# Patient Record
Sex: FEMALE | Race: WHITE | NOT HISPANIC OR LATINO | Employment: OTHER | URBAN - METROPOLITAN AREA
[De-identification: names, ages, dates, MRNs, and addresses within clinical notes are randomized per-mention and may not be internally consistent; named-entity substitution may affect disease eponyms.]

---

## 2017-07-06 ENCOUNTER — HOSPITAL ENCOUNTER (OUTPATIENT)
Dept: LAB | Facility: HOSPITAL | Age: 72
Discharge: HOME | End: 2017-07-06
Attending: TRANSPLANT SURGERY | Admitting: TRANSPLANT SURGERY

## 2017-07-06 LAB
ALBUMIN SERPL-MCNC: 3.6 G/DL (ref 3.5–4.8)
ALBUMIN/GLOB SERPL: 1.6 {RATIO} (ref 1–1.7)
ALP SERPL-CCNC: 62 IU/L (ref 32–91)
ALT SERPL-CCNC: 36 IU/L (ref 14–54)
ANION GAP SERPL CALC-SCNC: 13.9 MMOL/L (ref 10–20)
AST SERPL-CCNC: 29 IU/L (ref 15–41)
BILIRUB SERPL-MCNC: 1 MG/DL (ref 0.3–1.2)
BUN SERPL-MCNC: 19 MG/DL (ref 8–20)
BUN/CREAT SERPL: 15.8 (ref 5.4–26.2)
CALCIUM SERPL-MCNC: 9.8 MG/DL (ref 8.9–10.3)
CHLORIDE SERPL-SCNC: 102 MMOL/L (ref 101–111)
CHOLEST SERPL-MCNC: 221 MG/DL
CONV ABS BANDS: 0.1 10*3/UL
CONV CO2: 27 MMOL/L (ref 22–32)
CONV POLYCHROMASIA IN BLOOD BY LIGHT MICROSCOPY: SLIGHT
CONV TOTAL PROTEIN: 5.9 G/DL (ref 6.1–7.9)
CREAT UR-MCNC: 1.2 MG/DL (ref 0.4–1)
DIFFERENTIAL METHOD BLD: (no result)
ERYTHROCYTE [DISTWIDTH] IN BLOOD BY AUTOMATED COUNT: 14.5 % (ref 11.5–14.5)
GLOBULIN UR ELPH-MCNC: 2.3 G/DL (ref 2.5–3.8)
GLUCOSE SERPL-MCNC: 97 MG/DL (ref 65–99)
HCT VFR BLD AUTO: 38.9 % (ref 35–49)
HGB BLD-MCNC: 12.9 G/DL (ref 12–15)
MAGNESIUM SERPL-MCNC: 1.8 MG/DL (ref 1.8–2.5)
MCH RBC QN AUTO: 32.6 PG (ref 26–32)
MCHC RBC AUTO-ENTMCNC: 33.1 G/DL (ref 32–36)
MCV RBC AUTO: 98.4 FL (ref 80–94)
MONOCYTES # BLD AUTO: 0.1 10*3/UL (ref 0.1–1.3)
MONOCYTES NFR BLD AUTO: 2 % (ref 2–11)
NEUTROPHILS # BLD AUTO: 6.1 10*3/UL (ref 2.3–8.6)
NEUTROPHILS NFR BLD AUTO: 96 % (ref 50–75)
NEUTS BAND NFR BLD MANUAL: 2 % (ref 0–5)
PHOSPHATE SERPL-MCNC: 1.5 MG/DL (ref 2.4–4.7)
PLATELET # BLD AUTO: 130 10*3/UL (ref 150–450)
PMV BLD AUTO: 8.6 FL (ref 7.4–10.4)
POTASSIUM SERPL-SCNC: 3.9 MMOL/L (ref 3.6–5.1)
RBC # BLD AUTO: 3.95 10*6/UL (ref 4–5.4)
SODIUM SERPL-SCNC: 139 MMOL/L (ref 136–144)
TRIGL SERPL-MCNC: 453 MG/DL
WBC # BLD AUTO: 6.3 10*3/UL (ref 4.5–11.5)

## 2017-07-10 ENCOUNTER — HOSPITAL ENCOUNTER (OUTPATIENT)
Dept: LAB | Facility: HOSPITAL | Age: 72
Setting detail: SPECIMEN
Discharge: HOME | End: 2017-07-10
Attending: TRANSPLANT SURGERY | Admitting: TRANSPLANT SURGERY

## 2017-07-10 LAB
ALBUMIN SERPL-MCNC: 3.8 G/DL (ref 3.5–4.8)
ALBUMIN/GLOB SERPL: 1.7 {RATIO} (ref 1–1.7)
ALP SERPL-CCNC: 70 IU/L (ref 32–91)
ALT SERPL-CCNC: 26 IU/L (ref 14–54)
ANION GAP SERPL CALC-SCNC: 11.9 MMOL/L (ref 10–20)
AST SERPL-CCNC: 21 IU/L (ref 15–41)
BILIRUB SERPL-MCNC: 1.2 MG/DL (ref 0.3–1.2)
BUN SERPL-MCNC: 13 MG/DL (ref 8–20)
BUN/CREAT SERPL: 11.8 (ref 5.4–26.2)
CALCIUM SERPL-MCNC: 9.8 MG/DL (ref 8.9–10.3)
CHLORIDE SERPL-SCNC: 107 MMOL/L (ref 101–111)
CHOLEST SERPL-MCNC: 224 MG/DL
CONV ABS BANDS: 0.2 10*3/UL
CONV ABS IMM GRAN: 0.07 10*3/UL
CONV CO2: 26 MMOL/L (ref 22–32)
CONV POLYCHROMASIA IN BLOOD BY LIGHT MICROSCOPY: SLIGHT
CONV TOTAL PROTEIN: 6.1 G/DL (ref 6.1–7.9)
CONV TOXIC GRANULES IN BLOOD BY LIGHT MICROSCOPY: SLIGHT
CREAT UR-MCNC: 1.1 MG/DL (ref 0.4–1)
DIFFERENTIAL METHOD BLD: (no result)
ERYTHROCYTE [DISTWIDTH] IN BLOOD BY AUTOMATED COUNT: 15.1 % (ref 11.5–14.5)
GLOBULIN UR ELPH-MCNC: 2.3 G/DL (ref 2.5–3.8)
GLUCOSE SERPL-MCNC: 110 MG/DL (ref 65–99)
HCT VFR BLD AUTO: 40.4 % (ref 35–49)
HGB BLD-MCNC: 13.2 G/DL (ref 12–15)
LYMPHOCYTES # BLD AUTO: 0.1 10*3/UL (ref 0.8–4.8)
LYMPHOCYTES NFR BLD AUTO: 1 % (ref 18–42)
MAGNESIUM SERPL-MCNC: 1.7 MG/DL (ref 1.8–2.5)
MCH RBC QN AUTO: 32.3 PG (ref 26–32)
MCHC RBC AUTO-ENTMCNC: 32.7 G/DL (ref 32–36)
MCV RBC AUTO: 98.8 FL (ref 80–94)
METAMYELOCYTES NFR BLD: 1 %
MONOCYTES # BLD AUTO: 0.1 10*3/UL (ref 0.1–1.3)
MONOCYTES NFR BLD AUTO: 2 % (ref 2–11)
NEUTROPHILS # BLD AUTO: 6.9 10*3/UL (ref 2.3–8.6)
NEUTROPHILS NFR BLD AUTO: 93 % (ref 50–75)
NEUTS BAND NFR BLD MANUAL: 3 % (ref 0–5)
PHOSPHATE SERPL-MCNC: 1.3 MG/DL (ref 2.4–4.7)
PLATELET # BLD AUTO: (no result) 10*3/UL (ref 150–450)
PMV BLD AUTO: 8 FL (ref 7.4–10.4)
POTASSIUM SERPL-SCNC: 3.9 MMOL/L (ref 3.6–5.1)
RBC # BLD AUTO: 4.09 10*6/UL (ref 4–5.4)
SODIUM SERPL-SCNC: 141 MMOL/L (ref 136–144)
TRIGL SERPL-MCNC: 317 MG/DL
WBC # BLD AUTO: 7.4 10*3/UL (ref 4.5–11.5)

## 2017-07-13 ENCOUNTER — HOSPITAL ENCOUNTER (OUTPATIENT)
Dept: LAB | Facility: HOSPITAL | Age: 72
Setting detail: SPECIMEN
Discharge: HOME | End: 2017-07-13
Attending: TRANSPLANT SURGERY | Admitting: TRANSPLANT SURGERY

## 2017-07-13 LAB
ALBUMIN SERPL-MCNC: 3.6 G/DL (ref 3.5–4.8)
ALBUMIN/GLOB SERPL: 1.9 {RATIO} (ref 1–1.7)
ALP SERPL-CCNC: 79 IU/L (ref 32–91)
ALT SERPL-CCNC: 19 IU/L (ref 14–54)
ANION GAP SERPL CALC-SCNC: 12.7 MMOL/L (ref 10–20)
AST SERPL-CCNC: 17 IU/L (ref 15–41)
BASOPHILS # BLD AUTO: 0.1 10*3/UL (ref 0–0.2)
BASOPHILS NFR BLD AUTO: 1 % (ref 0–2)
BILIRUB SERPL-MCNC: 1 MG/DL (ref 0.3–1.2)
BUN SERPL-MCNC: 12 MG/DL (ref 8–20)
BUN/CREAT SERPL: 12 (ref 5.4–26.2)
CALCIUM SERPL-MCNC: 9.2 MG/DL (ref 8.9–10.3)
CHLORIDE SERPL-SCNC: 111 MMOL/L (ref 101–111)
CONV CO2: 23 MMOL/L (ref 22–32)
CONV TOTAL PROTEIN: 5.5 G/DL (ref 6.1–7.9)
CREAT UR-MCNC: 1 MG/DL (ref 0.4–1)
DIFFERENTIAL METHOD BLD: (no result)
EOSINOPHIL # BLD AUTO: 0 % (ref 0–3)
EOSINOPHIL # BLD AUTO: 0 10*3/UL (ref 0–0.3)
ERYTHROCYTE [DISTWIDTH] IN BLOOD BY AUTOMATED COUNT: 15 % (ref 11.5–14.5)
GLOBULIN UR ELPH-MCNC: 1.9 G/DL (ref 2.5–3.8)
GLUCOSE SERPL-MCNC: 104 MG/DL (ref 65–99)
HCT VFR BLD AUTO: 38.3 % (ref 35–49)
HGB BLD-MCNC: 12.7 G/DL (ref 12–15)
LYMPHOCYTES # BLD AUTO: 0 10*3/UL (ref 0.8–4.8)
LYMPHOCYTES NFR BLD AUTO: 1 % (ref 18–42)
MAGNESIUM SERPL-MCNC: 1.2 MG/DL (ref 1.8–2.5)
MCH RBC QN AUTO: 32.6 PG (ref 26–32)
MCHC RBC AUTO-ENTMCNC: 33.2 G/DL (ref 32–36)
MCV RBC AUTO: 98.3 FL (ref 80–94)
MONOCYTES # BLD AUTO: 0.2 10*3/UL (ref 0.1–1.3)
MONOCYTES NFR BLD AUTO: 4 % (ref 2–11)
NEUTROPHILS # BLD AUTO: 5.3 10*3/UL (ref 2.3–8.6)
NEUTROPHILS NFR BLD AUTO: 94 % (ref 50–75)
NRBC BLD AUTO-RTO: 0 /100{WBCS}
NRBC/RBC NFR BLD MANUAL: 0 10*3/UL
PHOSPHATE SERPL-MCNC: 2.5 MG/DL (ref 2.4–4.7)
PLATELET # BLD AUTO: 167 10*3/UL (ref 150–450)
PMV BLD AUTO: 7.5 FL (ref 7.4–10.4)
POTASSIUM SERPL-SCNC: 3.7 MMOL/L (ref 3.6–5.1)
RBC # BLD AUTO: 3.89 10*6/UL (ref 4–5.4)
SODIUM SERPL-SCNC: 143 MMOL/L (ref 136–144)
WBC # BLD AUTO: 5.5 10*3/UL (ref 4.5–11.5)

## 2017-07-17 ENCOUNTER — HOSPITAL ENCOUNTER (OUTPATIENT)
Dept: LAB | Facility: HOSPITAL | Age: 72
Setting detail: SPECIMEN
Discharge: HOME | End: 2017-07-17
Attending: TRANSPLANT SURGERY | Admitting: TRANSPLANT SURGERY

## 2017-07-17 LAB
ALBUMIN SERPL-MCNC: 3.5 G/DL (ref 3.5–4.8)
ALBUMIN/GLOB SERPL: 1.8 {RATIO} (ref 1–1.7)
ALP SERPL-CCNC: 84 IU/L (ref 32–91)
ALT SERPL-CCNC: 13 IU/L (ref 14–54)
ANION GAP SERPL CALC-SCNC: 12.2 MMOL/L (ref 10–20)
AST SERPL-CCNC: 13 IU/L (ref 15–41)
BASOPHILS # BLD AUTO: 0 10*3/UL (ref 0–0.2)
BASOPHILS NFR BLD AUTO: 1 % (ref 0–2)
BILIRUB SERPL-MCNC: 1 MG/DL (ref 0.3–1.2)
BUN SERPL-MCNC: 11 MG/DL (ref 8–20)
BUN/CREAT SERPL: 11 (ref 5.4–26.2)
CALCIUM SERPL-MCNC: 8.2 MG/DL (ref 8.9–10.3)
CHLORIDE SERPL-SCNC: 110 MMOL/L (ref 101–111)
CHOLEST SERPL-MCNC: 182 MG/DL
CONV CO2: 24 MMOL/L (ref 22–32)
CONV TOTAL PROTEIN: 5.4 G/DL (ref 6.1–7.9)
CREAT UR-MCNC: 1 MG/DL (ref 0.4–1)
DIFFERENTIAL METHOD BLD: (no result)
EOSINOPHIL # BLD AUTO: 0 % (ref 0–3)
EOSINOPHIL # BLD AUTO: 0 10*3/UL (ref 0–0.3)
ERYTHROCYTE [DISTWIDTH] IN BLOOD BY AUTOMATED COUNT: 14.9 % (ref 11.5–14.5)
GLOBULIN UR ELPH-MCNC: 1.9 G/DL (ref 2.5–3.8)
GLUCOSE SERPL-MCNC: 90 MG/DL (ref 65–99)
HCT VFR BLD AUTO: 36.5 % (ref 35–49)
HGB BLD-MCNC: 12.1 G/DL (ref 12–15)
LYMPHOCYTES # BLD AUTO: 0 10*3/UL (ref 0.8–4.8)
LYMPHOCYTES NFR BLD AUTO: 1 % (ref 18–42)
MAGNESIUM SERPL-MCNC: 1 MG/DL (ref 1.8–2.5)
MCH RBC QN AUTO: 32.5 PG (ref 26–32)
MCHC RBC AUTO-ENTMCNC: 33 G/DL (ref 32–36)
MCV RBC AUTO: 98.6 FL (ref 80–94)
MONOCYTES # BLD AUTO: 0.2 10*3/UL (ref 0.1–1.3)
MONOCYTES NFR BLD AUTO: 6 % (ref 2–11)
NEUTROPHILS # BLD AUTO: 3.2 10*3/UL (ref 2.3–8.6)
NEUTROPHILS NFR BLD AUTO: 92 % (ref 50–75)
NRBC BLD AUTO-RTO: 0 /100{WBCS}
NRBC/RBC NFR BLD MANUAL: 0 10*3/UL
PHOSPHATE SERPL-MCNC: 2.2 MG/DL (ref 2.4–4.7)
PLATELET # BLD AUTO: 125 10*3/UL (ref 150–450)
PMV BLD AUTO: 8.1 FL (ref 7.4–10.4)
POTASSIUM SERPL-SCNC: 3.2 MMOL/L (ref 3.6–5.1)
RBC # BLD AUTO: 3.7 10*6/UL (ref 4–5.4)
SODIUM SERPL-SCNC: 143 MMOL/L (ref 136–144)
TRIGL SERPL-MCNC: 224 MG/DL
WBC # BLD AUTO: 3.4 10*3/UL (ref 4.5–11.5)

## 2017-07-20 ENCOUNTER — HOSPITAL ENCOUNTER (OUTPATIENT)
Dept: LAB | Facility: HOSPITAL | Age: 72
Setting detail: SPECIMEN
Discharge: HOME | End: 2017-07-20
Attending: TRANSPLANT SURGERY | Admitting: TRANSPLANT SURGERY

## 2017-07-20 LAB
ALBUMIN SERPL-MCNC: 3.5 G/DL (ref 3.5–4.8)
ALBUMIN/GLOB SERPL: 1.9 {RATIO} (ref 1–1.7)
ALP SERPL-CCNC: 98 IU/L (ref 32–91)
ALT SERPL-CCNC: 12 IU/L (ref 14–54)
ANION GAP SERPL CALC-SCNC: 9.5 MMOL/L (ref 10–20)
AST SERPL-CCNC: 14 IU/L (ref 15–41)
BASOPHILS # BLD AUTO: 0 10*3/UL (ref 0–0.2)
BASOPHILS NFR BLD AUTO: 1 % (ref 0–2)
BILIRUB SERPL-MCNC: 0.9 MG/DL (ref 0.3–1.2)
BUN SERPL-MCNC: 10 MG/DL (ref 8–20)
BUN/CREAT SERPL: 11.1 (ref 5.4–26.2)
CALCIUM SERPL-MCNC: 8.1 MG/DL (ref 8.9–10.3)
CHLORIDE SERPL-SCNC: 112 MMOL/L (ref 101–111)
CONV CO2: 24 MMOL/L (ref 22–32)
CONV TOTAL PROTEIN: 5.3 G/DL (ref 6.1–7.9)
CREAT UR-MCNC: 0.9 MG/DL (ref 0.4–1)
DIFFERENTIAL METHOD BLD: (no result)
EOSINOPHIL # BLD AUTO: 0 % (ref 0–3)
EOSINOPHIL # BLD AUTO: 0 10*3/UL (ref 0–0.3)
ERYTHROCYTE [DISTWIDTH] IN BLOOD BY AUTOMATED COUNT: 14.4 % (ref 11.5–14.5)
GLOBULIN UR ELPH-MCNC: 1.8 G/DL (ref 2.5–3.8)
GLUCOSE SERPL-MCNC: 83 MG/DL (ref 65–99)
HCT VFR BLD AUTO: 35.6 % (ref 35–49)
HGB BLD-MCNC: 11.9 G/DL (ref 12–15)
LYMPHOCYTES # BLD AUTO: 0 10*3/UL (ref 0.8–4.8)
LYMPHOCYTES NFR BLD AUTO: 2 % (ref 18–42)
MAGNESIUM SERPL-MCNC: 1.2 MG/DL (ref 1.8–2.5)
MCH RBC QN AUTO: 32.8 PG (ref 26–32)
MCHC RBC AUTO-ENTMCNC: 33.6 G/DL (ref 32–36)
MCV RBC AUTO: 97.5 FL (ref 80–94)
MONOCYTES # BLD AUTO: 0.1 10*3/UL (ref 0.1–1.3)
MONOCYTES NFR BLD AUTO: 5 % (ref 2–11)
NEUTROPHILS # BLD AUTO: 2.1 10*3/UL (ref 2.3–8.6)
NEUTROPHILS NFR BLD AUTO: 92 % (ref 50–75)
NRBC BLD AUTO-RTO: 0 /100{WBCS}
NRBC/RBC NFR BLD MANUAL: 0 10*3/UL
PHOSPHATE SERPL-MCNC: 2.6 MG/DL (ref 2.4–4.7)
PLATELET # BLD AUTO: 117 10*3/UL (ref 150–450)
PMV BLD AUTO: 8 FL (ref 7.4–10.4)
POTASSIUM SERPL-SCNC: 3.5 MMOL/L (ref 3.6–5.1)
RBC # BLD AUTO: 3.65 10*6/UL (ref 4–5.4)
SODIUM SERPL-SCNC: 142 MMOL/L (ref 136–144)
WBC # BLD AUTO: 2.3 10*3/UL (ref 4.5–11.5)

## 2017-07-24 ENCOUNTER — HOSPITAL ENCOUNTER (OUTPATIENT)
Dept: LAB | Facility: HOSPITAL | Age: 72
Setting detail: SPECIMEN
Discharge: HOME | End: 2017-07-24
Attending: TRANSPLANT SURGERY | Admitting: TRANSPLANT SURGERY

## 2017-07-24 LAB
ALBUMIN SERPL-MCNC: 3.6 G/DL (ref 3.5–4.8)
ALBUMIN/GLOB SERPL: 1.9 {RATIO} (ref 1–1.7)
ALP SERPL-CCNC: 98 IU/L (ref 32–91)
ALT SERPL-CCNC: 11 IU/L (ref 14–54)
ANION GAP SERPL CALC-SCNC: 11.2 MMOL/L (ref 10–20)
AST SERPL-CCNC: 12 IU/L (ref 15–41)
BASOPHILS # BLD AUTO: 0 10*3/UL (ref 0–0.2)
BASOPHILS NFR BLD AUTO: 1 % (ref 0–2)
BILIRUB SERPL-MCNC: 1 MG/DL (ref 0.3–1.2)
BUN SERPL-MCNC: 9 MG/DL (ref 8–20)
BUN/CREAT SERPL: 10 (ref 5.4–26.2)
CALCIUM SERPL-MCNC: 8.4 MG/DL (ref 8.9–10.3)
CHLORIDE SERPL-SCNC: 110 MMOL/L (ref 101–111)
CHOLEST SERPL-MCNC: 180 MG/DL
CONV CO2: 25 MMOL/L (ref 22–32)
CONV TOTAL PROTEIN: 5.5 G/DL (ref 6.1–7.9)
CREAT UR-MCNC: 0.9 MG/DL (ref 0.4–1)
DIFFERENTIAL METHOD BLD: (no result)
EOSINOPHIL # BLD AUTO: 0 % (ref 0–3)
EOSINOPHIL # BLD AUTO: 0 10*3/UL (ref 0–0.3)
ERYTHROCYTE [DISTWIDTH] IN BLOOD BY AUTOMATED COUNT: 14.5 % (ref 11.5–14.5)
GLOBULIN UR ELPH-MCNC: 1.9 G/DL (ref 2.5–3.8)
GLUCOSE SERPL-MCNC: 78 MG/DL (ref 65–99)
HCT VFR BLD AUTO: 36.2 % (ref 35–49)
HGB BLD-MCNC: 11.8 G/DL (ref 12–15)
LYMPHOCYTES # BLD AUTO: 0 10*3/UL (ref 0.8–4.8)
LYMPHOCYTES NFR BLD AUTO: 2 % (ref 18–42)
MAGNESIUM SERPL-MCNC: 1.3 MG/DL (ref 1.8–2.5)
MCH RBC QN AUTO: 31.9 PG (ref 26–32)
MCHC RBC AUTO-ENTMCNC: 32.4 G/DL (ref 32–36)
MCV RBC AUTO: 98.5 FL (ref 80–94)
MONOCYTES # BLD AUTO: 0.1 10*3/UL (ref 0.1–1.3)
MONOCYTES NFR BLD AUTO: 3 % (ref 2–11)
NEUTROPHILS # BLD AUTO: 2.8 10*3/UL (ref 2.3–8.6)
NEUTROPHILS NFR BLD AUTO: 94 % (ref 50–75)
NRBC BLD AUTO-RTO: 0 /100{WBCS}
NRBC/RBC NFR BLD MANUAL: 0 10*3/UL
PHOSPHATE SERPL-MCNC: 2.3 MG/DL (ref 2.4–4.7)
PLATELET # BLD AUTO: 111 10*3/UL (ref 150–450)
PMV BLD AUTO: 8.3 FL (ref 7.4–10.4)
POTASSIUM SERPL-SCNC: 4.2 MMOL/L (ref 3.6–5.1)
RBC # BLD AUTO: 3.68 10*6/UL (ref 4–5.4)
SODIUM SERPL-SCNC: 142 MMOL/L (ref 136–144)
TRIGL SERPL-MCNC: 168 MG/DL
WBC # BLD AUTO: 3 10*3/UL (ref 4.5–11.5)

## 2017-07-27 ENCOUNTER — HOSPITAL ENCOUNTER (OUTPATIENT)
Dept: LAB | Facility: HOSPITAL | Age: 72
Setting detail: SPECIMEN
Discharge: HOME | End: 2017-07-27
Attending: TRANSPLANT SURGERY | Admitting: TRANSPLANT SURGERY

## 2017-07-27 LAB
ALBUMIN SERPL-MCNC: 3.6 G/DL (ref 3.5–4.8)
ALBUMIN/GLOB SERPL: 1.8 {RATIO} (ref 1–1.7)
ALP SERPL-CCNC: 99 IU/L (ref 32–91)
ALT SERPL-CCNC: 9 IU/L (ref 14–54)
ANION GAP SERPL CALC-SCNC: 14.1 MMOL/L (ref 10–20)
AST SERPL-CCNC: 12 IU/L (ref 15–41)
BASOPHILS # BLD AUTO: 0 10*3/UL (ref 0–0.2)
BASOPHILS NFR BLD AUTO: 1 % (ref 0–2)
BILIRUB SERPL-MCNC: 0.8 MG/DL (ref 0.3–1.2)
BUN SERPL-MCNC: 8 MG/DL (ref 8–20)
BUN/CREAT SERPL: 8.9 (ref 5.4–26.2)
CALCIUM SERPL-MCNC: 8.6 MG/DL (ref 8.9–10.3)
CHLORIDE SERPL-SCNC: 109 MMOL/L (ref 101–111)
CONV CO2: 23 MMOL/L (ref 22–32)
CONV TOTAL PROTEIN: 5.6 G/DL (ref 6.1–7.9)
CREAT UR-MCNC: 0.9 MG/DL (ref 0.4–1)
DIFFERENTIAL METHOD BLD: (no result)
EOSINOPHIL # BLD AUTO: 0 % (ref 0–3)
EOSINOPHIL # BLD AUTO: 0 10*3/UL (ref 0–0.3)
ERYTHROCYTE [DISTWIDTH] IN BLOOD BY AUTOMATED COUNT: 14.6 % (ref 11.5–14.5)
GLOBULIN UR ELPH-MCNC: 2 G/DL (ref 2.5–3.8)
GLUCOSE SERPL-MCNC: 86 MG/DL (ref 65–99)
HCT VFR BLD AUTO: 35.9 % (ref 35–49)
HGB BLD-MCNC: 11.6 G/DL (ref 12–15)
LYMPHOCYTES # BLD AUTO: 0 10*3/UL (ref 0.8–4.8)
LYMPHOCYTES NFR BLD AUTO: 1 % (ref 18–42)
MAGNESIUM SERPL-MCNC: 1.3 MG/DL (ref 1.8–2.5)
MCH RBC QN AUTO: 31.6 PG (ref 26–32)
MCHC RBC AUTO-ENTMCNC: 32.2 G/DL (ref 32–36)
MCV RBC AUTO: 98.3 FL (ref 80–94)
MONOCYTES # BLD AUTO: 0.1 10*3/UL (ref 0.1–1.3)
MONOCYTES NFR BLD AUTO: 5 % (ref 2–11)
NEUTROPHILS # BLD AUTO: 2 10*3/UL (ref 2.3–8.6)
NEUTROPHILS NFR BLD AUTO: 93 % (ref 50–75)
NRBC BLD AUTO-RTO: 0 /100{WBCS}
NRBC/RBC NFR BLD MANUAL: 0 10*3/UL
PHOSPHATE SERPL-MCNC: 3 MG/DL (ref 2.4–4.7)
PLATELET # BLD AUTO: 117 10*3/UL (ref 150–450)
PMV BLD AUTO: 8.3 FL (ref 7.4–10.4)
POTASSIUM SERPL-SCNC: 4.1 MMOL/L (ref 3.6–5.1)
RBC # BLD AUTO: 3.65 10*6/UL (ref 4–5.4)
SODIUM SERPL-SCNC: 142 MMOL/L (ref 136–144)
WBC # BLD AUTO: 2.2 10*3/UL (ref 4.5–11.5)

## 2017-07-31 ENCOUNTER — HOSPITAL ENCOUNTER (OUTPATIENT)
Dept: LAB | Facility: HOSPITAL | Age: 72
Discharge: HOME | End: 2017-07-31
Attending: INTERNAL MEDICINE | Admitting: INTERNAL MEDICINE

## 2017-07-31 LAB
ALBUMIN SERPL-MCNC: 3.8 G/DL (ref 3.5–4.8)
ALBUMIN/GLOB SERPL: 2.1 {RATIO} (ref 1–1.7)
ALP SERPL-CCNC: 92 IU/L (ref 32–91)
ALT SERPL-CCNC: 9 IU/L (ref 14–54)
ANION GAP SERPL CALC-SCNC: 12.9 MMOL/L (ref 10–20)
AST SERPL-CCNC: 11 IU/L (ref 15–41)
BASOPHILS # BLD AUTO: 0 10*3/UL (ref 0–0.2)
BASOPHILS NFR BLD AUTO: 1 % (ref 0–2)
BILIRUB SERPL-MCNC: 0.8 MG/DL (ref 0.3–1.2)
BUN SERPL-MCNC: 9 MG/DL (ref 8–20)
BUN/CREAT SERPL: 11.3 (ref 5.4–26.2)
CALCIUM SERPL-MCNC: 8.8 MG/DL (ref 8.9–10.3)
CHLORIDE SERPL-SCNC: 108 MMOL/L (ref 101–111)
CHOLEST SERPL-MCNC: 187 MG/DL
CONV CO2: 24 MMOL/L (ref 22–32)
CONV TOTAL PROTEIN: 5.6 G/DL (ref 6.1–7.9)
CREAT UR-MCNC: 0.8 MG/DL (ref 0.4–1)
DIFFERENTIAL METHOD BLD: (no result)
EOSINOPHIL # BLD AUTO: 0 % (ref 0–3)
EOSINOPHIL # BLD AUTO: 0 10*3/UL (ref 0–0.3)
ERYTHROCYTE [DISTWIDTH] IN BLOOD BY AUTOMATED COUNT: 14.5 % (ref 11.5–14.5)
GLOBULIN UR ELPH-MCNC: 1.8 G/DL (ref 2.5–3.8)
GLUCOSE SERPL-MCNC: 83 MG/DL (ref 65–99)
HCT VFR BLD AUTO: 36.8 % (ref 35–49)
HGB BLD-MCNC: 12 G/DL (ref 12–15)
LYMPHOCYTES # BLD AUTO: 0 10*3/UL (ref 0.8–4.8)
LYMPHOCYTES NFR BLD AUTO: 1 % (ref 18–42)
MAGNESIUM SERPL-MCNC: 1.2 MG/DL (ref 1.8–2.5)
MCH RBC QN AUTO: 31.8 PG (ref 26–32)
MCHC RBC AUTO-ENTMCNC: 32.7 G/DL (ref 32–36)
MCV RBC AUTO: 97.5 FL (ref 80–94)
MONOCYTES # BLD AUTO: 0.1 10*3/UL (ref 0.1–1.3)
MONOCYTES NFR BLD AUTO: 4 % (ref 2–11)
NEUTROPHILS # BLD AUTO: 3.2 10*3/UL (ref 2.3–8.6)
NEUTROPHILS NFR BLD AUTO: 94 % (ref 50–75)
NRBC BLD AUTO-RTO: 0 /100{WBCS}
NRBC/RBC NFR BLD MANUAL: 0 10*3/UL
PHOSPHATE SERPL-MCNC: 3.7 MG/DL (ref 2.4–4.7)
PLATELET # BLD AUTO: 122 10*3/UL (ref 150–450)
PMV BLD AUTO: 7.9 FL (ref 7.4–10.4)
POTASSIUM SERPL-SCNC: 3.9 MMOL/L (ref 3.6–5.1)
PTH-INTACT SERPL-MCNC: 422 PG/ML (ref 11–72)
RBC # BLD AUTO: 3.77 10*6/UL (ref 4–5.4)
SODIUM SERPL-SCNC: 141 MMOL/L (ref 136–144)
TRIGL SERPL-MCNC: 186 MG/DL
TSH SERPL-ACNC: 0.76 UIU/ML (ref 0.34–5.6)
WBC # BLD AUTO: 3.4 10*3/UL (ref 4.5–11.5)

## 2017-08-03 ENCOUNTER — HOSPITAL ENCOUNTER (OUTPATIENT)
Dept: LAB | Facility: HOSPITAL | Age: 72
Setting detail: SPECIMEN
Discharge: HOME | End: 2017-08-03
Attending: TRANSPLANT SURGERY | Admitting: TRANSPLANT SURGERY

## 2017-08-03 LAB
ALBUMIN SERPL-MCNC: 3.5 G/DL (ref 3.5–4.8)
ALBUMIN/GLOB SERPL: 1.7 {RATIO} (ref 1–1.7)
ALP SERPL-CCNC: 85 IU/L (ref 32–91)
ALT SERPL-CCNC: 9 IU/L (ref 14–54)
ANION GAP SERPL CALC-SCNC: 12.2 MMOL/L (ref 10–20)
AST SERPL-CCNC: 11 IU/L (ref 15–41)
BASOPHILS # BLD AUTO: 0 10*3/UL (ref 0–0.2)
BASOPHILS NFR BLD AUTO: 1 % (ref 0–2)
BILIRUB SERPL-MCNC: 0.7 MG/DL (ref 0.3–1.2)
BUN SERPL-MCNC: 8 MG/DL (ref 8–20)
BUN/CREAT SERPL: 10 (ref 5.4–26.2)
CALCIUM SERPL-MCNC: 9.7 MG/DL (ref 8.9–10.3)
CHLORIDE SERPL-SCNC: 112 MMOL/L (ref 101–111)
CONV CO2: 24 MMOL/L (ref 22–32)
CONV TOTAL PROTEIN: 5.6 G/DL (ref 6.1–7.9)
CREAT UR-MCNC: 0.8 MG/DL (ref 0.4–1)
DIFFERENTIAL METHOD BLD: (no result)
EOSINOPHIL # BLD AUTO: 0 % (ref 0–3)
EOSINOPHIL # BLD AUTO: 0 10*3/UL (ref 0–0.3)
ERYTHROCYTE [DISTWIDTH] IN BLOOD BY AUTOMATED COUNT: 14.4 % (ref 11.5–14.5)
GLOBULIN UR ELPH-MCNC: 2.1 G/DL (ref 2.5–3.8)
GLUCOSE SERPL-MCNC: 90 MG/DL (ref 65–99)
HCT VFR BLD AUTO: 35.5 % (ref 35–49)
HGB BLD-MCNC: 11.7 G/DL (ref 12–15)
LYMPHOCYTES # BLD AUTO: 0.1 10*3/UL (ref 0.8–4.8)
LYMPHOCYTES NFR BLD AUTO: 2 % (ref 18–42)
MAGNESIUM SERPL-MCNC: 1.5 MG/DL (ref 1.8–2.5)
MCH RBC QN AUTO: 32.3 PG (ref 26–32)
MCHC RBC AUTO-ENTMCNC: 32.9 G/DL (ref 32–36)
MCV RBC AUTO: 98.3 FL (ref 80–94)
MONOCYTES # BLD AUTO: 0.1 10*3/UL (ref 0.1–1.3)
MONOCYTES NFR BLD AUTO: 5 % (ref 2–11)
NEUTROPHILS # BLD AUTO: 2.2 10*3/UL (ref 2.3–8.6)
NEUTROPHILS NFR BLD AUTO: 92 % (ref 50–75)
NRBC BLD AUTO-RTO: 0 /100{WBCS}
NRBC/RBC NFR BLD MANUAL: 0 10*3/UL
PHOSPHATE SERPL-MCNC: 2.7 MG/DL (ref 2.4–4.7)
PLATELET # BLD AUTO: 126 10*3/UL (ref 150–450)
PMV BLD AUTO: 8.2 FL (ref 7.4–10.4)
POTASSIUM SERPL-SCNC: 4.2 MMOL/L (ref 3.6–5.1)
RBC # BLD AUTO: 3.61 10*6/UL (ref 4–5.4)
SODIUM SERPL-SCNC: 144 MMOL/L (ref 136–144)
WBC # BLD AUTO: 2.4 10*3/UL (ref 4.5–11.5)

## 2017-08-07 ENCOUNTER — HOSPITAL ENCOUNTER (OUTPATIENT)
Dept: LAB | Facility: HOSPITAL | Age: 72
Setting detail: SPECIMEN
Discharge: HOME | End: 2017-08-07
Attending: TRANSPLANT SURGERY | Admitting: TRANSPLANT SURGERY

## 2017-08-07 LAB
ALBUMIN SERPL-MCNC: 3.7 G/DL (ref 3.5–4.8)
ALBUMIN/GLOB SERPL: 1.9 {RATIO} (ref 1–1.7)
ALP SERPL-CCNC: 84 IU/L (ref 32–91)
ALT SERPL-CCNC: 9 IU/L (ref 14–54)
ANION GAP SERPL CALC-SCNC: 12.1 MMOL/L (ref 10–20)
AST SERPL-CCNC: 12 IU/L (ref 15–41)
BACTERIA ISLT: ABNORMAL
BASOPHILS # BLD AUTO: 0 10*3/UL (ref 0–0.2)
BASOPHILS NFR BLD AUTO: 1 % (ref 0–2)
BILIRUB SERPL-MCNC: 0.5 MG/DL (ref 0.3–1.2)
BILIRUB UR QL STRIP: NEGATIVE MG/DL
BUN SERPL-MCNC: 9 MG/DL (ref 8–20)
BUN/CREAT SERPL: 10 (ref 5.4–26.2)
CALCIUM SERPL-MCNC: 9.7 MG/DL (ref 8.9–10.3)
CASTS URNS QL MICRO: ABNORMAL /[LPF]
CHLORIDE SERPL-SCNC: 109 MMOL/L (ref 101–111)
CHOLEST SERPL-MCNC: 198 MG/DL
COLONY COUNT: ABNORMAL
COLOR UR: YELLOW
CONV BACTERIA IN URINE MICRO: ABNORMAL
CONV CLARITY OF URINE: ABNORMAL
CONV CO2: 24 MMOL/L (ref 22–32)
CONV HYALINE CASTS IN URINE MICRO: 2 /[LPF] (ref 0–5)
CONV PROTEIN IN URINE BY AUTOMATED TEST STRIP: NEGATIVE MG/DL
CONV SMALL ROUND CELLS: ABNORMAL /[HPF]
CONV TOTAL PROTEIN: 5.6 G/DL (ref 6.1–7.9)
CONV UROBILINOGEN IN URINE BY AUTOMATED TEST STRIP: 1 MG/DL
CREAT 24H UR-MCNC: 108.4 MG/DL
CREAT UR-MCNC: 0.9 MG/DL (ref 0.4–1)
CULTURE INDICATED?: ABNORMAL
DIFFERENTIAL METHOD BLD: (no result)
EOSINOPHIL # BLD AUTO: 0 % (ref 0–3)
EOSINOPHIL # BLD AUTO: 0 10*3/UL (ref 0–0.3)
ERYTHROCYTE [DISTWIDTH] IN BLOOD BY AUTOMATED COUNT: 14.5 % (ref 11.5–14.5)
GLOBULIN UR ELPH-MCNC: 1.9 G/DL (ref 2.5–3.8)
GLUCOSE SERPL-MCNC: 85 MG/DL (ref 65–99)
GLUCOSE UR QL: NEGATIVE MG/DL
HCT VFR BLD AUTO: 36.6 % (ref 35–49)
HGB BLD-MCNC: 11.8 G/DL (ref 12–15)
HGB UR QL STRIP: NEGATIVE
KETONES UR QL STRIP: NEGATIVE MG/DL
LEUKOCYTE ESTERASE UR QL STRIP: ABNORMAL
LEVOFLOXACIN SUSC ISLT: ABNORMAL
LYMPHOCYTES # BLD AUTO: 0.1 10*3/UL (ref 0.8–4.8)
LYMPHOCYTES NFR BLD AUTO: 3 % (ref 18–42)
Lab: ABNORMAL
MAGNESIUM SERPL-MCNC: 1.5 MG/DL (ref 1.8–2.5)
MCH RBC QN AUTO: 31.5 PG (ref 26–32)
MCHC RBC AUTO-ENTMCNC: 32.2 G/DL (ref 32–36)
MCV RBC AUTO: 97.9 FL (ref 80–94)
MEROPENEM SUSC ISLT: ABNORMAL
MICRO REPORT STATUS: ABNORMAL
MONOCYTES # BLD AUTO: 0.1 10*3/UL (ref 0.1–1.3)
MONOCYTES NFR BLD AUTO: 5 % (ref 2–11)
NEUTROPHILS # BLD AUTO: 2.6 10*3/UL (ref 2.3–8.6)
NEUTROPHILS NFR BLD AUTO: 91 % (ref 50–75)
NITRITE UR QL STRIP: POSITIVE
NITROFURANTOIN SUSC ISLT: ABNORMAL
NRBC BLD AUTO-RTO: 0 /100{WBCS}
NRBC/RBC NFR BLD MANUAL: 0 10*3/UL
PH UR STRIP.AUTO: 6.5 [PH] (ref 4.5–8)
PHOSPHATE SERPL-MCNC: 2.9 MG/DL (ref 2.4–4.7)
PLATELET # BLD AUTO: 164 10*3/UL (ref 150–450)
PMV BLD AUTO: 8.6 FL (ref 7.4–10.4)
POTASSIUM SERPL-SCNC: 4.1 MMOL/L (ref 3.6–5.1)
PROT UR-MCNC: 14 MG/DL
RBC # BLD AUTO: 3.74 10*6/UL (ref 4–5.4)
RBC #/AREA URNS HPF: 1 /[HPF] (ref 0–3)
SODIUM SERPL-SCNC: 141 MMOL/L (ref 136–144)
SP GR UR: 1.02 (ref 1–1.03)
SPECIMEN SOURCE: ABNORMAL
SPERM URNS QL MICRO: ABNORMAL /[HPF]
SQUAMOUS SPT QL MICRO: 1 /[HPF] (ref 0–5)
SUSC METH SPEC: ABNORMAL
TIGECYCLINE ISLT MIC: ABNORMAL
TOBRAMYCIN SUSC ISLT: ABNORMAL
TRIGL SERPL-MCNC: 148 MG/DL
UNIDENT CRYS URNS QL MICRO: ABNORMAL /[HPF]
WBC # BLD AUTO: 2.8 10*3/UL (ref 4.5–11.5)
WBC #/AREA URNS HPF: 83 /[HPF] (ref 0–5)
YEAST SPEC QL WET PREP: ABNORMAL /[HPF]

## 2017-08-10 ENCOUNTER — HOSPITAL ENCOUNTER (OUTPATIENT)
Dept: LAB | Facility: HOSPITAL | Age: 72
Setting detail: SPECIMEN
Discharge: HOME | End: 2017-08-10
Attending: TRANSPLANT SURGERY | Admitting: TRANSPLANT SURGERY

## 2017-08-10 LAB
ALBUMIN SERPL-MCNC: 3.6 G/DL (ref 3.5–4.8)
ALBUMIN/GLOB SERPL: 1.8 {RATIO} (ref 1–1.7)
ALP SERPL-CCNC: 87 IU/L (ref 32–91)
ALT SERPL-CCNC: 7 IU/L (ref 14–54)
ANION GAP SERPL CALC-SCNC: 10.8 MMOL/L (ref 10–20)
AST SERPL-CCNC: 12 IU/L (ref 15–41)
BASOPHILS # BLD AUTO: 0 10*3/UL (ref 0–0.2)
BASOPHILS NFR BLD AUTO: 1 % (ref 0–2)
BILIRUB SERPL-MCNC: 1 MG/DL (ref 0.3–1.2)
BUN SERPL-MCNC: 9 MG/DL (ref 8–20)
BUN/CREAT SERPL: 11.3 (ref 5.4–26.2)
CALCIUM SERPL-MCNC: 10 MG/DL (ref 8.9–10.3)
CHLORIDE SERPL-SCNC: 111 MMOL/L (ref 101–111)
CHOLEST SERPL-MCNC: 198 MG/DL
CONV CO2: 25 MMOL/L (ref 22–32)
CONV TOTAL PROTEIN: 5.6 G/DL (ref 6.1–7.9)
CREAT UR-MCNC: 0.8 MG/DL (ref 0.4–1)
DIFFERENTIAL METHOD BLD: (no result)
EOSINOPHIL # BLD AUTO: 0 % (ref 0–3)
EOSINOPHIL # BLD AUTO: 0 10*3/UL (ref 0–0.3)
ERYTHROCYTE [DISTWIDTH] IN BLOOD BY AUTOMATED COUNT: 14.8 % (ref 11.5–14.5)
GLOBULIN UR ELPH-MCNC: 2 G/DL (ref 2.5–3.8)
GLUCOSE SERPL-MCNC: 91 MG/DL (ref 65–99)
HCT VFR BLD AUTO: 37.1 % (ref 35–49)
HGB BLD-MCNC: 12.2 G/DL (ref 12–15)
LYMPHOCYTES # BLD AUTO: 0 10*3/UL (ref 0.8–4.8)
LYMPHOCYTES NFR BLD AUTO: 1 % (ref 18–42)
MAGNESIUM SERPL-MCNC: 1.4 MG/DL (ref 1.8–2.5)
MCH RBC QN AUTO: 32.3 PG (ref 26–32)
MCHC RBC AUTO-ENTMCNC: 32.9 G/DL (ref 32–36)
MCV RBC AUTO: 98.2 FL (ref 80–94)
MONOCYTES # BLD AUTO: 0.2 10*3/UL (ref 0.1–1.3)
MONOCYTES NFR BLD AUTO: 5 % (ref 2–11)
NEUTROPHILS # BLD AUTO: 3.3 10*3/UL (ref 2.3–8.6)
NEUTROPHILS NFR BLD AUTO: 93 % (ref 50–75)
NRBC BLD AUTO-RTO: 0 /100{WBCS}
NRBC/RBC NFR BLD MANUAL: 0 10*3/UL
PHOSPHATE SERPL-MCNC: 2.9 MG/DL (ref 2.4–4.7)
PLATELET # BLD AUTO: 153 10*3/UL (ref 150–450)
PMV BLD AUTO: 8.1 FL (ref 7.4–10.4)
POTASSIUM SERPL-SCNC: 3.8 MMOL/L (ref 3.6–5.1)
RBC # BLD AUTO: 3.78 10*6/UL (ref 4–5.4)
SODIUM SERPL-SCNC: 143 MMOL/L (ref 136–144)
TRIGL SERPL-MCNC: 167 MG/DL
WBC # BLD AUTO: 3.6 10*3/UL (ref 4.5–11.5)

## 2017-08-14 ENCOUNTER — HOSPITAL ENCOUNTER (OUTPATIENT)
Dept: LAB | Facility: HOSPITAL | Age: 72
Setting detail: SPECIMEN
Discharge: HOME | End: 2017-08-14
Attending: TRANSPLANT SURGERY | Admitting: TRANSPLANT SURGERY

## 2017-08-14 LAB
ALBUMIN SERPL-MCNC: 3.8 G/DL (ref 3.5–4.8)
ALBUMIN/GLOB SERPL: 1.9 {RATIO} (ref 1–1.7)
ALP SERPL-CCNC: 82 IU/L (ref 32–91)
ALT SERPL-CCNC: 9 IU/L (ref 14–54)
ANION GAP SERPL CALC-SCNC: 16.3 MMOL/L (ref 10–20)
AST SERPL-CCNC: 12 IU/L (ref 15–41)
BASOPHILS # BLD AUTO: 0 10*3/UL (ref 0–0.2)
BASOPHILS NFR BLD AUTO: 1 % (ref 0–2)
BILIRUB SERPL-MCNC: 0.8 MG/DL (ref 0.3–1.2)
BUN SERPL-MCNC: 9 MG/DL (ref 8–20)
BUN/CREAT SERPL: 10 (ref 5.4–26.2)
CALCIUM SERPL-MCNC: 9.8 MG/DL (ref 8.9–10.3)
CHLORIDE SERPL-SCNC: 111 MMOL/L (ref 101–111)
CHOLEST SERPL-MCNC: 182 MG/DL
CONV CO2: 22 MMOL/L (ref 22–32)
CONV TOTAL PROTEIN: 5.8 G/DL (ref 6.1–7.9)
CREAT UR-MCNC: 0.9 MG/DL (ref 0.4–1)
DIFFERENTIAL METHOD BLD: (no result)
EOSINOPHIL # BLD AUTO: 0 % (ref 0–3)
EOSINOPHIL # BLD AUTO: 0 10*3/UL (ref 0–0.3)
ERYTHROCYTE [DISTWIDTH] IN BLOOD BY AUTOMATED COUNT: 14.7 % (ref 11.5–14.5)
GLOBULIN UR ELPH-MCNC: 2 G/DL (ref 2.5–3.8)
GLUCOSE SERPL-MCNC: 79 MG/DL (ref 65–99)
HCT VFR BLD AUTO: 36.9 % (ref 35–49)
HGB BLD-MCNC: 11.9 G/DL (ref 12–15)
LYMPHOCYTES # BLD AUTO: 0.1 10*3/UL (ref 0.8–4.8)
LYMPHOCYTES NFR BLD AUTO: 2 % (ref 18–42)
MAGNESIUM SERPL-MCNC: 1.4 MG/DL (ref 1.8–2.5)
MCH RBC QN AUTO: 31.6 PG (ref 26–32)
MCHC RBC AUTO-ENTMCNC: 32.4 G/DL (ref 32–36)
MCV RBC AUTO: 97.5 FL (ref 80–94)
MONOCYTES # BLD AUTO: 0.6 10*3/UL (ref 0.1–1.3)
MONOCYTES NFR BLD AUTO: 16 % (ref 2–11)
NEUTROPHILS # BLD AUTO: 3.1 10*3/UL (ref 2.3–8.6)
NEUTROPHILS NFR BLD AUTO: 81 % (ref 50–75)
NRBC BLD AUTO-RTO: 0 /100{WBCS}
NRBC/RBC NFR BLD MANUAL: 0 10*3/UL
PHOSPHATE SERPL-MCNC: 3.2 MG/DL (ref 2.4–4.7)
PLATELET # BLD AUTO: 149 10*3/UL (ref 150–450)
PMV BLD AUTO: 8.4 FL (ref 7.4–10.4)
POTASSIUM SERPL-SCNC: 4.3 MMOL/L (ref 3.6–5.1)
RBC # BLD AUTO: 3.78 10*6/UL (ref 4–5.4)
SODIUM SERPL-SCNC: 145 MMOL/L (ref 136–144)
TRIGL SERPL-MCNC: 134 MG/DL
WBC # BLD AUTO: 3.9 10*3/UL (ref 4.5–11.5)

## 2017-08-17 ENCOUNTER — HOSPITAL ENCOUNTER (OUTPATIENT)
Dept: LAB | Facility: HOSPITAL | Age: 72
Setting detail: SPECIMEN
Discharge: HOME | End: 2017-08-17
Attending: TRANSPLANT SURGERY | Admitting: TRANSPLANT SURGERY

## 2017-08-17 LAB
ALBUMIN SERPL-MCNC: 3.7 G/DL (ref 3.5–4.8)
ALBUMIN/GLOB SERPL: 1.7 {RATIO} (ref 1–1.7)
ALP SERPL-CCNC: 83 IU/L (ref 32–91)
ALT SERPL-CCNC: 10 IU/L (ref 14–54)
ANION GAP SERPL CALC-SCNC: 10.9 MMOL/L (ref 10–20)
AST SERPL-CCNC: 12 IU/L (ref 15–41)
BASOPHILS # BLD AUTO: 0.1 10*3/UL (ref 0–0.2)
BASOPHILS NFR BLD AUTO: 1 % (ref 0–2)
BILIRUB SERPL-MCNC: 0.7 MG/DL (ref 0.3–1.2)
BUN SERPL-MCNC: 15 MG/DL (ref 8–20)
BUN/CREAT SERPL: 18.8 (ref 5.4–26.2)
CALCIUM SERPL-MCNC: 9.9 MG/DL (ref 8.9–10.3)
CHLORIDE SERPL-SCNC: 112 MMOL/L (ref 101–111)
CHOLEST SERPL-MCNC: 188 MG/DL
CONV CO2: 24 MMOL/L (ref 22–32)
CONV TOTAL PROTEIN: 5.9 G/DL (ref 6.1–7.9)
CREAT UR-MCNC: 0.8 MG/DL (ref 0.4–1)
DIFFERENTIAL METHOD BLD: (no result)
EOSINOPHIL # BLD AUTO: 0 % (ref 0–3)
EOSINOPHIL # BLD AUTO: 0 10*3/UL (ref 0–0.3)
ERYTHROCYTE [DISTWIDTH] IN BLOOD BY AUTOMATED COUNT: 14.7 % (ref 11.5–14.5)
GLOBULIN UR ELPH-MCNC: 2.2 G/DL (ref 2.5–3.8)
GLUCOSE SERPL-MCNC: 89 MG/DL (ref 65–99)
HCT VFR BLD AUTO: 37.7 % (ref 35–49)
HGB BLD-MCNC: 12.3 G/DL (ref 12–15)
LYMPHOCYTES # BLD AUTO: 0.1 10*3/UL (ref 0.8–4.8)
LYMPHOCYTES NFR BLD AUTO: 2 % (ref 18–42)
MAGNESIUM SERPL-MCNC: 1.5 MG/DL (ref 1.8–2.5)
MCH RBC QN AUTO: 31.9 PG (ref 26–32)
MCHC RBC AUTO-ENTMCNC: 32.5 G/DL (ref 32–36)
MCV RBC AUTO: 98.1 FL (ref 80–94)
MONOCYTES # BLD AUTO: 0.8 10*3/UL (ref 0.1–1.3)
MONOCYTES NFR BLD AUTO: 19 % (ref 2–11)
NEUTROPHILS # BLD AUTO: 3.2 10*3/UL (ref 2.3–8.6)
NEUTROPHILS NFR BLD AUTO: 78 % (ref 50–75)
NRBC BLD AUTO-RTO: 0 /100{WBCS}
NRBC/RBC NFR BLD MANUAL: 0 10*3/UL
PHOSPHATE SERPL-MCNC: 2.8 MG/DL (ref 2.4–4.7)
PLATELET # BLD AUTO: 156 10*3/UL (ref 150–450)
PMV BLD AUTO: 8.5 FL (ref 7.4–10.4)
POTASSIUM SERPL-SCNC: 3.9 MMOL/L (ref 3.6–5.1)
RBC # BLD AUTO: 3.84 10*6/UL (ref 4–5.4)
SODIUM SERPL-SCNC: 143 MMOL/L (ref 136–144)
TRIGL SERPL-MCNC: 150 MG/DL
WBC # BLD AUTO: 4.1 10*3/UL (ref 4.5–11.5)

## 2017-08-21 ENCOUNTER — HOSPITAL ENCOUNTER (OUTPATIENT)
Dept: LAB | Facility: HOSPITAL | Age: 72
Setting detail: SPECIMEN
Discharge: HOME | End: 2017-08-21
Attending: TRANSPLANT SURGERY | Admitting: TRANSPLANT SURGERY

## 2017-08-21 LAB
ALBUMIN SERPL-MCNC: 3.8 G/DL (ref 3.5–4.8)
ALBUMIN/GLOB SERPL: 2.1 {RATIO} (ref 1–1.7)
ALP SERPL-CCNC: 83 IU/L (ref 32–91)
ALT SERPL-CCNC: 10 IU/L (ref 14–54)
ANION GAP SERPL CALC-SCNC: 12.2 MMOL/L (ref 10–20)
AST SERPL-CCNC: 14 IU/L (ref 15–41)
BASOPHILS # BLD AUTO: 0.1 10*3/UL (ref 0–0.2)
BASOPHILS NFR BLD AUTO: 1 % (ref 0–2)
BILIRUB SERPL-MCNC: 0.9 MG/DL (ref 0.3–1.2)
BUN SERPL-MCNC: 12 MG/DL (ref 8–20)
BUN/CREAT SERPL: 13.3 (ref 5.4–26.2)
CALCIUM SERPL-MCNC: 9.7 MG/DL (ref 8.9–10.3)
CHLORIDE SERPL-SCNC: 111 MMOL/L (ref 101–111)
CHOLEST SERPL-MCNC: 193 MG/DL
CONV ABS BANDS: 0.8 10*3/UL
CONV ABS IMM GRAN: 0.45 10*3/UL
CONV CO2: 24 MMOL/L (ref 22–32)
CONV OVALOCYTES IN BLOOD BY LIGHT MICROSCOPY: (no result)
CONV POLYCHROMASIA IN BLOOD BY LIGHT MICROSCOPY: SLIGHT
CONV TOTAL PROTEIN: 5.6 G/DL (ref 6.1–7.9)
CONV TOXIC GRANULES IN BLOOD BY LIGHT MICROSCOPY: SLIGHT
CREAT UR-MCNC: 0.9 MG/DL (ref 0.4–1)
DIFFERENTIAL METHOD BLD: (no result)
ERYTHROCYTE [DISTWIDTH] IN BLOOD BY AUTOMATED COUNT: 14.5 % (ref 11.5–14.5)
GLOBULIN UR ELPH-MCNC: 1.8 G/DL (ref 2.5–3.8)
GLUCOSE SERPL-MCNC: 82 MG/DL (ref 65–99)
HCT VFR BLD AUTO: 37.8 % (ref 35–49)
HGB BLD-MCNC: 12.4 G/DL (ref 12–15)
LYMPHOCYTES # BLD AUTO: 0.2 10*3/UL (ref 0.8–4.8)
LYMPHOCYTES NFR BLD AUTO: 3 % (ref 18–42)
MAGNESIUM SERPL-MCNC: 1.6 MG/DL (ref 1.8–2.5)
MCH RBC QN AUTO: 32.2 PG (ref 26–32)
MCHC RBC AUTO-ENTMCNC: 32.7 G/DL (ref 32–36)
MCV RBC AUTO: 98.3 FL (ref 80–94)
METAMYELOCYTES NFR BLD: 8 %
MONOCYTES # BLD AUTO: 0.8 10*3/UL (ref 0.1–1.3)
MONOCYTES NFR BLD AUTO: 16 % (ref 2–11)
MYELOCYTES NFR BLD MANUAL: 1 %
NEUTROPHILS # BLD AUTO: 2.7 10*3/UL (ref 2.3–8.6)
NEUTROPHILS NFR BLD AUTO: 56 % (ref 50–75)
NEUTS BAND NFR BLD MANUAL: 15 % (ref 0–5)
PATHOLOGIST REVIEW: (no result)
PHOSPHATE SERPL-MCNC: 3.4 MG/DL (ref 2.4–4.7)
PLATELET # BLD AUTO: 179 10*3/UL (ref 150–450)
PMV BLD AUTO: 8.1 FL (ref 7.4–10.4)
POTASSIUM SERPL-SCNC: 4.2 MMOL/L (ref 3.6–5.1)
RBC # BLD AUTO: 3.84 10*6/UL (ref 4–5.4)
SODIUM SERPL-SCNC: 143 MMOL/L (ref 136–144)
TRIGL SERPL-MCNC: 174 MG/DL
WBC # BLD AUTO: 5 10*3/UL (ref 4.5–11.5)

## 2017-08-24 ENCOUNTER — HOSPITAL ENCOUNTER (OUTPATIENT)
Dept: LAB | Facility: HOSPITAL | Age: 72
Setting detail: SPECIMEN
Discharge: HOME | End: 2017-08-24
Attending: TRANSPLANT SURGERY | Admitting: TRANSPLANT SURGERY

## 2017-08-24 LAB
ALBUMIN SERPL-MCNC: 3.9 G/DL (ref 3.5–4.8)
ALBUMIN/GLOB SERPL: 1.9 {RATIO} (ref 1–1.7)
ALP SERPL-CCNC: 87 IU/L (ref 32–91)
ALT SERPL-CCNC: 12 IU/L (ref 14–54)
ANION GAP SERPL CALC-SCNC: 11.1 MMOL/L (ref 10–20)
AST SERPL-CCNC: 16 IU/L (ref 15–41)
BASOPHILS # BLD AUTO: 0.2 10*3/UL (ref 0–0.2)
BASOPHILS NFR BLD AUTO: 3 % (ref 0–2)
BILIRUB SERPL-MCNC: 0.8 MG/DL (ref 0.3–1.2)
BUN SERPL-MCNC: 11 MG/DL (ref 8–20)
BUN/CREAT SERPL: 12.2 (ref 5.4–26.2)
CALCIUM SERPL-MCNC: 10 MG/DL (ref 8.9–10.3)
CHLORIDE SERPL-SCNC: 111 MMOL/L (ref 101–111)
CHOLEST SERPL-MCNC: 194 MG/DL
CONV ABS BANDS: 1.4 10*3/UL
CONV ABS IMM GRAN: 0.35 10*3/UL
CONV CO2: 25 MMOL/L (ref 22–32)
CONV TOTAL PROTEIN: 6 G/DL (ref 6.1–7.9)
CREAT UR-MCNC: 0.9 MG/DL (ref 0.4–1)
DIFFERENTIAL METHOD BLD: (no result)
ERYTHROCYTE [DISTWIDTH] IN BLOOD BY AUTOMATED COUNT: 15 % (ref 11.5–14.5)
GLOBULIN UR ELPH-MCNC: 2.1 G/DL (ref 2.5–3.8)
GLUCOSE SERPL-MCNC: 106 MG/DL (ref 65–99)
HCT VFR BLD AUTO: 39.5 % (ref 35–49)
HGB BLD-MCNC: 12.7 G/DL (ref 12–15)
LYMPHOCYTES # BLD AUTO: 0.1 10*3/UL (ref 0.8–4.8)
LYMPHOCYTES NFR BLD AUTO: 1 % (ref 18–42)
MAGNESIUM SERPL-MCNC: 1.6 MG/DL (ref 1.8–2.5)
MCH RBC QN AUTO: 31.8 PG (ref 26–32)
MCHC RBC AUTO-ENTMCNC: 32.3 G/DL (ref 32–36)
MCV RBC AUTO: 98.4 FL (ref 80–94)
METAMYELOCYTES NFR BLD: 3 %
MONOCYTES # BLD AUTO: 0.8 10*3/UL (ref 0.1–1.3)
MONOCYTES NFR BLD AUTO: 12 % (ref 2–11)
MYELOCYTES NFR BLD MANUAL: 2 %
NEUTROPHILS # BLD AUTO: 4.2 10*3/UL (ref 2.3–8.6)
NEUTROPHILS NFR BLD AUTO: 59 % (ref 50–75)
NEUTS BAND NFR BLD MANUAL: 20 % (ref 0–5)
PATHOLOGIST REVIEW: (no result)
PHOSPHATE SERPL-MCNC: 3.3 MG/DL (ref 2.4–4.7)
PLATELET # BLD AUTO: 175 10*3/UL (ref 150–450)
PMV BLD AUTO: 8.1 FL (ref 7.4–10.4)
POTASSIUM SERPL-SCNC: 4.1 MMOL/L (ref 3.6–5.1)
RBC # BLD AUTO: 4.01 10*6/UL (ref 4–5.4)
SODIUM SERPL-SCNC: 143 MMOL/L (ref 136–144)
TRIGL SERPL-MCNC: 164 MG/DL
WBC # BLD AUTO: 7 10*3/UL (ref 4.5–11.5)

## 2017-08-28 ENCOUNTER — HOSPITAL ENCOUNTER (OUTPATIENT)
Dept: LAB | Facility: HOSPITAL | Age: 72
Setting detail: SPECIMEN
Discharge: HOME | End: 2017-08-28
Attending: TRANSPLANT SURGERY | Admitting: TRANSPLANT SURGERY

## 2017-08-28 LAB
ALBUMIN SERPL-MCNC: 3.9 G/DL (ref 3.5–4.8)
ALBUMIN/GLOB SERPL: 2.1 {RATIO} (ref 1–1.7)
ALP SERPL-CCNC: 80 IU/L (ref 32–91)
ALT SERPL-CCNC: 11 IU/L (ref 14–54)
ANION GAP SERPL CALC-SCNC: 12.1 MMOL/L (ref 10–20)
AST SERPL-CCNC: 16 IU/L (ref 15–41)
BASOPHILS # BLD AUTO: 0.1 10*3/UL (ref 0–0.2)
BASOPHILS NFR BLD AUTO: 1 % (ref 0–2)
BILIRUB SERPL-MCNC: 1 MG/DL (ref 0.3–1.2)
BUN SERPL-MCNC: 10 MG/DL (ref 8–20)
BUN/CREAT SERPL: 11.1 (ref 5.4–26.2)
CALCIUM SERPL-MCNC: 9.9 MG/DL (ref 8.9–10.3)
CHLORIDE SERPL-SCNC: 110 MMOL/L (ref 101–111)
CHOLEST SERPL-MCNC: 188 MG/DL
CONV ABS BANDS: 1.5 10*3/UL
CONV ABS IMM GRAN: 0.46 10*3/UL
CONV CO2: 24 MMOL/L (ref 22–32)
CONV TOTAL PROTEIN: 5.8 G/DL (ref 6.1–7.9)
CREAT UR-MCNC: 0.9 MG/DL (ref 0.4–1)
DIFFERENTIAL METHOD BLD: (no result)
ERYTHROCYTE [DISTWIDTH] IN BLOOD BY AUTOMATED COUNT: 14.8 % (ref 11.5–14.5)
GLOBULIN UR ELPH-MCNC: 1.9 G/DL (ref 2.5–3.8)
GLUCOSE SERPL-MCNC: 113 MG/DL (ref 65–99)
HCT VFR BLD AUTO: 38.5 % (ref 35–49)
HGB BLD-MCNC: 12.7 G/DL (ref 12–15)
LYMPHOCYTES # BLD AUTO: 0.3 10*3/UL (ref 0.8–4.8)
LYMPHOCYTES NFR BLD AUTO: 5 % (ref 18–42)
MAGNESIUM SERPL-MCNC: 1.6 MG/DL (ref 1.8–2.5)
MCH RBC QN AUTO: 32.5 PG (ref 26–32)
MCHC RBC AUTO-ENTMCNC: 32.9 G/DL (ref 32–36)
MCV RBC AUTO: 98.7 FL (ref 80–94)
METAMYELOCYTES NFR BLD: 5 %
MONOCYTES # BLD AUTO: 0.5 10*3/UL (ref 0.1–1.3)
MONOCYTES NFR BLD AUTO: 8 % (ref 2–11)
MYELOCYTES NFR BLD MANUAL: 2 %
NEUTROPHILS # BLD AUTO: 3.7 10*3/UL (ref 2.3–8.6)
NEUTROPHILS NFR BLD AUTO: 57 % (ref 50–75)
NEUTS BAND NFR BLD MANUAL: 22 % (ref 0–5)
PATHOLOGIST REVIEW: (no result)
PHOSPHATE SERPL-MCNC: 3.2 MG/DL (ref 2.4–4.7)
PLATELET # BLD AUTO: 142 10*3/UL (ref 150–450)
PMV BLD AUTO: 8.1 FL (ref 7.4–10.4)
POTASSIUM SERPL-SCNC: 4.1 MMOL/L (ref 3.6–5.1)
RBC # BLD AUTO: 3.91 10*6/UL (ref 4–5.4)
SODIUM SERPL-SCNC: 142 MMOL/L (ref 136–144)
TRIGL SERPL-MCNC: 141 MG/DL
WBC # BLD AUTO: 6.6 10*3/UL (ref 4.5–11.5)

## 2017-08-31 ENCOUNTER — HOSPITAL ENCOUNTER (OUTPATIENT)
Dept: LAB | Facility: HOSPITAL | Age: 72
Setting detail: SPECIMEN
Discharge: HOME | End: 2017-08-31
Attending: TRANSPLANT SURGERY | Admitting: TRANSPLANT SURGERY

## 2017-08-31 LAB
ALBUMIN SERPL-MCNC: 3.9 G/DL (ref 3.5–4.8)
ALBUMIN/GLOB SERPL: 2.3 {RATIO} (ref 1–1.7)
ALP SERPL-CCNC: 85 IU/L (ref 32–91)
ALT SERPL-CCNC: 12 IU/L (ref 14–54)
ANION GAP SERPL CALC-SCNC: 11.6 MMOL/L (ref 10–20)
AST SERPL-CCNC: 18 IU/L (ref 15–41)
BILIRUB SERPL-MCNC: 0.9 MG/DL (ref 0.3–1.2)
BUN SERPL-MCNC: 10 MG/DL (ref 8–20)
BUN/CREAT SERPL: 11.1 (ref 5.4–26.2)
CALCIUM SERPL-MCNC: 10.2 MG/DL (ref 8.9–10.3)
CHLORIDE SERPL-SCNC: 110 MMOL/L (ref 101–111)
CONV ABS BANDS: 1.6 10*3/UL
CONV ABS IMM GRAN: 0.26 10*3/UL
CONV CO2: 25 MMOL/L (ref 22–32)
CONV TOTAL PROTEIN: 5.6 G/DL (ref 6.1–7.9)
CREAT UR-MCNC: 0.9 MG/DL (ref 0.4–1)
DIFFERENTIAL METHOD BLD: (no result)
ERYTHROCYTE [DISTWIDTH] IN BLOOD BY AUTOMATED COUNT: 14.6 % (ref 11.5–14.5)
GLOBULIN UR ELPH-MCNC: 1.7 G/DL (ref 2.5–3.8)
GLUCOSE SERPL-MCNC: 82 MG/DL (ref 65–99)
HCT VFR BLD AUTO: 39.3 % (ref 35–49)
HGB BLD-MCNC: 12.9 G/DL (ref 12–15)
LYMPHOCYTES # BLD AUTO: 0.3 10*3/UL (ref 0.8–4.8)
LYMPHOCYTES NFR BLD AUTO: 4 % (ref 18–42)
MAGNESIUM SERPL-MCNC: 1.7 MG/DL (ref 1.8–2.5)
MCH RBC QN AUTO: 32.2 PG (ref 26–32)
MCHC RBC AUTO-ENTMCNC: 32.8 G/DL (ref 32–36)
MCV RBC AUTO: 98.3 FL (ref 80–94)
METAMYELOCYTES NFR BLD: 4 %
MONOCYTES # BLD AUTO: 0.8 10*3/UL (ref 0.1–1.3)
MONOCYTES NFR BLD AUTO: 12 % (ref 2–11)
NEUTROPHILS # BLD AUTO: 3.6 10*3/UL (ref 2.3–8.6)
NEUTROPHILS NFR BLD AUTO: 56 % (ref 50–75)
NEUTS BAND NFR BLD MANUAL: 24 % (ref 0–5)
PATHOLOGIST REVIEW: (no result)
PHOSPHATE SERPL-MCNC: 3 MG/DL (ref 2.4–4.7)
PLATELET # BLD AUTO: 136 10*3/UL (ref 150–450)
PMV BLD AUTO: 8.2 FL (ref 7.4–10.4)
POTASSIUM SERPL-SCNC: 4.6 MMOL/L (ref 3.6–5.1)
RBC # BLD AUTO: 3.99 10*6/UL (ref 4–5.4)
SODIUM SERPL-SCNC: 142 MMOL/L (ref 136–144)
WBC # BLD AUTO: 6.6 10*3/UL (ref 4.5–11.5)

## 2017-09-05 ENCOUNTER — HOSPITAL ENCOUNTER (OUTPATIENT)
Dept: LAB | Facility: HOSPITAL | Age: 72
Setting detail: SPECIMEN
Discharge: HOME | End: 2017-09-05
Attending: TRANSPLANT SURGERY | Admitting: TRANSPLANT SURGERY

## 2017-09-05 LAB
ALBUMIN SERPL-MCNC: 4.1 G/DL (ref 3.5–4.8)
ALBUMIN/GLOB SERPL: 2.2 {RATIO} (ref 1–1.7)
ALP SERPL-CCNC: 91 IU/L (ref 32–91)
ALT SERPL-CCNC: 11 IU/L (ref 14–54)
ANION GAP SERPL CALC-SCNC: 10.3 MMOL/L (ref 10–20)
AST SERPL-CCNC: 15 IU/L (ref 15–41)
BILIRUB SERPL-MCNC: 0.8 MG/DL (ref 0.3–1.2)
BUN SERPL-MCNC: 15 MG/DL (ref 8–20)
BUN/CREAT SERPL: 18.8 (ref 5.4–26.2)
CALCIUM SERPL-MCNC: 10.4 MG/DL (ref 8.9–10.3)
CHLORIDE SERPL-SCNC: 111 MMOL/L (ref 101–111)
CHOLEST SERPL-MCNC: 190 MG/DL
CONV ABS BANDS: 1.9 10*3/UL
CONV ABS IMM GRAN: 0.46 10*3/UL
CONV CO2: 25 MMOL/L (ref 22–32)
CONV DOHLE BODY IN BLOOD BY LIGHT MICROSCOPY: (no result)
CONV TOTAL PROTEIN: 6 G/DL (ref 6.1–7.9)
CONV TOXIC GRANULES IN BLOOD BY LIGHT MICROSCOPY: SLIGHT
CREAT UR-MCNC: 0.8 MG/DL (ref 0.4–1)
DIFFERENTIAL METHOD BLD: (no result)
EOSINOPHIL # BLD AUTO: 0.1 10*3/UL (ref 0–0.3)
EOSINOPHIL # BLD AUTO: 2 % (ref 0–3)
ERYTHROCYTE [DISTWIDTH] IN BLOOD BY AUTOMATED COUNT: 14.9 % (ref 11.5–14.5)
GLOBULIN UR ELPH-MCNC: 1.9 G/DL (ref 2.5–3.8)
GLUCOSE SERPL-MCNC: 81 MG/DL (ref 65–99)
HCT VFR BLD AUTO: 39.6 % (ref 35–49)
HGB BLD-MCNC: 13 G/DL (ref 12–15)
LYMPHOCYTES # BLD AUTO: 0.2 10*3/UL (ref 0.8–4.8)
LYMPHOCYTES NFR BLD AUTO: 3 % (ref 18–42)
MAGNESIUM SERPL-MCNC: 1.8 MG/DL (ref 1.8–2.5)
MCH RBC QN AUTO: 32.2 PG (ref 26–32)
MCHC RBC AUTO-ENTMCNC: 32.9 G/DL (ref 32–36)
MCV RBC AUTO: 97.8 FL (ref 80–94)
METAMYELOCYTES NFR BLD: 6 %
MONOCYTES # BLD AUTO: 0.3 10*3/UL (ref 0.1–1.3)
MONOCYTES NFR BLD AUTO: 5 % (ref 2–11)
MYELOCYTES NFR BLD MANUAL: 3 %
NEUTROPHILS # BLD AUTO: 2.1 10*3/UL (ref 2.3–8.6)
NEUTROPHILS NFR BLD AUTO: 43 % (ref 50–75)
NEUTS BAND NFR BLD MANUAL: 38 % (ref 0–5)
PATHOLOGIST REVIEW: (no result)
PHOSPHATE SERPL-MCNC: 2.8 MG/DL (ref 2.4–4.7)
PLATELET # BLD AUTO: 110 10*3/UL (ref 150–450)
PMV BLD AUTO: 8.1 FL (ref 7.4–10.4)
POTASSIUM SERPL-SCNC: 4.3 MMOL/L (ref 3.6–5.1)
RBC # BLD AUTO: 4.05 10*6/UL (ref 4–5.4)
SODIUM SERPL-SCNC: 142 MMOL/L (ref 136–144)
TRIGL SERPL-MCNC: 150 MG/DL
WBC # BLD AUTO: 5.1 10*3/UL (ref 4.5–11.5)

## 2017-09-07 ENCOUNTER — HOSPITAL ENCOUNTER (OUTPATIENT)
Dept: LAB | Facility: HOSPITAL | Age: 72
Setting detail: SPECIMEN
Discharge: HOME | End: 2017-09-07
Attending: TRANSPLANT SURGERY | Admitting: TRANSPLANT SURGERY

## 2017-09-07 LAB
ALBUMIN SERPL-MCNC: 4.1 G/DL (ref 3.5–4.8)
ALBUMIN/GLOB SERPL: 2.3 {RATIO} (ref 1–1.7)
ALP SERPL-CCNC: 100 IU/L (ref 32–91)
ALT SERPL-CCNC: 11 IU/L (ref 14–54)
ANION GAP SERPL CALC-SCNC: 11.4 MMOL/L (ref 10–20)
AST SERPL-CCNC: 17 IU/L (ref 15–41)
BILIRUB SERPL-MCNC: 0.6 MG/DL (ref 0.3–1.2)
BUN SERPL-MCNC: 16 MG/DL (ref 8–20)
BUN/CREAT SERPL: 17.8 (ref 5.4–26.2)
CALCIUM SERPL-MCNC: 10.3 MG/DL (ref 8.9–10.3)
CHLORIDE SERPL-SCNC: 110 MMOL/L (ref 101–111)
CHOLEST SERPL-MCNC: 192 MG/DL
CONV ABS BANDS: 2.6 10*3/UL
CONV ABS IMM GRAN: 0.74 10*3/UL
CONV CO2: 24 MMOL/L (ref 22–32)
CONV MACROCYTES IN BLOOD BY LIGHT MICROSCOPY: SLIGHT
CONV TOTAL PROTEIN: 5.9 G/DL (ref 6.1–7.9)
CREAT UR-MCNC: 0.9 MG/DL (ref 0.4–1)
DIFFERENTIAL METHOD BLD: (no result)
EOSINOPHIL # BLD AUTO: 0.2 10*3/UL (ref 0–0.3)
EOSINOPHIL # BLD AUTO: 3 % (ref 0–3)
ERYTHROCYTE [DISTWIDTH] IN BLOOD BY AUTOMATED COUNT: 15.4 % (ref 11.5–14.5)
GLOBULIN UR ELPH-MCNC: 1.8 G/DL (ref 2.5–3.8)
GLUCOSE SERPL-MCNC: 81 MG/DL (ref 65–99)
HCT VFR BLD AUTO: 43 % (ref 35–49)
HGB BLD-MCNC: 13.9 G/DL (ref 12–15)
LYMPHOCYTES # BLD AUTO: 0.2 10*3/UL (ref 0.8–4.8)
LYMPHOCYTES NFR BLD AUTO: 3 % (ref 18–42)
MAGNESIUM SERPL-MCNC: 1.6 MG/DL (ref 1.8–2.5)
MCH RBC QN AUTO: 32.3 PG (ref 26–32)
MCHC RBC AUTO-ENTMCNC: 32.5 G/DL (ref 32–36)
MCV RBC AUTO: 99.5 FL (ref 80–94)
METAMYELOCYTES NFR BLD: 8 %
MONOCYTES # BLD AUTO: 0.5 10*3/UL (ref 0.1–1.3)
MONOCYTES NFR BLD AUTO: 7 % (ref 2–11)
MYELOCYTES NFR BLD MANUAL: 3 %
NEUTROPHILS # BLD AUTO: 2.5 10*3/UL (ref 2.3–8.6)
NEUTROPHILS NFR BLD AUTO: 37 % (ref 50–75)
NEUTS BAND NFR BLD MANUAL: 39 % (ref 0–5)
PATHOLOGIST REVIEW: (no result)
PHOSPHATE SERPL-MCNC: 2.9 MG/DL (ref 2.4–4.7)
PLATELET # BLD AUTO: 100 10*3/UL (ref 150–450)
PLT COMMENT: (no result)
PMV BLD AUTO: 8.5 FL (ref 7.4–10.4)
POTASSIUM SERPL-SCNC: 4.4 MMOL/L (ref 3.6–5.1)
RBC # BLD AUTO: 4.32 10*6/UL (ref 4–5.4)
SODIUM SERPL-SCNC: 141 MMOL/L (ref 136–144)
TRIGL SERPL-MCNC: 166 MG/DL
WBC # BLD AUTO: 6.7 10*3/UL (ref 4.5–11.5)

## 2017-09-11 ENCOUNTER — HOSPITAL ENCOUNTER (OUTPATIENT)
Dept: LAB | Facility: HOSPITAL | Age: 72
Setting detail: SPECIMEN
Discharge: HOME | End: 2017-09-11
Attending: TRANSPLANT SURGERY | Admitting: TRANSPLANT SURGERY

## 2017-09-11 LAB
ALBUMIN SERPL-MCNC: 4 G/DL (ref 3.5–4.8)
ALBUMIN/GLOB SERPL: 2.4 {RATIO} (ref 1–1.7)
ALP SERPL-CCNC: 97 IU/L (ref 32–91)
ALT SERPL-CCNC: 13 IU/L (ref 14–54)
ANION GAP SERPL CALC-SCNC: 9.3 MMOL/L (ref 10–20)
AST SERPL-CCNC: 18 IU/L (ref 15–41)
BILIRUB SERPL-MCNC: 0.8 MG/DL (ref 0.3–1.2)
BUN SERPL-MCNC: 15 MG/DL (ref 8–20)
BUN/CREAT SERPL: 16.7 (ref 5.4–26.2)
CALCIUM SERPL-MCNC: 10.2 MG/DL (ref 8.9–10.3)
CHLORIDE SERPL-SCNC: 110 MMOL/L (ref 101–111)
CHOLEST SERPL-MCNC: 196 MG/DL
CONV ABS BANDS: 1.4 10*3/UL
CONV ABS IMM GRAN: 0.21 10*3/UL
CONV CO2: 25 MMOL/L (ref 22–32)
CONV TOTAL PROTEIN: 5.7 G/DL (ref 6.1–7.9)
CREAT UR-MCNC: 0.9 MG/DL (ref 0.4–1)
DIFFERENTIAL METHOD BLD: (no result)
EOSINOPHIL # BLD AUTO: 0.2 10*3/UL (ref 0–0.3)
EOSINOPHIL # BLD AUTO: 4 % (ref 0–3)
ERYTHROCYTE [DISTWIDTH] IN BLOOD BY AUTOMATED COUNT: 14.6 % (ref 11.5–14.5)
GLOBULIN UR ELPH-MCNC: 1.7 G/DL (ref 2.5–3.8)
GLUCOSE SERPL-MCNC: 75 MG/DL (ref 65–99)
HCT VFR BLD AUTO: 41.2 % (ref 35–49)
HGB BLD-MCNC: 13.4 G/DL (ref 12–15)
LYMPHOCYTES # BLD AUTO: 0.3 10*3/UL (ref 0.8–4.8)
LYMPHOCYTES NFR BLD AUTO: 7 % (ref 18–42)
MAGNESIUM SERPL-MCNC: 1.7 MG/DL (ref 1.8–2.5)
MCH RBC QN AUTO: 32 PG (ref 26–32)
MCHC RBC AUTO-ENTMCNC: 32.5 G/DL (ref 32–36)
MCV RBC AUTO: 98.6 FL (ref 80–94)
METAMYELOCYTES NFR BLD: 4 %
MONOCYTES # BLD AUTO: 0.1 10*3/UL (ref 0.1–1.3)
MONOCYTES NFR BLD AUTO: 2 % (ref 2–11)
MYELOCYTES NFR BLD MANUAL: 1 %
NEUTROPHILS # BLD AUTO: 2 10*3/UL (ref 2.3–8.6)
NEUTROPHILS NFR BLD AUTO: 49 % (ref 50–75)
NEUTS BAND NFR BLD MANUAL: 33 % (ref 0–5)
PATHOLOGIST REVIEW: (no result)
PHOSPHATE SERPL-MCNC: 2.9 MG/DL (ref 2.4–4.7)
PLATELET # BLD AUTO: 112 10*3/UL (ref 150–450)
PLT COMMENT: (no result)
PMV BLD AUTO: 7.8 FL (ref 7.4–10.4)
POTASSIUM SERPL-SCNC: 4.3 MMOL/L (ref 3.6–5.1)
RBC # BLD AUTO: 4.18 10*6/UL (ref 4–5.4)
SODIUM SERPL-SCNC: 140 MMOL/L (ref 136–144)
TRIGL SERPL-MCNC: 135 MG/DL
WBC # BLD AUTO: 4.2 10*3/UL (ref 4.5–11.5)

## 2017-09-14 ENCOUNTER — HOSPITAL ENCOUNTER (OUTPATIENT)
Dept: LAB | Facility: HOSPITAL | Age: 72
Setting detail: SPECIMEN
Discharge: HOME | End: 2017-09-14
Attending: TRANSPLANT SURGERY | Admitting: TRANSPLANT SURGERY

## 2017-09-14 LAB
ALBUMIN SERPL-MCNC: 4 G/DL (ref 3.5–4.8)
ALBUMIN/GLOB SERPL: 2.2 {RATIO} (ref 1–1.7)
ALP SERPL-CCNC: 98 IU/L (ref 32–91)
ALT SERPL-CCNC: 13 IU/L (ref 14–54)
ANION GAP SERPL CALC-SCNC: 10.2 MMOL/L (ref 10–20)
AST SERPL-CCNC: 16 IU/L (ref 15–41)
BASOPHILS # BLD AUTO: 0.1 10*3/UL (ref 0–0.2)
BASOPHILS NFR BLD AUTO: 2 % (ref 0–2)
BILIRUB SERPL-MCNC: 0.9 MG/DL (ref 0.3–1.2)
BUN SERPL-MCNC: 15 MG/DL (ref 8–20)
BUN/CREAT SERPL: 18.8 (ref 5.4–26.2)
CALCIUM SERPL-MCNC: 10.2 MG/DL (ref 8.9–10.3)
CHLORIDE SERPL-SCNC: 110 MMOL/L (ref 101–111)
CHOLEST SERPL-MCNC: 188 MG/DL
CONV ABS BANDS: 0.7 10*3/UL
CONV ABS IMM GRAN: 0.88 10*3/UL
CONV ANISOCYTES: SLIGHT
CONV CO2: 24 MMOL/L (ref 22–32)
CONV MACROCYTES IN BLOOD BY LIGHT MICROSCOPY: SLIGHT
CONV POIKILOCYTOSIS IN BLOOD BY LIGHT MICROSCOPY: SLIGHT
CONV POLYCHROMASIA IN BLOOD BY LIGHT MICROSCOPY: SLIGHT
CONV TOTAL PROTEIN: 5.8 G/DL (ref 6.1–7.9)
CREAT UR-MCNC: 0.8 MG/DL (ref 0.4–1)
DIFFERENTIAL METHOD BLD: (no result)
EOSINOPHIL # BLD AUTO: 0.1 10*3/UL (ref 0–0.3)
EOSINOPHIL # BLD AUTO: 3 % (ref 0–3)
ERYTHROCYTE [DISTWIDTH] IN BLOOD BY AUTOMATED COUNT: 15 % (ref 11.5–14.5)
GLOBULIN UR ELPH-MCNC: 1.8 G/DL (ref 2.5–3.8)
GLUCOSE SERPL-MCNC: 77 MG/DL (ref 65–99)
HCT VFR BLD AUTO: 41.3 % (ref 35–49)
HGB BLD-MCNC: 13.3 G/DL (ref 12–15)
LYMPHOCYTES # BLD AUTO: 0.3 10*3/UL (ref 0.8–4.8)
LYMPHOCYTES NFR BLD AUTO: 9 % (ref 18–42)
MAGNESIUM SERPL-MCNC: 1.7 MG/DL (ref 1.8–2.5)
MCH RBC QN AUTO: 32 PG (ref 26–32)
MCHC RBC AUTO-ENTMCNC: 32.3 G/DL (ref 32–36)
MCV RBC AUTO: 99.1 FL (ref 80–94)
METAMYELOCYTES NFR BLD: 21 %
MONOCYTES # BLD AUTO: 0.1 10*3/UL (ref 0.1–1.3)
MONOCYTES NFR BLD AUTO: 4 % (ref 2–11)
MYELOCYTES NFR BLD MANUAL: 5 %
NEUTROPHILS # BLD AUTO: 1.2 10*3/UL (ref 2.3–8.6)
NEUTROPHILS NFR BLD AUTO: 34 % (ref 50–75)
NEUTS BAND NFR BLD MANUAL: 22 % (ref 0–5)
PATHOLOGIST REVIEW: (no result)
PHOSPHATE SERPL-MCNC: 2.7 MG/DL (ref 2.4–4.7)
PLATELET # BLD AUTO: 125 10*3/UL (ref 150–450)
PMV BLD AUTO: 8 FL (ref 7.4–10.4)
POTASSIUM SERPL-SCNC: 4.2 MMOL/L (ref 3.6–5.1)
RBC # BLD AUTO: 4.17 10*6/UL (ref 4–5.4)
SODIUM SERPL-SCNC: 140 MMOL/L (ref 136–144)
TRIGL SERPL-MCNC: 123 MG/DL
WBC # BLD AUTO: 3.4 10*3/UL (ref 4.5–11.5)

## 2017-09-18 ENCOUNTER — HOSPITAL ENCOUNTER (OUTPATIENT)
Dept: LAB | Facility: HOSPITAL | Age: 72
Setting detail: SPECIMEN
Discharge: HOME | End: 2017-09-18
Attending: TRANSPLANT SURGERY | Admitting: TRANSPLANT SURGERY

## 2017-09-18 LAB
ALBUMIN SERPL-MCNC: 4 G/DL (ref 3.5–4.8)
ALBUMIN/GLOB SERPL: 2.5 {RATIO} (ref 1–1.7)
ALP SERPL-CCNC: 95 IU/L (ref 32–91)
ALT SERPL-CCNC: 12 IU/L (ref 14–54)
ANION GAP SERPL CALC-SCNC: 8 MMOL/L (ref 10–20)
AST SERPL-CCNC: 18 IU/L (ref 15–41)
BILIRUB SERPL-MCNC: 0.8 MG/DL (ref 0.3–1.2)
BUN SERPL-MCNC: 10 MG/DL (ref 8–20)
BUN/CREAT SERPL: 11.1 (ref 5.4–26.2)
CALCIUM SERPL-MCNC: 10.2 MG/DL (ref 8.9–10.3)
CHLORIDE SERPL-SCNC: 111 MMOL/L (ref 101–111)
CHOLEST SERPL-MCNC: 180 MG/DL
CONV ABS BANDS: 1.7 10*3/UL
CONV ABS IMM GRAN: 0.43 10*3/UL
CONV CO2: 25 MMOL/L (ref 22–32)
CONV MACROCYTES IN BLOOD BY LIGHT MICROSCOPY: SLIGHT
CONV TOTAL PROTEIN: 5.6 G/DL (ref 6.1–7.9)
CREAT UR-MCNC: 0.9 MG/DL (ref 0.4–1)
DIFFERENTIAL METHOD BLD: (no result)
EOSINOPHIL # BLD AUTO: 0 10*3/UL (ref 0–0.3)
EOSINOPHIL # BLD AUTO: 1 % (ref 0–3)
ERYTHROCYTE [DISTWIDTH] IN BLOOD BY AUTOMATED COUNT: 14.8 % (ref 11.5–14.5)
GLOBULIN UR ELPH-MCNC: 1.6 G/DL (ref 2.5–3.8)
GLUCOSE SERPL-MCNC: 76 MG/DL (ref 65–99)
HCT VFR BLD AUTO: 41.4 % (ref 35–49)
HGB BLD-MCNC: 13.6 G/DL (ref 12–15)
LYMPHOCYTES # BLD AUTO: 0.1 10*3/UL (ref 0.8–4.8)
LYMPHOCYTES NFR BLD AUTO: 3 % (ref 18–42)
MAGNESIUM SERPL-MCNC: 1.7 MG/DL (ref 1.8–2.5)
MCH RBC QN AUTO: 32.4 PG (ref 26–32)
MCHC RBC AUTO-ENTMCNC: 32.7 G/DL (ref 32–36)
MCV RBC AUTO: 99.2 FL (ref 80–94)
METAMYELOCYTES NFR BLD: 11 %
MONOCYTES # BLD AUTO: 0.2 10*3/UL (ref 0.1–1.3)
MONOCYTES NFR BLD AUTO: 6 % (ref 2–11)
MYELOCYTES NFR BLD MANUAL: 2 %
NEUTROPHILS # BLD AUTO: 0.9 10*3/UL (ref 2.3–8.6)
NEUTROPHILS NFR BLD AUTO: 27 % (ref 50–75)
NEUTS BAND NFR BLD MANUAL: 50 % (ref 0–5)
PATHOLOGIST REVIEW: (no result)
PHOSPHATE SERPL-MCNC: 2.6 MG/DL (ref 2.4–4.7)
PLATELET # BLD AUTO: 131 10*3/UL (ref 150–450)
PMV BLD AUTO: 7.7 FL (ref 7.4–10.4)
POTASSIUM SERPL-SCNC: 4 MMOL/L (ref 3.6–5.1)
RBC # BLD AUTO: 4.18 10*6/UL (ref 4–5.4)
SODIUM SERPL-SCNC: 140 MMOL/L (ref 136–144)
TRIGL SERPL-MCNC: 122 MG/DL
WBC # BLD AUTO: 3.3 10*3/UL (ref 4.5–11.5)

## 2017-09-21 ENCOUNTER — HOSPITAL ENCOUNTER (OUTPATIENT)
Dept: LAB | Facility: HOSPITAL | Age: 72
Setting detail: SPECIMEN
Discharge: HOME | End: 2017-09-21
Attending: TRANSPLANT SURGERY | Admitting: TRANSPLANT SURGERY

## 2017-09-21 LAB
ALBUMIN SERPL-MCNC: 4 G/DL (ref 3.5–4.8)
ALBUMIN/GLOB SERPL: 2.5 {RATIO} (ref 1–1.7)
ALP SERPL-CCNC: 95 IU/L (ref 32–91)
ALT SERPL-CCNC: 12 IU/L (ref 14–54)
ANION GAP SERPL CALC-SCNC: 9 MMOL/L (ref 10–20)
AST SERPL-CCNC: 16 IU/L (ref 15–41)
BILIRUB SERPL-MCNC: 1.2 MG/DL (ref 0.3–1.2)
BUN SERPL-MCNC: 9 MG/DL (ref 8–20)
BUN/CREAT SERPL: 10 (ref 5.4–26.2)
CALCIUM SERPL-MCNC: 10.3 MG/DL (ref 8.9–10.3)
CHLORIDE SERPL-SCNC: 111 MMOL/L (ref 101–111)
CHOLEST SERPL-MCNC: 181 MG/DL
CONV ABS BANDS: 0.8 10*3/UL
CONV ABS IMM GRAN: 0.38 10*3/UL
CONV CO2: 24 MMOL/L (ref 22–32)
CONV HYPOCHROMIA IN BLOOD BY LIGHT MICROSCOPY: SLIGHT
CONV MACROCYTES IN BLOOD BY LIGHT MICROSCOPY: SLIGHT
CONV POIKILOCYTOSIS IN BLOOD BY LIGHT MICROSCOPY: SLIGHT
CONV POLYCHROMASIA IN BLOOD BY LIGHT MICROSCOPY: SLIGHT
CONV TOTAL PROTEIN: 5.6 G/DL (ref 6.1–7.9)
CREAT UR-MCNC: 0.9 MG/DL (ref 0.4–1)
DIFFERENTIAL METHOD BLD: (no result)
ERYTHROCYTE [DISTWIDTH] IN BLOOD BY AUTOMATED COUNT: 14.5 % (ref 11.5–14.5)
GLOBULIN UR ELPH-MCNC: 1.6 G/DL (ref 2.5–3.8)
GLUCOSE SERPL-MCNC: 80 MG/DL (ref 65–99)
HCT VFR BLD AUTO: 41.8 % (ref 35–49)
HGB BLD-MCNC: 13.6 G/DL (ref 12–15)
LYMPHOCYTES # BLD AUTO: 0.2 10*3/UL (ref 0.8–4.8)
LYMPHOCYTES NFR BLD AUTO: 7 % (ref 18–42)
MAGNESIUM SERPL-MCNC: 1.7 MG/DL (ref 1.8–2.5)
MCH RBC QN AUTO: 32.3 PG (ref 26–32)
MCHC RBC AUTO-ENTMCNC: 32.6 G/DL (ref 32–36)
MCV RBC AUTO: 98.9 FL (ref 80–94)
METAMYELOCYTES NFR BLD: 7 %
MONOCYTES # BLD AUTO: 0.2 10*3/UL (ref 0.1–1.3)
MONOCYTES NFR BLD AUTO: 6 % (ref 2–11)
MYELOCYTES NFR BLD MANUAL: 6 %
NEUTROPHILS # BLD AUTO: 1.3 10*3/UL (ref 2.3–8.6)
NEUTROPHILS NFR BLD AUTO: 46 % (ref 50–75)
NEUTS BAND NFR BLD MANUAL: 28 % (ref 0–5)
PATHOLOGIST REVIEW: (no result)
PATHOLOGIST REVIEW: (no result)
PHOSPHATE SERPL-MCNC: 2.8 MG/DL (ref 2.4–4.7)
PLATELET # BLD AUTO: 127 10*3/UL (ref 150–450)
PMV BLD AUTO: 7.6 FL (ref 7.4–10.4)
POTASSIUM SERPL-SCNC: 4 MMOL/L (ref 3.6–5.1)
RBC # BLD AUTO: 4.23 10*6/UL (ref 4–5.4)
SODIUM SERPL-SCNC: 140 MMOL/L (ref 136–144)
TRIGL SERPL-MCNC: 151 MG/DL
WBC # BLD AUTO: 2.9 10*3/UL (ref 4.5–11.5)

## 2017-09-25 ENCOUNTER — HOSPITAL ENCOUNTER (OUTPATIENT)
Dept: LAB | Facility: HOSPITAL | Age: 72
Setting detail: SPECIMEN
Discharge: HOME | End: 2017-09-25
Attending: TRANSPLANT SURGERY | Admitting: TRANSPLANT SURGERY

## 2017-09-25 LAB
ALBUMIN SERPL-MCNC: 4 G/DL (ref 3.5–4.8)
ALBUMIN/GLOB SERPL: 2.2 {RATIO} (ref 1–1.7)
ALP SERPL-CCNC: 97 IU/L (ref 32–91)
ALT SERPL-CCNC: 12 IU/L (ref 14–54)
ANION GAP SERPL CALC-SCNC: 7.4 MMOL/L (ref 10–20)
AST SERPL-CCNC: 14 IU/L (ref 15–41)
BASOPHILS # BLD AUTO: 0.1 10*3/UL (ref 0–0.2)
BASOPHILS NFR BLD AUTO: 2 % (ref 0–2)
BILIRUB SERPL-MCNC: 0.7 MG/DL (ref 0.3–1.2)
BUN SERPL-MCNC: 14 MG/DL (ref 8–20)
BUN/CREAT SERPL: 15.6 (ref 5.4–26.2)
CALCIUM SERPL-MCNC: 10.3 MG/DL (ref 8.9–10.3)
CHLORIDE SERPL-SCNC: 112 MMOL/L (ref 101–111)
CHOLEST SERPL-MCNC: 168 MG/DL
CONV ABS BANDS: 1.8 10*3/UL
CONV ABS IMM GRAN: 0.46 10*3/UL
CONV CO2: 26 MMOL/L (ref 22–32)
CONV TOTAL PROTEIN: 5.8 G/DL (ref 6.1–7.9)
CREAT UR-MCNC: 0.9 MG/DL (ref 0.4–1)
DIFFERENTIAL METHOD BLD: (no result)
EOSINOPHIL # BLD AUTO: 0.1 10*3/UL (ref 0–0.3)
EOSINOPHIL # BLD AUTO: 2 % (ref 0–3)
ERYTHROCYTE [DISTWIDTH] IN BLOOD BY AUTOMATED COUNT: 14.9 % (ref 11.5–14.5)
GLOBULIN UR ELPH-MCNC: 1.8 G/DL (ref 2.5–3.8)
GLUCOSE SERPL-MCNC: 78 MG/DL (ref 65–99)
HCT VFR BLD AUTO: 42.5 % (ref 35–49)
HGB BLD-MCNC: 14 G/DL (ref 12–15)
LYMPHOCYTES # BLD AUTO: 0.2 10*3/UL (ref 0.8–4.8)
LYMPHOCYTES NFR BLD AUTO: 7 % (ref 18–42)
MAGNESIUM SERPL-MCNC: 1.6 MG/DL (ref 1.8–2.5)
MCH RBC QN AUTO: 32.5 PG (ref 26–32)
MCHC RBC AUTO-ENTMCNC: 32.9 G/DL (ref 32–36)
MCV RBC AUTO: 98.5 FL (ref 80–94)
METAMYELOCYTES NFR BLD: 13 %
MONOCYTES # BLD AUTO: 0.2 10*3/UL (ref 0.1–1.3)
MONOCYTES NFR BLD AUTO: 6 % (ref 2–11)
MYELOCYTES NFR BLD MANUAL: 1 %
NEUTROPHILS # BLD AUTO: 0.4 10*3/UL (ref 2.3–8.6)
NEUTROPHILS NFR BLD AUTO: 13 % (ref 50–75)
NEUTS BAND NFR BLD MANUAL: 56 % (ref 0–5)
PATHOLOGIST REVIEW: (no result)
PHOSPHATE SERPL-MCNC: 3.1 MG/DL (ref 2.4–4.7)
PLATELET # BLD AUTO: 138 10*3/UL (ref 150–450)
PMV BLD AUTO: 7.2 FL (ref 7.4–10.4)
POTASSIUM SERPL-SCNC: 4.4 MMOL/L (ref 3.6–5.1)
RBC # BLD AUTO: 4.32 10*6/UL (ref 4–5.4)
SODIUM SERPL-SCNC: 141 MMOL/L (ref 136–144)
TRIGL SERPL-MCNC: 135 MG/DL
WBC # BLD AUTO: 3.3 10*3/UL (ref 4.5–11.5)

## 2017-10-02 ENCOUNTER — HOSPITAL ENCOUNTER (OUTPATIENT)
Dept: LAB | Facility: HOSPITAL | Age: 72
Setting detail: SPECIMEN
Discharge: HOME | End: 2017-10-02
Attending: TRANSPLANT SURGERY | Admitting: TRANSPLANT SURGERY

## 2017-10-02 LAB
ALBUMIN SERPL-MCNC: 3.8 G/DL (ref 3.5–4.8)
ALBUMIN/GLOB SERPL: 2.2 {RATIO} (ref 1–1.7)
ALP SERPL-CCNC: 83 IU/L (ref 32–91)
ALT SERPL-CCNC: 12 IU/L (ref 14–54)
ANION GAP SERPL CALC-SCNC: 9.3 MMOL/L (ref 10–20)
AST SERPL-CCNC: 16 IU/L (ref 15–41)
BASOPHILS # BLD AUTO: 0 10*3/UL (ref 0–0.2)
BASOPHILS NFR BLD AUTO: 1 % (ref 0–2)
BILIRUB SERPL-MCNC: 0.7 MG/DL (ref 0.3–1.2)
BUN SERPL-MCNC: 12 MG/DL (ref 8–20)
BUN/CREAT SERPL: 12 (ref 5.4–26.2)
CALCIUM SERPL-MCNC: 10.1 MG/DL (ref 8.9–10.3)
CHLORIDE SERPL-SCNC: 109 MMOL/L (ref 101–111)
CHOLEST SERPL-MCNC: 174 MG/DL
CONV ABS BANDS: 1.3 10*3/UL
CONV ABS IMM GRAN: 0.95 10*3/UL
CONV ANISOCYTES: SLIGHT
CONV CO2: 26 MMOL/L (ref 22–32)
CONV MACROCYTES IN BLOOD BY LIGHT MICROSCOPY: SLIGHT
CONV POIKILOCYTOSIS IN BLOOD BY LIGHT MICROSCOPY: SLIGHT
CONV POLYCHROMASIA IN BLOOD BY LIGHT MICROSCOPY: SLIGHT
CONV TOTAL PROTEIN: 5.5 G/DL (ref 6.1–7.9)
CREAT UR-MCNC: 1 MG/DL (ref 0.4–1)
DIFFERENTIAL METHOD BLD: (no result)
ERYTHROCYTE [DISTWIDTH] IN BLOOD BY AUTOMATED COUNT: 14.7 % (ref 11.5–14.5)
GLOBULIN UR ELPH-MCNC: 1.7 G/DL (ref 2.5–3.8)
GLUCOSE SERPL-MCNC: 76 MG/DL (ref 65–99)
HCT VFR BLD AUTO: 43.2 % (ref 35–49)
HGB BLD-MCNC: 14.1 G/DL (ref 12–15)
LYMPHOCYTES # BLD AUTO: 0.5 10*3/UL (ref 0.8–4.8)
LYMPHOCYTES NFR BLD AUTO: 11 % (ref 18–42)
MAGNESIUM SERPL-MCNC: 1.7 MG/DL (ref 1.8–2.5)
MCH RBC QN AUTO: 32.2 PG (ref 26–32)
MCHC RBC AUTO-ENTMCNC: 32.5 G/DL (ref 32–36)
MCV RBC AUTO: 99 FL (ref 80–94)
METAMYELOCYTES NFR BLD: 18 %
MONOCYTES # BLD AUTO: 0.5 10*3/UL (ref 0.1–1.3)
MONOCYTES NFR BLD AUTO: 12 % (ref 2–11)
MYELOCYTES NFR BLD MANUAL: 4 %
NEUTROPHILS # BLD AUTO: 1.1 10*3/UL (ref 2.3–8.6)
NEUTROPHILS NFR BLD AUTO: 26 % (ref 50–75)
NEUTS BAND NFR BLD MANUAL: 28 % (ref 0–5)
PATHOLOGIST REVIEW: (no result)
PHOSPHATE SERPL-MCNC: 3 MG/DL (ref 2.4–4.7)
PLATELET # BLD AUTO: 136 10*3/UL (ref 150–450)
PMV BLD AUTO: 7.9 FL (ref 7.4–10.4)
POTASSIUM SERPL-SCNC: 4.3 MMOL/L (ref 3.6–5.1)
RBC # BLD AUTO: 4.36 10*6/UL (ref 4–5.4)
SODIUM SERPL-SCNC: 140 MMOL/L (ref 136–144)
TRIGL SERPL-MCNC: 130 MG/DL
WBC # BLD AUTO: 4.3 10*3/UL (ref 4.5–11.5)

## 2017-10-09 ENCOUNTER — HOSPITAL ENCOUNTER (OUTPATIENT)
Dept: LAB | Facility: HOSPITAL | Age: 72
Discharge: HOME | End: 2017-10-09
Attending: TRANSPLANT SURGERY | Admitting: TRANSPLANT SURGERY

## 2017-10-09 LAB
ALBUMIN SERPL-MCNC: 3.8 G/DL (ref 3.5–4.8)
ALBUMIN/GLOB SERPL: 2.2 {RATIO} (ref 1–1.7)
ALP SERPL-CCNC: 85 IU/L (ref 32–91)
ALT SERPL-CCNC: 13 IU/L (ref 14–54)
ANION GAP SERPL CALC-SCNC: 7.3 MMOL/L (ref 10–20)
AST SERPL-CCNC: 16 IU/L (ref 15–41)
BILIRUB SERPL-MCNC: 0.6 MG/DL (ref 0.3–1.2)
BUN SERPL-MCNC: 8 MG/DL (ref 8–20)
BUN/CREAT SERPL: 8.9 (ref 5.4–26.2)
CALCIUM SERPL-MCNC: 10.1 MG/DL (ref 8.9–10.3)
CHLORIDE SERPL-SCNC: 112 MMOL/L (ref 101–111)
CHOLEST SERPL-MCNC: 178 MG/DL
CONV ABS BANDS: 2.9 10*3/UL
CONV ABS IMM GRAN: 1.45 10*3/UL
CONV ANISOCYTES: SLIGHT
CONV CO2: 27 MMOL/L (ref 22–32)
CONV MACROCYTES IN BLOOD BY LIGHT MICROSCOPY: SLIGHT
CONV POIKILOCYTOSIS IN BLOOD BY LIGHT MICROSCOPY: SLIGHT
CONV POLYCHROMASIA IN BLOOD BY LIGHT MICROSCOPY: SLIGHT
CONV TOTAL PROTEIN: 5.5 G/DL (ref 6.1–7.9)
CREAT UR-MCNC: 0.9 MG/DL (ref 0.4–1)
DIFFERENTIAL METHOD BLD: (no result)
EOSINOPHIL # BLD AUTO: 0.1 10*3/UL (ref 0–0.3)
EOSINOPHIL # BLD AUTO: 1 % (ref 0–3)
ERYTHROCYTE [DISTWIDTH] IN BLOOD BY AUTOMATED COUNT: 14.8 % (ref 11.5–14.5)
GLOBULIN UR ELPH-MCNC: 1.7 G/DL (ref 2.5–3.8)
GLUCOSE SERPL-MCNC: 84 MG/DL (ref 65–99)
HCT VFR BLD AUTO: 43.7 % (ref 35–49)
HGB BLD-MCNC: 14.1 G/DL (ref 12–15)
LYMPHOCYTES # BLD AUTO: 0.1 10*3/UL (ref 0.8–4.8)
LYMPHOCYTES NFR BLD AUTO: 2 % (ref 18–42)
MAGNESIUM SERPL-MCNC: 1.7 MG/DL (ref 1.8–2.5)
MCH RBC QN AUTO: 32.2 PG (ref 26–32)
MCHC RBC AUTO-ENTMCNC: 32.3 G/DL (ref 32–36)
MCV RBC AUTO: 99.7 FL (ref 80–94)
METAMYELOCYTES NFR BLD: 18 %
MONOCYTES # BLD AUTO: 0.8 10*3/UL (ref 0.1–1.3)
MONOCYTES NFR BLD AUTO: 12 % (ref 2–11)
MYELOCYTES NFR BLD MANUAL: 5 %
NEUTROPHILS # BLD AUTO: 0.9 10*3/UL (ref 2.3–8.6)
NEUTROPHILS NFR BLD AUTO: 15 % (ref 50–75)
NEUTS BAND NFR BLD MANUAL: 47 % (ref 0–5)
PATHOLOGIST REVIEW: (no result)
PHOSPHATE SERPL-MCNC: 3 MG/DL (ref 2.4–4.7)
PLATELET # BLD AUTO: 130 10*3/UL (ref 150–450)
PMV BLD AUTO: 7.6 FL (ref 7.4–10.4)
POTASSIUM SERPL-SCNC: 4.3 MMOL/L (ref 3.6–5.1)
RBC # BLD AUTO: 4.38 10*6/UL (ref 4–5.4)
SODIUM SERPL-SCNC: 142 MMOL/L (ref 136–144)
TRIGL SERPL-MCNC: 139 MG/DL
WBC # BLD AUTO: 6.3 10*3/UL (ref 4.5–11.5)

## 2017-10-16 ENCOUNTER — HOSPITAL ENCOUNTER (OUTPATIENT)
Dept: LAB | Facility: HOSPITAL | Age: 72
Setting detail: SPECIMEN
Discharge: HOME | End: 2017-10-16
Attending: TRANSPLANT SURGERY | Admitting: TRANSPLANT SURGERY

## 2017-10-16 LAB
ABS VARIANT LYMPHS: 0.06 10*3/UL
ALBUMIN SERPL-MCNC: 4 G/DL (ref 3.5–4.8)
ALBUMIN/GLOB SERPL: 1.9 {RATIO} (ref 1–1.7)
ALP SERPL-CCNC: 85 IU/L (ref 32–91)
ALT SERPL-CCNC: 16 IU/L (ref 14–54)
ANION GAP SERPL CALC-SCNC: 9.9 MMOL/L (ref 10–20)
AST SERPL-CCNC: 21 IU/L (ref 15–41)
BILIRUB SERPL-MCNC: 0.6 MG/DL (ref 0.3–1.2)
BUN SERPL-MCNC: 13 MG/DL (ref 8–20)
BUN/CREAT SERPL: 13 (ref 5.4–26.2)
CALCIUM SERPL-MCNC: 10.3 MG/DL (ref 8.9–10.3)
CHLORIDE SERPL-SCNC: 110 MMOL/L (ref 101–111)
CHOLEST SERPL-MCNC: 189 MG/DL
CONV ABS BANDS: 1.3 10*3/UL
CONV ABS IMM GRAN: 0.89 10*3/UL
CONV CO2: 25 MMOL/L (ref 22–32)
CONV POIKILOCYTOSIS IN BLOOD BY LIGHT MICROSCOPY: SLIGHT
CONV TOTAL PROTEIN: 6.1 G/DL (ref 6.1–7.9)
CONV VARIANT LYMPHOCYTES RELATIVE PERCENT BY MANUAL COUNT: 1 % (ref 0–1)
CREAT UR-MCNC: 1 MG/DL (ref 0.4–1)
DIFFERENTIAL METHOD BLD: (no result)
EOSINOPHIL # BLD AUTO: 0.1 10*3/UL (ref 0–0.3)
EOSINOPHIL # BLD AUTO: 1 % (ref 0–3)
ERYTHROCYTE [DISTWIDTH] IN BLOOD BY AUTOMATED COUNT: 14.4 % (ref 11.5–14.5)
GLOBULIN UR ELPH-MCNC: 2.1 G/DL (ref 2.5–3.8)
GLUCOSE SERPL-MCNC: 82 MG/DL (ref 65–99)
HCT VFR BLD AUTO: 43.8 % (ref 35–49)
HGB BLD-MCNC: 14.5 G/DL (ref 12–15)
LYMPHOCYTES # BLD AUTO: 0.5 10*3/UL (ref 0.8–4.8)
LYMPHOCYTES NFR BLD AUTO: 9 % (ref 18–42)
MAGNESIUM SERPL-MCNC: 1.7 MG/DL (ref 1.8–2.5)
MCH RBC QN AUTO: 32.4 PG (ref 26–32)
MCHC RBC AUTO-ENTMCNC: 33 G/DL (ref 32–36)
MCV RBC AUTO: 98.4 FL (ref 80–94)
METAMYELOCYTES NFR BLD: 15 %
MONOCYTES # BLD AUTO: 0.9 10*3/UL (ref 0.1–1.3)
MONOCYTES NFR BLD AUTO: 16 % (ref 2–11)
NEUTROPHILS # BLD AUTO: 2.2 10*3/UL (ref 2.3–8.6)
NEUTROPHILS NFR BLD AUTO: 36 % (ref 50–75)
NEUTS BAND NFR BLD MANUAL: 22 % (ref 0–5)
PATHOLOGIST REVIEW: (no result)
PHOSPHATE SERPL-MCNC: 3.1 MG/DL (ref 2.4–4.7)
PLATELET # BLD AUTO: 138 10*3/UL (ref 150–450)
PMV BLD AUTO: 8 FL (ref 7.4–10.4)
POTASSIUM SERPL-SCNC: 3.9 MMOL/L (ref 3.6–5.1)
RBC # BLD AUTO: 4.46 10*6/UL (ref 4–5.4)
SODIUM SERPL-SCNC: 141 MMOL/L (ref 136–144)
TRIGL SERPL-MCNC: 138 MG/DL
WBC # BLD AUTO: 5.9 10*3/UL (ref 4.5–11.5)

## 2017-10-23 ENCOUNTER — HOSPITAL ENCOUNTER (OUTPATIENT)
Dept: LAB | Facility: HOSPITAL | Age: 72
Setting detail: SPECIMEN
Discharge: HOME | End: 2017-10-23
Attending: TRANSPLANT SURGERY | Admitting: TRANSPLANT SURGERY

## 2017-10-23 LAB
ALBUMIN SERPL-MCNC: 4.1 G/DL (ref 3.5–4.8)
ALBUMIN/GLOB SERPL: 2.4 {RATIO} (ref 1–1.7)
ALP SERPL-CCNC: 87 IU/L (ref 32–91)
ALT SERPL-CCNC: 16 IU/L (ref 14–54)
ANION GAP SERPL CALC-SCNC: 11.8 MMOL/L (ref 10–20)
AST SERPL-CCNC: 19 IU/L (ref 15–41)
BASOPHILS # BLD AUTO: 0.1 10*3/UL (ref 0–0.2)
BASOPHILS NFR BLD AUTO: 2 % (ref 0–2)
BILIRUB SERPL-MCNC: 0.7 MG/DL (ref 0.3–1.2)
BUN SERPL-MCNC: 13 MG/DL (ref 8–20)
BUN/CREAT SERPL: 14.4 (ref 5.4–26.2)
CALCIUM SERPL-MCNC: 10.4 MG/DL (ref 8.9–10.3)
CHLORIDE SERPL-SCNC: 109 MMOL/L (ref 101–111)
CHOLEST SERPL-MCNC: 183 MG/DL
CONV ABS BANDS: 3.2 10*3/UL
CONV ABS IMM GRAN: 0.67 10*3/UL
CONV CO2: 24 MMOL/L (ref 22–32)
CONV TOTAL PROTEIN: 5.8 G/DL (ref 6.1–7.9)
CREAT UR-MCNC: 0.9 MG/DL (ref 0.4–1)
DIFFERENTIAL METHOD BLD: (no result)
ERYTHROCYTE [DISTWIDTH] IN BLOOD BY AUTOMATED COUNT: 14.3 % (ref 11.5–14.5)
GLOBULIN UR ELPH-MCNC: 1.7 G/DL (ref 2.5–3.8)
GLUCOSE SERPL-MCNC: 85 MG/DL (ref 65–99)
HCT VFR BLD AUTO: 44.1 % (ref 35–49)
HGB BLD-MCNC: 14.5 G/DL (ref 12–15)
LYMPHOCYTES # BLD AUTO: 0.5 10*3/UL (ref 0.8–4.8)
LYMPHOCYTES NFR BLD AUTO: 9 % (ref 18–42)
MAGNESIUM SERPL-MCNC: 1.7 MG/DL (ref 1.8–2.5)
MCH RBC QN AUTO: 32 PG (ref 26–32)
MCHC RBC AUTO-ENTMCNC: 32.8 G/DL (ref 32–36)
MCV RBC AUTO: 97.5 FL (ref 80–94)
METAMYELOCYTES NFR BLD: 10 %
MONOCYTES # BLD AUTO: 0.3 10*3/UL (ref 0.1–1.3)
MONOCYTES NFR BLD AUTO: 5 % (ref 2–11)
MYELOCYTES NFR BLD MANUAL: 1 %
NEUTROPHILS # BLD AUTO: 1.3 10*3/UL (ref 2.3–8.6)
NEUTROPHILS NFR BLD AUTO: 22 % (ref 50–75)
NEUTS BAND NFR BLD MANUAL: 51 % (ref 0–5)
PATHOLOGIST REVIEW: (no result)
PHOSPHATE SERPL-MCNC: 2.7 MG/DL (ref 2.4–4.7)
PLATELET # BLD AUTO: 108 10*3/UL (ref 150–450)
PLT COMMENT: (no result)
PMV BLD AUTO: 7.9 FL (ref 7.4–10.4)
POTASSIUM SERPL-SCNC: 3.8 MMOL/L (ref 3.6–5.1)
RBC # BLD AUTO: 4.53 10*6/UL (ref 4–5.4)
SODIUM SERPL-SCNC: 141 MMOL/L (ref 136–144)
TRIGL SERPL-MCNC: 145 MG/DL
WBC # BLD AUTO: 6.1 10*3/UL (ref 4.5–11.5)

## 2017-10-30 ENCOUNTER — HOSPITAL ENCOUNTER (OUTPATIENT)
Dept: LAB | Facility: HOSPITAL | Age: 72
Discharge: HOME | End: 2017-10-30
Attending: TRANSPLANT SURGERY | Admitting: TRANSPLANT SURGERY

## 2017-10-30 LAB
ALBUMIN SERPL-MCNC: 3.8 G/DL (ref 3.5–4.8)
ALBUMIN/GLOB SERPL: 2.2 {RATIO} (ref 1–1.7)
ALP SERPL-CCNC: 92 IU/L (ref 32–91)
ALT SERPL-CCNC: 17 IU/L (ref 14–54)
ANION GAP SERPL CALC-SCNC: 10 MMOL/L (ref 10–20)
AST SERPL-CCNC: 21 IU/L (ref 15–41)
BASOPHILS # BLD AUTO: 0 10*3/UL (ref 0–0.2)
BASOPHILS NFR BLD AUTO: 1 % (ref 0–2)
BILIRUB SERPL-MCNC: 0.6 MG/DL (ref 0.3–1.2)
BUN SERPL-MCNC: 15 MG/DL (ref 8–20)
BUN/CREAT SERPL: 16.7 (ref 5.4–26.2)
CALCIUM SERPL-MCNC: 10.1 MG/DL (ref 8.9–10.3)
CHLORIDE SERPL-SCNC: 110 MMOL/L (ref 101–111)
CHOLEST SERPL-MCNC: 172 MG/DL
CONV CO2: 26 MMOL/L (ref 22–32)
CONV HIV-1/ HIV-2: NORMAL
CONV HIV-1/ HIV-2: NORMAL
CONV TOTAL PROTEIN: 5.5 G/DL (ref 6.1–7.9)
CREAT UR-MCNC: 0.9 MG/DL (ref 0.4–1)
DIFFERENTIAL METHOD BLD: (no result)
EOSINOPHIL # BLD AUTO: 0.1 10*3/UL (ref 0–0.3)
EOSINOPHIL # BLD AUTO: 1 % (ref 0–3)
ERYTHROCYTE [DISTWIDTH] IN BLOOD BY AUTOMATED COUNT: 14.6 % (ref 11.5–14.5)
GLOBULIN UR ELPH-MCNC: 1.7 G/DL (ref 2.5–3.8)
GLUCOSE SERPL-MCNC: 81 MG/DL (ref 65–99)
HCT VFR BLD AUTO: 44.3 % (ref 35–49)
HGB BLD-MCNC: 14.3 G/DL (ref 12–15)
LYMPHOCYTES # BLD AUTO: 0.6 10*3/UL (ref 0.8–4.8)
LYMPHOCYTES NFR BLD AUTO: 11 % (ref 18–42)
MAGNESIUM SERPL-MCNC: 1.7 MG/DL (ref 1.8–2.5)
MCH RBC QN AUTO: 31.7 PG (ref 26–32)
MCHC RBC AUTO-ENTMCNC: 32.2 G/DL (ref 32–36)
MCV RBC AUTO: 98.7 FL (ref 80–94)
MONOCYTES # BLD AUTO: 0.4 10*3/UL (ref 0.1–1.3)
MONOCYTES NFR BLD AUTO: 7 % (ref 2–11)
NEUTROPHILS # BLD AUTO: 4 10*3/UL (ref 2.3–8.6)
NEUTROPHILS NFR BLD AUTO: 80 % (ref 50–75)
NRBC BLD AUTO-RTO: 0 /100{WBCS}
NRBC/RBC NFR BLD MANUAL: 0 10*3/UL
PHOSPHATE SERPL-MCNC: 3 MG/DL (ref 2.4–4.7)
PLATELET # BLD AUTO: 93 10*3/UL (ref 150–450)
PMV BLD AUTO: 8.4 FL (ref 7.4–10.4)
POTASSIUM SERPL-SCNC: 4 MMOL/L (ref 3.6–5.1)
RBC # BLD AUTO: 4.49 10*6/UL (ref 4–5.4)
SODIUM SERPL-SCNC: 142 MMOL/L (ref 136–144)
TRIGL SERPL-MCNC: 185 MG/DL
WBC # BLD AUTO: 5.1 10*3/UL (ref 4.5–11.5)

## 2017-11-06 ENCOUNTER — HOSPITAL ENCOUNTER (OUTPATIENT)
Dept: LAB | Facility: HOSPITAL | Age: 72
Setting detail: SPECIMEN
Discharge: HOME | End: 2017-11-06
Attending: TRANSPLANT SURGERY | Admitting: TRANSPLANT SURGERY

## 2017-11-06 LAB
ABS VARIANT LYMPHS: 0.09 10*3/UL
ALBUMIN SERPL-MCNC: 3.7 G/DL (ref 3.5–4.8)
ALBUMIN/GLOB SERPL: 2.1 {RATIO} (ref 1–1.7)
ALP SERPL-CCNC: 92 IU/L (ref 32–91)
ALT SERPL-CCNC: 19 IU/L (ref 14–54)
ANION GAP SERPL CALC-SCNC: 11.3 MMOL/L (ref 10–20)
AST SERPL-CCNC: 25 IU/L (ref 15–41)
BILIRUB SERPL-MCNC: 0.8 MG/DL (ref 0.3–1.2)
BUN SERPL-MCNC: 10 MG/DL (ref 8–20)
BUN/CREAT SERPL: 11.1 (ref 5.4–26.2)
CALCIUM SERPL-MCNC: 10 MG/DL (ref 8.9–10.3)
CHLORIDE SERPL-SCNC: 110 MMOL/L (ref 101–111)
CHOLEST SERPL-MCNC: 173 MG/DL
CONV ABS BANDS: 1.3 10*3/UL
CONV ABS IMM GRAN: 0.13 10*3/UL
CONV CO2: 24 MMOL/L (ref 22–32)
CONV TOTAL PROTEIN: 5.5 G/DL (ref 6.1–7.9)
CONV VARIANT LYMPHOCYTES RELATIVE PERCENT BY MANUAL COUNT: 2 % (ref 0–1)
CREAT UR-MCNC: 0.9 MG/DL (ref 0.4–1)
DIFFERENTIAL METHOD BLD: (no result)
EOSINOPHIL # BLD AUTO: 0.1 10*3/UL (ref 0–0.3)
EOSINOPHIL # BLD AUTO: 2 % (ref 0–3)
ERYTHROCYTE [DISTWIDTH] IN BLOOD BY AUTOMATED COUNT: 14.6 % (ref 11.5–14.5)
GLOBULIN UR ELPH-MCNC: 1.8 G/DL (ref 2.5–3.8)
GLUCOSE SERPL-MCNC: 74 MG/DL (ref 65–99)
HCT VFR BLD AUTO: 44.4 % (ref 35–49)
HGB BLD-MCNC: 14.1 G/DL (ref 12–15)
LYMPHOCYTES # BLD AUTO: 1.2 10*3/UL (ref 0.8–4.8)
LYMPHOCYTES NFR BLD AUTO: 27 % (ref 18–42)
MAGNESIUM SERPL-MCNC: 1.8 MG/DL (ref 1.8–2.5)
MCH RBC QN AUTO: 31.2 PG (ref 26–32)
MCHC RBC AUTO-ENTMCNC: 31.9 G/DL (ref 32–36)
MCV RBC AUTO: 97.8 FL (ref 80–94)
METAMYELOCYTES NFR BLD: 3 %
MONOCYTES # BLD AUTO: 0.4 10*3/UL (ref 0.1–1.3)
MONOCYTES NFR BLD AUTO: 8 % (ref 2–11)
NEUTROPHILS # BLD AUTO: 1.2 10*3/UL (ref 2.3–8.6)
NEUTROPHILS NFR BLD AUTO: 27 % (ref 50–75)
NEUTS BAND NFR BLD MANUAL: 31 % (ref 0–5)
PATHOLOGIST REVIEW: (no result)
PHOSPHATE SERPL-MCNC: 2.7 MG/DL (ref 2.4–4.7)
PLATELET # BLD AUTO: 105 10*3/UL (ref 150–450)
PLT COMMENT: (no result)
PMV BLD AUTO: 7.9 FL (ref 7.4–10.4)
POTASSIUM SERPL-SCNC: 4.3 MMOL/L (ref 3.6–5.1)
RBC # BLD AUTO: 4.54 10*6/UL (ref 4–5.4)
SODIUM SERPL-SCNC: 141 MMOL/L (ref 136–144)
TRIGL SERPL-MCNC: 125 MG/DL
WBC # BLD AUTO: 4.4 10*3/UL (ref 4.5–11.5)

## 2017-11-07 ENCOUNTER — HOSPITAL ENCOUNTER (OUTPATIENT)
Dept: ULTRASOUND IMAGING | Facility: HOSPITAL | Age: 72
Discharge: HOME | End: 2017-11-07
Attending: INTERNAL MEDICINE | Admitting: INTERNAL MEDICINE

## 2017-11-13 ENCOUNTER — HOSPITAL ENCOUNTER (OUTPATIENT)
Dept: LAB | Facility: HOSPITAL | Age: 72
Setting detail: SPECIMEN
Discharge: HOME | End: 2017-11-13
Attending: TRANSPLANT SURGERY | Admitting: TRANSPLANT SURGERY

## 2017-11-13 LAB
ABS VARIANT LYMPHS: 0.38 10*3/UL
ALBUMIN SERPL-MCNC: 3.7 G/DL (ref 3.5–4.8)
ALBUMIN/GLOB SERPL: 2.1 {RATIO} (ref 1–1.7)
ALP SERPL-CCNC: 91 IU/L (ref 32–91)
ALT SERPL-CCNC: 16 IU/L (ref 14–54)
ANION GAP SERPL CALC-SCNC: 9.5 MMOL/L (ref 10–20)
AST SERPL-CCNC: 23 IU/L (ref 15–41)
BASOPHILS # BLD AUTO: 0.1 10*3/UL (ref 0–0.2)
BASOPHILS NFR BLD AUTO: 2 % (ref 0–2)
BILIRUB SERPL-MCNC: 0.6 MG/DL (ref 0.3–1.2)
BUN SERPL-MCNC: 12 MG/DL (ref 8–20)
BUN/CREAT SERPL: 13.3 (ref 5.4–26.2)
CALCIUM SERPL-MCNC: 9.9 MG/DL (ref 8.9–10.3)
CHLORIDE SERPL-SCNC: 110 MMOL/L (ref 101–111)
CHOLEST SERPL-MCNC: 172 MG/DL
CONV ABS BANDS: 1.4 10*3/UL
CONV ABS IMM GRAN: 0.05 10*3/UL
CONV CO2: 25 MMOL/L (ref 22–32)
CONV TOTAL PROTEIN: 5.5 G/DL (ref 6.1–7.9)
CONV VARIANT LYMPHOCYTES RELATIVE PERCENT BY MANUAL COUNT: 7 % (ref 0–1)
CREAT UR-MCNC: 0.9 MG/DL (ref 0.4–1)
DIFFERENTIAL METHOD BLD: (no result)
EOSINOPHIL # BLD AUTO: 0.1 10*3/UL (ref 0–0.3)
EOSINOPHIL # BLD AUTO: 1 % (ref 0–3)
ERYTHROCYTE [DISTWIDTH] IN BLOOD BY AUTOMATED COUNT: 14.2 % (ref 11.5–14.5)
GLOBULIN UR ELPH-MCNC: 1.8 G/DL (ref 2.5–3.8)
GLUCOSE SERPL-MCNC: 76 MG/DL (ref 65–99)
HCT VFR BLD AUTO: 44 % (ref 35–49)
HGB BLD-MCNC: 14.1 G/DL (ref 12–15)
LYMPHOCYTES # BLD AUTO: 1 10*3/UL (ref 0.8–4.8)
LYMPHOCYTES NFR BLD AUTO: 19 % (ref 18–42)
MAGNESIUM SERPL-MCNC: 1.8 MG/DL (ref 1.8–2.5)
MCH RBC QN AUTO: 31 PG (ref 26–32)
MCHC RBC AUTO-ENTMCNC: 31.9 G/DL (ref 32–36)
MCV RBC AUTO: 97 FL (ref 80–94)
METAMYELOCYTES NFR BLD: 1 %
MONOCYTES # BLD AUTO: 0.9 10*3/UL (ref 0.1–1.3)
MONOCYTES NFR BLD AUTO: 16 % (ref 2–11)
NEUTROPHILS # BLD AUTO: 1.5 10*3/UL (ref 2.3–8.6)
NEUTROPHILS NFR BLD AUTO: 28 % (ref 50–75)
NEUTS BAND NFR BLD MANUAL: 26 % (ref 0–5)
PATHOLOGIST REVIEW: (no result)
PHOSPHATE SERPL-MCNC: 2.9 MG/DL (ref 2.4–4.7)
PLATELET # BLD AUTO: 117 10*3/UL (ref 150–450)
PMV BLD AUTO: 7.4 FL (ref 7.4–10.4)
POTASSIUM SERPL-SCNC: 4.5 MMOL/L (ref 3.6–5.1)
RBC # BLD AUTO: 4.54 10*6/UL (ref 4–5.4)
SODIUM SERPL-SCNC: 140 MMOL/L (ref 136–144)
TRIGL SERPL-MCNC: 105 MG/DL
WBC # BLD AUTO: 5.4 10*3/UL (ref 4.5–11.5)

## 2017-11-20 ENCOUNTER — HOSPITAL ENCOUNTER (OUTPATIENT)
Dept: LAB | Facility: HOSPITAL | Age: 72
Setting detail: SPECIMEN
Discharge: HOME | End: 2017-11-20
Attending: TRANSPLANT SURGERY | Admitting: TRANSPLANT SURGERY

## 2017-11-20 LAB
ABS VARIANT LYMPHS: 0.43 10*3/UL
ALBUMIN SERPL-MCNC: 4.1 G/DL (ref 3.5–4.8)
ALBUMIN/GLOB SERPL: 2.3 {RATIO} (ref 1–1.7)
ALP SERPL-CCNC: 95 IU/L (ref 32–91)
ALT SERPL-CCNC: 17 IU/L (ref 14–54)
ANION GAP SERPL CALC-SCNC: 10 MMOL/L (ref 10–20)
AST SERPL-CCNC: 20 IU/L (ref 15–41)
BILIRUB SERPL-MCNC: 1.1 MG/DL (ref 0.3–1.2)
BUN SERPL-MCNC: 12 MG/DL (ref 8–20)
BUN/CREAT SERPL: 15 (ref 5.4–26.2)
CALCIUM SERPL-MCNC: 10.1 MG/DL (ref 8.9–10.3)
CHLORIDE SERPL-SCNC: 109 MMOL/L (ref 101–111)
CHOLEST SERPL-MCNC: 180 MG/DL
CONV ABS BANDS: 0.7 10*3/UL
CONV CO2: 25 MMOL/L (ref 22–32)
CONV TOTAL PROTEIN: 5.9 G/DL (ref 6.1–7.9)
CONV VARIANT LYMPHOCYTES RELATIVE PERCENT BY MANUAL COUNT: 10 % (ref 0–1)
CREAT UR-MCNC: 0.8 MG/DL (ref 0.4–1)
DIFFERENTIAL METHOD BLD: (no result)
ERYTHROCYTE [DISTWIDTH] IN BLOOD BY AUTOMATED COUNT: 14.4 % (ref 11.5–14.5)
GLOBULIN UR ELPH-MCNC: 1.8 G/DL (ref 2.5–3.8)
GLUCOSE SERPL-MCNC: 80 MG/DL (ref 65–99)
HCT VFR BLD AUTO: 45.4 % (ref 35–49)
HGB BLD-MCNC: 14.4 G/DL (ref 12–15)
LYMPHOCYTES # BLD AUTO: 1.3 10*3/UL (ref 0.8–4.8)
LYMPHOCYTES NFR BLD AUTO: 31 % (ref 18–42)
MAGNESIUM SERPL-MCNC: 1.8 MG/DL (ref 1.8–2.5)
MCH RBC QN AUTO: 30.9 PG (ref 26–32)
MCHC RBC AUTO-ENTMCNC: 31.8 G/DL (ref 32–36)
MCV RBC AUTO: 97.3 FL (ref 80–94)
MONOCYTES # BLD AUTO: 0.4 10*3/UL (ref 0.1–1.3)
MONOCYTES NFR BLD AUTO: 9 % (ref 2–11)
NEUTROPHILS # BLD AUTO: 1.5 10*3/UL (ref 2.3–8.6)
NEUTROPHILS NFR BLD AUTO: 33 % (ref 50–75)
NEUTS BAND NFR BLD MANUAL: 17 % (ref 0–5)
PATHOLOGIST REVIEW: (no result)
PHOSPHATE SERPL-MCNC: 3 MG/DL (ref 2.4–4.7)
PLATELET # BLD AUTO: 125 10*3/UL (ref 150–450)
PLT COMMENT: (no result)
PMV BLD AUTO: 7.2 FL (ref 7.4–10.4)
POTASSIUM SERPL-SCNC: 4 MMOL/L (ref 3.6–5.1)
RBC # BLD AUTO: 4.67 10*6/UL (ref 4–5.4)
SODIUM SERPL-SCNC: 140 MMOL/L (ref 136–144)
TRIGL SERPL-MCNC: 101 MG/DL
WBC # BLD AUTO: 4.3 10*3/UL (ref 4.5–11.5)

## 2017-11-27 ENCOUNTER — HOSPITAL ENCOUNTER (OUTPATIENT)
Dept: LAB | Facility: HOSPITAL | Age: 72
Setting detail: SPECIMEN
Discharge: HOME | End: 2017-11-27
Attending: TRANSPLANT SURGERY | Admitting: TRANSPLANT SURGERY

## 2017-11-27 LAB
ALBUMIN SERPL-MCNC: 4 G/DL (ref 3.5–4.8)
ALBUMIN/GLOB SERPL: 2.2 {RATIO} (ref 1–1.7)
ALP SERPL-CCNC: 90 IU/L (ref 32–91)
ALT SERPL-CCNC: 17 IU/L (ref 14–54)
ANION GAP SERPL CALC-SCNC: 11.5 MMOL/L (ref 10–20)
AST SERPL-CCNC: 20 IU/L (ref 15–41)
BASOPHILS # BLD AUTO: 0.1 10*3/UL (ref 0–0.2)
BASOPHILS NFR BLD AUTO: 2 % (ref 0–2)
BILIRUB SERPL-MCNC: 0.8 MG/DL (ref 0.3–1.2)
BUN SERPL-MCNC: 14 MG/DL (ref 8–20)
BUN/CREAT SERPL: 15.6 (ref 5.4–26.2)
CALCIUM SERPL-MCNC: 10.2 MG/DL (ref 8.9–10.3)
CHLORIDE SERPL-SCNC: 107 MMOL/L (ref 101–111)
CHOLEST SERPL-MCNC: 170 MG/DL
CONV CO2: 25 MMOL/L (ref 22–32)
CONV TOTAL PROTEIN: 5.8 G/DL (ref 6.1–7.9)
CREAT UR-MCNC: 0.9 MG/DL (ref 0.4–1)
DIFFERENTIAL METHOD BLD: (no result)
EOSINOPHIL # BLD AUTO: 0.1 10*3/UL (ref 0–0.3)
EOSINOPHIL # BLD AUTO: 2 % (ref 0–3)
ERYTHROCYTE [DISTWIDTH] IN BLOOD BY AUTOMATED COUNT: 14.5 % (ref 11.5–14.5)
GLOBULIN UR ELPH-MCNC: 1.8 G/DL (ref 2.5–3.8)
GLUCOSE SERPL-MCNC: 76 MG/DL (ref 65–99)
HCT VFR BLD AUTO: 46.2 % (ref 35–49)
HGB BLD-MCNC: 14.9 G/DL (ref 12–15)
LYMPHOCYTES # BLD AUTO: 1.3 10*3/UL (ref 0.8–4.8)
LYMPHOCYTES NFR BLD AUTO: 35 % (ref 18–42)
MAGNESIUM SERPL-MCNC: 1.8 MG/DL (ref 1.8–2.5)
MCH RBC QN AUTO: 31.5 PG (ref 26–32)
MCHC RBC AUTO-ENTMCNC: 32.2 G/DL (ref 32–36)
MCV RBC AUTO: 98 FL (ref 80–94)
MONOCYTES # BLD AUTO: 0.6 10*3/UL (ref 0.1–1.3)
MONOCYTES NFR BLD AUTO: 15 % (ref 2–11)
NEUTROPHILS # BLD AUTO: 1.7 10*3/UL (ref 2.3–8.6)
NEUTROPHILS NFR BLD AUTO: 46 % (ref 50–75)
NRBC BLD AUTO-RTO: 0 /100{WBCS}
NRBC/RBC NFR BLD MANUAL: 0 10*3/UL
PHOSPHATE SERPL-MCNC: 3.3 MG/DL (ref 2.4–4.7)
PLATELET # BLD AUTO: 123 10*3/UL (ref 150–450)
PMV BLD AUTO: 7.7 FL (ref 7.4–10.4)
POTASSIUM SERPL-SCNC: 4.5 MMOL/L (ref 3.6–5.1)
RBC # BLD AUTO: 4.72 10*6/UL (ref 4–5.4)
SODIUM SERPL-SCNC: 139 MMOL/L (ref 136–144)
TRIGL SERPL-MCNC: 130 MG/DL
WBC # BLD AUTO: 3.8 10*3/UL (ref 4.5–11.5)

## 2017-12-06 ENCOUNTER — HOSPITAL ENCOUNTER (OUTPATIENT)
Dept: LAB | Facility: HOSPITAL | Age: 72
Setting detail: SPECIMEN
Discharge: HOME | End: 2017-12-06
Attending: TRANSPLANT SURGERY | Admitting: TRANSPLANT SURGERY

## 2017-12-06 LAB
ALBUMIN SERPL-MCNC: 3.8 G/DL (ref 3.5–4.8)
ALBUMIN/GLOB SERPL: 1.9 {RATIO} (ref 1–1.7)
ALP SERPL-CCNC: 84 IU/L (ref 32–91)
ALT SERPL-CCNC: 17 IU/L (ref 14–54)
ANION GAP SERPL CALC-SCNC: 9.3 MMOL/L (ref 10–20)
AST SERPL-CCNC: 16 IU/L (ref 15–41)
BASOPHILS # BLD AUTO: 0.1 10*3/UL (ref 0–0.2)
BASOPHILS NFR BLD AUTO: 1 % (ref 0–2)
BILIRUB SERPL-MCNC: 0.9 MG/DL (ref 0.3–1.2)
BUN SERPL-MCNC: 13 MG/DL (ref 8–20)
BUN/CREAT SERPL: 16.3 (ref 5.4–26.2)
CALCIUM SERPL-MCNC: 10 MG/DL (ref 8.9–10.3)
CHLORIDE SERPL-SCNC: 111 MMOL/L (ref 101–111)
CHOLEST SERPL-MCNC: 151 MG/DL
CONV CO2: 25 MMOL/L (ref 22–32)
CONV TOTAL PROTEIN: 5.8 G/DL (ref 6.1–7.9)
CREAT UR-MCNC: 0.8 MG/DL (ref 0.4–1)
DIFFERENTIAL METHOD BLD: (no result)
EOSINOPHIL # BLD AUTO: 0.1 10*3/UL (ref 0–0.3)
EOSINOPHIL # BLD AUTO: 2 % (ref 0–3)
ERYTHROCYTE [DISTWIDTH] IN BLOOD BY AUTOMATED COUNT: 14 % (ref 11.5–14.5)
GLOBULIN UR ELPH-MCNC: 2 G/DL (ref 2.5–3.8)
GLUCOSE SERPL-MCNC: 69 MG/DL (ref 65–99)
HCT VFR BLD AUTO: 43.8 % (ref 35–49)
HGB BLD-MCNC: 14 G/DL (ref 12–15)
LYMPHOCYTES # BLD AUTO: 1.3 10*3/UL (ref 0.8–4.8)
LYMPHOCYTES NFR BLD AUTO: 27 % (ref 18–42)
MAGNESIUM SERPL-MCNC: 1.7 MG/DL (ref 1.8–2.5)
MCH RBC QN AUTO: 30.9 PG (ref 26–32)
MCHC RBC AUTO-ENTMCNC: 32 G/DL (ref 32–36)
MCV RBC AUTO: 96.7 FL (ref 80–94)
MONOCYTES # BLD AUTO: 0.6 10*3/UL (ref 0.1–1.3)
MONOCYTES NFR BLD AUTO: 13 % (ref 2–11)
NEUTROPHILS # BLD AUTO: 2.7 10*3/UL (ref 2.3–8.6)
NEUTROPHILS NFR BLD AUTO: 57 % (ref 50–75)
NRBC BLD AUTO-RTO: 0 /100{WBCS}
NRBC/RBC NFR BLD MANUAL: 0 10*3/UL
PHOSPHATE SERPL-MCNC: 3.2 MG/DL (ref 2.4–4.7)
PLATELET # BLD AUTO: 113 10*3/UL (ref 150–450)
PMV BLD AUTO: 7.4 FL (ref 7.4–10.4)
POTASSIUM SERPL-SCNC: 4.3 MMOL/L (ref 3.6–5.1)
RBC # BLD AUTO: 4.53 10*6/UL (ref 4–5.4)
SODIUM SERPL-SCNC: 141 MMOL/L (ref 136–144)
TRIGL SERPL-MCNC: 91 MG/DL
WBC # BLD AUTO: 4.7 10*3/UL (ref 4.5–11.5)

## 2017-12-11 ENCOUNTER — HOSPITAL ENCOUNTER (OUTPATIENT)
Dept: LAB | Facility: HOSPITAL | Age: 72
Setting detail: SPECIMEN
Discharge: HOME | End: 2017-12-11
Attending: TRANSPLANT SURGERY | Admitting: TRANSPLANT SURGERY

## 2017-12-11 LAB
ALBUMIN SERPL-MCNC: 3.9 G/DL (ref 3.5–4.8)
ALBUMIN/GLOB SERPL: 2.6 {RATIO} (ref 1–1.7)
ALP SERPL-CCNC: 75 IU/L (ref 32–91)
ALT SERPL-CCNC: 16 IU/L (ref 14–54)
ANION GAP SERPL CALC-SCNC: 10.6 MMOL/L (ref 10–20)
AST SERPL-CCNC: 19 IU/L (ref 15–41)
BASOPHILS # BLD AUTO: 0.1 10*3/UL (ref 0–0.2)
BASOPHILS NFR BLD AUTO: 2 % (ref 0–2)
BILIRUB SERPL-MCNC: 0.6 MG/DL (ref 0.3–1.2)
BUN SERPL-MCNC: 14 MG/DL (ref 8–20)
BUN/CREAT SERPL: 15.6 (ref 5.4–26.2)
CALCIUM SERPL-MCNC: 10.1 MG/DL (ref 8.9–10.3)
CHLORIDE SERPL-SCNC: 108 MMOL/L (ref 101–111)
CHOLEST SERPL-MCNC: 156 MG/DL
CONV CO2: 27 MMOL/L (ref 22–32)
CONV TOTAL PROTEIN: 5.4 G/DL (ref 6.1–7.9)
CREAT UR-MCNC: 0.9 MG/DL (ref 0.4–1)
DIFFERENTIAL METHOD BLD: (no result)
EOSINOPHIL # BLD AUTO: 0.1 10*3/UL (ref 0–0.3)
EOSINOPHIL # BLD AUTO: 2 % (ref 0–3)
ERYTHROCYTE [DISTWIDTH] IN BLOOD BY AUTOMATED COUNT: 14.4 % (ref 11.5–14.5)
GLOBULIN UR ELPH-MCNC: 1.5 G/DL (ref 2.5–3.8)
GLUCOSE SERPL-MCNC: 81 MG/DL (ref 65–99)
HCT VFR BLD AUTO: 43.7 % (ref 35–49)
HGB BLD-MCNC: 14 G/DL (ref 12–15)
LYMPHOCYTES # BLD AUTO: 1.1 10*3/UL (ref 0.8–4.8)
LYMPHOCYTES NFR BLD AUTO: 30 % (ref 18–42)
MAGNESIUM SERPL-MCNC: 1.8 MG/DL (ref 1.8–2.5)
MCH RBC QN AUTO: 31.2 PG (ref 26–32)
MCHC RBC AUTO-ENTMCNC: 32 G/DL (ref 32–36)
MCV RBC AUTO: 97.4 FL (ref 80–94)
MONOCYTES # BLD AUTO: 0.5 10*3/UL (ref 0.1–1.3)
MONOCYTES NFR BLD AUTO: 14 % (ref 2–11)
NEUTROPHILS # BLD AUTO: 1.9 10*3/UL (ref 2.3–8.6)
NEUTROPHILS NFR BLD AUTO: 52 % (ref 50–75)
NRBC BLD AUTO-RTO: 0 /100{WBCS}
NRBC/RBC NFR BLD MANUAL: 0 10*3/UL
PHOSPHATE SERPL-MCNC: 3.3 MG/DL (ref 2.4–4.7)
PLATELET # BLD AUTO: 121 10*3/UL (ref 150–450)
PMV BLD AUTO: 8 FL (ref 7.4–10.4)
POTASSIUM SERPL-SCNC: 4.6 MMOL/L (ref 3.6–5.1)
RBC # BLD AUTO: 4.49 10*6/UL (ref 4–5.4)
SODIUM SERPL-SCNC: 141 MMOL/L (ref 136–144)
TRIGL SERPL-MCNC: 81 MG/DL
WBC # BLD AUTO: 3.6 10*3/UL (ref 4.5–11.5)

## 2017-12-18 ENCOUNTER — HOSPITAL ENCOUNTER (OUTPATIENT)
Dept: LAB | Facility: HOSPITAL | Age: 72
Setting detail: SPECIMEN
Discharge: HOME | End: 2017-12-18
Attending: TRANSPLANT SURGERY | Admitting: TRANSPLANT SURGERY

## 2017-12-18 LAB
ALBUMIN SERPL-MCNC: 3.7 G/DL (ref 3.5–4.8)
ALBUMIN/GLOB SERPL: 2.1 {RATIO} (ref 1–1.7)
ALP SERPL-CCNC: 78 IU/L (ref 32–91)
ALT SERPL-CCNC: 17 IU/L (ref 14–54)
ANION GAP SERPL CALC-SCNC: 9.6 MMOL/L (ref 10–20)
AST SERPL-CCNC: 17 IU/L (ref 15–41)
BASOPHILS # BLD AUTO: 0.1 10*3/UL (ref 0–0.2)
BASOPHILS NFR BLD AUTO: 3 % (ref 0–2)
BILIRUB SERPL-MCNC: 0.5 MG/DL (ref 0.3–1.2)
BUN SERPL-MCNC: 15 MG/DL (ref 8–20)
BUN/CREAT SERPL: 18.8 (ref 5.4–26.2)
CALCIUM SERPL-MCNC: 10 MG/DL (ref 8.9–10.3)
CHLORIDE SERPL-SCNC: 112 MMOL/L (ref 101–111)
CHOLEST SERPL-MCNC: 145 MG/DL
CONV CO2: 25 MMOL/L (ref 22–32)
CONV TOTAL PROTEIN: 5.5 G/DL (ref 6.1–7.9)
CREAT UR-MCNC: 0.8 MG/DL (ref 0.4–1)
DIFFERENTIAL METHOD BLD: (no result)
EOSINOPHIL # BLD AUTO: 0.1 10*3/UL (ref 0–0.3)
EOSINOPHIL # BLD AUTO: 2 % (ref 0–3)
ERYTHROCYTE [DISTWIDTH] IN BLOOD BY AUTOMATED COUNT: 13.7 % (ref 11.5–14.5)
GLOBULIN UR ELPH-MCNC: 1.8 G/DL (ref 2.5–3.8)
GLUCOSE SERPL-MCNC: 72 MG/DL (ref 65–99)
HCT VFR BLD AUTO: 37.4 % (ref 35–49)
HGB BLD-MCNC: 12.3 G/DL (ref 12–15)
LYMPHOCYTES # BLD AUTO: 1 10*3/UL (ref 0.8–4.8)
LYMPHOCYTES NFR BLD AUTO: 26 % (ref 18–42)
MAGNESIUM SERPL-MCNC: 1.6 MG/DL (ref 1.8–2.5)
MCH RBC QN AUTO: 31.7 PG (ref 26–32)
MCHC RBC AUTO-ENTMCNC: 32.8 G/DL (ref 32–36)
MCV RBC AUTO: 96.8 FL (ref 80–94)
MONOCYTES # BLD AUTO: 0.5 10*3/UL (ref 0.1–1.3)
MONOCYTES NFR BLD AUTO: 13 % (ref 2–11)
NEUTROPHILS # BLD AUTO: 2.1 10*3/UL (ref 2.3–8.6)
NEUTROPHILS NFR BLD AUTO: 56 % (ref 50–75)
NRBC BLD AUTO-RTO: 0 /100{WBCS}
NRBC/RBC NFR BLD MANUAL: 0 10*3/UL
PHOSPHATE SERPL-MCNC: 3.8 MG/DL (ref 2.4–4.7)
PLATELET # BLD AUTO: 102 10*3/UL (ref 150–450)
PMV BLD AUTO: 7.5 FL (ref 7.4–10.4)
POTASSIUM SERPL-SCNC: 4.6 MMOL/L (ref 3.6–5.1)
RBC # BLD AUTO: 3.87 10*6/UL (ref 4–5.4)
SODIUM SERPL-SCNC: 142 MMOL/L (ref 136–144)
TRIGL SERPL-MCNC: 151 MG/DL
WBC # BLD AUTO: 3.8 10*3/UL (ref 4.5–11.5)

## 2018-01-02 ENCOUNTER — HOSPITAL ENCOUNTER (OUTPATIENT)
Dept: LAB | Facility: HOSPITAL | Age: 73
Setting detail: SPECIMEN
Discharge: HOME | End: 2018-01-02
Attending: TRANSPLANT SURGERY | Admitting: TRANSPLANT SURGERY

## 2018-01-02 LAB
ALBUMIN SERPL-MCNC: 3.7 G/DL (ref 3.5–4.8)
ALBUMIN/GLOB SERPL: 1.9 {RATIO} (ref 1–1.7)
ALP SERPL-CCNC: 70 IU/L (ref 32–91)
ALT SERPL-CCNC: 18 IU/L (ref 14–54)
ANION GAP SERPL CALC-SCNC: 8.6 MMOL/L (ref 10–20)
AST SERPL-CCNC: 16 IU/L (ref 15–41)
BASOPHILS # BLD AUTO: 0.1 10*3/UL (ref 0–0.2)
BASOPHILS NFR BLD AUTO: 2 % (ref 0–2)
BILIRUB SERPL-MCNC: 0.6 MG/DL (ref 0.3–1.2)
BUN SERPL-MCNC: 17 MG/DL (ref 8–20)
BUN/CREAT SERPL: 18.9 (ref 5.4–26.2)
CALCIUM SERPL-MCNC: 10.1 MG/DL (ref 8.9–10.3)
CHLORIDE SERPL-SCNC: 112 MMOL/L (ref 101–111)
CHOLEST SERPL-MCNC: 163 MG/DL
CONV CO2: 24 MMOL/L (ref 22–32)
CONV TOTAL PROTEIN: 5.6 G/DL (ref 6.1–7.9)
CREAT UR-MCNC: 0.9 MG/DL (ref 0.4–1)
DIFFERENTIAL METHOD BLD: (no result)
EOSINOPHIL # BLD AUTO: 0.1 10*3/UL (ref 0–0.3)
EOSINOPHIL # BLD AUTO: 2 % (ref 0–3)
ERYTHROCYTE [DISTWIDTH] IN BLOOD BY AUTOMATED COUNT: 14.1 % (ref 11.5–14.5)
GLOBULIN UR ELPH-MCNC: 1.9 G/DL (ref 2.5–3.8)
GLUCOSE SERPL-MCNC: 73 MG/DL (ref 65–99)
HCT VFR BLD AUTO: 41.2 % (ref 35–49)
HGB BLD-MCNC: 13.2 G/DL (ref 12–15)
LYMPHOCYTES # BLD AUTO: 1.1 10*3/UL (ref 0.8–4.8)
LYMPHOCYTES NFR BLD AUTO: 28 % (ref 18–42)
MAGNESIUM SERPL-MCNC: 1.8 MG/DL (ref 1.8–2.5)
MCH RBC QN AUTO: 31.4 PG (ref 26–32)
MCHC RBC AUTO-ENTMCNC: 32 G/DL (ref 32–36)
MCV RBC AUTO: 98.3 FL (ref 80–94)
MONOCYTES # BLD AUTO: 0.6 10*3/UL (ref 0.1–1.3)
MONOCYTES NFR BLD AUTO: 14 % (ref 2–11)
NEUTROPHILS # BLD AUTO: 2.1 10*3/UL (ref 2.3–8.6)
NEUTROPHILS NFR BLD AUTO: 54 % (ref 50–75)
NRBC BLD AUTO-RTO: 0 /100{WBCS}
NRBC/RBC NFR BLD MANUAL: 0 10*3/UL
PHOSPHATE SERPL-MCNC: 3.4 MG/DL (ref 2.4–4.7)
PLATELET # BLD AUTO: 111 10*3/UL (ref 150–450)
PMV BLD AUTO: 8.2 FL (ref 7.4–10.4)
POTASSIUM SERPL-SCNC: 4.6 MMOL/L (ref 3.6–5.1)
RBC # BLD AUTO: 4.19 10*6/UL (ref 4–5.4)
SODIUM SERPL-SCNC: 140 MMOL/L (ref 136–144)
TRIGL SERPL-MCNC: 113 MG/DL
WBC # BLD AUTO: 3.9 10*3/UL (ref 4.5–11.5)

## 2018-01-18 ENCOUNTER — HOSPITAL ENCOUNTER (OUTPATIENT)
Dept: LAB | Facility: HOSPITAL | Age: 73
Setting detail: SPECIMEN
Discharge: HOME | End: 2018-01-18
Attending: TRANSPLANT SURGERY | Admitting: TRANSPLANT SURGERY

## 2018-01-18 LAB
ALBUMIN SERPL-MCNC: 4 G/DL (ref 3.5–4.8)
ALBUMIN/GLOB SERPL: 2.1 {RATIO} (ref 1–1.7)
ALP SERPL-CCNC: 72 IU/L (ref 32–91)
ALT SERPL-CCNC: 22 IU/L (ref 14–54)
ANION GAP SERPL CALC-SCNC: 11.1 MMOL/L (ref 10–20)
AST SERPL-CCNC: 21 IU/L (ref 15–41)
BASOPHILS # BLD AUTO: 0 10*3/UL (ref 0–0.2)
BASOPHILS NFR BLD AUTO: 1 % (ref 0–2)
BILIRUB SERPL-MCNC: 0.9 MG/DL (ref 0.3–1.2)
BUN SERPL-MCNC: 11 MG/DL (ref 8–20)
BUN/CREAT SERPL: 11 (ref 5.4–26.2)
CALCIUM SERPL-MCNC: 10.3 MG/DL (ref 8.9–10.3)
CHLORIDE SERPL-SCNC: 109 MMOL/L (ref 101–111)
CONV CO2: 25 MMOL/L (ref 22–32)
CONV TOTAL PROTEIN: 5.9 G/DL (ref 6.1–7.9)
CREAT UR-MCNC: 1 MG/DL (ref 0.4–1)
DIFFERENTIAL METHOD BLD: (no result)
EOSINOPHIL # BLD AUTO: 0 10*3/UL (ref 0–0.3)
EOSINOPHIL # BLD AUTO: 1 % (ref 0–3)
ERYTHROCYTE [DISTWIDTH] IN BLOOD BY AUTOMATED COUNT: 13.7 % (ref 11.5–14.5)
GLOBULIN UR ELPH-MCNC: 1.9 G/DL (ref 2.5–3.8)
GLUCOSE SERPL-MCNC: 70 MG/DL (ref 65–99)
HCT VFR BLD AUTO: 41.2 % (ref 35–49)
HGB BLD-MCNC: 13.6 G/DL (ref 12–15)
LYMPHOCYTES # BLD AUTO: 1.1 10*3/UL (ref 0.8–4.8)
LYMPHOCYTES NFR BLD AUTO: 29 % (ref 18–42)
MAGNESIUM SERPL-MCNC: 1.6 MG/DL (ref 1.8–2.5)
MCH RBC QN AUTO: 31.3 PG (ref 26–32)
MCHC RBC AUTO-ENTMCNC: 33 G/DL (ref 32–36)
MCV RBC AUTO: 94.8 FL (ref 80–94)
MONOCYTES # BLD AUTO: 0.5 10*3/UL (ref 0.1–1.3)
MONOCYTES NFR BLD AUTO: 14 % (ref 2–11)
NEUTROPHILS # BLD AUTO: 2.1 10*3/UL (ref 2.3–8.6)
NEUTROPHILS NFR BLD AUTO: 55 % (ref 50–75)
NRBC BLD AUTO-RTO: 0 /100{WBCS}
NRBC/RBC NFR BLD MANUAL: 0 10*3/UL
PHOSPHATE SERPL-MCNC: 3.3 MG/DL (ref 2.4–4.7)
PLATELET # BLD AUTO: 120 10*3/UL (ref 150–450)
PMV BLD AUTO: 7.5 FL (ref 7.4–10.4)
POTASSIUM SERPL-SCNC: 4.1 MMOL/L (ref 3.6–5.1)
RBC # BLD AUTO: 4.35 10*6/UL (ref 4–5.4)
SODIUM SERPL-SCNC: 141 MMOL/L (ref 136–144)
WBC # BLD AUTO: 3.9 10*3/UL (ref 4.5–11.5)

## 2018-01-29 ENCOUNTER — HOSPITAL ENCOUNTER (OUTPATIENT)
Dept: LAB | Facility: HOSPITAL | Age: 73
Setting detail: SPECIMEN
Discharge: HOME | End: 2018-01-29
Attending: TRANSPLANT SURGERY | Admitting: TRANSPLANT SURGERY

## 2018-01-29 LAB
ALBUMIN SERPL-MCNC: 4 G/DL (ref 3.5–4.8)
ALBUMIN/GLOB SERPL: 2.4 {RATIO} (ref 1–1.7)
ALP SERPL-CCNC: 64 IU/L (ref 32–91)
ALT SERPL-CCNC: 21 IU/L (ref 14–54)
ANION GAP SERPL CALC-SCNC: 9.5 MMOL/L (ref 10–20)
AST SERPL-CCNC: 21 IU/L (ref 15–41)
BASOPHILS # BLD AUTO: 0 10*3/UL (ref 0–0.2)
BASOPHILS NFR BLD AUTO: 1 % (ref 0–2)
BILIRUB SERPL-MCNC: 0.7 MG/DL (ref 0.3–1.2)
BUN SERPL-MCNC: 14 MG/DL (ref 8–20)
BUN/CREAT SERPL: 12.7 (ref 5.4–26.2)
CALCIUM SERPL-MCNC: 10.5 MG/DL (ref 8.9–10.3)
CHLORIDE SERPL-SCNC: 113 MMOL/L (ref 101–111)
CHOLEST SERPL-MCNC: 149 MG/DL
CONV CO2: 25 MMOL/L (ref 22–32)
CONV TOTAL PROTEIN: 5.7 G/DL (ref 6.1–7.9)
CREAT UR-MCNC: 1.1 MG/DL (ref 0.4–1)
DIFFERENTIAL METHOD BLD: (no result)
EOSINOPHIL # BLD AUTO: 0 10*3/UL (ref 0–0.3)
EOSINOPHIL # BLD AUTO: 1 % (ref 0–3)
ERYTHROCYTE [DISTWIDTH] IN BLOOD BY AUTOMATED COUNT: 13.6 % (ref 11.5–14.5)
GLOBULIN UR ELPH-MCNC: 1.7 G/DL (ref 2.5–3.8)
GLUCOSE SERPL-MCNC: 81 MG/DL (ref 65–99)
HCT VFR BLD AUTO: 38.8 % (ref 35–49)
HGB BLD-MCNC: 12.6 G/DL (ref 12–15)
LYMPHOCYTES # BLD AUTO: 1 10*3/UL (ref 0.8–4.8)
LYMPHOCYTES NFR BLD AUTO: 28 % (ref 18–42)
MAGNESIUM SERPL-MCNC: 1.6 MG/DL (ref 1.8–2.5)
MCH RBC QN AUTO: 30.3 PG (ref 26–32)
MCHC RBC AUTO-ENTMCNC: 32.5 G/DL (ref 32–36)
MCV RBC AUTO: 93.4 FL (ref 80–94)
MONOCYTES # BLD AUTO: 0.5 10*3/UL (ref 0.1–1.3)
MONOCYTES NFR BLD AUTO: 14 % (ref 2–11)
NEUTROPHILS # BLD AUTO: 2.1 10*3/UL (ref 2.3–8.6)
NEUTROPHILS NFR BLD AUTO: 56 % (ref 50–75)
NRBC BLD AUTO-RTO: 0 /100{WBCS}
NRBC/RBC NFR BLD MANUAL: 0 10*3/UL
PHOSPHATE SERPL-MCNC: 3.1 MG/DL (ref 2.4–4.7)
PLATELET # BLD AUTO: 110 10*3/UL (ref 150–450)
PMV BLD AUTO: 7.7 FL (ref 7.4–10.4)
POTASSIUM SERPL-SCNC: 4.5 MMOL/L (ref 3.6–5.1)
RBC # BLD AUTO: 4.15 10*6/UL (ref 4–5.4)
SODIUM SERPL-SCNC: 143 MMOL/L (ref 136–144)
TRIGL SERPL-MCNC: 89 MG/DL
WBC # BLD AUTO: 3.6 10*3/UL (ref 4.5–11.5)

## 2018-02-12 ENCOUNTER — HOSPITAL ENCOUNTER (OUTPATIENT)
Dept: LAB | Facility: HOSPITAL | Age: 73
Setting detail: SPECIMEN
Discharge: HOME | End: 2018-02-12
Attending: TRANSPLANT SURGERY | Admitting: TRANSPLANT SURGERY

## 2018-02-12 LAB
ALBUMIN SERPL-MCNC: 4.1 G/DL (ref 3.5–4.8)
ALBUMIN/GLOB SERPL: 2.4 {RATIO} (ref 1–1.7)
ALP SERPL-CCNC: 58 IU/L (ref 32–91)
ALT SERPL-CCNC: 13 IU/L (ref 14–54)
ANION GAP SERPL CALC-SCNC: 10.2 MMOL/L (ref 10–20)
AST SERPL-CCNC: 18 IU/L (ref 15–41)
BASOPHILS # BLD AUTO: 0 10*3/UL (ref 0–0.2)
BASOPHILS NFR BLD AUTO: 1 % (ref 0–2)
BILIRUB SERPL-MCNC: 0.9 MG/DL (ref 0.3–1.2)
BUN SERPL-MCNC: 15 MG/DL (ref 8–20)
BUN/CREAT SERPL: 16.7 (ref 5.4–26.2)
CALCIUM SERPL-MCNC: 10.3 MG/DL (ref 8.9–10.3)
CHLORIDE SERPL-SCNC: 109 MMOL/L (ref 101–111)
CHOLEST SERPL-MCNC: 162 MG/DL
CONV CO2: 25 MMOL/L (ref 22–32)
CONV TOTAL PROTEIN: 5.8 G/DL (ref 6.1–7.9)
CREAT UR-MCNC: 0.9 MG/DL (ref 0.4–1)
DIFFERENTIAL METHOD BLD: (no result)
EOSINOPHIL # BLD AUTO: 0.1 10*3/UL (ref 0–0.3)
EOSINOPHIL # BLD AUTO: 1 % (ref 0–3)
ERYTHROCYTE [DISTWIDTH] IN BLOOD BY AUTOMATED COUNT: 13.3 % (ref 11.5–14.5)
GLOBULIN UR ELPH-MCNC: 1.7 G/DL (ref 2.5–3.8)
GLUCOSE SERPL-MCNC: 83 MG/DL (ref 65–99)
HCT VFR BLD AUTO: 40.4 % (ref 35–49)
HGB BLD-MCNC: 13.2 G/DL (ref 12–15)
LYMPHOCYTES # BLD AUTO: 1.1 10*3/UL (ref 0.8–4.8)
LYMPHOCYTES NFR BLD AUTO: 25 % (ref 18–42)
MAGNESIUM SERPL-MCNC: 1.3 MG/DL (ref 1.8–2.5)
MCH RBC QN AUTO: 30.3 PG (ref 26–32)
MCHC RBC AUTO-ENTMCNC: 32.7 G/DL (ref 32–36)
MCV RBC AUTO: 92.7 FL (ref 80–94)
MONOCYTES # BLD AUTO: 0.6 10*3/UL (ref 0.1–1.3)
MONOCYTES NFR BLD AUTO: 13 % (ref 2–11)
NEUTROPHILS # BLD AUTO: 2.7 10*3/UL (ref 2.3–8.6)
NEUTROPHILS NFR BLD AUTO: 60 % (ref 50–75)
NRBC BLD AUTO-RTO: 0 /100{WBCS}
NRBC/RBC NFR BLD MANUAL: 0 10*3/UL
PHOSPHATE SERPL-MCNC: 3.1 MG/DL (ref 2.4–4.7)
PLATELET # BLD AUTO: 123 10*3/UL (ref 150–450)
PMV BLD AUTO: 7.3 FL (ref 7.4–10.4)
POTASSIUM SERPL-SCNC: 4.2 MMOL/L (ref 3.6–5.1)
RBC # BLD AUTO: 4.37 10*6/UL (ref 4–5.4)
SODIUM SERPL-SCNC: 140 MMOL/L (ref 136–144)
TRIGL SERPL-MCNC: 142 MG/DL
WBC # BLD AUTO: 4.5 10*3/UL (ref 4.5–11.5)

## 2018-02-26 ENCOUNTER — HOSPITAL ENCOUNTER (OUTPATIENT)
Dept: LAB | Facility: HOSPITAL | Age: 73
Setting detail: SPECIMEN
Discharge: HOME | End: 2018-02-26
Attending: TRANSPLANT SURGERY | Admitting: TRANSPLANT SURGERY

## 2018-02-26 LAB
ALBUMIN SERPL-MCNC: 4.1 G/DL (ref 3.5–4.8)
ALBUMIN/GLOB SERPL: 2.9 {RATIO} (ref 1–1.7)
ALP SERPL-CCNC: 57 IU/L (ref 32–91)
ALT SERPL-CCNC: 13 IU/L (ref 14–54)
ANION GAP SERPL CALC-SCNC: 8.3 MMOL/L (ref 10–20)
AST SERPL-CCNC: 16 IU/L (ref 15–41)
BASOPHILS # BLD AUTO: 0 10*3/UL (ref 0–0.2)
BASOPHILS NFR BLD AUTO: 1 % (ref 0–2)
BILIRUB SERPL-MCNC: 0.6 MG/DL (ref 0.3–1.2)
BUN SERPL-MCNC: 13 MG/DL (ref 8–20)
BUN/CREAT SERPL: 13 (ref 5.4–26.2)
CALCIUM SERPL-MCNC: 10.1 MG/DL (ref 8.9–10.3)
CHLORIDE SERPL-SCNC: 111 MMOL/L (ref 101–111)
CHOLEST SERPL-MCNC: 148 MG/DL
CONV CO2: 26 MMOL/L (ref 22–32)
CONV TOTAL PROTEIN: 5.5 G/DL (ref 6.1–7.9)
CREAT UR-MCNC: 1 MG/DL (ref 0.4–1)
DIFFERENTIAL METHOD BLD: (no result)
EOSINOPHIL # BLD AUTO: 0.1 10*3/UL (ref 0–0.3)
EOSINOPHIL # BLD AUTO: 2 % (ref 0–3)
ERYTHROCYTE [DISTWIDTH] IN BLOOD BY AUTOMATED COUNT: 13.5 % (ref 11.5–14.5)
GLOBULIN UR ELPH-MCNC: 1.4 G/DL (ref 2.5–3.8)
GLUCOSE SERPL-MCNC: 84 MG/DL (ref 65–99)
HCT VFR BLD AUTO: 38.2 % (ref 35–49)
HGB BLD-MCNC: 12.6 G/DL (ref 12–15)
LYMPHOCYTES # BLD AUTO: 0.9 10*3/UL (ref 0.8–4.8)
LYMPHOCYTES NFR BLD AUTO: 24 % (ref 18–42)
MAGNESIUM SERPL-MCNC: 1.5 MG/DL (ref 1.8–2.5)
MCH RBC QN AUTO: 30.4 PG (ref 26–32)
MCHC RBC AUTO-ENTMCNC: 33 G/DL (ref 32–36)
MCV RBC AUTO: 92.2 FL (ref 80–94)
MONOCYTES # BLD AUTO: 0.6 10*3/UL (ref 0.1–1.3)
MONOCYTES NFR BLD AUTO: 15 % (ref 2–11)
NEUTROPHILS # BLD AUTO: 2.3 10*3/UL (ref 2.3–8.6)
NEUTROPHILS NFR BLD AUTO: 58 % (ref 50–75)
NRBC BLD AUTO-RTO: 0 /100{WBCS}
NRBC/RBC NFR BLD MANUAL: 0 10*3/UL
PHOSPHATE SERPL-MCNC: 3.7 MG/DL (ref 2.4–4.7)
PLATELET # BLD AUTO: 133 10*3/UL (ref 150–450)
PMV BLD AUTO: 7.2 FL (ref 7.4–10.4)
POTASSIUM SERPL-SCNC: 4.3 MMOL/L (ref 3.6–5.1)
RBC # BLD AUTO: 4.15 10*6/UL (ref 4–5.4)
SODIUM SERPL-SCNC: 141 MMOL/L (ref 136–144)
TRIGL SERPL-MCNC: 120 MG/DL
WBC # BLD AUTO: 3.8 10*3/UL (ref 4.5–11.5)

## 2018-03-13 ENCOUNTER — HOSPITAL ENCOUNTER (OUTPATIENT)
Dept: LAB | Facility: HOSPITAL | Age: 73
Setting detail: SPECIMEN
Discharge: HOME | End: 2018-03-13
Attending: TRANSPLANT SURGERY | Admitting: TRANSPLANT SURGERY

## 2018-03-13 LAB
ALBUMIN SERPL-MCNC: 4.1 G/DL (ref 3.5–4.8)
ALBUMIN/GLOB SERPL: 2.2 {RATIO} (ref 1–1.7)
ALP SERPL-CCNC: 53 IU/L (ref 32–91)
ALT SERPL-CCNC: 13 IU/L (ref 14–54)
ANION GAP SERPL CALC-SCNC: 10.9 MMOL/L (ref 10–20)
AST SERPL-CCNC: 17 IU/L (ref 15–41)
BASOPHILS # BLD AUTO: 0 10*3/UL (ref 0–0.2)
BASOPHILS NFR BLD AUTO: 1 % (ref 0–2)
BILIRUB SERPL-MCNC: 1.1 MG/DL (ref 0.3–1.2)
BUN SERPL-MCNC: 11 MG/DL (ref 8–20)
BUN/CREAT SERPL: 12.2 (ref 5.4–26.2)
CALCIUM SERPL-MCNC: 10.4 MG/DL (ref 8.9–10.3)
CHLORIDE SERPL-SCNC: 109 MMOL/L (ref 101–111)
CHOLEST SERPL-MCNC: 160 MG/DL
CONV CO2: 25 MMOL/L (ref 22–32)
CONV TOTAL PROTEIN: 6 G/DL (ref 6.1–7.9)
CREAT UR-MCNC: 0.9 MG/DL (ref 0.4–1)
DIFFERENTIAL METHOD BLD: (no result)
EOSINOPHIL # BLD AUTO: 0.1 10*3/UL (ref 0–0.3)
EOSINOPHIL # BLD AUTO: 2 % (ref 0–3)
ERYTHROCYTE [DISTWIDTH] IN BLOOD BY AUTOMATED COUNT: 13.7 % (ref 11.5–14.5)
GLOBULIN UR ELPH-MCNC: 1.9 G/DL (ref 2.5–3.8)
GLUCOSE SERPL-MCNC: 83 MG/DL (ref 65–99)
HCT VFR BLD AUTO: 38.9 % (ref 35–49)
HGB BLD-MCNC: 13 G/DL (ref 12–15)
LYMPHOCYTES # BLD AUTO: 0.8 10*3/UL (ref 0.8–4.8)
LYMPHOCYTES NFR BLD AUTO: 25 % (ref 18–42)
MAGNESIUM SERPL-MCNC: 1.5 MG/DL (ref 1.8–2.5)
MCH RBC QN AUTO: 30.8 PG (ref 26–32)
MCHC RBC AUTO-ENTMCNC: 33.3 G/DL (ref 32–36)
MCV RBC AUTO: 92.5 FL (ref 80–94)
MONOCYTES # BLD AUTO: 0.5 10*3/UL (ref 0.1–1.3)
MONOCYTES NFR BLD AUTO: 15 % (ref 2–11)
NEUTROPHILS # BLD AUTO: 2 10*3/UL (ref 2.3–8.6)
NEUTROPHILS NFR BLD AUTO: 57 % (ref 50–75)
NRBC BLD AUTO-RTO: 0 /100{WBCS}
NRBC/RBC NFR BLD MANUAL: 0 10*3/UL
PHOSPHATE SERPL-MCNC: 3.6 MG/DL (ref 2.4–4.7)
PLATELET # BLD AUTO: 145 10*3/UL (ref 150–450)
PMV BLD AUTO: 7.5 FL (ref 7.4–10.4)
POTASSIUM SERPL-SCNC: 3.9 MMOL/L (ref 3.6–5.1)
RBC # BLD AUTO: 4.21 10*6/UL (ref 4–5.4)
SODIUM SERPL-SCNC: 141 MMOL/L (ref 136–144)
TRIGL SERPL-MCNC: 116 MG/DL
WBC # BLD AUTO: 3.4 10*3/UL (ref 4.5–11.5)

## 2018-03-27 ENCOUNTER — HOSPITAL ENCOUNTER (OUTPATIENT)
Dept: LAB | Facility: HOSPITAL | Age: 73
Setting detail: SPECIMEN
Discharge: HOME | End: 2018-03-27
Attending: TRANSPLANT SURGERY | Admitting: TRANSPLANT SURGERY

## 2018-03-27 LAB
ALBUMIN SERPL-MCNC: 4.3 G/DL (ref 3.5–4.8)
ALBUMIN/GLOB SERPL: 2.9 {RATIO} (ref 1–1.7)
ALP SERPL-CCNC: 54 IU/L (ref 32–91)
ALT SERPL-CCNC: 11 IU/L (ref 14–54)
ANION GAP SERPL CALC-SCNC: 13.1 MMOL/L (ref 10–20)
AST SERPL-CCNC: 14 IU/L (ref 15–41)
BASOPHILS # BLD AUTO: 0 10*3/UL (ref 0–0.2)
BASOPHILS NFR BLD AUTO: 1 % (ref 0–2)
BILIRUB SERPL-MCNC: 0.8 MG/DL (ref 0.3–1.2)
BUN SERPL-MCNC: 14 MG/DL (ref 8–20)
BUN/CREAT SERPL: 15.6 (ref 5.4–26.2)
CALCIUM SERPL-MCNC: 10.4 MG/DL (ref 8.9–10.3)
CHLORIDE SERPL-SCNC: 108 MMOL/L (ref 101–111)
CHOLEST SERPL-MCNC: 154 MG/DL
CONV CO2: 25 MMOL/L (ref 22–32)
CONV TOTAL PROTEIN: 5.8 G/DL (ref 6.1–7.9)
CREAT UR-MCNC: 0.9 MG/DL (ref 0.4–1)
DIFFERENTIAL METHOD BLD: (no result)
EOSINOPHIL # BLD AUTO: 0 10*3/UL (ref 0–0.3)
EOSINOPHIL # BLD AUTO: 1 % (ref 0–3)
ERYTHROCYTE [DISTWIDTH] IN BLOOD BY AUTOMATED COUNT: 13.6 % (ref 11.5–14.5)
GLOBULIN UR ELPH-MCNC: 1.5 G/DL (ref 2.5–3.8)
GLUCOSE SERPL-MCNC: 85 MG/DL (ref 65–99)
HCT VFR BLD AUTO: 40.9 % (ref 35–49)
HGB BLD-MCNC: 13.6 G/DL (ref 12–15)
LYMPHOCYTES # BLD AUTO: 1 10*3/UL (ref 0.8–4.8)
LYMPHOCYTES NFR BLD AUTO: 27 % (ref 18–42)
MAGNESIUM SERPL-MCNC: 1.6 MG/DL (ref 1.8–2.5)
MCH RBC QN AUTO: 30.8 PG (ref 26–32)
MCHC RBC AUTO-ENTMCNC: 33.2 G/DL (ref 32–36)
MCV RBC AUTO: 92.7 FL (ref 80–94)
MONOCYTES # BLD AUTO: 0.6 10*3/UL (ref 0.1–1.3)
MONOCYTES NFR BLD AUTO: 15 % (ref 2–11)
NEUTROPHILS # BLD AUTO: 2.1 10*3/UL (ref 2.3–8.6)
NEUTROPHILS NFR BLD AUTO: 56 % (ref 50–75)
NRBC BLD AUTO-RTO: 0 /100{WBCS}
NRBC/RBC NFR BLD MANUAL: 0 10*3/UL
PHOSPHATE SERPL-MCNC: 3.5 MG/DL (ref 2.4–4.7)
PLATELET # BLD AUTO: 145 10*3/UL (ref 150–450)
PMV BLD AUTO: 7.5 FL (ref 7.4–10.4)
POTASSIUM SERPL-SCNC: 4.1 MMOL/L (ref 3.6–5.1)
RBC # BLD AUTO: 4.42 10*6/UL (ref 4–5.4)
SODIUM SERPL-SCNC: 142 MMOL/L (ref 136–144)
TRIGL SERPL-MCNC: 115 MG/DL
WBC # BLD AUTO: 3.8 10*3/UL (ref 4.5–11.5)

## 2018-04-10 ENCOUNTER — HOSPITAL ENCOUNTER (OUTPATIENT)
Dept: LAB | Facility: HOSPITAL | Age: 73
Setting detail: SPECIMEN
Discharge: HOME | End: 2018-04-10
Attending: TRANSPLANT SURGERY | Admitting: TRANSPLANT SURGERY

## 2018-04-10 LAB
ALBUMIN SERPL-MCNC: 4.2 G/DL (ref 3.5–4.8)
ALBUMIN/GLOB SERPL: 2.6 {RATIO} (ref 1–1.7)
ALP SERPL-CCNC: 55 IU/L (ref 32–91)
ALT SERPL-CCNC: 14 IU/L (ref 14–54)
ANION GAP SERPL CALC-SCNC: 10.8 MMOL/L (ref 10–20)
AST SERPL-CCNC: 16 IU/L (ref 15–41)
BASOPHILS # BLD AUTO: 0 10*3/UL (ref 0–0.2)
BASOPHILS NFR BLD AUTO: 1 % (ref 0–2)
BILIRUB SERPL-MCNC: 1.2 MG/DL (ref 0.3–1.2)
BUN SERPL-MCNC: 12 MG/DL (ref 8–20)
BUN/CREAT SERPL: 13.3 (ref 5.4–26.2)
CALCIUM SERPL-MCNC: 10.6 MG/DL (ref 8.9–10.3)
CHLORIDE SERPL-SCNC: 110 MMOL/L (ref 101–111)
CHOLEST SERPL-MCNC: 152 MG/DL
CONV CO2: 26 MMOL/L (ref 22–32)
CONV TOTAL PROTEIN: 5.8 G/DL (ref 6.1–7.9)
CREAT UR-MCNC: 0.9 MG/DL (ref 0.4–1)
DIFFERENTIAL METHOD BLD: (no result)
EOSINOPHIL # BLD AUTO: 0 10*3/UL (ref 0–0.3)
EOSINOPHIL # BLD AUTO: 1 % (ref 0–3)
ERYTHROCYTE [DISTWIDTH] IN BLOOD BY AUTOMATED COUNT: 13.6 % (ref 11.5–14.5)
GLOBULIN UR ELPH-MCNC: 1.6 G/DL (ref 2.5–3.8)
GLUCOSE SERPL-MCNC: 88 MG/DL (ref 65–99)
HCT VFR BLD AUTO: 42.6 % (ref 35–49)
HGB BLD-MCNC: 14 G/DL (ref 12–15)
LYMPHOCYTES # BLD AUTO: 1.1 10*3/UL (ref 0.8–4.8)
LYMPHOCYTES NFR BLD AUTO: 27 % (ref 18–42)
MAGNESIUM SERPL-MCNC: 1.6 MG/DL (ref 1.8–2.5)
MCH RBC QN AUTO: 30.5 PG (ref 26–32)
MCHC RBC AUTO-ENTMCNC: 32.8 G/DL (ref 32–36)
MCV RBC AUTO: 92.9 FL (ref 80–94)
MONOCYTES # BLD AUTO: 0.6 10*3/UL (ref 0.1–1.3)
MONOCYTES NFR BLD AUTO: 14 % (ref 2–11)
NEUTROPHILS # BLD AUTO: 2.3 10*3/UL (ref 2.3–8.6)
NEUTROPHILS NFR BLD AUTO: 57 % (ref 50–75)
NRBC BLD AUTO-RTO: 0 /100{WBCS}
NRBC/RBC NFR BLD MANUAL: 0 10*3/UL
PHOSPHATE SERPL-MCNC: 3.2 MG/DL (ref 2.4–4.7)
PLATELET # BLD AUTO: 142 10*3/UL (ref 150–450)
PMV BLD AUTO: 7.5 FL (ref 7.4–10.4)
POTASSIUM SERPL-SCNC: 3.8 MMOL/L (ref 3.6–5.1)
RBC # BLD AUTO: 4.58 10*6/UL (ref 4–5.4)
SODIUM SERPL-SCNC: 143 MMOL/L (ref 136–144)
WBC # BLD AUTO: 4 10*3/UL (ref 4.5–11.5)

## 2018-04-23 ENCOUNTER — HOSPITAL ENCOUNTER (OUTPATIENT)
Dept: LAB | Facility: HOSPITAL | Age: 73
Setting detail: SPECIMEN
Discharge: HOME | End: 2018-04-23
Attending: TRANSPLANT SURGERY | Admitting: TRANSPLANT SURGERY

## 2018-04-23 LAB
ALBUMIN SERPL-MCNC: 3.8 G/DL (ref 3.5–4.8)
ALBUMIN/GLOB SERPL: 2 {RATIO} (ref 1–1.7)
ALP SERPL-CCNC: 53 IU/L (ref 32–91)
ALT SERPL-CCNC: 14 IU/L (ref 14–54)
ANION GAP SERPL CALC-SCNC: 10.9 MMOL/L (ref 10–20)
AST SERPL-CCNC: 17 IU/L (ref 15–41)
BASOPHILS # BLD AUTO: 0 10*3/UL (ref 0–0.2)
BASOPHILS NFR BLD AUTO: 1 % (ref 0–2)
BILIRUB SERPL-MCNC: 0.8 MG/DL (ref 0.3–1.2)
BUN SERPL-MCNC: 13 MG/DL (ref 8–20)
BUN/CREAT SERPL: 14.4 (ref 5.4–26.2)
CALCIUM SERPL-MCNC: 10.3 MG/DL (ref 8.9–10.3)
CHLORIDE SERPL-SCNC: 108 MMOL/L (ref 101–111)
CHOLEST SERPL-MCNC: 154 MG/DL
CONV CO2: 24 MMOL/L (ref 22–32)
CONV TOTAL PROTEIN: 5.7 G/DL (ref 6.1–7.9)
CREAT UR-MCNC: 0.9 MG/DL (ref 0.4–1)
DIFFERENTIAL METHOD BLD: (no result)
EOSINOPHIL # BLD AUTO: 0.1 10*3/UL (ref 0–0.3)
EOSINOPHIL # BLD AUTO: 2 % (ref 0–3)
ERYTHROCYTE [DISTWIDTH] IN BLOOD BY AUTOMATED COUNT: 13.7 % (ref 11.5–14.5)
GLOBULIN UR ELPH-MCNC: 1.9 G/DL (ref 2.5–3.8)
GLUCOSE SERPL-MCNC: 95 MG/DL (ref 65–99)
HCT VFR BLD AUTO: 40.6 % (ref 35–49)
HGB BLD-MCNC: 13.4 G/DL (ref 12–15)
LYMPHOCYTES # BLD AUTO: 1 10*3/UL (ref 0.8–4.8)
LYMPHOCYTES NFR BLD AUTO: 26 % (ref 18–42)
MAGNESIUM SERPL-MCNC: 1.7 MG/DL (ref 1.8–2.5)
MCH RBC QN AUTO: 30.6 PG (ref 26–32)
MCHC RBC AUTO-ENTMCNC: 33 G/DL (ref 32–36)
MCV RBC AUTO: 92.8 FL (ref 80–94)
MONOCYTES # BLD AUTO: 0.5 10*3/UL (ref 0.1–1.3)
MONOCYTES NFR BLD AUTO: 14 % (ref 2–11)
NEUTROPHILS # BLD AUTO: 2.3 10*3/UL (ref 2.3–8.6)
NEUTROPHILS NFR BLD AUTO: 57 % (ref 50–75)
NRBC BLD AUTO-RTO: 0 /100{WBCS}
NRBC/RBC NFR BLD MANUAL: 0 10*3/UL
PHOSPHATE SERPL-MCNC: 3.3 MG/DL (ref 2.4–4.7)
PLATELET # BLD AUTO: 123 10*3/UL (ref 150–450)
PMV BLD AUTO: 7.4 FL (ref 7.4–10.4)
POTASSIUM SERPL-SCNC: 3.9 MMOL/L (ref 3.6–5.1)
RBC # BLD AUTO: 4.38 10*6/UL (ref 4–5.4)
SODIUM SERPL-SCNC: 139 MMOL/L (ref 136–144)
TRIGL SERPL-MCNC: 124 MG/DL
WBC # BLD AUTO: 4 10*3/UL (ref 4.5–11.5)

## 2018-05-15 ENCOUNTER — HOSPITAL ENCOUNTER (OUTPATIENT)
Dept: LAB | Facility: HOSPITAL | Age: 73
Discharge: HOME | End: 2018-05-15
Attending: TRANSPLANT SURGERY | Admitting: TRANSPLANT SURGERY

## 2018-05-15 LAB
ALBUMIN SERPL-MCNC: 3.8 G/DL (ref 3.5–4.8)
ALBUMIN/GLOB SERPL: 1.7 {RATIO} (ref 1–1.7)
ALP SERPL-CCNC: 57 IU/L (ref 32–91)
ALT SERPL-CCNC: 14 IU/L (ref 14–54)
ANION GAP SERPL CALC-SCNC: 9.8 MMOL/L (ref 10–20)
AST SERPL-CCNC: 15 IU/L (ref 15–41)
BASOPHILS # BLD AUTO: 0 10*3/UL (ref 0–0.2)
BASOPHILS NFR BLD AUTO: 0 % (ref 0–2)
BILIRUB SERPL-MCNC: 0.7 MG/DL (ref 0.3–1.2)
BUN SERPL-MCNC: 22 MG/DL (ref 8–20)
BUN/CREAT SERPL: 27.5 (ref 5.4–26.2)
CALCIUM SERPL-MCNC: 10.3 MG/DL (ref 8.9–10.3)
CHLORIDE SERPL-SCNC: 110 MMOL/L (ref 101–111)
CHOLEST SERPL-MCNC: 125 MG/DL
CONV CO2: 25 MMOL/L (ref 22–32)
CONV TOTAL PROTEIN: 6.1 G/DL (ref 6.1–7.9)
CREAT UR-MCNC: 0.8 MG/DL (ref 0.4–1)
DIFFERENTIAL METHOD BLD: (no result)
EOSINOPHIL # BLD AUTO: 0 10*3/UL (ref 0–0.3)
EOSINOPHIL # BLD AUTO: 1 % (ref 0–3)
ERYTHROCYTE [DISTWIDTH] IN BLOOD BY AUTOMATED COUNT: 13.8 % (ref 11.5–14.5)
GLOBULIN UR ELPH-MCNC: 2.3 G/DL (ref 2.5–3.8)
GLUCOSE SERPL-MCNC: 93 MG/DL (ref 65–99)
HCT VFR BLD AUTO: 42.2 % (ref 35–49)
HGB BLD-MCNC: 13.8 G/DL (ref 12–15)
LYMPHOCYTES # BLD AUTO: 1 10*3/UL (ref 0.8–4.8)
LYMPHOCYTES NFR BLD AUTO: 24 % (ref 18–42)
Lab: 24
MAGNESIUM SERPL-MCNC: 1.5 MG/DL (ref 1.8–2.5)
MCH RBC QN AUTO: 30 PG (ref 26–32)
MCHC RBC AUTO-ENTMCNC: 32.6 G/DL (ref 32–36)
MCV RBC AUTO: 91.8 FL (ref 80–94)
MONOCYTES # BLD AUTO: 0.6 10*3/UL (ref 0.1–1.3)
MONOCYTES NFR BLD AUTO: 14 % (ref 2–11)
NEUTROPHILS # BLD AUTO: 2.4 10*3/UL (ref 2.3–8.6)
NEUTROPHILS NFR BLD AUTO: 61 % (ref 50–75)
NRBC BLD AUTO-RTO: 0 /100{WBCS}
NRBC/RBC NFR BLD MANUAL: 0 10*3/UL
PHOSPHATE SERPL-MCNC: 3.3 MG/DL (ref 2.4–4.7)
PLATELET # BLD AUTO: 157 10*3/UL (ref 150–450)
PMV BLD AUTO: 7.7 FL (ref 7.4–10.4)
POTASSIUM SERPL-SCNC: 3.8 MMOL/L (ref 3.6–5.1)
RBC # BLD AUTO: 4.6 10*6/UL (ref 4–5.4)
SODIUM SERPL-SCNC: 141 MMOL/L (ref 136–144)
TRIGL SERPL-MCNC: 117 MG/DL
URINE VOLUME: 200 ML
WBC # BLD AUTO: 4 10*3/UL (ref 4.5–11.5)

## 2018-05-29 ENCOUNTER — HOSPITAL ENCOUNTER (OUTPATIENT)
Dept: LAB | Facility: HOSPITAL | Age: 73
Setting detail: SPECIMEN
Discharge: HOME | End: 2018-05-29
Attending: TRANSPLANT SURGERY | Admitting: TRANSPLANT SURGERY

## 2018-05-29 LAB
ALBUMIN SERPL-MCNC: 3.6 G/DL (ref 3.5–4.8)
ALBUMIN/GLOB SERPL: 1.8 {RATIO} (ref 1–1.7)
ALP SERPL-CCNC: 51 IU/L (ref 32–91)
ALT SERPL-CCNC: 13 IU/L (ref 14–54)
ANION GAP SERPL CALC-SCNC: 9.4 MMOL/L (ref 10–20)
AST SERPL-CCNC: 17 IU/L (ref 15–41)
BASOPHILS # BLD AUTO: 0 10*3/UL (ref 0–0.2)
BASOPHILS NFR BLD AUTO: 1 % (ref 0–2)
BILIRUB SERPL-MCNC: 0.6 MG/DL (ref 0.3–1.2)
BUN SERPL-MCNC: 11 MG/DL (ref 8–20)
BUN/CREAT SERPL: 12.2 (ref 5.4–26.2)
CALCIUM SERPL-MCNC: 10 MG/DL (ref 8.9–10.3)
CHLORIDE SERPL-SCNC: 109 MMOL/L (ref 101–111)
CHOLEST SERPL-MCNC: 153 MG/DL
CONV CO2: 26 MMOL/L (ref 22–32)
CONV TOTAL PROTEIN: 5.6 G/DL (ref 6.1–7.9)
CREAT UR-MCNC: 0.9 MG/DL (ref 0.4–1)
DIFFERENTIAL METHOD BLD: (no result)
EOSINOPHIL # BLD AUTO: 0 % (ref 0–3)
EOSINOPHIL # BLD AUTO: 0 10*3/UL (ref 0–0.3)
ERYTHROCYTE [DISTWIDTH] IN BLOOD BY AUTOMATED COUNT: 13.8 % (ref 11.5–14.5)
GLOBULIN UR ELPH-MCNC: 2 G/DL (ref 2.5–3.8)
GLUCOSE SERPL-MCNC: 81 MG/DL (ref 65–99)
HCT VFR BLD AUTO: 41 % (ref 35–49)
HGB BLD-MCNC: 13.4 G/DL (ref 12–15)
LYMPHOCYTES # BLD AUTO: 1 10*3/UL (ref 0.8–4.8)
LYMPHOCYTES NFR BLD AUTO: 22 % (ref 18–42)
MCH RBC QN AUTO: 30.2 PG (ref 26–32)
MCHC RBC AUTO-ENTMCNC: 32.6 G/DL (ref 32–36)
MCV RBC AUTO: 92.6 FL (ref 80–94)
MONOCYTES # BLD AUTO: 0.5 10*3/UL (ref 0.1–1.3)
MONOCYTES NFR BLD AUTO: 11 % (ref 2–11)
NEUTROPHILS # BLD AUTO: 2.9 10*3/UL (ref 2.3–8.6)
NEUTROPHILS NFR BLD AUTO: 66 % (ref 50–75)
NRBC BLD AUTO-RTO: 0 /100{WBCS}
NRBC/RBC NFR BLD MANUAL: 0 10*3/UL
PLATELET # BLD AUTO: 153 10*3/UL (ref 150–450)
PMV BLD AUTO: 7.6 FL (ref 7.4–10.4)
POTASSIUM SERPL-SCNC: 4.4 MMOL/L (ref 3.6–5.1)
RBC # BLD AUTO: 4.43 10*6/UL (ref 4–5.4)
SODIUM SERPL-SCNC: 140 MMOL/L (ref 136–144)
TRIGL SERPL-MCNC: 121 MG/DL
WBC # BLD AUTO: 4.4 10*3/UL (ref 4.5–11.5)

## 2018-06-12 ENCOUNTER — HOSPITAL ENCOUNTER (OUTPATIENT)
Dept: LAB | Facility: HOSPITAL | Age: 73
Setting detail: SPECIMEN
Discharge: HOME | End: 2018-06-12
Attending: TRANSPLANT SURGERY | Admitting: TRANSPLANT SURGERY

## 2018-06-12 LAB
ALBUMIN SERPL-MCNC: 3.7 G/DL (ref 3.5–4.8)
ALBUMIN/GLOB SERPL: 1.8 {RATIO} (ref 1–1.7)
ALP SERPL-CCNC: 51 IU/L (ref 32–91)
ALT SERPL-CCNC: 12 IU/L (ref 14–54)
ANION GAP SERPL CALC-SCNC: 11.3 MMOL/L (ref 10–20)
AST SERPL-CCNC: 17 IU/L (ref 15–41)
BASOPHILS # BLD AUTO: 0.1 10*3/UL (ref 0–0.2)
BASOPHILS NFR BLD AUTO: 2 % (ref 0–2)
BILIRUB SERPL-MCNC: 0.7 MG/DL (ref 0.3–1.2)
BUN SERPL-MCNC: 13 MG/DL (ref 8–20)
BUN/CREAT SERPL: 14.4 (ref 5.4–26.2)
CALCIUM SERPL-MCNC: 9.9 MG/DL (ref 8.9–10.3)
CHLORIDE SERPL-SCNC: 109 MMOL/L (ref 101–111)
CONV CO2: 27 MMOL/L (ref 22–32)
CONV TOTAL PROTEIN: 5.8 G/DL (ref 6.1–7.9)
CREAT UR-MCNC: 0.9 MG/DL (ref 0.4–1)
DIFFERENTIAL METHOD BLD: (no result)
EOSINOPHIL # BLD AUTO: 0 10*3/UL (ref 0–0.3)
EOSINOPHIL # BLD AUTO: 1 % (ref 0–3)
ERYTHROCYTE [DISTWIDTH] IN BLOOD BY AUTOMATED COUNT: 15.5 % (ref 11.5–14.5)
GLOBULIN UR ELPH-MCNC: 2.1 G/DL (ref 2.5–3.8)
GLUCOSE SERPL-MCNC: 78 MG/DL (ref 65–99)
HCT VFR BLD AUTO: 41 % (ref 35–49)
HGB BLD-MCNC: 13.5 G/DL (ref 12–15)
LYMPHOCYTES # BLD AUTO: 0.9 10*3/UL (ref 0.8–4.8)
LYMPHOCYTES NFR BLD AUTO: 25 % (ref 18–42)
MCH RBC QN AUTO: 31 PG (ref 26–32)
MCHC RBC AUTO-ENTMCNC: 32.9 G/DL (ref 32–36)
MCV RBC AUTO: 94.3 FL (ref 80–94)
MONOCYTES # BLD AUTO: 0.5 10*3/UL (ref 0.1–1.3)
MONOCYTES NFR BLD AUTO: 16 % (ref 2–11)
NEUTROPHILS # BLD AUTO: 2 10*3/UL (ref 2.3–8.6)
NEUTROPHILS NFR BLD AUTO: 56 % (ref 50–75)
NRBC BLD AUTO-RTO: 0 /100{WBCS}
NRBC/RBC NFR BLD MANUAL: 0 10*3/UL
PLATELET # BLD AUTO: 124 10*3/UL (ref 150–450)
PMV BLD AUTO: 7.7 FL (ref 7.4–10.4)
POTASSIUM SERPL-SCNC: 4.3 MMOL/L (ref 3.6–5.1)
RBC # BLD AUTO: 4.35 10*6/UL (ref 4–5.4)
SODIUM SERPL-SCNC: 143 MMOL/L (ref 136–144)
WBC # BLD AUTO: 3.5 10*3/UL (ref 4.5–11.5)

## 2018-06-27 ENCOUNTER — HOSPITAL ENCOUNTER (OUTPATIENT)
Dept: LAB | Facility: HOSPITAL | Age: 73
Setting detail: SPECIMEN
Discharge: HOME | End: 2018-06-27
Attending: TRANSPLANT SURGERY | Admitting: TRANSPLANT SURGERY

## 2018-06-27 LAB
ALBUMIN SERPL-MCNC: 3.7 G/DL (ref 3.5–4.8)
ALBUMIN/GLOB SERPL: 1.4 {RATIO} (ref 1–1.7)
ALP SERPL-CCNC: 50 IU/L (ref 32–91)
ALT SERPL-CCNC: 10 IU/L (ref 14–54)
ANION GAP SERPL CALC-SCNC: 8.2 MMOL/L (ref 10–20)
AST SERPL-CCNC: 17 IU/L (ref 15–41)
BACTERIA ISLT: ABNORMAL
BASOPHILS # BLD AUTO: 0 10*3/UL (ref 0–0.2)
BASOPHILS NFR BLD AUTO: 1 % (ref 0–2)
BILIRUB SERPL-MCNC: 0.8 MG/DL (ref 0.3–1.2)
BILIRUB UR QL STRIP: NEGATIVE MG/DL
BUN SERPL-MCNC: 10 MG/DL (ref 8–20)
BUN/CREAT SERPL: 11.1 (ref 5.4–26.2)
CALCIUM SERPL-MCNC: 10.5 MG/DL (ref 8.9–10.3)
CASTS URNS QL MICRO: ABNORMAL /[LPF]
CHLORIDE SERPL-SCNC: 109 MMOL/L (ref 101–111)
COLONY COUNT: ABNORMAL
COLOR UR: ABNORMAL
CONV BACTERIA IN URINE MICRO: ABNORMAL
CONV CLARITY OF URINE: ABNORMAL
CONV CO2: 28 MMOL/L (ref 22–32)
CONV HYALINE CASTS IN URINE MICRO: ABNORMAL /[LPF] (ref 0–5)
CONV PROTEIN IN URINE BY AUTOMATED TEST STRIP: NEGATIVE MG/DL
CONV SMALL ROUND CELLS: ABNORMAL /[HPF]
CONV TOTAL PROTEIN: 6.3 G/DL (ref 6.1–7.9)
CONV UROBILINOGEN IN URINE BY AUTOMATED TEST STRIP: 0.2 MG/DL
CREAT 24H UR-MCNC: 204.7 MG/DL
CREAT UR-MCNC: 0.9 MG/DL (ref 0.4–1)
CULTURE INDICATED?: ABNORMAL
DIFFERENTIAL METHOD BLD: (no result)
EOSINOPHIL # BLD AUTO: 0 10*3/UL (ref 0–0.3)
EOSINOPHIL # BLD AUTO: 1 % (ref 0–3)
ERYTHROCYTE [DISTWIDTH] IN BLOOD BY AUTOMATED COUNT: 14.9 % (ref 11.5–14.5)
GLOBULIN UR ELPH-MCNC: 2.6 G/DL (ref 2.5–3.8)
GLUCOSE SERPL-MCNC: 89 MG/DL (ref 65–99)
GLUCOSE UR QL: NEGATIVE MG/DL
HCT VFR BLD AUTO: 41.8 % (ref 35–49)
HGB BLD-MCNC: 13.9 G/DL (ref 12–15)
HGB UR QL STRIP: NEGATIVE
KETONES UR QL STRIP: NEGATIVE MG/DL
LEUKOCYTE ESTERASE UR QL STRIP: ABNORMAL
LEVOFLOXACIN SUSC ISLT: ABNORMAL
LYMPHOCYTES # BLD AUTO: 0.9 10*3/UL (ref 0.8–4.8)
LYMPHOCYTES NFR BLD AUTO: 18 % (ref 18–42)
Lab: ABNORMAL
MCH RBC QN AUTO: 31 PG (ref 26–32)
MCHC RBC AUTO-ENTMCNC: 33.2 G/DL (ref 32–36)
MCV RBC AUTO: 93.3 FL (ref 80–94)
MEROPENEM SUSC ISLT: ABNORMAL
MICRO REPORT STATUS: ABNORMAL
MONOCYTES # BLD AUTO: 0.8 10*3/UL (ref 0.1–1.3)
MONOCYTES NFR BLD AUTO: 16 % (ref 2–11)
NEUTROPHILS # BLD AUTO: 3.2 10*3/UL (ref 2.3–8.6)
NEUTROPHILS NFR BLD AUTO: 64 % (ref 50–75)
NITRITE UR QL STRIP: POSITIVE
NITROFURANTOIN SUSC ISLT: ABNORMAL
NRBC BLD AUTO-RTO: 0 /100{WBCS}
NRBC/RBC NFR BLD MANUAL: 0 10*3/UL
PH UR STRIP.AUTO: 5.5 [PH] (ref 4.5–8)
PLATELET # BLD AUTO: 148 10*3/UL (ref 150–450)
PMV BLD AUTO: 7.6 FL (ref 7.4–10.4)
POTASSIUM SERPL-SCNC: 4.2 MMOL/L (ref 3.6–5.1)
PROT UR-MCNC: 17 MG/DL
PROT/CREAT UR: 0.1 MG/MG (ref 0–22)
RBC # BLD AUTO: 4.48 10*6/UL (ref 4–5.4)
RBC #/AREA URNS HPF: 2 /[HPF] (ref 0–3)
SODIUM SERPL-SCNC: 141 MMOL/L (ref 136–144)
SP GR UR: 1.03 (ref 1–1.03)
SPECIMEN SOURCE: ABNORMAL
SPERM URNS QL MICRO: ABNORMAL /[HPF]
SQUAMOUS SPT QL MICRO: 2 /[HPF] (ref 0–5)
SUSC METH SPEC: ABNORMAL
TOBRAMYCIN SUSC ISLT: ABNORMAL
TRIMETHOPRIM/SULFA: ABNORMAL
UNIDENT CRYS URNS QL MICRO: ABNORMAL /[HPF]
URATE SERPL-MCNC: 6.1 MG/DL (ref 2.6–8)
WBC # BLD AUTO: 5 10*3/UL (ref 4.5–11.5)
WBC #/AREA URNS HPF: 68 /[HPF] (ref 0–5)
YEAST SPEC QL WET PREP: ABNORMAL /[HPF]

## 2018-07-11 ENCOUNTER — HOSPITAL ENCOUNTER (OUTPATIENT)
Dept: LAB | Facility: HOSPITAL | Age: 73
Setting detail: SPECIMEN
Discharge: HOME | End: 2018-07-11
Attending: TRANSPLANT SURGERY | Admitting: TRANSPLANT SURGERY

## 2018-07-11 LAB
ALBUMIN SERPL-MCNC: 3.6 G/DL (ref 3.5–4.8)
ALBUMIN/GLOB SERPL: 1.3 {RATIO} (ref 1–1.7)
ALP SERPL-CCNC: 58 IU/L (ref 32–91)
ALT SERPL-CCNC: 9 IU/L (ref 14–54)
ANION GAP SERPL CALC-SCNC: 8.2 MMOL/L (ref 10–20)
AST SERPL-CCNC: 15 IU/L (ref 15–41)
BACTERIA ISLT: ABNORMAL
BACTERIA SPEC AEROBE CULT: ABNORMAL
BASOPHILS # BLD AUTO: 0.1 10*3/UL (ref 0–0.2)
BASOPHILS NFR BLD AUTO: 1 % (ref 0–2)
BILIRUB SERPL-MCNC: 0.6 MG/DL (ref 0.3–1.2)
BILIRUB UR QL STRIP: NEGATIVE MG/DL
BUN SERPL-MCNC: 13 MG/DL (ref 8–20)
BUN/CREAT SERPL: 13 (ref 5.4–26.2)
CALCIUM SERPL-MCNC: 10.5 MG/DL (ref 8.9–10.3)
CASTS URNS QL MICRO: ABNORMAL /[LPF]
CHLORIDE SERPL-SCNC: 109 MMOL/L (ref 101–111)
COLONY COUNT: ABNORMAL
COLOR UR: ABNORMAL
CONV BACTERIA IN URINE MICRO: ABNORMAL
CONV CLARITY OF URINE: ABNORMAL
CONV CO2: 29 MMOL/L (ref 22–32)
CONV HYALINE CASTS IN URINE MICRO: 0 /[LPF] (ref 0–5)
CONV PROTEIN IN URINE BY AUTOMATED TEST STRIP: ABNORMAL MG/DL
CONV SMALL ROUND CELLS: ABNORMAL /[HPF]
CONV TOTAL PROTEIN: 6.4 G/DL (ref 6.1–7.9)
CONV UROBILINOGEN IN URINE BY AUTOMATED TEST STRIP: 0.2 MG/DL
CREAT 24H UR-MCNC: 324 MG/DL
CREAT UR-MCNC: 1 MG/DL (ref 0.4–1)
CULTURE INDICATED?: ABNORMAL
DIFFERENTIAL METHOD BLD: (no result)
EOSINOPHIL # BLD AUTO: 0.1 10*3/UL (ref 0–0.3)
EOSINOPHIL # BLD AUTO: 1 % (ref 0–3)
ERYTHROCYTE [DISTWIDTH] IN BLOOD BY AUTOMATED COUNT: 14.6 % (ref 11.5–14.5)
GLOBULIN UR ELPH-MCNC: 2.8 G/DL (ref 2.5–3.8)
GLUCOSE SERPL-MCNC: 82 MG/DL (ref 65–99)
GLUCOSE UR QL: NEGATIVE MG/DL
HCT VFR BLD AUTO: 40.9 % (ref 35–49)
HGB BLD-MCNC: 13.5 G/DL (ref 12–15)
HGB UR QL STRIP: NEGATIVE
KETONES UR QL STRIP: NEGATIVE MG/DL
LEUKOCYTE ESTERASE UR QL STRIP: ABNORMAL
LEVOFLOXACIN SUSC ISLT: ABNORMAL
LYMPHOCYTES # BLD AUTO: 1 10*3/UL (ref 0.8–4.8)
LYMPHOCYTES NFR BLD AUTO: 18 % (ref 18–42)
Lab: ABNORMAL
MCH RBC QN AUTO: 30.8 PG (ref 26–32)
MCHC RBC AUTO-ENTMCNC: 33 G/DL (ref 32–36)
MCV RBC AUTO: 93.3 FL (ref 80–94)
MEROPENEM SUSC ISLT: ABNORMAL
MICRO REPORT STATUS: ABNORMAL
MONOCYTES # BLD AUTO: 0.7 10*3/UL (ref 0.1–1.3)
MONOCYTES NFR BLD AUTO: 13 % (ref 2–11)
NEUTROPHILS # BLD AUTO: 3.6 10*3/UL (ref 2.3–8.6)
NEUTROPHILS NFR BLD AUTO: 67 % (ref 50–75)
NITRITE UR QL STRIP: POSITIVE
NITROFURANTOIN SUSC ISLT: ABNORMAL
NRBC BLD AUTO-RTO: 0 /100{WBCS}
NRBC/RBC NFR BLD MANUAL: 0 10*3/UL
PH UR STRIP.AUTO: 5.5 [PH] (ref 4.5–8)
PLATELET # BLD AUTO: 169 10*3/UL (ref 150–450)
PMV BLD AUTO: 7.5 FL (ref 7.4–10.4)
POTASSIUM SERPL-SCNC: 4.2 MMOL/L (ref 3.6–5.1)
PROT UR-MCNC: 25 MG/DL
PROT/CREAT UR: 0.1 MG/MG (ref 0–22)
RBC # BLD AUTO: 4.39 10*6/UL (ref 4–5.4)
RBC #/AREA URNS HPF: 9 /[HPF] (ref 0–3)
SODIUM SERPL-SCNC: 142 MMOL/L (ref 136–144)
SP GR UR: 1.03 (ref 1–1.03)
SPECIMEN SOURCE: ABNORMAL
SPERM URNS QL MICRO: ABNORMAL /[HPF]
SQUAMOUS SPT QL MICRO: 5 /[HPF] (ref 0–5)
SUSC METH SPEC: ABNORMAL
TIGECYCLINE ISLT MIC: ABNORMAL
TOBRAMYCIN SUSC ISLT: ABNORMAL
TRIMETHOPRIM/SULFA: ABNORMAL
UNIDENT CRYS URNS QL MICRO: ABNORMAL /[HPF]
WBC # BLD AUTO: 5.4 10*3/UL (ref 4.5–11.5)
WBC #/AREA URNS HPF: ABNORMAL /[HPF] (ref 0–5)
YEAST SPEC QL WET PREP: ABNORMAL /[HPF]

## 2018-08-08 ENCOUNTER — HOSPITAL ENCOUNTER (OUTPATIENT)
Dept: LAB | Facility: HOSPITAL | Age: 73
Setting detail: SPECIMEN
Discharge: HOME | End: 2018-08-08
Attending: TRANSPLANT SURGERY | Admitting: TRANSPLANT SURGERY

## 2018-08-08 LAB
ALBUMIN SERPL-MCNC: 3.9 G/DL (ref 3.5–4.8)
ALBUMIN/GLOB SERPL: 1.7 {RATIO} (ref 1–1.7)
ALP SERPL-CCNC: 45 IU/L (ref 32–91)
ALT SERPL-CCNC: 12 IU/L (ref 14–54)
ANION GAP SERPL CALC-SCNC: 8 MMOL/L (ref 10–20)
AST SERPL-CCNC: 18 IU/L (ref 15–41)
BACTERIA ISLT: ABNORMAL
BASOPHILS # BLD AUTO: 0 10*3/UL (ref 0–0.2)
BASOPHILS NFR BLD AUTO: 1 % (ref 0–2)
BILIRUB SERPL-MCNC: 0.9 MG/DL (ref 0.3–1.2)
BILIRUB UR QL STRIP: NEGATIVE MG/DL
BUN SERPL-MCNC: 10 MG/DL (ref 8–20)
BUN/CREAT SERPL: 11.1 (ref 5.4–26.2)
CALCIUM SERPL-MCNC: 10.5 MG/DL (ref 8.9–10.3)
CASTS URNS QL MICRO: ABNORMAL /[LPF]
CHLORIDE SERPL-SCNC: 109 MMOL/L (ref 101–111)
CHOLEST SERPL-MCNC: 162 MG/DL
COLONY COUNT: ABNORMAL
COLOR UR: ABNORMAL
CONV BACTERIA IN URINE MICRO: ABNORMAL
CONV CLARITY OF URINE: ABNORMAL
CONV CO2: 29 MMOL/L (ref 22–32)
CONV HIV-1/ HIV-2: NORMAL
CONV HIV-1/ HIV-2: NORMAL
CONV HYALINE CASTS IN URINE MICRO: ABNORMAL /[LPF] (ref 0–5)
CONV PROTEIN IN URINE BY AUTOMATED TEST STRIP: 30 MG/DL
CONV SMALL ROUND CELLS: ABNORMAL /[HPF]
CONV TOTAL PROTEIN: 6.2 G/DL (ref 6.1–7.9)
CONV UROBILINOGEN IN URINE BY AUTOMATED TEST STRIP: 0.2 MG/DL
CREAT 24H UR-MCNC: 321.1 MG/DL
CREAT UR-MCNC: 0.9 MG/DL (ref 0.4–1)
CULTURE INDICATED?: ABNORMAL
DIFFERENTIAL METHOD BLD: (no result)
EOSINOPHIL # BLD AUTO: 0.1 10*3/UL (ref 0–0.3)
EOSINOPHIL # BLD AUTO: 3 % (ref 0–3)
ERYTHROCYTE [DISTWIDTH] IN BLOOD BY AUTOMATED COUNT: 15.3 % (ref 11.5–14.5)
GLOBULIN UR ELPH-MCNC: 2.3 G/DL (ref 2.5–3.8)
GLUCOSE SERPL-MCNC: 84 MG/DL (ref 65–99)
GLUCOSE UR QL: NEGATIVE MG/DL
HCT VFR BLD AUTO: 38.9 % (ref 35–49)
HGB BLD-MCNC: 12.8 G/DL (ref 12–15)
HGB UR QL STRIP: NEGATIVE
KETONES UR QL STRIP: ABNORMAL MG/DL
LEUKOCYTE ESTERASE UR QL STRIP: ABNORMAL
LEVOFLOXACIN SUSC ISLT: ABNORMAL
LYMPHOCYTES # BLD AUTO: 1 10*3/UL (ref 0.8–4.8)
LYMPHOCYTES NFR BLD AUTO: 25 % (ref 18–42)
Lab: ABNORMAL
MCH RBC QN AUTO: 31.5 PG (ref 26–32)
MCHC RBC AUTO-ENTMCNC: 32.9 G/DL (ref 32–36)
MCV RBC AUTO: 95.8 FL (ref 80–94)
MEROPENEM SUSC ISLT: ABNORMAL
MICRO REPORT STATUS: ABNORMAL
MONOCYTES # BLD AUTO: 0.6 10*3/UL (ref 0.1–1.3)
MONOCYTES NFR BLD AUTO: 16 % (ref 2–11)
NEUTROPHILS # BLD AUTO: 2.1 10*3/UL (ref 2.3–8.6)
NEUTROPHILS NFR BLD AUTO: 55 % (ref 50–75)
NITRITE UR QL STRIP: POSITIVE
NITROFURANTOIN SUSC ISLT: ABNORMAL
NRBC BLD AUTO-RTO: 0 /100{WBCS}
NRBC/RBC NFR BLD MANUAL: 0 10*3/UL
PH UR STRIP.AUTO: 5.5 [PH] (ref 4.5–8)
PLATELET # BLD AUTO: 133 10*3/UL (ref 150–450)
PMV BLD AUTO: 7.7 FL (ref 7.4–10.4)
POTASSIUM SERPL-SCNC: 4 MMOL/L (ref 3.6–5.1)
PROT UR-MCNC: 30 MG/DL
PROT/CREAT UR: 0.1 MG/MG (ref 0–22)
RBC # BLD AUTO: 4.06 10*6/UL (ref 4–5.4)
RBC #/AREA URNS HPF: 8 /[HPF] (ref 0–3)
SODIUM SERPL-SCNC: 142 MMOL/L (ref 136–144)
SP GR UR: 1.03 (ref 1–1.03)
SPECIMEN SOURCE: ABNORMAL
SPERM URNS QL MICRO: ABNORMAL /[HPF]
SQUAMOUS SPT QL MICRO: 7 /[HPF] (ref 0–5)
SUSC METH SPEC: ABNORMAL
TIGECYCLINE ISLT MIC: ABNORMAL
TOBRAMYCIN SUSC ISLT: ABNORMAL
TRIGL SERPL-MCNC: 134 MG/DL
TRIMETHOPRIM/SULFA: ABNORMAL
UNIDENT CRYS URNS QL MICRO: ABNORMAL /[HPF]
WBC # BLD AUTO: 3.8 10*3/UL (ref 4.5–11.5)
WBC #/AREA URNS HPF: ABNORMAL /[HPF] (ref 0–5)
YEAST SPEC QL WET PREP: ABNORMAL /[HPF]

## 2018-08-12 LAB — BKV DNA # UR NAA+PROBE: DETECTED

## 2018-09-12 ENCOUNTER — HOSPITAL ENCOUNTER (OUTPATIENT)
Dept: LAB | Facility: HOSPITAL | Age: 73
Setting detail: SPECIMEN
Discharge: HOME | End: 2018-09-12
Attending: TRANSPLANT SURGERY | Admitting: TRANSPLANT SURGERY

## 2018-09-12 LAB
ALBUMIN SERPL-MCNC: 3.7 G/DL (ref 3.5–4.8)
ALBUMIN/GLOB SERPL: 1.6 {RATIO} (ref 1–1.7)
ALP SERPL-CCNC: 47 IU/L (ref 32–91)
ALT SERPL-CCNC: 11 IU/L (ref 14–54)
ANION GAP SERPL CALC-SCNC: 11 MMOL/L (ref 10–20)
AST SERPL-CCNC: 16 IU/L (ref 15–41)
BASOPHILS # BLD AUTO: 0.1 10*3/UL (ref 0–0.2)
BASOPHILS NFR BLD AUTO: 1 % (ref 0–2)
BILIRUB SERPL-MCNC: 0.7 MG/DL (ref 0.3–1.2)
BUN SERPL-MCNC: 10 MG/DL (ref 8–20)
BUN/CREAT SERPL: 12.5 (ref 5.4–26.2)
CALCIUM SERPL-MCNC: 10.1 MG/DL (ref 8.9–10.3)
CHLORIDE SERPL-SCNC: 108 MMOL/L (ref 101–111)
CONV CO2: 28 MMOL/L (ref 22–32)
CONV TOTAL PROTEIN: 6 G/DL (ref 6.1–7.9)
CREAT 24H UR-MCNC: 227.9 MG/DL
CREAT UR-MCNC: 0.8 MG/DL (ref 0.4–1)
DIFFERENTIAL METHOD BLD: (no result)
EOSINOPHIL # BLD AUTO: 0.1 10*3/UL (ref 0–0.3)
EOSINOPHIL # BLD AUTO: 2 % (ref 0–3)
ERYTHROCYTE [DISTWIDTH] IN BLOOD BY AUTOMATED COUNT: 15.4 % (ref 11.5–14.5)
GLOBULIN UR ELPH-MCNC: 2.3 G/DL (ref 2.5–3.8)
GLUCOSE SERPL-MCNC: 78 MG/DL (ref 65–99)
HCT VFR BLD AUTO: 38.6 % (ref 35–49)
HGB BLD-MCNC: 12.8 G/DL (ref 12–15)
LYMPHOCYTES # BLD AUTO: 0.9 10*3/UL (ref 0.8–4.8)
LYMPHOCYTES NFR BLD AUTO: 18 % (ref 18–42)
MCH RBC QN AUTO: 32.1 PG (ref 26–32)
MCHC RBC AUTO-ENTMCNC: 33.2 G/DL (ref 32–36)
MCV RBC AUTO: 96.6 FL (ref 80–94)
MONOCYTES # BLD AUTO: 0.7 10*3/UL (ref 0.1–1.3)
MONOCYTES NFR BLD AUTO: 14 % (ref 2–11)
NEUTROPHILS # BLD AUTO: 3.2 10*3/UL (ref 2.3–8.6)
NEUTROPHILS NFR BLD AUTO: 65 % (ref 50–75)
NRBC BLD AUTO-RTO: 0 /100{WBCS}
NRBC/RBC NFR BLD MANUAL: 0 10*3/UL
PLATELET # BLD AUTO: 137 10*3/UL (ref 150–450)
PMV BLD AUTO: 7.5 FL (ref 7.4–10.4)
POTASSIUM SERPL-SCNC: 4 MMOL/L (ref 3.6–5.1)
PROT UR-MCNC: 19 MG/DL
PROT/CREAT UR: 0.1 MG/MG (ref 0–22)
RBC # BLD AUTO: 4 10*6/UL (ref 4–5.4)
SODIUM SERPL-SCNC: 143 MMOL/L (ref 136–144)
WBC # BLD AUTO: 5 10*3/UL (ref 4.5–11.5)

## 2018-09-15 LAB — BKV DNA # UR NAA+PROBE: DETECTED

## 2018-09-21 ENCOUNTER — HOSPITAL ENCOUNTER (OUTPATIENT)
Dept: LAB | Facility: HOSPITAL | Age: 73
Discharge: HOME | End: 2018-09-21
Attending: TRANSPLANT SURGERY | Admitting: TRANSPLANT SURGERY

## 2018-09-24 LAB — BKV DNA # SERPL NAA+PROBE: DETECTED

## 2018-10-10 ENCOUNTER — HOSPITAL ENCOUNTER (OUTPATIENT)
Dept: LAB | Facility: HOSPITAL | Age: 73
Setting detail: SPECIMEN
Discharge: HOME | End: 2018-10-10
Attending: TRANSPLANT SURGERY | Admitting: TRANSPLANT SURGERY

## 2018-10-10 LAB
ALBUMIN SERPL-MCNC: 3.9 G/DL (ref 3.5–4.8)
ALBUMIN/GLOB SERPL: 2 {RATIO} (ref 1–1.7)
ALP SERPL-CCNC: 43 IU/L (ref 32–91)
ALT SERPL-CCNC: 12 IU/L (ref 14–54)
ANION GAP SERPL CALC-SCNC: 11.9 MMOL/L (ref 10–20)
AST SERPL-CCNC: 17 IU/L (ref 15–41)
BASOPHILS # BLD AUTO: 0 10*3/UL (ref 0–0.2)
BASOPHILS NFR BLD AUTO: 1 % (ref 0–2)
BILIRUB SERPL-MCNC: 1 MG/DL (ref 0.3–1.2)
BUN SERPL-MCNC: 12 MG/DL (ref 8–20)
BUN/CREAT SERPL: 13.3 (ref 5.4–26.2)
CALCIUM SERPL-MCNC: 9.9 MG/DL (ref 8.9–10.3)
CHLORIDE SERPL-SCNC: 107 MMOL/L (ref 101–111)
CONV CO2: 27 MMOL/L (ref 22–32)
CONV TOTAL PROTEIN: 5.9 G/DL (ref 6.1–7.9)
CREAT 24H UR-MCNC: 209.3 MG/DL
CREAT UR-MCNC: 0.9 MG/DL (ref 0.4–1)
DIFFERENTIAL METHOD BLD: (no result)
EOSINOPHIL # BLD AUTO: 0.1 10*3/UL (ref 0–0.3)
EOSINOPHIL # BLD AUTO: 2 % (ref 0–3)
ERYTHROCYTE [DISTWIDTH] IN BLOOD BY AUTOMATED COUNT: 15.2 % (ref 11.5–14.5)
GLOBULIN UR ELPH-MCNC: 2 G/DL (ref 2.5–3.8)
GLUCOSE SERPL-MCNC: 77 MG/DL (ref 65–99)
HCT VFR BLD AUTO: 38.4 % (ref 35–49)
HGB BLD-MCNC: 12.8 G/DL (ref 12–15)
LYMPHOCYTES # BLD AUTO: 0.9 10*3/UL (ref 0.8–4.8)
LYMPHOCYTES NFR BLD AUTO: 21 % (ref 18–42)
MCH RBC QN AUTO: 33 PG (ref 26–32)
MCHC RBC AUTO-ENTMCNC: 33.3 G/DL (ref 32–36)
MCV RBC AUTO: 99.2 FL (ref 80–94)
MONOCYTES # BLD AUTO: 0.6 10*3/UL (ref 0.1–1.3)
MONOCYTES NFR BLD AUTO: 14 % (ref 2–11)
NEUTROPHILS # BLD AUTO: 2.7 10*3/UL (ref 2.3–8.6)
NEUTROPHILS NFR BLD AUTO: 62 % (ref 50–75)
NRBC BLD AUTO-RTO: 0 /100{WBCS}
NRBC/RBC NFR BLD MANUAL: 0 10*3/UL
PLATELET # BLD AUTO: 131 10*3/UL (ref 150–450)
PMV BLD AUTO: 7.6 FL (ref 7.4–10.4)
POTASSIUM SERPL-SCNC: 3.9 MMOL/L (ref 3.6–5.1)
PROT UR-MCNC: 17 MG/DL
PROT/CREAT UR: 0.1 MG/MG (ref 0–22)
RBC # BLD AUTO: 3.87 10*6/UL (ref 4–5.4)
SODIUM SERPL-SCNC: 142 MMOL/L (ref 136–144)
WBC # BLD AUTO: 4.3 10*3/UL (ref 4.5–11.5)

## 2018-10-11 LAB — BKV DNA # SERPL NAA+PROBE: DETECTED

## 2018-10-13 LAB — BKV DNA # UR NAA+PROBE: DETECTED

## 2018-11-28 ENCOUNTER — HOSPITAL ENCOUNTER (OUTPATIENT)
Dept: LAB | Facility: HOSPITAL | Age: 73
Setting detail: SPECIMEN
Discharge: HOME | End: 2018-11-28
Attending: TRANSPLANT SURGERY | Admitting: TRANSPLANT SURGERY

## 2018-11-28 LAB
ALBUMIN SERPL-MCNC: 3.9 G/DL (ref 3.5–4.8)
ALBUMIN/GLOB SERPL: 1.7 {RATIO} (ref 1–1.7)
ALP SERPL-CCNC: 43 IU/L (ref 32–91)
ALT SERPL-CCNC: 9 IU/L (ref 14–54)
ANION GAP SERPL CALC-SCNC: 13.3 MMOL/L (ref 10–20)
AST SERPL-CCNC: 15 IU/L (ref 15–41)
BASOPHILS # BLD AUTO: 0 10*3/UL (ref 0–0.2)
BASOPHILS NFR BLD AUTO: 1 % (ref 0–2)
BILIRUB SERPL-MCNC: 0.9 MG/DL (ref 0.3–1.2)
BUN SERPL-MCNC: 12 MG/DL (ref 8–20)
BUN/CREAT SERPL: 12 (ref 5.4–26.2)
CALCIUM SERPL-MCNC: 10.2 MG/DL (ref 8.9–10.3)
CHLORIDE SERPL-SCNC: 107 MMOL/L (ref 101–111)
CHOLEST SERPL-MCNC: 145 MG/DL
CONV CO2: 27 MMOL/L (ref 22–32)
CONV TOTAL PROTEIN: 6.2 G/DL (ref 6.1–7.9)
CREAT 24H UR-MCNC: 296.1 MG/DL
CREAT UR-MCNC: 1 MG/DL (ref 0.4–1)
DIFFERENTIAL METHOD BLD: (no result)
EOSINOPHIL # BLD AUTO: 0.1 10*3/UL (ref 0–0.3)
EOSINOPHIL # BLD AUTO: 1 % (ref 0–3)
ERYTHROCYTE [DISTWIDTH] IN BLOOD BY AUTOMATED COUNT: 13 % (ref 11.5–14.5)
GLOBULIN UR ELPH-MCNC: 2.3 G/DL (ref 2.5–3.8)
GLUCOSE SERPL-MCNC: 89 MG/DL (ref 65–99)
HCT VFR BLD AUTO: 39.3 % (ref 35–49)
HGB BLD-MCNC: 13.4 G/DL (ref 12–15)
LYMPHOCYTES # BLD AUTO: 1.1 10*3/UL (ref 0.8–4.8)
LYMPHOCYTES NFR BLD AUTO: 19 % (ref 18–42)
MCH RBC QN AUTO: 33 PG (ref 26–32)
MCHC RBC AUTO-ENTMCNC: 34 G/DL (ref 32–36)
MCV RBC AUTO: 96.9 FL (ref 80–94)
MONOCYTES # BLD AUTO: 0.8 10*3/UL (ref 0.1–1.3)
MONOCYTES NFR BLD AUTO: 13 % (ref 2–11)
NEUTROPHILS # BLD AUTO: 4 10*3/UL (ref 2.3–8.6)
NEUTROPHILS NFR BLD AUTO: 66 % (ref 50–75)
NRBC BLD AUTO-RTO: 0 /100{WBCS}
NRBC/RBC NFR BLD MANUAL: 0 10*3/UL
PLATELET # BLD AUTO: 123 10*3/UL (ref 150–450)
PMV BLD AUTO: 8 FL (ref 7.4–10.4)
POTASSIUM SERPL-SCNC: 4.3 MMOL/L (ref 3.6–5.1)
PROT UR-MCNC: 26 MG/DL
PROT/CREAT UR: 0.1 MG/MG (ref 0–22)
RBC # BLD AUTO: 4.05 10*6/UL (ref 4–5.4)
SODIUM SERPL-SCNC: 143 MMOL/L (ref 136–144)
TRIGL SERPL-MCNC: 117 MG/DL
WBC # BLD AUTO: 6 10*3/UL (ref 4.5–11.5)

## 2018-11-30 LAB — BKV DNA # SERPL NAA+PROBE: DETECTED

## 2018-12-01 LAB — BKV DNA # UR NAA+PROBE: DETECTED

## 2019-01-02 ENCOUNTER — HOSPITAL ENCOUNTER (OUTPATIENT)
Dept: LAB | Facility: HOSPITAL | Age: 74
Setting detail: SPECIMEN
Discharge: HOME | End: 2019-01-02
Attending: TRANSPLANT SURGERY | Admitting: TRANSPLANT SURGERY

## 2019-01-02 LAB
ALBUMIN SERPL-MCNC: 3.5 G/DL (ref 3.5–4.8)
ALBUMIN/GLOB SERPL: 1.3 {RATIO} (ref 1–1.7)
ALP SERPL-CCNC: 44 IU/L (ref 32–91)
ALT SERPL-CCNC: 9 IU/L (ref 14–54)
ANION GAP SERPL CALC-SCNC: 12.6 MMOL/L (ref 10–20)
AST SERPL-CCNC: 18 IU/L (ref 15–41)
BASOPHILS # BLD AUTO: 0 10*3/UL (ref 0–0.2)
BASOPHILS NFR BLD AUTO: 1 % (ref 0–2)
BILIRUB SERPL-MCNC: 0.8 MG/DL (ref 0.3–1.2)
BUN SERPL-MCNC: 9 MG/DL (ref 8–20)
BUN/CREAT SERPL: 11.3 (ref 5.4–26.2)
CALCIUM SERPL-MCNC: 10 MG/DL (ref 8.9–10.3)
CHLORIDE SERPL-SCNC: 105 MMOL/L (ref 101–111)
CONV CO2: 26 MMOL/L (ref 22–32)
CONV TOTAL PROTEIN: 6.3 G/DL (ref 6.1–7.9)
CREAT 24H UR-MCNC: 296.8 MG/DL
CREAT UR-MCNC: 0.8 MG/DL (ref 0.4–1)
DIFFERENTIAL METHOD BLD: (no result)
EOSINOPHIL # BLD AUTO: 0.1 10*3/UL (ref 0–0.3)
EOSINOPHIL # BLD AUTO: 1 % (ref 0–3)
ERYTHROCYTE [DISTWIDTH] IN BLOOD BY AUTOMATED COUNT: 13.2 % (ref 11.5–14.5)
GLOBULIN UR ELPH-MCNC: 2.8 G/DL (ref 2.5–3.8)
GLUCOSE SERPL-MCNC: 95 MG/DL (ref 65–99)
HCT VFR BLD AUTO: 39.8 % (ref 35–49)
HGB BLD-MCNC: 12.7 G/DL (ref 12–15)
LYMPHOCYTES # BLD AUTO: 1.2 10*3/UL (ref 0.8–4.8)
LYMPHOCYTES NFR BLD AUTO: 14 % (ref 18–42)
MCH RBC QN AUTO: 31.2 PG (ref 26–32)
MCHC RBC AUTO-ENTMCNC: 32 G/DL (ref 32–36)
MCV RBC AUTO: 97.5 FL (ref 80–94)
MONOCYTES # BLD AUTO: 0.7 10*3/UL (ref 0.1–1.3)
MONOCYTES NFR BLD AUTO: 9 % (ref 2–11)
NEUTROPHILS # BLD AUTO: 6 10*3/UL (ref 2.3–8.6)
NEUTROPHILS NFR BLD AUTO: 75 % (ref 50–75)
NRBC BLD AUTO-RTO: 0 /100{WBCS}
NRBC/RBC NFR BLD MANUAL: 0 10*3/UL
PLATELET # BLD AUTO: 162 10*3/UL (ref 150–450)
PMV BLD AUTO: 7.3 FL (ref 7.4–10.4)
POTASSIUM SERPL-SCNC: 3.6 MMOL/L (ref 3.6–5.1)
PROT UR-MCNC: 29 MG/DL
PROT/CREAT UR: 0.1 MG/MG (ref 0–22)
RBC # BLD AUTO: 4.08 10*6/UL (ref 4–5.4)
SODIUM SERPL-SCNC: 140 MMOL/L (ref 136–144)
WBC # BLD AUTO: 8.1 10*3/UL (ref 4.5–11.5)

## 2019-01-03 LAB — BKV DNA # UR NAA+PROBE: DETECTED

## 2019-01-14 ENCOUNTER — HOSPITAL ENCOUNTER (OUTPATIENT)
Dept: LAB | Facility: HOSPITAL | Age: 74
Setting detail: SPECIMEN
Discharge: HOME | End: 2019-01-14
Attending: INTERNAL MEDICINE | Admitting: INTERNAL MEDICINE

## 2019-01-14 LAB
BACTERIA ISLT: ABNORMAL
BACTERIA SPEC AEROBE CULT: ABNORMAL
BILIRUB UR QL STRIP: NEGATIVE MG/DL
CASTS URNS QL MICRO: ABNORMAL /[LPF]
COLONY COUNT: ABNORMAL
COLOR UR: ABNORMAL
CONV BACTERIA IN URINE MICRO: ABNORMAL
CONV CLARITY OF URINE: ABNORMAL
CONV HYALINE CASTS IN URINE MICRO: 2 /[LPF] (ref 0–5)
CONV PROTEIN IN URINE BY AUTOMATED TEST STRIP: NEGATIVE MG/DL
CONV SMALL ROUND CELLS: ABNORMAL /[HPF]
CONV UROBILINOGEN IN URINE BY AUTOMATED TEST STRIP: 1 MG/DL
CREAT 24H UR-MCNC: 221.9 MG/DL
CULTURE INDICATED?: ABNORMAL
GLUCOSE UR QL: NEGATIVE MG/DL
HGB UR QL STRIP: NEGATIVE
KETONES UR QL STRIP: ABNORMAL MG/DL
LEUKOCYTE ESTERASE UR QL STRIP: ABNORMAL
LEVOFLOXACIN SUSC ISLT: ABNORMAL
Lab: ABNORMAL
MEROPENEM SUSC ISLT: ABNORMAL
MICRO REPORT STATUS: ABNORMAL
NITRITE UR QL STRIP: POSITIVE
NITROFURANTOIN SUSC ISLT: ABNORMAL
PH UR STRIP.AUTO: 5.5 [PH] (ref 4.5–8)
PROT UR-MCNC: 18 MG/DL
PROT/CREAT UR: 0.1 MG/MG (ref 0–22)
RBC #/AREA URNS HPF: 2 /[HPF] (ref 0–3)
SP GR UR: 1.02 (ref 1–1.03)
SPECIMEN SOURCE: ABNORMAL
SPERM URNS QL MICRO: ABNORMAL /[HPF]
SQUAMOUS SPT QL MICRO: 3 /[HPF] (ref 0–5)
SUSC METH SPEC: ABNORMAL
TIGECYCLINE ISLT MIC: ABNORMAL
TOBRAMYCIN SUSC ISLT: ABNORMAL
TRIMETHOPRIM/SULFA: ABNORMAL
UNIDENT CRYS URNS QL MICRO: ABNORMAL /[HPF]
WBC #/AREA URNS HPF: ABNORMAL /[HPF] (ref 0–5)
YEAST SPEC QL WET PREP: ABNORMAL /[HPF]

## 2019-02-13 ENCOUNTER — HOSPITAL ENCOUNTER (OUTPATIENT)
Dept: LAB | Facility: HOSPITAL | Age: 74
Setting detail: SPECIMEN
Discharge: HOME | End: 2019-02-13
Attending: TRANSPLANT SURGERY | Admitting: TRANSPLANT SURGERY

## 2019-02-13 LAB
ALBUMIN SERPL-MCNC: 4 G/DL (ref 3.5–4.8)
ALBUMIN/GLOB SERPL: 2.1 {RATIO} (ref 1–1.7)
ALP SERPL-CCNC: 36 IU/L (ref 32–91)
ALT SERPL-CCNC: 10 IU/L (ref 14–54)
ANION GAP SERPL CALC-SCNC: 11.6 MMOL/L (ref 10–20)
AST SERPL-CCNC: 17 IU/L (ref 15–41)
BASOPHILS # BLD AUTO: 0 10*3/UL (ref 0–0.2)
BASOPHILS NFR BLD AUTO: 1 % (ref 0–2)
BILIRUB SERPL-MCNC: 0.9 MG/DL (ref 0.3–1.2)
BUN SERPL-MCNC: 9 MG/DL (ref 8–20)
BUN/CREAT SERPL: 11.3 (ref 5.4–26.2)
CALCIUM SERPL-MCNC: 10 MG/DL (ref 8.9–10.3)
CHLORIDE SERPL-SCNC: 106 MMOL/L (ref 101–111)
CHOLEST SERPL-MCNC: 160 MG/DL
CONV CO2: 27 MMOL/L (ref 22–32)
CONV TOTAL PROTEIN: 5.9 G/DL (ref 6.1–7.9)
CREAT 24H UR-MCNC: 320 MG/DL
CREAT UR-MCNC: 0.8 MG/DL (ref 0.4–1)
DIFFERENTIAL METHOD BLD: (no result)
EOSINOPHIL # BLD AUTO: 0.1 10*3/UL (ref 0–0.3)
EOSINOPHIL # BLD AUTO: 1 % (ref 0–3)
ERYTHROCYTE [DISTWIDTH] IN BLOOD BY AUTOMATED COUNT: 14.3 % (ref 11.5–14.5)
GLOBULIN UR ELPH-MCNC: 1.9 G/DL (ref 2.5–3.8)
GLUCOSE SERPL-MCNC: 79 MG/DL (ref 65–99)
HCT VFR BLD AUTO: 38.7 % (ref 35–49)
HGB BLD-MCNC: 13 G/DL (ref 12–15)
LYMPHOCYTES # BLD AUTO: 0.9 10*3/UL (ref 0.8–4.8)
LYMPHOCYTES NFR BLD AUTO: 24 % (ref 18–42)
MCH RBC QN AUTO: 33.3 PG (ref 26–32)
MCHC RBC AUTO-ENTMCNC: 33.7 G/DL (ref 32–36)
MCV RBC AUTO: 98.9 FL (ref 80–94)
MONOCYTES # BLD AUTO: 0.5 10*3/UL (ref 0.1–1.3)
MONOCYTES NFR BLD AUTO: 13 % (ref 2–11)
NEUTROPHILS # BLD AUTO: 2.4 10*3/UL (ref 2.3–8.6)
NEUTROPHILS NFR BLD AUTO: 61 % (ref 50–75)
NRBC BLD AUTO-RTO: 0 /100{WBCS}
NRBC/RBC NFR BLD MANUAL: 0 10*3/UL
PLATELET # BLD AUTO: 120 10*3/UL (ref 150–450)
PMV BLD AUTO: 7.4 FL (ref 7.4–10.4)
POTASSIUM SERPL-SCNC: 3.6 MMOL/L (ref 3.6–5.1)
PROT UR-MCNC: 21 MG/DL
PROT/CREAT UR: 0.1 MG/MG (ref 0–22)
RBC # BLD AUTO: 3.92 10*6/UL (ref 4–5.4)
SODIUM SERPL-SCNC: 141 MMOL/L (ref 136–144)
TRIGL SERPL-MCNC: 128 MG/DL
WBC # BLD AUTO: 4 10*3/UL (ref 4.5–11.5)

## 2019-02-15 LAB — BKV DNA # UR NAA+PROBE: DETECTED

## 2019-03-11 ENCOUNTER — HOSPITAL ENCOUNTER (OUTPATIENT)
Dept: LAB | Facility: HOSPITAL | Age: 74
Setting detail: SPECIMEN
Discharge: HOME | End: 2019-03-11
Attending: TRANSPLANT SURGERY | Admitting: TRANSPLANT SURGERY

## 2019-03-11 LAB
ALBUMIN SERPL-MCNC: 4 G/DL (ref 3.5–4.8)
ALBUMIN/GLOB SERPL: 1.7 {RATIO} (ref 1–1.7)
ALP SERPL-CCNC: 41 IU/L (ref 32–91)
ALT SERPL-CCNC: 9 IU/L (ref 14–54)
ANION GAP SERPL CALC-SCNC: 12.9 MMOL/L (ref 10–20)
AST SERPL-CCNC: 16 IU/L (ref 15–41)
BASOPHILS # BLD AUTO: 0 10*3/UL (ref 0–0.2)
BASOPHILS NFR BLD AUTO: 0 % (ref 0–2)
BILIRUB SERPL-MCNC: 1.1 MG/DL (ref 0.3–1.2)
BUN SERPL-MCNC: 13 MG/DL (ref 8–20)
BUN/CREAT SERPL: 16.3 (ref 5.4–26.2)
CALCIUM SERPL-MCNC: 10.3 MG/DL (ref 8.9–10.3)
CHLORIDE SERPL-SCNC: 106 MMOL/L (ref 101–111)
CONV CO2: 25 MMOL/L (ref 22–32)
CONV TOTAL PROTEIN: 6.3 G/DL (ref 6.1–7.9)
CREAT 24H UR-MCNC: 347.3 MG/DL
CREAT UR-MCNC: 0.8 MG/DL (ref 0.4–1)
DIFFERENTIAL METHOD BLD: (no result)
EOSINOPHIL # BLD AUTO: 0.1 10*3/UL (ref 0–0.3)
EOSINOPHIL # BLD AUTO: 1 % (ref 0–3)
ERYTHROCYTE [DISTWIDTH] IN BLOOD BY AUTOMATED COUNT: 13.9 % (ref 11.5–14.5)
GLOBULIN UR ELPH-MCNC: 2.3 G/DL (ref 2.5–3.8)
GLUCOSE SERPL-MCNC: 100 MG/DL (ref 65–99)
HCT VFR BLD AUTO: 39.9 % (ref 35–49)
HGB BLD-MCNC: 13.5 G/DL (ref 12–15)
LYMPHOCYTES # BLD AUTO: 1 10*3/UL (ref 0.8–4.8)
LYMPHOCYTES NFR BLD AUTO: 18 % (ref 18–42)
MCH RBC QN AUTO: 33.3 PG (ref 26–32)
MCHC RBC AUTO-ENTMCNC: 33.8 G/DL (ref 32–36)
MCV RBC AUTO: 98.6 FL (ref 80–94)
MONOCYTES # BLD AUTO: 0.6 10*3/UL (ref 0.1–1.3)
MONOCYTES NFR BLD AUTO: 12 % (ref 2–11)
NEUTROPHILS # BLD AUTO: 3.6 10*3/UL (ref 2.3–8.6)
NEUTROPHILS NFR BLD AUTO: 69 % (ref 50–75)
NRBC BLD AUTO-RTO: 0 /100{WBCS}
NRBC/RBC NFR BLD MANUAL: 0 10*3/UL
PLATELET # BLD AUTO: 126 10*3/UL (ref 150–450)
PMV BLD AUTO: 7.6 FL (ref 7.4–10.4)
POTASSIUM SERPL-SCNC: 3.9 MMOL/L (ref 3.6–5.1)
PROT UR-MCNC: 22 MG/DL
PROT/CREAT UR: 0.1 MG/MG (ref 0–22)
RBC # BLD AUTO: 4.05 10*6/UL (ref 4–5.4)
SODIUM SERPL-SCNC: 140 MMOL/L (ref 136–144)
WBC # BLD AUTO: 5.3 10*3/UL (ref 4.5–11.5)

## 2019-03-13 LAB — BKV DNA # UR NAA+PROBE: DETECTED

## 2019-04-10 ENCOUNTER — HOSPITAL ENCOUNTER (OUTPATIENT)
Dept: LAB | Facility: HOSPITAL | Age: 74
Setting detail: SPECIMEN
Discharge: HOME | End: 2019-04-10
Attending: TRANSPLANT SURGERY | Admitting: TRANSPLANT SURGERY

## 2019-04-10 LAB
ALBUMIN SERPL-MCNC: 4 G/DL (ref 3.5–4.8)
ALBUMIN/GLOB SERPL: 1.7 {RATIO} (ref 1–1.7)
ALP SERPL-CCNC: 39 IU/L (ref 32–91)
ALT SERPL-CCNC: 11 IU/L (ref 14–54)
ANION GAP SERPL CALC-SCNC: 15.7 MMOL/L (ref 10–20)
AST SERPL-CCNC: 16 IU/L (ref 15–41)
BASOPHILS # BLD AUTO: 0 10*3/UL (ref 0–0.2)
BASOPHILS NFR BLD AUTO: 1 % (ref 0–2)
BILIRUB SERPL-MCNC: 1 MG/DL (ref 0.3–1.2)
BUN SERPL-MCNC: 13 MG/DL (ref 8–20)
BUN/CREAT SERPL: 16.3 (ref 5.4–26.2)
CALCIUM SERPL-MCNC: 10.2 MG/DL (ref 8.9–10.3)
CHLORIDE SERPL-SCNC: 107 MMOL/L (ref 101–111)
CONV CO2: 23 MMOL/L (ref 22–32)
CONV TOTAL PROTEIN: 6.3 G/DL (ref 6.1–7.9)
CREAT 24H UR-MCNC: 217.3 MG/DL
CREAT UR-MCNC: 0.8 MG/DL (ref 0.4–1)
DIFFERENTIAL METHOD BLD: (no result)
EOSINOPHIL # BLD AUTO: 0.1 10*3/UL (ref 0–0.3)
EOSINOPHIL # BLD AUTO: 1 % (ref 0–3)
ERYTHROCYTE [DISTWIDTH] IN BLOOD BY AUTOMATED COUNT: 14.1 % (ref 11.5–14.5)
GLOBULIN UR ELPH-MCNC: 2.3 G/DL (ref 2.5–3.8)
GLUCOSE SERPL-MCNC: 84 MG/DL (ref 65–99)
HCT VFR BLD AUTO: 40.1 % (ref 35–49)
HGB BLD-MCNC: 13.4 G/DL (ref 12–15)
LYMPHOCYTES # BLD AUTO: 0.9 10*3/UL (ref 0.8–4.8)
LYMPHOCYTES NFR BLD AUTO: 18 % (ref 18–42)
MCH RBC QN AUTO: 32.9 PG (ref 26–32)
MCHC RBC AUTO-ENTMCNC: 33.3 G/DL (ref 32–36)
MCV RBC AUTO: 98.7 FL (ref 80–94)
MONOCYTES # BLD AUTO: 0.6 10*3/UL (ref 0.1–1.3)
MONOCYTES NFR BLD AUTO: 13 % (ref 2–11)
NEUTROPHILS # BLD AUTO: 3.4 10*3/UL (ref 2.3–8.6)
NEUTROPHILS NFR BLD AUTO: 67 % (ref 50–75)
NRBC BLD AUTO-RTO: 0 /100{WBCS}
NRBC/RBC NFR BLD MANUAL: 0 10*3/UL
PLATELET # BLD AUTO: 135 10*3/UL (ref 150–450)
PMV BLD AUTO: 7.5 FL (ref 7.4–10.4)
POTASSIUM SERPL-SCNC: 3.7 MMOL/L (ref 3.6–5.1)
PROT UR-MCNC: 23 MG/DL
PROT/CREAT UR: 0.1 MG/MG (ref 0–22)
RBC # BLD AUTO: 4.07 10*6/UL (ref 4–5.4)
SODIUM SERPL-SCNC: 142 MMOL/L (ref 136–144)
WBC # BLD AUTO: 5.1 10*3/UL (ref 4.5–11.5)

## 2019-04-13 LAB — BKV DNA # UR NAA+PROBE: DETECTED

## 2019-05-15 ENCOUNTER — HOSPITAL ENCOUNTER (OUTPATIENT)
Dept: LAB | Facility: HOSPITAL | Age: 74
Setting detail: SPECIMEN
Discharge: HOME | End: 2019-05-15
Attending: TRANSPLANT SURGERY | Admitting: TRANSPLANT SURGERY

## 2019-05-15 LAB
ALBUMIN SERPL-MCNC: 3.9 G/DL (ref 3.5–4.8)
ALBUMIN/GLOB SERPL: 1.8 {RATIO} (ref 1–1.7)
ALP SERPL-CCNC: 37 IU/L (ref 32–91)
ALT SERPL-CCNC: 11 IU/L (ref 14–54)
ANION GAP SERPL CALC-SCNC: 12.1 MMOL/L (ref 10–20)
AST SERPL-CCNC: 14 IU/L (ref 15–41)
BASOPHILS # BLD AUTO: 0 10*3/UL (ref 0–0.2)
BASOPHILS NFR BLD AUTO: 1 % (ref 0–2)
BILIRUB SERPL-MCNC: 0.9 MG/DL (ref 0.3–1.2)
BUN SERPL-MCNC: 11 MG/DL (ref 8–20)
BUN/CREAT SERPL: 15.7 (ref 5.4–26.2)
CALCIUM SERPL-MCNC: 10.2 MG/DL (ref 8.9–10.3)
CHLORIDE SERPL-SCNC: 108 MMOL/L (ref 101–111)
CONV CO2: 26 MMOL/L (ref 22–32)
CONV TOTAL PROTEIN: 6.1 G/DL (ref 6.1–7.9)
CREAT 24H UR-MCNC: 181.4 MG/DL
CREAT UR-MCNC: 0.7 MG/DL (ref 0.4–1)
DIFFERENTIAL METHOD BLD: (no result)
EOSINOPHIL # BLD AUTO: 0.1 10*3/UL (ref 0–0.3)
EOSINOPHIL # BLD AUTO: 1 % (ref 0–3)
ERYTHROCYTE [DISTWIDTH] IN BLOOD BY AUTOMATED COUNT: 13.9 % (ref 11.5–14.5)
GLOBULIN UR ELPH-MCNC: 2.2 G/DL (ref 2.5–3.8)
GLUCOSE SERPL-MCNC: 81 MG/DL (ref 65–99)
HCT VFR BLD AUTO: 40.2 % (ref 35–49)
HGB BLD-MCNC: 13.5 G/DL (ref 12–15)
LYMPHOCYTES # BLD AUTO: 1 10*3/UL (ref 0.8–4.8)
LYMPHOCYTES NFR BLD AUTO: 23 % (ref 18–42)
MCH RBC QN AUTO: 33.5 PG (ref 26–32)
MCHC RBC AUTO-ENTMCNC: 33.6 G/DL (ref 32–36)
MCV RBC AUTO: 99.6 FL (ref 80–94)
MONOCYTES # BLD AUTO: 0.5 10*3/UL (ref 0.1–1.3)
MONOCYTES NFR BLD AUTO: 13 % (ref 2–11)
NEUTROPHILS # BLD AUTO: 2.7 10*3/UL (ref 2.3–8.6)
NEUTROPHILS NFR BLD AUTO: 62 % (ref 50–75)
NRBC BLD AUTO-RTO: 0 /100{WBCS}
NRBC/RBC NFR BLD MANUAL: 0 10*3/UL
PLATELET # BLD AUTO: 126 10*3/UL (ref 150–450)
PMV BLD AUTO: 7.2 FL (ref 7.4–10.4)
POTASSIUM SERPL-SCNC: 4.1 MMOL/L (ref 3.6–5.1)
PROT UR-MCNC: 15 MG/DL
PROT/CREAT UR: 0.1 MG/MG (ref 0–22)
RBC # BLD AUTO: 4.04 10*6/UL (ref 4–5.4)
SODIUM SERPL-SCNC: 142 MMOL/L (ref 136–144)
WBC # BLD AUTO: 4.3 10*3/UL (ref 4.5–11.5)

## 2019-05-16 LAB
BKV DNA # SERPL NAA+PROBE: NORMAL
BKV DNA # UR NAA+PROBE: NORMAL
TACROLIMUS BLD-MCNC: 6.5 NG/ML

## 2019-05-17 LAB
BKV DNA # UR NAA+PROBE: DETECTED
HCV RNA # SERPL NAA+PROBE: NORMAL COPIES/ML

## 2019-06-19 ENCOUNTER — TRANSCRIBE ORDERS (OUTPATIENT)
Dept: LAB | Facility: HOSPITAL | Age: 74
End: 2019-06-19

## 2019-06-19 ENCOUNTER — LAB (OUTPATIENT)
Dept: LAB | Facility: HOSPITAL | Age: 74
End: 2019-06-19

## 2019-06-19 DIAGNOSIS — Z94.0 KIDNEY REPLACED BY TRANSPLANT: Primary | ICD-10-CM

## 2019-06-19 DIAGNOSIS — Z79.890 NEED FOR PROPHYLACTIC HORMONE REPLACEMENT THERAPY (POSTMENOPAUSAL): ICD-10-CM

## 2019-06-19 DIAGNOSIS — Z00.00 ROUTINE GENERAL MEDICAL EXAMINATION AT A HEALTH CARE FACILITY: ICD-10-CM

## 2019-06-19 DIAGNOSIS — Z94.89 TRANSPLANT RECIPIENT: Primary | ICD-10-CM

## 2019-06-19 DIAGNOSIS — Z94.0 KIDNEY REPLACED BY TRANSPLANT: ICD-10-CM

## 2019-06-19 LAB
ALBUMIN SERPL-MCNC: 3.9 G/DL (ref 3.5–4.8)
ALBUMIN/GLOB SERPL: 1.7 G/DL (ref 1–1.7)
ALP SERPL-CCNC: 36 U/L (ref 32–91)
ALT SERPL W P-5'-P-CCNC: 10 U/L (ref 14–54)
ANION GAP SERPL CALCULATED.3IONS-SCNC: 11 MMOL/L (ref 10–20)
AST SERPL-CCNC: 13 U/L (ref 15–41)
BASOPHILS # BLD AUTO: 0 10*3/MM3 (ref 0–0.2)
BASOPHILS NFR BLD AUTO: 0.5 % (ref 0–1.5)
BILIRUB SERPL-MCNC: 0.9 MG/DL (ref 0.3–1.2)
BUN BLD-MCNC: 12 MG/DL (ref 8–20)
BUN/CREAT SERPL: 15 (ref 5.4–26.2)
CALCIUM SPEC-SCNC: 10.2 MG/DL (ref 8.9–10.3)
CHLORIDE SERPL-SCNC: 107 MMOL/L (ref 101–111)
CO2 SERPL-SCNC: 25 MMOL/L (ref 22–32)
CREAT BLD-MCNC: 0.8 MG/DL (ref 0.4–1)
CREAT UR-MCNC: 183.8 MG/DL
DEPRECATED RDW RBC AUTO: 46.8 FL (ref 37–54)
EOSINOPHIL # BLD AUTO: 0.1 10*3/MM3 (ref 0–0.4)
EOSINOPHIL NFR BLD AUTO: 1.4 % (ref 0.3–6.2)
ERYTHROCYTE [DISTWIDTH] IN BLOOD BY AUTOMATED COUNT: 13.5 % (ref 12.3–15.4)
GFR SERPL CREATININE-BSD FRML MDRD: 71 ML/MIN/1.73
GLOBULIN UR ELPH-MCNC: 2.3 GM/DL (ref 2.5–3.8)
GLUCOSE BLD-MCNC: 93 MG/DL (ref 65–99)
HCT VFR BLD AUTO: 39 % (ref 34–46.6)
HGB BLD-MCNC: 13 G/DL (ref 12–15.9)
LYMPHOCYTES # BLD AUTO: 0.9 10*3/MM3 (ref 0.7–3.1)
LYMPHOCYTES NFR BLD AUTO: 21.5 % (ref 19.6–45.3)
MCH RBC QN AUTO: 33 PG (ref 26.6–33)
MCHC RBC AUTO-ENTMCNC: 33.4 G/DL (ref 31.5–35.7)
MCV RBC AUTO: 99 FL (ref 79–97)
MONOCYTES # BLD AUTO: 0.6 10*3/MM3 (ref 0.1–0.9)
MONOCYTES NFR BLD AUTO: 14.7 % (ref 5–12)
NEUTROPHILS # BLD AUTO: 2.6 10*3/MM3 (ref 1.7–7)
NEUTROPHILS NFR BLD AUTO: 61.9 % (ref 42.7–76)
NRBC BLD AUTO-RTO: 0.1 /100 WBC (ref 0–0.2)
PLATELET # BLD AUTO: 113 10*3/MM3 (ref 140–450)
PMV BLD AUTO: 7.4 FL (ref 6–12)
POTASSIUM BLD-SCNC: 3.9 MMOL/L (ref 3.6–5.1)
PROT SERPL-MCNC: 6.2 G/DL (ref 6.1–7.9)
PROT UR-MCNC: 15 MG/DL
PROT/CREAT UR: 81.6 MG/G CREA (ref 0–22)
RBC # BLD AUTO: 3.94 10*6/MM3 (ref 3.77–5.28)
SODIUM BLD-SCNC: 143 MMOL/L (ref 136–144)
WBC NRBC COR # BLD: 4.2 10*3/MM3 (ref 3.4–10.8)

## 2019-06-19 PROCEDURE — 80053 COMPREHEN METABOLIC PANEL: CPT

## 2019-06-19 PROCEDURE — 85025 COMPLETE CBC W/AUTO DIFF WBC: CPT

## 2019-06-19 PROCEDURE — 87799 DETECT AGENT NOS DNA QUANT: CPT

## 2019-06-19 PROCEDURE — 36415 COLL VENOUS BLD VENIPUNCTURE: CPT

## 2019-06-19 PROCEDURE — 80197 ASSAY OF TACROLIMUS: CPT

## 2019-06-19 PROCEDURE — 82570 ASSAY OF URINE CREATININE: CPT

## 2019-06-19 PROCEDURE — 84156 ASSAY OF PROTEIN URINE: CPT

## 2019-06-21 LAB
BKV DNA # SERPL NAA+PROBE: NEGATIVE COPIES/ML
BKV DNA # UR NAA+PROBE: NORMAL COPIES/ML
LOG10 BK QN PCR UR: 5 LOG10COPY/ML
LOG10 BK QN PCR: NORMAL LOG10COPY/ML
TACROLIMUS BLD-MCNC: 7.7 NG/ML (ref 2–20)

## 2019-07-23 ENCOUNTER — TRANSCRIBE ORDERS (OUTPATIENT)
Dept: ADMINISTRATIVE | Facility: HOSPITAL | Age: 74
End: 2019-07-23

## 2019-07-23 ENCOUNTER — LAB (OUTPATIENT)
Dept: LAB | Facility: HOSPITAL | Age: 74
End: 2019-07-23

## 2019-07-23 DIAGNOSIS — Z79.891 LONG-TERM CURRENT USE OF OPIATE ANALGESIC: ICD-10-CM

## 2019-07-23 DIAGNOSIS — Z94.0 S/P KIDNEY TRANSPLANT: ICD-10-CM

## 2019-07-23 DIAGNOSIS — Z00.00 ROUTINE GENERAL MEDICAL EXAMINATION AT A HEALTH CARE FACILITY: ICD-10-CM

## 2019-07-23 DIAGNOSIS — Z94.0 S/P KIDNEY TRANSPLANT: Primary | ICD-10-CM

## 2019-07-23 LAB
ALBUMIN SERPL-MCNC: 3.9 G/DL (ref 3.5–4.8)
ALBUMIN/GLOB SERPL: 1.8 G/DL (ref 1–1.7)
ALP SERPL-CCNC: 38 U/L (ref 32–91)
ALT SERPL W P-5'-P-CCNC: 10 U/L (ref 14–54)
ANION GAP SERPL CALCULATED.3IONS-SCNC: 14.1 MMOL/L (ref 5–15)
AST SERPL-CCNC: 13 U/L (ref 15–41)
BASOPHILS # BLD AUTO: 0 10*3/MM3 (ref 0–0.2)
BASOPHILS NFR BLD AUTO: 0.6 % (ref 0–1.5)
BILIRUB SERPL-MCNC: 0.9 MG/DL (ref 0.3–1.2)
BUN BLD-MCNC: 13 MG/DL (ref 8–20)
BUN/CREAT SERPL: 16.3 (ref 5.4–26.2)
CALCIUM SPEC-SCNC: 10 MG/DL (ref 8.9–10.3)
CHLORIDE SERPL-SCNC: 104 MMOL/L (ref 101–111)
CO2 SERPL-SCNC: 27 MMOL/L (ref 22–32)
CREAT BLD-MCNC: 0.8 MG/DL (ref 0.4–1)
CREAT UR-MCNC: 147.9 MG/DL
DEPRECATED RDW RBC AUTO: 45.9 FL (ref 37–54)
EOSINOPHIL # BLD AUTO: 0.1 10*3/MM3 (ref 0–0.4)
EOSINOPHIL NFR BLD AUTO: 1.1 % (ref 0.3–6.2)
ERYTHROCYTE [DISTWIDTH] IN BLOOD BY AUTOMATED COUNT: 13.5 % (ref 12.3–15.4)
GFR SERPL CREATININE-BSD FRML MDRD: 71 ML/MIN/1.73
GLOBULIN UR ELPH-MCNC: 2.2 GM/DL (ref 2.5–3.8)
GLUCOSE BLD-MCNC: 86 MG/DL (ref 65–99)
HCT VFR BLD AUTO: 39.5 % (ref 34–46.6)
HGB BLD-MCNC: 13.3 G/DL (ref 12–15.9)
LYMPHOCYTES # BLD AUTO: 0.8 10*3/MM3 (ref 0.7–3.1)
LYMPHOCYTES NFR BLD AUTO: 16.7 % (ref 19.6–45.3)
MCH RBC QN AUTO: 32.7 PG (ref 26.6–33)
MCHC RBC AUTO-ENTMCNC: 33.6 G/DL (ref 31.5–35.7)
MCV RBC AUTO: 97.1 FL (ref 79–97)
MONOCYTES # BLD AUTO: 0.6 10*3/MM3 (ref 0.1–0.9)
MONOCYTES NFR BLD AUTO: 13.1 % (ref 5–12)
NEUTROPHILS # BLD AUTO: 3.2 10*3/MM3 (ref 1.7–7)
NEUTROPHILS NFR BLD AUTO: 68.5 % (ref 42.7–76)
NRBC BLD AUTO-RTO: 0.1 /100 WBC (ref 0–0.2)
PLATELET # BLD AUTO: 112 10*3/MM3 (ref 140–450)
PMV BLD AUTO: 7.2 FL (ref 6–12)
POTASSIUM BLD-SCNC: 4.1 MMOL/L (ref 3.6–5.1)
PROT SERPL-MCNC: 6.1 G/DL (ref 6.1–7.9)
PROT UR-MCNC: 13 MG/DL
PROT/CREAT UR: 87.9 MG/G CREA (ref 0–200)
RBC # BLD AUTO: 4.06 10*6/MM3 (ref 3.77–5.28)
SODIUM BLD-SCNC: 141 MMOL/L (ref 136–144)
WBC NRBC COR # BLD: 4.7 10*3/MM3 (ref 3.4–10.8)

## 2019-07-23 PROCEDURE — 36415 COLL VENOUS BLD VENIPUNCTURE: CPT

## 2019-07-23 PROCEDURE — 80197 ASSAY OF TACROLIMUS: CPT

## 2019-07-23 PROCEDURE — 84156 ASSAY OF PROTEIN URINE: CPT

## 2019-07-23 PROCEDURE — 85025 COMPLETE CBC W/AUTO DIFF WBC: CPT

## 2019-07-23 PROCEDURE — 80053 COMPREHEN METABOLIC PANEL: CPT

## 2019-07-23 PROCEDURE — 87799 DETECT AGENT NOS DNA QUANT: CPT

## 2019-07-23 PROCEDURE — 82570 ASSAY OF URINE CREATININE: CPT

## 2019-07-25 LAB
BKV DNA # SERPL NAA+PROBE: NEGATIVE COPIES/ML
BKV DNA # UR NAA+PROBE: NORMAL COPIES/ML
LOG10 BK QN PCR UR: 4.09 LOG10COPY/ML
LOG10 BK QN PCR: NORMAL LOG10COPY/ML

## 2019-07-26 LAB — TACROLIMUS BLD-MCNC: 10.4 NG/ML (ref 2–20)

## 2019-10-21 ENCOUNTER — LAB (OUTPATIENT)
Dept: LAB | Facility: HOSPITAL | Age: 74
End: 2019-10-21

## 2019-10-21 ENCOUNTER — TRANSCRIBE ORDERS (OUTPATIENT)
Dept: ADMINISTRATIVE | Facility: HOSPITAL | Age: 74
End: 2019-10-21

## 2019-10-21 DIAGNOSIS — Z94.0 KIDNEY REPLACED BY TRANSPLANT: ICD-10-CM

## 2019-10-21 DIAGNOSIS — Z13.1 DIABETES MELLITUS SCREENING: ICD-10-CM

## 2019-10-21 DIAGNOSIS — Z13.1 DIABETES MELLITUS SCREENING: Primary | ICD-10-CM

## 2019-10-21 LAB
ALBUMIN SERPL-MCNC: 4.7 G/DL (ref 3.5–5.2)
ALBUMIN/GLOB SERPL: 2 G/DL
ALP SERPL-CCNC: 44 U/L (ref 39–117)
ALT SERPL W P-5'-P-CCNC: 11 U/L (ref 1–33)
ANION GAP SERPL CALCULATED.3IONS-SCNC: 10.3 MMOL/L (ref 5–15)
AST SERPL-CCNC: 19 U/L (ref 1–32)
BASOPHILS # BLD AUTO: 0.03 10*3/MM3 (ref 0–0.2)
BASOPHILS NFR BLD AUTO: 0.4 % (ref 0–1.5)
BILIRUB SERPL-MCNC: 0.8 MG/DL (ref 0.2–1.2)
BUN BLD-MCNC: 14 MG/DL (ref 8–23)
BUN/CREAT SERPL: 18.4 (ref 7–25)
CALCIUM SPEC-SCNC: 10.2 MG/DL (ref 8.6–10.5)
CHLORIDE SERPL-SCNC: 105 MMOL/L (ref 98–107)
CHOLEST SERPL-MCNC: 177 MG/DL (ref 0–200)
CO2 SERPL-SCNC: 26.7 MMOL/L (ref 22–29)
CREAT BLD-MCNC: 0.76 MG/DL (ref 0.57–1)
CREAT UR-MCNC: 127.7 MG/DL
DEPRECATED RDW RBC AUTO: 45.4 FL (ref 37–54)
EOSINOPHIL # BLD AUTO: 0.03 10*3/MM3 (ref 0–0.4)
EOSINOPHIL NFR BLD AUTO: 0.4 % (ref 0.3–6.2)
ERYTHROCYTE [DISTWIDTH] IN BLOOD BY AUTOMATED COUNT: 12.7 % (ref 12.3–15.4)
GFR SERPL CREATININE-BSD FRML MDRD: 75 ML/MIN/1.73
GLOBULIN UR ELPH-MCNC: 2.3 GM/DL
GLUCOSE BLD-MCNC: 97 MG/DL (ref 65–99)
HCT VFR BLD AUTO: 40.3 % (ref 34–46.6)
HGB BLD-MCNC: 13.5 G/DL (ref 12–15.9)
IMM GRANULOCYTES # BLD AUTO: 0.03 10*3/MM3 (ref 0–0.05)
IMM GRANULOCYTES NFR BLD AUTO: 0.4 % (ref 0–0.5)
LYMPHOCYTES # BLD AUTO: 0.82 10*3/MM3 (ref 0.7–3.1)
LYMPHOCYTES NFR BLD AUTO: 12.1 % (ref 19.6–45.3)
MCH RBC QN AUTO: 32.3 PG (ref 26.6–33)
MCHC RBC AUTO-ENTMCNC: 33.5 G/DL (ref 31.5–35.7)
MCV RBC AUTO: 96.4 FL (ref 79–97)
MONOCYTES # BLD AUTO: 0.48 10*3/MM3 (ref 0.1–0.9)
MONOCYTES NFR BLD AUTO: 7.1 % (ref 5–12)
NEUTROPHILS # BLD AUTO: 5.41 10*3/MM3 (ref 1.7–7)
NEUTROPHILS NFR BLD AUTO: 79.6 % (ref 42.7–76)
NRBC BLD AUTO-RTO: 0 /100 WBC (ref 0–0.2)
PLATELET # BLD AUTO: 131 10*3/MM3 (ref 140–450)
PMV BLD AUTO: 9.1 FL (ref 6–12)
POTASSIUM BLD-SCNC: 4.2 MMOL/L (ref 3.5–5.2)
PROT SERPL-MCNC: 7 G/DL (ref 6–8.5)
PROT UR-MCNC: 14 MG/DL
PROT/CREAT UR: 109.6 MG/G CREA (ref 0–200)
RBC # BLD AUTO: 4.18 10*6/MM3 (ref 3.77–5.28)
SODIUM BLD-SCNC: 142 MMOL/L (ref 136–145)
TRIGL SERPL-MCNC: 93 MG/DL (ref 0–150)
WBC NRBC COR # BLD: 6.8 10*3/MM3 (ref 3.4–10.8)

## 2019-10-21 PROCEDURE — 80197 ASSAY OF TACROLIMUS: CPT

## 2019-10-21 PROCEDURE — 36415 COLL VENOUS BLD VENIPUNCTURE: CPT

## 2019-10-21 PROCEDURE — 80053 COMPREHEN METABOLIC PANEL: CPT

## 2019-10-21 PROCEDURE — 84478 ASSAY OF TRIGLYCERIDES: CPT

## 2019-10-21 PROCEDURE — 84156 ASSAY OF PROTEIN URINE: CPT

## 2019-10-21 PROCEDURE — 82465 ASSAY BLD/SERUM CHOLESTEROL: CPT

## 2019-10-21 PROCEDURE — 85025 COMPLETE CBC W/AUTO DIFF WBC: CPT

## 2019-10-21 PROCEDURE — 87799 DETECT AGENT NOS DNA QUANT: CPT

## 2019-10-21 PROCEDURE — 82570 ASSAY OF URINE CREATININE: CPT

## 2019-10-23 LAB
BKV DNA # SERPL NAA+PROBE: NEGATIVE COPIES/ML
BKV DNA # UR NAA+PROBE: NORMAL COPIES/ML
LOG10 BK QN PCR UR: 5.34 LOG10COPY/ML
LOG10 BK QN PCR: NORMAL
TACROLIMUS BLD-MCNC: 3.7 NG/ML (ref 2–20)

## 2019-11-18 ENCOUNTER — LAB (OUTPATIENT)
Dept: LAB | Facility: HOSPITAL | Age: 74
End: 2019-11-18

## 2019-11-18 DIAGNOSIS — Z13.1 DIABETES MELLITUS SCREENING: ICD-10-CM

## 2019-11-18 DIAGNOSIS — Z94.0 KIDNEY REPLACED BY TRANSPLANT: ICD-10-CM

## 2019-11-18 LAB
ALBUMIN SERPL-MCNC: 4.1 G/DL (ref 3.5–5.2)
ALBUMIN/GLOB SERPL: 2 G/DL
ALP SERPL-CCNC: 39 U/L (ref 39–117)
ALT SERPL W P-5'-P-CCNC: 7 U/L (ref 1–33)
ANION GAP SERPL CALCULATED.3IONS-SCNC: 14.9 MMOL/L (ref 5–15)
AST SERPL-CCNC: 10 U/L (ref 1–32)
BASOPHILS # BLD AUTO: 0.03 10*3/MM3 (ref 0–0.2)
BASOPHILS NFR BLD AUTO: 0.8 % (ref 0–1.5)
BILIRUB SERPL-MCNC: 0.6 MG/DL (ref 0.2–1.2)
BUN BLD-MCNC: 16 MG/DL (ref 8–23)
BUN/CREAT SERPL: 21.3 (ref 7–25)
CALCIUM SPEC-SCNC: 10.1 MG/DL (ref 8.6–10.5)
CHLORIDE SERPL-SCNC: 113 MMOL/L (ref 98–107)
CHOLEST SERPL-MCNC: 171 MG/DL (ref 0–200)
CO2 SERPL-SCNC: 26.1 MMOL/L (ref 22–29)
CREAT BLD-MCNC: 0.75 MG/DL (ref 0.57–1)
CREAT UR-MCNC: 251.9 MG/DL
DEPRECATED RDW RBC AUTO: 43.4 FL (ref 37–54)
EOSINOPHIL # BLD AUTO: 0.03 10*3/MM3 (ref 0–0.4)
EOSINOPHIL NFR BLD AUTO: 0.8 % (ref 0.3–6.2)
ERYTHROCYTE [DISTWIDTH] IN BLOOD BY AUTOMATED COUNT: 12.3 % (ref 12.3–15.4)
GFR SERPL CREATININE-BSD FRML MDRD: 76 ML/MIN/1.73
GLOBULIN UR ELPH-MCNC: 2.1 GM/DL
GLUCOSE BLD-MCNC: 82 MG/DL (ref 65–99)
HCT VFR BLD AUTO: 36.9 % (ref 34–46.6)
HGB BLD-MCNC: 12.6 G/DL (ref 12–15.9)
IMM GRANULOCYTES # BLD AUTO: 0.01 10*3/MM3 (ref 0–0.05)
IMM GRANULOCYTES NFR BLD AUTO: 0.3 % (ref 0–0.5)
LYMPHOCYTES # BLD AUTO: 0.75 10*3/MM3 (ref 0.7–3.1)
LYMPHOCYTES NFR BLD AUTO: 20.5 % (ref 19.6–45.3)
MCH RBC QN AUTO: 32.9 PG (ref 26.6–33)
MCHC RBC AUTO-ENTMCNC: 34.1 G/DL (ref 31.5–35.7)
MCV RBC AUTO: 96.3 FL (ref 79–97)
MONOCYTES # BLD AUTO: 0.57 10*3/MM3 (ref 0.1–0.9)
MONOCYTES NFR BLD AUTO: 15.6 % (ref 5–12)
NEUTROPHILS # BLD AUTO: 2.26 10*3/MM3 (ref 1.7–7)
NEUTROPHILS NFR BLD AUTO: 62 % (ref 42.7–76)
NRBC BLD AUTO-RTO: 0 /100 WBC (ref 0–0.2)
PLATELET # BLD AUTO: 113 10*3/MM3 (ref 140–450)
PMV BLD AUTO: 9.2 FL (ref 6–12)
POTASSIUM BLD-SCNC: 4.2 MMOL/L (ref 3.5–5.2)
PROT SERPL-MCNC: 6.2 G/DL (ref 6–8.5)
PROT UR-MCNC: 24 MG/DL
PROT/CREAT UR: 95.3 MG/G CREA (ref 0–200)
RBC # BLD AUTO: 3.83 10*6/MM3 (ref 3.77–5.28)
SODIUM BLD-SCNC: 154 MMOL/L (ref 136–145)
TRIGL SERPL-MCNC: 100 MG/DL (ref 0–150)
WBC NRBC COR # BLD: 3.65 10*3/MM3 (ref 3.4–10.8)

## 2019-11-18 PROCEDURE — 82570 ASSAY OF URINE CREATININE: CPT

## 2019-11-18 PROCEDURE — 82465 ASSAY BLD/SERUM CHOLESTEROL: CPT

## 2019-11-18 PROCEDURE — 85025 COMPLETE CBC W/AUTO DIFF WBC: CPT

## 2019-11-18 PROCEDURE — 36415 COLL VENOUS BLD VENIPUNCTURE: CPT

## 2019-11-18 PROCEDURE — 84478 ASSAY OF TRIGLYCERIDES: CPT

## 2019-11-18 PROCEDURE — 84156 ASSAY OF PROTEIN URINE: CPT

## 2019-11-18 PROCEDURE — 87799 DETECT AGENT NOS DNA QUANT: CPT

## 2019-11-18 PROCEDURE — 80197 ASSAY OF TACROLIMUS: CPT

## 2019-11-18 PROCEDURE — 80053 COMPREHEN METABOLIC PANEL: CPT

## 2019-11-20 LAB
BKV DNA # SERPL NAA+PROBE: NEGATIVE COPIES/ML
LOG10 BK QN PCR: NORMAL
TACROLIMUS BLD-MCNC: 9.3 NG/ML (ref 2–20)

## 2019-11-21 LAB
BKV DNA # UR NAA+PROBE: NORMAL COPIES/ML
LOG10 BK QN PCR UR: 5.55 LOG10COPY/ML

## 2019-11-25 ENCOUNTER — TRANSCRIBE ORDERS (OUTPATIENT)
Dept: ADMINISTRATIVE | Facility: HOSPITAL | Age: 74
End: 2019-11-25

## 2019-11-25 ENCOUNTER — LAB (OUTPATIENT)
Dept: LAB | Facility: HOSPITAL | Age: 74
End: 2019-11-25

## 2019-11-25 DIAGNOSIS — N18.2 CHRONIC KIDNEY DISEASE, STAGE II (MILD): ICD-10-CM

## 2019-11-25 DIAGNOSIS — E87.0 HYPERNATREMIA: Primary | ICD-10-CM

## 2019-11-25 DIAGNOSIS — E87.0 HYPERNATREMIA: ICD-10-CM

## 2019-11-25 DIAGNOSIS — Z94.0 KIDNEY REPLACED BY TRANSPLANT: Primary | ICD-10-CM

## 2019-11-25 DIAGNOSIS — Z94.0 KIDNEY REPLACED BY TRANSPLANT: ICD-10-CM

## 2019-11-25 DIAGNOSIS — N18.2 CHRONIC KIDNEY DISEASE, STAGE II (MILD): Primary | ICD-10-CM

## 2019-11-25 DIAGNOSIS — Z13.1 DIABETES MELLITUS SCREENING: ICD-10-CM

## 2019-11-25 LAB
ALBUMIN SERPL-MCNC: 4.1 G/DL (ref 3.5–5.2)
ALBUMIN/GLOB SERPL: 2 G/DL
ALP SERPL-CCNC: 39 U/L (ref 39–117)
ALT SERPL W P-5'-P-CCNC: 7 U/L (ref 1–33)
ANION GAP SERPL CALCULATED.3IONS-SCNC: 9.9 MMOL/L (ref 5–15)
AST SERPL-CCNC: 14 U/L (ref 1–32)
BASOPHILS # BLD AUTO: 0.03 10*3/MM3 (ref 0–0.2)
BASOPHILS NFR BLD AUTO: 0.8 % (ref 0–1.5)
BILIRUB SERPL-MCNC: 0.6 MG/DL (ref 0.2–1.2)
BILIRUB UR QL STRIP: NEGATIVE
BUN BLD-MCNC: 13 MG/DL (ref 8–23)
BUN/CREAT SERPL: 16.5 (ref 7–25)
CALCIUM SPEC-SCNC: 10.1 MG/DL (ref 8.6–10.5)
CHLORIDE SERPL-SCNC: 106 MMOL/L (ref 98–107)
CLARITY UR: CLEAR
CO2 SERPL-SCNC: 29.1 MMOL/L (ref 22–29)
COLOR UR: YELLOW
CREAT BLD-MCNC: 0.79 MG/DL (ref 0.57–1)
DEPRECATED RDW RBC AUTO: 43.6 FL (ref 37–54)
EOSINOPHIL # BLD AUTO: 0.04 10*3/MM3 (ref 0–0.4)
EOSINOPHIL NFR BLD AUTO: 1 % (ref 0.3–6.2)
ERYTHROCYTE [DISTWIDTH] IN BLOOD BY AUTOMATED COUNT: 12.4 % (ref 12.3–15.4)
GFR SERPL CREATININE-BSD FRML MDRD: 72 ML/MIN/1.73
GLOBULIN UR ELPH-MCNC: 2.1 GM/DL
GLUCOSE BLD-MCNC: 82 MG/DL (ref 65–99)
GLUCOSE UR STRIP-MCNC: NEGATIVE MG/DL
HCT VFR BLD AUTO: 38.6 % (ref 34–46.6)
HGB BLD-MCNC: 12.8 G/DL (ref 12–15.9)
HGB UR QL STRIP.AUTO: NEGATIVE
IMM GRANULOCYTES # BLD AUTO: 0.01 10*3/MM3 (ref 0–0.05)
IMM GRANULOCYTES NFR BLD AUTO: 0.3 % (ref 0–0.5)
KETONES UR QL STRIP: NEGATIVE
LEUKOCYTE ESTERASE UR QL STRIP.AUTO: ABNORMAL
LYMPHOCYTES # BLD AUTO: 0.69 10*3/MM3 (ref 0.7–3.1)
LYMPHOCYTES NFR BLD AUTO: 17.4 % (ref 19.6–45.3)
MAGNESIUM SERPL-MCNC: 1.6 MG/DL (ref 1.6–2.4)
MCH RBC QN AUTO: 31.9 PG (ref 26.6–33)
MCHC RBC AUTO-ENTMCNC: 33.2 G/DL (ref 31.5–35.7)
MCV RBC AUTO: 96.3 FL (ref 79–97)
MONOCYTES # BLD AUTO: 0.62 10*3/MM3 (ref 0.1–0.9)
MONOCYTES NFR BLD AUTO: 15.7 % (ref 5–12)
NEUTROPHILS # BLD AUTO: 2.57 10*3/MM3 (ref 1.7–7)
NEUTROPHILS NFR BLD AUTO: 64.8 % (ref 42.7–76)
NITRITE UR QL STRIP: POSITIVE
NRBC BLD AUTO-RTO: 0 /100 WBC (ref 0–0.2)
PH UR STRIP.AUTO: 5.5 [PH] (ref 5–8)
PHOSPHATE SERPL-MCNC: 3.2 MG/DL (ref 2.5–4.5)
PLATELET # BLD AUTO: 113 10*3/MM3 (ref 140–450)
PMV BLD AUTO: 9.4 FL (ref 6–12)
POTASSIUM BLD-SCNC: 4.3 MMOL/L (ref 3.5–5.2)
PROT SERPL-MCNC: 6.2 G/DL (ref 6–8.5)
PROT UR QL STRIP: NEGATIVE
RBC # BLD AUTO: 4.01 10*6/MM3 (ref 3.77–5.28)
SODIUM BLD-SCNC: 145 MMOL/L (ref 136–145)
SP GR UR STRIP: 1.02 (ref 1–1.03)
UROBILINOGEN UR QL STRIP: ABNORMAL
WBC NRBC COR # BLD: 3.96 10*3/MM3 (ref 3.4–10.8)

## 2019-11-25 PROCEDURE — 85025 COMPLETE CBC W/AUTO DIFF WBC: CPT

## 2019-11-25 PROCEDURE — 81003 URINALYSIS AUTO W/O SCOPE: CPT

## 2019-11-25 PROCEDURE — 83735 ASSAY OF MAGNESIUM: CPT

## 2019-11-25 PROCEDURE — 84100 ASSAY OF PHOSPHORUS: CPT

## 2019-11-25 PROCEDURE — 36415 COLL VENOUS BLD VENIPUNCTURE: CPT

## 2019-11-25 PROCEDURE — 80053 COMPREHEN METABOLIC PANEL: CPT

## 2019-11-25 PROCEDURE — 80197 ASSAY OF TACROLIMUS: CPT

## 2019-11-26 LAB — TACROLIMUS BLD-MCNC: 8 NG/ML (ref 2–20)

## 2020-02-06 ENCOUNTER — LAB (OUTPATIENT)
Dept: LAB | Facility: HOSPITAL | Age: 75
End: 2020-02-06

## 2020-02-06 ENCOUNTER — TRANSCRIBE ORDERS (OUTPATIENT)
Dept: ADMINISTRATIVE | Facility: HOSPITAL | Age: 75
End: 2020-02-06

## 2020-02-06 DIAGNOSIS — Z94.0 KIDNEY REPLACED BY TRANSPLANT: ICD-10-CM

## 2020-02-06 DIAGNOSIS — Z94.0 KIDNEY REPLACED BY TRANSPLANT: Primary | ICD-10-CM

## 2020-02-06 DIAGNOSIS — Z13.1 SCREENING FOR DIABETES MELLITUS: ICD-10-CM

## 2020-02-06 DIAGNOSIS — Z13.1 DIABETES MELLITUS SCREENING: ICD-10-CM

## 2020-02-06 DIAGNOSIS — Z79.899 ENCOUNTER FOR LONG-TERM (CURRENT) USE OF HIGH-RISK MEDICATION: ICD-10-CM

## 2020-02-06 DIAGNOSIS — Z00.00 ROUTINE GENERAL MEDICAL EXAMINATION AT A HEALTH CARE FACILITY: ICD-10-CM

## 2020-02-06 LAB
ALBUMIN SERPL-MCNC: 4.1 G/DL (ref 3.5–5.2)
ALBUMIN/GLOB SERPL: 2.3 G/DL
ALP SERPL-CCNC: 39 U/L (ref 39–117)
ALT SERPL W P-5'-P-CCNC: 9 U/L (ref 1–33)
ANION GAP SERPL CALCULATED.3IONS-SCNC: 10.6 MMOL/L (ref 5–15)
AST SERPL-CCNC: 12 U/L (ref 1–32)
BASOPHILS # BLD AUTO: 0.03 10*3/MM3 (ref 0–0.2)
BASOPHILS NFR BLD AUTO: 0.8 % (ref 0–1.5)
BILIRUB SERPL-MCNC: 0.5 MG/DL (ref 0.2–1.2)
BUN BLD-MCNC: 11 MG/DL (ref 8–23)
BUN/CREAT SERPL: 15.3 (ref 7–25)
CALCIUM SPEC-SCNC: 10.4 MG/DL (ref 8.6–10.5)
CHLORIDE SERPL-SCNC: 105 MMOL/L (ref 98–107)
CHOLEST SERPL-MCNC: 173 MG/DL (ref 0–200)
CO2 SERPL-SCNC: 27.4 MMOL/L (ref 22–29)
CREAT BLD-MCNC: 0.72 MG/DL (ref 0.57–1)
CREAT UR-MCNC: 223.8 MG/DL
DEPRECATED RDW RBC AUTO: 44.3 FL (ref 37–54)
EOSINOPHIL # BLD AUTO: 0.04 10*3/MM3 (ref 0–0.4)
EOSINOPHIL NFR BLD AUTO: 1.1 % (ref 0.3–6.2)
ERYTHROCYTE [DISTWIDTH] IN BLOOD BY AUTOMATED COUNT: 12.5 % (ref 12.3–15.4)
GFR SERPL CREATININE-BSD FRML MDRD: 79 ML/MIN/1.73
GLOBULIN UR ELPH-MCNC: 1.8 GM/DL
GLUCOSE BLD-MCNC: 81 MG/DL (ref 65–99)
HCT VFR BLD AUTO: 38 % (ref 34–46.6)
HGB BLD-MCNC: 13 G/DL (ref 12–15.9)
IMM GRANULOCYTES # BLD AUTO: 0.01 10*3/MM3 (ref 0–0.05)
IMM GRANULOCYTES NFR BLD AUTO: 0.3 % (ref 0–0.5)
LYMPHOCYTES # BLD AUTO: 0.57 10*3/MM3 (ref 0.7–3.1)
LYMPHOCYTES NFR BLD AUTO: 15 % (ref 19.6–45.3)
MCH RBC QN AUTO: 33.1 PG (ref 26.6–33)
MCHC RBC AUTO-ENTMCNC: 34.2 G/DL (ref 31.5–35.7)
MCV RBC AUTO: 96.7 FL (ref 79–97)
MONOCYTES # BLD AUTO: 0.56 10*3/MM3 (ref 0.1–0.9)
MONOCYTES NFR BLD AUTO: 14.7 % (ref 5–12)
NEUTROPHILS # BLD AUTO: 2.59 10*3/MM3 (ref 1.7–7)
NEUTROPHILS NFR BLD AUTO: 68.1 % (ref 42.7–76)
NRBC BLD AUTO-RTO: 0 /100 WBC (ref 0–0.2)
PLATELET # BLD AUTO: 113 10*3/MM3 (ref 140–450)
PMV BLD AUTO: 9.4 FL (ref 6–12)
POTASSIUM BLD-SCNC: 4.5 MMOL/L (ref 3.5–5.2)
PROT SERPL-MCNC: 5.9 G/DL (ref 6–8.5)
PROT UR-MCNC: 21 MG/DL
PROT/CREAT UR: 93.8 MG/G CREA (ref 0–200)
RBC # BLD AUTO: 3.93 10*6/MM3 (ref 3.77–5.28)
SODIUM BLD-SCNC: 143 MMOL/L (ref 136–145)
TRIGL SERPL-MCNC: 81 MG/DL (ref 0–150)
WBC NRBC COR # BLD: 3.8 10*3/MM3 (ref 3.4–10.8)

## 2020-02-06 PROCEDURE — 84478 ASSAY OF TRIGLYCERIDES: CPT

## 2020-02-06 PROCEDURE — 82465 ASSAY BLD/SERUM CHOLESTEROL: CPT

## 2020-02-06 PROCEDURE — 80053 COMPREHEN METABOLIC PANEL: CPT

## 2020-02-06 PROCEDURE — 82570 ASSAY OF URINE CREATININE: CPT

## 2020-02-06 PROCEDURE — 36415 COLL VENOUS BLD VENIPUNCTURE: CPT

## 2020-02-06 PROCEDURE — 80197 ASSAY OF TACROLIMUS: CPT

## 2020-02-06 PROCEDURE — 87799 DETECT AGENT NOS DNA QUANT: CPT

## 2020-02-06 PROCEDURE — 85025 COMPLETE CBC W/AUTO DIFF WBC: CPT

## 2020-02-06 PROCEDURE — 84156 ASSAY OF PROTEIN URINE: CPT

## 2020-02-07 LAB — TACROLIMUS BLD-MCNC: 5.5 NG/ML (ref 2–20)

## 2020-02-08 LAB
BKV DNA # SERPL NAA+PROBE: NEGATIVE COPIES/ML
LOG10 BK QN PCR: NORMAL

## 2020-02-09 LAB
BKV DNA # UR NAA+PROBE: NORMAL COPIES/ML
LOG10 BK QN PCR UR: 4.27 LOG10COPY/ML

## 2020-05-11 ENCOUNTER — TRANSCRIBE ORDERS (OUTPATIENT)
Dept: LAB | Facility: HOSPITAL | Age: 75
End: 2020-05-11

## 2020-05-11 ENCOUNTER — LAB (OUTPATIENT)
Dept: LAB | Facility: HOSPITAL | Age: 75
End: 2020-05-11

## 2020-05-11 DIAGNOSIS — Z00.00 ROUTINE GENERAL MEDICAL EXAMINATION AT A HEALTH CARE FACILITY: ICD-10-CM

## 2020-05-11 DIAGNOSIS — Z79.891 ENCOUNTER FOR LONG-TERM METHADONE USE: ICD-10-CM

## 2020-05-11 DIAGNOSIS — Z94.0 KIDNEY REPLACED BY TRANSPLANT: Primary | ICD-10-CM

## 2020-05-11 DIAGNOSIS — Z94.0 KIDNEY REPLACED BY TRANSPLANT: ICD-10-CM

## 2020-05-11 LAB
ALBUMIN SERPL-MCNC: 4.2 G/DL (ref 3.5–5.2)
ALBUMIN/GLOB SERPL: 1.9 G/DL
ALP SERPL-CCNC: 42 U/L (ref 39–117)
ALT SERPL W P-5'-P-CCNC: 5 U/L (ref 1–33)
ANION GAP SERPL CALCULATED.3IONS-SCNC: 10.6 MMOL/L (ref 5–15)
AST SERPL-CCNC: 11 U/L (ref 1–32)
BASOPHILS # BLD AUTO: 0.03 10*3/MM3 (ref 0–0.2)
BASOPHILS NFR BLD AUTO: 0.7 % (ref 0–1.5)
BILIRUB SERPL-MCNC: 0.5 MG/DL (ref 0.2–1.2)
BUN BLD-MCNC: 11 MG/DL (ref 8–23)
BUN/CREAT SERPL: 12.6 (ref 7–25)
CALCIUM SPEC-SCNC: 10 MG/DL (ref 8.6–10.5)
CHLORIDE SERPL-SCNC: 104 MMOL/L (ref 98–107)
CHOLEST SERPL-MCNC: 162 MG/DL (ref 0–200)
CO2 SERPL-SCNC: 27.4 MMOL/L (ref 22–29)
CREAT BLD-MCNC: 0.87 MG/DL (ref 0.57–1)
CREAT UR-MCNC: 273.8 MG/DL
DEPRECATED RDW RBC AUTO: 45 FL (ref 37–54)
EOSINOPHIL # BLD AUTO: 0.05 10*3/MM3 (ref 0–0.4)
EOSINOPHIL NFR BLD AUTO: 1.2 % (ref 0.3–6.2)
ERYTHROCYTE [DISTWIDTH] IN BLOOD BY AUTOMATED COUNT: 12.7 % (ref 12.3–15.4)
GFR SERPL CREATININE-BSD FRML MDRD: 64 ML/MIN/1.73
GLOBULIN UR ELPH-MCNC: 2.2 GM/DL
GLUCOSE BLD-MCNC: 84 MG/DL (ref 65–99)
HCT VFR BLD AUTO: 34.9 % (ref 34–46.6)
HGB BLD-MCNC: 11.9 G/DL (ref 12–15.9)
IMM GRANULOCYTES # BLD AUTO: 0.01 10*3/MM3 (ref 0–0.05)
IMM GRANULOCYTES NFR BLD AUTO: 0.2 % (ref 0–0.5)
LYMPHOCYTES # BLD AUTO: 0.6 10*3/MM3 (ref 0.7–3.1)
LYMPHOCYTES NFR BLD AUTO: 14.3 % (ref 19.6–45.3)
MCH RBC QN AUTO: 32.6 PG (ref 26.6–33)
MCHC RBC AUTO-ENTMCNC: 34.1 G/DL (ref 31.5–35.7)
MCV RBC AUTO: 95.6 FL (ref 79–97)
MONOCYTES # BLD AUTO: 0.58 10*3/MM3 (ref 0.1–0.9)
MONOCYTES NFR BLD AUTO: 13.8 % (ref 5–12)
NEUTROPHILS # BLD AUTO: 2.93 10*3/MM3 (ref 1.7–7)
NEUTROPHILS NFR BLD AUTO: 69.8 % (ref 42.7–76)
NRBC BLD AUTO-RTO: 0 /100 WBC (ref 0–0.2)
PLATELET # BLD AUTO: 108 10*3/MM3 (ref 140–450)
PMV BLD AUTO: 9 FL (ref 6–12)
POTASSIUM BLD-SCNC: 3.9 MMOL/L (ref 3.5–5.2)
PROT SERPL-MCNC: 6.4 G/DL (ref 6–8.5)
PROT UR-MCNC: 27 MG/DL
PROT/CREAT UR: 98.6 MG/G CREA (ref 0–200)
RBC # BLD AUTO: 3.65 10*6/MM3 (ref 3.77–5.28)
SODIUM BLD-SCNC: 142 MMOL/L (ref 136–145)
TRIGL SERPL-MCNC: 97 MG/DL (ref 0–150)
WBC NRBC COR # BLD: 4.2 10*3/MM3 (ref 3.4–10.8)

## 2020-05-11 PROCEDURE — 80197 ASSAY OF TACROLIMUS: CPT

## 2020-05-11 PROCEDURE — 80053 COMPREHEN METABOLIC PANEL: CPT

## 2020-05-11 PROCEDURE — 85025 COMPLETE CBC W/AUTO DIFF WBC: CPT

## 2020-05-11 PROCEDURE — 84478 ASSAY OF TRIGLYCERIDES: CPT

## 2020-05-11 PROCEDURE — 87799 DETECT AGENT NOS DNA QUANT: CPT

## 2020-05-11 PROCEDURE — 84156 ASSAY OF PROTEIN URINE: CPT

## 2020-05-11 PROCEDURE — 36415 COLL VENOUS BLD VENIPUNCTURE: CPT

## 2020-05-11 PROCEDURE — 82465 ASSAY BLD/SERUM CHOLESTEROL: CPT

## 2020-05-11 PROCEDURE — 82570 ASSAY OF URINE CREATININE: CPT

## 2020-05-13 LAB
BKV DNA # SERPL NAA+PROBE: NEGATIVE COPIES/ML
BKV DNA # UR NAA+PROBE: NORMAL COPIES/ML
LOG10 BK QN PCR UR: 4.52 LOG10COPY/ML
LOG10 BK QN PCR: NORMAL
TACROLIMUS BLD-MCNC: 5.5 NG/ML (ref 2–20)

## 2020-07-29 ENCOUNTER — LAB (OUTPATIENT)
Dept: LAB | Facility: HOSPITAL | Age: 75
End: 2020-07-29

## 2020-07-29 DIAGNOSIS — Z00.00 ROUTINE GENERAL MEDICAL EXAMINATION AT A HEALTH CARE FACILITY: ICD-10-CM

## 2020-07-29 DIAGNOSIS — Z94.0 KIDNEY REPLACED BY TRANSPLANT: ICD-10-CM

## 2020-07-29 DIAGNOSIS — Z79.891 ENCOUNTER FOR LONG-TERM METHADONE USE: ICD-10-CM

## 2020-07-29 LAB
ALBUMIN SERPL-MCNC: 4.3 G/DL (ref 3.5–5.2)
ALBUMIN/GLOB SERPL: 2.2 G/DL
ALP SERPL-CCNC: 38 U/L (ref 39–117)
ALT SERPL W P-5'-P-CCNC: 11 U/L (ref 1–33)
ANION GAP SERPL CALCULATED.3IONS-SCNC: 11.2 MMOL/L (ref 5–15)
AST SERPL-CCNC: 17 U/L (ref 1–32)
BASOPHILS # BLD AUTO: 0.03 10*3/MM3 (ref 0–0.2)
BASOPHILS NFR BLD AUTO: 0.9 % (ref 0–1.5)
BILIRUB SERPL-MCNC: 0.7 MG/DL (ref 0–1.2)
BUN SERPL-MCNC: 11 MG/DL (ref 8–23)
BUN/CREAT SERPL: 14.1 (ref 7–25)
CALCIUM SPEC-SCNC: 10.3 MG/DL (ref 8.6–10.5)
CHLORIDE SERPL-SCNC: 105 MMOL/L (ref 98–107)
CHOLEST SERPL-MCNC: 161 MG/DL (ref 0–200)
CO2 SERPL-SCNC: 25.8 MMOL/L (ref 22–29)
CREAT SERPL-MCNC: 0.78 MG/DL (ref 0.57–1)
DEPRECATED RDW RBC AUTO: 48.7 FL (ref 37–54)
EOSINOPHIL # BLD AUTO: 0.04 10*3/MM3 (ref 0–0.4)
EOSINOPHIL NFR BLD AUTO: 1.1 % (ref 0.3–6.2)
ERYTHROCYTE [DISTWIDTH] IN BLOOD BY AUTOMATED COUNT: 13.5 % (ref 12.3–15.4)
GFR SERPL CREATININE-BSD FRML MDRD: 72 ML/MIN/1.73
GLOBULIN UR ELPH-MCNC: 2 GM/DL
GLUCOSE SERPL-MCNC: 77 MG/DL (ref 65–99)
HCT VFR BLD AUTO: 38.2 % (ref 34–46.6)
HGB BLD-MCNC: 12.4 G/DL (ref 12–15.9)
IMM GRANULOCYTES # BLD AUTO: 0.01 10*3/MM3 (ref 0–0.05)
IMM GRANULOCYTES NFR BLD AUTO: 0.3 % (ref 0–0.5)
LYMPHOCYTES # BLD AUTO: 0.78 10*3/MM3 (ref 0.7–3.1)
LYMPHOCYTES NFR BLD AUTO: 22.2 % (ref 19.6–45.3)
MCH RBC QN AUTO: 32 PG (ref 26.6–33)
MCHC RBC AUTO-ENTMCNC: 32.5 G/DL (ref 31.5–35.7)
MCV RBC AUTO: 98.5 FL (ref 79–97)
MONOCYTES # BLD AUTO: 0.53 10*3/MM3 (ref 0.1–0.9)
MONOCYTES NFR BLD AUTO: 15.1 % (ref 5–12)
NEUTROPHILS NFR BLD AUTO: 2.12 10*3/MM3 (ref 1.7–7)
NEUTROPHILS NFR BLD AUTO: 60.4 % (ref 42.7–76)
NRBC BLD AUTO-RTO: 0 /100 WBC (ref 0–0.2)
PLATELET # BLD AUTO: 131 10*3/MM3 (ref 140–450)
PMV BLD AUTO: 9.3 FL (ref 6–12)
POTASSIUM SERPL-SCNC: 3.9 MMOL/L (ref 3.5–5.2)
PROT SERPL-MCNC: 6.3 G/DL (ref 6–8.5)
RBC # BLD AUTO: 3.88 10*6/MM3 (ref 3.77–5.28)
SODIUM SERPL-SCNC: 142 MMOL/L (ref 136–145)
TRIGL SERPL-MCNC: 126 MG/DL (ref 0–150)
WBC # BLD AUTO: 3.51 10*3/MM3 (ref 3.4–10.8)

## 2020-07-29 PROCEDURE — 82465 ASSAY BLD/SERUM CHOLESTEROL: CPT

## 2020-07-29 PROCEDURE — 84156 ASSAY OF PROTEIN URINE: CPT

## 2020-07-29 PROCEDURE — 84478 ASSAY OF TRIGLYCERIDES: CPT

## 2020-07-29 PROCEDURE — 82570 ASSAY OF URINE CREATININE: CPT

## 2020-07-29 PROCEDURE — 85025 COMPLETE CBC W/AUTO DIFF WBC: CPT

## 2020-07-29 PROCEDURE — 87799 DETECT AGENT NOS DNA QUANT: CPT

## 2020-07-29 PROCEDURE — 80053 COMPREHEN METABOLIC PANEL: CPT

## 2020-07-29 PROCEDURE — 36415 COLL VENOUS BLD VENIPUNCTURE: CPT

## 2020-07-29 PROCEDURE — 80197 ASSAY OF TACROLIMUS: CPT

## 2020-07-30 LAB
CREAT UR-MCNC: 307.3 MG/DL
PROT UR-MCNC: 28 MG/DL
PROT/CREAT UR: 91.1 MG/G CREA (ref 0–200)

## 2020-07-31 LAB — TACROLIMUS BLD-MCNC: 10.2 NG/ML (ref 2–20)

## 2020-08-06 LAB
BKV DNA # SERPL NAA+PROBE: NEGATIVE COPIES/ML
LOG10 BK QN PCR: NORMAL

## 2020-08-10 LAB
BKV DNA # UR NAA+PROBE: 3100 COPIES/ML
LOG10 BK QN PCR UR: 3.49 LOG10COPY/ML

## 2020-10-05 ENCOUNTER — TRANSCRIBE ORDERS (OUTPATIENT)
Dept: ADMINISTRATIVE | Facility: HOSPITAL | Age: 75
End: 2020-10-05

## 2020-10-05 DIAGNOSIS — R60.0 BILATERAL LOWER EXTREMITY EDEMA: Primary | ICD-10-CM

## 2020-10-05 DIAGNOSIS — R06.00 DYSPNEA, UNSPECIFIED TYPE: ICD-10-CM

## 2020-10-12 ENCOUNTER — LAB (OUTPATIENT)
Dept: LAB | Facility: HOSPITAL | Age: 75
End: 2020-10-12

## 2020-10-12 DIAGNOSIS — Z79.891 ENCOUNTER FOR LONG-TERM METHADONE USE: ICD-10-CM

## 2020-10-12 DIAGNOSIS — Z94.0 KIDNEY REPLACED BY TRANSPLANT: ICD-10-CM

## 2020-10-12 DIAGNOSIS — Z00.00 ROUTINE GENERAL MEDICAL EXAMINATION AT A HEALTH CARE FACILITY: ICD-10-CM

## 2020-10-12 LAB
ALBUMIN SERPL-MCNC: 4.6 G/DL (ref 3.5–5.2)
ALBUMIN/GLOB SERPL: 1.9 G/DL
ALP SERPL-CCNC: 47 U/L (ref 39–117)
ALT SERPL W P-5'-P-CCNC: 10 U/L (ref 1–33)
ANION GAP SERPL CALCULATED.3IONS-SCNC: 9.4 MMOL/L (ref 5–15)
AST SERPL-CCNC: 19 U/L (ref 1–32)
BASOPHILS # BLD AUTO: 0.05 10*3/MM3 (ref 0–0.2)
BASOPHILS NFR BLD AUTO: 0.8 % (ref 0–1.5)
BILIRUB SERPL-MCNC: 0.7 MG/DL (ref 0–1.2)
BUN SERPL-MCNC: 19 MG/DL (ref 8–23)
BUN/CREAT SERPL: 24.4 (ref 7–25)
CALCIUM SPEC-SCNC: 10.6 MG/DL (ref 8.6–10.5)
CHLORIDE SERPL-SCNC: 104 MMOL/L (ref 98–107)
CHOLEST SERPL-MCNC: 195 MG/DL (ref 0–200)
CO2 SERPL-SCNC: 30.6 MMOL/L (ref 22–29)
CREAT SERPL-MCNC: 0.78 MG/DL (ref 0.57–1)
CREAT UR-MCNC: 245.1 MG/DL
DEPRECATED RDW RBC AUTO: 43.9 FL (ref 37–54)
EOSINOPHIL # BLD AUTO: 0.05 10*3/MM3 (ref 0–0.4)
EOSINOPHIL NFR BLD AUTO: 0.8 % (ref 0.3–6.2)
ERYTHROCYTE [DISTWIDTH] IN BLOOD BY AUTOMATED COUNT: 12.9 % (ref 12.3–15.4)
GFR SERPL CREATININE-BSD FRML MDRD: 72 ML/MIN/1.73
GLOBULIN UR ELPH-MCNC: 2.4 GM/DL
GLUCOSE SERPL-MCNC: 100 MG/DL (ref 65–99)
HCT VFR BLD AUTO: 40.2 % (ref 34–46.6)
HGB BLD-MCNC: 13.8 G/DL (ref 12–15.9)
IMM GRANULOCYTES # BLD AUTO: 0.02 10*3/MM3 (ref 0–0.05)
IMM GRANULOCYTES NFR BLD AUTO: 0.3 % (ref 0–0.5)
LYMPHOCYTES # BLD AUTO: 1.18 10*3/MM3 (ref 0.7–3.1)
LYMPHOCYTES NFR BLD AUTO: 19.4 % (ref 19.6–45.3)
MCH RBC QN AUTO: 32.5 PG (ref 26.6–33)
MCHC RBC AUTO-ENTMCNC: 34.3 G/DL (ref 31.5–35.7)
MCV RBC AUTO: 94.8 FL (ref 79–97)
MONOCYTES # BLD AUTO: 0.71 10*3/MM3 (ref 0.1–0.9)
MONOCYTES NFR BLD AUTO: 11.7 % (ref 5–12)
NEUTROPHILS NFR BLD AUTO: 4.08 10*3/MM3 (ref 1.7–7)
NEUTROPHILS NFR BLD AUTO: 67 % (ref 42.7–76)
NRBC BLD AUTO-RTO: 0.2 /100 WBC (ref 0–0.2)
PLATELET # BLD AUTO: 122 10*3/MM3 (ref 140–450)
PMV BLD AUTO: 9.7 FL (ref 6–12)
POTASSIUM SERPL-SCNC: 4.1 MMOL/L (ref 3.5–5.2)
PROT SERPL-MCNC: 7 G/DL (ref 6–8.5)
PROT UR-MCNC: 22 MG/DL
PROT/CREAT UR: 89.8 MG/G CREA (ref 0–200)
RBC # BLD AUTO: 4.24 10*6/MM3 (ref 3.77–5.28)
SODIUM SERPL-SCNC: 144 MMOL/L (ref 136–145)
TRIGL SERPL-MCNC: 182 MG/DL (ref 0–150)
WBC # BLD AUTO: 6.09 10*3/MM3 (ref 3.4–10.8)

## 2020-10-12 PROCEDURE — 82570 ASSAY OF URINE CREATININE: CPT

## 2020-10-12 PROCEDURE — 82465 ASSAY BLD/SERUM CHOLESTEROL: CPT

## 2020-10-12 PROCEDURE — 84156 ASSAY OF PROTEIN URINE: CPT

## 2020-10-12 PROCEDURE — 84478 ASSAY OF TRIGLYCERIDES: CPT

## 2020-10-12 PROCEDURE — 80053 COMPREHEN METABOLIC PANEL: CPT

## 2020-10-12 PROCEDURE — 87799 DETECT AGENT NOS DNA QUANT: CPT

## 2020-10-12 PROCEDURE — 36415 COLL VENOUS BLD VENIPUNCTURE: CPT

## 2020-10-12 PROCEDURE — 85025 COMPLETE CBC W/AUTO DIFF WBC: CPT

## 2020-10-12 PROCEDURE — 80197 ASSAY OF TACROLIMUS: CPT

## 2020-10-14 ENCOUNTER — HOSPITAL ENCOUNTER (OUTPATIENT)
Dept: CARDIOLOGY | Facility: HOSPITAL | Age: 75
Discharge: HOME | End: 2020-10-14

## 2020-10-14 DIAGNOSIS — R60.0 BILATERAL LOWER EXTREMITY EDEMA: ICD-10-CM

## 2020-10-14 DIAGNOSIS — R06.00 DYSPNEA, UNSPECIFIED TYPE: ICD-10-CM

## 2020-10-14 LAB
BH CV ECHO MEAS - ACS: 2.3 CM
BH CV ECHO MEAS - AO MAX PG (FULL): 6 MMHG
BH CV ECHO MEAS - AO MAX PG: 13.2 MMHG
BH CV ECHO MEAS - AO MEAN PG (FULL): 2 MMHG
BH CV ECHO MEAS - AO MEAN PG: 5.5 MMHG
BH CV ECHO MEAS - AO ROOT AREA (BSA CORRECTED): 1.5
BH CV ECHO MEAS - AO ROOT AREA: 6.2 CM^2
BH CV ECHO MEAS - AO ROOT DIAM: 2.8 CM
BH CV ECHO MEAS - AO V2 MAX: 181.8 CM/SEC
BH CV ECHO MEAS - AO V2 MEAN: 106 CM/SEC
BH CV ECHO MEAS - AO V2 VTI: 35.7 CM
BH CV ECHO MEAS - ASC AORTA: 2.6 CM
BH CV ECHO MEAS - AVA(I,A): 2.6 CM^2
BH CV ECHO MEAS - AVA(I,D): 2.6 CM^2
BH CV ECHO MEAS - AVA(V,A): 2.1 CM^2
BH CV ECHO MEAS - AVA(V,D): 2.1 CM^2
BH CV ECHO MEAS - BSA(HAYCOCK): 2 M^2
BH CV ECHO MEAS - BSA: 1.9 M^2
BH CV ECHO MEAS - BZI_BMI: 29.1 KILOGRAMS/M^2
BH CV ECHO MEAS - BZI_METRIC_HEIGHT: 167.6 CM
BH CV ECHO MEAS - BZI_METRIC_WEIGHT: 81.6 KG
BH CV ECHO MEAS - EDV(CUBED): 102.9 ML
BH CV ECHO MEAS - EDV(MOD-SP4): 66.1 ML
BH CV ECHO MEAS - EDV(TEICH): 101.6 ML
BH CV ECHO MEAS - EF(CUBED): 81.8 %
BH CV ECHO MEAS - EF(MOD-SP4): 70.6 %
BH CV ECHO MEAS - EF(TEICH): 74.5 %
BH CV ECHO MEAS - ESV(CUBED): 18.7 ML
BH CV ECHO MEAS - ESV(MOD-SP4): 19.5 ML
BH CV ECHO MEAS - ESV(TEICH): 25.9 ML
BH CV ECHO MEAS - FS: 43.4 %
BH CV ECHO MEAS - IVS/LVPW: 0.82
BH CV ECHO MEAS - IVSD: 0.94 CM
BH CV ECHO MEAS - LV DIASTOLIC VOL/BSA (35-75): 34.5 ML/M^2
BH CV ECHO MEAS - LV MASS(C)D: 174.3 GRAMS
BH CV ECHO MEAS - LV MASS(C)DI: 91.1 GRAMS/M^2
BH CV ECHO MEAS - LV MAX PG: 7.3 MMHG
BH CV ECHO MEAS - LV MEAN PG: 3.4 MMHG
BH CV ECHO MEAS - LV SYSTOLIC VOL/BSA (12-30): 10.2 ML/M^2
BH CV ECHO MEAS - LV V1 MAX: 134.7 CM/SEC
BH CV ECHO MEAS - LV V1 MEAN: 84.8 CM/SEC
BH CV ECHO MEAS - LV V1 VTI: 32.1 CM
BH CV ECHO MEAS - LVIDD: 4.7 CM
BH CV ECHO MEAS - LVIDS: 2.7 CM
BH CV ECHO MEAS - LVOT AREA: 2.9 CM^2
BH CV ECHO MEAS - LVOT DIAM: 1.9 CM
BH CV ECHO MEAS - LVPWD: 1.2 CM
BH CV ECHO MEAS - MR MAX PG: 76.4 MMHG
BH CV ECHO MEAS - MR MAX VEL: 436.9 CM/SEC
BH CV ECHO MEAS - MV A MAX VEL: 123 CM/SEC
BH CV ECHO MEAS - MV DEC SLOPE: 347.5 CM/SEC^2
BH CV ECHO MEAS - MV DEC TIME: 0.34 SEC
BH CV ECHO MEAS - MV E MAX VEL: 118.7 CM/SEC
BH CV ECHO MEAS - MV E/A: 0.96
BH CV ECHO MEAS - MV MAX PG: 7 MMHG
BH CV ECHO MEAS - MV MEAN PG: 2.5 MMHG
BH CV ECHO MEAS - MV V2 MAX: 132.5 CM/SEC
BH CV ECHO MEAS - MV V2 MEAN: 73.3 CM/SEC
BH CV ECHO MEAS - MV V2 VTI: 42.6 CM
BH CV ECHO MEAS - MVA(VTI): 2.2 CM^2
BH CV ECHO MEAS - PA ACC TIME: 0.07 SEC
BH CV ECHO MEAS - PA MAX PG (FULL): 3.7 MMHG
BH CV ECHO MEAS - PA MAX PG: 5.4 MMHG
BH CV ECHO MEAS - PA MEAN PG (FULL): 1.5 MMHG
BH CV ECHO MEAS - PA MEAN PG: 2.5 MMHG
BH CV ECHO MEAS - PA PR(ACCEL): 45.8 MMHG
BH CV ECHO MEAS - PA V2 MAX: 116.6 CM/SEC
BH CV ECHO MEAS - PA V2 MEAN: 72.4 CM/SEC
BH CV ECHO MEAS - PA V2 VTI: 25.4 CM
BH CV ECHO MEAS - PULM A REVS DUR: 0.1 SEC
BH CV ECHO MEAS - PULM A REVS VEL: 24.9 CM/SEC
BH CV ECHO MEAS - PULM DIAS VEL: 41.1 CM/SEC
BH CV ECHO MEAS - PULM S/D: 1.3
BH CV ECHO MEAS - PULM SYS VEL: 53 CM/SEC
BH CV ECHO MEAS - PVA(I,A): 5 CM^2
BH CV ECHO MEAS - PVA(I,D): 5 CM^2
BH CV ECHO MEAS - PVA(V,A): 3.8 CM^2
BH CV ECHO MEAS - PVA(V,D): 3.8 CM^2
BH CV ECHO MEAS - QP/QS: 1.4
BH CV ECHO MEAS - RAP SYSTOLE: 3 MMHG
BH CV ECHO MEAS - RV MAX PG: 1.8 MMHG
BH CV ECHO MEAS - RV MEAN PG: 1 MMHG
BH CV ECHO MEAS - RV V1 MAX: 66.7 CM/SEC
BH CV ECHO MEAS - RV V1 MEAN: 48.5 CM/SEC
BH CV ECHO MEAS - RV V1 VTI: 19.5 CM
BH CV ECHO MEAS - RVDD: 2.4 CM
BH CV ECHO MEAS - RVOT AREA: 6.6 CM^2
BH CV ECHO MEAS - RVOT DIAM: 2.9 CM
BH CV ECHO MEAS - RVSP: 36 MMHG
BH CV ECHO MEAS - SI(AO): 116 ML/M^2
BH CV ECHO MEAS - SI(CUBED): 44 ML/M^2
BH CV ECHO MEAS - SI(LVOT): 48 ML/M^2
BH CV ECHO MEAS - SI(MOD-SP4): 24.4 ML/M^2
BH CV ECHO MEAS - SI(TEICH): 39.6 ML/M^2
BH CV ECHO MEAS - SV(AO): 221.9 ML
BH CV ECHO MEAS - SV(CUBED): 84.2 ML
BH CV ECHO MEAS - SV(LVOT): 91.9 ML
BH CV ECHO MEAS - SV(MOD-SP4): 46.6 ML
BH CV ECHO MEAS - SV(RVOT): 127.8 ML
BH CV ECHO MEAS - SV(TEICH): 75.8 ML
BH CV ECHO MEAS - TR MAX VEL: 287.1 CM/SEC
BH CV LOWER VASCULAR LEFT COMMON FEMORAL AUGMENT: NORMAL
BH CV LOWER VASCULAR LEFT COMMON FEMORAL COMPETENT: NORMAL
BH CV LOWER VASCULAR LEFT COMMON FEMORAL COMPRESS: NORMAL
BH CV LOWER VASCULAR LEFT COMMON FEMORAL PHASIC: NORMAL
BH CV LOWER VASCULAR LEFT COMMON FEMORAL SPONT: NORMAL
BH CV LOWER VASCULAR LEFT DISTAL FEMORAL COMPRESS: NORMAL
BH CV LOWER VASCULAR LEFT GASTRONEMIUS COMPRESS: NORMAL
BH CV LOWER VASCULAR LEFT GREATER SAPH AK COMPRESS: NORMAL
BH CV LOWER VASCULAR LEFT GREATER SAPH BK COMPRESS: NORMAL
BH CV LOWER VASCULAR LEFT LESSER SAPH COMPRESS: NORMAL
BH CV LOWER VASCULAR LEFT MID FEMORAL AUGMENT: NORMAL
BH CV LOWER VASCULAR LEFT MID FEMORAL COMPETENT: NORMAL
BH CV LOWER VASCULAR LEFT MID FEMORAL COMPRESS: NORMAL
BH CV LOWER VASCULAR LEFT MID FEMORAL PHASIC: NORMAL
BH CV LOWER VASCULAR LEFT MID FEMORAL SPONT: NORMAL
BH CV LOWER VASCULAR LEFT PERONEAL COMPRESS: NORMAL
BH CV LOWER VASCULAR LEFT POPLITEAL AUGMENT: NORMAL
BH CV LOWER VASCULAR LEFT POPLITEAL COMPETENT: NORMAL
BH CV LOWER VASCULAR LEFT POPLITEAL COMPRESS: NORMAL
BH CV LOWER VASCULAR LEFT POPLITEAL PHASIC: NORMAL
BH CV LOWER VASCULAR LEFT POPLITEAL SPONT: NORMAL
BH CV LOWER VASCULAR LEFT POSTERIOR TIBIAL COMPRESS: NORMAL
BH CV LOWER VASCULAR LEFT PROXIMAL FEMORAL COMPRESS: NORMAL
BH CV LOWER VASCULAR LEFT SAPHENOFEMORAL JUNCTION COMPRESS: NORMAL
BH CV LOWER VASCULAR RIGHT COMMON FEMORAL AUGMENT: NORMAL
BH CV LOWER VASCULAR RIGHT COMMON FEMORAL COMPETENT: NORMAL
BH CV LOWER VASCULAR RIGHT COMMON FEMORAL COMPRESS: NORMAL
BH CV LOWER VASCULAR RIGHT COMMON FEMORAL PHASIC: NORMAL
BH CV LOWER VASCULAR RIGHT COMMON FEMORAL SPONT: NORMAL
BH CV LOWER VASCULAR RIGHT DISTAL FEMORAL COMPRESS: NORMAL
BH CV LOWER VASCULAR RIGHT GASTRONEMIUS COMPRESS: NORMAL
BH CV LOWER VASCULAR RIGHT GREATER SAPH AK COMPRESS: NORMAL
BH CV LOWER VASCULAR RIGHT GREATER SAPH BK COMPRESS: NORMAL
BH CV LOWER VASCULAR RIGHT LESSER SAPH COMPRESS: NORMAL
BH CV LOWER VASCULAR RIGHT MID FEMORAL AUGMENT: NORMAL
BH CV LOWER VASCULAR RIGHT MID FEMORAL COMPETENT: NORMAL
BH CV LOWER VASCULAR RIGHT MID FEMORAL COMPRESS: NORMAL
BH CV LOWER VASCULAR RIGHT MID FEMORAL PHASIC: NORMAL
BH CV LOWER VASCULAR RIGHT MID FEMORAL SPONT: NORMAL
BH CV LOWER VASCULAR RIGHT PERONEAL COMPRESS: NORMAL
BH CV LOWER VASCULAR RIGHT POPLITEAL AUGMENT: NORMAL
BH CV LOWER VASCULAR RIGHT POPLITEAL COMPETENT: NORMAL
BH CV LOWER VASCULAR RIGHT POPLITEAL COMPRESS: NORMAL
BH CV LOWER VASCULAR RIGHT POPLITEAL PHASIC: NORMAL
BH CV LOWER VASCULAR RIGHT POPLITEAL SPONT: NORMAL
BH CV LOWER VASCULAR RIGHT POSTERIOR TIBIAL COMPRESS: NORMAL
BH CV LOWER VASCULAR RIGHT PROXIMAL FEMORAL COMPRESS: NORMAL
BH CV LOWER VASCULAR RIGHT SAPHENOFEMORAL JUNCTION COMPRESS: NORMAL
BKV DNA # SERPL NAA+PROBE: NEGATIVE COPIES/ML
BKV DNA SERPL NAA+PROBE-LOG#: NORMAL {LOG_COPIES}/ML
LV EF 2D ECHO EST: 70 %

## 2020-10-14 PROCEDURE — 93306 TTE W/DOPPLER COMPLETE: CPT

## 2020-10-14 PROCEDURE — 93306 TTE W/DOPPLER COMPLETE: CPT | Performed by: INTERNAL MEDICINE

## 2020-10-14 PROCEDURE — 93970 EXTREMITY STUDY: CPT

## 2020-10-15 LAB
BKV DNA # UR NAA+PROBE: NORMAL COPIES/ML
BKV DNA SPEC NAA+PROBE-LOG#: 5.28 LOG10COPY/ML
TACROLIMUS BLD LC/MS/MS-MCNC: 4.8 NG/ML (ref 2–20)

## 2020-11-06 ENCOUNTER — TRANSCRIBE ORDERS (OUTPATIENT)
Dept: LAB | Facility: HOSPITAL | Age: 75
End: 2020-11-06

## 2020-11-06 ENCOUNTER — LAB (OUTPATIENT)
Dept: LAB | Facility: HOSPITAL | Age: 75
End: 2020-11-06

## 2020-11-06 DIAGNOSIS — Z94.0 KIDNEY REPLACED BY TRANSPLANT: ICD-10-CM

## 2020-11-06 DIAGNOSIS — Z94.0 KIDNEY REPLACED BY TRANSPLANT: Primary | ICD-10-CM

## 2020-11-06 LAB
25(OH)D3 SERPL-MCNC: 49.7 NG/ML (ref 30–100)
ANION GAP SERPL CALCULATED.3IONS-SCNC: 7.4 MMOL/L (ref 5–15)
BACTERIA UR QL AUTO: ABNORMAL /HPF
BASOPHILS # BLD AUTO: 0.03 10*3/MM3 (ref 0–0.2)
BASOPHILS NFR BLD AUTO: 0.6 % (ref 0–1.5)
BILIRUB UR QL STRIP: NEGATIVE
BUN SERPL-MCNC: 15 MG/DL (ref 8–23)
BUN/CREAT SERPL: 18.5 (ref 7–25)
CALCIUM SPEC-SCNC: 10.4 MG/DL (ref 8.6–10.5)
CHLORIDE SERPL-SCNC: 101 MMOL/L (ref 98–107)
CLARITY UR: ABNORMAL
CO2 SERPL-SCNC: 32.6 MMOL/L (ref 22–29)
COLOR UR: YELLOW
CREAT SERPL-MCNC: 0.81 MG/DL (ref 0.57–1)
DEPRECATED RDW RBC AUTO: 43.6 FL (ref 37–54)
EOSINOPHIL # BLD AUTO: 0.03 10*3/MM3 (ref 0–0.4)
EOSINOPHIL NFR BLD AUTO: 0.6 % (ref 0.3–6.2)
ERYTHROCYTE [DISTWIDTH] IN BLOOD BY AUTOMATED COUNT: 12.6 % (ref 12.3–15.4)
GFR SERPL CREATININE-BSD FRML MDRD: 69 ML/MIN/1.73
GLUCOSE SERPL-MCNC: 89 MG/DL (ref 65–99)
GLUCOSE UR STRIP-MCNC: NEGATIVE MG/DL
HCT VFR BLD AUTO: 38.8 % (ref 34–46.6)
HGB BLD-MCNC: 13.3 G/DL (ref 12–15.9)
HGB UR QL STRIP.AUTO: NEGATIVE
HYALINE CASTS UR QL AUTO: ABNORMAL /LPF
IMM GRANULOCYTES # BLD AUTO: 0.03 10*3/MM3 (ref 0–0.05)
IMM GRANULOCYTES NFR BLD AUTO: 0.6 % (ref 0–0.5)
KETONES UR QL STRIP: NEGATIVE
LEUKOCYTE ESTERASE UR QL STRIP.AUTO: ABNORMAL
LYMPHOCYTES # BLD AUTO: 0.74 10*3/MM3 (ref 0.7–3.1)
LYMPHOCYTES NFR BLD AUTO: 14.5 % (ref 19.6–45.3)
MCH RBC QN AUTO: 32.6 PG (ref 26.6–33)
MCHC RBC AUTO-ENTMCNC: 34.3 G/DL (ref 31.5–35.7)
MCV RBC AUTO: 95.1 FL (ref 79–97)
MONOCYTES # BLD AUTO: 0.66 10*3/MM3 (ref 0.1–0.9)
MONOCYTES NFR BLD AUTO: 12.9 % (ref 5–12)
NEUTROPHILS NFR BLD AUTO: 3.62 10*3/MM3 (ref 1.7–7)
NEUTROPHILS NFR BLD AUTO: 70.8 % (ref 42.7–76)
NITRITE UR QL STRIP: POSITIVE
NRBC BLD AUTO-RTO: 0 /100 WBC (ref 0–0.2)
PH UR STRIP.AUTO: 6.5 [PH] (ref 5–8)
PLATELET # BLD AUTO: 116 10*3/MM3 (ref 140–450)
PMV BLD AUTO: 9.3 FL (ref 6–12)
POTASSIUM SERPL-SCNC: 3.9 MMOL/L (ref 3.5–5.2)
PROT UR QL STRIP: NEGATIVE
RBC # BLD AUTO: 4.08 10*6/MM3 (ref 3.77–5.28)
RBC # UR: ABNORMAL /HPF
REF LAB TEST METHOD: ABNORMAL
SODIUM SERPL-SCNC: 141 MMOL/L (ref 136–145)
SP GR UR STRIP: 1.02 (ref 1–1.03)
SQUAMOUS #/AREA URNS HPF: ABNORMAL /HPF
UROBILINOGEN UR QL STRIP: ABNORMAL
WBC # BLD AUTO: 5.11 10*3/MM3 (ref 3.4–10.8)
WBC UR QL AUTO: ABNORMAL /HPF

## 2020-11-06 PROCEDURE — 80197 ASSAY OF TACROLIMUS: CPT

## 2020-11-06 PROCEDURE — 81001 URINALYSIS AUTO W/SCOPE: CPT

## 2020-11-06 PROCEDURE — 87077 CULTURE AEROBIC IDENTIFY: CPT

## 2020-11-06 PROCEDURE — 80048 BASIC METABOLIC PNL TOTAL CA: CPT

## 2020-11-06 PROCEDURE — 87186 SC STD MICRODIL/AGAR DIL: CPT

## 2020-11-06 PROCEDURE — 85025 COMPLETE CBC W/AUTO DIFF WBC: CPT

## 2020-11-06 PROCEDURE — 87086 URINE CULTURE/COLONY COUNT: CPT

## 2020-11-06 PROCEDURE — 82306 VITAMIN D 25 HYDROXY: CPT

## 2020-11-06 PROCEDURE — 36415 COLL VENOUS BLD VENIPUNCTURE: CPT

## 2020-11-08 LAB — BACTERIA SPEC AEROBE CULT: ABNORMAL

## 2020-11-09 LAB — TACROLIMUS BLD LC/MS/MS-MCNC: 4.4 NG/ML (ref 2–20)

## 2021-02-22 ENCOUNTER — LAB (OUTPATIENT)
Dept: LAB | Facility: HOSPITAL | Age: 76
End: 2021-02-22

## 2021-02-22 DIAGNOSIS — Z94.0 KIDNEY REPLACED BY TRANSPLANT: ICD-10-CM

## 2021-02-22 DIAGNOSIS — Z00.00 ROUTINE GENERAL MEDICAL EXAMINATION AT A HEALTH CARE FACILITY: ICD-10-CM

## 2021-02-22 DIAGNOSIS — Z79.891 ENCOUNTER FOR LONG-TERM METHADONE USE: ICD-10-CM

## 2021-02-22 LAB
ALBUMIN SERPL-MCNC: 4.1 G/DL (ref 3.5–5.2)
ALBUMIN/GLOB SERPL: 1.9 G/DL
ALP SERPL-CCNC: 46 U/L (ref 39–117)
ALT SERPL W P-5'-P-CCNC: 6 U/L (ref 1–33)
ANION GAP SERPL CALCULATED.3IONS-SCNC: 7.6 MMOL/L (ref 5–15)
AST SERPL-CCNC: 15 U/L (ref 1–32)
BASOPHILS # BLD AUTO: 0.03 10*3/MM3 (ref 0–0.2)
BASOPHILS NFR BLD AUTO: 0.8 % (ref 0–1.5)
BILIRUB SERPL-MCNC: 0.7 MG/DL (ref 0–1.2)
BUN SERPL-MCNC: 14 MG/DL (ref 8–23)
BUN/CREAT SERPL: 20.6 (ref 7–25)
CALCIUM SPEC-SCNC: 10.3 MG/DL (ref 8.6–10.5)
CHLORIDE SERPL-SCNC: 103 MMOL/L (ref 98–107)
CHOLEST SERPL-MCNC: 168 MG/DL (ref 0–200)
CO2 SERPL-SCNC: 30.4 MMOL/L (ref 22–29)
CREAT SERPL-MCNC: 0.68 MG/DL (ref 0.57–1)
CREAT UR-MCNC: 157.3 MG/DL
DEPRECATED RDW RBC AUTO: 45.2 FL (ref 37–54)
EOSINOPHIL # BLD AUTO: 0.04 10*3/MM3 (ref 0–0.4)
EOSINOPHIL NFR BLD AUTO: 1 % (ref 0.3–6.2)
ERYTHROCYTE [DISTWIDTH] IN BLOOD BY AUTOMATED COUNT: 12.8 % (ref 12.3–15.4)
GFR SERPL CREATININE-BSD FRML MDRD: 85 ML/MIN/1.73
GLOBULIN UR ELPH-MCNC: 2.2 GM/DL
GLUCOSE SERPL-MCNC: 77 MG/DL (ref 65–99)
HCT VFR BLD AUTO: 37.6 % (ref 34–46.6)
HGB BLD-MCNC: 12.6 G/DL (ref 12–15.9)
IMM GRANULOCYTES # BLD AUTO: 0.02 10*3/MM3 (ref 0–0.05)
IMM GRANULOCYTES NFR BLD AUTO: 0.5 % (ref 0–0.5)
LYMPHOCYTES # BLD AUTO: 0.77 10*3/MM3 (ref 0.7–3.1)
LYMPHOCYTES NFR BLD AUTO: 19.4 % (ref 19.6–45.3)
MCH RBC QN AUTO: 33 PG (ref 26.6–33)
MCHC RBC AUTO-ENTMCNC: 33.5 G/DL (ref 31.5–35.7)
MCV RBC AUTO: 98.4 FL (ref 79–97)
MONOCYTES # BLD AUTO: 0.6 10*3/MM3 (ref 0.1–0.9)
MONOCYTES NFR BLD AUTO: 15.1 % (ref 5–12)
NEUTROPHILS NFR BLD AUTO: 2.51 10*3/MM3 (ref 1.7–7)
NEUTROPHILS NFR BLD AUTO: 63.2 % (ref 42.7–76)
NRBC BLD AUTO-RTO: 0 /100 WBC (ref 0–0.2)
PLATELET # BLD AUTO: 106 10*3/MM3 (ref 140–450)
PMV BLD AUTO: 9.6 FL (ref 6–12)
POTASSIUM SERPL-SCNC: 3.8 MMOL/L (ref 3.5–5.2)
PROT SERPL-MCNC: 6.3 G/DL (ref 6–8.5)
PROT UR-MCNC: 32 MG/DL
PROT/CREAT UR: 203.4 MG/G CREA (ref 0–200)
RBC # BLD AUTO: 3.82 10*6/MM3 (ref 3.77–5.28)
SODIUM SERPL-SCNC: 141 MMOL/L (ref 136–145)
TRIGL SERPL-MCNC: 149 MG/DL (ref 0–150)
WBC # BLD AUTO: 3.97 10*3/MM3 (ref 3.4–10.8)

## 2021-02-22 PROCEDURE — 80053 COMPREHEN METABOLIC PANEL: CPT

## 2021-02-22 PROCEDURE — 85025 COMPLETE CBC W/AUTO DIFF WBC: CPT

## 2021-02-22 PROCEDURE — 84478 ASSAY OF TRIGLYCERIDES: CPT

## 2021-02-22 PROCEDURE — 87799 DETECT AGENT NOS DNA QUANT: CPT

## 2021-02-22 PROCEDURE — 82465 ASSAY BLD/SERUM CHOLESTEROL: CPT

## 2021-02-22 PROCEDURE — 84156 ASSAY OF PROTEIN URINE: CPT

## 2021-02-22 PROCEDURE — 36415 COLL VENOUS BLD VENIPUNCTURE: CPT

## 2021-02-22 PROCEDURE — 80197 ASSAY OF TACROLIMUS: CPT

## 2021-02-22 PROCEDURE — 82570 ASSAY OF URINE CREATININE: CPT

## 2021-02-24 LAB
BKV DNA # SERPL NAA+PROBE: NEGATIVE COPIES/ML
BKV DNA SERPL NAA+PROBE-LOG#: NORMAL {LOG_COPIES}/ML

## 2021-02-25 LAB — TACROLIMUS BLD LC/MS/MS-MCNC: 5.1 NG/ML (ref 2–20)

## 2021-05-06 ENCOUNTER — LAB (OUTPATIENT)
Dept: LAB | Facility: HOSPITAL | Age: 76
End: 2021-05-06

## 2021-05-06 DIAGNOSIS — Z79.891 ENCOUNTER FOR LONG-TERM METHADONE USE: ICD-10-CM

## 2021-05-06 DIAGNOSIS — Z00.00 ROUTINE GENERAL MEDICAL EXAMINATION AT A HEALTH CARE FACILITY: ICD-10-CM

## 2021-05-06 DIAGNOSIS — Z94.0 KIDNEY REPLACED BY TRANSPLANT: ICD-10-CM

## 2021-05-06 LAB
ALBUMIN SERPL-MCNC: 4.1 G/DL (ref 3.5–5.2)
ALBUMIN/GLOB SERPL: 1.6 G/DL
ALP SERPL-CCNC: 52 U/L (ref 39–117)
ALT SERPL W P-5'-P-CCNC: 7 U/L (ref 1–33)
ANION GAP SERPL CALCULATED.3IONS-SCNC: 8.5 MMOL/L (ref 5–15)
AST SERPL-CCNC: 16 U/L (ref 1–32)
BASOPHILS # BLD AUTO: 0.04 10*3/MM3 (ref 0–0.2)
BASOPHILS NFR BLD AUTO: 0.5 % (ref 0–1.5)
BILIRUB SERPL-MCNC: 0.8 MG/DL (ref 0–1.2)
BUN SERPL-MCNC: 14 MG/DL (ref 8–23)
BUN/CREAT SERPL: 21.2 (ref 7–25)
CALCIUM SPEC-SCNC: 10.5 MG/DL (ref 8.6–10.5)
CHLORIDE SERPL-SCNC: 105 MMOL/L (ref 98–107)
CHOLEST SERPL-MCNC: 174 MG/DL (ref 0–200)
CO2 SERPL-SCNC: 30.5 MMOL/L (ref 22–29)
CREAT SERPL-MCNC: 0.66 MG/DL (ref 0.57–1)
CREAT UR-MCNC: 65.8 MG/DL
DEPRECATED RDW RBC AUTO: 45.8 FL (ref 37–54)
EOSINOPHIL # BLD AUTO: 0.02 10*3/MM3 (ref 0–0.4)
EOSINOPHIL NFR BLD AUTO: 0.3 % (ref 0.3–6.2)
ERYTHROCYTE [DISTWIDTH] IN BLOOD BY AUTOMATED COUNT: 13.1 % (ref 12.3–15.4)
GFR SERPL CREATININE-BSD FRML MDRD: 88 ML/MIN/1.73
GLOBULIN UR ELPH-MCNC: 2.6 GM/DL
GLUCOSE SERPL-MCNC: 100 MG/DL (ref 65–99)
HCT VFR BLD AUTO: 37.2 % (ref 34–46.6)
HGB BLD-MCNC: 12.5 G/DL (ref 12–15.9)
IMM GRANULOCYTES # BLD AUTO: 0.02 10*3/MM3 (ref 0–0.05)
IMM GRANULOCYTES NFR BLD AUTO: 0.3 % (ref 0–0.5)
LYMPHOCYTES # BLD AUTO: 0.5 10*3/MM3 (ref 0.7–3.1)
LYMPHOCYTES NFR BLD AUTO: 6.9 % (ref 19.6–45.3)
MCH RBC QN AUTO: 32.8 PG (ref 26.6–33)
MCHC RBC AUTO-ENTMCNC: 33.6 G/DL (ref 31.5–35.7)
MCV RBC AUTO: 97.6 FL (ref 79–97)
MONOCYTES # BLD AUTO: 0.59 10*3/MM3 (ref 0.1–0.9)
MONOCYTES NFR BLD AUTO: 8.1 % (ref 5–12)
NEUTROPHILS NFR BLD AUTO: 6.12 10*3/MM3 (ref 1.7–7)
NEUTROPHILS NFR BLD AUTO: 83.9 % (ref 42.7–76)
NRBC BLD AUTO-RTO: 0 /100 WBC (ref 0–0.2)
PLATELET # BLD AUTO: 131 10*3/MM3 (ref 140–450)
PMV BLD AUTO: 9.8 FL (ref 6–12)
POTASSIUM SERPL-SCNC: 4.1 MMOL/L (ref 3.5–5.2)
PROT SERPL-MCNC: 6.7 G/DL (ref 6–8.5)
PROT UR-MCNC: 6 MG/DL
PROT/CREAT UR: 91.2 MG/G CREA (ref 0–200)
RBC # BLD AUTO: 3.81 10*6/MM3 (ref 3.77–5.28)
SODIUM SERPL-SCNC: 144 MMOL/L (ref 136–145)
TRIGL SERPL-MCNC: 146 MG/DL (ref 0–150)
WBC # BLD AUTO: 7.29 10*3/MM3 (ref 3.4–10.8)

## 2021-05-06 PROCEDURE — 84156 ASSAY OF PROTEIN URINE: CPT

## 2021-05-06 PROCEDURE — 87799 DETECT AGENT NOS DNA QUANT: CPT

## 2021-05-06 PROCEDURE — 80197 ASSAY OF TACROLIMUS: CPT

## 2021-05-06 PROCEDURE — 82570 ASSAY OF URINE CREATININE: CPT

## 2021-05-06 PROCEDURE — 80053 COMPREHEN METABOLIC PANEL: CPT

## 2021-05-06 PROCEDURE — 82465 ASSAY BLD/SERUM CHOLESTEROL: CPT

## 2021-05-06 PROCEDURE — 85025 COMPLETE CBC W/AUTO DIFF WBC: CPT

## 2021-05-06 PROCEDURE — 36415 COLL VENOUS BLD VENIPUNCTURE: CPT

## 2021-05-06 PROCEDURE — 84478 ASSAY OF TRIGLYCERIDES: CPT

## 2021-05-09 LAB — TACROLIMUS BLD LC/MS/MS-MCNC: 3.2 NG/ML (ref 2–20)

## 2021-05-11 LAB
BKV DNA # SERPL NAA+PROBE: NEGATIVE COPIES/ML
BKV DNA # UR NAA+PROBE: 1210 COPIES/ML
BKV DNA SERPL NAA+PROBE-LOG#: NORMAL {LOG_COPIES}/ML
BKV DNA SPEC NAA+PROBE-LOG#: 3.08 LOG10COPY/ML

## 2021-07-27 ENCOUNTER — TRANSCRIBE ORDERS (OUTPATIENT)
Dept: ADMINISTRATIVE | Facility: HOSPITAL | Age: 76
End: 2021-07-27

## 2021-07-27 ENCOUNTER — LAB (OUTPATIENT)
Dept: LAB | Facility: HOSPITAL | Age: 76
End: 2021-07-27

## 2021-07-27 DIAGNOSIS — Z94.0 TRANSPLANTED KIDNEY: ICD-10-CM

## 2021-07-27 DIAGNOSIS — Z79.891 LONG TERM PRESCRIPTION OPIATE USE: ICD-10-CM

## 2021-07-27 DIAGNOSIS — Z13.1 DIABETES MELLITUS SCREENING: ICD-10-CM

## 2021-07-27 DIAGNOSIS — E13.9 DIABETES 1.5, MANAGED AS TYPE 2 (HCC): Primary | ICD-10-CM

## 2021-07-27 DIAGNOSIS — E13.9 DIABETES 1.5, MANAGED AS TYPE 2 (HCC): ICD-10-CM

## 2021-07-27 DIAGNOSIS — Z79.891 LONG TERM (CURRENT) USE OF OPIATE ANALGESIC: ICD-10-CM

## 2021-07-27 DIAGNOSIS — Z94.0 TRANSPLANTED KIDNEY: Primary | ICD-10-CM

## 2021-07-27 LAB
ALBUMIN SERPL-MCNC: 4.1 G/DL (ref 3.5–5.2)
ALBUMIN/GLOB SERPL: 1.8 G/DL
ALP SERPL-CCNC: 44 U/L (ref 39–117)
ALT SERPL W P-5'-P-CCNC: 6 U/L (ref 1–33)
ANION GAP SERPL CALCULATED.3IONS-SCNC: 8 MMOL/L (ref 5–15)
AST SERPL-CCNC: 14 U/L (ref 1–32)
BASOPHILS # BLD AUTO: 0.04 10*3/MM3 (ref 0–0.2)
BASOPHILS NFR BLD AUTO: 0.7 % (ref 0–1.5)
BILIRUB SERPL-MCNC: 0.9 MG/DL (ref 0–1.2)
BUN SERPL-MCNC: 13 MG/DL (ref 8–23)
BUN/CREAT SERPL: 14.9 (ref 7–25)
CALCIUM SPEC-SCNC: 9.7 MG/DL (ref 8.6–10.5)
CHLORIDE SERPL-SCNC: 104 MMOL/L (ref 98–107)
CHOLEST SERPL-MCNC: 161 MG/DL (ref 0–200)
CO2 SERPL-SCNC: 31 MMOL/L (ref 22–29)
CREAT SERPL-MCNC: 0.87 MG/DL (ref 0.57–1)
CREAT UR-MCNC: 115.5 MG/DL
DEPRECATED RDW RBC AUTO: 45.3 FL (ref 37–54)
EOSINOPHIL # BLD AUTO: 0.03 10*3/MM3 (ref 0–0.4)
EOSINOPHIL NFR BLD AUTO: 0.6 % (ref 0.3–6.2)
ERYTHROCYTE [DISTWIDTH] IN BLOOD BY AUTOMATED COUNT: 12.4 % (ref 12.3–15.4)
GFR SERPL CREATININE-BSD FRML MDRD: 64 ML/MIN/1.73
GLOBULIN UR ELPH-MCNC: 2.3 GM/DL
GLUCOSE SERPL-MCNC: 82 MG/DL (ref 65–99)
HCT VFR BLD AUTO: 39 % (ref 34–46.6)
HGB BLD-MCNC: 12.9 G/DL (ref 12–15.9)
IMM GRANULOCYTES # BLD AUTO: 0.02 10*3/MM3 (ref 0–0.05)
IMM GRANULOCYTES NFR BLD AUTO: 0.4 % (ref 0–0.5)
LYMPHOCYTES # BLD AUTO: 0.49 10*3/MM3 (ref 0.7–3.1)
LYMPHOCYTES NFR BLD AUTO: 9.1 % (ref 19.6–45.3)
MCH RBC QN AUTO: 33.1 PG (ref 26.6–33)
MCHC RBC AUTO-ENTMCNC: 33.1 G/DL (ref 31.5–35.7)
MCV RBC AUTO: 100 FL (ref 79–97)
MONOCYTES # BLD AUTO: 0.52 10*3/MM3 (ref 0.1–0.9)
MONOCYTES NFR BLD AUTO: 9.7 % (ref 5–12)
NEUTROPHILS NFR BLD AUTO: 4.27 10*3/MM3 (ref 1.7–7)
NEUTROPHILS NFR BLD AUTO: 79.5 % (ref 42.7–76)
NRBC BLD AUTO-RTO: 0 /100 WBC (ref 0–0.2)
PLATELET # BLD AUTO: 109 10*3/MM3 (ref 140–450)
PMV BLD AUTO: 9.7 FL (ref 6–12)
POTASSIUM SERPL-SCNC: 4.1 MMOL/L (ref 3.5–5.2)
PROT SERPL-MCNC: 6.4 G/DL (ref 6–8.5)
PROT UR-MCNC: 10 MG/DL
PROT/CREAT UR: 86.6 MG/G CREA (ref 0–200)
RBC # BLD AUTO: 3.9 10*6/MM3 (ref 3.77–5.28)
SODIUM SERPL-SCNC: 143 MMOL/L (ref 136–145)
TRIGL SERPL-MCNC: 105 MG/DL (ref 0–150)
WBC # BLD AUTO: 5.37 10*3/MM3 (ref 3.4–10.8)

## 2021-07-27 PROCEDURE — 80053 COMPREHEN METABOLIC PANEL: CPT

## 2021-07-27 PROCEDURE — 82465 ASSAY BLD/SERUM CHOLESTEROL: CPT

## 2021-07-27 PROCEDURE — 80197 ASSAY OF TACROLIMUS: CPT

## 2021-07-27 PROCEDURE — 84478 ASSAY OF TRIGLYCERIDES: CPT

## 2021-07-27 PROCEDURE — 85025 COMPLETE CBC W/AUTO DIFF WBC: CPT

## 2021-07-27 PROCEDURE — 87799 DETECT AGENT NOS DNA QUANT: CPT

## 2021-07-27 PROCEDURE — 36415 COLL VENOUS BLD VENIPUNCTURE: CPT

## 2021-07-27 PROCEDURE — 84156 ASSAY OF PROTEIN URINE: CPT

## 2021-07-27 PROCEDURE — 82570 ASSAY OF URINE CREATININE: CPT

## 2021-07-29 LAB
BKV DNA # SERPL NAA+PROBE: NEGATIVE COPIES/ML
BKV DNA SERPL NAA+PROBE-LOG#: NORMAL {LOG_COPIES}/ML

## 2021-07-30 LAB — TACROLIMUS BLD LC/MS/MS-MCNC: 4.7 NG/ML (ref 2–20)

## 2021-10-28 ENCOUNTER — LAB (OUTPATIENT)
Dept: LAB | Facility: HOSPITAL | Age: 76
End: 2021-10-28

## 2021-10-28 DIAGNOSIS — Z79.891 LONG TERM PRESCRIPTION OPIATE USE: ICD-10-CM

## 2021-10-28 DIAGNOSIS — Z94.0 TRANSPLANTED KIDNEY: ICD-10-CM

## 2021-10-28 DIAGNOSIS — E13.9 DIABETES 1.5, MANAGED AS TYPE 2 (HCC): ICD-10-CM

## 2021-10-28 LAB
ALBUMIN SERPL-MCNC: 4 G/DL (ref 3.5–5.2)
ALBUMIN/GLOB SERPL: 1.9 G/DL
ALP SERPL-CCNC: 48 U/L (ref 39–117)
ALT SERPL W P-5'-P-CCNC: 8 U/L (ref 1–33)
ANION GAP SERPL CALCULATED.3IONS-SCNC: 7.4 MMOL/L (ref 5–15)
AST SERPL-CCNC: 14 U/L (ref 1–32)
BASOPHILS # BLD AUTO: 0.03 10*3/MM3 (ref 0–0.2)
BASOPHILS NFR BLD AUTO: 0.7 % (ref 0–1.5)
BILIRUB SERPL-MCNC: 0.9 MG/DL (ref 0–1.2)
BUN SERPL-MCNC: 12 MG/DL (ref 8–23)
BUN/CREAT SERPL: 18.2 (ref 7–25)
CALCIUM SPEC-SCNC: 9.9 MG/DL (ref 8.6–10.5)
CHLORIDE SERPL-SCNC: 104 MMOL/L (ref 98–107)
CHOLEST SERPL-MCNC: 176 MG/DL (ref 0–200)
CO2 SERPL-SCNC: 30.6 MMOL/L (ref 22–29)
CREAT SERPL-MCNC: 0.66 MG/DL (ref 0.57–1)
CREAT UR-MCNC: 118.8 MG/DL
DEPRECATED RDW RBC AUTO: 44.4 FL (ref 37–54)
EOSINOPHIL # BLD AUTO: 0.04 10*3/MM3 (ref 0–0.4)
EOSINOPHIL NFR BLD AUTO: 1 % (ref 0.3–6.2)
ERYTHROCYTE [DISTWIDTH] IN BLOOD BY AUTOMATED COUNT: 12.1 % (ref 12.3–15.4)
GFR SERPL CREATININE-BSD FRML MDRD: 88 ML/MIN/1.73
GLOBULIN UR ELPH-MCNC: 2.1 GM/DL
GLUCOSE SERPL-MCNC: 89 MG/DL (ref 65–99)
HCT VFR BLD AUTO: 39.5 % (ref 34–46.6)
HGB BLD-MCNC: 12.7 G/DL (ref 12–15.9)
IMM GRANULOCYTES # BLD AUTO: 0.01 10*3/MM3 (ref 0–0.05)
IMM GRANULOCYTES NFR BLD AUTO: 0.2 % (ref 0–0.5)
LYMPHOCYTES # BLD AUTO: 0.73 10*3/MM3 (ref 0.7–3.1)
LYMPHOCYTES NFR BLD AUTO: 17.9 % (ref 19.6–45.3)
MCH RBC QN AUTO: 31.8 PG (ref 26.6–33)
MCHC RBC AUTO-ENTMCNC: 32.2 G/DL (ref 31.5–35.7)
MCV RBC AUTO: 99 FL (ref 79–97)
MONOCYTES # BLD AUTO: 0.53 10*3/MM3 (ref 0.1–0.9)
MONOCYTES NFR BLD AUTO: 13 % (ref 5–12)
NEUTROPHILS NFR BLD AUTO: 2.73 10*3/MM3 (ref 1.7–7)
NEUTROPHILS NFR BLD AUTO: 67.2 % (ref 42.7–76)
NRBC BLD AUTO-RTO: 0 /100 WBC (ref 0–0.2)
PLATELET # BLD AUTO: 123 10*3/MM3 (ref 140–450)
PMV BLD AUTO: 9.7 FL (ref 6–12)
POTASSIUM SERPL-SCNC: 3.8 MMOL/L (ref 3.5–5.2)
PROT SERPL-MCNC: 6.1 G/DL (ref 6–8.5)
PROT UR-MCNC: 36.2 MG/DL
PROT/CREAT UR: 304.7 MG/G CREA (ref 0–200)
RBC # BLD AUTO: 3.99 10*6/MM3 (ref 3.77–5.28)
SODIUM SERPL-SCNC: 142 MMOL/L (ref 136–145)
TRIGL SERPL-MCNC: 80 MG/DL (ref 0–150)
WBC # BLD AUTO: 4.07 10*3/MM3 (ref 3.4–10.8)

## 2021-10-28 PROCEDURE — 84478 ASSAY OF TRIGLYCERIDES: CPT

## 2021-10-28 PROCEDURE — 80197 ASSAY OF TACROLIMUS: CPT

## 2021-10-28 PROCEDURE — 80053 COMPREHEN METABOLIC PANEL: CPT

## 2021-10-28 PROCEDURE — 82570 ASSAY OF URINE CREATININE: CPT

## 2021-10-28 PROCEDURE — 82465 ASSAY BLD/SERUM CHOLESTEROL: CPT

## 2021-10-28 PROCEDURE — 87799 DETECT AGENT NOS DNA QUANT: CPT

## 2021-10-28 PROCEDURE — 85025 COMPLETE CBC W/AUTO DIFF WBC: CPT

## 2021-10-28 PROCEDURE — 36415 COLL VENOUS BLD VENIPUNCTURE: CPT

## 2021-10-28 PROCEDURE — 84156 ASSAY OF PROTEIN URINE: CPT

## 2021-10-31 LAB
BKV DNA # SERPL NAA+PROBE: NEGATIVE COPIES/ML
BKV DNA SERPL NAA+PROBE-LOG#: NORMAL {LOG_COPIES}/ML

## 2021-11-01 LAB — TACROLIMUS BLD LC/MS/MS-MCNC: 4.2 NG/ML (ref 2–20)

## 2022-02-14 ENCOUNTER — LAB (OUTPATIENT)
Dept: LAB | Facility: HOSPITAL | Age: 77
End: 2022-02-14

## 2022-02-14 ENCOUNTER — TRANSCRIBE ORDERS (OUTPATIENT)
Dept: ADMINISTRATIVE | Facility: HOSPITAL | Age: 77
End: 2022-02-14

## 2022-02-14 DIAGNOSIS — Z94.0 KIDNEY REPLACED BY TRANSPLANT: ICD-10-CM

## 2022-02-14 DIAGNOSIS — Z79.891 ENCOUNTER FOR LONG-TERM METHADONE USE: ICD-10-CM

## 2022-02-14 DIAGNOSIS — Z13.1 SCREENING FOR DIABETES MELLITUS: ICD-10-CM

## 2022-02-14 DIAGNOSIS — Z94.0 KIDNEY REPLACED BY TRANSPLANT: Primary | ICD-10-CM

## 2022-02-14 DIAGNOSIS — Z00.00 ROUTINE GENERAL MEDICAL EXAMINATION AT A HEALTH CARE FACILITY: ICD-10-CM

## 2022-02-14 LAB
ALBUMIN SERPL-MCNC: 4.1 G/DL (ref 3.5–5.2)
ALBUMIN/GLOB SERPL: 1.6 G/DL
ALP SERPL-CCNC: 61 U/L (ref 39–117)
ALT SERPL W P-5'-P-CCNC: 9 U/L (ref 1–33)
ANION GAP SERPL CALCULATED.3IONS-SCNC: 11 MMOL/L (ref 5–15)
AST SERPL-CCNC: 18 U/L (ref 1–32)
BASOPHILS # BLD AUTO: 0.03 10*3/MM3 (ref 0–0.2)
BASOPHILS NFR BLD AUTO: 0.5 % (ref 0–1.5)
BILIRUB SERPL-MCNC: 0.9 MG/DL (ref 0–1.2)
BUN SERPL-MCNC: 13 MG/DL (ref 8–23)
BUN/CREAT SERPL: 17.3 (ref 7–25)
CALCIUM SPEC-SCNC: 10 MG/DL (ref 8.6–10.5)
CHLORIDE SERPL-SCNC: 103 MMOL/L (ref 98–107)
CHOLEST SERPL-MCNC: 172 MG/DL (ref 0–200)
CO2 SERPL-SCNC: 28 MMOL/L (ref 22–29)
CREAT SERPL-MCNC: 0.75 MG/DL (ref 0.57–1)
CREAT UR-MCNC: 283 MG/DL
DEPRECATED RDW RBC AUTO: 41.4 FL (ref 37–54)
EOSINOPHIL # BLD AUTO: 0.09 10*3/MM3 (ref 0–0.4)
EOSINOPHIL NFR BLD AUTO: 1.6 % (ref 0.3–6.2)
ERYTHROCYTE [DISTWIDTH] IN BLOOD BY AUTOMATED COUNT: 12.2 % (ref 12.3–15.4)
GFR SERPL CREATININE-BSD FRML MDRD: 75 ML/MIN/1.73
GLOBULIN UR ELPH-MCNC: 2.5 GM/DL
GLUCOSE SERPL-MCNC: 101 MG/DL (ref 65–99)
HCT VFR BLD AUTO: 35.7 % (ref 34–46.6)
HGB BLD-MCNC: 12.2 G/DL (ref 12–15.9)
IMM GRANULOCYTES # BLD AUTO: 0.02 10*3/MM3 (ref 0–0.05)
IMM GRANULOCYTES NFR BLD AUTO: 0.4 % (ref 0–0.5)
LYMPHOCYTES # BLD AUTO: 0.59 10*3/MM3 (ref 0.7–3.1)
LYMPHOCYTES NFR BLD AUTO: 10.4 % (ref 19.6–45.3)
MCH RBC QN AUTO: 32.1 PG (ref 26.6–33)
MCHC RBC AUTO-ENTMCNC: 34.2 G/DL (ref 31.5–35.7)
MCV RBC AUTO: 93.9 FL (ref 79–97)
MONOCYTES # BLD AUTO: 0.68 10*3/MM3 (ref 0.1–0.9)
MONOCYTES NFR BLD AUTO: 11.9 % (ref 5–12)
NEUTROPHILS NFR BLD AUTO: 4.29 10*3/MM3 (ref 1.7–7)
NEUTROPHILS NFR BLD AUTO: 75.2 % (ref 42.7–76)
NRBC BLD AUTO-RTO: 0 /100 WBC (ref 0–0.2)
PLATELET # BLD AUTO: 142 10*3/MM3 (ref 140–450)
PMV BLD AUTO: 9.5 FL (ref 6–12)
POTASSIUM SERPL-SCNC: 3.5 MMOL/L (ref 3.5–5.2)
PROT ?TM UR-MCNC: 36.2 MG/DL
PROT SERPL-MCNC: 6.6 G/DL (ref 6–8.5)
PROT/CREAT UR: 127.9 MG/G CREA (ref 0–200)
RBC # BLD AUTO: 3.8 10*6/MM3 (ref 3.77–5.28)
SODIUM SERPL-SCNC: 142 MMOL/L (ref 136–145)
TRIGL SERPL-MCNC: 129 MG/DL (ref 0–150)
WBC NRBC COR # BLD: 5.7 10*3/MM3 (ref 3.4–10.8)

## 2022-02-14 PROCEDURE — 80053 COMPREHEN METABOLIC PANEL: CPT

## 2022-02-14 PROCEDURE — 85025 COMPLETE CBC W/AUTO DIFF WBC: CPT

## 2022-02-14 PROCEDURE — 82465 ASSAY BLD/SERUM CHOLESTEROL: CPT

## 2022-02-14 PROCEDURE — 87799 DETECT AGENT NOS DNA QUANT: CPT

## 2022-02-14 PROCEDURE — 82570 ASSAY OF URINE CREATININE: CPT

## 2022-02-14 PROCEDURE — 84478 ASSAY OF TRIGLYCERIDES: CPT

## 2022-02-14 PROCEDURE — 36415 COLL VENOUS BLD VENIPUNCTURE: CPT

## 2022-02-14 PROCEDURE — 84156 ASSAY OF PROTEIN URINE: CPT

## 2022-02-18 LAB — BKV DNA SPEC NAA+PROBE-ACNC: NEGATIVE IU/ML

## 2022-02-28 ENCOUNTER — APPOINTMENT (OUTPATIENT)
Dept: GENERAL RADIOLOGY | Facility: HOSPITAL | Age: 77
End: 2022-02-28

## 2022-02-28 PROCEDURE — 73060 X-RAY EXAM OF HUMERUS: CPT

## 2022-02-28 PROCEDURE — 99284 EMERGENCY DEPT VISIT MOD MDM: CPT

## 2022-02-28 PROCEDURE — 73110 X-RAY EXAM OF WRIST: CPT

## 2022-02-28 PROCEDURE — 73090 X-RAY EXAM OF FOREARM: CPT

## 2022-03-01 ENCOUNTER — ANESTHESIA EVENT (OUTPATIENT)
Dept: PERIOP | Facility: HOSPITAL | Age: 77
End: 2022-03-01

## 2022-03-01 ENCOUNTER — APPOINTMENT (OUTPATIENT)
Dept: GENERAL RADIOLOGY | Facility: HOSPITAL | Age: 77
End: 2022-03-01

## 2022-03-01 ENCOUNTER — APPOINTMENT (OUTPATIENT)
Dept: CT IMAGING | Facility: HOSPITAL | Age: 77
End: 2022-03-01

## 2022-03-01 ENCOUNTER — HOSPITAL ENCOUNTER (OUTPATIENT)
Facility: HOSPITAL | Age: 77
Discharge: HOME | End: 2022-03-03
Attending: EMERGENCY MEDICINE | Admitting: STUDENT IN AN ORGANIZED HEALTH CARE EDUCATION/TRAINING PROGRAM

## 2022-03-01 ENCOUNTER — ANESTHESIA (OUTPATIENT)
Dept: PERIOP | Facility: HOSPITAL | Age: 77
End: 2022-03-01

## 2022-03-01 DIAGNOSIS — S62.101A CLOSED FRACTURE OF RIGHT WRIST, INITIAL ENCOUNTER: Primary | ICD-10-CM
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
## 2022-03-01 LAB
ANION GAP SERPL CALCULATED.3IONS-SCNC: 13 MMOL/L (ref 5–15)
APTT PPP: 24.2 SECONDS (ref 24–31)
BASOPHILS # BLD AUTO: 0 10*3/MM3 (ref 0–0.2)
BASOPHILS NFR BLD AUTO: 0.6 % (ref 0–1.5)
BUN SERPL-MCNC: 12 MG/DL (ref 8–23)
BUN/CREAT SERPL: 17.6 (ref 7–25)
CALCIUM SPEC-SCNC: 10.3 MG/DL (ref 8.6–10.5)
CHLORIDE SERPL-SCNC: 101 MMOL/L (ref 98–107)
CO2 SERPL-SCNC: 26 MMOL/L (ref 22–29)
CREAT SERPL-MCNC: 0.68 MG/DL (ref 0.57–1)
DEPRECATED RDW RBC AUTO: 45.5 FL (ref 37–54)
EGFRCR SERPLBLD CKD-EPI 2021: 91 ML/MIN/1.73
EOSINOPHIL # BLD AUTO: 0 10*3/MM3 (ref 0–0.4)
EOSINOPHIL NFR BLD AUTO: 0.2 % (ref 0.3–6.2)
ERYTHROCYTE [DISTWIDTH] IN BLOOD BY AUTOMATED COUNT: 13.6 % (ref 12.3–15.4)
GLUCOSE SERPL-MCNC: 121 MG/DL (ref 65–99)
HCT VFR BLD AUTO: 34.3 % (ref 34–46.6)
HGB BLD-MCNC: 11.6 G/DL (ref 12–15.9)
INR PPP: 1 (ref 0.93–1.1)
LYMPHOCYTES # BLD AUTO: 0.5 10*3/MM3 (ref 0.7–3.1)
LYMPHOCYTES NFR BLD AUTO: 6.1 % (ref 19.6–45.3)
MCH RBC QN AUTO: 32.1 PG (ref 26.6–33)
MCHC RBC AUTO-ENTMCNC: 33.8 G/DL (ref 31.5–35.7)
MCV RBC AUTO: 95 FL (ref 79–97)
MONOCYTES # BLD AUTO: 0.9 10*3/MM3 (ref 0.1–0.9)
MONOCYTES NFR BLD AUTO: 10.5 % (ref 5–12)
NEUTROPHILS NFR BLD AUTO: 6.7 10*3/MM3 (ref 1.7–7)
NEUTROPHILS NFR BLD AUTO: 82.6 % (ref 42.7–76)
NRBC BLD AUTO-RTO: 0.1 /100 WBC (ref 0–0.2)
PLATELET # BLD AUTO: 132 10*3/MM3 (ref 140–450)
PMV BLD AUTO: 7 FL (ref 6–12)
POTASSIUM SERPL-SCNC: 4.5 MMOL/L (ref 3.5–5.2)
PROTHROMBIN TIME: 11.1 SECONDS (ref 9.6–11.7)
RBC # BLD AUTO: 3.61 10*6/MM3 (ref 3.77–5.28)
SARS-COV-2 RNA PNL SPEC NAA+PROBE: NOT DETECTED
SODIUM SERPL-SCNC: 140 MMOL/L (ref 136–145)
WBC NRBC COR # BLD: 8.1 10*3/MM3 (ref 3.4–10.8)

## 2022-03-01 PROCEDURE — 63710000001 PREDNISONE PER 5 MG: Performed by: NURSE PRACTITIONER

## 2022-03-01 PROCEDURE — 25010000002 TETANUS-DIPHTH-ACELL PERTUSSIS 5-2.5-18.5 LF-MCG/0.5 SUSPENSION PREFILLED SYRINGE: Performed by: EMERGENCY MEDICINE

## 2022-03-01 PROCEDURE — 73100 X-RAY EXAM OF WRIST: CPT

## 2022-03-01 PROCEDURE — 76000 FLUOROSCOPY <1 HR PHYS/QHP: CPT

## 2022-03-01 PROCEDURE — G0378 HOSPITAL OBSERVATION PER HR: HCPCS

## 2022-03-01 PROCEDURE — 96375 TX/PRO/DX INJ NEW DRUG ADDON: CPT

## 2022-03-01 PROCEDURE — 25010000002 HYDROMORPHONE PER 4 MG: Performed by: EMERGENCY MEDICINE

## 2022-03-01 PROCEDURE — 25010000002 ONDANSETRON PER 1 MG: Performed by: EMERGENCY MEDICINE

## 2022-03-01 PROCEDURE — A9270 NON-COVERED ITEM OR SERVICE: HCPCS | Performed by: STUDENT IN AN ORGANIZED HEALTH CARE EDUCATION/TRAINING PROGRAM

## 2022-03-01 PROCEDURE — 0 MORPHINE SULFATE 4 MG/ML SOLUTION: Performed by: NURSE PRACTITIONER

## 2022-03-01 PROCEDURE — 63710000001 ATORVASTATIN 20 MG TABLET: Performed by: STUDENT IN AN ORGANIZED HEALTH CARE EDUCATION/TRAINING PROGRAM

## 2022-03-01 PROCEDURE — 25010000002 PROPOFOL 10 MG/ML EMULSION: Performed by: ANESTHESIOLOGY

## 2022-03-01 PROCEDURE — 96376 TX/PRO/DX INJ SAME DRUG ADON: CPT

## 2022-03-01 PROCEDURE — 87635 SARS-COV-2 COVID-19 AMP PRB: CPT | Performed by: EMERGENCY MEDICINE

## 2022-03-01 PROCEDURE — 90715 TDAP VACCINE 7 YRS/> IM: CPT | Performed by: EMERGENCY MEDICINE

## 2022-03-01 PROCEDURE — 85610 PROTHROMBIN TIME: CPT | Performed by: EMERGENCY MEDICINE

## 2022-03-01 PROCEDURE — 63710000001 AZATHIOPRINE PER 50 MG: Performed by: STUDENT IN AN ORGANIZED HEALTH CARE EDUCATION/TRAINING PROGRAM

## 2022-03-01 PROCEDURE — 85025 COMPLETE CBC W/AUTO DIFF WBC: CPT | Performed by: EMERGENCY MEDICINE

## 2022-03-01 PROCEDURE — 96374 THER/PROPH/DIAG INJ IV PUSH: CPT

## 2022-03-01 PROCEDURE — 73200 CT UPPER EXTREMITY W/O DYE: CPT

## 2022-03-01 PROCEDURE — 93010 ELECTROCARDIOGRAM REPORT: CPT | Performed by: INTERNAL MEDICINE

## 2022-03-01 PROCEDURE — C9803 HOPD COVID-19 SPEC COLLECT: HCPCS

## 2022-03-01 PROCEDURE — 93005 ELECTROCARDIOGRAM TRACING: CPT | Performed by: NURSE PRACTITIONER

## 2022-03-01 PROCEDURE — 63710000001 AZATHIOPRINE PER 50 MG: Performed by: NURSE PRACTITIONER

## 2022-03-01 PROCEDURE — 25010000002 HYDROMORPHONE 1 MG/ML SOLUTION: Performed by: EMERGENCY MEDICINE

## 2022-03-01 PROCEDURE — 25010000002 MORPHINE PER 10 MG: Performed by: EMERGENCY MEDICINE

## 2022-03-01 PROCEDURE — 80048 BASIC METABOLIC PNL TOTAL CA: CPT | Performed by: EMERGENCY MEDICINE

## 2022-03-01 PROCEDURE — 63710000001 TACROLIMUS PER 1 MG: Performed by: NURSE PRACTITIONER

## 2022-03-01 PROCEDURE — 63710000001 TACROLIMUS PER 1 MG: Performed by: STUDENT IN AN ORGANIZED HEALTH CARE EDUCATION/TRAINING PROGRAM

## 2022-03-01 PROCEDURE — 63710000001 HYDROCODONE-ACETAMINOPHEN 7.5-325 MG TABLET: Performed by: STUDENT IN AN ORGANIZED HEALTH CARE EDUCATION/TRAINING PROGRAM

## 2022-03-01 PROCEDURE — 25010000002 FENTANYL CITRATE (PF) 50 MCG/ML SOLUTION: Performed by: ANESTHESIOLOGY

## 2022-03-01 PROCEDURE — 85730 THROMBOPLASTIN TIME PARTIAL: CPT | Performed by: EMERGENCY MEDICINE

## 2022-03-01 PROCEDURE — 90471 IMMUNIZATION ADMIN: CPT | Performed by: EMERGENCY MEDICINE

## 2022-03-01 PROCEDURE — 63710000001 DIAZEPAM 5 MG TABLET: Performed by: STUDENT IN AN ORGANIZED HEALTH CARE EDUCATION/TRAINING PROGRAM

## 2022-03-01 RX ORDER — ONDANSETRON 2 MG/ML
4 INJECTION INTRAMUSCULAR; INTRAVENOUS EVERY 6 HOURS PRN
Status: DISCONTINUED | OUTPATIENT
Start: 2022-03-01 | End: 2022-03-03 | Stop reason: HOSPADM

## 2022-03-01 RX ORDER — ACETAMINOPHEN 650 MG/1
650 SUPPOSITORY RECTAL EVERY 4 HOURS PRN
Status: DISCONTINUED | OUTPATIENT
Start: 2022-03-01 | End: 2022-03-03 | Stop reason: HOSPADM

## 2022-03-01 RX ORDER — HYDROCODONE BITARTRATE AND ACETAMINOPHEN 7.5; 325 MG/1; MG/1
1 TABLET ORAL EVERY 4 HOURS PRN
Status: DISCONTINUED | OUTPATIENT
Start: 2022-03-01 | End: 2022-03-03 | Stop reason: HOSPADM

## 2022-03-01 RX ORDER — FUROSEMIDE 40 MG/1
40 TABLET ORAL DAILY
Status: DISCONTINUED | OUTPATIENT
Start: 2022-03-01 | End: 2022-03-03 | Stop reason: HOSPADM

## 2022-03-01 RX ORDER — ACETAMINOPHEN 160 MG/5ML
650 SOLUTION ORAL EVERY 4 HOURS PRN
Status: DISCONTINUED | OUTPATIENT
Start: 2022-03-01 | End: 2022-03-03 | Stop reason: HOSPADM

## 2022-03-01 RX ORDER — DIAZEPAM 5 MG/1
5 TABLET ORAL 2 TIMES DAILY PRN
COMMUNITY

## 2022-03-01 RX ORDER — DIAZEPAM 5 MG/1
5 TABLET ORAL 2 TIMES DAILY PRN
Status: DISCONTINUED | OUTPATIENT
Start: 2022-03-01 | End: 2022-03-03 | Stop reason: HOSPADM

## 2022-03-01 RX ORDER — MORPHINE SULFATE 2 MG/ML
2 INJECTION, SOLUTION INTRAMUSCULAR; INTRAVENOUS ONCE
Status: COMPLETED | OUTPATIENT
Start: 2022-03-01 | End: 2022-03-01

## 2022-03-01 RX ORDER — LEVOTHYROXINE SODIUM 0.05 MG/1
50 TABLET ORAL
Status: DISCONTINUED | OUTPATIENT
Start: 2022-03-01 | End: 2022-03-03 | Stop reason: HOSPADM

## 2022-03-01 RX ORDER — HYDROMORPHONE HCL 110MG/55ML
0.5 PATIENT CONTROLLED ANALGESIA SYRINGE INTRAVENOUS ONCE
Status: COMPLETED | OUTPATIENT
Start: 2022-03-01 | End: 2022-03-01

## 2022-03-01 RX ORDER — METOPROLOL TARTRATE 50 MG/1
50 TABLET, FILM COATED ORAL DAILY
Status: DISCONTINUED | OUTPATIENT
Start: 2022-03-01 | End: 2022-03-03 | Stop reason: HOSPADM

## 2022-03-01 RX ORDER — TACROLIMUS 1 MG/1
1 CAPSULE ORAL 2 TIMES DAILY
COMMUNITY

## 2022-03-01 RX ORDER — AZATHIOPRINE 50 MG/1
50 TABLET ORAL 2 TIMES DAILY
Status: DISCONTINUED | OUTPATIENT
Start: 2022-03-01 | End: 2022-03-03 | Stop reason: HOSPADM

## 2022-03-01 RX ORDER — SODIUM CHLORIDE 9 MG/ML
INJECTION, SOLUTION INTRAVENOUS CONTINUOUS PRN
Status: DISCONTINUED | OUTPATIENT
Start: 2022-03-01 | End: 2022-03-01 | Stop reason: SURG

## 2022-03-01 RX ORDER — POTASSIUM CHLORIDE 1.5 G/1.77G
20 POWDER, FOR SOLUTION ORAL DAILY
Status: ON HOLD | COMMUNITY
End: 2022-08-12

## 2022-03-01 RX ORDER — SODIUM CHLORIDE 0.9 % (FLUSH) 0.9 %
10 SYRINGE (ML) INJECTION EVERY 12 HOURS SCHEDULED
Status: DISCONTINUED | OUTPATIENT
Start: 2022-03-01 | End: 2022-03-03 | Stop reason: HOSPADM

## 2022-03-01 RX ORDER — DEXAMETHASONE SODIUM PHOSPHATE 4 MG/ML
8 INJECTION, SOLUTION INTRA-ARTICULAR; INTRALESIONAL; INTRAMUSCULAR; INTRAVENOUS; SOFT TISSUE ONCE AS NEEDED
Status: DISCONTINUED | OUTPATIENT
Start: 2022-03-01 | End: 2022-03-01 | Stop reason: HOSPADM

## 2022-03-01 RX ORDER — ONDANSETRON 2 MG/ML
4 INJECTION INTRAMUSCULAR; INTRAVENOUS ONCE AS NEEDED
Status: DISCONTINUED | OUTPATIENT
Start: 2022-03-01 | End: 2022-03-01 | Stop reason: HOSPADM

## 2022-03-01 RX ORDER — IPRATROPIUM BROMIDE AND ALBUTEROL SULFATE 2.5; .5 MG/3ML; MG/3ML
3 SOLUTION RESPIRATORY (INHALATION) ONCE AS NEEDED
Status: DISCONTINUED | OUTPATIENT
Start: 2022-03-01 | End: 2022-03-01 | Stop reason: HOSPADM

## 2022-03-01 RX ORDER — HYDROMORPHONE HCL 110MG/55ML
0.2 PATIENT CONTROLLED ANALGESIA SYRINGE INTRAVENOUS
Status: DISCONTINUED | OUTPATIENT
Start: 2022-03-01 | End: 2022-03-01 | Stop reason: HOSPADM

## 2022-03-01 RX ORDER — ATORVASTATIN CALCIUM 20 MG/1
20 TABLET, FILM COATED ORAL NIGHTLY
Status: DISCONTINUED | OUTPATIENT
Start: 2022-03-01 | End: 2022-03-03 | Stop reason: HOSPADM

## 2022-03-01 RX ORDER — SIMVASTATIN 20 MG
20 TABLET ORAL NIGHTLY
COMMUNITY

## 2022-03-01 RX ORDER — METOPROLOL TARTRATE 50 MG/1
50 TABLET, FILM COATED ORAL DAILY
Status: ON HOLD | COMMUNITY
End: 2022-08-12

## 2022-03-01 RX ORDER — SODIUM CHLORIDE 0.9 % (FLUSH) 0.9 %
10 SYRINGE (ML) INJECTION AS NEEDED
Status: DISCONTINUED | OUTPATIENT
Start: 2022-03-01 | End: 2022-03-03 | Stop reason: HOSPADM

## 2022-03-01 RX ORDER — AZATHIOPRINE 50 MG/1
50 TABLET ORAL 2 TIMES DAILY
COMMUNITY

## 2022-03-01 RX ORDER — PREDNISONE 1 MG/1
5 TABLET ORAL DAILY
COMMUNITY

## 2022-03-01 RX ORDER — MORPHINE SULFATE 4 MG/ML
2 INJECTION, SOLUTION INTRAMUSCULAR; INTRAVENOUS
Status: DISCONTINUED | OUTPATIENT
Start: 2022-03-01 | End: 2022-03-03 | Stop reason: HOSPADM

## 2022-03-01 RX ORDER — LEVOTHYROXINE SODIUM 0.05 MG/1
50 TABLET ORAL SEE ADMIN INSTRUCTIONS
COMMUNITY

## 2022-03-01 RX ORDER — FENTANYL CITRATE 50 UG/ML
50 INJECTION, SOLUTION INTRAMUSCULAR; INTRAVENOUS
Status: DISCONTINUED | OUTPATIENT
Start: 2022-03-01 | End: 2022-03-01 | Stop reason: HOSPADM

## 2022-03-01 RX ORDER — PREDNISONE 1 MG/1
5 TABLET ORAL DAILY
Status: DISCONTINUED | OUTPATIENT
Start: 2022-03-01 | End: 2022-03-03 | Stop reason: HOSPADM

## 2022-03-01 RX ORDER — POTASSIUM CHLORIDE 1.5 G/1.77G
20 POWDER, FOR SOLUTION ORAL DAILY
Status: DISCONTINUED | OUTPATIENT
Start: 2022-03-01 | End: 2022-03-03 | Stop reason: HOSPADM

## 2022-03-01 RX ORDER — TACROLIMUS 1 MG/1
1 CAPSULE ORAL 2 TIMES DAILY
Status: DISCONTINUED | OUTPATIENT
Start: 2022-03-01 | End: 2022-03-03 | Stop reason: HOSPADM

## 2022-03-01 RX ORDER — ACETAMINOPHEN 325 MG/1
650 TABLET ORAL EVERY 4 HOURS PRN
Status: DISCONTINUED | OUTPATIENT
Start: 2022-03-01 | End: 2022-03-03 | Stop reason: HOSPADM

## 2022-03-01 RX ORDER — FUROSEMIDE 40 MG/1
40 TABLET ORAL 2 TIMES DAILY
COMMUNITY

## 2022-03-01 RX ADMIN — HYDROMORPHONE HYDROCHLORIDE 0.5 MG: 2 INJECTION INTRAMUSCULAR; INTRAVENOUS; SUBCUTANEOUS at 06:24

## 2022-03-01 RX ADMIN — PROPOFOL 50 MCG/KG/MIN: 10 INJECTION, EMULSION INTRAVENOUS at 17:43

## 2022-03-01 RX ADMIN — Medication 10 ML: at 09:33

## 2022-03-01 RX ADMIN — HYDROMORPHONE HYDROCHLORIDE 0.5 MG: 1 INJECTION, SOLUTION INTRAMUSCULAR; INTRAVENOUS; SUBCUTANEOUS at 04:46

## 2022-03-01 RX ADMIN — Medication 10 ML: at 07:57

## 2022-03-01 RX ADMIN — POTASSIUM CHLORIDE 20 MEQ: 1.5 POWDER, FOR SOLUTION ORAL at 09:32

## 2022-03-01 RX ADMIN — LEVOTHYROXINE SODIUM 50 MCG: 0.05 TABLET ORAL at 09:32

## 2022-03-01 RX ADMIN — SODIUM CHLORIDE: 0.9 INJECTION, SOLUTION INTRAVENOUS at 17:43

## 2022-03-01 RX ADMIN — ATORVASTATIN CALCIUM 20 MG: 20 TABLET, FILM COATED ORAL at 21:40

## 2022-03-01 RX ADMIN — FUROSEMIDE 40 MG: 40 TABLET ORAL at 09:32

## 2022-03-01 RX ADMIN — MORPHINE SULFATE 2 MG: 4 INJECTION INTRAVENOUS at 15:48

## 2022-03-01 RX ADMIN — METOPROLOL TARTRATE 50 MG: 50 TABLET, FILM COATED ORAL at 09:32

## 2022-03-01 RX ADMIN — PREDNISONE 5 MG: 5 TABLET ORAL at 09:32

## 2022-03-01 RX ADMIN — HYDROCODONE BITARTRATE AND ACETAMINOPHEN 1 TABLET: 7.5; 325 TABLET ORAL at 13:30

## 2022-03-01 RX ADMIN — TACROLIMUS 1 MG: 1 CAPSULE ORAL at 21:38

## 2022-03-01 RX ADMIN — MORPHINE SULFATE 2 MG: 4 INJECTION INTRAVENOUS at 12:38

## 2022-03-01 RX ADMIN — DIAZEPAM 5 MG: 5 TABLET ORAL at 21:40

## 2022-03-01 RX ADMIN — TETANUS TOXOID, REDUCED DIPHTHERIA TOXOID AND ACELLULAR PERTUSSIS VACCINE, ADSORBED 0.5 ML: 5; 2.5; 8; 8; 2.5 SUSPENSION INTRAMUSCULAR at 04:00

## 2022-03-01 RX ADMIN — FENTANYL CITRATE 50 MCG: 50 INJECTION, SOLUTION INTRAMUSCULAR; INTRAVENOUS at 18:43

## 2022-03-01 RX ADMIN — MORPHINE SULFATE 2 MG: 4 INJECTION INTRAVENOUS at 07:57

## 2022-03-01 RX ADMIN — ONDANSETRON 4 MG: 2 INJECTION INTRAMUSCULAR; INTRAVENOUS at 04:46

## 2022-03-01 RX ADMIN — HYDROCODONE BITARTRATE AND ACETAMINOPHEN 1 TABLET: 7.5; 325 TABLET ORAL at 20:30

## 2022-03-01 RX ADMIN — MORPHINE SULFATE 2 MG: 4 INJECTION INTRAVENOUS at 10:23

## 2022-03-01 RX ADMIN — AZATHIOPRINE 50 MG: 50 TABLET ORAL at 21:39

## 2022-03-01 RX ADMIN — TACROLIMUS 1 MG: 1 CAPSULE ORAL at 09:33

## 2022-03-01 RX ADMIN — MORPHINE SULFATE 2 MG: 2 INJECTION, SOLUTION INTRAMUSCULAR; INTRAVENOUS at 04:00

## 2022-03-01 RX ADMIN — HYDROCODONE BITARTRATE AND ACETAMINOPHEN 1 TABLET: 7.5; 325 TABLET ORAL at 09:32

## 2022-03-01 RX ADMIN — AZATHIOPRINE 50 MG: 50 TABLET ORAL at 09:33

## 2022-03-01 NOTE — PLAN OF CARE
Problem: Adult Inpatient Plan of Care  Goal: Plan of Care Review  Outcome: Ongoing, Progressing  Flowsheets (Taken 3/1/2022 0546)  Progress: no change  Outcome Summary: broken rt arm. to be seen by orthopedic md  Goal: Patient-Specific Goal (Individualized)  Outcome: Ongoing, Progressing  Goal: Absence of Hospital-Acquired Illness or Injury  Outcome: Ongoing, Progressing  Goal: Optimal Comfort and Wellbeing  Outcome: Ongoing, Progressing  Goal: Readiness for Transition of Care  Outcome: Ongoing, Progressing  Intervention: Mutually Develop Transition Plan  Recent Flowsheet Documentation  Taken 3/1/2022 0538 by Norah Javed, RN  Transportation Anticipated: family or friend will provide  Patient/Family Anticipated Services at Transition:   Patient/Family Anticipates Transition to: home with family  Taken 3/1/2022 0534 by Norah Javed, RN  Equipment Currently Used at Home: oxygen     Problem: Fall Injury Risk  Goal: Absence of Fall and Fall-Related Injury  Outcome: Ongoing, Progressing     Problem: Skin Injury Risk Increased  Goal: Skin Health and Integrity  Outcome: Ongoing, Progressing     Problem: Pain Acute  Goal: Optimal Pain Control  Outcome: Ongoing, Progressing   Goal Outcome Evaluation:           Progress: no change  Outcome Summary: broken rt arm. to be seen by orthopedic md

## 2022-03-01 NOTE — ED PROVIDER NOTES
Subjective   77-year-old female who slipped and fell around 9 PM.  Patient states she hit her head but there was no LOC, no concussive symptoms, no neck pain.  She complains of severe pain at the right wrist joint as well as abrasion of the right elbow.  Otherwise patient denies any recent illness, pain worse with movement.  No other associated symptoms the exception of right elbow and shoulder pain.          Review of Systems   Musculoskeletal:        As per HPI   Skin: Positive for wound.   All other systems reviewed and are negative.      No past medical history on file.    No Known Allergies    No past surgical history on file.    No family history on file.    Social History     Socioeconomic History   • Marital status:            Objective   Physical Exam  Constitutional:       Comments: Elderly female no acute distress   HENT:      Head: Normocephalic and atraumatic.      Mouth/Throat:      Mouth: Mucous membranes are moist.      Pharynx: Oropharynx is clear.   Eyes:      Extraocular Movements: Extraocular movements intact.      Conjunctiva/sclera: Conjunctivae normal.      Pupils: Pupils are equal, round, and reactive to light.   Cardiovascular:      Rate and Rhythm: Normal rate and regular rhythm.      Heart sounds: Normal heart sounds.   Pulmonary:      Effort: Pulmonary effort is normal.      Breath sounds: Normal breath sounds.   Musculoskeletal:      Cervical back: Normal range of motion and neck supple. No tenderness.      Comments: Moderate swelling of the right wrist with close deformity, mild abrasion to the right elbow, fistula right proximal forearm, positive thrill.   Skin:     Capillary Refill: Capillary refill takes less than 2 seconds.   Neurological:      Mental Status: She is oriented to person, place, and time.      Cranial Nerves: No cranial nerve deficit.      Sensory: No sensory deficit.      Motor: No weakness.   Psychiatric:         Mood and Affect: Mood normal.         Behavior:  Behavior normal.         Splint - Cast - Strapping    Date/Time: 3/1/2022 3:38 AM  Performed by: Baldev Ramirez MD  Authorized by: Baldev Ramirez MD     Consent:     Consent obtained:  Verbal    Consent given by:  Patient  Pre-procedure details:     Sensation:  Normal  Procedure details:     Laterality:  Right    Location:  Arm    Strapping: no      Cast type:  Short arm    Splint type:  Sugar tong    Supplies:  Ortho-Glass  Post-procedure details:     Pain:  Unchanged    Sensation:  Normal    Patient tolerance of procedure:  Tolerated well, no immediate complications               ED Course                                                 MDM  Number of Diagnoses or Management Options  Closed fracture of right wrist, initial encounter  Diagnosis management comments: Results for orders placed or performed during the hospital encounter of 03/01/22  -Basic Metabolic Panel:   Specimen: Blood       Result                      Value             Ref Range           Glucose                     121 (H)           65 - 99 mg/dL       BUN                         12                8 - 23 mg/dL        Creatinine                  0.68              0.57 - 1.00 *       Sodium                      140               136 - 145 mm*       Potassium                   4.5               3.5 - 5.2 mm*       Chloride                    101               98 - 107 mmo*       CO2                         26.0              22.0 - 29.0 *       Calcium                     10.3              8.6 - 10.5 m*       BUN/Creatinine Ratio        17.6              7.0 - 25.0          Anion Gap                   13.0              5.0 - 15.0 m*       eGFR                        91.0              >60.0 mL/min*  -Protime-INR:   Specimen: Blood       Result                      Value             Ref Range           Protime                     11.1              9.6 - 11.7 S*       INR                         1.00              0.93 - 1.10    -aPTT:   Specimen:  Blood       Result                      Value             Ref Range           PTT                         24.2              24.0 - 31.0 *  -CBC Auto Differential:   Specimen: Blood       Result                      Value             Ref Range           WBC                         8.10              3.40 - 10.80*       RBC                         3.61 (L)          3.77 - 5.28 *       Hemoglobin                  11.6 (L)          12.0 - 15.9 *       Hematocrit                  34.3              34.0 - 46.6 %       MCV                         95.0              79.0 - 97.0 *       MCH                         32.1              26.6 - 33.0 *       MCHC                        33.8              31.5 - 35.7 *       RDW                         13.6              12.3 - 15.4 %       RDW-SD                      45.5              37.0 - 54.0 *       MPV                         7.0               6.0 - 12.0 fL       Platelets                   132 (L)           140 - 450 10*       Neutrophil %                82.6 (H)          42.7 - 76.0 %       Lymphocyte %                6.1 (L)           19.6 - 45.3 %       Monocyte %                  10.5              5.0 - 12.0 %        Eosinophil %                0.2 (L)           0.3 - 6.2 %         Basophil %                  0.6               0.0 - 1.5 %         Neutrophils, Absolute       6.70              1.70 - 7.00 *       Lymphocytes, Absolute       0.50 (L)          0.70 - 3.10 *       Monocytes, Absolute         0.90              0.10 - 0.90 *       Eosinophils, Absolute       0.00              0.00 - 0.40 *       Basophils, Absolute         0.00              0.00 - 0.20 *       nRBC                        0.1               0.0 - 0.2 /1*    Patient with persistent pain, will place in ED observation for pain control and a.m. orthopedic consultation.       Amount and/or Complexity of Data Reviewed  Clinical lab tests: ordered and reviewed  Tests in the radiology section of CPT®:  ordered and reviewed  Tests in the medicine section of CPT®: ordered and reviewed  Independent visualization of images, tracings, or specimens: yes        Final diagnoses:   Closed fracture of right wrist, initial encounter       ED Disposition  ED Disposition     ED Disposition Condition Comment    Decision to Admit            No follow-up provider specified.       Medication List      No changes were made to your prescriptions during this visit.          Baldev Ramirez MD  03/01/22 0435

## 2022-03-01 NOTE — BRIEF OP NOTE
WRIST CLOSED REDUCTION  Progress Note    Jaja Carcamo  3/1/2022    Pre-op Diagnosis:   Closed displaced right distal radius and ulna        Post-Op Diagnosis Codes:   same    Procedure/CPT® Codes:        Procedure(s):  WRIST CLOSED REDUCTION    Surgeon(s):  Gaudencio Townsend MD    Anesthesia: Monitored Anesthesia Care    Staff:   Circulator: Tasneem Buckner RN         Estimated Blood Loss: 0 mL    Urine Voided: 0 mL    Specimens:                None          Drains: * No LDAs found *    Findings: see dictated op report    Complications: none          Gaudencio Townsend MD     Date: 3/1/2022  Time: 18:23 EST

## 2022-03-01 NOTE — H&P
Formerly [facility], [facility] Observation Unit H&P    Patient Name: Jaja Carcamo  : 1945  MRN: [id number]  Primary Care Physician: Gary Bunch MD  Date of admission: 3/1/2022     Patient Care Team:  Gary Bunch MD as PCP - General (Internal Medicine)          Subjective   History Present Illness     Chief Complaint:   Chief Complaint   Patient presents with   • Fall         Ms. Carcamo is a 77 y.o.  presents to [facility] complaining of severe right wrist pain.      77-year-old female presents to the ER with a chief complaint of severe right arm pain after slipping and falling while turning as she was folding laundry.  The patient states she is not sure what happened because it all happened so quickly but she does not recall any dizziness or lightheadedness prior to the fall and is pretty sure she slipped or lost her balance.  She felt well earlier in the day without any complaints of discomfort.  The patient reports that she had difficulty getting up after the fall which would have been difficult even with out a fractured arm.  The patient denies loss of consciousness.  The patient reports that she was out of her diuretic medication for 5 days, but resumed it yesterday.  The patient reports that if she needs surgical repair of her wrist her family is requesting that she go to a hospital in [facility].    Review of records: Patient is status post kidney transplant with immunosuppressive medications to prevent rejection on 17.  The patient had been on dialysis x7 years and has a shunt in her right upper extremity prior to getting the transplant. 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 Mcconnell on 3/8/16.       Review of Systems   Constitutional: Negative for chills and fever.   Musculoskeletal: Positive for falls, joint pain and joint swelling. Negative for neck pain.   All other systems reviewed and are negative.          Personal History     Past Medical History:   Past  Medical History:   Diagnosis Date   • Cancer (HCC)    • Renal disease        Surgical History:      Past Surgical History:   Procedure Laterality Date   • CYSTOSCOPY             Family History: family history includes No Known Problems in her father and mother. Otherwise pertinent FHx was reviewed and unremarkable.     Social History:  reports that she has quit smoking. She has never used smokeless tobacco. She reports that she does not drink alcohol and does not use drugs.      Medications:  Prior to Admission medications    Medication Sig Start Date End Date Taking? Authorizing Provider   azaTHIOprine (IMURAN) 50 MG tablet Take 50 mg by mouth 2 (Two) Times a Day.   Yes Court Vences MD   diazePAM (VALIUM) 5 MG tablet Take 5 mg by mouth 2 (Two) Times a Day As Needed for Anxiety.   Yes Court Vences MD   furosemide (LASIX) 40 MG tablet Take 40 mg by mouth Daily.   Yes Court Vences MD   levothyroxine (SYNTHROID, LEVOTHROID) 50 MCG tablet Take 50 mcg by mouth Daily.   Yes Court Vences MD   metoprolol tartrate (LOPRESSOR) 50 MG tablet Take 50 mg by mouth Daily.   Yes Court Vences MD   potassium chloride (KLOR-CON) 20 MEQ packet Take 20 mEq by mouth Daily.   Yes Court Vences MD   predniSONE (DELTASONE) 5 MG tablet Take 5 mg by mouth Daily.   Yes Court Vences MD   simvastatin (ZOCOR) 20 MG tablet Take 20 mg by mouth Every Night.   Yes Court Vences MD   tacrolimus (PROGRAF) 1 MG capsule Take 1 mg by mouth 2 (Two) Times a Day.   Yes Court Vences MD       Allergies:    Allergies   Allergen Reactions   • Zolpidem Other (See Comments), Unknown (See Comments) and Unknown - High Severity     KEPT HER AWAKE  KEPT HER AWAKE  KEPT HER AWAKE  KEPT HER AWAKE         Objective   Objective     Vital Signs  Temp:  [98.7 °F (37.1 °C)-99 °F (37.2 °C)] 98.7 °F (37.1 °C)  Heart Rate:  [] 77  Resp:  [16-20] 18  BP: (149-170)/(71-95) 170/80  SpO2:  [93 %-98  %] 98 %  on  Flow (L/min):  [3] 3;   Device (Oxygen Therapy): nasal cannula  Body mass index is 29.53 kg/m².    Physical Exam  Vitals and nursing note reviewed.   Constitutional:       Appearance: Normal appearance. She is not ill-appearing.   HENT:      Head: Normocephalic and atraumatic.      Right Ear: External ear normal.      Left Ear: External ear normal.      Nose: Nose normal.      Mouth/Throat:      Mouth: Mucous membranes are moist.   Eyes:      General: No scleral icterus.        Right eye: No discharge.         Left eye: No discharge.      Extraocular Movements: Extraocular movements intact.      Conjunctiva/sclera: Conjunctivae normal.      Pupils: Pupils are equal, round, and reactive to light.   Cardiovascular:      Rate and Rhythm: Normal rate and regular rhythm.      Pulses: Normal pulses.      Heart sounds: Normal heart sounds. No murmur heard.       Arteriovenous access: right arteriovenous access is present.  Pulmonary:      Effort: Pulmonary effort is normal.      Breath sounds: Normal breath sounds.   Abdominal:      General: Bowel sounds are normal.      Palpations: Abdomen is soft.   Musculoskeletal:         General: Tenderness, deformity and signs of injury present. Normal range of motion.      Left wrist: Normal.        Arms:       Cervical back: Normal range of motion and neck supple.      Right lower leg: 3+ Edema present.      Left lower le+ Edema present.      Right foot: Swelling present. No deformity or tenderness.      Left foot: Swelling present. No deformity or tenderness.   Skin:     General: Skin is warm and dry.      Capillary Refill: Capillary refill takes less than 2 seconds.   Neurological:      General: No focal deficit present.      Mental Status: She is alert and oriented to person, place, and time.   Psychiatric:         Mood and Affect: Mood normal.         Behavior: Behavior normal.         Thought Content: Thought content normal.         Judgment: Judgment normal.            Results Review:  I have personally reviewed most recent cardiac tracings, lab results and radiology images and interpretations and agree with findings.    Results from last 7 days   Lab Units 03/01/22  0403   WBC 10*3/mm3 8.10   HEMOGLOBIN g/dL 11.6*   HEMATOCRIT % 34.3   PLATELETS 10*3/mm3 132*   INR  1.00     Results from last 7 days   Lab Units 03/01/22  0403   SODIUM mmol/L 140   POTASSIUM mmol/L 4.5   CHLORIDE mmol/L 101   CO2 mmol/L 26.0   BUN mg/dL 12   CREATININE mg/dL 0.68   GLUCOSE mg/dL 121*   CALCIUM mg/dL 10.3     Estimated Creatinine Clearance: 66 mL/min (by C-G formula based on SCr of 0.68 mg/dL).  Brief Urine Lab Results  (Last result in the past 365 days)      Color   Clarity   Blood   Leuk Est   Nitrite   Protein   CREAT   Urine HCG        02/14/22 1103             283.0               Microbiology Results (last 10 days)     Procedure Component Value - Date/Time    COVID PRE-OP / PRE-PROCEDURE SCREENING ORDER (NO ISOLATION) - Swab, Nasopharynx [[id number]]  (Normal) Collected: 03/01/22 0450    Lab Status: Final result Specimen: Swab from Nasopharynx Updated: 03/01/22 0524    Narrative:      The following orders were created for panel order COVID PRE-OP / PRE-PROCEDURE SCREENING ORDER (NO ISOLATION) - Swab, Nasopharynx.  Procedure                               Abnormality         Status                     ---------                               -----------         ------                     COVID-19,CEPHEID/JACK,CO...[[id number]]  Normal              Final result                 Please view results for these tests on the individual orders.    COVID-19,CEPHEID/JACK,COR/TWILA/PAD/DEVAN IN-HOUSE(OR EMERGENT/ADD-ON),NP SWAB IN TRANSPORT MEDIA 3-4 HR TAT, RT-PCR - Swab, Nasopharynx [[id number]]  (Normal) Collected: 03/01/22 0450    Lab Status: Final result Specimen: Swab from Nasopharynx Updated: 03/01/22 0524     COVID19 Not Detected    Narrative:      Fact sheet for providers:  https://www.fda.gov/media/943967/download     Fact sheet for patients: https://www.fda.gov/media/961393/download  Fact sheet for providers: https://www.fda.gov/media/898517/download    Fact sheet for patients: https://www.fda.gov/media/950739/download    Test performed by PCR.          ECG/EMG Results (most recent)     None          Results for orders placed during the hospital encounter of 10/14/20    Duplex venous lower extremity bilateral CAR    Interpretation Summary  · Normal bilateral lower extremity venous duplex scan.      Results for orders placed during the hospital encounter of 10/14/20    Adult Transthoracic Echo Complete W/ Cont if Necessary Per Protocol    Interpretation Summary  · Left ventricular wall thickness is consistent with concentric hypertrophy.  · Estimated left ventricular EF = 70% Left ventricular systolic function is normal.  · The left atrial cavity is moderately dilated.  · The right atrial cavity is mildly dilated.  · Mild mitral annular calcification is present.  · Mild mitral valve regurgitation is present.  · Left ventricular diastolic function is consistent with (grade I) impaired relaxation.  · Estimated right ventricular systolic pressure from tricuspid regurgitation is mildly elevated (35-45 mmHg).      XR Humerus Right    Result Date: 3/1/2022  1.  Comminuted and impacted dorsally displaced intra-articular distal radial fracture. 2.  Ulnar styloid process fracture. 3.  Severe demineralization. 4.  Osteoarthritis of multiple joints. Electronically signed by:  Zach Zendejas M.D.  3/1/2022 1:18 AM    XR Forearm 2 View Right    Result Date: 3/1/2022  1.  Comminuted and impacted dorsally displaced intra-articular distal radial fracture. 2.  Ulnar styloid process fracture. 3.  Severe demineralization. 4.  Osteoarthritis of multiple joints. Electronically signed by:  Zach Zendejas M.D.  3/1/2022 1:18 AM    XR Wrist 3+ View Right    Result Date: 3/1/2022  1.  Comminuted and  impacted dorsally displaced intra-articular distal radial fracture. 2.  Ulnar styloid process fracture. 3.  Severe demineralization. 4.  Osteoarthritis of multiple joints. Electronically signed by:  Zach Zendejas M.D.  3/1/2022 1:18 AM        Estimated Creatinine Clearance: 66 mL/min (by C-G formula based on SCr of 0.68 mg/dL).    Assessment/Plan   Assessment/Plan       Active Hospital Problems    Diagnosis  POA   • **Right wrist fracture [S62.101A]  Yes     Priority: High      Resolved Hospital Problems   No resolved problems to display.       Right wrist fracture, status post fall: Orthopedic surgery consult; analgesics and antiemetics as needed  -Patient reports that if she needs surgery her family is requesting that she go to a facility in [place]    Antirejection therapy, chronic: Continue amiodarone; continue prednisone; continue Prograf    HLD, chronic: Continue Lipitor    CKD with history of renal transplant 2017: Continue Lasix with potassium    Anxiety, chronic: Continue Valium as needed    Hypertension, chronic: Continue metoprolol; continue Lasix; continue potassium    Hypothyroidism, chronic: Continue levothyroxine    Chronic pain: Continue hydrocodone 7.5 mg as needed      VTE Prophylaxis -   Mechanical Order History:      Ordered        03/01/22 0725  Place Sequential Compression Device  Once            03/01/22 0725  Maintain Sequential Compression Device  Continuous                    Pharmalogical Order History:     None          CODE STATUS:    Code Status and Medical Interventions:   Ordered at: 03/01/22 0725     Code Status (Patient has no pulse and is not breathing):    CPR (Attempt to Resuscitate)     Medical Interventions (Patient has pulse or is breathing):    Full Support       This patient has been examined wearing personal protective equipment.     I discussed the patient's findings and my recommendations with patient.      Signature:Electronically signed by TONYA Snowden,  03/01/22, 8:26 AM EST.

## 2022-03-01 NOTE — ANESTHESIA PREPROCEDURE EVALUATION
Anesthesia Evaluation     Patient summary reviewed and Nursing notes reviewed   NPO Solid Status: > 8 hours  NPO Liquid Status: > 8 hours           Airway   Mallampati: II  TM distance: >3 FB  Neck ROM: full  Dental - normal exam     Pulmonary - negative pulmonary ROS and normal exam   Cardiovascular - normal exam  Exercise tolerance: poor (<4 METS)    ECG reviewed    (+) hypertension, hyperlipidemia,       Neuro/Psych- negative ROS  GI/Hepatic/Renal/Endo    (+) obesity,   renal disease (s/p kidney transplant), thyroid problem hypothyroidism    Musculoskeletal     (+) arthralgias, chronic pain,   Abdominal    Substance History      OB/GYN          Other   chronic steroid use    history of cancer (Hx of lung cancer s/p wedge resection)      Other Comment: Wegener's dz  ROS/Med Hx Other: Right wrist fracture, status post fall: Orthopedic surgery consult; analgesics and antiemetics as needed  -Patient reports that if she needs surgery her family is requesting that she go to a facility in [place]     Antirejection therapy, chronic: Continue amiodarone; continue prednisone; continue Prograf     HLD, chronic: Continue Lipitor     CKD with history of renal transplant 2017: Continue Lasix with potassium       Interpretation Summary    · Left ventricular wall thickness is consistent with concentric hypertrophy.  · Estimated left ventricular EF = 70% Left ventricular systolic function is normal.  · The left atrial cavity is moderately dilated.  · The right atrial cavity is mildly dilated.  · Mild mitral annular calcification is present.  · Mild mitral valve regurgitation is present.  · Left ventricular diastolic function is consistent with (grade I) impaired relaxation.  · Estimated right ventricular systolic pressure from tricuspid regurgitation is mildly elevated (35-45 mmHg).      Anxiety, chronic: Continue Valium as needed     Hypertension, chronic: Continue metoprolol; continue Lasix; continue  potassium     Hypothyroidism, chronic: Continue levothyroxine     Chronic pain: Continue hydrocodone 7.5 mg as needed                  Anesthesia Plan    ASA 3     MAC     intravenous induction     Anesthetic plan, all risks, benefits, and alternatives have been provided, discussed and informed consent has been obtained with: patient.        CODE STATUS:    Code Status (Patient has no pulse and is not breathing): CPR (Attempt to Resuscitate)  Medical Interventions (Patient has pulse or is breathing): Full Support

## 2022-03-01 NOTE — ED NOTES
Patient fell at home and hit R arm.  She has abrasion to R elbow and bruising.  Obvious deformity to R wrist.       Sachi Chavira RN  03/01/22 2607

## 2022-03-01 NOTE — PROGRESS NOTES
Full consult note to follow. In short, right distal radius fracture with distal ulna. Will plan on sedation, reduction, and splinting in OR this afternoon.    NPO for procedure.

## 2022-03-01 NOTE — PLAN OF CARE
Goal Outcome Evaluation:  Plan of Care Reviewed With: patient        Progress: improving  Outcome Summary: Pt went for procedure this shift with Dr Townsend. PT/OT following. Safety maintained, will ctm.

## 2022-03-01 NOTE — SIGNIFICANT NOTE
03/01/22 0921   Rehab Time/Intention   Session Not Performed   (sx pending for today)   Recommendation   PT - Next Appointment 03/02/22

## 2022-03-02 LAB
ANION GAP SERPL CALCULATED.3IONS-SCNC: 7 MMOL/L (ref 5–15)
BASOPHILS # BLD AUTO: 0 10*3/MM3 (ref 0–0.2)
BASOPHILS NFR BLD AUTO: 0.1 % (ref 0–1.5)
BUN SERPL-MCNC: 10 MG/DL (ref 8–23)
BUN/CREAT SERPL: 19.2 (ref 7–25)
CALCIUM SPEC-SCNC: 9.7 MG/DL (ref 8.6–10.5)
CHLORIDE SERPL-SCNC: 103 MMOL/L (ref 98–107)
CO2 SERPL-SCNC: 29 MMOL/L (ref 22–29)
CREAT SERPL-MCNC: 0.52 MG/DL (ref 0.57–1)
DEPRECATED RDW RBC AUTO: 45.1 FL (ref 37–54)
EGFRCR SERPLBLD CKD-EPI 2021: 97 ML/MIN/1.73
EOSINOPHIL # BLD AUTO: 0 10*3/MM3 (ref 0–0.4)
EOSINOPHIL NFR BLD AUTO: 0.1 % (ref 0.3–6.2)
ERYTHROCYTE [DISTWIDTH] IN BLOOD BY AUTOMATED COUNT: 13.6 % (ref 12.3–15.4)
GLUCOSE SERPL-MCNC: 160 MG/DL (ref 65–99)
HCT VFR BLD AUTO: 29.4 % (ref 34–46.6)
HGB BLD-MCNC: 10.2 G/DL (ref 12–15.9)
LYMPHOCYTES # BLD AUTO: 0.4 10*3/MM3 (ref 0.7–3.1)
LYMPHOCYTES NFR BLD AUTO: 5.6 % (ref 19.6–45.3)
MCH RBC QN AUTO: 33.2 PG (ref 26.6–33)
MCHC RBC AUTO-ENTMCNC: 34.7 G/DL (ref 31.5–35.7)
MCV RBC AUTO: 95.5 FL (ref 79–97)
MONOCYTES # BLD AUTO: 1.1 10*3/MM3 (ref 0.1–0.9)
MONOCYTES NFR BLD AUTO: 14.5 % (ref 5–12)
NEUTROPHILS NFR BLD AUTO: 6.1 10*3/MM3 (ref 1.7–7)
NEUTROPHILS NFR BLD AUTO: 79.7 % (ref 42.7–76)
NRBC BLD AUTO-RTO: 0.1 /100 WBC (ref 0–0.2)
PLATELET # BLD AUTO: 106 10*3/MM3 (ref 140–450)
PMV BLD AUTO: 7.3 FL (ref 6–12)
POTASSIUM SERPL-SCNC: 4 MMOL/L (ref 3.5–5.2)
RBC # BLD AUTO: 3.08 10*6/MM3 (ref 3.77–5.28)
SODIUM SERPL-SCNC: 139 MMOL/L (ref 136–145)
WBC NRBC COR # BLD: 7.7 10*3/MM3 (ref 3.4–10.8)

## 2022-03-02 PROCEDURE — A9270 NON-COVERED ITEM OR SERVICE: HCPCS | Performed by: STUDENT IN AN ORGANIZED HEALTH CARE EDUCATION/TRAINING PROGRAM

## 2022-03-02 PROCEDURE — 85025 COMPLETE CBC W/AUTO DIFF WBC: CPT | Performed by: STUDENT IN AN ORGANIZED HEALTH CARE EDUCATION/TRAINING PROGRAM

## 2022-03-02 PROCEDURE — 63710000001 ATORVASTATIN 20 MG TABLET: Performed by: STUDENT IN AN ORGANIZED HEALTH CARE EDUCATION/TRAINING PROGRAM

## 2022-03-02 PROCEDURE — 63710000001 PREDNISONE PER 5 MG: Performed by: STUDENT IN AN ORGANIZED HEALTH CARE EDUCATION/TRAINING PROGRAM

## 2022-03-02 PROCEDURE — 97166 OT EVAL MOD COMPLEX 45 MIN: CPT

## 2022-03-02 PROCEDURE — 63710000001 TACROLIMUS PER 1 MG: Performed by: STUDENT IN AN ORGANIZED HEALTH CARE EDUCATION/TRAINING PROGRAM

## 2022-03-02 PROCEDURE — 63710000001 FUROSEMIDE 40 MG TABLET: Performed by: STUDENT IN AN ORGANIZED HEALTH CARE EDUCATION/TRAINING PROGRAM

## 2022-03-02 PROCEDURE — 63710000001 HYDROCODONE-ACETAMINOPHEN 7.5-325 MG TABLET: Performed by: STUDENT IN AN ORGANIZED HEALTH CARE EDUCATION/TRAINING PROGRAM

## 2022-03-02 PROCEDURE — 97161 PT EVAL LOW COMPLEX 20 MIN: CPT

## 2022-03-02 PROCEDURE — 63710000001 DIAZEPAM 5 MG TABLET: Performed by: STUDENT IN AN ORGANIZED HEALTH CARE EDUCATION/TRAINING PROGRAM

## 2022-03-02 PROCEDURE — 63710000001 LEVOTHYROXINE 50 MCG TABLET: Performed by: STUDENT IN AN ORGANIZED HEALTH CARE EDUCATION/TRAINING PROGRAM

## 2022-03-02 PROCEDURE — 63710000001 AZATHIOPRINE PER 50 MG: Performed by: STUDENT IN AN ORGANIZED HEALTH CARE EDUCATION/TRAINING PROGRAM

## 2022-03-02 PROCEDURE — G0378 HOSPITAL OBSERVATION PER HR: HCPCS

## 2022-03-02 PROCEDURE — 80048 BASIC METABOLIC PNL TOTAL CA: CPT | Performed by: STUDENT IN AN ORGANIZED HEALTH CARE EDUCATION/TRAINING PROGRAM

## 2022-03-02 PROCEDURE — 63710000001 POTASSIUM CHLORIDE 20 MEQ PACK: Performed by: STUDENT IN AN ORGANIZED HEALTH CARE EDUCATION/TRAINING PROGRAM

## 2022-03-02 PROCEDURE — 63710000001 METOPROLOL TARTRATE 50 MG TABLET: Performed by: STUDENT IN AN ORGANIZED HEALTH CARE EDUCATION/TRAINING PROGRAM

## 2022-03-02 RX ORDER — HYDROCODONE BITARTRATE AND ACETAMINOPHEN 7.5; 325 MG/1; MG/1
1 TABLET ORAL EVERY 4 HOURS PRN
Qty: 30 TABLET | Refills: 0 | Status: CANCELLED | OUTPATIENT
Start: 2022-03-02 | End: 2022-03-07

## 2022-03-02 RX ORDER — HYDROCODONE BITARTRATE AND ACETAMINOPHEN 7.5; 325 MG/1; MG/1
1 TABLET ORAL EVERY 4 HOURS PRN
Qty: 10 TABLET | Status: CANCELLED | OUTPATIENT
Start: 2022-03-02 | End: 2022-03-07

## 2022-03-02 RX ADMIN — ATORVASTATIN CALCIUM 20 MG: 20 TABLET, FILM COATED ORAL at 20:00

## 2022-03-02 RX ADMIN — HYDROCODONE BITARTRATE AND ACETAMINOPHEN 1 TABLET: 7.5; 325 TABLET ORAL at 10:06

## 2022-03-02 RX ADMIN — TACROLIMUS 1 MG: 1 CAPSULE ORAL at 08:51

## 2022-03-02 RX ADMIN — Medication 10 ML: at 20:00

## 2022-03-02 RX ADMIN — Medication 10 ML: at 08:51

## 2022-03-02 RX ADMIN — PREDNISONE 5 MG: 5 TABLET ORAL at 08:51

## 2022-03-02 RX ADMIN — FUROSEMIDE 40 MG: 40 TABLET ORAL at 08:51

## 2022-03-02 RX ADMIN — POTASSIUM CHLORIDE 20 MEQ: 1.5 POWDER, FOR SOLUTION ORAL at 08:51

## 2022-03-02 RX ADMIN — HYDROCODONE BITARTRATE AND ACETAMINOPHEN 1 TABLET: 7.5; 325 TABLET ORAL at 05:36

## 2022-03-02 RX ADMIN — AZATHIOPRINE 50 MG: 50 TABLET ORAL at 20:00

## 2022-03-02 RX ADMIN — TACROLIMUS 1 MG: 1 CAPSULE ORAL at 20:00

## 2022-03-02 RX ADMIN — AZATHIOPRINE 50 MG: 50 TABLET ORAL at 08:51

## 2022-03-02 RX ADMIN — LEVOTHYROXINE SODIUM 50 MCG: 0.05 TABLET ORAL at 05:39

## 2022-03-02 RX ADMIN — HYDROCODONE BITARTRATE AND ACETAMINOPHEN 1 TABLET: 7.5; 325 TABLET ORAL at 23:48

## 2022-03-02 RX ADMIN — METOPROLOL TARTRATE 50 MG: 50 TABLET, FILM COATED ORAL at 08:51

## 2022-03-02 RX ADMIN — DIAZEPAM 5 MG: 5 TABLET ORAL at 23:48

## 2022-03-02 RX ADMIN — HYDROCODONE BITARTRATE AND ACETAMINOPHEN 1 TABLET: 7.5; 325 TABLET ORAL at 15:03

## 2022-03-02 NOTE — PROGRESS NOTES
Subjective: The patient did well overnight. She still have some pain in her wrist but it is tolerable. Denies numbness and tingling.    Objective:  NAD, alert awake and oriented x3, nonlabored breathing  Right upper extremity: Splint in place in good repair. Motor intact AIN, PIN, ulnar motor nerve distributions. Brisk capillary refill x5. Sensory intact median, ulnar, radial nerve distributions. No pain with passive stretch of fingers.    Assessment:  77-year-old female status post closed reduction and splinting of right distal radius fracture.    Plan:  Okay to discharge home today from orthopedic standpoint.  Ice elevate right upper extremity to help with pain and swelling.  Keep the splint on at all times.  Work on finger flexion and extensions  Follow-up in the office on 3/7/2022 at [facility] on [facility]. Card was given to the family. Please call the office at [phone] to make the appointment.  I discussed with the family and the patient that a follow-up we will get repeat x-rays and hopefully continue to treat this conservatively but it depends on fracture alignment and healing. They understand.    Gaudencio Townsend MD  [facility]  [phone]

## 2022-03-02 NOTE — OP NOTE
PATIENT NAME: Jaja Carcamo  MRN: [id number]  : 1945 AGE: 77 y.o. GENDER: female  DATE OF OPERATION: 3/1/2022  PREOPERATIVE DIAGNOSIS: Right distal radius and distal ulna fractures, intra-articular, closed, acute.  POSTOPERATIVE DIAGNOSIS: Same  OPERATION PERFORMED: Right distal radius and ulna, intra-articular, closed reduction and splinting.  SURGEON: Gaudencio Townsend MD  Circulator: Tasneem Buckner RN  ANESTHESIA: MAC sedation  ASSISTANT: None   ESTIMATED BLOOD LOSS: 0 cc  SPONGE AND NEEDLE COUNT: Correct  INDICATIONS: The patient had fallen and had a right wrist fracture. She came to [facility] and x-rays were taken and found to have the above-mentioned findings. Orthopedic surgery was consulted for management. Due to the displacement I think she would benefit from close reduction and better positioning of this. Especially with her age and comorbidities this would be good to try to treat conservatively if we get a good reduction and heals in place. All risk benefits and alternatives were explained the patient and they include but are not limited to pain, malunion, nonunion, need for repeat surgery, damage to nerves and blood vessels, compartment syndrome, risk of anesthesia, to name a few.  COMPONENTS:   · None    PERTINENT FINDINGS: Right distal radius and ulna fracture that appeared to be well reduced. The patient did have very thin skin which she already had a tear on her elbow that was known. During the reduction there is small tearing of the dorsal skin over the wrist. She did have significant ecchymosis over the upper arm prior to the procedure.    DETAILS OF PROCEDURE:   The patient was met in the preoperative area. The site was marked. The consent and H&P were reviewed. The patient was then wheeled back to the operative suite and underwent anesthesia. The patient was kept on the bed for the procedure. After anesthesia was performed a closed reduction maneuver was performed on the distal radius.  There was small skin tear superficial on the dorsal aspect of her skin over the wrist. She had had one on elbow prior to this. Both were covered with Xeroform and 4 x 4's. They were not full-thickness.    X-ray was used to confirm the reduction.  The patient was then placed in a well-padded sugar tong plaster splint and reduction was maintained throughout the joint process. A proper mold was performed to help maintain alignment until the plaster dried. Post splinting x-rays were taken and found to have good alignment. At this point the procedure was over. The patient was awoken from anesthesia.     The patient was taken to the recovery room in stable condition. There were no complications and the patient tolerated the procedure well.    Gaudencio Townsend MD  [facility]  [phone]

## 2022-03-02 NOTE — PLAN OF CARE
Goal Outcome Evaluation:  Plan of Care Reviewed With: patient        Progress: improving  Outcome Summary: Managed pain with prn norco. Pt was seen by P/OT this shift--recommending IP rehab. Pt agreed to go to rehab. Up to BSC and in chair x1. Provided partial bath. Noted redness on coccyx area--applied cream and mepelex. Still on periwick when on bed. Noted serosanguneous drainage on elbow--possibly from skin tear based on Dr Townsend note. RUE elevated and applied ice on that area. Noted swelling, bruising and redness on RUE. A&Ox3. Safety maintained, will continue to monitor.

## 2022-03-02 NOTE — THERAPY EVALUATION
Patient Name: Jaja Cacramo  : 1945    MRN: [id number]                              Today's Date: 3/2/2022       Admit Date: 3/1/2022    Visit Dx:     ICD-10-CM ICD-9-CM   1. Closed fracture of right wrist, initial encounter  S62.101A 814.00     Patient Active Problem List   Diagnosis   • Right wrist fracture     Past Medical History:   Diagnosis Date   • Cancer (HCC)     lung   • History of appendectomy    • Renal disease    • Hartman syndrome      Past Surgical History:   Procedure Laterality Date   • BREAST SURGERY Left     cysts rmeoved   • CYSTOSCOPY     • HYSTERECTOMY     • LUNG LOBECTOMY Right    • TRANSPLANTATION RENAL        General Information     Row Name 22 1058          Physical Therapy Time and Intention    Document Type evaluation  -CR     Mode of Treatment physical therapy  -CR     Row Name 22 1058          General Information    Patient Profile Reviewed yes  -CR     Prior Level of Function independent:; all household mobility  -CR     Existing Precautions/Restrictions non-weight bearing  RUE  -CR     Row Name 22 1058          Living Environment    Lives With alone  -CR     Row Name 22 1058          Home Main Entrance    Number of Stairs, Main Entrance none  -CR     Row Name 22 1058          Stairs Within Home, Primary    Number of Stairs, Within Home, Primary none  -CR     Row Name 22 1058          Cognition    Orientation Status (Cognition) oriented x 3  -CR     Row Name 22 1058          Safety Issues, Functional Mobility    Safety Issues Affecting Function (Mobility) insight into deficits/self-awareness; judgment; safety precaution awareness  -CR     Impairments Affecting Function (Mobility) balance; pain; coordination; endurance/activity tolerance; strength  -CR           User Key  (r) = Recorded By, (t) = Taken By, (c) = Cosigned By    Initials Name Provider Type    CR Reyes, Carmela, PT Physical Therapist               Mobility     Row Name  03/02/22 1100          Bed Mobility    Bed Mobility supine-sit  -CR     Supine-Sit Eugene (Bed Mobility) minimum assist (75% patient effort)  -CR     Assistive Device (Bed Mobility) bed rails; draw sheet  -CR     Row Name 03/02/22 1100          Bed-Chair Transfer    Bed-Chair Eugene (Transfers) contact guard  -CR     Row Name 03/02/22 1100          Sit-Stand Transfer    Sit-Stand Eugene (Transfers) minimum assist (75% patient effort); 1 person assist; 2 person assist  -CR     Row Name 03/02/22 1100          Gait/Stairs (Locomotion)    Eugene Level (Gait) minimum assist (75% patient effort); 1 person assist  -CR     Assistive Device (Gait) --  HHA  -CR     Distance in Feet (Gait) 20 ft x 2  -CR     Deviations/Abnormal Patterns (Gait) gait speed decreased; base of support, wide  -CR     Comment (Gait/Stairs) + loss of balance x 2  -CR           User Key  (r) = Recorded By, (t) = Taken By, (c) = Cosigned By    Initials Name Provider Type    CR Reyes, Carmela, PT Physical Therapist               Obj/Interventions     Row Name 03/02/22 1100          Range of Motion Comprehensive    Comment, General Range of Motion AROM BLE wfl  -CR     Row Name 03/02/22 1100          Strength Comprehensive (MMT)    Comment, General Manual Muscle Testing (MMT) Assessment BLE grossly 3/5  -CR     Row Name 03/02/22 1100          Balance    Balance Assessment sitting static balance; sitting dynamic balance; standing static balance; standing dynamic balance  -CR     Static Sitting Balance sitting, edge of bed; WFL  -CR     Dynamic Sitting Balance mild impairment; sitting, edge of bed  -CR     Static Standing Balance moderate impairment; supported  -CR     Dynamic Standing Balance moderate impairment; supported; mild impairment  -CR     Comment, Balance post lean upon immediate standing  -CR     Row Name 03/02/22 1100          Sensory Assessment (Somatosensory)    Sensory Assessment (Somatosensory) LE sensation intact   -CR           User Key  (r) = Recorded By, (t) = Taken By, (c) = Cosigned By    Initials Name Provider Type    CR Reyes, Carmela, PT Physical Therapist               Goals/Plan     Row Name 03/02/22 1106          Bed Mobility Goal 1 (PT)    Activity/Assistive Device (Bed Mobility Goal 1, PT) sit to supine/supine to sit  -CR     Ault Level/Cues Needed (Bed Mobility Goal 1, PT) modified independence  -CR     Time Frame (Bed Mobility Goal 1, PT) 1 week  -CR     Row Name 03/02/22 1106          Transfer Goal 1 (PT)    Activity/Assistive Device (Transfer Goal 1, PT) transfers, all  -CR     Ault Level/Cues Needed (Transfer Goal 1, PT) standby assist  -CR     Time Frame (Transfer Goal 1, PT) 1 week  -CR     Row Name 03/02/22 1106          Gait Training Goal 1 (PT)    Activity/Assistive Device (Gait Training Goal 1, PT) gait (walking locomotion); assistive device use; decrease fall risk; improve balance and speed; increase endurance/gait distance; cane, quad; walker, april  -CR     Ault Level (Gait Training Goal 1, PT) standby assist  -CR     Distance (Gait Training Goal 1, PT) 50 ft  -CR     Time Frame (Gait Training Goal 1, PT) 1 week  -CR     Row Name 03/02/22 1106          Problem Specific Goal 1 (PT)    Problem Specific Goal 1 (PT) demonstrate Good standing balance with/without a.d.  -CR     Time Frame (Problem Specific Goal 1, PT) 2 weeks  -CR           User Key  (r) = Recorded By, (t) = Taken By, (c) = Cosigned By    Initials Name Provider Type    CR Reyes, Carmela, PT Physical Therapist               Clinical Impression     Row Name 03/02/22 1101          Pain    Additional Documentation Pain Scale: FACES Pre/Post-Treatment (Group)  -CR     Row Name 03/02/22 1101          Pain Scale: Numbers Pre/Post-Treatment    Pain Intervention(s) Cold applied; Elevated  -CR     Row Name 03/02/22 1101          Pain Scale: FACES Pre/Post-Treatment    Pain: FACES Scale, Pretreatment 4-->hurts little more  -CR      Posttreatment Pain Rating 4-->hurts little more  -CR     Pain Location - Side Right  -CR     Pain Location - Orientation upper  -CR     Pain Location extremity  -CR     Row Name 03/02/22 1101          Plan of Care Review    Plan of Care Reviewed With patient  -CR     Outcome Summary 76 y/o female who fell at home resulting in R wrist fx on 3/1 , s/p closed reduction and splint application. Pt lives alone and normally does not use a..d for mobility. At time of eval, pt needed min/mod A for supine to sit. Tasha of 1-2 to come to standing with post leaning upon immediate standing. Pt able to ambulate 20 ft x 2 with HHA, steady with inc time up except +loss of balance when turning. Pt is not safe to return home alone at this time. Recommend short IP rehab for mobility and balance training as well as ADL retraining as pt is R hand dominant and now having difficulty with ADL's. Pt voiced not wanting IP rehab.  -CR     Row Name 03/02/22 1101          Therapy Assessment/Plan (PT)    Patient/Family Therapy Goals Statement (PT) home  -CR     Rehab Potential (PT) good, to achieve stated therapy goals  -CR     Criteria for Skilled Interventions Met (PT) yes; skilled treatment is necessary  -CR     Predicted Duration of Therapy Intervention (PT) dc  -CR           User Key  (r) = Recorded By, (t) = Taken By, (c) = Cosigned By    Initials Name Provider Type    CR Reyes, Carmela, PT Physical Therapist               Outcome Measures     Row Name 03/02/22 1107          How much help from another person do you currently need...    Turning from your back to your side while in flat bed without using bedrails? 3  -CR     Moving from lying on back to sitting on the side of a flat bed without bedrails? 2  -CR     Moving to and from a bed to a chair (including a wheelchair)? 3  -CR     Standing up from a chair using your arms (e.g., wheelchair, bedside chair)? 3  -CR     Climbing 3-5 steps with a railing? 2  -CR     To walk in hospital  room? 3  -CR     AM-PAC 6 Clicks Score (PT) 16  -CR     Row Name 03/02/22 1107          Functional Assessment    Outcome Measure Options AM-PAC 6 Clicks Basic Mobility (PT)  -CR           User Key  (r) = Recorded By, (t) = Taken By, (c) = Cosigned By    Initials Name Provider Type    CR Reyes, Carmela, PT Physical Therapist                             Physical Therapy Education                 Title: PT OT SLP Therapies (In Progress)     Topic: Physical Therapy (In Progress)     Point: Mobility training (In Progress)     Learning Progress Summary           Patient Acceptance, E, NR by CR at 3/2/2022 1107                   Point: Home exercise program (Not Started)     Learner Progress:  Not documented in this visit.          Point: Body mechanics (Not Started)     Learner Progress:  Not documented in this visit.          Point: Precautions (In Progress)     Learning Progress Summary           Patient Acceptance, E, NR by CR at 3/2/2022 1107                               User Key     Initials Effective Dates Name Provider Type Discipline    CR 06/16/21 -  Reyes, Carmela, PT Physical Therapist PT              PT Recommendation and Plan  Planned Therapy Interventions (PT): balance training, bed mobility training, gait training, home exercise program, patient/family education, strengthening, transfer training  Plan of Care Reviewed With: patient  Outcome Summary: 78 y/o female who fell at home resulting in R wrist fx on 3/1 , s/p closed reduction and splint application. Pt lives alone and normally does not use a..d for mobility. At time of eval, pt needed min/mod A for supine to sit. Tasha of 1-2 to come to standing with post leaning upon immediate standing. Pt able to ambulate 20 ft x 2 with HHA, steady with inc time up except +loss of balance when turning. Pt is not safe to return home alone at this time. Recommend short IP rehab for mobility and balance training as well as ADL retraining as pt is R hand dominant and  now having difficulty with ADL's. Pt voiced not wanting IP rehab.     Time Calculation:    PT Charges     Row Name 03/02/22 1108             Time Calculation    Start Time 1001  -CR      Stop Time 1024  -CR      Time Calculation (min) 23 min  -CR      PT Received On 03/02/22  -CR      PT - Next Appointment 03/03/22  -CR      PT Goal Re-Cert Due Date 03/16/22  -CR              Time Calculation- PT    Total Timed Code Minutes- PT 0 minute(s)  -CR            User Key  (r) = Recorded By, (t) = Taken By, (c) = Cosigned By    Initials Name Provider Type    CR Reyes, Carmela, PT Physical Therapist              Therapy Charges for Today     Code Description Service Date Service Provider Modifiers Qty    [id number] HC PT EVAL LOW COMPLEXITY 4 3/2/2022 Reyes, Carmela, PT GP 1          PT G-Codes  Outcome Measure Options: AM-PAC 6 Clicks Basic Mobility (PT)  AM-PAC 6 Clicks Score (PT): 16    Carmela Reyes, PT  3/2/2022

## 2022-03-02 NOTE — DISCHARGE PLACEMENT REQUEST
"Gene Carcamo (77 y.o. 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)   Code Status: CPR   Advance Care Planning Activity    Ht: 167.6 cm (66\")   Wt: 83 kg (182 lb 15.7 oz)    Admission Cmt: None   Principal Problem: Right wrist fracture [S62.101A]                 Active Insurance as of 3/1/2022     Primary Coverage     Payor Plan Insurance Group Employer/Plan Group    MEDICARE MEDICARE A & B      Payor Plan Address Payor Plan Phone Number Payor Plan Fax Number Effective Dates    [address] [phone]  1/1/2012 - None Entered    [facility] [id number]       Subscriber Name Subscriber Birth Date Member ID       GENE CARCAMO 1945 [id number]           Secondary Coverage     Payor Plan Insurance Group Employer/Plan Group    [facility] [id number]     Payor Plan Address Payor Plan Phone Number Payor Plan Fax Number Effective Dates    [address]   1/1/2022 - None Entered    [facility] [id number]       Subscriber Name Subscriber Birth Date Member ID       GENE CARCAMO 1945 [id number]                 Emergency Contacts      (Rel.) Home Phone Work Phone Mobile Phone    RENNY CARCAMO (Son) [phone] -- [phone]          "

## 2022-03-02 NOTE — PROGRESS NOTES
[facility] Observation Unit progress note  Patient Name: Jaja Carcamo  : 1945  MRN: [id number]  Primary Care Physician: Provider, No Known  Date of admission: 3/1/2022     Patient Care Team:  Provider, No Known as PCP - General          Subjective   History Present Illness     Chief Complaint:   Chief Complaint   Patient presents with   • Fall     Obtained from H&P on 3/1/2022:  Ms. Carcamo is a 77 y.o.  presents to [facility] complaining of severe right wrist pain.        77-year-old female presents to the ER with a chief complaint of severe right arm pain after slipping and falling while turning as she was folding laundry.  The patient states she is not sure what happened because it all happened so quickly but she does not recall any dizziness or lightheadedness prior to the fall and is pretty sure she slipped or lost her balance.  She felt well earlier in the day without any complaints of discomfort.  The patient reports that she had difficulty getting up after the fall which would have been difficult even with out a fractured arm.  The patient denies loss of consciousness.  The patient reports that she was out of her diuretic medication for 5 days, but resumed it yesterday.  The patient reports that if she needs surgical repair of her wrist her family is requesting that she go to a hospital in [facility].     Review of records: Patient is status post kidney transplant with immunosuppressive medications to prevent rejection on 17.  The patient had been on dialysis x7 years and has a shunt in her right upper extremity prior to getting the transplant. 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 Mcconnell on 3/8/16.      3/2/2022: Patient reports she is done generally well postop with pain moderately controlled with IV pain medication reporting that it is typically better controlled when she takes p.o. medication.  No other significant complaints are reported.      ROS    ROS   Constitutional: Negative for chills and fever.   Musculoskeletal: Positive for falls, joint pain and joint swelling. Negative for neck pain.   All other systems reviewed and are negative.        Personal History     Past Medical History:   Past Medical History:   Diagnosis Date   • Cancer (HCC)     lung   • History of appendectomy    • Renal disease    • Hartman syndrome        Surgical History:      Past Surgical History:   Procedure Laterality Date   • BREAST SURGERY Left     cysts rmeoved   • CYSTOSCOPY     • HYSTERECTOMY     • LUNG LOBECTOMY Right    • TRANSPLANTATION RENAL             Family History: family history includes No Known Problems in her father and mother. Otherwise pertinent FHx was reviewed and unremarkable.     Social History:  reports that she has quit smoking. She has never used smokeless tobacco. She reports that she does not drink alcohol and does not use drugs.      Medications:  Prior to Admission medications    Medication Sig Start Date End Date Taking? Authorizing Provider   azaTHIOprine (IMURAN) 50 MG tablet Take 50 mg by mouth 2 (Two) Times a Day.   Yes Court Vences MD   diazePAM (VALIUM) 5 MG tablet Take 5 mg by mouth 2 (Two) Times a Day As Needed for Anxiety.   Yes Court Vences MD   furosemide (LASIX) 40 MG tablet Take 40 mg by mouth Daily.   Yes Court Vences MD   levothyroxine (SYNTHROID, LEVOTHROID) 50 MCG tablet Take 50 mcg by mouth Daily.   Yes Court Vences MD   metoprolol tartrate (LOPRESSOR) 50 MG tablet Take 50 mg by mouth Daily.   Yes Court Vences MD   potassium chloride (KLOR-CON) 20 MEQ packet Take 20 mEq by mouth Daily.   Yes Court Vences MD   predniSONE (DELTASONE) 5 MG tablet Take 5 mg by mouth Daily.   Yes Court Vences MD   simvastatin (ZOCOR) 20 MG tablet Take 20 mg by mouth Every Night.   Yes Court Vences MD   tacrolimus (PROGRAF) 1 MG capsule Take 1 mg by mouth 2 (Two) Times a Day.   Yes  Provider, Historical, MD       Allergies:    Allergies   Allergen Reactions   • Zolpidem Other (See Comments), Unknown (See Comments) and Unknown - High Severity     KEPT HER AWAKE  KEPT HER AWAKE  KEPT HER AWAKE  KEPT HER AWAKE         Objective   Objective     Vital Signs  Temp:  [97.6 °F (36.4 °C)-98.9 °F (37.2 °C)] 98.6 °F (37 °C)  Heart Rate:  [60-82] 61  Resp:  [18-24] 18  BP: (130-162)/(44-77) 162/76  SpO2:  [93 %-100 %] 97 %  on  Flow (L/min):  [2-4] 2;   Device (Oxygen Therapy): nasal cannula  Body mass index is 29.53 kg/m².    Physical Exam  Vitals reviewed.   Constitutional:       General: She is not in acute distress.     Appearance: Normal appearance. She is normal weight. She is not ill-appearing, toxic-appearing or diaphoretic.   HENT:      Head: Normocephalic and atraumatic.      Right Ear: External ear normal.      Left Ear: External ear normal.      Nose: Nose normal.      Mouth/Throat:      Mouth: Mucous membranes are moist.   Eyes:      Extraocular Movements: Extraocular movements intact.   Cardiovascular:      Rate and Rhythm: Normal rate and regular rhythm.      Pulses: Normal pulses.      Heart sounds: Normal heart sounds.   Pulmonary:      Effort: Pulmonary effort is normal.      Breath sounds: Rhonchi present.   Abdominal:      General: Bowel sounds are normal. There is no distension.      Palpations: Abdomen is soft.      Tenderness: There is no abdominal tenderness.   Musculoskeletal:         General: Signs of injury present. Normal range of motion.      Cervical back: Normal range of motion.      Right lower leg: No edema.      Left lower leg: No edema.   Skin:     General: Skin is warm and dry.      Capillary Refill: Capillary refill takes less than 2 seconds.   Neurological:      General: No focal deficit present.      Mental Status: She is alert and oriented to person, place, and time.      Comments: Right upper extremity splinted with normal distal neurovascular function noted in  fingers   Psychiatric:         Mood and Affect: Mood normal.         Behavior: Behavior normal.         Thought Content: Thought content normal.         Judgment: Judgment normal.     Results Review:  I have personally reviewed most recent lab results and radiology images and interpretations and agree with findings, most notably: BMP, CBC, x-ray of right wrist, CT of right upper extremity.    Results from last 7 days   Lab Units 03/02/22  0705 03/01/22  0403 03/01/22  0403   WBC 10*3/mm3 7.70   < > 8.10   HEMOGLOBIN g/dL 10.2*   < > 11.6*   HEMATOCRIT % 29.4*   < > 34.3   PLATELETS 10*3/mm3 106*   < > 132*   INR   --   --  1.00    < > = values in this interval not displayed.     Results from last 7 days   Lab Units 03/02/22  0705   SODIUM mmol/L 139   POTASSIUM mmol/L 4.0   CHLORIDE mmol/L 103   CO2 mmol/L 29.0   BUN mg/dL 10   CREATININE mg/dL 0.52*   GLUCOSE mg/dL 160*   CALCIUM mg/dL 9.7     Estimated Creatinine Clearance: 66 mL/min (A) (by C-G formula based on SCr of 0.52 mg/dL (L)).  Brief Urine Lab Results  (Last result in the past 365 days)      Color   Clarity   Blood   Leuk Est   Nitrite   Protein   CREAT   Urine HCG        02/14/22 1103             283.0               Microbiology Results (last 10 days)     Procedure Component Value - Date/Time    COVID PRE-OP / PRE-PROCEDURE SCREENING ORDER (NO ISOLATION) - Swab, Nasopharynx [[id number]]  (Normal) Collected: 03/01/22 0450    Lab Status: Final result Specimen: Swab from Nasopharynx Updated: 03/01/22 0524    Narrative:      The following orders were created for panel order COVID PRE-OP / PRE-PROCEDURE SCREENING ORDER (NO ISOLATION) - Swab, Nasopharynx.  Procedure                               Abnormality         Status                     ---------                               -----------         ------                     COVID-19,CEPHEID/JACK,CO...[[id number]]  Normal              Final result                 Please view results for these tests on the  individual orders.    COVID-19,CEPHEID/JACK,COR/TWILA/PAD/DEVAN IN-HOUSE(OR EMERGENT/ADD-ON),NP SWAB IN TRANSPORT MEDIA 3-4 HR TAT, RT-PCR - Swab, Nasopharynx [[id number]]  (Normal) Collected: 03/01/22 0450    Lab Status: Final result Specimen: Swab from Nasopharynx Updated: 03/01/22 0524     COVID19 Not Detected    Narrative:      Fact sheet for providers: https://www.fda.gov/media/540180/download     Fact sheet for patients: https://www.fda.gov/media/588216/download  Fact sheet for providers: https://www.fda.gov/media/848457/download    Fact sheet for patients: https://www.fda.gov/media/217982/download    Test performed by PCR.          ECG/EMG Results (most recent)     Procedure Component Value Units Date/Time    ECG 12 Lead [[id number]] Collected: 03/01/22 1157     Updated: 03/01/22 1159     QT Interval 441 ms     Narrative:      HEART RATE= 58  bpm  RR Interval= 1036  ms  CO Interval= 152  ms  P Horizontal Axis= -21  deg  P Front Axis= 63  deg  QRSD Interval= 80  ms  QT Interval= 441  ms  QRS Axis= -18  deg  T Wave Axis= 21  deg  - ABNORMAL ECG -  Sinus bradycardia  Atrial premature complex  Left ventricular hypertrophy  Nonspecific T abnormalities, anterior leads  When compared with ECG of 29-Dec-2015 11:41:06,  Significant repolarization change  Significant axis, voltage or hypertrophy change  Electronically Signed By:   Date and Time of Study: 2022-03-01 11:57:47          Results for orders placed during the hospital encounter of 10/14/20    Duplex venous lower extremity bilateral CAR    Interpretation Summary  · Normal bilateral lower extremity venous duplex scan.      Results for orders placed during the hospital encounter of 10/14/20    Adult Transthoracic Echo Complete W/ Cont if Necessary Per Protocol    Interpretation Summary  · Left ventricular wall thickness is consistent with concentric hypertrophy.  · Estimated left ventricular EF = 70% Left ventricular systolic function is normal.  · The left atrial  cavity is moderately dilated.  · The right atrial cavity is mildly dilated.  · Mild mitral annular calcification is present.  · Mild mitral valve regurgitation is present.  · Left ventricular diastolic function is consistent with (grade I) impaired relaxation.  · Estimated right ventricular systolic pressure from tricuspid regurgitation is mildly elevated (35-45 mmHg).      XR Humerus Right    Result Date: 3/1/2022  1.  Comminuted and impacted dorsally displaced intra-articular distal radial fracture. 2.  Ulnar styloid process fracture. 3.  Severe demineralization. 4.  Osteoarthritis of multiple joints. Electronically signed by:  Zach Zendejas M.D.  3/1/2022 1:18 AM    XR Forearm 2 View Right    Result Date: 3/1/2022  1.  Comminuted and impacted dorsally displaced intra-articular distal radial fracture. 2.  Ulnar styloid process fracture. 3.  Severe demineralization. 4.  Osteoarthritis of multiple joints. Electronically signed by:  Zach Zendejas M.D.  3/1/2022 1:18 AM    XR Wrist 3+ View Right    Result Date: 3/1/2022  1.  Comminuted and impacted dorsally displaced intra-articular distal radial fracture. 2.  Ulnar styloid process fracture. 3.  Severe demineralization. 4.  Osteoarthritis of multiple joints. Electronically signed by:  Zach Zendejas M.D.  3/1/2022 1:18 AM    CT Upper Extremity Right Without Contrast    Result Date: 3/1/2022  1.  Reduction and improved alignment of comminuted intra-articular distal radial fracture. 2.  Improved alignment of ulnar styloid fracture. 3.  Question irregularity of the dorsum of the lunate, correlate for symptoms. 4.  Chondrocalcinosis Electronically signed by:  [place] M.D.  3/1/2022 9:37 PM        Estimated Creatinine Clearance: 66 mL/min (A) (by C-G formula based on SCr of 0.52 mg/dL (L)).    Assessment/Plan   Assessment/Plan       Active Hospital Problems    Diagnosis  POA   • **Right wrist fracture [S62.101A]  Yes      Resolved Hospital Problems    No resolved problems to display.     Right wrist fracture, status post fall: Orthopedic surgery consult; analgesics and antiemetics as needed  -Patient reports that if she needs surgery her family is requesting that she go to a facility in [place]              -Right radial ulnar intra-articular closed reduction and splinting performed successfully on 3/1/2022              -Continue Norco at discharge              -Follow-up with orthopedic surgery on 3/7/2022 as instructed  -Patient initially planned for discharge on 3/2/2022 however in the afternoon she reconsidered inpatient rehab and has decided she would like to pursue that option at this time, case management has been consulted to determine if patient will need pre-CERT and to assist in placement       Antirejection therapy, chronic: Continue amiodarone; continue prednisone; continue Prograf     HLD, chronic: Continue Lipitor     CKD with history of renal transplant 2017: Continue Lasix with potassium.  Serum creatinine: 0.52 with a BUN of 10     Anxiety, chronic: Continue Valium as needed     Hypertension, chronic: Continue metoprolol; continue Lasix; continue potassium     Hypothyroidism, chronic: Continue levothyroxine     Chronic pain: Continue hydrocodone 7.5 mg as needed            VTE Prophylaxis -   Mechanical Order History:      Ordered        03/01/22 0725  Place Sequential Compression Device  Once            03/01/22 0725  Maintain Sequential Compression Device  Continuous                    Pharmalogical Order History:     None          CODE STATUS:    Code Status and Medical Interventions:   Ordered at: 03/01/22 0725     Code Status (Patient has no pulse and is not breathing):    CPR (Attempt to Resuscitate)     Medical Interventions (Patient has pulse or is breathing):    Full Support       This patient has been examined wearing personal protective equipment.     I discussed the patient's findings and my recommendations with patient and  nursing staff.      Signature:Electronically signed by Zach Cage PA-C, 03/02/22, 3:37 PM EST.

## 2022-03-02 NOTE — CASE MANAGEMENT/SOCIAL WORK
Discharge Planning Assessment   [facility] [place]     Patient Name: Jaja Carcamo  MRN: [id number]  Today's Date: 3/2/2022    Admit Date: 3/1/2022     Discharge Needs Assessment     Row Name 03/02/22 1616       Living Environment    Lives With alone    Current Living Arrangements home/apartment/condo    Primary Care Provided by self    Provides Primary Care For no one    Family Caregiver if Needed child(katarzyna), adult    Quality of Family Relationships supportive    Able to Return to Prior Arrangements yes       Resource/Environmental Concerns    Resource/Environmental Concerns none    Transportation Concerns car, none       Transition Planning    Patient/Family Anticipates Transition to inpatient rehabilitation facility    Transportation Anticipated family or friend will provide       Discharge Needs Assessment    Readmission Within the Last 30 Days no previous admission in last 30 days    Equipment Currently Used at Home walker, rolling    Concerns to be Addressed discharge planning    Anticipated Changes Related to Illness none    Equipment Needed After Discharge none    Outpatient/Agency/Support Group Needs inpatient rehabilitation facility    Discharge Facility/Level of Care Needs rehabilitation facility    Provided Post Acute Provider List? Yes    Post Acute Provider List Inpatient Rehab    Provided Post Acute Provider Quality & Resource List? Yes    Post Acute Provider Quality and Resource List Inpatient Rehab    Delivered To Patient; Support Person    Method of Delivery In person    Current Discharge Risk lives alone               Discharge Plan     Row Name 03/02/22 1617       Plan    Plan Referral to [facility], pending acceptance. No precert or PASRR required for [facility].  2nd choice [facility]. Pt has home O2 through [employer].  Platform walker through [employer], pending delivery and order.    Plan Comments Met with pt in room.  Pt lives alone, is I with ADLs prior to admission.  PCP - Dr. Bunch in [facility].  Pt agreeable to  IP rehab, with referral to [facility] as [facility] choice.  [facility] second choice.  Pt son will provide transportation.[facility] [place] notified of need of platform walker. [facility] notified of need of order.              Continued Care and Services - Admitted Since 3/1/2022     Destination     Service Provider Request Status Selected Services Address Phone Fax Patient Preferred    [facility]  Pending - Request Sent N/A [id number] [facility] [id number] [phone] --              Expected Discharge Date and Time     Expected Discharge Date Expected Discharge Time    Mar 4, 2022          Demographic Summary     Row Name 03/02/22 1616       General Information    Admission Type observation    Arrived From emergency department    Referral Source admission list    Reason for Consult discharge planning    Preferred Language English     Used During This Interaction no               Functional Status     Row Name 03/02/22 1616       Functional Status    Usual Activity Tolerance good    Current Activity Tolerance moderate       Functional Status, IADL    Medications independent    Meal Preparation independent    Housekeeping independent    Laundry independent    Shopping independent       Mental Status    General Appearance WDL WDL       Mental Status Summary    Recent Changes in Mental Status/Cognitive Functioning no changes                      Rachel Robbins, RN

## 2022-03-02 NOTE — DISCHARGE PLACEMENT REQUEST
"Gene Carcamo (77 y.o. Female)             Date of Birth Social Security Number Address Home Phone MRN    1945  [id number] [place] [address] [phone] [id number]    [employer] Marital Status             None        Admission Date Admission Type Admitting Provider Attending Provider Department, Room/Bed    3/1/22 Emergency Baldev Ramirez MD Riddle, John J, MD [facility] OBSERVATION, 107/1    Discharge Date Discharge Disposition Discharge Destination                         Attending Provider: Baldev Ramirez MD    Allergies: Zolpidem    Isolation: Contact   Infection: ESBL E coli (11/08/20)   Code Status: CPR   Advance Care Planning Activity    Ht: 167.6 cm (66\")   Wt: 83 kg (182 lb 15.7 oz)    Admission Cmt: None   Principal Problem: Right wrist fracture [S62.101A]                 Active Insurance as of 3/1/2022     Primary Coverage     Payor Plan Insurance Group Employer/Plan Group    MEDICARE MEDICARE A & B      Payor Plan Address Payor Plan Phone Number Payor Plan Fax Number Effective Dates    [address] [phone]  1/1/2012 - None Entered    [facility] [id number]       Subscriber Name Subscriber Birth Date Member ID       GENE CARCAMO 1945 [id number]           Secondary Coverage     Payor Plan Insurance Group Employer/Plan Group    [facility] [id number]     Payor Plan Address Payor Plan Phone Number Payor Plan Fax Number Effective Dates    [address]   1/1/2022 - None Entered    [facility] [id number]       Subscriber Name Subscriber Birth Date Member ID       GENE CARCAMO 1945 [id number]                 Emergency Contacts      (Rel.) Home Phone Work Phone Mobile Phone    RENNY CARCAMO (Son) [phone] -- [phone]               "

## 2022-03-02 NOTE — THERAPY EVALUATION
Patient Name: Jaja Carcamo  : 1945    MRN: [id number]                              Today's Date: 3/2/2022       Admit Date: 3/1/2022    Visit Dx:     ICD-10-CM ICD-9-CM   1. Closed fracture of right wrist, initial encounter  S62.101A 814.00     Patient Active Problem List   Diagnosis   • Right wrist fracture     Past Medical History:   Diagnosis Date   • Cancer (HCC)     lung   • History of appendectomy    • Renal disease    • Hartman syndrome      Past Surgical History:   Procedure Laterality Date   • BREAST SURGERY Left     cysts rmeoved   • CYSTOSCOPY     • HYSTERECTOMY     • LUNG LOBECTOMY Right    • TRANSPLANTATION RENAL        General Information     Row Name 22 1119          OT Time and Intention    Document Type evaluation  -NA     Mode of Treatment occupational therapy  -NA     Row Name 22 1315          General Information    Patient Profile Reviewed yes  -NA     Existing Precautions/Restrictions non-weight bearing; fall; oxygen therapy device and L/min  R UE  -NA     Row Name 22 1315          Living Environment    Lives With alone  does not have family to check on her  -NA     Row Name 22 1315          Home Main Entrance    Number of Stairs, Main Entrance none  -NA     Row Name 22 1315          Stairs Within Home, Primary    Number of Stairs, Within Home, Primary none  -NA     Row Name 22 1315 22 1119       Cognition    Orientation Status (Cognition) oriented x 3  -NA oriented x 3  -NA    Row Name 22 1315 22 1119       Safety Issues, Functional Mobility    Impairments Affecting Function (Mobility) balance; pain; coordination; endurance/activity tolerance; strength  -NA balance; pain; coordination; endurance/activity tolerance; strength  -NA          User Key  (r) = Recorded By, (t) = Taken By, (c) = Cosigned By    Initials Name Provider Type    NA Jennifer Doyle OT Occupational Therapist                 Mobility/ADL's     Row Name 22  1316          Bed Mobility    Bed Mobility supine-sit  -NA     Supine-Sit [place] (Bed Mobility) minimum assist (75% patient effort); verbal cues; nonverbal cues (demo/gesture)  -NA     Bed Mobility, Safety Issues decreased use of arms for pushing/pulling; decreased use of legs for bridging/pushing; impaired trunk control for bed mobility  -NA     Assistive Device (Bed Mobility) bed rails; draw sheet  -NA     Comment (Bed Mobility) increased time and effort with agigation with difficulty moving  -NA     Row Name 03/02/22 1316          Transfers    Transfers sit-stand transfer; bed-chair transfer  -NA     Bed-Chair [place] (Transfers) minimum assist (75% patient effort); verbal cues; nonverbal cues (demo/gesture); contact guard; 1 person to manage equipment; 1 person assist  -NA     Assistive Device (Bed-Chair Transfers) other (see comments)  HHA x2  -NA     Sit-Stand [place] (Transfers) minimum assist (75% patient effort); 1 person assist; 2 person assist  -NA     Row Name 03/02/22 1316          Sit-Stand Transfer    Assistive Device (Sit-Stand Transfers) other (see comments)  HHAx2  -NA     Row Name 03/02/22 1316          Functional Mobility    Functional Mobility- Ind. Level minimum assist (75% patient effort); verbal cues required; nonverbal cues required (demo/gesture); 1 person + 1 person to manage equipment  -NA     Functional Mobility- Device other (see comments)  HHAx2  -NA     Functional Mobility- Safety Issues supplemental O2; balance decreased during turns; step length decreased  -NA     Row Name 03/02/22 1316          Activities of Daily Living    BADL Assessment/Intervention lower body dressing; toileting; upper body dressing  -NA     Row Name 03/02/22 1316          Lower Body Dressing Assessment/Training    [place] Level (Lower Body Dressing) doff; don; shoes/slippers; moderate assist (50% patient effort)  -NA     Position (Lower Body Dressing) edge of bed sitting  -NA     Row Name  03/02/22 1316          Toileting Assessment/Training    Model Level (Toileting) moderate assist (50% patient effort); change pad/brief; adjust/manage clothing  -NA     Position (Toileting) supported standing; unsupported sitting  -NA     Row Name 03/02/22 1316          Upper Body Dressing Assessment/Training    Model Level (Upper Body Dressing) doff; don; front opening garment; minimum assist (75% patient effort)  -NA     Position (Upper Body Dressing) edge of bed sitting  -NA           User Key  (r) = Recorded By, (t) = Taken By, (c) = Cosigned By    Initials Name Provider Type    NA Jennifer Doyle OT Occupational Therapist               Obj/Interventions     Row Name 03/02/22 1319          Sensory Assessment (Somatosensory)    Sensory Assessment (Somatosensory) sensation intact  -     Row Name 03/02/22 1319          Vision Assessment/Intervention    Visual Impairment/Limitations corrective lenses full-time  -NA     Row Name 03/02/22 1319          Range of Motion Comprehensive    General Range of Motion upper extremity range of motion deficits identified; hand range of motion deficits identified  -     Row Name 03/02/22 1319          Strength Comprehensive (MMT)    General Manual Muscle Testing (MMT) Assessment upper extremity strength deficits identified; hand strength deficits identified  -     Row Name 03/02/22 1319          Hand (Therapeutic Exercise)    Hand (Therapeutic Exercise) AROM (active range of motion); PROM (passive range of motion)  -NA     Hand AROM/AAROM (Therapeutic Exercise) right; AAROM (active assistive range of motion); finger flexion; finger extension; finger aBduction; finger aDduction; thumb opposition; thumb flexion; thumb extension; thumb palmar aBduction; 10 repetitions; 3 second hold  pt educated on active assist with AAROM of R UE fingers  -NA     Row Name 03/02/22 1319          Motor Skills    Motor Skills coordination; functional endurance  -NA      Coordination fine motor deficit; right; upper extremity; moderate impairment; bimanual skills  -NA     Row Name 03/02/22 1319          Balance    Static Sitting Balance WFL; unsupported; sitting, edge of bed  -NA     Dynamic Sitting Balance mild impairment; unsupported; sitting, edge of bed  -NA     Static Standing Balance moderate impairment; unsupported; standing  -NA     Dynamic Standing Balance moderate impairment; unsupported; standing  -NA     Row Name 03/02/22 1319          Therapeutic Exercise    Therapeutic Exercise hand  R UE fingers and thumb ROM orders. pt noted to have swelling and brusing on R hand. in elbow to wrist cast  -NA           User Key  (r) = Recorded By, (t) = Taken By, (c) = Cosigned By    Initials Name Provider Type    NA Jennifer Doyle OT Occupational Therapist               Goals/Plan     Row Name 03/02/22 1322          Transfer Goal 1 (OT)    Activity/Assistive Device (Transfer Goal 1, OT) toilet; walker, platform  -NA     [place] Level/Cues Needed (Transfer Goal 1, OT) modified independence  -NA     Time Frame (Transfer Goal 1, OT) 2 weeks  -NA     Row Name 03/02/22 1322          Toileting Goal 1 (OT)    Activity/Device (Toileting Goal 1, OT) toileting skills, all; grab bar/safety frame  plat form walker  -NA     [place] Level/Cues Needed (Toileting Goal 1, OT) modified independence  -NA     Time Frame (Toileting Goal 1, OT) 2 weeks  -NA     Row Name 03/02/22 1322          Grooming Goal 1 (OT)    Activity/Device (Grooming Goal 1, OT) hair care; oral care; wash face, hands  standing at sink for at least 3 grooming tasks with platform walker  -NA     [place] (Grooming Goal 1, OT) modified independence  -NA     Time Frame (Grooming Goal 1, OT) 2 weeks  -NA     Row Name 03/02/22 1322          ROM Goal 1 (OT)    ROM Goal 1 (OT) full finger ROM to increase participation with ADLs  -NA     Time Frame (ROM Goal 1, OT) 2 weeks  -NA     Row Name 03/02/22 1322          Therapy  Assessment/Plan (OT)    Planned Therapy Interventions (OT) activity tolerance training; patient/caregiver education/training; neuromuscular control/coordination retraining; BADL retraining; ROM/therapeutic exercise; occupation/activity based interventions; strengthening exercise; transfer/mobility retraining; functional balance retraining; IADL retraining; passive ROM/stretching  -NA           User Key  (r) = Recorded By, (t) = Taken By, (c) = Cosigned By    Initials Name Provider Type    NA Jennifer Doyle, OT Occupational Therapist               Clinical Impression     Row Name 03/02/22 1321          Pain Assessment    Additional Documentation Pain Scale: FACES Pre/Post-Treatment (Group)  -NA     Row Name 03/02/22 1321          Pain Scale: FACES Pre/Post-Treatment    Pain: FACES Scale, Pretreatment 4-->hurts little more  -NA     Posttreatment Pain Rating 6-->hurts even more  -NA     Pre/Posttreatment Pain Comment R HAND  -NA     Row Name 03/02/22 1321          Plan of Care Review    Plan of Care Reviewed With patient; other (see comments)  PT and RN  -NA     Row Name 03/02/22 1321          Therapy Assessment/Plan (OT)    Rehab Potential (OT) good, to achieve stated therapy goals  -NA     Criteria for Skilled Therapeutic Interventions Met (OT) yes; skilled treatment is necessary; meets criteria  -NA     Therapy Frequency (OT) 5 times/wk  -NA     Row Name 03/02/22 1321          Vital Signs    Pre SpO2 (%) 93  -NA     O2 Delivery Pre Treatment nasal cannula  2-3L  -NA     O2 Delivery Intra Treatment nasal cannula  -NA     Post SpO2 (%) 95  -NA     O2 Delivery Post Treatment nasal cannula  -NA     Row Name 03/02/22 1321          Positioning and Restraints    Pre-Treatment Position in bed  -NA     Post Treatment Position chair  -NA     In Chair notified nsg; reclined; call light within reach; encouraged to call for assist; exit alarm on  -NA           User Key  (r) = Recorded By, (t) = Taken By, (c) = Cosigned By     Initials Name Provider Type    NA Jennifer Doyle OT Occupational Therapist               Outcome Measures     Row Name 03/02/22 1324          How much help from another is currently needed...    Putting on and taking off regular lower body clothing? 2  -NA     Bathing (including washing, rinsing, and drying) 2  -NA     Toileting (which includes using toilet bed pan or urinal) 2  -NA     Putting on and taking off regular upper body clothing 2  -NA     Taking care of personal grooming (such as brushing teeth) 3  -NA     Eating meals 3  -NA     AM-PAC 6 Clicks Score (OT) 14  -NA     Row Name 03/02/22 1324 03/02/22 1107       How much help from another person do you currently need...    Turning from your back to your side while in flat bed without using bedrails? 3  -NA 3  -CR    Moving from lying on back to sitting on the side of a flat bed without bedrails? 3  -NA 2  -CR    Moving to and from a bed to a chair (including a wheelchair)? 2  -NA 3  -CR    Standing up from a chair using your arms (e.g., wheelchair, bedside chair)? 3  -NA 3  -CR    Climbing 3-5 steps with a railing? 1  -NA 2  -CR    To walk in hospital room? 3  -NA 3  -CR    AM-PAC 6 Clicks Score (PT) 15  -NA 16  -CR    Row Name 03/02/22 1324          Modified Flores Scale    Pre-Stroke Modified Heard Scale 6 - Unable to determine (UTD) from the medical record documentation  -NA     Modified Heard Scale 4 - Moderately severe disability.  Unable to walk without assistance, and unable to attend to own bodily needs without assistance.  -NA     Row Name 03/02/22 1324 03/02/22 1107       Functional Assessment    Outcome Measure Options AM-PAC 6 Clicks Basic Mobility (PT); AM-PAC 6 Clicks Daily Activity (OT); Modified Flores  -NA AM-PAC 6 Clicks Basic Mobility (PT)  -CR          User Key  (r) = Recorded By, (t) = Taken By, (c) = Cosigned By    Initials Name Provider Type    Jennifer Bonds, OT Occupational Therapist    CR Reyes, Carmela, PT Physical  Therapist                Occupational Therapy Education                 Title: PT OT SLP Therapies (In Progress)     Topic: Occupational Therapy (Done)     Point: ADL training (Done)     Description:   Instruct learner(s) on proper safety adaptation and remediation techniques during self care or transfers.   Instruct in proper use of assistive devices.              Learning Progress Summary           Patient Acceptance, E,TB,D,H, VU,DU,NR by NA at 3/2/2022 1325    Comment: using call light, AROM/AROM of R fingers, transfer training, DC rec                   Point: Home exercise program (Done)     Description:   Instruct learner(s) on appropriate technique for monitoring, assisting and/or progressing therapeutic exercises/activities.              Learning Progress Summary           Patient Acceptance, E,TB,D,H, VU,DU,NR by NA at 3/2/2022 1325    Comment: using call light, AROM/AROM of R fingers, transfer training, DC rec                   Point: Precautions (Done)     Description:   Instruct learner(s) on prescribed precautions during self-care and functional transfers.              Learning Progress Summary           Patient Acceptance, E,TB,D,H, VU,DU,NR by NA at 3/2/2022 1325    Comment: using call light, AROM/AROM of R fingers, transfer training, DC rec                   Point: Body mechanics (Done)     Description:   Instruct learner(s) on proper positioning and spine alignment during self-care, functional mobility activities and/or exercises.              Learning Progress Summary           Patient Acceptance, E,TB,D,H, VU,DU,NR by NA at 3/2/2022 1325    Comment: using call light, AROM/AROM of R fingers, transfer training, DC rec                               User Key     Initials Effective Dates Name Provider Type Discipline    SILVANO 09/30/20 -  Jennifer Doyle OT Occupational Therapist OT              OT Recommendation and Plan  Planned Therapy Interventions (OT): activity tolerance training,  patient/caregiver education/training, neuromuscular control/coordination retraining, BADL retraining, ROM/therapeutic exercise, occupation/activity based interventions, strengthening exercise, transfer/mobility retraining, functional balance retraining, IADL retraining, passive ROM/stretching  Therapy Frequency (OT): 5 times/wk  Plan of Care Review  Plan of Care Reviewed With: patient, other (see comments) (PT and RN)     Pt is a 76 y/o F admitted for fall sustaining closed displaced right distal radius and ulna. Pt with R wrist closed reduction and placed in splint and to wear at all times. OT order for R finger flex/ext in order to increase ROM and decreased stiffness in R hand. Pt noted to have swelling and bruising in R hand. Prior to admission, pt lives alone, does not use AD, 2 falls, independent with ADLs and func mob, drives, grocery shops. This date, pt is a&Ox4, agitated with being in the hospital and falling. Pt req min a to stand with HHA and support for R UE in cast, pt req min ax2 with HHA x2 for func mob and one LOB turning to sit in chair. Pt req mod A for donning brief and LB dressing. Demo deficits with strength, balance, endurance, safety, and impaired R hand ROM. OT to address deficits. REC IP rehab as pt not safe for home and does not have family/friends to assist or stay with her. Pt does not want rehab.   If pt declines rehab, will need 24/7 hour care,  OT    Discussed with RN about pt using R platform walker and if pt able to have sling when up, RN to message MD.     Time Calculation:    Time Calculation- OT     Row Name 03/02/22 1325             Time Calculation- OT    OT Start Time 1001  -NA      OT Stop Time 1028  -NA      OT Time Calculation (min) 27 min  -NA      Total Timed Code Minutes- OT 0 minute(s)  -NA      OT Received On 03/02/22  -NA      OT - Next Appointment 03/03/22  -NA      OT Goal Re-Cert Due Date 03/16/22  -NA            User Key  (r) = Recorded By, (t) = Taken By, (c) =  Cosigned By    Initials Name Provider Type    NA Jennifer Doyle OT Occupational Therapist              Therapy Charges for Today     Code Description Service Date Service Provider Modifiers Qty    [id number] HC OT EVAL MOD COMPLEXITY 4 3/2/2022 Jennifer Doyle OT GO 1               Jennifer Doyle OT  3/2/2022

## 2022-03-02 NOTE — PLAN OF CARE
Goal Outcome Evaluation:  Plan of Care Reviewed With: patient           Outcome Summary: 76 y/o female who fell at home resulting in R wrist fx on 3/1 , s/p closed reduction and splint application. Pt lives alone and normally does not use a..d for mobility. At time of eval, pt needed min/mod A for supine to sit. Tasha of 1-2 to come to standing with post leaning upon immediate standing. Pt able to ambulate 20 ft x 2 with HHA, steady with inc time up except +loss of balance when turning. Pt is not safe to return home alone at this time. Recommend short IP rehab for mobility and balance training as well as ADL retraining as pt is R hand dominant and now having difficulty with ADL's. Pt voiced not wanting IP rehab.

## 2022-03-02 NOTE — PLAN OF CARE
Goal Outcome Evaluation:     Problem: Adult Inpatient Plan of Care  Goal: Plan of Care Review  Outcome: Ongoing, Progressing  Goal: Patient-Specific Goal (Individualized)  Outcome: Ongoing, Progressing  Goal: Absence of Hospital-Acquired Illness or Injury  Outcome: Ongoing, Progressing  Intervention: Identify and Manage Fall Risk  Recent Flowsheet Documentation  Taken 3/2/2022 0100 by Colette Bergman RN  Safety Promotion/Fall Prevention:   safety round/check completed   room organization consistent   fall prevention program maintained   clutter free environment maintained   assistive device/personal items within reach  Taken 3/1/2022 2315 by Colette Bergman RN  Safety Promotion/Fall Prevention:   safety round/check completed   room organization consistent   fall prevention program maintained   clutter free environment maintained   assistive device/personal items within reach  Intervention: Prevent Infection  Recent Flowsheet Documentation  Taken 3/2/2022 0100 by Colette Bergman RN  Infection Prevention:   hand hygiene promoted   personal protective equipment utilized   rest/sleep promoted  Taken 3/1/2022 2315 by Colette Bergman RN  Infection Prevention:   hand hygiene promoted   personal protective equipment utilized   rest/sleep promoted  Goal: Optimal Comfort and Wellbeing  Outcome: Ongoing, Progressing  Goal: Readiness for Transition of Care  Outcome: Ongoing, Progressing     Problem: Fall Injury Risk  Goal: Absence of Fall and Fall-Related Injury  Outcome: Ongoing, Progressing  Intervention: Promote Injury-Free Environment  Recent Flowsheet Documentation  Taken 3/2/2022 0100 by Colette Bergman RN  Safety Promotion/Fall Prevention:   safety round/check completed   room organization consistent   fall prevention program maintained   clutter free environment maintained   assistive device/personal items within reach  Taken 3/1/2022 2315 by Colette Bergman RN  Safety Promotion/Fall Prevention:   safety round/check completed    room organization consistent   fall prevention program maintained   clutter free environment maintained   assistive device/personal items within reach     Problem: Skin Injury Risk Increased  Goal: Skin Health and Integrity  Outcome: Ongoing, Progressing     Problem: Pain Acute  Goal: Optimal Pain Control  Outcome: Ongoing, Progressing

## 2022-03-03 VITALS
OXYGEN SATURATION: 98 % | DIASTOLIC BLOOD PRESSURE: 82 MMHG | WEIGHT: 182.98 LBS | HEIGHT: 66 IN | SYSTOLIC BLOOD PRESSURE: 162 MMHG | HEART RATE: 104 BPM | RESPIRATION RATE: 18 BRPM | BODY MASS INDEX: 29.41 KG/M2 | TEMPERATURE: 98.3 F

## 2022-03-03 LAB
ANION GAP SERPL CALCULATED.3IONS-SCNC: 8 MMOL/L (ref 5–15)
APTT PPP: 24.6 SECONDS (ref 24–31)
BASOPHILS # BLD AUTO: 0 10*3/MM3 (ref 0–0.2)
BASOPHILS NFR BLD AUTO: 0.3 % (ref 0–1.5)
BUN SERPL-MCNC: 10 MG/DL (ref 8–23)
BUN/CREAT SERPL: 19.6 (ref 7–25)
CALCIUM SPEC-SCNC: 9.6 MG/DL (ref 8.6–10.5)
CHLORIDE SERPL-SCNC: 100 MMOL/L (ref 98–107)
CO2 SERPL-SCNC: 28 MMOL/L (ref 22–29)
CREAT SERPL-MCNC: 0.51 MG/DL (ref 0.57–1)
DEPRECATED RDW RBC AUTO: 45.5 FL (ref 37–54)
EGFRCR SERPLBLD CKD-EPI 2021: 97.5 ML/MIN/1.73
EOSINOPHIL # BLD AUTO: 0 10*3/MM3 (ref 0–0.4)
EOSINOPHIL NFR BLD AUTO: 0.4 % (ref 0.3–6.2)
ERYTHROCYTE [DISTWIDTH] IN BLOOD BY AUTOMATED COUNT: 13.6 % (ref 12.3–15.4)
GLUCOSE SERPL-MCNC: 153 MG/DL (ref 65–99)
HCT VFR BLD AUTO: 31.8 % (ref 34–46.6)
HGB BLD-MCNC: 10.9 G/DL (ref 12–15.9)
INR PPP: 0.96 (ref 0.93–1.1)
LYMPHOCYTES # BLD AUTO: 0.4 10*3/MM3 (ref 0.7–3.1)
LYMPHOCYTES NFR BLD AUTO: 5.4 % (ref 19.6–45.3)
MCH RBC QN AUTO: 32.8 PG (ref 26.6–33)
MCHC RBC AUTO-ENTMCNC: 34.2 G/DL (ref 31.5–35.7)
MCV RBC AUTO: 95.8 FL (ref 79–97)
MONOCYTES # BLD AUTO: 1 10*3/MM3 (ref 0.1–0.9)
MONOCYTES NFR BLD AUTO: 12 % (ref 5–12)
NEUTROPHILS NFR BLD AUTO: 6.6 10*3/MM3 (ref 1.7–7)
NEUTROPHILS NFR BLD AUTO: 81.9 % (ref 42.7–76)
NRBC BLD AUTO-RTO: 0.1 /100 WBC (ref 0–0.2)
PLATELET # BLD AUTO: 140 10*3/MM3 (ref 140–450)
PMV BLD AUTO: 7.6 FL (ref 6–12)
POTASSIUM SERPL-SCNC: 4.2 MMOL/L (ref 3.5–5.2)
PROTHROMBIN TIME: 10.7 SECONDS (ref 9.6–11.7)
QT INTERVAL: 441 MS
RBC # BLD AUTO: 3.32 10*6/MM3 (ref 3.77–5.28)
SODIUM SERPL-SCNC: 136 MMOL/L (ref 136–145)
WBC NRBC COR # BLD: 8.1 10*3/MM3 (ref 3.4–10.8)

## 2022-03-03 PROCEDURE — 63710000001 TACROLIMUS PER 1 MG: Performed by: STUDENT IN AN ORGANIZED HEALTH CARE EDUCATION/TRAINING PROGRAM

## 2022-03-03 PROCEDURE — 85610 PROTHROMBIN TIME: CPT | Performed by: PHYSICIAN ASSISTANT

## 2022-03-03 PROCEDURE — 63710000001 POLYETHYLENE GLYCOL 17 G PACK: Performed by: PHYSICIAN ASSISTANT

## 2022-03-03 PROCEDURE — 63710000001 HYDROCODONE-ACETAMINOPHEN 7.5-325 MG TABLET: Performed by: STUDENT IN AN ORGANIZED HEALTH CARE EDUCATION/TRAINING PROGRAM

## 2022-03-03 PROCEDURE — 80048 BASIC METABOLIC PNL TOTAL CA: CPT | Performed by: PHYSICIAN ASSISTANT

## 2022-03-03 PROCEDURE — A9270 NON-COVERED ITEM OR SERVICE: HCPCS | Performed by: STUDENT IN AN ORGANIZED HEALTH CARE EDUCATION/TRAINING PROGRAM

## 2022-03-03 PROCEDURE — G0378 HOSPITAL OBSERVATION PER HR: HCPCS

## 2022-03-03 PROCEDURE — 63710000001 AZATHIOPRINE PER 50 MG: Performed by: STUDENT IN AN ORGANIZED HEALTH CARE EDUCATION/TRAINING PROGRAM

## 2022-03-03 PROCEDURE — 85730 THROMBOPLASTIN TIME PARTIAL: CPT | Performed by: PHYSICIAN ASSISTANT

## 2022-03-03 PROCEDURE — A9270 NON-COVERED ITEM OR SERVICE: HCPCS | Performed by: PHYSICIAN ASSISTANT

## 2022-03-03 PROCEDURE — 63710000001 METOPROLOL TARTRATE 50 MG TABLET: Performed by: STUDENT IN AN ORGANIZED HEALTH CARE EDUCATION/TRAINING PROGRAM

## 2022-03-03 PROCEDURE — 63710000001 POTASSIUM CHLORIDE 20 MEQ PACK: Performed by: STUDENT IN AN ORGANIZED HEALTH CARE EDUCATION/TRAINING PROGRAM

## 2022-03-03 PROCEDURE — 63710000001 LEVOTHYROXINE 50 MCG TABLET: Performed by: STUDENT IN AN ORGANIZED HEALTH CARE EDUCATION/TRAINING PROGRAM

## 2022-03-03 PROCEDURE — 94799 UNLISTED PULMONARY SVC/PX: CPT

## 2022-03-03 PROCEDURE — 85025 COMPLETE CBC W/AUTO DIFF WBC: CPT | Performed by: PHYSICIAN ASSISTANT

## 2022-03-03 PROCEDURE — 63710000001 PREDNISONE PER 5 MG: Performed by: STUDENT IN AN ORGANIZED HEALTH CARE EDUCATION/TRAINING PROGRAM

## 2022-03-03 PROCEDURE — 0 MORPHINE SULFATE 4 MG/ML SOLUTION: Performed by: STUDENT IN AN ORGANIZED HEALTH CARE EDUCATION/TRAINING PROGRAM

## 2022-03-03 PROCEDURE — 63710000001 FUROSEMIDE 40 MG TABLET: Performed by: STUDENT IN AN ORGANIZED HEALTH CARE EDUCATION/TRAINING PROGRAM

## 2022-03-03 PROCEDURE — 97116 GAIT TRAINING THERAPY: CPT

## 2022-03-03 RX ORDER — HYDROCODONE BITARTRATE AND ACETAMINOPHEN 7.5; 325 MG/1; MG/1
1 TABLET ORAL EVERY 4 HOURS PRN
Qty: 30 TABLET | Refills: 0 | Status: SHIPPED | OUTPATIENT
Start: 2022-03-03 | End: 2022-03-08

## 2022-03-03 RX ORDER — POLYETHYLENE GLYCOL 3350 17 G/17G
17 POWDER, FOR SOLUTION ORAL DAILY
Status: DISCONTINUED | OUTPATIENT
Start: 2022-03-03 | End: 2022-03-03 | Stop reason: HOSPADM

## 2022-03-03 RX ADMIN — POLYETHYLENE GLYCOL 3350 17 G: 17 POWDER, FOR SOLUTION ORAL at 09:03

## 2022-03-03 RX ADMIN — PREDNISONE 5 MG: 5 TABLET ORAL at 09:03

## 2022-03-03 RX ADMIN — METOPROLOL TARTRATE 50 MG: 50 TABLET, FILM COATED ORAL at 09:03

## 2022-03-03 RX ADMIN — HYDROCODONE BITARTRATE AND ACETAMINOPHEN 1 TABLET: 7.5; 325 TABLET ORAL at 14:02

## 2022-03-03 RX ADMIN — TACROLIMUS 1 MG: 1 CAPSULE ORAL at 09:03

## 2022-03-03 RX ADMIN — Medication 10 ML: at 09:03

## 2022-03-03 RX ADMIN — HYDROCODONE BITARTRATE AND ACETAMINOPHEN 1 TABLET: 7.5; 325 TABLET ORAL at 06:59

## 2022-03-03 RX ADMIN — POTASSIUM CHLORIDE 20 MEQ: 1.5 POWDER, FOR SOLUTION ORAL at 09:03

## 2022-03-03 RX ADMIN — FUROSEMIDE 40 MG: 40 TABLET ORAL at 09:03

## 2022-03-03 RX ADMIN — MORPHINE SULFATE 2 MG: 4 INJECTION INTRAVENOUS at 10:17

## 2022-03-03 RX ADMIN — AZATHIOPRINE 50 MG: 50 TABLET ORAL at 09:03

## 2022-03-03 RX ADMIN — LEVOTHYROXINE SODIUM 50 MCG: 0.05 TABLET ORAL at 06:01

## 2022-03-03 NOTE — PLAN OF CARE
Goal Outcome Evaluation:  Plan of Care Reviewed With: patient        Progress: improving  Outcome Summary: Managed pain with norco and 1 dose 2 mg IV morphine. Noted blood coming out from elbow, reinforced with 4x4s's and ace wrap--bleeding controlled. Possible from skin tear, pls see Dr. Townsend note.  Recheck blood counts--no significant changes. Up in chair for couple hours. Pt received complete bath. Applied orange cap cream on the coccyx area. Pt ambulated in the room with physical therapy. Small Bmx1 this shift. A&Ox3. 2L NC. Safety maintained, ctm.

## 2022-03-03 NOTE — DISCHARGE SUMMARY
[facility]    Provider, No Known    CHIEF COMPLAINT:     Right wrist pain    HISTORY OF PRESENT ILLNESS:    Obtained from H&P on 3/1/2022:  Ms. Carcamo is a 77 y.o.  presents to [facility] complaining of severe right wrist pain.        77-year-old female presents to the ER with a chief complaint of severe right arm pain after slipping and falling while turning as she was folding laundry.  The patient states she is not sure what happened because it all happened so quickly but she does not recall any dizziness or lightheadedness prior to the fall and is pretty sure she slipped or lost her balance.  She felt well earlier in the day without any complaints of discomfort.  The patient reports that she had difficulty getting up after the fall which would have been difficult even with out a fractured arm.  The patient denies loss of consciousness.  The patient reports that she was out of her diuretic medication for 5 days, but resumed it yesterday.  The patient reports that if she needs surgical repair of her wrist her family is requesting that she go to a hospital in [facility].     Review of records: Patient is status post kidney transplant with immunosuppressive medications to prevent rejection on 6/30/17.  The patient had been on dialysis x7 years and has a shunt in her right upper extremity prior to getting the transplant. 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 Mcconnell on 3/8/16.     3/2/2022: Patient reports she is done generally well postop with pain moderately controlled with IV pain medication reporting that it is typically better controlled when she takes p.o. medication.  No other significant complaints are reported.    3/30/2022: Patient reports she did well throughout the night though she was somewhat restless.  Pain is adequately controlled in right upper extremity    Fall          Past Medical History:   Diagnosis Date   • Cancer (HCC)      lung   • History of appendectomy    • Hypertension    • Renal disease    • Hartman syndrome      Past Surgical History:   Procedure Laterality Date   • BREAST SURGERY Left     cysts rmeoved   • CLOSED REDUCTION WRIST FRACTURE Right 3/1/2022    Procedure: WRIST CLOSED REDUCTION;  Surgeon: Gaudencio Townsend MD;  Location: [facility] MAIN OR;  Service: Orthopedics;  Laterality: Right;   • CYSTOSCOPY     • HYSTERECTOMY     • LUNG LOBECTOMY Right    • TRANSPLANTATION RENAL       Family History   Problem Relation Age of Onset   • No Known Problems Mother    • No Known Problems Father      Social History     Tobacco Use   • Smoking status: Former Smoker   • Smokeless tobacco: Never Used   Vaping Use   • Vaping Use: Former   Substance Use Topics   • Alcohol use: Never   • Drug use: Never     Medications Prior to Admission   Medication Sig Dispense Refill Last Dose   • azaTHIOprine (IMURAN) 50 MG tablet Take 50 mg by mouth 2 (Two) Times a Day.      • diazePAM (VALIUM) 5 MG tablet Take 5 mg by mouth 2 (Two) Times a Day As Needed for Anxiety.      • furosemide (LASIX) 40 MG tablet Take 40 mg by mouth Daily.      • levothyroxine (SYNTHROID, LEVOTHROID) 50 MCG tablet Take 50 mcg by mouth Daily.      • metoprolol tartrate (LOPRESSOR) 50 MG tablet Take 50 mg by mouth Daily.   2/28/2022 at Unknown time   • potassium chloride (KLOR-CON) 20 MEQ packet Take 20 mEq by mouth Daily.      • predniSONE (DELTASONE) 5 MG tablet Take 5 mg by mouth Daily.      • simvastatin (ZOCOR) 20 MG tablet Take 20 mg by mouth Every Night.      • tacrolimus (PROGRAF) 1 MG capsule Take 1 mg by mouth 2 (Two) Times a Day.        Allergies:  Zolpidem    Immunization History   Administered Date(s) Administered   • Tdap 03/01/2022           REVIEW OF SYSTEMS:    ROS   Constitutional: Negative for chills and fever.   Musculoskeletal: Positive for falls, joint pain and joint swelling. Negative for neck pain.   All other systems reviewed and are negative.    Vital  Signs  Temp:  [97.8 °F (36.6 °C)-98.6 °F (37 °C)] 98.3 °F (36.8 °C)  Heart Rate:  [] 104  Resp:  [18] 18  BP: (118-181)/(60-88) 162/82          Physical Exam:  Physical Exam  Vitals reviewed.   Constitutional:       General: She is not in acute distress.     Appearance: Normal appearance. She is normal weight. She is not ill-appearing, toxic-appearing or diaphoretic.   HENT:      Head: Normocephalic and atraumatic.      Right Ear: External ear normal.      Left Ear: External ear normal.      Nose: Nose normal.      Mouth/Throat:      Mouth: Mucous membranes are moist.   Eyes:      Extraocular Movements: Extraocular movements intact.   Cardiovascular:      Rate and Rhythm: Normal rate and regular rhythm.      Pulses: Normal pulses.      Heart sounds: Normal heart sounds.   Pulmonary:      Effort: Pulmonary effort is normal.      Breath sounds: Rhonchi present.   Abdominal:      General: Bowel sounds are normal. There is no distension.      Palpations: Abdomen is soft.      Tenderness: There is no abdominal tenderness.   Musculoskeletal:         General: Signs of injury present. Normal range of motion.      Cervical back: Normal range of motion.      Right lower leg: No edema.      Left lower leg: No edema.   Skin:     General: Skin is warm and dry.      Capillary Refill: Capillary refill takes less than 2 seconds.   Neurological:      General: No focal deficit present.      Mental Status: She is alert and oriented to person, place, and time.      Comments: Right upper extremity splinted with normal distal neurovascular function noted in fingers   Psychiatric:         Mood and Affect: Mood normal.         Behavior: Behavior normal.         Thought Content: Thought content normal.         Judgment: Judgment normal.         Emotional Behavior:   Normal   Debilities:  None  Results Review:    I reviewed the patient's new clinical results.  Lab Results (most recent)     Procedure Component Value Units Date/Time     Basic Metabolic Panel [[id number]]  (Abnormal) Collected: 03/02/22 0705    Specimen: Blood Updated: 03/02/22 0740     Glucose 160 mg/dL      BUN 10 mg/dL      Creatinine 0.52 mg/dL      Sodium 139 mmol/L      Potassium 4.0 mmol/L      Chloride 103 mmol/L      CO2 29.0 mmol/L      Calcium 9.7 mg/dL      BUN/Creatinine Ratio 19.2     Anion Gap 7.0 mmol/L      eGFR 97.0 mL/min/1.73      Comment: National Kidney Foundation and American Society of Nephrology (ASN) Task Force recommended calculation based on the Chronic Kidney Disease Epidemiology Collaboration (CKD-EPI) equation refit without adjustment for race.       Narrative:      GFR Normal >60  Chronic Kidney Disease <60  Kidney Failure <15      CBC Auto Differential [[id number]]  (Abnormal) Collected: 03/02/22 0705    Specimen: Blood Updated: 03/02/22 0720     WBC 7.70 10*3/mm3      RBC 3.08 10*6/mm3      Hemoglobin 10.2 g/dL      Hematocrit 29.4 %      MCV 95.5 fL      MCH 33.2 pg      MCHC 34.7 g/dL      RDW 13.6 %      RDW-SD 45.1 fl      MPV 7.3 fL      Platelets 106 10*3/mm3      Neutrophil % 79.7 %      Lymphocyte % 5.6 %      Monocyte % 14.5 %      Eosinophil % 0.1 %      Basophil % 0.1 %      Neutrophils, Absolute 6.10 10*3/mm3      Lymphocytes, Absolute 0.40 10*3/mm3      Monocytes, Absolute 1.10 10*3/mm3      Eosinophils, Absolute 0.00 10*3/mm3      Basophils, Absolute 0.00 10*3/mm3      nRBC 0.1 /100 WBC     COVID PRE-OP / PRE-PROCEDURE SCREENING ORDER (NO ISOLATION) - Swab, Nasopharynx [[id number]]  (Normal) Collected: 03/01/22 0450    Specimen: Swab from Nasopharynx Updated: 03/01/22 0524    Narrative:      The following orders were created for panel order COVID PRE-OP / PRE-PROCEDURE SCREENING ORDER (NO ISOLATION) - Swab, Nasopharynx.  Procedure                               Abnormality         Status                     ---------                               -----------         ------                     COVID-19,CEPHEID/JACK,CO...[[id number]]  Normal               Final result                 Please view results for these tests on the individual orders.    COVID-19,CEPHEID/JACK,COR/TWILA/PAD/DEVAN IN-HOUSE(OR EMERGENT/ADD-ON),NP SWAB IN TRANSPORT MEDIA 3-4 HR TAT, RT-PCR - Swab, Nasopharynx [[id number]]  (Normal) Collected: 03/01/22 0450    Specimen: Swab from Nasopharynx Updated: 03/01/22 0524     COVID19 Not Detected    Narrative:      Fact sheet for providers: https://www.fda.gov/media/355733/download     Fact sheet for patients: https://www.fda.gov/media/344905/download  Fact sheet for providers: https://www.fda.gov/media/104184/download    Fact sheet for patients: https://www.fda.gov/media/698790/download    Test performed by PCR.    Basic Metabolic Panel [[id number]]  (Abnormal) Collected: 03/01/22 0403    Specimen: Blood Updated: 03/01/22 0434     Glucose 121 mg/dL      BUN 12 mg/dL      Creatinine 0.68 mg/dL      Sodium 140 mmol/L      Potassium 4.5 mmol/L      Comment: Slight hemolysis detected by analyzer. Results may be affected.        Chloride 101 mmol/L      CO2 26.0 mmol/L      Calcium 10.3 mg/dL      BUN/Creatinine Ratio 17.6     Anion Gap 13.0 mmol/L      eGFR 91.0 mL/min/1.73      Comment: National Kidney Foundation and American Society of Nephrology (ASN) Task Force recommended calculation based on the Chronic Kidney Disease Epidemiology Collaboration (CKD-EPI) equation refit without adjustment for race.       Narrative:      GFR Normal >60  Chronic Kidney Disease <60  Kidney Failure <15      Protime-INR [[id number]]  (Normal) Collected: 03/01/22 0403    Specimen: Blood Updated: 03/01/22 0424     Protime 11.1 Seconds      INR 1.00    aPTT [[id number]]  (Normal) Collected: 03/01/22 0403    Specimen: Blood Updated: 03/01/22 0424     PTT 24.2 seconds     CBC & Differential [[id number]]  (Abnormal) Collected: 03/01/22 0403    Specimen: Blood Updated: 03/01/22 0414    Narrative:      The following orders were created for panel order CBC &  Differential.  Procedure                               Abnormality         Status                     ---------                               -----------         ------                     CBC Auto Differential[[id number]]        Abnormal            Final result                 Please view results for these tests on the individual orders.    CBC Auto Differential [[id number]]  (Abnormal) Collected: 03/01/22 0403    Specimen: Blood Updated: 03/01/22 0414     WBC 8.10 10*3/mm3      RBC 3.61 10*6/mm3      Hemoglobin 11.6 g/dL      Hematocrit 34.3 %      MCV 95.0 fL      MCH 32.1 pg      MCHC 33.8 g/dL      RDW 13.6 %      RDW-SD 45.5 fl      MPV 7.0 fL      Platelets 132 10*3/mm3      Neutrophil % 82.6 %      Lymphocyte % 6.1 %      Monocyte % 10.5 %      Eosinophil % 0.2 %      Basophil % 0.6 %      Neutrophils, Absolute 6.70 10*3/mm3      Lymphocytes, Absolute 0.50 10*3/mm3      Monocytes, Absolute 0.90 10*3/mm3      Eosinophils, Absolute 0.00 10*3/mm3      Basophils, Absolute 0.00 10*3/mm3      nRBC 0.1 /100 WBC           Imaging Results (Most Recent)     Procedure Component Value Units Date/Time    CT Upper Extremity Right Without Contrast [[id number]] Collected: 03/01/22 2328     Updated: 03/01/22 2338    Narrative:      EXAM:  CT UPPER EXTREMITY RIGHT WO CONTRAST     DATE: 3/1/2022 7:15 PM    HISTORY: Closed reduction rt wrist fx    TECHNIQUE: CT dose lowering techniques were used, to include: automated exposure control, adjustment for patient size, and/or use of iterative reconstruction.    COMPARISON:  Radiograph 2/28/2022    FINDINGS:  Comminuted, distal radial intra-articular fracture, with improved alignment.  Improved alignment, styloid fracture  Chondrocalcinosis at the radiocarpal joint, and near the scapholunate articulation.  Possible irregularity at the dorsum of the lunate  Triscaphe joint degenerative findings.  Osteopenia.      Impression:        1.  Reduction and improved alignment of comminuted  intra-articular distal radial fracture.  2.  Improved alignment of ulnar styloid fracture.  3.  Question irregularity of the dorsum of the lunate, correlate for symptoms.  4.  Chondrocalcinosis    Electronically signed by:  yM River M.D.    3/1/2022 9:37 PM    XR Wrist 2 View Right [[id number]] Resulted: 03/01/22 1939     Updated: 03/01/22 1946    FL < 1 Hour [[id number]] Resulted: 03/01/22 1939     Updated: 03/01/22 1946    XR Wrist 3+ View Right [[id number]] Collected: 03/01/22 0315     Updated: 03/01/22 0320    Narrative:      EXAMINATION: XR WRIST 3+ VW RIGHT, XR FOREARM 2 VW RIGHT, XR HUMERUS RIGHT      DATE OF EXAM: 2/28/2022 11:28 PM    HISTORY: Trauma    COMPARISON: None.    FINDINGS:     The bones are severely demineralized. There is a comminuted and impacted dorsally displaced intra-articular distal radial fracture as well as a fracture of the ulnar styloid process. No other acute bony abnormalities are identified in the right wrist,   forearm or humerus. There is osteoarthritis of multiple joints.        Impression:        1.  Comminuted and impacted dorsally displaced intra-articular distal radial fracture.  2.  Ulnar styloid process fracture.  3.  Severe demineralization.  4.  Osteoarthritis of multiple joints.     Electronically signed by:  Zach Zendejas M.D.    3/1/2022 1:18 AM    XR Forearm 2 View Right [[id number]] Collected: 03/01/22 0315     Updated: 03/01/22 0320    Narrative:      EXAMINATION: XR WRIST 3+ VW RIGHT, XR FOREARM 2 VW RIGHT, XR HUMERUS RIGHT      DATE OF EXAM: 2/28/2022 11:28 PM    HISTORY: Trauma    COMPARISON: None.    FINDINGS:     The bones are severely demineralized. There is a comminuted and impacted dorsally displaced intra-articular distal radial fracture as well as a fracture of the ulnar styloid process. No other acute bony abnormalities are identified in the right wrist,   forearm or humerus. There is osteoarthritis of multiple joints.        Impression:         1.  Comminuted and impacted dorsally displaced intra-articular distal radial fracture.  2.  Ulnar styloid process fracture.  3.  Severe demineralization.  4.  Osteoarthritis of multiple joints.     Electronically signed by:  Zach Zendejas M.D.    3/1/2022 1:18 AM    XR Humerus Right [[id number]] Collected: 03/01/22 0315     Updated: 03/01/22 0320    Narrative:      EXAMINATION: XR WRIST 3+ VW RIGHT, XR FOREARM 2 VW RIGHT, XR HUMERUS RIGHT      DATE OF EXAM: 2/28/2022 11:28 PM    HISTORY: Trauma    COMPARISON: None.    FINDINGS:     The bones are severely demineralized. There is a comminuted and impacted dorsally displaced intra-articular distal radial fracture as well as a fracture of the ulnar styloid process. No other acute bony abnormalities are identified in the right wrist,   forearm or humerus. There is osteoarthritis of multiple joints.        Impression:        1.  Comminuted and impacted dorsally displaced intra-articular distal radial fracture.  2.  Ulnar styloid process fracture.  3.  Severe demineralization.  4.  Osteoarthritis of multiple joints.     Electronically signed by:  Zach Zendejas M.D.    3/1/2022 1:18 AM        reviewed    ECG/EMG Results (most recent)     Procedure Component Value Units Date/Time    ECG 12 Lead [[id number]] Collected: 03/01/22 1157     Updated: 03/03/22 0811     QT Interval 441 ms     Narrative:      HEART RATE= 58  bpm  RR Interval= 1036  ms  NM Interval= 152  ms  P Horizontal Axis= -21  deg  P Front Axis= 63  deg  QRSD Interval= 80  ms  QT Interval= 441  ms  QRS Axis= -18  deg  T Wave Axis= 21  deg  - ABNORMAL ECG -  Sinus bradycardia  Atrial premature complex  Left ventricular hypertrophy  Nonspecific T abnormalities, anterior leads  When compared with ECG of 29-Dec-2015 11:41:06,  Significant repolarization change  Significant axis, voltage or hypertrophy change  Electronically Signed By: Elliot Gomes (TWILA) 03-Mar-2022 08:11:27  Date and Time of Study:  2022-03-01 11:57:47        reviewed    Results for orders placed during the hospital encounter of 10/14/20    Duplex venous lower extremity bilateral CAR    Interpretation Summary  · Normal bilateral lower extremity venous duplex scan.      Results for orders placed during the hospital encounter of 10/14/20    Adult Transthoracic Echo Complete W/ Cont if Necessary Per Protocol    Interpretation Summary  · Left ventricular wall thickness is consistent with concentric hypertrophy.  · Estimated left ventricular EF = 70% Left ventricular systolic function is normal.  · The left atrial cavity is moderately dilated.  · The right atrial cavity is mildly dilated.  · Mild mitral annular calcification is present.  · Mild mitral valve regurgitation is present.  · Left ventricular diastolic function is consistent with (grade I) impaired relaxation.  · Estimated right ventricular systolic pressure from tricuspid regurgitation is mildly elevated (35-45 mmHg).      Microbiology Results (last 10 days)     Procedure Component Value - Date/Time    COVID PRE-OP / PRE-PROCEDURE SCREENING ORDER (NO ISOLATION) - Swab, Nasopharynx [[id number]]  (Normal) Collected: 03/01/22 0450    Lab Status: Final result Specimen: Swab from Nasopharynx Updated: 03/01/22 0524    Narrative:      The following orders were created for panel order COVID PRE-OP / PRE-PROCEDURE SCREENING ORDER (NO ISOLATION) - Swab, Nasopharynx.  Procedure                               Abnormality         Status                     ---------                               -----------         ------                     COVID-19,CEPHEID/JACK,CO...[[id number]]  Normal              Final result                 Please view results for these tests on the individual orders.    COVID-19,CEPHEID/JACK,COR/TWILA/PAD/DEVAN IN-HOUSE(OR EMERGENT/ADD-ON),NP SWAB IN TRANSPORT MEDIA 3-4 HR TAT, RT-PCR - Swab, Nasopharynx [[id number]]  (Normal) Collected: 03/01/22 0450    Lab Status: Final result  Specimen: Swab from Nasopharynx Updated: 03/01/22 0524     COVID19 Not Detected    Narrative:      Fact sheet for providers: https://www.fda.gov/media/731026/download     Fact sheet for patients: https://www.fda.gov/media/148498/download  Fact sheet for providers: https://www.fda.gov/media/564199/download    Fact sheet for patients: https://www.fda.gov/media/454310/download    Test performed by PCR.          Assessment/Plan     Right wrist fracture       Right wrist fracture, status post fall: Orthopedic surgery consult; analgesics and antiemetics as needed  -Patient reports that if she needs surgery her family is requesting that she go to a facility in [place]              -Right radial ulnar intra-articular closed reduction and splinting performed successfully on 3/1/2022              -Continue Norco at discharge              -Follow-up with orthopedic surgery on 3/7/2022 as instructed  -Patient initially planned for discharge on 3/2/2022 however in the afternoon she reconsidered inpatient rehab and has decided she would like to pursue that option at this time, case management has been consulted to determine if patient will need pre-CERT and to assist in placement  -Just prior to discharge RN noticed bandage on right arm saturated with blood as well as some on her sheets on the posterior portion of patient's elbow where there was a documented skin tear from fall.  Surgeon was consulted who recommended additional bandaging with Ace wraps so as to not disturb the reduction.  -CBC, INR/PT and PTT were assessed and found to be stable  -Bleeding controlled with additional pressure        Antirejection therapy, chronic: Continue amiodarone; continue prednisone; continue Prograf     HLD, chronic: Continue Lipitor     CKD with history of renal transplant 2017: Continue Lasix with potassium.  Serum creatinine: 0.52 with a BUN of 10     Anxiety, chronic: Continue Valium as needed     Hypertension, chronic: Continue  metoprolol; continue Lasix; continue potassium     Hypothyroidism, chronic: Continue levothyroxine     Chronic pain: Continue hydrocodone 7.5 mg as needed    I discussed the patients findings and my recommendations with patient and nursing staff.     Discharge Diagnosis:      Right wrist fracture      Hospital Course  Patient is a 77 y.o. female presented with right wrist pain following a fall with an HPI noted above.  CBC and CMP were assessed and found to be generally unremarkable.  Initial EKG showed a comminuted impacted dorsally displaced intra-articular distal radial fracture along with an ulnar styloid process fracture and severe demineralization with osteoarthritis of multiple joints.  She was admitted for analgesia as well as orthopedic consultation which was performed on 3/1/2022 and provider performing closed reduction and splinting successfully.  Patient reports that she did well throughout the night with pain moderately managed, better with p.o. analgesics.  Distal neurovascular function remained intact and subsequent CT of upper extremity showed reduction with improved alignment of the comminuted intra-articular distal radial fracture with improved alignment of the ulnar styloid fracture with questionable irregularity of the dorsum of the lunate also noted as well as chondrocalcinosis.  Orthopedic surgery evaluated patient in the morning following procedure and notes she is in good condition for discharge with close follow-up on an outpatient basis with next follow-up plan for 3/7/2022.  Patient initially plan to return home and possibly see PT on an outpatient basis however following evaluation on 3/2/2021 she discussed with family and elected to pursue inpatient rehab which was arranged on 3/3/2022.  Just prior to her initial discharge patient was noted to have some saturation of the bandaging from her reduction on the posterior portion of her elbow with blood with some having leaked onto her pillow.   Surgeon was consulted who recommended adding an Ace wrap along with 4 x 4's to the area for increased pressure but not removing the bandaging so as not to complicate the reduction.  INR and PTT were obtained and found to be stable from previous findings.  CBC showed a hemoglobin slightly improved at 10.9.  Bleeding was adequately controlled with additional pressure.  At this time patient is felt to be in good condition for discharge with close follow-up with her PCP and orthopedic surgery.  Her full testing/results and plan were discussed with patient along with concerning/alarm symptoms which to call 911/return to the ED.  All questions were answered and she verbalizes her understanding and agreement.    Past Medical History:     Past Medical History:   Diagnosis Date   • Cancer (HCC)     lung   • History of appendectomy    • Hypertension    • Renal disease    • Hartman syndrome        Past Surgical History:     Past Surgical History:   Procedure Laterality Date   • BREAST SURGERY Left     cysts rmeoved   • CLOSED REDUCTION WRIST FRACTURE Right 3/1/2022    Procedure: WRIST CLOSED REDUCTION;  Surgeon: Gaudencio Townsend MD;  Location: [facility] MAIN OR;  Service: Orthopedics;  Laterality: Right;   • CYSTOSCOPY     • HYSTERECTOMY     • LUNG LOBECTOMY Right    • TRANSPLANTATION RENAL         Social History:   Social History     Socioeconomic History   • Marital status:    Tobacco Use   • Smoking status: Former Smoker   • Smokeless tobacco: Never Used   Vaping Use   • Vaping Use: Former   Substance and Sexual Activity   • Alcohol use: Never   • Drug use: Never   • Sexual activity: Defer       Procedures Performed    Procedure(s):  WRIST CLOSED REDUCTION  -------------------       Consults:   Consults     Date and Time Order Name Status Description    3/1/2022  3:39 AM IP Consult to Orthopedic Surgery            Condition on Discharge:     Stable    Discharge Disposition      Discharge Medications     Discharge Medications       New Medications      Instructions Start Date   HYDROcodone-acetaminophen 7.5-325 MG per tablet  Commonly known as: NORCO   1 tablet, Oral, Every 4 Hours PRN         Continue These Medications      Instructions Start Date   azaTHIOprine 50 MG tablet  Commonly known as: IMURAN   50 mg, Oral, 2 Times Daily      diazePAM 5 MG tablet  Commonly known as: VALIUM   5 mg, Oral, 2 Times Daily PRN      furosemide 40 MG tablet  Commonly known as: LASIX   40 mg, Oral, Daily      levothyroxine 50 MCG tablet  Commonly known as: SYNTHROID, LEVOTHROID   50 mcg, Oral, Daily      metoprolol tartrate 50 MG tablet  Commonly known as: LOPRESSOR   50 mg, Oral, Daily      potassium chloride 20 MEQ packet  Commonly known as: KLOR-CON   20 mEq, Oral, Daily      predniSONE 5 MG tablet  Commonly known as: DELTASONE   5 mg, Oral, Daily      simvastatin 20 MG tablet  Commonly known as: ZOCOR   20 mg, Oral, Nightly      tacrolimus 1 MG capsule  Commonly known as: PROGRAF   1 mg, Oral, 2 Times Daily             Discharge Diet:     Activity at Discharge:     Follow-up Appointments  No future appointments.  Additional Instructions for the Follow-ups that You Need to Schedule     Discharge Follow-up with PCP   As directed       Currently Documented PCP:    Provider, No Known    PCP Phone Number:    None     Follow Up Details: 5-7 days         Discharge Follow-up with Specified Provider: Orthopedic surgery   As directed      To: Orthopedic surgery    Follow Up Details: As scheduled               Test Results Pending at Discharge       Risk for Readmission (LACE) Score: 2 (3/3/2022  6:01 AM)          Zach Cage PA-C  03/03/22  16:36 EST

## 2022-03-03 NOTE — CASE MANAGEMENT/SOCIAL WORK
Continued Stay Note   Victor Hugo     Patient Name: Jaja Carcamo  MRN: [id number]  Today's Date: 3/3/2022    Admit Date: 3/1/2022     Discharge Plan     Row Name 03/03/22 1055       Plan    Plan Accepted at [facility]. No precert required    Plan Comments Notified by Alix with [facility] that patient is accepted to their facility at discharge. Patient notified and continues to be in  agreement with that plan.She confirms her son Aris will provide transportation at [place].    Row Name 03/03/22 1040       Plan    Plan Referral made to [facility]    Plan Comments Notified by Kathi with [facility] that patient doesn't meet criteria for acute inpatient rehab. Referral sent in [facility] to [facility], and marcia notified.               Discharge Codes    No documentation.               Expected Discharge Date and Time     Expected Discharge Date Expected Discharge Time    Mar 4, 2022         Jennifer Morales RN, [facility]  Office: [phone]  Fax: [phone]  [email or web address]      Phone communication or documentation only - no physical contact with patient or family.    Jennifer Morales RN

## 2022-03-03 NOTE — PLAN OF CARE
Goal Outcome Evaluation:  Plan of Care Reviewed With: patient      Jaja Carcamo presents with functional mobility impairments which indicate the need for skilled intervention. Issued and applied sling for RUE. Trialed with april walker for gait and noted improved standing balance. Platform rw may be difficult to use due to pt's elbow in splint in about 45 deg flexion which will make placement of RUE difficult. Pt continues to have difficulty with disha hygiene and donning/doffing underwear thus needs short IP rehab for ADL retraining as well as continued gait training with least restrictive a.d. Tolerating session today without incident. Will continue to follow and progress as tolerated.

## 2022-03-03 NOTE — CASE MANAGEMENT/SOCIAL WORK
Continued Stay Note   Victor Hugo     Patient Name: Jaja Carcamo  MRN: [id number]  Today's Date: 3/3/2022    Admit Date: 3/1/2022     Discharge Plan     Row Name 03/03/22 1040       Plan    Plan Referral made to [facility]    Plan Comments Notified by Kathi [facility] that patient doesn't meet criteria for acute inpatient rehab. Referral sent in [facility] to [facility], and marcia notified.               Discharge Codes    No documentation.               Expected Discharge Date and Time     Expected Discharge Date Expected Discharge Time    Mar 4, 2022         Jennifer Morales RN, [facility]  Office: [phone]  Fax: [phone]  [email or web address]        Phone communication or documentation only - no physical contact with patient or family.    Jennifer Morales RN

## 2022-03-03 NOTE — ANESTHESIA POSTPROCEDURE EVALUATION
Patient: Jaja Carcamo    Procedure Summary     Date: 03/01/22 Room / Location: [facility] OR 11 / [facility] MAIN OR    Anesthesia Start: 1740 Anesthesia Stop: 1824    Procedure: WRIST CLOSED REDUCTION (Right Hand) Diagnosis:     Surgeons: Gaudencio Townsend MD Provider: Moi Mendez MD    Anesthesia Type: MAC ASA Status: 3          Anesthesia Type: MAC    Vitals  Vitals Value Taken Time   /77 03/01/22 1905   Temp 97.9 °F (36.6 °C) 03/01/22 1905   Pulse 74 03/01/22 1905   Resp 19 03/01/22 1905   SpO2 94 % 03/01/22 1905           Post Anesthesia Care and Evaluation    Patient location during evaluation: PACU  Patient participation: complete - patient participated  Level of consciousness: awake  Pain scale: See nurse's notes for pain score.  Pain management: adequate  Airway patency: patent  Anesthetic complications: No anesthetic complications  PONV Status: none  Cardiovascular status: acceptable  Respiratory status: acceptable  Hydration status: acceptable    Comments: Patient seen and examined postoperatively; vital signs stable; SpO2 greater than or equal to 90%; cardiopulmonary status stable; nausea/vomiting adequately controlled; pain adequately controlled; no apparent anesthesia complications; patient discharged from anesthesia care when discharge criteria were met

## 2022-03-03 NOTE — NURSING NOTE
Notified Dr. Townsend thru secured chat last night that a serosanguineous  drainage was coming out from the reduction site.     Around 1145 am, pt complains blood coming out  from posterior right arm.  Pillow was saturated with  serosanguineous drainage.  Notified Tristin Cage this morning because pt's has a fistula on the right arm. Pt ha a fistula on the right arm.  Labs was ordered by Tristin.

## 2022-03-03 NOTE — THERAPY TREATMENT NOTE
Subjective: Pt agreeable to therapeutic plan of care.    Objective:   Up on BSC, supplemental O2 off. Pt reported feeling weak, lightheaded while performing disha hygiene; symptom improved upon re application of supplemental O2.    Bed mobility - N/A or Not attempted.  Transfers - CGA used april walker  Ambulation - 30 feet CGA with hemiwalker    Pain: 6 VAS  Education: Provided education on importance of mobility and skilled verbal / tactile cueing throughout intervention.     Assessment: Jaja Carcamo presents with functional mobility impairments which indicate the need for skilled intervention. Issued and applied sling for RUE. Trialed with april walker for gait and noted improved standing balance. Platform rw may be difficult to use due to pt's elbow in splint in about 45 deg flexion which will make placement of RUE difficult. Pt continues to have difficulty with disha hygiene and donning/doffing underwear thus needs short IP rehab for ADL retraining as well as continued gait training with least restrictive a.d. Tolerating session today without incident. Will continue to follow and progress as tolerated.     Plan/Recommendations:   Pt would benefit from Inpatient Rehabilitation placement at discharge from facility and requires cane at discharge.   Pt desires Inpatient Rehabilitation placement at discharge. Pt cooperative; agreeable to therapeutic recommendations and plan of care.     Basic Mobility 6-click:  Rollin = Total, A lot = 2, A little = 3; 4 = None  Supine>Sit:   1 = Total, A lot = 2, A little = 3; 4 = None   Sit>Stand with arms:  1 = Total, A lot = 2, A little = 3; 4 = None  Bed>Chair:   1 = Total, A lot = 2, A little = 3; 4 = None  Ambulate in room:  1 = Total, A lot = 2, A little = 3; 4 = None  3-5 Steps with railin = Total, A lot = 2, A little = 3; 4 = None  Score: 16    Post-Tx Position: Up in Chair, Staff Present and Call light and personal items within reach  PPE: gloves, surgical  mask, eyewear protection

## 2022-03-03 NOTE — PLAN OF CARE
Problem: Adult Inpatient Plan of Care  Goal: Plan of Care Review  Outcome: Ongoing, Progressing  Goal: Patient-Specific Goal (Individualized)  Outcome: Ongoing, Progressing  Goal: Absence of Hospital-Acquired Illness or Injury  Outcome: Ongoing, Progressing  Intervention: Identify and Manage Fall Risk  Recent Flowsheet Documentation  Taken 3/3/2022 0305 by Kimmy Sanches RN  Safety Promotion/Fall Prevention: safety round/check completed  Taken 3/3/2022 0018 by Kimmy Sanches RN  Safety Promotion/Fall Prevention: safety round/check completed  Taken 3/2/2022 2315 by Kimmy Sanches RN  Safety Promotion/Fall Prevention: safety round/check completed  Taken 3/2/2022 2115 by Kimmy Sanches RN  Safety Promotion/Fall Prevention: safety round/check completed  Taken 3/2/2022 1915 by Kimmy Sanches RN  Safety Promotion/Fall Prevention: safety round/check completed  Intervention: Prevent and Manage VTE (venous thromboembolism) Risk  Recent Flowsheet Documentation  Taken 3/2/2022 1915 by Kimmy Sanches RN  VTE Prevention/Management:   bilateral   dorsiflexion/plantar flexion performed  Intervention: Prevent Infection  Recent Flowsheet Documentation  Taken 3/3/2022 0305 by Kimmy Sanches RN  Infection Prevention:   rest/sleep promoted   single patient room provided   hand hygiene promoted  Taken 3/3/2022 0018 by Kimmy Sanches RN  Infection Prevention:   hand hygiene promoted   rest/sleep promoted   single patient room provided  Taken 3/2/2022 2315 by Kimmy Sanches RN  Infection Prevention:   hand hygiene promoted   rest/sleep promoted   single patient room provided  Taken 3/2/2022 2115 by Kimmy Sanches RN  Infection Prevention:   hand hygiene promoted   rest/sleep promoted   single patient room provided  Taken 3/2/2022 1915 by Kimmy Sanches RN  Infection Prevention:   hand hygiene promoted   rest/sleep promoted   single patient room provided  Goal: Optimal Comfort and Wellbeing  Outcome: Ongoing, Progressing  Intervention: Provide  Person-Centered Care  Recent Flowsheet Documentation  Taken 3/2/2022 1915 by Kimmy Sanches RN  Trust Relationship/Rapport:   care explained   questions answered   questions encouraged   thoughts/feelings acknowledged  Goal: Readiness for Transition of Care  Outcome: Ongoing, Progressing   Goal Outcome Evaluation:        Patient resting in bed.  Patient complained of pain earlier this shift and was resolved with oral pain medication.  Safety precautions are being utilized.  Vitals stable.  Will continue to monitor.

## 2022-05-18 ENCOUNTER — LAB (OUTPATIENT)
Dept: LAB | Facility: HOSPITAL | Age: 77
End: 2022-05-18

## 2022-05-18 ENCOUNTER — TRANSCRIBE ORDERS (OUTPATIENT)
Dept: ADMINISTRATIVE | Facility: HOSPITAL | Age: 77
End: 2022-05-18

## 2022-05-18 DIAGNOSIS — Z94.0 TRANSPLANTED KIDNEY: ICD-10-CM

## 2022-05-18 DIAGNOSIS — Z79.891 ENCOUNTER FOR LONG-TERM OPIATE ANALGESIC USE: ICD-10-CM

## 2022-05-18 DIAGNOSIS — Z79.891 ENCOUNTER FOR LONG-TERM METHADONE USE: ICD-10-CM

## 2022-05-18 DIAGNOSIS — Z94.0 KIDNEY REPLACED BY TRANSPLANT: ICD-10-CM

## 2022-05-18 DIAGNOSIS — Z13.1 SCREENING FOR DIABETES MELLITUS: ICD-10-CM

## 2022-05-18 DIAGNOSIS — Z00.00 ROUTINE GENERAL MEDICAL EXAMINATION AT A HEALTH CARE FACILITY: ICD-10-CM

## 2022-05-18 DIAGNOSIS — Z79.891 ENCOUNTER FOR LONG-TERM OPIATE ANALGESIC USE: Primary | ICD-10-CM

## 2022-05-18 LAB
ALBUMIN SERPL-MCNC: 3.9 G/DL (ref 3.5–5.2)
ALBUMIN/GLOB SERPL: 1.6 G/DL
ALP SERPL-CCNC: 62 U/L (ref 39–117)
ALT SERPL W P-5'-P-CCNC: 8 U/L (ref 1–33)
ANION GAP SERPL CALCULATED.3IONS-SCNC: 9.5 MMOL/L (ref 5–15)
AST SERPL-CCNC: 14 U/L (ref 1–32)
BASOPHILS # BLD AUTO: 0.02 10*3/MM3 (ref 0–0.2)
BASOPHILS NFR BLD AUTO: 0.5 % (ref 0–1.5)
BILIRUB SERPL-MCNC: 0.9 MG/DL (ref 0–1.2)
BUN SERPL-MCNC: 10 MG/DL (ref 8–23)
BUN/CREAT SERPL: 12.8 (ref 7–25)
CALCIUM SPEC-SCNC: 10.5 MG/DL (ref 8.6–10.5)
CHLORIDE SERPL-SCNC: 105 MMOL/L (ref 98–107)
CHOLEST SERPL-MCNC: 170 MG/DL (ref 0–200)
CO2 SERPL-SCNC: 28.5 MMOL/L (ref 22–29)
CREAT SERPL-MCNC: 0.78 MG/DL (ref 0.57–1)
CREAT UR-MCNC: 44.7 MG/DL
DEPRECATED RDW RBC AUTO: 48.4 FL (ref 37–54)
EGFRCR SERPLBLD CKD-EPI 2021: 79.3 ML/MIN/1.73
EOSINOPHIL # BLD AUTO: 0.03 10*3/MM3 (ref 0–0.4)
EOSINOPHIL NFR BLD AUTO: 0.8 % (ref 0.3–6.2)
ERYTHROCYTE [DISTWIDTH] IN BLOOD BY AUTOMATED COUNT: 13.7 % (ref 12.3–15.4)
GLOBULIN UR ELPH-MCNC: 2.5 GM/DL
GLUCOSE SERPL-MCNC: 91 MG/DL (ref 65–99)
HCT VFR BLD AUTO: 38.5 % (ref 34–46.6)
HGB BLD-MCNC: 12.5 G/DL (ref 12–15.9)
IMM GRANULOCYTES # BLD AUTO: 0.01 10*3/MM3 (ref 0–0.05)
IMM GRANULOCYTES NFR BLD AUTO: 0.3 % (ref 0–0.5)
LYMPHOCYTES # BLD AUTO: 0.77 10*3/MM3 (ref 0.7–3.1)
LYMPHOCYTES NFR BLD AUTO: 19.5 % (ref 19.6–45.3)
MCH RBC QN AUTO: 31.4 PG (ref 26.6–33)
MCHC RBC AUTO-ENTMCNC: 32.5 G/DL (ref 31.5–35.7)
MCV RBC AUTO: 96.7 FL (ref 79–97)
MONOCYTES # BLD AUTO: 0.56 10*3/MM3 (ref 0.1–0.9)
MONOCYTES NFR BLD AUTO: 14.2 % (ref 5–12)
NEUTROPHILS NFR BLD AUTO: 2.56 10*3/MM3 (ref 1.7–7)
NEUTROPHILS NFR BLD AUTO: 64.7 % (ref 42.7–76)
NRBC BLD AUTO-RTO: 0 /100 WBC (ref 0–0.2)
PLATELET # BLD AUTO: 120 10*3/MM3 (ref 140–450)
PMV BLD AUTO: 9.5 FL (ref 6–12)
POTASSIUM SERPL-SCNC: 3.9 MMOL/L (ref 3.5–5.2)
PROT ?TM UR-MCNC: 7 MG/DL
PROT SERPL-MCNC: 6.4 G/DL (ref 6–8.5)
PROT/CREAT UR: 156.6 MG/G CREA (ref 0–200)
RBC # BLD AUTO: 3.98 10*6/MM3 (ref 3.77–5.28)
SODIUM SERPL-SCNC: 143 MMOL/L (ref 136–145)
TRIGL SERPL-MCNC: 106 MG/DL (ref 0–150)
WBC NRBC COR # BLD: 3.95 10*3/MM3 (ref 3.4–10.8)

## 2022-05-18 PROCEDURE — 36415 COLL VENOUS BLD VENIPUNCTURE: CPT

## 2022-05-18 PROCEDURE — 84478 ASSAY OF TRIGLYCERIDES: CPT

## 2022-05-18 PROCEDURE — 82465 ASSAY BLD/SERUM CHOLESTEROL: CPT

## 2022-05-18 PROCEDURE — 85025 COMPLETE CBC W/AUTO DIFF WBC: CPT

## 2022-05-18 PROCEDURE — 80197 ASSAY OF TACROLIMUS: CPT

## 2022-05-18 PROCEDURE — 82570 ASSAY OF URINE CREATININE: CPT

## 2022-05-18 PROCEDURE — 87799 DETECT AGENT NOS DNA QUANT: CPT

## 2022-05-18 PROCEDURE — 84156 ASSAY OF PROTEIN URINE: CPT

## 2022-05-18 PROCEDURE — 80053 COMPREHEN METABOLIC PANEL: CPT

## 2022-05-20 LAB — TACROLIMUS BLD LC/MS/MS-MCNC: 4.7 NG/ML (ref 2–20)

## 2022-05-21 LAB — BKV DNA SPEC NAA+PROBE-ACNC: NEGATIVE IU/ML

## 2022-08-11 ENCOUNTER — APPOINTMENT (OUTPATIENT)
Dept: CT IMAGING | Facility: HOSPITAL | Age: 77
End: 2022-08-11

## 2022-08-11 ENCOUNTER — APPOINTMENT (OUTPATIENT)
Dept: GENERAL RADIOLOGY | Facility: HOSPITAL | Age: 77
End: 2022-08-11

## 2022-08-11 ENCOUNTER — HOSPITAL ENCOUNTER (INPATIENT)
Facility: HOSPITAL | Age: 77
LOS: 5 days | Discharge: HOME | End: 2022-08-16
Attending: EMERGENCY MEDICINE | Admitting: INTERNAL MEDICINE

## 2022-08-11 DIAGNOSIS — U07.1 COVID-19 VIRUS DETECTED: ICD-10-CM

## 2022-08-11 DIAGNOSIS — R06.09 DYSPNEA ON EXERTION: Primary | ICD-10-CM

## 2022-08-11 DIAGNOSIS — J90 PLEURAL EFFUSION, BILATERAL: ICD-10-CM

## 2022-08-11 DIAGNOSIS — I21.4 NSTEMI (NON-ST ELEVATED MYOCARDIAL INFARCTION): ICD-10-CM

## 2022-08-11 DIAGNOSIS — R77.8 ELEVATED TROPONIN: ICD-10-CM

## 2022-08-11 PROBLEM — R79.89 ELEVATED TROPONIN: Status: ACTIVE | Noted: 2022-08-11

## 2022-08-11 PROBLEM — J44.9 COPD (CHRONIC OBSTRUCTIVE PULMONARY DISEASE): Status: ACTIVE | Noted: 2022-08-11

## 2022-08-11 LAB
ALBUMIN SERPL-MCNC: 3.5 G/DL (ref 3.5–5.2)
ALBUMIN SERPL-MCNC: 3.9 G/DL (ref 3.5–5.2)
ALBUMIN/GLOB SERPL: 2.1 G/DL
ALP SERPL-CCNC: 47 U/L (ref 39–117)
ALP SERPL-CCNC: 55 U/L (ref 39–117)
ALT SERPL W P-5'-P-CCNC: 33 U/L (ref 1–33)
ALT SERPL W P-5'-P-CCNC: 34 U/L (ref 1–33)
ANION GAP SERPL CALCULATED.3IONS-SCNC: 8 MMOL/L (ref 5–15)
APTT PPP: 28.5 SECONDS (ref 61–76.5)
APTT PPP: 59.5 SECONDS (ref 61–76.5)
AST SERPL-CCNC: 69 U/L (ref 1–32)
AST SERPL-CCNC: 76 U/L (ref 1–32)
BASOPHILS # BLD AUTO: 0 10*3/MM3 (ref 0–0.2)
BASOPHILS NFR BLD AUTO: 0.2 % (ref 0–1.5)
BILIRUB CONJ SERPL-MCNC: 0.4 MG/DL (ref 0–0.3)
BILIRUB INDIRECT SERPL-MCNC: 0.8 MG/DL
BILIRUB SERPL-MCNC: 1.2 MG/DL (ref 0–1.2)
BILIRUB SERPL-MCNC: 1.4 MG/DL (ref 0–1.2)
BUN SERPL-MCNC: 16 MG/DL (ref 8–23)
BUN/CREAT SERPL: 22.9 (ref 7–25)
CALCIUM SPEC-SCNC: 9.3 MG/DL (ref 8.6–10.5)
CHLORIDE SERPL-SCNC: 104 MMOL/L (ref 98–107)
CO2 SERPL-SCNC: 28 MMOL/L (ref 22–29)
CREAT SERPL-MCNC: 0.7 MG/DL (ref 0.57–1)
CREAT SERPL-MCNC: 0.72 MG/DL (ref 0.57–1)
D DIMER PPP FEU-MCNC: 2.32 MG/L (FEU) (ref 0–0.59)
DEPRECATED RDW RBC AUTO: 52.1 FL (ref 37–54)
EGFRCR SERPLBLD CKD-EPI 2021: 87.3 ML/MIN/1.73
EGFRCR SERPLBLD CKD-EPI 2021: 90.3 ML/MIN/1.73
EOSINOPHIL # BLD AUTO: 0 10*3/MM3 (ref 0–0.4)
EOSINOPHIL NFR BLD AUTO: 0 % (ref 0.3–6.2)
ERYTHROCYTE [DISTWIDTH] IN BLOOD BY AUTOMATED COUNT: 15.2 % (ref 12.3–15.4)
GLOBULIN UR ELPH-MCNC: 1.9 GM/DL
GLUCOSE SERPL-MCNC: 103 MG/DL (ref 65–99)
HCT VFR BLD AUTO: 34.9 % (ref 34–46.6)
HGB BLD-MCNC: 11.5 G/DL (ref 12–15.9)
HOLD SPECIMEN: NORMAL
INR PPP: 1.09 (ref 0.93–1.1)
LYMPHOCYTES # BLD AUTO: 0.3 10*3/MM3 (ref 0.7–3.1)
LYMPHOCYTES NFR BLD AUTO: 7.9 % (ref 19.6–45.3)
MCH RBC QN AUTO: 31.7 PG (ref 26.6–33)
MCHC RBC AUTO-ENTMCNC: 32.9 G/DL (ref 31.5–35.7)
MCV RBC AUTO: 96.3 FL (ref 79–97)
MONOCYTES # BLD AUTO: 0.4 10*3/MM3 (ref 0.1–0.9)
MONOCYTES NFR BLD AUTO: 10.3 % (ref 5–12)
NEUTROPHILS NFR BLD AUTO: 2.8 10*3/MM3 (ref 1.7–7)
NEUTROPHILS NFR BLD AUTO: 81.6 % (ref 42.7–76)
NRBC BLD AUTO-RTO: 0.3 /100 WBC (ref 0–0.2)
NT-PROBNP SERPL-MCNC: ABNORMAL PG/ML (ref 0–1800)
PLATELET # BLD AUTO: 94 10*3/MM3 (ref 140–450)
PMV BLD AUTO: 7.3 FL (ref 6–12)
POTASSIUM SERPL-SCNC: 4.4 MMOL/L (ref 3.5–5.2)
PROT SERPL-MCNC: 5.3 G/DL (ref 6–8.5)
PROT SERPL-MCNC: 5.8 G/DL (ref 6–8.5)
PROTHROMBIN TIME: 11.2 SECONDS (ref 9.6–11.7)
RBC # BLD AUTO: 3.62 10*6/MM3 (ref 3.77–5.28)
SARS-COV-2 RNA PNL SPEC NAA+PROBE: DETECTED
SODIUM SERPL-SCNC: 140 MMOL/L (ref 136–145)
TROPONIN T SERPL-MCNC: 0.49 NG/ML (ref 0–0.03)
WBC NRBC COR # BLD: 3.4 10*3/MM3 (ref 3.4–10.8)

## 2022-08-11 PROCEDURE — 99223 1ST HOSP IP/OBS HIGH 75: CPT | Performed by: INTERNAL MEDICINE

## 2022-08-11 PROCEDURE — 80053 COMPREHEN METABOLIC PANEL: CPT | Performed by: NURSE PRACTITIONER

## 2022-08-11 PROCEDURE — 85610 PROTHROMBIN TIME: CPT | Performed by: NURSE PRACTITIONER

## 2022-08-11 PROCEDURE — 25010000002 REMDESIVIR 100 MG RECONSTITUTED SOLUTION: Performed by: NURSE PRACTITIONER

## 2022-08-11 PROCEDURE — 84484 ASSAY OF TROPONIN QUANT: CPT | Performed by: NURSE PRACTITIONER

## 2022-08-11 PROCEDURE — 25010000002 HEPARIN (PORCINE) 25000-0.45 UT/250ML-% SOLUTION: Performed by: NURSE PRACTITIONER

## 2022-08-11 PROCEDURE — 25010000002 ONDANSETRON PER 1 MG: Performed by: NURSE PRACTITIONER

## 2022-08-11 PROCEDURE — 85730 THROMBOPLASTIN TIME PARTIAL: CPT | Performed by: NURSE PRACTITIONER

## 2022-08-11 PROCEDURE — 71045 X-RAY EXAM CHEST 1 VIEW: CPT

## 2022-08-11 PROCEDURE — 71275 CT ANGIOGRAPHY CHEST: CPT

## 2022-08-11 PROCEDURE — 36415 COLL VENOUS BLD VENIPUNCTURE: CPT | Performed by: NURSE PRACTITIONER

## 2022-08-11 PROCEDURE — 99284 EMERGENCY DEPT VISIT MOD MDM: CPT

## 2022-08-11 PROCEDURE — 93005 ELECTROCARDIOGRAM TRACING: CPT

## 2022-08-11 PROCEDURE — 82248 BILIRUBIN DIRECT: CPT | Performed by: NURSE PRACTITIONER

## 2022-08-11 PROCEDURE — 82565 ASSAY OF CREATININE: CPT | Performed by: NURSE PRACTITIONER

## 2022-08-11 PROCEDURE — 85379 FIBRIN DEGRADATION QUANT: CPT | Performed by: NURSE PRACTITIONER

## 2022-08-11 PROCEDURE — 85025 COMPLETE CBC W/AUTO DIFF WBC: CPT | Performed by: NURSE PRACTITIONER

## 2022-08-11 PROCEDURE — 83880 ASSAY OF NATRIURETIC PEPTIDE: CPT | Performed by: NURSE PRACTITIONER

## 2022-08-11 PROCEDURE — 93005 ELECTROCARDIOGRAM TRACING: CPT | Performed by: EMERGENCY MEDICINE

## 2022-08-11 PROCEDURE — 99223 1ST HOSP IP/OBS HIGH 75: CPT | Performed by: NURSE PRACTITIONER

## 2022-08-11 PROCEDURE — 25010000002 FUROSEMIDE PER 20 MG: Performed by: NURSE PRACTITIONER

## 2022-08-11 PROCEDURE — 87635 SARS-COV-2 COVID-19 AMP PRB: CPT | Performed by: NURSE PRACTITIONER

## 2022-08-11 RX ORDER — ACETAMINOPHEN 325 MG/1
650 TABLET ORAL EVERY 4 HOURS PRN
Status: DISCONTINUED | OUTPATIENT
Start: 2022-08-11 | End: 2022-08-16 | Stop reason: HOSPADM

## 2022-08-11 RX ORDER — SODIUM CHLORIDE 9 MG/ML
100 INJECTION, SOLUTION INTRAVENOUS CONTINUOUS
Status: DISCONTINUED | OUTPATIENT
Start: 2022-08-11 | End: 2022-08-12

## 2022-08-11 RX ORDER — SODIUM CHLORIDE 0.9 % (FLUSH) 0.9 %
10 SYRINGE (ML) INJECTION AS NEEDED
Status: DISCONTINUED | OUTPATIENT
Start: 2022-08-11 | End: 2022-08-16 | Stop reason: HOSPADM

## 2022-08-11 RX ORDER — ACETAMINOPHEN 500 MG
1000 TABLET ORAL ONCE
Status: COMPLETED | OUTPATIENT
Start: 2022-08-11 | End: 2022-08-11

## 2022-08-11 RX ORDER — ONDANSETRON 2 MG/ML
4 INJECTION INTRAMUSCULAR; INTRAVENOUS EVERY 6 HOURS PRN
Status: DISCONTINUED | OUTPATIENT
Start: 2022-08-11 | End: 2022-08-16 | Stop reason: HOSPADM

## 2022-08-11 RX ORDER — ONDANSETRON 4 MG/1
4 TABLET, FILM COATED ORAL EVERY 6 HOURS PRN
Status: DISCONTINUED | OUTPATIENT
Start: 2022-08-11 | End: 2022-08-16 | Stop reason: HOSPADM

## 2022-08-11 RX ORDER — ACETAMINOPHEN 160 MG/5ML
650 SOLUTION ORAL EVERY 4 HOURS PRN
Status: DISCONTINUED | OUTPATIENT
Start: 2022-08-11 | End: 2022-08-16 | Stop reason: HOSPADM

## 2022-08-11 RX ORDER — ACETAMINOPHEN 650 MG/1
650 SUPPOSITORY RECTAL EVERY 4 HOURS PRN
Status: DISCONTINUED | OUTPATIENT
Start: 2022-08-11 | End: 2022-08-16 | Stop reason: HOSPADM

## 2022-08-11 RX ORDER — FUROSEMIDE 10 MG/ML
80 INJECTION INTRAMUSCULAR; INTRAVENOUS ONCE
Status: COMPLETED | OUTPATIENT
Start: 2022-08-11 | End: 2022-08-11

## 2022-08-11 RX ORDER — ASPIRIN 325 MG
325 TABLET ORAL ONCE
Status: COMPLETED | OUTPATIENT
Start: 2022-08-11 | End: 2022-08-11

## 2022-08-11 RX ORDER — SODIUM CHLORIDE 0.9 % (FLUSH) 0.9 %
10 SYRINGE (ML) INJECTION EVERY 12 HOURS SCHEDULED
Status: DISCONTINUED | OUTPATIENT
Start: 2022-08-11 | End: 2022-08-16 | Stop reason: HOSPADM

## 2022-08-11 RX ORDER — HEPARIN SODIUM 10000 [USP'U]/100ML
12 INJECTION, SOLUTION INTRAVENOUS
Status: DISCONTINUED | OUTPATIENT
Start: 2022-08-11 | End: 2022-08-13

## 2022-08-11 RX ADMIN — HEPARIN SODIUM 12 UNITS/KG/HR: 10000 INJECTION, SOLUTION INTRAVENOUS at 18:50

## 2022-08-11 RX ADMIN — ONDANSETRON 4 MG: 2 INJECTION INTRAMUSCULAR; INTRAVENOUS at 19:43

## 2022-08-11 RX ADMIN — ACETAMINOPHEN 1000 MG: 500 TABLET ORAL at 17:56

## 2022-08-11 RX ADMIN — REMDESIVIR 200 MG: 100 INJECTION, POWDER, LYOPHILIZED, FOR SOLUTION INTRAVENOUS at 20:30

## 2022-08-11 RX ADMIN — SODIUM CHLORIDE 100 ML/HR: 9 INJECTION, SOLUTION INTRAVENOUS at 20:30

## 2022-08-11 RX ADMIN — Medication 10 ML: at 21:53

## 2022-08-11 RX ADMIN — FUROSEMIDE 80 MG: 10 INJECTION, SOLUTION INTRAMUSCULAR; INTRAVENOUS at 17:20

## 2022-08-11 RX ADMIN — ASPIRIN 325 MG ORAL TABLET 325 MG: 325 PILL ORAL at 17:55

## 2022-08-11 RX ADMIN — HEPARIN SODIUM 12 UNITS/KG/HR: 10000 INJECTION, SOLUTION INTRAVENOUS at 18:44

## 2022-08-11 NOTE — H&P
[facility] Medicine Services      Patient Name: Jaja Carcamo  : 1945  MRN: [id number]  Primary Care Physician:  Gary Bunch MD  Date of admission: 2022      Subjective      Chief Complaint: Shortness of breath worsening over the last 2 days    History of Present Illness: Jaja Carcamo is a 77 y.o. female who presented to [facility] on 2022 complaining of shortness of breath which has been worsening over the last week but most significantly over the last 2 days.  The patient reports an episode of chest pain yesterday which was fleeting in nature near the midsternal and left side of the chest.  She otherwise denies chest pain, nausea or diaphoresis.  The patient has noted increased shortness of breath especially with exertion starting last week but significantly worsening over the last 2 days.  The patient has chronic oxygen at home 3 L per nasal cannula.  Despite this oxygen support the patient reports she has not been able to catch her breath.  In the emergency room the patient was noted to present with oxygen saturation of 84%. The patient has chronic lower extremity edema which waxes and wanes and was particularly bad 2 days ago.  This improved when she elevated her legs on pillows.  The patient reports that the right is chronically more swollen than the left.  The patient denies increased calf pain.  Patient has a history of lung cancer with resection of the right lower lung without chemotherapy or radiation diagnosed approximately 2016.  Patient is status post renal transplant around 2017.     Review of records with summary: Patient was seen at [facility] on 2022 for worsening shortness of breath.  She had CT chest without contrast showing CT chest without showing significant emphysema, postoperative scarring of the right lower chest.  No suspicious pulmonary nodule.  There is small bilateral pleural effusion.  Coronary artery  calcification.  Echo stasis of the ascending thoracic aorta 3.9 cm.    Patient was seen 8/1/2022 for lightheadedness with lateral carotid ultrasound.    The patient fell in March 2022 and sustained a wrist fracture.     Review of Systems   Constitutional: Negative for chills and fever.   Cardiovascular: Positive for chest pain and dyspnea on exertion.   Respiratory: Positive for shortness of breath. Negative for cough.    Gastrointestinal: Negative for nausea and vomiting.   Genitourinary: Negative for dysuria.   All other systems reviewed and are negative.      Personal History     Past Medical History:   Diagnosis Date   • Cancer (HCC)     lung   • COPD (chronic obstructive pulmonary disease) (HCC)    • History of appendectomy    • Hypertension    • Renal disease    • Hartman syndrome        Past Surgical History:   Procedure Laterality Date   • BREAST SURGERY Left     cysts rmeoved   • CLOSED REDUCTION WRIST FRACTURE Right 3/1/2022    Procedure: WRIST CLOSED REDUCTION;  Surgeon: Gaudencio Townsend MD;  Location: [facility] OR;  Service: Orthopedics;  Laterality: Right;   • CYSTOSCOPY     • HYSTERECTOMY     • LUNG LOBECTOMY Right    • TRANSPLANTATION RENAL         Family History: family history includes No Known Problems in her father and mother. Otherwise pertinent FHx was reviewed and not pertinent to current issue.    Social History:  reports that she has quit smoking. She has never used smokeless tobacco. She reports that she does not drink alcohol and does not use drugs.    Home Medications:  Prior to Admission Medications     Prescriptions Last Dose Informant Patient Reported? Taking?    azaTHIOprine (IMURAN) 50 MG tablet  Spouse/Significant Other Yes No    Take 50 mg by mouth 2 (Two) Times a Day.    diazePAM (VALIUM) 5 MG tablet  Spouse/Significant Other Yes No    Take 5 mg by mouth 2 (Two) Times a Day As Needed for Anxiety.    furosemide (LASIX) 40 MG tablet  Spouse/Significant Other Yes No    Take 40 mg by mouth  Daily.    levothyroxine (SYNTHROID, LEVOTHROID) 50 MCG tablet  Spouse/Significant Other Yes No    Take 50 mcg by mouth Daily.    metoprolol tartrate (LOPRESSOR) 50 MG tablet   Yes No    Take 50 mg by mouth Daily.    potassium chloride (KLOR-CON) 20 MEQ packet   Yes No    Take 20 mEq by mouth Daily.    predniSONE (DELTASONE) 5 MG tablet  Spouse/Significant Other Yes No    Take 5 mg by mouth Daily.    simvastatin (ZOCOR) 20 MG tablet  Spouse/Significant Other Yes No    Take 20 mg by mouth Every Night.    tacrolimus (PROGRAF) 1 MG capsule  Spouse/Significant Other Yes No    Take 1 mg by mouth 2 (Two) Times a Day.            Allergies:  Allergies   Allergen Reactions   • Zolpidem Other (See Comments), Unknown (See Comments) and Unknown - High Severity     KEPT HER AWAKE  KEPT HER AWAKE  KEPT HER AWAKE  KEPT HER AWAKE         Objective      Vitals:   Temp:  [98 °F (36.7 °C)] 98 °F (36.7 °C)  Heart Rate:  [60-73] 65  Resp:  [18] 18  BP: ()/(54-77) 133/55  Flow (L/min):  [3] 3    Physical Exam  Vitals and nursing note reviewed.   Constitutional:       General: She is not in acute distress.     Appearance: Normal appearance. She is not ill-appearing or toxic-appearing.   HENT:      Head: Normocephalic and atraumatic.      Right Ear: External ear normal.      Left Ear: External ear normal.      Nose: Nose normal.      Mouth/Throat:      Mouth: Mucous membranes are moist.   Eyes:      General: No scleral icterus.        Right eye: No discharge.         Left eye: No discharge.      Extraocular Movements: Extraocular movements intact.      Conjunctiva/sclera: Conjunctivae normal.      Pupils: Pupils are equal, round, and reactive to light.   Cardiovascular:      Rate and Rhythm: Normal rate and regular rhythm.      Pulses: Normal pulses.      Heart sounds: Normal heart sounds. No murmur heard.  Pulmonary:      Effort: Pulmonary effort is normal. No respiratory distress.      Breath sounds: Normal breath sounds. No  stridor. No wheezing, rhonchi or rales.   Chest:      Chest wall: No tenderness.   Abdominal:      General: Bowel sounds are normal. There is no distension.      Palpations: Abdomen is soft.      Tenderness: There is no abdominal tenderness.   Musculoskeletal:         General: Normal range of motion.      Cervical back: Normal range of motion and neck supple.      Right lower leg: Edema present.      Left lower leg: Edema present.   Skin:     General: Skin is warm and dry.      Capillary Refill: Capillary refill takes less than 2 seconds.   Neurological:      General: No focal deficit present.      Mental Status: She is alert and oriented to person, place, and time.   Psychiatric:         Mood and Affect: Mood normal.         Behavior: Behavior normal.         Thought Content: Thought content normal.         Judgment: Judgment normal.       Result Review    Result Review:  I have personally reviewed the results from the time of this admission to 8/11/2022 20:05 EDT and agree with these findings:  [x]  Laboratory  [x]  Microbiology  [x]  Radiology  [x]  EKG/Telemetry   [x]  Cardiology/Vascular   []  Pathology  [x]  Old records  []  Other:  Most notable findings include: Elevated troponin; elevated D-dimer; EKG heart rate 108      Assessment & Plan        Active Hospital Problems:  Active Hospital Problems    Diagnosis    • **Dyspnea on exertion    • Elevated troponin    • COVID-19 virus detected    • COPD (chronic obstructive pulmonary disease) ([facility])      Plan:   Dyspnea on exertion, uncertain etiology--consideration for non-STEMI; consideration for pulmonary embolism; consideration for COVID-19; consideration for volume overload with proBNP 14,000; consideration for exacerbation COPD, less likely without increased wheezing, cough, or sputum production: Cardiology consult; serial troponin; CT chest PE protocol; heparin drip; echocardiogram  -D-dimer elevated  -Patient is a high risk for both pulmonary embolism and  non-STEMI  -Elevated troponin at 0.4  -EKG without acute ST changes; presentation heart rate 108  -Oxygen saturation on presentation 84% with supplemental oxygen  -Echocardiogram dated 10/14/2020 showing EF approximately 70% with grade 1 diastolic dysfunction and mild mitral valve regurgitation  -Chest x-ray showing mild cardiomegaly, features of interstitial edema, small bilateral pleural effusions which were also seen on CT chest 8/8/2022 at another facility  -Carotid ultrasound 8/1/2022 for lightheadedness     Antirejection therapy with history of renal transplant around 2017: Nephrology consult; continue prednisone; continue Prograf; continue imuran; hold Lasix; gentle IV fluids  -Patient may require IV contrast for CT PE protocol and/or cardiac catheterization  -Renal function is good on presentation    COVID-19 with increased oxygen demand: Consult pharmacy for remdesivir    HLD, chronic: Continue Zocor    One time dose of medications order as the patients home medications have not been verified at time of signing of this note. Please verify home medications.       DVT prophylaxis:  Medical DVT prophylaxis orders are present.    CODE STATUS:    Code Status (Patient has no pulse and is not breathing): CPR (Attempt to Resuscitate)  Medical Interventions (Patient has pulse or is breathing): Full Support    Admission Status:  I believe this patient meets inpatient status.    I discussed the patient's findings and my recommendations with patient and nursing staff.    This patient has been examined wearing appropriate Personal Protective Equipment. 08/11/22      Signature: Electronically signed by TONYA Snowden, 08/11/22, 8:55 PM EDT.

## 2022-08-11 NOTE — ED PROVIDER NOTES
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  Patient reports that while she is on 3 L of oxygen all the time at home she feels as though he is still unable to catch her breath for the last several days.  Reports that she only has enough energy to go from the bed to the bathroom.  Her shortness of breath is worse with exertion.  States that 2 days ago she went to the emergency room at [facility] where they diagnosed her with \"fluid on her lungs\" and sent her home although she felt unsafe to do so.  Complains of decreased appetite, headaches, and nausea for the past few days.  Reports bilateral lower extremity swelling which she states is slightly improved after she elevated her legs last night.  She denies chest pain, fever, chills, or diarrhea.          Review of Systems   Constitutional: Positive for appetite change. Negative for chills and fever.   Eyes: Negative.    Respiratory: Positive for shortness of breath.    Cardiovascular: Positive for leg swelling. Negative for chest pain.   Gastrointestinal: Positive for nausea and vomiting. Negative for abdominal pain and diarrhea.   Endocrine: Negative.    Genitourinary: Negative.    Musculoskeletal: Negative.    Skin: Negative.    Allergic/Immunologic: Negative.    Neurological: Positive for weakness and headaches.   Hematological: Negative.    Psychiatric/Behavioral: Negative.        Past Medical History:   Diagnosis Date   • Cancer (HCC)     lung   • History of appendectomy    • Hypertension    • Renal disease    • Hartman syndrome        Allergies   Allergen Reactions   • Zolpidem Other (See Comments), Unknown (See Comments) and Unknown - High Severity     KEPT HER AWAKE  KEPT HER AWAKE  KEPT HER AWAKE  KEPT HER AWAKE         Past Surgical History:   Procedure Laterality Date   • BREAST SURGERY Left     cysts rmeoved   • " CLOSED REDUCTION WRIST FRACTURE Right 3/1/2022    Procedure: WRIST CLOSED REDUCTION;  Surgeon: Gaudencio Townsend MD;  Location: [facility] MAIN OR;  Service: Orthopedics;  Laterality: Right;   • CYSTOSCOPY     • HYSTERECTOMY     • LUNG LOBECTOMY Right    • TRANSPLANTATION RENAL         Family History   Problem Relation Age of Onset   • No Known Problems Mother    • No Known Problems Father        Social History     Socioeconomic History   • Marital status:    Tobacco Use   • Smoking status: Former Smoker   • Smokeless tobacco: Never Used   Vaping Use   • Vaping Use: Former   Substance and Sexual Activity   • Alcohol use: Never   • Drug use: Never   • Sexual activity: Defer           Objective   Physical Exam  Vital signs and triage nurse note reviewed.  Constitutional: Awake, alert; well-developed and well-nourished. No acute distress is noted.  HEENT: Normocephalic, atraumatic; pupils are PERRL with intact EOM; oropharynx is pink and moist without exudate or erythema.  No drooling or pooling of oral secretions.  Neck: Supple, full range of motion without pain; no cervical lymphadenopathy. Normal phonation.  Cardiovascular: Irregular rate and rhythm, normal S1-S2.  No murmur noted.  Pulmonary: Respiratory effort regular nonlabored, breath sounds crackles were bilaterally.  Abdomen: Soft, nontender, nondistended with normoactive bowel sounds; no rebound or guarding.  Musculoskeletal: Independent range of motion of all extremities with no palpable tenderness.  1+ pitting edema noted to both lower extremities.  There is no erythema.  No calf tenderness.  Negative Isela.  Neuro: Alert oriented x3, speech is clear and appropriate, GCS 15.    Skin: Flesh tone, warm, dry, intact; no erythematous or petechial rash or lesion.          Procedures           ED Course      Labs Reviewed   COVID-19,CEPHEID/JACK,COR/TWILA/PAD/DEVAN IN-HOUSE,NP SWAB IN TRANSPORT MEDIA 3-4 HR TAT, RT-PCR - Abnormal; Notable for the following components:        Result Value    COVID19 Detected (*)     All other components within normal limits    Narrative:     Fact sheet for providers: https://www.fda.gov/media/677668/download     Fact sheet for patients: https://www.fda.gov/media/865995/download  Fact sheet for providers: https://www.fda.gov/media/092305/download    Fact sheet for patients: https://www.fda.gov/media/887572/download    Test performed by PCR.   COMPREHENSIVE METABOLIC PANEL - Abnormal; Notable for the following components:    Glucose 103 (*)     Total Protein 5.8 (*)     ALT (SGPT) 34 (*)     AST (SGOT) 76 (*)     Total Bilirubin 1.4 (*)     All other components within normal limits    Narrative:     GFR Normal >60  Chronic Kidney Disease <60  Kidney Failure <15     BNP (IN-HOUSE) - Abnormal; Notable for the following components:    proBNP 14,786.0 (*)     All other components within normal limits    Narrative:     Among patients with dyspnea, NT-proBNP is highly sensitive for the detection of acute congestive heart failure. In addition NT-proBNP of <300 pg/ml effectively rules out acute congestive heart failure with 99% negative predictive value.    Results may be falsely decreased if patient taking Biotin.     TROPONIN (IN-HOUSE) - Abnormal; Notable for the following components:    Troponin T 0.486 (*)     All other components within normal limits    Narrative:     Troponin T Reference Range:  <= 0.03 ng/mL-   Negative for AMI  >0.03 ng/mL-     Abnormal for myocardial necrosis.  Clinicians would have to utilize clinical acumen, EKG, Troponin and serial changes to determine if it is an Acute Myocardial Infarction or myocardial injury due to an underlying chronic condition.       Results may be falsely decreased if patient taking Biotin.     CBC WITH AUTO DIFFERENTIAL - Abnormal; Notable for the following components:    RBC 3.62 (*)     Hemoglobin 11.5 (*)     Platelets 94 (*)     Neutrophil % 81.6 (*)     Lymphocyte % 7.9 (*)     Eosinophil % 0.0 (*)      Lymphocytes, Absolute 0.30 (*)     nRBC 0.3 (*)     All other components within normal limits   APTT - Abnormal; Notable for the following components:    PTT 28.5 (*)     All other components within normal limits   PROTIME-INR - Normal   CBC AND DIFFERENTIAL    Narrative:     The following orders were created for panel order CBC & Differential.  Procedure                               Abnormality         Status                     ---------                               -----------         ------                     CBC Auto Differential[[id number]]        Abnormal            Final result                 Please view results for these tests on the individual orders.   EXTRA TUBES    Narrative:     The following orders were created for panel order Extra Tubes.  Procedure                               Abnormality         Status                     ---------                               -----------         ------                     Gold Top - SST[[id number]]                                   Final result                 Please view results for these tests on the individual orders.   GOLD TOP - SST     XR Chest 1 View    Result Date: 8/11/2022  Moderate cardiomegaly with features of interstitial edema and small bilateral pleural effusions. Probable mild left basilar atelectasis.  Electronically Signed By-Karie Bustos MD On:8/11/2022 4:28 PM This report was finalized on [id number] by  Karie Bustos MD.    Medications   sodium chloride 0.9 % flush 10 mL (has no administration in time range)   heparin bolus from bag 4,900 Units (has no administration in time range)   heparin 29718 units/250 mL (100 units/mL) in 0.45 % NaCl infusion (has no administration in time range)   heparin bolus from bag 2,500 Units (has no administration in time range)   heparin bolus from bag 4,900 Units (has no administration in time range)   furosemide (LASIX) injection 80 mg (80 mg Intravenous Given 8/11/22 1720)   aspirin tablet 325  mg (325 mg Oral Given 8/11/22 1755)   acetaminophen (TYLENOL) tablet 1,000 mg (1,000 mg Oral Given 8/11/22 1756)                                          MDM  Number of Diagnoses or Management Options  Dyspnea on exertion  NSTEMI (non-ST elevated myocardial infarction) (HCC)  Pleural effusion, bilateral  Diagnosis management comments: Comorbidities: Lung cancer/COPD on chronic O2, prior kidney transplant, hypertension  Differentials: CHF exacerbation, pleural effusions, pneumonia, cardiac ischemia, ACS;this list is not all inclusive and does not constitute the entirety of considered causes  Discussion with provider: Andrew  Radiology interpretation: X-rays reviewed by me and interpreted by radiologist: As above  Lab interpretation: Labs viewed by me significant for: As above    Patient is placed on continuous cardiac monitor.  She had labs, EKG and chest x-ray obtained.  She was given a dose of Lasix.    Work-up: EKG reviewed by me and interpreted by Dr. Campoverde shows sinus tachycardia with ventricular rate of 108, prolonged QT interval, no acute ST or T wave changes.  CBC reveals a hemoglobin of 11.5, platelets 94.  Metabolic and is grossly unremarkable.  Troponin 0.486.  proBNP 14,000.  COVID-positive.  Chest x-ray shows Moderate cardiomegaly with features of interstitial edema and small  bilateral pleural effusions. Probable mild left basilar atelectasis.    Patient was given an aspirin.  She is also started on IV heparin.    Reexamination she is resting comfortably and in no distress.  She is hemodynamically stable.  O2 saturation is stable on 3 L.  She is afebrile.    She was discussed with cardiologist on-call Dr. Gavin, who would like the patient to remain n.p.o. with plans for cardiac cath tomorrow.  Patient was also discussed with hospitalist nurse practitioner and will be admitted to the hospital for further management.       Amount and/or Complexity of Data Reviewed  Clinical lab tests: reviewed and  ordered  Tests in the radiology section of CPT®: reviewed and ordered  Tests in the medicine section of CPT®: reviewed  Decide to obtain previous medical records or to obtain history from someone other than the patient: yes    Patient Progress  Patient progress: stable      Final diagnoses:   Dyspnea on exertion   NSTEMI (non-ST elevated myocardial infarction) (HCC)   Pleural effusion, bilateral       ED Disposition  ED Disposition     ED Disposition   Decision to Admit    Condition   --    Comment   Level of Care: Progressive Care [20]   Admitting Physician: MICHAEL MANE [[id number]]   Attending Physician: MICHAEL MANE [[id number]]               No follow-up provider specified.       Medication List      No changes were made to your prescriptions during this visit.          Karina Marin, TONYA  08/11/22 0758

## 2022-08-11 NOTE — CASE MANAGEMENT/SOCIAL WORK
Discharge Planning Assessment   [facility] [place]     Patient Name: Jaja Carcamo  MRN: [id number]  Today's Date: 8/11/2022    Admit Date: 8/11/2022     Discharge Needs Assessment     Row Name 08/11/22 1900       Living Environment    People in Home alone    Current Living Arrangements home    Primary Care Provided by self    Provides Primary Care For no one    Family Caregiver if Needed child(katarzyna), adult    Family Caregiver Names Pio Guerin    Quality of Family Relationships supportive;involved    Able to Return to Prior Arrangements yes       Resource/Environmental Concerns    Resource/Environmental Concerns none    Transportation Concerns none       Transition Planning    Patient/Family Anticipates Transition to home;inpatient rehabilitation facility  pending clinical course    Patient/Family Anticipated Services at Transition --  Pending clinical [facility]    Transportation Anticipated family or friend will provide       Discharge Needs Assessment    Readmission Within the Last 30 Days no previous admission in last 30 days    Equipment Currently Used at Home cane, straight    Concerns to be Addressed discharge planning    Anticipated Changes Related to Illness none    Equipment Needed After Discharge none    Discharge Facility/Level of Care Needs --  Pending clinical [facility]               Discharge Plan     Row Name 08/11/22 3602       Plan    Plan Covid + From Home alone, Home Health VS SNF pending CLinical course, Home oxygen with [place] , Heart Cath planned 8/12    Patient/Family in Agreement with Plan yes    Plan Comments Called patient. Lives at home Alone, IADL's but has been very weak at home. Son will provide transportation at discharge. PCP and Pharmacy vrerified, able to afford medications. Spoke about discharge planning and patient unsure if she wants home health or Rehab. She will think about it and see how she feels tomorrow after Heart cath. d/c barriers: Cardiology Following Planned heart cath 8/12                   Expected Discharge Date and Time     Expected Discharge Date Expected Discharge Time    Aug 13, 2022          Demographic Summary     Row Name 08/11/22 1859       General Information    Admission Type --  Pending Status order    Arrived From emergency department    Referral Source admission list    Reason for Consult discharge planning    Preferred Language English               Functional Status     Row Name 08/11/22 1900       Functional Status    Usual Activity Tolerance good    Current Activity Tolerance fair       Functional Status, IADL    Medications independent    Meal Preparation independent    Housekeeping independent    Laundry independent    Shopping independent       Mental Status    General Appearance WDL WDL       Mental Status Summary    Recent Changes in Mental Status/Cognitive Functioning no changes              Phone communication or documentation only - no physical contact with patient or family.              Ida Chris, RN

## 2022-08-12 ENCOUNTER — APPOINTMENT (OUTPATIENT)
Dept: CARDIOLOGY | Facility: HOSPITAL | Age: 77
End: 2022-08-12

## 2022-08-12 PROBLEM — I21.4 NSTEMI (NON-ST ELEVATED MYOCARDIAL INFARCTION) (HCC): Status: ACTIVE | Noted: 2022-08-11

## 2022-08-12 LAB
ALBUMIN SERPL-MCNC: 3.8 G/DL (ref 3.5–5.2)
ALP SERPL-CCNC: 53 U/L (ref 39–117)
ALT SERPL W P-5'-P-CCNC: 40 U/L (ref 1–33)
APTT PPP: 112.1 SECONDS (ref 61–76.5)
APTT PPP: 50.5 SECONDS (ref 61–76.5)
AST SERPL-CCNC: 74 U/L (ref 1–32)
BH CV ECHO MEAS - EF(MOD-BP): 55 %
BILIRUB CONJ SERPL-MCNC: 0.4 MG/DL (ref 0–0.3)
BILIRUB INDIRECT SERPL-MCNC: 0.8 MG/DL
BILIRUB SERPL-MCNC: 1.2 MG/DL (ref 0–1.2)
CREAT SERPL-MCNC: 0.69 MG/DL (ref 0.57–1)
EGFRCR SERPLBLD CKD-EPI 2021: 90.6 ML/MIN/1.73
MAXIMAL PREDICTED HEART RATE: 145 BPM
PROT SERPL-MCNC: 6 G/DL (ref 6–8.5)
QT INTERVAL: 384 MS
STRESS TARGET HR: 123 BPM
TROPONIN T SERPL-MCNC: 0.42 NG/ML (ref 0–0.03)

## 2022-08-12 PROCEDURE — 80076 HEPATIC FUNCTION PANEL: CPT | Performed by: HOSPITALIST

## 2022-08-12 PROCEDURE — XW033E5 INTRODUCTION OF REMDESIVIR ANTI-INFECTIVE INTO PERIPHERAL VEIN, PERCUTANEOUS APPROACH, NEW TECHNOLOGY GROUP 5: ICD-10-PCS | Performed by: HOSPITALIST

## 2022-08-12 PROCEDURE — 4A023N7 MEASUREMENT OF CARDIAC SAMPLING AND PRESSURE, LEFT HEART, PERCUTANEOUS APPROACH: ICD-10-PCS | Performed by: INTERNAL MEDICINE

## 2022-08-12 PROCEDURE — C1894 INTRO/SHEATH, NON-LASER: HCPCS | Performed by: INTERNAL MEDICINE

## 2022-08-12 PROCEDURE — 99233 SBSQ HOSP IP/OBS HIGH 50: CPT | Performed by: INTERNAL MEDICINE

## 2022-08-12 PROCEDURE — 25010000002 REMDESIVIR 100 MG/20ML SOLUTION 1 EACH VIAL: Performed by: INTERNAL MEDICINE

## 2022-08-12 PROCEDURE — 25010000002 MIDAZOLAM PER 1 MG: Performed by: INTERNAL MEDICINE

## 2022-08-12 PROCEDURE — 0 IOPAMIDOL PER 1 ML: Performed by: INTERNAL MEDICINE

## 2022-08-12 PROCEDURE — 99232 SBSQ HOSP IP/OBS MODERATE 35: CPT | Performed by: HOSPITALIST

## 2022-08-12 PROCEDURE — 25010000002 FENTANYL CITRATE (PF) 100 MCG/2ML SOLUTION: Performed by: INTERNAL MEDICINE

## 2022-08-12 PROCEDURE — 25010000002 ONDANSETRON PER 1 MG: Performed by: NURSE PRACTITIONER

## 2022-08-12 PROCEDURE — 63710000001 TACROLIMUS PER 1 MG: Performed by: HOSPITALIST

## 2022-08-12 PROCEDURE — 63710000001 AZATHIOPRINE PER 50 MG: Performed by: NURSE PRACTITIONER

## 2022-08-12 PROCEDURE — 93458 L HRT ARTERY/VENTRICLE ANGIO: CPT | Performed by: INTERNAL MEDICINE

## 2022-08-12 PROCEDURE — 93325 DOPPLER ECHO COLOR FLOW MAPG: CPT | Performed by: INTERNAL MEDICINE

## 2022-08-12 PROCEDURE — 63710000001 AZATHIOPRINE PER 50 MG: Performed by: INTERNAL MEDICINE

## 2022-08-12 PROCEDURE — 63710000001 TACROLIMUS PER 1 MG: Performed by: INTERNAL MEDICINE

## 2022-08-12 PROCEDURE — 63710000001 PREDNISONE PER 5 MG: Performed by: HOSPITALIST

## 2022-08-12 PROCEDURE — 93325 DOPPLER ECHO COLOR FLOW MAPG: CPT

## 2022-08-12 PROCEDURE — 0 IOPAMIDOL PER 1 ML: Performed by: HOSPITALIST

## 2022-08-12 PROCEDURE — 63710000001 TACROLIMUS PER 1 MG: Performed by: NURSE PRACTITIONER

## 2022-08-12 PROCEDURE — 99152 MOD SED SAME PHYS/QHP 5/>YRS: CPT | Performed by: INTERNAL MEDICINE

## 2022-08-12 PROCEDURE — 63710000001 PREDNISONE PER 5 MG: Performed by: NURSE PRACTITIONER

## 2022-08-12 PROCEDURE — 82565 ASSAY OF CREATININE: CPT | Performed by: HOSPITALIST

## 2022-08-12 PROCEDURE — C1769 GUIDE WIRE: HCPCS | Performed by: INTERNAL MEDICINE

## 2022-08-12 PROCEDURE — 85730 THROMBOPLASTIN TIME PARTIAL: CPT | Performed by: HOSPITALIST

## 2022-08-12 PROCEDURE — C1760 CLOSURE DEV, VASC: HCPCS | Performed by: INTERNAL MEDICINE

## 2022-08-12 PROCEDURE — B2111ZZ FLUOROSCOPY OF MULTIPLE CORONARY ARTERIES USING LOW OSMOLAR CONTRAST: ICD-10-PCS | Performed by: INTERNAL MEDICINE

## 2022-08-12 PROCEDURE — 63710000001 AZATHIOPRINE PER 50 MG: Performed by: HOSPITALIST

## 2022-08-12 PROCEDURE — 93308 TTE F-UP OR LMTD: CPT | Performed by: INTERNAL MEDICINE

## 2022-08-12 PROCEDURE — 93308 TTE F-UP OR LMTD: CPT

## 2022-08-12 RX ORDER — POTASSIUM CHLORIDE 20 MEQ/1
20 TABLET, EXTENDED RELEASE ORAL 2 TIMES DAILY
COMMUNITY
Start: 2021-05-31

## 2022-08-12 RX ORDER — AZATHIOPRINE 50 MG/1
50 TABLET ORAL 2 TIMES DAILY
Status: DISCONTINUED | OUTPATIENT
Start: 2022-08-12 | End: 2022-08-16 | Stop reason: HOSPADM

## 2022-08-12 RX ORDER — SODIUM CHLORIDE 9 MG/ML
INJECTION, SOLUTION INTRAVENOUS CONTINUOUS PRN
Status: COMPLETED | OUTPATIENT
Start: 2022-08-12 | End: 2022-08-12

## 2022-08-12 RX ORDER — ACETAMINOPHEN 325 MG/1
650 TABLET ORAL EVERY 4 HOURS PRN
Status: DISCONTINUED | OUTPATIENT
Start: 2022-08-12 | End: 2022-08-14

## 2022-08-12 RX ORDER — DULOXETIN HYDROCHLORIDE 20 MG/1
40 CAPSULE, DELAYED RELEASE ORAL DAILY
Status: DISCONTINUED | OUTPATIENT
Start: 2022-08-12 | End: 2022-08-16 | Stop reason: HOSPADM

## 2022-08-12 RX ORDER — ATORVASTATIN CALCIUM 10 MG/1
10 TABLET, FILM COATED ORAL DAILY
Status: DISCONTINUED | OUTPATIENT
Start: 2022-08-12 | End: 2022-08-16 | Stop reason: HOSPADM

## 2022-08-12 RX ORDER — METOPROLOL SUCCINATE 50 MG/1
50 TABLET, EXTENDED RELEASE ORAL DAILY
COMMUNITY

## 2022-08-12 RX ORDER — ALBUTEROL SULFATE 90 UG/1
2 AEROSOL, METERED RESPIRATORY (INHALATION) EVERY 6 HOURS PRN
COMMUNITY

## 2022-08-12 RX ORDER — LEVOTHYROXINE SODIUM 0.05 MG/1
100 TABLET ORAL 2 TIMES WEEKLY
COMMUNITY

## 2022-08-12 RX ORDER — MIDAZOLAM HYDROCHLORIDE 1 MG/ML
INJECTION INTRAMUSCULAR; INTRAVENOUS AS NEEDED
Status: DISCONTINUED | OUTPATIENT
Start: 2022-08-12 | End: 2022-08-12 | Stop reason: HOSPADM

## 2022-08-12 RX ORDER — ONDANSETRON 4 MG/1
4 TABLET, FILM COATED ORAL EVERY 6 HOURS PRN
Status: DISCONTINUED | OUTPATIENT
Start: 2022-08-12 | End: 2022-08-14

## 2022-08-12 RX ORDER — SODIUM CHLORIDE 0.9 % (FLUSH) 0.9 %
3-10 SYRINGE (ML) INJECTION AS NEEDED
Status: DISCONTINUED | OUTPATIENT
Start: 2022-08-12 | End: 2022-08-12 | Stop reason: HOSPADM

## 2022-08-12 RX ORDER — PREDNISONE 1 MG/1
5 TABLET ORAL
Status: DISCONTINUED | OUTPATIENT
Start: 2022-08-17 | End: 2022-08-16 | Stop reason: HOSPADM

## 2022-08-12 RX ORDER — SODIUM CHLORIDE 0.9 % (FLUSH) 0.9 %
3 SYRINGE (ML) INJECTION EVERY 12 HOURS SCHEDULED
Status: DISCONTINUED | OUTPATIENT
Start: 2022-08-12 | End: 2022-08-12 | Stop reason: HOSPADM

## 2022-08-12 RX ORDER — FENTANYL CITRATE 50 UG/ML
INJECTION, SOLUTION INTRAMUSCULAR; INTRAVENOUS AS NEEDED
Status: DISCONTINUED | OUTPATIENT
Start: 2022-08-12 | End: 2022-08-12 | Stop reason: HOSPADM

## 2022-08-12 RX ORDER — PREDNISONE 1 MG/1
5 TABLET ORAL ONCE
Status: COMPLETED | OUTPATIENT
Start: 2022-08-12 | End: 2022-08-12

## 2022-08-12 RX ORDER — TACROLIMUS 0.5 MG/1
1 CAPSULE ORAL EVERY 12 HOURS
Status: DISCONTINUED | OUTPATIENT
Start: 2022-08-12 | End: 2022-08-12

## 2022-08-12 RX ORDER — METOPROLOL SUCCINATE 50 MG/1
50 TABLET, EXTENDED RELEASE ORAL DAILY
Status: DISCONTINUED | OUTPATIENT
Start: 2022-08-12 | End: 2022-08-16 | Stop reason: HOSPADM

## 2022-08-12 RX ORDER — TACROLIMUS 0.5 MG/1
1 CAPSULE ORAL 2 TIMES DAILY
Status: DISCONTINUED | OUTPATIENT
Start: 2022-08-12 | End: 2022-08-16 | Stop reason: HOSPADM

## 2022-08-12 RX ORDER — HYDROCODONE BITARTRATE AND ACETAMINOPHEN 7.5; 325 MG/1; MG/1
1 TABLET ORAL 4 TIMES DAILY PRN
COMMUNITY

## 2022-08-12 RX ORDER — LEVOTHYROXINE SODIUM 0.1 MG/1
100 TABLET ORAL 2 TIMES WEEKLY
Status: DISCONTINUED | OUTPATIENT
Start: 2022-08-13 | End: 2022-08-16 | Stop reason: HOSPADM

## 2022-08-12 RX ORDER — DIAZEPAM 5 MG/1
5 TABLET ORAL 2 TIMES DAILY PRN
Status: DISCONTINUED | OUTPATIENT
Start: 2022-08-12 | End: 2022-08-16 | Stop reason: HOSPADM

## 2022-08-12 RX ORDER — POTASSIUM CHLORIDE 20 MEQ/1
20 TABLET, EXTENDED RELEASE ORAL DAILY
Status: DISCONTINUED | OUTPATIENT
Start: 2022-08-12 | End: 2022-08-16 | Stop reason: HOSPADM

## 2022-08-12 RX ORDER — DIPHENHYDRAMINE HCL 25 MG
25 CAPSULE ORAL EVERY 6 HOURS PRN
Status: DISCONTINUED | OUTPATIENT
Start: 2022-08-12 | End: 2022-08-16 | Stop reason: HOSPADM

## 2022-08-12 RX ORDER — LEVOTHYROXINE SODIUM 0.05 MG/1
50 TABLET ORAL
Status: DISCONTINUED | OUTPATIENT
Start: 2022-08-12 | End: 2022-08-16 | Stop reason: HOSPADM

## 2022-08-12 RX ORDER — DULOXETIN HYDROCHLORIDE 20 MG/1
40 CAPSULE, DELAYED RELEASE ORAL DAILY
COMMUNITY

## 2022-08-12 RX ORDER — HYDROCODONE BITARTRATE AND ACETAMINOPHEN 7.5; 325 MG/1; MG/1
1 TABLET ORAL EVERY 6 HOURS PRN
Status: DISCONTINUED | OUTPATIENT
Start: 2022-08-12 | End: 2022-08-16 | Stop reason: HOSPADM

## 2022-08-12 RX ORDER — PREDNISONE 10 MG/1
10 TABLET ORAL DAILY
Status: COMPLETED | OUTPATIENT
Start: 2022-08-13 | End: 2022-08-16

## 2022-08-12 RX ORDER — PREDNISONE 1 MG/1
5 TABLET ORAL
Status: COMPLETED | OUTPATIENT
Start: 2022-08-12 | End: 2022-08-12

## 2022-08-12 RX ORDER — ONDANSETRON 2 MG/ML
4 INJECTION INTRAMUSCULAR; INTRAVENOUS EVERY 6 HOURS PRN
Status: DISCONTINUED | OUTPATIENT
Start: 2022-08-12 | End: 2022-08-14

## 2022-08-12 RX ORDER — AZATHIOPRINE 50 MG/1
50 TABLET ORAL EVERY 12 HOURS
Status: DISCONTINUED | OUTPATIENT
Start: 2022-08-12 | End: 2022-08-12

## 2022-08-12 RX ORDER — ALBUTEROL SULFATE 90 UG/1
2 AEROSOL, METERED RESPIRATORY (INHALATION) EVERY 6 HOURS PRN
Status: DISCONTINUED | OUTPATIENT
Start: 2022-08-12 | End: 2022-08-16 | Stop reason: HOSPADM

## 2022-08-12 RX ORDER — PREDNISONE 1 MG/1
5 TABLET ORAL DAILY
Status: DISCONTINUED | OUTPATIENT
Start: 2022-08-13 | End: 2022-08-12

## 2022-08-12 RX ORDER — LIDOCAINE HYDROCHLORIDE 20 MG/ML
INJECTION, SOLUTION INFILTRATION; PERINEURAL AS NEEDED
Status: DISCONTINUED | OUTPATIENT
Start: 2022-08-12 | End: 2022-08-12 | Stop reason: HOSPADM

## 2022-08-12 RX ADMIN — Medication 10 ML: at 20:18

## 2022-08-12 RX ADMIN — PREDNISONE 5 MG: 5 TABLET ORAL at 13:09

## 2022-08-12 RX ADMIN — Medication 600 MG: at 20:16

## 2022-08-12 RX ADMIN — HYDROCODONE BITARTRATE AND ACETAMINOPHEN 1 TABLET: 7.5; 325 TABLET ORAL at 22:32

## 2022-08-12 RX ADMIN — HYDROCODONE BITARTRATE AND ACETAMINOPHEN 1 TABLET: 7.5; 325 TABLET ORAL at 09:21

## 2022-08-12 RX ADMIN — Medication 10 ML: at 08:20

## 2022-08-12 RX ADMIN — Medication 600 MG: at 10:21

## 2022-08-12 RX ADMIN — ONDANSETRON 4 MG: 2 INJECTION INTRAMUSCULAR; INTRAVENOUS at 02:18

## 2022-08-12 RX ADMIN — DIAZEPAM 5 MG: 5 TABLET ORAL at 22:32

## 2022-08-12 RX ADMIN — IOPAMIDOL 84 ML: 755 INJECTION, SOLUTION INTRAVENOUS at 00:19

## 2022-08-12 RX ADMIN — AZATHIOPRINE 50 MG: 50 TABLET ORAL at 02:32

## 2022-08-12 RX ADMIN — PREDNISONE 5 MG: 5 TABLET ORAL at 07:26

## 2022-08-12 RX ADMIN — AZATHIOPRINE 50 MG: 50 TABLET ORAL at 20:17

## 2022-08-12 RX ADMIN — TACROLIMUS 1 MG: 0.5 CAPSULE ORAL at 20:16

## 2022-08-12 RX ADMIN — TACROLIMUS 1 MG: 0.5 CAPSULE ORAL at 02:23

## 2022-08-12 RX ADMIN — TACROLIMUS 1 MG: 0.5 CAPSULE ORAL at 13:09

## 2022-08-12 RX ADMIN — REMDESIVIR 100 MG: 100 INJECTION, POWDER, LYOPHILIZED, FOR SOLUTION INTRAVENOUS at 20:17

## 2022-08-12 RX ADMIN — AZATHIOPRINE 50 MG: 50 TABLET ORAL at 13:09

## 2022-08-12 NOTE — PROGRESS NOTES
[facility] Medicine Services Daily Progress Note    Patient Name: Jaja Carcamo  : 1945  MRN: [id number]  Primary Care Physician:  Gary Bunch MD  Date of admission: 2022      Subjective      Chief Complaint: Chest pain and shortness of breath      Patient Reports     22: Tmax 100.9F. Complains of chronic back pain.  Vaccinated.  Planned LHC this afternoon.  On heparin drip.  Home oxygen.    Review of Systems   All other systems reviewed and are negative.         Objective      Vitals:   Temp:  [99.7 °F (37.6 °C)-100.9 °F (38.3 °C)] 100.2 °F (37.9 °C)  Heart Rate:  [60-81] 81  Resp:  [18-20] 20  BP: (115-141)/(50-77) 132/58  Flow (L/min):  [3-5] 5    Physical Exam  HENT:      Head: Normocephalic.      Mouth/Throat:      Mouth: Mucous membranes are moist.   Eyes:      General: No scleral icterus.     Extraocular Movements: Extraocular movements intact.      Pupils: Pupils are equal, round, and reactive to light.   Cardiovascular:      Rate and Rhythm: Normal rate and regular rhythm.   Pulmonary:      Effort: Pulmonary effort is normal.   Abdominal:      General: Bowel sounds are normal.   Musculoskeletal:         General: Normal range of motion.      Cervical back: Normal range of motion.   Skin:     General: Skin is warm.   Neurological:      Mental Status: She is alert. Mental status is at baseline.   Psychiatric:         Mood and Affect: Mood normal.             Result Review    Result Review:  I have personally reviewed the results from the time of this admission to 2022 13:35 EDT and agree with these findings:  [x]  Laboratory  []  Microbiology  [x]  Radiology  []  EKG/Telemetry   []  Cardiology/Vascular   []  Pathology  [x]  Old records  []  Other:            Assessment & Plan      Brief Patient Summary:        Acetylcysteine, 600 mg, Oral, BID  atorvastatin, 10 mg, Oral, Daily  azaTHIOprine, 50 mg, Oral, BID  DULoxetine, 40 mg, Oral, Daily  [START ON 2022]  levothyroxine, 100 mcg, Oral, Once per day on Sun Sat  levothyroxine, 50 mcg, Oral, Daily  metoprolol succinate XL, 50 mg, Oral, Daily  potassium chloride, 20 mEq, Oral, Daily  [START ON 8/13/2022] predniSONE, 10 mg, Oral, Daily  [START ON 8/17/2022] predniSONE, 5 mg, Oral, Daily With Breakfast  remdesivir, 100 mg, Intravenous, Q24H  sodium chloride, 10 mL, Intravenous, Q12H  tacrolimus, 1 mg, Oral, BID       heparin, 12 Units/kg/hr, Last Rate: 14 Units/kg/hr (08/12/22 1312)  Pharmacy Consult - Remdesivir,          Active Hospital Problems:  Active Hospital Problems    Diagnosis    • **Dyspnea on exertion    • Elevated troponin    • COVID-19 virus detected    • COPD (chronic obstructive pulmonary disease) ([facility])    • NSTEMI (non-ST elevated myocardial infarction) ([facility])      Added automatically from request for surgery [id number]       NSTEMI:  -Cardiology consulted  -Continue heparin drip  -[facility] planned 8/12/2022    COVID-19 pneumonia:  -Vaccinated patient  -Continue remdesivir, prednisone and bronchodilators    History of renal transplant:  -Continue Imuran and Prograf prednisone  -Nephrology following    Chronic hypoxemic respiratory failure:  -Titrate pulse ox> 92%    Chronic back pain:  -Norco PRN        DVT prophylaxis:  Medical DVT prophylaxis orders are present.    CODE STATUS:    Code Status (Patient has no pulse and is not breathing): CPR (Attempt to Resuscitate)  Medical Interventions (Patient has pulse or is breathing): Full Support      Disposition:  I expect patient to be discharged home.    This patient has been examined wearing appropriate Personal Protective Equipment and discussed with nursing. 08/12/22      Electronically signed by Clyde White DO, 08/12/22, 13:35 EDT.  [facility] Hospitalist Team

## 2022-08-12 NOTE — CONSULTS
Referring Provider: Clyde White,*  Reason for Consultation: NSTEMI/Chest pain    Patient Care Team:  Gary Bunch MD as PCP - General (Internal Medicine)    Chief complaint SOB and Chest pain    Subjective .     History of present illness:  Jaja Carcamo is a 77 y.o. female with multiple cardiovascular risk factors who presented with chest pain and worsening shortness of breath.  She has chronic hypoxemic respiratory failure which requires 3 L of oxygen at home.  Upon presentation to the emergency room she was noted to be desaturating requiring escalation in oxygen therapy.  She also reports worsening leg edema.  She has a history of renal transplant 5 years ago and history of lung cancer with resection the year before transplant.  She has been found to be COVID-positive.  She was recently seen at [facility] for worsening shortness of breath where a CT chest showed no significant acute findings other than a small bilateral pleural effusion.  Coronary artery calcifications were noted.  I have reviewed her blood work which shows elevated troponin which has increased to 0.5, proBNP 14,700, renal function is normal, LFTs are elevated, D-dimer is elevated ECG shows sinus tachycardia with diffuse submillimeter ST depressions.        ROS  REVIEW OF SYSTEMS:    Constitutional: No weakness,fatigue, fever, rigors, chills   Eyes: No vision changes, eye pain   ENT/oropharynx: No difficulty swallowing, sore throat, epistaxis, changes in hearing   Cardiovascular: + chest pain, chest tightness, palpitations, paroxysmal nocturnal dyspnea, orthopnea, diaphoresis, dizziness / syncopal episode   Respiratory: + shortness of breath, + dyspnea on exertion, cough, wheezing hemoptysis   Gastrointestinal: No abdominal pain, nausea, vomiting, diarrhea, bloody stools   Genitourinary: No hematuria, dysuria   Neurological: No headache, tremors, numbness,  one-sided weakness    Musculoskeletal: No cramps,  myalgias,  joint pain, joint swelling   Integument: No rash, edema           History  Past Medical History:   Diagnosis Date   • Cancer (HCC)     lung   • COPD (chronic obstructive pulmonary disease) (HCC)    • History of appendectomy    • Hypertension    • Renal disease    • Hartman syndrome        Past Surgical History:   Procedure Laterality Date   • BREAST SURGERY Left     cysts rmeoved   • CLOSED REDUCTION WRIST FRACTURE Right 3/1/2022    Procedure: WRIST CLOSED REDUCTION;  Surgeon: Gaudencio Townsend MD;  Location: [facility] MAIN OR;  Service: Orthopedics;  Laterality: Right;   • CYSTOSCOPY     • HYSTERECTOMY     • LUNG LOBECTOMY Right    • TRANSPLANTATION RENAL         Family History   Problem Relation Age of Onset   • No Known Problems Mother    • No Known Problems Father        Social History     Tobacco Use   • Smoking status: Former Smoker   • Smokeless tobacco: Never Used   Vaping Use   • Vaping Use: Former   Substance Use Topics   • Alcohol use: Never   • Drug use: Never        Medications Prior to Admission   Medication Sig Dispense Refill Last Dose   • predniSONE (DELTASONE) 5 MG tablet Take 5 mg by mouth Daily.   8/11/2022 at Unknown time   • azaTHIOprine (IMURAN) 50 MG tablet Take 50 mg by mouth 2 (Two) Times a Day.      • diazePAM (VALIUM) 5 MG tablet Take 5 mg by mouth 2 (Two) Times a Day As Needed for Anxiety.      • furosemide (LASIX) 40 MG tablet Take 40 mg by mouth Daily.      • levothyroxine (SYNTHROID, LEVOTHROID) 50 MCG tablet Take 50 mcg by mouth Daily.      • metoprolol tartrate (LOPRESSOR) 50 MG tablet Take 50 mg by mouth Daily.      • potassium chloride (KLOR-CON) 20 MEQ packet Take 20 mEq by mouth Daily.      • simvastatin (ZOCOR) 20 MG tablet Take 20 mg by mouth Every Night.      • tacrolimus (PROGRAF) 1 MG capsule Take 1 mg by mouth 2 (Two) Times a Day.            Zolpidem    Scheduled Meds:[START ON 8/12/2022] remdesivir, 100 mg, Intravenous, Q24H  sodium chloride, 10 mL, Intravenous,  "Q12H      Continuous Infusions:heparin, 12 Units/kg/hr, Last Rate: 12 Units/kg/hr (08/11/22 2105)  Pharmacy Consult - Remdesivir,   sodium chloride, 100 mL/hr, Last Rate: 100 mL/hr (08/11/22 2030)      PRN Meds:.•  acetaminophen **OR** acetaminophen **OR** acetaminophen  •  heparin  •  heparin  •  ondansetron **OR** ondansetron  •  Pharmacy Consult - Remdesivir  •  [COMPLETED] Insert peripheral IV **AND** sodium chloride  •  sodium chloride    Objective     VITAL SIGNS  Vitals:    08/11/22 2016 08/11/22 2032 08/11/22 2117 08/11/22 2154   BP: 130/50 117/53 141/66 121/50   BP Location:    Left arm   Patient Position:    Lying   Pulse: 66 64 62 63   Resp:    20   Temp:    99.7 °F (37.6 °C)   TempSrc:    Oral   SpO2:    98%   Weight:    81.4 kg (179 lb 7.3 oz)   Height:    170.2 cm (67\")       Flowsheet Rows    Flowsheet Row First Filed Value   Admission Height 167.6 cm (66\") Documented at 08/11/2022 1301   Admission Weight 81.6 kg (180 lb) Documented at 08/11/2022 1301          No intake or output data in the 24 hours ending 08/11/22 2221     TELEMETRY:    Physical Exam:  The patient is alert, oriented and in no distress.  Vital signs as noted above.  Head and neck revealed no carotid bruits or jugular venous distention.  No thyromegaly or lymph adenopathy is present  Lungs clear.  Distant breath sounds are normal bilaterally.  Heart normal first and second heart sounds.No murmur.  No precordial rub is present.  No gallop is present.  Abdomen soft and nontender.  No organomegaly is present.  Extremities with good peripheral pulses with 2+ pedal edema.  Skin warm and dry.  Musculoskeletal system is grossly normal  CNS grossly normal      Results Review:   I reviewed the patient's new clinical results.  Lab Results (last 24 hours)     Procedure Component Value Units Date/Time    D-dimer, Quantitative [[id number]]  (Abnormal) Collected: 08/11/22 1759    Specimen: Blood Updated: 08/11/22 2022     D-Dimer, Quantitative 2.32 " mg/L (FEU)     Narrative:      Reference Range  --------------------------------------------------------------------     < 0.50   Negative Predictive Value  0.50-0.59   Indeterminate    >= 0.60   Probable VTE             A very low percentage of patients with DVT may yield D-Dimer results   below the cut-off of 0.50 mg/L FEU.  This is known to be more   prevalent in patients with distal DVT.             Results of this test should always be interpreted in conjunction with   the patient's medical history, clinical presentation and other   findings.  Clinical diagnosis should not be based on the result of   INNOVANCE D-Dimer alone.    Protime-INR [[id number]]  (Normal) Collected: 08/11/22 1759    Specimen: Blood Updated: 08/11/22 1827     Protime 11.2 Seconds      INR 1.09    aPTT [[id number]]  (Abnormal) Collected: 08/11/22 1759    Specimen: Blood Updated: 08/11/22 1827     PTT 28.5 seconds     Troponin [[id number]]  (Abnormal) Collected: 08/11/22 1614    Specimen: Blood Updated: 08/11/22 1702     Troponin T 0.486 ng/mL     Narrative:      Troponin T Reference Range:  <= 0.03 ng/mL-   Negative for AMI  >0.03 ng/mL-     Abnormal for myocardial necrosis.  Clinicians would have to utilize clinical acumen, EKG, Troponin and serial changes to determine if it is an Acute Myocardial Infarction or myocardial injury due to an underlying chronic condition.       Results may be falsely decreased if patient taking Biotin.      Comprehensive Metabolic Panel [[id number]]  (Abnormal) Collected: 08/11/22 1614    Specimen: Blood Updated: 08/11/22 1701     Glucose 103 mg/dL      BUN 16 mg/dL      Creatinine 0.70 mg/dL      Sodium 140 mmol/L      Potassium 4.4 mmol/L      Chloride 104 mmol/L      CO2 28.0 mmol/L      Calcium 9.3 mg/dL      Total Protein 5.8 g/dL      Albumin 3.90 g/dL      ALT (SGPT) 34 U/L      AST (SGOT) 76 U/L      Alkaline Phosphatase 55 U/L      Total Bilirubin 1.4 mg/dL      Globulin 1.9 gm/dL      A/G Ratio 2.1 g/dL       BUN/Creatinine Ratio 22.9     Anion Gap 8.0 mmol/L      eGFR 90.3 mL/min/1.73      Comment: National Kidney Foundation and American Society of Nephrology (ASN) Task Force recommended calculation based on the Chronic Kidney Disease Epidemiology Collaboration (CKD-EPI) equation refit without adjustment for race.       Narrative:      GFR Normal >60  Chronic Kidney Disease <60  Kidney Failure <15      BNP [[id number]]  (Abnormal) Collected: 08/11/22 1614    Specimen: Blood Updated: 08/11/22 1658     proBNP 14,786.0 pg/mL     Narrative:      Among patients with dyspnea, NT-proBNP is highly sensitive for the detection of acute congestive heart failure. In addition NT-proBNP of <300 pg/ml effectively rules out acute congestive heart failure with 99% negative predictive value.    Results may be falsely decreased if patient taking Biotin.      COVID-19,CEPHEID/JACK,COR/TWILA/PAD/DEVAN IN-HOUSE(OR EMERGENT/ADD-ON),NP SWAB IN TRANSPORT MEDIA 3-4 HR TAT, RT-PCR - Swab, Nasopharynx [[id number]]  (Abnormal) Collected: 08/11/22 1614    Specimen: Swab from Nasopharynx Updated: 08/11/22 1653     COVID19 Detected    Narrative:      Fact sheet for providers: https://www.fda.gov/media/168214/download     Fact sheet for patients: https://www.fda.gov/media/035746/download  Fact sheet for providers: https://www.fda.gov/media/060311/download    Fact sheet for patients: https://www.fda.gov/media/127876/download    Test performed by PCR.    Extra Tubes [[id number]] Collected: 08/11/22 1614    Specimen: Blood, Venous Line Updated: 08/11/22 1642    Narrative:      The following orders were created for panel order Extra Tubes.  Procedure                               Abnormality         Status                     ---------                               -----------         ------                     [facility][[id number]]                                   Final result                 Please view results for these tests on the individual  orders.    [facility] Top - SST [[id number]] Collected: 08/11/22 1614    Specimen: Blood Updated: 08/11/22 1642     Extra Tube hold    CBC & Differential [[id number]]  (Abnormal) Collected: 08/11/22 1614    Specimen: Blood Updated: 08/11/22 1629    Narrative:      The following orders were created for panel order CBC & Differential.  Procedure                               Abnormality         Status                     ---------                               -----------         ------                     CBC Auto Differential[[id number]]        Abnormal            Final result                 Please view results for these tests on the individual orders.    CBC Auto Differential [[id number]]  (Abnormal) Collected: 08/11/22 1614    Specimen: Blood Updated: 08/11/22 1629     WBC 3.40 10*3/mm3      RBC 3.62 10*6/mm3      Hemoglobin 11.5 g/dL      Hematocrit 34.9 %      MCV 96.3 fL      MCH 31.7 pg      MCHC 32.9 g/dL      RDW 15.2 %      RDW-SD 52.1 fl      MPV 7.3 fL      Platelets 94 10*3/mm3      Neutrophil % 81.6 %      Lymphocyte % 7.9 %      Monocyte % 10.3 %      Eosinophil % 0.0 %      Basophil % 0.2 %      Neutrophils, Absolute 2.80 10*3/mm3      Lymphocytes, Absolute 0.30 10*3/mm3      Monocytes, Absolute 0.40 10*3/mm3      Eosinophils, Absolute 0.00 10*3/mm3      Basophils, Absolute 0.00 10*3/mm3      nRBC 0.3 /100 WBC           Imaging Results (Last 24 Hours)     Procedure Component Value Units Date/Time    XR Chest 1 View [[id number]] Collected: 08/11/22 1627     Updated: 08/11/22 1630    Narrative:      DATE OF EXAM:  8/11/2022 4:17 PM     PROCEDURE:  XR CHEST 1 VW-     INDICATIONS:  soa       COMPARISON:  PA and lateral chest 5/17/2016     TECHNIQUE:   Single radiographic view of the chest was obtained.     FINDINGS:  Heart size is moderately enlarged. There are small layering bilateral  pleural effusions. Increased interstitial thickening in both lungs  suggest the presence of pulmonary edema. There is probable  mild linear  left basilar atelectasis. Benign calcified granuloma in the left upper  lobe. No acute osseous abnormality.       Impression:      Moderate cardiomegaly with features of interstitial edema and small  bilateral pleural effusions. Probable mild left basilar atelectasis.     Electronically Signed By-Karie Bustos MD On:8/11/2022 4:28 PM  This report was finalized on [id number] by  Karie Bustos MD.      LAB RESULTS (LAST 7 DAYS)    CBC  Results from last 7 days   Lab Units 08/11/22  1614   WBC 10*3/mm3 3.40   RBC 10*6/mm3 3.62*   HEMOGLOBIN g/dL 11.5*   HEMATOCRIT % 34.9   MCV fL 96.3   PLATELETS 10*3/mm3 94*       BMP  Results from last 7 days   Lab Units 08/11/22  1614 08/08/22  1603   SODIUM mmol/L 140  --    POTASSIUM mmol/L 4.4  --    CHLORIDE mmol/L 104  --    CO2 mmol/L 28.0  --    BUN mg/dL 16  --    CREATININE mg/dL 0.70  --    GLUCOSE mg/dL 103*  --    MAGNESIUM mg/dL  --  1.7       CMP   Results from last 7 days   Lab Units 08/11/22  1614   SODIUM mmol/L 140   POTASSIUM mmol/L 4.4   CHLORIDE mmol/L 104   CO2 mmol/L 28.0   BUN mg/dL 16   CREATININE mg/dL 0.70   GLUCOSE mg/dL 103*   ALBUMIN g/dL 3.90   BILIRUBIN mg/dL 1.4*   ALK PHOS U/L 55   AST (SGOT) U/L 76*   ALT (SGPT) U/L 34*         BNP  Results from last 7 days   Lab Units 08/08/22  1603   BNP pg/mL 716*       TROPONIN  Results from last 7 days   Lab Units 08/11/22  1614 08/08/22  1603   TROPONIN I ng/mL  --  <0.03   TROPONIN T ng/mL 0.486*  --        CoAg  Results from last 7 days   Lab Units 08/11/22  1759 08/08/22  1657   INR  1.09 1.0   APTT seconds 28.5* 33.7       Creatinine Clearance  Estimated Creatinine Clearance: 76.2 mL/min (by C-G formula based on SCr of 0.7 mg/dL).    ABG        Radiology  XR Chest 1 View    Result Date: 8/11/2022  Moderate cardiomegaly with features of interstitial edema and small bilateral pleural effusions. Probable mild left basilar atelectasis.  Electronically Signed By-Karie Bustos MD  On:8/11/2022 4:28 PM This report was finalized on [id number] by  Karie Bustos MD.        EKG      I personally viewed and interpreted the patient's EKG/Telemetry data:    ECHOCARDIOGRAM:    Results for orders placed during the hospital encounter of 10/14/20    Adult Transthoracic Echo Complete W/ Cont if Necessary Per Protocol    Interpretation Summary  · Left ventricular wall thickness is consistent with concentric hypertrophy.  · Estimated left ventricular EF = 70% Left ventricular systolic function is normal.  · The left atrial cavity is moderately dilated.  · The right atrial cavity is mildly dilated.  · Mild mitral annular calcification is present.  · Mild mitral valve regurgitation is present.  · Left ventricular diastolic function is consistent with (grade I) impaired relaxation.  · Estimated right ventricular systolic pressure from tricuspid regurgitation is mildly elevated (35-45 mmHg).          OTHER:     Assessment & Plan         Dyspnea on exertion    Elevated troponin/NSTEMI    COVID-19 virus detected    COPD (chronic obstructive pulmonary disease) (HCC)     Kidney transplant recipient       77-year-old woman with multiple cardiovascular risk factors presents with shortness of breath, dyspnea on exertion and chest pain.  She has nonspecific ST-T wave changes on her ECG however her troponin is elevated.  Obtain an echocardiogram  She is at a high risk of developing pulmonary embolism, obtain a CT PE to rule out PE.  D-dimer is also elevated  Continue immunosuppressant therapy for kidney transplant creatinine is normal,   Start aspirin, high intensity statin and heparin drip.  I will proceed with cardiac catheterization via groin access tomorrow.  Risk and benefit of the procedure were discussed in details with the patient and she has agreed to proceed  She has been on dialysis for 7 years, has coronary calcification which puts her at a high risk of having obstructive coronary disease.  Oxygen  supplementation for respiratory failure/COVID-19.  Start IV diuretics for volume overload.  Will assess volume status during cardiac cath tomorrow.        Jak Gavin MD  08/11/22  22:21 EDT

## 2022-08-12 NOTE — PLAN OF CARE
Problem: Adult Inpatient Plan of Care  Goal: Absence of Hospital-Acquired Illness or Injury  Intervention: Identify and Manage Fall Risk  Recent Flowsheet Documentation  Taken 8/12/2022 0410 by Anna Spencer RN  Safety Promotion/Fall Prevention:   fall prevention program maintained   clutter free environment maintained   assistive device/personal items within reach   lighting adjusted   nonskid shoes/slippers when out of bed   room organization consistent   safety round/check completed  Intervention: Prevent Skin Injury  Recent Flowsheet Documentation  Taken 8/12/2022 0410 by Anna Spencer RN  Body Position:   position changed independently   side-lying   right  Intervention: Prevent and Manage VTE (Venous Thromboembolism) Risk  Recent Flowsheet Documentation  Taken 8/12/2022 0410 by Anna Spencer RN  Activity Management: (bsc)   activity adjusted per tolerance   ambulated in room  VTE Prevention/Management: patient refused intervention  Range of Motion: active ROM (range of motion) encouraged  Intervention: Prevent Infection  Recent Flowsheet Documentation  Taken 8/12/2022 0410 by Anna Spencer RN  Infection Prevention:   single patient room provided   visitors restricted/screened   hand hygiene promoted   equipment surfaces disinfected   environmental surveillance performed   personal protective equipment utilized   rest/sleep promoted  Goal: Optimal Comfort and Wellbeing  Intervention: Provide Person-Centered Care  Recent Flowsheet Documentation  Taken 8/12/2022 0410 by Anna Spencer RN  Trust Relationship/Rapport:   care explained   choices provided   thoughts/feelings acknowledged   emotional support provided   empathic listening provided     Problem: Adjustment to Illness COPD (Chronic Obstructive Pulmonary Disease)  Goal: Optimal Chronic Illness Coping  Intervention: Support and Optimize Psychosocial Response  Recent Flowsheet Documentation  Taken 8/12/2022 0410 by Anna Spencer  RN  Supportive Measures:   active listening utilized   problem-solving facilitated   verbalization of feelings encouraged  Family/Support System Care:   caregiver stress acknowledged   support provided     Problem: Functional Ability Impaired COPD (Chronic Obstructive Pulmonary Disease)  Goal: Optimal Level of Functional [place]  Intervention: Optimize Functional Ability  Recent Flowsheet Documentation  Taken 8/12/2022 0410 by Anna Spencer RN  Activity Management: (bsc)   activity adjusted per tolerance   ambulated in room  Environmental Support:   calm environment promoted   caregiver consistency promoted   rest periods encouraged  Self-Care Promotion:   independence encouraged   BADL personal objects within reach   BADL personal routines maintained     Problem: Infection COPD (Chronic Obstructive Pulmonary Disease)  Goal: Absence of Infection Signs and Symptoms  Intervention: Prevent or Manage Infection  Recent Flowsheet Documentation  Taken 8/12/2022 0410 by Anna Spencer RN  Infection Management: aseptic technique maintained  Fever Reduction/Comfort Measures:   cool cloth applied   lightweight clothing   fluid intake increased  Isolation Precautions:   droplet   airborne   precautions maintained     Problem: Respiratory Compromise COPD (Chronic Obstructive Pulmonary Disease)  Goal: Effective Oxygenation and Ventilation  Intervention: Promote Airway Secretion Clearance  Recent Flowsheet Documentation  Taken 8/12/2022 0410 by Anna Spencer RN  Activity Management: (bsc)   activity adjusted per tolerance   ambulated in room  Cough And Deep Breathing: done independently per patient  Intervention: Optimize Oxygenation and Ventilation  Recent Flowsheet Documentation  Taken 8/12/2022 0410 by Anna Spencer RN  Head of Bed (HOB) Positioning: HOB at 20-30 degrees     Problem: Fall Injury Risk  Goal: Absence of Fall and Fall-Related Injury  Intervention: Identify and Manage Contributors  Recent  Flowsheet Documentation  Taken 8/12/2022 0410 by Anna Spencer RN  Medication Review/Management: medications reviewed  Self-Care Promotion:   independence encouraged   BADL personal objects within reach   BADL personal routines maintained  Intervention: Promote Injury-Free Environment  Recent Flowsheet Documentation  Taken 8/12/2022 0410 by Anna Spencer RN  Safety Promotion/Fall Prevention:   fall prevention program maintained   clutter free environment maintained   assistive device/personal items within reach   lighting adjusted   nonskid shoes/slippers when out of bed   room organization consistent   safety round/check completed     Problem: COPD (Chronic Obstructive Pulmonary Disease) Comorbidity  Goal: Maintenance of COPD Symptom Control  Intervention: Maintain COPD-Symptom Control  Recent Flowsheet Documentation  Taken 8/12/2022 0410 by Anna Spencer RN  Supportive Measures:   active listening utilized   problem-solving facilitated   verbalization of feelings encouraged  Medication Review/Management: medications reviewed     Problem: Hypertension Comorbidity  Goal: Blood Pressure in Desired Range  Intervention: Maintain Blood Pressure Management  Recent Flowsheet Documentation  Taken 8/12/2022 0410 by Anna Spencer RN  Medication Review/Management: medications reviewed   Goal Outcome Evaluation:

## 2022-08-12 NOTE — PROGRESS NOTES
Referring Provider: Clyde White,*    Reason for follow-up: Shortness of breath and elevated troponin     Patient Care Team:  Gary Bunch MD as PCP - General (Internal Medicine)    Subjective .      She continues to complain of shortness of breath, chest pain is better.    History  Past Medical History:   Diagnosis Date   • Cancer (HCC)     lung   • COPD (chronic obstructive pulmonary disease) (HCC)    • History of appendectomy    • Hypertension    • Renal disease    • Hartman syndrome        Past Surgical History:   Procedure Laterality Date   • BREAST SURGERY Left     cysts rmeoved   • CLOSED REDUCTION WRIST FRACTURE Right 3/1/2022    Procedure: WRIST CLOSED REDUCTION;  Surgeon: Gaudencio Townsend MD;  Location: [facility] MAIN OR;  Service: Orthopedics;  Laterality: Right;   • CYSTOSCOPY     • HYSTERECTOMY     • LUNG LOBECTOMY Right    • TRANSPLANTATION RENAL         Family History   Problem Relation Age of Onset   • No Known Problems Mother    • No Known Problems Father        Social History     Tobacco Use   • Smoking status: Former Smoker   • Smokeless tobacco: Never Used   Vaping Use   • Vaping Use: Former   Substance Use Topics   • Alcohol use: Never   • Drug use: Never        Medications Prior to Admission   Medication Sig Dispense Refill Last Dose   • predniSONE (DELTASONE) 5 MG tablet Take 5 mg by mouth Daily.   8/11/2022 at Unknown time   • albuterol sulfate  (90 Base) MCG/ACT inhaler Inhale 2 puffs Every 6 (Six) Hours As Needed for Wheezing.      • azaTHIOprine (IMURAN) 50 MG tablet Take 50 mg by mouth 2 (Two) Times a Day.      • diazePAM (VALIUM) 5 MG tablet Take 5 mg by mouth 2 (Two) Times a Day As Needed for Anxiety.      • DULoxetine (CYMBALTA) 20 MG capsule Take 40 mg by mouth Daily.      • furosemide (LASIX) 40 MG tablet Take 40 mg by mouth 2 (Two) Times a Day.      • HYDROcodone-acetaminophen (NORCO) 7.5-325 MG per tablet Take 1 tablet by mouth 4 (Four) Times a Day As Needed for Severe  Pain .      • levothyroxine (SYNTHROID, LEVOTHROID) 50 MCG tablet Take 50 mcg by mouth See Admin Instructions. Monday through Friday   Unknown at Unknown time   • levothyroxine (SYNTHROID, LEVOTHROID) 50 MCG tablet Take 100 mcg by mouth 2 (Two) Times a Week. Saturday and Sunday   Unknown at Unknown time   • metoprolol succinate XL (TOPROL-XL) 50 MG 24 hr tablet Take 50 mg by mouth Daily.      • potassium chloride (K-DUR,KLOR-CON) 20 MEQ CR tablet Take 20 mEq by mouth Daily.      • simvastatin (ZOCOR) 20 MG tablet Take 20 mg by mouth Every Night.      • tacrolimus (PROGRAF) 1 MG capsule Take 1 mg by mouth 2 (Two) Times a Day.          Allergies  Zolpidem    Scheduled Meds:Acetylcysteine, 600 mg, Oral, BID  [MAR Hold] atorvastatin, 10 mg, Oral, Daily  [MAR Hold] azaTHIOprine, 50 mg, Oral, BID  [MAR Hold] DULoxetine, 40 mg, Oral, Daily  [MAR Hold] levothyroxine, 100 mcg, Oral, Once per day on Sun Sat  [MAR Hold] levothyroxine, 50 mcg, Oral, Daily  metoprolol succinate XL, 50 mg, Oral, Daily  [MAR Hold] potassium chloride, 20 mEq, Oral, Daily  [MAR Hold] predniSONE, 10 mg, Oral, Daily  [MAR Hold] predniSONE, 5 mg, Oral, Daily With Breakfast  remdesivir, 100 mg, Intravenous, Q24H  sodium chloride, 10 mL, Intravenous, Q12H  sodium chloride, 3 mL, Intravenous, Q12H  [MAR Hold] tacrolimus, 1 mg, Oral, BID      Continuous Infusions:heparin, 12 Units/kg/hr, Last Rate: Stopped (08/12/22 1713)  Pharmacy Consult - Remdesivir,       PRN Meds:.•  acetaminophen **OR** acetaminophen **OR** acetaminophen  •  acetaminophen  •  [MAR Hold] albuterol sulfate HFA  •  [MAR Hold] diazePAM  •  diphenhydrAMINE  •  heparin  •  heparin  •  HYDROcodone-acetaminophen  •  ondansetron **OR** ondansetron  •  ondansetron **OR** ondansetron  •  Pharmacy Consult - Remdesivir  •  [COMPLETED] Insert peripheral IV **AND** sodium chloride  •  sodium chloride  •  sodium chloride    Objective     VITAL SIGNS  Vitals:    08/12/22 1739 08/12/22 1739 08/12/22  "1756 08/12/22 1810   BP:  155/76 156/72 126/76   BP Location:       Patient Position:       Pulse:  96 78 82   Resp:   18    Temp:       TempSrc:       SpO2: 97% 97% 93% 94%   Weight:       Height:           Flowsheet Rows    Flowsheet Row First Filed Value   Admission Height 167.6 cm (66\") Documented at 08/11/2022 1301   Admission Weight 81.6 kg (180 lb) Documented at 08/11/2022 1301            Intake/Output Summary (Last 24 hours) at 8/12/2022 1838  Last data filed at 8/12/2022 0224  Gross per 24 hour   Intake 480 ml   Output 300 ml   Net 180 ml        TELEMETRY: No significant atrial or ventricular arrhythmia    Physical Exam:  The patient is alert, oriented and in no distress.  Vital signs as noted above.  Head and neck revealed no carotid bruits or jugular venous distention.  No thyromegaly or lymphadenopathy is present  Lungs clear.  No wheezing.  Breath sounds are normal bilaterally.  Heart normal first and second heart sounds.  No murmur. No precordial rub is present.  No gallop is present.  Abdomen soft and nontender.  No organomegaly is present.  Extremities with good peripheral pulses without any pedal edema.  Skin warm and dry.  Musculoskeletal system is grossly normal  CNS grossly normal      Results Review:   I reviewed the patient's new clinical results.  Lab Results (last 24 hours)     Procedure Component Value Units Date/Time    aPTT [[id number]]  (Abnormal) Collected: 08/12/22 1205    Specimen: Blood Updated: 08/12/22 1255     PTT 50.5 seconds     aPTT [[id number]]  (Abnormal) Collected: 08/12/22 0933    Specimen: Blood Updated: 08/12/22 1103     .1 seconds     Hepatic Function Panel [[id number]]  (Abnormal) Collected: 08/12/22 0933    Specimen: Blood Updated: 08/12/22 1057     Total Protein 6.0 g/dL      Albumin 3.80 g/dL      ALT (SGPT) 40 U/L      AST (SGOT) 74 U/L      Alkaline Phosphatase 53 U/L      Total Bilirubin 1.2 mg/dL      Bilirubin, Direct 0.4 mg/dL      Bilirubin, Indirect 0.8 " mg/dL     Creatinine, Serum [[id number]]  (Normal) Collected: 08/12/22 0933    Specimen: Blood Updated: 08/12/22 1057     Creatinine 0.69 mg/dL      eGFR 90.6 mL/min/1.73      Comment: National Kidney Foundation and American Society of Nephrology (ASN) Task Force recommended calculation based on the Chronic Kidney Disease Epidemiology Collaboration (CKD-EPI) equation refit without adjustment for race.       Narrative:      GFR Normal >60  Chronic Kidney Disease <60  Kidney Failure <15      Troponin [[id number]]  (Abnormal) Collected: 08/11/22 2305    Specimen: Blood Updated: 08/12/22 0000     Troponin T 0.424 ng/mL     Narrative:      Troponin T Reference Range:  <= 0.03 ng/mL-   Negative for AMI  >0.03 ng/mL-     Abnormal for myocardial necrosis.  Clinicians would have to utilize clinical acumen, EKG, Troponin and serial changes to determine if it is an Acute Myocardial Infarction or myocardial injury due to an underlying chronic condition.       Results may be falsely decreased if patient taking Biotin.      Hepatic Function Panel [[id number]]  (Abnormal) Collected: 08/11/22 2305    Specimen: Blood Updated: 08/11/22 2359     Total Protein 5.3 g/dL      Albumin 3.50 g/dL      ALT (SGPT) 33 U/L      AST (SGOT) 69 U/L      Alkaline Phosphatase 47 U/L      Total Bilirubin 1.2 mg/dL      Bilirubin, Direct 0.4 mg/dL      Bilirubin, Indirect 0.8 mg/dL     Creatinine, Serum [[id number]]  (Normal) Collected: 08/11/22 2305    Specimen: Blood Updated: 08/11/22 2359     Creatinine 0.72 mg/dL      eGFR 87.3 mL/min/1.73      Comment: National Kidney Foundation and American Society of Nephrology (ASN) Task Force recommended calculation based on the Chronic Kidney Disease Epidemiology Collaboration (CKD-EPI) equation refit without adjustment for race.       Narrative:      GFR Normal >60  Chronic Kidney Disease <60  Kidney Failure <15      aPTT [[id number]]  (Abnormal) Collected: 08/11/22 2305    Specimen: Blood Updated: 08/11/22 2349      PTT 59.5 seconds     D-dimer, Quantitative [[id number]]  (Abnormal) Collected: 08/11/22 1759    Specimen: Blood Updated: 08/11/22 2022     D-Dimer, Quantitative 2.32 mg/L (FEU)     Narrative:      Reference Range  --------------------------------------------------------------------     < 0.50   Negative Predictive Value  0.50-0.59   Indeterminate    >= 0.60   Probable VTE             A very low percentage of patients with DVT may yield D-Dimer results   below the cut-off of 0.50 mg/L FEU.  This is known to be more   prevalent in patients with distal DVT.             Results of this test should always be interpreted in conjunction with   the patient's medical history, clinical presentation and other   findings.  Clinical diagnosis should not be based on the result of   INNOVANCE D-Dimer alone.          Imaging Results (Last 24 Hours)     Procedure Component Value Units Date/Time    CT Angiogram Chest Pulmonary Embolism [[id number]] Collected: 08/12/22 0124     Updated: 08/12/22 0130    Narrative:      CT ANGIOGRAM CHEST WITH IV CONTRAST       INDICATION: Shortness of breath and elevated d-dimer. History of lobectomy for lung cancer.    COMPARISON: 4/1/2015    PROCEDURE: Multiple axial CT images were obtained of the chest after IV administration of 84 mL of Isovue-370.  Coronal and sagittal reformations as well as MIP images were obtained.  CT dose lowering techniques were used, to include: automated exposure   control, adjustment for patient size, and or use of iterative reconstruction.    FINDINGS:    Cardiovascular: No filling defects are seen in the pulmonary arteries. No focal aortic aneurysm or evidence of aortic dissection is seen.    Mediastinum/Torie: No significant mediastinal or hilar adenopathy is seen.    Lungs/Pleura :  Severe centrilobular emphysema. There has been a right lobectomy. It may have been the lower lobe of the exact point is difficult to say. There is suture material in the right lung base.  There are small pleural effusions.    Chest wall and Axilla: Unremarkable.    Bones:  No acute focal bony abnormality seen.    Upper abdomen: Severe renal atrophy.      Impression:      1. No pulmonary embolus.  2. Small pleural effusions.  3. Severe centrilobular emphysema but no airspace consolidation.  4. Right lobectomy.  5. Severe renal atrophy    Electronically signed by:  Zach Zendejas M.D.    8/11/2022 11:29 PM      LAB RESULTS (LAST 7 DAYS)    CBC  Results from last 7 days   Lab Units 08/11/22  1614   WBC 10*3/mm3 3.40   RBC 10*6/mm3 3.62*   HEMOGLOBIN g/dL 11.5*   HEMATOCRIT % 34.9   MCV fL 96.3   PLATELETS 10*3/mm3 94*       BMP  Results from last 7 days   Lab Units 08/12/22 0933 08/11/22 2305 08/11/22 1614 08/08/22  1603   SODIUM mmol/L  --   --  140  --    POTASSIUM mmol/L  --   --  4.4  --    CHLORIDE mmol/L  --   --  104  --    CO2 mmol/L  --   --  28.0  --    BUN mg/dL  --   --  16  --    CREATININE mg/dL 0.69 0.72 0.70  --    GLUCOSE mg/dL  --   --  103*  --    MAGNESIUM mg/dL  --   --   --  1.7       CMP   Results from last 7 days   Lab Units 08/12/22 0933 08/11/22 2305 08/11/22  1614   SODIUM mmol/L  --   --  140   POTASSIUM mmol/L  --   --  4.4   CHLORIDE mmol/L  --   --  104   CO2 mmol/L  --   --  28.0   BUN mg/dL  --   --  16   CREATININE mg/dL 0.69 0.72 0.70   GLUCOSE mg/dL  --   --  103*   ALBUMIN g/dL 3.80 3.50 3.90   BILIRUBIN mg/dL 1.2 1.2 1.4*   ALK PHOS U/L 53 47 55   AST (SGOT) U/L 74* 69* 76*   ALT (SGPT) U/L 40* 33 34*         BNP  Results from last 7 days   Lab Units 08/08/22  1603   BNP pg/mL 716*       TROPONIN  Results from last 7 days   Lab Units 08/11/22 2305 08/11/22 1614 08/08/22  1603   TROPONIN I ng/mL  --   --  <0.03   TROPONIN T ng/mL 0.424*   < >  --     < > = values in this interval not displayed.       CoAg  Results from last 7 days   Lab Units 08/12/22  1205 08/12/22  0933 08/11/22  2305 08/11/22  1759 08/08/22  1657   INR   --   --   --  1.09 1.0   APTT  seconds 50.5* 112.1* 59.5* 28.5* 33.7       Creatinine Clearance  Estimated Creatinine Clearance: 77.3 mL/min (by C-G formula based on SCr of 0.69 mg/dL).    ABG        Radiology  XR Chest 1 View    Result Date: 8/11/2022  Moderate cardiomegaly with features of interstitial edema and small bilateral pleural effusions. Probable mild left basilar atelectasis.  Electronically Signed By-Karie Bustos MD On:8/11/2022 4:28 PM This report was finalized on [id number] by  Karie Bustos MD.    CT Angiogram Chest Pulmonary Embolism    Result Date: 8/12/2022  1. No pulmonary embolus. 2. Small pleural effusions. 3. Severe centrilobular emphysema but no airspace consolidation. 4. Right lobectomy. 5. Severe renal atrophy Electronically signed by:  Zach Zendejas M.D.  8/11/2022 11:29 PM          EKG      I personally viewed and interpreted the patient's EKG/Telemetry data:    ECHOCARDIOGRAM:    Results for orders placed during the hospital encounter of 08/11/22    Adult Transthoracic Echo Limited W/ Cont if Necessary Per Protocol    Interpretation Summary  · Calculated left ventricular EF = 55% Estimated left ventricular EF was in agreement with the calculated left ventricular EF.  · Left ventricular diastolic function was not assessed.  · The right atrial cavity is borderline dilated.  · Moderate mitral valve regurgitation is present.          Assessment & Plan        Dyspnea on exertion    Elevated troponin/NSTEMI    COVID-19 virus detected    COPD (chronic obstructive pulmonary disease) (HCC)     Kidney transplant recipient       77-year-old woman with multiple cardiovascular risk factors presents with shortness of breath, dyspnea on exertion and chest pain.  She has nonspecific ST-T wave changes on her ECG however her troponin is elevated.  Echocardiogram shows preserved LV function however she has moderate mitral regurgitation  D-dimer is elevated, pulmonary embolism has been ruled out  Continue immunosuppressant  therapy for kidney transplant creatinine is normal,   Start aspirin, high intensity statin and heparin drip.  Cardiac catheterization shows dominant left system with no obstructive coronary disease.  There was heavy calcification of coronaries with diffuse nonobstructive disease  Troponin elevation is likely secondary to COVID-19 infection  Oxygen supplementation for respiratory failure/COVID-19.  Heparin drip can be stopped now.  Only 20 cc of contrast was used, IV fluids can also be stopped now.  Okay to discharge from cardiac standpoint.          Jak Gavin MD  08/12/22  18:38 EDT

## 2022-08-12 NOTE — CONSULTS
Nephrology Consult Note                                                [facility]      Patient Identification:  Name: Jaja Carcamo  Age: 77 y.o.  Sex: female  :  1945  MRN: [id number]               Requesting Physician: No admitting provider for patient encounter.  Reason for Consultation: management recommendations      History of Present Illness:    Patient is a 77-year-old female patient with history of kidney transplant about 5 years ago 2017 before that she was on dialysis for 7 years  She has not been followed by nephrology since transplant she only sees transplant surgeon and transplant coordinator maintained on immunosuppression medications and her creatinine has been normal  Patient is here with shortness of breath and now is determined that she needs cardiac catheterization I was asked to see her to protect her transplant kidney from the diet that she is going to receive with a cardiac catheterization  Problem List:  Patient Active Problem List   Diagnosis   • Right wrist fracture   • Dyspnea on exertion   • Elevated troponin   • COVID-19 virus detected   • COPD (chronic obstructive pulmonary disease) (HCC)   • NSTEMI (non-ST elevated myocardial infarction) (HCC)     Past Medical History:  Past Medical History:   Diagnosis Date   • Cancer (HCC)     lung   • COPD (chronic obstructive pulmonary disease) (HCC)    • History of appendectomy    • Hypertension    • Renal disease    • Hartman syndrome      Past Surgical History:  Past Surgical History:   Procedure Laterality Date   • BREAST SURGERY Left     cysts rmeoved   • CLOSED REDUCTION WRIST FRACTURE Right 3/1/2022    Procedure: WRIST CLOSED REDUCTION;  Surgeon: Gaudencio Townsend MD;  Location: [facility];  Service: Orthopedics;  Laterality: Right;   • CYSTOSCOPY     • HYSTERECTOMY     • LUNG LOBECTOMY Right    • TRANSPLANTATION RENAL        Home Meds:  Medications Prior to Admission   Medication Sig Dispense Refill Last Dose    • predniSONE (DELTASONE) 5 MG tablet Take 5 mg by mouth Daily.   8/11/2022 at Unknown time   • azaTHIOprine (IMURAN) 50 MG tablet Take 50 mg by mouth 2 (Two) Times a Day.      • diazePAM (VALIUM) 5 MG tablet Take 5 mg by mouth 2 (Two) Times a Day As Needed for Anxiety.      • furosemide (LASIX) 40 MG tablet Take 40 mg by mouth Daily.      • HYDROcodone-acetaminophen (NORCO) 7.5-325 MG per tablet Take 1 tablet by mouth Every 6 (Six) Hours As Needed for Moderate Pain .      • levothyroxine (SYNTHROID, LEVOTHROID) 50 MCG tablet Take 50 mcg by mouth Daily.      • metoprolol tartrate (LOPRESSOR) 50 MG tablet Take 50 mg by mouth Daily.      • potassium chloride (KLOR-CON) 20 MEQ packet Take 20 mEq by mouth Daily.      • simvastatin (ZOCOR) 20 MG tablet Take 20 mg by mouth Every Night.      • tacrolimus (PROGRAF) 1 MG capsule Take 1 mg by mouth 2 (Two) Times a Day.        Current Meds:     Current Facility-Administered Medications:   •  acetaminophen (TYLENOL) tablet 650 mg, 650 mg, Oral, Q4H PRN **OR** acetaminophen (TYLENOL) 160 MG/5ML solution 650 mg, 650 mg, Oral, Q4H PRN **OR** acetaminophen (TYLENOL) suppository 650 mg, 650 mg, Rectal, Q4H PRN, Tash Buckner, APRN  •  azaTHIOprine (IMURAN) tablet 50 mg, 50 mg, Oral, Q12H, Tash Buckner, APRN, 50 mg at 08/12/22 0232  •  heparin 86448 units/250 mL (100 units/mL) in 0.45 % NaCl infusion, 12 Units/kg/hr, Intravenous, Titrated, Tomas, Karina, APRN, Last Rate: 10.6 mL/hr at 08/12/22 0215, 13 Units/kg/hr at 08/12/22 0215  •  heparin bolus from bag 2,500 Units, 30 Units/kg, Intravenous, Q6H PRN, Cotton, Karina, APRN, 2,500 Units at 08/12/22 0214  •  heparin bolus from bag 4,900 Units, 60 Units/kg, Intravenous, Q6H PRN, Cotton, Karina, APRN  •  HYDROcodone-acetaminophen (NORCO) 7.5-325 MG per tablet 1 tablet, 1 tablet, Oral, Q6H PRN, Clyde White DO  •  ondansetron (ZOFRAN) tablet 4 mg, 4 mg, Oral, Q6H PRN **OR** ondansetron (ZOFRAN) injection 4 mg, 4 mg,  "Intravenous, Q6H PRN, Tash Buckner APRN, 4 mg at 08/12/22 0218  •  Pharmacy Consult - Remdesivir, , Does not apply, Continuous PRN, Tash Buckner APRN  •  [COMPLETED] remdesivir 200 mg in 290 mL NS, 200 mg, Intravenous, Q24H, 200 mg at 08/11/22 2030 **FOLLOWED BY** remdesivir 100 mg in sodium chloride 0.9 % 250 mL IVPB (powder vial), 100 mg, Intravenous, Q24H, Tash Buckner APRN  •  [COMPLETED] Insert peripheral IV, , , Once **AND** sodium chloride 0.9 % flush 10 mL, 10 mL, Intravenous, PRN, Karina Marin APRN  •  sodium chloride 0.9 % flush 10 mL, 10 mL, Intravenous, Q12H, Tash Buckner APRN, 10 mL at 08/12/22 0820  •  sodium chloride 0.9 % flush 10 mL, 10 mL, Intravenous, PRN, Tash Buckner APRN  •  sodium chloride 0.9 % infusion, 100 mL/hr, Intravenous, Continuous, Tash Buckner APRN, Last Rate: 100 mL/hr at 08/11/22 2030, 100 mL/hr at 08/11/22 2030  •  tacrolimus (PROGRAF) capsule 1 mg, 1 mg, Oral, Q12H, Tash Buckner APRN, 1 mg at 08/12/22 0223    Allergies:  Allergies   Allergen Reactions   • Zolpidem Other (See Comments), Unknown (See Comments) and Unknown - High Severity     KEPT HER AWAKE  KEPT HER AWAKE  KEPT HER AWAKE  KEPT HER AWAKE       Social History:   Social History     Tobacco Use   • Smoking status: Former Smoker   • Smokeless tobacco: Never Used   Substance Use Topics   • Alcohol use: Never      Family History:  Family History   Problem Relation Age of Onset   • No Known Problems Mother    • No Known Problems Father         Review of Systems  Reviewed 12 systems were reviewed, all negative except for those mentioned in HPI    Objective:  Vitals:   /55 (BP Location: Left arm, Patient Position: Lying)   Pulse 81   Temp (!) 100.9 °F (38.3 °C) (Oral) Comment: RN aware  Resp 18   Ht 170.2 cm (67\")   Wt 81.4 kg (179 lb 7.3 oz)   SpO2 97%   BMI 28.11 kg/m²   I/O:     Intake/Output Summary (Last 24 hours) at 8/12/2022 0912  Last data filed at 8/12/2022 0224  Gross per 24 hour   Intake 480 ml "   Output 300 ml   Net 180 ml       Exam:  General Appearance:  Alert no distress  Head:  Normocephalic, without obvious abnormality, atraumatic  Eyes:  PERRL, conjunctiva/corneas clear     Neck:  Supple,  no adenopathy;      Lungs:  Decreased BS occasion ronchi  Heart:  Regular rate and rhythm, S1 and S2 normal  Abdomen:  Soft, non-tender, bowel sounds active   Extremities: trace edema  Pulses: 2+ and symmetric all extremities  Skin:  No rashes or lesions  Data Review:  All labs (24hrs):   Recent Results (from the past 24 hour(s))   ECG 12 Lead    Collection Time: 08/11/22  1:08 PM   Result Value Ref Range    QT Interval 384 ms   Comprehensive Metabolic Panel    Collection Time: 08/11/22  4:14 PM    Specimen: Blood   Result Value Ref Range    Glucose 103 (H) 65 - 99 mg/dL    BUN 16 8 - 23 mg/dL    Creatinine 0.70 0.57 - 1.00 mg/dL    Sodium 140 136 - 145 mmol/L    Potassium 4.4 3.5 - 5.2 mmol/L    Chloride 104 98 - 107 mmol/L    CO2 28.0 22.0 - 29.0 mmol/L    Calcium 9.3 8.6 - 10.5 mg/dL    Total Protein 5.8 (L) 6.0 - 8.5 g/dL    Albumin 3.90 3.50 - 5.20 g/dL    ALT (SGPT) 34 (H) 1 - 33 U/L    AST (SGOT) 76 (H) 1 - 32 U/L    Alkaline Phosphatase 55 39 - 117 U/L    Total Bilirubin 1.4 (H) 0.0 - 1.2 mg/dL    Globulin 1.9 gm/dL    A/G Ratio 2.1 g/dL    BUN/Creatinine Ratio 22.9 7.0 - 25.0    Anion Gap 8.0 5.0 - 15.0 mmol/L    eGFR 90.3 >60.0 mL/min/1.73   BNP    Collection Time: 08/11/22  4:14 PM    Specimen: Blood   Result Value Ref Range    proBNP 14,786.0 (H) 0.0 - 1,800.0 pg/mL   Troponin    Collection Time: 08/11/22  4:14 PM    Specimen: Blood   Result Value Ref Range    Troponin T 0.486 (C) 0.000 - 0.030 ng/mL   CBC Auto Differential    Collection Time: 08/11/22  4:14 PM    Specimen: Blood   Result Value Ref Range    WBC 3.40 3.40 - 10.80 10*3/mm3    RBC 3.62 (L) 3.77 - 5.28 10*6/mm3    Hemoglobin 11.5 (L) 12.0 - 15.9 g/dL    Hematocrit 34.9 34.0 - 46.6 %    MCV 96.3 79.0 - 97.0 fL    MCH 31.7 26.6 - 33.0 pg     MCHC 32.9 31.5 - 35.7 g/dL    RDW 15.2 12.3 - 15.4 %    RDW-SD 52.1 37.0 - 54.0 fl    MPV 7.3 6.0 - 12.0 fL    Platelets 94 (L) 140 - 450 10*3/mm3    Neutrophil % 81.6 (H) 42.7 - 76.0 %    Lymphocyte % 7.9 (L) 19.6 - 45.3 %    Monocyte % 10.3 5.0 - 12.0 %    Eosinophil % 0.0 (L) 0.3 - 6.2 %    Basophil % 0.2 0.0 - 1.5 %    Neutrophils, Absolute 2.80 1.70 - 7.00 10*3/mm3    Lymphocytes, Absolute 0.30 (L) 0.70 - 3.10 10*3/mm3    Monocytes, Absolute 0.40 0.10 - 0.90 10*3/mm3    Eosinophils, Absolute 0.00 0.00 - 0.40 10*3/mm3    Basophils, Absolute 0.00 0.00 - 0.20 10*3/mm3    nRBC 0.3 (H) 0.0 - 0.2 /100 WBC   COVID-19,CEPHEID/JACK,COR/TWILA/PAD/DEVAN IN-HOUSE(OR EMERGENT/ADD-ON),NP SWAB IN TRANSPORT MEDIA 3-4 HR TAT, RT-PCR - Swab, Nasopharynx    Collection Time: 08/11/22  4:14 PM    Specimen: Nasopharynx; Swab   Result Value Ref Range    COVID19 Detected (C) Not Detected - Ref. Range   Gold Top - SST    Collection Time: 08/11/22  4:14 PM   Result Value Ref Range    Extra Tube hold    Protime-INR    Collection Time: 08/11/22  5:59 PM    Specimen: Blood   Result Value Ref Range    Protime 11.2 9.6 - 11.7 Seconds    INR 1.09 0.93 - 1.10   aPTT    Collection Time: 08/11/22  5:59 PM    Specimen: Blood   Result Value Ref Range    PTT 28.5 (L) 61.0 - 76.5 seconds   D-dimer, Quantitative    Collection Time: 08/11/22  5:59 PM    Specimen: Blood   Result Value Ref Range    D-Dimer, Quantitative 2.32 (H) 0.00 - 0.59 mg/L (FEU)   Troponin    Collection Time: 08/11/22 11:05 PM    Specimen: Blood   Result Value Ref Range    Troponin T 0.424 (C) 0.000 - 0.030 ng/mL   Hepatic Function Panel    Collection Time: 08/11/22 11:05 PM    Specimen: Blood   Result Value Ref Range    Total Protein 5.3 (L) 6.0 - 8.5 g/dL    Albumin 3.50 3.50 - 5.20 g/dL    ALT (SGPT) 33 1 - 33 U/L    AST (SGOT) 69 (H) 1 - 32 U/L    Alkaline Phosphatase 47 39 - 117 U/L    Total Bilirubin 1.2 0.0 - 1.2 mg/dL    Bilirubin, Direct 0.4 (H) 0.0 - 0.3 mg/dL    Bilirubin,  Indirect 0.8 mg/dL   Creatinine, Serum    Collection Time: 08/11/22 11:05 PM    Specimen: Blood   Result Value Ref Range    Creatinine 0.72 0.57 - 1.00 mg/dL    eGFR 87.3 >60.0 mL/min/1.73   aPTT    Collection Time: 08/11/22 11:05 PM    Specimen: Blood   Result Value Ref Range    PTT 59.5 (L) 61.0 - 76.5 seconds       Current Facility-Administered Medications:   •  acetaminophen (TYLENOL) tablet 650 mg, 650 mg, Oral, Q4H PRN **OR** acetaminophen (TYLENOL) 160 MG/5ML solution 650 mg, 650 mg, Oral, Q4H PRN **OR** acetaminophen (TYLENOL) suppository 650 mg, 650 mg, Rectal, Q4H PRN, Tash Buckner APRN  •  azaTHIOprine (IMURAN) tablet 50 mg, 50 mg, Oral, Q12H, Tash Buckner, APRN, 50 mg at 08/12/22 0232  •  heparin 99271 units/250 mL (100 units/mL) in 0.45 % NaCl infusion, 12 Units/kg/hr, Intravenous, Titrated, [place], Karina, APRN, Last Rate: 10.6 mL/hr at 08/12/22 0215, 13 Units/kg/hr at 08/12/22 0215  •  heparin bolus from bag 2,500 Units, 30 Units/kg, Intravenous, Q6H PRN, Tomas, Karina, APRN, 2,500 Units at 08/12/22 0214  •  heparin bolus from bag 4,900 Units, 60 Units/kg, Intravenous, Q6H PRN, [place], Karina, APRN  •  HYDROcodone-acetaminophen (NORCO) 7.5-325 MG per tablet 1 tablet, 1 tablet, Oral, Q6H PRN, Justin-Clyde Dorsey, DO  •  ondansetron (ZOFRAN) tablet 4 mg, 4 mg, Oral, Q6H PRN **OR** ondansetron (ZOFRAN) injection 4 mg, 4 mg, Intravenous, Q6H PRN, Tash Buckner, APRN, 4 mg at 08/12/22 0218  •  Pharmacy Consult - Remdesivir, , Does not apply, Continuous PRN, Tash Buckner APRN  •  [COMPLETED] remdesivir 200 mg in 290 mL NS, 200 mg, Intravenous, Q24H, 200 mg at 08/11/22 2030 **FOLLOWED BY** remdesivir 100 mg in sodium chloride 0.9 % 250 mL IVPB (powder vial), 100 mg, Intravenous, Q24H, Tash Buckner APRN  •  [COMPLETED] Insert peripheral IV, , , Once **AND** sodium chloride 0.9 % flush 10 mL, 10 mL, Intravenous, PRN, Karina Marin APRN  •  sodium chloride 0.9 % flush 10 mL, 10 mL, Intravenous, Q12H, [place],  TONYA Bianchi, 10 mL at 08/12/22 0820  •  sodium chloride 0.9 % flush 10 mL, 10 mL, Intravenous, PRN, Tash Buckner APRN  •  sodium chloride 0.9 % infusion, 100 mL/hr, Intravenous, Continuous, Tash Buckner APRN, Last Rate: 100 mL/hr at 08/11/22 2030, 100 mL/hr at 08/11/22 2030  •  tacrolimus (PROGRAF) capsule 1 mg, 1 mg, Oral, Q12H, Tash Buckner APRN, 1 mg at 08/12/22 0223    Assessment:  Kidney transplant with no signs of infection rejection or side effects   -Imuran and prednisone and Prograf  Shortness of breath  COVID-19 infection  Elevated troponin  COPD      Recommendations:  Patient is planned to have cardiac catheterization today  Patient is at low risk of nephrotoxicity  Added Mucomyst  Cannot add IV fluid due to volume status  Will monitor and follow with you  Continue immunosuppression medications      Natalie Pérze MD  8/12/2022  09:12 EDT

## 2022-08-12 NOTE — CASE MANAGEMENT/SOCIAL WORK
Continued Stay Note  DAHIANA Gay     Patient Name: Jaja Carcamo  MRN: [id number]  Today's Date: 8/12/2022    Admit Date: 8/11/2022     Discharge Plan     Row Name 08/12/22 1402       Plan    Plan COVID positive 8/11. Pending clinical course, pending PT/OT evaluations. Home O2 [facility] (3L), watch increased O2 needs.    Plan Comments COVID positive 8/11. Patient lives at home alone. Patient wears home O2 through [facility] (3L). Pending PT/OT evaluations for HH or rehab. D/C barriers: 8-12 heart cath, 3L NC, remdesivir, heparin gtt, cardiology, nephro following, pending PT/OT.              Phone communication or documentation only - no physical contact with patient or family.      Lindy Simmons RN

## 2022-08-12 NOTE — PLAN OF CARE
Problem: Adult Inpatient Plan of Care  Goal: Plan of Care Review  Outcome: Ongoing, Progressing  Flowsheets (Taken 8/12/2022 1139)  Outcome Evaluation: Patient to have heart cath this afternoon and verbalized understanding. No complaints of cp or soa this shift. No family at bedside at this time. Patient on 5L NC at this time.  Goal: Patient-Specific Goal (Individualized)  Outcome: Ongoing, Progressing  Goal: Absence of Hospital-Acquired Illness or Injury  Outcome: Ongoing, Progressing  Goal: Optimal Comfort and Wellbeing  Outcome: Ongoing, Progressing  Intervention: Provide Person-Centered Care  Recent Flowsheet Documentation  Taken 8/12/2022 0700 by Billie Chong, RN  Trust Relationship/Rapport:   care explained   choices provided   reassurance provided  Goal: Readiness for Transition of Care  Outcome: Ongoing, Progressing   Goal Outcome Evaluation:              Outcome Evaluation: Patient to have heart cath this afternoon and verbalized understanding. No complaints of cp or soa this shift. No family at bedside at this time. Patient on 5L NC at this time.

## 2022-08-12 NOTE — SIGNIFICANT NOTE
08/12/22 1009   Rehab Time/Intention   Session Not Performed patient/family declined evaluation  (getting ready for cardiac cath)   Recommendation   PT - Next Appointment 08/13/22

## 2022-08-13 LAB
ALBUMIN SERPL-MCNC: 3.1 G/DL (ref 3.5–5.2)
ALP SERPL-CCNC: 44 U/L (ref 39–117)
ALT SERPL W P-5'-P-CCNC: 27 U/L (ref 1–33)
ANION GAP SERPL CALCULATED.3IONS-SCNC: 7 MMOL/L (ref 5–15)
AST SERPL-CCNC: 45 U/L (ref 1–32)
BASOPHILS # BLD AUTO: 0 10*3/MM3 (ref 0–0.2)
BASOPHILS NFR BLD AUTO: 0.4 % (ref 0–1.5)
BILIRUB CONJ SERPL-MCNC: 0.3 MG/DL (ref 0–0.3)
BILIRUB INDIRECT SERPL-MCNC: 0.4 MG/DL
BILIRUB SERPL-MCNC: 0.7 MG/DL (ref 0–1.2)
BUN SERPL-MCNC: 13 MG/DL (ref 8–23)
BUN/CREAT SERPL: 20.3 (ref 7–25)
CALCIUM SPEC-SCNC: 9.4 MG/DL (ref 8.6–10.5)
CHLORIDE SERPL-SCNC: 106 MMOL/L (ref 98–107)
CO2 SERPL-SCNC: 28 MMOL/L (ref 22–29)
CREAT SERPL-MCNC: 0.64 MG/DL (ref 0.57–1)
DEPRECATED RDW RBC AUTO: 50.3 FL (ref 37–54)
EGFRCR SERPLBLD CKD-EPI 2021: 92.3 ML/MIN/1.73
EOSINOPHIL # BLD AUTO: 0 10*3/MM3 (ref 0–0.4)
EOSINOPHIL NFR BLD AUTO: 0.2 % (ref 0.3–6.2)
ERYTHROCYTE [DISTWIDTH] IN BLOOD BY AUTOMATED COUNT: 14.9 % (ref 12.3–15.4)
GLUCOSE SERPL-MCNC: 92 MG/DL (ref 65–99)
HCT VFR BLD AUTO: 33.2 % (ref 34–46.6)
HGB BLD-MCNC: 11 G/DL (ref 12–15.9)
HOLD SPECIMEN: NORMAL
LYMPHOCYTES # BLD AUTO: 0.4 10*3/MM3 (ref 0.7–3.1)
LYMPHOCYTES NFR BLD AUTO: 18.6 % (ref 19.6–45.3)
MAGNESIUM SERPL-MCNC: 1.7 MG/DL (ref 1.6–2.4)
MCH RBC QN AUTO: 31.7 PG (ref 26.6–33)
MCHC RBC AUTO-ENTMCNC: 33.2 G/DL (ref 31.5–35.7)
MCV RBC AUTO: 95.5 FL (ref 79–97)
MONOCYTES # BLD AUTO: 0.3 10*3/MM3 (ref 0.1–0.9)
MONOCYTES NFR BLD AUTO: 14.7 % (ref 5–12)
NEUTROPHILS NFR BLD AUTO: 1.5 10*3/MM3 (ref 1.7–7)
NEUTROPHILS NFR BLD AUTO: 66.1 % (ref 42.7–76)
NRBC BLD AUTO-RTO: 0.7 /100 WBC (ref 0–0.2)
PHOSPHATE SERPL-MCNC: 2.7 MG/DL (ref 2.5–4.5)
PLATELET # BLD AUTO: 85 10*3/MM3 (ref 140–450)
PMV BLD AUTO: 7.2 FL (ref 6–12)
POTASSIUM SERPL-SCNC: 3.8 MMOL/L (ref 3.5–5.2)
PROT SERPL-MCNC: 5 G/DL (ref 6–8.5)
RBC # BLD AUTO: 3.47 10*6/MM3 (ref 3.77–5.28)
SODIUM SERPL-SCNC: 141 MMOL/L (ref 136–145)
WBC NRBC COR # BLD: 2.2 10*3/MM3 (ref 3.4–10.8)

## 2022-08-13 PROCEDURE — 83735 ASSAY OF MAGNESIUM: CPT | Performed by: INTERNAL MEDICINE

## 2022-08-13 PROCEDURE — 63710000001 TACROLIMUS PER 1 MG: Performed by: INTERNAL MEDICINE

## 2022-08-13 PROCEDURE — 84100 ASSAY OF PHOSPHORUS: CPT | Performed by: INTERNAL MEDICINE

## 2022-08-13 PROCEDURE — 85025 COMPLETE CBC W/AUTO DIFF WBC: CPT | Performed by: INTERNAL MEDICINE

## 2022-08-13 PROCEDURE — 25010000002 AMIODARONE IN DEXTROSE 5% 360-4.14 MG/200ML-% SOLUTION: Performed by: INTERNAL MEDICINE

## 2022-08-13 PROCEDURE — 99232 SBSQ HOSP IP/OBS MODERATE 35: CPT | Performed by: INTERNAL MEDICINE

## 2022-08-13 PROCEDURE — 25010000002 ONDANSETRON PER 1 MG: Performed by: INTERNAL MEDICINE

## 2022-08-13 PROCEDURE — 63710000001 PREDNISONE PER 5 MG: Performed by: INTERNAL MEDICINE

## 2022-08-13 PROCEDURE — 36415 COLL VENOUS BLD VENIPUNCTURE: CPT | Performed by: INTERNAL MEDICINE

## 2022-08-13 PROCEDURE — 80076 HEPATIC FUNCTION PANEL: CPT | Performed by: INTERNAL MEDICINE

## 2022-08-13 PROCEDURE — 25010000002 AMIODARONE IN DEXTROSE 5% 150-4.21 MG/100ML-% SOLUTION: Performed by: INTERNAL MEDICINE

## 2022-08-13 PROCEDURE — 99232 SBSQ HOSP IP/OBS MODERATE 35: CPT | Performed by: HOSPITALIST

## 2022-08-13 PROCEDURE — 93010 ELECTROCARDIOGRAM REPORT: CPT | Performed by: INTERNAL MEDICINE

## 2022-08-13 PROCEDURE — 97162 PT EVAL MOD COMPLEX 30 MIN: CPT

## 2022-08-13 PROCEDURE — 80048 BASIC METABOLIC PNL TOTAL CA: CPT | Performed by: INTERNAL MEDICINE

## 2022-08-13 PROCEDURE — 93005 ELECTROCARDIOGRAM TRACING: CPT | Performed by: HOSPITALIST

## 2022-08-13 PROCEDURE — 25010000002 REMDESIVIR 100 MG/20ML SOLUTION 1 EACH VIAL: Performed by: INTERNAL MEDICINE

## 2022-08-13 PROCEDURE — 63710000001 AZATHIOPRINE PER 50 MG: Performed by: INTERNAL MEDICINE

## 2022-08-13 RX ORDER — LOPERAMIDE HYDROCHLORIDE 2 MG/1
4 CAPSULE ORAL 4 TIMES DAILY PRN
Status: DISCONTINUED | OUTPATIENT
Start: 2022-08-13 | End: 2022-08-16 | Stop reason: HOSPADM

## 2022-08-13 RX ADMIN — PREDNISONE 10 MG: 10 TABLET ORAL at 08:14

## 2022-08-13 RX ADMIN — ONDANSETRON 4 MG: 2 INJECTION INTRAMUSCULAR; INTRAVENOUS at 02:11

## 2022-08-13 RX ADMIN — APIXABAN 5 MG: 5 TABLET, FILM COATED ORAL at 11:25

## 2022-08-13 RX ADMIN — Medication 600 MG: at 08:13

## 2022-08-13 RX ADMIN — TACROLIMUS 1 MG: 0.5 CAPSULE ORAL at 08:13

## 2022-08-13 RX ADMIN — ONDANSETRON 4 MG: 2 INJECTION INTRAMUSCULAR; INTRAVENOUS at 16:53

## 2022-08-13 RX ADMIN — ONDANSETRON 4 MG: 2 INJECTION INTRAMUSCULAR; INTRAVENOUS at 21:28

## 2022-08-13 RX ADMIN — METOPROLOL SUCCINATE 50 MG: 50 TABLET, EXTENDED RELEASE ORAL at 08:14

## 2022-08-13 RX ADMIN — DIAZEPAM 5 MG: 5 TABLET ORAL at 21:27

## 2022-08-13 RX ADMIN — REMDESIVIR 100 MG: 100 INJECTION, POWDER, LYOPHILIZED, FOR SOLUTION INTRAVENOUS at 21:31

## 2022-08-13 RX ADMIN — AZATHIOPRINE 50 MG: 50 TABLET ORAL at 08:14

## 2022-08-13 RX ADMIN — DULOXETINE 40 MG: 20 CAPSULE, DELAYED RELEASE ORAL at 08:14

## 2022-08-13 RX ADMIN — AZATHIOPRINE 50 MG: 50 TABLET ORAL at 21:29

## 2022-08-13 RX ADMIN — AMIODARONE HYDROCHLORIDE 1 MG/MIN: 1.8 INJECTION, SOLUTION INTRAVENOUS at 10:51

## 2022-08-13 RX ADMIN — ATORVASTATIN CALCIUM 10 MG: 10 TABLET, FILM COATED ORAL at 23:18

## 2022-08-13 RX ADMIN — AMIODARONE HYDROCHLORIDE 1 MG/MIN: 1.8 INJECTION, SOLUTION INTRAVENOUS at 15:35

## 2022-08-13 RX ADMIN — AMIODARONE HYDROCHLORIDE 0.5 MG/MIN: 1.8 INJECTION, SOLUTION INTRAVENOUS at 16:53

## 2022-08-13 RX ADMIN — AMIODARONE HYDROCHLORIDE 150 MG: 1.5 INJECTION, SOLUTION INTRAVENOUS at 10:50

## 2022-08-13 RX ADMIN — HYDROCODONE BITARTRATE AND ACETAMINOPHEN 1 TABLET: 7.5; 325 TABLET ORAL at 09:52

## 2022-08-13 RX ADMIN — Medication 600 MG: at 21:29

## 2022-08-13 RX ADMIN — HYDROCODONE BITARTRATE AND ACETAMINOPHEN 1 TABLET: 7.5; 325 TABLET ORAL at 21:29

## 2022-08-13 RX ADMIN — Medication 10 ML: at 08:13

## 2022-08-13 RX ADMIN — APIXABAN 5 MG: 5 TABLET, FILM COATED ORAL at 21:30

## 2022-08-13 RX ADMIN — Medication 10 ML: at 21:30

## 2022-08-13 RX ADMIN — LEVOTHYROXINE SODIUM 100 MCG: 0.1 TABLET ORAL at 06:10

## 2022-08-13 RX ADMIN — TACROLIMUS 1 MG: 0.5 CAPSULE ORAL at 21:25

## 2022-08-13 NOTE — PROGRESS NOTES
LOS: 2 days   Admiting Physician- Clyde White,*    Reason For Followup:    COVID-19 infection  Elevated troponin  Shortness of breath  Atrial fibrillation      Subjective     Mild shortness of breath    Objective     Hemodynamics are stable but patient went into atrial fibrillation    Review of Systems:   Review of Systems   Constitutional: Negative for chills and fever.   HENT: Negative for ear discharge and nosebleeds.    Eyes: Negative for discharge and redness.   Cardiovascular: Positive for palpitations. Negative for chest pain, orthopnea, paroxysmal nocturnal dyspnea and syncope.   Respiratory: Positive for shortness of breath. Negative for cough and wheezing.    Endocrine: Negative for heat intolerance.   Skin: Negative for rash.   Musculoskeletal: Negative for arthritis and myalgias.   Gastrointestinal: Negative for abdominal pain, melena, nausea and vomiting.   Genitourinary: Negative for dysuria and hematuria.   Neurological: Negative for dizziness, light-headedness, numbness and tremors.   Psychiatric/Behavioral: Negative for depression. The patient is not nervous/anxious.          Vital Signs  Vitals:    08/13/22 0134 08/13/22 0612 08/13/22 0901 08/13/22 1312   BP: 112/48 131/61 129/68 136/61   BP Location: Left arm Left arm Left arm Left arm   Patient Position: Lying Lying Lying Lying   Pulse: 82 (!) 121 117 68   Resp: 22 20 20 20   Temp: 98 °F (36.7 °C) 98.2 °F (36.8 °C) 98.2 °F (36.8 °C) 96.8 °F (36 °C)   TempSrc: Oral Oral Oral Oral   SpO2: 94% 93% 94% 94%   Weight:       Height:         Wt Readings from Last 1 Encounters:   08/12/22 81.4 kg (179 lb 7.3 oz)       Intake/Output Summary (Last 24 hours) at 8/13/2022 1508  Last data filed at 8/13/2022 0859  Gross per 24 hour   Intake --   Output 550 ml   Net -550 ml     Physical Exam:  Physical Exam    Results Review:   Lab Results (last 24 hours)     Procedure Component Value Units Date/Time    Extra Tubes [[id number]] Collected: 08/13/22  0646    Specimen: Blood, Venous Line Updated: 08/13/22 0748    Narrative:      The following orders were created for panel order Extra Tubes.  Procedure                               Abnormality         Status                     ---------                               -----------         ------                     Gold Top - SST[[id number]]                                   Final result                 Please view results for these tests on the individual orders.    Gold Top - SST [[id number]] Collected: 08/13/22 0646    Specimen: Blood Updated: 08/13/22 0748     Extra Tube Hold for add-ons.     Comment: Auto resulted.       Basic Metabolic Panel [[id number]]  (Normal) Collected: 08/13/22 0646    Specimen: Blood Updated: 08/13/22 0718     Glucose 92 mg/dL      BUN 13 mg/dL      Creatinine 0.64 mg/dL      Sodium 141 mmol/L      Potassium 3.8 mmol/L      Chloride 106 mmol/L      CO2 28.0 mmol/L      Calcium 9.4 mg/dL      BUN/Creatinine Ratio 20.3     Anion Gap 7.0 mmol/L      eGFR 92.3 mL/min/1.73      Comment: National Kidney Foundation and American Society of Nephrology (ASN) Task Force recommended calculation based on the Chronic Kidney Disease Epidemiology Collaboration (CKD-EPI) equation refit without adjustment for race.       Narrative:      GFR Normal >60  Chronic Kidney Disease <60  Kidney Failure <15      Hepatic Function Panel [[id number]]  (Abnormal) Collected: 08/13/22 0646    Specimen: Blood Updated: 08/13/22 0718     Total Protein 5.0 g/dL      Albumin 3.10 g/dL      ALT (SGPT) 27 U/L      AST (SGOT) 45 U/L      Alkaline Phosphatase 44 U/L      Total Bilirubin 0.7 mg/dL      Bilirubin, Direct 0.3 mg/dL      Bilirubin, Indirect 0.4 mg/dL     Phosphorus [[id number]]  (Normal) Collected: 08/13/22 0353    Specimen: Blood Updated: 08/13/22 0446     Phosphorus 2.7 mg/dL     Magnesium [[id number]]  (Normal) Collected: 08/13/22 0353    Specimen: Blood Updated: 08/13/22 0446     Magnesium 1.7 mg/dL     CBC &  Differential [[id number]]  (Abnormal) Collected: 08/13/22 0353    Specimen: Blood Updated: 08/13/22 0436    Narrative:      The following orders were created for panel order CBC & Differential.  Procedure                               Abnormality         Status                     ---------                               -----------         ------                     CBC Auto Differential[[id number]]        Abnormal            Final result                 Please view results for these tests on the individual orders.    CBC Auto Differential [[id number]]  (Abnormal) Collected: 08/13/22 0353    Specimen: Blood Updated: 08/13/22 0436     WBC 2.20 10*3/mm3      RBC 3.47 10*6/mm3      Hemoglobin 11.0 g/dL      Hematocrit 33.2 %      MCV 95.5 fL      MCH 31.7 pg      MCHC 33.2 g/dL      RDW 14.9 %      RDW-SD 50.3 fl      MPV 7.2 fL      Platelets 85 10*3/mm3      Neutrophil % 66.1 %      Lymphocyte % 18.6 %      Monocyte % 14.7 %      Eosinophil % 0.2 %      Basophil % 0.4 %      Neutrophils, Absolute 1.50 10*3/mm3      Lymphocytes, Absolute 0.40 10*3/mm3      Monocytes, Absolute 0.30 10*3/mm3      Eosinophils, Absolute 0.00 10*3/mm3      Basophils, Absolute 0.00 10*3/mm3      nRBC 0.7 /100 WBC         Imaging Results (Last 72 Hours)     Procedure Component Value Units Date/Time    CT Angiogram Chest Pulmonary Embolism [[id number]] Collected: 08/12/22 0124     Updated: 08/12/22 0130    Narrative:      CT ANGIOGRAM CHEST WITH IV CONTRAST       INDICATION: Shortness of breath and elevated d-dimer. History of lobectomy for lung cancer.    COMPARISON: 4/1/2015    PROCEDURE: Multiple axial CT images were obtained of the chest after IV administration of 84 mL of Isovue-370.  Coronal and sagittal reformations as well as MIP images were obtained.  CT dose lowering techniques were used, to include: automated exposure   control, adjustment for patient size, and or use of iterative reconstruction.    FINDINGS:    Cardiovascular: No  filling defects are seen in the pulmonary arteries. No focal aortic aneurysm or evidence of aortic dissection is seen.    Mediastinum/Torie: No significant mediastinal or hilar adenopathy is seen.    Lungs/Pleura :  Severe centrilobular emphysema. There has been a right lobectomy. It may have been the lower lobe of the exact point is difficult to say. There is suture material in the right lung base. There are small pleural effusions.    Chest wall and Axilla: Unremarkable.    Bones:  No acute focal bony abnormality seen.    Upper abdomen: Severe renal atrophy.      Impression:      1. No pulmonary embolus.  2. Small pleural effusions.  3. Severe centrilobular emphysema but no airspace consolidation.  4. Right lobectomy.  5. Severe renal atrophy    Electronically signed by:  Zach Zendejas M.D.    8/11/2022 11:29 PM    XR Chest 1 View [[id number]] Collected: 08/11/22 1627     Updated: 08/11/22 1630    Narrative:      DATE OF EXAM:  8/11/2022 4:17 PM     PROCEDURE:  XR CHEST 1 VW-     INDICATIONS:  soa       COMPARISON:  PA and lateral chest 5/17/2016     TECHNIQUE:   Single radiographic view of the chest was obtained.     FINDINGS:  Heart size is moderately enlarged. There are small layering bilateral  pleural effusions. Increased interstitial thickening in both lungs  suggest the presence of pulmonary edema. There is probable mild linear  left basilar atelectasis. Benign calcified granuloma in the left upper  lobe. No acute osseous abnormality.       Impression:      Moderate cardiomegaly with features of interstitial edema and small  bilateral pleural effusions. Probable mild left basilar atelectasis.     Electronically Signed By-Karie Bustos MD On:8/11/2022 4:28 PM  This report was finalized on [id number] by  Karie Bustos MD.        ECG/EMG Results (most recent)     Procedure Component Value Units Date/Time    ECG 12 Lead [[id number]] Collected: 08/11/22 1308     Updated: 08/12/22 1646     QT Interval  384 ms     Narrative:      HEART RATE= 108  bpm  RR Interval= 556  ms  NM Interval= 209  ms  P Horizontal Axis= -49  deg  P Front Axis= 46  deg  QRSD Interval= 75  ms  QT Interval= 384  ms  QRS Axis= -26  deg  T Wave Axis= 16  deg  - ABNORMAL ECG -  Sinus tachycardia  Borderline prolonged NM interval  Prolonged QT interval  When compared with ECG of 01-Mar-2022 11:57:47,  Significant rate increase  New conduction abnormality  Significant repolarization change  Significant axis, voltage or hypertrophy change  Electronically Signed By: Maycol Campoverde (TWILA) 12-Aug-2022 16:46:12  Date and Time of Study: 2022-08-11 13:08:10    Adult Transthoracic Echo Limited W/ Cont if Necessary Per Protocol [[id number]] Resulted: 08/12/22 1732     Updated: 08/12/22 1737     Target HR (85%) 123 bpm      Max. Pred. HR (100%) 145 bpm      EF(MOD-bp) 55 %     Narrative:      · Calculated left ventricular EF = 55% Estimated left ventricular EF was   in agreement with the calculated left ventricular EF.  · Left ventricular diastolic function was not assessed.  · The right atrial cavity is borderline dilated.  · Moderate mitral valve regurgitation is present.       ECG 12 Lead [[id number]] Collected: 08/13/22 0840     Updated: 08/13/22 0840     QT Interval 333 ms     Narrative:      HEART RATE= 121  bpm  RR Interval= 494  ms  NM Interval=   ms  P Horizontal Axis=   deg  P Front Axis=   deg  QRSD Interval= 75  ms  QT Interval= 333  ms  QRS Axis= -32  deg  T Wave Axis= 83  deg  - ABNORMAL ECG -  Atrial fibrillation  Ventricular premature complex  Left axis deviation  Nonspecific repol abnormality, diffuse leads  Electronically Signed By:   Date and Time of Study: 2022-08-13 08:40:07        CBC    Results from last 7 days   Lab Units 08/13/22  0353 08/11/22  1614   WBC 10*3/mm3 2.20* 3.40   HEMOGLOBIN g/dL 11.0* 11.5*   PLATELETS 10*3/mm3 85* 94*     BMP   Results from last 7 days   Lab Units 08/13/22  0646 08/13/22  0353 08/12/22  0964  08/11/22  2305 08/11/22  1614 08/08/22  1603   SODIUM mmol/L 141  --   --   --  140  --    POTASSIUM mmol/L 3.8  --   --   --  4.4  --    CHLORIDE mmol/L 106  --   --   --  104  --    CO2 mmol/L 28.0  --   --   --  28.0  --    BUN mg/dL 13  --   --   --  16  --    CREATININE mg/dL 0.64  --  0.69 0.72 0.70  --    GLUCOSE mg/dL 92  --   --   --  103*  --    MAGNESIUM mg/dL  --  1.7  --   --   --  1.7   PHOSPHORUS mg/dL  --  2.7  --   --   --   --      CMP   Results from last 7 days   Lab Units 08/13/22  0646 08/12/22  0933 08/11/22  2305 08/11/22  1614   SODIUM mmol/L 141  --   --  140   POTASSIUM mmol/L 3.8  --   --  4.4   CHLORIDE mmol/L 106  --   --  104   CO2 mmol/L 28.0  --   --  28.0   BUN mg/dL 13  --   --  16   CREATININE mg/dL 0.64 0.69 0.72 0.70   GLUCOSE mg/dL 92  --   --  103*   ALBUMIN g/dL 3.10* 3.80 3.50 3.90   BILIRUBIN mg/dL 0.7 1.2 1.2 1.4*   ALK PHOS U/L 44 53 47 55   AST (SGOT) U/L 45* 74* 69* 76*   ALT (SGPT) U/L 27 40* 33 34*     Cardiac Studies:  Echo- Results for orders placed during the hospital encounter of 08/11/22    Adult Transthoracic Echo Limited W/ Cont if Necessary Per Protocol    Interpretation Summary  · Calculated left ventricular EF = 55% Estimated left ventricular EF was in agreement with the calculated left ventricular EF.  · Left ventricular diastolic function was not assessed.  · The right atrial cavity is borderline dilated.  · Moderate mitral valve regurgitation is present.    Stress Myoview-  Cath-      Medication Review:   Scheduled Meds:Acetylcysteine, 600 mg, Oral, BID  apixaban, 5 mg, Oral, Q12H  atorvastatin, 10 mg, Oral, Daily  azaTHIOprine, 50 mg, Oral, BID  DULoxetine, 40 mg, Oral, Daily  levothyroxine, 100 mcg, Oral, Once per day on Sun Sat  levothyroxine, 50 mcg, Oral, Daily  metoprolol succinate XL, 50 mg, Oral, Daily  potassium chloride, 20 mEq, Oral, Daily  predniSONE, 10 mg, Oral, Daily  [START ON 8/17/2022] predniSONE, 5 mg, Oral, Daily With  Breakfast  remdesivir, 100 mg, Intravenous, Q24H  sodium chloride, 10 mL, Intravenous, Q12H  tacrolimus, 1 mg, Oral, BID      Continuous Infusions:amiodarone, 1 mg/min, Last Rate: 1 mg/min (08/13/22 1051)   Followed by  amiodarone, 0.5 mg/min  Pharmacy Consult - Remdesivir,       PRN Meds:.•  acetaminophen **OR** acetaminophen **OR** acetaminophen  •  acetaminophen  •  albuterol sulfate HFA  •  diazePAM  •  diphenhydrAMINE  •  HYDROcodone-acetaminophen  •  loperamide  •  ondansetron **OR** ondansetron  •  ondansetron **OR** ondansetron  •  Pharmacy Consult - Remdesivir  •  [COMPLETED] Insert peripheral IV **AND** sodium chloride  •  sodium chloride      Assessment & Plan   Patient Active Problem List   Diagnosis   • Right wrist fracture   • Dyspnea on exertion   • Elevated troponin   • COVID-19 virus detected   • COPD (chronic obstructive pulmonary disease) ([facility])   • NSTEMI (non-ST elevated myocardial infarction) ([facility])     MDM:    1.  Atrial fibrillation:    Patient went back into atrial fibrillation I would recommend to start intravenous amiodarone.    2.  COVID-19 infection:    Patient is on prednisone and remdesivir continue current treatment    3.  Elevated troponin:    Patient underwent cardiac catheterization and nonobstructive CAD noted    Elliot Gomes MD  08/13/22  15:08 EDT

## 2022-08-13 NOTE — PLAN OF CARE
Goal Outcome Evaluation:  Patient is alert ad oriented, able to make needs known to staff. Patient had complaints of pain, PRN medications were effective. Will continue to monitor.

## 2022-08-13 NOTE — NURSING NOTE
Patient noted to have converted to A fib. Cardiac MD notified, will round and see patient. No new orders at this time. Will continue to monitor.

## 2022-08-13 NOTE — THERAPY EVALUATION
Patient Name: Jaja Carcamo  : 1945    MRN: [id number]                              Today's Date: 2022       Admit Date: 2022    Visit Dx:     ICD-10-CM ICD-9-CM   1. Dyspnea on exertion  R06.00 786.09   2. NSTEMI (non-ST elevated myocardial infarction) (Formerly [facility])  I21.4 410.70   3. Pleural effusion, bilateral  J90 511.9   4. Elevated troponin  R77.8 790.6     Patient Active Problem List   Diagnosis   • Right wrist fracture   • Dyspnea on exertion   • Elevated troponin   • COVID-19 virus detected   • COPD (chronic obstructive pulmonary disease) (Formerly [facility])   • NSTEMI (non-ST elevated myocardial infarction) (Formerly [facility])     Past Medical History:   Diagnosis Date   • Cancer (HCC)     lung   • COPD (chronic obstructive pulmonary disease) (HCC)    • History of appendectomy    • Hypertension    • Renal disease    • Hartman syndrome      Past Surgical History:   Procedure Laterality Date   • BREAST SURGERY Left     cysts rmeoved   • CLOSED REDUCTION WRIST FRACTURE Right 3/1/2022    Procedure: WRIST CLOSED REDUCTION;  Surgeon: Gaudencio Townsend MD;  Location: [facility];  Service: Orthopedics;  Laterality: Right;   • CYSTOSCOPY     • HYSTERECTOMY     • LUNG LOBECTOMY Right    • TRANSPLANTATION RENAL        General Information     Row Name 22 153          Physical Therapy Time and Intention    Document Type evaluation  -     Mode of Treatment physical therapy  -     Row Name 22 1530          General Information    Prior Level of Function independent:;all household mobility;gait;transfer;bed mobility  Son or grandaughter provide transportation. Did not utilize AD  -BETTY     Existing Precautions/Restrictions fall;oxygen therapy device and L/min;other (see comments)  3L, chronically  -     Row Name 22 1530          Living Environment    People in Home alone  -     Row Name 22 1530          Home Main Entrance    Number of Stairs, Main Entrance none  -     Row Name 22 1530          Stairs  Within Home, Primary    Number of Stairs, Within Home, Primary none  -     Row Name 08/13/22 1530          Cognition    Orientation Status (Cognition) oriented x 4  -BETTY     Row Name 08/13/22 1530          Safety Issues, Functional Mobility    Impairments Affecting Function (Mobility) balance;endurance/activity tolerance;shortness of breath  -           User Key  (r) = Recorded By, (t) = Taken By, (c) = Cosigned By    Initials Name Provider Type    Johanny Bowser, CLARKE Physical Therapist               Mobility     Row Name 08/13/22 1531          Bed Mobility    Bed Mobility supine-sit  -     Supine-Sit [place] (Bed Mobility) standby assist;verbal cues  -     Assistive Device (Bed Mobility) bed rails;head of bed elevated  -     Comment, (Bed Mobility) HOB minimally elevated  -     Row Name 08/13/22 1531          Sit-Stand Transfer    Sit-Stand [place] (Transfers) standby assist  -     Comment, (Sit-Stand Transfer) STS from EOB  -[facility] Name 08/13/22 1531          Gait/Stairs (Locomotion)    [place] Level (Gait) contact guard  -     Distance in Feet (Gait) Pt takes 4-5 steps to the bedside chair w/ minimal trunk instability noted. No LOB.  -           User Key  (r) = Recorded By, (t) = Taken By, (c) = Cosigned By    Initials Name Provider Type    Johanny Bowser, CLARKE Physical Therapist               Obj/Interventions     Row Name 08/13/22 1533          Range of Motion Comprehensive    General Range of Motion no range of motion deficits identified  -BETTY     Row Name 08/13/22 1533          Strength Comprehensive (MMT)    Comment, General Manual Muscle Testing (MMT) Assessment BUE grossly 4/5, BLE grossly 4-/5  -     Row Name 08/13/22 1533          Balance    Balance Assessment sitting static balance;sitting dynamic balance;standing static balance;standing dynamic balance  -     Static Sitting Balance independent  -     Dynamic Sitting Balance independent  -     Position,  Sitting Balance sitting edge of bed  -     Static Standing Balance supervision  -     Dynamic Standing Balance standby assist  -     Position/Device Used, Standing Balance unsupported  -[facility] Name 08/13/22 1533          Sensory Assessment (Somatosensory)    Sensory Assessment (Somatosensory) sensation intact  -           User Key  (r) = Recorded By, (t) = Taken By, (c) = Cosigned By    Initials Name Provider Type    Johanny Bowser, PT Physical Therapist               Goals/Plan     [facility] Name 08/13/22 1536          Bed Mobility Goal 1 (PT)    Activity/Assistive Device (Bed Mobility Goal 1, PT) bed mobility activities, all  -BETTY     [place] Level/Cues Needed (Bed Mobility Goal 1, PT) independent  -BETTY     Time Frame (Bed Mobility Goal 1, PT) long term goal (LTG);2 weeks  -[facility] Name 08/13/22 1536          Transfer Goal 1 (PT)    Activity/Assistive Device (Transfer Goal 1, PT) sit-to-stand/stand-to-sit;bed-to-chair/chair-to-bed  -BETTY     [place] Level/Cues Needed (Transfer Goal 1, PT) independent  -BETTY     Time Frame (Transfer Goal 1, PT) long term goal (LTG);2 weeks  -[facility] Name 08/13/22 1536          Gait Training Goal 1 (PT)    Activity/Assistive Device (Gait Training Goal 1, PT) gait (walking locomotion)  -BETTY     [place] Level (Gait Training Goal 1, PT) independent  -BETTY     Distance (Gait Training Goal 1, PT) 150ft  -BETTY     Time Frame (Gait Training Goal 1, PT) long term goal (LTG);2 weeks  -BETTY     Row Name 08/13/22 1536          Therapy Assessment/Plan (PT)    Planned Therapy Interventions (PT) balance training;bed mobility training;gait training;home exercise program;neuromuscular re-education;patient/family education;strengthening;stair training;transfer training  -           User Key  (r) = Recorded By, (t) = Taken By, (c) = Cosigned By    Initials Name Provider Type    Johanny Bowser, PT Physical Therapist               Clinical Impression     Row Name 08/13/22 1538           Pain    Additional Documentation Pain Scale: FACES Pre/Post-Treatment (Group)  -BETTY     Row Name 08/13/22 1533          Pain Scale: FACES Pre/Post-Treatment    Pain: FACES Scale, Pretreatment 4-->hurts little more  -BETTY     Posttreatment Pain Rating 4-->hurts little more  -BETTY     Pain Location generalized  -BETTY     Pre/Posttreatment Pain Comment Nursing to administer pain medication soon  -BETTY     Row Name 08/13/22 1533          Plan of Care Review    Plan of Care Reviewed With patient  -     Outcome Evaluation Pt is a 78 y/o female who presents to [facility] w/ reports of SOA worsening for the past week and chest pain. PMH significant for lung CA w/ resection of the R lower lung w/out chemotherapy or radiation, s/p renal transplant around 2017, on 3L chronic O2. CT (-) for PE, COVID+. Pt diagnosed with NSTEMI, dyspnea on exertion, and pleural effusion. At [facility] pt lives alone in a SSH with no steps, does not drive, and does not utilize an AD. She recently fell in March, fracturing her R wrist. At this time pt presents with decreased activity tolerance, decreased BLE strength, and impaired dynamic standing balance. She would benefit from use of RW at this time. Pt was unable to ambulate further this date secondary to fluctuating heart rate (110-low 140). Pending pt progress she would benefit from home with family assist and HHPT, but d/t COVID pt may not have access to HHPT. PT will continue to follow and assess 5x/week.  -BETTY     Row Name 08/13/22 1533          Therapy Assessment/Plan (PT)    Therapy Frequency (PT) 5 times/wk  -     Predicted Duration of Therapy Intervention (PT) Until d/c  -BETTY     Row Name 08/13/22 1533          Vital Signs    Pre SpO2 (%) 95  -BETTY     O2 Delivery Pre Treatment supplemental O2  -BETTY     Intra SpO2 (%) 93  -BETTY     O2 Delivery Intra Treatment supplemental O2  -BETTY     Post SpO2 (%) 96  -BETTY     O2 Delivery Post Treatment supplemental O2  -     Row Name 08/13/22 1533           Positioning and Restraints    Pre-Treatment Position in bed  -BETTY     Post Treatment Position chair  -BETTY     In Chair reclined;call light within reach;encouraged to call for assist;exit alarm on;notified nsg  -BETTY           User Key  (r) = Recorded By, (t) = Taken By, (c) = Cosigned By    Initials Name Provider Type    Johanny Bowser PT Physical Therapist               Outcome Measures     Row Name 08/13/22 1536          How much help from another person do you currently need...    Turning from your back to your side while in flat bed without using bedrails? 3  -BETTY     Moving from lying on back to sitting on the side of a flat bed without bedrails? 3  -BETTY     Moving to and from a bed to a chair (including a wheelchair)? 3  -BETTY     Standing up from a chair using your arms (e.g., wheelchair, bedside chair)? 3  -BETTY     Climbing 3-5 steps with a railing? 3  -BETTY     To walk in hospital room? 3  -BETTY     AM-PAC 6 Clicks Score (PT) 18  -BETTY     Highest level of mobility 6 --> Walked 10 steps or more  -BETTY     Row Name 08/13/22 1536          Functional Assessment    Outcome Measure Options AM-PAC 6 Clicks Basic Mobility (PT)  -           User Key  (r) = Recorded By, (t) = Taken By, (c) = Cosigned By    Initials Name Provider Type    Johanny Bowser PT Physical Therapist                             Physical Therapy Education                 Title: PT OT SLP Therapies (In Progress)     Topic: Physical Therapy (In Progress)     Point: Mobility training (Done)     Learning Progress Summary           Patient Acceptance, E,TB, VU by BETTY at 8/13/2022 1537                   Point: Home exercise program (Not Started)     Learner Progress:  Not documented in this visit.          Point: Body mechanics (Done)     Learning Progress Summary           Patient Acceptance, E,TB, VU by BETTY at 8/13/2022 1537                   Point: Precautions (Done)     Learning Progress Summary           Patient Acceptance, E,TB, VU by BETTY at 8/13/2022  1537                               User Key     Initials Effective Dates Name Provider Type Discipline     08/23/21 -  Johanny Hawk, CLARKE Physical Therapist PT              PT Recommendation and Plan  Planned Therapy Interventions (PT): balance training, bed mobility training, gait training, home exercise program, neuromuscular re-education, patient/family education, strengthening, stair training, transfer training  Plan of Care Reviewed With: patient  Outcome Evaluation: Pt is a 76 y/o female who presents to [facility] w/ reports of SOA worsening for the past week and chest pain. PMH significant for lung CA w/ resection of the R lower lung w/out chemotherapy or radiation, s/p renal transplant around 2017, on 3L chronic O2. CT (-) for PE, COVID+. Pt diagnosed with NSTEMI, dyspnea on exertion, and pleural effusion. At [facility] pt lives alone in a SSH with no steps, does not drive, and does not utilize an AD. She recently fell in March, fracturing her R wrist. At this time pt presents with decreased activity tolerance, decreased BLE strength, and impaired dynamic standing balance. She would benefit from use of RW at this time. Pt was unable to ambulate further this date secondary to fluctuating heart rate (110-low 140). Pending pt progress she would benefit from home with family assist and HHPT, but d/t COVID pt may not have access to HHPT. PT will continue to follow and assess 5x/week.     Time Calculation:    PT Charges     Row Name 08/13/22 1537             Time Calculation    Start Time 0858  -      Stop Time 0920  -      Time Calculation (min) 22 min  -BETTY      PT Received On 08/13/22  -BETTY      PT - Next Appointment 08/15/22  -      PT Goal Re-Cert Due Date 08/27/22  -            User Key  (r) = Recorded By, (t) = Taken By, (c) = Cosigned By    Initials Name Provider Type    BETTY Johanny Hawk, CLARKE Physical Therapist              Therapy Charges for Today     Code Description Service Date Service Provider Modifiers  Qty    27924470671 HC PT EVAL MOD COMPLEXITY 4 8/13/2022 Johanny Hawk, PT GP 1          PT G-Codes  Outcome Measure Options: AM-PAC 6 Clicks Basic Mobility (PT)  AM-PAC 6 Clicks Score (PT): 18    Johanny Hawk, PT  8/13/2022

## 2022-08-13 NOTE — PROGRESS NOTES
"                                                                                                                                      Nephrology  Progress Note                                        [facility]    Patient Identification    Name: Jaja Carcamo  Age: 77 y.o.  Sex: female  :  1945  MRN: [id number]      DATE OF SERVICE:  2022        Subective    No new complaints  Had cardiac catheterization yesterday with no intervention needed     Objective   Scheduled Meds:Acetylcysteine, 600 mg, Oral, BID  atorvastatin, 10 mg, Oral, Daily  azaTHIOprine, 50 mg, Oral, BID  DULoxetine, 40 mg, Oral, Daily  levothyroxine, 100 mcg, Oral, Once per day on Sun Sat  levothyroxine, 50 mcg, Oral, Daily  metoprolol succinate XL, 50 mg, Oral, Daily  potassium chloride, 20 mEq, Oral, Daily  predniSONE, 10 mg, Oral, Daily  [START ON 2022] predniSONE, 5 mg, Oral, Daily With Breakfast  remdesivir, 100 mg, Intravenous, Q24H  sodium chloride, 10 mL, Intravenous, Q12H  tacrolimus, 1 mg, Oral, BID          Continuous Infusions:heparin, 12 Units/kg/hr, Last Rate: Stopped (22 1713)  Pharmacy Consult - Remdesivir,         PRN Meds:•  acetaminophen **OR** acetaminophen **OR** acetaminophen  •  acetaminophen  •  albuterol sulfate HFA  •  diazePAM  •  diphenhydrAMINE  •  heparin  •  heparin  •  HYDROcodone-acetaminophen  •  ondansetron **OR** ondansetron  •  ondansetron **OR** ondansetron  •  Pharmacy Consult - Remdesivir  •  [COMPLETED] Insert peripheral IV **AND** sodium chloride  •  sodium chloride     Exam:  /61 (BP Location: Left arm, Patient Position: Lying)   Pulse (!) 121   Temp 98.2 °F (36.8 °C) (Oral)   Resp 20   Ht 170.2 cm (67\")   Wt 81.4 kg (179 lb 7.3 oz)   SpO2 93%   BMI 28.11 kg/m²     Intake/Output last 3 shifts:  I/O last 3 completed shifts:  In: 480 [P.O.:480]  Out: 350 [Urine:350]    Intake/Output this shift:  No intake/output data recorded.    Physical exam:  General " Appearance:  Alert  Head:  Normocephalic, without obvious abnormality, atraumatic  Eyes:  PERRL, conjunctiva/corneas clear     Neck:  Supple,  no adenopathy;      Lungs:  Decreased BS occasion ronchi  Heart:  Regular rate and rhythm, S1 and S2 normal  Abdomen:  Soft, non-tender, bowel sounds active   Extremities: trace edema  Pulses: 2+ and symmetric all extremities  Skin:  No rashes or lesions       Data Review:  All labs (24hrs):   Recent Results (from the past 24 hour(s))   Hepatic Function Panel    Collection Time: 08/12/22  9:33 AM    Specimen: Blood   Result Value Ref Range    Total Protein 6.0 6.0 - 8.5 g/dL    Albumin 3.80 3.50 - 5.20 g/dL    ALT (SGPT) 40 (H) 1 - 33 U/L    AST (SGOT) 74 (H) 1 - 32 U/L    Alkaline Phosphatase 53 39 - 117 U/L    Total Bilirubin 1.2 0.0 - 1.2 mg/dL    Bilirubin, Direct 0.4 (H) 0.0 - 0.3 mg/dL    Bilirubin, Indirect 0.8 mg/dL   Creatinine, Serum    Collection Time: 08/12/22  9:33 AM    Specimen: Blood   Result Value Ref Range    Creatinine 0.69 0.57 - 1.00 mg/dL    eGFR 90.6 >60.0 mL/min/1.73   aPTT    Collection Time: 08/12/22  9:33 AM    Specimen: Blood   Result Value Ref Range    .1 (C) 61.0 - 76.5 seconds   aPTT    Collection Time: 08/12/22 12:05 PM    Specimen: Blood   Result Value Ref Range    PTT 50.5 (L) 61.0 - 76.5 seconds   Adult Transthoracic Echo Limited W/ Cont if Necessary Per Protocol    Collection Time: 08/12/22  3:06 PM   Result Value Ref Range    Target HR (85%) 123 bpm    Max. Pred. HR (100%) 145 bpm    EF(MOD-bp) 55 %   Magnesium    Collection Time: 08/13/22  3:53 AM    Specimen: Blood   Result Value Ref Range    Magnesium 1.7 1.6 - 2.4 mg/dL   Phosphorus    Collection Time: 08/13/22  3:53 AM    Specimen: Blood   Result Value Ref Range    Phosphorus 2.7 2.5 - 4.5 mg/dL   CBC Auto Differential    Collection Time: 08/13/22  3:53 AM    Specimen: Blood   Result Value Ref Range    WBC 2.20 (L) 3.40 - 10.80 10*3/mm3    RBC 3.47 (L) 3.77 - 5.28 10*6/mm3     Hemoglobin 11.0 (L) 12.0 - 15.9 g/dL    Hematocrit 33.2 (L) 34.0 - 46.6 %    MCV 95.5 79.0 - 97.0 fL    MCH 31.7 26.6 - 33.0 pg    MCHC 33.2 31.5 - 35.7 g/dL    RDW 14.9 12.3 - 15.4 %    RDW-SD 50.3 37.0 - 54.0 fl    MPV 7.2 6.0 - 12.0 fL    Platelets 85 (L) 140 - 450 10*3/mm3    Neutrophil % 66.1 42.7 - 76.0 %    Lymphocyte % 18.6 (L) 19.6 - 45.3 %    Monocyte % 14.7 (H) 5.0 - 12.0 %    Eosinophil % 0.2 (L) 0.3 - 6.2 %    Basophil % 0.4 0.0 - 1.5 %    Neutrophils, Absolute 1.50 (L) 1.70 - 7.00 10*3/mm3    Lymphocytes, Absolute 0.40 (L) 0.70 - 3.10 10*3/mm3    Monocytes, Absolute 0.30 0.10 - 0.90 10*3/mm3    Eosinophils, Absolute 0.00 0.00 - 0.40 10*3/mm3    Basophils, Absolute 0.00 0.00 - 0.20 10*3/mm3    nRBC 0.7 (H) 0.0 - 0.2 /100 WBC   Hepatic Function Panel    Collection Time: 08/13/22  6:46 AM    Specimen: Blood   Result Value Ref Range    Total Protein 5.0 (L) 6.0 - 8.5 g/dL    Albumin 3.10 (L) 3.50 - 5.20 g/dL    ALT (SGPT) 27 1 - 33 U/L    AST (SGOT) 45 (H) 1 - 32 U/L    Alkaline Phosphatase 44 39 - 117 U/L    Total Bilirubin 0.7 0.0 - 1.2 mg/dL    Bilirubin, Direct 0.3 0.0 - 0.3 mg/dL    Bilirubin, Indirect 0.4 mg/dL   Basic Metabolic Panel    Collection Time: 08/13/22  6:46 AM    Specimen: Blood   Result Value Ref Range    Glucose 92 65 - 99 mg/dL    BUN 13 8 - 23 mg/dL    Creatinine 0.64 0.57 - 1.00 mg/dL    Sodium 141 136 - 145 mmol/L    Potassium 3.8 3.5 - 5.2 mmol/L    Chloride 106 98 - 107 mmol/L    CO2 28.0 22.0 - 29.0 mmol/L    Calcium 9.4 8.6 - 10.5 mg/dL    BUN/Creatinine Ratio 20.3 7.0 - 25.0    Anion Gap 7.0 5.0 - 15.0 mmol/L    eGFR 92.3 >60.0 mL/min/1.73   [facility]    Collection Time: 08/13/22  6:46 AM   Result Value Ref Range    Extra Tube Hold for add-ons.           Imaging:  XR Chest 1 View    Result Date: 8/11/2022  Moderate cardiomegaly with features of interstitial edema and small bilateral pleural effusions. Probable mild left basilar atelectasis.  Electronically Signed By-Karie  MD Juli On:8/11/2022 4:28 PM This report was finalized on [id number] by  Karie Bustos MD.    CT Angiogram Chest Pulmonary Embolism    Result Date: 8/12/2022  1. No pulmonary embolus. 2. Small pleural effusions. 3. Severe centrilobular emphysema but no airspace consolidation. 4. Right lobectomy. 5. Severe renal atrophy Electronically signed by:  Zach Zendejas M.D.  8/11/2022 11:29 PM      Assessment/Plan:     Dyspnea on exertion    Elevated troponin    COVID-19 virus detected    COPD (chronic obstructive pulmonary disease) (HCC)    NSTEMI (non-ST elevated myocardial infarction) (HCC)       Kidney transplant with no signs of infection rejection or side effects              -Imuran and prednisone and Prograf  Shortness of breath  COVID-19 infection  Elevated troponin  COPD    Creatinine stable  Status post cardiac cath  Mucomyst given  Renal wise stable

## 2022-08-13 NOTE — PROGRESS NOTES
[facility] Medicine Services Daily Progress Note    Patient Name: Jaja Carcamo  : 1945  MRN: [id number]  Primary Care Physician:  Gary Bunch MD  Date of admission: 2022      Subjective      Chief Complaint: Chest pain and shortness of breath      Patient Reports     22: Tmax 100.9F. Complains of chronic back pain.  Vaccinated.  Planned LHC this afternoon.  On heparin drip.  Home oxygen.    22: Patient in A. fib this morning.  Started Eliquis.  Complains of diarrhea.    Review of Systems   All other systems reviewed and are negative.         Objective      Vitals:   Temp:  [98 °F (36.7 °C)-98.6 °F (37 °C)] 98.2 °F (36.8 °C)  Heart Rate:  [] 117  Resp:  [18-22] 20  BP: (112-157)/(48-78) 129/68  Flow (L/min):  [2-5] 2    Physical Exam  HENT:      Head: Normocephalic.      Mouth/Throat:      Mouth: Mucous membranes are moist.   Eyes:      General: No scleral icterus.     Extraocular Movements: Extraocular movements intact.      Pupils: Pupils are equal, round, and reactive to light.   Cardiovascular:      Rate and Rhythm: Normal rate and regular rhythm.   Pulmonary:      Effort: Pulmonary effort is normal.   Abdominal:      General: Bowel sounds are normal.   Musculoskeletal:         General: Normal range of motion.      Cervical back: Normal range of motion.   Skin:     General: Skin is warm.   Neurological:      Mental Status: She is alert. Mental status is at baseline.   Psychiatric:         Mood and Affect: Mood normal.             Result Review    Result Review:  I have personally reviewed the results from the time of this admission to 2022 10:46 EDT and agree with these findings:  [x]  Laboratory  []  Microbiology  [x]  Radiology  []  EKG/Telemetry   []  Cardiology/Vascular   []  Pathology  [x]  Old records  []  Other:            Assessment & Plan      Brief Patient Summary:        Acetylcysteine, 600 mg, Oral, BID  amiodarone, 150 mg, Intravenous,  Once  apixaban, 5 mg, Oral, Q12H  atorvastatin, 10 mg, Oral, Daily  azaTHIOprine, 50 mg, Oral, BID  DULoxetine, 40 mg, Oral, Daily  levothyroxine, 100 mcg, Oral, Once per day on Sun Sat  levothyroxine, 50 mcg, Oral, Daily  metoprolol succinate XL, 50 mg, Oral, Daily  potassium chloride, 20 mEq, Oral, Daily  predniSONE, 10 mg, Oral, Daily  [START ON 8/17/2022] predniSONE, 5 mg, Oral, Daily With Breakfast  remdesivir, 100 mg, Intravenous, Q24H  sodium chloride, 10 mL, Intravenous, Q12H  tacrolimus, 1 mg, Oral, BID       amiodarone, 1 mg/min   Followed by  amiodarone, 0.5 mg/min  Pharmacy Consult - Remdesivir,          Active Hospital Problems:  Active Hospital Problems    Diagnosis    • **Dyspnea on exertion    • Elevated troponin    • COVID-19 virus detected    • COPD (chronic obstructive pulmonary disease) ([facility])    • NSTEMI (non-ST elevated myocardial infarction) (Formerly [facility])      Added automatically from request for surgery [id number]       NSTEMI:  -Cardiology consulted  -Continue heparin drip  -[facility] planned 8/12/2022    COVID-19 pneumonia:  -Vaccinated patient  -Continue remdesivir, prednisone and bronchodilators    New atrial fibrillation:  -Started Eliquis    History of renal transplant:  -Continue Imuran and Prograf prednisone  -Nephrology following    Chronic hypoxemic respiratory failure:  -Titrate pulse ox> 92%    Chronic back pain:  -Norco PRN        DVT prophylaxis:  Medical DVT prophylaxis orders are present.    CODE STATUS:    Code Status (Patient has no pulse and is not breathing): CPR (Attempt to Resuscitate)  Medical Interventions (Patient has pulse or is breathing): Full Support      Disposition:  I expect patient to be discharged home.    This patient has been examined wearing appropriate Personal Protective Equipment and discussed with nursing. 08/13/22      Electronically signed by Clyde White DO, 08/13/22, 10:46 EDT.  [facility] Hospitalist Team

## 2022-08-13 NOTE — PLAN OF CARE
"Goal Outcome Evaluation:     Pt is a 76 y/o female who presents to [facility] w/ reports of SOA worsening for the past week and chest pain. PMH significant for lung CA w/ resection of the R lower lung w/out chemotherapy or radiation, s/p renal transplant around 2017, on 3L chronic O2. CT (-) for PE, COVID+. Pt diagnosed with NSTEMI, dyspnea on exertion, and pleural effusion. At [facility] pt lives alone in a SSH with no steps, does not drive, and does not utilize an AD. She recently fell in March, fracturing her R wrist. At this time pt presents with decreased activity tolerance, decreased BLE strength, and impaired dynamic standing balance. She would benefit from use of RW at this time. Pt was unable to ambulate further this date secondary to fluctuating heart rate (110-low 140). Pending pt progress she would benefit from home with HHPT, but d/t COVID may not be able to access HHPT. PT will continue to follow and assess 5x/week.     Low Intensity Therapy recommended post-acute care - This is recommended as therapy feels this patient would require 2-3 visits per week. OP or HH would be the best option depending on patient's home bound status. Consider, if the patient has other  \"skilled\" needs such as wounds, IV antibiotics, etc. Combined with \"low intensity\" could also equate to a SNF. If patient is medically complex, consider LTAC.  "

## 2022-08-14 LAB
ALBUMIN SERPL-MCNC: 3.2 G/DL (ref 3.5–5.2)
ALP SERPL-CCNC: 44 U/L (ref 39–117)
ALT SERPL W P-5'-P-CCNC: 22 U/L (ref 1–33)
ANION GAP SERPL CALCULATED.3IONS-SCNC: 10 MMOL/L (ref 5–15)
AST SERPL-CCNC: 32 U/L (ref 1–32)
BASOPHILS # BLD AUTO: 0 10*3/MM3 (ref 0–0.2)
BASOPHILS NFR BLD AUTO: 0.2 % (ref 0–1.5)
BILIRUB CONJ SERPL-MCNC: 0.2 MG/DL (ref 0–0.3)
BILIRUB INDIRECT SERPL-MCNC: 0.5 MG/DL
BILIRUB SERPL-MCNC: 0.7 MG/DL (ref 0–1.2)
BUN SERPL-MCNC: 17 MG/DL (ref 8–23)
BUN/CREAT SERPL: 26.6 (ref 7–25)
CALCIUM SPEC-SCNC: 8.9 MG/DL (ref 8.6–10.5)
CHLORIDE SERPL-SCNC: 105 MMOL/L (ref 98–107)
CO2 SERPL-SCNC: 24 MMOL/L (ref 22–29)
CREAT SERPL-MCNC: 0.64 MG/DL (ref 0.57–1)
DEPRECATED RDW RBC AUTO: 52.9 FL (ref 37–54)
EGFRCR SERPLBLD CKD-EPI 2021: 92.3 ML/MIN/1.73
EOSINOPHIL # BLD AUTO: 0 10*3/MM3 (ref 0–0.4)
EOSINOPHIL NFR BLD AUTO: 0.1 % (ref 0.3–6.2)
ERYTHROCYTE [DISTWIDTH] IN BLOOD BY AUTOMATED COUNT: 15.1 % (ref 12.3–15.4)
GLUCOSE SERPL-MCNC: 95 MG/DL (ref 65–99)
HCT VFR BLD AUTO: 33.9 % (ref 34–46.6)
HGB BLD-MCNC: 11.1 G/DL (ref 12–15.9)
LYMPHOCYTES # BLD AUTO: 0.7 10*3/MM3 (ref 0.7–3.1)
LYMPHOCYTES NFR BLD AUTO: 28.3 % (ref 19.6–45.3)
MAGNESIUM SERPL-MCNC: 1.7 MG/DL (ref 1.6–2.4)
MCH RBC QN AUTO: 32.1 PG (ref 26.6–33)
MCHC RBC AUTO-ENTMCNC: 32.9 G/DL (ref 31.5–35.7)
MCV RBC AUTO: 97.8 FL (ref 79–97)
MONOCYTES # BLD AUTO: 0.4 10*3/MM3 (ref 0.1–0.9)
MONOCYTES NFR BLD AUTO: 16.8 % (ref 5–12)
NEUTROPHILS NFR BLD AUTO: 1.4 10*3/MM3 (ref 1.7–7)
NEUTROPHILS NFR BLD AUTO: 54.6 % (ref 42.7–76)
NRBC BLD AUTO-RTO: 0.6 /100 WBC (ref 0–0.2)
PHOSPHATE SERPL-MCNC: 2.8 MG/DL (ref 2.5–4.5)
PLATELET # BLD AUTO: 80 10*3/MM3 (ref 140–450)
PMV BLD AUTO: 7.4 FL (ref 6–12)
POTASSIUM SERPL-SCNC: 4 MMOL/L (ref 3.5–5.2)
PROT SERPL-MCNC: 5 G/DL (ref 6–8.5)
RBC # BLD AUTO: 3.46 10*6/MM3 (ref 3.77–5.28)
SODIUM SERPL-SCNC: 139 MMOL/L (ref 136–145)
WBC NRBC COR # BLD: 2.5 10*3/MM3 (ref 3.4–10.8)

## 2022-08-14 PROCEDURE — 84100 ASSAY OF PHOSPHORUS: CPT | Performed by: HOSPITALIST

## 2022-08-14 PROCEDURE — 80076 HEPATIC FUNCTION PANEL: CPT | Performed by: INTERNAL MEDICINE

## 2022-08-14 PROCEDURE — 25010000002 AMIODARONE IN DEXTROSE 5% 360-4.14 MG/200ML-% SOLUTION: Performed by: INTERNAL MEDICINE

## 2022-08-14 PROCEDURE — 99232 SBSQ HOSP IP/OBS MODERATE 35: CPT | Performed by: INTERNAL MEDICINE

## 2022-08-14 PROCEDURE — 80048 BASIC METABOLIC PNL TOTAL CA: CPT | Performed by: INTERNAL MEDICINE

## 2022-08-14 PROCEDURE — 99232 SBSQ HOSP IP/OBS MODERATE 35: CPT | Performed by: HOSPITALIST

## 2022-08-14 PROCEDURE — 63710000001 PREDNISONE PER 5 MG: Performed by: INTERNAL MEDICINE

## 2022-08-14 PROCEDURE — 63710000001 AZATHIOPRINE PER 50 MG: Performed by: INTERNAL MEDICINE

## 2022-08-14 PROCEDURE — 93010 ELECTROCARDIOGRAM REPORT: CPT | Performed by: INTERNAL MEDICINE

## 2022-08-14 PROCEDURE — 63710000001 TACROLIMUS PER 1 MG: Performed by: INTERNAL MEDICINE

## 2022-08-14 PROCEDURE — 93005 ELECTROCARDIOGRAM TRACING: CPT | Performed by: INTERNAL MEDICINE

## 2022-08-14 PROCEDURE — 83735 ASSAY OF MAGNESIUM: CPT | Performed by: HOSPITALIST

## 2022-08-14 PROCEDURE — 25010000002 REMDESIVIR 100 MG/20ML SOLUTION 1 EACH VIAL: Performed by: INTERNAL MEDICINE

## 2022-08-14 PROCEDURE — 36415 COLL VENOUS BLD VENIPUNCTURE: CPT | Performed by: INTERNAL MEDICINE

## 2022-08-14 PROCEDURE — 85025 COMPLETE CBC W/AUTO DIFF WBC: CPT | Performed by: INTERNAL MEDICINE

## 2022-08-14 RX ORDER — AMIODARONE HYDROCHLORIDE 200 MG/1
200 TABLET ORAL
Status: DISCONTINUED | OUTPATIENT
Start: 2022-08-14 | End: 2022-08-16

## 2022-08-14 RX ADMIN — AMIODARONE HYDROCHLORIDE 200 MG: 200 TABLET ORAL at 11:33

## 2022-08-14 RX ADMIN — METOPROLOL SUCCINATE 50 MG: 50 TABLET, EXTENDED RELEASE ORAL at 09:41

## 2022-08-14 RX ADMIN — APIXABAN 5 MG: 5 TABLET, FILM COATED ORAL at 09:41

## 2022-08-14 RX ADMIN — Medication 10 ML: at 09:42

## 2022-08-14 RX ADMIN — TACROLIMUS 1 MG: 0.5 CAPSULE ORAL at 09:40

## 2022-08-14 RX ADMIN — Medication 600 MG: at 09:41

## 2022-08-14 RX ADMIN — ATORVASTATIN CALCIUM 10 MG: 10 TABLET, FILM COATED ORAL at 20:39

## 2022-08-14 RX ADMIN — HYDROCODONE BITARTRATE AND ACETAMINOPHEN 1 TABLET: 7.5; 325 TABLET ORAL at 20:42

## 2022-08-14 RX ADMIN — REMDESIVIR 100 MG: 100 INJECTION, POWDER, LYOPHILIZED, FOR SOLUTION INTRAVENOUS at 20:30

## 2022-08-14 RX ADMIN — TACROLIMUS 1 MG: 0.5 CAPSULE ORAL at 20:40

## 2022-08-14 RX ADMIN — DULOXETINE 40 MG: 20 CAPSULE, DELAYED RELEASE ORAL at 09:40

## 2022-08-14 RX ADMIN — PREDNISONE 10 MG: 10 TABLET ORAL at 09:41

## 2022-08-14 RX ADMIN — POTASSIUM CHLORIDE 20 MEQ: 1500 TABLET, EXTENDED RELEASE ORAL at 09:42

## 2022-08-14 RX ADMIN — LEVOTHYROXINE SODIUM 100 MCG: 0.1 TABLET ORAL at 06:30

## 2022-08-14 RX ADMIN — AZATHIOPRINE 50 MG: 50 TABLET ORAL at 20:39

## 2022-08-14 RX ADMIN — Medication 600 MG: at 20:38

## 2022-08-14 RX ADMIN — ACETAMINOPHEN 650 MG: 325 TABLET, FILM COATED ORAL at 06:31

## 2022-08-14 RX ADMIN — APIXABAN 5 MG: 5 TABLET, FILM COATED ORAL at 20:38

## 2022-08-14 RX ADMIN — Medication 10 ML: at 20:40

## 2022-08-14 RX ADMIN — AZATHIOPRINE 50 MG: 50 TABLET ORAL at 09:41

## 2022-08-14 RX ADMIN — DIAZEPAM 5 MG: 5 TABLET ORAL at 20:42

## 2022-08-14 RX ADMIN — HYDROCODONE BITARTRATE AND ACETAMINOPHEN 1 TABLET: 7.5; 325 TABLET ORAL at 09:41

## 2022-08-14 RX ADMIN — AMIODARONE HYDROCHLORIDE 0.5 MG/MIN: 1.8 INJECTION, SOLUTION INTRAVENOUS at 03:10

## 2022-08-14 NOTE — PLAN OF CARE
Problem: Adult Inpatient Plan of Care  Goal: Plan of Care Review  Outcome: Ongoing, Progressing  Goal: Absence of Hospital-Acquired Illness or Injury  Intervention: Identify and Manage Fall Risk  Recent Flowsheet Documentation  Taken 8/14/2022 0400 by Claire Swann RN  Safety Promotion/Fall Prevention: activity supervised  Taken 8/13/2022 2200 by Claire Swann RN  Safety Promotion/Fall Prevention: assistive device/personal items within reach  Intervention: Prevent Skin Injury  Recent Flowsheet Documentation  Taken 8/14/2022 0400 by Claire Swann RN  Body Position: position changed independently  Intervention: Prevent and Manage VTE (Venous Thromboembolism) Risk  Recent Flowsheet Documentation  Taken 8/14/2022 0400 by Claire Swann RN  Activity Management: activity adjusted per tolerance  Intervention: Prevent Infection  Recent Flowsheet Documentation  Taken 8/14/2022 0600 by Claire Swann RN  Infection Prevention: cohorting utilized  Taken 8/14/2022 0400 by Claire Swann RN  Infection Prevention: cohorting utilized   Goal Outcome Evaluation:

## 2022-08-14 NOTE — PROGRESS NOTES
LOS: 3 days   Admiting Physician- Clyde White,*    Reason For Followup:    COVID-19 infection  Elevated troponin  Shortness of breath  Atrial fibrillation      Subjective     Patient is in isolation    Objective     Hemodynamics are stable but patient went into atrial fibrillation    Review of Systems:   Review of Systems   Constitutional: Negative for chills and fever.   HENT: Negative for ear discharge and nosebleeds.    Eyes: Negative for discharge and redness.   Cardiovascular: Positive for palpitations. Negative for chest pain, orthopnea, paroxysmal nocturnal dyspnea and syncope.   Respiratory: Positive for shortness of breath. Negative for cough and wheezing.    Endocrine: Negative for heat intolerance.   Skin: Negative for rash.   Musculoskeletal: Negative for arthritis and myalgias.   Gastrointestinal: Negative for abdominal pain, melena, nausea and vomiting.   Genitourinary: Negative for dysuria and hematuria.   Neurological: Negative for dizziness, light-headedness, numbness and tremors.   Psychiatric/Behavioral: Negative for depression. The patient is not nervous/anxious.          Vital Signs  Vitals:    08/14/22 0118 08/14/22 0433 08/14/22 0926 08/14/22 0941   BP: 116/54 117/52 129/55 119/56   BP Location:   Left arm    Patient Position:   Lying    Pulse: 63 61 72 65   Resp: 20 20 20    Temp: 97.8 °F (36.6 °C) 97.8 °F (36.6 °C) 97.6 °F (36.4 °C)    TempSrc:   Oral    SpO2: 96% 98% 95%    Weight:  81.3 kg (179 lb 3.7 oz)     Height:         Wt Readings from Last 1 Encounters:   08/14/22 81.3 kg (179 lb 3.7 oz)       Intake/Output Summary (Last 24 hours) at 8/14/2022 1119  Last data filed at 8/14/2022 0926  Gross per 24 hour   Intake 240 ml   Output 300 ml   Net -60 ml     Physical Exam:  Physical Exam    Results Review:   Lab Results (last 24 hours)     Procedure Component Value Units Date/Time    Basic Metabolic Panel [[id number]]  (Abnormal) Collected: 08/14/22 0407    Specimen: Blood  Updated: 08/14/22 0528     Glucose 95 mg/dL      BUN 17 mg/dL      Creatinine 0.64 mg/dL      Sodium 139 mmol/L      Potassium 4.0 mmol/L      Comment: Slight hemolysis detected by analyzer. Results may be affected.        Chloride 105 mmol/L      CO2 24.0 mmol/L      Calcium 8.9 mg/dL      BUN/Creatinine Ratio 26.6     Anion Gap 10.0 mmol/L      eGFR 92.3 mL/min/1.73      Comment: National Kidney Foundation and American Society of Nephrology (ASN) Task Force recommended calculation based on the Chronic Kidney Disease Epidemiology Collaboration (CKD-EPI) equation refit without adjustment for race.       Narrative:      GFR Normal >60  Chronic Kidney Disease <60  Kidney Failure <15      Phosphorus [[id number]]  (Normal) Collected: 08/14/22 0407    Specimen: Blood Updated: 08/14/22 0528     Phosphorus 2.8 mg/dL     Magnesium [[id number]]  (Normal) Collected: 08/14/22 0407    Specimen: Blood Updated: 08/14/22 0528     Magnesium 1.7 mg/dL     Hepatic Function Panel [[id number]]  (Abnormal) Collected: 08/14/22 0407    Specimen: Blood Updated: 08/14/22 0528     Total Protein 5.0 g/dL      Albumin 3.20 g/dL      ALT (SGPT) 22 U/L      AST (SGOT) 32 U/L      Alkaline Phosphatase 44 U/L      Total Bilirubin 0.7 mg/dL      Bilirubin, Direct 0.2 mg/dL      Bilirubin, Indirect 0.5 mg/dL     CBC & Differential [[id number]]  (Abnormal) Collected: 08/14/22 0407    Specimen: Blood Updated: 08/14/22 0451    Narrative:      The following orders were created for panel order CBC & Differential.  Procedure                               Abnormality         Status                     ---------                               -----------         ------                     CBC Auto Differential[[id number]]        Abnormal            Final result                 Please view results for these tests on the individual orders.    CBC Auto Differential [[id number]]  (Abnormal) Collected: 08/14/22 0407    Specimen: Blood Updated: 08/14/22 0451     WBC  2.50 10*3/mm3      RBC 3.46 10*6/mm3      Hemoglobin 11.1 g/dL      Hematocrit 33.9 %      MCV 97.8 fL      MCH 32.1 pg      MCHC 32.9 g/dL      RDW 15.1 %      RDW-SD 52.9 fl      MPV 7.4 fL      Platelets 80 10*3/mm3      Neutrophil % 54.6 %      Lymphocyte % 28.3 %      Monocyte % 16.8 %      Eosinophil % 0.1 %      Basophil % 0.2 %      Neutrophils, Absolute 1.40 10*3/mm3      Lymphocytes, Absolute 0.70 10*3/mm3      Monocytes, Absolute 0.40 10*3/mm3      Eosinophils, Absolute 0.00 10*3/mm3      Basophils, Absolute 0.00 10*3/mm3      nRBC 0.6 /100 WBC         Imaging Results (Last 72 Hours)     Procedure Component Value Units Date/Time    CT Angiogram Chest Pulmonary Embolism [[id number]] Collected: 08/12/22 0124     Updated: 08/12/22 0130    Narrative:      CT ANGIOGRAM CHEST WITH IV CONTRAST       INDICATION: Shortness of breath and elevated d-dimer. History of lobectomy for lung cancer.    COMPARISON: 4/1/2015    PROCEDURE: Multiple axial CT images were obtained of the chest after IV administration of 84 mL of Isovue-370.  Coronal and sagittal reformations as well as MIP images were obtained.  CT dose lowering techniques were used, to include: automated exposure   control, adjustment for patient size, and or use of iterative reconstruction.    FINDINGS:    Cardiovascular: No filling defects are seen in the pulmonary arteries. No focal aortic aneurysm or evidence of aortic dissection is seen.    Mediastinum/Torie: No significant mediastinal or hilar adenopathy is seen.    Lungs/Pleura :  Severe centrilobular emphysema. There has been a right lobectomy. It may have been the lower lobe of the exact point is difficult to say. There is suture material in the right lung base. There are small pleural effusions.    Chest wall and Axilla: Unremarkable.    Bones:  No acute focal bony abnormality seen.    Upper abdomen: Severe renal atrophy.      Impression:      1. No pulmonary embolus.  2. Small pleural effusions.  3.  Severe centrilobular emphysema but no airspace consolidation.  4. Right lobectomy.  5. Severe renal atrophy    Electronically signed by:  Zach Zendejas M.D.    8/11/2022 11:29 PM    XR Chest 1 View [[id number]] Collected: 08/11/22 1627     Updated: 08/11/22 1630    Narrative:      DATE OF EXAM:  8/11/2022 4:17 PM     PROCEDURE:  XR CHEST 1 VW-     INDICATIONS:  soa       COMPARISON:  PA and lateral chest 5/17/2016     TECHNIQUE:   Single radiographic view of the chest was obtained.     FINDINGS:  Heart size is moderately enlarged. There are small layering bilateral  pleural effusions. Increased interstitial thickening in both lungs  suggest the presence of pulmonary edema. There is probable mild linear  left basilar atelectasis. Benign calcified granuloma in the left upper  lobe. No acute osseous abnormality.       Impression:      Moderate cardiomegaly with features of interstitial edema and small  bilateral pleural effusions. Probable mild left basilar atelectasis.     Electronically Signed By-Karie Bustos MD On:8/11/2022 4:28 PM  This report was finalized on [id number] by  Karie Bustos MD.        ECG/EMG Results (most recent)     Procedure Component Value Units Date/Time    ECG 12 Lead [[id number]] Collected: 08/11/22 1308     Updated: 08/12/22 1646     QT Interval 384 ms     Narrative:      HEART RATE= 108  bpm  RR Interval= 556  ms  GA Interval= 209  ms  P Horizontal Axis= -49  deg  P Front Axis= 46  deg  QRSD Interval= 75  ms  QT Interval= 384  ms  QRS Axis= -26  deg  T Wave Axis= 16  deg  - ABNORMAL ECG -  Sinus tachycardia  Borderline prolonged GA interval  Prolonged QT interval  When compared with ECG of 01-Mar-2022 11:57:47,  Significant rate increase  New conduction abnormality  Significant repolarization change  Significant axis, voltage or hypertrophy change  Electronically Signed By: Maycol Campoverde (TWILA) 12-Aug-2022 16:46:12  Date and Time of Study: 2022-08-11 13:08:10    Adult  Transthoracic Echo Limited W/ Cont if Necessary Per Protocol [[id number]] Resulted: 08/12/22 1732     Updated: 08/12/22 1737     Target HR (85%) 123 bpm      Max. Pred. HR (100%) 145 bpm      EF(MOD-bp) 55 %     Narrative:      · Calculated left ventricular EF = 55% Estimated left ventricular EF was   in agreement with the calculated left ventricular EF.  · Left ventricular diastolic function was not assessed.  · The right atrial cavity is borderline dilated.  · Moderate mitral valve regurgitation is present.       ECG 12 Lead [[id number]] Collected: 08/13/22 0840     Updated: 08/13/22 0840     QT Interval 333 ms     Narrative:      HEART RATE= 121  bpm  RR Interval= 494  ms  ME Interval=   ms  P Horizontal Axis=   deg  P Front Axis=   deg  QRSD Interval= 75  ms  QT Interval= 333  ms  QRS Axis= -32  deg  T Wave Axis= 83  deg  - ABNORMAL ECG -  Atrial fibrillation  Ventricular premature complex  Left axis deviation  Nonspecific repol abnormality, diffuse leads  Electronically Signed By:   Date and Time of Study: 2022-08-13 08:40:07    SCANNED - TELEMETRY   [[id number]] Resulted: 08/11/22     Updated: 08/14/22 0818    ECG 12 Lead [[id number]] Collected: 08/14/22 1016     Updated: 08/14/22 1017     QT Interval 484 ms     Narrative:      HEART RATE= 62  bpm  RR Interval= 972  ms  ME Interval= 67  ms  P Horizontal Axis= 118  deg  P Front Axis= 0  deg  QRSD Interval= 82  ms  QT Interval= 484  ms  QRS Axis= -23  deg  T Wave Axis= 33  deg  - NORMAL ECG -  Sinus rhythm  Electronically Signed By:   Date and Time of Study: 2022-08-14 10:16:18        CBC    Results from last 7 days   Lab Units 08/14/22  0407 08/13/22  0353 08/11/22  1614   WBC 10*3/mm3 2.50* 2.20* 3.40   HEMOGLOBIN g/dL 11.1* 11.0* 11.5*   PLATELETS 10*3/mm3 80* 85* 94*     BMP   Results from last 7 days   Lab Units 08/14/22  0407 08/13/22  0646 08/13/22  0353 08/12/22  0933 08/11/22  2305 08/11/22  1614 08/08/22  1603   SODIUM mmol/L 139 141  --   --   --  140  --     POTASSIUM mmol/L 4.0 3.8  --   --   --  4.4  --    CHLORIDE mmol/L 105 106  --   --   --  104  --    CO2 mmol/L 24.0 28.0  --   --   --  28.0  --    BUN mg/dL 17 13  --   --   --  16  --    CREATININE mg/dL 0.64 0.64  --  0.69 0.72 0.70  --    GLUCOSE mg/dL 95 92  --   --   --  103*  --    MAGNESIUM mg/dL 1.7  --  1.7  --   --   --  1.7   PHOSPHORUS mg/dL 2.8  --  2.7  --   --   --   --      CMP   Results from last 7 days   Lab Units 08/14/22  0407 08/13/22  0646 08/12/22  0933 08/11/22  2305 08/11/22  1614   SODIUM mmol/L 139 141  --   --  140   POTASSIUM mmol/L 4.0 3.8  --   --  4.4   CHLORIDE mmol/L 105 106  --   --  104   CO2 mmol/L 24.0 28.0  --   --  28.0   BUN mg/dL 17 13  --   --  16   CREATININE mg/dL 0.64 0.64 0.69 0.72 0.70   GLUCOSE mg/dL 95 92  --   --  103*   ALBUMIN g/dL 3.20* 3.10* 3.80 3.50 3.90   BILIRUBIN mg/dL 0.7 0.7 1.2 1.2 1.4*   ALK PHOS U/L 44 44 53 47 55   AST (SGOT) U/L 32 45* 74* 69* 76*   ALT (SGPT) U/L 22 27 40* 33 34*     Cardiac Studies:  Echo- Results for orders placed during the hospital encounter of 08/11/22    Adult Transthoracic Echo Limited W/ Cont if Necessary Per Protocol    Interpretation Summary  · Calculated left ventricular EF = 55% Estimated left ventricular EF was in agreement with the calculated left ventricular EF.  · Left ventricular diastolic function was not assessed.  · The right atrial cavity is borderline dilated.  · Moderate mitral valve regurgitation is present.    Stress Myoview-  Cath-      Medication Review:   Scheduled Meds:Acetylcysteine, 600 mg, Oral, BID  apixaban, 5 mg, Oral, Q12H  atorvastatin, 10 mg, Oral, Daily  azaTHIOprine, 50 mg, Oral, BID  DULoxetine, 40 mg, Oral, Daily  levothyroxine, 100 mcg, Oral, Once per day on Sun Sat  levothyroxine, 50 mcg, Oral, Daily  metoprolol succinate XL, 50 mg, Oral, Daily  potassium chloride, 20 mEq, Oral, Daily  predniSONE, 10 mg, Oral, Daily  [START ON 8/17/2022] predniSONE, 5 mg, Oral, Daily With  Breakfast  remdesivir, 100 mg, Intravenous, Q24H  sodium chloride, 10 mL, Intravenous, Q12H  tacrolimus, 1 mg, Oral, BID      Continuous Infusions:amiodarone, 0.5 mg/min, Last Rate: 0.5 mg/min (08/14/22 0310)  Pharmacy Consult - Remdesivir,       PRN Meds:.•  acetaminophen **OR** acetaminophen **OR** acetaminophen  •  albuterol sulfate HFA  •  diazePAM  •  diphenhydrAMINE  •  HYDROcodone-acetaminophen  •  loperamide  •  ondansetron **OR** ondansetron  •  Pharmacy Consult - Remdesivir  •  [COMPLETED] Insert peripheral IV **AND** sodium chloride  •  sodium chloride      Assessment & Plan   Patient Active Problem List   Diagnosis   • Right wrist fracture   • Dyspnea on exertion   • Elevated troponin   • COVID-19 virus detected   • COPD (chronic obstructive pulmonary disease) ([facility])   • NSTEMI (non-ST elevated myocardial infarction) ([facility])     MDM:    1.  Atrial fibrillation:    Patient went back into atrial fibrillation I would recommend to start intravenous amiodarone.  Patient is still on amiodarone.  I would repeat EKG today.  Patient has converted into sinus rhythm.  I would discontinue amiodarone by intravenous and give her p.o.    2.  COVID-19 infection:    Patient is on prednisone and remdesivir continue current treatment    3.  Elevated troponin:    Patient underwent cardiac catheterization and nonobstructive CAD noted    lEliot Gomes MD  08/14/22  11:19 EDT

## 2022-08-14 NOTE — PROGRESS NOTES
"                                                                                                                                      Nephrology  Progress Note                                        [facility]    Patient Identification    Name: Jaja Carcamo  Age: 77 y.o.  Sex: female  :  1945  MRN: [id number]      DATE OF SERVICE:  2022        Subective    No new complaints        Objective   Scheduled Meds:Acetylcysteine, 600 mg, Oral, BID  apixaban, 5 mg, Oral, Q12H  atorvastatin, 10 mg, Oral, Daily  azaTHIOprine, 50 mg, Oral, BID  DULoxetine, 40 mg, Oral, Daily  levothyroxine, 100 mcg, Oral, Once per day on Sun Sat  levothyroxine, 50 mcg, Oral, Daily  metoprolol succinate XL, 50 mg, Oral, Daily  potassium chloride, 20 mEq, Oral, Daily  predniSONE, 10 mg, Oral, Daily  [START ON 2022] predniSONE, 5 mg, Oral, Daily With Breakfast  remdesivir, 100 mg, Intravenous, Q24H  sodium chloride, 10 mL, Intravenous, Q12H  tacrolimus, 1 mg, Oral, BID          Continuous Infusions:amiodarone, 0.5 mg/min, Last Rate: 0.5 mg/min (22 0310)  Pharmacy Consult - Remdesivir,         PRN Meds:•  acetaminophen **OR** acetaminophen **OR** acetaminophen  •  acetaminophen  •  albuterol sulfate HFA  •  diazePAM  •  diphenhydrAMINE  •  HYDROcodone-acetaminophen  •  loperamide  •  ondansetron **OR** ondansetron  •  ondansetron **OR** ondansetron  •  Pharmacy Consult - Remdesivir  •  [COMPLETED] Insert peripheral IV **AND** sodium chloride  •  sodium chloride     Exam:  /52   Pulse 61   Temp 97.8 °F (36.6 °C)   Resp 20   Ht 170.2 cm (67\")   Wt 81.3 kg (179 lb 3.7 oz)   SpO2 98%   BMI 28.07 kg/m²     Intake/Output last 3 shifts:  I/O last 3 completed shifts:  In: -   Out: 550 [Urine:550]    Intake/Output this shift:  No intake/output data recorded.    Physical exam:  General Appearance:  Alert  Head:  Normocephalic, without obvious abnormality, atraumatic  Eyes:  PERRL, conjunctiva/corneas " clear     Neck:  Supple,  no adenopathy;      Lungs:  Decreased BS occasion ronchi  Heart:  Regular rate and rhythm, S1 and S2 normal  Abdomen:  Soft, non-tender, bowel sounds active   Extremities: trace edema  Pulses: 2+ and symmetric all extremities  Skin:  No rashes or lesions       Data Review:  All labs (24hrs):   Recent Results (from the past 24 hour(s))   ECG 12 Lead    Collection Time: 08/13/22  8:40 AM   Result Value Ref Range    QT Interval 333 ms   Hepatic Function Panel    Collection Time: 08/14/22  4:07 AM    Specimen: Blood   Result Value Ref Range    Total Protein 5.0 (L) 6.0 - 8.5 g/dL    Albumin 3.20 (L) 3.50 - 5.20 g/dL    ALT (SGPT) 22 1 - 33 U/L    AST (SGOT) 32 1 - 32 U/L    Alkaline Phosphatase 44 39 - 117 U/L    Total Bilirubin 0.7 0.0 - 1.2 mg/dL    Bilirubin, Direct 0.2 0.0 - 0.3 mg/dL    Bilirubin, Indirect 0.5 mg/dL   Basic Metabolic Panel    Collection Time: 08/14/22  4:07 AM    Specimen: Blood   Result Value Ref Range    Glucose 95 65 - 99 mg/dL    BUN 17 8 - 23 mg/dL    Creatinine 0.64 0.57 - 1.00 mg/dL    Sodium 139 136 - 145 mmol/L    Potassium 4.0 3.5 - 5.2 mmol/L    Chloride 105 98 - 107 mmol/L    CO2 24.0 22.0 - 29.0 mmol/L    Calcium 8.9 8.6 - 10.5 mg/dL    BUN/Creatinine Ratio 26.6 (H) 7.0 - 25.0    Anion Gap 10.0 5.0 - 15.0 mmol/L    eGFR 92.3 >60.0 mL/min/1.73   Magnesium    Collection Time: 08/14/22  4:07 AM    Specimen: Blood   Result Value Ref Range    Magnesium 1.7 1.6 - 2.4 mg/dL   Phosphorus    Collection Time: 08/14/22  4:07 AM    Specimen: Blood   Result Value Ref Range    Phosphorus 2.8 2.5 - 4.5 mg/dL   CBC Auto Differential    Collection Time: 08/14/22  4:07 AM    Specimen: Blood   Result Value Ref Range    WBC 2.50 (L) 3.40 - 10.80 10*3/mm3    RBC 3.46 (L) 3.77 - 5.28 10*6/mm3    Hemoglobin 11.1 (L) 12.0 - 15.9 g/dL    Hematocrit 33.9 (L) 34.0 - 46.6 %    MCV 97.8 (H) 79.0 - 97.0 fL    MCH 32.1 26.6 - 33.0 pg    MCHC 32.9 31.5 - 35.7 g/dL    RDW 15.1 12.3 - 15.4 %     RDW-SD 52.9 37.0 - 54.0 fl    MPV 7.4 6.0 - 12.0 fL    Platelets 80 (L) 140 - 450 10*3/mm3    Neutrophil % 54.6 42.7 - 76.0 %    Lymphocyte % 28.3 19.6 - 45.3 %    Monocyte % 16.8 (H) 5.0 - 12.0 %    Eosinophil % 0.1 (L) 0.3 - 6.2 %    Basophil % 0.2 0.0 - 1.5 %    Neutrophils, Absolute 1.40 (L) 1.70 - 7.00 10*3/mm3    Lymphocytes, Absolute 0.70 0.70 - 3.10 10*3/mm3    Monocytes, Absolute 0.40 0.10 - 0.90 10*3/mm3    Eosinophils, Absolute 0.00 0.00 - 0.40 10*3/mm3    Basophils, Absolute 0.00 0.00 - 0.20 10*3/mm3    nRBC 0.6 (H) 0.0 - 0.2 /100 WBC          Imaging:  XR Chest 1 View    Result Date: 8/11/2022  Moderate cardiomegaly with features of interstitial edema and small bilateral pleural effusions. Probable mild left basilar atelectasis.  Electronically Signed By-Karie Bustos MD On:8/11/2022 4:28 PM This report was finalized on [id number] by  Karie Bustos MD.    CT Angiogram Chest Pulmonary Embolism    Result Date: 8/12/2022  1. No pulmonary embolus. 2. Small pleural effusions. 3. Severe centrilobular emphysema but no airspace consolidation. 4. Right lobectomy. 5. Severe renal atrophy Electronically signed by:  Zach Zendejas M.D.  8/11/2022 11:29 PM      Assessment/Plan:     Dyspnea on exertion    Elevated troponin    COVID-19 virus detected    COPD (chronic obstructive pulmonary disease) ([facility])    NSTEMI (non-ST elevated myocardial infarction) ([facility])       Kidney transplant with no signs of infection rejection or side effects              -Imuran and prednisone and Prograf  Shortness of breath  COVID-19 infection  Elevated troponin  COPD    Creatinine stable  Status post cardiac cath  Mucomyst given  Renal wise stable

## 2022-08-14 NOTE — PROGRESS NOTES
[facility] Medicine Services Daily Progress Note    Patient Name: Jaja Carcamo  : 1945  MRN: [id number]  Primary Care Physician:  Gary Bunch MD  Date of admission: 2022      Subjective      Chief Complaint: Chest pain and shortness of breath      Patient Reports     22: Tmax 100.9F. Complains of chronic back pain.  Vaccinated.  Planned LHC this afternoon.  On heparin drip.  Home oxygen.    22: Patient in A. fib this morning.  Started on amiodarone drip and Eliquis.  Complains of diarrhea.  Lives alone.  On home oxygen.    22: Complains of being nauseous.  Amiodarone changed to oral.    Review of Systems   All other systems reviewed and are negative.         Objective      Vitals:   Temp:  [97.4 °F (36.3 °C)-97.8 °F (36.6 °C)] 97.4 °F (36.3 °C)  Heart Rate:  [61-72] 68  Resp:  [20] 20  BP: (108-129)/(46-89) 116/89  Flow (L/min):  [2] 2    Physical Exam  HENT:      Head: Normocephalic.      Mouth/Throat:      Mouth: Mucous membranes are moist.   Eyes:      General: No scleral icterus.     Extraocular Movements: Extraocular movements intact.      Pupils: Pupils are equal, round, and reactive to light.   Cardiovascular:      Rate and Rhythm: Normal rate and regular rhythm.   Pulmonary:      Effort: Pulmonary effort is normal.   Abdominal:      General: Bowel sounds are normal.   Musculoskeletal:         General: Normal range of motion.      Cervical back: Normal range of motion.   Skin:     General: Skin is warm.   Neurological:      Mental Status: She is alert. Mental status is at baseline.   Psychiatric:         Mood and Affect: Mood normal.             Result Review    Result Review:  I have personally reviewed the results from the time of this admission to 2022 15:54 EDT and agree with these findings:  [x]  Laboratory  []  Microbiology  [x]  Radiology  []  EKG/Telemetry   []  Cardiology/Vascular   []  Pathology  [x]  Old records  []   Other:            Assessment & Plan      Brief Patient Summary:    Patient is a 77-year-old vaccinated female with history of renal transplant, chronic hypoxemic respiratory failure on home oxygen, anxiety on diazepam, hypothyroidism, chronic pain on hydrocodone and hyperlipidemia.    The patient came to the ED on 8/11/2022 complaining of about 1 week history of shortness of air.  She had been evaluated at [facility] on 8/8/2022 for shortness of air   CTA of the chest in the ED ruled out pulmonary embolism.  It showed small pleural effusions and severe central global emphysema, right lobectomy and severe renal atrophy.    The patient was then admitted to PCU.  She was evaluated by cardiologist regarding elevated troponins.   Nephrologist followed the patient for history of renal transplant and to prepare patient for left heart catheterization.  The patient underwent LHC on 8/12/2022 and it showed nonobstructive CAD.  TTE showed LVEF 55% and moderate MR. The patient had new onset atrial fibrillation on 8/13/2022 therefore was started on Eliquis and amiodarone drip.       Acetylcysteine, 600 mg, Oral, BID  amiodarone, 200 mg, Oral, Q24H  apixaban, 5 mg, Oral, Q12H  atorvastatin, 10 mg, Oral, Daily  azaTHIOprine, 50 mg, Oral, BID  DULoxetine, 40 mg, Oral, Daily  levothyroxine, 100 mcg, Oral, Once per day on Sun Sat  levothyroxine, 50 mcg, Oral, Daily  metoprolol succinate XL, 50 mg, Oral, Daily  potassium chloride, 20 mEq, Oral, Daily  predniSONE, 10 mg, Oral, Daily  [START ON 8/17/2022] predniSONE, 5 mg, Oral, Daily With Breakfast  remdesivir, 100 mg, Intravenous, Q24H  sodium chloride, 10 mL, Intravenous, Q12H  tacrolimus, 1 mg, Oral, BID       Pharmacy Consult - Remdesivir,          Active Hospital Problems:  Active Hospital Problems    Diagnosis    • **Dyspnea on exertion    • Elevated troponin    • COVID-19 virus detected    • COPD (chronic obstructive pulmonary disease) (HCC)    • NSTEMI (non-ST  elevated myocardial infarction) (HCC)      Added automatically from request for surgery [id number]       NSTEMI:  -Cardiology consulted  -s/p [facility] 8/12/2022--> nonobstructive CAD    COVID-19 pneumonia:  -Vaccinated patient  -Continue remdesivir, prednisone and bronchodilators    New atrial fibrillation:  -Started Eliquis and amiodarone drip 8/13/202  -Amiodarone changed to oral 8/14/2022    History of renal transplant:  -Continue Imuran and Prograf prednisone  -Nephrology following    Chronic hypoxemic respiratory failure:  -On 3L at baseline  -Titrate pulse ox> 92%    Chronic back pain:  -Norco PRN    History of neuroendocrine carcinoma of lung (2016)  -s/p right lower lobe resection        DVT prophylaxis:  Medical DVT prophylaxis orders are present.    CODE STATUS:    Code Status (Patient has no pulse and is not breathing): CPR (Attempt to Resuscitate)  Medical Interventions (Patient has pulse or is breathing): Full Support      Disposition:  I expect patient to be discharged home.    This patient has been examined wearing appropriate Personal Protective Equipment and discussed with nursing. 08/14/22      Electronically signed by Clyde White DO, 08/14/22, 15:54 EDT.  [facility]ist Team

## 2022-08-15 ENCOUNTER — APPOINTMENT (OUTPATIENT)
Dept: GENERAL RADIOLOGY | Facility: HOSPITAL | Age: 77
End: 2022-08-15

## 2022-08-15 LAB
ALBUMIN SERPL-MCNC: 3.4 G/DL (ref 3.5–5.2)
ALP SERPL-CCNC: 46 U/L (ref 39–117)
ALT SERPL W P-5'-P-CCNC: 18 U/L (ref 1–33)
ANION GAP SERPL CALCULATED.3IONS-SCNC: 8 MMOL/L (ref 5–15)
AST SERPL-CCNC: 23 U/L (ref 1–32)
BASOPHILS # BLD AUTO: 0 10*3/MM3 (ref 0–0.2)
BASOPHILS NFR BLD AUTO: 0.1 % (ref 0–1.5)
BILIRUB CONJ SERPL-MCNC: 0.2 MG/DL (ref 0–0.3)
BILIRUB INDIRECT SERPL-MCNC: 0.5 MG/DL
BILIRUB SERPL-MCNC: 0.7 MG/DL (ref 0–1.2)
BUN SERPL-MCNC: 15 MG/DL (ref 8–23)
BUN/CREAT SERPL: 25.4 (ref 7–25)
CALCIUM SPEC-SCNC: 9.2 MG/DL (ref 8.6–10.5)
CHLORIDE SERPL-SCNC: 104 MMOL/L (ref 98–107)
CO2 SERPL-SCNC: 26 MMOL/L (ref 22–29)
CREAT SERPL-MCNC: 0.59 MG/DL (ref 0.57–1)
CREAT UR-MCNC: 66.9 MG/DL
DEPRECATED RDW RBC AUTO: 49.4 FL (ref 37–54)
EGFRCR SERPLBLD CKD-EPI 2021: 94.1 ML/MIN/1.73
EOSINOPHIL # BLD AUTO: 0 10*3/MM3 (ref 0–0.4)
EOSINOPHIL NFR BLD AUTO: 0 % (ref 0.3–6.2)
ERYTHROCYTE [DISTWIDTH] IN BLOOD BY AUTOMATED COUNT: 14.8 % (ref 12.3–15.4)
GLUCOSE SERPL-MCNC: 96 MG/DL (ref 65–99)
HCT VFR BLD AUTO: 33.4 % (ref 34–46.6)
HGB BLD-MCNC: 11.2 G/DL (ref 12–15.9)
LYMPHOCYTES # BLD AUTO: 0.7 10*3/MM3 (ref 0.7–3.1)
LYMPHOCYTES NFR BLD AUTO: 26.2 % (ref 19.6–45.3)
MCH RBC QN AUTO: 31.7 PG (ref 26.6–33)
MCHC RBC AUTO-ENTMCNC: 33.5 G/DL (ref 31.5–35.7)
MCV RBC AUTO: 94.6 FL (ref 79–97)
MONOCYTES # BLD AUTO: 0.5 10*3/MM3 (ref 0.1–0.9)
MONOCYTES NFR BLD AUTO: 16.5 % (ref 5–12)
NEUTROPHILS NFR BLD AUTO: 1.6 10*3/MM3 (ref 1.7–7)
NEUTROPHILS NFR BLD AUTO: 57.2 % (ref 42.7–76)
NRBC BLD AUTO-RTO: 0.5 /100 WBC (ref 0–0.2)
PLATELET # BLD AUTO: 97 10*3/MM3 (ref 140–450)
PMV BLD AUTO: 7.5 FL (ref 6–12)
POTASSIUM SERPL-SCNC: 4.4 MMOL/L (ref 3.5–5.2)
PROT ?TM UR-MCNC: 30.7 MG/DL
PROT SERPL-MCNC: 5.2 G/DL (ref 6–8.5)
QT INTERVAL: 473 MS
RBC # BLD AUTO: 3.53 10*6/MM3 (ref 3.77–5.28)
SODIUM SERPL-SCNC: 138 MMOL/L (ref 136–145)
TSH SERPL DL<=0.05 MIU/L-ACNC: 1.94 UIU/ML (ref 0.27–4.2)
WBC NRBC COR # BLD: 2.8 10*3/MM3 (ref 3.4–10.8)

## 2022-08-15 PROCEDURE — 85025 COMPLETE CBC W/AUTO DIFF WBC: CPT | Performed by: INTERNAL MEDICINE

## 2022-08-15 PROCEDURE — 80048 BASIC METABOLIC PNL TOTAL CA: CPT | Performed by: INTERNAL MEDICINE

## 2022-08-15 PROCEDURE — 84156 ASSAY OF PROTEIN URINE: CPT | Performed by: HOSPITALIST

## 2022-08-15 PROCEDURE — 82570 ASSAY OF URINE CREATININE: CPT | Performed by: HOSPITALIST

## 2022-08-15 PROCEDURE — 93010 ELECTROCARDIOGRAM REPORT: CPT | Performed by: INTERNAL MEDICINE

## 2022-08-15 PROCEDURE — 71045 X-RAY EXAM CHEST 1 VIEW: CPT

## 2022-08-15 PROCEDURE — 25010000002 REMDESIVIR 100 MG/20ML SOLUTION 1 EACH VIAL: Performed by: INTERNAL MEDICINE

## 2022-08-15 PROCEDURE — 93005 ELECTROCARDIOGRAM TRACING: CPT | Performed by: HOSPITALIST

## 2022-08-15 PROCEDURE — 99232 SBSQ HOSP IP/OBS MODERATE 35: CPT | Performed by: HOSPITALIST

## 2022-08-15 PROCEDURE — 97535 SELF CARE MNGMENT TRAINING: CPT

## 2022-08-15 PROCEDURE — 80076 HEPATIC FUNCTION PANEL: CPT | Performed by: INTERNAL MEDICINE

## 2022-08-15 PROCEDURE — 63710000001 PREDNISONE PER 5 MG: Performed by: INTERNAL MEDICINE

## 2022-08-15 PROCEDURE — 97166 OT EVAL MOD COMPLEX 45 MIN: CPT

## 2022-08-15 PROCEDURE — 84443 ASSAY THYROID STIM HORMONE: CPT | Performed by: HOSPITALIST

## 2022-08-15 PROCEDURE — 97530 THERAPEUTIC ACTIVITIES: CPT

## 2022-08-15 PROCEDURE — 99233 SBSQ HOSP IP/OBS HIGH 50: CPT | Performed by: INTERNAL MEDICINE

## 2022-08-15 PROCEDURE — 63710000001 TACROLIMUS PER 1 MG: Performed by: INTERNAL MEDICINE

## 2022-08-15 PROCEDURE — 63710000001 AZATHIOPRINE PER 50 MG: Performed by: INTERNAL MEDICINE

## 2022-08-15 PROCEDURE — 25010000002 FUROSEMIDE PER 20 MG: Performed by: HOSPITALIST

## 2022-08-15 RX ORDER — FUROSEMIDE 10 MG/ML
40 INJECTION INTRAMUSCULAR; INTRAVENOUS ONCE
Status: COMPLETED | OUTPATIENT
Start: 2022-08-15 | End: 2022-08-15

## 2022-08-15 RX ADMIN — APIXABAN 5 MG: 5 TABLET, FILM COATED ORAL at 09:56

## 2022-08-15 RX ADMIN — ATORVASTATIN CALCIUM 10 MG: 10 TABLET, FILM COATED ORAL at 20:43

## 2022-08-15 RX ADMIN — TACROLIMUS 1 MG: 0.5 CAPSULE ORAL at 20:43

## 2022-08-15 RX ADMIN — HYDROCODONE BITARTRATE AND ACETAMINOPHEN 1 TABLET: 7.5; 325 TABLET ORAL at 13:15

## 2022-08-15 RX ADMIN — FUROSEMIDE 40 MG: 10 INJECTION, SOLUTION INTRAMUSCULAR; INTRAVENOUS at 13:15

## 2022-08-15 RX ADMIN — TACROLIMUS 1 MG: 0.5 CAPSULE ORAL at 09:56

## 2022-08-15 RX ADMIN — AZATHIOPRINE 50 MG: 50 TABLET ORAL at 20:43

## 2022-08-15 RX ADMIN — DULOXETINE 40 MG: 20 CAPSULE, DELAYED RELEASE ORAL at 09:56

## 2022-08-15 RX ADMIN — LEVOTHYROXINE SODIUM 50 MCG: 0.05 TABLET ORAL at 05:49

## 2022-08-15 RX ADMIN — AZATHIOPRINE 50 MG: 50 TABLET ORAL at 09:56

## 2022-08-15 RX ADMIN — APIXABAN 5 MG: 5 TABLET, FILM COATED ORAL at 20:43

## 2022-08-15 RX ADMIN — HYDROCODONE BITARTRATE AND ACETAMINOPHEN 1 TABLET: 7.5; 325 TABLET ORAL at 22:00

## 2022-08-15 RX ADMIN — POTASSIUM CHLORIDE 20 MEQ: 1500 TABLET, EXTENDED RELEASE ORAL at 09:56

## 2022-08-15 RX ADMIN — Medication 10 ML: at 20:43

## 2022-08-15 RX ADMIN — DIAZEPAM 5 MG: 5 TABLET ORAL at 22:01

## 2022-08-15 RX ADMIN — PREDNISONE 10 MG: 10 TABLET ORAL at 09:56

## 2022-08-15 RX ADMIN — HYDROCODONE BITARTRATE AND ACETAMINOPHEN 1 TABLET: 7.5; 325 TABLET ORAL at 05:49

## 2022-08-15 RX ADMIN — Medication 10 ML: at 09:56

## 2022-08-15 RX ADMIN — REMDESIVIR 100 MG: 100 INJECTION, POWDER, LYOPHILIZED, FOR SOLUTION INTRAVENOUS at 20:42

## 2022-08-15 NOTE — CASE MANAGEMENT/SOCIAL WORK
Continued Stay Note   [facility]     Patient Name: Jaja Carcamo  MRN: [id number]  Today's Date: 8/15/2022    Admit Date: 8/11/2022     Discharge Plan     Row Name 08/15/22 1546       Plan    Plan Declined HH. Home at discharge. Home O2 phillip (3L). PATRICA-Eliquis $5.00    Plan Comments CM attempted to call into patient's room again x2, no answer. CM attempted to call cell phone, no answer, unable to leave VM. Per floor RN, patient declined HH. Patient will discharge home. Home O2 phillip (3L). PATRICA-Eliquis $5.    Row Name 08/15/22 1440       Plan    Plan COVID + 8/11. Pending HH choices. Home O2 Phillip (3L). PATRICA Eliquis-$5    Plan Comments Pending HH choices, CM attempted to call in the room, no answer and called cell phone, no answer. CM asked RN to retrieve choices when able to do so. PATRICA Eliquis-$5.00 copay. Home O2 w/ Phillip (3L). D/C barriers: remdesivir, nephro, cards following.              Phone communication or documentation only - no physical contact with patient or family.      Lindy Simmons RN

## 2022-08-15 NOTE — PLAN OF CARE
Goal Outcome Evaluation:  Plan of Care Reviewed With: patient, son        Progress: no change  Outcome Evaluation: Pt with Hx kidney transplant, lung CA s/p lobectomy, chronic home O2 use, is admitted w/ chest pain, severe fatigue and inability to care for self. PT has pleural effussion, COVID-19, NSTEMI. She requires assist currently with all ADL and has not walked in the room in 4 days. She completes pivot transfers to the SBC and chair while holding to furniture, stooped. Pt is sncouraged to increase activity but pt reports she is not able due to her severe fatigue. Current D/C recommendation is SNF for rehab, pending progress. Son encourages her to consider this because she lives alone. Pt would prefer HHC. She will need to be more I/ADL (I) and mobile to safely d/c home.

## 2022-08-15 NOTE — THERAPY TREATMENT NOTE
"Subjective: Pt agreeable to therapeutic plan of care. Pt expressed not feeling well today.    Objective:     Bed mobility - N/A or Not attempted.  Transfers - CGA  Ambulation - 3 feet CGA    Vitals: WNL    Pain: Pt did not express pain; pt expressed not feeling well overall  Education: Provided education on importance of mobility and skilled verbal / tactile cueing throughout intervention.     Assessment: Jaja Carcamo presents with functional mobility impairments which indicate the need for skilled intervention. Pt requested to use BSC upon PT entry. Pt required CGA during transfers and ambulation to BS. Pt declined further ambulation and requested female to assist with pericare. Pt educated on seated ther ex throughout day. Tolerating session today without incident. Will continue to follow and progress as tolerated.     Plan/Recommendations:   Low Intensity Therapy recommended post-acute care - This is recommended as therapy feels this patient would require 2-3 visits per week. OP or HH would be the best option depending on patient's home bound status. Consider, if the patient has other  \"skilled\" needs such as wounds, IV antibiotics, etc. Combined with \"low intensity\" could also equate to a SNF. If patient is medically complex, consider LTAC.. Pt requires no DME at discharge.     Pt desires Home with Home Health at discharge. Pt cooperative; agreeable to therapeutic recommendations and plan of care.         Basic Mobility 6-click:  Rollin = Total, A lot = 2, A little = 3; 4 = None  Supine>Sit:   1 = Total, A lot = 2, A little = 3; 4 = None   Sit>Stand with arms:  1 = Total, A lot = 2, A little = 3; 4 = None  Bed>Chair:   1 = Total, A lot = 2, A little = 3; 4 = None  Ambulate in room:  1 = Total, A lot = 2, A little = 3; 4 = None  3-5 Steps with railin = Total, A lot = 2, A little = 3; 4 = None  Score: 17    Modified [place]: N/A = No pre-op stroke/TIA    Post-Tx Position: Call light and personal " items within reach; Pt seated on BSC   PPE: gloves, surgical mask, eyewear protection

## 2022-08-15 NOTE — PLAN OF CARE
Goal Outcome Evaluation:   Pt complaining of shortness of breath during beginning of shift, improvement when sitting up in chair vs laying in bed. HR medications held this AM due to bradycardia, cardio aware and ordered one time dose IV lasix. PT recommending rehab and pt currently refusing, will continue to monitor

## 2022-08-15 NOTE — PLAN OF CARE
Goal Outcome Evaluation:      Jaja Carcamo presents with functional mobility impairments which indicate the need for skilled intervention. Pt requested to use BSC upon PT entry. Pt required CGA during transfers and ambulation to BSC. Pt declined further ambulation and requested female to assist with pericare. Pt educated on seated ther ex throughout day. Tolerating session today without incident. Will continue to follow and progress as tolerated.

## 2022-08-15 NOTE — PLAN OF CARE
Problem: Adult Inpatient Plan of Care  Goal: Plan of Care Review  Outcome: Adequate for Care Transition  Goal: Patient-Specific Goal (Individualized)  Outcome: Adequate for Care Transition  Goal: Absence of Hospital-Acquired Illness or Injury  Outcome: Adequate for Care Transition  Intervention: Identify and Manage Fall Risk  Recent Flowsheet Documentation  Taken 8/15/2022 0400 by Claire Swann RN  Safety Promotion/Fall Prevention: safety round/check completed  Taken 8/14/2022 2002 by Claire Swann RN  Safety Promotion/Fall Prevention: safety round/check completed  Intervention: Prevent Skin Injury  Recent Flowsheet Documentation  Taken 8/15/2022 0400 by Claire Swann RN  Body Position: position changed independently  Taken 8/14/2022 2002 by Claire Swann RN  Body Position: position changed independently  Skin Protection: adhesive use limited  Intervention: Prevent and Manage VTE (Venous Thromboembolism) Risk  Recent Flowsheet Documentation  Taken 8/15/2022 0400 by Claire Swann RN  Activity Management: activity encouraged  Taken 8/14/2022 2002 by Claire Swann RN  Activity Management: activity adjusted per tolerance  VTE Prevention/Management:   bilateral   sequential compression devices off  Range of Motion: ROM (range of motion) performed  Intervention: Prevent Infection  Recent Flowsheet Documentation  Taken 8/15/2022 0400 by Claire Swann RN  Infection Prevention: hand hygiene promoted  Taken 8/14/2022 2200 by Claire Swann RN  Infection Prevention: hand hygiene promoted  Taken 8/14/2022 2002 by Claire Swann RN  Infection Prevention: hand hygiene promoted  Goal: Optimal Comfort and Wellbeing  Outcome: Adequate for Care Transition  Intervention: Monitor Pain and Promote Comfort  Recent Flowsheet Documentation  Taken 8/14/2022 2002 by Claire Swann RN  Pain Management Interventions: see MAR  Intervention: Provide Person-Centered Care  Recent  Flowsheet Documentation  Taken 8/14/2022 2002 by Claire Swann, RN  Trust Relationship/Rapport: care explained  Goal: Readiness for Transition of Care  Outcome: Adequate for Care Transition   Goal Outcome Evaluation:

## 2022-08-15 NOTE — THERAPY EVALUATION
Patient Name: Jaja Carcamo  : 1945    MRN: [id number]                              Today's Date: 8/15/2022       Admit Date: 2022    Visit Dx:     ICD-10-CM ICD-9-CM   1. Dyspnea on exertion  R06.00 786.09   2. NSTEMI (non-ST elevated myocardial infarction) ([facility])  I21.4 410.70   3. Pleural effusion, bilateral  J90 511.9   4. Elevated troponin  R77.8 790.6     Patient Active Problem List   Diagnosis   • Right wrist fracture   • Dyspnea on exertion   • Elevated troponin   • COVID-19 virus detected   • COPD (chronic obstructive pulmonary disease) ([facility])   • NSTEMI (non-ST elevated myocardial infarction) ([facility])     Past Medical History:   Diagnosis Date   • Cancer (HCC)     lung   • COPD (chronic obstructive pulmonary disease) (HCC)    • History of appendectomy    • Hypertension    • Renal disease    • Hartman syndrome      Past Surgical History:   Procedure Laterality Date   • BREAST SURGERY Left     cysts rmeoved   • CLOSED REDUCTION WRIST FRACTURE Right 3/1/2022    Procedure: WRIST CLOSED REDUCTION;  Surgeon: Gaudencio Townsend MD;  Location: [facility];  Service: Orthopedics;  Laterality: Right;   • CYSTOSCOPY     • HYSTERECTOMY     • LUNG LOBECTOMY Right    • TRANSPLANTATION RENAL        General Information     Row Name 08/15/22 1027          General Information    Prior Level of Function independent:;all household mobility;ADL's;bed mobility;min assist:;home management  Pt states she uses a RW in the community, does not drive, and uses microwave to cook. Has some difficulty managing house cleaning. Son is supportive but he works. She takes sponge baths because she is afraid to fall in her shower.  -     Existing Precautions/Restrictions fall;oxygen therapy device and L/min  uses 2L at home  -     Barriers to Rehab previous functional deficit;medically complex  -     Row Name 08/15/22 1027          Living Environment    People in Home alone  -     Row Name 08/15/22 1027          Home Main Entrance     Number of Stairs, Main Entrance one  one step into her kitchen. Son has to assist her up several steps when she wants to utilize her porch. Pt does not leave the home except for MD appointments per son.  -[facility] Name 08/15/22 1027          Stairs Within Home, Primary    Stairs, Within Home, Primary does not utilize basement since her spouse passed away last December.  -     Number of Stairs, Within Home, Primary none  -[facility] Name 08/15/22 1027          Cognition    Orientation Status (Cognition) oriented x 3  -[facility] Name 08/15/22 1027          Safety Issues, Functional Mobility    Safety Issues Affecting Function (Mobility) insight into deficits/self-awareness;judgment;problem-solving  -     Impairments Affecting Function (Mobility) balance;endurance/activity tolerance;shortness of breath;strength;range of motion (ROM)  -           User Key  (r) = Recorded By, (t) = Taken By, (c) = Cosigned By    Initials Name Provider Type     Theresa Lazaro, OT Occupational Therapist                 Mobility/ADL's     [facility] Name 08/15/22 1031          Bed Mobility    Comment, (Bed Mobility) up on [facility] upon OT arrival  -[facility] Name 08/15/22 1031          Transfers    Transfers stand-sit transfer  -     Sit-Stand [place] (Transfers) set up;standby assist  -MH     Row Name 08/15/22 1031          Sit-Stand Transfer    Comment, (Sit-Stand Transfer) sun forward and holds to furniture  -[facility] Name 08/15/22 1031          Functional Mobility    Functional Mobility- Comment Pt states she has not had the energy to walk in the room since admission.  -[facility] Name 08/15/22 1031          Activities of Daily Living    BADL Assessment/Intervention toileting;upper body dressing;feeding  -[facility] Name 08/15/22 1031          Lower Body Dressing Assessment/Training    [place] Level (Lower Body Dressing) don;socks;dependent (less than 25% patient effort)  -[facility] Name 08/15/22 1031           Toileting Assessment/Training    [place] Level (Toileting) adjust/manage clothing;perform perineal hygiene;dependent (less than 25% patient effort)  -     Comment, (Toileting) Pt states she is not able to let go of the [facility] rails to clean herself. Skin is sore, red on disha area though no open places apparent. Pt is encouraged ot be up and try to walk but declines, reporting fatigue.  -     Row Name 08/15/22 1031          Upper Body Dressing Assessment/Training    [place] Level (Upper Body Dressing) doff;don;front opening garment;maximum assist (25% patient effort)  -     Position (Upper Body Dressing) supported sitting  -     Comment, (Upper Body Dressing) gown change  -     Row Name 08/15/22 1031          Self-Feeding Assessment/Training    [place] Level (Feeding) prepare tray/open items;maximum assist (25% patient effort)  -     Comment, (Feeding) Pt unable to open items on her tray.  -           User Key  (r) = Recorded By, (t) = Taken By, (c) = Cosigned By    Initials Name Provider Type     Theresa Lazaro OT Occupational Therapist               Obj/Interventions     Row Name 08/15/22 1036          Sensory Assessment (Somatosensory)    Sensory Assessment (Somatosensory) sensation intact  -[facility] Name 08/15/22 1036          Vision Assessment/Intervention    Visual Impairment/Limitations corrective lenses full-time  -[facility] Name 08/15/22 1036          Range of Motion Comprehensive    Comment, General Range of Motion shld flex limited 25%  -[facility] Name 08/15/22 1036          Strength Comprehensive (MMT)    Comment, General Manual Muscle Testing (MMT) Assessment BUE 3_/5; BLE 3+/5  -     Row Name 08/15/22 1036          Balance    Balance Assessment sitting static balance;sitting dynamic balance;standing static balance;standing dynamic balance  -     Static Sitting Balance independent  -     Dynamic Sitting Balance modified independence  -     Static Standing Balance  standby assist  -     Dynamic Standing Balance standby assist  for pivot transfer  -     Position/Device Used, Standing Balance supported  -           User Key  (r) = Recorded By, (t) = Taken By, (c) = Cosigned By    Initials Name Provider Type     Theresa Lazaro OT Occupational Therapist               Goals/Plan     Row Name 08/15/22 1418          Bed Mobility Goal 1 (OT)    Activity/Assistive Device (Bed Mobility Goal 1, OT) bed mobility activities, all  -     [place] Level/Cues Needed (Bed Mobility Goal 1, OT) independent  -     Time Frame (Bed Mobility Goal 1, OT) 2 weeks  -     Row Name 08/15/22 1418          Bathing Goal 1 (OT)    Activity/Device (Bathing Goal 1, OT) bathing skills, all  -     [place] Level/Cues Needed (Bathing Goal 1, OT) set-up required  -     Time Frame (Bathing Goal 1, OT) 2 weeks  -     Row Name 08/15/22 1418          Toileting Goal 1 (OT)    Activity/Device (Toileting Goal 1, OT) toileting skills, all;grab bar/safety frame;raised toilet seat  -     [place] Level/Cues Needed (Toileting Goal 1, OT) modified independence  -     Time Frame (Toileting Goal 1, OT) 2 weeks  -     Row Name 08/15/22 1418          Therapy Assessment/Plan (OT)    Planned Therapy Interventions (OT) activity tolerance training;adaptive equipment training;BADL retraining;functional balance retraining;occupation/activity based interventions;patient/caregiver education/training;transfer/mobility retraining;ROM/therapeutic exercise  -           User Key  (r) = Recorded By, (t) = Taken By, (c) = Cosigned By    Initials Name Provider Type    Theresa Vargas OT Occupational Therapist               Clinical Impression     Row Name 08/15/22 1038          Pain Assessment    Pretreatment Pain Rating 5/10  -     Posttreatment Pain Rating 5/10  -     Pain Location generalized  -     Row Name 08/15/22 1038          Plan of Care Review    Plan of Care Reviewed With patient;gail   -     Progress no change  -     Outcome Evaluation Pt with Hx kidney transplant, lung CA s/p lobectomy, chronic home O2 use, is admitted w/ chest pain, severe fatigue and inability to care for self. PT has pleural effussion, COVID-19, NSTEMI. She requires assist currently with all ADL and has not walked in the roomin 4 days. She completes pivot transfers to the SBC and chair while holding to furniture, stooped. Pt is sncouraged to increase activity but pt reports she is not able due to her severe fatigue. Current D/C recommendation is SNF for rehab, pending progress. Son encourages her to consider this because she lives alone. Pt would prefer HHC. She will need to be more I/ADL (I) and mobile to safely d/c home.  -     Row Name 08/15/22 1038          Therapy Assessment/Plan (OT)    Rehab Potential (OT) good, to achieve stated therapy goals  -     Criteria for Skilled Therapeutic Interventions Met (OT) skilled treatment is necessary  -     Therapy Frequency (OT) 5 times/wk  -     Row Name 08/15/22 1038          Therapy Plan Review/Discharge Plan (OT)    Anticipated Discharge Disposition (OT) skilled nursing facility  -     Row Name 08/15/22 1038          Vital Signs    Pretreatment Heart Rate (beats/min) 51  -     Intratreatment Heart Rate (beats/min) 76  -     Posttreatment Heart Rate (beats/min) 55  -MH     O2 Delivery Pre Treatment supplemental O2  -     O2 Delivery Intra Treatment supplemental O2  -     O2 Delivery Post Treatment supplemental O2  -     Pre Patient Position Sitting  -     Intra Patient Position Standing  -     Post Patient Position Sitting  -     Row Name 08/15/22 1038          Positioning and Restraints    Pre-Treatment Position bedside commode  -     Post Treatment Position chair  -     In Chair notified nsg;sitting;call light within reach;encouraged to call for assist;exit alarm on;with family/caregiver  -           User Key  (r) = Recorded By, (t) = Taken  By, (c) = Cosigned By    Initials Name Provider Type     Theresa Lazaro OT Occupational Therapist               Outcome Measures     Row Name 08/15/22 1419          How much help from another is currently needed...    Putting on and taking off regular lower body clothing? 2  -MH     Bathing (including washing, rinsing, and drying) 2  -MH     Toileting (which includes using toilet bed pan or urinal) 2  -MH     Putting on and taking off regular upper body clothing 2  -MH     Taking care of personal grooming (such as brushing teeth) 3  -MH     Eating meals 3  -     AM-PAC 6 Clicks Score (OT) 14  -MH     Row Name 08/15/22 1419 08/15/22 0400       How much help from another person do you currently need...    Turning from your back to your side while in flat bed without using bedrails? 3  - 3  -DH    Moving from lying on back to sitting on the side of a flat bed without bedrails? 2  - 3  -DH    Moving to and from a bed to a chair (including a wheelchair)? 3  - 3  -DH    Standing up from a chair using your arms (e.g., wheelchair, bedside chair)? 3  - 3  -DH    Climbing 3-5 steps with a railing? 1  - 3  -DH    To walk in hospital room? 2  - 3  -DH    AM-PAC 6 Clicks Score (PT) 14  -MH 18  -DH    Highest level of mobility 4 --> Transferred to chair/commode  - 6 --> Walked 10 steps or more  -    Row Name 08/15/22 1419          Functional Assessment    Outcome Measure Options AM-PAC 6 Clicks Daily Activity (OT);AM-PAC 6 Clicks Basic Mobility (PT)  -           User Key  (r) = Recorded By, (t) = Taken By, (c) = Cosigned By    Initials Name Provider Type     Theresa Lazaro OT Occupational Therapist     Claire Swann RN Registered Nurse                Occupational Therapy Education                 Title: PT OT SLP Therapies (In Progress)     Topic: Occupational Therapy (In Progress)     Point: ADL training (In Progress)     Description:   Instruct learner(s) on proper safety adaptation and  remediation techniques during self care or transfers.   Instruct in proper use of assistive devices.              Learning Progress Summary           Patient Nonacceptance, E, NR by  at 8/15/2022 1420                   Point: Home exercise program (In Progress)     Description:   Instruct learner(s) on appropriate technique for monitoring, assisting and/or progressing therapeutic exercises/activities.              Learning Progress Summary           Patient Nonacceptance, E, NR by  at 8/15/2022 1420                   Point: Precautions (In Progress)     Description:   Instruct learner(s) on prescribed precautions during self-care and functional transfers.              Learning Progress Summary           Patient Nonacceptance, E, NR by  at 8/15/2022 1420                   Point: Body mechanics (In Progress)     Description:   Instruct learner(s) on proper positioning and spine alignment during self-care, functional mobility activities and/or exercises.              Learning Progress Summary           Patient Nonacceptance, E, NR by  at 8/15/2022 1420                               User Key     Initials Effective Dates Name Provider Type Discipline     06/16/21 -  Theresa Lazaro OT Occupational Therapist OT              OT Recommendation and Plan  Planned Therapy Interventions (OT): activity tolerance training, adaptive equipment training, BADL retraining, functional balance retraining, occupation/activity based interventions, patient/caregiver education/training, transfer/mobility retraining, ROM/therapeutic exercise  Therapy Frequency (OT): 5 times/wk  Plan of Care Review  Plan of Care Reviewed With: patient, son  Progress: no change  Outcome Evaluation: Pt with Hx kidney transplant, lung CA s/p lobectomy, chronic home O2 use, is admitted w/ chest pain, severe fatigue and inability to care for self. PT has pleural effussion, COVID-19, NSTEMI. She requires assist currently with all ADL and has not walked in  the roomin 4 days. She completes pivot transfers to the SBC and chair while holding to furniture, stooped. Pt is sncouraged to increase activity but pt reports she is not able due to her severe fatigue. Current D/C recommendation is SNF for rehab, pending progress. Son encourages her to consider this because she lives alone. Pt would prefer C. She will need to be more I/ADL (I) and mobile to safely d/c home.     Time Calculation:    Time Calculation- OT     Row Name 08/15/22 1421             Time Calculation- OT    OT Start Time 0926  -      OT Stop Time 0954  -      OT Time Calculation (min) 28 min  -      Total Timed Code Minutes- OT 10 minute(s)  -      OT Received On 08/15/22  -      OT - Next Appointment 08/16/22  -      OT Goal Re-Cert Due Date 08/29/22  -            User Key  (r) = Recorded By, (t) = Taken By, (c) = Cosigned By    Initials Name Provider Type     Theresa Lazaro OT Occupational Therapist              Therapy Charges for Today     Code Description Service Date Service Provider Modifiers Qty    [id number]  OT SELF CARE/MGMT/TRAIN EA 15 MIN 8/15/2022 Theresa Lazaro OT GO 1    [id number]  OT EVAL MOD COMPLEXITY 3 8/15/2022 Theresa Lazaro OT GO 1               Theresa Lazaro OT  8/15/2022

## 2022-08-15 NOTE — PROGRESS NOTES
[facility] Daily Progress Note    Patient Name: Jaja Carcamo  : 1945  MRN: [id number]  Primary Care Physician:  Gary Bunch MD  Date of admission: 2022      Subjective      Chief Complaint: Chest pain and shortness of breath      Patient Reports     22: Tmax 100.9F. Complains of chronic back pain.  Vaccinated.  Planned LHC this afternoon.  On heparin drip.  Home oxygen.    22: Patient in A. fib this morning.  Started on amiodarone drip and Eliquis.  Complains of diarrhea.  Lives alone.  On home oxygen.    22: Complains of being nauseous.  Amiodarone changed to oral.    8/15/22: son at the bedside. C/O SOA, nausea and diarrhea. Does not want to go to rehab. Bradycardia on tele    Review of Systems   All other systems reviewed and are negative.         Objective      Vitals:   Temp:  [96.4 °F (35.8 °C)] 96.4 °F (35.8 °C)  Heart Rate:  [56-67] 67  Resp:  [20] 20  BP: (122-144)/(51-67) 126/55  Flow (L/min):  [2] 2    Physical Exam  HENT:      Head: Normocephalic.      Mouth/Throat:      Mouth: Mucous membranes are moist.   Eyes:      General: No scleral icterus.     Extraocular Movements: Extraocular movements intact.      Pupils: Pupils are equal, round, and reactive to light.   Cardiovascular:      Rate and Rhythm: Normal rate and regular rhythm.   Pulmonary:      Effort: Pulmonary effort is normal.   Abdominal:      General: Bowel sounds are normal.   Musculoskeletal:         General: Normal range of motion.      Cervical back: Normal range of motion.   Skin:     General: Skin is warm.   Neurological:      Mental Status: She is alert. Mental status is at baseline.   Psychiatric:         Mood and Affect: Mood normal.             Result Review    Result Review:  I have personally reviewed the results from the time of this admission to 8/15/2022 16:41 EDT and agree with these findings:  [x]  Laboratory  []  Microbiology  [x]  Radiology  []   EKG/Telemetry   []  Cardiology/Vascular   []  Pathology  [x]  Old records  []  Other:            Assessment & Plan      Brief Patient Summary:    Patient is a 77-year-old vaccinated female with history of renal transplant, chronic hypoxemic respiratory failure on home oxygen, anxiety on diazepam, hypothyroidism, chronic pain on hydrocodone and hyperlipidemia.    The patient came to the ED on 8/11/2022 complaining of about 1 week history of shortness of air.  She had been evaluated at [facility] on 8/8/2022 for shortness of air   CTA of the chest in the ED ruled out pulmonary embolism.  It showed small pleural effusions and severe central global emphysema, right lobectomy and severe renal atrophy.    The patient was then admitted to PCU.  She was evaluated by cardiologist regarding elevated troponins.   Nephrologist followed the patient for history of renal transplant and to prepare patient for left heart catheterization.  The patient underwent LHC on 8/12/2022 and it showed nonobstructive CAD.  TTE showed LVEF 55% and moderate MR. The patient had new onset atrial fibrillation on 8/13/2022 therefore was started on Eliquis and amiodarone drip.       amiodarone, 200 mg, Oral, Q24H  apixaban, 5 mg, Oral, Q12H  atorvastatin, 10 mg, Oral, Daily  azaTHIOprine, 50 mg, Oral, BID  DULoxetine, 40 mg, Oral, Daily  levothyroxine, 100 mcg, Oral, Once per day on Sun Sat  levothyroxine, 50 mcg, Oral, Daily  metoprolol succinate XL, 50 mg, Oral, Daily  potassium chloride, 20 mEq, Oral, Daily  predniSONE, 10 mg, Oral, Daily  [START ON 8/17/2022] predniSONE, 5 mg, Oral, Daily With Breakfast  remdesivir, 100 mg, Intravenous, Q24H  sodium chloride, 10 mL, Intravenous, Q12H  tacrolimus, 1 mg, Oral, BID       Pharmacy Consult - Remdesivir,          Active Hospital Problems:  Active Hospital Problems    Diagnosis    • **Dyspnea on exertion    • Elevated troponin    • COVID-19 virus detected    • COPD (chronic obstructive  pulmonary disease) (HCC)    • NSTEMI (non-ST elevated myocardial infarction) ([facility])      Added automatically from request for surgery [id number]       NSTEMI:  -Cardiology consulted  -s/p C 8/12/2022--> nonobstructive CAD    COVID-19 pneumonia:  -Vaccinated patient  -Continue remdesivir, prednisone and bronchodilators    New atrial fibrillation:  -Started Eliquis and amiodarone drip 8/13/202  -Amiodarone changed to oral 8/14/2022    History of renal transplant:  -Continue Imuran and Prograf prednisone  -Nephrology following    Chronic hypoxemic respiratory failure:  -On 3L at baseline  -Titrate pulse ox> 92%    Chronic back pain:  -Norco PRN    History of neuroendocrine carcinoma of lung (2016)  -s/p right lower lobe resection        DVT prophylaxis:  Medical DVT prophylaxis orders are present.    CODE STATUS:    Code Status (Patient has no pulse and is not breathing): CPR (Attempt to Resuscitate)  Medical Interventions (Patient has pulse or is breathing): Full Support      Disposition:  I expect patient to be discharged home.    This patient has been examined wearing appropriate Personal Protective Equipment and discussed with nursing. 08/15/22      Electronically signed by Clyde White DO, 08/15/22, 16:41 EDT.  [facility]ist Team

## 2022-08-15 NOTE — PROGRESS NOTES
"                                                                                                                                      Nephrology  Progress Note                                        [facility]    Patient Identification    Name: Jaja Carcamo  Age: 77 y.o.  Sex: female  :  1945  MRN: [id number]      DATE OF SERVICE:  8/15/2022        Subective    No new complaints        Objective   Scheduled Meds:amiodarone, 200 mg, Oral, Q24H  apixaban, 5 mg, Oral, Q12H  atorvastatin, 10 mg, Oral, Daily  azaTHIOprine, 50 mg, Oral, BID  DULoxetine, 40 mg, Oral, Daily  levothyroxine, 100 mcg, Oral, Once per day on Sun Sat  levothyroxine, 50 mcg, Oral, Daily  metoprolol succinate XL, 50 mg, Oral, Daily  potassium chloride, 20 mEq, Oral, Daily  predniSONE, 10 mg, Oral, Daily  [START ON 2022] predniSONE, 5 mg, Oral, Daily With Breakfast  remdesivir, 100 mg, Intravenous, Q24H  sodium chloride, 10 mL, Intravenous, Q12H  tacrolimus, 1 mg, Oral, BID          Continuous Infusions:Pharmacy Consult - Remdesivir,         PRN Meds:•  acetaminophen **OR** acetaminophen **OR** acetaminophen  •  albuterol sulfate HFA  •  diazePAM  •  diphenhydrAMINE  •  HYDROcodone-acetaminophen  •  loperamide  •  ondansetron **OR** ondansetron  •  Pharmacy Consult - Remdesivir  •  [COMPLETED] Insert peripheral IV **AND** sodium chloride  •  sodium chloride     Exam:  /51   Pulse 56   Temp 98 °F (36.7 °C) (Oral)   Resp 20   Ht 170.2 cm (67\")   Wt 81.3 kg (179 lb 3.7 oz)   SpO2 99%   BMI 28.07 kg/m²     Intake/Output last 3 shifts:  I/O last 3 completed shifts:  In: 240 [P.O.:240]  Out: 700 [Urine:700]    Intake/Output this shift:  No intake/output data recorded.    Physical exam:  General Appearance:  Alert  Head:  Normocephalic, without obvious abnormality, atraumatic  Eyes:  PERRL, conjunctiva/corneas clear     Neck:  Supple,  no adenopathy;      Lungs:  Decreased BS occasion ronchi  Heart:  Regular " rate and rhythm, S1 and S2 normal  Abdomen:  Soft, non-tender, bowel sounds active   Extremities: trace edema  Pulses: 2+ and symmetric all extremities  Skin:  No rashes or lesions       Data Review:  All labs (24hrs):   Recent Results (from the past 24 hour(s))   ECG 12 Lead    Collection Time: 08/14/22 10:16 AM   Result Value Ref Range    QT Interval 484 ms   Hepatic Function Panel    Collection Time: 08/15/22  3:22 AM    Specimen: Blood   Result Value Ref Range    Total Protein 5.2 (L) 6.0 - 8.5 g/dL    Albumin 3.40 (L) 3.50 - 5.20 g/dL    ALT (SGPT) 18 1 - 33 U/L    AST (SGOT) 23 1 - 32 U/L    Alkaline Phosphatase 46 39 - 117 U/L    Total Bilirubin 0.7 0.0 - 1.2 mg/dL    Bilirubin, Direct 0.2 0.0 - 0.3 mg/dL    Bilirubin, Indirect 0.5 mg/dL   Basic Metabolic Panel    Collection Time: 08/15/22  3:22 AM    Specimen: Blood   Result Value Ref Range    Glucose 96 65 - 99 mg/dL    BUN 15 8 - 23 mg/dL    Creatinine 0.59 0.57 - 1.00 mg/dL    Sodium 138 136 - 145 mmol/L    Potassium 4.4 3.5 - 5.2 mmol/L    Chloride 104 98 - 107 mmol/L    CO2 26.0 22.0 - 29.0 mmol/L    Calcium 9.2 8.6 - 10.5 mg/dL    BUN/Creatinine Ratio 25.4 (H) 7.0 - 25.0    Anion Gap 8.0 5.0 - 15.0 mmol/L    eGFR 94.1 >60.0 mL/min/1.73   CBC Auto Differential    Collection Time: 08/15/22  3:22 AM    Specimen: Blood   Result Value Ref Range    WBC 2.80 (L) 3.40 - 10.80 10*3/mm3    RBC 3.53 (L) 3.77 - 5.28 10*6/mm3    Hemoglobin 11.2 (L) 12.0 - 15.9 g/dL    Hematocrit 33.4 (L) 34.0 - 46.6 %    MCV 94.6 79.0 - 97.0 fL    MCH 31.7 26.6 - 33.0 pg    MCHC 33.5 31.5 - 35.7 g/dL    RDW 14.8 12.3 - 15.4 %    RDW-SD 49.4 37.0 - 54.0 fl    MPV 7.5 6.0 - 12.0 fL    Platelets 97 (L) 140 - 450 10*3/mm3    Neutrophil % 57.2 42.7 - 76.0 %    Lymphocyte % 26.2 19.6 - 45.3 %    Monocyte % 16.5 (H) 5.0 - 12.0 %    Eosinophil % 0.0 (L) 0.3 - 6.2 %    Basophil % 0.1 0.0 - 1.5 %    Neutrophils, Absolute 1.60 (L) 1.70 - 7.00 10*3/mm3    Lymphocytes, Absolute 0.70 0.70 - 3.10  10*3/mm3    Monocytes, Absolute 0.50 0.10 - 0.90 10*3/mm3    Eosinophils, Absolute 0.00 0.00 - 0.40 10*3/mm3    Basophils, Absolute 0.00 0.00 - 0.20 10*3/mm3    nRBC 0.5 (H) 0.0 - 0.2 /100 WBC          Imaging:  XR Chest 1 View    Result Date: 8/11/2022  Moderate cardiomegaly with features of interstitial edema and small bilateral pleural effusions. Probable mild left basilar atelectasis.  Electronically Signed By-Karie Bustos MD On:8/11/2022 4:28 PM This report was finalized on [id number] by  Karie Bustos MD.    CT Angiogram Chest Pulmonary Embolism    Result Date: 8/12/2022  1. No pulmonary embolus. 2. Small pleural effusions. 3. Severe centrilobular emphysema but no airspace consolidation. 4. Right lobectomy. 5. Severe renal atrophy Electronically signed by:  Zach Zendejas M.D.  8/11/2022 11:29 PM      Assessment/Plan:     Dyspnea on exertion    Elevated troponin    COVID-19 virus detected    COPD (chronic obstructive pulmonary disease) (HCC)    NSTEMI (non-ST elevated myocardial infarction) (HCC)       Kidney transplant with no signs of infection rejection or side effects              -Imuran and prednisone and Prograf  Shortness of breath  COVID-19 infection  Elevated troponin  COPD    Creatinine stable  Status post cardiac cath  Mucomyst given  Renal wise stable  Continue immunosuppression

## 2022-08-15 NOTE — DISCHARGE PLACEMENT REQUEST
"Gene Carcamo (77 y.o. Female)             Date of Birth   1945    Social Security Number       Address   [address] [place] [address] [place] [address]    Home Phone   [phone]    MRN   [id number]       Congregational   None    Marital Status                               Admission Date   8/11/22    Admission Type   Emergency    Admitting Provider   Jak Gavin MD    Attending Provider   Clyde White DO    Department, Room/Bed   [facility], 2104/1       Discharge Date       Discharge Disposition       Discharge Destination                               Attending Provider: Clyde White DO    Allergies: Zolpidem    Isolation: Enh Drop/Con, Contact   Infection: ESBL E coli (11/08/20), COVID (confirmed) (08/11/22)   Code Status: CPR   Advance Care Planning Activity    Ht: 170.2 cm (67\")   Wt: 81.3 kg (179 lb 3.7 oz)    Admission Cmt: None   Principal Problem: Dyspnea on exertion [R06.00]                 Active Insurance as of 8/11/2022     Primary Coverage     Payor Plan Insurance Group Employer/Plan Group    MEDICARE MEDICARE A & B      Payor Plan Address Payor Plan Phone Number Payor Plan Fax Number Effective Dates    [address] [phone]  1/1/2012 - None Entered    [facility] [id number]       Subscriber Name Subscriber Birth Date Member ID       GENE CARCAMO 1945 [id number]           Secondary Coverage     Payor Plan Insurance Group Employer/Plan Group    [facility] [id number]     Payor Plan Address Payor Plan Phone Number Payor Plan Fax Number Effective Dates    [address]   1/1/2022 - None Entered    [facility] [id number]       Subscriber Name Subscriber Birth Date Member ID       GENE CARCAMO 1945 [id number]                 Emergency Contacts      (Rel.) Home Phone Work Phone Mobile Phone    RENNY CARCAMO (Son) [phone] -- [phone]    goldy potter (Grandchild) -- -- [phone]              "

## 2022-08-16 ENCOUNTER — HOME HEALTH ADMISSION (OUTPATIENT)
Dept: HOME HEALTH SERVICES | Facility: HOME HEALTHCARE | Age: 77
End: 2022-08-16

## 2022-08-16 ENCOUNTER — READMISSION MANAGEMENT (OUTPATIENT)
Dept: CALL CENTER | Facility: HOSPITAL | Age: 77
End: 2022-08-16

## 2022-08-16 VITALS
HEIGHT: 67 IN | HEART RATE: 56 BPM | TEMPERATURE: 98.2 F | OXYGEN SATURATION: 96 % | SYSTOLIC BLOOD PRESSURE: 134 MMHG | DIASTOLIC BLOOD PRESSURE: 56 MMHG | WEIGHT: 186.73 LBS | RESPIRATION RATE: 18 BRPM | BODY MASS INDEX: 29.31 KG/M2

## 2022-08-16 PROBLEM — D89.831 CYTOKINE RELEASE SYNDROME, GRADE 1: Status: ACTIVE | Noted: 2022-08-16

## 2022-08-16 LAB
ANION GAP SERPL CALCULATED.3IONS-SCNC: 9 MMOL/L (ref 5–15)
BASOPHILS # BLD AUTO: 0 10*3/MM3 (ref 0–0.2)
BASOPHILS NFR BLD AUTO: 0.6 % (ref 0–1.5)
BUN SERPL-MCNC: 16 MG/DL (ref 8–23)
BUN/CREAT SERPL: 26.2 (ref 7–25)
CALCIUM SPEC-SCNC: 9.6 MG/DL (ref 8.6–10.5)
CHLORIDE SERPL-SCNC: 105 MMOL/L (ref 98–107)
CO2 SERPL-SCNC: 26 MMOL/L (ref 22–29)
CREAT SERPL-MCNC: 0.61 MG/DL (ref 0.57–1)
DEPRECATED RDW RBC AUTO: 48.1 FL (ref 37–54)
EGFRCR SERPLBLD CKD-EPI 2021: 93.4 ML/MIN/1.73
EOSINOPHIL # BLD AUTO: 0 10*3/MM3 (ref 0–0.4)
EOSINOPHIL NFR BLD AUTO: 0.2 % (ref 0.3–6.2)
ERYTHROCYTE [DISTWIDTH] IN BLOOD BY AUTOMATED COUNT: 14.6 % (ref 12.3–15.4)
GLUCOSE SERPL-MCNC: 88 MG/DL (ref 65–99)
HCT VFR BLD AUTO: 33.5 % (ref 34–46.6)
HGB BLD-MCNC: 11.2 G/DL (ref 12–15.9)
LYMPHOCYTES # BLD AUTO: 0.6 10*3/MM3 (ref 0.7–3.1)
LYMPHOCYTES NFR BLD AUTO: 25.2 % (ref 19.6–45.3)
MCH RBC QN AUTO: 31.5 PG (ref 26.6–33)
MCHC RBC AUTO-ENTMCNC: 33.4 G/DL (ref 31.5–35.7)
MCV RBC AUTO: 94.3 FL (ref 79–97)
MONOCYTES # BLD AUTO: 0.5 10*3/MM3 (ref 0.1–0.9)
MONOCYTES NFR BLD AUTO: 19.5 % (ref 5–12)
NEUTROPHILS NFR BLD AUTO: 1.3 10*3/MM3 (ref 1.7–7)
NEUTROPHILS NFR BLD AUTO: 54.5 % (ref 42.7–76)
NRBC BLD AUTO-RTO: 0.4 /100 WBC (ref 0–0.2)
PLATELET # BLD AUTO: 101 10*3/MM3 (ref 140–450)
PMV BLD AUTO: 8.1 FL (ref 6–12)
POTASSIUM SERPL-SCNC: 4.5 MMOL/L (ref 3.5–5.2)
QT INTERVAL: 333 MS
RBC # BLD AUTO: 3.56 10*6/MM3 (ref 3.77–5.28)
SODIUM SERPL-SCNC: 140 MMOL/L (ref 136–145)
WBC NRBC COR # BLD: 2.4 10*3/MM3 (ref 3.4–10.8)

## 2022-08-16 PROCEDURE — 97116 GAIT TRAINING THERAPY: CPT

## 2022-08-16 PROCEDURE — 99232 SBSQ HOSP IP/OBS MODERATE 35: CPT | Performed by: INTERNAL MEDICINE

## 2022-08-16 PROCEDURE — 97530 THERAPEUTIC ACTIVITIES: CPT

## 2022-08-16 PROCEDURE — 63710000001 PREDNISONE PER 5 MG: Performed by: INTERNAL MEDICINE

## 2022-08-16 PROCEDURE — 99238 HOSP IP/OBS DSCHRG MGMT 30/<: CPT | Performed by: HOSPITALIST

## 2022-08-16 PROCEDURE — 80197 ASSAY OF TACROLIMUS: CPT | Performed by: HOSPITALIST

## 2022-08-16 PROCEDURE — 80048 BASIC METABOLIC PNL TOTAL CA: CPT | Performed by: HOSPITALIST

## 2022-08-16 PROCEDURE — 97110 THERAPEUTIC EXERCISES: CPT

## 2022-08-16 PROCEDURE — 63710000001 AZATHIOPRINE PER 50 MG: Performed by: INTERNAL MEDICINE

## 2022-08-16 PROCEDURE — 63710000001 TACROLIMUS PER 1 MG: Performed by: INTERNAL MEDICINE

## 2022-08-16 PROCEDURE — 85025 COMPLETE CBC W/AUTO DIFF WBC: CPT | Performed by: HOSPITALIST

## 2022-08-16 RX ADMIN — HYDROCODONE BITARTRATE AND ACETAMINOPHEN 1 TABLET: 7.5; 325 TABLET ORAL at 09:14

## 2022-08-16 RX ADMIN — ACETAMINOPHEN 650 MG: 325 TABLET, FILM COATED ORAL at 09:13

## 2022-08-16 RX ADMIN — APIXABAN 5 MG: 5 TABLET, FILM COATED ORAL at 09:14

## 2022-08-16 RX ADMIN — TACROLIMUS 1 MG: 0.5 CAPSULE ORAL at 09:14

## 2022-08-16 RX ADMIN — METOPROLOL SUCCINATE 50 MG: 50 TABLET, EXTENDED RELEASE ORAL at 10:02

## 2022-08-16 RX ADMIN — LEVOTHYROXINE SODIUM 50 MCG: 0.05 TABLET ORAL at 05:12

## 2022-08-16 RX ADMIN — Medication 10 ML: at 09:14

## 2022-08-16 RX ADMIN — PREDNISONE 10 MG: 10 TABLET ORAL at 09:14

## 2022-08-16 RX ADMIN — HYDROCODONE BITARTRATE AND ACETAMINOPHEN 1 TABLET: 7.5; 325 TABLET ORAL at 14:49

## 2022-08-16 RX ADMIN — AZATHIOPRINE 50 MG: 50 TABLET ORAL at 09:14

## 2022-08-16 RX ADMIN — DULOXETINE 40 MG: 20 CAPSULE, DELAYED RELEASE ORAL at 09:14

## 2022-08-16 NOTE — PROGRESS NOTES
[facility] Medicine Services Daily Progress Note    Patient Name: Jaja Carcamo  : 1945  MRN: [id number]  Primary Care Physician:  Gary Bunch MD  Date of admission: 2022      Subjective      Chief Complaint: Chest pain and shortness of breath      Patient Reports     22: Tmax 100.9F. Complains of chronic back pain.  Vaccinated.  Planned LHC this afternoon.  On heparin drip.  Home oxygen.    22: Patient in A. fib this morning.  Started on amiodarone drip and Eliquis.  Complains of diarrhea.  Lives alone.  On home oxygen.    22: Complains of being nauseous.  Amiodarone changed to oral.    8/15/22: son at the bedside. C/O SOA, nausea and diarrhea. Does not want to go to rehab. Bradycardia on tele    22: Patient wants to go to rehab.    Review of Systems   All other systems reviewed and are negative.         Objective      Vitals:   Temp:  [96.4 °F (35.8 °C)-98.8 °F (37.1 °C)] 97.7 °F (36.5 °C)  Heart Rate:  [55-76] 67  Resp:  [16-20] 20  BP: (126-152)/(48-62) 133/48  Flow (L/min):  [2] 2    Physical Exam  HENT:      Head: Normocephalic.      Mouth/Throat:      Mouth: Mucous membranes are moist.   Eyes:      General: No scleral icterus.     Extraocular Movements: Extraocular movements intact.      Pupils: Pupils are equal, round, and reactive to light.   Cardiovascular:      Rate and Rhythm: Normal rate and regular rhythm.   Pulmonary:      Effort: Pulmonary effort is normal.   Abdominal:      General: Bowel sounds are normal.   Musculoskeletal:         General: Normal range of motion.      Cervical back: Normal range of motion.   Skin:     General: Skin is warm.   Neurological:      Mental Status: She is alert. Mental status is at baseline.   Psychiatric:         Mood and Affect: Mood normal.             Result Review    Result Review:  I have personally reviewed the results from the time of this admission to 2022 13:43 EDT and agree with these  findings:  [x]  Laboratory  []  Microbiology  [x]  Radiology  []  EKG/Telemetry   []  Cardiology/Vascular   []  Pathology  [x]  Old records  []  Other:            Assessment & Plan      Brief Patient Summary:    Patient is a 77-year-old vaccinated female with history of renal transplant, chronic hypoxemic respiratory failure on home oxygen, anxiety on diazepam, hypothyroidism, chronic pain on hydrocodone and hyperlipidemia.    The patient came to the ED on 8/11/2022 complaining of about 1 week history of shortness of air.  She had been evaluated at [facility] on 8/8/2022 for shortness of air   CTA of the chest in the ED ruled out pulmonary embolism.  It showed small pleural effusions and severe central global emphysema, right lobectomy and severe renal atrophy.    The patient was then admitted to PCU.  She was evaluated by cardiologist regarding elevated troponins.   Nephrologist followed the patient for history of renal transplant and to prepare patient for left heart catheterization.  The patient underwent LHC on 8/12/2022 and it showed nonobstructive CAD.  TTE showed LVEF 55% and moderate MR. The patient had new onset atrial fibrillation on 8/13/2022 therefore was started on Eliquis and amiodarone drip.       apixaban, 5 mg, Oral, Q12H  atorvastatin, 10 mg, Oral, Daily  azaTHIOprine, 50 mg, Oral, BID  DULoxetine, 40 mg, Oral, Daily  levothyroxine, 100 mcg, Oral, Once per day on Sun Sat  levothyroxine, 50 mcg, Oral, Daily  metoprolol succinate XL, 50 mg, Oral, Daily  potassium chloride, 20 mEq, Oral, Daily  [START ON 8/17/2022] predniSONE, 5 mg, Oral, Daily With Breakfast  sodium chloride, 10 mL, Intravenous, Q12H  tacrolimus, 1 mg, Oral, BID       Pharmacy Consult - Remdesivir,          Active Hospital Problems:  Active Hospital Problems    Diagnosis    • **Dyspnea on exertion    • Elevated troponin    • COVID-19 virus detected    • COPD (chronic obstructive pulmonary disease) (HCC)    • NSTEMI  (non-ST elevated myocardial infarction) (HCC)      Added automatically from request for surgery [id number]       NSTEMI:  -Cardiology consulted  -s/p C 8/12/2022--> nonobstructive CAD    COVID-19 pneumonia:  -Vaccinated patient  -Continue remdesivir, prednisone and bronchodilators    New atrial fibrillation:  -Started Eliquis and amiodarone drip 8/13/202  -Amiodarone changed to oral 8/14/2022    History of renal transplant:  -Continue Imuran and Prograf prednisone  -Nephrology following    Chronic hypoxemic respiratory failure:  -On 3L at baseline  -Titrate pulse ox> 92%    Chronic back pain:  -Norco PRN    History of neuroendocrine carcinoma of lung (2016)  -s/p right lower lobe resection        DVT prophylaxis:  Medical DVT prophylaxis orders are present.    CODE STATUS:    Code Status (Patient has no pulse and is not breathing): CPR (Attempt to Resuscitate)  Medical Interventions (Patient has pulse or is breathing): Full Support      Disposition:  I expect patient to be discharged home.    This patient has been examined wearing appropriate Personal Protective Equipment and discussed with nursing. 08/16/22      Electronically signed by Clyde White DO, 08/16/22, 13:43 EDT.  [facility] Hospitalist Team            not reported

## 2022-08-16 NOTE — PLAN OF CARE
Goal Outcome Evaluation:            Patient alert and oriented, very talkative this evening, O2@2liters via nc, continues HR 58-62 this shift, patient has rested well this shift, VSS.

## 2022-08-16 NOTE — PROGRESS NOTES
Spoke to patient   Patient is agreeable to [facility] services   Patient has walker and oxygen at home  Son and grand daughter help with groceries and errands       Dr Bunch will follow per [place]   Nursing and PT     Pt requests afternoon visits  She sleeps late   Encouraged her to answer phone in anticipation of [facility] scheduling visits with agreement

## 2022-08-16 NOTE — DISCHARGE PLACEMENT REQUEST
"Gene Carcamo (77 y.o. Female)             Date of Birth   1945    Social Security Number       Address   [address] [place] [address] [place] [address]    Home Phone   [phone]    MRN   [id number]       [employer]   None    Marital Status                               Admission Date   8/11/22    Admission Type   Emergency    Admitting Provider   Jak Gavin MD    Attending Provider   Clyde White DO    Department, Room/Bed   [facility], 2104/1       Discharge Date       Discharge Disposition       Discharge Destination                               Attending Provider: Clyde White DO    Allergies: Zolpidem    Isolation: Enh Drop/Con, Contact   Infection: ESBL E coli (11/08/20), COVID (confirmed) (08/11/22)   Code Status: CPR   Advance Care Planning Activity    Ht: 170.2 cm (67\")   Wt: 84.7 kg (186 lb 11.7 oz)    Admission Cmt: None   Principal Problem: Dyspnea on exertion [R06.00]                 Active Insurance as of 8/11/2022     Primary Coverage     Payor Plan Insurance Group Employer/Plan Group    MEDICARE MEDICARE A & B      Payor Plan Address Payor Plan Phone Number Payor Plan Fax Number Effective Dates    [address] [phone]  1/1/2012 - None Entered    [facility] [address]       Subscriber Name Subscriber Birth Date Member ID       GENE CARCAMO 1945 [id number]           Secondary Coverage     Payor Plan Insurance Group Employer/Plan Group    [facility] [id number]     Payor Plan Address Payor Plan Phone Number Payor Plan Fax Number Effective Dates    [address]   1/1/2022 - None Entered    [facility] [address]       Subscriber Name Subscriber Birth Date Member ID       GENE CARCAMO 1945 [id number]                 Emergency Contacts      (Rel.) Home Phone Work Phone Mobile Phone    RENNY CARCAMO (Son) [phone] -- [phone]    goldy potter (Grandchild) -- -- [phone]          "

## 2022-08-16 NOTE — PROGRESS NOTES
[facility] Medicine Services Daily Progress Note    Patient Name: Jaja Carcamo  : 1945  MRN: [id number]  Primary Care Physician:  Gary Bunch MD  Date of admission: 2022      Subjective      Chief Complaint: Chest pain and shortness of breath      Patient Reports     22: Tmax 100.9F. Complains of chronic back pain.  Vaccinated.  Planned LHC this afternoon.  On heparin drip.  Home oxygen.    22: Patient in A. fib this morning.  Started on amiodarone drip and Eliquis.  Complains of diarrhea.  Lives alone.  On home oxygen.    22: Complains of being nauseous.  Amiodarone changed to oral.    8/15/22: son at the bedside. C/O SOA, nausea and diarrhea. Does not want to go to rehab. Bradycardia on tele    22: DC home    Review of Systems   All other systems reviewed and are negative.         Objective      Vitals:   Temp:  [97.7 °F (36.5 °C)-98.8 °F (37.1 °C)] 97.7 °F (36.5 °C)  Heart Rate:  [55-76] 67  Resp:  [16-20] 20  BP: (133-152)/(48-62) 133/48  Flow (L/min):  [2] 2    Physical Exam  HENT:      Head: Normocephalic.      Mouth/Throat:      Mouth: Mucous membranes are moist.   Eyes:      General: No scleral icterus.     Extraocular Movements: Extraocular movements intact.      Pupils: Pupils are equal, round, and reactive to light.   Cardiovascular:      Rate and Rhythm: Normal rate and regular rhythm.   Pulmonary:      Effort: Pulmonary effort is normal.   Abdominal:      General: Bowel sounds are normal.   Musculoskeletal:         General: Normal range of motion.      Cervical back: Normal range of motion.   Skin:     General: Skin is warm.   Neurological:      Mental Status: She is alert. Mental status is at baseline.   Psychiatric:         Mood and Affect: Mood normal.             Result Review    Result Review:  I have personally reviewed the results from the time of this admission to 2022 15:03 EDT and agree with these findings:  [x]  Laboratory  []   Microbiology  [x]  Radiology  []  EKG/Telemetry   []  Cardiology/Vascular   []  Pathology  [x]  Old records  []  Other:            Assessment & Plan      Brief Patient Summary:    Patient is a 77-year-old vaccinated female with history of renal transplant, chronic hypoxemic respiratory failure on home oxygen, anxiety on diazepam, hypothyroidism, chronic pain on hydrocodone and hyperlipidemia.    The patient came to the ED on 8/11/2022 complaining of about 1 week history of shortness of air.  She had been evaluated at [facility] on 8/8/2022 for shortness of air   CTA of the chest in the ED ruled out pulmonary embolism.  It showed small pleural effusions and severe central global emphysema, right lobectomy and severe renal atrophy.    The patient was then admitted to PCU.  She was evaluated by cardiologist regarding elevated troponins.   Nephrologist followed the patient for history of renal transplant and to prepare patient for left heart catheterization.  The patient underwent LHC on 8/12/2022 and it showed nonobstructive CAD.  TTE showed LVEF 55% and moderate MR. The patient had new onset atrial fibrillation on 8/13/2022 therefore was started on Eliquis and amiodarone drip.       apixaban, 5 mg, Oral, Q12H  atorvastatin, 10 mg, Oral, Daily  azaTHIOprine, 50 mg, Oral, BID  DULoxetine, 40 mg, Oral, Daily  levothyroxine, 100 mcg, Oral, Once per day on Sun Sat  levothyroxine, 50 mcg, Oral, Daily  metoprolol succinate XL, 50 mg, Oral, Daily  potassium chloride, 20 mEq, Oral, Daily  [START ON 8/17/2022] predniSONE, 5 mg, Oral, Daily With Breakfast  sodium chloride, 10 mL, Intravenous, Q12H  tacrolimus, 1 mg, Oral, BID       Pharmacy Consult - Remdesivir,          Active Hospital Problems:  Active Hospital Problems    Diagnosis    • **Dyspnea on exertion    • Cytokine release syndrome, grade 1    • Elevated troponin    • COVID-19 virus detected    • COPD (chronic obstructive pulmonary disease) (HCC)    •  NSTEMI (non-ST elevated myocardial infarction) ([facility])      Added automatically from request for surgery [id number]       NSTEMI:  -Cardiology consulted  -s/p C 8/12/2022--> nonobstructive CAD    COVID-19 pneumonia:  -Vaccinated patient  -Continue remdesivir, prednisone and bronchodilators    New atrial fibrillation:  -Started Eliquis and amiodarone drip 8/13/202  -Amiodarone changed to oral 8/14/2022    History of renal transplant:  -Continue Imuran and Prograf prednisone  -Nephrology following    Chronic hypoxemic respiratory failure:  -On 3L at baseline  -Titrate pulse ox> 92%    Chronic back pain:  -Norco PRN    History of neuroendocrine carcinoma of lung (2016)  -s/p right lower lobe resection        DVT prophylaxis:  Medical DVT prophylaxis orders are present.    CODE STATUS:    Code Status (Patient has no pulse and is not breathing): CPR (Attempt to Resuscitate)  Medical Interventions (Patient has pulse or is breathing): Full Support      Disposition:  I expect patient to be discharged home.    This patient has been examined wearing appropriate Personal Protective Equipment and discussed with nursing. 08/16/22      Electronically signed by Clyde White DO, 08/16/22, 15:03 EDT.  [facility] [place] Hospitalist Team

## 2022-08-16 NOTE — PLAN OF CARE
Problem: Adult Inpatient Plan of Care  Goal: Absence of Hospital-Acquired Illness or Injury  Intervention: Identify and Manage Fall Risk  Recent Flowsheet Documentation  Taken 8/16/2022 1600 by Vivi Enamorado RN  Safety Promotion/Fall Prevention:   activity supervised   room organization consistent   safety round/check completed  Intervention: Prevent Skin Injury  Recent Flowsheet Documentation  Taken 8/16/2022 1600 by Vivi Enamorado RN  Body Position: position changed independently  Skin Protection: adhesive use limited  Intervention: Prevent Infection  Recent Flowsheet Documentation  Taken 8/16/2022 1600 by Vivi Enamorado RN  Infection Prevention:   cohorting utilized   single patient room provided  Goal: Optimal Comfort and Wellbeing  Intervention: Provide Person-Centered Care  Recent Flowsheet Documentation  Taken 8/16/2022 1600 by Vivi Enamorado RN  Trust Relationship/Rapport:   care explained   questions answered   questions encouraged   thoughts/feelings acknowledged     Problem: Adjustment to Illness COPD (Chronic Obstructive Pulmonary Disease)  Goal: Optimal Chronic Illness Coping  Intervention: Support and Optimize Psychosocial Response  Recent Flowsheet Documentation  Taken 8/16/2022 1600 by Vivi Enamorado RN  Family/Support System Care: support provided     Problem: Infection COPD (Chronic Obstructive Pulmonary Disease)  Goal: Absence of Infection Signs and Symptoms  Intervention: Prevent or Manage Infection  Recent Flowsheet Documentation  Taken 8/16/2022 1600 by Vivi Enamorado RN  Isolation Precautions:   precautions maintained   enhanced contact     Problem: Fall Injury Risk  Goal: Absence of Fall and Fall-Related Injury  Intervention: Identify and Manage Contributors  Recent Flowsheet Documentation  Taken 8/16/2022 1600 by Vivi Enamorado RN  Medication Review/Management: medications reviewed  Intervention: Promote Injury-Free Environment  Recent Flowsheet Documentation  Taken 8/16/2022 1600 by Fei  GHASSAN Trinidad  Safety Promotion/Fall Prevention:   activity supervised   room organization consistent   safety round/check completed     Problem: COPD (Chronic Obstructive Pulmonary Disease) Comorbidity  Goal: Maintenance of COPD Symptom Control  Intervention: Maintain COPD-Symptom Control  Recent Flowsheet Documentation  Taken 8/16/2022 1600 by Vivi Enamorado RN  Medication Review/Management: medications reviewed     Problem: Hypertension Comorbidity  Goal: Blood Pressure in Desired Range  Intervention: Maintain Blood Pressure Management  Recent Flowsheet Documentation  Taken 8/16/2022 1600 by Vivi Enamorado RN  Medication Review/Management: medications reviewed     Problem: Skin Injury Risk Increased  Goal: Skin Health and Integrity  Intervention: Optimize Skin Protection  Recent Flowsheet Documentation  Taken 8/16/2022 1600 by Vivi Enamorado RN  Pressure Reduction Techniques: frequent weight shift encouraged  Pressure Reduction Devices: positioning supports utilized  Skin Protection: adhesive use limited     Problem: Fall Injury Risk  Goal: Absence of Fall and Fall-Related Injury  Intervention: Identify and Manage Contributors  Recent Flowsheet Documentation  Taken 8/16/2022 1600 by Vivi Enamorado RN  Medication Review/Management: medications reviewed  Intervention: Promote Injury-Free Environment  Recent Flowsheet Documentation  Taken 8/16/2022 1600 by Vivi Enamorado RN  Safety Promotion/Fall Prevention:   activity supervised   room organization consistent   safety round/check completed   Goal Outcome Evaluation:            Pt is waiting room son to pick her up. She has been d/c'ed

## 2022-08-16 NOTE — THERAPY TREATMENT NOTE
"Subjective: Pt agreeable to therapeutic plan of care. Pt expressed she does not want to go to rehab due to previous bad experiences at different locations. This PTA educated on importance of safety when discharged from hospital.     Objective:     Bed mobility - CGA  Transfers - CGA  Ambulation - 20 feet CGA-HHA  Ther ex- Seated: AP, LAQ, Marches x10    Vitals: WNL O2 sats >90% during ambulation on 2L O2 with pt c/o feeling SOB    Pain: 4 VAS  Education: Provided education on importance of mobility and skilled verbal / tactile cueing throughout intervention.     Assessment: Jaja Carcamo presents with functional mobility impairments which indicate the need for skilled intervention. Pt continues to display decreased activity tolerance due to feeling SOB during activity. Pt required HHA-CGA during ambulation without any LOB but displayed unsteadiness. Pt Tolerating session today without incident. Will continue to follow and progress as tolerated.     Plan/Recommendations:   Moderate Intensity Therapy recommended post-acute care. This is recommended as therapy feels the patient would require 3-4 days per week and wouldn't tolerate \"3 hour daily\" rehab intensity. SNF would be the preferred choice. If the patient does not agree to SNF, arrange HH or OP depending on home bound status. If patient is medically complex, consider LTACH.. Pt requires no DME at discharge.     Pt desires Home and Home with family assist at discharge. Pt cooperative; agreeable to therapeutic recommendations and plan of care.         Basic Mobility 6-click:  Rollin = Total, A lot = 2, A little = 3; 4 = None  Supine>Sit:   1 = Total, A lot = 2, A little = 3; 4 = None   Sit>Stand with arms:  1 = Total, A lot = 2, A little = 3; 4 = None  Bed>Chair:   1 = Total, A lot = 2, A little = 3; 4 = None  Ambulate in room:  1 = Total, A lot = 2, A little = 3; 4 = None  3-5 Steps with railin = Total, A lot = 2, A little = 3; 4 = None  Score: " 17    Modified [place]: N/A = No pre-op stroke/TIA    Post-Tx Position: Up in Chair, Alarms activated and Call light and personal items within reach  PPE: gloves, surgical mask, eyewear protection

## 2022-08-16 NOTE — PLAN OF CARE
Goal Outcome Evaluation:          Jaja Carcamo presents with functional mobility impairments which indicate the need for skilled intervention. Pt continues to display decreased activity tolerance due to feeling SOB during activity. Pt required HHA-CGA during ambulation without any LOB but displayed unsteadiness. Pt Tolerating session today without incident. Will continue to follow and progress as tolerated.

## 2022-08-16 NOTE — CASE MANAGEMENT/SOCIAL WORK
Continued Stay Note  [facility]     Patient Name: Jaja Carcamo  MRN: [id number]  Today's Date: 8/16/2022    Admit Date: 8/11/2022     Discharge Plan   Row Name 08/16/22 1509       Plan    Plan Declined rehab. Home with [facility], accepted, order placed.    Plan Comments Per varun Montiel patient accepted to [facility], order placed.    Final Discharge Disposition Code 06 - home with home health care    Final Note [facility]        Row Name 08/16/22 1114       Plan    Plan Declined rehab. Home with [facility], pending acceptance, order needed. Home O2 [place] (3L).    Plan Comments CM was able to talk to patient via telephone regarding discharge plan. Patient stated the only rehab she would like to go to is [facility], CM informed patient she does not qualify for [facility] and is COVID positive. CM explained the only [place] rehabs that [place] patients are [place] and [facility], patient stated she does not want to go to either. Patient agreed to , would like [facility]. Referral placed, message sent to varun Montiel, pending acceptance, pending order. CM updated MD and floor RN. Patient will have family transport at discharge.            Phone communication or documentation only - no physical contact with patient or family.      Lindy Simmons RN      ==================================================================================    Case Management Discharge Note      Final Note: [facility] Medical Care Coordination complete.    Service Provider Selected Services Address Phone Fax Patient Preferred    [facility]  Home Nursing ,  Home Rehabilitation [address] [facility] [place] [address] [phone] --              Transportation Services  Private: Car    Final Discharge Disposition Code: 06 - home with home health care

## 2022-08-16 NOTE — PROGRESS NOTES
"                                                                                                                                      Nephrology  Progress Note                                        [facility]    Patient Identification    Name: Jaja Carcamo  Age: 77 y.o.  Sex: female  :  1945  MRN: [id number]      DATE OF SERVICE:  2022        Subective    No new complaints   soa     Objective   Scheduled Meds:amiodarone, 200 mg, Oral, Q24H  apixaban, 5 mg, Oral, Q12H  atorvastatin, 10 mg, Oral, Daily  azaTHIOprine, 50 mg, Oral, BID  DULoxetine, 40 mg, Oral, Daily  levothyroxine, 100 mcg, Oral, Once per day on Sun Sat  levothyroxine, 50 mcg, Oral, Daily  metoprolol succinate XL, 50 mg, Oral, Daily  potassium chloride, 20 mEq, Oral, Daily  predniSONE, 10 mg, Oral, Daily  [START ON 2022] predniSONE, 5 mg, Oral, Daily With Breakfast  sodium chloride, 10 mL, Intravenous, Q12H  tacrolimus, 1 mg, Oral, BID          Continuous Infusions:Pharmacy Consult - Remdesivir,         PRN Meds:•  acetaminophen **OR** acetaminophen **OR** acetaminophen  •  albuterol sulfate HFA  •  diazePAM  •  diphenhydrAMINE  •  HYDROcodone-acetaminophen  •  loperamide  •  ondansetron **OR** ondansetron  •  Pharmacy Consult - Remdesivir  •  [COMPLETED] Insert peripheral IV **AND** sodium chloride  •  sodium chloride     Exam:  /62 (BP Location: Left arm, Patient Position: Lying)   Pulse 59   Temp 98.2 °F (36.8 °C) (Oral)   Resp 16   Ht 170.2 cm (67\")   Wt 84.7 kg (186 lb 11.7 oz)   SpO2 97%   BMI 29.25 kg/m²     Intake/Output last 3 shifts:  I/O last 3 completed shifts:  In: 930 [P.O.:930]  Out: 1400 [Urine:1400]    Intake/Output this shift:  No intake/output data recorded.    Physical exam:  General Appearance:  Alert  Head:  Normocephalic, without obvious abnormality, atraumatic  Eyes:  PERRL, conjunctiva/corneas clear     Neck:  Supple,  no adenopathy;      Lungs:  Decreased BS occasion " olga  Heart:  Regular rate and rhythm, S1 and S2 normal  Abdomen:  Soft, non-tender, bowel sounds active   Extremities: trace edema  Pulses: 2+ and symmetric all extremities  Skin:  No rashes or lesions       Data Review:  All labs (24hrs):   Recent Results (from the past 24 hour(s))   ECG 12 Lead    Collection Time: 08/15/22 12:00 PM   Result Value Ref Range    QT Interval 473 ms   Creatinine, Urine, Random - Urine, Clean Catch    Collection Time: 08/15/22  2:03 PM    Specimen: Urine, Clean Catch   Result Value Ref Range    Creatinine, Urine 66.9 mg/dL   Protein, Urine, Random - Urine, Clean Catch    Collection Time: 08/15/22  2:03 PM    Specimen: Urine, Clean Catch   Result Value Ref Range    Total Protein, Urine 30.7 mg/dL   Basic Metabolic Panel    Collection Time: 08/16/22  4:20 AM    Specimen: Blood   Result Value Ref Range    Glucose 88 65 - 99 mg/dL    BUN 16 8 - 23 mg/dL    Creatinine 0.61 0.57 - 1.00 mg/dL    Sodium 140 136 - 145 mmol/L    Potassium 4.5 3.5 - 5.2 mmol/L    Chloride 105 98 - 107 mmol/L    CO2 26.0 22.0 - 29.0 mmol/L    Calcium 9.6 8.6 - 10.5 mg/dL    BUN/Creatinine Ratio 26.2 (H) 7.0 - 25.0    Anion Gap 9.0 5.0 - 15.0 mmol/L    eGFR 93.4 >60.0 mL/min/1.73   CBC Auto Differential    Collection Time: 08/16/22  4:20 AM    Specimen: Blood   Result Value Ref Range    WBC 2.40 (L) 3.40 - 10.80 10*3/mm3    RBC 3.56 (L) 3.77 - 5.28 10*6/mm3    Hemoglobin 11.2 (L) 12.0 - 15.9 g/dL    Hematocrit 33.5 (L) 34.0 - 46.6 %    MCV 94.3 79.0 - 97.0 fL    MCH 31.5 26.6 - 33.0 pg    MCHC 33.4 31.5 - 35.7 g/dL    RDW 14.6 12.3 - 15.4 %    RDW-SD 48.1 37.0 - 54.0 fl    MPV 8.1 6.0 - 12.0 fL    Platelets 101 (L) 140 - 450 10*3/mm3    Neutrophil % 54.5 42.7 - 76.0 %    Lymphocyte % 25.2 19.6 - 45.3 %    Monocyte % 19.5 (H) 5.0 - 12.0 %    Eosinophil % 0.2 (L) 0.3 - 6.2 %    Basophil % 0.6 0.0 - 1.5 %    Neutrophils, Absolute 1.30 (L) 1.70 - 7.00 10*3/mm3    Lymphocytes, Absolute 0.60 (L) 0.70 - 3.10 10*3/mm3     Monocytes, Absolute 0.50 0.10 - 0.90 10*3/mm3    Eosinophils, Absolute 0.00 0.00 - 0.40 10*3/mm3    Basophils, Absolute 0.00 0.00 - 0.20 10*3/mm3    nRBC 0.4 (H) 0.0 - 0.2 /100 WBC          Imaging:  XR Chest 1 View    Result Date: 8/15/2022   1. Stable cardiac enlargement. FINDINGS consistent with interstitial edema, small layering bilateral pleural effusions, as well as mild left greater than right basilar atelectasis. The findings are not thought to be significantly changed compared to 8/11/2022.  Electronically Signed By-Karie Bustos MD On:8/15/2022 10:54 AM This report was finalized on [id number] by  Karie Bustos MD.    XR Chest 1 View    Result Date: 8/11/2022  Moderate cardiomegaly with features of interstitial edema and small bilateral pleural effusions. Probable mild left basilar atelectasis.  Electronically Signed By-Karie Bustos MD On:8/11/2022 4:28 PM This report was finalized on [id number] by  Karie Bustos MD.    CT Angiogram Chest Pulmonary Embolism    Result Date: 8/12/2022  1. No pulmonary embolus. 2. Small pleural effusions. 3. Severe centrilobular emphysema but no airspace consolidation. 4. Right lobectomy. 5. Severe renal atrophy Electronically signed by:  Zach Zendejas M.D.  8/11/2022 11:29 PM      Assessment/Plan:     Dyspnea on exertion    Elevated troponin    COVID-19 virus detected    COPD (chronic obstructive pulmonary disease) ([facility])    NSTEMI (non-ST elevated myocardial infarction) ([facility])       Kidney transplant with no signs of infection rejection or side effects              -Imuran and prednisone and Prograf  Shortness of breath  COVID-19 infection  Elevated troponin  COPD    Creatinine stable  Ok for diuresis   Continue immunosuppression

## 2022-08-16 NOTE — PROGRESS NOTES
Referring Provider: Clyde White,*    Reason for follow-up: Shortness of breath, non-STEMI     Patient Care Team:  Gary Bunch MD as PCP - General (Internal Medicine)    Subjective .   Patient continues to report shortness of breath requiring supplemental oxygen especially during physical therapy or any movements      History  Past Medical History:   Diagnosis Date   • Cancer (HCC)     lung   • COPD (chronic obstructive pulmonary disease) (HCC)    • History of appendectomy    • Hypertension    • Renal disease    • Hartman syndrome        Past Surgical History:   Procedure Laterality Date   • BREAST SURGERY Left     cysts rmeoved   • CARDIAC CATHETERIZATION Right 8/12/2022    Procedure: Left Heart Cath and coronary angiogram;  Surgeon: Jak Gavin MD;  Location: [facility] CATH INVASIVE LOCATION;  Service: Cardiology;  Laterality: Right;   • CLOSED REDUCTION WRIST FRACTURE Right 3/1/2022    Procedure: WRIST CLOSED REDUCTION;  Surgeon: Gaudencio Townsend MD;  Location: [facility] MAIN OR;  Service: Orthopedics;  Laterality: Right;   • CYSTOSCOPY     • HYSTERECTOMY     • LUNG LOBECTOMY Right    • TRANSPLANTATION RENAL         Family History   Problem Relation Age of Onset   • No Known Problems Mother    • No Known Problems Father        Social History     Tobacco Use   • Smoking status: Former Smoker   • Smokeless tobacco: Never Used   Vaping Use   • Vaping Use: Former   Substance Use Topics   • Alcohol use: Never   • Drug use: Never        Medications Prior to Admission   Medication Sig Dispense Refill Last Dose   • predniSONE (DELTASONE) 5 MG tablet Take 5 mg by mouth Daily.   8/11/2022 at Unknown time   • albuterol sulfate  (90 Base) MCG/ACT inhaler Inhale 2 puffs Every 6 (Six) Hours As Needed for Wheezing.      • azaTHIOprine (IMURAN) 50 MG tablet Take 50 mg by mouth 2 (Two) Times a Day.      • diazePAM (VALIUM) 5 MG tablet Take 5 mg by mouth 2 (Two) Times a Day As Needed for Anxiety.      • DULoxetine  (CYMBALTA) 20 MG capsule Take 40 mg by mouth Daily.      • furosemide (LASIX) 40 MG tablet Take 40 mg by mouth 2 (Two) Times a Day.      • HYDROcodone-acetaminophen (NORCO) 7.5-325 MG per tablet Take 1 tablet by mouth 4 (Four) Times a Day As Needed for Severe Pain .      • levothyroxine (SYNTHROID, LEVOTHROID) 50 MCG tablet Take 50 mcg by mouth See Admin Instructions. Monday through Friday   Unknown at Unknown time   • levothyroxine (SYNTHROID, LEVOTHROID) 50 MCG tablet Take 100 mcg by mouth 2 (Two) Times a Week. Saturday and Sunday   Unknown at Unknown time   • metoprolol succinate XL (TOPROL-XL) 50 MG 24 hr tablet Take 50 mg by mouth Daily.      • potassium chloride (K-DUR,KLOR-CON) 20 MEQ CR tablet Take 20 mEq by mouth Daily.      • simvastatin (ZOCOR) 20 MG tablet Take 20 mg by mouth Every Night.      • tacrolimus (PROGRAF) 1 MG capsule Take 1 mg by mouth 2 (Two) Times a Day.          Allergies  Zolpidem    Scheduled Meds:apixaban, 5 mg, Oral, Q12H  atorvastatin, 10 mg, Oral, Daily  azaTHIOprine, 50 mg, Oral, BID  DULoxetine, 40 mg, Oral, Daily  levothyroxine, 100 mcg, Oral, Once per day on Sun Sat  levothyroxine, 50 mcg, Oral, Daily  metoprolol succinate XL, 50 mg, Oral, Daily  potassium chloride, 20 mEq, Oral, Daily  [START ON 8/17/2022] predniSONE, 5 mg, Oral, Daily With Breakfast  sodium chloride, 10 mL, Intravenous, Q12H  tacrolimus, 1 mg, Oral, BID      Continuous Infusions:Pharmacy Consult - Remdesivir,       PRN Meds:.•  acetaminophen **OR** acetaminophen **OR** acetaminophen  •  albuterol sulfate HFA  •  diazePAM  •  diphenhydrAMINE  •  HYDROcodone-acetaminophen  •  loperamide  •  ondansetron **OR** ondansetron  •  Pharmacy Consult - Remdesivir  •  [COMPLETED] Insert peripheral IV **AND** sodium chloride  •  sodium chloride    Objective     VITAL SIGNS  Vitals:    08/16/22 0510 08/16/22 0535 08/16/22 1002 08/16/22 1053   BP: 152/62   133/48   BP Location: Left arm      Patient Position: Lying     "  Pulse: 59  76 67   Resp: 16   20   Temp: 98.2 °F (36.8 °C)   97.7 °F (36.5 °C)   TempSrc: Oral   Oral   SpO2: 97%   95%   Weight:  84.7 kg (186 lb 11.7 oz)     Height:           Flowsheet Rows    Flowsheet Row First Filed Value   Admission Height 167.6 cm (66\") Documented at 08/11/2022 1301   Admission Weight 81.6 kg (180 lb) Documented at 08/11/2022 1301            Intake/Output Summary (Last 24 hours) at 8/16/2022 1126  Last data filed at 8/16/2022 0535  Gross per 24 hour   Intake 930 ml   Output 500 ml   Net 430 ml            Physical Exam:  The patient is alert, oriented and in no distress.  Vital signs as noted above.  Head and neck revealed no carotid bruits or jugular venous distention.  No thyromegaly or lymphadenopathy is present  Lungs clear.  No wheezing.  Breath sounds are normal bilaterally.  Heart normal first and second heart sounds.  No murmur. No precordial rub is present.  No gallop is present.  Abdomen soft and nontender.  No organomegaly is present.  Extremities with good peripheral pulses without any pedal edema.  Skin warm and dry.  Musculoskeletal system is grossly normal  CNS grossly normal      Results Review:   I reviewed the patient's new clinical results.  Lab Results (last 24 hours)     Procedure Component Value Units Date/Time    Basic Metabolic Panel [[id number]]  (Abnormal) Collected: 08/16/22 0420    Specimen: Blood Updated: 08/16/22 0538     Glucose 88 mg/dL      BUN 16 mg/dL      Creatinine 0.61 mg/dL      Sodium 140 mmol/L      Potassium 4.5 mmol/L      Chloride 105 mmol/L      CO2 26.0 mmol/L      Calcium 9.6 mg/dL      BUN/Creatinine Ratio 26.2     Anion Gap 9.0 mmol/L      eGFR 93.4 mL/min/1.73      Comment: National Kidney Foundation and American Society of Nephrology (ASN) Task Force recommended calculation based on the Chronic Kidney Disease Epidemiology Collaboration (CKD-EPI) equation refit without adjustment for race.       Narrative:      GFR Normal >60  Chronic Kidney " Disease <60  Kidney Failure <15      CBC & Differential [[id number]]  (Abnormal) Collected: 08/16/22 0420    Specimen: Blood Updated: 08/16/22 0515    Narrative:      The following orders were created for panel order CBC & Differential.  Procedure                               Abnormality         Status                     ---------                               -----------         ------                     CBC Auto Differential[[id number]]        Abnormal            Final result                 Please view results for these tests on the individual orders.    CBC Auto Differential [[id number]]  (Abnormal) Collected: 08/16/22 0420    Specimen: Blood Updated: 08/16/22 0515     WBC 2.40 10*3/mm3      RBC 3.56 10*6/mm3      Hemoglobin 11.2 g/dL      Hematocrit 33.5 %      MCV 94.3 fL      MCH 31.5 pg      MCHC 33.4 g/dL      RDW 14.6 %      RDW-SD 48.1 fl      MPV 8.1 fL      Platelets 101 10*3/mm3      Neutrophil % 54.5 %      Lymphocyte % 25.2 %      Monocyte % 19.5 %      Eosinophil % 0.2 %      Basophil % 0.6 %      Neutrophils, Absolute 1.30 10*3/mm3      Lymphocytes, Absolute 0.60 10*3/mm3      Monocytes, Absolute 0.50 10*3/mm3      Eosinophils, Absolute 0.00 10*3/mm3      Basophils, Absolute 0.00 10*3/mm3      nRBC 0.4 /100 WBC     Tacrolimus Level [[id number]] Collected: 08/16/22 0420    Specimen: Blood Updated: 08/16/22 0506    Creatinine, Urine, Random - Urine, Clean Catch [[id number]] Collected: 08/15/22 1403    Specimen: Urine, Clean Catch Updated: 08/15/22 1946     Creatinine, Urine 66.9 mg/dL     Narrative:      Reference intervals for random urine have not been established.  Clinical usage is dependent upon physician's interpretation in combination with other laboratory tests.       Protein, Urine, Random - Urine, Clean Catch [[id number]] Collected: 08/15/22 1403    Specimen: Urine, Clean Catch Updated: 08/15/22 1444     Total Protein, Urine 30.7 mg/dL     Narrative:      Reference intervals for random urine  have not been established.  Clinical usage is dependent upon physician's interpretation in combination with other laboratory tests.       TSH [[id number]]  (Normal) Collected: 08/15/22 0322    Specimen: Blood Updated: 08/15/22 1325     TSH 1.940 uIU/mL           Imaging Results (Last 24 Hours)     ** No results found for the last 24 hours. **      LAB RESULTS (LAST 7 DAYS)    CBC  Results from last 7 days   Lab Units 08/16/22  0420 08/15/22  0322 08/14/22  0407 08/13/22  0353 08/11/22  1614   WBC 10*3/mm3 2.40* 2.80* 2.50* 2.20* 3.40   RBC 10*6/mm3 3.56* 3.53* 3.46* 3.47* 3.62*   HEMOGLOBIN g/dL 11.2* 11.2* 11.1* 11.0* 11.5*   HEMATOCRIT % 33.5* 33.4* 33.9* 33.2* 34.9   MCV fL 94.3 94.6 97.8* 95.5 96.3   PLATELETS 10*3/mm3 101* 97* 80* 85* 94*       BMP  Results from last 7 days   Lab Units 08/16/22  0420 08/15/22  0322 08/14/22  0407 08/13/22  0646 08/13/22  0353 08/12/22  0933 08/11/22  2305 08/11/22  1614   SODIUM mmol/L 140 138 139 141  --   --   --  140   POTASSIUM mmol/L 4.5 4.4 4.0 3.8  --   --   --  4.4   CHLORIDE mmol/L 105 104 105 106  --   --   --  104   CO2 mmol/L 26.0 26.0 24.0 28.0  --   --   --  28.0   BUN mg/dL 16 15 17 13  --   --   --  16   CREATININE mg/dL 0.61 0.59 0.64 0.64  --  0.69 0.72 0.70   GLUCOSE mg/dL 88 96 95 92  --   --   --  103*   MAGNESIUM mg/dL  --   --  1.7  --  1.7  --   --   --    PHOSPHORUS mg/dL  --   --  2.8  --  2.7  --   --   --        CMP   Results from last 7 days   Lab Units 08/16/22  0420 08/15/22  0322 08/14/22  0407 08/13/22  0646 08/12/22  0933 08/11/22  2302 08/11/22  1614   SODIUM mmol/L 140 138 139 141  --   --  140   POTASSIUM mmol/L 4.5 4.4 4.0 3.8  --   --  4.4   CHLORIDE mmol/L 105 104 105 106  --   --  104   CO2 mmol/L 26.0 26.0 24.0 28.0  --   --  28.0   BUN mg/dL 16 15 17 13  --   --  16   CREATININE mg/dL 0.61 0.59 0.64 0.64 0.69 0.72 0.70   GLUCOSE mg/dL 88 96 95 92  --   --  103*   ALBUMIN g/dL  --  3.40* 3.20* 3.10* 3.80 3.50 3.90   BILIRUBIN mg/dL  --   0.7 0.7 0.7 1.2 1.2 1.4*   ALK PHOS U/L  --  46 44 44 53 47 55   AST (SGOT) U/L  --  23 32 45* 74* 69* 76*   ALT (SGPT) U/L  --  18 22 27 40* 33 34*         BNP        TROPONIN  Results from last 7 days   Lab Units 08/11/22  2305   TROPONIN T ng/mL 0.424*       CoAg  Results from last 7 days   Lab Units 08/12/22  1205 08/12/22  0933 08/11/22  2305 08/11/22  1759   INR   --   --   --  1.09   APTT seconds 50.5* 112.1* 59.5* 28.5*       Creatinine Clearance  Estimated Creatinine Clearance: 89.1 mL/min (by C-G formula based on SCr of 0.61 mg/dL).    ABG        Radiology  XR Chest 1 View    Result Date: 8/15/2022   1. Stable cardiac enlargement. FINDINGS consistent with interstitial edema, small layering bilateral pleural effusions, as well as mild left greater than right basilar atelectasis. The findings are not thought to be significantly changed compared to 8/11/2022.  Electronically Signed By-Karie Bustos MD On:8/15/2022 10:54 AM This report was finalized on [id number] by  Karie Bustos MD.          EKG      I personally viewed and interpreted the patient's EKG/Telemetry data:    ECHOCARDIOGRAM:    Results for orders placed during the hospital encounter of 08/11/22    Adult Transthoracic Echo Limited W/ Cont if Necessary Per Protocol    Interpretation Summary  · Calculated left ventricular EF = 55% Estimated left ventricular EF was in agreement with the calculated left ventricular EF.  · Left ventricular diastolic function was not assessed.  · The right atrial cavity is borderline dilated.  · Moderate mitral valve regurgitation is present.          STRESS MYOVIEW:    Cardiolite (Tc-99m Sestamibi) stress test    CARDIAC CATHETERIZATION:            OTHER:         Assessment & Plan     Principal Problem:    Dyspnea on exertion  Active Problems:    NSTEMI (non-ST elevated myocardial infarction) (HCC)    Elevated troponin    COVID-19 virus detected    COPD (chronic obstructive pulmonary disease)  (HCC)      77-year-old frail appearing woman with multiple comorbidities presented with COVID-19 and respiratory failure.  She had elevated troponin however her cardiac catheterization showed nonobstructive coronary disease.  Her LVEDP was elevated at 21.  She has been started on diuretics  Echocardiogram also showed moderate MR but preserved LV function.  She went into atrial fibrillation for which she was started on amiodarone  She has been bradycardic, we will discontinue amiodarone and continue beta-blocker.  From cardiac standpoint she can remain on beta-blocker, full dose anticoagulation and oral diuretics 40 mg p.o. twice daily of Lasix.  Continue Synthroid, TSH is 1.9.  She will need extensive rehab  Respiratory failure and supplemental oxygen requirement is likely secondary to COVID-19 and not cardiac in nature.  Inpatient cardiology team will be available as needed.        Jak Gavin MD  08/16/22  11:26 EDT

## 2022-08-17 ENCOUNTER — READMISSION MANAGEMENT (OUTPATIENT)
Dept: CALL CENTER | Facility: HOSPITAL | Age: 77
End: 2022-08-17

## 2022-08-17 LAB — TACROLIMUS BLD LC/MS/MS-MCNC: 7.1 NG/ML (ref 2–20)

## 2022-08-17 NOTE — OUTREACH NOTE
COVID-19 Week 1 Survey    Flowsheet Row Responses   [facility] facility patient discharged from? [facility]   Does the patient have one of the following disease processes/diagnoses(primary or secondary)? COVID-19   COVID-19 underlying condition? None   Call Number Call 1   Week 1 Call successful? No   Discharge diagnosis Chest pain,    shortness of breath,    underwent LHC,    COVID19 detected          AYESHA KOENIG - Registered Nurse

## 2022-08-17 NOTE — OUTREACH NOTE
Prep Survey    Flowsheet Row Responses   [facility] facility patient discharged from? [facility]   Is LACE score < 7 ? No   Emergency Room discharge w/ pulse ox? No   Eligibility Readm Mgmt   Discharge diagnosis Chest pain,    shortness of breath,    underwent LHC,    COVID19 detected   Does the patient have one of the following disease processes/diagnoses(primary or secondary)? COVID-19   Does the patient have Home health ordered? Yes   What is the Home health agency?  [facility]   Is there a DME ordered? Yes   What DME was ordered? Home O2 - [place]    Prep survey completed? Yes          RUI SANFORD - Registered Nurse

## 2022-08-18 ENCOUNTER — HOME CARE VISIT (OUTPATIENT)
Dept: HOME HEALTH SERVICES | Facility: HOME HEALTHCARE | Age: 77
End: 2022-08-18

## 2022-08-18 VITALS
DIASTOLIC BLOOD PRESSURE: 60 MMHG | OXYGEN SATURATION: 94 % | RESPIRATION RATE: 17 BRPM | HEART RATE: 52 BPM | TEMPERATURE: 97.5 F | SYSTOLIC BLOOD PRESSURE: 118 MMHG

## 2022-08-18 PROCEDURE — G0299 HHS/HOSPICE OF RN EA 15 MIN: HCPCS

## 2022-08-18 NOTE — HOME HEALTH
Pt originally went to [facility] ER due to increase in SOA and chest pain. Pt was found to be in Afib and also Covid-19 positive on 8.11.22. Pt had started feeling bad several days prior and had been seen in ER in [place] which found small pleural effusions and severe central global emphysema, right lobectomy and severe renal atrophy. Pt was DC back home at that time. Heart cath was performed on 8.12 which showed showed nonobstructive CAD. No stents placed at this time. Medications adjusted and pt was DC back home per her request on 8.16.22. Pt reports she has had diarrhea for several months with no improvement. Pt reports taking immodium for this. Pt has been on home o2 for several years and is a renal transplant pt. Pt lives alone but her son lives close by and checks in on her regularly. Pt is agreeable with PT and [facility] services at this time.  Primary focus of care: Afib  Past medical history of renal transplant, chronic hypoxemic respiratory failure on home oxygen, anxiety on diazepam, hypothyroidism, chronic pain on hydrocodone and hyperlipidemia.     CP assess  afib monitoring with education  assess GI status  medication review  assess weakness and breathing

## 2022-08-19 ENCOUNTER — READMISSION MANAGEMENT (OUTPATIENT)
Dept: CALL CENTER | Facility: HOSPITAL | Age: 77
End: 2022-08-19

## 2022-08-19 NOTE — OUTREACH NOTE
COVID-19 Week 1 Survey    Flowsheet Row Responses   [facility] facility patient discharged from? [facility]   Does the patient have one of the following disease processes/diagnoses(primary or secondary)? COVID-19   COVID-19 underlying condition? None   Call Number Call 1   Week 1 Call successful? No   Revoke Decline to participate  [hung up]   Discharge diagnosis Chest pain,    shortness of breath,    underwent LHC,    COVID19 detected          WALESKA LOWERY - Registered Nurse

## 2022-08-20 ENCOUNTER — HOME CARE VISIT (OUTPATIENT)
Dept: HOME HEALTH SERVICES | Facility: HOME HEALTHCARE | Age: 77
End: 2022-08-20

## 2022-08-20 PROBLEM — R79.89 ELEVATED TROPONIN: Status: RESOLVED | Noted: 2022-08-11 | Resolved: 2022-08-20

## 2022-08-20 PROBLEM — R77.8 ELEVATED TROPONIN: Status: RESOLVED | Noted: 2022-08-11 | Resolved: 2022-08-20

## 2022-08-20 PROBLEM — R06.09 DYSPNEA ON EXERTION: Status: RESOLVED | Noted: 2022-08-11 | Resolved: 2022-08-20

## 2022-08-20 NOTE — DISCHARGE SUMMARY
[facility] Medicine Services  DISCHARGE SUMMARY    Patient Name: Jaja Carcamo  : 1945  MRN: [id number]    Date of Admission: 2022  Date of Discharge:  2022  Primary Care Physician: Gary Bunch MD      Presenting Problem:   Dyspnea on exertion [R06.00]  NSTEMI (non-ST elevated myocardial infarction) ([facility]) [I21.4]  Pleural effusion, bilateral [J90]    Active and Resolved Hospital Problems:  Active Hospital Problems    Diagnosis POA   • Cytokine release syndrome, grade 1 [D89.831] No     Priority: High   • COVID-19 virus detected [U07.1] Yes     Priority: High   • COPD (chronic obstructive pulmonary disease) ([facility]) [J44.9] Yes     Priority: Medium   • NSTEMI (non-ST elevated myocardial infarction) ([facility]) [I21.4] Unknown     Priority: Medium      Resolved Hospital Problems    Diagnosis POA   • **Dyspnea on exertion [R06.00] Yes     Priority: High   • Elevated troponin [R77.8] Yes     Priority: High         Hospital Course     Hospital Course:      Patient is a 77-year-old vaccinated female with history of renal transplant, chronic hypoxemic respiratory failure on home oxygen, anxiety on diazepam, hypothyroidism, chronic pain on hydrocodone and hyperlipidemia.     The patient came to the ED on 2022 complaining of about 1 week history of shortness of air.  She had been evaluated at [facility] on 2022 for shortness of air   CTA of the chest in the ED ruled out pulmonary embolism.  It showed small pleural effusions and severe central global emphysema, right lobectomy and severe renal atrophy.     The patient was then admitted to PCU.  She was evaluated by cardiologist regarding elevated troponins.   Nephrologist followed the patient for history of renal transplant and to prepare patient for left heart catheterization.  The patient underwent LHC on 2022 and it showed nonobstructive CAD.  TTE showed LVEF 55% and moderate MR. The patient had new  onset atrial fibrillation on 8/13/2022 therefore was started on Eliquis and amiodarone drip and eventually was changed to oral Cardizem.  The patient did not want to to go to rehab.  Home health was ordered.  She was discharged home in the afternoon of 8/16/2022.  Her son has been involved in her care.         DISCHARGE Follow Up Recommendations for labs and diagnostics:       Reasons For Change In Medications and Indications for New Medications:      Day of Discharge     Vital Signs:       Physical Exam:    HENT:      Head: Normocephalic.      Mouth/Throat:      Mouth: Mucous membranes are moist.   Eyes:      General: No scleral icterus.     Extraocular Movements: Extraocular movements intact.      Pupils: Pupils are equal, round, and reactive to light.   Cardiovascular:      Rate and Rhythm: Normal rate and regular rhythm.   Pulmonary:      Effort: Pulmonary effort is normal.   Abdominal:      General: Bowel sounds are normal.   Musculoskeletal:         General: Normal range of motion.      Cervical back: Normal range of motion.   Skin:     General: Skin is warm.   Neurological:      Mental Status: She is alert. Mental status is at baseline.   Psychiatric:         Mood and Affect: Mood normal.        Pertinent  and/or Most Recent Results     LAB RESULTS:      Lab 08/16/22  0420 08/15/22  0322 08/14/22  0407   WBC 2.40* 2.80* 2.50*   HEMOGLOBIN 11.2* 11.2* 11.1*   HEMATOCRIT 33.5* 33.4* 33.9*   PLATELETS 101* 97* 80*   NEUTROS ABS 1.30* 1.60* 1.40*   LYMPHS ABS 0.60* 0.70 0.70   MONOS ABS 0.50 0.50 0.40   EOS ABS 0.00 0.00 0.00   MCV 94.3 94.6 97.8*         Lab 08/16/22  0420 08/15/22  0322 08/14/22  0407   SODIUM 140 138 139   POTASSIUM 4.5 4.4 4.0   CHLORIDE 105 104 105   CO2 26.0 26.0 24.0   ANION GAP 9.0 8.0 10.0   BUN 16 15 17   CREATININE 0.61 0.59 0.64   EGFR 93.4 94.1 92.3   GLUCOSE 88 96 95   CALCIUM 9.6 9.2 8.9   MAGNESIUM  --   --  1.7   PHOSPHORUS  --   --  2.8   TSH  --  1.940  --          Lab  08/15/22  0322 08/14/22  0407   TOTAL PROTEIN 5.2* 5.0*   ALBUMIN 3.40* 3.20*   ALT (SGPT) 18 22   AST (SGOT) 23 32   BILIRUBIN 0.7 0.7   INDIRECT BILIRUBIN 0.5 0.5   BILIRUBIN DIRECT 0.2 0.2   ALK PHOS 46 44                     Brief Urine Lab Results  (Last result in the past 365 days)      Color   Clarity   Blood   Leuk Est   Nitrite   Protein   CREAT   Urine HCG        08/15/22 1403             66.9             Microbiology Results (last 10 days)     Procedure Component Value - Date/Time    COVID-19,CEPHEID/JACK,COR/TWILA/PAD/DEVAN IN-HOUSE(OR EMERGENT/ADD-ON),NP SWAB IN TRANSPORT MEDIA 3-4 HR TAT, RT-PCR - Swab, Nasopharynx [[id number]]  (Abnormal) Collected: 08/11/22 1614    Lab Status: Final result Specimen: Swab from Nasopharynx Updated: 08/11/22 1653     COVID19 Detected    Narrative:      Fact sheet for providers: https://www.fda.gov/media/434652/download     Fact sheet for patients: https://www.fda.gov/media/159292/download  Fact sheet for providers: https://www.fda.gov/media/797675/download    Fact sheet for patients: https://www.fda.gov/media/271149/download    Test performed by PCR.          XR Chest 1 View    Result Date: 8/15/2022  Impression:  1. Stable cardiac enlargement. FINDINGS consistent with interstitial edema, small layering bilateral pleural effusions, as well as mild left greater than right basilar atelectasis. The findings are not thought to be significantly changed compared to 8/11/2022.  Electronically Signed By-Karie Bustos MD On:8/15/2022 10:54 AM This report was finalized on [id number] by  Karie Bustos MD.    XR Chest 1 View    Result Date: 8/11/2022  Impression: Moderate cardiomegaly with features of interstitial edema and small bilateral pleural effusions. Probable mild left basilar atelectasis.  Electronically Signed By-Karie Bustos MD On:8/11/2022 4:28 PM This report was finalized on [id number] by  Karie Bustos MD.    CT Angiogram Chest Pulmonary Embolism    Result  Date: 8/12/2022  Impression: 1. No pulmonary embolus. 2. Small pleural effusions. 3. Severe centrilobular emphysema but no airspace consolidation. 4. Right lobectomy. 5. Severe renal atrophy Electronically signed by:  Zach Zendejas M.D.  8/11/2022 11:29 PM      Results for orders placed during the hospital encounter of 10/14/20    Duplex venous lower extremity bilateral CAR    Interpretation Summary  · Normal bilateral lower extremity venous duplex scan.      Results for orders placed during the hospital encounter of 10/14/20    Duplex venous lower extremity bilateral CAR    Interpretation Summary  · Normal bilateral lower extremity venous duplex scan.      Results for orders placed during the hospital encounter of 08/11/22    Adult Transthoracic Echo Limited W/ Cont if Necessary Per Protocol    Interpretation Summary  · Calculated left ventricular EF = 55% Estimated left ventricular EF was in agreement with the calculated left ventricular EF.  · Left ventricular diastolic function was not assessed.  · The right atrial cavity is borderline dilated.  · Moderate mitral valve regurgitation is present.      Labs Pending at Discharge:      Procedures Performed  Procedure(s):  Left Heart Cath and coronary angiogram         Consults:   Consults     Date and Time Order Name Status Description    8/11/2022  8:35 PM Inpatient Nephrology Consult Completed     8/11/2022  5:32 PM Cardiology (on-call MD unless specified) Completed             Discharge Details        Discharge Medications      New Medications      Instructions Start Date   Eliquis 5 MG tablet tablet  Generic drug: apixaban   Take 1 tablet by mouth Every 12 (Twelve) Hours.         Continue These Medications      Instructions Start Date   albuterol sulfate  (90 Base) MCG/ACT inhaler  Commonly known as: PROVENTIL HFA;VENTOLIN HFA;PROAIR HFA   2 puffs, Inhalation, Every 6 Hours PRN      azaTHIOprine 50 MG tablet  Commonly known as: IMURAN   50 mg, Oral,  2 Times Daily      diazePAM 5 MG tablet  Commonly known as: VALIUM   5 mg, Oral, 2 Times Daily PRN      DULoxetine 20 MG capsule  Commonly known as: CYMBALTA   40 mg, Oral, Daily      furosemide 40 MG tablet  Commonly known as: LASIX   40 mg, Oral, 2 Times Daily      HYDROcodone-acetaminophen 7.5-325 MG per tablet  Commonly known as: NORCO   1 tablet, Oral, 4 Times Daily PRN      levothyroxine 50 MCG tablet  Commonly known as: SYNTHROID, LEVOTHROID   50 mcg, Oral, See Admin Instructions, Monday through Friday      levothyroxine 50 MCG tablet  Commonly known as: SYNTHROID, LEVOTHROID   100 mcg, Oral, 2 Times Weekly, Saturday and Sunday      metoprolol succinate XL 50 MG 24 hr tablet  Commonly known as: TOPROL-XL   50 mg, Oral, Daily      potassium chloride 20 MEQ CR tablet  Commonly known as: K-DUR,KLOR-CON   20 mEq, Oral, 2 Times Daily      predniSONE 5 MG tablet  Commonly known as: DELTASONE   5 mg, Oral, Daily      simvastatin 20 MG tablet  Commonly known as: ZOCOR   20 mg, Oral, Nightly      tacrolimus 1 MG capsule  Commonly known as: PROGRAF   1 mg, Oral, 2 Times Daily             Allergies   Allergen Reactions   • Zolpidem Other (See Comments), Unknown (See Comments) and Unknown - High Severity     KEPT HER AWAKE  KEPT HER AWAKE  KEPT HER AWAKE  KEPT HER AWAKE           Discharge Disposition:   Home or Self Care    Diet:  Hospital:No active diet order        Discharge Activity: As tolerated      Discharge Condition: Hemodynamically stable      CODE STATUS:  Code Status and Medical Interventions:   Ordered at: 08/11/22 1937     Code Status (Patient has no pulse and is not breathing):    CPR (Attempt to Resuscitate)     Medical Interventions (Patient has pulse or is breathing):    Full Support         Future Appointments   Date Time Provider Department Center   8/22/2022 To Be Determined Ernestine Pardo, RN [facility]   8/23/2022 To Be Determined Mikie Villeda PT [facility]   8/25/2022 11:45 AM Romaine  MD [facility]   8/25/2022 To Be Determined Ernestine Pardo RN [facility]   8/29/2022 To Be Determined Ernestine Pardo, [facility]   9/1/2022 To Be Determined Ernestine Pardo, RN  [facility]   9/8/2022 To Be Determined Ernestine Pardo RN [facility]   9/15/2022 To Be Determined Ernestine Pardo RN [facility]   9/22/2022 To Be Determined Ernestine Pardo RN  [facility]   9/29/2022 To Be Determined Ernestine Pardo RN [facility]   10/6/2022 To Be Determined Ernestine Pardo RN [facility]   10/13/2022 To Be Determined Kiah Avila RN [facility]       Additional Instructions for the Follow-ups that You Need to Schedule     Ambulatory Referral to Cardiac Rehab   As directed      Ambulatory Referral to Home Health ([facility])   As directed      Face to Face Visit Date: 8/16/2022    Follow-up provider for Plan of Care?: I will be treating the patient on an ongoing basis.  Please send me the Plan of Care for signature.    Follow-up provider: GARY BUNCH [[id number]]    Reason/Clinical Findings: deconditioning    Describe mobility limitations that make leaving home difficult: deconditioning    Nursing/Therapeutic Services Requested: Skilled Nursing Physical Therapy    Skilled nursing orders: Medication education    Frequency: 1 Week 1         Discharge Follow-up with PCP   As directed       Currently Documented PCP:    Gary Bunch MD    PCP Phone Number:    [phone]     Follow Up Details: 2 weeks               Time spent on Discharge including face to face service: 15 minutes    This patient has been examined wearing appropriate Personal Protective Equipment and discussed with nursing. 08/20/22      Signature: Electronically signed by Clyde White DO, 08/20/22, 4:34 PM EDT.

## 2022-08-20 NOTE — HOME HEALTH
IF YOU HAVE ANY NEW OR WORSENING SYMPTOMS. PLEASE CALL YOUR DOCTOR OR RETURN TO THE NEAREST EMERGENCY ROOM   Patient states she is doing well. Still getting short of breath but nothing worsening. Reports her cough is productive. States she had a small nose bleed but it wasn't a lot of blood and stopped quickly. She states her nose has been congested and may have been irritated from blowing it. Encouraged her to call if it happens again r/t her Eliquis so we can report it to MD. No other new issues. No questions about her medications. SN visit scheduled for Monday.

## 2022-08-22 ENCOUNTER — HOME CARE VISIT (OUTPATIENT)
Dept: HOME HEALTH SERVICES | Facility: HOME HEALTHCARE | Age: 77
End: 2022-08-22

## 2022-08-22 VITALS
TEMPERATURE: 97 F | SYSTOLIC BLOOD PRESSURE: 104 MMHG | RESPIRATION RATE: 18 BRPM | OXYGEN SATURATION: 97 % | HEART RATE: 98 BPM | DIASTOLIC BLOOD PRESSURE: 62 MMHG

## 2022-08-22 PROCEDURE — G0299 HHS/HOSPICE OF RN EA 15 MIN: HCPCS

## 2022-08-22 NOTE — HOME HEALTH
pt. doing better she states. Pt. to call and reschedule appt. for cardiologist.  Pt. is hesitant about Physical therapy, states she doesn't like people bothering.  Pt. educated today on weighing, elevating BLE.             Dr. Omalley appt. for this week rescheduled for next week d/t no transportation.    Next SN visit: CP assess, pain assess, falls assess, edema assess

## 2022-08-23 ENCOUNTER — HOME CARE VISIT (OUTPATIENT)
Dept: HOME HEALTH SERVICES | Facility: HOME HEALTHCARE | Age: 77
End: 2022-08-23

## 2022-08-25 ENCOUNTER — HOME CARE VISIT (OUTPATIENT)
Dept: HOME HEALTH SERVICES | Facility: HOME HEALTHCARE | Age: 77
End: 2022-08-25

## 2022-08-26 ENCOUNTER — HOME CARE VISIT (OUTPATIENT)
Dept: HOME HEALTH SERVICES | Facility: HOME HEALTHCARE | Age: 77
End: 2022-08-26

## 2022-08-28 LAB — QT INTERVAL: 484 MS

## 2022-08-29 ENCOUNTER — HOME CARE VISIT (OUTPATIENT)
Dept: HOME HEALTH SERVICES | Facility: HOME HEALTHCARE | Age: 77
End: 2022-08-29

## 2022-12-05 ENCOUNTER — LAB (OUTPATIENT)
Dept: LAB | Facility: HOSPITAL | Age: 77
End: 2022-12-05

## 2022-12-05 DIAGNOSIS — Z79.891 ENCOUNTER FOR LONG-TERM METHADONE USE: ICD-10-CM

## 2022-12-05 DIAGNOSIS — Z13.1 SCREENING FOR DIABETES MELLITUS: ICD-10-CM

## 2022-12-05 DIAGNOSIS — Z94.0 KIDNEY REPLACED BY TRANSPLANT: ICD-10-CM

## 2022-12-05 DIAGNOSIS — Z94.0 TRANSPLANTED KIDNEY: ICD-10-CM

## 2022-12-05 DIAGNOSIS — Z79.891 ENCOUNTER FOR LONG-TERM OPIATE ANALGESIC USE: ICD-10-CM

## 2022-12-05 DIAGNOSIS — Z00.00 ROUTINE GENERAL MEDICAL EXAMINATION AT A HEALTH CARE FACILITY: ICD-10-CM

## 2022-12-05 LAB
ALBUMIN SERPL-MCNC: 4 G/DL (ref 3.5–5.2)
ALBUMIN/GLOB SERPL: 1.8 G/DL
ALP SERPL-CCNC: 52 U/L (ref 39–117)
ALT SERPL W P-5'-P-CCNC: <5 U/L (ref 1–33)
ANION GAP SERPL CALCULATED.3IONS-SCNC: 8 MMOL/L (ref 5–15)
AST SERPL-CCNC: 13 U/L (ref 1–32)
BASOPHILS # BLD AUTO: 0.03 10*3/MM3 (ref 0–0.2)
BASOPHILS NFR BLD AUTO: 0.5 % (ref 0–1.5)
BILIRUB SERPL-MCNC: 0.9 MG/DL (ref 0–1.2)
BUN SERPL-MCNC: 11 MG/DL (ref 8–23)
BUN/CREAT SERPL: 16.4 (ref 7–25)
CALCIUM SPEC-SCNC: 10.1 MG/DL (ref 8.6–10.5)
CHLORIDE SERPL-SCNC: 106 MMOL/L (ref 98–107)
CHOLEST SERPL-MCNC: 163 MG/DL (ref 0–200)
CO2 SERPL-SCNC: 29 MMOL/L (ref 22–29)
CREAT SERPL-MCNC: 0.67 MG/DL (ref 0.57–1)
CREAT UR-MCNC: 202.8 MG/DL
DEPRECATED RDW RBC AUTO: 45.7 FL (ref 37–54)
EGFRCR SERPLBLD CKD-EPI 2021: 91.3 ML/MIN/1.73
EOSINOPHIL # BLD AUTO: 0.03 10*3/MM3 (ref 0–0.4)
EOSINOPHIL NFR BLD AUTO: 0.5 % (ref 0.3–6.2)
ERYTHROCYTE [DISTWIDTH] IN BLOOD BY AUTOMATED COUNT: 13 % (ref 12.3–15.4)
GLOBULIN UR ELPH-MCNC: 2.2 GM/DL
GLUCOSE SERPL-MCNC: 88 MG/DL (ref 65–99)
HCT VFR BLD AUTO: 34.6 % (ref 34–46.6)
HGB BLD-MCNC: 11.4 G/DL (ref 12–15.9)
IMM GRANULOCYTES # BLD AUTO: 0.02 10*3/MM3 (ref 0–0.05)
IMM GRANULOCYTES NFR BLD AUTO: 0.3 % (ref 0–0.5)
LYMPHOCYTES # BLD AUTO: 0.32 10*3/MM3 (ref 0.7–3.1)
LYMPHOCYTES NFR BLD AUTO: 5 % (ref 19.6–45.3)
MCH RBC QN AUTO: 31.8 PG (ref 26.6–33)
MCHC RBC AUTO-ENTMCNC: 32.9 G/DL (ref 31.5–35.7)
MCV RBC AUTO: 96.4 FL (ref 79–97)
MONOCYTES # BLD AUTO: 0.43 10*3/MM3 (ref 0.1–0.9)
MONOCYTES NFR BLD AUTO: 6.7 % (ref 5–12)
NEUTROPHILS NFR BLD AUTO: 5.56 10*3/MM3 (ref 1.7–7)
NEUTROPHILS NFR BLD AUTO: 87 % (ref 42.7–76)
NRBC BLD AUTO-RTO: 0 /100 WBC (ref 0–0.2)
PLATELET # BLD AUTO: 119 10*3/MM3 (ref 140–450)
PMV BLD AUTO: 9.5 FL (ref 6–12)
POTASSIUM SERPL-SCNC: 3.6 MMOL/L (ref 3.5–5.2)
PROT ?TM UR-MCNC: 20.3 MG/DL
PROT SERPL-MCNC: 6.2 G/DL (ref 6–8.5)
PROT/CREAT UR: 100.1 MG/G CREA (ref 0–200)
RBC # BLD AUTO: 3.59 10*6/MM3 (ref 3.77–5.28)
SODIUM SERPL-SCNC: 143 MMOL/L (ref 136–145)
TRIGL SERPL-MCNC: 96 MG/DL (ref 0–150)
WBC NRBC COR # BLD: 6.39 10*3/MM3 (ref 3.4–10.8)

## 2022-12-05 PROCEDURE — 80197 ASSAY OF TACROLIMUS: CPT

## 2022-12-05 PROCEDURE — 84478 ASSAY OF TRIGLYCERIDES: CPT

## 2022-12-05 PROCEDURE — 87799 DETECT AGENT NOS DNA QUANT: CPT

## 2022-12-05 PROCEDURE — 36415 COLL VENOUS BLD VENIPUNCTURE: CPT

## 2022-12-05 PROCEDURE — 82465 ASSAY BLD/SERUM CHOLESTEROL: CPT

## 2022-12-05 PROCEDURE — 82570 ASSAY OF URINE CREATININE: CPT

## 2022-12-05 PROCEDURE — 85025 COMPLETE CBC W/AUTO DIFF WBC: CPT

## 2022-12-05 PROCEDURE — 80053 COMPREHEN METABOLIC PANEL: CPT

## 2022-12-05 PROCEDURE — 84156 ASSAY OF PROTEIN URINE: CPT

## 2022-12-06 LAB — BKV DNA SPEC NAA+PROBE-ACNC: NEGATIVE IU/ML

## 2022-12-07 LAB — TACROLIMUS BLD LC/MS/MS-MCNC: 10 NG/ML (ref 2–20)

## 2023-03-16 ENCOUNTER — LAB (OUTPATIENT)
Dept: LAB | Facility: HOSPITAL | Age: 78
End: 2023-03-16
Payer: MEDICARE

## 2023-03-16 DIAGNOSIS — Z79.891 ENCOUNTER FOR LONG-TERM OPIATE ANALGESIC USE: ICD-10-CM

## 2023-03-16 DIAGNOSIS — Z00.00 ROUTINE GENERAL MEDICAL EXAMINATION AT A HEALTH CARE FACILITY: ICD-10-CM

## 2023-03-16 DIAGNOSIS — Z94.0 TRANSPLANTED KIDNEY: ICD-10-CM

## 2023-03-16 LAB
ALBUMIN SERPL-MCNC: 4.1 G/DL (ref 3.5–5.2)
ALBUMIN/GLOB SERPL: 1.8 G/DL
ALP SERPL-CCNC: 52 U/L (ref 39–117)
ALT SERPL W P-5'-P-CCNC: 6 U/L (ref 1–33)
ANION GAP SERPL CALCULATED.3IONS-SCNC: 9 MMOL/L (ref 5–15)
AST SERPL-CCNC: 17 U/L (ref 1–32)
BASOPHILS # BLD AUTO: 0.02 10*3/MM3 (ref 0–0.2)
BASOPHILS NFR BLD AUTO: 0.2 % (ref 0–1.5)
BILIRUB SERPL-MCNC: 1.1 MG/DL (ref 0–1.2)
BUN SERPL-MCNC: 15 MG/DL (ref 8–23)
BUN/CREAT SERPL: 20.8 (ref 7–25)
CALCIUM SPEC-SCNC: 9.9 MG/DL (ref 8.6–10.5)
CHLORIDE SERPL-SCNC: 104 MMOL/L (ref 98–107)
CHOLEST SERPL-MCNC: 165 MG/DL (ref 0–200)
CO2 SERPL-SCNC: 29 MMOL/L (ref 22–29)
CREAT SERPL-MCNC: 0.72 MG/DL (ref 0.57–1)
CREAT UR-MCNC: 208 MG/DL
DEPRECATED RDW RBC AUTO: 46.4 FL (ref 37–54)
EGFRCR SERPLBLD CKD-EPI 2021: 86.8 ML/MIN/1.73
EOSINOPHIL # BLD AUTO: 0.04 10*3/MM3 (ref 0–0.4)
EOSINOPHIL NFR BLD AUTO: 0.5 % (ref 0.3–6.2)
ERYTHROCYTE [DISTWIDTH] IN BLOOD BY AUTOMATED COUNT: 13.1 % (ref 12.3–15.4)
GLOBULIN UR ELPH-MCNC: 2.3 GM/DL
GLUCOSE SERPL-MCNC: 100 MG/DL (ref 65–99)
HCT VFR BLD AUTO: 35.8 % (ref 34–46.6)
HGB BLD-MCNC: 11.4 G/DL (ref 12–15.9)
IMM GRANULOCYTES # BLD AUTO: 0.03 10*3/MM3 (ref 0–0.05)
IMM GRANULOCYTES NFR BLD AUTO: 0.3 % (ref 0–0.5)
LYMPHOCYTES # BLD AUTO: 0.5 10*3/MM3 (ref 0.7–3.1)
LYMPHOCYTES NFR BLD AUTO: 5.8 % (ref 19.6–45.3)
MCH RBC QN AUTO: 30.7 PG (ref 26.6–33)
MCHC RBC AUTO-ENTMCNC: 31.8 G/DL (ref 31.5–35.7)
MCV RBC AUTO: 96.5 FL (ref 79–97)
MONOCYTES # BLD AUTO: 0.68 10*3/MM3 (ref 0.1–0.9)
MONOCYTES NFR BLD AUTO: 7.9 % (ref 5–12)
NEUTROPHILS NFR BLD AUTO: 7.34 10*3/MM3 (ref 1.7–7)
NEUTROPHILS NFR BLD AUTO: 85.3 % (ref 42.7–76)
NRBC BLD AUTO-RTO: 0 /100 WBC (ref 0–0.2)
PLATELET # BLD AUTO: 143 10*3/MM3 (ref 140–450)
PMV BLD AUTO: 9.5 FL (ref 6–12)
POTASSIUM SERPL-SCNC: 4.1 MMOL/L (ref 3.5–5.2)
PROT ?TM UR-MCNC: 23.7 MG/DL
PROT SERPL-MCNC: 6.4 G/DL (ref 6–8.5)
PROT/CREAT UR: 113.9 MG/G CREA (ref 0–200)
RBC # BLD AUTO: 3.71 10*6/MM3 (ref 3.77–5.28)
SODIUM SERPL-SCNC: 142 MMOL/L (ref 136–145)
TRIGL SERPL-MCNC: 80 MG/DL (ref 0–150)
WBC NRBC COR # BLD: 8.61 10*3/MM3 (ref 3.4–10.8)

## 2023-03-16 PROCEDURE — 80053 COMPREHEN METABOLIC PANEL: CPT

## 2023-03-16 PROCEDURE — 84156 ASSAY OF PROTEIN URINE: CPT

## 2023-03-16 PROCEDURE — 85025 COMPLETE CBC W/AUTO DIFF WBC: CPT

## 2023-03-16 PROCEDURE — 82570 ASSAY OF URINE CREATININE: CPT

## 2023-03-16 PROCEDURE — 36415 COLL VENOUS BLD VENIPUNCTURE: CPT

## 2023-03-16 PROCEDURE — 80197 ASSAY OF TACROLIMUS: CPT

## 2023-03-16 PROCEDURE — 84478 ASSAY OF TRIGLYCERIDES: CPT

## 2023-03-16 PROCEDURE — 87799 DETECT AGENT NOS DNA QUANT: CPT

## 2023-03-16 PROCEDURE — 82465 ASSAY BLD/SERUM CHOLESTEROL: CPT

## 2023-03-18 LAB
BKV DNA SPEC NAA+PROBE-ACNC: NEGATIVE IU/ML
TACROLIMUS BLD LC/MS/MS-MCNC: 4 NG/ML (ref 2–20)

## 2023-11-09 ENCOUNTER — LAB (OUTPATIENT)
Dept: LAB | Facility: HOSPITAL | Age: 78
End: 2023-11-09
Payer: MEDICARE

## 2023-11-09 DIAGNOSIS — Z94.0 KIDNEY REPLACED BY TRANSPLANT: ICD-10-CM

## 2023-11-09 DIAGNOSIS — Z00.00 ROUTINE GENERAL MEDICAL EXAMINATION AT A HEALTH CARE FACILITY: ICD-10-CM

## 2023-11-09 DIAGNOSIS — Z79.891 ENCOUNTER FOR LONG-TERM METHADONE USE: ICD-10-CM

## 2023-11-09 LAB
ALBUMIN SERPL-MCNC: 4.3 G/DL (ref 3.5–5.2)
ALBUMIN/GLOB SERPL: 2 G/DL
ALP SERPL-CCNC: 58 U/L (ref 39–117)
ALT SERPL W P-5'-P-CCNC: 9 U/L (ref 1–33)
ANION GAP SERPL CALCULATED.3IONS-SCNC: 9 MMOL/L (ref 5–15)
AST SERPL-CCNC: 16 U/L (ref 1–32)
BACTERIA UR QL AUTO: ABNORMAL /HPF
BASOPHILS # BLD AUTO: 0.02 10*3/MM3 (ref 0–0.2)
BASOPHILS NFR BLD AUTO: 0.3 % (ref 0–1.5)
BILIRUB SERPL-MCNC: 1.1 MG/DL (ref 0–1.2)
BILIRUB UR QL STRIP: NEGATIVE
BUN SERPL-MCNC: 15 MG/DL (ref 8–23)
BUN/CREAT SERPL: 18.5 (ref 7–25)
CALCIUM SPEC-SCNC: 10.2 MG/DL (ref 8.6–10.5)
CHLORIDE SERPL-SCNC: 104 MMOL/L (ref 98–107)
CHOLEST SERPL-MCNC: 153 MG/DL (ref 0–200)
CLARITY UR: ABNORMAL
CO2 SERPL-SCNC: 31 MMOL/L (ref 22–29)
COLOR UR: YELLOW
CREAT SERPL-MCNC: 0.81 MG/DL (ref 0.57–1)
CREAT UR-MCNC: 157.6 MG/DL
DEPRECATED RDW RBC AUTO: 47.2 FL (ref 37–54)
EGFRCR SERPLBLD CKD-EPI 2021: 75.3 ML/MIN/1.73
EOSINOPHIL # BLD AUTO: 0.02 10*3/MM3 (ref 0–0.4)
EOSINOPHIL NFR BLD AUTO: 0.3 % (ref 0.3–6.2)
ERYTHROCYTE [DISTWIDTH] IN BLOOD BY AUTOMATED COUNT: 13.6 % (ref 12.3–15.4)
GLOBULIN UR ELPH-MCNC: 2.2 GM/DL
GLUCOSE SERPL-MCNC: 116 MG/DL (ref 65–99)
GLUCOSE UR STRIP-MCNC: NEGATIVE MG/DL
HCT VFR BLD AUTO: 35.2 % (ref 34–46.6)
HGB BLD-MCNC: 11.6 G/DL (ref 12–15.9)
HGB UR QL STRIP.AUTO: ABNORMAL
HOLD SPECIMEN: NORMAL
HYALINE CASTS UR QL AUTO: ABNORMAL /LPF
IMM GRANULOCYTES # BLD AUTO: 0.04 10*3/MM3 (ref 0–0.05)
IMM GRANULOCYTES NFR BLD AUTO: 0.5 % (ref 0–0.5)
KETONES UR QL STRIP: ABNORMAL
LEUKOCYTE ESTERASE UR QL STRIP.AUTO: ABNORMAL
LYMPHOCYTES # BLD AUTO: 0.5 10*3/MM3 (ref 0.7–3.1)
LYMPHOCYTES NFR BLD AUTO: 6.4 % (ref 19.6–45.3)
MCH RBC QN AUTO: 31.5 PG (ref 26.6–33)
MCHC RBC AUTO-ENTMCNC: 33 G/DL (ref 31.5–35.7)
MCV RBC AUTO: 95.7 FL (ref 79–97)
MONOCYTES # BLD AUTO: 0.51 10*3/MM3 (ref 0.1–0.9)
MONOCYTES NFR BLD AUTO: 6.6 % (ref 5–12)
NEUTROPHILS NFR BLD AUTO: 6.68 10*3/MM3 (ref 1.7–7)
NEUTROPHILS NFR BLD AUTO: 85.9 % (ref 42.7–76)
NITRITE UR QL STRIP: POSITIVE
NRBC BLD AUTO-RTO: 0 /100 WBC (ref 0–0.2)
PH UR STRIP.AUTO: 6 [PH] (ref 5–8)
PLATELET # BLD AUTO: 127 10*3/MM3 (ref 140–450)
PMV BLD AUTO: 10.1 FL (ref 6–12)
POTASSIUM SERPL-SCNC: 3.6 MMOL/L (ref 3.5–5.2)
PROT ?TM UR-MCNC: 23.7 MG/DL
PROT SERPL-MCNC: 6.5 G/DL (ref 6–8.5)
PROT UR QL STRIP: ABNORMAL
PROT/CREAT UR: 150.4 MG/G CREA (ref 0–200)
RBC # BLD AUTO: 3.68 10*6/MM3 (ref 3.77–5.28)
RBC # UR STRIP: ABNORMAL /HPF
REF LAB TEST METHOD: ABNORMAL
SODIUM SERPL-SCNC: 144 MMOL/L (ref 136–145)
SP GR UR STRIP: 1.02 (ref 1–1.03)
SQUAMOUS #/AREA URNS HPF: ABNORMAL /HPF
TRIGL SERPL-MCNC: 110 MG/DL (ref 0–150)
UROBILINOGEN UR QL STRIP: ABNORMAL
WBC # UR STRIP: ABNORMAL /HPF
WBC NRBC COR # BLD: 7.77 10*3/MM3 (ref 3.4–10.8)

## 2023-11-09 PROCEDURE — 84478 ASSAY OF TRIGLYCERIDES: CPT

## 2023-11-09 PROCEDURE — 81001 URINALYSIS AUTO W/SCOPE: CPT

## 2023-11-09 PROCEDURE — 80053 COMPREHEN METABOLIC PANEL: CPT

## 2023-11-09 PROCEDURE — 84156 ASSAY OF PROTEIN URINE: CPT

## 2023-11-09 PROCEDURE — 85025 COMPLETE CBC W/AUTO DIFF WBC: CPT

## 2023-11-09 PROCEDURE — 82570 ASSAY OF URINE CREATININE: CPT

## 2023-11-09 PROCEDURE — 87186 SC STD MICRODIL/AGAR DIL: CPT

## 2023-11-09 PROCEDURE — 36415 COLL VENOUS BLD VENIPUNCTURE: CPT

## 2023-11-09 PROCEDURE — 80197 ASSAY OF TACROLIMUS: CPT

## 2023-11-09 PROCEDURE — 87088 URINE BACTERIA CULTURE: CPT

## 2023-11-09 PROCEDURE — 82465 ASSAY BLD/SERUM CHOLESTEROL: CPT

## 2023-11-09 PROCEDURE — 87799 DETECT AGENT NOS DNA QUANT: CPT

## 2023-11-09 PROCEDURE — 87086 URINE CULTURE/COLONY COUNT: CPT

## 2023-11-10 LAB — BKV DNA SPEC NAA+PROBE-ACNC: NEGATIVE IU/ML

## 2023-11-11 LAB — BACTERIA SPEC AEROBE CULT: ABNORMAL

## 2023-11-12 LAB — TACROLIMUS BLD LC/MS/MS-MCNC: 4.2 NG/ML (ref 2–20)

## 2024-02-19 ENCOUNTER — TRANSCRIBE ORDERS (OUTPATIENT)
Dept: ADMINISTRATIVE | Facility: HOSPITAL | Age: 79
End: 2024-02-19
Payer: MEDICARE

## 2024-02-19 ENCOUNTER — LAB (OUTPATIENT)
Dept: LAB | Facility: HOSPITAL | Age: 79
End: 2024-02-19
Payer: MEDICARE

## 2024-02-19 DIAGNOSIS — Z79.891 ENCOUNTER FOR LONG-TERM METHADONE USE: ICD-10-CM

## 2024-02-19 DIAGNOSIS — Z79.891 ENCOUNTER FOR LONG-TERM METHADONE USE: Primary | ICD-10-CM

## 2024-02-19 LAB
ALBUMIN SERPL-MCNC: 4.4 G/DL (ref 3.5–5.2)
ALBUMIN/GLOB SERPL: 2.2 G/DL
ALP SERPL-CCNC: 53 U/L (ref 39–117)
ALT SERPL W P-5'-P-CCNC: 7 U/L (ref 1–33)
ANION GAP SERPL CALCULATED.3IONS-SCNC: 10 MMOL/L (ref 5–15)
AST SERPL-CCNC: 15 U/L (ref 1–32)
BACTERIA UR QL AUTO: ABNORMAL /HPF
BASOPHILS # BLD AUTO: 0.03 10*3/MM3 (ref 0–0.2)
BASOPHILS NFR BLD AUTO: 0.4 % (ref 0–1.5)
BILIRUB SERPL-MCNC: 1.7 MG/DL (ref 0–1.2)
BILIRUB UR QL STRIP: NEGATIVE
BUN SERPL-MCNC: 14 MG/DL (ref 8–23)
BUN/CREAT SERPL: 17.1 (ref 7–25)
CALCIUM SPEC-SCNC: 9.9 MG/DL (ref 8.6–10.5)
CHLORIDE SERPL-SCNC: 105 MMOL/L (ref 98–107)
CHOLEST SERPL-MCNC: 128 MG/DL (ref 0–200)
CLARITY UR: CLEAR
CO2 SERPL-SCNC: 27 MMOL/L (ref 22–29)
COLOR UR: YELLOW
CREAT SERPL-MCNC: 0.82 MG/DL (ref 0.57–1)
CREAT UR-MCNC: 32 MG/DL
DEPRECATED RDW RBC AUTO: 51.5 FL (ref 37–54)
EGFRCR SERPLBLD CKD-EPI 2021: 73.8 ML/MIN/1.73
EOSINOPHIL # BLD AUTO: 0.04 10*3/MM3 (ref 0–0.4)
EOSINOPHIL NFR BLD AUTO: 0.6 % (ref 0.3–6.2)
ERYTHROCYTE [DISTWIDTH] IN BLOOD BY AUTOMATED COUNT: 15.1 % (ref 12.3–15.4)
GLOBULIN UR ELPH-MCNC: 2 GM/DL
GLUCOSE SERPL-MCNC: 114 MG/DL (ref 65–99)
GLUCOSE UR STRIP-MCNC: NEGATIVE MG/DL
HCT VFR BLD AUTO: 34.3 % (ref 34–46.6)
HGB BLD-MCNC: 10.7 G/DL (ref 12–15.9)
HGB UR QL STRIP.AUTO: NEGATIVE
HOLD SPECIMEN: NORMAL
HYALINE CASTS UR QL AUTO: ABNORMAL /LPF
IMM GRANULOCYTES # BLD AUTO: 0.02 10*3/MM3 (ref 0–0.05)
IMM GRANULOCYTES NFR BLD AUTO: 0.3 % (ref 0–0.5)
KETONES UR QL STRIP: NEGATIVE
LEUKOCYTE ESTERASE UR QL STRIP.AUTO: ABNORMAL
LYMPHOCYTES # BLD AUTO: 0.34 10*3/MM3 (ref 0.7–3.1)
LYMPHOCYTES NFR BLD AUTO: 4.8 % (ref 19.6–45.3)
MCH RBC QN AUTO: 29.5 PG (ref 26.6–33)
MCHC RBC AUTO-ENTMCNC: 31.2 G/DL (ref 31.5–35.7)
MCV RBC AUTO: 94.5 FL (ref 79–97)
MONOCYTES # BLD AUTO: 0.39 10*3/MM3 (ref 0.1–0.9)
MONOCYTES NFR BLD AUTO: 5.5 % (ref 5–12)
NEUTROPHILS NFR BLD AUTO: 6.29 10*3/MM3 (ref 1.7–7)
NEUTROPHILS NFR BLD AUTO: 88.4 % (ref 42.7–76)
NITRITE UR QL STRIP: POSITIVE
NRBC BLD AUTO-RTO: 0 /100 WBC (ref 0–0.2)
PH UR STRIP.AUTO: 7 [PH] (ref 5–8)
PLATELET # BLD AUTO: 128 10*3/MM3 (ref 140–450)
PMV BLD AUTO: 10.1 FL (ref 6–12)
POTASSIUM SERPL-SCNC: 3.7 MMOL/L (ref 3.5–5.2)
PROT ?TM UR-MCNC: 6.6 MG/DL
PROT SERPL-MCNC: 6.4 G/DL (ref 6–8.5)
PROT UR QL STRIP: NEGATIVE
PROT/CREAT UR: 206.3 MG/G CREA (ref 0–200)
RBC # BLD AUTO: 3.63 10*6/MM3 (ref 3.77–5.28)
RBC # UR STRIP: ABNORMAL /HPF
REF LAB TEST METHOD: ABNORMAL
SODIUM SERPL-SCNC: 142 MMOL/L (ref 136–145)
SP GR UR STRIP: 1.01 (ref 1–1.03)
SQUAMOUS #/AREA URNS HPF: ABNORMAL /HPF
TRIGL SERPL-MCNC: 91 MG/DL (ref 0–150)
UROBILINOGEN UR QL STRIP: ABNORMAL
WBC # UR STRIP: ABNORMAL /HPF
WBC NRBC COR # BLD AUTO: 7.11 10*3/MM3 (ref 3.4–10.8)

## 2024-02-19 PROCEDURE — 82465 ASSAY BLD/SERUM CHOLESTEROL: CPT

## 2024-02-19 PROCEDURE — 84478 ASSAY OF TRIGLYCERIDES: CPT

## 2024-02-19 PROCEDURE — 82570 ASSAY OF URINE CREATININE: CPT

## 2024-02-19 PROCEDURE — 81001 URINALYSIS AUTO W/SCOPE: CPT

## 2024-02-19 PROCEDURE — 87086 URINE CULTURE/COLONY COUNT: CPT

## 2024-02-19 PROCEDURE — 80053 COMPREHEN METABOLIC PANEL: CPT

## 2024-02-19 PROCEDURE — 84156 ASSAY OF PROTEIN URINE: CPT

## 2024-02-19 PROCEDURE — 85025 COMPLETE CBC W/AUTO DIFF WBC: CPT

## 2024-02-19 PROCEDURE — 87799 DETECT AGENT NOS DNA QUANT: CPT

## 2024-02-19 PROCEDURE — 87077 CULTURE AEROBIC IDENTIFY: CPT

## 2024-02-19 PROCEDURE — 36415 COLL VENOUS BLD VENIPUNCTURE: CPT

## 2024-02-19 PROCEDURE — 80197 ASSAY OF TACROLIMUS: CPT

## 2024-02-19 PROCEDURE — 87186 SC STD MICRODIL/AGAR DIL: CPT

## 2024-02-21 LAB
BACTERIA SPEC AEROBE CULT: ABNORMAL
BKV DNA SPEC NAA+PROBE-ACNC: NORMAL IU/ML

## 2024-02-22 LAB — TACROLIMUS BLD LC/MS/MS-MCNC: 5.8 NG/ML (ref 2–20)

## 2024-05-17 ENCOUNTER — HOSPITAL ENCOUNTER (INPATIENT)
Facility: HOSPITAL | Age: 79
LOS: 4 days | Discharge: HOME | End: 2024-05-22
Attending: INTERNAL MEDICINE | Admitting: FAMILY MEDICINE
Payer: MEDICARE

## 2024-05-17 ENCOUNTER — APPOINTMENT (OUTPATIENT)
Dept: GENERAL RADIOLOGY | Facility: HOSPITAL | Age: 79
End: 2024-05-17
Payer: MEDICARE

## 2024-05-17 ENCOUNTER — APPOINTMENT (OUTPATIENT)
Dept: CT IMAGING | Facility: HOSPITAL | Age: 79
End: 2024-05-17
Payer: MEDICARE
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
## 2024-05-17 DIAGNOSIS — R53.1 GENERALIZED WEAKNESS: ICD-10-CM

## 2024-05-17 DIAGNOSIS — N39.0 ACUTE UTI: Primary | ICD-10-CM

## 2024-05-17 DIAGNOSIS — M79.89 LEG SWELLING: ICD-10-CM

## 2024-05-17 DIAGNOSIS — E87.70 HYPERVOLEMIA, UNSPECIFIED HYPERVOLEMIA TYPE: ICD-10-CM

## 2024-05-17 LAB
ALBUMIN SERPL-MCNC: 4 G/DL (ref 3.5–5.2)
ALBUMIN/GLOB SERPL: 1.7 G/DL
ALP SERPL-CCNC: 65 U/L (ref 39–117)
ALT SERPL W P-5'-P-CCNC: 12 U/L (ref 1–33)
ANION GAP SERPL CALCULATED.3IONS-SCNC: 16 MMOL/L (ref 5–15)
ARTERIAL PATENCY WRIST A: POSITIVE
AST SERPL-CCNC: 24 U/L (ref 1–32)
ATMOSPHERIC PRESS: ABNORMAL MM[HG]
B PARAPERT DNA SPEC QL NAA+PROBE: NOT DETECTED
B PERT DNA SPEC QL NAA+PROBE: NOT DETECTED
BACTERIA UR QL AUTO: ABNORMAL /HPF
BASE EXCESS BLDA CALC-SCNC: -4.6 MMOL/L (ref 0–3)
BASOPHILS # BLD AUTO: 0.03 10*3/MM3 (ref 0–0.2)
BASOPHILS NFR BLD AUTO: 0.3 % (ref 0–1.5)
BDY SITE: ABNORMAL
BILIRUB SERPL-MCNC: 2.5 MG/DL (ref 0–1.2)
BILIRUB UR QL STRIP: ABNORMAL
BUN SERPL-MCNC: 22 MG/DL (ref 8–23)
BUN/CREAT SERPL: 22.7 (ref 7–25)
C PNEUM DNA NPH QL NAA+NON-PROBE: NOT DETECTED
CALCIUM SPEC-SCNC: 10.1 MG/DL (ref 8.6–10.5)
CHLORIDE SERPL-SCNC: 104 MMOL/L (ref 98–107)
CK SERPL-CCNC: 208 U/L (ref 20–180)
CLARITY UR: ABNORMAL
CO2 BLDA-SCNC: 22.5 MMOL/L (ref 22–29)
CO2 SERPL-SCNC: 21 MMOL/L (ref 22–29)
COD CRY URNS QL: ABNORMAL /HPF
COLOR UR: ABNORMAL
CREAT SERPL-MCNC: 0.97 MG/DL (ref 0.57–1)
DEPRECATED RDW RBC AUTO: 62.3 FL (ref 37–54)
EGFRCR SERPLBLD CKD-EPI 2021: 60.3 ML/MIN/1.73
EOSINOPHIL # BLD AUTO: 0 10*3/MM3 (ref 0–0.4)
EOSINOPHIL NFR BLD AUTO: 0 % (ref 0.3–6.2)
ERYTHROCYTE [DISTWIDTH] IN BLOOD BY AUTOMATED COUNT: 17.1 % (ref 12.3–15.4)
FLUAV SUBTYP SPEC NAA+PROBE: NOT DETECTED
FLUBV RNA ISLT QL NAA+PROBE: NOT DETECTED
GLOBULIN UR ELPH-MCNC: 2.4 GM/DL
GLUCOSE SERPL-MCNC: 145 MG/DL (ref 65–99)
GLUCOSE UR STRIP-MCNC: NEGATIVE MG/DL
GRAN CASTS URNS QL MICRO: ABNORMAL /LPF
HADV DNA SPEC NAA+PROBE: NOT DETECTED
HCO3 BLDA-SCNC: 21.3 MMOL/L (ref 21–28)
HCOV 229E RNA SPEC QL NAA+PROBE: NOT DETECTED
HCOV HKU1 RNA SPEC QL NAA+PROBE: NOT DETECTED
HCOV NL63 RNA SPEC QL NAA+PROBE: NOT DETECTED
HCOV OC43 RNA SPEC QL NAA+PROBE: NOT DETECTED
HCT VFR BLD AUTO: 38.8 % (ref 34–46.6)
HEMODILUTION: NO
HGB BLD-MCNC: 11.4 G/DL (ref 12–15.9)
HGB UR QL STRIP.AUTO: ABNORMAL
HMPV RNA NPH QL NAA+NON-PROBE: NOT DETECTED
HPIV1 RNA ISLT QL NAA+PROBE: NOT DETECTED
HPIV2 RNA SPEC QL NAA+PROBE: NOT DETECTED
HPIV3 RNA NPH QL NAA+PROBE: NOT DETECTED
HPIV4 P GENE NPH QL NAA+PROBE: NOT DETECTED
HYALINE CASTS UR QL AUTO: ABNORMAL /LPF
IMM GRANULOCYTES # BLD AUTO: 0.07 10*3/MM3 (ref 0–0.05)
IMM GRANULOCYTES NFR BLD AUTO: 0.7 % (ref 0–0.5)
INHALED O2 CONCENTRATION: 44 %
KETONES UR QL STRIP: ABNORMAL
LEUKOCYTE ESTERASE UR QL STRIP.AUTO: ABNORMAL
LYMPHOCYTES # BLD AUTO: 0.26 10*3/MM3 (ref 0.7–3.1)
LYMPHOCYTES NFR BLD AUTO: 2.7 % (ref 19.6–45.3)
M PNEUMO IGG SER IA-ACNC: NOT DETECTED
MCH RBC QN AUTO: 29.2 PG (ref 26.6–33)
MCHC RBC AUTO-ENTMCNC: 29.4 G/DL (ref 31.5–35.7)
MCV RBC AUTO: 99.2 FL (ref 79–97)
MODALITY: ABNORMAL
MONOCYTES # BLD AUTO: 0.81 10*3/MM3 (ref 0.1–0.9)
MONOCYTES NFR BLD AUTO: 8.3 % (ref 5–12)
NEUTROPHILS NFR BLD AUTO: 8.58 10*3/MM3 (ref 1.7–7)
NEUTROPHILS NFR BLD AUTO: 88 % (ref 42.7–76)
NITRITE UR QL STRIP: POSITIVE
NRBC BLD AUTO-RTO: 0 /100 WBC (ref 0–0.2)
NT-PROBNP SERPL-MCNC: 8040 PG/ML (ref 0–1800)
PCO2 BLDA: 41.2 MM HG (ref 35–48)
PH BLDA: 7.32 PH UNITS (ref 7.35–7.45)
PH UR STRIP.AUTO: <=5 [PH] (ref 5–8)
PLATELET # BLD AUTO: 125 10*3/MM3 (ref 140–450)
PMV BLD AUTO: 9.7 FL (ref 6–12)
PO2 BLDA: 105.4 MM HG (ref 83–108)
POTASSIUM SERPL-SCNC: 6 MMOL/L (ref 3.5–5.2)
PROT SERPL-MCNC: 6.4 G/DL (ref 6–8.5)
PROT UR QL STRIP: ABNORMAL
RBC # BLD AUTO: 3.91 10*6/MM3 (ref 3.77–5.28)
RBC # UR STRIP: ABNORMAL /HPF
REF LAB TEST METHOD: ABNORMAL
RHINOVIRUS RNA SPEC NAA+PROBE: NOT DETECTED
RSV RNA NPH QL NAA+NON-PROBE: NOT DETECTED
SAO2 % BLDCOA: 97.6 % (ref 94–98)
SARS-COV-2 RNA NPH QL NAA+NON-PROBE: NOT DETECTED
SODIUM SERPL-SCNC: 141 MMOL/L (ref 136–145)
SP GR UR STRIP: 1.02 (ref 1–1.03)
SQUAMOUS #/AREA URNS HPF: ABNORMAL /HPF
TROPONIN T SERPL HS-MCNC: 29 NG/L
UROBILINOGEN UR QL STRIP: ABNORMAL
WBC # UR STRIP: ABNORMAL /HPF
WBC NRBC COR # BLD AUTO: 9.75 10*3/MM3 (ref 3.4–10.8)

## 2024-05-17 PROCEDURE — 81001 URINALYSIS AUTO W/SCOPE: CPT

## 2024-05-17 PROCEDURE — 82803 BLOOD GASES ANY COMBINATION: CPT

## 2024-05-17 PROCEDURE — 85025 COMPLETE CBC W/AUTO DIFF WBC: CPT

## 2024-05-17 PROCEDURE — 87086 URINE CULTURE/COLONY COUNT: CPT

## 2024-05-17 PROCEDURE — 0202U NFCT DS 22 TRGT SARS-COV-2: CPT

## 2024-05-17 PROCEDURE — 80053 COMPREHEN METABOLIC PANEL: CPT

## 2024-05-17 PROCEDURE — 93005 ELECTROCARDIOGRAM TRACING: CPT | Performed by: INTERNAL MEDICINE

## 2024-05-17 PROCEDURE — 99285 EMERGENCY DEPT VISIT HI MDM: CPT

## 2024-05-17 PROCEDURE — 71045 X-RAY EXAM CHEST 1 VIEW: CPT

## 2024-05-17 PROCEDURE — 87186 SC STD MICRODIL/AGAR DIL: CPT

## 2024-05-17 PROCEDURE — 70450 CT HEAD/BRAIN W/O DYE: CPT

## 2024-05-17 PROCEDURE — 83880 ASSAY OF NATRIURETIC PEPTIDE: CPT

## 2024-05-17 PROCEDURE — 36600 WITHDRAWAL OF ARTERIAL BLOOD: CPT

## 2024-05-17 PROCEDURE — 82550 ASSAY OF CK (CPK): CPT

## 2024-05-17 PROCEDURE — 87077 CULTURE AEROBIC IDENTIFY: CPT

## 2024-05-17 PROCEDURE — 72125 CT NECK SPINE W/O DYE: CPT

## 2024-05-17 PROCEDURE — 93005 ELECTROCARDIOGRAM TRACING: CPT

## 2024-05-17 PROCEDURE — 84484 ASSAY OF TROPONIN QUANT: CPT

## 2024-05-17 RX ORDER — SODIUM CHLORIDE 0.9 % (FLUSH) 0.9 %
10 SYRINGE (ML) INJECTION AS NEEDED
Status: DISCONTINUED | OUTPATIENT
Start: 2024-05-17 | End: 2024-05-22 | Stop reason: HOSPADM

## 2024-05-18 PROBLEM — N39.0 ACUTE UTI: Status: ACTIVE | Noted: 2024-05-18

## 2024-05-18 LAB
ALBUMIN SERPL-MCNC: 3.9 G/DL (ref 3.5–5.2)
ALBUMIN/GLOB SERPL: 1.7 G/DL
ALP SERPL-CCNC: 58 U/L (ref 39–117)
ALT SERPL W P-5'-P-CCNC: 10 U/L (ref 1–33)
ANION GAP SERPL CALCULATED.3IONS-SCNC: 11 MMOL/L (ref 5–15)
AST SERPL-CCNC: 22 U/L (ref 1–32)
BASOPHILS # BLD AUTO: 0.02 10*3/MM3 (ref 0–0.2)
BASOPHILS NFR BLD AUTO: 0.2 % (ref 0–1.5)
BILIRUB SERPL-MCNC: 1.4 MG/DL (ref 0–1.2)
BUN SERPL-MCNC: 22 MG/DL (ref 8–23)
BUN/CREAT SERPL: 25.6 (ref 7–25)
CALCIUM SPEC-SCNC: 9.8 MG/DL (ref 8.6–10.5)
CHLORIDE SERPL-SCNC: 105 MMOL/L (ref 98–107)
CO2 SERPL-SCNC: 25 MMOL/L (ref 22–29)
CREAT SERPL-MCNC: 0.86 MG/DL (ref 0.57–1)
D-LACTATE SERPL-SCNC: 1.9 MMOL/L (ref 0.3–2)
DEPRECATED RDW RBC AUTO: 61.7 FL (ref 37–54)
EGFRCR SERPLBLD CKD-EPI 2021: 69.7 ML/MIN/1.73
EOSINOPHIL # BLD AUTO: 0.01 10*3/MM3 (ref 0–0.4)
EOSINOPHIL NFR BLD AUTO: 0.1 % (ref 0.3–6.2)
ERYTHROCYTE [DISTWIDTH] IN BLOOD BY AUTOMATED COUNT: 17.2 % (ref 12.3–15.4)
GEN 5 2HR TROPONIN T REFLEX: 30 NG/L
GLOBULIN UR ELPH-MCNC: 2.3 GM/DL
GLUCOSE SERPL-MCNC: 116 MG/DL (ref 65–99)
HCT VFR BLD AUTO: 35.3 % (ref 34–46.6)
HGB BLD-MCNC: 10.4 G/DL (ref 12–15.9)
IMM GRANULOCYTES # BLD AUTO: 0.05 10*3/MM3 (ref 0–0.05)
IMM GRANULOCYTES NFR BLD AUTO: 0.6 % (ref 0–0.5)
IRON 24H UR-MRATE: 31 MCG/DL (ref 37–145)
IRON SATN MFR SERPL: 9 % (ref 20–50)
LYMPHOCYTES # BLD AUTO: 0.55 10*3/MM3 (ref 0.7–3.1)
LYMPHOCYTES NFR BLD AUTO: 6.1 % (ref 19.6–45.3)
MCH RBC QN AUTO: 28.7 PG (ref 26.6–33)
MCHC RBC AUTO-ENTMCNC: 29.5 G/DL (ref 31.5–35.7)
MCV RBC AUTO: 97.5 FL (ref 79–97)
MONOCYTES # BLD AUTO: 0.72 10*3/MM3 (ref 0.1–0.9)
MONOCYTES NFR BLD AUTO: 8 % (ref 5–12)
NEUTROPHILS NFR BLD AUTO: 7.66 10*3/MM3 (ref 1.7–7)
NEUTROPHILS NFR BLD AUTO: 85 % (ref 42.7–76)
NRBC BLD AUTO-RTO: 0 /100 WBC (ref 0–0.2)
PLATELET # BLD AUTO: 116 10*3/MM3 (ref 140–450)
PMV BLD AUTO: 9.8 FL (ref 6–12)
POTASSIUM SERPL-SCNC: 5.1 MMOL/L (ref 3.5–5.2)
PROT SERPL-MCNC: 6.2 G/DL (ref 6–8.5)
QT INTERVAL: 333 MS
QTC INTERVAL: 447 MS
RBC # BLD AUTO: 3.62 10*6/MM3 (ref 3.77–5.28)
SODIUM SERPL-SCNC: 141 MMOL/L (ref 136–145)
TIBC SERPL-MCNC: 332 MCG/DL (ref 298–536)
TRANSFERRIN SERPL-MCNC: 223 MG/DL (ref 200–360)
TROPONIN T DELTA: 1 NG/L
TSH SERPL DL<=0.05 MIU/L-ACNC: 0.65 UIU/ML (ref 0.27–4.2)
WBC NRBC COR # BLD AUTO: 9.01 10*3/MM3 (ref 3.4–10.8)

## 2024-05-18 PROCEDURE — 25010000002 FUROSEMIDE PER 20 MG: Performed by: INTERNAL MEDICINE

## 2024-05-18 PROCEDURE — 80053 COMPREHEN METABOLIC PANEL: CPT | Performed by: NURSE PRACTITIONER

## 2024-05-18 PROCEDURE — 99223 1ST HOSP IP/OBS HIGH 75: CPT | Performed by: INTERNAL MEDICINE

## 2024-05-18 PROCEDURE — 85025 COMPLETE CBC W/AUTO DIFF WBC: CPT | Performed by: NURSE PRACTITIONER

## 2024-05-18 PROCEDURE — 84443 ASSAY THYROID STIM HORMONE: CPT | Performed by: NURSE PRACTITIONER

## 2024-05-18 PROCEDURE — 63710000001 TACROLIMUS PER 1 MG: Performed by: NURSE PRACTITIONER

## 2024-05-18 PROCEDURE — 84466 ASSAY OF TRANSFERRIN: CPT | Performed by: FAMILY MEDICINE

## 2024-05-18 PROCEDURE — 25010000002 CEFTRIAXONE PER 250 MG

## 2024-05-18 PROCEDURE — 87040 BLOOD CULTURE FOR BACTERIA: CPT

## 2024-05-18 PROCEDURE — 25010000002 FUROSEMIDE PER 20 MG

## 2024-05-18 PROCEDURE — 83540 ASSAY OF IRON: CPT | Performed by: FAMILY MEDICINE

## 2024-05-18 PROCEDURE — 84484 ASSAY OF TROPONIN QUANT: CPT

## 2024-05-18 PROCEDURE — 63710000001 PREDNISONE PER 5 MG: Performed by: NURSE PRACTITIONER

## 2024-05-18 PROCEDURE — 36415 COLL VENOUS BLD VENIPUNCTURE: CPT

## 2024-05-18 PROCEDURE — 83605 ASSAY OF LACTIC ACID: CPT

## 2024-05-18 RX ORDER — NITROGLYCERIN 0.4 MG/1
0.4 TABLET SUBLINGUAL
Status: DISCONTINUED | OUTPATIENT
Start: 2024-05-18 | End: 2024-05-22 | Stop reason: HOSPADM

## 2024-05-18 RX ORDER — CARBOXYMETHYLCELLULOSE SODIUM 10 MG/ML
2 GEL OPHTHALMIC 2 TIMES DAILY PRN
Status: DISCONTINUED | OUTPATIENT
Start: 2024-05-18 | End: 2024-05-22 | Stop reason: HOSPADM

## 2024-05-18 RX ORDER — HYDROCODONE BITARTRATE AND ACETAMINOPHEN 7.5; 325 MG/1; MG/1
1 TABLET ORAL 4 TIMES DAILY PRN
Status: DISCONTINUED | OUTPATIENT
Start: 2024-05-18 | End: 2024-05-18

## 2024-05-18 RX ORDER — ONDANSETRON 2 MG/ML
4 INJECTION INTRAMUSCULAR; INTRAVENOUS EVERY 6 HOURS PRN
Status: DISCONTINUED | OUTPATIENT
Start: 2024-05-18 | End: 2024-05-22 | Stop reason: HOSPADM

## 2024-05-18 RX ORDER — AZATHIOPRINE 50 MG/1
50 TABLET ORAL 2 TIMES DAILY
Status: DISCONTINUED | OUTPATIENT
Start: 2024-05-18 | End: 2024-05-18

## 2024-05-18 RX ORDER — FUROSEMIDE 40 MG/1
40 TABLET ORAL 2 TIMES DAILY
Status: DISCONTINUED | OUTPATIENT
Start: 2024-05-18 | End: 2024-05-18

## 2024-05-18 RX ORDER — DIAZEPAM 5 MG/1
5 TABLET ORAL 2 TIMES DAILY PRN
Status: DISCONTINUED | OUTPATIENT
Start: 2024-05-18 | End: 2024-05-22 | Stop reason: HOSPADM

## 2024-05-18 RX ORDER — LEVOTHYROXINE SODIUM 0.05 MG/1
50 TABLET ORAL
Status: DISCONTINUED | OUTPATIENT
Start: 2024-05-18 | End: 2024-05-22 | Stop reason: HOSPADM

## 2024-05-18 RX ORDER — SODIUM CHLORIDE 0.9 % (FLUSH) 0.9 %
10 SYRINGE (ML) INJECTION EVERY 12 HOURS SCHEDULED
Status: DISCONTINUED | OUTPATIENT
Start: 2024-05-18 | End: 2024-05-22 | Stop reason: HOSPADM

## 2024-05-18 RX ORDER — POLYETHYLENE GLYCOL 3350 17 G/17G
17 POWDER, FOR SOLUTION ORAL DAILY PRN
Status: DISCONTINUED | OUTPATIENT
Start: 2024-05-18 | End: 2024-05-22 | Stop reason: HOSPADM

## 2024-05-18 RX ORDER — ATORVASTATIN CALCIUM 10 MG/1
10 TABLET, FILM COATED ORAL NIGHTLY
Status: DISCONTINUED | OUTPATIENT
Start: 2024-05-18 | End: 2024-05-18

## 2024-05-18 RX ORDER — ACETAMINOPHEN 325 MG/1
650 TABLET ORAL EVERY 4 HOURS PRN
Status: DISCONTINUED | OUTPATIENT
Start: 2024-05-18 | End: 2024-05-22 | Stop reason: HOSPADM

## 2024-05-18 RX ORDER — PREDNISONE 5 MG/1
5 TABLET ORAL DAILY
Status: DISCONTINUED | OUTPATIENT
Start: 2024-05-18 | End: 2024-05-22 | Stop reason: HOSPADM

## 2024-05-18 RX ORDER — LOPERAMIDE HYDROCHLORIDE 2 MG/1
2 CAPSULE ORAL 4 TIMES DAILY PRN
Status: DISCONTINUED | OUTPATIENT
Start: 2024-05-18 | End: 2024-05-22 | Stop reason: HOSPADM

## 2024-05-18 RX ORDER — DULOXETIN HYDROCHLORIDE 20 MG/1
40 CAPSULE, DELAYED RELEASE ORAL DAILY
Status: DISCONTINUED | OUTPATIENT
Start: 2024-05-18 | End: 2024-05-22 | Stop reason: HOSPADM

## 2024-05-18 RX ORDER — GUAIFENESIN 600 MG/1
1200 TABLET, EXTENDED RELEASE ORAL 2 TIMES DAILY
Status: DISCONTINUED | OUTPATIENT
Start: 2024-05-18 | End: 2024-05-22 | Stop reason: HOSPADM

## 2024-05-18 RX ORDER — BISACODYL 5 MG/1
5 TABLET, DELAYED RELEASE ORAL DAILY PRN
Status: DISCONTINUED | OUTPATIENT
Start: 2024-05-18 | End: 2024-05-22 | Stop reason: HOSPADM

## 2024-05-18 RX ORDER — AMOXICILLIN 250 MG
2 CAPSULE ORAL 2 TIMES DAILY PRN
Status: DISCONTINUED | OUTPATIENT
Start: 2024-05-18 | End: 2024-05-22 | Stop reason: HOSPADM

## 2024-05-18 RX ORDER — DILTIAZEM HCL/D5W 125 MG/125
5-15 PLASTIC BAG, INJECTION (ML) INTRAVENOUS
Status: DISCONTINUED | OUTPATIENT
Start: 2024-05-18 | End: 2024-05-19

## 2024-05-18 RX ORDER — FUROSEMIDE 10 MG/ML
80 INJECTION INTRAMUSCULAR; INTRAVENOUS ONCE
Status: COMPLETED | OUTPATIENT
Start: 2024-05-18 | End: 2024-05-18

## 2024-05-18 RX ORDER — BISACODYL 10 MG
10 SUPPOSITORY, RECTAL RECTAL DAILY PRN
Status: DISCONTINUED | OUTPATIENT
Start: 2024-05-18 | End: 2024-05-22 | Stop reason: HOSPADM

## 2024-05-18 RX ORDER — SODIUM CHLORIDE 9 MG/ML
40 INJECTION, SOLUTION INTRAVENOUS AS NEEDED
Status: DISCONTINUED | OUTPATIENT
Start: 2024-05-18 | End: 2024-05-22 | Stop reason: HOSPADM

## 2024-05-18 RX ORDER — SODIUM CHLORIDE 0.9 % (FLUSH) 0.9 %
10 SYRINGE (ML) INJECTION AS NEEDED
Status: DISCONTINUED | OUTPATIENT
Start: 2024-05-18 | End: 2024-05-22 | Stop reason: HOSPADM

## 2024-05-18 RX ORDER — POTASSIUM CHLORIDE 20 MEQ/1
20 TABLET, EXTENDED RELEASE ORAL DAILY
Status: DISCONTINUED | OUTPATIENT
Start: 2024-05-18 | End: 2024-05-18

## 2024-05-18 RX ORDER — PANTOPRAZOLE SODIUM 40 MG/10ML
40 INJECTION, POWDER, LYOPHILIZED, FOR SOLUTION INTRAVENOUS
Status: DISCONTINUED | OUTPATIENT
Start: 2024-05-19 | End: 2024-05-22 | Stop reason: HOSPADM

## 2024-05-18 RX ORDER — FUROSEMIDE 10 MG/ML
40 INJECTION INTRAMUSCULAR; INTRAVENOUS 2 TIMES DAILY
Status: DISCONTINUED | OUTPATIENT
Start: 2024-05-18 | End: 2024-05-20

## 2024-05-18 RX ORDER — MONTELUKAST SODIUM 4 MG/1
1 TABLET, CHEWABLE ORAL DAILY
COMMUNITY

## 2024-05-18 RX ORDER — ALBUTEROL SULFATE 2.5 MG/3ML
2.5 SOLUTION RESPIRATORY (INHALATION) EVERY 6 HOURS PRN
Status: DISCONTINUED | OUTPATIENT
Start: 2024-05-18 | End: 2024-05-22 | Stop reason: HOSPADM

## 2024-05-18 RX ORDER — TACROLIMUS 1 MG/1
1 CAPSULE ORAL 2 TIMES DAILY
Status: DISCONTINUED | OUTPATIENT
Start: 2024-05-18 | End: 2024-05-22 | Stop reason: HOSPADM

## 2024-05-18 RX ORDER — METOPROLOL SUCCINATE 50 MG/1
50 TABLET, EXTENDED RELEASE ORAL DAILY
Status: DISCONTINUED | OUTPATIENT
Start: 2024-05-18 | End: 2024-05-22 | Stop reason: HOSPADM

## 2024-05-18 RX ORDER — LEVOTHYROXINE SODIUM 0.1 MG/1
100 TABLET ORAL
Status: DISCONTINUED | OUTPATIENT
Start: 2024-05-18 | End: 2024-05-18 | Stop reason: SDUPTHER

## 2024-05-18 RX ADMIN — METOPROLOL SUCCINATE 50 MG: 50 TABLET, FILM COATED, EXTENDED RELEASE ORAL at 08:57

## 2024-05-18 RX ADMIN — FUROSEMIDE 40 MG: 10 INJECTION, SOLUTION INTRAMUSCULAR; INTRAVENOUS at 20:43

## 2024-05-18 RX ADMIN — CEFTRIAXONE 2 G: 2 INJECTION, POWDER, FOR SOLUTION INTRAMUSCULAR; INTRAVENOUS at 01:13

## 2024-05-18 RX ADMIN — TACROLIMUS 1 MG: 1 CAPSULE ORAL at 08:57

## 2024-05-18 RX ADMIN — Medication 2.5 MG/HR: at 11:23

## 2024-05-18 RX ADMIN — ACETAMINOPHEN 650 MG: 325 TABLET, FILM COATED ORAL at 08:57

## 2024-05-18 RX ADMIN — TACROLIMUS 1 MG: 1 CAPSULE ORAL at 20:29

## 2024-05-18 RX ADMIN — Medication 10 ML: at 20:43

## 2024-05-18 RX ADMIN — FUROSEMIDE 80 MG: 10 INJECTION, SOLUTION INTRAMUSCULAR; INTRAVENOUS at 01:14

## 2024-05-18 RX ADMIN — DULOXETINE HYDROCHLORIDE 40 MG: 20 CAPSULE, DELAYED RELEASE ORAL at 08:57

## 2024-05-18 RX ADMIN — DIAZEPAM 5 MG: 5 TABLET ORAL at 22:25

## 2024-05-18 RX ADMIN — PREDNISONE 5 MG: 5 TABLET ORAL at 14:59

## 2024-05-18 RX ADMIN — ACETAMINOPHEN 650 MG: 325 TABLET, FILM COATED ORAL at 22:25

## 2024-05-18 RX ADMIN — GUAIFENESIN 1200 MG: 600 TABLET, EXTENDED RELEASE ORAL at 20:28

## 2024-05-18 RX ADMIN — DIAZEPAM 5 MG: 5 TABLET ORAL at 08:57

## 2024-05-18 RX ADMIN — APIXABAN 5 MG: 5 TABLET, FILM COATED ORAL at 20:29

## 2024-05-18 RX ADMIN — Medication 10 ML: at 08:58

## 2024-05-18 RX ADMIN — APIXABAN 5 MG: 5 TABLET, FILM COATED ORAL at 08:57

## 2024-05-18 RX ADMIN — LEVOTHYROXINE SODIUM 50 MCG: 0.05 TABLET ORAL at 08:57

## 2024-05-18 NOTE — H&P
Patient Care Team:  Jonathan Luna APRN as PCP - General (Family Medicine)  Jak Gavin MD as Cardiologist (Cardiology)    Chief complaint SOB    Subjective       79-year-old  female with a history of COPD who was brought to the emergency room by EMS from home with complaints of shortness of breath. Patient states that she fell at home 2 nights ago where she remained on the ground until yesterday morning when her son found her. She has had persistent shortness of breath since then. Patient states that she wears 4 L of oxygen by nasal cannula all the time. She has had no significant cough, fever, chest pain or dizziness. She does not note any swelling in her ankles. She has had no recent sick contacts or recent diagnosis of COVID or influenza. She has had no recent changes in medications. She reports allergies to zolpidem. She denies use of drugs, alcohol tobacco. In the ER, potassium 6, UA with evidence of infection, trop 29, , hgb 11.4, plt 125, BNP 8,040.  Normal lactate. CXR Small bilateral pleural effusions with vascular congestion. CT head normal. CT cervical spine DDD.  EKG with afib rate of 108. She received IV lasix and abx    Review of Systems   Constitutional:  Positive for activity change, appetite change and fatigue.   HENT:  Positive for congestion.    Respiratory:  Positive for cough, chest tightness and shortness of breath.    Cardiovascular:  Positive for palpitations and leg swelling. Negative for chest pain.   Gastrointestinal: Negative.    Musculoskeletal:  Positive for arthralgias and back pain.   Neurological:  Positive for weakness.          History  Past Medical History:   Diagnosis Date    Cancer     lung    COPD (chronic obstructive pulmonary disease)     History of appendectomy     Hypertension     Renal disease     Hartman syndrome      Past Surgical History:   Procedure Laterality Date    BREAST SURGERY Left     cysts rmeoved    CARDIAC CATHETERIZATION Right 8/12/2022     Procedure: Left Heart Cath and coronary angiogram;  Surgeon: Jak Gavin MD;  Location: [facility] CATH INVASIVE LOCATION;  Service: Cardiology;  Laterality: Right;    CLOSED REDUCTION WRIST FRACTURE Right 3/1/2022    Procedure: WRIST CLOSED REDUCTION;  Surgeon: Gaudencio Townsend MD;  Location: [facility] MAIN OR;  Service: Orthopedics;  Laterality: Right;    CYSTOSCOPY      HYSTERECTOMY      LUNG LOBECTOMY Right     TRANSPLANTATION RENAL       Family History   Problem Relation Age of Onset    No Known Problems Mother     No Known Problems Father      Social History     Tobacco Use    Smoking status: Former    Smokeless tobacco: Never   Vaping Use    Vaping status: Former   Substance Use Topics    Alcohol use: Never    Drug use: Never     Medications Prior to Admission   Medication Sig Dispense Refill Last Dose    acetaminophen (Tylenol) 325 MG tablet Take 650 mg by mouth Every 4 (Four) Hours As Needed for Fever or Mild Pain .       albuterol sulfate  (90 Base) MCG/ACT inhaler Inhale 2 puffs Every 6 (Six) Hours As Needed for Wheezing.       apixaban (ELIQUIS) 5 MG tablet tablet Take 1 tablet by mouth Every 12 (Twelve) Hours. 60 tablet 0     azaTHIOprine (IMURAN) 50 MG tablet Take 50 mg by mouth 2 (Two) Times a Day.       Carboxymethylcellulose Sodium (DRY EYE RELIEF OP) Apply 2 drops to eye(s) as directed by provider 2 (Two) Times a Day As Needed (dry eyes).       diazePAM (VALIUM) 5 MG tablet Take 5 mg by mouth 2 (Two) Times a Day As Needed for Anxiety.       Diclofenac Sodium (Aspercreme Arthritis Pain) 1 % gel gel Apply 4 g topically to the appropriate area as directed 2 (Two) Times a Day As Needed (pain).       DULoxetine (CYMBALTA) 20 MG capsule Take 40 mg by mouth Daily.       furosemide (LASIX) 40 MG tablet Take 40 mg by mouth 2 (Two) Times a Day.       guaiFENesin (Mucinex) 600 MG 12 hr tablet Take 1,200 mg by mouth 2 (Two) Times a Day.       HYDROcodone-acetaminophen (NORCO) 7.5-325 MG per tablet Take 1 tablet  by mouth 4 (Four) Times a Day As Needed for Severe Pain .       levothyroxine (SYNTHROID, LEVOTHROID) 50 MCG tablet Take 50 mcg by mouth See Admin Instructions. Monday through Friday       levothyroxine (SYNTHROID, LEVOTHROID) 50 MCG tablet Take 100 mcg by mouth 2 (Two) Times a Week. Saturday and Sunday       loperamide (IMODIUM) 2 MG capsule Take 2 mg by mouth 4 (Four) Times a Day As Needed for Diarrhea.       metoprolol succinate XL (TOPROL-XL) 50 MG 24 hr tablet Take 50 mg by mouth Daily.       Waztw-Cgrin-Jvhqukb-Pramoxine (Neosporin + Pain Relief Max St) 1 % ointment Apply 1 application topically to the appropriate area as directed 2 (Two) Times a Day As Needed (sores).       O2 (OXYGEN) Inhale 2 L/min Continuous.       potassium chloride (K-DUR,KLOR-CON) 20 MEQ CR tablet Take 20 mEq by mouth 2 (Two) Times a Day.       predniSONE (DELTASONE) 5 MG tablet Take 5 mg by mouth Daily.       Salicylic Acid-Urea (KERASAL EX) Apply 1 application topically to the appropriate area as directed 2 (Two) Times a Day As Needed (dry feet).       simvastatin (ZOCOR) 20 MG tablet Take 20 mg by mouth Every Night.       tacrolimus (PROGRAF) 1 MG capsule Take 1 mg by mouth 2 (Two) Times a Day.       Tolnaftate 1 % gel Apply 1 application topically to the appropriate area as directed 2 (Two) Times a Day As Needed (ingrown toenail).        Allergies:  Zolpidem    Objective     Vital Signs  Temp:  [98.1 °F (36.7 °C)-98.6 °F (37 °C)] 98.1 °F (36.7 °C)  Heart Rate:  [] 124  Resp:  [20-32] 20  BP: (115-186)/(72-93) 132/92     Physical Exam:      General Appearance:    Alert, cooperative, in no acute distress, ill but not toxic   Head:    Normocephalic, without obvious abnormality, atraumatic   Eyes:            Lids and lashes normal, conjunctivae and sclerae normal, no   icterus, no pallor, corneas clear, PERRLA   Ears:    Ears appear intact with no abnormalities noted   Throat:   No oral lesions, no thrush, oral mucosa moist    Neck:   No adenopathy, supple, trachea midline, no thyromegaly, no   carotid bruit, no JVD   Lungs:     crackles    Heart:   Irreg irreg, tachy, normal S1 and S2, no            murmur, no gallop, no rub, no click   Chest Wall:    No abnormalities observed   Abdomen:     Normal bowel sounds, no masses, no organomegaly, soft        non-tender, non-distended, no guarding, no rebound                tenderness   Extremities:   Moves all extremities well, 2+ edema, no cyanosis, no             redness   Pulses:   Pulses palpable and equal bilaterally   Skin:   No bleeding, bruising or rash   Lymph nodes:   No palpable adenopathy   Neurologic:   Cranial nerves 2 - 12 grossly intact, sensation intact, DTR       present and equal bilaterally       Results Review:     Imaging Results (Last 24 Hours)       Procedure Component Value Units Date/Time    XR Chest 1 View [[id number]] Collected: 05/17/24 2249     Updated: 05/17/24 2252    Narrative:      XR CHEST 1 VW    Date of Exam: 5/17/2024 10:38 PM EDT    Indication: fall    Comparison: 8/15/2022    Findings: Small bilateral pleural effusions with cardiomegaly and vascular congestion. No pneumothorax. Calcification of the aortic arch. The clavicles are intact. No rib fractures. The visualized upper abdomen is normal.      Impression:      Small bilateral pleural effusions with vascular congestion.      Electronically Signed: Adalid Silverio MD    5/17/2024 10:50 PM EDT    Workstation ID: [id number]    CT Head Without Contrast [[id number]] Collected: 05/17/24 2241     Updated: 05/17/24 2247    Narrative:      CT HEAD WO CONTRAST    Date of Exam: 5/17/2024 10:27 PM EDT    Indication: fall. Headache    Comparison: None available.    Technique: Axial CT images were obtained of the head without contrast administration.  Coronal reconstructions were performed.  Automated exposure control and iterative reconstruction methods were used.        FINDINGS:    Brain/Ventricles: Mild diffuse  atrophy is noted not unexpected for age. White matter changes compatible small vessel ischemic disease are noted in this age group. No acute intracranial hemorrhage or mass effect.    Orbits: The visualized portion of the orbits demonstrate no acute abnormality.    Sinuses:The visualized paranasal sinuses and mastoid air cells demonstrate no acute abnormality.    Soft Tissues/Skull: No acute abnormality of the visualized skull or soft tissues.      Impression:      No acute intracranial abnormality.        Electronically Signed: Davey Wells MD    5/17/2024 10:45 PM EDT    Workstation ID: [id number]    CT Cervical Spine Without Contrast [[id number]] Collected: 05/17/24 2242     Updated: 05/17/24 2246    Narrative:        CT CERVICAL SPINE WO CONTRAST    Date of Exam: 5/17/2024 10:27 PM EDT    Indication: fall, head injury.    Comparison: None available.    Technique: Axial CT images were obtained of the cervical spine without contrast administration.  Sagittal and coronal reconstructions were performed.  Automated exposure control and iterative reconstruction methods were used.      Findings:  Exam is somewhat limited by motion artifact.    There is no evidence of fracture. The craniocervical junction is intact. There are moderate atlantoaxial joint degenerative changes.  Scattered endplate osteophyte formations with multilevel facet arthropathy. Degenerative disc disease with disc height   loss at multiple levels. There is at least mild canal narrowing at C5-6 and C6-7. Varying multilevel neural foraminal narrowing, at least moderate bilaterally at C5-6 and C6-7. The paraspinal soft tissues show no acute abnormality.      Impression:      Impression:  No evidence of fracture. Degenerative changes of the cervical spine, greatest at C5-6 and C6-7 as above.      Electronically Signed: Spencer No MD    5/17/2024 10:44 PM EDT    Workstation ID: [id number]             Lab Results (last 24 hours)       Procedure  Component Value Units Date/Time    Comprehensive Metabolic Panel [[id number]]  (Abnormal) Collected: 05/18/24 0509    Specimen: Blood from Arm, Left Updated: 05/18/24 0535     Glucose 116 mg/dL      BUN 22 mg/dL      Creatinine 0.86 mg/dL      Sodium 141 mmol/L      Potassium 5.1 mmol/L      Comment: Slight hemolysis detected by analyzer. Result may be falsely elevated.        Chloride 105 mmol/L      CO2 25.0 mmol/L      Calcium 9.8 mg/dL      Total Protein 6.2 g/dL      Albumin 3.9 g/dL      ALT (SGPT) 10 U/L      AST (SGOT) 22 U/L      Alkaline Phosphatase 58 U/L      Total Bilirubin 1.4 mg/dL      Globulin 2.3 gm/dL      A/G Ratio 1.7 g/dL      BUN/Creatinine Ratio 25.6     Anion Gap 11.0 mmol/L      eGFR 69.7 mL/min/1.73     Narrative:      GFR Normal >60  Chronic Kidney Disease <60  Kidney Failure <15    The GFR formula is only valid for adults with stable renal function between ages 18 and 70.    CBC & Differential [[id number]]  (Abnormal) Collected: 05/18/24 0509    Specimen: Blood from Arm, Left Updated: 05/18/24 0516    Narrative:      The following orders were created for panel order CBC & Differential.  Procedure                               Abnormality         Status                     ---------                               -----------         ------                     CBC Auto Differential[[id number]]        Abnormal            Final result                 Please view results for these tests on the individual orders.    CBC Auto Differential [[id number]]  (Abnormal) Collected: 05/18/24 0509    Specimen: Blood from Arm, Left Updated: 05/18/24 0516     WBC 9.01 10*3/mm3      RBC 3.62 10*6/mm3      Hemoglobin 10.4 g/dL      Hematocrit 35.3 %      MCV 97.5 fL      MCH 28.7 pg      MCHC 29.5 g/dL      RDW 17.2 %      RDW-SD 61.7 fl      MPV 9.8 fL      Platelets 116 10*3/mm3      Neutrophil % 85.0 %      Lymphocyte % 6.1 %      Monocyte % 8.0 %      Eosinophil % 0.1 %      Basophil % 0.2 %      Immature  Grans % 0.6 %      Neutrophils, Absolute 7.66 10*3/mm3      Lymphocytes, Absolute 0.55 10*3/mm3      Monocytes, Absolute 0.72 10*3/mm3      Eosinophils, Absolute 0.01 10*3/mm3      Basophils, Absolute 0.02 10*3/mm3      Immature Grans, Absolute 0.05 10*3/mm3      nRBC 0.0 /100 WBC     TSH [[id number]]  (Normal) Collected: 05/18/24 0057    Specimen: Blood from Arm, Left Updated: 05/18/24 0435     TSH 0.645 uIU/mL     High Sensitivity Troponin T 2Hr [[id number]]  (Abnormal) Collected: 05/18/24 0057    Specimen: Blood from Arm, Left Updated: 05/18/24 0125     HS Troponin T 30 ng/L      Troponin T Delta 1 ng/L     Narrative:      High Sensitive Troponin T Reference Range:  <14.0 ng/L- Negative Female for AMI  <22.0 ng/L- Negative Male for AMI  >=14 - Abnormal Female indicating possible myocardial injury.  >=22 - Abnormal Male indicating possible myocardial injury.   Clinicians would have to utilize clinical acumen, EKG, Troponin, and serial changes to determine if it is an Acute Myocardial Infarction or myocardial injury due to an underlying chronic condition.         Blood Culture - Blood, Arm, Left [[id number]] Collected: 05/18/24 0057    Specimen: Blood from Arm, Left Updated: 05/18/24 0103    Blood Culture - Blood, Arm, Left [[id number]] Collected: 05/18/24 0057    Specimen: Blood from Arm, Left Updated: 05/18/24 0101    POC Lactate [[id number]]  (Normal) Collected: 05/18/24 0050    Specimen: Blood Updated: 05/18/24 0053     Lactate 1.9 mmol/L      Comment: Serial Number: [id number]:  [id number]       BNP [[id number]]  (Abnormal) Collected: 05/17/24 2220    Specimen: Blood Updated: 05/17/24 2328     proBNP 8,040.0 pg/mL     Narrative:      This assay is used as an aid in the diagnosis of individuals suspected of having heart failure. It can be used as an aid in the diagnosis of acute decompensated heart failure (ADHF) in patients presenting with signs and symptoms of ADHF to the emergency department (ED). In  addition, NT-proBNP of <300 pg/mL indicates ADHF is not likely.    Age Range Result Interpretation  NT-proBNP Concentration (pg/mL:      <50             Positive            >450                   Gray                 300-450                    Negative             <300    50-75           Positive            >900                  Gray                300-900                  Negative            <300      >75             Positive            >1800                  Gray                300-1800                  Negative            <300    Urine Culture - Urine, Straight Cath [[id number]] Collected: 05/17/24 2245    Specimen: Urine from Straight Cath Updated: 05/17/24 2325    Respiratory Panel PCR w/COVID-19(SARS-CoV-2) HODAN/MARIA M/TWILA/PAD/COR/JENISE In-House, NP Swab in UTM/VTM, 2 HR TAT - Swab, Nasopharynx [[id number]]  (Normal) Collected: 05/17/24 2216    Specimen: Swab from Nasopharynx Updated: 05/17/24 2312     ADENOVIRUS, PCR Not Detected     Coronavirus 229E Not Detected     Coronavirus HKU1 Not Detected     Coronavirus NL63 Not Detected     Coronavirus OC43 Not Detected     COVID19 Not Detected     Human Metapneumovirus Not Detected     Human Rhinovirus/Enterovirus Not Detected     Influenza A PCR Not Detected     Influenza B PCR Not Detected     Parainfluenza Virus 1 Not Detected     Parainfluenza Virus 2 Not Detected     Parainfluenza Virus 3 Not Detected     Parainfluenza Virus 4 Not Detected     RSV, PCR Not Detected     Bordetella pertussis pcr Not Detected     Bordetella parapertussis PCR Not Detected     Chlamydophila pneumoniae PCR Not Detected     Mycoplasma pneumo by PCR Not Detected    Narrative:      In the setting of a positive respiratory panel with a viral infection PLUS a negative procalcitonin without other underlying concern for bacterial infection, consider observing off antibiotics or discontinuation of antibiotics and continue supportive care. If the respiratory panel is positive for atypical bacterial  infection (Bordetella pertussis, Chlamydophila pneumoniae, or Mycoplasma pneumoniae), consider antibiotic de-escalation to target atypical bacterial infection.    Blood Gas, Arterial - [[id number]]  (Abnormal) Collected: 05/17/24 2309    Specimen: Arterial Blood Updated: 05/17/24 2312     Site Left Radial     Orion's Test Positive     pH, Arterial 7.321 pH units      pCO2, Arterial 41.2 mm Hg      pO2, Arterial 105.4 mm Hg      HCO3, Arterial 21.3 mmol/L      Base Excess, Arterial -4.6 mmol/L      Comment: Serial Number: [id number]:  [id number]        O2 Saturation, Arterial 97.6 %      CO2 Content 22.5 mmol/L      Barometric Pressure for Blood Gas --     Comment: N/A        Modality Cannula     FIO2 44 %      Hemodilution No    Urinalysis, Microscopic Only - Straight Cath [[id number]]  (Abnormal) Collected: 05/17/24 2245    Specimen: Urine from Straight Cath Updated: 05/17/24 2303     RBC, UA Too Numerous to Count /HPF      WBC, UA Too Numerous to Count /HPF      Bacteria, UA 4+ /HPF      Squamous Epithelial Cells, UA 3-6 /HPF      Hyaline Casts, UA 0-2 /LPF      Granular Casts, UA 0-2 /LPF      Calcium Oxalate Crystals, UA Small/1+ /HPF      Methodology Manual Light Microscopy    Urinalysis With Microscopic If Indicated (No Culture) - Straight Cath [[id number]]  (Abnormal) Collected: 05/17/24 2245    Specimen: Urine from Straight Cath Updated: 05/17/24 2251     Color, UA Dark Yellow     Appearance, UA Cloudy     pH, UA <=5.0     Specific Gravity, UA 1.025     Glucose, UA Negative     Ketones, UA Trace     Bilirubin, UA Small (1+)     Comment: Confirmation testing is unavailable.  A serum bilirubin is recommended for further assessment.        Blood, UA Small (1+)     Protein,  mg/dL (2+)     Leuk Esterase, UA Small (1+)     Nitrite, UA Positive     Urobilinogen, UA 1.0 E.U./dL    Comprehensive Metabolic Panel [[id number]]  (Abnormal) Collected: 05/17/24 2220    Specimen: Blood Updated: 05/17/24 2250     Glucose  145 mg/dL      BUN 22 mg/dL      Creatinine 0.97 mg/dL      Sodium 141 mmol/L      Potassium 6.0 mmol/L      Comment: Slight hemolysis detected by analyzer. Result may be falsely elevated.        Chloride 104 mmol/L      CO2 21.0 mmol/L      Calcium 10.1 mg/dL      Total Protein 6.4 g/dL      Albumin 4.0 g/dL      ALT (SGPT) 12 U/L      AST (SGOT) 24 U/L      Alkaline Phosphatase 65 U/L      Total Bilirubin 2.5 mg/dL      Globulin 2.4 gm/dL      A/G Ratio 1.7 g/dL      BUN/Creatinine Ratio 22.7     Anion Gap 16.0 mmol/L      eGFR 60.3 mL/min/1.73     Narrative:      GFR Normal >60  Chronic Kidney Disease <60  Kidney Failure <15    The GFR formula is only valid for adults with stable renal function between ages 18 and 70.    High Sensitivity Troponin T [[id number]]  (Abnormal) Collected: 05/17/24 2220    Specimen: Blood Updated: 05/17/24 2250     HS Troponin T 29 ng/L     Narrative:      High Sensitive Troponin T Reference Range:  <14.0 ng/L- Negative Female for AMI  <22.0 ng/L- Negative Male for AMI  >=14 - Abnormal Female indicating possible myocardial injury.  >=22 - Abnormal Male indicating possible myocardial injury.   Clinicians would have to utilize clinical acumen, EKG, Troponin, and serial changes to determine if it is an Acute Myocardial Infarction or myocardial injury due to an underlying chronic condition.         CK [[id number]]  (Abnormal) Collected: 05/17/24 2220    Specimen: Blood Updated: 05/17/24 2250     Creatine Kinase 208 U/L     CBC & Differential [[id number]]  (Abnormal) Collected: 05/17/24 2220    Specimen: Blood Updated: 05/17/24 2225    Narrative:      The following orders were created for panel order CBC & Differential.  Procedure                               Abnormality         Status                     ---------                               -----------         ------                     CBC Auto Differential[[id number]]        Abnormal            Final result                 Please view  results for these tests on the individual orders.    CBC Auto Differential [[id number]]  (Abnormal) Collected: 05/17/24 2220    Specimen: Blood Updated: 05/17/24 2225     WBC 9.75 10*3/mm3      RBC 3.91 10*6/mm3      Hemoglobin 11.4 g/dL      Hematocrit 38.8 %      MCV 99.2 fL      MCH 29.2 pg      MCHC 29.4 g/dL      RDW 17.1 %      RDW-SD 62.3 fl      MPV 9.7 fL      Platelets 125 10*3/mm3      Neutrophil % 88.0 %      Lymphocyte % 2.7 %      Monocyte % 8.3 %      Eosinophil % 0.0 %      Basophil % 0.3 %      Immature Grans % 0.7 %      Neutrophils, Absolute 8.58 10*3/mm3      Lymphocytes, Absolute 0.26 10*3/mm3      Monocytes, Absolute 0.81 10*3/mm3      Eosinophils, Absolute 0.00 10*3/mm3      Basophils, Absolute 0.03 10*3/mm3      Immature Grans, Absolute 0.07 10*3/mm3      nRBC 0.0 /100 WBC              I reviewed the patient's new clinical results.    Assessment & Plan       Acute UTI  - admit  - IV rocephin  - blood cultures and urine cultures collected    Afib with RVR  - cardizem gtt  - cardiology consult  - eliquis    Volume overload  - IV lasix  - echo    S/p fall  -   - CT head and cervical spine unremarkable    COPD  HTN  H/o lung cancer  H/o kidney transplant - consult nephrology.  Creat 0.86.  continue home meds  Weakness - PT/OT  Anemia - check iron profile  HLD - hold statin  hypothyroid    DVT prophy - eliquis  Stress ulcer prophy - PPI    I discussed the patients findings and my recommendations with patient.     Nilsa Lomeli MD  05/18/24  08:58 EDT

## 2024-05-18 NOTE — CONSULTS
Cardiology Consult Note    Patient Identification:  Name: Jaja Carcamo  Age: 79 y.o.  Sex: female  :  1945  MRN: [id number]             Requesting Physician :  Nilsa Lomeli M     Reason for Consultation / Chief Complaint :   A-fib with RVR    History of Present Illness:      Ms. Jaja Carcamo has PMH of    CAD, cardiac cath 2022 30% LAD 50% RCA  Lung cancer  COPD  Hypertension  ESRD, granulomatous polyangiitis, HD,  donor kidney transplant   Dyslipidemia  Hypothyroidism  Hysterectomy, lung lobectomy, renal transplantation  Former smoker  Allergies/intolerance to zolpidem    Presented through emergency room from 2024 with complaint of shortness of breath.  Patient reportedly fell last night and remained on the ground till morning when son found her and called EMS.  Patient is on chronic home O2 at 4 L.  Patient was found to be in A-fib with RVR and was started on IV Cardizem.  Cardiology was consulted.  Patient states that lung cancer was back and she is seeing thoracic surgeon in August and nothing has been done since then.  Details about her recurrent lung cancer schedule    Data:  Labs  and 2024 reveal HS troponin of 29 and 30, proBNP 8040.  TSH 0.645.  Hemoglobin is 10.  Chest x-ray was pulmonary effusion and pulmonary congestion.  EKG done 2024 reviewed/interpreted by me reveals A-fib with a rate of 108 bpm with PVCs.      Assessment:  :    A-fib with RVR  [id number] SCORE   [id number] Score: 5 (2024  3:54 PM)     (Due to age greater than 75, female gender, hypertension, vascular disease)    Status post fall  History of lung cancer, COPD  Hypertension  CKD history of kidney transplant  Anemia  Dyslipidemia        Recommendations / Plan:        Patient is currently admitted to [facility]  Will monitor telemetry closely  Give IV Cardizem and monitor closely by monitoring hemodynamics, rhythm, labs.  Defer to pulmonary to evaluate and treat her lung disease  including what patient is saying about recurrence of lung cancer  Will check an echocardiogram.  Patient states that her lung cancer has reoccurred but is a poor historian.  Patient has a difficult time understanding disease process and therapy is offered.  When I was talking to her about intravenous Cardizem drip patient patient was thinking it is chemotherapy and she is to come on multiple sessions and was discussing about transportation with her son etc. spent a long time explaining to patient about atrial fibrillation and rate control medication and need for anticoagulation.  Will follow-up and consider further evaluation treatment.             Diagnosis Plan   1. Acute UTI        2. Generalized weakness        3. Fall, initial encounter        4. Hypervolemia, unspecified hypervolemia type        5. Leg swelling                   Past Medical History:  Past Medical History:   Diagnosis Date    Cancer     lung    COPD (chronic obstructive pulmonary disease)     History of appendectomy     Hypertension     Renal disease     Hartman syndrome      Past Surgical History:  Past Surgical History:   Procedure Laterality Date    BREAST SURGERY Left     cysts rmeoved    CARDIAC CATHETERIZATION Right 8/12/2022    Procedure: Left Heart Cath and coronary angiogram;  Surgeon: Jak Gavin MD;  Location: [facility] CATH INVASIVE LOCATION;  Service: Cardiology;  Laterality: Right;    CLOSED REDUCTION WRIST FRACTURE Right 3/1/2022    Procedure: WRIST CLOSED REDUCTION;  Surgeon: Gaudencio Townsend MD;  Location: [facility] MAIN OR;  Service: Orthopedics;  Laterality: Right;    CYSTOSCOPY      HYSTERECTOMY      LUNG LOBECTOMY Right     TRANSPLANTATION RENAL        Allergies:  Allergies   Allergen Reactions    Zolpidem Other (See Comments), Unknown (See Comments) and Unknown - High Severity     KEPT HER AWAKE  KEPT HER AWAKE  KEPT HER AWAKE  KEPT HER AWAKE       Home Meds:  Medications Prior to Admission   Medication Sig Dispense Refill Last  Dose    acetaminophen (Tylenol) 325 MG tablet Take 2 tablets by mouth Every 4 (Four) Hours As Needed for Fever or Mild Pain.       apixaban (ELIQUIS) 5 MG tablet tablet Take 1 tablet by mouth Every 12 (Twelve) Hours. 60 tablet 0     albuterol sulfate  (90 Base) MCG/ACT inhaler Inhale 2 puffs Every 6 (Six) Hours As Needed for Wheezing.       Carboxymethylcellulose Sodium (DRY EYE RELIEF OP) Apply 2 drops to eye(s) as directed by provider 2 (Two) Times a Day As Needed (dry eyes).       colestipol (COLESTID) 1 g tablet Take 1 tablet by mouth Daily.       diazePAM (VALIUM) 5 MG tablet Take 5 mg by mouth 2 (Two) Times a Day As Needed for Anxiety.       Diclofenac Sodium (Aspercreme Arthritis Pain) 1 % gel gel Apply 4 g topically to the appropriate area as directed 2 (Two) Times a Day As Needed (pain).       DULoxetine HCl 40 MG capsule delayed-release particles Take 1 capsule by mouth Daily.       furosemide (LASIX) 40 MG tablet Take 40 mg by mouth 2 (Two) Times a Day.       guaiFENesin (Mucinex) 600 MG 12 hr tablet Take 1,200 mg by mouth 2 (Two) Times a Day.       levothyroxine (SYNTHROID, LEVOTHROID) 50 MCG tablet Take 1 tablet by mouth See Admin Instructions.       loperamide (IMODIUM) 2 MG capsule Take 2 mg by mouth 4 (Four) Times a Day As Needed for Diarrhea.       metoprolol succinate XL (TOPROL-XL) 50 MG 24 hr tablet Take 50 mg by mouth Daily.       Qdgbj-Nlzeo-Bxobscz-Pramoxine (Neosporin + Pain Relief Max St) 1 % ointment Apply 1 application topically to the appropriate area as directed 2 (Two) Times a Day As Needed (sores).       O2 (OXYGEN) Inhale 2 L/min Continuous.       potassium chloride (K-DUR,KLOR-CON) 20 MEQ CR tablet Take 1 tablet by mouth Daily.       predniSONE (DELTASONE) 5 MG tablet Take 5 mg by mouth Daily.       Salicylic Acid-Urea (KERASAL EX) Apply 1 application topically to the appropriate area as directed 2 (Two) Times a Day As Needed (dry feet).       simvastatin (ZOCOR) 20 MG tablet  Take 20 mg by mouth Every Night.       tacrolimus (PROGRAF) 1 MG capsule Take 1 mg by mouth 2 (Two) Times a Day.       Tolnaftate 1 % gel Apply 1 application topically to the appropriate area as directed 2 (Two) Times a Day As Needed (ingrown toenail).        Current Meds:     Current Facility-Administered Medications:     acetaminophen (TYLENOL) tablet 650 mg, 650 mg, Oral, Q4H PRN, Anna Wang APRN, 650 mg at 05/18/24 0857    albuterol (PROVENTIL) nebulizer solution 0.083% 2.5 mg/3mL, 2.5 mg, Nebulization, Q6H PRN, Anna Wang APRN    apixaban (ELIQUIS) tablet 5 mg, 5 mg, Oral, Q12H, Anna Wang APRN, 5 mg at 05/18/24 0857    sennosides-docusate (PERICOLACE) 8.6-50 MG per tablet 2 tablet, 2 tablet, Oral, BID PRN **AND** polyethylene glycol (MIRALAX) packet 17 g, 17 g, Oral, Daily PRN **AND** bisacodyl (DULCOLAX) EC tablet 5 mg, 5 mg, Oral, Daily PRN **AND** bisacodyl (DULCOLAX) suppository 10 mg, 10 mg, Rectal, Daily PRN, Anna Wang, APRN    Calcium Replacement - Follow Nurse / BPA Driven Protocol, , Does not apply, PRN, Anna Wang APRN    carboxymethylcellulose sod 1 % eye gel 2 drop, 2 drop, Both Eyes, BID PRN, Anna Wang APRN    [START ON 5/19/2024] cefTRIAXone (ROCEPHIN) 1,000 mg in sodium chloride 0.9 % 100 mL MBP, 1,000 mg, Intravenous, Q24H, Nilsa Lomeli MD    diazePAM (VALIUM) tablet 5 mg, 5 mg, Oral, BID PRN, Anna Wang, APRN, 5 mg at 05/18/24 0857    Diclofenac Sodium (VOLTAREN) 1 % gel 4 g, 4 g, Topical, BID PRN, Anna Wang APRN    dilTIAZem (CARDIZEM) 125 mg in 125 mL D5W infusion, 5-15 mg/hr, Intravenous, Titrated, Nilsa Lomeli MD, Last Rate: 5 mL/hr at 05/18/24 1508, 5 mg/hr at 05/18/24 1508    DULoxetine (CYMBALTA) DR capsule 40 mg, 40 mg, Oral, Daily, Anna Wang, APRN, 40 mg at 05/18/24 0857    furosemide (LASIX) injection 40 mg, 40 mg, Intravenous, BID, Konrad Osorio MD    guaiFENesin (MUCINEX) 12 hr tablet 1,200 mg, 1,200  mg, Oral, BID, Anna Wang APRN    levothyroxine (SYNTHROID, LEVOTHROID) tablet 50 mcg, 50 mcg, Oral, Q AM, Anna Wang APRN, 50 mcg at 05/18/24 0857    loperamide (IMODIUM) capsule 2 mg, 2 mg, Oral, 4x Daily PRN, Anna Wang APRN    Magnesium Standard Dose Replacement - Follow Nurse / BPA Driven Protocol, , Does not apply, PRN, Anna Wang APRN    metoprolol succinate XL (TOPROL-XL) 24 hr tablet 50 mg, 50 mg, Oral, Daily, Anna Wang APRN, 50 mg at 05/18/24 0857    nitroglycerin (NITROSTAT) SL tablet 0.4 mg, 0.4 mg, Sublingual, Q5 Min PRN, Anna Wang APRN    ondansetron (ZOFRAN) injection 4 mg, 4 mg, Intravenous, Q6H PRN, Anna Wang APRN    [START ON 5/19/2024] pantoprazole (PROTONIX) injection 40 mg, 40 mg, Intravenous, Q AM, Nilsa Lomeli MD    Phosphorus Replacement - Follow Nurse / BPA Driven Protocol, , Does not apply, PRN, Anna Wang APRN    Potassium Replacement - Follow Nurse / BPA Driven Protocol, , Does not apply, PRN, Anna Wang APRN    predniSONE (DELTASONE) tablet 5 mg, 5 mg, Oral, Daily, Anna Wang APRN, 5 mg at 05/18/24 1459    [COMPLETED] Insert Peripheral IV, , , Once **AND** sodium chloride 0.9 % flush 10 mL, 10 mL, Intravenous, PRN, Anna Wang APRN    sodium chloride 0.9 % flush 10 mL, 10 mL, Intravenous, Q12H, Anna Wang APRN, 10 mL at 05/18/24 0858    sodium chloride 0.9 % flush 10 mL, 10 mL, Intravenous, PRN, Anna Wang APRASHLEIGH    sodium chloride 0.9 % infusion 40 mL, 40 mL, Intravenous, PRN, Anna Wang APRN    tacrolimus (PROGRAF) capsule 1 mg, 1 mg, Oral, BID, Anna Wang APRN, 1 mg at 05/18/24 0857  Social History:   Social History     Tobacco Use    Smoking status: Former    Smokeless tobacco: Never   Substance Use Topics    Alcohol use: Never      Family History:  Family History   Problem Relation Age of Onset    No Known Problems Mother     No Known Problems Father      "    Review of Systems : Review of Systems   All other systems reviewed and are negative.                 Constitutional:  Temp:  [97.4 °F (36.3 °C)-98.6 °F (37 °C)] 98 °F (36.7 °C)  Heart Rate:  [] 91  Resp:  [20-32] 20  BP: ()/(52-93) 136/67    Physical Exam   /67   Pulse 91   Temp 98 °F (36.7 °C) (Oral)   Resp 20   Ht 167.6 cm (66\")   Wt 88.9 kg (195 lb 15.8 oz)   SpO2 99%   BMI 31.63 kg/m²   Physical Exam  General:  Appears chronically ill.  Eyes: Sclerae are anicteric,  conjunctivae are clear   HEENT:  No JVD. Thyroid not visibly enlarged. No mucosal pallor or cyanosis  Respiratory: Respirations regular and unlabored at rest.  Scattered rhonchi  Cardiovascular: S1,S2 irregularly irregular rate and rhythm  Gastrointestinal: Abdomen soft, flat, nontender. Bowel sounds present.   Musculoskeletal:  No abnormal movements  Extremities: No digital clubbing or cyanosis  Skin: Color pink. Skin warm and dry to touch. No rashes  No xanthoma  Neuro: Alert and awake, no lateralizing deficits appreciated    Cardiographics  ECG: EKG tracing was  personally reviewed/interpreted by me  ECG 12 Lead Dyspnea   Final Result   HEART RATE= 108  bpm   RR Interval= 556  ms   ND Interval=   ms   P Horizontal Axis=   deg   P Front Axis=   deg   QRSD Interval= 79  ms   QT Interval= 333  ms   QTcB= 447  ms   QRS Axis= -33  deg   T Wave Axis= 64  deg   - ABNORMAL ECG -   Atrial fibrillation   Ventricular premature complex   Left axis deviation   Low voltage, precordial leads   Electronically Signed By: Baldev Ramirez (Derek) 18-May-2024 06:19:24   Date and Time of Study: 2024-05-17 22:05:58          Telemetry: A-fib with RVR    Echocardiogram:   Results for orders placed during the hospital encounter of 08/11/22    Adult Transthoracic Echo Limited W/ Cont if Necessary Per Protocol    Interpretation Summary  · Calculated left ventricular EF = 55% Estimated left ventricular EF was in agreement with the calculated left " ventricular EF.  · Left ventricular diastolic function was not assessed.  · The right atrial cavity is borderline dilated.  · Moderate mitral valve regurgitation is present.      Imaging  Chest X-ray:   Imaging Results (Last 24 Hours)       Procedure Component Value Units Date/Time    XR Chest 1 View [[id number]] Collected: 05/17/24 2249     Updated: 05/17/24 2252    Narrative:      XR CHEST 1 VW    Date of Exam: 5/17/2024 10:38 PM EDT    Indication: fall    Comparison: 8/15/2022    Findings: Small bilateral pleural effusions with cardiomegaly and vascular congestion. No pneumothorax. Calcification of the aortic arch. The clavicles are intact. No rib fractures. The visualized upper abdomen is normal.      Impression:      Small bilateral pleural effusions with vascular congestion.      Electronically Signed: Adalid Silverio MD    5/17/2024 10:50 PM EDT    Workstation ID: [id number]    CT Head Without Contrast [[id number]] Collected: 05/17/24 2241     Updated: 05/17/24 2247    Narrative:      CT HEAD WO CONTRAST    Date of Exam: 5/17/2024 10:27 PM EDT    Indication: fall. Headache    Comparison: None available.    Technique: Axial CT images were obtained of the head without contrast administration.  Coronal reconstructions were performed.  Automated exposure control and iterative reconstruction methods were used.        FINDINGS:    Brain/Ventricles: Mild diffuse atrophy is noted not unexpected for age. White matter changes compatible small vessel ischemic disease are noted in this age group. No acute intracranial hemorrhage or mass effect.    Orbits: The visualized portion of the orbits demonstrate no acute abnormality.    Sinuses:The visualized paranasal sinuses and mastoid air cells demonstrate no acute abnormality.    Soft Tissues/Skull: No acute abnormality of the visualized skull or soft tissues.      Impression:      No acute intracranial abnormality.        Electronically Signed: Davey Wells MD    5/17/2024  10:45 PM EDT    Workstation ID: [id number]    CT Cervical Spine Without Contrast [[id number]] Collected: 05/17/24 2242     Updated: 05/17/24 2246    Narrative:        CT CERVICAL SPINE WO CONTRAST    Date of Exam: 5/17/2024 10:27 PM EDT    Indication: fall, head injury.    Comparison: None available.    Technique: Axial CT images were obtained of the cervical spine without contrast administration.  Sagittal and coronal reconstructions were performed.  Automated exposure control and iterative reconstruction methods were used.      Findings:  Exam is somewhat limited by motion artifact.    There is no evidence of fracture. The craniocervical junction is intact. There are moderate atlantoaxial joint degenerative changes.  Scattered endplate osteophyte formations with multilevel facet arthropathy. Degenerative disc disease with disc height   loss at multiple levels. There is at least mild canal narrowing at C5-6 and C6-7. Varying multilevel neural foraminal narrowing, at least moderate bilaterally at C5-6 and C6-7. The paraspinal soft tissues show no acute abnormality.      Impression:      Impression:  No evidence of fracture. Degenerative changes of the cervical spine, greatest at C5-6 and C6-7 as above.      Electronically Signed: Spencer No MD    5/17/2024 10:44 PM EDT    Workstation ID: [id number]            Lab Review: I have reviewed the labs  Results from last 7 days   Lab Units 05/18/24  0057 05/17/24  2220   CK TOTAL U/L  --  208*   HSTROP T ng/L 30* 29*         Results from last 7 days   Lab Units 05/18/24  0509   SODIUM mmol/L 141   POTASSIUM mmol/L 5.1   BUN mg/dL 22   CREATININE mg/dL 0.86   CALCIUM mg/dL 9.8         Results from last 7 days   Lab Units 05/17/24  2220   PROBNP pg/mL 8,040.0*     Results from last 7 days   Lab Units 05/18/24  0509 05/17/24  2220   WBC 10*3/mm3 9.01 9.75   HEMOGLOBIN g/dL 10.4* 11.4*   HEMATOCRIT % 35.3 38.8   PLATELETS 10*3/mm3 116* 125*                 Roni Cha,  MD  5/18/2024, 15:54 EDT      EMR Dragon/Transcription:   Dictated utilizing Dragon dictation

## 2024-05-18 NOTE — CONSULTS
Name: Jaja Carcamo  Age: 79 y.o.  : 1945  Sex: female    26    Reason for Consultation:  Management of patient with renal transplant    Chief Complaint:  Shortness of breath patient felt like she was smothering     History of Present Illness :  Patient is a 79-year-old female with a history of end-stage renal disease secondary to granulomatous polyangiitis.  Patient was on hemodialysis for a few years followed by a  donor kidney transplant in 2017 patient followed by Dr. Naun Falcon     Patient has been admitted complaints of shortness of breath.  Patient felt like she was smothering.  She is also had some lower extremity edema.  Patient has had a cough but no fever or any chills.    Chest x-ray shows bilateral pulmonary vascular congestion    On admission she had a BUN of 22 creatinine 0.9.  She was hyperkalemic with serum potassium of 6.0 which is now decreased to 5.1.    Patient tachycardic.  Questionable A-fib started on a Cardizem drip.    Review of Systems    Review of Systems  Positive for shortness of breath and cough  Positive for lower extremity edema    Past Medical History    Past Medical History:   Diagnosis Date    Cancer     lung    COPD (chronic obstructive pulmonary disease)     History of appendectomy     Hypertension     Renal disease     Hartman syndrome        Past Surgical History    Past Surgical History:   Procedure Laterality Date    BREAST SURGERY Left     cysts rmeoved    CARDIAC CATHETERIZATION Right 2022    Procedure: Left Heart Cath and coronary angiogram;  Surgeon: Jak Gavin MD;  Location: [facility] CATH INVASIVE LOCATION;  Service: Cardiology;  Laterality: Right;    CLOSED REDUCTION WRIST FRACTURE Right 3/1/2022    Procedure: WRIST CLOSED REDUCTION;  Surgeon: Gaudencio Townsend MD;  Location: [facility] OR;  Service: Orthopedics;  Laterality: Right;    CYSTOSCOPY      HYSTERECTOMY      LUNG LOBECTOMY Right     TRANSPLANTATION RENAL           Family  History  Family History   Problem Relation Age of Onset    No Known Problems Mother     No Known Problems Father        Objective:    Vital Signs  Temp:  [97.4 °F (36.3 °C)-98.6 °F (37 °C)] 98 °F (36.7 °C)  Heart Rate:  [] 120  Resp:  [20-32] 20  BP: (115-186)/(72-93) 128/78        Intake/Output Summary (Last 24 hours) at 5/18/2024 1351  Last data filed at 5/18/2024 1041  Gross per 24 hour   Intake 240 ml   Output 1350 ml   Net -1110 ml             Physical Exam   Physical Exam  Constitutional:       General: She is not in acute distress.     Appearance: She is well-developed. She is not diaphoretic.   HENT:      Head: Normocephalic and atraumatic.      Nose: Nose normal.   Eyes:      General:         Right eye: No discharge.         Left eye: No discharge.      Conjunctiva/sclera: Conjunctivae normal.      Pupils: Pupils are equal, round, and reactive to light.   Neck:      Thyroid: No thyromegaly.      Vascular: No JVD.      Trachea: No tracheal deviation.   Cardiovascular:      Rate and Rhythm: Normal rate and regular rhythm.      Heart sounds: Normal heart sounds. No murmur heard.     No friction rub. No gallop.   Pulmonary:      Effort: Pulmonary effort is normal. No respiratory distress.      Breath sounds: No stridor. Rales present. No wheezing.   Chest:      Chest wall: No tenderness.   Abdominal:      General: Bowel sounds are normal. There is no distension.      Palpations: Abdomen is soft. There is no mass.      Tenderness: There is no abdominal tenderness. There is no guarding or rebound.   Musculoskeletal:         General: No tenderness or deformity. Normal range of motion.      Cervical back: Normal range of motion and neck supple.   Lymphadenopathy:      Cervical: No cervical adenopathy.   Skin:     General: Skin is warm and dry.      Coloration: Skin is not pale.      Findings: No erythema or rash.   Neurological:      Mental Status: She is alert and oriented to person, place, and time.       Cranial Nerves: No cranial nerve deficit.      Motor: No abnormal muscle tone.      Coordination: Coordination normal.      Deep Tendon Reflexes: Reflexes normal.   Psychiatric:         Behavior: Behavior normal.         Thought Content: Thought content normal.         Judgment: Judgment normal.                   Results Review:      Results from last 7 days   Lab Units 24  0509 24  2220   SODIUM mmol/L 141 141   CO2 mmol/L 25.0 21.0*   BUN mg/dL 22 22   CREATININE mg/dL 0.86 0.97   CALCIUM mg/dL 9.8 10.1   ALBUMIN g/dL 3.9 4.0   AST (SGOT) U/L 22 24   ALT (SGPT) U/L 10 12   EGFR mL/min/1.73 69.7 60.3       Results from last 7 days   Lab Units 24  0509 24  2220   WBC 10*3/mm3 9.01 9.75       Pertinent Imaging studies were reviewed      Medication Review:     apixaban, 5 mg, Oral, Q12H  azaTHIOprine, 50 mg, Oral, BID  [START ON 2024] cefTRIAXone, 1,000 mg, Intravenous, Q24H  DULoxetine, 40 mg, Oral, Daily  furosemide, 40 mg, Oral, BID  guaiFENesin, 1,200 mg, Oral, BID  levothyroxine, 50 mcg, Oral, Q AM  metoprolol succinate XL, 50 mg, Oral, Daily  [START ON 2024] pantoprazole, 40 mg, Intravenous, Q AM  potassium chloride, 20 mEq, Oral, BID  predniSONE, 5 mg, Oral, Daily  sodium chloride, 10 mL, Intravenous, Q12H  tacrolimus, 1 mg, Oral, BID      dilTIAZem, 5-15 mg/hr, Last Rate: 2.5 mg/hr (24 1130)        Assessment  Patient with a history of  donor kidney transplant in 2017 with good allograft function.  Underlying chronic kidney disease in the past secondary to granulomatous polyangiitis.    Hyperkalemia  ?  Secondary to mild metabolic acidosis on admission and potassium supplementation    Abnormal urinalysis  Rule out urine tract infection    Shortness of breath  Respiratory panel pathogen was negative.  Abnormal urinalysis  COPD exacerbation versus congestive heart failure chest x-ray showing pulmonary edema    Atrial fibrillation with rapid ventricular  rate  Patient on anticoagulation with Eliquis, she is on a Cardizem drip  Coronary artery disease  Nonobstructive CAD    History of COPD    History of lung cancer.      Plan:  Patient with good allograft function.  Continue immunosuppression with prednisone and tacrolimus.    Follow-up on results of urine culture continue empiric antibiotics.  The microscopic hematuria most likely secondary to urinary tract infection.  She has had a history of granulomatous polyangiitis in the past.  The patient rules out for urinary tract infection may be worthwhile to check ANCA levels.    Start intravenous Lasix for volume overload.    Monitor renal function electrolytes closely.    Further recommendations based on hospital course.    Konrad Osorio MD  05/18/24  13:51 EDT  Tel: [phone]  Fax: [phone]

## 2024-05-18 NOTE — PLAN OF CARE
Goal Outcome Evaluation:  Plan of Care Reviewed With: patient        Progress: no change  Outcome Evaluation: Patient admitted to floor, oriented to room. VSS, no concerns at this time.

## 2024-05-18 NOTE — ED PROVIDER NOTES
Subjective   History of Present Illness  Patient is a pleasant 79-year-old  female with a history of COPD who was brought to the emergency room by EMS from home with complaints of shortness of breath.  Patient states that she fell at home last night where she remained on the ground until this morning when her son found her.  She has had persistent shortness of breath since then.  Patient states that she wears 4 L of oxygen by nasal cannula all the time.  She has had no significant cough, fever, chest pain or dizziness.  She does not note any swelling in her ankles.  She has had no recent sick contacts or recent diagnosis of COVID or influenza.  She has had no recent changes in medications.  She reports allergies to zolpidem.  She denies use of drugs, alcohol tobacco.    PCP: MD Aj      Review of Systems   Constitutional:  Negative for appetite change and fever.   HENT:  Negative for congestion and rhinorrhea.    Respiratory:  Positive for shortness of breath. Negative for cough.    Cardiovascular:  Positive for leg swelling. Negative for chest pain.   Gastrointestinal:  Negative for abdominal pain and nausea.   Genitourinary:  Negative for dysuria and urgency.   Neurological:  Positive for weakness. Negative for syncope and headaches.   All other systems reviewed and are negative.      Past Medical History:   Diagnosis Date    Cancer     lung    COPD (chronic obstructive pulmonary disease)     History of appendectomy     Hypertension     Renal disease     Hartman syndrome        Allergies   Allergen Reactions    Zolpidem Other (See Comments), Unknown (See Comments) and Unknown - High Severity     KEPT HER AWAKE  KEPT HER AWAKE  KEPT HER AWAKE  KEPT HER AWAKE         Past Surgical History:   Procedure Laterality Date    BREAST SURGERY Left     cysts rmeoved    CARDIAC CATHETERIZATION Right 8/12/2022    Procedure: Left Heart Cath and coronary angiogram;  Surgeon: Jak Gavin MD;  Location: [facility]  "CATH INVASIVE LOCATION;  Service: Cardiology;  Laterality: Right;    CLOSED REDUCTION WRIST FRACTURE Right 3/1/2022    Procedure: WRIST CLOSED REDUCTION;  Surgeon: Gaudencio Townsend MD;  Location: [facility] MAIN OR;  Service: Orthopedics;  Laterality: Right;    CYSTOSCOPY      HYSTERECTOMY      LUNG LOBECTOMY Right     TRANSPLANTATION RENAL         Family History   Problem Relation Age of Onset    No Known Problems Mother     No Known Problems Father        Social History     Socioeconomic History    Marital status:    Tobacco Use    Smoking status: Former    Smokeless tobacco: Never   Vaping Use    Vaping status: Former   Substance and Sexual Activity    Alcohol use: Never    Drug use: Never    Sexual activity: Defer           Objective   Physical Exam  Vitals and nursing note reviewed.   Constitutional:       General: She is not in acute distress.     Appearance: She is well-developed. She is obese. She is not ill-appearing.   HENT:      Head: Normocephalic and atraumatic.   Eyes:      Pupils: Pupils are equal, round, and reactive to light.   Cardiovascular:      Rate and Rhythm: Regular rhythm. Tachycardia present.      Heart sounds: Normal heart sounds. No murmur heard.  Pulmonary:      Effort: Tachypnea present.      Breath sounds: Examination of the right-lower field reveals rales. Examination of the left-lower field reveals rales. Rales present.   Musculoskeletal:         General: Normal range of motion.      Right lower le+ Pitting Edema present.      Left lower le+ Pitting Edema present.   Skin:     Findings: Ecchymosis present.   Neurological:      Mental Status: She is alert and oriented to person, place, and time.         Procedures           ED Course      /80   Pulse 113   Temp 98.6 °F (37 °C) (Oral)   Resp (!) 32   Ht 167.6 cm (66\")   Wt 80.7 kg (178 lb)   SpO2 93%   BMI 28.73 kg/m²   Labs Reviewed   COMPREHENSIVE METABOLIC PANEL - Abnormal; Notable for the following components:     "   Result Value    Glucose 145 (*)     Potassium 6.0 (*)     CO2 21.0 (*)     Total Bilirubin 2.5 (*)     Anion Gap 16.0 (*)     All other components within normal limits    Narrative:     GFR Normal >60  Chronic Kidney Disease <60  Kidney Failure <15    The GFR formula is only valid for adults with stable renal function between ages 18 and 70.   URINALYSIS W/ MICROSCOPIC IF INDICATED (NO CULTURE) - Abnormal; Notable for the following components:    Color, UA Dark Yellow (*)     Appearance, UA Cloudy (*)     Ketones, UA Trace (*)     Bilirubin, UA Small (1+) (*)     Blood, UA Small (1+) (*)     Protein,  mg/dL (2+) (*)     Leuk Esterase, UA Small (1+) (*)     Nitrite, UA Positive (*)     All other components within normal limits   BLOOD GAS, ARTERIAL - Abnormal; Notable for the following components:    pH, Arterial 7.321 (*)     Base Excess, Arterial -4.6 (*)     All other components within normal limits   TROPONIN - Abnormal; Notable for the following components:    HS Troponin T 29 (*)     All other components within normal limits    Narrative:     High Sensitive Troponin T Reference Range:  <14.0 ng/L- Negative Female for AMI  <22.0 ng/L- Negative Male for AMI  >=14 - Abnormal Female indicating possible myocardial injury.  >=22 - Abnormal Male indicating possible myocardial injury.   Clinicians would have to utilize clinical acumen, EKG, Troponin, and serial changes to determine if it is an Acute Myocardial Infarction or myocardial injury due to an underlying chronic condition.        CK - Abnormal; Notable for the following components:    Creatine Kinase 208 (*)     All other components within normal limits   CBC WITH AUTO DIFFERENTIAL - Abnormal; Notable for the following components:    Hemoglobin 11.4 (*)     MCV 99.2 (*)     MCHC 29.4 (*)     RDW 17.1 (*)     RDW-SD 62.3 (*)     Platelets 125 (*)     Neutrophil % 88.0 (*)     Lymphocyte % 2.7 (*)     Eosinophil % 0.0 (*)     Immature Grans % 0.7 (*)      Neutrophils, Absolute 8.58 (*)     Lymphocytes, Absolute 0.26 (*)     Immature Grans, Absolute 0.07 (*)     All other components within normal limits   URINALYSIS, MICROSCOPIC ONLY - Abnormal; Notable for the following components:    RBC, UA Too Numerous to Count (*)     WBC, UA Too Numerous to Count (*)     Bacteria, UA 4+ (*)     Squamous Epithelial Cells, UA 3-6 (*)     All other components within normal limits   BNP (IN-HOUSE) - Abnormal; Notable for the following components:    proBNP 8,040.0 (*)     All other components within normal limits    Narrative:     This assay is used as an aid in the diagnosis of individuals suspected of having heart failure. It can be used as an aid in the diagnosis of acute decompensated heart failure (ADHF) in patients presenting with signs and symptoms of ADHF to the emergency department (ED). In addition, NT-proBNP of <300 pg/mL indicates ADHF is not likely.    Age Range Result Interpretation  NT-proBNP Concentration (pg/mL:      <50             Positive            >450                   Gray                 300-450                    Negative             <300    50-75           Positive            >900                  Gray                300-900                  Negative            <300      >75             Positive            >1800                  Gray                300-1800                  Negative            <300   RESPIRATORY PANEL PCR W/ COVID-19 (SARS-COV-2), NP SWAB IN UTM/VTP, 2 HR TAT - Normal    Narrative:     In the setting of a positive respiratory panel with a viral infection PLUS a negative procalcitonin without other underlying concern for bacterial infection, consider observing off antibiotics or discontinuation of antibiotics and continue supportive care. If the respiratory panel is positive for atypical bacterial infection (Bordetella pertussis, Chlamydophila pneumoniae, or Mycoplasma pneumoniae), consider antibiotic de-escalation to target atypical  bacterial infection.   POC LACTATE - Normal   BLOOD CULTURE   BLOOD CULTURE   URINE CULTURE   HIGH SENSITIVITIY TROPONIN T 2HR   POC LACTATE   CBC AND DIFFERENTIAL    Narrative:     The following orders were created for panel order CBC & Differential.  Procedure                               Abnormality         Status                     ---------                               -----------         ------                     CBC Auto Differential[[id number]]        Abnormal            Final result                 Please view results for these tests on the individual orders.     Medications   sodium chloride 0.9 % flush 10 mL (has no administration in time range)   cefTRIAXone (ROCEPHIN) 2 g in sodium chloride 0.9 % 100 mL MBP (has no administration in time range)   furosemide (LASIX) injection 80 mg (has no administration in time range)     XR Chest 1 View    Result Date: 5/17/2024  Small bilateral pleural effusions with vascular congestion. Electronically Signed: Adalid Silverio MD  5/17/2024 10:50 PM EDT  Workstation ID: [id number]    CT Head Without Contrast    Result Date: 5/17/2024  No acute intracranial abnormality. Electronically Signed: Davey Wells MD  5/17/2024 10:45 PM EDT  Workstation ID: [id number]    CT Cervical Spine Without Contrast    Result Date: 5/17/2024  Impression: No evidence of fracture. Degenerative changes of the cervical spine, greatest at C5-6 and C6-7 as above. Electronically Signed: Spencer No MD  5/17/2024 10:44 PM EDT  Workstation ID: [id number]                                          Medical Decision Making  Problems Addressed:  Acute UTI: complicated acute illness or injury  Fall, initial encounter: complicated acute illness or injury  Generalized weakness: complicated acute illness or injury  Hypervolemia, unspecified hypervolemia type: complicated acute illness or injury  Leg swelling: complicated acute illness or injury    Amount and/or Complexity of Data Reviewed  Labs: ordered.  "Decision-making details documented in ED Course.  Radiology: ordered. Decision-making details documented in ED Course.  ECG/medicine tests: ordered.     Details: My interpretation EKG reveals atrial fibrillation with an irregular rate of 108.  PVCs present as well with no acute ST elevation or ectopy.  Previous study completed 12/29/2015 is without atrial fibrillation and an include sinus rhythm with a regular rate of 70.  This is most recent EKG on file.  EKG was also reviewed by Dr. Ramirez, who is agreeable to my interpretation.    Risk  Prescription drug management.  Decision regarding hospitalization.    Patient is a pleasant 79-year-old obese  female with a history of chronic kidney disease who presents to the emergency room with complaints of generalized weakness, shortness of breath and leg swelling.  On exam, patient is alert and answers questions appropriately.  Normal S1/S2 without clicks murmurs.  No JVD but patient does have +2 bilateral pitting edema to her lower extremities.  Lungs have coarse crackles in bases bilaterally.  She is tachycardic and tachypneic with no diaphoresis.  Her abdomen is soft and nontender with normal bowel sounds throughout.  Initial differentials include fluid overload, pneumonia, electrolyte imbalance, acute UTI.  This is not a complete list.    IV was established labs were obtained.  Patient received above examination.  Her symptoms were managed with Lasix.  My interpretation of CT reveals no evidence of intracranial hemorrhage, midline shift or lesion.  CT study of C-spine is also unremarkable.  This is concurrent with radiologist.  My interpretation of x-ray reveals no pneumothorax but there is evidence of bilateral pleural effusions.  This did concur with radiologist, who also notes vascular congestion which further supports fluid overload diagnoses given her leg swelling.  Upon reassessment, patient continues to complain of feeling like she is \"drowning.\"  " Results were discussed with patient at this time I believe she would benefit from admission to the hospital for diuresis, treatment of acute UTI and further evaluation.  This was discussed with patient and family, who are agreeable.  Based on the clinical findings, at this time I anticipate the patient will require a 2 midnight stay.  I discussed the patient with JOANNA Castillo from the [facility] who is agreeable to the plan of care and has accepted patient for admission on behalf of Dr. Hanna.     Final diagnoses:   Acute UTI   Generalized weakness   Fall, initial encounter   Hypervolemia, unspecified hypervolemia type   Leg swelling       ED Disposition  ED Disposition       ED Disposition   Decision to Admit    Condition   --    Comment   Level of Care: Med/Surg [1]   Admitting Physician: KAREN HANNA [[id number]]   Attending Physician: KAREN HANNA [[id number]]   Bed Request Comments: tele                 No follow-up provider specified.       Medication List      No changes were made to your prescriptions during this visit.                Nataliia العلي, APRN  05/18/24 0102

## 2024-05-19 ENCOUNTER — APPOINTMENT (OUTPATIENT)
Dept: CT IMAGING | Facility: HOSPITAL | Age: 79
End: 2024-05-19
Payer: MEDICARE

## 2024-05-19 ENCOUNTER — APPOINTMENT (OUTPATIENT)
Dept: CARDIOLOGY | Facility: HOSPITAL | Age: 79
End: 2024-05-19
Payer: MEDICARE

## 2024-05-19 LAB
ALBUMIN SERPL-MCNC: 3.5 G/DL (ref 3.5–5.2)
ALBUMIN/GLOB SERPL: 1.4 G/DL
ALP SERPL-CCNC: 51 U/L (ref 39–117)
ALT SERPL W P-5'-P-CCNC: 9 U/L (ref 1–33)
ANION GAP SERPL CALCULATED.3IONS-SCNC: 7 MMOL/L (ref 5–15)
AST SERPL-CCNC: 16 U/L (ref 1–32)
BASOPHILS # BLD AUTO: 0.01 10*3/MM3 (ref 0–0.2)
BASOPHILS NFR BLD AUTO: 0.1 % (ref 0–1.5)
BILIRUB SERPL-MCNC: 0.9 MG/DL (ref 0–1.2)
BUN SERPL-MCNC: 20 MG/DL (ref 8–23)
BUN/CREAT SERPL: 26.7 (ref 7–25)
CALCIUM SPEC-SCNC: 9.3 MG/DL (ref 8.6–10.5)
CHLORIDE SERPL-SCNC: 104 MMOL/L (ref 98–107)
CO2 SERPL-SCNC: 29 MMOL/L (ref 22–29)
CREAT SERPL-MCNC: 0.75 MG/DL (ref 0.57–1)
DEPRECATED RDW RBC AUTO: 62.3 FL (ref 37–54)
EGFRCR SERPLBLD CKD-EPI 2021: 82.1 ML/MIN/1.73
EOSINOPHIL # BLD AUTO: 0.09 10*3/MM3 (ref 0–0.4)
EOSINOPHIL NFR BLD AUTO: 1.3 % (ref 0.3–6.2)
ERYTHROCYTE [DISTWIDTH] IN BLOOD BY AUTOMATED COUNT: 17.4 % (ref 12.3–15.4)
GLOBULIN UR ELPH-MCNC: 2.5 GM/DL
GLUCOSE SERPL-MCNC: 117 MG/DL (ref 65–99)
HCT VFR BLD AUTO: 33.6 % (ref 34–46.6)
HGB BLD-MCNC: 9.9 G/DL (ref 12–15.9)
IMM GRANULOCYTES # BLD AUTO: 0.01 10*3/MM3 (ref 0–0.05)
IMM GRANULOCYTES NFR BLD AUTO: 0.1 % (ref 0–0.5)
LYMPHOCYTES # BLD AUTO: 0.53 10*3/MM3 (ref 0.7–3.1)
LYMPHOCYTES NFR BLD AUTO: 7.9 % (ref 19.6–45.3)
MCH RBC QN AUTO: 28.7 PG (ref 26.6–33)
MCHC RBC AUTO-ENTMCNC: 29.5 G/DL (ref 31.5–35.7)
MCV RBC AUTO: 97.4 FL (ref 79–97)
MONOCYTES # BLD AUTO: 0.62 10*3/MM3 (ref 0.1–0.9)
MONOCYTES NFR BLD AUTO: 9.3 % (ref 5–12)
NEUTROPHILS NFR BLD AUTO: 5.44 10*3/MM3 (ref 1.7–7)
NEUTROPHILS NFR BLD AUTO: 81.3 % (ref 42.7–76)
NRBC BLD AUTO-RTO: 0 /100 WBC (ref 0–0.2)
PLATELET # BLD AUTO: 128 10*3/MM3 (ref 140–450)
PMV BLD AUTO: 9.6 FL (ref 6–12)
POTASSIUM SERPL-SCNC: 4 MMOL/L (ref 3.5–5.2)
PROT SERPL-MCNC: 6 G/DL (ref 6–8.5)
RBC # BLD AUTO: 3.45 10*6/MM3 (ref 3.77–5.28)
SODIUM SERPL-SCNC: 140 MMOL/L (ref 136–145)
WBC NRBC COR # BLD AUTO: 6.7 10*3/MM3 (ref 3.4–10.8)

## 2024-05-19 PROCEDURE — 85025 COMPLETE CBC W/AUTO DIFF WBC: CPT | Performed by: NURSE PRACTITIONER

## 2024-05-19 PROCEDURE — 97535 SELF CARE MNGMENT TRAINING: CPT

## 2024-05-19 PROCEDURE — 63710000001 TACROLIMUS PER 1 MG: Performed by: NURSE PRACTITIONER

## 2024-05-19 PROCEDURE — 80053 COMPREHEN METABOLIC PANEL: CPT | Performed by: NURSE PRACTITIONER

## 2024-05-19 PROCEDURE — 97162 PT EVAL MOD COMPLEX 30 MIN: CPT

## 2024-05-19 PROCEDURE — 97166 OT EVAL MOD COMPLEX 45 MIN: CPT

## 2024-05-19 PROCEDURE — 63710000001 PREDNISONE PER 5 MG: Performed by: NURSE PRACTITIONER

## 2024-05-19 PROCEDURE — 97116 GAIT TRAINING THERAPY: CPT

## 2024-05-19 PROCEDURE — 25010000002 FUROSEMIDE PER 20 MG: Performed by: INTERNAL MEDICINE

## 2024-05-19 PROCEDURE — 99233 SBSQ HOSP IP/OBS HIGH 50: CPT | Performed by: INTERNAL MEDICINE

## 2024-05-19 PROCEDURE — 25010000002 CEFTRIAXONE PER 250 MG: Performed by: FAMILY MEDICINE

## 2024-05-19 PROCEDURE — 71250 CT THORAX DX C-: CPT

## 2024-05-19 RX ORDER — NYSTATIN 100000 [USP'U]/G
POWDER TOPICAL EVERY 12 HOURS SCHEDULED
Status: DISCONTINUED | OUTPATIENT
Start: 2024-05-19 | End: 2024-05-22 | Stop reason: HOSPADM

## 2024-05-19 RX ORDER — HYDROCODONE BITARTRATE AND ACETAMINOPHEN 5; 325 MG/1; MG/1
1 TABLET ORAL EVERY 6 HOURS PRN
Status: DISCONTINUED | OUTPATIENT
Start: 2024-05-19 | End: 2024-05-22 | Stop reason: HOSPADM

## 2024-05-19 RX ORDER — FERROUS SULFATE 324(65)MG
324 TABLET, DELAYED RELEASE (ENTERIC COATED) ORAL
Status: DISCONTINUED | OUTPATIENT
Start: 2024-05-20 | End: 2024-05-22 | Stop reason: HOSPADM

## 2024-05-19 RX ADMIN — ACETAMINOPHEN 650 MG: 325 TABLET, FILM COATED ORAL at 12:29

## 2024-05-19 RX ADMIN — DILTIAZEM HYDROCHLORIDE 90 MG: 30 TABLET, FILM COATED ORAL at 20:28

## 2024-05-19 RX ADMIN — PANTOPRAZOLE SODIUM 40 MG: 40 INJECTION, POWDER, FOR SOLUTION INTRAVENOUS at 05:17

## 2024-05-19 RX ADMIN — CEFTRIAXONE 1000 MG: 1 INJECTION, POWDER, FOR SOLUTION INTRAMUSCULAR; INTRAVENOUS at 01:23

## 2024-05-19 RX ADMIN — GUAIFENESIN 1200 MG: 600 TABLET, EXTENDED RELEASE ORAL at 20:28

## 2024-05-19 RX ADMIN — LEVOTHYROXINE SODIUM 50 MCG: 0.05 TABLET ORAL at 05:17

## 2024-05-19 RX ADMIN — PREDNISONE 5 MG: 5 TABLET ORAL at 09:20

## 2024-05-19 RX ADMIN — APIXABAN 5 MG: 5 TABLET, FILM COATED ORAL at 20:28

## 2024-05-19 RX ADMIN — DULOXETINE HYDROCHLORIDE 40 MG: 20 CAPSULE, DELAYED RELEASE ORAL at 09:20

## 2024-05-19 RX ADMIN — APIXABAN 5 MG: 5 TABLET, FILM COATED ORAL at 09:20

## 2024-05-19 RX ADMIN — GUAIFENESIN 1200 MG: 600 TABLET, EXTENDED RELEASE ORAL at 09:20

## 2024-05-19 RX ADMIN — Medication 10 ML: at 09:20

## 2024-05-19 RX ADMIN — Medication 10 ML: at 20:29

## 2024-05-19 RX ADMIN — FUROSEMIDE 40 MG: 10 INJECTION, SOLUTION INTRAMUSCULAR; INTRAVENOUS at 20:28

## 2024-05-19 RX ADMIN — TACROLIMUS 1 MG: 1 CAPSULE ORAL at 20:28

## 2024-05-19 RX ADMIN — TACROLIMUS 1 MG: 1 CAPSULE ORAL at 09:20

## 2024-05-19 RX ADMIN — METOPROLOL SUCCINATE 50 MG: 50 TABLET, FILM COATED, EXTENDED RELEASE ORAL at 09:20

## 2024-05-19 RX ADMIN — HYDROCODONE BITARTRATE AND ACETAMINOPHEN 1 TABLET: 5; 325 TABLET ORAL at 20:28

## 2024-05-19 RX ADMIN — FUROSEMIDE 40 MG: 10 INJECTION, SOLUTION INTRAMUSCULAR; INTRAVENOUS at 09:20

## 2024-05-19 RX ADMIN — NYSTATIN: 100000 POWDER TOPICAL at 20:29

## 2024-05-19 NOTE — PROGRESS NOTES
Cardiology Progress Note    Patient Identification:  Name: Jaja Carcamo  Age: 79 y.o.  Sex: female  :  1945  MRN: [id number]                 Follow Up / Chief Complaint: A-fib with RVR  Chief Complaint   Patient presents with    Shortness of Breath       Interval History: Patient with lung cancer possible recurrence and COPD presented with A-fib with RVR is on IV Cardizem.       Subjective: Patient seen and examined.  Chart reviewed.  Labs reviewed.  Discussed with RN taking care of patient.      Objective:  2024: CMP with a glucose of 117, CBC with a hemoglobin of 9.9, platelet count of 128.    History of present illness:    Ms. Jaja Carcamo has PMH of     CAD, cardiac cath 2022 30% LAD 50% RCA  Lung cancer  COPD  Hypertension  ESRD, granulomatous polyangiitis, HD,  donor kidney transplant   Dyslipidemia  Hypothyroidism  Hysterectomy, lung lobectomy, renal transplantation  Former smoker  Allergies/intolerance to zolpidem     Presented through emergency room from 2024 with complaint of shortness of breath.  Patient reportedly fell last night and remained on the ground till morning when son found her and called EMS.  Patient is on chronic home O2 at 4 L.  Patient was found to be in A-fib with RVR and was started on IV Cardizem.  Cardiology was consulted.  Patient states that lung cancer was back and she is seeing thoracic surgeon in August and nothing has been done since then.  Details about her recurrent lung cancer schedule     Data:  Labs  and 2024 reveal HS troponin of 29 and 30, proBNP 8040.  TSH 0.645.  Hemoglobin is 10.  Chest x-ray was pulmonary effusion and pulmonary congestion.  EKG done 2024 reviewed/interpreted by me reveals A-fib with a rate of 108 bpm with PVCs.        Assessment:  :     A-fib with RVR  [id number] SCORE   [id number] Score: 5 (2024  3:54 PM)     (Due to age greater than 75, female gender, hypertension, vascular disease)      Status post fall  History of lung cancer, COPD  Hypertension  CKD history of kidney transplant  Anemia  Dyslipidemia           Recommendations / Plan:         Telemetry is revealing A-fib with intermittent RVR  Continue IV Cardizem and monitor closely by monitoring hemodynamics rhythm and labs.  Patient would benefit from anticoagulation we will start after workup for metastatic lung cancer..  Defer to pulmonary to evaluate and treat her lung disease including what patient is saying about recurrence of lung cancer  Will check an echocardiogram.  Patient states that her lung cancer has reoccurred but is a poor historian.  .    Will follow-up and consider further evaluation treatment.       Copied text in this portion of the note has been reviewed and is accurate as of 5/19/2024    Past Medical History:  Past Medical History:   Diagnosis Date    Cancer     lung    COPD (chronic obstructive pulmonary disease)     History of appendectomy     Hypertension     Renal disease     Hartman syndrome      Past Surgical History:  Past Surgical History:   Procedure Laterality Date    BREAST SURGERY Left     cysts rmeoved    CARDIAC CATHETERIZATION Right 8/12/2022    Procedure: Left Heart Cath and coronary angiogram;  Surgeon: Jak Gavin MD;  Location: [facility] CATH INVASIVE LOCATION;  Service: Cardiology;  Laterality: Right;    CLOSED REDUCTION WRIST FRACTURE Right 3/1/2022    Procedure: WRIST CLOSED REDUCTION;  Surgeon: Gaudencio Townsend MD;  Location: [facility] MAIN OR;  Service: Orthopedics;  Laterality: Right;    CYSTOSCOPY      HYSTERECTOMY      LUNG LOBECTOMY Right     TRANSPLANTATION RENAL          Social History:   Social History     Tobacco Use    Smoking status: Former    Smokeless tobacco: Never   Substance Use Topics    Alcohol use: Never      Family History:  Family History   Problem Relation Age of Onset    No Known Problems Mother     No Known Problems Father           Allergies:  Allergies   Allergen Reactions     "Zolpidem Other (See Comments), Unknown (See Comments) and Unknown - High Severity     KEPT HER AWAKE  KEPT HER AWAKE  KEPT HER AWAKE  KEPT HER AWAKE       Scheduled Meds:  apixaban, 5 mg, Q12H  cefTRIAXone, 1,000 mg, Q24H  DULoxetine, 40 mg, Daily  furosemide, 40 mg, BID  guaiFENesin, 1,200 mg, BID  levothyroxine, 50 mcg, Q AM  metoprolol succinate XL, 50 mg, Daily  pantoprazole, 40 mg, Q AM  predniSONE, 5 mg, Daily  sodium chloride, 10 mL, Q12H  tacrolimus, 1 mg, BID          Review of Systems:   ROS  Review of Systems   Constitution: Negative for chills and fever.   Cardiovascular: Negative for chest pain and palpitations.   Respiratory: Negative for cough and hemoptysis.    Gastrointestinal: Negative for nausea.        Constitutional:  Temp:  [97.4 °F (36.3 °C)-98.2 °F (36.8 °C)] 98 °F (36.7 °C)  Heart Rate:  [] 73  Resp:  [20-25] 21  BP: ()/(52-86) 134/73    Physical Exam   /73   Pulse 73   Temp 98 °F (36.7 °C) (Oral)   Resp 21   Ht 167.6 cm (66\")   Wt 89.9 kg (198 lb 3.1 oz)   SpO2 92%   BMI 31.99 kg/m²   General:  Appears in no acute distress  Eyes: Sclera is anicteric,  conjunctiva is clear   HEENT:  No JVD. Thyroid not visibly enlarged. No mucosal pallor or cyanosis  Respiratory: Respirations regular and unlabored at rest.  Scattered rhonchi  Cardiovascular: S1,S2 irregularly irregular rate  Gastrointestinal: Abdomen nondistended.  Musculoskeletal:  No abnormal movements  Extremities: No digital clubbing or cyanosis  Skin: Color pink.   Neuro: Alert and awake.    INTAKE AND OUTPUT:    Intake/Output Summary (Last 24 hours) at 5/19/2024 0542  Last data filed at 5/19/2024 0500  Gross per 24 hour   Intake 1138 ml   Output 2600 ml   Net -1462 ml       Cardiographics  Telemetry: A-fib with intermittent RVR    ECG:   ECG 12 Lead Dyspnea   Final Result   HEART RATE= 108  bpm   RR Interval= 556  ms   WA Interval=   ms   P Horizontal Axis=   deg   P Front Axis=   deg   QRSD Interval= 79  ms " "  QT Interval= 333  ms   QTcB= 447  ms   QRS Axis= -33  deg   T Wave Axis= 64  deg   - ABNORMAL ECG -   Atrial fibrillation   Ventricular premature complex   Left axis deviation   Low voltage, precordial leads   Electronically Signed By: Baldev Ramirez (Derek) 18-May-2024 06:19:24   Date and Time of Study: 2024-05-17 22:05:58        I have personally reviewed EKG    Echocardiogram: Results for orders placed during the hospital encounter of 08/11/22    Adult Transthoracic Echo Limited W/ Cont if Necessary Per Protocol    Interpretation Summary  · Calculated left ventricular EF = 55% Estimated left ventricular EF was in agreement with the calculated left ventricular EF.  · Left ventricular diastolic function was not assessed.  · The right atrial cavity is borderline dilated.  · Moderate mitral valve regurgitation is present.      Lab Review   I have reviewed the labs  Results from last 7 days   Lab Units 05/18/24  0057 05/17/24  2220   CK TOTAL U/L  --  208*   HSTROP T ng/L 30* 29*         Results from last 7 days   Lab Units 05/18/24  0509   SODIUM mmol/L 141   POTASSIUM mmol/L 5.1   BUN mg/dL 22   CREATININE mg/dL 0.86   CALCIUM mg/dL 9.8         Results from last 7 days   Lab Units 05/19/24  0521 05/18/24  0509 05/17/24  2220   WBC 10*3/mm3 6.70 9.01 9.75   HEMOGLOBIN g/dL 9.9* 10.4* 11.4*   HEMATOCRIT % 33.6* 35.3 38.8   PLATELETS 10*3/mm3 128* 116* 125*           RADIOLOGY:  Imaging Results (Last 24 Hours)       ** No results found for the last 24 hours. **                  )5/19/2024  Roni Cha MD      EMR Dragon/Transcription:   \"Dictated utilizing Dragon dictation\".   "

## 2024-05-19 NOTE — PLAN OF CARE
Goal Outcome Evaluation:  Plan of Care Reviewed With: patient           Outcome Evaluation: Pt is a 80 YO F admitted s/p fall at home, with prolonged time spent on ground following. Pt reports she lives home alone and is generally Independent with ADLs, and is ambulating wiht RWx but admits to having difficutly caring for self recently. Pt reports she has a son who comes over to assist with certain things, such as shopping and trash, but overall she cares for self. Pt this date demonstrates impaired mobility, requiring MOD A x2 to come to sitting EOB, MOD A to stand and MIN A for short distance ambulation. Pt reports fatigue with mobility, but states she is improved from admission. Pt is unsafe for return home and remains high falls risk. Recommendaiton is SNF at d/c.      Anticipated Discharge Disposition (PT): skilled nursing facility

## 2024-05-19 NOTE — THERAPY EVALUATION
Patient Name: Jaja Carcamo  : 1945    MRN: [id number]                              Today's Date: 2024       Admit Date: 2024    Visit Dx:     ICD-10-CM ICD-9-CM   1. Acute UTI  N39.0 599.0   2. Generalized weakness  R53.1 780.79   3. Fall, initial encounter  W19.XXXA E888.9   4. Hypervolemia, unspecified hypervolemia type  E87.70 276.69   5. Leg swelling  M79.89 729.81     Patient Active Problem List   Diagnosis    Right wrist fracture    COVID-19 virus detected    COPD (chronic obstructive pulmonary disease)    NSTEMI (non-ST elevated myocardial infarction)    Cytokine release syndrome, grade 1    Acute UTI     Past Medical History:   Diagnosis Date    Cancer     lung    COPD (chronic obstructive pulmonary disease)     History of appendectomy     Hypertension     Renal disease     Hartman syndrome      Past Surgical History:   Procedure Laterality Date    BREAST SURGERY Left     cysts rmeoved    CARDIAC CATHETERIZATION Right 2022    Procedure: Left Heart Cath and coronary angiogram;  Surgeon: Jak Gavin MD;  Location: [facility] CATH INVASIVE LOCATION;  Service: Cardiology;  Laterality: Right;    CLOSED REDUCTION WRIST FRACTURE Right 3/1/2022    Procedure: WRIST CLOSED REDUCTION;  Surgeon: Gaudencio Townsend MD;  Location: [facility] MAIN OR;  Service: Orthopedics;  Laterality: Right;    CYSTOSCOPY      HYSTERECTOMY      LUNG LOBECTOMY Right     TRANSPLANTATION RENAL        General Information       Row Name 24 1411          OT Time and Intention    Document Type evaluation  -MS     Mode of Treatment occupational therapy  -MS       Row Name 24 1411          General Information    Patient Profile Reviewed yes  -MS     Prior Level of Function independent:;ADL's;all household mobility  -MS     Existing Precautions/Restrictions fall;oxygen therapy device and L/min  4L home O2, 5L currently  -MS     Barriers to Rehab medically complex  -MS       Row Name 24 1411          Occupational Profile  "   Reason for Services/Referral (Occupational Profile) Pt is a 80 y/o F admitted to [facility] 5/18/24 with c/o dyspnea, s/p fall, was found on ground overnight. PMHx significant for hx lung cancer, wears 4L home O2. At baseline pt resides alone in [facility] with 2 LILLIAM. Pt typically (I) with ADLs, (I) with mobility with RW. Pt does not drive, son provides transportation, runs errands and and assist with household management. Pt reports 3 falls within last few weeks. Pt reports difficulty with short distance ambulation within home d/t \"smothering\" and dyspnea.  -MS     Environmental Supports and Barriers (Occupational Profile) limited physical assist 24/7  -MS       Row Name 05/19/24 1411          Living Environment    People in Home alone  -MS       Row Name 05/19/24 1411          Home Main Entrance    Number of Stairs, Main Entrance two  -MS       Row Name 05/19/24 1411          Stairs Within Home, Primary    Stairs, Within Home, Primary basement, does not access  -MS       Row Name 05/19/24 1411          Cognition    Orientation Status (Cognition) oriented x 4  -MS       Row Name 05/19/24 1411          Safety Issues, Functional Mobility    Safety Issues Affecting Function (Mobility) insight into deficits/self-awareness;judgment  -MS     Impairments Affecting Function (Mobility) balance;endurance/activity tolerance;strength;shortness of breath  -MS               User Key  (r) = Recorded By, (t) = Taken By, (c) = Cosigned By      Initials Name Provider Type    Tasneem Schroeder OT Occupational Therapist                     Mobility/ADL's       Row Name 05/19/24 1413          Bed Mobility    Bed Mobility supine-sit;sit-supine;scooting/bridging  -MS     Scooting/Bridging [place] (Bed Mobility) maximum assist (25% patient effort);2 person assist  -MS     Supine-Sit [place] (Bed Mobility) moderate assist (50% patient effort);2 person assist;verbal cues  -MS     Sit-Supine [place] (Bed Mobility) moderate assist (50% " patient effort);2 person assist;verbal cues  -MS     Bed Mobility, Safety Issues decreased use of arms for pushing/pulling;decreased use of legs for bridging/pushing  -MS     Assistive Device (Bed Mobility) head of bed elevated;bed rails;draw sheet  -MS       Row Name 05/19/24 1413          Transfers    Transfers sit-stand transfer;toilet transfer  -MS       Row Name 05/19/24 1413          Sit-Stand Transfer    Sit-Stand [place] (Transfers) moderate assist (50% patient effort)  -MS     Assistive Device (Sit-Stand Transfers) walker, front-wheeled  -MS     Comment, (Sit-Stand Transfer) cues for hand placement  -MS       Row Name 05/19/24 1413          Toilet Transfer    Type (Toilet Transfer) sit-stand  -MS     [place] Level (Toilet Transfer) moderate assist (50% patient effort)  -MS     Assistive Device (Toilet Transfer) walker, front-wheeled  -MS       Row Name 05/19/24 1413          Activities of Daily Living    BADL Assessment/Intervention toileting  -MS       Row Name 05/19/24 1413          Toileting Assessment/Training    [place] Level (Toileting) standby assist  -MS     Assistive Devices (Toileting) commode, bedside without drop arms  -MS     Position (Toileting) unsupported sitting  -MS               User Key  (r) = Recorded By, (t) = Taken By, (c) = Cosigned By      Initials Name Provider Type    Tasneem Schroeder OT Occupational Therapist                   Obj/Interventions       Row Name 05/19/24 1414          Sensory Assessment (Somatosensory)    Sensory Assessment (Somatosensory) UE sensation intact  -MS       Row Name 05/19/24 1414          Vision Assessment/Intervention    Visual Impairment/Limitations WFL  -MS       Row Name 05/19/24 1414          Range of Motion Comprehensive    Comment, General Range of Motion BUE WFL  -MS       Row Name 05/19/24 1414          Strength Comprehensive (MMT)    Comment, General Manual Muscle Testing (MMT) Assessment BUE grossly 3+/5  -MS       Row Name  05/19/24 1414          Balance    Balance Assessment sitting static balance;sitting dynamic balance;standing static balance;standing dynamic balance  -MS     Static Sitting Balance standby assist  -MS     Dynamic Sitting Balance contact guard  -MS     Position, Sitting Balance unsupported;sitting edge of bed  -MS     Static Standing Balance contact guard  -MS     Dynamic Standing Balance minimal assist  -MS     Position/Device Used, Standing Balance supported;walker, front-wheeled  -MS               User Key  (r) = Recorded By, (t) = Taken By, (c) = Cosigned By      Initials Name Provider Type    Tasneem Schroeder OT Occupational Therapist                   Goals/Plan       Row Name 05/19/24 1418          Bed Mobility Goal 1 (OT)    Activity/Assistive Device (Bed Mobility Goal 1, OT) bed mobility activities, all  -MS     [place] Level/Cues Needed (Bed Mobility Goal 1, OT) modified independence  -MS     Time Frame (Bed Mobility Goal 1, OT) long term goal (LTG);2 weeks  -MS     Progress/Outcomes (Bed Mobility Goal 1, OT) new goal  -MS       Row Name 05/19/24 1418          Transfer Goal 1 (OT)    Activity/Assistive Device (Transfer Goal 1, OT) transfers, all  -MS     [place] Level/Cues Needed (Transfer Goal 1, OT) modified independence  -MS     Time Frame (Transfer Goal 1, OT) long term goal (LTG);2 weeks  -MS     Progress/Outcome (Transfer Goal 1, OT) new goal  -MS       Row Name 05/19/24 1418          Dressing Goal 1 (OT)    Activity/Device (Dressing Goal 1, OT) lower body dressing  -MS     [place]/Cues Needed (Dressing Goal 1, OT) modified independence  -MS     Time Frame (Dressing Goal 1, OT) long term goal (LTG);2 weeks  -MS     Progress/Outcome (Dressing Goal 1, OT) new goal  -MS       Row Name 05/19/24 1418          Toileting Goal 1 (OT)    Activity/Device (Toileting Goal 1, OT) toileting skills, all  -MS     [place] Level/Cues Needed (Toileting Goal 1, OT) modified independence  -MS      "Time Frame (Toileting Goal 1, OT) long term goal (LTG);2 weeks  -MS     Progress/Outcome (Toileting Goal 1, OT) new goal  -MS       Row Name 05/19/24 1418          Problem Specific Goal 1 (OT)    Problem Specific Goal 1 (OT) increase standing activity tolerance needed for ADL routine >5 mintues without rest break  -MS     Time Frame (Problem Specific Goal 1, OT) long term goal (LTG);2 weeks  -MS     Progress/Outcome (Problem Specific Goal 1, OT) new goal  -MS       Row Name 05/19/24 1418          Therapy Assessment/Plan (OT)    Planned Therapy Interventions (OT) activity tolerance training;adaptive equipment training;BADL retraining;functional balance retraining;IADL retraining;occupation/activity based interventions;passive ROM/stretching;patient/caregiver education/training;transfer/mobility retraining;strengthening exercise;ROM/therapeutic exercise  -MS               User Key  (r) = Recorded By, (t) = Taken By, (c) = Cosigned By      Initials Name Provider Type    MS Tasneem Deleon, OT Occupational Therapist                   Clinical Impression       Row Name 05/19/24 1415          Pain Assessment    Pretreatment Pain Rating 0/10 - no pain  -MS     Posttreatment Pain Rating 0/10 - no pain  -MS       Row Name 05/19/24 1415          Plan of Care Review    Plan of Care Reviewed With patient  -MS     Progress no change  -MS     Outcome Evaluation Pt is a 78 y/o F admitted to [facility] 5/18/24 with c/o dyspnea, s/p fall, was found on ground overnight. At baseline pt resides alone in [facility] with 2 LILLIAM. Pt typically (I) with ADLs, (I) with mobility with RW. Pt does not drive, son provides transportation, runs errands and and assist with household management. Pt reports 3 falls within last few weeks. Pt reports difficulty with short distance ambulation within home d/t \"smothering\" and dyspnea. This date pt A&Ox4 on 5L O2 and in supine upon arrival. Pt requires mod A x2 supine <>sit, comes to standing with mod A with RW and " ambulates within room to [facility] with [facility]. Pt completes toileting task with SBA. Pt experiences fatigue after ADL task, demo poor activity tolerance. Pt is at significant risk for falls and is not safe to dc home at this time. Pt hesitant to dc to rehab, however will discuss with son. OT recommending SNF when medically appropriate for dc, pt likely to require assist with ADLs and mobility. OT will follow while admitted.  -MS       Row Name 05/19/24 1415          Therapy Assessment/Plan (OT)    Rehab Potential (OT) good, to achieve stated therapy goals  -MS     Criteria for Skilled Therapeutic Interventions Met (OT) yes;meets criteria;skilled treatment is necessary  -MS     Therapy Frequency (OT) 3 times/wk  -MS     Predicted Duration of Therapy Intervention (OT) until d/c  -MS       Row Name 05/19/24 1415          Therapy Plan Review/Discharge Plan (OT)    Anticipated Discharge Disposition (OT) skilled nursing facility  -MS       Row Name 05/19/24 1415          Vital Signs    Pre Systolic BP Rehab 140  -MS     Pre Treatment Diastolic BP 88  -MS     Pretreatment Heart Rate (beats/min) 97  -MS     Posttreatment Heart Rate (beats/min) 93  -MS     Pretreatment Resp Rate (breaths/min) 21  -MS     Pre SpO2 (%) 93  -MS     O2 Delivery Pre Treatment nasal cannula  -MS     O2 Delivery Intra Treatment nasal cannula  -MS     Post SpO2 (%) 91  -MS     O2 Delivery Post Treatment nasal cannula  -MS     Pre Patient Position Supine  -MS     Intra Patient Position Standing  -MS     Post Patient Position Supine  -MS       Row Name 05/19/24 1415          Positioning and Restraints    Pre-Treatment Position in bed  -MS     Post Treatment Position bed  -MS     In Bed notified nsg;supine;call light within reach;encouraged to call for assist;exit alarm on  -MS               User Key  (r) = Recorded By, (t) = Taken By, (c) = Cosigned By      Initials Name Provider Type    Tasneem Schroeder, OT Occupational Therapist                    Outcome Measures       Row Name 05/19/24 1419          How much help from another is currently needed...    Putting on and taking off regular lower body clothing? 2  -MS     Bathing (including washing, rinsing, and drying) 2  -MS     Toileting (which includes using toilet bed pan or urinal) 3  -MS     Putting on and taking off regular upper body clothing 3  -MS     Taking care of personal grooming (such as brushing teeth) 4  -MS     Eating meals 4  -MS     AM-PAC 6 Clicks Score (OT) 18  -MS       Row Name 05/19/24 0800          How much help from another person do you currently need...    Turning from your back to your side while in flat bed without using bedrails? 2  -LB     Moving from lying on back to sitting on the side of a flat bed without bedrails? 2  -LB     Moving to and from a bed to a chair (including a wheelchair)? 2  -LB     Standing up from a chair using your arms (e.g., wheelchair, bedside chair)? 2  -LB     Climbing 3-5 steps with a railing? 2  -LB     To walk in hospital room? 2  -LB     AM-PAC 6 Clicks Score (PT) 12  -LB     Highest Level of Mobility Goal 4 --> Transfer to chair/commode  -LB       Row Name 05/19/24 1419          Functional Assessment    Outcome Measure Options AM-PAC 6 Clicks Daily Activity (OT)  -MS               User Key  (r) = Recorded By, (t) = Taken By, (c) = Cosigned By      Initials Name Provider Type    Tasneem Schroeder, OT Occupational Therapist    Valeria Mendez, RN Registered Nurse                    Occupational Therapy Education       Title: PT OT SLP Therapies (Done)       Topic: Occupational Therapy (Done)       Point: ADL training (Done)       Description:   Instruct learner(s) on proper safety adaptation and remediation techniques during self care or transfers.   Instruct in proper use of assistive devices.                  Learning Progress Summary             Patient Acceptance, E,TB, VU by MS at 5/19/2024 1420                         Point: Precautions  "(Done)       Description:   Instruct learner(s) on prescribed precautions during self-care and functional transfers.                  Learning Progress Summary             Patient Acceptance, E,TB, VU by MS at 5/19/2024 1420                         Point: Body mechanics (Done)       Description:   Instruct learner(s) on proper positioning and spine alignment during self-care, functional mobility activities and/or exercises.                  Learning Progress Summary             Patient Acceptance, E,TB, VU by MS at 5/19/2024 1420                                         User Key       Initials Effective Dates Name Provider Type Discipline    MS 07/13/22 -  Tasneem Deleon, KIMBER Occupational Therapist OT                  OT Recommendation and Plan  Planned Therapy Interventions (OT): activity tolerance training, adaptive equipment training, BADL retraining, functional balance retraining, IADL retraining, occupation/activity based interventions, passive ROM/stretching, patient/caregiver education/training, transfer/mobility retraining, strengthening exercise, ROM/therapeutic exercise  Therapy Frequency (OT): 3 times/wk  Plan of Care Review  Plan of Care Reviewed With: patient  Progress: no change  Outcome Evaluation: Pt is a 78 y/o F admitted to [facility] 5/18/24 with c/o dyspnea, s/p fall, was found on ground overnight. At baseline pt resides alone in [facility] with 2 LILLIAM. Pt typically (I) with ADLs, (I) with mobility with RW. Pt does not drive, son provides transportation, runs errands and and assist with household management. Pt reports 3 falls within last few weeks. Pt reports difficulty with short distance ambulation within home d/t \"smothering\" and dyspnea. This date pt A&Ox4 on 5L O2 and in supine upon arrival. Pt requires mod A x2 supine <>sit, comes to standing with mod A with RW and ambulates within room to [facility] with [facility]. Pt completes toileting task with SBA. Pt experiences fatigue after ADL task, demo poor activity " tolerance. Pt is at significant risk for falls and is not safe to dc home at this time. Pt hesitant to dc to rehab, however will discuss with son. OT recommending SNF when medically appropriate for dc, pt likely to require assist with ADLs and mobility. OT will follow while admitted.     Time Calculation:                   Tasneem Deleon OT  5/19/2024

## 2024-05-19 NOTE — PROGRESS NOTES
Name: Jaja Carcamo  Age: 79 y.o.  : 1945  Sex: female    24    Subjective  Patient admitted shortness of breath     Interval History   Patient overall feels better      Objective:    Vital Signs  Temp:  [97.7 °F (36.5 °C)-98.2 °F (36.8 °C)] 97.9 °F (36.6 °C)  Heart Rate:  [] 88  Resp:  [21-25] 21  BP: (109-140)/(53-86) 129/65        Intake/Output Summary (Last 24 hours) at 2024 1432  Last data filed at 2024 0800  Gross per 24 hour   Intake 648 ml   Output 1150 ml   Net -502 ml           Physical Exam  Physical Exam  Constitutional:       General: She is not in acute distress.     Appearance: She is well-developed. She is not diaphoretic.   HENT:      Head: Normocephalic and atraumatic.      Nose: Nose normal.   Eyes:      General:         Right eye: No discharge.         Left eye: No discharge.      Conjunctiva/sclera: Conjunctivae normal.      Pupils: Pupils are equal, round, and reactive to light.   Neck:      Thyroid: No thyromegaly.      Vascular: No JVD.      Trachea: No tracheal deviation.   Cardiovascular:      Rate and Rhythm: Normal rate and regular rhythm.      Heart sounds: Normal heart sounds. No murmur heard.     No friction rub. No gallop.   Pulmonary:      Effort: Pulmonary effort is normal. No respiratory distress.      Breath sounds: Normal breath sounds. No stridor. No wheezing or rales.   Chest:      Chest wall: No tenderness.   Abdominal:      General: Bowel sounds are normal. There is no distension.      Palpations: Abdomen is soft. There is no mass.      Tenderness: There is no abdominal tenderness. There is no guarding or rebound.   Musculoskeletal:         General: No tenderness or deformity. Normal range of motion.      Cervical back: Normal range of motion and neck supple.   Lymphadenopathy:      Cervical: No cervical adenopathy.   Skin:     General: Skin is warm and dry.      Coloration: Skin is not pale.      Findings: No erythema or rash.   Neurological:       Mental Status: She is alert and oriented to person, place, and time.      Cranial Nerves: No cranial nerve deficit.      Motor: No abnormal muscle tone.      Coordination: Coordination normal.      Deep Tendon Reflexes: Reflexes normal.   Psychiatric:         Behavior: Behavior normal.         Thought Content: Thought content normal.         Judgment: Judgment normal.             Results Review:      Results from last 7 days   Lab Units 24  0521 24  0509 24  2220   SODIUM mmol/L 140 141 141   CO2 mmol/L 29.0 25.0 21.0*   BUN mg/dL 20 22 22   CREATININE mg/dL 0.75 0.86 0.97   CALCIUM mg/dL 9.3 9.8 10.1   ALBUMIN g/dL 3.5 3.9 4.0   AST (SGOT) U/L 16 22 24   ALT (SGPT) U/L 9 10 12   EGFR mL/min/1.73 82.1 69.7 60.3       Results from last 7 days   Lab Units 24  0521 24  0509 24  2220   WBC 10*3/mm3 6.70 9.01 9.75       Imaging studies: I personally reviewed the patient's most recent pertinent imaging studies       Medication Review:   apixaban, 5 mg, Oral, Q12H  cefTRIAXone, 1,000 mg, Intravenous, Q24H  DULoxetine, 40 mg, Oral, Daily  [START ON 2024] ferrous sulfate, 324 mg, Oral, Daily With Breakfast  furosemide, 40 mg, Intravenous, BID  guaiFENesin, 1,200 mg, Oral, BID  levothyroxine, 50 mcg, Oral, Q AM  metoprolol succinate XL, 50 mg, Oral, Daily  pantoprazole, 40 mg, Intravenous, Q AM  predniSONE, 5 mg, Oral, Daily  sodium chloride, 10 mL, Intravenous, Q12H  tacrolimus, 1 mg, Oral, BID          Assessment  Patient with a history of  donor kidney transplant in 2017 with good allograft function.  Underlying chronic kidney disease in the past secondary to granulomatous polyangiitis.     Hyperkalemia  ?  Secondary to mild metabolic acidosis on admission and potassium supplementation     Abnormal urinalysis  Rule out urine tract infection     Shortness of breath  Respiratory panel pathogen was negative.  Abnormal urinalysis  COPD exacerbation versus congestive heart  failure chest x-ray showing pulmonary edema     Atrial fibrillation with rapid ventricular rate  Patient on anticoagulation with Eliquis, she is on a Cardizem drip  Coronary artery disease  Nonobstructive CAD     History of COPD     History of lung cancer.         Plan:  Patient's vital signs are stable.    Renal function stable.  BUN is 20 creatinine 0.7 serum sodium 140 potassium 4.0    Volume overload slowly improving.  Continue IV Lasix.  Will consider changing to oral dose in AM.    Urine culture growing gram-negative bacilli.  Continue antibiotics.  Follow-up culture sensitivity report        Konrad Osorio MD  05/19/24  14:32 EDT  Tel: [phone]  Fax: [phone]

## 2024-05-19 NOTE — PLAN OF CARE
Problem: Adjustment to Illness (Sepsis/Septic Shock)  Goal: Optimal Coping  Outcome: Ongoing, Progressing     Problem: Bleeding (Sepsis/Septic Shock)  Goal: Absence of Bleeding  Outcome: Ongoing, Progressing     Problem: Glycemic Control Impaired (Sepsis/Septic Shock)  Goal: Blood Glucose Level Within Desired Range  Outcome: Ongoing, Progressing     Problem: Infection Progression (Sepsis/Septic Shock)  Goal: Absence of Infection Signs and Symptoms  Outcome: Ongoing, Progressing  Intervention: Initiate Sepsis Management  Recent Flowsheet Documentation  Taken 5/19/2024 0430 by Mariaelena Bergman RN  Infection Prevention: rest/sleep promoted  Isolation Precautions:   contact   precautions maintained  Taken 5/19/2024 0030 by Mariaelnea Bergman RN  Infection Prevention:   single patient room provided   rest/sleep promoted   personal protective equipment utilized   hand hygiene promoted   equipment surfaces disinfected   environmental surveillance performed  Isolation Precautions:   contact   precautions maintained     Problem: Nutrition Impaired (Sepsis/Septic Shock)  Goal: Optimal Nutrition Intake  Outcome: Ongoing, Progressing     Problem: Fall Injury Risk  Goal: Absence of Fall and Fall-Related Injury  Outcome: Ongoing, Progressing  Intervention: Identify and Manage Contributors  Recent Flowsheet Documentation  Taken 5/19/2024 0030 by Mariaelena Bergman RN  Medication Review/Management: medications reviewed  Intervention: Promote Injury-Free Environment  Recent Flowsheet Documentation  Taken 5/19/2024 0430 by Mariaelena Bergman RN  Safety Promotion/Fall Prevention:   safety round/check completed   lighting adjusted   fall prevention program maintained   clutter free environment maintained   assistive device/personal items within reach  Taken 5/19/2024 0200 by Mariaelena Bergman RN  Safety Promotion/Fall Prevention: safety round/check completed  Taken 5/19/2024 0030 by Mariaelena Bergman RN  Safety Promotion/Fall Prevention:    safety round/check completed   lighting adjusted   fall prevention program maintained   clutter free environment maintained   assistive device/personal items within reach     Problem: Adult Inpatient Plan of Care  Goal: Plan of Care Review  Outcome: Ongoing, Progressing  Flowsheets (Taken 5/19/2024 0505)  Progress: no change  Plan of Care Reviewed With: patient  Outcome Evaluation: Pt found down at home, on floor overnight. Pt has a uti, iv antibiotics. Pt did trigger the sepsis screen, md's aware. [facility] afib r[facility]. Pt on a cardizem gtt, tolerating. Pt up with assist x3. 4 L n/c which is her baseline at home. External cath in place. VSS. Pt did complain of some back pain, tylenol did not help per patient but she did fall asleep and rest for several hours.  Goal: Patient-Specific Goal (Individualized)  Outcome: Ongoing, Progressing  Goal: Absence of Hospital-Acquired Illness or Injury  Outcome: Ongoing, Progressing  Intervention: Identify and Manage Fall Risk  Recent Flowsheet Documentation  Taken 5/19/2024 0430 by Mariaelena Bergman RN  Safety Promotion/Fall Prevention:   safety round/check completed   lighting adjusted   fall prevention program maintained   clutter free environment maintained   assistive device/personal items within reach  Taken 5/19/2024 0200 by Mariaelena Bergman RN  Safety Promotion/Fall Prevention: safety round/check completed  Taken 5/19/2024 0030 by Mariaelena Bergman RN  Safety Promotion/Fall Prevention:   safety round/check completed   lighting adjusted   fall prevention program maintained   clutter free environment maintained   assistive device/personal items within reach  Intervention: Prevent Skin Injury  Recent Flowsheet Documentation  Taken 5/19/2024 0430 by Mariaelena Bergman RN  Body Position: supine, legs elevated  Taken 5/19/2024 0030 by Mariaelena Bergman RN  Body Position: supine, legs elevated  Intervention: Prevent Infection  Recent Flowsheet  Documentation  Taken 5/19/2024 0430 by Mariaelena Bergman RN  Infection Prevention: rest/sleep promoted  Taken 5/19/2024 0030 by Mariaelena Bergman RN  Infection Prevention:   single patient room provided   rest/sleep promoted   personal protective equipment utilized   hand hygiene promoted   equipment surfaces disinfected   environmental surveillance performed  Goal: Optimal Comfort and Wellbeing  Outcome: Ongoing, Progressing  Intervention: Provide Person-Centered Care  Recent Flowsheet Documentation  Taken 5/19/2024 0030 by Mariaelena Bergman RN  Trust Relationship/Rapport: reassurance provided  Goal: Readiness for Transition of Care  Outcome: Ongoing, Progressing     Problem: COPD (Chronic Obstructive Pulmonary Disease) Comorbidity  Goal: Maintenance of COPD Symptom Control  Outcome: Ongoing, Progressing  Intervention: Maintain COPD-Symptom Control  Recent Flowsheet Documentation  Taken 5/19/2024 0030 by Mariaelena Bergman RN  Medication Review/Management: medications reviewed     Problem: Skin Injury Risk Increased  Goal: Skin Health and Integrity  Outcome: Ongoing, Progressing  Intervention: Optimize Skin Protection  Recent Flowsheet Documentation  Taken 5/19/2024 0430 by Mariaelena Bergman RN  Head of Bed (HOB) Positioning: HOB at 20-30 degrees  Taken 5/19/2024 0200 by Mariaelena Bergman RN  Head of Bed (HOB) Positioning: HOB at 20-30 degrees  Taken 5/19/2024 0030 by Mariaelena Bergman RN  Head of Bed (HOB) Positioning: HOB at 20-30 degrees   Goal Outcome Evaluation:  Plan of Care Reviewed With: patient        Progress: no change  Outcome Evaluation: Pt found down at home, on floor overnight. Pt has a uti, iv antibiotics. Pt did trigger the sepsis screen, md's aware. [facility]. Pt on a cardizem gtt, tolerating. Pt up with assist x3. 4 L n/c which is her baseline at home. External cath in place. VSS. Pt did complain of some back pain, tylenol did not help per patient but she did fall  asleep and rest for several hours.

## 2024-05-19 NOTE — PROGRESS NOTES
LOS: 1 day   Patient Care Team:  Jonathan Luna APRN as PCP - General (Family Medicine)  Jak Gavin MD as Cardiologist (Cardiology)    Subjective     Interval History:     Patient Complaints: pt talking on phone during my exam.  Poor historian     History taken from: patient    Review of Systems   Unable to perform ROS: Other           Objective     Vital Signs  Temp:  [97.7 °F (36.5 °C)-98.2 °F (36.8 °C)] 98 °F (36.7 °C)  Heart Rate:  [] 76  Resp:  [21-25] 21  BP: ()/(52-86) 132/70    Physical Exam:     General Appearance:    Alert, cooperative, in no acute distress, ill but not toxic   Head:    Normocephalic, without obvious abnormality, atraumatic   Eyes:            Lids and lashes normal, conjunctivae and sclerae normal, no   icterus, no pallor, corneas clear, PERRLA   Ears:    Ears appear intact with no abnormalities noted   Throat:   No oral lesions, no thrush, oral mucosa moist   Neck:   No adenopathy, supple, trachea midline, no thyromegaly, no   carotid bruit, no JVD   Lungs:     Clear to auscultation,respirations regular, even and                  unlabored    Heart:    Irreg irreg   Chest Wall:    No abnormalities observed   Abdomen:     Normal bowel sounds, no masses, no organomegaly, soft        non-tender, non-distended, no guarding, no rebound                tenderness   Extremities:   Moves all extremities well, 2+ edema, no cyanosis, no             redness   Pulses:   Pulses palpable and equal bilaterally   Skin:   No bleeding, bruising or rash   Lymph nodes:   No palpable adenopathy   Neurologic:   Cranial nerves 2 - 12 grossly intact, sensation intact, DTR       present and equal bilaterally        Results Review:    Lab Results (last 24 hours)       Procedure Component Value Units Date/Time    Comprehensive Metabolic Panel [[id number]]  (Abnormal) Collected: 05/19/24 0521    Specimen: Blood from Arm, Left Updated: 05/19/24 0602     Glucose 117 mg/dL      BUN 20 mg/dL       Creatinine 0.75 mg/dL      Sodium 140 mmol/L      Potassium 4.0 mmol/L      Chloride 104 mmol/L      CO2 29.0 mmol/L      Calcium 9.3 mg/dL      Total Protein 6.0 g/dL      Albumin 3.5 g/dL      ALT (SGPT) 9 U/L      AST (SGOT) 16 U/L      Alkaline Phosphatase 51 U/L      Total Bilirubin 0.9 mg/dL      Globulin 2.5 gm/dL      A/G Ratio 1.4 g/dL      BUN/Creatinine Ratio 26.7     Anion Gap 7.0 mmol/L      eGFR 82.1 mL/min/1.73     Narrative:      GFR Normal >60  Chronic Kidney Disease <60  Kidney Failure <15    The GFR formula is only valid for adults with stable renal function between ages 18 and 70.    CBC & Differential [[id number]]  (Abnormal) Collected: 05/19/24 0521    Specimen: Blood from Arm, Left Updated: 05/19/24 0533    Narrative:      The following orders were created for panel order CBC & Differential.  Procedure                               Abnormality         Status                     ---------                               -----------         ------                     CBC Auto Differential[[id number]]        Abnormal            Final result                 Please view results for these tests on the individual orders.    CBC Auto Differential [[id number]]  (Abnormal) Collected: 05/19/24 0521    Specimen: Blood from Arm, Left Updated: 05/19/24 0533     WBC 6.70 10*3/mm3      RBC 3.45 10*6/mm3      Hemoglobin 9.9 g/dL      Hematocrit 33.6 %      MCV 97.4 fL      MCH 28.7 pg      MCHC 29.5 g/dL      RDW 17.4 %      RDW-SD 62.3 fl      MPV 9.6 fL      Platelets 128 10*3/mm3      Neutrophil % 81.3 %      Lymphocyte % 7.9 %      Monocyte % 9.3 %      Eosinophil % 1.3 %      Basophil % 0.1 %      Immature Grans % 0.1 %      Neutrophils, Absolute 5.44 10*3/mm3      Lymphocytes, Absolute 0.53 10*3/mm3      Monocytes, Absolute 0.62 10*3/mm3      Eosinophils, Absolute 0.09 10*3/mm3      Basophils, Absolute 0.01 10*3/mm3      Immature Grans, Absolute 0.01 10*3/mm3      nRBC 0.0 /100 WBC     Blood Culture - Blood,  Arm, Left [[id number]]  (Normal) Collected: 05/18/24 0057    Specimen: Blood from Arm, Left Updated: 05/19/24 0116     Blood Culture No growth at 24 hours    Blood Culture - Blood, Arm, Left [[id number]]  (Normal) Collected: 05/18/24 0057    Specimen: Blood from Arm, Left Updated: 05/19/24 0116     Blood Culture No growth at 24 hours    Urine Culture - Urine, Straight Cath [[id number]]  (Normal) Collected: 05/17/24 2245    Specimen: Urine from Straight Cath Updated: 05/18/24 1308     Urine Culture Culture in progress    Iron Profile [[id number]]  (Abnormal) Collected: 05/18/24 0509    Specimen: Blood from Arm, Left Updated: 05/18/24 1232     Iron 31 mcg/dL      Iron Saturation (TSAT) 9 %      Transferrin 223 mg/dL      TIBC 332 mcg/dL              Imaging Results (Last 24 Hours)       ** No results found for the last 24 hours. **                 I reviewed the patient's new clinical results.    Medication Review:   Scheduled Meds:apixaban, 5 mg, Oral, Q12H  cefTRIAXone, 1,000 mg, Intravenous, Q24H  DULoxetine, 40 mg, Oral, Daily  furosemide, 40 mg, Intravenous, BID  guaiFENesin, 1,200 mg, Oral, BID  levothyroxine, 50 mcg, Oral, Q AM  metoprolol succinate XL, 50 mg, Oral, Daily  pantoprazole, 40 mg, Intravenous, Q AM  predniSONE, 5 mg, Oral, Daily  sodium chloride, 10 mL, Intravenous, Q12H  tacrolimus, 1 mg, Oral, BID      Continuous Infusions:dilTIAZem, 5-15 mg/hr, Last Rate: Stopped (05/19/24 0747)      PRN Meds:.  acetaminophen    albuterol    senna-docusate sodium **AND** polyethylene glycol **AND** bisacodyl **AND** bisacodyl    Calcium Replacement - Follow Nurse / BPA Driven Protocol    carboxymethylcellulose sod    diazePAM    Diclofenac Sodium    loperamide    Magnesium Standard Dose Replacement - Follow Nurse / BPA Driven Protocol    nitroglycerin    ondansetron    Phosphorus Replacement - Follow Nurse / BPA Driven Protocol    Potassium Replacement - Follow Nurse / BPA Driven Protocol    [COMPLETED] Insert  Peripheral IV **AND** sodium chloride    sodium chloride    sodium chloride     Assessment & Plan       Acute UTI  - IV rocephin  - blood cultures and urine cultures collected     Afib with RVR  - cardizem gtt  - cardiology following  - eliquis     Volume overload  - IV lasix  - echo     S/p fall  -   - CT head and cervical spine unremarkable     COPD  HTN  H/o lung cancer - will get CT chest  H/o kidney transplant - nephrology following.  Creat 0.86.  continue home meds  Weakness - PT/OT  Anemia - low iron.  Will start oral iron  HLD - hold statin because of elevated CK  hypothyroid     DVT prophy - eliquis  Stress ulcer prophy - PPI        Plan for disposition:BILL Lomeli MD  05/19/24  10:17 EDT

## 2024-05-19 NOTE — THERAPY EVALUATION
Patient Name: Jaja Carcamo  : 1945    MRN: [id number]                              Today's Date: 2024       Admit Date: 2024    Visit Dx:     ICD-10-CM ICD-9-CM   1. Acute UTI  N39.0 599.0   2. Generalized weakness  R53.1 780.79   3. Fall, initial encounter  W19.XXXA E888.9   4. Hypervolemia, unspecified hypervolemia type  E87.70 276.69   5. Leg swelling  M79.89 729.81     Patient Active Problem List   Diagnosis    Right wrist fracture    COVID-19 virus detected    COPD (chronic obstructive pulmonary disease)    NSTEMI (non-ST elevated myocardial infarction)    Cytokine release syndrome, grade 1    Acute UTI     Past Medical History:   Diagnosis Date    Cancer     lung    COPD (chronic obstructive pulmonary disease)     History of appendectomy     Hypertension     Renal disease     Hartman syndrome      Past Surgical History:   Procedure Laterality Date    BREAST SURGERY Left     cysts rmeoved    CARDIAC CATHETERIZATION Right 2022    Procedure: Left Heart Cath and coronary angiogram;  Surgeon: Jak Gavin MD;  Location: [facility] CATH INVASIVE LOCATION;  Service: Cardiology;  Laterality: Right;    CLOSED REDUCTION WRIST FRACTURE Right 3/1/2022    Procedure: WRIST CLOSED REDUCTION;  Surgeon: Gaudencio Townsend MD;  Location: [facility] MAIN OR;  Service: Orthopedics;  Laterality: Right;    CYSTOSCOPY      HYSTERECTOMY      LUNG LOBECTOMY Right     TRANSPLANTATION RENAL        General Information       Row Name 24          Physical Therapy Time and Intention    Document Type evaluation  -EL     Mode of Treatment physical therapy  -EL       Row Name 24          General Information    Patient Profile Reviewed yes  -EL     Prior Level of Function independent:;all household mobility;ADL's  But reports difficulty caring for self recently. Son also assists with needs such as shopping and taking out trash etc.  -EL     Existing Precautions/Restrictions fall;oxygen therapy device and L/min  4L  home O2  -EL     Barriers to Rehab medically complex;previous functional deficit  -       Row Name 05/19/24 1916          Living Environment    People in Home alone  -       Row Name 05/19/24 1916          Home Main Entrance    Number of Stairs, Main Entrance two  -       Row Name 05/19/24 1916          Cognition    Orientation Status (Cognition) oriented x 4  -       Row Name 05/19/24 1916          Safety Issues, Functional Mobility    Impairments Affecting Function (Mobility) balance;endurance/activity tolerance;strength;shortness of breath  -EL               User Key  (r) = Recorded By, (t) = Taken By, (c) = Cosigned By      Initials Name Provider Type    Max Jarvis, PT Physical Therapist                   Mobility       Row Name 05/19/24 1916          Bed Mobility    Bed Mobility supine-sit;sit-supine;scooting/bridging  -EL     Scooting/Bridging [place] (Bed Mobility) maximum assist (25% patient effort);2 person assist  -EL     Supine-Sit [place] (Bed Mobility) moderate assist (50% patient effort);2 person assist;verbal cues  -EL     Sit-Supine [place] (Bed Mobility) moderate assist (50% patient effort);2 person assist;verbal cues  -EL     Assistive Device (Bed Mobility) head of bed elevated;bed rails;draw sheet  -       Row Name 05/19/24 1916          Sit-Stand Transfer    Sit-Stand [place] (Transfers) moderate assist (50% patient effort)  -EL     Assistive Device (Sit-Stand Transfers) walker, front-wheeled  -EL     Comment, (Sit-Stand Transfer) requiring cueing for hand placement  -       Row Name 05/19/24 1916          Gait/Stairs (Locomotion)    [place] Level (Gait) minimum assist (75% patient effort)  -EL     Assistive Device (Gait) walker, front-wheeled  -EL     Distance in Feet (Gait) 20  -EL     Deviations/Abnormal Patterns (Gait) gait speed decreased  -EL     Bilateral Gait Deviations forward flexed posture  -EL     Comment, (Gait/Stairs) SOA following  -EL                User Key  (r) = Recorded By, (t) = Taken By, (c) = Cosigned By      Initials Name Provider Type    Max Jarvis, CLARKE Physical Therapist                   Obj/Interventions       Row Name 05/19/24 1917          Range of Motion Comprehensive    General Range of Motion bilateral lower extremity ROM WFL  -EL       Row Name 05/19/24 1917          Strength Comprehensive (MMT)    General Manual Muscle Testing (MMT) Assessment lower extremity strength deficits identified  -EL     Comment, General Manual Muscle Testing (MMT) Assessment BLE 3+/5 gross  -EL       [facility] Name 05/19/24 1917          Sensory Assessment (Somatosensory)    Sensory Assessment (Somatosensory) sensation intact  -EL               User Key  (r) = Recorded By, (t) = Taken By, (c) = Cosigned By      Initials Name Provider Type    Max Jarvis, PT Physical Therapist                   Goals/Plan       Row Name 05/19/24 1920          Bed Mobility Goal 1 (PT)    Activity/Assistive Device (Bed Mobility Goal 1, PT) bed mobility activities, all  -EL     [place] Level/Cues Needed (Bed Mobility Goal 1, PT) modified independence  -EL     Time Frame (Bed Mobility Goal 1, PT) long term goal (LTG);2 weeks  -EL       Row Name 05/19/24 1920          Transfer Goal 1 (PT)    Activity/Assistive Device (Transfer Goal 1, PT) transfers, all;walker, rolling  -EL     [place] Level/Cues Needed (Transfer Goal 1, PT) standby assist  -EL     Time Frame (Transfer Goal 1, PT) long term goal (LTG);2 weeks  -EL       Row Name 05/19/24 1920          Gait Training Goal 1 (PT)    Activity/Assistive Device (Gait Training Goal 1, PT) gait (walking locomotion);walker, rolling  -EL     [place] Level (Gait Training Goal 1, PT) standby assist  -EL     Distance (Gait Training Goal 1, PT) 100  -EL     Time Frame (Gait Training Goal 1, PT) long term goal (LTG);2 weeks  -EL       Row Name 05/19/24 1920          Therapy Assessment/Plan (PT)    Planned Therapy Interventions (PT)  neuromuscular re-education;balance training;bed mobility training;transfer training;gait training;patient/family education;strengthening  -EL               User Key  (r) = Recorded By, (t) = Taken By, (c) = Cosigned By      Initials Name Provider Type    Max Jarvis, PT Physical Therapist                   Clinical Impression       Row Name 05/19/24 1917          Pain    Pretreatment Pain Rating 0/10 - no pain  -EL     Posttreatment Pain Rating 0/10 - no pain  -EL       Row Name 05/19/24 1917          Plan of Care Review    Plan of Care Reviewed With patient  -EL     Outcome Evaluation Pt is a 78 YO F admitted s/p fall at home, with prolonged time spent on ground following. Pt reports she lives home alone and is generally Independent with ADLs, and is ambulating wiht RWx but admits to having difficutly caring for self recently. Pt reports she has a son who comes over to assist with certain things, such as shopping and trash, but overall she cares for self. Pt this date demonstrates impaired mobility, requiring MOD A x2 to come to sitting EOB, MOD A to stand and MIN A for short distance ambulation. Pt reports fatigue with mobility, but states she is improved from admission. Pt is unsafe for return home and remains high falls risk. Recommendaiton is SNF at d/c.  -EL       Row Name 05/19/24 1917          Therapy Assessment/Plan (PT)    Rehab Potential (PT) good, to achieve stated therapy goals  -EL     Criteria for Skilled Interventions Met (PT) yes  -EL     Therapy Frequency (PT) 5 times/wk  -EL     Predicted Duration of Therapy Intervention (PT) until d/c  -EL       Row Name 05/19/24 1917          Vital Signs    O2 Delivery Pre Treatment supplemental O2  -EL     O2 Delivery Intra Treatment supplemental O2  -EL     O2 Delivery Post Treatment supplemental O2  -EL     Pre Patient Position Supine  -EL     Intra Patient Position Standing  -EL     Post Patient Position Supine  -EL       Row Name 05/19/24 1917           Positioning and Restraints    Pre-Treatment Position in bed  -EL     Post Treatment Position bed  -EL     In Bed notified nsg;supine;encouraged to call for assist;call light within reach;exit alarm on  -EL               User Key  (r) = Recorded By, (t) = Taken By, (c) = Cosigned By      Initials Name Provider Type    Max Jarvis PT Physical Therapist                   Outcome Measures       Row Name 05/19/24 1921 05/19/24 0800       How much help from another person do you currently need...    Turning from your back to your side while in flat bed without using bedrails? 2  -EL 2  -LB    Moving from lying on back to sitting on the side of a flat bed without bedrails? 2  -EL 2  -LB    Moving to and from a bed to a chair (including a wheelchair)? 2  -EL 2  -LB    Standing up from a chair using your arms (e.g., wheelchair, bedside chair)? 2  -EL 2  -LB    Climbing 3-5 steps with a railing? 1  -EL 2  -LB    To walk in hospital room? 2  -EL 2  -LB    AM-PAC 6 Clicks Score (PT) 11  -EL 12  -LB    Highest Level of Mobility Goal 4 --> Transfer to chair/commode  -EL 4 --> Transfer to chair/commode  -LB      Row Name 05/19/24 1921 05/19/24 1419       Functional Assessment    Outcome Measure Options AM-PAC 6 Clicks Basic Mobility (PT)  -EL AM-PAC 6 Clicks Daily Activity (OT)  -MS              User Key  (r) = Recorded By, (t) = Taken By, (c) = Cosigned By      Initials Name Provider Type    Max Jarvis PT Physical Therapist    Tasneem Schroeder OT Occupational Therapist    Valeria Mendez, RN Registered Nurse                                 Physical Therapy Education       Title: PT OT SLP Therapies (Done)       Topic: Physical Therapy (Done)       Point: Mobility training (Done)       Learning Progress Summary             Patient Acceptance, E,TB, VU by  at 5/19/2024 1922                         Point: Precautions (Done)       Learning Progress Summary             Patient Acceptance, E,TB, VU by  at 5/19/2024 1922                                          User Key       Initials Effective Dates Name Provider Type Discipline    EL 06/23/20 -  Max Chong, PT Physical Therapist PT                  PT Recommendation and Plan  Planned Therapy Interventions (PT): neuromuscular re-education, balance training, bed mobility training, transfer training, gait training, patient/family education, strengthening  Plan of Care Reviewed With: patient  Outcome Evaluation: Pt is a 80 YO F admitted s/p fall at home, with prolonged time spent on ground following. Pt reports she lives home alone and is generally Independent with ADLs, and is ambulating wiht RWx but admits to having difficutly caring for self recently. Pt reports she has a son who comes over to assist with certain things, such as shopping and trash, but overall she cares for self. Pt this date demonstrates impaired mobility, requiring MOD A x2 to come to sitting EOB, MOD A to stand and MIN A for short distance ambulation. Pt reports fatigue with mobility, but states she is improved from admission. Pt is unsafe for return home and remains high falls risk. Recommendaiton is SNF at d/c.     Time Calculation:   PT Evaluation Complexity  History, PT Evaluation Complexity: 1-2 personal factors and/or comorbidities  Examination of Body Systems (PT Eval Complexity): total of 3 or more elements  Clinical Presentation (PT Evaluation Complexity): evolving  Clinical Decision Making (PT Evaluation Complexity): moderate complexity  Overall Complexity (PT Evaluation Complexity): moderate complexity     PT Charges       Row Name 05/19/24 1922             Time Calculation    Start Time 1059  -EL      Stop Time 1136  -EL      Time Calculation (min) 37 min  -EL      PT Received On 05/19/24  -EL      PT - Next Appointment 05/20/24  -EL      PT Goal Re-Cert Due Date 06/02/24  -EL         Time Calculation- PT    Total Timed Code Minutes- PT 10 minute(s)  -EL                User Key  (r) = Recorded By, (t)  = Taken By, (c) = Cosigned By      Initials Name Provider Type    Max Jarvis, PT Physical Therapist                  Therapy Charges for Today       Code Description Service Date Service Provider Modifiers Qty    85104101582 HC PT EVAL MOD COMPLEXITY 4 5/19/2024 Max Chong, PT GP 1    72038824589 HC GAIT TRAINING EA 15 MIN 5/19/2024 Max Chnog, PT GP 1            PT G-Codes  Outcome Measure Options: AM-PAC 6 Clicks Basic Mobility (PT)  AM-PAC 6 Clicks Score (PT): 11  AM-PAC 6 Clicks Score (OT): 18  PT Discharge Summary  Anticipated Discharge Disposition (PT): skilled nursing facility    Max Chong PT  5/19/2024

## 2024-05-19 NOTE — PLAN OF CARE
"Goal Outcome Evaluation:  Plan of Care Reviewed With: patient        Progress: no change  Outcome Evaluation: Pt is a 78 y/o F admitted to [facility] 5/18/24 with c/o dyspnea, s/p fall, was found on ground overnight. At baseline pt resides alone in [facility] with 2 LILLIAM. Pt typically (I) with ADLs, (I) with mobility with RW. Pt does not drive, son provides transportation, runs errands and and assist with household management. Pt reports 3 falls within last few weeks. Pt reports difficulty with short distance ambulation within home d/t \"smothering\" and dyspnea. This date pt A&Ox4 on 5L O2 and in supine upon arrival. Pt requires mod A x2 supine <>sit, comes to standing with mod A with RW and ambulates within room to [facility] with CGA-[facility]. Pt completes toileting task with SBA. Pt experiences fatigue after ADL task, demo poor activity tolerance. Pt is at significant risk for falls and is not safe to dc home at this time. Pt hesitant to dc to rehab, however will discuss with son. OT recommending SNF when medically appropriate for dc, pt likely to require assist with ADLs and mobility. OT will follow while admitted.      Anticipated Discharge Disposition (OT): skilled nursing facility                        "

## 2024-05-19 NOTE — PLAN OF CARE
Goal Outcome Evaluation:  Plan of Care Reviewed With: patient        Progress: improving  Outcome Evaluation: Patient transferred to the unit from [facility]. Cardizem drip was stopped at 7AM in [facility] with no PO cardizem order.Reaching out to Dr. loya for opinins  as pt still running A-fib on monitor and HR runs . waiting for answers back now. pt requre 3L/minNC which is patient baseline. Pain medication reordered by primary provider

## 2024-05-20 ENCOUNTER — APPOINTMENT (OUTPATIENT)
Dept: CARDIOLOGY | Facility: HOSPITAL | Age: 79
End: 2024-05-20
Payer: MEDICARE

## 2024-05-20 PROBLEM — K58.9 IRRITABLE BOWEL SYNDROME: Status: ACTIVE | Noted: 2019-04-15

## 2024-05-20 PROBLEM — S62.101D: Status: RESOLVED | Noted: 2022-03-03 | Resolved: 2024-05-20

## 2024-05-20 PROBLEM — I25.10 NONOBSTRUCTIVE ATHEROSCLEROSIS OF CORONARY ARTERY: Chronic | Status: ACTIVE | Noted: 2019-06-02

## 2024-05-20 PROBLEM — N19 RENAL FAILURE: Status: RESOLVED | Noted: 2024-05-20 | Resolved: 2024-05-20
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
## 2024-05-20 PROBLEM — K35.80 UNSPECIFIED ACUTE APPENDICITIS: Status: ACTIVE | Noted: 2022-03-03

## 2024-05-20 PROBLEM — C34.90 MALIGNANT NEOPLASM OF LUNG: Status: ACTIVE | Noted: 2017-08-10

## 2024-05-20 PROBLEM — J45.909 ASTHMA: Status: ACTIVE | Noted: 2017-08-10

## 2024-05-20 PROBLEM — I87.2 EDEMA OF BOTH LOWER EXTREMITIES DUE TO PERIPHERAL VENOUS INSUFFICIENCY: Status: ACTIVE | Noted: 2021-03-12

## 2024-05-20 PROBLEM — I25.10 NONOBSTRUCTIVE ATHEROSCLEROSIS OF CORONARY ARTERY: Status: ACTIVE | Noted: 2019-06-02

## 2024-05-20 PROBLEM — Z90.2 HISTORY OF LOBECTOMY OF LUNG: Status: ACTIVE | Noted: 2024-01-18

## 2024-05-20 PROBLEM — S52.613A FRACTURE OF ULNAR STYLOID: Status: ACTIVE | Noted: 2022-05-19

## 2024-05-20 PROBLEM — M31.31 WEGENER'S GRANULOMATOSIS WITH RENAL INVOLVEMENT: Status: ACTIVE | Noted: 2022-03-03

## 2024-05-20 PROBLEM — I87.2 EDEMA OF BOTH LOWER EXTREMITIES DUE TO PERIPHERAL VENOUS INSUFFICIENCY: Chronic | Status: ACTIVE | Noted: 2021-03-12

## 2024-05-20 PROBLEM — Z85.118 HISTORY OF LUNG CANCER: Status: ACTIVE | Noted: 2017-08-10

## 2024-05-20 PROBLEM — I48.91 ATRIAL FIBRILLATION WITH RAPID VENTRICULAR RESPONSE: Status: ACTIVE | Noted: 2024-05-20

## 2024-05-20 PROBLEM — J30.2 SEASONAL ALLERGIES: Status: ACTIVE | Noted: 2017-08-10

## 2024-05-20 PROBLEM — I21.4 NSTEMI (NON-ST ELEVATED MYOCARDIAL INFARCTION): Status: RESOLVED | Noted: 2022-08-11 | Resolved: 2024-05-20

## 2024-05-20 PROBLEM — E03.9 HYPOTHYROIDISM, UNSPECIFIED: Chronic | Status: ACTIVE | Noted: 2022-03-03

## 2024-05-20 PROBLEM — J44.9 COPD (CHRONIC OBSTRUCTIVE PULMONARY DISEASE): Chronic | Status: ACTIVE | Noted: 2022-08-11

## 2024-05-20 PROBLEM — Z94.0 HISTORY OF KIDNEY TRANSPLANT: Status: ACTIVE | Noted: 2017-06-30

## 2024-05-20 PROBLEM — F42.9 OBSESSIVE-COMPULSIVE DISORDER: Status: ACTIVE | Noted: 2019-04-15

## 2024-05-20 PROBLEM — E83.42 HYPOMAGNESEMIA: Status: RESOLVED | Noted: 2017-07-17 | Resolved: 2024-05-20

## 2024-05-20 PROBLEM — E87.70 VOLUME OVERLOAD: Status: ACTIVE | Noted: 2024-05-20

## 2024-05-20 PROBLEM — J96.11 CHRONIC HYPOXEMIC RESPIRATORY FAILURE: Status: ACTIVE | Noted: 2024-01-18

## 2024-05-20 PROBLEM — Z91.51 HISTORY OF SUICIDE ATTEMPT: Status: ACTIVE | Noted: 2024-05-20

## 2024-05-20 PROBLEM — S62.101A RIGHT WRIST FRACTURE: Status: RESOLVED | Noted: 2022-03-01 | Resolved: 2024-05-20

## 2024-05-20 PROBLEM — I07.1 TRICUSPID VALVE REGURGITATION: Status: ACTIVE | Noted: 2017-03-15

## 2024-05-20 PROBLEM — L97.909 ULCER OF LOWER EXTREMITY: Status: ACTIVE | Noted: 2021-08-30

## 2024-05-20 PROBLEM — S52.613A FRACTURE OF ULNAR STYLOID: Status: RESOLVED | Noted: 2022-05-19 | Resolved: 2024-05-20

## 2024-05-20 PROBLEM — I48.0 PAROXYSMAL ATRIAL FIBRILLATION: Chronic | Status: ACTIVE | Noted: 2017-05-11

## 2024-05-20 PROBLEM — K35.80 UNSPECIFIED ACUTE APPENDICITIS: Status: RESOLVED | Noted: 2022-03-03 | Resolved: 2024-05-20

## 2024-05-20 PROBLEM — E83.42 HYPOMAGNESEMIA: Status: ACTIVE | Noted: 2017-07-17

## 2024-05-20 PROBLEM — I48.92 ATRIAL FLUTTER: Status: RESOLVED | Noted: 2017-05-11 | Resolved: 2024-05-20

## 2024-05-20 PROBLEM — C34.90 MALIGNANT NEOPLASM OF LUNG: Status: RESOLVED | Noted: 2017-08-10 | Resolved: 2024-05-20

## 2024-05-20 PROBLEM — Z79.620 LONG TERM (CURRENT) USE OF IMMUNOSUPPRESSIVE BIOLOGIC: Status: ACTIVE | Noted: 2021-04-28

## 2024-05-20 PROBLEM — Z94.0 HISTORY OF KIDNEY TRANSPLANT: Status: ACTIVE | Noted: 2017-07-12

## 2024-05-20 PROBLEM — S62.101D: Status: ACTIVE | Noted: 2022-03-03

## 2024-05-20 PROBLEM — M19.90 OSTEOARTHRITIS: Status: ACTIVE | Noted: 2024-05-20

## 2024-05-20 PROBLEM — R09.02 HYPOXEMIA: Status: ACTIVE | Noted: 2024-05-20

## 2024-05-20 PROBLEM — K21.9 GASTROESOPHAGEAL REFLUX DISEASE: Status: ACTIVE | Noted: 2020-11-12

## 2024-05-20 PROBLEM — N19 RENAL FAILURE: Status: ACTIVE | Noted: 2024-05-20

## 2024-05-20 PROBLEM — J96.11 CHRONIC HYPOXEMIC RESPIRATORY FAILURE: Chronic | Status: ACTIVE | Noted: 2024-01-18

## 2024-05-20 PROBLEM — I47.10 PAROXYSMAL SUPRAVENTRICULAR TACHYCARDIA: Status: ACTIVE | Noted: 2017-05-11

## 2024-05-20 PROBLEM — I10 ESSENTIAL (PRIMARY) HYPERTENSION: Chronic | Status: ACTIVE | Noted: 2022-03-03

## 2024-05-20 PROBLEM — I38 VALVULAR HEART DISEASE: Chronic | Status: ACTIVE | Noted: 2024-05-20

## 2024-05-20 PROBLEM — L97.909 ULCER OF LOWER EXTREMITY: Status: RESOLVED | Noted: 2021-08-30 | Resolved: 2024-05-20

## 2024-05-20 PROBLEM — I48.92 ATRIAL FLUTTER: Status: ACTIVE | Noted: 2017-05-11

## 2024-05-20 PROBLEM — Z86.718 PERSONAL HISTORY OF OTHER VENOUS THROMBOSIS AND EMBOLISM: Status: ACTIVE | Noted: 2020-11-12

## 2024-05-20 PROBLEM — I10 ESSENTIAL (PRIMARY) HYPERTENSION: Status: ACTIVE | Noted: 2022-03-03

## 2024-05-20 PROBLEM — Z79.01 LONG TERM CURRENT USE OF ANTICOAGULANT THERAPY: Status: ACTIVE | Noted: 2024-01-18

## 2024-05-20 PROBLEM — C34.90 NON-SMALL CELL LUNG CANCER: Status: ACTIVE | Noted: 2017-08-10

## 2024-05-20 PROBLEM — K52.9 CHRONIC DIARRHEA: Status: ACTIVE | Noted: 2019-04-15

## 2024-05-20 PROBLEM — U07.1 COVID-19 VIRUS DETECTED: Status: RESOLVED | Noted: 2022-08-11 | Resolved: 2024-05-20

## 2024-05-20 PROBLEM — N95.1 MENOPAUSAL FLUSHING: Status: ACTIVE | Noted: 2021-08-30

## 2024-05-20 PROBLEM — H91.90 HEARING LOSS: Status: ACTIVE | Noted: 2017-08-10

## 2024-05-20 PROBLEM — M10.9 GOUT: Status: ACTIVE | Noted: 2017-08-10

## 2024-05-20 PROBLEM — E03.9 HYPOTHYROIDISM, UNSPECIFIED: Status: ACTIVE | Noted: 2022-03-03

## 2024-05-20 PROBLEM — Z87.11 PERSONAL HISTORY OF PEPTIC ULCER DISEASE: Status: ACTIVE | Noted: 2020-11-12

## 2024-05-20 PROBLEM — I48.0 PAROXYSMAL ATRIAL FIBRILLATION: Status: ACTIVE | Noted: 2017-05-11

## 2024-05-20 PROBLEM — M31.30 GRANULOMATOSIS WITH POLYANGIITIS: Status: ACTIVE | Noted: 2024-05-20

## 2024-05-20 PROBLEM — D89.831 CYTOKINE RELEASE SYNDROME, GRADE 1: Status: RESOLVED | Noted: 2022-08-16 | Resolved: 2024-05-20

## 2024-05-20 LAB
ALBUMIN SERPL-MCNC: 3.3 G/DL (ref 3.5–5.2)
ALBUMIN/GLOB SERPL: 1.6 G/DL
ALP SERPL-CCNC: 52 U/L (ref 39–117)
ALT SERPL W P-5'-P-CCNC: 9 U/L (ref 1–33)
ANION GAP SERPL CALCULATED.3IONS-SCNC: 9.2 MMOL/L (ref 5–15)
AST SERPL-CCNC: 16 U/L (ref 1–32)
BACTERIA SPEC AEROBE CULT: ABNORMAL
BASOPHILS # BLD AUTO: 0.02 10*3/MM3 (ref 0–0.2)
BASOPHILS NFR BLD AUTO: 0.3 % (ref 0–1.5)
BH CV ECHO MEAS - AO MAX PG: 17.6 MMHG
BH CV ECHO MEAS - AO MEAN PG: 10 MMHG
BH CV ECHO MEAS - AO V2 MAX: 210 CM/SEC
BH CV ECHO MEAS - AO V2 VTI: 31.1 CM
BH CV ECHO MEAS - AVA(I,D): 2.34 CM2
BH CV ECHO MEAS - EDV(CUBED): 110.6 ML
BH CV ECHO MEAS - EDV(MOD-SP4): 53.7 ML
BH CV ECHO MEAS - EF(MOD-BP): 55 %
BH CV ECHO MEAS - EF(MOD-SP4): 55.3 %
BH CV ECHO MEAS - ESV(CUBED): 24.4 ML
BH CV ECHO MEAS - ESV(MOD-SP4): 24 ML
BH CV ECHO MEAS - FS: 39.6 %
BH CV ECHO MEAS - IVS/LVPW: 1 CM
BH CV ECHO MEAS - IVSD: 1.2 CM
BH CV ECHO MEAS - LA DIMENSION: 4.9 CM
BH CV ECHO MEAS - LV MASS(C)D: 219.1 GRAMS
BH CV ECHO MEAS - LV MAX PG: 6 MMHG
BH CV ECHO MEAS - LV MEAN PG: 3 MMHG
BH CV ECHO MEAS - LV V1 MAX: 122 CM/SEC
BH CV ECHO MEAS - LV V1 VTI: 23.2 CM
BH CV ECHO MEAS - LVIDD: 4.8 CM
BH CV ECHO MEAS - LVIDS: 2.9 CM
BH CV ECHO MEAS - LVOT AREA: 3.1 CM2
BH CV ECHO MEAS - LVOT DIAM: 2 CM
BH CV ECHO MEAS - LVPWD: 1.2 CM
BH CV ECHO MEAS - MR MAX PG: 82.4 MMHG
BH CV ECHO MEAS - MR MAX VEL: 454 CM/SEC
BH CV ECHO MEAS - MV E MAX VEL: 185.7 CM/SEC
BH CV ECHO MEAS - MV MAX PG: 12 MMHG
BH CV ECHO MEAS - MV MEAN PG: 5 MMHG
BH CV ECHO MEAS - MV V2 VTI: 42.3 CM
BH CV ECHO MEAS - MVA(VTI): 1.72 CM2
BH CV ECHO MEAS - PA V2 MAX: 114 CM/SEC
BH CV ECHO MEAS - PI END-D VEL: 167 CM/SEC
BH CV ECHO MEAS - RAP SYSTOLE: 15 MMHG
BH CV ECHO MEAS - RV MAX PG: 1.14 MMHG
BH CV ECHO MEAS - RV V1 MAX: 53.5 CM/SEC
BH CV ECHO MEAS - RV V1 VTI: 6.9 CM
BH CV ECHO MEAS - RVSP: 77.1 MMHG
BH CV ECHO MEAS - SV(LVOT): 72.9 ML
BH CV ECHO MEAS - SV(MOD-SP4): 29.7 ML
BH CV ECHO MEAS - TAPSE (>1.6): 1.85 CM
BH CV ECHO MEAS - TR MAX PG: 62.1 MMHG
BH CV ECHO MEAS - TR MAX VEL: 394 CM/SEC
BILIRUB SERPL-MCNC: 0.6 MG/DL (ref 0–1.2)
BUN SERPL-MCNC: 15 MG/DL (ref 8–23)
BUN/CREAT SERPL: 20.3 (ref 7–25)
CALCIUM SPEC-SCNC: 9.1 MG/DL (ref 8.6–10.5)
CHLORIDE SERPL-SCNC: 104 MMOL/L (ref 98–107)
CO2 SERPL-SCNC: 28.8 MMOL/L (ref 22–29)
CREAT SERPL-MCNC: 0.74 MG/DL (ref 0.57–1)
DEPRECATED RDW RBC AUTO: 62.3 FL (ref 37–54)
EGFRCR SERPLBLD CKD-EPI 2021: 83.4 ML/MIN/1.73
EOSINOPHIL # BLD AUTO: 0.12 10*3/MM3 (ref 0–0.4)
EOSINOPHIL NFR BLD AUTO: 1.9 % (ref 0.3–6.2)
ERYTHROCYTE [DISTWIDTH] IN BLOOD BY AUTOMATED COUNT: 17.2 % (ref 12.3–15.4)
GLOBULIN UR ELPH-MCNC: 2.1 GM/DL
GLUCOSE SERPL-MCNC: 113 MG/DL (ref 65–99)
HCT VFR BLD AUTO: 31.4 % (ref 34–46.6)
HGB BLD-MCNC: 9.2 G/DL (ref 12–15.9)
IMM GRANULOCYTES # BLD AUTO: 0.02 10*3/MM3 (ref 0–0.05)
IMM GRANULOCYTES NFR BLD AUTO: 0.3 % (ref 0–0.5)
LYMPHOCYTES # BLD AUTO: 0.6 10*3/MM3 (ref 0.7–3.1)
LYMPHOCYTES NFR BLD AUTO: 9.7 % (ref 19.6–45.3)
MCH RBC QN AUTO: 29.1 PG (ref 26.6–33)
MCHC RBC AUTO-ENTMCNC: 29.3 G/DL (ref 31.5–35.7)
MCV RBC AUTO: 99.4 FL (ref 79–97)
MONOCYTES # BLD AUTO: 0.62 10*3/MM3 (ref 0.1–0.9)
MONOCYTES NFR BLD AUTO: 10 % (ref 5–12)
NEUTROPHILS NFR BLD AUTO: 4.8 10*3/MM3 (ref 1.7–7)
NEUTROPHILS NFR BLD AUTO: 77.8 % (ref 42.7–76)
NRBC BLD AUTO-RTO: 0 /100 WBC (ref 0–0.2)
PLATELET # BLD AUTO: 120 10*3/MM3 (ref 140–450)
PMV BLD AUTO: 10.3 FL (ref 6–12)
POTASSIUM SERPL-SCNC: 3.8 MMOL/L (ref 3.5–5.2)
PROT SERPL-MCNC: 5.4 G/DL (ref 6–8.5)
QTC INTERVAL: NORMAL MS
RBC # BLD AUTO: 3.16 10*6/MM3 (ref 3.77–5.28)
SINUS: 2.7 CM
SODIUM SERPL-SCNC: 142 MMOL/L (ref 136–145)
STJ: 2.3 CM
WBC NRBC COR # BLD AUTO: 6.18 10*3/MM3 (ref 3.4–10.8)

## 2024-05-20 PROCEDURE — 85025 COMPLETE CBC W/AUTO DIFF WBC: CPT | Performed by: NURSE PRACTITIONER

## 2024-05-20 PROCEDURE — 25010000002 FUROSEMIDE PER 20 MG: Performed by: INTERNAL MEDICINE

## 2024-05-20 PROCEDURE — 93306 TTE W/DOPPLER COMPLETE: CPT | Performed by: INTERNAL MEDICINE

## 2024-05-20 PROCEDURE — 25010000002 MEROPENEM PER 100 MG

## 2024-05-20 PROCEDURE — 97110 THERAPEUTIC EXERCISES: CPT

## 2024-05-20 PROCEDURE — 93010 ELECTROCARDIOGRAM REPORT: CPT | Performed by: INTERNAL MEDICINE

## 2024-05-20 PROCEDURE — 93005 ELECTROCARDIOGRAM TRACING: CPT | Performed by: NURSE PRACTITIONER

## 2024-05-20 PROCEDURE — 63710000001 TACROLIMUS PER 1 MG: Performed by: NURSE PRACTITIONER

## 2024-05-20 PROCEDURE — 25010000002 CEFTRIAXONE PER 250 MG: Performed by: FAMILY MEDICINE

## 2024-05-20 PROCEDURE — 80053 COMPREHEN METABOLIC PANEL: CPT | Performed by: NURSE PRACTITIONER

## 2024-05-20 PROCEDURE — 63710000001 PREDNISONE PER 5 MG: Performed by: NURSE PRACTITIONER

## 2024-05-20 PROCEDURE — 99232 SBSQ HOSP IP/OBS MODERATE 35: CPT | Performed by: INTERNAL MEDICINE

## 2024-05-20 PROCEDURE — 97116 GAIT TRAINING THERAPY: CPT

## 2024-05-20 PROCEDURE — 93306 TTE W/DOPPLER COMPLETE: CPT

## 2024-05-20 PROCEDURE — 97530 THERAPEUTIC ACTIVITIES: CPT

## 2024-05-20 PROCEDURE — 25010000002 ONDANSETRON PER 1 MG: Performed by: NURSE PRACTITIONER

## 2024-05-20 RX ORDER — METOLAZONE 5 MG/1
5 TABLET ORAL ONCE
Status: COMPLETED | OUTPATIENT
Start: 2024-05-20 | End: 2024-05-20

## 2024-05-20 RX ORDER — FUROSEMIDE 10 MG/ML
80 INJECTION INTRAMUSCULAR; INTRAVENOUS 2 TIMES DAILY
Status: DISCONTINUED | OUTPATIENT
Start: 2024-05-20 | End: 2024-05-21

## 2024-05-20 RX ORDER — MINERAL OIL/HYDROPHIL PETROLAT
1 OINTMENT (GRAM) TOPICAL 2 TIMES DAILY PRN
Status: DISCONTINUED | OUTPATIENT
Start: 2024-05-20 | End: 2024-05-22 | Stop reason: HOSPADM

## 2024-05-20 RX ADMIN — NYSTATIN: 100000 POWDER TOPICAL at 20:21

## 2024-05-20 RX ADMIN — HYDROCODONE BITARTRATE AND ACETAMINOPHEN 1 TABLET: 5; 325 TABLET ORAL at 03:29

## 2024-05-20 RX ADMIN — METOLAZONE 5 MG: 5 TABLET ORAL at 16:54

## 2024-05-20 RX ADMIN — TACROLIMUS 1 MG: 1 CAPSULE ORAL at 20:20

## 2024-05-20 RX ADMIN — DILTIAZEM HYDROCHLORIDE 90 MG: 30 TABLET, FILM COATED ORAL at 20:20

## 2024-05-20 RX ADMIN — PANTOPRAZOLE SODIUM 40 MG: 40 INJECTION, POWDER, FOR SOLUTION INTRAVENOUS at 05:18

## 2024-05-20 RX ADMIN — TACROLIMUS 1 MG: 1 CAPSULE ORAL at 07:26

## 2024-05-20 RX ADMIN — DILTIAZEM HYDROCHLORIDE 90 MG: 30 TABLET, FILM COATED ORAL at 05:18

## 2024-05-20 RX ADMIN — FERROUS SULFATE TAB EC 324 MG (65 MG FE EQUIVALENT) 324 MG: 324 (65 FE) TABLET DELAYED RESPONSE at 07:26

## 2024-05-20 RX ADMIN — HYDROCODONE BITARTRATE AND ACETAMINOPHEN 1 TABLET: 5; 325 TABLET ORAL at 13:36

## 2024-05-20 RX ADMIN — APIXABAN 5 MG: 5 TABLET, FILM COATED ORAL at 20:20

## 2024-05-20 RX ADMIN — FUROSEMIDE 80 MG: 10 INJECTION, SOLUTION INTRAMUSCULAR; INTRAVENOUS at 20:20

## 2024-05-20 RX ADMIN — DILTIAZEM HYDROCHLORIDE 90 MG: 30 TABLET, FILM COATED ORAL at 13:36

## 2024-05-20 RX ADMIN — MEROPENEM 500 MG: 500 INJECTION, POWDER, FOR SOLUTION INTRAVENOUS at 16:54

## 2024-05-20 RX ADMIN — MEROPENEM 500 MG: 500 INJECTION, POWDER, FOR SOLUTION INTRAVENOUS at 21:21

## 2024-05-20 RX ADMIN — GUAIFENESIN 1200 MG: 600 TABLET, EXTENDED RELEASE ORAL at 07:26

## 2024-05-20 RX ADMIN — GUAIFENESIN 1200 MG: 600 TABLET, EXTENDED RELEASE ORAL at 20:20

## 2024-05-20 RX ADMIN — APIXABAN 5 MG: 5 TABLET, FILM COATED ORAL at 07:38

## 2024-05-20 RX ADMIN — PREDNISONE 5 MG: 5 TABLET ORAL at 07:26

## 2024-05-20 RX ADMIN — CEFTRIAXONE 1000 MG: 1 INJECTION, POWDER, FOR SOLUTION INTRAMUSCULAR; INTRAVENOUS at 00:31

## 2024-05-20 RX ADMIN — DULOXETINE HYDROCHLORIDE 40 MG: 20 CAPSULE, DELAYED RELEASE ORAL at 07:26

## 2024-05-20 RX ADMIN — NYSTATIN: 100000 POWDER TOPICAL at 07:38

## 2024-05-20 RX ADMIN — Medication 10 ML: at 07:27

## 2024-05-20 RX ADMIN — LEVOTHYROXINE SODIUM 50 MCG: 0.05 TABLET ORAL at 05:18

## 2024-05-20 RX ADMIN — FUROSEMIDE 40 MG: 10 INJECTION, SOLUTION INTRAMUSCULAR; INTRAVENOUS at 07:26

## 2024-05-20 RX ADMIN — Medication 10 ML: at 20:21

## 2024-05-20 RX ADMIN — ONDANSETRON 4 MG: 2 INJECTION INTRAMUSCULAR; INTRAVENOUS at 13:36

## 2024-05-20 NOTE — TREATMENT PLAN
Subjective: Pt agreeable to therapeutic plan of care with encouragement.  Reports nausea which she states is normal for her in the morning.     Objective:     Bed mobility - Min-A  Transfers - Min-A, Mod-A, and with rolling walker  Ambulation - 2 feet CGA and with rolling walker    Therapeutic Exercise - Seated trunk TE EOB with intermittent UE support reaching outside OLMAN and across midline      Vitals: Desaturates into the 80's w/ ax and talking on 3L/min. Recovers quickly.       Assessment: Jaja Carcamo presents with functional mobility impairments which indicate the need for skilled intervention. Mobility has improved this date although continues to require encouragement, cues for technique, and moderate assist for mobility. Therefore patient is unsafe to return home and discharge and will require rehab. Tolerating session today without incident. Will continue to follow and progress as tolerated.

## 2024-05-20 NOTE — CONSULTS
PULMONARY CRITICAL CARE CONSULT NOTE      PATIENT IDENTIFICATION:  Name: Jaja Carcamo  MRN: [id number]  :  1945     Age: 79 y.o.  Sex: female        DATE OF CONSULTATION:  2024  PRIMARY CARE PHYSICIAN    Jonathan Luna APRN                  CHIEF COMPLAINT: Abnormal CAT scan    History of Present Illness:   Jaja Carcamo is a 79 y.o. female known history of lung cancer neuroendocrine diagnosed in 2016 s/p resection, chronic obstructive airway disease, chronic respiratory failure on home oxygen 4 L, presented to the hospital with worsening shortness of breath and found the patient to be in A-fib with RVR      Review of Systems:   Constitutional: As above   Eyes: negative   ENT/oropharynx: negative   Cardiovascular: negative   Respiratory: As above   Gastrointestinal: negative   Genitourinary: negative   Neurological: negative   Musculoskeletal: negative   Integument/breast: negative   Endocrine: negative   Allergic/Immunologic: negative     Past Medical History:  Past Medical History:   Diagnosis Date    Allergic rhinitis 2015    Asthma 08/10/2017    Atrial flutter 2017    Chronic diarrhea 04/15/2019    Chronic hypoxemic respiratory failure 2024    Chronic pain 2015    COPD (chronic obstructive pulmonary disease)     COVID-19 virus detected 2022    Cytokine release syndrome, grade 1 2022    Edema of both lower extremities due to peripheral venous insufficiency 2021    ESRD on hemodialysis 2013    Essential (primary) hypertension 2022    Fracture of ulnar styloid 2022    Fracture of unspecified carpal bone, right wrist, subsequent encounter for fracture with routine healing 2022    Gastroesophageal reflux disease 2020    Gout 08/10/2017    Hearing loss 08/10/2017    History of appendectomy     History of DVT (deep vein thrombosis) 2020    History of kidney transplant 2017    History of lobectomy of lung 2024     03/08/2016:  RIGHT Lower Lobe Mass--> Right Video-assisted thoracoscopy with a moderate-to-large wedge resection of the RLL (by Dr. Haris Mcconnell @ [facility])--> Poorly differentiated carcinoma of the RLL.      History of repair of hip joint 05/21/2013    History of suicide attempt 05/20/2024    Hyperlipidemia 08/11/2014    Hypothyroidism, unspecified 03/03/2022    Infection due to extended spectrum beta lactamase (ESBL) producing bacteria 03/11/2016    No A2K system hx. +ESBL E coli urine on 3/11/16.      Irritable bowel syndrome 04/15/2019    Long term (current) use of immunosuppressive biologic 04/28/2021    Long term current use of anticoagulant therapy 01/18/2024    Malignant neoplasm of lung 08/10/2017    Menopausal flushing 08/30/2021    Mitral valve regurgitation 07/06/2015    Mixed anxiety and depressive disorder 04/30/2015    Non-small cell lung cancer 08/10/2017    Nonobstructive atherosclerosis of coronary artery 06/02/2019 08/12/2022: CATH: Prashant: NSTEMI assoc with Covid-19: LM:-nl;  LAD: diffuse, Ca++; 30%; CIRC: Dominant. Normal; RCA: small; 50% diffuse, proximal and mid-segment.      NSTEMI (non-ST elevated myocardial infarction) 08/11/2022    Obsessive-compulsive disorder 04/15/2019    Osteoarthritis 05/20/2024    Paroxysmal atrial fibrillation 05/11/2017    Paroxysmal supraventricular tachycardia 05/11/2017    Peripheral neuropathy 08/11/2014    Personal history of peptic ulcer disease 11/12/2020    Postoperative anemia due to acute blood loss 05/22/2013    Right wrist fracture 03/01/2022    Seasonal allergies 08/10/2017    Secondary hyperparathyroidism of renal origin 09/14/2015    Tricuspid valve regurgitation 03/15/2017    Ulcer of lower extremity 08/30/2021    Unspecified acute appendicitis 03/03/2022    Valvular heart disease 05/20/2024    Wegener's granulomatosis with renal involvement 03/03/2022       Past Surgical History:  Past Surgical History:   Procedure Laterality  Date    APPENDECTOMY      BREAST SURGERY Left     cysts rmeoved    CARDIAC CATHETERIZATION Right 08/12/2022    Procedure: Left Heart Cath and coronary angiogram;  Surgeon: Jak Gavin MD;  Location: [facility] CATH INVASIVE LOCATION;  Service: Cardiology;  Laterality: Right;    CLOSED REDUCTION WRIST FRACTURE Right 03/01/2022    Procedure: WRIST CLOSED REDUCTION;  Surgeon: Gaudencio Townsend MD;  Location: [facility] MAIN OR;  Service: Orthopedics;  Laterality: Right;    CYSTOSCOPY      HIP ARTHROPLASTY      HYSTERECTOMY      LUNG LOBECTOMY Right     TRANSPLANTATION RENAL          Family History:  Family History   Problem Relation Age of Onset    No Known Problems Mother     No Known Problems Father         Social History:   Social History     Tobacco Use    Smoking status: Former    Smokeless tobacco: Never   Substance Use Topics    Alcohol use: Never        Allergies:  Allergies   Allergen Reactions    Zolpidem Other (See Comments), Unknown (See Comments) and Unknown - High Severity     KEPT HER AWAKE  KEPT HER AWAKE  KEPT HER AWAKE  KEPT HER AWAKE         Home Meds:  Medications Prior to Admission   Medication Sig Dispense Refill Last Dose    acetaminophen (Tylenol) 325 MG tablet Take 2 tablets by mouth Every 4 (Four) Hours As Needed for Fever or Mild Pain.       apixaban (ELIQUIS) 5 MG tablet tablet Take 1 tablet by mouth Every 12 (Twelve) Hours. 60 tablet 0     albuterol sulfate  (90 Base) MCG/ACT inhaler Inhale 2 puffs Every 6 (Six) Hours As Needed for Wheezing.       Carboxymethylcellulose Sodium (DRY EYE RELIEF OP) Apply 2 drops to eye(s) as directed by provider 2 (Two) Times a Day As Needed (dry eyes).       colestipol (COLESTID) 1 g tablet Take 1 tablet by mouth Daily.       diazePAM (VALIUM) 5 MG tablet Take 5 mg by mouth 2 (Two) Times a Day As Needed for Anxiety.       Diclofenac Sodium (Aspercreme Arthritis Pain) 1 % gel gel Apply 4 g topically to the appropriate area as directed 2 (Two) Times a Day As Needed  "(pain).       DULoxetine HCl 40 MG capsule delayed-release particles Take 1 capsule by mouth Daily.       furosemide (LASIX) 40 MG tablet Take 40 mg by mouth 2 (Two) Times a Day.       guaiFENesin (Mucinex) 600 MG 12 hr tablet Take 1,200 mg by mouth 2 (Two) Times a Day.       levothyroxine (SYNTHROID, LEVOTHROID) 50 MCG tablet Take 1 tablet by mouth See Admin Instructions.       loperamide (IMODIUM) 2 MG capsule Take 2 mg by mouth 4 (Four) Times a Day As Needed for Diarrhea.       metoprolol succinate XL (TOPROL-XL) 50 MG 24 hr tablet Take 50 mg by mouth Daily.       Jigzm-Fdbon-Ehadmal-Pramoxine (Neosporin + Pain Relief Max St) 1 % ointment Apply 1 application topically to the appropriate area as directed 2 (Two) Times a Day As Needed (sores).       O2 (OXYGEN) Inhale 2 L/min Continuous.       potassium chloride (K-DUR,KLOR-CON) 20 MEQ CR tablet Take 1 tablet by mouth Daily.       predniSONE (DELTASONE) 5 MG tablet Take 5 mg by mouth Daily.       Salicylic Acid-Urea (KERASAL EX) Apply 1 application topically to the appropriate area as directed 2 (Two) Times a Day As Needed (dry feet).       simvastatin (ZOCOR) 20 MG tablet Take 20 mg by mouth Every Night.       tacrolimus (PROGRAF) 1 MG capsule Take 1 mg by mouth 2 (Two) Times a Day.       Tolnaftate 1 % gel Apply 1 application topically to the appropriate area as directed 2 (Two) Times a Day As Needed (ingrown toenail).          Objective:  tMax 24 hrs: Temp (24hrs), Av.1 °F (36.7 °C), Min:97.5 °F (36.4 °C), Max:99 °F (37.2 °C)      Vitals Ranges:   Temp:  [97.5 °F (36.4 °C)-99 °F (37.2 °C)] 97.5 °F (36.4 °C)  Heart Rate:  [] 85  Resp:  [22-28] 22  BP: (117-149)/(54-84) 132/65    Intake and Output Last 3 Shifts:   I/O last 3 completed shifts:  In: 888 [P.O.:720; I.V.:168]  Out: 2100 [Urine:2100]    Exam:  /65 (BP Location: Left arm, Patient Position: Lying)   Pulse 85   Temp 97.5 °F (36.4 °C) (Axillary)   Resp 22   Ht 167.6 cm (66\")   Wt 92.3 " kg (203 lb 7.8 oz)   SpO2 94%   BMI 32.84 kg/m²       05/19/24  0500 05/20/24  0413   Weight: 89.9 kg (198 lb 3.1 oz) 92.3 kg (203 lb 7.8 oz)     General Appearance:      HEENT:  Normocephalic, without obvious abnormality, atraumatic. Conjunctivae/corneas clear.  Normal external ear canals. Nares normal, no drainage.  Neck:  Supple, symmetrical, trachea midline. No JVD.  Lungs /Chest wall:   Bilateral basal rhonchi, respirations unlabored, symmetrical wall movement.     Heart:  Regular rate and rhythm, systolic murmur PMI left sternal border  Abdomen: Soft, nontender, no masses, no organomegaly.    Extremities: Trace edema, no clubbing or cyanosis        Data Review:  All labs (24hrs):   Recent Results (from the past 24 hour(s))   Comprehensive Metabolic Panel    Collection Time: 05/20/24  3:37 AM    Specimen: Blood   Result Value Ref Range    Glucose 113 (H) 65 - 99 mg/dL    BUN 15 8 - 23 mg/dL    Creatinine 0.74 0.57 - 1.00 mg/dL    Sodium 142 136 - 145 mmol/L    Potassium 3.8 3.5 - 5.2 mmol/L    Chloride 104 98 - 107 mmol/L    CO2 28.8 22.0 - 29.0 mmol/L    Calcium 9.1 8.6 - 10.5 mg/dL    Total Protein 5.4 (L) 6.0 - 8.5 g/dL    Albumin 3.3 (L) 3.5 - 5.2 g/dL    ALT (SGPT) 9 1 - 33 U/L    AST (SGOT) 16 1 - 32 U/L    Alkaline Phosphatase 52 39 - 117 U/L    Total Bilirubin 0.6 0.0 - 1.2 mg/dL    Globulin 2.1 gm/dL    A/G Ratio 1.6 g/dL    BUN/Creatinine Ratio 20.3 7.0 - 25.0    Anion Gap 9.2 5.0 - 15.0 mmol/L    eGFR 83.4 >60.0 mL/min/1.73   CBC Auto Differential    Collection Time: 05/20/24  3:37 AM    Specimen: Blood   Result Value Ref Range    WBC 6.18 3.40 - 10.80 10*3/mm3    RBC 3.16 (L) 3.77 - 5.28 10*6/mm3    Hemoglobin 9.2 (L) 12.0 - 15.9 g/dL    Hematocrit 31.4 (L) 34.0 - 46.6 %    MCV 99.4 (H) 79.0 - 97.0 fL    MCH 29.1 26.6 - 33.0 pg    MCHC 29.3 (L) 31.5 - 35.7 g/dL    RDW 17.2 (H) 12.3 - 15.4 %    RDW-SD 62.3 (H) 37.0 - 54.0 fl    MPV 10.3 6.0 - 12.0 fL    Platelets 120 (L) 140 - 450 10*3/mm3     Neutrophil % 77.8 (H) 42.7 - 76.0 %    Lymphocyte % 9.7 (L) 19.6 - 45.3 %    Monocyte % 10.0 5.0 - 12.0 %    Eosinophil % 1.9 0.3 - 6.2 %    Basophil % 0.3 0.0 - 1.5 %    Immature Grans % 0.3 0.0 - 0.5 %    Neutrophils, Absolute 4.80 1.70 - 7.00 10*3/mm3    Lymphocytes, Absolute 0.60 (L) 0.70 - 3.10 10*3/mm3    Monocytes, Absolute 0.62 0.10 - 0.90 10*3/mm3    Eosinophils, Absolute 0.12 0.00 - 0.40 10*3/mm3    Basophils, Absolute 0.02 0.00 - 0.20 10*3/mm3    Immature Grans, Absolute 0.02 0.00 - 0.05 10*3/mm3    nRBC 0.0 0.0 - 0.2 /100 WBC   ECG 12 Lead Bradycardia    Collection Time: 05/20/24  9:37 AM   Result Value Ref Range    QTC Interval  ms        Imaging:  CT Chest Without Contrast Diagnostic    Result Date: 5/19/2024  CT CHEST WO CONTRAST DIAGNOSTIC Date of Exam: 5/19/2024 2:11 PM EDT Indication: h/o lung cancer. Comparison: 1/22/2024 Technique: Axial CT images were obtained of the chest without contrast administration.  Sagittal and coronal reconstructions were performed.  Automated exposure control and iterative reconstruction methods were used. Findings: Emphysema. Left upper lobe calcified granuloma. Small bilateral effusions. Cardiomegaly. Postsurgical changes within the right lower lobe. There has been slight interval enlargement of the soft tissue surrounding the postsurgical changes in the  right lower lobe of unknown relation to the patient's history of malignancy or the presence of pleural effusions. On axial image #56 this measures 2.5 cm x 2.7 cm compared with 2.3 cm x 2.3 cm previously representing minimal interval enlargement. No pneumothorax. Atheromatous disease of the coronary vessels. No adenopathy. The main pulmonary artery measures 4.1 cm in diameter unchanged compared with the prior study consistent with pulmonary arterial hypertension. 2.3 cm x 1.7 cm stable left adrenal nodule. Bilateral renal atrophy. Right renal cyst. Cholelithiasis. Healed right-sided rib fractures. Inflammation of  the right lateral chest wall.     Minimal interval enlargement of the tissue surrounding the postsurgical changes associated with small bilateral pleural effusions. Enlargement of the main pulmonary artery suggesting pulmonary arterial hypertension. Electronically Signed: Adalid Silverio MD  5/19/2024 7:58 PM EDT  Workstation ID: [id number]    XR Chest 1 View    Result Date: 5/17/2024  XR CHEST 1 VW Date of Exam: 5/17/2024 10:38 PM EDT Indication: fall Comparison: 8/15/2022 Findings: Small bilateral pleural effusions with cardiomegaly and vascular congestion. No pneumothorax. Calcification of the aortic arch. The clavicles are intact. No rib fractures. The visualized upper abdomen is normal.     Small bilateral pleural effusions with vascular congestion. Electronically Signed: Adalid Silverio MD  5/17/2024 10:50 PM EDT  Workstation ID: [id number]    CT Head Without Contrast    Result Date: 5/17/2024  CT HEAD WO CONTRAST Date of Exam: 5/17/2024 10:27 PM EDT Indication: fall. Headache Comparison: None available. Technique: Axial CT images were obtained of the head without contrast administration.  Coronal reconstructions were performed.  Automated exposure control and iterative reconstruction methods were used. FINDINGS: Brain/Ventricles: Mild diffuse atrophy is noted not unexpected for age. White matter changes compatible small vessel ischemic disease are noted in this age group. No acute intracranial hemorrhage or mass effect. Orbits: The visualized portion of the orbits demonstrate no acute abnormality. Sinuses:The visualized paranasal sinuses and mastoid air cells demonstrate no acute abnormality. Soft Tissues/Skull: No acute abnormality of the visualized skull or soft tissues.     No acute intracranial abnormality. Electronically Signed: Davey Wells MD  5/17/2024 10:45 PM EDT  Workstation ID: [id number]    CT Cervical Spine Without Contrast    Result Date: 5/17/2024  CT CERVICAL SPINE WO CONTRAST Date of Exam: 5/17/2024  10:27 PM EDT Indication: fall, head injury. Comparison: None available. Technique: Axial CT images were obtained of the cervical spine without contrast administration.  Sagittal and coronal reconstructions were performed.  Automated exposure control and iterative reconstruction methods were used. Findings: Exam is somewhat limited by motion artifact. There is no evidence of fracture. The craniocervical junction is intact. There are moderate atlantoaxial joint degenerative changes.  Scattered endplate osteophyte formations with multilevel facet arthropathy. Degenerative disc disease with disc height loss at multiple levels. There is at least mild canal narrowing at C5-6 and C6-7. Varying multilevel neural foraminal narrowing, at least moderate bilaterally at C5-6 and C6-7. The paraspinal soft tissues show no acute abnormality.     Impression: No evidence of fracture. Degenerative changes of the cervical spine, greatest at C5-6 and C6-7 as above. Electronically Signed: Spencer No MD  5/17/2024 10:44 PM EDT  Workstation ID: [id number]       Current:  apixaban, 5 mg, Oral, Q12H  cefTRIAXone, 1,000 mg, Intravenous, Q24H  dilTIAZem, 90 mg, Oral, Q8H  DULoxetine, 40 mg, Oral, Daily  ferrous sulfate, 324 mg, Oral, Daily With Breakfast  furosemide, 80 mg, Intravenous, BID  guaiFENesin, 1,200 mg, Oral, BID  levothyroxine, 50 mcg, Oral, Q AM  metOLazone, 5 mg, Oral, Once  metoprolol succinate XL, 50 mg, Oral, Daily  nystatin, , Topical, Q12H  pantoprazole, 40 mg, Intravenous, Q AM  predniSONE, 5 mg, Oral, Daily  sodium chloride, 10 mL, Intravenous, Q12H  tacrolimus, 1 mg, Oral, BID        Infusion:        PRN:    acetaminophen    albuterol    senna-docusate sodium **AND** polyethylene glycol **AND** bisacodyl **AND** bisacodyl    Calcium Replacement - Follow Nurse / BPA Driven Protocol    carboxymethylcellulose sod    diazePAM    Diclofenac Sodium    HYDROcodone-acetaminophen    loperamide    Magnesium Standard Dose  Replacement - Follow Nurse / BPA Driven Protocol    mineral oil-hydrophilic petrolatum    nitroglycerin    ondansetron    Phosphorus Replacement - Follow Nurse / BPA Driven Protocol    Potassium Replacement - Follow Nurse / BPA Driven Protocol    [COMPLETED] Insert Peripheral IV **AND** sodium chloride    sodium chloride    sodium chloride    ASSESSMENT:      COPD (chronic obstructive pulmonary disease)    Atrial fibrillation with rapid ventricular response  Has history of neuroendocrine carcinoma of the lung status post wedge resection  Abnormal CAT scan  Volume overload     Acute UTI    Hypothyroidism, unspecified    Hyperlipidemia    History of lobectomy of lung    History of kidney transplant    History of lung cancer to get medical records from oncologist    Nonobstructive atherosclerosis of coronary artery    History of DVT (deep vein thrombosis)    Pulmonary hypertension    Chronic hypoxemic respiratory failure    Essential (primary) hypertension    Paroxysmal atrial fibrillation    Valvular heart disease         PLAN:  20 for the patient to have PET scan as outpatient to rule out any recurrence encouraged use I-S flutter valve  Antibiotics  Bronchodilator  Inhaled corticosteroids  Electrolytes/ glycemic control  DVT and GI prophylaxis.  Total Critical care time in direct medical management (   ) minutes, This time specifically excludes time spent performing procedures.       Barry Mckinley MD. D, ABSM.  5/20/2024  14:28 EDT

## 2024-05-20 NOTE — CASE MANAGEMENT/SOCIAL WORK
Discharge Planning Assessment   [facility]     Patient Name: Jaja Carcamo  MRN: [id number]  Today's Date: 5/20/2024    Admit Date: 5/17/2024    Plan: D/C Plan: Home with H.H. Agency TBD. Needs provider for her ins. (Refuses skilled placement). Transport by family car   Discharge Needs Assessment       Row Name 05/20/24 1421       Living Environment    People in Home alone    Current Living Arrangements home    Potentially Unsafe Housing Conditions none    In the past 12 months has the electric, gas, oil, or water company threatened to shut off services in your home? No    Primary Care Provided by self    Provides Primary Care For no one, unable/limited ability to care for self    Quality of Family Relationships unable to assess    Able to Return to Prior Arrangements other (see comments)  SNF rehab recommended       Resource/Environmental Concerns    Resource/Environmental Concerns none    Transportation Concerns none       Transportation Needs    In the past 12 months, has lack of transportation kept you from medical appointments or from getting medications? no    In the past 12 months, has lack of transportation kept you from meetings, work, or from getting things needed for daily living? No       Food Insecurity    Within the past 12 months, you worried that your food would run out before you got the money to buy more. Never true    Within the past 12 months, the food you bought just didn't last and you didn't have money to get more. Never true       Transition Planning    Patient/Family Anticipates Transition to home    Patient/Family Anticipated Services at Transition none    Transportation Anticipated family or friend will provide       Discharge Needs Assessment    Readmission Within the Last 30 Days no previous admission in last 30 days    Equipment Currently Used at Home walker, rolling;oxygen    Concerns to be Addressed discharge planning    Anticipated Changes Related to Illness none    Equipment Needed  After Discharge none    Outpatient/Agency/Support Group Needs skilled nursing facility;homecare agency    Provided Post Acute Provider List? N/A    Patient's Choice of Community Agency(s) Wanted to discuss with son.    Current Discharge Risk lives alone                   Discharge Plan       Row Name 05/20/24 [id number]       Plan    Plan D/C Plan: Home with H.H. Agency TBD. Needs provider for her ins. (Refuses skilled placement). Transport by family car    Patient/Family in Agreement with Plan yes    Plan Comments Spoke with pt over the phone.  Reviewed IMM. Expressed understanding. Copy to be taken to room . Verified PCP and Pharm. Pt interested in [facility] service. Lives alone at a mod. in level. Is on 2l 02 which is supplied by [employer]. Used Rwx for mobility. Son lives 1-2 miles away and frequently checks on her.  Therapy is recommending skilled rehab, but she declines even after speaking to son. She has been in 2 facilities, and had bad experiences. He is considering getting someone to stay with her some. Would agree to consider H.H options. Will offer in network providers.  Barrier to D/C: Fluid Overload-IV lasix BID; UTI-E.Coli. ID consulted                  Continued Care and Services - Admitted Since 5/17/2024    No active coordination exists for this encounter.       Expected Discharge Date and Time       Expected Discharge Date Expected Discharge Time    May 21, 2024            Demographic Summary       Row Name 05/20/24 1420       General Information    Admission Type inpatient    Arrived From emergency department    Required Notices Provided Important Message from Medicare    Referral Source admission list    Reason for Consult discharge planning    Preferred Language English                   Functional Status       Row Name 05/20/24 1420       Functional Status    Usual Activity Tolerance moderate    Current Activity Tolerance moderate       Functional Status, IADL    Medications independent    Meal Preparation  independent    Housekeeping independent    Laundry independent    Shopping assistive person       Mental Status    General Appearance WDL WDL       Mental Status Summary    Recent Changes in Mental Status/Cognitive Functioning no changes       Employment/    Employment Status retired    Current or Previous Occupation not applicable                   Psychosocial    No documentation.                       Claire Castro RN

## 2024-05-20 NOTE — PLAN OF CARE
Goal Outcome Evaluation:      Slept at short intervals. O2 at 3L in use. PRN oral med given for c/o generalized pain. External cath in use. Will continue to monitor.

## 2024-05-20 NOTE — PROGRESS NOTES
LOS: 2 days   Patient Care Team:  Jonathan Luna APRN as PCP - General (Family Medicine)  Jak Gavin MD as Cardiologist (Cardiology)    Subjective     Interval History:     Patient Complaints: pt talking on phone during my exam.  Poor historian     History taken from: patient    Review of Systems   Constitutional:  Positive for activity change and fatigue.   Respiratory:  Positive for shortness of breath.    Cardiovascular:  Positive for leg swelling. Negative for chest pain.   Gastrointestinal: Negative.    Genitourinary: Negative.  Positive for frequency.   Musculoskeletal:  Positive for arthralgias.   Neurological:  Positive for weakness.           Objective     Vital Signs  Temp:  [97.5 °F (36.4 °C)-99 °F (37.2 °C)] 97.5 °F (36.4 °C)  Heart Rate:  [] 85  Resp:  [22-28] 22  BP: (117-149)/(54-84) 132/65    Physical Exam:     General Appearance:    Alert, cooperative, in no acute distress, ill but not toxic   Head:    Normocephalic, without obvious abnormality, atraumatic   Eyes:            Lids and lashes normal, conjunctivae and sclerae normal, no   icterus, no pallor, corneas clear, PERRLA   Ears:    Ears appear intact with no abnormalities noted   Throat:   No oral lesions, no thrush, oral mucosa moist   Neck:   No adenopathy, supple, trachea midline, no thyromegaly, no   carotid bruit, no JVD   Lungs:     Clear to auscultation,respirations regular, even and                  unlabored    Heart:    Irreg irreg   Chest Wall:    No abnormalities observed   Abdomen:     Normal bowel sounds, no masses, no organomegaly, soft        non-tender, non-distended, no guarding, no rebound                tenderness   Extremities:   Moves all extremities well, 2+ edema, no cyanosis, no             redness   Pulses:   Pulses palpable and equal bilaterally   Skin:   No bleeding, bruising or rash   Lymph nodes:   No palpable adenopathy   Neurologic:   Cranial nerves 2 - 12 grossly intact, sensation intact, DTR        present and equal bilaterally        Results Review:    Lab Results (last 24 hours)       Procedure Component Value Units Date/Time    Urine Culture - Urine, Straight Cath [[id number]]  (Abnormal)  (Susceptibility) Collected: 05/17/24 2245    Specimen: Urine from Straight Cath Updated: 05/20/24 1047     Urine Culture >100,000 CFU/mL Escherichia coli ESBL     Comment:   Consider infectious disease consult.  Susceptibility results may not correlate to clinical outcomes.       Narrative:      Colonization of the urinary tract without infection is common. Treatment is discouraged unless the patient is symptomatic, pregnant, or undergoing an invasive urologic procedure.  Recent outcomes data supports the use of pip/tazo in the treatment of susceptible ESBL infections for uncomplicated UTI. Consider use of pip/tazo as a carbapenem-sparing regimen in applicable patients.    Susceptibility        Escherichia coli ESBL      ROBERT      Ertapenem Susceptible      Gentamicin Susceptible      Levofloxacin Resistant      Meropenem Susceptible      Nitrofurantoin Susceptible      Piperacillin + Tazobactam Susceptible      Trimethoprim + Sulfamethoxazole Resistant                           Comprehensive Metabolic Panel [[id number]]  (Abnormal) Collected: 05/20/24 0337    Specimen: Blood Updated: 05/20/24 0457     Glucose 113 mg/dL      BUN 15 mg/dL      Creatinine 0.74 mg/dL      Sodium 142 mmol/L      Potassium 3.8 mmol/L      Chloride 104 mmol/L      CO2 28.8 mmol/L      Calcium 9.1 mg/dL      Total Protein 5.4 g/dL      Albumin 3.3 g/dL      ALT (SGPT) 9 U/L      AST (SGOT) 16 U/L      Alkaline Phosphatase 52 U/L      Total Bilirubin 0.6 mg/dL      Globulin 2.1 gm/dL      A/G Ratio 1.6 g/dL      BUN/Creatinine Ratio 20.3     Anion Gap 9.2 mmol/L      eGFR 83.4 mL/min/1.73     Narrative:      GFR Normal >60  Chronic Kidney Disease <60  Kidney Failure <15    The GFR formula is only valid for adults with stable renal function between  ages 18 and 70.    CBC & Differential [[id number]]  (Abnormal) Collected: 05/20/24 0337    Specimen: Blood Updated: 05/20/24 0435    Narrative:      The following orders were created for panel order CBC & Differential.  Procedure                               Abnormality         Status                     ---------                               -----------         ------                     CBC Auto Differential[[id number]]        Abnormal            Final result                 Please view results for these tests on the individual orders.    CBC Auto Differential [[id number]]  (Abnormal) Collected: 05/20/24 0337    Specimen: Blood Updated: 05/20/24 0435     WBC 6.18 10*3/mm3      RBC 3.16 10*6/mm3      Hemoglobin 9.2 g/dL      Hematocrit 31.4 %      MCV 99.4 fL      MCH 29.1 pg      MCHC 29.3 g/dL      RDW 17.2 %      RDW-SD 62.3 fl      MPV 10.3 fL      Platelets 120 10*3/mm3      Neutrophil % 77.8 %      Lymphocyte % 9.7 %      Monocyte % 10.0 %      Eosinophil % 1.9 %      Basophil % 0.3 %      Immature Grans % 0.3 %      Neutrophils, Absolute 4.80 10*3/mm3      Lymphocytes, Absolute 0.60 10*3/mm3      Monocytes, Absolute 0.62 10*3/mm3      Eosinophils, Absolute 0.12 10*3/mm3      Basophils, Absolute 0.02 10*3/mm3      Immature Grans, Absolute 0.02 10*3/mm3      nRBC 0.0 /100 WBC     Blood Culture - Blood, Arm, Left [[id number]]  (Normal) Collected: 05/18/24 0057    Specimen: Blood from Arm, Left Updated: 05/20/24 0115     Blood Culture No growth at 2 days    Blood Culture - Blood, Arm, Left [[id number]]  (Normal) Collected: 05/18/24 0057    Specimen: Blood from Arm, Left Updated: 05/20/24 0115     Blood Culture No growth at 2 days             Imaging Results (Last 24 Hours)       Procedure Component Value Units Date/Time    CT Chest Without Contrast Diagnostic [[id number]] Collected: 05/19/24 1954     Updated: 05/19/24 2000    Narrative:      CT CHEST WO CONTRAST DIAGNOSTIC    Date of Exam: 5/19/2024 2:11 PM  EDT    Indication: h/o lung cancer.    Comparison: 1/22/2024    Technique: Axial CT images were obtained of the chest without contrast administration.  Sagittal and coronal reconstructions were performed.  Automated exposure control and iterative reconstruction methods were used.    Findings: Emphysema. Left upper lobe calcified granuloma. Small bilateral effusions. Cardiomegaly. Postsurgical changes within the right lower lobe. There has been slight interval enlargement of the soft tissue surrounding the postsurgical changes in the   right lower lobe of unknown relation to the patient's history of malignancy or the presence of pleural effusions. On axial image #56 this measures 2.5 cm x 2.7 cm compared with 2.3 cm x 2.3 cm previously representing minimal interval enlargement. No   pneumothorax. Atheromatous disease of the coronary vessels. No adenopathy. The main pulmonary artery measures 4.1 cm in diameter unchanged compared with the prior study consistent with pulmonary arterial hypertension.    2.3 cm x 1.7 cm stable left adrenal nodule. Bilateral renal atrophy. Right renal cyst. Cholelithiasis. Healed right-sided rib fractures. Inflammation of the right lateral chest wall.      Impression:      Minimal interval enlargement of the tissue surrounding the postsurgical changes associated with small bilateral pleural effusions. Enlargement of the main pulmonary artery suggesting pulmonary arterial hypertension.      Electronically Signed: Adalid Silverio MD    5/19/2024 7:58 PM EDT    Workstation ID: [id number]                 I reviewed the patient's new clinical results.    Medication Review:   Scheduled Meds:apixaban, 5 mg, Oral, Q12H  cefTRIAXone, 1,000 mg, Intravenous, Q24H  dilTIAZem, 90 mg, Oral, Q8H  DULoxetine, 40 mg, Oral, Daily  ferrous sulfate, 324 mg, Oral, Daily With Breakfast  furosemide, 80 mg, Intravenous, BID  guaiFENesin, 1,200 mg, Oral, BID  levothyroxine, 50 mcg, Oral, Q AM  metOLazone, 5 mg, Oral,  Once  metoprolol succinate XL, 50 mg, Oral, Daily  nystatin, , Topical, Q12H  pantoprazole, 40 mg, Intravenous, Q AM  predniSONE, 5 mg, Oral, Daily  sodium chloride, 10 mL, Intravenous, Q12H  tacrolimus, 1 mg, Oral, BID      Continuous Infusions:     PRN Meds:.  acetaminophen    albuterol    senna-docusate sodium **AND** polyethylene glycol **AND** bisacodyl **AND** bisacodyl    Calcium Replacement - Follow Nurse / BPA Driven Protocol    carboxymethylcellulose sod    diazePAM    Diclofenac Sodium    HYDROcodone-acetaminophen    loperamide    Magnesium Standard Dose Replacement - Follow Nurse / BPA Driven Protocol    mineral oil-hydrophilic petrolatum    nitroglycerin    ondansetron    Phosphorus Replacement - Follow Nurse / BPA Driven Protocol    Potassium Replacement - Follow Nurse / BPA Driven Protocol    [COMPLETED] Insert Peripheral IV **AND** sodium chloride    sodium chloride    sodium chloride     Assessment & Plan       Acute UTI    COPD (chronic obstructive pulmonary disease)    Atrial fibrillation with rapid ventricular response    Volume overload    Fall    Hypothyroidism, unspecified    Hyperlipidemia    History of lobectomy of lung    History of kidney transplant    History of lung cancer    Nonobstructive atherosclerosis of coronary artery    History of DVT (deep vein thrombosis)    Pulmonary hypertension    Chronic hypoxemic respiratory failure    Essential (primary) hypertension    Paroxysmal atrial fibrillation    Valvular heart disease    PLAN:  UTI, E. coli ESBL  -Discontinue Rocephin and start Merrem  -Will consult ID     Afib with RVR  -Off Cardizem drip, continue Cardizem and metoprolol  - cardiology following  - eliquis     Volume overload, improving  - IV lasix increased by nephrology to 80 mg twice daily with metolazone 5 mg x 1 dose today, fluid restriction    H/o kidney transplant   - nephrology following, antirejection medication     S/p fall  -   - CT head and cervical spine  unremarkable    History of lung cancer  Chest CT shows minimal enlargement of tissue surrounding postsurgical changes, will consult pulmonary     COPD  HTN  Weakness - PT/OT  Anemia -oral iron  HLD - hold statin because of elevated CK  hypothyroid     DVT prophy - eliquis  Stress ulcer prophy - PPI        Plan for disposition:BILL Stearns, APRN  05/20/24  14:25 EDT

## 2024-05-20 NOTE — THERAPY TREATMENT NOTE
"Subjective: Pt agreeable to therapeutic plan of care with encouragement.  Reports nausea which she states is normal for her in the morning.     Objective:     Bed mobility - Min-A  Transfers - Min-A, Mod-A, and with rolling walker  Ambulation - 2 feet CGA and with rolling walker    Therapeutic Exercise - Seated trunk TE EOB with intermittent UE support reaching outside OLMAN and across midline      Vitals: Desaturates into the 80's w/ ax and talking on 3L/min. Recovers quickly.       Education: Provided education on the importance of mobility in the acute care setting, Verbal/Tactile Cues, Transfer Training, Gait Training, and Energy conservation strategies    Assessment: Jaja Carcamo presents with functional mobility impairments which indicate the need for skilled intervention. Mobility has improved this date although continues to require encouragement, cues for technique, and moderate assist for mobility. Therefore patient is unsafe to return home and discharge and will require rehab. Tolerating session today without incident. Will continue to follow and progress as tolerated.     Plan/Recommendations:   If medically appropriate, Moderate Intensity Therapy recommended post-acute care. This is recommended as therapy feels the patient would require 3-4 days per week and wouldn't tolerate \"3 hour daily\" rehab intensity. SNF would be the preferred choice. If the patient does not agree to SNF, arrange HH or OP depending on home bound status. If patient is medically complex, consider LTACH. Pt requires no DME at discharge.     Pt desires Skilled Rehab placement at discharge. Pt cooperative; agreeable to therapeutic recommendations and plan of care.         Basic Mobility 6-click:  Rollin = Total, A lot = 2, A little = 3; 4 = None  Supine>Sit:   1 = Total, A lot = 2, A little = 3; 4 = None   Sit>Stand with arms:  1 = Total, A lot = 2, A little = 3; 4 = None  Bed>Chair:   1 = Total, A lot = 2, A little = 3; 4 " = None  Ambulate in room:  1 = Total, A lot = 2, A little = 3; 4 = None  3-5 Steps with railin = Total, A lot = 2, A little = 3; 4 = None  Score: 15    Modified [place]: 0 = No Symptoms    Post-Tx Position: Up in Chair and Call light and personal items within reach  PPE: gloves and gown

## 2024-05-20 NOTE — PROGRESS NOTES
Referring Provider: Nilsa Lomeli MD    Reason for follow-up: Atrial fibrillation, HFpEF     Patient Care Team:  Jonathan Luna APRN as PCP - General (Family Medicine)  Jak Gavin MD as Cardiologist (Cardiology)      SUBJECTIVE  Laying comfortably in bed has multiple questions today.     ROS  Review of all systems negative except as indicated.    Since I have last seen, the patient has been without any chest discomfort, shortness of breath, palpitations, dizziness or syncope.  Denies having any headache, abdominal pain, nausea, vomiting, diarrhea, constipation, loss of weight or loss of appetite.  Denies having any excessive bruising, hematuria or blood in the stool.  ROS      Personal History:    Past Medical History:   Diagnosis Date    Cancer     lung    COPD (chronic obstructive pulmonary disease)     History of appendectomy     Hypertension     Renal disease     Hartman syndrome        Past Surgical History:   Procedure Laterality Date    BREAST SURGERY Left     cysts rmeoved    CARDIAC CATHETERIZATION Right 8/12/2022    Procedure: Left Heart Cath and coronary angiogram;  Surgeon: Jak Gavin MD;  Location: [facility] CATH INVASIVE LOCATION;  Service: Cardiology;  Laterality: Right;    CLOSED REDUCTION WRIST FRACTURE Right 3/1/2022    Procedure: WRIST CLOSED REDUCTION;  Surgeon: Gaudencio Townsend MD;  Location: [facility] MAIN OR;  Service: Orthopedics;  Laterality: Right;    CYSTOSCOPY      HYSTERECTOMY      LUNG LOBECTOMY Right     TRANSPLANTATION RENAL         Family History   Problem Relation Age of Onset    No Known Problems Mother     No Known Problems Father        Social History     Tobacco Use    Smoking status: Former    Smokeless tobacco: Never   Vaping Use    Vaping status: Former   Substance Use Topics    Alcohol use: Never    Drug use: Never        Home meds:  Prior to Admission medications    Medication Sig Start Date End Date Taking? Authorizing Provider   acetaminophen (Tylenol) 325 MG tablet Take 2  tablets by mouth Every 4 (Four) Hours As Needed for Fever or Mild Pain. 3/13/20  Yes Court Vences MD   apixaban (ELIQUIS) 5 MG tablet tablet Take 1 tablet by mouth Every 12 (Twelve) Hours. 8/16/22  Yes Clyde White DO   albuterol sulfate  (90 Base) MCG/ACT inhaler Inhale 2 puffs Every 6 (Six) Hours As Needed for Wheezing.    Court Vences MD   Carboxymethylcellulose Sodium (DRY EYE RELIEF OP) Apply 2 drops to eye(s) as directed by provider 2 (Two) Times a Day As Needed (dry eyes). 5/11/22   Court Vences MD   colestipol (COLESTID) 1 g tablet Take 1 tablet by mouth Daily.    Court Vences MD   diazePAM (VALIUM) 5 MG tablet Take 5 mg by mouth 2 (Two) Times a Day As Needed for Anxiety.    Court Vences MD   Diclofenac Sodium (Aspercreme Arthritis Pain) 1 % gel gel Apply 4 g topically to the appropriate area as directed 2 (Two) Times a Day As Needed (pain). 10/21/20   Court Vences MD   DULoxetine HCl 40 MG capsule delayed-release particles Take 1 capsule by mouth Daily.    Court Vences MD   furosemide (LASIX) 40 MG tablet Take 40 mg by mouth 2 (Two) Times a Day.    Court Vences MD   guaiFENesin (Mucinex) 600 MG 12 hr tablet Take 1,200 mg by mouth 2 (Two) Times a Day. 8/16/22   Court Vences MD   levothyroxine (SYNTHROID, LEVOTHROID) 50 MCG tablet Take 1 tablet by mouth See Admin Instructions.    Court Vences MD   loperamide (IMODIUM) 2 MG capsule Take 2 mg by mouth 4 (Four) Times a Day As Needed for Diarrhea. 3/13/20   Court Vences MD   metoprolol succinate XL (TOPROL-XL) 50 MG 24 hr tablet Take 50 mg by mouth Daily.    Court Vences MD   Aksgg-Dyqtg-Gnkgpmh-Pramoxine (Neosporin + Pain Relief Max St) 1 % ointment Apply 1 application topically to the appropriate area as directed 2 (Two) Times a Day As Needed (sores). 7/2/20   Court Vences MD   O2 (OXYGEN) Inhale 2 L/min Continuous. 7/2/20    Court Vences MD   potassium chloride (K-DUR,KLOR-CON) 20 MEQ CR tablet Take 1 tablet by mouth Daily. 5/31/21   Court Vences MD   predniSONE (DELTASONE) 5 MG tablet Take 5 mg by mouth Daily.    Court Vences MD   Salicylic Acid-Urea (KERASAL EX) Apply 1 application topically to the appropriate area as directed 2 (Two) Times a Day As Needed (dry feet). 11/23/19   Court Vences MD   simvastatin (ZOCOR) 20 MG tablet Take 20 mg by mouth Every Night.    Court Vences MD   tacrolimus (PROGRAF) 1 MG capsule Take 1 mg by mouth 2 (Two) Times a Day.    Court Vences MD   Tolnaftate 1 % gel Apply 1 application topically to the appropriate area as directed 2 (Two) Times a Day As Needed (ingrown toenail). 11/30/20   Court Vences MD       Allergies:  Zolpidem    Scheduled Meds:apixaban, 5 mg, Oral, Q12H  cefTRIAXone, 1,000 mg, Intravenous, Q24H  dilTIAZem, 90 mg, Oral, Q8H  DULoxetine, 40 mg, Oral, Daily  ferrous sulfate, 324 mg, Oral, Daily With Breakfast  furosemide, 40 mg, Intravenous, BID  guaiFENesin, 1,200 mg, Oral, BID  levothyroxine, 50 mcg, Oral, Q AM  metoprolol succinate XL, 50 mg, Oral, Daily  nystatin, , Topical, Q12H  pantoprazole, 40 mg, Intravenous, Q AM  predniSONE, 5 mg, Oral, Daily  sodium chloride, 10 mL, Intravenous, Q12H  tacrolimus, 1 mg, Oral, BID      Continuous Infusions:   PRN Meds:.  acetaminophen    albuterol    senna-docusate sodium **AND** polyethylene glycol **AND** bisacodyl **AND** bisacodyl    Calcium Replacement - Follow Nurse / BPA Driven Protocol    carboxymethylcellulose sod    diazePAM    Diclofenac Sodium    HYDROcodone-acetaminophen    loperamide    Magnesium Standard Dose Replacement - Follow Nurse / BPA Driven Protocol    nitroglycerin    ondansetron    Phosphorus Replacement - Follow Nurse / BPA Driven Protocol    Potassium Replacement - Follow Nurse / BPA Driven Protocol    [COMPLETED] Insert Peripheral IV **AND** sodium  "chloride    sodium chloride    sodium chloride      OBJECTIVE    Vital Signs  Vitals:    05/19/24 1745 05/19/24 1945 05/19/24 2355 05/20/24 0413   BP:  147/84 149/64 124/56   BP Location:  Left arm Left arm Left arm   Patient Position:  Lying Lying Lying   Pulse: 88 97 101 63   Resp:  25 24 25   Temp:  99 °F (37.2 °C) 98.2 °F (36.8 °C) 97.9 °F (36.6 °C)   TempSrc:  Oral Oral Oral   SpO2: 98% 99% 98% 99%   Weight:    92.3 kg (203 lb 7.8 oz)   Height:           Flowsheet Rows      Flowsheet Row First Filed Value   Admission Height 167.6 cm (66\") Documented at 05/17/2024 2150   Admission Weight 80.7 kg (178 lb) Documented at 05/17/2024 2150              Intake/Output Summary (Last 24 hours) at 5/20/2024 0711  Last data filed at 5/19/2024 2353  Gross per 24 hour   Intake 480 ml   Output 1250 ml   Net -770 ml          Telemetry: Atrial fibrillation with slow heart rate.    Physical Exam:  The patient is alert, oriented and in no distress.  Obese  Vital signs as noted above.  Head and neck revealed no carotid bruits or jugular venous distention.  No thyromegaly or lymphadenopathy is present  Lungs clear.  No wheezing.  Breath sounds are normal bilaterally.  Heart normal first and second heart sounds.  No murmur. No precordial rub is present.  No gallop is present.  Abdomen soft and nontender.  No organomegaly is present.  Extremities with good peripheral pulses without any pedal edema.  Skin warm and dry.  Musculoskeletal system is grossly normal.  CNS grossly normal.       Results Review:  I have personally reviewed the results from the time of this admission to 5/20/2024 07:11 EDT and agree with these findings:  []  Laboratory  []  Microbiology  []  Radiology  []  EKG/Telemetry   []  Cardiology/Vascular   []  Pathology  []  Old records  []  Other:    Most notable findings include:    Lab Results (last 24 hours)       Procedure Component Value Units Date/Time    Comprehensive Metabolic Panel [[id number]]  (Abnormal) " Collected: 05/20/24 0337    Specimen: Blood Updated: 05/20/24 0457     Glucose 113 mg/dL      BUN 15 mg/dL      Creatinine 0.74 mg/dL      Sodium 142 mmol/L      Potassium 3.8 mmol/L      Chloride 104 mmol/L      CO2 28.8 mmol/L      Calcium 9.1 mg/dL      Total Protein 5.4 g/dL      Albumin 3.3 g/dL      ALT (SGPT) 9 U/L      AST (SGOT) 16 U/L      Alkaline Phosphatase 52 U/L      Total Bilirubin 0.6 mg/dL      Globulin 2.1 gm/dL      A/G Ratio 1.6 g/dL      BUN/Creatinine Ratio 20.3     Anion Gap 9.2 mmol/L      eGFR 83.4 mL/min/1.73     Narrative:      GFR Normal >60  Chronic Kidney Disease <60  Kidney Failure <15    The GFR formula is only valid for adults with stable renal function between ages 18 and 70.    CBC & Differential [[id number]]  (Abnormal) Collected: 05/20/24 0337    Specimen: Blood Updated: 05/20/24 0435    Narrative:      The following orders were created for panel order CBC & Differential.  Procedure                               Abnormality         Status                     ---------                               -----------         ------                     CBC Auto Differential[[id number]]        Abnormal            Final result                 Please view results for these tests on the individual orders.    CBC Auto Differential [[id number]]  (Abnormal) Collected: 05/20/24 0337    Specimen: Blood Updated: 05/20/24 0435     WBC 6.18 10*3/mm3      RBC 3.16 10*6/mm3      Hemoglobin 9.2 g/dL      Hematocrit 31.4 %      MCV 99.4 fL      MCH 29.1 pg      MCHC 29.3 g/dL      RDW 17.2 %      RDW-SD 62.3 fl      MPV 10.3 fL      Platelets 120 10*3/mm3      Neutrophil % 77.8 %      Lymphocyte % 9.7 %      Monocyte % 10.0 %      Eosinophil % 1.9 %      Basophil % 0.3 %      Immature Grans % 0.3 %      Neutrophils, Absolute 4.80 10*3/mm3      Lymphocytes, Absolute 0.60 10*3/mm3      Monocytes, Absolute 0.62 10*3/mm3      Eosinophils, Absolute 0.12 10*3/mm3      Basophils, Absolute 0.02 10*3/mm3       Immature Grans, Absolute 0.02 10*3/mm3      nRBC 0.0 /100 WBC     Blood Culture - Blood, Arm, Left [[id number]]  (Normal) Collected: 05/18/24 0057    Specimen: Blood from Arm, Left Updated: 05/20/24 0115     Blood Culture No growth at 2 days    Blood Culture - Blood, Arm, Left [[id number]]  (Normal) Collected: 05/18/24 0057    Specimen: Blood from Arm, Left Updated: 05/20/24 0115     Blood Culture No growth at 2 days    Urine Culture - Urine, Straight Cath [[id number]]  (Abnormal) Collected: 05/17/24 2245    Specimen: Urine from Straight Cath Updated: 05/19/24 1017     Urine Culture >100,000 CFU/mL Gram Negative Bacilli    Narrative:      Colonization of the urinary tract without infection is common. Treatment is discouraged unless the patient is symptomatic, pregnant, or undergoing an invasive urologic procedure.            Imaging Results (Last 24 Hours)       Procedure Component Value Units Date/Time    CT Chest Without Contrast Diagnostic [[id number]] Collected: 05/19/24 1954     Updated: 05/19/24 2000    Narrative:      CT CHEST WO CONTRAST DIAGNOSTIC    Date of Exam: 5/19/2024 2:11 PM EDT    Indication: h/o lung cancer.    Comparison: 1/22/2024    Technique: Axial CT images were obtained of the chest without contrast administration.  Sagittal and coronal reconstructions were performed.  Automated exposure control and iterative reconstruction methods were used.    Findings: Emphysema. Left upper lobe calcified granuloma. Small bilateral effusions. Cardiomegaly. Postsurgical changes within the right lower lobe. There has been slight interval enlargement of the soft tissue surrounding the postsurgical changes in the   right lower lobe of unknown relation to the patient's history of malignancy or the presence of pleural effusions. On axial image #56 this measures 2.5 cm x 2.7 cm compared with 2.3 cm x 2.3 cm previously representing minimal interval enlargement. No   pneumothorax. Atheromatous disease of the coronary  vessels. No adenopathy. The main pulmonary artery measures 4.1 cm in diameter unchanged compared with the prior study consistent with pulmonary arterial hypertension.    2.3 cm x 1.7 cm stable left adrenal nodule. Bilateral renal atrophy. Right renal cyst. Cholelithiasis. Healed right-sided rib fractures. Inflammation of the right lateral chest wall.      Impression:      Minimal interval enlargement of the tissue surrounding the postsurgical changes associated with small bilateral pleural effusions. Enlargement of the main pulmonary artery suggesting pulmonary arterial hypertension.      Electronically Signed: Adalid Silverio MD    5/19/2024 7:58 PM EDT    Workstation ID: [id number]            LAB RESULTS (LAST 7 DAYS)    CBC  Results from last 7 days   Lab Units 05/20/24 0337 05/19/24 0521 05/18/24 0509 05/17/24  2220   WBC 10*3/mm3 6.18 6.70 9.01 9.75   RBC 10*6/mm3 3.16* 3.45* 3.62* 3.91   HEMOGLOBIN g/dL 9.2* 9.9* 10.4* 11.4*   HEMATOCRIT % 31.4* 33.6* 35.3 38.8   MCV fL 99.4* 97.4* 97.5* 99.2*   PLATELETS 10*3/mm3 120* 128* 116* 125*       BMP  Results from last 7 days   Lab Units 05/20/24  0337 05/19/24  0521 05/18/24  0509 05/17/24  2220   SODIUM mmol/L 142 140 141 141   POTASSIUM mmol/L 3.8 4.0 5.1 6.0*   CHLORIDE mmol/L 104 104 105 104   CO2 mmol/L 28.8 29.0 25.0 21.0*   BUN mg/dL 15 20 22 22   CREATININE mg/dL 0.74 0.75 0.86 0.97   GLUCOSE mg/dL 113* 117* 116* 145*       CMP   Results from last 7 days   Lab Units 05/20/24  0337 05/19/24 0521 05/18/24  0509 05/17/24  2220   SODIUM mmol/L 142 140 141 141   POTASSIUM mmol/L 3.8 4.0 5.1 6.0*   CHLORIDE mmol/L 104 104 105 104   CO2 mmol/L 28.8 29.0 25.0 21.0*   BUN mg/dL 15 20 22 22   CREATININE mg/dL 0.74 0.75 0.86 0.97   GLUCOSE mg/dL 113* 117* 116* 145*   ALBUMIN g/dL 3.3* 3.5 3.9 4.0   BILIRUBIN mg/dL 0.6 0.9 1.4* 2.5*   ALK PHOS U/L 52 51 58 65   AST (SGOT) U/L 16 16 22 24   ALT (SGPT) U/L 9 9 10 12       BNP        TROPONIN  Results from last 7 days   Lab  Units 05/18/24  0057 05/17/24  2220   CK TOTAL U/L  --  208*   HSTROP T ng/L 30* 29*       CoAg        Creatinine Clearance  Estimated Creatinine Clearance: 72.9 mL/min (by C-G formula based on SCr of 0.74 mg/dL).    ABG  Results from last 7 days   Lab Units 05/17/24  2309   PH, ARTERIAL pH units 7.321*   PCO2, ARTERIAL mm Hg 41.2   PO2 ART mm Hg 105.4   O2 SATURATION ART % 97.6   BASE EXCESS ART mmol/L -4.6*       Radiology  CT Chest Without Contrast Diagnostic    Result Date: 5/19/2024  Minimal interval enlargement of the tissue surrounding the postsurgical changes associated with small bilateral pleural effusions. Enlargement of the main pulmonary artery suggesting pulmonary arterial hypertension. Electronically Signed: Adalid Silverio MD  5/19/2024 7:58 PM EDT  Workstation ID: [id number]       EKG  I personally viewed and interpreted the patient's EKG/Telemetry data:  ECG 12 Lead Dyspnea   Final Result   HEART RATE= 108  bpm   RR Interval= 556  ms   IL Interval=   ms   P Horizontal Axis=   deg   P Front Axis=   deg   QRSD Interval= 79  ms   QT Interval= 333  ms   QTcB= 447  ms   QRS Axis= -33  deg   T Wave Axis= 64  deg   - ABNORMAL ECG -   Atrial fibrillation   Ventricular premature complex   Left axis deviation   Low voltage, precordial leads   Electronically Signed By: Baldev Ramirez (Derek) 18-May-2024 06:19:24   Date and Time of Study: 2024-05-17 22:05:58      Telemetry Scan   Final Result            Echocardiogram:    Results for orders placed during the hospital encounter of 08/11/22    Adult Transthoracic Echo Limited W/ Cont if Necessary Per Protocol    Interpretation Summary  · Calculated left ventricular EF = 55% Estimated left ventricular EF was in agreement with the calculated left ventricular EF.  · Left ventricular diastolic function was not assessed.  · The right atrial cavity is borderline dilated.  · Moderate mitral valve regurgitation is present.        Stress Test:         Cardiac  Catheterization:  Results for orders placed during the hospital encounter of 08/11/22    Cardiac Catheterization/Vascular Study    Conclusion  IMPRESSIONS  Non obstructive CAD         Other:         ASSESSMENT & PLAN:    Principal Problem:    Acute UTI    Atrial fibrillation  [id number] score is 5  Continue Eliquis for stroke prevention  On Toprol-XL and Cardizem for rate control  Will switch to long-acting Cardizem tomorrow  TSH is normal    HFpEF/Pulmonary hypertension  Presented with proBNP of more than 8000  Continue IV diuretics  Monitor I's and O's and replace electrolytes as needed  Continue Toprol-XL  Start Jardiance  Pending repeat echocardiogram    UTI  Currently on treatment with antibiotics    History of kidney transplant  On prednisone and Prograf  Creatinine 0.74, GFR is 83.4    COPD  History of lung cancer  CT shows minimal interval enlargement of the tissue surrounding the postsurgical changes associated with small bilateral pleural effusions.  Continue bronchodilators.  She should follow-up with an oncologist outpatient.    Obesity  Lifestyle modifications recommended to the patient.  BMI is 33      Jak Gavin MD  05/20/24  07:11 EDT

## 2024-05-20 NOTE — PROGRESS NOTES
RENAL/[facility]:    Name: Jaja Carcamo  Age: 79 y.o.  : 1945  Sex: female    26    Subjective  Patient admitted with shortness of breath     Interval History:   Good UOP   Remains edematous  Weight up      Objective:    Vital Signs  Temp:  [97.5 °F (36.4 °C)-99 °F (37.2 °C)] 97.5 °F (36.4 °C)  Heart Rate:  [] 85  Resp:  [22-28] 22  BP: (117-149)/(54-84) 132/65        Intake/Output Summary (Last 24 hours) at 2024 1419  Last data filed at 2024 0857  Gross per 24 hour   Intake 480 ml   Output 1250 ml   Net -770 ml           Physical Exam  GEN: Awake, NAD  ENT: PERRL, EOMI, MMM  NECK: Supple, no JVD  CHEST: CTAB, no W/R/C  CV: RRR, no M/G/R. +edema  ABD: Soft, NT, +BS  SKIN: Warm and Dry  NEURO: CN's intact       Results Review:      Results from last 7 days   Lab Units 24  03324  0524  05024  2220   SODIUM mmol/L 142 140 141 141   CO2 mmol/L 28.8 29.0 25.0 21.0*   BUN mg/dL 15  22   CREATININE mg/dL 0.74 0.75 0.86 0.97   CALCIUM mg/dL 9.1 9.3 9.8 10.1   ALBUMIN g/dL 3.3* 3.5 3.9 4.0   AST (SGOT) U/L 16 16  24   ALT (SGPT) U/L 9 9 10 12   EGFR mL/min/1.73 83.4 82.1 69.7 60.3       Results from last 7 days   Lab Units 24  03324  0524  05024  2220   WBC 10*3/mm3 6.18 6.70 9.01 9.75       Imaging studies: I personally reviewed the patient's most recent pertinent imaging studies       Medication Review:   apixaban, 5 mg, Oral, Q12H  cefTRIAXone, 1,000 mg, Intravenous, Q24H  dilTIAZem, 90 mg, Oral, Q8H  DULoxetine, 40 mg, Oral, Daily  ferrous sulfate, 324 mg, Oral, Daily With Breakfast  furosemide, 80 mg, Intravenous, BID  guaiFENesin, 1,200 mg, Oral, BID  levothyroxine, 50 mcg, Oral, Q AM  metOLazone, 5 mg, Oral, Once  metoprolol succinate XL, 50 mg, Oral, Daily  nystatin, , Topical, Q12H  pantoprazole, 40 mg, Intravenous, Q AM  predniSONE, 5 mg, Oral, Daily  sodium chloride, 10 mL, Intravenous, Q12H  tacrolimus, 1 mg, Oral,  BID          Assessment  ESRD - s/p DDKT    Chronic immunoRx     Fluid overload     Atrial fibrillation    History of COPD     History of lung cancer       Plan:  Cr stable at 0.7 and lytes OK  Weight up  Increase Lasix to 80 mg BID  Metolazone 5 mg PO x 1 today  Add fluid restriction  Will follow      Naun Falcon MD  [facility]  05/20/24  14:19 EDT  Tel: [phone]  Fax: [phone]

## 2024-05-20 NOTE — NURSING NOTE
WOCN note:    79 yr old female admitted 5/17/24 after a fall at home and complaints of shortness of breath. She has a hx of COPD, lung cancer with lobectomy, HTN and renal transplant. WOCN consult received for left heel wound.     Patient presents with a linear partial thickness wound to her left heel measuring approximately 1x0.3cm. Primary RN reported purulent exudate noted this morning. No drainage noted with palpation. Serous exudate on the old dressing. There is no disha-wound erythema. Skin is dry and flaky. There is a silicone foam dressing in place as well as a foam off-loading boot.   Will order Aquaphor to rehydrate the skin and continue current care. We will follow as needed.

## 2024-05-21 ENCOUNTER — APPOINTMENT (OUTPATIENT)
Dept: CT IMAGING | Facility: HOSPITAL | Age: 79
End: 2024-05-21
Payer: MEDICARE

## 2024-05-21 LAB
ALBUMIN SERPL-MCNC: 3.9 G/DL (ref 3.5–5.2)
ALBUMIN/GLOB SERPL: 1.6 G/DL
ALP SERPL-CCNC: 56 U/L (ref 39–117)
ALT SERPL W P-5'-P-CCNC: 10 U/L (ref 1–33)
ANION GAP SERPL CALCULATED.3IONS-SCNC: 9 MMOL/L (ref 5–15)
AST SERPL-CCNC: 18 U/L (ref 1–32)
BASOPHILS # BLD AUTO: 0.02 10*3/MM3 (ref 0–0.2)
BASOPHILS NFR BLD AUTO: 0.3 % (ref 0–1.5)
BILIRUB SERPL-MCNC: 0.8 MG/DL (ref 0–1.2)
BUN SERPL-MCNC: 16 MG/DL (ref 8–23)
BUN/CREAT SERPL: 18.2 (ref 7–25)
CALCIUM SPEC-SCNC: 9.6 MG/DL (ref 8.6–10.5)
CHLORIDE SERPL-SCNC: 101 MMOL/L (ref 98–107)
CO2 SERPL-SCNC: 32 MMOL/L (ref 22–29)
CREAT SERPL-MCNC: 0.88 MG/DL (ref 0.57–1)
DEPRECATED RDW RBC AUTO: 61.8 FL (ref 37–54)
EGFRCR SERPLBLD CKD-EPI 2021: 67.8 ML/MIN/1.73
EOSINOPHIL # BLD AUTO: 0.13 10*3/MM3 (ref 0–0.4)
EOSINOPHIL NFR BLD AUTO: 2 % (ref 0.3–6.2)
ERYTHROCYTE [DISTWIDTH] IN BLOOD BY AUTOMATED COUNT: 17.1 % (ref 12.3–15.4)
GLOBULIN UR ELPH-MCNC: 2.4 GM/DL
GLUCOSE SERPL-MCNC: 111 MG/DL (ref 65–99)
HCT VFR BLD AUTO: 34 % (ref 34–46.6)
HGB BLD-MCNC: 10 G/DL (ref 12–15.9)
IMM GRANULOCYTES # BLD AUTO: 0.04 10*3/MM3 (ref 0–0.05)
IMM GRANULOCYTES NFR BLD AUTO: 0.6 % (ref 0–0.5)
LYMPHOCYTES # BLD AUTO: 0.57 10*3/MM3 (ref 0.7–3.1)
LYMPHOCYTES NFR BLD AUTO: 8.6 % (ref 19.6–45.3)
MCH RBC QN AUTO: 29.2 PG (ref 26.6–33)
MCHC RBC AUTO-ENTMCNC: 29.4 G/DL (ref 31.5–35.7)
MCV RBC AUTO: 99.4 FL (ref 79–97)
MONOCYTES # BLD AUTO: 0.75 10*3/MM3 (ref 0.1–0.9)
MONOCYTES NFR BLD AUTO: 11.3 % (ref 5–12)
NEUTROPHILS NFR BLD AUTO: 5.13 10*3/MM3 (ref 1.7–7)
NEUTROPHILS NFR BLD AUTO: 77.2 % (ref 42.7–76)
NRBC BLD AUTO-RTO: 0 /100 WBC (ref 0–0.2)
PLATELET # BLD AUTO: 121 10*3/MM3 (ref 140–450)
PMV BLD AUTO: 9.6 FL (ref 6–12)
POTASSIUM SERPL-SCNC: 3.4 MMOL/L (ref 3.5–5.2)
POTASSIUM SERPL-SCNC: 4.4 MMOL/L (ref 3.5–5.2)
PROT SERPL-MCNC: 6.3 G/DL (ref 6–8.5)
RBC # BLD AUTO: 3.42 10*6/MM3 (ref 3.77–5.28)
SODIUM SERPL-SCNC: 142 MMOL/L (ref 136–145)
WBC NRBC COR # BLD AUTO: 6.64 10*3/MM3 (ref 3.4–10.8)

## 2024-05-21 PROCEDURE — 63710000001 TACROLIMUS PER 1 MG: Performed by: NURSE PRACTITIONER

## 2024-05-21 PROCEDURE — 97530 THERAPEUTIC ACTIVITIES: CPT

## 2024-05-21 PROCEDURE — 85025 COMPLETE CBC W/AUTO DIFF WBC: CPT | Performed by: NURSE PRACTITIONER

## 2024-05-21 PROCEDURE — 97112 NEUROMUSCULAR REEDUCATION: CPT

## 2024-05-21 PROCEDURE — 84132 ASSAY OF SERUM POTASSIUM: CPT | Performed by: FAMILY MEDICINE

## 2024-05-21 PROCEDURE — 97116 GAIT TRAINING THERAPY: CPT

## 2024-05-21 PROCEDURE — 25010000002 ONDANSETRON PER 1 MG: Performed by: NURSE PRACTITIONER

## 2024-05-21 PROCEDURE — 97537 COMMUNITY/WORK REINTEGRATION: CPT

## 2024-05-21 PROCEDURE — 63710000001 PREDNISONE PER 5 MG: Performed by: NURSE PRACTITIONER

## 2024-05-21 PROCEDURE — 97535 SELF CARE MNGMENT TRAINING: CPT

## 2024-05-21 PROCEDURE — 74176 CT ABD & PELVIS W/O CONTRAST: CPT

## 2024-05-21 PROCEDURE — 99232 SBSQ HOSP IP/OBS MODERATE 35: CPT | Performed by: INTERNAL MEDICINE

## 2024-05-21 PROCEDURE — 25010000002 MEROPENEM PER 100 MG

## 2024-05-21 PROCEDURE — 25010000002 FUROSEMIDE PER 20 MG: Performed by: INTERNAL MEDICINE

## 2024-05-21 PROCEDURE — 80053 COMPREHEN METABOLIC PANEL: CPT | Performed by: NURSE PRACTITIONER

## 2024-05-21 PROCEDURE — 97110 THERAPEUTIC EXERCISES: CPT

## 2024-05-21 RX ORDER — FUROSEMIDE 10 MG/ML
80 INJECTION INTRAMUSCULAR; INTRAVENOUS 2 TIMES DAILY
Status: COMPLETED | OUTPATIENT
Start: 2024-05-21 | End: 2024-05-21

## 2024-05-21 RX ORDER — GRANULES FOR ORAL 3 G/1
3 POWDER ORAL ONCE
Qty: 3 G | Refills: 0 | Status: COMPLETED | OUTPATIENT
Start: 2024-05-21 | End: 2024-05-21

## 2024-05-21 RX ORDER — POTASSIUM CHLORIDE 20 MEQ/1
40 TABLET, EXTENDED RELEASE ORAL EVERY 4 HOURS
Status: COMPLETED | OUTPATIENT
Start: 2024-05-21 | End: 2024-05-21

## 2024-05-21 RX ORDER — FUROSEMIDE 40 MG/1
80 TABLET ORAL
Status: DISCONTINUED | OUTPATIENT
Start: 2024-05-22 | End: 2024-05-22 | Stop reason: HOSPADM

## 2024-05-21 RX ADMIN — ONDANSETRON 4 MG: 2 INJECTION INTRAMUSCULAR; INTRAVENOUS at 09:49

## 2024-05-21 RX ADMIN — POTASSIUM CHLORIDE 40 MEQ: 1500 TABLET, EXTENDED RELEASE ORAL at 05:28

## 2024-05-21 RX ADMIN — FUROSEMIDE 80 MG: 10 INJECTION, SOLUTION INTRAMUSCULAR; INTRAVENOUS at 21:19

## 2024-05-21 RX ADMIN — DILTIAZEM HYDROCHLORIDE 90 MG: 30 TABLET, FILM COATED ORAL at 21:19

## 2024-05-21 RX ADMIN — GUAIFENESIN 1200 MG: 600 TABLET, EXTENDED RELEASE ORAL at 08:27

## 2024-05-21 RX ADMIN — FERROUS SULFATE TAB EC 324 MG (65 MG FE EQUIVALENT) 324 MG: 324 (65 FE) TABLET DELAYED RESPONSE at 08:27

## 2024-05-21 RX ADMIN — DILTIAZEM HYDROCHLORIDE 90 MG: 30 TABLET, FILM COATED ORAL at 04:46

## 2024-05-21 RX ADMIN — FUROSEMIDE 80 MG: 10 INJECTION, SOLUTION INTRAMUSCULAR; INTRAVENOUS at 08:35

## 2024-05-21 RX ADMIN — GRANULES FOR ORAL SOLUTION 3 G: 3 POWDER ORAL at 16:45

## 2024-05-21 RX ADMIN — POTASSIUM CHLORIDE 40 MEQ: 1500 TABLET, EXTENDED RELEASE ORAL at 09:49

## 2024-05-21 RX ADMIN — TACROLIMUS 1 MG: 1 CAPSULE ORAL at 08:27

## 2024-05-21 RX ADMIN — NYSTATIN: 100000 POWDER TOPICAL at 21:20

## 2024-05-21 RX ADMIN — LEVOTHYROXINE SODIUM 50 MCG: 0.05 TABLET ORAL at 04:46

## 2024-05-21 RX ADMIN — APIXABAN 5 MG: 5 TABLET, FILM COATED ORAL at 21:18

## 2024-05-21 RX ADMIN — MEROPENEM 500 MG: 500 INJECTION, POWDER, FOR SOLUTION INTRAVENOUS at 09:49

## 2024-05-21 RX ADMIN — DIAZEPAM 5 MG: 5 TABLET ORAL at 23:13

## 2024-05-21 RX ADMIN — MEROPENEM 500 MG: 500 INJECTION, POWDER, FOR SOLUTION INTRAVENOUS at 15:28

## 2024-05-21 RX ADMIN — Medication 10 ML: at 21:19

## 2024-05-21 RX ADMIN — PANTOPRAZOLE SODIUM 40 MG: 40 INJECTION, POWDER, FOR SOLUTION INTRAVENOUS at 04:47

## 2024-05-21 RX ADMIN — MEROPENEM 500 MG: 500 INJECTION, POWDER, FOR SOLUTION INTRAVENOUS at 03:54

## 2024-05-21 RX ADMIN — Medication 10 ML: at 08:26

## 2024-05-21 RX ADMIN — METOPROLOL SUCCINATE 50 MG: 50 TABLET, FILM COATED, EXTENDED RELEASE ORAL at 11:32

## 2024-05-21 RX ADMIN — DULOXETINE HYDROCHLORIDE 40 MG: 20 CAPSULE, DELAYED RELEASE ORAL at 08:28

## 2024-05-21 RX ADMIN — TACROLIMUS 1 MG: 1 CAPSULE ORAL at 21:19

## 2024-05-21 RX ADMIN — PREDNISONE 5 MG: 5 TABLET ORAL at 08:27

## 2024-05-21 RX ADMIN — NYSTATIN 1 APPLICATION: 100000 POWDER TOPICAL at 08:35

## 2024-05-21 RX ADMIN — HYDROCODONE BITARTRATE AND ACETAMINOPHEN 1 TABLET: 5; 325 TABLET ORAL at 21:30

## 2024-05-21 RX ADMIN — HYDROCODONE BITARTRATE AND ACETAMINOPHEN 1 TABLET: 5; 325 TABLET ORAL at 04:46

## 2024-05-21 RX ADMIN — GUAIFENESIN 1200 MG: 600 TABLET, EXTENDED RELEASE ORAL at 21:19

## 2024-05-21 RX ADMIN — APIXABAN 5 MG: 5 TABLET, FILM COATED ORAL at 08:27

## 2024-05-21 RX ADMIN — DILTIAZEM HYDROCHLORIDE 90 MG: 30 TABLET, FILM COATED ORAL at 15:24

## 2024-05-21 RX ADMIN — DIAZEPAM 5 MG: 5 TABLET ORAL at 11:30

## 2024-05-21 NOTE — PROGRESS NOTES
"PULMONARY CRITICAL CARE PROGRESS  NOTE      PATIENT IDENTIFICATION:  Name: Jaja Carcamo  MRN: [id number]  :  1945     Age: 79 y.o.  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      Objective:  tMax 24 hrs: Temp (24hrs), Av.8 °F (36.6 °C), Min:97.5 °F (36.4 °C), Max:98 °F (36.7 °C)      Vitals Ranges:   Temp:  [97.5 °F (36.4 °C)-98 °F (36.7 °C)] 98 °F (36.7 °C)  Heart Rate:  [69-90] 80  Resp:  [14-22] 22  BP: (120-142)/(57-74) 139/66    Intake and Output Last 3 Shifts:   I/O last 3 completed shifts:  In: 480 [P.O.:480]  Out: 2450 [Urine:2450]    Exam:  /66 (BP Location: Left arm, Patient Position: Lying)   Pulse 80   Temp 98 °F (36.7 °C) (Oral)   Resp 22   Ht 167.6 cm (66\")   Wt 88.6 kg (195 lb 5.2 oz)   SpO2 93%   BMI 31.53 kg/m²     General Appearance:     HEENT:  Normocephalic, without obvious abnormality. Conjunctivae/corneas clear.  Normal external ear canals. Nares normal, no drainage     Neck:  Supple, symmetrical, trachea midline. No JVD.  Lungs /Chest wall:   Bilateral basal rhonchi, respirations unlabored, symmetrical wall movement.     Heart:  Regular rate and rhythm, systolic murmur PMI left sternal border  Abdomen: Soft, nontender, no masses, no organomegaly.    Extremities: Trace edema, no clubbing or cyanosis        Medications:  apixaban, 5 mg, Oral, Q12H  dilTIAZem, 90 mg, Oral, Q8H  DULoxetine, 40 mg, Oral, Daily  ferrous sulfate, 324 mg, Oral, Daily With Breakfast  furosemide, 80 mg, Intravenous, BID  [START ON 2024] furosemide, 80 mg, Oral, BID  guaiFENesin, 1,200 mg, Oral, BID  levothyroxine, 50 mcg, Oral, Q AM  meropenem, 500 mg, Intravenous, Q6H  metoprolol succinate XL, 50 mg, Oral, Daily  nystatin, , Topical, Q12H  pantoprazole, 40 mg, " Intravenous, Q AM  potassium chloride ER, 40 mEq, Oral, Q4H  predniSONE, 5 mg, Oral, Daily  sodium chloride, 10 mL, Intravenous, Q12H  tacrolimus, 1 mg, Oral, BID        Infusion:  Pharmacy to Dose meropenem (MERREM),          PRN:    acetaminophen    albuterol    senna-docusate sodium **AND** polyethylene glycol **AND** bisacodyl **AND** bisacodyl    Calcium Replacement - Follow Nurse / BPA Driven Protocol    carboxymethylcellulose sod    diazePAM    Diclofenac Sodium    HYDROcodone-acetaminophen    loperamide    Magnesium Standard Dose Replacement - Follow Nurse / BPA Driven Protocol    mineral oil-hydrophilic petrolatum    nitroglycerin    ondansetron    Pharmacy to Dose meropenem (MERREM)    Phosphorus Replacement - Follow Nurse / BPA Driven Protocol    Potassium Replacement - Follow Nurse / BPA Driven Protocol    [COMPLETED] Insert Peripheral IV **AND** sodium chloride    sodium chloride    sodium chloride  Data Review:  All labs (24hrs):   Recent Results (from the past 24 hour(s))   ECG 12 Lead Bradycardia    Collection Time: 05/20/24  9:37 AM   Result Value Ref Range    QTC Interval  ms   Adult Transthoracic Echo Complete W/ Cont if Necessary Per Protocol    Collection Time: 05/20/24  3:37 PM   Result Value Ref Range    LVIDd 4.8 cm    LVIDs 2.9 cm    IVSd 1.20 cm    LVPWd 1.20 cm    FS 39.6 %    IVS/LVPW 1.00 cm    ESV(cubed) 24.4 ml    EDV(cubed) 110.6 ml    LV mass(C)d 219.1 grams    LVOT area 3.1 cm2    LVOT diam 2.00 cm    EDV(MOD-sp4) 53.7 ml    ESV(MOD-sp4) 24.0 ml    SV(MOD-sp4) 29.7 ml    EF(MOD-sp4) 55.3 %    MV E max tomas 185.7 cm/sec    TR max tomas 394.0 cm/sec    SV(LVOT) 72.9 ml    TAPSE (>1.6) 1.85 cm    LA dimension (2D)  4.9 cm    LV V1 max 122.0 cm/sec    LV V1 max PG 6.0 mmHg    LV V1 mean PG 3.0 mmHg    LV V1 VTI 23.2 cm    Ao pk tomas 210.0 cm/sec    Ao max PG 17.6 mmHg    Ao mean PG 10.0 mmHg    Ao V2 VTI 31.1 cm    HERIBERTO(I,D) 2.34 cm2    MV max PG 12.0 mmHg    MV mean PG 5.0 mmHg    MV V2 VTI  42.3 cm    MVA(VTI) 1.72 cm2    MR max tomas 454.0 cm/sec    MR max PG 82.4 mmHg    TR max PG 62.1 mmHg    RVSP(TR) 77.1 mmHg    RAP systole 15.0 mmHg    RV V1 max PG 1.14 mmHg    RV V1 max 53.5 cm/sec    RV V1 VTI 6.9 cm    PA V2 max 114.0 cm/sec    PI end-d tomas 167.0 cm/sec    Sinus 2.7 cm    STJ 2.30 cm    EF(MOD-bp) 55.0 %   Comprehensive Metabolic Panel    Collection Time: 05/21/24  1:45 AM    Specimen: Blood   Result Value Ref Range    Glucose 111 (H) 65 - 99 mg/dL    BUN 16 8 - 23 mg/dL    Creatinine 0.88 0.57 - 1.00 mg/dL    Sodium 142 136 - 145 mmol/L    Potassium 3.4 (L) 3.5 - 5.2 mmol/L    Chloride 101 98 - 107 mmol/L    CO2 32.0 (H) 22.0 - 29.0 mmol/L    Calcium 9.6 8.6 - 10.5 mg/dL    Total Protein 6.3 6.0 - 8.5 g/dL    Albumin 3.9 3.5 - 5.2 g/dL    ALT (SGPT) 10 1 - 33 U/L    AST (SGOT) 18 1 - 32 U/L    Alkaline Phosphatase 56 39 - 117 U/L    Total Bilirubin 0.8 0.0 - 1.2 mg/dL    Globulin 2.4 gm/dL    A/G Ratio 1.6 g/dL    BUN/Creatinine Ratio 18.2 7.0 - 25.0    Anion Gap 9.0 5.0 - 15.0 mmol/L    eGFR 67.8 >60.0 mL/min/1.73   CBC Auto Differential    Collection Time: 05/21/24  1:45 AM    Specimen: Blood   Result Value Ref Range    WBC 6.64 3.40 - 10.80 10*3/mm3    RBC 3.42 (L) 3.77 - 5.28 10*6/mm3    Hemoglobin 10.0 (L) 12.0 - 15.9 g/dL    Hematocrit 34.0 34.0 - 46.6 %    MCV 99.4 (H) 79.0 - 97.0 fL    MCH 29.2 26.6 - 33.0 pg    MCHC 29.4 (L) 31.5 - 35.7 g/dL    RDW 17.1 (H) 12.3 - 15.4 %    RDW-SD 61.8 (H) 37.0 - 54.0 fl    MPV 9.6 6.0 - 12.0 fL    Platelets 121 (L) 140 - 450 10*3/mm3    Neutrophil % 77.2 (H) 42.7 - 76.0 %    Lymphocyte % 8.6 (L) 19.6 - 45.3 %    Monocyte % 11.3 5.0 - 12.0 %    Eosinophil % 2.0 0.3 - 6.2 %    Basophil % 0.3 0.0 - 1.5 %    Immature Grans % 0.6 (H) 0.0 - 0.5 %    Neutrophils, Absolute 5.13 1.70 - 7.00 10*3/mm3    Lymphocytes, Absolute 0.57 (L) 0.70 - 3.10 10*3/mm3    Monocytes, Absolute 0.75 0.10 - 0.90 10*3/mm3    Eosinophils, Absolute 0.13 0.00 - 0.40 10*3/mm3     Basophils, Absolute 0.02 0.00 - 0.20 10*3/mm3    Immature Grans, Absolute 0.04 0.00 - 0.05 10*3/mm3    nRBC 0.0 0.0 - 0.2 /100 WBC        Imaging:  Adult Transthoracic Echo Complete W/ Cont if Necessary Per Protocol    Left ventricular systolic function is normal. Calculated left   ventricular EF = 55% Left ventricular ejection fraction appears to be 51 -   55%.    Left ventricular diastolic function was indeterminate.    Left atrial volume is moderately increased.    The right atrial cavity is dilated.    Severe tricuspid valve regurgitation is present.    Estimated right ventricular systolic pressure from tricuspid   regurgitation is markedly elevated (>55 mmHg).    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 I have reviewed the history, data, problems, assessment and plan with our NP.  Total Critical care time in direct medical management (   ) minutes, This time specifically excludes time spent performing procedures.    Barry Mckinley MD   5/21/2024  12:54 EDT

## 2024-05-21 NOTE — PLAN OF CARE
"Goal Outcome Evaluation:      Assessment: Jaja Carcamo presents with ADL impairments affecting function including balance, endurance / activity tolerance, range of motion (ROM), sensation / sensory awareness, shortness of breath, and strength. Demonstrated functioning below baseline abilities indicate the need for continued skilled intervention while inpatient. Tolerating session today without incident. Pt has acute on chronic Adl deficit, inadequate balance, mobility or activity tolerance for being home alone. Pt continues to decline the recommendation of SNF at d/c. Will continue to follow and progress as tolerated.      Plan/Recommendations:   Moderate Intensity Therapy recommended post-acute care. This is recommended as therapy feels the patient would require 3-4 days per week and wouldn't tolerate \"3 hour daily\" rehab intensity. SNF would be the preferred choice. If the patient does not agree to SNF, arrange HH or OP depending on home bound status. If patient is medically complex, consider LTACH.. Pt requires BSC at discharge.      Pt desires Home with Home Health at discharge. Pt cooperative; agreeable to therapeutic recommendations and plan of care.  "

## 2024-05-21 NOTE — PROGRESS NOTES
RENAL/[facility]:    Name: Jaja Carcamo  Age: 79 y.o.  : 1945  Sex: female    26    Subjective  Patient admitted with shortness of breath     Interval History:   Good UOP   Weight down  Edema slowly better    Objective:    Vital Signs  Temp:  [97.5 °F (36.4 °C)-98 °F (36.7 °C)] 98 °F (36.7 °C)  Heart Rate:  [69-90] 80  Resp:  [14-22] 22  BP: (120-142)/(57-74) 139/66        Intake/Output Summary (Last 24 hours) at 2024 0747  Last data filed at 2024 0500  Gross per 24 hour   Intake 480 ml   Output 1200 ml   Net -720 ml           Physical Exam  GEN: Awake, NAD  ENT: PERRL, EOMI, MMM  NECK: Supple, no JVD  CHEST: CTAB, no W/R/C  CV: RRR, no M/G/R. +edema  ABD: Soft, NT, +BS  SKIN: Warm and Dry  NEURO: CN's intact       Results Review:      Results from last 7 days   Lab Units 24  0145 24  0337 24  0524  05024  2220   SODIUM mmol/L 142 142 140 141 141   CO2 mmol/L 32.0* 28.8 29.0 25.0 21.0*   BUN mg/dL 16 15 20 22 22   CREATININE mg/dL 0.88 0.74 0.75 0.86 0.97   CALCIUM mg/dL 9.6 9.1 9.3 9.8 10.1   ALBUMIN g/dL 3.9 3.3* 3.5 3.9 4.0   AST (SGOT) U/L 18 16 16 22 24   ALT (SGPT) U/L 10 9 9 10 12   EGFR mL/min/1.73 67.8 83.4 82.1 69.7 60.3       Results from last 7 days   Lab Units 24  0145 24  0337 24  0521 24  0509 24  2220   WBC 10*3/mm3 6.64 6.18 6.70 9.01 9.75       Imaging studies: I personally reviewed the patient's most recent pertinent imaging studies       Medication Review:   apixaban, 5 mg, Oral, Q12H  dilTIAZem, 90 mg, Oral, Q8H  DULoxetine, 40 mg, Oral, Daily  ferrous sulfate, 324 mg, Oral, Daily With Breakfast  furosemide, 80 mg, Intravenous, BID  guaiFENesin, 1,200 mg, Oral, BID  levothyroxine, 50 mcg, Oral, Q AM  meropenem, 500 mg, Intravenous, Q6H  metoprolol succinate XL, 50 mg, Oral, Daily  nystatin, , Topical, Q12H  pantoprazole, 40 mg, Intravenous, Q AM  potassium chloride ER, 40 mEq, Oral, Q4H  predniSONE, 5 mg, Oral,  Daily  sodium chloride, 10 mL, Intravenous, Q12H  tacrolimus, 1 mg, Oral, BID          Assessment  ESRD - s/p DDKT    Chronic immunoRx     Fluid overload     Atrial fibrillation    History of COPD     History of lung cancer     Hypokalemia    Plan:  Cr stable at 0.8   K being replaced  Weight down  Continue increased Lasix of 80 mg BID (change to PO after today)  On fluid restriction  Will follow      Naun Falcon MD  [facility]  05/21/24  07:47 EDT  Tel: [phone]  Fax: [phone]

## 2024-05-21 NOTE — THERAPY TREATMENT NOTE
"Subjective: Pt agreeable to therapeutic plan of care. Pt reports she is \"extra tired.\"    Objective:     Bed mobility - Min-A with max cues for initiation  Transfers - Min-A, Max-A, and with rolling walker  Ambulation - 6 feet Min-A and with rolling walker    Therapeutic Exercise - 5 Reps B LE AROM lying supine and unsupported sitting / EOB    Vitals: WNL with O2 at 4 L    Pain: 0 VAS   Location: N/A  Intervention for pain: N/A    Education: Provided education on the importance of mobility in the acute care setting, Verbal/Tactile Cues, Transfer Training, and Gait Training    Assessment: Jaja Carcamo presents with functional mobility impairments which indicate the need for skilled intervention. Pure wick was removed from pt and pt was encouraged that she needs to start using BSC. A pull up brief was donned on pt. Pt reports she does not like to be pushed and that is why she does not want to go to rehab. Pt has edematous weeping lower legs that she is not able to reach to care for presently. Pt has poor awareness of her functional deficits. Pt required max cues for initiation and to do as much for herself as possible. Presently, pt requiring assist of 2 for safe mobility. PT continues to recommend SNF when pt medically appropriate to D/C from hospital.  Tolerating session today without incident. Will continue to follow and progress as tolerated.     Plan/Recommendations:   If medically appropriate, Moderate Intensity Therapy recommended post-acute care. This is recommended as therapy feels the patient would require 3-4 days per week and wouldn't tolerate \"3 hour daily\" rehab intensity. SNF would be the preferred choice. If the patient does not agree to SNF, arrange HH or OP depending on home bound status. If patient is medically complex, consider LTACH. Pt requires no DME at discharge.     Pt desires Home at discharge. Pt cooperative; agreeable to therapeutic recommendations and plan of care.         Basic Mobility " 6-click:  Rollin = Total, A lot = 2, A little = 3; 4 = None  Supine>Sit:   1 = Total, A lot = 2, A little = 3; 4 = None   Sit>Stand with arms:  1 = Total, A lot = 2, A little = 3; 4 = None  Bed>Chair:   1 = Total, A lot = 2, A little = 3; 4 = None  Ambulate in room:  1 = Total, A lot = 2, A little = 3; 4 = None  3-5 Steps with railin = Total, A lot = 2, A little = 3; 4 = None  Score: 14    Modified [place]: N/A = No pre-op stroke/TIA    Post-Tx Position: Up in Chair, Alarms activated, and Call light and personal items within reach  PPE: gloves

## 2024-05-21 NOTE — PROGRESS NOTES
Referring Provider: Nilsa Lomeli MD    Reason for follow-up: Atrial fibrillation, HFpEF     Patient Care Team:  Jonathan Luna APRN as PCP - General (Family Medicine)  Jak Gavin MD as Cardiologist (Cardiology)      SUBJECTIVE  Laying comfortably on chair and denies any chest pain or shortness of breath.     ROS  Review of all systems negative except as indicated.    Since I have last seen, the patient has been without any chest discomfort, shortness of breath, palpitations, dizziness or syncope.  Denies having any headache, abdominal pain, nausea, vomiting, diarrhea, constipation, loss of weight or loss of appetite.  Denies having any excessive bruising, hematuria or blood in the stool.  ROS      Personal History:    Past Medical History:   Diagnosis Date    Allergic rhinitis 04/14/2015    Asthma 08/10/2017    Atrial flutter 05/11/2017    Chronic diarrhea 04/15/2019    Chronic hypoxemic respiratory failure 01/18/2024    Chronic pain 02/03/2015    COPD (chronic obstructive pulmonary disease)     COVID-19 virus detected 08/11/2022    Cytokine release syndrome, grade 1 08/16/2022    Edema of both lower extremities due to peripheral venous insufficiency 03/12/2021    ESRD on hemodialysis 05/22/2013    Essential (primary) hypertension 03/03/2022    Fracture of ulnar styloid 05/19/2022    Fracture of unspecified carpal bone, right wrist, subsequent encounter for fracture with routine healing 03/03/2022    Gastroesophageal reflux disease 11/12/2020    Gout 08/10/2017    Hearing loss 08/10/2017    History of appendectomy     History of DVT (deep vein thrombosis) 11/12/2020    History of kidney transplant 06/30/2017    History of lobectomy of lung 01/18/2024 03/08/2016:  RIGHT Lower Lobe Mass--> Right Video-assisted thoracoscopy with a moderate-to-large wedge resection of the RLL (by Dr. Haris Mcconnell @ [facility])--> Poorly differentiated carcinoma of the RLL.      History of repair of hip joint  05/21/2013    History of suicide attempt 05/20/2024    Hyperlipidemia 08/11/2014    Hypothyroidism, unspecified 03/03/2022    Infection due to extended spectrum beta lactamase (ESBL) producing bacteria 03/11/2016    No A2K system hx. +ESBL E coli urine on 3/11/16.      Irritable bowel syndrome 04/15/2019    Long term (current) use of immunosuppressive biologic 04/28/2021    Long term current use of anticoagulant therapy 01/18/2024    Malignant neoplasm of lung 08/10/2017    Menopausal flushing 08/30/2021    Mitral valve regurgitation 07/06/2015    Mixed anxiety and depressive disorder 04/30/2015    Non-small cell lung cancer 08/10/2017    Nonobstructive atherosclerosis of coronary artery 06/02/2019 08/12/2022: CATH: [facility]: NSTEMI assoc with Covid-19: LM:-nl;  LAD: diffuse, Ca++; 30%; CIRC: Dominant. Normal; RCA: small; 50% diffuse, proximal and mid-segment.      NSTEMI (non-ST elevated myocardial infarction) 08/11/2022    Obsessive-compulsive disorder 04/15/2019    Osteoarthritis 05/20/2024    Paroxysmal atrial fibrillation 05/11/2017    Paroxysmal supraventricular tachycardia 05/11/2017    Peripheral neuropathy 08/11/2014    Personal history of peptic ulcer disease 11/12/2020    Postoperative anemia due to acute blood loss 05/22/2013    Right wrist fracture 03/01/2022    Seasonal allergies 08/10/2017    Secondary hyperparathyroidism of renal origin 09/14/2015    Tricuspid valve regurgitation 03/15/2017    Ulcer of lower extremity 08/30/2021    Unspecified acute appendicitis 03/03/2022    Valvular heart disease 05/20/2024    Wegener's granulomatosis with renal involvement 03/03/2022       Past Surgical History:   Procedure Laterality Date    APPENDECTOMY      BREAST SURGERY Left     cysts rmeoved    CARDIAC CATHETERIZATION Right 08/12/2022    Procedure: Left Heart Cath and coronary angiogram;  Surgeon: Jak Gavin MD;  Location: [facility] INVASIVE LOCATION;  Service: Cardiology;  Laterality: Right;     CLOSED REDUCTION WRIST FRACTURE Right 03/01/2022    Procedure: WRIST CLOSED REDUCTION;  Surgeon: Gaudencio Townsend MD;  Location: [facility] MAIN OR;  Service: Orthopedics;  Laterality: Right;    CYSTOSCOPY      HIP ARTHROPLASTY      HYSTERECTOMY      LUNG LOBECTOMY Right     TRANSPLANTATION RENAL         Family History   Problem Relation Age of Onset    No Known Problems Mother     No Known Problems Father        Social History     Tobacco Use    Smoking status: Former    Smokeless tobacco: Never   Vaping Use    Vaping status: Former   Substance Use Topics    Alcohol use: Never    Drug use: Never        Home meds:  Prior to Admission medications    Medication Sig Start Date End Date Taking? Authorizing Provider   acetaminophen (Tylenol) 325 MG tablet Take 2 tablets by mouth Every 4 (Four) Hours As Needed for Fever or Mild Pain. 3/13/20  Yes Court Vences MD   apixaban (ELIQUIS) 5 MG tablet tablet Take 1 tablet by mouth Every 12 (Twelve) Hours. 8/16/22  Yes Clyde White, DO   albuterol sulfate  (90 Base) MCG/ACT inhaler Inhale 2 puffs Every 6 (Six) Hours As Needed for Wheezing.    Court Vences MD   Carboxymethylcellulose Sodium (DRY EYE RELIEF OP) Apply 2 drops to eye(s) as directed by provider 2 (Two) Times a Day As Needed (dry eyes). 5/11/22   Court Vences MD   colestipol (COLESTID) 1 g tablet Take 1 tablet by mouth Daily.    Court Vences MD   diazePAM (VALIUM) 5 MG tablet Take 5 mg by mouth 2 (Two) Times a Day As Needed for Anxiety.    Court Vences MD   Diclofenac Sodium (Aspercreme Arthritis Pain) 1 % gel gel Apply 4 g topically to the appropriate area as directed 2 (Two) Times a Day As Needed (pain). 10/21/20   Court Vences MD   DULoxetine HCl 40 MG capsule delayed-release particles Take 1 capsule by mouth Daily.    Court Vences MD   furosemide (LASIX) 40 MG tablet Take 40 mg by mouth 2 (Two) Times a Day.    Court Vences MD    guaiFENesin (Mucinex) 600 MG 12 hr tablet Take 1,200 mg by mouth 2 (Two) Times a Day. 8/16/22   Court Vences MD   levothyroxine (SYNTHROID, LEVOTHROID) 50 MCG tablet Take 1 tablet by mouth See Admin Instructions.    Court Vences MD   loperamide (IMODIUM) 2 MG capsule Take 2 mg by mouth 4 (Four) Times a Day As Needed for Diarrhea. 3/13/20   Court Vences MD   metoprolol succinate XL (TOPROL-XL) 50 MG 24 hr tablet Take 50 mg by mouth Daily.    Court Vences MD   Jeqqd-Hgyxs-Tohszvy-Pramoxine (Neosporin + Pain Relief Max St) 1 % ointment Apply 1 application topically to the appropriate area as directed 2 (Two) Times a Day As Needed (sores). 7/2/20   Court Vences MD   O2 (OXYGEN) Inhale 2 L/min Continuous. 7/2/20   Court Vences MD   potassium chloride (K-DUR,KLOR-CON) 20 MEQ CR tablet Take 1 tablet by mouth Daily. 5/31/21   Court Vences MD   predniSONE (DELTASONE) 5 MG tablet Take 5 mg by mouth Daily.    Court Vences MD   Salicylic Acid-Urea (KERASAL EX) Apply 1 application topically to the appropriate area as directed 2 (Two) Times a Day As Needed (dry feet). 11/23/19   Court Vences MD   simvastatin (ZOCOR) 20 MG tablet Take 20 mg by mouth Every Night.    Court Vences MD   tacrolimus (PROGRAF) 1 MG capsule Take 1 mg by mouth 2 (Two) Times a Day.    Court Vences MD   Tolnaftate 1 % gel Apply 1 application topically to the appropriate area as directed 2 (Two) Times a Day As Needed (ingrown toenail). 11/30/20   Court Vences MD       Allergies:  Zolpidem    Scheduled Meds:apixaban, 5 mg, Oral, Q12H  dilTIAZem, 90 mg, Oral, Q8H  DULoxetine, 40 mg, Oral, Daily  ferrous sulfate, 324 mg, Oral, Daily With Breakfast  furosemide, 80 mg, Intravenous, BID  guaiFENesin, 1,200 mg, Oral, BID  levothyroxine, 50 mcg, Oral, Q AM  meropenem, 500 mg, Intravenous, Q6H  metoprolol succinate XL, 50 mg, Oral, Daily  nystatin, , Topical,  "Q12H  pantoprazole, 40 mg, Intravenous, Q AM  potassium chloride ER, 40 mEq, Oral, Q4H  predniSONE, 5 mg, Oral, Daily  sodium chloride, 10 mL, Intravenous, Q12H  tacrolimus, 1 mg, Oral, BID      Continuous Infusions:Pharmacy to Dose meropenem (MERREM),       PRN Meds:.  acetaminophen    albuterol    senna-docusate sodium **AND** polyethylene glycol **AND** bisacodyl **AND** bisacodyl    Calcium Replacement - Follow Nurse / BPA Driven Protocol    carboxymethylcellulose sod    diazePAM    Diclofenac Sodium    HYDROcodone-acetaminophen    loperamide    Magnesium Standard Dose Replacement - Follow Nurse / BPA Driven Protocol    mineral oil-hydrophilic petrolatum    nitroglycerin    ondansetron    Pharmacy to Dose meropenem (MERREM)    Phosphorus Replacement - Follow Nurse / BPA Driven Protocol    Potassium Replacement - Follow Nurse / BPA Driven Protocol    [COMPLETED] Insert Peripheral IV **AND** sodium chloride    sodium chloride    sodium chloride      OBJECTIVE    Vital Signs  Vitals:    05/20/24 1706 05/20/24 1910 05/20/24 2333 05/21/24 0430   BP: 120/57 142/74 133/65 139/66   BP Location: Left arm Left arm Left arm Left arm   Patient Position: Lying Lying Lying Lying   Pulse: 69 90 78 80   Resp: 14 20 20 22   Temp: 97.8 °F (36.6 °C) 97.8 °F (36.6 °C) 97.8 °F (36.6 °C) 98 °F (36.7 °C)   TempSrc: Oral Oral Oral Oral   SpO2: 94% 95% 99% 93%   Weight:    88.6 kg (195 lb 5.2 oz)   Height:           Flowsheet Rows      Flowsheet Row First Filed Value   Admission Height 167.6 cm (66\") Documented at 05/17/2024 2150   Admission Weight 80.7 kg (178 lb) Documented at 05/17/2024 2150              Intake/Output Summary (Last 24 hours) at 5/21/2024 0739  Last data filed at 5/21/2024 0500  Gross per 24 hour   Intake 480 ml   Output 1200 ml   Net -720 ml          Telemetry: Atrial fibrillation with slow heart rate.    Physical Exam:  The patient is alert, oriented and in no distress.  Obese  Vital signs as noted above.  Head and " neck revealed no carotid bruits or jugular venous distention.  No thyromegaly or lymphadenopathy is present  Lungs clear.  No wheezing.  Breath sounds are normal bilaterally.  Heart normal first and second heart sounds.  No murmur. No precordial rub is present.  No gallop is present.  Abdomen soft and nontender.  No organomegaly is present.  Extremities with good peripheral pulses without any pedal edema.  Skin warm and dry.  Musculoskeletal system is grossly normal.  CNS grossly normal.       Results Review:  I have personally reviewed the results from the time of this admission to 5/21/2024 07:39 EDT and agree with these findings:  []  Laboratory  []  Microbiology  []  Radiology  []  EKG/Telemetry   []  Cardiology/Vascular   []  Pathology  []  Old records  []  Other:    Most notable findings include:    Lab Results (last 24 hours)       Procedure Component Value Units Date/Time    Comprehensive Metabolic Panel [[id number]]  (Abnormal) Collected: 05/21/24 0145    Specimen: Blood Updated: 05/21/24 0236     Glucose 111 mg/dL      BUN 16 mg/dL      Creatinine 0.88 mg/dL      Sodium 142 mmol/L      Potassium 3.4 mmol/L      Chloride 101 mmol/L      CO2 32.0 mmol/L      Calcium 9.6 mg/dL      Total Protein 6.3 g/dL      Albumin 3.9 g/dL      ALT (SGPT) 10 U/L      AST (SGOT) 18 U/L      Alkaline Phosphatase 56 U/L      Total Bilirubin 0.8 mg/dL      Globulin 2.4 gm/dL      A/G Ratio 1.6 g/dL      BUN/Creatinine Ratio 18.2     Anion Gap 9.0 mmol/L      eGFR 67.8 mL/min/1.73     Narrative:      GFR Normal >60  Chronic Kidney Disease <60  Kidney Failure <15    The GFR formula is only valid for adults with stable renal function between ages 18 and 70.    CBC & Differential [[id number]]  (Abnormal) Collected: 05/21/24 0145    Specimen: Blood Updated: 05/21/24 0216    Narrative:      The following orders were created for panel order CBC & Differential.  Procedure                               Abnormality         Status                      ---------                               -----------         ------                     CBC Auto Differential[[id number]]        Abnormal            Final result                 Please view results for these tests on the individual orders.    CBC Auto Differential [[id number]]  (Abnormal) Collected: 05/21/24 0145    Specimen: Blood Updated: 05/21/24 0216     WBC 6.64 10*3/mm3      RBC 3.42 10*6/mm3      Hemoglobin 10.0 g/dL      Hematocrit 34.0 %      MCV 99.4 fL      MCH 29.2 pg      MCHC 29.4 g/dL      RDW 17.1 %      RDW-SD 61.8 fl      MPV 9.6 fL      Platelets 121 10*3/mm3      Neutrophil % 77.2 %      Lymphocyte % 8.6 %      Monocyte % 11.3 %      Eosinophil % 2.0 %      Basophil % 0.3 %      Immature Grans % 0.6 %      Neutrophils, Absolute 5.13 10*3/mm3      Lymphocytes, Absolute 0.57 10*3/mm3      Monocytes, Absolute 0.75 10*3/mm3      Eosinophils, Absolute 0.13 10*3/mm3      Basophils, Absolute 0.02 10*3/mm3      Immature Grans, Absolute 0.04 10*3/mm3      nRBC 0.0 /100 WBC     Blood Culture - Blood, Arm, Left [[id number]]  (Normal) Collected: 05/18/24 0057    Specimen: Blood from Arm, Left Updated: 05/21/24 0116     Blood Culture No growth at 3 days    Blood Culture - Blood, Arm, Left [[id number]]  (Normal) Collected: 05/18/24 0057    Specimen: Blood from Arm, Left Updated: 05/21/24 0116     Blood Culture No growth at 3 days    Urine Culture - Urine, Straight Cath [[id number]]  (Abnormal)  (Susceptibility) Collected: 05/17/24 2245    Specimen: Urine from Straight Cath Updated: 05/20/24 1047     Urine Culture >100,000 CFU/mL Escherichia coli ESBL     Comment:   Consider infectious disease consult.  Susceptibility results may not correlate to clinical outcomes.       Narrative:      Colonization of the urinary tract without infection is common. Treatment is discouraged unless the patient is symptomatic, pregnant, or undergoing an invasive urologic procedure.  Recent outcomes data supports the use of  pip/tazo in the treatment of susceptible ESBL infections for uncomplicated UTI. Consider use of pip/tazo as a carbapenem-sparing regimen in applicable patients.    Susceptibility        Escherichia coli ESBL      ROBERT      Ertapenem Susceptible      Gentamicin Susceptible      Levofloxacin Resistant      Meropenem Susceptible      Nitrofurantoin Susceptible      Piperacillin + Tazobactam Susceptible      Trimethoprim + Sulfamethoxazole Resistant                                   Imaging Results (Last 24 Hours)       ** No results found for the last 24 hours. **            LAB RESULTS (LAST 7 DAYS)    CBC  Results from last 7 days   Lab Units 05/21/24 0145 05/20/24 0337 05/19/24 0521 05/18/24 0509 05/17/24  2220   WBC 10*3/mm3 6.64 6.18 6.70 9.01 9.75   RBC 10*6/mm3 3.42* 3.16* 3.45* 3.62* 3.91   HEMOGLOBIN g/dL 10.0* 9.2* 9.9* 10.4* 11.4*   HEMATOCRIT % 34.0 31.4* 33.6* 35.3 38.8   MCV fL 99.4* 99.4* 97.4* 97.5* 99.2*   PLATELETS 10*3/mm3 121* 120* 128* 116* 125*       BMP  Results from last 7 days   Lab Units 05/21/24 0145 05/20/24 0337 05/19/24 0521 05/18/24 0509 05/17/24  2220   SODIUM mmol/L 142 142 140 141 141   POTASSIUM mmol/L 3.4* 3.8 4.0 5.1 6.0*   CHLORIDE mmol/L 101 104 104 105 104   CO2 mmol/L 32.0* 28.8 29.0 25.0 21.0*   BUN mg/dL 16 15 20 22 22   CREATININE mg/dL 0.88 0.74 0.75 0.86 0.97   GLUCOSE mg/dL 111* 113* 117* 116* 145*       CMP   Results from last 7 days   Lab Units 05/21/24 0145 05/20/24 0337 05/19/24 0521 05/18/24 0509 05/17/24  2220   SODIUM mmol/L 142 142 140 141 141   POTASSIUM mmol/L 3.4* 3.8 4.0 5.1 6.0*   CHLORIDE mmol/L 101 104 104 105 104   CO2 mmol/L 32.0* 28.8 29.0 25.0 21.0*   BUN mg/dL 16 15 20 22 22   CREATININE mg/dL 0.88 0.74 0.75 0.86 0.97   GLUCOSE mg/dL 111* 113* 117* 116* 145*   ALBUMIN g/dL 3.9 3.3* 3.5 3.9 4.0   BILIRUBIN mg/dL 0.8 0.6 0.9 1.4* 2.5*   ALK PHOS U/L 56 52 51 58 65   AST (SGOT) U/L 18 16 16 22 24   ALT (SGPT) U/L 10 9 9 10 12       BNP         TROPONIN  Results from last 7 days   Lab Units 05/18/24  0057 05/17/24  2220   CK TOTAL U/L  --  208*   HSTROP T ng/L 30* 29*       CoAg        Creatinine Clearance  Estimated Creatinine Clearance: 60 mL/min (by C-G formula based on SCr of 0.88 mg/dL).    ABG  Results from last 7 days   Lab Units 05/17/24  2309   PH, ARTERIAL pH units 7.321*   PCO2, ARTERIAL mm Hg 41.2   PO2 ART mm Hg 105.4   O2 SATURATION ART % 97.6   BASE EXCESS ART mmol/L -4.6*       Radiology  CT Chest Without Contrast Diagnostic    Result Date: 5/19/2024  Minimal interval enlargement of the tissue surrounding the postsurgical changes associated with small bilateral pleural effusions. Enlargement of the main pulmonary artery suggesting pulmonary arterial hypertension. Electronically Signed: Adalid Silevrio MD  5/19/2024 7:58 PM EDT  Workstation ID: [id number]       EKG  I personally viewed and interpreted the patient's EKG/Telemetry data:  ECG 12 Lead Bradycardia   Final Result   HEART RATE= 60  bpm   RR Interval= 1005  ms   MD Interval=   ms   P Horizontal Axis=   deg   P Front Axis=   deg   QRSD Interval= 85  ms   QT Interval=   ms   QTcB= Invalid  ms   QRS Axis= -23  deg   T Wave Axis= 119  deg   - ABNORMAL ECG -   Atrial fibrillation   Nonspecific T abnrm, anterolateral leads   When compared with ECG of 17-May-2024 22:05:58,   Significant rate decrease   Significant repolarization change   Significant axis, voltage or hypertrophy change   Electronically Signed By: Marianna Covarrubias ([facility]) 20-May-2024 15:41:27   Date and Time of Study: 2024-05-20 09:37:54      ECG 12 Lead Dyspnea   Final Result   HEART RATE= 108  bpm   RR Interval= 556  ms   MD Interval=   ms   P Horizontal Axis=   deg   P Front Axis=   deg   QRSD Interval= 79  ms   QT Interval= 333  ms   QTcB= 447  ms   QRS Axis= -33  deg   T Wave Axis= 64  deg   - ABNORMAL ECG -   Atrial fibrillation   Ventricular premature complex   Left axis deviation   Low voltage, precordial leads    Electronically Signed By: Baldev Ramirez (Derek) 18-May-2024 06:19:24   Date and Time of Study: 2024-05-17 22:05:58      Telemetry Scan   Final Result      Telemetry Scan   Final Result      Telemetry Scan   Final Result      Telemetry Scan   Final Result      Telemetry Scan   Final Result      Telemetry Scan   Final Result      Telemetry Scan   Final Result      Telemetry Scan   Final Result      Telemetry Scan   Final Result      Telemetry Scan   Final Result      Telemetry Scan   Final Result            Echocardiogram:    Results for orders placed during the hospital encounter of 05/17/24    Adult Transthoracic Echo Complete W/ Cont if Necessary Per Protocol    Interpretation Summary    Left ventricular systolic function is normal. Calculated left ventricular EF = 55% Left ventricular ejection fraction appears to be 51 - 55%.    Left ventricular diastolic function was indeterminate.    Left atrial volume is moderately increased.    The right atrial cavity is dilated.    Severe tricuspid valve regurgitation is present.    Estimated right ventricular systolic pressure from tricuspid regurgitation is markedly elevated (>55 mmHg).    Mild to moderate mitral valve regurgitation is present        Stress Test:         Cardiac Catheterization:  Results for orders placed during the hospital encounter of 08/11/22    Cardiac Catheterization/Vascular Study    Conclusion  IMPRESSIONS  Non obstructive CAD         Other:         ASSESSMENT & PLAN:    Principal Problem:    Acute UTI  Active Problems:    COPD (chronic obstructive pulmonary disease)    Atrial fibrillation with rapid ventricular response    Volume overload    Fall    Hypothyroidism, unspecified    Hyperlipidemia    History of lobectomy of lung    History of kidney transplant    History of lung cancer    Nonobstructive atherosclerosis of coronary artery    History of DVT (deep vein thrombosis)    Pulmonary hypertension    Chronic hypoxemic respiratory failure     Essential (primary) hypertension    Paroxysmal atrial fibrillation    Valvular heart disease    Atrial fibrillation  [id number] score is 5  Continue Eliquis for stroke prevention  On Toprol-XL and Cardizem for rate control  TSH is normal    HFpEF/Pulmonary hypertension  Presented with proBNP of more than 8000  Continue IV diuretics per nephrology.  Monitor I's and O's and replace electrolytes as needed  Continue Toprol-XL  Start Jardiance  Echocardiogram shows preserved LV function with severe tricuspid valve regurgitation and severe pulmonary hypertension.    UTI  Currently on treatment with antibiotics    History of kidney transplant  On prednisone and Prograf  Creatinine 0.88, GFR is 67.8.  Continue IV diuretics per nephrology with plans to switch to oral tomorrow.    COPD  History of lung cancer  CT shows minimal interval enlargement of the tissue surrounding the postsurgical changes associated with small bilateral pleural effusions.  Continue bronchodilators.  She should follow-up with an oncologist outpatient.    Obesity  Lifestyle modifications recommended to the patient.  BMI is 33      Jak Gavin MD  05/21/24  07:40 EDT

## 2024-05-21 NOTE — PLAN OF CARE
"Goal Outcome Evaluation:       Assessment: Jaja Carcamo presents with functional mobility impairments which indicate the need for skilled intervention. Pure wick was removed from pt and pt was encouraged that she needs to start using BSC. A pull up brief was donned on pt. Pt reports she does not like to be pushed and that is why she does not want to go to rehab. Pt has edematous weeping lower legs that she is not able to reach to care for presently. Pt has poor awareness of her functional deficits. Pt required max cues for initiation and to do as much for herself as possible. Presently, pt requiring assist of 2 for safe mobility. PT continues to recommend SNF when pt medically appropriate to D/C from hospital.  Tolerating session today without incident. Will continue to follow and progress as tolerated.     Plan/Recommendations:   If medically appropriate, Moderate Intensity Therapy recommended post-acute care. This is recommended as therapy feels the patient would require 3-4 days per week and wouldn't tolerate \"3 hour daily\" rehab intensity. SNF would be the preferred choice. If the patient does not agree to SNF, arrange HH or OP depending on home bound status. If patient is medically complex, consider LTACH. Pt requires no DME at discharge.     Pt desires Home at discharge. Pt cooperative; agreeable to therapeutic recommendations and plan of care.                                            "

## 2024-05-21 NOTE — CONSULTS
Infectious Diseases Consult Note    Referring Provider: Nilsa Lomeli MD    Reason for Consultation: ESBL UTI    Patient Care Team:  Jonathan Luna APRN as PCP - General (Family Medicine)  Jak Gavin MD as Cardiologist (Cardiology)    Chief complaint shortness of breath, fall before admission, dysuria    Subjective       History of present illness:      This is a 79-year-old female presents to the hospital on 5/17/2024 with complaints of shortness of breath.  Patient states she fell on the floor and was there for over 24 hours before her son found her.  Complains of some chills without fevers continues to have some shortness of breath without a productive cough.  She denies GI issues but is still having some dysuria.  States that she has had multiple UTIs over the last year.  Did have a kidney transplant 2017 is on prednisone and Prograf.    Review of Systems   Review of Systems   Constitutional:  Positive for chills.   HENT: Negative.     Eyes: Negative.    Respiratory:  Positive for shortness of breath.    Cardiovascular: Negative.    Gastrointestinal: Negative.    Endocrine: Negative.    Genitourinary:  Positive for dysuria.   Musculoskeletal: Negative.    Skin: Negative.    Neurological:  Positive for weakness.   Psychiatric/Behavioral: Negative.     All other systems reviewed and are negative.      Medications  Medications Prior to Admission   Medication Sig Dispense Refill Last Dose    acetaminophen (Tylenol) 325 MG tablet Take 2 tablets by mouth Every 4 (Four) Hours As Needed for Fever or Mild Pain.       apixaban (ELIQUIS) 5 MG tablet tablet Take 1 tablet by mouth Every 12 (Twelve) Hours. 60 tablet 0     albuterol sulfate  (90 Base) MCG/ACT inhaler Inhale 2 puffs Every 6 (Six) Hours As Needed for Wheezing.       Carboxymethylcellulose Sodium (DRY EYE RELIEF OP) Apply 2 drops to eye(s) as directed by provider 2 (Two) Times a Day As Needed (dry eyes).       colestipol (COLESTID) 1 g tablet Take  1 tablet by mouth Daily.       diazePAM (VALIUM) 5 MG tablet Take 5 mg by mouth 2 (Two) Times a Day As Needed for Anxiety.       Diclofenac Sodium (Aspercreme Arthritis Pain) 1 % gel gel Apply 4 g topically to the appropriate area as directed 2 (Two) Times a Day As Needed (pain).       DULoxetine HCl 40 MG capsule delayed-release particles Take 1 capsule by mouth Daily.       guaiFENesin (Mucinex) 600 MG 12 hr tablet Take 1,200 mg by mouth 2 (Two) Times a Day.       levothyroxine (SYNTHROID, LEVOTHROID) 50 MCG tablet Take 1 tablet by mouth Daily.       loperamide (IMODIUM) 2 MG capsule Take 2 mg by mouth 4 (Four) Times a Day As Needed for Diarrhea.       metoprolol succinate XL (TOPROL-XL) 50 MG 24 hr tablet Take 50 mg by mouth Daily.       Fxfdb-Gjprj-Szhtaib-Pramoxine (Neosporin + Pain Relief Max St) 1 % ointment Apply 1 application topically to the appropriate area as directed 2 (Two) Times a Day As Needed (sores).       potassium chloride (KLOR-CON M10) 10 MEQ CR tablet Take 2 tablets by mouth Daily.       predniSONE (DELTASONE) 5 MG tablet Take 5 mg by mouth Daily.       Salicylic Acid-Urea (KERASAL EX) Apply 1 application topically to the appropriate area as directed 2 (Two) Times a Day As Needed (dry feet).       simvastatin (ZOCOR) 20 MG tablet Take 20 mg by mouth Every Night.       tacrolimus (PROGRAF) 1 MG capsule Take 1 mg by mouth 2 (Two) Times a Day.       Tolnaftate 1 % gel Apply 1 application topically to the appropriate area as directed 2 (Two) Times a Day As Needed (ingrown toenail).          History  Past Medical History:   Diagnosis Date    Allergic rhinitis 04/14/2015    Asthma 08/10/2017    Atrial flutter 05/11/2017    Chronic diarrhea 04/15/2019    Chronic hypoxemic respiratory failure 01/18/2024    Chronic pain 02/03/2015    COPD (chronic obstructive pulmonary disease)     COVID-19 virus detected 08/11/2022    Cytokine release syndrome, grade 1 08/16/2022    Edema of both lower extremities due  to peripheral venous insufficiency 03/12/2021    ESRD on hemodialysis 05/22/2013    Essential (primary) hypertension 03/03/2022    Fracture of ulnar styloid 05/19/2022    Fracture of unspecified carpal bone, right wrist, subsequent encounter for fracture with routine healing 03/03/2022    Gastroesophageal reflux disease 11/12/2020    Gout 08/10/2017    Hearing loss 08/10/2017    History of appendectomy     History of DVT (deep vein thrombosis) 11/12/2020    History of kidney transplant 06/30/2017    History of lobectomy of lung 01/18/2024 03/08/2016:  RIGHT Lower Lobe Mass--> Right Video-assisted thoracoscopy with a moderate-to-large wedge resection of the RLL (by Dr. Haris Mcconnell @ [facility])--> Poorly differentiated carcinoma of the RLL.      History of repair of hip joint 05/21/2013    History of suicide attempt 05/20/2024    Hyperlipidemia 08/11/2014    Hypothyroidism, unspecified 03/03/2022    Infection due to extended spectrum beta lactamase (ESBL) producing bacteria 03/11/2016    No A2K system hx. +ESBL E coli urine on 3/11/16.      Irritable bowel syndrome 04/15/2019    Long term (current) use of immunosuppressive biologic 04/28/2021    Long term current use of anticoagulant therapy 01/18/2024    Malignant neoplasm of lung 08/10/2017    Menopausal flushing 08/30/2021    Mitral valve regurgitation 07/06/2015    Mixed anxiety and depressive disorder 04/30/2015    Non-small cell lung cancer 08/10/2017    Nonobstructive atherosclerosis of coronary artery 06/02/2019 08/12/2022: CATH: Prashant: NSTEMI assoc with Covid-19: LM:-nl;  LAD: diffuse, Ca++; 30%; CIRC: Dominant. Normal; RCA: small; 50% diffuse, proximal and mid-segment.      NSTEMI (non-ST elevated myocardial infarction) 08/11/2022    Obsessive-compulsive disorder 04/15/2019    Osteoarthritis 05/20/2024    Paroxysmal atrial fibrillation 05/11/2017    Paroxysmal supraventricular tachycardia 05/11/2017    Peripheral neuropathy 08/11/2014     Personal history of peptic ulcer disease 11/12/2020    Postoperative anemia due to acute blood loss 05/22/2013    Right wrist fracture 03/01/2022    Seasonal allergies 08/10/2017    Secondary hyperparathyroidism of renal origin 09/14/2015    Tricuspid valve regurgitation 03/15/2017    Ulcer of lower extremity 08/30/2021    Unspecified acute appendicitis 03/03/2022    Valvular heart disease 05/20/2024    Wegener's granulomatosis with renal involvement 03/03/2022     Past Surgical History:   Procedure Laterality Date    APPENDECTOMY      BREAST SURGERY Left     cysts rmeoved    CARDIAC CATHETERIZATION Right 08/12/2022    Procedure: Left Heart Cath and coronary angiogram;  Surgeon: Jak Gavin MD;  Location: [facility] CATH INVASIVE LOCATION;  Service: Cardiology;  Laterality: Right;    CLOSED REDUCTION WRIST FRACTURE Right 03/01/2022    Procedure: WRIST CLOSED REDUCTION;  Surgeon: Gaudencio Townsend MD;  Location: [facility] MAIN OR;  Service: Orthopedics;  Laterality: Right;    CYSTOSCOPY      HIP ARTHROPLASTY      HYSTERECTOMY      LUNG LOBECTOMY Right     TRANSPLANTATION RENAL         Family History  Family History   Problem Relation Age of Onset    No Known Problems Mother     No Known Problems Father        Social History   reports that she has quit smoking. She has never used smokeless tobacco. She reports that she does not drink alcohol and does not use drugs.    Allergies  Zolpidem    Objective     Vital Signs   Vital Signs (last 24 hours)         05/20 0700  05/21 0659 05/21 0700  05/21 1546   Most Recent      Temp (°F) 97.5 -  98    97.1 -  97.2     97.1 (36.2) 05/21 1128    Heart Rate 59 -  90    82 -  90     90 05/21 1128    Resp 14 -  28    17 -  22     17 05/21 1128    /54 -  142/74    120/55 -  131/60     131/60 05/21 1128    SpO2 (%) 93 -  99    91 -  96     91 05/21 1128    Flow (L/min)   3      3     3 05/21 1200            Physical Exam:  Physical Exam  Vitals and nursing note reviewed.    Constitutional:       General: She is not in acute distress.     Appearance: She is well-developed and normal weight. She is ill-appearing. She is not diaphoretic.   HENT:      Head: Normocephalic and atraumatic.   Eyes:      General: No scleral icterus.     Extraocular Movements: Extraocular movements intact.      Conjunctiva/sclera: Conjunctivae normal.      Pupils: Pupils are equal, round, and reactive to light.   Cardiovascular:      Rate and Rhythm: Normal rate and regular rhythm.      Heart sounds: Normal heart sounds, S1 normal and S2 normal. No murmur heard.  Pulmonary:      Effort: Pulmonary effort is normal. No respiratory distress.      Breath sounds: No stridor. Wheezing present. No rales.   Chest:      Chest wall: No tenderness.   Abdominal:      General: Bowel sounds are normal. There is no distension.      Palpations: Abdomen is soft. There is no mass.      Tenderness: There is no abdominal tenderness. There is no guarding.   Musculoskeletal:         General: No swelling, tenderness or deformity.      Cervical back: Neck supple.   Skin:     General: Skin is warm and dry.      Coloration: Skin is not pale.      Findings: Bruising present. No erythema or rash.   Neurological:      Mental Status: She is alert and oriented to person, place, and time.         Microbiology  Microbiology Results (last 10 days)       Procedure Component Value - Date/Time    Blood Culture - Blood, Arm, Left [[id number]]  (Normal) Collected: 05/18/24 0057    Lab Status: Preliminary result Specimen: Blood from Arm, Left Updated: 05/21/24 0116     Blood Culture No growth at 3 days    Blood Culture - Blood, Arm, Left [[id number]]  (Normal) Collected: 05/18/24 0057    Lab Status: Preliminary result Specimen: Blood from Arm, Left Updated: 05/21/24 0116     Blood Culture No growth at 3 days    Urine Culture - Urine, Straight Cath [[id number]]  (Abnormal)  (Susceptibility) Collected: 05/17/24 6825    Lab Status: Final result Specimen:  Urine from Straight Cath Updated: 05/20/24 1047     Urine Culture >100,000 CFU/mL Escherichia coli ESBL     Comment:   Consider infectious disease consult.  Susceptibility results may not correlate to clinical outcomes.       Narrative:      Colonization of the urinary tract without infection is common. Treatment is discouraged unless the patient is symptomatic, pregnant, or undergoing an invasive urologic procedure.  Recent outcomes data supports the use of pip/tazo in the treatment of susceptible ESBL infections for uncomplicated UTI. Consider use of pip/tazo as a carbapenem-sparing regimen in applicable patients.    Susceptibility        Escherichia coli ESBL      ROBERT      Ertapenem Susceptible      Gentamicin Susceptible      Levofloxacin Resistant      Meropenem Susceptible      Nitrofurantoin Susceptible      Piperacillin + Tazobactam Susceptible      Trimethoprim + Sulfamethoxazole Resistant                           Respiratory Panel PCR w/COVID-19(SARS-CoV-2) HODAN/MARIA M/TWILA/PAD/COR/JENISE In-House, NP Swab in UTM/VTM, 2 HR TAT - Swab, Nasopharynx [[id number]]  (Normal) Collected: 05/17/24 2216    Lab Status: Final result Specimen: Swab from Nasopharynx Updated: 05/17/24 2312     ADENOVIRUS, PCR Not Detected     Coronavirus 229E Not Detected     Coronavirus HKU1 Not Detected     Coronavirus NL63 Not Detected     Coronavirus OC43 Not Detected     COVID19 Not Detected     Human Metapneumovirus Not Detected     Human Rhinovirus/Enterovirus Not Detected     Influenza A PCR Not Detected     Influenza B PCR Not Detected     Parainfluenza Virus 1 Not Detected     Parainfluenza Virus 2 Not Detected     Parainfluenza Virus 3 Not Detected     Parainfluenza Virus 4 Not Detected     RSV, PCR Not Detected     Bordetella pertussis pcr Not Detected     Bordetella parapertussis PCR Not Detected     Chlamydophila pneumoniae PCR Not Detected     Mycoplasma pneumo by PCR Not Detected    Narrative:      In the setting of a positive  respiratory panel with a viral infection PLUS a negative procalcitonin without other underlying concern for bacterial infection, consider observing off antibiotics or discontinuation of antibiotics and continue supportive care. If the respiratory panel is positive for atypical bacterial infection (Bordetella pertussis, Chlamydophila pneumoniae, or Mycoplasma pneumoniae), consider antibiotic de-escalation to target atypical bacterial infection.            Laboratory  Results from last 7 days   Lab Units 05/21/24  0145   WBC 10*3/mm3 6.64   HEMOGLOBIN g/dL 10.0*   HEMATOCRIT % 34.0   PLATELETS 10*3/mm3 121*     Results from last 7 days   Lab Units 05/21/24  0145   SODIUM mmol/L 142   POTASSIUM mmol/L 3.4*   CHLORIDE mmol/L 101   CO2 mmol/L 32.0*   BUN mg/dL 16   CREATININE mg/dL 0.88   GLUCOSE mg/dL 111*   CALCIUM mg/dL 9.6     Results from last 7 days   Lab Units 05/21/24  0145   SODIUM mmol/L 142   POTASSIUM mmol/L 3.4*   CHLORIDE mmol/L 101   CO2 mmol/L 32.0*   BUN mg/dL 16   CREATININE mg/dL 0.88   GLUCOSE mg/dL 111*   CALCIUM mg/dL 9.6     Results from last 7 days   Lab Units 05/17/24  2220   CK TOTAL U/L 208*               Radiology  Imaging Results (Last 72 Hours)       Procedure Component Value Units Date/Time    CT Abdomen Pelvis Stone Protocol [[id number]] Resulted: 05/21/24 1539     Updated: 05/21/24 1537    CT Chest Without Contrast Diagnostic [[id number]] Collected: 05/19/24 1954     Updated: 05/19/24 2000    Narrative:      CT CHEST WO CONTRAST DIAGNOSTIC    Date of Exam: 5/19/2024 2:11 PM EDT    Indication: h/o lung cancer.    Comparison: 1/22/2024    Technique: Axial CT images were obtained of the chest without contrast administration.  Sagittal and coronal reconstructions were performed.  Automated exposure control and iterative reconstruction methods were used.    Findings: Emphysema. Left upper lobe calcified granuloma. Small bilateral effusions. Cardiomegaly. Postsurgical changes within the right  lower lobe. There has been slight interval enlargement of the soft tissue surrounding the postsurgical changes in the   right lower lobe of unknown relation to the patient's history of malignancy or the presence of pleural effusions. On axial image #56 this measures 2.5 cm x 2.7 cm compared with 2.3 cm x 2.3 cm previously representing minimal interval enlargement. No   pneumothorax. Atheromatous disease of the coronary vessels. No adenopathy. The main pulmonary artery measures 4.1 cm in diameter unchanged compared with the prior study consistent with pulmonary arterial hypertension.    2.3 cm x 1.7 cm stable left adrenal nodule. Bilateral renal atrophy. Right renal cyst. Cholelithiasis. Healed right-sided rib fractures. Inflammation of the right lateral chest wall.      Impression:      Minimal interval enlargement of the tissue surrounding the postsurgical changes associated with small bilateral pleural effusions. Enlargement of the main pulmonary artery suggesting pulmonary arterial hypertension.      Electronically Signed: Adalid Silverio MD    5/19/2024 7:58 PM EDT    Workstation ID: [id number]            Cardiology      Results Review:  I have reviewed all clinical data, test, lab, and imaging results.       Schedule Meds  apixaban, 5 mg, Oral, Q12H  dilTIAZem, 90 mg, Oral, Q8H  DULoxetine, 40 mg, Oral, Daily  ferrous sulfate, 324 mg, Oral, Daily With Breakfast  furosemide, 80 mg, Intravenous, BID  [START ON 5/22/2024] furosemide, 80 mg, Oral, BID  guaiFENesin, 1,200 mg, Oral, BID  levothyroxine, 50 mcg, Oral, Q AM  meropenem, 500 mg, Intravenous, Q6H  metoprolol succinate XL, 50 mg, Oral, Daily  nystatin, , Topical, Q12H  pantoprazole, 40 mg, Intravenous, Q AM  predniSONE, 5 mg, Oral, Daily  sodium chloride, 10 mL, Intravenous, Q12H  tacrolimus, 1 mg, Oral, BID        Infusion Meds  Pharmacy to Dose meropenem (MERREM),         PRN Meds    acetaminophen    albuterol    senna-docusate sodium **AND** polyethylene  glycol **AND** bisacodyl **AND** bisacodyl    Calcium Replacement - Follow Nurse / BPA Driven Protocol    carboxymethylcellulose sod    diazePAM    Diclofenac Sodium    HYDROcodone-acetaminophen    loperamide    Magnesium Standard Dose Replacement - Follow Nurse / BPA Driven Protocol    mineral oil-hydrophilic petrolatum    nitroglycerin    ondansetron    Pharmacy to Dose meropenem (MERREM)    Phosphorus Replacement - Follow Nurse / BPA Driven Protocol    Potassium Replacement - Follow Nurse / BPA Driven Protocol    [COMPLETED] Insert Peripheral IV **AND** sodium chloride    sodium chloride    sodium chloride      Assessment & Plan       Assessment    ESBL  E. coli UTI.  Patient has had multiple UTIs over the last year.    Blood cultures have been negative so far.  CT ordered to rule out obstructive uropathy with no acute findings    Of COPD chronically on 4 L oxygen at home.  Currently on 3 liters    History of kidney transplant 2017 due to granulomatosis polyangiitis.  Currently on prednisone and Prograf    History of lung cancer    History of DVT    Plan    Discontinue IV meropenem  Will give 1 dose of p.o. fosfomycin 3 g  Continue supportive care  A.mThomas labs        Erlinda Mccabe, APRN  05/21/24  15:46 EDT    Note is dictated utilizing voice recognition software/Dragon

## 2024-05-21 NOTE — PLAN OF CARE
Goal Outcome Evaluation:      Rested well during the shift. O2 at 3L in use. External catheter in use. Good urine output after Lasix given.  Potassium 3.4. Replacement started. No c/o discomfort. Will continue to monitor.

## 2024-05-21 NOTE — PROGRESS NOTES
LOS: 3 days   Patient Care Team:  Jonathan Luna APRN as PCP - General (Family Medicine)  Jak Gavin MD as Cardiologist (Cardiology)    Subjective     Interval History: Stable overnight    Patient Complaints: Complains of constipation    History taken from: patient    Review of Systems   Constitutional:  Positive for activity change and fatigue.   Respiratory:  Positive for shortness of breath.    Cardiovascular:  Positive for leg swelling. Negative for chest pain.   Gastrointestinal:  Positive for constipation.   Genitourinary:  Positive for frequency.   Musculoskeletal:  Positive for arthralgias.   Neurological:  Positive for weakness.           Objective     Vital Signs  Temp:  [97.5 °F (36.4 °C)-98 °F (36.7 °C)] 98 °F (36.7 °C)  Heart Rate:  [69-90] 80  Resp:  [14-22] 22  BP: (120-142)/(57-74) 139/66    Physical Exam:     General Appearance:    Alert, cooperative, in no acute distress, ill but not toxic   Head:    Normocephalic, without obvious abnormality, atraumatic   Eyes:            Lids and lashes normal, conjunctivae and sclerae normal, no   icterus, no pallor, corneas clear, PERRLA   Ears:    Ears appear intact with no abnormalities noted   Throat:   No oral lesions, no thrush, oral mucosa moist   Neck:   No adenopathy, supple, trachea midline, no thyromegaly, no   carotid bruit, no JVD   Lungs:     Clear to auscultation,respirations regular, even and                  unlabored    Heart:    Irreg irreg   Chest Wall:    No abnormalities observed   Abdomen:     Normal bowel sounds, no masses, no organomegaly, soft        non-tender, non-distended, no guarding, no rebound                tenderness   Extremities:   Moves all extremities well, 2+ edema, no cyanosis, no             redness   Pulses:   Pulses palpable and equal bilaterally   Skin:   No bleeding, bruising or rash   Lymph nodes:   No palpable adenopathy   Neurologic:   Cranial nerves 2 - 12 grossly intact, sensation intact, DTR       present  and equal bilaterally        Results Review:    Lab Results (last 24 hours)       Procedure Component Value Units Date/Time    Comprehensive Metabolic Panel [[id number]]  (Abnormal) Collected: 05/21/24 0145    Specimen: Blood Updated: 05/21/24 0236     Glucose 111 mg/dL      BUN 16 mg/dL      Creatinine 0.88 mg/dL      Sodium 142 mmol/L      Potassium 3.4 mmol/L      Chloride 101 mmol/L      CO2 32.0 mmol/L      Calcium 9.6 mg/dL      Total Protein 6.3 g/dL      Albumin 3.9 g/dL      ALT (SGPT) 10 U/L      AST (SGOT) 18 U/L      Alkaline Phosphatase 56 U/L      Total Bilirubin 0.8 mg/dL      Globulin 2.4 gm/dL      A/G Ratio 1.6 g/dL      BUN/Creatinine Ratio 18.2     Anion Gap 9.0 mmol/L      eGFR 67.8 mL/min/1.73     Narrative:      GFR Normal >60  Chronic Kidney Disease <60  Kidney Failure <15    The GFR formula is only valid for adults with stable renal function between ages 18 and 70.    CBC & Differential [[id number]]  (Abnormal) Collected: 05/21/24 0145    Specimen: Blood Updated: 05/21/24 0216    Narrative:      The following orders were created for panel order CBC & Differential.  Procedure                               Abnormality         Status                     ---------                               -----------         ------                     CBC Auto Differential[[id number]]        Abnormal            Final result                 Please view results for these tests on the individual orders.    CBC Auto Differential [[id number]]  (Abnormal) Collected: 05/21/24 0145    Specimen: Blood Updated: 05/21/24 0216     WBC 6.64 10*3/mm3      RBC 3.42 10*6/mm3      Hemoglobin 10.0 g/dL      Hematocrit 34.0 %      MCV 99.4 fL      MCH 29.2 pg      MCHC 29.4 g/dL      RDW 17.1 %      RDW-SD 61.8 fl      MPV 9.6 fL      Platelets 121 10*3/mm3      Neutrophil % 77.2 %      Lymphocyte % 8.6 %      Monocyte % 11.3 %      Eosinophil % 2.0 %      Basophil % 0.3 %      Immature Grans % 0.6 %      Neutrophils, Absolute  5.13 10*3/mm3      Lymphocytes, Absolute 0.57 10*3/mm3      Monocytes, Absolute 0.75 10*3/mm3      Eosinophils, Absolute 0.13 10*3/mm3      Basophils, Absolute 0.02 10*3/mm3      Immature Grans, Absolute 0.04 10*3/mm3      nRBC 0.0 /100 WBC     Blood Culture - Blood, Arm, Left [[id number]]  (Normal) Collected: 05/18/24 0057    Specimen: Blood from Arm, Left Updated: 05/21/24 0116     Blood Culture No growth at 3 days    Blood Culture - Blood, Arm, Left [[id number]]  (Normal) Collected: 05/18/24 0057    Specimen: Blood from Arm, Left Updated: 05/21/24 0116     Blood Culture No growth at 3 days    Urine Culture - Urine, Straight Cath [[id number]]  (Abnormal)  (Susceptibility) Collected: 05/17/24 2245    Specimen: Urine from Straight Cath Updated: 05/20/24 1047     Urine Culture >100,000 CFU/mL Escherichia coli ESBL     Comment:   Consider infectious disease consult.  Susceptibility results may not correlate to clinical outcomes.       Narrative:      Colonization of the urinary tract without infection is common. Treatment is discouraged unless the patient is symptomatic, pregnant, or undergoing an invasive urologic procedure.  Recent outcomes data supports the use of pip/tazo in the treatment of susceptible ESBL infections for uncomplicated UTI. Consider use of pip/tazo as a carbapenem-sparing regimen in applicable patients.    Susceptibility        Escherichia coli ESBL      ROBERT      Ertapenem Susceptible      Gentamicin Susceptible      Levofloxacin Resistant      Meropenem Susceptible      Nitrofurantoin Susceptible      Piperacillin + Tazobactam Susceptible      Trimethoprim + Sulfamethoxazole Resistant                                    Imaging Results (Last 24 Hours)       ** No results found for the last 24 hours. **                 I reviewed the patient's new clinical results.    Medication Review:   Scheduled Meds:apixaban, 5 mg, Oral, Q12H  dilTIAZem, 90 mg, Oral, Q8H  DULoxetine, 40 mg, Oral, Daily  ferrous  sulfate, 324 mg, Oral, Daily With Breakfast  furosemide, 80 mg, Intravenous, BID  [START ON 5/22/2024] furosemide, 80 mg, Oral, BID  guaiFENesin, 1,200 mg, Oral, BID  levothyroxine, 50 mcg, Oral, Q AM  meropenem, 500 mg, Intravenous, Q6H  metoprolol succinate XL, 50 mg, Oral, Daily  nystatin, , Topical, Q12H  pantoprazole, 40 mg, Intravenous, Q AM  potassium chloride ER, 40 mEq, Oral, Q4H  predniSONE, 5 mg, Oral, Daily  sodium chloride, 10 mL, Intravenous, Q12H  tacrolimus, 1 mg, Oral, BID      Continuous Infusions:Pharmacy to Dose meropenem (MERREM),         PRN Meds:.  acetaminophen    albuterol    senna-docusate sodium **AND** polyethylene glycol **AND** bisacodyl **AND** bisacodyl    Calcium Replacement - Follow Nurse / BPA Driven Protocol    carboxymethylcellulose sod    diazePAM    Diclofenac Sodium    HYDROcodone-acetaminophen    loperamide    Magnesium Standard Dose Replacement - Follow Nurse / BPA Driven Protocol    mineral oil-hydrophilic petrolatum    nitroglycerin    ondansetron    Pharmacy to Dose meropenem (MERREM)    Phosphorus Replacement - Follow Nurse / BPA Driven Protocol    Potassium Replacement - Follow Nurse / BPA Driven Protocol    [COMPLETED] Insert Peripheral IV **AND** sodium chloride    sodium chloride    sodium chloride     Assessment & Plan       Acute UTI    COPD (chronic obstructive pulmonary disease)    Atrial fibrillation with rapid ventricular response    Volume overload    Fall    Hypothyroidism, unspecified    Hyperlipidemia    History of lobectomy of lung    History of kidney transplant    History of lung cancer    Nonobstructive atherosclerosis of coronary artery    History of DVT (deep vein thrombosis)    Pulmonary hypertension    Chronic hypoxemic respiratory failure    Essential (primary) hypertension    Paroxysmal atrial fibrillation    Valvular heart disease    PLAN:  UTI, E. coli ESBL  -Continue Merrem  -ID has been consulted     Afib with RVR  -Off Cardizem drip, continue  Cardizem and metoprolol  - cardiology following  - eliquis     Volume overload, improving  - IV lasix increased by nephrology to 80 mg twice daily, will transition to oral in a.m.  - fluid restriction    H/o kidney transplant   - nephrology following, antirejection medication     S/p fall  -   - CT head and cervical spine unremarkable    History of lung cancer (neuroendocrine carcinoma), S\P wedge resection March 2016  Chest CT shows minimal enlargement of tissue surrounding postsurgical changes, pulmonology following     COPD  HTN  Weakness - PT/OT  Anemia -oral iron  HLD - hold statin because of elevated CK  hypothyroid     DVT prophy - eliquis  Stress ulcer prophy - PPI        Plan for disposition:BILL Stearsn, APRN  05/21/24  08:47 EDT

## 2024-05-21 NOTE — THERAPY TREATMENT NOTE
Subjective: Pt agreeable to therapeutic plan of care.  Cognition: arousal/alertness: Alert, safety/judgement: limited, and awareness of deficits: fair awareness of safety precautions and fair awareness of deficits    Objective:     Bed Mobility: Min-A and with adaptive equipment   Functional Transfers: Mod-A and with rolling walker     Balance: unsupported, dynamic, with UE support, and standing Min-A  Functional Ambulation: Min-A and with rolling walker 3'    Lower Body Dressing: Dependent  ADL Position: edge of bed sitting, supine, and supported standing    Toileting: Dependent  ADL Position: supine  ADL Comments: pt has skin yeast infection in ABD fold. Was not able to clean herself in this area. Pt reports she does not wear underwear at home sometimes, but just closes the blinds and goes without or that she uses an absorbant pad in her underwear. Currently unable to don brief w/o (D) assist.    Vitals: WNL on 4L o2 via HF NC    Pain: 3 VAS  Location: lower legs, tender. Ot cleaned legs, feet, applied lotion prior to donning socks.  Interventions for pain: Repositioned, Increased Activity, and Therapeutic Presence  Education: Provided education on the importance of mobility in the acute care setting, ADL training, and Transfer Training      Assessment: Jaja Carcamo presents with ADL impairments affecting function including balance, endurance / activity tolerance, range of motion (ROM), sensation / sensory awareness, shortness of breath, and strength. Demonstrated functioning below baseline abilities indicate the need for continued skilled intervention while inpatient. Tolerating session today without incident. Pt has acute on chronic Adl deficit, inadequate balance, mobility or activity tolerance for being home alone. Pt continues to decline the recommendation of SNF at d/c. Will continue to follow and progress as tolerated.     Plan/Recommendations:   Moderate Intensity Therapy recommended post-acute care.  "This is recommended as therapy feels the patient would require 3-4 days per week and wouldn't tolerate \"3 hour daily\" rehab intensity. SNF would be the preferred choice. If the patient does not agree to SNF, arrange HH or OP depending on home bound status. If patient is medically complex, consider LTACH.. Pt requires BSC at discharge.     Pt desires Home with Home Health at discharge. Pt cooperative; agreeable to therapeutic recommendations and plan of care.     Modified [place]: 4 = Moderately severe disability (Unable to attend to own bodily needs without assistance, and unable to walk unassisted)     Post-Tx Position: Up in Chair, Alarms activated, and Call light and personal items within reach  PPE: gloves    "

## 2024-05-22 ENCOUNTER — READMISSION MANAGEMENT (OUTPATIENT)
Dept: CALL CENTER | Facility: HOSPITAL | Age: 79
End: 2024-05-22
Payer: MEDICARE

## 2024-05-22 ENCOUNTER — APPOINTMENT (OUTPATIENT)
Dept: GENERAL RADIOLOGY | Facility: HOSPITAL | Age: 79
End: 2024-05-22
Payer: MEDICARE

## 2024-05-22 VITALS
OXYGEN SATURATION: 98 % | HEIGHT: 66 IN | SYSTOLIC BLOOD PRESSURE: 158 MMHG | WEIGHT: 196.87 LBS | RESPIRATION RATE: 18 BRPM | DIASTOLIC BLOOD PRESSURE: 88 MMHG | BODY MASS INDEX: 31.64 KG/M2 | TEMPERATURE: 97.9 F | HEART RATE: 73 BPM

## 2024-05-22 LAB
ALBUMIN SERPL-MCNC: 3.8 G/DL (ref 3.5–5.2)
ALBUMIN/GLOB SERPL: 1.5 G/DL
ALP SERPL-CCNC: 58 U/L (ref 39–117)
ALT SERPL W P-5'-P-CCNC: 8 U/L (ref 1–33)
ANION GAP SERPL CALCULATED.3IONS-SCNC: 8 MMOL/L (ref 5–15)
AST SERPL-CCNC: 13 U/L (ref 1–32)
BASOPHILS # BLD AUTO: 0.03 10*3/MM3 (ref 0–0.2)
BASOPHILS NFR BLD AUTO: 0.5 % (ref 0–1.5)
BILIRUB SERPL-MCNC: 0.9 MG/DL (ref 0–1.2)
BUN SERPL-MCNC: 16 MG/DL (ref 8–23)
BUN/CREAT SERPL: 21.1 (ref 7–25)
CALCIUM SPEC-SCNC: 9.7 MG/DL (ref 8.6–10.5)
CHLORIDE SERPL-SCNC: 99 MMOL/L (ref 98–107)
CO2 SERPL-SCNC: 35 MMOL/L (ref 22–29)
CREAT SERPL-MCNC: 0.76 MG/DL (ref 0.57–1)
DEPRECATED RDW RBC AUTO: 62 FL (ref 37–54)
EGFRCR SERPLBLD CKD-EPI 2021: 80.8 ML/MIN/1.73
EOSINOPHIL # BLD AUTO: 0.14 10*3/MM3 (ref 0–0.4)
EOSINOPHIL NFR BLD AUTO: 2.1 % (ref 0.3–6.2)
ERYTHROCYTE [DISTWIDTH] IN BLOOD BY AUTOMATED COUNT: 17.2 % (ref 12.3–15.4)
GLOBULIN UR ELPH-MCNC: 2.5 GM/DL
GLUCOSE BLDC GLUCOMTR-MCNC: 156 MG/DL (ref 70–105)
GLUCOSE SERPL-MCNC: 101 MG/DL (ref 65–99)
HCT VFR BLD AUTO: 34.2 % (ref 34–46.6)
HGB BLD-MCNC: 10 G/DL (ref 12–15.9)
IMM GRANULOCYTES # BLD AUTO: 0.04 10*3/MM3 (ref 0–0.05)
IMM GRANULOCYTES NFR BLD AUTO: 0.6 % (ref 0–0.5)
LYMPHOCYTES # BLD AUTO: 0.53 10*3/MM3 (ref 0.7–3.1)
LYMPHOCYTES NFR BLD AUTO: 8 % (ref 19.6–45.3)
MAGNESIUM SERPL-MCNC: 1.5 MG/DL (ref 1.6–2.4)
MCH RBC QN AUTO: 28.8 PG (ref 26.6–33)
MCHC RBC AUTO-ENTMCNC: 29.2 G/DL (ref 31.5–35.7)
MCV RBC AUTO: 98.6 FL (ref 79–97)
MONOCYTES # BLD AUTO: 0.61 10*3/MM3 (ref 0.1–0.9)
MONOCYTES NFR BLD AUTO: 9.2 % (ref 5–12)
NEUTROPHILS NFR BLD AUTO: 5.29 10*3/MM3 (ref 1.7–7)
NEUTROPHILS NFR BLD AUTO: 79.6 % (ref 42.7–76)
NRBC BLD AUTO-RTO: 0 /100 WBC (ref 0–0.2)
PLATELET # BLD AUTO: 131 10*3/MM3 (ref 140–450)
PMV BLD AUTO: 10.2 FL (ref 6–12)
POTASSIUM SERPL-SCNC: 3.6 MMOL/L (ref 3.5–5.2)
PROT SERPL-MCNC: 6.3 G/DL (ref 6–8.5)
RBC # BLD AUTO: 3.47 10*6/MM3 (ref 3.77–5.28)
SODIUM SERPL-SCNC: 142 MMOL/L (ref 136–145)
WBC NRBC COR # BLD AUTO: 6.64 10*3/MM3 (ref 3.4–10.8)

## 2024-05-22 PROCEDURE — 85025 COMPLETE CBC W/AUTO DIFF WBC: CPT | Performed by: NURSE PRACTITIONER

## 2024-05-22 PROCEDURE — 71045 X-RAY EXAM CHEST 1 VIEW: CPT

## 2024-05-22 PROCEDURE — 83735 ASSAY OF MAGNESIUM: CPT | Performed by: INTERNAL MEDICINE

## 2024-05-22 PROCEDURE — 80053 COMPREHEN METABOLIC PANEL: CPT | Performed by: NURSE PRACTITIONER

## 2024-05-22 PROCEDURE — 63710000001 PREDNISONE PER 5 MG: Performed by: NURSE PRACTITIONER

## 2024-05-22 PROCEDURE — 63710000001 TACROLIMUS PER 1 MG: Performed by: NURSE PRACTITIONER

## 2024-05-22 PROCEDURE — 25010000002 ONDANSETRON PER 1 MG: Performed by: NURSE PRACTITIONER

## 2024-05-22 PROCEDURE — 25010000002 MAGNESIUM SULFATE 2 GM/50ML SOLUTION: Performed by: FAMILY MEDICINE

## 2024-05-22 PROCEDURE — 99232 SBSQ HOSP IP/OBS MODERATE 35: CPT | Performed by: INTERNAL MEDICINE

## 2024-05-22 PROCEDURE — 97110 THERAPEUTIC EXERCISES: CPT

## 2024-05-22 PROCEDURE — 82948 REAGENT STRIP/BLOOD GLUCOSE: CPT

## 2024-05-22 PROCEDURE — 97116 GAIT TRAINING THERAPY: CPT

## 2024-05-22 RX ORDER — POTASSIUM CHLORIDE 20 MEQ/1
40 TABLET, EXTENDED RELEASE ORAL DAILY
Qty: 60 TABLET | Refills: 0 | Status: SHIPPED | OUTPATIENT
Start: 2024-05-22

## 2024-05-22 RX ORDER — DILTIAZEM HCL 90 MG
90 TABLET ORAL EVERY 8 HOURS SCHEDULED
Qty: 60 TABLET | Refills: 0 | Status: SHIPPED | OUTPATIENT
Start: 2024-05-22

## 2024-05-22 RX ORDER — FERROUS SULFATE 324(65)MG
324 TABLET, DELAYED RELEASE (ENTERIC COATED) ORAL
Qty: 30 TABLET | Refills: 0 | Status: SHIPPED | OUTPATIENT
Start: 2024-05-23 | End: 2024-05-22

## 2024-05-22 RX ORDER — FUROSEMIDE 80 MG
80 TABLET ORAL 2 TIMES DAILY
Qty: 60 TABLET | Refills: 0 | Status: SHIPPED | OUTPATIENT
Start: 2024-05-22

## 2024-05-22 RX ORDER — FUROSEMIDE 80 MG
80 TABLET ORAL 2 TIMES DAILY
Qty: 60 TABLET | Refills: 0 | Status: SHIPPED | OUTPATIENT
Start: 2024-05-22 | End: 2024-05-22

## 2024-05-22 RX ORDER — FERROUS SULFATE 324(65)MG
324 TABLET, DELAYED RELEASE (ENTERIC COATED) ORAL
Qty: 30 TABLET | Refills: 0 | Status: SHIPPED | OUTPATIENT
Start: 2024-05-23

## 2024-05-22 RX ORDER — MAGNESIUM SULFATE HEPTAHYDRATE 40 MG/ML
2 INJECTION, SOLUTION INTRAVENOUS
Status: COMPLETED | OUTPATIENT
Start: 2024-05-22 | End: 2024-05-22

## 2024-05-22 RX ORDER — DILTIAZEM HCL 90 MG
90 TABLET ORAL EVERY 8 HOURS SCHEDULED
Qty: 60 TABLET | Refills: 0 | Status: SHIPPED | OUTPATIENT
Start: 2024-05-22 | End: 2024-05-22

## 2024-05-22 RX ORDER — POTASSIUM CHLORIDE 20 MEQ/1
40 TABLET, EXTENDED RELEASE ORAL EVERY 4 HOURS
Status: COMPLETED | OUTPATIENT
Start: 2024-05-22 | End: 2024-05-22

## 2024-05-22 RX ORDER — POTASSIUM CHLORIDE 20 MEQ/1
40 TABLET, EXTENDED RELEASE ORAL DAILY
Qty: 60 TABLET | Refills: 0 | Status: SHIPPED | OUTPATIENT
Start: 2024-05-22 | End: 2024-05-22

## 2024-05-22 RX ADMIN — POTASSIUM CHLORIDE 40 MEQ: 1500 TABLET, EXTENDED RELEASE ORAL at 05:29

## 2024-05-22 RX ADMIN — PREDNISONE 5 MG: 5 TABLET ORAL at 08:12

## 2024-05-22 RX ADMIN — DILTIAZEM HYDROCHLORIDE 90 MG: 30 TABLET, FILM COATED ORAL at 13:54

## 2024-05-22 RX ADMIN — DIAZEPAM 5 MG: 5 TABLET ORAL at 13:54

## 2024-05-22 RX ADMIN — ONDANSETRON 4 MG: 2 INJECTION INTRAMUSCULAR; INTRAVENOUS at 08:15

## 2024-05-22 RX ADMIN — LEVOTHYROXINE SODIUM 50 MCG: 0.05 TABLET ORAL at 05:29

## 2024-05-22 RX ADMIN — POTASSIUM CHLORIDE 40 MEQ: 1500 TABLET, EXTENDED RELEASE ORAL at 08:58

## 2024-05-22 RX ADMIN — MAGNESIUM SULFATE HEPTAHYDRATE 2 G: 40 INJECTION, SOLUTION INTRAVENOUS at 08:13

## 2024-05-22 RX ADMIN — Medication 10 ML: at 05:16

## 2024-05-22 RX ADMIN — FUROSEMIDE 80 MG: 40 TABLET ORAL at 08:12

## 2024-05-22 RX ADMIN — MAGNESIUM SULFATE HEPTAHYDRATE 2 G: 40 INJECTION, SOLUTION INTRAVENOUS at 05:16

## 2024-05-22 RX ADMIN — PANTOPRAZOLE SODIUM 40 MG: 40 INJECTION, POWDER, FOR SOLUTION INTRAVENOUS at 05:18

## 2024-05-22 RX ADMIN — Medication 10 ML: at 08:14

## 2024-05-22 RX ADMIN — TACROLIMUS 1 MG: 1 CAPSULE ORAL at 08:11

## 2024-05-22 RX ADMIN — GUAIFENESIN 1200 MG: 600 TABLET, EXTENDED RELEASE ORAL at 08:13

## 2024-05-22 RX ADMIN — MAGNESIUM SULFATE HEPTAHYDRATE 2 G: 40 INJECTION, SOLUTION INTRAVENOUS at 08:56

## 2024-05-22 RX ADMIN — METOPROLOL SUCCINATE 50 MG: 50 TABLET, FILM COATED, EXTENDED RELEASE ORAL at 08:12

## 2024-05-22 RX ADMIN — FERROUS SULFATE TAB EC 324 MG (65 MG FE EQUIVALENT) 324 MG: 324 (65 FE) TABLET DELAYED RESPONSE at 08:12

## 2024-05-22 RX ADMIN — NYSTATIN: 100000 POWDER TOPICAL at 08:14

## 2024-05-22 RX ADMIN — Medication 10 ML: at 05:18

## 2024-05-22 RX ADMIN — ACETAMINOPHEN 650 MG: 325 TABLET, FILM COATED ORAL at 05:36

## 2024-05-22 RX ADMIN — APIXABAN 5 MG: 5 TABLET, FILM COATED ORAL at 08:12

## 2024-05-22 RX ADMIN — DULOXETINE HYDROCHLORIDE 40 MG: 20 CAPSULE, DELAYED RELEASE ORAL at 08:11

## 2024-05-22 RX ADMIN — HYDROCODONE BITARTRATE AND ACETAMINOPHEN 1 TABLET: 5; 325 TABLET ORAL at 08:11

## 2024-05-22 NOTE — CASE MANAGEMENT/SOCIAL WORK
Continued Stay Note  [facility]     Patient Name: Jaja Carcamo  MRN: [id number]  Today's Date: 5/22/2024    Admit Date: 5/17/2024    Plan: D/C Plan: [facility] pending acceptance of ins.   Discharge Plan       Row Name 05/22/24 1626       Plan    Plan D/C Plan: [facility] pending acceptance of ins.      Row Name 05/22/24 1501       Plan    Plan D/C Plan: [facility] pending acceptance of ins.               Expected Discharge Date and Time       Expected Discharge Date Expected Discharge Time    May 22, 2024               Claire Castro RN

## 2024-05-22 NOTE — DISCHARGE SUMMARY
Date of Discharge:  5/22/2024    Discharge Diagnosis:     Acute UTI    COPD (chronic obstructive pulmonary disease)    Atrial fibrillation with rapid ventricular response    Volume overload    Fall    Hypothyroidism, unspecified    Hyperlipidemia    History of lobectomy of lung    History of kidney transplant    History of lung cancer    Nonobstructive atherosclerosis of coronary artery    History of DVT (deep vein thrombosis)    Pulmonary hypertension    Chronic hypoxemic respiratory failure    Essential (primary) hypertension    Paroxysmal atrial fibrillation    Valvular heart disease    Presenting Problem/History of Present Illness  Active Hospital Problems    Diagnosis  POA    **Acute UTI [N39.0]  Yes    Atrial fibrillation with rapid ventricular response [I48.91]  Yes    Volume overload [E87.70]  Yes    Fall [W19.XXXA]  Yes    Valvular heart disease [I38]  Yes    Chronic hypoxemic respiratory failure [J96.11]  Yes    History of lobectomy of lung [Z90.2]  Not Applicable    COPD (chronic obstructive pulmonary disease) [J44.9]  Yes    Essential (primary) hypertension [I10]  Yes    Hypothyroidism, unspecified [E03.9]  Yes    History of DVT (deep vein thrombosis) [Z86.718]  Not Applicable    Nonobstructive atherosclerosis of coronary artery [I25.10]  Yes    History of lung cancer [Z85.118]  Not Applicable    History of kidney transplant [Z94.0]  Not Applicable    Paroxysmal atrial fibrillation [I48.0]  Yes    Pulmonary hypertension [I27.20]  Yes    Hyperlipidemia [E78.5]  Yes      Resolved Hospital Problems   No resolved problems to display.          Hospital Course    Jaja Carcamo is a 79-year-old female who presented to [facility] ED on 5/17/2024 will discharge on 5/22/2024.  Patient was brought to emergency room by EMS from home with complaints of shortness of breath.  Reports she had a fall 2 days prior and remained on the ground overnight when her son found her.  She has had progressive worsening  shortness of breath.  She has COPD on chronic 4 L of oxygen.  Patient did note she had worsening swelling in her ankles.  Patient was found to be in A-fib with RVR was started on IV Cardizem.  Cardiology was consulted.  Patient will discharge on metoprolol and Cardizem for rate control.  She is unable to take Cardizem CD due to interaction with immunosuppressants..  She will be anticoagulated on Eliquis.  While in ED urinalysis showed urinary tract infection that grew E. coli producing ESBL.  She did receive short course of Merrem.  ID was consulted and gave her 1 dose of fosfomycin.  Patient has a significant past medical history of kidney transplant 2017 on antirejection medication.  She also has a history of lung cancer.  Nephrology followed closely with patient and assisted with diuresis.  Patient was treated aggressively and will discharge on oral Lasix 80 mg twice daily with 40 mill equivalent of potassium daily.  She will follow-up with her nephrologist.  Patient is hemodynamically stable at time of discharge.  PT does recommend SNF.  Patient refuses at this time.  We did discuss home health or outpatient physical therapy.  Patient refuses and states her son lives close and she has good resources at home.    Procedures Performed         Consults:   Consults       Date and Time Order Name Status Description    5/20/2024  2:37 PM Inpatient Infectious Diseases Consult Completed     5/20/2024  9:24 AM Inpatient Pulmonology Consult Completed     5/18/2024  8:57 AM Inpatient Cardiology Consult Completed     5/18/2024  4:07 AM Inpatient Nephrology Consult              Pertinent Test Results:    Lab Results (most recent)       Procedure Component Value Units Date/Time    POC Glucose Once [[id number]]  (Abnormal) Collected: 05/22/24 1204    Specimen: Blood Updated: 05/22/24 1206     Glucose 156 mg/dL      Comment: Serial Number: [id number]:  [id number]       Comprehensive Metabolic Panel [[id number]]  (Abnormal)  Collected: 05/22/24 0305    Specimen: Blood Updated: 05/22/24 0413     Glucose 101 mg/dL      BUN 16 mg/dL      Creatinine 0.76 mg/dL      Sodium 142 mmol/L      Potassium 3.6 mmol/L      Chloride 99 mmol/L      CO2 35.0 mmol/L      Calcium 9.7 mg/dL      Total Protein 6.3 g/dL      Albumin 3.8 g/dL      ALT (SGPT) 8 U/L      AST (SGOT) 13 U/L      Alkaline Phosphatase 58 U/L      Total Bilirubin 0.9 mg/dL      Globulin 2.5 gm/dL      A/G Ratio 1.5 g/dL      BUN/Creatinine Ratio 21.1     Anion Gap 8.0 mmol/L      eGFR 80.8 mL/min/1.73     Narrative:      GFR Normal >60  Chronic Kidney Disease <60  Kidney Failure <15    The GFR formula is only valid for adults with stable renal function between ages 18 and 70.    Magnesium [[id number]]  (Abnormal) Collected: 05/22/24 0305    Specimen: Blood Updated: 05/22/24 0413     Magnesium 1.5 mg/dL     CBC & Differential [[id number]]  (Abnormal) Collected: 05/22/24 0305    Specimen: Blood Updated: 05/22/24 0341    Narrative:      The following orders were created for panel order CBC & Differential.  Procedure                               Abnormality         Status                     ---------                               -----------         ------                     CBC Auto Differential[[id number]]        Abnormal            Final result                 Please view results for these tests on the individual orders.    CBC Auto Differential [[id number]]  (Abnormal) Collected: 05/22/24 0305    Specimen: Blood Updated: 05/22/24 0341     WBC 6.64 10*3/mm3      RBC 3.47 10*6/mm3      Hemoglobin 10.0 g/dL      Hematocrit 34.2 %      MCV 98.6 fL      MCH 28.8 pg      MCHC 29.2 g/dL      RDW 17.2 %      RDW-SD 62.0 fl      MPV 10.2 fL      Platelets 131 10*3/mm3      Neutrophil % 79.6 %      Lymphocyte % 8.0 %      Monocyte % 9.2 %      Eosinophil % 2.1 %      Basophil % 0.5 %      Immature Grans % 0.6 %      Neutrophils, Absolute 5.29 10*3/mm3      Lymphocytes, Absolute 0.53 10*3/mm3       Monocytes, Absolute 0.61 10*3/mm3      Eosinophils, Absolute 0.14 10*3/mm3      Basophils, Absolute 0.03 10*3/mm3      Immature Grans, Absolute 0.04 10*3/mm3      nRBC 0.0 /100 WBC     Blood Culture - Blood, Arm, Left [[id number]]  (Normal) Collected: 05/18/24 0057    Specimen: Blood from Arm, Left Updated: 05/22/24 0115     Blood Culture No growth at 4 days    Blood Culture - Blood, Arm, Left [[id number]]  (Normal) Collected: 05/18/24 0057    Specimen: Blood from Arm, Left Updated: 05/22/24 0115     Blood Culture No growth at 4 days    Potassium [[id number]]  (Normal) Collected: 05/21/24 1603    Specimen: Blood from Arm, Left Updated: 05/21/24 1923     Potassium 4.4 mmol/L      Comment: Slight hemolysis detected by analyzer. Result may be falsely elevated.       Comprehensive Metabolic Panel [[id number]]  (Abnormal) Collected: 05/21/24 0145    Specimen: Blood Updated: 05/21/24 0236     Glucose 111 mg/dL      BUN 16 mg/dL      Creatinine 0.88 mg/dL      Sodium 142 mmol/L      Potassium 3.4 mmol/L      Chloride 101 mmol/L      CO2 32.0 mmol/L      Calcium 9.6 mg/dL      Total Protein 6.3 g/dL      Albumin 3.9 g/dL      ALT (SGPT) 10 U/L      AST (SGOT) 18 U/L      Alkaline Phosphatase 56 U/L      Total Bilirubin 0.8 mg/dL      Globulin 2.4 gm/dL      A/G Ratio 1.6 g/dL      BUN/Creatinine Ratio 18.2     Anion Gap 9.0 mmol/L      eGFR 67.8 mL/min/1.73     Narrative:      GFR Normal >60  Chronic Kidney Disease <60  Kidney Failure <15    The GFR formula is only valid for adults with stable renal function between ages 18 and 70.    CBC & Differential [[id number]]  (Abnormal) Collected: 05/21/24 0145    Specimen: Blood Updated: 05/21/24 0216    Narrative:      The following orders were created for panel order CBC & Differential.  Procedure                               Abnormality         Status                     ---------                               -----------         ------                     CBC Auto  Differential[[id number]]        Abnormal            Final result                 Please view results for these tests on the individual orders.    CBC Auto Differential [[id number]]  (Abnormal) Collected: 05/21/24 0145    Specimen: Blood Updated: 05/21/24 0216     WBC 6.64 10*3/mm3      RBC 3.42 10*6/mm3      Hemoglobin 10.0 g/dL      Hematocrit 34.0 %      MCV 99.4 fL      MCH 29.2 pg      MCHC 29.4 g/dL      RDW 17.1 %      RDW-SD 61.8 fl      MPV 9.6 fL      Platelets 121 10*3/mm3      Neutrophil % 77.2 %      Lymphocyte % 8.6 %      Monocyte % 11.3 %      Eosinophil % 2.0 %      Basophil % 0.3 %      Immature Grans % 0.6 %      Neutrophils, Absolute 5.13 10*3/mm3      Lymphocytes, Absolute 0.57 10*3/mm3      Monocytes, Absolute 0.75 10*3/mm3      Eosinophils, Absolute 0.13 10*3/mm3      Basophils, Absolute 0.02 10*3/mm3      Immature Grans, Absolute 0.04 10*3/mm3      nRBC 0.0 /100 WBC     Urine Culture - Urine, Straight Cath [[id number]]  (Abnormal)  (Susceptibility) Collected: 05/17/24 2245    Specimen: Urine from Straight Cath Updated: 05/20/24 1047     Urine Culture >100,000 CFU/mL Escherichia coli ESBL     Comment:   Consider infectious disease consult.  Susceptibility results may not correlate to clinical outcomes.       Narrative:      Colonization of the urinary tract without infection is common. Treatment is discouraged unless the patient is symptomatic, pregnant, or undergoing an invasive urologic procedure.  Recent outcomes data supports the use of pip/tazo in the treatment of susceptible ESBL infections for uncomplicated UTI. Consider use of pip/tazo as a carbapenem-sparing regimen in applicable patients.    Susceptibility        Escherichia coli ESBL      ROBERT      Ertapenem Susceptible      Gentamicin Susceptible      Levofloxacin Resistant      Meropenem Susceptible      Nitrofurantoin Susceptible      Piperacillin + Tazobactam Susceptible      Trimethoprim + Sulfamethoxazole Resistant                            Iron Profile [[id number]]  (Abnormal) Collected: 05/18/24 0509    Specimen: Blood from Arm, Left Updated: 05/18/24 1232     Iron 31 mcg/dL      Iron Saturation (TSAT) 9 %      Transferrin 223 mg/dL      TIBC 332 mcg/dL     TSH [[id number]]  (Normal) Collected: 05/18/24 0057    Specimen: Blood from Arm, Left Updated: 05/18/24 0435     TSH 0.645 uIU/mL     High Sensitivity Troponin T 2Hr [[id number]]  (Abnormal) Collected: 05/18/24 0057    Specimen: Blood from Arm, Left Updated: 05/18/24 0125     HS Troponin T 30 ng/L      Troponin T Delta 1 ng/L     Narrative:      High Sensitive Troponin T Reference Range:  <14.0 ng/L- Negative Female for AMI  <22.0 ng/L- Negative Male for AMI  >=14 - Abnormal Female indicating possible myocardial injury.  >=22 - Abnormal Male indicating possible myocardial injury.   Clinicians would have to utilize clinical acumen, EKG, Troponin, and serial changes to determine if it is an Acute Myocardial Infarction or myocardial injury due to an underlying chronic condition.         POC Lactate [[id number]]  (Normal) Collected: 05/18/24 0050    Specimen: Blood Updated: 05/18/24 0053     Lactate 1.9 mmol/L      Comment: Serial Number: [id number]:  [id number]       BNP [[id number]]  (Abnormal) Collected: 05/17/24 2220    Specimen: Blood Updated: 05/17/24 2328     proBNP 8,040.0 pg/mL     Narrative:      This assay is used as an aid in the diagnosis of individuals suspected of having heart failure. It can be used as an aid in the diagnosis of acute decompensated heart failure (ADHF) in patients presenting with signs and symptoms of ADHF to the emergency department (ED). In addition, NT-proBNP of <300 pg/mL indicates ADHF is not likely.    Age Range Result Interpretation  NT-proBNP Concentration (pg/mL:      <50             Positive            >450                   Gray                 300-450                    Negative             <300    50-75           Positive            >900                   Acevedo                300-900                  Negative            <300      >75             Positive            >1800                  Gray                300-1800                  Negative            <300    Respiratory Panel PCR w/COVID-19(SARS-CoV-2) HODAN/MARIA M/TWILA/PAD/COR/JENISE In-House, NP Swab in UTM/VTM, 2 HR TAT - Swab, Nasopharynx [[id number]]  (Normal) Collected: 05/17/24 2216    Specimen: Swab from Nasopharynx Updated: 05/17/24 2312     ADENOVIRUS, PCR Not Detected     Coronavirus 229E Not Detected     Coronavirus HKU1 Not Detected     Coronavirus NL63 Not Detected     Coronavirus OC43 Not Detected     COVID19 Not Detected     Human Metapneumovirus Not Detected     Human Rhinovirus/Enterovirus Not Detected     Influenza A PCR Not Detected     Influenza B PCR Not Detected     Parainfluenza Virus 1 Not Detected     Parainfluenza Virus 2 Not Detected     Parainfluenza Virus 3 Not Detected     Parainfluenza Virus 4 Not Detected     RSV, PCR Not Detected     Bordetella pertussis pcr Not Detected     Bordetella parapertussis PCR Not Detected     Chlamydophila pneumoniae PCR Not Detected     Mycoplasma pneumo by PCR Not Detected    Narrative:      In the setting of a positive respiratory panel with a viral infection PLUS a negative procalcitonin without other underlying concern for bacterial infection, consider observing off antibiotics or discontinuation of antibiotics and continue supportive care. If the respiratory panel is positive for atypical bacterial infection (Bordetella pertussis, Chlamydophila pneumoniae, or Mycoplasma pneumoniae), consider antibiotic de-escalation to target atypical bacterial infection.    Blood Gas, Arterial - [[id number]]  (Abnormal) Collected: 05/17/24 2309    Specimen: Arterial Blood Updated: 05/17/24 2312     Site Left Radial     Orion's Test Positive     pH, Arterial 7.321 pH units      pCO2, Arterial 41.2 mm Hg      pO2, Arterial 105.4 mm Hg      HCO3, Arterial 21.3 mmol/L       Base Excess, Arterial -4.6 mmol/L      Comment: Serial Number: [id number]:  [id number]        O2 Saturation, Arterial 97.6 %      CO2 Content 22.5 mmol/L      Barometric Pressure for Blood Gas --     Comment: N/A        Modality Cannula     FIO2 44 %      Hemodilution No    Urinalysis, Microscopic Only - Straight Cath [[id number]]  (Abnormal) Collected: 05/17/24 2245    Specimen: Urine from Straight Cath Updated: 05/17/24 2303     RBC, UA Too Numerous to Count /HPF      WBC, UA Too Numerous to Count /HPF      Bacteria, UA 4+ /HPF      Squamous Epithelial Cells, UA 3-6 /HPF      Hyaline Casts, UA 0-2 /LPF      Granular Casts, UA 0-2 /LPF      Calcium Oxalate Crystals, UA Small/1+ /HPF      Methodology Manual Light Microscopy    Urinalysis With Microscopic If Indicated (No Culture) - Straight Cath [[id number]]  (Abnormal) Collected: 05/17/24 2245    Specimen: Urine from Straight Cath Updated: 05/17/24 2251     Color, UA Dark Yellow     Appearance, UA Cloudy     pH, UA <=5.0     Specific Gravity, UA 1.025     Glucose, UA Negative     Ketones, UA Trace     Bilirubin, UA Small (1+)     Comment: Confirmation testing is unavailable.  A serum bilirubin is recommended for further assessment.        Blood, UA Small (1+)     Protein,  mg/dL (2+)     Leuk Esterase, UA Small (1+)     Nitrite, UA Positive     Urobilinogen, UA 1.0 E.U./dL    High Sensitivity Troponin T [[id number]]  (Abnormal) Collected: 05/17/24 2220    Specimen: Blood Updated: 05/17/24 2250     HS Troponin T 29 ng/L     Narrative:      High Sensitive Troponin T Reference Range:  <14.0 ng/L- Negative Female for AMI  <22.0 ng/L- Negative Male for AMI  >=14 - Abnormal Female indicating possible myocardial injury.  >=22 - Abnormal Male indicating possible myocardial injury.   Clinicians would have to utilize clinical acumen, EKG, Troponin, and serial changes to determine if it is an Acute Myocardial Infarction or myocardial injury due to an underlying chronic  condition.         CK [[id number]]  (Abnormal) Collected: 05/17/24 2220    Specimen: Blood Updated: 05/17/24 2250     Creatine Kinase 208 U/L              Results for orders placed during the hospital encounter of 05/17/24    Adult Transthoracic Echo Complete W/ Cont if Necessary Per Protocol    Interpretation Summary    Left ventricular systolic function is normal. Calculated left ventricular EF = 55% Left ventricular ejection fraction appears to be 51 - 55%.    Left ventricular diastolic function was indeterminate.    Left atrial volume is moderately increased.    The right atrial cavity is dilated.    Severe tricuspid valve regurgitation is present.    Estimated right ventricular systolic pressure from tricuspid regurgitation is markedly elevated (>55 mmHg).    Mild to moderate mitral valve regurgitation is present            Condition on Discharge: Stable    Vital Signs  Temp:  [97.8 °F (36.6 °C)-98.8 °F (37.1 °C)] 97.9 °F (36.6 °C)  Heart Rate:  [69-86] 73  Resp:  [18-25] 18  BP: (109-158)/(52-88) 158/88      Physical Exam  Vitals reviewed.   HENT:      Head: Normocephalic.      Right Ear: External ear normal.      Left Ear: External ear normal.      Nose: Nose normal.      Mouth/Throat:      Mouth: Mucous membranes are moist.   Eyes:      General:         Right eye: No discharge.         Left eye: No discharge.   Cardiovascular:      Pulses: Normal pulses.   Pulmonary:      Effort: Pulmonary effort is normal.      Breath sounds: Normal breath sounds.   Abdominal:      General: Bowel sounds are normal.      Palpations: Abdomen is soft.   Musculoskeletal:         General: Normal range of motion.      Right lower leg: Edema present.      Left lower leg: Edema present.   Skin:     General: Skin is warm and dry.   Neurological:      Mental Status: She is alert and oriented to person, place, and time.   Psychiatric:         Behavior: Behavior normal.              Discharge Disposition  Home or Self Care    Discharge  Medications     Discharge Medications        New Medications        Instructions Start Date   dilTIAZem 90 MG tablet  Commonly known as: CARDIZEM   90 mg, Oral, Every 8 Hours Scheduled      ferrous sulfate 324 (65 Fe) MG tablet delayed-release EC tablet   324 mg, Oral, Daily With Breakfast   Start Date: May 23, 2024     furosemide 80 MG tablet  Commonly known as: LASIX   80 mg, Oral, 2 Times Daily             Changes to Medications        Instructions Start Date   potassium chloride 20 MEQ CR tablet  Commonly known as: KLOR-CON M20  What changed:   medication strength  how much to take   40 mEq, Oral, Daily             Continue These Medications        Instructions Start Date   albuterol sulfate  (90 Base) MCG/ACT inhaler  Commonly known as: PROVENTIL HFA;VENTOLIN HFA;PROAIR HFA   2 puffs, Inhalation, Every 6 Hours PRN      Aspercreme Arthritis Pain 1 % gel gel  Generic drug: Diclofenac Sodium   4 g, Topical, 2 Times Daily PRN      colestipol 1 g tablet  Commonly known as: COLESTID   1 g, Oral, Daily      diazePAM 5 MG tablet  Commonly known as: VALIUM   5 mg, Oral, 2 Times Daily PRN      DRY EYE RELIEF OP   2 drops, Ophthalmic, 2 Times Daily PRN      DULoxetine HCl 40 MG capsule delayed-release particles   40 mg, Oral, Daily      Eliquis 5 MG tablet tablet  Generic drug: apixaban   Take 1 tablet by mouth Every 12 (Twelve) Hours.      KERASAL EX   1 application , Topical, 2 Times Daily PRN      levothyroxine 50 MCG tablet  Commonly known as: SYNTHROID, LEVOTHROID   50 mcg, Oral, Daily      loperamide 2 MG capsule  Commonly known as: IMODIUM   2 mg, Oral, 4 Times Daily PRN      metoprolol succinate XL 50 MG 24 hr tablet  Commonly known as: TOPROL-XL   50 mg, Oral, Daily      Mucinex 600 MG 12 hr tablet  Generic drug: guaiFENesin   1,200 mg, Oral, 2 Times Daily      Neosporin + Pain Relief Max St 1 % ointment  Generic drug: Ofboe-Dqrwy-Bmpwonq-Pramoxine   1 application , Topical, 2 Times Daily PRN       predniSONE 5 MG tablet  Commonly known as: DELTASONE   5 mg, Oral, Daily      tacrolimus 1 MG capsule  Commonly known as: PROGRAF   1 mg, Oral, 2 Times Daily      Tylenol 325 MG tablet  Generic drug: acetaminophen   650 mg, Oral, Every 4 Hours PRN             Stop These Medications      simvastatin 20 MG tablet  Commonly known as: ZOCOR     Tolnaftate 1 % gel              Discharge Diet:   Diet Instructions       Diet: Cardiac Diets; Healthy Heart (2-3 Na+); Regular (IDDSI 7); Thin (IDDSI 0)      Discharge Diet: Cardiac Diets    Cardiac Diet: Healthy Heart (2-3 Na+)    Texture: Regular (IDDSI 7)    Fluid Consistency: Thin (IDDSI 0)            Activity at Discharge:   Activity Instructions       Activity as Tolerated              Follow-up Appointments  No future appointments.  Additional Instructions for the Follow-ups that You Need to Schedule       Discharge Follow-up with PCP   As directed       Currently Documented PCP:    Jonathan Luna APRN    PCP Phone Number:    [phone]     Follow Up Details: 1-2 weeks        Discharge Follow-up with Specified Provider: Cardiology; 1 Month   As directed      To: Cardiology   Follow Up: 1 Month        Discharge Follow-up with Specified Provider: Nephrology; 2 Weeks   As directed      To: Nephrology   Follow Up: 2 Weeks                Test Results Pending at Discharge  Pending Labs       Order Current Status    Blood Culture - Blood, Arm, Left Preliminary result    Blood Culture - Blood, Arm, Left Preliminary result             TONYA Mathis  05/22/24  13:50 EDT    Time: Discharge 29 min

## 2024-05-22 NOTE — PLAN OF CARE
Goal Outcome Evaluation:                                               Cpt Code: 69445 Cpt Code (47374, 92552 [place] [address]): 38020

## 2024-05-22 NOTE — PROGRESS NOTES
"PULMONARY CRITICAL CARE PROGRESS  NOTE      PATIENT IDENTIFICATION:  Name: Jaja Carcamo  MRN: [id number]  :  1945     Age: 79 y.o.  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      Objective:  tMax 24 hrs: Temp (24hrs), Av.1 °F (36.7 °C), Min:97.8 °F (36.6 °C), Max:98.8 °F (37.1 °C)      Vitals Ranges:   Temp:  [97.8 °F (36.6 °C)-98.8 °F (37.1 °C)] 97.9 °F (36.6 °C)  Heart Rate:  [69-86] 73  Resp:  [18-25] 18  BP: (109-158)/(52-88) 158/88    Intake and Output Last 3 Shifts:   I/O last 3 completed shifts:  In: 1300 [P.O.:1300]  Out: 3650 [Urine:3650]    Exam:  /88 (BP Location: Left arm, Patient Position: Lying)   Pulse 73   Temp 97.9 °F (36.6 °C) (Oral)   Resp 18   Ht 167.6 cm (66\")   Wt 89.3 kg (196 lb 13.9 oz)   SpO2 98%   BMI 31.78 kg/m²     General Appearance:     HEENT:  Normocephalic, without obvious abnormality. Conjunctivae/corneas clear.  Normal external ear canals. Nares normal, no drainage     Neck:  Supple, symmetrical, trachea midline. No JVD.  Lungs /Chest wall:   Bilateral basal rhonchi, respirations unlabored, symmetrical wall movement.     Heart:  Regular rate and rhythm, systolic murmur PMI left sternal border  Abdomen: Soft, nontender, no masses, no organomegaly.    Extremities: Trace edema, no clubbing or cyanosis        Medications:  apixaban, 5 mg, Oral, Q12H  dilTIAZem, 90 mg, Oral, Q8H  DULoxetine, 40 mg, Oral, Daily  ferrous sulfate, 324 mg, Oral, Daily With Breakfast  furosemide, 80 mg, Oral, BID  guaiFENesin, 1,200 mg, Oral, BID  levothyroxine, 50 mcg, Oral, Q AM  metoprolol succinate XL, 50 mg, Oral, Daily  nystatin, , Topical, Q12H  pantoprazole, 40 mg, Intravenous, Q AM  predniSONE, 5 mg, Oral, Daily  sodium chloride, 10 mL, " Intravenous, Q12H  tacrolimus, 1 mg, Oral, BID        Infusion:  Pharmacy to Dose meropenem (MERREM),          PRN:    acetaminophen    albuterol    senna-docusate sodium **AND** polyethylene glycol **AND** bisacodyl **AND** bisacodyl    Calcium Replacement - Follow Nurse / BPA Driven Protocol    carboxymethylcellulose sod    diazePAM    Diclofenac Sodium    HYDROcodone-acetaminophen    loperamide    Magnesium Standard Dose Replacement - Follow Nurse / BPA Driven Protocol    mineral oil-hydrophilic petrolatum    nitroglycerin    ondansetron    Pharmacy to Dose meropenem (MERREM)    Phosphorus Replacement - Follow Nurse / BPA Driven Protocol    Potassium Replacement - Follow Nurse / BPA Driven Protocol    [COMPLETED] Insert Peripheral IV **AND** sodium chloride    sodium chloride    sodium chloride  Data Review:  All labs (24hrs):   Recent Results (from the past 24 hour(s))   Potassium    Collection Time: 05/21/24  4:03 PM    Specimen: Arm, Left; Blood   Result Value Ref Range    Potassium 4.4 3.5 - 5.2 mmol/L   Comprehensive Metabolic Panel    Collection Time: 05/22/24  3:05 AM    Specimen: Blood   Result Value Ref Range    Glucose 101 (H) 65 - 99 mg/dL    BUN 16 8 - 23 mg/dL    Creatinine 0.76 0.57 - 1.00 mg/dL    Sodium 142 136 - 145 mmol/L    Potassium 3.6 3.5 - 5.2 mmol/L    Chloride 99 98 - 107 mmol/L    CO2 35.0 (H) 22.0 - 29.0 mmol/L    Calcium 9.7 8.6 - 10.5 mg/dL    Total Protein 6.3 6.0 - 8.5 g/dL    Albumin 3.8 3.5 - 5.2 g/dL    ALT (SGPT) 8 1 - 33 U/L    AST (SGOT) 13 1 - 32 U/L    Alkaline Phosphatase 58 39 - 117 U/L    Total Bilirubin 0.9 0.0 - 1.2 mg/dL    Globulin 2.5 gm/dL    A/G Ratio 1.5 g/dL    BUN/Creatinine Ratio 21.1 7.0 - 25.0    Anion Gap 8.0 5.0 - 15.0 mmol/L    eGFR 80.8 >60.0 mL/min/1.73   Magnesium    Collection Time: 05/22/24  3:05 AM    Specimen: Blood   Result Value Ref Range    Magnesium 1.5 (L) 1.6 - 2.4 mg/dL   CBC Auto Differential    Collection Time: 05/22/24  3:05 AM     Specimen: Blood   Result Value Ref Range    WBC 6.64 3.40 - 10.80 10*3/mm3    RBC 3.47 (L) 3.77 - 5.28 10*6/mm3    Hemoglobin 10.0 (L) 12.0 - 15.9 g/dL    Hematocrit 34.2 34.0 - 46.6 %    MCV 98.6 (H) 79.0 - 97.0 fL    MCH 28.8 26.6 - 33.0 pg    MCHC 29.2 (L) 31.5 - 35.7 g/dL    RDW 17.2 (H) 12.3 - 15.4 %    RDW-SD 62.0 (H) 37.0 - 54.0 fl    MPV 10.2 6.0 - 12.0 fL    Platelets 131 (L) 140 - 450 10*3/mm3    Neutrophil % 79.6 (H) 42.7 - 76.0 %    Lymphocyte % 8.0 (L) 19.6 - 45.3 %    Monocyte % 9.2 5.0 - 12.0 %    Eosinophil % 2.1 0.3 - 6.2 %    Basophil % 0.5 0.0 - 1.5 %    Immature Grans % 0.6 (H) 0.0 - 0.5 %    Neutrophils, Absolute 5.29 1.70 - 7.00 10*3/mm3    Lymphocytes, Absolute 0.53 (L) 0.70 - 3.10 10*3/mm3    Monocytes, Absolute 0.61 0.10 - 0.90 10*3/mm3    Eosinophils, Absolute 0.14 0.00 - 0.40 10*3/mm3    Basophils, Absolute 0.03 0.00 - 0.20 10*3/mm3    Immature Grans, Absolute 0.04 0.00 - 0.05 10*3/mm3    nRBC 0.0 0.0 - 0.2 /100 WBC        Imaging:  XR Chest 1 View  Narrative: XR CHEST 1 VW    Date of Exam: 5/22/2024 5:00 AM EDT    Indication: Shortness of breath    Comparison: CT chest from May 19, 2024    Findings:  There are coarse bilateral reticular markings. There is a small right pleural effusion. The heart is enlarged. The osseous structures appear intact.  Impression: Impression:  1.Coarse bilateral reticular markings, which could reflect interstitial pulmonary edema or atypical pneumonia.  2.Cardiomegaly with small right pleural effusion.    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    Barry Mckinley MD   5/22/2024  12:01 EDT

## 2024-05-22 NOTE — PROGRESS NOTES
Infectious Diseases Progress Note      LOS: 4 days   Patient Care Team:  Jonathan Luna APRN as PCP - General (Family Medicine)  Jak Gavin MD as Cardiologist (Cardiology)    Chief Complaint: Shortness of breath, fall before admission, dysuria at    Subjective   the patient has been afebrile for the last 24 hours.  The patient is on 3 L of oxygen by nasal cannula hemodynamically stable, and is tolerating antimicrobial therapy.  Overall patient is feeling better and denies any current urinary symptoms          Review of Systems:   Review of Systems   Constitutional: Negative.    HENT: Negative.     Eyes: Negative.    Respiratory: Negative.  Positive for shortness of breath.    Cardiovascular: Negative.    Gastrointestinal: Negative.    Endocrine: Negative.    Genitourinary: Negative.    Musculoskeletal: Negative.    Skin: Negative.    Neurological: Negative.  Positive for weakness.   Psychiatric/Behavioral: Negative.     All other systems reviewed and are negative.       Objective     Vital Signs  Temp:  [97.8 °F (36.6 °C)-98.8 °F (37.1 °C)] 97.9 °F (36.6 °C)  Heart Rate:  [69-86] 73  Resp:  [18-25] 18  BP: (109-158)/(52-88) 158/88    Physical Exam:  Physical Exam  Vitals and nursing note reviewed.   Constitutional:       General: She is not in acute distress.     Appearance: She is well-developed and normal weight. She is ill-appearing. She is not diaphoretic.   HENT:      Head: Normocephalic and atraumatic.   Eyes:      General: No scleral icterus.     Extraocular Movements: Extraocular movements intact.      Conjunctiva/sclera: Conjunctivae normal.      Pupils: Pupils are equal, round, and reactive to light.   Cardiovascular:      Rate and Rhythm: Normal rate and regular rhythm.      Heart sounds: Normal heart sounds, S1 normal and S2 normal. No murmur heard.  Pulmonary:      Effort: Pulmonary effort is normal. No respiratory distress.      Breath sounds: No stridor. Wheezing present. No rales.   Chest:       Chest wall: No tenderness.   Abdominal:      General: Bowel sounds are normal. There is no distension.      Palpations: Abdomen is soft. There is no mass.      Tenderness: There is no abdominal tenderness. There is no guarding.   Musculoskeletal:         General: No swelling, tenderness or deformity. Normal range of motion.      Cervical back: Neck supple.   Skin:     General: Skin is warm and dry.      Coloration: Skin is not pale.      Findings: Bruising present. No erythema or rash.      Comments: Small wound to the left heel that does not appear to be actively infected   Neurological:      General: No focal deficit present.      Mental Status: She is alert and oriented to person, place, and time. Mental status is at baseline.      Cranial Nerves: No cranial nerve deficit.   Psychiatric:         Mood and Affect: Mood normal.          Results Review:    I have reviewed all clinical data, test, lab, and imaging results.     Radiology  XR Chest 1 View    Result Date: 5/22/2024  XR CHEST 1 VW Date of Exam: 5/22/2024 5:00 AM EDT Indication: Shortness of breath Comparison: CT chest from May 19, 2024 Findings: There are coarse bilateral reticular markings. There is a small right pleural effusion. The heart is enlarged. The osseous structures appear intact.     Impression: 1.Coarse bilateral reticular markings, which could reflect interstitial pulmonary edema or atypical pneumonia. 2.Cardiomegaly with small right pleural effusion. Electronically Signed: Naun Bills MD  5/22/2024 8:10 AM EDT  Workstation ID: [id number]    CT Abdomen Pelvis Stone Protocol    Result Date: 5/21/2024  CT ABDOMEN PELVIS STONE PROTOCOL Date of Exam: 5/21/2024 3:30 PM EDT Indication: rule out obstructive uropathy. Comparison: CT abdomen and pelvis without contrast 11/8/2011 Technique: Axial CT images were obtained of the abdomen and pelvis without the administration of contrast. Sagittal and coronal reconstructions were performed.  Automated  exposure control and iterative reconstruction methods were used. Findings: Lower chest demonstrates cardiomegaly and coronary atherosclerotic calcifications. Negative for pericardial effusion. Small bilateral pleural effusions. Mild smooth interlobular septal thickening at lung bases compatible with mild pulmonary edema. Mild bibasilar atelectasis. Lack of contrast limits assessment of abdominal organs and vasculature. Liver and spleen are normal in size and contour. Cholelithiasis. No pericholecystic inflammation. Normal right adrenal gland. Left adrenal gland nodule stable from 2011 consistent with benign etiology likely adenoma measuring 2.1 cm. Pancreas without evidence of pancreatitis. Negative kidneys are severely atrophic. Nonobstructing bilateral nephrolithiasis with extensive renal arterial calcifications bilaterally. Low-density right renal lesion consistent with cyst measuring 16 mm. Negative for hydronephrosis in the native kidney or obstructive uropathy. Transplant kidney in the right lower quadrant. Negative for hydronephrosis within the transplant. No ureteral calculus in the transplant kidney which inserts at the bladder dome anteriorly. The urinary bladder is unremarkable. There is extensive streak artifact from bilateral hip prosthesis which limits bladder assessment. Uterus absent. Negative for adnexal mass. Negative for pneumoperitoneum. No bowel obstruction. Colonic diverticulosis without diverticulitis. Anasarca. No findings of appendicitis. Extensive vascular calcifications of the abdominal aorta and branch vasculature. Severe short segment stenosis suspected in the right common iliac artery due to atherosclerotic calcification (2/75). Postsurgical changes of unilateral right-sided lumbar fixation noted with hardware at the L4-S1 levels. Chronic central compression fractures at T12 and L1 not significantly changed. Bilateral hip prosthesis in place. Remote healed fracture at the left inferior  pubic ramus. Deformity of the left medial iliac bone related to site of bone graft harvesting. Subacute fracture at the right lateral 10th rib.     Impression: 1. No acute abnormality in the abdomen or pelvis. 2. Transplant kidney in the right lower quadrant without hydronephrosis or obstructive uropathy. Native kidneys are severely atrophic with bilateral nonobstructing nephrolithiasis. 3. Cholelithiasis. 4. Cardiomegaly with basilar interstitial pulmonary edema and small bilateral pleural effusions. 5. Additional incidental findings above. Electronically Signed: Franck Cortez MD  5/21/2024 3:47 PM EDT  Workstation ID: [id number]     Cardiology    Laboratory    Results from last 7 days   Lab Units 05/22/24  0305 05/21/24  0145 05/20/24  0337 05/19/24  0521 05/18/24  0509 05/17/24  2220   WBC 10*3/mm3 6.64 6.64 6.18 6.70 9.01 9.75   HEMOGLOBIN g/dL 10.0* 10.0* 9.2* 9.9* 10.4* 11.4*   HEMATOCRIT % 34.2 34.0 31.4* 33.6* 35.3 38.8   PLATELETS 10*3/mm3 131* 121* 120* 128* 116* 125*     Results from last 7 days   Lab Units 05/22/24  0305 05/21/24  1603 05/21/24  0145 05/20/24  0337 05/19/24  0521 05/18/24  0509 05/17/24  2220   SODIUM mmol/L 142  --  142 142 140 141 141   POTASSIUM mmol/L 3.6 4.4 3.4* 3.8 4.0 5.1 6.0*   CHLORIDE mmol/L 99  --  101 104 104 105 104   CO2 mmol/L 35.0*  --  32.0* 28.8 29.0 25.0 21.0*   BUN mg/dL 16  --  16 15 20 22 22   CREATININE mg/dL 0.76  --  0.88 0.74 0.75 0.86 0.97   GLUCOSE mg/dL 101*  --  111* 113* 117* 116* 145*   ALBUMIN g/dL 3.8  --  3.9 3.3* 3.5 3.9 4.0   BILIRUBIN mg/dL 0.9  --  0.8 0.6 0.9 1.4* 2.5*   ALK PHOS U/L 58  --  56 52 51 58 65   AST (SGOT) U/L 13  --  18 16 16 22 24   ALT (SGPT) U/L 8  --  10 9 9 10 12   CALCIUM mg/dL 9.7  --  9.6 9.1 9.3 9.8 10.1     Results from last 7 days   Lab Units 05/17/24  2220   CK TOTAL U/L 208*             Microbiology   Microbiology Results (last 10 days)       Procedure Component Value - Date/Time    Blood Culture - Blood, Arm, Left  [[id number]]  (Normal) Collected: 05/18/24 0057    Lab Status: Preliminary result Specimen: Blood from Arm, Left Updated: 05/22/24 0115     Blood Culture No growth at 4 days    Blood Culture - Blood, Arm, Left [[id number]]  (Normal) Collected: 05/18/24 0057    Lab Status: Preliminary result Specimen: Blood from Arm, Left Updated: 05/22/24 0115     Blood Culture No growth at 4 days    Urine Culture - Urine, Straight Cath [[id number]]  (Abnormal)  (Susceptibility) Collected: 05/17/24 2245    Lab Status: Final result Specimen: Urine from Straight Cath Updated: 05/20/24 1047     Urine Culture >100,000 CFU/mL Escherichia coli ESBL     Comment:   Consider infectious disease consult.  Susceptibility results may not correlate to clinical outcomes.       Narrative:      Colonization of the urinary tract without infection is common. Treatment is discouraged unless the patient is symptomatic, pregnant, or undergoing an invasive urologic procedure.  Recent outcomes data supports the use of pip/tazo in the treatment of susceptible ESBL infections for uncomplicated UTI. Consider use of pip/tazo as a carbapenem-sparing regimen in applicable patients.    Susceptibility        Escherichia coli ESBL      ROBERT      Ertapenem Susceptible      Gentamicin Susceptible      Levofloxacin Resistant      Meropenem Susceptible      Nitrofurantoin Susceptible      Piperacillin + Tazobactam Susceptible      Trimethoprim + Sulfamethoxazole Resistant                           Respiratory Panel PCR w/COVID-19(SARS-CoV-2) HOADN/MARIA M/TWILA/PAD/COR/JENISE In-House, NP Swab in UTM/VTM, 2 HR TAT - Swab, Nasopharynx [[id number]]  (Normal) Collected: 05/17/24 2216    Lab Status: Final result Specimen: Swab from Nasopharynx Updated: 05/17/24 2312     ADENOVIRUS, PCR Not Detected     Coronavirus 229E Not Detected     Coronavirus HKU1 Not Detected     Coronavirus NL63 Not Detected     Coronavirus OC43 Not Detected     COVID19 Not Detected     Human Metapneumovirus Not  Detected     Human Rhinovirus/Enterovirus Not Detected     Influenza A PCR Not Detected     Influenza B PCR Not Detected     Parainfluenza Virus 1 Not Detected     Parainfluenza Virus 2 Not Detected     Parainfluenza Virus 3 Not Detected     Parainfluenza Virus 4 Not Detected     RSV, PCR Not Detected     Bordetella pertussis pcr Not Detected     Bordetella parapertussis PCR Not Detected     Chlamydophila pneumoniae PCR Not Detected     Mycoplasma pneumo by PCR Not Detected    Narrative:      In the setting of a positive respiratory panel with a viral infection PLUS a negative procalcitonin without other underlying concern for bacterial infection, consider observing off antibiotics or discontinuation of antibiotics and continue supportive care. If the respiratory panel is positive for atypical bacterial infection (Bordetella pertussis, Chlamydophila pneumoniae, or Mycoplasma pneumoniae), consider antibiotic de-escalation to target atypical bacterial infection.            Medication Review:       Schedule Meds  apixaban, 5 mg, Oral, Q12H  dilTIAZem, 90 mg, Oral, Q8H  DULoxetine, 40 mg, Oral, Daily  ferrous sulfate, 324 mg, Oral, Daily With Breakfast  furosemide, 80 mg, Oral, BID  guaiFENesin, 1,200 mg, Oral, BID  levothyroxine, 50 mcg, Oral, Q AM  metoprolol succinate XL, 50 mg, Oral, Daily  nystatin, , Topical, Q12H  pantoprazole, 40 mg, Intravenous, Q AM  predniSONE, 5 mg, Oral, Daily  sodium chloride, 10 mL, Intravenous, Q12H  tacrolimus, 1 mg, Oral, BID        Infusion Meds  Pharmacy to Dose meropenem (MERREM),         PRN Meds    acetaminophen    albuterol    senna-docusate sodium **AND** polyethylene glycol **AND** bisacodyl **AND** bisacodyl    Calcium Replacement - Follow Nurse / BPA Driven Protocol    carboxymethylcellulose sod    diazePAM    Diclofenac Sodium    HYDROcodone-acetaminophen    loperamide    Magnesium Standard Dose Replacement - Follow Nurse / BPA Driven Protocol    mineral oil-hydrophilic  petrolatum    nitroglycerin    ondansetron    Pharmacy to Dose meropenem (MERREM)    Phosphorus Replacement - Follow Nurse / BPA Driven Protocol    Potassium Replacement - Follow Nurse / BPA Driven Protocol    [COMPLETED] Insert Peripheral IV **AND** sodium chloride    sodium chloride    sodium chloride        Assessment & Plan       Antimicrobial Therapy   1.         2.        3.        4.        5.            Assessment     ESBL  E. coli UTI.  Patient has had multiple UTIs over the last year.    Blood cultures have been negative so far.  CT ordered to rule out obstructive uropathy with no acute findings.  Patient denies any urinary symptoms today     Of COPD chronically on 4 L oxygen at home.  Currently on 3 liters     History of kidney transplant 2017 due to granulomatosis polyangiitis.  Currently on prednisone and Prograf     History of lung cancer     History of DVT     Plan     Patient received 1 dose of p.o. fosfomycin 3 g on 5/21/2024    Monitor off antimicrobial therapy  Continue supportive care  Case discussed with primary NP at bedside  Okay to discharge from Infectious Disease standpoint  Not much more to add from infectious disease standpoint-we will sign off at this time-please call with any questions.      Erlinda Mccabe, APRN  05/22/24  14:46 EDT    Note is dictated utilizing voice recognition software/Dragon   I have reviewed and confirmed nurses' notes for patient's medications, allergies, medical history, and surgical history.

## 2024-05-22 NOTE — NURSING NOTE
WOCN note:    79 yr old female admitted 5/17/24 after a fall at home and complaints of shortness of breath. She has a hx of COPD, lung cancer with lobectomy, HTN and renal transplant. WOCN re-consult for a skin tear.     Patient presents with a skin tear to her upper thoracic spine from a fall at home. The wound measures approximately 2x2cm and is mostly partial thickness and appears to be healing. There is a small amount of serous exudate noted on the old dressing. The wound was cleansed with NS and the silicone foam dressing was replaced. Recommend to continue with current plan of care.

## 2024-05-22 NOTE — PROGRESS NOTES
RENAL/[facility]:    Name: Jaja Carcamo  Age: 79 y.o.  : 1945  Sex: female    26    Subjective  Patient admitted with shortness of breath     Interval History:   Good UOP   Edema slowly better    Objective:    Vital Signs  Temp:  [97.8 °F (36.6 °C)-98.8 °F (37.1 °C)] 97.9 °F (36.6 °C)  Heart Rate:  [69-86] 73  Resp:  [18-25] 18  BP: (109-158)/(52-88) 158/88        Intake/Output Summary (Last 24 hours) at 2024 1254  Last data filed at 2024 0900  Gross per 24 hour   Intake 840 ml   Output 2100 ml   Net -1260 ml           Physical Exam  GEN: Awake, NAD  ENT: PERRL, EOMI, MMM  NECK: Supple, no JVD  CHEST: CTAB, no W/R/C  CV: RRR, no M/G/R. +edema  ABD: Soft, NT, +BS  SKIN: Warm and Dry  NEURO: CN's intact       Results Review:      Results from last 7 days   Lab Units 24  0305 24  0145 24  0337 24  0521 24  0509   SODIUM mmol/L 142 142 142 140 141   CO2 mmol/L 35.0* 32.0* 28.8 29.0 25.0   BUN mg/dL 16 16 15 20 22   CREATININE mg/dL 0.76 0.88 0.74 0.75 0.86   CALCIUM mg/dL 9.7 9.6 9.1 9.3 9.8   ALBUMIN g/dL 3.8 3.9 3.3* 3.5 3.9   AST (SGOT) U/L 13 18 16 16 22   ALT (SGPT) U/L 8 10 9 9 10   EGFR mL/min/1.73 80.8 67.8 83.4 82.1 69.7       Results from last 7 days   Lab Units 24  0305 24  0145 24  0337 24  0521 24  0509 24  2220   WBC 10*3/mm3 6.64 6.64 6.18 6.70 9.01 9.75       Imaging studies: I personally reviewed the patient's most recent pertinent imaging studies       Medication Review:   apixaban, 5 mg, Oral, Q12H  dilTIAZem, 90 mg, Oral, Q8H  DULoxetine, 40 mg, Oral, Daily  ferrous sulfate, 324 mg, Oral, Daily With Breakfast  furosemide, 80 mg, Oral, BID  guaiFENesin, 1,200 mg, Oral, BID  levothyroxine, 50 mcg, Oral, Q AM  metoprolol succinate XL, 50 mg, Oral, Daily  nystatin, , Topical, Q12H  pantoprazole, 40 mg, Intravenous, Q AM  predniSONE, 5 mg, Oral, Daily  sodium chloride, 10 mL, Intravenous, Q12H  tacrolimus, 1 mg, Oral,  BID          Assessment  ESRD - s/p DDKT    Chronic immunoRx     Fluid overload     Atrial fibrillation    History of COPD     History of lung cancer     Hypokalemia    Plan:  Cr stable at 0.8   K being replaced  Continue Lasix of 80 mg PO BID  On fluid restriction  Will follow      Naun Falcon MD  [facility]  05/22/24  12:54 EDT  Tel: [phone]  Fax: [phone]

## 2024-05-22 NOTE — NURSING NOTE
Discharge instruction and education done with patient and patient's son Aris. Pt is forgetful and is unable to remember discharge instructions very well. I spoke with son to make sure that he was aware that physical therapy recommended rehab. He stated that he was aware but did not want to make her go to a facility but that he had spoken with the  to set up home health.

## 2024-05-22 NOTE — CASE MANAGEMENT/SOCIAL WORK
Continued Stay Note  DAHIANA Gay     Patient Name: Jaja Carcamo  MRN: [id number]  Today's Date: 5/22/2024    Admit Date: 5/17/2024    Plan: D/C Plan: [facility] pending acceptance of ins.   Discharge Plan       Row Name 05/22/24 1501       Plan    Plan D/C Plan: [facility] pending acceptance of ins.               Expected Discharge Date and Time       Expected Discharge Date Expected Discharge Time    May 22, 2024               Claire Castro RN

## 2024-05-22 NOTE — THERAPY TREATMENT NOTE
"Subjective: Pt agreeable to therapeutic plan of care.    Objective:     Bed mobility - N/A or Not attempted.  Transfers - CGA with verbal cues  Ambulation - 45 feet CGA and with rolling walker plus assist of one to manage equipment    Therapeutic Exercise - 10 Reps B LE AROM supported sitting / chair    Vitals: WNL    Pain: 3 VAS   Location: legs  Intervention for pain: Repositioned and Increased Activity    Education: Provided education on the importance of mobility in the acute care setting, Verbal/Tactile Cues, Transfer Training, Gait Training, and Energy conservation strategies    Assessment: Jaja Carcamo presents with functional mobility impairments which indicate the need for skilled intervention. Pt doing better today. [place] [facility] [place]. Pt needs 100% cueing for initiation and shows poor awareness of her deficits. PT educated pt that if she does DC to home that she needs to use her rolling walker at all times. Tolerating session today without incident. Will continue to follow and progress as tolerated.     Plan/Recommendations:   If medically appropriate, Moderate Intensity Therapy recommended post-acute care. This is recommended as therapy feels the patient would require 3-4 days per week and wouldn't tolerate \"3 hour daily\" rehab intensity. SNF would be the preferred choice. If the patient does not agree to SNF, arrange HH or OP depending on home bound status. If patient is medically complex, consider LTACH. Pt requires no DME at discharge.     Pt desires Home at discharge. Pt cooperative; agreeable to therapeutic recommendations and plan of care.         Basic Mobility 6-click:  Rollin = Total, A lot = 2, A little = 3; 4 = None  Supine>Sit:   1 = Total, A lot = 2, A little = 3; 4 = None   Sit>Stand with arms:  1 = Total, A lot = 2, A little = 3; 4 = None  Bed>Chair:   1 = Total, A lot = 2, A little = 3; 4 = None  Ambulate in room:  1 = Total, A lot = 2, A " little = 3; 4 = None  3-5 Steps with railin = Total, A lot = 2, A little = 3; 4 = None  Score: 17    Modified [place]: N/A = No pre-op stroke/TIA    Post-Tx Position: Up in Chair, Alarms activated, and Call light and personal items within reach  PPE: gloves

## 2024-05-22 NOTE — OUTREACH NOTE
Prep Survey      Flowsheet Row Responses   [facility]? [facility]   Is LACE score < 7 ? No   Eligibility Readm Mgmt   Discharge diagnosis Acute UTI, n A-fib with RVR   Does the patient have one of the following disease processes/diagnoses(primary or secondary)? Other   Does the patient have Home health ordered? Yes   What is the Home health agency?  [facility]   Is there a DME ordered? No   Prep survey completed? Yes            Mildred JONES - Registered Nurse

## 2024-05-22 NOTE — PLAN OF CARE
Goal Outcome Evaluation:  Plan of Care Reviewed With: patient           Outcome Evaluation: pt on 3L O2, c/o ab pain gave prn med, rested off and on during the night, switched in am to PO lasix, pt needs some sort of rehab, pt states lives alone but is very unsteady on her feet to be home alone    K+ low replacing, magnesium low replacing

## 2024-05-22 NOTE — PROGRESS NOTES
Referring Provider: Nilsa Lomeli MD    Reason for follow-up: Atrial fibrillation, HFpEF     Patient Care Team:  Jonathan Luna APRN as PCP - General (Family Medicine)  Jak Gavin MD as Cardiologist (Cardiology)      SUBJECTIVE  Laying comfortably in chair.  Denies any chest pain or shortness of breath.  Receiving antibiotics.     ROS  Review of all systems negative except as indicated.    Since I have last seen, the patient has been without any chest discomfort, shortness of breath, palpitations, dizziness or syncope.  Denies having any headache, abdominal pain, nausea, vomiting, diarrhea, constipation, loss of weight or loss of appetite.  Denies having any excessive bruising, hematuria or blood in the stool.  ROS      Personal History:    Past Medical History:   Diagnosis Date    Allergic rhinitis 04/14/2015    Asthma 08/10/2017    Atrial flutter 05/11/2017    Chronic diarrhea 04/15/2019    Chronic hypoxemic respiratory failure 01/18/2024    Chronic pain 02/03/2015    COPD (chronic obstructive pulmonary disease)     COVID-19 virus detected 08/11/2022    Cytokine release syndrome, grade 1 08/16/2022    Edema of both lower extremities due to peripheral venous insufficiency 03/12/2021    ESRD on hemodialysis 05/22/2013    Essential (primary) hypertension 03/03/2022    Fracture of ulnar styloid 05/19/2022    Fracture of unspecified carpal bone, right wrist, subsequent encounter for fracture with routine healing 03/03/2022    Gastroesophageal reflux disease 11/12/2020    Gout 08/10/2017    Hearing loss 08/10/2017    History of appendectomy     History of DVT (deep vein thrombosis) 11/12/2020    History of kidney transplant 06/30/2017    History of lobectomy of lung 01/18/2024 03/08/2016:  RIGHT Lower Lobe Mass--> Right Video-assisted thoracoscopy with a moderate-to-large wedge resection of the RLL (by Dr. Haris Mcconnell @ [facility])--> Poorly differentiated carcinoma of the RLL.      History of repair  of hip joint 05/21/2013    History of suicide attempt 05/20/2024    Hyperlipidemia 08/11/2014    Hypothyroidism, unspecified 03/03/2022    Infection due to extended spectrum beta lactamase (ESBL) producing bacteria 03/11/2016    No A2K system hx. +ESBL E coli urine on 3/11/16.      Irritable bowel syndrome 04/15/2019    Long term (current) use of immunosuppressive biologic 04/28/2021    Long term current use of anticoagulant therapy 01/18/2024    Malignant neoplasm of lung 08/10/2017    Menopausal flushing 08/30/2021    Mitral valve regurgitation 07/06/2015    Mixed anxiety and depressive disorder 04/30/2015    Non-small cell lung cancer 08/10/2017    Nonobstructive atherosclerosis of coronary artery 06/02/2019 [facility]12[facility]2022[facility] with Covid-19: LM:-nl;  LAD: diffuse, Ca++; 30%; CIRC: Dominant. Normal; RCA: small; 50% diffuse, proximal and mid-segment.      NSTEMI (non-ST elevated myocardial infarction) 08/11/2022    Obsessive-compulsive disorder 04/15/2019    Osteoarthritis 05/20/2024    Paroxysmal atrial fibrillation 05/11/2017    Paroxysmal supraventricular tachycardia 05/11/2017    Peripheral neuropathy 08/11/2014    Personal history of peptic ulcer disease 11/12/2020    Postoperative anemia due to acute blood loss 05/22/2013    Right wrist fracture 03/01/2022    Seasonal allergies 08/10/2017    Secondary hyperparathyroidism of renal origin 09/14/2015    Tricuspid valve regurgitation 03/15/2017    Ulcer of lower extremity 08/30/2021    Unspecified acute appendicitis 03/03/2022    Valvular heart disease 05/20/2024    Wegener's granulomatosis with renal involvement 03/03/2022       Past Surgical History:   Procedure Laterality Date    APPENDECTOMY      BREAST SURGERY Left     cysts rmeoved    CARDIAC CATHETERIZATION Right 08/12/2022    Procedure: Left Heart Cath and coronary angiogram;  Surgeon: Jak Gavin MD;  Location: [facility] INVASIVE LOCATION;  Service: Cardiology;   Laterality: Right;    CLOSED REDUCTION WRIST FRACTURE Right 03/01/2022    Procedure: WRIST CLOSED REDUCTION;  Surgeon: Gaudencio Townsend MD;  Location: [facility] MAIN OR;  Service: Orthopedics;  Laterality: Right;    CYSTOSCOPY      HIP ARTHROPLASTY      HYSTERECTOMY      LUNG LOBECTOMY Right     TRANSPLANTATION RENAL         Family History   Problem Relation Age of Onset    No Known Problems Mother     No Known Problems Father        Social History     Tobacco Use    Smoking status: Former    Smokeless tobacco: Never   Vaping Use    Vaping status: Former   Substance Use Topics    Alcohol use: Never    Drug use: Never        Home meds:  Prior to Admission medications    Medication Sig Start Date End Date Taking? Authorizing Provider   acetaminophen (Tylenol) 325 MG tablet Take 2 tablets by mouth Every 4 (Four) Hours As Needed for Fever or Mild Pain. 3/13/20  Yes Court Vences MD   apixaban (ELIQUIS) 5 MG tablet tablet Take 1 tablet by mouth Every 12 (Twelve) Hours. 8/16/22  Yes Clyde White, DO   albuterol sulfate  (90 Base) MCG/ACT inhaler Inhale 2 puffs Every 6 (Six) Hours As Needed for Wheezing.    Court Vences MD   Carboxymethylcellulose Sodium (DRY EYE RELIEF OP) Apply 2 drops to eye(s) as directed by provider 2 (Two) Times a Day As Needed (dry eyes). 5/11/22   Court Vences MD   colestipol (COLESTID) 1 g tablet Take 1 tablet by mouth Daily.    Court Vences MD   diazePAM (VALIUM) 5 MG tablet Take 5 mg by mouth 2 (Two) Times a Day As Needed for Anxiety.    Court Vences MD   Diclofenac Sodium (Aspercreme Arthritis Pain) 1 % gel gel Apply 4 g topically to the appropriate area as directed 2 (Two) Times a Day As Needed (pain). 10/21/20   Court Vences MD   DULoxetine HCl 40 MG capsule delayed-release particles Take 1 capsule by mouth Daily.    Court Vences MD   furosemide (LASIX) 40 MG tablet Take 40 mg by mouth 2 (Two) Times a Day.    Ru  MD Court   guaiFENesin (Mucinex) 600 MG 12 hr tablet Take 1,200 mg by mouth 2 (Two) Times a Day. 8/16/22   Court Vences MD   levothyroxine (SYNTHROID, LEVOTHROID) 50 MCG tablet Take 1 tablet by mouth See Admin Instructions.    Court Vences MD   loperamide (IMODIUM) 2 MG capsule Take 2 mg by mouth 4 (Four) Times a Day As Needed for Diarrhea. 3/13/20   Court Vences MD   metoprolol succinate XL (TOPROL-XL) 50 MG 24 hr tablet Take 50 mg by mouth Daily.    Court Vences MD   Ddcmo-Xxoyt-Mktuvpi-Pramoxine (Neosporin + Pain Relief Max St) 1 % ointment Apply 1 application topically to the appropriate area as directed 2 (Two) Times a Day As Needed (sores). 7/2/20   Court Vences MD   O2 (OXYGEN) Inhale 2 L/min Continuous. 7/2/20   Court Vences MD   potassium chloride (K-DUR,KLOR-CON) 20 MEQ CR tablet Take 1 tablet by mouth Daily. 5/31/21   Court Vences MD   predniSONE (DELTASONE) 5 MG tablet Take 5 mg by mouth Daily.    Court Vences MD   Salicylic Acid-Urea (KERASAL EX) Apply 1 application topically to the appropriate area as directed 2 (Two) Times a Day As Needed (dry feet). 11/23/19   Court Vences MD   simvastatin (ZOCOR) 20 MG tablet Take 20 mg by mouth Every Night.    Court Vences MD   tacrolimus (PROGRAF) 1 MG capsule Take 1 mg by mouth 2 (Two) Times a Day.    Court Vences MD   Tolnaftate 1 % gel Apply 1 application topically to the appropriate area as directed 2 (Two) Times a Day As Needed (ingrown toenail). 11/30/20   Court Vences MD       Allergies:  Zolpidem    Scheduled Meds:apixaban, 5 mg, Oral, Q12H  dilTIAZem, 90 mg, Oral, Q8H  DULoxetine, 40 mg, Oral, Daily  ferrous sulfate, 324 mg, Oral, Daily With Breakfast  furosemide, 80 mg, Oral, BID  guaiFENesin, 1,200 mg, Oral, BID  levothyroxine, 50 mcg, Oral, Q AM  magnesium sulfate, 2 g, Intravenous, Q2H  metoprolol succinate XL, 50 mg, Oral,  "Daily  nystatin, , Topical, Q12H  pantoprazole, 40 mg, Intravenous, Q AM  potassium chloride ER, 40 mEq, Oral, Q4H  predniSONE, 5 mg, Oral, Daily  sodium chloride, 10 mL, Intravenous, Q12H  tacrolimus, 1 mg, Oral, BID      Continuous Infusions:Pharmacy to Dose meropenem (MERREM),       PRN Meds:.  acetaminophen    albuterol    senna-docusate sodium **AND** polyethylene glycol **AND** bisacodyl **AND** bisacodyl    Calcium Replacement - Follow Nurse / BPA Driven Protocol    carboxymethylcellulose sod    diazePAM    Diclofenac Sodium    HYDROcodone-acetaminophen    loperamide    Magnesium Standard Dose Replacement - Follow Nurse / BPA Driven Protocol    mineral oil-hydrophilic petrolatum    nitroglycerin    ondansetron    Pharmacy to Dose meropenem (MERREM)    Phosphorus Replacement - Follow Nurse / BPA Driven Protocol    Potassium Replacement - Follow Nurse / BPA Driven Protocol    [COMPLETED] Insert Peripheral IV **AND** sodium chloride    sodium chloride    sodium chloride      OBJECTIVE    Vital Signs  Vitals:    05/21/24 2318 05/22/24 0314 05/22/24 0434 05/22/24 0527   BP: 145/73 113/52  128/52   BP Location: Left arm Left arm  Left arm   Patient Position: Lying Lying  Sitting   Pulse: 86 79 69 77   Resp: 24 25 22 24   Temp: 98.8 °F (37.1 °C) 98 °F (36.7 °C)     TempSrc: Axillary Oral     SpO2: 95% 94% 93% 94%   Weight:  89.3 kg (196 lb 13.9 oz)     Height:           Flowsheet Rows      Flowsheet Row First Filed Value   Admission Height 167.6 cm (66\") Documented at 05/17/2024 2150   Admission Weight 80.7 kg (178 lb) Documented at 05/17/2024 2150              Intake/Output Summary (Last 24 hours) at 5/22/2024 0735  Last data filed at 5/22/2024 0715  Gross per 24 hour   Intake 1060 ml   Output 2450 ml   Net -1390 ml          Telemetry: Atrial fibrillation with controlled heart rate.    Physical Exam:  The patient is alert, oriented and in no distress.  Obese  Vital signs as noted above.  Head and neck revealed no " carotid bruits or jugular venous distention.  No thyromegaly or lymphadenopathy is present  Lungs clear.  No wheezing.  Breath sounds are normal bilaterally.  Heart normal first and second heart sounds.  No murmur. No precordial rub is present.  No gallop is present.  Abdomen soft and nontender.  No organomegaly is present.  Extremities with good peripheral pulses without any pedal edema.  Skin warm and dry.  Musculoskeletal system is grossly normal.  CNS grossly normal.       Results Review:  I have personally reviewed the results from the time of this admission to 5/22/2024 07:35 EDT and agree with these findings:  []  Laboratory  []  Microbiology  []  Radiology  []  EKG/Telemetry   []  Cardiology/Vascular   []  Pathology  []  Old records  []  Other:    Most notable findings include:    Lab Results (last 24 hours)       Procedure Component Value Units Date/Time    Comprehensive Metabolic Panel [[id number]]  (Abnormal) Collected: 05/22/24 0305    Specimen: Blood Updated: 05/22/24 0413     Glucose 101 mg/dL      BUN 16 mg/dL      Creatinine 0.76 mg/dL      Sodium 142 mmol/L      Potassium 3.6 mmol/L      Chloride 99 mmol/L      CO2 35.0 mmol/L      Calcium 9.7 mg/dL      Total Protein 6.3 g/dL      Albumin 3.8 g/dL      ALT (SGPT) 8 U/L      AST (SGOT) 13 U/L      Alkaline Phosphatase 58 U/L      Total Bilirubin 0.9 mg/dL      Globulin 2.5 gm/dL      A/G Ratio 1.5 g/dL      BUN/Creatinine Ratio 21.1     Anion Gap 8.0 mmol/L      eGFR 80.8 mL/min/1.73     Narrative:      GFR Normal >60  Chronic Kidney Disease <60  Kidney Failure <15    The GFR formula is only valid for adults with stable renal function between ages 18 and 70.    Magnesium [[id number]]  (Abnormal) Collected: 05/22/24 0305    Specimen: Blood Updated: 05/22/24 0413     Magnesium 1.5 mg/dL     CBC & Differential [[id number]]  (Abnormal) Collected: 05/22/24 0305    Specimen: Blood Updated: 05/22/24 0341    Narrative:      The following orders were created  for panel order CBC & Differential.  Procedure                               Abnormality         Status                     ---------                               -----------         ------                     CBC Auto Differential[[id number]]        Abnormal            Final result                 Please view results for these tests on the individual orders.    CBC Auto Differential [[id number]]  (Abnormal) Collected: 05/22/24 0305    Specimen: Blood Updated: 05/22/24 0341     WBC 6.64 10*3/mm3      RBC 3.47 10*6/mm3      Hemoglobin 10.0 g/dL      Hematocrit 34.2 %      MCV 98.6 fL      MCH 28.8 pg      MCHC 29.2 g/dL      RDW 17.2 %      RDW-SD 62.0 fl      MPV 10.2 fL      Platelets 131 10*3/mm3      Neutrophil % 79.6 %      Lymphocyte % 8.0 %      Monocyte % 9.2 %      Eosinophil % 2.1 %      Basophil % 0.5 %      Immature Grans % 0.6 %      Neutrophils, Absolute 5.29 10*3/mm3      Lymphocytes, Absolute 0.53 10*3/mm3      Monocytes, Absolute 0.61 10*3/mm3      Eosinophils, Absolute 0.14 10*3/mm3      Basophils, Absolute 0.03 10*3/mm3      Immature Grans, Absolute 0.04 10*3/mm3      nRBC 0.0 /100 WBC     Blood Culture - Blood, Arm, Left [[id number]]  (Normal) Collected: 05/18/24 0057    Specimen: Blood from Arm, Left Updated: 05/22/24 0115     Blood Culture No growth at 4 days    Blood Culture - Blood, Arm, Left [[id number]]  (Normal) Collected: 05/18/24 0057    Specimen: Blood from Arm, Left Updated: 05/22/24 0115     Blood Culture No growth at 4 days    Potassium [[id number]]  (Normal) Collected: 05/21/24 1603    Specimen: Blood from Arm, Left Updated: 05/21/24 1923     Potassium 4.4 mmol/L      Comment: Slight hemolysis detected by analyzer. Result may be falsely elevated.               Imaging Results (Last 24 Hours)       Procedure Component Value Units Date/Time    XR Chest 1 View [[id number]] Resulted: 05/22/24 0515     Updated: 05/22/24 0515    CT Abdomen Pelvis Stone Protocol [[id number]] Collected: 05/21/24  1539     Updated: 05/21/24 1549    Narrative:      CT ABDOMEN PELVIS STONE PROTOCOL    Date of Exam: 5/21/2024 3:30 PM EDT    Indication: rule out obstructive uropathy.    Comparison: CT abdomen and pelvis without contrast 11/8/2011    Technique: Axial CT images were obtained of the abdomen and pelvis without the administration of contrast. Sagittal and coronal reconstructions were performed.  Automated exposure control and iterative reconstruction methods were used.    Findings:  Lower chest demonstrates cardiomegaly and coronary atherosclerotic calcifications. Negative for pericardial effusion. Small bilateral pleural effusions. Mild smooth interlobular septal thickening at lung bases compatible with mild pulmonary edema. Mild   bibasilar atelectasis.    Lack of contrast limits assessment of abdominal organs and vasculature. Liver and spleen are normal in size and contour. Cholelithiasis. No pericholecystic inflammation. Normal right adrenal gland. Left adrenal gland nodule stable from 2011 consistent   with benign etiology likely adenoma measuring 2.1 cm. Pancreas without evidence of pancreatitis.    Negative kidneys are severely atrophic. Nonobstructing bilateral nephrolithiasis with extensive renal arterial calcifications bilaterally. Low-density right renal lesion consistent with cyst measuring 16 mm. Negative for hydronephrosis in the native   kidney or obstructive uropathy. Transplant kidney in the right lower quadrant. Negative for hydronephrosis within the transplant. No ureteral calculus in the transplant kidney which inserts at the bladder dome anteriorly. The urinary bladder is   unremarkable. There is extensive streak artifact from bilateral hip prosthesis which limits bladder assessment.    Uterus absent. Negative for adnexal mass. Negative for pneumoperitoneum. No bowel obstruction. Colonic diverticulosis without diverticulitis. Anasarca. No findings of appendicitis. Extensive vascular calcifications  of the abdominal aorta and branch   vasculature. Severe short segment stenosis suspected in the right common iliac artery due to atherosclerotic calcification (2/75).    Postsurgical changes of unilateral right-sided lumbar fixation noted with hardware at the L4-S1 levels. Chronic central compression fractures at T12 and L1 not significantly changed. Bilateral hip prosthesis in place. Remote healed fracture at the left   inferior pubic ramus. Deformity of the left medial iliac bone related to site of bone graft harvesting. Subacute fracture at the right lateral 10th rib.      Impression:      Impression:  1. No acute abnormality in the abdomen or pelvis.  2. Transplant kidney in the right lower quadrant without hydronephrosis or obstructive uropathy. Native kidneys are severely atrophic with bilateral nonobstructing nephrolithiasis.  3. Cholelithiasis.  4. Cardiomegaly with basilar interstitial pulmonary edema and small bilateral pleural effusions.  5. Additional incidental findings above.            Electronically Signed: Franck Cortez MD    5/21/2024 3:47 PM EDT    Workstation ID: [id number]            LAB RESULTS (LAST 7 DAYS)    CBC  Results from last 7 days   Lab Units 05/22/24  0305 05/21/24  0145 05/20/24  0337 05/19/24  0521 05/18/24  0509 05/17/24  2220   WBC 10*3/mm3 6.64 6.64 6.18 6.70 9.01 9.75   RBC 10*6/mm3 3.47* 3.42* 3.16* 3.45* 3.62* 3.91   HEMOGLOBIN g/dL 10.0* 10.0* 9.2* 9.9* 10.4* 11.4*   HEMATOCRIT % 34.2 34.0 31.4* 33.6* 35.3 38.8   MCV fL 98.6* 99.4* 99.4* 97.4* 97.5* 99.2*   PLATELETS 10*3/mm3 131* 121* 120* 128* 116* 125*       BMP  Results from last 7 days   Lab Units 05/22/24  0305 05/21/24  1603 05/21/24  0145 05/20/24  0337 05/19/24  0521 05/18/24  0509 05/17/24  2220   SODIUM mmol/L 142  --  142 142 140 141 141   POTASSIUM mmol/L 3.6 4.4 3.4* 3.8 4.0 5.1 6.0*   CHLORIDE mmol/L 99  --  101 104 104 105 104   CO2 mmol/L 35.0*  --  32.0* 28.8 29.0 25.0 21.0*   BUN mg/dL 16  --  16 15 20 22 22    CREATININE mg/dL 0.76  --  0.88 0.74 0.75 0.86 0.97   GLUCOSE mg/dL 101*  --  111* 113* 117* 116* 145*   MAGNESIUM mg/dL 1.5*  --   --   --   --   --   --        CMP   Results from last 7 days   Lab Units 05/22/24  0305 05/21/24  1603 05/21/24  0145 05/20/24  0337 05/19/24  0521 05/18/24  0509 05/17/24  2220   SODIUM mmol/L 142  --  142 142 140 141 141   POTASSIUM mmol/L 3.6 4.4 3.4* 3.8 4.0 5.1 6.0*   CHLORIDE mmol/L 99  --  101 104 104 105 104   CO2 mmol/L 35.0*  --  32.0* 28.8 29.0 25.0 21.0*   BUN mg/dL 16  --  16 15 20 22 22   CREATININE mg/dL 0.76  --  0.88 0.74 0.75 0.86 0.97   GLUCOSE mg/dL 101*  --  111* 113* 117* 116* 145*   ALBUMIN g/dL 3.8  --  3.9 3.3* 3.5 3.9 4.0   BILIRUBIN mg/dL 0.9  --  0.8 0.6 0.9 1.4* 2.5*   ALK PHOS U/L 58  --  56 52 51 58 65   AST (SGOT) U/L 13  --  18 16 16 22 24   ALT (SGPT) U/L 8  --  10 9 9 10 12       BNP        TROPONIN  Results from last 7 days   Lab Units 05/18/24  0057 05/17/24  2220   CK TOTAL U/L  --  208*   HSTROP T ng/L 30* 29*       CoAg        Creatinine Clearance  Estimated Creatinine Clearance: 69.8 mL/min (by C-G formula based on SCr of 0.76 mg/dL).    ABG  Results from last 7 days   Lab Units 05/17/24  2309   PH, ARTERIAL pH units 7.321*   PCO2, ARTERIAL mm Hg 41.2   PO2 ART mm Hg 105.4   O2 SATURATION ART % 97.6   BASE EXCESS ART mmol/L -4.6*       Radiology  CT Abdomen Pelvis Stone Protocol    Result Date: 5/21/2024  Impression: 1. No acute abnormality in the abdomen or pelvis. 2. Transplant kidney in the right lower quadrant without hydronephrosis or obstructive uropathy. Native kidneys are severely atrophic with bilateral nonobstructing nephrolithiasis. 3. Cholelithiasis. 4. Cardiomegaly with basilar interstitial pulmonary edema and small bilateral pleural effusions. 5. Additional incidental findings above. Electronically Signed: Franck Cortez MD  5/21/2024 3:47 PM EDT  Workstation ID: [id number]       EKG  I personally viewed and interpreted the patient's  EKG/Telemetry data:  ECG 12 Lead Bradycardia   Final Result   HEART RATE= 60  bpm   RR Interval= 1005  ms   NH Interval=   ms   P Horizontal Axis=   deg   P Front Axis=   deg   QRSD Interval= 85  ms   QT Interval=   ms   QTcB= Invalid  ms   QRS Axis= -23  deg   T Wave Axis= 119  deg   - ABNORMAL ECG -   Atrial fibrillation   Nonspecific T abnrm, anterolateral leads   When compared with ECG of 17-May-2024 22:05:58,   Significant rate decrease   Significant repolarization change   Significant axis, voltage or hypertrophy change   Electronically Signed By: Marianna Covarrubias ([facility]) 20-May-2024 15:41:27   Date and Time of Study: 2024-05-20 09:37:54      ECG 12 Lead Dyspnea   Final Result   HEART RATE= 108  bpm   RR Interval= 556  ms   NH Interval=   ms   P Horizontal Axis=   deg   P Front Axis=   deg   QRSD Interval= 79  ms   QT Interval= 333  ms   QTcB= 447  ms   QRS Axis= -33  deg   T Wave Axis= 64  deg   - ABNORMAL ECG -   Atrial fibrillation   Ventricular premature complex   Left axis deviation   Low voltage, precordial leads   Electronically Signed By: Baldev Ramirez ([facility]) 18-May-2024 06:19:24   Date and Time of Study: 2024-05-17 22:05:58      Telemetry Scan   Final Result      Telemetry Scan   Final Result      Telemetry Scan   Final Result      Telemetry Scan   Final Result      Telemetry Scan   Final Result      Telemetry Scan   Final Result      Telemetry Scan   Final Result      Telemetry Scan   Final Result      Telemetry Scan   Final Result      Telemetry Scan   Final Result      Telemetry Scan   Final Result      Telemetry Scan   Final Result      Telemetry Scan   Final Result      Telemetry Scan   Final Result      Telemetry Scan   Final Result      Telemetry Scan   Final Result            Echocardiogram:    Results for orders placed during the hospital encounter of 05/17/24    Adult Transthoracic Echo Complete W/ Cont if Necessary Per Protocol    Interpretation Summary    Left ventricular systolic function is  normal. Calculated left ventricular EF = 55% Left ventricular ejection fraction appears to be 51 - 55%.    Left ventricular diastolic function was indeterminate.    Left atrial volume is moderately increased.    The right atrial cavity is dilated.    Severe tricuspid valve regurgitation is present.    Estimated right ventricular systolic pressure from tricuspid regurgitation is markedly elevated (>55 mmHg).    Mild to moderate mitral valve regurgitation is present        Stress Test:         Cardiac Catheterization:  Results for orders placed during the hospital encounter of 08/11/22    Cardiac Catheterization/Vascular Study    Conclusion  IMPRESSIONS  Non obstructive CAD         Other:         ASSESSMENT & PLAN:    Principal Problem:    Acute UTI  Active Problems:    COPD (chronic obstructive pulmonary disease)    Atrial fibrillation with rapid ventricular response    Volume overload    Fall    Hypothyroidism, unspecified    Hyperlipidemia    History of lobectomy of lung    History of kidney transplant    History of lung cancer    Nonobstructive atherosclerosis of coronary artery    History of DVT (deep vein thrombosis)    Pulmonary hypertension    Chronic hypoxemic respiratory failure    Essential (primary) hypertension    Paroxysmal atrial fibrillation    Valvular heart disease    Atrial fibrillation  [id number] score is 5  Continue Eliquis for stroke prevention  On Toprol-XL and Cardizem for rate control  Unable to take Cardizem CD due to interaction with immunosuppressants.  TSH is normal    HFpEF/Pulmonary hypertension  Presented with proBNP of more than 8000  Diuresis per nephrology.  Monitor I's and O's and replace electrolytes as needed  Continue Toprol-XL  Okay to start Jardiance  Echocardiogram shows preserved LV function with severe tricuspid valve regurgitation and severe pulmonary hypertension.    UTI  Currently on treatment with antibiotics    History of kidney transplant  On prednisone and  Prograf  Creatinine 0.76, GFR is 80.8.  Okay to start oral diuretics today.  Bicarb is increasing.    COPD  History of lung cancer  CT shows minimal interval enlargement of the tissue surrounding the postsurgical changes associated with small bilateral pleural effusions.  Continue bronchodilators.  She should follow-up with an oncologist outpatient.    Obesity  Lifestyle modifications recommended to the patient.  BMI is 31.8      Jak Gavin MD  05/22/24  07:35 EDT

## 2024-05-23 LAB
BACTERIA SPEC AEROBE CULT: NORMAL
BACTERIA SPEC AEROBE CULT: NORMAL

## 2024-05-23 NOTE — CASE MANAGEMENT/SOCIAL WORK
Case Management Discharge Note      Final Note: Home with [facility]                Transportation Services  Private: Car    Final Discharge Disposition Code: 06 - home with home health care

## 2024-05-23 NOTE — PROGRESS NOTES
"Enter Query Response Below      Query Response: Chronic HFpEF with volume overload     Electronically signed by Jak Gavin MD, 24, 12:43 PM EDT.               If applicable, please update the problem list.   Patient: Jaja Carcamo        : 1945  Account: [id number]           Admit Date:         How to Respond to this query:       a. Click New Note     b. Answer query within the yellow box.                c. Update the Problem List, if applicable.      If you have any questions about this query contact me at: [email or web address]  [phone]      Dr. Gavin:  Dr. 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 Examination of the right-lower field reveals rales. Examination of the left-lower field reveals rales. Rales present. Right lower le+ Pitting Edema present. Left lower le+ Pitting Edema present.\"  CXR (): Small bilateral pleural effusions with vascular congestion.     cardiology note includes, \"HFpEF/Pulmonary hypertension. Presented with proBNP of more than 8000.  Continue IV diuretics. Monitor I's and O's and replace electrolytes as needed.  Continue Toprol-XL. Start Jardiance. Pending repeat echocardiogram.\"   cardiology note includes, \"Echocardiogram shows preserved LV function with severe tricuspid valve regurgitation and severe pulmonary hypertension.\"    Discharge Summary diagnosis () \"Volume overload, Pulmonary hypertension, and Valvular heart disease.\" Also noted, \" Patient was treated aggressively and will discharge on oral Lasix 80 mg twice daily with 40 mill equivalent of potassium daily.\"    Please clarify if patient treated/monitored for one or more of the " following:    Acute HFpEF   Chronic HFpEF with volume overload   Other- specify_____  Unable to determine    By submitting this query, we are merely seeking further clarification of documentation to accurately reflect all conditions that you are monitoring, evaluating, treating or that extend the hospitalization or utilize additional resources of care. Please utilize your independent clinical judgment when addressing the question(s) above.     This query and your response, once completed, will be entered into the legal medical record.    Sincerely,    ODETTE Soler, RN, CCDS  Clinical Documentation Integrity Program

## 2024-05-24 ENCOUNTER — APPOINTMENT (OUTPATIENT)
Dept: GENERAL RADIOLOGY | Facility: HOSPITAL | Age: 79
DRG: 291 | End: 2024-05-24
Payer: MEDICARE

## 2024-05-24 ENCOUNTER — READMISSION MANAGEMENT (OUTPATIENT)
Dept: CALL CENTER | Facility: HOSPITAL | Age: 79
End: 2024-05-24
Payer: MEDICARE

## 2024-05-24 ENCOUNTER — HOSPITAL ENCOUNTER (INPATIENT)
Facility: HOSPITAL | Age: 79
LOS: 10 days | Discharge: NURSING, REHAB OR LONG-TERM CARE | DRG: 291 | End: 2024-06-03
Attending: EMERGENCY MEDICINE | Admitting: INTERNAL MEDICINE
Payer: MEDICARE

## 2024-05-24 DIAGNOSIS — R06.02 SHORTNESS OF BREATH: ICD-10-CM

## 2024-05-24 DIAGNOSIS — R09.02 HYPOXIA: ICD-10-CM

## 2024-05-24 DIAGNOSIS — I48.0 PAROXYSMAL ATRIAL FIBRILLATION: Primary | Chronic | ICD-10-CM

## 2024-05-24 DIAGNOSIS — I50.9 ACUTE ON CHRONIC CONGESTIVE HEART FAILURE, UNSPECIFIED HEART FAILURE TYPE: ICD-10-CM

## 2024-05-24 LAB
ALBUMIN SERPL-MCNC: 3.6 G/DL (ref 3.5–5.2)
ALBUMIN/GLOB SERPL: 1.4 G/DL
ALP SERPL-CCNC: 59 U/L (ref 39–117)
ALT SERPL W P-5'-P-CCNC: 9 U/L (ref 1–33)
ANION GAP SERPL CALCULATED.3IONS-SCNC: 10.1 MMOL/L (ref 5–15)
APTT PPP: 30.9 SECONDS (ref 61–76.5)
ARTERIAL PATENCY WRIST A: POSITIVE
AST SERPL-CCNC: 16 U/L (ref 1–32)
ATMOSPHERIC PRESS: ABNORMAL MM[HG]
B PARAPERT DNA SPEC QL NAA+PROBE: NOT DETECTED
B PERT DNA SPEC QL NAA+PROBE: NOT DETECTED
BASE EXCESS BLDA CALC-SCNC: 9.6 MMOL/L (ref 0–3)
BASOPHILS # BLD AUTO: 0.03 10*3/MM3 (ref 0–0.2)
BASOPHILS NFR BLD AUTO: 0.4 % (ref 0–1.5)
BDY SITE: ABNORMAL
BILIRUB SERPL-MCNC: 1.2 MG/DL (ref 0–1.2)
BUN SERPL-MCNC: 21 MG/DL (ref 8–23)
BUN/CREAT SERPL: 18.1 (ref 7–25)
C PNEUM DNA NPH QL NAA+NON-PROBE: NOT DETECTED
CALCIUM SPEC-SCNC: 10.1 MG/DL (ref 8.6–10.5)
CHLORIDE SERPL-SCNC: 94 MMOL/L (ref 98–107)
CO2 BLDA-SCNC: 36.6 MMOL/L (ref 22–29)
CO2 SERPL-SCNC: 32.9 MMOL/L (ref 22–29)
CREAT SERPL-MCNC: 1.16 MG/DL (ref 0.57–1)
D DIMER PPP FEU-MCNC: 1.6 MG/L (FEU) (ref 0–0.77)
D-LACTATE SERPL-SCNC: 2 MMOL/L (ref 0.5–2)
D-LACTATE SERPL-SCNC: 2.4 MMOL/L (ref 0.3–2)
DEPRECATED RDW RBC AUTO: 62.1 FL (ref 37–54)
EGFRCR SERPLBLD CKD-EPI 2021: 48.7 ML/MIN/1.73
EOSINOPHIL # BLD AUTO: 0.09 10*3/MM3 (ref 0–0.4)
EOSINOPHIL NFR BLD AUTO: 1.2 % (ref 0.3–6.2)
ERYTHROCYTE [DISTWIDTH] IN BLOOD BY AUTOMATED COUNT: 16.9 % (ref 12.3–15.4)
FLUAV SUBTYP SPEC NAA+PROBE: NOT DETECTED
FLUBV RNA ISLT QL NAA+PROBE: NOT DETECTED
GEN 5 2HR TROPONIN T REFLEX: 35 NG/L
GLOBULIN UR ELPH-MCNC: 2.5 GM/DL
GLUCOSE SERPL-MCNC: 154 MG/DL (ref 65–99)
HADV DNA SPEC NAA+PROBE: NOT DETECTED
HCO3 BLDA-SCNC: 35 MMOL/L (ref 21–28)
HCOV 229E RNA SPEC QL NAA+PROBE: NOT DETECTED
HCOV HKU1 RNA SPEC QL NAA+PROBE: NOT DETECTED
HCOV NL63 RNA SPEC QL NAA+PROBE: NOT DETECTED
HCOV OC43 RNA SPEC QL NAA+PROBE: NOT DETECTED
HCT VFR BLD AUTO: 32.7 % (ref 34–46.6)
HEMODILUTION: NO
HGB BLD-MCNC: 9.6 G/DL (ref 12–15.9)
HMPV RNA NPH QL NAA+NON-PROBE: NOT DETECTED
HPIV1 RNA ISLT QL NAA+PROBE: NOT DETECTED
HPIV2 RNA SPEC QL NAA+PROBE: NOT DETECTED
HPIV3 RNA NPH QL NAA+PROBE: NOT DETECTED
HPIV4 P GENE NPH QL NAA+PROBE: NOT DETECTED
IMM GRANULOCYTES # BLD AUTO: 0.04 10*3/MM3 (ref 0–0.05)
IMM GRANULOCYTES NFR BLD AUTO: 0.5 % (ref 0–0.5)
INHALED O2 CONCENTRATION: 40 %
INR PPP: 1.16 (ref 0.93–1.1)
LYMPHOCYTES # BLD AUTO: 0.36 10*3/MM3 (ref 0.7–3.1)
LYMPHOCYTES NFR BLD AUTO: 4.6 % (ref 19.6–45.3)
M PNEUMO IGG SER IA-ACNC: NOT DETECTED
MAGNESIUM SERPL-MCNC: 1.8 MG/DL (ref 1.6–2.4)
MCH RBC QN AUTO: 29.2 PG (ref 26.6–33)
MCHC RBC AUTO-ENTMCNC: 29.4 G/DL (ref 31.5–35.7)
MCV RBC AUTO: 99.4 FL (ref 79–97)
MODALITY: ABNORMAL
MONOCYTES # BLD AUTO: 0.58 10*3/MM3 (ref 0.1–0.9)
MONOCYTES NFR BLD AUTO: 7.5 % (ref 5–12)
NEUTROPHILS NFR BLD AUTO: 6.65 10*3/MM3 (ref 1.7–7)
NEUTROPHILS NFR BLD AUTO: 85.8 % (ref 42.7–76)
NRBC BLD AUTO-RTO: 0 /100 WBC (ref 0–0.2)
NT-PROBNP SERPL-MCNC: 4867 PG/ML (ref 0–1800)
PCO2 BLDA: 51.4 MM HG (ref 35–48)
PH BLDA: 7.44 PH UNITS (ref 7.35–7.45)
PLATELET # BLD AUTO: 135 10*3/MM3 (ref 140–450)
PMV BLD AUTO: 9.8 FL (ref 6–12)
PO2 BLDA: 56.7 MM HG (ref 83–108)
POTASSIUM SERPL-SCNC: 4 MMOL/L (ref 3.5–5.2)
PROT SERPL-MCNC: 6.1 G/DL (ref 6–8.5)
PROTHROMBIN TIME: 12.5 SECONDS (ref 9.6–11.7)
RBC # BLD AUTO: 3.29 10*6/MM3 (ref 3.77–5.28)
RHINOVIRUS RNA SPEC NAA+PROBE: NOT DETECTED
RSV RNA NPH QL NAA+NON-PROBE: NOT DETECTED
SAO2 % BLDCOA: 89.5 % (ref 94–98)
SARS-COV-2 RNA NPH QL NAA+NON-PROBE: NOT DETECTED
SODIUM SERPL-SCNC: 137 MMOL/L (ref 136–145)
T4 FREE SERPL-MCNC: 1.42 NG/DL (ref 0.93–1.7)
TROPONIN T DELTA: -8 NG/L
TROPONIN T SERPL HS-MCNC: 43 NG/L
WBC NRBC COR # BLD AUTO: 7.75 10*3/MM3 (ref 3.4–10.8)

## 2024-05-24 PROCEDURE — 0202U NFCT DS 22 TRGT SARS-COV-2: CPT | Performed by: INTERNAL MEDICINE

## 2024-05-24 PROCEDURE — 36600 WITHDRAWAL OF ARTERIAL BLOOD: CPT

## 2024-05-24 PROCEDURE — 85025 COMPLETE CBC W/AUTO DIFF WBC: CPT

## 2024-05-24 PROCEDURE — 83605 ASSAY OF LACTIC ACID: CPT

## 2024-05-24 PROCEDURE — 83880 ASSAY OF NATRIURETIC PEPTIDE: CPT

## 2024-05-24 PROCEDURE — 25010000002 CEFTRIAXONE PER 250 MG

## 2024-05-24 PROCEDURE — 80053 COMPREHEN METABOLIC PANEL: CPT

## 2024-05-24 PROCEDURE — 99285 EMERGENCY DEPT VISIT HI MDM: CPT

## 2024-05-24 PROCEDURE — 25010000002 FUROSEMIDE PER 20 MG

## 2024-05-24 PROCEDURE — 93005 ELECTROCARDIOGRAM TRACING: CPT | Performed by: EMERGENCY MEDICINE

## 2024-05-24 PROCEDURE — 82803 BLOOD GASES ANY COMBINATION: CPT

## 2024-05-24 PROCEDURE — 84439 ASSAY OF FREE THYROXINE: CPT | Performed by: INTERNAL MEDICINE

## 2024-05-24 PROCEDURE — 71045 X-RAY EXAM CHEST 1 VIEW: CPT

## 2024-05-24 PROCEDURE — 85379 FIBRIN DEGRADATION QUANT: CPT

## 2024-05-24 PROCEDURE — 85730 THROMBOPLASTIN TIME PARTIAL: CPT

## 2024-05-24 PROCEDURE — 87040 BLOOD CULTURE FOR BACTERIA: CPT

## 2024-05-24 PROCEDURE — 63710000001 TACROLIMUS PER 1 MG: Performed by: INTERNAL MEDICINE

## 2024-05-24 PROCEDURE — 84484 ASSAY OF TROPONIN QUANT: CPT

## 2024-05-24 PROCEDURE — 83735 ASSAY OF MAGNESIUM: CPT | Performed by: INTERNAL MEDICINE

## 2024-05-24 PROCEDURE — 36415 COLL VENOUS BLD VENIPUNCTURE: CPT

## 2024-05-24 PROCEDURE — 85610 PROTHROMBIN TIME: CPT

## 2024-05-24 PROCEDURE — 93005 ELECTROCARDIOGRAM TRACING: CPT

## 2024-05-24 RX ORDER — POLYETHYLENE GLYCOL 3350 17 G/17G
17 POWDER, FOR SOLUTION ORAL DAILY PRN
Status: DISCONTINUED | OUTPATIENT
Start: 2024-05-24 | End: 2024-05-24

## 2024-05-24 RX ORDER — GUAIFENESIN 600 MG/1
1200 TABLET, EXTENDED RELEASE ORAL 2 TIMES DAILY
Status: DISCONTINUED | OUTPATIENT
Start: 2024-05-24 | End: 2024-06-03 | Stop reason: HOSPADM

## 2024-05-24 RX ORDER — ONDANSETRON 2 MG/ML
4 INJECTION INTRAMUSCULAR; INTRAVENOUS EVERY 6 HOURS PRN
Status: DISCONTINUED | OUTPATIENT
Start: 2024-05-24 | End: 2024-06-03 | Stop reason: HOSPADM

## 2024-05-24 RX ORDER — FUROSEMIDE 40 MG/1
80 TABLET ORAL
Status: DISCONTINUED | OUTPATIENT
Start: 2024-05-25 | End: 2024-05-24

## 2024-05-24 RX ORDER — FUROSEMIDE 40 MG/1
80 TABLET ORAL DAILY
Status: DISCONTINUED | OUTPATIENT
Start: 2024-05-25 | End: 2024-05-25

## 2024-05-24 RX ORDER — SODIUM CHLORIDE 0.9 % (FLUSH) 0.9 %
10 SYRINGE (ML) INJECTION AS NEEDED
Status: DISCONTINUED | OUTPATIENT
Start: 2024-05-24 | End: 2024-06-03 | Stop reason: HOSPADM

## 2024-05-24 RX ORDER — DIAZEPAM 5 MG/1
5 TABLET ORAL 2 TIMES DAILY PRN
Status: DISCONTINUED | OUTPATIENT
Start: 2024-05-24 | End: 2024-06-03 | Stop reason: HOSPADM

## 2024-05-24 RX ORDER — FAMOTIDINE 20 MG/1
20 TABLET, FILM COATED ORAL DAILY
Status: DISCONTINUED | OUTPATIENT
Start: 2024-05-24 | End: 2024-05-28

## 2024-05-24 RX ORDER — ONDANSETRON 4 MG/1
4 TABLET, ORALLY DISINTEGRATING ORAL EVERY 6 HOURS PRN
Status: DISCONTINUED | OUTPATIENT
Start: 2024-05-24 | End: 2024-06-03 | Stop reason: HOSPADM

## 2024-05-24 RX ORDER — PREDNISONE 5 MG/1
5 TABLET ORAL
Status: DISCONTINUED | OUTPATIENT
Start: 2024-05-25 | End: 2024-06-03 | Stop reason: HOSPADM

## 2024-05-24 RX ORDER — FUROSEMIDE 10 MG/ML
40 INJECTION INTRAMUSCULAR; INTRAVENOUS ONCE
Status: COMPLETED | OUTPATIENT
Start: 2024-05-24 | End: 2024-05-24

## 2024-05-24 RX ORDER — TACROLIMUS 1 MG/1
1 CAPSULE ORAL 2 TIMES DAILY
Status: DISCONTINUED | OUTPATIENT
Start: 2024-05-24 | End: 2024-06-03 | Stop reason: HOSPADM

## 2024-05-24 RX ORDER — HYDROCODONE BITARTRATE AND ACETAMINOPHEN 5; 325 MG/1; MG/1
1 TABLET ORAL EVERY 8 HOURS PRN
Status: DISPENSED | OUTPATIENT
Start: 2024-05-24 | End: 2024-05-29

## 2024-05-24 RX ORDER — BISACODYL 5 MG/1
5 TABLET, DELAYED RELEASE ORAL DAILY PRN
Status: DISCONTINUED | OUTPATIENT
Start: 2024-05-24 | End: 2024-05-24

## 2024-05-24 RX ORDER — CHOLESTYRAMINE LIGHT 4 G/5.7G
1 POWDER, FOR SUSPENSION ORAL DAILY
Status: DISCONTINUED | OUTPATIENT
Start: 2024-05-25 | End: 2024-06-03 | Stop reason: HOSPADM

## 2024-05-24 RX ORDER — METOPROLOL SUCCINATE 50 MG/1
50 TABLET, EXTENDED RELEASE ORAL DAILY
Status: DISCONTINUED | OUTPATIENT
Start: 2024-05-25 | End: 2024-06-03 | Stop reason: HOSPADM

## 2024-05-24 RX ORDER — AMOXICILLIN 250 MG
2 CAPSULE ORAL 2 TIMES DAILY PRN
Status: DISCONTINUED | OUTPATIENT
Start: 2024-05-24 | End: 2024-05-24

## 2024-05-24 RX ORDER — LEVOTHYROXINE SODIUM 0.05 MG/1
50 TABLET ORAL
Status: DISCONTINUED | OUTPATIENT
Start: 2024-05-25 | End: 2024-06-03 | Stop reason: HOSPADM

## 2024-05-24 RX ORDER — DULOXETIN HYDROCHLORIDE 20 MG/1
40 CAPSULE, DELAYED RELEASE ORAL DAILY
Status: DISCONTINUED | OUTPATIENT
Start: 2024-05-25 | End: 2024-06-03 | Stop reason: HOSPADM

## 2024-05-24 RX ORDER — POTASSIUM CHLORIDE 20 MEQ/1
40 TABLET, EXTENDED RELEASE ORAL DAILY
Status: DISCONTINUED | OUTPATIENT
Start: 2024-05-25 | End: 2024-06-03 | Stop reason: HOSPADM

## 2024-05-24 RX ORDER — HYDRALAZINE HYDROCHLORIDE 20 MG/ML
10 INJECTION INTRAMUSCULAR; INTRAVENOUS EVERY 6 HOURS PRN
Status: DISCONTINUED | OUTPATIENT
Start: 2024-05-24 | End: 2024-06-03 | Stop reason: HOSPADM

## 2024-05-24 RX ORDER — NITROGLYCERIN 0.4 MG/1
0.4 TABLET SUBLINGUAL
Status: DISCONTINUED | OUTPATIENT
Start: 2024-05-24 | End: 2024-06-03 | Stop reason: HOSPADM

## 2024-05-24 RX ORDER — ACETAMINOPHEN 325 MG/1
650 TABLET ORAL EVERY 4 HOURS PRN
Status: DISCONTINUED | OUTPATIENT
Start: 2024-05-24 | End: 2024-06-03 | Stop reason: HOSPADM

## 2024-05-24 RX ORDER — FERROUS SULFATE 324(65)MG
324 TABLET, DELAYED RELEASE (ENTERIC COATED) ORAL
Status: DISCONTINUED | OUTPATIENT
Start: 2024-05-25 | End: 2024-06-03 | Stop reason: HOSPADM

## 2024-05-24 RX ORDER — CARBOXYMETHYLCELLULOSE SODIUM 10 MG/ML
1 GEL OPHTHALMIC 3 TIMES DAILY PRN
Status: DISCONTINUED | OUTPATIENT
Start: 2024-05-24 | End: 2024-06-03 | Stop reason: HOSPADM

## 2024-05-24 RX ORDER — LOPERAMIDE HYDROCHLORIDE 2 MG/1
2 CAPSULE ORAL 4 TIMES DAILY PRN
Status: DISCONTINUED | OUTPATIENT
Start: 2024-05-24 | End: 2024-06-03 | Stop reason: HOSPADM

## 2024-05-24 RX ORDER — SODIUM CHLORIDE 0.9 % (FLUSH) 0.9 %
10 SYRINGE (ML) INJECTION EVERY 12 HOURS SCHEDULED
Status: DISCONTINUED | OUTPATIENT
Start: 2024-05-24 | End: 2024-06-03 | Stop reason: HOSPADM

## 2024-05-24 RX ORDER — SODIUM CHLORIDE 9 MG/ML
40 INJECTION, SOLUTION INTRAVENOUS AS NEEDED
Status: DISCONTINUED | OUTPATIENT
Start: 2024-05-24 | End: 2024-06-03 | Stop reason: HOSPADM

## 2024-05-24 RX ORDER — BISACODYL 10 MG
10 SUPPOSITORY, RECTAL RECTAL DAILY PRN
Status: DISCONTINUED | OUTPATIENT
Start: 2024-05-24 | End: 2024-05-24

## 2024-05-24 RX ADMIN — DIAZEPAM 5 MG: 5 TABLET ORAL at 21:13

## 2024-05-24 RX ADMIN — HYDROCODONE BITARTRATE AND ACETAMINOPHEN 1 TABLET: 5; 325 TABLET ORAL at 22:57

## 2024-05-24 RX ADMIN — FUROSEMIDE 40 MG: 10 INJECTION, SOLUTION INTRAMUSCULAR; INTRAVENOUS at 13:52

## 2024-05-24 RX ADMIN — CEFTRIAXONE 2000 MG: 2 INJECTION, POWDER, FOR SOLUTION INTRAMUSCULAR; INTRAVENOUS at 13:51

## 2024-05-24 RX ADMIN — TACROLIMUS 1 MG: 1 CAPSULE ORAL at 21:13

## 2024-05-24 RX ADMIN — Medication 10 ML: at 13:53

## 2024-05-24 RX ADMIN — GUAIFENESIN 1200 MG: 600 TABLET, EXTENDED RELEASE ORAL at 21:13

## 2024-05-24 RX ADMIN — APIXABAN 5 MG: 5 TABLET, FILM COATED ORAL at 21:13

## 2024-05-24 RX ADMIN — DILTIAZEM HYDROCHLORIDE 90 MG: 60 TABLET, FILM COATED ORAL at 21:13

## 2024-05-24 RX ADMIN — DILTIAZEM HYDROCHLORIDE 90 MG: 60 TABLET, FILM COATED ORAL at 17:51

## 2024-05-24 RX ADMIN — FAMOTIDINE 20 MG: 20 TABLET, FILM COATED ORAL at 17:51

## 2024-05-24 NOTE — PROGRESS NOTES
RENAL/KCC:    Consult received, chart reviewed.  Patient with ESRD s/p DDKT in 2017 with baseline Cr 0.8 who was recently admitted with fluid overload, UTI and rapid A-fib.  She was discharged on Lasix 80 mg BID.  She presents now with weakness and SOA.  Cr 1.16 on admission.  Lasix decreased to 80 mg PO daily.  Will reassess with AM labs including Prograf level.  Fluid restrict and minimize sodium.  Will follow along.    Naun Falcon M.D.  [facility]

## 2024-05-24 NOTE — ED PROVIDER NOTES
Subjective   History of Present Illness  Chief Complaint: Shortness of breath      HPI: Patient is a 79-year-old female who presents by private vehicle with complaints of shortness of breath was discharged from our facility approximately 2 days ago for similar symptoms was started on Cardizem as she had atrial fibrillation with RVR.  She denies associated chest pain she typically wears 3 L nasal cannula at home.  She has had a nonproductive cough.  She feels that she is smothering with fluid.    PCP: Jeremy        Review of Systems  See above as noted     Past Medical History:   Diagnosis Date    Allergic rhinitis 04/14/2015    Asthma 08/10/2017    Atrial flutter 05/11/2017    Chronic diarrhea 04/15/2019    Chronic hypoxemic respiratory failure 01/18/2024    Chronic pain 02/03/2015    COPD (chronic obstructive pulmonary disease)     COVID-19 virus detected 08/11/2022    Cytokine release syndrome, grade 1 08/16/2022    Edema of both lower extremities due to peripheral venous insufficiency 03/12/2021    ESRD on hemodialysis 05/22/2013    Essential (primary) hypertension 03/03/2022    Fracture of ulnar styloid 05/19/2022    Fracture of unspecified carpal bone, right wrist, subsequent encounter for fracture with routine healing 03/03/2022    Gastroesophageal reflux disease 11/12/2020    Gout 08/10/2017    Hearing loss 08/10/2017    History of appendectomy     History of DVT (deep vein thrombosis) 11/12/2020    History of kidney transplant 06/30/2017    History of lobectomy of lung 01/18/2024 03/08/2016:  RIGHT Lower Lobe Mass--> Right Video-assisted thoracoscopy with a moderate-to-large wedge resection of the RLL (by Dr. Haris Mcconnell @ [facility])--> Poorly differentiated carcinoma of the RLL.      History of repair of hip joint 05/21/2013    History of suicide attempt 05/20/2024    Hyperlipidemia 08/11/2014    Hypothyroidism, unspecified 03/03/2022    Infection due to extended spectrum beta lactamase (ESBL)  producing bacteria 03/11/2016    No A2K system hx. +ESBL E coli urine on 3/11/16.      Irritable bowel syndrome 04/15/2019    Long term (current) use of immunosuppressive biologic 04/28/2021    Long term current use of anticoagulant therapy 01/18/2024    Malignant neoplasm of lung 08/10/2017    Menopausal flushing 08/30/2021    Mitral valve regurgitation 07/06/2015    Mixed anxiety and depressive disorder 04/30/2015    Non-small cell lung cancer 08/10/2017    Nonobstructive atherosclerosis of coronary artery 06/02/2019 08/12/2022: [facility] N[facility] with Covid-19: LM:-nl;  LAD: diffuse, Ca++; 30%; CIRC: Dominant. Normal; RCA: small; 50% diffuse, proximal and mid-segment.      NSTEMI (non-ST elevated myocardial infarction) 08/11/2022    Obsessive-compulsive disorder 04/15/2019    Osteoarthritis 05/20/2024    Paroxysmal atrial fibrillation 05/11/2017    Paroxysmal supraventricular tachycardia 05/11/2017    Peripheral neuropathy 08/11/2014    Personal history of peptic ulcer disease 11/12/2020    Postoperative anemia due to acute blood loss 05/22/2013    Right wrist fracture 03/01/2022    Seasonal allergies 08/10/2017    Secondary hyperparathyroidism of renal origin 09/14/2015    Tricuspid valve regurgitation 03/15/2017    Ulcer of lower extremity 08/30/2021    Unspecified acute appendicitis 03/03/2022    Valvular heart disease 05/20/2024    Wegener's granulomatosis with renal involvement 03/03/2022       Allergies   Allergen Reactions    Zolpidem Other (See Comments), Unknown (See Comments) and Unknown - High Severity     KEPT HER AWAKE  KEPT HER AWAKE  KEPT HER AWAKE  KEPT HER AWAKE         Past Surgical History:   Procedure Laterality Date    APPENDECTOMY      BREAST SURGERY Left     cysts rmeoved    CARDIAC CATHETERIZATION Right 08/12/2022    Procedure: Left Heart Cath and coronary angiogram;  Surgeon: Jak Gavin MD;  Location: [facility] CATH INVASIVE LOCATION;  Service: Cardiology;  Laterality:  Right;    CLOSED REDUCTION WRIST FRACTURE Right 03/01/2022    Procedure: WRIST CLOSED REDUCTION;  Surgeon: Gaudencio Townsend MD;  Location: [facility] MAIN OR;  Service: Orthopedics;  Laterality: Right;    CYSTOSCOPY      HIP ARTHROPLASTY      HYSTERECTOMY      LUNG LOBECTOMY Right     TRANSPLANTATION RENAL         Family History   Problem Relation Age of Onset    No Known Problems Mother     No Known Problems Father        Social History     Socioeconomic History    Marital status:    Tobacco Use    Smoking status: Former    Smokeless tobacco: Never   Vaping Use    Vaping status: Former   Substance and Sexual Activity    Alcohol use: Never    Drug use: Never    Sexual activity: Defer           Objective   Physical Exam  Vitals reviewed.   Constitutional:       Appearance: She is ill-appearing. She is not toxic-appearing.   HENT:      Head: Normocephalic.   Neck:      Comments: No JVD  Cardiovascular:      Rate and Rhythm: Normal rate. Rhythm irregular.      Pulses: Normal pulses.      Heart sounds: Normal heart sounds. No murmur heard.     Arteriovenous access: Right arteriovenous access is present.     Comments: Thrill and bruit noted  Pulmonary:      Effort: Tachypnea present.      Breath sounds: No stridor. No rales.   Musculoskeletal:      Right lower leg: Edema present.      Left lower leg: Edema present.   Skin:     Coloration: Skin is pale.   Neurological:      General: No focal deficit present.      Mental Status: She is alert and oriented to person, place, and time.         Procedures  EKG obtained per my independent interpretation atrial fibrillation with a rate of 82 compared to previous that was obtained 5/20/2024 atrial fib rate of 60 no acute changes today reviewed with Dr. Morales who agrees with interpretation.                 ED Course  ED Course as of 05/24/24 1406   Fri May 24, 2024   1212 Lactate(!!): 2.4 []      ED Course User Index  [] Mila Theodore, TONYA      /60   Pulse 64   Temp  "97.8 °F (36.6 °C) (Oral)   Resp 20   Ht 167.6 cm (66\")   Wt 86.2 kg (190 lb)   SpO2 98%   BMI 30.67 kg/m²   Labs Reviewed   COMPREHENSIVE METABOLIC PANEL - Abnormal; Notable for the following components:       Result Value    Glucose 154 (*)     Creatinine 1.16 (*)     Chloride 94 (*)     CO2 32.9 (*)     eGFR 48.7 (*)     All other components within normal limits    Narrative:     GFR Normal >60  Chronic Kidney Disease <60  Kidney Failure <15    The GFR formula is only valid for adults with stable renal function between ages 18 and 70.   PROTIME-INR - Abnormal; Notable for the following components:    Protime 12.5 (*)     INR 1.16 (*)     All other components within normal limits   APTT - Abnormal; Notable for the following components:    PTT 30.9 (*)     All other components within normal limits   BNP (IN-HOUSE) - Abnormal; Notable for the following components:    proBNP 4,867.0 (*)     All other components within normal limits    Narrative:     This assay is used as an aid in the diagnosis of individuals suspected of having heart failure. It can be used as an aid in the diagnosis of acute decompensated heart failure (ADHF) in patients presenting with signs and symptoms of ADHF to the emergency department (ED). In addition, NT-proBNP of <300 pg/mL indicates ADHF is not likely.    Age Range Result Interpretation  NT-proBNP Concentration (pg/mL:      <50             Positive            >450                   Gray                 300-450                    Negative             <300    50-75           Positive            >900                  Gray                300-900                  Negative            <300      >75             Positive            >1800                  Gray                300-1800                  Negative            <300   TROPONIN - Abnormal; Notable for the following components:    HS Troponin T 43 (*)     All other components within normal limits    Narrative:     High Sensitive Troponin " "T Reference Range:  <14.0 ng/L- Negative Female for AMI  <22.0 ng/L- Negative Male for AMI  >=14 - Abnormal Female indicating possible myocardial injury.  >=22 - Abnormal Male indicating possible myocardial injury.   Clinicians would have to utilize clinical acumen, EKG, Troponin, and serial changes to determine if it is an Acute Myocardial Infarction or myocardial injury due to an underlying chronic condition.        D-DIMER, QUANTITATIVE - Abnormal; Notable for the following components:    D-Dimer, Quantitative 1.60 (*)     All other components within normal limits    Narrative:     According to the assay 's published package insert, a normal (<0.50 mg/L (FEU)) D-dimer result in conjunction with a non-high clinical probability assessment, excludes deep vein thrombosis (DVT) and pulmonary embolism (PE) with high sensitivity.    D-dimer values increase with age and this can make VTE exclusion of an older population difficult. To address this, the [employer], based on best available evidence and recent guidelines, recommends that clinicians use age-adjusted D-dimer thresholds in patients greater than 50 years of age with: a) a low probability of PE who do not meet all Pulmonary Embolism Rule Out Criteria, or b) in those with intermediate probability of PE.   The formula for an age-adjusted D-dimer cut-off is \"age/100\".  For example, a 60 year old patient would have an age-adjusted cut-off of 0.60 mg/L (FEU) and an 80 year old 0.80 mg/L (FEU).   CBC WITH AUTO DIFFERENTIAL - Abnormal; Notable for the following components:    RBC 3.29 (*)     Hemoglobin 9.6 (*)     Hematocrit 32.7 (*)     MCV 99.4 (*)     MCHC 29.4 (*)     RDW 16.9 (*)     RDW-SD 62.1 (*)     Platelets 135 (*)     Neutrophil % 85.8 (*)     Lymphocyte % 4.6 (*)     Lymphocytes, Absolute 0.36 (*)     All other components within normal limits   BLOOD GAS, ARTERIAL - Abnormal; Notable for the following components:    pCO2, " Arterial 51.4 (*)     pO2, Arterial 56.7 (*)     HCO3, Arterial 35.0 (*)     Base Excess, Arterial 9.6 (*)     O2 Saturation, Arterial 89.5 (*)     CO2 Content 36.6 (*)     All other components within normal limits   POC LACTATE - Abnormal; Notable for the following components:    Lactate 2.4 (*)     All other components within normal limits   BLOOD CULTURE   BLOOD CULTURE   RESPIRATORY PANEL PCR W/ COVID-19 (SARS-COV-2), NP SWAB IN UTM/VTP, 2 HR TAT   BLOOD GAS, ARTERIAL   LACTIC ACID, REFLEX   HIGH SENSITIVITIY TROPONIN T 2HR   CBC AND DIFFERENTIAL    Narrative:     The following orders were created for panel order CBC & Differential.  Procedure                               Abnormality         Status                     ---------                               -----------         ------                     CBC Auto Differential[[id number]]        Abnormal            Final result                 Please view results for these tests on the individual orders.     Medications   sodium chloride 0.9 % flush 10 mL (has no administration in time range)   cefTRIAXone (ROCEPHIN) 2,000 mg in sodium chloride 0.9 % 100 mL MBP (2,000 mg Intravenous New Bag 5/24/24 1351)   sodium chloride 0.9 % flush 10 mL (10 mL Intravenous Given 5/24/24 1353)   sodium chloride 0.9 % flush 10 mL (has no administration in time range)   sodium chloride 0.9 % infusion 40 mL (has no administration in time range)   nitroglycerin (NITROSTAT) SL tablet 0.4 mg (has no administration in time range)   Potassium Replacement - Follow Nurse / BPA Driven Protocol (has no administration in time range)   Magnesium Standard Dose Replacement - Follow Nurse / BPA Driven Protocol (has no administration in time range)   Phosphorus Replacement - Follow Nurse / BPA Driven Protocol (has no administration in time range)   Calcium Replacement - Follow Nurse / BPA Driven Protocol (has no administration in time range)   acetaminophen (TYLENOL) tablet 650 mg (has no  administration in time range)   sennosides-docusate (PERICOLACE) 8.6-50 MG per tablet 2 tablet (has no administration in time range)     And   polyethylene glycol (MIRALAX) packet 17 g (has no administration in time range)     And   bisacodyl (DULCOLAX) EC tablet 5 mg (has no administration in time range)     And   bisacodyl (DULCOLAX) suppository 10 mg (has no administration in time range)   ondansetron ODT (ZOFRAN-ODT) disintegrating tablet 4 mg (has no administration in time range)     Or   ondansetron (ZOFRAN) injection 4 mg (has no administration in time range)   hydrALAZINE (APRESOLINE) injection 10 mg (has no administration in time range)   Pharmacy to dose warfarin (has no administration in time range)   Pharmacy to Dose enoxaparin (LOVENOX) (has no administration in time range)   furosemide (LASIX) injection 40 mg (40 mg Intravenous Given 5/24/24 1352)     XR Chest 1 View    Result Date: 5/24/2024  Impression: 1. Cardiomegaly with pulmonary vascular congestion and small pleural effusions 2. Probable COPD with left basilar atelectasis Electronically Signed: Ricardo Chavarria  5/24/2024 12:38 PM EDT  Workstation ID: [id number]                                          Medical Decision Making  Patient presented with above complaints, noted physical exam was completed he was placed on a cardiac monitor and IV was established notable hypoxia upon arrival with O2 sats of 60% she was increased to 5 L ABG was obtained which noted a pO2 of 56.7 increased to 6 L.  D-dimer was elevated at 1.60 though patient is anticoagulated currently on Eliquis reportedly compliant with the medications.  CMP notes a creatinine of 1.16, at baseline she is 0.76.  proBNP elevated 4867 approximately 1 week ago, she was at 8040.  She was treated with lasix.  Trop elevated at 43, most recent at 30.  HGB at 9.6, baseline anemia.  Patient triggered hospital sepsis protocol lactic acid at 2.4 blood cultures were added and pending.  She was started  on Rocephin.  Patient case was discussed with Dr. Campoverde.  Return admission, spoke with Janene Archer nurse practitioner who agreed to patient admission.  At bedside patient was updated on plan of care, denied further questions or complaints.    Chart review:  5/20/2024 Echo    ·  Left ventricular systolic function is normal. Calculated left ventricular EF = 55% Left ventricular ejection fraction appears to be 51 - 55%.  ·  Left ventricular diastolic function was indeterminate.  ·  Left atrial volume is moderately increased.  ·  The right atrial cavity is dilated.  ·  Severe tricuspid valve regurgitation is present.  ·  Estimated right ventricular systolic pressure from tricuspid regurgitation is markedly elevated (>55 mmHg).  ·  Mild to moderate mitral valve regurgitation is present         Note Disclaimer: At [facility], we believe that sharing information builds trust and better  relationships. You are receiving this note because you recently visited [facility]. It is possible you will see health information before a provider has talked with you about it. This kind of information can be easy to misunderstand. To help you fully understand what it means for your health, we urge you to discuss this note with your provider.       Part of this note may be an electronic transcription/translation of spoken language to printed text using the Dragon Dictation System.    Appropriate PPE worn during exam.    Problems Addressed:  Acute on chronic congestive heart failure, unspecified heart failure type: complicated acute illness or injury  Hypoxia: complicated acute illness or injury  Shortness of breath: complicated acute illness or injury    Amount and/or Complexity of Data Reviewed  Labs: ordered. Decision-making details documented in ED Course.  Radiology: ordered.  ECG/medicine tests: ordered.    Risk  Prescription drug management.  Decision regarding hospitalization.        Final diagnoses:   Acute on chronic  congestive heart failure, unspecified heart failure type   Shortness of breath   Hypoxia       ED Disposition  ED Disposition       ED Disposition   Decision to Admit    Condition   --    Comment   Level of Care: Telemetry [5]   Diagnosis: Acute exacerbation of CHF (congestive heart failure) [587759]   Admitting Physician: KAREN HANNA [[id number]]   Attending Physician: KAREN HANNA [[id number]]   Certification: I Certify That Inpatient Hospital Services Are Medically Necessary For Greater Than 2 Midnights                 No follow-up provider specified.       Medication List      No changes were made to your prescriptions during this visit.            Mila Theodore, APRN  05/24/24 1406

## 2024-05-24 NOTE — Clinical Note
Level of Care: Telemetry [5]   Admitting Physician: KAREN HANNA [[id number]]   Attending Physician: KAREN HANNA [[id number]]

## 2024-05-24 NOTE — PLAN OF CARE
Problem: Adult Inpatient Plan of Care  Goal: Plan of Care Review  Outcome: Ongoing, Progressing  Goal: Patient-Specific Goal (Individualized)  Outcome: Ongoing, Progressing  Goal: Absence of Hospital-Acquired Illness or Injury  Outcome: Ongoing, Progressing  Intervention: Identify and Manage Fall Risk  Recent Flowsheet Documentation  Taken 5/24/2024 1800 by Smita Davis, RN  Safety Promotion/Fall Prevention: safety round/check completed  Goal: Optimal Comfort and Wellbeing  Outcome: Ongoing, Progressing  Intervention: Provide Person-Centered Care  Recent Flowsheet Documentation  Taken 5/24/2024 1500 by Smita Davis, RN  Trust Relationship/Rapport: care explained  Goal: Readiness for Transition of Care  Outcome: Ongoing, Progressing  Intervention: Mutually Develop Transition Plan  Recent Flowsheet Documentation  Taken 5/24/2024 1539 by Smita Davis, RN  Transportation Anticipated: family or friend will provide  Patient/Family Anticipated Services at Transition: none  Patient/Family Anticipates Transition to: home with family  Taken 5/24/2024 1537 by Smita Davis, RN  Equipment Currently Used at Home:   walker, rolling   oxygen     Problem: Asthma Comorbidity  Goal: Maintenance of Asthma Control  Outcome: Ongoing, Progressing     Problem: Behavioral Health Comorbidity  Goal: Maintenance of Behavioral Health Symptom Control  Outcome: Ongoing, Progressing     Problem: COPD (Chronic Obstructive Pulmonary Disease) Comorbidity  Goal: Maintenance of COPD Symptom Control  Outcome: Ongoing, Progressing     Problem: Diabetes Comorbidity  Goal: Blood Glucose Level Within Targeted Range  Outcome: Ongoing, Progressing     Problem: Heart Failure Comorbidity  Goal: Maintenance of Heart Failure Symptom Control  Outcome: Ongoing, Progressing     Problem: Hypertension Comorbidity  Goal: Blood Pressure in Desired Range  Outcome: Ongoing, Progressing     Problem: Obstructive Sleep Apnea Risk or Actual Comorbidity  Management  Goal: Unobstructed Breathing During Sleep  Outcome: Ongoing, Progressing     Problem: Osteoarthritis Comorbidity  Goal: Maintenance of Osteoarthritis Symptom Control  Outcome: Ongoing, Progressing     Problem: Pain Chronic (Persistent) (Comorbidity Management)  Goal: Acceptable Pain Control and Functional Ability  Outcome: Ongoing, Progressing     Problem: Seizure Disorder Comorbidity  Goal: Maintenance of Seizure Control  Outcome: Ongoing, Progressing     Problem: Skin Injury Risk Increased  Goal: Skin Health and Integrity  Outcome: Ongoing, Progressing   Goal Outcome Evaluation:

## 2024-05-24 NOTE — H&P
"    Patient Care Team:  Jonathan Luna APRN as PCP - General (Family Medicine)  Jak Gavin MD as Cardiologist (Cardiology)    Chief complaint Shortness of breath, weakness    Subjective     Patient is a 79 y.o. female with a h/o chronic HFpEF, atrial fibrillation, COPD, non-obstructive CAD, asthma, HTN, kidney transplant and lung cancer who presented to the ER with shortness of breath and weakness.  She was also noted to have bilateral lower extremity edema.  Patient was recently hospitalized for UTI and A fib with RVR.  UTI was treated with antibiotics.  A fib rate was controlled with Cardizem and she was discharged home with PO furosemide and PO iron supplementation.  She was discharged on 5/22/24, and she refused discharge to a SNF as recommended.  She also refused home health and outpatient PT.  Today, she stated she has increasing weakness and shortness of breath, especially when walking to and from the bathroom at home.  Her son said that she requires assistance around the clock at home and is overwhelmed with the level of care that she needs.  She reported multiple falls where she \"slides down to the floor\" because her legs won't hold her. She denied concussion, syncope, acute chest pain, urinary sxs, dizziness, headache.  At home, she reported episodes of chest pain but was unable to describe the pain.  She does not take nitroglycerin.  Last ischemic cardiac workup was in 2022, when she had NSTEMI and noted to have non-obstructive disease on heart cath.     She has been compliant with medications prescribed at discharge.  She and son confirm that her legs, while edematous, are significantly improved compared to prior to last hospitalization.        Review of Systems   Constitutional:  Negative for chills and fever.   Respiratory:  Positive for shortness of breath.    Cardiovascular:  Positive for palpitations and leg swelling. Negative for chest pain.        No chest pain currently. She has had episodes " of chest pain at home and does not take nitroglycerin.   Gastrointestinal:  Positive for diarrhea and nausea. Negative for vomiting.   Genitourinary:  Negative for dysuria, frequency and hematuria.   Musculoskeletal:  Positive for arthralgias and back pain.        Negative for muscle cramping.   Neurological:  Positive for weakness and light-headedness. Negative for dizziness, syncope and headaches.        Positive for near-syncope, especially when rising from toilet.   Hematological:  Bruises/bleeds easily.   Psychiatric/Behavioral:  Negative for agitation and confusion. The patient is not nervous/anxious.           History  Past Medical History:   Diagnosis Date    Allergic rhinitis 04/14/2015    Asthma 08/10/2017    Atrial flutter 05/11/2017    Chronic diarrhea 04/15/2019    Chronic hypoxemic respiratory failure 01/18/2024    Chronic pain 02/03/2015    COPD (chronic obstructive pulmonary disease)     COVID-19 virus detected 08/11/2022    Cytokine release syndrome, grade 1 08/16/2022    Edema of both lower extremities due to peripheral venous insufficiency 03/12/2021    ESRD on hemodialysis 05/22/2013    Essential (primary) hypertension 03/03/2022    Fracture of ulnar styloid 05/19/2022    Fracture of unspecified carpal bone, right wrist, subsequent encounter for fracture with routine healing 03/03/2022    Gastroesophageal reflux disease 11/12/2020    Gout 08/10/2017    Hearing loss 08/10/2017    History of appendectomy     History of DVT (deep vein thrombosis) 11/12/2020    History of kidney transplant 06/30/2017    History of lobectomy of lung 01/18/2024 03/08/2016:  RIGHT Lower Lobe Mass--> Right Video-assisted thoracoscopy with a moderate-to-large wedge resection of the RLL (by Dr. Haris Mcconnell @ [facility])--> Poorly differentiated carcinoma of the RLL.      History of repair of hip joint 05/21/2013    History of suicide attempt 05/20/2024    Hyperlipidemia 08/11/2014    Hypothyroidism, unspecified  03/03/2022    Infection due to extended spectrum beta lactamase (ESBL) producing bacteria 03/11/2016    No A2K system hx. +ESBL E coli urine on 3/11/16.      Irritable bowel syndrome 04/15/2019    Long term (current) use of immunosuppressive biologic 04/28/2021    Long term current use of anticoagulant therapy 01/18/2024    Malignant neoplasm of lung 08/10/2017    Menopausal flushing 08/30/2021    Mitral valve regurgitation 07/06/2015    Mixed anxiety and depressive disorder 04/30/2015    Non-small cell lung cancer 08/10/2017    Nonobstructive atherosclerosis of coronary artery 06/02/2019 [facility]20[facility]: LM:-nl;  LAD: diffuse, Ca++; 30%; CIRC: Dominant. Normal; RCA: small; 50% diffuse, proximal and mid-segment.      NSTEMI (non-ST elevated myocardial infarction) 08/11/2022    Obsessive-compulsive disorder 04/15/2019    Osteoarthritis 05/20/2024    Paroxysmal atrial fibrillation 05/11/2017    Paroxysmal supraventricular tachycardia 05/11/2017    Peripheral neuropathy 08/11/2014    Personal history of peptic ulcer disease 11/12/2020    Postoperative anemia due to acute blood loss 05/22/2013    Right wrist fracture 03/01/2022    Seasonal allergies 08/10/2017    Secondary hyperparathyroidism of renal origin 09/14/2015    Tricuspid valve regurgitation 03/15/2017    Ulcer of lower extremity 08/30/2021    Unspecified acute appendicitis 03/03/2022    Valvular heart disease 05/20/2024    Wegener's granulomatosis with renal involvement 03/03/2022     Past Surgical History:   Procedure Laterality Date    APPENDECTOMY      BREAST SURGERY Left     cysts rmeoved    CARDIAC CATHETERIZATION Right 08/12/2022    Procedure: Left Heart Cath and coronary angiogram;  Surgeon: Jak Gavin MD;  Location: [facility] CATH INVASIVE LOCATION;  Service: Cardiology;  Laterality: Right;    CLOSED REDUCTION WRIST FRACTURE Right 03/01/2022    Procedure: WRIST CLOSED REDUCTION;  Surgeon: Gaudencio Townsend MD;   Location: [facility] MAIN OR;  Service: Orthopedics;  Laterality: Right;    CYSTOSCOPY      HIP ARTHROPLASTY      HYSTERECTOMY      LUNG LOBECTOMY Right     TRANSPLANTATION RENAL       Family History   Problem Relation Age of Onset    No Known Problems Mother     No Known Problems Father      Social History     Tobacco Use    Smoking status: Former    Smokeless tobacco: Never   Vaping Use    Vaping status: Former   Substance Use Topics    Alcohol use: Never    Drug use: Never     (Not in a hospital admission)    Allergies:  Zolpidem     Objective     Vital Signs  Temp:  [97.8 °F (36.6 °C)] 97.8 °F (36.6 °C)  Heart Rate:  [63-87] 64  Resp:  [20] 20  BP: (108-134)/(53-69) 133/60     Physical Exam:      General Appearance:    Alert, cooperative, talking, in no acute distress while on oxygen   Head:    Normocephalic, without obvious abnormality, atraumatic   Eyes:            Lids and lashes normal, conjunctivae and sclerae normal, no   icterus, no pallor, corneas clear, PERRLA   Ears:    Ears appear intact with no abnormalities noted       Neck:   No adenopathy, supple, trachea midline, no thyromegaly, no JVD   Lungs:     Few scattered rhonchi, respirations regular, even and unlabored, on oxygen    Heart:    Irregular rhythm and normal rate, no murmur, no gallop, no rub, no click.  Right AV fistula present.  Thrill and bruit noted.       Abdomen:     No masses, no organomegaly, soft, rebound tenderness in umbilical region, non-distended, no guarding   Extremities:   Moves all extremities well, CVS changes bilateral lower ext   Pulses:   Pulses palpable and equal bilaterally   Skin:   No bleeding.  Bilateral bruising on arms       Neurologic:   She is alert and oriented to person, place, time; no focal deficits noted       Results Review:     Imaging Results (Last 24 Hours)       Procedure Component Value Units Date/Time    XR Chest 1 View [[id number]] Collected: 05/24/24 1236     Updated: 05/24/24 1240    Narrative:      XR  CHEST 1 VW    Date of Exam: 5/24/2024 12:15 PM EDT    Indication: SOA    Comparison: 5/22/2024    Findings:  Cardiomegaly. Pulmonary vasculature is mildly prominent. Lungs appear hyperinflated. Small bilateral pleural effusions, with fluid tracking in the right major fissure. Atelectasis in the left lung base      Impression:      Impression:    1. Cardiomegaly with pulmonary vascular congestion and small pleural effusions  2. Probable COPD with left basilar atelectasis      Electronically Signed: Ricardo Garcianes    5/24/2024 12:38 PM EDT    Workstation ID: [id number]             Lab Results (last 24 hours)       Procedure Component Value Units Date/Time    Blood Culture - Blood, Arm, Left [[id number]] Collected: 05/24/24 1311    Specimen: Blood from Arm, Left Updated: 05/24/24 1313    Blood Culture - Blood, Arm, Left [[id number]] Collected: 05/24/24 1251    Specimen: Blood from Arm, Left Updated: 05/24/24 1253    Comprehensive Metabolic Panel [[id number]]  (Abnormal) Collected: 05/24/24 1210    Specimen: Blood Updated: 05/24/24 1243     Glucose 154 mg/dL      BUN 21 mg/dL      Creatinine 1.16 mg/dL      Sodium 137 mmol/L      Potassium 4.0 mmol/L      Chloride 94 mmol/L      CO2 32.9 mmol/L      Calcium 10.1 mg/dL      Total Protein 6.1 g/dL      Albumin 3.6 g/dL      ALT (SGPT) 9 U/L      AST (SGOT) 16 U/L      Alkaline Phosphatase 59 U/L      Total Bilirubin 1.2 mg/dL      Globulin 2.5 gm/dL      A/G Ratio 1.4 g/dL      BUN/Creatinine Ratio 18.1     Anion Gap 10.1 mmol/L      eGFR 48.7 mL/min/1.73     Narrative:      GFR Normal >60  Chronic Kidney Disease <60  Kidney Failure <15    The GFR formula is only valid for adults with stable renal function between ages 18 and 70.    BNP [[id number]]  (Abnormal) Collected: 05/24/24 1210    Specimen: Blood Updated: 05/24/24 1237     proBNP 4,867.0 pg/mL     Narrative:      This assay is used as an aid in the diagnosis of individuals suspected of having heart failure. It can be  used as an aid in the diagnosis of acute decompensated heart failure (ADHF) in patients presenting with signs and symptoms of ADHF to the emergency department (ED). In addition, NT-proBNP of <300 pg/mL indicates ADHF is not likely.    Age Range Result Interpretation  NT-proBNP Concentration (pg/mL:      <50             Positive            >450                   Gray                 300-450                    Negative             <300    50-75           Positive            >900                  Gray                300-900                  Negative            <300      >75             Positive            >1800                  Gray                300-1800                  Negative            <300    High Sensitivity Troponin T [[id number]]  (Abnormal) Collected: 05/24/24 1210    Specimen: Blood Updated: 05/24/24 1237     HS Troponin T 43 ng/L     Narrative:      High Sensitive Troponin T Reference Range:  <14.0 ng/L- Negative Female for AMI  <22.0 ng/L- Negative Male for AMI  >=14 - Abnormal Female indicating possible myocardial injury.  >=22 - Abnormal Male indicating possible myocardial injury.   Clinicians would have to utilize clinical acumen, EKG, Troponin, and serial changes to determine if it is an Acute Myocardial Infarction or myocardial injury due to an underlying chronic condition.         Blood Gas, Arterial - [[id number]]  (Abnormal) Collected: 05/24/24 1225    Specimen: Arterial Blood Updated: 05/24/24 1231     Site Left Radial     Orion's Test Positive     pH, Arterial 7.442 pH units      pCO2, Arterial 51.4 mm Hg      pO2, Arterial 56.7 mm Hg      HCO3, Arterial 35.0 mmol/L      Base Excess, Arterial 9.6 mmol/L      Comment: Serial Number: [id number]:  [id number]        O2 Saturation, Arterial 89.5 %      CO2 Content 36.6 mmol/L      Barometric Pressure for Blood Gas --     Comment: N/A        Modality Cannula     FIO2 40 %      Hemodilution No    Protime-INR [[id number]]  (Abnormal) Collected: 05/24/24  "1210    Specimen: Blood Updated: 05/24/24 1227     Protime 12.5 Seconds      INR 1.16    aPTT [[id number]]  (Abnormal) Collected: 05/24/24 1210    Specimen: Blood Updated: 05/24/24 1227     PTT 30.9 seconds     D-dimer, Quantitative [[id number]]  (Abnormal) Collected: 05/24/24 1210    Specimen: Blood Updated: 05/24/24 1227     D-Dimer, Quantitative 1.60 mg/L (FEU)     Narrative:      According to the assay 's published package insert, a normal (<0.50 mg/L (FEU)) D-dimer result in conjunction with a non-high clinical probability assessment, excludes deep vein thrombosis (DVT) and pulmonary embolism (PE) with high sensitivity.    D-dimer values increase with age and this can make VTE exclusion of an older population difficult. To address this, the [employer], based on best available evidence and recent guidelines, recommends that clinicians use age-adjusted D-dimer thresholds in patients greater than 50 years of age with: a) a low probability of PE who do not meet all Pulmonary Embolism Rule Out Criteria, or b) in those with intermediate probability of PE.   The formula for an age-adjusted D-dimer cut-off is \"age/100\".  For example, a 60 year old patient would have an age-adjusted cut-off of 0.60 mg/L (FEU) and an 80 year old 0.80 mg/L (FEU).    CBC & Differential [[id number]]  (Abnormal) Collected: 05/24/24 1210    Specimen: Blood Updated: 05/24/24 1215    Narrative:      The following orders were created for panel order CBC & Differential.  Procedure                               Abnormality         Status                     ---------                               -----------         ------                     CBC Auto Differential[[id number]]        Abnormal            Final result                 Please view results for these tests on the individual orders.    CBC Auto Differential [[id number]]  (Abnormal) Collected: 05/24/24 1210    Specimen: Blood Updated: 05/24/24 1215     WBC 7.75 " 10*3/mm3      RBC 3.29 10*6/mm3      Hemoglobin 9.6 g/dL      Hematocrit 32.7 %      MCV 99.4 fL      MCH 29.2 pg      MCHC 29.4 g/dL      RDW 16.9 %      RDW-SD 62.1 fl      MPV 9.8 fL      Platelets 135 10*3/mm3      Neutrophil % 85.8 %      Lymphocyte % 4.6 %      Monocyte % 7.5 %      Eosinophil % 1.2 %      Basophil % 0.4 %      Immature Grans % 0.5 %      Neutrophils, Absolute 6.65 10*3/mm3      Lymphocytes, Absolute 0.36 10*3/mm3      Monocytes, Absolute 0.58 10*3/mm3      Eosinophils, Absolute 0.09 10*3/mm3      Basophils, Absolute 0.03 10*3/mm3      Immature Grans, Absolute 0.04 10*3/mm3      nRBC 0.0 /100 WBC     POC Lactate [[id number]]  (Abnormal) Collected: 05/24/24 1205    Specimen: Blood Updated: 05/24/24 1207     Lactate 2.4 mmol/L      Comment: Serial Number: [id number]:  [id number]                I reviewed the patient's new clinical results.    Assessment & Plan       Dyspnea on exertion - differential includes anginal equivalent, arrhythmia, pulmonary hypertension, general deconditioning.    - BNP = 4867;  BNP 7 days ago was 8040    - Last echo on 5/20/24 showed preserved ejection fraction (LVEF = 55%), severe tricuspid regurgitation, moderate mitral regurgitation   - D-Dimer = 1.60 - patient is anticoagulated and medication compliant  - PT/OT consulted   - cardiology consulted to advise on need for ischemic workup  Elevated troponin  - Initial troponin elevated at 43 and second measurement showed no further elevation (troponin: 43, 35)  - Pulmonary service consulted to consider starting treatment for pulmonary hypertension    Iron deficiency anemia  - Hgb = 9.6, stable  - On PO iron supplement (ferrous sulfate)    Chronic HFpEF - well compensated; decreasing Lasix dose; monitor volume status closely    H/o kidney transplant- consulted nephrology to follow with diuresis;  creatinine = 1.16;  her baseline is 0.76; will decrease Lasix dose slightly; avoid nephrotoxins; continue prednisone and  "Prograf for anti-rejection;  Asthma - stable  Atrial fibrillation - cardizem, apixaban; controlled  COPD - incentive spirometry ordered;  patient is chronically on 4 L of oxygen at home.  Currently, patient is on NC 6 L  Peripheral venous insufficiency  Pulmonary hypertension  HTN   GERD   H/o DVT  H/o kidney transplant  Hyperlipidemia  Hypothyroidism - levothyroxine;  last TSH on 5/18 was 0.645, free T4 level pending  H/o Non-small cell lung cancer - no recurrence noted on recent CT scan    Social - son is overwhelmed with her care at home and is adamant that she go to skilled rehab at discharge.  She refused rehab 2 days ago but is \"considering\" it now. PT, OT eval.    DVT prophylax- anticoagulated on Apixaban   GI prophylaxis- famotidine      I discussed the patient's findings and my recommendations with patient.     HENRRY Pop Student  05/24/24  14:03 EDT  I have interviewed and examined patient and provided 100% of the medical decision making.  Juanis Lowe MD    "

## 2024-05-25 LAB
ANION GAP SERPL CALCULATED.3IONS-SCNC: 8 MMOL/L (ref 5–15)
BACTERIA UR QL AUTO: ABNORMAL /HPF
BASOPHILS # BLD AUTO: 0.02 10*3/MM3 (ref 0–0.2)
BASOPHILS NFR BLD AUTO: 0.3 % (ref 0–1.5)
BILIRUB UR QL STRIP: NEGATIVE
BUN SERPL-MCNC: 24 MG/DL (ref 8–23)
BUN/CREAT SERPL: 20.7 (ref 7–25)
CALCIUM SPEC-SCNC: 9.7 MG/DL (ref 8.6–10.5)
CHLORIDE SERPL-SCNC: 97 MMOL/L (ref 98–107)
CLARITY UR: CLEAR
CO2 SERPL-SCNC: 36 MMOL/L (ref 22–29)
COLOR UR: ABNORMAL
CREAT SERPL-MCNC: 1.16 MG/DL (ref 0.57–1)
CREAT UR-MCNC: 135.7 MG/DL
D-LACTATE SERPL-SCNC: 1.7 MMOL/L (ref 0.5–2)
DEPRECATED RDW RBC AUTO: 61.9 FL (ref 37–54)
EGFRCR SERPLBLD CKD-EPI 2021: 48.7 ML/MIN/1.73
EOSINOPHIL # BLD AUTO: 0.12 10*3/MM3 (ref 0–0.4)
EOSINOPHIL NFR BLD AUTO: 2.1 % (ref 0.3–6.2)
ERYTHROCYTE [DISTWIDTH] IN BLOOD BY AUTOMATED COUNT: 17.1 % (ref 12.3–15.4)
GLUCOSE SERPL-MCNC: 104 MG/DL (ref 65–99)
GLUCOSE UR STRIP-MCNC: NEGATIVE MG/DL
HCT VFR BLD AUTO: 33.8 % (ref 34–46.6)
HGB BLD-MCNC: 9.6 G/DL (ref 12–15.9)
HGB UR QL STRIP.AUTO: NEGATIVE
HYALINE CASTS UR QL AUTO: ABNORMAL /LPF
IMM GRANULOCYTES # BLD AUTO: 0.03 10*3/MM3 (ref 0–0.05)
IMM GRANULOCYTES NFR BLD AUTO: 0.5 % (ref 0–0.5)
KETONES UR QL STRIP: ABNORMAL
LEUKOCYTE ESTERASE UR QL STRIP.AUTO: ABNORMAL
LYMPHOCYTES # BLD AUTO: 0.62 10*3/MM3 (ref 0.7–3.1)
LYMPHOCYTES NFR BLD AUTO: 10.8 % (ref 19.6–45.3)
MAGNESIUM SERPL-MCNC: 2.1 MG/DL (ref 1.6–2.4)
MCH RBC QN AUTO: 28.3 PG (ref 26.6–33)
MCHC RBC AUTO-ENTMCNC: 28.4 G/DL (ref 31.5–35.7)
MCV RBC AUTO: 99.7 FL (ref 79–97)
MONOCYTES # BLD AUTO: 0.61 10*3/MM3 (ref 0.1–0.9)
MONOCYTES NFR BLD AUTO: 10.6 % (ref 5–12)
NEUTROPHILS NFR BLD AUTO: 4.35 10*3/MM3 (ref 1.7–7)
NEUTROPHILS NFR BLD AUTO: 75.7 % (ref 42.7–76)
NITRITE UR QL STRIP: NEGATIVE
NRBC BLD AUTO-RTO: 0 /100 WBC (ref 0–0.2)
PH UR STRIP.AUTO: <=5 [PH] (ref 5–8)
PHOSPHATE SERPL-MCNC: 4.3 MG/DL (ref 2.5–4.5)
PLATELET # BLD AUTO: 143 10*3/MM3 (ref 140–450)
PMV BLD AUTO: 9.5 FL (ref 6–12)
POTASSIUM SERPL-SCNC: 4 MMOL/L (ref 3.5–5.2)
PROT ?TM UR-MCNC: 13.4 MG/DL
PROT UR QL STRIP: NEGATIVE
PROT/CREAT UR: 98.7 MG/G CREA (ref 0–200)
QT INTERVAL: 407 MS
QTC INTERVAL: 476 MS
RBC # BLD AUTO: 3.39 10*6/MM3 (ref 3.77–5.28)
RBC # UR STRIP: ABNORMAL /HPF
REF LAB TEST METHOD: ABNORMAL
SODIUM SERPL-SCNC: 141 MMOL/L (ref 136–145)
SP GR UR STRIP: 1.02 (ref 1–1.03)
SQUAMOUS #/AREA URNS HPF: ABNORMAL /HPF
TSH SERPL DL<=0.05 MIU/L-ACNC: 2.33 UIU/ML (ref 0.27–4.2)
UROBILINOGEN UR QL STRIP: ABNORMAL
WBC # UR STRIP: ABNORMAL /HPF
WBC NRBC COR # BLD AUTO: 5.75 10*3/MM3 (ref 3.4–10.8)
YEAST URNS QL MICRO: ABNORMAL /HPF

## 2024-05-25 PROCEDURE — 84443 ASSAY THYROID STIM HORMONE: CPT | Performed by: INTERNAL MEDICINE

## 2024-05-25 PROCEDURE — 81001 URINALYSIS AUTO W/SCOPE: CPT | Performed by: INTERNAL MEDICINE

## 2024-05-25 PROCEDURE — 84100 ASSAY OF PHOSPHORUS: CPT | Performed by: INTERNAL MEDICINE

## 2024-05-25 PROCEDURE — 80197 ASSAY OF TACROLIMUS: CPT | Performed by: INTERNAL MEDICINE

## 2024-05-25 PROCEDURE — 63710000001 TACROLIMUS PER 1 MG: Performed by: INTERNAL MEDICINE

## 2024-05-25 PROCEDURE — 85025 COMPLETE CBC W/AUTO DIFF WBC: CPT | Performed by: INTERNAL MEDICINE

## 2024-05-25 PROCEDURE — 83735 ASSAY OF MAGNESIUM: CPT | Performed by: INTERNAL MEDICINE

## 2024-05-25 PROCEDURE — 63710000001 PREDNISONE PER 5 MG: Performed by: INTERNAL MEDICINE

## 2024-05-25 PROCEDURE — 25010000002 CEFTRIAXONE PER 250 MG: Performed by: INTERNAL MEDICINE

## 2024-05-25 PROCEDURE — 80048 BASIC METABOLIC PNL TOTAL CA: CPT | Performed by: INTERNAL MEDICINE

## 2024-05-25 PROCEDURE — 83605 ASSAY OF LACTIC ACID: CPT | Performed by: INTERNAL MEDICINE

## 2024-05-25 PROCEDURE — 99222 1ST HOSP IP/OBS MODERATE 55: CPT | Performed by: STUDENT IN AN ORGANIZED HEALTH CARE EDUCATION/TRAINING PROGRAM

## 2024-05-25 PROCEDURE — 25010000002 ONDANSETRON PER 1 MG: Performed by: NURSE PRACTITIONER

## 2024-05-25 PROCEDURE — 84156 ASSAY OF PROTEIN URINE: CPT | Performed by: INTERNAL MEDICINE

## 2024-05-25 PROCEDURE — 82570 ASSAY OF URINE CREATININE: CPT | Performed by: INTERNAL MEDICINE

## 2024-05-25 RX ORDER — FUROSEMIDE 40 MG/1
80 TABLET ORAL
Status: DISCONTINUED | OUTPATIENT
Start: 2024-05-25 | End: 2024-05-30

## 2024-05-25 RX ORDER — NITROFURANTOIN 25; 75 MG/1; MG/1
100 CAPSULE ORAL EVERY 12 HOURS SCHEDULED
Status: DISCONTINUED | OUTPATIENT
Start: 2024-05-25 | End: 2024-05-25

## 2024-05-25 RX ADMIN — HYDROCODONE BITARTRATE AND ACETAMINOPHEN 1 TABLET: 5; 325 TABLET ORAL at 20:34

## 2024-05-25 RX ADMIN — FUROSEMIDE 80 MG: 40 TABLET ORAL at 08:24

## 2024-05-25 RX ADMIN — LEVOTHYROXINE SODIUM 50 MCG: 0.05 TABLET ORAL at 04:37

## 2024-05-25 RX ADMIN — PREDNISONE 5 MG: 5 TABLET ORAL at 08:24

## 2024-05-25 RX ADMIN — GUAIFENESIN 1200 MG: 600 TABLET, EXTENDED RELEASE ORAL at 08:24

## 2024-05-25 RX ADMIN — FAMOTIDINE 20 MG: 20 TABLET, FILM COATED ORAL at 08:24

## 2024-05-25 RX ADMIN — HYDROCODONE BITARTRATE AND ACETAMINOPHEN 1 TABLET: 5; 325 TABLET ORAL at 11:01

## 2024-05-25 RX ADMIN — FUROSEMIDE 80 MG: 40 TABLET ORAL at 11:01

## 2024-05-25 RX ADMIN — APIXABAN 5 MG: 5 TABLET, FILM COATED ORAL at 20:34

## 2024-05-25 RX ADMIN — Medication 10 ML: at 08:24

## 2024-05-25 RX ADMIN — TACROLIMUS 1 MG: 1 CAPSULE ORAL at 08:24

## 2024-05-25 RX ADMIN — TACROLIMUS 1 MG: 1 CAPSULE ORAL at 20:34

## 2024-05-25 RX ADMIN — CEFTRIAXONE SODIUM 2000 MG: 2 INJECTION, POWDER, FOR SOLUTION INTRAMUSCULAR; INTRAVENOUS at 15:33

## 2024-05-25 RX ADMIN — GUAIFENESIN 1200 MG: 600 TABLET, EXTENDED RELEASE ORAL at 20:34

## 2024-05-25 RX ADMIN — DILTIAZEM HYDROCHLORIDE 90 MG: 60 TABLET, FILM COATED ORAL at 20:34

## 2024-05-25 RX ADMIN — ONDANSETRON 4 MG: 2 INJECTION INTRAMUSCULAR; INTRAVENOUS at 11:01

## 2024-05-25 RX ADMIN — Medication 10 ML: at 20:36

## 2024-05-25 RX ADMIN — FERROUS SULFATE TAB EC 324 MG (65 MG FE EQUIVALENT) 324 MG: 324 (65 FE) TABLET DELAYED RESPONSE at 08:24

## 2024-05-25 RX ADMIN — METOPROLOL SUCCINATE 50 MG: 50 TABLET, EXTENDED RELEASE ORAL at 08:24

## 2024-05-25 RX ADMIN — DILTIAZEM HYDROCHLORIDE 90 MG: 60 TABLET, FILM COATED ORAL at 13:35

## 2024-05-25 RX ADMIN — FUROSEMIDE 80 MG: 40 TABLET ORAL at 16:07

## 2024-05-25 RX ADMIN — APIXABAN 5 MG: 5 TABLET, FILM COATED ORAL at 08:24

## 2024-05-25 RX ADMIN — DULOXETINE HYDROCHLORIDE 40 MG: 20 CAPSULE, DELAYED RELEASE ORAL at 08:25

## 2024-05-25 RX ADMIN — POTASSIUM CHLORIDE 40 MEQ: 1500 TABLET, EXTENDED RELEASE ORAL at 08:24

## 2024-05-25 RX ADMIN — CHOLESTYRAMINE 4 G: 4 POWDER, FOR SUSPENSION ORAL at 08:25

## 2024-05-25 NOTE — PLAN OF CARE
Problem: Adult Inpatient Plan of Care  Goal: Plan of Care Review  Outcome: Ongoing, Progressing  Flowsheets (Taken 5/25/2024 0242)  Progress: improving  Plan of Care Reviewed With: patient  Goal: Patient-Specific Goal (Individualized)  Outcome: Ongoing, Progressing  Goal: Absence of Hospital-Acquired Illness or Injury  Outcome: Ongoing, Progressing  Intervention: Identify and Manage Fall Risk  Recent Flowsheet Documentation  Taken 5/25/2024 0201 by Alicja Hodge RN  Safety Promotion/Fall Prevention:   clutter free environment maintained   assistive device/personal items within reach   nonskid shoes/slippers when out of bed   room organization consistent   safety round/check completed   fall prevention program maintained  Taken 5/25/2024 0020 by Alicja Hodge RN  Safety Promotion/Fall Prevention:   assistive device/personal items within reach   clutter free environment maintained   fall prevention program maintained   nonskid shoes/slippers when out of bed   room organization consistent   safety round/check completed  Taken 5/24/2024 2202 by Alicja Hodge RN  Safety Promotion/Fall Prevention:   assistive device/personal items within reach   clutter free environment maintained   nonskid shoes/slippers when out of bed   room organization consistent   safety round/check completed   fall prevention program maintained  Taken 5/24/2024 2009 by Alicja Hodge, RN  Safety Promotion/Fall Prevention:   assistive device/personal items within reach   clutter free environment maintained   fall prevention program maintained   nonskid shoes/slippers when out of bed   room organization consistent   safety round/check completed  Intervention: Prevent Skin Injury  Recent Flowsheet Documentation  Taken 5/24/2024 2009 by Alicja Hodge, RN  Body Position:   position changed independently   supine  Intervention: Prevent and Manage VTE (Venous Thromboembolism) Risk  Recent Flowsheet Documentation  Taken 5/24/2024 2009 by Naveen  GHASSAN Helm  Activity Management: activity encouraged  VTE Prevention/Management: sequential compression devices off  Range of Motion: active ROM (range of motion) encouraged  Intervention: Prevent Infection  Recent Flowsheet Documentation  Taken 5/25/2024 0201 by Alicja Hodge RN  Infection Prevention:   environmental surveillance performed   hand hygiene promoted   personal protective equipment utilized   rest/sleep promoted   single patient room provided  Taken 5/25/2024 0020 by Alicja Hodge RN  Infection Prevention:   environmental surveillance performed   hand hygiene promoted   personal protective equipment utilized   rest/sleep promoted   single patient room provided  Taken 5/24/2024 2202 by Alicja Hodge RN  Infection Prevention:   environmental surveillance performed   hand hygiene promoted   personal protective equipment utilized   single patient room provided  Taken 5/24/2024 2009 by Alicja Hodge RN  Infection Prevention:   environmental surveillance performed   hand hygiene promoted   single patient room provided   personal protective equipment utilized  Goal: Optimal Comfort and Wellbeing  Outcome: Ongoing, Progressing  Intervention: Provide Person-Centered Care  Recent Flowsheet Documentation  Taken 5/24/2024 2009 by Alicja Hodge RN  Trust Relationship/Rapport:   care explained   choices provided  Goal: Readiness for Transition of Care  Outcome: Ongoing, Progressing  Intervention: Mutually Develop Transition Plan  Recent Flowsheet Documentation  Taken 5/25/2024 0027 by Alicja Hodge RN  Equipment Needed After Discharge: none  Equipment Currently Used at Home:   walker, rolling   oxygen  Anticipated Changes Related to Illness: inability to care for self  Concerns Comments: lives alone  Transportation Anticipated: family or friend will provide  Transportation Concerns: none  Concerns to be Addressed:   mental health   care coordination/care conferences  Readmission Within the Last 30 Days:  current reason for admission unrelated to previous admission  Patient/Family Anticipated Services at Transition: home health care  Patient/Family Anticipates Transition to: home     Problem: Asthma Comorbidity  Goal: Maintenance of Asthma Control  Outcome: Ongoing, Progressing  Intervention: Maintain Asthma Symptom Control  Recent Flowsheet Documentation  Taken 5/24/2024 2202 by Alicja Hodge RN  Medication Review/Management:   medications reviewed   high-risk medications identified  Taken 5/24/2024 2009 by Alicja Hodge RN  Medication Review/Management:   medications reviewed   high-risk medications identified     Problem: Behavioral Health Comorbidity  Goal: Maintenance of Behavioral Health Symptom Control  Outcome: Ongoing, Progressing  Intervention: Maintain Behavioral Health Symptom Control  Recent Flowsheet Documentation  Taken 5/24/2024 2202 by Alicja Hodge RN  Medication Review/Management:   medications reviewed   high-risk medications identified  Taken 5/24/2024 2009 by Alicja Hodge RN  Medication Review/Management:   medications reviewed   high-risk medications identified     Problem: COPD (Chronic Obstructive Pulmonary Disease) Comorbidity  Goal: Maintenance of COPD Symptom Control  Outcome: Ongoing, Progressing  Intervention: Maintain COPD-Symptom Control  Recent Flowsheet Documentation  Taken 5/24/2024 2202 by Alicja Hodge RN  Medication Review/Management:   medications reviewed   high-risk medications identified  Taken 5/24/2024 2009 by Alicja Hodge RN  Supportive Measures: active listening utilized  Medication Review/Management:   medications reviewed   high-risk medications identified     Problem: Diabetes Comorbidity  Goal: Blood Glucose Level Within Targeted Range  Outcome: Ongoing, Progressing     Problem: Heart Failure Comorbidity  Goal: Maintenance of Heart Failure Symptom Control  Outcome: Ongoing, Progressing  Intervention: Maintain Heart Failure-Management  Recent Flowsheet  Documentation  Taken 5/24/2024 2202 by Alicja Hodge RN  Medication Review/Management:   medications reviewed   high-risk medications identified  Taken 5/24/2024 2009 by Alicja Hodge RN  Medication Review/Management:   medications reviewed   high-risk medications identified     Problem: Hypertension Comorbidity  Goal: Blood Pressure in Desired Range  Outcome: Ongoing, Progressing  Intervention: Maintain Blood Pressure Management  Recent Flowsheet Documentation  Taken 5/24/2024 2202 by Alicja Hodge RN  Medication Review/Management:   medications reviewed   high-risk medications identified  Taken 5/24/2024 2009 by Alicja Hodge RN  Syncope Management: position changed slowly  Medication Review/Management:   medications reviewed   high-risk medications identified     Problem: Obstructive Sleep Apnea Risk or Actual Comorbidity Management  Goal: Unobstructed Breathing During Sleep  Outcome: Ongoing, Progressing     Problem: Osteoarthritis Comorbidity  Goal: Maintenance of Osteoarthritis Symptom Control  Outcome: Ongoing, Progressing  Intervention: Maintain Osteoarthritis Symptom Control  Recent Flowsheet Documentation  Taken 5/24/2024 2202 by Alicja Hodge RN  Medication Review/Management:   medications reviewed   high-risk medications identified  Taken 5/24/2024 2009 by Alicja Hodge RN  Activity Management: activity encouraged  Assistive Device Utilized:   walker   gait belt  Medication Review/Management:   medications reviewed   high-risk medications identified     Problem: Pain Chronic (Persistent) (Comorbidity Management)  Goal: Acceptable Pain Control and Functional Ability  Outcome: Ongoing, Progressing  Intervention: Manage Persistent Pain  Recent Flowsheet Documentation  Taken 5/24/2024 2202 by Alicja Hodge RN  Medication Review/Management:   medications reviewed   high-risk medications identified  Taken 5/24/2024 2009 by Alicja Hodge RN  Bowel Elimination Promotion: adequate fluid intake  promoted  Sleep/Rest Enhancement:   room darkened   relaxation techniques promoted   awakenings minimized  Medication Review/Management:   medications reviewed   high-risk medications identified  Intervention: Optimize Psychosocial Wellbeing  Recent Flowsheet Documentation  Taken 5/24/2024 2009 by Alicja Hodge RN  Supportive Measures: active listening utilized  Family/Support System Care:   support provided   self-care encouraged     Problem: Seizure Disorder Comorbidity  Goal: Maintenance of Seizure Control  Outcome: Ongoing, Progressing  Intervention: Maintain Seizure-Symptom Control  Recent Flowsheet Documentation  Taken 5/24/2024 2009 by Alicja Hodge RN  Seizure Precautions: clutter-free environment maintained     Problem: Skin Injury Risk Increased  Goal: Skin Health and Integrity  Outcome: Ongoing, Progressing  Intervention: Optimize Skin Protection  Recent Flowsheet Documentation  Taken 5/24/2024 2009 by Alicja Hodge RN  Pressure Reduction Techniques: frequent weight shift encouraged  Head of Bed (HOB) Positioning: HOB at 20-30 degrees  Pressure Reduction Devices: specialty bed utilized   Goal Outcome Evaluation:  Plan of Care Reviewed With: patient        Progress: improving     Alert and oriented x 4. Able to verbalize needs and wants. Takes medication whole and tolerates well. C/O pain, PRN Norco administered with positive effect noted. C/O anxiousness, PRN Valium administered with positive effect noted. Continues to require O2 therapy at 4 LPM via NC and tolerating well. 3 LPM via NC is patient's baseline. Continues to be followed by cardiology, nephrology and pulmonology. Compliant with 1200 ml/day fluid restriction. Blood cultures drawn on 5/24/24. External catheter in place for bladder elimination. Remains on contact precautions for ESBL in urine. Currently in bed, eyes closed. Rise and fall of chest observed. Call bell in reach.

## 2024-05-25 NOTE — PLAN OF CARE
Problem: Adult Inpatient Plan of Care  Goal: Plan of Care Review  Outcome: Ongoing, Progressing  Flowsheets (Taken 5/25/2024 1141)  Plan of Care Reviewed With: patient  Goal: Patient-Specific Goal (Individualized)  Outcome: Ongoing, Progressing  Goal: Absence of Hospital-Acquired Illness or Injury  Outcome: Ongoing, Progressing  Intervention: Identify and Manage Fall Risk  Recent Flowsheet Documentation  Taken 5/25/2024 0952 by Genevieve Strange RN  Safety Promotion/Fall Prevention:   activity supervised   assistive device/personal items within reach   clutter free environment maintained   fall prevention program maintained   nonskid shoes/slippers when out of bed   room organization consistent   safety round/check completed  Taken 5/25/2024 0834 by Genevieve Strange RN  Safety Promotion/Fall Prevention:   activity supervised   assistive device/personal items within reach   clutter free environment maintained   fall prevention program maintained   nonskid shoes/slippers when out of bed   room organization consistent   safety round/check completed  Intervention: Prevent Skin Injury  Recent Flowsheet Documentation  Taken 5/25/2024 0952 by Genevieve Strange RN  Body Position:   position changed independently   supine   weight shifting  Taken 5/25/2024 0834 by Genevieve Strange RN  Body Position:   position changed independently   supine   weight shifting  Intervention: Prevent and Manage VTE (Venous Thromboembolism) Risk  Recent Flowsheet Documentation  Taken 5/25/2024 0952 by Genevieve Strange RN  Activity Management: activity encouraged  Taken 5/25/2024 0834 by Genevieve Strange RN  Activity Management: activity encouraged  Range of Motion: active ROM (range of motion) encouraged  Intervention: Prevent Infection  Recent Flowsheet Documentation  Taken 5/25/2024 0952 by Genevieve Strange RN  Infection Prevention: hand hygiene promoted  Taken 5/25/2024 0834 by Genevieve Strange RN  Infection Prevention: hand hygiene promoted  Goal:  Optimal Comfort and Wellbeing  Outcome: Ongoing, Progressing  Intervention: Monitor Pain and Promote Comfort  Recent Flowsheet Documentation  Taken 5/25/2024 1125 by Genevieve Strange RN  Pain Management Interventions: see MAR  Taken 5/25/2024 1100 by Genevieve Strange RN  Pain Management Interventions:   pillow support provided   position adjusted  Intervention: Provide Person-Centered Care  Recent Flowsheet Documentation  Taken 5/25/2024 0834 by Genevieve Strange RN  Trust Relationship/Rapport:   care explained   choices provided   empathic listening provided   emotional support provided   questions answered   questions encouraged   reassurance provided   thoughts/feelings acknowledged  Goal: Readiness for Transition of Care  Outcome: Ongoing, Progressing     Problem: Asthma Comorbidity  Goal: Maintenance of Asthma Control  Outcome: Ongoing, Progressing  Intervention: Maintain Asthma Symptom Control  Recent Flowsheet Documentation  Taken 5/25/2024 0952 by Genevieve Strange RN  Medication Review/Management: medications reviewed  Taken 5/25/2024 0834 by Genevieve Strange RN  Medication Review/Management: medications reviewed     Problem: Behavioral Health Comorbidity  Goal: Maintenance of Behavioral Health Symptom Control  Outcome: Ongoing, Progressing  Intervention: Maintain Behavioral Health Symptom Control  Recent Flowsheet Documentation  Taken 5/25/2024 0952 by Genevieve Strange RN  Medication Review/Management: medications reviewed  Taken 5/25/2024 0834 by Genevieve Strange RN  Medication Review/Management: medications reviewed     Problem: COPD (Chronic Obstructive Pulmonary Disease) Comorbidity  Goal: Maintenance of COPD Symptom Control  Outcome: Ongoing, Progressing  Intervention: Maintain COPD-Symptom Control  Recent Flowsheet Documentation  Taken 5/25/2024 0952 by Genevieve Strange RN  Medication Review/Management: medications reviewed  Taken 5/25/2024 0834 by Genevieve Strange RN  Supportive Measures:   active listening  utilized   verbalization of feelings encouraged  Medication Review/Management: medications reviewed     Problem: Diabetes Comorbidity  Goal: Blood Glucose Level Within Targeted Range  Outcome: Ongoing, Progressing     Problem: Heart Failure Comorbidity  Goal: Maintenance of Heart Failure Symptom Control  Outcome: Ongoing, Progressing  Intervention: Maintain Heart Failure-Management  Recent Flowsheet Documentation  Taken 5/25/2024 0952 by Genevieve Strange RN  Medication Review/Management: medications reviewed  Taken 5/25/2024 0834 by Genevieve Strange RN  Medication Review/Management: medications reviewed     Problem: Hypertension Comorbidity  Goal: Blood Pressure in Desired Range  Outcome: Ongoing, Progressing  Intervention: Maintain Blood Pressure Management  Recent Flowsheet Documentation  Taken 5/25/2024 0952 by Genevieve Strange RN  Medication Review/Management: medications reviewed  Taken 5/25/2024 0834 by Genevieve Strange RN  Medication Review/Management: medications reviewed     Problem: Obstructive Sleep Apnea Risk or Actual Comorbidity Management  Goal: Unobstructed Breathing During Sleep  Outcome: Ongoing, Progressing     Problem: Osteoarthritis Comorbidity  Goal: Maintenance of Osteoarthritis Symptom Control  Outcome: Ongoing, Progressing  Intervention: Maintain Osteoarthritis Symptom Control  Recent Flowsheet Documentation  Taken 5/25/2024 0952 by Genevieve Strange RN  Activity Management: activity encouraged  Medication Review/Management: medications reviewed  Taken 5/25/2024 0834 by Genevieve Strange RN  Activity Management: activity encouraged  Medication Review/Management: medications reviewed     Problem: Pain Chronic (Persistent) (Comorbidity Management)  Goal: Acceptable Pain Control and Functional Ability  Outcome: Ongoing, Progressing  Intervention: Manage Persistent Pain  Recent Flowsheet Documentation  Taken 5/25/2024 0952 by Genevieve Strange RN  Medication Review/Management: medications reviewed  Taken  5/25/2024 0834 by Genevieve Strange RN  Medication Review/Management: medications reviewed  Intervention: Develop Pain Management Plan  Recent Flowsheet Documentation  Taken 5/25/2024 1125 by Genevieve Strange RN  Pain Management Interventions: see MAR  Taken 5/25/2024 1100 by Genevieve Strange RN  Pain Management Interventions:   pillow support provided   position adjusted  Intervention: Optimize Psychosocial Wellbeing  Recent Flowsheet Documentation  Taken 5/25/2024 0834 by Genevieve Strange RN  Supportive Measures:   active listening utilized   verbalization of feelings encouraged  Diversional Activities: television  Family/Support System Care:   self-care encouraged   support provided     Problem: Seizure Disorder Comorbidity  Goal: Maintenance of Seizure Control  Outcome: Ongoing, Progressing  Intervention: Maintain Seizure-Symptom Control  Recent Flowsheet Documentation  Taken 5/25/2024 0834 by Genevieve Strange RN  Seizure Precautions: clutter-free environment maintained     Problem: Skin Injury Risk Increased  Goal: Skin Health and Integrity  Outcome: Ongoing, Progressing  Intervention: Optimize Skin Protection  Recent Flowsheet Documentation  Taken 5/25/2024 0952 by Genevieve Strange RN  Head of Bed (HOB) Positioning: HOB elevated  Taken 5/25/2024 0834 by Genevieve Strange RN  Pressure Reduction Techniques:   frequent weight shift encouraged   weight shift assistance provided  Head of Bed (HOB) Positioning: HOB elevated  Pressure Reduction Devices: pressure-redistributing mattress utilized   Goal Outcome Evaluation:  Plan of Care Reviewed With: patient         Pt a/ox4, assist x 1-2 when moving to [facility], external cath, continues to turn and reposition q 2 hours and as needed, remains high falls precautions, remains in contact precautions, clean and drt, call light in reach, reviewed plan of care with patient,      Daily Care Plan Summary: Heart Failure    Diuretic in use (IV or PO):   PO        Daily weight (up or down):     loss: 0.5lbs      Output > Intake (yes/no):Yes      O2 Requirements (current, any change?):  4 liters/min via nasal cannula      Symptoms noted with Activity (Respiratory Tolerance, functional state):     sob      Anticipated Discharge Plans:     home with family?

## 2024-05-25 NOTE — PROGRESS NOTES
LOS: 1 day   Patient Care Team:  Jonathan Luna APRN as PCP - General (Family Medicine)  Jak Gavin MD as Cardiologist (Cardiology)    Subjective:  Some improvement noted//less shortness of breath    Objective:   Afebrile      Review of Systems:   Review of Systems   Respiratory:  Positive for shortness of breath.    Cardiovascular:  Positive for leg swelling.   Neurological:  Positive for weakness.           Vital Signs  Temp:  [97 °F (36.1 °C)-98 °F (36.7 °C)] 97.5 °F (36.4 °C)  Heart Rate:  [56-87] 56  Resp:  [16-24] 20  BP: (105-134)/(51-69) 123/58    Physical Exam:  Physical Exam  Vitals and nursing note reviewed.   Constitutional:       Appearance: Normal appearance.   Cardiovascular:      Rate and Rhythm: Rhythm irregular.      Heart sounds: Normal heart sounds.   Pulmonary:      Breath sounds: Normal breath sounds.   Skin:     General: Skin is warm.   Neurological:      Mental Status: She is alert.          Radiology:  XR Chest 1 View    Result Date: 5/24/2024  Impression: 1. Cardiomegaly with pulmonary vascular congestion and small pleural effusions 2. Probable COPD with left basilar atelectasis Electronically Signed: Ricardo Chavarria  5/24/2024 12:38 PM EDT  Workstation ID: [id number]    XR Chest 1 View    Result Date: 5/22/2024  Impression: 1.Coarse bilateral reticular markings, which could reflect interstitial pulmonary edema or atypical pneumonia. 2.Cardiomegaly with small right pleural effusion. Electronically Signed: Naun Bills MD  5/22/2024 8:10 AM EDT  Workstation ID: [id number]    CT Abdomen Pelvis Stone Protocol    Result Date: 5/21/2024  Impression: 1. No acute abnormality in the abdomen or pelvis. 2. Transplant kidney in the right lower quadrant without hydronephrosis or obstructive uropathy. Native kidneys are severely atrophic with bilateral nonobstructing nephrolithiasis. 3. Cholelithiasis. 4. Cardiomegaly with basilar interstitial pulmonary edema and small bilateral pleural  effusions. 5. Additional incidental findings above. Electronically Signed: Franck Cortez MD  5/21/2024 3:47 PM EDT  Workstation ID: [id number]    CT Chest Without Contrast Diagnostic    Result Date: 5/19/2024  Minimal interval enlargement of the tissue surrounding the postsurgical changes associated with small bilateral pleural effusions. Enlargement of the main pulmonary artery suggesting pulmonary arterial hypertension. Electronically Signed: Adalid Silverio MD  5/19/2024 7:58 PM EDT  Workstation ID: [id number]    XR Chest 1 View    Result Date: 5/17/2024  Small bilateral pleural effusions with vascular congestion. Electronically Signed: Adalid Silverio MD  5/17/2024 10:50 PM EDT  Workstation ID: [id number]    CT Head Without Contrast    Result Date: 5/17/2024  No acute intracranial abnormality. Electronically Signed: Davey Wells MD  5/17/2024 10:45 PM EDT  Workstation ID: [id number]    CT Cervical Spine Without Contrast    Result Date: 5/17/2024  Impression: No evidence of fracture. Degenerative changes of the cervical spine, greatest at C5-6 and C6-7 as above. Electronically Signed: Spencer No MD  5/17/2024 10:44 PM EDT  Workstation ID: [id number]        Results Review:     I reviewed the patient's new clinical results.  I reviewed the patient's new imaging results and agree with the interpretation.    Medication Review:   Scheduled Meds:apixaban, 5 mg, Oral, Q12H  cholestyramine light, 1 packet, Oral, Daily  dilTIAZem, 90 mg, Oral, Q8H  DULoxetine, 40 mg, Oral, Daily  famotidine, 20 mg, Oral, Daily  ferrous sulfate, 324 mg, Oral, Daily With Breakfast  furosemide, 80 mg, Oral, Daily  guaiFENesin, 1,200 mg, Oral, BID  levothyroxine, 50 mcg, Oral, Q AM  metoprolol succinate XL, 50 mg, Oral, Daily  potassium chloride, 40 mEq, Oral, Daily  predniSONE, 5 mg, Oral, Daily With Breakfast  sodium chloride, 10 mL, Intravenous, Q12H  tacrolimus, 1 mg, Oral, BID      Continuous Infusions:   PRN Meds:.  acetaminophen    Calcium  "Replacement - Follow Nurse / BPA Driven Protocol    carboxymethylcellulose sod    diazePAM    Diclofenac Sodium    hydrALAZINE    HYDROcodone-acetaminophen    loperamide    Magnesium Standard Dose Replacement - Follow Nurse / BPA Driven Protocol    nitroglycerin    ondansetron ODT **OR** ondansetron    Phosphorus Replacement - Follow Nurse / BPA Driven Protocol    Potassium Replacement - Follow Nurse / BPA Driven Protocol    [COMPLETED] Insert Peripheral IV **AND** sodium chloride    sodium chloride    sodium chloride    Labs:    CBC    Results from last 7 days   Lab Units 05/25/24  0443 05/24/24  1210 05/22/24  0305 05/21/24  0145 05/20/24  0337 05/19/24  0521   WBC 10*3/mm3 5.75 7.75 6.64 6.64 6.18 6.70   HEMOGLOBIN g/dL 9.6* 9.6* 10.0* 10.0* 9.2* 9.9*   PLATELETS 10*3/mm3 143 135* 131* 121* 120* 128*     BMP   Results from last 7 days   Lab Units 05/25/24  0443 05/24/24  1408 05/24/24  1210 05/22/24  0305 05/21/24  1603 05/21/24  0145 05/20/24  0337 05/19/24  0521   SODIUM mmol/L 141  --  137 142  --  142 142 140   POTASSIUM mmol/L 4.0  --  4.0 3.6 4.4 3.4* 3.8 4.0   CHLORIDE mmol/L 97*  --  94* 99  --  101 104 104   CO2 mmol/L 36.0*  --  32.9* 35.0*  --  32.0* 28.8 29.0   BUN mg/dL 24*  --  21 16  --  16 15 20   CREATININE mg/dL 1.16*  --  1.16* 0.76  --  0.88 0.74 0.75   GLUCOSE mg/dL 104*  --  154* 101*  --  111* 113* 117*   MAGNESIUM mg/dL 2.1 1.8  --  1.5*  --   --   --   --    PHOSPHORUS mg/dL 4.3  --   --   --   --   --   --   --      Cr Clearance Estimated Creatinine Clearance: 44.6 mL/min (A) (by C-G formula based on SCr of 1.16 mg/dL (H)).  Coag   Results from last 7 days   Lab Units 05/24/24  1210   INR  1.16*   APTT seconds 30.9*     HbA1C No results found for: \"HGBA1C\"  Blood Glucose   Glucose   Date/Time Value Ref Range Status   05/22/2024 1204 156 (H) 70 - 105 mg/dL Final     Comment:     Serial Number: [id number]:  [id number]     Infection     CMP   Results from last 7 days   Lab Units " 05/25/24  0443 05/24/24  1210 05/22/24  0305 05/21/24  1603 05/21/24  0145 05/20/24  0337 05/19/24  0521   SODIUM mmol/L 141 137 142  --  142 142 140   POTASSIUM mmol/L 4.0 4.0 3.6 4.4 3.4* 3.8 4.0   CHLORIDE mmol/L 97* 94* 99  --  101 104 104   CO2 mmol/L 36.0* 32.9* 35.0*  --  32.0* 28.8 29.0   BUN mg/dL 24* 21 16  --  16 15 20   CREATININE mg/dL 1.16* 1.16* 0.76  --  0.88 0.74 0.75   GLUCOSE mg/dL 104* 154* 101*  --  111* 113* 117*   ALBUMIN g/dL  --  3.6 3.8  --  3.9 3.3* 3.5   BILIRUBIN mg/dL  --  1.2 0.9  --  0.8 0.6 0.9   ALK PHOS U/L  --  59 58  --  56 52 51   AST (SGOT) U/L  --  16 13  --  18 16 16   ALT (SGPT) U/L  --  9 8  --  10 9 9     UA      Radiology(recent) XR Chest 1 View    Result Date: 5/24/2024  Impression: 1. Cardiomegaly with pulmonary vascular congestion and small pleural effusions 2. Probable COPD with left basilar atelectasis Electronically Signed: Ricardo Chavarria  5/24/2024 12:38 PM EDT  Workstation ID: [id number]    Assessment:  SOB  Acute exacerbation of diastolic congestive heart failure  ESRD  Hypertension with ESRD  ALISE  Anemia with ESRD  Paroxysmal atrial fibrillation  Hypercoagulable state secondary to atrial fibrillation  Chronic hypoxic respiratory failure  Pulmonary hypertension  Panlobular emphysema  Chronic mucopurulent bronchitis  COPD asthma overlap syndrome  Venous hypertension  Supplemental oxygen dependency  Gastroesophageal reflux disease without esophagitis  History of kidney transplant  Acquired hypothyroidism  History of non-small cell lung cancer  History of lobectomy of lung right lower lobe  Peripheral polyneuropathy  History of peptic ulcer disease  Seasonal allergic rhinitis  Secondary hyperparathyroidism of renal origin  Secondary hyperaldosteronism  Wegener's granulomatosis with renal involvement  Valvular heart disease  OCD  Osteoarthritis  Obsessive-compulsive disorder  History of non-ST segment elevation myocardial infarction  Long-term anticoagulant therapy  usage  Long-term use of immunosuppressive biologic  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

## 2024-05-25 NOTE — PROGRESS NOTES
"PULMONARY CRITICAL CARE PROGRESS  NOTE      PATIENT IDENTIFICATION:  Name: Jaja Carcamo  MRN: [id number]  :  1945     Age: 79 y.o.  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      Objective:  tMax 24 hrs: Temp (24hrs), Av.5 °F (36.4 °C), Min:97 °F (36.1 °C), Max:98 °F (36.7 °C)      Vitals Ranges:   Temp:  [97 °F (36.1 °C)-98 °F (36.7 °C)] 97.5 °F (36.4 °C)  Heart Rate:  [56-87] 56  Resp:  [16-24] 20  BP: (105-134)/(51-69) 123/58    Intake and Output Last 3 Shifts:   I/O last 3 completed shifts:  In: 480 [P.O.:480]  Out: 200 [Urine:200]    Exam:  /58 (BP Location: Left arm, Patient Position: Lying)   Pulse 56   Temp 97.5 °F (36.4 °C) (Oral)   Resp 20   Ht 167.6 cm (66\")   Wt 85.1 kg (187 lb 9.8 oz)   SpO2 97%   BMI 30.28 kg/m²     General Appearance: Alert awake not in distress  HEENT:  Normocephalic, without obvious abnormality. Conjunctivae/corneas clear.  Normal external ear canals. Nares normal, no drainage     Neck:  Supple, symmetrical, trachea midline. No JVD.  Lungs /Chest wall:   Bilateral basal rhonchi, respirations unlabored, symmetrical wall movement.     Heart:  Regular rate and rhythm, systolic murmur PMI left sternal border  Abdomen: Soft, nontender, no masses, no organomegaly.    Extremities: Trace edema, no clubbing or cyanosis        Medications:  apixaban, 5 mg, Oral, Q12H  cholestyramine light, 1 packet, Oral, Daily  dilTIAZem, 90 mg, Oral, Q8H  DULoxetine, 40 mg, Oral, Daily  famotidine, 20 mg, Oral, Daily  ferrous sulfate, 324 mg, Oral, Daily With Breakfast  furosemide, 80 mg, Oral, Daily  guaiFENesin, 1,200 mg, Oral, BID  levothyroxine, 50 mcg, " Oral, Q AM  metoprolol succinate XL, 50 mg, Oral, Daily  potassium chloride, 40 mEq, Oral, Daily  predniSONE, 5 mg, Oral, Daily With Breakfast  sodium chloride, 10 mL, Intravenous, Q12H  tacrolimus, 1 mg, Oral, BID        Infusion:          PRN:    acetaminophen    Calcium Replacement - Follow Nurse / BPA Driven Protocol    carboxymethylcellulose sod    diazePAM    Diclofenac Sodium    hydrALAZINE    HYDROcodone-acetaminophen    loperamide    Magnesium Standard Dose Replacement - Follow Nurse / BPA Driven Protocol    nitroglycerin    ondansetron ODT **OR** ondansetron    Phosphorus Replacement - Follow Nurse / BPA Driven Protocol    Potassium Replacement - Follow Nurse / BPA Driven Protocol    [COMPLETED] Insert Peripheral IV **AND** sodium chloride    sodium chloride    sodium chloride  Data Review:  All labs (24hrs):   Recent Results (from the past 24 hour(s))   ECG 12 Lead Dyspnea    Collection Time: 05/24/24 11:36 AM   Result Value Ref Range    QT Interval 407 ms    QTC Interval 476 ms   POC Lactate    Collection Time: 05/24/24 12:05 PM    Specimen: Blood   Result Value Ref Range    Lactate 2.4 (C) 0.3 - 2.0 mmol/L   Comprehensive Metabolic Panel    Collection Time: 05/24/24 12:10 PM    Specimen: Blood   Result Value Ref Range    Glucose 154 (H) 65 - 99 mg/dL    BUN 21 8 - 23 mg/dL    Creatinine 1.16 (H) 0.57 - 1.00 mg/dL    Sodium 137 136 - 145 mmol/L    Potassium 4.0 3.5 - 5.2 mmol/L    Chloride 94 (L) 98 - 107 mmol/L    CO2 32.9 (H) 22.0 - 29.0 mmol/L    Calcium 10.1 8.6 - 10.5 mg/dL    Total Protein 6.1 6.0 - 8.5 g/dL    Albumin 3.6 3.5 - 5.2 g/dL    ALT (SGPT) 9 1 - 33 U/L    AST (SGOT) 16 1 - 32 U/L    Alkaline Phosphatase 59 39 - 117 U/L    Total Bilirubin 1.2 0.0 - 1.2 mg/dL    Globulin 2.5 gm/dL    A/G Ratio 1.4 g/dL    BUN/Creatinine Ratio 18.1 7.0 - 25.0    Anion Gap 10.1 5.0 - 15.0 mmol/L    eGFR 48.7 (L) >60.0 mL/min/1.73   Protime-INR    Collection Time: 05/24/24 12:10 PM    Specimen: Blood    Result Value Ref Range    Protime 12.5 (H) 9.6 - 11.7 Seconds    INR 1.16 (H) 0.93 - 1.10   aPTT    Collection Time: 05/24/24 12:10 PM    Specimen: Blood   Result Value Ref Range    PTT 30.9 (L) 61.0 - 76.5 seconds   BNP    Collection Time: 05/24/24 12:10 PM    Specimen: Blood   Result Value Ref Range    proBNP 4,867.0 (H) 0.0 - 1,800.0 pg/mL   High Sensitivity Troponin T    Collection Time: 05/24/24 12:10 PM    Specimen: Blood   Result Value Ref Range    HS Troponin T 43 (H) <14 ng/L   D-dimer, Quantitative    Collection Time: 05/24/24 12:10 PM    Specimen: Blood   Result Value Ref Range    D-Dimer, Quantitative 1.60 (H) 0.00 - 0.77 mg/L (FEU)   CBC Auto Differential    Collection Time: 05/24/24 12:10 PM    Specimen: Blood   Result Value Ref Range    WBC 7.75 3.40 - 10.80 10*3/mm3    RBC 3.29 (L) 3.77 - 5.28 10*6/mm3    Hemoglobin 9.6 (L) 12.0 - 15.9 g/dL    Hematocrit 32.7 (L) 34.0 - 46.6 %    MCV 99.4 (H) 79.0 - 97.0 fL    MCH 29.2 26.6 - 33.0 pg    MCHC 29.4 (L) 31.5 - 35.7 g/dL    RDW 16.9 (H) 12.3 - 15.4 %    RDW-SD 62.1 (H) 37.0 - 54.0 fl    MPV 9.8 6.0 - 12.0 fL    Platelets 135 (L) 140 - 450 10*3/mm3    Neutrophil % 85.8 (H) 42.7 - 76.0 %    Lymphocyte % 4.6 (L) 19.6 - 45.3 %    Monocyte % 7.5 5.0 - 12.0 %    Eosinophil % 1.2 0.3 - 6.2 %    Basophil % 0.4 0.0 - 1.5 %    Immature Grans % 0.5 0.0 - 0.5 %    Neutrophils, Absolute 6.65 1.70 - 7.00 10*3/mm3    Lymphocytes, Absolute 0.36 (L) 0.70 - 3.10 10*3/mm3    Monocytes, Absolute 0.58 0.10 - 0.90 10*3/mm3    Eosinophils, Absolute 0.09 0.00 - 0.40 10*3/mm3    Basophils, Absolute 0.03 0.00 - 0.20 10*3/mm3    Immature Grans, Absolute 0.04 0.00 - 0.05 10*3/mm3    nRBC 0.0 0.0 - 0.2 /100 WBC   Blood Gas, Arterial -    Collection Time: 05/24/24 12:25 PM    Specimen: Arterial Blood   Result Value Ref Range    Site Left Radial     Orion's Test Positive     pH, Arterial 7.442 7.350 - 7.450 pH units    pCO2, Arterial 51.4 (H) 35.0 - 48.0 mm Hg    pO2, Arterial 56.7  (L) 83.0 - 108.0 mm Hg    HCO3, Arterial 35.0 (H) 21.0 - 28.0 mmol/L    Base Excess, Arterial 9.6 (H) 0.0 - 3.0 mmol/L    O2 Saturation, Arterial 89.5 (L) 94.0 - 98.0 %    CO2 Content 36.6 (H) 22 - 29 mmol/L    Barometric Pressure for Blood Gas      Modality Cannula     FIO2 40 %    Hemodilution No    High Sensitivity Troponin T 2Hr    Collection Time: 05/24/24  2:08 PM    Specimen: Blood   Result Value Ref Range    HS Troponin T 35 (H) <14 ng/L    Troponin T Delta -8 (L) >=-4 - <+4 ng/L   Respiratory Panel PCR w/COVID-19(SARS-CoV-2) HODAN/MARIA M/TWILA/PAD/COR/JENISE In-House, NP Swab in [facility], 2 HR TAT - Swab, Nasopharynx    Collection Time: 05/24/24  2:08 PM    Specimen: Nasopharynx; Swab   Result Value Ref Range    ADENOVIRUS, PCR Not Detected Not Detected    Coronavirus 229E Not Detected Not Detected    Coronavirus HKU1 Not Detected Not Detected    Coronavirus NL63 Not Detected Not Detected    Coronavirus OC43 Not Detected Not Detected    COVID19 Not Detected Not Detected - Ref. Range    Human Metapneumovirus Not Detected Not Detected    Human Rhinovirus/Enterovirus Not Detected Not Detected    Influenza A PCR Not Detected Not Detected    Influenza B PCR Not Detected Not Detected    Parainfluenza Virus 1 Not Detected Not Detected    Parainfluenza Virus 2 Not Detected Not Detected    Parainfluenza Virus 3 Not Detected Not Detected    Parainfluenza Virus 4 Not Detected Not Detected    RSV, PCR Not Detected Not Detected    Bordetella pertussis pcr Not Detected Not Detected    Bordetella parapertussis PCR Not Detected Not Detected    Chlamydophila pneumoniae PCR Not Detected Not Detected    Mycoplasma pneumo by PCR Not Detected Not Detected   T4, Free    Collection Time: 05/24/24  2:08 PM    Specimen: Blood   Result Value Ref Range    Free T4 1.42 0.93 - 1.70 ng/dL   Magnesium    Collection Time: 05/24/24  2:08 PM    Specimen: Blood   Result Value Ref Range    Magnesium 1.8 1.6 - 2.4 mg/dL   STAT Lactic Acid, Reflex     Collection Time: 05/24/24  6:10 PM    Specimen: Blood   Result Value Ref Range    Lactate 2.0 0.5 - 2.0 mmol/L   Basic Metabolic Panel    Collection Time: 05/25/24  4:43 AM    Specimen: Arm, Left; Blood   Result Value Ref Range    Glucose 104 (H) 65 - 99 mg/dL    BUN 24 (H) 8 - 23 mg/dL    Creatinine 1.16 (H) 0.57 - 1.00 mg/dL    Sodium 141 136 - 145 mmol/L    Potassium 4.0 3.5 - 5.2 mmol/L    Chloride 97 (L) 98 - 107 mmol/L    CO2 36.0 (H) 22.0 - 29.0 mmol/L    Calcium 9.7 8.6 - 10.5 mg/dL    BUN/Creatinine Ratio 20.7 7.0 - 25.0    Anion Gap 8.0 5.0 - 15.0 mmol/L    eGFR 48.7 (L) >60.0 mL/min/1.73   Magnesium    Collection Time: 05/25/24  4:43 AM    Specimen: Arm, Left; Blood   Result Value Ref Range    Magnesium 2.1 1.6 - 2.4 mg/dL   Lactic Acid, Plasma    Collection Time: 05/25/24  4:43 AM    Specimen: Arm, Left; Blood   Result Value Ref Range    Lactate 1.7 0.5 - 2.0 mmol/L   Phosphorus    Collection Time: 05/25/24  4:43 AM    Specimen: Arm, Left; Blood   Result Value Ref Range    Phosphorus 4.3 2.5 - 4.5 mg/dL   CBC Auto Differential    Collection Time: 05/25/24  4:43 AM    Specimen: Arm, Left; Blood   Result Value Ref Range    WBC 5.75 3.40 - 10.80 10*3/mm3    RBC 3.39 (L) 3.77 - 5.28 10*6/mm3    Hemoglobin 9.6 (L) 12.0 - 15.9 g/dL    Hematocrit 33.8 (L) 34.0 - 46.6 %    MCV 99.7 (H) 79.0 - 97.0 fL    MCH 28.3 26.6 - 33.0 pg    MCHC 28.4 (L) 31.5 - 35.7 g/dL    RDW 17.1 (H) 12.3 - 15.4 %    RDW-SD 61.9 (H) 37.0 - 54.0 fl    MPV 9.5 6.0 - 12.0 fL    Platelets 143 140 - 450 10*3/mm3    Neutrophil % 75.7 42.7 - 76.0 %    Lymphocyte % 10.8 (L) 19.6 - 45.3 %    Monocyte % 10.6 5.0 - 12.0 %    Eosinophil % 2.1 0.3 - 6.2 %    Basophil % 0.3 0.0 - 1.5 %    Immature Grans % 0.5 0.0 - 0.5 %    Neutrophils, Absolute 4.35 1.70 - 7.00 10*3/mm3    Lymphocytes, Absolute 0.62 (L) 0.70 - 3.10 10*3/mm3    Monocytes, Absolute 0.61 0.10 - 0.90 10*3/mm3    Eosinophils, Absolute 0.12 0.00 - 0.40 10*3/mm3    Basophils, Absolute 0.02  0.00 - 0.20 10*3/mm3    Immature Grans, Absolute 0.03 0.00 - 0.05 10*3/mm3    nRBC 0.0 0.0 - 0.2 /100 WBC        Imaging:  XR Chest 1 View  Narrative: XR CHEST 1 VW    Date of Exam: 5/24/2024 12:15 PM EDT    Indication: SOA    Comparison: 5/22/2024    Findings:  Cardiomegaly. Pulmonary vasculature is mildly prominent. Lungs appear hyperinflated. Small bilateral pleural effusions, with fluid tracking in the right major fissure. Atelectasis in the left lung base  Impression: Impression:    1. Cardiomegaly with pulmonary vascular congestion and small pleural effusions  2. 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    Barry Mckinley MD   5/25/2024  08:47 EDT

## 2024-05-25 NOTE — CONSULTS
Thank you very much for the consult    Reason For The Consult:s/p;  donor kidney transplant, with volume over load    HPI: the pt. Is 79 year old white female with h/o; esrd s/p;  donor kidney transplant, h/o; hypertension, afib , and chf , admitted with soa. Report she used to see dr. Falcon but quit seeing any nephrologist . Explained that she need to resume regular nephrologist visit tpo avoid loss of her transplant kidney  she primaily admitted due to increased soa , also found to have uti , she alos report some falls and generalized weakness . Creatinine close to her base line   PMH:   Past Medical History:   Diagnosis Date    Allergic rhinitis 2015    Asthma 08/10/2017    Atrial flutter 2017    Chronic diarrhea 04/15/2019    Chronic hypoxemic respiratory failure 2024    Chronic pain 2015    COPD (chronic obstructive pulmonary disease)     COVID-19 virus detected 2022    Cytokine release syndrome, grade 1 2022    Edema of both lower extremities due to peripheral venous insufficiency 2021    ESRD on hemodialysis 2013    Essential (primary) hypertension 2022    Fracture of ulnar styloid 2022    Fracture of unspecified carpal bone, right wrist, subsequent encounter for fracture with routine healing 2022    Gastroesophageal reflux disease 2020    Gout 08/10/2017    Hearing loss 08/10/2017    History of appendectomy     History of DVT (deep vein thrombosis) 2020    History of kidney transplant 2017    History of lobectomy of lung 2024:  RIGHT Lower Lobe Mass--> Right Video-assisted thoracoscopy with a moderate-to-large wedge resection of the RLL (by Dr. Haris Mcconnell @ [facility])--> Poorly differentiated carcinoma of the RLL.      History of repair of hip joint 2013    History of suicide attempt 2024    Hyperlipidemia 2014    Hypothyroidism, unspecified 2022     Infection due to extended spectrum beta lactamase (ESBL) producing bacteria 03/11/2016    No A2K system hx. +ESBL E coli urine on 3/11/16.      Irritable bowel syndrome 04/15/2019    Long term (current) use of immunosuppressive biologic 04/28/2021    Long term current use of anticoagulant therapy 01/18/2024    Malignant neoplasm of lung 08/10/2017    Menopausal flushing 08/30/2021    Mitral valve regurgitation 07/06/2015    Mixed anxiety and depressive disorder 04/30/2015    Non-small cell lung cancer 08/10/2017    Nonobstructive atherosclerosis of coronary artery 06/02/2019 08/12/2022: CATH: [facility]: NSTEMI assoc with Covid-19: LM:-nl;  LAD: diffuse, Ca++; 30%; CIRC: Dominant. Normal; RCA: small; 50% diffuse, proximal and mid-segment.      NSTEMI (non-ST elevated myocardial infarction) 08/11/2022    Obsessive-compulsive disorder 04/15/2019    Osteoarthritis 05/20/2024    Paroxysmal atrial fibrillation 05/11/2017    Paroxysmal supraventricular tachycardia 05/11/2017    Peripheral neuropathy 08/11/2014    Personal history of peptic ulcer disease 11/12/2020    Postoperative anemia due to acute blood loss 05/22/2013    Right wrist fracture 03/01/2022    Seasonal allergies 08/10/2017    Secondary hyperparathyroidism of renal origin 09/14/2015    Tricuspid valve regurgitation 03/15/2017    Ulcer of lower extremity 08/30/2021    Unspecified acute appendicitis 03/03/2022    Valvular heart disease 05/20/2024    Wegener's granulomatosis with renal involvement 03/03/2022      Past Surgical History:   Procedure Laterality Date    APPENDECTOMY      BREAST SURGERY Left     cysts rmeoved    CARDIAC CATHETERIZATION Right 08/12/2022    Procedure: Left Heart Cath and coronary angiogram;  Surgeon: Jak Gavin MD;  Location: [facility] CATH INVASIVE LOCATION;  Service: Cardiology;  Laterality: Right;    CLOSED REDUCTION WRIST FRACTURE Right 03/01/2022    Procedure: WRIST CLOSED REDUCTION;  Surgeon: Gaudencio Townsend MD;  Location:   [facility];  Service: Orthopedics;  Laterality: Right;    CYSTOSCOPY      HIP ARTHROPLASTY      HYSTERECTOMY      LUNG LOBECTOMY Right     TRANSPLANTATION RENAL       FHX:   Family History   Problem Relation Age of Onset    No Known Problems Mother     No Known Problems Father      SHX:   Social History     Substance and Sexual Activity   Alcohol Use Never      Social History     Tobacco Use   Smoking Status Former   Smokeless Tobacco Never      Social History     Substance and Sexual Activity   Drug Use Never    No     Home Medications:   Medications Prior to Admission   Medication Sig Dispense Refill Last Dose    acetaminophen (Tylenol) 325 MG tablet Take 2 tablets by mouth Every 4 (Four) Hours As Needed for Fever or Mild Pain.       albuterol sulfate  (90 Base) MCG/ACT inhaler Inhale 2 puffs Every 6 (Six) Hours As Needed for Wheezing.       apixaban (ELIQUIS) 5 MG tablet tablet Take 1 tablet by mouth Every 12 (Twelve) Hours. 60 tablet 0     Carboxymethylcellulose Sodium (DRY EYE RELIEF OP) Apply 2 drops to eye(s) as directed by provider 2 (Two) Times a Day As Needed (dry eyes).       colestipol (COLESTID) 1 g tablet Take 1 tablet by mouth Daily.       diazePAM (VALIUM) 5 MG tablet Take 5 mg by mouth 2 (Two) Times a Day As Needed for Anxiety.       Diclofenac Sodium (Aspercreme Arthritis Pain) 1 % gel gel Apply 4 g topically to the appropriate area as directed 2 (Two) Times a Day As Needed (pain).       dilTIAZem (CARDIZEM) 90 MG tablet Take 1 tablet by mouth Every 8 (Eight) Hours. 60 tablet 0     DULoxetine HCl 40 MG capsule delayed-release particles Take 1 capsule by mouth Daily.       ferrous sulfate 324 (65 Fe) MG tablet delayed-release EC tablet Take 1 tablet by mouth Daily With Breakfast. 30 tablet 0     furosemide (LASIX) 80 MG tablet Take 1 tablet by mouth 2 (Two) Times a Day. 60 tablet 0     guaiFENesin (Mucinex) 600 MG 12 hr tablet Take 1,200 mg by mouth 2 (Two) Times a Day.       levothyroxine  (SYNTHROID, LEVOTHROID) 50 MCG tablet Take 1 tablet by mouth Daily.       loperamide (IMODIUM) 2 MG capsule Take 2 mg by mouth 4 (Four) Times a Day As Needed for Diarrhea.       metoprolol succinate XL (TOPROL-XL) 50 MG 24 hr tablet Take 50 mg by mouth Daily.       Wbjqg-Pregz-Jzfclnn-Pramoxine (Neosporin + Pain Relief Max St) 1 % ointment Apply 1 application topically to the appropriate area as directed 2 (Two) Times a Day As Needed (sores).       potassium chloride (KLOR-CON M20) 20 MEQ CR tablet Take 2 tablets by mouth Daily. 60 tablet 0     predniSONE (DELTASONE) 5 MG tablet Take 5 mg by mouth Daily.       Salicylic Acid-Urea (KERASAL EX) Apply 1 application topically to the appropriate area as directed 2 (Two) Times a Day As Needed (dry feet).       tacrolimus (PROGRAF) 1 MG capsule Take 1 mg by mouth 2 (Two) Times a Day.          Allergies:   Allergies   Allergen Reactions    Zolpidem Other (See Comments), Unknown (See Comments) and Unknown - High Severity     KEPT HER AWAKE  KEPT HER AWAKE  KEPT HER AWAKE  KEPT HER AWAKE         Physical Exam:  General: in nad    Vital Signs  Vitals:    05/25/24 0700   BP: 123/58   Pulse:    Resp: 20   Temp: 97.5 °F (36.4 °C)   SpO2: 97%     I/O this shift:  In: 720 [P.O.:720]  Out: -   I/O last 3 completed shifts:  In: 480 [P.O.:480]  Out: 200 [Urine:200]      HEENT:  Normocephalic, Atraumatic, EOMI, PERRLA, NO icterus,  Neck:  Supple, No JVD, No Carotid artery bruit, No lymphadenopathy  Chest: bibasilar crackles   Cardiovascular: Regular rate and rhythm. Positive S1 & S2, No rub, murmur or gallop.  Abdominal: Soft, nontender, no palpable organomegaly, no abdominal bruit, positive bowel sounds  Musculoskeletal: No joint tenderness or swelling, good range of motion, Adequate muscle strength on both sides, no cyanosis, clubbing positive edema on lower extremities.  Neuro: Alert oriented x3, CN1 - 12 intact, No focal sensory or motor deficit.      Review of Systems:   CNS:  Denied headaches, blurred vision, tingling or numbness in any part of body. No weakness or any impaired speech.  CVS: No chest pain or shortness of breath, No orthopnea or PND or exertional dyspnea,  Pulmonary: report shortness of breath, no coughs, hematemesis, night sweats or weight loss.  GI: Denies diarrhea, nausea, vomiting. Denies weight loss, hematemesis, melena.  : Denies dysuria, frequency or hesitancy of urination  Vascular: Denies claudication, resting pain, tingling numbness or weakness in any part of the body.  Musculoskeletal: Denies Joint tenderness or pain, denies stiffness in joints, denies fever or weight loss, or skin rashes.    Current Labs  Lab Results (last 24 hours)       Procedure Component Value Units Date/Time    Basic Metabolic Panel [[id number]]  (Abnormal) Collected: 05/25/24 0443    Specimen: Blood from Arm, Left Updated: 05/25/24 0537     Glucose 104 mg/dL      BUN 24 mg/dL      Creatinine 1.16 mg/dL      Sodium 141 mmol/L      Potassium 4.0 mmol/L      Chloride 97 mmol/L      CO2 36.0 mmol/L      Calcium 9.7 mg/dL      BUN/Creatinine Ratio 20.7     Anion Gap 8.0 mmol/L      eGFR 48.7 mL/min/1.73     Narrative:      GFR Normal >60  Chronic Kidney Disease <60  Kidney Failure <15    The GFR formula is only valid for adults with stable renal function between ages 18 and 70.    Magnesium [[id number]]  (Normal) Collected: 05/25/24 0443    Specimen: Blood from Arm, Left Updated: 05/25/24 0537     Magnesium 2.1 mg/dL     Phosphorus [[id number]]  (Normal) Collected: 05/25/24 0443    Specimen: Blood from Arm, Left Updated: 05/25/24 0537     Phosphorus 4.3 mg/dL     Lactic Acid, Plasma [[id number]]  (Normal) Collected: 05/25/24 0443    Specimen: Blood from Arm, Left Updated: 05/25/24 0535     Lactate 1.7 mmol/L     CBC & Differential [[id number]]  (Abnormal) Collected: 05/25/24 0443    Specimen: Blood from Arm, Left Updated: 05/25/24 0527    Narrative:      The following orders were created for  panel order CBC & Differential.  Procedure                               Abnormality         Status                     ---------                               -----------         ------                     CBC Auto Differential[[id number]]        Abnormal            Final result                 Please view results for these tests on the individual orders.    CBC Auto Differential [[id number]]  (Abnormal) Collected: 05/25/24 0443    Specimen: Blood from Arm, Left Updated: 05/25/24 0527     WBC 5.75 10*3/mm3      RBC 3.39 10*6/mm3      Hemoglobin 9.6 g/dL      Hematocrit 33.8 %      MCV 99.7 fL      MCH 28.3 pg      MCHC 28.4 g/dL      RDW 17.1 %      RDW-SD 61.9 fl      MPV 9.5 fL      Platelets 143 10*3/mm3      Neutrophil % 75.7 %      Lymphocyte % 10.8 %      Monocyte % 10.6 %      Eosinophil % 2.1 %      Basophil % 0.3 %      Immature Grans % 0.5 %      Neutrophils, Absolute 4.35 10*3/mm3      Lymphocytes, Absolute 0.62 10*3/mm3      Monocytes, Absolute 0.61 10*3/mm3      Eosinophils, Absolute 0.12 10*3/mm3      Basophils, Absolute 0.02 10*3/mm3      Immature Grans, Absolute 0.03 10*3/mm3      nRBC 0.0 /100 WBC     Tacrolimus Level [[id number]] Collected: 05/25/24 0443    Specimen: Blood from Arm, Left Updated: 05/25/24 0515    STAT Lactic Acid, Reflex [[id number]]  (Normal) Collected: 05/24/24 1810    Specimen: Blood Updated: 05/24/24 1842     Lactate 2.0 mmol/L     Magnesium [[id number]]  (Normal) Collected: 05/24/24 1408    Specimen: Blood Updated: 05/24/24 1628     Magnesium 1.8 mg/dL     T4, Free [[id number]]  (Normal) Collected: 05/24/24 1408    Specimen: Blood Updated: 05/24/24 1617     Free T4 1.42 ng/dL     Narrative:      Results may be falsely increased if patient taking Biotin.      Respiratory Panel PCR w/COVID-19(SARS-CoV-2) [facility] In-House, NP Swab in UTM/VTM, 2 HR TAT - Swab, Nasopharynx [[id number]]  (Normal) Collected: 05/24/24 1408    Specimen: Swab from Nasopharynx Updated: 05/24/24  1512     ADENOVIRUS, PCR Not Detected     Coronavirus 229E Not Detected     Coronavirus HKU1 Not Detected     Coronavirus NL63 Not Detected     Coronavirus OC43 Not Detected     COVID19 Not Detected     Human Metapneumovirus Not Detected     Human Rhinovirus/Enterovirus Not Detected     Influenza A PCR Not Detected     Influenza B PCR Not Detected     Parainfluenza Virus 1 Not Detected     Parainfluenza Virus 2 Not Detected     Parainfluenza Virus 3 Not Detected     Parainfluenza Virus 4 Not Detected     RSV, PCR Not Detected     Bordetella pertussis pcr Not Detected     Bordetella parapertussis PCR Not Detected     Chlamydophila pneumoniae PCR Not Detected     Mycoplasma pneumo by PCR Not Detected    Narrative:      In the setting of a positive respiratory panel with a viral infection PLUS a negative procalcitonin without other underlying concern for bacterial infection, consider observing off antibiotics or discontinuation of antibiotics and continue supportive care. If the respiratory panel is positive for atypical bacterial infection (Bordetella pertussis, Chlamydophila pneumoniae, or Mycoplasma pneumoniae), consider antibiotic de-escalation to target atypical bacterial infection.    High Sensitivity Troponin T 2Hr [[id number]]  (Abnormal) Collected: 05/24/24 1408    Specimen: Blood Updated: 05/24/24 1443     HS Troponin T 35 ng/L      Troponin T Delta -8 ng/L     Narrative:      High Sensitive Troponin T Reference Range:  <14.0 ng/L- Negative Female for AMI  <22.0 ng/L- Negative Male for AMI  >=14 - Abnormal Female indicating possible myocardial injury.  >=22 - Abnormal Male indicating possible myocardial injury.   Clinicians would have to utilize clinical acumen, EKG, Troponin, and serial changes to determine if it is an Acute Myocardial Infarction or myocardial injury due to an underlying chronic condition.         Blood Culture - Blood, Arm, Left [[id number]] Collected: 05/24/24 1311    Specimen: Blood from  Arm, Left Updated: 05/24/24 1313    Blood Culture - Blood, Arm, Left [[id number]] Collected: 05/24/24 1251    Specimen: Blood from Arm, Left Updated: 05/24/24 1253    Comprehensive Metabolic Panel [[id number]]  (Abnormal) Collected: 05/24/24 1210    Specimen: Blood Updated: 05/24/24 1243     Glucose 154 mg/dL      BUN 21 mg/dL      Creatinine 1.16 mg/dL      Sodium 137 mmol/L      Potassium 4.0 mmol/L      Chloride 94 mmol/L      CO2 32.9 mmol/L      Calcium 10.1 mg/dL      Total Protein 6.1 g/dL      Albumin 3.6 g/dL      ALT (SGPT) 9 U/L      AST (SGOT) 16 U/L      Alkaline Phosphatase 59 U/L      Total Bilirubin 1.2 mg/dL      Globulin 2.5 gm/dL      A/G Ratio 1.4 g/dL      BUN/Creatinine Ratio 18.1     Anion Gap 10.1 mmol/L      eGFR 48.7 mL/min/1.73     Narrative:      GFR Normal >60  Chronic Kidney Disease <60  Kidney Failure <15    The GFR formula is only valid for adults with stable renal function between ages 18 and 70.    BNP [[id number]]  (Abnormal) Collected: 05/24/24 1210    Specimen: Blood Updated: 05/24/24 1237     proBNP 4,867.0 pg/mL     Narrative:      This assay is used as an aid in the diagnosis of individuals suspected of having heart failure. It can be used as an aid in the diagnosis of acute decompensated heart failure (ADHF) in patients presenting with signs and symptoms of ADHF to the emergency department (ED). In addition, NT-proBNP of <300 pg/mL indicates ADHF is not likely.    Age Range Result Interpretation  NT-proBNP Concentration (pg/mL:      <50             Positive            >450                   Gray                 300-450                    Negative             <300    50-75           Positive            >900                  Gray                300-900                  Negative            <300      >75             Positive            >1800                  Gray                300-1800                  Negative            <300    High Sensitivity Troponin T [[id number]]  (Abnormal)  Collected: 05/24/24 1210    Specimen: Blood Updated: 05/24/24 1237     HS Troponin T 43 ng/L     Narrative:      High Sensitive Troponin T Reference Range:  <14.0 ng/L- Negative Female for AMI  <22.0 ng/L- Negative Male for AMI  >=14 - Abnormal Female indicating possible myocardial injury.  >=22 - Abnormal Male indicating possible myocardial injury.   Clinicians would have to utilize clinical acumen, EKG, Troponin, and serial changes to determine if it is an Acute Myocardial Infarction or myocardial injury due to an underlying chronic condition.         Blood Gas, Arterial - [[id number]]  (Abnormal) Collected: 05/24/24 1225    Specimen: Arterial Blood Updated: 05/24/24 1231     Site Left Radial     Orion's Test Positive     pH, Arterial 7.442 pH units      pCO2, Arterial 51.4 mm Hg      pO2, Arterial 56.7 mm Hg      HCO3, Arterial 35.0 mmol/L      Base Excess, Arterial 9.6 mmol/L      Comment: Serial Number: [id number]:  [id number]        O2 Saturation, Arterial 89.5 %      CO2 Content 36.6 mmol/L      Barometric Pressure for Blood Gas --     Comment: N/A        Modality Cannula     FIO2 40 %      Hemodilution No    Protime-INR [[id number]]  (Abnormal) Collected: 05/24/24 1210    Specimen: Blood Updated: 05/24/24 1227     Protime 12.5 Seconds      INR 1.16    aPTT [[id number]]  (Abnormal) Collected: 05/24/24 1210    Specimen: Blood Updated: 05/24/24 1227     PTT 30.9 seconds     D-dimer, Quantitative [[id number]]  (Abnormal) Collected: 05/24/24 1210    Specimen: Blood Updated: 05/24/24 1227     D-Dimer, Quantitative 1.60 mg/L (FEU)     Narrative:      According to the assay 's published package insert, a normal (<0.50 mg/L (FEU)) D-dimer result in conjunction with a non-high clinical probability assessment, excludes deep vein thrombosis (DVT) and pulmonary embolism (PE) with high sensitivity.    D-dimer values increase with age and this can make VTE exclusion of an older population difficult. To address this,  "the [employer], based on best available evidence and recent guidelines, recommends that clinicians use age-adjusted D-dimer thresholds in patients greater than 50 years of age with: a) a low probability of PE who do not meet all Pulmonary Embolism Rule Out Criteria, or b) in those with intermediate probability of PE.   The formula for an age-adjusted D-dimer cut-off is \"age/100\".  For example, a 60 year old patient would have an age-adjusted cut-off of 0.60 mg/L (FEU) and an 80 year old 0.80 mg/L (FEU).    CBC & Differential [[id number]]  (Abnormal) Collected: 05/24/24 1210    Specimen: Blood Updated: 05/24/24 1215    Narrative:      The following orders were created for panel order CBC & Differential.  Procedure                               Abnormality         Status                     ---------                               -----------         ------                     CBC Auto Differential[[id number]]        Abnormal            Final result                 Please view results for these tests on the individual orders.    CBC Auto Differential [[id number]]  (Abnormal) Collected: 05/24/24 1210    Specimen: Blood Updated: 05/24/24 1215     WBC 7.75 10*3/mm3      RBC 3.29 10*6/mm3      Hemoglobin 9.6 g/dL      Hematocrit 32.7 %      MCV 99.4 fL      MCH 29.2 pg      MCHC 29.4 g/dL      RDW 16.9 %      RDW-SD 62.1 fl      MPV 9.8 fL      Platelets 135 10*3/mm3      Neutrophil % 85.8 %      Lymphocyte % 4.6 %      Monocyte % 7.5 %      Eosinophil % 1.2 %      Basophil % 0.4 %      Immature Grans % 0.5 %      Neutrophils, Absolute 6.65 10*3/mm3      Lymphocytes, Absolute 0.36 10*3/mm3      Monocytes, Absolute 0.58 10*3/mm3      Eosinophils, Absolute 0.09 10*3/mm3      Basophils, Absolute 0.03 10*3/mm3      Immature Grans, Absolute 0.04 10*3/mm3      nRBC 0.0 /100 WBC     POC Lactate [[id number]]  (Abnormal) Collected: 05/24/24 1205    Specimen: Blood Updated: 05/24/24 1207     Lactate 2.4 mmol/L      " Comment: Serial Number: [id number]:  [id number]             Current Radiology  Imaging Results (Last 24 Hours)       Procedure Component Value Units Date/Time    XR Chest 1 View [[id number]] Collected: 05/24/24 1236     Updated: 05/24/24 1240    Narrative:      XR CHEST 1 VW    Date of Exam: 5/24/2024 12:15 PM EDT    Indication: SOA    Comparison: 5/22/2024    Findings:  Cardiomegaly. Pulmonary vasculature is mildly prominent. Lungs appear hyperinflated. Small bilateral pleural effusions, with fluid tracking in the right major fissure. Atelectasis in the left lung base      Impression:      Impression:    1. Cardiomegaly with pulmonary vascular congestion and small pleural effusions  2. Probable COPD with left basilar atelectasis      Electronically Signed: Ricardo Chavarria    5/24/2024 12:38 PM EDT    Workstation ID: [id number]          Current Meds    Current Facility-Administered Medications:     acetaminophen (TYLENOL) tablet 650 mg, 650 mg, Oral, Q4H PRN, Janene Archer APRN    apixaban (ELIQUIS) tablet 5 mg, 5 mg, Oral, Q12H, Juanis Lowe MD, 5 mg at 05/25/24 0824    Calcium Replacement - Follow Nurse / BPA Driven Protocol, , Does not apply, PRN, Janene Archer APRN    carboxymethylcellulose sod 1 % eye gel 1 drop, 1 drop, Both Eyes, TID PRN, Juansi Lowe MD    cholestyramine light packet 4 g, 1 packet, Oral, Daily, Juanis Lowe MD, 4 g at 05/25/24 0825    diazePAM (VALIUM) tablet 5 mg, 5 mg, Oral, BID PRN, Juanis Lowe MD, 5 mg at 05/24/24 2113    Diclofenac Sodium (VOLTAREN) 1 % gel 4 g, 4 g, Topical, BID PRN, Juanis Lowe MD    dilTIAZem (CARDIZEM) tablet 90 mg, 90 mg, Oral, Q8H, Juanis Lowe MD, 90 mg at 05/24/24 2113    DULoxetine (CYMBALTA) DR capsule 40 mg, 40 mg, Oral, Daily, Juanis Lowe MD, 40 mg at 05/25/24 0825    famotidine (PEPCID) tablet 20 mg, 20 mg, Oral, Daily, Juanis Lowe MD, 20 mg at 05/25/24 0824    ferrous sulfate EC tablet 324 mg, 324 mg, Oral, Daily With Breakfast, Juanis Lowe MD,  324 mg at 05/25/24 0824    furosemide (LASIX) tablet 80 mg, 80 mg, Oral, Daily, Juanis Lowe MD, 80 mg at 05/25/24 0824    guaiFENesin (MUCINEX) 12 hr tablet 1,200 mg, 1,200 mg, Oral, BID, Juanis Lowe MD, 1,200 mg at 05/25/24 0824    hydrALAZINE (APRESOLINE) injection 10 mg, 10 mg, Intravenous, Q6H PRN, Janene Archer APRN    HYDROcodone-acetaminophen (NORCO) 5-325 MG per tablet 1 tablet, 1 tablet, Oral, Q8H PRN, Juanis Lowe MD, 1 tablet at 05/24/24 2257    levothyroxine (SYNTHROID, LEVOTHROID) tablet 50 mcg, 50 mcg, Oral, Q AM, Juanis Lowe MD, 50 mcg at 05/25/24 0437    loperamide (IMODIUM) capsule 2 mg, 2 mg, Oral, 4x Daily PRN, Juanis Lowe MD    Magnesium Standard Dose Replacement - Follow Nurse / BPA Driven Protocol, , Does not apply, PRN, Janene Archer APRN    metoprolol succinate XL (TOPROL-XL) 24 hr tablet 50 mg, 50 mg, Oral, Daily, Juanis Lowe MD, 50 mg at 05/25/24 0824    nitroglycerin (NITROSTAT) SL tablet 0.4 mg, 0.4 mg, Sublingual, Q5 Min PRN, Janene Archer APRN    ondansetron ODT (ZOFRAN-ODT) disintegrating tablet 4 mg, 4 mg, Oral, Q6H PRN **OR** ondansetron (ZOFRAN) injection 4 mg, 4 mg, Intravenous, Q6H PRN, Janene Archer APRN    Phosphorus Replacement - Follow Nurse / BPA Driven Protocol, , Does not apply, PRN, Janene Archer APRN    potassium chloride (KLOR-CON M20) CR tablet 40 mEq, 40 mEq, Oral, Daily, Juanis Lowe MD, 40 mEq at 05/25/24 0824    Potassium Replacement - Follow Nurse / BPA Driven Protocol, , Does not apply, PRN, Janene Archer APRN    predniSONE (DELTASONE) tablet 5 mg, 5 mg, Oral, Daily With Breakfast, Juanis Lowe MD, 5 mg at 05/25/24 0824    [COMPLETED] Insert Peripheral IV, , , Once **AND** sodium chloride 0.9 % flush 10 mL, 10 mL, Intravenous, PRN, Mila Theodore APRN    sodium chloride 0.9 % flush 10 mL, 10 mL, Intravenous, Q12H, Janene Archer APRN, 10 mL at 05/25/24 0824    sodium chloride 0.9 % flush 10 mL, 10 mL, Intravenous, PRN, Janene Archer  M, APRN    sodium chloride 0.9 % infusion 40 mL, 40 mL, Intravenous, PRN, Janene Archer M, APRN    tacrolimus (PROGRAF) capsule 1 mg, 1 mg, Oral, BID, Juanis Lowe MD, 1 mg at 24 0824        Assessment and plan;        Acute exacerbation of CHF (congestive heart failure)  Acute chf exacerbation advised on salt and process food restriction and adjust diuretics   S/p;  donor kidney transplant , will check prograf level continue immunosuppression  Anemia , check iron studies         Alexandro Landry MD FACP, FASN.  24  10:31 EDT

## 2024-05-25 NOTE — CASE MANAGEMENT/SOCIAL WORK
Discharge Planning Assessment   [facility]     Patient Name: Jaja Carcamo  MRN: [id number]  Today's Date: 5/25/2024    Admit Date: 5/24/2024    Plan: D/C plan: Pt anticipates home, pending PT/OT isis. Current with [facility] for Home O2.   Discharge Needs Assessment       Row Name 05/25/24 1601       Living Environment    People in Home alone    Current Living Arrangements home    Potentially Unsafe Housing Conditions none    In the past 12 months has the electric, gas, oil, or water company threatened to shut off services in your home? No    Primary Care Provided by self    Provides Primary Care For no one, unable/limited ability to care for self    Family Caregiver if Needed child(katarzyna), adult    Family Caregiver Names Son Aris    Quality of Family Relationships supportive    Able to Return to Prior Arrangements yes    Living Arrangement Comments Pt anticipates home at dc       Resource/Environmental Concerns    Resource/Environmental Concerns none    Transportation Concerns no car       Transportation Needs    In the past 12 months, has lack of transportation kept you from medical appointments or from getting medications? no    In the past 12 months, has lack of transportation kept you from meetings, work, or from getting things needed for daily living? No       Food Insecurity    Within the past 12 months, you worried that your food would run out before you got the money to buy more. Never true    Within the past 12 months, the food you bought just didn't last and you didn't have money to get more. Never true       Transition Planning    Patient/Family Anticipates Transition to home with help/services;inpatient rehabilitation facility    Transportation Anticipated family or friend will provide       Discharge Needs Assessment    Equipment Currently Used at Home walker, rolling;oxygen;pulse ox    Concerns to be Addressed discharge planning    Concerns Comments Lives alone, becoming less independent    Equipment  Needed After Discharge none    Outpatient/Agency/Support Group Needs homecare agency    Discharge Facility/Level of Care Needs home with home health;nursing facility, skilled    Provided Post Acute Provider List? Refused    Provided Post Acute Provider Quality & Resource List? Refused                   Discharge Plan       Row Name 05/25/24 1202       Plan    Plan D/C plan: Pt anticipates home, pending PT/OT evals. Current with [facility] for Home O2.    Plan Comments Spoke with patient via telephone, confirmed PCP and Pharmacy. Pt states she lives home alone and son provides asssit with shopping and transportation. Pt is concerned that she is requiring more assistance and states she has been discussing Assisted Living with her son, Aris who is a good support person for her. Discussed poss dc plans pending PT/OT eval and declined HH or SNF list at this time. She states she will wait for therapy recommendation and discuss with her son at that time. Pt has Home O2 through [facility] and denies financial concerns. Aris will provide dc transportation.                           DC Barriers: IV ABX; Consults: Nephrology, Pulmonary, Cardiology.                  Continued Care and Services - Admitted Since 5/24/2024    No active coordination exists for this encounter.          Demographic Summary       Row Name 05/25/24 1600       General Information    Admission Type inpatient    Arrived From emergency department    Referral Source admission list    Reason for Consult discharge planning    Preferred Language English       Contact Information    Contact Information Obtained for                    Functional Status       Row Name 05/25/24 1600       Functional Status    Usual Activity Tolerance moderate    Current Activity Tolerance poor       Physical Activity    On average, how many days per week do you engage in moderate to strenuous exercise (like a brisk walk)? 4 days    On average, how many minutes do you  engage in exercise at this level? 10 min    Number of minutes of exercise per week 40       Assessment of Health Literacy    How often do you have someone help you read hospital materials? Occasionally    How often do you have problems learning about your medical condition because of difficulty understanding written information? Occasionally    How often do you have a problem understanding what is told to you about your medical condition? Sometimes    How confident are you filling out medical forms by yourself? Somewhat    Health Literacy Good       Functional Status, IADL    Medications independent    Meal Preparation independent    Housekeeping independent    Laundry independent    Shopping assistive person    IADL Comments Pt son, Aris, assists with shopping and transportation.       Mental Status Summary    Recent Changes in Mental Status/Cognitive Functioning no changes                  Elena Lechuga RN    Phone [phone]  Fax [phone]   Elena Lechuga

## 2024-05-25 NOTE — CONSULTS
Referring Provider: Juanis Lowe MD        History of present illness:    79-year-old female with past medical history of ESRD status post  donor kidney transplant in 2017, atrial fibrillation on Eliquis, heart failure with preserved ejection fraction, COPD/lung cancer presents to [facility] in setting of bilateral pedal edema and generalized weakness, currently undergoing IV diuresis, nephrology team following  Patient initially presented with generalized weakness and worsening pedal edema with shortness of air.  Appears to be fluid overload and currently on IV diuresis  Review of systems: Negative unless as noted in HPI    Personal History:      Past Medical History:   Diagnosis Date    Allergic rhinitis 2015    Asthma 08/10/2017    Atrial flutter 2017    Chronic diarrhea 04/15/2019    Chronic hypoxemic respiratory failure 2024    Chronic pain 2015    COPD (chronic obstructive pulmonary disease)     COVID-19 virus detected 2022    Cytokine release syndrome, grade 1 2022    Edema of both lower extremities due to peripheral venous insufficiency 2021    ESRD on hemodialysis 2013    Essential (primary) hypertension 2022    Fracture of ulnar styloid 2022    Fracture of unspecified carpal bone, right wrist, subsequent encounter for fracture with routine healing 2022    Gastroesophageal reflux disease 2020    Gout 08/10/2017    Hearing loss 08/10/2017    History of appendectomy     History of DVT (deep vein thrombosis) 2020    History of kidney transplant 2017    History of lobectomy of lung 2024:  RIGHT Lower Lobe Mass--> Right Video-assisted thoracoscopy with a moderate-to-large wedge resection of the RLL (by Dr. Haris Mcconnell @ [facility])--> Poorly differentiated carcinoma of the RLL.      History of repair of hip joint 2013    History of suicide attempt 2024     Hyperlipidemia 08/11/2014    Hypothyroidism, unspecified 03/03/2022    Infection due to extended spectrum beta lactamase (ESBL) producing bacteria 03/11/2016    No A2K system hx. +ESBL E coli urine on 3/11/16.      Irritable bowel syndrome 04/15/2019    Long term (current) use of immunosuppressive biologic 04/28/2021    Long term current use of anticoagulant therapy 01/18/2024    Malignant neoplasm of lung 08/10/2017    Menopausal flushing 08/30/2021    Mitral valve regurgitation 07/06/2015    Mixed anxiety and depressive disorder 04/30/2015    Non-small cell lung cancer 08/10/2017    Nonobstructive atherosclerosis of coronary artery 06/02/2019 08/12/2022: CATH: [facility]: NSTEMI assoc with Covid-19: LM:-nl;  LAD: diffuse, Ca++; 30%; CIRC: Dominant. Normal; RCA: small; 50% diffuse, proximal and mid-segment.      NSTEMI (non-ST elevated myocardial infarction) 08/11/2022    Obsessive-compulsive disorder 04/15/2019    Osteoarthritis 05/20/2024    Paroxysmal atrial fibrillation 05/11/2017    Paroxysmal supraventricular tachycardia 05/11/2017    Peripheral neuropathy 08/11/2014    Personal history of peptic ulcer disease 11/12/2020    Postoperative anemia due to acute blood loss 05/22/2013    Right wrist fracture 03/01/2022    Seasonal allergies 08/10/2017    Secondary hyperparathyroidism of renal origin 09/14/2015    Tricuspid valve regurgitation 03/15/2017    Ulcer of lower extremity 08/30/2021    Unspecified acute appendicitis 03/03/2022    Valvular heart disease 05/20/2024    Wegener's granulomatosis with renal involvement 03/03/2022       Past Surgical History:   Procedure Laterality Date    APPENDECTOMY      BREAST SURGERY Left     cysts rmeoved    CARDIAC CATHETERIZATION Right 08/12/2022    Procedure: Left Heart Cath and coronary angiogram;  Surgeon: Jak Gavin MD;  Location: [facility] INVASIVE LOCATION;  Service: Cardiology;  Laterality: Right;    CLOSED REDUCTION WRIST FRACTURE Right 03/01/2022     Procedure: WRIST CLOSED REDUCTION;  Surgeon: Gaudencio Townsend MD;  Location: [facility] MAIN OR;  Service: Orthopedics;  Laterality: Right;    CYSTOSCOPY      HIP ARTHROPLASTY      HYSTERECTOMY      LUNG LOBECTOMY Right     TRANSPLANTATION RENAL         Family History   Problem Relation Age of Onset    No Known Problems Mother     No Known Problems Father        Social History     Tobacco Use    Smoking status: Former    Smokeless tobacco: Never   Vaping Use    Vaping status: Former   Substance Use Topics    Alcohol use: Never    Drug use: Never        Home meds:  Prior to Admission medications    Medication Sig Start Date End Date Taking? Authorizing Provider   acetaminophen (Tylenol) 325 MG tablet Take 2 tablets by mouth Every 4 (Four) Hours As Needed for Fever or Mild Pain. 3/13/20   Court Vences MD   albuterol sulfate  (90 Base) MCG/ACT inhaler Inhale 2 puffs Every 6 (Six) Hours As Needed for Wheezing.    Court Vences MD   apixaban (ELIQUIS) 5 MG tablet tablet Take 1 tablet by mouth Every 12 (Twelve) Hours. 8/16/22   Clyde White,    Carboxymethylcellulose Sodium (DRY EYE RELIEF OP) Apply 2 drops to eye(s) as directed by provider 2 (Two) Times a Day As Needed (dry eyes). 5/11/22   Court Vences MD   colestipol (COLESTID) 1 g tablet Take 1 tablet by mouth Daily.    Court Vences MD   diazePAM (VALIUM) 5 MG tablet Take 5 mg by mouth 2 (Two) Times a Day As Needed for Anxiety.    Court Vences MD   Diclofenac Sodium (Aspercreme Arthritis Pain) 1 % gel gel Apply 4 g topically to the appropriate area as directed 2 (Two) Times a Day As Needed (pain). 10/21/20   Court Vences MD   dilTIAZem (CARDIZEM) 90 MG tablet Take 1 tablet by mouth Every 8 (Eight) Hours. 5/22/24   Mariangel Stearns APRN   DULoxetine HCl 40 MG capsule delayed-release particles Take 1 capsule by mouth Daily.    Court Vences MD   ferrous sulfate 324 (65 Fe) MG tablet  delayed-release EC tablet Take 1 tablet by mouth Daily With Breakfast. 5/23/24   Mariangel Stearns APRN   furosemide (LASIX) 80 MG tablet Take 1 tablet by mouth 2 (Two) Times a Day. 5/22/24   Mariangel Stearns APRN   guaiFENesin (Mucinex) 600 MG 12 hr tablet Take 1,200 mg by mouth 2 (Two) Times a Day. 8/16/22   Court Vences MD   levothyroxine (SYNTHROID, LEVOTHROID) 50 MCG tablet Take 1 tablet by mouth Daily.    Court Vences MD   loperamide (IMODIUM) 2 MG capsule Take 2 mg by mouth 4 (Four) Times a Day As Needed for Diarrhea. 3/13/20   Court Vences MD   metoprolol succinate XL (TOPROL-XL) 50 MG 24 hr tablet Take 50 mg by mouth Daily.    Court Vences MD   Ftuek-Dezmf-Nvbprqc-Pramoxine (Neosporin + Pain Relief Max St) 1 % ointment Apply 1 application topically to the appropriate area as directed 2 (Two) Times a Day As Needed (sores). 7/2/20   Court Vences MD   potassium chloride (KLOR-CON M20) 20 MEQ CR tablet Take 2 tablets by mouth Daily. 5/22/24   Mariangel Stearns APRN   predniSONE (DELTASONE) 5 MG tablet Take 5 mg by mouth Daily.    Court Vences MD   Salicylic Acid-Urea (KERASAL EX) Apply 1 application topically to the appropriate area as directed 2 (Two) Times a Day As Needed (dry feet). 11/23/19   Court Vences MD   tacrolimus (PROGRAF) 1 MG capsule Take 1 mg by mouth 2 (Two) Times a Day.    Court Vences MD       Allergies:     Zolpidem    Scheduled Meds:apixaban, 5 mg, Oral, Q12H  cefTRIAXone, 2,000 mg, Intravenous, Q24H  cholestyramine light, 1 packet, Oral, Daily  dilTIAZem, 90 mg, Oral, Q8H  DULoxetine, 40 mg, Oral, Daily  famotidine, 20 mg, Oral, Daily  ferrous sulfate, 324 mg, Oral, Daily With Breakfast  furosemide, 80 mg, Oral, BID  guaiFENesin, 1,200 mg, Oral, BID  levothyroxine, 50 mcg, Oral, Q AM  metoprolol succinate XL, 50 mg, Oral, Daily  potassium chloride, 40 mEq, Oral, Daily  predniSONE, 5 mg, Oral, Daily With  "Breakfast  sodium chloride, 10 mL, Intravenous, Q12H  tacrolimus, 1 mg, Oral, BID      Continuous Infusions:   PRN Meds:  acetaminophen    Calcium Replacement - Follow Nurse / BPA Driven Protocol    carboxymethylcellulose sod    diazePAM    Diclofenac Sodium    hydrALAZINE    HYDROcodone-acetaminophen    loperamide    Magnesium Standard Dose Replacement - Follow Nurse / BPA Driven Protocol    nitroglycerin    ondansetron ODT **OR** ondansetron    Phosphorus Replacement - Follow Nurse / BPA Driven Protocol    Potassium Replacement - Follow Nurse / BPA Driven Protocol    [COMPLETED] Insert Peripheral IV **AND** sodium chloride    sodium chloride    sodium chloride      OBJECTIVE    Vital Signs  Vitals:    05/25/24 0420 05/25/24 0438 05/25/24 0700 05/25/24 1100   BP: 105/51  123/58 131/57   BP Location: Left arm  Left arm Left arm   Patient Position: Lying  Lying Lying   Pulse: 56   74   Resp: 20  20 18   Temp: 98 °F (36.7 °C)  97.5 °F (36.4 °C) 97.5 °F (36.4 °C)   TempSrc: Oral  Oral Oral   SpO2: 97%  97% 95%   Weight:  85.1 kg (187 lb 9.8 oz)  84.6 kg (186 lb 8.2 oz)   Height:           Flowsheet Rows      Flowsheet Row First Filed Value   Admission Height 167.6 cm (66\") Documented at 05/24/2024 1143   Admission Weight 86.2 kg (190 lb) Documented at 05/24/2024 1143              Intake/Output Summary (Last 24 hours) at 5/25/2024 1542  Last data filed at 5/25/2024 1533  Gross per 24 hour   Intake 1540 ml   Output 1100 ml   Net 440 ml            Physical Exam:  General-no acute distress  Cardiovascular-S1-S2 normal, no murmur  Respiratory-clear to auscultation, no wheezing  GI-soft, nontender  No pedal edema        BNP        TROPONIN  Results from last 7 days   Lab Units 05/24/24  1408   HSTROP T ng/L 35*           ABG  Results from last 7 days   Lab Units 05/24/24  1225   PH, ARTERIAL pH units 7.442   PCO2, ARTERIAL mm Hg 51.4*   PO2 ART mm Hg 56.7*   O2 SATURATION ART % 89.5*   BASE EXCESS ART mmol/L 9.6* "         EKG  I personally viewed and interpreted the patient's EKG/Telemetry data:  ECG 12 Lead Dyspnea   Final Result   HEART RATE= 82  bpm   RR Interval= 732  ms   NJ Interval=   ms   P Horizontal Axis=   deg   P Front Axis=   deg   QRSD Interval= 88  ms   QT Interval= 407  ms   QTcB= 476  ms   QRS Axis= -32  deg   T Wave Axis= 49  deg   - ABNORMAL ECG -   Atrial fibrillation   Left axis deviation   When compared with ECG of 20-May-2024 9:37:54,   Significant rate increase   Significant repolarization change   Electronically Signed By: Praneeth Theodore (Derek) 25-May-2024 07:53:06   Date and Time of Study: 2024-05-24 11:36:06      Telemetry Scan   Final Result      Telemetry Scan   Final Result      Telemetry Scan   Final Result            Echocardiogram:    Results for orders placed during the hospital encounter of 05/17/24    Adult Transthoracic Echo Complete W/ Cont if Necessary Per Protocol    Interpretation Summary    Left ventricular systolic function is normal. Calculated left ventricular EF = 55% Left ventricular ejection fraction appears to be 51 - 55%.    Left ventricular diastolic function was indeterminate.    Left atrial volume is moderately increased.    The right atrial cavity is dilated.    Severe tricuspid valve regurgitation is present.    Estimated right ventricular systolic pressure from tricuspid regurgitation is markedly elevated (>55 mmHg).    Mild to moderate mitral valve regurgitation is present        Stress Test:        Cardiac Catheterization:  Results for orders placed during the hospital encounter of 08/11/22    Cardiac Catheterization/Vascular Study    Conclusion  IMPRESSIONS  Non obstructive CAD        Other:      ASSESSMENT & PLAN:    Acute exacerbation of heart failure with preserved ejection fraction  Pulmonary hypertension/severe tricuspid valve regurgitation  Continue IV diuresis with Lasix 80 mg twice daily, nephrology team following    History of atrial fibrillation  Elevated  [id number] score  Continue home Eliquis 5 mg twice daily  Rate controlled on diltiazem and metoprolol    ESRD status post  donor transplant  Nephrology team following  Continue prednisone and Prograf    Nonobstructive coronary artery disease, coronary angiogram performed in  with Dr. Gavin    Part of this note may be an electronic transcription/translation of spoken language to printed text using the Dragon Dictation System.    Keturah Wills MD  24  15:42 EDT

## 2024-05-25 NOTE — OUTREACH NOTE
Medical Week 1 Survey      Flowsheet Row Responses   [facility] facility patient discharged from? [facility]   Does the patient have one of the following disease processes/diagnoses(primary or secondary)? Other   Week 1 attempt successful? No   Unsuccessful attempts Attempt 1   Revoke Readmitted            AMBROSIO MARQUEZ - Registered Nurse

## 2024-05-25 NOTE — PLAN OF CARE
Problem: Adult Inpatient Plan of Care  Goal: Plan of Care Review  Outcome: Ongoing, Progressing  Flowsheets (Taken 5/25/2024 1141)  Plan of Care Reviewed With: patient  Goal: Patient-Specific Goal (Individualized)  Outcome: Ongoing, Progressing  Goal: Absence of Hospital-Acquired Illness or Injury  Outcome: Ongoing, Progressing  Intervention: Identify and Manage Fall Risk  Recent Flowsheet Documentation  Taken 5/25/2024 0952 by Genevieve Strange RN  Safety Promotion/Fall Prevention:   activity supervised   assistive device/personal items within reach   clutter free environment maintained   fall prevention program maintained   nonskid shoes/slippers when out of bed   room organization consistent   safety round/check completed  Taken 5/25/2024 0834 by Genevieve Strange RN  Safety Promotion/Fall Prevention:   activity supervised   assistive device/personal items within reach   clutter free environment maintained   fall prevention program maintained   nonskid shoes/slippers when out of bed   room organization consistent   safety round/check completed  Intervention: Prevent Skin Injury  Recent Flowsheet Documentation  Taken 5/25/2024 0952 by Genevieve Strange RN  Body Position:   position changed independently   supine   weight shifting  Taken 5/25/2024 0834 by Genevieve Strange RN  Body Position:   position changed independently   supine   weight shifting  Intervention: Prevent and Manage VTE (Venous Thromboembolism) Risk  Recent Flowsheet Documentation  Taken 5/25/2024 0952 by Genevieve Strange RN  Activity Management: activity encouraged  Taken 5/25/2024 0834 by Genevieve Strange RN  Activity Management: activity encouraged  Range of Motion: active ROM (range of motion) encouraged  Intervention: Prevent Infection  Recent Flowsheet Documentation  Taken 5/25/2024 0952 by Genevieve Strange RN  Infection Prevention: hand hygiene promoted  Taken 5/25/2024 0834 by Genevieve Strange RN  Infection Prevention: hand hygiene promoted  Goal:  Optimal Comfort and Wellbeing  Outcome: Ongoing, Progressing  Intervention: Monitor Pain and Promote Comfort  Recent Flowsheet Documentation  Taken 5/25/2024 1125 by Genevieve Strange RN  Pain Management Interventions: see MAR  Taken 5/25/2024 1100 by Genevieve Strange RN  Pain Management Interventions:   pillow support provided   position adjusted  Intervention: Provide Person-Centered Care  Recent Flowsheet Documentation  Taken 5/25/2024 0834 by Genevieve Strange RN  Trust Relationship/Rapport:   care explained   choices provided   empathic listening provided   emotional support provided   questions answered   questions encouraged   reassurance provided   thoughts/feelings acknowledged  Goal: Readiness for Transition of Care  Outcome: Ongoing, Progressing     Problem: Asthma Comorbidity  Goal: Maintenance of Asthma Control  Outcome: Ongoing, Progressing  Intervention: Maintain Asthma Symptom Control  Recent Flowsheet Documentation  Taken 5/25/2024 0952 by Genevieve Strange RN  Medication Review/Management: medications reviewed  Taken 5/25/2024 0834 by Genevieve Strange RN  Medication Review/Management: medications reviewed     Problem: Behavioral Health Comorbidity  Goal: Maintenance of Behavioral Health Symptom Control  Outcome: Ongoing, Progressing  Intervention: Maintain Behavioral Health Symptom Control  Recent Flowsheet Documentation  Taken 5/25/2024 0952 by Genevieve Strange RN  Medication Review/Management: medications reviewed  Taken 5/25/2024 0834 by Genevieve Strange RN  Medication Review/Management: medications reviewed     Problem: COPD (Chronic Obstructive Pulmonary Disease) Comorbidity  Goal: Maintenance of COPD Symptom Control  Outcome: Ongoing, Progressing  Intervention: Maintain COPD-Symptom Control  Recent Flowsheet Documentation  Taken 5/25/2024 0952 by Genevieve Strange RN  Medication Review/Management: medications reviewed  Taken 5/25/2024 0834 by Genevieve Strange RN  Supportive Measures:   active listening  utilized   verbalization of feelings encouraged  Medication Review/Management: medications reviewed     Problem: Diabetes Comorbidity  Goal: Blood Glucose Level Within Targeted Range  Outcome: Ongoing, Progressing     Problem: Heart Failure Comorbidity  Goal: Maintenance of Heart Failure Symptom Control  Outcome: Ongoing, Progressing  Intervention: Maintain Heart Failure-Management  Recent Flowsheet Documentation  Taken 5/25/2024 0952 by Genevieve Strange RN  Medication Review/Management: medications reviewed  Taken 5/25/2024 0834 by Genevieve Strange RN  Medication Review/Management: medications reviewed     Problem: Hypertension Comorbidity  Goal: Blood Pressure in Desired Range  Outcome: Ongoing, Progressing  Intervention: Maintain Blood Pressure Management  Recent Flowsheet Documentation  Taken 5/25/2024 0952 by Genevieve Strange RN  Medication Review/Management: medications reviewed  Taken 5/25/2024 0834 by Genevieve Strange RN  Medication Review/Management: medications reviewed     Problem: Obstructive Sleep Apnea Risk or Actual Comorbidity Management  Goal: Unobstructed Breathing During Sleep  Outcome: Ongoing, Progressing     Problem: Osteoarthritis Comorbidity  Goal: Maintenance of Osteoarthritis Symptom Control  Outcome: Ongoing, Progressing  Intervention: Maintain Osteoarthritis Symptom Control  Recent Flowsheet Documentation  Taken 5/25/2024 0952 by Genevieve Strange RN  Activity Management: activity encouraged  Medication Review/Management: medications reviewed  Taken 5/25/2024 0834 by Genevieve Strange RN  Activity Management: activity encouraged  Medication Review/Management: medications reviewed     Problem: Pain Chronic (Persistent) (Comorbidity Management)  Goal: Acceptable Pain Control and Functional Ability  Outcome: Ongoing, Progressing  Intervention: Manage Persistent Pain  Recent Flowsheet Documentation  Taken 5/25/2024 0952 by Genevieve Strange RN  Medication Review/Management: medications reviewed  Taken  5/25/2024 0834 by Genevieve Strange RN  Medication Review/Management: medications reviewed  Intervention: Develop Pain Management Plan  Recent Flowsheet Documentation  Taken 5/25/2024 1125 by Genevieve Strange RN  Pain Management Interventions: see MAR  Taken 5/25/2024 1100 by Genevieve Strange RN  Pain Management Interventions:   pillow support provided   position adjusted  Intervention: Optimize Psychosocial Wellbeing  Recent Flowsheet Documentation  Taken 5/25/2024 0834 by Genevieve Strange RN  Supportive Measures:   active listening utilized   verbalization of feelings encouraged  Diversional Activities: television  Family/Support System Care:   self-care encouraged   support provided     Problem: Seizure Disorder Comorbidity  Goal: Maintenance of Seizure Control  Outcome: Ongoing, Progressing  Intervention: Maintain Seizure-Symptom Control  Recent Flowsheet Documentation  Taken 5/25/2024 0834 by Genevieve Strange RN  Seizure Precautions: clutter-free environment maintained     Problem: Skin Injury Risk Increased  Goal: Skin Health and Integrity  Outcome: Ongoing, Progressing  Intervention: Optimize Skin Protection  Recent Flowsheet Documentation  Taken 5/25/2024 0952 by Genevieve Strange RN  Head of Bed (HOB) Positioning: HOB elevated  Taken 5/25/2024 0834 by Genevieve Strange RN  Pressure Reduction Techniques:   frequent weight shift encouraged   weight shift assistance provided  Head of Bed (HOB) Positioning: HOB elevated  Pressure Reduction Devices: pressure-redistributing mattress utilized     Problem: Fall Injury Risk  Goal: Absence of Fall and Fall-Related Injury  Outcome: Ongoing, Progressing  Intervention: Identify and Manage Contributors  Recent Flowsheet Documentation  Taken 5/25/2024 0952 by Genevieve Strange RN  Medication Review/Management: medications reviewed  Taken 5/25/2024 0834 by Genevieve Strange RN  Medication Review/Management: medications reviewed  Intervention: Promote Injury-Free Environment  Recent  Flowsheet Documentation  Taken 5/25/2024 0952 by Genevieve Strange, RN  Safety Promotion/Fall Prevention:   activity supervised   assistive device/personal items within reach   clutter free environment maintained   fall prevention program maintained   nonskid shoes/slippers when out of bed   room organization consistent   safety round/check completed  Taken 5/25/2024 0834 by Genevieve Strange, RN  Safety Promotion/Fall Prevention:   activity supervised   assistive device/personal items within reach   clutter free environment maintained   fall prevention program maintained   nonskid shoes/slippers when out of bed   room organization consistent   safety round/check completed   Goal Outcome Evaluation:  Plan of Care Reviewed With: patient

## 2024-05-26 LAB
ANION GAP SERPL CALCULATED.3IONS-SCNC: 7.7 MMOL/L (ref 5–15)
BASOPHILS # BLD AUTO: 0.02 10*3/MM3 (ref 0–0.2)
BASOPHILS NFR BLD AUTO: 0.4 % (ref 0–1.5)
BUN SERPL-MCNC: 25 MG/DL (ref 8–23)
BUN/CREAT SERPL: 22.7 (ref 7–25)
CALCIUM SPEC-SCNC: 9.6 MG/DL (ref 8.6–10.5)
CHLORIDE SERPL-SCNC: 98 MMOL/L (ref 98–107)
CO2 SERPL-SCNC: 36.3 MMOL/L (ref 22–29)
CREAT SERPL-MCNC: 1.1 MG/DL (ref 0.57–1)
DEPRECATED RDW RBC AUTO: 61.9 FL (ref 37–54)
EGFRCR SERPLBLD CKD-EPI 2021: 51.9 ML/MIN/1.73
EOSINOPHIL # BLD AUTO: 0.12 10*3/MM3 (ref 0–0.4)
EOSINOPHIL NFR BLD AUTO: 2.3 % (ref 0.3–6.2)
ERYTHROCYTE [DISTWIDTH] IN BLOOD BY AUTOMATED COUNT: 17.1 % (ref 12.3–15.4)
FERRITIN SERPL-MCNC: 272 NG/ML (ref 13–150)
GLUCOSE SERPL-MCNC: 91 MG/DL (ref 65–99)
HCT VFR BLD AUTO: 31 % (ref 34–46.6)
HGB BLD-MCNC: 8.9 G/DL (ref 12–15.9)
IMM GRANULOCYTES # BLD AUTO: 0.02 10*3/MM3 (ref 0–0.05)
IMM GRANULOCYTES NFR BLD AUTO: 0.4 % (ref 0–0.5)
IRON 24H UR-MRATE: 40 MCG/DL (ref 37–145)
IRON SATN MFR SERPL: 13 % (ref 20–50)
LYMPHOCYTES # BLD AUTO: 0.64 10*3/MM3 (ref 0.7–3.1)
LYMPHOCYTES NFR BLD AUTO: 12.2 % (ref 19.6–45.3)
MAGNESIUM SERPL-MCNC: 1.8 MG/DL (ref 1.6–2.4)
MCH RBC QN AUTO: 28.6 PG (ref 26.6–33)
MCHC RBC AUTO-ENTMCNC: 28.7 G/DL (ref 31.5–35.7)
MCV RBC AUTO: 99.7 FL (ref 79–97)
MONOCYTES # BLD AUTO: 0.56 10*3/MM3 (ref 0.1–0.9)
MONOCYTES NFR BLD AUTO: 10.7 % (ref 5–12)
NEUTROPHILS NFR BLD AUTO: 3.89 10*3/MM3 (ref 1.7–7)
NEUTROPHILS NFR BLD AUTO: 74 % (ref 42.7–76)
NRBC BLD AUTO-RTO: 0 /100 WBC (ref 0–0.2)
PLATELET # BLD AUTO: 143 10*3/MM3 (ref 140–450)
PMV BLD AUTO: 9.7 FL (ref 6–12)
POTASSIUM SERPL-SCNC: 4.2 MMOL/L (ref 3.5–5.2)
RBC # BLD AUTO: 3.11 10*6/MM3 (ref 3.77–5.28)
SODIUM SERPL-SCNC: 142 MMOL/L (ref 136–145)
TIBC SERPL-MCNC: 304 MCG/DL (ref 298–536)
TRANSFERRIN SERPL-MCNC: 204 MG/DL (ref 200–360)
WBC NRBC COR # BLD AUTO: 5.25 10*3/MM3 (ref 3.4–10.8)

## 2024-05-26 PROCEDURE — 84466 ASSAY OF TRANSFERRIN: CPT | Performed by: INTERNAL MEDICINE

## 2024-05-26 PROCEDURE — 63710000001 PREDNISONE PER 5 MG: Performed by: INTERNAL MEDICINE

## 2024-05-26 PROCEDURE — 99233 SBSQ HOSP IP/OBS HIGH 50: CPT | Performed by: STUDENT IN AN ORGANIZED HEALTH CARE EDUCATION/TRAINING PROGRAM

## 2024-05-26 PROCEDURE — 83540 ASSAY OF IRON: CPT | Performed by: INTERNAL MEDICINE

## 2024-05-26 PROCEDURE — 82728 ASSAY OF FERRITIN: CPT | Performed by: INTERNAL MEDICINE

## 2024-05-26 PROCEDURE — 25810000003 SODIUM CHLORIDE 0.9 % SOLUTION: Performed by: INTERNAL MEDICINE

## 2024-05-26 PROCEDURE — 97166 OT EVAL MOD COMPLEX 45 MIN: CPT

## 2024-05-26 PROCEDURE — 25010000002 CEFTRIAXONE PER 250 MG: Performed by: INTERNAL MEDICINE

## 2024-05-26 PROCEDURE — 63710000001 ONDANSETRON ODT 4 MG TABLET DISPERSIBLE: Performed by: NURSE PRACTITIONER

## 2024-05-26 PROCEDURE — 83735 ASSAY OF MAGNESIUM: CPT | Performed by: INTERNAL MEDICINE

## 2024-05-26 PROCEDURE — 63710000001 TACROLIMUS PER 1 MG: Performed by: INTERNAL MEDICINE

## 2024-05-26 PROCEDURE — 80048 BASIC METABOLIC PNL TOTAL CA: CPT | Performed by: INTERNAL MEDICINE

## 2024-05-26 PROCEDURE — 85025 COMPLETE CBC W/AUTO DIFF WBC: CPT | Performed by: INTERNAL MEDICINE

## 2024-05-26 PROCEDURE — 97162 PT EVAL MOD COMPLEX 30 MIN: CPT

## 2024-05-26 PROCEDURE — 25010000002 NA FERRIC GLUC CPLX PER 12.5 MG: Performed by: INTERNAL MEDICINE

## 2024-05-26 RX ADMIN — DILTIAZEM HYDROCHLORIDE 90 MG: 60 TABLET, FILM COATED ORAL at 15:25

## 2024-05-26 RX ADMIN — METOPROLOL SUCCINATE 50 MG: 50 TABLET, EXTENDED RELEASE ORAL at 08:38

## 2024-05-26 RX ADMIN — DIAZEPAM 5 MG: 5 TABLET ORAL at 16:46

## 2024-05-26 RX ADMIN — FUROSEMIDE 80 MG: 40 TABLET ORAL at 08:38

## 2024-05-26 RX ADMIN — PREDNISONE 5 MG: 5 TABLET ORAL at 08:38

## 2024-05-26 RX ADMIN — APIXABAN 5 MG: 5 TABLET, FILM COATED ORAL at 08:38

## 2024-05-26 RX ADMIN — FERROUS SULFATE TAB EC 324 MG (65 MG FE EQUIVALENT) 324 MG: 324 (65 FE) TABLET DELAYED RESPONSE at 08:38

## 2024-05-26 RX ADMIN — Medication 10 ML: at 20:50

## 2024-05-26 RX ADMIN — LEVOTHYROXINE SODIUM 50 MCG: 0.05 TABLET ORAL at 04:57

## 2024-05-26 RX ADMIN — SODIUM CHLORIDE 250 MG: 9 INJECTION, SOLUTION INTRAVENOUS at 16:15

## 2024-05-26 RX ADMIN — POTASSIUM CHLORIDE 40 MEQ: 1500 TABLET, EXTENDED RELEASE ORAL at 08:38

## 2024-05-26 RX ADMIN — TACROLIMUS 1 MG: 1 CAPSULE ORAL at 08:38

## 2024-05-26 RX ADMIN — CEFTRIAXONE SODIUM 2000 MG: 2 INJECTION, POWDER, FOR SOLUTION INTRAMUSCULAR; INTRAVENOUS at 15:24

## 2024-05-26 RX ADMIN — FUROSEMIDE 80 MG: 40 TABLET ORAL at 16:47

## 2024-05-26 RX ADMIN — APIXABAN 5 MG: 5 TABLET, FILM COATED ORAL at 20:49

## 2024-05-26 RX ADMIN — DULOXETINE HYDROCHLORIDE 40 MG: 20 CAPSULE, DELAYED RELEASE ORAL at 08:38

## 2024-05-26 RX ADMIN — DILTIAZEM HYDROCHLORIDE 90 MG: 60 TABLET, FILM COATED ORAL at 23:35

## 2024-05-26 RX ADMIN — HYDROCODONE BITARTRATE AND ACETAMINOPHEN 1 TABLET: 5; 325 TABLET ORAL at 16:46

## 2024-05-26 RX ADMIN — DILTIAZEM HYDROCHLORIDE 90 MG: 60 TABLET, FILM COATED ORAL at 04:57

## 2024-05-26 RX ADMIN — FAMOTIDINE 20 MG: 20 TABLET, FILM COATED ORAL at 08:38

## 2024-05-26 RX ADMIN — CHOLESTYRAMINE 4 G: 4 POWDER, FOR SUSPENSION ORAL at 08:38

## 2024-05-26 RX ADMIN — ONDANSETRON 4 MG: 4 TABLET, ORALLY DISINTEGRATING ORAL at 16:46

## 2024-05-26 RX ADMIN — GUAIFENESIN 1200 MG: 600 TABLET, EXTENDED RELEASE ORAL at 20:49

## 2024-05-26 RX ADMIN — TACROLIMUS 1 MG: 1 CAPSULE ORAL at 20:49

## 2024-05-26 RX ADMIN — GUAIFENESIN 1200 MG: 600 TABLET, EXTENDED RELEASE ORAL at 08:38

## 2024-05-26 RX ADMIN — Medication 10 ML: at 08:39

## 2024-05-26 NOTE — PLAN OF CARE
Goal Outcome Evaluation:   Patient resting in bed.  She has been getting up to BSC today with little difficulty.  Will continue to monitor patient.

## 2024-05-26 NOTE — PROGRESS NOTES
"PULMONARY CRITICAL CARE PROGRESS  NOTE      PATIENT IDENTIFICATION:  Name: Jaja Carcamo  MRN: [id number]  :  1945     Age: 79 y.o.  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      Objective:  tMax 24 hrs: Temp (24hrs), Av °F (36.7 °C), Min:97.4 °F (36.3 °C), Max:99 °F (37.2 °C)      Vitals Ranges:   Temp:  [97.4 °F (36.3 °C)-99 °F (37.2 °C)] 98 °F (36.7 °C)  Heart Rate:  [57-80] 77  Resp:  [16-25] 23  BP: (113-144)/(42-67) 144/66    Intake and Output Last 3 Shifts:   I/O last 3 completed shifts:  In: 1780 [P.O.:1680; IV Piggyback:100]  Out: 1700 [Urine:1700]    Exam:  /66 (BP Location: Left arm, Patient Position: Lying)   Pulse 77   Temp 98 °F (36.7 °C) (Oral)   Resp 23   Ht 167.6 cm (66\")   Wt 84.6 kg (186 lb 8.2 oz)   SpO2 96%   BMI 30.10 kg/m²     General Appearance: Alert awake not in distress  HEENT:  Normocephalic, without obvious abnormality. Conjunctivae/corneas clear.  Normal external ear canals. Nares normal, no drainage     Neck:  Supple, symmetrical, trachea midline. No JVD.  Lungs /Chest wall:   Bilateral basal rhonchi, respirations unlabored, symmetrical wall movement.     Heart:  Regular rate and rhythm, systolic murmur PMI left sternal border  Abdomen: Soft, nontender, no masses, no organomegaly.    Extremities: Trace edema, no clubbing or cyanosis        Medications:  apixaban, 5 mg, Oral, Q12H  cefTRIAXone, 2,000 mg, Intravenous, Q24H  cholestyramine light, 1 packet, Oral, Daily  dilTIAZem, 90 mg, Oral, Q8H  DULoxetine, 40 mg, Oral, Daily  famotidine, 20 mg, Oral, Daily  ferric gluconate, 250 mg, Intravenous, Once  ferrous sulfate, 324 mg, Oral, Daily With Breakfast  furosemide, 80 mg, Oral, BID  guaiFENesin, 1,200 mg, Oral, BID  levothyroxine, 50 mcg, " Oral, Q AM  metoprolol succinate XL, 50 mg, Oral, Daily  potassium chloride, 40 mEq, Oral, Daily  predniSONE, 5 mg, Oral, Daily With Breakfast  sodium chloride, 10 mL, Intravenous, Q12H  tacrolimus, 1 mg, Oral, BID        Infusion:          PRN:    acetaminophen    Calcium Replacement - Follow Nurse / BPA Driven Protocol    carboxymethylcellulose sod    diazePAM    Diclofenac Sodium    hydrALAZINE    HYDROcodone-acetaminophen    loperamide    Magnesium Standard Dose Replacement - Follow Nurse / BPA Driven Protocol    nitroglycerin    ondansetron ODT **OR** ondansetron    Phosphorus Replacement - Follow Nurse / BPA Driven Protocol    Potassium Replacement - Follow Nurse / BPA Driven Protocol    [COMPLETED] Insert Peripheral IV **AND** sodium chloride    sodium chloride    sodium chloride  Data Review:  All labs (24hrs):   Recent Results (from the past 24 hour(s))   Basic Metabolic Panel    Collection Time: 05/26/24  4:04 AM    Specimen: Blood   Result Value Ref Range    Glucose 91 65 - 99 mg/dL    BUN 25 (H) 8 - 23 mg/dL    Creatinine 1.10 (H) 0.57 - 1.00 mg/dL    Sodium 142 136 - 145 mmol/L    Potassium 4.2 3.5 - 5.2 mmol/L    Chloride 98 98 - 107 mmol/L    CO2 36.3 (H) 22.0 - 29.0 mmol/L    Calcium 9.6 8.6 - 10.5 mg/dL    BUN/Creatinine Ratio 22.7 7.0 - 25.0    Anion Gap 7.7 5.0 - 15.0 mmol/L    eGFR 51.9 (L) >60.0 mL/min/1.73   Iron Profile    Collection Time: 05/26/24  4:04 AM    Specimen: Blood   Result Value Ref Range    Iron 40 37 - 145 mcg/dL    Iron Saturation (TSAT) 13 (L) 20 - 50 %    Transferrin 204 200 - 360 mg/dL    TIBC 304 298 - 536 mcg/dL   Ferritin    Collection Time: 05/26/24  4:04 AM    Specimen: Blood   Result Value Ref Range    Ferritin 272.00 (H) 13.00 - 150.00 ng/mL   Magnesium    Collection Time: 05/26/24  4:04 AM    Specimen: Blood   Result Value Ref Range    Magnesium 1.8 1.6 - 2.4 mg/dL   CBC Auto Differential    Collection Time: 05/26/24  4:04 AM    Specimen: Blood   Result Value Ref  Range    WBC 5.25 3.40 - 10.80 10*3/mm3    RBC 3.11 (L) 3.77 - 5.28 10*6/mm3    Hemoglobin 8.9 (L) 12.0 - 15.9 g/dL    Hematocrit 31.0 (L) 34.0 - 46.6 %    MCV 99.7 (H) 79.0 - 97.0 fL    MCH 28.6 26.6 - 33.0 pg    MCHC 28.7 (L) 31.5 - 35.7 g/dL    RDW 17.1 (H) 12.3 - 15.4 %    RDW-SD 61.9 (H) 37.0 - 54.0 fl    MPV 9.7 6.0 - 12.0 fL    Platelets 143 140 - 450 10*3/mm3    Neutrophil % 74.0 42.7 - 76.0 %    Lymphocyte % 12.2 (L) 19.6 - 45.3 %    Monocyte % 10.7 5.0 - 12.0 %    Eosinophil % 2.3 0.3 - 6.2 %    Basophil % 0.4 0.0 - 1.5 %    Immature Grans % 0.4 0.0 - 0.5 %    Neutrophils, Absolute 3.89 1.70 - 7.00 10*3/mm3    Lymphocytes, Absolute 0.64 (L) 0.70 - 3.10 10*3/mm3    Monocytes, Absolute 0.56 0.10 - 0.90 10*3/mm3    Eosinophils, Absolute 0.12 0.00 - 0.40 10*3/mm3    Basophils, Absolute 0.02 0.00 - 0.20 10*3/mm3    Immature Grans, Absolute 0.02 0.00 - 0.05 10*3/mm3    nRBC 0.0 0.0 - 0.2 /100 WBC        Imaging:  XR Chest 1 View  Narrative: XR CHEST 1 VW    Date of Exam: 5/24/2024 12:15 PM EDT    Indication: SOA    Comparison: 5/22/2024    Findings:  Cardiomegaly. Pulmonary vasculature is mildly prominent. Lungs appear hyperinflated. Small bilateral pleural effusions, with fluid tracking in the right major fissure. Atelectasis in the left lung base  Impression: Impression:    1. Cardiomegaly with pulmonary vascular congestion and small pleural effusions  2. 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    Barry Mckinley MD   5/26/2024  12:25 EDT

## 2024-05-26 NOTE — PLAN OF CARE
Goal Outcome Evaluation:              Outcome Evaluation: 79 y.o. female with a h/o chronic HFpEF, atrial fibrillation, COPD, non-obstructive CAD, asthma, HTN, kidney transplant and lung cancer who presented to the ER with shortness of breath and weakness.  She was also noted to have bilateral lower extremity edema.  Patient was recently hospitalized for UTI and A fib with RVR.  She was discharged on 5/22/24, and she refused discharge to a SNF as recommended.  She also refused home health and outpatient PT.  Pt states generally she is independent with mobility, ambulation with RWx and has had recent falls.  This date pt fatigues quickly with activity and O2 sats drop.  She tremors with fatigue.  She is not safe to return home and will require SNF at discharge.      Anticipated Discharge Disposition (OT): skilled nursing facility

## 2024-05-26 NOTE — PROGRESS NOTES
: Patient feels little better today denies any complaints no acute distress  The patient in no acute distress    Vital Signs  Vitals:    05/26/24 1100   BP: 144/66   Pulse: 77   Resp: 23   Temp: 98 °F (36.7 °C)   SpO2: 96%     No intake/output data recorded.  I/O last 3 completed shifts:  In: 1780 [P.O.:1680; IV Piggyback:100]  Out: 1700 [Urine:1700]      Physical Exam:    General: Sitting in chair in no acute distress  HEENT:  Normocephalic, Atraumatic, EOMI, PERRLA, NO icterus,  Neck:  Supple, No JVD, No Carotid artery bruit, No lymphadenopathy  Chest: Clear to auscultation bilaterally,   Assessment and plan:  Cardiovascular: Regular rate and rhythm. Positive S1 & S2, No rub, murmur or gallop.  Abdominal: Soft, nontender, no palpable organomegaly, no abdominal bruit, positive bowel sounds  Musculoskeletal: No joint tenderness or swelling, good range of motion, Adequate muscle strength on both sides, no cyanosis, clubbing or edema on lower extremities.  Neuro: Alert oriented x3, CN1 - 12 intact, No focal sensory or motor deficit.      Review of Systems:   CNS: Denied headaches, blurred vision, tingling or numbness in any part of body. No weakness or any impaired speech.  CVS: No chest pain or shortness of breath, No orthopnea or PND or exertional dyspnea,  Pulmonary: Denies shortness of breath, coughs, hematemesis, night sweats or weight loss.  GI: Denies diarrhea, nausea, vomiting. Denies weight loss, hematemesis, melena.  : Denies dysuria, frequency or hesitancy of urination  Vascular: Denies claudication, resting pain, tingling numbness or weakness in any part of the body.  Musculoskeletal: Denies Joint tenderness or pain, denies stiffness in joints, denies fever or weight loss, or skin rashes.    Current Labs  [unfilled]  Lab Results (last 24 hours)       Procedure Component Value Units Date/Time    Basic Metabolic Panel [[id number]]  (Abnormal) Collected: 05/26/24 0404    Specimen: Blood Updated:  05/26/24 0553     Glucose 91 mg/dL      BUN 25 mg/dL      Creatinine 1.10 mg/dL      Sodium 142 mmol/L      Potassium 4.2 mmol/L      Chloride 98 mmol/L      CO2 36.3 mmol/L      Calcium 9.6 mg/dL      BUN/Creatinine Ratio 22.7     Anion Gap 7.7 mmol/L      eGFR 51.9 mL/min/1.73     Narrative:      GFR Normal >60  Chronic Kidney Disease <60  Kidney Failure <15    The GFR formula is only valid for adults with stable renal function between ages 18 and 70.    Iron Profile [[id number]]  (Abnormal) Collected: 05/26/24 0404    Specimen: Blood Updated: 05/26/24 0553     Iron 40 mcg/dL      Iron Saturation (TSAT) 13 %      Transferrin 204 mg/dL      TIBC 304 mcg/dL     Ferritin [[id number]]  (Abnormal) Collected: 05/26/24 0404    Specimen: Blood Updated: 05/26/24 0553     Ferritin 272.00 ng/mL     Narrative:      Results may be falsely decreased if patient taking Biotin.      Magnesium [[id number]]  (Normal) Collected: 05/26/24 0404    Specimen: Blood Updated: 05/26/24 0553     Magnesium 1.8 mg/dL     CBC & Differential [[id number]]  (Abnormal) Collected: 05/26/24 0404    Specimen: Blood Updated: 05/26/24 0540    Narrative:      The following orders were created for panel order CBC & Differential.  Procedure                               Abnormality         Status                     ---------                               -----------         ------                     CBC Auto Differential[[id number]]        Abnormal            Final result                 Please view results for these tests on the individual orders.    CBC Auto Differential [[id number]]  (Abnormal) Collected: 05/26/24 0404    Specimen: Blood Updated: 05/26/24 0540     WBC 5.25 10*3/mm3      RBC 3.11 10*6/mm3      Hemoglobin 8.9 g/dL      Hematocrit 31.0 %      MCV 99.7 fL      MCH 28.6 pg      MCHC 28.7 g/dL      RDW 17.1 %      RDW-SD 61.9 fl      MPV 9.7 fL      Platelets 143 10*3/mm3      Neutrophil % 74.0 %      Lymphocyte % 12.2 %      Monocyte % 10.7 %       Eosinophil % 2.3 %      Basophil % 0.4 %      Immature Grans % 0.4 %      Neutrophils, Absolute 3.89 10*3/mm3      Lymphocytes, Absolute 0.64 10*3/mm3      Monocytes, Absolute 0.56 10*3/mm3      Eosinophils, Absolute 0.12 10*3/mm3      Basophils, Absolute 0.02 10*3/mm3      Immature Grans, Absolute 0.02 10*3/mm3      nRBC 0.0 /100 WBC     Protein / Creatinine Ratio, Urine - Urine, Clean Catch [[id number]] Collected: 05/25/24 1104    Specimen: Urine, Clean Catch Updated: 05/25/24 1739     Protein/Creatinine Ratio, Urine 98.7 mg/G Crea      Creatinine, Urine 135.7 mg/dL      Total Protein, Urine 13.4 mg/dL     Blood Culture - Blood, Arm, Left [[id number]]  (Normal) Collected: 05/24/24 1311    Specimen: Blood from Arm, Left Updated: 05/25/24 1317     Blood Culture No growth at 24 hours    Blood Culture - Blood, Arm, Left [[id number]]  (Normal) Collected: 05/24/24 1251    Specimen: Blood from Arm, Left Updated: 05/25/24 1302     Blood Culture No growth at 24 hours    Urinalysis, Microscopic Only - Urine, Clean Catch [[id number]]  (Abnormal) Collected: 05/25/24 1104    Specimen: Urine, Clean Catch Updated: 05/25/24 1230     RBC, UA None Seen /HPF      WBC, UA 3-5 /HPF      Bacteria, UA None Seen /HPF      Squamous Epithelial Cells, UA 3-6 /HPF      Yeast, UA       Small/1+ Budding Yeast w/Hyphae     /HPF     Hyaline Casts, UA 7-12 /LPF      Methodology Manual Light Microscopy          Current Radiology  Imaging Results (Last 24 Hours)       ** No results found for the last 24 hours. **          Current Meds    Current Facility-Administered Medications:     acetaminophen (TYLENOL) tablet 650 mg, 650 mg, Oral, Q4H Suzi MENDOZA Donna M, APRN    apixaban (ELIQUIS) tablet 5 mg, 5 mg, Oral, Q12H, Juanis Lowe MD, 5 mg at 05/26/24 0838    Calcium Replacement - Follow Nurse / BPA Driven Protocol, , Does not apply, PRN, [place], Janene M, APRN    carboxymethylcellulose sod 1 % eye gel 1 drop, 1 drop, Both Eyes, TID PRN, Mynor  MD Juanis    cefTRIAXone (ROCEPHIN) 2,000 mg in sodium chloride 0.9 % 100 mL MBP, 2,000 mg, Intravenous, Q24H, Barry Mckinley MD, Last Rate: 200 mL/hr at 05/25/24 1533, 2,000 mg at 05/25/24 1533    cholestyramine light packet 4 g, 1 packet, Oral, Daily, Juanis Lowe MD, 4 g at 05/26/24 0838    diazePAM (VALIUM) tablet 5 mg, 5 mg, Oral, BID PRN, Juanis Lowe MD, 5 mg at 05/24/24 2113    Diclofenac Sodium (VOLTAREN) 1 % gel 4 g, 4 g, Topical, BID PRN, Juanis Lowe MD    dilTIAZem (CARDIZEM) tablet 90 mg, 90 mg, Oral, Q8H, Juanis Lowe MD, 90 mg at 05/26/24 0457    DULoxetine (CYMBALTA) DR capsule 40 mg, 40 mg, Oral, Daily, Juains Lowe MD, 40 mg at 05/26/24 0838    famotidine (PEPCID) tablet 20 mg, 20 mg, Oral, Daily, Juanis Lowe MD, 20 mg at 05/26/24 0838    ferrous sulfate EC tablet 324 mg, 324 mg, Oral, Daily With Breakfast, Juanis Lowe MD, 324 mg at 05/26/24 0838    furosemide (LASIX) tablet 80 mg, 80 mg, Oral, BID, Alexandro Landry MD, 80 mg at 05/26/24 0838    guaiFENesin (MUCINEX) 12 hr tablet 1,200 mg, 1,200 mg, Oral, BID, Juanis Lowe MD, 1,200 mg at 05/26/24 0838    hydrALAZINE (APRESOLINE) injection 10 mg, 10 mg, Intravenous, Q6H PRN, Janene Archer APRN    HYDROcodone-acetaminophen (NORCO) 5-325 MG per tablet 1 tablet, 1 tablet, Oral, Q8H PRN, Juanis Lowe MD, 1 tablet at 05/25/24 2034    levothyroxine (SYNTHROID, LEVOTHROID) tablet 50 mcg, 50 mcg, Oral, Q AM, Juanis Lowe MD, 50 mcg at 05/26/24 0457    loperamide (IMODIUM) capsule 2 mg, 2 mg, Oral, 4x Daily PRN, Juanis Lowe MD    Magnesium Standard Dose Replacement - Follow Nurse / BPA Driven Protocol, , Does not apply, PRN, Janene Archer APRN    metoprolol succinate XL (TOPROL-XL) 24 hr tablet 50 mg, 50 mg, Oral, Daily, Juanis Lowe MD, 50 mg at 05/26/24 0838    nitroglycerin (NITROSTAT) SL tablet 0.4 mg, 0.4 mg, Sublingual, Q5 Min PRN, Janene Archer APRN    ondansetron ODT (ZOFRAN-ODT) disintegrating tablet 4 mg, 4 mg, Oral, Q6H PRN **OR**  ondansetron (ZOFRAN) injection 4 mg, 4 mg, Intravenous, Q6H PRN, Janene Archer, APRN, 4 mg at 24 1101    Phosphorus Replacement - Follow Nurse / BPA Driven Protocol, , Does not apply, PRN, [place] Janene M, APRN    potassium chloride (KLOR-CON M20) CR tablet 40 mEq, 40 mEq, Oral, Daily, Juanis Lowe MD, 40 mEq at 24 0838    Potassium Replacement - Follow Nurse / BPA Driven Protocol, , Does not apply, PRN, [place] M, APRN    predniSONE (DELTASONE) tablet 5 mg, 5 mg, Oral, Daily With Breakfast, Juanis Lowe MD, 5 mg at 24 0838    [COMPLETED] Insert Peripheral IV, , , Once **AND** sodium chloride 0.9 % flush 10 mL, 10 mL, Intravenous, PRN, Mila Theodore, APRN    sodium chloride 0.9 % flush 10 mL, 10 mL, Intravenous, Q12H, Janene Archer, APRN, 10 mL at 24 0839    sodium chloride 0.9 % flush 10 mL, 10 mL, Intravenous, PRN, Anders Archerna M, APRN    sodium chloride 0.9 % infusion 40 mL, 40 mL, Intravenous, PRN, Anders Archerna M, APRN    tacrolimus (PROGRAF) capsule 1 mg, 1 mg, Oral, BID, Juanis Lowe MD, 1 mg at 24 0838        Assessment and plan:   donor kidney transplant is status post  donor kidney transplant, continue immunosuppression at current dose        Acute exacerbation of CHF (congestive heart failure)    Anemia with iron deficiency started on IV iron therapy  Congestive heart failure continue current diuretics patient counseled on avoiding processed food and observing low-sodium diet        Alexandro Landry MD FACP, FASN.  24  12:14 EDT

## 2024-05-26 NOTE — PROGRESS NOTES
LOS: 2 days   Patient Care Team:  Jonathan Luna APRN as PCP - General (Family Medicine)  Jak Gavin MD as Cardiologist (Cardiology)    Subjective:  Some improvement noted    Objective:   afebrile      Review of Systems:   Review of Systems   Constitutional:  Positive for activity change.   Respiratory:  Positive for shortness of breath.            Vital Signs  Temp:  [97.4 °F (36.3 °C)-99 °F (37.2 °C)] 97.9 °F (36.6 °C)  Heart Rate:  [57-80] 61  Resp:  [16-25] 19  BP: (113-134)/(42-67) 117/42    Physical Exam:  Physical Exam  Vitals and nursing note reviewed.   Constitutional:       Appearance: Normal appearance.   Cardiovascular:      Rate and Rhythm: Rhythm irregular.      Heart sounds: Normal heart sounds.   Pulmonary:      Breath sounds: Normal breath sounds.   Neurological:      Mental Status: She is alert.          Radiology:  XR Chest 1 View    Result Date: 5/24/2024  Impression: 1. Cardiomegaly with pulmonary vascular congestion and small pleural effusions 2. Probable COPD with left basilar atelectasis Electronically Signed: Ricardo Chavarria  5/24/2024 12:38 PM EDT  Workstation ID: [id number]    XR Chest 1 View    Result Date: 5/22/2024  Impression: 1.Coarse bilateral reticular markings, which could reflect interstitial pulmonary edema or atypical pneumonia. 2.Cardiomegaly with small right pleural effusion. Electronically Signed: Naun Bills MD  5/22/2024 8:10 AM EDT  Workstation ID: [id number]    CT Abdomen Pelvis Stone Protocol    Result Date: 5/21/2024  Impression: 1. No acute abnormality in the abdomen or pelvis. 2. Transplant kidney in the right lower quadrant without hydronephrosis or obstructive uropathy. Native kidneys are severely atrophic with bilateral nonobstructing nephrolithiasis. 3. Cholelithiasis. 4. Cardiomegaly with basilar interstitial pulmonary edema and small bilateral pleural effusions. 5. Additional incidental findings above. Electronically Signed: Franck Cortez MD  5/21/2024  3:47 PM EDT  Workstation ID: [id number]    CT Chest Without Contrast Diagnostic    Result Date: 5/19/2024  Minimal interval enlargement of the tissue surrounding the postsurgical changes associated with small bilateral pleural effusions. Enlargement of the main pulmonary artery suggesting pulmonary arterial hypertension. Electronically Signed: Adalid Silverio MD  5/19/2024 7:58 PM EDT  Workstation ID: [id number]        Results Review:     I reviewed the patient's new clinical results.  I reviewed the patient's new imaging results and agree with the interpretation.    Medication Review:   Scheduled Meds:apixaban, 5 mg, Oral, Q12H  cefTRIAXone, 2,000 mg, Intravenous, Q24H  cholestyramine light, 1 packet, Oral, Daily  dilTIAZem, 90 mg, Oral, Q8H  DULoxetine, 40 mg, Oral, Daily  famotidine, 20 mg, Oral, Daily  ferrous sulfate, 324 mg, Oral, Daily With Breakfast  furosemide, 80 mg, Oral, BID  guaiFENesin, 1,200 mg, Oral, BID  levothyroxine, 50 mcg, Oral, Q AM  metoprolol succinate XL, 50 mg, Oral, Daily  potassium chloride, 40 mEq, Oral, Daily  predniSONE, 5 mg, Oral, Daily With Breakfast  sodium chloride, 10 mL, Intravenous, Q12H  tacrolimus, 1 mg, Oral, BID      Continuous Infusions:   PRN Meds:.  acetaminophen    Calcium Replacement - Follow Nurse / BPA Driven Protocol    carboxymethylcellulose sod    diazePAM    Diclofenac Sodium    hydrALAZINE    HYDROcodone-acetaminophen    loperamide    Magnesium Standard Dose Replacement - Follow Nurse / BPA Driven Protocol    nitroglycerin    ondansetron ODT **OR** ondansetron    Phosphorus Replacement - Follow Nurse / BPA Driven Protocol    Potassium Replacement - Follow Nurse / BPA Driven Protocol    [COMPLETED] Insert Peripheral IV **AND** sodium chloride    sodium chloride    sodium chloride    Labs:    CBC    Results from last 7 days   Lab Units 05/26/24  0404 05/25/24  0443 05/24/24  1210 05/22/24  0305 05/21/24  0145 05/20/24  0337   WBC 10*3/mm3 5.25 5.75 7.75 6.64 6.64 6.18  "  HEMOGLOBIN g/dL 8.9* 9.6* 9.6* 10.0* 10.0* 9.2*   PLATELETS 10*3/mm3 143 143 135* 131* 121* 120*     BMP   Results from last 7 days   Lab Units 05/26/24  0404 05/25/24  0443 05/24/24  1408 05/24/24  1210 05/22/24  0305 05/21/24  1603 05/21/24  0145 05/20/24  0337   SODIUM mmol/L 142 141  --  137 142  --  142 142   POTASSIUM mmol/L 4.2 4.0  --  4.0 3.6 4.4 3.4* 3.8   CHLORIDE mmol/L 98 97*  --  94* 99  --  101 104   CO2 mmol/L 36.3* 36.0*  --  32.9* 35.0*  --  32.0* 28.8   BUN mg/dL 25* 24*  --  21 16  --  16 15   CREATININE mg/dL 1.10* 1.16*  --  1.16* 0.76  --  0.88 0.74   GLUCOSE mg/dL 91 104*  --  154* 101*  --  111* 113*   MAGNESIUM mg/dL 1.8 2.1 1.8  --  1.5*  --   --   --    PHOSPHORUS mg/dL  --  4.3  --   --   --   --   --   --      Cr Clearance Estimated Creatinine Clearance: 46.9 mL/min (A) (by C-G formula based on SCr of 1.1 mg/dL (H)).  Coag   Results from last 7 days   Lab Units 05/24/24  1210   INR  1.16*   APTT seconds 30.9*     HbA1C No results found for: \"HGBA1C\"  Blood Glucose No results found for: \"POCGLU\"  Infection   Results from last 7 days   Lab Units 05/24/24  1311 05/24/24  1251   BLOODCX  No growth at 24 hours No growth at 24 hours     CMP   Results from last 7 days   Lab Units 05/26/24  0404 05/25/24  0443 05/24/24  1210 05/22/24  0305 05/21/24  1603 05/21/24  0145 05/20/24  0337   SODIUM mmol/L 142 141 137 142  --  142 142   POTASSIUM mmol/L 4.2 4.0 4.0 3.6 4.4 3.4* 3.8   CHLORIDE mmol/L 98 97* 94* 99  --  101 104   CO2 mmol/L 36.3* 36.0* 32.9* 35.0*  --  32.0* 28.8   BUN mg/dL 25* 24* 21 16  --  16 15   CREATININE mg/dL 1.10* 1.16* 1.16* 0.76  --  0.88 0.74   GLUCOSE mg/dL 91 104* 154* 101*  --  111* 113*   ALBUMIN g/dL  --   --  3.6 3.8  --  3.9 3.3*   BILIRUBIN mg/dL  --   --  1.2 0.9  --  0.8 0.6   ALK PHOS U/L  --   --  59 58  --  56 52   AST (SGOT) U/L  --   --  16 13  --  18 16   ALT (SGPT) U/L  --   --  9 8  --  10 9     UA    Results from last 7 days   Lab Units 05/25/24  1104 "   NITRITE UA  Negative   WBC UA /HPF 3-5*   BACTERIA UA /HPF None Seen   SQUAM EPITHEL UA /HPF 3-6*     Radiology(recent) XR Chest 1 View    Result Date: 5/24/2024  Impression: 1. Cardiomegaly with pulmonary vascular congestion and small pleural effusions 2. Probable COPD with left basilar atelectasis Electronically Signed: Ricardo Deon  5/24/2024 12:38 PM EDT  Workstation ID: [id number]    Assessment:      SOB  Acute exacerbation of diastolic congestive heart failure  ESRD  Hypertension with ESRD  ALISE  Anemia with ESRD  Paroxysmal atrial fibrillation  Hypercoagulable state secondary to atrial fibrillation  Chronic hypoxic respiratory failure  Pulmonary hypertension  Panlobular emphysema  Chronic mucopurulent bronchitis  COPD asthma overlap syndrome  Venous hypertension  Supplemental oxygen dependency  Gastroesophageal reflux disease without esophagitis  History of kidney transplant  Acquired hypothyroidism  History of non-small cell lung cancer  History of lobectomy of lung right lower lobe  Peripheral polyneuropathy  History of peptic ulcer disease  Seasonal allergic rhinitis  Secondary hyperparathyroidism of renal origin  Secondary hyperaldosteronism  Wegener's granulomatosis with renal involvement  Valvular heart disease  OCD  Osteoarthritis  Obsessive-compulsive disorder  History of non-ST segment elevation myocardial infarction  Long-term anticoagulant therapy usage  Long-term use of immunosuppressive biologic  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

## 2024-05-26 NOTE — PLAN OF CARE
Problem: Adult Inpatient Plan of Care  Goal: Plan of Care Review  Outcome: Ongoing, Progressing  Flowsheets (Taken 5/26/2024 0226)  Progress: improving  Plan of Care Reviewed With: patient  Goal: Patient-Specific Goal (Individualized)  Outcome: Ongoing, Progressing  Goal: Absence of Hospital-Acquired Illness or Injury  Outcome: Ongoing, Progressing  Intervention: Identify and Manage Fall Risk  Recent Flowsheet Documentation  Taken 5/26/2024 0204 by Alicja Hodge, RN  Safety Promotion/Fall Prevention:   assistive device/personal items within reach   clutter free environment maintained   fall prevention program maintained   nonskid shoes/slippers when out of bed   room organization consistent   safety round/check completed  Taken 5/26/2024 0005 by Alicja Hodge RN  Safety Promotion/Fall Prevention:   assistive device/personal items within reach   clutter free environment maintained   fall prevention program maintained   nonskid shoes/slippers when out of bed   room organization consistent   safety round/check completed  Taken 5/25/2024 2209 by Alicja Hodge RN  Safety Promotion/Fall Prevention:   assistive device/personal items within reach   clutter free environment maintained   fall prevention program maintained   nonskid shoes/slippers when out of bed   room organization consistent   safety round/check completed  Taken 5/25/2024 2005 by Alicja Hodge, RN  Safety Promotion/Fall Prevention:   assistive device/personal items within reach   clutter free environment maintained   fall prevention program maintained   nonskid shoes/slippers when out of bed   safety round/check completed   room organization consistent  Intervention: Prevent Skin Injury  Recent Flowsheet Documentation  Taken 5/25/2024 2005 by Alicja Hodge, RN  Body Position: supine  Intervention: Prevent and Manage VTE (Venous Thromboembolism) Risk  Recent Flowsheet Documentation  Taken 5/25/2024 2005 by Alicja Hodge, RN  Activity Management:  activity encouraged  VTE Prevention/Management: sequential compression devices off  Range of Motion: active ROM (range of motion) encouraged  Intervention: Prevent Infection  Recent Flowsheet Documentation  Taken 5/26/2024 0204 by Alicja Hodge RN  Infection Prevention:   environmental surveillance performed   hand hygiene promoted   rest/sleep promoted   single patient room provided   personal protective equipment utilized  Taken 5/26/2024 0005 by Alicja Hodge RN  Infection Prevention:   environmental surveillance performed   hand hygiene promoted   personal protective equipment utilized   rest/sleep promoted   single patient room provided  Taken 5/25/2024 2209 by Alicja Hodge RN  Infection Prevention:   environmental surveillance performed   hand hygiene promoted   personal protective equipment utilized   single patient room provided  Taken 5/25/2024 2005 by Alicja Hodge RN  Infection Prevention:   environmental surveillance performed   hand hygiene promoted   personal protective equipment utilized   single patient room provided  Goal: Optimal Comfort and Wellbeing  Outcome: Ongoing, Progressing  Intervention: Monitor Pain and Promote Comfort  Recent Flowsheet Documentation  Taken 5/25/2024 2005 by Alicja Hodge RN  Pain Management Interventions:   breathing exercises   diversional activity provided  Intervention: Provide Person-Centered Care  Recent Flowsheet Documentation  Taken 5/25/2024 2005 by Alicja Hodge RN  Trust Relationship/Rapport:   care explained   choices provided  Goal: Readiness for Transition of Care  Outcome: Ongoing, Progressing  Intervention: Mutually Develop Transition Plan  Recent Flowsheet Documentation  Taken 5/26/2024 0037 by Alicja Hodge RN  Discharge Facility/Level of Care Needs: home with home health  Equipment Needed After Discharge: none  Equipment Currently Used at Home:   walker, rolling   oxygen   pulse ox  Anticipated Changes Related to Illness: inability to  care for someone else  Transportation Anticipated: family or friend will provide  Outpatient/Agency/Support Group Needs: homecare agency  Transportation Concerns: no car  Current Discharge Risk: physical impairment  Concerns to be Addressed: discharge planning  Readmission Within the Last 30 Days: current reason for admission unrelated to previous admission  Patient/Family Anticipated Services at Transition: home health care  Patient/Family Anticipates Transition to:   home with help/services   inpatient rehabilitation facility     Problem: Asthma Comorbidity  Goal: Maintenance of Asthma Control  Outcome: Ongoing, Progressing  Intervention: Maintain Asthma Symptom Control  Recent Flowsheet Documentation  Taken 5/25/2024 2209 by Alicja Hodge RN  Medication Review/Management:   medications reviewed   high-risk medications identified  Taken 5/25/2024 2005 by Alicja Hodge RN  Medication Review/Management:   medications reviewed   high-risk medications identified     Problem: Behavioral Health Comorbidity  Goal: Maintenance of Behavioral Health Symptom Control  Outcome: Ongoing, Progressing  Intervention: Maintain Behavioral Health Symptom Control  Recent Flowsheet Documentation  Taken 5/25/2024 2209 by Alicja Hodge RN  Medication Review/Management:   medications reviewed   high-risk medications identified  Taken 5/25/2024 2005 by Alicja Hodge RN  Medication Review/Management:   medications reviewed   high-risk medications identified     Problem: COPD (Chronic Obstructive Pulmonary Disease) Comorbidity  Goal: Maintenance of COPD Symptom Control  Outcome: Ongoing, Progressing  Intervention: Maintain COPD-Symptom Control  Recent Flowsheet Documentation  Taken 5/25/2024 2209 by Alicja Hodge RN  Medication Review/Management:   medications reviewed   high-risk medications identified  Taken 5/25/2024 2005 by Alicja Hodge RN  Supportive Measures: active listening utilized  Medication Review/Management:    medications reviewed   high-risk medications identified     Problem: Diabetes Comorbidity  Goal: Blood Glucose Level Within Targeted Range  Outcome: Ongoing, Progressing     Problem: Heart Failure Comorbidity  Goal: Maintenance of Heart Failure Symptom Control  Outcome: Ongoing, Progressing  Intervention: Maintain Heart Failure-Management  Recent Flowsheet Documentation  Taken 5/25/2024 2209 by Alicja Hodge RN  Medication Review/Management:   medications reviewed   high-risk medications identified  Taken 5/25/2024 2005 by Alicja Hodge RN  Medication Review/Management:   medications reviewed   high-risk medications identified     Problem: Hypertension Comorbidity  Goal: Blood Pressure in Desired Range  Outcome: Ongoing, Progressing  Intervention: Maintain Blood Pressure Management  Recent Flowsheet Documentation  Taken 5/25/2024 2209 by Alicja Hodge RN  Medication Review/Management:   medications reviewed   high-risk medications identified  Taken 5/25/2024 2005 by Alicja Hodge RN  Syncope Management: position changed slowly  Medication Review/Management:   medications reviewed   high-risk medications identified     Problem: Obstructive Sleep Apnea Risk or Actual Comorbidity Management  Goal: Unobstructed Breathing During Sleep  Outcome: Ongoing, Progressing     Problem: Osteoarthritis Comorbidity  Goal: Maintenance of Osteoarthritis Symptom Control  Outcome: Ongoing, Progressing  Intervention: Maintain Osteoarthritis Symptom Control  Recent Flowsheet Documentation  Taken 5/25/2024 2209 by Alicja Hodge RN  Medication Review/Management:   medications reviewed   high-risk medications identified  Taken 5/25/2024 2005 by Alicja Hodge RN  Activity Management: activity encouraged  Assistive Device Utilized:   gait belt   walker  Medication Review/Management:   medications reviewed   high-risk medications identified     Problem: Pain Chronic (Persistent) (Comorbidity Management)  Goal: Acceptable Pain  Control and Functional Ability  Outcome: Ongoing, Progressing  Intervention: Manage Persistent Pain  Recent Flowsheet Documentation  Taken 5/25/2024 2209 by Alicja Hodge RN  Medication Review/Management:   medications reviewed   high-risk medications identified  Taken 5/25/2024 2005 by Alicja Hodge RN  Bowel Elimination Promotion:   adequate fluid intake promoted   ambulation promoted  Sleep/Rest Enhancement: room darkened  Medication Review/Management:   medications reviewed   high-risk medications identified  Intervention: Develop Pain Management Plan  Recent Flowsheet Documentation  Taken 5/25/2024 2005 by Alicja Hodge RN  Pain Management Interventions:   breathing exercises   diversional activity provided  Intervention: Optimize Psychosocial Wellbeing  Recent Flowsheet Documentation  Taken 5/25/2024 2005 by Alicja Hodge RN  Supportive Measures: active listening utilized  Family/Support System Care:   support provided   self-care encouraged     Problem: Seizure Disorder Comorbidity  Goal: Maintenance of Seizure Control  Outcome: Ongoing, Progressing  Intervention: Maintain Seizure-Symptom Control  Recent Flowsheet Documentation  Taken 5/25/2024 2005 by Alicja Hodge RN  Seizure Precautions: clutter-free environment maintained     Problem: Skin Injury Risk Increased  Goal: Skin Health and Integrity  Outcome: Ongoing, Progressing  Intervention: Optimize Skin Protection  Recent Flowsheet Documentation  Taken 5/25/2024 2005 by Alicja Hodge RN  Pressure Reduction Techniques: frequent weight shift encouraged  Head of Bed (HOB) Positioning: HOB at 20-30 degrees  Pressure Reduction Devices:   specialty bed utilized   pressure-redistributing mattress utilized     Problem: Fall Injury Risk  Goal: Absence of Fall and Fall-Related Injury  Outcome: Ongoing, Progressing  Intervention: Identify and Manage Contributors  Recent Flowsheet Documentation  Taken 5/25/2024 2209 by Alicja Hodge RN  Medication  Review/Management:   medications reviewed   high-risk medications identified  Taken 5/25/2024 2005 by Alicja Hodge RN  Medication Review/Management:   medications reviewed   high-risk medications identified  Self-Care Promotion:   independence encouraged   BADL personal objects within reach  Intervention: Promote Injury-Free Environment  Recent Flowsheet Documentation  Taken 5/26/2024 0204 by Alicja Hodge RN  Safety Promotion/Fall Prevention:   assistive device/personal items within reach   clutter free environment maintained   fall prevention program maintained   nonskid shoes/slippers when out of bed   room organization consistent   safety round/check completed  Taken 5/26/2024 0005 by Alicja Hodge RN  Safety Promotion/Fall Prevention:   assistive device/personal items within reach   clutter free environment maintained   fall prevention program maintained   nonskid shoes/slippers when out of bed   room organization consistent   safety round/check completed  Taken 5/25/2024 2209 by Alicja Hodge, RN  Safety Promotion/Fall Prevention:   assistive device/personal items within reach   clutter free environment maintained   fall prevention program maintained   nonskid shoes/slippers when out of bed   room organization consistent   safety round/check completed  Taken 5/25/2024 2005 by Alicja Hodge, RN  Safety Promotion/Fall Prevention:   assistive device/personal items within reach   clutter free environment maintained   fall prevention program maintained   nonskid shoes/slippers when out of bed   safety round/check completed   room organization consistent  Goal: Absence of Fall and Fall-Related Injury  Outcome: Ongoing, Progressing  Intervention: Identify and Manage Contributors  Recent Flowsheet Documentation  Taken 5/25/2024 2209 by Alicja Hodge, RN  Medication Review/Management:   medications reviewed   high-risk medications identified  Taken 5/25/2024 2005 by Alicja Hodge, RN  Medication  Review/Management:   medications reviewed   high-risk medications identified  Self-Care Promotion:   independence encouraged   BADL personal objects within reach  Intervention: Promote Injury-Free Environment  Recent Flowsheet Documentation  Taken 5/26/2024 0204 by Alicja Hodge, RN  Safety Promotion/Fall Prevention:   assistive device/personal items within reach   clutter free environment maintained   fall prevention program maintained   nonskid shoes/slippers when out of bed   room organization consistent   safety round/check completed  Taken 5/26/2024 0005 by Alicja Hodge RN  Safety Promotion/Fall Prevention:   assistive device/personal items within reach   clutter free environment maintained   fall prevention program maintained   nonskid shoes/slippers when out of bed   room organization consistent   safety round/check completed  Taken 5/25/2024 2209 by Alicja Hodge, RN  Safety Promotion/Fall Prevention:   assistive device/personal items within reach   clutter free environment maintained   fall prevention program maintained   nonskid shoes/slippers when out of bed   room organization consistent   safety round/check completed  Taken 5/25/2024 2005 by Alicja Hodge RN  Safety Promotion/Fall Prevention:   assistive device/personal items within reach   clutter free environment maintained   fall prevention program maintained   nonskid shoes/slippers when out of bed   safety round/check completed   room organization consistent   Goal Outcome Evaluation:  Plan of Care Reviewed With: patient        Progress: improving     Alert and oriented x 4. Able to verbalize needs and wants. Takes medication whole and tolerates well. Continent of bowel. External catheter in place for bladder elimination. C/O pain to bilateral feet, PRN Norco administered, positive effect noted. O2 therapy titrated from 4 LPM via NC to 3 LPM via NC and tolerating well. 3 LPM via NC is patient's baseline. Assist x 2 for transfers. IV Rocephin  initiated on previous shift, no s/s of adverse/allergic reaction noted. Continues to be followed by cardiology, nephrology and pulmonology. Blood cultures from 5/24 still pending, no growth at this time. Per case management patient with discharge home. Currently in bed, eyes closed. Rise and fall of chest observed. Call bell in reach.

## 2024-05-26 NOTE — NURSING NOTE
Daily Care Plan Summary: Heart Failure    Diuretic in use (IV or PO):   PO Lasix 80 mg BID        Daily weight (up or down):    loss: 0lbs      Output > Intake (yes/no):Yes      O2 Requirements (current, any change?):  3 liters/min via nasal cannula (Baseline)      Symptoms noted with Activity (Respiratory Tolerance, functional state):     pain in feet r/t edema      Anticipated Discharge Plans:     home

## 2024-05-26 NOTE — PLAN OF CARE
Goal Outcome Evaluation:  Plan of Care Reviewed With: patient           Outcome Evaluation: Pt is a 78 YO F admitted with SOA, acute exacerbation of CHF. Readmitted following return home from [facility] for 2 days. Pt states generally she is independent with mobility, ambulation with RWx and has had recent falls. Pt this date requiring MIN A for all mobility, using RWx for ambulation. Pt appears below functional baseline and does not appear safe for return home at this time. Recommendation is SNF following d/c. PT to continue to follow while admitted.      Anticipated Discharge Disposition (PT): skilled nursing facility

## 2024-05-26 NOTE — PROGRESS NOTES
Referring Provider: Juanis Lowe MD       SUBJECTIVE    Nephrology team following  Continues to be fluid overloaded, diuresing well     ROS  Review of all systems negative except as indicated above    Personal History:    Past Medical History:   Diagnosis Date    Allergic rhinitis 04/14/2015    Asthma 08/10/2017    Atrial flutter 05/11/2017    Chronic diarrhea 04/15/2019    Chronic hypoxemic respiratory failure 01/18/2024    Chronic pain 02/03/2015    COPD (chronic obstructive pulmonary disease)     COVID-19 virus detected 08/11/2022    Cytokine release syndrome, grade 1 08/16/2022    Edema of both lower extremities due to peripheral venous insufficiency 03/12/2021    ESRD on hemodialysis 05/22/2013    Essential (primary) hypertension 03/03/2022    Fracture of ulnar styloid 05/19/2022    Fracture of unspecified carpal bone, right wrist, subsequent encounter for fracture with routine healing 03/03/2022    Gastroesophageal reflux disease 11/12/2020    Gout 08/10/2017    Hearing loss 08/10/2017    History of appendectomy     History of DVT (deep vein thrombosis) 11/12/2020    History of kidney transplant 06/30/2017    History of lobectomy of lung 01/18/2024 03/08/2016:  RIGHT Lower Lobe Mass--> Right Video-assisted thoracoscopy with a moderate-to-large wedge resection of the RLL (by Dr. Haris Mcconnell @ [facility])--> Poorly differentiated carcinoma of the RLL.      History of repair of hip joint 05/21/2013    History of suicide attempt 05/20/2024    Hyperlipidemia 08/11/2014    Hypothyroidism, unspecified 03/03/2022    Infection due to extended spectrum beta lactamase (ESBL) producing bacteria 03/11/2016    No A2K system hx. +ESBL E coli urine on 3/11/16.      Irritable bowel syndrome 04/15/2019    Long term (current) use of immunosuppressive biologic 04/28/2021    Long term current use of anticoagulant therapy 01/18/2024    Malignant neoplasm of lung 08/10/2017    Menopausal flushing 08/30/2021    Mitral  valve regurgitation 07/06/2015    Mixed anxiety and depressive disorder 04/30/2015    Non-small cell lung cancer 08/10/2017    Nonobstructive atherosclerosis of coronary artery 06/02/2019 08/12/2022: CATH: [facility]: NSTEMI assoc with Covid-19: LM:-nl;  LAD: diffuse, Ca++; 30%; CIRC: Dominant. Normal; RCA: small; 50% diffuse, proximal and mid-segment.      NSTEMI (non-ST elevated myocardial infarction) 08/11/2022    Obsessive-compulsive disorder 04/15/2019    Osteoarthritis 05/20/2024    Paroxysmal atrial fibrillation 05/11/2017    Paroxysmal supraventricular tachycardia 05/11/2017    Peripheral neuropathy 08/11/2014    Personal history of peptic ulcer disease 11/12/2020    Postoperative anemia due to acute blood loss 05/22/2013    Right wrist fracture 03/01/2022    Seasonal allergies 08/10/2017    Secondary hyperparathyroidism of renal origin 09/14/2015    Tricuspid valve regurgitation 03/15/2017    Ulcer of lower extremity 08/30/2021    Unspecified acute appendicitis 03/03/2022    Valvular heart disease 05/20/2024    Wegener's granulomatosis with renal involvement 03/03/2022       Past Surgical History:   Procedure Laterality Date    APPENDECTOMY      BREAST SURGERY Left     cysts rmeoved    CARDIAC CATHETERIZATION Right 08/12/2022    Procedure: Left Heart Cath and coronary angiogram;  Surgeon: Jak Gavin MD;  Location: [facility] CATH INVASIVE LOCATION;  Service: Cardiology;  Laterality: Right;    CLOSED REDUCTION WRIST FRACTURE Right 03/01/2022    Procedure: WRIST CLOSED REDUCTION;  Surgeon: Gaudencio Townsend MD;  Location: [facility] MAIN OR;  Service: Orthopedics;  Laterality: Right;    CYSTOSCOPY      HIP ARTHROPLASTY      HYSTERECTOMY      LUNG LOBECTOMY Right     TRANSPLANTATION RENAL         Family History   Problem Relation Age of Onset    No Known Problems Mother     No Known Problems Father        Social History     Tobacco Use    Smoking status: Former    Smokeless tobacco: Never   Vaping Use    Vaping  status: Former   Substance Use Topics    Alcohol use: Never    Drug use: Never        Home meds:  Prior to Admission medications    Medication Sig Start Date End Date Taking? Authorizing Provider   acetaminophen (Tylenol) 325 MG tablet Take 2 tablets by mouth Every 4 (Four) Hours As Needed for Fever or Mild Pain. 3/13/20   Court Vences MD   albuterol sulfate  (90 Base) MCG/ACT inhaler Inhale 2 puffs Every 6 (Six) Hours As Needed for Wheezing.    Court Vences MD   apixaban (ELIQUIS) 5 MG tablet tablet Take 1 tablet by mouth Every 12 (Twelve) Hours. 8/16/22   Clyde White DO   Carboxymethylcellulose Sodium (DRY EYE RELIEF OP) Apply 2 drops to eye(s) as directed by provider 2 (Two) Times a Day As Needed (dry eyes). 5/11/22   Court Vences MD   colestipol (COLESTID) 1 g tablet Take 1 tablet by mouth Daily.    Court Vences MD   diazePAM (VALIUM) 5 MG tablet Take 5 mg by mouth 2 (Two) Times a Day As Needed for Anxiety.    Court Vences MD   Diclofenac Sodium (Aspercreme Arthritis Pain) 1 % gel gel Apply 4 g topically to the appropriate area as directed 2 (Two) Times a Day As Needed (pain). 10/21/20   Court Vences MD   dilTIAZem (CARDIZEM) 90 MG tablet Take 1 tablet by mouth Every 8 (Eight) Hours. 5/22/24   Mariangel Stearns APRN   DULoxetine HCl 40 MG capsule delayed-release particles Take 1 capsule by mouth Daily.    Court Vences MD   ferrous sulfate 324 (65 Fe) MG tablet delayed-release EC tablet Take 1 tablet by mouth Daily With Breakfast. 5/23/24   Mariangel Stearns APRN   furosemide (LASIX) 80 MG tablet Take 1 tablet by mouth 2 (Two) Times a Day. 5/22/24   Mariangel Stearns APRN   guaiFENesin (Mucinex) 600 MG 12 hr tablet Take 1,200 mg by mouth 2 (Two) Times a Day. 8/16/22   Court Vences MD   levothyroxine (SYNTHROID, LEVOTHROID) 50 MCG tablet Take 1 tablet by mouth Daily.    Court Vences MD   loperamide  "(IMODIUM) 2 MG capsule Take 2 mg by mouth 4 (Four) Times a Day As Needed for Diarrhea. 3/13/20   Court Vences MD   metoprolol succinate XL (TOPROL-XL) 50 MG 24 hr tablet Take 50 mg by mouth Daily.    Court Vences MD   Zlsuv-Lztit-Nysvycd-Pramoxine (Neosporin + Pain Relief Max St) 1 % ointment Apply 1 application topically to the appropriate area as directed 2 (Two) Times a Day As Needed (sores). 7/2/20   Court Vences MD   potassium chloride (KLOR-CON M20) 20 MEQ CR tablet Take 2 tablets by mouth Daily. 5/22/24   DarynMariangel hargrove APRN   predniSONE (DELTASONE) 5 MG tablet Take 5 mg by mouth Daily.    Court Vences MD   Salicylic Acid-Urea (KERASAL EX) Apply 1 application topically to the appropriate area as directed 2 (Two) Times a Day As Needed (dry feet). 11/23/19   Court Vences MD   tacrolimus (PROGRAF) 1 MG capsule Take 1 mg by mouth 2 (Two) Times a Day.    Court Vences MD       Allergies:  Zolpidem      OBJECTIVE    Vital Signs  Vitals:    05/26/24 0406 05/26/24 0700 05/26/24 1100 05/26/24 1557   BP:  117/42 144/66 143/62   BP Location:  Left arm Left arm Left arm   Patient Position:  Lying Lying Lying   Pulse:  61 77 85   Resp:  19 23 26   Temp:  97.9 °F (36.6 °C) 98 °F (36.7 °C) 98.3 °F (36.8 °C)   TempSrc:  Oral Oral Oral   SpO2:  91% 96% 99%   Weight: 84.6 kg (186 lb 8.2 oz)      Height:           Flowsheet Rows      Flowsheet Row First Filed Value   Admission Height 167.6 cm (66\") Documented at 05/24/2024 1143   Admission Weight 86.2 kg (190 lb) Documented at 05/24/2024 1143              Intake/Output Summary (Last 24 hours) at 5/26/2024 1705  Last data filed at 5/25/2024 2334  Gross per 24 hour   Intake 480 ml   Output --   Net 480 ml              Physical Exam:  General-no acute distress  No elevated JVP noted.  Cardiovascular-S1-S2 normal, no murmurs noted.  Respiratory-normal breath sounds, no wheezing/crackles.  GI-abdomen is soft and " nontender  No pedal edema        Results Review:    BNP        TROPONIN  Results from last 7 days   Lab Units 05/24/24  1408   HSTROP T ng/L 35*       CoAg  Results from last 7 days   Lab Units 05/24/24  1210   INR  1.16*   APTT seconds 30.9*       Creatinine Clearance  Estimated Creatinine Clearance: 46.9 mL/min (A) (by C-G formula based on SCr of 1.1 mg/dL (H)).      Radiology  No radiology results for the last day      EKG  I personally viewed and interpreted the patient's EKG/Telemetry data:  ECG 12 Lead Dyspnea   Final Result   HEART RATE= 82  bpm   RR Interval= 732  ms   RI Interval=   ms   P Horizontal Axis=   deg   P Front Axis=   deg   QRSD Interval= 88  ms   QT Interval= 407  ms   QTcB= 476  ms   QRS Axis= -32  deg   T Wave Axis= 49  deg   - ABNORMAL ECG -   Atrial fibrillation   Left axis deviation   When compared with ECG of 20-May-2024 9:37:54,   Significant rate increase   Significant repolarization change   Electronically Signed By: Praneeth Theodore (Derek) 25-May-2024 07:53:06   Date and Time of Study: 2024-05-24 11:36:06      Telemetry Scan   Final Result      Telemetry Scan   Final Result      Telemetry Scan   Final Result            Echocardiogram:    Results for orders placed during the hospital encounter of 05/17/24    Adult Transthoracic Echo Complete W/ Cont if Necessary Per Protocol    Interpretation Summary    Left ventricular systolic function is normal. Calculated left ventricular EF = 55% Left ventricular ejection fraction appears to be 51 - 55%.    Left ventricular diastolic function was indeterminate.    Left atrial volume is moderately increased.    The right atrial cavity is dilated.    Severe tricuspid valve regurgitation is present.    Estimated right ventricular systolic pressure from tricuspid regurgitation is markedly elevated (>55 mmHg).    Mild to moderate mitral valve regurgitation is present        Stress Test:         Cardiac Catheterization:  Results for orders placed during  the hospital encounter of 22    Cardiac Catheterization/Vascular Study    Conclusion  IMPRESSIONS  Non obstructive CAD         Other:         ASSESSMENT & PLAN:      Acute exacerbation of heart failure with preserved ejection fraction  Pulmonary hypertension/severe tricuspid valve regurgitation  Continue IV diuresis with Lasix 80 mg twice daily, nephrology team following     History of atrial fibrillation  Elevated [id number] score  Continue home Eliquis 5 mg twice daily  Rate controlled on diltiazem and metoprolol     ESRD status post  donor transplant  Nephrology team following  Continue prednisone and Prograf     Nonobstructive coronary artery disease, coronary angiogram performed in  with Dr. Gavin        Part of this note may be an electronic transcription/translation of spoken language to printed text using the Dragon Dictation System.    Keturah Wills MD  24  17:05 EDT

## 2024-05-26 NOTE — THERAPY EVALUATION
Patient Name: Jaja Carcamo  : 1945    MRN: [id number]                              Today's Date: 2024       Admit Date: 2024    Visit Dx:     ICD-10-CM ICD-9-CM   1. Acute on chronic congestive heart failure, unspecified heart failure type  I50.9 428.0   2. Shortness of breath  R06.02 786.05   3. Hypoxia  R09.02 799.02     Patient Active Problem List   Diagnosis    COPD (chronic obstructive pulmonary disease)    Acute UTI    Atrial fibrillation with rapid ventricular response    Volume overload    Fall    Hypothyroidism, unspecified    Hyperlipidemia    History of suicide attempt    History of lobectomy of lung    Irritable bowel syndrome    History of kidney transplant    Long term (current) use of immunosuppressive biologic    Long term current use of anticoagulant therapy    Menopausal flushing    Mitral valve regurgitation    Tricuspid valve regurgitation    History of lung cancer    Nonobstructive atherosclerosis of coronary artery    Obsessive-compulsive disorder    Osteoarthritis of left hip    Primary osteoarthritis of right hip    Paroxysmal supraventricular tachycardia    Peripheral neuropathy    History of DVT (deep vein thrombosis)    Personal history of peptic ulcer disease    Pulmonary hypertension    Seasonal allergies    History of repair of hip joint    Gout    Gastroesophageal reflux disease    Hearing loss    Edema of both lower extremities due to peripheral venous insufficiency    Mixed anxiety and depressive disorder    Chronic pain    Chronic hypoxemic respiratory failure    Chronic diarrhea    Asthma    Osteoarthritis    Allergic rhinitis    Wegener's granulomatosis with renal involvement    Essential (primary) hypertension    Paroxysmal atrial fibrillation    Valvular heart disease    Acute exacerbation of CHF (congestive heart failure)     Past Medical History:   Diagnosis Date    Allergic rhinitis 2015    Asthma 08/10/2017    Atrial flutter 2017    Chronic  diarrhea 04/15/2019    Chronic hypoxemic respiratory failure 01/18/2024    Chronic pain 02/03/2015    COPD (chronic obstructive pulmonary disease)     COVID-19 virus detected 08/11/2022    Cytokine release syndrome, grade 1 08/16/2022    Edema of both lower extremities due to peripheral venous insufficiency 03/12/2021    ESRD on hemodialysis 05/22/2013    Essential (primary) hypertension 03/03/2022    Fracture of ulnar styloid 05/19/2022    Fracture of unspecified carpal bone, right wrist, subsequent encounter for fracture with routine healing 03/03/2022    Gastroesophageal reflux disease 11/12/2020    Gout 08/10/2017    Hearing loss 08/10/2017    History of appendectomy     History of DVT (deep vein thrombosis) 11/12/2020    History of kidney transplant 06/30/2017    History of lobectomy of lung 01/18/2024 03/08/2016:  RIGHT Lower Lobe Mass--> Right Video-assisted thoracoscopy with a moderate-to-large wedge resection of the RLL (by Dr. Haris Mcconnell @ [facility])--> Poorly differentiated carcinoma of the RLL.      History of repair of hip joint 05/21/2013    History of suicide attempt 05/20/2024    Hyperlipidemia 08/11/2014    Hypothyroidism, unspecified 03/03/2022    Infection due to extended spectrum beta lactamase (ESBL) producing bacteria 03/11/2016    No A2K system hx. +ESBL E coli urine on 3/11/16.      Irritable bowel syndrome 04/15/2019    Long term (current) use of immunosuppressive biologic 04/28/2021    Long term current use of anticoagulant therapy 01/18/2024    Malignant neoplasm of lung 08/10/2017    Menopausal flushing 08/30/2021    Mitral valve regurgitation 07/06/2015    Mixed anxiety and depressive disorder 04/30/2015    Non-small cell lung cancer 08/10/2017    Nonobstructive atherosclerosis of coronary artery 06/02/2019 08/12/2022: CATH: Prashant: NSTEMI assoc with Covid-19: LM:-nl;  LAD: diffuse, Ca++; 30%; CIRC: Dominant. Normal; RCA: small; 50% diffuse, proximal and  mid-segment.      NSTEMI (non-ST elevated myocardial infarction) 08/11/2022    Obsessive-compulsive disorder 04/15/2019    Osteoarthritis 05/20/2024    Paroxysmal atrial fibrillation 05/11/2017    Paroxysmal supraventricular tachycardia 05/11/2017    Peripheral neuropathy 08/11/2014    Personal history of peptic ulcer disease 11/12/2020    Postoperative anemia due to acute blood loss 05/22/2013    Right wrist fracture 03/01/2022    Seasonal allergies 08/10/2017    Secondary hyperparathyroidism of renal origin 09/14/2015    Tricuspid valve regurgitation 03/15/2017    Ulcer of lower extremity 08/30/2021    Unspecified acute appendicitis 03/03/2022    Valvular heart disease 05/20/2024    Wegener's granulomatosis with renal involvement 03/03/2022     Past Surgical History:   Procedure Laterality Date    APPENDECTOMY      BREAST SURGERY Left     cysts rmeoved    CARDIAC CATHETERIZATION Right 08/12/2022    Procedure: Left Heart Cath and coronary angiogram;  Surgeon: Jak Gavin MD;  Location: [facility] CATH INVASIVE LOCATION;  Service: Cardiology;  Laterality: Right;    CLOSED REDUCTION WRIST FRACTURE Right 03/01/2022    Procedure: WRIST CLOSED REDUCTION;  Surgeon: Gaudencio Townsend MD;  Location: [facility] MAIN OR;  Service: Orthopedics;  Laterality: Right;    CYSTOSCOPY      HIP ARTHROPLASTY      HYSTERECTOMY      LUNG LOBECTOMY Right     TRANSPLANTATION RENAL        General Information       Row Name 05/26/24 1534          Physical Therapy Time and Intention    Document Type evaluation  -EL     Mode of Treatment individual therapy;physical therapy  -       Row Name 05/26/24 1534          General Information    Prior Level of Function independent:;all household mobility;ADL's  Prior to decline. Per son she is requiring increased care  -EL       Row Name 05/26/24 1534          Living Environment    People in Home alone  -EL       Row Name 05/26/24 1534          Cognition    Orientation Status (Cognition) oriented x 4  -EL        Row Name 05/26/24 1534          Safety Issues, Functional Mobility    Impairments Affecting Function (Mobility) balance;cognition;strength;endurance/activity tolerance  -EL     Cognitive Impairments, Mobility Safety/Performance insight into deficits/self-awareness;problem-solving/reasoning;judgment;awareness, need for assistance  -EL               User Key  (r) = Recorded By, (t) = Taken By, (c) = Cosigned By      Initials Name Provider Type    Max Jarvis PT Physical Therapist                   Mobility       Row Name 05/26/24 1535          Bed Mobility    Bed Mobility supine-sit  -EL     Supine-Sit [place] (Bed Mobility) minimum assist (75% patient effort)  -EL       Row Name 05/26/24 1535          Bed-Chair Transfer    Bed-Chair [place] (Transfers) minimum assist (75% patient effort)  -EL       Row Name 05/26/24 1535          Sit-Stand Transfer    Sit-Stand [place] (Transfers) minimum assist (75% patient effort)  -EL       Row Name 05/26/24 1535          Gait/Stairs (Locomotion)    [place] Level (Gait) minimum assist (75% patient effort)  -EL     Assistive Device (Gait) walker, front-wheeled  -EL     Patient was able to Ambulate yes  -EL     Distance in Feet (Gait) 25  -EL     Deviations/Abnormal Patterns (Gait) gait speed decreased  -EL     Bilateral Gait Deviations forward flexed posture  -EL               User Key  (r) = Recorded By, (t) = Taken By, (c) = Cosigned By      Initials Name Provider Type    Max Jarvis PT Physical Therapist                   Obj/Interventions       Row Name 05/26/24 1538          Range of Motion Comprehensive    General Range of Motion bilateral lower extremity ROM WFL  -EL       Row Name 05/26/24 1538          Strength Comprehensive (MMT)    General Manual Muscle Testing (MMT) Assessment lower extremity strength deficits identified  -EL     Comment, General Manual Muscle Testing (MMT) Assessment BLE 4-/5 gross  -EL       Row Name 05/26/24 1538           Balance    Balance Assessment sitting static balance;standing static balance;standing dynamic balance  -EL     Static Sitting Balance independent  -EL     Static Standing Balance minimal assist  -EL     Dynamic Standing Balance minimal assist  -EL       Row Name 05/26/24 1538          Sensory Assessment (Somatosensory)    Sensory Assessment (Somatosensory) sensation intact  -EL               User Key  (r) = Recorded By, (t) = Taken By, (c) = Cosigned By      Initials Name Provider Type    Max Jarvis, PT Physical Therapist                   Goals/Plan       Row Name 05/26/24 1600          Bed Mobility Goal 1 (PT)    Activity/Assistive Device (Bed Mobility Goal 1, PT) bed mobility activities, all  -EL     [place] Level/Cues Needed (Bed Mobility Goal 1, PT) modified independence  -EL     Time Frame (Bed Mobility Goal 1, PT) long term goal (LTG);2 weeks  -EL       Row Name 05/26/24 1600          Transfer Goal 1 (PT)    Activity/Assistive Device (Transfer Goal 1, PT) transfers, all;walker, rolling  -EL     [place] Level/Cues Needed (Transfer Goal 1, PT) supervision required  -EL     Time Frame (Transfer Goal 1, PT) long term goal (LTG);2 weeks  -EL       Row Name 05/26/24 1600          Gait Training Goal 1 (PT)    Activity/Assistive Device (Gait Training Goal 1, PT) gait (walking locomotion);walker, rolling  -EL     [place] Level (Gait Training Goal 1, PT) supervision required  -EL     Distance (Gait Training Goal 1, PT) 150  -EL     Time Frame (Gait Training Goal 1, PT) long term goal (LTG);2 weeks  -EL       Row Name 05/26/24 1600          Therapy Assessment/Plan (PT)    Planned Therapy Interventions (PT) balance training;neuromuscular re-education;bed mobility training;transfer training;gait training;patient/family education;strengthening  -EL               User Key  (r) = Recorded By, (t) = Taken By, (c) = Cosigned By      Initials Name Provider Type    Max Jarvis, PT Physical Therapist                    Clinical Impression       Row Name 05/26/24 1540          Pain    Pretreatment Pain Rating 0/10 - no pain  -EL     Posttreatment Pain Rating 0/10 - no pain  -EL       Row Name 05/26/24 1540          Plan of Care Review    Plan of Care Reviewed With patient  -EL     Outcome Evaluation Pt is a 78 YO F admitted with SOA, acute exacerbation of CHF. Readmitted following return home from [facility] for [facility] days. Pt states generally she is independent with mobility, ambulation with RWx and has had recent falls. Pt this date requiring MIN A for all mobility, using RWx for ambulation. Pt appears below functional baseline and does not appear safe for return home at this time. Recommendation is SNF following d/c. PT to continue to follow while admitted.  -EL       Row Name 05/26/24 1540          Therapy Assessment/Plan (PT)    Rehab Potential (PT) good, to achieve stated therapy goals  -EL     Criteria for Skilled Interventions Met (PT) yes  -EL     Therapy Frequency (PT) 5 times/wk  -EL     Predicted Duration of Therapy Intervention (PT) Until d/c  -EL       Row Name 05/26/24 1540          Vital Signs    O2 Delivery Pre Treatment room air  -EL     O2 Delivery Intra Treatment room air  -EL     O2 Delivery Post Treatment room air  -EL     Pre Patient Position Supine  -EL     Intra Patient Position Standing  -EL     Post Patient Position Sitting  -EL       Row Name 05/26/24 1540          Positioning and Restraints    Pre-Treatment Position in bed  -EL     Post Treatment Position chair  -EL     In Chair notified nsg;reclined;call light within reach;encouraged to call for assist;exit alarm on  -EL               User Key  (r) = Recorded By, (t) = Taken By, (c) = Cosigned By      Initials Name Provider Type    EL Max Chong, PT Physical Therapist                   Outcome Measures       Row Name 05/26/24 1600 05/26/24 0847       How much help from another person do you currently need...    Turning from your back to your side while  in flat bed without using bedrails? 3  -EL 3  -TK    Moving from lying on back to sitting on the side of a flat bed without bedrails? 3  -EL 2  -TK    Moving to and from a bed to a chair (including a wheelchair)? 2  -EL 2  -TK    Standing up from a chair using your arms (e.g., wheelchair, bedside chair)? 3  -EL 2  -TK    Climbing 3-5 steps with a railing? 1  -EL 1  -TK    To walk in hospital room? 3  -EL 1  -TK    AM-PAC 6 Clicks Score (PT) 15  -EL 11  -TK    Highest Level of Mobility Goal 4 --> Transfer to chair/commode  -EL 4 --> Transfer to chair/commode  -TK      Row Name 05/26/24 1600          Functional Assessment    Outcome Measure Options AM-PAC 6 Clicks Basic Mobility (PT)  -               User Key  (r) = Recorded By, (t) = Taken By, (c) = Cosigned By      Initials Name Provider Type    EL Max Chong, PT Physical Therapist    Araceli Mast RN Registered Nurse                                 Physical Therapy Education       Title: PT OT SLP Therapies (Done)       Topic: Physical Therapy (Done)       Point: Mobility training (Done)       Learning Progress Summary             Patient Acceptance, E,TB, VU by  at 5/26/2024 1601                         Point: Precautions (Done)       Learning Progress Summary             Patient Acceptance, E,TB, VU by  at 5/26/2024 1601                                         User Key       Initials Effective Dates Name Provider Type Discipline     06/23/20 -  Max Chong, PT Physical Therapist PT                  PT Recommendation and Plan  Planned Therapy Interventions (PT): balance training, neuromuscular re-education, bed mobility training, transfer training, gait training, patient/family education, strengthening  Plan of Care Reviewed With: patient  Outcome Evaluation: Pt is a 78 YO F admitted with SOA, acute exacerbation of CHF. Readmitted following return home from [facility] for [facility] days. Pt states generally she is independent with mobility, ambulation with RWx and has  had recent falls. Pt this date requiring MIN A for all mobility, using RWx for ambulation. Pt appears below functional baseline and does not appear safe for return home at this time. Recommendation is SNF following d/c. PT to continue to follow while admitted.     Time Calculation:   PT Evaluation Complexity  History, PT Evaluation Complexity: 1-2 personal factors and/or comorbidities  Examination of Body Systems (PT Eval Complexity): total of 3 or more elements  Clinical Presentation (PT Evaluation Complexity): evolving  Clinical Decision Making (PT Evaluation Complexity): moderate complexity  Overall Complexity (PT Evaluation Complexity): moderate complexity     PT Charges       Row Name 05/26/24 1602             Time Calculation    Start Time 1302  -EL      Stop Time 1322  -EL      Time Calculation (min) 20 min  -EL      PT Received On 05/26/24  -EL      PT - Next Appointment 05/28/24  -EL      PT Goal Re-Cert Due Date 06/09/24  -EL                User Key  (r) = Recorded By, (t) = Taken By, (c) = Cosigned By      Initials Name Provider Type    EL Max Chong, PT Physical Therapist                  Therapy Charges for Today       Code Description Service Date Service Provider Modifiers Qty    [id number]  PT EVAL MOD COMPLEXITY 4 5/26/2024 Max Chong, PT GP 1            PT G-Codes  Outcome Measure Options: AM-PAC 6 Clicks Basic Mobility (PT)  AM-PAC 6 Clicks Score (PT): 15  PT Discharge Summary  Anticipated Discharge Disposition (PT): skilled nursing facility    Max Chong PT  5/26/2024

## 2024-05-26 NOTE — PLAN OF CARE
Goal Outcome Evaluation:  Patient currently doing well with no complaints.  She has been ambulating to the [facility] with minimal assistance.  Patient will like

## 2024-05-26 NOTE — THERAPY EVALUATION
Patient Name: Jaja Carcamo  : 1945    MRN: [id number]                              Today's Date: 2024       Admit Date: 2024    Visit Dx:     ICD-10-CM ICD-9-CM   1. Acute on chronic congestive heart failure, unspecified heart failure type  I50.9 428.0   2. Shortness of breath  R06.02 786.05   3. Hypoxia  R09.02 799.02     Patient Active Problem List   Diagnosis    COPD (chronic obstructive pulmonary disease)    Acute UTI    Atrial fibrillation with rapid ventricular response    Volume overload    Fall    Hypothyroidism, unspecified    Hyperlipidemia    History of suicide attempt    History of lobectomy of lung    Irritable bowel syndrome    History of kidney transplant    Long term (current) use of immunosuppressive biologic    Long term current use of anticoagulant therapy    Menopausal flushing    Mitral valve regurgitation    Tricuspid valve regurgitation    History of lung cancer    Nonobstructive atherosclerosis of coronary artery    Obsessive-compulsive disorder    Osteoarthritis of left hip    Primary osteoarthritis of right hip    Paroxysmal supraventricular tachycardia    Peripheral neuropathy    History of DVT (deep vein thrombosis)    Personal history of peptic ulcer disease    Pulmonary hypertension    Seasonal allergies    History of repair of hip joint    Gout    Gastroesophageal reflux disease    Hearing loss    Edema of both lower extremities due to peripheral venous insufficiency    Mixed anxiety and depressive disorder    Chronic pain    Chronic hypoxemic respiratory failure    Chronic diarrhea    Asthma    Osteoarthritis    Allergic rhinitis    Wegener's granulomatosis with renal involvement    Essential (primary) hypertension    Paroxysmal atrial fibrillation    Valvular heart disease    Acute exacerbation of CHF (congestive heart failure)     Past Medical History:   Diagnosis Date    Allergic rhinitis 2015    Asthma 08/10/2017    Atrial flutter 2017    Chronic  diarrhea 04/15/2019    Chronic hypoxemic respiratory failure 01/18/2024    Chronic pain 02/03/2015    COPD (chronic obstructive pulmonary disease)     COVID-19 virus detected 08/11/2022    Cytokine release syndrome, grade 1 08/16/2022    Edema of both lower extremities due to peripheral venous insufficiency 03/12/2021    ESRD on hemodialysis 05/22/2013    Essential (primary) hypertension 03/03/2022    Fracture of ulnar styloid 05/19/2022    Fracture of unspecified carpal bone, right wrist, subsequent encounter for fracture with routine healing 03/03/2022    Gastroesophageal reflux disease 11/12/2020    Gout 08/10/2017    Hearing loss 08/10/2017    History of appendectomy     History of DVT (deep vein thrombosis) 11/12/2020    History of kidney transplant 06/30/2017    History of lobectomy of lung 01/18/2024 03/08/2016:  RIGHT Lower Lobe Mass--> Right Video-assisted thoracoscopy with a moderate-to-large wedge resection of the RLL (by Dr. Haris Mcconnell @ [facility])--> Poorly differentiated carcinoma of the RLL.      History of repair of hip joint 05/21/2013    History of suicide attempt 05/20/2024    Hyperlipidemia 08/11/2014    Hypothyroidism, unspecified 03/03/2022    Infection due to extended spectrum beta lactamase (ESBL) producing bacteria 03/11/2016    No A2K system hx. +ESBL E coli urine on 3/11/16.      Irritable bowel syndrome 04/15/2019    Long term (current) use of immunosuppressive biologic 04/28/2021    Long term current use of anticoagulant therapy 01/18/2024    Malignant neoplasm of lung 08/10/2017    Menopausal flushing 08/30/2021    Mitral valve regurgitation 07/06/2015    Mixed anxiety and depressive disorder 04/30/2015    Non-small cell lung cancer 08/10/2017    Nonobstructive atherosclerosis of coronary artery 06/02/2019 08/12/2022: CATH: Prashant: NSTEMI assoc with Covid-19: LM:-nl;  LAD: diffuse, Ca++; 30%; CIRC: Dominant. Normal; RCA: small; 50% diffuse, proximal and  mid-segment.      NSTEMI (non-ST elevated myocardial infarction) 08/11/2022    Obsessive-compulsive disorder 04/15/2019    Osteoarthritis 05/20/2024    Paroxysmal atrial fibrillation 05/11/2017    Paroxysmal supraventricular tachycardia 05/11/2017    Peripheral neuropathy 08/11/2014    Personal history of peptic ulcer disease 11/12/2020    Postoperative anemia due to acute blood loss 05/22/2013    Right wrist fracture 03/01/2022    Seasonal allergies 08/10/2017    Secondary hyperparathyroidism of renal origin 09/14/2015    Tricuspid valve regurgitation 03/15/2017    Ulcer of lower extremity 08/30/2021    Unspecified acute appendicitis 03/03/2022    Valvular heart disease 05/20/2024    Wegener's granulomatosis with renal involvement 03/03/2022     Past Surgical History:   Procedure Laterality Date    APPENDECTOMY      BREAST SURGERY Left     cysts rmeoved    CARDIAC CATHETERIZATION Right 08/12/2022    Procedure: Left Heart Cath and coronary angiogram;  Surgeon: Jak Gavin MD;  Location: [facility] CATH INVASIVE LOCATION;  Service: Cardiology;  Laterality: Right;    CLOSED REDUCTION WRIST FRACTURE Right 03/01/2022    Procedure: WRIST CLOSED REDUCTION;  Surgeon: Gaudencio Townsend MD;  Location: [facility] MAIN OR;  Service: Orthopedics;  Laterality: Right;    CYSTOSCOPY      HIP ARTHROPLASTY      HYSTERECTOMY      LUNG LOBECTOMY Right     TRANSPLANTATION RENAL        General Information       Row Name 05/26/24 1632          OT Time and Intention    Document Type evaluation  -SR     Mode of Treatment occupational therapy  -SR       Row Name 05/26/24 1632          Occupational Profile    Reason for Services/Referral (Occupational Profile) 79 y.o. female with a h/o chronic HFpEF, atrial fibrillation, COPD, non-obstructive CAD, asthma, HTN, kidney transplant and lung cancer who presented to the ER with shortness of breath and weakness.  She was also noted to have bilateral lower extremity edema.  Patient was recently hospitalized for  UTI and A fib with RVR.  She was discharged on 5/22/24, and she refused discharge to a SNF as recommended.  She also refused home health and outpatient PT.  Pt states generally she is independent with mobility, ambulation with RWx and has had recent falls.  -       Row Name 05/26/24 1632          Living Environment    People in Home alone  -       Row Name 05/26/24 1632          Cognition    Orientation Status (Cognition) oriented x 4  -       Row Name 05/26/24 1632          Safety Issues, Functional Mobility    Impairments Affecting Function (Mobility) balance;cognition;strength;endurance/activity tolerance  -SR               User Key  (r) = Recorded By, (t) = Taken By, (c) = Cosigned By      Initials Name Provider Type    SR Ida Herndon, OT Occupational Therapist                     Mobility/ADL's       Row Name 05/26/24 1632          Bed-Chair Transfer    Bed-Chair [place] (Transfers) minimum assist (75% patient effort)  -       Row Name 05/26/24 1632          Sit-Stand Transfer    Sit-Stand [place] (Transfers) minimum assist (75% patient effort)  -       Row Name 05/26/24 1632          Functional Mobility    Functional Mobility- Ind. Level contact guard assist  -SR     Functional Mobility- Device walker, front-wheeled  -SR     Functional Mobility- Comment Becomes visibly fatigued while walking on 3 L of O2.  Sats 85% when returning to room, though improved after PLB and increasing to 6 LPM during recovery  -SR       Row Name 05/26/24 1632          Activities of Daily Living    BADL Assessment/Intervention lower body dressing  -       Row Name 05/26/24 1632          Lower Body Dressing Assessment/Training    [place] Level (Lower Body Dressing) don;socks;dependent (less than 25% patient effort)  -SR               User Key  (r) = Recorded By, (t) = Taken By, (c) = Cosigned By      Initials Name Provider Type    SR Ida Herndon OT Occupational Therapist                    Obj/Interventions       Row Name 05/26/24 1634          Range of Motion Comprehensive    General Range of Motion no range of motion deficits identified  -SR       Row Name 05/26/24 1634          Strength Comprehensive (MMT)    Comment, General Manual Muscle Testing (MMT) Assessment BUE 3+/5  -SR       Row Name 05/26/24 1634          Balance    Static Sitting Balance independent  -SR     Static Standing Balance minimal assist  -SR     Dynamic Standing Balance minimal assist  -SR     Balance Interventions sitting;standing;sit to stand;supported;static;minimal challenge;dynamic  -SR               User Key  (r) = Recorded By, (t) = Taken By, (c) = Cosigned By      Initials Name Provider Type    SR Ida Herndon, OT Occupational Therapist                   Goals/Plan       Row Name 05/26/24 1636          Bathing Goal 1 (OT)    Activity/Device (Bathing Goal 1, OT) bathing skills, all  -SR     [place] Level/Cues Needed (Bathing Goal 1, OT) minimum assist (75% or more patient effort)  -SR     Time Frame (Bathing Goal 1, OT) 2 weeks  -SR       [facility] Name 05/26/24 1636          Toileting Goal 1 (OT)    Activity/Device (Toileting Goal 1, OT) toileting skills, all  -SR     [place] Level/Cues Needed (Toileting Goal 1, OT) minimum assist (75% or more patient effort)  -SR     Time Frame (Toileting Goal 1, OT) 2 weeks  -SR       Row Name 05/26/24 1636          Therapy Assessment/Plan (OT)    Planned Therapy Interventions (OT) activity tolerance training;BADL retraining;IADL retraining;functional balance retraining;neuromuscular control/coordination retraining;ROM/therapeutic exercise;transfer/mobility retraining;strengthening exercise;occupation/activity based interventions  -SR               User Key  (r) = Recorded By, (t) = Taken By, (c) = Cosigned By      Initials Name Provider Type    SR Ida Herndon, OT Occupational Therapist                   Clinical Impression       Row Name 05/26/24 1638           Pain Assessment    Pretreatment Pain Rating 0/10 - no pain  -SR     Posttreatment Pain Rating 0/10 - no pain  -SR       Row Name 05/26/24 1630          Plan of Care Review    Outcome Evaluation 79 y.o. female with a h/o chronic HFpEF, atrial fibrillation, COPD, non-obstructive CAD, asthma, HTN, kidney transplant and lung cancer who presented to the ER with shortness of breath and weakness.  She was also noted to have bilateral lower extremity edema.  Patient was recently hospitalized for UTI and A fib with RVR.  She was discharged on 5/22/24, and she refused discharge to a SNF as recommended.  She also refused home health and outpatient PT.  Pt states generally she is independent with mobility, ambulation with RWx and has had recent falls.  This date pt fatigues quickly with activity and O2 sats drop.  She tremors with fatigue.  She is not safe to return home and will require SNF at discharge.  -SR       Row Name 05/26/24 1636          Therapy Assessment/Plan (OT)    Rehab Potential (OT) good, to achieve stated therapy goals  -SR     Criteria for Skilled Therapeutic Interventions Met (OT) yes;skilled treatment is necessary  -SR     Therapy Frequency (OT) 3 times/wk  -SR       Row Name 05/26/24 163          Therapy Plan Review/Discharge Plan (OT)    Anticipated Discharge Disposition (OT) skilled nursing facility  -SR       Row Name 05/26/24 1633          Positioning and Restraints    Pre-Treatment Position sitting in chair/recliner  -SR     Post Treatment Position chair  -SR     In Chair call light within reach;encouraged to call for assist;exit alarm on  -SR               User Key  (r) = Recorded By, (t) = Taken By, (c) = Cosigned By      Initials Name Provider Type    SR Ida Herndon, OT Occupational Therapist                   Outcome Measures       Row Name 05/26/24 1600 05/26/24 0865       How much help from another person do you currently need...    Turning from your back to your side while in  flat bed without using bedrails? 3  -EL 3  -TK    Moving from lying on back to sitting on the side of a flat bed without bedrails? 3  -EL 2  -TK    Moving to and from a bed to a chair (including a wheelchair)? 2  -EL 2  -TK    Standing up from a chair using your arms (e.g., wheelchair, bedside chair)? 3  -EL 2  -TK    Climbing 3-5 steps with a railing? 1  -EL 1  -TK    To walk in hospital room? 3  -EL 1  -TK    AM-PAC 6 Clicks Score (PT) 15  -EL 11  -TK    Highest Level of Mobility Goal 4 --> Transfer to chair/commode  -EL 4 --> Transfer to chair/commode  -TK      Row Name 05/26/24 1600          Functional Assessment    Outcome Measure Options AM-PAC 6 Clicks Basic Mobility (PT)  -EL               User Key  (r) = Recorded By, (t) = Taken By, (c) = Cosigned By      Initials Name Provider Type    Max Jarvis, PT Physical Therapist    Araceli Mast RN Registered Nurse                    Occupational Therapy Education       Title: PT OT SLP Therapies (In Progress)       Topic: Occupational Therapy (In Progress)       Point: ADL training (In Progress)       Description:   Instruct learner(s) on proper safety adaptation and remediation techniques during self care or transfers.   Instruct in proper use of assistive devices.                  Learning Progress Summary             Patient Acceptance, E,TB, NR by SR at 5/26/2024 1426                         Point: Home exercise program (Not Started)       Description:   Instruct learner(s) on appropriate technique for monitoring, assisting and/or progressing therapeutic exercises/activities.                  Learner Progress:  Not documented in this visit.              Point: Precautions (Not Started)       Description:   Instruct learner(s) on prescribed precautions during self-care and functional transfers.                  Learner Progress:  Not documented in this visit.              Point: Body mechanics (In Progress)       Description:   Instruct learner(s) on  proper positioning and spine alignment during self-care, functional mobility activities and/or exercises.                  Learning Progress Summary             Patient Acceptance, E,TB, NR by SR at 5/26/2024 1025                                         User Key       Initials Effective Dates Name Provider Type Discipline     06/16/21 -  Ida Herndon, OT Occupational Therapist OT                  OT Recommendation and Plan  Planned Therapy Interventions (OT): activity tolerance training, BADL retraining, IADL retraining, functional balance retraining, neuromuscular control/coordination retraining, ROM/therapeutic exercise, transfer/mobility retraining, strengthening exercise, occupation/activity based interventions  Therapy Frequency (OT): 3 times/wk  Plan of Care Review  Outcome Evaluation: 79 y.o. female with a h/o chronic HFpEF, atrial fibrillation, COPD, non-obstructive CAD, asthma, HTN, kidney transplant and lung cancer who presented to the ER with shortness of breath and weakness.  She was also noted to have bilateral lower extremity edema.  Patient was recently hospitalized for UTI and A fib with RVR.  She was discharged on 5/22/24, and she refused discharge to a SNF as recommended.  She also refused home health and outpatient PT.  Pt states generally she is independent with mobility, ambulation with RWx and has had recent falls.  This date pt fatigues quickly with activity and O2 sats drop.  She tremors with fatigue.  She is not safe to return home and will require SNF at discharge.     Time Calculation:         Time Calculation- OT       Row Name 05/26/24 2337             Time Calculation- OT    OT Start Time 1302  -SR      OT Stop Time 1320  -SR      OT Time Calculation (min) 18 min  -SR      Total Timed Code Minutes- OT 0 minute(s)  -SR      OT Received On 05/26/24  -SR      OT - Next Appointment 05/28/24  -SR      OT Goal Re-Cert Due Date 06/09/24  -SR                User Key  (r) = Recorded  By, (t) = Taken By, (c) = Cosigned By      Initials Name Provider Type    SR Ida Herndon, OT Occupational Therapist                  Therapy Charges for Today       Code Description Service Date Service Provider Modifiers Qty    [id number] HC OT EVAL MOD COMPLEXITY 4 5/26/2024 Ida Herndon OT GO 1                 Ida Herndon OT  5/26/2024

## 2024-05-27 LAB
ANION GAP SERPL CALCULATED.3IONS-SCNC: 8.4 MMOL/L (ref 5–15)
BASOPHILS # BLD AUTO: 0.05 10*3/MM3 (ref 0–0.2)
BASOPHILS NFR BLD AUTO: 0.8 % (ref 0–1.5)
BUN SERPL-MCNC: 20 MG/DL (ref 8–23)
BUN/CREAT SERPL: 22.5 (ref 7–25)
CALCIUM SPEC-SCNC: 9.8 MG/DL (ref 8.6–10.5)
CHLORIDE SERPL-SCNC: 97 MMOL/L (ref 98–107)
CO2 SERPL-SCNC: 34.6 MMOL/L (ref 22–29)
CREAT SERPL-MCNC: 0.89 MG/DL (ref 0.57–1)
DEPRECATED RDW RBC AUTO: 62.4 FL (ref 37–54)
EGFRCR SERPLBLD CKD-EPI 2021: 66.9 ML/MIN/1.73
EOSINOPHIL # BLD AUTO: 0.13 10*3/MM3 (ref 0–0.4)
EOSINOPHIL NFR BLD AUTO: 2.1 % (ref 0.3–6.2)
ERYTHROCYTE [DISTWIDTH] IN BLOOD BY AUTOMATED COUNT: 16.9 % (ref 12.3–15.4)
GLUCOSE SERPL-MCNC: 105 MG/DL (ref 65–99)
HCT VFR BLD AUTO: 36 % (ref 34–46.6)
HGB BLD-MCNC: 10.2 G/DL (ref 12–15.9)
IMM GRANULOCYTES # BLD AUTO: 0.04 10*3/MM3 (ref 0–0.05)
IMM GRANULOCYTES NFR BLD AUTO: 0.6 % (ref 0–0.5)
LYMPHOCYTES # BLD AUTO: 0.64 10*3/MM3 (ref 0.7–3.1)
LYMPHOCYTES NFR BLD AUTO: 10.1 % (ref 19.6–45.3)
MAGNESIUM SERPL-MCNC: 1.9 MG/DL (ref 1.6–2.4)
MCH RBC QN AUTO: 28.7 PG (ref 26.6–33)
MCHC RBC AUTO-ENTMCNC: 28.3 G/DL (ref 31.5–35.7)
MCV RBC AUTO: 101.4 FL (ref 79–97)
MONOCYTES # BLD AUTO: 0.75 10*3/MM3 (ref 0.1–0.9)
MONOCYTES NFR BLD AUTO: 11.8 % (ref 5–12)
NEUTROPHILS NFR BLD AUTO: 4.73 10*3/MM3 (ref 1.7–7)
NEUTROPHILS NFR BLD AUTO: 74.6 % (ref 42.7–76)
NRBC BLD AUTO-RTO: 0 /100 WBC (ref 0–0.2)
PLATELET # BLD AUTO: 168 10*3/MM3 (ref 140–450)
PMV BLD AUTO: 9.3 FL (ref 6–12)
POTASSIUM SERPL-SCNC: 4.3 MMOL/L (ref 3.5–5.2)
RBC # BLD AUTO: 3.55 10*6/MM3 (ref 3.77–5.28)
SODIUM SERPL-SCNC: 140 MMOL/L (ref 136–145)
WBC NRBC COR # BLD AUTO: 6.34 10*3/MM3 (ref 3.4–10.8)

## 2024-05-27 PROCEDURE — 85025 COMPLETE CBC W/AUTO DIFF WBC: CPT | Performed by: INTERNAL MEDICINE

## 2024-05-27 PROCEDURE — 25010000002 CEFTRIAXONE PER 250 MG: Performed by: INTERNAL MEDICINE

## 2024-05-27 PROCEDURE — 80048 BASIC METABOLIC PNL TOTAL CA: CPT | Performed by: INTERNAL MEDICINE

## 2024-05-27 PROCEDURE — 83735 ASSAY OF MAGNESIUM: CPT | Performed by: INTERNAL MEDICINE

## 2024-05-27 PROCEDURE — 63710000001 TACROLIMUS PER 1 MG: Performed by: INTERNAL MEDICINE

## 2024-05-27 PROCEDURE — 99232 SBSQ HOSP IP/OBS MODERATE 35: CPT | Performed by: STUDENT IN AN ORGANIZED HEALTH CARE EDUCATION/TRAINING PROGRAM

## 2024-05-27 PROCEDURE — 63710000001 ONDANSETRON ODT 4 MG TABLET DISPERSIBLE: Performed by: NURSE PRACTITIONER

## 2024-05-27 PROCEDURE — 63710000001 PREDNISONE PER 5 MG: Performed by: INTERNAL MEDICINE

## 2024-05-27 RX ORDER — DOCUSATE SODIUM 100 MG/1
100 CAPSULE, LIQUID FILLED ORAL 2 TIMES DAILY
Status: DISCONTINUED | OUTPATIENT
Start: 2024-05-27 | End: 2024-06-03 | Stop reason: HOSPADM

## 2024-05-27 RX ORDER — METOCLOPRAMIDE 5 MG/1
5 TABLET ORAL
Status: DISCONTINUED | OUTPATIENT
Start: 2024-05-27 | End: 2024-06-03 | Stop reason: HOSPADM

## 2024-05-27 RX ORDER — POLYETHYLENE GLYCOL 3350 17 G/17G
17 POWDER, FOR SOLUTION ORAL DAILY
Status: DISCONTINUED | OUTPATIENT
Start: 2024-05-27 | End: 2024-06-03 | Stop reason: HOSPADM

## 2024-05-27 RX ADMIN — DILTIAZEM HYDROCHLORIDE 90 MG: 60 TABLET, FILM COATED ORAL at 05:13

## 2024-05-27 RX ADMIN — Medication 10 ML: at 08:56

## 2024-05-27 RX ADMIN — Medication 10 ML: at 20:31

## 2024-05-27 RX ADMIN — GUAIFENESIN 1200 MG: 600 TABLET, EXTENDED RELEASE ORAL at 08:57

## 2024-05-27 RX ADMIN — DILTIAZEM HYDROCHLORIDE 90 MG: 60 TABLET, FILM COATED ORAL at 12:45

## 2024-05-27 RX ADMIN — DOCUSATE SODIUM 100 MG: 100 CAPSULE, LIQUID FILLED ORAL at 20:30

## 2024-05-27 RX ADMIN — FUROSEMIDE 80 MG: 40 TABLET ORAL at 17:31

## 2024-05-27 RX ADMIN — DIAZEPAM 5 MG: 5 TABLET ORAL at 08:57

## 2024-05-27 RX ADMIN — HYDROCODONE BITARTRATE AND ACETAMINOPHEN 1 TABLET: 5; 325 TABLET ORAL at 00:37

## 2024-05-27 RX ADMIN — TACROLIMUS 1 MG: 1 CAPSULE ORAL at 20:30

## 2024-05-27 RX ADMIN — METOCLOPRAMIDE 5 MG: 5 TABLET ORAL at 12:44

## 2024-05-27 RX ADMIN — FUROSEMIDE 80 MG: 40 TABLET ORAL at 08:57

## 2024-05-27 RX ADMIN — FAMOTIDINE 20 MG: 20 TABLET, FILM COATED ORAL at 08:57

## 2024-05-27 RX ADMIN — METOCLOPRAMIDE 5 MG: 5 TABLET ORAL at 16:54

## 2024-05-27 RX ADMIN — DULOXETINE HYDROCHLORIDE 40 MG: 20 CAPSULE, DELAYED RELEASE ORAL at 08:57

## 2024-05-27 RX ADMIN — DILTIAZEM HYDROCHLORIDE 90 MG: 60 TABLET, FILM COATED ORAL at 20:32

## 2024-05-27 RX ADMIN — HYDROCODONE BITARTRATE AND ACETAMINOPHEN 1 TABLET: 5; 325 TABLET ORAL at 08:57

## 2024-05-27 RX ADMIN — APIXABAN 5 MG: 5 TABLET, FILM COATED ORAL at 08:57

## 2024-05-27 RX ADMIN — CEFTRIAXONE SODIUM 2000 MG: 2 INJECTION, POWDER, FOR SOLUTION INTRAMUSCULAR; INTRAVENOUS at 15:38

## 2024-05-27 RX ADMIN — GUAIFENESIN 1200 MG: 600 TABLET, EXTENDED RELEASE ORAL at 20:30

## 2024-05-27 RX ADMIN — POLYETHYLENE GLYCOL 3350 17 G: 17 POWDER, FOR SOLUTION ORAL at 12:43

## 2024-05-27 RX ADMIN — APIXABAN 5 MG: 5 TABLET, FILM COATED ORAL at 20:29

## 2024-05-27 RX ADMIN — DIAZEPAM 5 MG: 5 TABLET ORAL at 20:29

## 2024-05-27 RX ADMIN — METOPROLOL SUCCINATE 50 MG: 50 TABLET, EXTENDED RELEASE ORAL at 08:57

## 2024-05-27 RX ADMIN — FERROUS SULFATE TAB EC 324 MG (65 MG FE EQUIVALENT) 324 MG: 324 (65 FE) TABLET DELAYED RESPONSE at 08:57

## 2024-05-27 RX ADMIN — CHOLESTYRAMINE 4 G: 4 POWDER, FOR SUSPENSION ORAL at 08:57

## 2024-05-27 RX ADMIN — PREDNISONE 5 MG: 5 TABLET ORAL at 08:57

## 2024-05-27 RX ADMIN — TACROLIMUS 1 MG: 1 CAPSULE ORAL at 08:57

## 2024-05-27 RX ADMIN — POTASSIUM CHLORIDE 40 MEQ: 1500 TABLET, EXTENDED RELEASE ORAL at 08:57

## 2024-05-27 RX ADMIN — ONDANSETRON 4 MG: 4 TABLET, ORALLY DISINTEGRATING ORAL at 12:44

## 2024-05-27 RX ADMIN — DOCUSATE SODIUM 100 MG: 100 CAPSULE, LIQUID FILLED ORAL at 12:44

## 2024-05-27 RX ADMIN — METOCLOPRAMIDE 5 MG: 5 TABLET ORAL at 20:30

## 2024-05-27 RX ADMIN — HYDROCODONE BITARTRATE AND ACETAMINOPHEN 1 TABLET: 5; 325 TABLET ORAL at 20:30

## 2024-05-27 RX ADMIN — LEVOTHYROXINE SODIUM 50 MCG: 0.05 TABLET ORAL at 05:13

## 2024-05-27 NOTE — PROGRESS NOTES
Referring Provider: Juanis Lowe MD       SUBJECTIVE    Patient has ESBL E. coli infection  Diuresing well       ROS  Review of all systems negative except as indicated above    Personal History:    Past Medical History:   Diagnosis Date    Allergic rhinitis 04/14/2015    Asthma 08/10/2017    Atrial flutter 05/11/2017    Chronic diarrhea 04/15/2019    Chronic hypoxemic respiratory failure 01/18/2024    Chronic pain 02/03/2015    COPD (chronic obstructive pulmonary disease)     COVID-19 virus detected 08/11/2022    Cytokine release syndrome, grade 1 08/16/2022    Edema of both lower extremities due to peripheral venous insufficiency 03/12/2021    ESRD on hemodialysis 05/22/2013    Essential (primary) hypertension 03/03/2022    Fracture of ulnar styloid 05/19/2022    Fracture of unspecified carpal bone, right wrist, subsequent encounter for fracture with routine healing 03/03/2022    Gastroesophageal reflux disease 11/12/2020    Gout 08/10/2017    Hearing loss 08/10/2017    History of appendectomy     History of DVT (deep vein thrombosis) 11/12/2020    History of kidney transplant 06/30/2017    History of lobectomy of lung 01/18/2024 03/08/2016:  RIGHT Lower Lobe Mass--> Right Video-assisted thoracoscopy with a moderate-to-large wedge resection of the RLL (by Dr. Haris Mcconnell @ [facility])--> Poorly differentiated carcinoma of the RLL.      History of repair of hip joint 05/21/2013    History of suicide attempt 05/20/2024    Hyperlipidemia 08/11/2014    Hypothyroidism, unspecified 03/03/2022    Infection due to extended spectrum beta lactamase (ESBL) producing bacteria 03/11/2016    No A2K system hx. +ESBL E coli urine on 3/11/16.      Irritable bowel syndrome 04/15/2019    Long term (current) use of immunosuppressive biologic 04/28/2021    Long term current use of anticoagulant therapy 01/18/2024    Malignant neoplasm of lung 08/10/2017    Menopausal flushing 08/30/2021    Mitral valve regurgitation  07/06/2015    Mixed anxiety and depressive disorder 04/30/2015    Non-small cell lung cancer 08/10/2017    Nonobstructive atherosclerosis of coronary artery 06/02/2019 08/12/2022: CATH: [facility]: NSTEMI assoc with Covid-19: LM:-nl;  LAD: diffuse, Ca++; 30%; CIRC: Dominant. Normal; RCA: small; 50% diffuse, proximal and mid-segment.      NSTEMI (non-ST elevated myocardial infarction) 08/11/2022    Obsessive-compulsive disorder 04/15/2019    Osteoarthritis 05/20/2024    Paroxysmal atrial fibrillation 05/11/2017    Paroxysmal supraventricular tachycardia 05/11/2017    Peripheral neuropathy 08/11/2014    Personal history of peptic ulcer disease 11/12/2020    Postoperative anemia due to acute blood loss 05/22/2013    Right wrist fracture 03/01/2022    Seasonal allergies 08/10/2017    Secondary hyperparathyroidism of renal origin 09/14/2015    Tricuspid valve regurgitation 03/15/2017    Ulcer of lower extremity 08/30/2021    Unspecified acute appendicitis 03/03/2022    Valvular heart disease 05/20/2024    Wegener's granulomatosis with renal involvement 03/03/2022       Past Surgical History:   Procedure Laterality Date    APPENDECTOMY      BREAST SURGERY Left     cysts rmeoved    CARDIAC CATHETERIZATION Right 08/12/2022    Procedure: Left Heart Cath and coronary angiogram;  Surgeon: Jak Gavin MD;  Location: [facility] CATH INVASIVE LOCATION;  Service: Cardiology;  Laterality: Right;    CLOSED REDUCTION WRIST FRACTURE Right 03/01/2022    Procedure: WRIST CLOSED REDUCTION;  Surgeon: Gaudencio Townsend MD;  Location: [facility] MAIN OR;  Service: Orthopedics;  Laterality: Right;    CYSTOSCOPY      HIP ARTHROPLASTY      HYSTERECTOMY      LUNG LOBECTOMY Right     TRANSPLANTATION RENAL         Family History   Problem Relation Age of Onset    No Known Problems Mother     No Known Problems Father        Social History     Tobacco Use    Smoking status: Former    Smokeless tobacco: Never   Vaping Use    Vaping status: Former    Substance Use Topics    Alcohol use: Never    Drug use: Never        Home meds:  Prior to Admission medications    Medication Sig Start Date End Date Taking? Authorizing Provider   acetaminophen (Tylenol) 325 MG tablet Take 2 tablets by mouth Every 4 (Four) Hours As Needed for Fever or Mild Pain. 3/13/20   Court Vences MD   albuterol sulfate  (90 Base) MCG/ACT inhaler Inhale 2 puffs Every 6 (Six) Hours As Needed for Wheezing.    Court Vences MD   apixaban (ELIQUIS) 5 MG tablet tablet Take 1 tablet by mouth Every 12 (Twelve) Hours. 8/16/22   Clyde White DO   Carboxymethylcellulose Sodium (DRY EYE RELIEF OP) Apply 2 drops to eye(s) as directed by provider 2 (Two) Times a Day As Needed (dry eyes). 5/11/22   Court Vences MD   colestipol (COLESTID) 1 g tablet Take 1 tablet by mouth Daily.    Court Vences MD   diazePAM (VALIUM) 5 MG tablet Take 5 mg by mouth 2 (Two) Times a Day As Needed for Anxiety.    Court Vences MD   Diclofenac Sodium (Aspercreme Arthritis Pain) 1 % gel gel Apply 4 g topically to the appropriate area as directed 2 (Two) Times a Day As Needed (pain). 10/21/20   Court Vences MD   dilTIAZem (CARDIZEM) 90 MG tablet Take 1 tablet by mouth Every 8 (Eight) Hours. 5/22/24   Mariangel Stearns APRN   DULoxetine HCl 40 MG capsule delayed-release particles Take 1 capsule by mouth Daily.    Court Vences MD   ferrous sulfate 324 (65 Fe) MG tablet delayed-release EC tablet Take 1 tablet by mouth Daily With Breakfast. 5/23/24   Mariangel Stearns APRN   furosemide (LASIX) 80 MG tablet Take 1 tablet by mouth 2 (Two) Times a Day. 5/22/24   Mariangel Stearns APRN   guaiFENesin (Mucinex) 600 MG 12 hr tablet Take 1,200 mg by mouth 2 (Two) Times a Day. 8/16/22   Court Vences MD   levothyroxine (SYNTHROID, LEVOTHROID) 50 MCG tablet Take 1 tablet by mouth Daily.    Court Vences MD   loperamide (IMODIUM) 2 MG capsule  "Take 2 mg by mouth 4 (Four) Times a Day As Needed for Diarrhea. 3/13/20   Court Vences MD   metoprolol succinate XL (TOPROL-XL) 50 MG 24 hr tablet Take 50 mg by mouth Daily.    Court Vences MD   Tpeqt-Vxkhm-Fpudtuj-Pramoxine (Neosporin + Pain Relief Max St) 1 % ointment Apply 1 application topically to the appropriate area as directed 2 (Two) Times a Day As Needed (sores). 7/2/20   Court Vences MD   potassium chloride (KLOR-CON M20) 20 MEQ CR tablet Take 2 tablets by mouth Daily. 5/22/24   DarynMariangel hargrove APRN   predniSONE (DELTASONE) 5 MG tablet Take 5 mg by mouth Daily.    Court Vences MD   Salicylic Acid-Urea (KERASAL EX) Apply 1 application topically to the appropriate area as directed 2 (Two) Times a Day As Needed (dry feet). 11/23/19   Court Vences MD   tacrolimus (PROGRAF) 1 MG capsule Take 1 mg by mouth 2 (Two) Times a Day.    Court Vences MD       Allergies:  Zolpidem      OBJECTIVE    Vital Signs  Vitals:    05/27/24 0424 05/27/24 0526 05/27/24 0808 05/27/24 1134   BP: 118/61  138/62 139/59   BP Location: Left arm  Left arm Left arm   Patient Position: Lying  Lying Lying   Pulse: 74   69   Resp: 22  24 25   Temp: 98.1 °F (36.7 °C)  96.6 °F (35.9 °C) 97.5 °F (36.4 °C)   TempSrc: Oral  Axillary Oral   SpO2: 100%   93%   Weight:  84.9 kg (187 lb 2.7 oz)     Height:           Flowsheet Rows      Flowsheet Row First Filed Value   Admission Height 167.6 cm (66\") Documented at 05/24/2024 1143   Admission Weight 86.2 kg (190 lb) Documented at 05/24/2024 1143              Intake/Output Summary (Last 24 hours) at 5/27/2024 1608  Last data filed at 5/27/2024 1510  Gross per 24 hour   Intake 657 ml   Output 925 ml   Net -268 ml              Physical Exam:  General-no acute distress  No elevated JVP noted.  Cardiovascular-S1-S2 normal, no murmurs noted.  Respiratory-normal breath sounds, no wheezing/crackles.  GI-abdomen is soft and nontender  No pedal " edema        Results Review:    BNP        TROPONIN  Results from last 7 days   Lab Units 05/24/24  1408   HSTROP T ng/L 35*       CoAg  Results from last 7 days   Lab Units 05/24/24  1210   INR  1.16*   APTT seconds 30.9*       Creatinine Clearance  Estimated Creatinine Clearance: 58.1 mL/min (by C-G formula based on SCr of 0.89 mg/dL).      Radiology  No radiology results for the last day      EKG  I personally viewed and interpreted the patient's EKG/Telemetry data:  ECG 12 Lead Dyspnea   Final Result   HEART RATE= 82  bpm   RR Interval= 732  ms   CA Interval=   ms   P Horizontal Axis=   deg   P Front Axis=   deg   QRSD Interval= 88  ms   QT Interval= 407  ms   QTcB= 476  ms   QRS Axis= -32  deg   T Wave Axis= 49  deg   - ABNORMAL ECG -   Atrial fibrillation   Left axis deviation   When compared with ECG of 20-May-2024 9:37:54,   Significant rate increase   Significant repolarization change   Electronically Signed By: Praneeth Theodore (Derek) 25-May-2024 07:53:06   Date and Time of Study: 2024-05-24 11:36:06      Telemetry Scan   Final Result      Telemetry Scan   Final Result      Telemetry Scan   Final Result      Telemetry Scan   Final Result      Telemetry Scan   Final Result            Echocardiogram:    Results for orders placed during the hospital encounter of 05/17/24    Adult Transthoracic Echo Complete W/ Cont if Necessary Per Protocol    Interpretation Summary    Left ventricular systolic function is normal. Calculated left ventricular EF = 55% Left ventricular ejection fraction appears to be 51 - 55%.    Left ventricular diastolic function was indeterminate.    Left atrial volume is moderately increased.    The right atrial cavity is dilated.    Severe tricuspid valve regurgitation is present.    Estimated right ventricular systolic pressure from tricuspid regurgitation is markedly elevated (>55 mmHg).    Mild to moderate mitral valve regurgitation is present        Stress Test:         Cardiac  Catheterization:  Results for orders placed during the hospital encounter of 22    Cardiac Catheterization/Vascular Study    Conclusion  IMPRESSIONS  Non obstructive CAD         Other:         ASSESSMENT & PLAN:      Acute exacerbation of heart failure with preserved ejection fraction  Pulmonary hypertension/severe tricuspid valve regurgitation  Appears close to euvolemia, p.o. diuretics as per nephrology team     History of atrial fibrillation  Elevated [id number] score  Continue home Eliquis 5 mg twice daily  Rate controlled on diltiazem and metoprolol     ESRD status post  donor transplant  Nephrology team following  Continue prednisone and Prograf     Nonobstructive coronary artery disease, coronary angiogram performed in  with Dr. Gavin     Primary cardiologist Dr. Gavin will follow from tomorrow        Part of this note may be an electronic transcription/translation of spoken language to printed text using the Dragon Dictation System.    Keturah Wills MD  24  16:08 EDT

## 2024-05-27 NOTE — PROGRESS NOTES
: the pt. Feeling better , denies complaints    Vital Signs  Vitals:    05/27/24 1134   BP: 139/59   Pulse: 69   Resp: 25   Temp: 97.5 °F (36.4 °C)   SpO2: 93%     No intake/output data recorded.  I/O last 3 completed shifts:  In: 897 [P.O.:897]  Out: 525 [Urine:525]      Physical Exam:    General: in nad  HEENT:  Normocephalic, Atraumatic, EOMI, PERRLA, NO icterus,  Neck:  Supple, No JVD, No Carotid artery bruit, No lymphadenopathy  Chest: Clear to auscultation bilaterally,   Cardiovascular: Regular rate and rhythm. Positive S1 & S2, No rub, murmur or gallop.  Abdominal: Soft, nontender, no palpable organomegaly, no abdominal bruit, positive bowel sounds  Musculoskeletal: No joint tenderness or swelling, good range of motion, Adequate muscle strength on both sides, no cyanosis, clubbing positive edema on lower extremities.  Neuro: Alert oriented x3, CN1 - 12 intact, No focal sensory or motor deficit.      Review of Systems:   CNS: Denied headaches, blurred vision, tingling or numbness in any part of body. No weakness or any impaired speech.  CVS: No chest pain or shortness of breath, No orthopnea or PND or exertional dyspnea,  Pulmonary: Denies shortness of breath, coughs, hematemesis, night sweats or weight loss.  GI: Denies diarrhea, nausea, vomiting. Denies weight loss, hematemesis, melena.  : Denies dysuria, frequency or hesitancy of urination  Vascular: Denies claudication, resting pain, tingling numbness or weakness in any part of the body.  Musculoskeletal: Denies Joint tenderness or pain, denies stiffness in joints, denies fever or weight loss, or skin rashes.    Current Labs  [unfilled]  Lab Results (last 24 hours)       Procedure Component Value Units Date/Time    Magnesium [[id number]]  (Normal) Collected: 05/27/24 0526    Specimen: Blood from Hand, Left Updated: 05/27/24 0626     Magnesium 1.9 mg/dL     Basic Metabolic Panel [[id number]]  (Abnormal) Collected: 05/27/24 0526    Specimen: Blood from  Hand, Left Updated: 05/27/24 0626     Glucose 105 mg/dL      BUN 20 mg/dL      Creatinine 0.89 mg/dL      Sodium 140 mmol/L      Potassium 4.3 mmol/L      Chloride 97 mmol/L      CO2 34.6 mmol/L      Calcium 9.8 mg/dL      BUN/Creatinine Ratio 22.5     Anion Gap 8.4 mmol/L      eGFR 66.9 mL/min/1.73     Narrative:      GFR Normal >60  Chronic Kidney Disease <60  Kidney Failure <15    The GFR formula is only valid for adults with stable renal function between ages 18 and 70.    CBC & Differential [[id number]]  (Abnormal) Collected: 05/27/24 0526    Specimen: Blood from Hand, Left Updated: 05/27/24 0610    Narrative:      The following orders were created for panel order CBC & Differential.  Procedure                               Abnormality         Status                     ---------                               -----------         ------                     CBC Auto Differential[[id number]]        Abnormal            Final result                 Please view results for these tests on the individual orders.    CBC Auto Differential [[id number]]  (Abnormal) Collected: 05/27/24 0526    Specimen: Blood from Hand, Left Updated: 05/27/24 0610     WBC 6.34 10*3/mm3      RBC 3.55 10*6/mm3      Hemoglobin 10.2 g/dL      Hematocrit 36.0 %      .4 fL      MCH 28.7 pg      MCHC 28.3 g/dL      RDW 16.9 %      RDW-SD 62.4 fl      MPV 9.3 fL      Platelets 168 10*3/mm3      Neutrophil % 74.6 %      Lymphocyte % 10.1 %      Monocyte % 11.8 %      Eosinophil % 2.1 %      Basophil % 0.8 %      Immature Grans % 0.6 %      Neutrophils, Absolute 4.73 10*3/mm3      Lymphocytes, Absolute 0.64 10*3/mm3      Monocytes, Absolute 0.75 10*3/mm3      Eosinophils, Absolute 0.13 10*3/mm3      Basophils, Absolute 0.05 10*3/mm3      Immature Grans, Absolute 0.04 10*3/mm3      nRBC 0.0 /100 WBC     Blood Culture - Blood, Arm, Left [[id number]]  (Normal) Collected: 05/24/24 1311    Specimen: Blood from Arm, Left Updated: 05/26/24 1317     Blood  Culture No growth at 2 days    Blood Culture - Blood, Arm, Left [[id number]]  (Normal) Collected: 05/24/24 1251    Specimen: Blood from Arm, Left Updated: 05/26/24 1300     Blood Culture No growth at 2 days          Current Radiology  Imaging Results (Last 24 Hours)       ** No results found for the last 24 hours. **          Current Meds    Current Facility-Administered Medications:     acetaminophen (TYLENOL) tablet 650 mg, 650 mg, Oral, Q4H PRN, Janene Archer APRN    apixaban (ELIQUIS) tablet 5 mg, 5 mg, Oral, Q12H, Juanis Lowe MD, 5 mg at 05/27/24 0857    Calcium Replacement - Follow Nurse / BPA Driven Protocol, , Does not apply, PRN, Janene Archer APRN    carboxymethylcellulose sod 1 % eye gel 1 drop, 1 drop, Both Eyes, TID PRN, Juanis Lowe MD    cefTRIAXone (ROCEPHIN) 2,000 mg in sodium chloride 0.9 % 100 mL MBP, 2,000 mg, Intravenous, Q24H, Barry Mckinley MD, Last Rate: 200 mL/hr at 05/26/24 1524, 2,000 mg at 05/26/24 1524    cholestyramine light packet 4 g, 1 packet, Oral, Daily, Juanis Lowe MD, 4 g at 05/27/24 0857    diazePAM (VALIUM) tablet 5 mg, 5 mg, Oral, BID PRN, Juanis Lowe MD, 5 mg at 05/27/24 0857    Diclofenac Sodium (VOLTAREN) 1 % gel 4 g, 4 g, Topical, BID PRN, Juanis Lowe MD    dilTIAZem (CARDIZEM) tablet 90 mg, 90 mg, Oral, Q8H, Juanis Lowe MD, 90 mg at 05/27/24 0513    docusate sodium (COLACE) capsule 100 mg, 100 mg, Oral, BID, Janene Archer APRN    DULoxetine (CYMBALTA) DR capsule 40 mg, 40 mg, Oral, Daily, Juanis Lowe MD, 40 mg at 05/27/24 0857    famotidine (PEPCID) tablet 20 mg, 20 mg, Oral, Daily, Juanis Lowe MD, 20 mg at 05/27/24 0857    ferrous sulfate EC tablet 324 mg, 324 mg, Oral, Daily With Breakfast, Juanis Lowe MD, 324 mg at 05/27/24 0857    furosemide (LASIX) tablet 80 mg, 80 mg, Oral, BID, Alexandro Landry MD, 80 mg at 05/27/24 0857    guaiFENesin (MUCINEX) 12 hr tablet 1,200 mg, 1,200 mg, Oral, BID, Juanis Lowe MD, 1,200 mg at 05/27/24 0857    hydrALAZINE  (APRESOLINE) injection 10 mg, 10 mg, Intravenous, Q6H PRN, Janene Archer APRN    HYDROcodone-acetaminophen (NORCO) 5-325 MG per tablet 1 tablet, 1 tablet, Oral, Q8H PRN, Juanis Lowe MD, 1 tablet at 05/27/24 0857    levothyroxine (SYNTHROID, LEVOTHROID) tablet 50 mcg, 50 mcg, Oral, Q AM, Juanis Lowe MD, 50 mcg at 05/27/24 0513    loperamide (IMODIUM) capsule 2 mg, 2 mg, Oral, 4x Daily PRN, Juanis Lowe MD    Magnesium Standard Dose Replacement - Follow Nurse / BPA Driven Protocol, , Does not apply, PRN, Janene Archer APRN    metoclopramide (REGLAN) tablet 5 mg, 5 mg, Oral, 4x Daily AC & at Bedtime, Janene Archer APRN    metoprolol succinate XL (TOPROL-XL) 24 hr tablet 50 mg, 50 mg, Oral, Daily, Juanis Lowe MD, 50 mg at 05/27/24 0857    nitroglycerin (NITROSTAT) SL tablet 0.4 mg, 0.4 mg, Sublingual, Q5 Min PRN, Janene Archer APRN    ondansetron ODT (ZOFRAN-ODT) disintegrating tablet 4 mg, 4 mg, Oral, Q6H PRN, 4 mg at 05/26/24 1646 **OR** ondansetron (ZOFRAN) injection 4 mg, 4 mg, Intravenous, Q6H PRN, Janene Archer APRN, 4 mg at 05/25/24 1101    Phosphorus Replacement - Follow Nurse / BPA Driven Protocol, , Does not apply, PRN, Janene Archer APRN    polyethylene glycol (MIRALAX) packet 17 g, 17 g, Oral, Daily, Janene Archer APRN    potassium chloride (KLOR-CON M20) CR tablet 40 mEq, 40 mEq, Oral, Daily, Juanis Lowe MD, 40 mEq at 05/27/24 0857    Potassium Replacement - Follow Nurse / BPA Driven Protocol, , Does not apply, PRN, Metter, Janene M, APRN    predniSONE (DELTASONE) tablet 5 mg, 5 mg, Oral, Daily With Breakfast, Juanis Lowe MD, 5 mg at 05/27/24 0857    [COMPLETED] Insert Peripheral IV, , , Once **AND** sodium chloride 0.9 % flush 10 mL, 10 mL, Intravenous, PRN, Mila Theodore APRN    sodium chloride 0.9 % flush 10 mL, 10 mL, Intravenous, Q12H, Janene Archer APRN, 10 mL at 05/27/24 0856    sodium chloride 0.9 % flush 10 mL, 10 mL, Intravenous, PRN, Janene Archer, APRN     sodium chloride 0.9 % infusion 40 mL, 40 mL, Intravenous, PRN, Janene Archer, APRN    tacrolimus (PROGRAF) capsule 1 mg, 1 mg, Oral, BID, Juanis Lowe MD, 1 mg at 24 0857    Assessment and plan            Acute exacerbation of CHF (congestive heart failure)    S/p;  donor kidney transplant, continue immunosuppression   Edema and chf , continue diuretics improving        Alexandro Landry MD FACP, FASN.  24  12:16 EDT

## 2024-05-27 NOTE — PROGRESS NOTES
: ***    Vital Signs  Vitals:    05/27/24 1134   BP: 139/59   Pulse: 69   Resp: 25   Temp: 97.5 °F (36.4 °C)   SpO2: 93%     No intake/output data recorded.  I/O last 3 completed shifts:  In: 897 [P.O.:897]  Out: 525 [Urine:525]      Physical Exam:    General: ***  HEENT:  Normocephalic, Atraumatic, EOMI, PERRLA, NO icterus,  Neck:  Supple, No JVD, No Carotid artery bruit, No lymphadenopathy  Chest: Clear to auscultation bilaterally, {yes no:152656} rhonchi and {yes no:921613} wheezes  Cardiovascular: Regular rate and rhythm. Positive S1 & S2, No rub, murmur or gallop.  Abdominal: Soft, nontender, no palpable organomegaly, no abdominal bruit, positive bowel sounds  Musculoskeletal: No joint tenderness or swelling, good range of motion, Adequate muscle strength on both sides, no cyanosis, clubbing or edema on lower extremities.  Neuro: Alert oriented x3, CN1 - 12 intact, No focal sensory or motor deficit.      Review of Systems:   CNS: Denied headaches, blurred vision, tingling or numbness in any part of body. No weakness or any impaired speech.  CVS: No chest pain or shortness of breath, No orthopnea or PND or exertional dyspnea,  Pulmonary: Denies shortness of breath, coughs, hematemesis, night sweats or weight loss.  GI: Denies diarrhea, nausea, vomiting. Denies weight loss, hematemesis, melena.  : Denies dysuria, frequency or hesitancy of urination  Vascular: Denies claudication, resting pain, tingling numbness or weakness in any part of the body.  Musculoskeletal: Denies Joint tenderness or pain, denies stiffness in joints, denies fever or weight loss, or skin rashes.    Current Labs  [unfilled]  Lab Results (last 24 hours)       Procedure Component Value Units Date/Time    Magnesium [[id number]]  (Normal) Collected: 05/27/24 0526    Specimen: Blood from Hand, Left Updated: 05/27/24 0626     Magnesium 1.9 mg/dL     Basic Metabolic Panel [[id number]]  (Abnormal) Collected: 05/27/24 0526    Specimen: Blood  from Hand, Left Updated: 05/27/24 0626     Glucose 105 mg/dL      BUN 20 mg/dL      Creatinine 0.89 mg/dL      Sodium 140 mmol/L      Potassium 4.3 mmol/L      Chloride 97 mmol/L      CO2 34.6 mmol/L      Calcium 9.8 mg/dL      BUN/Creatinine Ratio 22.5     Anion Gap 8.4 mmol/L      eGFR 66.9 mL/min/1.73     Narrative:      GFR Normal >60  Chronic Kidney Disease <60  Kidney Failure <15    The GFR formula is only valid for adults with stable renal function between ages 18 and 70.    CBC & Differential [[id number]]  (Abnormal) Collected: 05/27/24 0526    Specimen: Blood from Hand, Left Updated: 05/27/24 0610    Narrative:      The following orders were created for panel order CBC & Differential.  Procedure                               Abnormality         Status                     ---------                               -----------         ------                     CBC Auto Differential[[id number]]        Abnormal            Final result                 Please view results for these tests on the individual orders.    CBC Auto Differential [[id number]]  (Abnormal) Collected: 05/27/24 0526    Specimen: Blood from Hand, Left Updated: 05/27/24 0610     WBC 6.34 10*3/mm3      RBC 3.55 10*6/mm3      Hemoglobin 10.2 g/dL      Hematocrit 36.0 %      .4 fL      MCH 28.7 pg      MCHC 28.3 g/dL      RDW 16.9 %      RDW-SD 62.4 fl      MPV 9.3 fL      Platelets 168 10*3/mm3      Neutrophil % 74.6 %      Lymphocyte % 10.1 %      Monocyte % 11.8 %      Eosinophil % 2.1 %      Basophil % 0.8 %      Immature Grans % 0.6 %      Neutrophils, Absolute 4.73 10*3/mm3      Lymphocytes, Absolute 0.64 10*3/mm3      Monocytes, Absolute 0.75 10*3/mm3      Eosinophils, Absolute 0.13 10*3/mm3      Basophils, Absolute 0.05 10*3/mm3      Immature Grans, Absolute 0.04 10*3/mm3      nRBC 0.0 /100 WBC     Blood Culture - Blood, Arm, Left [[id number]]  (Normal) Collected: 05/24/24 1311    Specimen: Blood from Arm, Left Updated: 05/26/24 1317      Blood Culture No growth at 2 days    Blood Culture - Blood, Arm, Left [[id number]]  (Normal) Collected: 05/24/24 1251    Specimen: Blood from Arm, Left Updated: 05/26/24 1300     Blood Culture No growth at 2 days          Current Radiology  Imaging Results (Last 24 Hours)       ** No results found for the last 24 hours. **          Current Meds    Current Facility-Administered Medications:     acetaminophen (TYLENOL) tablet 650 mg, 650 mg, Oral, Q4H PRN, Janene Archer APRN    apixaban (ELIQUIS) tablet 5 mg, 5 mg, Oral, Q12H, Juanis Lowe MD, 5 mg at 05/27/24 0857    Calcium Replacement - Follow Nurse / BPA Driven Protocol, , Does not apply, PRN, Janene Archer APRN    carboxymethylcellulose sod 1 % eye gel 1 drop, 1 drop, Both Eyes, TID PRN, Juanis Lowe MD    cefTRIAXone (ROCEPHIN) 2,000 mg in sodium chloride 0.9 % 100 mL MBP, 2,000 mg, Intravenous, Q24H, Barry Mckinley MD, Last Rate: 200 mL/hr at 05/26/24 1524, 2,000 mg at 05/26/24 1524    cholestyramine light packet 4 g, 1 packet, Oral, Daily, Juanis Lowe MD, 4 g at 05/27/24 0857    diazePAM (VALIUM) tablet 5 mg, 5 mg, Oral, BID PRN, Juanis Lowe MD, 5 mg at 05/27/24 0857    Diclofenac Sodium (VOLTAREN) 1 % gel 4 g, 4 g, Topical, BID PRN, Juanis Lowe MD    dilTIAZem (CARDIZEM) tablet 90 mg, 90 mg, Oral, Q8H, Juanis Lowe MD, 90 mg at 05/27/24 0513    docusate sodium (COLACE) capsule 100 mg, 100 mg, Oral, BID, Janene Archer APRN    DULoxetine (CYMBALTA) DR capsule 40 mg, 40 mg, Oral, Daily, Juanis Lowe MD, 40 mg at 05/27/24 0857    famotidine (PEPCID) tablet 20 mg, 20 mg, Oral, Daily, Juanis Lowe MD, 20 mg at 05/27/24 0857    ferrous sulfate EC tablet 324 mg, 324 mg, Oral, Daily With Breakfast, Juanis Lowe MD, 324 mg at 05/27/24 0857    furosemide (LASIX) tablet 80 mg, 80 mg, Oral, BID, Alexandro Landry MD, 80 mg at 05/27/24 0857    guaiFENesin (MUCINEX) 12 hr tablet 1,200 mg, 1,200 mg, Oral, BID, Juanis Lowe MD, 1,200 mg at 05/27/24 0857     hydrALAZINE (APRESOLINE) injection 10 mg, 10 mg, Intravenous, Q6H PRN, Janene Archer APRN    HYDROcodone-acetaminophen (NORCO) 5-325 MG per tablet 1 tablet, 1 tablet, Oral, Q8H PRN, Juanis Lowe MD, 1 tablet at 05/27/24 0857    levothyroxine (SYNTHROID, LEVOTHROID) tablet 50 mcg, 50 mcg, Oral, Q AM, Juanis Lowe MD, 50 mcg at 05/27/24 0513    loperamide (IMODIUM) capsule 2 mg, 2 mg, Oral, 4x Daily PRN, Juanis Lowe MD    Magnesium Standard Dose Replacement - Follow Nurse / BPA Driven Protocol, , Does not apply, PRN, Janene Archer APRN    metoclopramide (REGLAN) tablet 5 mg, 5 mg, Oral, 4x Daily AC & at Bedtime, Janene Archer APRN    metoprolol succinate XL (TOPROL-XL) 24 hr tablet 50 mg, 50 mg, Oral, Daily, Juanis Lowe MD, 50 mg at 05/27/24 0857    nitroglycerin (NITROSTAT) SL tablet 0.4 mg, 0.4 mg, Sublingual, Q5 Min PRN, Janene Archer APRN    ondansetron ODT (ZOFRAN-ODT) disintegrating tablet 4 mg, 4 mg, Oral, Q6H PRN, 4 mg at 05/26/24 1646 **OR** ondansetron (ZOFRAN) injection 4 mg, 4 mg, Intravenous, Q6H PRN, Janene Archer APRN, 4 mg at 05/25/24 1101    Phosphorus Replacement - Follow Nurse / BPA Driven Protocol, , Does not apply, PRN, Janene Archer APRN    polyethylene glycol (MIRALAX) packet 17 g, 17 g, Oral, Daily, Janene Archer APRN    potassium chloride (KLOR-CON M20) CR tablet 40 mEq, 40 mEq, Oral, Daily, Juanis Lowe MD, 40 mEq at 05/27/24 0857    Potassium Replacement - Follow Nurse / BPA Driven Protocol, , Does not apply, PRN, Janene Archer APRN    predniSONE (DELTASONE) tablet 5 mg, 5 mg, Oral, Daily With Breakfast, Juanis Lowe MD, 5 mg at 05/27/24 0857    [COMPLETED] Insert Peripheral IV, , , Once **AND** sodium chloride 0.9 % flush 10 mL, 10 mL, Intravenous, PRN, Mila Theodore APRN    sodium chloride 0.9 % flush 10 mL, 10 mL, Intravenous, Q12H, Janene Archer APRN, 10 mL at 05/27/24 0856    sodium chloride 0.9 % flush 10 mL, 10 mL, Intravenous, PRN, Janene Archer,  APRN    sodium chloride 0.9 % infusion 40 mL, 40 mL, Intravenous, PRN, Janene Archer, TONYA    tacrolimus (PROGRAF) capsule 1 mg, 1 mg, Oral, BID, Juanis Lowe MD, 1 mg at 05/27/24 0857                Acute exacerbation of CHF (congestive heart failure)      ***        Alexandro Landry MD FACP, FASN.  05/27/24  11:53 EDT

## 2024-05-27 NOTE — PLAN OF CARE
Problem: Adult Inpatient Plan of Care  Goal: Plan of Care Review  Outcome: Ongoing, Progressing  Flowsheets (Taken 5/27/2024 0201)  Progress: improving  Plan of Care Reviewed With: patient  Goal: Patient-Specific Goal (Individualized)  Outcome: Ongoing, Progressing  Goal: Absence of Hospital-Acquired Illness or Injury  Outcome: Ongoing, Progressing  Intervention: Identify and Manage Fall Risk  Recent Flowsheet Documentation  Taken 5/27/2024 0200 by Alicja Hodge RN  Safety Promotion/Fall Prevention:   assistive device/personal items within reach   clutter free environment maintained   nonskid shoes/slippers when out of bed   room organization consistent   safety round/check completed  Taken 5/27/2024 0007 by Alicja Hodge RN  Safety Promotion/Fall Prevention:   assistive device/personal items within reach   clutter free environment maintained   nonskid shoes/slippers when out of bed   room organization consistent   safety round/check completed  Taken 5/26/2024 2212 by Alicja Hodge RN  Safety Promotion/Fall Prevention:   assistive device/personal items within reach   clutter free environment maintained   nonskid shoes/slippers when out of bed   room organization consistent   safety round/check completed  Taken 5/26/2024 2003 by Alicja Hodge RN  Safety Promotion/Fall Prevention:   assistive device/personal items within reach   clutter free environment maintained   nonskid shoes/slippers when out of bed   room organization consistent   safety round/check completed  Intervention: Prevent Skin Injury  Recent Flowsheet Documentation  Taken 5/26/2024 2003 by Alicja Hodge RN  Body Position:   position changed independently   supine  Intervention: Prevent and Manage VTE (Venous Thromboembolism) Risk  Recent Flowsheet Documentation  Taken 5/26/2024 2003 by Alicja Hodge RN  Activity Management: activity encouraged  VTE Prevention/Management: sequential compression devices off  Range of Motion: active ROM  (range of motion) encouraged  Intervention: Prevent Infection  Recent Flowsheet Documentation  Taken 5/27/2024 0200 by Alicja Hodge, RN  Infection Prevention:   environmental surveillance performed   hand hygiene promoted   personal protective equipment utilized   rest/sleep promoted   single patient room provided  Taken 5/27/2024 0007 by Alicja Hodge RN  Infection Prevention:   environmental surveillance performed   hand hygiene promoted   personal protective equipment utilized   single patient room provided  Taken 5/26/2024 2212 by Alicja Hodge RN  Infection Prevention:   environmental surveillance performed   hand hygiene promoted   personal protective equipment utilized   single patient room provided  Taken 5/26/2024 2003 by Alicja Hodge RN  Infection Prevention:   environmental surveillance performed   hand hygiene promoted   single patient room provided  Goal: Optimal Comfort and Wellbeing  Outcome: Ongoing, Progressing  Intervention: Monitor Pain and Promote Comfort  Recent Flowsheet Documentation  Taken 5/27/2024 0007 by Alicja Hodge RN  Pain Management Interventions:   care clustered   diversional activity provided  Intervention: Provide Person-Centered Care  Recent Flowsheet Documentation  Taken 5/26/2024 2003 by Alicja Hodge RN  Trust Relationship/Rapport:   care explained   choices provided  Goal: Readiness for Transition of Care  Outcome: Ongoing, Progressing  Intervention: Mutually Develop Transition Plan  Recent Flowsheet Documentation  Taken 5/27/2024 0019 by Alicja Hodge, RN  Discharge Facility/Level of Care Needs:   nursing facility, skilled   home with home health  Equipment Needed After Discharge: none  Equipment Currently Used at Home:   walker, rolling   oxygen   pulse ox  Anticipated Changes Related to Illness: inability to care for self  Transportation Anticipated: family or friend will provide  Outpatient/Agency/Support Group Needs:   skilled nursing facility   homecare  agency  Transportation Concerns: no car  Current Discharge Risk: physical impairment  Concerns to be Addressed: discharge planning  Readmission Within the Last 30 Days: current reason for admission unrelated to previous admission  Patient/Family Anticipated Services at Transition: none  Patient/Family Anticipates Transition to: home with family     Problem: Asthma Comorbidity  Goal: Maintenance of Asthma Control  Outcome: Ongoing, Progressing  Intervention: Maintain Asthma Symptom Control  Recent Flowsheet Documentation  Taken 5/27/2024 0007 by Alicja Hodge RN  Medication Review/Management:   medications reviewed   high-risk medications identified  Taken 5/26/2024 2212 by Alicja Hodge RN  Medication Review/Management:   medications reviewed   high-risk medications identified  Taken 5/26/2024 2003 by Alicja Hodge RN  Medication Review/Management:   medications reviewed   high-risk medications identified     Problem: Behavioral Health Comorbidity  Goal: Maintenance of Behavioral Health Symptom Control  Outcome: Ongoing, Progressing  Intervention: Maintain Behavioral Health Symptom Control  Recent Flowsheet Documentation  Taken 5/27/2024 0007 by Alicja Hodge RN  Medication Review/Management:   medications reviewed   high-risk medications identified  Taken 5/26/2024 2212 by Alicja Hodge RN  Medication Review/Management:   medications reviewed   high-risk medications identified  Taken 5/26/2024 2003 by Alicja Hodge RN  Medication Review/Management:   medications reviewed   high-risk medications identified     Problem: COPD (Chronic Obstructive Pulmonary Disease) Comorbidity  Goal: Maintenance of COPD Symptom Control  Outcome: Ongoing, Progressing  Intervention: Maintain COPD-Symptom Control  Recent Flowsheet Documentation  Taken 5/27/2024 0007 by Alicja Hodge RN  Medication Review/Management:   medications reviewed   high-risk medications identified  Taken 5/26/2024 2212 by Alicja Hodge  RN  Medication Review/Management:   medications reviewed   high-risk medications identified  Taken 5/26/2024 2003 by Alicja Hodge RN  Supportive Measures: active listening utilized  Medication Review/Management:   medications reviewed   high-risk medications identified     Problem: Diabetes Comorbidity  Goal: Blood Glucose Level Within Targeted Range  Outcome: Ongoing, Progressing     Problem: Heart Failure Comorbidity  Goal: Maintenance of Heart Failure Symptom Control  Outcome: Ongoing, Progressing  Intervention: Maintain Heart Failure-Management  Recent Flowsheet Documentation  Taken 5/27/2024 0007 by Alicja Hodge RN  Medication Review/Management:   medications reviewed   high-risk medications identified  Taken 5/26/2024 2212 by Alicja Hodge RN  Medication Review/Management:   medications reviewed   high-risk medications identified  Taken 5/26/2024 2003 by Alicja Hodge RN  Medication Review/Management:   medications reviewed   high-risk medications identified     Problem: Hypertension Comorbidity  Goal: Blood Pressure in Desired Range  Outcome: Ongoing, Progressing  Intervention: Maintain Blood Pressure Management  Recent Flowsheet Documentation  Taken 5/27/2024 0007 by Alicja Hodge RN  Medication Review/Management:   medications reviewed   high-risk medications identified  Taken 5/26/2024 2212 by Alicja Hodge RN  Medication Review/Management:   medications reviewed   high-risk medications identified  Taken 5/26/2024 2003 by Alicja Hodge RN  Syncope Management: position changed slowly  Medication Review/Management:   medications reviewed   high-risk medications identified     Problem: Obstructive Sleep Apnea Risk or Actual Comorbidity Management  Goal: Unobstructed Breathing During Sleep  Outcome: Ongoing, Progressing     Problem: Osteoarthritis Comorbidity  Goal: Maintenance of Osteoarthritis Symptom Control  Outcome: Ongoing, Progressing  Intervention: Maintain Osteoarthritis Symptom  Control  Recent Flowsheet Documentation  Taken 5/27/2024 0007 by Alicja Hodge RN  Medication Review/Management:   medications reviewed   high-risk medications identified  Taken 5/26/2024 2212 by Alicja Hodge RN  Medication Review/Management:   medications reviewed   high-risk medications identified  Taken 5/26/2024 2003 by Alicja Hodge RN  Activity Management: activity encouraged  Assistive Device Utilized:   gait belt   walker  Medication Review/Management:   medications reviewed   high-risk medications identified     Problem: Pain Chronic (Persistent) (Comorbidity Management)  Goal: Acceptable Pain Control and Functional Ability  Outcome: Ongoing, Progressing  Intervention: Manage Persistent Pain  Recent Flowsheet Documentation  Taken 5/27/2024 0007 by Alicja Hodge RN  Medication Review/Management:   medications reviewed   high-risk medications identified  Taken 5/26/2024 2212 by Alicja Hodge RN  Medication Review/Management:   medications reviewed   high-risk medications identified  Taken 5/26/2024 2003 by Alicja Hodge RN  Bowel Elimination Promotion:   adequate fluid intake promoted   ambulation promoted  Sleep/Rest Enhancement: relaxation techniques promoted  Medication Review/Management:   medications reviewed   high-risk medications identified  Intervention: Develop Pain Management Plan  Recent Flowsheet Documentation  Taken 5/27/2024 0007 by Alicja Hodge RN  Pain Management Interventions:   care clustered   diversional activity provided  Intervention: Optimize Psychosocial Wellbeing  Recent Flowsheet Documentation  Taken 5/26/2024 2003 by Alicja Hodge RN  Supportive Measures: active listening utilized  Family/Support System Care:   support provided   self-care encouraged     Problem: Seizure Disorder Comorbidity  Goal: Maintenance of Seizure Control  Outcome: Ongoing, Progressing  Intervention: Maintain Seizure-Symptom Control  Recent Flowsheet Documentation  Taken 5/26/2024 2003 by  Hindle, Alicja, RN  Seizure Precautions: clutter-free environment maintained     Problem: Skin Injury Risk Increased  Goal: Skin Health and Integrity  Outcome: Ongoing, Progressing  Intervention: Optimize Skin Protection  Recent Flowsheet Documentation  Taken 5/26/2024 2003 by Alicja Hodge RN  Pressure Reduction Techniques: frequent weight shift encouraged  Head of Bed (HOB) Positioning: HOB at 20-30 degrees  Pressure Reduction Devices: specialty bed utilized     Problem: Fall Injury Risk  Goal: Absence of Fall and Fall-Related Injury  Outcome: Ongoing, Progressing  Intervention: Identify and Manage Contributors  Recent Flowsheet Documentation  Taken 5/27/2024 0007 by Alicja Hodge RN  Medication Review/Management:   medications reviewed   high-risk medications identified  Taken 5/26/2024 2212 by Alicja Hodge RN  Medication Review/Management:   medications reviewed   high-risk medications identified  Taken 5/26/2024 2003 by Alicja Hodge RN  Medication Review/Management:   medications reviewed   high-risk medications identified  Self-Care Promotion: independence encouraged  Intervention: Promote Injury-Free Environment  Recent Flowsheet Documentation  Taken 5/27/2024 0200 by Alicja Hodge RN  Safety Promotion/Fall Prevention:   assistive device/personal items within reach   clutter free environment maintained   nonskid shoes/slippers when out of bed   room organization consistent   safety round/check completed  Taken 5/27/2024 0007 by Alicja Hodge RN  Safety Promotion/Fall Prevention:   assistive device/personal items within reach   clutter free environment maintained   nonskid shoes/slippers when out of bed   room organization consistent   safety round/check completed  Taken 5/26/2024 2212 by Alicja Hodge RN  Safety Promotion/Fall Prevention:   assistive device/personal items within reach   clutter free environment maintained   nonskid shoes/slippers when out of bed   room organization  consistent   safety round/check completed  Taken 5/26/2024 2003 by Alicja Hodge RN  Safety Promotion/Fall Prevention:   assistive device/personal items within reach   clutter free environment maintained   nonskid shoes/slippers when out of bed   room organization consistent   safety round/check completed  Goal: Absence of Fall and Fall-Related Injury  Outcome: Ongoing, Progressing  Intervention: Identify and Manage Contributors  Recent Flowsheet Documentation  Taken 5/27/2024 0007 by Alicja Hodge RN  Medication Review/Management:   medications reviewed   high-risk medications identified  Taken 5/26/2024 2212 by Alicja Hodge RN  Medication Review/Management:   medications reviewed   high-risk medications identified  Taken 5/26/2024 2003 by Alicja Hodge RN  Medication Review/Management:   medications reviewed   high-risk medications identified  Self-Care Promotion: independence encouraged  Intervention: Promote Injury-Free Environment  Recent Flowsheet Documentation  Taken 5/27/2024 0200 by Alicja Hodge RN  Safety Promotion/Fall Prevention:   assistive device/personal items within reach   clutter free environment maintained   nonskid shoes/slippers when out of bed   room organization consistent   safety round/check completed  Taken 5/27/2024 0007 by Alicja Hodge RN  Safety Promotion/Fall Prevention:   assistive device/personal items within reach   clutter free environment maintained   nonskid shoes/slippers when out of bed   room organization consistent   safety round/check completed  Taken 5/26/2024 2212 by Alicja Hodge RN  Safety Promotion/Fall Prevention:   assistive device/personal items within reach   clutter free environment maintained   nonskid shoes/slippers when out of bed   room organization consistent   safety round/check completed  Taken 5/26/2024 2003 by Alicja Hodge RN  Safety Promotion/Fall Prevention:   assistive device/personal items within reach   clutter free environment  maintained   nonskid shoes/slippers when out of bed   room organization consistent   safety round/check completed   Goal Outcome Evaluation:  Plan of Care Reviewed With: patient        Progress: improving     Alert and oriented x 4. Able to verbalize needs and wants. Takes medication whole and tolerates well. Continent of bowel and bladder. Assist x 1 for transfers/ambulation. Continues to require O2 therapy at 4 LPM via NC and tolerating well. Continues to be on 1200 fluid restriction. Continues to be followed by cardiology, nephrology and pulmonology. PT/OT recommend SNF placement at discharge. Patient wanting to discharge home. Blood cultures pending, no growth at this time. O2 base line is 3 LPM. C/O pain, PRN Norco administered with positive effect noted. Currently in bed, eyes closed. Rise and fall of chest observed. Call bell in reach.

## 2024-05-27 NOTE — PROGRESS NOTES
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  Atrial fibrillation with RV  Volume overload  Hx fall  Hypothyroidism    Hyperlipidemia  History of kidney transplant  Anemia  CAD  History of DVT     Pulmonary hypertension  HTN  UTI        PLAN:     Oxygen supplement and titration to maintain saturation 90-95%: Currently requiring 4 L per nasal cannula  Encouraged use I-S flutter valve  Antibiotics: To complete 5/28/2024    Diuresis and electrolyte management as per nephrology  Electrolytes/ glycemic control  BP/HR control  Thyroid hormone replacement  Chronic anticoagulation: Apixaban     I personally reviewed the radiological images               LOS: 3 days     Subjective     Patient reports cough and shortness of breath    Objective     Vital signs for last 24 hours:  Vitals:    05/27/24 0424 05/27/24 0526 05/27/24 0808 05/27/24 1134   BP: 118/61  138/62 139/59   BP Location: Left arm  Left arm Left arm   Patient Position: Lying  Lying Lying   Pulse: 74   69   Resp: 22  24 25   Temp: 98.1 °F (36.7 °C)  96.6 °F (35.9 °C) 97.5 °F (36.4 °C)   TempSrc: Oral  Axillary Oral   SpO2: 100%   93%   Weight:  84.9 kg (187 lb 2.7 oz)     Height:           Intake/Output last 3 shifts:  I/O last 3 completed shifts:  In: 897 [P.O.:897]  Out: 525 [Urine:525]  Intake/Output this shift:  No intake/output data recorded.      Radiology  Imaging Results (Last 24 Hours)       ** No results found for the last 24 hours. **            Labs:  Results from last 7 days   Lab Units 05/27/24  0526   WBC 10*3/mm3 6.34   HEMOGLOBIN g/dL 10.2*   HEMATOCRIT % 36.0   PLATELETS 10*3/mm3 168     Results from last 7 days   Lab Units 05/27/24  0526 05/25/24  0443 05/24/24  1210   SODIUM mmol/L 140   < > 137   POTASSIUM mmol/L 4.3   < > 4.0   CHLORIDE mmol/L 97*   < > 94*   CO2 mmol/L 34.6*   < > 32.9*   BUN mg/dL 20   < > 21   CREATININE mg/dL 0.89    < > 1.16*   CALCIUM mg/dL 9.8   < > 10.1   BILIRUBIN mg/dL  --   --  1.2   ALK PHOS U/L  --   --  59   ALT (SGPT) U/L  --   --  9   AST (SGOT) U/L  --   --  16   GLUCOSE mg/dL 105*   < > 154*    < > = values in this interval not displayed.     Results from last 7 days   Lab Units 05/24/24  1225   PH, ARTERIAL pH units 7.442   PO2 ART mm Hg 56.7*   PCO2, ARTERIAL mm Hg 51.4*   HCO3 ART mmol/L 35.0*     Results from last 7 days   Lab Units 05/24/24  1210 05/22/24  0305 05/21/24  0145   ALBUMIN g/dL 3.6 3.8 3.9     Results from last 7 days   Lab Units 05/24/24  1408 05/24/24  1210   HSTROP T ng/L 35* 43*         Results from last 7 days   Lab Units 05/27/24  0526   MAGNESIUM mg/dL 1.9     Results from last 7 days   Lab Units 05/24/24  1210   INR  1.16*   APTT seconds 30.9*     Results from last 7 days   Lab Units 05/25/24  1110 05/24/24  1408   TSH uIU/mL 2.330  --    FREE T4 ng/dL  --  1.42           Meds:   SCHEDULE  apixaban, 5 mg, Oral, Q12H  cefTRIAXone, 2,000 mg, Intravenous, Q24H  cholestyramine light, 1 packet, Oral, Daily  dilTIAZem, 90 mg, Oral, Q8H  docusate sodium, 100 mg, Oral, BID  DULoxetine, 40 mg, Oral, Daily  famotidine, 20 mg, Oral, Daily  ferrous sulfate, 324 mg, Oral, Daily With Breakfast  furosemide, 80 mg, Oral, BID  guaiFENesin, 1,200 mg, Oral, BID  levothyroxine, 50 mcg, Oral, Q AM  metoclopramide, 5 mg, Oral, 4x Daily AC & at Bedtime  metoprolol succinate XL, 50 mg, Oral, Daily  polyethylene glycol, 17 g, Oral, Daily  potassium chloride, 40 mEq, Oral, Daily  predniSONE, 5 mg, Oral, Daily With Breakfast  sodium chloride, 10 mL, Intravenous, Q12H  tacrolimus, 1 mg, Oral, BID      Infusions     PRNs    acetaminophen    Calcium Replacement - Follow Nurse / BPA Driven Protocol    carboxymethylcellulose sod    diazePAM    Diclofenac Sodium    hydrALAZINE    HYDROcodone-acetaminophen    loperamide    Magnesium Standard Dose Replacement - Follow Nurse / BPA Driven Protocol    nitroglycerin     ondansetron ODT **OR** ondansetron    Phosphorus Replacement - Follow Nurse / BPA Driven Protocol    Potassium Replacement - Follow Nurse / BPA Driven Protocol    [COMPLETED] Insert Peripheral IV **AND** sodium chloride    sodium chloride    sodium chloride    Physical Exam:  General Appearance:  Alert   HEENT:  Normocephalic, without obvious abnormality, Conjunctiva/corneas clear,.   Nares normal, no drainage     Neck:  Supple, symmetrical, trachea midline.   Lungs /Chest wall:   Bilateral basal rhonchi, respirations unlabored, symmetrical wall movement.     Heart:  Regular rate and rhythm, S1 S2 normal  Abdomen: Soft, non-tender, no masses, no organomegaly.    Extremities: + Edema, no clubbing or cyanosis     ROS  Constitutional: Negative for chills, fever and malaise/fatigue.   HENT: Negative.    Eyes: Negative.    Cardiovascular: Negative.    Respiratory: Positive for cough and shortness of breath.    Skin: Negative.    Musculoskeletal: Negative.    Gastrointestinal: Negative.    Genitourinary: Negative.    Neurological: Generalized weakness      I reviewed the recent clinical results  I personally reviewed the latest radiological studies    Part of this note may be an electronic transcription/translation of spoken language to printed text using the Dragon Dictation System.

## 2024-05-27 NOTE — PROGRESS NOTES
LOS: 3 days   Patient Care Team:  Jonathan Luna APRN as PCP - General (Family Medicine)  Jak Gavin MD as Cardiologist (Cardiology)    Subjective     States she is mildly constipated with nausea     Review of Systems   Constitutional:  Positive for activity change, appetite change and fatigue.   HENT: Negative.     Respiratory: Negative.     Cardiovascular: Negative.    Gastrointestinal:  Positive for constipation.   Genitourinary: Negative.    Musculoskeletal: Negative.    Neurological:  Positive for weakness.   Psychiatric/Behavioral: Negative.             Objective     Vital Signs  Temp:  [96.6 °F (35.9 °C)-98.6 °F (37 °C)] 96.6 °F (35.9 °C)  Heart Rate:  [69-85] 74  Resp:  [17-26] 24  BP: (118-145)/(61-73) 138/62      Physical Exam  Vitals reviewed.   HENT:      Head: Normocephalic and atraumatic.      Left Ear: External ear normal.      Nose: Nose normal.      Mouth/Throat:      Mouth: Mucous membranes are moist.   Eyes:      General:         Right eye: No discharge.         Left eye: No discharge.   Cardiovascular:      Rate and Rhythm: Normal rate and regular rhythm.      Pulses: Normal pulses.      Heart sounds: Normal heart sounds.   Pulmonary:      Effort: Pulmonary effort is normal.      Breath sounds: Normal breath sounds.   Abdominal:      General: Bowel sounds are normal.      Palpations: Abdomen is soft.   Musculoskeletal:         General: Normal range of motion.      Cervical back: Normal range of motion.   Skin:     General: Skin is warm.   Neurological:      Mental Status: She is alert and oriented to person, place, and time.   Psychiatric:         Behavior: Behavior normal.              Results Review:    Lab Results (last 24 hours)       Procedure Component Value Units Date/Time    Magnesium [[id number]]  (Normal) Collected: 05/27/24 0526    Specimen: Blood from Hand, Left Updated: 05/27/24 0626     Magnesium 1.9 mg/dL     Basic Metabolic Panel [[id number]]  (Abnormal) Collected:  05/27/24 0526    Specimen: Blood from Hand, Left Updated: 05/27/24 0626     Glucose 105 mg/dL      BUN 20 mg/dL      Creatinine 0.89 mg/dL      Sodium 140 mmol/L      Potassium 4.3 mmol/L      Chloride 97 mmol/L      CO2 34.6 mmol/L      Calcium 9.8 mg/dL      BUN/Creatinine Ratio 22.5     Anion Gap 8.4 mmol/L      eGFR 66.9 mL/min/1.73     Narrative:      GFR Normal >60  Chronic Kidney Disease <60  Kidney Failure <15    The GFR formula is only valid for adults with stable renal function between ages 18 and 70.    CBC & Differential [[id number]]  (Abnormal) Collected: 05/27/24 0526    Specimen: Blood from Hand, Left Updated: 05/27/24 0610    Narrative:      The following orders were created for panel order CBC & Differential.  Procedure                               Abnormality         Status                     ---------                               -----------         ------                     CBC Auto Differential[[id number]]        Abnormal            Final result                 Please view results for these tests on the individual orders.    CBC Auto Differential [[id number]]  (Abnormal) Collected: 05/27/24 0526    Specimen: Blood from Hand, Left Updated: 05/27/24 0610     WBC 6.34 10*3/mm3      RBC 3.55 10*6/mm3      Hemoglobin 10.2 g/dL      Hematocrit 36.0 %      .4 fL      MCH 28.7 pg      MCHC 28.3 g/dL      RDW 16.9 %      RDW-SD 62.4 fl      MPV 9.3 fL      Platelets 168 10*3/mm3      Neutrophil % 74.6 %      Lymphocyte % 10.1 %      Monocyte % 11.8 %      Eosinophil % 2.1 %      Basophil % 0.8 %      Immature Grans % 0.6 %      Neutrophils, Absolute 4.73 10*3/mm3      Lymphocytes, Absolute 0.64 10*3/mm3      Monocytes, Absolute 0.75 10*3/mm3      Eosinophils, Absolute 0.13 10*3/mm3      Basophils, Absolute 0.05 10*3/mm3      Immature Grans, Absolute 0.04 10*3/mm3      nRBC 0.0 /100 WBC     Blood Culture - Blood, Arm, Left [[id number]]  (Normal) Collected: 05/24/24 1311    Specimen: Blood from Arm,  Left Updated: 05/26/24 1317     Blood Culture No growth at 2 days    Blood Culture - Blood, Arm, Left [[id number]]  (Normal) Collected: 05/24/24 1251    Specimen: Blood from Arm, Left Updated: 05/26/24 1300     Blood Culture No growth at 2 days             Imaging Results (Last 24 Hours)       ** No results found for the last 24 hours. **                 I reviewed the patient's new clinical results.    Medication Review:   Scheduled Meds:apixaban, 5 mg, Oral, Q12H  cefTRIAXone, 2,000 mg, Intravenous, Q24H  cholestyramine light, 1 packet, Oral, Daily  dilTIAZem, 90 mg, Oral, Q8H  DULoxetine, 40 mg, Oral, Daily  famotidine, 20 mg, Oral, Daily  ferrous sulfate, 324 mg, Oral, Daily With Breakfast  furosemide, 80 mg, Oral, BID  guaiFENesin, 1,200 mg, Oral, BID  levothyroxine, 50 mcg, Oral, Q AM  metoprolol succinate XL, 50 mg, Oral, Daily  potassium chloride, 40 mEq, Oral, Daily  predniSONE, 5 mg, Oral, Daily With Breakfast  sodium chloride, 10 mL, Intravenous, Q12H  tacrolimus, 1 mg, Oral, BID      Continuous Infusions:   PRN Meds:.  acetaminophen    Calcium Replacement - Follow Nurse / BPA Driven Protocol    carboxymethylcellulose sod    diazePAM    Diclofenac Sodium    hydrALAZINE    HYDROcodone-acetaminophen    loperamide    Magnesium Standard Dose Replacement - Follow Nurse / BPA Driven Protocol    nitroglycerin    ondansetron ODT **OR** ondansetron    Phosphorus Replacement - Follow Nurse / BPA Driven Protocol    Potassium Replacement - Follow Nurse / BPA Driven Protocol    [COMPLETED] Insert Peripheral IV **AND** sodium chloride    sodium chloride    sodium chloride     Interval History:    Assessment & Plan     Acute exacerbation of heart failure with preserved ejection fraction  Pulmonary hypertension/severe tricuspid valve regurgitation  Nonobstructive CAD  -IV diuresis   - nephrology team following    Nausea  -adding bowel regimen/pepcid/reglan     Hx of atrial fibrillation  - Eliquis with rate controlled      ESRD previous HD with fistula rt arm/transplant recipient   -Nephrology following  -prednisone and prograf    Janene Archer, APRN  05/27/24  10:49 EDT

## 2024-05-27 NOTE — NURSING NOTE
Daily Care Plan Summary: Heart Failure    Diuretic in use (IV or PO):   PO Lasix 80 mg BID        Daily weight (up or down):    gain: 0.3Kg      Output > Intake (yes/no):Yes      O2 Requirements (current, any change?):  4 liters/min via nasal cannula      Symptoms noted with Activity (Respiratory Tolerance, functional state):     assist x 1 for transfers      Anticipated Discharge Plans:     patient wants to go home. PT/OT recommends SNF

## 2024-05-27 NOTE — PLAN OF CARE
Goal Outcome Evaluation:Plan is for patient to return home when medically stable.

## 2024-05-28 LAB
ANION GAP SERPL CALCULATED.3IONS-SCNC: 6.1 MMOL/L (ref 5–15)
BASOPHILS # BLD AUTO: 0.04 10*3/MM3 (ref 0–0.2)
BASOPHILS NFR BLD AUTO: 0.7 % (ref 0–1.5)
BUN SERPL-MCNC: 19 MG/DL (ref 8–23)
BUN/CREAT SERPL: 22.6 (ref 7–25)
CALCIUM SPEC-SCNC: 9.9 MG/DL (ref 8.6–10.5)
CHLORIDE SERPL-SCNC: 99 MMOL/L (ref 98–107)
CO2 SERPL-SCNC: 35.9 MMOL/L (ref 22–29)
CREAT SERPL-MCNC: 0.84 MG/DL (ref 0.57–1)
DEPRECATED RDW RBC AUTO: 64.3 FL (ref 37–54)
EGFRCR SERPLBLD CKD-EPI 2021: 71.7 ML/MIN/1.73
EOSINOPHIL # BLD AUTO: 0.19 10*3/MM3 (ref 0–0.4)
EOSINOPHIL NFR BLD AUTO: 3.2 % (ref 0.3–6.2)
ERYTHROCYTE [DISTWIDTH] IN BLOOD BY AUTOMATED COUNT: 17.1 % (ref 12.3–15.4)
GLUCOSE SERPL-MCNC: 98 MG/DL (ref 65–99)
HCT VFR BLD AUTO: 33.7 % (ref 34–46.6)
HGB BLD-MCNC: 9.4 G/DL (ref 12–15.9)
IMM GRANULOCYTES # BLD AUTO: 0.03 10*3/MM3 (ref 0–0.05)
IMM GRANULOCYTES NFR BLD AUTO: 0.5 % (ref 0–0.5)
LYMPHOCYTES # BLD AUTO: 0.53 10*3/MM3 (ref 0.7–3.1)
LYMPHOCYTES NFR BLD AUTO: 8.8 % (ref 19.6–45.3)
MCH RBC QN AUTO: 28.4 PG (ref 26.6–33)
MCHC RBC AUTO-ENTMCNC: 27.9 G/DL (ref 31.5–35.7)
MCV RBC AUTO: 101.8 FL (ref 79–97)
MONOCYTES # BLD AUTO: 0.7 10*3/MM3 (ref 0.1–0.9)
MONOCYTES NFR BLD AUTO: 11.6 % (ref 5–12)
NEUTROPHILS NFR BLD AUTO: 4.52 10*3/MM3 (ref 1.7–7)
NEUTROPHILS NFR BLD AUTO: 75.2 % (ref 42.7–76)
NRBC BLD AUTO-RTO: 0 /100 WBC (ref 0–0.2)
PLATELET # BLD AUTO: 161 10*3/MM3 (ref 140–450)
PMV BLD AUTO: 9.7 FL (ref 6–12)
POTASSIUM SERPL-SCNC: 4.6 MMOL/L (ref 3.5–5.2)
RBC # BLD AUTO: 3.31 10*6/MM3 (ref 3.77–5.28)
SODIUM SERPL-SCNC: 141 MMOL/L (ref 136–145)
WBC NRBC COR # BLD AUTO: 6.01 10*3/MM3 (ref 3.4–10.8)

## 2024-05-28 PROCEDURE — 63710000001 PREDNISONE PER 5 MG: Performed by: INTERNAL MEDICINE

## 2024-05-28 PROCEDURE — 97530 THERAPEUTIC ACTIVITIES: CPT

## 2024-05-28 PROCEDURE — 94640 AIRWAY INHALATION TREATMENT: CPT

## 2024-05-28 PROCEDURE — 25010000002 CEFTRIAXONE PER 250 MG: Performed by: INTERNAL MEDICINE

## 2024-05-28 PROCEDURE — 97116 GAIT TRAINING THERAPY: CPT

## 2024-05-28 PROCEDURE — 85025 COMPLETE CBC W/AUTO DIFF WBC: CPT | Performed by: INTERNAL MEDICINE

## 2024-05-28 PROCEDURE — 80048 BASIC METABOLIC PNL TOTAL CA: CPT | Performed by: INTERNAL MEDICINE

## 2024-05-28 PROCEDURE — 99232 SBSQ HOSP IP/OBS MODERATE 35: CPT | Performed by: INTERNAL MEDICINE

## 2024-05-28 PROCEDURE — 63710000001 TACROLIMUS PER 1 MG: Performed by: INTERNAL MEDICINE

## 2024-05-28 PROCEDURE — 94664 DEMO&/EVAL PT USE INHALER: CPT

## 2024-05-28 PROCEDURE — 94799 UNLISTED PULMONARY SVC/PX: CPT

## 2024-05-28 PROCEDURE — 97110 THERAPEUTIC EXERCISES: CPT

## 2024-05-28 PROCEDURE — 80197 ASSAY OF TACROLIMUS: CPT | Performed by: INTERNAL MEDICINE

## 2024-05-28 RX ORDER — IPRATROPIUM BROMIDE AND ALBUTEROL SULFATE 2.5; .5 MG/3ML; MG/3ML
3 SOLUTION RESPIRATORY (INHALATION)
Status: DISCONTINUED | OUTPATIENT
Start: 2024-05-28 | End: 2024-06-02

## 2024-05-28 RX ADMIN — Medication 10 ML: at 08:43

## 2024-05-28 RX ADMIN — GUAIFENESIN 1200 MG: 600 TABLET, EXTENDED RELEASE ORAL at 08:21

## 2024-05-28 RX ADMIN — DULOXETINE HYDROCHLORIDE 40 MG: 20 CAPSULE, DELAYED RELEASE ORAL at 08:22

## 2024-05-28 RX ADMIN — FUROSEMIDE 80 MG: 40 TABLET ORAL at 08:22

## 2024-05-28 RX ADMIN — TACROLIMUS 1 MG: 1 CAPSULE ORAL at 21:52

## 2024-05-28 RX ADMIN — FERROUS SULFATE TAB EC 324 MG (65 MG FE EQUIVALENT) 324 MG: 324 (65 FE) TABLET DELAYED RESPONSE at 08:20

## 2024-05-28 RX ADMIN — FUROSEMIDE 80 MG: 40 TABLET ORAL at 18:02

## 2024-05-28 RX ADMIN — METOCLOPRAMIDE 5 MG: 5 TABLET ORAL at 20:35

## 2024-05-28 RX ADMIN — POLYETHYLENE GLYCOL 3350 17 G: 17 POWDER, FOR SOLUTION ORAL at 08:20

## 2024-05-28 RX ADMIN — PREDNISONE 5 MG: 5 TABLET ORAL at 08:20

## 2024-05-28 RX ADMIN — METOPROLOL SUCCINATE 50 MG: 50 TABLET, EXTENDED RELEASE ORAL at 08:22

## 2024-05-28 RX ADMIN — IPRATROPIUM BROMIDE AND ALBUTEROL SULFATE 3 ML: .5; 3 SOLUTION RESPIRATORY (INHALATION) at 15:01

## 2024-05-28 RX ADMIN — APIXABAN 5 MG: 5 TABLET, FILM COATED ORAL at 08:21

## 2024-05-28 RX ADMIN — METOCLOPRAMIDE 5 MG: 5 TABLET ORAL at 18:02

## 2024-05-28 RX ADMIN — DOCUSATE SODIUM 100 MG: 100 CAPSULE, LIQUID FILLED ORAL at 08:22

## 2024-05-28 RX ADMIN — APIXABAN 5 MG: 5 TABLET, FILM COATED ORAL at 20:35

## 2024-05-28 RX ADMIN — IPRATROPIUM BROMIDE AND ALBUTEROL SULFATE 3 ML: .5; 3 SOLUTION RESPIRATORY (INHALATION) at 11:44

## 2024-05-28 RX ADMIN — TACROLIMUS 1 MG: 1 CAPSULE ORAL at 08:43

## 2024-05-28 RX ADMIN — POTASSIUM CHLORIDE 40 MEQ: 1500 TABLET, EXTENDED RELEASE ORAL at 08:20

## 2024-05-28 RX ADMIN — Medication 10 ML: at 20:36

## 2024-05-28 RX ADMIN — CHOLESTYRAMINE 4 G: 4 POWDER, FOR SUSPENSION ORAL at 08:20

## 2024-05-28 RX ADMIN — DILTIAZEM HYDROCHLORIDE 90 MG: 60 TABLET, FILM COATED ORAL at 14:36

## 2024-05-28 RX ADMIN — DILTIAZEM HYDROCHLORIDE 90 MG: 60 TABLET, FILM COATED ORAL at 04:47

## 2024-05-28 RX ADMIN — HYDROCODONE BITARTRATE AND ACETAMINOPHEN 1 TABLET: 5; 325 TABLET ORAL at 04:47

## 2024-05-28 RX ADMIN — METOCLOPRAMIDE 5 MG: 5 TABLET ORAL at 11:59

## 2024-05-28 RX ADMIN — METOCLOPRAMIDE 5 MG: 5 TABLET ORAL at 08:22

## 2024-05-28 RX ADMIN — FAMOTIDINE 20 MG: 20 TABLET, FILM COATED ORAL at 08:22

## 2024-05-28 RX ADMIN — DIAZEPAM 5 MG: 5 TABLET ORAL at 11:59

## 2024-05-28 RX ADMIN — CEFTRIAXONE SODIUM 2000 MG: 2 INJECTION, POWDER, FOR SOLUTION INTRAMUSCULAR; INTRAVENOUS at 14:36

## 2024-05-28 RX ADMIN — LEVOTHYROXINE SODIUM 50 MCG: 0.05 TABLET ORAL at 04:47

## 2024-05-28 RX ADMIN — GUAIFENESIN 1200 MG: 600 TABLET, EXTENDED RELEASE ORAL at 20:35

## 2024-05-28 RX ADMIN — DIAZEPAM 5 MG: 5 TABLET ORAL at 21:52

## 2024-05-28 RX ADMIN — DILTIAZEM HYDROCHLORIDE 90 MG: 60 TABLET, FILM COATED ORAL at 21:53

## 2024-05-28 RX ADMIN — DOCUSATE SODIUM 100 MG: 100 CAPSULE, LIQUID FILLED ORAL at 20:35

## 2024-05-28 NOTE — PROGRESS NOTES
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  Atrial fibrillation with RV  Volume overload  Hx fall  Hypothyroidism    Hyperlipidemia  History of kidney transplant  Anemia  CAD  History of DVT     Pulmonary hypertension  HTN  UTI        PLAN:     Oxygen supplement and titration to maintain saturation 90-95%: Currently requiring 4 L per nasal cannula  Encouraged use I-S flutter valve  Antibiotics: completed 5/28/2024    Diuresis and electrolyte management as per nephrology  Electrolytes/ glycemic control  BP/HR control  Thyroid hormone replacement  Chronic anticoagulation: Apixaban     I personally reviewed the radiological images               LOS: 4 days     Subjective     Patient reports cough and shortness of breath    Objective     Vital signs for last 24 hours:  Vitals:    05/28/24 1147 05/28/24 1436 05/28/24 1501 05/28/24 1505   BP:  155/66     BP Location:       Patient Position:       Pulse: 74 90 87 85   Resp: 22  20 18   Temp:       TempSrc:       SpO2: 94%  95% 96%   Weight:       Height:           Intake/Output last 3 shifts:  I/O last 3 completed shifts:  In: 1014 [P.O.:1014]  Out: 1350 [Urine:1350]  Intake/Output this shift:  I/O this shift:  In: 240 [P.O.:240]  Out: 500 [Urine:500]      Radiology  Imaging Results (Last 24 Hours)       ** No results found for the last 24 hours. **            Labs:  Results from last 7 days   Lab Units 05/28/24  0450   WBC 10*3/mm3 6.01   HEMOGLOBIN g/dL 9.4*   HEMATOCRIT % 33.7*   PLATELETS 10*3/mm3 161     Results from last 7 days   Lab Units 05/28/24  0450 05/25/24  0443 05/24/24  1210   SODIUM mmol/L 141   < > 137   POTASSIUM mmol/L 4.6   < > 4.0   CHLORIDE mmol/L 99   < > 94*   CO2 mmol/L 35.9*   < > 32.9*   BUN mg/dL 19   < > 21   CREATININE mg/dL 0.84   < > 1.16*   CALCIUM mg/dL 9.9   < > 10.1   BILIRUBIN mg/dL  --   --  1.2   ALK PHOS U/L  --   --  59   ALT  (SGPT) U/L  --   --  9   AST (SGOT) U/L  --   --  16   GLUCOSE mg/dL 98   < > 154*    < > = values in this interval not displayed.     Results from last 7 days   Lab Units 05/24/24  1225   PH, ARTERIAL pH units 7.442   PO2 ART mm Hg 56.7*   PCO2, ARTERIAL mm Hg 51.4*   HCO3 ART mmol/L 35.0*     Results from last 7 days   Lab Units 05/24/24  1210 05/22/24  0305   ALBUMIN g/dL 3.6 3.8     Results from last 7 days   Lab Units 05/24/24  1408 05/24/24  1210   HSTROP T ng/L 35* 43*         Results from last 7 days   Lab Units 05/27/24  0526   MAGNESIUM mg/dL 1.9     Results from last 7 days   Lab Units 05/24/24  1210   INR  1.16*   APTT seconds 30.9*     Results from last 7 days   Lab Units 05/25/24  1110 05/24/24  1408   TSH uIU/mL 2.330  --    FREE T4 ng/dL  --  1.42           Meds:   SCHEDULE  apixaban, 5 mg, Oral, Q12H  cholestyramine light, 1 packet, Oral, Daily  dilTIAZem, 90 mg, Oral, Q8H  docusate sodium, 100 mg, Oral, BID  DULoxetine, 40 mg, Oral, Daily  ferrous sulfate, 324 mg, Oral, Daily With Breakfast  furosemide, 80 mg, Oral, BID  guaiFENesin, 1,200 mg, Oral, BID  ipratropium-albuterol, 3 mL, Nebulization, 4x Daily - RT  levothyroxine, 50 mcg, Oral, Q AM  metoclopramide, 5 mg, Oral, 4x Daily AC & at Bedtime  metoprolol succinate XL, 50 mg, Oral, Daily  polyethylene glycol, 17 g, Oral, Daily  potassium chloride, 40 mEq, Oral, Daily  predniSONE, 5 mg, Oral, Daily With Breakfast  sodium chloride, 10 mL, Intravenous, Q12H  tacrolimus, 1 mg, Oral, BID      Infusions     PRNs    acetaminophen    Calcium Replacement - Follow Nurse / BPA Driven Protocol    carboxymethylcellulose sod    diazePAM    Diclofenac Sodium    hydrALAZINE    HYDROcodone-acetaminophen    loperamide    Magnesium Standard Dose Replacement - Follow Nurse / BPA Driven Protocol    nitroglycerin    ondansetron ODT **OR** ondansetron    Phosphorus Replacement - Follow Nurse / BPA Driven Protocol    Potassium Replacement - Follow Nurse / BPA Driven  Protocol    [COMPLETED] Insert Peripheral IV **AND** sodium chloride    sodium chloride    sodium chloride    Physical Exam:  General Appearance:  Alert   HEENT:  Normocephalic, without obvious abnormality, Conjunctiva/corneas clear,.   Nares normal, no drainage     Neck:  Supple, symmetrical, trachea midline.   Lungs /Chest wall:   Bilateral basal rhonchi, respirations unlabored, symmetrical wall movement.     Heart:  Regular rate and rhythm, S1 S2 normal  Abdomen: Soft, non-tender, no masses, no organomegaly.    Extremities: + Edema, no clubbing or cyanosis     ROS  Constitutional: Negative for chills, fever and malaise/fatigue.   HENT: Negative.    Eyes: Negative.    Cardiovascular: Negative.    Respiratory: Positive for cough and shortness of breath.    Skin: Negative.    Musculoskeletal: Negative.    Gastrointestinal: Negative.    Genitourinary: Negative.    Neurological: Generalized weakness      I reviewed the recent clinical results  I personally reviewed the latest radiological studies    Part of this note may be an electronic transcription/translation of spoken language to printed text using the Dragon Dictation System.

## 2024-05-28 NOTE — PLAN OF CARE
Problem: Adult Inpatient Plan of Care  Goal: Plan of Care Review  Outcome: Ongoing, Progressing  Flowsheets (Taken 5/28/2024 0235)  Progress: improving  Plan of Care Reviewed With: patient  Goal: Patient-Specific Goal (Individualized)  Outcome: Ongoing, Progressing  Goal: Absence of Hospital-Acquired Illness or Injury  Outcome: Ongoing, Progressing  Intervention: Identify and Manage Fall Risk  Recent Flowsheet Documentation  Taken 5/28/2024 0201 by Alicja Hodge, RN  Safety Promotion/Fall Prevention:   assistive device/personal items within reach   clutter free environment maintained   nonskid shoes/slippers when out of bed   fall prevention program maintained   room organization consistent   safety round/check completed  Taken 5/28/2024 0037 by Alicja Hodge, RN  Safety Promotion/Fall Prevention:   clutter free environment maintained   assistive device/personal items within reach   fall prevention program maintained   nonskid shoes/slippers when out of bed   room organization consistent   safety round/check completed  Taken 5/27/2024 2202 by Ailcja Hodge, RN  Safety Promotion/Fall Prevention:   clutter free environment maintained   assistive device/personal items within reach   fall prevention program maintained   nonskid shoes/slippers when out of bed   room organization consistent   safety round/check completed  Taken 5/27/2024 2006 by Alicja Hodge, RN  Safety Promotion/Fall Prevention:   assistive device/personal items within reach   clutter free environment maintained   fall prevention program maintained   nonskid shoes/slippers when out of bed   room organization consistent   safety round/check completed  Intervention: Prevent Skin Injury  Recent Flowsheet Documentation  Taken 5/27/2024 2006 by Alicja Hodge, RN  Body Position:   position changed independently   supine  Intervention: Prevent and Manage VTE (Venous Thromboembolism) Risk  Recent Flowsheet Documentation  Taken 5/27/2024 2006 by Naveen  GHASSAN Helm  Activity Management:   ambulated in room   activity encouraged  VTE Prevention/Management: sequential compression devices off  Range of Motion: active ROM (range of motion) encouraged  Intervention: Prevent Infection  Recent Flowsheet Documentation  Taken 5/28/2024 0201 by Alicja Hodge RN  Infection Prevention:   environmental surveillance performed   hand hygiene promoted   personal protective equipment utilized   rest/sleep promoted   single patient room provided  Taken 5/28/2024 0037 by Alicja Hodge RN  Infection Prevention:   environmental surveillance performed   hand hygiene promoted   rest/sleep promoted   single patient room provided   personal protective equipment utilized  Taken 5/27/2024 2202 by Alicja Hodge RN  Infection Prevention:   environmental surveillance performed   hand hygiene promoted   personal protective equipment utilized   rest/sleep promoted   single patient room provided  Taken 5/27/2024 2006 by Alicja Hodge RN  Infection Prevention:   environmental surveillance performed   hand hygiene promoted   personal protective equipment utilized   single patient room provided  Goal: Optimal Comfort and Wellbeing  Outcome: Ongoing, Progressing  Intervention: Monitor Pain and Promote Comfort  Recent Flowsheet Documentation  Taken 5/27/2024 2006 by Alicja Hodge RN  Pain Management Interventions:   care clustered   diversional activity provided   see MAR  Intervention: Provide Person-Centered Care  Recent Flowsheet Documentation  Taken 5/27/2024 2006 by Alicja Hodge RN  Trust Relationship/Rapport:   care explained   choices provided  Goal: Readiness for Transition of Care  Outcome: Ongoing, Progressing  Intervention: Mutually Develop Transition Plan  Recent Flowsheet Documentation  Taken 5/28/2024 0137 by Alicja Hodge RN  Discharge Facility/Level of Care Needs:   nursing facility, skilled   home with home health  Equipment Needed After Discharge: none  Equipment  Currently Used at Home:   walker, rolling   oxygen   pulse ox  Anticipated Changes Related to Illness: inability to care for self  Transportation Anticipated: family or friend will provide  Outpatient/Agency/Support Group Needs:   skilled nursing facility   homecare agency  Transportation Concerns: no car  Current Discharge Risk: physical impairment  Concerns to be Addressed: discharge planning  Readmission Within the Last 30 Days: current reason for admission unrelated to previous admission  Patient/Family Anticipated Services at Transition: none  Patient/Family Anticipates Transition to: home with family     Problem: Asthma Comorbidity  Goal: Maintenance of Asthma Control  Outcome: Ongoing, Progressing  Intervention: Maintain Asthma Symptom Control  Recent Flowsheet Documentation  Taken 5/27/2024 2006 by Alicja Hodge RN  Medication Review/Management:   medications reviewed   high-risk medications identified     Problem: Behavioral Health Comorbidity  Goal: Maintenance of Behavioral Health Symptom Control  Outcome: Ongoing, Progressing  Intervention: Maintain Behavioral Health Symptom Control  Recent Flowsheet Documentation  Taken 5/27/2024 2006 by Alicja Hodge, RN  Medication Review/Management:   medications reviewed   high-risk medications identified     Problem: COPD (Chronic Obstructive Pulmonary Disease) Comorbidity  Goal: Maintenance of COPD Symptom Control  Outcome: Ongoing, Progressing  Intervention: Maintain COPD-Symptom Control  Recent Flowsheet Documentation  Taken 5/27/2024 2006 by Alicja Hodge, RN  Supportive Measures: active listening utilized  Medication Review/Management:   medications reviewed   high-risk medications identified     Problem: Diabetes Comorbidity  Goal: Blood Glucose Level Within Targeted Range  Outcome: Ongoing, Progressing     Problem: Heart Failure Comorbidity  Goal: Maintenance of Heart Failure Symptom Control  Outcome: Ongoing, Progressing  Intervention: Maintain Heart  Failure-Management  Recent Flowsheet Documentation  Taken 5/27/2024 2006 by Alicja Hodge RN  Medication Review/Management:   medications reviewed   high-risk medications identified     Problem: Hypertension Comorbidity  Goal: Blood Pressure in Desired Range  Outcome: Ongoing, Progressing  Intervention: Maintain Blood Pressure Management  Recent Flowsheet Documentation  Taken 5/27/2024 2006 by Alicja Hodge RN  Syncope Management: legs elevated  Medication Review/Management:   medications reviewed   high-risk medications identified     Problem: Obstructive Sleep Apnea Risk or Actual Comorbidity Management  Goal: Unobstructed Breathing During Sleep  Outcome: Ongoing, Progressing     Problem: Osteoarthritis Comorbidity  Goal: Maintenance of Osteoarthritis Symptom Control  Outcome: Ongoing, Progressing  Intervention: Maintain Osteoarthritis Symptom Control  Recent Flowsheet Documentation  Taken 5/27/2024 2006 by Alicja Hodge RN  Activity Management:   ambulated in room   activity encouraged  Assistive Device Utilized:   gait belt   walker  Medication Review/Management:   medications reviewed   high-risk medications identified     Problem: Pain Chronic (Persistent) (Comorbidity Management)  Goal: Acceptable Pain Control and Functional Ability  Outcome: Ongoing, Progressing  Intervention: Manage Persistent Pain  Recent Flowsheet Documentation  Taken 5/27/2024 2006 by Alicja Hodge RN  Bowel Elimination Promotion:   adequate fluid intake promoted   ambulation promoted  Sleep/Rest Enhancement:   awakenings minimized   room darkened  Medication Review/Management:   medications reviewed   high-risk medications identified  Intervention: Develop Pain Management Plan  Recent Flowsheet Documentation  Taken 5/27/2024 2006 by Alicja Hodge RN  Pain Management Interventions:   care clustered   diversional activity provided   see MAR  Intervention: Optimize Psychosocial Wellbeing  Recent Flowsheet Documentation  Taken  5/27/2024 2006 by Alicja Hodge RN  Supportive Measures: active listening utilized  Family/Support System Care:   support provided   self-care encouraged     Problem: Seizure Disorder Comorbidity  Goal: Maintenance of Seizure Control  Outcome: Ongoing, Progressing  Intervention: Maintain Seizure-Symptom Control  Recent Flowsheet Documentation  Taken 5/27/2024 2006 by Alicja Hodge RN  Seizure Precautions: activity supervised     Problem: Skin Injury Risk Increased  Goal: Skin Health and Integrity  Outcome: Ongoing, Progressing  Intervention: Optimize Skin Protection  Recent Flowsheet Documentation  Taken 5/27/2024 2006 by Alicja Hodge RN  Pressure Reduction Techniques: frequent weight shift encouraged  Head of Bed (HOB) Positioning: HOB at 30-45 degrees  Pressure Reduction Devices: specialty bed utilized     Problem: Fall Injury Risk  Goal: Absence of Fall and Fall-Related Injury  Outcome: Ongoing, Progressing  Intervention: Identify and Manage Contributors  Recent Flowsheet Documentation  Taken 5/27/2024 2006 by Alicja Hodge RN  Medication Review/Management:   medications reviewed   high-risk medications identified  Self-Care Promotion:   independence encouraged   BADL personal objects within reach  Intervention: Promote Injury-Free Environment  Recent Flowsheet Documentation  Taken 5/28/2024 0201 by Alicja Hodge RN  Safety Promotion/Fall Prevention:   assistive device/personal items within reach   clutter free environment maintained   nonskid shoes/slippers when out of bed   fall prevention program maintained   room organization consistent   safety round/check completed  Taken 5/28/2024 0037 by Alicja Hodge RN  Safety Promotion/Fall Prevention:   clutter free environment maintained   assistive device/personal items within reach   fall prevention program maintained   nonskid shoes/slippers when out of bed   room organization consistent   safety round/check completed  Taken 5/27/2024 2202 by Naveen  GHASSAN Helm  Safety Promotion/Fall Prevention:   clutter free environment maintained   assistive device/personal items within reach   fall prevention program maintained   nonskid shoes/slippers when out of bed   room organization consistent   safety round/check completed  Taken 5/27/2024 2006 by Alicja Hodge RN  Safety Promotion/Fall Prevention:   assistive device/personal items within reach   clutter free environment maintained   fall prevention program maintained   nonskid shoes/slippers when out of bed   room organization consistent   safety round/check completed  Goal: Absence of Fall and Fall-Related Injury  Outcome: Ongoing, Progressing  Intervention: Identify and Manage Contributors  Recent Flowsheet Documentation  Taken 5/27/2024 2006 by Alicja Hodge RN  Medication Review/Management:   medications reviewed   high-risk medications identified  Self-Care Promotion:   independence encouraged   BADL personal objects within reach  Intervention: Promote Injury-Free Environment  Recent Flowsheet Documentation  Taken 5/28/2024 0201 by Alicja Hodge, RN  Safety Promotion/Fall Prevention:   assistive device/personal items within reach   clutter free environment maintained   nonskid shoes/slippers when out of bed   fall prevention program maintained   room organization consistent   safety round/check completed  Taken 5/28/2024 0037 by Alicja Hodge, RN  Safety Promotion/Fall Prevention:   clutter free environment maintained   assistive device/personal items within reach   fall prevention program maintained   nonskid shoes/slippers when out of bed   room organization consistent   safety round/check completed  Taken 5/27/2024 2202 by Alicja Hodge, RN  Safety Promotion/Fall Prevention:   clutter free environment maintained   assistive device/personal items within reach   fall prevention program maintained   nonskid shoes/slippers when out of bed   room organization consistent   safety round/check completed  Taken  5/27/2024 2006 by Alicja Hodge RN  Safety Promotion/Fall Prevention:   assistive device/personal items within reach   clutter free environment maintained   fall prevention program maintained   nonskid shoes/slippers when out of bed   room organization consistent   safety round/check completed   Goal Outcome Evaluation:  Plan of Care Reviewed With: patient        Progress: improving     Alert and oriented x 4. Able to verbalize needs and wants. Takes medication whole and tolerates well. Continent of bowel and bladder. Assist x 1 for transfers. Continues to be on 1200 ml/day fluid restriction. Continues to receive IV Rocephin, no s/s of adverse/allergic reaction noted. C/O pain and anxiousness, PRN Norco and Valium administered with positive effect noted. Continues to require O2 therapy at 3 LPM during resting and 4 LPM during exertion. Continues to be followed by cardiology, nephrology and pulmonology. Per case management patient plans on going home at discharge. PT/OT recommend SNF at discharge. Patient requests assistance from staff for many ADLs despite being able to perform them herself or with minimal assistance. Currently in bed, awake. Call bell in reach.

## 2024-05-28 NOTE — DISCHARGE PLACEMENT REQUEST
"Gene Carcamo (79 y.o. Female)       Date of Birth   1945    Social Security Number       Address   [address] [place] [address] [address] [address]    Home Phone   [phone]    MRN   [id number]       Buddhist   None    Marital Status                               Admission Date   5/24/24    Admission Type   Emergency    Admitting Provider   Juanis Lowe MD    Attending Provider   Juanis Lowe MD    Department, Room/Bed   [facility] 3C MEDICAL INPATIENT, 356/1       Discharge Date       Discharge Disposition       Discharge Destination                                 Attending Provider: Juanis Lowe MD    Allergies: Zolpidem    Isolation: Contact   Infection: ESBL E coli (11/08/20)   Code Status: CPR    Ht: 167.6 cm (66\")   Wt: 84.2 kg (185 lb 10 oz)    Admission Cmt: None   Principal Problem: Acute exacerbation of CHF (congestive heart failure) [I50.9]                   Active Insurance as of 5/24/2024       Primary Coverage       Payor Plan Insurance Group Employer/Plan Group    [facility] MEDICARE REPLACEMENT [facility] [id number]       Payor Plan Address Payor Plan Phone Number Payor Plan Fax Number Effective Dates    [address] [address]   1/1/2024 - None Entered    [facility] [id number]         Subscriber Name Subscriber Birth Date Member ID       GENE CARCAMO 1945 [id number]                     Emergency Contacts        (Rel.) Home Phone Work Phone Mobile Phone    MEALEXRENNY DANIAL (Son) [phone] -- [phone]    goldy potter (Grandchild) [phone] -- [phone]    Damaris No (Sister) -- -- [phone]            "

## 2024-05-28 NOTE — PROGRESS NOTES
"RENAL/KCC:     LOS: 4 days    Patient Care Team:  Jonathan Luna APRN as PCP - General (Family Medicine)  Jak Gavin MD as Cardiologist (Cardiology)    Chief Complaint:  Fluid overload    Subjective     Interval History:   Chart reviewed  825 cc UOP charted  BP stable  On 4L NC  Weight decreasing gradually  Still c/o SOA      Objective     Vital Sign Min/Max for last 24 hours  Temp  Min: 97.4 °F (36.3 °C)  Max: 99 °F (37.2 °C)   BP  Min: 132/61  Max: 139/59   Pulse  Min: 67  Max: 80   Resp  Min: 23  Max: 28   SpO2  Min: 93 %  Max: 99 %   Flow (L/min)  Min: 4  Max: 4   Weight  Min: 84.2 kg (185 lb 10 oz)  Max: 84.2 kg (185 lb 10 oz)     Flowsheet Rows      Flowsheet Row First Filed Value   Admission Height 167.6 cm (66\") Documented at 05/24/2024 1143   Admission Weight 86.2 kg (190 lb) Documented at 05/24/2024 1143            No intake/output data recorded.  I/O last 3 completed shifts:  In: 1014 [P.O.:1014]  Out: 1350 [Urine:1350]    Physical Exam:  GEN: Awake, NAD  ENT: PERRL, EOMI, MMM  NECK: Supple, no JVD  CHEST: CTAB, no W/R/C  CV: RRR, no M/G/R, +edema  ABD: Soft, NT, +BS  SKIN: Warm and Dry  NEURO: CN's intact      WBC WBC   Date Value Ref Range Status   05/28/2024 6.01 3.40 - 10.80 10*3/mm3 Final   05/27/2024 6.34 3.40 - 10.80 10*3/mm3 Final   05/26/2024 5.25 3.40 - 10.80 10*3/mm3 Final      HGB Hemoglobin   Date Value Ref Range Status   05/28/2024 9.4 (L) 12.0 - 15.9 g/dL Final   05/27/2024 10.2 (L) 12.0 - 15.9 g/dL Final   05/26/2024 8.9 (L) 12.0 - 15.9 g/dL Final      HCT Hematocrit   Date Value Ref Range Status   05/28/2024 33.7 (L) 34.0 - 46.6 % Final   05/27/2024 36.0 34.0 - 46.6 % Final   05/26/2024 31.0 (L) 34.0 - 46.6 % Final      Platlets No results found for: \"LABPLAT\"   MCV MCV   Date Value Ref Range Status   05/28/2024 101.8 (H) 79.0 - 97.0 fL Final   05/27/2024 101.4 (H) 79.0 - 97.0 fL Final   05/26/2024 99.7 (H) 79.0 - 97.0 fL Final          Sodium Sodium   Date Value Ref Range Status " "  05/28/2024 141 136 - 145 mmol/L Final   05/27/2024 140 136 - 145 mmol/L Final   05/26/2024 142 136 - 145 mmol/L Final      Potassium Potassium   Date Value Ref Range Status   05/28/2024 4.6 3.5 - 5.2 mmol/L Final   05/27/2024 4.3 3.5 - 5.2 mmol/L Final   05/26/2024 4.2 3.5 - 5.2 mmol/L Final      Chloride Chloride   Date Value Ref Range Status   05/28/2024 99 98 - 107 mmol/L Final   05/27/2024 97 (L) 98 - 107 mmol/L Final   05/26/2024 98 98 - 107 mmol/L Final      CO2 CO2   Date Value Ref Range Status   05/28/2024 35.9 (H) 22.0 - 29.0 mmol/L Final   05/27/2024 34.6 (H) 22.0 - 29.0 mmol/L Final   05/26/2024 36.3 (H) 22.0 - 29.0 mmol/L Final      BUN BUN   Date Value Ref Range Status   05/28/2024 19 8 - 23 mg/dL Final   05/27/2024 20 8 - 23 mg/dL Final   05/26/2024 25 (H) 8 - 23 mg/dL Final      Creatinine Creatinine   Date Value Ref Range Status   05/28/2024 0.84 0.57 - 1.00 mg/dL Final   05/27/2024 0.89 0.57 - 1.00 mg/dL Final   05/26/2024 1.10 (H) 0.57 - 1.00 mg/dL Final      Calcium Calcium   Date Value Ref Range Status   05/28/2024 9.9 8.6 - 10.5 mg/dL Final   05/27/2024 9.8 8.6 - 10.5 mg/dL Final   05/26/2024 9.6 8.6 - 10.5 mg/dL Final      PO4 No results found for: \"CAPO4\"   Albumin No results found for: \"ALBUMIN\"   Magnesium Magnesium   Date Value Ref Range Status   05/27/2024 1.9 1.6 - 2.4 mg/dL Final   05/26/2024 1.8 1.6 - 2.4 mg/dL Final      Uric Acid No results found for: \"URICACID\"        Results Review:     I reviewed the patient's new clinical results.    apixaban, 5 mg, Oral, Q12H  cefTRIAXone, 2,000 mg, Intravenous, Q24H  cholestyramine light, 1 packet, Oral, Daily  dilTIAZem, 90 mg, Oral, Q8H  docusate sodium, 100 mg, Oral, BID  DULoxetine, 40 mg, Oral, Daily  famotidine, 20 mg, Oral, Daily  ferrous sulfate, 324 mg, Oral, Daily With Breakfast  furosemide, 80 mg, Oral, BID  guaiFENesin, 1,200 mg, Oral, BID  levothyroxine, 50 mcg, Oral, Q AM  metoclopramide, 5 mg, Oral, 4x Daily AC & at " Bedtime  metoprolol succinate XL, 50 mg, Oral, Daily  polyethylene glycol, 17 g, Oral, Daily  potassium chloride, 40 mEq, Oral, Daily  predniSONE, 5 mg, Oral, Daily With Breakfast  sodium chloride, 10 mL, Intravenous, Q12H  tacrolimus, 1 mg, Oral, BID           Medication Review: Reviewed    Assessment & Plan     TONYA    ESRD - s/p DDKT     Chronic immunoRx     Fluid overload/CHF exacerbation     Atrial fibrillation     History of COPD     History of lung cancer     Hypokalemia     Plan:  Cr stable at 0.8 = baseline  K WNL  Continue Lasix of 80 mg PO BID and KCl 40 meq daily  On fluid restriction  Will follow      Naun Falcon MD  [facility]  05/28/24  08:25 EDT

## 2024-05-28 NOTE — NURSING NOTE
Daily Care Plan Summary: Heart Failure    Diuretic in use (IV or PO):   PO Lasix 80 mg BID        Daily weight (up or down):    loss: 1.54 lbs      Output > Intake (yes/no):Yes      O2 Requirements (current, any change?):  4 liters/min via nasal cannula      Symptoms noted with Activity (Respiratory Tolerance, functional state):     Improvement      Anticipated Discharge Plans:     home

## 2024-05-28 NOTE — CASE MANAGEMENT/SOCIAL WORK
Social Work Assessment  [facility]     Patient Name: Jaja Carcamo  MRN: [id number]  Today's Date: 5/28/2024    Admit Date: 5/24/2024         Discharge Plan       Row Name 05/28/24 1224       Plan    Plan Comments LSW met with Pt at bedside to complete LACE. Pt's son was present at bedside. Pt lives alone and wants to return home. Son wants Pt to go to SNF and would like Pt to have more support at home. Pt is not interested at this time. Pt reported that she has some depression and anxiety and PCP manages Anxiety medications. Pt son helps with food, transportation and ADL's. LACE: 14                    Psychosocial       Row Name 05/28/24 1222       Values/Beliefs    Spiritual, Cultural Beliefs, Jainism Practices, Values that Affect Care no    Values/Beliefs Comment Sabianism       Behavior WDL    Behavior WDL WDL    Interactions appropriate to situation       Emotion Mood WDL    Emotion/Mood/Affect WDL WDL       Mental Health    Little Interest or Pleasure in Doing Things 0-->not at all    Feeling Down, Depressed or Hopeless 0-->not at all       Speech WDL    Speech WDL WDL       Thought Process WDL    Thought Process WDL WDL       Intellectual Performance WDL    Intellectual Performance WDL WDL       Stress    Do you feel stress - tense, restless, nervous, or anxious, or unable to sleep at night because your mind is troubled all the time - these days? To some exte       Coping/Stress    Major Change/Loss/Stressor loss of independence    Patient Personal Strengths assertive;expressive of emotions    Sources of Support adult child(katarzyna);other family members    Techniques to [place] with Loss/Stress/Change not applicable    Reaction to Health Status adjusting    Understanding of Condition and Treatment adequate understanding of medical condition;adequate understanding of treatment       Developmental Stage (Eriksson's)    Developmental Stage Stage 8 (65 years-death/Late Adulthood) Integrity vs. Despair       C-SSRS  (Recent)    Q1 Wished to be Dead (Past Month) no    Q2 Suicidal Thoughts (Past Month) no    Q6 Suicide Behavior (Lifetime) no       Violence Risk    Feels Like Hurting Others no    Previous Attempt to Harm Others no                   Abuse/Neglect       Row Name 05/28/24 1224       Personal Safety    Feels Unsafe at Home or Work/School no    Feels Threatened by Someone no    Does Anyone Try to Keep You From Having Contact with Others or Doing Things Outside Your Home? no    Physical Signs of Abuse Present no                   Legal       Row Name 05/28/24 1224       Financial Resource Strain    How hard is it for you to pay for the very basics like food, housing, medical care, and heating? Not very       Financial/Legal    Source of Income pension/half-way;social security       Legal    Criminal Activity/Legal Involvement none                   Substance Abuse       Row Name 05/28/24 1224       Substance Use    Substance Use Status never used       AUDIT-C (Alcohol Use Disorders ID Test)    Q1: How often do you have a drink containing alcohol? Never    Q2: How many drinks containing alcohol do you have on a typical day when you are drinking? None    Q3: How often do you have six or more drinks on one occasion? Never    Audit-C Score 0       Family Member Substance Use (#4)    Family History of Substance Use none    Previous Substance Use Treatment none             OSBALDO Chakraborty, MSW    Phone: [phone]  Fax: [phone]  Email: [email or web address]   Sawyer Rivera

## 2024-05-28 NOTE — CASE MANAGEMENT/SOCIAL WORK
Continued Stay Note  DAHIANA Gay     Patient Name: Jaja Carcamo  MRN: [id number]  Today's Date: 5/28/2024    Admit Date: 5/24/2024    Plan: Referrals to [facility] and [facility] pending. Will require precert. PASRR approved.   Discharge Plan       Row Name 05/28/24 1531       Plan    Plan Referrals to [facility] and [facility] pending. Will require precert. PASRR approved.    Patient/Family in Agreement with Plan yes    Plan Comments CM met with patient and son Aris at bedside to follow up on SNF choices. Reviewed list and used Medicare.gov to provide son with ratings. Their first choices are [facility] and [facility] if able to accept insurance- referrals sent in Epic basket and liaison Nataliia notified.             Case Management Readmission Assessment Note    Case Management Readmission Assessment (all recorded)       Readmission Interview       Row Name 05/28/24 1532             Readmission Indications    Is the patient and/or family able to complete the readmission assessment questions? Yes      Is this hospitalization related to the prior hospital diagnosis? Yes        Row Name 05/28/24 1532             Recommendation for rehospitalization    Did you speak with your physician prior to coming to the hospital No        Row Name 05/28/24 1532             Follow-up Appointments    Do you have a PCP? Yes  Jonathan CASTANEDA      Did you have an appointment with PCP after your hospitalization? No      Did you have an appointment with a Specialist? No      Are you current with the Pulmonary Clinic? No      Are you current with the CHF Clinic? No        Row Name 05/28/24 1532             Medications    Did you have newly prescribed medications at discharge? Yes  Cardizem, iron, lasix, and potassium chloride. Notes mention Eliquis but not written at d/c      Did you understand the reasons for your medications at discharge and how to take them? Yes      Did you understand the side effects of your medications?  Yes      Are you taking all of you prescribed medications? Yes      What pharmacy was used to fill prescription(s)? [facility]      Were medications picked up? Yes        Row Name 05/28/24 1532             Discharge Instructions    Did you understand your discharge instructions? Yes      Did your family/caregiver hear your instructions? Yes      Were you told to eat a special diet? Yes  Cardiac, healthy heart      Did you adhere to the diet? Yes      Were you given a number of someone to call if you had questions or concerns? Yes        Row Name 05/28/24 1532             Index discharge location/services    Where did you go upon discharge? Home with services      Do you have supportive family or friends in the home? Yes      What services were arranged at discharge? Home Health      Home Health Service used? [facility]      Have you had a Home Health visit since discharge? No        Row Name 05/28/24 1532             Discharge Readiness    On a scale of 1-5 (5 being well prepared), how ready were you for discharge 3      Recommendation based on interview Education on diagnosis/self management;Goals of care discussion/advanced care planning        Row Name 05/28/24 1532             Palliative Care/Hospice    Are you current with Palliative Care? No      Are you current with Hospice Care? No        Row Name 05/28/24 1532 05/24/24 1536          Advance Directives (For Healthcare)    Pre-existing AND/MOST/POLST Order No No     Advance Directive Status Patient has advance directive, copy in chart Patient does not have advance directive     Type of Advance Directive Health care directive/Living will --     Have you reviewed your Advance Directive and is it valid for this stay? Yes Not applicable     Literature Provided on Advance Directives No No     Patient Requests Assistance on Advance Directives Patient Declined Patient Declined                   Megan Naegele, RN     Office Phone: [phone]  Office  Cell: [phone]

## 2024-05-28 NOTE — THERAPY TREATMENT NOTE
"Subjective: Pt agreeable to therapeutic plan of care. Pt up in chair upon arrival.    Objective:     Bed mobility - N/A or Not attempted.    Transfers - SBA and with rolling walker. Cued for  hand placement and positioning. X3 attempts, with pt demonstrating increased time and difficulty coming to full stand.     Ambulation - 30 feet CGA and with rolling walker    Therapeutic Exercise - 20 Reps B LE AROM supported sitting / chair    Vitals: Desaturates while on 4L  Pt reports siginficant SOA following ambulation bout. Pt desats down to 82% on 4L, with increased time required to recover, with max cues on PLB.     Pain: 0 VAS   Location:   Intervention for pain: N/A    Education: Provided education on the importance of mobility in the acute care setting, Verbal/Tactile Cues, Transfer Training, and Gait Training    Assessment: Jaja Carcamo presents with functional mobility impairments which indicate the need for skilled intervention. Tolerating session today without incident. Pt presents with global weakness and decreased activity tolerance, becoming very SOA with exertion with slow recovery. Pt not safe to return home alone. Continuing to recommend SNF placement at discharge. Will continue to follow and progress as tolerated.     Plan/Recommendations:   If medically appropriate, Moderate Intensity Therapy recommended post-acute care. This is recommended as therapy feels the patient would require 3-4 days per week and wouldn't tolerate \"3 hour daily\" rehab intensity. SNF would be the preferred choice. If the patient does not agree to SNF, arrange HH or OP depending on home bound status. If patient is medically complex, consider LTACH. Pt requires no DME at discharge.     Pt desires Skilled Rehab placement at discharge. Pt cooperative; agreeable to therapeutic recommendations and plan of care.         Basic Mobility 6-click:  Rollin = Total, A lot = 2, A little = 3; 4 = None  Supine>Sit:   1 = Total, A lot " = 2, A little = 3; 4 = None   Sit>Stand with arms:  1 = Total, A lot = 2, A little = 3; 4 = None  Bed>Chair:   1 = Total, A lot = 2, A little = 3; 4 = None  Ambulate in room:  1 = Total, A lot = 2, A little = 3; 4 = None  3-5 Steps with railin = Total, A lot = 2, A little = 3; 4 = None  Score: 14    Modified [place]: N/A = No pre-op stroke/TIA    Post-Tx Position: Up in Chair, Alarms activated, and Call light and personal items within reach  PPE: gloves and gown

## 2024-05-28 NOTE — PROGRESS NOTES
Referring Provider: Juanis Lowe MD    Reason for follow-up: Shortness of breath     Patient Care Team:  Jonathan Luna APRN as PCP - General (Family Medicine)  Jak Gavin MD as Cardiologist (Cardiology)      SUBJECTIVE  She is sitting on bed and reports mild improvement in shortness of breath.     ROS  Review of all systems negative except as indicated.    Since I have last seen, the patient has been without any chest discomfort, shortness of breath, palpitations, dizziness or syncope.  Denies having any headache, abdominal pain, nausea, vomiting, diarrhea, constipation, loss of weight or loss of appetite.  Denies having any excessive bruising, hematuria or blood in the stool.  ROS      Personal History:    Past Medical History:   Diagnosis Date    Allergic rhinitis 04/14/2015    Asthma 08/10/2017    Atrial flutter 05/11/2017    Chronic diarrhea 04/15/2019    Chronic hypoxemic respiratory failure 01/18/2024    Chronic pain 02/03/2015    COPD (chronic obstructive pulmonary disease)     COVID-19 virus detected 08/11/2022    Cytokine release syndrome, grade 1 08/16/2022    Edema of both lower extremities due to peripheral venous insufficiency 03/12/2021    ESRD on hemodialysis 05/22/2013    Essential (primary) hypertension 03/03/2022    Fracture of ulnar styloid 05/19/2022    Fracture of unspecified carpal bone, right wrist, subsequent encounter for fracture with routine healing 03/03/2022    Gastroesophageal reflux disease 11/12/2020    Gout 08/10/2017    Hearing loss 08/10/2017    History of appendectomy     History of DVT (deep vein thrombosis) 11/12/2020    History of kidney transplant 06/30/2017    History of lobectomy of lung 01/18/2024 03/08/2016:  RIGHT Lower Lobe Mass--> Right Video-assisted thoracoscopy with a moderate-to-large wedge resection of the RLL (by Dr. Haris Mcconnell @ [facility])--> Poorly differentiated carcinoma of the RLL.      History of repair of hip joint 05/21/2013    History  of suicide attempt 05/20/2024    Hyperlipidemia 08/11/2014    Hypothyroidism, unspecified 03/03/2022    Infection due to extended spectrum beta lactamase (ESBL) producing bacteria 03/11/2016    No A2K system hx. +ESBL E coli urine on 3/11/16.      Irritable bowel syndrome 04/15/2019    Long term (current) use of immunosuppressive biologic 04/28/2021    Long term current use of anticoagulant therapy 01/18/2024    Malignant neoplasm of lung 08/10/2017    Menopausal flushing 08/30/2021    Mitral valve regurgitation 07/06/2015    Mixed anxiety and depressive disorder 04/30/2015    Non-small cell lung cancer 08/10/2017    Nonobstructive atherosclerosis of coronary artery 06/02/2019 08/12/2022: CATH: [facility]: NSTEMI assoc with Covid-19: LM:-nl;  LAD: diffuse, Ca++; 30%; CIRC: Dominant. Normal; RCA: small; 50% diffuse, proximal and mid-segment.      NSTEMI (non-ST elevated myocardial infarction) 08/11/2022    Obsessive-compulsive disorder 04/15/2019    Osteoarthritis 05/20/2024    Paroxysmal atrial fibrillation 05/11/2017    Paroxysmal supraventricular tachycardia 05/11/2017    Peripheral neuropathy 08/11/2014    Personal history of peptic ulcer disease 11/12/2020    Postoperative anemia due to acute blood loss 05/22/2013    Right wrist fracture 03/01/2022    Seasonal allergies 08/10/2017    Secondary hyperparathyroidism of renal origin 09/14/2015    Tricuspid valve regurgitation 03/15/2017    Ulcer of lower extremity 08/30/2021    Unspecified acute appendicitis 03/03/2022    Valvular heart disease 05/20/2024    Wegener's granulomatosis with renal involvement 03/03/2022       Past Surgical History:   Procedure Laterality Date    APPENDECTOMY      BREAST SURGERY Left     cysts rmeoved    CARDIAC CATHETERIZATION Right 08/12/2022    Procedure: Left Heart Cath and coronary angiogram;  Surgeon: Jak Gavin MD;  Location: [facility] INVASIVE LOCATION;  Service: Cardiology;  Laterality: Right;    CLOSED REDUCTION  WRIST FRACTURE Right 03/01/2022    Procedure: WRIST CLOSED REDUCTION;  Surgeon: Gaudencio Townsend MD;  Location: [facility] MAIN OR;  Service: Orthopedics;  Laterality: Right;    CYSTOSCOPY      HIP ARTHROPLASTY      HYSTERECTOMY      LUNG LOBECTOMY Right     TRANSPLANTATION RENAL         Family History   Problem Relation Age of Onset    No Known Problems Mother     No Known Problems Father        Social History     Tobacco Use    Smoking status: Former    Smokeless tobacco: Never   Vaping Use    Vaping status: Former   Substance Use Topics    Alcohol use: Never    Drug use: Never        Home meds:  Prior to Admission medications    Medication Sig Start Date End Date Taking? Authorizing Provider   acetaminophen (Tylenol) 325 MG tablet Take 2 tablets by mouth Every 4 (Four) Hours As Needed for Fever or Mild Pain. 3/13/20   Court Vences MD   albuterol sulfate  (90 Base) MCG/ACT inhaler Inhale 2 puffs Every 6 (Six) Hours As Needed for Wheezing.    Court Vences MD   apixaban (ELIQUIS) 5 MG tablet tablet Take 1 tablet by mouth Every 12 (Twelve) Hours. 8/16/22   Clyde White,    Carboxymethylcellulose Sodium (DRY EYE RELIEF OP) Apply 2 drops to eye(s) as directed by provider 2 (Two) Times a Day As Needed (dry eyes). 5/11/22   Court Vences MD   colestipol (COLESTID) 1 g tablet Take 1 tablet by mouth Daily.    Court Vences MD   diazePAM (VALIUM) 5 MG tablet Take 5 mg by mouth 2 (Two) Times a Day As Needed for Anxiety.    Court Vences MD   Diclofenac Sodium (Aspercreme Arthritis Pain) 1 % gel gel Apply 4 g topically to the appropriate area as directed 2 (Two) Times a Day As Needed (pain). 10/21/20   Court Vences MD   dilTIAZem (CARDIZEM) 90 MG tablet Take 1 tablet by mouth Every 8 (Eight) Hours. 5/22/24   Mariangel Stearns APRN   DULoxetine HCl 40 MG capsule delayed-release particles Take 1 capsule by mouth Daily.    Court Vences MD   ferrous sulfate  324 (65 Fe) MG tablet delayed-release EC tablet Take 1 tablet by mouth Daily With Breakfast. 5/23/24   Mariangel Stearns APRN   furosemide (LASIX) 80 MG tablet Take 1 tablet by mouth 2 (Two) Times a Day. 5/22/24   Mariangel Stearns APRN   guaiFENesin (Mucinex) 600 MG 12 hr tablet Take 1,200 mg by mouth 2 (Two) Times a Day. 8/16/22   Court Vences MD   levothyroxine (SYNTHROID, LEVOTHROID) 50 MCG tablet Take 1 tablet by mouth Daily.    Court Vences MD   loperamide (IMODIUM) 2 MG capsule Take 2 mg by mouth 4 (Four) Times a Day As Needed for Diarrhea. 3/13/20   Court Vences MD   metoprolol succinate XL (TOPROL-XL) 50 MG 24 hr tablet Take 50 mg by mouth Daily.    Court Vences MD   Ckxgy-Tvuyq-Upyuumk-Pramoxine (Neosporin + Pain Relief Max St) 1 % ointment Apply 1 application topically to the appropriate area as directed 2 (Two) Times a Day As Needed (sores). 7/2/20   Court Vences MD   potassium chloride (KLOR-CON M20) 20 MEQ CR tablet Take 2 tablets by mouth Daily. 5/22/24   Mariangel Stearns APRN   predniSONE (DELTASONE) 5 MG tablet Take 5 mg by mouth Daily.    Court Vences MD   Salicylic Acid-Urea (KERASAL EX) Apply 1 application topically to the appropriate area as directed 2 (Two) Times a Day As Needed (dry feet). 11/23/19   Court Vences MD   tacrolimus (PROGRAF) 1 MG capsule Take 1 mg by mouth 2 (Two) Times a Day.    Court Vences MD       Allergies:  Zolpidem    Scheduled Meds:apixaban, 5 mg, Oral, Q12H  cefTRIAXone, 2,000 mg, Intravenous, Q24H  cholestyramine light, 1 packet, Oral, Daily  dilTIAZem, 90 mg, Oral, Q8H  docusate sodium, 100 mg, Oral, BID  DULoxetine, 40 mg, Oral, Daily  famotidine, 20 mg, Oral, Daily  ferrous sulfate, 324 mg, Oral, Daily With Breakfast  furosemide, 80 mg, Oral, BID  guaiFENesin, 1,200 mg, Oral, BID  levothyroxine, 50 mcg, Oral, Q AM  metoclopramide, 5 mg, Oral, 4x Daily AC & at Bedtime  metoprolol  "succinate XL, 50 mg, Oral, Daily  polyethylene glycol, 17 g, Oral, Daily  potassium chloride, 40 mEq, Oral, Daily  predniSONE, 5 mg, Oral, Daily With Breakfast  sodium chloride, 10 mL, Intravenous, Q12H  tacrolimus, 1 mg, Oral, BID      Continuous Infusions:   PRN Meds:.  acetaminophen    Calcium Replacement - Follow Nurse / BPA Driven Protocol    carboxymethylcellulose sod    diazePAM    Diclofenac Sodium    hydrALAZINE    HYDROcodone-acetaminophen    loperamide    Magnesium Standard Dose Replacement - Follow Nurse / BPA Driven Protocol    nitroglycerin    ondansetron ODT **OR** ondansetron    Phosphorus Replacement - Follow Nurse / BPA Driven Protocol    Potassium Replacement - Follow Nurse / BPA Driven Protocol    [COMPLETED] Insert Peripheral IV **AND** sodium chloride    sodium chloride    sodium chloride      OBJECTIVE    Vital Signs  Vitals:    05/27/24 2000 05/28/24 0431 05/28/24 0444 05/28/24 0705   BP: 132/61 137/63  137/62   BP Location: Left arm Left arm  Left arm   Patient Position: Lying Lying  Lying   Pulse: 80 72  67   Resp: 24 27 23   Temp: 99 °F (37.2 °C) 98.1 °F (36.7 °C)  97.4 °F (36.3 °C)   TempSrc: Oral Oral  Oral   SpO2: 97% 99%  96%   Weight:   84.2 kg (185 lb 10 oz)    Height:           Flowsheet Rows      Flowsheet Row First Filed Value   Admission Height 167.6 cm (66\") Documented at 05/24/2024 1143   Admission Weight 86.2 kg (190 lb) Documented at 05/24/2024 1143              Intake/Output Summary (Last 24 hours) at 5/28/2024 0739  Last data filed at 5/28/2024 0300  Gross per 24 hour   Intake 357 ml   Output 825 ml   Net -468 ml          Telemetry: Atrial fibrillation with heart rate in the 60s and 70s    Physical Exam:  The patient is alert, oriented and in no distress.  Obese  Vital signs as noted above.  Head and neck revealed no carotid bruits or jugular venous distention.  No thyromegaly or lymphadenopathy is present  Lungs clear.  No wheezing.  Breath sounds are normal " bilaterally.  Heart normal first and second heart sounds.  No murmur. No precordial rub is present.  No gallop is present.  Abdomen soft and nontender.  No organomegaly is present.  Extremities with good peripheral pulses without any pedal edema.  Skin warm and dry.  Musculoskeletal system is grossly normal.  CNS grossly normal.       Results Review:  I have personally reviewed the results from the time of this admission to 5/28/2024 07:39 EDT and agree with these findings:  []  Laboratory  []  Microbiology  []  Radiology  []  EKG/Telemetry   []  Cardiology/Vascular   []  Pathology  []  Old records  []  Other:    Most notable findings include:    Lab Results (last 24 hours)       Procedure Component Value Units Date/Time    Basic Metabolic Panel [[id number]]  (Abnormal) Collected: 05/28/24 0450    Specimen: Blood from Arm, Left Updated: 05/28/24 0624     Glucose 98 mg/dL      BUN 19 mg/dL      Creatinine 0.84 mg/dL      Sodium 141 mmol/L      Potassium 4.6 mmol/L      Chloride 99 mmol/L      CO2 35.9 mmol/L      Calcium 9.9 mg/dL      BUN/Creatinine Ratio 22.6     Anion Gap 6.1 mmol/L      eGFR 71.7 mL/min/1.73     Narrative:      GFR Normal >60  Chronic Kidney Disease <60  Kidney Failure <15    The GFR formula is only valid for adults with stable renal function between ages 18 and 70.    CBC & Differential [[id number]]  (Abnormal) Collected: 05/28/24 0450    Specimen: Blood from Arm, Left Updated: 05/28/24 0605    Narrative:      The following orders were created for panel order CBC & Differential.  Procedure                               Abnormality         Status                     ---------                               -----------         ------                     CBC Auto Differential[[id number]]        Abnormal            Final result                 Please view results for these tests on the individual orders.    CBC Auto Differential [[id number]]  (Abnormal) Collected: 05/28/24 0450    Specimen: Blood from  Arm, Left Updated: 05/28/24 0605     WBC 6.01 10*3/mm3      RBC 3.31 10*6/mm3      Hemoglobin 9.4 g/dL      Hematocrit 33.7 %      .8 fL      MCH 28.4 pg      MCHC 27.9 g/dL      RDW 17.1 %      RDW-SD 64.3 fl      MPV 9.7 fL      Platelets 161 10*3/mm3      Neutrophil % 75.2 %      Lymphocyte % 8.8 %      Monocyte % 11.6 %      Eosinophil % 3.2 %      Basophil % 0.7 %      Immature Grans % 0.5 %      Neutrophils, Absolute 4.52 10*3/mm3      Lymphocytes, Absolute 0.53 10*3/mm3      Monocytes, Absolute 0.70 10*3/mm3      Eosinophils, Absolute 0.19 10*3/mm3      Basophils, Absolute 0.04 10*3/mm3      Immature Grans, Absolute 0.03 10*3/mm3      nRBC 0.0 /100 WBC     Blood Culture - Blood, Arm, Left [[id number]]  (Normal) Collected: 05/24/24 1311    Specimen: Blood from Arm, Left Updated: 05/27/24 1315     Blood Culture No growth at 3 days    Blood Culture - Blood, Arm, Left [[id number]]  (Normal) Collected: 05/24/24 1251    Specimen: Blood from Arm, Left Updated: 05/27/24 1302     Blood Culture No growth at 3 days            Imaging Results (Last 24 Hours)       ** No results found for the last 24 hours. **            LAB RESULTS (LAST 7 DAYS)    CBC  Results from last 7 days   Lab Units 05/28/24  0450 05/27/24  0526 05/26/24  0404 05/25/24  0443 05/24/24  1210 05/22/24  0305   WBC 10*3/mm3 6.01 6.34 5.25 5.75 7.75 6.64   RBC 10*6/mm3 3.31* 3.55* 3.11* 3.39* 3.29* 3.47*   HEMOGLOBIN g/dL 9.4* 10.2* 8.9* 9.6* 9.6* 10.0*   HEMATOCRIT % 33.7* 36.0 31.0* 33.8* 32.7* 34.2   MCV fL 101.8* 101.4* 99.7* 99.7* 99.4* 98.6*   PLATELETS 10*3/mm3 161 168 143 143 135* 131*       BMP  Results from last 7 days   Lab Units 05/28/24  0450 05/27/24  0526 05/26/24  0404 05/25/24  0443 05/24/24  1408 05/24/24  1210 05/22/24  0305 05/21/24  1603   SODIUM mmol/L 141 140 142 141  --  137 142  --    POTASSIUM mmol/L 4.6 4.3 4.2 4.0  --  4.0 3.6 4.4   CHLORIDE mmol/L 99 97* 98 97*  --  94* 99  --    CO2 mmol/L 35.9* 34.6* 36.3* 36.0*  --   32.9* 35.0*  --    BUN mg/dL 19 20 25* 24*  --  21 16  --    CREATININE mg/dL 0.84 0.89 1.10* 1.16*  --  1.16* 0.76  --    GLUCOSE mg/dL 98 105* 91 104*  --  154* 101*  --    MAGNESIUM mg/dL  --  1.9 1.8 2.1 1.8  --  1.5*  --    PHOSPHORUS mg/dL  --   --   --  4.3  --   --   --   --        CMP   Results from last 7 days   Lab Units 05/28/24  0450 05/27/24  0526 05/26/24  0404 05/25/24  0443 05/24/24  1210 05/22/24  0305 05/21/24  1603   SODIUM mmol/L 141 140 142 141 137 142  --    POTASSIUM mmol/L 4.6 4.3 4.2 4.0 4.0 3.6 4.4   CHLORIDE mmol/L 99 97* 98 97* 94* 99  --    CO2 mmol/L 35.9* 34.6* 36.3* 36.0* 32.9* 35.0*  --    BUN mg/dL 19 20 25* 24* 21 16  --    CREATININE mg/dL 0.84 0.89 1.10* 1.16* 1.16* 0.76  --    GLUCOSE mg/dL 98 105* 91 104* 154* 101*  --    ALBUMIN g/dL  --   --   --   --  3.6 3.8  --    BILIRUBIN mg/dL  --   --   --   --  1.2 0.9  --    ALK PHOS U/L  --   --   --   --  59 58  --    AST (SGOT) U/L  --   --   --   --  16 13  --    ALT (SGPT) U/L  --   --   --   --  9 8  --        BNP        TROPONIN  Results from last 7 days   Lab Units 05/24/24  1408   HSTROP T ng/L 35*       CoAg  Results from last 7 days   Lab Units 05/24/24  1210   INR  1.16*   APTT seconds 30.9*       Creatinine Clearance  Estimated Creatinine Clearance: 61.4 mL/min (by C-G formula based on SCr of 0.84 mg/dL).    ABG  Results from last 7 days   Lab Units 05/24/24  1225   PH, ARTERIAL pH units 7.442   PCO2, ARTERIAL mm Hg 51.4*   PO2 ART mm Hg 56.7*   O2 SATURATION ART % 89.5*   BASE EXCESS ART mmol/L 9.6*       Radiology  No radiology results for the last day      EKG  I personally viewed and interpreted the patient's EKG/Telemetry data:  ECG 12 Lead Dyspnea   Final Result   HEART RATE= 82  bpm   RR Interval= 732  ms   OK Interval=   ms   P Horizontal Axis=   deg   P Front Axis=   deg   QRSD Interval= 88  ms   QT Interval= 407  ms   QTcB= 476  ms   QRS Axis= -32  deg   T Wave Axis= 49  deg   - ABNORMAL ECG -   Atrial  fibrillation   Left axis deviation   When compared with ECG of 20-May-2024 9:37:54,   Significant rate increase   Significant repolarization change   Electronically Signed By: Praneeth Theodore (Derek) 25-May-2024 07:53:06   Date and Time of Study: 2024-05-24 11:36:06      Telemetry Scan   Final Result      Telemetry Scan   Final Result      Telemetry Scan   Final Result      Telemetry Scan   Final Result      Telemetry Scan   Final Result            Echocardiogram:    Results for orders placed during the hospital encounter of 05/17/24    Adult Transthoracic Echo Complete W/ Cont if Necessary Per Protocol    Interpretation Summary    Left ventricular systolic function is normal. Calculated left ventricular EF = 55% Left ventricular ejection fraction appears to be 51 - 55%.    Left ventricular diastolic function was indeterminate.    Left atrial volume is moderately increased.    The right atrial cavity is dilated.    Severe tricuspid valve regurgitation is present.    Estimated right ventricular systolic pressure from tricuspid regurgitation is markedly elevated (>55 mmHg).    Mild to moderate mitral valve regurgitation is present        Stress Test:         Cardiac Catheterization:  Results for orders placed during the hospital encounter of 08/11/22    Cardiac Catheterization/Vascular Study    Conclusion  IMPRESSIONS  Non obstructive CAD         Other:         ASSESSMENT & PLAN:    Principal Problem:    Acute exacerbation of CHF (congestive heart failure)      Atrial fibrillation  [id number] score is 5  Continue Eliquis for stroke prevention  On Toprol-XL and Cardizem for rate control  Unable to take Cardizem CD due to interaction with immunosuppressants.  TSH is normal     HFpEF/Pulmonary hypertension  Presented with proBNP of 4000 (compared to 8000 during previous admission)  She has chronic HFpEF and this was not an exacerbation.  Patient is euvolemic and now on oral diuretics  Monitor I's and O's and replace  electrolytes as needed  Continue Toprol-XL  Start Jardiance  Echocardiogram shows preserved LV function with severe tricuspid valve regurgitation and severe pulmonary hypertension.     UTI  On antibiotics     History of kidney transplant  On prednisone and Prograf  Creatinine 0.84, GFR is 71.7  Continue Lasix  Bicarb is now 36     COPD  History of lung cancer  CT shows minimal interval enlargement of the tissue surrounding the postsurgical changes associated with small bilateral pleural effusions.  Continue bronchodilators.  She should follow-up with an oncologist outpatient.     Obesity  Lifestyle modifications recommended to the patient.  BMI is 29.9, she weighs 185 pounds    Jak Gavin MD  05/28/24  07:39 EDT

## 2024-05-28 NOTE — PLAN OF CARE
Goal Outcome Evaluation:      Patient rested throughout shift, and up to the chair. Pt complains of soa. Remains on 3-4L NC. Pt remains in afib with occasional PVCs. Diuretics continued. No complaints of pain, will continue to observe.     Progress: improving

## 2024-05-28 NOTE — PROGRESS NOTES
LOS: 4 days   Patient Care Team:  Jonathan Luna APRN as PCP - General (Family Medicine)  Jak Gavin MD as Cardiologist (Cardiology)    Subjective     Patient c/o of significant exertional shortness of breath and difficulty recovering    Review of Systems   Constitutional:  Positive for activity change, appetite change and fatigue.   HENT: Negative.     Respiratory: Negative.     Cardiovascular: Negative.    Gastrointestinal:  Positive for constipation.   Genitourinary: Negative.    Musculoskeletal: Negative.    Neurological:  Positive for weakness.   Psychiatric/Behavioral: Negative.             Objective     Vital Signs  Temp:  [97.4 °F (36.3 °C)-99 °F (37.2 °C)] 97.4 °F (36.3 °C)  Heart Rate:  [67-80] 71  Resp:  [23-28] 23  BP: (132-161)/(59-66) 161/66      Physical Exam  Vitals reviewed.   HENT:      Head: Normocephalic and atraumatic.      Left Ear: External ear normal.      Nose: Nose normal.      Mouth/Throat:      Mouth: Mucous membranes are moist.   Eyes:      General:         Right eye: No discharge.         Left eye: No discharge.   Cardiovascular:      Rate and Rhythm: Normal rate and regular rhythm.      Pulses: Normal pulses.      Heart sounds: Normal heart sounds.   Pulmonary:      Effort: Pulmonary effort is normal.      Breath sounds: Normal breath sounds.   Abdominal:      General: Bowel sounds are normal.      Palpations: Abdomen is soft.   Musculoskeletal:         General: Normal range of motion.      Cervical back: Normal range of motion.   Skin:     General: Skin is warm.   Neurological:      Mental Status: She is alert and oriented to person, place, and time.   Psychiatric:         Behavior: Behavior normal.              Results Review:    Lab Results (last 24 hours)       Procedure Component Value Units Date/Time    Tacrolimus Level [[id number]] Collected: 05/28/24 0840    Specimen: Blood Updated: 05/28/24 0853    Basic Metabolic Panel [[id number]]  (Abnormal) Collected: 05/28/24 8520     Specimen: Blood from Arm, Left Updated: 05/28/24 0624     Glucose 98 mg/dL      BUN 19 mg/dL      Creatinine 0.84 mg/dL      Sodium 141 mmol/L      Potassium 4.6 mmol/L      Chloride 99 mmol/L      CO2 35.9 mmol/L      Calcium 9.9 mg/dL      BUN/Creatinine Ratio 22.6     Anion Gap 6.1 mmol/L      eGFR 71.7 mL/min/1.73     Narrative:      GFR Normal >60  Chronic Kidney Disease <60  Kidney Failure <15    The GFR formula is only valid for adults with stable renal function between ages 18 and 70.    CBC & Differential [[id number]]  (Abnormal) Collected: 05/28/24 0450    Specimen: Blood from Arm, Left Updated: 05/28/24 0605    Narrative:      The following orders were created for panel order CBC & Differential.  Procedure                               Abnormality         Status                     ---------                               -----------         ------                     CBC Auto Differential[[id number]]        Abnormal            Final result                 Please view results for these tests on the individual orders.    CBC Auto Differential [[id number]]  (Abnormal) Collected: 05/28/24 0450    Specimen: Blood from Arm, Left Updated: 05/28/24 0605     WBC 6.01 10*3/mm3      RBC 3.31 10*6/mm3      Hemoglobin 9.4 g/dL      Hematocrit 33.7 %      .8 fL      MCH 28.4 pg      MCHC 27.9 g/dL      RDW 17.1 %      RDW-SD 64.3 fl      MPV 9.7 fL      Platelets 161 10*3/mm3      Neutrophil % 75.2 %      Lymphocyte % 8.8 %      Monocyte % 11.6 %      Eosinophil % 3.2 %      Basophil % 0.7 %      Immature Grans % 0.5 %      Neutrophils, Absolute 4.52 10*3/mm3      Lymphocytes, Absolute 0.53 10*3/mm3      Monocytes, Absolute 0.70 10*3/mm3      Eosinophils, Absolute 0.19 10*3/mm3      Basophils, Absolute 0.04 10*3/mm3      Immature Grans, Absolute 0.03 10*3/mm3      nRBC 0.0 /100 WBC     Blood Culture - Blood, Arm, Left [[id number]]  (Normal) Collected: 05/24/24 1311    Specimen: Blood from Arm, Left Updated:  05/27/24 1315     Blood Culture No growth at 3 days    Blood Culture - Blood, Arm, Left [[id number]]  (Normal) Collected: 05/24/24 1251    Specimen: Blood from Arm, Left Updated: 05/27/24 1302     Blood Culture No growth at 3 days             Imaging Results (Last 24 Hours)       ** No results found for the last 24 hours. **                 I reviewed the patient's new clinical results.    Medication Review:   Scheduled Meds:apixaban, 5 mg, Oral, Q12H  cefTRIAXone, 2,000 mg, Intravenous, Q24H  cholestyramine light, 1 packet, Oral, Daily  dilTIAZem, 90 mg, Oral, Q8H  docusate sodium, 100 mg, Oral, BID  DULoxetine, 40 mg, Oral, Daily  famotidine, 20 mg, Oral, Daily  ferrous sulfate, 324 mg, Oral, Daily With Breakfast  furosemide, 80 mg, Oral, BID  guaiFENesin, 1,200 mg, Oral, BID  ipratropium-albuterol, 3 mL, Nebulization, 4x Daily - RT  levothyroxine, 50 mcg, Oral, Q AM  metoclopramide, 5 mg, Oral, 4x Daily AC & at Bedtime  metoprolol succinate XL, 50 mg, Oral, Daily  polyethylene glycol, 17 g, Oral, Daily  potassium chloride, 40 mEq, Oral, Daily  predniSONE, 5 mg, Oral, Daily With Breakfast  sodium chloride, 10 mL, Intravenous, Q12H  tacrolimus, 1 mg, Oral, BID      Continuous Infusions:   PRN Meds:.  acetaminophen    Calcium Replacement - Follow Nurse / BPA Driven Protocol    carboxymethylcellulose sod    diazePAM    Diclofenac Sodium    hydrALAZINE    HYDROcodone-acetaminophen    loperamide    Magnesium Standard Dose Replacement - Follow Nurse / BPA Driven Protocol    nitroglycerin    ondansetron ODT **OR** ondansetron    Phosphorus Replacement - Follow Nurse / BPA Driven Protocol    Potassium Replacement - Follow Nurse / BPA Driven Protocol    [COMPLETED] Insert Peripheral IV **AND** sodium chloride    sodium chloride    sodium chloride     Interval History:    Assessment & Plan     Acute exacerbation of heart failure with preserved ejection fraction  Pulmonary hypertension/severe tricuspid valve  regurgitation  Nonobstructive CAD  -IV diuresis   - nephrology team following    Nausea  -adding bowel regimen/pepcid/reglan     Hx of atrial fibrillation  - Eliquis with rate controlled  -Unable to take Cardizem CD due to interaction with immunosuppressants      ESRD previous HD with fistula rt arm/transplant recipient   -Nephrology following  -prednisone and prograf    Janene Archer, APRN  05/28/24  10:11 EDT

## 2024-05-28 NOTE — PLAN OF CARE
"Assessment: Jaja Carcamo presents with functional mobility impairments which indicate the need for skilled intervention. Tolerating session today without incident. Pt presents with global weakness and decreased activity tolerance, becoming very SOA with exertion with slow recovery. Pt not safe to return home alone. Continuing to recommend SNF placement at discharge. Will continue to follow and progress as tolerated.     Plan/Recommendations:   If medically appropriate, Moderate Intensity Therapy recommended post-acute care. This is recommended as therapy feels the patient would require 3-4 days per week and wouldn't tolerate \"3 hour daily\" rehab intensity. SNF would be the preferred choice. If the patient does not agree to SNF, arrange HH or OP depending on home bound status. If patient is medically complex, consider LTACH. Pt requires no DME at discharge.     Pt desires Skilled Rehab placement at discharge. Pt cooperative; agreeable to therapeutic recommendations and plan of care.     "

## 2024-05-28 NOTE — CASE MANAGEMENT/SOCIAL WORK
Social Work Assessment   Victor Hugo     Patient Name: Jaja Carcamo  MRN: [id number]  Today's Date: 5/28/2024    Admit Date: 5/24/2024         Discharge Plan       Row Name 05/28/24 1619       Plan    Plan Comments LSW left [employer] brochure with Pt at bedside as requested by son during first visit. No further needs identified.            OSBALDO Chakraborty, MSW    Phone: [phone]  Fax: [phone]  Email: [email or web address]

## 2024-05-28 NOTE — CASE MANAGEMENT/SOCIAL WORK
Continued Stay Note  DAHIANA Gay     Patient Name: Jaja Carcamo  MRN: [id number]  Today's Date: 5/28/2024    Admit Date: 5/24/2024    Plan: From home alone. SNF choices pending if agreeable. Will require precert. PASRR approved.   Discharge Plan       Row Name 05/28/24 0919       Plan    Plan From home alone. SNF choices pending if agreeable. Will require precert. PASRR approved.    Patient/Family in Agreement with Plan yes    Provided Post Acute Provider List? Yes    Post Acute Provider List Nursing Home    Delivered To Patient    Method of Delivery In person    Plan Comments CM met with patient at bedside and reviewed IMM letter, obtained signature. Also discussed therapy's recommendation of SNF Placement at discharge. SNF list provided with CM contact info. Advised pt to review list and speak to her son Aris. CM will follow up with patient later today for possible choices, if she is agreeable.             Megan Naegele, RN     Office Phone: [phone]  Office Cell: [phone]

## 2024-05-29 LAB
ANION GAP SERPL CALCULATED.3IONS-SCNC: 6.4 MMOL/L (ref 5–15)
BACTERIA SPEC AEROBE CULT: NORMAL
BACTERIA SPEC AEROBE CULT: NORMAL
BASOPHILS # BLD AUTO: 0.04 10*3/MM3 (ref 0–0.2)
BASOPHILS NFR BLD AUTO: 0.7 % (ref 0–1.5)
BUN SERPL-MCNC: 16 MG/DL (ref 8–23)
BUN/CREAT SERPL: 21.1 (ref 7–25)
CALCIUM SPEC-SCNC: 9.4 MG/DL (ref 8.6–10.5)
CHLORIDE SERPL-SCNC: 101 MMOL/L (ref 98–107)
CO2 SERPL-SCNC: 33.6 MMOL/L (ref 22–29)
CREAT SERPL-MCNC: 0.76 MG/DL (ref 0.57–1)
DEPRECATED RDW RBC AUTO: 63.5 FL (ref 37–54)
EGFRCR SERPLBLD CKD-EPI 2021: 80.8 ML/MIN/1.73
EOSINOPHIL # BLD AUTO: 0.14 10*3/MM3 (ref 0–0.4)
EOSINOPHIL NFR BLD AUTO: 2.5 % (ref 0.3–6.2)
ERYTHROCYTE [DISTWIDTH] IN BLOOD BY AUTOMATED COUNT: 17.2 % (ref 12.3–15.4)
GLUCOSE SERPL-MCNC: 94 MG/DL (ref 65–99)
HCT VFR BLD AUTO: 30.6 % (ref 34–46.6)
HGB BLD-MCNC: 8.8 G/DL (ref 12–15.9)
IMM GRANULOCYTES # BLD AUTO: 0.02 10*3/MM3 (ref 0–0.05)
IMM GRANULOCYTES NFR BLD AUTO: 0.4 % (ref 0–0.5)
LYMPHOCYTES # BLD AUTO: 0.6 10*3/MM3 (ref 0.7–3.1)
LYMPHOCYTES NFR BLD AUTO: 10.6 % (ref 19.6–45.3)
MCH RBC QN AUTO: 29 PG (ref 26.6–33)
MCHC RBC AUTO-ENTMCNC: 28.8 G/DL (ref 31.5–35.7)
MCV RBC AUTO: 101 FL (ref 79–97)
MONOCYTES # BLD AUTO: 0.7 10*3/MM3 (ref 0.1–0.9)
MONOCYTES NFR BLD AUTO: 12.3 % (ref 5–12)
NEUTROPHILS NFR BLD AUTO: 4.18 10*3/MM3 (ref 1.7–7)
NEUTROPHILS NFR BLD AUTO: 73.5 % (ref 42.7–76)
NRBC BLD AUTO-RTO: 0 /100 WBC (ref 0–0.2)
PLATELET # BLD AUTO: 152 10*3/MM3 (ref 140–450)
PMV BLD AUTO: 9.4 FL (ref 6–12)
POTASSIUM SERPL-SCNC: 4.3 MMOL/L (ref 3.5–5.2)
RBC # BLD AUTO: 3.03 10*6/MM3 (ref 3.77–5.28)
SODIUM SERPL-SCNC: 141 MMOL/L (ref 136–145)
TACROLIMUS BLD LC/MS/MS-MCNC: 10.3 NG/ML (ref 2–20)
TACROLIMUS BLD LC/MS/MS-MCNC: 10.6 NG/ML (ref 2–20)
WBC NRBC COR # BLD AUTO: 5.68 10*3/MM3 (ref 3.4–10.8)

## 2024-05-29 PROCEDURE — 97530 THERAPEUTIC ACTIVITIES: CPT

## 2024-05-29 PROCEDURE — 94799 UNLISTED PULMONARY SVC/PX: CPT

## 2024-05-29 PROCEDURE — 97112 NEUROMUSCULAR REEDUCATION: CPT

## 2024-05-29 PROCEDURE — 99232 SBSQ HOSP IP/OBS MODERATE 35: CPT | Performed by: INTERNAL MEDICINE

## 2024-05-29 PROCEDURE — 85025 COMPLETE CBC W/AUTO DIFF WBC: CPT | Performed by: INTERNAL MEDICINE

## 2024-05-29 PROCEDURE — 94664 DEMO&/EVAL PT USE INHALER: CPT

## 2024-05-29 PROCEDURE — 63710000001 PREDNISONE PER 5 MG: Performed by: INTERNAL MEDICINE

## 2024-05-29 PROCEDURE — 97116 GAIT TRAINING THERAPY: CPT

## 2024-05-29 PROCEDURE — 94761 N-INVAS EAR/PLS OXIMETRY MLT: CPT

## 2024-05-29 PROCEDURE — 63710000001 TACROLIMUS PER 1 MG: Performed by: INTERNAL MEDICINE

## 2024-05-29 PROCEDURE — 80048 BASIC METABOLIC PNL TOTAL CA: CPT | Performed by: INTERNAL MEDICINE

## 2024-05-29 RX ORDER — METOLAZONE 5 MG/1
5 TABLET ORAL ONCE
Status: COMPLETED | OUTPATIENT
Start: 2024-05-29 | End: 2024-05-29

## 2024-05-29 RX ADMIN — POTASSIUM CHLORIDE 40 MEQ: 1500 TABLET, EXTENDED RELEASE ORAL at 08:36

## 2024-05-29 RX ADMIN — DIAZEPAM 5 MG: 5 TABLET ORAL at 21:16

## 2024-05-29 RX ADMIN — DILTIAZEM HYDROCHLORIDE 90 MG: 60 TABLET, FILM COATED ORAL at 05:13

## 2024-05-29 RX ADMIN — DOCUSATE SODIUM 100 MG: 100 CAPSULE, LIQUID FILLED ORAL at 08:28

## 2024-05-29 RX ADMIN — LEVOTHYROXINE SODIUM 50 MCG: 0.05 TABLET ORAL at 05:13

## 2024-05-29 RX ADMIN — DILTIAZEM HYDROCHLORIDE 90 MG: 60 TABLET, FILM COATED ORAL at 21:09

## 2024-05-29 RX ADMIN — METOPROLOL SUCCINATE 50 MG: 50 TABLET, EXTENDED RELEASE ORAL at 08:11

## 2024-05-29 RX ADMIN — APIXABAN 5 MG: 5 TABLET, FILM COATED ORAL at 08:11

## 2024-05-29 RX ADMIN — APIXABAN 5 MG: 5 TABLET, FILM COATED ORAL at 21:09

## 2024-05-29 RX ADMIN — DILTIAZEM HYDROCHLORIDE 90 MG: 60 TABLET, FILM COATED ORAL at 14:29

## 2024-05-29 RX ADMIN — DULOXETINE HYDROCHLORIDE 40 MG: 20 CAPSULE, DELAYED RELEASE ORAL at 08:10

## 2024-05-29 RX ADMIN — FERROUS SULFATE TAB EC 324 MG (65 MG FE EQUIVALENT) 324 MG: 324 (65 FE) TABLET DELAYED RESPONSE at 08:11

## 2024-05-29 RX ADMIN — TACROLIMUS 1 MG: 1 CAPSULE ORAL at 08:36

## 2024-05-29 RX ADMIN — TACROLIMUS 1 MG: 1 CAPSULE ORAL at 21:10

## 2024-05-29 RX ADMIN — Medication 10 ML: at 21:10

## 2024-05-29 RX ADMIN — HYDROCODONE BITARTRATE AND ACETAMINOPHEN 1 TABLET: 5; 325 TABLET ORAL at 05:13

## 2024-05-29 RX ADMIN — CHOLESTYRAMINE 4 G: 4 POWDER, FOR SUSPENSION ORAL at 08:10

## 2024-05-29 RX ADMIN — PREDNISONE 5 MG: 5 TABLET ORAL at 08:36

## 2024-05-29 RX ADMIN — METOCLOPRAMIDE 5 MG: 5 TABLET ORAL at 11:00

## 2024-05-29 RX ADMIN — GUAIFENESIN 1200 MG: 600 TABLET, EXTENDED RELEASE ORAL at 08:11

## 2024-05-29 RX ADMIN — IPRATROPIUM BROMIDE AND ALBUTEROL SULFATE 3 ML: .5; 3 SOLUTION RESPIRATORY (INHALATION) at 12:38

## 2024-05-29 RX ADMIN — HYDROCODONE BITARTRATE AND ACETAMINOPHEN 1 TABLET: 5; 325 TABLET ORAL at 21:16

## 2024-05-29 RX ADMIN — METOCLOPRAMIDE 5 MG: 5 TABLET ORAL at 08:11

## 2024-05-29 RX ADMIN — POLYETHYLENE GLYCOL 3350 17 G: 17 POWDER, FOR SOLUTION ORAL at 08:36

## 2024-05-29 RX ADMIN — IPRATROPIUM BROMIDE AND ALBUTEROL SULFATE 3 ML: .5; 3 SOLUTION RESPIRATORY (INHALATION) at 18:50

## 2024-05-29 RX ADMIN — METOCLOPRAMIDE 5 MG: 5 TABLET ORAL at 21:10

## 2024-05-29 RX ADMIN — GUAIFENESIN 1200 MG: 600 TABLET, EXTENDED RELEASE ORAL at 21:09

## 2024-05-29 RX ADMIN — IPRATROPIUM BROMIDE AND ALBUTEROL SULFATE 3 ML: .5; 3 SOLUTION RESPIRATORY (INHALATION) at 14:58

## 2024-05-29 RX ADMIN — FUROSEMIDE 80 MG: 40 TABLET ORAL at 17:39

## 2024-05-29 RX ADMIN — METOCLOPRAMIDE 5 MG: 5 TABLET ORAL at 17:39

## 2024-05-29 RX ADMIN — FUROSEMIDE 80 MG: 40 TABLET ORAL at 08:11

## 2024-05-29 RX ADMIN — METOLAZONE 5 MG: 5 TABLET ORAL at 08:10

## 2024-05-29 RX ADMIN — IPRATROPIUM BROMIDE AND ALBUTEROL SULFATE 3 ML: .5; 3 SOLUTION RESPIRATORY (INHALATION) at 07:36

## 2024-05-29 RX ADMIN — Medication 10 ML: at 08:11

## 2024-05-29 NOTE — PLAN OF CARE
Goal Outcome Evaluation:         No new concerns overnight. Able to make needs known. Assist x1 to [facility]. Call light in reach.

## 2024-05-29 NOTE — CASE MANAGEMENT/SOCIAL WORK
Continued Stay Note  DAHIANA Gay     Patient Name: Jaja Carcamo  MRN: [id number]  Today's Date: 5/29/2024    Admit Date: 5/24/2024    Plan: [facility], will require precert. PASRR approved.   Discharge Plan       Row Name 05/29/24 1433       Plan    Plan [facility], will require precert. PASRR approved.    Patient/Family in Agreement with Plan yes    Plan Comments CM received update from [facility] liaison Elvie that both [facility] and [facility] can accept. She spoke to son who prefers [facility]. Updated nursing and Mariangel CASTANEDA to determine discharge date (not ready today 5/29). Pharmacy updated to [facility].             Megan Naegele, RN     Office Phone: [phone]  Office Cell: [phone]

## 2024-05-29 NOTE — PROGRESS NOTES
"RENAL/KCC:     LOS: 5 days    Patient Care Team:  Jonathan Luna APRN as PCP - General (Family Medicine)  Jak Gavin MD as Cardiologist (Cardiology)    Chief Complaint:  Fluid overload    Subjective     Interval History:   Chart reviewed  1750 cc UOP charted  BP stable  On 6L NC  Weight no lower  Still c/o SOA, \"smothering\"      Objective     Vital Sign Min/Max for last 24 hours  Temp  Min: 97.5 °F (36.4 °C)  Max: 98.6 °F (37 °C)   BP  Min: 128/72  Max: 171/65   Pulse  Min: 68  Max: 90   Resp  Min: 18  Max: 32   SpO2  Min: 88 %  Max: 99 %   Flow (L/min)  Min: 4  Max: 6   Weight  Min: 84.7 kg (186 lb 11.7 oz)  Max: 84.7 kg (186 lb 11.7 oz)     Flowsheet Rows      Flowsheet Row First Filed Value   Admission Height 167.6 cm (66\") Documented at 05/24/2024 1143   Admission Weight 86.2 kg (190 lb) Documented at 05/24/2024 1143            I/O this shift:  In: -   Out: 200 [Urine:200]  I/O last 3 completed shifts:  In: 717 [P.O.:717]  Out: 2175 [Urine:2175]    Physical Exam:  GEN: Awake, NAD  ENT: PERRL, EOMI, MMM  NECK: Supple, no JVD  CHEST: CTAB, no W/R/C  CV: RRR, no M/G/R, +edema  ABD: Soft, NT, +BS  SKIN: Warm and Dry  NEURO: CN's intact      WBC WBC   Date Value Ref Range Status   05/29/2024 5.68 3.40 - 10.80 10*3/mm3 Final   05/28/2024 6.01 3.40 - 10.80 10*3/mm3 Final   05/27/2024 6.34 3.40 - 10.80 10*3/mm3 Final      HGB Hemoglobin   Date Value Ref Range Status   05/29/2024 8.8 (L) 12.0 - 15.9 g/dL Final   05/28/2024 9.4 (L) 12.0 - 15.9 g/dL Final   05/27/2024 10.2 (L) 12.0 - 15.9 g/dL Final      HCT Hematocrit   Date Value Ref Range Status   05/29/2024 30.6 (L) 34.0 - 46.6 % Final   05/28/2024 33.7 (L) 34.0 - 46.6 % Final   05/27/2024 36.0 34.0 - 46.6 % Final      Platlets No results found for: \"LABPLAT\"   MCV MCV   Date Value Ref Range Status   05/29/2024 101.0 (H) 79.0 - 97.0 fL Final   05/28/2024 101.8 (H) 79.0 - 97.0 fL Final   05/27/2024 101.4 (H) 79.0 - 97.0 fL Final          Sodium Sodium   Date Value " "Ref Range Status   05/29/2024 141 136 - 145 mmol/L Final   05/28/2024 141 136 - 145 mmol/L Final   05/27/2024 140 136 - 145 mmol/L Final      Potassium Potassium   Date Value Ref Range Status   05/29/2024 4.3 3.5 - 5.2 mmol/L Final   05/28/2024 4.6 3.5 - 5.2 mmol/L Final   05/27/2024 4.3 3.5 - 5.2 mmol/L Final      Chloride Chloride   Date Value Ref Range Status   05/29/2024 101 98 - 107 mmol/L Final   05/28/2024 99 98 - 107 mmol/L Final   05/27/2024 97 (L) 98 - 107 mmol/L Final      CO2 CO2   Date Value Ref Range Status   05/29/2024 33.6 (H) 22.0 - 29.0 mmol/L Final   05/28/2024 35.9 (H) 22.0 - 29.0 mmol/L Final   05/27/2024 34.6 (H) 22.0 - 29.0 mmol/L Final      BUN BUN   Date Value Ref Range Status   05/29/2024 16 8 - 23 mg/dL Final   05/28/2024 19 8 - 23 mg/dL Final   05/27/2024 20 8 - 23 mg/dL Final      Creatinine Creatinine   Date Value Ref Range Status   05/29/2024 0.76 0.57 - 1.00 mg/dL Final   05/28/2024 0.84 0.57 - 1.00 mg/dL Final   05/27/2024 0.89 0.57 - 1.00 mg/dL Final      Calcium Calcium   Date Value Ref Range Status   05/29/2024 9.4 8.6 - 10.5 mg/dL Final   05/28/2024 9.9 8.6 - 10.5 mg/dL Final   05/27/2024 9.8 8.6 - 10.5 mg/dL Final      PO4 No results found for: \"CAPO4\"   Albumin No results found for: \"ALBUMIN\"   Magnesium Magnesium   Date Value Ref Range Status   05/27/2024 1.9 1.6 - 2.4 mg/dL Final      Uric Acid No results found for: \"URICACID\"        Results Review:     I reviewed the patient's new clinical results.    apixaban, 5 mg, Oral, Q12H  cholestyramine light, 1 packet, Oral, Daily  dilTIAZem, 90 mg, Oral, Q8H  docusate sodium, 100 mg, Oral, BID  DULoxetine, 40 mg, Oral, Daily  ferrous sulfate, 324 mg, Oral, Daily With Breakfast  furosemide, 80 mg, Oral, BID  guaiFENesin, 1,200 mg, Oral, BID  ipratropium-albuterol, 3 mL, Nebulization, 4x Daily - RT  levothyroxine, 50 mcg, Oral, Q AM  metoclopramide, 5 mg, Oral, 4x Daily AC & at Bedtime  metoprolol succinate XL, 50 mg, Oral, " Daily  polyethylene glycol, 17 g, Oral, Daily  potassium chloride, 40 mEq, Oral, Daily  predniSONE, 5 mg, Oral, Daily With Breakfast  sodium chloride, 10 mL, Intravenous, Q12H  tacrolimus, 1 mg, Oral, BID           Medication Review: Reviewed    Assessment & Plan     TONYA    ESRD - s/p DDKT     Chronic immunoRx     Fluid overload/CHF exacerbation     Atrial fibrillation     History of COPD     History of lung cancer     Hypokalemia     Plan:  Cr stable at 0.8 = baseline  K WNL  Continue Lasix of 80 mg PO BID and KCl 40 meq daily  Metolazone 5 mg PO x 1 today  On fluid restriction  Will follow      Naun Falcon MD  [facility]  05/29/24  11:17 EDT

## 2024-05-29 NOTE — PLAN OF CARE
"Goal Outcome Evaluation:     Bed mobility - Min-A  supine to sit  Transfers - Min-A and with rolling walker sit to stand  Ambulation - 25 feet Min-A and with rolling walker slow pace, wide base of support, short step length      If medically appropriate, Moderate Intensity Therapy recommended post-acute care. This is recommended as therapy feels the patient would require 3-4 days per week and wouldn't tolerate \"3 hour daily\" rehab intensity. SNF would be the preferred choice. If the patient does not agree to SNF, arrange HH or OP depending on home bound status. If patient is medically complex, consider LTACH. Pt requires no DME at discharge.     Pt desires Skilled Rehab placement at discharge. Pt cooperative; agreeable to therapeutic recommendations and plan of care.                                    "

## 2024-05-29 NOTE — PROGRESS NOTES
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  Atrial fibrillation with RV  Volume overload  Hx fall  Hypothyroidism    Hyperlipidemia  History of kidney transplant  Anemia  CAD  History of DVT     Pulmonary hypertension  HTN  UTI        PLAN:     Oxygen supplement and titration to maintain saturation 90-95%: Currently requiring 4-6 L per nasal cannula  Encouraged use I-S flutter valve  Antibiotics: completed 5/28/2024    Diuresis and electrolyte management as per nephrology  Electrolytes/ glycemic control  BP/HR control  Thyroid hormone replacement  Chronic anticoagulation: Apixaban     I personally reviewed the radiological images               LOS: 5 days     Subjective     Patient reports  shortness of breath    Objective     Vital signs for last 24 hours:  Vitals:    05/29/24 0740 05/29/24 0758 05/29/24 0810 05/29/24 1107   BP:  133/56 152/63 128/72   BP Location:    Left arm   Patient Position:    Lying   Pulse: 78 68  79   Resp: 25   26   Temp:    98.6 °F (37 °C)   TempSrc:    Oral   SpO2: (!) 89% (!) 88%  92%   Weight:       Height:           Intake/Output last 3 shifts:  I/O last 3 completed shifts:  In: 717 [P.O.:717]  Out: 2175 [Urine:2175]  Intake/Output this shift:  I/O this shift:  In: -   Out: 200 [Urine:200]      Radiology  Imaging Results (Last 24 Hours)       ** No results found for the last 24 hours. **            Labs:  Results from last 7 days   Lab Units 05/29/24  0446   WBC 10*3/mm3 5.68   HEMOGLOBIN g/dL 8.8*   HEMATOCRIT % 30.6*   PLATELETS 10*3/mm3 152     Results from last 7 days   Lab Units 05/29/24  0446 05/25/24  0443 05/24/24  1210   SODIUM mmol/L 141   < > 137   POTASSIUM mmol/L 4.3   < > 4.0   CHLORIDE mmol/L 101   < > 94*   CO2 mmol/L 33.6*   < > 32.9*   BUN mg/dL 16   < > 21   CREATININE mg/dL 0.76   < > 1.16*   CALCIUM mg/dL 9.4   < > 10.1   BILIRUBIN mg/dL  --   --  1.2   ALK  PHOS U/L  --   --  59   ALT (SGPT) U/L  --   --  9   AST (SGOT) U/L  --   --  16   GLUCOSE mg/dL 94   < > 154*    < > = values in this interval not displayed.     Results from last 7 days   Lab Units 05/24/24  1225   PH, ARTERIAL pH units 7.442   PO2 ART mm Hg 56.7*   PCO2, ARTERIAL mm Hg 51.4*   HCO3 ART mmol/L 35.0*     Results from last 7 days   Lab Units 05/24/24  1210   ALBUMIN g/dL 3.6     Results from last 7 days   Lab Units 05/24/24  1408 05/24/24  1210   HSTROP T ng/L 35* 43*         Results from last 7 days   Lab Units 05/27/24  0526   MAGNESIUM mg/dL 1.9     Results from last 7 days   Lab Units 05/24/24  1210   INR  1.16*   APTT seconds 30.9*     Results from last 7 days   Lab Units 05/25/24  1110 05/24/24  1408   TSH uIU/mL 2.330  --    FREE T4 ng/dL  --  1.42           Meds:   SCHEDULE  apixaban, 5 mg, Oral, Q12H  cholestyramine light, 1 packet, Oral, Daily  dilTIAZem, 90 mg, Oral, Q8H  docusate sodium, 100 mg, Oral, BID  DULoxetine, 40 mg, Oral, Daily  ferrous sulfate, 324 mg, Oral, Daily With Breakfast  furosemide, 80 mg, Oral, BID  guaiFENesin, 1,200 mg, Oral, BID  ipratropium-albuterol, 3 mL, Nebulization, 4x Daily - RT  levothyroxine, 50 mcg, Oral, Q AM  metoclopramide, 5 mg, Oral, 4x Daily AC & at Bedtime  metoprolol succinate XL, 50 mg, Oral, Daily  polyethylene glycol, 17 g, Oral, Daily  potassium chloride, 40 mEq, Oral, Daily  predniSONE, 5 mg, Oral, Daily With Breakfast  sodium chloride, 10 mL, Intravenous, Q12H  tacrolimus, 1 mg, Oral, BID      Infusions     PRNs    acetaminophen    Calcium Replacement - Follow Nurse / BPA Driven Protocol    carboxymethylcellulose sod    diazePAM    Diclofenac Sodium    hydrALAZINE    HYDROcodone-acetaminophen    loperamide    Magnesium Standard Dose Replacement - Follow Nurse / BPA Driven Protocol    nitroglycerin    ondansetron ODT **OR** ondansetron    Phosphorus Replacement - Follow Nurse / BPA Driven Protocol    Potassium Replacement - Follow Nurse / BPA  Driven Protocol    [COMPLETED] Insert Peripheral IV **AND** sodium chloride    sodium chloride    sodium chloride    Physical Exam:  General Appearance:  Alert   HEENT:  Normocephalic, without obvious abnormality, Conjunctiva/corneas clear,.   Nares normal, no drainage     Neck:  Supple, symmetrical, trachea midline.   Lungs /Chest wall:   Bilateral basal rhonchi, respirations unlabored, symmetrical wall movement.     Heart:  Regular rate and rhythm, S1 S2 normal  Abdomen: Soft, non-tender, no masses, no organomegaly.    Extremities: + Edema, no clubbing or cyanosis, right upper extremity fistula in place    ROS  Constitutional: Negative for chills, fever and malaise/fatigue.   HENT: Negative.    Eyes: Negative.    Cardiovascular: Negative.    Respiratory: Positive for cough and shortness of breath.    Skin: Negative.    Musculoskeletal: Negative.    Gastrointestinal: Negative.    Genitourinary: Negative.    Neurological: Generalized weakness      I reviewed the recent clinical results  I personally reviewed the latest radiological studies    Part of this note may be an electronic transcription/translation of spoken language to printed text using the Dragon Dictation System.

## 2024-05-29 NOTE — PROGRESS NOTES
LOS: 5 days   Patient Care Team:  Jonathan Luna APRN as PCP - General (Family Medicine)  Jak Gavin MD as Cardiologist (Cardiology)    Subjective     Patient c/o of significant exertional shortness of breath and difficulty recovering    Review of Systems   Constitutional:  Positive for activity change, appetite change and fatigue.   HENT: Negative.     Respiratory: Negative.  Positive for shortness of breath.    Cardiovascular: Negative.  Positive for leg swelling.   Genitourinary: Negative.    Musculoskeletal: Negative.    Neurological:  Positive for weakness.   Psychiatric/Behavioral: Negative.             Objective     Vital Signs  Temp:  [97.5 °F (36.4 °C)-98.6 °F (37 °C)] 98.6 °F (37 °C)  Heart Rate:  [68-90] 83  Resp:  [18-32] 25  BP: (128-171)/(50-72) 128/72      Physical Exam  Vitals reviewed.   HENT:      Head: Normocephalic and atraumatic.      Left Ear: External ear normal.      Nose: Nose normal.      Mouth/Throat:      Mouth: Mucous membranes are moist.   Eyes:      General:         Right eye: No discharge.         Left eye: No discharge.   Cardiovascular:      Rate and Rhythm: Normal rate and regular rhythm.      Pulses: Normal pulses.      Heart sounds: Normal heart sounds.   Pulmonary:      Effort: Pulmonary effort is normal.      Breath sounds: Normal breath sounds.   Abdominal:      General: Bowel sounds are normal.      Palpations: Abdomen is soft.   Musculoskeletal:         General: Normal range of motion.      Cervical back: Normal range of motion.   Skin:     General: Skin is warm.   Neurological:      Mental Status: She is alert and oriented to person, place, and time.   Psychiatric:         Behavior: Behavior normal.              Results Review:    Lab Results (last 24 hours)       Procedure Component Value Units Date/Time    Blood Culture - Blood, Arm, Left [[id number]]  (Normal) Collected: 05/24/24 1311    Specimen: Blood from Arm, Left Updated: 05/29/24 1315     Blood Culture No  growth at 5 days    Blood Culture - Blood, Arm, Left [[id number]]  (Normal) Collected: 05/24/24 1251    Specimen: Blood from Arm, Left Updated: 05/29/24 1301     Blood Culture No growth at 5 days    Tacrolimus Level [[id number]] Collected: 05/25/24 0443    Specimen: Blood from Arm, Left Updated: 05/29/24 1212     Tacrolimus 10.6 ng/mL      Comment:         Trough (immediately following                  transplant)                       15.0          Trough (steady state, 2 weeks or                  more after transplant):      3.0 - 8.0          Performed by LC-MS/MS technology.       Narrative:      Test(s) 700964-Tacrolimus (), Blood  was developed and its performance characteristics determined  by [facility]. It has not been cleared or approved by the Food  and Drug Administration.  Performed at:  [address] [place] [address]  [id number]  : Star Flores MD, Phone:  [phone]    Tacrolimus Level [[id number]] Collected: 05/25/24 1110    Specimen: Blood from Arm, Left Updated: 05/29/24 1112     Tacrolimus 10.3 ng/mL      Comment:         Trough (immediately following                  transplant)                       15.0          Trough (steady state, 2 weeks or                  more after transplant):      3.0 - 8.0          Performed by LC-MS/MS technology.       Narrative:      Test(s) 700964-Tacrolimus (), Blood  was developed and its performance characteristics determined  by [facility]. It has not been cleared or approved by the Food  and Drug Administration.  Performed at:   - [address]  [id number]  : Star Flores MD, Phone:  [phone]    Basic Metabolic Panel [[id number]]  (Abnormal) Collected: 05/29/24 0446    Specimen: Blood Updated: 05/29/24 0627     Glucose 94 mg/dL      BUN 16 mg/dL      Creatinine 0.76 mg/dL      Sodium 141 mmol/L      Potassium 4.3 mmol/L      Chloride 101 mmol/L      CO2 33.6 mmol/L       Calcium 9.4 mg/dL      BUN/Creatinine Ratio 21.1     Anion Gap 6.4 mmol/L      eGFR 80.8 mL/min/1.73     Narrative:      GFR Normal >60  Chronic Kidney Disease <60  Kidney Failure <15    The GFR formula is only valid for adults with stable renal function between ages 18 and 70.    CBC & Differential [[id number]]  (Abnormal) Collected: 05/29/24 0446    Specimen: Blood Updated: 05/29/24 0600    Narrative:      The following orders were created for panel order CBC & Differential.  Procedure                               Abnormality         Status                     ---------                               -----------         ------                     CBC Auto Differential[[id number]]        Abnormal            Final result                 Please view results for these tests on the individual orders.    CBC Auto Differential [[id number]]  (Abnormal) Collected: 05/29/24 0446    Specimen: Blood Updated: 05/29/24 0600     WBC 5.68 10*3/mm3      RBC 3.03 10*6/mm3      Hemoglobin 8.8 g/dL      Hematocrit 30.6 %      .0 fL      MCH 29.0 pg      MCHC 28.8 g/dL      RDW 17.2 %      RDW-SD 63.5 fl      MPV 9.4 fL      Platelets 152 10*3/mm3      Neutrophil % 73.5 %      Lymphocyte % 10.6 %      Monocyte % 12.3 %      Eosinophil % 2.5 %      Basophil % 0.7 %      Immature Grans % 0.4 %      Neutrophils, Absolute 4.18 10*3/mm3      Lymphocytes, Absolute 0.60 10*3/mm3      Monocytes, Absolute 0.70 10*3/mm3      Eosinophils, Absolute 0.14 10*3/mm3      Basophils, Absolute 0.04 10*3/mm3      Immature Grans, Absolute 0.02 10*3/mm3      nRBC 0.0 /100 WBC              Imaging Results (Last 24 Hours)       ** No results found for the last 24 hours. **                 I reviewed the patient's new clinical results.    Medication Review:   Scheduled Meds:apixaban, 5 mg, Oral, Q12H  cholestyramine light, 1 packet, Oral, Daily  dilTIAZem, 90 mg, Oral, Q8H  docusate sodium, 100 mg, Oral, BID  DULoxetine, 40 mg, Oral, Daily  ferrous  sulfate, 324 mg, Oral, Daily With Breakfast  furosemide, 80 mg, Oral, BID  guaiFENesin, 1,200 mg, Oral, BID  ipratropium-albuterol, 3 mL, Nebulization, 4x Daily - RT  levothyroxine, 50 mcg, Oral, Q AM  metoclopramide, 5 mg, Oral, 4x Daily AC & at Bedtime  metoprolol succinate XL, 50 mg, Oral, Daily  polyethylene glycol, 17 g, Oral, Daily  potassium chloride, 40 mEq, Oral, Daily  predniSONE, 5 mg, Oral, Daily With Breakfast  sodium chloride, 10 mL, Intravenous, Q12H  tacrolimus, 1 mg, Oral, BID      Continuous Infusions:   PRN Meds:.  acetaminophen    Calcium Replacement - Follow Nurse / BPA Driven Protocol    carboxymethylcellulose sod    diazePAM    Diclofenac Sodium    hydrALAZINE    HYDROcodone-acetaminophen    loperamide    Magnesium Standard Dose Replacement - Follow Nurse / BPA Driven Protocol    nitroglycerin    ondansetron ODT **OR** ondansetron    Phosphorus Replacement - Follow Nurse / BPA Driven Protocol    Potassium Replacement - Follow Nurse / BPA Driven Protocol    [COMPLETED] Insert Peripheral IV **AND** sodium chloride    sodium chloride    sodium chloride     Interval History:    Assessment & Plan     Acute exacerbation of heart failure with preserved ejection fraction  Pulmonary hypertension/severe tricuspid valve regurgitation  Nonobstructive CAD  -IV diuresis   - nephrology team following    Nausea  -cont bowel regimen/pepcid/reglan     Hx of atrial fibrillation  - Eliquis with rate controlled  -Unable to take Cardizem CD due to interaction with immunosuppressants      ESRD previous HD with fistula rt arm/transplant recipient   -Nephrology following  -prednisone and prograf    Discharge plan: [facility] 1 to 2 days    TONYA Mathis  05/29/24  13:18 EDT

## 2024-05-29 NOTE — PLAN OF CARE
Goal Outcome Evaluation: Plan is for patient to discharge to [facility] when medically stable.

## 2024-05-29 NOTE — THERAPY TREATMENT NOTE
"Subjective: Pt agreeable to therapeutic plan of care.    Objective:     Bed mobility - Min-A  supine to sit  Transfers - Min-A and with rolling walker sit to stand  Ambulation - 25 feet Min-A and with rolling walker slow pace, wide base of support, short step length    Vitals: WNL    Pain: 5 VAS   Location: ble's.    Intervention for pain: Repositioned, RN provided medication, Increased Activity, and Therapeutic Presence    Education: Provided education on the importance of mobility in the acute care setting, Verbal/Tactile Cues, Transfer Training, and Gait Training    Assessment: Jaja Carcamo presents with functional mobility impairments which indicate the need for skilled intervention. Tolerating session today without incident. Pt is making slow progress but has good potential to cont to improve her mobility.  Pt's son is very encouraging to patient.  Will continue to follow and progress as tolerated.     Plan/Recommendations:   If medically appropriate, Moderate Intensity Therapy recommended post-acute care. This is recommended as therapy feels the patient would require 3-4 days per week and wouldn't tolerate \"3 hour daily\" rehab intensity. SNF would be the preferred choice. If the patient does not agree to SNF, arrange HH or OP depending on home bound status. If patient is medically complex, consider LTACH. Pt requires no DME at discharge.     Pt desires Skilled Rehab placement at discharge. Pt cooperative; agreeable to therapeutic recommendations and plan of care.     Basic Mobility 6-click:  Rollin = Total, A lot = 2, A little = 3; 4 = None  Supine>Sit:   1 = Total, A lot = 2, A little = 3; 4 = None   Sit>Stand with arms:  1 = Total, A lot = 2, A little = 3; 4 = None  Bed>Chair:   1 = Total, A lot = 2, A little = 3; 4 = None  Ambulate in room:  1 = Total, A lot = 2, A little = 3; 4 = None  3-5 Steps with railin = Total, A lot = 2, A little = 3; 4 = None  Score: 16    Modified [place]: 4 = " Moderately severe disability (Unable to attend to own bodily needs without assistance, and unable to walk unassisted)     Post-Tx Position: Up in Chair, Alarms activated, and Call light and personal items within reach  pt's son rpresent  PPE: gloves and gown

## 2024-05-29 NOTE — PROGRESS NOTES
Referring Provider: Juanis Lowe MD    Reason for follow-up: Shortness of breath     Patient Care Team:  Jonathan Luna APRN as PCP - General (Family Medicine)  Jak Gavin MD as Cardiologist (Cardiology)      SUBJECTIVE  Complains of shortness of breath.     ROS  Review of all systems negative except as indicated.    Since I have last seen, the patient has been without any chest discomfort, shortness of breath, palpitations, dizziness or syncope.  Denies having any headache, abdominal pain, nausea, vomiting, diarrhea, constipation, loss of weight or loss of appetite.  Denies having any excessive bruising, hematuria or blood in the stool.  ROS      Personal History:    Past Medical History:   Diagnosis Date    Allergic rhinitis 04/14/2015    Asthma 08/10/2017    Atrial flutter 05/11/2017    Chronic diarrhea 04/15/2019    Chronic hypoxemic respiratory failure 01/18/2024    Chronic pain 02/03/2015    COPD (chronic obstructive pulmonary disease)     COVID-19 virus detected 08/11/2022    Cytokine release syndrome, grade 1 08/16/2022    Edema of both lower extremities due to peripheral venous insufficiency 03/12/2021    ESRD on hemodialysis 05/22/2013    Essential (primary) hypertension 03/03/2022    Fracture of ulnar styloid 05/19/2022    Fracture of unspecified carpal bone, right wrist, subsequent encounter for fracture with routine healing 03/03/2022    Gastroesophageal reflux disease 11/12/2020    Gout 08/10/2017    Hearing loss 08/10/2017    History of appendectomy     History of DVT (deep vein thrombosis) 11/12/2020    History of kidney transplant 06/30/2017    History of lobectomy of lung 01/18/2024 03/08/2016:  RIGHT Lower Lobe Mass--> Right Video-assisted thoracoscopy with a moderate-to-large wedge resection of the RLL (by Dr. Haris Mcconnell @ [facility])--> Poorly differentiated carcinoma of the RLL.      History of repair of hip joint 05/21/2013    History of suicide attempt 05/20/2024     Hyperlipidemia 08/11/2014    Hypothyroidism, unspecified 03/03/2022    Infection due to extended spectrum beta lactamase (ESBL) producing bacteria 03/11/2016    No A2K system hx. +ESBL E coli urine on 3/11/16.      Irritable bowel syndrome 04/15/2019    Long term (current) use of immunosuppressive biologic 04/28/2021    Long term current use of anticoagulant therapy 01/18/2024    Malignant neoplasm of lung 08/10/2017    Menopausal flushing 08/30/2021    Mitral valve regurgitation 07/06/2015    Mixed anxiety and depressive disorder 04/30/2015    Non-small cell lung cancer 08/10/2017    Nonobstructive atherosclerosis of coronary artery 06/02/2019 08/12/2022: CATH: [facility]: NSTEMI assoc with Covid-19: LM:-nl;  LAD: diffuse, Ca++; 30%; CIRC: Dominant. Normal; RCA: small; 50% diffuse, proximal and mid-segment.      NSTEMI (non-ST elevated myocardial infarction) 08/11/2022    Obsessive-compulsive disorder 04/15/2019    Osteoarthritis 05/20/2024    Paroxysmal atrial fibrillation 05/11/2017    Paroxysmal supraventricular tachycardia 05/11/2017    Peripheral neuropathy 08/11/2014    Personal history of peptic ulcer disease 11/12/2020    Postoperative anemia due to acute blood loss 05/22/2013    Right wrist fracture 03/01/2022    Seasonal allergies 08/10/2017    Secondary hyperparathyroidism of renal origin 09/14/2015    Tricuspid valve regurgitation 03/15/2017    Ulcer of lower extremity 08/30/2021    Unspecified acute appendicitis 03/03/2022    Valvular heart disease 05/20/2024    Wegener's granulomatosis with renal involvement 03/03/2022       Past Surgical History:   Procedure Laterality Date    APPENDECTOMY      BREAST SURGERY Left     cysts rmeoved    CARDIAC CATHETERIZATION Right 08/12/2022    Procedure: Left Heart Cath and coronary angiogram;  Surgeon: Jak Gavin MD;  Location: [facility] INVASIVE LOCATION;  Service: Cardiology;  Laterality: Right;    CLOSED REDUCTION WRIST FRACTURE Right 03/01/2022     Procedure: WRIST CLOSED REDUCTION;  Surgeon: Gaudencio Townsend MD;  Location: [facility] MAIN OR;  Service: Orthopedics;  Laterality: Right;    CYSTOSCOPY      HIP ARTHROPLASTY      HYSTERECTOMY      LUNG LOBECTOMY Right     TRANSPLANTATION RENAL         Family History   Problem Relation Age of Onset    No Known Problems Mother     No Known Problems Father        Social History     Tobacco Use    Smoking status: Former    Smokeless tobacco: Never   Vaping Use    Vaping status: Former   Substance Use Topics    Alcohol use: Never    Drug use: Never        Home meds:  Prior to Admission medications    Medication Sig Start Date End Date Taking? Authorizing Provider   acetaminophen (Tylenol) 325 MG tablet Take 2 tablets by mouth Every 4 (Four) Hours As Needed for Fever or Mild Pain. 3/13/20   Court Vences MD   albuterol sulfate  (90 Base) MCG/ACT inhaler Inhale 2 puffs Every 6 (Six) Hours As Needed for Wheezing.    Court Vences MD   apixaban (ELIQUIS) 5 MG tablet tablet Take 1 tablet by mouth Every 12 (Twelve) Hours. 8/16/22   Clyde White,    Carboxymethylcellulose Sodium (DRY EYE RELIEF OP) Apply 2 drops to eye(s) as directed by provider 2 (Two) Times a Day As Needed (dry eyes). 5/11/22   Court Vences MD   colestipol (COLESTID) 1 g tablet Take 1 tablet by mouth Daily.    Court Vences MD   diazePAM (VALIUM) 5 MG tablet Take 5 mg by mouth 2 (Two) Times a Day As Needed for Anxiety.    Court Vences MD   Diclofenac Sodium (Aspercreme Arthritis Pain) 1 % gel gel Apply 4 g topically to the appropriate area as directed 2 (Two) Times a Day As Needed (pain). 10/21/20   Court Vences MD   dilTIAZem (CARDIZEM) 90 MG tablet Take 1 tablet by mouth Every 8 (Eight) Hours. 5/22/24   Mariangel Stearns APRN   DULoxetine HCl 40 MG capsule delayed-release particles Take 1 capsule by mouth Daily.    Court Vences MD   ferrous sulfate 324 (65 Fe) MG tablet  delayed-release EC tablet Take 1 tablet by mouth Daily With Breakfast. 5/23/24   Mariangel Stearns APRN   furosemide (LASIX) 80 MG tablet Take 1 tablet by mouth 2 (Two) Times a Day. 5/22/24   Mariangel Stearns APRN   guaiFENesin (Mucinex) 600 MG 12 hr tablet Take 1,200 mg by mouth 2 (Two) Times a Day. 8/16/22   Court Vences MD   levothyroxine (SYNTHROID, LEVOTHROID) 50 MCG tablet Take 1 tablet by mouth Daily.    Court Vences MD   loperamide (IMODIUM) 2 MG capsule Take 2 mg by mouth 4 (Four) Times a Day As Needed for Diarrhea. 3/13/20   Court Vences MD   metoprolol succinate XL (TOPROL-XL) 50 MG 24 hr tablet Take 50 mg by mouth Daily.    Court Vences MD   Xxvsy-Nojne-Wxuutll-Pramoxine (Neosporin + Pain Relief Max St) 1 % ointment Apply 1 application topically to the appropriate area as directed 2 (Two) Times a Day As Needed (sores). 7/2/20   Court Vences MD   potassium chloride (KLOR-CON M20) 20 MEQ CR tablet Take 2 tablets by mouth Daily. 5/22/24   Mariangel Stearns APRN   predniSONE (DELTASONE) 5 MG tablet Take 5 mg by mouth Daily.    Court Vences MD   Salicylic Acid-Urea (KERASAL EX) Apply 1 application topically to the appropriate area as directed 2 (Two) Times a Day As Needed (dry feet). 11/23/19   Court Vences MD   tacrolimus (PROGRAF) 1 MG capsule Take 1 mg by mouth 2 (Two) Times a Day.    Court Vences MD       Allergies:  Zolpidem    Scheduled Meds:apixaban, 5 mg, Oral, Q12H  cholestyramine light, 1 packet, Oral, Daily  dilTIAZem, 90 mg, Oral, Q8H  docusate sodium, 100 mg, Oral, BID  DULoxetine, 40 mg, Oral, Daily  ferrous sulfate, 324 mg, Oral, Daily With Breakfast  furosemide, 80 mg, Oral, BID  guaiFENesin, 1,200 mg, Oral, BID  ipratropium-albuterol, 3 mL, Nebulization, 4x Daily - RT  levothyroxine, 50 mcg, Oral, Q AM  metoclopramide, 5 mg, Oral, 4x Daily AC & at Bedtime  metoprolol succinate XL, 50 mg, Oral,  "Daily  polyethylene glycol, 17 g, Oral, Daily  potassium chloride, 40 mEq, Oral, Daily  predniSONE, 5 mg, Oral, Daily With Breakfast  sodium chloride, 10 mL, Intravenous, Q12H  tacrolimus, 1 mg, Oral, BID      Continuous Infusions:   PRN Meds:.  acetaminophen    Calcium Replacement - Follow Nurse / BPA Driven Protocol    carboxymethylcellulose sod    diazePAM    Diclofenac Sodium    hydrALAZINE    HYDROcodone-acetaminophen    loperamide    Magnesium Standard Dose Replacement - Follow Nurse / BPA Driven Protocol    nitroglycerin    ondansetron ODT **OR** ondansetron    Phosphorus Replacement - Follow Nurse / BPA Driven Protocol    Potassium Replacement - Follow Nurse / BPA Driven Protocol    [COMPLETED] Insert Peripheral IV **AND** sodium chloride    sodium chloride    sodium chloride      OBJECTIVE    Vital Signs  Vitals:    05/29/24 0736 05/29/24 0740 05/29/24 0758 05/29/24 0810   BP:   133/56 152/63   BP Location:       Patient Position:       Pulse: 77 78 68    Resp: 25 25     Temp:       TempSrc:       SpO2: 90% (!) 89% (!) 88%    Weight:       Height:           Flowsheet Rows      Flowsheet Row First Filed Value   Admission Height 167.6 cm (66\") Documented at 05/24/2024 1143   Admission Weight 86.2 kg (190 lb) Documented at 05/24/2024 1143              Intake/Output Summary (Last 24 hours) at 5/29/2024 0857  Last data filed at 5/29/2024 0715  Gross per 24 hour   Intake 480 ml   Output 1950 ml   Net -1470 ml          Telemetry: Atrial fibrillation with heart rate in the 60s and 70s    Physical Exam:  The patient is alert, oriented and in no distress.  Obese  Vital signs as noted above.  Head and neck revealed no carotid bruits or jugular venous distention.  No thyromegaly or lymphadenopathy is present  Lungs clear.  No wheezing.  Breath sounds are normal bilaterally.  Heart normal first and second heart sounds.  No murmur. No precordial rub is present.  No gallop is present.  Abdomen soft and nontender.  No " organomegaly is present.  Extremities with good peripheral pulses without any pedal edema.  Skin warm and dry.  Musculoskeletal system is grossly normal.  CNS grossly normal.       Results Review:  I have personally reviewed the results from the time of this admission to 5/29/2024 08:57 EDT and agree with these findings:  []  Laboratory  []  Microbiology  []  Radiology  []  EKG/Telemetry   []  Cardiology/Vascular   []  Pathology  []  Old records  []  Other:    Most notable findings include:    Lab Results (last 24 hours)       Procedure Component Value Units Date/Time    Basic Metabolic Panel [[id number]]  (Abnormal) Collected: 05/29/24 0446    Specimen: Blood Updated: 05/29/24 0627     Glucose 94 mg/dL      BUN 16 mg/dL      Creatinine 0.76 mg/dL      Sodium 141 mmol/L      Potassium 4.3 mmol/L      Chloride 101 mmol/L      CO2 33.6 mmol/L      Calcium 9.4 mg/dL      BUN/Creatinine Ratio 21.1     Anion Gap 6.4 mmol/L      eGFR 80.8 mL/min/1.73     Narrative:      GFR Normal >60  Chronic Kidney Disease <60  Kidney Failure <15    The GFR formula is only valid for adults with stable renal function between ages 18 and 70.    CBC & Differential [[id number]]  (Abnormal) Collected: 05/29/24 0446    Specimen: Blood Updated: 05/29/24 0600    Narrative:      The following orders were created for panel order CBC & Differential.  Procedure                               Abnormality         Status                     ---------                               -----------         ------                     CBC Auto Differential[[id number]]        Abnormal            Final result                 Please view results for these tests on the individual orders.    CBC Auto Differential [[id number]]  (Abnormal) Collected: 05/29/24 0446    Specimen: Blood Updated: 05/29/24 0600     WBC 5.68 10*3/mm3      RBC 3.03 10*6/mm3      Hemoglobin 8.8 g/dL      Hematocrit 30.6 %      .0 fL      MCH 29.0 pg      MCHC 28.8 g/dL      RDW 17.2 %       RDW-SD 63.5 fl      MPV 9.4 fL      Platelets 152 10*3/mm3      Neutrophil % 73.5 %      Lymphocyte % 10.6 %      Monocyte % 12.3 %      Eosinophil % 2.5 %      Basophil % 0.7 %      Immature Grans % 0.4 %      Neutrophils, Absolute 4.18 10*3/mm3      Lymphocytes, Absolute 0.60 10*3/mm3      Monocytes, Absolute 0.70 10*3/mm3      Eosinophils, Absolute 0.14 10*3/mm3      Basophils, Absolute 0.04 10*3/mm3      Immature Grans, Absolute 0.02 10*3/mm3      nRBC 0.0 /100 WBC     Blood Culture - Blood, Arm, Left [[id number]]  (Normal) Collected: 05/24/24 1311    Specimen: Blood from Arm, Left Updated: 05/28/24 1315     Blood Culture No growth at 4 days    Blood Culture - Blood, Arm, Left [[id number]]  (Normal) Collected: 05/24/24 1251    Specimen: Blood from Arm, Left Updated: 05/28/24 1300     Blood Culture No growth at 4 days            Imaging Results (Last 24 Hours)       ** No results found for the last 24 hours. **            LAB RESULTS (LAST 7 DAYS)    CBC  Results from last 7 days   Lab Units 05/29/24  0446 05/28/24  0450 05/27/24  0526 05/26/24  0404 05/25/24  0443 05/24/24  1210   WBC 10*3/mm3 5.68 6.01 6.34 5.25 5.75 7.75   RBC 10*6/mm3 3.03* 3.31* 3.55* 3.11* 3.39* 3.29*   HEMOGLOBIN g/dL 8.8* 9.4* 10.2* 8.9* 9.6* 9.6*   HEMATOCRIT % 30.6* 33.7* 36.0 31.0* 33.8* 32.7*   MCV fL 101.0* 101.8* 101.4* 99.7* 99.7* 99.4*   PLATELETS 10*3/mm3 152 161 168 143 143 135*       BMP  Results from last 7 days   Lab Units 05/29/24  0446 05/28/24  0450 05/27/24  0526 05/26/24  0404 05/25/24  0443 05/24/24  1408 05/24/24  1210   SODIUM mmol/L 141 141 140 142 141  --  137   POTASSIUM mmol/L 4.3 4.6 4.3 4.2 4.0  --  4.0   CHLORIDE mmol/L 101 99 97* 98 97*  --  94*   CO2 mmol/L 33.6* 35.9* 34.6* 36.3* 36.0*  --  32.9*   BUN mg/dL 16 19 20 25* 24*  --  21   CREATININE mg/dL 0.76 0.84 0.89 1.10* 1.16*  --  1.16*   GLUCOSE mg/dL 94 98 105* 91 104*  --  154*   MAGNESIUM mg/dL  --   --  1.9 1.8 2.1 1.8  --    PHOSPHORUS mg/dL  --   --    --   --  4.3  --   --        CMP   Results from last 7 days   Lab Units 05/29/24  0446 05/28/24  0450 05/27/24  0526 05/26/24  0404 05/25/24  0443 05/24/24  1210   SODIUM mmol/L 141 141 140 142 141 137   POTASSIUM mmol/L 4.3 4.6 4.3 4.2 4.0 4.0   CHLORIDE mmol/L 101 99 97* 98 97* 94*   CO2 mmol/L 33.6* 35.9* 34.6* 36.3* 36.0* 32.9*   BUN mg/dL 16 19 20 25* 24* 21   CREATININE mg/dL 0.76 0.84 0.89 1.10* 1.16* 1.16*   GLUCOSE mg/dL 94 98 105* 91 104* 154*   ALBUMIN g/dL  --   --   --   --   --  3.6   BILIRUBIN mg/dL  --   --   --   --   --  1.2   ALK PHOS U/L  --   --   --   --   --  59   AST (SGOT) U/L  --   --   --   --   --  16   ALT (SGPT) U/L  --   --   --   --   --  9       BNP        TROPONIN  Results from last 7 days   Lab Units 05/24/24  1408   HSTROP T ng/L 35*       CoAg  Results from last 7 days   Lab Units 05/24/24  1210   INR  1.16*   APTT seconds 30.9*       Creatinine Clearance  Estimated Creatinine Clearance: 68 mL/min (by C-G formula based on SCr of 0.76 mg/dL).    ABG  Results from last 7 days   Lab Units 05/24/24  1225   PH, ARTERIAL pH units 7.442   PCO2, ARTERIAL mm Hg 51.4*   PO2 ART mm Hg 56.7*   O2 SATURATION ART % 89.5*   BASE EXCESS ART mmol/L 9.6*       Radiology  No radiology results for the last day      EKG  I personally viewed and interpreted the patient's EKG/Telemetry data:  ECG 12 Lead Dyspnea   Final Result   HEART RATE= 82  bpm   RR Interval= 732  ms   FL Interval=   ms   P Horizontal Axis=   deg   P Front Axis=   deg   QRSD Interval= 88  ms   QT Interval= 407  ms   QTcB= 476  ms   QRS Axis= -32  deg   T Wave Axis= 49  deg   - ABNORMAL ECG -   Atrial fibrillation   Left axis deviation   When compared with ECG of 20-May-2024 9:37:54,   Significant rate increase   Significant repolarization change   Electronically Signed By: Praneeth Theodore (Derek) 25-May-2024 07:53:06   Date and Time of Study: 2024-05-24 11:36:06      Telemetry Scan   Final Result      Telemetry Scan   Final Result       Telemetry Scan   Final Result      Telemetry Scan   Final Result      Telemetry Scan   Final Result      Telemetry Scan   Final Result      Telemetry Scan   Final Result      Telemetry Scan   Final Result      Telemetry Scan   Final Result            Echocardiogram:    Results for orders placed during the hospital encounter of 05/17/24    Adult Transthoracic Echo Complete W/ Cont if Necessary Per Protocol    Interpretation Summary    Left ventricular systolic function is normal. Calculated left ventricular EF = 55% Left ventricular ejection fraction appears to be 51 - 55%.    Left ventricular diastolic function was indeterminate.    Left atrial volume is moderately increased.    The right atrial cavity is dilated.    Severe tricuspid valve regurgitation is present.    Estimated right ventricular systolic pressure from tricuspid regurgitation is markedly elevated (>55 mmHg).    Mild to moderate mitral valve regurgitation is present        Stress Test:         Cardiac Catheterization:  Results for orders placed during the hospital encounter of 08/11/22    Cardiac Catheterization/Vascular Study    Conclusion  IMPRESSIONS  Non obstructive CAD         Other:         ASSESSMENT & PLAN:    Principal Problem:    Acute exacerbation of CHF (congestive heart failure)      Atrial fibrillation  [id number] score is 5  Continue Eliquis for stroke prevention  Heart rate is well-controlled on Toprol-XL and Cardizem.  Unable to take Cardizem CD due to interaction with immunosuppressants.  TSH is normal     HFpEF/Pulmonary hypertension  Presented with proBNP of 4000 (compared to 8000 during previous admission)  She has chronic HFpEF and this was not an exacerbation.  Patient is euvolemic and now on oral diuretics  Monitor I's and O's and replace electrolytes as needed  Continue Toprol-XL  Okay to start Jardiance.  Creatinine is normal  Echocardiogram shows preserved LV function with severe tricuspid valve regurgitation and severe  pulmonary hypertension.     UTI  On antibiotics     History of kidney transplant  On prednisone and Prograf  Creatinine 0.76, GFR is 80.8  Continue Lasix  Metolazone as needed per nephrology.  Bicarb is 34     COPD  History of lung cancer  CT shows minimal interval enlargement of the tissue surrounding the postsurgical changes associated with small bilateral pleural effusions.  Continue bronchodilators.  She should follow-up with an oncologist outpatient.     Obesity  Lifestyle modifications recommended to the patient.  She weighs 186 pounds.  BMI is 30.14    Jak Gavin MD  05/29/24  08:57 EDT

## 2024-05-30 LAB
ANION GAP SERPL CALCULATED.3IONS-SCNC: 7.4 MMOL/L (ref 5–15)
BASOPHILS # BLD AUTO: 0.03 10*3/MM3 (ref 0–0.2)
BASOPHILS NFR BLD AUTO: 0.5 % (ref 0–1.5)
BUN SERPL-MCNC: 15 MG/DL (ref 8–23)
BUN/CREAT SERPL: 20 (ref 7–25)
CALCIUM SPEC-SCNC: 9.8 MG/DL (ref 8.6–10.5)
CHLORIDE SERPL-SCNC: 99 MMOL/L (ref 98–107)
CO2 SERPL-SCNC: 33.6 MMOL/L (ref 22–29)
CREAT SERPL-MCNC: 0.75 MG/DL (ref 0.57–1)
DEPRECATED RDW RBC AUTO: 62.4 FL (ref 37–54)
EGFRCR SERPLBLD CKD-EPI 2021: 82.1 ML/MIN/1.73
EOSINOPHIL # BLD AUTO: 0.15 10*3/MM3 (ref 0–0.4)
EOSINOPHIL NFR BLD AUTO: 2.7 % (ref 0.3–6.2)
ERYTHROCYTE [DISTWIDTH] IN BLOOD BY AUTOMATED COUNT: 17.3 % (ref 12.3–15.4)
GLUCOSE SERPL-MCNC: 91 MG/DL (ref 65–99)
HCT VFR BLD AUTO: 30.7 % (ref 34–46.6)
HGB BLD-MCNC: 9 G/DL (ref 12–15.9)
IMM GRANULOCYTES # BLD AUTO: 0.02 10*3/MM3 (ref 0–0.05)
IMM GRANULOCYTES NFR BLD AUTO: 0.4 % (ref 0–0.5)
LYMPHOCYTES # BLD AUTO: 0.53 10*3/MM3 (ref 0.7–3.1)
LYMPHOCYTES NFR BLD AUTO: 9.5 % (ref 19.6–45.3)
MCH RBC QN AUTO: 29.4 PG (ref 26.6–33)
MCHC RBC AUTO-ENTMCNC: 29.3 G/DL (ref 31.5–35.7)
MCV RBC AUTO: 100.3 FL (ref 79–97)
MONOCYTES # BLD AUTO: 0.6 10*3/MM3 (ref 0.1–0.9)
MONOCYTES NFR BLD AUTO: 10.7 % (ref 5–12)
NEUTROPHILS NFR BLD AUTO: 4.26 10*3/MM3 (ref 1.7–7)
NEUTROPHILS NFR BLD AUTO: 76.2 % (ref 42.7–76)
NRBC BLD AUTO-RTO: 0 /100 WBC (ref 0–0.2)
PLATELET # BLD AUTO: 149 10*3/MM3 (ref 140–450)
PMV BLD AUTO: 9.4 FL (ref 6–12)
POTASSIUM SERPL-SCNC: 3.8 MMOL/L (ref 3.5–5.2)
RBC # BLD AUTO: 3.06 10*6/MM3 (ref 3.77–5.28)
SODIUM SERPL-SCNC: 140 MMOL/L (ref 136–145)
TACROLIMUS BLD LC/MS/MS-MCNC: 7 NG/ML (ref 2–20)
WBC NRBC COR # BLD AUTO: 5.59 10*3/MM3 (ref 3.4–10.8)

## 2024-05-30 PROCEDURE — 63710000001 PREDNISONE PER 5 MG: Performed by: INTERNAL MEDICINE

## 2024-05-30 PROCEDURE — 97535 SELF CARE MNGMENT TRAINING: CPT

## 2024-05-30 PROCEDURE — 94799 UNLISTED PULMONARY SVC/PX: CPT

## 2024-05-30 PROCEDURE — 80048 BASIC METABOLIC PNL TOTAL CA: CPT | Performed by: INTERNAL MEDICINE

## 2024-05-30 PROCEDURE — 97530 THERAPEUTIC ACTIVITIES: CPT

## 2024-05-30 PROCEDURE — 94761 N-INVAS EAR/PLS OXIMETRY MLT: CPT

## 2024-05-30 PROCEDURE — 97112 NEUROMUSCULAR REEDUCATION: CPT

## 2024-05-30 PROCEDURE — 63710000001 DEXAMETHASONE PER 0.25 MG: Performed by: INTERNAL MEDICINE

## 2024-05-30 PROCEDURE — 94664 DEMO&/EVAL PT USE INHALER: CPT

## 2024-05-30 PROCEDURE — 99232 SBSQ HOSP IP/OBS MODERATE 35: CPT | Performed by: INTERNAL MEDICINE

## 2024-05-30 PROCEDURE — 85025 COMPLETE CBC W/AUTO DIFF WBC: CPT | Performed by: INTERNAL MEDICINE

## 2024-05-30 PROCEDURE — 25010000002 FUROSEMIDE PER 20 MG: Performed by: INTERNAL MEDICINE

## 2024-05-30 PROCEDURE — 63710000001 TACROLIMUS PER 1 MG: Performed by: INTERNAL MEDICINE

## 2024-05-30 RX ORDER — METOLAZONE 5 MG/1
5 TABLET ORAL ONCE
Status: COMPLETED | OUTPATIENT
Start: 2024-05-30 | End: 2024-05-30

## 2024-05-30 RX ORDER — DEXAMETHASONE 4 MG/1
4 TABLET ORAL
Status: COMPLETED | OUTPATIENT
Start: 2024-05-30 | End: 2024-06-03

## 2024-05-30 RX ORDER — HYDROCODONE BITARTRATE AND ACETAMINOPHEN 5; 325 MG/1; MG/1
1 TABLET ORAL EVERY 8 HOURS PRN
Status: DISCONTINUED | OUTPATIENT
Start: 2024-05-30 | End: 2024-06-03 | Stop reason: HOSPADM

## 2024-05-30 RX ORDER — FUROSEMIDE 10 MG/ML
40 INJECTION INTRAMUSCULAR; INTRAVENOUS ONCE
Status: COMPLETED | OUTPATIENT
Start: 2024-05-30 | End: 2024-05-30

## 2024-05-30 RX ORDER — BUDESONIDE 0.5 MG/2ML
0.5 INHALANT ORAL
Status: DISCONTINUED | OUTPATIENT
Start: 2024-05-30 | End: 2024-06-03 | Stop reason: HOSPADM

## 2024-05-30 RX ORDER — METOLAZONE 5 MG/1
5 TABLET ORAL 3 TIMES WEEKLY
Status: DISCONTINUED | OUTPATIENT
Start: 2024-05-31 | End: 2024-06-03 | Stop reason: HOSPADM

## 2024-05-30 RX ADMIN — FUROSEMIDE 40 MG/HR: 10 INJECTION, SOLUTION INTRAMUSCULAR; INTRAVENOUS at 18:21

## 2024-05-30 RX ADMIN — DEXAMETHASONE 4 MG: 4 TABLET ORAL at 11:04

## 2024-05-30 RX ADMIN — METOCLOPRAMIDE 5 MG: 5 TABLET ORAL at 20:29

## 2024-05-30 RX ADMIN — POLYETHYLENE GLYCOL 3350 17 G: 17 POWDER, FOR SOLUTION ORAL at 09:00

## 2024-05-30 RX ADMIN — GUAIFENESIN 1200 MG: 600 TABLET, EXTENDED RELEASE ORAL at 20:29

## 2024-05-30 RX ADMIN — TACROLIMUS 1 MG: 1 CAPSULE ORAL at 11:04

## 2024-05-30 RX ADMIN — PREDNISONE 5 MG: 5 TABLET ORAL at 09:00

## 2024-05-30 RX ADMIN — IPRATROPIUM BROMIDE AND ALBUTEROL SULFATE 3 ML: .5; 3 SOLUTION RESPIRATORY (INHALATION) at 06:58

## 2024-05-30 RX ADMIN — FUROSEMIDE 40 MG: 10 INJECTION, SOLUTION INTRAMUSCULAR; INTRAVENOUS at 16:35

## 2024-05-30 RX ADMIN — METOCLOPRAMIDE 5 MG: 5 TABLET ORAL at 09:00

## 2024-05-30 RX ADMIN — APIXABAN 5 MG: 5 TABLET, FILM COATED ORAL at 20:29

## 2024-05-30 RX ADMIN — DILTIAZEM HYDROCHLORIDE 90 MG: 60 TABLET, FILM COATED ORAL at 16:35

## 2024-05-30 RX ADMIN — DULOXETINE HYDROCHLORIDE 40 MG: 20 CAPSULE, DELAYED RELEASE ORAL at 11:04

## 2024-05-30 RX ADMIN — POTASSIUM CHLORIDE 40 MEQ: 1500 TABLET, EXTENDED RELEASE ORAL at 09:00

## 2024-05-30 RX ADMIN — TACROLIMUS 1 MG: 1 CAPSULE ORAL at 20:29

## 2024-05-30 RX ADMIN — METOPROLOL SUCCINATE 50 MG: 50 TABLET, EXTENDED RELEASE ORAL at 09:01

## 2024-05-30 RX ADMIN — IPRATROPIUM BROMIDE AND ALBUTEROL SULFATE 3 ML: .5; 3 SOLUTION RESPIRATORY (INHALATION) at 15:12

## 2024-05-30 RX ADMIN — METOCLOPRAMIDE 5 MG: 5 TABLET ORAL at 11:04

## 2024-05-30 RX ADMIN — IPRATROPIUM BROMIDE AND ALBUTEROL SULFATE 3 ML: .5; 3 SOLUTION RESPIRATORY (INHALATION) at 10:56

## 2024-05-30 RX ADMIN — DOCUSATE SODIUM 100 MG: 100 CAPSULE, LIQUID FILLED ORAL at 09:01

## 2024-05-30 RX ADMIN — EMPAGLIFLOZIN 25 MG: 25 TABLET, FILM COATED ORAL at 11:04

## 2024-05-30 RX ADMIN — IPRATROPIUM BROMIDE AND ALBUTEROL SULFATE 3 ML: .5; 3 SOLUTION RESPIRATORY (INHALATION) at 19:22

## 2024-05-30 RX ADMIN — DIAZEPAM 5 MG: 5 TABLET ORAL at 09:46

## 2024-05-30 RX ADMIN — GUAIFENESIN 1200 MG: 600 TABLET, EXTENDED RELEASE ORAL at 09:01

## 2024-05-30 RX ADMIN — APIXABAN 5 MG: 5 TABLET, FILM COATED ORAL at 09:00

## 2024-05-30 RX ADMIN — FERROUS SULFATE TAB EC 324 MG (65 MG FE EQUIVALENT) 324 MG: 324 (65 FE) TABLET DELAYED RESPONSE at 09:00

## 2024-05-30 RX ADMIN — CHOLESTYRAMINE 4 G: 4 POWDER, FOR SUSPENSION ORAL at 09:00

## 2024-05-30 RX ADMIN — Medication 10 ML: at 20:29

## 2024-05-30 RX ADMIN — Medication 10 ML: at 09:02

## 2024-05-30 RX ADMIN — DIAZEPAM 5 MG: 5 TABLET ORAL at 22:05

## 2024-05-30 RX ADMIN — LEVOTHYROXINE SODIUM 50 MCG: 0.05 TABLET ORAL at 06:17

## 2024-05-30 RX ADMIN — FUROSEMIDE 80 MG: 40 TABLET ORAL at 09:01

## 2024-05-30 RX ADMIN — METOCLOPRAMIDE 5 MG: 5 TABLET ORAL at 16:35

## 2024-05-30 RX ADMIN — METOLAZONE 5 MG: 5 TABLET ORAL at 09:01

## 2024-05-30 RX ADMIN — BUDESONIDE 0.5 MG: 0.5 INHALANT RESPIRATORY (INHALATION) at 19:27

## 2024-05-30 RX ADMIN — HYDROCODONE BITARTRATE AND ACETAMINOPHEN 1 TABLET: 5; 325 TABLET ORAL at 19:19

## 2024-05-30 RX ADMIN — DOCUSATE SODIUM 100 MG: 100 CAPSULE, LIQUID FILLED ORAL at 20:29

## 2024-05-30 RX ADMIN — DILTIAZEM HYDROCHLORIDE 90 MG: 60 TABLET, FILM COATED ORAL at 06:16

## 2024-05-30 NOTE — CASE MANAGEMENT/SOCIAL WORK
Continued Stay Note  DAHIANA Gay     Patient Name: Jaja Carcamo  MRN: [id number]  Today's Date: 5/30/2024    Admit Date: 5/24/2024    Plan: [facility] accepted, precert started 5/29 and pending. PASRR approved.   Discharge Plan       Row Name 05/30/24 1426       Plan    Plan [facility] accepted, precert started 5/29 and pending. PASRR approved.    Patient/Family in Agreement with Plan yes    Plan Comments PEDRO Long notified to start precert yesterday 5/29 and it remains pending today. Received update from Dr Lowe that pt is not yet ready for discharge today, possibly tomorrow. Notified [facility]' liaison Nataliia.             Megan Naegele, RN     Office Phone: [phone]  Office Cell: [phone]

## 2024-05-30 NOTE — PLAN OF CARE
Goal Outcome Evaluation:        Problem: Adult Inpatient Plan of Care  Goal: Plan of Care Review  Outcome: Ongoing, Progressing  Goal: Patient-Specific Goal (Individualized)  Outcome: Ongoing, Progressing  Goal: Absence of Hospital-Acquired Illness or Injury  Outcome: Ongoing, Progressing  Goal: Optimal Comfort and Wellbeing  Outcome: Ongoing, Progressing  Goal: Readiness for Transition of Care  Outcome: Ongoing, Progressing     Problem: Asthma Comorbidity  Goal: Maintenance of Asthma Control  Outcome: Ongoing, Progressing     Problem: Behavioral Health Comorbidity  Goal: Maintenance of Behavioral Health Symptom Control  Outcome: Ongoing, Progressing     Problem: COPD (Chronic Obstructive Pulmonary Disease) Comorbidity  Goal: Maintenance of COPD Symptom Control  Outcome: Ongoing, Progressing     Problem: Diabetes Comorbidity  Goal: Blood Glucose Level Within Targeted Range  Outcome: Ongoing, Progressing     Problem: Heart Failure Comorbidity  Goal: Maintenance of Heart Failure Symptom Control  Outcome: Ongoing, Progressing     Problem: Hypertension Comorbidity  Goal: Blood Pressure in Desired Range  Outcome: Ongoing, Progressing     Problem: Obstructive Sleep Apnea Risk or Actual Comorbidity Management  Goal: Unobstructed Breathing During Sleep  Outcome: Ongoing, Progressing     Problem: Osteoarthritis Comorbidity  Goal: Maintenance of Osteoarthritis Symptom Control  Outcome: Ongoing, Progressing     Problem: Pain Chronic (Persistent) (Comorbidity Management)  Goal: Acceptable Pain Control and Functional Ability  Outcome: Ongoing, Progressing     Problem: Seizure Disorder Comorbidity  Goal: Maintenance of Seizure Control  Outcome: Ongoing, Progressing     Problem: Skin Injury Risk Increased  Goal: Skin Health and Integrity  Outcome: Ongoing, Progressing     Problem: Fall Injury Risk  Goal: Absence of Fall and Fall-Related Injury  Outcome: Ongoing, Progressing  Goal: Absence of Fall and Fall-Related Injury  Outcome:  Ongoing, Progressing         Received pt at 2300. Pt was able to sleep during shift. No major complaints.

## 2024-05-30 NOTE — PLAN OF CARE
Problem: Adult Inpatient Plan of Care  Goal: Plan of Care Review  Outcome: Ongoing, Progressing  Goal: Patient-Specific Goal (Individualized)  Outcome: Ongoing, Progressing  Goal: Absence of Hospital-Acquired Illness or Injury  Outcome: Ongoing, Progressing  Intervention: Identify and Manage Fall Risk  Recent Flowsheet Documentation  Taken 5/30/2024 1000 by Smita Davis, RN  Safety Promotion/Fall Prevention: safety round/check completed  Taken 5/30/2024 0815 by Smita Davis, RN  Safety Promotion/Fall Prevention: safety round/check completed  Goal: Optimal Comfort and Wellbeing  Outcome: Ongoing, Progressing  Goal: Readiness for Transition of Care  Outcome: Ongoing, Progressing     Problem: Asthma Comorbidity  Goal: Maintenance of Asthma Control  Outcome: Ongoing, Progressing     Problem: Behavioral Health Comorbidity  Goal: Maintenance of Behavioral Health Symptom Control  Outcome: Ongoing, Progressing     Problem: COPD (Chronic Obstructive Pulmonary Disease) Comorbidity  Goal: Maintenance of COPD Symptom Control  Outcome: Ongoing, Progressing     Problem: Diabetes Comorbidity  Goal: Blood Glucose Level Within Targeted Range  Outcome: Ongoing, Progressing     Problem: Heart Failure Comorbidity  Goal: Maintenance of Heart Failure Symptom Control  Outcome: Ongoing, Progressing     Problem: Hypertension Comorbidity  Goal: Blood Pressure in Desired Range  Outcome: Ongoing, Progressing     Problem: Obstructive Sleep Apnea Risk or Actual Comorbidity Management  Goal: Unobstructed Breathing During Sleep  Outcome: Ongoing, Progressing     Problem: Osteoarthritis Comorbidity  Goal: Maintenance of Osteoarthritis Symptom Control  Outcome: Ongoing, Progressing     Problem: Pain Chronic (Persistent) (Comorbidity Management)  Goal: Acceptable Pain Control and Functional Ability  Outcome: Ongoing, Progressing     Problem: Seizure Disorder Comorbidity  Goal: Maintenance of Seizure Control  Outcome: Ongoing, Progressing      Problem: Skin Injury Risk Increased  Goal: Skin Health and Integrity  Outcome: Ongoing, Progressing  Intervention: Optimize Skin Protection  Recent Flowsheet Documentation  Taken 5/30/2024 0815 by Smita Davis RN  Pressure Reduction Devices: pressure-redistributing mattress utilized     Problem: Fall Injury Risk  Goal: Absence of Fall and Fall-Related Injury  Outcome: Ongoing, Progressing  Intervention: Promote Injury-Free Environment  Recent Flowsheet Documentation  Taken 5/30/2024 1000 by Smita Davis RN  Safety Promotion/Fall Prevention: safety round/check completed  Taken 5/30/2024 0815 by Smita Davis RN  Safety Promotion/Fall Prevention: safety round/check completed  Goal: Absence of Fall and Fall-Related Injury  Outcome: Ongoing, Progressing  Intervention: Promote Injury-Free Environment  Recent Flowsheet Documentation  Taken 5/30/2024 1000 by Smita Davis RN  Safety Promotion/Fall Prevention: safety round/check completed  Taken 5/30/2024 0815 by Smita Davis RN  Safety Promotion/Fall Prevention: safety round/check completed   Goal Outcome Evaluation:

## 2024-05-30 NOTE — SIGNIFICANT NOTE
Case Management/Social Work    Patient Name:  Jaja Carcamo  YOB: 1945  MRN: [id number]  Admit Date:  5/24/2024 05/30/24 1521   Post Acute Pre-Cert Documentation   Date Post Acute Pre-Cert Completed 05/30/24   All Clinicals Submitted? Yes   Response Received from Insurance? Approval   Post Acute Pre-Cert Initiated Comment PEDRO verified SNF precert approval via [facility] Plan auth ID [id number], portal auth ID [id number]. Valid 5/30-6/3. CM made aware.           Electronically signed by:  Ethan Camacho CMA  05/30/24 15:22 EDT    Ethan Camacho  Case Management Associate  [address] [address]  P: [phone]  F: [phone]

## 2024-05-30 NOTE — PROGRESS NOTES
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  Atrial fibrillation with RV  Volume overload  Hx fall  Hypothyroidism    Hyperlipidemia  History of kidney transplant  Anemia  CAD  History of DVT     Pulmonary hypertension  HTN  UTI        PLAN:     Patient has prolonged expiration and still feeling short of breath, I will add short course of dexamethasone orally  Nebulized bronchodilators  Nebulized corticosteroids  Continue Mucinex    oxygen supplement and titration to maintain saturation 90-95%: Currently requiring 4-6 L per nasal cannula  Encouraged use I-S flutter valve  Antibiotics: completed 5/28/2024    Diuresis and electrolyte management as per nephrology  Electrolytes/ glycemic control  BP/HR control  Thyroid hormone replacement  Chronic anticoagulation: Apixaban     I personally reviewed the radiological images               LOS: 6 days     Subjective     Patient reports  shortness of breath    Objective     Vital signs for last 24 hours:  Vitals:    05/30/24 0430 05/30/24 0658 05/30/24 0704 05/30/24 0744   BP: 112/72   129/54   BP Location: Left arm   Left arm   Patient Position: Lying   Lying   Pulse: 77 83 90 77   Resp: 18 18 21 27   Temp: 97.5 °F (36.4 °C)   97.9 °F (36.6 °C)   TempSrc: Oral   Oral   SpO2: 91% 91% 92% 92%   Weight:       Height:           Intake/Output last 3 shifts:  I/O last 3 completed shifts:  In: -   Out: 4100 [Urine:4100]  Intake/Output this shift:  I/O this shift:  In: -   Out: 400 [Urine:400]      Radiology  Imaging Results (Last 24 Hours)       ** No results found for the last 24 hours. **            Labs:  Results from last 7 days   Lab Units 05/30/24  0503   WBC 10*3/mm3 5.59   HEMOGLOBIN g/dL 9.0*   HEMATOCRIT % 30.7*   PLATELETS 10*3/mm3 149     Results from last 7 days   Lab Units 05/30/24  0503 05/25/24  0443 05/24/24  1210   SODIUM mmol/L 140   < > 137   POTASSIUM  mmol/L 3.8   < > 4.0   CHLORIDE mmol/L 99   < > 94*   CO2 mmol/L 33.6*   < > 32.9*   BUN mg/dL 15   < > 21   CREATININE mg/dL 0.75   < > 1.16*   CALCIUM mg/dL 9.8   < > 10.1   BILIRUBIN mg/dL  --   --  1.2   ALK PHOS U/L  --   --  59   ALT (SGPT) U/L  --   --  9   AST (SGOT) U/L  --   --  16   GLUCOSE mg/dL 91   < > 154*    < > = values in this interval not displayed.     Results from last 7 days   Lab Units 05/24/24  1225   PH, ARTERIAL pH units 7.442   PO2 ART mm Hg 56.7*   PCO2, ARTERIAL mm Hg 51.4*   HCO3 ART mmol/L 35.0*     Results from last 7 days   Lab Units 05/24/24  1210   ALBUMIN g/dL 3.6     Results from last 7 days   Lab Units 05/24/24  1408 05/24/24  1210   HSTROP T ng/L 35* 43*         Results from last 7 days   Lab Units 05/27/24  0526   MAGNESIUM mg/dL 1.9     Results from last 7 days   Lab Units 05/24/24  1210   INR  1.16*   APTT seconds 30.9*     Results from last 7 days   Lab Units 05/25/24  1110 05/24/24  1408   TSH uIU/mL 2.330  --    FREE T4 ng/dL  --  1.42           Meds:   SCHEDULE  apixaban, 5 mg, Oral, Q12H  cholestyramine light, 1 packet, Oral, Daily  dilTIAZem, 90 mg, Oral, Q8H  docusate sodium, 100 mg, Oral, BID  DULoxetine, 40 mg, Oral, Daily  empagliflozin, 25 mg, Oral, Daily  ferrous sulfate, 324 mg, Oral, Daily With Breakfast  furosemide, 80 mg, Oral, BID  guaiFENesin, 1,200 mg, Oral, BID  ipratropium-albuterol, 3 mL, Nebulization, 4x Daily - RT  levothyroxine, 50 mcg, Oral, Q AM  metoclopramide, 5 mg, Oral, 4x Daily AC & at Bedtime  [START ON 5/31/2024] metOLazone, 5 mg, Oral, Once per day on Monday Wednesday Friday  metoprolol succinate XL, 50 mg, Oral, Daily  polyethylene glycol, 17 g, Oral, Daily  potassium chloride, 40 mEq, Oral, Daily  predniSONE, 5 mg, Oral, Daily With Breakfast  sodium chloride, 10 mL, Intravenous, Q12H  tacrolimus, 1 mg, Oral, BID      Infusions     PRNs    acetaminophen    Calcium Replacement - Follow Nurse / BPA Driven Protocol    carboxymethylcellulose  sod    diazePAM    Diclofenac Sodium    hydrALAZINE    loperamide    Magnesium Standard Dose Replacement - Follow Nurse / BPA Driven Protocol    nitroglycerin    ondansetron ODT **OR** ondansetron    Phosphorus Replacement - Follow Nurse / BPA Driven Protocol    Potassium Replacement - Follow Nurse / BPA Driven Protocol    [COMPLETED] Insert Peripheral IV **AND** sodium chloride    sodium chloride    sodium chloride    Physical Exam:  General Appearance:  Alert   HEENT:  Normocephalic, without obvious abnormality, Conjunctiva/corneas clear,.   Nares normal, no drainage     Neck:  Supple, symmetrical, trachea midline.   Lungs /Chest wall:   Prolonged expiration with mild bilateral basal rhonchi, respirations unlabored, symmetrical wall movement.     Heart:  Regular rate and rhythm, S1 S2 normal  Abdomen: Soft, non-tender, no masses, no organomegaly.    Extremities: + Edema, no clubbing or cyanosis, right upper extremity fistula in place    ROS  Constitutional: Negative for chills, fever and malaise/fatigue.   HENT: Negative.    Eyes: Negative.    Cardiovascular: Negative.    Respiratory: Positive for  shortness of breath.    Skin: Negative.    Musculoskeletal: Negative.    Gastrointestinal: Negative.    Genitourinary: Negative.    Neurological: Generalized weakness      I reviewed the recent clinical results  I personally reviewed the latest radiological studies    Part of this note may be an electronic transcription/translation of spoken language to printed text using the Dragon Dictation System.

## 2024-05-30 NOTE — SIGNIFICANT NOTE
05/30/24 1133   Rehab Time/Intention   Session Not Performed patient/family declined treatment   Recommendation   OT - Next Appointment 05/31/24

## 2024-05-30 NOTE — THERAPY TREATMENT NOTE
"Subjective:  Pt was very upset as she could not find her light and she needed to go to the bathroom. This PTA heard pt calling out and entered pt's room.      Objective:     Bed mobility - Min-A supine to sit  Transfers - Min-A and with rolling walker sit to stand to bedside commode then same assist transferring to the chair.   Ambulation - 5 + 5 feet CGA and with rolling walker deferred distance gait due to pt highly anxious.    Vitals: Desaturates  sats drop to 87%    Pain: 0 VAS       Education: Provided education on the importance of mobility in the acute care setting, Verbal/Tactile Cues, and Transfer Training    Assessment: Jaja Carcamo presents with functional mobility impairments which indicate the need for skilled intervention. Tolerating session today without incident. Pt was highly anxious this morning and had difficulty calming herself even with encouragement from this PTA and nursing.  Pt very short of air with activity and sats did drop slightly.  Deferred gait due to pt becoming short of air with activity.  Will continue to follow and progress as tolerated.     Plan/Recommendations:   If medically appropriate, Moderate Intensity Therapy recommended post-acute care. This is recommended as therapy feels the patient would require 3-4 days per week and wouldn't tolerate \"3 hour daily\" rehab intensity. SNF would be the preferred choice. If the patient does not agree to SNF, arrange HH or OP depending on home bound status. If patient is medically complex, consider LTACH. Pt requires no DME at discharge.     Pt desires Skilled Rehab placement at discharge. Pt cooperative; agreeable to therapeutic recommendations and plan of care.     Basic Mobility 6-click:  Rollin = Total, A lot = 2, A little = 3; 4 = None  Supine>Sit:   1 = Total, A lot = 2, A little = 3; 4 = None   Sit>Stand with arms:  1 = Total, A lot = 2, A little = 3; 4 = None  Bed>Chair:   1 = Total, A lot = 2, A little = 3; 4 = " None  Ambulate in room:  1 = Total, A lot = 2, A little = 3; 4 = None  3-5 Steps with railin = Total, A lot = 2, A little = 3; 4 = None  Score: 16    Modified [place]: 4 = Moderately severe disability (Unable to attend to own bodily needs without assistance, and unable to walk unassisted)     Post-Tx Position: Up in Chair, Alarms activated, and Call light and personal items within reach  PPE: gloves and gown

## 2024-05-30 NOTE — PROGRESS NOTES
"RENAL/K[facility]:     LOS: 6 days    Patient Care Team:  Jonathan Luna APRN as PCP - General (Family Medicine)  Jak Gavin MD as Cardiologist (Cardiology)    Chief Complaint:  Fluid overload    Subjective     Interval History:   Chart reviewed  Good UOP but edema remains significant  Still c/o SOA      Objective     Vital Sign Min/Max for last 24 hours  Temp  Min: 97 °F (36.1 °C)  Max: 98.4 °F (36.9 °C)   BP  Min: 112/72  Max: 147/58   Pulse  Min: 67  Max: 94   Resp  Min: 14  Max: 27   SpO2  Min: 83 %  Max: 100 %   Flow (L/min)  Min: 4  Max: 4   No data recorded     Flowsheet Rows      Flowsheet Row First Filed Value   Admission Height 167.6 cm (66\") Documented at 05/24/2024 1143   Admission Weight 86.2 kg (190 lb) Documented at 05/24/2024 1143            I/O this shift:  In: -   Out: 400 [Urine:400]  I/O last 3 completed shifts:  In: -   Out: 4100 [Urine:4100]    Physical Exam:  GEN: Awake, NAD  ENT: PERRL, EOMI, MMM  NECK: Supple, no JVD  CHEST: CTAB, no W/R/C  CV: RRR, no M/G/R, +edema  ABD: Soft, NT, +BS  SKIN: Warm and Dry  NEURO: CN's intact      WBC WBC   Date Value Ref Range Status   05/30/2024 5.59 3.40 - 10.80 10*3/mm3 Final   05/29/2024 5.68 3.40 - 10.80 10*3/mm3 Final   05/28/2024 6.01 3.40 - 10.80 10*3/mm3 Final      HGB Hemoglobin   Date Value Ref Range Status   05/30/2024 9.0 (L) 12.0 - 15.9 g/dL Final   05/29/2024 8.8 (L) 12.0 - 15.9 g/dL Final   05/28/2024 9.4 (L) 12.0 - 15.9 g/dL Final      HCT Hematocrit   Date Value Ref Range Status   05/30/2024 30.7 (L) 34.0 - 46.6 % Final   05/29/2024 30.6 (L) 34.0 - 46.6 % Final   05/28/2024 33.7 (L) 34.0 - 46.6 % Final      Platlets No results found for: \"LABPLAT\"   MCV MCV   Date Value Ref Range Status   05/30/2024 100.3 (H) 79.0 - 97.0 fL Final   05/29/2024 101.0 (H) 79.0 - 97.0 fL Final   05/28/2024 101.8 (H) 79.0 - 97.0 fL Final          Sodium Sodium   Date Value Ref Range Status   05/30/2024 140 136 - 145 mmol/L Final   05/29/2024 141 136 - 145 mmol/L " "Final   05/28/2024 141 136 - 145 mmol/L Final      Potassium Potassium   Date Value Ref Range Status   05/30/2024 3.8 3.5 - 5.2 mmol/L Final   05/29/2024 4.3 3.5 - 5.2 mmol/L Final   05/28/2024 4.6 3.5 - 5.2 mmol/L Final      Chloride Chloride   Date Value Ref Range Status   05/30/2024 99 98 - 107 mmol/L Final   05/29/2024 101 98 - 107 mmol/L Final   05/28/2024 99 98 - 107 mmol/L Final      CO2 CO2   Date Value Ref Range Status   05/30/2024 33.6 (H) 22.0 - 29.0 mmol/L Final   05/29/2024 33.6 (H) 22.0 - 29.0 mmol/L Final   05/28/2024 35.9 (H) 22.0 - 29.0 mmol/L Final      BUN BUN   Date Value Ref Range Status   05/30/2024 15 8 - 23 mg/dL Final   05/29/2024 16 8 - 23 mg/dL Final   05/28/2024 19 8 - 23 mg/dL Final      Creatinine Creatinine   Date Value Ref Range Status   05/30/2024 0.75 0.57 - 1.00 mg/dL Final   05/29/2024 0.76 0.57 - 1.00 mg/dL Final   05/28/2024 0.84 0.57 - 1.00 mg/dL Final      Calcium Calcium   Date Value Ref Range Status   05/30/2024 9.8 8.6 - 10.5 mg/dL Final   05/29/2024 9.4 8.6 - 10.5 mg/dL Final   05/28/2024 9.9 8.6 - 10.5 mg/dL Final      PO4 No results found for: \"CAPO4\"   Albumin No results found for: \"ALBUMIN\"   Magnesium No results found for: \"MG\"     Uric Acid No results found for: \"URICACID\"        Results Review:     I reviewed the patient's new clinical results.    apixaban, 5 mg, Oral, Q12H  budesonide, 0.5 mg, Nebulization, BID - RT  cholestyramine light, 1 packet, Oral, Daily  dexAMETHasone, 4 mg, Oral, Daily With Breakfast  dilTIAZem, 90 mg, Oral, Q8H  docusate sodium, 100 mg, Oral, BID  DULoxetine, 40 mg, Oral, Daily  empagliflozin, 25 mg, Oral, Daily  ferrous sulfate, 324 mg, Oral, Daily With Breakfast  furosemide, 40 mg, Intravenous, Once  guaiFENesin, 1,200 mg, Oral, BID  ipratropium-albuterol, 3 mL, Nebulization, 4x Daily - RT  levothyroxine, 50 mcg, Oral, Q AM  metoclopramide, 5 mg, Oral, 4x Daily AC & at Bedtime  [START ON 5/31/2024] metOLazone, 5 mg, Oral, Once per day on " Monday Wednesday Friday  metoprolol succinate XL, 50 mg, Oral, Daily  polyethylene glycol, 17 g, Oral, Daily  potassium chloride, 40 mEq, Oral, Daily  predniSONE, 5 mg, Oral, Daily With Breakfast  sodium chloride, 10 mL, Intravenous, Q12H  tacrolimus, 1 mg, Oral, BID      furosemide, 40 mg/hr        Medication Review: Reviewed    Assessment & Plan     TONYA    ESRD - s/p DDKT     Chronic immunoRx     Fluid overload/CHF exacerbation     Atrial fibrillation     History of COPD     History of lung cancer     Hypokalemia     Plan:  Cr 0.7  K WNL  Still with significant LE edema  Will transition to Lasix gtt for the next 12 hours  Metolazone 5 mg PO x 1 today  On fluid restriction  OK with Jardiance from a renal standpoint  Will follow      Naun Falcon MD  [facility]  05/30/24  13:56 EDT

## 2024-05-30 NOTE — PLAN OF CARE
"Goal Outcome Evaluation:      Bed mobility - Min-A supine to sit  Transfers - Min-A and with rolling walker sit to stand to bedside commode then same assist transferring to the chair.   Ambulation - 5 + 5 feet CGA and with rolling walker deferred distance gait due to pt highly anxious.    Vitals: Desaturates  sats drop to 87%    If medically appropriate, Moderate Intensity Therapy recommended post-acute care. This is recommended as therapy feels the patient would require 3-4 days per week and wouldn't tolerate \"3 hour daily\" rehab intensity. SNF would be the preferred choice. If the patient does not agree to SNF, arrange HH or OP depending on home bound status. If patient is medically complex, consider LTACH. Pt requires no DME at discharge.     Pt desires Skilled Rehab placement at discharge. Pt cooperative; agreeable to therapeutic recommendations and plan of care.                                         "

## 2024-05-30 NOTE — PROGRESS NOTES
Referring Provider: Juanis Lowe MD    Reason for follow-up: Shortness of breath     Patient Care Team:  Jonathan Luna APRN as PCP - General (Family Medicine)  Jak Gavin MD as Cardiologist (Cardiology)      SUBJECTIVE  Laying in bed but complains of shortness of breath.  She reports some improvement compared to yesterday.     ROS  Review of all systems negative except as indicated.    Since I have last seen, the patient has been without any chest discomfort, shortness of breath, palpitations, dizziness or syncope.  Denies having any headache, abdominal pain, nausea, vomiting, diarrhea, constipation, loss of weight or loss of appetite.  Denies having any excessive bruising, hematuria or blood in the stool.  ROS      Personal History:    Past Medical History:   Diagnosis Date    Allergic rhinitis 04/14/2015    Asthma 08/10/2017    Atrial flutter 05/11/2017    Chronic diarrhea 04/15/2019    Chronic hypoxemic respiratory failure 01/18/2024    Chronic pain 02/03/2015    COPD (chronic obstructive pulmonary disease)     COVID-19 virus detected 08/11/2022    Cytokine release syndrome, grade 1 08/16/2022    Edema of both lower extremities due to peripheral venous insufficiency 03/12/2021    ESRD on hemodialysis 05/22/2013    Essential (primary) hypertension 03/03/2022    Fracture of ulnar styloid 05/19/2022    Fracture of unspecified carpal bone, right wrist, subsequent encounter for fracture with routine healing 03/03/2022    Gastroesophageal reflux disease 11/12/2020    Gout 08/10/2017    Hearing loss 08/10/2017    History of appendectomy     History of DVT (deep vein thrombosis) 11/12/2020    History of kidney transplant 06/30/2017    History of lobectomy of lung 01/18/2024 03/08/2016:  RIGHT Lower Lobe Mass--> Right Video-assisted thoracoscopy with a moderate-to-large wedge resection of the RLL (by Dr. Haris Mcconnell @ [facility] [place])--> Poorly differentiated carcinoma of the RLL.      History of repair of hip  joint 05/21/2013    History of suicide attempt 05/20/2024    Hyperlipidemia 08/11/2014    Hypothyroidism, unspecified 03/03/2022    Infection due to extended spectrum beta lactamase (ESBL) producing bacteria 03/11/2016    No A2K system hx. +ESBL E coli urine on 3/11/16.      Irritable bowel syndrome 04/15/2019    Long term (current) use of immunosuppressive biologic 04/28/2021    Long term current use of anticoagulant therapy 01/18/2024    Malignant neoplasm of lung 08/10/2017    Menopausal flushing 08/30/2021    Mitral valve regurgitation 07/06/2015    Mixed anxiety and depressive disorder 04/30/2015    Non-small cell lung cancer 08/10/2017    Nonobstructive atherosclerosis of coronary artery 06/02/2019 08/12/2022: CATH: [facility]: NSTEMI assoc with Covid-19: LM:-nl;  LAD: diffuse, Ca++; 30%; CIRC: Dominant. Normal; RCA: small; 50% diffuse, proximal and mid-segment.      NSTEMI (non-ST elevated myocardial infarction) 08/11/2022    Obsessive-compulsive disorder 04/15/2019    Osteoarthritis 05/20/2024    Paroxysmal atrial fibrillation 05/11/2017    Paroxysmal supraventricular tachycardia 05/11/2017    Peripheral neuropathy 08/11/2014    Personal history of peptic ulcer disease 11/12/2020    Postoperative anemia due to acute blood loss 05/22/2013    Right wrist fracture 03/01/2022    Seasonal allergies 08/10/2017    Secondary hyperparathyroidism of renal origin 09/14/2015    Tricuspid valve regurgitation 03/15/2017    Ulcer of lower extremity 08/30/2021    Unspecified acute appendicitis 03/03/2022    Valvular heart disease 05/20/2024    Wegener's granulomatosis with renal involvement 03/03/2022       Past Surgical History:   Procedure Laterality Date    APPENDECTOMY      BREAST SURGERY Left     cysts rmeoved    CARDIAC CATHETERIZATION Right 08/12/2022    Procedure: Left Heart Cath and coronary angiogram;  Surgeon: Jak Gavin MD;  Location: [facility] INVASIVE LOCATION;  Service: Cardiology;  Laterality:  Right;    CLOSED REDUCTION WRIST FRACTURE Right 03/01/2022    Procedure: WRIST CLOSED REDUCTION;  Surgeon: Gaudencio Townsend MD;  Location: [facility] MAIN OR;  Service: Orthopedics;  Laterality: Right;    CYSTOSCOPY      HIP ARTHROPLASTY      HYSTERECTOMY      LUNG LOBECTOMY Right     TRANSPLANTATION RENAL         Family History   Problem Relation Age of Onset    No Known Problems Mother     No Known Problems Father        Social History     Tobacco Use    Smoking status: Former    Smokeless tobacco: Never   Vaping Use    Vaping status: Former   Substance Use Topics    Alcohol use: Never    Drug use: Never        Home meds:  Prior to Admission medications    Medication Sig Start Date End Date Taking? Authorizing Provider   acetaminophen (Tylenol) 325 MG tablet Take 2 tablets by mouth Every 4 (Four) Hours As Needed for Fever or Mild Pain. 3/13/20   Court Vences MD   albuterol sulfate  (90 Base) MCG/ACT inhaler Inhale 2 puffs Every 6 (Six) Hours As Needed for Wheezing.    Court Vences MD   apixaban (ELIQUIS) 5 MG tablet tablet Take 1 tablet by mouth Every 12 (Twelve) Hours. 8/16/22   Clyde White,    Carboxymethylcellulose Sodium (DRY EYE RELIEF OP) Apply 2 drops to eye(s) as directed by provider 2 (Two) Times a Day As Needed (dry eyes). 5/11/22   Court Vences MD   colestipol (COLESTID) 1 g tablet Take 1 tablet by mouth Daily.    Court Vences MD   diazePAM (VALIUM) 5 MG tablet Take 5 mg by mouth 2 (Two) Times a Day As Needed for Anxiety.    Court Vences MD   Diclofenac Sodium (Aspercreme Arthritis Pain) 1 % gel gel Apply 4 g topically to the appropriate area as directed 2 (Two) Times a Day As Needed (pain). 10/21/20   Court Vences MD   dilTIAZem (CARDIZEM) 90 MG tablet Take 1 tablet by mouth Every 8 (Eight) Hours. 5/22/24   Mariangel Stearns APRN   DULoxetine HCl 40 MG capsule delayed-release particles Take 1 capsule by mouth Daily.    Provider  MD Court   ferrous sulfate 324 (65 Fe) MG tablet delayed-release EC tablet Take 1 tablet by mouth Daily With Breakfast. 5/23/24   Mariangel Stearns APRN   furosemide (LASIX) 80 MG tablet Take 1 tablet by mouth 2 (Two) Times a Day. 5/22/24   Mariangel Stearns APRN   guaiFENesin (Mucinex) 600 MG 12 hr tablet Take 1,200 mg by mouth 2 (Two) Times a Day. 8/16/22   Court Vences MD   levothyroxine (SYNTHROID, LEVOTHROID) 50 MCG tablet Take 1 tablet by mouth Daily.    Court Vences MD   loperamide (IMODIUM) 2 MG capsule Take 2 mg by mouth 4 (Four) Times a Day As Needed for Diarrhea. 3/13/20   Court Vences MD   metoprolol succinate XL (TOPROL-XL) 50 MG 24 hr tablet Take 50 mg by mouth Daily.    Court Vences MD   Xyxdf-Gtaci-Rxovrkn-Pramoxine (Neosporin + Pain Relief Max St) 1 % ointment Apply 1 application topically to the appropriate area as directed 2 (Two) Times a Day As Needed (sores). 7/2/20   Court Vences MD   potassium chloride (KLOR-CON M20) 20 MEQ CR tablet Take 2 tablets by mouth Daily. 5/22/24   Mariangel Stearns APRN   predniSONE (DELTASONE) 5 MG tablet Take 5 mg by mouth Daily.    Court Vences MD   Salicylic Acid-Urea (KERASAL EX) Apply 1 application topically to the appropriate area as directed 2 (Two) Times a Day As Needed (dry feet). 11/23/19   Court Vences MD   tacrolimus (PROGRAF) 1 MG capsule Take 1 mg by mouth 2 (Two) Times a Day.    Court Vences MD       Allergies:  Zolpidem    Scheduled Meds:apixaban, 5 mg, Oral, Q12H  cholestyramine light, 1 packet, Oral, Daily  dilTIAZem, 90 mg, Oral, Q8H  docusate sodium, 100 mg, Oral, BID  DULoxetine, 40 mg, Oral, Daily  ferrous sulfate, 324 mg, Oral, Daily With Breakfast  furosemide, 80 mg, Oral, BID  guaiFENesin, 1,200 mg, Oral, BID  ipratropium-albuterol, 3 mL, Nebulization, 4x Daily - RT  levothyroxine, 50 mcg, Oral, Q AM  metoclopramide, 5 mg, Oral, 4x Daily AC & at  "Bedtime  metOLazone, 5 mg, Oral, Once  [START ON 5/31/2024] metOLazone, 5 mg, Oral, Once per day on Monday Wednesday Friday  metoprolol succinate XL, 50 mg, Oral, Daily  polyethylene glycol, 17 g, Oral, Daily  potassium chloride, 40 mEq, Oral, Daily  predniSONE, 5 mg, Oral, Daily With Breakfast  sodium chloride, 10 mL, Intravenous, Q12H  tacrolimus, 1 mg, Oral, BID      Continuous Infusions:   PRN Meds:.  acetaminophen    Calcium Replacement - Follow Nurse / BPA Driven Protocol    carboxymethylcellulose sod    diazePAM    Diclofenac Sodium    hydrALAZINE    loperamide    Magnesium Standard Dose Replacement - Follow Nurse / BPA Driven Protocol    nitroglycerin    ondansetron ODT **OR** ondansetron    Phosphorus Replacement - Follow Nurse / BPA Driven Protocol    Potassium Replacement - Follow Nurse / BPA Driven Protocol    [COMPLETED] Insert Peripheral IV **AND** sodium chloride    sodium chloride    sodium chloride      OBJECTIVE    Vital Signs  Vitals:    05/29/24 2046 05/30/24 0006 05/30/24 0430 05/30/24 0658   BP: 139/58 117/51 112/72    BP Location: Left arm Left arm Left arm    Patient Position: Lying Lying Lying    Pulse: 78 71 77    Resp: 15 22 18    Temp: 97 °F (36.1 °C) 97.7 °F (36.5 °C) 97.5 °F (36.4 °C)    TempSrc: Oral Oral Oral    SpO2: 91% 90% 91% (!) 88%   Weight:       Height:           Flowsheet Rows      Flowsheet Row First Filed Value   Admission Height 167.6 cm (66\") Documented at 05/24/2024 1143   Admission Weight 86.2 kg (190 lb) Documented at 05/24/2024 1143              Intake/Output Summary (Last 24 hours) at 5/30/2024 0659  Last data filed at 5/30/2024 0430  Gross per 24 hour   Intake --   Output 2850 ml   Net -2850 ml          Telemetry: Atrial fibrillation with heart rate in the 60s and 70s    Physical Exam:  The patient is alert, oriented and in no distress.  Obese  Vital signs as noted above.  Head and neck revealed no carotid bruits or jugular venous distention.  No thyromegaly or " lymphadenopathy is present  Lungs clear.  No wheezing.  Breath sounds are normal bilaterally.  Heart normal first and second heart sounds.  No murmur. No precordial rub is present.  No gallop is present.  Abdomen soft and nontender.  No organomegaly is present.  Extremities with good peripheral pulses without any pedal edema.  Skin warm and dry.  Musculoskeletal system is grossly normal.  CNS grossly normal.       Results Review:  I have personally reviewed the results from the time of this admission to 5/30/2024 06:59 EDT and agree with these findings:  []  Laboratory  []  Microbiology  []  Radiology  []  EKG/Telemetry   []  Cardiology/Vascular   []  Pathology  []  Old records  []  Other:    Most notable findings include:    Lab Results (last 24 hours)       Procedure Component Value Units Date/Time    Basic Metabolic Panel [[id number]]  (Abnormal) Collected: 05/30/24 0503    Specimen: Blood Updated: 05/30/24 0644     Glucose 91 mg/dL      BUN 15 mg/dL      Creatinine 0.75 mg/dL      Sodium 140 mmol/L      Potassium 3.8 mmol/L      Chloride 99 mmol/L      CO2 33.6 mmol/L      Calcium 9.8 mg/dL      BUN/Creatinine Ratio 20.0     Anion Gap 7.4 mmol/L      eGFR 82.1 mL/min/1.73     Narrative:      GFR Normal >60  Chronic Kidney Disease <60  Kidney Failure <15    The GFR formula is only valid for adults with stable renal function between ages 18 and 70.    CBC & Differential [[id number]]  (Abnormal) Collected: 05/30/24 0503    Specimen: Blood Updated: 05/30/24 0621    Narrative:      The following orders were created for panel order CBC & Differential.  Procedure                               Abnormality         Status                     ---------                               -----------         ------                     CBC Auto Differential[[id number]]        Abnormal            Final result                 Please view results for these tests on the individual orders.    CBC Auto Differential [[id number]]  (Abnormal)  Collected: 05/30/24 0503    Specimen: Blood Updated: 05/30/24 0621     WBC 5.59 10*3/mm3      RBC 3.06 10*6/mm3      Hemoglobin 9.0 g/dL      Hematocrit 30.7 %      .3 fL      MCH 29.4 pg      MCHC 29.3 g/dL      RDW 17.3 %      RDW-SD 62.4 fl      MPV 9.4 fL      Platelets 149 10*3/mm3      Neutrophil % 76.2 %      Lymphocyte % 9.5 %      Monocyte % 10.7 %      Eosinophil % 2.7 %      Basophil % 0.5 %      Immature Grans % 0.4 %      Neutrophils, Absolute 4.26 10*3/mm3      Lymphocytes, Absolute 0.53 10*3/mm3      Monocytes, Absolute 0.60 10*3/mm3      Eosinophils, Absolute 0.15 10*3/mm3      Basophils, Absolute 0.03 10*3/mm3      Immature Grans, Absolute 0.02 10*3/mm3      nRBC 0.0 /100 WBC     Blood Culture - Blood, Arm, Left [[id number]]  (Normal) Collected: 05/24/24 1311    Specimen: Blood from Arm, Left Updated: 05/29/24 1315     Blood Culture No growth at 5 days    Blood Culture - Blood, Arm, Left [[id number]]  (Normal) Collected: 05/24/24 1251    Specimen: Blood from Arm, Left Updated: 05/29/24 1301     Blood Culture No growth at 5 days    Tacrolimus Level [[id number]] Collected: 05/25/24 0443    Specimen: Blood from Arm, Left Updated: 05/29/24 1212     Tacrolimus 10.6 ng/mL      Comment:         Trough (immediately following                  transplant)                       15.0          Trough (steady state, 2 weeks or                  more after transplant):      3.0 - 8.0          Performed by LC-MS/MS technology.       Narrative:      Test(s) 700964-Tacrolimus (), Blood  was developed and its performance characteristics determined  by [employer]. It has not been cleared or approved by the Food  and Drug Administration.  Performed at:  [address]  [id number]  : Star Flores MD, Phone:  [phone]    Tacrolimus Level [[id number]] Collected: 05/25/24 1110    Specimen: Blood from Arm, Left Updated: 05/29/24 1112     Tacrolimus 10.3 ng/mL       Comment:         Trough (immediately following                  transplant)                       15.0          Trough (steady state, 2 weeks or                  more after transplant):      3.0 - 8.0          Performed by LC-MS/MS technology.       Narrative:      Test(s) 700964-Tacrolimus (), Blood  was developed and its performance characteristics determined  by [employer]. It has not been cleared or approved by the Food  and Drug Administration.  Performed at:  [address]  [id number]  : Star Flores MD, Phone:  [phone]            Imaging Results (Last 24 Hours)       ** No results found for the last 24 hours. **            LAB RESULTS (LAST 7 DAYS)    CBC  Results from last 7 days   Lab Units 05/30/24  0503 05/29/24  0446 05/28/24  0450 05/27/24  0526 05/26/24  0404 05/25/24  0443 05/24/24  1210   WBC 10*3/mm3 5.59 5.68 6.01 6.34 5.25 5.75 7.75   RBC 10*6/mm3 3.06* 3.03* 3.31* 3.55* 3.11* 3.39* 3.29*   HEMOGLOBIN g/dL 9.0* 8.8* 9.4* 10.2* 8.9* 9.6* 9.6*   HEMATOCRIT % 30.7* 30.6* 33.7* 36.0 31.0* 33.8* 32.7*   MCV fL 100.3* 101.0* 101.8* 101.4* 99.7* 99.7* 99.4*   PLATELETS 10*3/mm3 149 152 161 168 143 143 135*       BMP  Results from last 7 days   Lab Units 05/30/24  0503 05/29/24  0446 05/28/24  0450 05/27/24  0526 05/26/24  0404 05/25/24  0443 05/24/24  1408 05/24/24  1210   SODIUM mmol/L 140 141 141 140 142 141  --  137   POTASSIUM mmol/L 3.8 4.3 4.6 4.3 4.2 4.0  --  4.0   CHLORIDE mmol/L 99 101 99 97* 98 97*  --  94*   CO2 mmol/L 33.6* 33.6* 35.9* 34.6* 36.3* 36.0*  --  32.9*   BUN mg/dL 15 16 19 20 25* 24*  --  21   CREATININE mg/dL 0.75 0.76 0.84 0.89 1.10* 1.16*  --  1.16*   GLUCOSE mg/dL 91 94 98 105* 91 104*  --  154*   MAGNESIUM mg/dL  --   --   --  1.9 1.8 2.1 1.8  --    PHOSPHORUS mg/dL  --   --   --   --   --  4.3  --   --        CMP   Results from last 7 days   Lab Units 05/30/24  0503 05/29/24  0446 05/28/24  0450 05/27/24  0526  05/26/24  0404 05/25/24  0443 05/24/24  1210   SODIUM mmol/L 140 141 141 140 142 141 137   POTASSIUM mmol/L 3.8 4.3 4.6 4.3 4.2 4.0 4.0   CHLORIDE mmol/L 99 101 99 97* 98 97* 94*   CO2 mmol/L 33.6* 33.6* 35.9* 34.6* 36.3* 36.0* 32.9*   BUN mg/dL 15 16 19 20 25* 24* 21   CREATININE mg/dL 0.75 0.76 0.84 0.89 1.10* 1.16* 1.16*   GLUCOSE mg/dL 91 94 98 105* 91 104* 154*   ALBUMIN g/dL  --   --   --   --   --   --  3.6   BILIRUBIN mg/dL  --   --   --   --   --   --  1.2   ALK PHOS U/L  --   --   --   --   --   --  59   AST (SGOT) U/L  --   --   --   --   --   --  16   ALT (SGPT) U/L  --   --   --   --   --   --  9       BNP        TROPONIN  Results from last 7 days   Lab Units 05/24/24  1408   HSTROP T ng/L 35*       CoAg  Results from last 7 days   Lab Units 05/24/24  1210   INR  1.16*   APTT seconds 30.9*       Creatinine Clearance  Estimated Creatinine Clearance: 68.9 mL/min (by C-G formula based on SCr of 0.75 mg/dL).    ABG  Results from last 7 days   Lab Units 05/24/24  1225   PH, ARTERIAL pH units 7.442   PCO2, ARTERIAL mm Hg 51.4*   PO2 ART mm Hg 56.7*   O2 SATURATION ART % 89.5*   BASE EXCESS ART mmol/L 9.6*       Radiology  No radiology results for the last day      EKG  I personally viewed and interpreted the patient's EKG/Telemetry data:  ECG 12 Lead Dyspnea   Final Result   HEART RATE= 82  bpm   RR Interval= 732  ms   NE Interval=   ms   P Horizontal Axis=   deg   P Front Axis=   deg   QRSD Interval= 88  ms   QT Interval= 407  ms   QTcB= 476  ms   QRS Axis= -32  deg   T Wave Axis= 49  deg   - ABNORMAL ECG -   Atrial fibrillation   Left axis deviation   When compared with ECG of 20-May-2024 9:37:54,   Significant rate increase   Significant repolarization change   Electronically Signed By: Praneeth Theodore (Derek) 25-May-2024 07:53:06   Date and Time of Study: 2024-05-24 11:36:06      Telemetry Scan   Final Result      Telemetry Scan   Final Result      Telemetry Scan   Final Result      Telemetry Scan   Final  Result      Telemetry Scan   Final Result      Telemetry Scan   Final Result      Telemetry Scan   Final Result      Telemetry Scan   Final Result      Telemetry Scan   Final Result      Telemetry Scan   Final Result      Telemetry Scan   Final Result      Telemetry Scan   Final Result      Telemetry Scan   Final Result      Telemetry Scan   Final Result      Telemetry Scan   Final Result            Echocardiogram:    Results for orders placed during the hospital encounter of 05/17/24    Adult Transthoracic Echo Complete W/ Cont if Necessary Per Protocol    Interpretation Summary    Left ventricular systolic function is normal. Calculated left ventricular EF = 55% Left ventricular ejection fraction appears to be 51 - 55%.    Left ventricular diastolic function was indeterminate.    Left atrial volume is moderately increased.    The right atrial cavity is dilated.    Severe tricuspid valve regurgitation is present.    Estimated right ventricular systolic pressure from tricuspid regurgitation is markedly elevated (>55 mmHg).    Mild to moderate mitral valve regurgitation is present        Stress Test:         Cardiac Catheterization:  Results for orders placed during the hospital encounter of 08/11/22    Cardiac Catheterization/Vascular Study    Conclusion  IMPRESSIONS  Non obstructive CAD         Other:         ASSESSMENT & PLAN:    Principal Problem:    Acute exacerbation of CHF (congestive heart failure)      Atrial fibrillation  [id number] score is 5  Continue Eliquis for stroke prevention  Heart rate is well-controlled on Toprol-XL and Cardizem.  Unable to take Cardizem CD due to interaction with immunosuppressants.  TSH is normal     HFpEF/Pulmonary hypertension  Presented with proBNP of 4000 (compared to 8000 during previous admission)  She has chronic HFpEF and this was not an exacerbation.  Patient is euvolemic and now on oral diuretics  Monitor I's and O's and replace electrolytes as needed  Continue  Toprol-XL  Okay to start Jardiance if nephrology agrees.  Creatinine is normal  Echocardiogram shows preserved LV function with severe tricuspid valve regurgitation and severe pulmonary hypertension.     UTI  On antibiotics     History of kidney transplant  On prednisone and Prograf  Creatinine 0.75, GFR is 82.1  Diuretics with high doses of Lasix per nephrology  Metolazone as needed per nephrology  Bicarb is 34     COPD  History of lung cancer  CT shows minimal interval enlargement of the tissue surrounding the postsurgical changes associated with small bilateral pleural effusions.  Continue bronchodilators.  She should follow-up with an oncologist outpatient.     Obesity  Lifestyle modifications recommended to the patient.  She weighs 186 pounds.  BMI is 30.14    Jak Gavin MD  05/30/24  06:59 EDT

## 2024-05-31 ENCOUNTER — APPOINTMENT (OUTPATIENT)
Dept: GENERAL RADIOLOGY | Facility: HOSPITAL | Age: 79
DRG: 291 | End: 2024-05-31
Payer: MEDICARE

## 2024-05-31 LAB
ANION GAP SERPL CALCULATED.3IONS-SCNC: 12.1 MMOL/L (ref 5–15)
BASOPHILS # BLD AUTO: 0.01 10*3/MM3 (ref 0–0.2)
BASOPHILS NFR BLD AUTO: 0.1 % (ref 0–1.5)
BUN SERPL-MCNC: 17 MG/DL (ref 8–23)
BUN/CREAT SERPL: 17.2 (ref 7–25)
CALCIUM SPEC-SCNC: 10.7 MG/DL (ref 8.6–10.5)
CHLORIDE SERPL-SCNC: 91 MMOL/L (ref 98–107)
CO2 SERPL-SCNC: 37.9 MMOL/L (ref 22–29)
CREAT SERPL-MCNC: 0.99 MG/DL (ref 0.57–1)
DEPRECATED RDW RBC AUTO: 61.8 FL (ref 37–54)
EGFRCR SERPLBLD CKD-EPI 2021: 58.8 ML/MIN/1.73
EOSINOPHIL # BLD AUTO: 0 10*3/MM3 (ref 0–0.4)
EOSINOPHIL NFR BLD AUTO: 0 % (ref 0.3–6.2)
ERYTHROCYTE [DISTWIDTH] IN BLOOD BY AUTOMATED COUNT: 17.4 % (ref 12.3–15.4)
GLUCOSE SERPL-MCNC: 130 MG/DL (ref 65–99)
HCT VFR BLD AUTO: 36 % (ref 34–46.6)
HGB BLD-MCNC: 10.5 G/DL (ref 12–15.9)
IMM GRANULOCYTES # BLD AUTO: 0.04 10*3/MM3 (ref 0–0.05)
IMM GRANULOCYTES NFR BLD AUTO: 0.6 % (ref 0–0.5)
LYMPHOCYTES # BLD AUTO: 0.51 10*3/MM3 (ref 0.7–3.1)
LYMPHOCYTES NFR BLD AUTO: 7.1 % (ref 19.6–45.3)
MCH RBC QN AUTO: 28.4 PG (ref 26.6–33)
MCHC RBC AUTO-ENTMCNC: 29.2 G/DL (ref 31.5–35.7)
MCV RBC AUTO: 97.3 FL (ref 79–97)
MONOCYTES # BLD AUTO: 0.56 10*3/MM3 (ref 0.1–0.9)
MONOCYTES NFR BLD AUTO: 7.8 % (ref 5–12)
NEUTROPHILS NFR BLD AUTO: 6.09 10*3/MM3 (ref 1.7–7)
NEUTROPHILS NFR BLD AUTO: 84.4 % (ref 42.7–76)
NRBC BLD AUTO-RTO: 0 /100 WBC (ref 0–0.2)
PLATELET # BLD AUTO: 179 10*3/MM3 (ref 140–450)
PMV BLD AUTO: 8.8 FL (ref 6–12)
POTASSIUM SERPL-SCNC: 3.5 MMOL/L (ref 3.5–5.2)
POTASSIUM SERPL-SCNC: 3.6 MMOL/L (ref 3.5–5.2)
RBC # BLD AUTO: 3.7 10*6/MM3 (ref 3.77–5.28)
SODIUM SERPL-SCNC: 141 MMOL/L (ref 136–145)
WBC NRBC COR # BLD AUTO: 7.21 10*3/MM3 (ref 3.4–10.8)

## 2024-05-31 PROCEDURE — 94799 UNLISTED PULMONARY SVC/PX: CPT

## 2024-05-31 PROCEDURE — 97530 THERAPEUTIC ACTIVITIES: CPT

## 2024-05-31 PROCEDURE — 99233 SBSQ HOSP IP/OBS HIGH 50: CPT | Performed by: INTERNAL MEDICINE

## 2024-05-31 PROCEDURE — 94664 DEMO&/EVAL PT USE INHALER: CPT

## 2024-05-31 PROCEDURE — 63710000001 PREDNISONE PER 5 MG: Performed by: INTERNAL MEDICINE

## 2024-05-31 PROCEDURE — 63710000001 DEXAMETHASONE PER 0.25 MG: Performed by: INTERNAL MEDICINE

## 2024-05-31 PROCEDURE — 97110 THERAPEUTIC EXERCISES: CPT

## 2024-05-31 PROCEDURE — 63710000001 TACROLIMUS PER 1 MG: Performed by: INTERNAL MEDICINE

## 2024-05-31 PROCEDURE — 25010000002 ACETAZOLAMIDE PER 500 MG: Performed by: INTERNAL MEDICINE

## 2024-05-31 PROCEDURE — 80048 BASIC METABOLIC PNL TOTAL CA: CPT | Performed by: INTERNAL MEDICINE

## 2024-05-31 PROCEDURE — 25010000002 FUROSEMIDE PER 20 MG: Performed by: INTERNAL MEDICINE

## 2024-05-31 PROCEDURE — 71045 X-RAY EXAM CHEST 1 VIEW: CPT

## 2024-05-31 PROCEDURE — 97116 GAIT TRAINING THERAPY: CPT

## 2024-05-31 PROCEDURE — 85025 COMPLETE CBC W/AUTO DIFF WBC: CPT | Performed by: INTERNAL MEDICINE

## 2024-05-31 PROCEDURE — 97535 SELF CARE MNGMENT TRAINING: CPT

## 2024-05-31 PROCEDURE — 94761 N-INVAS EAR/PLS OXIMETRY MLT: CPT

## 2024-05-31 PROCEDURE — 84132 ASSAY OF SERUM POTASSIUM: CPT | Performed by: INTERNAL MEDICINE

## 2024-05-31 RX ORDER — FUROSEMIDE 40 MG/1
80 TABLET ORAL
Status: DISCONTINUED | OUTPATIENT
Start: 2024-05-31 | End: 2024-06-01

## 2024-05-31 RX ORDER — POTASSIUM CHLORIDE 1.5 G/1.58G
40 POWDER, FOR SOLUTION ORAL EVERY 4 HOURS
Status: DISPENSED | OUTPATIENT
Start: 2024-05-31 | End: 2024-05-31

## 2024-05-31 RX ORDER — ACETAZOLAMIDE SODIUM 500 MG/5ML
500 INJECTION, POWDER, LYOPHILIZED, FOR SOLUTION INTRAVENOUS ONCE
Status: COMPLETED | OUTPATIENT
Start: 2024-05-31 | End: 2024-05-31

## 2024-05-31 RX ADMIN — PREDNISONE 5 MG: 5 TABLET ORAL at 08:56

## 2024-05-31 RX ADMIN — APIXABAN 5 MG: 5 TABLET, FILM COATED ORAL at 08:57

## 2024-05-31 RX ADMIN — HYDROCODONE BITARTRATE AND ACETAMINOPHEN 1 TABLET: 5; 325 TABLET ORAL at 22:44

## 2024-05-31 RX ADMIN — Medication 10 ML: at 20:40

## 2024-05-31 RX ADMIN — ACETAZOLAMIDE 500 MG: 500 INJECTION, POWDER, LYOPHILIZED, FOR SOLUTION INTRAVENOUS at 15:32

## 2024-05-31 RX ADMIN — METOCLOPRAMIDE 5 MG: 5 TABLET ORAL at 17:48

## 2024-05-31 RX ADMIN — METOCLOPRAMIDE 5 MG: 5 TABLET ORAL at 20:40

## 2024-05-31 RX ADMIN — IPRATROPIUM BROMIDE AND ALBUTEROL SULFATE 3 ML: .5; 3 SOLUTION RESPIRATORY (INHALATION) at 15:15

## 2024-05-31 RX ADMIN — DIAZEPAM 5 MG: 5 TABLET ORAL at 22:44

## 2024-05-31 RX ADMIN — FUROSEMIDE 40 MG/HR: 10 INJECTION, SOLUTION INTRAMUSCULAR; INTRAVENOUS at 03:57

## 2024-05-31 RX ADMIN — TACROLIMUS 1 MG: 1 CAPSULE ORAL at 08:59

## 2024-05-31 RX ADMIN — METOCLOPRAMIDE 5 MG: 5 TABLET ORAL at 08:56

## 2024-05-31 RX ADMIN — IPRATROPIUM BROMIDE AND ALBUTEROL SULFATE 3 ML: .5; 3 SOLUTION RESPIRATORY (INHALATION) at 11:00

## 2024-05-31 RX ADMIN — BUDESONIDE 0.5 MG: 0.5 INHALANT RESPIRATORY (INHALATION) at 06:56

## 2024-05-31 RX ADMIN — DEXAMETHASONE 4 MG: 4 TABLET ORAL at 08:56

## 2024-05-31 RX ADMIN — CHOLESTYRAMINE 4 G: 4 POWDER, FOR SUSPENSION ORAL at 08:57

## 2024-05-31 RX ADMIN — METOLAZONE 5 MG: 5 TABLET ORAL at 08:56

## 2024-05-31 RX ADMIN — IPRATROPIUM BROMIDE AND ALBUTEROL SULFATE 3 ML: .5; 3 SOLUTION RESPIRATORY (INHALATION) at 06:52

## 2024-05-31 RX ADMIN — DILTIAZEM HYDROCHLORIDE 90 MG: 60 TABLET, FILM COATED ORAL at 15:33

## 2024-05-31 RX ADMIN — POLYETHYLENE GLYCOL 3350 17 G: 17 POWDER, FOR SOLUTION ORAL at 08:57

## 2024-05-31 RX ADMIN — DOCUSATE SODIUM 100 MG: 100 CAPSULE, LIQUID FILLED ORAL at 08:56

## 2024-05-31 RX ADMIN — DOCUSATE SODIUM 100 MG: 100 CAPSULE, LIQUID FILLED ORAL at 20:40

## 2024-05-31 RX ADMIN — FERROUS SULFATE TAB EC 324 MG (65 MG FE EQUIVALENT) 324 MG: 324 (65 FE) TABLET DELAYED RESPONSE at 08:57

## 2024-05-31 RX ADMIN — GUAIFENESIN 1200 MG: 600 TABLET, EXTENDED RELEASE ORAL at 20:40

## 2024-05-31 RX ADMIN — LEVOTHYROXINE SODIUM 50 MCG: 0.05 TABLET ORAL at 05:32

## 2024-05-31 RX ADMIN — APIXABAN 5 MG: 5 TABLET, FILM COATED ORAL at 20:40

## 2024-05-31 RX ADMIN — METOPROLOL SUCCINATE 50 MG: 50 TABLET, EXTENDED RELEASE ORAL at 08:56

## 2024-05-31 RX ADMIN — TACROLIMUS 1 MG: 1 CAPSULE ORAL at 20:40

## 2024-05-31 RX ADMIN — FUROSEMIDE 80 MG: 40 TABLET ORAL at 17:48

## 2024-05-31 RX ADMIN — GUAIFENESIN 1200 MG: 600 TABLET, EXTENDED RELEASE ORAL at 08:56

## 2024-05-31 RX ADMIN — HYDROCODONE BITARTRATE AND ACETAMINOPHEN 1 TABLET: 5; 325 TABLET ORAL at 10:32

## 2024-05-31 RX ADMIN — EMPAGLIFLOZIN 25 MG: 25 TABLET, FILM COATED ORAL at 08:56

## 2024-05-31 RX ADMIN — POTASSIUM CHLORIDE 40 MEQ: 1.5 POWDER, FOR SOLUTION ORAL at 15:34

## 2024-05-31 RX ADMIN — Medication 10 ML: at 08:57

## 2024-05-31 RX ADMIN — DULOXETINE HYDROCHLORIDE 40 MG: 20 CAPSULE, DELAYED RELEASE ORAL at 08:59

## 2024-05-31 RX ADMIN — POTASSIUM CHLORIDE 40 MEQ: 1500 TABLET, EXTENDED RELEASE ORAL at 08:56

## 2024-05-31 RX ADMIN — DILTIAZEM HYDROCHLORIDE 90 MG: 60 TABLET, FILM COATED ORAL at 05:32

## 2024-05-31 RX ADMIN — METOCLOPRAMIDE 5 MG: 5 TABLET ORAL at 15:33

## 2024-05-31 RX ADMIN — DILTIAZEM HYDROCHLORIDE 90 MG: 60 TABLET, FILM COATED ORAL at 20:40

## 2024-05-31 NOTE — THERAPY TREATMENT NOTE
"Subjective: Pt agreeable to therapeutic plan of care.  Cognition: oriented to Person, Place, Time, and Situation    Objective:     Bed Mobility: SBA   Functional Transfers: CGA and with rolling walker     Balance: supported and standing CGA  Functional Ambulation: CGA and with rolling walker    Grooming: Supervision  ADL Position: supported sitting  ADL Comments: combing hair, washing face    Vitals: Desaturates on 4 L    Pain: 0 VAS        Education: Provided education on the importance of mobility in the acute care setting, ADL training, Transfer Training, and Energy conservation strategies      Assessment: Jaja Carcamo presents with ADL impairments affecting function including balance, endurance / activity tolerance, and strength. Demonstrated functioning below baseline abilities indicate the need for continued skilled intervention while inpatient. Pt completed bed mobility with SBA.  Pt completed sit to stand transfer using RW with CGA.  Pt completed functional mobility to chair using RW with CGA.  Pt completed grooming tasks sitting in chair with Supervision.  Pt anxious the hospital is going to discharge her soon.Tolerating session today without incident. Will continue to follow and progress as tolerated.     Plan/Recommendations:   Moderate Intensity Therapy recommended post-acute care. This is recommended as therapy feels the patient would require 3-4 days per week and wouldn't tolerate \"3 hour daily\" rehab intensity. SNF would be the preferred choice. If the patient does not agree to SNF, arrange HH or OP depending on home bound status. If patient is medically complex, consider LTACH.. Pt requires no DME at discharge.     Pt desires Skilled Rehab placement at discharge. Pt cooperative; agreeable to therapeutic recommendations and plan of care.     Modified [place]: N/A = No pre-op stroke/TIA    Post-Tx Position: Up in Chair, Staff Present, Alarms activated, and Call light and personal items within " reach  PPE: gloves and gown

## 2024-05-31 NOTE — PLAN OF CARE
Problem: Adult Inpatient Plan of Care  Goal: Plan of Care Review  Outcome: Ongoing, Progressing  Goal: Patient-Specific Goal (Individualized)  Outcome: Ongoing, Progressing  Goal: Absence of Hospital-Acquired Illness or Injury  Outcome: Ongoing, Progressing  Intervention: Identify and Manage Fall Risk  Recent Flowsheet Documentation  Taken 5/31/2024 1600 by Smita Davis RN  Safety Promotion/Fall Prevention: safety round/check completed  Taken 5/31/2024 1400 by Smita Davis RN  Safety Promotion/Fall Prevention: safety round/check completed  Taken 5/31/2024 1200 by Smita Davis RN  Safety Promotion/Fall Prevention: safety round/check completed  Taken 5/31/2024 1000 by Smita Davis RN  Safety Promotion/Fall Prevention: safety round/check completed  Taken 5/31/2024 0815 by Smita Davis RN  Safety Promotion/Fall Prevention: safety round/check completed  Goal: Optimal Comfort and Wellbeing  Outcome: Ongoing, Progressing  Goal: Readiness for Transition of Care  Outcome: Ongoing, Progressing     Problem: Asthma Comorbidity  Goal: Maintenance of Asthma Control  Outcome: Ongoing, Progressing     Problem: Behavioral Health Comorbidity  Goal: Maintenance of Behavioral Health Symptom Control  Outcome: Ongoing, Progressing     Problem: COPD (Chronic Obstructive Pulmonary Disease) Comorbidity  Goal: Maintenance of COPD Symptom Control  Outcome: Ongoing, Progressing     Problem: Diabetes Comorbidity  Goal: Blood Glucose Level Within Targeted Range  Outcome: Ongoing, Progressing     Problem: Heart Failure Comorbidity  Goal: Maintenance of Heart Failure Symptom Control  Outcome: Ongoing, Progressing     Problem: Hypertension Comorbidity  Goal: Blood Pressure in Desired Range  Outcome: Ongoing, Progressing     Problem: Obstructive Sleep Apnea Risk or Actual Comorbidity Management  Goal: Unobstructed Breathing During Sleep  Outcome: Ongoing, Progressing     Problem: Osteoarthritis Comorbidity  Goal:  Maintenance of Osteoarthritis Symptom Control  Outcome: Ongoing, Progressing     Problem: Pain Chronic (Persistent) (Comorbidity Management)  Goal: Acceptable Pain Control and Functional Ability  Outcome: Ongoing, Progressing     Problem: Seizure Disorder Comorbidity  Goal: Maintenance of Seizure Control  Outcome: Ongoing, Progressing     Problem: Skin Injury Risk Increased  Goal: Skin Health and Integrity  Outcome: Ongoing, Progressing     Problem: Fall Injury Risk  Goal: Absence of Fall and Fall-Related Injury  Outcome: Ongoing, Progressing  Intervention: Promote Injury-Free Environment  Recent Flowsheet Documentation  Taken 5/31/2024 1600 by Smita Davis RN  Safety Promotion/Fall Prevention: safety round/check completed  Taken 5/31/2024 1400 by Smita Davis RN  Safety Promotion/Fall Prevention: safety round/check completed  Taken 5/31/2024 1200 by Smita Davis RN  Safety Promotion/Fall Prevention: safety round/check completed  Taken 5/31/2024 1000 by Smita Davis RN  Safety Promotion/Fall Prevention: safety round/check completed  Taken 5/31/2024 0815 by Smita Davis RN  Safety Promotion/Fall Prevention: safety round/check completed  Goal: Absence of Fall and Fall-Related Injury  Outcome: Ongoing, Progressing  Intervention: Promote Injury-Free Environment  Recent Flowsheet Documentation  Taken 5/31/2024 1600 by Smita Davis RN  Safety Promotion/Fall Prevention: safety round/check completed  Taken 5/31/2024 1400 by Smita Davis RN  Safety Promotion/Fall Prevention: safety round/check completed  Taken 5/31/2024 1200 by Smita Davis RN  Safety Promotion/Fall Prevention: safety round/check completed  Taken 5/31/2024 1000 by Smita Davis RN  Safety Promotion/Fall Prevention: safety round/check completed  Taken 5/31/2024 0815 by Smita Davis RN  Safety Promotion/Fall Prevention: safety round/check completed   Goal Outcome Evaluation:

## 2024-05-31 NOTE — PROGRESS NOTES
LOS: 7 days   Patient Care Team:  Jonathan Luna APRN as PCP - General (Family Medicine)  Jak Gavin MD as Cardiologist (Cardiology)    Subjective     Interval History: Stable overnight    Patient Complaints: Continues with shortness of breath.  Significant improvement on BLE edema    History taken from: patient    Review of Systems   Constitutional:  Positive for activity change and fatigue. Negative for appetite change, chills, diaphoresis and fever.   HENT:  Negative for facial swelling.    Eyes:  Negative for visual disturbance.   Respiratory:  Positive for shortness of breath. Negative for cough, wheezing and stridor.    Cardiovascular:  Positive for leg swelling. Negative for chest pain and palpitations.   Endocrine: Negative for polyuria.   Genitourinary:  Negative for dysuria.   Neurological:  Negative for headaches.           Objective     Vital Signs  Temp:  [97.6 °F (36.4 °C)-99.3 °F (37.4 °C)] 97.7 °F (36.5 °C)  Heart Rate:  [] 105  Resp:  [14-28] 14  BP: (110-143)/(47-74) 114/54    Physical Exam:     General Appearance:    Alert, cooperative, in no acute distress,   Head:    Normocephalic, without obvious abnormality, atraumatic   Eyes:            Lids and lashes normal, conjunctivae and sclerae normal, no   icterus, no pallor, corneas clear, PERRLA   Ears:    Ears appear intact with no abnormalities noted   Throat:   No oral lesions, no thrush, oral mucosa moist   Neck:   No adenopathy, supple, trachea midline, no thyromegaly, no   carotid bruit, no JVD   Lungs:     Clear to auscultation,respirations regular, even and              unlabored    Heart:    Irregular   Chest Wall:    No abnormalities observed   Abdomen:     Normal bowel sounds, no masses, no organomegaly, soft        Non-tender non-distended, no guarding,   Extremities:  Bilateral CVS changes; 1+bilateral lower ext edema   Pulses:   Pulses palpable and equal bilaterally   Skin:   No bleeding, bruising or rash   Lymph nodes:    No palpable adenopathy   Neurologic:   Cranial nerves 2 - 12 grossly intact, sensation intact, DTR       present and equal bilaterally        Results Review:    Lab Results (last 24 hours)       Procedure Component Value Units Date/Time    Basic Metabolic Panel [[id number]]  (Abnormal) Collected: 05/31/24 0538    Specimen: Blood Updated: 05/31/24 0614     Glucose 130 mg/dL      BUN 17 mg/dL      Creatinine 0.99 mg/dL      Sodium 141 mmol/L      Potassium 3.5 mmol/L      Chloride 91 mmol/L      CO2 37.9 mmol/L      Calcium 10.7 mg/dL      BUN/Creatinine Ratio 17.2     Anion Gap 12.1 mmol/L      eGFR 58.8 mL/min/1.73     Narrative:      GFR Normal >60  Chronic Kidney Disease <60  Kidney Failure <15    The GFR formula is only valid for adults with stable renal function between ages 18 and 70.    CBC & Differential [[id number]]  (Abnormal) Collected: 05/31/24 0538    Specimen: Blood Updated: 05/31/24 0553    Narrative:      The following orders were created for panel order CBC & Differential.  Procedure                               Abnormality         Status                     ---------                               -----------         ------                     CBC Auto Differential[[id number]]        Abnormal            Final result                 Please view results for these tests on the individual orders.    CBC Auto Differential [[id number]]  (Abnormal) Collected: 05/31/24 0538    Specimen: Blood Updated: 05/31/24 0553     WBC 7.21 10*3/mm3      RBC 3.70 10*6/mm3      Hemoglobin 10.5 g/dL      Hematocrit 36.0 %      MCV 97.3 fL      MCH 28.4 pg      MCHC 29.2 g/dL      RDW 17.4 %      RDW-SD 61.8 fl      MPV 8.8 fL      Platelets 179 10*3/mm3      Neutrophil % 84.4 %      Lymphocyte % 7.1 %      Monocyte % 7.8 %      Eosinophil % 0.0 %      Basophil % 0.1 %      Immature Grans % 0.6 %      Neutrophils, Absolute 6.09 10*3/mm3      Lymphocytes, Absolute 0.51 10*3/mm3      Monocytes, Absolute 0.56 10*3/mm3       Eosinophils, Absolute 0.00 10*3/mm3      Basophils, Absolute 0.01 10*3/mm3      Immature Grans, Absolute 0.04 10*3/mm3      nRBC 0.0 /100 WBC     Tacrolimus Level [[id number]] Collected: 05/28/24 0840    Specimen: Blood Updated: 05/30/24 1511     Tacrolimus 7.0 ng/mL      Comment:         Trough (immediately following                  transplant)                       15.0          Trough (steady state, 2 weeks or                  more after transplant):      3.0 - 8.0          Performed by LC-MS/MS technology.       Narrative:      Test(s) 700964-Tacrolimus (), Blood  was developed and its performance characteristics determined  by [facility]. It has not been cleared or approved by the Food  and Drug Administration.  Performed at:  [address]  [id number]  : Star Flores MD, Phone:  [phone]             Imaging Results (Last 24 Hours)       Procedure Component Value Units Date/Time    XR Chest 1 View [[id number]] Collected: 05/31/24 1248     Updated: 05/31/24 1251    Narrative:      XR CHEST 1 VW    Date of Exam: 5/31/2024 12:31 PM EDT    Indication: Continued dyspnea, CHF    Comparison: 5/24/2024.    Findings:  There is stable mild to moderate cardiomegaly. There is pulmonary vascular congestion. There is a prominent interstitial pattern which appears similar. There are continued small pleural effusions. Lung fields remain hyperinflated.      Impression:      Impression:  Findings suggest CHF with mild pulmonary edema and small effusions although a component of underlying chronic lung disease is thought to be present as well.      Electronically Signed: Danna Carrera MD    5/31/2024 12:49 PM EDT    Workstation ID: [id number]                 I reviewed the patient's new clinical results.    Medication Review:   Scheduled Meds:acetaZOLAMIDE (DIAMOX) 500 mg in sterile water (preservative free) 5 mL (100 mg/mL) injection, 500 mg, Intravenous, Once  apixaban,  5 mg, Oral, Q12H  budesonide, 0.5 mg, Nebulization, BID - RT  cholestyramine light, 1 packet, Oral, Daily  dexAMETHasone, 4 mg, Oral, Daily With Breakfast  dilTIAZem, 90 mg, Oral, Q8H  docusate sodium, 100 mg, Oral, BID  DULoxetine, 40 mg, Oral, Daily  empagliflozin, 25 mg, Oral, Daily  ferrous sulfate, 324 mg, Oral, Daily With Breakfast  furosemide, 80 mg, Oral, BID  guaiFENesin, 1,200 mg, Oral, BID  ipratropium-albuterol, 3 mL, Nebulization, 4x Daily - RT  levothyroxine, 50 mcg, Oral, Q AM  metoclopramide, 5 mg, Oral, 4x Daily AC & at Bedtime  metOLazone, 5 mg, Oral, Once per day on Monday Wednesday Friday  metoprolol succinate XL, 50 mg, Oral, Daily  polyethylene glycol, 17 g, Oral, Daily  potassium chloride, 40 mEq, Oral, Daily  potassium chloride, 40 mEq, Oral, Q4H  predniSONE, 5 mg, Oral, Daily With Breakfast  sodium chloride, 10 mL, Intravenous, Q12H  tacrolimus, 1 mg, Oral, BID      Continuous Infusions:     PRN Meds:.  acetaminophen    Calcium Replacement - Follow Nurse / BPA Driven Protocol    carboxymethylcellulose sod    diazePAM    Diclofenac Sodium    hydrALAZINE    HYDROcodone-acetaminophen    loperamide    Magnesium Standard Dose Replacement - Follow Nurse / BPA Driven Protocol    nitroglycerin    ondansetron ODT **OR** ondansetron    Phosphorus Replacement - Follow Nurse / BPA Driven Protocol    Potassium Replacement - Follow Nurse / BPA Driven Protocol    [COMPLETED] Insert Peripheral IV **AND** sodium chloride    sodium chloride    sodium chloride     Assessment & Plan       Acute exacerbation of CHF (congestive heart failure)  - Jardiance, metolazone MWF; off Lasix drip-received total of 12 hours with significant improvement, continue fluid restriction, monitor clinical fluid statu closely    Macrocytic anemia - hg is stable  Atrial fib - rate controlled; she is anticoagulated  Chronic CAD - no indication for further ischemic workul  Pulmonary htn  H/o kidney transplant - prednisone,  Prograf    Plan for disposition:SNF when able    Mariangel Stearns, TONYA  05/31/24  13:45 EDT

## 2024-05-31 NOTE — PROGRESS NOTES
Referring Provider: Juanis Lowe MD    Reason for follow-up: Shortness of breath     Patient Care Team:  Jonathan Luna APRN as PCP - General (Family Medicine)  Jak Gavin MD as Cardiologist (Cardiology)      SUBJECTIVE  Reports improvement in breathing however wants to stay in the hospital longer.  She is currently on Lasix drip.     ROS  Review of all systems negative except as indicated.    Since I have last seen, the patient has been without any chest discomfort, shortness of breath, palpitations, dizziness or syncope.  Denies having any headache, abdominal pain, nausea, vomiting, diarrhea, constipation, loss of weight or loss of appetite.  Denies having any excessive bruising, hematuria or blood in the stool.  ROS      Personal History:    Past Medical History:   Diagnosis Date    Allergic rhinitis 04/14/2015    Asthma 08/10/2017    Atrial flutter 05/11/2017    Chronic diarrhea 04/15/2019    Chronic hypoxemic respiratory failure 01/18/2024    Chronic pain 02/03/2015    COPD (chronic obstructive pulmonary disease)     COVID-19 virus detected 08/11/2022    Cytokine release syndrome, grade 1 08/16/2022    Edema of both lower extremities due to peripheral venous insufficiency 03/12/2021    ESRD on hemodialysis 05/22/2013    Essential (primary) hypertension 03/03/2022    Fracture of ulnar styloid 05/19/2022    Fracture of unspecified carpal bone, right wrist, subsequent encounter for fracture with routine healing 03/03/2022    Gastroesophageal reflux disease 11/12/2020    Gout 08/10/2017    Hearing loss 08/10/2017    History of appendectomy     History of DVT (deep vein thrombosis) 11/12/2020    History of kidney transplant 06/30/2017    History of lobectomy of lung 01/18/2024 03/08/2016:  RIGHT Lower Lobe Mass--> Right Video-assisted thoracoscopy with a moderate-to-large wedge resection of the RLL (by Dr. Haris Mcconnell @ [facility])--> Poorly differentiated carcinoma of the RLL.      History of repair  of hip joint 05/21/2013    History of suicide attempt 05/20/2024    Hyperlipidemia 08/11/2014    Hypothyroidism, unspecified 03/03/2022    Infection due to extended spectrum beta lactamase (ESBL) producing bacteria 03/11/2016    No A2K system hx. +ESBL E coli urine on 3/11/16.      Irritable bowel syndrome 04/15/2019    Long term (current) use of immunosuppressive biologic 04/28/2021    Long term current use of anticoagulant therapy 01/18/2024    Malignant neoplasm of lung 08/10/2017    Menopausal flushing 08/30/2021    Mitral valve regurgitation 07/06/2015    Mixed anxiety and depressive disorder 04/30/2015    Non-small cell lung cancer 08/10/2017    Nonobstructive atherosclerosis of coronary artery 06/02/2019 08/12[facility]2022: [facility]: LM:-nl;  LAD: diffuse, Ca++; 30%; CIRC: Dominant. Normal; RCA: small; 50% diffuse, proximal and mid-segment.      NSTEMI (non-ST elevated myocardial infarction) 08/11/2022    Obsessive-compulsive disorder 04/15/2019    Osteoarthritis 05/20/2024    Paroxysmal atrial fibrillation 05/11/2017    Paroxysmal supraventricular tachycardia 05/11/2017    Peripheral neuropathy 08/11/2014    Personal history of peptic ulcer disease 11/12/2020    Postoperative anemia due to acute blood loss 05/22/2013    Right wrist fracture 03/01/2022    Seasonal allergies 08/10/2017    Secondary hyperparathyroidism of renal origin 09/14/2015    Tricuspid valve regurgitation 03/15/2017    Ulcer of lower extremity 08/30/2021    Unspecified acute appendicitis 03/03/2022    Valvular heart disease 05/20/2024    Wegener's granulomatosis with renal involvement 03/03/2022       Past Surgical History:   Procedure Laterality Date    APPENDECTOMY      BREAST SURGERY Left     cysts rmeoved    CARDIAC CATHETERIZATION Right 08/12/2022    Procedure: Left Heart Cath and coronary angiogram;  Surgeon: Jak Gavin MD;  Location: [facility] INVASIVE LOCATION;  Service: Cardiology;   Laterality: Right;    CLOSED REDUCTION WRIST FRACTURE Right 03/01/2022    Procedure: WRIST CLOSED REDUCTION;  Surgeon: Gaudencio Townsend MD;  Location: [facility] MAIN OR;  Service: Orthopedics;  Laterality: Right;    CYSTOSCOPY      HIP ARTHROPLASTY      HYSTERECTOMY      LUNG LOBECTOMY Right     TRANSPLANTATION RENAL         Family History   Problem Relation Age of Onset    No Known Problems Mother     No Known Problems Father        Social History     Tobacco Use    Smoking status: Former    Smokeless tobacco: Never   Vaping Use    Vaping status: Former   Substance Use Topics    Alcohol use: Never    Drug use: Never        Home meds:  Prior to Admission medications    Medication Sig Start Date End Date Taking? Authorizing Provider   acetaminophen (Tylenol) 325 MG tablet Take 2 tablets by mouth Every 4 (Four) Hours As Needed for Fever or Mild Pain. 3/13/20   Court Vences MD   albuterol sulfate  (90 Base) MCG/ACT inhaler Inhale 2 puffs Every 6 (Six) Hours As Needed for Wheezing.    Court Vences MD   apixaban (ELIQUIS) 5 MG tablet tablet Take 1 tablet by mouth Every 12 (Twelve) Hours. 8/16/22   Clyde White,    Carboxymethylcellulose Sodium (DRY EYE RELIEF OP) Apply 2 drops to eye(s) as directed by provider 2 (Two) Times a Day As Needed (dry eyes). 5/11/22   Court Vences MD   colestipol (COLESTID) 1 g tablet Take 1 tablet by mouth Daily.    Court Vences MD   diazePAM (VALIUM) 5 MG tablet Take 5 mg by mouth 2 (Two) Times a Day As Needed for Anxiety.    Court Vences MD   Diclofenac Sodium (Aspercreme Arthritis Pain) 1 % gel gel Apply 4 g topically to the appropriate area as directed 2 (Two) Times a Day As Needed (pain). 10/21/20   Court Vences MD   dilTIAZem (CARDIZEM) 90 MG tablet Take 1 tablet by mouth Every 8 (Eight) Hours. 5/22/24   Mariangel Stearns APRN   DULoxetine HCl 40 MG capsule delayed-release particles Take 1 capsule by mouth Daily.     Court Vences MD   ferrous sulfate 324 (65 Fe) MG tablet delayed-release EC tablet Take 1 tablet by mouth Daily With Breakfast. 5/23/24   Mariangel Stearns APRN   furosemide (LASIX) 80 MG tablet Take 1 tablet by mouth 2 (Two) Times a Day. 5/22/24   Mariangel Stearns APRN   guaiFENesin (Mucinex) 600 MG 12 hr tablet Take 1,200 mg by mouth 2 (Two) Times a Day. 8/16/22   Court Vences MD   levothyroxine (SYNTHROID, LEVOTHROID) 50 MCG tablet Take 1 tablet by mouth Daily.    Court Vences MD   loperamide (IMODIUM) 2 MG capsule Take 2 mg by mouth 4 (Four) Times a Day As Needed for Diarrhea. 3/13/20   Court Vences MD   metoprolol succinate XL (TOPROL-XL) 50 MG 24 hr tablet Take 50 mg by mouth Daily.    Court Vences MD   Mmpyl-Uapvk-Wlcibzv-Pramoxine (Neosporin + Pain Relief Max St) 1 % ointment Apply 1 application topically to the appropriate area as directed 2 (Two) Times a Day As Needed (sores). 7/2/20   Court Vences MD   potassium chloride (KLOR-CON M20) 20 MEQ CR tablet Take 2 tablets by mouth Daily. 5/22/24   Mariangel Stearns APRN   predniSONE (DELTASONE) 5 MG tablet Take 5 mg by mouth Daily.    Court Vences MD   Salicylic Acid-Urea (KERASAL EX) Apply 1 application topically to the appropriate area as directed 2 (Two) Times a Day As Needed (dry feet). 11/23/19   Court Vences MD   tacrolimus (PROGRAF) 1 MG capsule Take 1 mg by mouth 2 (Two) Times a Day.    Court Vences MD       Allergies:  Zolpidem    Scheduled Meds:apixaban, 5 mg, Oral, Q12H  budesonide, 0.5 mg, Nebulization, BID - RT  cholestyramine light, 1 packet, Oral, Daily  dexAMETHasone, 4 mg, Oral, Daily With Breakfast  dilTIAZem, 90 mg, Oral, Q8H  docusate sodium, 100 mg, Oral, BID  DULoxetine, 40 mg, Oral, Daily  empagliflozin, 25 mg, Oral, Daily  ferrous sulfate, 324 mg, Oral, Daily With Breakfast  guaiFENesin, 1,200 mg, Oral, BID  ipratropium-albuterol, 3 mL,  "Nebulization, 4x Daily - RT  levothyroxine, 50 mcg, Oral, Q AM  metoclopramide, 5 mg, Oral, 4x Daily AC & at Bedtime  metOLazone, 5 mg, Oral, Once per day on Monday Wednesday Friday  metoprolol succinate XL, 50 mg, Oral, Daily  polyethylene glycol, 17 g, Oral, Daily  potassium chloride, 40 mEq, Oral, Daily  predniSONE, 5 mg, Oral, Daily With Breakfast  sodium chloride, 10 mL, Intravenous, Q12H  tacrolimus, 1 mg, Oral, BID      Continuous Infusions:   PRN Meds:.  acetaminophen    Calcium Replacement - Follow Nurse / BPA Driven Protocol    carboxymethylcellulose sod    diazePAM    Diclofenac Sodium    hydrALAZINE    HYDROcodone-acetaminophen    loperamide    Magnesium Standard Dose Replacement - Follow Nurse / BPA Driven Protocol    nitroglycerin    ondansetron ODT **OR** ondansetron    Phosphorus Replacement - Follow Nurse / BPA Driven Protocol    Potassium Replacement - Follow Nurse / BPA Driven Protocol    [COMPLETED] Insert Peripheral IV **AND** sodium chloride    sodium chloride    sodium chloride      OBJECTIVE    Vital Signs  Vitals:    05/31/24 0652 05/31/24 0655 05/31/24 0656 05/31/24 0659   BP:       BP Location:       Patient Position:       Pulse: 105 99 96 93   Resp: 16 16 16 16   Temp:       TempSrc:       SpO2: 90% 100% 100% 100%   Weight:       Height:           Flowsheet Rows      Flowsheet Row First Filed Value   Admission Height 167.6 cm (66\") Documented at 05/24/2024 1143   Admission Weight 86.2 kg (190 lb) Documented at 05/24/2024 1143              Intake/Output Summary (Last 24 hours) at 5/31/2024 0730  Last data filed at 5/31/2024 0511  Gross per 24 hour   Intake 50 ml   Output 7300 ml   Net -7250 ml          Telemetry: Atrial fibrillation with heart rate in the 60s and 70s    Physical Exam:  The patient is alert, oriented and in no distress.  Obese  Vital signs as noted above.  Head and neck revealed no carotid bruits or jugular venous distention.  No thyromegaly or lymphadenopathy is " present  Lungs clear.  No wheezing.  Breath sounds are normal bilaterally.  Heart normal first and second heart sounds.  No murmur. No precordial rub is present.  No gallop is present.  Abdomen soft and nontender.  No organomegaly is present.  Extremities with good peripheral pulses without any pedal edema.  Skin warm and dry.  Musculoskeletal system is grossly normal.  CNS grossly normal.       Results Review:  I have personally reviewed the results from the time of this admission to 5/31/2024 07:30 EDT and agree with these findings:  []  Laboratory  []  Microbiology  []  Radiology  []  EKG/Telemetry   []  Cardiology/Vascular   []  Pathology  []  Old records  []  Other:    Most notable findings include:    Lab Results (last 24 hours)       Procedure Component Value Units Date/Time    Basic Metabolic Panel [[id number]]  (Abnormal) Collected: 05/31/24 0538    Specimen: Blood Updated: 05/31/24 0614     Glucose 130 mg/dL      BUN 17 mg/dL      Creatinine 0.99 mg/dL      Sodium 141 mmol/L      Potassium 3.5 mmol/L      Chloride 91 mmol/L      CO2 37.9 mmol/L      Calcium 10.7 mg/dL      BUN/Creatinine Ratio 17.2     Anion Gap 12.1 mmol/L      eGFR 58.8 mL/min/1.73     Narrative:      GFR Normal >60  Chronic Kidney Disease <60  Kidney Failure <15    The GFR formula is only valid for adults with stable renal function between ages 18 and 70.    CBC & Differential [[id number]]  (Abnormal) Collected: 05/31/24 0538    Specimen: Blood Updated: 05/31/24 0553    Narrative:      The following orders were created for panel order CBC & Differential.  Procedure                               Abnormality         Status                     ---------                               -----------         ------                     CBC Auto Differential[[id number]]        Abnormal            Final result                 Please view results for these tests on the individual orders.    CBC Auto Differential [[id number]]  (Abnormal) Collected:  05/31/24 0538    Specimen: Blood Updated: 05/31/24 0553     WBC 7.21 10*3/mm3      RBC 3.70 10*6/mm3      Hemoglobin 10.5 g/dL      Hematocrit 36.0 %      MCV 97.3 fL      MCH 28.4 pg      MCHC 29.2 g/dL      RDW 17.4 %      RDW-SD 61.8 fl      MPV 8.8 fL      Platelets 179 10*3/mm3      Neutrophil % 84.4 %      Lymphocyte % 7.1 %      Monocyte % 7.8 %      Eosinophil % 0.0 %      Basophil % 0.1 %      Immature Grans % 0.6 %      Neutrophils, Absolute 6.09 10*3/mm3      Lymphocytes, Absolute 0.51 10*3/mm3      Monocytes, Absolute 0.56 10*3/mm3      Eosinophils, Absolute 0.00 10*3/mm3      Basophils, Absolute 0.01 10*3/mm3      Immature Grans, Absolute 0.04 10*3/mm3      nRBC 0.0 /100 WBC     Tacrolimus Level [[id number]] Collected: 05/28/24 0840    Specimen: Blood Updated: 05/30/24 1511     Tacrolimus 7.0 ng/mL      Comment:         Trough (immediately following                  transplant)                       15.0          Trough (steady state, 2 weeks or                  more after transplant):      3.0 - 8.0          Performed by LC-MS/MS technology.       Narrative:      Test(s) 700964-Tacrolimus (), Blood  was developed and its performance characteristics determined  by [facility]. It has not been cleared or approved by the Food  and Drug Administration.  Performed at:  [address]  [id number]  : Star Flores MD, Phone:  [phone]            Imaging Results (Last 24 Hours)       ** No results found for the last 24 hours. **            LAB RESULTS (LAST 7 DAYS)    CBC  Results from last 7 days   Lab Units 05/31/24  0538 05/30/24  0503 05/29/24  0446 05/28/24  0450 05/27/24  0526 05/26/24  0404 05/25/24  0443   WBC 10*3/mm3 7.21 5.59 5.68 6.01 6.34 5.25 5.75   RBC 10*6/mm3 3.70* 3.06* 3.03* 3.31* 3.55* 3.11* 3.39*   HEMOGLOBIN g/dL 10.5* 9.0* 8.8* 9.4* 10.2* 8.9* 9.6*   HEMATOCRIT % 36.0 30.7* 30.6* 33.7* 36.0 31.0* 33.8*   MCV fL 97.3* 100.3* 101.0*  101.8* 101.4* 99.7* 99.7*   PLATELETS 10*3/mm3 179 149 152 161 168 143 143       BMP  Results from last 7 days   Lab Units 05/31/24 0538 05/30/24 0503 05/29/24 0446 05/28/24 0450 05/27/24 0526 05/26/24 0404 05/25/24 0443 05/24/24  1408   SODIUM mmol/L 141 140 141 141 140 142 141  --    POTASSIUM mmol/L 3.5 3.8 4.3 4.6 4.3 4.2 4.0  --    CHLORIDE mmol/L 91* 99 101 99 97* 98 97*  --    CO2 mmol/L 37.9* 33.6* 33.6* 35.9* 34.6* 36.3* 36.0*  --    BUN mg/dL 17 15 16 19 20 25* 24*  --    CREATININE mg/dL 0.99 0.75 0.76 0.84 0.89 1.10* 1.16*  --    GLUCOSE mg/dL 130* 91 94 98 105* 91 104*  --    MAGNESIUM mg/dL  --   --   --   --  1.9 1.8 2.1 1.8   PHOSPHORUS mg/dL  --   --   --   --   --   --  4.3  --        CMP   Results from last 7 days   Lab Units 05/31/24 0538 05/30/24 0503 05/29/24 0446 05/28/24 0450 05/27/24 0526 05/26/24 0404 05/25/24 0443 05/24/24  1210   SODIUM mmol/L 141 140 141 141 140 142 141 137   POTASSIUM mmol/L 3.5 3.8 4.3 4.6 4.3 4.2 4.0 4.0   CHLORIDE mmol/L 91* 99 101 99 97* 98 97* 94*   CO2 mmol/L 37.9* 33.6* 33.6* 35.9* 34.6* 36.3* 36.0* 32.9*   BUN mg/dL 17 15 16 19 20 25* 24* 21   CREATININE mg/dL 0.99 0.75 0.76 0.84 0.89 1.10* 1.16* 1.16*   GLUCOSE mg/dL 130* 91 94 98 105* 91 104* 154*   ALBUMIN g/dL  --   --   --   --   --   --   --  3.6   BILIRUBIN mg/dL  --   --   --   --   --   --   --  1.2   ALK PHOS U/L  --   --   --   --   --   --   --  59   AST (SGOT) U/L  --   --   --   --   --   --   --  16   ALT (SGPT) U/L  --   --   --   --   --   --   --  9       BNP        TROPONIN  Results from last 7 days   Lab Units 05/24/24  1408   HSTROP T ng/L 35*       CoAg  Results from last 7 days   Lab Units 05/24/24  1210   INR  1.16*   APTT seconds 30.9*       Creatinine Clearance  Estimated Creatinine Clearance: 52.2 mL/min (by C-G formula based on SCr of 0.99 mg/dL).    ABG  Results from last 7 days   Lab Units 05/24/24  1225   PH, ARTERIAL pH units 7.442   PCO2, ARTERIAL mm Hg 51.4*   PO2  ART mm Hg 56.7*   O2 SATURATION ART % 89.5*   BASE EXCESS ART mmol/L 9.6*       Radiology  No radiology results for the last day      EKG  I personally viewed and interpreted the patient's EKG/Telemetry data:  ECG 12 Lead Dyspnea   Final Result   HEART RATE= 82  bpm   RR Interval= 732  ms   [place] Interval=   ms   P Horizontal Axis=   deg   P Front Axis=   deg   QRSD Interval= 88  ms   QT Interval= 407  ms   QTcB= 476  ms   QRS Axis= -32  deg   T Wave Axis= 49  deg   - ABNORMAL ECG -   Atrial fibrillation   Left axis deviation   When compared with ECG of 20-May-2024 9:37:54,   Significant rate increase   Significant repolarization change   Electronically Signed By: Praneeth Theodore (Derek) 25-May-2024 07:53:06   Date and Time of Study: 2024-05-24 11:36:06      Telemetry Scan   Final Result      Telemetry Scan   Final Result      Telemetry Scan   Final Result      Telemetry Scan   Final Result      Telemetry Scan   Final Result      Telemetry Scan   Final Result      Telemetry Scan   Final Result      Telemetry Scan   Final Result      Telemetry Scan   Final Result      Telemetry Scan   Final Result      Telemetry Scan   Final Result      Telemetry Scan   Final Result      Telemetry Scan   Final Result      Telemetry Scan   Final Result      Telemetry Scan   Final Result      Telemetry Scan   Final Result      Telemetry Scan   Final Result      Telemetry Scan   Final Result            Echocardiogram:    Results for orders placed during the hospital encounter of 05/17/24    Adult Transthoracic Echo Complete W/ Cont if Necessary Per Protocol    Interpretation Summary    Left ventricular systolic function is normal. Calculated left ventricular EF = 55% Left ventricular ejection fraction appears to be 51 - 55%.    Left ventricular diastolic function was indeterminate.    Left atrial volume is moderately increased.    The right atrial cavity is dilated.    Severe tricuspid valve regurgitation is present.    Estimated right  ventricular systolic pressure from tricuspid regurgitation is markedly elevated (>55 mmHg).    Mild to moderate mitral valve regurgitation is present        Stress Test:         Cardiac Catheterization:  Results for orders placed during the hospital encounter of 08/11/22    Cardiac Catheterization/Vascular Study    Conclusion  IMPRESSIONS  Non obstructive CAD         Other:         ASSESSMENT & PLAN:    Principal Problem:    Acute exacerbation of CHF (congestive heart failure)      Atrial fibrillation  [id number] score is 5  Continue Eliquis for stroke prevention  Heart rate is well-controlled on Toprol-XL and Cardizem.  Unable to take Cardizem CD due to interaction with immunosuppressants.  TSH is normal     HFpEF/Pulmonary hypertension  Presented with proBNP of 4000 (compared to 8000 during previous admission)  She is now on Lasix drip per nephrology  Monitor I's and O's and replace electrolytes as needed  Continue Toprol-XL  Now on Jardiance  Echocardiogram shows preserved LV function with severe tricuspid valve regurgitation and severe pulmonary hypertension.     UTI  On antibiotics     History of kidney transplant  On prednisone and Prograf  Creatinine 0.99, GFR is 58.8  Diuretics with high doses of Lasix per nephrology  Metolazone as needed per nephrology  Bicarb is 38 now     COPD  History of lung cancer  CT shows minimal interval enlargement of the tissue surrounding the postsurgical changes associated with small bilateral pleural effusions.  Continue bronchodilators.  She should follow-up with an oncologist outpatient.     Obesity  Lifestyle modifications recommended to the patient.  She weighs 186 pounds.  BMI is 30.14    Jak Gavin MD  05/31/24  07:30 EDT

## 2024-05-31 NOTE — THERAPY TREATMENT NOTE
"Subjective: Pt agreeable to therapeutic plan of care. States several times she is too weak for rehab and her doctors have told her she can stay here as long as she wants.     Objective:     Bed mobility - N/A or Not attempted. Up in chair on arrival   Transfers - CGA and with rolling walker  Ambulation - 150 feet CGA and with rolling walker    Therapeutic Exercise - 10 Reps Bilaterally AROM supported sitting / chair    Vitals: Desaturates On 4L/min drops as low as 84 with exertion and recovers within 1 minute requiring cues for box breathing      Education: Provided education on the importance of mobility in the acute care setting, Verbal/Tactile Cues, Transfer Training, Gait Training, and Energy conservation strategies Educated Pt that she is ready for rehab from a functional stand point and what to expect. Educated Pt and son on use of life alert of apple watch at home post rehab stay.     Assessment: Jaja Carcamo presents with functional mobility impairments which indicate the need for skilled intervention. Pt requires cues and encouragement throughout. Has high anxiety about mobility as well as going to rehab. Tolerating session today without incident. Will continue to follow and progress as tolerated.     Plan/Recommendations:   If medically appropriate, Moderate Intensity Therapy recommended post-acute care. This is recommended as therapy feels the patient would require 3-4 days per week and wouldn't tolerate \"3 hour daily\" rehab intensity. SNF would be the preferred choice. If the patient does not agree to SNF, arrange HH or OP depending on home bound status. If patient is medically complex, consider LTACH. Pt requires no DME at discharge.     Pt desires Skilled Rehab placement at discharge. Pt cooperative; agreeable to therapeutic recommendations and plan of care.         Basic Mobility 6-click:  Rollin = Total, A lot = 2, A little = 3; 4 = None  Supine>Sit:   1 = Total, A lot = 2, A little = " 3; 4 = None   Sit>Stand with arms:  1 = Total, A lot = 2, A little = 3; 4 = None  Bed>Chair:   1 = Total, A lot = 2, A little = 3; 4 = None  Ambulate in room:  1 = Total, A lot = 2, A little = 3; 4 = None  3-5 Steps with railin = Total, A lot = 2, A little = 3; 4 = None  Score: 17    Modified [place]: N/A = No pre-op stroke/TIA    Post-Tx Position: Up in Chair, Alarms activated, and Call light and personal items within reach  PPE: gloves

## 2024-05-31 NOTE — PROGRESS NOTES
"     LOS: 6 days   Patient Care Team:  Jonathan Luna APRN as PCP - General (Family Medicine)  Jak Gavin MD as Cardiologist (Cardiology)    Subjective     Interval History: Stable overnight    Patient Complaints: \"I'm still smothering\"    History taken from: patient    Review of Systems   Constitutional:  Positive for activity change and fatigue. Negative for appetite change, chills, diaphoresis and fever.   HENT:  Negative for facial swelling.    Eyes:  Negative for visual disturbance.   Respiratory:  Positive for shortness of breath. Negative for cough, wheezing and stridor.    Cardiovascular:  Positive for leg swelling. Negative for chest pain and palpitations.   Endocrine: Negative for polyuria.   Genitourinary:  Negative for dysuria.   Neurological:  Negative for headaches.           Objective     Vital Signs  Temp:  [97 °F (36.1 °C)-99.3 °F (37.4 °C)] 99.1 °F (37.3 °C)  Heart Rate:  [] 105  Resp:  [14-28] 28  BP: (112-147)/(51-72) 137/58    Physical Exam:     General Appearance:    Alert, cooperative, in no acute distress,   Head:    Normocephalic, without obvious abnormality, atraumatic   Eyes:            Lids and lashes normal, conjunctivae and sclerae normal, no   icterus, no pallor, corneas clear, PERRLA   Ears:    Ears appear intact with no abnormalities noted   Throat:   No oral lesions, no thrush, oral mucosa moist   Neck:   No adenopathy, supple, trachea midline, no thyromegaly, no   carotid bruit, no JVD   Lungs:     Clear to auscultation,respirations regular, even and              unlabored    Heart:    Irregular   Chest Wall:    No abnormalities observed   Abdomen:     Normal bowel sounds, no masses, no organomegaly, soft        Non-tender non-distended, no guarding,   Extremities:  Bilateral CVS changes; 1+bilateral lower ext edema   Pulses:   Pulses palpable and equal bilaterally   Skin:   No bleeding, bruising or rash   Lymph nodes:   No palpable adenopathy   Neurologic:   Cranial " nerves 2 - 12 grossly intact, sensation intact, DTR       present and equal bilaterally        Results Review:    Lab Results (last 24 hours)       Procedure Component Value Units Date/Time    Tacrolimus Level [[id number]] Collected: 05/28/24 0840    Specimen: Blood Updated: 05/30/24 1511     Tacrolimus 7.0 ng/mL      Comment:         Trough (immediately following                  transplant)                       15.0          Trough (steady state, 2 weeks or                  more after transplant):      3.0 - 8.0          Performed by LC-MS/MS technology.       Narrative:      Test(s) 700964-Tacrolimus (), Blood  was developed and its performance characteristics determined  by [facility]. It has not been cleared or approved by the Food  and Drug Administration.  Performed at:  [address]  [id number]  : Star Flores MD, Phone:  [phone]    Basic Metabolic Panel [[id number]]  (Abnormal) Collected: 05/30/24 0503    Specimen: Blood Updated: 05/30/24 0644     Glucose 91 mg/dL      BUN 15 mg/dL      Creatinine 0.75 mg/dL      Sodium 140 mmol/L      Potassium 3.8 mmol/L      Chloride 99 mmol/L      CO2 33.6 mmol/L      Calcium 9.8 mg/dL      BUN/Creatinine Ratio 20.0     Anion Gap 7.4 mmol/L      eGFR 82.1 mL/min/1.73     Narrative:      GFR Normal >60  Chronic Kidney Disease <60  Kidney Failure <15    The GFR formula is only valid for adults with stable renal function between ages 18 and 70.    CBC & Differential [[id number]]  (Abnormal) Collected: 05/30/24 0503    Specimen: Blood Updated: 05/30/24 0621    Narrative:      The following orders were created for panel order CBC & Differential.  Procedure                               Abnormality         Status                     ---------                               -----------         ------                     CBC Auto Differential[[id number]]        Abnormal            Final result                 Please  view results for these tests on the individual orders.    CBC Auto Differential [[id number]]  (Abnormal) Collected: 05/30/24 0503    Specimen: Blood Updated: 05/30/24 0621     WBC 5.59 10*3/mm3      RBC 3.06 10*6/mm3      Hemoglobin 9.0 g/dL      Hematocrit 30.7 %      .3 fL      MCH 29.4 pg      MCHC 29.3 g/dL      RDW 17.3 %      RDW-SD 62.4 fl      MPV 9.4 fL      Platelets 149 10*3/mm3      Neutrophil % 76.2 %      Lymphocyte % 9.5 %      Monocyte % 10.7 %      Eosinophil % 2.7 %      Basophil % 0.5 %      Immature Grans % 0.4 %      Neutrophils, Absolute 4.26 10*3/mm3      Lymphocytes, Absolute 0.53 10*3/mm3      Monocytes, Absolute 0.60 10*3/mm3      Eosinophils, Absolute 0.15 10*3/mm3      Basophils, Absolute 0.03 10*3/mm3      Immature Grans, Absolute 0.02 10*3/mm3      nRBC 0.0 /100 WBC              Imaging Results (Last 24 Hours)       ** No results found for the last 24 hours. **                 I reviewed the patient's new clinical results.    Medication Review:   Scheduled Meds:apixaban, 5 mg, Oral, Q12H  budesonide, 0.5 mg, Nebulization, BID - RT  cholestyramine light, 1 packet, Oral, Daily  dexAMETHasone, 4 mg, Oral, Daily With Breakfast  dilTIAZem, 90 mg, Oral, Q8H  docusate sodium, 100 mg, Oral, BID  DULoxetine, 40 mg, Oral, Daily  empagliflozin, 25 mg, Oral, Daily  ferrous sulfate, 324 mg, Oral, Daily With Breakfast  guaiFENesin, 1,200 mg, Oral, BID  ipratropium-albuterol, 3 mL, Nebulization, 4x Daily - RT  levothyroxine, 50 mcg, Oral, Q AM  metoclopramide, 5 mg, Oral, 4x Daily AC & at Bedtime  [START ON 5/31/2024] metOLazone, 5 mg, Oral, Once per day on Monday Wednesday Friday  metoprolol succinate XL, 50 mg, Oral, Daily  polyethylene glycol, 17 g, Oral, Daily  potassium chloride, 40 mEq, Oral, Daily  predniSONE, 5 mg, Oral, Daily With Breakfast  sodium chloride, 10 mL, Intravenous, Q12H  tacrolimus, 1 mg, Oral, BID      Continuous Infusions:furosemide, 40 mg/hr, Last Rate: 40 mg/hr  (05/30/24 1821)      PRN Meds:.  acetaminophen    Calcium Replacement - Follow Nurse / BPA Driven Protocol    carboxymethylcellulose sod    diazePAM    Diclofenac Sodium    hydrALAZINE    HYDROcodone-acetaminophen    loperamide    Magnesium Standard Dose Replacement - Follow Nurse / BPA Driven Protocol    nitroglycerin    ondansetron ODT **OR** ondansetron    Phosphorus Replacement - Follow Nurse / BPA Driven Protocol    Potassium Replacement - Follow Nurse / BPA Driven Protocol    [COMPLETED] Insert Peripheral IV **AND** sodium chloride    sodium chloride    sodium chloride     Assessment & Plan       Acute exacerbation of CHF (congestive heart failure)  - adding Jardiance, metolazone; monitor clinical fluid statu closely  Macrocytic anemia - hg is stable  Atrial fib - rate controlled; she is anticoagulated  Chronic CAD - no indication for further ischemic workul  Pulmonary htn  H/o kidney transplant - prednisone, Prograf        Plan for disposition:SNF when able    Juanis Lowe MD  05/30/24  20:17 EDT

## 2024-05-31 NOTE — PROGRESS NOTES
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  Atrial fibrillation with RV  Volume overload  Hx fall  Hypothyroidism    Hyperlipidemia  History of kidney transplant  Anemia  CAD  History of DVT     Pulmonary hypertension  HTN  UTI        PLAN:     Respiratory status is improving  Continue short course of dexamethasone  Nebulized bronchodilators  Nebulized corticosteroids  Continue Mucinex    oxygen supplement and titration to maintain saturation 90-95%: Currently requiring 4-6 L per nasal cannula  Encouraged use I-S flutter valve  Antibiotics: completed 5/28/2024    Diuresis and electrolyte management as per nephrology  Electrolytes/ glycemic control  BP/HR control  Thyroid hormone replacement  Chronic anticoagulation: Apixaban     I personally reviewed the radiological images               LOS: 7 days     Subjective     Patient reports less shortness of breath    Objective     Vital signs for last 24 hours:  Vitals:    05/31/24 0801 05/31/24 1100 05/31/24 1103 05/31/24 1141   BP: 110/47   114/54   BP Location: Left arm   Left arm   Patient Position: Sitting   Sitting   Pulse: 91 93 94 105   Resp: 22 16 16 14   Temp: 98.6 °F (37 °C)   97.7 °F (36.5 °C)   TempSrc: Oral   Oral   SpO2: 91% 90% 98% 91%   Weight:       Height:           Intake/Output last 3 shifts:  I/O last 3 completed shifts:  In: 50 [IV Piggyback:50]  Out: 9500 [Urine:9500]  Intake/Output this shift:  I/O this shift:  In: 240 [P.O.:240]  Out: 2100 [Urine:2100]      Radiology  Imaging Results (Last 24 Hours)       ** No results found for the last 24 hours. **            Labs:  Results from last 7 days   Lab Units 05/31/24  0538   WBC 10*3/mm3 7.21   HEMOGLOBIN g/dL 10.5*   HEMATOCRIT % 36.0   PLATELETS 10*3/mm3 179     Results from last 7 days   Lab Units 05/31/24  0538 05/25/24  0443 05/24/24  1210   SODIUM mmol/L 141   < > 137   POTASSIUM mmol/L  3.5   < > 4.0   CHLORIDE mmol/L 91*   < > 94*   CO2 mmol/L 37.9*   < > 32.9*   BUN mg/dL 17   < > 21   CREATININE mg/dL 0.99   < > 1.16*   CALCIUM mg/dL 10.7*   < > 10.1   BILIRUBIN mg/dL  --   --  1.2   ALK PHOS U/L  --   --  59   ALT (SGPT) U/L  --   --  9   AST (SGOT) U/L  --   --  16   GLUCOSE mg/dL 130*   < > 154*    < > = values in this interval not displayed.     Results from last 7 days   Lab Units 05/24/24  1225   PH, ARTERIAL pH units 7.442   PO2 ART mm Hg 56.7*   PCO2, ARTERIAL mm Hg 51.4*   HCO3 ART mmol/L 35.0*     Results from last 7 days   Lab Units 05/24/24  1210   ALBUMIN g/dL 3.6     Results from last 7 days   Lab Units 05/24/24  1408 05/24/24  1210   HSTROP T ng/L 35* 43*         Results from last 7 days   Lab Units 05/27/24  0526   MAGNESIUM mg/dL 1.9     Results from last 7 days   Lab Units 05/24/24  1210   INR  1.16*   APTT seconds 30.9*     Results from last 7 days   Lab Units 05/25/24  1110 05/24/24  1408   TSH uIU/mL 2.330  --    FREE T4 ng/dL  --  1.42           Meds:   SCHEDULE  apixaban, 5 mg, Oral, Q12H  budesonide, 0.5 mg, Nebulization, BID - RT  cholestyramine light, 1 packet, Oral, Daily  dexAMETHasone, 4 mg, Oral, Daily With Breakfast  dilTIAZem, 90 mg, Oral, Q8H  docusate sodium, 100 mg, Oral, BID  DULoxetine, 40 mg, Oral, Daily  empagliflozin, 25 mg, Oral, Daily  ferrous sulfate, 324 mg, Oral, Daily With Breakfast  guaiFENesin, 1,200 mg, Oral, BID  ipratropium-albuterol, 3 mL, Nebulization, 4x Daily - RT  levothyroxine, 50 mcg, Oral, Q AM  metoclopramide, 5 mg, Oral, 4x Daily AC & at Bedtime  metOLazone, 5 mg, Oral, Once per day on Monday Wednesday Friday  metoprolol succinate XL, 50 mg, Oral, Daily  polyethylene glycol, 17 g, Oral, Daily  potassium chloride, 40 mEq, Oral, Daily  potassium chloride, 40 mEq, Oral, Q4H  predniSONE, 5 mg, Oral, Daily With Breakfast  sodium chloride, 10 mL, Intravenous, Q12H  tacrolimus, 1 mg, Oral, BID      Infusions     PRNs    acetaminophen     Calcium Replacement - Follow Nurse / BPA Driven Protocol    carboxymethylcellulose sod    diazePAM    Diclofenac Sodium    hydrALAZINE    HYDROcodone-acetaminophen    loperamide    Magnesium Standard Dose Replacement - Follow Nurse / BPA Driven Protocol    nitroglycerin    ondansetron ODT **OR** ondansetron    Phosphorus Replacement - Follow Nurse / BPA Driven Protocol    Potassium Replacement - Follow Nurse / BPA Driven Protocol    [COMPLETED] Insert Peripheral IV **AND** sodium chloride    sodium chloride    sodium chloride    Physical Exam:  General Appearance:  Alert   HEENT:  Normocephalic, without obvious abnormality, Conjunctiva/corneas clear,.   Nares normal, no drainage     Neck:  Supple, symmetrical, trachea midline.   Lungs /Chest wall:   Improved air entry, mild bilateral basal rhonchi, respirations unlabored, symmetrical wall movement.     Heart:  Regular rate and rhythm, S1 S2 normal  Abdomen: Soft, non-tender, no masses, no organomegaly.    Extremities: Improving lower extremities edema, no clubbing or cyanosis, right upper extremity fistula in place    ROS  Constitutional: Negative for chills, fever and malaise/fatigue.   HENT: Negative.    Eyes: Negative.    Cardiovascular: Negative.    Respiratory: Positive for  shortness of breath.    Skin: Negative.    Musculoskeletal: Negative.    Gastrointestinal: Negative.    Genitourinary: Negative.    Neurological: Generalized weakness      I reviewed the recent clinical results  I personally reviewed the latest radiological studies    Part of this note may be an electronic transcription/translation of spoken language to printed text using the Dragon Dictation System.

## 2024-05-31 NOTE — TREATMENT PLAN
Subjective: Pt agreeable to therapeutic plan of care. States several times she is too weak for rehab and her doctors have told her she can stay here as long as she wants.     Objective:     Bed mobility - N/A or Not attempted. Up in chair on arrival   Transfers - CGA and with rolling walker  Ambulation - 150 feet CGA and with rolling walker    Therapeutic Exercise - 10 Reps Bilaterally AROM supported sitting / chair    Vitals: Desaturates On 4L/min drops as low as 84 with exertion and recovers within 1 minute requiring cues for box breathing      Education: Provided education on the importance of mobility in the acute care setting, Verbal/Tactile Cues, Transfer Training, Gait Training, and Energy conservation strategies Educated Pt that she is ready for rehab from a functional stand point and what to expect. Educated Pt and son on use of life alert of apple watch at home post rehab stay.     Assessment: Jaja Carcamo presents with functional mobility impairments which indicate the need for skilled intervention. Pt requires cues and encouragement throughout. Has high anxiety about mobility as well as going to rehab. Tolerating session today without incident. Will continue to follow and progress as tolerated.

## 2024-05-31 NOTE — CASE MANAGEMENT/SOCIAL WORK
Continued Stay Note  DAHIANA Gay     Patient Name: Jaja Carcamo  MRN: [id number]  Today's Date: 5/31/2024    Admit Date: 5/24/2024    Plan: [facility] accepted, precert approved 5/30-6/3. PASRR approved.   Discharge Plan       Row Name 05/31/24 1213       Plan    Plan [facility] accepted, precert approved 5/30-6/3. PASRR approved.    Patient/Family in Agreement with Plan yes    Plan Comments PEDRO Long confirmed precert approval, 5/30-6/3. Pt was started on Lasix gtt yesterday but has since stopped. Continues on 4L O2.             Megan Naegele, RN     Office Phone: [phone]  Office Cell: [phone]

## 2024-05-31 NOTE — PROGRESS NOTES
"RENAL/K[facility]:     LOS: 7 days    Patient Care Team:  Jonathan Luna APRN as PCP - General (Family Medicine)  Jak Gavin MD as Cardiologist (Cardiology)    Chief Complaint:  Fluid overload    Subjective     Interval History:   Chart reviewed  Excellent UOP with improving edema        Objective     Vital Sign Min/Max for last 24 hours  Temp  Min: 97.6 °F (36.4 °C)  Max: 99.3 °F (37.4 °C)   BP  Min: 110/47  Max: 143/74   Pulse  Min: 91  Max: 111   Resp  Min: 14  Max: 28   SpO2  Min: 90 %  Max: 100 %   Flow (L/min)  Min: 4  Max: 4   No data recorded     Flowsheet Rows      Flowsheet Row First Filed Value   Admission Height 167.6 cm (66\") Documented at 05/24/2024 1143   Admission Weight 86.2 kg (190 lb) Documented at 05/24/2024 1143            I/O this shift:  In: 240 [P.O.:240]  Out: 2100 [Urine:2100]  I/O last 3 completed shifts:  In: 50 [IV Piggyback:50]  Out: 9500 [Urine:9500]    Physical Exam:  GEN: Awake, NAD  ENT: PERRL, EOMI, MMM  NECK: Supple, no JVD  CHEST: CTAB, no W/R/C  CV: RRR, no M/G/R, +edema  ABD: Soft, NT, +BS  SKIN: Warm and Dry  NEURO: CN's intact      WBC WBC   Date Value Ref Range Status   05/31/2024 7.21 3.40 - 10.80 10*3/mm3 Final   05/30/2024 5.59 3.40 - 10.80 10*3/mm3 Final   05/29/2024 5.68 3.40 - 10.80 10*3/mm3 Final      HGB Hemoglobin   Date Value Ref Range Status   05/31/2024 10.5 (L) 12.0 - 15.9 g/dL Final   05/30/2024 9.0 (L) 12.0 - 15.9 g/dL Final   05/29/2024 8.8 (L) 12.0 - 15.9 g/dL Final      HCT Hematocrit   Date Value Ref Range Status   05/31/2024 36.0 34.0 - 46.6 % Final   05/30/2024 30.7 (L) 34.0 - 46.6 % Final   05/29/2024 30.6 (L) 34.0 - 46.6 % Final      Platlets No results found for: \"LABPLAT\"   MCV MCV   Date Value Ref Range Status   05/31/2024 97.3 (H) 79.0 - 97.0 fL Final   05/30/2024 100.3 (H) 79.0 - 97.0 fL Final   05/29/2024 101.0 (H) 79.0 - 97.0 fL Final          Sodium Sodium   Date Value Ref Range Status   05/31/2024 141 136 - 145 mmol/L Final   05/30/2024 140 136 - " "145 mmol/L Final   05/29/2024 141 136 - 145 mmol/L Final      Potassium Potassium   Date Value Ref Range Status   05/31/2024 3.5 3.5 - 5.2 mmol/L Final   05/30/2024 3.8 3.5 - 5.2 mmol/L Final   05/29/2024 4.3 3.5 - 5.2 mmol/L Final      Chloride Chloride   Date Value Ref Range Status   05/31/2024 91 (L) 98 - 107 mmol/L Final   05/30/2024 99 98 - 107 mmol/L Final   05/29/2024 101 98 - 107 mmol/L Final      CO2 CO2   Date Value Ref Range Status   05/31/2024 37.9 (H) 22.0 - 29.0 mmol/L Final   05/30/2024 33.6 (H) 22.0 - 29.0 mmol/L Final   05/29/2024 33.6 (H) 22.0 - 29.0 mmol/L Final      BUN BUN   Date Value Ref Range Status   05/31/2024 17 8 - 23 mg/dL Final   05/30/2024 15 8 - 23 mg/dL Final   05/29/2024 16 8 - 23 mg/dL Final      Creatinine Creatinine   Date Value Ref Range Status   05/31/2024 0.99 0.57 - 1.00 mg/dL Final   05/30/2024 0.75 0.57 - 1.00 mg/dL Final   05/29/2024 0.76 0.57 - 1.00 mg/dL Final      Calcium Calcium   Date Value Ref Range Status   05/31/2024 10.7 (H) 8.6 - 10.5 mg/dL Final   05/30/2024 9.8 8.6 - 10.5 mg/dL Final   05/29/2024 9.4 8.6 - 10.5 mg/dL Final      PO4 No results found for: \"CAPO4\"   Albumin No results found for: \"ALBUMIN\"   Magnesium No results found for: \"MG\"     Uric Acid No results found for: \"URICACID\"        Results Review:     I reviewed the patient's new clinical results.    acetaZOLAMIDE (DIAMOX) 500 mg in sterile water (preservative free) 5 mL (100 mg/mL) injection, 500 mg, Intravenous, Once  apixaban, 5 mg, Oral, Q12H  budesonide, 0.5 mg, Nebulization, BID - RT  cholestyramine light, 1 packet, Oral, Daily  dexAMETHasone, 4 mg, Oral, Daily With Breakfast  dilTIAZem, 90 mg, Oral, Q8H  docusate sodium, 100 mg, Oral, BID  DULoxetine, 40 mg, Oral, Daily  empagliflozin, 25 mg, Oral, Daily  ferrous sulfate, 324 mg, Oral, Daily With Breakfast  furosemide, 80 mg, Oral, BID  guaiFENesin, 1,200 mg, Oral, BID  ipratropium-albuterol, 3 mL, Nebulization, 4x Daily - RT  levothyroxine, " 50 mcg, Oral, Q AM  metoclopramide, 5 mg, Oral, 4x Daily AC & at Bedtime  metOLazone, 5 mg, Oral, Once per day on Monday Wednesday Friday  metoprolol succinate XL, 50 mg, Oral, Daily  polyethylene glycol, 17 g, Oral, Daily  potassium chloride, 40 mEq, Oral, Daily  potassium chloride, 40 mEq, Oral, Q4H  predniSONE, 5 mg, Oral, Daily With Breakfast  sodium chloride, 10 mL, Intravenous, Q12H  tacrolimus, 1 mg, Oral, BID             Medication Review: Reviewed    Assessment & Plan     TONYA    ESRD - s/p DDKT     Chronic immunoRx     Fluid overload/CHF exacerbation     Atrial fibrillation     History of COPD     History of lung cancer     Hypokalemia    Metabolic alkalosis     Plan:  Cr 0.9 with 9+ L UOP so far since yesterday  K replaced  Edema much improved  Transition back to BID PO Lasix and MWF Metolazone  Diamox x 1 today  On fluid restriction  OK with Jardiance from a renal standpoint  Will follow      Naun Falcon MD  [facility]  05/31/24  13:16 EDT

## 2024-05-31 NOTE — PLAN OF CARE
Goal Outcome Evaluation:  Jaja Carcamo presents with ADL impairments affecting function including balance, endurance / activity tolerance, and strength. Demonstrated functioning below baseline abilities indicate the need for continued skilled intervention while inpatient. Pt completed bed mobility with SBA.  Pt completed sit to stand transfer using RW with CGA.  Pt completed functional mobility to chair using RW with CGA.  Pt completed grooming tasks sitting in chair with Supervision.  Pt anxious the hospital is going to discharge her soon.Tolerating session today without incident. Will continue to follow and progress as tolerated.

## 2024-05-31 NOTE — PLAN OF CARE
Goal Outcome Evaluation:              Outcome Evaluation: Lasix drip continues.  No other issues this shift.  Will continue to monitor.

## 2024-06-01 LAB
ANION GAP SERPL CALCULATED.3IONS-SCNC: 13.7 MMOL/L (ref 5–15)
BASOPHILS # BLD AUTO: 0.01 10*3/MM3 (ref 0–0.2)
BASOPHILS NFR BLD AUTO: 0.1 % (ref 0–1.5)
BUN SERPL-MCNC: 30 MG/DL (ref 8–23)
BUN/CREAT SERPL: 28.3 (ref 7–25)
CALCIUM SPEC-SCNC: 10.6 MG/DL (ref 8.6–10.5)
CHLORIDE SERPL-SCNC: 92 MMOL/L (ref 98–107)
CO2 SERPL-SCNC: 33.3 MMOL/L (ref 22–29)
CREAT SERPL-MCNC: 1.06 MG/DL (ref 0.57–1)
DEPRECATED RDW RBC AUTO: 62.4 FL (ref 37–54)
EGFRCR SERPLBLD CKD-EPI 2021: 54.2 ML/MIN/1.73
EOSINOPHIL # BLD AUTO: 0 10*3/MM3 (ref 0–0.4)
EOSINOPHIL NFR BLD AUTO: 0 % (ref 0.3–6.2)
ERYTHROCYTE [DISTWIDTH] IN BLOOD BY AUTOMATED COUNT: 17.3 % (ref 12.3–15.4)
GLUCOSE SERPL-MCNC: 139 MG/DL (ref 65–99)
HCT VFR BLD AUTO: 35.9 % (ref 34–46.6)
HGB BLD-MCNC: 10.9 G/DL (ref 12–15.9)
IMM GRANULOCYTES # BLD AUTO: 0.06 10*3/MM3 (ref 0–0.05)
IMM GRANULOCYTES NFR BLD AUTO: 0.6 % (ref 0–0.5)
LYMPHOCYTES # BLD AUTO: 0.67 10*3/MM3 (ref 0.7–3.1)
LYMPHOCYTES NFR BLD AUTO: 6.5 % (ref 19.6–45.3)
MAGNESIUM SERPL-MCNC: 1.9 MG/DL (ref 1.6–2.4)
MCH RBC QN AUTO: 29.7 PG (ref 26.6–33)
MCHC RBC AUTO-ENTMCNC: 30.4 G/DL (ref 31.5–35.7)
MCV RBC AUTO: 97.8 FL (ref 79–97)
MONOCYTES # BLD AUTO: 0.73 10*3/MM3 (ref 0.1–0.9)
MONOCYTES NFR BLD AUTO: 7 % (ref 5–12)
NEUTROPHILS NFR BLD AUTO: 8.89 10*3/MM3 (ref 1.7–7)
NEUTROPHILS NFR BLD AUTO: 85.8 % (ref 42.7–76)
NRBC BLD AUTO-RTO: 0 /100 WBC (ref 0–0.2)
PLATELET # BLD AUTO: 253 10*3/MM3 (ref 140–450)
PMV BLD AUTO: 9.8 FL (ref 6–12)
POTASSIUM SERPL-SCNC: 3.5 MMOL/L (ref 3.5–5.2)
POTASSIUM SERPL-SCNC: 4.1 MMOL/L (ref 3.5–5.2)
RBC # BLD AUTO: 3.67 10*6/MM3 (ref 3.77–5.28)
SODIUM SERPL-SCNC: 139 MMOL/L (ref 136–145)
WBC NRBC COR # BLD AUTO: 10.36 10*3/MM3 (ref 3.4–10.8)

## 2024-06-01 PROCEDURE — 94799 UNLISTED PULMONARY SVC/PX: CPT

## 2024-06-01 PROCEDURE — 63710000001 PREDNISONE PER 5 MG: Performed by: INTERNAL MEDICINE

## 2024-06-01 PROCEDURE — 94664 DEMO&/EVAL PT USE INHALER: CPT

## 2024-06-01 PROCEDURE — 99232 SBSQ HOSP IP/OBS MODERATE 35: CPT | Performed by: INTERNAL MEDICINE

## 2024-06-01 PROCEDURE — 63710000001 TACROLIMUS PER 1 MG: Performed by: INTERNAL MEDICINE

## 2024-06-01 PROCEDURE — 63710000001 DEXAMETHASONE PER 0.25 MG: Performed by: INTERNAL MEDICINE

## 2024-06-01 PROCEDURE — 84132 ASSAY OF SERUM POTASSIUM: CPT | Performed by: INTERNAL MEDICINE

## 2024-06-01 PROCEDURE — 80048 BASIC METABOLIC PNL TOTAL CA: CPT | Performed by: INTERNAL MEDICINE

## 2024-06-01 PROCEDURE — 83735 ASSAY OF MAGNESIUM: CPT | Performed by: INTERNAL MEDICINE

## 2024-06-01 PROCEDURE — 85025 COMPLETE CBC W/AUTO DIFF WBC: CPT | Performed by: INTERNAL MEDICINE

## 2024-06-01 RX ORDER — POTASSIUM CHLORIDE 20 MEQ/1
40 TABLET, EXTENDED RELEASE ORAL EVERY 4 HOURS
Status: COMPLETED | OUTPATIENT
Start: 2024-06-01 | End: 2024-06-01

## 2024-06-01 RX ORDER — FUROSEMIDE 40 MG/1
80 TABLET ORAL
Status: DISCONTINUED | OUTPATIENT
Start: 2024-06-02 | End: 2024-06-03 | Stop reason: HOSPADM

## 2024-06-01 RX ADMIN — METOCLOPRAMIDE 5 MG: 5 TABLET ORAL at 20:20

## 2024-06-01 RX ADMIN — Medication 10 ML: at 20:21

## 2024-06-01 RX ADMIN — METOCLOPRAMIDE 5 MG: 5 TABLET ORAL at 17:01

## 2024-06-01 RX ADMIN — METOCLOPRAMIDE 5 MG: 5 TABLET ORAL at 07:39

## 2024-06-01 RX ADMIN — GUAIFENESIN 1200 MG: 600 TABLET, EXTENDED RELEASE ORAL at 20:20

## 2024-06-01 RX ADMIN — EMPAGLIFLOZIN 25 MG: 25 TABLET, FILM COATED ORAL at 08:50

## 2024-06-01 RX ADMIN — POTASSIUM CHLORIDE 40 MEQ: 1500 TABLET, EXTENDED RELEASE ORAL at 08:50

## 2024-06-01 RX ADMIN — TACROLIMUS 1 MG: 1 CAPSULE ORAL at 08:49

## 2024-06-01 RX ADMIN — POTASSIUM CHLORIDE 40 MEQ: 1500 TABLET, EXTENDED RELEASE ORAL at 14:03

## 2024-06-01 RX ADMIN — CHOLESTYRAMINE 4 G: 4 POWDER, FOR SUSPENSION ORAL at 08:49

## 2024-06-01 RX ADMIN — METOCLOPRAMIDE 5 MG: 5 TABLET ORAL at 10:48

## 2024-06-01 RX ADMIN — FERROUS SULFATE TAB EC 324 MG (65 MG FE EQUIVALENT) 324 MG: 324 (65 FE) TABLET DELAYED RESPONSE at 07:39

## 2024-06-01 RX ADMIN — APIXABAN 5 MG: 5 TABLET, FILM COATED ORAL at 20:20

## 2024-06-01 RX ADMIN — PREDNISONE 5 MG: 5 TABLET ORAL at 07:39

## 2024-06-01 RX ADMIN — DOCUSATE SODIUM 100 MG: 100 CAPSULE, LIQUID FILLED ORAL at 20:20

## 2024-06-01 RX ADMIN — POTASSIUM CHLORIDE 40 MEQ: 1500 TABLET, EXTENDED RELEASE ORAL at 10:48

## 2024-06-01 RX ADMIN — DILTIAZEM HYDROCHLORIDE 90 MG: 60 TABLET, FILM COATED ORAL at 07:38

## 2024-06-01 RX ADMIN — DEXAMETHASONE 4 MG: 4 TABLET ORAL at 07:39

## 2024-06-01 RX ADMIN — DILTIAZEM HYDROCHLORIDE 90 MG: 60 TABLET, FILM COATED ORAL at 14:03

## 2024-06-01 RX ADMIN — IPRATROPIUM BROMIDE AND ALBUTEROL SULFATE 3 ML: .5; 3 SOLUTION RESPIRATORY (INHALATION) at 19:23

## 2024-06-01 RX ADMIN — Medication 10 ML: at 08:49

## 2024-06-01 RX ADMIN — GUAIFENESIN 1200 MG: 600 TABLET, EXTENDED RELEASE ORAL at 08:50

## 2024-06-01 RX ADMIN — DILTIAZEM HYDROCHLORIDE 90 MG: 60 TABLET, FILM COATED ORAL at 21:02

## 2024-06-01 RX ADMIN — METOPROLOL SUCCINATE 50 MG: 50 TABLET, EXTENDED RELEASE ORAL at 08:50

## 2024-06-01 RX ADMIN — IPRATROPIUM BROMIDE AND ALBUTEROL SULFATE 3 ML: .5; 3 SOLUTION RESPIRATORY (INHALATION) at 11:53

## 2024-06-01 RX ADMIN — TACROLIMUS 1 MG: 1 CAPSULE ORAL at 20:20

## 2024-06-01 RX ADMIN — LEVOTHYROXINE SODIUM 50 MCG: 0.05 TABLET ORAL at 07:39

## 2024-06-01 RX ADMIN — APIXABAN 5 MG: 5 TABLET, FILM COATED ORAL at 08:49

## 2024-06-01 RX ADMIN — DULOXETINE HYDROCHLORIDE 40 MG: 20 CAPSULE, DELAYED RELEASE ORAL at 08:49

## 2024-06-01 RX ADMIN — FUROSEMIDE 80 MG: 40 TABLET ORAL at 08:50

## 2024-06-01 RX ADMIN — BUDESONIDE 0.5 MG: 0.5 INHALANT RESPIRATORY (INHALATION) at 19:27

## 2024-06-01 NOTE — PROGRESS NOTES
"RENAL/KCC:     LOS: 8 days    Patient Care Team:  Jonathan Luna APRN as PCP - General (Family Medicine)  Jak Gavin MD as Cardiologist (Cardiology)    Chief Complaint:  Fluid overload    Subjective     Interval History:   Chart reviewed  Excellent UOP with improving edema      Objective     Vital Sign Min/Max for last 24 hours  Temp  Min: 97.3 °F (36.3 °C)  Max: 97.7 °F (36.5 °C)   BP  Min: 114/54  Max: 129/50   Pulse  Min: 84  Max: 105   Resp  Min: 14  Max: 22   SpO2  Min: 90 %  Max: 100 %   Flow (L/min)  Min: 4  Max: 4   Weight  Min: 72.2 kg (159 lb 2.8 oz)  Max: 72.2 kg (159 lb 2.8 oz)     Flowsheet Rows      Flowsheet Row First Filed Value   Admission Height 167.6 cm (66\") Documented at 05/24/2024 1143   Admission Weight 86.2 kg (190 lb) Documented at 05/24/2024 1143            No intake/output data recorded.  I/O last 3 completed shifts:  In: 970 [P.O.:920; IV Piggyback:50]  Out: 73421 [Urine:73925]    Physical Exam:  GEN: Awake, NAD  ENT: PERRL, EOMI, MMM  NECK: Supple, no JVD  CHEST: CTAB, no W/R/C  CV: RRR, no M/G/R, +edema  ABD: Soft, NT, +BS  SKIN: Warm and Dry  NEURO: CN's intact      WBC WBC   Date Value Ref Range Status   06/01/2024 10.36 3.40 - 10.80 10*3/mm3 Final   05/31/2024 7.21 3.40 - 10.80 10*3/mm3 Final   05/30/2024 5.59 3.40 - 10.80 10*3/mm3 Final      HGB Hemoglobin   Date Value Ref Range Status   06/01/2024 10.9 (L) 12.0 - 15.9 g/dL Final   05/31/2024 10.5 (L) 12.0 - 15.9 g/dL Final   05/30/2024 9.0 (L) 12.0 - 15.9 g/dL Final      HCT Hematocrit   Date Value Ref Range Status   06/01/2024 35.9 34.0 - 46.6 % Final   05/31/2024 36.0 34.0 - 46.6 % Final   05/30/2024 30.7 (L) 34.0 - 46.6 % Final      Platlets No results found for: \"LABPLAT\"   MCV MCV   Date Value Ref Range Status   06/01/2024 97.8 (H) 79.0 - 97.0 fL Final   05/31/2024 97.3 (H) 79.0 - 97.0 fL Final   05/30/2024 100.3 (H) 79.0 - 97.0 fL Final          Sodium Sodium   Date Value Ref Range Status   05/31/2024 141 136 - 145 " "mmol/L Final   05/30/2024 140 136 - 145 mmol/L Final      Potassium Potassium   Date Value Ref Range Status   05/31/2024 3.6 3.5 - 5.2 mmol/L Final   05/31/2024 3.5 3.5 - 5.2 mmol/L Final   05/30/2024 3.8 3.5 - 5.2 mmol/L Final      Chloride Chloride   Date Value Ref Range Status   05/31/2024 91 (L) 98 - 107 mmol/L Final   05/30/2024 99 98 - 107 mmol/L Final      CO2 CO2   Date Value Ref Range Status   05/31/2024 37.9 (H) 22.0 - 29.0 mmol/L Final   05/30/2024 33.6 (H) 22.0 - 29.0 mmol/L Final      BUN BUN   Date Value Ref Range Status   05/31/2024 17 8 - 23 mg/dL Final   05/30/2024 15 8 - 23 mg/dL Final      Creatinine Creatinine   Date Value Ref Range Status   05/31/2024 0.99 0.57 - 1.00 mg/dL Final   05/30/2024 0.75 0.57 - 1.00 mg/dL Final      Calcium Calcium   Date Value Ref Range Status   05/31/2024 10.7 (H) 8.6 - 10.5 mg/dL Final   05/30/2024 9.8 8.6 - 10.5 mg/dL Final      PO4 No results found for: \"CAPO4\"   Albumin No results found for: \"ALBUMIN\"   Magnesium No results found for: \"MG\"     Uric Acid No results found for: \"URICACID\"        Results Review:     I reviewed the patient's new clinical results.    apixaban, 5 mg, Oral, Q12H  budesonide, 0.5 mg, Nebulization, BID - RT  cholestyramine light, 1 packet, Oral, Daily  dexAMETHasone, 4 mg, Oral, Daily With Breakfast  dilTIAZem, 90 mg, Oral, Q8H  docusate sodium, 100 mg, Oral, BID  DULoxetine, 40 mg, Oral, Daily  empagliflozin, 25 mg, Oral, Daily  ferrous sulfate, 324 mg, Oral, Daily With Breakfast  furosemide, 80 mg, Oral, BID  guaiFENesin, 1,200 mg, Oral, BID  ipratropium-albuterol, 3 mL, Nebulization, 4x Daily - RT  levothyroxine, 50 mcg, Oral, Q AM  metoclopramide, 5 mg, Oral, 4x Daily AC & at Bedtime  metOLazone, 5 mg, Oral, Once per day on Monday Wednesday Friday  metoprolol succinate XL, 50 mg, Oral, Daily  polyethylene glycol, 17 g, Oral, Daily  potassium chloride, 40 mEq, Oral, Daily  predniSONE, 5 mg, Oral, Daily With Breakfast  sodium chloride, " 10 mL, Intravenous, Q12H  tacrolimus, 1 mg, Oral, BID             Medication Review: Reviewed    Assessment & Plan     TONYA    ESRD - s/p DDKT     Chronic immunoRx     Fluid overload/CHF exacerbation     Atrial fibrillation     History of COPD     History of lung cancer     Hypokalemia    Metabolic alkalosis     Plan:  Cr up slightly at 1.06 with 13+ L UOP the oast two days  K replaced  Edema much improved  Transitioned back to BID PO Lasix and MWF Metolazone  Diamox x 1 5/31  On fluid restriction  OK with Jardiance from a renal standpoint  Will follow      Naun Falcon MD  [facility]  06/01/24  08:23 EDT

## 2024-06-01 NOTE — PROGRESS NOTES
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  Atrial fibrillation with RV  Volume overload  Hx fall  Hypothyroidism    Hyperlipidemia  History of kidney transplant  Anemia  CAD  History of DVT     Pulmonary hypertension  HTN  UTI        PLAN:     Respiratory status is improving  Continue short course of dexamethasone until 6/3/2024  Nebulized bronchodilators  Nebulized corticosteroids  Continue Mucinex    oxygen supplement and titration to maintain saturation 90-95%: Currently requiring 4 L per nasal cannula  Encouraged use I-S flutter valve  Antibiotics: completed 5/28/2024    Diuresis and electrolyte management as per nephrology  Electrolytes/ glycemic control  BP/HR control  Thyroid hormone replacement  Chronic anticoagulation: Apixaban     I personally reviewed the radiological images               LOS: 8 days     Subjective     Patient reports less shortness of breath    Objective     Vital signs for last 24 hours:  Vitals:    06/01/24 0017 06/01/24 0428 06/01/24 0644 06/01/24 0741   BP: 118/52 117/62  129/50   BP Location: Left arm Left arm  Left arm   Patient Position: Lying Lying  Lying   Pulse: 91 84  89   Resp: 18 16  18   Temp: 97.3 °F (36.3 °C) 97.3 °F (36.3 °C)     TempSrc: Oral Oral     SpO2: 95% 93%  92%   Weight:   72.2 kg (159 lb 2.8 oz)    Height:           Intake/Output last 3 shifts:  I/O last 3 completed shifts:  In: 970 [P.O.:920; IV Piggyback:50]  Out: 37357 [Urine:53755]  Intake/Output this shift:  I/O this shift:  In: 360 [P.O.:360]  Out: 600 [Urine:600]      Radiology  Imaging Results (Last 24 Hours)       Procedure Component Value Units Date/Time    XR Chest 1 View [[id number]] Collected: 05/31/24 1248     Updated: 05/31/24 1251    Narrative:      XR CHEST 1 VW    Date of Exam: 5/31/2024 12:31 PM EDT    Indication: Continued dyspnea, CHF    Comparison: 5/24/2024.    Findings:  There is  stable mild to moderate cardiomegaly. There is pulmonary vascular congestion. There is a prominent interstitial pattern which appears similar. There are continued small pleural effusions. Lung fields remain hyperinflated.      Impression:      Impression:  Findings suggest CHF with mild pulmonary edema and small effusions although a component of underlying chronic lung disease is thought to be present as well.      Electronically Signed: Danna Carrera MD    5/31/2024 12:49 PM EDT    Workstation ID: [id number]            Labs:  Results from last 7 days   Lab Units 06/01/24  0750   WBC 10*3/mm3 10.36   HEMOGLOBIN g/dL 10.9*   HEMATOCRIT % 35.9   PLATELETS 10*3/mm3 253     Results from last 7 days   Lab Units 06/01/24  0750   SODIUM mmol/L 139   POTASSIUM mmol/L 3.5   CHLORIDE mmol/L 92*   CO2 mmol/L 33.3*   BUN mg/dL 30*   CREATININE mg/dL 1.06*   CALCIUM mg/dL 10.6*   GLUCOSE mg/dL 139*                           Results from last 7 days   Lab Units 06/01/24  0750   MAGNESIUM mg/dL 1.9           Results from last 7 days   Lab Units 05/25/24  1110   TSH uIU/mL 2.330           Meds:   SCHEDULE  apixaban, 5 mg, Oral, Q12H  budesonide, 0.5 mg, Nebulization, BID - RT  cholestyramine light, 1 packet, Oral, Daily  dexAMETHasone, 4 mg, Oral, Daily With Breakfast  dilTIAZem, 90 mg, Oral, Q8H  docusate sodium, 100 mg, Oral, BID  DULoxetine, 40 mg, Oral, Daily  empagliflozin, 25 mg, Oral, Daily  ferrous sulfate, 324 mg, Oral, Daily With Breakfast  [START ON 6/2/2024] furosemide, 80 mg, Oral, BID  guaiFENesin, 1,200 mg, Oral, BID  ipratropium-albuterol, 3 mL, Nebulization, 4x Daily - RT  levothyroxine, 50 mcg, Oral, Q AM  metoclopramide, 5 mg, Oral, 4x Daily AC & at Bedtime  metOLazone, 5 mg, Oral, Once per day on Monday Wednesday Friday  metoprolol succinate XL, 50 mg, Oral, Daily  polyethylene glycol, 17 g, Oral, Daily  potassium chloride, 40 mEq, Oral, Daily  potassium chloride ER, 40 mEq, Oral, Q4H  predniSONE, 5 mg, Oral,  Daily With Breakfast  sodium chloride, 10 mL, Intravenous, Q12H  tacrolimus, 1 mg, Oral, BID      Infusions     PRNs    acetaminophen    Calcium Replacement - Follow Nurse / BPA Driven Protocol    carboxymethylcellulose sod    diazePAM    Diclofenac Sodium    hydrALAZINE    HYDROcodone-acetaminophen    loperamide    Magnesium Standard Dose Replacement - Follow Nurse / BPA Driven Protocol    nitroglycerin    ondansetron ODT **OR** ondansetron    Phosphorus Replacement - Follow Nurse / BPA Driven Protocol    Potassium Replacement - Follow Nurse / BPA Driven Protocol    [COMPLETED] Insert Peripheral IV **AND** sodium chloride    sodium chloride    sodium chloride    Physical Exam:  General Appearance:  Alert   HEENT:  Normocephalic, without obvious abnormality, Conjunctiva/corneas clear,.   Nares normal, no drainage     Neck:  Supple, symmetrical, trachea midline.   Lungs /Chest wall:   Improved air entry, mild bilateral basal rhonchi, respirations unlabored, symmetrical wall movement.     Heart:  Regular rate and rhythm, S1 S2 normal  Abdomen: Soft, non-tender, no masses, no organomegaly.    Extremities: Improving lower extremities edema, no clubbing or cyanosis, right upper extremity fistula in place    ROS  Constitutional: Negative for chills, fever and malaise/fatigue.   HENT: Negative.    Eyes: Negative.    Cardiovascular: Negative.    Respiratory: Positive for  shortness of breath.    Skin: Negative.    Musculoskeletal: Negative.    Gastrointestinal: Negative.    Genitourinary: Negative.    Neurological: Generalized weakness      I reviewed the recent clinical results  I personally reviewed the latest radiological studies    Part of this note may be an electronic transcription/translation of spoken language to printed text using the Dragon Dictation System.

## 2024-06-01 NOTE — PLAN OF CARE
Continue to monitor and assess pain.  Patient is in her room with no complaints.   Problem: Adult Inpatient Plan of Care  Goal: Plan of Care Review  Outcome: Ongoing, Progressing  Flowsheets  Taken 5/31/2024 0352 by Herson Mcgill LPN  Outcome Evaluation: Lasix drip continues.  No other issues this shift.  Will continue to monitor.  Taken 5/28/2024 1109 by Annalee Sears RN  Progress: improving  Taken 5/28/2024 0235 by Alicja Hodge RN  Plan of Care Reviewed With: patient  Goal: Patient-Specific Goal (Individualized)  Outcome: Ongoing, Progressing  Goal: Absence of Hospital-Acquired Illness or Injury  Outcome: Ongoing, Progressing  Intervention: Identify and Manage Fall Risk  Flowsheets (Taken 6/1/2024 1200 by Myla Manriquez RN)  Safety Promotion/Fall Prevention:   safety round/check completed   room organization consistent   nonskid shoes/slippers when out of bed   lighting adjusted   fall prevention program maintained   clutter free environment maintained   assistive device/personal items within reach  Intervention: Prevent Skin Injury  Flowsheets  Taken 6/1/2024 1200 by Myla Manriquez RN  Body Position: position changed independently  Taken 6/1/2024 0741 by Myla Manriquez RN  Skin Protection: adhesive use limited  Intervention: Prevent and Manage VTE (Venous Thromboembolism) Risk  Flowsheets  Taken 6/1/2024 1200 by Myla Manriquez RN  VTE Prevention/Management:   sequential compression devices off   patient refused intervention  Taken 6/1/2024 1000 by Myla Manriquez RN  Activity Management: up to bedside commode  Taken 6/1/2024 0741 by Myla Manriquez RN  Range of Motion: active ROM (range of motion) encouraged  Intervention: Prevent Infection  Flowsheets (Taken 6/1/2024 1200 by Myla Manriquez RN)  Infection Prevention:   single patient room provided   rest/sleep promoted   hand hygiene promoted   environmental surveillance performed  Goal: Optimal Comfort and  Wellbeing  Outcome: Ongoing, Progressing  Intervention: Monitor Pain and Promote Comfort  Flowsheets (Taken 5/31/2024 2244 by Herson Mcgill LPN)  Pain Management Interventions: see MAR  Intervention: Provide Person-Centered Care  Flowsheets (Taken 6/1/2024 1200 by Myla Manriquez, RN)  Trust Relationship/Rapport:   care explained   questions answered  Goal: Readiness for Transition of Care  Outcome: Ongoing, Progressing  Intervention: Mutually Develop Transition Plan  Flowsheets  Taken 5/29/2024 1011 by Justus Ortega LPN  Discharge Facility/Level of Care Needs:   nursing facility, skilled   home with home health  Equipment Needed After Discharge: none  Equipment Currently Used at Home:   walker, rolling   oxygen   pulse ox  Transportation Anticipated: family or friend will provide  Outpatient/Agency/Support Group Needs:   skilled nursing facility   homecare agency  Transportation Concerns: no car  Current Discharge Risk: physical impairment  Concerns to be Addressed: discharge planning  Readmission Within the Last 30 Days: current reason for admission unrelated to previous admission  Patient/Family Anticipated Services at Transition: none  Patient/Family Anticipates Transition to: home with family  Taken 5/28/2024 0137 by Alicja Hodge, RN  Anticipated Changes Related to Illness: inability to care for self  Taken 5/25/2024 1601 by Elena Lechuga  Concerns Comments: Lives alone, becoming less independent     Problem: Asthma Comorbidity  Goal: Maintenance of Asthma Control  Outcome: Ongoing, Progressing  Intervention: Maintain Asthma Symptom Control  Flowsheets (Taken 6/1/2024 1000 by Myla Manriquez, RN)  Medication Review/Management: medications reviewed     Problem: Behavioral Health Comorbidity  Goal: Maintenance of Behavioral Health Symptom Control  Outcome: Ongoing, Progressing  Intervention: Maintain Behavioral Health Symptom Control  Flowsheets (Taken 6/1/2024 1000 by Myla Manriquez,  RN)  Medication Review/Management: medications reviewed     Problem: COPD (Chronic Obstructive Pulmonary Disease) Comorbidity  Goal: Maintenance of COPD Symptom Control  Outcome: Ongoing, Progressing  Intervention: Maintain COPD-Symptom Control  Flowsheets  Taken 6/1/2024 1200 by Myla Manriquez RN  Supportive Measures: active listening utilized  Taken 6/1/2024 1000 by Myla Manriquez RN  Medication Review/Management: medications reviewed     Problem: Diabetes Comorbidity  Goal: Blood Glucose Level Within Targeted Range  Outcome: Ongoing, Progressing  Intervention: Monitor and Manage Glycemia  Flowsheets (Taken 6/1/2024 1255)  Glycemic Management: blood glucose monitored     Problem: Heart Failure Comorbidity  Goal: Maintenance of Heart Failure Symptom Control  Outcome: Ongoing, Progressing  Intervention: Maintain Heart Failure-Management  Flowsheets (Taken 6/1/2024 1000 by Myla Manriquez RN)  Medication Review/Management: medications reviewed     Problem: Hypertension Comorbidity  Goal: Blood Pressure in Desired Range  Outcome: Ongoing, Progressing  Intervention: Maintain Blood Pressure Management  Flowsheets  Taken 6/1/2024 1000 by Myla Manriquez RN  Medication Review/Management: medications reviewed  Taken 5/28/2024 0830 by Annalee Sears RN  Syncope Management: legs elevated     Problem: Obstructive Sleep Apnea Risk or Actual Comorbidity Management  Goal: Unobstructed Breathing During Sleep  Outcome: Ongoing, Progressing     Problem: Osteoarthritis Comorbidity  Goal: Maintenance of Osteoarthritis Symptom Control  Outcome: Ongoing, Progressing  Intervention: Maintain Osteoarthritis Symptom Control  Flowsheets  Taken 6/1/2024 1000 by Myla Manriquez RN  Activity Management: up to bedside commode  Medication Review/Management: medications reviewed  Taken 6/1/2024 0747 by Jannette Mar PCT  Assistive Device Utilized: gait belt     Problem: Pain Chronic (Persistent) (Comorbidity  Management)  Goal: Acceptable Pain Control and Functional Ability  Outcome: Ongoing, Progressing  Intervention: Manage Persistent Pain  Flowsheets  Taken 6/1/2024 1000 by Myla Manriquez RN  Medication Review/Management: medications reviewed  Taken 5/31/2024 2025 by Herson Mcgill LPN  Sleep/Rest Enhancement: awakenings minimized  Taken 5/29/2024 2046 by Sary Negrete LPN  Bowel Elimination Promotion: adequate fluid intake promoted  Intervention: Develop Pain Management Plan  Flowsheets (Taken 5/31/2024 2244 by Herson Mcgill LPN)  Pain Management Interventions: see MAR  Intervention: Optimize Psychosocial Wellbeing  Flowsheets  Taken 6/1/2024 1200 by Myla Manriquez RN  Supportive Measures: active listening utilized  Diversional Activities: television  Taken 5/29/2024 2046 by Sary Negrete LPN  Family/Support System Care: support provided     Problem: Seizure Disorder Comorbidity  Goal: Maintenance of Seizure Control  Outcome: Ongoing, Progressing  Intervention: Maintain Seizure-Symptom Control  Flowsheets (Taken 5/28/2024 0830 by Annalee Sears RN)  Seizure Precautions: activity supervised     Problem: Skin Injury Risk Increased  Goal: Skin Health and Integrity  Outcome: Ongoing, Progressing  Intervention: Promote and Optimize Oral Intake  Flowsheets (Taken 6/1/2024 1255)  Oral Nutrition Promotion:   calorie-dense foods provided   calorie-dense liquids provided  Intervention: Optimize Skin Protection  Flowsheets  Taken 6/1/2024 1200 by Myla Manriquez RN  Head of Bed (HOB) Positioning: HOB elevated  Taken 6/1/2024 0741 by Myla Manriquez RN  Pressure Reduction Techniques: frequent weight shift encouraged  Pressure Reduction Devices: pressure-redistributing mattress utilized  Skin Protection: adhesive use limited     Problem: Fall Injury Risk  Goal: Absence of Fall and Fall-Related Injury  Outcome: Ongoing, Progressing  Intervention: Identify and Manage Contributors  Flowsheets  Taken 6/1/2024  1000 by Myla Manriquez RN  Medication Review/Management: medications reviewed  Taken 5/27/2024 2006 by Alicja Hodge RN  Self-Care Promotion:   independence encouraged   BADL personal objects within reach  Intervention: Promote Injury-Free Environment  Flowsheets (Taken 6/1/2024 1200 by Myla Manriquez, RN)  Safety Promotion/Fall Prevention:   safety round/check completed   room organization consistent   nonskid shoes/slippers when out of bed   lighting adjusted   fall prevention program maintained   clutter free environment maintained   assistive device/personal items within reach  Goal: Absence of Fall and Fall-Related Injury  Outcome: Ongoing, Progressing  Intervention: Identify and Manage Contributors  Flowsheets  Taken 6/1/2024 1000 by Myla Manriquez RN  Medication Review/Management: medications reviewed  Taken 5/27/2024 2006 by Alicja Hodge, RN  Self-Care Promotion:   independence encouraged   BADL personal objects within reach  Intervention: Promote Injury-Free Environment  Flowsheets (Taken 6/1/2024 1200 by Myla Manriquez, RN)  Safety Promotion/Fall Prevention:   safety round/check completed   room organization consistent   nonskid shoes/slippers when out of bed   lighting adjusted   fall prevention program maintained   clutter free environment maintained   assistive device/personal items within reach   Goal Outcome Evaluation:

## 2024-06-01 NOTE — PROGRESS NOTES
LOS: 8 days   Patient Care Team:  Jonathan Luna APRN as PCP - General (Family Medicine)  Jak Gavin MD as Cardiologist (Cardiology)    Subjective:  Improvement noted    Objective:   Afebrile      Review of Systems:   Review of Systems   Constitutional:  Positive for activity change.           Vital Signs  Temp:  [97.3 °F (36.3 °C)-97.7 °F (36.5 °C)] 97.3 °F (36.3 °C)  Heart Rate:  [] 89  Resp:  [14-22] 18  BP: (114-129)/(50-64) 129/50    Physical Exam:  Physical Exam  Constitutional:       Appearance: Normal appearance.   Cardiovascular:      Rate and Rhythm: Regular rhythm.      Heart sounds: Normal heart sounds.   Pulmonary:      Breath sounds: Normal breath sounds.   Skin:     General: Skin is warm.   Neurological:      Mental Status: She is alert.          Radiology:  XR Chest 1 View    Result Date: 5/31/2024  Impression: Findings suggest CHF with mild pulmonary edema and small effusions although a component of underlying chronic lung disease is thought to be present as well. Electronically Signed: Danna Carrera MD  5/31/2024 12:49 PM EDT  Workstation ID: [id number]    XR Chest 1 View    Result Date: 5/24/2024  Impression: 1. Cardiomegaly with pulmonary vascular congestion and small pleural effusions 2. Probable COPD with left basilar atelectasis Electronically Signed: Ricardo Chavarria  5/24/2024 12:38 PM EDT  Workstation ID: [id number]        Results Review:     I reviewed the patient's new clinical results.  I reviewed the patient's new imaging results and agree with the interpretation.    Medication Review:   Scheduled Meds:apixaban, 5 mg, Oral, Q12H  budesonide, 0.5 mg, Nebulization, BID - RT  cholestyramine light, 1 packet, Oral, Daily  dexAMETHasone, 4 mg, Oral, Daily With Breakfast  dilTIAZem, 90 mg, Oral, Q8H  docusate sodium, 100 mg, Oral, BID  DULoxetine, 40 mg, Oral, Daily  empagliflozin, 25 mg, Oral, Daily  ferrous sulfate, 324 mg, Oral, Daily With Breakfast  [START ON 6/2/2024]  furosemide, 80 mg, Oral, BID  guaiFENesin, 1,200 mg, Oral, BID  ipratropium-albuterol, 3 mL, Nebulization, 4x Daily - RT  levothyroxine, 50 mcg, Oral, Q AM  metoclopramide, 5 mg, Oral, 4x Daily AC & at Bedtime  metOLazone, 5 mg, Oral, Once per day on Monday Wednesday Friday  metoprolol succinate XL, 50 mg, Oral, Daily  polyethylene glycol, 17 g, Oral, Daily  potassium chloride, 40 mEq, Oral, Daily  potassium chloride ER, 40 mEq, Oral, Q4H  predniSONE, 5 mg, Oral, Daily With Breakfast  sodium chloride, 10 mL, Intravenous, Q12H  tacrolimus, 1 mg, Oral, BID      Continuous Infusions:   PRN Meds:.  acetaminophen    Calcium Replacement - Follow Nurse / BPA Driven Protocol    carboxymethylcellulose sod    diazePAM    Diclofenac Sodium    hydrALAZINE    HYDROcodone-acetaminophen    loperamide    Magnesium Standard Dose Replacement - Follow Nurse / BPA Driven Protocol    nitroglycerin    ondansetron ODT **OR** ondansetron    Phosphorus Replacement - Follow Nurse / BPA Driven Protocol    Potassium Replacement - Follow Nurse / BPA Driven Protocol    [COMPLETED] Insert Peripheral IV **AND** sodium chloride    sodium chloride    sodium chloride    Labs:    CBC    Results from last 7 days   Lab Units 06/01/24  0750 05/31/24  0538 05/30/24  0503 05/29/24  0446 05/28/24  0450 05/27/24  0526 05/26/24  0404   WBC 10*3/mm3 10.36 7.21 5.59 5.68 6.01 6.34 5.25   HEMOGLOBIN g/dL 10.9* 10.5* 9.0* 8.8* 9.4* 10.2* 8.9*   PLATELETS 10*3/mm3 253 179 149 152 161 168 143     BMP   Results from last 7 days   Lab Units 06/01/24  0750 05/31/24  2048 05/31/24  0538 05/30/24  0503 05/29/24  0446 05/28/24  0450 05/27/24  0526 05/26/24  0404   SODIUM mmol/L 139  --  141 140 141 141 140 142   POTASSIUM mmol/L 3.5 3.6 3.5 3.8 4.3 4.6 4.3 4.2   CHLORIDE mmol/L 92*  --  91* 99 101 99 97* 98   CO2 mmol/L 33.3*  --  37.9* 33.6* 33.6* 35.9* 34.6* 36.3*   BUN mg/dL 30*  --  17 15 16 19 20 25*   CREATININE mg/dL 1.06*  --  0.99 0.75 0.76 0.84 0.89 1.10*  "  GLUCOSE mg/dL 139*  --  130* 91 94 98 105* 91   MAGNESIUM mg/dL 1.9  --   --   --   --   --  1.9 1.8     Cr Clearance Estimated Creatinine Clearance: 45.3 mL/min (A) (by C-G formula based on SCr of 1.06 mg/dL (H)).  Coag     HbA1C No results found for: \"HGBA1C\"  Blood Glucose No results found for: \"POCGLU\"  Infection     CMP   Results from last 7 days   Lab Units 06/01/24  0750 05/31/24 2048 05/31/24  0538 05/30/24  0503 05/29/24  0446 05/28/24  0450 05/27/24  0526 05/26/24  0404   SODIUM mmol/L 139  --  141 140 141 141 140 142   POTASSIUM mmol/L 3.5 3.6 3.5 3.8 4.3 4.6 4.3 4.2   CHLORIDE mmol/L 92*  --  91* 99 101 99 97* 98   CO2 mmol/L 33.3*  --  37.9* 33.6* 33.6* 35.9* 34.6* 36.3*   BUN mg/dL 30*  --  17 15 16 19 20 25*   CREATININE mg/dL 1.06*  --  0.99 0.75 0.76 0.84 0.89 1.10*   GLUCOSE mg/dL 139*  --  130* 91 94 98 105* 91     UA      Radiology(recent) XR Chest 1 View    Result Date: 5/31/2024  Impression: Findings suggest CHF with mild pulmonary edema and small effusions although a component of underlying chronic lung disease is thought to be present as well. Electronically Signed: Danna Carrera MD  5/31/2024 12:49 PM EDT  Workstation ID: [id number]    Assessment:       SOB  Acute exacerbation of diastolic congestive heart failure  ESRD  Hypertension with ESRD  ALISE  Anemia with ESRD  Paroxysmal atrial fibrillation  Hypercoagulable state secondary to atrial fibrillation  Chronic hypoxic respiratory failure  Pulmonary hypertension  Panlobular emphysema  Chronic mucopurulent bronchitis  COPD asthma overlap syndrome  Venous hypertension  Supplemental oxygen dependency  Gastroesophageal reflux disease without esophagitis  History of kidney transplant  Acquired hypothyroidism  History of non-small cell lung cancer  History of lobectomy of lung right lower lobe  Peripheral polyneuropathy  History of peptic ulcer disease  Seasonal allergic rhinitis  Secondary hyperparathyroidism of renal origin  Secondary " hyperaldosteronism  Wegener's granulomatosis with renal involvement  Valvular heart disease  OCD  Osteoarthritis  Obsessive-compulsive disorder  History of non-ST segment elevation myocardial infarction  Long-term anticoagulant therapy usage  Long-term use of immunosuppressive biologic  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

## 2024-06-01 NOTE — PROGRESS NOTES
Referring Provider: Juanis Lowe MD    Reason for follow-up: Shortness of breath     Patient Care Team:  Jonatahn Luna APRN as PCP - General (Family Medicine)  Jak Gavin MD as Cardiologist (Cardiology)      SUBJECTIVE  Patient sitting in bed reports improvement in breathing.     ROS  Review of all systems negative except as indicated.    Since I have last seen, the patient has been without any chest discomfort, shortness of breath, palpitations, dizziness or syncope.  Denies having any headache, abdominal pain, nausea, vomiting, diarrhea, constipation, loss of weight or loss of appetite.  Denies having any excessive bruising, hematuria or blood in the stool.  ROS      Personal History:    Past Medical History:   Diagnosis Date    Allergic rhinitis 04/14/2015    Asthma 08/10/2017    Atrial flutter 05/11/2017    Chronic diarrhea 04/15/2019    Chronic hypoxemic respiratory failure 01/18/2024    Chronic pain 02/03/2015    COPD (chronic obstructive pulmonary disease)     COVID-19 virus detected 08/11/2022    Cytokine release syndrome, grade 1 08/16/2022    Edema of both lower extremities due to peripheral venous insufficiency 03/12/2021    ESRD on hemodialysis 05/22/2013    Essential (primary) hypertension 03/03/2022    Fracture of ulnar styloid 05/19/2022    Fracture of unspecified carpal bone, right wrist, subsequent encounter for fracture with routine healing 03/03/2022    Gastroesophageal reflux disease 11/12/2020    Gout 08/10/2017    Hearing loss 08/10/2017    History of appendectomy     History of DVT (deep vein thrombosis) 11/12/2020    History of kidney transplant 06/30/2017    History of lobectomy of lung 01/18/2024 03/08/2016:  RIGHT Lower Lobe Mass--> Right Video-assisted thoracoscopy with a moderate-to-large wedge resection of the RLL (by Dr. Haris Mcconnell @ [facility])--> Poorly differentiated carcinoma of the RLL.      History of repair of hip joint 05/21/2013    History of suicide attempt  05/20/2024    Hyperlipidemia 08/11/2014    Hypothyroidism, unspecified 03/03/2022    Infection due to extended spectrum beta lactamase (ESBL) producing bacteria 03/11/2016    No A2K system hx. +ESBL E coli urine on 3/11/16.      Irritable bowel syndrome 04/15/2019    Long term (current) use of immunosuppressive biologic 04/28/2021    Long term current use of anticoagulant therapy 01/18/2024    Malignant neoplasm of lung 08/10/2017    Menopausal flushing 08/30/2021    Mitral valve regurgitation 07/06/2015    Mixed anxiety and depressive disorder 04/30/2015    Non-small cell lung cancer 08/10/2017    Nonobstructive atherosclerosis of coronary artery 06/02/2019 08/12/2022: CATH: [facility]: NSTEMI assoc with Covid-19: LM:-nl;  LAD: diffuse, Ca++; 30%; CIRC: Dominant. Normal; RCA: small; 50% diffuse, proximal and mid-segment.      NSTEMI (non-ST elevated myocardial infarction) 08/11/2022    Obsessive-compulsive disorder 04/15/2019    Osteoarthritis 05/20/2024    Paroxysmal atrial fibrillation 05/11/2017    Paroxysmal supraventricular tachycardia 05/11/2017    Peripheral neuropathy 08/11/2014    Personal history of peptic ulcer disease 11/12/2020    Postoperative anemia due to acute blood loss 05/22/2013    Right wrist fracture 03/01/2022    Seasonal allergies 08/10/2017    Secondary hyperparathyroidism of renal origin 09/14/2015    Tricuspid valve regurgitation 03/15/2017    Ulcer of lower extremity 08/30/2021    Unspecified acute appendicitis 03/03/2022    Valvular heart disease 05/20/2024    Wegener's granulomatosis with renal involvement 03/03/2022       Past Surgical History:   Procedure Laterality Date    APPENDECTOMY      BREAST SURGERY Left     cysts rmeoved    CARDIAC CATHETERIZATION Right 08/12/2022    Procedure: Left Heart Cath and coronary angiogram;  Surgeon: Jak Gavin MD;  Location: [facility] INVASIVE LOCATION;  Service: Cardiology;  Laterality: Right;    CLOSED REDUCTION WRIST FRACTURE Right  03/01/2022    Procedure: WRIST CLOSED REDUCTION;  Surgeon: Gaudencio Townsend MD;  Location: [facility] MAIN OR;  Service: Orthopedics;  Laterality: Right;    CYSTOSCOPY      HIP ARTHROPLASTY      HYSTERECTOMY      LUNG LOBECTOMY Right     TRANSPLANTATION RENAL         Family History   Problem Relation Age of Onset    No Known Problems Mother     No Known Problems Father        Social History     Tobacco Use    Smoking status: Former    Smokeless tobacco: Never   Vaping Use    Vaping status: Former   Substance Use Topics    Alcohol use: Never    Drug use: Never        Home meds:  Prior to Admission medications    Medication Sig Start Date End Date Taking? Authorizing Provider   acetaminophen (Tylenol) 325 MG tablet Take 2 tablets by mouth Every 4 (Four) Hours As Needed for Fever or Mild Pain. 3/13/20   Court Vences MD   albuterol sulfate  (90 Base) MCG/ACT inhaler Inhale 2 puffs Every 6 (Six) Hours As Needed for Wheezing.    Court Vences MD   apixaban (ELIQUIS) 5 MG tablet tablet Take 1 tablet by mouth Every 12 (Twelve) Hours. 8/16/22   Clyde White,    Carboxymethylcellulose Sodium (DRY EYE RELIEF OP) Apply 2 drops to eye(s) as directed by provider 2 (Two) Times a Day As Needed (dry eyes). 5/11/22   Court Vences MD   colestipol (COLESTID) 1 g tablet Take 1 tablet by mouth Daily.    Court Vences MD   diazePAM (VALIUM) 5 MG tablet Take 5 mg by mouth 2 (Two) Times a Day As Needed for Anxiety.    Court Vences MD   Diclofenac Sodium (Aspercreme Arthritis Pain) 1 % gel gel Apply 4 g topically to the appropriate area as directed 2 (Two) Times a Day As Needed (pain). 10/21/20   Court Vences MD   dilTIAZem (CARDIZEM) 90 MG tablet Take 1 tablet by mouth Every 8 (Eight) Hours. 5/22/24   Mariangel Stearns APRN   DULoxetine HCl 40 MG capsule delayed-release particles Take 1 capsule by mouth Daily.    Court Vences MD   ferrous sulfate 324 (65 Fe) MG  tablet delayed-release EC tablet Take 1 tablet by mouth Daily With Breakfast. 5/23/24   Mariangel Stearns APRN   furosemide (LASIX) 80 MG tablet Take 1 tablet by mouth 2 (Two) Times a Day. 5/22/24   Mariangel Stearns APRN   guaiFENesin (Mucinex) 600 MG 12 hr tablet Take 1,200 mg by mouth 2 (Two) Times a Day. 8/16/22   Court Vences MD   levothyroxine (SYNTHROID, LEVOTHROID) 50 MCG tablet Take 1 tablet by mouth Daily.    Court Vences MD   loperamide (IMODIUM) 2 MG capsule Take 2 mg by mouth 4 (Four) Times a Day As Needed for Diarrhea. 3/13/20   Court Vences MD   metoprolol succinate XL (TOPROL-XL) 50 MG 24 hr tablet Take 50 mg by mouth Daily.    Court Vences MD   Zsdrd-Lahew-Puyqatz-Pramoxine (Neosporin + Pain Relief Max St) 1 % ointment Apply 1 application topically to the appropriate area as directed 2 (Two) Times a Day As Needed (sores). 7/2/20   Court Vences MD   potassium chloride (KLOR-CON M20) 20 MEQ CR tablet Take 2 tablets by mouth Daily. 5/22/24   Mariangel Stearns APRN   predniSONE (DELTASONE) 5 MG tablet Take 5 mg by mouth Daily.    Court Vences MD   Salicylic Acid-Urea (KERASAL EX) Apply 1 application topically to the appropriate area as directed 2 (Two) Times a Day As Needed (dry feet). 11/23/19   Court Vences MD   tacrolimus (PROGRAF) 1 MG capsule Take 1 mg by mouth 2 (Two) Times a Day.    Court Vences MD       Allergies:  Zolpidem    Scheduled Meds:apixaban, 5 mg, Oral, Q12H  budesonide, 0.5 mg, Nebulization, BID - RT  cholestyramine light, 1 packet, Oral, Daily  dexAMETHasone, 4 mg, Oral, Daily With Breakfast  dilTIAZem, 90 mg, Oral, Q8H  docusate sodium, 100 mg, Oral, BID  DULoxetine, 40 mg, Oral, Daily  empagliflozin, 25 mg, Oral, Daily  ferrous sulfate, 324 mg, Oral, Daily With Breakfast  furosemide, 80 mg, Oral, BID  guaiFENesin, 1,200 mg, Oral, BID  ipratropium-albuterol, 3 mL, Nebulization, 4x Daily -  "RT  levothyroxine, 50 mcg, Oral, Q AM  metoclopramide, 5 mg, Oral, 4x Daily AC & at Bedtime  metOLazone, 5 mg, Oral, Once per day on Monday Wednesday Friday  metoprolol succinate XL, 50 mg, Oral, Daily  polyethylene glycol, 17 g, Oral, Daily  potassium chloride, 40 mEq, Oral, Daily  predniSONE, 5 mg, Oral, Daily With Breakfast  sodium chloride, 10 mL, Intravenous, Q12H  tacrolimus, 1 mg, Oral, BID      Continuous Infusions:   PRN Meds:.  acetaminophen    Calcium Replacement - Follow Nurse / BPA Driven Protocol    carboxymethylcellulose sod    diazePAM    Diclofenac Sodium    hydrALAZINE    HYDROcodone-acetaminophen    loperamide    Magnesium Standard Dose Replacement - Follow Nurse / BPA Driven Protocol    nitroglycerin    ondansetron ODT **OR** ondansetron    Phosphorus Replacement - Follow Nurse / BPA Driven Protocol    Potassium Replacement - Follow Nurse / BPA Driven Protocol    [COMPLETED] Insert Peripheral IV **AND** sodium chloride    sodium chloride    sodium chloride      OBJECTIVE    Vital Signs  Vitals:    05/31/24 1518 05/31/24 2025 06/01/24 0017 06/01/24 0428   BP:  125/64 118/52 117/62   BP Location:  Left arm Left arm Left arm   Patient Position:  Lying Lying Lying   Pulse: 102 98 91 84   Resp: 18 22 18 16   Temp:  97.6 °F (36.4 °C) 97.3 °F (36.3 °C) 97.3 °F (36.3 °C)   TempSrc:  Oral Oral Oral   SpO2: 100% 96% 95% 93%   Weight:       Height:           Flowsheet Rows      Flowsheet Row First Filed Value   Admission Height 167.6 cm (66\") Documented at 05/24/2024 1143   Admission Weight 86.2 kg (190 lb) Documented at 05/24/2024 1143              Intake/Output Summary (Last 24 hours) at 6/1/2024 0630  Last data filed at 5/31/2024 2300  Gross per 24 hour   Intake 920 ml   Output 6300 ml   Net -5380 ml          Telemetry: Atrial fibrillation with heart rate in the 60s and 70s    Physical Exam:  The patient is alert, oriented and in no distress.  Obese  Vital signs as noted above.  Head and neck revealed no " carotid bruits or jugular venous distention.  No thyromegaly or lymphadenopathy is present  Lungs clear.  No wheezing.  Breath sounds are normal bilaterally.  Heart normal first and second heart sounds.  No murmur. No precordial rub is present.  No gallop is present.  Abdomen soft and nontender.  No organomegaly is present.  Extremities with good peripheral pulses without any pedal edema.  Skin warm and dry.  Musculoskeletal system is grossly normal.  CNS grossly normal.       Results Review:  I have personally reviewed the results from the time of this admission to 6/1/2024 06:30 EDT and agree with these findings:  []  Laboratory  []  Microbiology  []  Radiology  []  EKG/Telemetry   []  Cardiology/Vascular   []  Pathology  []  Old records  []  Other:    Most notable findings include:    Lab Results (last 24 hours)       Procedure Component Value Units Date/Time    Potassium [[id number]]  (Normal) Collected: 05/31/24 2048    Specimen: Blood Updated: 05/31/24 2135     Potassium 3.6 mmol/L             Imaging Results (Last 24 Hours)       Procedure Component Value Units Date/Time    XR Chest 1 View [[id number]] Collected: 05/31/24 1248     Updated: 05/31/24 1251    Narrative:      XR CHEST 1 VW    Date of Exam: 5/31/2024 12:31 PM EDT    Indication: Continued dyspnea, CHF    Comparison: 5/24/2024.    Findings:  There is stable mild to moderate cardiomegaly. There is pulmonary vascular congestion. There is a prominent interstitial pattern which appears similar. There are continued small pleural effusions. Lung fields remain hyperinflated.      Impression:      Impression:  Findings suggest CHF with mild pulmonary edema and small effusions although a component of underlying chronic lung disease is thought to be present as well.      Electronically Signed: Danna Carrera MD    5/31/2024 12:49 PM EDT    Workstation ID: [id number]            LAB RESULTS (LAST 7 DAYS)    CBC  Results from last 7 days   Lab Units 05/31/24  0517  05/30/24 0503 05/29/24 0446 05/28/24 0450 05/27/24 0526 05/26/24  0404   WBC 10*3/mm3 7.21 5.59 5.68 6.01 6.34 5.25   RBC 10*6/mm3 3.70* 3.06* 3.03* 3.31* 3.55* 3.11*   HEMOGLOBIN g/dL 10.5* 9.0* 8.8* 9.4* 10.2* 8.9*   HEMATOCRIT % 36.0 30.7* 30.6* 33.7* 36.0 31.0*   MCV fL 97.3* 100.3* 101.0* 101.8* 101.4* 99.7*   PLATELETS 10*3/mm3 179 149 152 161 168 143       BMP  Results from last 7 days   Lab Units 05/31/24 2048 05/31/24 0538 05/30/24 0503 05/29/24 0446 05/28/24 0450 05/27/24 0526 05/26/24 0404   SODIUM mmol/L  --  141 140 141 141 140 142   POTASSIUM mmol/L 3.6 3.5 3.8 4.3 4.6 4.3 4.2   CHLORIDE mmol/L  --  91* 99 101 99 97* 98   CO2 mmol/L  --  37.9* 33.6* 33.6* 35.9* 34.6* 36.3*   BUN mg/dL  --  17 15 16 19 20 25*   CREATININE mg/dL  --  0.99 0.75 0.76 0.84 0.89 1.10*   GLUCOSE mg/dL  --  130* 91 94 98 105* 91   MAGNESIUM mg/dL  --   --   --   --   --  1.9 1.8       CMP   Results from last 7 days   Lab Units 05/31/24 2048 05/31/24 0538 05/30/24 0503 05/29/24 0446 05/28/24 0450 05/27/24 0526 05/26/24  0404   SODIUM mmol/L  --  141 140 141 141 140 142   POTASSIUM mmol/L 3.6 3.5 3.8 4.3 4.6 4.3 4.2   CHLORIDE mmol/L  --  91* 99 101 99 97* 98   CO2 mmol/L  --  37.9* 33.6* 33.6* 35.9* 34.6* 36.3*   BUN mg/dL  --  17 15 16 19 20 25*   CREATININE mg/dL  --  0.99 0.75 0.76 0.84 0.89 1.10*   GLUCOSE mg/dL  --  130* 91 94 98 105* 91       BNP        TROPONIN          CoAg          Creatinine Clearance  Estimated Creatinine Clearance: 52.2 mL/min (by C-G formula based on SCr of 0.99 mg/dL).    ABG          Radiology  XR Chest 1 View    Result Date: 5/31/2024  Impression: Findings suggest CHF with mild pulmonary edema and small effusions although a component of underlying chronic lung disease is thought to be present as well. Electronically Signed: Danna Carrera MD  5/31/2024 12:49 PM EDT  Workstation ID: [id number]       EKG  I personally viewed and interpreted the patient's EKG/Telemetry data:  ECG 12  Lead Dyspnea   Final Result   HEART RATE= 82  bpm   RR Interval= 732  ms   AL Interval=   ms   P Horizontal Axis=   deg   P Front Axis=   deg   QRSD Interval= 88  ms   QT Interval= 407  ms   QTcB= 476  ms   QRS Axis= -32  deg   T Wave Axis= 49  deg   - ABNORMAL ECG -   Atrial fibrillation   Left axis deviation   When compared with ECG of 20-May-2024 9:37:54,   Significant rate increase   Significant repolarization change   Electronically Signed By: Praneeth Theodore (Derek) 25-May-2024 07:53:06   Date and Time of Study: 2024-05-24 11:36:06      Telemetry Scan   Final Result      Telemetry Scan   Final Result      Telemetry Scan   Final Result      Telemetry Scan   Final Result      Telemetry Scan   Final Result      Telemetry Scan   Final Result      Telemetry Scan   Final Result      Telemetry Scan   Final Result      Telemetry Scan   Final Result      Telemetry Scan   Final Result      Telemetry Scan   Final Result      Telemetry Scan   Final Result      Telemetry Scan   Final Result      Telemetry Scan   Final Result      Telemetry Scan   Final Result      Telemetry Scan   Final Result      Telemetry Scan   Final Result      Telemetry Scan   Final Result      Telemetry Scan   Final Result      Telemetry Scan   Final Result      Telemetry Scan   Final Result      Telemetry Scan   Final Result            Echocardiogram:    Results for orders placed during the hospital encounter of 05/17/24    Adult Transthoracic Echo Complete W/ Cont if Necessary Per Protocol    Interpretation Summary    Left ventricular systolic function is normal. Calculated left ventricular EF = 55% Left ventricular ejection fraction appears to be 51 - 55%.    Left ventricular diastolic function was indeterminate.    Left atrial volume is moderately increased.    The right atrial cavity is dilated.    Severe tricuspid valve regurgitation is present.    Estimated right ventricular systolic pressure from tricuspid regurgitation is markedly elevated  (>55 mmHg).    Mild to moderate mitral valve regurgitation is present        Stress Test:         Cardiac Catheterization:  Results for orders placed during the hospital encounter of 08/11/22    Cardiac Catheterization/Vascular Study    Conclusion  IMPRESSIONS  Non obstructive CAD         Other:         ASSESSMENT & PLAN:    Principal Problem:    Acute exacerbation of CHF (congestive heart failure)      Atrial fibrillation  [id number] score is 5  Continue Eliquis for stroke prevention  Heart rate is well-controlled on Toprol-XL and Cardizem.  Unable to take Cardizem CD due to interaction with immunosuppressants.  TSH is normal     HFpEF/Pulmonary hypertension  Presented with proBNP of 4000 (compared to 8000 during previous admission)  Completed IV diuretics.  Now on oral Lasix  Monitor I's and O's and replace electrolytes as needed  Continue Toprol-XL  Continue Jardiance  Echocardiogram shows preserved LV function with severe tricuspid valve regurgitation and severe pulmonary hypertension.     UTI  On antibiotics     History of kidney transplant  On prednisone and Prograf  Creatinine 1.06, GFR is 54.2  Completed IV diuretics.  Now on oral Lasix  Metolazone per nephrology  Bicarb is 33     COPD  History of lung cancer  CT shows minimal interval enlargement of the tissue surrounding the postsurgical changes associated with small bilateral pleural effusions.  Continue bronchodilators.  She should follow-up with an oncologist outpatient.     Obesity  Lifestyle modifications recommended to the patient.  She weighs 186 pounds.  BMI is 30.14  (Weight is listed as 159 pounds: (? Error)    Jak Gavin MD  06/01/24  06:30 EDT

## 2024-06-01 NOTE — PLAN OF CARE
Goal Outcome Evaluation:Pt a/o, able to make needs known. Will continue to monitor.

## 2024-06-02 LAB
ANION GAP SERPL CALCULATED.3IONS-SCNC: 12.2 MMOL/L (ref 5–15)
BASOPHILS # BLD AUTO: 0.01 10*3/MM3 (ref 0–0.2)
BASOPHILS NFR BLD AUTO: 0.1 % (ref 0–1.5)
BUN SERPL-MCNC: 35 MG/DL (ref 8–23)
BUN/CREAT SERPL: 35 (ref 7–25)
CALCIUM SPEC-SCNC: 10.3 MG/DL (ref 8.6–10.5)
CHLORIDE SERPL-SCNC: 98 MMOL/L (ref 98–107)
CO2 SERPL-SCNC: 25.8 MMOL/L (ref 22–29)
CREAT SERPL-MCNC: 1 MG/DL (ref 0.57–1)
DEPRECATED RDW RBC AUTO: 61.4 FL (ref 37–54)
EGFRCR SERPLBLD CKD-EPI 2021: 58.1 ML/MIN/1.73
EOSINOPHIL # BLD AUTO: 0 10*3/MM3 (ref 0–0.4)
EOSINOPHIL NFR BLD AUTO: 0 % (ref 0.3–6.2)
ERYTHROCYTE [DISTWIDTH] IN BLOOD BY AUTOMATED COUNT: 17.1 % (ref 12.3–15.4)
GLUCOSE SERPL-MCNC: 154 MG/DL (ref 65–99)
HCT VFR BLD AUTO: 35 % (ref 34–46.6)
HGB BLD-MCNC: 10.6 G/DL (ref 12–15.9)
IMM GRANULOCYTES # BLD AUTO: 0.12 10*3/MM3 (ref 0–0.05)
IMM GRANULOCYTES NFR BLD AUTO: 1.2 % (ref 0–0.5)
LYMPHOCYTES # BLD AUTO: 0.47 10*3/MM3 (ref 0.7–3.1)
LYMPHOCYTES NFR BLD AUTO: 4.8 % (ref 19.6–45.3)
MCH RBC QN AUTO: 29.6 PG (ref 26.6–33)
MCHC RBC AUTO-ENTMCNC: 30.3 G/DL (ref 31.5–35.7)
MCV RBC AUTO: 97.8 FL (ref 79–97)
MONOCYTES # BLD AUTO: 0.83 10*3/MM3 (ref 0.1–0.9)
MONOCYTES NFR BLD AUTO: 8.4 % (ref 5–12)
NEUTROPHILS NFR BLD AUTO: 8.42 10*3/MM3 (ref 1.7–7)
NEUTROPHILS NFR BLD AUTO: 85.5 % (ref 42.7–76)
NRBC BLD AUTO-RTO: 0 /100 WBC (ref 0–0.2)
PLATELET # BLD AUTO: 195 10*3/MM3 (ref 140–450)
PMV BLD AUTO: 9.4 FL (ref 6–12)
POTASSIUM SERPL-SCNC: 4 MMOL/L (ref 3.5–5.2)
RBC # BLD AUTO: 3.58 10*6/MM3 (ref 3.77–5.28)
SODIUM SERPL-SCNC: 136 MMOL/L (ref 136–145)
WBC NRBC COR # BLD AUTO: 9.85 10*3/MM3 (ref 3.4–10.8)

## 2024-06-02 PROCEDURE — 63710000001 DEXAMETHASONE PER 0.25 MG: Performed by: INTERNAL MEDICINE

## 2024-06-02 PROCEDURE — 63710000001 PREDNISONE PER 5 MG: Performed by: INTERNAL MEDICINE

## 2024-06-02 PROCEDURE — 85025 COMPLETE CBC W/AUTO DIFF WBC: CPT | Performed by: INTERNAL MEDICINE

## 2024-06-02 PROCEDURE — 94799 UNLISTED PULMONARY SVC/PX: CPT

## 2024-06-02 PROCEDURE — 63710000001 TACROLIMUS PER 1 MG: Performed by: INTERNAL MEDICINE

## 2024-06-02 PROCEDURE — 99232 SBSQ HOSP IP/OBS MODERATE 35: CPT | Performed by: INTERNAL MEDICINE

## 2024-06-02 PROCEDURE — 80048 BASIC METABOLIC PNL TOTAL CA: CPT | Performed by: INTERNAL MEDICINE

## 2024-06-02 PROCEDURE — 94664 DEMO&/EVAL PT USE INHALER: CPT

## 2024-06-02 RX ORDER — IPRATROPIUM BROMIDE AND ALBUTEROL SULFATE 2.5; .5 MG/3ML; MG/3ML
3 SOLUTION RESPIRATORY (INHALATION) EVERY 6 HOURS PRN
Status: DISCONTINUED | OUTPATIENT
Start: 2024-06-02 | End: 2024-06-03 | Stop reason: HOSPADM

## 2024-06-02 RX ADMIN — IPRATROPIUM BROMIDE AND ALBUTEROL SULFATE 3 ML: .5; 3 SOLUTION RESPIRATORY (INHALATION) at 14:43

## 2024-06-02 RX ADMIN — POTASSIUM CHLORIDE 40 MEQ: 1500 TABLET, EXTENDED RELEASE ORAL at 08:48

## 2024-06-02 RX ADMIN — TACROLIMUS 1 MG: 1 CAPSULE ORAL at 08:47

## 2024-06-02 RX ADMIN — METOCLOPRAMIDE 5 MG: 5 TABLET ORAL at 21:13

## 2024-06-02 RX ADMIN — METOPROLOL SUCCINATE 50 MG: 50 TABLET, EXTENDED RELEASE ORAL at 08:48

## 2024-06-02 RX ADMIN — DEXAMETHASONE 4 MG: 4 TABLET ORAL at 08:48

## 2024-06-02 RX ADMIN — FERROUS SULFATE TAB EC 324 MG (65 MG FE EQUIVALENT) 324 MG: 324 (65 FE) TABLET DELAYED RESPONSE at 08:48

## 2024-06-02 RX ADMIN — DIAZEPAM 5 MG: 5 TABLET ORAL at 21:13

## 2024-06-02 RX ADMIN — IPRATROPIUM BROMIDE AND ALBUTEROL SULFATE 3 ML: .5; 3 SOLUTION RESPIRATORY (INHALATION) at 11:01

## 2024-06-02 RX ADMIN — METOCLOPRAMIDE 5 MG: 5 TABLET ORAL at 08:48

## 2024-06-02 RX ADMIN — GUAIFENESIN 1200 MG: 600 TABLET, EXTENDED RELEASE ORAL at 21:12

## 2024-06-02 RX ADMIN — PREDNISONE 5 MG: 5 TABLET ORAL at 08:48

## 2024-06-02 RX ADMIN — BUDESONIDE 0.5 MG: 0.5 INHALANT RESPIRATORY (INHALATION) at 07:14

## 2024-06-02 RX ADMIN — FUROSEMIDE 80 MG: 40 TABLET ORAL at 08:48

## 2024-06-02 RX ADMIN — GUAIFENESIN 1200 MG: 600 TABLET, EXTENDED RELEASE ORAL at 08:47

## 2024-06-02 RX ADMIN — CHOLESTYRAMINE 4 G: 4 POWDER, FOR SUSPENSION ORAL at 08:48

## 2024-06-02 RX ADMIN — LEVOTHYROXINE SODIUM 50 MCG: 0.05 TABLET ORAL at 05:06

## 2024-06-02 RX ADMIN — DOCUSATE SODIUM 100 MG: 100 CAPSULE, LIQUID FILLED ORAL at 21:12

## 2024-06-02 RX ADMIN — DILTIAZEM HYDROCHLORIDE 90 MG: 60 TABLET, FILM COATED ORAL at 21:13

## 2024-06-02 RX ADMIN — TACROLIMUS 1 MG: 1 CAPSULE ORAL at 21:10

## 2024-06-02 RX ADMIN — HYDROCODONE BITARTRATE AND ACETAMINOPHEN 1 TABLET: 5; 325 TABLET ORAL at 01:33

## 2024-06-02 RX ADMIN — Medication 10 ML: at 08:48

## 2024-06-02 RX ADMIN — METOCLOPRAMIDE 5 MG: 5 TABLET ORAL at 11:25

## 2024-06-02 RX ADMIN — Medication 10 ML: at 21:21

## 2024-06-02 RX ADMIN — EMPAGLIFLOZIN 25 MG: 25 TABLET, FILM COATED ORAL at 08:48

## 2024-06-02 RX ADMIN — DILTIAZEM HYDROCHLORIDE 90 MG: 60 TABLET, FILM COATED ORAL at 13:37

## 2024-06-02 RX ADMIN — HYDROCODONE BITARTRATE AND ACETAMINOPHEN 1 TABLET: 5; 325 TABLET ORAL at 21:13

## 2024-06-02 RX ADMIN — IPRATROPIUM BROMIDE AND ALBUTEROL SULFATE 3 ML: .5; 3 SOLUTION RESPIRATORY (INHALATION) at 07:14

## 2024-06-02 RX ADMIN — DILTIAZEM HYDROCHLORIDE 90 MG: 60 TABLET, FILM COATED ORAL at 05:06

## 2024-06-02 RX ADMIN — DULOXETINE HYDROCHLORIDE 40 MG: 20 CAPSULE, DELAYED RELEASE ORAL at 08:48

## 2024-06-02 RX ADMIN — APIXABAN 5 MG: 5 TABLET, FILM COATED ORAL at 08:48

## 2024-06-02 RX ADMIN — METOCLOPRAMIDE 5 MG: 5 TABLET ORAL at 17:28

## 2024-06-02 RX ADMIN — BUDESONIDE 0.5 MG: 0.5 INHALANT RESPIRATORY (INHALATION) at 20:20

## 2024-06-02 RX ADMIN — APIXABAN 5 MG: 5 TABLET, FILM COATED ORAL at 21:12

## 2024-06-02 NOTE — CASE MANAGEMENT/SOCIAL WORK
Continued Stay Note  DAHIANA Gay     Patient Name: Jaja Carcamo  MRN: [id number]  Today's Date: 6/2/2024    Admit Date: 5/24/2024    Plan: [facility], [facility] approved 5/30-6/3. [facility] approved.   Discharge Plan       Row Name 06/02/24 1635       Plan    Plan Comments updated nursing that bed is avail and ready if md is ready to discharge patient.                      Expected Discharge Date and Time       Expected Discharge Date Expected Discharge Time    Ismael 3, 2024               Lana Richardson RN

## 2024-06-02 NOTE — PLAN OF CARE
Goal Outcome Evaluation:  Plan of Care Reviewed With: patient        Progress: improving  Outcome Evaluation: Patient denies pain this shift. Has sat up in chair most of shift. on 4L oxygen. pt on fluid restriction. Pt to go to [facility] on discharge. Will monitor.

## 2024-06-02 NOTE — PROGRESS NOTES
LOS: 9 days   Patient Care Team:  Jonathan Luna APRN as PCP - General (Family Medicine)  Jak Gavin MD as Cardiologist (Cardiology)    Subjective:  Weak and miserable    Objective:   Afebrile      Review of Systems:   Review of Systems   Constitutional:  Positive for activity change.   Respiratory:  Positive for shortness of breath.    Neurological:  Positive for weakness.           Vital Signs  Temp:  [98 °F (36.7 °C)-98.5 °F (36.9 °C)] 98 °F (36.7 °C)  Heart Rate:  [] 83  Resp:  [18-29] 18  BP: (116-137)/(57-68) 125/68    Physical Exam:  Physical Exam  Vitals and nursing note reviewed.   Cardiovascular:      Rate and Rhythm: Normal rate.      Heart sounds: Normal heart sounds.   Pulmonary:      Breath sounds: Normal breath sounds.   Skin:     General: Skin is warm.   Neurological:      Mental Status: She is alert.          Radiology:  XR Chest 1 View    Result Date: 5/31/2024  Impression: Findings suggest CHF with mild pulmonary edema and small effusions although a component of underlying chronic lung disease is thought to be present as well. Electronically Signed: Danna Carrera MD  5/31/2024 12:49 PM EDT  Workstation ID: [id number]        Results Review:     I reviewed the patient's new clinical results.  I reviewed the patient's new imaging results and agree with the interpretation.    Medication Review:   Scheduled Meds:apixaban, 5 mg, Oral, Q12H  budesonide, 0.5 mg, Nebulization, BID - RT  cholestyramine light, 1 packet, Oral, Daily  dexAMETHasone, 4 mg, Oral, Daily With Breakfast  dilTIAZem, 90 mg, Oral, Q8H  docusate sodium, 100 mg, Oral, BID  DULoxetine, 40 mg, Oral, Daily  empagliflozin, 25 mg, Oral, Daily  ferrous sulfate, 324 mg, Oral, Daily With Breakfast  furosemide, 80 mg, Oral, BID  guaiFENesin, 1,200 mg, Oral, BID  ipratropium-albuterol, 3 mL, Nebulization, 4x Daily - RT  levothyroxine, 50 mcg, Oral, Q AM  metoclopramide, 5 mg, Oral, 4x Daily AC & at Bedtime  metOLazone, 5 mg, Oral,  Once per day on Monday Wednesday Friday  metoprolol succinate XL, 50 mg, Oral, Daily  polyethylene glycol, 17 g, Oral, Daily  potassium chloride, 40 mEq, Oral, Daily  predniSONE, 5 mg, Oral, Daily With Breakfast  sodium chloride, 10 mL, Intravenous, Q12H  tacrolimus, 1 mg, Oral, BID      Continuous Infusions:   PRN Meds:.  acetaminophen    Calcium Replacement - Follow Nurse / BPA Driven Protocol    carboxymethylcellulose sod    diazePAM    Diclofenac Sodium    hydrALAZINE    HYDROcodone-acetaminophen    loperamide    Magnesium Standard Dose Replacement - Follow Nurse / BPA Driven Protocol    nitroglycerin    ondansetron ODT **OR** ondansetron    Phosphorus Replacement - Follow Nurse / BPA Driven Protocol    Potassium Replacement - Follow Nurse / BPA Driven Protocol    [COMPLETED] Insert Peripheral IV **AND** sodium chloride    sodium chloride    sodium chloride    Labs:    CBC    Results from last 7 days   Lab Units 06/02/24  0130 06/01/24  0750 05/31/24  0538 05/30/24  0503 05/29/24  0446 05/28/24  0450 05/27/24  0526   WBC 10*3/mm3 9.85 10.36 7.21 5.59 5.68 6.01 6.34   HEMOGLOBIN g/dL 10.6* 10.9* 10.5* 9.0* 8.8* 9.4* 10.2*   PLATELETS 10*3/mm3 195 253 179 149 152 161 168     BMP   Results from last 7 days   Lab Units 06/02/24  0130 06/01/24  1743 06/01/24  0750 05/31/24  2048 05/31/24  0538 05/30/24  0503 05/29/24  0446 05/28/24  0450 05/27/24  0526   SODIUM mmol/L 136  --  139  --  141 140 141 141 140   POTASSIUM mmol/L 4.0 4.1 3.5 3.6 3.5 3.8 4.3 4.6 4.3   CHLORIDE mmol/L 98  --  92*  --  91* 99 101 99 97*   CO2 mmol/L 25.8  --  33.3*  --  37.9* 33.6* 33.6* 35.9* 34.6*   BUN mg/dL 35*  --  30*  --  17 15 16 19 20   CREATININE mg/dL 1.00  --  1.06*  --  0.99 0.75 0.76 0.84 0.89   GLUCOSE mg/dL 154*  --  139*  --  130* 91 94 98 105*   MAGNESIUM mg/dL  --   --  1.9  --   --   --   --   --  1.9     Cr Clearance Estimated Creatinine Clearance: 48 mL/min (by C-G formula based on SCr of 1 mg/dL).  Coag     HbA1C No  "results found for: \"HGBA1C\"  Blood Glucose No results found for: \"POCGLU\"  Infection     CMP   Results from last 7 days   Lab Units 06/02/24  0130 06/01/24  1743 06/01/24  0750 05/31/24  2048 05/31/24  0538 05/30/24  0503 05/29/24  0446 05/28/24  0450 05/27/24  0526   SODIUM mmol/L 136  --  139  --  141 140 141 141 140   POTASSIUM mmol/L 4.0 4.1 3.5 3.6 3.5 3.8 4.3 4.6 4.3   CHLORIDE mmol/L 98  --  92*  --  91* 99 101 99 97*   CO2 mmol/L 25.8  --  33.3*  --  37.9* 33.6* 33.6* 35.9* 34.6*   BUN mg/dL 35*  --  30*  --  17 15 16 19 20   CREATININE mg/dL 1.00  --  1.06*  --  0.99 0.75 0.76 0.84 0.89   GLUCOSE mg/dL 154*  --  139*  --  130* 91 94 98 105*     UA      Radiology(recent) XR Chest 1 View    Result Date: 5/31/2024  Impression: Findings suggest CHF with mild pulmonary edema and small effusions although a component of underlying chronic lung disease is thought to be present as well. Electronically Signed: Danna Carrera MD  5/31/2024 12:49 PM EDT  Workstation ID: [id number]    Assessment:    SOB  Acute exacerbation of diastolic congestive heart failure  ESRD  Hypertension with ESRD  ALISE  Anemia with ESRD  Paroxysmal atrial fibrillation  Chronic diastolic congestive heart failure  Hypercoagulable state secondary to atrial fibrillation  Chronic hypoxic respiratory failure  Pulmonary hypertension  Panlobular emphysema  Chronic mucopurulent bronchitis  COPD asthma overlap syndrome  Venous hypertension  Supplemental oxygen dependency  Gastroesophageal reflux disease without esophagitis  History of kidney transplant  Acquired hypothyroidism  History of non-small cell lung cancer  History of lobectomy of lung right lower lobe  Peripheral polyneuropathy  History of peptic ulcer disease  Seasonal allergic rhinitis  Secondary hyperparathyroidism of renal origin  Secondary hyperaldosteronism  Wegener's granulomatosis with renal involvement  Valvular heart disease  OCD  Osteoarthritis  Obsessive-compulsive disorder  History " of non-ST segment elevation myocardial infarction  Long-term anticoagulant therapy usage  Long-term use of immunosuppressive biologic  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

## 2024-06-02 NOTE — NURSING NOTE
Reached out to MD in regards to bed availability at [facility]. Per MD Heimer pt to discharge tomorrow.

## 2024-06-02 NOTE — PROGRESS NOTES
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  Atrial fibrillation with RV  Volume overload  Hx fall  Hypothyroidism    Hyperlipidemia  History of kidney transplant  Anemia  CAD  History of DVT     Pulmonary hypertension  HTN  UTI        PLAN:     Respiratory status is improving  Continue short course of dexamethasone until 6/3/2024  Nebulized bronchodilators  Nebulized corticosteroids  Continue Mucinex    oxygen supplement and titration to maintain saturation 90-95%: Currently requiring 4 L per nasal cannula  Encouraged use I-S flutter valve  Antibiotics: completed 5/28/2024    Diuresis and electrolyte management as per nephrology  Electrolytes/ glycemic control  BP/HR control  Thyroid hormone replacement  Chronic anticoagulation: Apixaban     I personally reviewed the radiological images               LOS: 9 days     Subjective     Patient reports less shortness of breath    Objective     Vital signs for last 24 hours:  Vitals:    06/02/24 1104 06/02/24 1146 06/02/24 1211 06/02/24 1331   BP:  136/59  126/68   BP Location:  Left arm  Left arm   Patient Position:  Sitting  Sitting   Pulse: 85 78  89   Resp: 16 (!) 30 20    Temp:       TempSrc:       SpO2: 98% 100%     Weight:       Height:           Intake/Output last 3 shifts:  I/O last 3 completed shifts:  In: 600 [P.O.:600]  Out: 5700 [Urine:5700]  Intake/Output this shift:  I/O this shift:  In: 240 [P.O.:240]  Out: 800 [Urine:800]      Radiology  Imaging Results (Last 24 Hours)       ** No results found for the last 24 hours. **            Labs:  Results from last 7 days   Lab Units 06/02/24  0130   WBC 10*3/mm3 9.85   HEMOGLOBIN g/dL 10.6*   HEMATOCRIT % 35.0   PLATELETS 10*3/mm3 195     Results from last 7 days   Lab Units 06/02/24  0130   SODIUM mmol/L 136   POTASSIUM mmol/L 4.0   CHLORIDE mmol/L 98   CO2 mmol/L 25.8   BUN mg/dL 35*   CREATININE mg/dL  1.00   CALCIUM mg/dL 10.3   GLUCOSE mg/dL 154*                           Results from last 7 days   Lab Units 06/01/24  0750   MAGNESIUM mg/dL 1.9                       Meds:   SCHEDULE  apixaban, 5 mg, Oral, Q12H  budesonide, 0.5 mg, Nebulization, BID - RT  cholestyramine light, 1 packet, Oral, Daily  dexAMETHasone, 4 mg, Oral, Daily With Breakfast  dilTIAZem, 90 mg, Oral, Q8H  docusate sodium, 100 mg, Oral, BID  DULoxetine, 40 mg, Oral, Daily  empagliflozin, 25 mg, Oral, Daily  ferrous sulfate, 324 mg, Oral, Daily With Breakfast  furosemide, 80 mg, Oral, BID  guaiFENesin, 1,200 mg, Oral, BID  ipratropium-albuterol, 3 mL, Nebulization, 4x Daily - RT  levothyroxine, 50 mcg, Oral, Q AM  metoclopramide, 5 mg, Oral, 4x Daily AC & at Bedtime  metOLazone, 5 mg, Oral, Once per day on Monday Wednesday Friday  metoprolol succinate XL, 50 mg, Oral, Daily  polyethylene glycol, 17 g, Oral, Daily  potassium chloride, 40 mEq, Oral, Daily  predniSONE, 5 mg, Oral, Daily With Breakfast  sodium chloride, 10 mL, Intravenous, Q12H  tacrolimus, 1 mg, Oral, BID      Infusions     PRNs    acetaminophen    Calcium Replacement - Follow Nurse / BPA Driven Protocol    carboxymethylcellulose sod    diazePAM    Diclofenac Sodium    hydrALAZINE    HYDROcodone-acetaminophen    loperamide    Magnesium Standard Dose Replacement - Follow Nurse / BPA Driven Protocol    nitroglycerin    ondansetron ODT **OR** ondansetron    Phosphorus Replacement - Follow Nurse / BPA Driven Protocol    Potassium Replacement - Follow Nurse / BPA Driven Protocol    [COMPLETED] Insert Peripheral IV **AND** sodium chloride    sodium chloride    sodium chloride    Physical Exam:  General Appearance:  Alert   HEENT:  Normocephalic, without obvious abnormality, Conjunctiva/corneas clear,.   Nares normal, no drainage     Neck:  Supple, symmetrical, trachea midline.   Lungs /Chest wall:   Improved air entry, mild bilateral basal rhonchi, respirations unlabored, symmetrical  wall movement.     Heart:  Regular rate and rhythm, S1 S2 normal  Abdomen: Soft, non-tender, no masses, no organomegaly.    Extremities: Improving lower extremities edema, no clubbing or cyanosis, right upper extremity fistula in place    ROS  Constitutional: Negative for chills, fever and malaise/fatigue.   HENT: Negative.    Eyes: Negative.    Cardiovascular: Negative.    Respiratory: Positive for  shortness of breath.    Skin: Negative.    Musculoskeletal: Negative.    Gastrointestinal: Negative.    Genitourinary: Negative.    Neurological: Generalized weakness      I reviewed the recent clinical results  I personally reviewed the latest radiological studies    Part of this note may be an electronic transcription/translation of spoken language to printed text using the Dragon Dictation System.

## 2024-06-02 NOTE — PLAN OF CARE
Problem: Adult Inpatient Plan of Care  Goal: Plan of Care Review  Outcome: Ongoing, Progressing  Flowsheets (Taken 6/2/2024 0354)  Progress: improving  Plan of Care Reviewed With: patient  Outcome Evaluation: On PO Lasix now. Remains on 4L NC . Labs drawnthis shift. Afib on tele. Pain meds given PRN. Will be going to [facility] upon D/C.   Goal Outcome Evaluation:  Plan of Care Reviewed With: patient        Progress: improving  Outcome Evaluation: On PO Lasix now. Remains on 4L NC . Labs drawnthis shift. Afib on tele. Pain meds given PRN. Will be going to [facility] upon D/C.

## 2024-06-02 NOTE — PROGRESS NOTES
Referring Provider: Juanis Lowe MD    Reason for follow-up: Shortness of breath     Patient Care Team:  Jonathan Luna APRN as PCP - General (Family Medicine)  Jak Gavin MD as Cardiologist (Cardiology)      SUBJECTIVE  Resting comfortably in bed.  Slept well.  Currently on 4 L of oxygen.  Shortness of breath is improving     ROS  Review of all systems negative except as indicated.    Since I have last seen, the patient has been without any chest discomfort, shortness of breath, palpitations, dizziness or syncope.  Denies having any headache, abdominal pain, nausea, vomiting, diarrhea, constipation, loss of weight or loss of appetite.  Denies having any excessive bruising, hematuria or blood in the stool.  ROS      Personal History:    Past Medical History:   Diagnosis Date    Allergic rhinitis 04/14/2015    Asthma 08/10/2017    Atrial flutter 05/11/2017    Chronic diarrhea 04/15/2019    Chronic hypoxemic respiratory failure 01/18/2024    Chronic pain 02/03/2015    COPD (chronic obstructive pulmonary disease)     COVID-19 virus detected 08/11/2022    Cytokine release syndrome, grade 1 08/16/2022    Edema of both lower extremities due to peripheral venous insufficiency 03/12/2021    ESRD on hemodialysis 05/22/2013    Essential (primary) hypertension 03/03/2022    Fracture of ulnar styloid 05/19/2022    Fracture of unspecified carpal bone, right wrist, subsequent encounter for fracture with routine healing 03/03/2022    Gastroesophageal reflux disease 11/12/2020    Gout 08/10/2017    Hearing loss 08/10/2017    History of appendectomy     History of DVT (deep vein thrombosis) 11/12/2020    History of kidney transplant 06/30/2017    History of lobectomy of lung 01/18/2024 03/08/2016:  RIGHT Lower Lobe Mass--> Right Video-assisted thoracoscopy with a moderate-to-large wedge resection of the RLL (by Dr. Haris Mcconnell @ [facility])--> Poorly differentiated carcinoma of the RLL.      History of repair of hip  joint 05/21/2013    History of suicide attempt 05/20/2024    Hyperlipidemia 08/11/2014    Hypothyroidism, unspecified 03/03/2022    Infection due to extended spectrum beta lactamase (ESBL) producing bacteria 03/11/2016    No A2K system hx. +ESBL E coli urine on 3/11/16.      Irritable bowel syndrome 04/15/2019    Long term (current) use of immunosuppressive biologic 04/28/2021    Long term current use of anticoagulant therapy 01/18/2024    Malignant neoplasm of lung 08/10/2017    Menopausal flushing 08/30/2021    Mitral valve regurgitation 07/06/2015    Mixed anxiety and depressive disorder 04/30/2015    Non-small cell lung cancer 08/10/2017    Nonobstructive atherosclerosis of coronary artery 06/02/2019 08/12/2022: CATH: [facility]: NSTEMI assoc with Covid-19: LM:-nl;  LAD: diffuse, Ca++; 30%; CIRC: Dominant. Normal; RCA: small; 50% diffuse, proximal and mid-segment.      NSTEMI (non-ST elevated myocardial infarction) 08/11/2022    Obsessive-compulsive disorder 04/15/2019    Osteoarthritis 05/20/2024    Paroxysmal atrial fibrillation 05/11/2017    Paroxysmal supraventricular tachycardia 05/11/2017    Peripheral neuropathy 08/11/2014    Personal history of peptic ulcer disease 11/12/2020    Postoperative anemia due to acute blood loss 05/22/2013    Right wrist fracture 03/01/2022    Seasonal allergies 08/10/2017    Secondary hyperparathyroidism of renal origin 09/14/2015    Tricuspid valve regurgitation 03/15/2017    Ulcer of lower extremity 08/30/2021    Unspecified acute appendicitis 03/03/2022    Valvular heart disease 05/20/2024    Wegener's granulomatosis with renal involvement 03/03/2022       Past Surgical History:   Procedure Laterality Date    APPENDECTOMY      BREAST SURGERY Left     cysts rmeoved    CARDIAC CATHETERIZATION Right 08/12/2022    Procedure: Left Heart Cath and coronary angiogram;  Surgeon: Jak Gavin MD;  Location: [facility] INVASIVE LOCATION;  Service: Cardiology;  Laterality:  Right;    CLOSED REDUCTION WRIST FRACTURE Right 03/01/2022    Procedure: WRIST CLOSED REDUCTION;  Surgeon: Gaudencio Townsend MD;  Location: [facility] MAIN OR;  Service: Orthopedics;  Laterality: Right;    CYSTOSCOPY      HIP ARTHROPLASTY      HYSTERECTOMY      LUNG LOBECTOMY Right     TRANSPLANTATION RENAL         Family History   Problem Relation Age of Onset    No Known Problems Mother     No Known Problems Father        Social History     Tobacco Use    Smoking status: Former    Smokeless tobacco: Never   Vaping Use    Vaping status: Former   Substance Use Topics    Alcohol use: Never    Drug use: Never        Home meds:  Prior to Admission medications    Medication Sig Start Date End Date Taking? Authorizing Provider   acetaminophen (Tylenol) 325 MG tablet Take 2 tablets by mouth Every 4 (Four) Hours As Needed for Fever or Mild Pain. 3/13/20   Court Vences MD   albuterol sulfate  (90 Base) MCG/ACT inhaler Inhale 2 puffs Every 6 (Six) Hours As Needed for Wheezing.    Court Vences MD   apixaban (ELIQUIS) 5 MG tablet tablet Take 1 tablet by mouth Every 12 (Twelve) Hours. 8/16/22   Clyde White,    Carboxymethylcellulose Sodium (DRY EYE RELIEF OP) Apply 2 drops to eye(s) as directed by provider 2 (Two) Times a Day As Needed (dry eyes). 5/11/22   Court Vences MD   colestipol (COLESTID) 1 g tablet Take 1 tablet by mouth Daily.    Court Vences MD   diazePAM (VALIUM) 5 MG tablet Take 5 mg by mouth 2 (Two) Times a Day As Needed for Anxiety.    Court Vences MD   Diclofenac Sodium (Aspercreme Arthritis Pain) 1 % gel gel Apply 4 g topically to the appropriate area as directed 2 (Two) Times a Day As Needed (pain). 10/21/20   Court Vences MD   dilTIAZem (CARDIZEM) 90 MG tablet Take 1 tablet by mouth Every 8 (Eight) Hours. 5/22/24   Mariangel Stearns APRN   DULoxetine HCl 40 MG capsule delayed-release particles Take 1 capsule by mouth Daily.    Provider  MD Court   ferrous sulfate 324 (65 Fe) MG tablet delayed-release EC tablet Take 1 tablet by mouth Daily With Breakfast. 5/23/24   Mariangel Stearns APRN   furosemide (LASIX) 80 MG tablet Take 1 tablet by mouth 2 (Two) Times a Day. 5/22/24   Mariangel Stearns APRN   guaiFENesin (Mucinex) 600 MG 12 hr tablet Take 1,200 mg by mouth 2 (Two) Times a Day. 8/16/22   Court Vences MD   levothyroxine (SYNTHROID, LEVOTHROID) 50 MCG tablet Take 1 tablet by mouth Daily.    Court Vences MD   loperamide (IMODIUM) 2 MG capsule Take 2 mg by mouth 4 (Four) Times a Day As Needed for Diarrhea. 3/13/20   Court Vences MD   metoprolol succinate XL (TOPROL-XL) 50 MG 24 hr tablet Take 50 mg by mouth Daily.    Court Vences MD   Xjqoo-Kwkgr-Ritxhme-Pramoxine (Neosporin + Pain Relief Max St) 1 % ointment Apply 1 application topically to the appropriate area as directed 2 (Two) Times a Day As Needed (sores). 7/2/20   Court Vences MD   potassium chloride (KLOR-CON M20) 20 MEQ CR tablet Take 2 tablets by mouth Daily. 5/22/24   Mariangel Stearns APRN   predniSONE (DELTASONE) 5 MG tablet Take 5 mg by mouth Daily.    Court Vences MD   Salicylic Acid-Urea (KERASAL EX) Apply 1 application topically to the appropriate area as directed 2 (Two) Times a Day As Needed (dry feet). 11/23/19   Court Vences MD   tacrolimus (PROGRAF) 1 MG capsule Take 1 mg by mouth 2 (Two) Times a Day.    Court Vences MD       Allergies:  Zolpidem    Scheduled Meds:apixaban, 5 mg, Oral, Q12H  budesonide, 0.5 mg, Nebulization, BID - RT  cholestyramine light, 1 packet, Oral, Daily  dexAMETHasone, 4 mg, Oral, Daily With Breakfast  dilTIAZem, 90 mg, Oral, Q8H  docusate sodium, 100 mg, Oral, BID  DULoxetine, 40 mg, Oral, Daily  empagliflozin, 25 mg, Oral, Daily  ferrous sulfate, 324 mg, Oral, Daily With Breakfast  furosemide, 80 mg, Oral, BID  guaiFENesin, 1,200 mg, Oral,  "BID  ipratropium-albuterol, 3 mL, Nebulization, 4x Daily - RT  levothyroxine, 50 mcg, Oral, Q AM  metoclopramide, 5 mg, Oral, 4x Daily AC & at Bedtime  metOLazone, 5 mg, Oral, Once per day on Monday Wednesday Friday  metoprolol succinate XL, 50 mg, Oral, Daily  polyethylene glycol, 17 g, Oral, Daily  potassium chloride, 40 mEq, Oral, Daily  predniSONE, 5 mg, Oral, Daily With Breakfast  sodium chloride, 10 mL, Intravenous, Q12H  tacrolimus, 1 mg, Oral, BID      Continuous Infusions:   PRN Meds:.  acetaminophen    Calcium Replacement - Follow Nurse / BPA Driven Protocol    carboxymethylcellulose sod    diazePAM    Diclofenac Sodium    hydrALAZINE    HYDROcodone-acetaminophen    loperamide    Magnesium Standard Dose Replacement - Follow Nurse / BPA Driven Protocol    nitroglycerin    ondansetron ODT **OR** ondansetron    Phosphorus Replacement - Follow Nurse / BPA Driven Protocol    Potassium Replacement - Follow Nurse / BPA Driven Protocol    [COMPLETED] Insert Peripheral IV **AND** sodium chloride    sodium chloride    sodium chloride      OBJECTIVE    Vital Signs  Vitals:    06/01/24 1957 06/02/24 0028 06/02/24 0300 06/02/24 0501   BP: 132/59 137/59 125/68    BP Location: Left arm Left arm Left arm    Patient Position: Lying Lying Lying    Pulse: 100 72 78 84   Resp: 24 21 20    Temp:  98.5 °F (36.9 °C) 98 °F (36.7 °C)    TempSrc: Oral Oral Oral    SpO2: 97% 99% 96% 99%   Weight:       Height:           Flowsheet Rows      Flowsheet Row First Filed Value   Admission Height 167.6 cm (66\") Documented at 05/24/2024 1143   Admission Weight 86.2 kg (190 lb) Documented at 05/24/2024 1143              Intake/Output Summary (Last 24 hours) at 6/2/2024 0723  Last data filed at 6/2/2024 0028  Gross per 24 hour   Intake 600 ml   Output 2500 ml   Net -1900 ml          Telemetry: Atrial fibrillation with heart rate in the 60s and 70s    Physical Exam:  The patient is alert, oriented and in no distress.  Obese  Vital signs as " noted above.  Head and neck revealed no carotid bruits or jugular venous distention.  No thyromegaly or lymphadenopathy is present  Lungs clear.  No wheezing.  Breath sounds are normal bilaterally.  Heart normal first and second heart sounds.  No murmur. No precordial rub is present.  No gallop is present.  Abdomen soft and nontender.  No organomegaly is present.  Extremities with good peripheral pulses without any pedal edema.  Skin warm and dry.  Musculoskeletal system is grossly normal.  CNS grossly normal.       Results Review:  I have personally reviewed the results from the time of this admission to 6/2/2024 07:23 EDT and agree with these findings:  []  Laboratory  []  Microbiology  []  Radiology  []  EKG/Telemetry   []  Cardiology/Vascular   []  Pathology  []  Old records  []  Other:    Most notable findings include:    Lab Results (last 24 hours)       Procedure Component Value Units Date/Time    Basic Metabolic Panel [[id number]]  (Abnormal) Collected: 06/02/24 0130    Specimen: Blood from Arm, Left Updated: 06/02/24 0226     Glucose 154 mg/dL      BUN 35 mg/dL      Creatinine 1.00 mg/dL      Sodium 136 mmol/L      Potassium 4.0 mmol/L      Chloride 98 mmol/L      CO2 25.8 mmol/L      Calcium 10.3 mg/dL      BUN/Creatinine Ratio 35.0     Anion Gap 12.2 mmol/L      eGFR 58.1 mL/min/1.73     Narrative:      GFR Normal >60  Chronic Kidney Disease <60  Kidney Failure <15    The GFR formula is only valid for adults with stable renal function between ages 18 and 70.    CBC & Differential [[id number]]  (Abnormal) Collected: 06/02/24 0130    Specimen: Blood from Arm, Left Updated: 06/02/24 0158    Narrative:      The following orders were created for panel order CBC & Differential.  Procedure                               Abnormality         Status                     ---------                               -----------         ------                     CBC Auto Differential[[id number]]        Abnormal             Final result                 Please view results for these tests on the individual orders.    CBC Auto Differential [[id number]]  (Abnormal) Collected: 06/02/24 0130    Specimen: Blood from Arm, Left Updated: 06/02/24 0158     WBC 9.85 10*3/mm3      RBC 3.58 10*6/mm3      Hemoglobin 10.6 g/dL      Hematocrit 35.0 %      MCV 97.8 fL      MCH 29.6 pg      MCHC 30.3 g/dL      RDW 17.1 %      RDW-SD 61.4 fl      MPV 9.4 fL      Platelets 195 10*3/mm3      Neutrophil % 85.5 %      Lymphocyte % 4.8 %      Monocyte % 8.4 %      Eosinophil % 0.0 %      Basophil % 0.1 %      Immature Grans % 1.2 %      Neutrophils, Absolute 8.42 10*3/mm3      Lymphocytes, Absolute 0.47 10*3/mm3      Monocytes, Absolute 0.83 10*3/mm3      Eosinophils, Absolute 0.00 10*3/mm3      Basophils, Absolute 0.01 10*3/mm3      Immature Grans, Absolute 0.12 10*3/mm3      nRBC 0.0 /100 WBC     Potassium [[id number]]  (Normal) Collected: 06/01/24 1743    Specimen: Blood from Arm, Left Updated: 06/01/24 1809     Potassium 4.1 mmol/L     Basic Metabolic Panel [[id number]]  (Abnormal) Collected: 06/01/24 0750    Specimen: Blood Updated: 06/01/24 0853     Glucose 139 mg/dL      BUN 30 mg/dL      Creatinine 1.06 mg/dL      Sodium 139 mmol/L      Potassium 3.5 mmol/L      Chloride 92 mmol/L      CO2 33.3 mmol/L      Calcium 10.6 mg/dL      BUN/Creatinine Ratio 28.3     Anion Gap 13.7 mmol/L      eGFR 54.2 mL/min/1.73     Narrative:      GFR Normal >60  Chronic Kidney Disease <60  Kidney Failure <15    The GFR formula is only valid for adults with stable renal function between ages 18 and 70.    Magnesium [[id number]]  (Normal) Collected: 06/01/24 0750    Specimen: Blood Updated: 06/01/24 0853     Magnesium 1.9 mg/dL     CBC & Differential [[id number]]  (Abnormal) Collected: 06/01/24 0750    Specimen: Blood Updated: 06/01/24 0822    Narrative:      The following orders were created for panel order CBC & Differential.  Procedure                                Abnormality         Status                     ---------                               -----------         ------                     CBC Auto Differential[[id number]]        Abnormal            Final result                 Please view results for these tests on the individual orders.    CBC Auto Differential [[id number]]  (Abnormal) Collected: 06/01/24 0750    Specimen: Blood Updated: 06/01/24 0822     WBC 10.36 10*3/mm3      RBC 3.67 10*6/mm3      Hemoglobin 10.9 g/dL      Hematocrit 35.9 %      MCV 97.8 fL      MCH 29.7 pg      MCHC 30.4 g/dL      RDW 17.3 %      RDW-SD 62.4 fl      MPV 9.8 fL      Platelets 253 10*3/mm3      Neutrophil % 85.8 %      Lymphocyte % 6.5 %      Monocyte % 7.0 %      Eosinophil % 0.0 %      Basophil % 0.1 %      Immature Grans % 0.6 %      Neutrophils, Absolute 8.89 10*3/mm3      Lymphocytes, Absolute 0.67 10*3/mm3      Monocytes, Absolute 0.73 10*3/mm3      Eosinophils, Absolute 0.00 10*3/mm3      Basophils, Absolute 0.01 10*3/mm3      Immature Grans, Absolute 0.06 10*3/mm3      nRBC 0.0 /100 WBC             Imaging Results (Last 24 Hours)       ** No results found for the last 24 hours. **            LAB RESULTS (LAST 7 DAYS)    CBC  Results from last 7 days   Lab Units 06/02/24  0130 06/01/24  0750 05/31/24  0538 05/30/24  0503 05/29/24  0446 05/28/24  0450 05/27/24  0526   WBC 10*3/mm3 9.85 10.36 7.21 5.59 5.68 6.01 6.34   RBC 10*6/mm3 3.58* 3.67* 3.70* 3.06* 3.03* 3.31* 3.55*   HEMOGLOBIN g/dL 10.6* 10.9* 10.5* 9.0* 8.8* 9.4* 10.2*   HEMATOCRIT % 35.0 35.9 36.0 30.7* 30.6* 33.7* 36.0   MCV fL 97.8* 97.8* 97.3* 100.3* 101.0* 101.8* 101.4*   PLATELETS 10*3/mm3 195 253 179 149 152 161 168       BMP  Results from last 7 days   Lab Units 06/02/24  0130 06/01/24  1743 06/01/24  0750 05/31/24  2048 05/31/24  0538 05/30/24  0503 05/29/24  0446 05/28/24  0450 05/27/24  0526   SODIUM mmol/L 136  --  139  --  141 140 141 141 140   POTASSIUM mmol/L 4.0 4.1 3.5 3.6 3.5 3.8 4.3 4.6 4.3    CHLORIDE mmol/L 98  --  92*  --  91* 99 101 99 97*   CO2 mmol/L 25.8  --  33.3*  --  37.9* 33.6* 33.6* 35.9* 34.6*   BUN mg/dL 35*  --  30*  --  17 15 16 19 20   CREATININE mg/dL 1.00  --  1.06*  --  0.99 0.75 0.76 0.84 0.89   GLUCOSE mg/dL 154*  --  139*  --  130* 91 94 98 105*   MAGNESIUM mg/dL  --   --  1.9  --   --   --   --   --  1.9       CMP   Results from last 7 days   Lab Units 06/02/24  0130 06/01/24  1743 06/01/24  0750 05/31/24  2048 05/31/24  0538 05/30/24  0503 05/29/24  0446 05/28/24  0450 05/27/24  0526   SODIUM mmol/L 136  --  139  --  141 140 141 141 140   POTASSIUM mmol/L 4.0 4.1 3.5 3.6 3.5 3.8 4.3 4.6 4.3   CHLORIDE mmol/L 98  --  92*  --  91* 99 101 99 97*   CO2 mmol/L 25.8  --  33.3*  --  37.9* 33.6* 33.6* 35.9* 34.6*   BUN mg/dL 35*  --  30*  --  17 15 16 19 20   CREATININE mg/dL 1.00  --  1.06*  --  0.99 0.75 0.76 0.84 0.89   GLUCOSE mg/dL 154*  --  139*  --  130* 91 94 98 105*       BNP        TROPONIN          CoAg          Creatinine Clearance  Estimated Creatinine Clearance: 48 mL/min (by C-G formula based on SCr of 1 mg/dL).    ABG          Radiology  XR Chest 1 View    Result Date: 5/31/2024  Impression: Findings suggest CHF with mild pulmonary edema and small effusions although a component of underlying chronic lung disease is thought to be present as well. Electronically Signed: Danna Carrera MD  5/31/2024 12:49 PM EDT  Workstation ID: [id number]       EKG  I personally viewed and interpreted the patient's EKG/Telemetry data:  ECG 12 Lead Dyspnea   Final Result   HEART RATE= 82  bpm   RR Interval= 732  ms   NH Interval=   ms   P Horizontal Axis=   deg   P Front Axis=   deg   QRSD Interval= 88  ms   QT Interval= 407  ms   QTcB= 476  ms   QRS Axis= -32  deg   T Wave Axis= 49  deg   - ABNORMAL ECG -   Atrial fibrillation   Left axis deviation   When compared with ECG of 20-May-2024 9:37:54,   Significant rate increase   Significant repolarization change   Electronically Signed By:  Praneeth Theodore ([facility]) 25-May-2024 07:53:06   Date and Time of Study: 2024-05-24 11:36:06      Telemetry Scan   Final Result      Telemetry Scan   Final Result      Telemetry Scan   Final Result      Telemetry Scan   Final Result      Telemetry Scan   Final Result      Telemetry Scan   Final Result      Telemetry Scan   Final Result      Telemetry Scan   Final Result      Telemetry Scan   Final Result      Telemetry Scan   Final Result      Telemetry Scan   Final Result      Telemetry Scan   Final Result      Telemetry Scan   Final Result      Telemetry Scan   Final Result      Telemetry Scan   Final Result      Telemetry Scan   Final Result      Telemetry Scan   Final Result      Telemetry Scan   Final Result      Telemetry Scan   Final Result      Telemetry Scan   Final Result      Telemetry Scan   Final Result      Telemetry Scan   Final Result      Telemetry Scan   Final Result            Echocardiogram:    Results for orders placed during the hospital encounter of 05/17/24    Adult Transthoracic Echo Complete W/ Cont if Necessary Per Protocol    Interpretation Summary    Left ventricular systolic function is normal. Calculated left ventricular EF = 55% Left ventricular ejection fraction appears to be 51 - 55%.    Left ventricular diastolic function was indeterminate.    Left atrial volume is moderately increased.    The right atrial cavity is dilated.    Severe tricuspid valve regurgitation is present.    Estimated right ventricular systolic pressure from tricuspid regurgitation is markedly elevated (>55 mmHg).    Mild to moderate mitral valve regurgitation is present        Stress Test:         Cardiac Catheterization:  Results for orders placed during the hospital encounter of 08/11/22    Cardiac Catheterization/Vascular Study    Conclusion  IMPRESSIONS  Non obstructive CAD         Other:         ASSESSMENT & PLAN:    Principal Problem:    Acute exacerbation of CHF (congestive heart failure)      Atrial  fibrillation  [id number] score is 5  Continue Eliquis for stroke prevention  Heart rate is well-controlled on Toprol-XL and Cardizem.  Unable to take Cardizem CD due to interaction with immunosuppressants.  TSH is normal     HFpEF/Pulmonary hypertension  Presented with proBNP of 4000 (compared to 8000 during previous admission)  Completed IV diuretics.  Now on Lasix 80 mg p.o. twice daily and metolazone 3 times a week.  Monitor I's and O's and replace electrolytes as needed  Continue Toprol-XL  Continue Jardiance  Echocardiogram shows preserved LV function with severe tricuspid valve regurgitation and severe pulmonary hypertension.  She weighs 159 pounds.     UTI  Completed antibiotics.     History of kidney transplant  On prednisone and Prograf  Creatinine 1 and GFR is 58.1  Continue Lasix per nephrology     COPD  History of lung cancer  CT shows minimal interval enlargement of the tissue surrounding the postsurgical changes associated with small bilateral pleural effusions.  Continue bronchodilators.  She should follow-up with an oncologist outpatient.       Jak Gavin MD  06/02/24  07:23 EDT

## 2024-06-02 NOTE — CASE MANAGEMENT/SOCIAL WORK
Continued Stay Note  DAHIANA Gay     Patient Name: Jaja Carcamo  MRN: [id number]  Today's Date: 6/2/2024    Admit Date: 5/24/2024    Plan: [facility] accepted, tanvir approved 5/30-6/3. [facility] approved.   Discharge Plan       Row Name 06/02/24 1235       Plan    Plan Comments continue on 4L NC.  secure chat sent to nursing to inquire if something specific was keeping patient in the hospital. pending response.                      Expected Discharge Date and Time       Expected Discharge Date Expected Discharge Time    Jun 1, 2024               Lana Richardson RN

## 2024-06-02 NOTE — PROGRESS NOTES
"RENAL/KCC:     LOS: 9 days    Patient Care Team:  Jonathan Luna APRN as PCP - General (Family Medicine)  Jak Gavin MD as Cardiologist (Cardiology)    Chief Complaint:  Fluid overload    Subjective     Interval History:   Chart reviewed  Good UOP with improved edema      Objective     Vital Sign Min/Max for last 24 hours  Temp  Min: 98 °F (36.7 °C)  Max: 98.5 °F (36.9 °C)   BP  Min: 116/60  Max: 137/59   Pulse  Min: 72  Max: 100   Resp  Min: 18  Max: 29   SpO2  Min: 90 %  Max: 100 %   Flow (L/min)  Min: 4  Max: 4   No data recorded     Flowsheet Rows      Flowsheet Row First Filed Value   Admission Height 167.6 cm (66\") Documented at 05/24/2024 1143   Admission Weight 86.2 kg (190 lb) Documented at 05/24/2024 1143            No intake/output data recorded.  I/O last 3 completed shifts:  In: 600 [P.O.:600]  Out: 5700 [Urine:5700]    Physical Exam:  GEN: Awake, NAD  ENT: PERRL, EOMI, MMM  NECK: Supple, no JVD  CHEST: CTAB, no W/R/C  CV: RRR, no M/G/R, +edema  ABD: Soft, NT, +BS  SKIN: Warm and Dry  NEURO: CN's intact      WBC WBC   Date Value Ref Range Status   06/02/2024 9.85 3.40 - 10.80 10*3/mm3 Final   06/01/2024 10.36 3.40 - 10.80 10*3/mm3 Final   05/31/2024 7.21 3.40 - 10.80 10*3/mm3 Final      HGB Hemoglobin   Date Value Ref Range Status   06/02/2024 10.6 (L) 12.0 - 15.9 g/dL Final   06/01/2024 10.9 (L) 12.0 - 15.9 g/dL Final   05/31/2024 10.5 (L) 12.0 - 15.9 g/dL Final      HCT Hematocrit   Date Value Ref Range Status   06/02/2024 35.0 34.0 - 46.6 % Final   06/01/2024 35.9 34.0 - 46.6 % Final   05/31/2024 36.0 34.0 - 46.6 % Final      Platlets No results found for: \"LABPLAT\"   MCV MCV   Date Value Ref Range Status   06/02/2024 97.8 (H) 79.0 - 97.0 fL Final   06/01/2024 97.8 (H) 79.0 - 97.0 fL Final   05/31/2024 97.3 (H) 79.0 - 97.0 fL Final          Sodium Sodium   Date Value Ref Range Status   06/02/2024 136 136 - 145 mmol/L Final   06/01/2024 139 136 - 145 mmol/L Final   05/31/2024 141 136 - 145 mmol/L " "Final      Potassium Potassium   Date Value Ref Range Status   06/02/2024 4.0 3.5 - 5.2 mmol/L Final   06/01/2024 4.1 3.5 - 5.2 mmol/L Final   06/01/2024 3.5 3.5 - 5.2 mmol/L Final   05/31/2024 3.6 3.5 - 5.2 mmol/L Final   05/31/2024 3.5 3.5 - 5.2 mmol/L Final      Chloride Chloride   Date Value Ref Range Status   06/02/2024 98 98 - 107 mmol/L Final   06/01/2024 92 (L) 98 - 107 mmol/L Final   05/31/2024 91 (L) 98 - 107 mmol/L Final      CO2 CO2   Date Value Ref Range Status   06/02/2024 25.8 22.0 - 29.0 mmol/L Final   06/01/2024 33.3 (H) 22.0 - 29.0 mmol/L Final   05/31/2024 37.9 (H) 22.0 - 29.0 mmol/L Final      BUN BUN   Date Value Ref Range Status   06/02/2024 35 (H) 8 - 23 mg/dL Final   06/01/2024 30 (H) 8 - 23 mg/dL Final   05/31/2024 17 8 - 23 mg/dL Final      Creatinine Creatinine   Date Value Ref Range Status   06/02/2024 1.00 0.57 - 1.00 mg/dL Final   06/01/2024 1.06 (H) 0.57 - 1.00 mg/dL Final   05/31/2024 0.99 0.57 - 1.00 mg/dL Final      Calcium Calcium   Date Value Ref Range Status   06/02/2024 10.3 8.6 - 10.5 mg/dL Final   06/01/2024 10.6 (H) 8.6 - 10.5 mg/dL Final   05/31/2024 10.7 (H) 8.6 - 10.5 mg/dL Final      PO4 No results found for: \"CAPO4\"   Albumin No results found for: \"ALBUMIN\"   Magnesium Magnesium   Date Value Ref Range Status   06/01/2024 1.9 1.6 - 2.4 mg/dL Final        Uric Acid No results found for: \"URICACID\"        Results Review:     I reviewed the patient's new clinical results.    apixaban, 5 mg, Oral, Q12H  budesonide, 0.5 mg, Nebulization, BID - RT  cholestyramine light, 1 packet, Oral, Daily  dexAMETHasone, 4 mg, Oral, Daily With Breakfast  dilTIAZem, 90 mg, Oral, Q8H  docusate sodium, 100 mg, Oral, BID  DULoxetine, 40 mg, Oral, Daily  empagliflozin, 25 mg, Oral, Daily  ferrous sulfate, 324 mg, Oral, Daily With Breakfast  furosemide, 80 mg, Oral, BID  guaiFENesin, 1,200 mg, Oral, BID  ipratropium-albuterol, 3 mL, Nebulization, 4x Daily - RT  levothyroxine, 50 mcg, Oral, Q " AM  metoclopramide, 5 mg, Oral, 4x Daily AC & at Bedtime  metOLazone, 5 mg, Oral, Once per day on Monday Wednesday Friday  metoprolol succinate XL, 50 mg, Oral, Daily  polyethylene glycol, 17 g, Oral, Daily  potassium chloride, 40 mEq, Oral, Daily  predniSONE, 5 mg, Oral, Daily With Breakfast  sodium chloride, 10 mL, Intravenous, Q12H  tacrolimus, 1 mg, Oral, BID             Medication Review: Reviewed    Assessment & Plan     TONYA    ESRD - s/p DDKT     Chronic immunoRx     Fluid overload/CHF exacerbation     Atrial fibrillation     History of COPD     History of lung cancer     Hypokalemia    Metabolic alkalosis     Plan:  Cr stable at 1.0 from 1.06 with continued good UOP  K WNL  Edema much improved  Transitioned back to BID PO Lasix and MWF Metolazone  CO2 improved s/p Diamox  On fluid restriction  On Jardiance  Dispo per primary  Will follow      Naun Falcon MD  [facility]  06/02/24  09:00 EDT

## 2024-06-03 VITALS
TEMPERATURE: 98.8 F | HEIGHT: 66 IN | WEIGHT: 157.19 LBS | HEART RATE: 105 BPM | RESPIRATION RATE: 18 BRPM | SYSTOLIC BLOOD PRESSURE: 138 MMHG | DIASTOLIC BLOOD PRESSURE: 65 MMHG | BODY MASS INDEX: 25.26 KG/M2 | OXYGEN SATURATION: 94 %

## 2024-06-03 LAB
ANION GAP SERPL CALCULATED.3IONS-SCNC: 9.3 MMOL/L (ref 5–15)
BASOPHILS # BLD AUTO: 0.01 10*3/MM3 (ref 0–0.2)
BASOPHILS NFR BLD AUTO: 0.1 % (ref 0–1.5)
BUN SERPL-MCNC: 27 MG/DL (ref 8–23)
BUN/CREAT SERPL: 35.5 (ref 7–25)
CALCIUM SPEC-SCNC: 10.3 MG/DL (ref 8.6–10.5)
CHLORIDE SERPL-SCNC: 98 MMOL/L (ref 98–107)
CO2 SERPL-SCNC: 27.7 MMOL/L (ref 22–29)
CREAT SERPL-MCNC: 0.76 MG/DL (ref 0.57–1)
DEPRECATED RDW RBC AUTO: 60.3 FL (ref 37–54)
EGFRCR SERPLBLD CKD-EPI 2021: 80.8 ML/MIN/1.73
EOSINOPHIL # BLD AUTO: 0 10*3/MM3 (ref 0–0.4)
EOSINOPHIL NFR BLD AUTO: 0 % (ref 0.3–6.2)
ERYTHROCYTE [DISTWIDTH] IN BLOOD BY AUTOMATED COUNT: 17 % (ref 12.3–15.4)
GLUCOSE SERPL-MCNC: 151 MG/DL (ref 65–99)
HCT VFR BLD AUTO: 35.2 % (ref 34–46.6)
HGB BLD-MCNC: 10.7 G/DL (ref 12–15.9)
IMM GRANULOCYTES # BLD AUTO: 0.13 10*3/MM3 (ref 0–0.05)
IMM GRANULOCYTES NFR BLD AUTO: 0.9 % (ref 0–0.5)
LYMPHOCYTES # BLD AUTO: 0.33 10*3/MM3 (ref 0.7–3.1)
LYMPHOCYTES NFR BLD AUTO: 2.4 % (ref 19.6–45.3)
MCH RBC QN AUTO: 29.3 PG (ref 26.6–33)
MCHC RBC AUTO-ENTMCNC: 30.4 G/DL (ref 31.5–35.7)
MCV RBC AUTO: 96.4 FL (ref 79–97)
MONOCYTES # BLD AUTO: 1.05 10*3/MM3 (ref 0.1–0.9)
MONOCYTES NFR BLD AUTO: 7.7 % (ref 5–12)
NEUTROPHILS NFR BLD AUTO: 12.19 10*3/MM3 (ref 1.7–7)
NEUTROPHILS NFR BLD AUTO: 88.9 % (ref 42.7–76)
NRBC BLD AUTO-RTO: 0.1 /100 WBC (ref 0–0.2)
PLATELET # BLD AUTO: 222 10*3/MM3 (ref 140–450)
PMV BLD AUTO: 9.9 FL (ref 6–12)
POTASSIUM SERPL-SCNC: 3.7 MMOL/L (ref 3.5–5.2)
RBC # BLD AUTO: 3.65 10*6/MM3 (ref 3.77–5.28)
SODIUM SERPL-SCNC: 135 MMOL/L (ref 136–145)
WBC NRBC COR # BLD AUTO: 13.71 10*3/MM3 (ref 3.4–10.8)

## 2024-06-03 PROCEDURE — 63710000001 PREDNISONE PER 5 MG: Performed by: INTERNAL MEDICINE

## 2024-06-03 PROCEDURE — 94761 N-INVAS EAR/PLS OXIMETRY MLT: CPT

## 2024-06-03 PROCEDURE — 63710000001 DEXAMETHASONE PER 0.25 MG: Performed by: INTERNAL MEDICINE

## 2024-06-03 PROCEDURE — 80048 BASIC METABOLIC PNL TOTAL CA: CPT | Performed by: INTERNAL MEDICINE

## 2024-06-03 PROCEDURE — 85025 COMPLETE CBC W/AUTO DIFF WBC: CPT | Performed by: INTERNAL MEDICINE

## 2024-06-03 PROCEDURE — 99232 SBSQ HOSP IP/OBS MODERATE 35: CPT | Performed by: INTERNAL MEDICINE

## 2024-06-03 PROCEDURE — 94799 UNLISTED PULMONARY SVC/PX: CPT

## 2024-06-03 PROCEDURE — 94664 DEMO&/EVAL PT USE INHALER: CPT

## 2024-06-03 PROCEDURE — 63710000001 TACROLIMUS PER 1 MG: Performed by: INTERNAL MEDICINE

## 2024-06-03 RX ORDER — DIAZEPAM 5 MG/1
5 TABLET ORAL 2 TIMES DAILY PRN
Qty: 30 TABLET | Refills: 0 | Status: SHIPPED | OUTPATIENT
Start: 2024-06-03 | End: 2024-06-05

## 2024-06-03 RX ORDER — METOLAZONE 5 MG/1
5 TABLET ORAL 3 TIMES WEEKLY
Qty: 12 TABLET | Refills: 0 | Status: SHIPPED | OUTPATIENT
Start: 2024-06-03 | End: 2024-06-05

## 2024-06-03 RX ADMIN — PREDNISONE 5 MG: 5 TABLET ORAL at 09:05

## 2024-06-03 RX ADMIN — METOLAZONE 5 MG: 5 TABLET ORAL at 09:06

## 2024-06-03 RX ADMIN — GUAIFENESIN 1200 MG: 600 TABLET, EXTENDED RELEASE ORAL at 09:05

## 2024-06-03 RX ADMIN — CHOLESTYRAMINE 4 G: 4 POWDER, FOR SUSPENSION ORAL at 09:05

## 2024-06-03 RX ADMIN — DIAZEPAM 5 MG: 5 TABLET ORAL at 06:33

## 2024-06-03 RX ADMIN — TACROLIMUS 1 MG: 1 CAPSULE ORAL at 09:09

## 2024-06-03 RX ADMIN — POTASSIUM CHLORIDE 40 MEQ: 1500 TABLET, EXTENDED RELEASE ORAL at 09:05

## 2024-06-03 RX ADMIN — Medication 10 ML: at 09:30

## 2024-06-03 RX ADMIN — LEVOTHYROXINE SODIUM 50 MCG: 0.05 TABLET ORAL at 06:34

## 2024-06-03 RX ADMIN — FERROUS SULFATE TAB EC 324 MG (65 MG FE EQUIVALENT) 324 MG: 324 (65 FE) TABLET DELAYED RESPONSE at 09:07

## 2024-06-03 RX ADMIN — DULOXETINE HYDROCHLORIDE 40 MG: 20 CAPSULE, DELAYED RELEASE ORAL at 09:09

## 2024-06-03 RX ADMIN — METOPROLOL SUCCINATE 50 MG: 50 TABLET, EXTENDED RELEASE ORAL at 09:06

## 2024-06-03 RX ADMIN — DEXAMETHASONE 4 MG: 4 TABLET ORAL at 09:06

## 2024-06-03 RX ADMIN — HYDROCODONE BITARTRATE AND ACETAMINOPHEN 1 TABLET: 5; 325 TABLET ORAL at 06:34

## 2024-06-03 RX ADMIN — APIXABAN 5 MG: 5 TABLET, FILM COATED ORAL at 09:06

## 2024-06-03 RX ADMIN — FUROSEMIDE 80 MG: 40 TABLET ORAL at 09:06

## 2024-06-03 RX ADMIN — METOCLOPRAMIDE 5 MG: 5 TABLET ORAL at 09:05

## 2024-06-03 RX ADMIN — DOCUSATE SODIUM 100 MG: 100 CAPSULE, LIQUID FILLED ORAL at 09:06

## 2024-06-03 RX ADMIN — DILTIAZEM HYDROCHLORIDE 90 MG: 60 TABLET, FILM COATED ORAL at 06:33

## 2024-06-03 RX ADMIN — BUDESONIDE 0.5 MG: 0.5 INHALANT RESPIRATORY (INHALATION) at 06:35

## 2024-06-03 RX ADMIN — POLYETHYLENE GLYCOL 3350 17 G: 17 POWDER, FOR SOLUTION ORAL at 09:06

## 2024-06-03 RX ADMIN — EMPAGLIFLOZIN 25 MG: 25 TABLET, FILM COATED ORAL at 09:06

## 2024-06-03 NOTE — PROGRESS NOTES
Referring Provider: Juanis Lowe MD    Reason for follow-up: Shortness of breath     Patient Care Team:  Jonathan Luna APRN as PCP - General (Family Medicine)  Jak Gavin MD as Cardiologist (Cardiology)      SUBJECTIVE  Resting comfortably in bed.  Now on 3 L of oxygen.     ROS  Review of all systems negative except as indicated.    Since I have last seen, the patient has been without any chest discomfort, shortness of breath, palpitations, dizziness or syncope.  Denies having any headache, abdominal pain, nausea, vomiting, diarrhea, constipation, loss of weight or loss of appetite.  Denies having any excessive bruising, hematuria or blood in the stool.  ROS      Personal History:    Past Medical History:   Diagnosis Date    Allergic rhinitis 04/14/2015    Asthma 08/10/2017    Atrial flutter 05/11/2017    Chronic diarrhea 04/15/2019    Chronic hypoxemic respiratory failure 01/18/2024    Chronic pain 02/03/2015    COPD (chronic obstructive pulmonary disease)     COVID-19 virus detected 08/11/2022    Cytokine release syndrome, grade 1 08/16/2022    Edema of both lower extremities due to peripheral venous insufficiency 03/12/2021    ESRD on hemodialysis 05/22/2013    Essential (primary) hypertension 03/03/2022    Fracture of ulnar styloid 05/19/2022    Fracture of unspecified carpal bone, right wrist, subsequent encounter for fracture with routine healing 03/03/2022    Gastroesophageal reflux disease 11/12/2020    Gout 08/10/2017    Hearing loss 08/10/2017    History of appendectomy     History of DVT (deep vein thrombosis) 11/12/2020    History of kidney transplant 06/30/2017    History of lobectomy of lung 01/18/2024 03/08/2016:  RIGHT Lower Lobe Mass--> Right Video-assisted thoracoscopy with a moderate-to-large wedge resection of the RLL (by Dr. Haris Mcconnell @ [facility])--> Poorly differentiated carcinoma of the RLL.      History of repair of hip joint 05/21/2013    History of suicide attempt  05/20/2024    Hyperlipidemia 08/11/2014    Hypothyroidism, unspecified 03/03/2022    Infection due to extended spectrum beta lactamase (ESBL) producing bacteria 03/11/2016    No A2K system hx. +ESBL E coli urine on 3/11/16.      Irritable bowel syndrome 04/15/2019    Long term (current) use of immunosuppressive biologic 04/28/2021    Long term current use of anticoagulant therapy 01/18/2024    Malignant neoplasm of lung 08/10/2017    Menopausal flushing 08/30/2021    Mitral valve regurgitation 07/06/2015    Mixed anxiety and depressive disorder 04/30/2015    Non-small cell lung cancer 08/10/2017    Nonobstructive atherosclerosis of coronary artery 06/02/2019 08/12/2022: CATH: [facility]: NSTEMI assoc with Covid-19: LM:-nl;  LAD: diffuse, Ca++; 30%; CIRC: Dominant. Normal; RCA: small; 50% diffuse, proximal and mid-segment.      NSTEMI (non-ST elevated myocardial infarction) 08/11/2022    Obsessive-compulsive disorder 04/15/2019    Osteoarthritis 05/20/2024    Paroxysmal atrial fibrillation 05/11/2017    Paroxysmal supraventricular tachycardia 05/11/2017    Peripheral neuropathy 08/11/2014    Personal history of peptic ulcer disease 11/12/2020    Postoperative anemia due to acute blood loss 05/22/2013    Right wrist fracture 03/01/2022    Seasonal allergies 08/10/2017    Secondary hyperparathyroidism of renal origin 09/14/2015    Tricuspid valve regurgitation 03/15/2017    Ulcer of lower extremity 08/30/2021    Unspecified acute appendicitis 03/03/2022    Valvular heart disease 05/20/2024    Wegener's granulomatosis with renal involvement 03/03/2022       Past Surgical History:   Procedure Laterality Date    APPENDECTOMY      BREAST SURGERY Left     cysts rmeoved    CARDIAC CATHETERIZATION Right 08/12/2022    Procedure: Left Heart Cath and coronary angiogram;  Surgeon: Jak Gavin MD;  Location: [facility] INVASIVE LOCATION;  Service: Cardiology;  Laterality: Right;    CLOSED REDUCTION WRIST FRACTURE Right  03/01/2022    Procedure: WRIST CLOSED REDUCTION;  Surgeon: Gaudencio Townsend MD;  Location: [facility] MAIN OR;  Service: Orthopedics;  Laterality: Right;    CYSTOSCOPY      HIP ARTHROPLASTY      HYSTERECTOMY      LUNG LOBECTOMY Right     TRANSPLANTATION RENAL         Family History   Problem Relation Age of Onset    No Known Problems Mother     No Known Problems Father        Social History     Tobacco Use    Smoking status: Former    Smokeless tobacco: Never   Vaping Use    Vaping status: Former   Substance Use Topics    Alcohol use: Never    Drug use: Never        Home meds:  Prior to Admission medications    Medication Sig Start Date End Date Taking? Authorizing Provider   acetaminophen (Tylenol) 325 MG tablet Take 2 tablets by mouth Every 4 (Four) Hours As Needed for Fever or Mild Pain. 3/13/20   Court Vences MD   albuterol sulfate  (90 Base) MCG/ACT inhaler Inhale 2 puffs Every 6 (Six) Hours As Needed for Wheezing.    Court Vences MD   apixaban (ELIQUIS) 5 MG tablet tablet Take 1 tablet by mouth Every 12 (Twelve) Hours. 8/16/22   Clyde White,    Carboxymethylcellulose Sodium (DRY EYE RELIEF OP) Apply 2 drops to eye(s) as directed by provider 2 (Two) Times a Day As Needed (dry eyes). 5/11/22   Court Vences MD   colestipol (COLESTID) 1 g tablet Take 1 tablet by mouth Daily.    Court Vences MD   diazePAM (VALIUM) 5 MG tablet Take 5 mg by mouth 2 (Two) Times a Day As Needed for Anxiety.    Court Vences MD   Diclofenac Sodium (Aspercreme Arthritis Pain) 1 % gel gel Apply 4 g topically to the appropriate area as directed 2 (Two) Times a Day As Needed (pain). 10/21/20   Court Vences MD   dilTIAZem (CARDIZEM) 90 MG tablet Take 1 tablet by mouth Every 8 (Eight) Hours. 5/22/24   Mariangel Stearns APRN   DULoxetine HCl 40 MG capsule delayed-release particles Take 1 capsule by mouth Daily.    Court Vences MD   ferrous sulfate 324 (65 Fe) MG  tablet delayed-release EC tablet Take 1 tablet by mouth Daily With Breakfast. 5/23/24   Mariangel Stearns APRN   furosemide (LASIX) 80 MG tablet Take 1 tablet by mouth 2 (Two) Times a Day. 5/22/24   Mariangel Stearns APRN   guaiFENesin (Mucinex) 600 MG 12 hr tablet Take 1,200 mg by mouth 2 (Two) Times a Day. 8/16/22   Court Vences MD   levothyroxine (SYNTHROID, LEVOTHROID) 50 MCG tablet Take 1 tablet by mouth Daily.    Court Vences MD   loperamide (IMODIUM) 2 MG capsule Take 2 mg by mouth 4 (Four) Times a Day As Needed for Diarrhea. 3/13/20   Court Vences MD   metoprolol succinate XL (TOPROL-XL) 50 MG 24 hr tablet Take 50 mg by mouth Daily.    Court Vences MD   Ygbyw-Heqvd-Idjazvg-Pramoxine (Neosporin + Pain Relief Max St) 1 % ointment Apply 1 application topically to the appropriate area as directed 2 (Two) Times a Day As Needed (sores). 7/2/20   Court Vences MD   potassium chloride (KLOR-CON M20) 20 MEQ CR tablet Take 2 tablets by mouth Daily. 5/22/24   Mariangel Stearns APRN   predniSONE (DELTASONE) 5 MG tablet Take 5 mg by mouth Daily.    Cuort Vences MD   Salicylic Acid-Urea (KERASAL EX) Apply 1 application topically to the appropriate area as directed 2 (Two) Times a Day As Needed (dry feet). 11/23/19   Court Vences MD   tacrolimus (PROGRAF) 1 MG capsule Take 1 mg by mouth 2 (Two) Times a Day.    Court Vences MD       Allergies:  Zolpidem    Scheduled Meds:apixaban, 5 mg, Oral, Q12H  budesonide, 0.5 mg, Nebulization, BID - RT  cholestyramine light, 1 packet, Oral, Daily  dexAMETHasone, 4 mg, Oral, Daily With Breakfast  dilTIAZem, 90 mg, Oral, Q8H  docusate sodium, 100 mg, Oral, BID  DULoxetine, 40 mg, Oral, Daily  empagliflozin, 25 mg, Oral, Daily  ferrous sulfate, 324 mg, Oral, Daily With Breakfast  furosemide, 80 mg, Oral, BID  guaiFENesin, 1,200 mg, Oral, BID  levothyroxine, 50 mcg, Oral, Q AM  metoclopramide, 5 mg, Oral, 4x  "Daily AC & at Bedtime  metOLazone, 5 mg, Oral, Once per day on Monday Wednesday Friday  metoprolol succinate XL, 50 mg, Oral, Daily  polyethylene glycol, 17 g, Oral, Daily  potassium chloride, 40 mEq, Oral, Daily  predniSONE, 5 mg, Oral, Daily With Breakfast  sodium chloride, 10 mL, Intravenous, Q12H  tacrolimus, 1 mg, Oral, BID      Continuous Infusions:   PRN Meds:.  acetaminophen    Calcium Replacement - Follow Nurse / BPA Driven Protocol    carboxymethylcellulose sod    diazePAM    Diclofenac Sodium    hydrALAZINE    HYDROcodone-acetaminophen    ipratropium-albuterol    loperamide    Magnesium Standard Dose Replacement - Follow Nurse / BPA Driven Protocol    nitroglycerin    ondansetron ODT **OR** ondansetron    Phosphorus Replacement - Follow Nurse / BPA Driven Protocol    Potassium Replacement - Follow Nurse / BPA Driven Protocol    [COMPLETED] Insert Peripheral IV **AND** sodium chloride    sodium chloride    sodium chloride      OBJECTIVE    Vital Signs  Vitals:    06/02/24 1936 06/02/24 2020 06/02/24 2025 06/03/24 0501   BP: 150/65   140/77   BP Location: Left arm   Left arm   Patient Position: Lying   Lying   Pulse: 102 104 105 98   Resp: 18 20 20 28   Temp: 98.5 °F (36.9 °C)   98.4 °F (36.9 °C)   TempSrc: Oral   Oral   SpO2: 97% 97% 99% 98%   Weight:    71.3 kg (157 lb 3 oz)   Height:           Flowsheet Rows      Flowsheet Row First Filed Value   Admission Height 167.6 cm (66\") Documented at 05/24/2024 1143   Admission Weight 86.2 kg (190 lb) Documented at 05/24/2024 1143              Intake/Output Summary (Last 24 hours) at 6/3/2024 0714  Last data filed at 6/3/2024 0501  Gross per 24 hour   Intake 720 ml   Output 3700 ml   Net -2980 ml          Telemetry: Atrial fibrillation with heart rate in the 60s and 70s    Physical Exam:  The patient is alert, oriented and in no distress.  Obese  Vital signs as noted above.  Head and neck revealed no carotid bruits or jugular venous distention.  No thyromegaly or " lymphadenopathy is present  Lungs clear.  No wheezing.  Breath sounds are normal bilaterally.  Heart normal first and second heart sounds.  No murmur. No precordial rub is present.  No gallop is present.  Abdomen soft and nontender.  No organomegaly is present.  Extremities with good peripheral pulses without any pedal edema.  Skin warm and dry.  Musculoskeletal system is grossly normal.  CNS grossly normal.       Results Review:  I have personally reviewed the results from the time of this admission to 6/3/2024 07:14 EDT and agree with these findings:  []  Laboratory  []  Microbiology  []  Radiology  []  EKG/Telemetry   []  Cardiology/Vascular   []  Pathology  []  Old records  []  Other:    Most notable findings include:    Lab Results (last 24 hours)       ** No results found for the last 24 hours. **            Imaging Results (Last 24 Hours)       ** No results found for the last 24 hours. **            LAB RESULTS (LAST 7 DAYS)    CBC  Results from last 7 days   Lab Units 06/02/24  0130 06/01/24  0750 05/31/24 0538 05/30/24  0503 05/29/24  0446 05/28/24  0450   WBC 10*3/mm3 9.85 10.36 7.21 5.59 5.68 6.01   RBC 10*6/mm3 3.58* 3.67* 3.70* 3.06* 3.03* 3.31*   HEMOGLOBIN g/dL 10.6* 10.9* 10.5* 9.0* 8.8* 9.4*   HEMATOCRIT % 35.0 35.9 36.0 30.7* 30.6* 33.7*   MCV fL 97.8* 97.8* 97.3* 100.3* 101.0* 101.8*   PLATELETS 10*3/mm3 195 253 179 149 152 161       BMP  Results from last 7 days   Lab Units 06/02/24  0130 06/01/24  1743 06/01/24  0750 05/31/24 2048 05/31/24 0538 05/30/24  0503 05/29/24 0446 05/28/24  0450   SODIUM mmol/L 136  --  139  --  141 140 141 141   POTASSIUM mmol/L 4.0 4.1 3.5 3.6 3.5 3.8 4.3 4.6   CHLORIDE mmol/L 98  --  92*  --  91* 99 101 99   CO2 mmol/L 25.8  --  33.3*  --  37.9* 33.6* 33.6* 35.9*   BUN mg/dL 35*  --  30*  --  17 15 16 19   CREATININE mg/dL 1.00  --  1.06*  --  0.99 0.75 0.76 0.84   GLUCOSE mg/dL 154*  --  139*  --  130* 91 94 98   MAGNESIUM mg/dL  --   --  1.9  --   --   --   --    --        CMP   Results from last 7 days   Lab Units 06/02/24  0130 06/01/24  1743 06/01/24  0750 05/31/24  2048 05/31/24  0538 05/30/24  0503 05/29/24  0446 05/28/24  0450   SODIUM mmol/L 136  --  139  --  141 140 141 141   POTASSIUM mmol/L 4.0 4.1 3.5 3.6 3.5 3.8 4.3 4.6   CHLORIDE mmol/L 98  --  92*  --  91* 99 101 99   CO2 mmol/L 25.8  --  33.3*  --  37.9* 33.6* 33.6* 35.9*   BUN mg/dL 35*  --  30*  --  17 15 16 19   CREATININE mg/dL 1.00  --  1.06*  --  0.99 0.75 0.76 0.84   GLUCOSE mg/dL 154*  --  139*  --  130* 91 94 98       BNP        TROPONIN          CoAg          Creatinine Clearance  Estimated Creatinine Clearance: 47.7 mL/min (by C-G formula based on SCr of 1 mg/dL).    ABG          Radiology  No radiology results for the last day      EKG  I personally viewed and interpreted the patient's EKG/Telemetry data:  ECG 12 Lead Dyspnea   Final Result   HEART RATE= 82  bpm   RR Interval= 732  ms   MO Interval=   ms   P Horizontal Axis=   deg   P Front Axis=   deg   QRSD Interval= 88  ms   QT Interval= 407  ms   QTcB= 476  ms   QRS Axis= -32  deg   T Wave Axis= 49  deg   - ABNORMAL ECG -   Atrial fibrillation   Left axis deviation   When compared with ECG of 20-May-2024 9:37:54,   Significant rate increase   Significant repolarization change   Electronically Signed By: Praneeth Theodore (Derek) 25-May-2024 07:53:06   Date and Time of Study: 2024-05-24 11:36:06      Telemetry Scan   Final Result      Telemetry Scan   Final Result      Telemetry Scan   Final Result      Telemetry Scan   Final Result      Telemetry Scan   Final Result      Telemetry Scan   Final Result      Telemetry Scan   Final Result      Telemetry Scan   Final Result      Telemetry Scan   Final Result      Telemetry Scan   Final Result      Telemetry Scan   Final Result      Telemetry Scan   Final Result      Telemetry Scan   Final Result      Telemetry Scan   Final Result      Telemetry Scan   Final Result      Telemetry Scan   Final Result       Telemetry Scan   Final Result      Telemetry Scan   Final Result      Telemetry Scan   Final Result      Telemetry Scan   Final Result      Telemetry Scan   Final Result      Telemetry Scan   Final Result      Telemetry Scan   Final Result      Telemetry Scan   Final Result      Telemetry Scan   Final Result      Telemetry Scan   Final Result      Telemetry Scan   Final Result      Telemetry Scan   Final Result      Telemetry Scan   Final Result            Echocardiogram:    Results for orders placed during the hospital encounter of 05/17/24    Adult Transthoracic Echo Complete W/ Cont if Necessary Per Protocol    Interpretation Summary    Left ventricular systolic function is normal. Calculated left ventricular EF = 55% Left ventricular ejection fraction appears to be 51 - 55%.    Left ventricular diastolic function was indeterminate.    Left atrial volume is moderately increased.    The right atrial cavity is dilated.    Severe tricuspid valve regurgitation is present.    Estimated right ventricular systolic pressure from tricuspid regurgitation is markedly elevated (>55 mmHg).    Mild to moderate mitral valve regurgitation is present        Stress Test:         Cardiac Catheterization:  Results for orders placed during the hospital encounter of 08/11/22    Cardiac Catheterization/Vascular Study    Conclusion  IMPRESSIONS  Non obstructive CAD         Other:         ASSESSMENT & PLAN:    Principal Problem:    Acute exacerbation of CHF (congestive heart failure)      Atrial fibrillation  [id number] score is 5  Continue Eliquis 5 mg p.o. twice daily for stroke prevention  Heart rate is well-controlled on Toprol-XL and Cardizem.  Unable to take Cardizem CD due to interaction with immunosuppressants.  TSH is normal     HFpEF/Pulmonary hypertension  Presented with proBNP of 4000 (compared to 8000 during previous admission)  Completed IV diuretics.  Now on Lasix 80 mg p.o. twice daily and metolazone 3 times a  week.  Monitor I's and O's and replace electrolytes as needed  Continue Toprol-XL  Continue Jardiance  Echocardiogram shows preserved LV function with severe tricuspid valve regurgitation and severe pulmonary hypertension.  She weighs 157 pounds.     UTI  Completed antibiotics.     History of kidney transplant  On prednisone and Prograf  Creatinine 1, GFR is 58.1  Continue Lasix per nephrology     COPD  History of lung cancer  CT shows minimal interval enlargement of the tissue surrounding the postsurgical changes associated with small bilateral pleural effusions.  Continue bronchodilators.  She should follow-up with an oncologist outpatient.    Okay to discharge from cardiac standpoint       Jak Gavin MD  06/03/24  07:14 EDT

## 2024-06-03 NOTE — PLAN OF CARE
Goal Outcome Evaluation:           Progress: improving     All inpatient goals met, Pt discharging.

## 2024-06-03 NOTE — PROGRESS NOTES
"RENAL/KCC:     LOS: 10 days    Patient Care Team:  Jonathan Luna APRN as PCP - General (Family Medicine)  Jak Gavin MD as Cardiologist (Cardiology)    Chief Complaint:  Fluid overload    Subjective     Interval History:   Chart reviewed  Good UOP with improved edema      Objective     Vital Sign Min/Max for last 24 hours  Temp  Min: 98.4 °F (36.9 °C)  Max: 98.5 °F (36.9 °C)   BP  Min: 126/68  Max: 150/65   Pulse  Min: 78  Max: 105   Resp  Min: 16  Max: 30   SpO2  Min: 97 %  Max: 100 %   Flow (L/min)  Min: 4  Max: 4   Weight  Min: 71.3 kg (157 lb 3 oz)  Max: 71.3 kg (157 lb 3 oz)     Flowsheet Rows      Flowsheet Row First Filed Value   Admission Height 167.6 cm (66\") Documented at 05/24/2024 1143   Admission Weight 86.2 kg (190 lb) Documented at 05/24/2024 1143            No intake/output data recorded.  I/O last 3 completed shifts:  In: 720 [P.O.:720]  Out: 4500 [Urine:4500]    Physical Exam:  GEN: Awake, NAD  ENT: PERRL, EOMI, MMM  NECK: Supple, no JVD  CHEST: CTAB, no W/R/C  CV: RRR, no M/G/R, +edema  ABD: Soft, NT, +BS  SKIN: Warm and Dry  NEURO: CN's intact      WBC WBC   Date Value Ref Range Status   06/02/2024 9.85 3.40 - 10.80 10*3/mm3 Final   06/01/2024 10.36 3.40 - 10.80 10*3/mm3 Final      HGB Hemoglobin   Date Value Ref Range Status   06/02/2024 10.6 (L) 12.0 - 15.9 g/dL Final   06/01/2024 10.9 (L) 12.0 - 15.9 g/dL Final      HCT Hematocrit   Date Value Ref Range Status   06/02/2024 35.0 34.0 - 46.6 % Final   06/01/2024 35.9 34.0 - 46.6 % Final      Platlets No results found for: \"LABPLAT\"   MCV MCV   Date Value Ref Range Status   06/02/2024 97.8 (H) 79.0 - 97.0 fL Final   06/01/2024 97.8 (H) 79.0 - 97.0 fL Final          Sodium Sodium   Date Value Ref Range Status   06/02/2024 136 136 - 145 mmol/L Final   06/01/2024 139 136 - 145 mmol/L Final      Potassium Potassium   Date Value Ref Range Status   06/02/2024 4.0 3.5 - 5.2 mmol/L Final   06/01/2024 4.1 3.5 - 5.2 mmol/L Final   06/01/2024 3.5 3.5 " "- 5.2 mmol/L Final   05/31/2024 3.6 3.5 - 5.2 mmol/L Final      Chloride Chloride   Date Value Ref Range Status   06/02/2024 98 98 - 107 mmol/L Final   06/01/2024 92 (L) 98 - 107 mmol/L Final      CO2 CO2   Date Value Ref Range Status   06/02/2024 25.8 22.0 - 29.0 mmol/L Final   06/01/2024 33.3 (H) 22.0 - 29.0 mmol/L Final      BUN BUN   Date Value Ref Range Status   06/02/2024 35 (H) 8 - 23 mg/dL Final   06/01/2024 30 (H) 8 - 23 mg/dL Final      Creatinine Creatinine   Date Value Ref Range Status   06/02/2024 1.00 0.57 - 1.00 mg/dL Final   06/01/2024 1.06 (H) 0.57 - 1.00 mg/dL Final      Calcium Calcium   Date Value Ref Range Status   06/02/2024 10.3 8.6 - 10.5 mg/dL Final   06/01/2024 10.6 (H) 8.6 - 10.5 mg/dL Final      PO4 No results found for: \"CAPO4\"   Albumin No results found for: \"ALBUMIN\"   Magnesium Magnesium   Date Value Ref Range Status   06/01/2024 1.9 1.6 - 2.4 mg/dL Final        Uric Acid No results found for: \"URICACID\"        Results Review:     I reviewed the patient's new clinical results.    apixaban, 5 mg, Oral, Q12H  budesonide, 0.5 mg, Nebulization, BID - RT  cholestyramine light, 1 packet, Oral, Daily  dexAMETHasone, 4 mg, Oral, Daily With Breakfast  dilTIAZem, 90 mg, Oral, Q8H  docusate sodium, 100 mg, Oral, BID  DULoxetine, 40 mg, Oral, Daily  empagliflozin, 25 mg, Oral, Daily  ferrous sulfate, 324 mg, Oral, Daily With Breakfast  furosemide, 80 mg, Oral, BID  guaiFENesin, 1,200 mg, Oral, BID  levothyroxine, 50 mcg, Oral, Q AM  metoclopramide, 5 mg, Oral, 4x Daily AC & at Bedtime  metOLazone, 5 mg, Oral, Once per day on Monday Wednesday Friday  metoprolol succinate XL, 50 mg, Oral, Daily  polyethylene glycol, 17 g, Oral, Daily  potassium chloride, 40 mEq, Oral, Daily  predniSONE, 5 mg, Oral, Daily With Breakfast  sodium chloride, 10 mL, Intravenous, Q12H  tacrolimus, 1 mg, Oral, BID             Medication Review: Reviewed    Assessment & Plan     TONYA    ESRD - s/p DDKT     Chronic immunoRx   "   Fluid overload/CHF exacerbation     Atrial fibrillation     History of COPD     History of lung cancer     Hypokalemia    Metabolic alkalosis     Plan:  Cr improved to 0.7 with continued good UOP  K WNL  Edema much improved  Transitioned back to BID PO Lasix and MWF Metolazone  CO2 improved s/p Diamox  On fluid restriction  On Jardiance  Dispo per primary  Will follow      Naun Falcon MD  [facility]  06/03/24  07:26 EDT

## 2024-06-03 NOTE — CASE MANAGEMENT/SOCIAL WORK
Continued Stay Note  DAHIANA Gay     Patient Name: Jaja Carcamo  MRN: [id number]  Today's Date: 6/3/2024    Admit Date: 5/24/2024    Plan: [facility] accepted, precert approved 5/30-6/3. PASRR approved.   Discharge Plan       Row Name 06/03/24 1017       Plan    Plan [facility] accepted, precert approved 5/30-6/3. PASRR approved.    Patient/Family in Agreement with Plan yes    Plan Comments Discharge orders in this AM. Confirmed with facility liaison that bed is ready and pt is good to admit. CM's met with patient at bedside and provided IMM copy and discussed plans for dc today. Pt reports her son Aris will be able to transport and to give him a call. CM contacted son and confirmed he would be in. He is going to stop by patient's house and  POC for transport. Will be here within the hour. Updated primary nurse Crystal.             Megan Naegele, RN     Office Phone: [phone]  Office Cell: [phone]

## 2024-06-03 NOTE — PLAN OF CARE
Problem: Adult Inpatient Plan of Care  Goal: Plan of Care Review  Outcome: Ongoing, Progressing  Goal: Patient-Specific Goal (Individualized)  Outcome: Ongoing, Progressing  Goal: Absence of Hospital-Acquired Illness or Injury  Outcome: Ongoing, Progressing  Goal: Optimal Comfort and Wellbeing  Outcome: Ongoing, Progressing  Goal: Readiness for Transition of Care  Outcome: Ongoing, Progressing     Problem: Asthma Comorbidity  Goal: Maintenance of Asthma Control  Outcome: Ongoing, Progressing     Problem: Behavioral Health Comorbidity  Goal: Maintenance of Behavioral Health Symptom Control  Outcome: Ongoing, Progressing     Problem: COPD (Chronic Obstructive Pulmonary Disease) Comorbidity  Goal: Maintenance of COPD Symptom Control  Outcome: Ongoing, Progressing     Problem: Diabetes Comorbidity  Goal: Blood Glucose Level Within Targeted Range  Outcome: Ongoing, Progressing     Problem: Heart Failure Comorbidity  Goal: Maintenance of Heart Failure Symptom Control  Outcome: Ongoing, Progressing     Problem: Hypertension Comorbidity  Goal: Blood Pressure in Desired Range  Outcome: Ongoing, Progressing     Problem: Obstructive Sleep Apnea Risk or Actual Comorbidity Management  Goal: Unobstructed Breathing During Sleep  Outcome: Ongoing, Progressing     Problem: Osteoarthritis Comorbidity  Goal: Maintenance of Osteoarthritis Symptom Control  Outcome: Ongoing, Progressing     Problem: Pain Chronic (Persistent) (Comorbidity Management)  Goal: Acceptable Pain Control and Functional Ability  Outcome: Ongoing, Progressing     Problem: Seizure Disorder Comorbidity  Goal: Maintenance of Seizure Control  Outcome: Ongoing, Progressing     Problem: Skin Injury Risk Increased  Goal: Skin Health and Integrity  Outcome: Ongoing, Progressing     Problem: Fall Injury Risk  Goal: Absence of Fall and Fall-Related Injury  Outcome: Ongoing, Progressing  Goal: Absence of Fall and Fall-Related Injury  Outcome: Ongoing, Progressing   Goal  Outcome Evaluation:         Discussed plan of care with pt. Pt was able to rest during majority of shift. No major complaints.

## 2024-06-03 NOTE — DISCHARGE SUMMARY
Date of Discharge:  6/3/2024    Discharge Diagnosis:     SOB  Acute exacerbation of diastolic congestive heart failure  ESRD  Hypertension with ESRD  ALISE  Anemia with ESRD  Paroxysmal atrial fibrillation  Chronic diastolic congestive heart failure  Hypercoagulable state secondary to atrial fibrillation  Chronic hypoxic respiratory failure  Pulmonary hypertension  Panlobular emphysema  Chronic mucopurulent bronchitis  COPD asthma overlap syndrome  Venous hypertension  Supplemental oxygen dependency  Gastroesophageal reflux disease without esophagitis  History of kidney transplant  Acquired hypothyroidism  History of non-small cell lung cancer  History of lobectomy of lung right lower lobe  Peripheral polyneuropathy  History of peptic ulcer disease  Seasonal allergic rhinitis  Secondary hyperparathyroidism of renal origin  Secondary hyperaldosteronism  Wegener's granulomatosis with renal involvement  Valvular heart disease  OCD  Osteoarthritis  Obsessive-compulsive disorder  History of non-ST segment elevation myocardial infarction  Long-term anticoagulant therapy usage  Long-term use of immunosuppressive biologic  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          Hospital Course  Patient is a 79 y.o. female who presented with acute onset of some progressive shortness of breath with evidence of an acute exacerbation of diastolic congestive heart failure.  The patient received aggressive reticulocyte therapy with renal support and was evaluated by cardiology.  Comorbidities such as her COPD with oxygen and steroid dependency were also managed and the patient improved overall and was deemed stable for discharge to subacute care  with medications per the discharge reconciliation.  There were no other complications throughout the patient's hospital stay.  Discharge prognosis remains fair..      Procedures Performed         Consults:   Consults       Date and Time Order Name Status Description    5/24/2024  3:38 PM Inpatient Pulmonology Consult      5/24/2024  1:14 PM Inpatient Nephrology Consult      5/24/2024  1:13 PM Inpatient Cardiology Consult      5/24/2024 12:54 PM Family Medicine Consult      5/20/2024  2:37 PM Inpatient Infectious Diseases Consult Completed     5/20/2024  9:24 AM Inpatient Pulmonology Consult Completed     5/18/2024  8:57 AM Inpatient Cardiology Consult Completed             Pertinent Test Results:XR Chest 1 View    Result Date: 5/31/2024  Impression: Findings suggest CHF with mild pulmonary edema and small effusions although a component of underlying chronic lung disease is thought to be present as well. Electronically Signed: Danna Carrera MD  5/31/2024 12:49 PM EDT  Workstation ID: [id number]     Imaging Results (Last 7 Days)       Procedure Component Value Units Date/Time    XR Chest 1 View [[id number]] Collected: 05/31/24 1248     Updated: 05/31/24 1251    Narrative:      XR CHEST 1 VW    Date of Exam: 5/31/2024 12:31 PM EDT    Indication: Continued dyspnea, CHF    Comparison: 5/24/2024.    Findings:  There is stable mild to moderate cardiomegaly. There is pulmonary vascular congestion. There is a prominent interstitial pattern which appears similar. There are continued small pleural effusions. Lung fields remain hyperinflated.      Impression:      Impression:  Findings suggest CHF with mild pulmonary edema and small effusions although a component of underlying chronic lung disease is thought to be present as well.      Electronically Signed: Danna Carrera MD    5/31/2024 12:49 PM EDT    Workstation ID: [id number]                Condition on Discharge:  Fair    Vital Signs  Temp:  [98.4 °F (36.9 °C)-98.8 °F (37.1  °C)] 98.8 °F (37.1 °C)  Heart Rate:  [] 105  Resp:  [16-30] 18  BP: (126-150)/(57-77) 138/65    Physical Exam:     General Appearance:    Alert, cooperative, in no acute distress   Head:    Normocephalic, without obvious abnormality, atraumatic   Eyes:           Conjunctivae and sclerae normal, no   icterus, no pallor, corneas clear, PERRLA   Throat:   No oral lesions, no thrush, oral mucosa moist   Neck:   No adenopathy, supple, trachea midline, no thyromegaly, no   carotid bruit, no JVD   Lungs:     Clear to auscultation,respirations regular, even and                  unlabored    Heart:    Regular rhythm and normal rate, normal S1 and S2, no            murmur, no gallop, no rub, no click   Chest Wall:    No abnormalities observed   Abdomen:     Normal bowel sounds, no masses, no organomegaly, soft        non-tender, non-distended, no guarding, no rebound                tenderness   Rectal:     Deferred   Extremities:   Moves all extremities well, no edema, no cyanosis, no             redness   Pulses:   Pulses palpable and equal bilaterally   Skin:   No bleeding, bruising or rash   Lymph nodes:   No palpable adenopathy   Neurologic:   Cranial nerves 2 - 12 grossly intact, sensation intact, DTR       present and equal bilaterally         Discharge Disposition  Skilled Nursing Facility (DC - External)    Discharge Medications     Discharge Medications        New Medications        Instructions Start Date   empagliflozin 25 MG tablet tablet  Commonly known as: JARDIANCE   25 mg, Oral, Daily      metOLazone 5 MG tablet  Commonly known as: ZAROXOLYN   5 mg, Oral, 3 Times Weekly             Continue These Medications        Instructions Start Date   albuterol sulfate  (90 Base) MCG/ACT inhaler  Commonly known as: PROVENTIL HFA;VENTOLIN HFA;PROAIR HFA   2 puffs, Inhalation, Every 6 Hours PRN      Aspercreme Arthritis Pain 1 % gel gel  Generic drug: Diclofenac Sodium   4 g, Topical, 2 Times Daily PRN       colestipol 1 g tablet  Commonly known as: COLESTID   1 g, Oral, Daily      diazePAM 5 MG tablet  Commonly known as: VALIUM   5 mg, Oral, 2 Times Daily PRN      dilTIAZem 90 MG tablet  Commonly known as: CARDIZEM   90 mg, Oral, Every 8 Hours Scheduled      DRY EYE RELIEF OP   2 drops, Ophthalmic, 2 Times Daily PRN      DULoxetine HCl 40 MG capsule delayed-release particles   40 mg, Oral, Daily      Eliquis 5 MG tablet tablet  Generic drug: apixaban   Take 1 tablet by mouth Every 12 (Twelve) Hours.      ferrous sulfate 324 (65 Fe) MG tablet delayed-release EC tablet   324 mg, Oral, Daily With Breakfast      furosemide 80 MG tablet  Commonly known as: LASIX   80 mg, Oral, 2 Times Daily      KERASAL EX   1 application , Topical, 2 Times Daily PRN      levothyroxine 50 MCG tablet  Commonly known as: SYNTHROID, LEVOTHROID   50 mcg, Oral, Daily      loperamide 2 MG capsule  Commonly known as: IMODIUM   2 mg, Oral, 4 Times Daily PRN      metoprolol succinate XL 50 MG 24 hr tablet  Commonly known as: TOPROL-XL   50 mg, Oral, Daily      potassium chloride 20 MEQ CR tablet  Commonly known as: KLOR-CON M20   40 mEq, Oral, Daily      predniSONE 5 MG tablet  Commonly known as: DELTASONE   5 mg, Oral, Daily      tacrolimus 1 MG capsule  Commonly known as: PROGRAF   1 mg, Oral, 2 Times Daily      Tylenol 325 MG tablet  Generic drug: acetaminophen   650 mg, Oral, Every 4 Hours PRN             Stop These Medications      Mucinex 600 MG 12 hr tablet  Generic drug: guaiFENesin     Neosporin + Pain Relief Max St 1 % ointment  Generic drug: Risje-Enppd-Pbyzfbi-Pramoxine              Discharge Diet:   Cardiac  Activity at Discharge:   As tolerated  Follow-up Appointments  Follow-up with cardiology within 1 month from the time of discharge  Follow-up with us within 2 to 3 weeks time      Test Results Pending at Discharge       Carlos Chacko MD  06/03/24  09:04 EDT

## 2024-06-03 NOTE — PROGRESS NOTES
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  Atrial fibrillation with RV  Volume overload  Hx fall  Hypothyroidism    Hyperlipidemia  History of kidney transplant  Anemia  CAD  History of DVT     Pulmonary hypertension  HTN  UTI        PLAN:     Respiratory status is improving and she can be transferred to [facility]    Finishing dexamethasone today  Nebulized bronchodilators  Nebulized corticosteroids  Continue Mucinex    oxygen supplement and titration to maintain saturation 90-95%: Currently requiring 4 L per nasal cannula  Encouraged use I-S flutter valve  Antibiotics: completed 5/28/2024    Diuresis and electrolyte management as per nephrology  Electrolytes/ glycemic control  BP/HR control  Thyroid hormone replacement  Chronic anticoagulation: Apixaban     I personally reviewed the radiological images               LOS: 10 days     Subjective     Patient reports less shortness of breath    Objective     Vital signs for last 24 hours:  Vitals:    06/03/24 0501 06/03/24 0635 06/03/24 0638 06/03/24 0731   BP: 140/77   138/65   BP Location: Left arm   Left arm   Patient Position: Lying   Lying   Pulse: 98 95 104 105   Resp: 28 20  18   Temp: 98.4 °F (36.9 °C)   98.8 °F (37.1 °C)   TempSrc: Oral   Axillary   SpO2: 98% 99% 96% 94%   Weight: 71.3 kg (157 lb 3 oz)      Height:           Intake/Output last 3 shifts:  I/O last 3 completed shifts:  In: 720 [P.O.:720]  Out: 4500 [Urine:4500]  Intake/Output this shift:  I/O this shift:  In: 240 [P.O.:240]  Out: -       Radiology  Imaging Results (Last 24 Hours)       ** No results found for the last 24 hours. **            Labs:  Results from last 7 days   Lab Units 06/03/24  0929   WBC 10*3/mm3 13.71*   HEMOGLOBIN g/dL 10.7*   HEMATOCRIT % 35.2   PLATELETS 10*3/mm3 222     Results from last 7 days   Lab Units 06/03/24  0929   SODIUM mmol/L 135*   POTASSIUM mmol/L  3.7   CHLORIDE mmol/L 98   CO2 mmol/L 27.7   BUN mg/dL 27*   CREATININE mg/dL 0.76   CALCIUM mg/dL 10.3   GLUCOSE mg/dL 151*                           Results from last 7 days   Lab Units 06/01/24  0750   MAGNESIUM mg/dL 1.9                       Meds:   SCHEDULE  apixaban, 5 mg, Oral, Q12H  budesonide, 0.5 mg, Nebulization, BID - RT  cholestyramine light, 1 packet, Oral, Daily  dilTIAZem, 90 mg, Oral, Q8H  docusate sodium, 100 mg, Oral, BID  DULoxetine, 40 mg, Oral, Daily  empagliflozin, 25 mg, Oral, Daily  ferrous sulfate, 324 mg, Oral, Daily With Breakfast  furosemide, 80 mg, Oral, BID  guaiFENesin, 1,200 mg, Oral, BID  levothyroxine, 50 mcg, Oral, Q AM  metoclopramide, 5 mg, Oral, 4x Daily AC & at Bedtime  metOLazone, 5 mg, Oral, Once per day on Monday Wednesday Friday  metoprolol succinate XL, 50 mg, Oral, Daily  polyethylene glycol, 17 g, Oral, Daily  potassium chloride, 40 mEq, Oral, Daily  predniSONE, 5 mg, Oral, Daily With Breakfast  sodium chloride, 10 mL, Intravenous, Q12H  tacrolimus, 1 mg, Oral, BID      Infusions     PRNs    acetaminophen    Calcium Replacement - Follow Nurse / BPA Driven Protocol    carboxymethylcellulose sod    diazePAM    Diclofenac Sodium    hydrALAZINE    HYDROcodone-acetaminophen    ipratropium-albuterol    loperamide    Magnesium Standard Dose Replacement - Follow Nurse / BPA Driven Protocol    nitroglycerin    ondansetron ODT **OR** ondansetron    Phosphorus Replacement - Follow Nurse / BPA Driven Protocol    Potassium Replacement - Follow Nurse / BPA Driven Protocol    [COMPLETED] Insert Peripheral IV **AND** sodium chloride    sodium chloride    sodium chloride    Physical Exam:  General Appearance:  Alert   HEENT:  Normocephalic, without obvious abnormality, Conjunctiva/corneas clear,.   Nares normal, no drainage     Neck:  Supple, symmetrical, trachea midline.   Lungs /Chest wall:   Improved air entry, mild bilateral basal rhonchi, respirations unlabored, symmetrical wall  movement.     Heart:  Regular rate and rhythm, S1 S2 normal  Abdomen: Soft, non-tender, no masses, no organomegaly.    Extremities: Improving lower extremities edema, no clubbing or cyanosis, right upper extremity fistula in place    ROS  Constitutional: Negative for chills, fever and malaise/fatigue.   HENT: Negative.    Eyes: Negative.    Cardiovascular: Negative.    Respiratory: Positive for  shortness of breath.    Skin: Negative.    Musculoskeletal: Negative.    Gastrointestinal: Negative.    Genitourinary: Negative.    Neurological: Generalized weakness      I reviewed the recent clinical results  I personally reviewed the latest radiological studies    Part of this note may be an electronic transcription/translation of spoken language to printed text using the Dragon Dictation System.

## 2024-06-03 NOTE — PROGRESS NOTES
"Nutrition Services    Patient Name: Jaja Carcamo  YOB: 1945  MRN: [id number]  Admission date: 5/24/2024    NUTRITION SCREENING      Encounter Information: Pt evaluated due to LOS.  Is actually preparing for D/C back to ECF today; has been eating very well with 100% intake at all recent meals. Current diet remains the most appropriate nutrition intervention at this time.       PO Diet: Diet: Cardiac, Diabetic, Fluid Restriction (240 mL/tray); Healthy Heart (2-3 Na+); Consistent Carbohydrate; Other (Specify mL/day) (1200); Fluid Consistency: Thin (IDDSI 0)   PO Supplements: None ordered    PO Intake:  100% at all recent meals        Labs (reviewed below): Reviewed and C/W clinical course         GI Function:  Stool Output  Stool Unmeasured Occurrence: 1 (05/30/24 1327)  Bowel Incontinence: No (05/30/24 1327)  Stool Amount: moderate (05/30/24 1327)          Skin: No pressure injuries documented        Weight Hx Review: Estimated body mass index is 25.37 kg/m² as calculated from the following:    Height as of this encounter: 167.6 cm (66\").    Weight as of this encounter: 71.3 kg (157 lb 3 oz).         Nutrition Intervention: Will continue current diet, which is meeting needs with current intake.        Results from last 7 days   Lab Units 06/03/24  0929 06/02/24 0130 06/01/24 1743 06/01/24  0750   SODIUM mmol/L 135* 136  --  139   POTASSIUM mmol/L 3.7 4.0 4.1 3.5   CHLORIDE mmol/L 98 98  --  92*   CO2 mmol/L 27.7 25.8  --  33.3*   BUN mg/dL 27* 35*  --  30*   CREATININE mg/dL 0.76 1.00  --  1.06*   CALCIUM mg/dL 10.3 10.3  --  10.6*   GLUCOSE mg/dL 151* 154*  --  139*     Results from last 7 days   Lab Units 06/03/24  0929 06/02/24  0130 06/01/24  0750   MAGNESIUM mg/dL  --   --  1.9   HEMOGLOBIN g/dL 10.7*   < > 10.9*   HEMATOCRIT % 35.2   < > 35.9    < > = values in this interval not displayed.     COVID19   Date Value Ref Range Status   05/24/2024 Not Detected Not Detected - Ref. Range Final     No " "results found for: \"HGBA1C\"    RD to follow up per protocol.    Electronically signed by:  Vivi Delatorre RD  06/03/24 11:01 EDT    "

## 2024-06-04 NOTE — CASE MANAGEMENT/SOCIAL WORK
Case Management Discharge Note      Final Note: Xavier Bhatia      Selected Continued Care - Discharged on 6/3/2024 Admission date: 5/24/2024 - Discharge disposition: Skilled Nursing Facility (DC - External)      Destination Coordination complete.      Service Provider Selected Services Address Phone Fax Patient Preferred    [facility] [address] [facility] [place] [id number] [phone] --               Selected Continued Care - Prior Encounters Includes continued care and service providers with selected services from prior encounters from 2/24/2024 to 6/3/2024      Discharged on 5/22/2024 Admission date: 5/17/2024 - Discharge disposition: Home-Health Care Svc      Home Medical Care       Service Provider Selected Services Address Phone Fax Patient Preferred    [facility],[address], [place] [address] [phone] --                          Transportation Services  Private: Car    Final Discharge Disposition Code: 03 - skilled nursing facility (SNF)

## 2024-06-05 ENCOUNTER — HOSPITAL ENCOUNTER (INPATIENT)
Facility: HOSPITAL | Age: 79
LOS: 7 days | Discharge: NURSING, REHAB OR LONG-TERM CARE | End: 2024-06-13
Attending: EMERGENCY MEDICINE | Admitting: INTERNAL MEDICINE
Payer: MEDICARE

## 2024-06-05 ENCOUNTER — APPOINTMENT (OUTPATIENT)
Dept: GENERAL RADIOLOGY | Facility: HOSPITAL | Age: 79
End: 2024-06-05
Payer: MEDICARE

## 2024-06-05 ENCOUNTER — APPOINTMENT (OUTPATIENT)
Dept: CT IMAGING | Facility: HOSPITAL | Age: 79
End: 2024-06-05
Payer: MEDICARE

## 2024-06-05 DIAGNOSIS — N39.0 URINARY TRACT INFECTION WITHOUT HEMATURIA, SITE UNSPECIFIED: Primary | ICD-10-CM

## 2024-06-05 DIAGNOSIS — A41.9 SEPSIS, DUE TO UNSPECIFIED ORGANISM, UNSPECIFIED WHETHER ACUTE ORGAN DYSFUNCTION PRESENT: ICD-10-CM

## 2024-06-05 DIAGNOSIS — N28.9 RENAL INSUFFICIENCY: ICD-10-CM

## 2024-06-05 LAB
ADV 40+41 DNA STL QL NAA+NON-PROBE: NOT DETECTED
ALBUMIN SERPL-MCNC: 4.4 G/DL (ref 3.5–5.2)
ALBUMIN/GLOB SERPL: 1.2 G/DL
ALP SERPL-CCNC: 94 U/L (ref 39–117)
ALT SERPL W P-5'-P-CCNC: 17 U/L (ref 1–33)
ANION GAP SERPL CALCULATED.3IONS-SCNC: 18.1 MMOL/L (ref 5–15)
AST SERPL-CCNC: 31 U/L (ref 1–32)
ASTRO TYP 1-8 RNA STL QL NAA+NON-PROBE: NOT DETECTED
B PARAPERT DNA SPEC QL NAA+PROBE: NOT DETECTED
B PERT DNA SPEC QL NAA+PROBE: NOT DETECTED
BACTERIA UR QL AUTO: ABNORMAL /HPF
BASOPHILS # BLD AUTO: 0.04 10*3/MM3 (ref 0–0.2)
BASOPHILS NFR BLD AUTO: 0.2 % (ref 0–1.5)
BILIRUB SERPL-MCNC: 1.4 MG/DL (ref 0–1.2)
BILIRUB UR QL STRIP: NEGATIVE
BUN SERPL-MCNC: 50 MG/DL (ref 8–23)
BUN/CREAT SERPL: 34.5 (ref 7–25)
C CAYETANENSIS DNA STL QL NAA+NON-PROBE: NOT DETECTED
C COLI+JEJ+UPSA DNA STL QL NAA+NON-PROBE: NOT DETECTED
C PNEUM DNA NPH QL NAA+NON-PROBE: NOT DETECTED
CALCIUM SPEC-SCNC: 10.3 MG/DL (ref 8.6–10.5)
CHLORIDE SERPL-SCNC: 87 MMOL/L (ref 98–107)
CLARITY UR: ABNORMAL
CO2 SERPL-SCNC: 28.9 MMOL/L (ref 22–29)
COLOR UR: YELLOW
CREAT SERPL-MCNC: 1.45 MG/DL (ref 0.57–1)
CRYPTOSP DNA STL QL NAA+NON-PROBE: NOT DETECTED
D-LACTATE SERPL-SCNC: 2.2 MMOL/L (ref 0.5–2)
D-LACTATE SERPL-SCNC: 2.6 MMOL/L (ref 0.5–2)
D-LACTATE SERPL-SCNC: 3.2 MMOL/L (ref 0.3–2)
DEPRECATED RDW RBC AUTO: 58.2 FL (ref 37–54)
E HISTOLYT DNA STL QL NAA+NON-PROBE: NOT DETECTED
EAEC PAA PLAS AGGR+AATA ST NAA+NON-PRB: NOT DETECTED
EC STX1+STX2 GENES STL QL NAA+NON-PROBE: NOT DETECTED
EGFRCR SERPLBLD CKD-EPI 2021: 37.2 ML/MIN/1.73
EOSINOPHIL # BLD AUTO: 0.04 10*3/MM3 (ref 0–0.4)
EOSINOPHIL NFR BLD AUTO: 0.2 % (ref 0.3–6.2)
EPEC EAE GENE STL QL NAA+NON-PROBE: NOT DETECTED
ERYTHROCYTE [DISTWIDTH] IN BLOOD BY AUTOMATED COUNT: 16.9 % (ref 12.3–15.4)
ETEC LTA+ST1A+ST1B TOX ST NAA+NON-PROBE: NOT DETECTED
FLUAV SUBTYP SPEC NAA+PROBE: NOT DETECTED
FLUBV RNA ISLT QL NAA+PROBE: NOT DETECTED
G LAMBLIA DNA STL QL NAA+NON-PROBE: NOT DETECTED
GLOBULIN UR ELPH-MCNC: 3.8 GM/DL
GLUCOSE BLDC GLUCOMTR-MCNC: 267 MG/DL (ref 70–105)
GLUCOSE SERPL-MCNC: 320 MG/DL (ref 65–99)
GLUCOSE UR STRIP-MCNC: ABNORMAL MG/DL
HADV DNA SPEC NAA+PROBE: NOT DETECTED
HCOV 229E RNA SPEC QL NAA+PROBE: NOT DETECTED
HCOV HKU1 RNA SPEC QL NAA+PROBE: NOT DETECTED
HCOV NL63 RNA SPEC QL NAA+PROBE: NOT DETECTED
HCOV OC43 RNA SPEC QL NAA+PROBE: NOT DETECTED
HCT VFR BLD AUTO: 43.5 % (ref 34–46.6)
HGB BLD-MCNC: 13.4 G/DL (ref 12–15.9)
HGB UR QL STRIP.AUTO: ABNORMAL
HMPV RNA NPH QL NAA+NON-PROBE: NOT DETECTED
HOLD SPECIMEN: NORMAL
HPIV1 RNA ISLT QL NAA+PROBE: NOT DETECTED
HPIV2 RNA SPEC QL NAA+PROBE: NOT DETECTED
HPIV3 RNA NPH QL NAA+PROBE: NOT DETECTED
HPIV4 P GENE NPH QL NAA+PROBE: NOT DETECTED
HYALINE CASTS UR QL AUTO: ABNORMAL /LPF
IMM GRANULOCYTES # BLD AUTO: 0.25 10*3/MM3 (ref 0–0.05)
IMM GRANULOCYTES NFR BLD AUTO: 1.1 % (ref 0–0.5)
KETONES UR QL STRIP: ABNORMAL
LEUKOCYTE ESTERASE UR QL STRIP.AUTO: ABNORMAL
LYMPHOCYTES # BLD AUTO: 0.62 10*3/MM3 (ref 0.7–3.1)
LYMPHOCYTES NFR BLD AUTO: 2.7 % (ref 19.6–45.3)
M PNEUMO IGG SER IA-ACNC: NOT DETECTED
MCH RBC QN AUTO: 28.8 PG (ref 26.6–33)
MCHC RBC AUTO-ENTMCNC: 30.8 G/DL (ref 31.5–35.7)
MCV RBC AUTO: 93.3 FL (ref 79–97)
MONOCYTES # BLD AUTO: 1.92 10*3/MM3 (ref 0.1–0.9)
MONOCYTES NFR BLD AUTO: 8.3 % (ref 5–12)
NEUTROPHILS NFR BLD AUTO: 20.25 10*3/MM3 (ref 1.7–7)
NEUTROPHILS NFR BLD AUTO: 87.5 % (ref 42.7–76)
NITRITE UR QL STRIP: NEGATIVE
NOROVIRUS GI+II RNA STL QL NAA+NON-PROBE: NOT DETECTED
NRBC BLD AUTO-RTO: 0.1 /100 WBC (ref 0–0.2)
P SHIGELLOIDES DNA STL QL NAA+NON-PROBE: NOT DETECTED
PH UR STRIP.AUTO: 5.5 [PH] (ref 5–8)
PLATELET # BLD AUTO: 271 10*3/MM3 (ref 140–450)
PMV BLD AUTO: 10.3 FL (ref 6–12)
POTASSIUM SERPL-SCNC: 3.1 MMOL/L (ref 3.5–5.2)
PROCALCITONIN SERPL-MCNC: 2.39 NG/ML (ref 0–0.25)
PROT SERPL-MCNC: 8.2 G/DL (ref 6–8.5)
PROT UR QL STRIP: ABNORMAL
RBC # BLD AUTO: 4.66 10*6/MM3 (ref 3.77–5.28)
RBC # UR STRIP: ABNORMAL /HPF
REF LAB TEST METHOD: ABNORMAL
RHINOVIRUS RNA SPEC NAA+PROBE: NOT DETECTED
RSV RNA NPH QL NAA+NON-PROBE: NOT DETECTED
RVA RNA STL QL NAA+NON-PROBE: NOT DETECTED
S ENT+BONG DNA STL QL NAA+NON-PROBE: NOT DETECTED
SAPO I+II+IV+V RNA STL QL NAA+NON-PROBE: NOT DETECTED
SARS-COV-2 RNA NPH QL NAA+NON-PROBE: NOT DETECTED
SHIGELLA SP+EIEC IPAH ST NAA+NON-PROBE: NOT DETECTED
SODIUM SERPL-SCNC: 134 MMOL/L (ref 136–145)
SP GR UR STRIP: 1.02 (ref 1–1.03)
SQUAMOUS #/AREA URNS HPF: ABNORMAL /HPF
UROBILINOGEN UR QL STRIP: ABNORMAL
V CHOL+PARA+VUL DNA STL QL NAA+NON-PROBE: NOT DETECTED
V CHOLERAE DNA STL QL NAA+NON-PROBE: NOT DETECTED
WBC # UR STRIP: ABNORMAL /HPF
WBC NRBC COR # BLD AUTO: 23.12 10*3/MM3 (ref 3.4–10.8)
WHOLE BLOOD HOLD COAG: NORMAL
Y ENTEROCOL DNA STL QL NAA+NON-PROBE: NOT DETECTED

## 2024-06-05 PROCEDURE — 82948 REAGENT STRIP/BLOOD GLUCOSE: CPT

## 2024-06-05 PROCEDURE — 85025 COMPLETE CBC W/AUTO DIFF WBC: CPT | Performed by: EMERGENCY MEDICINE

## 2024-06-05 PROCEDURE — G0378 HOSPITAL OBSERVATION PER HR: HCPCS

## 2024-06-05 PROCEDURE — 25810000003 SODIUM CHLORIDE 0.9 % SOLUTION: Performed by: EMERGENCY MEDICINE

## 2024-06-05 PROCEDURE — 93005 ELECTROCARDIOGRAM TRACING: CPT

## 2024-06-05 PROCEDURE — 36415 COLL VENOUS BLD VENIPUNCTURE: CPT

## 2024-06-05 PROCEDURE — 87186 SC STD MICRODIL/AGAR DIL: CPT | Performed by: EMERGENCY MEDICINE

## 2024-06-05 PROCEDURE — 25010000002 ONDANSETRON PER 1 MG

## 2024-06-05 PROCEDURE — 74176 CT ABD & PELVIS W/O CONTRAST: CPT

## 2024-06-05 PROCEDURE — 84145 PROCALCITONIN (PCT): CPT | Performed by: EMERGENCY MEDICINE

## 2024-06-05 PROCEDURE — 87507 IADNA-DNA/RNA PROBE TQ 12-25: CPT | Performed by: INTERNAL MEDICINE

## 2024-06-05 PROCEDURE — 87040 BLOOD CULTURE FOR BACTERIA: CPT | Performed by: EMERGENCY MEDICINE

## 2024-06-05 PROCEDURE — 83605 ASSAY OF LACTIC ACID: CPT | Performed by: EMERGENCY MEDICINE

## 2024-06-05 PROCEDURE — 0202U NFCT DS 22 TRGT SARS-COV-2: CPT | Performed by: EMERGENCY MEDICINE

## 2024-06-05 PROCEDURE — 81001 URINALYSIS AUTO W/SCOPE: CPT | Performed by: EMERGENCY MEDICINE

## 2024-06-05 PROCEDURE — 25010000002 POTASSIUM CHLORIDE 10 MEQ/100ML SOLUTION: Performed by: INTERNAL MEDICINE

## 2024-06-05 PROCEDURE — 87088 URINE BACTERIA CULTURE: CPT | Performed by: EMERGENCY MEDICINE

## 2024-06-05 PROCEDURE — 25010000002 CEFTRIAXONE PER 250 MG: Performed by: EMERGENCY MEDICINE

## 2024-06-05 PROCEDURE — 63710000001 TACROLIMUS PER 1 MG

## 2024-06-05 PROCEDURE — 99291 CRITICAL CARE FIRST HOUR: CPT

## 2024-06-05 PROCEDURE — 93005 ELECTROCARDIOGRAM TRACING: CPT | Performed by: EMERGENCY MEDICINE

## 2024-06-05 PROCEDURE — 71045 X-RAY EXAM CHEST 1 VIEW: CPT

## 2024-06-05 PROCEDURE — 87086 URINE CULTURE/COLONY COUNT: CPT | Performed by: EMERGENCY MEDICINE

## 2024-06-05 PROCEDURE — 25010000002 ONDANSETRON PER 1 MG: Performed by: EMERGENCY MEDICINE

## 2024-06-05 PROCEDURE — 80053 COMPREHEN METABOLIC PANEL: CPT | Performed by: EMERGENCY MEDICINE

## 2024-06-05 PROCEDURE — 87154 CUL TYP ID BLD PTHGN 6+ TRGT: CPT | Performed by: EMERGENCY MEDICINE

## 2024-06-05 RX ORDER — ACETAMINOPHEN 325 MG/1
650 TABLET ORAL EVERY 4 HOURS PRN
Status: DISCONTINUED | OUTPATIENT
Start: 2024-06-05 | End: 2024-06-13 | Stop reason: HOSPADM

## 2024-06-05 RX ORDER — LOPERAMIDE HYDROCHLORIDE 2 MG/1
2 CAPSULE ORAL 4 TIMES DAILY PRN
Status: DISCONTINUED | OUTPATIENT
Start: 2024-06-05 | End: 2024-06-13 | Stop reason: HOSPADM

## 2024-06-05 RX ORDER — ONDANSETRON 2 MG/ML
4 INJECTION INTRAMUSCULAR; INTRAVENOUS ONCE
Status: COMPLETED | OUTPATIENT
Start: 2024-06-05 | End: 2024-06-05

## 2024-06-05 RX ORDER — METOLAZONE 5 MG/1
5 TABLET ORAL 3 TIMES WEEKLY
COMMUNITY

## 2024-06-05 RX ORDER — LEVOTHYROXINE SODIUM 0.05 MG/1
50 TABLET ORAL
Status: DISCONTINUED | OUTPATIENT
Start: 2024-06-06 | End: 2024-06-13 | Stop reason: HOSPADM

## 2024-06-05 RX ORDER — ONDANSETRON 2 MG/ML
4 INJECTION INTRAMUSCULAR; INTRAVENOUS EVERY 6 HOURS PRN
Status: DISCONTINUED | OUTPATIENT
Start: 2024-06-05 | End: 2024-06-13 | Stop reason: HOSPADM

## 2024-06-05 RX ORDER — SODIUM CHLORIDE 0.9 % (FLUSH) 0.9 %
10 SYRINGE (ML) INJECTION AS NEEDED
Status: DISCONTINUED | OUTPATIENT
Start: 2024-06-05 | End: 2024-06-13 | Stop reason: HOSPADM

## 2024-06-05 RX ORDER — TACROLIMUS 1 MG/1
1 CAPSULE ORAL 2 TIMES DAILY
Status: DISCONTINUED | OUTPATIENT
Start: 2024-06-05 | End: 2024-06-11

## 2024-06-05 RX ORDER — ACETAMINOPHEN 500 MG
1000 TABLET ORAL ONCE
Status: COMPLETED | OUTPATIENT
Start: 2024-06-05 | End: 2024-06-05

## 2024-06-05 RX ORDER — PREDNISONE 5 MG/1
5 TABLET ORAL DAILY
Status: DISCONTINUED | OUTPATIENT
Start: 2024-06-06 | End: 2024-06-13 | Stop reason: HOSPADM

## 2024-06-05 RX ORDER — POTASSIUM CHLORIDE 7.45 MG/ML
10 INJECTION INTRAVENOUS
Status: DISCONTINUED | OUTPATIENT
Start: 2024-06-05 | End: 2024-06-06

## 2024-06-05 RX ORDER — DIAZEPAM 5 MG/1
5 TABLET ORAL 2 TIMES DAILY PRN
Status: DISCONTINUED | OUTPATIENT
Start: 2024-06-05 | End: 2024-06-13 | Stop reason: HOSPADM

## 2024-06-05 RX ORDER — SODIUM CHLORIDE 9 MG/ML
40 INJECTION, SOLUTION INTRAVENOUS AS NEEDED
Status: DISCONTINUED | OUTPATIENT
Start: 2024-06-05 | End: 2024-06-13 | Stop reason: HOSPADM

## 2024-06-05 RX ORDER — DIAZEPAM 5 MG/1
5 TABLET ORAL 2 TIMES DAILY PRN
COMMUNITY
Start: 2024-06-04 | End: 2024-06-18

## 2024-06-05 RX ORDER — PANTOPRAZOLE SODIUM 40 MG/10ML
40 INJECTION, POWDER, LYOPHILIZED, FOR SOLUTION INTRAVENOUS EVERY MORNING
Status: DISCONTINUED | OUTPATIENT
Start: 2024-06-06 | End: 2024-06-06

## 2024-06-05 RX ORDER — FERROUS SULFATE 324(65)MG
324 TABLET, DELAYED RELEASE (ENTERIC COATED) ORAL
Status: DISCONTINUED | OUTPATIENT
Start: 2024-06-06 | End: 2024-06-13 | Stop reason: HOSPADM

## 2024-06-05 RX ORDER — SODIUM CHLORIDE 0.9 % (FLUSH) 0.9 %
10 SYRINGE (ML) INJECTION EVERY 12 HOURS SCHEDULED
Status: DISCONTINUED | OUTPATIENT
Start: 2024-06-05 | End: 2024-06-13 | Stop reason: HOSPADM

## 2024-06-05 RX ORDER — POTASSIUM CHLORIDE 29.8 MG/ML
20 INJECTION INTRAVENOUS
Status: DISCONTINUED | OUTPATIENT
Start: 2024-06-05 | End: 2024-06-05

## 2024-06-05 RX ORDER — UREA 10 %
5 LOTION (ML) TOPICAL NIGHTLY PRN
Status: DISCONTINUED | OUTPATIENT
Start: 2024-06-05 | End: 2024-06-13 | Stop reason: HOSPADM

## 2024-06-05 RX ORDER — METOPROLOL SUCCINATE 50 MG/1
50 TABLET, EXTENDED RELEASE ORAL DAILY
Status: DISCONTINUED | OUTPATIENT
Start: 2024-06-06 | End: 2024-06-13 | Stop reason: HOSPADM

## 2024-06-05 RX ADMIN — POTASSIUM CHLORIDE 10 MEQ: 7.46 INJECTION, SOLUTION INTRAVENOUS at 20:50

## 2024-06-05 RX ADMIN — Medication 10 ML: at 22:00

## 2024-06-05 RX ADMIN — CEFTRIAXONE 2000 MG: 2 INJECTION, POWDER, FOR SOLUTION INTRAMUSCULAR; INTRAVENOUS at 15:12

## 2024-06-05 RX ADMIN — TACROLIMUS 1 MG: 1 CAPSULE ORAL at 20:50

## 2024-06-05 RX ADMIN — POTASSIUM CHLORIDE 10 MEQ: 7.46 INJECTION, SOLUTION INTRAVENOUS at 22:58

## 2024-06-05 RX ADMIN — ONDANSETRON 4 MG: 2 INJECTION INTRAMUSCULAR; INTRAVENOUS at 22:05

## 2024-06-05 RX ADMIN — ACETAMINOPHEN 1000 MG: 500 TABLET, FILM COATED ORAL at 12:34

## 2024-06-05 RX ADMIN — POTASSIUM CHLORIDE 10 MEQ: 7.46 INJECTION, SOLUTION INTRAVENOUS at 21:50

## 2024-06-05 RX ADMIN — ONDANSETRON 4 MG: 2 INJECTION INTRAMUSCULAR; INTRAVENOUS at 15:15

## 2024-06-05 RX ADMIN — SODIUM CHLORIDE 1000 ML: 9 INJECTION, SOLUTION INTRAVENOUS at 12:34

## 2024-06-05 RX ADMIN — ONDANSETRON 4 MG: 2 INJECTION INTRAMUSCULAR; INTRAVENOUS at 12:34

## 2024-06-05 RX ADMIN — DILTIAZEM HYDROCHLORIDE 90 MG: 60 TABLET, FILM COATED ORAL at 22:06

## 2024-06-05 RX ADMIN — APIXABAN 5 MG: 5 TABLET, FILM COATED ORAL at 20:50

## 2024-06-05 NOTE — ED PROVIDER NOTES
Subjective   History of Present Illness  79-year-old female presents from the [facility] care facility with reports of fever weakness and vomiting today.  She denies diarrhea.  She reportedly had describes some abdominal pain earlier.  She is not describing pain at this time.  She reports some mild shortness of breath and cough.  There is no reported headache or stiff neck or any known ill contacts.  She reports no hematemesis  Review of Systems    Past Medical History:   Diagnosis Date    Allergic rhinitis 04/14/2015    Asthma 08/10/2017    Atrial flutter 05/11/2017    Chronic diarrhea 04/15/2019    Chronic hypoxemic respiratory failure 01/18/2024    Chronic pain 02/03/2015    COPD (chronic obstructive pulmonary disease)     COVID-19 virus detected 08/11/2022    Cytokine release syndrome, grade 1 08/16/2022    Edema of both lower extremities due to peripheral venous insufficiency 03/12/2021    ESRD on hemodialysis 05/22/2013    Essential (primary) hypertension 03/03/2022    Fracture of ulnar styloid 05/19/2022    Fracture of unspecified carpal bone, right wrist, subsequent encounter for fracture with routine healing 03/03/2022    Gastroesophageal reflux disease 11/12/2020    Gout 08/10/2017    Hearing loss 08/10/2017    History of appendectomy     History of DVT (deep vein thrombosis) 11/12/2020    History of kidney transplant 06/30/2017    History of lobectomy of lung 01/18/2024 03/08/2016:  RIGHT Lower Lobe Mass--> Right Video-assisted thoracoscopy with a moderate-to-large wedge resection of the RLL (by Dr. Haris Mcconnell @ [facility])--> Poorly differentiated carcinoma of the RLL.      History of repair of hip joint 05/21/2013    History of suicide attempt 05/20/2024    Hyperlipidemia 08/11/2014    Hypothyroidism, unspecified 03/03/2022    Infection due to extended spectrum beta lactamase (ESBL) producing bacteria 03/11/2016    No A2K system hx. +ESBL E coli urine on 3/11/16.      Irritable bowel syndrome  04/15/2019    Long term (current) use of immunosuppressive biologic 04/28/2021    Long term current use of anticoagulant therapy 01/18/2024    Malignant neoplasm of lung 08/10/2017    Menopausal flushing 08/30/2021    Mitral valve regurgitation 07/06/2015    Mixed anxiety and depressive disorder 04/30/2015    Non-small cell lung cancer 08/10/2017    Nonobstructive atherosclerosis of coronary artery 06/02/2019 [facility]20[facility]: LM:-nl;  LAD: diffuse, Ca++; 30%; CIRC: Dominant. Normal; RCA: small; 50% diffuse, proximal and mid-segment.      NSTEMI (non-ST elevated myocardial infarction) 08/11/2022    Obsessive-compulsive disorder 04/15/2019    Osteoarthritis 05/20/2024    Paroxysmal atrial fibrillation 05/11/2017    Paroxysmal supraventricular tachycardia 05/11/2017    Peripheral neuropathy 08/11/2014    Personal history of peptic ulcer disease 11/12/2020    Postoperative anemia due to acute blood loss 05/22/2013    Right wrist fracture 03/01/2022    Seasonal allergies 08/10/2017    Secondary hyperparathyroidism of renal origin 09/14/2015    Tricuspid valve regurgitation 03/15/2017    Ulcer of lower extremity 08/30/2021    Unspecified acute appendicitis 03/03/2022    Valvular heart disease 05/20/2024    Wegener's granulomatosis with renal involvement 03/03/2022       Allergies   Allergen Reactions    Zolpidem Other (See Comments), Unknown (See Comments) and Unknown - High Severity     KEPT HER AWAKE  KEPT HER AWAKE  KEPT HER AWAKE  KEPT HER AWAKE         Past Surgical History:   Procedure Laterality Date    APPENDECTOMY      BREAST SURGERY Left     cysts rmeoved    CARDIAC CATHETERIZATION Right 08/12/2022    Procedure: Left Heart Cath and coronary angiogram;  Surgeon: Jak Gavin MD;  Location: [facility] CATH INVASIVE LOCATION;  Service: Cardiology;  Laterality: Right;    CLOSED REDUCTION WRIST FRACTURE Right 03/01/2022    Procedure: WRIST CLOSED REDUCTION;  Surgeon: Kristian  MD Gaudencio;  Location: [facility] MAIN OR;  Service: Orthopedics;  Laterality: Right;    CYSTOSCOPY      HIP ARTHROPLASTY      HYSTERECTOMY      LUNG LOBECTOMY Right     TRANSPLANTATION RENAL         Family History   Problem Relation Age of Onset    No Known Problems Mother     No Known Problems Father        Social History     Socioeconomic History    Marital status:    Tobacco Use    Smoking status: Former    Smokeless tobacco: Never   Vaping Use    Vaping status: Former   Substance and Sexual Activity    Alcohol use: Never    Drug use: Never    Sexual activity: Defer     Prior to Admission medications    Medication Sig Start Date End Date Taking? Authorizing Provider   acetaminophen (Tylenol) 325 MG tablet Take 2 tablets by mouth Every 4 (Four) Hours As Needed for Fever or Mild Pain. 3/13/20   Court Vences MD   albuterol sulfate  (90 Base) MCG/ACT inhaler Inhale 2 puffs Every 6 (Six) Hours As Needed for Wheezing.    Court Vences MD   apixaban (ELIQUIS) 5 MG tablet tablet Take 1 tablet by mouth Every 12 (Twelve) Hours. 8/16/22   Clyde White,    Carboxymethylcellulose Sodium (DRY EYE RELIEF OP) Apply 2 drops to eye(s) as directed by provider 2 (Two) Times a Day As Needed (dry eyes). 5/11/22   Court Vences MD   colestipol (COLESTID) 1 g tablet Take 1 tablet by mouth Daily.    Court Vences MD   diazePAM (VALIUM) 5 MG tablet Take 1 tablet by mouth 2 (Two) Times a Day As Needed for Anxiety. 6/3/24   Carlos Chacko MD   Diclofenac Sodium (Aspercreme Arthritis Pain) 1 % gel gel Apply 4 g topically to the appropriate area as directed 2 (Two) Times a Day As Needed (pain). 10/21/20   Court Vences MD   dilTIAZem (CARDIZEM) 90 MG tablet Take 1 tablet by mouth Every 8 (Eight) Hours. 5/22/24   Mariangel Stearns APRN   DULoxetine HCl 40 MG capsule delayed-release particles Take 1 capsule by mouth Daily.    Court Vences MD   empagliflozin (JARDIANCE)  "25 MG tablet tablet Take 1 tablet by mouth Daily. 6/3/24   Carlos Chacko MD   ferrous sulfate 324 (65 Fe) MG tablet delayed-release EC tablet Take 1 tablet by mouth Daily With Breakfast. 5/23/24   Mariangel Stearns APRN   furosemide (LASIX) 80 MG tablet Take 1 tablet by mouth 2 (Two) Times a Day. 5/22/24   Mariangel Stearns APRN   levothyroxine (SYNTHROID, LEVOTHROID) 50 MCG tablet Take 1 tablet by mouth Daily.    Court Vences MD   loperamide (IMODIUM) 2 MG capsule Take 2 mg by mouth 4 (Four) Times a Day As Needed for Diarrhea. 3/13/20   Court Vences MD   metOLazone (ZAROXOLYN) 5 MG tablet Take 1 tablet by mouth 3 (Three) Times a Week. 6/3/24   Carlos Chacko MD   metoprolol succinate XL (TOPROL-XL) 50 MG 24 hr tablet Take 50 mg by mouth Daily.    Court Vences MD   potassium chloride (KLOR-CON M20) 20 MEQ CR tablet Take 2 tablets by mouth Daily. 5/22/24   Mariangel Stearns APRN   predniSONE (DELTASONE) 5 MG tablet Take 5 mg by mouth Daily.    Court Vences MD   Salicylic Acid-Urea (KERASAL EX) Apply 1 application topically to the appropriate area as directed 2 (Two) Times a Day As Needed (dry feet). 11/23/19   Court Vences MD   tacrolimus (PROGRAF) 1 MG capsule Take 1 mg by mouth 2 (Two) Times a Day.    Court Vences MD     /77   Pulse 112   Temp (!) 100.9 °F (38.3 °C) (Rectal)   Resp (!) 44   Ht 167.6 cm (66\")   Wt 71.3 kg (157 lb 3 oz)   SpO2 94%   BMI 25.37 kg/m²         Objective   Physical Exam  General: Well-developed elderly female somewhat ill-appearing, awake and alert  Eyes:  sclera nonicteric  HEENT: Mucous membranes dry, no mucosal swelling  Neck: Supple, no nuchal rigidity, no JVD or lymphadenopathy  Respirations: Coarse rhonchi bilaterally, respirations mildly tachypneic at rest  Heart regular rate and rhythm, no murmurs rubs or gallops,   Abdomen soft nontender nondistended, no hepatosplenomegaly, no hernia, no mass, normal " bowel sounds, no CVA tenderness  Extremities no clubbing cyanosis or edema, calves are symmetric and nontender, dialysis fistula right arm  Neuro cranial nerves grossly intact, no focal limb deficits, generally weak  Psych oriented, cooperative  Skin no rash, brisk cap refill  Procedures           ED Course      Results for orders placed or performed during the hospital encounter of 06/05/24   Respiratory Panel PCR w/COVID-19(SARS-CoV-2) HODAN/MARIA M/TWILA/PAD/COR/JENISE In-House, NP Swab in UTM/VTM, 2 HR TAT - Swab, Nasopharynx    Specimen: Nasopharynx; Swab   Result Value Ref Range    ADENOVIRUS, PCR Not Detected Not Detected    Coronavirus 229E Not Detected Not Detected    Coronavirus HKU1 Not Detected Not Detected    Coronavirus NL63 Not Detected Not Detected    Coronavirus OC43 Not Detected Not Detected    COVID19 Not Detected Not Detected - Ref. Range    Human Metapneumovirus Not Detected Not Detected    Human Rhinovirus/Enterovirus Not Detected Not Detected    Influenza A PCR Not Detected Not Detected    Influenza B PCR Not Detected Not Detected    Parainfluenza Virus 1 Not Detected Not Detected    Parainfluenza Virus 2 Not Detected Not Detected    Parainfluenza Virus 3 Not Detected Not Detected    Parainfluenza Virus 4 Not Detected Not Detected    RSV, PCR Not Detected Not Detected    Bordetella pertussis pcr Not Detected Not Detected    Bordetella parapertussis PCR Not Detected Not Detected    Chlamydophila pneumoniae PCR Not Detected Not Detected    Mycoplasma pneumo by PCR Not Detected Not Detected   Comprehensive Metabolic Panel    Specimen: Blood   Result Value Ref Range    Glucose 320 (H) 65 - 99 mg/dL    BUN 50 (H) 8 - 23 mg/dL    Creatinine 1.45 (H) 0.57 - 1.00 mg/dL    Sodium 134 (L) 136 - 145 mmol/L    Potassium 3.1 (L) 3.5 - 5.2 mmol/L    Chloride 87 (L) 98 - 107 mmol/L    CO2 28.9 22.0 - 29.0 mmol/L    Calcium 10.3 8.6 - 10.5 mg/dL    Total Protein 8.2 6.0 - 8.5 g/dL    Albumin 4.4 3.5 - 5.2 g/dL    ALT  (SGPT) 17 1 - 33 U/L    AST (SGOT) 31 1 - 32 U/L    Alkaline Phosphatase 94 39 - 117 U/L    Total Bilirubin 1.4 (H) 0.0 - 1.2 mg/dL    Globulin 3.8 gm/dL    A/G Ratio 1.2 g/dL    BUN/Creatinine Ratio 34.5 (H) 7.0 - 25.0    Anion Gap 18.1 (H) 5.0 - 15.0 mmol/L    eGFR 37.2 (L) >60.0 mL/min/1.73   Urinalysis With Culture If Indicated - Straight Cath    Specimen: Straight Cath; Urine   Result Value Ref Range    Color, UA Yellow Yellow, Straw    Appearance, UA Slightly Cloudy (A) Clear    pH, UA 5.5 5.0 - 8.0    Specific Gravity, UA 1.022 1.005 - 1.030    Glucose, UA >=1000 mg/dL (3+) (A) Negative    Ketones, UA Trace (A) Negative    Bilirubin, UA Negative Negative    Blood, UA Trace (A) Negative    Protein, UA 30 mg/dL (1+) (A) Negative    Leuk Esterase, UA Moderate (2+) (A) Negative    Nitrite, UA Negative Negative    Urobilinogen, UA 1.0 E.U./dL 0.2 - 1.0 E.U./dL   Procalcitonin    Specimen: Blood   Result Value Ref Range    Procalcitonin 2.39 (H) 0.00 - 0.25 ng/mL   CBC Auto Differential    Specimen: Blood   Result Value Ref Range    WBC 23.12 (H) 3.40 - 10.80 10*3/mm3    RBC 4.66 3.77 - 5.28 10*6/mm3    Hemoglobin 13.4 12.0 - 15.9 g/dL    Hematocrit 43.5 34.0 - 46.6 %    MCV 93.3 79.0 - 97.0 fL    MCH 28.8 26.6 - 33.0 pg    MCHC 30.8 (L) 31.5 - 35.7 g/dL    RDW 16.9 (H) 12.3 - 15.4 %    RDW-SD 58.2 (H) 37.0 - 54.0 fl    MPV 10.3 6.0 - 12.0 fL    Platelets 271 140 - 450 10*3/mm3    Neutrophil % 87.5 (H) 42.7 - 76.0 %    Lymphocyte % 2.7 (L) 19.6 - 45.3 %    Monocyte % 8.3 5.0 - 12.0 %    Eosinophil % 0.2 (L) 0.3 - 6.2 %    Basophil % 0.2 0.0 - 1.5 %    Immature Grans % 1.1 (H) 0.0 - 0.5 %    Neutrophils, Absolute 20.25 (H) 1.70 - 7.00 10*3/mm3    Lymphocytes, Absolute 0.62 (L) 0.70 - 3.10 10*3/mm3    Monocytes, Absolute 1.92 (H) 0.10 - 0.90 10*3/mm3    Eosinophils, Absolute 0.04 0.00 - 0.40 10*3/mm3    Basophils, Absolute 0.04 0.00 - 0.20 10*3/mm3    Immature Grans, Absolute 0.25 (H) 0.00 - 0.05 10*3/mm3    nRBC 0.1  0.0 - 0.2 /100 WBC   Urinalysis, Microscopic Only - Straight Cath    Specimen: Straight Cath; Urine   Result Value Ref Range    RBC, UA None Seen None Seen, 0-2 /HPF    WBC, UA Too Numerous to Count (A) None Seen, 0-2 /HPF    Bacteria, UA 3+ (A) None Seen /HPF    Squamous Epithelial Cells, UA None Seen None Seen, 0-2 /HPF    Hyaline Casts, UA None Seen None Seen /LPF    Methodology Manual Light Microscopy    STAT Lactic Acid, Reflex    Specimen: Blood   Result Value Ref Range    Lactate 2.2 (C) 0.5 - 2.0 mmol/L   POC Lactate    Specimen: Blood   Result Value Ref Range    Lactate 3.2 (C) 0.3 - 2.0 mmol/L   ECG 12 Lead Dyspnea   Result Value Ref Range    QT Interval 331 ms    QTC Interval 474 ms   Gold Top - SST   Result Value Ref Range    Extra Tube Hold for add-ons.    Light Blue Top   Result Value Ref Range    Extra Tube Hold for add-ons.      XR Chest 1 View    Result Date: 6/5/2024  Impression: 1. Improved aeration in the left lower lobe since 5/31/2024. Mild medial left basilar airspace disease remains, favored to represent atelectasis. Mild residual left basilar pneumonia not excluded 2. Stable small right pleural effusion. Left pleural effusion appears to have resolved. 3. Stable cardiomegaly. No overt features of edema. Electronically Signed: Karie Bustos MD  6/5/2024 12:46 PM EDT  Workstation ID: [id number]    CT Abdomen Pelvis Without Contrast    Result Date: 6/5/2024  Impression: 1.No acute process nor significant change identified. Electronically Signed: Mark Kiran MD  6/5/2024 12:43 PM EDT  Workstation ID: [id number]                                          Medical Decision Making  Differential diagnosis including sepsis, graft rejection, pneumonia, obstructive uropathy    Patient was found to have urinary tract infection, CT scan negative for obstructive uropathy.  She was ordered IV Rocephin as well as IV fluids for sepsis and dehydration.  She was stable on the monitor.  Heart rate was improving  somewhat she does have some underlying atrial fibrillation with some rapid ventricular spots likely more related to the sepsis and fever and dehydration.  Patient and son advised the findings she is agreeable plan of admission.  Case discussed with Noni with the [employer] service for admission.    Critical care time 40 minutes    Problems Addressed:  Renal insufficiency: complicated acute illness or injury  Sepsis, due to unspecified organism, unspecified whether acute organ dysfunction present: complicated acute illness or injury  Urinary tract infection without hematuria, site unspecified: complicated acute illness or injury    Amount and/or Complexity of Data Reviewed  Labs: ordered. Decision-making details documented in ED Course.     Details: CBC shows some leukocytosis, procalcitonin and lactate elevated, repeat lactate improved following IV fluids, urinalysis positive for infection.  Comprehensive metabolic panel showing some renal insufficiency that I suspect is due to some dehydration and overdiuresis on recent hospitalization as well as her current sepsis  Radiology: ordered and independent interpretation performed.     Details: My independent interpretation of chest x-ray image improvement from previous, no definite acute pneumonia  ECG/medicine tests: ordered and independent interpretation performed.     Details: My EKG interpretation atrial fibrillation, rate 123, left ventricular hypertrophy and review of the records this is a chronic atrial fibrillation    Risk  OTC drugs.  Prescription drug management.  Decision regarding hospitalization.        Final diagnoses:   Urinary tract infection without hematuria, site unspecified   Sepsis, due to unspecified organism, unspecified whether acute organ dysfunction present   Renal insufficiency       ED Disposition  ED Disposition       ED Disposition   Decision to Admit    Condition   --    Comment   Level of Care: Telemetry [5]   Admitting Physician: JACQUES  KAREN [[id number]]   Attending Physician: KAREN HANNA [[id number]]                 No follow-up provider specified.       Medication List        ASK your doctor about these medications      diazePAM 5 MG tablet  Commonly known as: VALIUM  Ask about: Which instructions should I use?     metOLazone 5 MG tablet  Commonly known as: ZAROXOLYN  Ask about: Which instructions should I use?                 Joey Lin MD  06/05/24 [id number]

## 2024-06-05 NOTE — H&P
Patient Care Team:  Jonathan Luna APRN as PCP - General (Family Medicine)  Jak Gavin MD as Cardiologist (Cardiology)    Chief complaint fever    Subjective     Jjaa Carcamo is a 79-year-old female who presents today from [facility]care facility with reports of fever.  Patient has recently been discharged from hospital on 6/3/2024 to [facility].  Patient was hemodynamically stable at time of discharge.  Patient reports she has had a decline in oral intake, and not felt well she has a significant past medical history of kidney transplant on antirejection medications.  She was recently hospitalized and treated for ESBL E. coli.  While in ED patient had a temperature of 103.7 with tachycardia, triggering sepsis.  Urinalysis consistent with UTI, culture pending.  Lactic 3.2 with Pro-Prabhjot 2.39.  Leukocytosis with WBC 23.12    Review of Systems   Constitutional:  Positive for activity change, fatigue and fever.   HENT: Negative.     Respiratory:  Positive for shortness of breath. Negative for chest tightness, wheezing and stridor.    Cardiovascular:  Negative for chest pain and leg swelling.   Genitourinary: Negative.    Musculoskeletal:  Positive for arthralgias and back pain.   Skin: Negative.    Neurological:  Positive for weakness.          History  Past Medical History:   Diagnosis Date    Allergic rhinitis 04/14/2015    Asthma 08/10/2017    Atrial flutter 05/11/2017    Chronic diarrhea 04/15/2019    Chronic hypoxemic respiratory failure 01/18/2024    Chronic pain 02/03/2015    COPD (chronic obstructive pulmonary disease)     COVID-19 virus detected 08/11/2022    Cytokine release syndrome, grade 1 08/16/2022    Edema of both lower extremities due to peripheral venous insufficiency 03/12/2021    ESRD on hemodialysis 05/22/2013    Essential (primary) hypertension 03/03/2022    Fracture of ulnar styloid 05/19/2022    Fracture of unspecified carpal bone, right wrist, subsequent encounter for fracture with  routine healing 03/03/2022    Gastroesophageal reflux disease 11/12/2020    Gout 08/10/2017    Hearing loss 08/10/2017    History of appendectomy     History of DVT (deep vein thrombosis) 11/12/2020    History of kidney transplant 06/30/2017    History of lobectomy of lung 01/18/2024 03/08/2016:  RIGHT Lower Lobe Mass--> Right Video-assisted thoracoscopy with a moderate-to-large wedge resection of the RLL (by Dr. Haris Mcconnell @ [facility])--> Poorly differentiated carcinoma of the RLL.      History of repair of hip joint 05/21/2013    History of suicide attempt 05/20/2024    Hyperlipidemia 08/11/2014    Hypothyroidism, unspecified 03/03/2022    Infection due to extended spectrum beta lactamase (ESBL) producing bacteria 03/11/2016    No A2K system hx. +ESBL E coli urine on 3/11/16.      Irritable bowel syndrome 04/15/2019    Long term (current) use of immunosuppressive biologic 04/28/2021    Long term current use of anticoagulant therapy 01/18/2024    Malignant neoplasm of lung 08/10/2017    Menopausal flushing 08/30/2021    Mitral valve regurgitation 07/06/2015    Mixed anxiety and depressive disorder 04/30/2015    Non-small cell lung cancer 08/10/2017    Nonobstructive atherosclerosis of coronary artery 06/02/2019 08/12/2022: CATH: Prashant: NSTEMI assoc with Covid-19: LM:-nl;  LAD: diffuse, Ca++; 30%; CIRC: Dominant. Normal; RCA: small; 50% diffuse, proximal and mid-segment.      NSTEMI (non-ST elevated myocardial infarction) 08/11/2022    Obsessive-compulsive disorder 04/15/2019    Osteoarthritis 05/20/2024    Paroxysmal atrial fibrillation 05/11/2017    Paroxysmal supraventricular tachycardia 05/11/2017    Peripheral neuropathy 08/11/2014    Personal history of peptic ulcer disease 11/12/2020    Postoperative anemia due to acute blood loss 05/22/2013    Right wrist fracture 03/01/2022    Seasonal allergies 08/10/2017    Secondary hyperparathyroidism of renal origin 09/14/2015    Tricuspid valve  regurgitation 03/15/2017    Ulcer of lower extremity 08/30/2021    Unspecified acute appendicitis 03/03/2022    Valvular heart disease 05/20/2024    Wegener's granulomatosis with renal involvement 03/03/2022     Past Surgical History:   Procedure Laterality Date    APPENDECTOMY      BREAST SURGERY Left     cysts rmeoved    CARDIAC CATHETERIZATION Right 08/12/2022    Procedure: Left Heart Cath and coronary angiogram;  Surgeon: Jak Gavin MD;  Location: [facility] CATH INVASIVE LOCATION;  Service: Cardiology;  Laterality: Right;    CLOSED REDUCTION WRIST FRACTURE Right 03/01/2022    Procedure: WRIST CLOSED REDUCTION;  Surgeon: Gaudencio Townsend MD;  Location: [facility] MAIN OR;  Service: Orthopedics;  Laterality: Right;    CYSTOSCOPY      HIP ARTHROPLASTY      HYSTERECTOMY      LUNG LOBECTOMY Right     TRANSPLANTATION RENAL       Family History   Problem Relation Age of Onset    No Known Problems Mother     No Known Problems Father      Social History     Tobacco Use    Smoking status: Former    Smokeless tobacco: Never   Vaping Use    Vaping status: Former   Substance Use Topics    Alcohol use: Never    Drug use: Never     (Not in a hospital admission)    Allergies:  Zolpidem    Objective     Vital Signs  Temp:  [99.8 °F (37.7 °C)-103.7 °F (39.8 °C)] 100.9 °F (38.3 °C)  Heart Rate:  [] 125  Resp:  [20-44] 44  BP: (128-166)/(61-90) 137/72       Physical Exam  Vitals reviewed.   HENT:      Head: Normocephalic.      Right Ear: External ear normal.      Nose: Nose normal.      Mouth/Throat:      Mouth: Mucous membranes are dry.   Eyes:      General:         Right eye: No discharge.         Left eye: No discharge.   Cardiovascular:      Rate and Rhythm: Tachycardia present.      Pulses: Normal pulses.      Heart sounds: Murmur heard.   Pulmonary:      Effort: Pulmonary effort is normal.      Breath sounds: Rhonchi present.   Abdominal:      General: Bowel sounds are normal.      Palpations: Abdomen is soft.   Musculoskeletal:          General: No swelling. Normal range of motion.      Right lower leg: No edema.      Left lower leg: No edema.   Skin:     General: Skin is warm and dry.   Neurological:      Mental Status: She is alert and oriented to person, place, and time.   Psychiatric:         Mood and Affect: Mood normal.         Behavior: Behavior normal.          Results Review:     Imaging Results (Last 24 Hours)       Procedure Component Value Units Date/Time    XR Chest 1 View [[id number]] Collected: 06/05/24 1244     Updated: 06/05/24 1248    Narrative:      XR CHEST 1 VW    Date of Exam: 6/5/2024 12:43 PM EDT    Indication: cough, soa    Comparison: AP portable chest 5/31/2024. CT chest 5/19/2024    Findings:  Advanced emphysematous changes are seen to better advantage on prior CT chest imaging. There is mild posterior medial left basilar airspace disease favored to represent atelectasis. Left lower lobe aeration appears improved since 5/31/2024. Trace right   basilar pleural effusion is unchanged. No significant left pleural effusion is identified. Benign calcified nodule in the left upper lobe. Heart size is enlarged but stable. No acute osseous abnormality      Impression:      Impression:    1. Improved aeration in the left lower lobe since 5/31/2024. Mild medial left basilar airspace disease remains, favored to represent atelectasis. Mild residual left basilar pneumonia not excluded  2. Stable small right pleural effusion. Left pleural effusion appears to have resolved.  3. Stable cardiomegaly. No overt features of edema.      Electronically Signed: Karie Bustos MD    6/5/2024 12:46 PM EDT    Workstation ID: [id number]    CT Abdomen Pelvis Without Contrast [[id number]] Collected: 06/05/24 1238     Updated: 06/05/24 1245    Narrative:      CT ABDOMEN PELVIS WO CONTRAST    Date of Exam: 6/5/2024 12:31 PM EDT    Indication: vomiting, fever.    Comparison: 5/21/2024    Technique: Axial CT images were obtained of the abdomen and  pelvis without the administration of contrast. Sagittal and coronal reconstructions were performed.  Automated exposure control and iterative reconstruction methods were used.      Findings:  LUNG BASES: Small left pleural effusion with left basal atelectasis, unchanged. Lower lung interstitial coarsening is similar.    LIVER:  Unremarkable parenchyma without focal lesion.  BILIARY/GALLBLADDER: There are calcified gallstones. There are no gallbladder inflammatory changes.  SPLEEN:  Unremarkable  PANCREAS:  Unremarkable  ADRENAL: Stable left adrenal nodule dating back to 2011.  KIDNEYS: Advanced bilateral native renal atrophy with no solid mass identified. There is a right lower quadrant transplant kidney. No obstruction.  There are bilateral native renal calcifications likely combination of vascular and collecting system   calculi.  GASTROINTESTINAL/MESENTERY:  No evidence of obstruction nor inflammation.    AORTA/IVC:  Normal caliber.    RETROPERITONEUM/LYMPH NODES:  Unremarkable    REPRODUCTIVE:  Unremarkable  BLADDER:  Unremarkable    OSSEUS STRUCTURES: There are bilateral total hip arthroplasties with associated streak and beam hardening artifact.      Impression:      Impression:  1.No acute process nor significant change identified.            Electronically Signed: Mark Kiran MD    6/5/2024 12:43 PM EDT    Workstation ID: [id number]             Lab Results (last 24 hours)       Procedure Component Value Units Date/Time    STAT Lactic Acid, Reflex [[id number]] Collected: 06/05/24 1603    Specimen: Blood Updated: 06/05/24 1605    Respiratory Panel PCR w/COVID-19(SARS-CoV-2) HODAN/MARIA M/TWILA/PAD/COR/JENISE In-House, NP Swab in UTM/VTM, 2 HR TAT - Swab, Nasopharynx [[id number]]  (Normal) Collected: 06/05/24 1224    Specimen: Swab from Nasopharynx Updated: 06/05/24 1326     ADENOVIRUS, PCR Not Detected     Coronavirus 229E Not Detected     Coronavirus HKU1 Not Detected     Coronavirus NL63 Not Detected     Coronavirus OC43  Not Detected     COVID19 Not Detected     Human Metapneumovirus Not Detected     Human Rhinovirus/Enterovirus Not Detected     Influenza A PCR Not Detected     Influenza B PCR Not Detected     Parainfluenza Virus 1 Not Detected     Parainfluenza Virus 2 Not Detected     Parainfluenza Virus 3 Not Detected     Parainfluenza Virus 4 Not Detected     RSV, PCR Not Detected     Bordetella pertussis pcr Not Detected     Bordetella parapertussis PCR Not Detected     Chlamydophila pneumoniae PCR Not Detected     Mycoplasma pneumo by PCR Not Detected    Narrative:      In the setting of a positive respiratory panel with a viral infection PLUS a negative procalcitonin without other underlying concern for bacterial infection, consider observing off antibiotics or discontinuation of antibiotics and continue supportive care. If the respiratory panel is positive for atypical bacterial infection (Bordetella pertussis, Chlamydophila pneumoniae, or Mycoplasma pneumoniae), consider antibiotic de-escalation to target atypical bacterial infection.    Comprehensive Metabolic Panel [[id number]]  (Abnormal) Collected: 06/05/24 1223    Specimen: Blood Updated: 06/05/24 1308     Glucose 320 mg/dL      BUN 50 mg/dL      Comment: Result checked          Creatinine 1.45 mg/dL      Comment: Result checked          Sodium 134 mmol/L      Potassium 3.1 mmol/L      Chloride 87 mmol/L      CO2 28.9 mmol/L      Calcium 10.3 mg/dL      Total Protein 8.2 g/dL      Albumin 4.4 g/dL      ALT (SGPT) 17 U/L      AST (SGOT) 31 U/L      Alkaline Phosphatase 94 U/L      Total Bilirubin 1.4 mg/dL      Globulin 3.8 gm/dL      A/G Ratio 1.2 g/dL      BUN/Creatinine Ratio 34.5     Anion Gap 18.1 mmol/L      eGFR 37.2 mL/min/1.73     Narrative:      GFR Normal >60  Chronic Kidney Disease <60  Kidney Failure <15    The GFR formula is only valid for adults with stable renal function between ages 18 and 70.    Procalcitonin [[id number]]  (Abnormal) Collected: 06/05/24  "1223    Specimen: Blood Updated: 06/05/24 1305     Procalcitonin 2.39 ng/mL     Narrative:      As a Marker for Sepsis (Non-Neonates):    1. <0.5 ng/mL represents a low risk of severe sepsis and/or septic shock.  2. >2 ng/mL represents a high risk of severe sepsis and/or septic shock.    As a Marker for Lower Respiratory Tract Infections that require antibiotic therapy:    PCT on Admission    Antibiotic Therapy       6-12 Hrs later    >0.5                Strongly Recommended  >0.25 - <0.5        Recommended   0.1 - 0.25          Discouraged              Remeasure/reassess PCT  <0.1                Strongly Discouraged     Remeasure/reassess PCT    As 28 day mortality risk marker: \"Change in Procalcitonin Result\" (>80% or <=80%) if Day 0 (or Day 1) and Day 4 values are available. Refer to http://www.[facility]-pct-calculator.com    Change in PCT <=80%  A decrease of PCT levels below or equal to 80% defines a positive change in PCT test result representing a higher risk for 28-day all-cause mortality of patients diagnosed with severe sepsis for septic shock.    Change in PCT >80%  A decrease of PCT levels of more than 80% defines a negative change in PCT result representing a lower risk for 28-day all-cause mortality of patients diagnosed with severe sepsis or septic shock.       Urinalysis, Microscopic Only - Straight Cath [[id number]]  (Abnormal) Collected: 06/05/24 1224    Specimen: Urine from Straight Cath Updated: 06/05/24 1302     RBC, UA None Seen /HPF      WBC, UA Too Numerous to Count /HPF      Bacteria, UA 3+ /HPF      Squamous Epithelial Cells, UA None Seen /HPF      Hyaline Casts, UA None Seen /LPF      Methodology Manual Light Microscopy    Urine Culture - Urine, Straight Cath [[id number]] Collected: 06/05/24 1224    Specimen: Urine from Straight Cath Updated: 06/05/24 1302    Urinalysis With Culture If Indicated - Straight Cath [[id number]]  (Abnormal) Collected: 06/05/24 1224    Specimen: Urine from Straight " Cath Updated: 06/05/24 1247     Color, UA Yellow     Appearance, UA Slightly Cloudy     pH, UA 5.5     Specific Gravity, UA 1.022     Glucose, UA >=1000 mg/dL (3+)     Ketones, UA Trace     Bilirubin, UA Negative     Blood, UA Trace     Protein, UA 30 mg/dL (1+)     Leuk Esterase, UA Moderate (2+)     Nitrite, UA Negative     Urobilinogen, UA 1.0 E.U./dL    Narrative:      In absence of clinical symptoms, the presence of pyuria, bacteria, and/or nitrites on the urinalysis result does not correlate with infection.    POC Lactate [[id number]]  (Abnormal) Collected: 06/05/24 1244    Specimen: Blood Updated: 06/05/24 1247     Lactate 3.2 mmol/L      Comment: Serial Number: [id number]:  [id number]       Extra Tubes [[id number]] Collected: 06/05/24 1223    Specimen: Blood, Venous Line Updated: 06/05/24 1245    Narrative:      The following orders were created for panel order Extra Tubes.  Procedure                               Abnormality         Status                     ---------                               -----------         ------                     Gold Top - SST[[id number]]                                   Final result               Light Blue Top[[id number]]                                   Final result                 Please view results for these tests on the individual orders.    Gold Top - SST [[id number]] Collected: 06/05/24 1223    Specimen: Blood Updated: 06/05/24 1245     Extra Tube Hold for add-ons.     Comment: Auto resulted.       Light Blue Top [[id number]] Collected: 06/05/24 1223    Specimen: Blood Updated: 06/05/24 1245     Extra Tube Hold for add-ons.     Comment: Auto resulted       CBC & Differential [[id number]]  (Abnormal) Collected: 06/05/24 1223    Specimen: Blood Updated: 06/05/24 1240    Narrative:      The following orders were created for panel order CBC & Differential.  Procedure                               Abnormality         Status                     ---------                                -----------         ------                     CBC Auto Differential[[id number]]        Abnormal            Final result                 Please view results for these tests on the individual orders.    CBC Auto Differential [[id number]]  (Abnormal) Collected: 06/05/24 1223    Specimen: Blood Updated: 06/05/24 1240     WBC 23.12 10*3/mm3      RBC 4.66 10*6/mm3      Hemoglobin 13.4 g/dL      Hematocrit 43.5 %      MCV 93.3 fL      MCH 28.8 pg      MCHC 30.8 g/dL      RDW 16.9 %      RDW-SD 58.2 fl      MPV 10.3 fL      Platelets 271 10*3/mm3      Neutrophil % 87.5 %      Lymphocyte % 2.7 %      Monocyte % 8.3 %      Eosinophil % 0.2 %      Basophil % 0.2 %      Immature Grans % 1.1 %      Neutrophils, Absolute 20.25 10*3/mm3      Lymphocytes, Absolute 0.62 10*3/mm3      Monocytes, Absolute 1.92 10*3/mm3      Eosinophils, Absolute 0.04 10*3/mm3      Basophils, Absolute 0.04 10*3/mm3      Immature Grans, Absolute 0.25 10*3/mm3      nRBC 0.1 /100 WBC     Blood Culture - Blood, Arm, Left [[id number]] Collected: 06/05/24 1223    Specimen: Blood from Arm, Left Updated: 06/05/24 1233    Blood Culture - Blood, Arm, Left [[id number]] Collected: 06/05/24 1223    Specimen: Blood from Arm, Left Updated: 06/05/24 1233             I reviewed the patient's new clinical results.    Assessment & Plan     Urinary tract infection  Fever, 103.7  Sepsis  Leukocytosis  -Lactic acid 3.2 with Pro-Prabhjot 2.39  -Will start Rocephin and follow culture  -Gentle hydration    Acute on chronic kidney disease  Elevated creatinine  History of renal transplant on antirejection medication prednisone and Prograf  -Gentle hydration  -Appreciate nephrology's input    Hypokalemia, replace    Congestive heart failure  -Gentle hydration  -Diuretics on hold  -Continue Jardiance    Atrial fibrillation  Macrocytic anemia  Chronic coronary artery disease  Pulmonary hypertension    Diet: Healthy heart, renal  DVT prophylaxis: SCDs  GI prophylaxis:  Protonix  Code status: Full code      I discussed the patient's findings and my recommendations with patient.     TONYA Mathis  06/05/24  16:11 EDT

## 2024-06-05 NOTE — Clinical Note
Level of Care: Med/Surg [1]   Diagnosis: UTI (urinary tract infection) [811378]   Admitting Physician: KAREN HANNA [[id number]]   Attending Physician: KAREN HANNA [[id number]]

## 2024-06-05 NOTE — LETTER
EMS Transport Request  For use at [facility], [place], [place], [place], and [place] only   Patient Name: Jaja Carcamo : 1945   Weight:74.4 kg (164 lb 0.4 oz) Pick-up Location: 2206 BLS/ALS: N/A   Insurance: [employer] MEDICARE REPLACEMENT Auth End Date: N/A   Pre-Cert #: D/C Summary complete:    Destination: [address]    Contact Precautions: Other Contact Spore, Contact    Equipment (O2, Fluids, etc.): O2, settings 4L O2 NC   Arrive By Date/Time: Anytime  Stretcher/WC: Wheelchair   CM Requesting: Yoana Judge RN Ext: [phone]   Notes/Medical Necessity: Patient requires O2.      ______________________________________________________________________    *Only 2 patient bags OR 1 carry-on size bag are permitted.  Wheelchairs and walkers CANNOT transported with the patient. Acknowledge: Yes

## 2024-06-06 LAB
ANION GAP SERPL CALCULATED.3IONS-SCNC: 16.4 MMOL/L (ref 5–15)
BASOPHILS # BLD AUTO: 0.03 10*3/MM3 (ref 0–0.2)
BASOPHILS NFR BLD AUTO: 0.2 % (ref 0–1.5)
BUN SERPL-MCNC: 47 MG/DL (ref 8–23)
BUN/CREAT SERPL: 40.5 (ref 7–25)
CALCIUM SPEC-SCNC: 10.2 MG/DL (ref 8.6–10.5)
CHLORIDE SERPL-SCNC: 93 MMOL/L (ref 98–107)
CO2 SERPL-SCNC: 29.6 MMOL/L (ref 22–29)
CREAT SERPL-MCNC: 1.16 MG/DL (ref 0.57–1)
D-LACTATE SERPL-SCNC: 2.7 MMOL/L (ref 0.5–2)
D-LACTATE SERPL-SCNC: 3.2 MMOL/L (ref 0.5–2)
DEPRECATED RDW RBC AUTO: 57.6 FL (ref 37–54)
EGFRCR SERPLBLD CKD-EPI 2021: 48.7 ML/MIN/1.73
EOSINOPHIL # BLD AUTO: 0 10*3/MM3 (ref 0–0.4)
EOSINOPHIL NFR BLD AUTO: 0 % (ref 0.3–6.2)
ERYTHROCYTE [DISTWIDTH] IN BLOOD BY AUTOMATED COUNT: 16.7 % (ref 12.3–15.4)
GLUCOSE SERPL-MCNC: 201 MG/DL (ref 65–99)
HCT VFR BLD AUTO: 43.6 % (ref 34–46.6)
HGB BLD-MCNC: 13.5 G/DL (ref 12–15.9)
IMM GRANULOCYTES # BLD AUTO: 0.14 10*3/MM3 (ref 0–0.05)
IMM GRANULOCYTES NFR BLD AUTO: 0.7 % (ref 0–0.5)
LYMPHOCYTES # BLD AUTO: 0.71 10*3/MM3 (ref 0.7–3.1)
LYMPHOCYTES NFR BLD AUTO: 3.6 % (ref 19.6–45.3)
MCH RBC QN AUTO: 29 PG (ref 26.6–33)
MCHC RBC AUTO-ENTMCNC: 31 G/DL (ref 31.5–35.7)
MCV RBC AUTO: 93.6 FL (ref 79–97)
MONOCYTES # BLD AUTO: 1.88 10*3/MM3 (ref 0.1–0.9)
MONOCYTES NFR BLD AUTO: 9.5 % (ref 5–12)
NEUTROPHILS NFR BLD AUTO: 17.08 10*3/MM3 (ref 1.7–7)
NEUTROPHILS NFR BLD AUTO: 86 % (ref 42.7–76)
NRBC BLD AUTO-RTO: 0.1 /100 WBC (ref 0–0.2)
PLATELET # BLD AUTO: 229 10*3/MM3 (ref 140–450)
PMV BLD AUTO: 10.5 FL (ref 6–12)
POTASSIUM SERPL-SCNC: 3.2 MMOL/L (ref 3.5–5.2)
QT INTERVAL: 331 MS
QTC INTERVAL: 474 MS
RBC # BLD AUTO: 4.66 10*6/MM3 (ref 3.77–5.28)
SODIUM SERPL-SCNC: 139 MMOL/L (ref 136–145)
WBC NRBC COR # BLD AUTO: 19.84 10*3/MM3 (ref 3.4–10.8)

## 2024-06-06 PROCEDURE — 83605 ASSAY OF LACTIC ACID: CPT | Performed by: EMERGENCY MEDICINE

## 2024-06-06 PROCEDURE — 85025 COMPLETE CBC W/AUTO DIFF WBC: CPT

## 2024-06-06 PROCEDURE — 25010000002 ONDANSETRON PER 1 MG

## 2024-06-06 PROCEDURE — 63710000001 PREDNISONE PER 5 MG

## 2024-06-06 PROCEDURE — 25810000003 SODIUM CHLORIDE 0.9 % SOLUTION: Performed by: INTERNAL MEDICINE

## 2024-06-06 PROCEDURE — 63710000001 TACROLIMUS PER 1 MG

## 2024-06-06 PROCEDURE — 25010000002 CEFTRIAXONE PER 250 MG

## 2024-06-06 PROCEDURE — 80048 BASIC METABOLIC PNL TOTAL CA: CPT

## 2024-06-06 PROCEDURE — 87481 CANDIDA DNA AMP PROBE: CPT | Performed by: INTERNAL MEDICINE

## 2024-06-06 RX ORDER — SODIUM CHLORIDE 9 MG/ML
100 INJECTION, SOLUTION INTRAVENOUS CONTINUOUS
Status: DISPENSED | OUTPATIENT
Start: 2024-06-06 | End: 2024-06-06

## 2024-06-06 RX ORDER — METOPROLOL TARTRATE 1 MG/ML
5 INJECTION, SOLUTION INTRAVENOUS ONCE
Status: COMPLETED | OUTPATIENT
Start: 2024-06-06 | End: 2024-06-06

## 2024-06-06 RX ORDER — DILTIAZEM HCL/D5W 125 MG/125
5-15 PLASTIC BAG, INJECTION (ML) INTRAVENOUS
Status: DISCONTINUED | OUTPATIENT
Start: 2024-06-06 | End: 2024-06-09

## 2024-06-06 RX ORDER — DILTIAZEM HCL/D5W 125 MG/125
PLASTIC BAG, INJECTION (ML) INTRAVENOUS
Status: COMPLETED
Start: 2024-06-06 | End: 2024-06-06

## 2024-06-06 RX ORDER — FAMOTIDINE 10 MG/ML
20 INJECTION, SOLUTION INTRAVENOUS DAILY
Status: DISCONTINUED | OUTPATIENT
Start: 2024-06-07 | End: 2024-06-08

## 2024-06-06 RX ADMIN — APIXABAN 5 MG: 5 TABLET, FILM COATED ORAL at 08:05

## 2024-06-06 RX ADMIN — Medication 10 MG/HR: at 06:22

## 2024-06-06 RX ADMIN — CEFTRIAXONE 2000 MG: 2 INJECTION, POWDER, FOR SOLUTION INTRAMUSCULAR; INTRAVENOUS at 14:00

## 2024-06-06 RX ADMIN — POTASSIUM CHLORIDE 30 MEQ: 750 TABLET, EXTENDED RELEASE ORAL at 08:04

## 2024-06-06 RX ADMIN — APIXABAN 5 MG: 5 TABLET, FILM COATED ORAL at 21:10

## 2024-06-06 RX ADMIN — Medication 10 ML: at 21:11

## 2024-06-06 RX ADMIN — LEVOTHYROXINE SODIUM 50 MCG: 0.05 TABLET ORAL at 08:05

## 2024-06-06 RX ADMIN — Medication 10 ML: at 08:20

## 2024-06-06 RX ADMIN — PREDNISONE 5 MG: 5 TABLET ORAL at 08:05

## 2024-06-06 RX ADMIN — FERROUS SULFATE TAB EC 324 MG (65 MG FE EQUIVALENT) 324 MG: 324 (65 FE) TABLET DELAYED RESPONSE at 08:05

## 2024-06-06 RX ADMIN — DIAZEPAM 5 MG: 5 TABLET ORAL at 02:38

## 2024-06-06 RX ADMIN — ACETAMINOPHEN 650 MG: 325 TABLET, FILM COATED ORAL at 02:38

## 2024-06-06 RX ADMIN — METOPROLOL TARTRATE 5 MG: 1 INJECTION, SOLUTION INTRAVENOUS at 01:40

## 2024-06-06 RX ADMIN — Medication 10 MG/HR: at 17:23

## 2024-06-06 RX ADMIN — ONDANSETRON 4 MG: 2 INJECTION INTRAMUSCULAR; INTRAVENOUS at 13:44

## 2024-06-06 RX ADMIN — PANTOPRAZOLE SODIUM 40 MG: 40 INJECTION, POWDER, FOR SOLUTION INTRAVENOUS at 08:04

## 2024-06-06 RX ADMIN — SODIUM CHLORIDE 100 ML/HR: 9 INJECTION, SOLUTION INTRAVENOUS at 08:06

## 2024-06-06 RX ADMIN — ONDANSETRON 4 MG: 2 INJECTION INTRAMUSCULAR; INTRAVENOUS at 06:21

## 2024-06-06 RX ADMIN — TACROLIMUS 1 MG: 1 CAPSULE ORAL at 21:10

## 2024-06-06 RX ADMIN — METOPROLOL SUCCINATE 50 MG: 50 TABLET, EXTENDED RELEASE ORAL at 08:05

## 2024-06-06 RX ADMIN — TACROLIMUS 1 MG: 1 CAPSULE ORAL at 08:04

## 2024-06-06 NOTE — DISCHARGE PLACEMENT REQUEST
"Gene Carcamo (79 y.o. Female)       Date of Birth   1945    Social Security Number       Address   [address] [place] [address]    Home Phone   [phone]    MRN   [id number]       Taoism   None    Marital Status                               Admission Date   6/5/24    Admission Type   Emergency    Admitting Provider   Juanis Lowe MD    Attending Provider   Juanis Lowe MD    Department, Room/Bed   [facility] CARDIOVASCULAR CARE UNIT, 2206/1       Discharge Date       Discharge Disposition       Discharge Destination                                 Attending Provider: Juanis Lowe MD    Allergies: Zolpidem    Isolation: Contact   Infection: Candida Auris (rule out) (06/05/24)   Code Status: CPR    Ht: 167.6 cm (66\")   Wt: 64.4 kg (141 lb 15.6 oz)    Admission Cmt: None   Principal Problem: UTI (urinary tract infection) [N39.0]                   Active Insurance as of 6/5/2024       Primary Coverage       Payor Plan Insurance Group Employer/Plan Group    [facility] MEDICARE REPLACEMENT [facility] GROUP [id number]       Payor Plan Address Payor Plan Phone Number Payor Plan Fax Number Effective Dates    [address] [address]   1/1/2024 - None Entered    [facility] [id number]         Subscriber Name Subscriber Birth Date Member ID       GENE CARCAMO 1945 [id number]                     Emergency Contacts        (Rel.) Home Phone Work Phone Mobile Phone    RENNY CARCAMO (Son) [phone] -- [phone]    abbeygoldy pickett (Grandchild) [phone] -- [phone]    Damaris No (Sister) -- -- [phone]                 History & Physical        DarynMariangel APRN at 06/05/24 1611       Attestation signed by Juanis Lowe MD at 06/05/24 1800    I have reviewed this documentation and agree.                      Patient Care Team:  Jonathan Luna APRN as PCP - General (Family Medicine)  Jak Gavin MD as Cardiologist " (Cardiology)    Chief complaint fever    Subjective    Jaja Carcamo is a 79-year-old female who presents today from [facility]care facility with reports of fever.  Patient has recently been discharged from hospital on 6/3/2024 to [facility].  Patient was hemodynamically stable at time of discharge.  Patient reports she has had a decline in oral intake, and not felt well she has a significant past medical history of kidney transplant on antirejection medications.  She was recently hospitalized and treated for ESBL E. coli.  While in ED patient had a temperature of 103.7 with tachycardia, triggering sepsis.  Urinalysis consistent with UTI, culture pending.  Lactic 3.2 with Pro-Prabhjot 2.39.  Leukocytosis with WBC 23.12    Review of Systems   Constitutional:  Positive for activity change, fatigue and fever.   HENT: Negative.     Respiratory:  Positive for shortness of breath. Negative for chest tightness, wheezing and stridor.    Cardiovascular:  Negative for chest pain and leg swelling.   Genitourinary: Negative.    Musculoskeletal:  Positive for arthralgias and back pain.   Skin: Negative.    Neurological:  Positive for weakness.          History  Past Medical History:   Diagnosis Date    Allergic rhinitis 04/14/2015    Asthma 08/10/2017    Atrial flutter 05/11/2017    Chronic diarrhea 04/15/2019    Chronic hypoxemic respiratory failure 01/18/2024    Chronic pain 02/03/2015    COPD (chronic obstructive pulmonary disease)     COVID-19 virus detected 08/11/2022    Cytokine release syndrome, grade 1 08/16/2022    Edema of both lower extremities due to peripheral venous insufficiency 03/12/2021    ESRD on hemodialysis 05/22/2013    Essential (primary) hypertension 03/03/2022    Fracture of ulnar styloid 05/19/2022    Fracture of unspecified carpal bone, right wrist, subsequent encounter for fracture with routine healing 03/03/2022    Gastroesophageal reflux disease 11/12/2020    Gout 08/10/2017    Hearing loss 08/10/2017     History of appendectomy     History of DVT (deep vein thrombosis) 11/12/2020    History of kidney transplant 06/30/2017    History of lobectomy of lung 01/18/2024 03/08/2016:  RIGHT Lower Lobe Mass--> Right Video-assisted thoracoscopy with a moderate-to-large wedge resection of the RLL (by Dr. Haris Mcconnell @ [facility])--> Poorly differentiated carcinoma of the RLL.      History of repair of hip joint 05/21/2013    History of suicide attempt 05/20/2024    Hyperlipidemia 08/11/2014    Hypothyroidism, unspecified 03/03/2022    Infection due to extended spectrum beta lactamase (ESBL) producing bacteria 03/11/2016    No A2K system hx. +ESBL E coli urine on 3/11/16.      Irritable bowel syndrome 04/15/2019    Long term (current) use of immunosuppressive biologic 04/28/2021    Long term current use of anticoagulant therapy 01/18/2024    Malignant neoplasm of lung 08/10/2017    Menopausal flushing 08/30/2021    Mitral valve regurgitation 07/06/2015    Mixed anxiety and depressive disorder 04/30/2015    Non-small cell lung cancer 08/10/2017    Nonobstructive atherosclerosis of coronary artery 06/02/2019 08/12/2022: CATH: Prashant: NSTEMI assoc with Covid-19: LM:-nl;  LAD: diffuse, Ca++; 30%; CIRC: Dominant. Normal; RCA: small; 50% diffuse, proximal and mid-segment.      NSTEMI (non-ST elevated myocardial infarction) 08/11/2022    Obsessive-compulsive disorder 04/15/2019    Osteoarthritis 05/20/2024    Paroxysmal atrial fibrillation 05/11/2017    Paroxysmal supraventricular tachycardia 05/11/2017    Peripheral neuropathy 08/11/2014    Personal history of peptic ulcer disease 11/12/2020    Postoperative anemia due to acute blood loss 05/22/2013    Right wrist fracture 03/01/2022    Seasonal allergies 08/10/2017    Secondary hyperparathyroidism of renal origin 09/14/2015    Tricuspid valve regurgitation 03/15/2017    Ulcer of lower extremity 08/30/2021    Unspecified acute appendicitis 03/03/2022    Valvular  heart disease 05/20/2024    Wegener's granulomatosis with renal involvement 03/03/2022     Past Surgical History:   Procedure Laterality Date    APPENDECTOMY      BREAST SURGERY Left     cysts rmeoved    CARDIAC CATHETERIZATION Right 08/12/2022    Procedure: Left Heart Cath and coronary angiogram;  Surgeon: Jak Gavin MD;  Location: [facility] CATH INVASIVE LOCATION;  Service: Cardiology;  Laterality: Right;    CLOSED REDUCTION WRIST FRACTURE Right 03/01/2022    Procedure: WRIST CLOSED REDUCTION;  Surgeon: Gaudencio Townsend MD;  Location: [facility] MAIN OR;  Service: Orthopedics;  Laterality: Right;    CYSTOSCOPY      HIP ARTHROPLASTY      HYSTERECTOMY      LUNG LOBECTOMY Right     TRANSPLANTATION RENAL       Family History   Problem Relation Age of Onset    No Known Problems Mother     No Known Problems Father      Social History     Tobacco Use    Smoking status: Former    Smokeless tobacco: Never   Vaping Use    Vaping status: Former   Substance Use Topics    Alcohol use: Never    Drug use: Never     (Not in a hospital admission)    Allergies:  Zolpidem    Objective    Vital Signs  Temp:  [99.8 °F (37.7 °C)-103.7 °F (39.8 °C)] 100.9 °F (38.3 °C)  Heart Rate:  [] 125  Resp:  [20-44] 44  BP: (128-166)/(61-90) 137/72       Physical Exam  Vitals reviewed.   HENT:      Head: Normocephalic.      Right Ear: External ear normal.      Nose: Nose normal.      Mouth/Throat:      Mouth: Mucous membranes are dry.   Eyes:      General:         Right eye: No discharge.         Left eye: No discharge.   Cardiovascular:      Rate and Rhythm: Tachycardia present.      Pulses: Normal pulses.      Heart sounds: Murmur heard.   Pulmonary:      Effort: Pulmonary effort is normal.      Breath sounds: Rhonchi present.   Abdominal:      General: Bowel sounds are normal.      Palpations: Abdomen is soft.   Musculoskeletal:         General: No swelling. Normal range of motion.      Right lower leg: No edema.      Left lower leg: No edema.    Skin:     General: Skin is warm and dry.   Neurological:      Mental Status: She is alert and oriented to person, place, and time.   Psychiatric:         Mood and Affect: Mood normal.         Behavior: Behavior normal.          Results Review:     Imaging Results (Last 24 Hours)       Procedure Component Value Units Date/Time    XR Chest 1 View [[id number]] Collected: 06/05/24 1244     Updated: 06/05/24 1248    Narrative:      XR CHEST 1 VW    Date of Exam: 6/5/2024 12:43 PM EDT    Indication: cough, soa    Comparison: AP portable chest 5/31/2024. CT chest 5/19/2024    Findings:  Advanced emphysematous changes are seen to better advantage on prior CT chest imaging. There is mild posterior medial left basilar airspace disease favored to represent atelectasis. Left lower lobe aeration appears improved since 5/31/2024. Trace right   basilar pleural effusion is unchanged. No significant left pleural effusion is identified. Benign calcified nodule in the left upper lobe. Heart size is enlarged but stable. No acute osseous abnormality      Impression:      Impression:    1. Improved aeration in the left lower lobe since 5/31/2024. Mild medial left basilar airspace disease remains, favored to represent atelectasis. Mild residual left basilar pneumonia not excluded  2. Stable small right pleural effusion. Left pleural effusion appears to have resolved.  3. Stable cardiomegaly. No overt features of edema.      Electronically Signed: Karie Bustos MD    6/5/2024 12:46 PM EDT    Workstation ID: [id number]    CT Abdomen Pelvis Without Contrast [[id number]] Collected: 06/05/24 1238     Updated: 06/05/24 1245    Narrative:      CT ABDOMEN PELVIS WO CONTRAST    Date of Exam: 6/5/2024 12:31 PM EDT    Indication: vomiting, fever.    Comparison: 5/21/2024    Technique: Axial CT images were obtained of the abdomen and pelvis without the administration of contrast. Sagittal and coronal reconstructions were performed.  Automated exposure  control and iterative reconstruction methods were used.      Findings:  LUNG BASES: Small left pleural effusion with left basal atelectasis, unchanged. Lower lung interstitial coarsening is similar.    LIVER:  Unremarkable parenchyma without focal lesion.  BILIARY/GALLBLADDER: There are calcified gallstones. There are no gallbladder inflammatory changes.  SPLEEN:  Unremarkable  PANCREAS:  Unremarkable  ADRENAL: Stable left adrenal nodule dating back to 2011.  KIDNEYS: Advanced bilateral native renal atrophy with no solid mass identified. There is a right lower quadrant transplant kidney. No obstruction.  There are bilateral native renal calcifications likely combination of vascular and collecting system   calculi.  GASTROINTESTINAL/MESENTERY:  No evidence of obstruction nor inflammation.    AORTA/IVC:  Normal caliber.    RETROPERITONEUM/LYMPH NODES:  Unremarkable    REPRODUCTIVE:  Unremarkable  BLADDER:  Unremarkable    OSSEUS STRUCTURES: There are bilateral total hip arthroplasties with associated streak and beam hardening artifact.      Impression:      Impression:  1.No acute process nor significant change identified.            Electronically Signed: Mark Kiran MD    6/5/2024 12:43 PM EDT    Workstation ID: [id number]             Lab Results (last 24 hours)       Procedure Component Value Units Date/Time    STAT Lactic Acid, Reflex [[id number]] Collected: 06/05/24 1603    Specimen: Blood Updated: 06/05/24 1605    Respiratory Panel PCR w/COVID-19(SARS-CoV-2) HODAN/MARIA M/TWILA/PAD/COR/JENISE In-House, NP Swab in UTM/VTM, 2 HR TAT - Swab, Nasopharynx [[id number]]  (Normal) Collected: 06/05/24 1224    Specimen: Swab from Nasopharynx Updated: 06/05/24 1326     ADENOVIRUS, PCR Not Detected     Coronavirus 229E Not Detected     Coronavirus HKU1 Not Detected     Coronavirus NL63 Not Detected     Coronavirus OC43 Not Detected     COVID19 Not Detected     Human Metapneumovirus Not Detected     Human Rhinovirus/Enterovirus Not  Detected     Influenza A PCR Not Detected     Influenza B PCR Not Detected     Parainfluenza Virus 1 Not Detected     Parainfluenza Virus 2 Not Detected     Parainfluenza Virus 3 Not Detected     Parainfluenza Virus 4 Not Detected     RSV, PCR Not Detected     Bordetella pertussis pcr Not Detected     Bordetella parapertussis PCR Not Detected     Chlamydophila pneumoniae PCR Not Detected     Mycoplasma pneumo by PCR Not Detected    Narrative:      In the setting of a positive respiratory panel with a viral infection PLUS a negative procalcitonin without other underlying concern for bacterial infection, consider observing off antibiotics or discontinuation of antibiotics and continue supportive care. If the respiratory panel is positive for atypical bacterial infection (Bordetella pertussis, Chlamydophila pneumoniae, or Mycoplasma pneumoniae), consider antibiotic de-escalation to target atypical bacterial infection.    Comprehensive Metabolic Panel [[id number]]  (Abnormal) Collected: 06/05/24 1223    Specimen: Blood Updated: 06/05/24 1308     Glucose 320 mg/dL      BUN 50 mg/dL      Comment: Result checked          Creatinine 1.45 mg/dL      Comment: Result checked          Sodium 134 mmol/L      Potassium 3.1 mmol/L      Chloride 87 mmol/L      CO2 28.9 mmol/L      Calcium 10.3 mg/dL      Total Protein 8.2 g/dL      Albumin 4.4 g/dL      ALT (SGPT) 17 U/L      AST (SGOT) 31 U/L      Alkaline Phosphatase 94 U/L      Total Bilirubin 1.4 mg/dL      Globulin 3.8 gm/dL      A/G Ratio 1.2 g/dL      BUN/Creatinine Ratio 34.5     Anion Gap 18.1 mmol/L      eGFR 37.2 mL/min/1.73     Narrative:      GFR Normal >60  Chronic Kidney Disease <60  Kidney Failure <15    The GFR formula is only valid for adults with stable renal function between ages 18 and 70.    Procalcitonin [[id number]]  (Abnormal) Collected: 06/05/24 1223    Specimen: Blood Updated: 06/05/24 1305     Procalcitonin 2.39 ng/mL     Narrative:      As a Marker for  "Sepsis (Non-Neonates):    1. <0.5 ng/mL represents a low risk of severe sepsis and/or septic shock.  2. >2 ng/mL represents a high risk of severe sepsis and/or septic shock.    As a Marker for Lower Respiratory Tract Infections that require antibiotic therapy:    PCT on Admission    Antibiotic Therapy       6-12 Hrs later    >0.5                Strongly Recommended  >0.25 - <0.5        Recommended   0.1 - 0.25          Discouraged              Remeasure/reassess PCT  <0.1                Strongly Discouraged     Remeasure/reassess PCT    As 28 day mortality risk marker: \"Change in Procalcitonin Result\" (>80% or <=80%) if Day 0 (or Day 1) and Day 4 values are available. Refer to http://[email or web address]faxNandi Proteinspct-calculator.com    Change in PCT <=80%  A decrease of PCT levels below or equal to 80% defines a positive change in PCT test result representing a higher risk for 28-day all-cause mortality of patients diagnosed with severe sepsis for septic shock.    Change in PCT >80%  A decrease of PCT levels of more than 80% defines a negative change in PCT result representing a lower risk for 28-day all-cause mortality of patients diagnosed with severe sepsis or septic shock.       Urinalysis, Microscopic Only - Straight Cath [[id number]]  (Abnormal) Collected: 06/05/24 1224    Specimen: Urine from Straight Cath Updated: 06/05/24 1302     RBC, UA None Seen /HPF      WBC, UA Too Numerous to Count /HPF      Bacteria, UA 3+ /HPF      Squamous Epithelial Cells, UA None Seen /HPF      Hyaline Casts, UA None Seen /LPF      Methodology Manual Light Microscopy    Urine Culture - Urine, Straight Cath [[id number]] Collected: 06/05/24 1224    Specimen: Urine from Straight Cath Updated: 06/05/24 1302    Urinalysis With Culture If Indicated - Straight Cath [[id number]]  (Abnormal) Collected: 06/05/24 1224    Specimen: Urine from Straight Cath Updated: 06/05/24 1247     Color, UA Yellow     Appearance, UA Slightly Cloudy     pH, UA 5.5     Specific " Gravity, UA 1.022     Glucose, UA >=1000 mg/dL (3+)     Ketones, UA Trace     Bilirubin, UA Negative     Blood, UA Trace     Protein, UA 30 mg/dL (1+)     Leuk Esterase, UA Moderate (2+)     Nitrite, UA Negative     Urobilinogen, UA 1.0 E.U./dL    Narrative:      In absence of clinical symptoms, the presence of pyuria, bacteria, and/or nitrites on the urinalysis result does not correlate with infection.    POC Lactate [[id number]]  (Abnormal) Collected: 06/05/24 1244    Specimen: Blood Updated: 06/05/24 1247     Lactate 3.2 mmol/L      Comment: Serial Number: [id number]:  [id number]       Extra Tubes [[id number]] Collected: 06/05/24 1223    Specimen: Blood, Venous Line Updated: 06/05/24 1245    Narrative:      The following orders were created for panel order Extra Tubes.  Procedure                               Abnormality         Status                     ---------                               -----------         ------                     Gold Top - SST[[id number]]                                   Final result               Light Blue Top[[id number]]                                   Final result                 Please view results for these tests on the individual orders.    Gold Top - SST [[id number]] Collected: 06/05/24 1223    Specimen: Blood Updated: 06/05/24 1245     Extra Tube Hold for add-ons.     Comment: Auto resulted.       Light Blue Top [[id number]] Collected: 06/05/24 1223    Specimen: Blood Updated: 06/05/24 1245     Extra Tube Hold for add-ons.     Comment: Auto resulted       CBC & Differential [[id number]]  (Abnormal) Collected: 06/05/24 1223    Specimen: Blood Updated: 06/05/24 1240    Narrative:      The following orders were created for panel order CBC & Differential.  Procedure                               Abnormality         Status                     ---------                               -----------         ------                     CBC Auto Differential[[id number]]        Abnormal             Final result                 Please view results for these tests on the individual orders.    CBC Auto Differential [[id number]]  (Abnormal) Collected: 06/05/24 1223    Specimen: Blood Updated: 06/05/24 1240     WBC 23.12 10*3/mm3      RBC 4.66 10*6/mm3      Hemoglobin 13.4 g/dL      Hematocrit 43.5 %      MCV 93.3 fL      MCH 28.8 pg      MCHC 30.8 g/dL      RDW 16.9 %      RDW-SD 58.2 fl      MPV 10.3 fL      Platelets 271 10*3/mm3      Neutrophil % 87.5 %      Lymphocyte % 2.7 %      Monocyte % 8.3 %      Eosinophil % 0.2 %      Basophil % 0.2 %      Immature Grans % 1.1 %      Neutrophils, Absolute 20.25 10*3/mm3      Lymphocytes, Absolute 0.62 10*3/mm3      Monocytes, Absolute 1.92 10*3/mm3      Eosinophils, Absolute 0.04 10*3/mm3      Basophils, Absolute 0.04 10*3/mm3      Immature Grans, Absolute 0.25 10*3/mm3      nRBC 0.1 /100 WBC     Blood Culture - Blood, Arm, Left [[id number]] Collected: 06/05/24 1223    Specimen: Blood from Arm, Left Updated: 06/05/24 1233    Blood Culture - Blood, Arm, Left [[id number]] Collected: 06/05/24 1223    Specimen: Blood from Arm, Left Updated: 06/05/24 1233             I reviewed the patient's new clinical results.    Assessment & Plan    Urinary tract infection  Fever, 103.7  Sepsis  Leukocytosis  -Lactic acid 3.2 with Pro-Prabhjot 2.39  -Will start Rocephin and follow culture  -Gentle hydration    Acute on chronic kidney disease  Elevated creatinine  History of renal transplant on antirejection medication prednisone and Prograf  -Gentle hydration  -Appreciate nephrology's input    Hypokalemia, replace    Congestive heart failure  -Gentle hydration  -Diuretics on hold  -Continue Jardiance    Atrial fibrillation  Macrocytic anemia  Chronic coronary artery disease  Pulmonary hypertension    Diet: Healthy heart, renal  DVT prophylaxis: SCDs  GI prophylaxis: Protonix  Code status: Full code      I discussed the patient's findings and my recommendations with patient.     Mariangel  TONYA Stearns  06/05/24  16:11 EDT        Electronically signed by Juanis Lowe MD at 06/05/24 1800

## 2024-06-06 NOTE — CASE MANAGEMENT/SOCIAL WORK
"Discharge Planning Assessment   [facility]     Patient Name: Jaja Carcamo  MRN: [id number]  Today's Date: 6/6/2024    Admit Date: 6/5/2024    Plan: MS Plan: Return to [facility] pending acceptance. Precert will be required. No PASRR needed. Will need transport.   Discharge Needs Assessment       Row Name 06/06/24 1541       Living Environment    People in Home alone    Current Living Arrangements other (see comments)  SKilled Rehab    Potentially Unsafe Housing Conditions none    In the past 12 months has the electric, gas, oil, or water company threatened to shut off services in your home? No    Primary Care Provided by other (see comments)  currently cared for by facility staff    Provides Primary Care For no one, unable/limited ability to care for self    Family Caregiver if Needed child(katarzyna), adult    Family Caregiver Names Son Aris \"Harish\"    Quality of Family Relationships helpful;involved;supportive    Able to Return to Prior Arrangements yes       Resource/Environmental Concerns    Resource/Environmental Concerns none    Transportation Concerns none       Transportation Needs    In the past 12 months, has lack of transportation kept you from medical appointments or from getting medications? no    In the past 12 months, has lack of transportation kept you from meetings, work, or from getting things needed for daily living? No       Food Insecurity    Within the past 12 months, you worried that your food would run out before you got the money to buy more. Never true    Within the past 12 months, the food you bought just didn't last and you didn't have money to get more. Never true       Transition Planning    Patient/Family Anticipates Transition to inpatient rehabilitation facility    Patient/Family Anticipated Services at Transition skilled nursing    Transportation Anticipated health plan transportation       Discharge Needs Assessment    Readmission Within the Last 30 Days previous discharge plan " unsuccessful    Current Outpatient/Agency/Support Group skilled nursing facility  [facility]    Equipment Currently Used at Home bath bench;oxygen  O2@3-4 liters per [place]    Concerns to be Addressed discharge planning    Anticipated Changes Related to Illness none    Equipment Needed After Discharge none    Outpatient/Agency/Support Group Needs skilled nursing facility    Discharge Facility/Level of Care Needs nursing facility, skilled    Provided Post Acute Provider List? N/A    Provided Post Acute Provider Quality & Resource List? N/A    Patient's Choice of Community Agency(s) Agreeable to return to [facility] if needed.    Current Discharge Risk physical impairment;lives alone                   Discharge Plan       Row Name 06/06/24 1543       Plan    Plan DC Plan: Return to [facility] pending acceptance. Precert will be required. No PASRR needed. Will need transport.    Patient/Family in Agreement with Plan yes    Provided Post Acute Provider List? N/A    Provided Post Acute Provider Quality & Resource List? N/A    Plan Comments CM spoke with patient at bedside to discuss admission assessment and discharge planning. Patient confirms PCP and pharmacy. Patient denies any difficulty affording medications at this time. Patient is agreeable to return to [facility] if recommended. CM placed referral in basket for AW and liaison notified. Awaiting confirmation of acceptance. Patient states she will likely need transportation to return to the facility when ready for DC. Patient reports she is mostly IADL's at baseline. CM will continue to follow for any further needs and adjust discharge plan accordingly. DC Barriers: Cardiac monitoring, O2@4L nc, Cardizem gtt, IVF's, IV abx, Febrile, monitor labs, and pending cultures.                  Continued Care and Services - Admitted Since 6/5/2024       Destination       Service Provider Request Status Selected Services Address Phone Fax Patient Preferred     [facility]ending - Request Sent N/A [address] [facility] [place] [address] [phone] --                  Selected Continued Care - Prior Encounters Includes continued care and service providers with selected services from prior encounters from 3/7/2024 to 6/6/2024      Discharged on 6/3/2024 Admission date: 5/24/2024 - Discharge disposition: Skilled Nursing Facility (DC - External)      Destination       Service Provider Selected Services Address Phone Fax Patient Preferred    [facility] [address] [facility] [place] [address] [phone] --                      Discharged on 5/22/2024 Admission date: 5/17/2024 - Discharge disposition: Home-Health Care [facility]      Home Medical Care       Service Provider Selected Services Address Phone Fax Patient Preferred    [facility] [place] [address] [phone] --                          Expected Discharge Date and Time       Expected Discharge Date Expected Discharge Time    Ismael 10, 2024            Demographic Summary       Row Name 06/06/24 1540       General Information    Admission Type observation    Arrived From emergency department    Required Notices Provided Observation Status Notice    Referral Source admission list    Reason for Consult discharge planning    Preferred Language English       Contact Information    Permission Granted to Share Info With                    Functional Status       Row Name 06/06/24 1540       Functional Status    Usual Activity Tolerance moderate    Current Activity Tolerance moderate    Functional Status Comments Family assisted patient with answers to admission questions       Physical Activity    On average, how many days per week do you engage in moderate to strenuous exercise (like a brisk walk)? 0 days    On average, how many minutes do you engage in exercise at  this level? 0 min    Number of minutes of exercise per week 0       Functional Status, IADL    Medications independent    Meal Preparation assistive person    Housekeeping assistive person    Laundry assistive person    Shopping assistive person       Mental Status    General Appearance WDL WDL       Mental Status Summary    Recent Changes in Mental Status/Cognitive Functioning no changes       Employment/    Employment Status retired    Current or Previous Occupation not applicable           Current or Previous  Service none               Met with patient in room wearing PPE: gloves, gown.      Maintain distance greater than six feet and spent less than fifteen minutes in the room.    Karina Teresa RN     Office Phone: [phone]  Office Cell:     [phone]

## 2024-06-06 NOTE — CONSULTS
RENAL/KCC:    Referring Provider: Dr. Lowe  Reason for Consultation: TONYA/CKD, Kidney transplant    Subjective     Chief complaint: Fever, Weakness, Nausea/Vomiting    History of present illness:  Patient is a 80 yo WF with h/o DDKT in 2017 who was recently admitted with fluid overload and CHF exacerbation.  She presents from rehab now with fever, weakness and nausea.  Baseline Cr runs 0.8-1.  Cr was 1.4 on admission with elevated Lactate.  HR also in the 140's and she is currently in the CCU on Diltiazem gtt.  Potassium is low.  Here edema is resolved.  She had been on Bumex and metolazone.  UA consistent with UTI.  Main complaint this AM is nausea.    History  Past Medical History:   Diagnosis Date    Allergic rhinitis 04/14/2015    Asthma 08/10/2017    Atrial flutter 05/11/2017    Chronic diarrhea 04/15/2019    Chronic hypoxemic respiratory failure 01/18/2024    Chronic pain 02/03/2015    COPD (chronic obstructive pulmonary disease)     COVID-19 virus detected 08/11/2022    Cytokine release syndrome, grade 1 08/16/2022    Edema of both lower extremities due to peripheral venous insufficiency 03/12/2021    ESRD on hemodialysis 05/22/2013    Essential (primary) hypertension 03/03/2022    Fracture of ulnar styloid 05/19/2022    Fracture of unspecified carpal bone, right wrist, subsequent encounter for fracture with routine healing 03/03/2022    Gastroesophageal reflux disease 11/12/2020    Gout 08/10/2017    Hearing loss 08/10/2017    History of appendectomy     History of DVT (deep vein thrombosis) 11/12/2020    History of kidney transplant 06/30/2017    History of lobectomy of lung 01/18/2024 03/08/2016:  RIGHT Lower Lobe Mass--> Right Video-assisted thoracoscopy with a moderate-to-large wedge resection of the RLL (by Dr. Haris Mcconnell @ [facility])--> Poorly differentiated carcinoma of the RLL.      History of repair of hip joint 05/21/2013    History of suicide attempt 05/20/2024    Hyperlipidemia  08/11/2014    Hypothyroidism, unspecified 03/03/2022    Infection due to extended spectrum beta lactamase (ESBL) producing bacteria 03/11/2016    No A2K system hx. +ESBL E coli urine on 3/11/16.      Irritable bowel syndrome 04/15/2019    Long term (current) use of immunosuppressive biologic 04/28/2021    Long term current use of anticoagulant therapy 01/18/2024    Malignant neoplasm of lung 08/10/2017    Menopausal flushing 08/30/2021    Mitral valve regurgitation 07/06/2015    Mixed anxiety and depressive disorder 04/30/2015    Non-small cell lung cancer 08/10/2017    Nonobstructive atherosclerosis of coronary artery 06/02/2019 08/12/2022: CATH: [facility]: NSTEMI assoc with Covid-19: LM:-nl;  LAD: diffuse, Ca++; 30%; CIRC: Dominant. Normal; RCA: small; 50% diffuse, proximal and mid-segment.      NSTEMI (non-ST elevated myocardial infarction) 08/11/2022    Obsessive-compulsive disorder 04/15/2019    Osteoarthritis 05/20/2024    Paroxysmal atrial fibrillation 05/11/2017    Paroxysmal supraventricular tachycardia 05/11/2017    Peripheral neuropathy 08/11/2014    Personal history of peptic ulcer disease 11/12/2020    Postoperative anemia due to acute blood loss 05/22/2013    Right wrist fracture 03/01/2022    Seasonal allergies 08/10/2017    Secondary hyperparathyroidism of renal origin 09/14/2015    Tricuspid valve regurgitation 03/15/2017    Ulcer of lower extremity 08/30/2021    Unspecified acute appendicitis 03/03/2022    Valvular heart disease 05/20/2024    Wegener's granulomatosis with renal involvement 03/03/2022   ,   Past Surgical History:   Procedure Laterality Date    APPENDECTOMY      BREAST SURGERY Left     cysts rmeoved    CARDIAC CATHETERIZATION Right 08/12/2022    Procedure: Left Heart Cath and coronary angiogram;  Surgeon: Jak Gavin MD;  Location: [facility] INVASIVE LOCATION;  Service: Cardiology;  Laterality: Right;    CLOSED REDUCTION WRIST FRACTURE Right 03/01/2022    Procedure: WRIST  CLOSED REDUCTION;  Surgeon: Gaudencio Townsend MD;  Location: [facility] MAIN OR;  Service: Orthopedics;  Laterality: Right;    CYSTOSCOPY      HIP ARTHROPLASTY      HYSTERECTOMY      LUNG LOBECTOMY Right     TRANSPLANTATION RENAL     ,   Family History   Problem Relation Age of Onset    No Known Problems Mother     No Known Problems Father    ,   Social History     Socioeconomic History    Marital status:    Tobacco Use    Smoking status: Former     Passive exposure: Never    Smokeless tobacco: Never   Vaping Use    Vaping status: Former   Substance and Sexual Activity    Alcohol use: Never    Drug use: Never    Sexual activity: Defer     E-cigarette/Vaping    E-cigarette/Vaping Use Former User     Passive Exposure No     Counseling Given No      E-cigarette/Vaping Substances    Nicotine No     THC No     CBD No     Flavoring No      E-cigarette/Vaping Devices    Disposable No     Pre-filled or Refillable Cartridge No     Refillable Tank No     Pre-filled Pod No          ,   Medications Prior to Admission   Medication Sig Dispense Refill Last Dose    acetaminophen (Tylenol) 325 MG tablet Take 2 tablets by mouth Every 4 (Four) Hours As Needed for Fever or Mild Pain.   6/5/2024    apixaban (ELIQUIS) 5 MG tablet tablet Take 1 tablet by mouth Every 12 (Twelve) Hours. 60 tablet 0 6/5/2024    colestipol (COLESTID) 1 g tablet Take 1 tablet by mouth Daily.   6/5/2024    diazePAM (VALIUM) 5 MG tablet Take 1 tablet by mouth 2 (Two) Times a Day As Needed for Anxiety.   6/4/2024    Diclofenac Sodium (Aspercreme Arthritis Pain) 1 % gel gel Apply 4 g topically to the appropriate area as directed 2 (Two) Times a Day As Needed (pain).   6/5/2024    dilTIAZem (CARDIZEM) 90 MG tablet Take 1 tablet by mouth Every 8 (Eight) Hours. 60 tablet 0 6/5/2024    DULoxetine HCl 40 MG capsule delayed-release particles Take 1 capsule by mouth Daily.   6/5/2024    empagliflozin (JARDIANCE) 25 MG tablet tablet Take 1 tablet by mouth Daily. 30 tablet 0  6/5/2024    ferrous sulfate 324 (65 Fe) MG tablet delayed-release EC tablet Take 1 tablet by mouth Daily With Breakfast. 30 tablet 0 6/5/2024    furosemide (LASIX) 80 MG tablet Take 1 tablet by mouth 2 (Two) Times a Day. 60 tablet 0 6/5/2024    levothyroxine (SYNTHROID, LEVOTHROID) 50 MCG tablet Take 1 tablet by mouth Daily.   6/5/2024    metOLazone (ZAROXOLYN) 5 MG tablet Take 1 tablet by mouth 3 (Three) Times a Week. Monday, Wednesday, Friday 6/5/2024    metoprolol succinate XL (TOPROL-XL) 50 MG 24 hr tablet Take 1 tablet by mouth Daily.   6/5/2024    potassium chloride (KLOR-CON M20) 20 MEQ CR tablet Take 2 tablets by mouth Daily. 60 tablet 0 6/5/2024    predniSONE (DELTASONE) 5 MG tablet Take 1 tablet by mouth Daily.   6/5/2024    tacrolimus (PROGRAF) 1 MG capsule Take 1 capsule by mouth 2 (Two) Times a Day.   6/5/2024    albuterol sulfate  (90 Base) MCG/ACT inhaler Inhale 2 puffs Every 6 (Six) Hours As Needed for Wheezing.       Carboxymethylcellulose Sodium (DRY EYE RELIEF OP) Apply 2 drops to eye(s) as directed by provider 2 (Two) Times a Day As Needed (dry eyes).       GUAIFENESIN 1200 PO Take 1,200 mg by mouth Every 12 (Twelve) Hours As Needed (for cough).       loperamide (IMODIUM) 2 MG capsule Take 2 mg by mouth 4 (Four) Times a Day As Needed for Diarrhea.      , Scheduled Meds:  apixaban, 5 mg, Oral, Q12H  cefTRIAXone, 2,000 mg, Intravenous, Q24H  ferrous sulfate, 324 mg, Oral, Daily With Breakfast  levothyroxine, 50 mcg, Oral, Q AM  metoprolol succinate XL, 50 mg, Oral, Daily  pantoprazole, 40 mg, Intravenous, QAM  potassium chloride, 30 mEq, Oral, Once  predniSONE, 5 mg, Oral, Daily  sodium chloride, 10 mL, Intravenous, Q12H  tacrolimus, 1 mg, Oral, BID   , Continuous Infusions:  dilTIAZem, 5-15 mg/hr, Last Rate: 10 mg/hr (06/06/24 0622)  sodium chloride, 100 mL/hr   , PRN Meds:    acetaminophen    Calcium Replacement - Follow Nurse / BPA Driven Protocol    diazePAM    loperamide    Magnesium  "Standard Dose Replacement - Follow Nurse / BPA Driven Protocol    melatonin    ondansetron    Phosphorus Replacement - Follow Nurse / BPA Driven Protocol    Potassium Replacement - Follow Nurse / BPA Driven Protocol    [COMPLETED] Insert Peripheral IV **AND** sodium chloride    sodium chloride    sodium chloride, and Allergies:  Zolpidem    Review of Systems  Pertinent items are noted in HPI    Objective     Vital Signs  Temp:  [97.2 °F (36.2 °C)-103.7 °F (39.8 °C)] 97.7 °F (36.5 °C)  Heart Rate:  [] 140  Resp:  [20-44] 44  BP: (128-166)/(61-90) 130/64    No intake/output data recorded.  I/O last 3 completed shifts:  In: 1100 [IV Piggyback:1100]  Out: -     Physical Exam:  GEN: Awake, NAD  ENT: PERRL, EOMI, MMM  NECK: Supple, no JVD  CHEST: CTAB, no W/R/C  CV: RRR, no M/G/R  ABD: Soft, NT, +BS  SKIN: Warm and Dry  NEURO: CN's intact      Results Review:   I reviewed the patient's new clinical results.    WBC WBC   Date Value Ref Range Status   06/06/2024 19.84 (H) 3.40 - 10.80 10*3/mm3 Final   06/05/2024 23.12 (H) 3.40 - 10.80 10*3/mm3 Final   06/03/2024 13.71 (H) 3.40 - 10.80 10*3/mm3 Final      HGB Hemoglobin   Date Value Ref Range Status   06/06/2024 13.5 12.0 - 15.9 g/dL Final   06/05/2024 13.4 12.0 - 15.9 g/dL Final   06/03/2024 10.7 (L) 12.0 - 15.9 g/dL Final      HCT Hematocrit   Date Value Ref Range Status   06/06/2024 43.6 34.0 - 46.6 % Final   06/05/2024 43.5 34.0 - 46.6 % Final   06/03/2024 35.2 34.0 - 46.6 % Final      Platlets No results found for: \"LABPLAT\"   MCV MCV   Date Value Ref Range Status   06/06/2024 93.6 79.0 - 97.0 fL Final   06/05/2024 93.3 79.0 - 97.0 fL Final   06/03/2024 96.4 79.0 - 97.0 fL Final          Sodium Sodium   Date Value Ref Range Status   06/06/2024 139 136 - 145 mmol/L Final   06/05/2024 134 (L) 136 - 145 mmol/L Final   06/03/2024 135 (L) 136 - 145 mmol/L Final      Potassium Potassium   Date Value Ref Range Status   06/06/2024 3.2 (L) 3.5 - 5.2 mmol/L Final " "  06/05/2024 3.1 (L) 3.5 - 5.2 mmol/L Final   06/03/2024 3.7 3.5 - 5.2 mmol/L Final      Chloride Chloride   Date Value Ref Range Status   06/06/2024 93 (L) 98 - 107 mmol/L Final   06/05/2024 87 (L) 98 - 107 mmol/L Final   06/03/2024 98 98 - 107 mmol/L Final      CO2 CO2   Date Value Ref Range Status   06/06/2024 29.6 (H) 22.0 - 29.0 mmol/L Final   06/05/2024 28.9 22.0 - 29.0 mmol/L Final   06/03/2024 27.7 22.0 - 29.0 mmol/L Final      BUN BUN   Date Value Ref Range Status   06/06/2024 47 (H) 8 - 23 mg/dL Final   06/05/2024 50 (H) 8 - 23 mg/dL Final     Comment:     Result checked     06/03/2024 27 (H) 8 - 23 mg/dL Final      Creatinine Creatinine   Date Value Ref Range Status   06/06/2024 1.16 (H) 0.57 - 1.00 mg/dL Final   06/05/2024 1.45 (H) 0.57 - 1.00 mg/dL Final     Comment:     Result checked     06/03/2024 0.76 0.57 - 1.00 mg/dL Final      Calcium Calcium   Date Value Ref Range Status   06/06/2024 10.2 8.6 - 10.5 mg/dL Final   06/05/2024 10.3 8.6 - 10.5 mg/dL Final   06/03/2024 10.3 8.6 - 10.5 mg/dL Final      PO4 No results found for: \"CAPO4\"   Albumin Albumin   Date Value Ref Range Status   06/05/2024 4.4 3.5 - 5.2 g/dL Final      Magnesium No results found for: \"MG\"   Uric Acid No results found for: \"URICACID\"         apixaban, 5 mg, Oral, Q12H  cefTRIAXone, 2,000 mg, Intravenous, Q24H  ferrous sulfate, 324 mg, Oral, Daily With Breakfast  levothyroxine, 50 mcg, Oral, Q AM  metoprolol succinate XL, 50 mg, Oral, Daily  pantoprazole, 40 mg, Intravenous, QAM  potassium chloride, 30 mEq, Oral, Once  predniSONE, 5 mg, Oral, Daily  sodium chloride, 10 mL, Intravenous, Q12H  tacrolimus, 1 mg, Oral, BID      dilTIAZem, 5-15 mg/hr, Last Rate: 10 mg/hr (06/06/24 0622)  sodium chloride, 100 mL/hr        Assessment & Plan     1) TONYA  2) DDKT  3) Chronic ImmunoRx  4) UTI  5) Lactic acidosis  6) Tachycardia  7) Hypokalemia  8) CHF    PLAN: Cr improving towards baseline.  Hold diuretics today and give 1L NS.  ABX for UTI " per primary.  D/C Jardiance (patient at high risk of UTI's already).  Replace K.  Diltiazem per primary.  Check FK level in AM.  Will follow.      Naun Falcon MD  [facility]  06/06/24  @NOW

## 2024-06-06 NOTE — PLAN OF CARE
Goal Outcome Evaluation:  Plan of Care Reviewed With: patient        Progress: improving          Patient continue on Cardizem drip to help with afib and heart rate. Heart improved from this morning. Continues on IV antibiotics related to urinary tract infection. 0 adverse reactions noted. Fluids given as ordered. Son at bedside and updated on patients condition.

## 2024-06-06 NOTE — PROGRESS NOTES
LOS: 0 days   Patient Care Team:  Jonathan Luna APRN as PCP - General (Family Medicine)  Jak Gavin MD as Cardiologist (Cardiology)    Subjective     Interval History: Hemodynamically stable    Patient Complaints: Generally weak    History taken from: patient    Review of Systems   Constitutional:  Positive for activity change, appetite change and fatigue. Negative for chills and fever.   HENT:  Negative for facial swelling.    Eyes:  Negative for visual disturbance.   Respiratory:  Negative for cough, shortness of breath and stridor.    Cardiovascular:  Negative for chest pain, palpitations and leg swelling.   Gastrointestinal:  Negative for abdominal pain, constipation, diarrhea, nausea and vomiting.   Genitourinary:  Negative for hematuria.   Musculoskeletal:  Positive for arthralgias, back pain and gait problem.   Neurological:  Positive for weakness.   Psychiatric/Behavioral:  Negative for confusion.            Objective     Vital Signs  Temp:  [96.5 °F (35.8 °C)-99.6 °F (37.6 °C)] 97.6 °F (36.4 °C)  Heart Rate:  [] 96  Resp:  [23-44] 32  BP: (121-145)/(59-89) 121/84    Physical Exam:     General Appearance:    Alert, cooperative, in no acute distress, op- dry   Head:    Normocephalic, without obvious abnormality, atraumatic   Eyes:            Lids and lashes normal, conjunctivae and sclerae normal, no   icterus, no pallor, corneas clear, PERRLA   Ears:    Ears appear intact with no abnormalities noted   Throat:   No oral lesions, no thrush, oral mucosa dry   Neck:   No adenopathy, supple, trachea midline, no thyromegaly, no   carotid bruit, no JVD   Lungs:     Clear to auscultation,respirations regular, even and                  unlabored    Heart:    Regular rhythm and normal rate, normal S1 and S2, no            murmur, no gallop, no rub, no click   Chest Wall:    No abnormalities observed   Abdomen:     Normal bowel sounds, no masses, no organomegaly, soft        Non-tender non-distended,  no guarding,   Extremities:  No edema   Pulses:   Pulses palpable and equal bilaterally   Skin:   No bleeding, bruising or rash   Lymph nodes:   No palpable adenopathy   Neurologic:   Cranial nerves 2 - 12 grossly intact, sensation intact, DTR       present and equal bilaterally        Results Review:    Lab Results (last 24 hours)       Procedure Component Value Units Date/Time    Urine Culture - Urine, Straight Cath [[id number]]  (Abnormal) Collected: 06/05/24 1224    Specimen: Urine from Straight Cath Updated: 06/06/24 1322     Urine Culture >100,000 CFU/mL Escherichia coli    Narrative:      Colonization of the urinary tract without infection is common. Treatment is discouraged unless the patient is symptomatic, pregnant, or undergoing an invasive urologic procedure.    Blood Culture - Blood, Arm, Left [[id number]]  (Normal) Collected: 06/05/24 1223    Specimen: Blood from Arm, Left Updated: 06/06/24 1245     Blood Culture No growth at 24 hours    Blood Culture - Blood, Arm, Left [[id number]]  (Normal) Collected: 06/05/24 1223    Specimen: Blood from Arm, Left Updated: 06/06/24 1245     Blood Culture No growth at 24 hours    Narrative:      Less than seven (7) mL's of blood was collected.  Insufficient quantity may yield false negative results.    STAT Lactic Acid, Reflex [[id number]]  (Abnormal) Collected: 06/06/24 0847    Specimen: Blood Updated: 06/06/24 0927     Lactate 2.7 mmol/L     CANDIDA AURIS PCR - Swab, Axilla Right, Axilla Left and Groin [[id number]] Collected: 06/06/24 0848    Specimen: Swab from Axilla Right, Axilla Left and Groin Updated: 06/06/24 0853    STAT Lactic Acid, Reflex [[id number]]  (Abnormal) Collected: 06/06/24 0235    Specimen: Blood Updated: 06/06/24 0331     Lactate 3.2 mmol/L     Basic Metabolic Panel [[id number]]  (Abnormal) Collected: 06/06/24 0235    Specimen: Blood Updated: 06/06/24 0328     Glucose 201 mg/dL      BUN 47 mg/dL      Creatinine 1.16 mg/dL      Sodium 139 mmol/L       Potassium 3.2 mmol/L      Chloride 93 mmol/L      CO2 29.6 mmol/L      Calcium 10.2 mg/dL      BUN/Creatinine Ratio 40.5     Anion Gap 16.4 mmol/L      eGFR 48.7 mL/min/1.73     Narrative:      GFR Normal >60  Chronic Kidney Disease <60  Kidney Failure <15    The GFR formula is only valid for adults with stable renal function between ages 18 and 70.    CBC & Differential [[id number]]  (Abnormal) Collected: 06/06/24 0235    Specimen: Blood Updated: 06/06/24 0300    Narrative:      The following orders were created for panel order CBC & Differential.  Procedure                               Abnormality         Status                     ---------                               -----------         ------                     CBC Auto Differential[[id number]]        Abnormal            Final result                 Please view results for these tests on the individual orders.    CBC Auto Differential [[id number]]  (Abnormal) Collected: 06/06/24 0235    Specimen: Blood Updated: 06/06/24 0300     WBC 19.84 10*3/mm3      RBC 4.66 10*6/mm3      Hemoglobin 13.5 g/dL      Hematocrit 43.6 %      MCV 93.6 fL      MCH 29.0 pg      MCHC 31.0 g/dL      RDW 16.7 %      RDW-SD 57.6 fl      MPV 10.5 fL      Platelets 229 10*3/mm3      Neutrophil % 86.0 %      Lymphocyte % 3.6 %      Monocyte % 9.5 %      Eosinophil % 0.0 %      Basophil % 0.2 %      Immature Grans % 0.7 %      Neutrophils, Absolute 17.08 10*3/mm3      Lymphocytes, Absolute 0.71 10*3/mm3      Monocytes, Absolute 1.88 10*3/mm3      Eosinophils, Absolute 0.00 10*3/mm3      Basophils, Absolute 0.03 10*3/mm3      Immature Grans, Absolute 0.14 10*3/mm3      nRBC 0.1 /100 WBC     STAT Lactic Acid, Reflex [[id number]]  (Abnormal) Collected: 06/05/24 2056    Specimen: Blood from Arm, Left Updated: 06/05/24 2151     Lactate 2.6 mmol/L     POC Glucose Once [[id number]]  (Abnormal) Collected: 06/05/24 2043    Specimen: Blood Updated: 06/05/24 2044     Glucose 267 mg/dL      Comment:  Serial Number: [id number]:  [id number]       Gastrointestinal Panel, PCR - Stool, Per Rectum [[id number]]  (Normal) Collected: 06/05/24 1818    Specimen: Stool from Per Rectum Updated: 06/05/24 2034     Campylobacter Not Detected     Plesiomonas shigelloides Not Detected     Salmonella Not Detected     Vibrio Not Detected     Vibrio cholerae Not Detected     Yersinia enterocolitica Not Detected     Enteroaggregative E. coli (EAEC) Not Detected     Enteropathogenic E. coli (EPEC) Not Detected     Enterotoxigenic E. coli (ETEC) lt/st Not Detected     Shiga-like toxin-producing E. coli (STEC) stx1/stx2 Not Detected     Shigella/Enteroinvasive E. coli (EIEC) Not Detected     Cryptosporidium Not Detected     Cyclospora cayetanensis Not Detected     Entamoeba histolytica Not Detected     Giardia lamblia Not Detected     Adenovirus F40/41 Not Detected     Astrovirus Not Detected     Norovirus GI/GII Not Detected     Rotavirus A Not Detected     Sapovirus (I, II, IV or V) Not Detected    Narrative:      If Aeromonas, Staphylococcus aureus or Bacillus cereus are suspected, please order [id number]: Stool Culture, Aeromonas, S aureus, B Cereus.             Imaging Results (Last 24 Hours)       ** No results found for the last 24 hours. **                 I reviewed the patient's new clinical results.    Medication Review:   Scheduled Meds:apixaban, 5 mg, Oral, Q12H  cefTRIAXone, 2,000 mg, Intravenous, Q24H  ferrous sulfate, 324 mg, Oral, Daily With Breakfast  levothyroxine, 50 mcg, Oral, Q AM  metoprolol succinate XL, 50 mg, Oral, Daily  pantoprazole, 40 mg, Intravenous, QAM  predniSONE, 5 mg, Oral, Daily  sodium chloride, 10 mL, Intravenous, Q12H  tacrolimus, 1 mg, Oral, BID      Continuous Infusions:dilTIAZem, 5-15 mg/hr, Last Rate: 10 mg/hr (06/06/24 1723)      PRN Meds:.  acetaminophen    Calcium Replacement - Follow Nurse / BPA Driven Protocol    diazePAM    loperamide    Magnesium Standard Dose Replacement - Follow  Nurse / BPA Driven Protocol    melatonin    ondansetron    Phosphorus Replacement - Follow Nurse / BPA Driven Protocol    Potassium Replacement - Follow Nurse / BPA Driven Protocol    [COMPLETED] Insert Peripheral IV **AND** sodium chloride    sodium chloride    sodium chloride     Assessment & Plan       UTI (urinary tract infection)  - stopping Jardiance; continue ceftriaxone and monitor urine/blood cultures    Dehydration - due to diuresis (with addition of Jardiance) and poor oral intake.  Hydrating.      H/o kidney transplant - continue tacrolimus and prednisone; check level in am    Hypokalemia - replacing    Hypothyroidism - levothyroxine  Pulmonary hypertension  PAF - currently in sinus rhythm; on metoprolol; anticoagulated.      Plan for disposition:Return to SNF at discharge.    Juanis Lowe MD  06/06/24  19:59 EDT

## 2024-06-07 LAB
ANION GAP SERPL CALCULATED.3IONS-SCNC: 10.2 MMOL/L (ref 5–15)
BACTERIA BLD CULT: ABNORMAL
BACTERIA ID TEST ISLT QL CULT: ABNORMAL
BACTERIA SPEC AEROBE CULT: ABNORMAL
BASOPHILS # BLD AUTO: 0.04 10*3/MM3 (ref 0–0.2)
BASOPHILS NFR BLD AUTO: 0.3 % (ref 0–1.5)
BOTTLE TYPE: ABNORMAL
BUN SERPL-MCNC: 42 MG/DL (ref 8–23)
BUN/CREAT SERPL: 40.4 (ref 7–25)
C AURIS DNA SPEC QL NAA+NON-PROBE: NOT DETECTED
C DIFF GDH + TOXINS A+B STL QL IA.RAPID: POSITIVE
C DIFF GDH + TOXINS A+B STL QL IA.RAPID: POSITIVE
CALCIUM SPEC-SCNC: 9.4 MG/DL (ref 8.6–10.5)
CHLORIDE SERPL-SCNC: 92 MMOL/L (ref 98–107)
CO2 SERPL-SCNC: 28.8 MMOL/L (ref 22–29)
CREAT SERPL-MCNC: 1.04 MG/DL (ref 0.57–1)
D-LACTATE SERPL-SCNC: 1.8 MMOL/L (ref 0.5–2)
DEPRECATED RDW RBC AUTO: 56.5 FL (ref 37–54)
EGFRCR SERPLBLD CKD-EPI 2021: 55.5 ML/MIN/1.73
EOSINOPHIL # BLD AUTO: 0.12 10*3/MM3 (ref 0–0.4)
EOSINOPHIL NFR BLD AUTO: 0.8 % (ref 0.3–6.2)
ERYTHROCYTE [DISTWIDTH] IN BLOOD BY AUTOMATED COUNT: 16.3 % (ref 12.3–15.4)
GLUCOSE SERPL-MCNC: 203 MG/DL (ref 65–99)
HCT VFR BLD AUTO: 34.9 % (ref 34–46.6)
HGB BLD-MCNC: 10.6 G/DL (ref 12–15.9)
IMM GRANULOCYTES # BLD AUTO: 0.07 10*3/MM3 (ref 0–0.05)
IMM GRANULOCYTES NFR BLD AUTO: 0.5 % (ref 0–0.5)
LYMPHOCYTES # BLD AUTO: 0.5 10*3/MM3 (ref 0.7–3.1)
LYMPHOCYTES NFR BLD AUTO: 3.4 % (ref 19.6–45.3)
MAGNESIUM SERPL-MCNC: 1.9 MG/DL (ref 1.6–2.4)
MCH RBC QN AUTO: 28.6 PG (ref 26.6–33)
MCHC RBC AUTO-ENTMCNC: 30.4 G/DL (ref 31.5–35.7)
MCV RBC AUTO: 94.1 FL (ref 79–97)
MONOCYTES # BLD AUTO: 1.3 10*3/MM3 (ref 0.1–0.9)
MONOCYTES NFR BLD AUTO: 8.9 % (ref 5–12)
NEUTROPHILS NFR BLD AUTO: 12.6 10*3/MM3 (ref 1.7–7)
NEUTROPHILS NFR BLD AUTO: 86.1 % (ref 42.7–76)
NRBC BLD AUTO-RTO: 0.1 /100 WBC (ref 0–0.2)
PHOSPHATE SERPL-MCNC: 2.2 MG/DL (ref 2.5–4.5)
PLATELET # BLD AUTO: 199 10*3/MM3 (ref 140–450)
PMV BLD AUTO: 10.8 FL (ref 6–12)
POTASSIUM SERPL-SCNC: 2.8 MMOL/L (ref 3.5–5.2)
POTASSIUM SERPL-SCNC: 3.8 MMOL/L (ref 3.5–5.2)
RBC # BLD AUTO: 3.71 10*6/MM3 (ref 3.77–5.28)
SODIUM SERPL-SCNC: 131 MMOL/L (ref 136–145)
WBC NRBC COR # BLD AUTO: 14.63 10*3/MM3 (ref 3.4–10.8)

## 2024-06-07 PROCEDURE — 63710000001 PREDNISONE PER 5 MG

## 2024-06-07 PROCEDURE — 85025 COMPLETE CBC W/AUTO DIFF WBC: CPT

## 2024-06-07 PROCEDURE — 80048 BASIC METABOLIC PNL TOTAL CA: CPT

## 2024-06-07 PROCEDURE — 80197 ASSAY OF TACROLIMUS: CPT | Performed by: INTERNAL MEDICINE

## 2024-06-07 PROCEDURE — 83605 ASSAY OF LACTIC ACID: CPT | Performed by: EMERGENCY MEDICINE

## 2024-06-07 PROCEDURE — 25010000002 POTASSIUM CHLORIDE 10 MEQ/100ML SOLUTION: Performed by: INTERNAL MEDICINE

## 2024-06-07 PROCEDURE — 84100 ASSAY OF PHOSPHORUS: CPT | Performed by: INTERNAL MEDICINE

## 2024-06-07 PROCEDURE — 84132 ASSAY OF SERUM POTASSIUM: CPT | Performed by: INTERNAL MEDICINE

## 2024-06-07 PROCEDURE — 25010000002 MEROPENEM PER 100 MG: Performed by: INTERNAL MEDICINE

## 2024-06-07 PROCEDURE — 83735 ASSAY OF MAGNESIUM: CPT | Performed by: INTERNAL MEDICINE

## 2024-06-07 PROCEDURE — 63710000001 TACROLIMUS PER 1 MG

## 2024-06-07 PROCEDURE — 87324 CLOSTRIDIUM AG IA: CPT | Performed by: INTERNAL MEDICINE

## 2024-06-07 PROCEDURE — 87449 NOS EACH ORGANISM AG IA: CPT | Performed by: INTERNAL MEDICINE

## 2024-06-07 RX ORDER — SACCHAROMYCES BOULARDII 250 MG
250 CAPSULE ORAL 2 TIMES DAILY
Status: DISCONTINUED | OUTPATIENT
Start: 2024-06-07 | End: 2024-06-13 | Stop reason: HOSPADM

## 2024-06-07 RX ORDER — VANCOMYCIN HYDROCHLORIDE 250 MG/1
250 CAPSULE ORAL EVERY 6 HOURS SCHEDULED
Status: DISCONTINUED | OUTPATIENT
Start: 2024-06-07 | End: 2024-06-13 | Stop reason: HOSPADM

## 2024-06-07 RX ORDER — CARBOXYMETHYLCELLULOSE SODIUM 10 MG/ML
1 GEL OPHTHALMIC 4 TIMES DAILY PRN
Status: DISCONTINUED | OUTPATIENT
Start: 2024-06-07 | End: 2024-06-13 | Stop reason: HOSPADM

## 2024-06-07 RX ORDER — POTASSIUM CHLORIDE 7.45 MG/ML
10 INJECTION INTRAVENOUS
Status: COMPLETED | OUTPATIENT
Start: 2024-06-07 | End: 2024-06-07

## 2024-06-07 RX ORDER — DULOXETIN HYDROCHLORIDE 20 MG/1
40 CAPSULE, DELAYED RELEASE ORAL DAILY
Status: DISCONTINUED | OUTPATIENT
Start: 2024-06-07 | End: 2024-06-13 | Stop reason: HOSPADM

## 2024-06-07 RX ORDER — VANCOMYCIN HYDROCHLORIDE 125 MG/1
125 CAPSULE ORAL EVERY 6 HOURS SCHEDULED
Status: DISCONTINUED | OUTPATIENT
Start: 2024-06-07 | End: 2024-06-07

## 2024-06-07 RX ADMIN — Medication 2 PACKET: at 17:19

## 2024-06-07 RX ADMIN — ACETAMINOPHEN 650 MG: 325 TABLET, FILM COATED ORAL at 08:37

## 2024-06-07 RX ADMIN — DILTIAZEM HYDROCHLORIDE 30 MG: 30 TABLET, FILM COATED ORAL at 21:15

## 2024-06-07 RX ADMIN — MEROPENEM 1000 MG: 1 INJECTION, POWDER, FOR SOLUTION INTRAVENOUS at 13:29

## 2024-06-07 RX ADMIN — POTASSIUM CHLORIDE 10 MEQ: 7.46 INJECTION, SOLUTION INTRAVENOUS at 10:52

## 2024-06-07 RX ADMIN — LEVOTHYROXINE SODIUM 50 MCG: 0.05 TABLET ORAL at 05:22

## 2024-06-07 RX ADMIN — Medication 5 MG: at 01:04

## 2024-06-07 RX ADMIN — Medication 10 MG/HR: at 07:32

## 2024-06-07 RX ADMIN — FAMOTIDINE 20 MG: 10 INJECTION INTRAVENOUS at 08:37

## 2024-06-07 RX ADMIN — VANCOMYCIN HYDROCHLORIDE 125 MG: 125 CAPSULE ORAL at 11:47

## 2024-06-07 RX ADMIN — MEROPENEM 1000 MG: 1 INJECTION, POWDER, FOR SOLUTION INTRAVENOUS at 07:50

## 2024-06-07 RX ADMIN — TACROLIMUS 1 MG: 1 CAPSULE ORAL at 08:37

## 2024-06-07 RX ADMIN — Medication 5 MG: at 21:25

## 2024-06-07 RX ADMIN — APIXABAN 5 MG: 5 TABLET, FILM COATED ORAL at 21:15

## 2024-06-07 RX ADMIN — Medication 250 MG: at 21:15

## 2024-06-07 RX ADMIN — VANCOMYCIN HYDROCHLORIDE 125 MG: 125 CAPSULE ORAL at 08:37

## 2024-06-07 RX ADMIN — TACROLIMUS 1 MG: 1 CAPSULE ORAL at 21:15

## 2024-06-07 RX ADMIN — Medication 10 ML: at 08:37

## 2024-06-07 RX ADMIN — POTASSIUM CHLORIDE 10 MEQ: 7.46 INJECTION, SOLUTION INTRAVENOUS at 11:56

## 2024-06-07 RX ADMIN — PREDNISONE 5 MG: 5 TABLET ORAL at 08:37

## 2024-06-07 RX ADMIN — DULOXETINE HYDROCHLORIDE 40 MG: 20 CAPSULE, DELAYED RELEASE ORAL at 13:30

## 2024-06-07 RX ADMIN — APIXABAN 5 MG: 5 TABLET, FILM COATED ORAL at 08:37

## 2024-06-07 RX ADMIN — POTASSIUM CHLORIDE 10 MEQ: 7.46 INJECTION, SOLUTION INTRAVENOUS at 08:41

## 2024-06-07 RX ADMIN — MEROPENEM 1000 MG: 1 INJECTION, POWDER, FOR SOLUTION INTRAVENOUS at 21:15

## 2024-06-07 RX ADMIN — POTASSIUM CHLORIDE 10 MEQ: 7.46 INJECTION, SOLUTION INTRAVENOUS at 07:50

## 2024-06-07 RX ADMIN — DILTIAZEM HYDROCHLORIDE 30 MG: 30 TABLET, FILM COATED ORAL at 13:30

## 2024-06-07 RX ADMIN — METOPROLOL SUCCINATE 50 MG: 50 TABLET, EXTENDED RELEASE ORAL at 08:37

## 2024-06-07 RX ADMIN — DIAZEPAM 5 MG: 5 TABLET ORAL at 04:23

## 2024-06-07 RX ADMIN — FERROUS SULFATE TAB EC 324 MG (65 MG FE EQUIVALENT) 324 MG: 324 (65 FE) TABLET DELAYED RESPONSE at 07:51

## 2024-06-07 RX ADMIN — Medication 10 ML: at 21:16

## 2024-06-07 RX ADMIN — VANCOMYCIN HYDROCHLORIDE 250 MG: 250 CAPSULE ORAL at 17:19

## 2024-06-07 NOTE — CASE MANAGEMENT/SOCIAL WORK
Continued Stay Note   [facility]     Patient Name: Jaja Carcamo  MRN: [id number]  Today's Date: 6/7/2024    Admit Date: 6/5/2024    Plan: DC Plan: Return to [facility] pending acceptance. Precert will be required. No PASRR needed. Current with [facility] for Home O2@3-4 liters. Will need transport.   Discharge Plan       Row Name 06/07/24 1439       Plan    Plan DC Plan: Return to [facility] pending acceptance. Precert will be required. No PASRR needed. Current with [facility] for Home O2@3-4 liters. Will need transport.    Patient/Family in Agreement with Plan yes    Provided Post Acute Provider List? N/A    Provided Post Acute Provider Quality & Resource List? N/A    Plan Comments CM completed readmission assessment at bedside and copy of IMM delivered.CM reviewed chart documentation for clinical updates. CM will continue to follow for any further needs and adjust discharge plan accordingly. DC Barriers: Cardiac monitoring, O2@4L nc, IV abx/Electrolytes, + Cdiff, BM control, and monitor labs.               Expected Discharge Date and Time       Expected Discharge Date Expected Discharge Time    Ismael 10, 2024           Met with patient in room wearing PPE: gloves, gown.      Maintain distance greater than six feet and spent less than fifteen minutes in the room.      Karina Teresa RN    Office Phone: [phone]  Office Cell:     [phone]

## 2024-06-07 NOTE — CASE MANAGEMENT/SOCIAL WORK
Case Management Readmission Assessment Note    Case Management Readmission Assessment (all recorded)       Readmission Interview       Row Name 06/07/24 1123             Readmission Indications    Is the patient and/or family able to complete the readmission assessment questions? Yes      Is this hospitalization related to the prior hospital diagnosis? No  Previously here for CHF exacerbation, Septic this admission        Row Name 06/07/24 1123             Recommendation for rehospitalization    Did you speak with your physician prior to coming to the hospital Yes      If yes, what physician did you speak with? Facility practitioner        Row Name 06/07/24 1123             Follow-up Appointments    Do you have a PCP? Yes  Dr. Jonathan Luna      Did you have an appointment with PCP after your hospitalization? No  Only at Rehab for two days before returning.      Did you have an appointment with a Specialist? No      Are you current with the Pulmonary Clinic? No      Are you current with the CHF Clinic? No        Row Name 06/07/24 1123             Medications    Did you have newly prescribed medications at discharge? Yes      Did you understand the reasons for your medications at discharge and how to take them? Yes      Did you understand the side effects of your medications? Yes      Are you taking all of you prescribed medications? Yes      What pharmacy was used to fill prescription(s)? [facility]      Were medications picked up? Yes        Row Name 06/07/24 1123             Discharge Instructions    Did you understand your discharge instructions? No  not reviewed with patient prior to going to SNF      Did your family/caregiver hear your instructions? No      Were you told to eat a special diet? No      Did you adhere to the diet? --  patient states she was too sick to eat while at facility      Were you given a number of someone to call if you had questions or concerns? No        Row Name 06/07/24 1123              Index discharge location/services    Where did you go upon discharge? Skilled Nursing Facility      Do you have supportive family or friends in the home? No      What services were arranged at discharge? --  none      Was the provider seen at the facility? Yes  per patient      What actions were taken to avoid a readmit? Awaiting response from facility      Which Skilled [facility]        Row Name 06/07/24 1123             Discharge Readiness    On a scale of 1-5 (5 being well prepared), how ready were you for discharge 3        Row Name 06/07/24 1123             Palliative Care/Hospice    Are you current with Palliative Care? No      Are you current with Hospice Care? No        Row Name 06/05/24 2100             Advance Directives (For Healthcare)    Pre-existing AND/MOST/POLST Order No      Advance Directive Status Patient has advance directive, copy in chart      Type of Advance Directive Health care directive/Living will      Have you reviewed your Advance Directive and is it valid for this stay? Yes      Literature Provided on Advance Directives No      Patient Requests Assistance on Advance Directives Patient Declined        Row Name 06/07/24 1123             Readmission Assessment Final Comments    Final Comments Previous: Here for CHF exacerbation. Patient treated with IV diuretics and then transitioned back to oral Regimen. Patient also had UTI and was treated with ABX during hosptial stay, but not continued at DC. Sent home with new Jardiance and Metolazone. Patient Immunosupressed and susceptible to infection.  Current stay - Sepsis w/UTI. Patient tested positive for Cdiff as well. Patient having fever, nausea, and vomiting at facility prior to arrival. Currently receiving IV abx for treatment. Will request CHF clinic, Pulmonary Clinic, and Palliaitive consults to be placed if MD agreeable secondary to CHF, COPD, and other comorbidities.                       Met with patient in room wearing PPE:  gloves, gown.      Maintain distance greater than six feet and spent less than fifteen minutes in the room.    Karina Teresa, GHASSAN    Office Phone: [phone]  Office Cell:     [phone]

## 2024-06-07 NOTE — CONSULTS
Infectious Diseases Consult Note    Referring Provider: Juanis Lowe MD    Reason for Consultation: C. difficile colitis and bacteremia    Patient Care Team:  Jonathan Luna APRN as PCP - General (Family Medicine)  Jak Gavin MD as Cardiologist (Cardiology)    Chief complaint fever on admission    Subjective     History of present illness:      This is 79-year-old female who was hospitalized [facility] on June 5, 2024 with a complains of fever and weakness.  The patient was found to be bacteremic and blood cultures are growing ESBL  E. coli.  The same organism was cultured in the urine.  The patient was treated recently for ESBL  E. coli causing UTI but patient was not bacteremic and was given 1 dose of p.o. fosfomycin.  Patient was also diagnosed with C. difficile colitis this admission after she presented with diarrhea.  Currently on p.o. vancomycin.  Patient is hemodynamically stable requiring no vasopressors.    Review of Systems   Review of Systems   Constitutional: Negative.  Positive for fever.   HENT: Negative.     Eyes: Negative.    Respiratory: Negative.     Cardiovascular: Negative.    Gastrointestinal: Negative.  Positive for abdominal pain and diarrhea.   Genitourinary: Negative.    Musculoskeletal: Negative.    Skin: Negative.    Neurological: Negative.    Hematological: Negative.    Psychiatric/Behavioral: Negative.         Medications  Medications Prior to Admission   Medication Sig Dispense Refill Last Dose    acetaminophen (Tylenol) 325 MG tablet Take 2 tablets by mouth Every 4 (Four) Hours As Needed for Fever or Mild Pain.   6/5/2024    apixaban (ELIQUIS) 5 MG tablet tablet Take 1 tablet by mouth Every 12 (Twelve) Hours. 60 tablet 0 6/5/2024    colestipol (COLESTID) 1 g tablet Take 1 tablet by mouth Daily.   6/5/2024    diazePAM (VALIUM) 5 MG tablet Take 1 tablet by mouth 2 (Two) Times a Day As Needed for Anxiety.   6/4/2024    Diclofenac Sodium (Aspercreme Arthritis  Pain) 1 % gel gel Apply 4 g topically to the appropriate area as directed 2 (Two) Times a Day As Needed (pain).   6/5/2024    dilTIAZem (CARDIZEM) 90 MG tablet Take 1 tablet by mouth Every 8 (Eight) Hours. 60 tablet 0 6/5/2024    DULoxetine HCl 40 MG capsule delayed-release particles Take 1 capsule by mouth Daily.   6/5/2024    empagliflozin (JARDIANCE) 25 MG tablet tablet Take 1 tablet by mouth Daily. 30 tablet 0 6/5/2024    ferrous sulfate 324 (65 Fe) MG tablet delayed-release EC tablet Take 1 tablet by mouth Daily With Breakfast. 30 tablet 0 6/5/2024    furosemide (LASIX) 80 MG tablet Take 1 tablet by mouth 2 (Two) Times a Day. 60 tablet 0 6/5/2024    levothyroxine (SYNTHROID, LEVOTHROID) 50 MCG tablet Take 1 tablet by mouth Daily.   6/5/2024    metOLazone (ZAROXOLYN) 5 MG tablet Take 1 tablet by mouth 3 (Three) Times a Week. Monday, Wednesday, Friday 6/5/2024    metoprolol succinate XL (TOPROL-XL) 50 MG 24 hr tablet Take 1 tablet by mouth Daily.   6/5/2024    potassium chloride (KLOR-CON M20) 20 MEQ CR tablet Take 2 tablets by mouth Daily. 60 tablet 0 6/5/2024    predniSONE (DELTASONE) 5 MG tablet Take 1 tablet by mouth Daily.   6/5/2024    tacrolimus (PROGRAF) 1 MG capsule Take 1 capsule by mouth 2 (Two) Times a Day.   6/5/2024    albuterol sulfate  (90 Base) MCG/ACT inhaler Inhale 2 puffs Every 6 (Six) Hours As Needed for Wheezing.       Carboxymethylcellulose Sodium (DRY EYE RELIEF OP) Apply 2 drops to eye(s) as directed by provider 2 (Two) Times a Day As Needed (dry eyes).       GUAIFENESIN 1200 PO Take 1,200 mg by mouth Every 12 (Twelve) Hours As Needed (for cough).       loperamide (IMODIUM) 2 MG capsule Take 2 mg by mouth 4 (Four) Times a Day As Needed for Diarrhea.          History  Past Medical History:   Diagnosis Date    Allergic rhinitis 04/14/2015    Asthma 08/10/2017    Atrial flutter 05/11/2017    Chronic diarrhea 04/15/2019    Chronic hypoxemic respiratory failure 01/18/2024    Chronic  pain 02/03/2015    COPD (chronic obstructive pulmonary disease)     COVID-19 virus detected 08/11/2022    Cytokine release syndrome, grade 1 08/16/2022    Edema of both lower extremities due to peripheral venous insufficiency 03/12/2021    ESRD on hemodialysis 05/22/2013    Essential (primary) hypertension 03/03/2022    Fracture of ulnar styloid 05/19/2022    Fracture of unspecified carpal bone, right wrist, subsequent encounter for fracture with routine healing 03/03/2022    Gastroesophageal reflux disease 11/12/2020    Gout 08/10/2017    Hearing loss 08/10/2017    History of appendectomy     History of DVT (deep vein thrombosis) 11/12/2020    History of kidney transplant 06/30/2017    History of lobectomy of lung 01/18/2024 03/08/2016:  RIGHT Lower Lobe Mass--> Right Video-assisted thoracoscopy with a moderate-to-large wedge resection of the RLL (by Dr. Haris Mcconnell @ [facility])--> Poorly differentiated carcinoma of the RLL.      History of repair of hip joint 05/21/2013    History of suicide attempt 05/20/2024    Hyperlipidemia 08/11/2014    Hypothyroidism, unspecified 03/03/2022    Infection due to extended spectrum beta lactamase (ESBL) producing bacteria 03/11/2016    No A2K system hx. +ESBL E coli urine on 3/11/16.      Irritable bowel syndrome 04/15/2019    Long term (current) use of immunosuppressive biologic 04/28/2021    Long term current use of anticoagulant therapy 01/18/2024    Malignant neoplasm of lung 08/10/2017    Menopausal flushing 08/30/2021    Mitral valve regurgitation 07/06/2015    Mixed anxiety and depressive disorder 04/30/2015    Non-small cell lung cancer 08/10/2017    Nonobstructive atherosclerosis of coronary artery 06/02/2019 08/12/2022: CATH: [facility]: NSTEMI assoc with Covid-19: LM:-nl;  LAD: diffuse, Ca++; 30%; CIRC: Dominant. Normal; RCA: small; 50% diffuse, proximal and mid-segment.      NSTEMI (non-ST elevated myocardial infarction) 08/11/2022     Obsessive-compulsive disorder 04/15/2019    Osteoarthritis 05/20/2024    Paroxysmal atrial fibrillation 05/11/2017    Paroxysmal supraventricular tachycardia 05/11/2017    Peripheral neuropathy 08/11/2014    Personal history of peptic ulcer disease 11/12/2020    Postoperative anemia due to acute blood loss 05/22/2013    Right wrist fracture 03/01/2022    Seasonal allergies 08/10/2017    Secondary hyperparathyroidism of renal origin 09/14/2015    Tricuspid valve regurgitation 03/15/2017    Ulcer of lower extremity 08/30/2021    Unspecified acute appendicitis 03/03/2022    Valvular heart disease 05/20/2024    Wegener's granulomatosis with renal involvement 03/03/2022     Past Surgical History:   Procedure Laterality Date    APPENDECTOMY      BREAST SURGERY Left     cysts rmeoved    CARDIAC CATHETERIZATION Right 08/12/2022    Procedure: Left Heart Cath and coronary angiogram;  Surgeon: Jak Gavni MD;  Location: [facility] CATH INVASIVE LOCATION;  Service: Cardiology;  Laterality: Right;    CLOSED REDUCTION WRIST FRACTURE Right 03/01/2022    Procedure: WRIST CLOSED REDUCTION;  Surgeon: Gaudencio Townsend MD;  Location: [facility] MAIN OR;  Service: Orthopedics;  Laterality: Right;    CYSTOSCOPY      HIP ARTHROPLASTY      HYSTERECTOMY      LUNG LOBECTOMY Right     TRANSPLANTATION RENAL         Family History  Family History   Problem Relation Age of Onset    No Known Problems Mother     No Known Problems Father        Social History   reports that she has quit smoking. She has never been exposed to tobacco smoke. She has never used smokeless tobacco. She reports that she does not drink alcohol and does not use drugs.    Allergies  Zolpidem    Objective     Vital Signs   Vital Signs (last 24 hours)         06/06 0700  06/07 0659 06/07 0700  06/07 1412   Most Recent      Temp (°F) 96.5 -  98.3    97.8 -  98     98 (36.7) 06/07 1159    Heart Rate 84 -  122       94 06/07 0530    Resp 30 -  34    26 -  32     26 06/07 1159    /51  -  144/80    119/69 -  123/65     119/69 06/07 1159    SpO2 (%) 86 -  98       96 06/07 0530    Flow (L/min)   4    2 -  4     2 06/07 1200            Physical Exam:  Physical Exam  Vitals and nursing note reviewed.   Constitutional:       Appearance: She is well-developed.   HENT:      Head: Normocephalic and atraumatic.   Eyes:      Pupils: Pupils are equal, round, and reactive to light.   Cardiovascular:      Rate and Rhythm: Normal rate and regular rhythm.      Heart sounds: Normal heart sounds.   Pulmonary:      Effort: Pulmonary effort is normal. No respiratory distress.      Breath sounds: Normal breath sounds. No wheezing or rales.   Abdominal:      General: Bowel sounds are normal. There is no distension.      Palpations: Abdomen is soft. There is no mass.      Tenderness: There is abdominal tenderness. There is no guarding or rebound.   Musculoskeletal:         General: No deformity. Normal range of motion.      Cervical back: Normal range of motion and neck supple.      Right lower leg: Edema present.      Left lower leg: Edema present.      Comments: Chronic venous stasis changes   Skin:     General: Skin is warm.      Findings: No erythema or rash.   Neurological:      Mental Status: She is alert and oriented to person, place, and time.      Cranial Nerves: No cranial nerve deficit.         Microbiology  Microbiology Results (last 10 days)       Procedure Component Value - Date/Time    Clostridioides difficile Toxin - Stool, Per Rectum [[id number]]  (Abnormal) Collected: 06/07/24 0438    Lab Status: Final result Specimen: Stool from Per Rectum Updated: 06/07/24 0701    Narrative:      The following orders were created for panel order Clostridioides difficile Toxin - Stool, Per Rectum.  Procedure                               Abnormality         Status                     ---------                               -----------         ------                     Clostridioides difficile...[[id number]]  Abnormal             Final result                 Please view results for these tests on the individual orders.    Clostridioides difficile EIA - Stool, Per Rectum [[id number]]  (Abnormal) Collected: 06/07/24 0438    Lab Status: Final result Specimen: Stool from Per Rectum Updated: 06/07/24 0701     C Diff GDH Ag Positive     C.diff Toxin Ag Positive    Narrative:      DNA from a toxigenic strain of C.difficile was detected, along with the presence of free toxin. These results are suggestive of C.difficile infection, in context of an appropriate clinical scenario.    CANDIDA AURIS PCR - Swab, Axilla Right, Axilla Left and Groin [[id number]]  (Normal) Collected: 06/06/24 0848    Lab Status: Final result Specimen: Swab from Axilla Right, Axilla Left and Groin Updated: 06/07/24 0948     CANDIDA AURIS PCR Not Detected    Gastrointestinal Panel, PCR - Stool, Per Rectum [[id number]]  (Normal) Collected: 06/05/24 1818    Lab Status: Final result Specimen: Stool from Per Rectum Updated: 06/05/24 2034     Campylobacter Not Detected     Plesiomonas shigelloides Not Detected     Salmonella Not Detected     Vibrio Not Detected     Vibrio cholerae Not Detected     Yersinia enterocolitica Not Detected     Enteroaggregative E. coli (EAEC) Not Detected     Enteropathogenic E. coli (EPEC) Not Detected     Enterotoxigenic E. coli (ETEC) lt/st Not Detected     Shiga-like toxin-producing E. coli (STEC) stx1/stx2 Not Detected     Shigella/Enteroinvasive E. coli (EIEC) Not Detected     Cryptosporidium Not Detected     Cyclospora cayetanensis Not Detected     Entamoeba histolytica Not Detected     Giardia lamblia Not Detected     Adenovirus F40/41 Not Detected     Astrovirus Not Detected     Norovirus GI/GII Not Detected     Rotavirus A Not Detected     Sapovirus (I, II, IV or V) Not Detected    Narrative:      If Aeromonas, Staphylococcus aureus or Bacillus cereus are suspected, please order [id number]: Stool Culture, Aeromonas, S aureus, B Cereus.     Respiratory Panel PCR w/COVID-19(SARS-CoV-2) HODAN/MARIA M/TWILA/PAD/COR/JENISE In-House, NP Swab in UTM/VTM, 2 HR TAT - Swab, Nasopharynx [[id number]]  (Normal) Collected: 06/05/24 1224    Lab Status: Final result Specimen: Swab from Nasopharynx Updated: 06/05/24 1326     ADENOVIRUS, PCR Not Detected     Coronavirus 229E Not Detected     Coronavirus HKU1 Not Detected     Coronavirus NL63 Not Detected     Coronavirus OC43 Not Detected     COVID19 Not Detected     Human Metapneumovirus Not Detected     Human Rhinovirus/Enterovirus Not Detected     Influenza A PCR Not Detected     Influenza B PCR Not Detected     Parainfluenza Virus 1 Not Detected     Parainfluenza Virus 2 Not Detected     Parainfluenza Virus 3 Not Detected     Parainfluenza Virus 4 Not Detected     RSV, PCR Not Detected     Bordetella pertussis pcr Not Detected     Bordetella parapertussis PCR Not Detected     Chlamydophila pneumoniae PCR Not Detected     Mycoplasma pneumo by PCR Not Detected    Narrative:      In the setting of a positive respiratory panel with a viral infection PLUS a negative procalcitonin without other underlying concern for bacterial infection, consider observing off antibiotics or discontinuation of antibiotics and continue supportive care. If the respiratory panel is positive for atypical bacterial infection (Bordetella pertussis, Chlamydophila pneumoniae, or Mycoplasma pneumoniae), consider antibiotic de-escalation to target atypical bacterial infection.    Urine Culture - Urine, Straight Cath [[id number]]  (Abnormal)  (Susceptibility) Collected: 06/05/24 1224    Lab Status: Final result Specimen: Urine from Straight Cath Updated: 06/07/24 1039     Urine Culture >100,000 CFU/mL Escherichia coli ESBL     Comment:   Consider infectious disease consult.  Susceptibility results may not correlate to clinical outcomes.       Narrative:      Colonization of the urinary tract without infection is common. Treatment is discouraged unless the  patient is symptomatic, pregnant, or undergoing an invasive urologic procedure.  Recent outcomes data supports the use of pip/tazo in the treatment of susceptible ESBL infections for uncomplicated UTI. Consider use of pip/tazo as a carbapenem-sparing regimen in applicable patients.    Susceptibility        Escherichia coli ESBL      ROBERT      Ertapenem Susceptible      Gentamicin Susceptible      Levofloxacin Resistant      Meropenem Susceptible      Nitrofurantoin Susceptible      Piperacillin + Tazobactam Susceptible      Trimethoprim + Sulfamethoxazole Resistant                           Blood Culture - Blood, Arm, Left [[id number]]  (Normal) Collected: 06/05/24 1223    Lab Status: Preliminary result Specimen: Blood from Arm, Left Updated: 06/07/24 1245     Blood Culture No growth at 2 days    Blood Culture - Blood, Arm, Left [[id number]]  (Abnormal) Collected: 06/05/24 1223    Lab Status: Preliminary result Specimen: Blood from Arm, Left Updated: 06/07/24 0609     Blood Culture Abnormal Stain     Gram Stain Aerobic Bottle Gram negative bacilli    Narrative:      Less than seven (7) mL's of blood was collected.  Insufficient quantity may yield false negative results.    Blood Culture ID, PCR - Blood, Arm, Left [[id number]]  (Abnormal) Collected: 06/05/24 1223    Lab Status: Final result Specimen: Blood from Arm, Left Updated: 06/07/24 0608     BCID, PCR Escherichia coli. Identification by BCID2 PCR.     BCID, PCR 2 CTX-M (ESBL) detected. Identification by BCID2 PCR     BOTTLE TYPE Aerobic Bottle            Laboratory  Results from last 7 days   Lab Units 06/07/24  0522   WBC 10*3/mm3 14.63*   HEMOGLOBIN g/dL 10.6*   HEMATOCRIT % 34.9   PLATELETS 10*3/mm3 199     Results from last 7 days   Lab Units 06/07/24  0522   SODIUM mmol/L 131*   POTASSIUM mmol/L 2.8*   CHLORIDE mmol/L 92*   CO2 mmol/L 28.8   BUN mg/dL 42*   CREATININE mg/dL 1.04*   GLUCOSE mg/dL 203*   CALCIUM mg/dL 9.4     Results from last 7 days   Lab Units  06/07/24  0522   SODIUM mmol/L 131*   POTASSIUM mmol/L 2.8*   CHLORIDE mmol/L 92*   CO2 mmol/L 28.8   BUN mg/dL 42*   CREATININE mg/dL 1.04*   GLUCOSE mg/dL 203*   CALCIUM mg/dL 9.4                   Radiology  Imaging Results (Last 72 Hours)       Procedure Component Value Units Date/Time    XR Chest 1 View [[id number]] Collected: 06/05/24 1244     Updated: 06/05/24 1248    Narrative:      XR CHEST 1 VW    Date of Exam: 6/5/2024 12:43 PM EDT    Indication: cough, soa    Comparison: AP portable chest 5/31/2024. CT chest 5/19/2024    Findings:  Advanced emphysematous changes are seen to better advantage on prior CT chest imaging. There is mild posterior medial left basilar airspace disease favored to represent atelectasis. Left lower lobe aeration appears improved since 5/31/2024. Trace right   basilar pleural effusion is unchanged. No significant left pleural effusion is identified. Benign calcified nodule in the left upper lobe. Heart size is enlarged but stable. No acute osseous abnormality      Impression:      Impression:    1. Improved aeration in the left lower lobe since 5/31/2024. Mild medial left basilar airspace disease remains, favored to represent atelectasis. Mild residual left basilar pneumonia not excluded  2. Stable small right pleural effusion. Left pleural effusion appears to have resolved.  3. Stable cardiomegaly. No overt features of edema.      Electronically Signed: Karie Bustos MD    6/5/2024 12:46 PM EDT    Workstation ID: [id number]    CT Abdomen Pelvis Without Contrast [[id number]] Collected: 06/05/24 1238     Updated: 06/05/24 1245    Narrative:      CT ABDOMEN PELVIS WO CONTRAST    Date of Exam: 6/5/2024 12:31 PM EDT    Indication: vomiting, fever.    Comparison: 5/21/2024    Technique: Axial CT images were obtained of the abdomen and pelvis without the administration of contrast. Sagittal and coronal reconstructions were performed.  Automated exposure control and iterative reconstruction  methods were used.      Findings:  LUNG BASES: Small left pleural effusion with left basal atelectasis, unchanged. Lower lung interstitial coarsening is similar.    LIVER:  Unremarkable parenchyma without focal lesion.  BILIARY/GALLBLADDER: There are calcified gallstones. There are no gallbladder inflammatory changes.  SPLEEN:  Unremarkable  PANCREAS:  Unremarkable  ADRENAL: Stable left adrenal nodule dating back to 2011.  KIDNEYS: Advanced bilateral native renal atrophy with no solid mass identified. There is a right lower quadrant transplant kidney. No obstruction.  There are bilateral native renal calcifications likely combination of vascular and collecting system   calculi.  GASTROINTESTINAL/MESENTERY:  No evidence of obstruction nor inflammation.    AORTA/IVC:  Normal caliber.    RETROPERITONEUM/LYMPH NODES:  Unremarkable    REPRODUCTIVE:  Unremarkable  BLADDER:  Unremarkable    OSSEUS STRUCTURES: There are bilateral total hip arthroplasties with associated streak and beam hardening artifact.      Impression:      Impression:  1.No acute process nor significant change identified.            Electronically Signed: Mark Kiran MD    6/5/2024 12:43 PM EDT    Workstation ID: [id number]            Cardiology      Results Review:  I have reviewed all clinical data, test, lab, and imaging results.       Schedule Meds  apixaban, 5 mg, Oral, Q12H  dilTIAZem, 30 mg, Oral, Q8H  DULoxetine, 40 mg, Oral, Daily  famotidine, 20 mg, Intravenous, Daily  ferrous sulfate, 324 mg, Oral, Daily With Breakfast  levothyroxine, 50 mcg, Oral, Q AM  meropenem, 1,000 mg, Intravenous, Q8H  metoprolol succinate XL, 50 mg, Oral, Daily  potassium & sodium phosphates, 2 packet, Oral, BID AC  predniSONE, 5 mg, Oral, Daily  sodium chloride, 10 mL, Intravenous, Q12H  tacrolimus, 1 mg, Oral, BID  vancomycin, 250 mg, Oral, Q6H        Infusion Meds  dilTIAZem, 5-15 mg/hr, Last Rate: Stopped (06/07/24 0849)  Pharmacy Consult,         PRN Meds     acetaminophen    Calcium Replacement - Follow Nurse / BPA Driven Protocol    carboxymethylcellulose sod    diazePAM    loperamide    Magnesium Standard Dose Replacement - Follow Nurse / BPA Driven Protocol    melatonin    ondansetron    Pharmacy Consult    Phosphorus Replacement - Follow Nurse / BPA Driven Protocol    Potassium Replacement - Follow Nurse / BPA Driven Protocol    [COMPLETED] Insert Peripheral IV **AND** sodium chloride    sodium chloride    sodium chloride      Assessment & Plan       Assessment    E. coli bacteremia secondary to complicated UTI.  CT scan of abdomen pelvis showed no acute findings.  The organism is probably ESBL  based on the BC ID    Complicated UTI with bacteremia.  Urine culture grew ESBL  E. coli.  Patient had recent episode of E. coli/ESBL  UTI and treated with p.o. fosfomycin    C. difficile colitis.  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  Currently on prednisone and Prograf    History of lung cancer in the past    Plan    Continue IV meropenem 1 g every 8 hours for total of 14 days  Continue p.o. vancomycin but increase dose to 250 mg every 6 hours.  We will consider 3 weeks of treatment with p.o. vancomycin.  Continue supportive care  A.m. labs  Enteric isolation for C. difficile colitis  Contact isolation for ESBL  E. Coli  Appropriate PPE was placed on before entering the room        Dirk Robin MD  06/07/24  14:12 EDT    Note is dictated utilizing voice recognition software/Dragon

## 2024-06-07 NOTE — PROGRESS NOTES
"Enter Query Response Below      Query Response: CKD2    Naun Falcon MD             If applicable, please update the problem list.   Patient: Jaja Carcamo        : 1945  Account: [id number]           Admit Date: 2024        How to Respond to this query:       a. Click New Note     b. Answer query within the yellow box.                c. Update the Problem List, if applicable.      If you have any questions about this query contact me at: [email or web address]     :    79-year-old patient admitted 2024 with acute on chronic diastolic CHF has a history of ESRD, hemodialysis, and kidney transplant. Patient also had UTI and TONYA this admit. Discharge Summary states \"ESRD\". Nephrology PNs include \"ESRD - s/p DDKT\".  Creatinine and eGFR this admit as follows:   24 12:10-Creatinine: 1.16; eGFR: 48.7   24 04:43-Creatinine: 1.16; eGFR: 48.7  24 04:04-Creatinine: 1.10; eGFR: 51.9   24 05:26-Creatinine: 0.89; eGFR: 66.9  24 04:50-Creatinine: 0.84; eGFR: 71.7  24 04:46-Creatinine: 0.76; eGFR: 80.8  24 05:03-Creatinine: 0.75; eGFR: 82.1  24 05:38-Creatinine: 0.99; eGFR: 58.8   24 07:50-Creatinine: 1.06; eGFR: 54.2   24 01:30-Creatinine: 1.00; eGFR: 58.1   24 09:29-Creatinine: 0.76; eGFR: 80.8  Treatment has included Nephrology consult, monitoring labs, intake and output, Lasix, Diamox, Jardiance, Zaroxolyn, IV antibiotics, and continuing home Prograf.    Please clarify the stage of the patients CKD.    CKD stage 2; history of ESRD  ESRD  CKD other stage: _________  Other, please specify: ___________  Unable to clinically determine      KDIGO CKD staging [email or web address]  CKD Stage 1        GFR > 90  CKD Stage 2        GFR 60-89  CKD Stage 3a      GFR 45-59  CKD Stage 3b      GFR 30-44  CKD Stage 4        GFR 15-29  CKD Stage 5       GFR <15    By submitting this query, we are merely seeking further clarification of documentation to " accurately reflect all conditions that you are monitoring, evaluating, treating or that extend the hospitalization or utilize additional resources of care. Please utilize your independent clinical judgment when addressing the question(s) above.     This query and your response, once completed, will be entered into the legal medical record.    Sincerely,  Janene Dan RN, BSN, CCDS  Clinical Documentation Integrity Program

## 2024-06-07 NOTE — CASE MANAGEMENT/SOCIAL WORK
Case Management Readmission Assessment Note    UPDATED     Case Management Readmission Assessment (all recorded)       Readmission Interview       Row Name 06/07/24 1123             Readmission Indications    Is the patient and/or family able to complete the readmission assessment questions? Yes      Is this hospitalization related to the prior hospital diagnosis? No  Previously here for CHF exacerbation, Septic this admission        Row Name 06/07/24 1123             Recommendation for rehospitalization    Did you speak with your physician prior to coming to the hospital Yes      If yes, what physician did you speak with? Facility practitioner        Row Name 06/07/24 1123             Follow-up Appointments    Do you have a PCP? Yes  Dr. Jonathan Luna      Did you have an appointment with PCP after your hospitalization? No  Only at Rehab for two days before returning.      Did you have an appointment with a Specialist? No      Are you current with the Pulmonary Clinic? No      Are you current with the CHF Clinic? No        Row Name 06/07/24 1123             Medications    Did you have newly prescribed medications at discharge? Yes      Did you understand the reasons for your medications at discharge and how to take them? Yes      Did you understand the side effects of your medications? Yes      Are you taking all of you prescribed medications? Yes      What pharmacy was used to fill prescription(s)? [facility]      Were medications picked up? Yes        Row Name 06/07/24 1123             Discharge Instructions    Did you understand your discharge instructions? No  not reviewed with patient prior to going to SNF      Did your family/caregiver hear your instructions? No      Were you told to eat a special diet? No      Did you adhere to the diet? --  patient states she was too sick to eat while at facility      Were you given a number of someone to call if you had questions or concerns? No        Row Name  06/07/24 1303 06/07/24 1123          Index discharge location/services    Where did you go upon discharge? -- Skilled Nursing Facility     Do you have supportive family or friends in the home? -- No     What services were arranged at discharge? -- --  none     Was the provider seen at the facility? -- Yes  per patient     What actions were taken to avoid a readmit? Liaison responds that facility increased patient oxygen delivery with no change in symptoms and then patient requested to be evaluated in the ER. Awaiting response from facility     Which Skilled Nursing [facility] were your [facility] from? -- [facility]       Row Name 06/07/24 1123             Discharge Readiness    On a scale of 1-5 (5 being well prepared), how ready were you for discharge 3        Row Name 06/07/24 1123             Palliative Care/Hospice    Are you current with Palliative Care? No      Are you current with Hospice Care? No        Row Name 06/05/24 2100             Advance Directives (For Healthcare)    Pre-existing AND/MOST/POLST Order No      Advance Directive Status Patient has advance directive, copy in chart      Type of Advance Directive Health care directive/Living will      Have you reviewed your Advance Directive and is it valid for this stay? Yes      Literature Provided on Advance Directives No      Patient Requests Assistance on Advance Directives Patient Declined        Row Name 06/07/24 1123             Readmission Assessment Final Comments    Final Comments Previous: Here for CHF exacerbation. Patient treated with IV diuretics and then transitioned back to oral Regimen. Patient also had UTI and was treated with ABX during hosptial stay, but not continued at DC. Sent home with new Jardiance and Metolazone. Patient Immunosupressed and susceptible to infection.  Current stay - Sepsis w/UTI. Patient tested positive for Cdiff as well. Patient having fever, nausea, and vomiting at facility prior to arrival. Currently receiving  IV abx for treatment. Will request CHF clinic, Pulmonary Clinic, and Palliaitive consults to be placed if MD agreeable secondary to CHF, COPD, and other comorbidities.                      Phone communication or documentation only- no physical contact with patient or family.    Karina Teresa RN    Office Phone: [phone]  Office Cell:     [phone]

## 2024-06-07 NOTE — PLAN OF CARE
Goal Outcome Evaluation:      Pt rested well throughout the night. Around 0200, pt unable to fall back asleep, prn melatonin given. Around 0400, pt still unable to fall asleep and anxious, requested prn valium and given. Blood cultures positive for ESBL. Pt having frequent liquid bowel movements; c.diff sample obtained and pt placed in contact spore isolation. VSS. Denies pain/discomfort at this time.

## 2024-06-07 NOTE — PROGRESS NOTES
LOS: 1 day   Patient Care Team:  Jonathan Luna APRN as PCP - General (Family Medicine)  Jak Gavin MD as Cardiologist (Cardiology)    Subjective     Interval History: Frequent diarrhea developed last night, positive for C. difficile    Patient Complaints: Diarrhea, generalized weakness, malaise    History taken from: patient    Review of Systems   Constitutional:  Positive for activity change, appetite change and fatigue. Negative for chills, diaphoresis and fever.   HENT:  Negative for facial swelling.    Eyes:  Negative for visual disturbance.   Respiratory:  Negative for cough, shortness of breath, wheezing and stridor.    Cardiovascular:  Negative for chest pain, palpitations and leg swelling.   Gastrointestinal:  Positive for abdominal pain, diarrhea and nausea. Negative for vomiting.   Endocrine: Negative for polyuria.   Genitourinary:  Negative for dysuria.   Musculoskeletal:  Positive for arthralgias, back pain and gait problem.   Neurological:  Positive for weakness. Negative for dizziness, light-headedness and headaches.   Psychiatric/Behavioral:  Negative for confusion.            Objective     Vital Signs  Temp:  [96.5 °F (35.8 °C)-98.3 °F (36.8 °C)] 97.8 °F (36.6 °C)  Heart Rate:  [] 94  Resp:  [30-32] 32  BP: (105-144)/(51-84) 123/65    Physical Exam:     General Appearance:    Alert, cooperative, in no acute distress, malaised   Head:    Normocephalic, without obvious abnormality, atraumatic   Eyes:            Lids and lashes normal, conjunctivae and sclerae normal, no   icterus, no pallor, corneas clear, PERRLA   Ears:    Ears appear intact with no abnormalities noted   Throat:   No oral lesions, no thrush, oral mucosa moist   Neck:   No adenopathy, supple, trachea midline, no thyromegaly, no   carotid bruit, no JVD   Lungs:     Clear to auscultation,respirations regular, even and                  unlabored    Heart:    Regular rhythm and normal rate, normal S1 and S2, no             murmur, no gallop, no rub, no click   Chest Wall:    No abnormalities observed   Abdomen:   Mild diffuse tenderness, hyperactive bowel sounds, no rebound or guarding   Extremities:   Moves all extremities well, no edema, no cyanosis, no             Redness   Pulses:   Pulses palpable and equal bilaterally   Skin: Chronic venous stasis changes bilaterally   Lymph nodes:   No palpable adenopathy   Neurologic:   Cranial nerves 2 - 12 grossly intact, sensation intact, DTR       present and equal bilaterally        Results Review:    Lab Results (last 24 hours)       Procedure Component Value Units Date/Time    CANDIDA AURIS PCR - Swab, Axilla Right, Axilla Left and Groin [[id number]]  (Normal) Collected: 06/06/24 0848    Specimen: Swab from Axilla Right, Axilla Left and Groin Updated: 06/07/24 0948     CANDIDA AURIS PCR Not Detected    Clostridioides difficile Toxin - Stool, Per Rectum [[id number]]  (Abnormal) Collected: 06/07/24 0438    Specimen: Stool from Per Rectum Updated: 06/07/24 0701    Narrative:      The following orders were created for panel order Clostridioides difficile Toxin - Stool, Per Rectum.  Procedure                               Abnormality         Status                     ---------                               -----------         ------                     Clostridioides difficile...[[id number]]  Abnormal            Final result                 Please view results for these tests on the individual orders.    Clostridioides difficile EIA - Stool, Per Rectum [[id number]]  (Abnormal) Collected: 06/07/24 0438    Specimen: Stool from Per Rectum Updated: 06/07/24 0701     C Diff GDH Ag Positive     C.diff Toxin Ag Positive    Narrative:      DNA from a toxigenic strain of C.difficile was detected, along with the presence of free toxin. These results are suggestive of C.difficile infection, in context of an appropriate clinical scenario.    Blood Culture - Blood, Arm, Left [[id number]]  (Abnormal)  Collected: 06/05/24 1223    Specimen: Blood from Arm, Left Updated: 06/07/24 0609     Blood Culture Abnormal Stain     Gram Stain Aerobic Bottle Gram negative bacilli    Narrative:      Less than seven (7) mL's of blood was collected.  Insufficient quantity may yield false negative results.    Basic Metabolic Panel [[id number]]  (Abnormal) Collected: 06/07/24 0522    Specimen: Blood Updated: 06/07/24 0608     Glucose 203 mg/dL      BUN 42 mg/dL      Creatinine 1.04 mg/dL      Sodium 131 mmol/L      Potassium 2.8 mmol/L      Chloride 92 mmol/L      CO2 28.8 mmol/L      Calcium 9.4 mg/dL      BUN/Creatinine Ratio 40.4     Anion Gap 10.2 mmol/L      eGFR 55.5 mL/min/1.73     Narrative:      GFR Normal >60  Chronic Kidney Disease <60  Kidney Failure <15    The GFR formula is only valid for adults with stable renal function between ages 18 and 70.    Magnesium [[id number]]  (Normal) Collected: 06/07/24 0522    Specimen: Blood Updated: 06/07/24 0608     Magnesium 1.9 mg/dL     Phosphorus [[id number]]  (Abnormal) Collected: 06/07/24 0522    Specimen: Blood Updated: 06/07/24 0608     Phosphorus 2.2 mg/dL     Blood Culture ID, PCR - Blood, Arm, Left [[id number]]  (Abnormal) Collected: 06/05/24 1223    Specimen: Blood from Arm, Left Updated: 06/07/24 0608     BCID, PCR Escherichia coli. Identification by BCID2 PCR.     BCID, PCR 2 CTX-M (ESBL) detected. Identification by BCID2 PCR     BOTTLE TYPE Aerobic Bottle    STAT Lactic Acid, Reflex [[id number]]  (Normal) Collected: 06/07/24 0522    Specimen: Blood Updated: 06/07/24 0605     Lactate 1.8 mmol/L     CBC & Differential [[id number]]  (Abnormal) Collected: 06/07/24 0522    Specimen: Blood Updated: 06/07/24 0547    Narrative:      The following orders were created for panel order CBC & Differential.  Procedure                               Abnormality         Status                     ---------                               -----------         ------                     CBC Auto  Differential[[id number]]        Abnormal            Final result                 Please view results for these tests on the individual orders.    CBC Auto Differential [[id number]]  (Abnormal) Collected: 06/07/24 0522    Specimen: Blood Updated: 06/07/24 0547     WBC 14.63 10*3/mm3      RBC 3.71 10*6/mm3      Hemoglobin 10.6 g/dL      Hematocrit 34.9 %      MCV 94.1 fL      MCH 28.6 pg      MCHC 30.4 g/dL      RDW 16.3 %      RDW-SD 56.5 fl      MPV 10.8 fL      Platelets 199 10*3/mm3      Neutrophil % 86.1 %      Lymphocyte % 3.4 %      Monocyte % 8.9 %      Eosinophil % 0.8 %      Basophil % 0.3 %      Immature Grans % 0.5 %      Neutrophils, Absolute 12.60 10*3/mm3      Lymphocytes, Absolute 0.50 10*3/mm3      Monocytes, Absolute 1.30 10*3/mm3      Eosinophils, Absolute 0.12 10*3/mm3      Basophils, Absolute 0.04 10*3/mm3      Immature Grans, Absolute 0.07 10*3/mm3      nRBC 0.1 /100 WBC     Tacrolimus Level [[id number]] Collected: 06/07/24 0522    Specimen: Blood Updated: 06/07/24 0530    Urine Culture - Urine, Straight Cath [[id number]]  (Abnormal) Collected: 06/05/24 1224    Specimen: Urine from Straight Cath Updated: 06/06/24 1322     Urine Culture >100,000 CFU/mL Escherichia coli    Narrative:      Colonization of the urinary tract without infection is common. Treatment is discouraged unless the patient is symptomatic, pregnant, or undergoing an invasive urologic procedure.    Blood Culture - Blood, Arm, Left [[id number]]  (Normal) Collected: 06/05/24 1223    Specimen: Blood from Arm, Left Updated: 06/06/24 1245     Blood Culture No growth at 24 hours             Imaging Results (Last 24 Hours)       ** No results found for the last 24 hours. **                 I reviewed the patient's new clinical results.    Medication Review:   Scheduled Meds:apixaban, 5 mg, Oral, Q12H  famotidine, 20 mg, Intravenous, Daily  ferrous sulfate, 324 mg, Oral, Daily With Breakfast  levothyroxine, 50 mcg, Oral, Q AM  meropenem,  1,000 mg, Intravenous, Q8H  metoprolol succinate XL, 50 mg, Oral, Daily  potassium & sodium phosphates, 2 packet, Oral, BID AC  potassium chloride, 10 mEq, Intravenous, Q1H  predniSONE, 5 mg, Oral, Daily  sodium chloride, 10 mL, Intravenous, Q12H  tacrolimus, 1 mg, Oral, BID  vancomycin, 125 mg, Oral, Q6H      Continuous Infusions:dilTIAZem, 5-15 mg/hr, Last Rate: Stopped (06/07/24 0849)  Pharmacy Consult,       PRN Meds:.  acetaminophen    Calcium Replacement - Follow Nurse / BPA Driven Protocol    diazePAM    loperamide    Magnesium Standard Dose Replacement - Follow Nurse / BPA Driven Protocol    melatonin    ondansetron    Pharmacy Consult    Phosphorus Replacement - Follow Nurse / BPA Driven Protocol    Potassium Replacement - Follow Nurse / BPA Driven Protocol    [COMPLETED] Insert Peripheral IV **AND** sodium chloride    sodium chloride    sodium chloride     Assessment & Plan       UTI (urinary tract infection)  -Urine and blood cultures now show E. coli; blood PCR suggest ESBL.  Antibiotics changed to meropenem.  Consulted ID.  White blood cell count is trending down and lactic acidosis has resolved.    C. difficile colitis-starting oral vancomycin  History of renal transplant-tacrolimus level is pending  Hypokalemia-replacing  Hypophosphatemia-replacing  Hypothyroidism-levothyroxine  Immunocompromised  Acute kidney injury-creatinine trending down towards baseline.  Diuretics are on hold.  Paroxysmal atrial fibs-currently in sinus rhythm; anticoagulated with apixaban.  Continue metoprolol      Plan for disposition: Return to SNF when able    Juanis Lowe MD  06/07/24  10:28 EDT

## 2024-06-07 NOTE — PROGRESS NOTES
"RENAL/KCC:     LOS: 1 day    Patient Care Team:  Jonathan Luna APRN as PCP - General (Family Medicine)  Jak Gavin MD as Cardiologist (Cardiology)    Chief Complaint:  Fever    Subjective     Interval History:   Chart reviewed  Feeling some better  Off Dilt gtt  Tolerating some PO    Objective     Vital Sign Min/Max for last 24 hours  Temp  Min: 96.5 °F (35.8 °C)  Max: 98.3 °F (36.8 °C)   BP  Min: 105/51  Max: 144/69   Pulse  Min: 84  Max: 109   Resp  Min: 30  Max: 32   SpO2  Min: 86 %  Max: 98 %   Flow (L/min)  Min: 4  Max: 4   No data recorded     Flowsheet Rows      Flowsheet Row First Filed Value   Admission Height 167.6 cm (66\") Documented at 06/05/2024 1152   Admission Weight 71.3 kg (157 lb 3 oz) Documented at 06/05/2024 1152            No intake/output data recorded.  I/O last 3 completed shifts:  In: 1037 [P.O.:240; I.V.:797]  Out: -     Physical Exam:  GEN: Awake, NAD  ENT: PERRL, EOMI, MMM  NECK: Supple, no JVD  CHEST: CTAB, no W/R/C  CV: RRR, no M/G/R  ABD: Soft, NT, +BS  SKIN: Warm and Dry  NEURO: CN's intact      WBC WBC   Date Value Ref Range Status   06/07/2024 14.63 (H) 3.40 - 10.80 10*3/mm3 Final   06/06/2024 19.84 (H) 3.40 - 10.80 10*3/mm3 Final   06/05/2024 23.12 (H) 3.40 - 10.80 10*3/mm3 Final      HGB Hemoglobin   Date Value Ref Range Status   06/07/2024 10.6 (L) 12.0 - 15.9 g/dL Final   06/06/2024 13.5 12.0 - 15.9 g/dL Final   06/05/2024 13.4 12.0 - 15.9 g/dL Final      HCT Hematocrit   Date Value Ref Range Status   06/07/2024 34.9 34.0 - 46.6 % Final   06/06/2024 43.6 34.0 - 46.6 % Final   06/05/2024 43.5 34.0 - 46.6 % Final      Platlets No results found for: \"LABPLAT\"   MCV MCV   Date Value Ref Range Status   06/07/2024 94.1 79.0 - 97.0 fL Final   06/06/2024 93.6 79.0 - 97.0 fL Final   06/05/2024 93.3 79.0 - 97.0 fL Final          Sodium Sodium   Date Value Ref Range Status   06/07/2024 131 (L) 136 - 145 mmol/L Final   06/06/2024 139 136 - 145 mmol/L Final   06/05/2024 134 (L) 136 - " "145 mmol/L Final      Potassium Potassium   Date Value Ref Range Status   06/07/2024 2.8 (L) 3.5 - 5.2 mmol/L Final   06/06/2024 3.2 (L) 3.5 - 5.2 mmol/L Final   06/05/2024 3.1 (L) 3.5 - 5.2 mmol/L Final      Chloride Chloride   Date Value Ref Range Status   06/07/2024 92 (L) 98 - 107 mmol/L Final   06/06/2024 93 (L) 98 - 107 mmol/L Final   06/05/2024 87 (L) 98 - 107 mmol/L Final      CO2 CO2   Date Value Ref Range Status   06/07/2024 28.8 22.0 - 29.0 mmol/L Final   06/06/2024 29.6 (H) 22.0 - 29.0 mmol/L Final   06/05/2024 28.9 22.0 - 29.0 mmol/L Final      BUN BUN   Date Value Ref Range Status   06/07/2024 42 (H) 8 - 23 mg/dL Final   06/06/2024 47 (H) 8 - 23 mg/dL Final   06/05/2024 50 (H) 8 - 23 mg/dL Final     Comment:     Result checked        Creatinine Creatinine   Date Value Ref Range Status   06/07/2024 1.04 (H) 0.57 - 1.00 mg/dL Final   06/06/2024 1.16 (H) 0.57 - 1.00 mg/dL Final   06/05/2024 1.45 (H) 0.57 - 1.00 mg/dL Final     Comment:     Result checked        Calcium Calcium   Date Value Ref Range Status   06/07/2024 9.4 8.6 - 10.5 mg/dL Final   06/06/2024 10.2 8.6 - 10.5 mg/dL Final   06/05/2024 10.3 8.6 - 10.5 mg/dL Final      PO4 No results found for: \"CAPO4\"   Albumin Albumin   Date Value Ref Range Status   06/05/2024 4.4 3.5 - 5.2 g/dL Final      Magnesium Magnesium   Date Value Ref Range Status   06/07/2024 1.9 1.6 - 2.4 mg/dL Final      Uric Acid No results found for: \"URICACID\"        Results Review:     I reviewed the patient's new clinical results.    apixaban, 5 mg, Oral, Q12H  famotidine, 20 mg, Intravenous, Daily  ferrous sulfate, 324 mg, Oral, Daily With Breakfast  levothyroxine, 50 mcg, Oral, Q AM  meropenem, 1,000 mg, Intravenous, Q8H  metoprolol succinate XL, 50 mg, Oral, Daily  potassium chloride, 10 mEq, Intravenous, Q1H  predniSONE, 5 mg, Oral, Daily  sodium chloride, 10 mL, Intravenous, Q12H  tacrolimus, 1 mg, Oral, BID  vancomycin, 125 mg, Oral, Q6H      dilTIAZem, 5-15 mg/hr, Last " Rate: 5 mg/hr (06/07/24 0808)  Pharmacy Consult,         Medication Review: Reviewed    Assessment & Plan     1) TONYA  2) DDKT  3) Chronic ImmunoRx  4) UTI - E. Coli/ESBL  5) ESBL Bacteremia  6) C. Diff  7) Lactic acidosis  8) Tachycardia  9) Hypokalemia  10) CHF  11) Hyponatremia     PLAN: Cr improving towards baseline.  Holding diuretics.  ABX for UTI, bacteremia and C. Diff per primary.  Jardiance D/C'd indefinitely.  Replace K and Phos.  F/U FK level.  Will follow.      Naun Falcon MD  [facility]  06/07/24  08:33 EDT

## 2024-06-08 PROBLEM — A04.72 C. DIFFICILE COLITIS: Status: ACTIVE | Noted: 2024-06-08

## 2024-06-08 LAB
ALBUMIN SERPL-MCNC: 3.2 G/DL (ref 3.5–5.2)
ALBUMIN/GLOB SERPL: 1.2 G/DL
ALP SERPL-CCNC: 77 U/L (ref 39–117)
ALT SERPL W P-5'-P-CCNC: 8 U/L (ref 1–33)
ANION GAP SERPL CALCULATED.3IONS-SCNC: 10.7 MMOL/L (ref 5–15)
ANISOCYTOSIS BLD QL: ABNORMAL
AST SERPL-CCNC: 13 U/L (ref 1–32)
BILIRUB SERPL-MCNC: 0.5 MG/DL (ref 0–1.2)
BUN SERPL-MCNC: 48 MG/DL (ref 8–23)
BUN/CREAT SERPL: 40.7 (ref 7–25)
CALCIUM SPEC-SCNC: 9.3 MG/DL (ref 8.6–10.5)
CHLORIDE SERPL-SCNC: 94 MMOL/L (ref 98–107)
CO2 SERPL-SCNC: 27.3 MMOL/L (ref 22–29)
CREAT SERPL-MCNC: 1.18 MG/DL (ref 0.57–1)
DEPRECATED RDW RBC AUTO: 55.6 FL (ref 37–54)
EGFRCR SERPLBLD CKD-EPI 2021: 47.7 ML/MIN/1.73
ELLIPTOCYTES BLD QL SMEAR: ABNORMAL
EOSINOPHIL # BLD MANUAL: 0.16 10*3/MM3 (ref 0–0.4)
EOSINOPHIL NFR BLD MANUAL: 1 % (ref 0.3–6.2)
ERYTHROCYTE [DISTWIDTH] IN BLOOD BY AUTOMATED COUNT: 16.2 % (ref 12.3–15.4)
GLOBULIN UR ELPH-MCNC: 2.6 GM/DL
GLUCOSE SERPL-MCNC: 121 MG/DL (ref 65–99)
HCT VFR BLD AUTO: 35.1 % (ref 34–46.6)
HGB BLD-MCNC: 10.8 G/DL (ref 12–15.9)
LYMPHOCYTES # BLD MANUAL: 0.31 10*3/MM3 (ref 0.7–3.1)
LYMPHOCYTES NFR BLD MANUAL: 2 % (ref 5–12)
MAGNESIUM SERPL-MCNC: 2 MG/DL (ref 1.6–2.4)
MCH RBC QN AUTO: 28.6 PG (ref 26.6–33)
MCHC RBC AUTO-ENTMCNC: 30.8 G/DL (ref 31.5–35.7)
MCV RBC AUTO: 93.1 FL (ref 79–97)
MONOCYTES # BLD: 0.31 10*3/MM3 (ref 0.1–0.9)
NEUTROPHILS # BLD AUTO: 14.94 10*3/MM3 (ref 1.7–7)
NEUTROPHILS NFR BLD MANUAL: 88 % (ref 42.7–76)
NEUTS BAND NFR BLD MANUAL: 7 % (ref 0–5)
PHOSPHATE SERPL-MCNC: 2.1 MG/DL (ref 2.5–4.5)
PHOSPHATE SERPL-MCNC: 2.5 MG/DL (ref 2.5–4.5)
PLATELET # BLD AUTO: 205 10*3/MM3 (ref 140–450)
PMV BLD AUTO: 10 FL (ref 6–12)
POIKILOCYTOSIS BLD QL SMEAR: ABNORMAL
POTASSIUM SERPL-SCNC: 3.2 MMOL/L (ref 3.5–5.2)
POTASSIUM SERPL-SCNC: 4.4 MMOL/L (ref 3.5–5.2)
PROT SERPL-MCNC: 5.8 G/DL (ref 6–8.5)
RBC # BLD AUTO: 3.77 10*6/MM3 (ref 3.77–5.28)
SCAN SLIDE: NORMAL
SMALL PLATELETS BLD QL SMEAR: ADEQUATE
SODIUM SERPL-SCNC: 132 MMOL/L (ref 136–145)
VARIANT LYMPHS NFR BLD MANUAL: 2 % (ref 19.6–45.3)
WBC MORPH BLD: NORMAL
WBC NRBC COR # BLD AUTO: 15.73 10*3/MM3 (ref 3.4–10.8)

## 2024-06-08 PROCEDURE — 85025 COMPLETE CBC W/AUTO DIFF WBC: CPT

## 2024-06-08 PROCEDURE — 80053 COMPREHEN METABOLIC PANEL: CPT | Performed by: INTERNAL MEDICINE

## 2024-06-08 PROCEDURE — 97167 OT EVAL HIGH COMPLEX 60 MIN: CPT

## 2024-06-08 PROCEDURE — 84100 ASSAY OF PHOSPHORUS: CPT | Performed by: INTERNAL MEDICINE

## 2024-06-08 PROCEDURE — 97162 PT EVAL MOD COMPLEX 30 MIN: CPT

## 2024-06-08 PROCEDURE — 63710000001 TACROLIMUS PER 1 MG

## 2024-06-08 PROCEDURE — 83735 ASSAY OF MAGNESIUM: CPT | Performed by: INTERNAL MEDICINE

## 2024-06-08 PROCEDURE — 63710000001 PREDNISONE PER 5 MG

## 2024-06-08 PROCEDURE — 85007 BL SMEAR W/DIFF WBC COUNT: CPT

## 2024-06-08 PROCEDURE — 84132 ASSAY OF SERUM POTASSIUM: CPT | Performed by: INTERNAL MEDICINE

## 2024-06-08 PROCEDURE — 25010000002 MEROPENEM PER 100 MG: Performed by: INTERNAL MEDICINE

## 2024-06-08 PROCEDURE — 25010000002 ONDANSETRON PER 1 MG

## 2024-06-08 RX ORDER — FAMOTIDINE 20 MG/1
20 TABLET, FILM COATED ORAL DAILY
Status: DISCONTINUED | OUTPATIENT
Start: 2024-06-09 | End: 2024-06-13 | Stop reason: HOSPADM

## 2024-06-08 RX ORDER — POTASSIUM CHLORIDE 20 MEQ/1
40 TABLET, EXTENDED RELEASE ORAL EVERY 4 HOURS
Qty: 4 TABLET | Refills: 0 | Status: COMPLETED | OUTPATIENT
Start: 2024-06-08 | End: 2024-06-08

## 2024-06-08 RX ADMIN — TACROLIMUS 1 MG: 1 CAPSULE ORAL at 08:28

## 2024-06-08 RX ADMIN — MEROPENEM 1000 MG: 1 INJECTION, POWDER, FOR SOLUTION INTRAVENOUS at 17:22

## 2024-06-08 RX ADMIN — DILTIAZEM HYDROCHLORIDE 30 MG: 30 TABLET, FILM COATED ORAL at 05:31

## 2024-06-08 RX ADMIN — VANCOMYCIN HYDROCHLORIDE 250 MG: 250 CAPSULE ORAL at 05:31

## 2024-06-08 RX ADMIN — APIXABAN 5 MG: 5 TABLET, FILM COATED ORAL at 08:27

## 2024-06-08 RX ADMIN — Medication 250 MG: at 08:27

## 2024-06-08 RX ADMIN — ONDANSETRON 4 MG: 2 INJECTION INTRAMUSCULAR; INTRAVENOUS at 14:13

## 2024-06-08 RX ADMIN — MEROPENEM 1000 MG: 1 INJECTION, POWDER, FOR SOLUTION INTRAVENOUS at 05:31

## 2024-06-08 RX ADMIN — Medication 10 ML: at 21:16

## 2024-06-08 RX ADMIN — VANCOMYCIN HYDROCHLORIDE 250 MG: 250 CAPSULE ORAL at 23:50

## 2024-06-08 RX ADMIN — PREDNISONE 5 MG: 5 TABLET ORAL at 08:27

## 2024-06-08 RX ADMIN — DILTIAZEM HYDROCHLORIDE 30 MG: 30 TABLET, FILM COATED ORAL at 14:02

## 2024-06-08 RX ADMIN — Medication 250 MG: at 21:15

## 2024-06-08 RX ADMIN — FAMOTIDINE 20 MG: 10 INJECTION INTRAVENOUS at 08:28

## 2024-06-08 RX ADMIN — ACETAMINOPHEN 650 MG: 325 TABLET, FILM COATED ORAL at 07:03

## 2024-06-08 RX ADMIN — DULOXETINE HYDROCHLORIDE 40 MG: 20 CAPSULE, DELAYED RELEASE ORAL at 08:28

## 2024-06-08 RX ADMIN — TACROLIMUS 1 MG: 1 CAPSULE ORAL at 21:15

## 2024-06-08 RX ADMIN — POTASSIUM CHLORIDE 40 MEQ: 1500 TABLET, EXTENDED RELEASE ORAL at 08:28

## 2024-06-08 RX ADMIN — VANCOMYCIN HYDROCHLORIDE 250 MG: 250 CAPSULE ORAL at 12:05

## 2024-06-08 RX ADMIN — FERROUS SULFATE TAB EC 324 MG (65 MG FE EQUIVALENT) 324 MG: 324 (65 FE) TABLET DELAYED RESPONSE at 08:27

## 2024-06-08 RX ADMIN — APIXABAN 5 MG: 5 TABLET, FILM COATED ORAL at 21:15

## 2024-06-08 RX ADMIN — DILTIAZEM HYDROCHLORIDE 30 MG: 30 TABLET, FILM COATED ORAL at 21:15

## 2024-06-08 RX ADMIN — Medication 2 PACKET: at 08:27

## 2024-06-08 RX ADMIN — POTASSIUM CHLORIDE 40 MEQ: 1500 TABLET, EXTENDED RELEASE ORAL at 05:31

## 2024-06-08 RX ADMIN — METOPROLOL SUCCINATE 50 MG: 50 TABLET, EXTENDED RELEASE ORAL at 08:27

## 2024-06-08 RX ADMIN — VANCOMYCIN HYDROCHLORIDE 250 MG: 250 CAPSULE ORAL at 17:28

## 2024-06-08 RX ADMIN — LEVOTHYROXINE SODIUM 50 MCG: 0.05 TABLET ORAL at 05:31

## 2024-06-08 RX ADMIN — Medication 5 MG: at 21:23

## 2024-06-08 RX ADMIN — Medication 2 PACKET: at 05:31

## 2024-06-08 RX ADMIN — ACETAMINOPHEN 650 MG: 325 TABLET, FILM COATED ORAL at 12:05

## 2024-06-08 RX ADMIN — VANCOMYCIN HYDROCHLORIDE 250 MG: 250 CAPSULE ORAL at 00:13

## 2024-06-08 NOTE — PROGRESS NOTES
"RENAL/K[facility]:     LOS: 2 days    Patient Care Team:  Jonathan Luna APRN as PCP - General (Family Medicine)  Jak Gavin MD as Cardiologist (Cardiology)    Chief Complaint:  Fever    Subjective     Interval History:   No new complaints feels well  Heart rate controlled, off Cardizem drip  She is not on any other drips or pressors  Rectal tube remains in place    Objective     Vital Sign Min/Max for last 24 hours  Temp  Min: 97.5 °F (36.4 °C)  Max: 98 °F (36.7 °C)   BP  Min: 114/64  Max: 128/89   Pulse  Min: 83  Max: 114   Resp  Min: 20  Max: 28   SpO2  Min: 91 %  Max: 100 %   Flow (L/min)  Min: 3  Max: 3   Weight  Min: 62.5 kg (137 lb 12.6 oz)  Max: 62.5 kg (137 lb 12.6 oz)     Flowsheet Rows      Flowsheet Row First Filed Value   Admission Height 167.6 cm (66\") Documented at 06/05/2024 1152   Admission Weight 71.3 kg (157 lb 3 oz) Documented at 06/05/2024 1152            I/O this shift:  In: 120 [P.O.:120]  Out: 100 [Stool:100]  I/O last 3 completed shifts:  In: 1600 [P.O.:360; I.V.:1240]  Out: 0     Physical Exam:  GEN: Awake, NAD  ENT: PERRL, EOMI, MMM  NECK: Supple, no JVD  CHEST: CTAB, no W/R/C  CV: RRR, no M/G/R  ABD: Soft, NT, +BS, rectal tube in place  SKIN: Warm and Dry  NEURO: CN's intact      WBC WBC   Date Value Ref Range Status   06/08/2024 15.73 (H) 3.40 - 10.80 10*3/mm3 Final   06/07/2024 14.63 (H) 3.40 - 10.80 10*3/mm3 Final   06/06/2024 19.84 (H) 3.40 - 10.80 10*3/mm3 Final      HGB Hemoglobin   Date Value Ref Range Status   06/08/2024 10.8 (L) 12.0 - 15.9 g/dL Final   06/07/2024 10.6 (L) 12.0 - 15.9 g/dL Final   06/06/2024 13.5 12.0 - 15.9 g/dL Final      HCT Hematocrit   Date Value Ref Range Status   06/08/2024 35.1 34.0 - 46.6 % Final   06/07/2024 34.9 34.0 - 46.6 % Final   06/06/2024 43.6 34.0 - 46.6 % Final      Platlets No results found for: \"LABPLAT\"   MCV MCV   Date Value Ref Range Status   06/08/2024 93.1 79.0 - 97.0 fL Final   06/07/2024 94.1 79.0 - 97.0 fL Final   06/06/2024 93.6 79.0 - " "97.0 fL Final          Sodium Sodium   Date Value Ref Range Status   06/08/2024 132 (L) 136 - 145 mmol/L Final   06/07/2024 131 (L) 136 - 145 mmol/L Final   06/06/2024 139 136 - 145 mmol/L Final      Potassium Potassium   Date Value Ref Range Status   06/08/2024 3.2 (L) 3.5 - 5.2 mmol/L Final   06/07/2024 3.8 3.5 - 5.2 mmol/L Final     Comment:     Specimen hemolyzed.  Result may be falsely elevated.   06/07/2024 2.8 (L) 3.5 - 5.2 mmol/L Final   06/06/2024 3.2 (L) 3.5 - 5.2 mmol/L Final      Chloride Chloride   Date Value Ref Range Status   06/08/2024 94 (L) 98 - 107 mmol/L Final   06/07/2024 92 (L) 98 - 107 mmol/L Final   06/06/2024 93 (L) 98 - 107 mmol/L Final      CO2 CO2   Date Value Ref Range Status   06/08/2024 27.3 22.0 - 29.0 mmol/L Final   06/07/2024 28.8 22.0 - 29.0 mmol/L Final   06/06/2024 29.6 (H) 22.0 - 29.0 mmol/L Final      BUN BUN   Date Value Ref Range Status   06/08/2024 48 (H) 8 - 23 mg/dL Final   06/07/2024 42 (H) 8 - 23 mg/dL Final   06/06/2024 47 (H) 8 - 23 mg/dL Final      Creatinine Creatinine   Date Value Ref Range Status   06/08/2024 1.18 (H) 0.57 - 1.00 mg/dL Final   06/07/2024 1.04 (H) 0.57 - 1.00 mg/dL Final   06/06/2024 1.16 (H) 0.57 - 1.00 mg/dL Final      Calcium Calcium   Date Value Ref Range Status   06/08/2024 9.3 8.6 - 10.5 mg/dL Final   06/07/2024 9.4 8.6 - 10.5 mg/dL Final   06/06/2024 10.2 8.6 - 10.5 mg/dL Final      PO4 No results found for: \"CAPO4\"   Albumin Albumin   Date Value Ref Range Status   06/08/2024 3.2 (L) 3.5 - 5.2 g/dL Final      Magnesium Magnesium   Date Value Ref Range Status   06/08/2024 2.0 1.6 - 2.4 mg/dL Final   06/07/2024 1.9 1.6 - 2.4 mg/dL Final      Uric Acid No results found for: \"URICACID\"        Results Review:     I reviewed the patient's new clinical results.    apixaban, 5 mg, Oral, Q12H  dilTIAZem, 30 mg, Oral, Q8H  DULoxetine, 40 mg, Oral, Daily  [START ON 6/9/2024] famotidine, 20 mg, Oral, Daily  ferrous sulfate, 324 mg, Oral, Daily With " Breakfast  levothyroxine, 50 mcg, Oral, Q AM  meropenem, 1,000 mg, Intravenous, Q12H  metoprolol succinate XL, 50 mg, Oral, Daily  predniSONE, 5 mg, Oral, Daily  saccharomyces boulardii, 250 mg, Oral, BID  sodium chloride, 10 mL, Intravenous, Q12H  tacrolimus, 1 mg, Oral, BID  vancomycin, 250 mg, Oral, Q6H      dilTIAZem, 5-15 mg/hr, Last Rate: Stopped (06/07/24 0849)  Pharmacy Consult,         Medication Review: Reviewed    Assessment & Plan     1) TONYA, improved and near baseline  2) DDKT  3) Chronic ImmunoRx  4) UTI - E. Coli/ESBL lactate level improving  5) ESBL Bacteremia  6) C. Diff  7) Lactic acidosis  8) Tachycardia  9) Hypokalemia, related to GI loss  10) CHF  11) Hyponatremia     PLAN:   Her renal graft function has improved, creatinine near baseline  She is euvolemic on exam  No need for IV fluids or any diuretic therapy at this time  Continue IV antibiotics, dosed for GFR around 50  Replace potassium orally, okay to use protocol  Continue current immunosuppression regimen with Prograf level next week  On oral Vanco for C. difficile, IV antibiotics for UTI      Jardiance D/C'd indefinitely.     Michael Clay MD  [facility]  06/08/24  14:27 EDT

## 2024-06-08 NOTE — PLAN OF CARE
Goal Outcome Evaluation:  Plan of Care Reviewed With: patient        Progress: improving  Outcome Evaluation: 79 y.o. female with a h/o chronic HFpEF, atrial fibrillation, COPD, non-obstructive CAD, asthma, HTN, kidney transplant and lung cancer who presented to the ER with shortness of breath and weakness.  She was also noted to have bilateral lower extremity edema.  Patient was recently hospitalized for UTI and A fib with RVR.  She was discharged on 5/22/24, and she refused discharge to a SNF as recommended.  She also refused home health and outpatient PT.  Pt states generally she is independent with mobility, ambulation with RWx and has had recent falls. Pt reports she normally stays in her home unless her son assists her down the few steps out. Pt is seen by OT in the [facility] with a fecal management system placed due to her CDIFF symptoms. Pt states she is starting to feel a little better but still has no appetite. She was able to get up to the chair and her vital signs were stable during and after an ambulatory transfer. Pt sat EOB well but needed min to mod (A) of two persons to step to the chair. Recommend she use RW in the future for trnasfers and ambulation. Pt will need rehab at d/c. She is not safe to go home at d/c. Pt agrees. Recommend SNF.      Anticipated Discharge Disposition (OT): skilled nursing facility

## 2024-06-08 NOTE — PROGRESS NOTES
Infectious Diseases Progress Note      LOS: 2 days   Patient Care Team:  Jonathan Luna APRN as PCP - General (Family Medicine)  Jak Gavin MD as Cardiologist (Cardiology)    Chief Complaint: Diarrhea    Subjective     The had no fever during the last 24 hours.  Remained hemodynamically stable requiring no vasopressors.  Continues to have diarrhea and required fecal management system    Review of Systems:   Review of Systems   Constitutional: Negative.  Positive for fatigue.   HENT: Negative.     Eyes: Negative.    Respiratory: Negative.     Cardiovascular: Negative.    Gastrointestinal: Negative.  Positive for diarrhea.   Genitourinary: Negative.    Musculoskeletal: Negative.    Skin: Negative.    Neurological: Negative.    Hematological: Negative.    Psychiatric/Behavioral: Negative.          Objective     Vital Signs  Temp:  [97.5 °F (36.4 °C)-98 °F (36.7 °C)] 97.6 °F (36.4 °C)  Heart Rate:  [] 89  Resp:  [20-28] 21  BP: (114-128)/(53-89) 114/64    Physical Exam:  Physical Exam  Vitals and nursing note reviewed.   Constitutional:       Appearance: She is well-developed.   HENT:      Head: Normocephalic and atraumatic.   Eyes:      Pupils: Pupils are equal, round, and reactive to light.   Cardiovascular:      Rate and Rhythm: Normal rate and regular rhythm.      Heart sounds: Normal heart sounds.   Pulmonary:      Effort: Pulmonary effort is normal. No respiratory distress.      Breath sounds: Normal breath sounds. No wheezing or rales.   Abdominal:      General: Bowel sounds are normal. There is no distension.      Palpations: Abdomen is soft. There is no mass.      Tenderness: There is abdominal tenderness. There is no guarding or rebound.   Musculoskeletal:         General: No deformity. Normal range of motion.      Cervical back: Normal range of motion and neck supple.   Skin:     General: Skin is warm.      Findings: No erythema or rash.   Neurological:      Mental Status: She is alert and oriented  to person, place, and time.      Cranial Nerves: No cranial nerve deficit.          Results Review:    I have reviewed all clinical data, test, lab, and imaging results.     Radiology  No Radiology Exams Resulted Within Past 24 Hours    Cardiology    Laboratory    Results from last 7 days   Lab Units 06/08/24  0352 06/07/24  0522 06/06/24  0235 06/05/24  1223 06/03/24  0929 06/02/24  0130   WBC 10*3/mm3 15.73* 14.63* 19.84* 23.12* 13.71* 9.85   HEMOGLOBIN g/dL 10.8* 10.6* 13.5 13.4 10.7* 10.6*   HEMATOCRIT % 35.1 34.9 43.6 43.5 35.2 35.0   PLATELETS 10*3/mm3 205 199 229 271 222 195     Results from last 7 days   Lab Units 06/08/24  0352 06/07/24  1506 06/07/24  0522 06/06/24  0235 06/05/24  1223 06/03/24  0929 06/02/24  0130   SODIUM mmol/L 132*  --  131* 139 134* 135* 136   POTASSIUM mmol/L 3.2* 3.8 2.8* 3.2* 3.1* 3.7 4.0   CHLORIDE mmol/L 94*  --  92* 93* 87* 98 98   CO2 mmol/L 27.3  --  28.8 29.6* 28.9 27.7 25.8   BUN mg/dL 48*  --  42* 47* 50* 27* 35*   CREATININE mg/dL 1.18*  --  1.04* 1.16* 1.45* 0.76 1.00   GLUCOSE mg/dL 121*  --  203* 201* 320* 151* 154*   ALBUMIN g/dL 3.2*  --   --   --  4.4  --   --    BILIRUBIN mg/dL 0.5  --   --   --  1.4*  --   --    ALK PHOS U/L 77  --   --   --  94  --   --    AST (SGOT) U/L 13  --   --   --  31  --   --    ALT (SGPT) U/L 8  --   --   --  17  --   --    CALCIUM mg/dL 9.3  --  9.4 10.2 10.3 10.3 10.3                 Microbiology   Microbiology Results (last 10 days)       Procedure Component Value - Date/Time    Clostridioides difficile Toxin - Stool, Per Rectum [[id number]]  (Abnormal) Collected: 06/07/24 0438    Lab Status: Final result Specimen: Stool from Per Rectum Updated: 06/07/24 0701    Narrative:      The following orders were created for panel order Clostridioides difficile Toxin - Stool, Per Rectum.  Procedure                               Abnormality         Status                     ---------                               -----------         ------                      Clostridioides difficile...[[id number]]  Abnormal            Final result                 Please view results for these tests on the individual orders.    Clostridioides difficile EIA - Stool, Per Rectum [[id number]]  (Abnormal) Collected: 06/07/24 0438    Lab Status: Final result Specimen: Stool from Per Rectum Updated: 06/07/24 0701     C Diff GDH Ag Positive     C.diff Toxin Ag Positive    Narrative:      DNA from a toxigenic strain of C.difficile was detected, along with the presence of free toxin. These results are suggestive of C.difficile infection, in context of an appropriate clinical scenario.    CANDIDA AURIS PCR - Swab, Axilla Right, Axilla Left and Groin [[id number]]  (Normal) Collected: 06/06/24 0848    Lab Status: Final result Specimen: Swab from Axilla Right, Axilla Left and Groin Updated: 06/07/24 0948     CANDIDA AURIS PCR Not Detected    Gastrointestinal Panel, PCR - Stool, Per Rectum [[id number]]  (Normal) Collected: 06/05/24 1818    Lab Status: Final result Specimen: Stool from Per Rectum Updated: 06/05/24 2034     Campylobacter Not Detected     Plesiomonas shigelloides Not Detected     Salmonella Not Detected     Vibrio Not Detected     Vibrio cholerae Not Detected     Yersinia enterocolitica Not Detected     Enteroaggregative E. coli (EAEC) Not Detected     Enteropathogenic E. coli (EPEC) Not Detected     Enterotoxigenic E. coli (ETEC) lt/st Not Detected     Shiga-like toxin-producing E. coli (STEC) stx1/stx2 Not Detected     Shigella/Enteroinvasive E. coli (EIEC) Not Detected     Cryptosporidium Not Detected     Cyclospora cayetanensis Not Detected     Entamoeba histolytica Not Detected     Giardia lamblia Not Detected     Adenovirus F40/41 Not Detected     Astrovirus Not Detected     Norovirus GI/GII Not Detected     Rotavirus A Not Detected     Sapovirus (I, II, IV or V) Not Detected    Narrative:      If Aeromonas, Staphylococcus aureus or Bacillus cereus are suspected, please  order [id number]: Stool Culture, Aeromonas, S aureus, B Cereus.    Respiratory Panel PCR w/COVID-19(SARS-CoV-2) HODAN/MARIA M/TWILA/PAD/COR/JENISE In-House, NP Swab in UTM/VTM, 2 HR TAT - Swab, Nasopharynx [[id number]]  (Normal) Collected: 06/05/24 1224    Lab Status: Final result Specimen: Swab from Nasopharynx Updated: 06/05/24 1326     ADENOVIRUS, PCR Not Detected     Coronavirus 229E Not Detected     Coronavirus HKU1 Not Detected     Coronavirus NL63 Not Detected     Coronavirus OC43 Not Detected     COVID19 Not Detected     Human Metapneumovirus Not Detected     Human Rhinovirus/Enterovirus Not Detected     Influenza A PCR Not Detected     Influenza B PCR Not Detected     Parainfluenza Virus 1 Not Detected     Parainfluenza Virus 2 Not Detected     Parainfluenza Virus 3 Not Detected     Parainfluenza Virus 4 Not Detected     RSV, PCR Not Detected     Bordetella pertussis pcr Not Detected     Bordetella parapertussis PCR Not Detected     Chlamydophila pneumoniae PCR Not Detected     Mycoplasma pneumo by PCR Not Detected    Narrative:      In the setting of a positive respiratory panel with a viral infection PLUS a negative procalcitonin without other underlying concern for bacterial infection, consider observing off antibiotics or discontinuation of antibiotics and continue supportive care. If the respiratory panel is positive for atypical bacterial infection (Bordetella pertussis, Chlamydophila pneumoniae, or Mycoplasma pneumoniae), consider antibiotic de-escalation to target atypical bacterial infection.    Urine Culture - Urine, Straight Cath [[id number]]  (Abnormal)  (Susceptibility) Collected: 06/05/24 1224    Lab Status: Final result Specimen: Urine from Straight Cath Updated: 06/07/24 1039     Urine Culture >100,000 CFU/mL Escherichia coli ESBL     Comment:   Consider infectious disease consult.  Susceptibility results may not correlate to clinical outcomes.       Narrative:      Colonization of the urinary tract without  infection is common. Treatment is discouraged unless the patient is symptomatic, pregnant, or undergoing an invasive urologic procedure.  Recent outcomes data supports the use of pip/tazo in the treatment of susceptible ESBL infections for uncomplicated UTI. Consider use of pip/tazo as a carbapenem-sparing regimen in applicable patients.    Susceptibility        Escherichia coli ESBL      ROBERT      Ertapenem Susceptible      Gentamicin Susceptible      Levofloxacin Resistant      Meropenem Susceptible      Nitrofurantoin Susceptible      Piperacillin + Tazobactam Susceptible      Trimethoprim + Sulfamethoxazole Resistant                           Blood Culture - Blood, Arm, Left [[id number]]  (Normal) Collected: 06/05/24 1223    Lab Status: Preliminary result Specimen: Blood from Arm, Left Updated: 06/08/24 1245     Blood Culture No growth at 3 days    Blood Culture - Blood, Arm, Left [[id number]]  (Abnormal) Collected: 06/05/24 1223    Lab Status: Preliminary result Specimen: Blood from Arm, Left Updated: 06/08/24 0909     Blood Culture Escherichia coli     Isolated from Aerobic Bottle     Gram Stain Aerobic Bottle Gram negative bacilli    Narrative:      Less than seven (7) mL's of blood was collected.  Insufficient quantity may yield false negative results.    Blood Culture ID, PCR - Blood, Arm, Left [[id number]]  (Abnormal) Collected: 06/05/24 1223    Lab Status: Final result Specimen: Blood from Arm, Left Updated: 06/07/24 0608     BCID, PCR Escherichia coli. Identification by BCID2 PCR.     BCID, PCR 2 CTX-M (ESBL) detected. Identification by BCID2 PCR     BOTTLE TYPE Aerobic Bottle            Medication Review:       Schedule Meds  apixaban, 5 mg, Oral, Q12H  dilTIAZem, 30 mg, Oral, Q8H  DULoxetine, 40 mg, Oral, Daily  [START ON 6/9/2024] famotidine, 20 mg, Oral, Daily  ferrous sulfate, 324 mg, Oral, Daily With Breakfast  levothyroxine, 50 mcg, Oral, Q AM  meropenem, 1,000 mg, Intravenous, Q12H  metoprolol  succinate XL, 50 mg, Oral, Daily  predniSONE, 5 mg, Oral, Daily  saccharomyces boulardii, 250 mg, Oral, BID  sodium chloride, 10 mL, Intravenous, Q12H  tacrolimus, 1 mg, Oral, BID  vancomycin, 250 mg, Oral, Q6H        Infusion Meds  dilTIAZem, 5-15 mg/hr, Last Rate: Stopped (06/07/24 0849)  Pharmacy Consult,         PRN Meds    acetaminophen    Calcium Replacement - Follow Nurse / BPA Driven Protocol    carboxymethylcellulose sod    diazePAM    loperamide    Magnesium Standard Dose Replacement - Follow Nurse / BPA Driven Protocol    melatonin    ondansetron    Pharmacy Consult    Phosphorus Replacement - Follow Nurse / BPA Driven Protocol    Potassium Replacement - Follow Nurse / BPA Driven Protocol    [COMPLETED] Insert Peripheral IV **AND** sodium chloride    sodium chloride    sodium chloride        Assessment & Plan       Antimicrobial Therapy   1.  IV meropenem        2.  P.o. vancomycin        3.        4.        5.          Assessment     E. coli bacteremia secondary to complicated UTI.  CT scan of abdomen pelvis showed no acute findings.  The organism is probably ESBL  based on BCID     Complicated UTI with bacteremia.  Urine culture grew ESBL  E. coli.  Patient had recent episode of E. coli/ESBL  UTI and treated with p.o. fosfomycin     C. difficile colitis.  According to patient this is her second episode and she had 1 episode in 2013.  Currently has a fecal management system     Reactive leukocytosis secondary to above and steroids.  White count remained stable     History of COPD chronically on 4 L of oxygen via nasal cannula     History of kidney transplant in 2017 secondary to granulomatosis polyangiitis.  Currently on prednisone and Prograf     History of lung cancer in the past     Plan     Continue IV meropenem 1 g every 12 hours for total of 14 days  Continue p.o. vancomycin  250 mg every 6 hours.  We will consider 3 weeks of treatment with p.o. vancomycin.  Continue  probiotics  Continue supportive care  A.m. labs  Enteric isolation for C. difficile colitis  Contact isolation for ESBL  E. Coli  Appropriate PPE was placed on before entering the room           Dirk Robin MD  06/08/24  14:08 EDT    Note is dictated utilizing voice recognition software/Dragon

## 2024-06-08 NOTE — THERAPY EVALUATION
Patient Name: Jaja Carcamo  : 1945    MRN: [id number]                              Today's Date: 2024       Admit Date: 2024    Visit Dx:     ICD-10-CM ICD-9-CM   1. Urinary tract infection without hematuria, site unspecified  N39.0 599.0   2. Sepsis, due to unspecified organism, unspecified whether acute organ dysfunction present  A41.9 038.9     995.91   3. Renal insufficiency  N28.9 593.9     Patient Active Problem List   Diagnosis    COPD (chronic obstructive pulmonary disease)    Acute UTI    Atrial fibrillation with rapid ventricular response    Volume overload    Fall    Hypothyroidism, unspecified    Hyperlipidemia    History of suicide attempt    History of lobectomy of lung    Irritable bowel syndrome    History of kidney transplant    Long term (current) use of immunosuppressive biologic    Long term current use of anticoagulant therapy    Menopausal flushing    Mitral valve regurgitation    Tricuspid valve regurgitation    History of lung cancer    Nonobstructive atherosclerosis of coronary artery    Obsessive-compulsive disorder    Osteoarthritis of left hip    Primary osteoarthritis of right hip    Paroxysmal supraventricular tachycardia    Peripheral neuropathy    History of DVT (deep vein thrombosis)    Personal history of peptic ulcer disease    Pulmonary hypertension    Seasonal allergies    History of repair of hip joint    Gout    Gastroesophageal reflux disease    Hearing loss    Edema of both lower extremities due to peripheral venous insufficiency    Mixed anxiety and depressive disorder    Chronic pain    Chronic hypoxemic respiratory failure    Chronic diarrhea    Asthma    Osteoarthritis    Allergic rhinitis    Wegener's granulomatosis with renal involvement    Essential (primary) hypertension    Paroxysmal atrial fibrillation    Valvular heart disease    Acute exacerbation of CHF (congestive heart failure)    UTI (urinary tract infection)    C. difficile colitis     Past  Medical History:   Diagnosis Date    Allergic rhinitis 04/14/2015    Asthma 08/10/2017    Atrial flutter 05/11/2017    Chronic diarrhea 04/15/2019    Chronic hypoxemic respiratory failure 01/18/2024    Chronic pain 02/03/2015    COPD (chronic obstructive pulmonary disease)     COVID-19 virus detected 08/11/2022    Cytokine release syndrome, grade 1 08/16/2022    Edema of both lower extremities due to peripheral venous insufficiency 03/12/2021    ESRD on hemodialysis 05/22/2013    Essential (primary) hypertension 03/03/2022    Fracture of ulnar styloid 05/19/2022    Fracture of unspecified carpal bone, right wrist, subsequent encounter for fracture with routine healing 03/03/2022    Gastroesophageal reflux disease 11/12/2020    Gout 08/10/2017    Hearing loss 08/10/2017    History of appendectomy     History of DVT (deep vein thrombosis) 11/12/2020    History of kidney transplant 06/30/2017    History of lobectomy of lung 01/18/2024 03/08/2016:  RIGHT Lower Lobe Mass--> Right Video-assisted thoracoscopy with a moderate-to-large wedge resection of the RLL (by Dr. Haris Mcconnell @ [facility])--> Poorly differentiated carcinoma of the RLL.      History of repair of hip joint 05/21/2013    History of suicide attempt 05/20/2024    Hyperlipidemia 08/11/2014    Hypothyroidism, unspecified 03/03/2022    Infection due to extended spectrum beta lactamase (ESBL) producing bacteria 03/11/2016    No A2K system hx. +ESBL E coli urine on 3/11/16.      Irritable bowel syndrome 04/15/2019    Long term (current) use of immunosuppressive biologic 04/28/2021    Long term current use of anticoagulant therapy 01/18/2024    Malignant neoplasm of lung 08/10/2017    Menopausal flushing 08/30/2021    Mitral valve regurgitation 07/06/2015    Mixed anxiety and depressive disorder 04/30/2015    Non-small cell lung cancer 08/10/2017    Nonobstructive atherosclerosis of coronary artery 06/02/2019 08/12/2022: CATH: [facility]: NSTEMI  assoc with Covid-19: LM:-nl;  LAD: diffuse, Ca++; 30%; CIRC: Dominant. Normal; RCA: small; 50% diffuse, proximal and mid-segment.      NSTEMI (non-ST elevated myocardial infarction) 08/11/2022    Obsessive-compulsive disorder 04/15/2019    Osteoarthritis 05/20/2024    Paroxysmal atrial fibrillation 05/11/2017    Paroxysmal supraventricular tachycardia 05/11/2017    Peripheral neuropathy 08/11/2014    Personal history of peptic ulcer disease 11/12/2020    Postoperative anemia due to acute blood loss 05/22/2013    Right wrist fracture 03/01/2022    Seasonal allergies 08/10/2017    Secondary hyperparathyroidism of renal origin 09/14/2015    Tricuspid valve regurgitation 03/15/2017    Ulcer of lower extremity 08/30/2021    Unspecified acute appendicitis 03/03/2022    Valvular heart disease 05/20/2024    Wegener's granulomatosis with renal involvement 03/03/2022     Past Surgical History:   Procedure Laterality Date    APPENDECTOMY      BREAST SURGERY Left     cysts rmeoved    CARDIAC CATHETERIZATION Right 08/12/2022    Procedure: Left Heart Cath and coronary angiogram;  Surgeon: Jak Gavin MD;  Location: [facility] CATH INVASIVE LOCATION;  Service: Cardiology;  Laterality: Right;    CLOSED REDUCTION WRIST FRACTURE Right 03/01/2022    Procedure: WRIST CLOSED REDUCTION;  Surgeon: Gaudencio Townsend MD;  Location: [facility] MAIN OR;  Service: Orthopedics;  Laterality: Right;    CYSTOSCOPY      HIP ARTHROPLASTY      HYSTERECTOMY      LUNG LOBECTOMY Right     TRANSPLANTATION RENAL        General Information       Row Name 06/08/24 1541          General Information    Prior Level of Function independent:  Reports she doesn't leave her house much, that son helps her on the steps if she does leave. Neighbors come to mow the lawn, Son shops for her & takes out the trash.  -     Existing Precautions/Restrictions fall;other (see comments)  [facility]  -     Barriers to Rehab medically complex;previous functional deficit  -       Row Name  06/08/24 1548          Living Environment    People in Home alone  -[facility] Name 06/08/24 1548          Home Main Entrance    Number of Stairs, Main Entrance three  -     Stair Railings, Main Entrance railings safe and in good condition  -[facility] Name 06/08/24 1548          Stairs Within Home, Primary    Number of Stairs, Within Home, Primary none  -[facility] Name 06/08/24 1548          Cognition    Orientation Status (Cognition) oriented x 4  -[facility] Name 06/08/24 1548          Safety Issues, Functional Mobility    Impairments Affecting Function (Mobility) balance;strength;pain;endurance/activity tolerance  -               User Key  (r) = Recorded By, (t) = Taken By, (c) = Cosigned By      Initials Name Provider Type     Theresa Lazaro, OT Occupational Therapist                     Mobility/ADL's       [facility] Name 06/08/24 1552          Bed Mobility    Bed Mobility supine-sit  -     Supine-Sit [place] (Bed Mobility) minimum assist (75% patient effort)  -     Assistive Device (Bed Mobility) bed rails;head of bed elevated  -[facility] Name 06/08/24 1552          Transfers    Transfers bed-chair transfer  -[facility] Name 06/08/24 1552          Bed-Chair Transfer    Bed-Chair [place] (Transfers) 2 person assist;minimum assist (75% patient effort);moderate assist (50% patient effort)  -     Comment, (Bed-Chair Transfer) needs to use RW, legs buckling but could offload onto arms if RW were used.  -[facility] Name 06/08/24 1552          Functional Mobility    Functional Mobility- Safety Issues balance decreased during turns;weight-shifting ability decreased;step length decreased  -[facility] Name 06/08/24 1552          Activities of Daily Living    BADL Assessment/Intervention lower body dressing;grooming;toileting  -[facility] Name 06/08/24 1552          Lower Body Dressing Assessment/Training    [place] Level (Lower Body Dressing) lower body dressing skills;dependent  (less than 25% patient effort)  -     Position (Lower Body Dressing) supine  -MH       Row Name 06/08/24 1552          Grooming Assessment/Training    [place] Level (Grooming) hair care, combing/brushing;dependent (less than 25% patient effort)  -MH       Row Name 06/08/24 1552          Toileting Assessment/Training    Dwarf Level (Toileting) toileting skills;dependent (less than 25% patient effort)  -     Position (Toileting) supine  -               User Key  (r) = Recorded By, (t) = Taken By, (c) = Cosigned By      Initials Name Provider Type     Theresa Lazaro OT Occupational Therapist                   Obj/Interventions       Row Name 06/08/24 1553          Sensory Assessment (Somatosensory)    Sensory Assessment (Somatosensory) sensation intact  -MH       Row Name 06/08/24 1553          Vision Assessment/Intervention    Visual Impairment/Limitations corrective lenses full-time  -MH       Row Name 06/08/24 1553          Range of Motion Comprehensive    Comment, General Range of Motion trunk flex limited 25%  -MH       Row Name 06/08/24 1553          Strength Comprehensive (MMT)    Comment, General Manual Muscle Testing (MMT) Assessment BUE 4-/5, BLE 3/5  -               User Key  (r) = Recorded By, (t) = Taken By, (c) = Cosigned By      Initials Name Provider Type     Theresa Lazaro OT Occupational Therapist                   Goals/Plan       Row Name 06/08/24 1603          Transfer Goal 1 (OT)    Activity/Assistive Device (Transfer Goal 1, OT) transfers, all;walker, rolling  -     [place] Level/Cues Needed (Transfer Goal 1, OT) minimum assist (75% or more patient effort)  -     Time Frame (Transfer Goal 1, OT) 2 weeks  -MH       Row Name 06/08/24 1603          Dressing Goal 1 (OT)    Activity/Device (Dressing Goal 1, OT) dressing skills, all  -     Dwarf/Cues Needed (Dressing Goal 1, OT) moderate assist (50-74% patient effort)  -     Time Frame (Dressing Goal 1, OT)  2 weeks  -       Row Name 06/08/24 1604          Toileting Goal 1 (OT)    Activity/Device (Toileting Goal 1, OT) toileting skills, all  -     [place] Level/Cues Needed (Toileting Goal 1, OT) moderate assist (50-74% patient effort)  -     Time Frame (Toileting Goal 1, OT) 2 weeks  -       Row Name 06/08/24 1603          Grooming Goal 1 (OT)    Activity/Device (Grooming Goal 1, OT) grooming skills, all  -MH     [place] (Grooming Goal 1, OT) minimum assist (75% or more patient effort)  -     Time Frame (Grooming Goal 1, OT) 2 weeks  -       Row Name 06/08/24 1602          Therapy Assessment/Plan (OT)    Planned Therapy Interventions (OT) activity tolerance training;BADL retraining;cognitive/visual perception retraining;occupation/activity based interventions;patient/caregiver education/training;transfer/mobility retraining;ROM/therapeutic exercise  -               User Key  (r) = Recorded By, (t) = Taken By, (c) = Cosigned By      Initials Name Provider Type     Theresa Lazaro, OT Occupational Therapist                   Clinical Impression       [facility] Name 06/08/24 2274          Pain Assessment    Pretreatment Pain Rating 3/10  -     Posttreatment Pain Rating 3/10  -     Pre/Posttreatment Pain Comment fecal tube, raw skin  -       Row Name 06/08/24 1411          Plan of Care Review    Plan of Care Reviewed With patient  -     Progress improving  -     Outcome Evaluation 79 y.o. female with a h/o chronic HFpEF, atrial fibrillation, COPD, non-obstructive CAD, asthma, HTN, kidney transplant and lung cancer who presented to the ER with shortness of breath and weakness.  She was also noted to have bilateral lower extremity edema.  Patient was recently hospitalized for UTI and A fib with RVR.  She was discharged on 5/22/24, and she refused discharge to a SNF as recommended.  She also refused home health and outpatient PT.  Pt states generally she is independent with mobility, ambulation  with RWx and has had recent falls. Pt reports she normally stays in her home unless her son assists her down the few steps out. Pt is seen by OT in the [facility] with a fecal management system placed due to her CDIFF symptoms. Pt states she is starting to feel a little better but still has no appetite. She was able to get up to the chair and her vital signs were stable during and after an ambulatory transfer. Pt sat EOB well but needed min to mod (A) of two persons to step to the chair. Recommend she use RW in the future for trnasfers and ambulation. Pt will need rehab at d/c. She is not safe to go home at d/c. Pt agrees. Recommend SNF.  -       Row Name 06/08/24 1222          Therapy Assessment/Plan (OT)    Rehab Potential (OT) good, to achieve stated therapy goals  -     Criteria for Skilled Therapeutic Interventions Met (OT) skilled treatment is necessary  -     Therapy Frequency (OT) 3 times/wk  -     Predicted Duration of Therapy Intervention (OT) until d/c  -       Row Name 06/08/24 9735          Therapy Plan Review/Discharge Plan (OT)    Anticipated Discharge Disposition (OT) skilled nursing facility  -       Row Name 06/08/24 3852          Vital Signs    Pre Systolic BP Rehab 98  -MH     Pre Treatment Diastolic BP 54  -MH     Intra Systolic BP Rehab 115  -MH     Intra Treatment Diastolic BP 58  -MH     O2 Delivery Pre Treatment room air  -     O2 Delivery Intra Treatment room air  -     O2 Delivery Post Treatment room air  -     Pre Patient Position Supine  -     Intra Patient Position Standing  -     Post Patient Position Sitting  -       Row Name 06/08/24 4320          Positioning and Restraints    Pre-Treatment Position in bed  -MH     Post Treatment Position chair  -     In Chair call light within reach;reclined;exit alarm on;encouraged to call for assist;with ns  -               User Key  (r) = Recorded By, (t) = Taken By, (c) = Cosigned By      Initials Name Provider Type      Theresa Lazaro, KIMBER Occupational Therapist                   Outcome Measures       Row Name 06/08/24 1605 06/08/24 0815       How much help from another person do you currently need...    Turning from your back to your side while in flat bed without using bedrails? 3  - 3  -GK    Moving from lying on back to sitting on the side of a flat bed without bedrails? -- 3  -GK    Moving to and from a bed to a chair (including a wheelchair)? -- 3  -GK    Standing up from a chair using your arms (e.g., wheelchair, bedside chair)? -- 3  -GK    Climbing 3-5 steps with a railing? -- 1  -GK    To walk in hospital room? -- 1  -GK    AM-PAC 6 Clicks Score (PT) -- 14  -GK    Highest Level of Mobility Goal -- 4 --> Transfer to chair/commode  -GK              User Key  (r) = Recorded By, (t) = Taken By, (c) = Cosigned By      Initials Name Provider Type     Theresa Lazaro OT Occupational Therapist     Amy Burkett RN Registered Nurse                    Occupational Therapy Education       Title: PT OT SLP Therapies (In Progress)       Topic: Occupational Therapy (In Progress)       Point: ADL training (Done)       Description:   Instruct learner(s) on proper safety adaptation and remediation techniques during self care or transfers.   Instruct in proper use of assistive devices.                  Learning Progress Summary             Patient Acceptance, E,D, SUSSY,COLTON,NR by  at 6/8/2024 1607                         Point: Home exercise program (Not Started)       Description:   Instruct learner(s) on appropriate technique for monitoring, assisting and/or progressing therapeutic exercises/activities.                  Learner Progress:  Not documented in this visit.              Point: Precautions (Done)       Description:   Instruct learner(s) on prescribed precautions during self-care and functional transfers.                  Learning Progress Summary             Patient Acceptance, E,D, SUSSY,DU,NR by  at 6/8/2024 9303                          Point: Body mechanics (Done)       Description:   Instruct learner(s) on proper positioning and spine alignment during self-care, functional mobility activities and/or exercises.                  Learning Progress Summary             Patient Acceptance, E,D, VU,DU,NR by  at 6/8/2024 5338                                         User Key       Initials Effective Dates Name Provider Type Discipline     06/16/21 -  Theresa Lazaro OT Occupational Therapist OT                  OT Recommendation and Plan  Planned Therapy Interventions (OT): activity tolerance training, BADL retraining, cognitive/visual perception retraining, occupation/activity based interventions, patient/caregiver education/training, transfer/mobility retraining, ROM/therapeutic exercise  Therapy Frequency (OT): 3 times/wk  Plan of Care Review  Plan of Care Reviewed With: patient  Progress: improving  Outcome Evaluation: 79 y.o. female with a h/o chronic HFpEF, atrial fibrillation, COPD, non-obstructive CAD, asthma, HTN, kidney transplant and lung cancer who presented to the ER with shortness of breath and weakness.  She was also noted to have bilateral lower extremity edema.  Patient was recently hospitalized for UTI and A fib with RVR.  She was discharged on 5/22/24, and she refused discharge to a SNF as recommended.  She also refused home health and outpatient PT.  Pt states generally she is independent with mobility, ambulation with RWx and has had recent falls. Pt reports she normally stays in her home unless her son assists her down the few steps out. Pt is seen by OT in the [facility] with a fecal management system placed due to her CDIFF symptoms. Pt states she is starting to feel a little better but still has no appetite. She was able to get up to the chair and her vital signs were stable during and after an ambulatory transfer. Pt sat EOB well but needed min to mod (A) of two persons to step to the chair. Recommend she use  RW in the future for trnasfers and ambulation. Pt will need rehab at d/c. She is not safe to go home at d/c. Pt agrees. Recommend SNF.     Time Calculation:         Time Calculation- OT       Row Name 06/08/24 1607             Time Calculation- OT    OT Start Time 1319  -      OT Stop Time 1340  -      OT Time Calculation (min) 21 min  -      Total Timed Code Minutes- OT 0 minute(s)  -      OT Received On 06/08/24  -      OT - Next Appointment 06/10/24  -      OT Goal Re-Cert Due Date 06/22/24  -                User Key  (r) = Recorded By, (t) = Taken By, (c) = Cosigned By      Initials Name Provider Type     Theresa Lazaro OT Occupational Therapist                  Therapy Charges for Today       Code Description Service Date Service Provider Modifiers Qty    [id number]  OT EVAL HIGH COMPLEXITY 3 6/8/2024 Theresa Lazaro OT GO 1                 Theresa Lazaro OT  6/8/2024

## 2024-06-08 NOTE — PLAN OF CARE
Goal Outcome Evaluation:  Plan of Care Reviewed With: patient           Outcome Evaluation: Pt is a 78 YO F with a h/o chronic HFpEF, atrial fibrillation, COPD, non-obstructive CAD, asthma, HTN, kidney transplant and lung cancer who presented to the ER with shortness of breath and weakness. Pt has had multiple hospitalizations within the last month, as well as falls and has been at SNF. Pt with CDiff this date and has FMS currently. Pt states she lives home alone, typically is independent with ADLs, and ambualtion using RWx , but her son has been providing necessary care recently. Pt this date demonstrates impaired mobility, requiring MIN A x2 for transfers to standing and to bedside chair. Pt is well below baseline and not safe to return home, recommendation is SNF at d/c.      Anticipated Discharge Disposition (PT): skilled nursing facility

## 2024-06-08 NOTE — PROGRESS NOTES
LOS: 2 days   Patient Care Team:  Jonathan Luna APRN as PCP - General (Family Medicine)  Jak Gavin MD as Cardiologist (Cardiology)    Subjective     Patient c/o of diarrhea and pain on skin with cleaning    Review of Systems   Constitutional:  Positive for activity change, appetite change and fatigue.   HENT: Negative.     Respiratory: Negative.     Cardiovascular: Negative.    Gastrointestinal:  Positive for diarrhea.   Genitourinary: Negative.    Musculoskeletal: Negative.    Neurological:  Positive for weakness.   Psychiatric/Behavioral: Negative.             Objective     Vital Signs  Temp:  [97.5 °F (36.4 °C)-98 °F (36.7 °C)] 97.7 °F (36.5 °C)  Heart Rate:  [] 90  Resp:  [20-28] 28  BP: (114-144)/(53-89) 128/89      Physical Exam  Vitals reviewed.   Constitutional:       Appearance: She is ill-appearing.   HENT:      Head: Normocephalic and atraumatic.      Right Ear: External ear normal.      Left Ear: External ear normal.      Nose: Nose normal.      Mouth/Throat:      Mouth: Mucous membranes are dry.   Eyes:      General:         Right eye: No discharge.         Left eye: No discharge.   Cardiovascular:      Rate and Rhythm: Normal rate and regular rhythm.      Pulses: Normal pulses.      Heart sounds: Normal heart sounds.   Pulmonary:      Effort: Pulmonary effort is normal.      Breath sounds: Normal breath sounds.   Abdominal:      General: Bowel sounds are normal.      Palpations: Abdomen is soft.   Musculoskeletal:         General: Normal range of motion.      Cervical back: Normal range of motion.   Skin:     General: Skin is warm.   Neurological:      Mental Status: She is alert.   Psychiatric:         Behavior: Behavior normal.              Results Review:    Lab Results (last 24 hours)       Procedure Component Value Units Date/Time    CBC & Differential [[id number]]  (Abnormal) Collected: 06/08/24 0352    Specimen: Blood Updated: 06/08/24 0426    Narrative:      The following  orders were created for panel order CBC & Differential.  Procedure                               Abnormality         Status                     ---------                               -----------         ------                     CBC Auto Differential[[id number]]        Abnormal            Final result               Scan Slide[[id number]]                                       Final result                 Please view results for these tests on the individual orders.    CBC Auto Differential [[id number]]  (Abnormal) Collected: 06/08/24 0352    Specimen: Blood Updated: 06/08/24 0426     WBC 15.73 10*3/mm3      RBC 3.77 10*6/mm3      Hemoglobin 10.8 g/dL      Hematocrit 35.1 %      MCV 93.1 fL      MCH 28.6 pg      MCHC 30.8 g/dL      RDW 16.2 %      RDW-SD 55.6 fl      MPV 10.0 fL      Platelets 205 10*3/mm3     Narrative:      The previously reported component NRBC is no longer being reported. Previous result was 0.1 /100 WBC (Reference Range: 0.0-0.2 /100 WBC) on 6/8/2024 at [facility].    Scan Slide [[id number]] Collected: 06/08/24 0352    Specimen: Blood Updated: 06/08/24 0426     Scan Slide --     Comment: See Manual Differential Results       Manual Differential [[id number]]  (Abnormal) Collected: 06/08/24 0352    Specimen: Blood Updated: 06/08/24 0426     Neutrophil % 88.0 %      Lymphocyte % 2.0 %      Monocyte % 2.0 %      Eosinophil % 1.0 %      Bands %  7.0 %      Neutrophils Absolute 14.94 10*3/mm3      Lymphocytes Absolute 0.31 10*3/mm3      Monocytes Absolute 0.31 10*3/mm3      Eosinophils Absolute 0.16 10*3/mm3      Anisocytosis Slight/1+     Elliptocytes Slight/1+     Poikilocytes Slight/1+     WBC Morphology Normal     Platelet Estimate Adequate    Magnesium [[id number]]  (Normal) Collected: 06/08/24 0352    Specimen: Blood Updated: 06/08/24 0414     Magnesium 2.0 mg/dL     Comprehensive Metabolic Panel [[id number]]  (Abnormal) Collected: 06/08/24 0352    Specimen: Blood Updated: 06/08/24 0414     Glucose 121  mg/dL      BUN 48 mg/dL      Creatinine 1.18 mg/dL      Sodium 132 mmol/L      Potassium 3.2 mmol/L      Chloride 94 mmol/L      CO2 27.3 mmol/L      Calcium 9.3 mg/dL      Total Protein 5.8 g/dL      Albumin 3.2 g/dL      ALT (SGPT) 8 U/L      AST (SGOT) 13 U/L      Alkaline Phosphatase 77 U/L      Total Bilirubin 0.5 mg/dL      Globulin 2.6 gm/dL      A/G Ratio 1.2 g/dL      BUN/Creatinine Ratio 40.7     Anion Gap 10.7 mmol/L      eGFR 47.7 mL/min/1.73     Narrative:      GFR Normal >60  Chronic Kidney Disease <60  Kidney Failure <15    The GFR formula is only valid for adults with stable renal function between ages 18 and 70.    Phosphorus [[id number]]  (Abnormal) Collected: 06/08/24 0352    Specimen: Blood Updated: 06/08/24 0413     Phosphorus 2.1 mg/dL     Potassium [[id number]]  (Normal) Collected: 06/07/24 1506    Specimen: Blood from Arm, Left Updated: 06/07/24 1557     Potassium 3.8 mmol/L      Comment: Specimen hemolyzed.  Result may be falsely elevated.       Blood Culture - Blood, Arm, Left [[id number]]  (Normal) Collected: 06/05/24 1223    Specimen: Blood from Arm, Left Updated: 06/07/24 1245     Blood Culture No growth at 2 days    Urine Culture - Urine, Straight Cath [[id number]]  (Abnormal)  (Susceptibility) Collected: 06/05/24 1224    Specimen: Urine from Straight Cath Updated: 06/07/24 1039     Urine Culture >100,000 CFU/mL Escherichia coli ESBL     Comment:   Consider infectious disease consult.  Susceptibility results may not correlate to clinical outcomes.       Narrative:      Colonization of the urinary tract without infection is common. Treatment is discouraged unless the patient is symptomatic, pregnant, or undergoing an invasive urologic procedure.  Recent outcomes data supports the use of pip/tazo in the treatment of susceptible ESBL infections for uncomplicated UTI. Consider use of pip/tazo as a carbapenem-sparing regimen in applicable patients.    Susceptibility        Escherichia coli ESBL       ROBERT      Ertapenem Susceptible      Gentamicin Susceptible      Levofloxacin Resistant      Meropenem Susceptible      Nitrofurantoin Susceptible      Piperacillin + Tazobactam Susceptible      Trimethoprim + Sulfamethoxazole Resistant                           CANDIDA AURIS PCR - Swab, Axilla Right, Axilla Left and Groin [[id number]]  (Normal) Collected: 06/06/24 0848    Specimen: Swab from Axilla Right, Axilla Left and Groin Updated: 06/07/24 0948     CANDIDA AURIS PCR Not Detected             Imaging Results (Last 24 Hours)       ** No results found for the last 24 hours. **                 I reviewed the patient's new clinical results.    Medication Review:   Scheduled Meds:apixaban, 5 mg, Oral, Q12H  dilTIAZem, 30 mg, Oral, Q8H  DULoxetine, 40 mg, Oral, Daily  famotidine, 20 mg, Intravenous, Daily  ferrous sulfate, 324 mg, Oral, Daily With Breakfast  levothyroxine, 50 mcg, Oral, Q AM  meropenem, 1,000 mg, Intravenous, Q8H  metoprolol succinate XL, 50 mg, Oral, Daily  predniSONE, 5 mg, Oral, Daily  saccharomyces boulardii, 250 mg, Oral, BID  sodium chloride, 10 mL, Intravenous, Q12H  tacrolimus, 1 mg, Oral, BID  vancomycin, 250 mg, Oral, Q6H      Continuous Infusions:dilTIAZem, 5-15 mg/hr, Last Rate: Stopped (06/07/24 0849)  Pharmacy Consult,       PRN Meds:.  acetaminophen    Calcium Replacement - Follow Nurse / BPA Driven Protocol    carboxymethylcellulose sod    diazePAM    loperamide    Magnesium Standard Dose Replacement - Follow Nurse / BPA Driven Protocol    melatonin    ondansetron    Pharmacy Consult    Phosphorus Replacement - Follow Nurse / BPA Driven Protocol    Potassium Replacement - Follow Nurse / BPA Driven Protocol    [COMPLETED] Insert Peripheral IV **AND** sodium chloride    sodium chloride    sodium chloride     Interval History:    Assessment & Plan     UTI (urinary tract infection)  -Urine and blood cultures now show E. coli; -  - ID following     C. difficile colitis-starting oral  vancomycin  History of renal transplant-tacrolimus level is pending  Hypokalemia-replacing  Hypophosphatemia-replacing  Hypothyroidism-levothyroxine  Immunocompromised  Acute kidney injury-creatinine trending down towards baseline.  Diuretics are on hold.  Paroxysmal atrial fibs-currently in sinus rhythm; anticoagulated with apixaban.  Continue metoprolol    Plan for disposition:BILL Archer, APRN  06/08/24  08:56 EDT

## 2024-06-08 NOTE — THERAPY EVALUATION
Patient Name: Jaja Carcamo  : 1945    MRN: [id number]                              Today's Date: 2024       Admit Date: 2024    Visit Dx:     ICD-10-CM ICD-9-CM   1. Urinary tract infection without hematuria, site unspecified  N39.0 599.0   2. Sepsis, due to unspecified organism, unspecified whether acute organ dysfunction present  A41.9 038.9     995.91   3. Renal insufficiency  N28.9 593.9     Patient Active Problem List   Diagnosis    COPD (chronic obstructive pulmonary disease)    Acute UTI    Atrial fibrillation with rapid ventricular response    Volume overload    Fall    Hypothyroidism, unspecified    Hyperlipidemia    History of suicide attempt    History of lobectomy of lung    Irritable bowel syndrome    History of kidney transplant    Long term (current) use of immunosuppressive biologic    Long term current use of anticoagulant therapy    Menopausal flushing    Mitral valve regurgitation    Tricuspid valve regurgitation    History of lung cancer    Nonobstructive atherosclerosis of coronary artery    Obsessive-compulsive disorder    Osteoarthritis of left hip    Primary osteoarthritis of right hip    Paroxysmal supraventricular tachycardia    Peripheral neuropathy    History of DVT (deep vein thrombosis)    Personal history of peptic ulcer disease    Pulmonary hypertension    Seasonal allergies    History of repair of hip joint    Gout    Gastroesophageal reflux disease    Hearing loss    Edema of both lower extremities due to peripheral venous insufficiency    Mixed anxiety and depressive disorder    Chronic pain    Chronic hypoxemic respiratory failure    Chronic diarrhea    Asthma    Osteoarthritis    Allergic rhinitis    Wegener's granulomatosis with renal involvement    Essential (primary) hypertension    Paroxysmal atrial fibrillation    Valvular heart disease    Acute exacerbation of CHF (congestive heart failure)    UTI (urinary tract infection)    C. difficile colitis     Past  Medical History:   Diagnosis Date    Allergic rhinitis 04/14/2015    Asthma 08/10/2017    Atrial flutter 05/11/2017    Chronic diarrhea 04/15/2019    Chronic hypoxemic respiratory failure 01/18/2024    Chronic pain 02/03/2015    COPD (chronic obstructive pulmonary disease)     COVID-19 virus detected 08/11/2022    Cytokine release syndrome, grade 1 08/16/2022    Edema of both lower extremities due to peripheral venous insufficiency 03/12/2021    ESRD on hemodialysis 05/22/2013    Essential (primary) hypertension 03/03/2022    Fracture of ulnar styloid 05/19/2022    Fracture of unspecified carpal bone, right wrist, subsequent encounter for fracture with routine healing 03/03/2022    Gastroesophageal reflux disease 11/12/2020    Gout 08/10/2017    Hearing loss 08/10/2017    History of appendectomy     History of DVT (deep vein thrombosis) 11/12/2020    History of kidney transplant 06/30/2017    History of lobectomy of lung 01/18/2024 03/08/2016:  RIGHT Lower Lobe Mass--> Right Video-assisted thoracoscopy with a moderate-to-large wedge resection of the RLL (by Dr. Haris Mcconnell @ [facility])--> Poorly differentiated carcinoma of the RLL.      History of repair of hip joint 05/21/2013    History of suicide attempt 05/20/2024    Hyperlipidemia 08/11/2014    Hypothyroidism, unspecified 03/03/2022    Infection due to extended spectrum beta lactamase (ESBL) producing bacteria 03/11/2016    No A2K system hx. +ESBL E coli urine on 3/11/16.      Irritable bowel syndrome 04/15/2019    Long term (current) use of immunosuppressive biologic 04/28/2021    Long term current use of anticoagulant therapy 01/18/2024    Malignant neoplasm of lung 08/10/2017    Menopausal flushing 08/30/2021    Mitral valve regurgitation 07/06/2015    Mixed anxiety and depressive disorder 04/30/2015    Non-small cell lung cancer 08/10/2017    Nonobstructive atherosclerosis of coronary artery 06/02/2019 08/12/2022: CATH: [facility]: NSTEMI  assoc with Covid-19: LM:-nl;  LAD: diffuse, Ca++; 30%; CIRC: Dominant. Normal; RCA: small; 50% diffuse, proximal and mid-segment.      NSTEMI (non-ST elevated myocardial infarction) 08/11/2022    Obsessive-compulsive disorder 04/15/2019    Osteoarthritis 05/20/2024    Paroxysmal atrial fibrillation 05/11/2017    Paroxysmal supraventricular tachycardia 05/11/2017    Peripheral neuropathy 08/11/2014    Personal history of peptic ulcer disease 11/12/2020    Postoperative anemia due to acute blood loss 05/22/2013    Right wrist fracture 03/01/2022    Seasonal allergies 08/10/2017    Secondary hyperparathyroidism of renal origin 09/14/2015    Tricuspid valve regurgitation 03/15/2017    Ulcer of lower extremity 08/30/2021    Unspecified acute appendicitis 03/03/2022    Valvular heart disease 05/20/2024    Wegener's granulomatosis with renal involvement 03/03/2022     Past Surgical History:   Procedure Laterality Date    APPENDECTOMY      BREAST SURGERY Left     cysts rmeoved    CARDIAC CATHETERIZATION Right 08/12/2022    Procedure: Left Heart Cath and coronary angiogram;  Surgeon: Jak Gavin MD;  Location: [facility] CATH INVASIVE LOCATION;  Service: Cardiology;  Laterality: Right;    CLOSED REDUCTION WRIST FRACTURE Right 03/01/2022    Procedure: WRIST CLOSED REDUCTION;  Surgeon: Gaudencio Townsend MD;  Location: [facility] MAIN OR;  Service: Orthopedics;  Laterality: Right;    CYSTOSCOPY      HIP ARTHROPLASTY      HYSTERECTOMY      LUNG LOBECTOMY Right     TRANSPLANTATION RENAL        General Information       Row Name 06/08/24 1816          Physical Therapy Time and Intention    Document Type evaluation  -EL     Mode of Treatment individual therapy;physical therapy  -EL       Row Name 06/08/24 1816          General Information    Prior Level of Function independent:  son provides assistance as needed.  -EL     Existing Precautions/Restrictions fall  -EL     Barriers to Rehab medically complex  -EL       Row Name 06/08/24 9793           Home Main Entrance    Number of Stairs, Main Entrance three  -EL     Stair Railings, Main Entrance railings safe and in good condition  -       Row Name 06/08/24 1816          Stairs Within Home, Primary    Number of Stairs, Within Home, Primary none  -[facility] Name 06/08/24 1816          Cognition    Orientation Status (Cognition) oriented x 4  -[facility] Name 06/08/24 1816          Safety Issues, Functional Mobility    Impairments Affecting Function (Mobility) balance;strength;pain;endurance/activity tolerance  -               User Key  (r) = Recorded By, (t) = Taken By, (c) = Cosigned By      Initials Name Provider Type    Max Jarvis, PT Physical Therapist                   Mobility       Row Name 06/08/24 1818          Bed Mobility    Bed Mobility supine-sit  -EL     Supine-Sit [place] (Bed Mobility) minimum assist (75% patient effort)  -EL     Assistive Device (Bed Mobility) bed rails;head of bed elevated  -[facility] Name 06/08/24 1818          Bed-Chair Transfer    Bed-Chair [place] (Transfers) 2 person assist;minimum assist (75% patient effort);moderate assist (50% patient effort)  -EL     Comment, (Bed-Chair Transfer) will benefit from RWx usage.  -[facility] Name 06/08/24 1818          Sit-Stand Transfer    Sit-Stand [place] (Transfers) minimum assist (75% patient effort);2 person assist  -               User Key  (r) = Recorded By, (t) = Taken By, (c) = Cosigned By      Initials Name Provider Type    Max Jarvis, PT Physical Therapist                   Obj/Interventions       [facility] Name 06/08/24 1819          Range of Motion Comprehensive    General Range of Motion bilateral lower extremity ROM WFL  -[facility] Name 06/08/24 1819          Strength Comprehensive (MMT)    General Manual Muscle Testing (MMT) Assessment lower extremity strength deficits identified  -     Comment, General Manual Muscle Testing (MMT) Assessment BLE 3/5 gross  -[facility] Name 06/08/24  1819          Sensory Assessment (Somatosensory)    Sensory Assessment (Somatosensory) sensation intact  -EL               User Key  (r) = Recorded By, (t) = Taken By, (c) = Cosigned By      Initials Name Provider Type    Max Jarvis PT Physical Therapist                   Goals/Plan       Row Name 06/08/24 1825          Bed Mobility Goal 1 (PT)    Activity/Assistive Device (Bed Mobility Goal 1, PT) bed mobility activities, all  -EL     [place] Level/Cues Needed (Bed Mobility Goal 1, PT) modified independence  -EL     Time Frame (Bed Mobility Goal 1, PT) long term goal (LTG);2 weeks  -EL       Row Name 06/08/24 1825          Transfer Goal 1 (PT)    Activity/Assistive Device (Transfer Goal 1, PT) transfers, all;walker, rolling  -EL     [place] Level/Cues Needed (Transfer Goal 1, PT) supervision required  -EL     Time Frame (Transfer Goal 1, PT) long term goal (LTG);2 weeks  -EL       Row Name 06/08/24 1825          Gait Training Goal 1 (PT)    Activity/Assistive Device (Gait Training Goal 1, PT) gait (walking locomotion);walker, rolling  -EL     [place] Level (Gait Training Goal 1, PT) supervision required  -EL     Distance (Gait Training Goal 1, PT) 50  -EL     Time Frame (Gait Training Goal 1, PT) long term goal (LTG);2 weeks  -EL       Row Name 06/08/24 1825          Therapy Assessment/Plan (PT)    Planned Therapy Interventions (PT) neuromuscular re-education;balance training;bed mobility training;transfer training;gait training;patient/family education;strengthening  -EL               User Key  (r) = Recorded By, (t) = Taken By, (c) = Cosigned By      Initials Name Provider Type    Max Jarvis PT Physical Therapist                   Clinical Impression       Row Name 06/08/24 1820          Pain    Pretreatment Pain Rating 3/10  -EL     Posttreatment Pain Rating 3/10  -EL     Pain Location generalized  -EL       Row Name 06/08/24 1820          Plan of Care Review    Plan of Care Reviewed With  patient  -EL     Outcome Evaluation Pt is a 78 YO F with a h/o chronic HFpEF, atrial fibrillation, COPD, non-obstructive CAD, asthma, HTN, kidney transplant and lung cancer who presented to the ER with shortness of breath and weakness. Pt has had multiple hospitalizations within the last month, as well as falls and has been at SNF. Pt with CDiff this date and has FMS currently. Pt states she lives home alone, typically is independent with ADLs, and ambualtion using RWx , but her son has been providing necessary care recently. Pt this date demonstrates impaired mobility, requiring MIN A x2 for transfers to standing and to bedside chair. Pt is well below baseline and not safe to return home, recommendation is SNF at d/c.  -EL       Row Name 06/08/24 1820          Therapy Assessment/Plan (PT)    Rehab Potential (PT) good, to achieve stated therapy goals  -EL     Criteria for Skilled Interventions Met (PT) yes  -EL     Therapy Frequency (PT) 5 times/wk  -EL     Predicted Duration of Therapy Intervention (PT) until d/c  -EL       Row Name 06/08/24 1820          Vital Signs    O2 Delivery Pre Treatment room air  -EL     O2 Delivery Intra Treatment room air  -EL     O2 Delivery Post Treatment room air  -EL     Pre Patient Position Supine  -EL     Intra Patient Position Standing  -EL     Post Patient Position Sitting  -EL       Row Name 06/08/24 1820          Positioning and Restraints    Pre-Treatment Position in bed  -EL     Post Treatment Position chair  -EL     In Chair notified nsg;reclined;call light within reach;encouraged to call for assist;exit alarm on  -EL               User Key  (r) = Recorded By, (t) = Taken By, (c) = Cosigned By      Initials Name Provider Type    Max Jarvis, PT Physical Therapist                   Outcome Measures       Row Name 06/08/24 1826 06/08/24 1605       How much help from another person do you currently need...    Turning from your back to your side while in flat bed without using  bedrails? 3  -EL 3  -MH    Moving from lying on back to sitting on the side of a flat bed without bedrails? 2  -EL --    Moving to and from a bed to a chair (including a wheelchair)? 2  -EL --    Standing up from a chair using your arms (e.g., wheelchair, bedside chair)? 2  -EL --    Climbing 3-5 steps with a railing? 1  -EL --    To walk in hospital room? 1  -EL --    AM-PAC 6 Clicks Score (PT) 11  -EL --    Highest Level of Mobility Goal 4 --> Transfer to chair/commode  -EL --      Row Name 06/08/24 0815          How much help from another person do you currently need...    Turning from your back to your side while in flat bed without using bedrails? 3  -GK     Moving from lying on back to sitting on the side of a flat bed without bedrails? 3  -GK     Moving to and from a bed to a chair (including a wheelchair)? 3  -GK     Standing up from a chair using your arms (e.g., wheelchair, bedside chair)? 3  -GK     Climbing 3-5 steps with a railing? 1  -GK     To walk in hospital room? 1  -GK     AM-PAC 6 Clicks Score (PT) 14  -GK     Highest Level of Mobility Goal 4 --> Transfer to chair/commode  -GK       Row Name 06/08/24 1826          Functional Assessment    Outcome Measure Options AM-PAC 6 Clicks Basic Mobility (PT)  -EL               User Key  (r) = Recorded By, (t) = Taken By, (c) = Cosigned By      Initials Name Provider Type     Theresa Lazaro, OT Occupational Therapist    Max Jarvis, PT Physical Therapist    Amy Brown, RN Registered Nurse                                 Physical Therapy Education       Title: PT OT SLP Therapies (In Progress)       Topic: Physical Therapy (Done)       Point: Mobility training (Done)       Learning Progress Summary             Patient Acceptance, E,TB, VU by JESSI at 6/8/2024 1826                         Point: Precautions (Done)       Learning Progress Summary             Patient Acceptance, E,TB, VU by JESSI at 6/8/2024 1826                                          User Key       Initials Effective Dates Name Provider Type Discipline    EL 06/23/20 -  Max Chong, PT Physical Therapist PT                  PT Recommendation and Plan  Planned Therapy Interventions (PT): neuromuscular re-education, balance training, bed mobility training, transfer training, gait training, patient/family education, strengthening  Plan of Care Reviewed With: patient  Outcome Evaluation: Pt is a 78 YO F with a h/o chronic HFpEF, atrial fibrillation, COPD, non-obstructive CAD, asthma, HTN, kidney transplant and lung cancer who presented to the ER with shortness of breath and weakness. Pt has had multiple hospitalizations within the last month, as well as falls and has been at SNF. Pt with CDiff this date and has FMS currently. Pt states she lives home alone, typically is independent with ADLs, and ambualtion using RWx , but her son has been providing necessary care recently. Pt this date demonstrates impaired mobility, requiring MIN A x2 for transfers to standing and to bedside chair. Pt is well below baseline and not safe to return home, recommendation is SNF at d/c.     Time Calculation:   PT Evaluation Complexity  History, PT Evaluation Complexity: 1-2 personal factors and/or comorbidities  Examination of Body Systems (PT Eval Complexity): total of 3 or more elements  Clinical Presentation (PT Evaluation Complexity): evolving  Clinical Decision Making (PT Evaluation Complexity): moderate complexity  Overall Complexity (PT Evaluation Complexity): moderate complexity     PT Charges       Row Name 06/08/24 1834             Time Calculation    Start Time 1319  -EL      Stop Time 1340  -EL      Time Calculation (min) 21 min  -EL      PT Received On 06/08/24  -EL      PT - Next Appointment 06/10/24  -EL      PT Goal Re-Cert Due Date 06/22/24  -EL                User Key  (r) = Recorded By, (t) = Taken By, (c) = Cosigned By      Initials Name Provider Type    EL Max Chong, PT Physical Therapist                   Therapy Charges for Today       Code Description Service Date Service Provider Modifiers Qty    23128705982 HC PT EVAL MOD COMPLEXITY 4 6/8/2024 Max Chong, PT GP 1            PT G-Codes  Outcome Measure Options: AM-PAC 6 Clicks Basic Mobility (PT)  AM-PAC 6 Clicks Score (PT): 11  PT Discharge Summary  Anticipated Discharge Disposition (PT): skilled nursing facility    Max Chong PT  6/8/2024

## 2024-06-09 LAB
ALBUMIN SERPL-MCNC: 3.2 G/DL (ref 3.5–5.2)
ALBUMIN/GLOB SERPL: 1.1 G/DL
ALP SERPL-CCNC: 76 U/L (ref 39–117)
ALT SERPL W P-5'-P-CCNC: 7 U/L (ref 1–33)
ANION GAP SERPL CALCULATED.3IONS-SCNC: 6.7 MMOL/L (ref 5–15)
AST SERPL-CCNC: 11 U/L (ref 1–32)
BACTERIA SPEC AEROBE CULT: ABNORMAL
BASOPHILS # BLD AUTO: 0.03 10*3/MM3 (ref 0–0.2)
BASOPHILS NFR BLD AUTO: 0.3 % (ref 0–1.5)
BILIRUB SERPL-MCNC: 0.4 MG/DL (ref 0–1.2)
BUN SERPL-MCNC: 45 MG/DL (ref 8–23)
BUN/CREAT SERPL: 36.6 (ref 7–25)
CALCIUM SPEC-SCNC: 9.5 MG/DL (ref 8.6–10.5)
CHLORIDE SERPL-SCNC: 95 MMOL/L (ref 98–107)
CO2 SERPL-SCNC: 28.3 MMOL/L (ref 22–29)
CREAT SERPL-MCNC: 1.23 MG/DL (ref 0.57–1)
DEPRECATED RDW RBC AUTO: 57.1 FL (ref 37–54)
EGFRCR SERPLBLD CKD-EPI 2021: 45.4 ML/MIN/1.73
EOSINOPHIL # BLD AUTO: 0.23 10*3/MM3 (ref 0–0.4)
EOSINOPHIL NFR BLD AUTO: 1.9 % (ref 0.3–6.2)
ERYTHROCYTE [DISTWIDTH] IN BLOOD BY AUTOMATED COUNT: 16.2 % (ref 12.3–15.4)
GLOBULIN UR ELPH-MCNC: 2.8 GM/DL
GLUCOSE SERPL-MCNC: 120 MG/DL (ref 65–99)
GRAM STN SPEC: ABNORMAL
HCT VFR BLD AUTO: 37.2 % (ref 34–46.6)
HGB BLD-MCNC: 11.2 G/DL (ref 12–15.9)
IMM GRANULOCYTES # BLD AUTO: 0.08 10*3/MM3 (ref 0–0.05)
IMM GRANULOCYTES NFR BLD AUTO: 0.7 % (ref 0–0.5)
ISOLATED FROM: ABNORMAL
LYMPHOCYTES # BLD AUTO: 0.56 10*3/MM3 (ref 0.7–3.1)
LYMPHOCYTES NFR BLD AUTO: 4.7 % (ref 19.6–45.3)
MCH RBC QN AUTO: 28.4 PG (ref 26.6–33)
MCHC RBC AUTO-ENTMCNC: 30.1 G/DL (ref 31.5–35.7)
MCV RBC AUTO: 94.4 FL (ref 79–97)
MONOCYTES # BLD AUTO: 0.64 10*3/MM3 (ref 0.1–0.9)
MONOCYTES NFR BLD AUTO: 5.4 % (ref 5–12)
NEUTROPHILS NFR BLD AUTO: 10.41 10*3/MM3 (ref 1.7–7)
NEUTROPHILS NFR BLD AUTO: 87 % (ref 42.7–76)
NRBC BLD AUTO-RTO: 0 /100 WBC (ref 0–0.2)
PLATELET # BLD AUTO: 201 10*3/MM3 (ref 140–450)
PMV BLD AUTO: 10.5 FL (ref 6–12)
POTASSIUM SERPL-SCNC: 4.4 MMOL/L (ref 3.5–5.2)
PROT SERPL-MCNC: 6 G/DL (ref 6–8.5)
RBC # BLD AUTO: 3.94 10*6/MM3 (ref 3.77–5.28)
SODIUM SERPL-SCNC: 130 MMOL/L (ref 136–145)
WBC NRBC COR # BLD AUTO: 11.95 10*3/MM3 (ref 3.4–10.8)

## 2024-06-09 PROCEDURE — 85025 COMPLETE CBC W/AUTO DIFF WBC: CPT | Performed by: INTERNAL MEDICINE

## 2024-06-09 PROCEDURE — 25010000002 MEROPENEM PER 100 MG: Performed by: INTERNAL MEDICINE

## 2024-06-09 PROCEDURE — 80197 ASSAY OF TACROLIMUS: CPT | Performed by: INTERNAL MEDICINE

## 2024-06-09 PROCEDURE — 63710000001 PREDNISONE PER 5 MG

## 2024-06-09 PROCEDURE — 63710000001 TACROLIMUS PER 1 MG

## 2024-06-09 PROCEDURE — 25010000002 MEROPENEM PER 100 MG: Performed by: NURSE PRACTITIONER

## 2024-06-09 PROCEDURE — 80053 COMPREHEN METABOLIC PANEL: CPT | Performed by: INTERNAL MEDICINE

## 2024-06-09 RX ADMIN — FAMOTIDINE 20 MG: 20 TABLET, FILM COATED ORAL at 10:53

## 2024-06-09 RX ADMIN — DILTIAZEM HYDROCHLORIDE 30 MG: 30 TABLET, FILM COATED ORAL at 21:06

## 2024-06-09 RX ADMIN — METOPROLOL SUCCINATE 50 MG: 50 TABLET, EXTENDED RELEASE ORAL at 10:53

## 2024-06-09 RX ADMIN — Medication 5 MG: at 21:07

## 2024-06-09 RX ADMIN — Medication 250 MG: at 10:53

## 2024-06-09 RX ADMIN — TACROLIMUS 1 MG: 1 CAPSULE ORAL at 21:06

## 2024-06-09 RX ADMIN — DULOXETINE HYDROCHLORIDE 40 MG: 20 CAPSULE, DELAYED RELEASE ORAL at 10:53

## 2024-06-09 RX ADMIN — FERROUS SULFATE TAB EC 324 MG (65 MG FE EQUIVALENT) 324 MG: 324 (65 FE) TABLET DELAYED RESPONSE at 10:53

## 2024-06-09 RX ADMIN — TACROLIMUS 1 MG: 1 CAPSULE ORAL at 10:53

## 2024-06-09 RX ADMIN — APIXABAN 5 MG: 5 TABLET, FILM COATED ORAL at 10:53

## 2024-06-09 RX ADMIN — VANCOMYCIN HYDROCHLORIDE 250 MG: 250 CAPSULE ORAL at 17:09

## 2024-06-09 RX ADMIN — DIAZEPAM 5 MG: 5 TABLET ORAL at 21:07

## 2024-06-09 RX ADMIN — ACETAMINOPHEN 650 MG: 325 TABLET, FILM COATED ORAL at 10:59

## 2024-06-09 RX ADMIN — ACETAMINOPHEN 650 MG: 325 TABLET, FILM COATED ORAL at 06:38

## 2024-06-09 RX ADMIN — LEVOTHYROXINE SODIUM 50 MCG: 0.05 TABLET ORAL at 05:54

## 2024-06-09 RX ADMIN — Medication 10 ML: at 10:55

## 2024-06-09 RX ADMIN — VANCOMYCIN HYDROCHLORIDE 250 MG: 250 CAPSULE ORAL at 11:00

## 2024-06-09 RX ADMIN — PREDNISONE 5 MG: 5 TABLET ORAL at 10:53

## 2024-06-09 RX ADMIN — MEROPENEM 1000 MG: 1 INJECTION, POWDER, FOR SOLUTION INTRAVENOUS at 17:09

## 2024-06-09 RX ADMIN — APIXABAN 5 MG: 5 TABLET, FILM COATED ORAL at 21:06

## 2024-06-09 RX ADMIN — MEROPENEM 1000 MG: 1 INJECTION, POWDER, FOR SOLUTION INTRAVENOUS at 05:54

## 2024-06-09 RX ADMIN — Medication 250 MG: at 21:07

## 2024-06-09 RX ADMIN — DILTIAZEM HYDROCHLORIDE 30 MG: 30 TABLET, FILM COATED ORAL at 14:37

## 2024-06-09 RX ADMIN — DILTIAZEM HYDROCHLORIDE 30 MG: 30 TABLET, FILM COATED ORAL at 05:54

## 2024-06-09 RX ADMIN — Medication 10 ML: at 21:07

## 2024-06-09 RX ADMIN — VANCOMYCIN HYDROCHLORIDE 250 MG: 250 CAPSULE ORAL at 05:54

## 2024-06-09 NOTE — PROGRESS NOTES
Infectious Diseases Progress Note      LOS: 3 days   Patient Care Team:  Jonathan Luna APRN as PCP - General (Family Medicine)  Jak Gavin MD as Cardiologist (Cardiology)    Chief Complaint: Diarrhea    Subjective     The had no fever during the last 24 hours.  Remained hemodynamically stable requiring no vasopressors.  Continues to have diarrhea and required fecal management system.  Denies significant abdominal pain nausea or vomiting.  Currently on 3 L of oxygen by nasal cannula    Review of Systems:   Review of Systems   Constitutional: Negative.    HENT: Negative.     Eyes: Negative.    Respiratory: Negative.     Cardiovascular: Negative.    Gastrointestinal:  Positive for diarrhea.   Genitourinary: Negative.    Musculoskeletal: Negative.    Skin: Negative.    Neurological: Negative.    Hematological: Negative.    Psychiatric/Behavioral: Negative.          Objective     Vital Signs  Temp:  [97.5 °F (36.4 °C)-97.8 °F (36.6 °C)] 97.8 °F (36.6 °C)  Heart Rate:  [88-93] 88  Resp:  [17-24] 21  BP: (114-133)/(50-60) 133/60    Physical Exam:  Physical Exam  Vitals and nursing note reviewed.   Constitutional:       Appearance: She is well-developed. She is ill-appearing.   HENT:      Head: Normocephalic and atraumatic.   Eyes:      Pupils: Pupils are equal, round, and reactive to light.   Cardiovascular:      Rate and Rhythm: Normal rate and regular rhythm.      Heart sounds: Normal heart sounds.   Pulmonary:      Effort: Pulmonary effort is normal. No respiratory distress.      Breath sounds: Normal breath sounds. No wheezing or rales.   Abdominal:      General: Bowel sounds are normal. There is no distension.      Palpations: Abdomen is soft. There is no mass.      Tenderness: There is abdominal tenderness. There is no guarding or rebound.   Musculoskeletal:         General: No deformity. Normal range of motion.      Cervical back: Normal range of motion and neck supple.   Skin:     General: Skin is warm.       Findings: No erythema or rash.   Neurological:      Mental Status: She is alert and oriented to person, place, and time.      Cranial Nerves: No cranial nerve deficit.          Results Review:    I have reviewed all clinical data, test, lab, and imaging results.     Radiology  No Radiology Exams Resulted Within Past 24 Hours    Cardiology    Laboratory    Results from last 7 days   Lab Units 06/09/24  0423 06/08/24  0352 06/07/24  0522 06/06/24  0235 06/05/24  1223 06/03/24  0929   WBC 10*3/mm3 11.95* 15.73* 14.63* 19.84* 23.12* 13.71*   HEMOGLOBIN g/dL 11.2* 10.8* 10.6* 13.5 13.4 10.7*   HEMATOCRIT % 37.2 35.1 34.9 43.6 43.5 35.2   PLATELETS 10*3/mm3 201 205 199 229 271 222     Results from last 7 days   Lab Units 06/09/24  0423 06/08/24  1354 06/08/24  0352 06/07/24  1506 06/07/24  0522 06/06/24  0235 06/05/24  1223 06/03/24  0929   SODIUM mmol/L 130*  --  132*  --  131* 139 134* 135*   POTASSIUM mmol/L 4.4 4.4 3.2* 3.8 2.8* 3.2* 3.1* 3.7   CHLORIDE mmol/L 95*  --  94*  --  92* 93* 87* 98   CO2 mmol/L 28.3  --  27.3  --  28.8 29.6* 28.9 27.7   BUN mg/dL 45*  --  48*  --  42* 47* 50* 27*   CREATININE mg/dL 1.23*  --  1.18*  --  1.04* 1.16* 1.45* 0.76   GLUCOSE mg/dL 120*  --  121*  --  203* 201* 320* 151*   ALBUMIN g/dL 3.2*  --  3.2*  --   --   --  4.4  --    BILIRUBIN mg/dL 0.4  --  0.5  --   --   --  1.4*  --    ALK PHOS U/L 76  --  77  --   --   --  94  --    AST (SGOT) U/L 11  --  13  --   --   --  31  --    ALT (SGPT) U/L 7  --  8  --   --   --  17  --    CALCIUM mg/dL 9.5  --  9.3  --  9.4 10.2 10.3 10.3                 Microbiology   Microbiology Results (last 10 days)       Procedure Component Value - Date/Time    Clostridioides difficile Toxin - Stool, Per Rectum [[id number]]  (Abnormal) Collected: 06/07/24 0438    Lab Status: Final result Specimen: Stool from Per Rectum Updated: 06/07/24 0701    Narrative:      The following orders were created for panel order Clostridioides difficile Toxin - Stool, Per  Rectum.  Procedure                               Abnormality         Status                     ---------                               -----------         ------                     Clostridioides difficile...[[id number]]  Abnormal            Final result                 Please view results for these tests on the individual orders.    Clostridioides difficile EIA - Stool, Per Rectum [[id number]]  (Abnormal) Collected: 06/07/24 0438    Lab Status: Final result Specimen: Stool from Per Rectum Updated: 06/07/24 0701     C Diff GDH Ag Positive     C.diff Toxin Ag Positive    Narrative:      DNA from a toxigenic strain of C.difficile was detected, along with the presence of free toxin. These results are suggestive of C.difficile infection, in context of an appropriate clinical scenario.    CANDIDA AURIS PCR - Swab, Axilla Right, Axilla Left and Groin [[id number]]  (Normal) Collected: 06/06/24 0848    Lab Status: Final result Specimen: Swab from Axilla Right, Axilla Left and Groin Updated: 06/07/24 0948     CANDIDA AURIS PCR Not Detected    Gastrointestinal Panel, PCR - Stool, Per Rectum [[id number]]  (Normal) Collected: 06/05/24 1818    Lab Status: Final result Specimen: Stool from Per Rectum Updated: 06/05/24 2034     Campylobacter Not Detected     Plesiomonas shigelloides Not Detected     Salmonella Not Detected     Vibrio Not Detected     Vibrio cholerae Not Detected     Yersinia enterocolitica Not Detected     Enteroaggregative E. coli (EAEC) Not Detected     Enteropathogenic E. coli (EPEC) Not Detected     Enterotoxigenic E. coli (ETEC) lt/st Not Detected     Shiga-like toxin-producing E. coli (STEC) stx1/stx2 Not Detected     Shigella/Enteroinvasive E. coli (EIEC) Not Detected     Cryptosporidium Not Detected     Cyclospora cayetanensis Not Detected     Entamoeba histolytica Not Detected     Giardia lamblia Not Detected     Adenovirus F40/41 Not Detected     Astrovirus Not Detected     Norovirus GI/GII Not Detected      Rotavirus A Not Detected     Sapovirus (I, II, IV or V) Not Detected    Narrative:      If Aeromonas, Staphylococcus aureus or Bacillus cereus are suspected, please order [id number]: Stool Culture, Aeromonas, S aureus, B Cereus.    Respiratory Panel PCR w/COVID-19(SARS-CoV-2) HODAN/MARIA M/TWILA/PAD/COR/JENISE In-House, NP Swab in UTM/VTM, 2 HR TAT - Swab, Nasopharynx [[id number]]  (Normal) Collected: 06/05/24 1224    Lab Status: Final result Specimen: Swab from Nasopharynx Updated: 06/05/24 1326     ADENOVIRUS, PCR Not Detected     Coronavirus 229E Not Detected     Coronavirus HKU1 Not Detected     Coronavirus NL63 Not Detected     Coronavirus OC43 Not Detected     COVID19 Not Detected     Human Metapneumovirus Not Detected     Human Rhinovirus/Enterovirus Not Detected     Influenza A PCR Not Detected     Influenza B PCR Not Detected     Parainfluenza Virus 1 Not Detected     Parainfluenza Virus 2 Not Detected     Parainfluenza Virus 3 Not Detected     Parainfluenza Virus 4 Not Detected     RSV, PCR Not Detected     Bordetella pertussis pcr Not Detected     Bordetella parapertussis PCR Not Detected     Chlamydophila pneumoniae PCR Not Detected     Mycoplasma pneumo by PCR Not Detected    Narrative:      In the setting of a positive respiratory panel with a viral infection PLUS a negative procalcitonin without other underlying concern for bacterial infection, consider observing off antibiotics or discontinuation of antibiotics and continue supportive care. If the respiratory panel is positive for atypical bacterial infection (Bordetella pertussis, Chlamydophila pneumoniae, or Mycoplasma pneumoniae), consider antibiotic de-escalation to target atypical bacterial infection.    Urine Culture - Urine, Straight Cath [[id number]]  (Abnormal)  (Susceptibility) Collected: 06/05/24 1224    Lab Status: Final result Specimen: Urine from Straight Cath Updated: 06/07/24 1039     Urine Culture >100,000 CFU/mL Escherichia coli ESBL     Comment:    Consider infectious disease consult.  Susceptibility results may not correlate to clinical outcomes.       Narrative:      Colonization of the urinary tract without infection is common. Treatment is discouraged unless the patient is symptomatic, pregnant, or undergoing an invasive urologic procedure.  Recent outcomes data supports the use of pip/tazo in the treatment of susceptible ESBL infections for uncomplicated UTI. Consider use of pip/tazo as a carbapenem-sparing regimen in applicable patients.    Susceptibility        Escherichia coli ESBL      ROBERT      Ertapenem Susceptible      Gentamicin Susceptible      Levofloxacin Resistant      Meropenem Susceptible      Nitrofurantoin Susceptible      Piperacillin + Tazobactam Susceptible      Trimethoprim + Sulfamethoxazole Resistant                           Blood Culture - Blood, Arm, Left [[id number]]  (Normal) Collected: 06/05/24 1223    Lab Status: Preliminary result Specimen: Blood from Arm, Left Updated: 06/09/24 1245     Blood Culture No growth at 4 days    Blood Culture - Blood, Arm, Left [[id number]]  (Abnormal)  (Susceptibility) Collected: 06/05/24 1223    Lab Status: Final result Specimen: Blood from Arm, Left Updated: 06/09/24 0729     Blood Culture Escherichia coli ESBL     Comment:   Consider infectious disease consult.  Susceptibility results may not correlate to clinical outcomes.  For ESBL-producing infections in the blood, a carbapenem is recommended as first-line therapy for optimal clinical outcomes.        Isolated from Aerobic Bottle     Gram Stain Aerobic Bottle Gram negative bacilli    Narrative:      Less than seven (7) mL's of blood was collected.  Insufficient quantity may yield false negative results.    Susceptibility        Escherichia coli ESBL      ROBERT      Ertapenem Susceptible      Levofloxacin Resistant      Meropenem Susceptible      Trimethoprim + Sulfamethoxazole Susceptible                       Susceptibility Comments        Escherichia coli ESBL    Cefazolin sensitivity will not be reported for Enterobacteriaceae in non-urine isolates. If cefazolin is preferred, please call the microbiology lab to request an E-test.  With the exception of urinary-sourced infections, aminoglycosides should not be used as monotherapy.               Blood Culture ID, PCR - Blood, Arm, Left [[id number]]  (Abnormal) Collected: 06/05/24 1223    Lab Status: Final result Specimen: Blood from Arm, Left Updated: 06/07/24 0608     BCID, PCR Escherichia coli. Identification by BCID2 PCR.     BCID, PCR 2 CTX-M (ESBL) detected. Identification by BCID2 PCR     BOTTLE TYPE Aerobic Bottle            Medication Review:       Schedule Meds  apixaban, 5 mg, Oral, Q12H  dilTIAZem, 30 mg, Oral, Q8H  DULoxetine, 40 mg, Oral, Daily  famotidine, 20 mg, Oral, Daily  ferrous sulfate, 324 mg, Oral, Daily With Breakfast  levothyroxine, 50 mcg, Oral, Q AM  meropenem, 1,000 mg, Intravenous, Q12H  metoprolol succinate XL, 50 mg, Oral, Daily  predniSONE, 5 mg, Oral, Daily  saccharomyces boulardii, 250 mg, Oral, BID  sodium chloride, 10 mL, Intravenous, Q12H  tacrolimus, 1 mg, Oral, BID  vancomycin, 250 mg, Oral, Q6H        Infusion Meds  Pharmacy Consult,         PRN Meds    acetaminophen    Calcium Replacement - Follow Nurse / BPA Driven Protocol    carboxymethylcellulose sod    diazePAM    loperamide    Magnesium Standard Dose Replacement - Follow Nurse / BPA Driven Protocol    melatonin    ondansetron    Pharmacy Consult    Phosphorus Replacement - Follow Nurse / BPA Driven Protocol    Potassium Replacement - Follow Nurse / BPA Driven Protocol    [COMPLETED] Insert Peripheral IV **AND** sodium chloride    sodium chloride    sodium chloride        Assessment & Plan       Antimicrobial Therapy   1.  IV meropenem        2.  P.o. vancomycin        3.        4.        5.          Assessment     ESBL  E. coli bacteremia secondary to complicated UTI.  CT scan of abdomen pelvis  showed no acute findings.  T     Complicated UTI with bacteremia.  Urine culture grew ESBL  E. coli.  Patient had recent episode of E. coli/ESBL  UTI and treated with p.o. fosfomycin     C. difficile colitis.  According to patient this is her second episode and she had 1 episode in 2013.  Currently has a fecal management system diarrhea is slowing down     Reactive leukocytosis secondary to above and steroids.  White count remained stable.  Trending down     History of COPD chronically on 4 L of oxygen via nasal cannula.  Currently on 3 L     History of kidney transplant in 2017 secondary to granulomatosis polyangiitis.  Currently on prednisone and Prograf     History of lung cancer in the past     Plan     Continue IV meropenem 1 g every 12 hours for total of 14 days-last day on 6/20/2024  Continue p.o. vancomycin  250 mg every 6 hours.  We will consider 3 weeks of treatment with p.o. vancomycin.  Continue probiotics  Continue supportive care  A.m. labs  Enteric isolation for C. difficile colitis  Contact isolation for ESBL  E. Coli  Appropriate PPE was placed on before entering the room  Discussed with TONYA Soler  06/09/24  16:25 EDT    Note is dictated utilizing voice recognition software/Dragon

## 2024-06-09 NOTE — PROGRESS NOTES
LOS: 3 days   Patient Care Team:  Jonathan Luna APRN as PCP - General (Family Medicine)  Jak Gavin MD as Cardiologist (Cardiology)    Subjective     Interval History: Slowly improving    Patient Complaints: Abdominal cramping has resolved, overall feeling slightly better with no new complaints    History taken from: patient    Review of Systems   Constitutional:  Positive for activity change, appetite change and fatigue. Negative for chills, diaphoresis and fever.   HENT:  Negative for facial swelling.    Eyes:  Negative for visual disturbance.   Respiratory:  Negative for cough, shortness of breath, wheezing and stridor.    Cardiovascular:  Negative for chest pain, palpitations and leg swelling.   Gastrointestinal:  Negative for abdominal pain, constipation, diarrhea, nausea and vomiting.   Endocrine: Negative for polyuria.   Genitourinary:  Negative for dysuria.   Musculoskeletal:  Positive for gait problem. Negative for arthralgias and back pain.   Neurological:  Positive for weakness. Negative for dizziness.   Psychiatric/Behavioral:  Negative for confusion.            Objective     Vital Signs  Temp:  [97.5 °F (36.4 °C)-97.8 °F (36.6 °C)] 97.7 °F (36.5 °C)  Heart Rate:  [83-93] 88  Resp:  [21-24] 22  BP: (114-123)/(53-64) 121/53    Physical Exam:     General Appearance:    Alert, cooperative, in no acute distress, chronically ill-appearing   Head:    Normocephalic, without obvious abnormality, atraumatic   Eyes:            Lids and lashes normal, conjunctivae and sclerae normal, no   icterus, no pallor, corneas clear, PERRLA   Ears:    Ears appear intact with no abnormalities noted   Throat:   No oral lesions, no thrush, oral mucosa moist   Neck:   No adenopathy, supple, trachea midline, no thyromegaly, no   carotid bruit, no JVD   Lungs:     Clear to auscultation,respirations regular, even and                  unlabored    Heart:  Regular regular   Chest Wall:    No abnormalities observed    Abdomen:     Normal bowel sounds, no masses, no organomegaly, soft        Non-tender non-distended, no guarding,   Extremities:   Moves all extremities well, no edema, no cyanosis, no             Redness   Pulses:   Pulses palpable and equal bilaterally   Skin: Extensive superficial bruising from blood draws   Lymph nodes:   No palpable adenopathy   Neurologic: No localizing neurologic deficits, gait not assessed        Results Review:    Lab Results (last 24 hours)       Procedure Component Value Units Date/Time    Blood Culture - Blood, Arm, Left [[id number]]  (Abnormal)  (Susceptibility) Collected: 06/05/24 1223    Specimen: Blood from Arm, Left Updated: 06/09/24 0729     Blood Culture Escherichia coli ESBL     Comment:   Consider infectious disease consult.  Susceptibility results may not correlate to clinical outcomes.  For ESBL-producing infections in the blood, a carbapenem is recommended as first-line therapy for optimal clinical outcomes.        Isolated from Aerobic Bottle     Gram Stain Aerobic Bottle Gram negative bacilli    Narrative:      Less than seven (7) mL's of blood was collected.  Insufficient quantity may yield false negative results.    Susceptibility        Escherichia coli ESBL      ROBERT      Ertapenem Susceptible      Levofloxacin Resistant      Meropenem Susceptible      Trimethoprim + Sulfamethoxazole Susceptible                       Susceptibility Comments       Escherichia coli ESBL    Cefazolin sensitivity will not be reported for Enterobacteriaceae in non-urine isolates. If cefazolin is preferred, please call the microbiology lab to request an E-test.  With the exception of urinary-sourced infections, aminoglycosides should not be used as monotherapy.               Comprehensive Metabolic Panel [[id number]]  (Abnormal) Collected: 06/09/24 0423    Specimen: Blood Updated: 06/09/24 0449     Glucose 120 mg/dL      BUN 45 mg/dL      Creatinine 1.23 mg/dL      Sodium 130 mmol/L       Potassium 4.4 mmol/L      Chloride 95 mmol/L      CO2 28.3 mmol/L      Calcium 9.5 mg/dL      Total Protein 6.0 g/dL      Albumin 3.2 g/dL      ALT (SGPT) 7 U/L      AST (SGOT) 11 U/L      Alkaline Phosphatase 76 U/L      Total Bilirubin 0.4 mg/dL      Globulin 2.8 gm/dL      A/G Ratio 1.1 g/dL      BUN/Creatinine Ratio 36.6     Anion Gap 6.7 mmol/L      eGFR 45.4 mL/min/1.73     Narrative:      GFR Normal >60  Chronic Kidney Disease <60  Kidney Failure <15    The GFR formula is only valid for adults with stable renal function between ages 18 and 70.    CBC & Differential [[id number]]  (Abnormal) Collected: 06/09/24 0423    Specimen: Blood Updated: 06/09/24 0428    Narrative:      The following orders were created for panel order CBC & Differential.  Procedure                               Abnormality         Status                     ---------                               -----------         ------                     CBC Auto Differential[[id number]]        Abnormal            Final result                 Please view results for these tests on the individual orders.    CBC Auto Differential [[id number]]  (Abnormal) Collected: 06/09/24 0423    Specimen: Blood Updated: 06/09/24 0428     WBC 11.95 10*3/mm3      RBC 3.94 10*6/mm3      Hemoglobin 11.2 g/dL      Hematocrit 37.2 %      MCV 94.4 fL      MCH 28.4 pg      MCHC 30.1 g/dL      RDW 16.2 %      RDW-SD 57.1 fl      MPV 10.5 fL      Platelets 201 10*3/mm3      Neutrophil % 87.0 %      Lymphocyte % 4.7 %      Monocyte % 5.4 %      Eosinophil % 1.9 %      Basophil % 0.3 %      Immature Grans % 0.7 %      Neutrophils, Absolute 10.41 10*3/mm3      Lymphocytes, Absolute 0.56 10*3/mm3      Monocytes, Absolute 0.64 10*3/mm3      Eosinophils, Absolute 0.23 10*3/mm3      Basophils, Absolute 0.03 10*3/mm3      Immature Grans, Absolute 0.08 10*3/mm3      nRBC 0.0 /100 WBC     Tacrolimus Level [[id number]] Collected: 06/09/24 0423    Specimen: Blood Updated: 06/09/24 0426     Potassium [[id number]]  (Normal) Collected: 06/08/24 1354    Specimen: Blood Updated: 06/08/24 1534     Potassium 4.4 mmol/L      Comment: Specimen hemolyzed.  Result may be falsely elevated.  Result checked       Phosphorus [[id number]]  (Normal) Collected: 06/08/24 1354    Specimen: Blood Updated: 06/08/24 1532     Phosphorus 2.5 mg/dL     Blood Culture - Blood, Arm, Left [[id number]]  (Normal) Collected: 06/05/24 1223    Specimen: Blood from Arm, Left Updated: 06/08/24 1245     Blood Culture No growth at 3 days             Imaging Results (Last 24 Hours)       ** No results found for the last 24 hours. **                 I reviewed the patient's new clinical results.    Medication Review:   Scheduled Meds:apixaban, 5 mg, Oral, Q12H  dilTIAZem, 30 mg, Oral, Q8H  DULoxetine, 40 mg, Oral, Daily  famotidine, 20 mg, Oral, Daily  ferrous sulfate, 324 mg, Oral, Daily With Breakfast  levothyroxine, 50 mcg, Oral, Q AM  meropenem, 1,000 mg, Intravenous, Q12H  metoprolol succinate XL, 50 mg, Oral, Daily  predniSONE, 5 mg, Oral, Daily  saccharomyces boulardii, 250 mg, Oral, BID  sodium chloride, 10 mL, Intravenous, Q12H  tacrolimus, 1 mg, Oral, BID  vancomycin, 250 mg, Oral, Q6H      Continuous Infusions:dilTIAZem, 5-15 mg/hr, Last Rate: Stopped (06/07/24 0849)  Pharmacy Consult,       PRN Meds:.  acetaminophen    Calcium Replacement - Follow Nurse / BPA Driven Protocol    carboxymethylcellulose sod    diazePAM    loperamide    Magnesium Standard Dose Replacement - Follow Nurse / BPA Driven Protocol    melatonin    ondansetron    Pharmacy Consult    Phosphorus Replacement - Follow Nurse / BPA Driven Protocol    Potassium Replacement - Follow Nurse / BPA Driven Protocol    [COMPLETED] Insert Peripheral IV **AND** sodium chloride    sodium chloride    sodium chloride     Assessment & Plan       UTI (urinary tract infection)    C. difficile colitis  -Continue oral vancomycin and probiotics; avoid proton pump inhibitors  E. coli  bacteremia-continue meropenem  History of kidney transplant-continue antirejection medications  Immunocompromised secondary to medications  Chronic kidney disease stage III-creatinine stable  Leukocytosis-resolving  Permanent atrial fibs-continue diltiazem, anticoagulation  Chronic anemia-oral iron  Mood disorder-duloxetine  Hypothyroidism-levothyroxine        Plan for disposition: SNF at discharge    Juanis Lowe MD  06/09/24  11:18 EDT

## 2024-06-09 NOTE — PROGRESS NOTES
[facility]     Progress Note    Patient Name: Jaja Carcamo  : 1945  MRN: [id number]  Primary Care Physician:  Jonathan Luna APRN  Date of admission: 2024    Subjective   Subjective     Chief Complaint: DDKT    History of Present Illness  Patient with DDKT and UTI    Review of Systems    Objective   Objective     Vitals:   Temp:  [97.5 °F (36.4 °C)-97.8 °F (36.6 °C)] 97.7 °F (36.5 °C)  Heart Rate:  [83-93] 88  Resp:  [21-28] 22  BP: (114-128)/(53-89) 121/53  Flow (L/min):  [3] 3    Physical Exam   General Appearance:  In no acute distress.    HEENT: Normal HEENT exam.     Extremities: .  There is no deformity, effusion or dependent edema.    Neurological: Patient is alert     Result Review    Result Review:  I have personally reviewed the results from the time of this admission to 2024 07:23 EDT and agree with these findings:  []  Laboratory list / accordion  []  Microbiology  []  Radiology  []  EKG/Telemetry   []  Cardiology/Vascular   []  Pathology  []  Old records  []  Other:  Most notable findings include:       Assessment & Plan   Assessment / Plan     Brief Patient Summary:  Jaja Carcamo is a 79 y.o. female who has DDKT and UTI    Active Hospital Problems:  Active Hospital Problems    Diagnosis     **UTI (urinary tract infection)     C. difficile colitis      Plan:   1) TONYA, improved and near baseline  2) DDKT  3) Chronic ImmunoRx  4) UTI - E. Coli/ESBL lactate level improving  5) ESBL Bacteremia  6) C. Diff  7) Lactic acidosis  8) Tachycardia  9) Hypokalemia, related to GI loss  10) CHF  11) Hyponatremia    Patient seen and examined. Awake, alert. No distress. Labs and chart reviewed. Cr stable at 1.2.  meds reviewed.  Will continue current. With inability to urinate. S/p straight cath. Wll monitor. May need pandey and urology eval    Yvette Gallego MD

## 2024-06-10 LAB
ANION GAP SERPL CALCULATED.3IONS-SCNC: 6.1 MMOL/L (ref 5–15)
BACTERIA SPEC AEROBE CULT: NORMAL
BASOPHILS # BLD AUTO: 0.03 10*3/MM3 (ref 0–0.2)
BASOPHILS NFR BLD AUTO: 0.3 % (ref 0–1.5)
BUN SERPL-MCNC: 37 MG/DL (ref 8–23)
BUN/CREAT SERPL: 38.1 (ref 7–25)
CALCIUM SPEC-SCNC: 9.4 MG/DL (ref 8.6–10.5)
CHLORIDE SERPL-SCNC: 94 MMOL/L (ref 98–107)
CO2 SERPL-SCNC: 28.9 MMOL/L (ref 22–29)
CREAT SERPL-MCNC: 0.97 MG/DL (ref 0.57–1)
DEPRECATED RDW RBC AUTO: 55 FL (ref 37–54)
EGFRCR SERPLBLD CKD-EPI 2021: 60.3 ML/MIN/1.73
EOSINOPHIL # BLD AUTO: 0.35 10*3/MM3 (ref 0–0.4)
EOSINOPHIL NFR BLD AUTO: 2.9 % (ref 0.3–6.2)
ERYTHROCYTE [DISTWIDTH] IN BLOOD BY AUTOMATED COUNT: 15.9 % (ref 12.3–15.4)
GLUCOSE SERPL-MCNC: 106 MG/DL (ref 65–99)
HCT VFR BLD AUTO: 35.3 % (ref 34–46.6)
HGB BLD-MCNC: 10.6 G/DL (ref 12–15.9)
IMM GRANULOCYTES # BLD AUTO: 0.24 10*3/MM3 (ref 0–0.05)
IMM GRANULOCYTES NFR BLD AUTO: 2 % (ref 0–0.5)
LYMPHOCYTES # BLD AUTO: 0.91 10*3/MM3 (ref 0.7–3.1)
LYMPHOCYTES NFR BLD AUTO: 7.6 % (ref 19.6–45.3)
MCH RBC QN AUTO: 28.4 PG (ref 26.6–33)
MCHC RBC AUTO-ENTMCNC: 30 G/DL (ref 31.5–35.7)
MCV RBC AUTO: 94.6 FL (ref 79–97)
MONOCYTES # BLD AUTO: 0.8 10*3/MM3 (ref 0.1–0.9)
MONOCYTES NFR BLD AUTO: 6.7 % (ref 5–12)
NEUTROPHILS NFR BLD AUTO: 80.5 % (ref 42.7–76)
NEUTROPHILS NFR BLD AUTO: 9.64 10*3/MM3 (ref 1.7–7)
NRBC BLD AUTO-RTO: 0 /100 WBC (ref 0–0.2)
PLATELET # BLD AUTO: 219 10*3/MM3 (ref 140–450)
PMV BLD AUTO: 9.6 FL (ref 6–12)
POTASSIUM SERPL-SCNC: 4 MMOL/L (ref 3.5–5.2)
RBC # BLD AUTO: 3.73 10*6/MM3 (ref 3.77–5.28)
SODIUM SERPL-SCNC: 129 MMOL/L (ref 136–145)
WBC NRBC COR # BLD AUTO: 11.97 10*3/MM3 (ref 3.4–10.8)

## 2024-06-10 PROCEDURE — 63710000001 PREDNISONE PER 5 MG

## 2024-06-10 PROCEDURE — 25010000002 MEROPENEM PER 100 MG: Performed by: NURSE PRACTITIONER

## 2024-06-10 PROCEDURE — 85025 COMPLETE CBC W/AUTO DIFF WBC: CPT | Performed by: NURSE PRACTITIONER

## 2024-06-10 PROCEDURE — 80048 BASIC METABOLIC PNL TOTAL CA: CPT | Performed by: NURSE PRACTITIONER

## 2024-06-10 PROCEDURE — 63710000001 TACROLIMUS PER 1 MG

## 2024-06-10 RX ADMIN — Medication 250 MG: at 08:25

## 2024-06-10 RX ADMIN — APIXABAN 5 MG: 5 TABLET, FILM COATED ORAL at 08:25

## 2024-06-10 RX ADMIN — LEVOTHYROXINE SODIUM 50 MCG: 0.05 TABLET ORAL at 05:10

## 2024-06-10 RX ADMIN — VANCOMYCIN HYDROCHLORIDE 250 MG: 250 CAPSULE ORAL at 13:12

## 2024-06-10 RX ADMIN — DULOXETINE HYDROCHLORIDE 40 MG: 20 CAPSULE, DELAYED RELEASE ORAL at 08:26

## 2024-06-10 RX ADMIN — Medication 10 ML: at 08:31

## 2024-06-10 RX ADMIN — APIXABAN 5 MG: 5 TABLET, FILM COATED ORAL at 20:57

## 2024-06-10 RX ADMIN — FAMOTIDINE 20 MG: 20 TABLET, FILM COATED ORAL at 08:26

## 2024-06-10 RX ADMIN — DILTIAZEM HYDROCHLORIDE 30 MG: 30 TABLET, FILM COATED ORAL at 05:10

## 2024-06-10 RX ADMIN — VANCOMYCIN HYDROCHLORIDE 250 MG: 250 CAPSULE ORAL at 05:10

## 2024-06-10 RX ADMIN — VANCOMYCIN HYDROCHLORIDE 250 MG: 250 CAPSULE ORAL at 18:40

## 2024-06-10 RX ADMIN — PREDNISONE 5 MG: 5 TABLET ORAL at 08:26

## 2024-06-10 RX ADMIN — DILTIAZEM HYDROCHLORIDE 30 MG: 30 TABLET, FILM COATED ORAL at 13:12

## 2024-06-10 RX ADMIN — FERROUS SULFATE TAB EC 324 MG (65 MG FE EQUIVALENT) 324 MG: 324 (65 FE) TABLET DELAYED RESPONSE at 08:26

## 2024-06-10 RX ADMIN — Medication 10 ML: at 20:59

## 2024-06-10 RX ADMIN — Medication 250 MG: at 20:57

## 2024-06-10 RX ADMIN — MEROPENEM 1000 MG: 1 INJECTION, POWDER, FOR SOLUTION INTRAVENOUS at 05:13

## 2024-06-10 RX ADMIN — METOPROLOL SUCCINATE 50 MG: 50 TABLET, EXTENDED RELEASE ORAL at 08:25

## 2024-06-10 RX ADMIN — MEROPENEM 1000 MG: 1 INJECTION, POWDER, FOR SOLUTION INTRAVENOUS at 18:31

## 2024-06-10 RX ADMIN — DILTIAZEM HYDROCHLORIDE 30 MG: 30 TABLET, FILM COATED ORAL at 21:00

## 2024-06-10 RX ADMIN — TACROLIMUS 1 MG: 1 CAPSULE ORAL at 20:57

## 2024-06-10 RX ADMIN — TACROLIMUS 1 MG: 1 CAPSULE ORAL at 08:25

## 2024-06-10 RX ADMIN — VANCOMYCIN HYDROCHLORIDE 250 MG: 250 CAPSULE ORAL at 00:19

## 2024-06-10 RX ADMIN — Medication 5 MG: at 20:57

## 2024-06-10 RX ADMIN — DIAZEPAM 5 MG: 5 TABLET ORAL at 20:57

## 2024-06-10 RX ADMIN — VANCOMYCIN HYDROCHLORIDE 250 MG: 250 CAPSULE ORAL at 23:43

## 2024-06-10 RX ADMIN — ACETAMINOPHEN 650 MG: 325 TABLET, FILM COATED ORAL at 03:29

## 2024-06-10 NOTE — PROGRESS NOTES
Infectious Diseases Progress Note      LOS: 4 days   Patient Care Team:  Jonathan Luna APRN as PCP - General (Family Medicine)  Jak Gavin MD as Cardiologist (Cardiology)    Chief Complaint: Diarrhea    Subjective     The had no fever during the last 24 hours.  Remained hemodynamically stable requiring no vasopressors.  Currently on 3 L of oxygen by nasal cannula    Review of Systems:   Review of Systems   Constitutional: Negative.    HENT: Negative.     Eyes: Negative.    Respiratory: Negative.     Cardiovascular: Negative.    Gastrointestinal:  Positive for diarrhea.   Genitourinary: Negative.    Musculoskeletal: Negative.    Skin: Negative.    Neurological: Negative.    Hematological: Negative.    Psychiatric/Behavioral: Negative.          Objective     Vital Signs  Temp:  [97 °F (36.1 °C)-97.8 °F (36.6 °C)] 97.5 °F (36.4 °C)  Heart Rate:  [66-88] 77  Resp:  [15-21] 18  BP: (114-133)/(57-71) 133/67    Physical Exam:  Physical Exam  Vitals and nursing note reviewed.   Constitutional:       Appearance: She is well-developed. She is ill-appearing.   HENT:      Head: Normocephalic and atraumatic.   Eyes:      Pupils: Pupils are equal, round, and reactive to light.   Cardiovascular:      Rate and Rhythm: Normal rate and regular rhythm.      Heart sounds: Normal heart sounds.   Pulmonary:      Effort: Pulmonary effort is normal. No respiratory distress.      Breath sounds: Normal breath sounds. No wheezing or rales.   Abdominal:      General: Bowel sounds are normal. There is no distension.      Palpations: Abdomen is soft. There is no mass.      Tenderness: There is abdominal tenderness. There is no guarding or rebound.   Musculoskeletal:         General: No deformity. Normal range of motion.      Cervical back: Normal range of motion and neck supple.   Skin:     General: Skin is warm.      Findings: No erythema or rash.   Neurological:      Mental Status: She is alert and oriented to person, place, and time.       Cranial Nerves: No cranial nerve deficit.          Results Review:    I have reviewed all clinical data, test, lab, and imaging results.     Radiology  No Radiology Exams Resulted Within Past 24 Hours    Cardiology    Laboratory    Results from last 7 days   Lab Units 06/10/24  0320 06/09/24  0423 06/08/24  0352 06/07/24  0522 06/06/24  0235 06/05/24  1223   WBC 10*3/mm3 11.97* 11.95* 15.73* 14.63* 19.84* 23.12*   HEMOGLOBIN g/dL 10.6* 11.2* 10.8* 10.6* 13.5 13.4   HEMATOCRIT % 35.3 37.2 35.1 34.9 43.6 43.5   PLATELETS 10*3/mm3 219 201 205 199 229 271     Results from last 7 days   Lab Units 06/10/24  0320 06/09/24  0423 06/08/24  1354 06/08/24  0352 06/07/24  1506 06/07/24  0522 06/06/24  0235 06/05/24  1223   SODIUM mmol/L 129* 130*  --  132*  --  131* 139 134*   POTASSIUM mmol/L 4.0 4.4 4.4 3.2* 3.8 2.8* 3.2* 3.1*   CHLORIDE mmol/L 94* 95*  --  94*  --  92* 93* 87*   CO2 mmol/L 28.9 28.3  --  27.3  --  28.8 29.6* 28.9   BUN mg/dL 37* 45*  --  48*  --  42* 47* 50*   CREATININE mg/dL 0.97 1.23*  --  1.18*  --  1.04* 1.16* 1.45*   GLUCOSE mg/dL 106* 120*  --  121*  --  203* 201* 320*   ALBUMIN g/dL  --  3.2*  --  3.2*  --   --   --  4.4   BILIRUBIN mg/dL  --  0.4  --  0.5  --   --   --  1.4*   ALK PHOS U/L  --  76  --  77  --   --   --  94   AST (SGOT) U/L  --  11  --  13  --   --   --  31   ALT (SGPT) U/L  --  7  --  8  --   --   --  17   CALCIUM mg/dL 9.4 9.5  --  9.3  --  9.4 10.2 10.3                 Microbiology   Microbiology Results (last 10 days)       Procedure Component Value - Date/Time    Clostridioides difficile Toxin - Stool, Per Rectum [[id number]]  (Abnormal) Collected: 06/07/24 0438    Lab Status: Final result Specimen: Stool from Per Rectum Updated: 06/07/24 0701    Narrative:      The following orders were created for panel order Clostridioides difficile Toxin - Stool, Per Rectum.  Procedure                               Abnormality         Status                     ---------                                -----------         ------                     Clostridioides difficile...[[id number]]  Abnormal            Final result                 Please view results for these tests on the individual orders.    Clostridioides difficile EIA - Stool, Per Rectum [[id number]]  (Abnormal) Collected: 06/07/24 0438    Lab Status: Final result Specimen: Stool from Per Rectum Updated: 06/07/24 0701     C Diff GDH Ag Positive     C.diff Toxin Ag Positive    Narrative:      DNA from a toxigenic strain of C.difficile was detected, along with the presence of free toxin. These results are suggestive of C.difficile infection, in context of an appropriate clinical scenario.    CANDIDA AURIS PCR - Swab, Axilla Right, Axilla Left and Groin [[id number]]  (Normal) Collected: 06/06/24 0848    Lab Status: Final result Specimen: Swab from Axilla Right, Axilla Left and Groin Updated: 06/07/24 0948     CANDIDA AURIS PCR Not Detected    Gastrointestinal Panel, PCR - Stool, Per Rectum [[id number]]  (Normal) Collected: 06/05/24 1818    Lab Status: Final result Specimen: Stool from Per Rectum Updated: 06/05/24 2034     Campylobacter Not Detected     Plesiomonas shigelloides Not Detected     Salmonella Not Detected     Vibrio Not Detected     Vibrio cholerae Not Detected     Yersinia enterocolitica Not Detected     Enteroaggregative E. coli (EAEC) Not Detected     Enteropathogenic E. coli (EPEC) Not Detected     Enterotoxigenic E. coli (ETEC) lt/st Not Detected     Shiga-like toxin-producing E. coli (STEC) stx1/stx2 Not Detected     Shigella/Enteroinvasive E. coli (EIEC) Not Detected     Cryptosporidium Not Detected     Cyclospora cayetanensis Not Detected     Entamoeba histolytica Not Detected     Giardia lamblia Not Detected     Adenovirus F40/41 Not Detected     Astrovirus Not Detected     Norovirus GI/GII Not Detected     Rotavirus A Not Detected     Sapovirus (I, II, IV or V) Not Detected    Narrative:      If Aeromonas, Staphylococcus aureus  or Bacillus cereus are suspected, please order [id number]: Stool Culture, Aeromonas, S aureus, B Cereus.    Respiratory Panel PCR w/COVID-19(SARS-CoV-2) HODAN/MARIA M/TWILA/PAD/COR/JENISE In-House, NP Swab in UTM/VTM, 2 HR TAT - Swab, Nasopharynx [[id number]]  (Normal) Collected: 06/05/24 1224    Lab Status: Final result Specimen: Swab from Nasopharynx Updated: 06/05/24 1326     ADENOVIRUS, PCR Not Detected     Coronavirus 229E Not Detected     Coronavirus HKU1 Not Detected     Coronavirus NL63 Not Detected     Coronavirus OC43 Not Detected     COVID19 Not Detected     Human Metapneumovirus Not Detected     Human Rhinovirus/Enterovirus Not Detected     Influenza A PCR Not Detected     Influenza B PCR Not Detected     Parainfluenza Virus 1 Not Detected     Parainfluenza Virus 2 Not Detected     Parainfluenza Virus 3 Not Detected     Parainfluenza Virus 4 Not Detected     RSV, PCR Not Detected     Bordetella pertussis pcr Not Detected     Bordetella parapertussis PCR Not Detected     Chlamydophila pneumoniae PCR Not Detected     Mycoplasma pneumo by PCR Not Detected    Narrative:      In the setting of a positive respiratory panel with a viral infection PLUS a negative procalcitonin without other underlying concern for bacterial infection, consider observing off antibiotics or discontinuation of antibiotics and continue supportive care. If the respiratory panel is positive for atypical bacterial infection (Bordetella pertussis, Chlamydophila pneumoniae, or Mycoplasma pneumoniae), consider antibiotic de-escalation to target atypical bacterial infection.    Urine Culture - Urine, Straight Cath [[id number]]  (Abnormal)  (Susceptibility) Collected: 06/05/24 1224    Lab Status: Final result Specimen: Urine from Straight Cath Updated: 06/07/24 1039     Urine Culture >100,000 CFU/mL Escherichia coli ESBL     Comment:   Consider infectious disease consult.  Susceptibility results may not correlate to clinical outcomes.       Narrative:       Colonization of the urinary tract without infection is common. Treatment is discouraged unless the patient is symptomatic, pregnant, or undergoing an invasive urologic procedure.  Recent outcomes data supports the use of pip/tazo in the treatment of susceptible ESBL infections for uncomplicated UTI. Consider use of pip/tazo as a carbapenem-sparing regimen in applicable patients.    Susceptibility        Escherichia coli ESBL      ROBERT      Ertapenem Susceptible      Gentamicin Susceptible      Levofloxacin Resistant      Meropenem Susceptible      Nitrofurantoin Susceptible      Piperacillin + Tazobactam Susceptible      Trimethoprim + Sulfamethoxazole Resistant                           Blood Culture - Blood, Arm, Left [[id number]]  (Normal) Collected: 06/05/24 1223    Lab Status: Preliminary result Specimen: Blood from Arm, Left Updated: 06/09/24 1245     Blood Culture No growth at 4 days    Blood Culture - Blood, Arm, Left [[id number]]  (Abnormal)  (Susceptibility) Collected: 06/05/24 1223    Lab Status: Final result Specimen: Blood from Arm, Left Updated: 06/09/24 0729     Blood Culture Escherichia coli ESBL     Comment:   Consider infectious disease consult.  Susceptibility results may not correlate to clinical outcomes.  For ESBL-producing infections in the blood, a carbapenem is recommended as first-line therapy for optimal clinical outcomes.        Isolated from Aerobic Bottle     Gram Stain Aerobic Bottle Gram negative bacilli    Narrative:      Less than seven (7) mL's of blood was collected.  Insufficient quantity may yield false negative results.    Susceptibility        Escherichia coli ESBL      ROBERT      Ertapenem Susceptible      Levofloxacin Resistant      Meropenem Susceptible      Trimethoprim + Sulfamethoxazole Susceptible                       Susceptibility Comments       Escherichia coli ESBL    Cefazolin sensitivity will not be reported for Enterobacteriaceae in non-urine isolates. If cefazolin  is preferred, please call the microbiology lab to request an E-test.  With the exception of urinary-sourced infections, aminoglycosides should not be used as monotherapy.               Blood Culture ID, PCR - Blood, Arm, Left [[id number]]  (Abnormal) Collected: 06/05/24 1223    Lab Status: Final result Specimen: Blood from Arm, Left Updated: 06/07/24 0608     BCID, PCR Escherichia coli. Identification by BCID2 PCR.     BCID, PCR 2 CTX-M (ESBL) detected. Identification by BCID2 PCR     BOTTLE TYPE Aerobic Bottle            Medication Review:       Schedule Meds  apixaban, 5 mg, Oral, Q12H  dilTIAZem, 30 mg, Oral, Q8H  DULoxetine, 40 mg, Oral, Daily  famotidine, 20 mg, Oral, Daily  ferrous sulfate, 324 mg, Oral, Daily With Breakfast  levothyroxine, 50 mcg, Oral, Q AM  meropenem, 1,000 mg, Intravenous, Q12H  metoprolol succinate XL, 50 mg, Oral, Daily  predniSONE, 5 mg, Oral, Daily  saccharomyces boulardii, 250 mg, Oral, BID  sodium chloride, 10 mL, Intravenous, Q12H  tacrolimus, 1 mg, Oral, BID  vancomycin, 250 mg, Oral, Q6H        Infusion Meds  Pharmacy Consult,         PRN Meds    acetaminophen    Calcium Replacement - Follow Nurse / BPA Driven Protocol    carboxymethylcellulose sod    diazePAM    loperamide    Magnesium Standard Dose Replacement - Follow Nurse / BPA Driven Protocol    melatonin    ondansetron    Pharmacy Consult    Phosphorus Replacement - Follow Nurse / BPA Driven Protocol    Potassium Replacement - Follow Nurse / BPA Driven Protocol    [COMPLETED] Insert Peripheral IV **AND** sodium chloride    sodium chloride    sodium chloride        Assessment & Plan       Antimicrobial Therapy   1.  IV meropenem        2.  P.o. vancomycin        3.        4.        5.          Assessment     ESBL  E. coli bacteremia secondary to complicated UTI.  CT scan of abdomen pelvis showed no acute findings.  T     Complicated UTI with bacteremia.  Urine culture grew ESBL  E. coli.  Patient had recent  episode of E. coli/ESBL  UTI and treated with p.o. fosfomycin     C. difficile colitis.  According to patient this is her second episode and she had 1 episode in 2013.  Currently has a fecal management system diarrhea is slowing down     Reactive leukocytosis secondary to above and steroids.  White count remained stable.  Trending down     History of COPD chronically on 4 L of oxygen via nasal cannula.  Currently on 3 L     History of kidney transplant in 2017 secondary to granulomatosis polyangiitis.  Currently on prednisone and Prograf     History of lung cancer in the past     Plan     Continue IV meropenem 1 g every 12 hours for total of 14 days-last day on June 20, 2024  Continue p.o. vancomycin  250 mg every 6 hours.  We will consider 3 weeks of treatment with p.o. vancomycin.  Continue probiotics  Continue supportive care  A.m. labs  Enteric isolation for C. difficile colitis  Contact isolation for ESBL  E. Coli  Appropriate PPE was placed on before entering the room  The case was discussed with the patient's son at bedside           Dirk Robin MD  06/10/24  12:41 EDT    Note is dictated utilizing voice recognition software/Dragon

## 2024-06-10 NOTE — PROGRESS NOTES
"RENAL/[facility]:     LOS: 4 days    Patient Care Team:  Jonathan Luna APRN as PCP - General (Family Medicine)  Jak Gavin MD as Cardiologist (Cardiology)    Chief Complaint:  Fever    Subjective     Interval History:   Chart reviewed  Denies any CP or SOA  No worsening edema      Objective     Vital Sign Min/Max for last 24 hours  Temp  Min: 97 °F (36.1 °C)  Max: 97.8 °F (36.6 °C)   BP  Min: 114/57  Max: 133/60   Pulse  Min: 67  Max: 88   Resp  Min: 15  Max: 21   SpO2  Min: 97 %  Max: 98 %   Flow (L/min)  Min: 3  Max: 3   No data recorded     Flowsheet Rows      Flowsheet Row First Filed Value   Admission Height 167.6 cm (66\") Documented at 06/05/2024 1152   Admission Weight 71.3 kg (157 lb 3 oz) Documented at 06/05/2024 1152            No intake/output data recorded.  I/O last 3 completed shifts:  In: 1087 [P.O.:600; I.V.:487]  Out: 1075 [Urine:775; Stool:300]    Physical Exam:  GEN: Awake, NAD  ENT: PERRL, EOMI, MMM  NECK: Supple, no JVD  CHEST: CTAB, no W/R/C  CV: RRR, no M/G/R  ABD: Soft, NT, +BS  SKIN: Warm and Dry  NEURO: CN's intact      WBC WBC   Date Value Ref Range Status   06/10/2024 11.97 (H) 3.40 - 10.80 10*3/mm3 Final   06/09/2024 11.95 (H) 3.40 - 10.80 10*3/mm3 Final   06/08/2024 15.73 (H) 3.40 - 10.80 10*3/mm3 Final      HGB Hemoglobin   Date Value Ref Range Status   06/10/2024 10.6 (L) 12.0 - 15.9 g/dL Final   06/09/2024 11.2 (L) 12.0 - 15.9 g/dL Final   06/08/2024 10.8 (L) 12.0 - 15.9 g/dL Final      HCT Hematocrit   Date Value Ref Range Status   06/10/2024 35.3 34.0 - 46.6 % Final   06/09/2024 37.2 34.0 - 46.6 % Final   06/08/2024 35.1 34.0 - 46.6 % Final      Platlets No results found for: \"LABPLAT\"   MCV MCV   Date Value Ref Range Status   06/10/2024 94.6 79.0 - 97.0 fL Final   06/09/2024 94.4 79.0 - 97.0 fL Final   06/08/2024 93.1 79.0 - 97.0 fL Final          Sodium Sodium   Date Value Ref Range Status   06/10/2024 129 (L) 136 - 145 mmol/L Final   06/09/2024 130 (L) 136 - 145 mmol/L Final " "  06/08/2024 132 (L) 136 - 145 mmol/L Final      Potassium Potassium   Date Value Ref Range Status   06/10/2024 4.0 3.5 - 5.2 mmol/L Final   06/09/2024 4.4 3.5 - 5.2 mmol/L Final   06/08/2024 4.4 3.5 - 5.2 mmol/L Final     Comment:     Specimen hemolyzed.  Result may be falsely elevated.  Result checked   06/08/2024 3.2 (L) 3.5 - 5.2 mmol/L Final   06/07/2024 3.8 3.5 - 5.2 mmol/L Final     Comment:     Specimen hemolyzed.  Result may be falsely elevated.      Chloride Chloride   Date Value Ref Range Status   06/10/2024 94 (L) 98 - 107 mmol/L Final   06/09/2024 95 (L) 98 - 107 mmol/L Final   06/08/2024 94 (L) 98 - 107 mmol/L Final      CO2 CO2   Date Value Ref Range Status   06/10/2024 28.9 22.0 - 29.0 mmol/L Final   06/09/2024 28.3 22.0 - 29.0 mmol/L Final   06/08/2024 27.3 22.0 - 29.0 mmol/L Final      BUN BUN   Date Value Ref Range Status   06/10/2024 37 (H) 8 - 23 mg/dL Final   06/09/2024 45 (H) 8 - 23 mg/dL Final   06/08/2024 48 (H) 8 - 23 mg/dL Final      Creatinine Creatinine   Date Value Ref Range Status   06/10/2024 0.97 0.57 - 1.00 mg/dL Final   06/09/2024 1.23 (H) 0.57 - 1.00 mg/dL Final   06/08/2024 1.18 (H) 0.57 - 1.00 mg/dL Final      Calcium Calcium   Date Value Ref Range Status   06/10/2024 9.4 8.6 - 10.5 mg/dL Final   06/09/2024 9.5 8.6 - 10.5 mg/dL Final   06/08/2024 9.3 8.6 - 10.5 mg/dL Final      PO4 No results found for: \"CAPO4\"   Albumin Albumin   Date Value Ref Range Status   06/09/2024 3.2 (L) 3.5 - 5.2 g/dL Final   06/08/2024 3.2 (L) 3.5 - 5.2 g/dL Final      Magnesium Magnesium   Date Value Ref Range Status   06/08/2024 2.0 1.6 - 2.4 mg/dL Final      Uric Acid No results found for: \"URICACID\"        Results Review:     I reviewed the patient's new clinical results.    apixaban, 5 mg, Oral, Q12H  dilTIAZem, 30 mg, Oral, Q8H  DULoxetine, 40 mg, Oral, Daily  famotidine, 20 mg, Oral, Daily  ferrous sulfate, 324 mg, Oral, Daily With Breakfast  levothyroxine, 50 mcg, Oral, Q AM  meropenem, 1,000 " mg, Intravenous, Q12H  metoprolol succinate XL, 50 mg, Oral, Daily  predniSONE, 5 mg, Oral, Daily  saccharomyces boulardii, 250 mg, Oral, BID  sodium chloride, 10 mL, Intravenous, Q12H  tacrolimus, 1 mg, Oral, BID  vancomycin, 250 mg, Oral, Q6H      Pharmacy Consult,         Medication Review: Reviewed    Assessment & Plan     1) TONYA  2) DDKT  3) Chronic ImmunoRx  4) UTI - E. Coli/ESBL  5) ESBL Bacteremia  6) C. Diff  7) Lactic acidosis  8) Tachycardia  9) Hypokalemia  10) CHF  11) Hyponatremia     PLAN: Cr improved to 0.9 = in baseline range.  Holding diuretics for now.  ABX for UTI, bacteremia and C. Diff per primary/ID.  Jardiance D/C'd indefinitely.  Fluid restrict given lower Na.  F/U FK level.  Will follow.      Naun Falcon MD  [facility]  06/10/24  07:58 EDT

## 2024-06-10 NOTE — CASE MANAGEMENT/SOCIAL WORK
Continued Stay Note   [place]     Patient Name: Jaja Carcamo  MRN: [id number]  Today's Date: 6/10/2024    Admit Date: 6/5/2024    Plan: DC Plan: Return to [facility] accepted and following. Precert will be required. No PASRR needed. Current with [place] for Home O2@3-4 liters. Will need transport.   Discharge Plan       Row Name 06/10/24 1530       Plan    Plan DC Plan: Return to [facility] accepted and following. Precert will be required. No PASRR needed. Current with [place] for Home O2@3-4 liters. Will need transport.    Patient/Family in Agreement with Plan yes    Provided Post Acute Provider List? N/A    Provided Post Acute Provider Quality & Resource List? N/A    Plan Comments CM reviewed chart documentation for clinical updates. [facility] confirms acceptance when patient ready for DC. ID states patient will continue to need IV abx until 6/20/2024. Facility liaison notified. Patient continiues to have diarrhea with FMS but is slowing.CM will continue to follow for any further needs and adjust discharge plan accordingly. DC Barriers: Cardiac monitoring, O2@3L nc, FMS, Hyponatremia, and monitor labs.                 Expected Discharge Date and Time       Expected Discharge Date Expected Discharge Time    Jun 11, 2024           Phone communication or documentation only- no physical contact with patient or family.      Karina Teresa RN    Office Phone: [phone]  Office Cell:     [phone]

## 2024-06-10 NOTE — DISCHARGE PLACEMENT REQUEST
"Gene Carcamo (79 y.o. Female)       Date of Birth   1945    Social Security Number       [address]   [address] [place] [address]    Home Phone   [phone]    MRN   [id number]       Sikhism   None    Marital Status                               Admission Date   6/5/24    Admission Type   Emergency    Admitting Provider   Juanis Lowe MD    Attending Provider   Juanis Lowe MD    Department, Room/Bed   [facility] CARDIOVASCULAR CARE UNIT, 2206/1       Discharge Date       Discharge Disposition       Discharge Destination                                 Attending Provider: Juanis Lowe MD    Allergies: Zolpidem    Isolation: Contact, Spore   Infection: ESBL (06/07/24), C.difficile (06/07/24), ESBL E coli (06/07/24)   Code Status: CPR    Ht: 167.6 cm (66\")   Wt: 62.5 kg (137 lb 12.6 oz)    Admission Cmt: None   Principal Problem: UTI (urinary tract infection) [N39.0]                   Active Insurance as of 6/5/2024       Primary Coverage       Payor Plan Insurance Group Employer/Plan Group    [facility] MEDICARE REPLACEMENT [facility] MED ADV GROUP [id number]       Payor Plan Address Payor Plan Phone Number Payor Plan Fax Number Effective Dates    [address] [address]   1/1/2024 - None Entered    [facility] [id number]         Subscriber Name Subscriber Birth Date Member ID       GENE CARCAMO 1945 [id number]                     Emergency Contacts        (Rel.) Home Phone Work Phone Mobile Phone    RENNY CARCAMO (Son) [phone] -- [phone]    abbeygoldy pickett (Grandchild) [phone] -- [phone]    Damaris No (Sister) -- -- [phone]                 History & Physical        DarynMariangel APRN at 06/05/24 1611       Attestation signed by Juanis Lowe MD at 06/05/24 1800    I have reviewed this documentation and agree.                      Patient Care Team:  Jonathan Luna APRN as PCP - General (Family Medicine)  Jak Gavin, " MD as Cardiologist (Cardiology)    Chief complaint fever    Subjective    Jaja Carcamo is a 79-year-old female who presents today from [facility]care facility with reports of fever.  Patient has recently been discharged from hospital on 6/3/2024 to [facility].  Patient was hemodynamically stable at time of discharge.  Patient reports she has had a decline in oral intake, and not felt well she has a significant past medical history of kidney transplant on antirejection medications.  She was recently hospitalized and treated for ESBL E. coli.  While in ED patient had a temperature of 103.7 with tachycardia, triggering sepsis.  Urinalysis consistent with UTI, culture pending.  Lactic 3.2 with Pro-Prabhjot 2.39.  Leukocytosis with WBC 23.12    Review of Systems   Constitutional:  Positive for activity change, fatigue and fever.   HENT: Negative.     Respiratory:  Positive for shortness of breath. Negative for chest tightness, wheezing and stridor.    Cardiovascular:  Negative for chest pain and leg swelling.   Genitourinary: Negative.    Musculoskeletal:  Positive for arthralgias and back pain.   Skin: Negative.    Neurological:  Positive for weakness.          History  Past Medical History:   Diagnosis Date    Allergic rhinitis 04/14/2015    Asthma 08/10/2017    Atrial flutter 05/11/2017    Chronic diarrhea 04/15/2019    Chronic hypoxemic respiratory failure 01/18/2024    Chronic pain 02/03/2015    COPD (chronic obstructive pulmonary disease)     COVID-19 virus detected 08/11/2022    Cytokine release syndrome, grade 1 08/16/2022    Edema of both lower extremities due to peripheral venous insufficiency 03/12/2021    ESRD on hemodialysis 05/22/2013    Essential (primary) hypertension 03/03/2022    Fracture of ulnar styloid 05/19/2022    Fracture of unspecified carpal bone, right wrist, subsequent encounter for fracture with routine healing 03/03/2022    Gastroesophageal reflux disease 11/12/2020    Gout 08/10/2017    Hearing  loss 08/10/2017    History of appendectomy     History of DVT (deep vein thrombosis) 11/12/2020    History of kidney transplant 06/30/2017    History of lobectomy of lung 01/18/2024 03/08/2016:  RIGHT Lower Lobe Mass--> Right Video-assisted thoracoscopy with a moderate-to-large wedge resection of the RLL (by Dr. Haris Mcconnell @ [facility])--> Poorly differentiated carcinoma of the RLL.      History of repair of hip joint 05/21/2013    History of suicide attempt 05/20/2024    Hyperlipidemia 08/11/2014    Hypothyroidism, unspecified 03/03/2022    Infection due to extended spectrum beta lactamase (ESBL) producing bacteria 03/11/2016    No A2K system hx. +ESBL E coli urine on 3/11/16.      Irritable bowel syndrome 04/15/2019    Long term (current) use of immunosuppressive biologic 04/28/2021    Long term current use of anticoagulant therapy 01/18/2024    Malignant neoplasm of lung 08/10/2017    Menopausal flushing 08/30/2021    Mitral valve regurgitation 07/06/2015    Mixed anxiety and depressive disorder 04/30/2015    Non-small cell lung cancer 08/10/2017    Nonobstructive atherosclerosis of coronary artery 06/02/2019 08/12/2022: CATH: Prashant: NSTEMI assoc with Covid-19: LM:-nl;  LAD: diffuse, Ca++; 30%; CIRC: Dominant. Normal; RCA: small; 50% diffuse, proximal and mid-segment.      NSTEMI (non-ST elevated myocardial infarction) 08/11/2022    Obsessive-compulsive disorder 04/15/2019    Osteoarthritis 05/20/2024    Paroxysmal atrial fibrillation 05/11/2017    Paroxysmal supraventricular tachycardia 05/11/2017    Peripheral neuropathy 08/11/2014    Personal history of peptic ulcer disease 11/12/2020    Postoperative anemia due to acute blood loss 05/22/2013    Right wrist fracture 03/01/2022    Seasonal allergies 08/10/2017    Secondary hyperparathyroidism of renal origin 09/14/2015    Tricuspid valve regurgitation 03/15/2017    Ulcer of lower extremity 08/30/2021    Unspecified acute appendicitis  03/03/2022    Valvular heart disease 05/20/2024    Wegener's granulomatosis with renal involvement 03/03/2022     Past Surgical History:   Procedure Laterality Date    APPENDECTOMY      BREAST SURGERY Left     cysts rmeoved    CARDIAC CATHETERIZATION Right 08/12/2022    Procedure: Left Heart Cath and coronary angiogram;  Surgeon: Jak Gavin MD;  Location: [facility] CATH INVASIVE LOCATION;  Service: Cardiology;  Laterality: Right;    CLOSED REDUCTION WRIST FRACTURE Right 03/01/2022    Procedure: WRIST CLOSED REDUCTION;  Surgeon: Gaudencio Townsend MD;  Location: [facility] MAIN OR;  Service: Orthopedics;  Laterality: Right;    CYSTOSCOPY      HIP ARTHROPLASTY      HYSTERECTOMY      LUNG LOBECTOMY Right     TRANSPLANTATION RENAL       Family History   Problem Relation Age of Onset    No Known Problems Mother     No Known Problems Father      Social History     Tobacco Use    Smoking status: Former    Smokeless tobacco: Never   Vaping Use    Vaping status: Former   Substance Use Topics    Alcohol use: Never    Drug use: Never     (Not in a hospital admission)    Allergies:  Zolpidem    Objective    Vital Signs  Temp:  [99.8 °F (37.7 °C)-103.7 °F (39.8 °C)] 100.9 °F (38.3 °C)  Heart Rate:  [] 125  Resp:  [20-44] 44  BP: (128-166)/(61-90) 137/72       Physical Exam  Vitals reviewed.   HENT:      Head: Normocephalic.      Right Ear: External ear normal.      Nose: Nose normal.      Mouth/Throat:      Mouth: Mucous membranes are dry.   Eyes:      General:         Right eye: No discharge.         Left eye: No discharge.   Cardiovascular:      Rate and Rhythm: Tachycardia present.      Pulses: Normal pulses.      Heart sounds: Murmur heard.   Pulmonary:      Effort: Pulmonary effort is normal.      Breath sounds: Rhonchi present.   Abdominal:      General: Bowel sounds are normal.      Palpations: Abdomen is soft.   Musculoskeletal:         General: No swelling. Normal range of motion.      Right lower leg: No edema.      Left  lower leg: No edema.   Skin:     General: Skin is warm and dry.   Neurological:      Mental Status: She is alert and oriented to person, place, and time.   Psychiatric:         Mood and Affect: Mood normal.         Behavior: Behavior normal.          Results Review:     Imaging Results (Last 24 Hours)       Procedure Component Value Units Date/Time    XR Chest 1 View [[id number]] Collected: 06/05/24 1244     Updated: 06/05/24 1248    Narrative:      XR CHEST 1 VW    Date of Exam: 6/5/2024 12:43 PM EDT    Indication: cough, soa    Comparison: AP portable chest 5/31/2024. CT chest 5/19/2024    Findings:  Advanced emphysematous changes are seen to better advantage on prior CT chest imaging. There is mild posterior medial left basilar airspace disease favored to represent atelectasis. Left lower lobe aeration appears improved since 5/31/2024. Trace right   basilar pleural effusion is unchanged. No significant left pleural effusion is identified. Benign calcified nodule in the left upper lobe. Heart size is enlarged but stable. No acute osseous abnormality      Impression:      Impression:    1. Improved aeration in the left lower lobe since 5/31/2024. Mild medial left basilar airspace disease remains, favored to represent atelectasis. Mild residual left basilar pneumonia not excluded  2. Stable small right pleural effusion. Left pleural effusion appears to have resolved.  3. Stable cardiomegaly. No overt features of edema.      Electronically Signed: Karie Bustos MD    6/5/2024 12:46 PM EDT    Workstation ID: [id number]    CT Abdomen Pelvis Without Contrast [[id number]] Collected: 06/05/24 1238     Updated: 06/05/24 1245    Narrative:      CT ABDOMEN PELVIS WO CONTRAST    Date of Exam: 6/5/2024 12:31 PM EDT    Indication: vomiting, fever.    Comparison: 5/21/2024    Technique: Axial CT images were obtained of the abdomen and pelvis without the administration of contrast. Sagittal and coronal reconstructions were  performed.  Automated exposure control and iterative reconstruction methods were used.      Findings:  LUNG BASES: Small left pleural effusion with left basal atelectasis, unchanged. Lower lung interstitial coarsening is similar.    LIVER:  Unremarkable parenchyma without focal lesion.  BILIARY/GALLBLADDER: There are calcified gallstones. There are no gallbladder inflammatory changes.  SPLEEN:  Unremarkable  PANCREAS:  Unremarkable  ADRENAL: Stable left adrenal nodule dating back to 2011.  KIDNEYS: Advanced bilateral native renal atrophy with no solid mass identified. There is a right lower quadrant transplant kidney. No obstruction.  There are bilateral native renal calcifications likely combination of vascular and collecting system   calculi.  GASTROINTESTINAL/MESENTERY:  No evidence of obstruction nor inflammation.    AORTA/IVC:  Normal caliber.    RETROPERITONEUM/LYMPH NODES:  Unremarkable    REPRODUCTIVE:  Unremarkable  BLADDER:  Unremarkable    OSSEUS STRUCTURES: There are bilateral total hip arthroplasties with associated streak and beam hardening artifact.      Impression:      Impression:  1.No acute process nor significant change identified.            Electronically Signed: Mark Kiran MD    6/5/2024 12:43 PM EDT    Workstation ID: [id number]             Lab Results (last 24 hours)       Procedure Component Value Units Date/Time    STAT Lactic Acid, Reflex [[id number]] Collected: 06/05/24 1603    Specimen: Blood Updated: 06/05/24 1605    Respiratory Panel PCR w/COVID-19(SARS-CoV-2) HODAN/MARIA M/TWILA/PAD/COR/JENISE In-House, NP Swab in UTM/VTM, 2 HR TAT - Swab, Nasopharynx [[id number]]  (Normal) Collected: 06/05/24 1224    Specimen: Swab from Nasopharynx Updated: 06/05/24 1326     ADENOVIRUS, PCR Not Detected     Coronavirus 229E Not Detected     Coronavirus HKU1 Not Detected     Coronavirus NL63 Not Detected     Coronavirus OC43 Not Detected     COVID19 Not Detected     Human Metapneumovirus Not Detected     Human  Rhinovirus/Enterovirus Not Detected     Influenza A PCR Not Detected     Influenza B PCR Not Detected     Parainfluenza Virus 1 Not Detected     Parainfluenza Virus 2 Not Detected     Parainfluenza Virus 3 Not Detected     Parainfluenza Virus 4 Not Detected     RSV, PCR Not Detected     Bordetella pertussis pcr Not Detected     Bordetella parapertussis PCR Not Detected     Chlamydophila pneumoniae PCR Not Detected     Mycoplasma pneumo by PCR Not Detected    Narrative:      In the setting of a positive respiratory panel with a viral infection PLUS a negative procalcitonin without other underlying concern for bacterial infection, consider observing off antibiotics or discontinuation of antibiotics and continue supportive care. If the respiratory panel is positive for atypical bacterial infection (Bordetella pertussis, Chlamydophila pneumoniae, or Mycoplasma pneumoniae), consider antibiotic de-escalation to target atypical bacterial infection.    Comprehensive Metabolic Panel [[id number]]  (Abnormal) Collected: 06/05/24 1223    Specimen: Blood Updated: 06/05/24 1308     Glucose 320 mg/dL      BUN 50 mg/dL      Comment: Result checked          Creatinine 1.45 mg/dL      Comment: Result checked          Sodium 134 mmol/L      Potassium 3.1 mmol/L      Chloride 87 mmol/L      CO2 28.9 mmol/L      Calcium 10.3 mg/dL      Total Protein 8.2 g/dL      Albumin 4.4 g/dL      ALT (SGPT) 17 U/L      AST (SGOT) 31 U/L      Alkaline Phosphatase 94 U/L      Total Bilirubin 1.4 mg/dL      Globulin 3.8 gm/dL      A/G Ratio 1.2 g/dL      BUN/Creatinine Ratio 34.5     Anion Gap 18.1 mmol/L      eGFR 37.2 mL/min/1.73     Narrative:      GFR Normal >60  Chronic Kidney Disease <60  Kidney Failure <15    The GFR formula is only valid for adults with stable renal function between ages 18 and 70.    Procalcitonin [[id number]]  (Abnormal) Collected: 06/05/24 1223    Specimen: Blood Updated: 06/05/24 1305     Procalcitonin 2.39 ng/mL      "Narrative:      As a Marker for Sepsis (Non-Neonates):    1. <0.5 ng/mL represents a low risk of severe sepsis and/or septic shock.  2. >2 ng/mL represents a high risk of severe sepsis and/or septic shock.    As a Marker for Lower Respiratory Tract Infections that require antibiotic therapy:    PCT on Admission    Antibiotic Therapy       6-12 Hrs later    >0.5                Strongly Recommended  >0.25 - <0.5        Recommended   0.1 - 0.25          Discouraged              Remeasure/reassess PCT  <0.1                Strongly Discouraged     Remeasure/reassess PCT    As 28 day mortality risk marker: \"Change in Procalcitonin Result\" (>80% or <=80%) if Day 0 (or Day 1) and Day 4 values are available. Refer to [email or web address]-[email or web address]    Change in PCT <=80%  A decrease of PCT levels below or equal to 80% defines a positive change in PCT test result representing a higher risk for 28-day all-cause mortality of patients diagnosed with severe sepsis for septic shock.    Change in PCT >80%  A decrease of PCT levels of more than 80% defines a negative change in PCT result representing a lower risk for 28-day all-cause mortality of patients diagnosed with severe sepsis or septic shock.       Urinalysis, Microscopic Only - Straight Cath [[id number]]  (Abnormal) Collected: 06/05/24 1224    Specimen: Urine from Straight Cath Updated: 06/05/24 1302     RBC, UA None Seen /HPF      WBC, UA Too Numerous to Count /HPF      Bacteria, UA 3+ /HPF      Squamous Epithelial Cells, UA None Seen /HPF      Hyaline Casts, UA None Seen /LPF      Methodology Manual Light Microscopy    Urine Culture - Urine, Straight Cath [[id number]] Collected: 06/05/24 1224    Specimen: Urine from Straight Cath Updated: 06/05/24 1302    Urinalysis With Culture If Indicated - Straight Cath [[id number]]  (Abnormal) Collected: 06/05/24 1224    Specimen: Urine from Straight Cath Updated: 06/05/24 1247     Color, UA Yellow     Appearance, UA Slightly " Cloudy     pH, UA 5.5     Specific Gravity, UA 1.022     Glucose, UA >=1000 mg/dL (3+)     Ketones, UA Trace     Bilirubin, UA Negative     Blood, UA Trace     Protein, UA 30 mg/dL (1+)     Leuk Esterase, UA Moderate (2+)     Nitrite, UA Negative     Urobilinogen, UA 1.0 E.U./dL    Narrative:      In absence of clinical symptoms, the presence of pyuria, bacteria, and/or nitrites on the urinalysis result does not correlate with infection.    POC Lactate [[id number]]  (Abnormal) Collected: 06/05/24 1244    Specimen: Blood Updated: 06/05/24 1247     Lactate 3.2 mmol/L      Comment: Serial Number: [id number]:  [id number]       Extra Tubes [[id number]] Collected: 06/05/24 1223    Specimen: Blood, Venous Line Updated: 06/05/24 1245    Narrative:      The following orders were created for panel order Extra Tubes.  Procedure                               Abnormality         Status                     ---------                               -----------         ------                     Gold Top - SST[[id number]]                                   Final result               Light Blue Top[[id number]]                                   Final result                 Please view results for these tests on the individual orders.    Gold Top - SST [[id number]] Collected: 06/05/24 1223    Specimen: Blood Updated: 06/05/24 1245     Extra Tube Hold for add-ons.     Comment: Auto resulted.       Light Blue Top [[id number]] Collected: 06/05/24 1223    Specimen: Blood Updated: 06/05/24 1245     Extra Tube Hold for add-ons.     Comment: Auto resulted       CBC & Differential [[id number]]  (Abnormal) Collected: 06/05/24 1223    Specimen: Blood Updated: 06/05/24 1240    Narrative:      The following orders were created for panel order CBC & Differential.  Procedure                               Abnormality         Status                     ---------                               -----------         ------                     CBC Auto  Differential[[id number]]        Abnormal            Final result                 Please view results for these tests on the individual orders.    CBC Auto Differential [[id number]]  (Abnormal) Collected: 06/05/24 1223    Specimen: Blood Updated: 06/05/24 1240     WBC 23.12 10*3/mm3      RBC 4.66 10*6/mm3      Hemoglobin 13.4 g/dL      Hematocrit 43.5 %      MCV 93.3 fL      MCH 28.8 pg      MCHC 30.8 g/dL      RDW 16.9 %      RDW-SD 58.2 fl      MPV 10.3 fL      Platelets 271 10*3/mm3      Neutrophil % 87.5 %      Lymphocyte % 2.7 %      Monocyte % 8.3 %      Eosinophil % 0.2 %      Basophil % 0.2 %      Immature Grans % 1.1 %      Neutrophils, Absolute 20.25 10*3/mm3      Lymphocytes, Absolute 0.62 10*3/mm3      Monocytes, Absolute 1.92 10*3/mm3      Eosinophils, Absolute 0.04 10*3/mm3      Basophils, Absolute 0.04 10*3/mm3      Immature Grans, Absolute 0.25 10*3/mm3      nRBC 0.1 /100 WBC     Blood Culture - Blood, Arm, Left [[id number]] Collected: 06/05/24 1223    Specimen: Blood from Arm, Left Updated: 06/05/24 1233    Blood Culture - Blood, Arm, Left [[id number]] Collected: 06/05/24 1223    Specimen: Blood from Arm, Left Updated: 06/05/24 1233             I reviewed the patient's new clinical results.    Assessment & Plan    Urinary tract infection  Fever, 103.7  Sepsis  Leukocytosis  -Lactic acid 3.2 with Pro-Prabhjot 2.39  -Will start Rocephin and follow culture  -Gentle hydration    Acute on chronic kidney disease  Elevated creatinine  History of renal transplant on antirejection medication prednisone and Prograf  -Gentle hydration  -Appreciate nephrology's input    Hypokalemia, replace    Congestive heart failure  -Gentle hydration  -Diuretics on hold  -Continue Jardiance    Atrial fibrillation  Macrocytic anemia  Chronic coronary artery disease  Pulmonary hypertension    Diet: Healthy heart, renal  DVT prophylaxis: SCDs  GI prophylaxis: Protonix  Code status: Full code      I discussed the patient's findings and my  recommendations with patient.     TONYA Mathis  06/05/24  16:11 EDT        Electronically signed by Juanis Lowe MD at 06/05/24 1800       Operative/Procedure Notes (all)    No notes of this type exist for this encounter.          Physician Progress Notes (last 48 hours)        Dirk Robin MD at 06/10/24 1241          Infectious Diseases Progress Note      LOS: 4 days   Patient Care Team:  Jonathan Luna APRN as PCP - General (Family Medicine)  Jak Gavin MD as Cardiologist (Cardiology)    Chief Complaint: Diarrhea    Subjective     The had no fever during the last 24 hours.  Remained hemodynamically stable requiring no vasopressors.  Currently on 3 L of oxygen by nasal cannula    Review of Systems:   Review of Systems   Constitutional: Negative.    HENT: Negative.     Eyes: Negative.    Respiratory: Negative.     Cardiovascular: Negative.    Gastrointestinal:  Positive for diarrhea.   Genitourinary: Negative.    Musculoskeletal: Negative.    Skin: Negative.    Neurological: Negative.    Hematological: Negative.    Psychiatric/Behavioral: Negative.          Objective     Vital Signs  Temp:  [97 °F (36.1 °C)-97.8 °F (36.6 °C)] 97.5 °F (36.4 °C)  Heart Rate:  [66-88] 77  Resp:  [15-21] 18  BP: (114-133)/(57-71) 133/67    Physical Exam:  Physical Exam  Vitals and nursing note reviewed.   Constitutional:       Appearance: She is well-developed. She is ill-appearing.   HENT:      Head: Normocephalic and atraumatic.   Eyes:      Pupils: Pupils are equal, round, and reactive to light.   Cardiovascular:      Rate and Rhythm: Normal rate and regular rhythm.      Heart sounds: Normal heart sounds.   Pulmonary:      Effort: Pulmonary effort is normal. No respiratory distress.      Breath sounds: Normal breath sounds. No wheezing or rales.   Abdominal:      General: Bowel sounds are normal. There is no distension.      Palpations: Abdomen is soft. There is no mass.      Tenderness: There is abdominal tenderness.  There is no guarding or rebound.   Musculoskeletal:         General: No deformity. Normal range of motion.      Cervical back: Normal range of motion and neck supple.   Skin:     General: Skin is warm.      Findings: No erythema or rash.   Neurological:      Mental Status: She is alert and oriented to person, place, and time.      Cranial Nerves: No cranial nerve deficit.          Results Review:    I have reviewed all clinical data, test, lab, and imaging results.     Radiology  No Radiology Exams Resulted Within Past 24 Hours    Cardiology    Laboratory    Results from last 7 days   Lab Units 06/10/24  0320 06/09/24  0423 06/08/24  0352 06/07/24  0522 06/06/24  0235 06/05/24  1223   WBC 10*3/mm3 11.97* 11.95* 15.73* 14.63* 19.84* 23.12*   HEMOGLOBIN g/dL 10.6* 11.2* 10.8* 10.6* 13.5 13.4   HEMATOCRIT % 35.3 37.2 35.1 34.9 43.6 43.5   PLATELETS 10*3/mm3 219 201 205 199 229 271     Results from last 7 days   Lab Units 06/10/24  0320 06/09/24  0423 06/08/24  1354 06/08/24  0352 06/07/24  1506 06/07/24  0522 06/06/24  0235 06/05/24  1223   SODIUM mmol/L 129* 130*  --  132*  --  131* 139 134*   POTASSIUM mmol/L 4.0 4.4 4.4 3.2* 3.8 2.8* 3.2* 3.1*   CHLORIDE mmol/L 94* 95*  --  94*  --  92* 93* 87*   CO2 mmol/L 28.9 28.3  --  27.3  --  28.8 29.6* 28.9   BUN mg/dL 37* 45*  --  48*  --  42* 47* 50*   CREATININE mg/dL 0.97 1.23*  --  1.18*  --  1.04* 1.16* 1.45*   GLUCOSE mg/dL 106* 120*  --  121*  --  203* 201* 320*   ALBUMIN g/dL  --  3.2*  --  3.2*  --   --   --  4.4   BILIRUBIN mg/dL  --  0.4  --  0.5  --   --   --  1.4*   ALK PHOS U/L  --  76  --  77  --   --   --  94   AST (SGOT) U/L  --  11  --  13  --   --   --  31   ALT (SGPT) U/L  --  7  --  8  --   --   --  17   CALCIUM mg/dL 9.4 9.5  --  9.3  --  9.4 10.2 10.3                 Microbiology   Microbiology Results (last 10 days)       Procedure Component Value - Date/Time    Clostridioides difficile Toxin - Stool, Per Rectum [[id number]]  (Abnormal) Collected:  06/07/24 0438    Lab Status: Final result Specimen: Stool from Per Rectum Updated: 06/07/24 0701    Narrative:      The following orders were created for panel order Clostridioides difficile Toxin - Stool, Per Rectum.  Procedure                               Abnormality         Status                     ---------                               -----------         ------                     Clostridioides difficile...[[id number]]  Abnormal            Final result                 Please view results for these tests on the individual orders.    Clostridioides difficile EIA - Stool, Per Rectum [[id number]]  (Abnormal) Collected: 06/07/24 0438    Lab Status: Final result Specimen: Stool from Per Rectum Updated: 06/07/24 0701     C Diff GDH Ag Positive     C.diff Toxin Ag Positive    Narrative:      DNA from a toxigenic strain of C.difficile was detected, along with the presence of free toxin. These results are suggestive of C.difficile infection, in context of an appropriate clinical scenario.    CANDIDA AURIS PCR - Swab, Axilla Right, Axilla Left and Groin [[id number]]  (Normal) Collected: 06/06/24 0848    Lab Status: Final result Specimen: Swab from Axilla Right, Axilla Left and Groin Updated: 06/07/24 0948     CANDIDA AURIS PCR Not Detected    Gastrointestinal Panel, PCR - Stool, Per Rectum [[id number]]  (Normal) Collected: 06/05/24 1818    Lab Status: Final result Specimen: Stool from Per Rectum Updated: 06/05/24 2034     Campylobacter Not Detected     Plesiomonas shigelloides Not Detected     Salmonella Not Detected     Vibrio Not Detected     Vibrio cholerae Not Detected     Yersinia enterocolitica Not Detected     Enteroaggregative E. coli (EAEC) Not Detected     Enteropathogenic E. coli (EPEC) Not Detected     Enterotoxigenic E. coli (ETEC) lt/st Not Detected     Shiga-like toxin-producing E. coli (STEC) stx1/stx2 Not Detected     Shigella/Enteroinvasive E. coli (EIEC) Not Detected     Cryptosporidium Not Detected      Cyclospora cayetanensis Not Detected     Entamoeba histolytica Not Detected     Giardia lamblia Not Detected     Adenovirus F40/41 Not Detected     Astrovirus Not Detected     Norovirus GI/GII Not Detected     Rotavirus A Not Detected     Sapovirus (I, II, IV or V) Not Detected    Narrative:      If Aeromonas, Staphylococcus aureus or Bacillus cereus are suspected, please order [id number]: Stool Culture, Aeromonas, S aureus, B Cereus.    Respiratory Panel PCR w/COVID-19(SARS-CoV-2) HODAN/MARIA M/TWILA/PAD/COR/JENISE In-House, NP Swab in UTM/VTM, 2 HR TAT - Swab, Nasopharynx [[id number]]  (Normal) Collected: 06/05/24 1224    Lab Status: Final result Specimen: Swab from Nasopharynx Updated: 06/05/24 1326     ADENOVIRUS, PCR Not Detected     Coronavirus 229E Not Detected     Coronavirus HKU1 Not Detected     Coronavirus NL63 Not Detected     Coronavirus OC43 Not Detected     COVID19 Not Detected     Human Metapneumovirus Not Detected     Human Rhinovirus/Enterovirus Not Detected     Influenza A PCR Not Detected     Influenza B PCR Not Detected     Parainfluenza Virus 1 Not Detected     Parainfluenza Virus 2 Not Detected     Parainfluenza Virus 3 Not Detected     Parainfluenza Virus 4 Not Detected     RSV, PCR Not Detected     Bordetella pertussis pcr Not Detected     Bordetella parapertussis PCR Not Detected     Chlamydophila pneumoniae PCR Not Detected     Mycoplasma pneumo by PCR Not Detected    Narrative:      In the setting of a positive respiratory panel with a viral infection PLUS a negative procalcitonin without other underlying concern for bacterial infection, consider observing off antibiotics or discontinuation of antibiotics and continue supportive care. If the respiratory panel is positive for atypical bacterial infection (Bordetella pertussis, Chlamydophila pneumoniae, or Mycoplasma pneumoniae), consider antibiotic de-escalation to target atypical bacterial infection.    Urine Culture - Urine, Straight Cath [[id number]]   (Abnormal)  (Susceptibility) Collected: 06/05/24 1224    Lab Status: Final result Specimen: Urine from Straight Cath Updated: 06/07/24 1039     Urine Culture >100,000 CFU/mL Escherichia coli ESBL     Comment:   Consider infectious disease consult.  Susceptibility results may not correlate to clinical outcomes.       Narrative:      Colonization of the urinary tract without infection is common. Treatment is discouraged unless the patient is symptomatic, pregnant, or undergoing an invasive urologic procedure.  Recent outcomes data supports the use of pip/tazo in the treatment of susceptible ESBL infections for uncomplicated UTI. Consider use of pip/tazo as a carbapenem-sparing regimen in applicable patients.    Susceptibility        Escherichia coli ESBL      ROBERT      Ertapenem Susceptible      Gentamicin Susceptible      Levofloxacin Resistant      Meropenem Susceptible      Nitrofurantoin Susceptible      Piperacillin + Tazobactam Susceptible      Trimethoprim + Sulfamethoxazole Resistant                           Blood Culture - Blood, Arm, Left [[id number]]  (Normal) Collected: 06/05/24 1223    Lab Status: Preliminary result Specimen: Blood from Arm, Left Updated: 06/09/24 1245     Blood Culture No growth at 4 days    Blood Culture - Blood, Arm, Left [[id number]]  (Abnormal)  (Susceptibility) Collected: 06/05/24 1223    Lab Status: Final result Specimen: Blood from Arm, Left Updated: 06/09/24 0729     Blood Culture Escherichia coli ESBL     Comment:   Consider infectious disease consult.  Susceptibility results may not correlate to clinical outcomes.  For ESBL-producing infections in the blood, a carbapenem is recommended as first-line therapy for optimal clinical outcomes.        Isolated from Aerobic Bottle     Gram Stain Aerobic Bottle Gram negative bacilli    Narrative:      Less than seven (7) mL's of blood was collected.  Insufficient quantity may yield false negative results.    Susceptibility         Escherichia coli ESBL      ROBERT      Ertapenem Susceptible      Levofloxacin Resistant      Meropenem Susceptible      Trimethoprim + Sulfamethoxazole Susceptible                       Susceptibility Comments       Escherichia coli ESBL    Cefazolin sensitivity will not be reported for Enterobacteriaceae in non-urine isolates. If cefazolin is preferred, please call the microbiology lab to request an E-test.  With the exception of urinary-sourced infections, aminoglycosides should not be used as monotherapy.               Blood Culture ID, PCR - Blood, Arm, Left [[id number]]  (Abnormal) Collected: 06/05/24 1223    Lab Status: Final result Specimen: Blood from Arm, Left Updated: 06/07/24 0608     BCID, PCR Escherichia coli. Identification by BCID2 PCR.     BCID, PCR 2 CTX-M (ESBL) detected. Identification by BCID2 PCR     BOTTLE TYPE Aerobic Bottle            Medication Review:       Schedule Meds  apixaban, 5 mg, Oral, Q12H  dilTIAZem, 30 mg, Oral, Q8H  DULoxetine, 40 mg, Oral, Daily  famotidine, 20 mg, Oral, Daily  ferrous sulfate, 324 mg, Oral, Daily With Breakfast  levothyroxine, 50 mcg, Oral, Q AM  meropenem, 1,000 mg, Intravenous, Q12H  metoprolol succinate XL, 50 mg, Oral, Daily  predniSONE, 5 mg, Oral, Daily  saccharomyces boulardii, 250 mg, Oral, BID  sodium chloride, 10 mL, Intravenous, Q12H  tacrolimus, 1 mg, Oral, BID  vancomycin, 250 mg, Oral, Q6H        Infusion Meds  Pharmacy Consult,         PRN Meds    acetaminophen    Calcium Replacement - Follow Nurse / BPA Driven Protocol    carboxymethylcellulose sod    diazePAM    loperamide    Magnesium Standard Dose Replacement - Follow Nurse / BPA Driven Protocol    melatonin    ondansetron    Pharmacy Consult    Phosphorus Replacement - Follow Nurse / BPA Driven Protocol    Potassium Replacement - Follow Nurse / BPA Driven Protocol    [COMPLETED] Insert Peripheral IV **AND** sodium chloride    sodium chloride    sodium chloride        Assessment & Plan        Antimicrobial Therapy   1.  IV meropenem        2.  P.o. vancomycin        3.        4.        5.          Assessment     ESBL  E. coli bacteremia secondary to complicated UTI.  CT scan of abdomen pelvis showed no acute findings.  T     Complicated UTI with bacteremia.  Urine culture grew ESBL  E. coli.  Patient had recent episode of E. coli/ESBL  UTI and treated with p.o. fosfomycin     C. difficile colitis.  According to patient this is her second episode and she had 1 episode in 2013.  Currently has a fecal management system diarrhea is slowing down     Reactive leukocytosis secondary to above and steroids.  White count remained stable.  Trending down     History of COPD chronically on 4 L of oxygen via nasal cannula.  Currently on 3 L     History of kidney transplant in 2017 secondary to granulomatosis polyangiitis.  Currently on prednisone and Prograf     History of lung cancer in the past     Plan     Continue IV meropenem 1 g every 12 hours for total of 14 days-last day on June 20, 2024  Continue p.o. vancomycin  250 mg every 6 hours.  We will consider 3 weeks of treatment with p.o. vancomycin.  Continue probiotics  Continue supportive care  A.m. labs  Enteric isolation for C. difficile colitis  Contact isolation for ESBL  E. Coli  Appropriate PPE was placed on before entering the room  The case was discussed with the patient's son at bedside           Dirk Robin MD  06/10/24  12:41 EDT    Note is dictated utilizing voice recognition software/Dragon      Electronically signed by Dirk Robin MD at 06/10/24 1241       Naun Falcon MD at 06/10/24 075          RENAL/KCC:     LOS: 4 days    Patient Care Team:  Jonathan Luna APRN as PCP - General (Family Medicine)  Jak Gavin MD as Cardiologist (Cardiology)    Chief Complaint:  Fever    Subjective     Interval History:   Chart reviewed  Denies any CP or SOA  No worsening edema      Objective     Vital  "Sign Min/Max for last 24 hours  Temp  Min: 97 °F (36.1 °C)  Max: 97.8 °F (36.6 °C)   BP  Min: 114/57  Max: 133/60   Pulse  Min: 67  Max: 88   Resp  Min: 15  Max: 21   SpO2  Min: 97 %  Max: 98 %   Flow (L/min)  Min: 3  Max: 3   No data recorded     Flowsheet Rows      Flowsheet Row First Filed Value   Admission Height 167.6 cm (66\") Documented at 06/05/2024 1152   Admission Weight 71.3 kg (157 lb 3 oz) Documented at 06/05/2024 1152            No intake/output data recorded.  I/O last 3 completed shifts:  In: 1087 [P.O.:600; I.V.:487]  Out: 1075 [Urine:775; Stool:300]    Physical Exam:  GEN: Awake, NAD  ENT: PERRL, EOMI, MMM  NECK: Supple, no JVD  CHEST: CTAB, no W/R/C  CV: RRR, no M/G/R  ABD: Soft, NT, +BS  SKIN: Warm and Dry  NEURO: CN's intact      WBC WBC   Date Value Ref Range Status   06/10/2024 11.97 (H) 3.40 - 10.80 10*3/mm3 Final   06/09/2024 11.95 (H) 3.40 - 10.80 10*3/mm3 Final   06/08/2024 15.73 (H) 3.40 - 10.80 10*3/mm3 Final      HGB Hemoglobin   Date Value Ref Range Status   06/10/2024 10.6 (L) 12.0 - 15.9 g/dL Final   06/09/2024 11.2 (L) 12.0 - 15.9 g/dL Final   06/08/2024 10.8 (L) 12.0 - 15.9 g/dL Final      HCT Hematocrit   Date Value Ref Range Status   06/10/2024 35.3 34.0 - 46.6 % Final   06/09/2024 37.2 34.0 - 46.6 % Final   06/08/2024 35.1 34.0 - 46.6 % Final      Platlets No results found for: \"LABPLAT\"   MCV MCV   Date Value Ref Range Status   06/10/2024 94.6 79.0 - 97.0 fL Final   06/09/2024 94.4 79.0 - 97.0 fL Final   06/08/2024 93.1 79.0 - 97.0 fL Final          Sodium Sodium   Date Value Ref Range Status   06/10/2024 129 (L) 136 - 145 mmol/L Final   06/09/2024 130 (L) 136 - 145 mmol/L Final   06/08/2024 132 (L) 136 - 145 mmol/L Final      Potassium Potassium   Date Value Ref Range Status   06/10/2024 4.0 3.5 - 5.2 mmol/L Final   06/09/2024 4.4 3.5 - 5.2 mmol/L Final   06/08/2024 4.4 3.5 - 5.2 mmol/L Final     Comment:     Specimen hemolyzed.  Result may be falsely elevated.  Result checked " "  06/08/2024 3.2 (L) 3.5 - 5.2 mmol/L Final   06/07/2024 3.8 3.5 - 5.2 mmol/L Final     Comment:     Specimen hemolyzed.  Result may be falsely elevated.      Chloride Chloride   Date Value Ref Range Status   06/10/2024 94 (L) 98 - 107 mmol/L Final   06/09/2024 95 (L) 98 - 107 mmol/L Final   06/08/2024 94 (L) 98 - 107 mmol/L Final      CO2 CO2   Date Value Ref Range Status   06/10/2024 28.9 22.0 - 29.0 mmol/L Final   06/09/2024 28.3 22.0 - 29.0 mmol/L Final   06/08/2024 27.3 22.0 - 29.0 mmol/L Final      BUN BUN   Date Value Ref Range Status   06/10/2024 37 (H) 8 - 23 mg/dL Final   06/09/2024 45 (H) 8 - 23 mg/dL Final   06/08/2024 48 (H) 8 - 23 mg/dL Final      Creatinine Creatinine   Date Value Ref Range Status   06/10/2024 0.97 0.57 - 1.00 mg/dL Final   06/09/2024 1.23 (H) 0.57 - 1.00 mg/dL Final   06/08/2024 1.18 (H) 0.57 - 1.00 mg/dL Final      Calcium Calcium   Date Value Ref Range Status   06/10/2024 9.4 8.6 - 10.5 mg/dL Final   06/09/2024 9.5 8.6 - 10.5 mg/dL Final   06/08/2024 9.3 8.6 - 10.5 mg/dL Final      PO4 No results found for: \"CAPO4\"   Albumin Albumin   Date Value Ref Range Status   06/09/2024 3.2 (L) 3.5 - 5.2 g/dL Final   06/08/2024 3.2 (L) 3.5 - 5.2 g/dL Final      Magnesium Magnesium   Date Value Ref Range Status   06/08/2024 2.0 1.6 - 2.4 mg/dL Final      Uric Acid No results found for: \"URICACID\"        Results Review:     I reviewed the patient's new clinical results.    apixaban, 5 mg, Oral, Q12H  dilTIAZem, 30 mg, Oral, Q8H  DULoxetine, 40 mg, Oral, Daily  famotidine, 20 mg, Oral, Daily  ferrous sulfate, 324 mg, Oral, Daily With Breakfast  levothyroxine, 50 mcg, Oral, Q AM  meropenem, 1,000 mg, Intravenous, Q12H  metoprolol succinate XL, 50 mg, Oral, Daily  predniSONE, 5 mg, Oral, Daily  saccharomyces boulardii, 250 mg, Oral, BID  sodium chloride, 10 mL, Intravenous, Q12H  tacrolimus, 1 mg, Oral, BID  vancomycin, 250 mg, Oral, Q6H      Pharmacy Consult,         Medication Review: " Reviewed    Assessment & Plan     1) TONYA  2) DDKT  3) Chronic ImmunoRx  4) UTI - E. Coli/ESBL  5) ESBL Bacteremia  6) C. Diff  7) Lactic acidosis  8) Tachycardia  9) Hypokalemia  10) CHF  11) Hyponatremia     PLAN: Cr improved to 0.9 = in baseline range.  Holding diuretics for now.  ABX for UTI, bacteremia and C. Diff per primary/ID.  Jardiance D/C'd indefinitely.  Fluid restrict given lower Na.  F/U FK level.  Will follow.      Naun Falcon MD  [facility]  06/10/24  07:58 EDT        Electronically signed by Naun Falcon MD at 06/10/24 0823       Erlinda Mccabe APRN at 06/09/24 1625       Attestation signed by Dirk Robin MD at 06/10/24 1147    I have reviewed this documentation and agree.                  Infectious Diseases Progress Note      LOS: 3 days   Patient Care Team:  Jonathan Luna APRN as PCP - General (Family Medicine)  Jak Gavin MD as Cardiologist (Cardiology)    Chief Complaint: Diarrhea    Subjective     The had no fever during the last 24 hours.  Remained hemodynamically stable requiring no vasopressors.  Continues to have diarrhea and required fecal management system.  Denies significant abdominal pain nausea or vomiting.  Currently on 3 L of oxygen by nasal cannula    Review of Systems:   Review of Systems   Constitutional: Negative.    HENT: Negative.     Eyes: Negative.    Respiratory: Negative.     Cardiovascular: Negative.    Gastrointestinal:  Positive for diarrhea.   Genitourinary: Negative.    Musculoskeletal: Negative.    Skin: Negative.    Neurological: Negative.    Hematological: Negative.    Psychiatric/Behavioral: Negative.          Objective     Vital Signs  Temp:  [97.5 °F (36.4 °C)-97.8 °F (36.6 °C)] 97.8 °F (36.6 °C)  Heart Rate:  [88-93] 88  Resp:  [17-24] 21  BP: (114-133)/(50-60) 133/60    Physical Exam:  Physical Exam  Vitals and nursing note reviewed.   Constitutional:       Appearance: She is well-developed. She is  ill-appearing.   HENT:      Head: Normocephalic and atraumatic.   Eyes:      Pupils: Pupils are equal, round, and reactive to light.   Cardiovascular:      Rate and Rhythm: Normal rate and regular rhythm.      Heart sounds: Normal heart sounds.   Pulmonary:      Effort: Pulmonary effort is normal. No respiratory distress.      Breath sounds: Normal breath sounds. No wheezing or rales.   Abdominal:      General: Bowel sounds are normal. There is no distension.      Palpations: Abdomen is soft. There is no mass.      Tenderness: There is abdominal tenderness. There is no guarding or rebound.   Musculoskeletal:         General: No deformity. Normal range of motion.      Cervical back: Normal range of motion and neck supple.   Skin:     General: Skin is warm.      Findings: No erythema or rash.   Neurological:      Mental Status: She is alert and oriented to person, place, and time.      Cranial Nerves: No cranial nerve deficit.          Results Review:    I have reviewed all clinical data, test, lab, and imaging results.     Radiology  No Radiology Exams Resulted Within Past 24 Hours    Cardiology    Laboratory    Results from last 7 days   Lab Units 06/09/24  0423 06/08/24  0352 06/07/24  0522 06/06/24  0235 06/05/24  1223 06/03/24  0929   WBC 10*3/mm3 11.95* 15.73* 14.63* 19.84* 23.12* 13.71*   HEMOGLOBIN g/dL 11.2* 10.8* 10.6* 13.5 13.4 10.7*   HEMATOCRIT % 37.2 35.1 34.9 43.6 43.5 35.2   PLATELETS 10*3/mm3 201 205 199 229 271 222     Results from last 7 days   Lab Units 06/09/24  0423 06/08/24  1354 06/08/24  0352 06/07/24  1506 06/07/24  0522 06/06/24  0235 06/05/24  1223 06/03/24  0929   SODIUM mmol/L 130*  --  132*  --  131* 139 134* 135*   POTASSIUM mmol/L 4.4 4.4 3.2* 3.8 2.8* 3.2* 3.1* 3.7   CHLORIDE mmol/L 95*  --  94*  --  92* 93* 87* 98   CO2 mmol/L 28.3  --  27.3  --  28.8 29.6* 28.9 27.7   BUN mg/dL 45*  --  48*  --  42* 47* 50* 27*   CREATININE mg/dL 1.23*  --  1.18*  --  1.04* 1.16* 1.45* 0.76    GLUCOSE mg/dL 120*  --  121*  --  203* 201* 320* 151*   ALBUMIN g/dL 3.2*  --  3.2*  --   --   --  4.4  --    BILIRUBIN mg/dL 0.4  --  0.5  --   --   --  1.4*  --    ALK PHOS U/L 76  --  77  --   --   --  94  --    AST (SGOT) U/L 11  --  13  --   --   --  31  --    ALT (SGPT) U/L 7  --  8  --   --   --  17  --    CALCIUM mg/dL 9.5  --  9.3  --  9.4 10.2 10.3 10.3                 Microbiology   Microbiology Results (last 10 days)       Procedure Component Value - Date/Time    Clostridioides difficile Toxin - Stool, Per Rectum [[id number]]  (Abnormal) Collected: 06/07/24 0438    Lab Status: Final result Specimen: Stool from Per Rectum Updated: 06/07/24 0701    Narrative:      The following orders were created for panel order Clostridioides difficile Toxin - Stool, Per Rectum.  Procedure                               Abnormality         Status                     ---------                               -----------         ------                     Clostridioides difficile...[[id number]]  Abnormal            Final result                 Please view results for these tests on the individual orders.    Clostridioides difficile EIA - Stool, Per Rectum [[id number]]  (Abnormal) Collected: 06/07/24 0438    Lab Status: Final result Specimen: Stool from Per Rectum Updated: 06/07/24 0701     C Diff GDH Ag Positive     C.diff Toxin Ag Positive    Narrative:      DNA from a toxigenic strain of C.difficile was detected, along with the presence of free toxin. These results are suggestive of C.difficile infection, in context of an appropriate clinical scenario.    CANDIDA AURIS PCR - Swab, Axilla Right, Axilla Left and Groin [[id number]]  (Normal) Collected: 06/06/24 0848    Lab Status: Final result Specimen: Swab from Axilla Right, Axilla Left and Groin Updated: 06/07/24 0948     CANDIDA AURIS PCR Not Detected    Gastrointestinal Panel, PCR - Stool, Per Rectum [[id number]]  (Normal) Collected: 06/05/24 1818    Lab Status: Final result  Specimen: Stool from Per Rectum Updated: 06/05/24 2034     Campylobacter Not Detected     Plesiomonas shigelloides Not Detected     Salmonella Not Detected     Vibrio Not Detected     Vibrio cholerae Not Detected     Yersinia enterocolitica Not Detected     Enteroaggregative E. coli (EAEC) Not Detected     Enteropathogenic E. coli (EPEC) Not Detected     Enterotoxigenic E. coli (ETEC) lt/st Not Detected     Shiga-like toxin-producing E. coli (STEC) stx1/stx2 Not Detected     Shigella/Enteroinvasive E. coli (EIEC) Not Detected     Cryptosporidium Not Detected     Cyclospora cayetanensis Not Detected     Entamoeba histolytica Not Detected     Giardia lamblia Not Detected     Adenovirus F40/41 Not Detected     Astrovirus Not Detected     Norovirus GI/GII Not Detected     Rotavirus A Not Detected     Sapovirus (I, II, IV or V) Not Detected    Narrative:      If Aeromonas, Staphylococcus aureus or Bacillus cereus are suspected, please order [id number]: Stool Culture, Aeromonas, S aureus, B Cereus.    Respiratory Panel PCR w/COVID-19(SARS-CoV-2) HODAN/MARIA M/TWILA/PAD/COR/JENISE In-House, NP Swab in UTM/VTM, 2 HR TAT - Swab, Nasopharynx [[id number]]  (Normal) Collected: 06/05/24 1224    Lab Status: Final result Specimen: Swab from Nasopharynx Updated: 06/05/24 1326     ADENOVIRUS, PCR Not Detected     Coronavirus 229E Not Detected     Coronavirus HKU1 Not Detected     Coronavirus NL63 Not Detected     Coronavirus OC43 Not Detected     COVID19 Not Detected     Human Metapneumovirus Not Detected     Human Rhinovirus/Enterovirus Not Detected     Influenza A PCR Not Detected     Influenza B PCR Not Detected     Parainfluenza Virus 1 Not Detected     Parainfluenza Virus 2 Not Detected     Parainfluenza Virus 3 Not Detected     Parainfluenza Virus 4 Not Detected     RSV, PCR Not Detected     Bordetella pertussis pcr Not Detected     Bordetella parapertussis PCR Not Detected     Chlamydophila pneumoniae PCR Not Detected     Mycoplasma pneumo  by PCR Not Detected    Narrative:      In the setting of a positive respiratory panel with a viral infection PLUS a negative procalcitonin without other underlying concern for bacterial infection, consider observing off antibiotics or discontinuation of antibiotics and continue supportive care. If the respiratory panel is positive for atypical bacterial infection (Bordetella pertussis, Chlamydophila pneumoniae, or Mycoplasma pneumoniae), consider antibiotic de-escalation to target atypical bacterial infection.    Urine Culture - Urine, Straight Cath [[id number]]  (Abnormal)  (Susceptibility) Collected: 06/05/24 1224    Lab Status: Final result Specimen: Urine from Straight Cath Updated: 06/07/24 1039     Urine Culture >100,000 CFU/mL Escherichia coli ESBL     Comment:   Consider infectious disease consult.  Susceptibility results may not correlate to clinical outcomes.       Narrative:      Colonization of the urinary tract without infection is common. Treatment is discouraged unless the patient is symptomatic, pregnant, or undergoing an invasive urologic procedure.  Recent outcomes data supports the use of pip/tazo in the treatment of susceptible ESBL infections for uncomplicated UTI. Consider use of pip/tazo as a carbapenem-sparing regimen in applicable patients.    Susceptibility        Escherichia coli ESBL      ROBERT      Ertapenem Susceptible      Gentamicin Susceptible      Levofloxacin Resistant      Meropenem Susceptible      Nitrofurantoin Susceptible      Piperacillin + Tazobactam Susceptible      Trimethoprim + Sulfamethoxazole Resistant                           Blood Culture - Blood, Arm, Left [[id number]]  (Normal) Collected: 06/05/24 1223    Lab Status: Preliminary result Specimen: Blood from Arm, Left Updated: 06/09/24 1245     Blood Culture No growth at 4 days    Blood Culture - Blood, Arm, Left [[id number]]  (Abnormal)  (Susceptibility) Collected: 06/05/24 1223    Lab Status: Final result Specimen:  Blood from Arm, Left Updated: 06/09/24 0729     Blood Culture Escherichia coli ESBL     Comment:   Consider infectious disease consult.  Susceptibility results may not correlate to clinical outcomes.  For ESBL-producing infections in the blood, a carbapenem is recommended as first-line therapy for optimal clinical outcomes.        Isolated from Aerobic Bottle     Gram Stain Aerobic Bottle Gram negative bacilli    Narrative:      Less than seven (7) mL's of blood was collected.  Insufficient quantity may yield false negative results.    Susceptibility        Escherichia coli ESBL      ROBERT      Ertapenem Susceptible      Levofloxacin Resistant      Meropenem Susceptible      Trimethoprim + Sulfamethoxazole Susceptible                       Susceptibility Comments       Escherichia coli ESBL    Cefazolin sensitivity will not be reported for Enterobacteriaceae in non-urine isolates. If cefazolin is preferred, please call the microbiology lab to request an E-test.  With the exception of urinary-sourced infections, aminoglycosides should not be used as monotherapy.               Blood Culture ID, PCR - Blood, Arm, Left [[id number]]  (Abnormal) Collected: 06/05/24 1223    Lab Status: Final result Specimen: Blood from Arm, Left Updated: 06/07/24 0608     BCID, PCR Escherichia coli. Identification by BCID2 PCR.     BCID, PCR 2 CTX-M (ESBL) detected. Identification by BCID2 PCR     BOTTLE TYPE Aerobic Bottle            Medication Review:       Schedule Meds  apixaban, 5 mg, Oral, Q12H  dilTIAZem, 30 mg, Oral, Q8H  DULoxetine, 40 mg, Oral, Daily  famotidine, 20 mg, Oral, Daily  ferrous sulfate, 324 mg, Oral, Daily With Breakfast  levothyroxine, 50 mcg, Oral, Q AM  meropenem, 1,000 mg, Intravenous, Q12H  metoprolol succinate XL, 50 mg, Oral, Daily  predniSONE, 5 mg, Oral, Daily  saccharomyces boulardii, 250 mg, Oral, BID  sodium chloride, 10 mL, Intravenous, Q12H  tacrolimus, 1 mg, Oral, BID  vancomycin, 250 mg, Oral,  Q6H        Infusion Meds  Pharmacy Consult,         PRN Meds    acetaminophen    Calcium Replacement - Follow Nurse / BPA Driven Protocol    carboxymethylcellulose sod    diazePAM    loperamide    Magnesium Standard Dose Replacement - Follow Nurse / BPA Driven Protocol    melatonin    ondansetron    Pharmacy Consult    Phosphorus Replacement - Follow Nurse / BPA Driven Protocol    Potassium Replacement - Follow Nurse / BPA Driven Protocol    [COMPLETED] Insert Peripheral IV **AND** sodium chloride    sodium chloride    sodium chloride        Assessment & Plan       Antimicrobial Therapy   1.  IV meropenem        2.  P.o. vancomycin        3.        4.        5.          Assessment     ESBL  E. coli bacteremia secondary to complicated UTI.  CT scan of abdomen pelvis showed no acute findings.  T     Complicated UTI with bacteremia.  Urine culture grew ESBL  E. coli.  Patient had recent episode of E. coli/ESBL  UTI and treated with p.o. fosfomycin     C. difficile colitis.  According to patient this is her second episode and she had 1 episode in 2013.  Currently has a fecal management system diarrhea is slowing down     Reactive leukocytosis secondary to above and steroids.  White count remained stable.  Trending down     History of COPD chronically on 4 L of oxygen via nasal cannula.  Currently on 3 L     History of kidney transplant in 2017 secondary to granulomatosis polyangiitis.  Currently on prednisone and Prograf     History of lung cancer in the past     Plan     Continue IV meropenem 1 g every 12 hours for total of 14 days-last day on 6/20/2024  Continue p.o. vancomycin  250 mg every 6 hours.  We will consider 3 weeks of treatment with p.o. vancomycin.  Continue probiotics  Continue supportive care  A.m. labs  Enteric isolation for C. difficile colitis  Contact isolation for ESBL  E. Coli  Appropriate PPE was placed on before entering the room  Discussed with RN            TONYA Victoria  06/09/24  16:25 EDT    Note is dictated utilizing voice recognition software/Dragon      Electronically signed by Dirk Robin MD at 06/10/24 1147       Juanis Lowe MD at 06/09/24 1118               LOS: 3 days   Patient Care Team:  Jonathan Luna APRN as PCP - General (Family Medicine)  Jak Gavin MD as Cardiologist (Cardiology)    Subjective     Interval History: Slowly improving    Patient Complaints: Abdominal cramping has resolved, overall feeling slightly better with no new complaints    History taken from: patient    Review of Systems   Constitutional:  Positive for activity change, appetite change and fatigue. Negative for chills, diaphoresis and fever.   HENT:  Negative for facial swelling.    Eyes:  Negative for visual disturbance.   Respiratory:  Negative for cough, shortness of breath, wheezing and stridor.    Cardiovascular:  Negative for chest pain, palpitations and leg swelling.   Gastrointestinal:  Negative for abdominal pain, constipation, diarrhea, nausea and vomiting.   Endocrine: Negative for polyuria.   Genitourinary:  Negative for dysuria.   Musculoskeletal:  Positive for gait problem. Negative for arthralgias and back pain.   Neurological:  Positive for weakness. Negative for dizziness.   Psychiatric/Behavioral:  Negative for confusion.            Objective     Vital Signs  Temp:  [97.5 °F (36.4 °C)-97.8 °F (36.6 °C)] 97.7 °F (36.5 °C)  Heart Rate:  [83-93] 88  Resp:  [21-24] 22  BP: (114-123)/(53-64) 121/53    Physical Exam:     General Appearance:    Alert, cooperative, in no acute distress, chronically ill-appearing   Head:    Normocephalic, without obvious abnormality, atraumatic   Eyes:            Lids and lashes normal, conjunctivae and sclerae normal, no   icterus, no pallor, corneas clear, PERRLA   Ears:    Ears appear intact with no abnormalities noted   Throat:   No oral lesions, no thrush, oral mucosa moist   Neck:   No adenopathy, supple,  trachea midline, no thyromegaly, no   carotid bruit, no JVD   Lungs:     Clear to auscultation,respirations regular, even and                  unlabored    Heart:  Regular regular   Chest Wall:    No abnormalities observed   Abdomen:     Normal bowel sounds, no masses, no organomegaly, soft        Non-tender non-distended, no guarding,   Extremities:   Moves all extremities well, no edema, no cyanosis, no             Redness   Pulses:   Pulses palpable and equal bilaterally   Skin: Extensive superficial bruising from blood draws   Lymph nodes:   No palpable adenopathy   Neurologic: No localizing neurologic deficits, gait not assessed        Results Review:    Lab Results (last 24 hours)       Procedure Component Value Units Date/Time    Blood Culture - Blood, Arm, Left [[id number]]  (Abnormal)  (Susceptibility) Collected: 06/05/24 1223    Specimen: Blood from Arm, Left Updated: 06/09/24 0729     Blood Culture Escherichia coli ESBL     Comment:   Consider infectious disease consult.  Susceptibility results may not correlate to clinical outcomes.  For ESBL-producing infections in the blood, a carbapenem is recommended as first-line therapy for optimal clinical outcomes.        Isolated from Aerobic Bottle     Gram Stain Aerobic Bottle Gram negative bacilli    Narrative:      Less than seven (7) mL's of blood was collected.  Insufficient quantity may yield false negative results.    Susceptibility        Escherichia coli ESBL      ROBERT      Ertapenem Susceptible      Levofloxacin Resistant      Meropenem Susceptible      Trimethoprim + Sulfamethoxazole Susceptible                       Susceptibility Comments       Escherichia coli ESBL    Cefazolin sensitivity will not be reported for Enterobacteriaceae in non-urine isolates. If cefazolin is preferred, please call the microbiology lab to request an E-test.  With the exception of urinary-sourced infections, aminoglycosides should not be used as monotherapy.                Comprehensive Metabolic Panel [[id number]]  (Abnormal) Collected: 06/09/24 0423    Specimen: Blood Updated: 06/09/24 0449     Glucose 120 mg/dL      BUN 45 mg/dL      Creatinine 1.23 mg/dL      Sodium 130 mmol/L      Potassium 4.4 mmol/L      Chloride 95 mmol/L      CO2 28.3 mmol/L      Calcium 9.5 mg/dL      Total Protein 6.0 g/dL      Albumin 3.2 g/dL      ALT (SGPT) 7 U/L      AST (SGOT) 11 U/L      Alkaline Phosphatase 76 U/L      Total Bilirubin 0.4 mg/dL      Globulin 2.8 gm/dL      A/G Ratio 1.1 g/dL      BUN/Creatinine Ratio 36.6     Anion Gap 6.7 mmol/L      eGFR 45.4 mL/min/1.73     Narrative:      GFR Normal >60  Chronic Kidney Disease <60  Kidney Failure <15    The GFR formula is only valid for adults with stable renal function between ages 18 and 70.    CBC & Differential [[id number]]  (Abnormal) Collected: 06/09/24 0423    Specimen: Blood Updated: 06/09/24 0428    Narrative:      The following orders were created for panel order CBC & Differential.  Procedure                               Abnormality         Status                     ---------                               -----------         ------                     CBC Auto Differential[[id number]]        Abnormal            Final result                 Please view results for these tests on the individual orders.    CBC Auto Differential [[id number]]  (Abnormal) Collected: 06/09/24 0423    Specimen: Blood Updated: 06/09/24 0428     WBC 11.95 10*3/mm3      RBC 3.94 10*6/mm3      Hemoglobin 11.2 g/dL      Hematocrit 37.2 %      MCV 94.4 fL      MCH 28.4 pg      MCHC 30.1 g/dL      RDW 16.2 %      RDW-SD 57.1 fl      MPV 10.5 fL      Platelets 201 10*3/mm3      Neutrophil % 87.0 %      Lymphocyte % 4.7 %      Monocyte % 5.4 %      Eosinophil % 1.9 %      Basophil % 0.3 %      Immature Grans % 0.7 %      Neutrophils, Absolute 10.41 10*3/mm3      Lymphocytes, Absolute 0.56 10*3/mm3      Monocytes, Absolute 0.64 10*3/mm3      Eosinophils, Absolute 0.23  10*3/mm3      Basophils, Absolute 0.03 10*3/mm3      Immature Grans, Absolute 0.08 10*3/mm3      nRBC 0.0 /100 WBC     Tacrolimus Level [[id number]] Collected: 06/09/24 0423    Specimen: Blood Updated: 06/09/24 0426    Potassium [[id number]]  (Normal) Collected: 06/08/24 1354    Specimen: Blood Updated: 06/08/24 1534     Potassium 4.4 mmol/L      Comment: Specimen hemolyzed.  Result may be falsely elevated.  Result checked       Phosphorus [[id number]]  (Normal) Collected: 06/08/24 1354    Specimen: Blood Updated: 06/08/24 1532     Phosphorus 2.5 mg/dL     Blood Culture - Blood, Arm, Left [[id number]]  (Normal) Collected: 06/05/24 1223    Specimen: Blood from Arm, Left Updated: 06/08/24 1245     Blood Culture No growth at 3 days             Imaging Results (Last 24 Hours)       ** No results found for the last 24 hours. **                 I reviewed the patient's new clinical results.    Medication Review:   Scheduled Meds:apixaban, 5 mg, Oral, Q12H  dilTIAZem, 30 mg, Oral, Q8H  DULoxetine, 40 mg, Oral, Daily  famotidine, 20 mg, Oral, Daily  ferrous sulfate, 324 mg, Oral, Daily With Breakfast  levothyroxine, 50 mcg, Oral, Q AM  meropenem, 1,000 mg, Intravenous, Q12H  metoprolol succinate XL, 50 mg, Oral, Daily  predniSONE, 5 mg, Oral, Daily  saccharomyces boulardii, 250 mg, Oral, BID  sodium chloride, 10 mL, Intravenous, Q12H  tacrolimus, 1 mg, Oral, BID  vancomycin, 250 mg, Oral, Q6H      Continuous Infusions:dilTIAZem, 5-15 mg/hr, Last Rate: Stopped (06/07/24 0849)  Pharmacy Consult,       PRN Meds:.  acetaminophen    Calcium Replacement - Follow Nurse / BPA Driven Protocol    carboxymethylcellulose sod    diazePAM    loperamide    Magnesium Standard Dose Replacement - Follow Nurse / BPA Driven Protocol    melatonin    ondansetron    Pharmacy Consult    Phosphorus Replacement - Follow Nurse / BPA Driven Protocol    Potassium Replacement - Follow Nurse / BPA Driven Protocol    [COMPLETED] Insert Peripheral IV **AND**  sodium chloride    sodium chloride    sodium chloride     Assessment & Plan       UTI (urinary tract infection)    C. difficile colitis  -Continue oral vancomycin and probiotics; avoid proton pump inhibitors  E. coli bacteremia-continue meropenem  History of kidney transplant-continue antirejection medications  Immunocompromised secondary to medications  Chronic kidney disease stage III-creatinine stable  Leukocytosis-resolving  Permanent atrial fibs-continue diltiazem, anticoagulation  Chronic anemia-oral iron  Mood disorder-duloxetine  Hypothyroidism-levothyroxine        Plan for disposition: SNF at discharge    Juanis Lowe MD  24  11:18 EDT            Electronically signed by Juanis Lowe MD at 24 1122       Yvette Gallego MD at 24 0723           [facility]     Progress Note    Patient Name: Jaja Carcamo  : 1945  MRN: [id number]  Primary Care Physician:  Jonathan Luna APRN  Date of admission: 2024    Subjective   Subjective     Chief Complaint: DDKT    History of Present Illness  Patient with DDKT and UTI    Review of Systems    Objective   Objective     Vitals:   Temp:  [97.5 °F (36.4 °C)-97.8 °F (36.6 °C)] 97.7 °F (36.5 °C)  Heart Rate:  [83-93] 88  Resp:  [21-28] 22  BP: (114-128)/(53-89) 121/53  Flow (L/min):  [3] 3    Physical Exam   General Appearance:  In no acute distress.    HEENT: Normal HEENT exam.     Extremities: .  There is no deformity, effusion or dependent edema.    Neurological: Patient is alert     Result Review    Result Review:  I have personally reviewed the results from the time of this admission to 2024 07:23 EDT and agree with these findings:  []  Laboratory list / accordion  []  Microbiology  []  Radiology  []  EKG/Telemetry   []  Cardiology/Vascular   []  Pathology  []  Old records  []  Other:  Most notable findings include:       Assessment & Plan   Assessment / Plan     Brief Patient Summary:  Jaja Carcamo is a 79 y.o. female who has DDKT  and UTI    Active Hospital Problems:  Active Hospital Problems    Diagnosis     **UTI (urinary tract infection)     C. difficile colitis      Plan:   1) TONYA, improved and near baseline  2) DDKT  3) Chronic ImmunoRx  4) UTI - E. Coli/ESBL lactate level improving  5) ESBL Bacteremia  6) C. Diff  7) Lactic acidosis  8) Tachycardia  9) Hypokalemia, related to GI loss  10) CHF  11) Hyponatremia    Patient seen and examined. Awake, alert. No distress. Labs and chart reviewed. Cr stable at 1.2.  meds reviewed.  Will continue current. With inability to urinate. S/p straight cath. Wll monitor. May need pandey and urology frank Gallego MD               Electronically signed by Yvette Gallego MD at 06/09/24 0903       Consult Notes (last 48 hours)  Notes from 06/08/24 1658 through 06/10/24 1658   No notes of this type exist for this encounter.

## 2024-06-10 NOTE — DISCHARGE PLACEMENT REQUEST
"Gene Carcamo (79 y.o. Female)       Date of Birth   1945    Social Security Number       Address   [address] [place] [address] [place] [address]    Home Phone   [phone]    MRN   [id number]       Judaism   None    Marital Status                               Admission Date   6/5/24    Admission Type   Emergency    Admitting Provider   Juanis Lowe MD    Attending Provider   Juanis Lowe MD    Department, Room/Bed   [facility] UN[facility], 2206/1       Discharge Date       Discharge Disposition       Discharge Destination                                 Attending Provider: Juanis Lowe MD    Allergies: Zolpidem    Isolation: Contact, Spore   Infection: ESBL (06/07/24), C.difficile (06/07/24), ESBL E coli (06/07/24)   Code Status: CPR    Ht: 167.6 cm (66\")   Wt: 62.5 kg (137 lb 12.6 oz)    Admission Cmt: None   Principal Problem: UTI (urinary tract infection) [N39.0]                   Active Insurance as of 6/5/2024       Primary Coverage       Payor Plan Insurance Group Employer/Plan Group    [facility] MEDICARE REPLACEMENT [facility] MED ADV GROUP [id number]       Payor Plan Address Payor Plan Phone Number Payor Plan Fax Number Effective Dates    [address] [address]   1/1/2024 - None Entered    [facility] [id number]         Subscriber Name Subscriber Birth Date Member ID       GENE CARCAMO 1945 [id number]                     Emergency Contacts        (Rel.) Home Phone Work Phone Mobile Phone    RENNY CARCAMO (Son) [phone] -- [phone]    goldy potter (Grandchild) [phone] -- [phone]    OnealDamaris (Sister) -- -- [phone]                 Physical Therapy Notes (all)        Max Chong PT at 06/08/24 1834  Version 1 of 1         Goal Outcome Evaluation:  Plan of Care Reviewed With: patient           Outcome Evaluation: Pt is a 78 YO F with a h/o chronic HFpEF, atrial fibrillation, COPD, non-obstructive CAD, asthma, HTN, kidney " transplant and lung cancer who presented to the ER with shortness of breath and weakness. Pt has had multiple hospitalizations within the last month, as well as falls and has been at SNF. Pt with CDiff this date and has FMS currently. Pt states she lives home alone, typically is independent with ADLs, and ambualtion using RWx , but her son has been providing necessary care recently. Pt this date demonstrates impaired mobility, requiring MIN A x2 for transfers to standing and to bedside chair. Pt is well below baseline and not safe to return home, recommendation is SNF at d/c.      Anticipated Discharge Disposition (PT): skilled nursing facility                          Electronically signed by Max Chong, PT at 24       Max Chong, PT at 24  Version 1 of 1         Patient Name: Jaja Carcamo  : 1945    MRN: [id number]                              Today's Date: 2024       Admit Date: 2024    Visit Dx:     ICD-10-CM ICD-9-CM   1. Urinary tract infection without hematuria, site unspecified  N39.0 599.0   2. Sepsis, due to unspecified organism, unspecified whether acute organ dysfunction present  A41.9 038.9     995.91   3. Renal insufficiency  N28.9 593.9     Patient Active Problem List   Diagnosis    COPD (chronic obstructive pulmonary disease)    Acute UTI    Atrial fibrillation with rapid ventricular response    Volume overload    Fall    Hypothyroidism, unspecified    Hyperlipidemia    History of suicide attempt    History of lobectomy of lung    Irritable bowel syndrome    History of kidney transplant    Long term (current) use of immunosuppressive biologic    Long term current use of anticoagulant therapy    Menopausal flushing    Mitral valve regurgitation    Tricuspid valve regurgitation    History of lung cancer    Nonobstructive atherosclerosis of coronary artery    Obsessive-compulsive disorder    Osteoarthritis of left hip    Primary osteoarthritis of right hip     Paroxysmal supraventricular tachycardia    Peripheral neuropathy    History of DVT (deep vein thrombosis)    Personal history of peptic ulcer disease    Pulmonary hypertension    Seasonal allergies    History of repair of hip joint    Gout    Gastroesophageal reflux disease    Hearing loss    Edema of both lower extremities due to peripheral venous insufficiency    Mixed anxiety and depressive disorder    Chronic pain    Chronic hypoxemic respiratory failure    Chronic diarrhea    Asthma    Osteoarthritis    Allergic rhinitis    Wegener's granulomatosis with renal involvement    Essential (primary) hypertension    Paroxysmal atrial fibrillation    Valvular heart disease    Acute exacerbation of CHF (congestive heart failure)    UTI (urinary tract infection)    C. difficile colitis     Past Medical History:   Diagnosis Date    Allergic rhinitis 04/14/2015    Asthma 08/10/2017    Atrial flutter 05/11/2017    Chronic diarrhea 04/15/2019    Chronic hypoxemic respiratory failure 01/18/2024    Chronic pain 02/03/2015    COPD (chronic obstructive pulmonary disease)     COVID-19 virus detected 08/11/2022    Cytokine release syndrome, grade 1 08/16/2022    Edema of both lower extremities due to peripheral venous insufficiency 03/12/2021    ESRD on hemodialysis 05/22/2013    Essential (primary) hypertension 03/03/2022    Fracture of ulnar styloid 05/19/2022    Fracture of unspecified carpal bone, right wrist, subsequent encounter for fracture with routine healing 03/03/2022    Gastroesophageal reflux disease 11/12/2020    Gout 08/10/2017    Hearing loss 08/10/2017    History of appendectomy     History of DVT (deep vein thrombosis) 11/12/2020    History of kidney transplant 06/30/2017    History of lobectomy of lung 01/18/2024 03/08/2016:  RIGHT Lower Lobe Mass--> Right Video-assisted thoracoscopy with a moderate-to-large wedge resection of the RLL (by Dr. Haris Mcconnell @ [facility])--> Poorly differentiated  carcinoma of the RLL.      History of repair of hip joint 05/21/2013    History of suicide attempt 05/20/2024    Hyperlipidemia 08/11/2014    Hypothyroidism, unspecified 03/03/2022    Infection due to extended spectrum beta lactamase (ESBL) producing bacteria 03/11/2016    No A2K system hx. +ESBL E coli urine on 3/11/16.      Irritable bowel syndrome 04/15/2019    Long term (current) use of immunosuppressive biologic 04/28/2021    Long term current use of anticoagulant therapy 01/18/2024    Malignant neoplasm of lung 08/10/2017    Menopausal flushing 08/30/2021    Mitral valve regurgitation 07/06/2015    Mixed anxiety and depressive disorder 04/30/2015    Non-small cell lung cancer 08/10/2017    Nonobstructive atherosclerosis of coronary artery 06/02/2019 08/12/2022: CATH: CarlVictor Hugo: NSTEMI assoc with Covid-19: LM:-nl;  LAD: diffuse, Ca++; 30%; CIRC: Dominant. Normal; RCA: small; 50% diffuse, proximal and mid-segment.      NSTEMI (non-ST elevated myocardial infarction) 08/11/2022    Obsessive-compulsive disorder 04/15/2019    Osteoarthritis 05/20/2024    Paroxysmal atrial fibrillation 05/11/2017    Paroxysmal supraventricular tachycardia 05/11/2017    Peripheral neuropathy 08/11/2014    Personal history of peptic ulcer disease 11/12/2020    Postoperative anemia due to acute blood loss 05/22/2013    Right wrist fracture 03/01/2022    Seasonal allergies 08/10/2017    Secondary hyperparathyroidism of renal origin 09/14/2015    Tricuspid valve regurgitation 03/15/2017    Ulcer of lower extremity 08/30/2021    Unspecified acute appendicitis 03/03/2022    Valvular heart disease 05/20/2024    Wegener's granulomatosis with renal involvement 03/03/2022     Past Surgical History:   Procedure Laterality Date    APPENDECTOMY      BREAST SURGERY Left     cysts rmeoved    CARDIAC CATHETERIZATION Right 08/12/2022    Procedure: Left Heart Cath and coronary angiogram;  Surgeon: Jak Gavin MD;  Location: [facility]  INVASIVE LOCATION;  Service: Cardiology;  Laterality: Right;    CLOSED REDUCTION WRIST FRACTURE Right 03/01/2022    Procedure: WRIST CLOSED REDUCTION;  Surgeon: Gaudencio Townsend MD;  Location: [facility] MAIN OR;  Service: Orthopedics;  Laterality: Right;    CYSTOSCOPY      HIP ARTHROPLASTY      HYSTERECTOMY      LUNG LOBECTOMY Right     TRANSPLANTATION RENAL        General Information       Row Name 06/08/24 1816          Physical Therapy Time and Intention    Document Type evaluation  -EL     Mode of Treatment individual therapy;physical therapy  -       Row Name 06/08/24 1816          General Information    Prior Level of Function independent:  son provides assistance as needed.  -EL     Existing Precautions/Restrictions fall  -EL     Barriers to Rehab medically complex  -       Row Name 06/08/24 1816          Home Main Entrance    Number of Stairs, Main Entrance three  -EL     Stair Railings, Main Entrance railings safe and in good condition  -       Row Name 06/08/24 1816          Stairs Within Home, Primary    Number of Stairs, Within Home, Primary none  -       Row Name 06/08/24 1816          Cognition    Orientation Status (Cognition) oriented x 4  -       Row Name 06/08/24 1816          Safety Issues, Functional Mobility    Impairments Affecting Function (Mobility) balance;strength;pain;endurance/activity tolerance  -               User Key  (r) = Recorded By, (t) = Taken By, (c) = Cosigned By      Initials Name Provider Type    EL Max Chong PT Physical Therapist                   Mobility       Row Name 06/08/24 1818          Bed Mobility    Bed Mobility supine-sit  -EL     Supine-Sit [place] (Bed Mobility) minimum assist (75% patient effort)  -EL     Assistive Device (Bed Mobility) bed rails;head of bed elevated  -       Row Name 06/08/24 1818          Bed-Chair Transfer    Bed-Chair [place] (Transfers) 2 person assist;minimum assist (75% patient effort);moderate assist (50% patient  effort)  -EL     Comment, (Bed-Chair Transfer) will benefit from RWx usage.  -       Row Name 06/08/24 1818          Sit-Stand Transfer    Sit-Stand [place] (Transfers) minimum assist (75% patient effort);2 person assist  -EL               User Key  (r) = Recorded By, (t) = Taken By, (c) = Cosigned By      Initials Name Provider Type    Max Jarvis, PT Physical Therapist                   Obj/Interventions       [facility] Name 06/08/24 1819          Range of Motion Comprehensive    General Range of Motion bilateral lower extremity ROM WFL  -[facility] Name 06/08/24 1819          Strength Comprehensive (MMT)    General Manual Muscle Testing (MMT) Assessment lower extremity strength deficits identified  -EL     Comment, General Manual Muscle Testing (MMT) Assessment BLE 3/5 gross  -[facility] Name 06/08/24 1819          Sensory Assessment (Somatosensory)    Sensory Assessment (Somatosensory) sensation intact  -               User Key  (r) = Recorded By, (t) = Taken By, (c) = Cosigned By      Initials Name Provider Type    Max Jarvis, PT Physical Therapist                   Goals/Plan       Row Name 06/08/24 1825          Bed Mobility Goal 1 (PT)    Activity/Assistive Device (Bed Mobility Goal 1, PT) bed mobility activities, all  -EL     [place]enn Level/Cues Needed (Bed Mobility Goal 1, PT) modified independence  -EL     Time Frame (Bed Mobility Goal 1, PT) long term goal (LTG);2 weeks  -[facility] Name 06/08/24 1825          Transfer Goal 1 (PT)    Activity/Assistive Device (Transfer Goal 1, PT) transfers, all;walker, rolling  -EL     [place]enn Level/Cues Needed (Transfer Goal 1, PT) supervision required  -EL     Time Frame (Transfer Goal 1, PT) long term goal (LTG);2 weeks  -EL       Row Name 06/08/24 1825          Gait Training Goal 1 (PT)    Activity/Assistive Device (Gait Training Goal 1, PT) gait (walking locomotion);walker, rolling  -EL     [place]enn Level (Gait Training Goal 1, PT)  supervision required  -EL     Distance (Gait Training Goal 1, PT) 50  -EL     Time Frame (Gait Training Goal 1, PT) long term goal (LTG);2 weeks  -EL       Row Name 06/08/24 1825          Therapy Assessment/Plan (PT)    Planned Therapy Interventions (PT) neuromuscular re-education;balance training;bed mobility training;transfer training;gait training;patient/family education;strengthening  -EL               User Key  (r) = Recorded By, (t) = Taken By, (c) = Cosigned By      Initials Name Provider Type    Max Jarvis, PT Physical Therapist                   Clinical Impression       Row Name 06/08/24 1820          Pain    Pretreatment Pain Rating 3/10  -EL     Posttreatment Pain Rating 3/10  -EL     Pain Location generalized  -EL       Row Name 06/08/24 1820          Plan of Care Review    Plan of Care Reviewed With patient  -EL     Outcome Evaluation Pt is a 80 YO F with a h/o chronic HFpEF, atrial fibrillation, COPD, non-obstructive CAD, asthma, HTN, kidney transplant and lung cancer who presented to the ER with shortness of breath and weakness. Pt has had multiple hospitalizations within the last month, as well as falls and has been at SNF. Pt with CDiff this date and has FMS currently. Pt states she lives home alone, typically is independent with ADLs, and ambualtion using RWx , but her son has been providing necessary care recently. Pt this date demonstrates impaired mobility, requiring MIN A x2 for transfers to standing and to bedside chair. Pt is well below baseline and not safe to return home, recommendation is SNF at d/c.  -EL       Row Name 06/08/24 1820          Therapy Assessment/Plan (PT)    Rehab Potential (PT) good, to achieve stated therapy goals  -EL     Criteria for Skilled Interventions Met (PT) yes  -EL     Therapy Frequency (PT) 5 times/wk  -EL     Predicted Duration of Therapy Intervention (PT) until d/c  -EL       Row Name 06/08/24 1820          Vital Signs    O2 Delivery Pre Treatment room  air  -EL     O2 Delivery Intra Treatment room air  -EL     O2 Delivery Post Treatment room air  -EL     Pre Patient Position Supine  -EL     Intra Patient Position Standing  -EL     Post Patient Position Sitting  -EL       Row Name 06/08/24 1820          Positioning and Restraints    Pre-Treatment Position in bed  -EL     Post Treatment Position chair  -EL     In Chair notified nsg;reclined;call light within reach;encouraged to call for assist;exit alarm on  -EL               User Key  (r) = Recorded By, (t) = Taken By, (c) = Cosigned By      Initials Name Provider Type    Max Jarvis, PT Physical Therapist                   Outcome Measures       Row Name 06/08/24 1826 06/08/24 1605       How much help from another person do you currently need...    Turning from your back to your side while in flat bed without using bedrails? 3  -EL 3  -MH    Moving from lying on back to sitting on the side of a flat bed without bedrails? 2  -EL --    Moving to and from a bed to a chair (including a wheelchair)? 2  -EL --    Standing up from a chair using your arms (e.g., wheelchair, bedside chair)? 2  -EL --    Climbing 3-5 steps with a railing? 1  -EL --    To walk in hospital room? 1  -EL --    AM-PAC 6 Clicks Score (PT) 11  -EL --    Highest Level of Mobility Goal 4 --> Transfer to chair/commode  -EL --      Row Name 06/08/24 0815          How much help from another person do you currently need...    Turning from your back to your side while in flat bed without using bedrails? 3  -GK     Moving from lying on back to sitting on the side of a flat bed without bedrails? 3  -GK     Moving to and from a bed to a chair (including a wheelchair)? 3  -GK     Standing up from a chair using your arms (e.g., wheelchair, bedside chair)? 3  -GK     Climbing 3-5 steps with a railing? 1  -GK     To walk in hospital room? 1  -GK     AM-PAC 6 Clicks Score (PT) 14  -GK     Highest Level of Mobility Goal 4 --> Transfer to chair/commode  -GK        Row Name 06/08/24 1826          Functional Assessment    Outcome Measure Options AM-PAC 6 Clicks Basic Mobility (PT)  -EL               User Key  (r) = Recorded By, (t) = Taken By, (c) = Cosigned By      Initials Name Provider Type     Theresa Lazaro, OT Occupational Therapist    Max Jarvis, PT Physical Therapist    Amy Brown, RN Registered Nurse                                 Physical Therapy Education       Title: PT OT SLP Therapies (In Progress)       Topic: Physical Therapy (Done)       Point: Mobility training (Done)       Learning Progress Summary             Patient Acceptance, E,TB, VU by  at 6/8/2024 1826                         Point: Precautions (Done)       Learning Progress Summary             Patient Acceptance, E,TB, VU by EL at 6/8/2024 1826                                         User Key       Initials Effective Dates Name Provider Type Discipline     06/23/20 -  Max Chong, PT Physical Therapist PT                  PT Recommendation and Plan  Planned Therapy Interventions (PT): neuromuscular re-education, balance training, bed mobility training, transfer training, gait training, patient/family education, strengthening  Plan of Care Reviewed With: patient  Outcome Evaluation: Pt is a 80 YO F with a h/o chronic HFpEF, atrial fibrillation, COPD, non-obstructive CAD, asthma, HTN, kidney transplant and lung cancer who presented to the ER with shortness of breath and weakness. Pt has had multiple hospitalizations within the last month, as well as falls and has been at SNF. Pt with CDiff this date and has FMS currently. Pt states she lives home alone, typically is independent with ADLs, and ambualtion using RWx , but her son has been providing necessary care recently. Pt this date demonstrates impaired mobility, requiring MIN A x2 for transfers to standing and to bedside chair. Pt is well below baseline and not safe to return home, recommendation is SNF at d/c.     Time  Calculation:   PT Evaluation Complexity  History, PT Evaluation Complexity: 1-2 personal factors and/or comorbidities  Examination of Body Systems (PT Eval Complexity): total of 3 or more elements  Clinical Presentation (PT Evaluation Complexity): evolving  Clinical Decision Making (PT Evaluation Complexity): moderate complexity  Overall Complexity (PT Evaluation Complexity): moderate complexity     PT Charges       Row Name 24 1834             Time Calculation    Start Time 1319  -EL      Stop Time 1340  -EL      Time Calculation (min) 21 min  -EL      PT Received On 24  -EL      PT - Next Appointment 06/10/24  -EL      PT Goal Re-Cert Due Date 24  -EL                User Key  (r) = Recorded By, (t) = Taken By, (c) = Cosigned By      Initials Name Provider Type    Max Jarvis PT Physical Therapist                  Therapy Charges for Today       Code Description Service Date Service Provider Modifiers Qty    61202186967 HC PT EVAL MOD COMPLEXITY 4 2024 Max Chong, PT GP 1            PT G-Codes  Outcome Measure Options: AM-PAC 6 Clicks Basic Mobility (PT)  AM-PAC 6 Clicks Score (PT): 11  PT Discharge Summary  Anticipated Discharge Disposition (PT): skilled nursing facility    Max Chong PT  2024      Electronically signed by Max Chong PT at 24 1835          Occupational Therapy Notes (all)        Theresa Lazaro, OT at 24 1609          Patient Name: Jaja Carcamo  : 1945    MRN: [id number]                              Today's Date: 2024       Admit Date: 2024    Visit Dx:     ICD-10-CM ICD-9-CM   1. Urinary tract infection without hematuria, site unspecified  N39.0 599.0   2. Sepsis, due to unspecified organism, unspecified whether acute organ dysfunction present  A41.9 038.9     995.91   3. Renal insufficiency  N28.9 593.9     Patient Active Problem List   Diagnosis    COPD (chronic obstructive pulmonary disease)    Acute UTI    Atrial fibrillation with  rapid ventricular response    Volume overload    Fall    Hypothyroidism, unspecified    Hyperlipidemia    History of suicide attempt    History of lobectomy of lung    Irritable bowel syndrome    History of kidney transplant    Long term (current) use of immunosuppressive biologic    Long term current use of anticoagulant therapy    Menopausal flushing    Mitral valve regurgitation    Tricuspid valve regurgitation    History of lung cancer    Nonobstructive atherosclerosis of coronary artery    Obsessive-compulsive disorder    Osteoarthritis of left hip    Primary osteoarthritis of right hip    Paroxysmal supraventricular tachycardia    Peripheral neuropathy    History of DVT (deep vein thrombosis)    Personal history of peptic ulcer disease    Pulmonary hypertension    Seasonal allergies    History of repair of hip joint    Gout    Gastroesophageal reflux disease    Hearing loss    Edema of both lower extremities due to peripheral venous insufficiency    Mixed anxiety and depressive disorder    Chronic pain    Chronic hypoxemic respiratory failure    Chronic diarrhea    Asthma    Osteoarthritis    Allergic rhinitis    Wegener's granulomatosis with renal involvement    Essential (primary) hypertension    Paroxysmal atrial fibrillation    Valvular heart disease    Acute exacerbation of CHF (congestive heart failure)    UTI (urinary tract infection)    C. difficile colitis     Past Medical History:   Diagnosis Date    Allergic rhinitis 04/14/2015    Asthma 08/10/2017    Atrial flutter 05/11/2017    Chronic diarrhea 04/15/2019    Chronic hypoxemic respiratory failure 01/18/2024    Chronic pain 02/03/2015    COPD (chronic obstructive pulmonary disease)     COVID-19 virus detected 08/11/2022    Cytokine release syndrome, grade 1 08/16/2022    Edema of both lower extremities due to peripheral venous insufficiency 03/12/2021    ESRD on hemodialysis 05/22/2013    Essential (primary) hypertension 03/03/2022    Fracture of  ulnar styloid 05/19/2022    Fracture of unspecified carpal bone, right wrist, subsequent encounter for fracture with routine healing 03/03/2022    Gastroesophageal reflux disease 11/12/2020    Gout 08/10/2017    Hearing loss 08/10/2017    History of appendectomy     History of DVT (deep vein thrombosis) 11/12/2020    History of kidney transplant 06/30/2017    History of lobectomy of lung 01/18/2024 03/08/2016:  RIGHT Lower Lobe Mass--> Right Video-assisted thoracoscopy with a moderate-to-large wedge resection of the RLL (by Dr. Haris Mcconnell @ [facility])--> Poorly differentiated carcinoma of the RLL.      History of repair of hip joint 05/21/2013    History of suicide attempt 05/20/2024    Hyperlipidemia 08/11/2014    Hypothyroidism, unspecified 03/03/2022    Infection due to extended spectrum beta lactamase (ESBL) producing bacteria 03/11/2016    No A2K system hx. +ESBL E coli urine on 3/11/16.      Irritable bowel syndrome 04/15/2019    Long term (current) use of immunosuppressive biologic 04/28/2021    Long term current use of anticoagulant therapy 01/18/2024    Malignant neoplasm of lung 08/10/2017    Menopausal flushing 08/30/2021    Mitral valve regurgitation 07/06/2015    Mixed anxiety and depressive disorder 04/30/2015    Non-small cell lung cancer 08/10/2017    Nonobstructive atherosclerosis of coronary artery 06/02/2019 08/12/2022: CATH: Carl-Victor Hugo: NSTEMI assoc with Covid-19: LM:-nl;  LAD: diffuse, Ca++; 30%; CIRC: Dominant. Normal; RCA: small; 50% diffuse, proximal and mid-segment.      NSTEMI (non-ST elevated myocardial infarction) 08/11/2022    Obsessive-compulsive disorder 04/15/2019    Osteoarthritis 05/20/2024    Paroxysmal atrial fibrillation 05/11/2017    Paroxysmal supraventricular tachycardia 05/11/2017    Peripheral neuropathy 08/11/2014    Personal history of peptic ulcer disease 11/12/2020    Postoperative anemia due to acute blood loss 05/22/2013    Right wrist fracture  03/01/2022    Seasonal allergies 08/10/2017    Secondary hyperparathyroidism of renal origin 09/14/2015    Tricuspid valve regurgitation 03/15/2017    Ulcer of lower extremity 08/30/2021    Unspecified acute appendicitis 03/03/2022    Valvular heart disease 05/20/2024    Wegener's granulomatosis with renal involvement 03/03/2022     Past Surgical History:   Procedure Laterality Date    APPENDECTOMY      BREAST SURGERY Left     cysts rmeoved    CARDIAC CATHETERIZATION Right 08/12/2022    Procedure: Left Heart Cath and coronary angiogram;  Surgeon: Jak Gavin MD;  Location: [facility] CATH INVASIVE LOCATION;  Service: Cardiology;  Laterality: Right;    CLOSED REDUCTION WRIST FRACTURE Right 03/01/2022    Procedure: WRIST CLOSED REDUCTION;  Surgeon: Gaudencio Townsend MD;  Location: [facility] MAIN OR;  Service: Orthopedics;  Laterality: Right;    CYSTOSCOPY      HIP ARTHROPLASTY      HYSTERECTOMY      LUNG LOBECTOMY Right     TRANSPLANTATION RENAL        General Information       Row Name 06/08/24 1548          General Information    Prior Level of Function independent:  Reports she doesn't leave her house much, that son helps her on the steps if she does leave. Neighbors come to mow the lawn, Son shops for her & takes out the trash.  -     Existing Precautions/Restrictions fall;other (see comments)  [facility]  -     Barriers to Rehab medically complex;previous functional deficit  -       Row Name 06/08/24 1548          Living Environment    People in Home alone  -       Row Name 06/08/24 1548          Home Main Entrance    Number of Stairs, Main Entrance three  -     Stair Railings, Main Entrance railings safe and in good condition  -       Row Name 06/08/24 1548          Stairs Within Home, Primary    Number of Stairs, Within Home, Primary none  -       Row Name 06/08/24 1548          Cognition    Orientation Status (Cognition) oriented x 4  -       Row Name 06/08/24 1548          Safety Issues, Functional Mobility     Impairments Affecting Function (Mobility) balance;strength;pain;endurance/activity tolerance  -               User Key  (r) = Recorded By, (t) = Taken By, (c) = Cosigned By      Initials Name Provider Type     Theresa Lazaro, OT Occupational Therapist                     Mobility/ADL's       Row Name 06/08/24 [facility]          Bed Mobility    Bed Mobility supine-sit  -     Supine-Sit [place] (Bed Mobility) minimum assist (75% patient effort)  -     Assistive Device (Bed Mobility) bed rails;head of bed elevated  -[facility] Name 06/08/24 1552          Transfers    Transfers bed-chair transfer  -MH       Row Name 06/08/24 [facility]          Bed-Chair Transfer    Bed-Chair [place] (Transfers) 2 person assist;minimum assist (75% patient effort);moderate assist (50% patient effort)  -     Comment, (Bed-Chair Transfer) needs to use RW, legs buckling but could offload onto arms if RW were used.  -[facility] Name 06/08/24 1552          Functional Mobility    Functional Mobility- Safety Issues balance decreased during turns;weight-shifting ability decreased;step length decreased  -[facility] Name 06/08/24 1552          Activities of Daily Living    BADL Assessment/Intervention lower body dressing;grooming;toileting  -[facility] Name 06/08/24 1552          Lower Body Dressing Assessment/Training    [place] Level (Lower Body Dressing) lower body dressing skills;dependent (less than 25% patient effort)  -     Position (Lower Body Dressing) supine  -[facility] Name 06/08/24 1552          Grooming Assessment/Training    [place] Level (Grooming) hair care, combing/brushing;dependent (less than 25% patient effort)  -[facility] Name 06/08/24 [facility]          Toileting Assessment/Training    [place] Level (Toileting) toileting skills;dependent (less than 25% patient effort)  -     Position (Toileting) supine  -               User Key  (r) = Recorded By, (t) = Taken By, (c) = Cosigned By       Initials Name Provider Type     Theresa Lazaro OT Occupational Therapist                   Obj/Interventions       Row Name 06/08/24 1553          Sensory Assessment (Somatosensory)    Sensory Assessment (Somatosensory) sensation intact  -MH       Row Name 06/08/24 1553          Vision Assessment/Intervention    Visual Impairment/Limitations corrective lenses full-time  -MH       Row Name 06/08/24 1553          Range of Motion Comprehensive    Comment, General Range of Motion trunk flex limited 25%  -MH       Row Name 06/08/24 1553          Strength Comprehensive (MMT)    Comment, General Manual Muscle Testing (MMT) Assessment BUE 4-/5, BLE 3/5  -               User Key  (r) = Recorded By, (t) = Taken By, (c) = Cosigned By      Initials Name Provider Type     Theresa Lazaro OT Occupational Therapist                   Goals/Plan       Row Name 06/08/24 1603          Transfer Goal 1 (OT)    Activity/Assistive Device (Transfer Goal 1, OT) transfers, all;walker, rolling  -     [place] Level/Cues Needed (Transfer Goal 1, OT) minimum assist (75% or more patient effort)  -     Time Frame (Transfer Goal 1, OT) 2 weeks  -MH       Row Name 06/08/24 1603          Dressing Goal 1 (OT)    Activity/Device (Dressing Goal 1, OT) dressing skills, all  -     [place]/Cues Needed (Dressing Goal 1, OT) moderate assist (50-74% patient effort)  -     Time Frame (Dressing Goal 1, OT) 2 weeks  -MH       Row Name 06/08/24 1603          Toileting Goal 1 (OT)    Activity/Device (Toileting Goal 1, OT) toileting skills, all  -     [place] Level/Cues Needed (Toileting Goal 1, OT) moderate assist (50-74% patient effort)  -     Time Frame (Toileting Goal 1, OT) 2 weeks  -MH       Row Name 06/08/24 1603          Grooming Goal 1 (OT)    Activity/Device (Grooming Goal 1, OT) grooming skills, all  -     [place] (Grooming Goal 1, OT) minimum assist (75% or more patient effort)  -     Time Frame (Grooming Goal  1, OT) 2 weeks  -       Row Name 06/08/24 1608          Therapy Assessment/Plan (OT)    Planned Therapy Interventions (OT) activity tolerance training;BADL retraining;cognitive/visual perception retraining;occupation/activity based interventions;patient/caregiver education/training;transfer/mobility retraining;ROM/therapeutic exercise  -               User Key  (r) = Recorded By, (t) = Taken By, (c) = Cosigned By      Initials Name Provider Type     Theresa Lazaro, KIMBER Occupational Therapist                   Clinical Impression       Row Name 06/08/24 9502          Pain Assessment    Pretreatment Pain Rating 3/10  -     Posttreatment Pain Rating 3/10  -     Pre/Posttreatment Pain Comment fecal tube, raw skin  -       Row Name 06/08/24 6517          Plan of Care Review    Plan of Care Reviewed With patient  -     Progress improving  -     Outcome Evaluation 79 y.o. female with a h/o chronic HFpEF, atrial fibrillation, COPD, non-obstructive CAD, asthma, HTN, kidney transplant and lung cancer who presented to the ER with shortness of breath and weakness.  She was also noted to have bilateral lower extremity edema.  Patient was recently hospitalized for UTI and A fib with RVR.  She was discharged on 5/22/24, and she refused discharge to a SNF as recommended.  She also refused home health and outpatient PT.  Pt states generally she is independent with mobility, ambulation with RWx and has had recent falls. Pt reports she normally stays in her home unless her son assists her down the few steps out. Pt is seen by OT in the [facility] with a fecal management system placed due to her CDIFF symptoms. Pt states she is starting to feel a little better but still has no appetite. She was able to get up to the chair and her vital signs were stable during and after an ambulatory transfer. Pt sat EOB well but needed min to mod (A) of two persons to step to the chair. Recommend she use RW in the future for trnasfers and  ambulation. Pt will need rehab at d/c. She is not safe to go home at d/c. Pt agrees. Recommend SNF.  -       Row Name 06/08/24 1553          Therapy Assessment/Plan (OT)    Rehab Potential (OT) good, to achieve stated therapy goals  -     Criteria for Skilled Therapeutic Interventions Met (OT) skilled treatment is necessary  -     Therapy Frequency (OT) 3 times/wk  -     Predicted Duration of Therapy Intervention (OT) until d/c  -       Row Name 06/08/24 1554          Therapy Plan Review/Discharge Plan (OT)    Anticipated Discharge Disposition (OT) skilled nursing facility  -       Row Name 06/08/24 1554          Vital Signs    Pre Systolic BP Rehab 98  -MH     Pre Treatment Diastolic BP 54  -MH     Intra Systolic BP Rehab 115  -MH     Intra Treatment Diastolic BP 58  -MH     O2 Delivery Pre Treatment room air  -     O2 Delivery Intra Treatment room air  -     O2 Delivery Post Treatment room air  -     Pre Patient Position Supine  -     Intra Patient Position Standing  -     Post Patient Position Sitting  -       Row Name 06/08/24 1553          Positioning and Restraints    Pre-Treatment Position in bed  -     Post Treatment Position chair  -     In Chair call light within reach;reclined;exit alarm on;encouraged to call for assist;with ns  -               User Key  (r) = Recorded By, (t) = Taken By, (c) = Cosigned By      Initials Name Provider Type    Theresa Vargas, OT Occupational Therapist                   Outcome Measures       Row Name 06/08/24 1605 06/08/24 0815       How much help from another person do you currently need...    Turning from your back to your side while in flat bed without using bedrails? 3  - 3  -GK    Moving from lying on back to sitting on the side of a flat bed without bedrails? -- 3  -GK    Moving to and from a bed to a chair (including a wheelchair)? -- 3  -GK    Standing up from a chair using your arms (e.g., wheelchair, bedside chair)? -- 3  -GK     Climbing 3-5 steps with a railing? -- 1  -    To walk in hospital room? -- 1  -GK    AM-PAC 6 Clicks Score (PT) -- 14  -    Highest Level of Mobility Goal -- 4 --> Transfer to chair/commode  -              User Key  (r) = Recorded By, (t) = Taken By, (c) = Cosigned By      Initials Name Provider Type     Theresa Lazaro OT Occupational Therapist    Amy Brown RN Registered Nurse                    Occupational Therapy Education       Title: PT OT SLP Therapies (In Progress)       Topic: Occupational Therapy (In Progress)       Point: ADL training (Done)       Description:   Instruct learner(s) on proper safety adaptation and remediation techniques during self care or transfers.   Instruct in proper use of assistive devices.                  Learning Progress Summary             Patient Acceptance, E,D, VU,DU,NR by  at 6/8/2024 1607                         Point: Home exercise program (Not Started)       Description:   Instruct learner(s) on appropriate technique for monitoring, assisting and/or progressing therapeutic exercises/activities.                  Learner Progress:  Not documented in this visit.              Point: Precautions (Done)       Description:   Instruct learner(s) on prescribed precautions during self-care and functional transfers.                  Learning Progress Summary             Patient Acceptance, E,D, VU,DU,NR by  at 6/8/2024 1607                         Point: Body mechanics (Done)       Description:   Instruct learner(s) on proper positioning and spine alignment during self-care, functional mobility activities and/or exercises.                  Learning Progress Summary             Patient Acceptance, E,D, VU,DU,NR by  at 6/8/2024 1607                                         User Key       Initials Effective Dates Name Provider Type [facility] 06/16/21 -  Theresa Lazaro OT Occupational Therapist OT                  OT Recommendation and Plan  Planned  Therapy Interventions (OT): activity tolerance training, BADL retraining, cognitive/visual perception retraining, occupation/activity based interventions, patient/caregiver education/training, transfer/mobility retraining, ROM/therapeutic exercise  Therapy Frequency (OT): 3 times/wk  Plan of Care Review  Plan of Care Reviewed With: patient  Progress: improving  Outcome Evaluation: 79 y.o. female with a h/o chronic HFpEF, atrial fibrillation, COPD, non-obstructive CAD, asthma, HTN, kidney transplant and lung cancer who presented to the ER with shortness of breath and weakness.  She was also noted to have bilateral lower extremity edema.  Patient was recently hospitalized for UTI and A fib with RVR.  She was discharged on 5/22/24, and she refused discharge to a SNF as recommended.  She also refused home health and outpatient PT.  Pt states generally she is independent with mobility, ambulation with RWx and has had recent falls. Pt reports she normally stays in her home unless her son assists her down the few steps out. Pt is seen by OT in the [facility] with a fecal management system placed due to her CDIFF symptoms. Pt states she is starting to feel a little better but still has no appetite. She was able to get up to the chair and her vital signs were stable during and after an ambulatory transfer. Pt sat EOB well but needed min to mod (A) of two persons to step to the chair. Recommend she use RW in the future for trnasfers and ambulation. Pt will need rehab at d/c. She is not safe to go home at d/c. Pt agrees. Recommend SNF.     Time Calculation:         Time Calculation- OT       Row Name 06/08/24 4377             Time Calculation- OT    OT Start Time 1319  -      OT Stop Time 1340  -      OT Time Calculation (min) 21 min  -      Total Timed Code Minutes- OT 0 minute(s)  -      OT Received On 06/08/24  -      OT - Next Appointment 06/10/24  -      OT Goal Re-Cert Due Date 06/22/24  -                User Key   (r) = Recorded By, (t) = Taken By, (c) = Cosigned By      Initials Name Provider Type     Theresa Lazaro OT Occupational Therapist                  Therapy Charges for Today       Code Description Service Date Service Provider Modifiers Qty    [id number]  OT EVAL HIGH COMPLEXITY 3 6/8/2024 Theresa Lazaro OT GO 1                 Theresa Lazaro OT  6/8/2024    Electronically signed by Theresa Lazaro OT at 06/08/24 2097       Theresa Lazaro OT at 06/08/24 5308          Goal Outcome Evaluation:  Plan of Care Reviewed With: patient        Progress: improving  Outcome Evaluation: 79 y.o. female with a h/o chronic HFpEF, atrial fibrillation, COPD, non-obstructive CAD, asthma, HTN, kidney transplant and lung cancer who presented to the ER with shortness of breath and weakness.  She was also noted to have bilateral lower extremity edema.  Patient was recently hospitalized for UTI and A fib with RVR.  She was discharged on 5/22/24, and she refused discharge to a SNF as recommended.  She also refused home health and outpatient PT.  Pt states generally she is independent with mobility, ambulation with RWx and has had recent falls. Pt reports she normally stays in her home unless her son assists her down the few steps out. Pt is seen by OT in the [facility] with a fecal management system placed due to her CDIFF symptoms. Pt states she is starting to feel a little better but still has no appetite. She was able to get up to the chair and her vital signs were stable during and after an ambulatory transfer. Pt sat EOB well but needed min to mod (A) of two persons to step to the chair. Recommend she use RW in the future for trnasfers and ambulation. Pt will need rehab at d/c. She is not safe to go home at d/c. Pt agrees. Recommend SNF.      Anticipated Discharge Disposition (OT): skilled nursing facility                          Electronically signed by Theresa Lazaro OT at 06/08/24 9717       Speech Language Pathology Notes  (all)    No notes exist for this encounter.

## 2024-06-11 LAB
ALBUMIN SERPL-MCNC: 3.1 G/DL (ref 3.5–5.2)
ALBUMIN/GLOB SERPL: 1.2 G/DL
ALP SERPL-CCNC: 72 U/L (ref 39–117)
ALT SERPL W P-5'-P-CCNC: 8 U/L (ref 1–33)
ANION GAP SERPL CALCULATED.3IONS-SCNC: 4.4 MMOL/L (ref 5–15)
AST SERPL-CCNC: 15 U/L (ref 1–32)
BASOPHILS # BLD AUTO: 0.05 10*3/MM3 (ref 0–0.2)
BASOPHILS NFR BLD AUTO: 0.4 % (ref 0–1.5)
BILIRUB SERPL-MCNC: 0.4 MG/DL (ref 0–1.2)
BUN SERPL-MCNC: 27 MG/DL (ref 8–23)
BUN/CREAT SERPL: 34.6 (ref 7–25)
CALCIUM SPEC-SCNC: 9.8 MG/DL (ref 8.6–10.5)
CHLORIDE SERPL-SCNC: 98 MMOL/L (ref 98–107)
CO2 SERPL-SCNC: 29.6 MMOL/L (ref 22–29)
CREAT SERPL-MCNC: 0.78 MG/DL (ref 0.57–1)
DEPRECATED RDW RBC AUTO: 54.2 FL (ref 37–54)
EGFRCR SERPLBLD CKD-EPI 2021: 78.3 ML/MIN/1.73
EOSINOPHIL # BLD AUTO: 0.33 10*3/MM3 (ref 0–0.4)
EOSINOPHIL NFR BLD AUTO: 2.8 % (ref 0.3–6.2)
ERYTHROCYTE [DISTWIDTH] IN BLOOD BY AUTOMATED COUNT: 15.9 % (ref 12.3–15.4)
GLOBULIN UR ELPH-MCNC: 2.6 GM/DL
GLUCOSE SERPL-MCNC: 98 MG/DL (ref 65–99)
HCT VFR BLD AUTO: 35.2 % (ref 34–46.6)
HGB BLD-MCNC: 10.8 G/DL (ref 12–15.9)
IMM GRANULOCYTES # BLD AUTO: 0.43 10*3/MM3 (ref 0–0.05)
IMM GRANULOCYTES NFR BLD AUTO: 3.6 % (ref 0–0.5)
LYMPHOCYTES # BLD AUTO: 0.89 10*3/MM3 (ref 0.7–3.1)
LYMPHOCYTES NFR BLD AUTO: 7.5 % (ref 19.6–45.3)
MCH RBC QN AUTO: 28.6 PG (ref 26.6–33)
MCHC RBC AUTO-ENTMCNC: 30.7 G/DL (ref 31.5–35.7)
MCV RBC AUTO: 93.1 FL (ref 79–97)
MONOCYTES # BLD AUTO: 0.92 10*3/MM3 (ref 0.1–0.9)
MONOCYTES NFR BLD AUTO: 7.8 % (ref 5–12)
NEUTROPHILS NFR BLD AUTO: 77.9 % (ref 42.7–76)
NEUTROPHILS NFR BLD AUTO: 9.23 10*3/MM3 (ref 1.7–7)
NRBC BLD AUTO-RTO: 0 /100 WBC (ref 0–0.2)
PLATELET # BLD AUTO: 207 10*3/MM3 (ref 140–450)
PMV BLD AUTO: 9.3 FL (ref 6–12)
POTASSIUM SERPL-SCNC: 4.1 MMOL/L (ref 3.5–5.2)
PROT SERPL-MCNC: 5.7 G/DL (ref 6–8.5)
RBC # BLD AUTO: 3.78 10*6/MM3 (ref 3.77–5.28)
SODIUM SERPL-SCNC: 132 MMOL/L (ref 136–145)
TACROLIMUS BLD LC/MS/MS-MCNC: 10.9 NG/ML (ref 2–20)
WBC NRBC COR # BLD AUTO: 11.85 10*3/MM3 (ref 3.4–10.8)

## 2024-06-11 PROCEDURE — 97110 THERAPEUTIC EXERCISES: CPT

## 2024-06-11 PROCEDURE — 63710000001 PREDNISONE PER 5 MG

## 2024-06-11 PROCEDURE — 63710000001 TACROLIMUS PER 1 MG

## 2024-06-11 PROCEDURE — 97116 GAIT TRAINING THERAPY: CPT

## 2024-06-11 PROCEDURE — 97530 THERAPEUTIC ACTIVITIES: CPT

## 2024-06-11 PROCEDURE — 63710000001 TACROLIMUS PER 1 MG: Performed by: INTERNAL MEDICINE

## 2024-06-11 PROCEDURE — 25010000002 MEROPENEM PER 100 MG: Performed by: NURSE PRACTITIONER

## 2024-06-11 PROCEDURE — 97535 SELF CARE MNGMENT TRAINING: CPT

## 2024-06-11 PROCEDURE — 85025 COMPLETE CBC W/AUTO DIFF WBC: CPT | Performed by: INTERNAL MEDICINE

## 2024-06-11 PROCEDURE — 80053 COMPREHEN METABOLIC PANEL: CPT | Performed by: INTERNAL MEDICINE

## 2024-06-11 RX ORDER — DIAZEPAM 5 MG/1
10 TABLET ORAL NIGHTLY
Status: DISCONTINUED | OUTPATIENT
Start: 2024-06-11 | End: 2024-06-13 | Stop reason: HOSPADM

## 2024-06-11 RX ORDER — TACROLIMUS 0.5 MG/1
0.5 CAPSULE ORAL 2 TIMES DAILY
Status: DISCONTINUED | OUTPATIENT
Start: 2024-06-11 | End: 2024-06-13 | Stop reason: HOSPADM

## 2024-06-11 RX ORDER — CHOLESTYRAMINE LIGHT 4 G/5.7G
1 POWDER, FOR SUSPENSION ORAL EVERY 12 HOURS SCHEDULED
Status: DISCONTINUED | OUTPATIENT
Start: 2024-06-11 | End: 2024-06-13 | Stop reason: HOSPADM

## 2024-06-11 RX ORDER — DIPHENHYDRAMINE HCL 25 MG
25 CAPSULE ORAL NIGHTLY PRN
Status: DISCONTINUED | OUTPATIENT
Start: 2024-06-11 | End: 2024-06-13 | Stop reason: HOSPADM

## 2024-06-11 RX ADMIN — TACROLIMUS 1 MG: 1 CAPSULE ORAL at 09:17

## 2024-06-11 RX ADMIN — FAMOTIDINE 20 MG: 20 TABLET, FILM COATED ORAL at 09:17

## 2024-06-11 RX ADMIN — MEROPENEM 1000 MG: 1 INJECTION, POWDER, FOR SOLUTION INTRAVENOUS at 18:08

## 2024-06-11 RX ADMIN — Medication 250 MG: at 21:39

## 2024-06-11 RX ADMIN — DULOXETINE HYDROCHLORIDE 40 MG: 20 CAPSULE, DELAYED RELEASE ORAL at 09:17

## 2024-06-11 RX ADMIN — LEVOTHYROXINE SODIUM 50 MCG: 0.05 TABLET ORAL at 06:03

## 2024-06-11 RX ADMIN — DILTIAZEM HYDROCHLORIDE 30 MG: 30 TABLET, FILM COATED ORAL at 06:03

## 2024-06-11 RX ADMIN — Medication 250 MG: at 09:17

## 2024-06-11 RX ADMIN — DILTIAZEM HYDROCHLORIDE 30 MG: 30 TABLET, FILM COATED ORAL at 21:39

## 2024-06-11 RX ADMIN — DILTIAZEM HYDROCHLORIDE 30 MG: 30 TABLET, FILM COATED ORAL at 14:07

## 2024-06-11 RX ADMIN — ACETAMINOPHEN 650 MG: 325 TABLET, FILM COATED ORAL at 10:40

## 2024-06-11 RX ADMIN — APIXABAN 5 MG: 5 TABLET, FILM COATED ORAL at 21:38

## 2024-06-11 RX ADMIN — CHOLESTYRAMINE 4 G: 4 POWDER, FOR SUSPENSION ORAL at 21:39

## 2024-06-11 RX ADMIN — APIXABAN 5 MG: 5 TABLET, FILM COATED ORAL at 09:17

## 2024-06-11 RX ADMIN — MEROPENEM 1000 MG: 1 INJECTION, POWDER, FOR SOLUTION INTRAVENOUS at 06:03

## 2024-06-11 RX ADMIN — METOPROLOL SUCCINATE 50 MG: 50 TABLET, EXTENDED RELEASE ORAL at 09:17

## 2024-06-11 RX ADMIN — TACROLIMUS 0.5 MG: 0.5 CAPSULE ORAL at 21:38

## 2024-06-11 RX ADMIN — VANCOMYCIN HYDROCHLORIDE 250 MG: 250 CAPSULE ORAL at 12:43

## 2024-06-11 RX ADMIN — Medication 10 ML: at 09:21

## 2024-06-11 RX ADMIN — VANCOMYCIN HYDROCHLORIDE 250 MG: 250 CAPSULE ORAL at 06:03

## 2024-06-11 RX ADMIN — FERROUS SULFATE TAB EC 324 MG (65 MG FE EQUIVALENT) 324 MG: 324 (65 FE) TABLET DELAYED RESPONSE at 09:17

## 2024-06-11 RX ADMIN — PREDNISONE 5 MG: 5 TABLET ORAL at 09:17

## 2024-06-11 RX ADMIN — Medication 5 MG: at 21:54

## 2024-06-11 RX ADMIN — VANCOMYCIN HYDROCHLORIDE 250 MG: 250 CAPSULE ORAL at 18:08

## 2024-06-11 RX ADMIN — DIAZEPAM 10 MG: 5 TABLET ORAL at 21:38

## 2024-06-11 NOTE — SIGNIFICANT NOTE
Case Management/Social Work    Patient Name:  Jaja Carcamo  YOB: 1945  MRN: [id number]  Admit Date:  6/5/2024 06/11/24 1038   Post Acute Pre-Cert Documentation   Request Submitted by Facility - Type: Hospital   Post-Acute Authorization Type Submitted: SNF   Date Post Acute Pre-Cert Inititated per Facility 06/10/24   Verification from Payer Yes   Date Post Acute Pre-Cert Completed 06/11/24   Accepting Facility [facility] Discharge Date Requested 06/11/24   All Clinicals Submitted? Yes   Had Accepting Facility at Time of Submission Yes   Response Received from Insurance? Approval   Response Communicated to: Accepting Facility Liaison   Authorization Number: [id number]   Post Acute Pre-Cert Initiated Comment CM submitted precer through [facility] for [facility] SNF on 6/10/2024. Precert approved 6/11/2024 -6/13/2024 on 6/11/2024. Auth ID # [id number]. CM notified Ethan GARY CMA, and facility liaison of approval.   New Pre-Cert Needed? No           Electronically signed by:  Karina Teresa RN  06/11/24 10:38 EDT    Office Phone: [phone]  Office Cell:     [phone]

## 2024-06-11 NOTE — CASE MANAGEMENT/SOCIAL WORK
Continued Stay Note   [place]     Patient Name: Jaja Carcamo  MRN: [id number]  Today's Date: 6/11/2024    Admit Date: 6/5/2024    Plan: DC Plan: Return to [facility] accepted and following. Precert started 6/10/2024 and approved 6/11/2024.  No PASRR needed. Current with [facility] for Home O2@3-4 liters. Will need transport.   Discharge Plan       Row Name 06/11/24 1043       Plan    Plan DC Plan: Return to [facility] accepted and following. Precert started 6/10/2024 and approved 6/11/2024.  No PASRR needed. Current with [facility] for Home O2@3-4 liters. Will need transport.    Patient/Family in Agreement with Plan yes    Provided Post Acute Provider List? N/A    Provided Post Acute Provider Quality & Resource List? N/A    Plan Comments CM reviewed chart documentation for clinical updates. CM checked [employer] website for precert status. Precert approved 6/11/2024. Auth ID # [id number]. CM informed Kurt Hall CMA, MD, and nursing of approval.CM will continue to follow for any further needs and adjust discharge plan accordingly. DC Barriers: Cardiac monitoring, Rectal Tube, and monitor labs.                 Expected Discharge Date and Time       Expected Discharge Date Expected Discharge Time    Jun 11, 2024           Phone communication or documentation only- no physical contact with patient or family.      Karina Teresa RN    Office Phone: [phone]  Office Cell:     [phone]

## 2024-06-11 NOTE — THERAPY TREATMENT NOTE
"Subjective: Pt agreeable to therapeutic plan of care.    Objective:     Bed mobility - Supine to sitting Mod-A    Transfers - x4 STS Mod-A and with rolling walker    Ambulation - 3 feet Mod-A and with rolling walker. Pt with notable B knee weakness and poor upright balance. Pt became incontinent of bowel while taking steps towards recliner chair.     ADLs - DEP for pericare while standing using RW and mod A for upright support.     Therapeutic Exercise - 15 Reps B LE AROM lying supine    Vitals: WNL    Pain: 0 VAS   Location:   Intervention for pain: N/A    Education: Provided education on the importance of mobility in the acute care setting, Verbal/Tactile Cues, ADL training, Transfer Training, and Gait Training    Assessment: Jaja Carcamo presents with functional mobility impairments which indicate the need for skilled intervention. Tolerating session today without incident. Pt with impaired global strength, balance, and activity tolerance. Pt not safe to return home, as she is at a high risk for falls and/or immobility complications. Pt will require SNF placement at discharge. Will continue to follow and progress as tolerated.     Plan/Recommendations:   If medically appropriate, Moderate Intensity Therapy recommended post-acute care. This is recommended as therapy feels the patient would require 3-4 days per week and wouldn't tolerate \"3 hour daily\" rehab intensity. SNF would be the preferred choice. If the patient does not agree to SNF, arrange HH or OP depending on home bound status. If patient is medically complex, consider LTACH. Pt requires no DME at discharge.     Pt desires Skilled Rehab placement at discharge. Pt cooperative; agreeable to therapeutic recommendations and plan of care.         Basic Mobility 6-click:  Rollin = Total, A lot = 2, A little = 3; 4 = None  Supine>Sit:   1 = Total, A lot = 2, A little = 3; 4 = None   Sit>Stand with arms:  1 = Total, A lot = 2, A little = 3; 4 = " None  Bed>Chair:   1 = Total, A lot = 2, A little = 3; 4 = None  Ambulate in room:  1 = Total, A lot = 2, A little = 3; 4 = None  3-5 Steps with railin = Total, A lot = 2, A little = 3; 4 = None  Score: 11    Modified [place]: N/A = No pre-op stroke/TIA    Post-Tx Position: Up in Chair, Staff Present, Alarms activated, and Call light and personal items within reach  PPE: gloves and gown

## 2024-06-11 NOTE — PROGRESS NOTES
"     LOS: 4 days   Patient Care Team:  Jonathan Luna APRN as PCP - General (Family Medicine)  Jak Gavin MD as Cardiologist (Cardiology)    Subjective     Interval History: Slowly improving    Patient Complaints: Generally weak.  Still has FMS in place for loose stools.  Pt states she is \"tired\" and \"almost ready to give up\" due to several recent illnesses that have led to hospitalization.  She does not want to change code status because she is afraid her son would be upset with her.  I offered  visit.  She declined.    History taken from: patient    Review of Systems   Constitutional:  Positive for activity change, appetite change and fatigue. Negative for chills, diaphoresis and fever.   HENT:  Negative for facial swelling.    Eyes:  Negative for visual disturbance.   Respiratory:  Negative for cough and shortness of breath.    Cardiovascular:  Negative for leg swelling.   Endocrine: Negative for polyuria.   Genitourinary:  Negative for dysuria.   Neurological:  Positive for weakness. Negative for tremors and headaches.   Psychiatric/Behavioral:  Negative for confusion.            Objective     Vital Signs  Temp:  [97 °F (36.1 °C)-97.6 °F (36.4 °C)] 97.5 °F (36.4 °C)  Heart Rate:  [66-88] 75  Resp:  [15-22] 22  BP: (115-133)/(56-71) 125/58    Physical Exam:     General Appearance:    Alert, cooperative, in no acute distress,   Head:    Normocephalic, without obvious abnormality, atraumatic   Eyes:            Lids and lashes normal, conjunctivae and sclerae normal, no   icterus, no pallor, corneas clear, PERRLA   Ears:    Ears appear intact with no abnormalities noted   Throat:   No oral lesions, no thrush, oral mucosa moist   Neck:   No adenopathy, supple, trachea midline, no thyromegaly, no   carotid bruit, no JVD   Lungs:     Clear to auscultation,respirations regular, even and                  unlabored    Heart:    Regular rhythm and normal rate, normal S1 and S2, no            murmur, no gallop, " no rub, no click   Chest Wall:    No abnormalities observed   Abdomen:     Normal bowel sounds, no masses, no organomegaly, soft        Non-tender non-distended, no guarding,   Extremities:   Moves all extremities well, no edema, no cyanosis, no             Redness   Pulses:   Pulses palpable and equal bilaterally   Skin:   No bleeding, bruising or rash   Lymph nodes:   No palpable adenopathy   Neurologic:   Cranial nerves 2 - 12 grossly intact, sensation intact, DTR       present and equal bilaterally        Results Review:    Lab Results (last 24 hours)       Procedure Component Value Units Date/Time    Blood Culture - Blood, Arm, Left [[id number]]  (Normal) Collected: 06/05/24 1223    Specimen: Blood from Arm, Left Updated: 06/10/24 1246     Blood Culture No growth at 5 days    Basic Metabolic Panel [[id number]]  (Abnormal) Collected: 06/10/24 0320    Specimen: Blood Updated: 06/10/24 0351     Glucose 106 mg/dL      BUN 37 mg/dL      Creatinine 0.97 mg/dL      Sodium 129 mmol/L      Potassium 4.0 mmol/L      Chloride 94 mmol/L      CO2 28.9 mmol/L      Calcium 9.4 mg/dL      BUN/Creatinine Ratio 38.1     Anion Gap 6.1 mmol/L      eGFR 60.3 mL/min/1.73     Narrative:      GFR Normal >60  Chronic Kidney Disease <60  Kidney Failure <15    The GFR formula is only valid for adults with stable renal function between ages 18 and 70.    CBC & Differential [[id number]]  (Abnormal) Collected: 06/10/24 0320    Specimen: Blood Updated: 06/10/24 0329    Narrative:      The following orders were created for panel order CBC & Differential.  Procedure                               Abnormality         Status                     ---------                               -----------         ------                     CBC Auto Differential[[id number]]        Abnormal            Final result                 Please view results for these tests on the individual orders.    CBC Auto Differential [[id number]]  (Abnormal) Collected: 06/10/24  0320    Specimen: Blood Updated: 06/10/24 0329     WBC 11.97 10*3/mm3      RBC 3.73 10*6/mm3      Hemoglobin 10.6 g/dL      Hematocrit 35.3 %      MCV 94.6 fL      MCH 28.4 pg      MCHC 30.0 g/dL      RDW 15.9 %      RDW-SD 55.0 fl      MPV 9.6 fL      Platelets 219 10*3/mm3      Neutrophil % 80.5 %      Lymphocyte % 7.6 %      Monocyte % 6.7 %      Eosinophil % 2.9 %      Basophil % 0.3 %      Immature Grans % 2.0 %      Neutrophils, Absolute 9.64 10*3/mm3      Lymphocytes, Absolute 0.91 10*3/mm3      Monocytes, Absolute 0.80 10*3/mm3      Eosinophils, Absolute 0.35 10*3/mm3      Basophils, Absolute 0.03 10*3/mm3      Immature Grans, Absolute 0.24 10*3/mm3      nRBC 0.0 /100 WBC              Imaging Results (Last 24 Hours)       ** No results found for the last 24 hours. **                 I reviewed the patient's new clinical results.    Medication Review:   Scheduled Meds:apixaban, 5 mg, Oral, Q12H  dilTIAZem, 30 mg, Oral, Q8H  DULoxetine, 40 mg, Oral, Daily  famotidine, 20 mg, Oral, Daily  ferrous sulfate, 324 mg, Oral, Daily With Breakfast  levothyroxine, 50 mcg, Oral, Q AM  meropenem, 1,000 mg, Intravenous, Q12H  metoprolol succinate XL, 50 mg, Oral, Daily  predniSONE, 5 mg, Oral, Daily  saccharomyces boulardii, 250 mg, Oral, BID  sodium chloride, 10 mL, Intravenous, Q12H  tacrolimus, 1 mg, Oral, BID  vancomycin, 250 mg, Oral, Q6H      Continuous Infusions:Pharmacy Consult,       PRN Meds:.  acetaminophen    Calcium Replacement - Follow Nurse / BPA Driven Protocol    carboxymethylcellulose sod    diazePAM    loperamide    Magnesium Standard Dose Replacement - Follow Nurse / BPA Driven Protocol    melatonin    ondansetron    Pharmacy Consult    Phosphorus Replacement - Follow Nurse / BPA Driven Protocol    Potassium Replacement - Follow Nurse / BPA Driven Protocol    [COMPLETED] Insert Peripheral IV **AND** sodium chloride    sodium chloride    sodium chloride     Assessment & Plan       UTI (urinary tract  infection)    C. difficile colitis   -Continue oral vancomycin and probiotics; avoid proton pump inhibitors  E. coli bacteremia-continue meropenem  History of kidney transplant-continue antirejection medications  Immunocompromised secondary to medications  Chronic kidney disease stage III-creatinine stable  Leukocytosis-resolving  Permanent atrial fibs-continue diltiazem, anticoagulation  Chronic anemia-oral iron  Mood disorder-duloxetine  Hypothyroidism-levothyroxine    Precert started for return to SNF.      Plan for disposition:[facility]    Juanis Lowe MD  06/10/24  20:05 EDT

## 2024-06-11 NOTE — CASE MANAGEMENT/SOCIAL WORK
"Social Work Assessment  [facility]     Patient Name: Jaja Carcamo  MRN: [id number]  Today's Date: 6/11/2024    Admit Date: 6/5/2024     Discharge Needs Assessment    No documentation.                  Discharge Plan       Row Name 06/11/24 1549       Plan    Plan Comments LSW met with Pt at bedside to complete LACE screen. Pt reported that she lives alone at home. Pt stated that her family is very busy and unable to come over to help often. Pt stated when she is feeling bad she is not able to clean her home or cook a meal. LSW discussed [facility] but pt declined. Pt reported that she has two children, but one of her children, her daughter passed away 16 years ago. Pt son lives near by and provides assistance. Pt stated her  passed away 4 years ago and she suffers from a lot of grief. Pt takes medication but stated, \"I am not sure how good it works\". Pt stated her PCP manages her medication. LSW discussed grief support but pt declined any resources at this time. Pt reported that she is of Evangelical kenn and has a good support system. Pt stated she does not drink alcohol and quit smoking 17 years ago. Pt worried that she will have to go back to SNF at discharge. LACE 15                Psychosocial       Row Name 06/11/24 1541       Values/Beliefs    Spiritual, Cultural Beliefs, Roman Catholic Practices, Values that Affect Care no    Values/Beliefs Comment Evangelical       Behavior WDL    Behavior WDL WDL       Emotion Mood WDL    Emotion/Mood/Affect WDL X    Affect flat       Mental Health    Little Interest or Pleasure in Doing Things 3-->nearly every day    Feeling Down, Depressed or Hopeless 3-->nearly every day       Speech WDL    Speech WDL WDL       Perceptual State WDL    Perceptual State WDL WDL       Intellectual Performance WDL    Intellectual Performance WDL WDL    Level of Consciousness Alert       Stress    Do you feel stress - tense, restless, nervous, or anxious, or unable to sleep at night because your " mind is troubled all the time - these days? To some exte       Coping/Stress    Major Change/Loss/Stressor death of a loved one;loss of independence    Patient Personal Strengths future/goal oriented;kenn/spirituality    Sources of Support adult child(katarzyna);other family members    Techniques to [place] with Loss/Stress/Change none    Reaction to Health Status adjusting    Understanding of Condition and Treatment adequate understanding of medical condition;adequate understanding of treatment       Developmental Stage (Eriksson's)    Developmental Stage Stage 8 (65 years-death/Late Adulthood) Integrity vs. Despair       C-SSRS (Recent)    Q1 Wished to be Dead (Past Month) no    Q2 Suicidal Thoughts (Past Month) no    Q6 Suicide Behavior (Lifetime) no       Violence Risk    Feels Like Hurting Others no    Previous Attempt to Harm Others no                   Abuse/Neglect       Row Name 06/11/24 1548       Personal Safety    Feels Unsafe at Home or Work/School no    Feels Threatened by Someone no    Does Anyone Try to Keep You From Having Contact with Others or Doing Things Outside Your Home? no    Physical Signs of Abuse Present no                   Legal       Row Name 06/11/24 1548       Financial Resource Strain    How hard is it for you to pay for the very basics like food, housing, medical care, and heating? Not very       Financial/Legal    Source of Income social security       Legal    Criminal Activity/Legal Involvement none                   Substance Abuse       Row Name 06/11/24 1549       Substance Use    Substance Use Status never used       AUDIT-C (Alcohol Use Disorders ID Test)    Q1: How often do you have a drink containing alcohol? Never    Q2: How many drinks containing alcohol do you have on a typical day when you are drinking? None    Q3: How often do you have six or more drinks on one occasion? Never    Audit-C Score 0       Family Member Substance Use (#4)    Previous Substance Use Treatment none            OSBALDO Chakraborty, MSW    Phone: [phone]  Fax: [phone]  Email: [email or web address]

## 2024-06-11 NOTE — PROGRESS NOTES
Infectious Diseases Progress Note      LOS: 5 days   Patient Care Team:  Jonathan Luna APRN as PCP - General (Family Medicine)  Jak Gavin MD as Cardiologist (Cardiology)    Chief Complaint: Diarrhea    Subjective     The had no fever during the last 24 hours.  Remained hemodynamically stable requiring no vasopressors.  Currently on 3 L of oxygen by nasal cannula.  The patient is having less stool output from the FMS    Review of Systems:   Review of Systems   Constitutional: Negative.    HENT: Negative.     Eyes: Negative.    Respiratory: Negative.     Cardiovascular: Negative.    Gastrointestinal:  Positive for diarrhea.   Genitourinary: Negative.    Musculoskeletal: Negative.    Skin: Negative.    Neurological: Negative.    Hematological: Negative.    Psychiatric/Behavioral: Negative.          Objective     Vital Signs  Temp:  [97.5 °F (36.4 °C)-97.9 °F (36.6 °C)] 97.5 °F (36.4 °C)  Heart Rate:  [74-85] 74  Resp:  [21-22] 22  BP: (122-153)/(53-68) 127/53    Physical Exam:  Physical Exam  Vitals and nursing note reviewed.   Constitutional:       Appearance: She is well-developed. She is ill-appearing.   HENT:      Head: Normocephalic and atraumatic.   Eyes:      Pupils: Pupils are equal, round, and reactive to light.   Cardiovascular:      Rate and Rhythm: Normal rate and regular rhythm.      Heart sounds: Normal heart sounds.   Pulmonary:      Effort: Pulmonary effort is normal. No respiratory distress.      Breath sounds: Normal breath sounds. No wheezing or rales.   Abdominal:      General: Bowel sounds are normal. There is no distension.      Palpations: Abdomen is soft. There is no mass.      Tenderness: There is abdominal tenderness. There is no guarding or rebound.   Musculoskeletal:         General: No deformity. Normal range of motion.      Cervical back: Normal range of motion and neck supple.   Skin:     General: Skin is warm.      Findings: No erythema or rash.   Neurological:      Mental Status:  She is alert and oriented to person, place, and time.      Cranial Nerves: No cranial nerve deficit.          Results Review:    I have reviewed all clinical data, test, lab, and imaging results.     Radiology  No Radiology Exams Resulted Within Past 24 Hours    Cardiology    Laboratory    Results from last 7 days   Lab Units 06/11/24  0552 06/10/24  0320 06/09/24  0423 06/08/24  0352 06/07/24  0522 06/06/24  0235 06/05/24  1223   WBC 10*3/mm3 11.85* 11.97* 11.95* 15.73* 14.63* 19.84* 23.12*   HEMOGLOBIN g/dL 10.8* 10.6* 11.2* 10.8* 10.6* 13.5 13.4   HEMATOCRIT % 35.2 35.3 37.2 35.1 34.9 43.6 43.5   PLATELETS 10*3/mm3 207 219 201 205 199 229 271     Results from last 7 days   Lab Units 06/11/24  0552 06/10/24  0320 06/09/24  0423 06/08/24  1354 06/08/24  0352 06/07/24  1506 06/07/24  0522 06/06/24  0235 06/05/24  1223   SODIUM mmol/L 132* 129* 130*  --  132*  --  131* 139 134*   POTASSIUM mmol/L 4.1 4.0 4.4 4.4 3.2* 3.8 2.8* 3.2* 3.1*   CHLORIDE mmol/L 98 94* 95*  --  94*  --  92* 93* 87*   CO2 mmol/L 29.6* 28.9 28.3  --  27.3  --  28.8 29.6* 28.9   BUN mg/dL 27* 37* 45*  --  48*  --  42* 47* 50*   CREATININE mg/dL 0.78 0.97 1.23*  --  1.18*  --  1.04* 1.16* 1.45*   GLUCOSE mg/dL 98 106* 120*  --  121*  --  203* 201* 320*   ALBUMIN g/dL 3.1*  --  3.2*  --  3.2*  --   --   --  4.4   BILIRUBIN mg/dL 0.4  --  0.4  --  0.5  --   --   --  1.4*   ALK PHOS U/L 72  --  76  --  77  --   --   --  94   AST (SGOT) U/L 15  --  11  --  13  --   --   --  31   ALT (SGPT) U/L 8  --  7  --  8  --   --   --  17   CALCIUM mg/dL 9.8 9.4 9.5  --  9.3  --  9.4 10.2 10.3                 Microbiology   Microbiology Results (last 10 days)       Procedure Component Value - Date/Time    Clostridioides difficile Toxin - Stool, Per Rectum [[id number]]  (Abnormal) Collected: 06/07/24 2642    Lab Status: Final result Specimen: Stool from Per Rectum Updated: 06/07/24 0701    Narrative:      The following orders were created for panel order  Clostridioides difficile Toxin - Stool, Per Rectum.  Procedure                               Abnormality         Status                     ---------                               -----------         ------                     Clostridioides difficile...[[id number]]  Abnormal            Final result                 Please view results for these tests on the individual orders.    Clostridioides difficile EIA - Stool, Per Rectum [[id number]]  (Abnormal) Collected: 06/07/24 0438    Lab Status: Final result Specimen: Stool from Per Rectum Updated: 06/07/24 0701     C Diff GDH Ag Positive     C.diff Toxin Ag Positive    Narrative:      DNA from a toxigenic strain of C.difficile was detected, along with the presence of free toxin. These results are suggestive of C.difficile infection, in context of an appropriate clinical scenario.    CANDIDA AURIS PCR - Swab, Axilla Right, Axilla Left and Groin [[id number]]  (Normal) Collected: 06/06/24 0848    Lab Status: Final result Specimen: Swab from Axilla Right, Axilla Left and Groin Updated: 06/07/24 0948     CANDIDA AURIS PCR Not Detected    Gastrointestinal Panel, PCR - Stool, Per Rectum [[id number]]  (Normal) Collected: 06/05/24 1818    Lab Status: Final result Specimen: Stool from Per Rectum Updated: 06/05/24 2034     Campylobacter Not Detected     Plesiomonas shigelloides Not Detected     Salmonella Not Detected     Vibrio Not Detected     Vibrio cholerae Not Detected     Yersinia enterocolitica Not Detected     Enteroaggregative E. coli (EAEC) Not Detected     Enteropathogenic E. coli (EPEC) Not Detected     Enterotoxigenic E. coli (ETEC) lt/st Not Detected     Shiga-like toxin-producing E. coli (STEC) stx1/stx2 Not Detected     Shigella/Enteroinvasive E. coli (EIEC) Not Detected     Cryptosporidium Not Detected     Cyclospora cayetanensis Not Detected     Entamoeba histolytica Not Detected     Giardia lamblia Not Detected     Adenovirus F40/41 Not Detected     Astrovirus Not  Detected     Norovirus GI/GII Not Detected     Rotavirus A Not Detected     Sapovirus (I, II, IV or V) Not Detected    Narrative:      If Aeromonas, Staphylococcus aureus or Bacillus cereus are suspected, please order [id number]: Stool Culture, Aeromonas, S aureus, B Cereus.    Respiratory Panel PCR w/COVID-19(SARS-CoV-2) HODAN/MARIA M/TWILA/PAD/COR/JENISE In-House, NP Swab in UTM/VTM, 2 HR TAT - Swab, Nasopharynx [[id number]]  (Normal) Collected: 06/05/24 1224    Lab Status: Final result Specimen: Swab from Nasopharynx Updated: 06/05/24 1326     ADENOVIRUS, PCR Not Detected     Coronavirus 229E Not Detected     Coronavirus HKU1 Not Detected     Coronavirus NL63 Not Detected     Coronavirus OC43 Not Detected     COVID19 Not Detected     Human Metapneumovirus Not Detected     Human Rhinovirus/Enterovirus Not Detected     Influenza A PCR Not Detected     Influenza B PCR Not Detected     Parainfluenza Virus 1 Not Detected     Parainfluenza Virus 2 Not Detected     Parainfluenza Virus 3 Not Detected     Parainfluenza Virus 4 Not Detected     RSV, PCR Not Detected     Bordetella pertussis pcr Not Detected     Bordetella parapertussis PCR Not Detected     Chlamydophila pneumoniae PCR Not Detected     Mycoplasma pneumo by PCR Not Detected    Narrative:      In the setting of a positive respiratory panel with a viral infection PLUS a negative procalcitonin without other underlying concern for bacterial infection, consider observing off antibiotics or discontinuation of antibiotics and continue supportive care. If the respiratory panel is positive for atypical bacterial infection (Bordetella pertussis, Chlamydophila pneumoniae, or Mycoplasma pneumoniae), consider antibiotic de-escalation to target atypical bacterial infection.    Urine Culture - Urine, Straight Cath [[id number]]  (Abnormal)  (Susceptibility) Collected: 06/05/24 1224    Lab Status: Final result Specimen: Urine from Straight Cath Updated: 06/07/24 1039     Urine Culture  >100,000 CFU/mL Escherichia coli ESBL     Comment:   Consider infectious disease consult.  Susceptibility results may not correlate to clinical outcomes.       Narrative:      Colonization of the urinary tract without infection is common. Treatment is discouraged unless the patient is symptomatic, pregnant, or undergoing an invasive urologic procedure.  Recent outcomes data supports the use of pip/tazo in the treatment of susceptible ESBL infections for uncomplicated UTI. Consider use of pip/tazo as a carbapenem-sparing regimen in applicable patients.    Susceptibility        Escherichia coli ESBL      ROBERT      Ertapenem Susceptible      Gentamicin Susceptible      Levofloxacin Resistant      Meropenem Susceptible      Nitrofurantoin Susceptible      Piperacillin + Tazobactam Susceptible      Trimethoprim + Sulfamethoxazole Resistant                           Blood Culture - Blood, Arm, Left [[id number]]  (Normal) Collected: 06/05/24 1223    Lab Status: Final result Specimen: Blood from Arm, Left Updated: 06/10/24 1246     Blood Culture No growth at 5 days    Blood Culture - Blood, Arm, Left [[id number]]  (Abnormal)  (Susceptibility) Collected: 06/05/24 1223    Lab Status: Final result Specimen: Blood from Arm, Left Updated: 06/09/24 0729     Blood Culture Escherichia coli ESBL     Comment:   Consider infectious disease consult.  Susceptibility results may not correlate to clinical outcomes.  For ESBL-producing infections in the blood, a carbapenem is recommended as first-line therapy for optimal clinical outcomes.        Isolated from Aerobic Bottle     Gram Stain Aerobic Bottle Gram negative bacilli    Narrative:      Less than seven (7) mL's of blood was collected.  Insufficient quantity may yield false negative results.    Susceptibility        Escherichia coli ESBL      ROBERT      Ertapenem Susceptible      Levofloxacin Resistant      Meropenem Susceptible      Trimethoprim + Sulfamethoxazole Susceptible                        Susceptibility Comments       Escherichia coli ESBL    Cefazolin sensitivity will not be reported for Enterobacteriaceae in non-urine isolates. If cefazolin is preferred, please call the microbiology lab to request an E-test.  With the exception of urinary-sourced infections, aminoglycosides should not be used as monotherapy.               Blood Culture ID, PCR - Blood, Arm, Left [[id number]]  (Abnormal) Collected: 06/05/24 1223    Lab Status: Final result Specimen: Blood from Arm, Left Updated: 06/07/24 0608     BCID, PCR Escherichia coli. Identification by BCID2 PCR.     BCID, PCR 2 CTX-M (ESBL) detected. Identification by BCID2 PCR     BOTTLE TYPE Aerobic Bottle            Medication Review:       Schedule Meds  apixaban, 5 mg, Oral, Q12H  cholestyramine light, 1 packet, Oral, Q12H  diazePAM, 10 mg, Oral, Nightly  dilTIAZem, 30 mg, Oral, Q8H  DULoxetine, 40 mg, Oral, Daily  famotidine, 20 mg, Oral, Daily  ferrous sulfate, 324 mg, Oral, Daily With Breakfast  levothyroxine, 50 mcg, Oral, Q AM  meropenem, 1,000 mg, Intravenous, Q12H  metoprolol succinate XL, 50 mg, Oral, Daily  predniSONE, 5 mg, Oral, Daily  saccharomyces boulardii, 250 mg, Oral, BID  sodium chloride, 10 mL, Intravenous, Q12H  tacrolimus, 0.5 mg, Oral, BID  vancomycin, 250 mg, Oral, Q6H        Infusion Meds  Pharmacy Consult,         PRN Meds    acetaminophen    Calcium Replacement - Follow Nurse / BPA Driven Protocol    carboxymethylcellulose sod    diazePAM    diphenhydrAMINE    loperamide    Magnesium Standard Dose Replacement - Follow Nurse / BPA Driven Protocol    melatonin    ondansetron    Pharmacy Consult    Phosphorus Replacement - Follow Nurse / BPA Driven Protocol    Potassium Replacement - Follow Nurse / BPA Driven Protocol    [COMPLETED] Insert Peripheral IV **AND** sodium chloride    sodium chloride    sodium chloride        Assessment & Plan       Antimicrobial Therapy   1.  IV meropenem        2.  P.o.  vancomycin        3.        4.        5.          Assessment     ESBL  E. coli bacteremia secondary to complicated UTI.  CT scan of abdomen pelvis showed no acute findings.  T     Complicated UTI with bacteremia.  Urine culture grew ESBL  E. coli.  Patient had recent episode of E. coli/ESBL  UTI and treated with p.o. fosfomycin     C. difficile colitis.  According to patient this is her second episode and she had 1 episode in 2013.  Currently has a fecal management system diarrhea is slowing down     Reactive leukocytosis secondary to above and steroids.  White count remained stable.  Trending down     History of COPD chronically on 4 L of oxygen via nasal cannula.  Currently on 3 L     History of kidney transplant in 2017 secondary to granulomatosis polyangiitis.  Currently on prednisone and Prograf     History of lung cancer in the past     Plan     Continue IV meropenem 1 g every 12 hours for total of 14 days-last day on June 20, 2024  Continue p.o. vancomycin  250 mg every 6 hours.  We will consider 3 weeks of treatment with p.o. vancomycin.  Continue probiotics  Continue supportive care  A.m. labs  Enteric isolation for C. difficile colitis  Contact isolation for ESBL  E. Coli  Appropriate PPE was placed on before entering the room         Dirk Robin MD  06/11/24  11:50 EDT    Note is dictated utilizing voice recognition software/Dragon

## 2024-06-11 NOTE — PROGRESS NOTES
"RENAL/KCC:     LOS: 5 days    Patient Care Team:  Jonathan Luna APRN as PCP - General (Family Medicine)  Jak Gavin MD as Cardiologist (Cardiology)    Chief Complaint:  Fever    Subjective     Interval History:   Chart reviewed  Denies any CP or SOA  No worsening edema      Objective     Vital Sign Min/Max for last 24 hours  Temp  Min: 97.5 °F (36.4 °C)  Max: 97.9 °F (36.6 °C)   BP  Min: 122/56  Max: 153/68   Pulse  Min: 74  Max: 85   Resp  Min: 21  Max: 22   SpO2  Min: 80 %  Max: 100 %   Flow (L/min)  Min: 3  Max: 3   Weight  Min: 67.5 kg (148 lb 13 oz)  Max: 67.5 kg (148 lb 13 oz)     Flowsheet Rows      Flowsheet Row First Filed Value   Admission Height 167.6 cm (66\") Documented at 06/05/2024 1152   Admission Weight 71.3 kg (157 lb 3 oz) Documented at 06/05/2024 1152            No intake/output data recorded.  I/O last 3 completed shifts:  In: 1411 [P.O.:1110; I.V.:281; Other:20]  Out: 1800 [Urine:1550; Stool:250]    Physical Exam:  GEN: Awake, NAD  ENT: PERRL, EOMI, MMM  NECK: Supple, no JVD  CHEST: CTAB, no W/R/C  CV: RRR, no M/G/R  ABD: Soft, NT, +BS  SKIN: Warm and Dry  NEURO: CN's intact      WBC WBC   Date Value Ref Range Status   06/11/2024 11.85 (H) 3.40 - 10.80 10*3/mm3 Final   06/10/2024 11.97 (H) 3.40 - 10.80 10*3/mm3 Final   06/09/2024 11.95 (H) 3.40 - 10.80 10*3/mm3 Final      HGB Hemoglobin   Date Value Ref Range Status   06/11/2024 10.8 (L) 12.0 - 15.9 g/dL Final   06/10/2024 10.6 (L) 12.0 - 15.9 g/dL Final   06/09/2024 11.2 (L) 12.0 - 15.9 g/dL Final      HCT Hematocrit   Date Value Ref Range Status   06/11/2024 35.2 34.0 - 46.6 % Final   06/10/2024 35.3 34.0 - 46.6 % Final   06/09/2024 37.2 34.0 - 46.6 % Final      Platlets No results found for: \"LABPLAT\"   MCV MCV   Date Value Ref Range Status   06/11/2024 93.1 79.0 - 97.0 fL Final   06/10/2024 94.6 79.0 - 97.0 fL Final   06/09/2024 94.4 79.0 - 97.0 fL Final          Sodium Sodium   Date Value Ref Range Status   06/11/2024 132 (L) 136 - " "145 mmol/L Final   06/10/2024 129 (L) 136 - 145 mmol/L Final   06/09/2024 130 (L) 136 - 145 mmol/L Final      Potassium Potassium   Date Value Ref Range Status   06/11/2024 4.1 3.5 - 5.2 mmol/L Final   06/10/2024 4.0 3.5 - 5.2 mmol/L Final   06/09/2024 4.4 3.5 - 5.2 mmol/L Final   06/08/2024 4.4 3.5 - 5.2 mmol/L Final     Comment:     Specimen hemolyzed.  Result may be falsely elevated.  Result checked      Chloride Chloride   Date Value Ref Range Status   06/11/2024 98 98 - 107 mmol/L Final   06/10/2024 94 (L) 98 - 107 mmol/L Final   06/09/2024 95 (L) 98 - 107 mmol/L Final      CO2 CO2   Date Value Ref Range Status   06/11/2024 29.6 (H) 22.0 - 29.0 mmol/L Final   06/10/2024 28.9 22.0 - 29.0 mmol/L Final   06/09/2024 28.3 22.0 - 29.0 mmol/L Final      BUN BUN   Date Value Ref Range Status   06/11/2024 27 (H) 8 - 23 mg/dL Final   06/10/2024 37 (H) 8 - 23 mg/dL Final   06/09/2024 45 (H) 8 - 23 mg/dL Final      Creatinine Creatinine   Date Value Ref Range Status   06/11/2024 0.78 0.57 - 1.00 mg/dL Final   06/10/2024 0.97 0.57 - 1.00 mg/dL Final   06/09/2024 1.23 (H) 0.57 - 1.00 mg/dL Final      Calcium Calcium   Date Value Ref Range Status   06/11/2024 9.8 8.6 - 10.5 mg/dL Final   06/10/2024 9.4 8.6 - 10.5 mg/dL Final   06/09/2024 9.5 8.6 - 10.5 mg/dL Final      PO4 No results found for: \"CAPO4\"   Albumin Albumin   Date Value Ref Range Status   06/11/2024 3.1 (L) 3.5 - 5.2 g/dL Final   06/09/2024 3.2 (L) 3.5 - 5.2 g/dL Final      Magnesium No results found for: \"MG\"     Uric Acid No results found for: \"URICACID\"        Results Review:     I reviewed the patient's new clinical results.    apixaban, 5 mg, Oral, Q12H  cholestyramine light, 1 packet, Oral, Q12H  diazePAM, 10 mg, Oral, Nightly  dilTIAZem, 30 mg, Oral, Q8H  DULoxetine, 40 mg, Oral, Daily  famotidine, 20 mg, Oral, Daily  ferrous sulfate, 324 mg, Oral, Daily With Breakfast  levothyroxine, 50 mcg, Oral, Q AM  meropenem, 1,000 mg, Intravenous, Q12H  metoprolol " succinate XL, 50 mg, Oral, Daily  predniSONE, 5 mg, Oral, Daily  saccharomyces boulardii, 250 mg, Oral, BID  sodium chloride, 10 mL, Intravenous, Q12H  tacrolimus, 1 mg, Oral, BID  vancomycin, 250 mg, Oral, Q6H      Pharmacy Consult,         Medication Review: Reviewed    Assessment & Plan     1) TONYA  2) DDKT  3) Chronic ImmunoRx  4) UTI - E. Coli/ESBL  5) ESBL Bacteremia  6) C. Diff  7) Lactic acidosis  8) Tachycardia  9) Hypokalemia  10) CHF  11) Hyponatremia     PLAN: Cr improved to 0.7 = baseline.  Holding diuretics for now and patient euvolemic today.  ABX for UTI, bacteremia and C. Diff per primary/ID.  Jardiance D/C'd indefinitely.  Fluid restrict given lower Na (improved).  Recent FK levels have been above goal of 3-5.  Decrease Prograf to 0.5 mg BID.  Will follow.      Naun Falcon MD  [facility]  06/11/24  11:30 EDT

## 2024-06-11 NOTE — PLAN OF CARE
"Assessment: Jaja Carcamo presents with functional mobility impairments which indicate the need for skilled intervention. Tolerating session today without incident. Pt with impaired global strength, balance, and activity tolerance. Pt not safe to return home, as she is at a high risk for falls and/or immobility complications. Pt will require SNF placement at discharge. Will continue to follow and progress as tolerated.     Plan/Recommendations:   If medically appropriate, Moderate Intensity Therapy recommended post-acute care. This is recommended as therapy feels the patient would require 3-4 days per week and wouldn't tolerate \"3 hour daily\" rehab intensity. SNF would be the preferred choice. If the patient does not agree to SNF, arrange HH or OP depending on home bound status. If patient is medically complex, consider LTACH. Pt requires no DME at discharge.     Pt desires Skilled Rehab placement at discharge. Pt cooperative; agreeable to therapeutic recommendations and plan of care.     "

## 2024-06-11 NOTE — PROGRESS NOTES
LOS: 5 days   Patient Care Team:  Jonathan Luna APRN as PCP - General (Family Medicine)  Jak Gavin MD as Cardiologist (Cardiology)    Subjective     Interval History: Stable overnight but not sleeping    Patient Complaints: Not sleeping at night, requests sleeping pill although she is already taking Valium and melatonin at night.  Now having gelatinous/semiformed stool    History taken from: patient    Review of Systems   Constitutional:  Positive for activity change, appetite change and fatigue. Negative for diaphoresis and fever.   HENT:  Negative for facial swelling.    Eyes:  Negative for visual disturbance.   Respiratory:  Negative for cough, shortness of breath, wheezing and stridor.    Cardiovascular:  Negative for chest pain, palpitations and leg swelling.   Gastrointestinal:  Positive for diarrhea. Negative for abdominal pain, nausea and vomiting.   Endocrine: Negative for polyuria.   Genitourinary:  Negative for frequency.   Musculoskeletal:  Positive for arthralgias and back pain.   Neurological:  Positive for weakness. Negative for light-headedness.   Psychiatric/Behavioral:  Negative for confusion.            Objective     Vital Signs  Temp:  [97.4 °F (36.3 °C)-97.9 °F (36.6 °C)] 97.5 °F (36.4 °C)  Heart Rate:  [72-89] 87  Resp:  [17-24] 24  BP: (123-153)/(53-68) 135/68    Physical Exam:     General Appearance:    Alert, cooperative, in no acute distress, chronically ill-appearing   Head:    Normocephalic, without obvious abnormality, atraumatic   Eyes:            Lids and lashes normal, conjunctivae and sclerae normal, no   icterus, no pallor, corneas clear, PERRLA   Ears:    Ears appear intact with no abnormalities noted   Throat:   No oral lesions, no thrush, oral mucosa moist   Neck:   No adenopathy, supple, trachea midline, no thyromegaly, no   carotid bruit, no JVD   Lungs:     Clear to auscultation,respirations regular, even and                  unlabored    Heart:    Regular rhythm  and normal rate, normal S1 and S2, no            murmur, no gallop, no rub, no click   Chest Wall:    No abnormalities observed   Abdomen:     Normal bowel sounds, no masses, no organomegaly, soft        Non-tender non-distended, no guarding,   Extremities:   Moves all extremities well, no edema, no cyanosis, no             Redness   Pulses:   Pulses palpable and equal bilaterally   Skin: Extensive superficial bruising from blood draws   Lymph nodes:   No palpable adenopathy   Neurologic:   Cranial nerves 2 - 12 grossly intact, sensation intact, DTR       present and equal bilaterally        Results Review:    Lab Results (last 24 hours)       Procedure Component Value Units Date/Time    Comprehensive Metabolic Panel [[id number]]  (Abnormal) Collected: 06/11/24 0552    Specimen: Blood Updated: 06/11/24 0627     Glucose 98 mg/dL      BUN 27 mg/dL      Creatinine 0.78 mg/dL      Sodium 132 mmol/L      Potassium 4.1 mmol/L      Chloride 98 mmol/L      CO2 29.6 mmol/L      Calcium 9.8 mg/dL      Total Protein 5.7 g/dL      Albumin 3.1 g/dL      ALT (SGPT) 8 U/L      AST (SGOT) 15 U/L      Alkaline Phosphatase 72 U/L      Total Bilirubin 0.4 mg/dL      Globulin 2.6 gm/dL      A/G Ratio 1.2 g/dL      BUN/Creatinine Ratio 34.6     Anion Gap 4.4 mmol/L      eGFR 78.3 mL/min/1.73     Narrative:      GFR Normal >60  Chronic Kidney Disease <60  Kidney Failure <15    The GFR formula is only valid for adults with stable renal function between ages 18 and 70.    CBC & Differential [[id number]]  (Abnormal) Collected: 06/11/24 0552    Specimen: Blood Updated: 06/11/24 0601    Narrative:      The following orders were created for panel order CBC & Differential.  Procedure                               Abnormality         Status                     ---------                               -----------         ------                     CBC Auto Differential[[id number]]        Abnormal            Final result                 Please view  results for these tests on the individual orders.    CBC Auto Differential [[id number]]  (Abnormal) Collected: 06/11/24 0552    Specimen: Blood Updated: 06/11/24 0601     WBC 11.85 10*3/mm3      RBC 3.78 10*6/mm3      Hemoglobin 10.8 g/dL      Hematocrit 35.2 %      MCV 93.1 fL      MCH 28.6 pg      MCHC 30.7 g/dL      RDW 15.9 %      RDW-SD 54.2 fl      MPV 9.3 fL      Platelets 207 10*3/mm3      Neutrophil % 77.9 %      Lymphocyte % 7.5 %      Monocyte % 7.8 %      Eosinophil % 2.8 %      Basophil % 0.4 %      Immature Grans % 3.6 %      Neutrophils, Absolute 9.23 10*3/mm3      Lymphocytes, Absolute 0.89 10*3/mm3      Monocytes, Absolute 0.92 10*3/mm3      Eosinophils, Absolute 0.33 10*3/mm3      Basophils, Absolute 0.05 10*3/mm3      Immature Grans, Absolute 0.43 10*3/mm3      nRBC 0.0 /100 WBC     Tacrolimus Level [[id number]] Collected: 06/07/24 0522    Specimen: Blood Updated: 06/11/24 0111     Tacrolimus 10.9 ng/mL      Comment:         Trough (immediately following                  transplant)                       15.0          Trough (steady state, 2 weeks or                  more after transplant):      3.0 - 8.0          Performed by LC-MS/MS technology.       Narrative:      Test(s) 700964-Tacrolimus (), Blood  was developed and its performance characteristics determined  by [facility]. It has not been cleared or approved by the Food  and Drug Administration.  Performed at:  [address]  [id number]  : Star Flores MD, Phone:  [phone]             Imaging Results (Last 24 Hours)       ** No results found for the last 24 hours. **                 I reviewed the patient's new clinical results.    Medication Review:   Scheduled Meds:apixaban, 5 mg, Oral, Q12H  cholestyramine light, 1 packet, Oral, Q12H  diazePAM, 10 mg, Oral, Nightly  dilTIAZem, 30 mg, Oral, Q8H  DULoxetine, 40 mg, Oral, Daily  famotidine, 20 mg, Oral, Daily  ferrous sulfate, 324  mg, Oral, Daily With Breakfast  levothyroxine, 50 mcg, Oral, Q AM  meropenem, 1,000 mg, Intravenous, Q12H  metoprolol succinate XL, 50 mg, Oral, Daily  predniSONE, 5 mg, Oral, Daily  saccharomyces boulardii, 250 mg, Oral, BID  sodium chloride, 10 mL, Intravenous, Q12H  tacrolimus, 0.5 mg, Oral, BID  vancomycin, 250 mg, Oral, Q6H      Continuous Infusions:Pharmacy Consult,       PRN Meds:.  acetaminophen    Calcium Replacement - Follow Nurse / BPA Driven Protocol    carboxymethylcellulose sod    diazePAM    diphenhydrAMINE    loperamide    Magnesium Standard Dose Replacement - Follow Nurse / BPA Driven Protocol    melatonin    ondansetron    Pharmacy Consult    Phosphorus Replacement - Follow Nurse / BPA Driven Protocol    Potassium Replacement - Follow Nurse / BPA Driven Protocol    [COMPLETED] Insert Peripheral IV **AND** sodium chloride    sodium chloride    sodium chloride     Assessment & Plan       UTI (urinary tract infection)    C. difficile colitis  -Stool is now more formed; adding cholestyramine and removing fecal management system; continue Florastor and oral vancomycin  Renal transplant-lowering dose of tacrolimus per nephrology; continue prednisone  E. coli bacteremia-continue meropenem  Immunocompromised secondary to medications  Leukocytosis-resolving  Permanent atrial fib-continue diltiazem, anticoagulation  Chronic anemia-oral iron  Mood disorder-duloxetine  Hypothyroidism-levothyroxine  Insomnia-increased dose of diazepam at night.    Will need PICC line prior to discharge.  Hopefully can transfer back to SNF soon, possibly tomorrow    Plan for disposition: Return to SNF    Juanis Lowe MD  06/11/24  19:51 EDT

## 2024-06-12 LAB
ALBUMIN SERPL-MCNC: 3 G/DL (ref 3.5–5.2)
ALBUMIN/GLOB SERPL: 1.4 G/DL
ALP SERPL-CCNC: 67 U/L (ref 39–117)
ALT SERPL W P-5'-P-CCNC: 8 U/L (ref 1–33)
ANION GAP SERPL CALCULATED.3IONS-SCNC: 5.1 MMOL/L (ref 5–15)
AST SERPL-CCNC: 13 U/L (ref 1–32)
BASOPHILS # BLD AUTO: 0.03 10*3/MM3 (ref 0–0.2)
BASOPHILS NFR BLD AUTO: 0.3 % (ref 0–1.5)
BILIRUB SERPL-MCNC: 0.3 MG/DL (ref 0–1.2)
BUN SERPL-MCNC: 21 MG/DL (ref 8–23)
BUN/CREAT SERPL: 32.3 (ref 7–25)
CALCIUM SPEC-SCNC: 9.1 MG/DL (ref 8.6–10.5)
CHLORIDE SERPL-SCNC: 103 MMOL/L (ref 98–107)
CO2 SERPL-SCNC: 29.9 MMOL/L (ref 22–29)
CREAT SERPL-MCNC: 0.65 MG/DL (ref 0.57–1)
DEPRECATED RDW RBC AUTO: 56.7 FL (ref 37–54)
EGFRCR SERPLBLD CKD-EPI 2021: 90.8 ML/MIN/1.73
EOSINOPHIL # BLD AUTO: 0.28 10*3/MM3 (ref 0–0.4)
EOSINOPHIL NFR BLD AUTO: 2.5 % (ref 0.3–6.2)
ERYTHROCYTE [DISTWIDTH] IN BLOOD BY AUTOMATED COUNT: 15.9 % (ref 12.3–15.4)
GLOBULIN UR ELPH-MCNC: 2.2 GM/DL
GLUCOSE SERPL-MCNC: 102 MG/DL (ref 65–99)
HCT VFR BLD AUTO: 34.7 % (ref 34–46.6)
HGB BLD-MCNC: 10.3 G/DL (ref 12–15.9)
IMM GRANULOCYTES # BLD AUTO: 0.39 10*3/MM3 (ref 0–0.05)
IMM GRANULOCYTES NFR BLD AUTO: 3.5 % (ref 0–0.5)
LYMPHOCYTES # BLD AUTO: 0.7 10*3/MM3 (ref 0.7–3.1)
LYMPHOCYTES NFR BLD AUTO: 6.2 % (ref 19.6–45.3)
MCH RBC QN AUTO: 28.1 PG (ref 26.6–33)
MCHC RBC AUTO-ENTMCNC: 29.7 G/DL (ref 31.5–35.7)
MCV RBC AUTO: 94.6 FL (ref 79–97)
MONOCYTES # BLD AUTO: 1.17 10*3/MM3 (ref 0.1–0.9)
MONOCYTES NFR BLD AUTO: 10.4 % (ref 5–12)
NEUTROPHILS NFR BLD AUTO: 77.1 % (ref 42.7–76)
NEUTROPHILS NFR BLD AUTO: 8.71 10*3/MM3 (ref 1.7–7)
NRBC BLD AUTO-RTO: 0 /100 WBC (ref 0–0.2)
PLATELET # BLD AUTO: 213 10*3/MM3 (ref 140–450)
PMV BLD AUTO: 9.4 FL (ref 6–12)
POTASSIUM SERPL-SCNC: 4.1 MMOL/L (ref 3.5–5.2)
PROT SERPL-MCNC: 5.2 G/DL (ref 6–8.5)
RBC # BLD AUTO: 3.67 10*6/MM3 (ref 3.77–5.28)
SODIUM SERPL-SCNC: 138 MMOL/L (ref 136–145)
TACROLIMUS BLD LC/MS/MS-MCNC: 18.4 NG/ML (ref 2–20)
WBC NRBC COR # BLD AUTO: 11.28 10*3/MM3 (ref 3.4–10.8)

## 2024-06-12 PROCEDURE — 63710000001 TACROLIMUS PER 1 MG: Performed by: INTERNAL MEDICINE

## 2024-06-12 PROCEDURE — 85025 COMPLETE CBC W/AUTO DIFF WBC: CPT | Performed by: INTERNAL MEDICINE

## 2024-06-12 PROCEDURE — 25010000002 MEROPENEM PER 100 MG: Performed by: INTERNAL MEDICINE

## 2024-06-12 PROCEDURE — 63710000001 PREDNISONE PER 5 MG

## 2024-06-12 PROCEDURE — 80053 COMPREHEN METABOLIC PANEL: CPT | Performed by: INTERNAL MEDICINE

## 2024-06-12 PROCEDURE — 25010000002 MEROPENEM PER 100 MG: Performed by: NURSE PRACTITIONER

## 2024-06-12 PROCEDURE — 97110 THERAPEUTIC EXERCISES: CPT

## 2024-06-12 RX ORDER — HYDROCODONE BITARTRATE AND ACETAMINOPHEN 5; 325 MG/1; MG/1
1 TABLET ORAL EVERY 6 HOURS PRN
Status: DISCONTINUED | OUTPATIENT
Start: 2024-06-12 | End: 2024-06-13 | Stop reason: HOSPADM

## 2024-06-12 RX ORDER — FUROSEMIDE 40 MG/1
40 TABLET ORAL DAILY
Status: DISCONTINUED | OUTPATIENT
Start: 2024-06-12 | End: 2024-06-13 | Stop reason: HOSPADM

## 2024-06-12 RX ADMIN — TACROLIMUS 0.5 MG: 0.5 CAPSULE ORAL at 09:44

## 2024-06-12 RX ADMIN — DULOXETINE HYDROCHLORIDE 40 MG: 20 CAPSULE, DELAYED RELEASE ORAL at 09:44

## 2024-06-12 RX ADMIN — Medication 250 MG: at 09:44

## 2024-06-12 RX ADMIN — APIXABAN 5 MG: 5 TABLET, FILM COATED ORAL at 09:44

## 2024-06-12 RX ADMIN — DILTIAZEM HYDROCHLORIDE 30 MG: 30 TABLET, FILM COATED ORAL at 21:14

## 2024-06-12 RX ADMIN — VANCOMYCIN HYDROCHLORIDE 250 MG: 250 CAPSULE ORAL at 23:57

## 2024-06-12 RX ADMIN — DIAZEPAM 5 MG: 5 TABLET ORAL at 13:09

## 2024-06-12 RX ADMIN — APIXABAN 5 MG: 5 TABLET, FILM COATED ORAL at 21:14

## 2024-06-12 RX ADMIN — MEROPENEM 1000 MG: 1 INJECTION, POWDER, FOR SOLUTION INTRAVENOUS at 05:37

## 2024-06-12 RX ADMIN — DILTIAZEM HYDROCHLORIDE 30 MG: 30 TABLET, FILM COATED ORAL at 13:17

## 2024-06-12 RX ADMIN — METOPROLOL SUCCINATE 50 MG: 50 TABLET, EXTENDED RELEASE ORAL at 09:44

## 2024-06-12 RX ADMIN — MEROPENEM 1000 MG: 1 INJECTION, POWDER, FOR SOLUTION INTRAVENOUS at 17:13

## 2024-06-12 RX ADMIN — VANCOMYCIN HYDROCHLORIDE 250 MG: 250 CAPSULE ORAL at 17:13

## 2024-06-12 RX ADMIN — FUROSEMIDE 40 MG: 40 TABLET ORAL at 13:09

## 2024-06-12 RX ADMIN — HYDROCODONE BITARTRATE AND ACETAMINOPHEN 1 TABLET: 5; 325 TABLET ORAL at 23:57

## 2024-06-12 RX ADMIN — FERROUS SULFATE TAB EC 324 MG (65 MG FE EQUIVALENT) 324 MG: 324 (65 FE) TABLET DELAYED RESPONSE at 09:44

## 2024-06-12 RX ADMIN — LEVOTHYROXINE SODIUM 50 MCG: 0.05 TABLET ORAL at 05:37

## 2024-06-12 RX ADMIN — CHOLESTYRAMINE 4 G: 4 POWDER, FOR SUSPENSION ORAL at 21:14

## 2024-06-12 RX ADMIN — FAMOTIDINE 20 MG: 20 TABLET, FILM COATED ORAL at 09:44

## 2024-06-12 RX ADMIN — HYDROCODONE BITARTRATE AND ACETAMINOPHEN 1 TABLET: 5; 325 TABLET ORAL at 13:09

## 2024-06-12 RX ADMIN — Medication 10 ML: at 09:44

## 2024-06-12 RX ADMIN — TACROLIMUS 0.5 MG: 0.5 CAPSULE ORAL at 21:13

## 2024-06-12 RX ADMIN — DILTIAZEM HYDROCHLORIDE 30 MG: 30 TABLET, FILM COATED ORAL at 05:37

## 2024-06-12 RX ADMIN — VANCOMYCIN HYDROCHLORIDE 250 MG: 250 CAPSULE ORAL at 05:37

## 2024-06-12 RX ADMIN — VANCOMYCIN HYDROCHLORIDE 250 MG: 250 CAPSULE ORAL at 00:47

## 2024-06-12 RX ADMIN — DIAZEPAM 10 MG: 5 TABLET ORAL at 21:13

## 2024-06-12 RX ADMIN — VANCOMYCIN HYDROCHLORIDE 250 MG: 250 CAPSULE ORAL at 13:09

## 2024-06-12 RX ADMIN — Medication 10 ML: at 21:14

## 2024-06-12 RX ADMIN — PREDNISONE 5 MG: 5 TABLET ORAL at 09:44

## 2024-06-12 RX ADMIN — Medication 250 MG: at 21:14

## 2024-06-12 NOTE — THERAPY TREATMENT NOTE
"Subjective: Pt agreeable to therapeutic plan of care.  Cognition: oriented to Person and Place    Objective:     Bed Mobility: N/A or Not attempted.   Functional Transfers: N/A or Not attempted.    Lower Body Dressing: Max-A  ADL Position: supine      Therapeutic Exercise - 10 Reps B LE AROM lying supine    Vitals: WNL    Pain: 0 VAS  Location: n/a  Interventions for pain: N/A  Education: Provided education on the importance of mobility in the acute care setting      Assessment: Jaja Carcamo presents with ADL impairments affecting function including balance, endurance / activity tolerance, shortness of breath, and strength. Demonstrated functioning below baseline abilities indicate the need for continued skilled intervention while inpatient. Tolerating session today without incident. Will continue to follow and progress as tolerated.     Plan/Recommendations:   Moderate Intensity Therapy recommended post-acute care. This is recommended as therapy feels the patient would require 3-4 days per week and wouldn't tolerate \"3 hour daily\" rehab intensity. SNF would be the preferred choice. If the patient does not agree to SNF, arrange HH or OP depending on home bound status. If patient is medically complex, consider LTACH.. Pt requires no DME at discharge.     Pt desires Skilled Rehab placement at discharge. Pt cooperative; agreeable to therapeutic recommendations and plan of care.     Modified Flores: N/A = No pre-op stroke/TIA    Post-Tx Position: Supine with HOB Elevated  PPE: gloves    "

## 2024-06-12 NOTE — PROGRESS NOTES
Infectious Diseases Progress Note      LOS: 6 days   Patient Care Team:  Jonathan Luna APRN as PCP - General (Family Medicine)  Jak Gavin MD as Cardiologist (Cardiology)    Chief Complaint: Diarrhea    Subjective     The had no fever during the last 24 hours.  Remained hemodynamically stable requiring no vasopressors.  Currently on 3 L of oxygen by nasal cannula.  The patient started to have partially formed stool.  Fecal management system was removed    Review of Systems:   Review of Systems   Constitutional: Negative.    HENT: Negative.     Eyes: Negative.    Respiratory: Negative.     Cardiovascular: Negative.    Gastrointestinal:  Positive for diarrhea.   Genitourinary: Negative.    Musculoskeletal: Negative.    Skin: Negative.    Neurological: Negative.    Hematological: Negative.    Psychiatric/Behavioral: Negative.          Objective     Vital Signs  Temp:  [97.4 °F (36.3 °C)-98.4 °F (36.9 °C)] 98.4 °F (36.9 °C)  Heart Rate:  [] 67  Resp:  [17-24] 17  BP: (123-150)/(63-72) 150/72    Physical Exam:  Physical Exam  Vitals and nursing note reviewed.   Constitutional:       Appearance: She is well-developed. She is ill-appearing.   HENT:      Head: Normocephalic and atraumatic.   Eyes:      Pupils: Pupils are equal, round, and reactive to light.   Cardiovascular:      Rate and Rhythm: Normal rate and regular rhythm.      Heart sounds: Normal heart sounds.   Pulmonary:      Effort: Pulmonary effort is normal. No respiratory distress.      Breath sounds: Normal breath sounds. No wheezing or rales.   Abdominal:      General: Bowel sounds are normal. There is no distension.      Palpations: Abdomen is soft. There is no mass.      Tenderness: There is abdominal tenderness. There is no guarding or rebound.   Musculoskeletal:         General: No deformity. Normal range of motion.      Cervical back: Normal range of motion and neck supple.   Skin:     General: Skin is warm.      Findings: No erythema or  rash.   Neurological:      Mental Status: She is alert and oriented to person, place, and time.      Cranial Nerves: No cranial nerve deficit.          Results Review:    I have reviewed all clinical data, test, lab, and imaging results.     Radiology  No Radiology Exams Resulted Within Past 24 Hours    Cardiology    Laboratory    Results from last 7 days   Lab Units 06/12/24  0556 06/11/24  0552 06/10/24  0320 06/09/24  0423 06/08/24  0352 06/07/24  0522 06/06/24  0235   WBC 10*3/mm3 11.28* 11.85* 11.97* 11.95* 15.73* 14.63* 19.84*   HEMOGLOBIN g/dL 10.3* 10.8* 10.6* 11.2* 10.8* 10.6* 13.5   HEMATOCRIT % 34.7 35.2 35.3 37.2 35.1 34.9 43.6   PLATELETS 10*3/mm3 213 207 219 201 205 199 229     Results from last 7 days   Lab Units 06/12/24  0556 06/11/24  0552 06/10/24  0320 06/09/24  0423 06/08/24  1354 06/08/24  0352 06/07/24  1506 06/07/24  0522 06/06/24  0235 06/05/24  1223   SODIUM mmol/L 138 132* 129* 130*  --  132*  --  131* 139 134*   POTASSIUM mmol/L 4.1 4.1 4.0 4.4 4.4 3.2* 3.8 2.8* 3.2* 3.1*   CHLORIDE mmol/L 103 98 94* 95*  --  94*  --  92* 93* 87*   CO2 mmol/L 29.9* 29.6* 28.9 28.3  --  27.3  --  28.8 29.6* 28.9   BUN mg/dL 21 27* 37* 45*  --  48*  --  42* 47* 50*   CREATININE mg/dL 0.65 0.78 0.97 1.23*  --  1.18*  --  1.04* 1.16* 1.45*   GLUCOSE mg/dL 102* 98 106* 120*  --  121*  --  203* 201* 320*   ALBUMIN g/dL 3.0* 3.1*  --  3.2*  --  3.2*  --   --   --  4.4   BILIRUBIN mg/dL 0.3 0.4  --  0.4  --  0.5  --   --   --  1.4*   ALK PHOS U/L 67 72  --  76  --  77  --   --   --  94   AST (SGOT) U/L 13 15  --  11  --  13  --   --   --  31   ALT (SGPT) U/L 8 8  --  7  --  8  --   --   --  17   CALCIUM mg/dL 9.1 9.8 9.4 9.5  --  9.3  --  9.4 10.2 10.3                 Microbiology   Microbiology Results (last 10 days)       Procedure Component Value - Date/Time    Clostridioides difficile Toxin - Stool, Per Rectum [[id number]]  (Abnormal) Collected: 06/07/24 8736    Lab Status: Final result Specimen: Stool from  Per Rectum Updated: 06/07/24 0701    Narrative:      The following orders were created for panel order Clostridioides difficile Toxin - Stool, Per Rectum.  Procedure                               Abnormality         Status                     ---------                               -----------         ------                     Clostridioides difficile...[[id number]]  Abnormal            Final result                 Please view results for these tests on the individual orders.    Clostridioides difficile EIA - Stool, Per Rectum [[id number]]  (Abnormal) Collected: 06/07/24 0438    Lab Status: Final result Specimen: Stool from Per Rectum Updated: 06/07/24 0701     C Diff GDH Ag Positive     C.diff Toxin Ag Positive    Narrative:      DNA from a toxigenic strain of C.difficile was detected, along with the presence of free toxin. These results are suggestive of C.difficile infection, in context of an appropriate clinical scenario.    CANDIDA AURIS PCR - Swab, Axilla Right, Axilla Left and Groin [[id number]]  (Normal) Collected: 06/06/24 0848    Lab Status: Final result Specimen: Swab from Axilla Right, Axilla Left and Groin Updated: 06/07/24 0948     CANDIDA AURIS PCR Not Detected    Gastrointestinal Panel, PCR - Stool, Per Rectum [[id number]]  (Normal) Collected: 06/05/24 1818    Lab Status: Final result Specimen: Stool from Per Rectum Updated: 06/05/24 2034     Campylobacter Not Detected     Plesiomonas shigelloides Not Detected     Salmonella Not Detected     Vibrio Not Detected     Vibrio cholerae Not Detected     Yersinia enterocolitica Not Detected     Enteroaggregative E. coli (EAEC) Not Detected     Enteropathogenic E. coli (EPEC) Not Detected     Enterotoxigenic E. coli (ETEC) lt/st Not Detected     Shiga-like toxin-producing E. coli (STEC) stx1/stx2 Not Detected     Shigella/Enteroinvasive E. coli (EIEC) Not Detected     Cryptosporidium Not Detected     Cyclospora cayetanensis Not Detected     Entamoeba  histolytica Not Detected     Giardia lamblia Not Detected     Adenovirus F40/41 Not Detected     Astrovirus Not Detected     Norovirus GI/GII Not Detected     Rotavirus A Not Detected     Sapovirus (I, II, IV or V) Not Detected    Narrative:      If Aeromonas, Staphylococcus aureus or Bacillus cereus are suspected, please order [id number]: Stool Culture, Aeromonas, S aureus, B Cereus.    Respiratory Panel PCR w/COVID-19(SARS-CoV-2) HODAN/MARIA M/TWILA/PAD/COR/JENISE In-House, NP Swab in UTM/VTM, 2 HR TAT - Swab, Nasopharynx [[id number]]  (Normal) Collected: 06/05/24 1224    Lab Status: Final result Specimen: Swab from Nasopharynx Updated: 06/05/24 1326     ADENOVIRUS, PCR Not Detected     Coronavirus 229E Not Detected     Coronavirus HKU1 Not Detected     Coronavirus NL63 Not Detected     Coronavirus OC43 Not Detected     COVID19 Not Detected     Human Metapneumovirus Not Detected     Human Rhinovirus/Enterovirus Not Detected     Influenza A PCR Not Detected     Influenza B PCR Not Detected     Parainfluenza Virus 1 Not Detected     Parainfluenza Virus 2 Not Detected     Parainfluenza Virus 3 Not Detected     Parainfluenza Virus 4 Not Detected     RSV, PCR Not Detected     Bordetella pertussis pcr Not Detected     Bordetella parapertussis PCR Not Detected     Chlamydophila pneumoniae PCR Not Detected     Mycoplasma pneumo by PCR Not Detected    Narrative:      In the setting of a positive respiratory panel with a viral infection PLUS a negative procalcitonin without other underlying concern for bacterial infection, consider observing off antibiotics or discontinuation of antibiotics and continue supportive care. If the respiratory panel is positive for atypical bacterial infection (Bordetella pertussis, Chlamydophila pneumoniae, or Mycoplasma pneumoniae), consider antibiotic de-escalation to target atypical bacterial infection.    Urine Culture - Urine, Straight Cath [[id number]]  (Abnormal)  (Susceptibility) Collected: 06/05/24  1224    Lab Status: Final result Specimen: Urine from Straight Cath Updated: 06/07/24 1039     Urine Culture >100,000 CFU/mL Escherichia coli ESBL     Comment:   Consider infectious disease consult.  Susceptibility results may not correlate to clinical outcomes.       Narrative:      Colonization of the urinary tract without infection is common. Treatment is discouraged unless the patient is symptomatic, pregnant, or undergoing an invasive urologic procedure.  Recent outcomes data supports the use of pip/tazo in the treatment of susceptible ESBL infections for uncomplicated UTI. Consider use of pip/tazo as a carbapenem-sparing regimen in applicable patients.    Susceptibility        Escherichia coli ESBL      ROBERT      Ertapenem Susceptible      Gentamicin Susceptible      Levofloxacin Resistant      Meropenem Susceptible      Nitrofurantoin Susceptible      Piperacillin + Tazobactam Susceptible      Trimethoprim + Sulfamethoxazole Resistant                           Blood Culture - Blood, Arm, Left [[id number]]  (Normal) Collected: 06/05/24 1223    Lab Status: Final result Specimen: Blood from Arm, Left Updated: 06/10/24 1246     Blood Culture No growth at 5 days    Blood Culture - Blood, Arm, Left [[id number]]  (Abnormal)  (Susceptibility) Collected: 06/05/24 1223    Lab Status: Final result Specimen: Blood from Arm, Left Updated: 06/09/24 0729     Blood Culture Escherichia coli ESBL     Comment:   Consider infectious disease consult.  Susceptibility results may not correlate to clinical outcomes.  For ESBL-producing infections in the blood, a carbapenem is recommended as first-line therapy for optimal clinical outcomes.        Isolated from Aerobic Bottle     Gram Stain Aerobic Bottle Gram negative bacilli    Narrative:      Less than seven (7) mL's of blood was collected.  Insufficient quantity may yield false negative results.    Susceptibility        Escherichia coli ESBL      ROBERT      Ertapenem Susceptible       Levofloxacin Resistant      Meropenem Susceptible      Trimethoprim + Sulfamethoxazole Susceptible                       Susceptibility Comments       Escherichia coli ESBL    Cefazolin sensitivity will not be reported for Enterobacteriaceae in non-urine isolates. If cefazolin is preferred, please call the microbiology lab to request an E-test.  With the exception of urinary-sourced infections, aminoglycosides should not be used as monotherapy.               Blood Culture ID, PCR - Blood, Arm, Left [[id number]]  (Abnormal) Collected: 06/05/24 1223    Lab Status: Final result Specimen: Blood from Arm, Left Updated: 06/07/24 0608     BCID, PCR Escherichia coli. Identification by BCID2 PCR.     BCID, PCR 2 CTX-M (ESBL) detected. Identification by BCID2 PCR     BOTTLE TYPE Aerobic Bottle            Medication Review:       Schedule Meds  apixaban, 5 mg, Oral, Q12H  cholestyramine light, 1 packet, Oral, Q12H  diazePAM, 10 mg, Oral, Nightly  dilTIAZem, 30 mg, Oral, Q8H  DULoxetine, 40 mg, Oral, Daily  famotidine, 20 mg, Oral, Daily  ferrous sulfate, 324 mg, Oral, Daily With Breakfast  furosemide, 40 mg, Oral, Daily  levothyroxine, 50 mcg, Oral, Q AM  meropenem, 1,000 mg, Intravenous, Q12H  metoprolol succinate XL, 50 mg, Oral, Daily  predniSONE, 5 mg, Oral, Daily  saccharomyces boulardii, 250 mg, Oral, BID  sodium chloride, 10 mL, Intravenous, Q12H  tacrolimus, 0.5 mg, Oral, BID  vancomycin, 250 mg, Oral, Q6H        Infusion Meds  Pharmacy Consult,         PRN Meds    acetaminophen    Calcium Replacement - Follow Nurse / BPA Driven Protocol    carboxymethylcellulose sod    diazePAM    diphenhydrAMINE    HYDROcodone-acetaminophen    loperamide    Magnesium Standard Dose Replacement - Follow Nurse / BPA Driven Protocol    melatonin    ondansetron    Pharmacy Consult    Phosphorus Replacement - Follow Nurse / BPA Driven Protocol    Potassium Replacement - Follow Nurse / BPA Driven Protocol    [COMPLETED] Insert Peripheral IV  **AND** sodium chloride    sodium chloride    sodium chloride        Assessment & Plan       Antimicrobial Therapy   1.  IV meropenem        2.  P.o. vancomycin        3.        4.        5.          Assessment     ESBL  E. coli bacteremia secondary to complicated UTI.  CT scan of abdomen pelvis showed no acute findings.  T     Complicated UTI with bacteremia.  Urine culture grew ESBL  E. coli.  Patient had recent episode of E. coli/ESBL  UTI and treated with p.o. fosfomycin     C. difficile colitis.  According to patient this is her second episode and she had 1 episode in 2013.  Currently has a fecal management system diarrhea is slowing down     Reactive leukocytosis secondary to above and steroids.  White count remained stable.  Trending down     History of COPD chronically on 4 L of oxygen via nasal cannula.  Currently on 3 L     History of kidney transplant in 2017 secondary to granulomatosis polyangiitis.  Currently on prednisone and Prograf     History of lung cancer in the past     Plan     Continue IV meropenem 1 g every 12 hours for total of 14 days-last day on June 20, 2024  Continue p.o. vancomycin  250 mg every 6 hours.  We will consider 3 weeks of treatment with p.o. vancomycin.  Continue probiotics  Continue supportive care  A.m. labs  Enteric isolation for C. difficile colitis  Contact isolation for ESBL  E. Coli  Appropriate PPE was placed on before entering the room         Dirk Robin MD  06/12/24  11:45 EDT    Note is dictated utilizing voice recognition software/Dragon

## 2024-06-12 NOTE — PLAN OF CARE
Goal Outcome Evaluation:      Pt. Demonstrates progress w/ HEP this date 10 reps x 1 set of BLE exercise including hip flexion/abduction, knee extension isometric exercises w/ rest breaks PRN secondary to fatigue. Pt. Progress limited secondary to continued O2 use w/ impacts to activity tolerance and ADL participation. Recommend SNF placement at d/c to address aforementioned deficits.

## 2024-06-12 NOTE — PROGRESS NOTES
LOS: 6 days   Patient Care Team:  Jonathan Luna APRN as PCP - General (Family Medicine)  Jak Gavin MD as Cardiologist (Cardiology)    Subjective     Interval History: Stable overnight     Patient Complaints: Complains of weakness    History taken from: patient    Review of Systems   Constitutional:  Positive for activity change, appetite change and fatigue. Negative for diaphoresis and fever.   HENT:  Negative for facial swelling.    Eyes:  Negative for visual disturbance.   Respiratory:  Negative for cough, shortness of breath, wheezing and stridor.    Cardiovascular:  Negative for chest pain, palpitations and leg swelling.   Gastrointestinal:  Positive for diarrhea. Negative for abdominal pain, nausea and vomiting.   Endocrine: Negative for polyuria.   Genitourinary:  Negative for frequency.   Musculoskeletal:  Positive for arthralgias and back pain.   Neurological:  Positive for weakness. Negative for light-headedness.   Psychiatric/Behavioral:  Negative for confusion.            Objective     Vital Signs  Temp:  [97.4 °F (36.3 °C)-98.4 °F (36.9 °C)] 98.4 °F (36.9 °C)  Heart Rate:  [] 67  Resp:  [17-24] 17  BP: (123-150)/(63-72) 150/72    Physical Exam:     General Appearance:    Alert, cooperative, in no acute distress, chronically ill-appearing   Head:    Normocephalic, without obvious abnormality, atraumatic   Eyes:            Lids and lashes normal, conjunctivae and sclerae normal, no   icterus, no pallor, corneas clear, PERRLA   Ears:    Ears appear intact with no abnormalities noted   Throat:   No oral lesions, no thrush, oral mucosa moist   Neck:   No adenopathy, supple, trachea midline, no thyromegaly, no   carotid bruit, no JVD   Lungs:     Clear to auscultation,respirations regular, even and                  unlabored    Heart:    Regular rhythm and normal rate, normal S1 and S2, no            murmur, no gallop, no rub, no click   Chest Wall:    No abnormalities observed   Abdomen:      Normal bowel sounds, no masses, no organomegaly, soft        Non-tender non-distended, no guarding,   Extremities:   Moves all extremities well, no edema, no cyanosis, no             Redness   Pulses:   Pulses palpable and equal bilaterally   Skin: Extensive superficial bruising from blood draws   Lymph nodes:   No palpable adenopathy   Neurologic:   Cranial nerves 2 - 12 grossly intact, sensation intact, DTR       present and equal bilaterally        Results Review:    Lab Results (last 24 hours)       Procedure Component Value Units Date/Time    Comprehensive Metabolic Panel [[id number]]  (Abnormal) Collected: 06/12/24 0556    Specimen: Blood Updated: 06/12/24 0633     Glucose 102 mg/dL      BUN 21 mg/dL      Creatinine 0.65 mg/dL      Sodium 138 mmol/L      Potassium 4.1 mmol/L      Chloride 103 mmol/L      CO2 29.9 mmol/L      Calcium 9.1 mg/dL      Total Protein 5.2 g/dL      Albumin 3.0 g/dL      ALT (SGPT) 8 U/L      AST (SGOT) 13 U/L      Alkaline Phosphatase 67 U/L      Total Bilirubin 0.3 mg/dL      Globulin 2.2 gm/dL      A/G Ratio 1.4 g/dL      BUN/Creatinine Ratio 32.3     Anion Gap 5.1 mmol/L      eGFR 90.8 mL/min/1.73     Narrative:      GFR Normal >60  Chronic Kidney Disease <60  Kidney Failure <15    The GFR formula is only valid for adults with stable renal function between ages 18 and 70.    CBC & Differential [[id number]]  (Abnormal) Collected: 06/12/24 0556    Specimen: Blood Updated: 06/12/24 0609    Narrative:      The following orders were created for panel order CBC & Differential.  Procedure                               Abnormality         Status                     ---------                               -----------         ------                     CBC Auto Differential[[id number]]        Abnormal            Final result                 Please view results for these tests on the individual orders.    CBC Auto Differential [[id number]]  (Abnormal) Collected: 06/12/24 0556    Specimen: Blood  Updated: 06/12/24 0609     WBC 11.28 10*3/mm3      RBC 3.67 10*6/mm3      Hemoglobin 10.3 g/dL      Hematocrit 34.7 %      MCV 94.6 fL      MCH 28.1 pg      MCHC 29.7 g/dL      RDW 15.9 %      RDW-SD 56.7 fl      MPV 9.4 fL      Platelets 213 10*3/mm3      Neutrophil % 77.1 %      Lymphocyte % 6.2 %      Monocyte % 10.4 %      Eosinophil % 2.5 %      Basophil % 0.3 %      Immature Grans % 3.5 %      Neutrophils, Absolute 8.71 10*3/mm3      Lymphocytes, Absolute 0.70 10*3/mm3      Monocytes, Absolute 1.17 10*3/mm3      Eosinophils, Absolute 0.28 10*3/mm3      Basophils, Absolute 0.03 10*3/mm3      Immature Grans, Absolute 0.39 10*3/mm3      nRBC 0.0 /100 WBC              Imaging Results (Last 24 Hours)       ** No results found for the last 24 hours. **                 I reviewed the patient's new clinical results.    Medication Review:   Scheduled Meds:apixaban, 5 mg, Oral, Q12H  cholestyramine light, 1 packet, Oral, Q12H  diazePAM, 10 mg, Oral, Nightly  dilTIAZem, 30 mg, Oral, Q8H  DULoxetine, 40 mg, Oral, Daily  famotidine, 20 mg, Oral, Daily  ferrous sulfate, 324 mg, Oral, Daily With Breakfast  furosemide, 40 mg, Oral, Daily  levothyroxine, 50 mcg, Oral, Q AM  meropenem, 1,000 mg, Intravenous, Q12H  metoprolol succinate XL, 50 mg, Oral, Daily  predniSONE, 5 mg, Oral, Daily  saccharomyces boulardii, 250 mg, Oral, BID  sodium chloride, 10 mL, Intravenous, Q12H  tacrolimus, 0.5 mg, Oral, BID  vancomycin, 250 mg, Oral, Q6H      Continuous Infusions:Pharmacy Consult,       PRN Meds:.  acetaminophen    Calcium Replacement - Follow Nurse / BPA Driven Protocol    carboxymethylcellulose sod    diazePAM    diphenhydrAMINE    loperamide    Magnesium Standard Dose Replacement - Follow Nurse / BPA Driven Protocol    melatonin    ondansetron    Pharmacy Consult    Phosphorus Replacement - Follow Nurse / BPA Driven Protocol    Potassium Replacement - Follow Nurse / BPA Driven Protocol    [COMPLETED] Insert Peripheral IV **AND**  sodium chloride    sodium chloride    sodium chloride     Assessment & Plan       UTI (urinary tract infection)    C. difficile colitis  -Stool is now more formed; added cholestyramine and removed fecal management system; continue Florastor and oral vancomycin  Renal transplant-lowered dose of tacrolimus per nephrology; continue prednisone  E. coli bacteremia-continue meropenem  Immunocompromised secondary to medications  Leukocytosis-resolving  Permanent atrial fib-continue diltiazem, anticoagulation  Chronic anemia-oral iron  Mood disorder-duloxetine  Hypothyroidism-levothyroxine  Insomnia-increased dose of diazepam at night.    Will need PICC line prior to discharge.  Hopefully can transfer back to SNF soon, in am if stable overnight    Plan for disposition: Return to SNF    TONYA Mathis  06/12/24  09:24 EDT

## 2024-06-12 NOTE — PROGRESS NOTES
"RENAL/KCC:     LOS: 6 days    Patient Care Team:  Jonathan Luna APRN as PCP - General (Family Medicine)  Jak Gavin MD as Cardiologist (Cardiology)    Chief Complaint:  Fever    Subjective     Interval History:   Chart reviewed  Denies any CP or SOA  No worsening edema      Objective     Vital Sign Min/Max for last 24 hours  Temp  Min: 97.4 °F (36.3 °C)  Max: 97.6 °F (36.4 °C)   BP  Min: 123/67  Max: 150/72   Pulse  Min: 72  Max: 108   Resp  Min: 17  Max: 24   SpO2  Min: 73 %  Max: 100 %   Flow (L/min)  Min: 3  Max: 3   No data recorded     Flowsheet Rows      Flowsheet Row First Filed Value   Admission Height 167.6 cm (66\") Documented at 06/05/2024 1152   Admission Weight 71.3 kg (157 lb 3 oz) Documented at 06/05/2024 1152            I/O this shift:  In: 240 [P.O.:240]  Out: -   I/O last 3 completed shifts:  In: 1426 [P.O.:990; I.V.:416; Other:20]  Out: 950 [Urine:800; Stool:150]    Physical Exam:  GEN: Awake, NAD  ENT: PERRL, EOMI, MMM  NECK: Supple, no JVD  CHEST: CTAB, no W/R/C  CV: RRR, no M/G/R  ABD: Soft, NT, +BS  SKIN: Warm and Dry  NEURO: CN's intact      WBC WBC   Date Value Ref Range Status   06/12/2024 11.28 (H) 3.40 - 10.80 10*3/mm3 Final   06/11/2024 11.85 (H) 3.40 - 10.80 10*3/mm3 Final   06/10/2024 11.97 (H) 3.40 - 10.80 10*3/mm3 Final      HGB Hemoglobin   Date Value Ref Range Status   06/12/2024 10.3 (L) 12.0 - 15.9 g/dL Final   06/11/2024 10.8 (L) 12.0 - 15.9 g/dL Final   06/10/2024 10.6 (L) 12.0 - 15.9 g/dL Final      HCT Hematocrit   Date Value Ref Range Status   06/12/2024 34.7 34.0 - 46.6 % Final   06/11/2024 35.2 34.0 - 46.6 % Final   06/10/2024 35.3 34.0 - 46.6 % Final      Platlets No results found for: \"LABPLAT\"   MCV MCV   Date Value Ref Range Status   06/12/2024 94.6 79.0 - 97.0 fL Final   06/11/2024 93.1 79.0 - 97.0 fL Final   06/10/2024 94.6 79.0 - 97.0 fL Final          Sodium Sodium   Date Value Ref Range Status   06/12/2024 138 136 - 145 mmol/L Final   06/11/2024 132 (L) 136 - " "145 mmol/L Final   06/10/2024 129 (L) 136 - 145 mmol/L Final      Potassium Potassium   Date Value Ref Range Status   06/12/2024 4.1 3.5 - 5.2 mmol/L Final   06/11/2024 4.1 3.5 - 5.2 mmol/L Final   06/10/2024 4.0 3.5 - 5.2 mmol/L Final      Chloride Chloride   Date Value Ref Range Status   06/12/2024 103 98 - 107 mmol/L Final   06/11/2024 98 98 - 107 mmol/L Final   06/10/2024 94 (L) 98 - 107 mmol/L Final      CO2 CO2   Date Value Ref Range Status   06/12/2024 29.9 (H) 22.0 - 29.0 mmol/L Final   06/11/2024 29.6 (H) 22.0 - 29.0 mmol/L Final   06/10/2024 28.9 22.0 - 29.0 mmol/L Final      BUN BUN   Date Value Ref Range Status   06/12/2024 21 8 - 23 mg/dL Final   06/11/2024 27 (H) 8 - 23 mg/dL Final   06/10/2024 37 (H) 8 - 23 mg/dL Final      Creatinine Creatinine   Date Value Ref Range Status   06/12/2024 0.65 0.57 - 1.00 mg/dL Final   06/11/2024 0.78 0.57 - 1.00 mg/dL Final   06/10/2024 0.97 0.57 - 1.00 mg/dL Final      Calcium Calcium   Date Value Ref Range Status   06/12/2024 9.1 8.6 - 10.5 mg/dL Final   06/11/2024 9.8 8.6 - 10.5 mg/dL Final   06/10/2024 9.4 8.6 - 10.5 mg/dL Final      PO4 No results found for: \"CAPO4\"   Albumin Albumin   Date Value Ref Range Status   06/12/2024 3.0 (L) 3.5 - 5.2 g/dL Final   06/11/2024 3.1 (L) 3.5 - 5.2 g/dL Final      Magnesium No results found for: \"MG\"     Uric Acid No results found for: \"URICACID\"        Results Review:     I reviewed the patient's new clinical results.    apixaban, 5 mg, Oral, Q12H  cholestyramine light, 1 packet, Oral, Q12H  diazePAM, 10 mg, Oral, Nightly  dilTIAZem, 30 mg, Oral, Q8H  DULoxetine, 40 mg, Oral, Daily  famotidine, 20 mg, Oral, Daily  ferrous sulfate, 324 mg, Oral, Daily With Breakfast  levothyroxine, 50 mcg, Oral, Q AM  meropenem, 1,000 mg, Intravenous, Q12H  metoprolol succinate XL, 50 mg, Oral, Daily  predniSONE, 5 mg, Oral, Daily  saccharomyces boulardii, 250 mg, Oral, BID  sodium chloride, 10 mL, Intravenous, Q12H  tacrolimus, 0.5 mg, Oral, " BID  vancomycin, 250 mg, Oral, Q6H      Pharmacy Consult,         Medication Review: Reviewed    Assessment & Plan     1) TONYA  2) DDKT  3) Chronic ImmunoRx  4) UTI - E. Coli/ESBL  5) ESBL Bacteremia  6) C. Diff  7) Lactic acidosis  8) Tachycardia  9) Hypokalemia  10) CHF  11) Hyponatremia     PLAN: Cr improved to 0.65 = baseline.  Resume daily Lasix 40 mg PO.  ABX for UTI, bacteremia and C. Diff per primary/ID.  Jardiance D/C'd indefinitely.  Na WNL.  Recent FK levels have been above goal of 3-5.  Decreased Prograf to 0.5 mg BID.  Will follow.      Naun Falcon MD  [facility]  06/12/24  08:45 EDT

## 2024-06-12 NOTE — CASE MANAGEMENT/SOCIAL WORK
Continued Stay Note   Victor Hugo     Patient Name: Jaja Carcamo  MRN: [id number]  Today's Date: 6/12/2024    Admit Date: 6/5/2024    Plan: DC Plan: Return to [facility] accepted and following. Precert started 6/10/2024 and approved 6/11/2024. No PASRR needed. Current with [facility] for Home O2@3-4 liters. Will need transport.   Discharge Plan       Row Name 06/12/24 1434       Plan    Plan DC Plan: Return to [facility] accepted and following. Precert started 6/10/2024 and approved 6/11/2024. No PASRR needed. Current with [facility] for Home O2@3-4 liters. Will need transport.    Patient/Family in Agreement with Plan yes    Provided Post Acute Provider List? N/A    Provided Post Acute Provider Quality & Resource List? N/A    Plan Comments CM spoke with patient’s MD and also reviewed chart documentation to obtain clinical updates. Plan to have PICC line placed today and monitor overnight. Plan to DC tomorrow morning 6/13/2024 if she remains stable.CM will continue to follow for any further needs and adjust discharge plan accordingly. DC Barriers: Cardiac monitoring, O2@3L nc, IV abx, and monitor labs.               Expected Discharge Date and Time       Expected Discharge Date Expected Discharge Time    Jun 13, 2024           Phone communication or documentation only- no physical contact with patient or family.      Karina Teresa RN     Office Phone: [phone]  Office Cell:     [phone]

## 2024-06-13 VITALS
SYSTOLIC BLOOD PRESSURE: 146 MMHG | DIASTOLIC BLOOD PRESSURE: 78 MMHG | OXYGEN SATURATION: 98 % | RESPIRATION RATE: 21 BRPM | HEART RATE: 90 BPM | WEIGHT: 164.02 LBS | BODY MASS INDEX: 26.36 KG/M2 | TEMPERATURE: 98.1 F | HEIGHT: 66 IN

## 2024-06-13 LAB
ANION GAP SERPL CALCULATED.3IONS-SCNC: 5.9 MMOL/L (ref 5–15)
BUN SERPL-MCNC: 17 MG/DL (ref 8–23)
BUN/CREAT SERPL: 27.9 (ref 7–25)
CALCIUM SPEC-SCNC: 9.6 MG/DL (ref 8.6–10.5)
CHLORIDE SERPL-SCNC: 101 MMOL/L (ref 98–107)
CO2 SERPL-SCNC: 30.1 MMOL/L (ref 22–29)
CREAT SERPL-MCNC: 0.61 MG/DL (ref 0.57–1)
EGFRCR SERPLBLD CKD-EPI 2021: 92.2 ML/MIN/1.73
GLUCOSE SERPL-MCNC: 95 MG/DL (ref 65–99)
POTASSIUM SERPL-SCNC: 4.5 MMOL/L (ref 3.5–5.2)
SODIUM SERPL-SCNC: 137 MMOL/L (ref 136–145)

## 2024-06-13 PROCEDURE — 25010000002 ONDANSETRON PER 1 MG

## 2024-06-13 PROCEDURE — 80048 BASIC METABOLIC PNL TOTAL CA: CPT | Performed by: INTERNAL MEDICINE

## 2024-06-13 PROCEDURE — 36410 VNPNXR 3YR/> PHY/QHP DX/THER: CPT

## 2024-06-13 PROCEDURE — 97530 THERAPEUTIC ACTIVITIES: CPT

## 2024-06-13 PROCEDURE — 25010000002 MEROPENEM PER 100 MG: Performed by: INTERNAL MEDICINE

## 2024-06-13 PROCEDURE — 25010000002 LIDOCAINE 1 % SOLUTION: Performed by: NURSE PRACTITIONER

## 2024-06-13 PROCEDURE — 97116 GAIT TRAINING THERAPY: CPT

## 2024-06-13 PROCEDURE — 63710000001 PREDNISONE PER 5 MG

## 2024-06-13 PROCEDURE — 80197 ASSAY OF TACROLIMUS: CPT | Performed by: INTERNAL MEDICINE

## 2024-06-13 PROCEDURE — 63710000001 TACROLIMUS PER 1 MG: Performed by: INTERNAL MEDICINE

## 2024-06-13 PROCEDURE — C1751 CATH, INF, PER/CENT/MIDLINE: HCPCS

## 2024-06-13 RX ORDER — TACROLIMUS 0.5 MG/1
0.5 CAPSULE ORAL 2 TIMES DAILY
Qty: 60 CAPSULE | Refills: 1 | Status: SHIPPED | OUTPATIENT
Start: 2024-06-13

## 2024-06-13 RX ORDER — SODIUM CHLORIDE 0.9 % (FLUSH) 0.9 %
10 SYRINGE (ML) INJECTION AS NEEDED
Status: DISCONTINUED | OUTPATIENT
Start: 2024-06-13 | End: 2024-06-13 | Stop reason: HOSPADM

## 2024-06-13 RX ORDER — SODIUM CHLORIDE 9 MG/ML
40 INJECTION, SOLUTION INTRAVENOUS AS NEEDED
Status: DISCONTINUED | OUTPATIENT
Start: 2024-06-13 | End: 2024-06-13 | Stop reason: HOSPADM

## 2024-06-13 RX ORDER — FUROSEMIDE 40 MG/1
40 TABLET ORAL DAILY
Qty: 30 TABLET | Refills: 1 | Status: SHIPPED | OUTPATIENT
Start: 2024-06-14

## 2024-06-13 RX ORDER — VANCOMYCIN HYDROCHLORIDE 250 MG/1
250 CAPSULE ORAL EVERY 6 HOURS SCHEDULED
Qty: 15 CAPSULE | Refills: 0 | Status: SHIPPED | OUTPATIENT
Start: 2024-06-13 | End: 2024-06-17

## 2024-06-13 RX ORDER — SACCHAROMYCES BOULARDII 250 MG
250 CAPSULE ORAL 2 TIMES DAILY
Qty: 60 CAPSULE | Refills: 1 | Status: SHIPPED | OUTPATIENT
Start: 2024-06-13

## 2024-06-13 RX ORDER — SODIUM CHLORIDE 0.9 % (FLUSH) 0.9 %
10 SYRINGE (ML) INJECTION EVERY 12 HOURS SCHEDULED
Status: DISCONTINUED | OUTPATIENT
Start: 2024-06-13 | End: 2024-06-13 | Stop reason: HOSPADM

## 2024-06-13 RX ORDER — CHOLESTYRAMINE LIGHT 4 G/5.7G
1 POWDER, FOR SUSPENSION ORAL EVERY 12 HOURS SCHEDULED
Qty: 30 EACH | Refills: 1 | Status: SHIPPED | OUTPATIENT
Start: 2024-06-13

## 2024-06-13 RX ORDER — LIDOCAINE HYDROCHLORIDE 10 MG/ML
20 INJECTION, SOLUTION INFILTRATION; PERINEURAL ONCE
Status: COMPLETED | OUTPATIENT
Start: 2024-06-13 | End: 2024-06-13

## 2024-06-13 RX ADMIN — ONDANSETRON 4 MG: 2 INJECTION INTRAMUSCULAR; INTRAVENOUS at 16:01

## 2024-06-13 RX ADMIN — LOPERAMIDE HYDROCHLORIDE 2 MG: 2 CAPSULE ORAL at 16:01

## 2024-06-13 RX ADMIN — PREDNISONE 5 MG: 5 TABLET ORAL at 09:44

## 2024-06-13 RX ADMIN — APIXABAN 5 MG: 5 TABLET, FILM COATED ORAL at 09:44

## 2024-06-13 RX ADMIN — Medication 10 ML: at 13:34

## 2024-06-13 RX ADMIN — DILTIAZEM HYDROCHLORIDE 30 MG: 30 TABLET, FILM COATED ORAL at 13:33

## 2024-06-13 RX ADMIN — FUROSEMIDE 40 MG: 40 TABLET ORAL at 09:44

## 2024-06-13 RX ADMIN — Medication 10 ML: at 09:47

## 2024-06-13 RX ADMIN — Medication 250 MG: at 09:44

## 2024-06-13 RX ADMIN — VANCOMYCIN HYDROCHLORIDE 250 MG: 250 CAPSULE ORAL at 05:44

## 2024-06-13 RX ADMIN — LIDOCAINE HYDROCHLORIDE 20 ML: 10 INJECTION, SOLUTION INFILTRATION; PERINEURAL at 13:33

## 2024-06-13 RX ADMIN — MEROPENEM 1000 MG: 1 INJECTION, POWDER, FOR SOLUTION INTRAVENOUS at 09:43

## 2024-06-13 RX ADMIN — LEVOTHYROXINE SODIUM 50 MCG: 0.05 TABLET ORAL at 05:44

## 2024-06-13 RX ADMIN — DILTIAZEM HYDROCHLORIDE 30 MG: 30 TABLET, FILM COATED ORAL at 05:44

## 2024-06-13 RX ADMIN — FERROUS SULFATE TAB EC 324 MG (65 MG FE EQUIVALENT) 324 MG: 324 (65 FE) TABLET DELAYED RESPONSE at 09:44

## 2024-06-13 RX ADMIN — DULOXETINE HYDROCHLORIDE 40 MG: 20 CAPSULE, DELAYED RELEASE ORAL at 09:44

## 2024-06-13 RX ADMIN — MEROPENEM 1000 MG: 1 INJECTION, POWDER, FOR SOLUTION INTRAVENOUS at 01:06

## 2024-06-13 RX ADMIN — METOPROLOL SUCCINATE 50 MG: 50 TABLET, EXTENDED RELEASE ORAL at 09:44

## 2024-06-13 RX ADMIN — VANCOMYCIN HYDROCHLORIDE 250 MG: 250 CAPSULE ORAL at 13:33

## 2024-06-13 RX ADMIN — FAMOTIDINE 20 MG: 20 TABLET, FILM COATED ORAL at 09:44

## 2024-06-13 RX ADMIN — TACROLIMUS 0.5 MG: 0.5 CAPSULE ORAL at 09:44

## 2024-06-13 RX ADMIN — CHOLESTYRAMINE 4 G: 4 POWDER, FOR SUSPENSION ORAL at 09:44

## 2024-06-13 NOTE — CASE MANAGEMENT/SOCIAL WORK
Case Management Discharge Note      Final Note: [facility].    Provided Post Acute Provider List?: N/A  Provided Post Acute Provider Quality & Resource List?: N/A    Selected Continued Care - Admitted Since 6/5/2024       Destination Coordination complete.      Service Provider Selected Services Address Phone Fax Patient Preferred    [facility] [address] [place] [address] [phone] --               Transportation Services  W/C Van: Carl Fritz    Final Discharge Disposition Code: 03 - skilled nursing facility (SNF)      Continued Stay Note   Victor Hugo     Patient Name: Jaja Carcamo  MRN: [id number]  Today's Date: 6/13/2024    Admit Date: 6/5/2024    Plan: DC Plan: Return to [facility] accepted and following. Precert started 6/10/2024 and approved 6/11/2024. No PASRR needed. Current with [facility] for Home O2@3-4 liters. Will need transport.   Discharge Plan       Row Name 06/13/24 1316       Plan    Final Discharge Disposition Code 03 - skilled nursing facility (SNF)    Final Note [facility].      Row Name 06/13/24 1315       Plan    Plan DC Plan: Return to [facility] accepted and following. Precert started 6/10/2024 and approved 6/11/2024. No PASRR needed. Current with [facility] for Home O2@3-4 liters. Will need transport.    Patient/Family in Agreement with Plan yes    Provided Post Acute Provider List? N/A    Provided Post Acute Provider Quality & Resource List? N/A    Plan Comments  notified liaison that patient would be returning to facility today. Request for wheelchair van submitted.             Expected Discharge Date and Time       Expected Discharge Date Expected Discharge Time    Jun 13, 2024        Yoana Judge RN     Office Phone [phone]

## 2024-06-13 NOTE — CONSULTS
Powerglide midline placed in left upper arm under ultrasound guidance. Flushes easily and blood return noted. Patient tolerated well. RN notified ok to use.

## 2024-06-13 NOTE — PROGRESS NOTES
"RENAL/KCC:     LOS: 7 days    Patient Care Team:  Jonathan Luna APRN as PCP - General (Family Medicine)  Jak Gavin MD as Cardiologist (Cardiology)    Chief Complaint:  Fever    Subjective     Interval History:   Chart reviewed  Denies any CP or SOA  No worsening edema      Objective     Vital Sign Min/Max for last 24 hours  Temp  Min: 97.6 °F (36.4 °C)  Max: 98.4 °F (36.9 °C)   BP  Min: 129/76  Max: 149/77   Pulse  Min: 67  Max: 106   Resp  Min: 16  Max: 20   SpO2  Min: 99 %  Max: 100 %   Flow (L/min)  Min: 3  Max: 4   Weight  Min: 74.4 kg (164 lb 0.4 oz)  Max: 74.4 kg (164 lb 0.4 oz)     Flowsheet Rows      Flowsheet Row First Filed Value   Admission Height 167.6 cm (66\") Documented at 06/05/2024 1152   Admission Weight 71.3 kg (157 lb 3 oz) Documented at 06/05/2024 1152            I/O this shift:  In: 291 [I.V.:291]  Out: 400 [Urine:400]  I/O last 3 completed shifts:  In: 1499 [P.O.:1195; I.V.:304]  Out: 0     Physical Exam:  GEN: Awake, NAD  ENT: PERRL, EOMI, MMM  NECK: Supple, no JVD  CHEST: CTAB, no W/R/C  CV: RRR, no M/G/R  ABD: Soft, NT, +BS  SKIN: Warm and Dry  NEURO: CN's intact      WBC WBC   Date Value Ref Range Status   06/12/2024 11.28 (H) 3.40 - 10.80 10*3/mm3 Final   06/11/2024 11.85 (H) 3.40 - 10.80 10*3/mm3 Final      HGB Hemoglobin   Date Value Ref Range Status   06/12/2024 10.3 (L) 12.0 - 15.9 g/dL Final   06/11/2024 10.8 (L) 12.0 - 15.9 g/dL Final      HCT Hematocrit   Date Value Ref Range Status   06/12/2024 34.7 34.0 - 46.6 % Final   06/11/2024 35.2 34.0 - 46.6 % Final      Platlets No results found for: \"LABPLAT\"   MCV MCV   Date Value Ref Range Status   06/12/2024 94.6 79.0 - 97.0 fL Final   06/11/2024 93.1 79.0 - 97.0 fL Final          Sodium Sodium   Date Value Ref Range Status   06/13/2024 137 136 - 145 mmol/L Final   06/12/2024 138 136 - 145 mmol/L Final   06/11/2024 132 (L) 136 - 145 mmol/L Final      Potassium Potassium   Date Value Ref Range Status   06/13/2024 4.5 3.5 - 5.2 " "mmol/L Final   06/12/2024 4.1 3.5 - 5.2 mmol/L Final   06/11/2024 4.1 3.5 - 5.2 mmol/L Final      Chloride Chloride   Date Value Ref Range Status   06/13/2024 101 98 - 107 mmol/L Final   06/12/2024 103 98 - 107 mmol/L Final   06/11/2024 98 98 - 107 mmol/L Final      CO2 CO2   Date Value Ref Range Status   06/13/2024 30.1 (H) 22.0 - 29.0 mmol/L Final   06/12/2024 29.9 (H) 22.0 - 29.0 mmol/L Final   06/11/2024 29.6 (H) 22.0 - 29.0 mmol/L Final      BUN BUN   Date Value Ref Range Status   06/13/2024 17 8 - 23 mg/dL Final   06/12/2024 21 8 - 23 mg/dL Final   06/11/2024 27 (H) 8 - 23 mg/dL Final      Creatinine Creatinine   Date Value Ref Range Status   06/13/2024 0.61 0.57 - 1.00 mg/dL Final   06/12/2024 0.65 0.57 - 1.00 mg/dL Final   06/11/2024 0.78 0.57 - 1.00 mg/dL Final      Calcium Calcium   Date Value Ref Range Status   06/13/2024 9.6 8.6 - 10.5 mg/dL Final   06/12/2024 9.1 8.6 - 10.5 mg/dL Final   06/11/2024 9.8 8.6 - 10.5 mg/dL Final      PO4 No results found for: \"CAPO4\"   Albumin Albumin   Date Value Ref Range Status   06/12/2024 3.0 (L) 3.5 - 5.2 g/dL Final   06/11/2024 3.1 (L) 3.5 - 5.2 g/dL Final      Magnesium No results found for: \"MG\"     Uric Acid No results found for: \"URICACID\"        Results Review:     I reviewed the patient's new clinical results.    apixaban, 5 mg, Oral, Q12H  cholestyramine light, 1 packet, Oral, Q12H  diazePAM, 10 mg, Oral, Nightly  dilTIAZem, 30 mg, Oral, Q8H  DULoxetine, 40 mg, Oral, Daily  famotidine, 20 mg, Oral, Daily  ferrous sulfate, 324 mg, Oral, Daily With Breakfast  furosemide, 40 mg, Oral, Daily  levothyroxine, 50 mcg, Oral, Q AM  meropenem, 1,000 mg, Intravenous, Q8H  metoprolol succinate XL, 50 mg, Oral, Daily  predniSONE, 5 mg, Oral, Daily  saccharomyces boulardii, 250 mg, Oral, BID  sodium chloride, 10 mL, Intravenous, Q12H  tacrolimus, 0.5 mg, Oral, BID  vancomycin, 250 mg, Oral, Q6H      Pharmacy Consult,         Medication Review: Reviewed    Assessment & Plan "     1) TONYA  2) DDKT  3) Chronic ImmunoRx  4) UTI - E. Coli/ESBL  5) ESBL Bacteremia  6) C. Diff  7) Lactic acidosis  8) Tachycardia  9) Hypokalemia  10) CHF  11) Hyponatremia     PLAN: Cr stable at 0.6 = baseline.  On Lasix 40 mg PO daily.  ABX for UTI, bacteremia and C. Diff per primary/ID.  Jardiance D/C'd indefinitely.  Na WNL.  Prograf decreased to 0.5 mg BID.  F/U level being drawn today.  Will follow.      Naun Falcon MD  [facility]  06/13/24  06:53 EDT

## 2024-06-13 NOTE — PLAN OF CARE
Problem: Fall Injury Risk  Goal: Absence of Fall and Fall-Related Injury  Outcome: Ongoing, Progressing  Intervention: Identify and Manage Contributors  Recent Flowsheet Documentation  Taken 6/13/2024 1600 by Ernestine Rivers RN  Medication Review/Management: medications reviewed  Self-Care Promotion: independence encouraged  Taken 6/13/2024 1200 by Ernestine Rivers RN  Medication Review/Management: medications reviewed  Taken 6/13/2024 0800 by Ernestine Rivers RN  Medication Review/Management: medications reviewed  Self-Care Promotion: independence encouraged  Intervention: Promote Injury-Free Environment  Recent Flowsheet Documentation  Taken 6/13/2024 1600 by Ernestine Rivers RN  Safety Promotion/Fall Prevention:   safety round/check completed   fall prevention program maintained  Taken 6/13/2024 1400 by Ernestine Rivers RN  Safety Promotion/Fall Prevention: safety round/check completed  Taken 6/13/2024 1200 by Ernestine Rivers RN  Safety Promotion/Fall Prevention: safety round/check completed  Taken 6/13/2024 1000 by Ernestine Rivers RN  Safety Promotion/Fall Prevention: safety round/check completed  Taken 6/13/2024 0800 by Ernestine Rivers RN  Safety Promotion/Fall Prevention: safety round/check completed     Problem: Adjustment to Illness (Sepsis/Septic Shock)  Goal: Optimal Coping  Outcome: Ongoing, Progressing  Intervention: Optimize Psychosocial Adjustment to Illness  Recent Flowsheet Documentation  Taken 6/13/2024 1600 by Ernestine Rivers RN  Supportive Measures: active listening utilized  Family/Support System Care: support provided  Taken 6/13/2024 1200 by Ernestine Rivers RN  Supportive Measures: active listening utilized  Family/Support System Care:   support provided   self-care encouraged  Taken 6/13/2024 0800 by Ernestine Rivers RN  Supportive Measures: active listening utilized  Family/Support System Care:   support provided   self-care encouraged     Problem: Bleeding (Sepsis/Septic Shock)  Goal: Absence of  Bleeding  Outcome: Ongoing, Progressing     Problem: Glycemic Control Impaired (Sepsis/Septic Shock)  Goal: Blood Glucose Level Within Desired Range  Outcome: Ongoing, Progressing     Problem: Infection Progression (Sepsis/Septic Shock)  Goal: Absence of Infection Signs and Symptoms  Outcome: Ongoing, Progressing  Intervention: Initiate Sepsis Management  Recent Flowsheet Documentation  Taken 6/13/2024 1600 by Ernestine Rivers RN  Isolation Precautions:   precautions maintained   spore   contact  Taken 6/13/2024 1200 by Ernestine Rivers RN  Isolation Precautions:   precautions maintained   contact   spore  Taken 6/13/2024 0800 by Ernestine Rivers RN  Infection Prevention:   hand hygiene promoted   rest/sleep promoted   single patient room provided  Isolation Precautions:   precautions maintained   contact   spore  Intervention: Promote Recovery  Recent Flowsheet Documentation  Taken 6/13/2024 1600 by Ernestine Rivers RN  Activity Management: bedrest  Taken 6/13/2024 1200 by Ernestine Rivers RN  Activity Management: bedrest  Taken 6/13/2024 0800 by Ernestine Rivers RN  Activity Management:   bedrest   activity encouraged  Intervention: Promote Stabilization  Recent Flowsheet Documentation  Taken 6/13/2024 0800 by Ernestine Rivers RN  Fluid/Electrolyte Management: fluids restricted     Problem: Nutrition Impaired (Sepsis/Septic Shock)  Goal: Optimal Nutrition Intake  Outcome: Ongoing, Progressing     Problem: Adult Inpatient Plan of Care  Goal: Plan of Care Review  Outcome: Ongoing, Progressing  Flowsheets (Taken 6/13/2024 1651)  Progress: improving  Plan of Care Reviewed With: patient  Goal: Patient-Specific Goal (Individualized)  Outcome: Ongoing, Progressing  Goal: Absence of Hospital-Acquired Illness or Injury  Outcome: Ongoing, Progressing  Intervention: Identify and Manage Fall Risk  Recent Flowsheet Documentation  Taken 6/13/2024 1600 by Ernestine Rivers RN  Safety Promotion/Fall Prevention:   safety round/check completed    fall prevention program maintained  Taken 6/13/2024 1400 by Ernestine Rivers RN  Safety Promotion/Fall Prevention: safety round/check completed  Taken 6/13/2024 1200 by Ernestine Rivers RN  Safety Promotion/Fall Prevention: safety round/check completed  Taken 6/13/2024 1000 by Ernestine Rivers RN  Safety Promotion/Fall Prevention: safety round/check completed  Taken 6/13/2024 0800 by Ernestine Rivesr RN  Safety Promotion/Fall Prevention: safety round/check completed  Intervention: Prevent Skin Injury  Recent Flowsheet Documentation  Taken 6/13/2024 1600 by Ernestine Rivers RN  Body Position: dangle, side of bed  Skin Protection: adhesive use limited  Taken 6/13/2024 1200 by Ernestine Rivers RN  Body Position:   right   turned  Skin Protection: adhesive use limited  Taken 6/13/2024 1000 by Ernestine Rivers RN  Body Position:   left   turned   weight shifting  Taken 6/13/2024 0800 by Ernestine Rivers RN  Body Position:   turned   weight shifting   right  Skin Protection: adhesive use limited  Intervention: Prevent and Manage VTE (Venous Thromboembolism) Risk  Recent Flowsheet Documentation  Taken 6/13/2024 1600 by Ernestine Rivers RN  Activity Management: bedrest  Taken 6/13/2024 1200 by Ernestine Rivers RN  Activity Management: bedrest  Taken 6/13/2024 0800 by Ernestine Rivers RN  Activity Management:   bedrest   activity encouraged  VTE Prevention/Management:   bilateral   sequential compression devices off  Range of Motion: active ROM (range of motion) encouraged  Intervention: Prevent Infection  Recent Flowsheet Documentation  Taken 6/13/2024 0800 by Ernestine Rivers RN  Infection Prevention:   hand hygiene promoted   rest/sleep promoted   single patient room provided  Goal: Optimal Comfort and Wellbeing  Outcome: Ongoing, Progressing  Intervention: Provide Person-Centered Care  Recent Flowsheet Documentation  Taken 6/13/2024 1600 by Ernestine Rivers RN  Trust Relationship/Rapport:   care explained   questions answered   thoughts/feelings  acknowledged  Taken 6/13/2024 1200 by Ernestine Rivers RN  Trust Relationship/Rapport:   care explained   questions answered   thoughts/feelings acknowledged  Taken 6/13/2024 0800 by Ernestine Rivers RN  Trust Relationship/Rapport:   care explained   questions answered   thoughts/feelings acknowledged  Goal: Readiness for Transition of Care  Outcome: Ongoing, Progressing     Problem: Skin Injury Risk Increased  Goal: Skin Health and Integrity  Outcome: Ongoing, Progressing  Intervention: Optimize Skin Protection  Recent Flowsheet Documentation  Taken 6/13/2024 1600 by Ernestine Rivers RN  Pressure Reduction Techniques: weight shift assistance provided  Head of Bed (HOB) Positioning: HOB at 30 degrees  Pressure Reduction Devices: pressure-redistributing mattress utilized  Skin Protection: adhesive use limited  Taken 6/13/2024 1200 by Ernestine Rivers RN  Pressure Reduction Techniques: weight shift assistance provided  Head of Bed (HOB) Positioning: HOB at 30 degrees  Pressure Reduction Devices: pressure-redistributing mattress utilized  Skin Protection: adhesive use limited  Taken 6/13/2024 0800 by Ernestine Rivers RN  Pressure Reduction Techniques: weight shift assistance provided  Head of Bed (HOB) Positioning: HOB at 30 degrees  Pressure Reduction Devices: pressure-redistributing mattress utilized  Skin Protection: adhesive use limited     Problem: COPD (Chronic Obstructive Pulmonary Disease) Comorbidity  Goal: Maintenance of COPD Symptom Control  Outcome: Ongoing, Progressing  Intervention: Maintain COPD-Symptom Control  Recent Flowsheet Documentation  Taken 6/13/2024 1600 by Ernestine Rivers RN  Supportive Measures: active listening utilized  Medication Review/Management: medications reviewed  Taken 6/13/2024 1200 by Ernestine Rivers RN  Supportive Measures: active listening utilized  Medication Review/Management: medications reviewed  Taken 6/13/2024 0800 by Ernestine Rivers RN  Supportive Measures: active listening  utilized  Medication Review/Management: medications reviewed     Problem: Diabetes Comorbidity  Goal: Blood Glucose Level Within Targeted Range  Outcome: Ongoing, Progressing   Goal Outcome Evaluation:  Plan of Care Reviewed With: patient        Progress: improving     Report called given to Danna. Son notified of transfer.

## 2024-06-13 NOTE — DISCHARGE SUMMARY
Date of Discharge:  6/13/2024    Discharge Diagnosis:   Urinary tract infection  C. Difficile colitis  E. Coli bacteremia  Immocompromised  Leukocytosis  Permanent atrial fibrillation  Chronic anemia  Mood disorder  Hypothyroidism  Insomnia    Presenting Problem/History of Present Illness  Active Hospital Problems    Diagnosis  POA    **UTI (urinary tract infection) [N39.0]  Yes    C. difficile colitis [A04.72]  Unknown      Resolved Hospital Problems   No resolved problems to display.          Hospital Course    Patient is a 79 y.o. female presented with  presented from [facility] facility with reports of fever.  Patient has recently been discharged from hospital on 6/3/24 to [facility]. Patient reports she has had a decline in oral intake, and not felt well she has a significant past medical history of kidney transplant on antirejection medications.  She was recently hospitalized and treated for ESBL E. coli.  While in ED patient had a temperature of 103.7 with tachycardia, triggering sepsis. Patient was found to have UTI, c.difficile colitis, and e.coli bacteremia. She has done well and back to baseline. Hemodynamically stable, appetite has improved. She will have a midline placed today and Continue IV meropenem 1 g every 12 hours for total of 14 days-last day on June 20, 2024 with continue p.o. vancomycin  250 mg every 6 hours. She will need 3 weeks of treatment with p.o. vancomycin. And continue probiotics. She will need to follow up with PCP after rehab and midline needs to be pulled after last antibiotics    Procedures Performed         Consults:   Consults       Date and Time Order Name Status Description    6/7/2024  7:38 AM Inpatient Infectious Diseases Consult Completed     6/5/2024  4:10 PM Inpatient Nephrology Consult Completed     6/5/2024  3:06 PM Family Medicine Consult      5/20/2024  2:37 PM Inpatient Infectious Diseases Consult Completed     5/20/2024  9:24 AM Inpatient Pulmonology Consult  Completed     5/18/2024  8:57 AM Inpatient Cardiology Consult Completed             Pertinent Test Results:    Lab Results (most recent)       Procedure Component Value Units Date/Time    Basic Metabolic Panel [[id number]]  (Abnormal) Collected: 06/13/24 0343    Specimen: Blood Updated: 06/13/24 0412     Glucose 95 mg/dL      BUN 17 mg/dL      Creatinine 0.61 mg/dL      Sodium 137 mmol/L      Potassium 4.5 mmol/L      Chloride 101 mmol/L      CO2 30.1 mmol/L      Calcium 9.6 mg/dL      BUN/Creatinine Ratio 27.9     Anion Gap 5.9 mmol/L      eGFR 92.2 mL/min/1.73     Narrative:      GFR Normal >60  Chronic Kidney Disease <60  Kidney Failure <15    The GFR formula is only valid for adults with stable renal function between ages 18 and 70.    Tacrolimus Level [[id number]] Collected: 06/13/24 0343    Specimen: Blood Updated: 06/13/24 0348    Tacrolimus Level [[id number]] Collected: 06/09/24 0423    Specimen: Blood Updated: 06/12/24 1709     Tacrolimus 18.4 ng/mL      Comment:         Trough (immediately following                  transplant)                       15.0          Trough (steady state, 2 weeks or                  more after transplant):      3.0 - 8.0          Performed by LC-MS/MS technology.       Narrative:      Test(s) 700964-Tacrolimus (), Blood  was developed and its performance characteristics determined  by [facility]. It has not been cleared or approved by the Food  and Drug Administration.  Performed at:  [address]  [id number]  : Star Flores MD, Phone:  [phone]    Comprehensive Metabolic Panel [[id number]]  (Abnormal) Collected: 06/12/24 0556    Specimen: Blood Updated: 06/12/24 0633     Glucose 102 mg/dL      BUN 21 mg/dL      Creatinine 0.65 mg/dL      Sodium 138 mmol/L      Potassium 4.1 mmol/L      Chloride 103 mmol/L      CO2 29.9 mmol/L      Calcium 9.1 mg/dL      Total Protein 5.2 g/dL      Albumin 3.0 g/dL      ALT (SGPT) 8 U/L       AST (SGOT) 13 U/L      Alkaline Phosphatase 67 U/L      Total Bilirubin 0.3 mg/dL      Globulin 2.2 gm/dL      A/G Ratio 1.4 g/dL      BUN/Creatinine Ratio 32.3     Anion Gap 5.1 mmol/L      eGFR 90.8 mL/min/1.73     Narrative:      GFR Normal >60  Chronic Kidney Disease <60  Kidney Failure <15    The GFR formula is only valid for adults with stable renal function between ages 18 and 70.    CBC & Differential [[id number]]  (Abnormal) Collected: 06/12/24 0556    Specimen: Blood Updated: 06/12/24 0609    Narrative:      The following orders were created for panel order CBC & Differential.  Procedure                               Abnormality         Status                     ---------                               -----------         ------                     CBC Auto Differential[[id number]]        Abnormal            Final result                 Please view results for these tests on the individual orders.    CBC Auto Differential [[id number]]  (Abnormal) Collected: 06/12/24 0556    Specimen: Blood Updated: 06/12/24 0609     WBC 11.28 10*3/mm3      RBC 3.67 10*6/mm3      Hemoglobin 10.3 g/dL      Hematocrit 34.7 %      MCV 94.6 fL      MCH 28.1 pg      MCHC 29.7 g/dL      RDW 15.9 %      RDW-SD 56.7 fl      MPV 9.4 fL      Platelets 213 10*3/mm3      Neutrophil % 77.1 %      Lymphocyte % 6.2 %      Monocyte % 10.4 %      Eosinophil % 2.5 %      Basophil % 0.3 %      Immature Grans % 3.5 %      Neutrophils, Absolute 8.71 10*3/mm3      Lymphocytes, Absolute 0.70 10*3/mm3      Monocytes, Absolute 1.17 10*3/mm3      Eosinophils, Absolute 0.28 10*3/mm3      Basophils, Absolute 0.03 10*3/mm3      Immature Grans, Absolute 0.39 10*3/mm3      nRBC 0.0 /100 WBC     Comprehensive Metabolic Panel [[id number]]  (Abnormal) Collected: 06/11/24 0552    Specimen: Blood Updated: 06/11/24 0627     Glucose 98 mg/dL      BUN 27 mg/dL      Creatinine 0.78 mg/dL      Sodium 132 mmol/L      Potassium 4.1 mmol/L      Chloride 98 mmol/L       CO2 29.6 mmol/L      Calcium 9.8 mg/dL      Total Protein 5.7 g/dL      Albumin 3.1 g/dL      ALT (SGPT) 8 U/L      AST (SGOT) 15 U/L      Alkaline Phosphatase 72 U/L      Total Bilirubin 0.4 mg/dL      Globulin 2.6 gm/dL      A/G Ratio 1.2 g/dL      BUN/Creatinine Ratio 34.6     Anion Gap 4.4 mmol/L      eGFR 78.3 mL/min/1.73     Narrative:      GFR Normal >60  Chronic Kidney Disease <60  Kidney Failure <15    The GFR formula is only valid for adults with stable renal function between ages 18 and 70.    CBC & Differential [[id number]]  (Abnormal) Collected: 06/11/24 0552    Specimen: Blood Updated: 06/11/24 0601    Narrative:      The following orders were created for panel order CBC & Differential.  Procedure                               Abnormality         Status                     ---------                               -----------         ------                     CBC Auto Differential[[id number]]        Abnormal            Final result                 Please view results for these tests on the individual orders.    CBC Auto Differential [[id number]]  (Abnormal) Collected: 06/11/24 0552    Specimen: Blood Updated: 06/11/24 0601     WBC 11.85 10*3/mm3      RBC 3.78 10*6/mm3      Hemoglobin 10.8 g/dL      Hematocrit 35.2 %      MCV 93.1 fL      MCH 28.6 pg      MCHC 30.7 g/dL      RDW 15.9 %      RDW-SD 54.2 fl      MPV 9.3 fL      Platelets 207 10*3/mm3      Neutrophil % 77.9 %      Lymphocyte % 7.5 %      Monocyte % 7.8 %      Eosinophil % 2.8 %      Basophil % 0.4 %      Immature Grans % 3.6 %      Neutrophils, Absolute 9.23 10*3/mm3      Lymphocytes, Absolute 0.89 10*3/mm3      Monocytes, Absolute 0.92 10*3/mm3      Eosinophils, Absolute 0.33 10*3/mm3      Basophils, Absolute 0.05 10*3/mm3      Immature Grans, Absolute 0.43 10*3/mm3      nRBC 0.0 /100 WBC     Blood Culture - Blood, Arm, Left [[id number]]  (Normal) Collected: 06/05/24 1223    Specimen: Blood from Arm, Left Updated: 06/10/24 1246     Blood  Culture No growth at 5 days    Basic Metabolic Panel [[id number]]  (Abnormal) Collected: 06/10/24 0320    Specimen: Blood Updated: 06/10/24 0351     Glucose 106 mg/dL      BUN 37 mg/dL      Creatinine 0.97 mg/dL      Sodium 129 mmol/L      Potassium 4.0 mmol/L      Chloride 94 mmol/L      CO2 28.9 mmol/L      Calcium 9.4 mg/dL      BUN/Creatinine Ratio 38.1     Anion Gap 6.1 mmol/L      eGFR 60.3 mL/min/1.73     Narrative:      GFR Normal >60  Chronic Kidney Disease <60  Kidney Failure <15    The GFR formula is only valid for adults with stable renal function between ages 18 and 70.    Blood Culture - Blood, Arm, Left [[id number]]  (Abnormal)  (Susceptibility) Collected: 06/05/24 1223    Specimen: Blood from Arm, Left Updated: 06/09/24 0729     Blood Culture Escherichia coli ESBL     Comment:   Consider infectious disease consult.  Susceptibility results may not correlate to clinical outcomes.  For ESBL-producing infections in the blood, a carbapenem is recommended as first-line therapy for optimal clinical outcomes.        Isolated from Aerobic Bottle     Gram Stain Aerobic Bottle Gram negative bacilli    Narrative:      Less than seven (7) mL's of blood was collected.  Insufficient quantity may yield false negative results.    Susceptibility        Escherichia coli ESBL      ROBERT      Ertapenem Susceptible      Levofloxacin Resistant      Meropenem Susceptible      Trimethoprim + Sulfamethoxazole Susceptible                       Susceptibility Comments       Escherichia coli ESBL    Cefazolin sensitivity will not be reported for Enterobacteriaceae in non-urine isolates. If cefazolin is preferred, please call the microbiology lab to request an E-test.  With the exception of urinary-sourced infections, aminoglycosides should not be used as monotherapy.               Potassium [[id number]]  (Normal) Collected: 06/08/24 1354    Specimen: Blood Updated: 06/08/24 1534     Potassium 4.4 mmol/L      Comment: Specimen  hemolyzed.  Result may be falsely elevated.  Result checked       Phosphorus [[id number]]  (Normal) Collected: 06/08/24 1354    Specimen: Blood Updated: 06/08/24 1532     Phosphorus 2.5 mg/dL     Scan Slide [[id number]] Collected: 06/08/24 0352    Specimen: Blood Updated: 06/08/24 0426     Scan Slide --     Comment: See Manual Differential Results       Manual Differential [[id number]]  (Abnormal) Collected: 06/08/24 0352    Specimen: Blood Updated: 06/08/24 0426     Neutrophil % 88.0 %      Lymphocyte % 2.0 %      Monocyte % 2.0 %      Eosinophil % 1.0 %      Bands %  7.0 %      Neutrophils Absolute 14.94 10*3/mm3      Lymphocytes Absolute 0.31 10*3/mm3      Monocytes Absolute 0.31 10*3/mm3      Eosinophils Absolute 0.16 10*3/mm3      Anisocytosis Slight/1+     Elliptocytes Slight/1+     Poikilocytes Slight/1+     WBC Morphology Normal     Platelet Estimate Adequate    Magnesium [[id number]]  (Normal) Collected: 06/08/24 0352    Specimen: Blood Updated: 06/08/24 0414     Magnesium 2.0 mg/dL     Phosphorus [[id number]]  (Abnormal) Collected: 06/08/24 0352    Specimen: Blood Updated: 06/08/24 0413     Phosphorus 2.1 mg/dL     Potassium [[id number]]  (Normal) Collected: 06/07/24 1506    Specimen: Blood from Arm, Left Updated: 06/07/24 1557     Potassium 3.8 mmol/L      Comment: Specimen hemolyzed.  Result may be falsely elevated.       Urine Culture - Urine, Straight Cath [[id number]]  (Abnormal)  (Susceptibility) Collected: 06/05/24 1224    Specimen: Urine from Straight Cath Updated: 06/07/24 1039     Urine Culture >100,000 CFU/mL Escherichia coli ESBL     Comment:   Consider infectious disease consult.  Susceptibility results may not correlate to clinical outcomes.       Narrative:      Colonization of the urinary tract without infection is common. Treatment is discouraged unless the patient is symptomatic, pregnant, or undergoing an invasive urologic procedure.  Recent outcomes data supports the use of pip/tazo in the  treatment of susceptible ESBL infections for uncomplicated UTI. Consider use of pip/tazo as a carbapenem-sparing regimen in applicable patients.    Susceptibility        Escherichia coli ESBL      ROBERT      Ertapenem Susceptible      Gentamicin Susceptible      Levofloxacin Resistant      Meropenem Susceptible      Nitrofurantoin Susceptible      Piperacillin + Tazobactam Susceptible      Trimethoprim + Sulfamethoxazole Resistant                           CANDIDA AURIS PCR - Swab, Axilla Right, Axilla Left and Groin [[id number]]  (Normal) Collected: 06/06/24 0848    Specimen: Swab from Axilla Right, Axilla Left and Groin Updated: 06/07/24 0948     CANDIDA AURIS PCR Not Detected    Clostridioides difficile Toxin - Stool, Per Rectum [[id number]]  (Abnormal) Collected: 06/07/24 0438    Specimen: Stool from Per Rectum Updated: 06/07/24 0701    Narrative:      The following orders were created for panel order Clostridioides difficile Toxin - Stool, Per Rectum.  Procedure                               Abnormality         Status                     ---------                               -----------         ------                     Clostridioides difficile...[[id number]]  Abnormal            Final result                 Please view results for these tests on the individual orders.    Clostridioides difficile EIA - Stool, Per Rectum [[id number]]  (Abnormal) Collected: 06/07/24 0438    Specimen: Stool from Per Rectum Updated: 06/07/24 0701     C Diff GDH Ag Positive     C.diff Toxin Ag Positive    Narrative:      DNA from a toxigenic strain of C.difficile was detected, along with the presence of free toxin. These results are suggestive of C.difficile infection, in context of an appropriate clinical scenario.    Magnesium [[id number]]  (Normal) Collected: 06/07/24 0522    Specimen: Blood Updated: 06/07/24 0608     Magnesium 1.9 mg/dL     Blood Culture ID, PCR - Blood, Arm, Left [[id number]]  (Abnormal) Collected: 06/05/24 1223     Specimen: Blood from Arm, Left Updated: 06/07/24 0608     BCID, PCR Escherichia coli. Identification by BCID2 PCR.     BCID, PCR 2 CTX-M (ESBL) detected. Identification by BCID2 PCR     BOTTLE TYPE Aerobic Bottle    STAT Lactic Acid, Reflex [[id number]]  (Normal) Collected: 06/07/24 0522    Specimen: Blood Updated: 06/07/24 0605     Lactate 1.8 mmol/L     STAT Lactic Acid, Reflex [[id number]]  (Abnormal) Collected: 06/06/24 0847    Specimen: Blood Updated: 06/06/24 0927     Lactate 2.7 mmol/L     POC Glucose Once [[id number]]  (Abnormal) Collected: 06/05/24 2043    Specimen: Blood Updated: 06/05/24 2044     Glucose 267 mg/dL      Comment: Serial Number: [id number]:  [id number]       Gastrointestinal Panel, PCR - Stool, Per Rectum [[id number]]  (Normal) Collected: 06/05/24 1818    Specimen: Stool from Per Rectum Updated: 06/05/24 2034     Campylobacter Not Detected     Plesiomonas shigelloides Not Detected     Salmonella Not Detected     Vibrio Not Detected     Vibrio cholerae Not Detected     Yersinia enterocolitica Not Detected     Enteroaggregative E. coli (EAEC) Not Detected     Enteropathogenic E. coli (EPEC) Not Detected     Enterotoxigenic E. coli (ETEC) lt/st Not Detected     Shiga-like toxin-producing E. coli (STEC) stx1/stx2 Not Detected     Shigella/Enteroinvasive E. coli (EIEC) Not Detected     Cryptosporidium Not Detected     Cyclospora cayetanensis Not Detected     Entamoeba histolytica Not Detected     Giardia lamblia Not Detected     Adenovirus F40/41 Not Detected     Astrovirus Not Detected     Norovirus GI/GII Not Detected     Rotavirus A Not Detected     Sapovirus (I, II, IV or V) Not Detected    Narrative:      If Aeromonas, Staphylococcus aureus or Bacillus cereus are suspected, please order [id number]: Stool Culture, Aeromonas, S aureus, B Cereus.    Respiratory Panel PCR w/COVID-19(SARS-CoV-2) HODAN/MARIA M/TWILA/PAD/COR/JENISE In-House, NP Swab in UTM/VTM, 2 HR TAT - Swab, Nasopharynx [[id number]]   (Normal) Collected: 06/05/24 1224    Specimen: Swab from Nasopharynx Updated: 06/05/24 1326     ADENOVIRUS, PCR Not Detected     Coronavirus 229E Not Detected     Coronavirus HKU1 Not Detected     Coronavirus NL63 Not Detected     Coronavirus OC43 Not Detected     COVID19 Not Detected     Human Metapneumovirus Not Detected     Human Rhinovirus/Enterovirus Not Detected     Influenza A PCR Not Detected     Influenza B PCR Not Detected     Parainfluenza Virus 1 Not Detected     Parainfluenza Virus 2 Not Detected     Parainfluenza Virus 3 Not Detected     Parainfluenza Virus 4 Not Detected     RSV, PCR Not Detected     Bordetella pertussis pcr Not Detected     Bordetella parapertussis PCR Not Detected     Chlamydophila pneumoniae PCR Not Detected     Mycoplasma pneumo by PCR Not Detected    Narrative:      In the setting of a positive respiratory panel with a viral infection PLUS a negative procalcitonin without other underlying concern for bacterial infection, consider observing off antibiotics or discontinuation of antibiotics and continue supportive care. If the respiratory panel is positive for atypical bacterial infection (Bordetella pertussis, Chlamydophila pneumoniae, or Mycoplasma pneumoniae), consider antibiotic de-escalation to target atypical bacterial infection.    Procalcitonin [[id number]]  (Abnormal) Collected: 06/05/24 1223    Specimen: Blood Updated: 06/05/24 1305     Procalcitonin 2.39 ng/mL     Narrative:      As a Marker for Sepsis (Non-Neonates):    1. <0.5 ng/mL represents a low risk of severe sepsis and/or septic shock.  2. >2 ng/mL represents a high risk of severe sepsis and/or septic shock.    As a Marker for Lower Respiratory Tract Infections that require antibiotic therapy:    PCT on Admission    Antibiotic Therapy       6-12 Hrs later    >0.5                Strongly Recommended  >0.25 - <0.5        Recommended   0.1 - 0.25          Discouraged              Remeasure/reassess PCT  <0.1           "      Strongly Discouraged     Remeasure/reassess PCT    As 28 day mortality risk marker: \"Change in Procalcitonin Result\" (>80% or <=80%) if Day 0 (or Day 1) and Day 4 values are available. Refer to http://www.[facility]-pct-calculator.com    Change in PCT <=80%  A decrease of PCT levels below or equal to 80% defines a positive change in PCT test result representing a higher risk for 28-day all-cause mortality of patients diagnosed with severe sepsis for septic shock.    Change in PCT >80%  A decrease of PCT levels of more than 80% defines a negative change in PCT result representing a lower risk for 28-day all-cause mortality of patients diagnosed with severe sepsis or septic shock.       Urinalysis, Microscopic Only - Straight Cath [[id number]]  (Abnormal) Collected: 06/05/24 1224    Specimen: Urine from Straight Cath Updated: 06/05/24 1302     RBC, UA None Seen /HPF      WBC, UA Too Numerous to Count /HPF      Bacteria, UA 3+ /HPF      Squamous Epithelial Cells, UA None Seen /HPF      Hyaline Casts, UA None Seen /LPF      Methodology Manual Light Microscopy    Urinalysis With Culture If Indicated - Straight Cath [[id number]]  (Abnormal) Collected: 06/05/24 1224    Specimen: Urine from Straight Cath Updated: 06/05/24 1247     Color, UA Yellow     Appearance, UA Slightly Cloudy     pH, UA 5.5     Specific Gravity, UA 1.022     Glucose, UA >=1000 mg/dL (3+)     Ketones, UA Trace     Bilirubin, UA Negative     Blood, UA Trace     Protein, UA 30 mg/dL (1+)     Leuk Esterase, UA Moderate (2+)     Nitrite, UA Negative     Urobilinogen, UA 1.0 E.U./dL    Narrative:      In absence of clinical symptoms, the presence of pyuria, bacteria, and/or nitrites on the urinalysis result does not correlate with infection.    POC Lactate [[id number]]  (Abnormal) Collected: 06/05/24 1244    Specimen: Blood Updated: 06/05/24 1247     Lactate 3.2 mmol/L      Comment: Serial Number: [id number]:  [id number]       Extra Tubes [[id number]] " Collected: 06/05/24 1223    Specimen: Blood, Venous Line Updated: 06/05/24 1245    Narrative:      The following orders were created for panel order Extra Tubes.  Procedure                               Abnormality         Status                     ---------                               -----------         ------                     Gold Top - SST[[id number]]                                   Final result               Light Blue Top[[id number]]                                   Final result                 Please view results for these tests on the individual orders.    Gold Top - SST [[id number]] Collected: 06/05/24 1223    Specimen: Blood Updated: 06/05/24 1245     Extra Tube Hold for add-ons.     Comment: Auto resulted.       Light Blue Top [[id number]] Collected: 06/05/24 1223    Specimen: Blood Updated: 06/05/24 1245     Extra Tube Hold for add-ons.     Comment: Auto resulted                Results for orders placed during the hospital encounter of 05/17/24    Adult Transthoracic Echo Complete W/ Cont if Necessary Per Protocol    Interpretation Summary    Left ventricular systolic function is normal. Calculated left ventricular EF = 55% Left ventricular ejection fraction appears to be 51 - 55%.    Left ventricular diastolic function was indeterminate.    Left atrial volume is moderately increased.    The right atrial cavity is dilated.    Severe tricuspid valve regurgitation is present.    Estimated right ventricular systolic pressure from tricuspid regurgitation is markedly elevated (>55 mmHg).    Mild to moderate mitral valve regurgitation is present       Condition on Discharge:  Stable    Vital Signs  Temp:  [97.6 °F (36.4 °C)-98.1 °F (36.7 °C)] 98.1 °F (36.7 °C)  Heart Rate:  [] 85  Resp:  [13-21] 21  BP: (125-149)/(40-77) 125/40      Physical Exam  Vitals reviewed.   Constitutional:       Appearance: She is not ill-appearing.   HENT:      Head: Normocephalic and atraumatic.      Right Ear: External  ear normal.      Left Ear: External ear normal.      Nose: Nose normal.      Mouth/Throat:      Mouth: Mucous membranes are moist.   Eyes:      General:         Right eye: No discharge.         Left eye: No discharge.   Cardiovascular:      Rate and Rhythm: Normal rate and regular rhythm.      Pulses: Normal pulses.      Heart sounds: Normal heart sounds.   Pulmonary:      Effort: Pulmonary effort is normal.      Breath sounds: Normal breath sounds.   Abdominal:      General: Bowel sounds are normal.      Palpations: Abdomen is soft.   Musculoskeletal:         General: Normal range of motion.      Cervical back: Normal range of motion.   Skin:     General: Skin is warm.   Neurological:      Mental Status: She is alert and oriented to person, place, and time.   Psychiatric:         Behavior: Behavior normal.              Discharge Disposition      Discharge Medications     Discharge Medications        New Medications        Instructions Start Date   cholestyramine light 4 g packet   4 g, Oral, Every 12 Hours Scheduled      meropenem (MERREM) 1000 mg IVPB in 100 mL NS (MBP)   1,000 mg, Intravenous, Every 8 Hours      saccharomyces boulardii 250 MG capsule  Commonly known as: FLORASTOR   250 mg, Oral, 2 Times Daily      vancomycin 250 MG capsule  Commonly known as: VANCOCIN   250 mg, Oral, Every 6 Hours Scheduled             Changes to Medications        Instructions Start Date   dilTIAZem 30 MG tablet  Commonly known as: CARDIZEM  What changed:   medication strength  how much to take   30 mg, Oral, Every 8 Hours Scheduled      furosemide 40 MG tablet  Commonly known as: LASIX  What changed:   medication strength  how much to take  when to take this   40 mg, Oral, Daily   Start Date: June 14, 2024     tacrolimus 0.5 MG capsule  Commonly known as: PROGRAF  What changed:   medication strength  how much to take   0.5 mg, Oral, 2 Times Daily             Continue These Medications        Instructions Start Date   albuterol  sulfate  (90 Base) MCG/ACT inhaler  Commonly known as: PROVENTIL HFA;VENTOLIN HFA;PROAIR HFA   2 puffs, Inhalation, Every 6 Hours PRN      Aspercreme Arthritis Pain 1 % gel gel  Generic drug: Diclofenac Sodium   4 g, Topical, 2 Times Daily PRN      diazePAM 5 MG tablet  Commonly known as: VALIUM   5 mg, Oral, 2 Times Daily PRN      DRY EYE RELIEF OP   2 drops, Ophthalmic, 2 Times Daily PRN      DULoxetine HCl 40 MG capsule delayed-release particles   40 mg, Oral, Daily      Eliquis 5 MG tablet tablet  Generic drug: apixaban   Take 1 tablet by mouth Every 12 (Twelve) Hours.      ferrous sulfate 324 (65 Fe) MG tablet delayed-release EC tablet   324 mg, Oral, Daily With Breakfast      GUAIFENESIN 1200 PO   1,200 mg, Oral, Every 12 Hours PRN      levothyroxine 50 MCG tablet  Commonly known as: SYNTHROID, LEVOTHROID   50 mcg, Oral, Daily      loperamide 2 MG capsule  Commonly known as: IMODIUM   2 mg, Oral, 4 Times Daily PRN      metOLazone 5 MG tablet  Commonly known as: ZAROXOLYN   5 mg, Oral, 3 Times Weekly, Monday, Wednesday, Friday      metoprolol succinate XL 50 MG 24 hr tablet  Commonly known as: TOPROL-XL   50 mg, Oral, Daily      potassium chloride 20 MEQ CR tablet  Commonly known as: KLOR-CON M20   40 mEq, Oral, Daily      predniSONE 5 MG tablet  Commonly known as: DELTASONE   5 mg, Oral, Daily      Tylenol 325 MG tablet  Generic drug: acetaminophen   650 mg, Oral, Every 4 Hours PRN             Stop These Medications      colestipol 1 g tablet  Commonly known as: COLESTID     empagliflozin 25 MG tablet tablet  Commonly known as: JARDIANCE              Discharge Diet:   Diet Instructions       Diet: Cardiac Diets; Healthy Heart (2-3 Na+); Regular (IDDSI 7); Thin (IDDSI 0)      Discharge Diet: Cardiac Diets    Cardiac Diet: Healthy Heart (2-3 Na+)    Texture: Regular (IDDSI 7)    Fluid Consistency: Thin (IDDSI 0)            Activity at Discharge:   Activity Instructions       Gradually Increase Activity  Until at Pre-Hospitalization Level              Follow-up Appointments  No future appointments.  Additional Instructions for the Follow-ups that You Need to Schedule       Discharge Follow-up with PCP   As directed       Currently Documented PCP:    Jonathan Luna APRN    PCP Phone Number:    [phone]     Follow Up Details: after rehab                Test Results Pending at Discharge  Pending Labs       Order Current Status    Tacrolimus Level In process             TONYA Fuentes  06/13/24  12:18 EDT    Time: Discharge 25 min

## 2024-06-13 NOTE — PLAN OF CARE
Bed mobility - Min-A supine to/from sit.   Transfers - Mod-A sit to/from stand from standard height toilet and hospital bed.   Ambulation - 10 feet x2 bouts  Min-A and with rolling walker

## 2024-06-13 NOTE — THERAPY TREATMENT NOTE
"Subjective: Pt agreeable to therapeutic plan of care. Reports feeling nauseated    Objective:     Bed mobility - Min-A supine to/from sit.   Transfers - Mod-A sit to/from stand from standard height toilet and hospital bed.   Ambulation - 10 feet x2 bouts  Min-A and with rolling walker    Vitals: WNL    Pain: 0 no complaints.   Education: Provided education on the importance of mobility in the acute care setting, Verbal/Tactile Cues, Transfer Training, Gait Training, and Energy conservation strategies    Assessment: Jaja Carcamo presents with urgent bathroom needs which motivate her to increase gait distance today. Pt is progressing slowly toward goals. Continued strength, endurance and functional mobility impairments which indicate the need for skilled intervention. Tolerating session today without incident. Will continue to follow and progress as tolerated.     Plan/Recommendations:   If medically appropriate, Moderate Intensity Therapy recommended post-acute care. This is recommended as therapy feels the patient would require 3-4 days per week and wouldn't tolerate \"3 hour daily\" rehab intensity. SNF would be the preferred choice. If the patient does not agree to SNF, arrange HH or OP depending on home bound status. If patient is medically complex, consider LTACH. Pt requires no DME at discharge.     Pt desires Skilled Rehab placement at discharge. Pt cooperative; agreeable to therapeutic recommendations and plan of care.         Basic Mobility 6-click:  Rollin = Total, A lot = 2, A little = 3; 4 = None  Supine>Sit:   1 = Total, A lot = 2, A little = 3; 4 = None   Sit>Stand with arms:  1 = Total, A lot = 2, A little = 3; 4 = None  Bed>Chair:   1 = Total, A lot = 2, A little = 3; 4 = None  Ambulate in room:  1 = Total, A lot = 2, A little = 3; 4 = None  3-5 Steps with railin = Total, A lot = 2, A little = 3; 4 = None  Score: 14    Modified Flores: N/A = No pre-op stroke/TIA    Post-Tx Position: " Supine with HOB Elevated, Staff Present, Alarms activated, and Call light and personal items within reach  PPE: gloves and gown

## 2024-06-13 NOTE — PROGRESS NOTES
Infectious Diseases Progress Note      LOS: 7 days   Patient Care Team:  Jonathan Luna APRN as PCP - General (Family Medicine)  Jak Gavin MD as Cardiologist (Cardiology)    Chief Complaint: Diarrhea    Subjective     The had no fever during the last 24 hours.  Remained hemodynamically stable requiring no vasopressors.  Currently on 3 L of oxygen by nasal cannula.  The patient started to have partially formed stool.  Fecal management system was removed    Review of Systems:   Review of Systems   Constitutional: Negative.    HENT: Negative.     Eyes: Negative.    Respiratory: Negative.     Cardiovascular: Negative.    Gastrointestinal:  Positive for diarrhea.   Genitourinary: Negative.    Musculoskeletal: Negative.    Skin: Negative.    Neurological: Negative.    Hematological: Negative.    Psychiatric/Behavioral: Negative.          Objective     Vital Signs  Temp:  [97.6 °F (36.4 °C)-98.1 °F (36.7 °C)] 98.1 °F (36.7 °C)  Heart Rate:  [] 85  Resp:  [13-21] 21  BP: (125-149)/(40-77) 125/40    Physical Exam:  Physical Exam  Vitals and nursing note reviewed.   Constitutional:       Appearance: She is well-developed. She is ill-appearing.   HENT:      Head: Normocephalic and atraumatic.   Eyes:      Pupils: Pupils are equal, round, and reactive to light.   Cardiovascular:      Rate and Rhythm: Normal rate and regular rhythm.      Heart sounds: Normal heart sounds.   Pulmonary:      Effort: Pulmonary effort is normal. No respiratory distress.      Breath sounds: Normal breath sounds. No wheezing or rales.   Abdominal:      General: Bowel sounds are normal. There is no distension.      Palpations: Abdomen is soft. There is no mass.      Tenderness: There is abdominal tenderness. There is no guarding or rebound.   Musculoskeletal:         General: No deformity. Normal range of motion.      Cervical back: Normal range of motion and neck supple.   Skin:     General: Skin is warm.      Findings: No erythema or  rash.   Neurological:      Mental Status: She is alert and oriented to person, place, and time.      Cranial Nerves: No cranial nerve deficit.          Results Review:    I have reviewed all clinical data, test, lab, and imaging results.     Radiology  No Radiology Exams Resulted Within Past 24 Hours    Cardiology    Laboratory    Results from last 7 days   Lab Units 06/12/24  0556 06/11/24  0552 06/10/24  0320 06/09/24  0423 06/08/24  0352 06/07/24  0522   WBC 10*3/mm3 11.28* 11.85* 11.97* 11.95* 15.73* 14.63*   HEMOGLOBIN g/dL 10.3* 10.8* 10.6* 11.2* 10.8* 10.6*   HEMATOCRIT % 34.7 35.2 35.3 37.2 35.1 34.9   PLATELETS 10*3/mm3 213 207 219 201 205 199     Results from last 7 days   Lab Units 06/13/24  0343 06/12/24  0556 06/11/24  0552 06/10/24  0320 06/09/24  0423 06/08/24  1354 06/08/24  0352 06/07/24  1506 06/07/24  0522   SODIUM mmol/L 137 138 132* 129* 130*  --  132*  --  131*   POTASSIUM mmol/L 4.5 4.1 4.1 4.0 4.4 4.4 3.2*   < > 2.8*   CHLORIDE mmol/L 101 103 98 94* 95*  --  94*  --  92*   CO2 mmol/L 30.1* 29.9* 29.6* 28.9 28.3  --  27.3  --  28.8   BUN mg/dL 17 21 27* 37* 45*  --  48*  --  42*   CREATININE mg/dL 0.61 0.65 0.78 0.97 1.23*  --  1.18*  --  1.04*   GLUCOSE mg/dL 95 102* 98 106* 120*  --  121*  --  203*   ALBUMIN g/dL  --  3.0* 3.1*  --  3.2*  --  3.2*  --   --    BILIRUBIN mg/dL  --  0.3 0.4  --  0.4  --  0.5  --   --    ALK PHOS U/L  --  67 72  --  76  --  77  --   --    AST (SGOT) U/L  --  13 15  --  11  --  13  --   --    ALT (SGPT) U/L  --  8 8  --  7  --  8  --   --    CALCIUM mg/dL 9.6 9.1 9.8 9.4 9.5  --  9.3  --  9.4    < > = values in this interval not displayed.                 Microbiology   Microbiology Results (last 10 days)       Procedure Component Value - Date/Time    Clostridioides difficile Toxin - Stool, Per Rectum [[id number]]  (Abnormal) Collected: 06/07/24 9294    Lab Status: Final result Specimen: Stool from Per Rectum Updated: 06/07/24 0701    Narrative:      The following  orders were created for panel order Clostridioides difficile Toxin - Stool, Per Rectum.  Procedure                               Abnormality         Status                     ---------                               -----------         ------                     Clostridioides difficile...[[id number]]  Abnormal            Final result                 Please view results for these tests on the individual orders.    Clostridioides difficile EIA - Stool, Per Rectum [[id number]]  (Abnormal) Collected: 06/07/24 0438    Lab Status: Final result Specimen: Stool from Per Rectum Updated: 06/07/24 0701     C Diff GDH Ag Positive     C.diff Toxin Ag Positive    Narrative:      DNA from a toxigenic strain of C.difficile was detected, along with the presence of free toxin. These results are suggestive of C.difficile infection, in context of an appropriate clinical scenario.    CANDIDA AURIS PCR - Swab, Axilla Right, Axilla Left and Groin [[id number]]  (Normal) Collected: 06/06/24 0848    Lab Status: Final result Specimen: Swab from Axilla Right, Axilla Left and Groin Updated: 06/07/24 0948     CANDIDA AURIS PCR Not Detected    Gastrointestinal Panel, PCR - Stool, Per Rectum [[id number]]  (Normal) Collected: 06/05/24 1818    Lab Status: Final result Specimen: Stool from Per Rectum Updated: 06/05/24 2034     Campylobacter Not Detected     Plesiomonas shigelloides Not Detected     Salmonella Not Detected     Vibrio Not Detected     Vibrio cholerae Not Detected     Yersinia enterocolitica Not Detected     Enteroaggregative E. coli (EAEC) Not Detected     Enteropathogenic E. coli (EPEC) Not Detected     Enterotoxigenic E. coli (ETEC) lt/st Not Detected     Shiga-like toxin-producing E. coli (STEC) stx1/stx2 Not Detected     Shigella/Enteroinvasive E. coli (EIEC) Not Detected     Cryptosporidium Not Detected     Cyclospora cayetanensis Not Detected     Entamoeba histolytica Not Detected     Giardia lamblia Not Detected     Adenovirus  F40/41 Not Detected     Astrovirus Not Detected     Norovirus GI/GII Not Detected     Rotavirus A Not Detected     Sapovirus (I, II, IV or V) Not Detected    Narrative:      If Aeromonas, Staphylococcus aureus or Bacillus cereus are suspected, please order [id number]: Stool Culture, Aeromonas, S aureus, B Cereus.    Respiratory Panel PCR w/COVID-19(SARS-CoV-2) HODAN/MARIA M/TWILA/PAD/COR/JENISE In-House, NP Swab in UTM/VTM, 2 HR TAT - Swab, Nasopharynx [[id number]]  (Normal) Collected: 06/05/24 1224    Lab Status: Final result Specimen: Swab from Nasopharynx Updated: 06/05/24 1326     ADENOVIRUS, PCR Not Detected     Coronavirus 229E Not Detected     Coronavirus HKU1 Not Detected     Coronavirus NL63 Not Detected     Coronavirus OC43 Not Detected     COVID19 Not Detected     Human Metapneumovirus Not Detected     Human Rhinovirus/Enterovirus Not Detected     Influenza A PCR Not Detected     Influenza B PCR Not Detected     Parainfluenza Virus 1 Not Detected     Parainfluenza Virus 2 Not Detected     Parainfluenza Virus 3 Not Detected     Parainfluenza Virus 4 Not Detected     RSV, PCR Not Detected     Bordetella pertussis pcr Not Detected     Bordetella parapertussis PCR Not Detected     Chlamydophila pneumoniae PCR Not Detected     Mycoplasma pneumo by PCR Not Detected    Narrative:      In the setting of a positive respiratory panel with a viral infection PLUS a negative procalcitonin without other underlying concern for bacterial infection, consider observing off antibiotics or discontinuation of antibiotics and continue supportive care. If the respiratory panel is positive for atypical bacterial infection (Bordetella pertussis, Chlamydophila pneumoniae, or Mycoplasma pneumoniae), consider antibiotic de-escalation to target atypical bacterial infection.    Urine Culture - Urine, Straight Cath [[id number]]  (Abnormal)  (Susceptibility) Collected: 06/05/24 1224    Lab Status: Final result Specimen: Urine from Straight Cath  Updated: 06/07/24 1039     Urine Culture >100,000 CFU/mL Escherichia coli ESBL     Comment:   Consider infectious disease consult.  Susceptibility results may not correlate to clinical outcomes.       Narrative:      Colonization of the urinary tract without infection is common. Treatment is discouraged unless the patient is symptomatic, pregnant, or undergoing an invasive urologic procedure.  Recent outcomes data supports the use of pip/tazo in the treatment of susceptible ESBL infections for uncomplicated UTI. Consider use of pip/tazo as a carbapenem-sparing regimen in applicable patients.    Susceptibility        Escherichia coli ESBL      ROBERT      Ertapenem Susceptible      Gentamicin Susceptible      Levofloxacin Resistant      Meropenem Susceptible      Nitrofurantoin Susceptible      Piperacillin + Tazobactam Susceptible      Trimethoprim + Sulfamethoxazole Resistant                           Blood Culture - Blood, Arm, Left [[id number]]  (Normal) Collected: 06/05/24 1223    Lab Status: Final result Specimen: Blood from Arm, Left Updated: 06/10/24 1246     Blood Culture No growth at 5 days    Blood Culture - Blood, Arm, Left [[id number]]  (Abnormal)  (Susceptibility) Collected: 06/05/24 1223    Lab Status: Final result Specimen: Blood from Arm, Left Updated: 06/09/24 0729     Blood Culture Escherichia coli ESBL     Comment:   Consider infectious disease consult.  Susceptibility results may not correlate to clinical outcomes.  For ESBL-producing infections in the blood, a carbapenem is recommended as first-line therapy for optimal clinical outcomes.        Isolated from Aerobic Bottle     Gram Stain Aerobic Bottle Gram negative bacilli    Narrative:      Less than seven (7) mL's of blood was collected.  Insufficient quantity may yield false negative results.    Susceptibility        Escherichia coli ESBL      ROBERT      Ertapenem Susceptible      Levofloxacin Resistant      Meropenem Susceptible      Trimethoprim  + Sulfamethoxazole Susceptible                       Susceptibility Comments       Escherichia coli ESBL    Cefazolin sensitivity will not be reported for Enterobacteriaceae in non-urine isolates. If cefazolin is preferred, please call the microbiology lab to request an E-test.  With the exception of urinary-sourced infections, aminoglycosides should not be used as monotherapy.               Blood Culture ID, PCR - Blood, Arm, Left [[id number]]  (Abnormal) Collected: 06/05/24 1223    Lab Status: Final result Specimen: Blood from Arm, Left Updated: 06/07/24 0608     BCID, PCR Escherichia coli. Identification by BCID2 PCR.     BCID, PCR 2 CTX-M (ESBL) detected. Identification by BCID2 PCR     BOTTLE TYPE Aerobic Bottle            Medication Review:       Schedule Meds  apixaban, 5 mg, Oral, Q12H  cholestyramine light, 1 packet, Oral, Q12H  diazePAM, 10 mg, Oral, Nightly  dilTIAZem, 30 mg, Oral, Q8H  DULoxetine, 40 mg, Oral, Daily  famotidine, 20 mg, Oral, Daily  ferrous sulfate, 324 mg, Oral, Daily With Breakfast  furosemide, 40 mg, Oral, Daily  levothyroxine, 50 mcg, Oral, Q AM  lidocaine, 20 mL, Intradermal, Once  meropenem, 1,000 mg, Intravenous, Q8H  metoprolol succinate XL, 50 mg, Oral, Daily  predniSONE, 5 mg, Oral, Daily  saccharomyces boulardii, 250 mg, Oral, BID  sodium chloride, 10 mL, Intravenous, Q12H  sodium chloride, 10 mL, Intravenous, Q12H  tacrolimus, 0.5 mg, Oral, BID  vancomycin, 250 mg, Oral, Q6H        Infusion Meds  Pharmacy Consult,         PRN Meds    acetaminophen    Calcium Replacement - Follow Nurse / BPA Driven Protocol    carboxymethylcellulose sod    diazePAM    diphenhydrAMINE    HYDROcodone-acetaminophen    loperamide    Magnesium Standard Dose Replacement - Follow Nurse / BPA Driven Protocol    melatonin    ondansetron    Pharmacy Consult    Phosphorus Replacement - Follow Nurse / BPA Driven Protocol    Potassium Replacement - Follow Nurse / BPA Driven Protocol    [COMPLETED] Insert  Peripheral IV **AND** sodium chloride    sodium chloride    sodium chloride    sodium chloride    sodium chloride        Assessment & Plan       Antimicrobial Therapy   1.  IV meropenem        2.  P.o. vancomycin        3.        4.        5.          Assessment     ESBL  E. coli bacteremia secondary to complicated UTI.  CT scan of abdomen pelvis showed no acute findings.  T     Complicated UTI with bacteremia.  Urine culture grew ESBL  E. coli.  Patient had recent episode of E. coli/ESBL  UTI and treated with p.o. fosfomycin     C. difficile colitis.  According to patient this is her second episode and she had 1 episode in 2013.  Currently has a fecal management system diarrhea is slowing down     Reactive leukocytosis secondary to above and steroids.  White count remained stable.  Trending down     History of COPD chronically on 4 L of oxygen via nasal cannula.  Currently on 3 L     History of kidney transplant in 2017 secondary to granulomatosis polyangiitis.  Currently on prednisone and Prograf     History of lung cancer in the past     Plan     Continue IV meropenem 1 g every 12 hours for total of 14 days-last day on June 20, 2024  Continue p.o. vancomycin  250 mg every 6 hours.  We will consider 3 weeks of treatment with p.o. vancomycin.  Last day of p.o. vancomycin will be June 27, 2024  Continue probiotics    Enteric isolation for C. difficile colitis  Contact isolation for ESBL  E. Coli  Appropriate PPE was placed on before entering the room  Case was discussed with the patient's son at bedside  Okay to discharge to rehab facility at any time.  Patient will need a midline before discharge and recommend to remove midline after last dose of IV meropenem on June 20, 2024         Dirk Robin MD  06/13/24  13:00 EDT    Note is dictated utilizing voice recognition software/Dragon

## 2024-06-13 NOTE — NURSING NOTE
Call placed to dr Falcon about placement of picc line. He return called and stated it was ok to place picc on patient for her antibiotics.

## 2024-06-16 LAB — TACROLIMUS BLD LC/MS/MS-MCNC: 8.3 NG/ML (ref 2–20)

## 2024-07-09 ENCOUNTER — HOME HEALTH ADMISSION (OUTPATIENT)
Dept: HOME HEALTH SERVICES | Facility: HOME HEALTHCARE | Age: 79
End: 2024-07-09
Payer: MEDICARE

## 2024-07-09 ENCOUNTER — TRANSCRIBE ORDERS (OUTPATIENT)
Dept: HOME HEALTH SERVICES | Facility: HOME HEALTHCARE | Age: 79
End: 2024-07-09
Payer: MEDICARE

## 2024-07-09 ENCOUNTER — DOCUMENTATION (OUTPATIENT)
Dept: HOME HEALTH SERVICES | Facility: HOME HEALTHCARE | Age: 79
End: 2024-07-09
Payer: MEDICARE

## 2024-07-09 DIAGNOSIS — I50.33 ACUTE ON CHRONIC DIASTOLIC HEART FAILURE: Primary | ICD-10-CM

## 2024-07-09 DIAGNOSIS — J96.11 CHRONIC HYPOXEMIC RESPIRATORY FAILURE: ICD-10-CM

## 2024-07-09 NOTE — PROGRESS NOTES
Facility Discharge  Jaja Carcamo - 1945  DC 7/5 from Marielle Bhatia RN SOC 7/11    Jonathan Luna NP is the PCP/POC/Attending provider and agrees to follow the HH/POC per Claribel posada to follow on 7/3/24.     Dr. Baldev Conway is the ordering provider: RN to admit on 7/11    Dr. Baldev Conway performed the F2F on 6/4/24    DX: acute on chronic diastolic CHF, chronic hypoxic respiratory failure, SERD, S/P RENAL TRANSPLANT, anemia, CKD, immunocompromised state, COPD, pan lobular emphysema, GERD, acquired hypothyroidism, HTN, peripheral neuropathy, RAF.     I met with the patient on 7/3 and spoke with son on 7/9 and they are agreeable to home health and do not have any other skilled services in the home at this time.    -Family - requesting  to contact Davy reynolds regarding [facility] type resources, personal care, etc.     -Family requesting that we strongly encourage patient to agree to PT due to weakness.     Address confirmed:  [facility] [place] [address]    Contact:  #1 (Son - Davy) [phone]  #2 Patient home - [phone]

## 2024-07-11 ENCOUNTER — HOME CARE VISIT (OUTPATIENT)
Dept: HOME HEALTH SERVICES | Facility: HOME HEALTHCARE | Age: 79
End: 2024-07-11
Payer: MEDICARE

## 2024-07-11 PROCEDURE — G0299 HHS/HOSPICE OF RN EA 15 MIN: HCPCS

## 2024-07-11 NOTE — Clinical Note
"SOC Note: pt. evaluated today for weakness, falls, pt. was discharged from [facility] last week.  Pt. normally lives alone in 1 story home, but is now requiring someone to stay with her all of the time d/t extreme weakness.  Pt. used to be able to care for herself, but now requires almost full assistance with ADL's.  Pt. has multiple co-morbidities, including hx.of skin cancer, nephrectomy, hypothyroidism, CHF, COPD    Focus of Care: Acute on chronic diastolic (congestive) heart failure       Patient's goal(s): \" To get stronger    Current Functional status/mobility/DME: uses wheelchair, needs total assistance    HB status/Living Arrangements: son is temporarily staying with her    Skin Integrity/wound status: at risk    Code Status: Full code    Fall Risk/Safety concerns: high risk of falls    Educated on Emergency Plan, steps to take prior to going to the ER and when to Call [facility]:  pt's son and pt. aware     Medication issues/Concerns: compliance    Additional Problems/Concerns: pt.'s willingness to participate    SDOH Barriers (i.e. caregiver concerns, social isolation, transportation, food insecurity, environment, income etc.)/Need for MSW: Yes    SN1w8  "

## 2024-07-12 ENCOUNTER — HOME CARE VISIT (OUTPATIENT)
Dept: HOME HEALTH SERVICES | Facility: HOME HEALTHCARE | Age: 79
End: 2024-07-12
Payer: MEDICARE

## 2024-07-12 VITALS
SYSTOLIC BLOOD PRESSURE: 104 MMHG | HEART RATE: 71 BPM | OXYGEN SATURATION: 91 % | DIASTOLIC BLOOD PRESSURE: 50 MMHG | TEMPERATURE: 97.3 F

## 2024-07-12 VITALS
TEMPERATURE: 97 F | DIASTOLIC BLOOD PRESSURE: 70 MMHG | SYSTOLIC BLOOD PRESSURE: 106 MMHG | OXYGEN SATURATION: 94 % | HEART RATE: 90 BPM | RESPIRATION RATE: 18 BRPM

## 2024-07-12 PROCEDURE — G0151 HHCP-SERV OF PT,EA 15 MIN: HCPCS

## 2024-07-12 NOTE — HOME HEALTH
"SOC Note: pt. evaluated today for weakness, falls, pt. was discharged from [facility] last week.  Pt. normally lives alone in 1 story home, but is now requiring someone to stay with her all of the time d/t extreme weakness.  Pt. used to be able to care for herself, but now requires almost full assistance with ADL's.  Pt. has multiple co-morbidities, including hx.of skin cancer, nephrectomy, hypothyroidism, CHF, COPD    Focus of Care: Acute on chronic diastolic (congestive) heart failure       Patient's goal(s): \" To get stronger    Current Functional status/mobility/DME: uses wheelchair, needs total assistance    HB status/Living Arrangements: son is temporarily staying with her    Skin Integrity/wound status: at risk    Code Status: Full code    Fall Risk/Safety concerns: high risk of falls    Educated on Emergency Plan, steps to take prior to going to the ER and when to Call [facility]:  pt's son and pt. aware     Medication issues/Concerns: compliance    Additional Problems/Concerns: pt.'s willingness to participate    SDOH Barriers (i.e. caregiver concerns, social isolation, transportation, food insecurity, environment, income etc.)/Need for MSW: Yes    SN1w8"

## 2024-07-13 ENCOUNTER — HOME CARE VISIT (OUTPATIENT)
Dept: HOME HEALTH SERVICES | Facility: HOME HEALTHCARE | Age: 79
End: 2024-07-13
Payer: MEDICARE

## 2024-07-15 ENCOUNTER — HOME CARE VISIT (OUTPATIENT)
Dept: HOME HEALTH SERVICES | Facility: HOME HEALTHCARE | Age: 79
End: 2024-07-15
Payer: MEDICARE

## 2024-07-15 VITALS
OXYGEN SATURATION: 98 % | SYSTOLIC BLOOD PRESSURE: 110 MMHG | DIASTOLIC BLOOD PRESSURE: 60 MMHG | HEART RATE: 83 BPM | TEMPERATURE: 97.8 F

## 2024-07-15 PROCEDURE — G0152 HHCP-SERV OF OT,EA 15 MIN: HCPCS

## 2024-07-16 ENCOUNTER — HOME CARE VISIT (OUTPATIENT)
Dept: HOME HEALTH SERVICES | Facility: HOME HEALTHCARE | Age: 79
End: 2024-07-16
Payer: MEDICARE

## 2024-07-16 VITALS
OXYGEN SATURATION: 97 % | TEMPERATURE: 97 F | RESPIRATION RATE: 18 BRPM | DIASTOLIC BLOOD PRESSURE: 68 MMHG | HEART RATE: 100 BPM | SYSTOLIC BLOOD PRESSURE: 108 MMHG

## 2024-07-16 PROCEDURE — G0156 HHCP-SVS OF AIDE,EA 15 MIN: HCPCS

## 2024-07-16 PROCEDURE — G0299 HHS/HOSPICE OF RN EA 15 MIN: HCPCS

## 2024-07-17 ENCOUNTER — HOME CARE VISIT (OUTPATIENT)
Dept: HOME HEALTH SERVICES | Facility: HOME HEALTHCARE | Age: 79
End: 2024-07-17
Payer: MEDICARE

## 2024-07-17 VITALS
HEART RATE: 83 BPM | TEMPERATURE: 97.5 F | OXYGEN SATURATION: 99 % | DIASTOLIC BLOOD PRESSURE: 60 MMHG | SYSTOLIC BLOOD PRESSURE: 106 MMHG

## 2024-07-17 PROCEDURE — G0151 HHCP-SERV OF PT,EA 15 MIN: HCPCS

## 2024-07-17 NOTE — HOME HEALTH
Pt is a 80 yo female who lives in a 1 story home alone.   Pt recently hospitalized and then to IP rehab secondary to       PLOF pt independent with feeding grooming dressing and toileting.  Pt independent with sponge bathing and light meal prep.      Currently pt independent with feeding.  Pt requires MOD assist with bathing dressing and toileting.   Max assist with all IADLS      Skilled OT for ther ex transfers and adl training.   Pt and therapist in agreement with goals and poc.      Next visit ther ex/HEP      Pt denies any falls or med changes

## 2024-07-19 ENCOUNTER — HOME CARE VISIT (OUTPATIENT)
Dept: HOME HEALTH SERVICES | Facility: HOME HEALTHCARE | Age: 79
End: 2024-07-19
Payer: MEDICARE

## 2024-07-19 VITALS
OXYGEN SATURATION: 94 % | TEMPERATURE: 98 F | DIASTOLIC BLOOD PRESSURE: 60 MMHG | RESPIRATION RATE: 18 BRPM | HEART RATE: 91 BPM | SYSTOLIC BLOOD PRESSURE: 118 MMHG

## 2024-07-19 VITALS
HEART RATE: 97 BPM | OXYGEN SATURATION: 95 % | DIASTOLIC BLOOD PRESSURE: 70 MMHG | TEMPERATURE: 97.5 F | SYSTOLIC BLOOD PRESSURE: 100 MMHG

## 2024-07-19 PROCEDURE — G0155 HHCP-SVS OF CSW,EA 15 MIN: HCPCS

## 2024-07-19 PROCEDURE — G0151 HHCP-SERV OF PT,EA 15 MIN: HCPCS

## 2024-07-19 PROCEDURE — G0152 HHCP-SERV OF OT,EA 15 MIN: HCPCS

## 2024-07-19 PROCEDURE — G0299 HHS/HOSPICE OF RN EA 15 MIN: HCPCS

## 2024-07-19 NOTE — HOME HEALTH
"Face to face contact with Jaja Carcamo, a 78 y/o WWF from [place] with presenting problems of hypertensive heart disease, chronic kidney disease, COPD, dyspnea with exertion, hx of lung CA, and [id number]. Per chart records, pt is on duloxetine for depression. However, pt reports that she is no longer on duloxetine. She reports that \"my son quit giving it to me.\" She has a paid caregiver who sits with her several hours per week. Davy is her only living child. She had a daughter who passed away. Davy does her grocery shopping and picks up her medicine. Pt's  passed away four years ago. Patient has [facility] Medicare Replacement. She reports that her son is thinking about transitioning her traditional Medicare. THI assisted patient with accessing community resources. THI explained that in order to qualify for \"gree\" services she would have to qualify for [place] Medicaid. She states that she does not qualify for Medicaid and does not want to apply for Medicaid. THI told her that any serviices such as MOW, medical tranportation, or sitter services require an out-of-pocket fee unless one qualifies for [place] Medicaid. Patient voiced understanding. THI got patient's permission to contact her adult son by telephone to discuss patient's Medicaid situation to see if he would like to apply her for [place] Medicaid. SW will f/u by phone with the son."

## 2024-07-21 VITALS
HEART RATE: 76 BPM | OXYGEN SATURATION: 94 % | TEMPERATURE: 98 F | DIASTOLIC BLOOD PRESSURE: 68 MMHG | SYSTOLIC BLOOD PRESSURE: 112 MMHG

## 2024-07-22 ENCOUNTER — HOME CARE VISIT (OUTPATIENT)
Dept: HOME HEALTH SERVICES | Facility: HOME HEALTHCARE | Age: 79
End: 2024-07-22
Payer: MEDICARE

## 2024-07-22 VITALS
SYSTOLIC BLOOD PRESSURE: 122 MMHG | DIASTOLIC BLOOD PRESSURE: 64 MMHG | HEART RATE: 68 BPM | OXYGEN SATURATION: 99 % | TEMPERATURE: 97.6 F

## 2024-07-22 PROCEDURE — G0151 HHCP-SERV OF PT,EA 15 MIN: HCPCS

## 2024-07-23 ENCOUNTER — HOME CARE VISIT (OUTPATIENT)
Dept: HOME HEALTH SERVICES | Facility: HOME HEALTHCARE | Age: 79
End: 2024-07-23
Payer: MEDICARE

## 2024-07-23 VITALS
DIASTOLIC BLOOD PRESSURE: 70 MMHG | HEART RATE: 84 BPM | RESPIRATION RATE: 18 BRPM | OXYGEN SATURATION: 92 % | SYSTOLIC BLOOD PRESSURE: 108 MMHG | TEMPERATURE: 97 F

## 2024-07-23 PROCEDURE — G0299 HHS/HOSPICE OF RN EA 15 MIN: HCPCS

## 2024-07-24 ENCOUNTER — HOME CARE VISIT (OUTPATIENT)
Dept: HOME HEALTH SERVICES | Facility: HOME HEALTHCARE | Age: 79
End: 2024-07-24
Payer: MEDICARE

## 2024-07-24 PROCEDURE — G0151 HHCP-SERV OF PT,EA 15 MIN: HCPCS

## 2024-07-25 ENCOUNTER — HOME CARE VISIT (OUTPATIENT)
Dept: HOME HEALTH SERVICES | Facility: HOME HEALTHCARE | Age: 79
End: 2024-07-25
Payer: MEDICARE

## 2024-07-25 VITALS
OXYGEN SATURATION: 91 % | DIASTOLIC BLOOD PRESSURE: 70 MMHG | TEMPERATURE: 97.6 F | HEART RATE: 85 BPM | SYSTOLIC BLOOD PRESSURE: 108 MMHG

## 2024-07-25 PROCEDURE — G0152 HHCP-SERV OF OT,EA 15 MIN: HCPCS

## 2024-07-26 ENCOUNTER — HOME CARE VISIT (OUTPATIENT)
Dept: HOME HEALTH SERVICES | Facility: HOME HEALTHCARE | Age: 79
End: 2024-07-26
Payer: MEDICARE

## 2024-07-26 VITALS
TEMPERATURE: 97.8 F | RESPIRATION RATE: 17 BRPM | OXYGEN SATURATION: 97 % | DIASTOLIC BLOOD PRESSURE: 68 MMHG | SYSTOLIC BLOOD PRESSURE: 124 MMHG

## 2024-07-26 PROCEDURE — G0157 HHC PT ASSISTANT EA 15: HCPCS

## 2024-07-26 NOTE — HOME HEALTH
pt sitting up and prepared for PT session with o2 donned and states she feels ok for PT session.      pt reluctant to partcipate due to weakness and low endurance but is willing to participate.         plan for next session-  cont PT with gait trg when able,   hep review,  transfer trg and balance trg all with advancement as

## 2024-07-29 ENCOUNTER — HOME CARE VISIT (OUTPATIENT)
Dept: HOME HEALTH SERVICES | Facility: HOME HEALTHCARE | Age: 79
End: 2024-07-29
Payer: MEDICARE

## 2024-07-29 VITALS
HEART RATE: 90 BPM | OXYGEN SATURATION: 90 % | RESPIRATION RATE: 15 BRPM | TEMPERATURE: 97.9 F | DIASTOLIC BLOOD PRESSURE: 68 MMHG | SYSTOLIC BLOOD PRESSURE: 140 MMHG

## 2024-07-29 PROCEDURE — G0157 HHC PT ASSISTANT EA 15: HCPCS

## 2024-07-30 ENCOUNTER — HOME CARE VISIT (OUTPATIENT)
Dept: HOME HEALTH SERVICES | Facility: HOME HEALTHCARE | Age: 79
End: 2024-07-30
Payer: MEDICARE

## 2024-07-30 VITALS
DIASTOLIC BLOOD PRESSURE: 56 MMHG | TEMPERATURE: 97.7 F | OXYGEN SATURATION: 99 % | RESPIRATION RATE: 18 BRPM | HEART RATE: 92 BPM | SYSTOLIC BLOOD PRESSURE: 102 MMHG

## 2024-07-30 PROCEDURE — G0299 HHS/HOSPICE OF RN EA 15 MIN: HCPCS

## 2024-07-30 NOTE — HOME HEALTH
pt in the bathroon on arrival,  feeling fair and is agreeable to PT but reluctant and needing encouragement.       no med changes or complications per son.        pt with o2 donned,  son present and assisting as needed.    pt was challenged today to stand for extended periods of time and needed cues to properly extend the knees.    pt was challenged today to stand for extended periods of time and often requested sitting.   son was present and was educated on the activities performed.    pt was minimally unsteady upon standing and required instruction to transfer properly to standing.     pt was encouraged to stand each 2 hours and to review hep x 2 daily     plan for next session- cont PT with pre's,   gait trg when able,     balance trg,   transfer trg

## 2024-07-31 ENCOUNTER — HOME CARE VISIT (OUTPATIENT)
Dept: HOME HEALTH SERVICES | Facility: HOME HEALTHCARE | Age: 79
End: 2024-07-31
Payer: MEDICARE

## 2024-07-31 PROCEDURE — G0157 HHC PT ASSISTANT EA 15: HCPCS

## 2024-08-01 ENCOUNTER — HOME CARE VISIT (OUTPATIENT)
Dept: HOME HEALTH SERVICES | Facility: HOME HEALTHCARE | Age: 79
End: 2024-08-01
Payer: MEDICARE

## 2024-08-01 PROCEDURE — G0152 HHCP-SERV OF OT,EA 15 MIN: HCPCS

## 2024-08-01 NOTE — HOME HEALTH
"pt sitting in the bathroom on arrival,   not prepared for PT stating she  \"forgot PT was coming\"     pt is motivated to improve and return to plf       pt was limited today in all areas with weakness and low endurance,  needed cues to remain on task and to perform the activities correctly and to avoid compensatory movement.     pt was challenged to stand,  needed cues to properly flex the knees  and for proper ue placement.      pt was minimally unsteady upon standing and needed cues to properly position the LEs   pt was challenged today to stand for extended periods of time,  often requested sitting.           plan for next session- cont PT with PRE's,  gait trg,  transfer trg,  balance trg and wb activities"

## 2024-08-02 ENCOUNTER — HOME CARE VISIT (OUTPATIENT)
Dept: HOME HEALTH SERVICES | Facility: HOME HEALTHCARE | Age: 79
End: 2024-08-02
Payer: MEDICARE

## 2024-08-02 VITALS
RESPIRATION RATE: 16 BRPM | TEMPERATURE: 97.8 F | DIASTOLIC BLOOD PRESSURE: 66 MMHG | HEART RATE: 105 BPM | OXYGEN SATURATION: 90 % | SYSTOLIC BLOOD PRESSURE: 130 MMHG

## 2024-08-02 VITALS
TEMPERATURE: 97.5 F | OXYGEN SATURATION: 96 % | HEART RATE: 100 BPM | DIASTOLIC BLOOD PRESSURE: 60 MMHG | SYSTOLIC BLOOD PRESSURE: 130 MMHG

## 2024-08-02 PROCEDURE — G0157 HHC PT ASSISTANT EA 15: HCPCS

## 2024-08-02 NOTE — HOME HEALTH
pt in the bathroom upon arrival,    o2 donned and cg assisting.    pt had earlier am meds and reports no changes.      pt reporting fatigue but not pain.       pt denies any falls and continues to use her wc for indoor mobility.        pt was limited today in all areas with weakness and limited endurance but continues show slow gains in the ability to stand and ambulate and with close assistance needed.   pt was educated on proper form with hep review,  with cues needed to fully contract/hold and to perform in the correct plane    pt was educated throughout on proper deep plb for energy conservation.     cg was educated on the activities performed     plan for next session- cont PT with gait trg, hep review, balance trg, activities to improve endurance

## 2024-08-05 ENCOUNTER — HOME CARE VISIT (OUTPATIENT)
Dept: HOME HEALTH SERVICES | Facility: HOME HEALTHCARE | Age: 79
End: 2024-08-05
Payer: MEDICARE

## 2024-08-05 PROCEDURE — G0157 HHC PT ASSISTANT EA 15: HCPCS

## 2024-08-05 NOTE — HOME HEALTH
pt sitting up and is ok for PT session.      pt with her o2 donned.     pt states she is motivated to improve and return to plf .   pt states she has not yet had am meds and will take when her son returns and she is able eat.          pt requiring assistance to transfer up from the chair with cues given for proper form.     pt continues to require instruction to perform ther ex correctly and within the correct plane and to properly deep plb for energy conservation.    pts gait tolerance was lessened in relation to last session, with request to sit due to fatigue.    pt was educated today on proper lumbar stab techniques when standing and needed cues to perform within the correct plane with proper form         plan for next session- cont PT with gait trg,  transfer trg and balance trg all with advancement as able

## 2024-08-06 ENCOUNTER — HOME CARE VISIT (OUTPATIENT)
Dept: HOME HEALTH SERVICES | Facility: HOME HEALTHCARE | Age: 79
End: 2024-08-06
Payer: MEDICARE

## 2024-08-06 PROCEDURE — G0299 HHS/HOSPICE OF RN EA 15 MIN: HCPCS

## 2024-08-08 ENCOUNTER — HOME CARE VISIT (OUTPATIENT)
Dept: HOME HEALTH SERVICES | Facility: HOME HEALTHCARE | Age: 79
End: 2024-08-08
Payer: MEDICARE

## 2024-08-08 VITALS
OXYGEN SATURATION: 95 % | SYSTOLIC BLOOD PRESSURE: 112 MMHG | TEMPERATURE: 97.5 F | DIASTOLIC BLOOD PRESSURE: 64 MMHG | HEART RATE: 88 BPM

## 2024-08-08 PROCEDURE — G0152 HHCP-SERV OF OT,EA 15 MIN: HCPCS

## 2024-08-08 PROCEDURE — G0151 HHCP-SERV OF PT,EA 15 MIN: HCPCS

## 2024-08-09 ENCOUNTER — HOME CARE VISIT (OUTPATIENT)
Dept: HOME HEALTH SERVICES | Facility: HOME HEALTHCARE | Age: 79
End: 2024-08-09
Payer: MEDICARE

## 2024-08-09 VITALS
DIASTOLIC BLOOD PRESSURE: 68 MMHG | SYSTOLIC BLOOD PRESSURE: 126 MMHG | HEART RATE: 84 BPM | RESPIRATION RATE: 15 BRPM | TEMPERATURE: 97.7 F | OXYGEN SATURATION: 96 %

## 2024-08-09 VITALS
TEMPERATURE: 98 F | DIASTOLIC BLOOD PRESSURE: 70 MMHG | RESPIRATION RATE: 18 BRPM | SYSTOLIC BLOOD PRESSURE: 118 MMHG | HEART RATE: 80 BPM | OXYGEN SATURATION: 92 %

## 2024-08-09 PROCEDURE — G0157 HHC PT ASSISTANT EA 15: HCPCS

## 2024-08-09 NOTE — HOME HEALTH
pt up in her chair with LEs elevated.  pt states she feels fair today but with limited strength and endurance.  pt is attempting to perform PT hep as able.     pt reporting no med changes and no falls.   cg present     pt continues to have significant weaknees and overall functional limitations.  pt was challenged today to stand for extended periods of time  and needed cues to stand upright with proper posture and to contract quads.  pt was educated today on proper deep plb,  o2 sats remain mostly low at 86-90 througout on supplemental o2.       plan for next session-  cont with pre's,  gait trg,  standing activities as pt can tolerate

## 2024-08-12 ENCOUNTER — HOME CARE VISIT (OUTPATIENT)
Dept: HOME HEALTH SERVICES | Facility: HOME HEALTHCARE | Age: 79
End: 2024-08-12
Payer: MEDICARE

## 2024-08-12 VITALS
SYSTOLIC BLOOD PRESSURE: 136 MMHG | TEMPERATURE: 97.9 F | OXYGEN SATURATION: 89 % | RESPIRATION RATE: 18 BRPM | DIASTOLIC BLOOD PRESSURE: 70 MMHG | HEART RATE: 106 BPM

## 2024-08-12 PROCEDURE — G0157 HHC PT ASSISTANT EA 15: HCPCS

## 2024-08-13 ENCOUNTER — HOME CARE VISIT (OUTPATIENT)
Dept: HOME HEALTH SERVICES | Facility: HOME HEALTHCARE | Age: 79
End: 2024-08-13
Payer: MEDICARE

## 2024-08-13 VITALS
TEMPERATURE: 97.8 F | OXYGEN SATURATION: 97 % | SYSTOLIC BLOOD PRESSURE: 124 MMHG | DIASTOLIC BLOOD PRESSURE: 80 MMHG | HEART RATE: 100 BPM

## 2024-08-13 VITALS
TEMPERATURE: 96.9 F | HEART RATE: 78 BPM | SYSTOLIC BLOOD PRESSURE: 100 MMHG | RESPIRATION RATE: 18 BRPM | OXYGEN SATURATION: 93 % | DIASTOLIC BLOOD PRESSURE: 66 MMHG

## 2024-08-13 PROCEDURE — G0152 HHCP-SERV OF OT,EA 15 MIN: HCPCS

## 2024-08-13 PROCEDURE — G0299 HHS/HOSPICE OF RN EA 15 MIN: HCPCS

## 2024-08-13 NOTE — HOME HEALTH
pt sitting up and prepared for PT session,  o2 donned, pt had earlier am meds.   pt reporting she has been attempting hep review with son assistance.     pt agrees to PT but with reluctancy.       pt continues to be very limited in all areas, with weakness and limited standing tolerance,  eventually fatigued and needed to sit.  pt was challenged today to stand with noted quad weakness and an overall work/rest ratio of 50/50.        pt was educated today on proper form with hep review,    with cues required to properly advance the LEs with correct form and to deep plb   son present and educated on the activities performed         plan for next session-  cont PT with gait trg,   balance trg,   endurance activities all with education.

## 2024-08-14 ENCOUNTER — HOME CARE VISIT (OUTPATIENT)
Dept: HOME HEALTH SERVICES | Facility: HOME HEALTHCARE | Age: 79
End: 2024-08-14
Payer: MEDICARE

## 2024-08-14 VITALS
OXYGEN SATURATION: 96 % | HEART RATE: 126 BPM | RESPIRATION RATE: 15 BRPM | TEMPERATURE: 98.3 F | SYSTOLIC BLOOD PRESSURE: 132 MMHG | DIASTOLIC BLOOD PRESSURE: 60 MMHG

## 2024-08-14 PROCEDURE — G0157 HHC PT ASSISTANT EA 15: HCPCS

## 2024-08-15 NOTE — HOME HEALTH
pt sitting up,  prepared for PT session,   o2 donned and pt feels ok for PT session.    pt reporting no med changes and no complications.    cg present and is assisting as needed.      pt feels she continues to be very weak with limited standing and very poor tolerance to walking.    son requesting continuation of PT services to further improve pts ability to allow her be home alone as he has limited assistance    pt stated she was faitgued at times,  needed several rest periods and was able to stand only up to 1-2 mins prior to fatigue,  while performing ther ex.  pt continues to have dypnea with fatigue but improved with cues needed to properly deep plb.      pt continues to have LE edema,   educated on proper elevation of LEs and to elevate several times daily.     pt was educated on proper form with hep review needing cues to perform within the correct plane         plan for next session-  cont PT with gait trg,  standing and balance trg and hep review with advancement.   possible continuation of PT services

## 2024-08-16 ENCOUNTER — HOME CARE VISIT (OUTPATIENT)
Dept: HOME HEALTH SERVICES | Facility: HOME HEALTHCARE | Age: 79
End: 2024-08-16
Payer: MEDICARE

## 2024-08-16 VITALS
DIASTOLIC BLOOD PRESSURE: 70 MMHG | HEART RATE: 115 BPM | SYSTOLIC BLOOD PRESSURE: 120 MMHG | OXYGEN SATURATION: 92 % | TEMPERATURE: 98 F

## 2024-08-16 PROCEDURE — G0151 HHCP-SERV OF PT,EA 15 MIN: HCPCS

## 2024-08-19 ENCOUNTER — HOME CARE VISIT (OUTPATIENT)
Dept: HOME HEALTH SERVICES | Facility: HOME HEALTHCARE | Age: 79
End: 2024-08-19
Payer: MEDICARE

## 2024-08-19 VITALS
DIASTOLIC BLOOD PRESSURE: 66 MMHG | TEMPERATURE: 97.5 F | SYSTOLIC BLOOD PRESSURE: 108 MMHG | HEART RATE: 88 BPM | OXYGEN SATURATION: 91 %

## 2024-08-19 PROCEDURE — G0151 HHCP-SERV OF PT,EA 15 MIN: HCPCS

## 2024-08-20 ENCOUNTER — HOME CARE VISIT (OUTPATIENT)
Dept: HOME HEALTH SERVICES | Facility: HOME HEALTHCARE | Age: 79
End: 2024-08-20
Payer: MEDICARE

## 2024-08-20 VITALS
DIASTOLIC BLOOD PRESSURE: 76 MMHG | OXYGEN SATURATION: 98 % | TEMPERATURE: 97 F | HEART RATE: 120 BPM | RESPIRATION RATE: 19 BRPM | SYSTOLIC BLOOD PRESSURE: 122 MMHG

## 2024-08-20 PROCEDURE — G0299 HHS/HOSPICE OF RN EA 15 MIN: HCPCS

## 2024-08-21 ENCOUNTER — HOME CARE VISIT (OUTPATIENT)
Dept: HOME HEALTH SERVICES | Facility: HOME HEALTHCARE | Age: 79
End: 2024-08-21
Payer: MEDICARE

## 2024-08-21 VITALS
TEMPERATURE: 97.6 F | HEART RATE: 83 BPM | DIASTOLIC BLOOD PRESSURE: 74 MMHG | OXYGEN SATURATION: 96 % | SYSTOLIC BLOOD PRESSURE: 126 MMHG

## 2024-08-21 PROCEDURE — G0151 HHCP-SERV OF PT,EA 15 MIN: HCPCS

## 2024-08-22 ENCOUNTER — HOME CARE VISIT (OUTPATIENT)
Dept: HOME HEALTH SERVICES | Facility: HOME HEALTHCARE | Age: 79
End: 2024-08-22
Payer: MEDICARE

## 2024-08-22 VITALS
OXYGEN SATURATION: 99 % | TEMPERATURE: 97.7 F | DIASTOLIC BLOOD PRESSURE: 68 MMHG | HEART RATE: 86 BPM | SYSTOLIC BLOOD PRESSURE: 110 MMHG

## 2024-08-22 PROCEDURE — G0299 HHS/HOSPICE OF RN EA 15 MIN: HCPCS

## 2024-08-22 PROCEDURE — G0152 HHCP-SERV OF OT,EA 15 MIN: HCPCS

## 2024-08-23 ENCOUNTER — HOME CARE VISIT (OUTPATIENT)
Dept: HOME HEALTH SERVICES | Facility: HOME HEALTHCARE | Age: 79
End: 2024-08-23
Payer: MEDICARE

## 2024-08-23 VITALS
SYSTOLIC BLOOD PRESSURE: 122 MMHG | OXYGEN SATURATION: 95 % | DIASTOLIC BLOOD PRESSURE: 70 MMHG | TEMPERATURE: 97.7 F | HEART RATE: 88 BPM

## 2024-08-23 PROCEDURE — G0151 HHCP-SERV OF PT,EA 15 MIN: HCPCS

## 2024-08-26 ENCOUNTER — HOME CARE VISIT (OUTPATIENT)
Dept: HOME HEALTH SERVICES | Facility: HOME HEALTHCARE | Age: 79
End: 2024-08-26
Payer: MEDICARE

## 2024-08-26 VITALS
DIASTOLIC BLOOD PRESSURE: 64 MMHG | SYSTOLIC BLOOD PRESSURE: 112 MMHG | OXYGEN SATURATION: 94 % | TEMPERATURE: 97.4 F | HEART RATE: 98 BPM

## 2024-08-26 PROCEDURE — G0151 HHCP-SERV OF PT,EA 15 MIN: HCPCS

## 2024-08-26 NOTE — HOME HEALTH
Pt discharged from OT services as pt has met most goals.   Pt and therapist in agreement with dc.   Pt to continue with HEP.      Pt denies any falls or med changes

## 2024-08-27 ENCOUNTER — HOME CARE VISIT (OUTPATIENT)
Dept: HOME HEALTH SERVICES | Facility: HOME HEALTHCARE | Age: 79
End: 2024-08-27
Payer: MEDICARE

## 2024-08-27 VITALS
HEART RATE: 88 BPM | TEMPERATURE: 98 F | OXYGEN SATURATION: 98 % | DIASTOLIC BLOOD PRESSURE: 72 MMHG | RESPIRATION RATE: 18 BRPM | SYSTOLIC BLOOD PRESSURE: 122 MMHG

## 2024-08-27 PROCEDURE — G0299 HHS/HOSPICE OF RN EA 15 MIN: HCPCS

## 2024-08-28 ENCOUNTER — HOME CARE VISIT (OUTPATIENT)
Dept: HOME HEALTH SERVICES | Facility: HOME HEALTHCARE | Age: 79
End: 2024-08-28
Payer: MEDICARE

## 2024-08-28 VITALS
SYSTOLIC BLOOD PRESSURE: 112 MMHG | TEMPERATURE: 97.4 F | DIASTOLIC BLOOD PRESSURE: 70 MMHG | HEART RATE: 99 BPM | OXYGEN SATURATION: 98 %

## 2024-08-28 PROCEDURE — G0151 HHCP-SERV OF PT,EA 15 MIN: HCPCS

## 2024-08-30 ENCOUNTER — HOME CARE VISIT (OUTPATIENT)
Dept: HOME HEALTH SERVICES | Facility: HOME HEALTHCARE | Age: 79
End: 2024-08-30
Payer: MEDICARE

## 2024-08-30 VITALS
OXYGEN SATURATION: 99 % | SYSTOLIC BLOOD PRESSURE: 124 MMHG | TEMPERATURE: 97.6 F | DIASTOLIC BLOOD PRESSURE: 70 MMHG | HEART RATE: 79 BPM

## 2024-08-30 PROCEDURE — G0151 HHCP-SERV OF PT,EA 15 MIN: HCPCS

## 2024-09-03 ENCOUNTER — HOME CARE VISIT (OUTPATIENT)
Dept: HOME HEALTH SERVICES | Facility: HOME HEALTHCARE | Age: 79
End: 2024-09-03
Payer: MEDICARE

## 2024-09-03 VITALS
HEART RATE: 92 BPM | DIASTOLIC BLOOD PRESSURE: 70 MMHG | SYSTOLIC BLOOD PRESSURE: 110 MMHG | OXYGEN SATURATION: 91 % | TEMPERATURE: 97.6 F

## 2024-09-03 PROCEDURE — G0151 HHCP-SERV OF PT,EA 15 MIN: HCPCS

## 2024-09-04 ENCOUNTER — HOME CARE VISIT (OUTPATIENT)
Dept: HOME HEALTH SERVICES | Facility: HOME HEALTHCARE | Age: 79
End: 2024-09-04
Payer: MEDICARE

## 2024-09-04 VITALS
HEART RATE: 94 BPM | OXYGEN SATURATION: 92 % | SYSTOLIC BLOOD PRESSURE: 130 MMHG | TEMPERATURE: 97.6 F | DIASTOLIC BLOOD PRESSURE: 80 MMHG

## 2024-09-04 PROCEDURE — G0151 HHCP-SERV OF PT,EA 15 MIN: HCPCS

## 2024-09-05 ENCOUNTER — HOME CARE VISIT (OUTPATIENT)
Dept: HOME HEALTH SERVICES | Facility: HOME HEALTHCARE | Age: 79
End: 2024-09-05
Payer: MEDICARE

## 2024-09-05 VITALS
HEART RATE: 99 BPM | TEMPERATURE: 97.8 F | SYSTOLIC BLOOD PRESSURE: 120 MMHG | DIASTOLIC BLOOD PRESSURE: 70 MMHG | OXYGEN SATURATION: 91 %

## 2024-09-05 PROCEDURE — G0151 HHCP-SERV OF PT,EA 15 MIN: HCPCS

## 2024-11-29 ENCOUNTER — APPOINTMENT (OUTPATIENT)
Dept: GENERAL RADIOLOGY | Facility: HOSPITAL | Age: 79
End: 2024-11-29
Payer: MEDICARE

## 2024-11-29 ENCOUNTER — APPOINTMENT (OUTPATIENT)
Dept: CARDIOLOGY | Facility: HOSPITAL | Age: 79
End: 2024-11-29
Payer: MEDICARE

## 2024-11-29 ENCOUNTER — HOSPITAL ENCOUNTER (INPATIENT)
Facility: HOSPITAL | Age: 79
LOS: 19 days | Discharge: NURSING, REHAB OR LONG-TERM CARE | End: 2024-12-19
Attending: INTERNAL MEDICINE
Payer: MEDICARE

## 2024-11-29 DIAGNOSIS — I48.91 ATRIAL FIBRILLATION WITH RVR: Primary | ICD-10-CM

## 2024-11-29 DIAGNOSIS — I77.0 A-V FISTULA: ICD-10-CM

## 2024-11-29 DIAGNOSIS — I82.621 ARM DVT (DEEP VENOUS THROMBOEMBOLISM), ACUTE, RIGHT: ICD-10-CM

## 2024-11-29 LAB
ANION GAP SERPL CALCULATED.3IONS-SCNC: 7.9 MMOL/L (ref 5–15)
APTT PPP: 36 SECONDS (ref 22.7–35.4)
APTT PPP: 36.2 SECONDS (ref 22.7–35.4)
BASOPHILS # BLD AUTO: 0.04 10*3/MM3 (ref 0–0.2)
BASOPHILS NFR BLD AUTO: 0.2 % (ref 0–1.5)
BH CV UPPER VENOUS LEFT INTERNAL JUGULAR AUGMENT: NORMAL
BH CV UPPER VENOUS LEFT INTERNAL JUGULAR COMPRESS: NORMAL
BH CV UPPER VENOUS LEFT INTERNAL JUGULAR PHASIC: NORMAL
BH CV UPPER VENOUS LEFT INTERNAL JUGULAR SPONT: NORMAL
BH CV UPPER VENOUS LEFT SUBCLAVIAN AUGMENT: NORMAL
BH CV UPPER VENOUS LEFT SUBCLAVIAN COMPRESS: NORMAL
BH CV UPPER VENOUS LEFT SUBCLAVIAN PHASIC: NORMAL
BH CV UPPER VENOUS LEFT SUBCLAVIAN SPONT: NORMAL
BH CV UPPER VENOUS RIGHT AXILLARY AUGMENT: NORMAL
BH CV UPPER VENOUS RIGHT AXILLARY COMPRESS: NORMAL
BH CV UPPER VENOUS RIGHT AXILLARY PHASIC: NORMAL
BH CV UPPER VENOUS RIGHT AXILLARY SPONT: NORMAL
BH CV UPPER VENOUS RIGHT BASILIC FOREARM COMPRESS: NORMAL
BH CV UPPER VENOUS RIGHT BASILIC UPPER COMPRESS: NORMAL
BH CV UPPER VENOUS RIGHT BRACHIAL COMPRESS: NORMAL
BH CV UPPER VENOUS RIGHT CEPHALIC FOREARM COMPRESS: NORMAL
BH CV UPPER VENOUS RIGHT INTERNAL JUGULAR COLOR: 1
BH CV UPPER VENOUS RIGHT INTERNAL JUGULAR COMPRESS: NORMAL
BH CV UPPER VENOUS RIGHT INTERNAL JUGULAR THROMBUS: NORMAL
BH CV UPPER VENOUS RIGHT RADIAL COMPRESS: NORMAL
BH CV UPPER VENOUS RIGHT SUBCLAVIAN AUGMENT: NORMAL
BH CV UPPER VENOUS RIGHT SUBCLAVIAN PHASIC: NORMAL
BH CV UPPER VENOUS RIGHT SUBCLAVIAN SPONT: NORMAL
BH CV UPPER VENOUS RIGHT ULNAR COMPRESS: NORMAL
BH CV VAS PRELIMINARY FINDINGS SCRIPTING: 1
BUN SERPL-MCNC: 13 MG/DL (ref 8–23)
BUN/CREAT SERPL: 18.3 (ref 7–25)
CALCIUM SPEC-SCNC: 9.4 MG/DL (ref 8.6–10.5)
CHLORIDE SERPL-SCNC: 100 MMOL/L (ref 98–107)
CO2 SERPL-SCNC: 29.1 MMOL/L (ref 22–29)
CREAT SERPL-MCNC: 0.71 MG/DL (ref 0.57–1)
DEPRECATED RDW RBC AUTO: 61.8 FL (ref 37–54)
EGFRCR SERPLBLD CKD-EPI 2021: 87.7 ML/MIN/1.73
EOSINOPHIL # BLD AUTO: 0 10*3/MM3 (ref 0–0.4)
EOSINOPHIL NFR BLD AUTO: 0 % (ref 0.3–6.2)
ERYTHROCYTE [DISTWIDTH] IN BLOOD BY AUTOMATED COUNT: 18.6 % (ref 12.3–15.4)
GLUCOSE SERPL-MCNC: 102 MG/DL (ref 65–99)
HCT VFR BLD AUTO: 31.6 % (ref 34–46.6)
HGB BLD-MCNC: 8.9 G/DL (ref 12–15.9)
IMM GRANULOCYTES # BLD AUTO: 0.14 10*3/MM3 (ref 0–0.05)
IMM GRANULOCYTES NFR BLD AUTO: 0.8 % (ref 0–0.5)
INR PPP: 2.22 (ref 0.9–1.1)
LYMPHOCYTES # BLD AUTO: 0.76 10*3/MM3 (ref 0.7–3.1)
LYMPHOCYTES NFR BLD AUTO: 4.3 % (ref 19.6–45.3)
MAGNESIUM SERPL-MCNC: 1.9 MG/DL (ref 1.6–2.4)
MCH RBC QN AUTO: 25.9 PG (ref 26.6–33)
MCHC RBC AUTO-ENTMCNC: 28.2 G/DL (ref 31.5–35.7)
MCV RBC AUTO: 91.9 FL (ref 79–97)
MONOCYTES # BLD AUTO: 0.96 10*3/MM3 (ref 0.1–0.9)
MONOCYTES NFR BLD AUTO: 5.4 % (ref 5–12)
NEUTROPHILS NFR BLD AUTO: 15.85 10*3/MM3 (ref 1.7–7)
NEUTROPHILS NFR BLD AUTO: 89.3 % (ref 42.7–76)
NRBC BLD AUTO-RTO: 0 /100 WBC (ref 0–0.2)
PLATELET # BLD AUTO: 169 10*3/MM3 (ref 140–450)
PMV BLD AUTO: 9.2 FL (ref 6–12)
POTASSIUM SERPL-SCNC: 4.9 MMOL/L (ref 3.5–5.2)
PROTHROMBIN TIME: 24.7 SECONDS (ref 11.7–14.2)
QT INTERVAL: 270 MS
QTC INTERVAL: 433 MS
RBC # BLD AUTO: 3.44 10*6/MM3 (ref 3.77–5.28)
SODIUM SERPL-SCNC: 137 MMOL/L (ref 136–145)
TSH SERPL DL<=0.05 MIU/L-ACNC: 17.2 UIU/ML (ref 0.27–4.2)
WBC NRBC COR # BLD AUTO: 17.75 10*3/MM3 (ref 3.4–10.8)

## 2024-11-29 PROCEDURE — 85730 THROMBOPLASTIN TIME PARTIAL: CPT | Performed by: NURSE PRACTITIONER

## 2024-11-29 PROCEDURE — 25010000002 HEPARIN (PORCINE) 25000-0.45 UT/250ML-% SOLUTION: Performed by: INTERNAL MEDICINE

## 2024-11-29 PROCEDURE — 83735 ASSAY OF MAGNESIUM: CPT | Performed by: NURSE PRACTITIONER

## 2024-11-29 PROCEDURE — 87481 CANDIDA DNA AMP PROBE: CPT | Performed by: INTERNAL MEDICINE

## 2024-11-29 PROCEDURE — 93971 EXTREMITY STUDY: CPT | Performed by: STUDENT IN AN ORGANIZED HEALTH CARE EDUCATION/TRAINING PROGRAM

## 2024-11-29 PROCEDURE — G0378 HOSPITAL OBSERVATION PER HR: HCPCS

## 2024-11-29 PROCEDURE — 80048 BASIC METABOLIC PNL TOTAL CA: CPT | Performed by: NURSE PRACTITIONER

## 2024-11-29 PROCEDURE — 84443 ASSAY THYROID STIM HORMONE: CPT | Performed by: NURSE PRACTITIONER

## 2024-11-29 PROCEDURE — 85730 THROMBOPLASTIN TIME PARTIAL: CPT | Performed by: INTERNAL MEDICINE

## 2024-11-29 PROCEDURE — 63710000001 TACROLIMUS PER 1 MG: Performed by: INTERNAL MEDICINE

## 2024-11-29 PROCEDURE — 85610 PROTHROMBIN TIME: CPT | Performed by: NURSE PRACTITIONER

## 2024-11-29 PROCEDURE — 99285 EMERGENCY DEPT VISIT HI MDM: CPT

## 2024-11-29 PROCEDURE — 93971 EXTREMITY STUDY: CPT

## 2024-11-29 PROCEDURE — 93005 ELECTROCARDIOGRAM TRACING: CPT | Performed by: NURSE PRACTITIONER

## 2024-11-29 PROCEDURE — 85025 COMPLETE CBC W/AUTO DIFF WBC: CPT | Performed by: NURSE PRACTITIONER

## 2024-11-29 PROCEDURE — 71045 X-RAY EXAM CHEST 1 VIEW: CPT

## 2024-11-29 RX ORDER — MIRTAZAPINE 15 MG/1
15 TABLET, FILM COATED ORAL NIGHTLY
COMMUNITY

## 2024-11-29 RX ORDER — METOPROLOL TARTRATE 50 MG
50 TABLET ORAL 3 TIMES DAILY
COMMUNITY
End: 2024-12-19 | Stop reason: HOSPADM

## 2024-11-29 RX ORDER — NITROGLYCERIN 0.4 MG/1
0.4 TABLET SUBLINGUAL
Status: DISCONTINUED | OUTPATIENT
Start: 2024-11-29 | End: 2024-12-01

## 2024-11-29 RX ORDER — SODIUM CHLORIDE 0.9 % (FLUSH) 0.9 %
10 SYRINGE (ML) INJECTION AS NEEDED
Status: DISCONTINUED | OUTPATIENT
Start: 2024-11-29 | End: 2024-12-19 | Stop reason: HOSPADM

## 2024-11-29 RX ORDER — METOPROLOL TARTRATE 50 MG
50 TABLET ORAL EVERY 8 HOURS
Status: DISCONTINUED | OUTPATIENT
Start: 2024-11-29 | End: 2024-12-02

## 2024-11-29 RX ORDER — SODIUM CHLORIDE 9 MG/ML
40 INJECTION, SOLUTION INTRAVENOUS AS NEEDED
Status: DISCONTINUED | OUTPATIENT
Start: 2024-11-29 | End: 2024-12-19 | Stop reason: HOSPADM

## 2024-11-29 RX ORDER — POLYETHYLENE GLYCOL 3350 17 G/17G
17 POWDER, FOR SOLUTION ORAL DAILY
COMMUNITY

## 2024-11-29 RX ORDER — FUROSEMIDE 40 MG/1
40 TABLET ORAL DAILY
Status: DISCONTINUED | OUTPATIENT
Start: 2024-11-29 | End: 2024-12-02

## 2024-11-29 RX ORDER — SODIUM CHLORIDE 0.9 % (FLUSH) 0.9 %
10 SYRINGE (ML) INJECTION EVERY 12 HOURS SCHEDULED
Status: DISCONTINUED | OUTPATIENT
Start: 2024-11-29 | End: 2024-12-19 | Stop reason: HOSPADM

## 2024-11-29 RX ORDER — DULOXETIN HYDROCHLORIDE 20 MG/1
40 CAPSULE, DELAYED RELEASE ORAL DAILY
Status: DISCONTINUED | OUTPATIENT
Start: 2024-11-29 | End: 2024-12-01

## 2024-11-29 RX ORDER — LEVOTHYROXINE SODIUM 50 UG/1
50 TABLET ORAL
Status: DISCONTINUED | OUTPATIENT
Start: 2024-11-30 | End: 2024-12-19 | Stop reason: HOSPADM

## 2024-11-29 RX ORDER — POLYETHYLENE GLYCOL 3350 17 G/17G
17 POWDER, FOR SOLUTION ORAL DAILY PRN
Status: DISCONTINUED | OUTPATIENT
Start: 2024-11-29 | End: 2024-12-19 | Stop reason: HOSPADM

## 2024-11-29 RX ORDER — POTASSIUM CHLORIDE 750 MG/1
10 CAPSULE, EXTENDED RELEASE ORAL DAILY
COMMUNITY
End: 2024-12-19 | Stop reason: HOSPADM

## 2024-11-29 RX ORDER — POTASSIUM CHLORIDE 750 MG/1
10 TABLET, FILM COATED, EXTENDED RELEASE ORAL DAILY
Status: DISCONTINUED | OUTPATIENT
Start: 2024-11-30 | End: 2024-12-01

## 2024-11-29 RX ORDER — BISACODYL 10 MG
10 SUPPOSITORY, RECTAL RECTAL DAILY PRN
Status: DISCONTINUED | OUTPATIENT
Start: 2024-11-29 | End: 2024-12-19 | Stop reason: HOSPADM

## 2024-11-29 RX ORDER — BISACODYL 5 MG/1
5 TABLET, DELAYED RELEASE ORAL DAILY PRN
Status: DISCONTINUED | OUTPATIENT
Start: 2024-11-29 | End: 2024-12-19 | Stop reason: HOSPADM

## 2024-11-29 RX ORDER — TACROLIMUS 0.5 MG/1
0.5 CAPSULE ORAL 2 TIMES DAILY
Status: DISCONTINUED | OUTPATIENT
Start: 2024-11-29 | End: 2024-12-01

## 2024-11-29 RX ORDER — AMOXICILLIN 250 MG
1 CAPSULE ORAL 2 TIMES DAILY
COMMUNITY

## 2024-11-29 RX ORDER — SIMVASTATIN 20 MG
20 TABLET ORAL NIGHTLY
COMMUNITY

## 2024-11-29 RX ORDER — QUETIAPINE FUMARATE 25 MG/1
25 TABLET, FILM COATED ORAL NIGHTLY
COMMUNITY

## 2024-11-29 RX ORDER — HEPARIN SODIUM 10000 [USP'U]/100ML
18 INJECTION, SOLUTION INTRAVENOUS
Status: DISCONTINUED | OUTPATIENT
Start: 2024-11-29 | End: 2024-12-05

## 2024-11-29 RX ORDER — DILTIAZEM HYDROCHLORIDE 30 MG/1
30 TABLET, FILM COATED ORAL EVERY 8 HOURS SCHEDULED
Status: DISCONTINUED | OUTPATIENT
Start: 2024-11-29 | End: 2024-11-29

## 2024-11-29 RX ORDER — POLYETHYLENE GLYCOL 3350 17 G/17G
17 POWDER, FOR SOLUTION ORAL DAILY
Status: DISCONTINUED | OUTPATIENT
Start: 2024-11-30 | End: 2024-12-02

## 2024-11-29 RX ORDER — AMOXICILLIN 250 MG
1 CAPSULE ORAL 2 TIMES DAILY
Status: DISCONTINUED | OUTPATIENT
Start: 2024-11-29 | End: 2024-12-02

## 2024-11-29 RX ORDER — LORAZEPAM 0.5 MG/1
0.5 TABLET ORAL EVERY 6 HOURS
Status: DISCONTINUED | OUTPATIENT
Start: 2024-11-29 | End: 2024-12-01

## 2024-11-29 RX ORDER — ATORVASTATIN CALCIUM 10 MG/1
10 TABLET, FILM COATED ORAL DAILY
Status: DISCONTINUED | OUTPATIENT
Start: 2024-11-30 | End: 2024-12-19 | Stop reason: HOSPADM

## 2024-11-29 RX ORDER — HYDROXYZINE HYDROCHLORIDE 25 MG/1
25 TABLET, FILM COATED ORAL NIGHTLY
COMMUNITY

## 2024-11-29 RX ORDER — HYDROCODONE BITARTRATE AND ACETAMINOPHEN 7.5; 325 MG/1; MG/1
1 TABLET ORAL EVERY 6 HOURS PRN
Status: DISCONTINUED | OUTPATIENT
Start: 2024-11-29 | End: 2024-12-09

## 2024-11-29 RX ORDER — LANOLIN ALCOHOL/MO/W.PET/CERES
1000 CREAM (GRAM) TOPICAL DAILY
COMMUNITY

## 2024-11-29 RX ORDER — ALBUTEROL SULFATE 90 UG/1
2 INHALANT RESPIRATORY (INHALATION) EVERY 6 HOURS PRN
COMMUNITY

## 2024-11-29 RX ORDER — METOPROLOL TARTRATE 1 MG/ML
5 INJECTION, SOLUTION INTRAVENOUS ONCE
Status: COMPLETED | OUTPATIENT
Start: 2024-11-29 | End: 2024-11-29

## 2024-11-29 RX ORDER — ERGOCALCIFEROL 1.25 MG/1
50000 CAPSULE, LIQUID FILLED ORAL WEEKLY
COMMUNITY

## 2024-11-29 RX ORDER — MIRTAZAPINE 15 MG/1
15 TABLET, FILM COATED ORAL NIGHTLY
Status: DISCONTINUED | OUTPATIENT
Start: 2024-11-29 | End: 2024-12-19 | Stop reason: HOSPADM

## 2024-11-29 RX ORDER — QUETIAPINE FUMARATE 25 MG/1
25 TABLET, FILM COATED ORAL NIGHTLY
Status: DISCONTINUED | OUTPATIENT
Start: 2024-11-29 | End: 2024-12-19 | Stop reason: HOSPADM

## 2024-11-29 RX ORDER — AMOXICILLIN 250 MG
2 CAPSULE ORAL 2 TIMES DAILY PRN
Status: DISCONTINUED | OUTPATIENT
Start: 2024-11-29 | End: 2024-12-19 | Stop reason: HOSPADM

## 2024-11-29 RX ORDER — HYDROXYZINE HYDROCHLORIDE 25 MG/1
25 TABLET, FILM COATED ORAL NIGHTLY
Status: DISCONTINUED | OUTPATIENT
Start: 2024-11-29 | End: 2024-12-19 | Stop reason: HOSPADM

## 2024-11-29 RX ORDER — DIAZEPAM 5 MG/1
5 TABLET ORAL EVERY 12 HOURS PRN
Status: DISCONTINUED | OUTPATIENT
Start: 2024-11-29 | End: 2024-12-13

## 2024-11-29 RX ORDER — LORAZEPAM 0.5 MG/1
0.5 TABLET ORAL EVERY 6 HOURS
COMMUNITY
End: 2024-12-19 | Stop reason: HOSPADM

## 2024-11-29 RX ORDER — ONDANSETRON 4 MG/1
4 TABLET, FILM COATED ORAL EVERY 8 HOURS PRN
COMMUNITY

## 2024-11-29 RX ORDER — CHOLESTYRAMINE LIGHT 4 G/5.7G
1 POWDER, FOR SUSPENSION ORAL EVERY 24 HOURS
Status: DISCONTINUED | OUTPATIENT
Start: 2024-11-30 | End: 2024-12-19 | Stop reason: HOSPADM

## 2024-11-29 RX ADMIN — SENNOSIDES AND DOCUSATE SODIUM 1 TABLET: 50; 8.6 TABLET ORAL at 21:49

## 2024-11-29 RX ADMIN — HYDROXYZINE HYDROCHLORIDE 25 MG: 25 TABLET ORAL at 21:49

## 2024-11-29 RX ADMIN — TACROLIMUS 0.5 MG: 0.5 CAPSULE ORAL at 19:52

## 2024-11-29 RX ADMIN — HEPARIN SODIUM 18 UNITS/KG/HR: 10000 INJECTION, SOLUTION INTRAVENOUS at 18:05

## 2024-11-29 RX ADMIN — HYDROCODONE BITARTRATE AND ACETAMINOPHEN 1 TABLET: 7.5; 325 TABLET ORAL at 19:16

## 2024-11-29 RX ADMIN — LORAZEPAM 0.5 MG: 0.5 TABLET ORAL at 21:49

## 2024-11-29 RX ADMIN — DULOXETINE HYDROCHLORIDE 40 MG: 20 CAPSULE, DELAYED RELEASE ORAL at 19:52

## 2024-11-29 RX ADMIN — Medication 10 ML: at 19:52

## 2024-11-29 RX ADMIN — FUROSEMIDE 40 MG: 40 TABLET ORAL at 19:16

## 2024-11-29 RX ADMIN — METOPROLOL TARTRATE 5 MG: 1 INJECTION, SOLUTION INTRAVENOUS at 13:22

## 2024-11-29 RX ADMIN — MIRTAZAPINE 15 MG: 15 TABLET, FILM COATED ORAL at 21:49

## 2024-11-29 RX ADMIN — METOPROLOL TARTRATE 50 MG: 50 TABLET, FILM COATED ORAL at 19:52

## 2024-11-29 RX ADMIN — QUETIAPINE FUMARATE 25 MG: 25 TABLET ORAL at 21:49

## 2024-11-29 NOTE — ED NOTES
Nursing report ED to floor  Jaja Carcamo  79 y.o.  female    HPI:   Chief Complaint   Patient presents with    Edema       Admitting doctor:   Hernesto Snyder MD    Admitting diagnosis:   The primary encounter diagnosis was Atrial fibrillation with RVR. Diagnoses of A-V fistula and Arm DVT (deep venous thromboembolism), acute, right were also pertinent to this visit.    Code status:   Current Code Status       Date Active Code Status Order ID Comments User Context       Prior            Allergies:   Zolpidem    Isolation:  Contact Spore     Fall Risk:  Fall Risk Assessment was completed, and patient is at high risk for falls.   Predictive Model Details         14 (Low) Factor Value    Calculated 11/29/2024 15:51 Age 79    Risk of Fall Model Active Peripheral IV Present     Imaging order in this encounter Present     Respiratory Rate 20     Diastolic BP 51     Magnesium 1.9 mg/dL     Chapo Scale not on file     Chloride 100 mmol/L     Number of Distinct Medication Classes administered 1     Cardiac Assessment X     Albumin not on file     Potassium 4.9 mmol/L     Calcium 9.4 mg/dL     Total Bilirubin not on file     Days after Admission 0.133     Tobacco Use Quit     Creatinine 0.71 mg/dL     ALT not on file         Weight:       11/29/24  1238   Weight: 74.5 kg (164 lb 3.9 oz)       Intake and Output  No intake or output data in the 24 hours ending 11/29/24 1601    Diet:        Most recent vitals:   Vitals:    11/29/24 1331 11/29/24 1434 11/29/24 1523 11/29/24 1524   BP: 111/65 115/66  115/51   Pulse: 106 (!) 124 116 (!) 129   Resp:  24  20   Temp:       TempSrc:       SpO2: 94% 94% 91% 96%   Weight:       Height:           Active LDAs/IV Access:   Lines, Drains & Airways       Active LDAs       Name Placement date Placement time Site Days    Peripheral IV 11/29/24 1304 Left Antecubital 11/29/24  1304  Antecubital  less than 1                    Skin Condition:   Skin Assessments (last day)       None              Labs (abnormal labs have a star):   Labs Reviewed   PROTIME-INR - Abnormal; Notable for the following components:       Result Value    Protime 24.7 (*)     INR 2.22 (*)     All other components within normal limits   APTT - Abnormal; Notable for the following components:    PTT 36.2 (*)     All other components within normal limits   BASIC METABOLIC PANEL - Abnormal; Notable for the following components:    Glucose 102 (*)     CO2 29.1 (*)     All other components within normal limits    Narrative:     GFR Normal >60  Chronic Kidney Disease <60  Kidney Failure <15    The GFR formula is only valid for adults with stable renal function between ages 18 and 70.   TSH - Abnormal; Notable for the following components:    TSH 17.200 (*)     All other components within normal limits   CBC WITH AUTO DIFFERENTIAL - Abnormal; Notable for the following components:    WBC 17.75 (*)     RBC 3.44 (*)     Hemoglobin 8.9 (*)     Hematocrit 31.6 (*)     MCH 25.9 (*)     MCHC 28.2 (*)     RDW 18.6 (*)     RDW-SD 61.8 (*)     Neutrophil % 89.3 (*)     Lymphocyte % 4.3 (*)     Eosinophil % 0.0 (*)     Immature Grans % 0.8 (*)     Neutrophils, Absolute 15.85 (*)     Monocytes, Absolute 0.96 (*)     Immature Grans, Absolute 0.14 (*)     All other components within normal limits   MAGNESIUM - Normal   CBC AND DIFFERENTIAL    Narrative:     The following orders were created for panel order CBC & Differential.  Procedure                               Abnormality         Status                     ---------                               -----------         ------                     CBC Auto Differential[[id number]]        Abnormal            Final result                 Please view results for these tests on the individual orders.       LOC: Person, Place, Time, and Situation    Telemetry:  Telemetry    Cardiac Monitoring Ordered: yes    EKG:   ECG 12 Lead Tachycardia   Preliminary Result   HEART DYKF=947  bpm   RR Yhkenrwi=607  ms   OR  Interval=  ms   P Horizontal Axis=  deg   P Front Axis=  deg   QRSD Interval=67  ms   QT Zaqnugba=739  ms   GWtT=965  ms   QRS Axis=-45  deg   T Wave Axis=143  deg   - ABNORMAL ECG -   Atrial fibrillation with rapid V-rate   Paired ventricular premature complexes   Left anterior fascicular block   Low voltage, extremity and precordial leads   Nonspecific T abnrm, anterolateral leads   Date and Time of Study:2024-11-29 13:09:23          Medications Given in the ED:   Medications   sodium chloride 0.9 % flush 10 mL (has no administration in time range)   metoprolol tartrate (LOPRESSOR) injection 5 mg (5 mg Intravenous Given 11/29/24 1322)       Imaging results:  XR Chest 1 View    Result Date: 11/29/2024  Impression: 1.  Persistent retrocardiac left lower lobe airspace disease may represent atelectasis or pneumonia. 2.  No new airspace disease is identified. 2. Probable trace bibasilar pleural effusions. 3. Stable cardiomegaly. Electronically Signed: Karie Bustos MD  11/29/2024 1:39 PM EST  Workstation ID: [id number]     Social issues:   Social History     Socioeconomic History    Marital status:    Tobacco Use    Smoking status: Former     Passive exposure: Never    Smokeless tobacco: Never   Vaping Use    Vaping status: Former   Substance and Sexual Activity    Alcohol use: Never    Drug use: Never    Sexual activity: Defer       NIH Stroke Scale:  Interval: (not recorded)  1a. Level of Consciousness: (not recorded)  1b. LOC Questions: (not recorded)  1c. LOC Commands: (not recorded)  2. Best Gaze: (not recorded)  3. Visual: (not recorded)  4. Facial Palsy: (not recorded)  5a. Motor Arm, Left: (not recorded)  5b. Motor Arm, Right: (not recorded)  6a. Motor Leg, Left: (not recorded)  6b. Motor Leg, Right: (not recorded)  7. Limb Ataxia: (not recorded)  8. Sensory: (not recorded)  9. Best Language: (not recorded)  10. Dysarthria: (not recorded)  11. Extinction and Inattention (formerly Neglect): (not  recorded)    Total (NIH Stroke Scale): (not recorded)     Additional notable assessment information:     Nursing report ED to floor:  2D    Venkatesh Overton RN   11/29/24 16:01 EST

## 2024-11-29 NOTE — Clinical Note
Level of Care: Telemetry [5]   Admitting Physician: XIOMARA MILLS [[id number]]   Attending Physician: XIOMARA MILLS [[id number]]   Bed Request Comments: cardizem gtt

## 2024-11-29 NOTE — H&P
[facility] Medicine Services  History & Physical    Patient Name: Jaja Carcamo  : 1945  MRN: [id number]  Primary Care Physician:  Kvng Guillen MD  Date of admission: 2024  Date and Time of Service: 2024 at 1600    Subjective      Chief Complaint: Right arm pain and swelling    History of Present Illness: Jaja Carcamo is a 79 y.o. female with a CMH of atrial flutter, COPD, previous history of ESRD now with renal transplant, hypertension who presents to the hospital with complaints of right arm swelling and pain.  The patient is a poor historian but was able to tell me that she has an AV fistula on her right arm for previous history of ESRD on dialysis.  However she received a renal transplant a few years ago and has not required hemodialysis since then.  She did notice that over the past few weeks she has had increasing right upper extremity pain and swelling that is extending all the way up to her right shoulder.  She is complaining of some enlargement of veins in her right upper arm and shoulder area.  She also complains of pain in that area.  She denies any shortness of breath, cough, congestion, fevers, chills.  RUE ultrasound in the ER did show extensive DVT of the right internal jugular vein.      Review of Systems   10 point ROS note except for above      Personal History     Past Medical History:   Diagnosis Date   • Allergic rhinitis 2015   • Asthma 08/10/2017   • Atrial flutter 2017   • Chronic diarrhea 04/15/2019   • Chronic hypoxemic respiratory failure 2024   • Chronic pain 2015   • COPD (chronic obstructive pulmonary disease)    • COVID-19 virus detected 2022   • Cytokine release syndrome, grade 1 2022   • Edema of both lower extremities due to peripheral venous insufficiency 2021   • ESRD on hemodialysis 2013   • Essential (primary) hypertension 2022   • Fracture of ulnar styloid 2022   • Fracture of  unspecified carpal bone, right wrist, subsequent encounter for fracture with routine healing 03/03/2022   • Gastroesophageal reflux disease 11/12/2020   • Gout 08/10/2017   • Hearing loss 08/10/2017   • History of appendectomy    • History of DVT (deep vein thrombosis) 11/12/2020   • History of kidney transplant 06/30/2017   • History of lobectomy of lung 01/18/2024 03/08/2016:  RIGHT Lower Lobe Mass--> Right Video-assisted thoracoscopy with a moderate-to-large wedge resection of the RLL (by Dr. Haris Mcconnell @ [facility])--> Poorly differentiated carcinoma of the RLL.     • History of repair of hip joint 05/21/2013   • History of suicide attempt 05/20/2024   • Hyperlipidemia 08/11/2014   • Hypothyroidism, unspecified 03/03/2022   • Infection due to extended spectrum beta lactamase (ESBL) producing bacteria 03/11/2016    No A2K system hx. +ESBL E coli urine on 3/11/16.     • Irritable bowel syndrome 04/15/2019   • Long term (current) use of immunosuppressive biologic 04/28/2021   • Long term current use of anticoagulant therapy 01/18/2024   • Malignant neoplasm of lung 08/10/2017   • Menopausal flushing 08/30/2021   • Mitral valve regurgitation 07/06/2015   • Mixed anxiety and depressive disorder 04/30/2015   • Non-small cell lung cancer 08/10/2017   • Nonobstructive atherosclerosis of coronary artery 06/02/2019 08/12/2022: CATH: Prashant: NSTEMI assoc with Covid-19: LM:-nl;  LAD: diffuse, Ca++; 30%; CIRC: Dominant. Normal; RCA: small; 50% diffuse, proximal and mid-segment.     • NSTEMI (non-ST elevated myocardial infarction) 08/11/2022   • Obsessive-compulsive disorder 04/15/2019   • Osteoarthritis 05/20/2024   • Paroxysmal atrial fibrillation 05/11/2017   • Paroxysmal supraventricular tachycardia 05/11/2017   • Peripheral neuropathy 08/11/2014   • Personal history of peptic ulcer disease 11/12/2020   • Postoperative anemia due to acute blood loss 05/22/2013   • Right wrist fracture 03/01/2022   •  Seasonal allergies 08/10/2017   • Secondary hyperparathyroidism of renal origin 09/14/2015   • Tricuspid valve regurgitation 03/15/2017   • Ulcer of lower extremity 08/30/2021   • Unspecified acute appendicitis 03/03/2022   • Valvular heart disease 05/20/2024   • Wegener's granulomatosis with renal involvement 03/03/2022       Past Surgical History:   Procedure Laterality Date   • APPENDECTOMY     • BREAST SURGERY Left     cysts rmeoved   • CARDIAC CATHETERIZATION Right 08/12/2022    Procedure: Left Heart Cath and coronary angiogram;  Surgeon: Jak Gavin MD;  Location: [facility] CATH INVASIVE LOCATION;  Service: Cardiology;  Laterality: Right;   • CLOSED REDUCTION WRIST FRACTURE Right 03/01/2022    Procedure: WRIST CLOSED REDUCTION;  Surgeon: Gaudencio Townsend MD;  Location: [facility] MAIN OR;  Service: Orthopedics;  Laterality: Right;   • CYSTOSCOPY     • HIP ARTHROPLASTY     • HYSTERECTOMY     • LUNG LOBECTOMY Right    • TRANSPLANTATION RENAL         Family History: family history includes No Known Problems in her father and mother. Otherwise pertinent FHx was reviewed and not pertinent to current issue.    Social History:  reports that she has quit smoking. She has never been exposed to tobacco smoke. She has never used smokeless tobacco. She reports that she does not drink alcohol and does not use drugs.    Home Medications:  Prior to Admission Medications       Prescriptions Last Dose Informant Patient Reported? Taking?    acetaminophen (Tylenol) 325 MG tablet   Yes No    Take 2 tablets by mouth Every 4 (Four) Hours As Needed for Fever or Mild Pain. Indications: Pain    apixaban (ELIQUIS) 5 MG tablet tablet   No No    Take 1 tablet by mouth Every 12 (Twelve) Hours.    cholestyramine light 4 g packet  Self No No    Take 1 packet by mouth Every 12 (Twelve) Hours.    Patient not taking:  Reported on 7/11/2024    colestipol (Colestid) 1 g tablet   Yes No    Take 1 g by mouth Daily. Indications: High Amount of Cholesterol in  the Blood    diazePAM (Valium) 5 MG tablet   Yes No    Take 5 mg by mouth Every 12 (Twelve) Hours As Needed for Anxiety or Sleep. Indications: Feeling Anxious    dilTIAZem (CARDIZEM) 30 MG tablet   No No    Take 1 tablet by mouth Every 8 (Eight) Hours.    DULoxetine HCl 40 MG capsule delayed-release particles   Yes No    Take 1 capsule by mouth Daily. Indications: Major Depressive Disorder    ferrous sulfate 324 (65 Fe) MG tablet delayed-release EC tablet   No No    Take 1 tablet by mouth Daily With Breakfast.    furosemide (LASIX) 40 MG tablet  Self No No    Take 1 tablet by mouth Daily.    HYDROcodone-acetaminophen (NORCO) 7.5-325 MG per tablet   Yes No    Take 1 tablet by mouth Every 6 (Six) Hours As Needed for Severe Pain. Indications: Pain    levothyroxine (SYNTHROID, LEVOTHROID) 50 MCG tablet   Yes No    Take 1 tablet by mouth Daily. Indications: Underactive Thyroid    O2 (OXYGEN)   Yes No    Inhale 3 L/min Continuous. Indications: Prevention of COPD Worsening    potassium chloride (KLOR-CON M20) 20 MEQ CR tablet   No No    Take 2 tablets by mouth Daily.    predniSONE (DELTASONE) 5 MG tablet   Yes No    Take 1 tablet by mouth Daily. Indications: ACUTE EXACERBATION OF COPD (INACTIVE)    saccharomyces boulardii (FLORASTOR) 250 MG capsule  Self No No    Take 1 capsule by mouth 2 (Two) Times a Day.    Patient not taking:  Reported on 7/11/2024    tacrolimus (PROGRAF) 0.5 MG capsule   No No    Take 1 capsule by mouth 2 (Two) Times a Day.              Allergies:  Allergies   Allergen Reactions   • Zolpidem Other (See Comments), Unknown (See Comments) and Unknown - High Severity     KEPT HER AWAKE  KEPT HER AWAKE  KEPT HER AWAKE  KEPT HER AWAKE         Objective      Vitals:   Temp:  [97.3 °F (36.3 °C)-99 °F (37.2 °C)] 97.3 °F (36.3 °C)  Heart Rate:  [106-145] 128  Resp:  [15-25] 18  BP: (110-119)/(51-71) 119/71  Body mass index is 28.82 kg/m².  Physical Exam  General Appearance:  Alert, cooperative, no distress,  appears stated age  Head:  Normocephalic, without obvious abnormality, atraumatic  Eyes:  PERRL, conjunctiva/corneas clear, EOM's intact, fundi benign, both eyes  Ears:  Normal TM's and external ear canals, both ears  Nose: Nares normal, septum midline, mucosa normal, no drainage or sinus tenderness  Throat: Lips, mucosa, and tongue normal; teeth and gums normal  Neck: Supple, symmetrical, trachea midline, no adenopathy, thyroid: not enlarged, symmetric, no tenderness/mass/nodules, no carotid bruit or JVD  Lungs:   Clear to auscultation bilaterally, respirations unlabored  Heart:  Regular rate and rhythm, S1, S2 normal, no murmur, rub or gallop  Abdomen:  Soft, non-tender, bowel sounds active all four quadrants,  no masses, no organomegaly  Extremities: RUE nonpitting edema with nonfunctioning AV fistula, enlargement of right upper arm veins  Pulses: 2+ and symmetric  Skin: Skin color, texture, turgor normal, no rashes or lesions  Neurologic: Normal      Diagnostic Data:  Lab Results (last 24 hours)       Procedure Component Value Units Date/Time    Basic Metabolic Panel [[id number]]  (Abnormal) Collected: 11/29/24 1307    Specimen: Blood Updated: 11/29/24 1337     Glucose 102 mg/dL      BUN 13 mg/dL      Creatinine 0.71 mg/dL      Sodium 137 mmol/L      Potassium 4.9 mmol/L      Chloride 100 mmol/L      CO2 29.1 mmol/L      Calcium 9.4 mg/dL      BUN/Creatinine Ratio 18.3     Anion Gap 7.9 mmol/L      eGFR 87.7 mL/min/1.73     Narrative:      GFR Normal >60  Chronic Kidney Disease <60  Kidney Failure <15    The GFR formula is only valid for adults with stable renal function between ages 18 and 70.    TSH [[id number]]  (Abnormal) Collected: 11/29/24 1307    Specimen: Blood Updated: 11/29/24 1337     TSH 17.200 uIU/mL     Magnesium [[id number]]  (Normal) Collected: 11/29/24 1307    Specimen: Blood Updated: 11/29/24 1337     Magnesium 1.9 mg/dL     Protime-INR [[id number]]  (Abnormal) Collected: 11/29/24 1307    Specimen:  Blood Updated: 11/29/24 1331     Protime 24.7 Seconds      INR 2.22    CBC & Differential [[id number]]  (Abnormal) Collected: 11/29/24 1307    Specimen: Blood Updated: 11/29/24 1327    Narrative:      The following orders were created for panel order CBC & Differential.  Procedure                               Abnormality         Status                     ---------                               -----------         ------                     CBC Auto Differential[[id number]]        Abnormal            Final result                 Please view results for these tests on the individual orders.    CBC Auto Differential [[id number]]  (Abnormal) Collected: 11/29/24 1307    Specimen: Blood Updated: 11/29/24 1327     WBC 17.75 10*3/mm3      RBC 3.44 10*6/mm3      Hemoglobin 8.9 g/dL      Hematocrit 31.6 %      MCV 91.9 fL      MCH 25.9 pg      MCHC 28.2 g/dL      RDW 18.6 %      RDW-SD 61.8 fl      MPV 9.2 fL      Platelets 169 10*3/mm3      Neutrophil % 89.3 %      Lymphocyte % 4.3 %      Monocyte % 5.4 %      Eosinophil % 0.0 %      Basophil % 0.2 %      Immature Grans % 0.8 %      Neutrophils, Absolute 15.85 10*3/mm3      Lymphocytes, Absolute 0.76 10*3/mm3      Monocytes, Absolute 0.96 10*3/mm3      Eosinophils, Absolute 0.00 10*3/mm3      Basophils, Absolute 0.04 10*3/mm3      Immature Grans, Absolute 0.14 10*3/mm3      nRBC 0.0 /100 WBC     aPTT [[id number]]  (Abnormal) Collected: 11/29/24 1307    Specimen: Blood Updated: 11/29/24 1326     PTT 36.2 seconds              Imaging Results (Last 24 Hours)       Procedure Component Value Units Date/Time    XR Chest 1 View [[id number]] Collected: 11/29/24 1338     Updated: 11/29/24 1341    Narrative:      XR CHEST 1 VW    Date of Exam: 11/29/2024 1:34 PM EST    Indication: tachycardia, mild dyspnea; no infectious symptoms    Comparison: None available.    Findings:  Heart size is mildly enlarged but stable. Airspace disease is suggested in the retrocardiac left lower lobe  obscuring the diaphragmatic margin, probably not significantly changed in the interval. Benign calcified nodule is present in the left apex. Trace   bibasilar pleural effusions are present in the costophrenic angles. Thoracic aortic calcific atherosclerosis is present.      Impression:      Impression:  1.  Persistent retrocardiac left lower lobe airspace disease may represent atelectasis or pneumonia. 2.  No new airspace disease is identified.  2. Probable trace bibasilar pleural effusions.  3. Stable cardiomegaly.      Electronically Signed: Karie Bustos MD    11/29/2024 1:39 PM EST    Workstation ID: [id number]              Assessment & Plan        This is a 79 y.o. female with:    Active and Resolved Problems  Active Hospital Problems    Diagnosis  POA   • **Acute deep vein thrombosis (DVT) of other vein of right upper extremity [I82.621]  Yes      Resolved Hospital Problems   No resolved problems to display.       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  -Nephrology consult    Hypothyroidism  -Continue Synthroid            VTE Prophylaxis:  Pharmacologic VTE prophylaxis orders are present.        The patient desires to be as follows:    CODE STATUS:             Admission Status:  I believe this patient meets inpatient status.    Expected Length of Stay: Greater than 2 midnights    PDMP and Medication Dispenses via Sidebar reviewed  and consistent with patient reported medications.    I discussed the patient's findings and my recommendations with patient.      Signature:     This document has been electronically signed by Hernesto Snyder MD on November 29, 2024 17:28 [facility] Team

## 2024-11-29 NOTE — CASE MANAGEMENT/SOCIAL WORK
"Discharge Planning Assessment   [place]     Patient Name: Jaja Carcamo  MRN: [id number]  Today's Date: 11/29/2024    Admit Date: 11/29/2024    Plan: Return to [facility], currently wears continuous 02   Discharge Needs Assessment       Row Name 11/29/24 1517       Living Environment    People in Home alone    Current Living Arrangements extended care facility  [facility]    Potentially Unsafe Housing Conditions none    In the past 12 months has the electric, gas, oil, or water company threatened to shut off services in your home? No    Primary Care Provided by other (see comments)  [facility] -staff and son    Provides Primary Care For no one    Family Caregiver if Needed child(katarzyna), adult    Family Caregiver Names Aris \"Harish\" son    Quality of Family Relationships helpful;involved;supportive    Able to Return to Prior Arrangements yes       Resource/Environmental Concerns    Transportation Concerns none       Transportation Needs    In the past 12 months, has lack of transportation kept you from medical appointments or from getting medications? no       Food Insecurity    Within the past 12 months, you worried that your food would run out before you got the money to buy more. Never true    Within the past 12 months, the food you bought just didn't last and you didn't have money to get more. Never true       Transition Planning    Patient/Family Anticipates Transition to inpatient rehabilitation facility    Patient/Family Anticipated Services at Transition skilled nursing    Transportation Anticipated family or friend will provide  son or ambulance       Discharge Needs Assessment    Equipment Currently Used at Home wheelchair;walker, rolling                   Discharge Plan       Row Name 11/29/24 6182       Plan    Plan Return to [facility]    Plan Comments Spoke with patient at bedside, plans to return to [facility] upon discharge. Confirmed PCP and Pharmacy. Son can transport back if " available,may need ambulance. DC Barrier: Vascular Surgery consult                  Continued Care and Services - Admitted Since 11/29/2024       Destination       Service Provider Request Status Services Address Phone Fax Patient Preferred    [address] [facility] Pending - Request Sent -- [address] , [place] [address] [phone] --                     Demographic Summary       Row Name 11/29/24 151       General Information    Admission Type other (see comments)  currently no status    Arrived From long-term care    Referral Source patient    Reason for Consult discharge planning    Preferred Language English                   Functional Status       Row Name 11/29/24 1517       Functional Status    Usual Activity Tolerance fair    Current Activity Tolerance fair       Functional Status, IADL    Medications assistive equipment and person    Meal Preparation assistive equipment and person    Housekeeping assistive equipment and person    Laundry assistive equipment and person       Mental Status    General Appearance WDL WDL           Met with patient in room wearing PPE: mask, face shield/goggles, gloves, gown.      Maintained distance greater than six feet and spent less than 15 minutes in the room.     Aggie Weber RN

## 2024-11-29 NOTE — LETTER
EMS Transport Request  For use at [facility], [place], [place], [place], and [place] only   Patient Name: Jaja Carcamo : 1945   Weight:81.8 kg (180 lb 5.4 oz) [facility]   Insurance: [employer] MEDICARE REPLACEMENT Auth End Date: 2024   Pre-Cert #: D/C Summary complete:    Destination: [facility]   Contact Precautions: Other Contact isoloation for history of ESBL in urine   Equipment (O2, Fluids, etc.): O2, settings 2L   Arrive By Date/Time:   2024 Stretcher/WC: Stretcher   CM Requesting: Sofie Roman RN Ext: [phone]   Notes/Medical Necessity: BedRidden, Coccyx wound, oxygen 2L HD patient.     ______________________________________________________________________    *Only 2 patient bags OR 1 carry-on size bag are permitted.  Wheelchairs and walkers CANNOT transported with the patient. Acknowledge: Yes

## 2024-11-29 NOTE — ED PROVIDER NOTES
Subjective   History of Present Illness  Chief complaint: Right arm pain      Context: Patient is a 79-year-old female past medical history significant for COPD hyperlipidemia kidney transplant paroxysmal A-fib on anticoagulation and beta-blockers and Wegener's who comes in by EMS from [facility] complaints of right arm pain.  She states she is a fall risk and intermittently falls but does not think she struck her right arm.  She denies any paresthesias or weakness.  Denies any urinary complaints vomiting or diarrhea.  She denies any chest pain shortness of breath or palpitations.  She is on chronic oxygen And states she has not had to increase her use.  She does not think she was given her morning medications prior to being transferred to the ER.           PCP: rodo        Review of Systems   Constitutional:  Negative for fever.       Past Medical History:   Diagnosis Date    Allergic rhinitis 04/14/2015    Asthma 08/10/2017    Atrial flutter 05/11/2017    Chronic diarrhea 04/15/2019    Chronic hypoxemic respiratory failure 01/18/2024    Chronic pain 02/03/2015    COPD (chronic obstructive pulmonary disease)     COVID-19 virus detected 08/11/2022    Cytokine release syndrome, grade 1 08/16/2022    Edema of both lower extremities due to peripheral venous insufficiency 03/12/2021    ESRD on hemodialysis 05/22/2013    Essential (primary) hypertension 03/03/2022    Fracture of ulnar styloid 05/19/2022    Fracture of unspecified carpal bone, right wrist, subsequent encounter for fracture with routine healing 03/03/2022    Gastroesophageal reflux disease 11/12/2020    Gout 08/10/2017    Hearing loss 08/10/2017    History of appendectomy     History of DVT (deep vein thrombosis) 11/12/2020    History of kidney transplant 06/30/2017    History of lobectomy of lung 01/18/2024 03/08/2016:  RIGHT Lower Lobe Mass--> Right Video-assisted thoracoscopy with a moderate-to-large wedge resection of the RLL (by Dr. Haris PENNINGTON  Jayesh @ [facility])--> Poorly differentiated carcinoma of the RLL.      History of repair of hip joint 05/21/2013    History of suicide attempt 05/20/2024    Hyperlipidemia 08/11/2014    Hypothyroidism, unspecified 03/03/2022    Infection due to extended spectrum beta lactamase (ESBL) producing bacteria 03/11/2016    No A2K system hx. +ESBL E coli urine on 3/11/16.      Irritable bowel syndrome 04/15/2019    Long term (current) use of immunosuppressive biologic 04/28/2021    Long term current use of anticoagulant therapy 01/18/2024    Malignant neoplasm of lung 08/10/2017    Menopausal flushing 08/30/2021    Mitral valve regurgitation 07/06/2015    Mixed anxiety and depressive disorder 04/30/2015    Non-small cell lung cancer 08/10/2017    Nonobstructive atherosclerosis of coronary artery 06/02/2019 08/12/2022: CATH: Prashant: NSTEMI assoc with Covid-19: LM:-nl;  LAD: diffuse, Ca++; 30%; CIRC: Dominant. Normal; RCA: small; 50% diffuse, proximal and mid-segment.      NSTEMI (non-ST elevated myocardial infarction) 08/11/2022    Obsessive-compulsive disorder 04/15/2019    Osteoarthritis 05/20/2024    Paroxysmal atrial fibrillation 05/11/2017    Paroxysmal supraventricular tachycardia 05/11/2017    Peripheral neuropathy 08/11/2014    Personal history of peptic ulcer disease 11/12/2020    Postoperative anemia due to acute blood loss 05/22/2013    Right wrist fracture 03/01/2022    Seasonal allergies 08/10/2017    Secondary hyperparathyroidism of renal origin 09/14/2015    Tricuspid valve regurgitation 03/15/2017    Ulcer of lower extremity 08/30/2021    Unspecified acute appendicitis 03/03/2022    Valvular heart disease 05/20/2024    Wegener's granulomatosis with renal involvement 03/03/2022       Allergies   Allergen Reactions    Zolpidem Other (See Comments), Unknown (See Comments) and Unknown - High Severity     KEPT HER AWAKE  KEPT HER AWAKE  KEPT HER AWAKE  KEPT HER AWAKE         Past Surgical  History:   Procedure Laterality Date    APPENDECTOMY      BREAST SURGERY Left     cysts rmeoved    CARDIAC CATHETERIZATION Right 08/12/2022    Procedure: Left Heart Cath and coronary angiogram;  Surgeon: Jak Gavin MD;  Location: [facility] CATH INVASIVE LOCATION;  Service: Cardiology;  Laterality: Right;    CLOSED REDUCTION WRIST FRACTURE Right 03/01/2022    Procedure: WRIST CLOSED REDUCTION;  Surgeon: Gaudencio Townsend MD;  Location: [facility] MAIN OR;  Service: Orthopedics;  Laterality: Right;    CYSTOSCOPY      HIP ARTHROPLASTY      HYSTERECTOMY      LUNG LOBECTOMY Right     TRANSPLANTATION RENAL         Family History   Problem Relation Age of Onset    No Known Problems Mother     No Known Problems Father        Social History     Socioeconomic History    Marital status:    Tobacco Use    Smoking status: Former     Passive exposure: Never    Smokeless tobacco: Never   Vaping Use    Vaping status: Former   Substance and Sexual Activity    Alcohol use: Never    Drug use: Never    Sexual activity: Defer           Objective   Physical Exam    Vital signs and triage nurse note reviewed.  Constitutional: Awake, alert; nontoxic in appearance.  HEENT: Normocephalic, atraumatic;  with intact EOM; oropharynx is pink and moist without exudate or erythema.  Neck: Supple, full range of motion without pain;   Cardiovascular: Tachycardic in the 170s  Pulmonary: Respiratory effort regular nonlabored, breath sounds bilateral lower lobes  Abdomen: Soft, nontender nondistended with normoactive bowel sounds; no rebound or guarding.  Musculoskeletal: Independent range of motion of all extremities.  Lymphedema and wraps to the bilateral lower extremities.  Right upper extremity is edematous, +3 radial pulse.  Intact sensation.  She does have a AV fistula with bruit  Neuro: Alert oriented x3, speech is clear and appropriate, GCS 15  Skin:  Fleshtone warm, dry, intact; no erythematous or petechial rash or lesion      Procedures            ED Course  ED Course as of 11/29/24 1534   Fri Nov 29, 2024   1359 Discussed findings with  vascular. [JW]   1507 [facility] states patient is no longer theirs [JW]   1521 Spoke with dr rodriguez [JW]      ED Course User Index  [JW] Michelle Altamirano APRN                                           Labs Reviewed   PROTIME-INR - Abnormal; Notable for the following components:       Result Value    Protime 24.7 (*)     INR 2.22 (*)     All other components within normal limits   APTT - Abnormal; Notable for the following components:    PTT 36.2 (*)     All other components within normal limits   BASIC METABOLIC PANEL - Abnormal; Notable for the following components:    Glucose 102 (*)     CO2 29.1 (*)     All other components within normal limits    Narrative:     GFR Normal >60  Chronic Kidney Disease <60  Kidney Failure <15    The GFR formula is only valid for adults with stable renal function between ages 18 and 70.   TSH - Abnormal; Notable for the following components:    TSH 17.200 (*)     All other components within normal limits   CBC WITH AUTO DIFFERENTIAL - Abnormal; Notable for the following components:    WBC 17.75 (*)     RBC 3.44 (*)     Hemoglobin 8.9 (*)     Hematocrit 31.6 (*)     MCH 25.9 (*)     MCHC 28.2 (*)     RDW 18.6 (*)     RDW-SD 61.8 (*)     Neutrophil % 89.3 (*)     Lymphocyte % 4.3 (*)     Eosinophil % 0.0 (*)     Immature Grans % 0.8 (*)     Neutrophils, Absolute 15.85 (*)     Monocytes, Absolute 0.96 (*)     Immature Grans, Absolute 0.14 (*)     All other components within normal limits   MAGNESIUM - Normal   CBC AND DIFFERENTIAL    Narrative:     The following orders were created for panel order CBC & Differential.  Procedure                               Abnormality         Status                     ---------                               -----------         ------                     CBC Auto Differential[[id number]]        Abnormal            Final result                 Please view  results for these tests on the individual orders.     Medications   sodium chloride 0.9 % flush 10 mL (has no administration in time range)   metoprolol tartrate (LOPRESSOR) injection 5 mg (5 mg Intravenous Given 11/29/24 1322)       XR Chest 1 View    Result Date: 11/29/2024  Impression: 1.  Persistent retrocardiac left lower lobe airspace disease may represent atelectasis or pneumonia. 2.  No new airspace disease is identified. 2. Probable trace bibasilar pleural effusions. 3. Stable cardiomegaly. Electronically Signed: Karie Bustos MD  11/29/2024 1:39 PM EST  Workstation ID: [id number]   Prior to Admission medications    Medication Sig Start Date End Date Taking? Authorizing Provider   acetaminophen (Tylenol) 325 MG tablet Take 2 tablets by mouth Every 4 (Four) Hours As Needed for Fever or Mild Pain. Indications: Pain 3/13/20   Court Vences MD   apixaban (ELIQUIS) 5 MG tablet tablet Take 1 tablet by mouth Every 12 (Twelve) Hours. 8/16/22   Clyde White DO   cholestyramine light 4 g packet Take 1 packet by mouth Every 12 (Twelve) Hours.  Patient not taking: Reported on 7/11/2024 6/13/24   Janene Archer APRN   colestipol (Colestid) 1 g tablet Take 1 g by mouth Daily. Indications: High Amount of Cholesterol in the Blood 7/11/24   Court Vences MD   diazePAM (Valium) 5 MG tablet Take 5 mg by mouth Every 12 (Twelve) Hours As Needed for Anxiety or Sleep. Indications: Feeling Anxious 7/11/24   Court Vences MD   dilTIAZem (CARDIZEM) 30 MG tablet Take 1 tablet by mouth Every 8 (Eight) Hours. 6/13/24   Janene Archer APRN   DULoxetine HCl 40 MG capsule delayed-release particles Take 1 capsule by mouth Daily. Indications: Major Depressive Disorder 7/11/24   Court Vences MD   ferrous sulfate 324 (65 Fe) MG tablet delayed-release EC tablet Take 1 tablet by mouth Daily With Breakfast. 5/23/24   Mariangel Stearns APRN   furosemide (LASIX) 40 MG tablet Take 1 tablet by mouth  "Daily. 6/14/24   Janene Archer APRN   HYDROcodone-acetaminophen (NORCO) 7.5-325 MG per tablet Take 1 tablet by mouth Every 6 (Six) Hours As Needed for Severe Pain. Indications: Pain 7/11/24   Court Vences MD   levothyroxine (SYNTHROID, LEVOTHROID) 50 MCG tablet Take 1 tablet by mouth Daily. Indications: Underactive Thyroid 7/11/24   Court Vences MD   O2 (OXYGEN) Inhale 3 L/min Continuous. Indications: Prevention of COPD Worsening 7/11/24   Court Vences MD   potassium chloride (KLOR-CON M20) 20 MEQ CR tablet Take 2 tablets by mouth Daily. 5/22/24   Mariangel Stearns APRN   predniSONE (DELTASONE) 5 MG tablet Take 1 tablet by mouth Daily. Indications: ACUTE EXACERBATION OF COPD (INACTIVE) 7/11/24   Provider, Historical, MD   saccharomyces boulardii (FLORASTOR) 250 MG capsule Take 1 capsule by mouth 2 (Two) Times a Day.  Patient not taking: Reported on 7/11/2024 6/13/24   Janene Archer APRN   tacrolimus (PROGRAF) 0.5 MG capsule Take 1 capsule by mouth 2 (Two) Times a Day. 6/13/24   Janene Archer APRN                   Medical Decision Making      /51   Pulse (!) 129   Temp 99 °F (37.2 °C) (Oral)   Resp 20   Ht 167.6 cm (66\")   Wt 74.5 kg (164 lb 3.9 oz)   SpO2 96%   BMI 26.51 kg/m²         Radiology interpretation:  X-rays reviewed and interpreted by Arben: Unchanged from previous, she has no productive cough or fever consistent with pneumonia.  Vascular ultrasound reported to have right IJV DVT  Further interpretation by radiologist as above  Lab interpretation:  Labs all viewed by me and significant for, INR 2.2, glucose 102, TSH 17, hemoglobin 8.9    EKG viewed and interpreted by Dr. hammond  , A-fib   comparison: 6/5/2024 A-fib rate of 123            Appropriate PPE worn during exam.  Discussed care with: Dr Lillian Cartagena     Patient was placed on a cardiac monitor had an IV established labs x-ray ultrasound obtained to evaluate for electrolyte " abnormalities DVT pulmonary edema.  Notably she has not had her 3 times daily beta-blocker today and was given a dose of Lopressor with improvement in her heart rate into the 120s.  She continues to deny any chest pain or shortness of breath.  Patient is already on twice daily anticoagulation INR was 2.2 therefore that will be deferred to the admitting team at this time.  I discussed her acute DVT with Dr. Alva with [facility].  Patient will be admitted to the hospitalist.  Based on the clinical findings at this time I anticipate the patient will require a 2 midnight stay        Patient was seen by Dr. Theodore while in the ER    Problems Addressed:  Arm DVT (deep venous thromboembolism), acute, right: complicated acute illness or injury  Atrial fibrillation with RVR: complicated acute illness or injury  A-V fistula: complicated acute illness or injury    Amount and/or Complexity of Data Reviewed  Labs: ordered.  Radiology: ordered.  ECG/medicine tests: ordered.    Risk  Prescription drug management.  Decision regarding hospitalization.        Final diagnoses:   Atrial fibrillation with RVR   A-V fistula   Arm DVT (deep venous thromboembolism), acute, right       ED Disposition  ED Disposition       ED Disposition   Decision to Admit    Condition   --    Comment   Level of Care: Telemetry [5]   Admitting Physician: XIOMARA MILLS [[id number]]   Attending Physician: XIOMARA MILLS [[id number]]   Bed Request Comments: cardizem gtt                 No follow-up provider specified.       Medication List      No changes were made to your prescriptions during this visit.            Michelle Altamirano, APRN  11/29/24 1534

## 2024-11-29 NOTE — PLAN OF CARE
Goal Outcome Evaluation:              Outcome Evaluation: Pt admitted to the floor.

## 2024-11-30 PROBLEM — I82.409 ACUTE DVT (DEEP VENOUS THROMBOSIS): Status: ACTIVE | Noted: 2024-11-30

## 2024-11-30 LAB
ANION GAP SERPL CALCULATED.3IONS-SCNC: 7.2 MMOL/L (ref 5–15)
APTT PPP: 47.5 SECONDS (ref 22.7–35.4)
APTT PPP: 81.1 SECONDS (ref 22.7–35.4)
APTT PPP: 86.2 SECONDS (ref 22.7–35.4)
BASOPHILS # BLD AUTO: 0.03 10*3/MM3 (ref 0–0.2)
BASOPHILS NFR BLD AUTO: 0.2 % (ref 0–1.5)
BUN SERPL-MCNC: 16 MG/DL (ref 8–23)
BUN/CREAT SERPL: 21.9 (ref 7–25)
C AURIS DNA SPEC QL NAA+NON-PROBE: NOT DETECTED
CALCIUM SPEC-SCNC: 9 MG/DL (ref 8.6–10.5)
CHLORIDE SERPL-SCNC: 101 MMOL/L (ref 98–107)
CO2 SERPL-SCNC: 27.8 MMOL/L (ref 22–29)
CREAT SERPL-MCNC: 0.73 MG/DL (ref 0.57–1)
DEPRECATED RDW RBC AUTO: 61.3 FL (ref 37–54)
EGFRCR SERPLBLD CKD-EPI 2021: 84.8 ML/MIN/1.73
EOSINOPHIL # BLD AUTO: 0.04 10*3/MM3 (ref 0–0.4)
EOSINOPHIL NFR BLD AUTO: 0.3 % (ref 0.3–6.2)
ERYTHROCYTE [DISTWIDTH] IN BLOOD BY AUTOMATED COUNT: 18.1 % (ref 12.3–15.4)
GLUCOSE SERPL-MCNC: 120 MG/DL (ref 65–99)
HCT VFR BLD AUTO: 28.1 % (ref 34–46.6)
HGB BLD-MCNC: 7.7 G/DL (ref 12–15.9)
IMM GRANULOCYTES # BLD AUTO: 0.07 10*3/MM3 (ref 0–0.05)
IMM GRANULOCYTES NFR BLD AUTO: 0.6 % (ref 0–0.5)
INR PPP: 1.91 (ref 0.9–1.1)
LYMPHOCYTES # BLD AUTO: 0.83 10*3/MM3 (ref 0.7–3.1)
LYMPHOCYTES NFR BLD AUTO: 6.8 % (ref 19.6–45.3)
MAGNESIUM SERPL-MCNC: 1.9 MG/DL (ref 1.6–2.4)
MCH RBC QN AUTO: 25.5 PG (ref 26.6–33)
MCHC RBC AUTO-ENTMCNC: 27.4 G/DL (ref 31.5–35.7)
MCV RBC AUTO: 93 FL (ref 79–97)
MONOCYTES # BLD AUTO: 0.68 10*3/MM3 (ref 0.1–0.9)
MONOCYTES NFR BLD AUTO: 5.6 % (ref 5–12)
NEUTROPHILS NFR BLD AUTO: 10.5 10*3/MM3 (ref 1.7–7)
NEUTROPHILS NFR BLD AUTO: 86.5 % (ref 42.7–76)
NRBC BLD AUTO-RTO: 0 /100 WBC (ref 0–0.2)
PLATELET # BLD AUTO: 158 10*3/MM3 (ref 140–450)
PMV BLD AUTO: 9.8 FL (ref 6–12)
POTASSIUM SERPL-SCNC: 4.2 MMOL/L (ref 3.5–5.2)
PROTHROMBIN TIME: 22 SECONDS (ref 11.7–14.2)
RBC # BLD AUTO: 3.02 10*6/MM3 (ref 3.77–5.28)
SODIUM SERPL-SCNC: 136 MMOL/L (ref 136–145)
WBC NRBC COR # BLD AUTO: 12.15 10*3/MM3 (ref 3.4–10.8)

## 2024-11-30 PROCEDURE — 85610 PROTHROMBIN TIME: CPT | Performed by: INTERNAL MEDICINE

## 2024-11-30 PROCEDURE — P9047 ALBUMIN (HUMAN), 25%, 50ML: HCPCS | Performed by: INTERNAL MEDICINE

## 2024-11-30 PROCEDURE — 25010000002 HEPARIN (PORCINE) 25000-0.45 UT/250ML-% SOLUTION: Performed by: INTERNAL MEDICINE

## 2024-11-30 PROCEDURE — 63710000001 TACROLIMUS PER 1 MG: Performed by: INTERNAL MEDICINE

## 2024-11-30 PROCEDURE — 25010000002 ALBUMIN HUMAN 25% PER 50 ML: Performed by: INTERNAL MEDICINE

## 2024-11-30 PROCEDURE — 80048 BASIC METABOLIC PNL TOTAL CA: CPT | Performed by: INTERNAL MEDICINE

## 2024-11-30 PROCEDURE — 25010000002 PROCHLORPERAZINE 10 MG/2ML SOLUTION: Performed by: INTERNAL MEDICINE

## 2024-11-30 PROCEDURE — 83735 ASSAY OF MAGNESIUM: CPT | Performed by: INTERNAL MEDICINE

## 2024-11-30 PROCEDURE — 85025 COMPLETE CBC W/AUTO DIFF WBC: CPT | Performed by: INTERNAL MEDICINE

## 2024-11-30 PROCEDURE — 85730 THROMBOPLASTIN TIME PARTIAL: CPT | Performed by: INTERNAL MEDICINE

## 2024-11-30 PROCEDURE — 80197 ASSAY OF TACROLIMUS: CPT | Performed by: INTERNAL MEDICINE

## 2024-11-30 PROCEDURE — 94799 UNLISTED PULMONARY SVC/PX: CPT

## 2024-11-30 PROCEDURE — 25010000002 ONDANSETRON PER 1 MG: Performed by: INTERNAL MEDICINE

## 2024-11-30 PROCEDURE — 99222 1ST HOSP IP/OBS MODERATE 55: CPT | Performed by: STUDENT IN AN ORGANIZED HEALTH CARE EDUCATION/TRAINING PROGRAM

## 2024-11-30 RX ORDER — ALBUMIN (HUMAN) 12.5 G/50ML
25 SOLUTION INTRAVENOUS ONCE
Status: COMPLETED | OUTPATIENT
Start: 2024-11-30 | End: 2024-11-30

## 2024-11-30 RX ORDER — ONDANSETRON 2 MG/ML
4 INJECTION INTRAMUSCULAR; INTRAVENOUS EVERY 6 HOURS PRN
Status: DISCONTINUED | OUTPATIENT
Start: 2024-11-30 | End: 2024-12-19 | Stop reason: HOSPADM

## 2024-11-30 RX ORDER — PROCHLORPERAZINE EDISYLATE 5 MG/ML
5 INJECTION INTRAMUSCULAR; INTRAVENOUS EVERY 6 HOURS PRN
Status: DISCONTINUED | OUTPATIENT
Start: 2024-11-30 | End: 2024-12-19 | Stop reason: HOSPADM

## 2024-11-30 RX ORDER — FAMOTIDINE 10 MG/ML
20 INJECTION, SOLUTION INTRAVENOUS EVERY 12 HOURS SCHEDULED
Status: DISCONTINUED | OUTPATIENT
Start: 2024-11-30 | End: 2024-12-02

## 2024-11-30 RX ADMIN — LORAZEPAM 0.5 MG: 0.5 TABLET ORAL at 10:31

## 2024-11-30 RX ADMIN — HYDROCODONE BITARTRATE AND ACETAMINOPHEN 1 TABLET: 7.5; 325 TABLET ORAL at 01:02

## 2024-11-30 RX ADMIN — METOPROLOL TARTRATE 50 MG: 50 TABLET, FILM COATED ORAL at 20:46

## 2024-11-30 RX ADMIN — QUETIAPINE FUMARATE 25 MG: 25 TABLET ORAL at 20:46

## 2024-11-30 RX ADMIN — HYDROCODONE BITARTRATE AND ACETAMINOPHEN 1 TABLET: 7.5; 325 TABLET ORAL at 17:10

## 2024-11-30 RX ADMIN — FUROSEMIDE 40 MG: 40 TABLET ORAL at 10:30

## 2024-11-30 RX ADMIN — LORAZEPAM 0.5 MG: 0.5 TABLET ORAL at 15:19

## 2024-11-30 RX ADMIN — CHOLESTYRAMINE 4 G: 4 POWDER, FOR SUSPENSION ORAL at 10:31

## 2024-11-30 RX ADMIN — DULOXETINE HYDROCHLORIDE 40 MG: 20 CAPSULE, DELAYED RELEASE ORAL at 10:29

## 2024-11-30 RX ADMIN — TACROLIMUS 0.5 MG: 0.5 CAPSULE ORAL at 10:31

## 2024-11-30 RX ADMIN — METOPROLOL TARTRATE 50 MG: 50 TABLET, FILM COATED ORAL at 12:33

## 2024-11-30 RX ADMIN — LEVOTHYROXINE SODIUM 50 MCG: 50 TABLET ORAL at 05:06

## 2024-11-30 RX ADMIN — ALBUMIN (HUMAN) 25 G: 0.25 INJECTION, SOLUTION INTRAVENOUS at 06:58

## 2024-11-30 RX ADMIN — Medication 10 ML: at 20:47

## 2024-11-30 RX ADMIN — SENNOSIDES AND DOCUSATE SODIUM 1 TABLET: 50; 8.6 TABLET ORAL at 10:30

## 2024-11-30 RX ADMIN — PROCHLORPERAZINE EDISYLATE 5 MG: 5 INJECTION INTRAMUSCULAR; INTRAVENOUS at 22:51

## 2024-11-30 RX ADMIN — LORAZEPAM 0.5 MG: 0.5 TABLET ORAL at 20:46

## 2024-11-30 RX ADMIN — HEPARIN SODIUM 22 UNITS/KG/HR: 10000 INJECTION, SOLUTION INTRAVENOUS at 11:30

## 2024-11-30 RX ADMIN — ONDANSETRON 4 MG: 2 INJECTION INTRAMUSCULAR; INTRAVENOUS at 01:02

## 2024-11-30 RX ADMIN — Medication 10 ML: at 10:32

## 2024-11-30 RX ADMIN — HYDROXYZINE HYDROCHLORIDE 25 MG: 25 TABLET ORAL at 20:46

## 2024-11-30 RX ADMIN — SENNOSIDES AND DOCUSATE SODIUM 1 TABLET: 50; 8.6 TABLET ORAL at 20:46

## 2024-11-30 RX ADMIN — POTASSIUM CHLORIDE 10 MEQ: 750 TABLET, EXTENDED RELEASE ORAL at 10:31

## 2024-11-30 RX ADMIN — FAMOTIDINE 20 MG: 10 INJECTION INTRAVENOUS at 22:51

## 2024-11-30 RX ADMIN — HYDROCODONE BITARTRATE AND ACETAMINOPHEN 1 TABLET: 7.5; 325 TABLET ORAL at 22:51

## 2024-11-30 RX ADMIN — ONDANSETRON 4 MG: 2 INJECTION INTRAMUSCULAR; INTRAVENOUS at 20:22

## 2024-11-30 RX ADMIN — POLYETHYLENE GLYCOL 3350 17 G: 17 POWDER, FOR SOLUTION ORAL at 10:31

## 2024-11-30 RX ADMIN — HYDROCODONE BITARTRATE AND ACETAMINOPHEN 1 TABLET: 7.5; 325 TABLET ORAL at 10:38

## 2024-11-30 RX ADMIN — ATORVASTATIN CALCIUM 10 MG: 10 TABLET ORAL at 10:31

## 2024-11-30 RX ADMIN — TACROLIMUS 0.5 MG: 0.5 CAPSULE ORAL at 20:46

## 2024-11-30 RX ADMIN — MIRTAZAPINE 15 MG: 15 TABLET, FILM COATED ORAL at 20:46

## 2024-11-30 NOTE — PROGRESS NOTES
[facility] MEDICINE SERVICE  DAILY PROGRESS NOTE    NAME: Jaja Carcamo  : 1945  MRN: [id number]      LOS: 0 days     PROVIDER OF SERVICE: Khris Tomas MD    Chief Complaint: Acute deep vein thrombosis (DVT) of other vein of right upper extremity    Subjective:     Interval History:  History taken from: patient    History of Present Illness: Jaja Carcamo is a 79 y.o. female with a CMH of atrial flutter, COPD, previous history of ESRD now with renal transplant, hypertension who presents to the hospital with complaints of right arm swelling and pain.  The patient is a poor historian but was able to tell me that she has an AV fistula on her right arm for previous history of ESRD on dialysis.  However she received a renal transplant a few years ago and has not required hemodialysis since then.  She did notice that over the past few weeks she has had increasing right upper extremity pain and swelling that is extending all the way up to her right shoulder.  She is complaining of some enlargement of veins in her right upper arm and shoulder area.  She also complains of pain in that area.  She denies any shortness of breath, cough, congestion, fevers, chills.  RUE ultrasound in the ER did show extensive DVT of the right internal jugular vein.       seen in bed ROBERT YARBROUGH, ALITSAIR RN,    Review of Systems  10 point ROS note except for above  Objective:     Vital Signs  Temp:  [97.2 °F (36.2 °C)-98.3 °F (36.8 °C)] 97.2 °F (36.2 °C)  Heart Rate:  [] 100  Resp:  [14-25] 14  BP: ()/(47-75) 115/62  Flow (L/min) (Oxygen Therapy):  [2-3] 3   Body mass index is 28.82 kg/m².    Physical Exam  General Appearance:  Alert, cooperative, no distress, appears stated age  Head:   Normocephalic, without obvious abnormality, atraumatic  Eyes:   PERRL, conjunctiva/corneas clear, EOM's intact, fundi benign, both eyes  Ears:    Normal TM's and external ear canals, both ears  Nose:Nares normal, septum midline,  mucosa normal, no drainage or sinus tenderness  Throat:Lips, mucosa, and tongue normal; teeth and gums normal  Neck:Supple, symmetrical, trachea midline, no adenopathy, thyroid: not enlarged, symmetric, no tenderness/mass/nodules, no carotid bruit or JVD  Lungs:             Clear to auscultation bilaterally, respirations unlabored  Heart:  Regular rate and rhythm, S1, S2 normal, no murmur, rub or gallop  Abdomen:  Soft, non-tender, bowel sounds active all four quadrants,  no masses, no organomegaly  Extremities:RUE nonpitting edema with nonfunctioning AV fistula, enlargement of right upper arm veins  Pulses:2+ and symmetric  Skin:Skin color, texture, turgor normal, no rashes or lesions  Neurologic: Normal     Diagnostic Data    Results from last 7 days   Lab Units 11/30/24  0027   WBC 10*3/mm3 12.15*   HEMOGLOBIN g/dL 7.7*   HEMATOCRIT % 28.1*   PLATELETS 10*3/mm3 158   GLUCOSE mg/dL 120*   CREATININE mg/dL 0.73   BUN mg/dL 16   SODIUM mmol/L 136   POTASSIUM mmol/L 4.2   ANION GAP mmol/L 7.2       XR Chest 1 View    Result Date: 11/29/2024  Impression: 1.  Persistent retrocardiac left lower lobe airspace disease may represent atelectasis or pneumonia. 2.  No new airspace disease is identified. 2. Probable trace bibasilar pleural effusions. 3. Stable cardiomegaly. Electronically Signed: Karie Bustos MD  11/29/2024 1:39 PM EST  Workstation ID: [id number]       I reviewed the patient's new clinical results.    Assessment/Plan:     Active and Resolved Problems  Active Hospital Problems    Diagnosis  POA    **Acute deep vein thrombosis (DVT) of other vein of right upper extremity [I82.621]  Yes      Resolved Hospital Problems   No resolved problems to display.      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  Will plan for right arm fistulogram early next week.  Supportive care et al per primary team.      Atrial flutter with RVR  -Resume home diltiazem as patient has not taken her dose this morning  -Holding anticoagulation as above and initiation of heparin drip  -Use IV beta-blocker as necessary for heart rate control     COPD  -Stable  -Continue inhalers     History of renal transplant  -Continue Prograf and check Prograf level in the a.m.  -Nephrology consult     Hypothyroidism  -Continue Synthroid    VTE Prophylaxis:  Pharmacologic VTE prophylaxis orders are present.    Disposition Planning:   Barriers to Discharge:dvt  Anticipated Date of Discharge: 12/3  Place of Discharge: home      Time: 35 minutes     There are no questions and answers to display.       Signature: Electronically signed by Khris Tomas MD, 11/30/24, 13:43 EST.  [facility]ist Team

## 2024-11-30 NOTE — PLAN OF CARE
Goal Outcome Evaluation:               Patients PTT is at 86.2 with no change to the dose and will need a lab draw at 0600. Patient is compaining of pain to her right arm. Patient on a house diet and tolerating well. Per Vascular nothing scheduled for today and no NPO orders at this time for any procedures.

## 2024-11-30 NOTE — CONSULTS
[facility] Vascular Surgery  Consult Note    Patient Name: Jaja Carcamo  : 1945  MRN: [id number]  Primary Care Physician:  Kvng Guillen MD  Referring Physician: No Known Provider  Date of admission: 2024    Inpatient Vascular Surgery Consult  Consult performed by: Paulino Alva II, MD  Consult ordered by: Michelle Altamirano APRN        Subjective   Subjective     Reason for Consult/ Chief Complaint: Right arm swelling, right IJ DVT    History of Present Illness  Jaja Carcamo is a 79 y.o. female with end-stage renal disease previously on dialysis but now 8 years status post successful kidney transplant with right arm fistula created by Dr. Arango which she has not used in years presenting with several days of worsening right arm swelling.  The patient reports some soreness in her arm associated with this.  She denies ever having had this kind of arm swelling before.  She has atrial flutter and is on chronic anticoagulation with Eliquis for this.  She otherwise feels well with no acute complaints.    Review of Systems     Personal History     Past Medical History:   Diagnosis Date    Allergic rhinitis 2015    Asthma 08/10/2017    Atrial flutter 2017    Chronic diarrhea 04/15/2019    Chronic hypoxemic respiratory failure 2024    Chronic pain 2015    COPD (chronic obstructive pulmonary disease)     COVID-19 virus detected 2022    Cytokine release syndrome, grade 1 2022    Edema of both lower extremities due to peripheral venous insufficiency 2021    ESRD on hemodialysis 2013    Essential (primary) hypertension 2022    Fracture of ulnar styloid 2022    Fracture of unspecified carpal bone, right wrist, subsequent encounter for fracture with routine healing 2022    Gastroesophageal reflux disease 2020    Gout 08/10/2017    Hearing loss 08/10/2017    History of appendectomy     History of DVT (deep vein thrombosis)  11/12/2020    History of kidney transplant 06/30/2017    History of lobectomy of lung 01/18/2024 03/08/2016:  RIGHT Lower Lobe Mass--> Right Video-assisted thoracoscopy with a moderate-to-large wedge resection of the RLL (by Dr. Haris Mcconnell @ [facility])--> Poorly differentiated carcinoma of the RLL.      History of repair of hip joint 05/21/2013    History of suicide attempt 05/20/2024    Hyperlipidemia 08/11/2014    Hypothyroidism, unspecified 03/03/2022    Infection due to extended spectrum beta lactamase (ESBL) producing bacteria 03/11/2016    No A2K system hx. +ESBL E coli urine on 3/11/16.      Irritable bowel syndrome 04/15/2019    Long term (current) use of immunosuppressive biologic 04/28/2021    Long term current use of anticoagulant therapy 01/18/2024    Malignant neoplasm of lung 08/10/2017    Menopausal flushing 08/30/2021    Mitral valve regurgitation 07/06/2015    Mixed anxiety and depressive disorder 04/30/2015    Non-small cell lung cancer 08/10/2017    Nonobstructive atherosclerosis of coronary artery 06/02/2019 08/12/2022: CATH: Prashant: NSTEMI assoc with Covid-19: LM:-nl;  LAD: diffuse, Ca++; 30%; CIRC: Dominant. Normal; RCA: small; 50% diffuse, proximal and mid-segment.      NSTEMI (non-ST elevated myocardial infarction) 08/11/2022    Obsessive-compulsive disorder 04/15/2019    Osteoarthritis 05/20/2024    Paroxysmal atrial fibrillation 05/11/2017    Paroxysmal supraventricular tachycardia 05/11/2017    Peripheral neuropathy 08/11/2014    Personal history of peptic ulcer disease 11/12/2020    Postoperative anemia due to acute blood loss 05/22/2013    Right wrist fracture 03/01/2022    Seasonal allergies 08/10/2017    Secondary hyperparathyroidism of renal origin 09/14/2015    Tricuspid valve regurgitation 03/15/2017    Ulcer of lower extremity 08/30/2021    Unspecified acute appendicitis 03/03/2022    Valvular heart disease 05/20/2024    Wegener's granulomatosis with renal  involvement 03/03/2022       Past Surgical History:   Procedure Laterality Date    APPENDECTOMY      BREAST SURGERY Left     cysts rmeoved    CARDIAC CATHETERIZATION Right 08/12/2022    Procedure: Left Heart Cath and coronary angiogram;  Surgeon: Jak Gavin MD;  Location: [facility] CATH INVASIVE LOCATION;  Service: Cardiology;  Laterality: Right;    CLOSED REDUCTION WRIST FRACTURE Right 03/01/2022    Procedure: WRIST CLOSED REDUCTION;  Surgeon: Gaudencio Townsend MD;  Location: [facility] MAIN OR;  Service: Orthopedics;  Laterality: Right;    CYSTOSCOPY      HIP ARTHROPLASTY      HYSTERECTOMY      LUNG LOBECTOMY Right     TRANSPLANTATION RENAL         Family History: Her family history includes No Known Problems in her father and mother.     Social History: She  reports that she has quit smoking. She has never been exposed to tobacco smoke. She has never used smokeless tobacco. She reports that she does not drink alcohol and does not use drugs.    Home Medications:  DULoxetine HCl, HYDROcodone-acetaminophen, LORazepam, O2, QUEtiapine, acetaminophen, albuterol sulfate HFA, apixaban, colestipol, diazePAM, furosemide, hydrOXYzine, levothyroxine, metoprolol tartrate, mirtazapine, ondansetron, polyethylene glycol, potassium chloride, predniSONE, sennosides-docusate, simvastatin, tacrolimus, vitamin B-12, and vitamin D    Allergies:  She is allergic to zolpidem.    Objective    Objective     Vitals:    Temp:  [97.3 °F (36.3 °C)-99 °F (37.2 °C)] 98.3 °F (36.8 °C)  Heart Rate:  [] 100  Resp:  [15-25] 18  BP: ()/(47-75) 113/62  Flow (L/min) (Oxygen Therapy):  [2-4] 3    Physical Exam  NAD  Respirations unlabored  Old and chronically ill appearing  Right arm diffusely edematous. Prominent superficial veins noted.   Right arm fistula with pulsatile thrill  Palpable right radial pulse  No significant edema in left arm    Result Review    Result Review:  I have personally reviewed the results from the time of this admission to  "11/30/2024 11:10 EST and agree with these findings:  [x]  Laboratory list / accordion  [x]  Microbiology  [x]  Radiology  []  EKG/Telemetry   [x]  Cardiology/Vascular   []  Pathology  []  Old records  []  Other:  Most notable findings include: leukocytosis with WBC 12k, Hgb 7.7, Cr 0.73. INR 2.2      CBC    Results from last 7 days   Lab Units 11/30/24  0027 11/29/24  1307   WBC 10*3/mm3 12.15* 17.75*   HEMOGLOBIN g/dL 7.7* 8.9*   PLATELETS 10*3/mm3 158 169     BMP   Results from last 7 days   Lab Units 11/30/24  0027 11/29/24  1307   SODIUM mmol/L 136 137   POTASSIUM mmol/L 4.2 4.9   CHLORIDE mmol/L 101 100   CO2 mmol/L 27.8 29.1*   BUN mg/dL 16 13   CREATININE mg/dL 0.73 0.71   GLUCOSE mg/dL 120* 102*   MAGNESIUM mg/dL 1.9 1.9     Cr Clearance Estimated Creatinine Clearance: 69.3 mL/min (by C-G formula based on SCr of 0.73 mg/dL).  Coag   Results from last 7 days   Lab Units 11/30/24  0705 11/30/24  0027 11/29/24  1710 11/29/24  1307   INR   --  1.91*  --  2.22*   APTT seconds 81.1* 47.5* 36.0* 36.2*     HbA1C No results found for: \"HGBA1C\"  Blood Glucose No results found for: \"POCGLU\"  Infection     CMP   Results from last 7 days   Lab Units 11/30/24  0027 11/29/24  1307   SODIUM mmol/L 136 137   POTASSIUM mmol/L 4.2 4.9   CHLORIDE mmol/L 101 100   CO2 mmol/L 27.8 29.1*   BUN mg/dL 16 13   CREATININE mg/dL 0.73 0.71   GLUCOSE mg/dL 120* 102*     ABG      UA      JV  No results found for: \"POCMETH\", \"POCAMPHET\", \"POCBARBITUR\", \"POCBENZO\", \"POCCOCAINE\", \"POCOPIATES\", \"POCOXYCODO\", \"POCPHENCYC\", \"POCPROPOXY\", \"POCTHC\", \"POCTRICYC\"  Lysis Labs   Results from last 7 days   Lab Units 11/30/24  0705 11/30/24  0027 11/29/24  1710 11/29/24  1307   INR   --  1.91*  --  2.22*   APTT seconds 81.1* 47.5* 36.0* 36.2*   HEMOGLOBIN g/dL  --  7.7*  --  8.9*   PLATELETS 10*3/mm3  --  158  --  169   CREATININE mg/dL  --  0.73  --  0.71     Radiology(recent) XR Chest 1 View    Result Date: 11/29/2024  Impression: 1.  Persistent " retrocardiac left lower lobe airspace disease may represent atelectasis or pneumonia. 2.  No new airspace disease is identified. 2. Probable trace bibasilar pleural effusions. 3. Stable cardiomegaly. Electronically Signed: Karie Bustos MD  11/29/2024 1:39 PM EST  Workstation ID: [id number]     Assessment & Plan   Assessment / Plan     Brief Patient Summary:  Jaja Carcamo is a 79 y.o. female with right arm swelling and right internal jugular vein DVT.  Patient has a right arm fistula on that side that has not been used in 8 years.  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  I think this could likely be successfully managed with a fistulogram with possible intervention.  I discussed this with the patient and she verbalized understanding.  Will plan to perform this early next week.  Patient noted to have leukocytosis and elevated INR.  I am unsure what the cause of this is.  Will defer workup to primary team.  Agree with heparin drip for now    Active Hospital Problems:  Active Hospital Problems    Diagnosis     **Acute deep vein thrombosis (DVT) of other vein of right upper extremity        Plan:   Continue heparin drip  Keep right arm elevated to help with edema.  Will plan for right arm fistulogram early next week.  Supportive care et al per primary team.       VTE Prophylaxis:  Pharmacologic VTE prophylaxis orders are present.         MD Paulino Simental II, II, MD  11/30/24  11:10 EST  Office Number [phone]    [facility] Vascular Surgery

## 2024-11-30 NOTE — PLAN OF CARE
Goal Outcome Evaluation:  Plan of Care Reviewed With: patient        Progress: no change  Outcome Evaluation: Patient admitted with DVT RUE. Alert and oriented, hard of hearing. Complains of pain in RUE and back. Medicated per MAR orders. Patient to return to [facility] when medically ready.

## 2024-11-30 NOTE — CONSULTS
[facility]                                                                                             Nephrology Initial Consult Note    Patient Identification:  Name: Jaja Carcamo MRN: [id number]  Age: 79 y.o. : 1945  Sex: female  Date:2024    Requesting Physician: As per consult order.  Reason for Consultation: kidney transplant   Information from:patient/ family/ chart      History of Present Illness: This is a 79 y.o. year old female  8 years post kidney transplant, had been on HD prior to that. Has right arm AVF with increased edema and dilatation of her AVF with right arm pain. Creat remains at baseline  No soa or cp    The following medical history and medications personally reviewed by me:    Problem List:     Acute deep vein thrombosis (DVT) of other vein of right upper extremity      Past Medical History:  Past Medical History:   Diagnosis Date    Allergic rhinitis 2015    Asthma 08/10/2017    Atrial flutter 2017    Chronic diarrhea 04/15/2019    Chronic hypoxemic respiratory failure 2024    Chronic pain 2015    COPD (chronic obstructive pulmonary disease)     COVID-19 virus detected 2022    Cytokine release syndrome, grade 1 2022    Edema of both lower extremities due to peripheral venous insufficiency 2021    ESRD on hemodialysis 2013    Essential (primary) hypertension 2022    Fracture of ulnar styloid 2022    Fracture of unspecified carpal bone, right wrist, subsequent encounter for fracture with routine healing 2022    Gastroesophageal reflux disease 2020    Gout 08/10/2017    Hearing loss 08/10/2017    History of appendectomy     History of DVT (deep vein thrombosis) 2020    History of kidney transplant 2017    History of lobectomy of lung 2024:  RIGHT Lower Lobe Mass-->  Right Video-assisted thoracoscopy with a moderate-to-large wedge resection of the RLL (by Dr. Haris Mcconnell @ [facility])--> Poorly differentiated carcinoma of the RLL.      History of repair of hip joint 05/21/2013    History of suicide attempt 05/20/2024    Hyperlipidemia 08/11/2014    Hypothyroidism, unspecified 03/03/2022    Infection due to extended spectrum beta lactamase (ESBL) producing bacteria 03/11/2016    No A2K system hx. +ESBL E coli urine on 3/11/16.      Irritable bowel syndrome 04/15/2019    Long term (current) use of immunosuppressive biologic 04/28/2021    Long term current use of anticoagulant therapy 01/18/2024    Malignant neoplasm of lung 08/10/2017    Menopausal flushing 08/30/2021    Mitral valve regurgitation 07/06/2015    Mixed anxiety and depressive disorder 04/30/2015    Non-small cell lung cancer 08/10/2017    Nonobstructive atherosclerosis of coronary artery 06/02/2019 08/12/2022: CATH: Prashant: NSTEMI assoc with Covid-19: LM:-nl;  LAD: diffuse, Ca++; 30%; CIRC: Dominant. Normal; RCA: small; 50% diffuse, proximal and mid-segment.      NSTEMI (non-ST elevated myocardial infarction) 08/11/2022    Obsessive-compulsive disorder 04/15/2019    Osteoarthritis 05/20/2024    Paroxysmal atrial fibrillation 05/11/2017    Paroxysmal supraventricular tachycardia 05/11/2017    Peripheral neuropathy 08/11/2014    Personal history of peptic ulcer disease 11/12/2020    Postoperative anemia due to acute blood loss 05/22/2013    Right wrist fracture 03/01/2022    Seasonal allergies 08/10/2017    Secondary hyperparathyroidism of renal origin 09/14/2015    Tricuspid valve regurgitation 03/15/2017    Ulcer of lower extremity 08/30/2021    Unspecified acute appendicitis 03/03/2022    Valvular heart disease 05/20/2024    Wegener's granulomatosis with renal involvement 03/03/2022       Past Surgical History:  Past Surgical History:   Procedure Laterality Date    APPENDECTOMY      BREAST SURGERY  Left     cysts rmeoved    CARDIAC CATHETERIZATION Right 08/12/2022    Procedure: Left Heart Cath and coronary angiogram;  Surgeon: Jak Gavin MD;  Location: [facility] CATH INVASIVE LOCATION;  Service: Cardiology;  Laterality: Right;    CLOSED REDUCTION WRIST FRACTURE Right 03/01/2022    Procedure: WRIST CLOSED REDUCTION;  Surgeon: Gaudencio Townsend MD;  Location: [facility] MAIN OR;  Service: Orthopedics;  Laterality: Right;    CYSTOSCOPY      HIP ARTHROPLASTY      HYSTERECTOMY      LUNG LOBECTOMY Right     TRANSPLANTATION RENAL          Home Meds:   Medications Prior to Admission   Medication Sig Dispense Refill Last Dose/Taking    acetaminophen (Tylenol) 325 MG tablet Take 2 tablets by mouth Every 4 (Four) Hours As Needed for Fever or Mild Pain. Indications: Pain   Unknown    albuterol sulfate  (90 Base) MCG/ACT inhaler Inhale 2 puffs Every 6 (Six) Hours As Needed for Wheezing.   Unknown    apixaban (ELIQUIS) 5 MG tablet tablet Take 1 tablet by mouth Every 12 (Twelve) Hours. 60 tablet 0 Unknown    colestipol (Colestid) 1 g tablet Take 1 tablet by mouth Daily. Indications: High Amount of Cholesterol in the Blood   Unknown    diazePAM (Valium) 5 MG tablet Take 5 mg by mouth Every 12 (Twelve) Hours As Needed for Anxiety or Sleep. Indications: Feeling Anxious       DULoxetine HCl 40 MG capsule delayed-release particles Take 1 capsule by mouth Daily. Indications: Major Depressive Disorder   Unknown    furosemide (LASIX) 40 MG tablet Take 1 tablet by mouth Daily. (Patient taking differently: Take 1 tablet by mouth 2 (Two) Times a Day. Indications: Cardiac Failure) 30 tablet 1 Unknown    HYDROcodone-acetaminophen (NORCO) 5-325 MG per tablet Take 1 tablet by mouth Every 6 (Six) Hours As Needed for Severe Pain. Indications: Pain   Unknown    hydrOXYzine (ATARAX) 25 MG tablet Take 1 tablet by mouth Every Night.   Unknown    levothyroxine (SYNTHROID, LEVOTHROID) 100 MCG tablet Take 1 tablet by mouth Daily. Indications:  Underactive Thyroid   Unknown    LORazepam (ATIVAN) 0.5 MG tablet Take 1 tablet by mouth Every 6 (Six) Hours.   Unknown    metoprolol tartrate (LOPRESSOR) 50 MG tablet Take 1 tablet by mouth 3 times a day.   Unknown    mirtazapine (REMERON) 15 MG tablet Take 1 tablet by mouth Every Night.       O2 (OXYGEN) Inhale 3 L/min Continuous. Indications: Prevention of COPD Worsening   Unknown    ondansetron (ZOFRAN) 4 MG tablet Take 1 tablet by mouth Every 8 (Eight) Hours As Needed for Nausea or Vomiting.   Unknown    polyethylene glycol (MiraLax) 17 GM/SCOOP powder Take 17 g by mouth Daily.   Unknown    potassium chloride (MICRO-K) 10 MEQ CR capsule Take 1 capsule by mouth Daily.   Unknown    predniSONE (DELTASONE) 5 MG tablet Take 1 tablet by mouth Daily. Indications: ACUTE EXACERBATION OF COPD (INACTIVE)   Unknown    QUEtiapine (SEROquel) 25 MG tablet Take 1 tablet by mouth Every Night.   Unknown    sennosides-docusate (PERICOLACE) 8.6-50 MG per tablet Take 1 tablet by mouth 2 (Two) Times a Day.   Unknown    simvastatin (ZOCOR) 20 MG tablet Take 1 tablet by mouth Every Night.   Unknown    tacrolimus (PROGRAF) 0.5 MG capsule Take 1 capsule by mouth 2 (Two) Times a Day. 60 capsule 1 Unknown    vitamin B-12 (CYANOCOBALAMIN) 1000 MCG tablet Take 1 tablet by mouth Daily.   Unknown    vitamin D (ERGOCALCIFEROL) 1.25 MG (55921 UT) capsule capsule Take 1 capsule by mouth 1 (One) Time Per Week.   Unknown       Current Meds:   Current Facility-Administered Medications   Medication Dose Route Frequency Provider Last Rate Last Admin    atorvastatin (LIPITOR) tablet 10 mg  10 mg Oral Daily Hernesto Snyder MD   10 mg at 11/30/24 1031    sennosides-docusate (PERICOLACE) 8.6-50 MG per tablet 2 tablet  2 tablet Oral BID PRN Hernesto Snyder MD        And    polyethylene glycol (MIRALAX) packet 17 g  17 g Oral Daily PRN Hernesto Snyder MD        And    bisacodyl (DULCOLAX) EC tablet 5 mg  5 mg Oral Daily PRN Hernesto Snyder MD        And     bisacodyl (DULCOLAX) suppository 10 mg  10 mg Rectal Daily PRN Hernesto Snyder MD        cholestyramine light packet 4 g  1 packet Oral Q24H Hernesto Snyder MD   4 g at 11/30/24 1031    diazePAM (VALIUM) tablet 5 mg  5 mg Oral Q12H PRN Hernesto Snyder MD        DULoxetine (CYMBALTA) DR capsule 40 mg  40 mg Oral Daily Hernesto Snyder MD   40 mg at 11/30/24 1029    furosemide (LASIX) tablet 40 mg  40 mg Oral Daily Hernesto Snyder MD   40 mg at 11/30/24 1030    heparin 97466 units/250 mL (100 units/mL) in 0.45 % NaCl infusion  18 Units/kg/hr Intravenous Titrated Hernesto Snyder MD 16.39 mL/hr at 11/30/24 1130 22 Units/kg/hr at 11/30/24 1130    HYDROcodone-acetaminophen (NORCO) 7.5-325 MG per tablet 1 tablet  1 tablet Oral Q6H PRN Hernesto Snyder MD   1 tablet at 11/30/24 1038    hydrOXYzine (ATARAX) tablet 25 mg  25 mg Oral Nightly Hernesto Snyder MD   25 mg at 11/29/24 2149    influenza vac split high-dose (FLUZONE HIGH DOSE) injection 0.5 mL  0.5 mL Intramuscular During Hospitalization Hernesto Snyder MD        levothyroxine (SYNTHROID, LEVOTHROID) tablet 50 mcg  50 mcg Oral Q AM Hernesto Snyder MD   50 mcg at 11/30/24 0506    LORazepam (ATIVAN) tablet 0.5 mg  0.5 mg Oral Q6H Hernesto Snyder MD   0.5 mg at 11/30/24 1031    metoprolol tartrate (LOPRESSOR) tablet 50 mg  50 mg Oral Q8H Hernesto Snyder MD   50 mg at 11/30/24 1233    mirtazapine (REMERON) tablet 15 mg  15 mg Oral Nightly Hernesto Snyder MD   15 mg at 11/29/24 2149    nitroglycerin (NITROSTAT) SL tablet 0.4 mg  0.4 mg Sublingual Q5 Min PRN Hernesto Snyder MD        ondansetron (ZOFRAN) injection 4 mg  4 mg Intravenous Q6H PRN Hector Perez DO   4 mg at 11/30/24 0102    polyethylene glycol (MIRALAX) packet 17 g  17 g Oral Daily Hernesto Snyder MD   17 g at 11/30/24 1031    potassium chloride (K-DUR,KLOR-CON) ER tablet 10 mEq  10 mEq Oral Daily Hernesto Snyder MD   10 mEq at 11/30/24 1031    QUEtiapine (SEROquel) tablet 25 mg  25 mg Oral Nightly Hernesto Snyder MD   25 mg  at 11/29/24 2149    sennosides-docusate (PERICOLACE) 8.6-50 MG per tablet 1 tablet  1 tablet Oral BID Hernesto Snyder MD   1 tablet at 11/30/24 1030    sodium chloride 0.9 % flush 10 mL  10 mL Intravenous PRN Michelle Altamirano APRN        sodium chloride 0.9 % flush 10 mL  10 mL Intravenous Q12H Hernesto Snyder MD   10 mL at 11/30/24 1032    sodium chloride 0.9 % flush 10 mL  10 mL Intravenous PRN Hernesto Snyder MD        sodium chloride 0.9 % infusion 40 mL  40 mL Intravenous PRN Hernesto Snyder MD        tacrolimus (PROGRAF) capsule 0.5 mg  0.5 mg Oral BID Hernesto Snyder MD   0.5 mg at 11/30/24 1031       Allergies:  Allergies   Allergen Reactions    Zolpidem Other (See Comments), Unknown (See Comments) and Unknown - High Severity     KEPT HER AWAKE  KEPT HER AWAKE  KEPT HER AWAKE  KEPT HER AWAKE         Social History:   Social History     Socioeconomic History    Marital status:    Tobacco Use    Smoking status: Former     Passive exposure: Never    Smokeless tobacco: Never   Vaping Use    Vaping status: Former   Substance and Sexual Activity    Alcohol use: Never    Drug use: Never    Sexual activity: Defer        Family History:  Family History   Problem Relation Age of Onset    No Known Problems Mother     No Known Problems Father         Review of Systems: as per HPI, in addition:    General:      no weakness / fatigue,                       No fevers / chills                       no weight loss  HEENT;       no dysphagia or visual changes  Neck:           normal range of motion, no swelling  Respiratory: no cough / congestion                      No shortness of air                       No wheezing  CV:              No chest pain                       No palpitations  Abdomen/GI: no nausea / vomiting                      No diarrhea / constipation                      No abdominal pain  :             no dysuria / urinary frequency                       No urgency, normal output  Endocrine:   no  "polyuria / polydipsia,                      No heat or cold intolerance  Skin:           no rashes or skin lesions   Vascular:   No edema, +left arm edema/ pain  Musculoskeletal: no joint pain or deformities      Physical Exam:  Vitals:   Temp (24hrs), Av.6 °F (36.4 °C), Min:97.2 °F (36.2 °C), Max:98.3 °F (36.8 °C)    /62   Pulse 100   Temp 97.2 °F (36.2 °C) (Temporal)   Resp 14   Ht 167.6 cm (66\")   Wt 81 kg (178 lb 9.2 oz)   SpO2 100%   BMI 28.82 kg/m²   Intake/Output:     Intake/Output Summary (Last 24 hours) at 2024 1436  Last data filed at 2024 1300  Gross per 24 hour   Intake 360 ml   Output --   Net 360 ml        Wt Readings from Last 1 Encounters:   24 1715 81 kg (178 lb 9.2 oz)   24 1238 74.5 kg (164 lb 3.9 oz)       Exam:    General Appearance:  Awake, alert, no acute distress  well-appearing   Head and Face:  Normocephalic, atraumatic, mucous membranes moist, oropharynx clear   Eyes:  No icterus, pupils equal round and reactive to light, extraocular movements intact    ENMT: Moist mucosa, tongue symmetric    Neck: Supple  no jugular venous distention  no thyromegaly   Pulmonary:  Respiratory effort: Normal  Auscultation of lungs: Clear bilaterally  No wheezes  No rhonchi  Good air movement, good expansion   Chest wall:  No tenderness or deformity   Cardiovascular:  Auscultation of the heart: Normal rhythm, no murmurs  no edema of bilateral lower extremities  Right arm +edema, dialted AVF   Abdomen:  Abdomen: soft, nontender, normal bowel sounds all four quadrants, no masses   Liver and spleen: no hepatosplenomegaly   Musculoskeletal: Digits and nails: normal  Normal range of motion  No joint swelling or gross deformities    Skin: Skin inspection: color normal, no visible rashes or lesions  Skin palpation: texture, turgor normal, no palpable lesions   Lymphatic:  no cervical lymphadenopathy    Psychiatric: Judgement and insight: normal  Orientation to person place " and time: normal  Mood and affect: normal       DATA:  Radiology and Labs:  The following labs independently reviewed by me, additional AM labs ordered  Old records independently reviewed showing DDKT  The following radiologic studies independently viewed by me, findings US+ Jugualr DVT  Interval notes, chart personally reviewed by me.  I have reviewed and summarized old records as detailed above  Plan of care discussed with pt/family/RN  New problems include arm DVT/ IJ DVT    Dialysis patient access: right arm AVF    Risk/ complexity of medical care/ medical decision making: high needs IV contrast with fistulogram  Chronic illness with severe exacerbation or progression      Labs:   Recent Results (from the past 24 hours)   aPTT    Collection Time: 11/29/24  5:10 PM    Specimen: Arm, Left; Blood   Result Value Ref Range    PTT 36.0 (H) 22.7 - 35.4 seconds   [facility] PCR - Swab, Axilla Right, Axilla Left and Groin    Collection Time: 11/29/24  6:11 PM    Specimen: Axilla Right, Axilla Left and Groin; Swab   Result Value Ref Range    [facility] PCR Not Detected Not Detected   aPTT    Collection Time: 11/30/24 12:27 AM    Specimen: Arm, Left; Blood   Result Value Ref Range    PTT 47.5 (H) 22.7 - 35.4 seconds   Basic Metabolic Panel    Collection Time: 11/30/24 12:27 AM    Specimen: Arm, Left; Blood   Result Value Ref Range    Glucose 120 (H) 65 - 99 mg/dL    BUN 16 8 - 23 mg/dL    Creatinine 0.73 0.57 - 1.00 mg/dL    Sodium 136 136 - 145 mmol/L    Potassium 4.2 3.5 - 5.2 mmol/L    Chloride 101 98 - 107 mmol/L    CO2 27.8 22.0 - 29.0 mmol/L    Calcium 9.0 8.6 - 10.5 mg/dL    BUN/Creatinine Ratio 21.9 7.0 - 25.0    Anion Gap 7.2 5.0 - 15.0 mmol/L    eGFR 84.8 >60.0 mL/min/1.73   Magnesium    Collection Time: 11/30/24 12:27 AM    Specimen: Arm, Left; Blood   Result Value Ref Range    Magnesium 1.9 1.6 - 2.4 mg/dL   Protime-INR    Collection Time: 11/30/24 12:27 AM    Specimen: Arm, Left; Blood   Result Value Ref  Range    Protime 22.0 (H) 11.7 - 14.2 Seconds    INR 1.91 (H) 0.90 - 1.10   CBC Auto Differential    Collection Time: 11/30/24 12:27 AM    Specimen: Arm, Left; Blood   Result Value Ref Range    WBC 12.15 (H) 3.40 - 10.80 10*3/mm3    RBC 3.02 (L) 3.77 - 5.28 10*6/mm3    Hemoglobin 7.7 (L) 12.0 - 15.9 g/dL    Hematocrit 28.1 (L) 34.0 - 46.6 %    MCV 93.0 79.0 - 97.0 fL    MCH 25.5 (L) 26.6 - 33.0 pg    MCHC 27.4 (L) 31.5 - 35.7 g/dL    RDW 18.1 (H) 12.3 - 15.4 %    RDW-SD 61.3 (H) 37.0 - 54.0 fl    MPV 9.8 6.0 - 12.0 fL    Platelets 158 140 - 450 10*3/mm3    Neutrophil % 86.5 (H) 42.7 - 76.0 %    Lymphocyte % 6.8 (L) 19.6 - 45.3 %    Monocyte % 5.6 5.0 - 12.0 %    Eosinophil % 0.3 0.3 - 6.2 %    Basophil % 0.2 0.0 - 1.5 %    Immature Grans % 0.6 (H) 0.0 - 0.5 %    Neutrophils, Absolute 10.50 (H) 1.70 - 7.00 10*3/mm3    Lymphocytes, Absolute 0.83 0.70 - 3.10 10*3/mm3    Monocytes, Absolute 0.68 0.10 - 0.90 10*3/mm3    Eosinophils, Absolute 0.04 0.00 - 0.40 10*3/mm3    Basophils, Absolute 0.03 0.00 - 0.20 10*3/mm3    Immature Grans, Absolute 0.07 (H) 0.00 - 0.05 10*3/mm3    nRBC 0.0 0.0 - 0.2 /100 WBC   aPTT    Collection Time: 11/30/24  7:05 AM    Specimen: Arm, Left; Blood   Result Value Ref Range    PTT 81.1 (C) 22.7 - 35.4 seconds       Radiology:  Imaging Results (Last 24 Hours)       ** No results found for the last 24 hours. **                 ASSESSMENT:   Kidney transplent- stable graft function    Acute deep vein thrombosis (DVT) of other vein of right upper extremity  Immunosuppression  Anemia         DISCUSSION/PLAN:   Stable renal graft function, now 8 years post transplant  Creatinine at baseline  Continue current immunosuppression with low dose prograf at home dose  FK level pending  Noted plans for fistulogram on Monday- she will need IVFs prior for renal protection      Continue to monitor electrolytes and volume closely  I appreciate the consult request.  Please send me a secure chat message with any  nonurgent questions regarding patient care.  For any urgent patient care issues please call my office number below.      Michael Clay MD  [facility]  Office phone number: [phone]  Answering service phone number: [phone]      11/30/2024        Dictation via Dragon dictation software

## 2024-12-01 ENCOUNTER — APPOINTMENT (OUTPATIENT)
Dept: CT IMAGING | Facility: HOSPITAL | Age: 79
End: 2024-12-01
Payer: MEDICARE

## 2024-12-01 ENCOUNTER — APPOINTMENT (OUTPATIENT)
Dept: GENERAL RADIOLOGY | Facility: HOSPITAL | Age: 79
End: 2024-12-01
Payer: MEDICARE

## 2024-12-01 LAB
ANION GAP SERPL CALCULATED.3IONS-SCNC: 9.6 MMOL/L (ref 5–15)
APTT PPP: 56.5 SECONDS (ref 22.7–35.4)
APTT PPP: 67.5 SECONDS (ref 22.7–35.4)
B PARAPERT DNA SPEC QL NAA+PROBE: NOT DETECTED
B PERT DNA SPEC QL NAA+PROBE: NOT DETECTED
BASOPHILS # BLD AUTO: 0.03 10*3/MM3 (ref 0–0.2)
BASOPHILS NFR BLD AUTO: 0.3 % (ref 0–1.5)
BUN SERPL-MCNC: 21 MG/DL (ref 8–23)
BUN/CREAT SERPL: 24.1 (ref 7–25)
C PNEUM DNA NPH QL NAA+NON-PROBE: NOT DETECTED
CALCIUM SPEC-SCNC: 8.9 MG/DL (ref 8.6–10.5)
CHLORIDE SERPL-SCNC: 100 MMOL/L (ref 98–107)
CO2 SERPL-SCNC: 26.4 MMOL/L (ref 22–29)
CREAT SERPL-MCNC: 0.87 MG/DL (ref 0.57–1)
DEPRECATED RDW RBC AUTO: 62.1 FL (ref 37–54)
EGFRCR SERPLBLD CKD-EPI 2021: 68.7 ML/MIN/1.73
EOSINOPHIL # BLD AUTO: 0.09 10*3/MM3 (ref 0–0.4)
EOSINOPHIL NFR BLD AUTO: 1 % (ref 0.3–6.2)
ERYTHROCYTE [DISTWIDTH] IN BLOOD BY AUTOMATED COUNT: 18.1 % (ref 12.3–15.4)
FLUAV SUBTYP SPEC NAA+PROBE: NOT DETECTED
FLUBV RNA ISLT QL NAA+PROBE: NOT DETECTED
GLUCOSE SERPL-MCNC: 162 MG/DL (ref 65–99)
HADV DNA SPEC NAA+PROBE: NOT DETECTED
HCOV 229E RNA SPEC QL NAA+PROBE: NOT DETECTED
HCOV HKU1 RNA SPEC QL NAA+PROBE: NOT DETECTED
HCOV NL63 RNA SPEC QL NAA+PROBE: NOT DETECTED
HCOV OC43 RNA SPEC QL NAA+PROBE: NOT DETECTED
HCT VFR BLD AUTO: 28.4 % (ref 34–46.6)
HGB BLD-MCNC: 8 G/DL (ref 12–15.9)
HMPV RNA NPH QL NAA+NON-PROBE: NOT DETECTED
HPIV1 RNA ISLT QL NAA+PROBE: NOT DETECTED
HPIV2 RNA SPEC QL NAA+PROBE: NOT DETECTED
HPIV3 RNA NPH QL NAA+PROBE: NOT DETECTED
HPIV4 P GENE NPH QL NAA+PROBE: NOT DETECTED
IMM GRANULOCYTES # BLD AUTO: 0.04 10*3/MM3 (ref 0–0.05)
IMM GRANULOCYTES NFR BLD AUTO: 0.5 % (ref 0–0.5)
INR PPP: 1.85 (ref 0.9–1.1)
LYMPHOCYTES # BLD AUTO: 1.48 10*3/MM3 (ref 0.7–3.1)
LYMPHOCYTES NFR BLD AUTO: 17.1 % (ref 19.6–45.3)
M PNEUMO IGG SER IA-ACNC: NOT DETECTED
MAGNESIUM SERPL-MCNC: 1.9 MG/DL (ref 1.6–2.4)
MCH RBC QN AUTO: 26.5 PG (ref 26.6–33)
MCHC RBC AUTO-ENTMCNC: 28.2 G/DL (ref 31.5–35.7)
MCV RBC AUTO: 94 FL (ref 79–97)
MONOCYTES # BLD AUTO: 0.54 10*3/MM3 (ref 0.1–0.9)
MONOCYTES NFR BLD AUTO: 6.3 % (ref 5–12)
NEUTROPHILS NFR BLD AUTO: 6.45 10*3/MM3 (ref 1.7–7)
NEUTROPHILS NFR BLD AUTO: 74.8 % (ref 42.7–76)
NRBC BLD AUTO-RTO: 0.2 /100 WBC (ref 0–0.2)
PLATELET # BLD AUTO: 185 10*3/MM3 (ref 140–450)
PMV BLD AUTO: 9.7 FL (ref 6–12)
POTASSIUM SERPL-SCNC: 4.8 MMOL/L (ref 3.5–5.2)
PROTHROMBIN TIME: 21.4 SECONDS (ref 11.7–14.2)
RBC # BLD AUTO: 3.02 10*6/MM3 (ref 3.77–5.28)
RHINOVIRUS RNA SPEC NAA+PROBE: NOT DETECTED
RSV RNA NPH QL NAA+NON-PROBE: NOT DETECTED
SARS-COV-2 RNA NPH QL NAA+NON-PROBE: NOT DETECTED
SODIUM SERPL-SCNC: 136 MMOL/L (ref 136–145)
WBC NRBC COR # BLD AUTO: 8.63 10*3/MM3 (ref 3.4–10.8)

## 2024-12-01 PROCEDURE — 63710000001 TACROLIMUS PER 1 MG: Performed by: INTERNAL MEDICINE

## 2024-12-01 PROCEDURE — 71045 X-RAY EXAM CHEST 1 VIEW: CPT

## 2024-12-01 PROCEDURE — 25010000002 PROCHLORPERAZINE 10 MG/2ML SOLUTION: Performed by: INTERNAL MEDICINE

## 2024-12-01 PROCEDURE — 85610 PROTHROMBIN TIME: CPT | Performed by: INTERNAL MEDICINE

## 2024-12-01 PROCEDURE — 99232 SBSQ HOSP IP/OBS MODERATE 35: CPT | Performed by: STUDENT IN AN ORGANIZED HEALTH CARE EDUCATION/TRAINING PROGRAM

## 2024-12-01 PROCEDURE — 80048 BASIC METABOLIC PNL TOTAL CA: CPT | Performed by: INTERNAL MEDICINE

## 2024-12-01 PROCEDURE — 63710000001 PREDNISONE PER 5 MG: Performed by: INTERNAL MEDICINE

## 2024-12-01 PROCEDURE — 71275 CT ANGIOGRAPHY CHEST: CPT

## 2024-12-01 PROCEDURE — 83735 ASSAY OF MAGNESIUM: CPT | Performed by: INTERNAL MEDICINE

## 2024-12-01 PROCEDURE — 85730 THROMBOPLASTIN TIME PARTIAL: CPT | Performed by: INTERNAL MEDICINE

## 2024-12-01 PROCEDURE — 36600 WITHDRAWAL OF ARTERIAL BLOOD: CPT

## 2024-12-01 PROCEDURE — 25010000002 AMPICILLIN-SULBACTAM PER 1.5 G: Performed by: INTERNAL MEDICINE

## 2024-12-01 PROCEDURE — 85025 COMPLETE CBC W/AUTO DIFF WBC: CPT | Performed by: INTERNAL MEDICINE

## 2024-12-01 PROCEDURE — 0202U NFCT DS 22 TRGT SARS-COV-2: CPT | Performed by: INTERNAL MEDICINE

## 2024-12-01 PROCEDURE — 94761 N-INVAS EAR/PLS OXIMETRY MLT: CPT

## 2024-12-01 PROCEDURE — C1751 CATH, INF, PER/CENT/MIDLINE: HCPCS

## 2024-12-01 PROCEDURE — 25510000001 IOPAMIDOL PER 1 ML: Performed by: INTERNAL MEDICINE

## 2024-12-01 PROCEDURE — 94799 UNLISTED PULMONARY SVC/PX: CPT

## 2024-12-01 PROCEDURE — 82803 BLOOD GASES ANY COMBINATION: CPT

## 2024-12-01 PROCEDURE — 25010000002 ONDANSETRON PER 1 MG: Performed by: INTERNAL MEDICINE

## 2024-12-01 PROCEDURE — 25010000002 HEPARIN (PORCINE) 25000-0.45 UT/250ML-% SOLUTION: Performed by: INTERNAL MEDICINE

## 2024-12-01 RX ORDER — DULOXETIN HYDROCHLORIDE 20 MG/1
20 CAPSULE, DELAYED RELEASE ORAL DAILY
Status: DISCONTINUED | OUTPATIENT
Start: 2024-12-02 | End: 2024-12-19 | Stop reason: HOSPADM

## 2024-12-01 RX ORDER — SODIUM CHLORIDE 9 MG/ML
100 INJECTION, SOLUTION INTRAVENOUS CONTINUOUS
Status: DISCONTINUED | OUTPATIENT
Start: 2024-12-03 | End: 2024-12-01

## 2024-12-01 RX ORDER — IOPAMIDOL 755 MG/ML
100 INJECTION, SOLUTION INTRAVASCULAR
Status: COMPLETED | OUTPATIENT
Start: 2024-12-01 | End: 2024-12-01

## 2024-12-01 RX ORDER — PREDNISONE 5 MG/1
5 TABLET ORAL
Status: DISCONTINUED | OUTPATIENT
Start: 2024-12-01 | End: 2024-12-19 | Stop reason: HOSPADM

## 2024-12-01 RX ORDER — SILDENAFIL CITRATE 20 MG/1
10 TABLET ORAL 3 TIMES DAILY
Status: DISCONTINUED | OUTPATIENT
Start: 2024-12-01 | End: 2024-12-19 | Stop reason: HOSPADM

## 2024-12-01 RX ORDER — LORAZEPAM 0.5 MG/1
0.5 TABLET ORAL EVERY 6 HOURS PRN
Status: DISCONTINUED | OUTPATIENT
Start: 2024-12-01 | End: 2024-12-13

## 2024-12-01 RX ORDER — SODIUM CHLORIDE 9 MG/ML
100 INJECTION, SOLUTION INTRAVENOUS CONTINUOUS
Status: DISCONTINUED | OUTPATIENT
Start: 2024-12-01 | End: 2024-12-01

## 2024-12-01 RX ORDER — TACROLIMUS 1 MG/1
1 CAPSULE ORAL 2 TIMES DAILY
Status: DISCONTINUED | OUTPATIENT
Start: 2024-12-01 | End: 2024-12-06

## 2024-12-01 RX ADMIN — LORAZEPAM 0.5 MG: 0.5 TABLET ORAL at 10:38

## 2024-12-01 RX ADMIN — HYDROCODONE BITARTRATE AND ACETAMINOPHEN 1 TABLET: 7.5; 325 TABLET ORAL at 22:16

## 2024-12-01 RX ADMIN — LEVOTHYROXINE SODIUM 50 MCG: 50 TABLET ORAL at 05:19

## 2024-12-01 RX ADMIN — HEPARIN SODIUM 22 UNITS/KG/HR: 10000 INJECTION, SOLUTION INTRAVENOUS at 05:22

## 2024-12-01 RX ADMIN — IOPAMIDOL 100 ML: 755 INJECTION, SOLUTION INTRAVENOUS at 08:34

## 2024-12-01 RX ADMIN — FUROSEMIDE 40 MG: 40 TABLET ORAL at 10:34

## 2024-12-01 RX ADMIN — DULOXETINE HYDROCHLORIDE 40 MG: 20 CAPSULE, DELAYED RELEASE ORAL at 10:34

## 2024-12-01 RX ADMIN — CHOLESTYRAMINE 4 G: 4 POWDER, FOR SUSPENSION ORAL at 10:35

## 2024-12-01 RX ADMIN — FAMOTIDINE 20 MG: 10 INJECTION INTRAVENOUS at 21:57

## 2024-12-01 RX ADMIN — AMPICILLIN SODIUM AND SULBACTAM SODIUM 1.5 G: 1; .5 INJECTION, POWDER, FOR SOLUTION INTRAMUSCULAR; INTRAVENOUS at 21:56

## 2024-12-01 RX ADMIN — MIRTAZAPINE 15 MG: 15 TABLET, FILM COATED ORAL at 21:57

## 2024-12-01 RX ADMIN — ATORVASTATIN CALCIUM 10 MG: 10 TABLET ORAL at 10:36

## 2024-12-01 RX ADMIN — PROCHLORPERAZINE EDISYLATE 5 MG: 5 INJECTION INTRAMUSCULAR; INTRAVENOUS at 14:58

## 2024-12-01 RX ADMIN — Medication 10 ML: at 10:37

## 2024-12-01 RX ADMIN — HYDROXYZINE HYDROCHLORIDE 25 MG: 25 TABLET ORAL at 21:56

## 2024-12-01 RX ADMIN — HEPARIN SODIUM 28 UNITS/KG/HR: 10000 INJECTION, SOLUTION INTRAVENOUS at 21:46

## 2024-12-01 RX ADMIN — ONDANSETRON 4 MG: 2 INJECTION INTRAMUSCULAR; INTRAVENOUS at 12:54

## 2024-12-01 RX ADMIN — PREDNISONE 5 MG: 5 TABLET ORAL at 12:55

## 2024-12-01 RX ADMIN — QUETIAPINE FUMARATE 25 MG: 25 TABLET ORAL at 21:56

## 2024-12-01 RX ADMIN — TACROLIMUS 1 MG: 1 CAPSULE ORAL at 21:55

## 2024-12-01 RX ADMIN — TACROLIMUS 0.5 MG: 0.5 CAPSULE ORAL at 10:36

## 2024-12-01 RX ADMIN — POTASSIUM CHLORIDE 10 MEQ: 750 TABLET, EXTENDED RELEASE ORAL at 10:35

## 2024-12-01 RX ADMIN — METOPROLOL TARTRATE 50 MG: 50 TABLET, FILM COATED ORAL at 21:56

## 2024-12-01 RX ADMIN — FAMOTIDINE 20 MG: 10 INJECTION INTRAVENOUS at 10:36

## 2024-12-01 RX ADMIN — SENNOSIDES AND DOCUSATE SODIUM 1 TABLET: 50; 8.6 TABLET ORAL at 10:34

## 2024-12-01 RX ADMIN — METOPROLOL TARTRATE 50 MG: 50 TABLET, FILM COATED ORAL at 05:19

## 2024-12-01 RX ADMIN — SILDENAFIL 10 MG: 20 TABLET ORAL at 21:55

## 2024-12-01 RX ADMIN — HYDROCODONE BITARTRATE AND ACETAMINOPHEN 1 TABLET: 7.5; 325 TABLET ORAL at 05:19

## 2024-12-01 RX ADMIN — POLYETHYLENE GLYCOL 3350 17 G: 17 POWDER, FOR SOLUTION ORAL at 10:34

## 2024-12-01 RX ADMIN — Medication 10 ML: at 21:56

## 2024-12-01 NOTE — CONSULTS
Order noted for PICC placement. Pt's chart reviewed. This pt is not on vesicants, pressors, TPN, chemo, nor any other infusion that requires central venous access. This pt is not an appropriate candidate at this time for a PICC. A 20G Accucath was placed with ultrasound guidance to the left upper arm.     If the pt's condition changes, and central access is required, please re-enter a  consult for the PICC/VAT.

## 2024-12-01 NOTE — PLAN OF CARE
Goal Outcome Evaluation:            Patient to get a midline today. Patient has had a large BM that was diarrhea as well has she vomited.  She has a lot of generalized swelling. Her lungs are very wet. She continues to have nausea. Patient to be NPO for procedure tomorrow at midnight. ABX and blood cultures are ordered but unable to give until Midline is in.

## 2024-12-01 NOTE — PLAN OF CARE
"  Problem: Adult Inpatient Plan of Care  Goal: Plan of Care Review  Outcome: Not Progressing  Flowsheets  Taken 12/1/2024 0451 by Milly Lo RN  Progress: no change  Outcome Evaluation: Patient slept well through the night. Frequently requesting pain medication. When asked about rating pain level patient states \"Im not having pain now but I will later.\" PRN medication administered for nausea and reflux. Patient has not complaints of shortness of air but has had increased oxygen demands through the night. WOCN consulted for BLE wounds. Right arm fistulagram planned for Monday. Heparin gtt infusing. Q2 turns to prevent skin injury. Falls precautions in place.  Taken 11/30/2024 0457 by Graciela De Luna RN  Plan of Care Reviewed With: patient   Goal Outcome Evaluation:           Progress: no change  Outcome Evaluation: Patient slept well through the night. Frequently requesting pain medication. When asked about rating pain level patient states \"Im not having pain now but I will later.\" PRN medication administered for nausea and reflux. Patient has not complaints of shortness of air but has had increased oxygen demands through the night. WOCN consulted for BLE wounds. Right arm fistulagram planned for Monday. Heparin gtt infusing. Q2 turns to prevent skin injury. Falls precautions in place.                             "

## 2024-12-01 NOTE — SIGNIFICANT NOTE
12/01/24 1406   OTHER   Discipline occupational therapist   Rehab Time/Intention   Session Not Performed unable to evaluate, medical status change  (Pt was on hold per nursing. Pt actively vomitting and having diarrhea. Pt scheduled for vascular sx tomorrow and likely won't be appropriate for OT eval until Tuesday. OT to follow up tomorrow as able.)   Therapy Assessment/Plan (PT)   Criteria for Skilled Interventions Met (PT) yes;skilled treatment is necessary   Recommendation   OT - Next Appointment 12/02/24

## 2024-12-01 NOTE — PROGRESS NOTES
Name: Jaja Carcamo ADMIT: 2024   : 1945  PCP: Kvng Guillen MD    MRN: [id number] LOS: 1 days   AGE/SEX: 79 y.o. female  ROOM:  [facility] 272/A     CC: Right arm swelling, IJ DVT  Interval History: No issues overnight. Eating breakfast this morning. No acute complaints. Still with arm swelling.   Subjective   Subjective     Review of Systems  Objective   Objective     Vitals:   Temp:  [97.3 °F (36.3 °C)-98.1 °F (36.7 °C)] 97.7 °F (36.5 °C)  Heart Rate:  [] 95  Resp:  [15-26] 26  BP: (101-148)/() 102/64    I/O this shift:  In: 180 [P.O.:180]  Out: -     Scheduled Meds:     atorvastatin, 10 mg, Oral, Daily  cholestyramine light, 1 packet, Oral, Q24H  DULoxetine, 40 mg, Oral, Daily  famotidine, 20 mg, Intravenous, Q12H  furosemide, 40 mg, Oral, Daily  hydrOXYzine, 25 mg, Oral, Nightly  levothyroxine, 50 mcg, Oral, Q AM  LORazepam, 0.5 mg, Oral, Q6H  metoprolol tartrate, 50 mg, Oral, Q8H  mirtazapine, 15 mg, Oral, Nightly  polyethylene glycol, 17 g, Oral, Daily  potassium chloride, 10 mEq, Oral, Daily  QUEtiapine, 25 mg, Oral, Nightly  sennosides-docusate, 1 tablet, Oral, BID  sodium chloride, 10 mL, Intravenous, Q12H  tacrolimus, 0.5 mg, Oral, BID      IV Meds:   heparin, 18 Units/kg/hr, Last Rate: 26 Units/kg/hr (24 0725)        Physical Exam  Right arm edematous  Pulsatile thrill in right arm fistula.     Data Reviewed:  CBC    Results from last 7 days   Lab Units 24  0557 24  0027 24  1307   WBC 10*3/mm3 8.63 12.15* 17.75*   HEMOGLOBIN g/dL 8.0* 7.7* 8.9*   PLATELETS 10*3/mm3 185 158 169     BMP   Results from last 7 days   Lab Units 24  0557 24  0027 24  1307   SODIUM mmol/L 136 136 137   POTASSIUM mmol/L 4.8 4.2 4.9   CHLORIDE mmol/L 100 101 100   CO2 mmol/L 26.4 27.8 29.1*   BUN mg/dL 21 16 13   CREATININE mg/dL 0.87 0.73 0.71   GLUCOSE mg/dL 162* 120* 102*   MAGNESIUM mg/dL 1.9 1.9 1.9     Cr Clearance Estimated Creatinine Clearance: 58.4  "mL/min (by C-G formula based on SCr of 0.87 mg/dL).  Coag   Results from last 7 days   Lab Units 12/01/24 0557 11/30/24  1547 11/30/24  0705 11/30/24 0027 11/29/24 1710 11/29/24  1307   INR  1.85*  --   --  1.91*  --  2.22*   APTT seconds 56.5* 86.2* 81.1* 47.5* 36.0* 36.2*     HbA1C No results found for: \"HGBA1C\"  Blood Glucose No results found for: \"POCGLU\"  Infection     CMP   Results from last 7 days   Lab Units 12/01/24  0557 11/30/24 0027 11/29/24  1307   SODIUM mmol/L 136 136 137   POTASSIUM mmol/L 4.8 4.2 4.9   CHLORIDE mmol/L 100 101 100   CO2 mmol/L 26.4 27.8 29.1*   BUN mg/dL 21 16 13   CREATININE mg/dL 0.87 0.73 0.71   GLUCOSE mg/dL 162* 120* 102*     ABG      UA      JV  No results found for: \"POCMETH\", \"POCAMPHET\", \"POCBARBITUR\", \"POCBENZO\", \"POCCOCAINE\", \"POCOPIATES\", \"POCOXYCODO\", \"POCPHENCYC\", \"POCPROPOXY\", \"POCTHC\", \"POCTRICYC\"  Lysis Labs   Results from last 7 days   Lab Units 12/01/24 0557 11/30/24  1547 11/30/24  0705 11/30/24 0027 11/29/24 1710 11/29/24  1307   INR  1.85*  --   --  1.91*  --  2.22*   APTT seconds 56.5* 86.2* 81.1* 47.5* 36.0* 36.2*   HEMOGLOBIN g/dL 8.0*  --   --  7.7*  --  8.9*   PLATELETS 10*3/mm3 185  --   --  158  --  169   CREATININE mg/dL 0.87  --   --  0.73  --  0.71     Radiology(recent) XR Chest 1 View    Result Date: 12/1/2024  Impression: 1.Cardiomegaly and mild to moderate pulmonary vascular congestion. 2.Increasing, dense opacity of the lower 1/3 to 1/2 of the left hemithorax, which may represent increasing atelectasis +/- effusion. Electronically Signed: German Chavira MD  12/1/2024 10:29 AM EST  Workstation ID: [id number]    CT Angiogram Chest Pulmonary Embolism    Result Date: 12/1/2024  Impression: 1.No evidence of pulmonary embolus. 2.Congestive heart failure. 3.Small right and moderate left pleural effusions. 4.Ascites and anasarca. 5.Cholelithiasis. Electronically Signed: Mark Kiran MD  12/1/2024 10:20 AM EST  Workstation ID: [id number]    XR Chest 1 " View    Result Date: 11/29/2024  Impression: 1.  Persistent retrocardiac left lower lobe airspace disease may represent atelectasis or pneumonia. 2.  No new airspace disease is identified. 2. Probable trace bibasilar pleural effusions. 3. Stable cardiomegaly. Electronically Signed: Karie Bustos MD  11/29/2024 1:39 PM EST  Workstation ID: [id number]     Most notable findings include: Hgb 8.0, INR 1.8, no leukocytosis.     Active Hospital Problems:   Active Hospital Problems    Diagnosis  POA    **Acute deep vein thrombosis (DVT) of other vein of right upper extremity [I82.621]  Yes    Acute DVT (deep venous thrombosis) [I82.409]  Yes      Resolved Hospital Problems   No resolved problems to display.      Assessment & Plan     Assessment / Plan     Jaja Carcamo is a 79 y.o. female with right arm swelling and right internal jugular vein DVT.  Patient has a right arm fistula on that side that has not been used in 8 years.  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  I think this could likely be successfully managed with a fistulogram with possible intervention.    Will plan for right arm fistulagram. She has been scheduled for Tuesday morning.   On heparin gtt for her atrial flutter.   Dr. Clay recommendations for perioperative hydration noted. Will order normal saline  to start tomorrow at midnight (0001 on 12/3/24).    Paulino Alva II, MD  12/01/24  11:00 EST  Available via Secure Chat during the day for non-urgent issues.  After hours and for urgent issues please call the office at [phone]

## 2024-12-01 NOTE — PROGRESS NOTES
Chart reviewed.  Renal function remains stable  Creatinine 0.8 which is near baseline  She had a CT chest PE protocol this morning that did not show pulmonary embolus but she will need some IV hydration following that scan today  Noted plans for fistulogram on Tuesday.  Normal saline IV fluids to start tomorrow night at midnight  Will follow-up a.m. labs    Her recent discharge notes indicated that she was supposed to be on Prograf 1 mg twice daily along with daily prednisone 5 mg.  Will plan to restart the prednisone and adjust the Prograf dose  Hold Bumex and potassium while hydrating.  Potassium 4.8 today    Follow-up a.m. labs

## 2024-12-01 NOTE — CONSULTS
[facility]: [facility]         CONSULT NOTE    Patient Identification:  Jaja Carcamo  79 y.o.  female  1945  [id number]            Requesting physician: Attending physician    Reason for Consultation: Hypoxia      History of Present Illness:  79-year-old female admitted on 11/29/2024 with right arm swelling and pain, Right IJ DVT, brachial artery aneurysmal dilation/AV fistula  History of A-fib with RVR was on Eliquis as an outpatient  History of COPD  History of renal transplant  Hypothyroidism    Assessment:    Hypoxic respiratory insufficiency multifactorial     right IJ DVT, brachial artery aneurysmal dilation/AV fistula  No PE on CAT scan  Pulm edema with bilateral pleural effusion left > right  A-fib with RVR was on Eliquis as an outpatient    Pulmonary hypertension  2D echo 10/4/2024  EF 58 % (Biplane Amato's method).   Severe biatrial enlargement.  moderate mitral regurgitation. moderate tricuspid regurgitation.   Calculated pulmonary systolic pressure of 72 mmHg.   Dilated inferior vena cava consistent with a mean right atrial pressure of 15  mmHg.     ? 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 difficile 6/7/2024  History of ESBL E. coli and 6/5/2024  Hypothyroidism  CAD  History of DVT     Pulmonary hypertension  HTN      Recommendations:    Check respiratory panel  Due to excessive sleepiness will change her Cymbalta to 20 mg daily instead of 40 mg  Change p.o. Ativan from scheduled every 6 hours to as needed every 6 hours    *Low-dose Revatio 10 mg 3 times daily and monitor blood pressure  No nitrates    Oxygen titration currently on 15 L  Diuresis on Lasix 40 mg daily followed by renal    Immunosuppressive agents Prograf for renal transplant monitoring level    Anticoagulation on full dose IV heparin                    Review of Sytems:  Review of Systems   Respiratory:  Positive for cough and shortness of  breath. Negative for wheezing and stridor.    Cardiovascular:  Positive for palpitations and leg swelling. Negative for chest pain.       Past Medical History:  Past Medical History:   Diagnosis Date    Allergic rhinitis 04/14/2015    Asthma 08/10/2017    Atrial flutter 05/11/2017    Chronic diarrhea 04/15/2019    Chronic hypoxemic respiratory failure 01/18/2024    Chronic pain 02/03/2015    COPD (chronic obstructive pulmonary disease)     COVID-19 virus detected 08/11/2022    Cytokine release syndrome, grade 1 08/16/2022    Edema of both lower extremities due to peripheral venous insufficiency 03/12/2021    ESRD on hemodialysis 05/22/2013    Essential (primary) hypertension 03/03/2022    Fracture of ulnar styloid 05/19/2022    Fracture of unspecified carpal bone, right wrist, subsequent encounter for fracture with routine healing 03/03/2022    Gastroesophageal reflux disease 11/12/2020    Gout 08/10/2017    Hearing loss 08/10/2017    History of appendectomy     History of DVT (deep vein thrombosis) 11/12/2020    History of kidney transplant 06/30/2017    History of lobectomy of lung 01/18/2024 03/08/2016:  RIGHT Lower Lobe Mass--> Right Video-assisted thoracoscopy with a moderate-to-large wedge resection of the RLL (by Dr. Haris Mcconnell @ [facility])--> Poorly differentiated carcinoma of the RLL.      History of repair of hip joint 05/21/2013    History of suicide attempt 05/20/2024    Hyperlipidemia 08/11/2014    Hypothyroidism, unspecified 03/03/2022    Infection due to extended spectrum beta lactamase (ESBL) producing bacteria 03/11/2016    No A2K system hx. +ESBL E coli urine on 3/11/16.      Irritable bowel syndrome 04/15/2019    Long term (current) use of immunosuppressive biologic 04/28/2021    Long term current use of anticoagulant therapy 01/18/2024    Malignant neoplasm of lung 08/10/2017    Menopausal flushing 08/30/2021    Mitral valve regurgitation 07/06/2015    Mixed anxiety and depressive  disorder 04/30/2015    Non-small cell lung cancer 08/10/2017    Nonobstructive atherosclerosis of coronary artery 06/02/2019 08/12/2022: CATH: [facility]: NSTEMI assoc with Covid-19: LM:-nl;  LAD: diffuse, Ca++; 30%; CIRC: Dominant. Normal; RCA: small; 50% diffuse, proximal and mid-segment.      NSTEMI (non-ST elevated myocardial infarction) 08/11/2022    Obsessive-compulsive disorder 04/15/2019    Osteoarthritis 05/20/2024    Paroxysmal atrial fibrillation 05/11/2017    Paroxysmal supraventricular tachycardia 05/11/2017    Peripheral neuropathy 08/11/2014    Personal history of peptic ulcer disease 11/12/2020    Postoperative anemia due to acute blood loss 05/22/2013    Right wrist fracture 03/01/2022    Seasonal allergies 08/10/2017    Secondary hyperparathyroidism of renal origin 09/14/2015    Tricuspid valve regurgitation 03/15/2017    Ulcer of lower extremity 08/30/2021    Unspecified acute appendicitis 03/03/2022    Valvular heart disease 05/20/2024    Wegener's granulomatosis with renal involvement 03/03/2022       Past Surgical History:  Past Surgical History:   Procedure Laterality Date    APPENDECTOMY      BREAST SURGERY Left     cysts rmeoved    CARDIAC CATHETERIZATION Right 08/12/2022    Procedure: Left Heart Cath and coronary angiogram;  Surgeon: Jak Gavin MD;  Location: [facility] CATH INVASIVE LOCATION;  Service: Cardiology;  Laterality: Right;    CLOSED REDUCTION WRIST FRACTURE Right 03/01/2022    Procedure: WRIST CLOSED REDUCTION;  Surgeon: Gaudencio Townsend MD;  Location: [facility] MAIN OR;  Service: Orthopedics;  Laterality: Right;    CYSTOSCOPY      HIP ARTHROPLASTY      HYSTERECTOMY      LUNG LOBECTOMY Right     TRANSPLANTATION RENAL          Home Meds:  Medications Prior to Admission   Medication Sig Dispense Refill Last Dose/Taking    acetaminophen (Tylenol) 325 MG tablet Take 2 tablets by mouth Every 4 (Four) Hours As Needed for Fever or Mild Pain. Indications: Pain   Unknown    albuterol  sulfate  (90 Base) MCG/ACT inhaler Inhale 2 puffs Every 6 (Six) Hours As Needed for Wheezing.   Unknown    apixaban (ELIQUIS) 5 MG tablet tablet Take 1 tablet by mouth Every 12 (Twelve) Hours. 60 tablet 0 Unknown    colestipol (Colestid) 1 g tablet Take 1 tablet by mouth Daily. Indications: High Amount of Cholesterol in the Blood   Unknown    diazePAM (Valium) 5 MG tablet Take 5 mg by mouth Every 12 (Twelve) Hours As Needed for Anxiety or Sleep. Indications: Feeling Anxious       DULoxetine HCl 40 MG capsule delayed-release particles Take 1 capsule by mouth Daily. Indications: Major Depressive Disorder   Unknown    furosemide (LASIX) 40 MG tablet Take 1 tablet by mouth Daily. (Patient taking differently: Take 1 tablet by mouth 2 (Two) Times a Day. Indications: Cardiac Failure) 30 tablet 1 Unknown    HYDROcodone-acetaminophen (NORCO) 5-325 MG per tablet Take 1 tablet by mouth Every 6 (Six) Hours As Needed for Severe Pain. Indications: Pain   Unknown    hydrOXYzine (ATARAX) 25 MG tablet Take 1 tablet by mouth Every Night.   Unknown    levothyroxine (SYNTHROID, LEVOTHROID) 100 MCG tablet Take 1 tablet by mouth Daily. Indications: Underactive Thyroid   Unknown    LORazepam (ATIVAN) 0.5 MG tablet Take 1 tablet by mouth Every 6 (Six) Hours.   Unknown    metoprolol tartrate (LOPRESSOR) 50 MG tablet Take 1 tablet by mouth 3 times a day.   Unknown    mirtazapine (REMERON) 15 MG tablet Take 1 tablet by mouth Every Night.       O2 (OXYGEN) Inhale 3 L/min Continuous. Indications: Prevention of COPD Worsening   Unknown    ondansetron (ZOFRAN) 4 MG tablet Take 1 tablet by mouth Every 8 (Eight) Hours As Needed for Nausea or Vomiting.   Unknown    polyethylene glycol (MiraLax) 17 GM/SCOOP powder Take 17 g by mouth Daily.   Unknown    potassium chloride (MICRO-K) 10 MEQ CR capsule Take 1 capsule by mouth Daily.   Unknown    predniSONE (DELTASONE) 5 MG tablet Take 1 tablet by mouth Daily. Indications: ACUTE EXACERBATION OF  "COPD (INACTIVE)   Unknown    QUEtiapine (SEROquel) 25 MG tablet Take 1 tablet by mouth Every Night.   Unknown    sennosides-docusate (PERICOLACE) 8.6-50 MG per tablet Take 1 tablet by mouth 2 (Two) Times a Day.   Unknown    simvastatin (ZOCOR) 20 MG tablet Take 1 tablet by mouth Every Night.   Unknown    tacrolimus (PROGRAF) 0.5 MG capsule Take 1 capsule by mouth 2 (Two) Times a Day. 60 capsule 1 Unknown    vitamin B-12 (CYANOCOBALAMIN) 1000 MCG tablet Take 1 tablet by mouth Daily.   Unknown    vitamin D (ERGOCALCIFEROL) 1.25 MG (04158 UT) capsule capsule Take 1 capsule by mouth 1 (One) Time Per Week.   Unknown       Allergies:  Allergies   Allergen Reactions    Zolpidem Other (See Comments), Unknown (See Comments) and Unknown - High Severity     KEPT HER AWAKE  KEPT HER AWAKE  KEPT HER AWAKE  KEPT HER AWAKE         Social History:   Social History     Socioeconomic History    Marital status:    Tobacco Use    Smoking status: Former     Passive exposure: Never    Smokeless tobacco: Never   Vaping Use    Vaping status: Former   Substance and Sexual Activity    Alcohol use: Never    Drug use: Never    Sexual activity: Defer       Family History:  Family History   Problem Relation Age of Onset    No Known Problems Mother     No Known Problems Father        Physical Exam:  /64 (BP Location: Left arm)   Pulse 95   Temp 97.8 °F (36.6 °C) (Oral)   Resp 16   Ht 167.6 cm (66\")   Wt 81.8 kg (180 lb 5.4 oz)   SpO2 100%   BMI 29.11 kg/m²  Body mass index is 29.11 kg/m². 100% 81.8 kg (180 lb 5.4 oz)  Physical Exam  Cardiovascular:      Heart sounds: Murmur heard.      No gallop.   Pulmonary:      Effort: No respiratory distress.      Breath sounds: No stridor. Rhonchi and rales present. No wheezing.   Chest:      Chest wall: No tenderness.         LABS:  Lab Results   Component Value Date    CALCIUM 8.9 12/01/2024    PHOS 2.5 06/08/2024     Results from last 7 days   Lab Units 12/01/24  0557 11/30/24  0027 " 11/29/24  1307   MAGNESIUM mg/dL 1.9 1.9 1.9   SODIUM mmol/L 136 136 137   POTASSIUM mmol/L 4.8 4.2 4.9   CHLORIDE mmol/L 100 101 100   CO2 mmol/L 26.4 27.8 29.1*   BUN mg/dL 21 16 13   CREATININE mg/dL 0.87 0.73 0.71   GLUCOSE mg/dL 162* 120* 102*   CALCIUM mg/dL 8.9 9.0 9.4   WBC 10*3/mm3 8.63 12.15* 17.75*   HEMOGLOBIN g/dL 8.0* 7.7* 8.9*   PLATELETS 10*3/mm3 185 158 169     Lab Results   Component Value Date    CKTOTAL 208 (H) 05/17/2024    TROPONINI <0.03 08/08/2022    TROPONINT 35 (H) 05/24/2024                         Results from last 7 days   Lab Units 12/01/24  0557 11/30/24  0027 11/29/24  1307   INR  1.85* 1.91* 2.22*         Lab Results   Component Value Date    TSH 17.200 (H) 11/29/2024     Estimated Creatinine Clearance: 58.4 mL/min (by C-G formula based on SCr of 0.87 mg/dL).         Imaging:  Imaging Results (Last 24 Hours)       Procedure Component Value Units Date/Time    XR Chest 1 View [[id number]] Collected: 12/01/24 1027     Updated: 12/01/24 1031    Narrative:      XR CHEST 1 VW    Date of Exam: 12/1/2024 8:24 AM EST    Indication: decrease o2    Comparison: 11/29/2024    Findings:  Heart shadow is markedly enlarged. There is persistent mild to moderate pulmonary vascular congestion. There is increasing, dense opacity of the lower 1/3-1/2 of the left hemithorax, and slightly increased patchy disease of the right and left midlung. No   pneumothorax is identified.      Impression:      Impression:  1.Cardiomegaly and mild to moderate pulmonary vascular congestion.  2.Increasing, dense opacity of the lower 1/3 to 1/2 of the left hemithorax, which may represent increasing atelectasis +/- effusion.        Electronically Signed: German Chavira MD    12/1/2024 10:29 AM EST    Workstation ID: [id number]    CT Angiogram Chest Pulmonary Embolism [[id number]] Collected: 12/01/24 1014     Updated: 12/01/24 1022    Narrative:      CT ANGIOGRAM CHEST PULMONARY EMBOLISM    Date of Exam: 12/1/2024 8:30 AM  EST    Indication: Dyspnea.    Comparison: Chest CT 5/19/2024    Technique: Axial CT images were obtained of the chest after the uneventful intravenous administration of iodinated contrast utilizing pulmonary embolism protocol.  Sagittal and coronal reconstructions were performed.  Automated exposure control and   iterative reconstruction methods were used.      Findings:  PULMONARY VASCULATURE: Pulmonary arteries are widely patent without evidence of embolus.  Main pulmonary artery is normal in size. No evidence of right heart strain.    MEDIASTINUM:Unremarkable. Heart is enlarged. Aortic size is normal. No aortic dissection identified. No mass nor pericardial effusion.  CORONARY ARTERIES: There is calcified atherosclerotic disease.  LUNGS: There is interstitial edema with mild central edema. There is compressive atelectasis. There are right lower lobe postoperative changes. No new nodule identified. There is emphysema.  PLEURAL SPACE: Small right and moderate left pleural effusions.  LYMPH NODES: There are no pathologically enlarged lymph nodes.    UPPER ABDOMEN: There are gallstones. There is ascites.    OSSEOUS STRUCTURES: Appropriate for age with no acute process identified.    There is anasarca.      Impression:      Impression:  1.No evidence of pulmonary embolus.  2.Congestive heart failure.  3.Small right and moderate left pleural effusions.  4.Ascites and anasarca.  5.Cholelithiasis.        Electronically Signed: Mark Kiran MD    12/1/2024 10:20 AM EST    Workstation ID: [id number]              Current Meds:   SCHEDULE  atorvastatin, 10 mg, Oral, Daily  cholestyramine light, 1 packet, Oral, Q24H  DULoxetine, 40 mg, Oral, Daily  famotidine, 20 mg, Intravenous, Q12H  furosemide, 40 mg, Oral, Daily  hydrOXYzine, 25 mg, Oral, Nightly  levothyroxine, 50 mcg, Oral, Q AM  LORazepam, 0.5 mg, Oral, Q6H  metoprolol tartrate, 50 mg, Oral, Q8H  mirtazapine, 15 mg, Oral, Nightly  polyethylene glycol, 17 g, Oral,  Daily  potassium chloride, 10 mEq, Oral, Daily  QUEtiapine, 25 mg, Oral, Nightly  sennosides-docusate, 1 tablet, Oral, BID  sodium chloride, 10 mL, Intravenous, Q12H  tacrolimus, 0.5 mg, Oral, BID      Infusions  heparin, 18 Units/kg/hr, Last Rate: 26 Units/kg/hr (12/01/24 0725)  [START ON 12/3/2024] sodium chloride, 100 mL/hr      PRNs    senna-docusate sodium **AND** polyethylene glycol **AND** bisacodyl **AND** bisacodyl    diazePAM    HYDROcodone-acetaminophen    influenza vaccine    nitroglycerin    ondansetron    prochlorperazine    [COMPLETED] Insert Peripheral IV **AND** sodium chloride    sodium chloride    sodium chloride        Evelina Ace MD  12/1/2024  11:45 EST      Much of this encounter note is an electronic transcription/translation of spoken language to printed text using Dragon Software.

## 2024-12-02 LAB
ALBUMIN SERPL-MCNC: 2.6 G/DL (ref 3.5–5.2)
ALBUMIN SERPL-MCNC: 2.7 G/DL (ref 3.5–5.2)
ALP SERPL-CCNC: 60 U/L (ref 39–117)
ALT SERPL W P-5'-P-CCNC: 5 U/L (ref 1–33)
ANION GAP SERPL CALCULATED.3IONS-SCNC: 8.5 MMOL/L (ref 5–15)
ANISOCYTOSIS BLD QL: ABNORMAL
APTT PPP: 122 SECONDS (ref 22.7–35.4)
APTT PPP: 34.4 SECONDS (ref 22.7–35.4)
APTT PPP: 90 SECONDS (ref 22.7–35.4)
ARTERIAL PATENCY WRIST A: POSITIVE
AST SERPL-CCNC: 18 U/L (ref 1–32)
ATMOSPHERIC PRESS: ABNORMAL MM[HG]
BASE EXCESS BLDA CALC-SCNC: 2 MMOL/L (ref 0–3)
BDY SITE: ABNORMAL
BILIRUB CONJ SERPL-MCNC: 0.3 MG/DL (ref 0–0.3)
BILIRUB INDIRECT SERPL-MCNC: 0.3 MG/DL
BILIRUB SERPL-MCNC: 0.6 MG/DL (ref 0–1.2)
BUN SERPL-MCNC: 24 MG/DL (ref 8–23)
BUN/CREAT SERPL: 22.2 (ref 7–25)
CALCIUM SPEC-SCNC: 8.6 MG/DL (ref 8.6–10.5)
CHLORIDE SERPL-SCNC: 101 MMOL/L (ref 98–107)
CO2 BLDA-SCNC: 30.7 MMOL/L (ref 22–29)
CO2 SERPL-SCNC: 27.5 MMOL/L (ref 22–29)
CREAT SERPL-MCNC: 1.08 MG/DL (ref 0.57–1)
D-LACTATE SERPL-SCNC: 1.8 MMOL/L (ref 0.5–2)
D-LACTATE SERPL-SCNC: 2.1 MMOL/L (ref 0.5–2)
DEPRECATED RDW RBC AUTO: 61.2 FL (ref 37–54)
EGFRCR SERPLBLD CKD-EPI 2021: 53 ML/MIN/1.73
ERYTHROCYTE [DISTWIDTH] IN BLOOD BY AUTOMATED COUNT: 18.2 % (ref 12.3–15.4)
GAS FLOW AIRWAY: 3 LPM
GLUCOSE SERPL-MCNC: 81 MG/DL (ref 65–99)
HCO3 BLDA-SCNC: 29 MMOL/L (ref 21–28)
HCT VFR BLD AUTO: 28.5 % (ref 34–46.6)
HEMODILUTION: YES
HGB BLD-MCNC: 8.1 G/DL (ref 12–15.9)
INHALED O2 CONCENTRATION: 33 %
INR PPP: 1.76 (ref 0.9–1.1)
LYMPHOCYTES # BLD MANUAL: 0.13 10*3/MM3 (ref 0.7–3.1)
LYMPHOCYTES NFR BLD MANUAL: 5 % (ref 5–12)
MAGNESIUM SERPL-MCNC: 1.8 MG/DL (ref 1.6–2.4)
MCH RBC QN AUTO: 26.2 PG (ref 26.6–33)
MCHC RBC AUTO-ENTMCNC: 28.4 G/DL (ref 31.5–35.7)
MCV RBC AUTO: 92.2 FL (ref 79–97)
METAMYELOCYTES NFR BLD MANUAL: 1 % (ref 0–0)
MODALITY: ABNORMAL
MONOCYTES # BLD: 0.64 10*3/MM3 (ref 0.1–0.9)
NEUTROPHILS # BLD AUTO: 11.96 10*3/MM3 (ref 1.7–7)
NEUTROPHILS NFR BLD MANUAL: 70 % (ref 42.7–76)
NEUTS BAND NFR BLD MANUAL: 23 % (ref 0–5)
PCO2 BLDA: 55.4 MM HG (ref 35–48)
PH BLDA: 7.33 PH UNITS (ref 7.35–7.45)
PHOSPHATE SERPL-MCNC: 4.1 MG/DL (ref 2.5–4.5)
PLAT MORPH BLD: NORMAL
PLATELET # BLD AUTO: 200 10*3/MM3 (ref 140–450)
PMV BLD AUTO: 10.1 FL (ref 6–12)
PO2 BLD: 177 MM[HG] (ref 0–500)
PO2 BLDA: 58.5 MM HG (ref 83–108)
POIKILOCYTOSIS BLD QL SMEAR: ABNORMAL
POTASSIUM SERPL-SCNC: 4.7 MMOL/L (ref 3.5–5.2)
PROT SERPL-MCNC: 5.1 G/DL (ref 6–8.5)
PROTHROMBIN TIME: 20.4 SECONDS (ref 11.7–14.2)
RBC # BLD AUTO: 3.09 10*6/MM3 (ref 3.77–5.28)
SAO2 % BLDCOA: 87.2 % (ref 94–98)
SCAN SLIDE: NORMAL
SODIUM SERPL-SCNC: 137 MMOL/L (ref 136–145)
VARIANT LYMPHS NFR BLD MANUAL: 1 % (ref 19.6–45.3)
WBC MORPH BLD: NORMAL
WBC NRBC COR # BLD AUTO: 12.86 10*3/MM3 (ref 3.4–10.8)

## 2024-12-02 PROCEDURE — P9047 ALBUMIN (HUMAN), 25%, 50ML: HCPCS | Performed by: INTERNAL MEDICINE

## 2024-12-02 PROCEDURE — 63710000001 PREDNISONE PER 5 MG: Performed by: INTERNAL MEDICINE

## 2024-12-02 PROCEDURE — 85610 PROTHROMBIN TIME: CPT | Performed by: INTERNAL MEDICINE

## 2024-12-02 PROCEDURE — 25010000002 PIPERACILLIN SOD-TAZOBACTAM PER 1 G: Performed by: INTERNAL MEDICINE

## 2024-12-02 PROCEDURE — 85007 BL SMEAR W/DIFF WBC COUNT: CPT | Performed by: INTERNAL MEDICINE

## 2024-12-02 PROCEDURE — 25010000002 ALBUMIN HUMAN 25% PER 50 ML: Performed by: INTERNAL MEDICINE

## 2024-12-02 PROCEDURE — 83605 ASSAY OF LACTIC ACID: CPT | Performed by: INTERNAL MEDICINE

## 2024-12-02 PROCEDURE — 25010000002 ALBUMIN HUMAN 25% PER 50 ML: Performed by: NURSE PRACTITIONER

## 2024-12-02 PROCEDURE — 84100 ASSAY OF PHOSPHORUS: CPT | Performed by: INTERNAL MEDICINE

## 2024-12-02 PROCEDURE — 25010000002 HEPARIN (PORCINE) 25000-0.45 UT/250ML-% SOLUTION: Performed by: INTERNAL MEDICINE

## 2024-12-02 PROCEDURE — P9047 ALBUMIN (HUMAN), 25%, 50ML: HCPCS | Performed by: NURSE PRACTITIONER

## 2024-12-02 PROCEDURE — 87040 BLOOD CULTURE FOR BACTERIA: CPT | Performed by: INTERNAL MEDICINE

## 2024-12-02 PROCEDURE — 97166 OT EVAL MOD COMPLEX 45 MIN: CPT

## 2024-12-02 PROCEDURE — 83735 ASSAY OF MAGNESIUM: CPT | Performed by: INTERNAL MEDICINE

## 2024-12-02 PROCEDURE — 63710000001 TACROLIMUS PER 1 MG: Performed by: INTERNAL MEDICINE

## 2024-12-02 PROCEDURE — 25010000002 AMPICILLIN-SULBACTAM PER 1.5 G: Performed by: INTERNAL MEDICINE

## 2024-12-02 PROCEDURE — 82248 BILIRUBIN DIRECT: CPT | Performed by: INTERNAL MEDICINE

## 2024-12-02 PROCEDURE — 85730 THROMBOPLASTIN TIME PARTIAL: CPT | Performed by: INTERNAL MEDICINE

## 2024-12-02 PROCEDURE — 25810000003 SODIUM CHLORIDE 0.9 % SOLUTION: Performed by: INTERNAL MEDICINE

## 2024-12-02 PROCEDURE — 85025 COMPLETE CBC W/AUTO DIFF WBC: CPT | Performed by: INTERNAL MEDICINE

## 2024-12-02 PROCEDURE — 99232 SBSQ HOSP IP/OBS MODERATE 35: CPT | Performed by: NURSE PRACTITIONER

## 2024-12-02 PROCEDURE — 80053 COMPREHEN METABOLIC PANEL: CPT | Performed by: INTERNAL MEDICINE

## 2024-12-02 RX ORDER — ALBUMIN (HUMAN) 12.5 G/50ML
25 SOLUTION INTRAVENOUS ONCE
Status: COMPLETED | OUTPATIENT
Start: 2024-12-02 | End: 2024-12-02

## 2024-12-02 RX ORDER — FAMOTIDINE 20 MG/1
20 TABLET, FILM COATED ORAL DAILY
Status: DISCONTINUED | OUTPATIENT
Start: 2024-12-03 | End: 2024-12-07

## 2024-12-02 RX ORDER — NYSTATIN 100000 [USP'U]/G
POWDER TOPICAL EVERY 12 HOURS SCHEDULED
Status: DISCONTINUED | OUTPATIENT
Start: 2024-12-02 | End: 2024-12-19 | Stop reason: HOSPADM

## 2024-12-02 RX ORDER — ACETAMINOPHEN 325 MG/1
650 TABLET ORAL EVERY 6 HOURS PRN
Status: DISCONTINUED | OUTPATIENT
Start: 2024-12-02 | End: 2024-12-19 | Stop reason: HOSPADM

## 2024-12-02 RX ORDER — SODIUM CHLORIDE 9 MG/ML
100 INJECTION, SOLUTION INTRAVENOUS CONTINUOUS
Status: DISPENSED | OUTPATIENT
Start: 2024-12-02 | End: 2024-12-03

## 2024-12-02 RX ORDER — MIDODRINE HYDROCHLORIDE 5 MG/1
15 TABLET ORAL
Status: DISCONTINUED | OUTPATIENT
Start: 2024-12-02 | End: 2024-12-19 | Stop reason: HOSPADM

## 2024-12-02 RX ORDER — MIDODRINE HYDROCHLORIDE 5 MG/1
10 TABLET ORAL
Status: DISCONTINUED | OUTPATIENT
Start: 2024-12-02 | End: 2024-12-02

## 2024-12-02 RX ORDER — SODIUM CHLORIDE 9 MG/ML
100 INJECTION, SOLUTION INTRAVENOUS CONTINUOUS
Status: DISCONTINUED | OUTPATIENT
Start: 2024-12-03 | End: 2024-12-02

## 2024-12-02 RX ORDER — ALBUMIN (HUMAN) 12.5 G/50ML
12.5 SOLUTION INTRAVENOUS ONCE
Status: COMPLETED | OUTPATIENT
Start: 2024-12-02 | End: 2024-12-02

## 2024-12-02 RX ADMIN — HEPARIN SODIUM 25 UNITS/KG/HR: 10000 INJECTION, SOLUTION INTRAVENOUS at 11:25

## 2024-12-02 RX ADMIN — ACETAMINOPHEN 650 MG: 325 TABLET, FILM COATED ORAL at 22:11

## 2024-12-02 RX ADMIN — MIRTAZAPINE 15 MG: 15 TABLET, FILM COATED ORAL at 21:23

## 2024-12-02 RX ADMIN — SILDENAFIL 10 MG: 20 TABLET ORAL at 17:06

## 2024-12-02 RX ADMIN — ALBUMIN (HUMAN) 25 G: 0.25 INJECTION, SOLUTION INTRAVENOUS at 01:58

## 2024-12-02 RX ADMIN — TACROLIMUS 1 MG: 1 CAPSULE ORAL at 21:23

## 2024-12-02 RX ADMIN — SODIUM CHLORIDE 1000 ML: 9 INJECTION, SOLUTION INTRAVENOUS at 05:45

## 2024-12-02 RX ADMIN — SILDENAFIL 10 MG: 20 TABLET ORAL at 08:29

## 2024-12-02 RX ADMIN — HYDROXYZINE HYDROCHLORIDE 25 MG: 25 TABLET ORAL at 21:24

## 2024-12-02 RX ADMIN — CHOLESTYRAMINE 4 G: 4 POWDER, FOR SUSPENSION ORAL at 08:27

## 2024-12-02 RX ADMIN — Medication 10 ML: at 21:19

## 2024-12-02 RX ADMIN — MIDODRINE HYDROCHLORIDE 15 MG: 5 TABLET ORAL at 17:06

## 2024-12-02 RX ADMIN — ALBUMIN (HUMAN) 12.5 G: 0.25 INJECTION, SOLUTION INTRAVENOUS at 13:48

## 2024-12-02 RX ADMIN — Medication 10 ML: at 02:37

## 2024-12-02 RX ADMIN — PREDNISONE 5 MG: 5 TABLET ORAL at 08:28

## 2024-12-02 RX ADMIN — LEVOTHYROXINE SODIUM 50 MCG: 50 TABLET ORAL at 06:33

## 2024-12-02 RX ADMIN — FAMOTIDINE 20 MG: 10 INJECTION INTRAVENOUS at 08:28

## 2024-12-02 RX ADMIN — QUETIAPINE FUMARATE 25 MG: 25 TABLET ORAL at 21:24

## 2024-12-02 RX ADMIN — SODIUM CHLORIDE 100 ML/HR: 9 INJECTION, SOLUTION INTRAVENOUS at 13:48

## 2024-12-02 RX ADMIN — AMPICILLIN SODIUM AND SULBACTAM SODIUM 1.5 G: 1; .5 INJECTION, POWDER, FOR SOLUTION INTRAMUSCULAR; INTRAVENOUS at 02:38

## 2024-12-02 RX ADMIN — PIPERACILLIN AND TAZOBACTAM 3.38 G: 3; .375 INJECTION, POWDER, FOR SOLUTION INTRAVENOUS at 08:28

## 2024-12-02 RX ADMIN — PIPERACILLIN AND TAZOBACTAM 3.38 G: 3; .375 INJECTION, POWDER, FOR SOLUTION INTRAVENOUS at 21:19

## 2024-12-02 RX ADMIN — MIDODRINE HYDROCHLORIDE 10 MG: 5 TABLET ORAL at 07:06

## 2024-12-02 RX ADMIN — NYSTATIN: 100000 POWDER TOPICAL at 21:24

## 2024-12-02 RX ADMIN — TACROLIMUS 1 MG: 1 CAPSULE ORAL at 08:28

## 2024-12-02 RX ADMIN — DULOXETINE HYDROCHLORIDE 20 MG: 20 CAPSULE, DELAYED RELEASE ORAL at 08:28

## 2024-12-02 RX ADMIN — HEPARIN SODIUM 29 UNITS/KG/HR: 10000 INJECTION, SOLUTION INTRAVENOUS at 18:48

## 2024-12-02 RX ADMIN — ATORVASTATIN CALCIUM 10 MG: 10 TABLET ORAL at 08:28

## 2024-12-02 RX ADMIN — Medication 10 ML: at 08:29

## 2024-12-02 RX ADMIN — MIDODRINE HYDROCHLORIDE 15 MG: 5 TABLET ORAL at 11:26

## 2024-12-02 RX ADMIN — ALBUMIN (HUMAN) 25 G: 0.25 INJECTION, SOLUTION INTRAVENOUS at 22:11

## 2024-12-02 RX ADMIN — NYSTATIN 1 APPLICATION: 100000 POWDER TOPICAL at 03:06

## 2024-12-02 RX ADMIN — PIPERACILLIN AND TAZOBACTAM 3.38 G: 3; .375 INJECTION, POWDER, FOR SOLUTION INTRAVENOUS at 11:26

## 2024-12-02 NOTE — PLAN OF CARE
Goal Outcome Evaluation:           Progress: declining  Outcome Evaluation: pt c/o pain gave prn meds, triggered sepsis called  he ordered albumin and started her on midodrine, pt rested most of the night, b.c obtained, lactic taken twice, on IV tittratable heparin, Q2 turn for skin health, moisture added to O2

## 2024-12-02 NOTE — THERAPY EVALUATION
Patient Name: Jaja Carcamo  : 1945    MRN: [id number]                              Today's Date: 2024       Admit Date: 2024    Visit Dx:     ICD-10-CM ICD-9-CM   1. Atrial fibrillation with RVR  I48.91 427.31   2. A-V fistula  I77.0 447.0   3. Arm DVT (deep venous thromboembolism), acute, right  I82.621 453.82     Patient Active Problem List   Diagnosis    COPD (chronic obstructive pulmonary disease)    Acute UTI    Atrial fibrillation with rapid ventricular response    Volume overload    Fall    Hypothyroidism, unspecified    Hyperlipidemia    History of suicide attempt    History of lobectomy of lung    Irritable bowel syndrome    History of kidney transplant    Long term (current) use of immunosuppressive biologic    Long term current use of anticoagulant therapy    Menopausal flushing    Mitral valve regurgitation    Tricuspid valve regurgitation    History of lung cancer    Nonobstructive atherosclerosis of coronary artery    Obsessive-compulsive disorder    Osteoarthritis of left hip    Primary osteoarthritis of right hip    Paroxysmal supraventricular tachycardia    Peripheral neuropathy    History of DVT (deep vein thrombosis)    Personal history of peptic ulcer disease    Pulmonary hypertension    Seasonal allergies    History of repair of hip joint    Gout    Gastroesophageal reflux disease    Hearing loss    Edema of both lower extremities due to peripheral venous insufficiency    Mixed anxiety and depressive disorder    Chronic pain    Chronic hypoxemic respiratory failure    Chronic diarrhea    Asthma    Osteoarthritis    Allergic rhinitis    Wegener's granulomatosis with renal involvement    Essential (primary) hypertension    Paroxysmal atrial fibrillation    Valvular heart disease    Acute exacerbation of CHF (congestive heart failure)    UTI (urinary tract infection)    C. difficile colitis    Acute deep vein thrombosis (DVT) of other vein of right upper extremity    Acute DVT  (deep venous thrombosis)     Past Medical History:   Diagnosis Date    Allergic rhinitis 04/14/2015    Asthma 08/10/2017    Atrial flutter 05/11/2017    Chronic diarrhea 04/15/2019    Chronic hypoxemic respiratory failure 01/18/2024    Chronic pain 02/03/2015    COPD (chronic obstructive pulmonary disease)     COVID-19 virus detected 08/11/2022    Cytokine release syndrome, grade 1 08/16/2022    Edema of both lower extremities due to peripheral venous insufficiency 03/12/2021    ESRD on hemodialysis 05/22/2013    Essential (primary) hypertension 03/03/2022    Fracture of ulnar styloid 05/19/2022    Fracture of unspecified carpal bone, right wrist, subsequent encounter for fracture with routine healing 03/03/2022    Gastroesophageal reflux disease 11/12/2020    Gout 08/10/2017    Hearing loss 08/10/2017    History of appendectomy     History of DVT (deep vein thrombosis) 11/12/2020    History of kidney transplant 06/30/2017    History of lobectomy of lung 01/18/2024 03/08/2016:  RIGHT Lower Lobe Mass--> Right Video-assisted thoracoscopy with a moderate-to-large wedge resection of the RLL (by Dr. Haris Mcconnell @ [facility])--> Poorly differentiated carcinoma of the RLL.      History of repair of hip joint 05/21/2013    History of suicide attempt 05/20/2024    Hyperlipidemia 08/11/2014    Hypothyroidism, unspecified 03/03/2022    Infection due to extended spectrum beta lactamase (ESBL) producing bacteria 03/11/2016    No A2K system hx. +ESBL E coli urine on 3/11/16.      Irritable bowel syndrome 04/15/2019    Long term (current) use of immunosuppressive biologic 04/28/2021    Long term current use of anticoagulant therapy 01/18/2024    Malignant neoplasm of lung 08/10/2017    Menopausal flushing 08/30/2021    Mitral valve regurgitation 07/06/2015    Mixed anxiety and depressive disorder 04/30/2015    Non-small cell lung cancer 08/10/2017    Nonobstructive atherosclerosis of coronary artery 06/02/2019     08/12/2022: CATH: [facility]: NSTEMI assoc with Covid-19: LM:-nl;  LAD: diffuse, Ca++; 30%; CIRC: Dominant. Normal; RCA: small; 50% diffuse, proximal and mid-segment.      NSTEMI (non-ST elevated myocardial infarction) 08/11/2022    Obsessive-compulsive disorder 04/15/2019    Osteoarthritis 05/20/2024    Paroxysmal atrial fibrillation 05/11/2017    Paroxysmal supraventricular tachycardia 05/11/2017    Peripheral neuropathy 08/11/2014    Personal history of peptic ulcer disease 11/12/2020    Postoperative anemia due to acute blood loss 05/22/2013    Right wrist fracture 03/01/2022    Seasonal allergies 08/10/2017    Secondary hyperparathyroidism of renal origin 09/14/2015    Tricuspid valve regurgitation 03/15/2017    Ulcer of lower extremity 08/30/2021    Unspecified acute appendicitis 03/03/2022    Valvular heart disease 05/20/2024    Wegener's granulomatosis with renal involvement 03/03/2022     Past Surgical History:   Procedure Laterality Date    APPENDECTOMY      BREAST SURGERY Left     cysts rmeoved    CARDIAC CATHETERIZATION Right 08/12/2022    Procedure: Left Heart Cath and coronary angiogram;  Surgeon: Jak Gavin MD;  Location: [facility] CATH INVASIVE LOCATION;  Service: Cardiology;  Laterality: Right;    CLOSED REDUCTION WRIST FRACTURE Right 03/01/2022    Procedure: WRIST CLOSED REDUCTION;  Surgeon: Gaudencio Townsend MD;  Location: [facility] MAIN OR;  Service: Orthopedics;  Laterality: Right;    CYSTOSCOPY      HIP ARTHROPLASTY      HYSTERECTOMY      LUNG LOBECTOMY Right     TRANSPLANTATION RENAL        General Information       Row Name 12/02/24 1300          OT Time and Intention    Subjective Information complains of;weakness;fatigue;nausea/vomiting;dizziness;pain  -KT     Document Type evaluation  -KT     Mode of Treatment occupational therapy  -KT     Patient Effort adequate  -KT     Symptoms Noted During/After Treatment fatigue;increased pain;nausea  -KT       Row Name 12/02/24 1300          General  Information    Patient Profile Reviewed yes  -KT     Prior Level of Function independent:;ADL's  -KT     Barriers to Rehab medically complex;previous functional deficit;cognitive status  -KT       Row Name 12/02/24 1300          Occupational Profile    Reason for Services/Referral (Occupational Profile) Pt is a 79 y.o. year old female admitted to [facility] with R arm pain. She states she is a high falls risk with intermittent falls but does not recall striking her arm. Pt was found to have RUE DVT and has been on anticoagulant therapy (heparin drip) since 11/29/24. She admitted from [facility] where she is a LTC resident. Pt is lethargic and appears to be a poor historian.    Pmhx significant for chronic HFpEF, atrial fibrillation, COPD, non-obstructive CAD, asthma, HTN, kidney transplant and lung cancer    At baseline, pt is a resident at [facility] per chart, however pt reports that prior to a month ago she lived in a [facility] with a basement and plans to return when able following rehab. Pt states that she was previously independent with ADLs per report, however she also reports that staff at facilities were also helping her with all ADLs. Baseline difficult to determine at this point.    At [facility], pt A&Ox2 and was generally lethargic with low arousal. Pt engaged in hygiene following loose stool BM with dep assist x2 for bowel hygiene and mod/max A for rolling R<>L in bed. Pt declined getting OOB to recliner. Upon sitting up, pt was able to complete g/h (mouth wash for oral care and washing face with a wet cloth) with set up A.  -KT       Row Name 12/02/24 1300          Living Environment    People in Home other (see comments)  admitted from facility, however pt reports that her goal is to return to her [facility] after rehab  -KT       Row Name 12/02/24 1300          Cognition    Orientation Status (Cognition) oriented to;person;place;unable/difficult to assess  -KT       Row Name 12/02/24 1300          Safety  Issues/Impairments Affecting Functional Mobility    Safety Issues Affecting Function (Mobility) ability to follow commands;awareness of need for assistance;insight into deficits/self-awareness;judgment;problem-solving;safety precaution awareness;safety precautions follow-through/compliance;sequencing abilities  -KT     Impairments Affecting Function (Mobility) balance;cognition;endurance/activity tolerance;coordination;motor planning;pain;strength;sensation/sensory awareness  -KT     Cognitive Impairments, Mobility Safety/Performance attention;awareness, need for assistance;insight into deficits/self-awareness;judgment;problem-solving/reasoning;safety precaution awareness;safety precaution follow-through;sequencing abilities  -KT               User Key  (r) = Recorded By, (t) = Taken By, (c) = Cosigned By      Initials Name Provider Type    KT Vandana Wheeler OT Occupational Therapist                     Mobility/ADL's       Row Name 12/02/24 5541          Bed Mobility    Bed Mobility bed mobility (all) activities;rolling left;rolling right;scooting/bridging  -KT     All Activities, [place] (Bed Mobility) moderate assist (50% patient effort);maximum assist (25% patient effort)  -KT     Rolling Left [place] (Bed Mobility) moderate assist (50% patient effort);maximum assist (25% patient effort)  -KT     Rolling Right [place] (Bed Mobility) moderate assist (50% patient effort);maximum assist (25% patient effort)  -KT     Scooting/Bridging [place] (Bed Mobility) dependent (less than 25% patient effort);2 person assist  -KT     Bed Mobility, Safety Issues decreased use of arms for pushing/pulling;decreased use of legs for bridging/pushing;impaired trunk control for bed mobility;cognitive deficits limit understanding  -KT     Assistive Device (Bed Mobility) bed rails;repositioning sheet  -KT       Row Name 12/02/24 9264          Functional Mobility    Patient was able to Ambulate no, other medical  factors prevent ambulation  -KT       Row Name 12/02/24 1309          Activities of Daily Living    BADL Assessment/Intervention upper body dressing;lower body dressing;toileting  -KT       [facility] Name 12/02/24 1309          Upper Body Dressing Assessment/Training    [place] Level (Upper Body Dressing) upper body dressing skills;maximum assist (25% patient effort)  -KT       [facility] Name 12/02/24 1309          Lower Body Dressing Assessment/Training    [place] Level (Lower Body Dressing) lower body dressing skills;dependent (less than 25% patient effort)  -KT       [facility] Name 12/02/24 1309          Toileting Assessment/Training    [place] Level (Toileting) dependent (less than 25% patient effort)  -KT               User Key  (r) = Recorded By, (t) = Taken By, (c) = Cosigned By      Initials Name Provider Type    Vandana Sunshine OT Occupational Therapist                   Obj/Interventions       [facility] Name 12/02/24 1310          Sensory Assessment (Somatosensory)    Sensory Assessment (Somatosensory) sensation intact  -KT       Row Name 12/02/24 1310          Vision Assessment/Intervention    Visual Impairment/Limitations WNL  -KT       Row Name 12/02/24 1310          Range of Motion Comprehensive    General Range of Motion upper extremity range of motion deficits identified  -KT     Comment, General Range of Motion decreased ROM due to impaired strength  -KT       Row Name 12/02/24 1310          Strength Comprehensive (MMT)    General Manual Muscle Testing (MMT) Assessment upper extremity strength deficits identified  -KT     Comment, General Manual Muscle Testing (MMT) Assessment grossly 3/5  -KT               User Key  (r) = Recorded By, (t) = Taken By, (c) = Cosigned By      Initials Name Provider Type    Vandana Sunshine OT Occupational Therapist                   Goals/Plan       [facility] Name 12/02/24 1311          Bed Mobility Goal 1 (OT)    Activity/Assistive Device (Bed Mobility Goal 1, OT)  bed mobility activities, all  -KT     [place] Level/Cues Needed (Bed Mobility Goal 1, OT) moderate assist (50-74% patient effort)  -KT     Time Frame (Bed Mobility Goal 1, OT) long term goal (LTG)  -KT       Row Name 12/02/24 1311          Transfer Goal 1 (OT)    Activity/Assistive Device (Transfer Goal 1, OT) commode, 3-in-1  -KT     [place] Level/Cues Needed (Transfer Goal 1, OT) maximum assist (25-49% patient effort)  -KT     Time Frame (Transfer Goal 1, OT) long term goal (LTG)  -KT       Row Name 12/02/24 1311          Problem Specific Goal 1 (OT)    Problem Specific Goal 1 (OT) Pt will tolerate unsupported sitting EOB for 1-2 minutes to progress endurance, strength and activity tolerance for ADLs.  -KT     Time Frame (Problem Specific Goal 1, OT) long term goal (LTG)  -KT       Row Name 12/02/24 1311          Therapy Assessment/Plan (OT)    Planned Therapy Interventions (OT) activity tolerance training;adaptive equipment training;BADL retraining;functional balance retraining;IADL retraining;occupation/activity based interventions;patient/caregiver education/training;ROM/therapeutic exercise;strengthening exercise;transfer/mobility retraining  -KT               User Key  (r) = Recorded By, (t) = Taken By, (c) = Cosigned By      Initials Name Provider Type    Vandana Sunshine OT Occupational Therapist                   Clinical Impression       Row Name 12/02/24 1310          Pain Assessment    Additional Documentation Pain Scale: FACES Pre/Post-Treatment (Group)  -KT       Row Name 12/02/24 1310          Pain Scale: FACES Pre/Post-Treatment    Pain: FACES Scale, Pretreatment 2-->hurts little bit  -KT     Posttreatment Pain Rating 2-->hurts little bit  -KT       Row Name 12/02/24 1310          Plan of Care Review    Plan of Care Reviewed With patient  -KT     Outcome Evaluation Pt is a 79 y.o. year old female admitted to [facility] with R arm pain. She states she is a high falls risk with intermittent  falls but does not recall striking her arm. Pt was found to have RUE DVT and has been on anticoagulant therapy (heparin drip) since 11/29/24. She admitted from [facility] where she is a LTC resident. Pt is lethargic and appears to be a poor historian.    Pmhx significant for chronic HFpEF, atrial fibrillation, COPD, non-obstructive CAD, asthma, HTN, kidney transplant and lung cancer    At baseline, pt is a resident at [facility] per chart, however pt reports that prior to a month ago she lived in a [facility] with a basement and plans to return when able following rehab. Pt states that she was previously independent with ADLs per report, however she also reports that staff at facilities were also helping her with all ADLs. Baseline difficult to determine at this point.    At eval, pt A&Ox2 and was generally lethargic with low arousal. Pt engaged in hygiene following loose stool BM with dep assist x2 for bowel hygiene and mod/max A for rolling R<>L in bed. Pt declined getting OOB to recliner. Upon sitting up, pt was able to complete g/h (mouth wash for oral care and washing face with a wet cloth) with set up A.  -KT       Row Name 12/02/24 1310          Therapy Assessment/Plan (OT)    Rehab Potential (OT) fair  -KT     Criteria for Skilled Therapeutic Interventions Met (OT) yes;skilled treatment is necessary  -KT     Therapy Frequency (OT) 3 times/wk  -KT     Predicted Duration of Therapy Intervention (OT) until dc  -KT       Row Name 12/02/24 1310          Therapy Plan Review/Discharge Plan (OT)    Anticipated Discharge Disposition (OT) skilled nursing facility  -KT       Row Name 12/02/24 1310          Positioning and Restraints    Pre-Treatment Position in bed  -KT     Post Treatment Position bed  -KT     In Bed call light within reach;encouraged to call for assist;exit alarm on  -KT               User Key  (r) = Recorded By, (t) = Taken By, (c) = Cosigned By      Initials Name Provider Type    LEILA Wheeler  Vandana COTA OT Occupational Therapist                   Outcome Measures       Row Name 12/02/24 1312          How much help from another is currently needed...    Putting on and taking off regular lower body clothing? 1  -KT     Bathing (including washing, rinsing, and drying) 1  -KT     Toileting (which includes using toilet bed pan or urinal) 1  -KT     Putting on and taking off regular upper body clothing 2  -KT     Taking care of personal grooming (such as brushing teeth) 3  -KT     Eating meals 4  -KT     AM-PAC 6 Clicks Score (OT) 12  -KT       Row Name 12/02/24 1312          Functional Assessment    Outcome Measure Options AM-PAC 6 Clicks Daily Activity (OT)  -KT               User Key  (r) = Recorded By, (t) = Taken By, (c) = Cosigned By      Initials Name Provider Type    Vandana Sunshine OT Occupational Therapist                    Occupational Therapy Education        No education to display                  OT Recommendation and Plan  Planned Therapy Interventions (OT): activity tolerance training, adaptive equipment training, BADL retraining, functional balance retraining, IADL retraining, occupation/activity based interventions, patient/caregiver education/training, ROM/therapeutic exercise, strengthening exercise, transfer/mobility retraining  Therapy Frequency (OT): 3 times/wk  Plan of Care Review  Plan of Care Reviewed With: patient  Outcome Evaluation: Pt is a 79 y.o. year old female admitted to [facility] with R arm pain. She states she is a high falls risk with intermittent falls but does not recall striking her arm. Pt was found to have RUE DVT and has been on anticoagulant therapy (heparin drip) since 11/29/24. She admitted from [facility] where she is a LTC resident. Pt is lethargic and appears to be a poor historian.    Pmhx significant for chronic HFpEF, atrial fibrillation, COPD, non-obstructive CAD, asthma, HTN, kidney transplant and lung cancer    At baseline, pt is a resident at  [facility] per chart, however pt reports that prior to a month ago she lived in a [facility] with a basement and plans to return when able following rehab. Pt states that she was previously independent with ADLs per report, however she also reports that staff at facilities were also helping her with all ADLs. Baseline difficult to determine at this point.    At eval, pt A&Ox2 and was generally lethargic with low arousal. Pt engaged in hygiene following loose stool BM with dep assist x2 for bowel hygiene and mod/max A for rolling R<>L in bed. Pt declined getting OOB to recliner. Upon sitting up, pt was able to complete g/h (mouth wash for oral care and washing face with a wet cloth) with set up A.     Time Calculation:   Evaluation Complexity (OT)  Overall Complexity of Evaluation (OT): moderate complexity     Time Calculation- OT       Row Name 12/02/24 1312             Time Calculation- OT    OT Start Time 0940  -KT      OT Stop Time 1009  -KT      OT Time Calculation (min) 29 min  -KT      OT Received On 12/02/24  -LEILA      OT - Next Appointment 12/04/24  -LEILA      OT Goal Re-Cert Due Date 12/16/24  -KT                User Key  (r) = Recorded By, (t) = Taken By, (c) = Cosigned By      Initials Name Provider Type    Vandana Sunshine OT Occupational Therapist                  Therapy Charges for Today       Code Description Service Date Service Provider Modifiers Qty    [id number]  OT EVAL MOD COMPLEXITY 4 12/2/2024 Vandana Wheeler OT GO 1                 Vandana Wheeler OT  12/2/2024

## 2024-12-02 NOTE — PLAN OF CARE
Goal Outcome Evaluation:  Plan of Care Reviewed With: patient           Outcome Evaluation: Pt is a 79 y.o. year old female admitted to [facility] with R arm pain. She states she is a high falls risk with intermittent falls but does not recall striking her arm. Pt was found to have RUE DVT and has been on anticoagulant therapy (heparin drip) since 11/29/24. She admitted from [facility] where she is a LTC resident. Pt is lethargic and appears to be a poor historian.    Pmhx significant for chronic HFpEF, atrial fibrillation, COPD, non-obstructive CAD, asthma, HTN, kidney transplant and lung cancer    At baseline, pt is a resident at [facility] per chart, however pt reports that prior to a month ago she lived in a [facility] with a basement and plans to return when able following rehab. Pt states that she was previously independent with ADLs per report, however she also reports that staff at facilities were also helping her with all ADLs. Baseline difficult to determine at this point.    At eval, pt A&Ox2 and was generally lethargic with low arousal. Pt engaged in hygiene following loose stool BM with dep assist x2 for bowel hygiene and mod/max A for rolling R<>L in bed. Pt declined getting OOB to recliner. Upon sitting up, pt was able to complete g/h (mouth wash for oral care and washing face with a wet cloth) with set up A.    Anticipated Discharge Disposition (OT): skilled nursing facility

## 2024-12-02 NOTE — PROGRESS NOTES
Daily Progress Note        Acute deep vein thrombosis (DVT) of other vein of right upper extremity    Acute DVT (deep venous thrombosis)    Assessment:     Hypoxic respiratory insufficiency multifactorial   negative respiratory panel     right IJ DVT, brachial artery aneurysmal dilation/AV fistula  No PE on CAT scan  Pulm edema with bilateral pleural effusion left > right  A-fib with RVR was on Eliquis as an outpatient     Pulmonary hypertension  2D echo 10/4/2024  EF 58 % (Biplane Amato's method).   Severe biatrial enlargement.  moderate mitral regurgitation. moderate tricuspid regurgitation.   Calculated pulmonary systolic pressure of 72 mmHg.   Dilated inferior vena cava consistent with a mean right atrial pressure of 15  mmHg.      ? 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 difficile 6/7/2024  History of ESBL E. coli and 6/5/2024  Hypothyroidism  CAD  History of DVT     Pulmonary hypertension  HTN        Recommendations:     Low-dose Revatio 10 mg 3 times daily   Blood pressure decreased midodrine 10 mg 3 times daily started we will continue to monitor if blood pressure does not improve will stop sildenafil  No nitrates     Oxygen titration currently on 8 L  Diuresis on Lasix 40 mg daily followed by renal     Immunosuppressive agents Prograf for renal transplant monitoring level     Anticoagulation on full dose IV heparin     Empiric antibiotics currently on IV Zosyn    Due to excessive sleepiness minimize sedatives   Cymbalta to 20 mg daily instead of 40 mg  Changed p.o. Ativan from scheduled every 6 hours to as needed every 6 hours         Chest x-ray 12/1/2024                  LOS: 2 days     Subjective         Objective     Vital signs for last 24 hours:  Vitals:    12/02/24 0526 12/02/24 0559 12/02/24 0700 12/02/24 0707   BP: (!) 63/32 (!) 78/40 93/53 95/53   BP Location: Left arm Left arm  Left arm   Patient Position: Lying  Lying  Sitting   Pulse: 112 (!) 124  (!) 125   Resp: 26      Temp:       TempSrc:       SpO2: 99%   100%   Weight:       Height:           Intake/Output last 3 shifts:  I/O last 3 completed shifts:  In: 860 [P.O.:660; IV Piggyback:200]  Out: 400 [Urine:400]  Intake/Output this shift:  No intake/output data recorded.      Radiology  Imaging Results (Last 24 Hours)       Procedure Component Value Units Date/Time    XR Chest 1 View [[id number]] Collected: 12/01/24 1607     Updated: 12/01/24 1611    Narrative:      XR CHEST 1 VW    Date of Exam: 12/1/2024 3:42 PM EST    Indication: dyspnea    Comparison: 12/1/2024 8:56 a.m.    Findings: Current image is timed 3:42 p.m.    There is significantly improved aeration of the left lung base, previously with dense opacity, now a smaller area of airspace disease in the medial left lung base. Heart remains markedly enlarged and the vasculature is still cephalized. Mild diffuse   interstitial disease pattern may represent edema but is improving. No pneumothorax is seen.      Impression:      Impression:  1.Significantly improved aeration of the left lung base.  2.Congestive heart failure, which appears mildly improved.        Electronically Signed: German Chavira MD    12/1/2024 4:09 PM EST    Workstation ID: [id number]    XR Chest 1 View [[id number]] Collected: 12/01/24 1027     Updated: 12/01/24 1031    Narrative:      XR CHEST 1 VW    Date of Exam: 12/1/2024 8:24 AM EST    Indication: decrease o2    Comparison: 11/29/2024    Findings:  Heart shadow is markedly enlarged. There is persistent mild to moderate pulmonary vascular congestion. There is increasing, dense opacity of the lower 1/3-1/2 of the left hemithorax, and slightly increased patchy disease of the right and left midlung. No   pneumothorax is identified.      Impression:      Impression:  1.Cardiomegaly and mild to moderate pulmonary vascular congestion.  2.Increasing, dense opacity of the lower 1/3 to 1/2 of the left  hemithorax, which may represent increasing atelectasis +/- effusion.        Electronically Signed: German Chavira MD    12/1/2024 10:29 AM EST    Workstation ID: [id number]    CT Angiogram Chest Pulmonary Embolism [[id number]] Collected: 12/01/24 1014     Updated: 12/01/24 1022    Narrative:      CT ANGIOGRAM CHEST PULMONARY EMBOLISM    Date of Exam: 12/1/2024 8:30 AM EST    Indication: Dyspnea.    Comparison: Chest CT 5/19/2024    Technique: Axial CT images were obtained of the chest after the uneventful intravenous administration of iodinated contrast utilizing pulmonary embolism protocol.  Sagittal and coronal reconstructions were performed.  Automated exposure control and   iterative reconstruction methods were used.      Findings:  PULMONARY VASCULATURE: Pulmonary arteries are widely patent without evidence of embolus.  Main pulmonary artery is normal in size. No evidence of right heart strain.    MEDIASTINUM:Unremarkable. Heart is enlarged. Aortic size is normal. No aortic dissection identified. No mass nor pericardial effusion.  CORONARY ARTERIES: There is calcified atherosclerotic disease.  LUNGS: There is interstitial edema with mild central edema. There is compressive atelectasis. There are right lower lobe postoperative changes. No new nodule identified. There is emphysema.  PLEURAL SPACE: Small right and moderate left pleural effusions.  LYMPH NODES: There are no pathologically enlarged lymph nodes.    UPPER ABDOMEN: There are gallstones. There is ascites.    OSSEOUS STRUCTURES: Appropriate for age with no acute process identified.    There is anasarca.      Impression:      Impression:  1.No evidence of pulmonary embolus.  2.Congestive heart failure.  3.Small right and moderate left pleural effusions.  4.Ascites and anasarca.  5.Cholelithiasis.        Electronically Signed: Mark Kiran MD    12/1/2024 10:20 AM EST    Workstation ID: [id number]            Labs:  Results from last 7 days   Lab Units  12/02/24  0018   WBC 10*3/mm3 12.86*   HEMOGLOBIN g/dL 8.1*   HEMATOCRIT % 28.5*   PLATELETS 10*3/mm3 200     Results from last 7 days   Lab Units 12/02/24  0018   SODIUM mmol/L 137   POTASSIUM mmol/L 4.7   CHLORIDE mmol/L 101   CO2 mmol/L 27.5   BUN mg/dL 24*   CREATININE mg/dL 1.08*   CALCIUM mg/dL 8.6   GLUCOSE mg/dL 81     Results from last 7 days   Lab Units 12/01/24  1550   PH, ARTERIAL pH units 7.326*   PO2 ART mm Hg 58.5*   PCO2, ARTERIAL mm Hg 55.4*   HCO3 ART mmol/L 29.0*     Results from last 7 days   Lab Units 12/02/24  0018   ALBUMIN g/dL 2.6*             Results from last 7 days   Lab Units 12/02/24  0018   MAGNESIUM mg/dL 1.8     Results from last 7 days   Lab Units 12/02/24  0018 12/01/24  1457 12/01/24  0557 11/30/24  0705 11/30/24  0027   INR  1.76*  --  1.85*  --  1.91*   APTT seconds 90.0* 67.5* 56.5*   < > 47.5*    < > = values in this interval not displayed.     Results from last 7 days   Lab Units 11/29/24  1307   TSH uIU/mL 17.200*           Meds:   SCHEDULE  atorvastatin, 10 mg, Oral, Daily  cholestyramine light, 1 packet, Oral, Q24H  DULoxetine, 20 mg, Oral, Daily  famotidine, 20 mg, Intravenous, Q12H  [Held by provider] furosemide, 40 mg, Oral, Daily  hydrOXYzine, 25 mg, Oral, Nightly  levothyroxine, 50 mcg, Oral, Q AM  metoprolol tartrate, 50 mg, Oral, Q8H  midodrine, 10 mg, Oral, TID AC  mirtazapine, 15 mg, Oral, Nightly  nystatin, , Topical, Q12H  piperacillin-tazobactam, 3.375 g, Intravenous, Once  piperacillin-tazobactam, 3.375 g, Intravenous, Q8H  polyethylene glycol, 17 g, Oral, Daily  predniSONE, 5 mg, Oral, Daily With Breakfast  QUEtiapine, 25 mg, Oral, Nightly  sennosides-docusate, 1 tablet, Oral, BID  sildenafil, 10 mg, Oral, TID  sodium chloride, 10 mL, Intravenous, Q12H  tacrolimus, 1 mg, Oral, BID      Infusions  heparin, 18 Units/kg/hr, Last Rate: 28 Units/kg/hr (12/01/24 2146)      PRNs    senna-docusate sodium **AND** polyethylene glycol **AND** bisacodyl **AND**  bisacodyl    diazePAM    HYDROcodone-acetaminophen    influenza vaccine    LORazepam    ondansetron    prochlorperazine    [COMPLETED] Insert Peripheral IV **AND** sodium chloride    sodium chloride    sodium chloride    Physical Exam:  Physical Exam  Cardiovascular:      Heart sounds: Murmur heard.      No gallop.   Pulmonary:      Effort: No respiratory distress.      Breath sounds: No stridor. Rhonchi and rales present. No wheezing.   Chest:      Chest wall: No tenderness.         ROS  Review of Systems   Respiratory:  Positive for cough and shortness of breath. Negative for wheezing and stridor.    Cardiovascular:  Positive for chest pain, palpitations and leg swelling.             Total time spent with patient greater than: 45 Minutes

## 2024-12-02 NOTE — PROGRESS NOTES
[facility] MEDICINE SERVICE  DAILY PROGRESS NOTE    NAME: Jaja Carcamo  : 1945  MRN: [id number]      LOS: 2 days     PROVIDER OF SERVICE: Khris Tomas MD    Chief Complaint: Acute deep vein thrombosis (DVT) of other vein of right upper extremity    Subjective:     Interval History:  History taken from: patient    History of Present Illness: Jaja Carcamo is a 79 y.o. female with a CMH of atrial flutter, COPD, previous history of ESRD now with renal transplant, hypertension who presents to the hospital with complaints of right arm swelling and pain.  The patient is a poor historian but was able to tell me that she has an AV fistula on her right arm for previous history of ESRD on dialysis.  However she received a renal transplant a few years ago and has not required hemodialysis since then.  She did notice that over the past few weeks she has had increasing right upper extremity pain and swelling that is extending all the way up to her right shoulder.  She is complaining of some enlargement of veins in her right upper arm and shoulder area.  She also complains of pain in that area.  She denies any shortness of breath, cough, congestion, fevers, chills.  RUE ultrasound in the ER did show extensive DVT of the right internal jugular vein.       seen in bed LINNEA, ROBERT, ALISTAIR RN,   examined in bed acute distress, dyspnea on 8 L of oxygen, patient with significant edema left right upper extremity.  Poor access, ordered PICC team for line placement.  Discussed with RN.  24 patient seen in bed no acute distress, dyspnea on 8 L, patient with hypotension.  Will increase midodrine, received fluid bolus.  Will give albumin.  Discussed with RN.    Review of Systems  10 point ROS note except for above  Objective:     Vital Signs  Temp:  [97 °F (36.1 °C)-98.2 °F (36.8 °C)] 98.2 °F (36.8 °C)  Heart Rate:  [104-135] 105  Resp:  [18-30] 18  BP: ()/(32-62) 85/44  Flow (L/min) (Oxygen Therapy):   [6-15] 8   Body mass index is 29.11 kg/m².    Physical Exam  General Appearance:  Alert, cooperative, no distress, appears stated age  Head:   Normocephalic, without obvious abnormality, atraumatic  Eyes:   PERRL, conjunctiva/corneas clear, EOM's intact, fundi benign, both eyes  Ears:    Normal TM's and external ear canals, both ears  Nose:Nares normal, septum midline, mucosa normal, no drainage or sinus tenderness  Throat:Lips, mucosa, and tongue normal; teeth and gums normal  Neck:Supple, symmetrical, trachea midline, no adenopathy, thyroid: not enlarged, symmetric, no tenderness/mass/nodules, no carotid bruit or JVD  Lungs:             Clear to auscultation bilaterally, respirations unlabored  Heart:  Regular rate and rhythm, S1, S2 normal, no murmur, rub or gallop  Abdomen:  Soft, non-tender, bowel sounds active all four quadrants,  no masses, no organomegaly  Extremities:RUE nonpitting edema with nonfunctioning AV fistula, enlargement of right upper arm veins  Pulses:2+ and symmetric  Skin:Skin color, texture, turgor normal, no rashes or lesions  Neurologic: Normal     Diagnostic Data    Results from last 7 days   Lab Units 12/02/24  0018   WBC 10*3/mm3 12.86*   HEMOGLOBIN g/dL 8.1*   HEMATOCRIT % 28.5*   PLATELETS 10*3/mm3 200   GLUCOSE mg/dL 81   CREATININE mg/dL 1.08*   BUN mg/dL 24*   SODIUM mmol/L 137   POTASSIUM mmol/L 4.7   ANION GAP mmol/L 8.5       XR Chest 1 View    Result Date: 12/1/2024  Impression: 1.Significantly improved aeration of the left lung base. 2.Congestive heart failure, which appears mildly improved. Electronically Signed: German Chavira MD  12/1/2024 4:09 PM EST  Workstation ID: [id number]    XR Chest 1 View    Result Date: 12/1/2024  Impression: 1.Cardiomegaly and mild to moderate pulmonary vascular congestion. 2.Increasing, dense opacity of the lower 1/3 to 1/2 of the left hemithorax, which may represent increasing atelectasis +/- effusion. Electronically Signed: German Chavira MD  12/1/2024  10:29 AM EST  Workstation ID: [id number]    CT Angiogram Chest Pulmonary Embolism    Result Date: 12/1/2024  Impression: 1.No evidence of pulmonary embolus. 2.Congestive heart failure. 3.Small right and moderate left pleural effusions. 4.Ascites and anasarca. 5.Cholelithiasis. Electronically Signed: Mark Kiran MD  12/1/2024 10:20 AM EST  Workstation ID: [id number]       I reviewed the patient's new clinical results.    Assessment/Plan:     Active and Resolved Problems  Active Hospital Problems    Diagnosis  POA    **Acute deep vein thrombosis (DVT) of other vein of right upper extremity [I82.621]  Yes    Acute DVT (deep venous thrombosis) [I82.409]  Yes      Resolved Hospital Problems   No resolved problems to display.      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  Will plan for right arm fistulogram early next week.  Supportive care et al per primary team.      Atrial flutter with RVR  -Resume home diltiazem as patient has not taken her dose this morning  -Holding anticoagulation as above and initiation of heparin drip  -Use IV beta-blocker as necessary for heart rate control     COPD  -Stable  -Continue inhalers     History of renal transplant  -Continue Prograf and check Prograf level in the a.m.  -Nephrology consult     Hypothyroidism  -Continue Synthroid    VTE Prophylaxis:  Pharmacologic VTE prophylaxis orders are present.    Disposition Planning:   Barriers to Discharge:dvt  Anticipated Date of Discharge: 12/3  Place of Discharge: home      Time: 35 minutes     There are no questions and answers to display.       Signature: Electronically signed by Khris Tomas MD, 12/02/24, 11:13 EST.  [facility] Hospitalist Team

## 2024-12-02 NOTE — PROGRESS NOTES
[facility] MEDICINE SERVICE  DAILY PROGRESS NOTE    NAME: Jaja Carcamo  : 1945  MRN: [id number]      LOS: 2 days     PROVIDER OF SERVICE: Khris Tomas MD    Chief Complaint: Acute deep vein thrombosis (DVT) of other vein of right upper extremity    Subjective:     Interval History:  History taken from: patient    History of Present Illness: Jaja Carcamo is a 79 y.o. female with a CMH of atrial flutter, COPD, previous history of ESRD now with renal transplant, hypertension who presents to the hospital with complaints of right arm swelling and pain.  The patient is a poor historian but was able to tell me that she has an AV fistula on her right arm for previous history of ESRD on dialysis.  However she received a renal transplant a few years ago and has not required hemodialysis since then.  She did notice that over the past few weeks she has had increasing right upper extremity pain and swelling that is extending all the way up to her right shoulder.  She is complaining of some enlargement of veins in her right upper arm and shoulder area.  She also complains of pain in that area.  She denies any shortness of breath, cough, congestion, fevers, chills.  RUE ultrasound in the ER did show extensive DVT of the right internal jugular vein.       seen in bed ROBERT YARBROUGH, ALISTAIR RN,   examined in bed acute distress, dyspnea on 8 L of oxygen, patient with significant edema left right upper extremity.  Poor access, ordered PICC team for line placement.  Discussed with RN.    Review of Systems  10 point ROS note except for above  Objective:     Vital Signs  Temp:  [97 °F (36.1 °C)-98.2 °F (36.8 °C)] 98.2 °F (36.8 °C)  Heart Rate:  [104-135] 105  Resp:  [18-30] 18  BP: ()/(32-62) 85/44  Flow (L/min) (Oxygen Therapy):  [6-15] 8   Body mass index is 29.11 kg/m².    Physical Exam  General Appearance:  Alert, cooperative, no distress, appears stated age  Head:   Normocephalic, without obvious  abnormality, atraumatic  Eyes:   PERRL, conjunctiva/corneas clear, EOM's intact, fundi benign, both eyes  Ears:    Normal TM's and external ear canals, both ears  Nose:Nares normal, septum midline, mucosa normal, no drainage or sinus tenderness  Throat:Lips, mucosa, and tongue normal; teeth and gums normal  Neck:Supple, symmetrical, trachea midline, no adenopathy, thyroid: not enlarged, symmetric, no tenderness/mass/nodules, no carotid bruit or JVD  Lungs:             Clear to auscultation bilaterally, respirations unlabored  Heart:  Regular rate and rhythm, S1, S2 normal, no murmur, rub or gallop  Abdomen:  Soft, non-tender, bowel sounds active all four quadrants,  no masses, no organomegaly  Extremities:RUE nonpitting edema with nonfunctioning AV fistula, enlargement of right upper arm veins  Pulses:2+ and symmetric  Skin:Skin color, texture, turgor normal, no rashes or lesions  Neurologic: Normal     Diagnostic Data    Results from last 7 days   Lab Units 12/02/24  0018   WBC 10*3/mm3 12.86*   HEMOGLOBIN g/dL 8.1*   HEMATOCRIT % 28.5*   PLATELETS 10*3/mm3 200   GLUCOSE mg/dL 81   CREATININE mg/dL 1.08*   BUN mg/dL 24*   SODIUM mmol/L 137   POTASSIUM mmol/L 4.7   ANION GAP mmol/L 8.5       XR Chest 1 View    Result Date: 12/1/2024  Impression: 1.Significantly improved aeration of the left lung base. 2.Congestive heart failure, which appears mildly improved. Electronically Signed: German Chavira MD  12/1/2024 4:09 PM EST  Workstation ID: [id number]    XR Chest 1 View    Result Date: 12/1/2024  Impression: 1.Cardiomegaly and mild to moderate pulmonary vascular congestion. 2.Increasing, dense opacity of the lower 1/3 to 1/2 of the left hemithorax, which may represent increasing atelectasis +/- effusion. Electronically Signed: German Chavira MD  12/1/2024 10:29 AM EST  Workstation ID: [id number]    CT Angiogram Chest Pulmonary Embolism    Result Date: 12/1/2024  Impression: 1.No evidence of pulmonary embolus. 2.Congestive heart  failure. 3.Small right and moderate left pleural effusions. 4.Ascites and anasarca. 5.Cholelithiasis. Electronically Signed: Mark Kiran MD  12/1/2024 10:20 AM EST  Workstation ID: [id number]       I reviewed the patient's new clinical results.    Assessment/Plan:     Active and Resolved Problems  Active Hospital Problems    Diagnosis  POA    **Acute deep vein thrombosis (DVT) of other vein of right upper extremity [I82.621]  Yes    Acute DVT (deep venous thrombosis) [I82.409]  Yes      Resolved Hospital Problems   No resolved problems to display.      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  Will plan for right arm fistulogram early next week.  Supportive care et al per primary team.      Atrial flutter with RVR  -Resume home diltiazem as patient has not taken her dose this morning  -Holding anticoagulation as above and initiation of heparin drip  -Use IV beta-blocker as necessary for heart rate control     COPD  -Stable  -Continue inhalers     History of renal transplant  -Continue Prograf and check Prograf level in the a.m.  -Nephrology consult     Hypothyroidism  -Continue Synthroid    VTE Prophylaxis:  Pharmacologic VTE prophylaxis orders are present.    Disposition Planning:   Barriers to Discharge:dvt  Anticipated Date of Discharge: 12/3  Place of Discharge: home      Time: 35 minutes     There are no questions and answers to display.       Signature: Electronically signed by Khris Tomas MD, 12/02/24, 11:12 EST.  [facility], [place], operated by [facility] Hospitalist Team

## 2024-12-02 NOTE — PROGRESS NOTES
"RENAL/KCC:     LOS: 2 days    Patient Care Team:  Kvng Guillen MD as PCP - General (Family Medicine)  Jak Gavin MD as Cardiologist (Cardiology)    Chief Complaint:  TONYA/CKD, Kidney transplant    Subjective     Interval History:   Chart reviewed  Somnolent but awakes easily  No new complaints    Objective     Vital Sign Min/Max for last 24 hours  Temp  Min: 97 °F (36.1 °C)  Max: 98.2 °F (36.8 °C)   BP  Min: 63/32  Max: 105/58   Pulse  Min: 95  Max: 135   Resp  Min: 16  Max: 30   SpO2  Min: 87 %  Max: 100 %   Flow (L/min) (Oxygen Therapy)  Min: 6  Max: 15   No data recorded     Flowsheet Rows      Flowsheet Row First Filed Value   Admission Height 167.6 cm (66\") Documented at 11/29/2024 1238   Admission Weight 74.5 kg (164 lb 3.9 oz) Documented at 11/29/2024 1238            I/O this shift:  In: 440 [P.O.:240; IV Piggyback:200]  Out: -   I/O last 3 completed shifts:  In: 780 [P.O.:780]  Out: 400 [Urine:400]    Physical Exam:  GEN: Awake, NAD  ENT: PERRL, EOMI, MMM  NECK: Supple, no JVD  CHEST: CTAB, no W/R/C  CV: RRR, no M/G/R  ABD: Soft, NT, +BS  SKIN: Warm and Dry  NEURO: CN's intact      WBC WBC   Date Value Ref Range Status   12/02/2024 12.86 (H) 3.40 - 10.80 10*3/mm3 Final   12/01/2024 8.63 3.40 - 10.80 10*3/mm3 Final   11/30/2024 12.15 (H) 3.40 - 10.80 10*3/mm3 Final   11/29/2024 17.75 (H) 3.40 - 10.80 10*3/mm3 Final      HGB Hemoglobin   Date Value Ref Range Status   12/02/2024 8.1 (L) 12.0 - 15.9 g/dL Final   12/01/2024 8.0 (L) 12.0 - 15.9 g/dL Final   11/30/2024 7.7 (L) 12.0 - 15.9 g/dL Final   11/29/2024 8.9 (L) 12.0 - 15.9 g/dL Final      HCT Hematocrit   Date Value Ref Range Status   12/02/2024 28.5 (L) 34.0 - 46.6 % Final   12/01/2024 28.4 (L) 34.0 - 46.6 % Final   11/30/2024 28.1 (L) 34.0 - 46.6 % Final   11/29/2024 31.6 (L) 34.0 - 46.6 % Final      Platlets No results found for: \"LABPLAT\"   MCV MCV   Date Value Ref Range Status   12/02/2024 92.2 79.0 - 97.0 fL Final   12/01/2024 94.0 79.0 - 97.0 " "fL Final   11/30/2024 93.0 79.0 - 97.0 fL Final   11/29/2024 91.9 79.0 - 97.0 fL Final          Sodium Sodium   Date Value Ref Range Status   12/02/2024 137 136 - 145 mmol/L Final   12/01/2024 136 136 - 145 mmol/L Final   11/30/2024 136 136 - 145 mmol/L Final   11/29/2024 137 136 - 145 mmol/L Final      Potassium Potassium   Date Value Ref Range Status   12/02/2024 4.7 3.5 - 5.2 mmol/L Final     Comment:     Specimen hemolyzed.  Result may be falsely elevated.   12/01/2024 4.8 3.5 - 5.2 mmol/L Final   11/30/2024 4.2 3.5 - 5.2 mmol/L Final   11/29/2024 4.9 3.5 - 5.2 mmol/L Final      Chloride Chloride   Date Value Ref Range Status   12/02/2024 101 98 - 107 mmol/L Final   12/01/2024 100 98 - 107 mmol/L Final   11/30/2024 101 98 - 107 mmol/L Final   11/29/2024 100 98 - 107 mmol/L Final      CO2 CO2   Date Value Ref Range Status   12/02/2024 27.5 22.0 - 29.0 mmol/L Final   12/01/2024 26.4 22.0 - 29.0 mmol/L Final   11/30/2024 27.8 22.0 - 29.0 mmol/L Final   11/29/2024 29.1 (H) 22.0 - 29.0 mmol/L Final      BUN BUN   Date Value Ref Range Status   12/02/2024 24 (H) 8 - 23 mg/dL Final   12/01/2024 21 8 - 23 mg/dL Final   11/30/2024 16 8 - 23 mg/dL Final   11/29/2024 13 8 - 23 mg/dL Final      Creatinine Creatinine   Date Value Ref Range Status   12/02/2024 1.08 (H) 0.57 - 1.00 mg/dL Final   12/01/2024 0.87 0.57 - 1.00 mg/dL Final   11/30/2024 0.73 0.57 - 1.00 mg/dL Final   11/29/2024 0.71 0.57 - 1.00 mg/dL Final      Calcium Calcium   Date Value Ref Range Status   12/02/2024 8.6 8.6 - 10.5 mg/dL Final   12/01/2024 8.9 8.6 - 10.5 mg/dL Final   11/30/2024 9.0 8.6 - 10.5 mg/dL Final   11/29/2024 9.4 8.6 - 10.5 mg/dL Final      PO4 No results found for: \"CAPO4\"   Albumin Albumin   Date Value Ref Range Status   12/02/2024 2.6 (L) 3.5 - 5.2 g/dL Final      Magnesium Magnesium   Date Value Ref Range Status   12/02/2024 1.8 1.6 - 2.4 mg/dL Final   12/01/2024 1.9 1.6 - 2.4 mg/dL Final   11/30/2024 1.9 1.6 - 2.4 mg/dL Final " "  11/29/2024 1.9 1.6 - 2.4 mg/dL Final      Uric Acid No results found for: \"URICACID\"        Results Review:     I reviewed the patient's new clinical results.    atorvastatin, 10 mg, Oral, Daily  cholestyramine light, 1 packet, Oral, Q24H  DULoxetine, 20 mg, Oral, Daily  famotidine, 20 mg, Intravenous, Q12H  [Held by provider] furosemide, 40 mg, Oral, Daily  hydrOXYzine, 25 mg, Oral, Nightly  levothyroxine, 50 mcg, Oral, Q AM  metoprolol tartrate, 50 mg, Oral, Q8H  midodrine, 10 mg, Oral, TID AC  mirtazapine, 15 mg, Oral, Nightly  nystatin, , Topical, Q12H  piperacillin-tazobactam, 4.5 g, Intravenous, Once  piperacillin-tazobactam, 4.5 g, Intravenous, Q8H  polyethylene glycol, 17 g, Oral, Daily  predniSONE, 5 mg, Oral, Daily With Breakfast  QUEtiapine, 25 mg, Oral, Nightly  sennosides-docusate, 1 tablet, Oral, BID  sildenafil, 10 mg, Oral, TID  sodium chloride, 1,000 mL, Intravenous, Once  sodium chloride, 10 mL, Intravenous, Q12H  tacrolimus, 1 mg, Oral, BID      heparin, 18 Units/kg/hr, Last Rate: 28 Units/kg/hr (12/01/24 2146)        Medication Review: Reviewed    Assessment & Plan     1) Kidney Transplant  2) TONYA - Cr bumped to 1.08  3) DVT  4) Hypotension  5) Anemia    Plan: Bump in Cr likely related to hypotension, reduced renal perfusion.  Lytes OK.  Lasix on hold.  Midodrine for BP support.  On AC and Vascular following.  Pulm following as well.        Naun Falcon MD  [facility]  12/02/24  05:42 EST      "

## 2024-12-02 NOTE — PLAN OF CARE
Goal Outcome Evaluation:                    Patient has had a very terrible time with her 02 sats staying above 88%. She is now stable and sats are currently at 94% on 8L high flow. Patient has lost 2 IV sites and is a very hard stick and it caused the heparin blood draw along with other blood draws to be overdue. Patient is currently going to be started on IV ABX at 2100 but needs blood cultures x 2 before the ABX can start. Report was given to the oncoming nurse in the regards to the labs that need to be drawn. Patient has had 3 episodes of diarrhea and vomiting. I could not order a stool culture for cdiff because patient was given a stool softener today. Patient is resting and comfortable at this time.

## 2024-12-02 NOTE — CONSULTS
Nutrition Services    Patient Name: Jaja Carcamo  YOB: 1945  MRN: [id number]  Admission date: 11/29/2024    Comment:    --Monitor/Encourage PO intake     CLINICAL NUTRITION ASSESSMENT      Reason for Assessment 12/02: MST=3   H&P      Past Medical History:   Diagnosis Date    Allergic rhinitis 04/14/2015    Asthma 08/10/2017    Atrial flutter 05/11/2017    Chronic diarrhea 04/15/2019    Chronic hypoxemic respiratory failure 01/18/2024    Chronic pain 02/03/2015    COPD (chronic obstructive pulmonary disease)     COVID-19 virus detected 08/11/2022    Cytokine release syndrome, grade 1 08/16/2022    Edema of both lower extremities due to peripheral venous insufficiency 03/12/2021    ESRD on hemodialysis 05/22/2013    Essential (primary) hypertension 03/03/2022    Fracture of ulnar styloid 05/19/2022    Fracture of unspecified carpal bone, right wrist, subsequent encounter for fracture with routine healing 03/03/2022    Gastroesophageal reflux disease 11/12/2020    Gout 08/10/2017    Hearing loss 08/10/2017    History of appendectomy     History of DVT (deep vein thrombosis) 11/12/2020    History of kidney transplant 06/30/2017    History of lobectomy of lung 01/18/2024 03/08/2016:  RIGHT Lower Lobe Mass--> Right Video-assisted thoracoscopy with a moderate-to-large wedge resection of the RLL (by Dr. Haris Mcconnell @ [facility])--> Poorly differentiated carcinoma of the RLL.      History of repair of hip joint 05/21/2013    History of suicide attempt 05/20/2024    Hyperlipidemia 08/11/2014    Hypothyroidism, unspecified 03/03/2022    Infection due to extended spectrum beta lactamase (ESBL) producing bacteria 03/11/2016    No A2K system hx. +ESBL E coli urine on 3/11/16.      Irritable bowel syndrome 04/15/2019    Long term (current) use of immunosuppressive biologic 04/28/2021    Long term current use of anticoagulant therapy 01/18/2024    Malignant neoplasm of lung 08/10/2017    Menopausal  flushing 08/30/2021    Mitral valve regurgitation 07/06/2015    Mixed anxiety and depressive disorder 04/30/2015    Non-small cell lung cancer 08/10/2017    Nonobstructive atherosclerosis of coronary artery 06/02/2019 08/12/2022: CATH: [facility]: NSTEMI assoc with Covid-19: LM:-nl;  LAD: diffuse, Ca++; 30%; CIRC: Dominant. Normal; RCA: small; 50% diffuse, proximal and mid-segment.      NSTEMI (non-ST elevated myocardial infarction) 08/11/2022    Obsessive-compulsive disorder 04/15/2019    Osteoarthritis 05/20/2024    Paroxysmal atrial fibrillation 05/11/2017    Paroxysmal supraventricular tachycardia 05/11/2017    Peripheral neuropathy 08/11/2014    Personal history of peptic ulcer disease 11/12/2020    Postoperative anemia due to acute blood loss 05/22/2013    Right wrist fracture 03/01/2022    Seasonal allergies 08/10/2017    Secondary hyperparathyroidism of renal origin 09/14/2015    Tricuspid valve regurgitation 03/15/2017    Ulcer of lower extremity 08/30/2021    Unspecified acute appendicitis 03/03/2022    Valvular heart disease 05/20/2024    Wegener's granulomatosis with renal involvement 03/03/2022       Past Surgical History:   Procedure Laterality Date    APPENDECTOMY      BREAST SURGERY Left     cysts rmeoved    CARDIAC CATHETERIZATION Right 08/12/2022    Procedure: Left Heart Cath and coronary angiogram;  Surgeon: Jak Gavin MD;  Location: [facility] CATH INVASIVE LOCATION;  Service: Cardiology;  Laterality: Right;    CLOSED REDUCTION WRIST FRACTURE Right 03/01/2022    Procedure: WRIST CLOSED REDUCTION;  Surgeon: Gaudencio Townsend MD;  Location: [facility] MAIN OR;  Service: Orthopedics;  Laterality: Right;    CYSTOSCOPY      HIP ARTHROPLASTY      HYSTERECTOMY      LUNG LOBECTOMY Right     TRANSPLANTATION RENAL          Current Problems   Acute RUE DVT  -Vascular surgery is following, plan for right fistulagram on Tuesday   Atrial flutter with RVR  COPD - Pulmonology is following   History of renal  "transplant - Nephrology is following   Hypothyroidism        Encounter Information        Trending Narrative     12/02: Pt was admitted with right arm pain and swelling. Patient has a right arm fistula that has not been used in 8 years. Planning for fistulagram.     Pt was sitting up and eating breakfast at my visit. Pt reports that her appetite has been poor over several days. Per chart review, pt's weight has remained stable. Pt declines nutrition supplements. Pt did have some moderate muscle/fat wasting but there is not enough evidence to support a malnutrition diagnosis at this time. Offered to obtain food preferences but pt declined.      Anthropometrics        Current Height, Weight Height: 167.6 cm (66\")  Weight: 81.8 kg (180 lb 5.4 oz) (12/01/24 0330)       Usual Body Weight (UBW) Unknown        Trending Weight Hx     This admission: 12/02: 180# (obtained 12/01)              PTA: 12/02: 1% increase over 7 months    Wt Readings from Last 30 Encounters:   12/01/24 0330 81.8 kg (180 lb 5.4 oz)   11/29/24 1715 81 kg (178 lb 9.2 oz)   11/29/24 1238 74.5 kg (164 lb 3.9 oz)   06/13/24 0452 74.4 kg (164 lb 0.4 oz)   06/11/24 0600 67.5 kg (148 lb 13 oz)   06/08/24 0400 62.5 kg (137 lb 12.6 oz)   06/06/24 0447 64.4 kg (141 lb 15.6 oz)   06/05/24 1152 71.3 kg (157 lb 3 oz)   06/03/24 0501 71.3 kg (157 lb 3 oz)   06/01/24 0644 72.2 kg (159 lb 2.8 oz)   05/29/24 0445 84.7 kg (186 lb 11.7 oz)   05/28/24 0444 84.2 kg (185 lb 10 oz)   05/27/24 0526 84.9 kg (187 lb 2.7 oz)   05/26/24 0406 84.6 kg (186 lb 8.2 oz)   05/25/24 1100 84.6 kg (186 lb 8.2 oz)   05/25/24 0438 85.1 kg (187 lb 9.8 oz)   05/24/24 1143 86.2 kg (190 lb)   05/22/24 0314 89.3 kg (196 lb 13.9 oz)   05/21/24 0430 88.6 kg (195 lb 5.2 oz)   05/20/24 1503 92.1 kg (203 lb)   05/20/24 0413 92.3 kg (203 lb 7.8 oz)   05/19/24 0500 89.9 kg (198 lb 3.1 oz)   05/18/24 1100 88.9 kg (195 lb 15.8 oz)   05/18/24 0308 92.9 kg (204 lb 12.9 oz)   05/17/24 2150 80.7 kg (178 " "lb)   08/16/22 0535 84.7 kg (186 lb 11.7 oz)   08/14/22 0433 81.3 kg (179 lb 3.7 oz)   08/12/22 0500 81.4 kg (179 lb 7.3 oz)   08/11/22 2154 81.4 kg (179 lb 7.3 oz)   08/11/22 1301 81.6 kg (180 lb)   03/01/22 0804 83 kg (182 lb 15.7 oz)   03/01/22 0527 83 kg (182 lb 15.7 oz)   02/28/22 2241 81 kg (178 lb 9.2 oz)   10/14/20 1055 (P) 85.3 kg (188 lb)      BMI kg/m2 Body mass index is 29.11 kg/m².       Labs        Pertinent Labs Reviewed. Management per MD    Results from last 7 days   Lab Units 12/02/24  0018 12/01/24  0557 11/30/24  0027   SODIUM mmol/L 137 136 136   POTASSIUM mmol/L 4.7 4.8 4.2   CHLORIDE mmol/L 101 100 101   CO2 mmol/L 27.5 26.4 27.8   BUN mg/dL 24* 21 16   CREATININE mg/dL 1.08* 0.87 0.73   CALCIUM mg/dL 8.6 8.9 9.0   GLUCOSE mg/dL 81 162* 120*     Results from last 7 days   Lab Units 12/02/24  0018 12/01/24  0557 11/30/24  0027   MAGNESIUM mg/dL 1.8 1.9 1.9   PHOSPHORUS mg/dL 4.1  --   --    HEMOGLOBIN g/dL 8.1* 8.0* 7.7*   HEMATOCRIT % 28.5* 28.4* 28.1*     No results found for: \"HGBA1C\"     Medications    Scheduled Medications atorvastatin, 10 mg, Oral, Daily  cholestyramine light, 1 packet, Oral, Q24H  DULoxetine, 20 mg, Oral, Daily  famotidine, 20 mg, Intravenous, Q12H  [Held by provider] furosemide, 40 mg, Oral, Daily  hydrOXYzine, 25 mg, Oral, Nightly  levothyroxine, 50 mcg, Oral, Q AM  metoprolol tartrate, 50 mg, Oral, Q8H  midodrine, 10 mg, Oral, TID AC  mirtazapine, 15 mg, Oral, Nightly  nystatin, , Topical, Q12H  piperacillin-tazobactam, 3.375 g, Intravenous, Once  piperacillin-tazobactam, 3.375 g, Intravenous, Q8H  polyethylene glycol, 17 g, Oral, Daily  predniSONE, 5 mg, Oral, Daily With Breakfast  QUEtiapine, 25 mg, Oral, Nightly  sennosides-docusate, 1 tablet, Oral, BID  sildenafil, 10 mg, Oral, TID  sodium chloride, 10 mL, Intravenous, Q12H  tacrolimus, 1 mg, Oral, BID        Infusions heparin, 18 Units/kg/hr, Last Rate: 28 Units/kg/hr (12/01/24 5158)        PRN Medications   " senna-docusate sodium **AND** polyethylene glycol **AND** bisacodyl **AND** bisacodyl    diazePAM    HYDROcodone-acetaminophen    influenza vaccine    LORazepam    ondansetron    prochlorperazine    [COMPLETED] Insert Peripheral IV **AND** sodium chloride    sodium chloride    sodium chloride     Physical Findings        Trending Physical   Appearance, NFPE 12/02: NFPE completed and not consistent with nutrition diagnosis of malnutrition at this time using AND/ASPEN criteria     --  Edema  4+ arm  3+ L/R leg    Bowel Function   +BM 12/02   Tubes   No tubes    Chewing/Swallowing   No issues reported   Skin   Wounds noted on avatar but no WOCN note    --  Current Nutrition Orders & Evaluation of Intake       Oral Nutrition     Food Allergies NKFA    Current PO Diet Diet: Regular/House; Fluid Consistency: Thin (IDDSI 0)  NPO Diet NPO Type: Strict NPO   Supplement None ordered    PO Evaluation     Trending % PO Intake 12/02: 0% documented - pt had consumed ~25% at my visit    --  Nutritional Risk Screening        NRS-2002 Score          Nutrition Diagnosis         Nutrition Dx Problem 1 Inadequate oral intake related to decreased appetite as evidenced by consuming less than 50% of meals       Nutrition Dx Problem 2        Intervention Goal         Intervention Goal(s) To consume at least 50% of meals      Nutrition Intervention        RD Action NFPE complete, encourage PO intake      Nutrition Prescription          Diet Prescription Regular diet with thin liquids    Supplement Prescription None ordered    --  Monitor/Evaluation        Monitor PO intake         Electronically signed by:  Lindy Deleon RD  12/02/24 08:18 EST

## 2024-12-02 NOTE — PROGRESS NOTES
Name: Jaja Carcamo ADMIT: 2024   : 1945  PCP: Kvng Guillen MD    MRN: [id number] LOS: 2 days   AGE/SEX: 79 y.o. female  ROOM:  [facility] 272/A     CC: Right arm swelling, IJ DVT  Interval History: Hypotensive this morning, currently receiving albumin    Subjective   Subjective     Review of Systems  Objective   Objective     Vitals:   Temp:  [97 °F (36.1 °C)-98.2 °F (36.8 °C)] 98.2 °F (36.8 °C)  Heart Rate:  [] 125  Resp:  [16-30] 26  BP: ()/(32-64) 95/53    No intake/output data recorded.    Scheduled Meds:     atorvastatin, 10 mg, Oral, Daily  cholestyramine light, 1 packet, Oral, Q24H  DULoxetine, 20 mg, Oral, Daily  famotidine, 20 mg, Intravenous, Q12H  [Held by provider] furosemide, 40 mg, Oral, Daily  hydrOXYzine, 25 mg, Oral, Nightly  levothyroxine, 50 mcg, Oral, Q AM  metoprolol tartrate, 50 mg, Oral, Q8H  midodrine, 10 mg, Oral, TID AC  mirtazapine, 15 mg, Oral, Nightly  nystatin, , Topical, Q12H  piperacillin-tazobactam, 3.375 g, Intravenous, Once  piperacillin-tazobactam, 3.375 g, Intravenous, Q8H  polyethylene glycol, 17 g, Oral, Daily  predniSONE, 5 mg, Oral, Daily With Breakfast  QUEtiapine, 25 mg, Oral, Nightly  sennosides-docusate, 1 tablet, Oral, BID  sildenafil, 10 mg, Oral, TID  sodium chloride, 10 mL, Intravenous, Q12H  tacrolimus, 1 mg, Oral, BID      IV Meds:   heparin, 18 Units/kg/hr, Last Rate: 28 Units/kg/hr (24)        Physical Exam  Right arm edematous  Pulsatile thrill in right arm fistula.     Data Reviewed:  CBC    Results from last 7 days   Lab Units 24  0018 24  0557 24  0027 24  1307   WBC 10*3/mm3 12.86* 8.63 12.15* 17.75*   HEMOGLOBIN g/dL 8.1* 8.0* 7.7* 8.9*   PLATELETS 10*3/mm3 200 185 158 169     BMP   Results from last 7 days   Lab Units 24  0018 24  0557 24  0027 24  1307   SODIUM mmol/L 137 136 136 137   POTASSIUM mmol/L 4.7 4.8 4.2 4.9   CHLORIDE mmol/L 101 100 101 100   CO2 mmol/L 27.5  "26.4 27.8 29.1*   BUN mg/dL 24* 21 16 13   CREATININE mg/dL 1.08* 0.87 0.73 0.71   GLUCOSE mg/dL 81 162* 120* 102*   MAGNESIUM mg/dL 1.8 1.9 1.9 1.9   PHOSPHORUS mg/dL 4.1  --   --   --      Cr Clearance Estimated Creatinine Clearance: 47 mL/min (A) (by C-G formula based on SCr of 1.08 mg/dL (H)).  Coag   Results from last 7 days   Lab Units 12/02/24  0018 12/01/24  1457 12/01/24  0557 11/30/24  1547 11/30/24  0705 11/30/24  0027 11/29/24  1710 11/29/24  1307   INR  1.76*  --  1.85*  --   --  1.91*  --  2.22*   APTT seconds 90.0* 67.5* 56.5* 86.2* 81.1* 47.5* 36.0* 36.2*     HbA1C No results found for: \"HGBA1C\"  Blood Glucose No results found for: \"POCGLU\"  Infection     CMP   Results from last 7 days   Lab Units 12/02/24  0018 12/01/24  0557 11/30/24  0027 11/29/24  1307   SODIUM mmol/L 137 136 136 137   POTASSIUM mmol/L 4.7 4.8 4.2 4.9   CHLORIDE mmol/L 101 100 101 100   CO2 mmol/L 27.5 26.4 27.8 29.1*   BUN mg/dL 24* 21 16 13   CREATININE mg/dL 1.08* 0.87 0.73 0.71   GLUCOSE mg/dL 81 162* 120* 102*   ALBUMIN g/dL 2.6*  --   --   --      ABG    Results from last 7 days   Lab Units 12/01/24  1550   PH, ARTERIAL pH units 7.326*   PCO2, ARTERIAL mm Hg 55.4*   PO2 ART mm Hg 58.5*   O2 SATURATION ART % 87.2*   BASE EXCESS ART mmol/L 2.0     UA      JV  No results found for: \"POCMETH\", \"POCAMPHET\", \"POCBARBITUR\", \"POCBENZO\", \"POCCOCAINE\", \"POCOPIATES\", \"POCOXYCODO\", \"POCPHENCYC\", \"POCPROPOXY\", \"POCTHC\", \"POCTRICYC\"  Lysis Labs   Results from last 7 days   Lab Units 12/02/24  0018 12/01/24  1457 12/01/24  0557 11/30/24  1547 11/30/24  0705 11/30/24  0027 11/29/24  1710 11/29/24  1307   INR  1.76*  --  1.85*  --   --  1.91*  --  2.22*   APTT seconds 90.0* 67.5* 56.5* 86.2* 81.1* 47.5* 36.0* 36.2*   HEMOGLOBIN g/dL 8.1*  --  8.0*  --   --  7.7*  --  8.9*   PLATELETS 10*3/mm3 200  --  185  --   --  158  --  169   CREATININE mg/dL 1.08*  --  0.87  --   --  0.73  --  0.71     Radiology(recent) XR Chest 1 View    Result Date: " 12/1/2024  Impression: 1.Significantly improved aeration of the left lung base. 2.Congestive heart failure, which appears mildly improved. Electronically Signed: German Chavira MD  12/1/2024 4:09 PM EST  Workstation ID: [id number]    XR Chest 1 View    Result Date: 12/1/2024  Impression: 1.Cardiomegaly and mild to moderate pulmonary vascular congestion. 2.Increasing, dense opacity of the lower 1/3 to 1/2 of the left hemithorax, which may represent increasing atelectasis +/- effusion. Electronically Signed: German Chavira MD  12/1/2024 10:29 AM EST  Workstation ID: [id number]    CT Angiogram Chest Pulmonary Embolism    Result Date: 12/1/2024  Impression: 1.No evidence of pulmonary embolus. 2.Congestive heart failure. 3.Small right and moderate left pleural effusions. 4.Ascites and anasarca. 5.Cholelithiasis. Electronically Signed: Mark Kiran MD  12/1/2024 10:20 AM EST  Workstation ID: [id number]     Most notable findings include: Hgb 8.0, INR 1.8, no leukocytosis.     Active Hospital Problems:   Active Hospital Problems    Diagnosis  POA    **Acute deep vein thrombosis (DVT) of other vein of right upper extremity [I82.621]  Yes    Acute DVT (deep venous thrombosis) [I82.409]  Yes      Resolved Hospital Problems   No resolved problems to display.      Assessment & Plan     Assessment / Plan     Jaja Carcamo is a 79 y.o. female with right arm swelling and right internal jugular vein DVT.  Patient has a right arm fistula on that side that has not been used in 8 years.  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  I think this could likely be successfully managed with a fistulogram with possible intervention.    Will plan for right arm fistulagram tomorrow morning.   On heparin gtt for her atrial flutter.   Dr. Clay recommendations for perioperative hydration noted. Will order normal saline  to start tomorrow at midnight (0001 on 12/3/24).    Lisa Wolf, APRN  12/02/24  [phone]

## 2024-12-02 NOTE — CASE MANAGEMENT/SOCIAL WORK
Continued Stay Note  DAHIANA Gay     Patient Name: Jaja Carcamo  MRN: [id number]  Today's Date: 12/2/2024    Admit Date: 11/29/2024    Plan: DC PLAN: Return to [facility]. May need transport.       Discharge Plan       Row Name 12/02/24 1544       Plan    Plan DC PLAN: Return to [facility]. May need transport.        Patient/Family in Agreement with Plan yes    Plan Comments DC BARRIERS: Loose stools, pending C.Diff, Elevated Heart rate, Hypotension, possible Levophed gtt. Pending Clinical course.                        Expected Discharge Date and Time       Expected Discharge Date Expected Discharge Time    Dec 3, 2024           Sofei Roman RN   Case Management  [phone]

## 2024-12-02 NOTE — NURSING NOTE
Attending notified through of BP and HR; concerns of  patient having frequent BMs; and concern of sepsis. Awaiting response.

## 2024-12-02 NOTE — SIGNIFICANT NOTE
12/02/24 1050   OTHER   Discipline physical therapist   Rehab Time/Intention   Session Not Performed unable to evaluate, medical status change  (Low BP at rest. Pt may need transfer to ICU for vasopressors. Not appropriate for PT at this time.)   Therapy Assessment/Plan (PT)   Criteria for Skilled Interventions Met (PT) yes;skilled treatment is necessary   Recommendation   PT - Next Appointment 12/03/24

## 2024-12-03 ENCOUNTER — ANCILLARY PROCEDURE (OUTPATIENT)
Dept: PERIOP | Facility: HOSPITAL | Age: 79
End: 2024-12-03
Payer: MEDICARE

## 2024-12-03 ENCOUNTER — ANESTHESIA EVENT (OUTPATIENT)
Dept: PERIOP | Facility: HOSPITAL | Age: 79
End: 2024-12-03
Payer: MEDICARE

## 2024-12-03 ENCOUNTER — APPOINTMENT (OUTPATIENT)
Dept: CARDIOLOGY | Facility: HOSPITAL | Age: 79
End: 2024-12-03
Payer: MEDICARE

## 2024-12-03 ENCOUNTER — APPOINTMENT (OUTPATIENT)
Dept: ULTRASOUND IMAGING | Facility: HOSPITAL | Age: 79
End: 2024-12-03
Payer: MEDICARE

## 2024-12-03 ENCOUNTER — ANESTHESIA (OUTPATIENT)
Dept: PERIOP | Facility: HOSPITAL | Age: 79
End: 2024-12-03
Payer: MEDICARE

## 2024-12-03 LAB
ALBUMIN SERPL-MCNC: 3.4 G/DL (ref 3.5–5.2)
ANION GAP SERPL CALCULATED.3IONS-SCNC: 12.5 MMOL/L (ref 5–15)
AORTIC DIMENSIONLESS INDEX: 0.59 (DI)
APTT PPP: 34.7 SECONDS (ref 22.7–35.4)
APTT PPP: 37.5 SECONDS (ref 22.7–35.4)
APTT PPP: 65.9 SECONDS (ref 22.7–35.4)
AV MEAN PRESS GRAD SYS DOP V1V2: 6 MMHG
AV VMAX SYS DOP: 167 CM/SEC
BH CV ECHO MEAS - ACS: 1.8 CM
BH CV ECHO MEAS - AO MAX PG: 11.2 MMHG
BH CV ECHO MEAS - AO V2 VTI: 25.1 CM
BH CV ECHO MEAS - AVA(I,D): 2.3 CM2
BH CV ECHO MEAS - EDV(CUBED): 68.9 ML
BH CV ECHO MEAS - EDV(MOD-SP4): 49.8 ML
BH CV ECHO MEAS - EF(MOD-SP4): 69.5 %
BH CV ECHO MEAS - ESV(CUBED): 22 ML
BH CV ECHO MEAS - ESV(MOD-SP4): 15.2 ML
BH CV ECHO MEAS - FS: 31.7 %
BH CV ECHO MEAS - IVS/LVPW: 0.93 CM
BH CV ECHO MEAS - IVSD: 1.3 CM
BH CV ECHO MEAS - LA DIMENSION: 5.3 CM
BH CV ECHO MEAS - LAT PEAK E' VEL: 13.2 CM/SEC
BH CV ECHO MEAS - LV DIASTOLIC VOL/BSA (35-75): 26 CM2
BH CV ECHO MEAS - LV MASS(C)D: 204.9 GRAMS
BH CV ECHO MEAS - LV MAX PG: 3.9 MMHG
BH CV ECHO MEAS - LV MEAN PG: 2 MMHG
BH CV ECHO MEAS - LV SYSTOLIC VOL/BSA (12-30): 7.9 CM2
BH CV ECHO MEAS - LV V1 MAX: 98.6 CM/SEC
BH CV ECHO MEAS - LV V1 VTI: 18.4 CM
BH CV ECHO MEAS - LVIDD: 4.1 CM
BH CV ECHO MEAS - LVIDS: 2.8 CM
BH CV ECHO MEAS - LVOT AREA: 3.1 CM2
BH CV ECHO MEAS - LVOT DIAM: 2 CM
BH CV ECHO MEAS - LVPWD: 1.4 CM
BH CV ECHO MEAS - MED PEAK E' VEL: 12.9 CM/SEC
BH CV ECHO MEAS - MR MAX PG: 77.4 MMHG
BH CV ECHO MEAS - MR MAX VEL: 440 CM/SEC
BH CV ECHO MEAS - MR MEAN PG: 56 MMHG
BH CV ECHO MEAS - MR MEAN VEL: 365 CM/SEC
BH CV ECHO MEAS - MR VTI: 119 CM
BH CV ECHO MEAS - MV A MAX VEL: 44 CM/SEC
BH CV ECHO MEAS - MV DEC SLOPE: 685 CM/SEC2
BH CV ECHO MEAS - MV DEC TIME: 0.18 SEC
BH CV ECHO MEAS - MV E MAX VEL: 137.5 CM/SEC
BH CV ECHO MEAS - MV E/A: 3.1
BH CV ECHO MEAS - MV MAX PG: 7.6 MMHG
BH CV ECHO MEAS - MV MEAN PG: 3 MMHG
BH CV ECHO MEAS - MV P1/2T: 59.4 MSEC
BH CV ECHO MEAS - MV V2 VTI: 25.4 CM
BH CV ECHO MEAS - MVA(P1/2T): 3.7 CM2
BH CV ECHO MEAS - MVA(VTI): 2.28 CM2
BH CV ECHO MEAS - PA ACC TIME: 0.04 SEC
BH CV ECHO MEAS - PA V2 MAX: 114 CM/SEC
BH CV ECHO MEAS - RAP SYSTOLE: 15 MMHG
BH CV ECHO MEAS - RV MAX PG: 3.3 MMHG
BH CV ECHO MEAS - RV V1 MAX: 90.2 CM/SEC
BH CV ECHO MEAS - RV V1 VTI: 18.5 CM
BH CV ECHO MEAS - RVDD: 2.9 CM
BH CV ECHO MEAS - RVSP: 85.2 MMHG
BH CV ECHO MEAS - SV(LVOT): 57.8 ML
BH CV ECHO MEAS - SV(MOD-SP4): 34.6 ML
BH CV ECHO MEAS - SVI(LVOT): 30.2 ML/M2
BH CV ECHO MEAS - SVI(MOD-SP4): 18.1 ML/M2
BH CV ECHO MEAS - TAPSE (>1.6): 1.42 CM
BH CV ECHO MEAS - TR MAX PG: 70.2 MMHG
BH CV ECHO MEAS - TR MAX VEL: 419 CM/SEC
BH CV ECHO MEASUREMENTS AVERAGE E/E' RATIO: 10.54
BH CV XLRA - TDI S': 13.6 CM/SEC
BUN SERPL-MCNC: 30 MG/DL (ref 8–23)
BUN/CREAT SERPL: 17.5 (ref 7–25)
BURR CELLS BLD QL SMEAR: ABNORMAL
C3 FRG RBC-MCNC: ABNORMAL
CALCIUM SPEC-SCNC: 8.8 MG/DL (ref 8.6–10.5)
CHLORIDE SERPL-SCNC: 100 MMOL/L (ref 98–107)
CO2 SERPL-SCNC: 22.5 MMOL/L (ref 22–29)
CREAT SERPL-MCNC: 1.71 MG/DL (ref 0.57–1)
DEPRECATED RDW RBC AUTO: 62.7 FL (ref 37–54)
EGFRCR SERPLBLD CKD-EPI 2021: 30.5 ML/MIN/1.73
ERYTHROCYTE [DISTWIDTH] IN BLOOD BY AUTOMATED COUNT: 18.4 % (ref 12.3–15.4)
FERRITIN SERPL-MCNC: 145 NG/ML (ref 13–150)
GLUCOSE SERPL-MCNC: 96 MG/DL (ref 65–99)
HCT VFR BLD AUTO: 25 % (ref 34–46.6)
HGB BLD-MCNC: 7.1 G/DL (ref 12–15.9)
IRON 24H UR-MRATE: 11 MCG/DL (ref 37–145)
IRON SATN MFR SERPL: 5 % (ref 20–50)
LEFT ATRIUM VOLUME INDEX: 50.2 ML/M2
LV EF BIPLANE MOD: 70 %
LYMPHOCYTES # BLD MANUAL: 1.13 10*3/MM3 (ref 0.7–3.1)
LYMPHOCYTES NFR BLD MANUAL: 6 % (ref 5–12)
MAGNESIUM SERPL-MCNC: 1.9 MG/DL (ref 1.6–2.4)
MCH RBC QN AUTO: 26.5 PG (ref 26.6–33)
MCHC RBC AUTO-ENTMCNC: 28.4 G/DL (ref 31.5–35.7)
MCV RBC AUTO: 93.3 FL (ref 79–97)
METAMYELOCYTES NFR BLD MANUAL: 1 % (ref 0–0)
MONOCYTES # BLD: 0.97 10*3/MM3 (ref 0.1–0.9)
NEUTROPHILS # BLD AUTO: 13.93 10*3/MM3 (ref 1.7–7)
NEUTROPHILS NFR BLD MANUAL: 68 % (ref 42.7–76)
NEUTS BAND NFR BLD MANUAL: 18 % (ref 0–5)
OVALOCYTES BLD QL SMEAR: ABNORMAL
PHOSPHATE SERPL-MCNC: 4.5 MG/DL (ref 2.5–4.5)
PLATELET # BLD AUTO: 183 10*3/MM3 (ref 140–450)
PMV BLD AUTO: 10 FL (ref 6–12)
POIKILOCYTOSIS BLD QL SMEAR: ABNORMAL
POLYCHROMASIA BLD QL SMEAR: ABNORMAL
POTASSIUM SERPL-SCNC: 5 MMOL/L (ref 3.5–5.2)
RBC # BLD AUTO: 2.68 10*6/MM3 (ref 3.77–5.28)
SCAN SLIDE: NORMAL
SINUS: 2.9 CM
SMALL PLATELETS BLD QL SMEAR: ADEQUATE
SODIUM SERPL-SCNC: 135 MMOL/L (ref 136–145)
TACROLIMUS BLD LC/MS/MS-MCNC: 3.4 NG/ML (ref 2–20)
TIBC SERPL-MCNC: 215 MCG/DL (ref 298–536)
TRANSFERRIN SERPL-MCNC: 144 MG/DL (ref 200–360)
VARIANT LYMPHS NFR BLD MANUAL: 7 % (ref 19.6–45.3)
WBC MORPH BLD: NORMAL
WBC NRBC COR # BLD AUTO: 16.2 10*3/MM3 (ref 3.4–10.8)

## 2024-12-03 PROCEDURE — C1769 GUIDE WIRE: HCPCS | Performed by: STUDENT IN AN ORGANIZED HEALTH CARE EDUCATION/TRAINING PROGRAM

## 2024-12-03 PROCEDURE — 25010000002 HEPARIN (PORCINE) PER 1000 UNITS: Performed by: STUDENT IN AN ORGANIZED HEALTH CARE EDUCATION/TRAINING PROGRAM

## 2024-12-03 PROCEDURE — 25810000003 SODIUM CHLORIDE 0.9 % SOLUTION: Performed by: INTERNAL MEDICINE

## 2024-12-03 PROCEDURE — 83735 ASSAY OF MAGNESIUM: CPT | Performed by: INTERNAL MEDICINE

## 2024-12-03 PROCEDURE — C1725 CATH, TRANSLUMIN NON-LASER: HCPCS | Performed by: STUDENT IN AN ORGANIZED HEALTH CARE EDUCATION/TRAINING PROGRAM

## 2024-12-03 PROCEDURE — 25010000002 PROPOFOL 1000 MG/100ML EMULSION

## 2024-12-03 PROCEDURE — 37249 TRLUML BALO ANGIOP ADDL VEIN: CPT | Performed by: STUDENT IN AN ORGANIZED HEALTH CARE EDUCATION/TRAINING PROGRAM

## 2024-12-03 PROCEDURE — 93306 TTE W/DOPPLER COMPLETE: CPT

## 2024-12-03 PROCEDURE — 84466 ASSAY OF TRANSFERRIN: CPT | Performed by: INTERNAL MEDICINE

## 2024-12-03 PROCEDURE — 82728 ASSAY OF FERRITIN: CPT | Performed by: INTERNAL MEDICINE

## 2024-12-03 PROCEDURE — 85025 COMPLETE CBC W/AUTO DIFF WBC: CPT | Performed by: INTERNAL MEDICINE

## 2024-12-03 PROCEDURE — C1894 INTRO/SHEATH, NON-LASER: HCPCS | Performed by: STUDENT IN AN ORGANIZED HEALTH CARE EDUCATION/TRAINING PROGRAM

## 2024-12-03 PROCEDURE — 25010000002 HYDROMORPHONE 1 MG/ML SOLUTION: Performed by: NURSE ANESTHETIST, CERTIFIED REGISTERED

## 2024-12-03 PROCEDURE — 25010000002 HEPARIN (PORCINE) 25000-0.45 UT/250ML-% SOLUTION: Performed by: INTERNAL MEDICINE

## 2024-12-03 PROCEDURE — 63710000001 TACROLIMUS PER 1 MG: Performed by: STUDENT IN AN ORGANIZED HEALTH CARE EDUCATION/TRAINING PROGRAM

## 2024-12-03 PROCEDURE — 85007 BL SMEAR W/DIFF WBC COUNT: CPT | Performed by: INTERNAL MEDICINE

## 2024-12-03 PROCEDURE — 05753ZZ DILATION OF RIGHT SUBCLAVIAN VEIN, PERCUTANEOUS APPROACH: ICD-10-PCS | Performed by: STUDENT IN AN ORGANIZED HEALTH CARE EDUCATION/TRAINING PROGRAM

## 2024-12-03 PROCEDURE — 83540 ASSAY OF IRON: CPT | Performed by: INTERNAL MEDICINE

## 2024-12-03 PROCEDURE — 25810000003 SODIUM CHLORIDE 0.9 % SOLUTION: Performed by: STUDENT IN AN ORGANIZED HEALTH CARE EDUCATION/TRAINING PROGRAM

## 2024-12-03 PROCEDURE — 63710000001 TACROLIMUS PER 1 MG: Performed by: INTERNAL MEDICINE

## 2024-12-03 PROCEDURE — 25010000002 HEPARIN (PORCINE) 25000-0.45 UT/250ML-% SOLUTION: Performed by: STUDENT IN AN ORGANIZED HEALTH CARE EDUCATION/TRAINING PROGRAM

## 2024-12-03 PROCEDURE — 85730 THROMBOPLASTIN TIME PARTIAL: CPT | Performed by: INTERNAL MEDICINE

## 2024-12-03 PROCEDURE — 25010000002 PIPERACILLIN SOD-TAZOBACTAM PER 1 G: Performed by: STUDENT IN AN ORGANIZED HEALTH CARE EDUCATION/TRAINING PROGRAM

## 2024-12-03 PROCEDURE — 75827 VEIN X-RAY CHEST: CPT | Performed by: STUDENT IN AN ORGANIZED HEALTH CARE EDUCATION/TRAINING PROGRAM

## 2024-12-03 PROCEDURE — 057D3ZZ DILATION OF RIGHT CEPHALIC VEIN, PERCUTANEOUS APPROACH: ICD-10-PCS | Performed by: STUDENT IN AN ORGANIZED HEALTH CARE EDUCATION/TRAINING PROGRAM

## 2024-12-03 PROCEDURE — 76775 US EXAM ABDO BACK WALL LIM: CPT

## 2024-12-03 PROCEDURE — 36010 PLACE CATHETER IN VEIN: CPT | Performed by: STUDENT IN AN ORGANIZED HEALTH CARE EDUCATION/TRAINING PROGRAM

## 2024-12-03 PROCEDURE — 25010000002 PIPERACILLIN SOD-TAZOBACTAM PER 1 G: Performed by: INTERNAL MEDICINE

## 2024-12-03 PROCEDURE — 63710000001 PREDNISONE PER 5 MG: Performed by: INTERNAL MEDICINE

## 2024-12-03 PROCEDURE — 80069 RENAL FUNCTION PANEL: CPT | Performed by: INTERNAL MEDICINE

## 2024-12-03 PROCEDURE — 85730 THROMBOPLASTIN TIME PARTIAL: CPT | Performed by: STUDENT IN AN ORGANIZED HEALTH CARE EDUCATION/TRAINING PROGRAM

## 2024-12-03 PROCEDURE — 37248 TRLUML BALO ANGIOP 1ST VEIN: CPT | Performed by: STUDENT IN AN ORGANIZED HEALTH CARE EDUCATION/TRAINING PROGRAM

## 2024-12-03 PROCEDURE — 93306 TTE W/DOPPLER COMPLETE: CPT | Performed by: INTERNAL MEDICINE

## 2024-12-03 PROCEDURE — 80197 ASSAY OF TACROLIMUS: CPT | Performed by: INTERNAL MEDICINE

## 2024-12-03 PROCEDURE — 25010000002 BUPIVACAINE (PF) 0.25 % SOLUTION: Performed by: STUDENT IN AN ORGANIZED HEALTH CARE EDUCATION/TRAINING PROGRAM

## 2024-12-03 PROCEDURE — B51M1ZZ FLUOROSCOPY OF RIGHT UPPER EXTREMITY VEINS USING LOW OSMOLAR CONTRAST: ICD-10-PCS | Performed by: STUDENT IN AN ORGANIZED HEALTH CARE EDUCATION/TRAINING PROGRAM

## 2024-12-03 PROCEDURE — 25510000001 IOPAMIDOL PER 1 ML: Performed by: STUDENT IN AN ORGANIZED HEALTH CARE EDUCATION/TRAINING PROGRAM

## 2024-12-03 RX ORDER — IOPAMIDOL 755 MG/ML
INJECTION, SOLUTION INTRAVASCULAR
Status: DISPENSED
Start: 2024-12-03 | End: 2024-12-03

## 2024-12-03 RX ORDER — IOPAMIDOL 755 MG/ML
INJECTION, SOLUTION INTRAVASCULAR AS NEEDED
Status: DISCONTINUED | OUTPATIENT
Start: 2024-12-03 | End: 2024-12-03 | Stop reason: HOSPADM

## 2024-12-03 RX ORDER — DIPHENHYDRAMINE HYDROCHLORIDE 50 MG/ML
12.5 INJECTION INTRAMUSCULAR; INTRAVENOUS
Status: DISCONTINUED | OUTPATIENT
Start: 2024-12-03 | End: 2024-12-03 | Stop reason: HOSPADM

## 2024-12-03 RX ORDER — NALOXONE HCL 0.4 MG/ML
0.4 VIAL (ML) INJECTION AS NEEDED
Status: DISCONTINUED | OUTPATIENT
Start: 2024-12-03 | End: 2024-12-03 | Stop reason: HOSPADM

## 2024-12-03 RX ORDER — LABETALOL HYDROCHLORIDE 5 MG/ML
5 INJECTION, SOLUTION INTRAVENOUS
Status: DISCONTINUED | OUTPATIENT
Start: 2024-12-03 | End: 2024-12-03 | Stop reason: HOSPADM

## 2024-12-03 RX ORDER — BUPIVACAINE HYDROCHLORIDE 2.5 MG/ML
INJECTION, SOLUTION EPIDURAL; INFILTRATION; INTRACAUDAL AS NEEDED
Status: DISCONTINUED | OUTPATIENT
Start: 2024-12-03 | End: 2024-12-03 | Stop reason: HOSPADM

## 2024-12-03 RX ORDER — SODIUM CHLORIDE 9 MG/ML
20 INJECTION, SOLUTION INTRAVENOUS ONCE
Status: COMPLETED | OUTPATIENT
Start: 2024-12-03 | End: 2024-12-03

## 2024-12-03 RX ORDER — HYDRALAZINE HYDROCHLORIDE 20 MG/ML
5 INJECTION INTRAMUSCULAR; INTRAVENOUS
Status: DISCONTINUED | OUTPATIENT
Start: 2024-12-03 | End: 2024-12-03 | Stop reason: HOSPADM

## 2024-12-03 RX ORDER — FLUMAZENIL 0.1 MG/ML
0.2 INJECTION INTRAVENOUS AS NEEDED
Status: DISCONTINUED | OUTPATIENT
Start: 2024-12-03 | End: 2024-12-03 | Stop reason: HOSPADM

## 2024-12-03 RX ORDER — DIPHENHYDRAMINE HYDROCHLORIDE 50 MG/ML
12.5 INJECTION INTRAMUSCULAR; INTRAVENOUS ONCE AS NEEDED
Status: DISCONTINUED | OUTPATIENT
Start: 2024-12-03 | End: 2024-12-03 | Stop reason: HOSPADM

## 2024-12-03 RX ORDER — PROPOFOL 10 MG/ML
INJECTION, EMULSION INTRAVENOUS CONTINUOUS PRN
Status: DISCONTINUED | OUTPATIENT
Start: 2024-12-03 | End: 2024-12-03 | Stop reason: SURG

## 2024-12-03 RX ORDER — SODIUM CHLORIDE 9 MG/ML
125 INJECTION, SOLUTION INTRAVENOUS CONTINUOUS
Status: DISCONTINUED | OUTPATIENT
Start: 2024-12-03 | End: 2024-12-04

## 2024-12-03 RX ORDER — IPRATROPIUM BROMIDE AND ALBUTEROL SULFATE 2.5; .5 MG/3ML; MG/3ML
3 SOLUTION RESPIRATORY (INHALATION) ONCE AS NEEDED
Status: DISCONTINUED | OUTPATIENT
Start: 2024-12-03 | End: 2024-12-03 | Stop reason: HOSPADM

## 2024-12-03 RX ORDER — EPHEDRINE SULFATE 5 MG/ML
5 INJECTION INTRAVENOUS ONCE AS NEEDED
Status: DISCONTINUED | OUTPATIENT
Start: 2024-12-03 | End: 2024-12-03 | Stop reason: HOSPADM

## 2024-12-03 RX ADMIN — SODIUM CHLORIDE 20 ML/HR: 9 INJECTION, SOLUTION INTRAVENOUS at 07:11

## 2024-12-03 RX ADMIN — SODIUM CHLORIDE: 9 INJECTION, SOLUTION INTRAVENOUS at 07:30

## 2024-12-03 RX ADMIN — TACROLIMUS 1 MG: 1 CAPSULE ORAL at 10:54

## 2024-12-03 RX ADMIN — HYDROCODONE BITARTRATE AND ACETAMINOPHEN 1 TABLET: 7.5; 325 TABLET ORAL at 17:10

## 2024-12-03 RX ADMIN — NYSTATIN: 100000 POWDER TOPICAL at 10:58

## 2024-12-03 RX ADMIN — HYDROXYZINE HYDROCHLORIDE 25 MG: 25 TABLET ORAL at 20:31

## 2024-12-03 RX ADMIN — HYDROCODONE BITARTRATE AND ACETAMINOPHEN 1 TABLET: 7.5; 325 TABLET ORAL at 23:13

## 2024-12-03 RX ADMIN — Medication 10 ML: at 05:39

## 2024-12-03 RX ADMIN — SILDENAFIL 10 MG: 20 TABLET ORAL at 20:31

## 2024-12-03 RX ADMIN — HEPARIN SODIUM 18 UNITS/KG/HR: 10000 INJECTION, SOLUTION INTRAVENOUS at 14:59

## 2024-12-03 RX ADMIN — NYSTATIN: 100000 POWDER TOPICAL at 20:32

## 2024-12-03 RX ADMIN — Medication 12.5 MG: at 10:52

## 2024-12-03 RX ADMIN — PROPOFOL INJECTABLE EMULSION 100 MCG/KG/MIN: 10 INJECTION, EMULSION INTRAVENOUS at 07:45

## 2024-12-03 RX ADMIN — DULOXETINE HYDROCHLORIDE 20 MG: 20 CAPSULE, DELAYED RELEASE ORAL at 11:42

## 2024-12-03 RX ADMIN — PIPERACILLIN AND TAZOBACTAM 3.38 G: 3; .375 INJECTION, POWDER, FOR SOLUTION INTRAVENOUS at 05:39

## 2024-12-03 RX ADMIN — HEPARIN SODIUM 29 UNITS/KG/HR: 10000 INJECTION, SOLUTION INTRAVENOUS at 01:12

## 2024-12-03 RX ADMIN — MIDODRINE HYDROCHLORIDE 15 MG: 5 TABLET ORAL at 16:27

## 2024-12-03 RX ADMIN — QUETIAPINE FUMARATE 25 MG: 25 TABLET ORAL at 20:31

## 2024-12-03 RX ADMIN — HYDROCODONE BITARTRATE AND ACETAMINOPHEN 1 TABLET: 7.5; 325 TABLET ORAL at 10:52

## 2024-12-03 RX ADMIN — PREDNISONE 5 MG: 5 TABLET ORAL at 10:52

## 2024-12-03 RX ADMIN — TACROLIMUS 1 MG: 1 CAPSULE ORAL at 20:31

## 2024-12-03 RX ADMIN — SODIUM CHLORIDE 125 ML/HR: 9 INJECTION, SOLUTION INTRAVENOUS at 11:42

## 2024-12-03 RX ADMIN — SILDENAFIL 10 MG: 20 TABLET ORAL at 10:52

## 2024-12-03 RX ADMIN — HYDROMORPHONE HYDROCHLORIDE 0.5 MG: 1 INJECTION, SOLUTION INTRAMUSCULAR; INTRAVENOUS; SUBCUTANEOUS at 09:24

## 2024-12-03 RX ADMIN — ATORVASTATIN CALCIUM 10 MG: 10 TABLET ORAL at 10:52

## 2024-12-03 RX ADMIN — MIDODRINE HYDROCHLORIDE 15 MG: 5 TABLET ORAL at 10:52

## 2024-12-03 RX ADMIN — Medication 10 ML: at 21:23

## 2024-12-03 RX ADMIN — SILDENAFIL 10 MG: 20 TABLET ORAL at 16:27

## 2024-12-03 RX ADMIN — MIRTAZAPINE 15 MG: 15 TABLET, FILM COATED ORAL at 20:31

## 2024-12-03 RX ADMIN — PIPERACILLIN AND TAZOBACTAM 3.38 G: 3; .375 INJECTION, POWDER, FOR SOLUTION INTRAVENOUS at 20:35

## 2024-12-03 RX ADMIN — FAMOTIDINE 20 MG: 20 TABLET, FILM COATED ORAL at 10:52

## 2024-12-03 RX ADMIN — PIPERACILLIN AND TAZOBACTAM 3.38 G: 3; .375 INJECTION, POWDER, FOR SOLUTION INTRAVENOUS at 12:45

## 2024-12-03 NOTE — PLAN OF CARE
Goal Outcome Evaluation:  Plan of Care Reviewed With: patient        Progress: improving  Outcome Evaluation: pt is more alert and less confused this shift, HR remains high with low B/P, called NP she ordered albumin IV,  c/o pain tylenol and ice was used, still on heparin drip, remain in c-diff r/o precautions awaiting test results, NPO since midnight for fistulogram R arm 0706 12/3/2024, rested off and on during the night

## 2024-12-03 NOTE — OP NOTE
Name: Jaja Carcamo ADMIT: 2024   : 1945  PCP: Kvng Guillen MD    MRN: [id number] LOS: 3 days   AGE/SEX: 79 y.o. female  ROOM:  [facility] 272/A     [facility]      Preoperative Diagnosis:   Right arm swelling  Deep vein thrombosis right internal jugular vein  History of right arm arteriovenous fistula  Central venous stenosis    Postoperative Diagnosis:   Same  High-grade stenosis of right cephalic vein and subclavian vein    Procedure Performed:   Right arm fistulogram  Central venogram  Balloon angioplasty of right cephalic vein with 7 mm, 10 mm, and 12 mm angioplasty balloons  Balloon angioplasty of right subclavian vein with 7mm, 10mm, and 12mm angioplasty balloons    Surgeon:   Paulino Alva II, MD    Assistant:    Vickie Renner RN, Provided critical assistance in exposure, retraction, and suction that overall decrease blood loss and operative time.    Anesthesia:   MAC with local    Estimated Blood Loss:   Minimal    Findings:    Right arm fistulogram: Right arm cephalic vein fistula widely patent and quite large and tortuous.  Widely patent from antecubital fossa up to shoulder.  High-grade stenosis in cephalic vein and cephalic arch just prior to confluence with subclavian vein.  Subclavian vein with high-grade stenosis.  Superior vena cava widely patent, contrast refluxing into origin of right internal jugular vein without evidence of thrombosis of the internal jugular vein in the chest.  The cephalic and subclavian vein stenoses were treated with balloon angioplasty sequentially with 7 mm followed by 10 mm followed by 12 mm angioplasty balloons with improved flow through the fistula and decreased backfilling of venous collaterals.  Improved thrill in fistula with less pulsatility.    Implants:    none    Staff:   Circulator: Loyd Troncoso, GHASSAN; Yvette Botello RN  Radiology Technologist: Milton Maldonado, RRT; Krysta Glaser  Scrub Person: Krystal Bills; Amaya  Liana  Assistant: Tash Eid CSA    Specimen:   none    Complications:   none    Dispo:   to PACU    Indication for procedure:   79 y.o. female with end-stage renal disease previously on dialysis who has a right arm arteriovenous fistula that she has not used in approximately 8 years since she had a kidney transplant.  Patient developed some acute right arm swelling and was diagnosed with a right internal jugular vein DVT.  Clinically there is concern for central venous stenosis causing her right arm swelling.  Plans made for fistulogram with possible intervention.  Details of this procedure including risk benefits and alternatives discussed with patient and her son and they verbalized understanding and agreed to proceed.    Description of procedure:   Patient is brought to the hybrid and play supine on the table.  Sedation was initiated by the anesthesia service.  Right arm was prepped with ChloraPrep and draped in sterile fashion.  A timeout was performed.  I began procedure by accessing the cephalic vein in the distal upper arm with a microneedle after I infiltrated some local anesthetic.  Microwire was inserted and placement was confirmed with fluoroscopy.  We then exchanged microneedle for microsheath and performed a fistulogram with the above listed findings.  A 0.035 stiff angled Glidewire was advanced to the micro sheath and into the cephalic arch.  Exchanged the micro sheath for a 10 cm 7 Tajik sheath.  Resting catheter was used to cross the cephalic arch and the subclavian vein and I was able to establish the Joyce catheter within the superior vena cava.  Angiography was performed through the catheter to confirm intraluminal placement.  Wire was reinserted through the catheter and the catheter was then removed.  We then advanced a 7 mm angioplasty balloon over the wire and performed balloon angioplasty of both lesions.  Completion angiography showed slightly improved flow but still  some stenosis.  We then repeated angioplasty with 10 mm angioplasty balloon.  Completion angiography showed no complication and improved flow still but the fistula still felt somewhat pulsatile.  Finally we obtained a 12 mm [place] balloon and repeated balloon angioplasty with this.  Completion angiography after this showed improved flow with significantly less backfilling of collaterals.  Additionally the fistula is less pulsatile.  We then removed the balloon and the wire.  It appeared the patient may have some stenosis within the axillary vein on I consider briefly accessing the basilic vein to treat this however I was concerned that arterial flow from the cephalic vein fistula would reflux making her arm swelling worse.  Additionally I performed an ultrasound on the table of the axillary and basilic and brachial veins and they did not appear to be especially distended or pulsatile.  Decision was made not to intervene there.  The sheath was removed and a bolster stitch was placed.  Sterile dressing was placed and the patient was awakened from sedation and transferred to PACU in good condition.  Patient tolerated the procedure well and there were no intraoperative complications and all wires catheters sheaths and other devices were removed and found to be whole and intact prior to the conclusion of the procedure.  I discussed the outcome of the procedure with the patient's son in the waiting room.      Paulino Alva II, MD  11/29/2024      Patient Active Problem List    Diagnosis     *Acute deep vein thrombosis (DVT) of other vein of right upper extremity [I82.621]     Acute DVT (deep venous thrombosis) [I82.409]     C. difficile colitis [A04.72]     UTI (urinary tract infection) [N39.0]     Acute exacerbation of CHF (congestive heart failure) [I50.9]     Atrial fibrillation with rapid ventricular response [I48.91]     Volume overload [E87.70]     Fall [W19.XXXA]     History of suicide attempt [Z91.51]      Osteoarthritis [M19.90]     Valvular heart disease [I38]     Acute UTI [N39.0]     History of lobectomy of lung [Z90.2]     Long term current use of anticoagulant therapy [Z79.01]     Chronic hypoxemic respiratory failure [J96.11]     COPD (chronic obstructive pulmonary disease) [J44.9]     Hypothyroidism, unspecified [E03.9]     Wegener's granulomatosis with renal involvement [M31.31]     Essential (primary) hypertension [I10]     Menopausal flushing [N95.1]     Long term (current) use of immunosuppressive biologic [Z79.620]     Edema of both lower extremities due to peripheral venous insufficiency [I87.2]     History of DVT (deep vein thrombosis) [Z86.718]     Personal history of peptic ulcer disease [Z87.11]     Gastroesophageal reflux disease [K21.9]     Nonobstructive atherosclerosis of coronary artery [I25.10]     Irritable bowel syndrome [K58.9]     Obsessive-compulsive disorder [F42.9]     Chronic diarrhea [K52.9]     History of lung cancer [Z85.118]     Seasonal allergies [J30.2]     Gout [M10.9]     Hearing loss [H91.90]     Asthma [J45.909]     History of kidney transplant [Z94.0]     Paroxysmal supraventricular tachycardia [I47.10]     Paroxysmal atrial fibrillation [I48.0]     Tricuspid valve regurgitation [I07.1]     Pulmonary hypertension [I27.20]     Mitral valve regurgitation [I34.0]     Mixed anxiety and depressive disorder [F41.8]     Allergic rhinitis [J30.9]     Chronic pain [G89.29]     Hyperlipidemia [E78.5]     Peripheral neuropathy [G62.9]     Primary osteoarthritis of right hip [M16.11]     History of repair of hip joint [Z98.890]     Osteoarthritis of left hip [M16.12]

## 2024-12-03 NOTE — SIGNIFICANT NOTE
12/03/24 0929   OTHER   Discipline physical therapist   Rehab Time/Intention   Session Not Performed patient unavailable for evaluation  (Pt off the floor for fistulogram and angioplasty right upper extremity.)   Therapy Assessment/Plan (PT)   Criteria for Skilled Interventions Met (PT) yes;skilled treatment is necessary   Recommendation   PT - Next Appointment 12/04/24

## 2024-12-03 NOTE — PROGRESS NOTES
"RENAL/KCC:     LOS: 3 days    Patient Care Team:  Kvng Guillen MD as PCP - General (Family Medicine)  Jak Gavin MD as Cardiologist (Cardiology)    Chief Complaint:  TONYA/CKD, Kidney transplant    Subjective     Interval History:   Chart reviewed  Oliguric  S/P fistulogram    Objective     Vital Sign Min/Max for last 24 hours  Temp  Min: 97.6 °F (36.4 °C)  Max: 98.3 °F (36.8 °C)   BP  Min: 73/38  Max: 121/63   Pulse  Min: 100  Max: 120   Resp  Min: 13  Max: 22   SpO2  Min: 93 %  Max: 100 %   Flow (L/min) (Oxygen Therapy)  Min: 8  Max: 8   No data recorded     Flowsheet Rows      Flowsheet Row First Filed Value   Admission Height 167.6 cm (66\") Documented at 11/29/2024 1238   Admission Weight 74.5 kg (164 lb 3.9 oz) Documented at 11/29/2024 1238            No intake/output data recorded.  I/O last 3 completed shifts:  In: 1800 [P.O.:600; I.V.:1000; IV Piggyback:200]  Out: -     Physical Exam:  GEN: Awake, NAD  ENT: PERRL, EOMI, MMM  NECK: Supple, no JVD  CHEST: CTAB, no W/R/C  CV: RRR, no M/G/R  ABD: Soft, NT, +BS  SKIN: Warm and Dry  NEURO: CN's intact      WBC WBC   Date Value Ref Range Status   12/03/2024 16.20 (H) 3.40 - 10.80 10*3/mm3 Final   12/02/2024 12.86 (H) 3.40 - 10.80 10*3/mm3 Final   12/01/2024 8.63 3.40 - 10.80 10*3/mm3 Final      HGB Hemoglobin   Date Value Ref Range Status   12/03/2024 7.1 (L) 12.0 - 15.9 g/dL Final   12/02/2024 8.1 (L) 12.0 - 15.9 g/dL Final   12/01/2024 8.0 (L) 12.0 - 15.9 g/dL Final      HCT Hematocrit   Date Value Ref Range Status   12/03/2024 25.0 (L) 34.0 - 46.6 % Final   12/02/2024 28.5 (L) 34.0 - 46.6 % Final   12/01/2024 28.4 (L) 34.0 - 46.6 % Final      Platlets No results found for: \"LABPLAT\"   MCV MCV   Date Value Ref Range Status   12/03/2024 93.3 79.0 - 97.0 fL Final   12/02/2024 92.2 79.0 - 97.0 fL Final   12/01/2024 94.0 79.0 - 97.0 fL Final          Sodium Sodium   Date Value Ref Range Status   12/03/2024 135 (L) 136 - 145 mmol/L Final   12/02/2024 137 136 - " "145 mmol/L Final   12/01/2024 136 136 - 145 mmol/L Final      Potassium Potassium   Date Value Ref Range Status   12/03/2024 5.0 3.5 - 5.2 mmol/L Final     Comment:     Specimen hemolyzed.  Result may be falsely elevated.   12/02/2024 4.7 3.5 - 5.2 mmol/L Final     Comment:     Specimen hemolyzed.  Result may be falsely elevated.   12/01/2024 4.8 3.5 - 5.2 mmol/L Final      Chloride Chloride   Date Value Ref Range Status   12/03/2024 100 98 - 107 mmol/L Final   12/02/2024 101 98 - 107 mmol/L Final   12/01/2024 100 98 - 107 mmol/L Final      CO2 CO2   Date Value Ref Range Status   12/03/2024 22.5 22.0 - 29.0 mmol/L Final   12/02/2024 27.5 22.0 - 29.0 mmol/L Final   12/01/2024 26.4 22.0 - 29.0 mmol/L Final      BUN BUN   Date Value Ref Range Status   12/03/2024 30 (H) 8 - 23 mg/dL Final   12/02/2024 24 (H) 8 - 23 mg/dL Final   12/01/2024 21 8 - 23 mg/dL Final      Creatinine Creatinine   Date Value Ref Range Status   12/03/2024 1.71 (H) 0.57 - 1.00 mg/dL Final   12/02/2024 1.08 (H) 0.57 - 1.00 mg/dL Final   12/01/2024 0.87 0.57 - 1.00 mg/dL Final      Calcium Calcium   Date Value Ref Range Status   12/03/2024 8.8 8.6 - 10.5 mg/dL Final   12/02/2024 8.6 8.6 - 10.5 mg/dL Final   12/01/2024 8.9 8.6 - 10.5 mg/dL Final      PO4 No results found for: \"CAPO4\"   Albumin Albumin   Date Value Ref Range Status   12/03/2024 3.4 (L) 3.5 - 5.2 g/dL Final   12/02/2024 2.6 (L) 3.5 - 5.2 g/dL Final   12/02/2024 2.7 (L) 3.5 - 5.2 g/dL Final      Magnesium Magnesium   Date Value Ref Range Status   12/03/2024 1.9 1.6 - 2.4 mg/dL Final   12/02/2024 1.8 1.6 - 2.4 mg/dL Final   12/01/2024 1.9 1.6 - 2.4 mg/dL Final      Uric Acid No results found for: \"URICACID\"        Results Review:     I reviewed the patient's new clinical results.    [Transfer Hold] atorvastatin, 10 mg, Oral, Daily  [Transfer Hold] cholestyramine light, 1 packet, Oral, Q24H  [Transfer Hold] DULoxetine, 20 mg, Oral, Daily  famotidine, 20 mg, Oral, Daily  [Transfer Hold] " hydrOXYzine, 25 mg, Oral, Nightly  iopamidol, , ,   [Transfer Hold] levothyroxine, 50 mcg, Oral, Q AM  metoprolol tartrate, 12.5 mg, Oral, Q12H  [Transfer Hold] midodrine, 15 mg, Oral, TID AC  [Transfer Hold] mirtazapine, 15 mg, Oral, Nightly  [Transfer Hold] nystatin, , Topical, Q12H  piperacillin-tazobactam, 3.375 g, Intravenous, Q8H  [Transfer Hold] predniSONE, 5 mg, Oral, Daily With Breakfast  [Transfer Hold] QUEtiapine, 25 mg, Oral, Nightly  [Transfer Hold] sildenafil, 10 mg, Oral, TID  [Transfer Hold] sodium chloride, 10 mL, Intravenous, Q12H  [Transfer Hold] tacrolimus, 1 mg, Oral, BID      heparin, 18 Units/kg/hr, Last Rate: Stopped (12/03/24 0640)        Medication Review: Reviewed    Assessment & Plan     1) Kidney Transplant  2) TONYA - Cr bumped to 1.08  3) DVT  4) Hypotension  5) Anemia    Plan: Cr jumped to 1.7 = likely ATN related to hypotension, reduced renal perfusion.  Lytes OK.  Lasix on hold.  Midodrine for BP support.  Will give some IVF today and reassess in AM.  Check transplant US as well.  On AC and Vascular following.  S/P fistulogram today.  Worsening anemia with Hgb down to 7.1.  Check iron.  Also with increasing Leukocytosis.  ABX per primary.  Pulm following as well.        Naun Falcon MD  [facility]  12/03/24  08:05 EST

## 2024-12-03 NOTE — NURSING NOTE
WOCN note:    79 yr old female admitted 11/29/24 with DVT of RUE. WOCN consult received for compression wraps to BLE. Patient reports routine compression wraps done at her SNF. No edema is noted to the lower legs and feet at this time. There is a small partial thickness wound to her right lateral lower leg and a full thickness wound to her right knee. These were covered with silicone foam dressings.   There is also a lesion noted to the right arm at the elbow. It is not currently open or draining. A stockinette was placed over the right arm and a light ACE wrap was applied from the wrist to the shoulder. Both arms elevated on pillows.     Patient also reported pain to her sacrum. There is some non-blanchable erythema noted and patient was positioned on her right side. Recommend to initiate pressure injury prevention measures with foam wedges for sacral off loading. Will follow as needed.

## 2024-12-03 NOTE — PROGRESS NOTES
[facility] MEDICINE SERVICE  DAILY PROGRESS NOTE    NAME: Jaja Carcamo  : 1945  MRN: [id number]      LOS: 3 days     PROVIDER OF SERVICE: Khris Tomas MD    Chief Complaint: Acute deep vein thrombosis (DVT) of other vein of right upper extremity    Subjective:     Interval History:  History taken from: patient    History of Present Illness: Jaja Carcamo is a 79 y.o. female with a CMH of atrial flutter, COPD, previous history of ESRD now with renal transplant, hypertension who presents to the hospital with complaints of right arm swelling and pain.  The patient is a poor historian but was able to tell me that she has an AV fistula on her right arm for previous history of ESRD on dialysis.  However she received a renal transplant a few years ago and has not required hemodialysis since then.  She did notice that over the past few weeks she has had increasing right upper extremity pain and swelling that is extending all the way up to her right shoulder.  She is complaining of some enlargement of veins in her right upper arm and shoulder area.  She also complains of pain in that area.  She denies any shortness of breath, cough, congestion, fevers, chills.  RUE ultrasound in the ER did show extensive DVT of the right internal jugular vein.       seen in bed LINNEA, VSS, DW RN,   examined in bed acute distress, dyspnea on 8 L of oxygen, patient with significant edema left right upper extremity.  Poor access, ordered PICC team for line placement.  Discussed with RN.  24 patient seen in bed no acute distress, dyspnea on 8 L, patient with hypotension.  Will increase midodrine, received fluid bolus.  Will give albumin.  Discussed with RN.  12/3/24 seen in bed NAD, BP 94/56. Wbc 16, underwent:Right arm fistulogram  Central venogram  Balloon angioplasty of right cephalic vein with 7 mm, 10 mm, and 12 mm angioplasty balloons  Balloon angioplasty of right subclavian vein with 7mm, 10mm, and  12mm angioplasty balloons    Review of Systems  10 point ROS note except for above  Objective:     Vital Signs  Temp:  [97.3 °F (36.3 °C)-98.1 °F (36.7 °C)] 97.8 °F (36.6 °C)  Heart Rate:  [100-114] 111  Resp:  [10-22] 20  BP: ()/(45-73) 94/56  Flow (L/min) (Oxygen Therapy):  [3-10] 5   Body mass index is 29.05 kg/m².    Physical Exam  General Appearance:  Alert, cooperative, no distress, appears stated age  Head:   Normocephalic, without obvious abnormality, atraumatic  Eyes:   PERRL, conjunctiva/corneas clear, EOM's intact, fundi benign, both eyes  Ears:    Normal TM's and external ear canals, both ears  Nose:Nares normal, septum midline, mucosa normal, no drainage or sinus tenderness  Throat:Lips, mucosa, and tongue normal; teeth and gums normal  Neck:Supple, symmetrical, trachea midline, no adenopathy, thyroid: not enlarged, symmetric, no tenderness/mass/nodules, no carotid bruit or JVD  Lungs:             Clear to auscultation bilaterally, respirations unlabored  Heart:  Regular rate and rhythm, S1, S2 normal, no murmur, rub or gallop  Abdomen:  Soft, non-tender, bowel sounds active all four quadrants,  no masses, no organomegaly  Extremities:RUE nonpitting edema with nonfunctioning AV fistula, enlargement of right upper arm veins  Pulses:2+ and symmetric  Skin:Skin color, texture, turgor normal, no rashes or lesions  Neurologic: Normal     Diagnostic Data    Results from last 7 days   Lab Units 12/03/24  0051 12/02/24  0018   WBC 10*3/mm3 16.20* 12.86*   HEMOGLOBIN g/dL 7.1* 8.1*   HEMATOCRIT % 25.0* 28.5*   PLATELETS 10*3/mm3 183 200   GLUCOSE mg/dL 96 81   CREATININE mg/dL 1.71* 1.08*   BUN mg/dL 30* 24*   SODIUM mmol/L 135* 137   POTASSIUM mmol/L 5.0 4.7   AST (SGOT) U/L  --  18   ALT (SGPT) U/L  --  5   ALK PHOS U/L  --  60   BILIRUBIN mg/dL  --  0.6   ANION GAP mmol/L 12.5 8.5       US Renal Limited    Result Date: 12/3/2024  Impression: 1. Right lower pelvic renal transplant without hydronephrosis.  2. Right lower quadrant ascites. Electronically Signed: Franck Cortez MD  12/3/2024 12:37 PM EST  Workstation ID: [id number]    XR Chest 1 View    Result Date: 12/1/2024  Impression: 1.Significantly improved aeration of the left lung base. 2.Congestive heart failure, which appears mildly improved. Electronically Signed: German Chavira MD  12/1/2024 4:09 PM EST  Workstation ID: [id number]       I reviewed the patient's new clinical results.    Assessment/Plan:     Active and Resolved Problems  Active Hospital Problems    Diagnosis  POA    **Acute deep vein thrombosis (DVT) of other vein of right upper extremity [I82.621]  Yes    Acute DVT (deep venous thrombosis) [I82.409]  Yes      Resolved Hospital Problems   No resolved problems to display.      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  -Nephrology consult     Hypothyroidism  -Continue Synthroid    VTE Prophylaxis:  Pharmacologic VTE prophylaxis orders are present.    Disposition Planning:   Barriers to Discharge:dvt  Anticipated Date of Discharge:  12/3  Place of Discharge: home      Time: 35 minutes     There are no questions and answers to display.       Signature: Electronically signed by Khris Tomas MD, 12/03/24, 14:57 EST.  [facility] Hospitalist Team

## 2024-12-03 NOTE — PLAN OF CARE
Problem: Adult Inpatient Plan of Care  Goal: Plan of Care Review  Outcome: Progressing  Goal: Patient-Specific Goal (Individualized)  Outcome: Progressing  Goal: Absence of Hospital-Acquired Illness or Injury  Outcome: Progressing  Intervention: Identify and Manage Fall Risk  Goal: Optimal Comfort and Wellbeing  Outcome: Progressing  Goal: Readiness for Transition of Care  Outcome: Progressing     Problem: Comorbidity Management  Goal: Maintenance of COPD Symptom Control  Outcome: Progressing  Goal: Blood Pressure in Desired Range  Outcome: Progressing     Problem: Skin Injury Risk Increased  Goal: Skin Health and Integrity  Outcome: Progressing     Problem: Fall Injury Risk  Goal: Absence of Fall and Fall-Related Injury  Outcome: Progressing  Intervention: Promote Injury-Free Environment     Problem: Breathing Pattern Ineffective  Goal: Effective Breathing Pattern  Outcome: Progressing     Problem: Sepsis/Septic Shock  Goal: Optimal Coping  Outcome: Progressing  Goal: Absence of Bleeding  Outcome: Progressing  Goal: Blood Glucose Level Within Target Range  Outcome: Progressing  Goal: Absence of Infection Signs and Symptoms  Outcome: Progressing  Goal: Optimal Nutrition Delivery  Outcome: Progressing   Goal Outcome Evaluation:   Pt remains on heparin drip, no complaints of pain, resting in bed

## 2024-12-03 NOTE — ANESTHESIA PREPROCEDURE EVALUATION
Anesthesia Evaluation     NPO Solid Status: > 8 hours  NPO Liquid Status: > 8 hours           Airway   Mallampati: II  TM distance: >3 FB  Neck ROM: full  No difficulty expected  Dental - normal exam     Pulmonary - normal exam   (+) lung cancer, COPD moderate, asthma,  Cardiovascular - normal exam    (+) hypertension, valvular problems/murmurs, past MI , CAD, dysrhythmias Atrial Fib, CHF , DVT, hyperlipidemia    ROS comment: Normal left ventricle intracavitary chamber size.  There is mild concentric left ventricular hypertrophy.  Calculated left ventricular ejection fraction of 58 % (Biplane Amato's  method).  Normal right ventricle intracavitary chamber size.  The right ventricular systolic function is mildly reduced.    Severe biatrial enlargement.  There is moderate mitral regurgitation.  There is moderate tricuspid regurgitation.  Calculated pulmonary systolic pressure of 72 mmHg.  Dilated inferior vena cava consistent with a mean right atrial pressure of 15  mmHg.          Neuro/Psych  (+) numbness, psychiatric history  GI/Hepatic/Renal/Endo    (+) GERD well controlled, renal disease (kidney transplant)-, thyroid problem hypothyroidism    Musculoskeletal     Abdominal  - normal exam    Bowel sounds: normal.   Substance History      OB/GYN          Other   arthritis,   history of cancer remission                  Anesthesia Plan    ASA 4     MAC   total IV anesthesia  intravenous induction     Anesthetic plan, risks, benefits, and alternatives have been provided, discussed and informed consent has been obtained with: patient.  Pre-procedure education provided  Plan discussed with CRNA.    CODE STATUS:

## 2024-12-03 NOTE — PLAN OF CARE
Goal Outcome Evaluation:  Plan of Care Reviewed With: patient        Progress: improving  Outcome Evaluation: Patient more alert with intermittent confusion. Fistulogram this morning with Dr. Alva. Echo obtained, see report. NS at 125 cc/hr started per nephrology. US Renal obtained, see report. Heparin gtt stopped at 0640 and restarted at 1459, see MAR. Patient still requiring oxygen between 3-5 LPM. Blood pressures better today. Palliative consulted noted. Wound care nurse seen patient. Patient repositioned by staff every two hours to prevent skin breakdown. Fall precautions remain in place and call light within reach.

## 2024-12-03 NOTE — ANESTHESIA POSTPROCEDURE EVALUATION
Patient: Jaja Carcamo    Procedure Summary       Date: 12/03/24 Room / Location: [facility] OR  / [facility] HYBRID OR    Anesthesia Start: 0730 Anesthesia Stop: 0827    Procedure: FISTULOGRAM  and angioplasty RIGHT ARM (Right) Diagnosis:     Surgeons: Paulino Alva II, MD Provider: Dl Fuentes MD    Anesthesia Type: MAC ASA Status: 4            Anesthesia Type: MAC    Vitals  Vitals Value Taken Time   /65 12/03/24 0841   Temp 98.1 °F (36.7 °C) 12/03/24 0831   Pulse 105 12/03/24 0846   Resp 19 12/03/24 0846   SpO2 91 % 12/03/24 0846           Post Anesthesia Care and Evaluation    Patient location during evaluation: PACU  Patient participation: complete - patient participated  Level of consciousness: awake  Pain scale: See nurse's notes for pain score.  Pain management: adequate    Airway patency: patent  Anesthetic complications: No anesthetic complications  PONV Status: none  Cardiovascular status: acceptable  Respiratory status: acceptable and spontaneous ventilation  Hydration status: acceptable    Comments: Patient seen and examined postoperatively; vital signs stable; SpO2 greater than or equal to 90%; cardiopulmonary status stable; nausea/vomiting adequately controlled; pain adequately controlled; no apparent anesthesia complications; patient discharged from anesthesia care when discharge criteria were met

## 2024-12-04 ENCOUNTER — APPOINTMENT (OUTPATIENT)
Dept: CARDIOLOGY | Facility: HOSPITAL | Age: 79
End: 2024-12-04
Payer: MEDICARE

## 2024-12-04 LAB
ALBUMIN SERPL-MCNC: 3.2 G/DL (ref 3.5–5.2)
ANION GAP SERPL CALCULATED.3IONS-SCNC: 11.2 MMOL/L (ref 5–15)
APTT PPP: 82.1 SECONDS (ref 22.7–35.4)
APTT PPP: 95.2 SECONDS (ref 22.7–35.4)
BASOPHILS # BLD AUTO: 0.03 10*3/MM3 (ref 0–0.2)
BASOPHILS NFR BLD AUTO: 0.2 % (ref 0–1.5)
BH CV XLRA MEAS DIST REN A EDV RIGHT: 10 CM/S
BH CV XLRA MEAS DIST REN A PSV RIGHT: 48 CM/S
BH CV XLRA MEAS KID L RIGHT: 11.3 CM
BH CV XLRA MEAS KID W RIGHT: 3.1 CM
BH CV XLRA MEAS MID REN A EDV RIGHT: 0 CM/S
BH CV XLRA MEAS MID REN A PSV RIGHT: 82 CM/S
BH CV XLRA MEAS PROX REN A EDV RIGHT: 0 CM/S
BH CV XLRA MEAS PROX REN A PSV RIGHT: 82 CM/S
BH CV XLRA MEAS RAR RIGHT: 0.76
BH CV XLRA MEAS RENAL A ORG EDV RIGHT: 18 CM/S
BH CV XLRA MEAS RENAL A ORG PSV RIGHT: 154 CM/S
BUN SERPL-MCNC: 33 MG/DL (ref 8–23)
BUN/CREAT SERPL: 14.2 (ref 7–25)
CALCIUM SPEC-SCNC: 8.8 MG/DL (ref 8.6–10.5)
CHLORIDE SERPL-SCNC: 100 MMOL/L (ref 98–107)
CO2 SERPL-SCNC: 21.8 MMOL/L (ref 22–29)
CREAT SERPL-MCNC: 2.33 MG/DL (ref 0.57–1)
DEPRECATED RDW RBC AUTO: 62.3 FL (ref 37–54)
EGFRCR SERPLBLD CKD-EPI 2021: 21.1 ML/MIN/1.73
EOSINOPHIL # BLD AUTO: 0.04 10*3/MM3 (ref 0–0.4)
EOSINOPHIL NFR BLD AUTO: 0.3 % (ref 0.3–6.2)
ERYTHROCYTE [DISTWIDTH] IN BLOOD BY AUTOMATED COUNT: 18.6 % (ref 12.3–15.4)
GLUCOSE SERPL-MCNC: 127 MG/DL (ref 65–99)
HCT VFR BLD AUTO: 25.9 % (ref 34–46.6)
HGB BLD-MCNC: 7.3 G/DL (ref 12–15.9)
IMM GRANULOCYTES # BLD AUTO: 0.12 10*3/MM3 (ref 0–0.05)
IMM GRANULOCYTES NFR BLD AUTO: 0.8 % (ref 0–0.5)
LYMPHOCYTES # BLD AUTO: 1.43 10*3/MM3 (ref 0.7–3.1)
LYMPHOCYTES NFR BLD AUTO: 9.5 % (ref 19.6–45.3)
MAGNESIUM SERPL-MCNC: 1.8 MG/DL (ref 1.6–2.4)
MCH RBC QN AUTO: 25.9 PG (ref 26.6–33)
MCHC RBC AUTO-ENTMCNC: 28.2 G/DL (ref 31.5–35.7)
MCV RBC AUTO: 91.8 FL (ref 79–97)
MONOCYTES # BLD AUTO: 0.98 10*3/MM3 (ref 0.1–0.9)
MONOCYTES NFR BLD AUTO: 6.5 % (ref 5–12)
NEUTROPHILS NFR BLD AUTO: 12.44 10*3/MM3 (ref 1.7–7)
NEUTROPHILS NFR BLD AUTO: 82.7 % (ref 42.7–76)
NRBC BLD AUTO-RTO: 0.2 /100 WBC (ref 0–0.2)
PHOSPHATE SERPL-MCNC: 4.2 MG/DL (ref 2.5–4.5)
PLATELET # BLD AUTO: 192 10*3/MM3 (ref 140–450)
PMV BLD AUTO: 10.2 FL (ref 6–12)
POTASSIUM SERPL-SCNC: 4.6 MMOL/L (ref 3.5–5.2)
RBC # BLD AUTO: 2.82 10*6/MM3 (ref 3.77–5.28)
RIGHT RENAL UPPER PARENCHYMA MAX: 19 CM/S
RIGHT RENAL UPPER PARENCHYMA MIN: 4.3 CM/S
RIGHT RENAL UPPER PARENCHYMA RI: 0.77
SODIUM SERPL-SCNC: 133 MMOL/L (ref 136–145)
WBC NRBC COR # BLD AUTO: 15.04 10*3/MM3 (ref 3.4–10.8)

## 2024-12-04 PROCEDURE — 85730 THROMBOPLASTIN TIME PARTIAL: CPT | Performed by: STUDENT IN AN ORGANIZED HEALTH CARE EDUCATION/TRAINING PROGRAM

## 2024-12-04 PROCEDURE — 80069 RENAL FUNCTION PANEL: CPT | Performed by: STUDENT IN AN ORGANIZED HEALTH CARE EDUCATION/TRAINING PROGRAM

## 2024-12-04 PROCEDURE — 25010000002 PIPERACILLIN SOD-TAZOBACTAM PER 1 G: Performed by: STUDENT IN AN ORGANIZED HEALTH CARE EDUCATION/TRAINING PROGRAM

## 2024-12-04 PROCEDURE — 25010000002 FUROSEMIDE PER 20 MG: Performed by: INTERNAL MEDICINE

## 2024-12-04 PROCEDURE — 63710000001 PREDNISONE PER 5 MG: Performed by: STUDENT IN AN ORGANIZED HEALTH CARE EDUCATION/TRAINING PROGRAM

## 2024-12-04 PROCEDURE — 83735 ASSAY OF MAGNESIUM: CPT | Performed by: STUDENT IN AN ORGANIZED HEALTH CARE EDUCATION/TRAINING PROGRAM

## 2024-12-04 PROCEDURE — 25010000002 HEPARIN (PORCINE) 25000-0.45 UT/250ML-% SOLUTION: Performed by: STUDENT IN AN ORGANIZED HEALTH CARE EDUCATION/TRAINING PROGRAM

## 2024-12-04 PROCEDURE — 93976 VASCULAR STUDY: CPT

## 2024-12-04 PROCEDURE — 99232 SBSQ HOSP IP/OBS MODERATE 35: CPT | Performed by: NURSE PRACTITIONER

## 2024-12-04 PROCEDURE — 85025 COMPLETE CBC W/AUTO DIFF WBC: CPT | Performed by: STUDENT IN AN ORGANIZED HEALTH CARE EDUCATION/TRAINING PROGRAM

## 2024-12-04 PROCEDURE — 93976 VASCULAR STUDY: CPT | Performed by: STUDENT IN AN ORGANIZED HEALTH CARE EDUCATION/TRAINING PROGRAM

## 2024-12-04 PROCEDURE — 25010000002 EPOETIN ALFA-EPBX 20000 UNIT/ML SOLUTION: Performed by: INTERNAL MEDICINE

## 2024-12-04 PROCEDURE — 63710000001 TACROLIMUS PER 1 MG: Performed by: STUDENT IN AN ORGANIZED HEALTH CARE EDUCATION/TRAINING PROGRAM

## 2024-12-04 PROCEDURE — 25010000002 NA FERRIC GLUC CPLX PER 12.5 MG: Performed by: INTERNAL MEDICINE

## 2024-12-04 RX ORDER — SODIUM CHLORIDE 0.9 % (FLUSH) 0.9 %
10 SYRINGE (ML) INJECTION AS NEEDED
Status: DISCONTINUED | OUTPATIENT
Start: 2024-12-04 | End: 2024-12-19 | Stop reason: HOSPADM

## 2024-12-04 RX ORDER — SODIUM CHLORIDE 0.9 % (FLUSH) 0.9 %
20 SYRINGE (ML) INJECTION AS NEEDED
Status: DISCONTINUED | OUTPATIENT
Start: 2024-12-04 | End: 2024-12-19 | Stop reason: HOSPADM

## 2024-12-04 RX ORDER — SODIUM CHLORIDE 0.9 % (FLUSH) 0.9 %
10 SYRINGE (ML) INJECTION EVERY 12 HOURS SCHEDULED
Status: DISCONTINUED | OUTPATIENT
Start: 2024-12-04 | End: 2024-12-19 | Stop reason: HOSPADM

## 2024-12-04 RX ORDER — FUROSEMIDE 10 MG/ML
40 INJECTION INTRAMUSCULAR; INTRAVENOUS ONCE
Status: COMPLETED | OUTPATIENT
Start: 2024-12-04 | End: 2024-12-04

## 2024-12-04 RX ADMIN — MIRTAZAPINE 15 MG: 15 TABLET, FILM COATED ORAL at 21:28

## 2024-12-04 RX ADMIN — FUROSEMIDE 40 MG: 10 INJECTION, SOLUTION INTRAMUSCULAR; INTRAVENOUS at 10:56

## 2024-12-04 RX ADMIN — Medication 12.5 MG: at 21:28

## 2024-12-04 RX ADMIN — SODIUM CHLORIDE 125 MG: 9 INJECTION, SOLUTION INTRAVENOUS at 09:15

## 2024-12-04 RX ADMIN — LEVOTHYROXINE SODIUM 50 MCG: 50 TABLET ORAL at 05:38

## 2024-12-04 RX ADMIN — HYDROXYZINE HYDROCHLORIDE 25 MG: 25 TABLET ORAL at 21:28

## 2024-12-04 RX ADMIN — MIDODRINE HYDROCHLORIDE 15 MG: 5 TABLET ORAL at 16:52

## 2024-12-04 RX ADMIN — EPOETIN ALFA-EPBX 20000 UNITS: 20000 INJECTION, SOLUTION INTRAVENOUS; SUBCUTANEOUS at 21:56

## 2024-12-04 RX ADMIN — Medication 10 ML: at 12:47

## 2024-12-04 RX ADMIN — ATORVASTATIN CALCIUM 10 MG: 10 TABLET ORAL at 09:16

## 2024-12-04 RX ADMIN — SILDENAFIL 10 MG: 20 TABLET ORAL at 21:28

## 2024-12-04 RX ADMIN — DULOXETINE HYDROCHLORIDE 20 MG: 20 CAPSULE, DELAYED RELEASE ORAL at 09:15

## 2024-12-04 RX ADMIN — CHOLESTYRAMINE 4 G: 4 POWDER, FOR SUSPENSION ORAL at 12:47

## 2024-12-04 RX ADMIN — HEPARIN SODIUM 20 UNITS/KG/HR: 10000 INJECTION, SOLUTION INTRAVENOUS at 10:54

## 2024-12-04 RX ADMIN — PIPERACILLIN AND TAZOBACTAM 3.38 G: 3; .375 INJECTION, POWDER, FOR SOLUTION INTRAVENOUS at 04:49

## 2024-12-04 RX ADMIN — MIDODRINE HYDROCHLORIDE 15 MG: 5 TABLET ORAL at 09:15

## 2024-12-04 RX ADMIN — TACROLIMUS 1 MG: 1 CAPSULE ORAL at 09:16

## 2024-12-04 RX ADMIN — SILDENAFIL 10 MG: 20 TABLET ORAL at 16:52

## 2024-12-04 RX ADMIN — TACROLIMUS 1 MG: 1 CAPSULE ORAL at 21:29

## 2024-12-04 RX ADMIN — MIDODRINE HYDROCHLORIDE 15 MG: 5 TABLET ORAL at 12:47

## 2024-12-04 RX ADMIN — QUETIAPINE FUMARATE 25 MG: 25 TABLET ORAL at 21:27

## 2024-12-04 RX ADMIN — PREDNISONE 5 MG: 5 TABLET ORAL at 09:15

## 2024-12-04 RX ADMIN — NYSTATIN: 100000 POWDER TOPICAL at 12:47

## 2024-12-04 RX ADMIN — HYDROCODONE BITARTRATE AND ACETAMINOPHEN 1 TABLET: 7.5; 325 TABLET ORAL at 05:38

## 2024-12-04 RX ADMIN — PIPERACILLIN AND TAZOBACTAM 3.38 G: 3; .375 INJECTION, POWDER, FOR SOLUTION INTRAVENOUS at 21:27

## 2024-12-04 RX ADMIN — Medication 10 ML: at 12:48

## 2024-12-04 RX ADMIN — SILDENAFIL 10 MG: 20 TABLET ORAL at 09:15

## 2024-12-04 RX ADMIN — FAMOTIDINE 20 MG: 20 TABLET, FILM COATED ORAL at 09:16

## 2024-12-04 RX ADMIN — Medication 10 ML: at 21:31

## 2024-12-04 RX ADMIN — Medication 12.5 MG: at 09:15

## 2024-12-04 RX ADMIN — Medication 10 ML: at 09:22

## 2024-12-04 RX ADMIN — NYSTATIN: 100000 POWDER TOPICAL at 21:30

## 2024-12-04 RX ADMIN — PIPERACILLIN AND TAZOBACTAM 3.38 G: 3; .375 INJECTION, POWDER, FOR SOLUTION INTRAVENOUS at 12:47

## 2024-12-04 NOTE — PROGRESS NOTES
[facility] MEDICINE SERVICE  DAILY PROGRESS NOTE    NAME: Jaja Carcamo  : 1945  MRN: [id number]      LOS: 4 days     PROVIDER OF SERVICE: Khris Tomas MD    Chief Complaint: Acute deep vein thrombosis (DVT) of other vein of right upper extremity    Subjective:     Interval History:  History taken from: patient    History of Present Illness: Jaja Carcamo is a 79 y.o. female with a CMH of atrial flutter, COPD, previous history of ESRD now with renal transplant, hypertension who presents to the hospital with complaints of right arm swelling and pain.  The patient is a poor historian but was able to tell me that she has an AV fistula on her right arm for previous history of ESRD on dialysis.  However she received a renal transplant a few years ago and has not required hemodialysis since then.  She did notice that over the past few weeks she has had increasing right upper extremity pain and swelling that is extending all the way up to her right shoulder.  She is complaining of some enlargement of veins in her right upper arm and shoulder area.  She also complains of pain in that area.  She denies any shortness of breath, cough, congestion, fevers, chills.  RUE ultrasound in the ER did show extensive DVT of the right internal jugular vein.       seen in bed LINNEA, VSS, DW RN,   examined in bed acute distress, dyspnea on 8 L of oxygen, patient with significant edema left right upper extremity.  Poor access, ordered PICC team for line placement.  Discussed with RN.  24 patient seen in bed no acute distress, dyspnea on 8 L, patient with hypotension.  Will increase midodrine, received fluid bolus.  Will give albumin.  Discussed with RN.  12/3/24 seen in bed NAD, BP 94/56. Wbc 16, underwent:Right arm fistulogram  Central venogram  Balloon angioplasty of right cephalic vein with 7 mm, 10 mm, and 12 mm angioplasty balloons  Balloon angioplasty of right subclavian vein with 7mm, 10mm, and  12mm angioplasty balloons3\  12/4/2024 patient seen and examined in bed acute distress, assessment #2 confusion are normal.  Heart rate 125.  Patient with fatigue weakness, dyspnea on 5 L.  Palliative care consulted.    Review of Systems  10 point ROS note except for above  Objective:     Vital Signs  Temp:  [97.3 °F (36.3 °C)-98 °F (36.7 °C)] 98 °F (36.7 °C)  Heart Rate:  [] 125  Resp:  [12-20] 12  BP: ()/(42-68) 121/59  Flow (L/min) (Oxygen Therapy):  [5] 5   Body mass index is 29.05 kg/m².    Physical Exam  General Appearance:  Alert, cooperative, no distress, appears stated age  Head:   Normocephalic, without obvious abnormality, atraumatic  Eyes:   PERRL, conjunctiva/corneas clear, EOM's intact, fundi benign, both eyes  Ears:    Normal TM's and external ear canals, both ears  Nose:Nares normal, septum midline, mucosa normal, no drainage or sinus tenderness  Throat:Lips, mucosa, and tongue normal; teeth and gums normal  Neck:Supple, symmetrical, trachea midline, no adenopathy, thyroid: not enlarged, symmetric, no tenderness/mass/nodules, no carotid bruit or JVD  Lungs:             Clear to auscultation bilaterally, respirations unlabored  Heart:  Regular rate and rhythm, S1, S2 normal, no murmur, rub or gallop  Abdomen:  Soft, non-tender, bowel sounds active all four quadrants,  no masses, no organomegaly  Extremities:RUE nonpitting edema with nonfunctioning AV fistula, enlargement of right upper arm veins  Pulses:2+ and symmetric  Skin:Skin color, texture, turgor normal, no rashes or lesions  Neurologic: Normal     Diagnostic Data    Results from last 7 days   Lab Units 12/04/24  0429 12/03/24  0051 12/02/24  0018   WBC 10*3/mm3 15.04*   < > 12.86*   HEMOGLOBIN g/dL 7.3*   < > 8.1*   HEMATOCRIT % 25.9*   < > 28.5*   PLATELETS 10*3/mm3 192   < > 200   GLUCOSE mg/dL 127*   < > 81   CREATININE mg/dL 2.33*   < > 1.08*   BUN mg/dL 33*   < > 24*   SODIUM mmol/L 133*   < > 137   POTASSIUM mmol/L 4.6   < >  4.7   AST (SGOT) U/L  --   --  18   ALT (SGPT) U/L  --   --  5   ALK PHOS U/L  --   --  60   BILIRUBIN mg/dL  --   --  0.6   ANION GAP mmol/L 11.2   < > 8.5    < > = values in this interval not displayed.       US Renal Limited    Result Date: 12/3/2024  Impression: 1. Right lower pelvic renal transplant without hydronephrosis. 2. Right lower quadrant ascites. Electronically Signed: Franck Cortez MD  12/3/2024 12:37 PM EST  Workstation ID: [id number]       I reviewed the patient's new clinical results.    Assessment/Plan:     Active and Resolved Problems  Active Hospital Problems    Diagnosis  POA    **Acute deep vein thrombosis (DVT) of other vein of right upper extremity [I82.621]  Yes    Acute DVT (deep venous thrombosis) [I82.409]  Yes      Resolved Hospital Problems   No resolved problems to display.      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  -Nephrology consult     Hypothyroidism  -Continue Synthroid    VTE Prophylaxis:  Pharmacologic VTE prophylaxis orders are present.    Disposition  Planning:   Barriers to Discharge:dvt  Anticipated Date of Discharge: 12/3  Place of Discharge: home      Time: 35 minutes     There are no questions and answers to display.       Signature: Electronically signed by Khris Tomas MD, 12/04/24, 15:59 EST.  [facility]ist Team

## 2024-12-04 NOTE — SIGNIFICANT NOTE
12/04/24 1108   OTHER   Discipline physical therapist   Rehab Time/Intention   Session Not Performed patient unavailable for evaluation  (Medically unstable per RN. Not appropriate today.)   Therapy Assessment/Plan (PT)   Criteria for Skilled Interventions Met (PT) yes;skilled treatment is necessary   Recommendation   PT - Next Appointment 12/05/24

## 2024-12-04 NOTE — PROGRESS NOTES
Daily Progress Note        Acute deep vein thrombosis (DVT) of other vein of right upper extremity    Acute DVT (deep venous thrombosis)    Assessment:     Hypoxic respiratory insufficiency multifactorial   negative respiratory panel     right IJ DVT, brachial artery aneurysmal dilation/AV fistula  No PE on CAT scan  Pulm edema with bilateral pleural effusion left > right  A-fib with RVR was on Eliquis as an outpatient     Pulmonary hypertension  2D echo 10/4/2024  EF 58 % (Biplane Amato's method).   Severe biatrial enlargement.  moderate mitral regurgitation. moderate tricuspid regurgitation.   Calculated pulmonary systolic pressure of 72 mmHg.   Dilated inferior vena cava consistent with a mean right atrial pressure of 15  mmHg.      ? 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 difficile 6/7/2024  History of ESBL E. coli and 6/5/2024  Hypothyroidism  CAD  History of DVT     Pulmonary hypertension  HTN        Recommendations:     Oxygen titration currently on 3 L    Cont Low-dose Revatio 10 mg 3 times daily   Blood pressure decreased midodrine 10 mg 3 times daily started we will continue to monitor if blood pressure does not improve will stop sildenafil  No nitrates     Diuresis by renal  Immunosuppressive agents Prograf for renal transplant monitoring level     Anticoagulation on full dose IV heparin     Empiric antibiotics currently on IV Zosyn    Avoid sedatives           Chest x-ray 12/1/2024                  LOS: 4 days     Subjective         Objective     Vital signs for last 24 hours:  Vitals:    12/03/24 2028 12/03/24 2029 12/04/24 0006 12/04/24 0335   BP: 97/68  91/42 103/63   BP Location: Right arm  Left arm Left arm   Patient Position: Lying  Lying Lying   Pulse: 112 116 96 109   Resp: 20  18 19   Temp: 97.6 °F (36.4 °C)  97.5 °F (36.4 °C) 97.3 °F (36.3 °C)   TempSrc: Oral  Oral Oral   SpO2: (!) 89% 100% 100% 100%   Weight:        Height:           Intake/Output last 3 shifts:  I/O last 3 completed shifts:  In: 5038.5 [P.O.:960; I.V.:4078.5]  Out: -   Intake/Output this shift:  No intake/output data recorded.      Radiology  Imaging Results (Last 24 Hours)       Procedure Component Value Units Date/Time     Renal Limited [[id number]] Collected: 12/03/24 1234     Updated: 12/03/24 1239    Narrative:      US RENAL LIMITED    Date of Exam: 12/3/2024 11:14 AM EST    Indication: TONYA. History of renal transplant    Comparison: CT abdomen and pelvis without contrast 6/5/2024    Technique: Grayscale and color Doppler ultrasound evaluation of the kidneys and urinary bladder was performed.    Findings:  Within the right lower pelvis there is a renal transplant which measures 10.3 x 6.8 x 6.9 cm. No hydronephrosis. Corticomedullary differentiation maintained. There is no debris in the bladder. Right lower quadrant ascites noted.      Impression:      Impression:  1. Right lower pelvic renal transplant without hydronephrosis.  2. Right lower quadrant ascites.        Electronically Signed: Franck Cortez MD    12/3/2024 12:37 PM EST    Workstation ID: [id number]    [facility] ([employer]) [[id number]] Resulted: 12/03/24 0830     Updated: 12/03/24 0830    Narrative:      Please see performing physician's note for result.            Labs:  Results from last 7 days   Lab Units 12/04/24 0429   WBC 10*3/mm3 15.04*   HEMOGLOBIN g/dL 7.3*   HEMATOCRIT % 25.9*   PLATELETS 10*3/mm3 192     Results from last 7 days   Lab Units 12/04/24  0429 12/03/24  0051 12/02/24  0018   SODIUM mmol/L 133*   < > 137   POTASSIUM mmol/L 4.6   < > 4.7   CHLORIDE mmol/L 100   < > 101   CO2 mmol/L 21.8*   < > 27.5   BUN mg/dL 33*   < > 24*   CREATININE mg/dL 2.33*   < > 1.08*   CALCIUM mg/dL 8.8   < > 8.6   BILIRUBIN mg/dL  --   --  0.6   ALK PHOS U/L  --   --  60   ALT (SGPT) U/L  --   --  5   AST (SGOT) U/L  --   --  18   GLUCOSE mg/dL 127*   < > 81    < > = values  in this interval not displayed.     Results from last 7 days   Lab Units 12/01/24  1550   PH, ARTERIAL pH units 7.326*   PO2 ART mm Hg 58.5*   PCO2, ARTERIAL mm Hg 55.4*   HCO3 ART mmol/L 29.0*     Results from last 7 days   Lab Units 12/04/24  0429 12/03/24  0051 12/02/24  0018   ALBUMIN g/dL 3.2* 3.4* 2.7*  2.6*             Results from last 7 days   Lab Units 12/04/24  0429   MAGNESIUM mg/dL 1.8     Results from last 7 days   Lab Units 12/04/24  0429 12/03/24  2113 12/03/24  1326 12/02/24  0747 12/02/24  0018 12/01/24  1457 12/01/24  0557 11/30/24  0705 11/30/24  0027   INR   --   --   --   --  1.76*  --  1.85*  --  1.91*   APTT seconds 82.1* 65.9* 37.5*   < > 90.0*   < > 56.5*   < > 47.5*    < > = values in this interval not displayed.     Results from last 7 days   Lab Units 11/29/24  1307   TSH uIU/mL 17.200*           Meds:   SCHEDULE  atorvastatin, 10 mg, Oral, Daily  cholestyramine light, 1 packet, Oral, Q24H  DULoxetine, 20 mg, Oral, Daily  epoetin rylee/rylee-epbx, 20,000 Units, Subcutaneous, Weekly  famotidine, 20 mg, Oral, Daily  ferric gluconate, 125 mg, Intravenous, Daily  hydrOXYzine, 25 mg, Oral, Nightly  levothyroxine, 50 mcg, Oral, Q AM  metoprolol tartrate, 12.5 mg, Oral, Q12H  midodrine, 15 mg, Oral, TID AC  mirtazapine, 15 mg, Oral, Nightly  nystatin, , Topical, Q12H  piperacillin-tazobactam, 3.375 g, Intravenous, Q8H  predniSONE, 5 mg, Oral, Daily With Breakfast  QUEtiapine, 25 mg, Oral, Nightly  sildenafil, 10 mg, Oral, TID  sodium chloride, 10 mL, Intravenous, Q12H  tacrolimus, 1 mg, Oral, BID      Infusions  heparin, 18 Units/kg/hr, Last Rate: 20 Units/kg/hr (12/03/24 1390)      PRNs    acetaminophen    senna-docusate sodium **AND** polyethylene glycol **AND** bisacodyl **AND** bisacodyl    diazePAM    HYDROcodone-acetaminophen    influenza vaccine    LORazepam    ondansetron    prochlorperazine    [COMPLETED] Insert Peripheral IV **AND** sodium chloride    sodium chloride    sodium  chloride    Physical Exam:  Physical Exam  Cardiovascular:      Heart sounds: Murmur heard.      No gallop.   Pulmonary:      Effort: No respiratory distress.      Breath sounds: No stridor. Rhonchi and rales present. No wheezing.   Chest:      Chest wall: No tenderness.         ROS  Review of Systems   Respiratory:  Positive for cough and shortness of breath. Negative for wheezing and stridor.    Cardiovascular:  Positive for chest pain, palpitations and leg swelling.             Total time spent with patient greater than: 45 Minutes

## 2024-12-04 NOTE — PROGRESS NOTES
"RENAL/KCC:     LOS: 4 days    Patient Care Team:  Kvng Guillen MD as PCP - General (Family Medicine)  Jak Gavin MD as Cardiologist (Cardiology)    Chief Complaint:  TONYA/CKD, Kidney transplant    Subjective     Interval History:   Chart reviewed  Oliguric    Objective     Vital Sign Min/Max for last 24 hours  Temp  Min: 97.3 °F (36.3 °C)  Max: 98.1 °F (36.7 °C)   BP  Min: 91/42  Max: 128/64   Pulse  Min: 96  Max: 117   Resp  Min: 10  Max: 22   SpO2  Min: 86 %  Max: 100 %   Flow (L/min) (Oxygen Therapy)  Min: 3  Max: 10   Weight  Min: 81.6 kg (180 lb)  Max: 81.6 kg (180 lb)     Flowsheet Rows      Flowsheet Row First Filed Value   Admission Height 167.6 cm (66\") Documented at 11/29/2024 1238   Admission Weight 74.5 kg (164 lb 3.9 oz) Documented at 11/29/2024 1238            No intake/output data recorded.  I/O last 3 completed shifts:  In: 5038.5 [P.O.:960; I.V.:4078.5]  Out: -     Physical Exam:  GEN: Awake, NAD  ENT: PERRL, EOMI, MMM  NECK: Supple, no JVD  CHEST: CTAB, no W/R/C  CV: RRR, no M/G/R  ABD: Soft, NT, +BS  SKIN: Warm and Dry  NEURO: CN's intact      WBC WBC   Date Value Ref Range Status   12/04/2024 15.04 (H) 3.40 - 10.80 10*3/mm3 Final   12/03/2024 16.20 (H) 3.40 - 10.80 10*3/mm3 Final   12/02/2024 12.86 (H) 3.40 - 10.80 10*3/mm3 Final   12/01/2024 8.63 3.40 - 10.80 10*3/mm3 Final      HGB Hemoglobin   Date Value Ref Range Status   12/04/2024 7.3 (L) 12.0 - 15.9 g/dL Final   12/03/2024 7.1 (L) 12.0 - 15.9 g/dL Final   12/02/2024 8.1 (L) 12.0 - 15.9 g/dL Final   12/01/2024 8.0 (L) 12.0 - 15.9 g/dL Final      HCT Hematocrit   Date Value Ref Range Status   12/04/2024 25.9 (L) 34.0 - 46.6 % Final   12/03/2024 25.0 (L) 34.0 - 46.6 % Final   12/02/2024 28.5 (L) 34.0 - 46.6 % Final   12/01/2024 28.4 (L) 34.0 - 46.6 % Final      Platlets No results found for: \"LABPLAT\"   MCV MCV   Date Value Ref Range Status   12/04/2024 91.8 79.0 - 97.0 fL Final   12/03/2024 93.3 79.0 - 97.0 fL Final   12/02/2024 92.2 " "79.0 - 97.0 fL Final   12/01/2024 94.0 79.0 - 97.0 fL Final          Sodium Sodium   Date Value Ref Range Status   12/04/2024 133 (L) 136 - 145 mmol/L Final   12/03/2024 135 (L) 136 - 145 mmol/L Final   12/02/2024 137 136 - 145 mmol/L Final   12/01/2024 136 136 - 145 mmol/L Final      Potassium Potassium   Date Value Ref Range Status   12/04/2024 4.6 3.5 - 5.2 mmol/L Final   12/03/2024 5.0 3.5 - 5.2 mmol/L Final     Comment:     Specimen hemolyzed.  Result may be falsely elevated.   12/02/2024 4.7 3.5 - 5.2 mmol/L Final     Comment:     Specimen hemolyzed.  Result may be falsely elevated.   12/01/2024 4.8 3.5 - 5.2 mmol/L Final      Chloride Chloride   Date Value Ref Range Status   12/04/2024 100 98 - 107 mmol/L Final   12/03/2024 100 98 - 107 mmol/L Final   12/02/2024 101 98 - 107 mmol/L Final   12/01/2024 100 98 - 107 mmol/L Final      CO2 CO2   Date Value Ref Range Status   12/04/2024 21.8 (L) 22.0 - 29.0 mmol/L Final   12/03/2024 22.5 22.0 - 29.0 mmol/L Final   12/02/2024 27.5 22.0 - 29.0 mmol/L Final   12/01/2024 26.4 22.0 - 29.0 mmol/L Final      BUN BUN   Date Value Ref Range Status   12/04/2024 33 (H) 8 - 23 mg/dL Final   12/03/2024 30 (H) 8 - 23 mg/dL Final   12/02/2024 24 (H) 8 - 23 mg/dL Final   12/01/2024 21 8 - 23 mg/dL Final      Creatinine Creatinine   Date Value Ref Range Status   12/04/2024 2.33 (H) 0.57 - 1.00 mg/dL Final   12/03/2024 1.71 (H) 0.57 - 1.00 mg/dL Final   12/02/2024 1.08 (H) 0.57 - 1.00 mg/dL Final   12/01/2024 0.87 0.57 - 1.00 mg/dL Final      Calcium Calcium   Date Value Ref Range Status   12/04/2024 8.8 8.6 - 10.5 mg/dL Final   12/03/2024 8.8 8.6 - 10.5 mg/dL Final   12/02/2024 8.6 8.6 - 10.5 mg/dL Final   12/01/2024 8.9 8.6 - 10.5 mg/dL Final      PO4 No results found for: \"CAPO4\"   Albumin Albumin   Date Value Ref Range Status   12/04/2024 3.2 (L) 3.5 - 5.2 g/dL Final   12/03/2024 3.4 (L) 3.5 - 5.2 g/dL Final   12/02/2024 2.6 (L) 3.5 - 5.2 g/dL Final   12/02/2024 2.7 (L) 3.5 - " "5.2 g/dL Final      Magnesium Magnesium   Date Value Ref Range Status   12/04/2024 1.8 1.6 - 2.4 mg/dL Final   12/03/2024 1.9 1.6 - 2.4 mg/dL Final   12/02/2024 1.8 1.6 - 2.4 mg/dL Final   12/01/2024 1.9 1.6 - 2.4 mg/dL Final      Uric Acid No results found for: \"URICACID\"        Results Review:     I reviewed the patient's new clinical results.    atorvastatin, 10 mg, Oral, Daily  cholestyramine light, 1 packet, Oral, Q24H  DULoxetine, 20 mg, Oral, Daily  famotidine, 20 mg, Oral, Daily  hydrOXYzine, 25 mg, Oral, Nightly  levothyroxine, 50 mcg, Oral, Q AM  metoprolol tartrate, 12.5 mg, Oral, Q12H  midodrine, 15 mg, Oral, TID AC  mirtazapine, 15 mg, Oral, Nightly  nystatin, , Topical, Q12H  piperacillin-tazobactam, 3.375 g, Intravenous, Q8H  predniSONE, 5 mg, Oral, Daily With Breakfast  QUEtiapine, 25 mg, Oral, Nightly  sildenafil, 10 mg, Oral, TID  sodium chloride, 10 mL, Intravenous, Q12H  tacrolimus, 1 mg, Oral, BID      heparin, 18 Units/kg/hr, Last Rate: 20 Units/kg/hr (12/03/24 2140)        Medication Review: Reviewed    Assessment & Plan     1) Kidney Transplant  2) TONYA - Cr bumped to 2.3  3) DVT  4) Hypotension  5) Anemia    Plan: Cr up to 2.3 = ATN related to hypotension, reduced renal perfusion.  Lytes OK.  Midodrine for BP support.  Lasix IV x 1 and monitor for any response.  Transplant US with no hydro.  Will check doppler or renal artery and vein.  On AC and Vascular following.  S/P fistulogram.  Hgb 7.3.  Replete iron.  ABX per primary.  Pulm following as well.  Palliative consult pending.       Naun Falcon MD  [facility]  12/04/24  05:56 EST      "

## 2024-12-04 NOTE — PROGRESS NOTES
Name: Jaja Carcamo ADMIT: 2024   : 1945  PCP: Kvng Guillen MD    MRN: [id number] LOS: 4 days   AGE/SEX: 79 y.o. female  ROOM:  [facility] 272/A     CC: Right arm swelling, IJ DVT; s/p right arm fistulogram, central venogram, balloon angioplasty of the right cephalic and subclavian veins on 12/3    Interval History: Patient resting in bed.  Reports soreness to her right arm.    Subjective   Subjective     Review of Systems  Objective   Objective     Vitals:   Temp:  [97.3 °F (36.3 °C)-98.1 °F (36.7 °C)] 97.3 °F (36.3 °C)  Heart Rate:  [] 109  Resp:  [10-22] 19  BP: ()/(42-73) 103/63    No intake/output data recorded.    Scheduled Meds:     atorvastatin, 10 mg, Oral, Daily  cholestyramine light, 1 packet, Oral, Q24H  DULoxetine, 20 mg, Oral, Daily  epoetin rylee/rylee-epbx, 20,000 Units, Subcutaneous, Weekly  famotidine, 20 mg, Oral, Daily  ferric gluconate, 125 mg, Intravenous, Daily  hydrOXYzine, 25 mg, Oral, Nightly  levothyroxine, 50 mcg, Oral, Q AM  metoprolol tartrate, 12.5 mg, Oral, Q12H  midodrine, 15 mg, Oral, TID AC  mirtazapine, 15 mg, Oral, Nightly  nystatin, , Topical, Q12H  piperacillin-tazobactam, 3.375 g, Intravenous, Q8H  predniSONE, 5 mg, Oral, Daily With Breakfast  QUEtiapine, 25 mg, Oral, Nightly  sildenafil, 10 mg, Oral, TID  sodium chloride, 10 mL, Intravenous, Q12H  tacrolimus, 1 mg, Oral, BID      IV Meds:   heparin, 18 Units/kg/hr, Last Rate: 20 Units/kg/hr (240)        Physical Exam  Right arm swollen, improving  Thrill in right arm fistula.     Data Reviewed:  CBC    Results from last 7 days   Lab Units 24  0429 24  0051 24  0018 24  0557 24  0027 24  1307   WBC 10*3/mm3 15.04* 16.20* 12.86* 8.63 12.15* 17.75*   HEMOGLOBIN g/dL 7.3* 7.1* 8.1* 8.0* 7.7* 8.9*   PLATELETS 10*3/mm3 192 183 200 185 158 169     BMP   Results from last 7 days   Lab Units 24  0429 24  0051 24  0018 24  0557  "11/30/24  0027 11/29/24  1307   SODIUM mmol/L 133* 135* 137 136 136 137   POTASSIUM mmol/L 4.6 5.0 4.7 4.8 4.2 4.9   CHLORIDE mmol/L 100 100 101 100 101 100   CO2 mmol/L 21.8* 22.5 27.5 26.4 27.8 29.1*   BUN mg/dL 33* 30* 24* 21 16 13   CREATININE mg/dL 2.33* 1.71* 1.08* 0.87 0.73 0.71   GLUCOSE mg/dL 127* 96 81 162* 120* 102*   MAGNESIUM mg/dL 1.8 1.9 1.8 1.9 1.9 1.9   PHOSPHORUS mg/dL 4.2 4.5 4.1  --   --   --      Cr Clearance Estimated Creatinine Clearance: 21.8 mL/min (A) (by C-G formula based on SCr of 2.33 mg/dL (H)).  Coag   Results from last 7 days   Lab Units 12/04/24  0429 12/03/24  2113 12/03/24  1326 12/03/24  0051 12/02/24  1728 12/02/24  0747 12/02/24  0018 12/01/24  1457 12/01/24  0557 11/30/24  0705 11/30/24  0027 11/29/24  1710 11/29/24  1307   INR   --   --   --   --   --   --  1.76*  --  1.85*  --  1.91*  --  2.22*   APTT seconds 82.1* 65.9* 37.5* 34.7 34.4 122.0* 90.0*   < > 56.5*   < > 47.5*   < > 36.2*    < > = values in this interval not displayed.     HbA1C No results found for: \"HGBA1C\"  Blood Glucose No results found for: \"POCGLU\"  Infection   Results from last 7 days   Lab Units 12/02/24  0401 12/02/24  0018   BLOODCX  No growth at 2 days No growth at 2 days     CMP   Results from last 7 days   Lab Units 12/04/24  0429 12/03/24  0051 12/02/24  0018 12/01/24  0557 11/30/24  0027 11/29/24  1307   SODIUM mmol/L 133* 135* 137 136 136 137   POTASSIUM mmol/L 4.6 5.0 4.7 4.8 4.2 4.9   CHLORIDE mmol/L 100 100 101 100 101 100   CO2 mmol/L 21.8* 22.5 27.5 26.4 27.8 29.1*   BUN mg/dL 33* 30* 24* 21 16 13   CREATININE mg/dL 2.33* 1.71* 1.08* 0.87 0.73 0.71   GLUCOSE mg/dL 127* 96 81 162* 120* 102*   ALBUMIN g/dL 3.2* 3.4* 2.7*  2.6*  --   --   --    BILIRUBIN mg/dL  --   --  0.6  --   --   --    ALK PHOS U/L  --   --  60  --   --   --    AST (SGOT) U/L  --   --  18  --   --   --    ALT (SGPT) U/L  --   --  5  --   --   --      ABG    Results from last 7 days   Lab Units 12/01/24  1550   PH, ARTERIAL " "pH units 7.326*   PCO2, ARTERIAL mm Hg 55.4*   PO2 ART mm Hg 58.5*   O2 SATURATION ART % 87.2*   BASE EXCESS ART mmol/L 2.0     UA      JV  No results found for: \"POCMETH\", \"POCAMPHET\", \"POCBARBITUR\", \"POCBENZO\", \"POCCOCAINE\", \"POCOPIATES\", \"POCOXYCODO\", \"POCPHENCYC\", \"POCPROPOXY\", \"POCTHC\", \"POCTRICYC\"  Lysis Labs   Results from last 7 days   Lab Units 12/04/24  0429 12/03/24  2113 12/03/24  1326 12/03/24  0051 12/02/24  1728 12/02/24  0747 12/02/24  0018 12/01/24  1457 12/01/24  0557 11/30/24  0705 11/30/24  0027 11/29/24  1710 11/29/24  1307   INR   --   --   --   --   --   --  1.76*  --  1.85*  --  1.91*  --  2.22*   APTT seconds 82.1* 65.9* 37.5* 34.7 34.4 122.0* 90.0*   < > 56.5*   < > 47.5*   < > 36.2*   HEMOGLOBIN g/dL 7.3*  --   --  7.1*  --   --  8.1*  --  8.0*  --  7.7*  --  8.9*   PLATELETS 10*3/mm3 192  --   --  183  --   --  200  --  185  --  158  --  169   CREATININE mg/dL 2.33*  --   --  1.71*  --   --  1.08*  --  0.87  --  0.73  --  0.71    < > = values in this interval not displayed.     Radiology(recent) US Renal Limited    Result Date: 12/3/2024  Impression: 1. Right lower pelvic renal transplant without hydronephrosis. 2. Right lower quadrant ascites. Electronically Signed: Franck Cortez MD  12/3/2024 12:37 PM EST  Workstation ID: [id number]     Most notable findings include: Hgb 8.0, INR 1.8, no leukocytosis.     Active Hospital Problems:   Active Hospital Problems    Diagnosis  POA    **Acute deep vein thrombosis (DVT) of other vein of right upper extremity [I82.621]  Yes    Acute DVT (deep venous thrombosis) [I82.409]  Yes      Resolved Hospital Problems   No resolved problems to display.      Assessment & Plan     Assessment / Plan     Jaja Carcamo is a 79 y.o. female with right arm swelling and right internal jugular vein DVT.  Patient has a right arm fistula on that side that has not been used in 8 years.  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  I think " this could likely be successfully managed with a fistulogram with possible intervention.    12/3/2021 -  right arm fistulogram, central venogram, balloon angioplasty of the right cephalic and subclavian veins by Dr Alva    POD1  VSS  Still with some right arm edema, improving, continue gentle ace wrap  On heparin gtt for her atrial flutter, okay to transition to oral AC from our standpoint  Bolster stitch removed at bedside today without issue  Stable from a vascular surgery standpoint  Follow up as an outpatient in 1 month  We will sign off    Lisa Wolf, APRN  12/04/24  [facility] [place] [facility]  [phone]

## 2024-12-04 NOTE — NURSING NOTE
When the nurse went to go into the patients room to turn her the patient told the nurse he was not supposed to be in there. Patient seemed to be confused upon being woken up. When turning the patient the patient told the nurse not to look at her bottom. The nurse placed the pillow under the patient and did not perform a skin assessment at that time.  Patient later told the nursing assistant that she no longer wants the nurse to care for her because she believes the nurse to be palma. Patient is currently refusing male staff. Patient was assigned a female nurse at 0030.

## 2024-12-04 NOTE — CONSULTS
Palliative Care Social Work Progress Note    Code Status:not specified    Goals of Care: Full Treatment    Narrative: Palliative care  contacted patients son to discuss goals of care. He shared patient has been in rehab since early July and  is hopeful she will be able to return home soon. He states the current goal is to continue treatment and have patient return back to the [facility] for additional therapy. He is agreeable to pallitus for extra support at the facility and at home. Code status discussed as there is not one on file. He states he is leaning towards a DNR but would like to discuss code status with her first. Palliative care will continue to follow and offer support pending clinical course.     Plan: Pallitus referral          Emily England

## 2024-12-04 NOTE — PLAN OF CARE
Goal Outcome Evaluation:  Plan of Care Reviewed With: patient           Outcome Evaluation: Pt up on and off throughout the night, c/o back pain PRN pain medication given. Pt remains on 5L HFNC. Heparin gtt remains at this time, next draw @ 1030. Palliative consult this AM, will continue to monitor.

## 2024-12-05 LAB
ALBUMIN SERPL-MCNC: 3 G/DL (ref 3.5–5.2)
ANION GAP SERPL CALCULATED.3IONS-SCNC: 11.5 MMOL/L (ref 5–15)
APTT PPP: 113.5 SECONDS (ref 22.7–35.4)
APTT PPP: 172.8 SECONDS (ref 22.7–35.4)
BASOPHILS # BLD AUTO: 0.03 10*3/MM3 (ref 0–0.2)
BASOPHILS NFR BLD AUTO: 0.2 % (ref 0–1.5)
BUN SERPL-MCNC: 37 MG/DL (ref 8–23)
BUN/CREAT SERPL: 12.3 (ref 7–25)
CALCIUM SPEC-SCNC: 9.2 MG/DL (ref 8.6–10.5)
CHLORIDE SERPL-SCNC: 101 MMOL/L (ref 98–107)
CO2 SERPL-SCNC: 20.5 MMOL/L (ref 22–29)
CREAT SERPL-MCNC: 3.01 MG/DL (ref 0.57–1)
DEPRECATED RDW RBC AUTO: 61.3 FL (ref 37–54)
EGFRCR SERPLBLD CKD-EPI 2021: 15.5 ML/MIN/1.73
EOSINOPHIL # BLD AUTO: 0.01 10*3/MM3 (ref 0–0.4)
EOSINOPHIL NFR BLD AUTO: 0.1 % (ref 0.3–6.2)
ERYTHROCYTE [DISTWIDTH] IN BLOOD BY AUTOMATED COUNT: 18.6 % (ref 12.3–15.4)
GLUCOSE SERPL-MCNC: 105 MG/DL (ref 65–99)
HCT VFR BLD AUTO: 27.8 % (ref 34–46.6)
HGB BLD-MCNC: 7.9 G/DL (ref 12–15.9)
IMM GRANULOCYTES # BLD AUTO: 0.3 10*3/MM3 (ref 0–0.05)
IMM GRANULOCYTES NFR BLD AUTO: 2 % (ref 0–0.5)
LYMPHOCYTES # BLD AUTO: 1.57 10*3/MM3 (ref 0.7–3.1)
LYMPHOCYTES NFR BLD AUTO: 10.7 % (ref 19.6–45.3)
MAGNESIUM SERPL-MCNC: 1.9 MG/DL (ref 1.6–2.4)
MCH RBC QN AUTO: 26.1 PG (ref 26.6–33)
MCHC RBC AUTO-ENTMCNC: 28.4 G/DL (ref 31.5–35.7)
MCV RBC AUTO: 91.7 FL (ref 79–97)
MONOCYTES # BLD AUTO: 0.82 10*3/MM3 (ref 0.1–0.9)
MONOCYTES NFR BLD AUTO: 5.6 % (ref 5–12)
NEUTROPHILS NFR BLD AUTO: 11.96 10*3/MM3 (ref 1.7–7)
NEUTROPHILS NFR BLD AUTO: 81.4 % (ref 42.7–76)
NRBC BLD AUTO-RTO: 0.9 /100 WBC (ref 0–0.2)
PHOSPHATE SERPL-MCNC: 5.2 MG/DL (ref 2.5–4.5)
PLATELET # BLD AUTO: 177 10*3/MM3 (ref 140–450)
PMV BLD AUTO: 9.2 FL (ref 6–12)
POTASSIUM SERPL-SCNC: 5.1 MMOL/L (ref 3.5–5.2)
RBC # BLD AUTO: 3.03 10*6/MM3 (ref 3.77–5.28)
SODIUM SERPL-SCNC: 133 MMOL/L (ref 136–145)
WBC NRBC COR # BLD AUTO: 14.69 10*3/MM3 (ref 3.4–10.8)

## 2024-12-05 PROCEDURE — 85730 THROMBOPLASTIN TIME PARTIAL: CPT | Performed by: STUDENT IN AN ORGANIZED HEALTH CARE EDUCATION/TRAINING PROGRAM

## 2024-12-05 PROCEDURE — 97530 THERAPEUTIC ACTIVITIES: CPT

## 2024-12-05 PROCEDURE — 63710000001 TACROLIMUS PER 1 MG: Performed by: STUDENT IN AN ORGANIZED HEALTH CARE EDUCATION/TRAINING PROGRAM

## 2024-12-05 PROCEDURE — 80069 RENAL FUNCTION PANEL: CPT | Performed by: STUDENT IN AN ORGANIZED HEALTH CARE EDUCATION/TRAINING PROGRAM

## 2024-12-05 PROCEDURE — 25010000002 ONDANSETRON PER 1 MG: Performed by: STUDENT IN AN ORGANIZED HEALTH CARE EDUCATION/TRAINING PROGRAM

## 2024-12-05 PROCEDURE — 97162 PT EVAL MOD COMPLEX 30 MIN: CPT

## 2024-12-05 PROCEDURE — 63710000001 PREDNISONE PER 5 MG: Performed by: STUDENT IN AN ORGANIZED HEALTH CARE EDUCATION/TRAINING PROGRAM

## 2024-12-05 PROCEDURE — 83735 ASSAY OF MAGNESIUM: CPT | Performed by: INTERNAL MEDICINE

## 2024-12-05 PROCEDURE — 85025 COMPLETE CBC W/AUTO DIFF WBC: CPT | Performed by: INTERNAL MEDICINE

## 2024-12-05 PROCEDURE — 25010000002 HEPARIN (PORCINE) 25000-0.45 UT/250ML-% SOLUTION: Performed by: STUDENT IN AN ORGANIZED HEALTH CARE EDUCATION/TRAINING PROGRAM

## 2024-12-05 PROCEDURE — 25010000002 PIPERACILLIN SOD-TAZOBACTAM PER 1 G: Performed by: STUDENT IN AN ORGANIZED HEALTH CARE EDUCATION/TRAINING PROGRAM

## 2024-12-05 PROCEDURE — 25010000002 NA FERRIC GLUC CPLX PER 12.5 MG: Performed by: INTERNAL MEDICINE

## 2024-12-05 RX ORDER — FUROSEMIDE 40 MG/1
40 TABLET ORAL
Status: COMPLETED | OUTPATIENT
Start: 2024-12-05 | End: 2024-12-05

## 2024-12-05 RX ORDER — DOXYCYCLINE 100 MG/1
100 CAPSULE ORAL EVERY 12 HOURS SCHEDULED
Status: COMPLETED | OUTPATIENT
Start: 2024-12-05 | End: 2024-12-09

## 2024-12-05 RX ADMIN — DOXYCYCLINE 100 MG: 100 CAPSULE ORAL at 20:40

## 2024-12-05 RX ADMIN — ONDANSETRON 4 MG: 2 INJECTION INTRAMUSCULAR; INTRAVENOUS at 16:19

## 2024-12-05 RX ADMIN — Medication 10 ML: at 09:36

## 2024-12-05 RX ADMIN — DOXYCYCLINE 100 MG: 100 CAPSULE ORAL at 16:02

## 2024-12-05 RX ADMIN — SODIUM CHLORIDE 125 MG: 9 INJECTION, SOLUTION INTRAVENOUS at 09:35

## 2024-12-05 RX ADMIN — Medication 12.5 MG: at 09:35

## 2024-12-05 RX ADMIN — SILDENAFIL 10 MG: 20 TABLET ORAL at 20:34

## 2024-12-05 RX ADMIN — HYDROCODONE BITARTRATE AND ACETAMINOPHEN 1 TABLET: 7.5; 325 TABLET ORAL at 10:57

## 2024-12-05 RX ADMIN — LEVOTHYROXINE SODIUM 50 MCG: 50 TABLET ORAL at 09:36

## 2024-12-05 RX ADMIN — FUROSEMIDE 40 MG: 40 TABLET ORAL at 17:41

## 2024-12-05 RX ADMIN — FUROSEMIDE 40 MG: 40 TABLET ORAL at 10:57

## 2024-12-05 RX ADMIN — HYDROXYZINE HYDROCHLORIDE 25 MG: 25 TABLET ORAL at 20:34

## 2024-12-05 RX ADMIN — Medication 10 ML: at 20:35

## 2024-12-05 RX ADMIN — HEPARIN SODIUM 20 UNITS/KG/HR: 10000 INJECTION, SOLUTION INTRAVENOUS at 04:23

## 2024-12-05 RX ADMIN — MIDODRINE HYDROCHLORIDE 15 MG: 5 TABLET ORAL at 09:35

## 2024-12-05 RX ADMIN — QUETIAPINE FUMARATE 25 MG: 25 TABLET ORAL at 20:34

## 2024-12-05 RX ADMIN — MIDODRINE HYDROCHLORIDE 15 MG: 5 TABLET ORAL at 17:41

## 2024-12-05 RX ADMIN — TACROLIMUS 1 MG: 1 CAPSULE ORAL at 20:34

## 2024-12-05 RX ADMIN — MIRTAZAPINE 15 MG: 15 TABLET, FILM COATED ORAL at 20:34

## 2024-12-05 RX ADMIN — FAMOTIDINE 20 MG: 20 TABLET, FILM COATED ORAL at 09:36

## 2024-12-05 RX ADMIN — Medication 12.5 MG: at 20:34

## 2024-12-05 RX ADMIN — NYSTATIN: 100000 POWDER TOPICAL at 09:36

## 2024-12-05 RX ADMIN — HYDROCODONE BITARTRATE AND ACETAMINOPHEN 1 TABLET: 7.5; 325 TABLET ORAL at 01:45

## 2024-12-05 RX ADMIN — NYSTATIN: 100000 POWDER TOPICAL at 20:37

## 2024-12-05 RX ADMIN — APIXABAN 5 MG: 5 TABLET, FILM COATED ORAL at 20:41

## 2024-12-05 RX ADMIN — HYDROCODONE BITARTRATE AND ACETAMINOPHEN 1 TABLET: 7.5; 325 TABLET ORAL at 20:34

## 2024-12-05 RX ADMIN — APIXABAN 5 MG: 5 TABLET, FILM COATED ORAL at 16:02

## 2024-12-05 RX ADMIN — DULOXETINE HYDROCHLORIDE 20 MG: 20 CAPSULE, DELAYED RELEASE ORAL at 09:35

## 2024-12-05 RX ADMIN — PREDNISONE 5 MG: 5 TABLET ORAL at 09:35

## 2024-12-05 RX ADMIN — SILDENAFIL 10 MG: 20 TABLET ORAL at 16:19

## 2024-12-05 RX ADMIN — ATORVASTATIN CALCIUM 10 MG: 10 TABLET ORAL at 09:35

## 2024-12-05 RX ADMIN — SILDENAFIL 10 MG: 20 TABLET ORAL at 09:35

## 2024-12-05 RX ADMIN — CHOLESTYRAMINE 4 G: 4 POWDER, FOR SUSPENSION ORAL at 09:36

## 2024-12-05 RX ADMIN — TACROLIMUS 1 MG: 1 CAPSULE ORAL at 09:35

## 2024-12-05 RX ADMIN — PIPERACILLIN AND TAZOBACTAM 3.38 G: 3; .375 INJECTION, POWDER, FOR SOLUTION INTRAVENOUS at 04:23

## 2024-12-05 NOTE — PLAN OF CARE
Goal Outcome Evaluation:         Msg Dr. Falcon to let him know that pt has had no urine output today or yesterday. Awaiting response.    Pt still weeping continuously.    Pt off Heparin , placed on PO eliquis

## 2024-12-05 NOTE — PROGRESS NOTES
"RENAL/KCC:     LOS: 5 days    Patient Care Team:  Kvng Guillen MD as PCP - General (Family Medicine)  Jak Gavin MD as Cardiologist (Cardiology)    Chief Complaint:  TONYA/CKD, Kidney transplant    Subjective     Interval History:   Chart reviewed  Oliguric    Objective     Vital Sign Min/Max for last 24 hours  Temp  Min: 96 °F (35.6 °C)  Max: 98 °F (36.7 °C)   BP  Min: 117/63  Max: 121/59   Pulse  Min: 113  Max: 125   Resp  Min: 12  Max: 15   SpO2  Min: 93 %  Max: 100 %   Flow (L/min) (Oxygen Therapy)  Min: 5  Max: 5   No data recorded     Flowsheet Rows      Flowsheet Row First Filed Value   Admission Height 167.6 cm (66\") Documented at 11/29/2024 1238   Admission Weight 74.5 kg (164 lb 3.9 oz) Documented at 11/29/2024 1238            No intake/output data recorded.  I/O last 3 completed shifts:  In: 120 [P.O.:120]  Out: 200 [Urine:200]    Physical Exam:  GEN: Awake, NAD  ENT: PERRL, EOMI, MMM  NECK: Supple, no JVD  CHEST: CTAB, no W/R/C  CV: RRR, no M/G/R  ABD: Soft, NT, +BS  SKIN: Warm and Dry  NEURO: CN's intact      WBC WBC   Date Value Ref Range Status   12/05/2024 14.69 (H) 3.40 - 10.80 10*3/mm3 Final   12/04/2024 15.04 (H) 3.40 - 10.80 10*3/mm3 Final   12/03/2024 16.20 (H) 3.40 - 10.80 10*3/mm3 Final      HGB Hemoglobin   Date Value Ref Range Status   12/05/2024 7.9 (L) 12.0 - 15.9 g/dL Final   12/04/2024 7.3 (L) 12.0 - 15.9 g/dL Final   12/03/2024 7.1 (L) 12.0 - 15.9 g/dL Final      HCT Hematocrit   Date Value Ref Range Status   12/05/2024 27.8 (L) 34.0 - 46.6 % Final   12/04/2024 25.9 (L) 34.0 - 46.6 % Final   12/03/2024 25.0 (L) 34.0 - 46.6 % Final      Platlets No results found for: \"LABPLAT\"   MCV MCV   Date Value Ref Range Status   12/05/2024 91.7 79.0 - 97.0 fL Final   12/04/2024 91.8 79.0 - 97.0 fL Final   12/03/2024 93.3 79.0 - 97.0 fL Final          Sodium Sodium   Date Value Ref Range Status   12/05/2024 133 (L) 136 - 145 mmol/L Final   12/04/2024 133 (L) 136 - 145 mmol/L Final   12/03/2024 " "135 (L) 136 - 145 mmol/L Final      Potassium Potassium   Date Value Ref Range Status   12/05/2024 5.1 3.5 - 5.2 mmol/L Final   12/04/2024 4.6 3.5 - 5.2 mmol/L Final   12/03/2024 5.0 3.5 - 5.2 mmol/L Final     Comment:     Specimen hemolyzed.  Result may be falsely elevated.      Chloride Chloride   Date Value Ref Range Status   12/05/2024 101 98 - 107 mmol/L Final   12/04/2024 100 98 - 107 mmol/L Final   12/03/2024 100 98 - 107 mmol/L Final      CO2 CO2   Date Value Ref Range Status   12/05/2024 20.5 (L) 22.0 - 29.0 mmol/L Final   12/04/2024 21.8 (L) 22.0 - 29.0 mmol/L Final   12/03/2024 22.5 22.0 - 29.0 mmol/L Final      BUN BUN   Date Value Ref Range Status   12/05/2024 37 (H) 8 - 23 mg/dL Final   12/04/2024 33 (H) 8 - 23 mg/dL Final   12/03/2024 30 (H) 8 - 23 mg/dL Final      Creatinine Creatinine   Date Value Ref Range Status   12/05/2024 3.01 (H) 0.57 - 1.00 mg/dL Final   12/04/2024 2.33 (H) 0.57 - 1.00 mg/dL Final   12/03/2024 1.71 (H) 0.57 - 1.00 mg/dL Final      Calcium Calcium   Date Value Ref Range Status   12/05/2024 9.2 8.6 - 10.5 mg/dL Final   12/04/2024 8.8 8.6 - 10.5 mg/dL Final   12/03/2024 8.8 8.6 - 10.5 mg/dL Final      PO4 No results found for: \"CAPO4\"   Albumin Albumin   Date Value Ref Range Status   12/05/2024 3.0 (L) 3.5 - 5.2 g/dL Final   12/04/2024 3.2 (L) 3.5 - 5.2 g/dL Final   12/03/2024 3.4 (L) 3.5 - 5.2 g/dL Final      Magnesium Magnesium   Date Value Ref Range Status   12/05/2024 1.9 1.6 - 2.4 mg/dL Final   12/04/2024 1.8 1.6 - 2.4 mg/dL Final   12/03/2024 1.9 1.6 - 2.4 mg/dL Final      Uric Acid No results found for: \"URICACID\"        Results Review:     I reviewed the patient's new clinical results.    atorvastatin, 10 mg, Oral, Daily  cholestyramine light, 1 packet, Oral, Q24H  DULoxetine, 20 mg, Oral, Daily  epoetin rylee/rylee-epbx, 20,000 Units, Subcutaneous, Weekly  famotidine, 20 mg, Oral, Daily  ferric gluconate, 125 mg, Intravenous, Daily  hydrOXYzine, 25 mg, Oral, " Nightly  levothyroxine, 50 mcg, Oral, Q AM  metoprolol tartrate, 12.5 mg, Oral, Q12H  midodrine, 15 mg, Oral, TID AC  mirtazapine, 15 mg, Oral, Nightly  nystatin, , Topical, Q12H  piperacillin-tazobactam, 3.375 g, Intravenous, Q8H  predniSONE, 5 mg, Oral, Daily With Breakfast  QUEtiapine, 25 mg, Oral, Nightly  sildenafil, 10 mg, Oral, TID  sodium chloride, 10 mL, Intravenous, Q12H  sodium chloride, 10 mL, Intravenous, Q12H  sodium chloride, 10 mL, Intravenous, Q12H  tacrolimus, 1 mg, Oral, BID      heparin, 18 Units/kg/hr, Last Rate: 18 Units/kg/hr (12/05/24 0631)        Medication Review: Reviewed    Assessment & Plan     1) Kidney Transplant  2) TONYA - HARRY/ATN, Cr bumped to 3  3) DVT  4) Hypotension  5) Anemia    Plan: Cr up to 3 = HARRY/ATN.  Lytes OK.  Midodrine for BP support.  Lasix x 2 today.  Transplant US with no hydro and no arterial or venous stenosis.  On AC and Vascular following.  S/P fistulogram.  Hgb 7.9.  Repleting iron.  ABX per primary.  Pulm following as well.  Palliative following.       Naun Falcon MD  [facility]  12/05/24  07:42 EST

## 2024-12-05 NOTE — PLAN OF CARE
Goal Outcome Evaluation:  Plan of Care Reviewed With: patient, child           Outcome Evaluation: Pt is a 79 y.o. year old female admitted to [facility] with R arm pain.  Pt was found to have RUE DVT and IJ DVT and has been on anticoagulant therapy (heparin drip) since 11/29/24,  s/p right arm fistulogram, central venogram, balloon angioplasty of the right cephalic and subclavian veins on 12/3. She admitted from [facility] where she has been for SNF. Pt is a poor historian with very impaired STM.  Pmhx significant for chronic HFpEF, Afib, COPD, non-obstructive CAD, asthma, HTN, kidney transplant and lung cancer.  Pt has been at [facility] for SNF and before that she was at [facility]. Per son's report, she was walking short distances with therapy at SNF with walker. She presents today with impaired activity tolerance, strength, ROM, and blaance. Only able to tolerate sitting at EOB, unsafe to progress to standing trial due to debility. Assist of 2 is needed for subsequent therapy sessoins.  Activity progression is also limited by significant edema in all extremities. Assist of 2 is needed for subsequent therapy sessoins. PT recommends DC to SNF once she's medically stable. PT will follow.    Anticipated Discharge Disposition (PT): skilled nursing facility

## 2024-12-05 NOTE — PROGRESS NOTES
Nutrition Services  Patient Name: Jaja Carcamo  YOB: 1945  MRN: [id number]  Admission date: 11/29/2024    PROGRESS NOTE      Nutrition Intervention Updates: Encourage PO intake    Magic Cups at lunch/dinner (Provides 580 kcals, 18 g protein if consumed)         Encounter Information: Checking in on PO intake. Intake remains low. Pt previously declined boost supplements, will trial magic cup. Will keep current diet restrictions due to high normal K+. Could consider discontinue of this if pt is wanting items that she is not allowed.        PO Diet: Diet: Regular/House, Renal; Low Potassium; Fluid Consistency: Thin (IDDSI 0)   PO Supplements: None ordered    PO Intake:  0-25%        Current nutrition support: -   Nutrition support review: -       Labs (reviewed below): High phos, will not add further restrictions        GI Function:  BM 12/04, pt has stool softeners ordered PRN        Brief weight review   Wt Readings from Last 10 Encounters:   12/03/24 0927 81.6 kg (180 lb)   12/01/24 0330 81.8 kg (180 lb 5.4 oz)   11/29/24 1715 81 kg (178 lb 9.2 oz)   11/29/24 1238 74.5 kg (164 lb 3.9 oz)   06/13/24 0452 74.4 kg (164 lb 0.4 oz)   06/11/24 0600 67.5 kg (148 lb 13 oz)   06/08/24 0400 62.5 kg (137 lb 12.6 oz)   06/06/24 0447 64.4 kg (141 lb 15.6 oz)   06/05/24 1152 71.3 kg (157 lb 3 oz)   06/03/24 0501 71.3 kg (157 lb 3 oz)   06/01/24 0644 72.2 kg (159 lb 2.8 oz)   05/29/24 0445 84.7 kg (186 lb 11.7 oz)   05/28/24 0444 84.2 kg (185 lb 10 oz)   05/27/24 0526 84.9 kg (187 lb 2.7 oz)   05/26/24 0406 84.6 kg (186 lb 8.2 oz)   05/25/24 1100 84.6 kg (186 lb 8.2 oz)   05/25/24 0438 85.1 kg (187 lb 9.8 oz)   05/24/24 1143 86.2 kg (190 lb)   05/22/24 0314 89.3 kg (196 lb 13.9 oz)   05/21/24 0430 88.6 kg (195 lb 5.2 oz)   05/20/24 1503 92.1 kg (203 lb)   05/20/24 0413 92.3 kg (203 lb 7.8 oz)   05/19/24 0500 89.9 kg (198 lb 3.1 oz)   05/18/24 1100 88.9 kg (195 lb 15.8 oz)   05/18/24 0308 92.9 kg (204 lb 12.9 oz)    05/17/24 2150 80.7 kg (178 lb)   08/16/22 0535 84.7 kg (186 lb 11.7 oz)   08/14/22 0433 81.3 kg (179 lb 3.7 oz)   08/12/22 0500 81.4 kg (179 lb 7.3 oz)   08/11/22 2154 81.4 kg (179 lb 7.3 oz)   08/11/22 1301 81.6 kg (180 lb)   03/01/22 0804 83 kg (182 lb 15.7 oz)   03/01/22 0527 83 kg (182 lb 15.7 oz)   02/28/22 2241 81 kg (178 lb 9.2 oz)   10/14/20 1055 (P) 85.3 kg (188 lb)        Results from last 7 days   Lab Units 12/05/24  0602 12/04/24  0429 12/03/24  0051 12/02/24  0018   SODIUM mmol/L 133* 133* 135* 137   POTASSIUM mmol/L 5.1 4.6 5.0 4.7   CHLORIDE mmol/L 101 100 100 101   CO2 mmol/L 20.5* 21.8* 22.5 27.5   BUN mg/dL 37* 33* 30* 24*   CREATININE mg/dL 3.01* 2.33* 1.71* 1.08*   CALCIUM mg/dL 9.2 8.8 8.8 8.6   BILIRUBIN mg/dL  --   --   --  0.6   ALK PHOS U/L  --   --   --  60   ALT (SGPT) U/L  --   --   --  5   AST (SGOT) U/L  --   --   --  18   GLUCOSE mg/dL 105* 127* 96 81     Results from last 7 days   Lab Units 12/05/24  0602 12/04/24 0429 12/03/24  0051   MAGNESIUM mg/dL 1.9 1.8 1.9   PHOSPHORUS mg/dL 5.2* 4.2 4.5   HEMOGLOBIN g/dL 7.9* 7.3* 7.1*   HEMATOCRIT % 27.8* 25.9* 25.0*         RD to follow up per protocol.    Electronically signed by:  Lindy Deleon RD  12/05/24 09:09 EST

## 2024-12-05 NOTE — PROGRESS NOTES
[facility] MEDICINE SERVICE  DAILY PROGRESS NOTE    NAME: Jaja Carcamo  : 1945  MRN: [id number]      LOS: 5 days     PROVIDER OF SERVICE: Khris Tomas MD    Chief Complaint: Acute deep vein thrombosis (DVT) of other vein of right upper extremity    Subjective:     Interval History:  History taken from: patient    History of Present Illness: Jaja Carcamo is a 79 y.o. female with a CMH of atrial flutter, COPD, previous history of ESRD now with renal transplant, hypertension who presents to the hospital with complaints of right arm swelling and pain.  The patient is a poor historian but was able to tell me that she has an AV fistula on her right arm for previous history of ESRD on dialysis.  However she received a renal transplant a few years ago and has not required hemodialysis since then.  She did notice that over the past few weeks she has had increasing right upper extremity pain and swelling that is extending all the way up to her right shoulder.  She is complaining of some enlargement of veins in her right upper arm and shoulder area.  She also complains of pain in that area.  She denies any shortness of breath, cough, congestion, fevers, chills.  RUE ultrasound in the ER did show extensive DVT of the right internal jugular vein.       seen in bed LINNEA, VSS, DW RN,   examined in bed acute distress, dyspnea on 8 L of oxygen, patient with significant edema left right upper extremity.  Poor access, ordered PICC team for line placement.  Discussed with RN.  24 patient seen in bed no acute distress, dyspnea on 8 L, patient with hypotension.  Will increase midodrine, received fluid bolus.  Will give albumin.  Discussed with RN.  12/3/24 seen in bed NAD, BP 94/56. Wbc 16, underwent:Right arm fistulogram  Central venogram  Balloon angioplasty of right cephalic vein with 7 mm, 10 mm, and 12 mm angioplasty balloons  Balloon angioplasty of right subclavian vein with 7mm, 10mm, and  12mm angioplasty balloons3\  12/4/2024 patient seen and examined in bed acute distress, assessment #2 confusion are normal.  Heart rate 125.  Patient with fatigue weakness, dyspnea on 5 L.  Palliative care consulted.  12/5/24 patient seen in bed NAD, doing better today, will change anticoagulation to po, and antibiotics to po,     Review of Systems  10 point ROS note except for above  Objective:     Vital Signs  Temp:  [96 °F (35.6 °C)-98 °F (36.7 °C)] 97.2 °F (36.2 °C)  Heart Rate:  [106-124] 106  Resp:  [14-16] 16  BP: ()/(63-90) 116/90  Flow (L/min) (Oxygen Therapy):  [5] 5   Body mass index is 29.05 kg/m².    Physical Exam  General Appearance:  Alert, cooperative, no distress, appears stated age  Head:   Normocephalic, without obvious abnormality, atraumatic  Eyes:   PERRL, conjunctiva/corneas clear, EOM's intact, fundi benign, both eyes  Ears:    Normal TM's and external ear canals, both ears  Nose:Nares normal, septum midline, mucosa normal, no drainage or sinus tenderness  Throat:Lips, mucosa, and tongue normal; teeth and gums normal  Neck:Supple, symmetrical, trachea midline, no adenopathy, thyroid: not enlarged, symmetric, no tenderness/mass/nodules, no carotid bruit or JVD  Lungs:             Clear to auscultation bilaterally, respirations unlabored  Heart:  Regular rate and rhythm, S1, S2 normal, no murmur, rub or gallop  Abdomen:  Soft, non-tender, bowel sounds active all four quadrants,  no masses, no organomegaly  Extremities:RUE nonpitting edema with nonfunctioning AV fistula, enlargement of right upper arm veins  Pulses:2+ and symmetric  Skin:Skin color, texture, turgor normal, no rashes or lesions  Neurologic: Normal     Diagnostic Data    Results from last 7 days   Lab Units 12/05/24  0602 12/03/24  0051 12/02/24  0018   WBC 10*3/mm3 14.69*   < > 12.86*   HEMOGLOBIN g/dL 7.9*   < > 8.1*   HEMATOCRIT % 27.8*   < > 28.5*   PLATELETS 10*3/mm3 177   < > 200   GLUCOSE mg/dL 105*   < > 81    CREATININE mg/dL 3.01*   < > 1.08*   BUN mg/dL 37*   < > 24*   SODIUM mmol/L 133*   < > 137   POTASSIUM mmol/L 5.1   < > 4.7   AST (SGOT) U/L  --   --  18   ALT (SGPT) U/L  --   --  5   ALK PHOS U/L  --   --  60   BILIRUBIN mg/dL  --   --  0.6   ANION GAP mmol/L 11.5   < > 8.5    < > = values in this interval not displayed.       No radiology results for the last day      I reviewed the patient's new clinical results.    Assessment/Plan:     Active and Resolved Problems  Active Hospital Problems    Diagnosis  POA    **Acute deep vein thrombosis (DVT) of other vein of right upper extremity [I82.621]  Yes    Acute DVT (deep venous thrombosis) [I82.409]  Yes      Resolved Hospital Problems   No resolved problems to display.      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  -Nephrology consult     Hypothyroidism  -Continue Synthroid    VTE Prophylaxis:  Pharmacologic VTE prophylaxis orders are present.    Disposition Planning:   Barriers to Discharge:dvt  Anticipated Date of Discharge: 12/3  Place of  Discharge: home      Time: 35 minutes     There are no questions and answers to display.       Signature: Electronically signed by Khris Tomas MD, 12/05/24, 13:41 EST.  [facility]ist Team

## 2024-12-05 NOTE — THERAPY EVALUATION
Patient Name: Jaja Carcamo  : 1945    MRN: [id number]                              Today's Date: 2024       Admit Date: 2024    Visit Dx:     ICD-10-CM ICD-9-CM   1. Atrial fibrillation with RVR  I48.91 427.31   2. A-V fistula  I77.0 447.0   3. Arm DVT (deep venous thromboembolism), acute, right  I82.621 453.82     Patient Active Problem List   Diagnosis    COPD (chronic obstructive pulmonary disease)    Acute UTI    Atrial fibrillation with rapid ventricular response    Volume overload    Fall    Hypothyroidism, unspecified    Hyperlipidemia    History of suicide attempt    History of lobectomy of lung    Irritable bowel syndrome    History of kidney transplant    Long term (current) use of immunosuppressive biologic    Long term current use of anticoagulant therapy    Menopausal flushing    Mitral valve regurgitation    Tricuspid valve regurgitation    History of lung cancer    Nonobstructive atherosclerosis of coronary artery    Obsessive-compulsive disorder    Osteoarthritis of left hip    Primary osteoarthritis of right hip    Paroxysmal supraventricular tachycardia    Peripheral neuropathy    History of DVT (deep vein thrombosis)    Personal history of peptic ulcer disease    Pulmonary hypertension    Seasonal allergies    History of repair of hip joint    Gout    Gastroesophageal reflux disease    Hearing loss    Edema of both lower extremities due to peripheral venous insufficiency    Mixed anxiety and depressive disorder    Chronic pain    Chronic hypoxemic respiratory failure    Chronic diarrhea    Asthma    Osteoarthritis    Allergic rhinitis    Wegener's granulomatosis with renal involvement    Essential (primary) hypertension    Paroxysmal atrial fibrillation    Valvular heart disease    Acute exacerbation of CHF (congestive heart failure)    UTI (urinary tract infection)    C. difficile colitis    Acute deep vein thrombosis (DVT) of other vein of right upper extremity    Acute DVT  (deep venous thrombosis)     Past Medical History:   Diagnosis Date    Allergic rhinitis 04/14/2015    Asthma 08/10/2017    Atrial flutter 05/11/2017    Chronic diarrhea 04/15/2019    Chronic hypoxemic respiratory failure 01/18/2024    Chronic pain 02/03/2015    COPD (chronic obstructive pulmonary disease)     COVID-19 virus detected 08/11/2022    Cytokine release syndrome, grade 1 08/16/2022    Edema of both lower extremities due to peripheral venous insufficiency 03/12/2021    ESRD on hemodialysis 05/22/2013    Essential (primary) hypertension 03/03/2022    Fracture of ulnar styloid 05/19/2022    Fracture of unspecified carpal bone, right wrist, subsequent encounter for fracture with routine healing 03/03/2022    Gastroesophageal reflux disease 11/12/2020    Gout 08/10/2017    Hearing loss 08/10/2017    History of appendectomy     History of DVT (deep vein thrombosis) 11/12/2020    History of kidney transplant 06/30/2017    History of lobectomy of lung 01/18/2024 03/08/2016:  RIGHT Lower Lobe Mass--> Right Video-assisted thoracoscopy with a moderate-to-large wedge resection of the RLL (by Dr. Haris Mcconnell @ [facility])--> Poorly differentiated carcinoma of the RLL.      History of repair of hip joint 05/21/2013    History of suicide attempt 05/20/2024    Hyperlipidemia 08/11/2014    Hypothyroidism, unspecified 03/03/2022    Infection due to extended spectrum beta lactamase (ESBL) producing bacteria 03/11/2016    No A2K system hx. +ESBL E coli urine on 3/11/16.      Irritable bowel syndrome 04/15/2019    Long term (current) use of immunosuppressive biologic 04/28/2021    Long term current use of anticoagulant therapy 01/18/2024    Malignant neoplasm of lung 08/10/2017    Menopausal flushing 08/30/2021    Mitral valve regurgitation 07/06/2015    Mixed anxiety and depressive disorder 04/30/2015    Non-small cell lung cancer 08/10/2017    Nonobstructive atherosclerosis of coronary artery 06/02/2019     08/12/2022: CATH: [facility]: NSTEMI assoc with Covid-19: LM:-nl;  LAD: diffuse, Ca++; 30%; CIRC: Dominant. Normal; RCA: small; 50% diffuse, proximal and mid-segment.      NSTEMI (non-ST elevated myocardial infarction) 08/11/2022    Obsessive-compulsive disorder 04/15/2019    Osteoarthritis 05/20/2024    Paroxysmal atrial fibrillation 05/11/2017    Paroxysmal supraventricular tachycardia 05/11/2017    Peripheral neuropathy 08/11/2014    Personal history of peptic ulcer disease 11/12/2020    Postoperative anemia due to acute blood loss 05/22/2013    Right wrist fracture 03/01/2022    Seasonal allergies 08/10/2017    Secondary hyperparathyroidism of renal origin 09/14/2015    Tricuspid valve regurgitation 03/15/2017    Ulcer of lower extremity 08/30/2021    Unspecified acute appendicitis 03/03/2022    Valvular heart disease 05/20/2024    Wegener's granulomatosis with renal involvement 03/03/2022     Past Surgical History:   Procedure Laterality Date    APPENDECTOMY      ARTERIOVENOUS FISTULA/SHUNT SURGERY Right 12/3/2024    Procedure: FISTULOGRAM  and angioplasty RIGHT ARM;  Surgeon: Paulino Alva II, MD;  Location: [facility] HYBRID OR;  Service: Vascular;  Laterality: Right;    BREAST SURGERY Left     cysts rmeoved    CARDIAC CATHETERIZATION Right 08/12/2022    Procedure: Left Heart Cath and coronary angiogram;  Surgeon: Jak Gavin MD;  Location: [facility] CATH INVASIVE LOCATION;  Service: Cardiology;  Laterality: Right;    CLOSED REDUCTION WRIST FRACTURE Right 03/01/2022    Procedure: WRIST CLOSED REDUCTION;  Surgeon: Gaudencio Townsend MD;  Location: [facility] MAIN OR;  Service: Orthopedics;  Laterality: Right;    CYSTOSCOPY      HIP ARTHROPLASTY      HYSTERECTOMY      LUNG LOBECTOMY Right     TRANSPLANTATION RENAL        General Information       Row Name 12/05/24 7137          Physical Therapy Time and Intention    Document Type evaluation  -     Mode of Treatment physical therapy  -       Row Name 12/05/24 6857           General Information    Patient Profile Reviewed yes  -     Prior Level of Function --  son reports pt was walking with therapy a little at SNF with a walker. Staff assist with all mobility.  -     Existing Precautions/Restrictions fall  -     Barriers to Rehab medically complex;previous functional deficit;cognitive status  -[facility] Name 12/05/24 1632          Living Environment    People in Home facility resident  Has been in SNF at [facility] since 11/15/24 and before that she was at [facility]  -[facility] Name 12/05/24 1632          Home Main Entrance    Number of Stairs, Main Entrance none  -[facility] Name 12/05/24 1632          Stairs Within Home, Primary    Number of Stairs, Within Home, Primary none  -[facility] Name 12/05/24 1632          Cognition    Orientation Status (Cognition) oriented to;person;disoriented to;situation;time;verbal cues/prompts needed for orientation  Very poor short term memory  -[facility] Name 12/05/24 1632          Safety Issues/Impairments Affecting Functional Mobility    Safety Issues Affecting Function (Mobility) ability to follow commands;at risk behavior observed;awareness of need for assistance;friction/shear risk;insight into deficits/self-awareness;judgment;problem-solving;sequencing abilities  -     Impairments Affecting Function (Mobility) balance;cognition;endurance/activity tolerance;coordination;motor planning;pain;strength;sensation/sensory awareness  -               User Key  (r) = Recorded By, (t) = Taken By, (c) = Cosigned By      Initials Name Provider Type     Alicja Veras PT Physical Therapist                   Mobility       Row Name 12/05/24 1638          Bed Mobility    Bed Mobility bed mobility (all) activities;rolling left;rolling right;scooting/bridging  -     Rolling Left [place] (Bed Mobility) moderate assist (50% patient effort);maximum assist (25% patient effort)  -     Rolling Right [place] (Bed  Mobility) moderate assist (50% patient effort);maximum assist (25% patient effort)  -     Scooting/Bridging Love (Bed Mobility) dependent (less than 25% patient effort);2 person assist  -     Assistive Device (Bed Mobility) bed rails;repositioning sheet  -AH       Row Name 12/05/24 1638          Bed-Chair Transfer    Bed-Chair Love (Transfers) unable to assess  -AH       Row Name 12/05/24 1638          Sit-Stand Transfer    Sit-Stand Love (Transfers) unable to assess  -AH       Row Name 12/05/24 1638          Gait/Stairs (Locomotion)    Love Level (Gait) unable to assess  -     Patient was able to Ambulate no, other medical factors prevent ambulation  -     Reason Patient was unable to Ambulate Excessive Weakness  -               User Key  (r) = Recorded By, (t) = Taken By, (c) = Cosigned By      Initials Name Provider Type     Alicja Veras PT Physical Therapist                   Obj/Interventions       Row Name 12/05/24 1639          Range of Motion Comprehensive    General Range of Motion lower extremity range of motion deficits identified  -     Comment, General Range of Motion BLE limited >50% by edema and weakness.  -AH       Row Name 12/05/24 1639          Strength Comprehensive (MMT)    General Manual Muscle Testing (MMT) Assessment lower extremity strength deficits identified  -     Comment, General Manual Muscle Testing (MMT) Assessment BLE grossly 3-/5.  -[facility] Name 12/05/24 1639          Motor Skills    Motor Skills functional endurance  -     Functional Endurance Poor  -AH       Row Name 12/05/24 1639          Balance    Balance Assessment sitting static balance;sitting dynamic balance  -     Static Sitting Balance contact guard  -     Dynamic Sitting Balance minimal assist  -     Position, Sitting Balance sitting edge of bed  -AH       Row Name 12/05/24 1639          Sensory Assessment (Somatosensory)    Sensory Assessment (Somatosensory)  "--  impaired sensation BLE \"I can barely feel anything\". light touch absent B feet, intact at knees.  -               User Key  (r) = Recorded By, (t) = Taken By, (c) = Cosigned By      Initials Name Provider Type     Alicja Veras, PT Physical Therapist                   Goals/Plan       [facility] Name 12/05/24 1656          Bed Mobility Goal 1 (PT)    Activity/Assistive Device (Bed Mobility Goal 1, PT) bed mobility activities, all  -     [place] Level/Cues Needed (Bed Mobility Goal 1, PT) standby assist  -     Time Frame (Bed Mobility Goal 1, PT) long term goal (LTG);2 weeks  -[facility] Name 12/05/24 1656          Transfer Goal 1 (PT)    Activity/Assistive Device (Transfer Goal 1, PT) transfers, all;sit-to-stand/stand-to-sit;bed-to-chair/chair-to-bed;toilet  -     [place] Level/Cues Needed (Transfer Goal 1, PT) standby assist  -     Time Frame (Transfer Goal 1, PT) long term goal (LTG);2 weeks  -[facility] Name 12/05/24 1656          Gait Training Goal 1 (PT)    Activity/Assistive Device (Gait Training Goal 1, PT) gait (walking locomotion);assistive device use  -     [place] Level (Gait Training Goal 1, PT) contact guard required  -     Distance (Gait Training Goal 1, PT) 15'  -     Time Frame (Gait Training Goal 1, PT) long term goal (LTG);2 weeks  -[facility] Name 12/05/24 1656          Balance Goal 1 (PT)    Activity/Assistive Device (Balance Goal) standing static balance;standing dynamic balance  -     [place] Level/Cues Needed (Balance Goal 1, PT) contact guard required  -     Time Frame (Balance Goal 1, PT) long-term goal (LTG);2 weeks  -[facility] Name 12/05/24 1656          Therapy Assessment/Plan (PT)    Planned Therapy Interventions (PT) balance training;bed mobility training;gait training;strengthening;patient/family education;transfer training;ROM (range of motion);home exercise program  -               User Key  (r) = Recorded By, (t) = Taken By, (c) = " Cosigned By      Initials Name Provider Type     Alicja Veras, PT Physical Therapist                   Clinical Impression       Row Name 12/05/24 1642          Pain    Pretreatment Pain Rating --  -     Pain Location extremity  -     Pain Side/Orientation lower  -     Response to Pain Interventions activity participation with increased pain  -       Row Name 12/05/24 1642          Pain Scale: FACES Pre/Post-Treatment    Pain: FACES Scale, Pretreatment 4-->hurts little more  -     Posttreatment Pain Rating 6-->hurts even more  -       Row Name 12/05/24 1642          Plan of Care Review    Plan of Care Reviewed With patient;[facility]  -     Outcome Evaluation Pt is a 79 y.o. year old female admitted to [facility] with R arm pain.  Pt was found to have RUE DVT and IJ DVT and has been on anticoagulant therapy (heparin drip) since 11/29/24,  s/p right arm fistulogram, central venogram, balloon angioplasty of the right cephalic and subclavian veins on 12/3. She admitted from [facility] where she has been for SNF. Pt is a poor historian with very impaired STM.  Pmhx significant for chronic HFpEF, Afib, COPD, non-obstructive CAD, asthma, HTN, kidney transplant and lung cancer.  Pt has been at [facility] for SNF and before that she was at [facility]. Per son's report, she was walking short distances with therapy at SNF with walker. She presents today with impaired activity tolerance, strength, ROM, and blaance. Only able to tolerate sitting at EOB, unsafe to progress to standing trial due to debility. Assist of 2 is needed for subsequent therapy sessoins.  Activity progression is also limited by significant edema in all extremities. Assist of 2 is needed for subsequent therapy sessoins. PT recommends DC to SNF once she's medically stable. PT will follow.  -       Row Name 12/05/24 1642          Therapy Assessment/Plan (PT)    Rehab Potential (PT) fair  -AH     Criteria for Skilled Interventions Met  (PT) yes;skilled treatment is necessary  -     Therapy Frequency (PT) 3 times/wk  -     Predicted Duration of Therapy Intervention (PT) until DC or goals met  -       Row Name 12/05/24 1642          Vital Signs    Pretreatment Heart Rate (beats/min) 108  -AH     Intratreatment Heart Rate (beats/min) 118  -AH     Posttreatment Heart Rate (beats/min) 120  -AH     Pre SpO2 (%) 100  -AH     O2 Delivery Pre Treatment supplemental O2  -AH     Intra SpO2 (%) 91  -AH     O2 Delivery Intra Treatment supplemental O2  -AH     Post SpO2 (%) 100  -AH     O2 Delivery Post Treatment supplemental O2  -AH     Pre Patient Position Supine  -AH     Intra Patient Position Sitting  -AH     Post Patient Position Supine  -AH       Row Name 12/05/24 1642          Positioning and Restraints    Post Treatment Position bed  -AH     In Bed exit alarm on;encouraged to call for assist;call light within reach;notified nsg;supine  -               User Key  (r) = Recorded By, (t) = Taken By, (c) = Cosigned By      Initials Name Provider Type     Alicja Veras, PT Physical Therapist                   Outcome Measures       Row Name 12/05/24 1658 12/05/24 0845       How much help from another person do you currently need...    Turning from your back to your side while in flat bed without using bedrails? 2  - 3  -SS    Moving from lying on back to sitting on the side of a flat bed without bedrails? 2  - 2  -SS    Moving to and from a bed to a chair (including a wheelchair)? 1  - 2  -SS    Standing up from a chair using your arms (e.g., wheelchair, bedside chair)? 1  - 2  -SS    Climbing 3-5 steps with a railing? 1  - 1  -SS    To walk in hospital room? 1  - 1  -SS    AM-PAC 6 Clicks Score (PT) 8  - 11  -SS    Highest Level of Mobility Goal 3 --> Sit at edge of bed  - 4 --> Transfer to chair/commode  -SS      Row Name 12/05/24 1658          Functional Assessment    Outcome Measure Options AM-PAC 6 Clicks Basic Mobility (PT)   -               User Key  (r) = Recorded By, (t) = Taken By, (c) = Cosigned By      Initials Name Provider Type     Alicja Veras, PT Physical Therapist    Scarlet Reza RN Registered Nurse                                 Physical Therapy Education       Title: PT OT SLP Therapies (In Progress)       Topic: Physical Therapy (In Progress)       Point: Mobility training (In Progress)       Learning Progress Summary            Patient Acceptance, E, NR by  at 12/5/2024 1658    Comment: poor memory/ carry over                      Point: Home exercise program (In Progress)       Learning Progress Summary            Patient Acceptance, E, NR by  at 12/5/2024 1658    Comment: poor memory/ carry over                      Point: Body mechanics (In Progress)       Learning Progress Summary            Patient Acceptance, E, NR by  at 12/5/2024 1658    Comment: poor memory/ carry over                      Point: Precautions (In Progress)       Learning Progress Summary            Patient Acceptance, E, NR by  at 12/5/2024 1658    Comment: poor memory/ carry over                                      User Key       Initials Effective Dates Name Provider Type [facility] 12/04/23 -  Alicja Veras, PT Physical Therapist PT                  PT Recommendation and Plan  Planned Therapy Interventions (PT): balance training, bed mobility training, gait training, strengthening, patient/family education, transfer training, ROM (range of motion), home exercise program  Outcome Evaluation: Pt is a 79 y.o. year old female admitted to [facility] with R arm pain.  Pt was found to have RUE DVT and IJ DVT and has been on anticoagulant therapy (heparin drip) since 11/29/24,  s/p right arm fistulogram, central venogram, balloon angioplasty of the right cephalic and subclavian veins on 12/3. She admitted from [facility] where she has been for SNF. Pt is a poor historian with very impaired STM.  Pmhx significant for chronic  HFpEF, Afib, COPD, non-obstructive CAD, asthma, HTN, kidney transplant and lung cancer.  Pt has been at [facility] for SNF and before that she was at [facility]. Per son's report, she was walking short distances with therapy at SNF with walker. She presents today with impaired activity tolerance, strength, ROM, and blaance. Only able to tolerate sitting at EOB, unsafe to progress to standing trial due to debility. Assist of 2 is needed for subsequent therapy sessoins.  Activity progression is also limited by significant edema in all extremities. Assist of 2 is needed for subsequent therapy sessoins. PT recommends DC to SNF once she's medically stable. PT will follow.     Time Calculation:         PT Charges       Row Name 12/05/24 1658             Time Calculation    Start Time 1115  -      Stop Time 1140  -      Time Calculation (min) 25 min  -      PT Non-Billable Time (min) 0 min  -      PT Received On 12/05/24  -      PT - Next Appointment 12/07/24  -      PT Goal Re-Cert Due Date 12/19/24  -         Time Calculation- PT    Total Timed Code Minutes- PT 0 minute(s)  -                User Key  (r) = Recorded By, (t) = Taken By, (c) = Cosigned By      Initials Name Provider Type     Alicja Veras, PT Physical Therapist                  Therapy Charges for Today       Code Description Service Date Service Provider Modifiers Qty    [id number]  PT THERAPEUTIC ACT EA 15 MIN 12/5/2024 Alicja Veras, PT GP 1    [id number]  PT EVAL MOD COMPLEXITY 4 12/5/2024 Alicja Veras, PT GP 1            PT G-Codes  Outcome Measure Options: AM-PAC 6 Clicks Basic Mobility (PT)  AM-PAC 6 Clicks Score (PT): 8  AM-PAC 6 Clicks Score (OT): 12  PT Discharge Summary  Anticipated Discharge Disposition (PT): skilled nursing facility    Alicja Veras PT  12/5/2024

## 2024-12-06 LAB
ALBUMIN SERPL-MCNC: 3.2 G/DL (ref 3.5–5.2)
ANION GAP SERPL CALCULATED.3IONS-SCNC: 10.7 MMOL/L (ref 5–15)
APTT PPP: 38.7 SECONDS (ref 22.7–35.4)
BASOPHILS # BLD AUTO: 0.04 10*3/MM3 (ref 0–0.2)
BASOPHILS NFR BLD AUTO: 0.3 % (ref 0–1.5)
BUN SERPL-MCNC: 40 MG/DL (ref 8–23)
BUN/CREAT SERPL: 11.7 (ref 7–25)
CALCIUM SPEC-SCNC: 9.1 MG/DL (ref 8.6–10.5)
CHLORIDE SERPL-SCNC: 97 MMOL/L (ref 98–107)
CO2 SERPL-SCNC: 20.3 MMOL/L (ref 22–29)
CREAT SERPL-MCNC: 3.42 MG/DL (ref 0.57–1)
DEPRECATED RDW RBC AUTO: 60.6 FL (ref 37–54)
EGFRCR SERPLBLD CKD-EPI 2021: 13.3 ML/MIN/1.73
EOSINOPHIL # BLD AUTO: 0.01 10*3/MM3 (ref 0–0.4)
EOSINOPHIL NFR BLD AUTO: 0.1 % (ref 0.3–6.2)
ERYTHROCYTE [DISTWIDTH] IN BLOOD BY AUTOMATED COUNT: 18.5 % (ref 12.3–15.4)
GLUCOSE SERPL-MCNC: 130 MG/DL (ref 65–99)
HCT VFR BLD AUTO: 27.9 % (ref 34–46.6)
HGB BLD-MCNC: 7.9 G/DL (ref 12–15.9)
IMM GRANULOCYTES # BLD AUTO: 0.6 10*3/MM3 (ref 0–0.05)
IMM GRANULOCYTES NFR BLD AUTO: 5 % (ref 0–0.5)
LYMPHOCYTES # BLD AUTO: 1.66 10*3/MM3 (ref 0.7–3.1)
LYMPHOCYTES NFR BLD AUTO: 13.8 % (ref 19.6–45.3)
MAGNESIUM SERPL-MCNC: 1.9 MG/DL (ref 1.6–2.4)
MCH RBC QN AUTO: 25.7 PG (ref 26.6–33)
MCHC RBC AUTO-ENTMCNC: 28.3 G/DL (ref 31.5–35.7)
MCV RBC AUTO: 90.9 FL (ref 79–97)
MONOCYTES # BLD AUTO: 1.02 10*3/MM3 (ref 0.1–0.9)
MONOCYTES NFR BLD AUTO: 8.5 % (ref 5–12)
NEUTROPHILS NFR BLD AUTO: 72.3 % (ref 42.7–76)
NEUTROPHILS NFR BLD AUTO: 8.74 10*3/MM3 (ref 1.7–7)
NRBC BLD AUTO-RTO: 1.7 /100 WBC (ref 0–0.2)
PHOSPHATE SERPL-MCNC: 5.5 MG/DL (ref 2.5–4.5)
PLATELET # BLD AUTO: 180 10*3/MM3 (ref 140–450)
PMV BLD AUTO: 9.9 FL (ref 6–12)
POTASSIUM SERPL-SCNC: 5.1 MMOL/L (ref 3.5–5.2)
RBC # BLD AUTO: 3.07 10*6/MM3 (ref 3.77–5.28)
SODIUM SERPL-SCNC: 128 MMOL/L (ref 136–145)
TACROLIMUS BLD LC/MS/MS-MCNC: 15.2 NG/ML (ref 2–20)
WBC NRBC COR # BLD AUTO: 12.07 10*3/MM3 (ref 3.4–10.8)

## 2024-12-06 PROCEDURE — 63710000001 TACROLIMUS PER 1 MG: Performed by: STUDENT IN AN ORGANIZED HEALTH CARE EDUCATION/TRAINING PROGRAM

## 2024-12-06 PROCEDURE — 25010000002 NA FERRIC GLUC CPLX PER 12.5 MG: Performed by: INTERNAL MEDICINE

## 2024-12-06 PROCEDURE — 85025 COMPLETE CBC W/AUTO DIFF WBC: CPT | Performed by: INTERNAL MEDICINE

## 2024-12-06 PROCEDURE — 97530 THERAPEUTIC ACTIVITIES: CPT

## 2024-12-06 PROCEDURE — 80069 RENAL FUNCTION PANEL: CPT | Performed by: STUDENT IN AN ORGANIZED HEALTH CARE EDUCATION/TRAINING PROGRAM

## 2024-12-06 PROCEDURE — 83735 ASSAY OF MAGNESIUM: CPT | Performed by: INTERNAL MEDICINE

## 2024-12-06 PROCEDURE — 25010000002 PROCHLORPERAZINE 10 MG/2ML SOLUTION: Performed by: STUDENT IN AN ORGANIZED HEALTH CARE EDUCATION/TRAINING PROGRAM

## 2024-12-06 PROCEDURE — 97112 NEUROMUSCULAR REEDUCATION: CPT

## 2024-12-06 PROCEDURE — 63710000001 PREDNISONE PER 5 MG: Performed by: STUDENT IN AN ORGANIZED HEALTH CARE EDUCATION/TRAINING PROGRAM

## 2024-12-06 PROCEDURE — 97535 SELF CARE MNGMENT TRAINING: CPT

## 2024-12-06 PROCEDURE — 85730 THROMBOPLASTIN TIME PARTIAL: CPT | Performed by: STUDENT IN AN ORGANIZED HEALTH CARE EDUCATION/TRAINING PROGRAM

## 2024-12-06 RX ORDER — FUROSEMIDE 40 MG/1
80 TABLET ORAL
Status: COMPLETED | OUTPATIENT
Start: 2024-12-06 | End: 2024-12-06

## 2024-12-06 RX ORDER — TACROLIMUS 0.5 MG/1
0.5 CAPSULE ORAL 2 TIMES DAILY
Status: DISCONTINUED | OUTPATIENT
Start: 2024-12-07 | End: 2024-12-11

## 2024-12-06 RX ORDER — TACROLIMUS 1 MG/1
1 CAPSULE ORAL 2 TIMES DAILY
Status: DISCONTINUED | OUTPATIENT
Start: 2024-12-07 | End: 2024-12-06

## 2024-12-06 RX ADMIN — LEVOTHYROXINE SODIUM 50 MCG: 50 TABLET ORAL at 06:08

## 2024-12-06 RX ADMIN — MIDODRINE HYDROCHLORIDE 15 MG: 5 TABLET ORAL at 13:18

## 2024-12-06 RX ADMIN — FAMOTIDINE 20 MG: 20 TABLET, FILM COATED ORAL at 08:08

## 2024-12-06 RX ADMIN — SILDENAFIL 10 MG: 20 TABLET ORAL at 08:08

## 2024-12-06 RX ADMIN — Medication 10 ML: at 21:03

## 2024-12-06 RX ADMIN — DOXYCYCLINE 100 MG: 100 CAPSULE ORAL at 08:09

## 2024-12-06 RX ADMIN — Medication 10 ML: at 08:09

## 2024-12-06 RX ADMIN — PROCHLORPERAZINE EDISYLATE 5 MG: 5 INJECTION INTRAMUSCULAR; INTRAVENOUS at 13:28

## 2024-12-06 RX ADMIN — APIXABAN 5 MG: 5 TABLET, FILM COATED ORAL at 08:09

## 2024-12-06 RX ADMIN — APIXABAN 5 MG: 5 TABLET, FILM COATED ORAL at 21:02

## 2024-12-06 RX ADMIN — MIDODRINE HYDROCHLORIDE 15 MG: 5 TABLET ORAL at 17:46

## 2024-12-06 RX ADMIN — DOXYCYCLINE 100 MG: 100 CAPSULE ORAL at 21:02

## 2024-12-06 RX ADMIN — NYSTATIN: 100000 POWDER TOPICAL at 21:02

## 2024-12-06 RX ADMIN — Medication 12.5 MG: at 21:02

## 2024-12-06 RX ADMIN — SODIUM CHLORIDE 125 MG: 9 INJECTION, SOLUTION INTRAVENOUS at 08:09

## 2024-12-06 RX ADMIN — MIDODRINE HYDROCHLORIDE 15 MG: 5 TABLET ORAL at 08:08

## 2024-12-06 RX ADMIN — PROCHLORPERAZINE EDISYLATE 5 MG: 5 INJECTION INTRAMUSCULAR; INTRAVENOUS at 08:16

## 2024-12-06 RX ADMIN — HYDROCODONE BITARTRATE AND ACETAMINOPHEN 1 TABLET: 7.5; 325 TABLET ORAL at 06:15

## 2024-12-06 RX ADMIN — ATORVASTATIN CALCIUM 10 MG: 10 TABLET ORAL at 08:09

## 2024-12-06 RX ADMIN — DULOXETINE HYDROCHLORIDE 20 MG: 20 CAPSULE, DELAYED RELEASE ORAL at 08:09

## 2024-12-06 RX ADMIN — QUETIAPINE FUMARATE 25 MG: 25 TABLET ORAL at 21:02

## 2024-12-06 RX ADMIN — Medication 10 ML: at 21:02

## 2024-12-06 RX ADMIN — FUROSEMIDE 80 MG: 40 TABLET ORAL at 08:08

## 2024-12-06 RX ADMIN — DIAZEPAM 5 MG: 5 TABLET ORAL at 17:47

## 2024-12-06 RX ADMIN — NYSTATIN: 100000 POWDER TOPICAL at 08:09

## 2024-12-06 RX ADMIN — POLYETHYLENE GLYCOL 3350 17 G: 17 POWDER, FOR SOLUTION ORAL at 08:15

## 2024-12-06 RX ADMIN — Medication 12.5 MG: at 08:08

## 2024-12-06 RX ADMIN — PREDNISONE 5 MG: 5 TABLET ORAL at 08:09

## 2024-12-06 RX ADMIN — HYDROXYZINE HYDROCHLORIDE 25 MG: 25 TABLET ORAL at 21:02

## 2024-12-06 RX ADMIN — TACROLIMUS 1 MG: 1 CAPSULE ORAL at 08:09

## 2024-12-06 RX ADMIN — SILDENAFIL 10 MG: 20 TABLET ORAL at 21:02

## 2024-12-06 RX ADMIN — SILDENAFIL 10 MG: 20 TABLET ORAL at 17:47

## 2024-12-06 RX ADMIN — LORAZEPAM 0.5 MG: 0.5 TABLET ORAL at 13:28

## 2024-12-06 RX ADMIN — FUROSEMIDE 80 MG: 40 TABLET ORAL at 17:46

## 2024-12-06 RX ADMIN — CHOLESTYRAMINE 4 G: 4 POWDER, FOR SUSPENSION ORAL at 13:18

## 2024-12-06 RX ADMIN — MIRTAZAPINE 15 MG: 15 TABLET, FILM COATED ORAL at 21:02

## 2024-12-06 NOTE — PROGRESS NOTES
"RENAL/KCC:     LOS: 6 days    Patient Care Team:  Kvng Guillen MD as PCP - General (Family Medicine)  Jak Gavin MD as Cardiologist (Cardiology)    Chief Complaint:  TONYA/CKD, Kidney transplant    Subjective     Interval History:   Chart reviewed  Oliguric but UOP slightly better  No severe uremic symptoms  Edema present    Objective     Vital Sign Min/Max for last 24 hours  Temp  Min: 97.2 °F (36.2 °C)  Max: 98 °F (36.7 °C)   BP  Min: 92/76  Max: 130/76   Pulse  Min: 106  Max: 124   Resp  Min: 13  Max: 19   SpO2  Min: 91 %  Max: 100 %   Flow (L/min) (Oxygen Therapy)  Min: 5  Max: 5   No data recorded     Flowsheet Rows      Flowsheet Row First Filed Value   Admission Height 167.6 cm (66\") Documented at 11/29/2024 1238   Admission Weight 74.5 kg (164 lb 3.9 oz) Documented at 11/29/2024 1238            No intake/output data recorded.  I/O last 3 completed shifts:  In: 360 [P.O.:360]  Out: 300 [Urine:300]    Physical Exam:  GEN: Awake, NAD  ENT: PERRL, EOMI, MMM  NECK: Supple, no JVD  CHEST: CTAB, no W/R/C  CV: RRR, no M/G/R, ++edema  ABD: Soft, NT, +BS  SKIN: Warm and Dry  NEURO: CN's intact      WBC WBC   Date Value Ref Range Status   12/06/2024 12.07 (H) 3.40 - 10.80 10*3/mm3 Final   12/05/2024 14.69 (H) 3.40 - 10.80 10*3/mm3 Final   12/04/2024 15.04 (H) 3.40 - 10.80 10*3/mm3 Final      HGB Hemoglobin   Date Value Ref Range Status   12/06/2024 7.9 (L) 12.0 - 15.9 g/dL Final   12/05/2024 7.9 (L) 12.0 - 15.9 g/dL Final   12/04/2024 7.3 (L) 12.0 - 15.9 g/dL Final      HCT Hematocrit   Date Value Ref Range Status   12/06/2024 27.9 (L) 34.0 - 46.6 % Final   12/05/2024 27.8 (L) 34.0 - 46.6 % Final   12/04/2024 25.9 (L) 34.0 - 46.6 % Final      Platlets No results found for: \"LABPLAT\"   MCV MCV   Date Value Ref Range Status   12/06/2024 90.9 79.0 - 97.0 fL Final   12/05/2024 91.7 79.0 - 97.0 fL Final   12/04/2024 91.8 79.0 - 97.0 fL Final          Sodium Sodium   Date Value Ref Range Status   12/06/2024 128 (L) 136 " "- 145 mmol/L Final   12/05/2024 133 (L) 136 - 145 mmol/L Final   12/04/2024 133 (L) 136 - 145 mmol/L Final      Potassium Potassium   Date Value Ref Range Status   12/06/2024 5.1 3.5 - 5.2 mmol/L Final   12/05/2024 5.1 3.5 - 5.2 mmol/L Final   12/04/2024 4.6 3.5 - 5.2 mmol/L Final      Chloride Chloride   Date Value Ref Range Status   12/06/2024 97 (L) 98 - 107 mmol/L Final   12/05/2024 101 98 - 107 mmol/L Final   12/04/2024 100 98 - 107 mmol/L Final      CO2 CO2   Date Value Ref Range Status   12/06/2024 20.3 (L) 22.0 - 29.0 mmol/L Final   12/05/2024 20.5 (L) 22.0 - 29.0 mmol/L Final   12/04/2024 21.8 (L) 22.0 - 29.0 mmol/L Final      BUN BUN   Date Value Ref Range Status   12/06/2024 40 (H) 8 - 23 mg/dL Final   12/05/2024 37 (H) 8 - 23 mg/dL Final   12/04/2024 33 (H) 8 - 23 mg/dL Final      Creatinine Creatinine   Date Value Ref Range Status   12/06/2024 3.42 (H) 0.57 - 1.00 mg/dL Final   12/05/2024 3.01 (H) 0.57 - 1.00 mg/dL Final   12/04/2024 2.33 (H) 0.57 - 1.00 mg/dL Final      Calcium Calcium   Date Value Ref Range Status   12/06/2024 9.1 8.6 - 10.5 mg/dL Final   12/05/2024 9.2 8.6 - 10.5 mg/dL Final   12/04/2024 8.8 8.6 - 10.5 mg/dL Final      PO4 No results found for: \"CAPO4\"   Albumin Albumin   Date Value Ref Range Status   12/06/2024 3.2 (L) 3.5 - 5.2 g/dL Final   12/05/2024 3.0 (L) 3.5 - 5.2 g/dL Final   12/04/2024 3.2 (L) 3.5 - 5.2 g/dL Final      Magnesium Magnesium   Date Value Ref Range Status   12/06/2024 1.9 1.6 - 2.4 mg/dL Final   12/05/2024 1.9 1.6 - 2.4 mg/dL Final   12/04/2024 1.8 1.6 - 2.4 mg/dL Final      Uric Acid No results found for: \"URICACID\"        Results Review:     I reviewed the patient's new clinical results.    apixaban, 5 mg, Oral, BID  atorvastatin, 10 mg, Oral, Daily  cholestyramine light, 1 packet, Oral, Q24H  doxycycline, 100 mg, Oral, Q12H  DULoxetine, 20 mg, Oral, Daily  epoetin rylee/rylee-epbx, 20,000 Units, Subcutaneous, Weekly  famotidine, 20 mg, Oral, Daily  ferric " gluconate, 125 mg, Intravenous, Daily  furosemide, 80 mg, Oral, BID  hydrOXYzine, 25 mg, Oral, Nightly  levothyroxine, 50 mcg, Oral, Q AM  metoprolol tartrate, 12.5 mg, Oral, Q12H  midodrine, 15 mg, Oral, TID AC  mirtazapine, 15 mg, Oral, Nightly  nystatin, , Topical, Q12H  predniSONE, 5 mg, Oral, Daily With Breakfast  QUEtiapine, 25 mg, Oral, Nightly  sildenafil, 10 mg, Oral, TID  sodium chloride, 10 mL, Intravenous, Q12H  sodium chloride, 10 mL, Intravenous, Q12H  sodium chloride, 10 mL, Intravenous, Q12H  tacrolimus, 1 mg, Oral, BID             Medication Review: Reviewed    Assessment & Plan     1) Kidney Transplant - On Prograf  2) TONYA - HARRY/ATN, Cr bumped to 3.4 from 4.  Baseline under 1.  3) DVT  4) Hypotension  5) Anemia    Plan: Cr up to 3.4 from 3 = continued HARRY/ATN.  Lytes OK. Monitor another day for any evidence of stabilizing function but discussed possible need for HD over the weekend (patient hs RUE AVF with recent fistulogram). Midodrine for BP support.  Will give an increased dose of Lasix x 2 today.  Transplant US with no hydro and no arterial or venous stenosis.  Prograf level was 3.4 on 11/30 and jumped to 15.2 on 12/3.  Hold PM dose today and decrease to 0.5 mg BID.  Repeat level tomorrow AM.  On AC and Vascular following.  S/P fistulogram.  Hgb 7.9.  Repleting iron.  ABX per primary.  Pulm following as well.  Palliative following.       Naun Falcon MD  [facility]  12/06/24  08:26 EST

## 2024-12-06 NOTE — PLAN OF CARE
Bed mobility - Max-A and Assist x 2 for supine to sit with drawsheet. modA for sit to supine  Transfers - sit to stand with Max-A and Assist x 2 with arm in arm assist and drawsheet around posterior pelvis to assist with lifting and hip extension. Completed from HOB elevated position. Requires 2 attempts to stand. Tolerates standing ~10 secs, limited by fatigue.   Sitting balance EOB with SBA today, improved upright posture and no lateral LOB.   Static standing balance with min/modA x2p with arm in arm assist.     Anticipated Discharge Disposition (PT): skilled nursing facility

## 2024-12-06 NOTE — PLAN OF CARE
Goal Outcome Evaluation:      Patient had a pleasant night though c/o pain which was controlled by PRN pain meds with effectiveness. Patient hasn't made urine throughout this shift. Bladder scanned patient at this time which found 162cc of urine. Safety measures in place.

## 2024-12-06 NOTE — PROGRESS NOTES
[facility] MEDICINE SERVICE  DAILY PROGRESS NOTE    NAME: Jaja Carcamo  : 1945  MRN: [id number]      LOS: 6 days     PROVIDER OF SERVICE: Khris Tomas MD    Chief Complaint: Acute deep vein thrombosis (DVT) of other vein of right upper extremity    Subjective:     Interval History:  History taken from: patient    History of Present Illness: Jaja Carcamo is a 79 y.o. female with a CMH of atrial flutter, COPD, previous history of ESRD now with renal transplant, hypertension who presents to the hospital with complaints of right arm swelling and pain.  The patient is a poor historian but was able to tell me that she has an AV fistula on her right arm for previous history of ESRD on dialysis.  However she received a renal transplant a few years ago and has not required hemodialysis since then.  She did notice that over the past few weeks she has had increasing right upper extremity pain and swelling that is extending all the way up to her right shoulder.  She is complaining of some enlargement of veins in her right upper arm and shoulder area.  She also complains of pain in that area.  She denies any shortness of breath, cough, congestion, fevers, chills.  RUE ultrasound in the ER did show extensive DVT of the right internal jugular vein.       seen in bed LINNEA, VSS, DW RN,   examined in bed acute distress, dyspnea on 8 L of oxygen, patient with significant edema left right upper extremity.  Poor access, ordered PICC team for line placement.  Discussed with RN.  24 patient seen in bed no acute distress, dyspnea on 8 L, patient with hypotension.  Will increase midodrine, received fluid bolus.  Will give albumin.  Discussed with RN.  12/3/24 seen in bed NAD, BP 94/56. Wbc 16, underwent:Right arm fistulogram  Central venogram  Balloon angioplasty of right cephalic vein with 7 mm, 10 mm, and 12 mm angioplasty balloons  Balloon angioplasty of right subclavian vein with 7mm, 10mm, and  12mm angioplasty balloons3\  12/4/2024 patient seen and examined in bed acute distress, assessment #2 confusion are normal.  Heart rate 125.  Patient with fatigue weakness, dyspnea on 5 L.  Palliative care consulted.  12/5/24 patient seen in bed NAD, doing better today, will change anticoagulation to po, and antibiotics to po,   12/6/24 seen in bed NAD, c/o nausea, BP stable, DW RN creat 3.4    Review of Systems  10 point ROS note except for above  Objective:     Vital Signs  Temp:  [97.1 °F (36.2 °C)-97.9 °F (36.6 °C)] 97.1 °F (36.2 °C)  Heart Rate:  [106-118] 109  Resp:  [13-19] 16  BP: (109-134)/(57-78) 134/78  Flow (L/min) (Oxygen Therapy):  [5] 5   Body mass index is 29.05 kg/m².    Physical Exam  General Appearance:  Alert, cooperative, no distress, appears stated age  Head:   Normocephalic, without obvious abnormality, atraumatic  Eyes:   PERRL, conjunctiva/corneas clear, EOM's intact, fundi benign, both eyes  Ears:    Normal TM's and external ear canals, both ears  Nose:Nares normal, septum midline, mucosa normal, no drainage or sinus tenderness  Throat:Lips, mucosa, and tongue normal; teeth and gums normal  Neck:Supple, symmetrical, trachea midline, no adenopathy, thyroid: not enlarged, symmetric, no tenderness/mass/nodules, no carotid bruit or JVD  Lungs:             Clear to auscultation bilaterally, respirations unlabored  Heart:  Regular rate and rhythm, S1, S2 normal, no murmur, rub or gallop  Abdomen:  Soft, non-tender, bowel sounds active all four quadrants,  no masses, no organomegaly  Extremities:RUE nonpitting edema with nonfunctioning AV fistula, enlargement of right upper arm veins  Pulses:2+ and symmetric  Skin:Skin color, texture, turgor normal, no rashes or lesions  Neurologic: Normal     Diagnostic Data    Results from last 7 days   Lab Units 12/06/24  0048 12/03/24  0051 12/02/24  0018   WBC 10*3/mm3 12.07*   < > 12.86*   HEMOGLOBIN g/dL 7.9*   < > 8.1*   HEMATOCRIT % 27.9*   < > 28.5*    PLATELETS 10*3/mm3 180   < > 200   GLUCOSE mg/dL 130*   < > 81   CREATININE mg/dL 3.42*   < > 1.08*   BUN mg/dL 40*   < > 24*   SODIUM mmol/L 128*   < > 137   POTASSIUM mmol/L 5.1   < > 4.7   AST (SGOT) U/L  --   --  18   ALT (SGPT) U/L  --   --  5   ALK PHOS U/L  --   --  60   BILIRUBIN mg/dL  --   --  0.6   ANION GAP mmol/L 10.7   < > 8.5    < > = values in this interval not displayed.       No radiology results for the last day      I reviewed the patient's new clinical results.    Assessment/Plan:     Active and Resolved Problems  Active Hospital Problems    Diagnosis  POA    **Acute deep vein thrombosis (DVT) of other vein of right upper extremity [I82.621]  Yes    Acute DVT (deep venous thrombosis) [I82.409]  Yes      Resolved Hospital Problems   No resolved problems to display.      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    TONYA - HARRY/ATN, Cr bumped to 3.4 from 4.  Baseline under 1.   Per renal:continued HARRY/ATN.  Lytes OK. Monitor another day for any evidence of stabilizing function but discussed possible need for HD over the weekend (patient hs RUE AVF with recent fistulogram). Midodrine for BP support.  Will give an increased dose of Lasix x 2 today.  Transplant US with no hydro and no arterial or venous stenosis.  Prograf level was 3.4 on 11/30 and jumped to 15.2 on 12/3.  Hold PM dose today and decrease to 0.5 mg BID.  Repeat level tomorrow AM.  On AC and Vascular following.  S/P fistulogram.  Hgb 7.9.  Repleting iron.      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  -Nephrology consult     Hypothyroidism  -Continue Synthroid    VTE Prophylaxis:  Pharmacologic VTE prophylaxis orders are present.    Disposition Planning:   Barriers to Discharge:dvt  Anticipated Date of Discharge: 12/3  Place of Discharge: home      Time: 35 minutes     There are no questions and answers to display.       Signature: Electronically signed by Khris Tomas MD, 12/06/24, 12:34 EST.  [facility] Hospitalist Team

## 2024-12-06 NOTE — THERAPY TREATMENT NOTE
"Subjective: Pt agreeable to therapeutic plan of care.     Objective:     Precautions - tachycardia    Bed mobility - Max-A and Assist x 2 for supine to sit with drawsheet. modA for sit to supine  Transfers - sit to stand with Max-A and Assist x 2 with arm in arm assist and drawsheet around posterior pelvis to assist with lifting and hip extension. Completed from HOB elevated position. Requires 2 attempts to stand. Tolerates standing ~10 secs, limited by fatigue.   Sitting balance EOB with SBA today, improved upright posture and no lateral LOB.   Static standing balance with min/modA x2p with arm in arm assist.       Vitals: Tachycardic  with activity    Pain:  4 FACES  Location: LLE   Intervention for pain: Increased Activity    Education: Provided education on the importance of mobility in the acute care setting, Verbal/Tactile Cues, and Transfer Training    Assessment: Jaja Carcamo presents with endurance, strength, and functional mobility impairments which indicate the need for skilled intervention. Improved sitting balance and posture at EOB today. Also, able to progress to standing trial.. Pt was very happy  to stand but then was exhausted and required quick return to bed. Pt may be reading for 2 person assist with Mayra Steady for bed to chair transfer next session. Tolerating session today without incident. Will continue to follow and progress as tolerated.     Plan/Recommendations:   If medically appropriate, Moderate Intensity Therapy recommended post-acute care. This is recommended as therapy feels the patient would require 3-4 days per week and wouldn't tolerate \"3 hour daily\" rehab intensity. SNF would be the preferred choice. If the patient does not agree to SNF, arrange HH or OP depending on home bound status. If patient is medically complex, consider LTACH. Pt requires no DME at discharge.     Pt desires Skilled Rehab placement at discharge. Pt cooperative; agreeable to therapeutic " recommendations and plan of care.           Post-Tx Position: Supine with HOB Elevated, Alarms activated, and Call light and personal items within reach  PPE: gloves and gown    Therapy Charges for Today       Code Description Service Date Service Provider Modifiers Qty    28243816543  PT THERAPEUTIC ACT EA 15 MIN 12/5/2024 Alicja Veras, PT GP 1    91971763655 HC PT EVAL MOD COMPLEXITY 4 12/5/2024 Alicja Veras, PT GP 1    [id number]  PT THERAPEUTIC ACT EA 15 MIN 12/6/2024 Alicja Veras, PT GP 1    [id number]  PT NEUROMUSC RE EDUCATION EA 15 MIN 12/6/2024 Alicja Veras, PT GP 1           PT Charges       Row Name 12/06/24 1614             Time Calculation    Start Time 1425  -      Stop Time 1442  -      Time Calculation (min) 17 min  -      PT Non-Billable Time (min) 0 min  -      PT Received On 12/06/24  -      PT - Next Appointment 12/08/24  -         Time Calculation- PT    Total Timed Code Minutes- PT 17 minute(s)  -                User Key  (r) = Recorded By, (t) = Taken By, (c) = Cosigned By      Initials Name Provider Type     Alicja Veras, PT Physical Therapist

## 2024-12-06 NOTE — PLAN OF CARE
"Goal Outcome Evaluation:                 Assessment: Jaja Carcamo presents with ADL impairments affecting function including balance, cognition, endurance / activity tolerance, pain, range of motion (ROM), shortness of breath, and strength. Demonstrated functioning below baseline abilities indicate the need for continued skilled intervention while inpatient. Tolerating session today without incident. Will continue to follow and progress as tolerated.     Plan/Recommendations:   Moderate Intensity Therapy recommended post-acute care. This is recommended as therapy feels the patient would require 3-4 days per week and wouldn't tolerate \"3 hour daily\" rehab intensity. SNF would be the preferred choice. If the patient does not agree to SNF, arrange HH or OP depending on home bound status. If patient is medically complex, consider LTACH.. Pt requires no DME at discharge.                            "

## 2024-12-06 NOTE — PROGRESS NOTES
Daily Progress Note        Acute deep vein thrombosis (DVT) of other vein of right upper extremity    Acute DVT (deep venous thrombosis)    Assessment:     Hypoxic respiratory insufficiency multifactorial   negative respiratory panel     right IJ DVT, brachial artery aneurysmal dilation/AV fistula  No PE on CAT scan  Pulm edema with bilateral pleural effusion left > right  A-fib with RVR was on Eliquis as an outpatient     Pulmonary hypertension  2D echo 10/4/2024  EF 58 % (Biplane Amato's method).   Severe biatrial enlargement.  moderate mitral regurgitation. moderate tricuspid regurgitation.   Calculated pulmonary systolic pressure of 72 mmHg.   Dilated inferior vena cava consistent with a mean right atrial pressure of 15  mmHg.      ? 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 difficile 6/7/2024  History of ESBL E. coli and 6/5/2024  Hypothyroidism  CAD  History of DVT     Pulmonary hypertension  HTN        Recommendations:     Oxygen titration currently on 5 L    Cont Low-dose Revatio 10 mg 3 times daily   Blood pressure decreased midodrine 10 mg 3 times daily started we will continue to monitor if blood pressure does not improve will stop sildenafil  No nitrates     Diuresis by renal  Immunosuppressive agents Prograf for renal transplant monitoring level     Anticoagulation on full dose IV heparin     Empiric antibiotics currently on IV Zosyn    Avoid sedatives           Chest x-ray 12/1/2024                  LOS: 6 days     Subjective         Objective     Vital signs for last 24 hours:  Vitals:    12/05/24 2035 12/06/24 0050 12/06/24 0355 12/06/24 0807   BP: 109/65 130/76 120/63 123/70   BP Location: Left arm Left arm Left arm Left arm   Patient Position: Lying Lying Lying Lying   Pulse: 111 113 118 106   Resp: 19 15 16 13   Temp: 97.8 °F (36.6 °C) 97.9 °F (36.6 °C) 97.6 °F (36.4 °C) 97.6 °F (36.4 °C)   TempSrc: Axillary Oral Oral  Tympanic   SpO2: 100% 94% 100% 93%   Weight:       Height:           Intake/Output last 3 shifts:  I/O last 3 completed shifts:  In: 360 [P.O.:360]  Out: 300 [Urine:300]  Intake/Output this shift:  No intake/output data recorded.      Radiology  Imaging Results (Last 24 Hours)       ** No results found for the last 24 hours. **            Labs:  Results from last 7 days   Lab Units 12/06/24  0048   WBC 10*3/mm3 12.07*   HEMOGLOBIN g/dL 7.9*   HEMATOCRIT % 27.9*   PLATELETS 10*3/mm3 180     Results from last 7 days   Lab Units 12/06/24  0048 12/03/24  0051 12/02/24  0018   SODIUM mmol/L 128*   < > 137   POTASSIUM mmol/L 5.1   < > 4.7   CHLORIDE mmol/L 97*   < > 101   CO2 mmol/L 20.3*   < > 27.5   BUN mg/dL 40*   < > 24*   CREATININE mg/dL 3.42*   < > 1.08*   CALCIUM mg/dL 9.1   < > 8.6   BILIRUBIN mg/dL  --   --  0.6   ALK PHOS U/L  --   --  60   ALT (SGPT) U/L  --   --  5   AST (SGOT) U/L  --   --  18   GLUCOSE mg/dL 130*   < > 81    < > = values in this interval not displayed.     Results from last 7 days   Lab Units 12/01/24  1550   PH, ARTERIAL pH units 7.326*   PO2 ART mm Hg 58.5*   PCO2, ARTERIAL mm Hg 55.4*   HCO3 ART mmol/L 29.0*     Results from last 7 days   Lab Units 12/06/24  0048 12/05/24  0602 12/04/24  0429   ALBUMIN g/dL 3.2* 3.0* 3.2*             Results from last 7 days   Lab Units 12/06/24  0048   MAGNESIUM mg/dL 1.9     Results from last 7 days   Lab Units 12/06/24  0048 12/05/24  1214 12/05/24  0602 12/02/24  0747 12/02/24  0018 12/01/24  1457 12/01/24  0557 11/30/24  0705 11/30/24  0027   INR   --   --   --   --  1.76*  --  1.85*  --  1.91*   APTT seconds 38.7* 172.8* 113.5*   < > 90.0*   < > 56.5*   < > 47.5*    < > = values in this interval not displayed.     Results from last 7 days   Lab Units 11/29/24  1307   TSH uIU/mL 17.200*           Meds:   SCHEDULE  apixaban, 5 mg, Oral, BID  atorvastatin, 10 mg, Oral, Daily  cholestyramine light, 1 packet, Oral, Q24H  doxycycline, 100 mg, Oral,  Q12H  DULoxetine, 20 mg, Oral, Daily  epoetin rylee/rylee-epbx, 20,000 Units, Subcutaneous, Weekly  famotidine, 20 mg, Oral, Daily  ferric gluconate, 125 mg, Intravenous, Daily  furosemide, 80 mg, Oral, BID  hydrOXYzine, 25 mg, Oral, Nightly  levothyroxine, 50 mcg, Oral, Q AM  metoprolol tartrate, 12.5 mg, Oral, Q12H  midodrine, 15 mg, Oral, TID AC  mirtazapine, 15 mg, Oral, Nightly  nystatin, , Topical, Q12H  predniSONE, 5 mg, Oral, Daily With Breakfast  QUEtiapine, 25 mg, Oral, Nightly  sildenafil, 10 mg, Oral, TID  sodium chloride, 10 mL, Intravenous, Q12H  sodium chloride, 10 mL, Intravenous, Q12H  sodium chloride, 10 mL, Intravenous, Q12H  [START ON 12/7/2024] tacrolimus, 0.5 mg, Oral, BID      Infusions       PRNs    acetaminophen    senna-docusate sodium **AND** polyethylene glycol **AND** bisacodyl **AND** bisacodyl    diazePAM    HYDROcodone-acetaminophen    influenza vaccine    LORazepam    ondansetron    prochlorperazine    [COMPLETED] Insert Peripheral IV **AND** sodium chloride    sodium chloride    sodium chloride    sodium chloride    sodium chloride    Physical Exam:  Physical Exam  Cardiovascular:      Heart sounds: Murmur heard.      No gallop.   Pulmonary:      Effort: No respiratory distress.      Breath sounds: No stridor. Rhonchi and rales present. No wheezing.   Chest:      Chest wall: No tenderness.         ROS  Review of Systems   Respiratory:  Positive for cough and shortness of breath. Negative for wheezing and stridor.    Cardiovascular:  Positive for chest pain, palpitations and leg swelling.             Total time spent with patient greater than: 45 Minutes

## 2024-12-06 NOTE — THERAPY TREATMENT NOTE
"Subjective: Pt agreeable to therapeutic plan of care.  Cognition: arousal/alertness: Alert, Attentive, and Confused    Objective:     Bed Mobility: Max-A and Assist x 2   Functional Transfers: Max-A and Assist x 2     Balance: sitting EOB CGA  Functional Ambulation: N/A or Not attempted.    Grooming: Max-A  ADL Position: edge of bed sitting  ADL Comments: Brushing hair     Vitals: WNL    Pain: 4 VAS  Location: FACES  Interventions for pain: Repositioned  Education: Provided education on the importance of mobility in the acute care setting      Assessment: Jaja Carcamo presents with ADL impairments affecting function including balance, cognition, endurance / activity tolerance, pain, range of motion (ROM), shortness of breath, and strength. Demonstrated functioning below baseline abilities indicate the need for continued skilled intervention while inpatient. Tolerating session today without incident. Will continue to follow and progress as tolerated.     Plan/Recommendations:   Moderate Intensity Therapy recommended post-acute care. This is recommended as therapy feels the patient would require 3-4 days per week and wouldn't tolerate \"3 hour daily\" rehab intensity. SNF would be the preferred choice. If the patient does not agree to SNF, arrange HH or OP depending on home bound status. If patient is medically complex, consider LTACH.. Pt requires no DME at discharge.     Pt desires Skilled Rehab placement at discharge. Pt cooperative; agreeable to therapeutic recommendations and plan of care.     Modified [place]: N/A = No pre-op stroke/TIA    Post-Tx Position: Supine with HOB Elevated, Alarms activated, and Call light and personal items within reach  PPE: gloves    Therapy Charges for Today       Code Description Service Date Service Provider Modifiers Qty    [id number]  OT THERAPEUTIC ACT EA 15 MIN 12/6/2024 Ida Herndon, OT GO 1    [id number]  OT SELF CARE/MGMT/TRAIN EA 15 MIN 12/6/2024 Yanick" Ida ANNA OT GO 1           Time Calculation- OT       Row Name 12/06/24 1739             Time Calculation- OT    OT Start Time 1425  -SR      OT Stop Time 1445  -SR      OT Time Calculation (min) 20 min  -SR      Total Timed Code Minutes- OT 20 minute(s)  -SR      OT Received On 12/06/24  -SR      OT - Next Appointment 12/09/24  -SR                User Key  (r) = Recorded By, (t) = Taken By, (c) = Cosigned By      Initials Name Provider Type    SR Ida Herndon, OT Occupational Therapist

## 2024-12-06 NOTE — PLAN OF CARE
Goal Outcome Evaluation:         Pt not producing very much urine. 0 ml on this shift. Pt confused and forgetful at times. Pt condition ongoing.

## 2024-12-07 LAB
ALBUMIN SERPL-MCNC: 3.1 G/DL (ref 3.5–5.2)
ANION GAP SERPL CALCULATED.3IONS-SCNC: 12.3 MMOL/L (ref 5–15)
APTT PPP: 46.3 SECONDS (ref 22.7–35.4)
BACTERIA SPEC AEROBE CULT: NORMAL
BACTERIA SPEC AEROBE CULT: NORMAL
BUN SERPL-MCNC: 44 MG/DL (ref 8–23)
BUN/CREAT SERPL: 12 (ref 7–25)
CALCIUM SPEC-SCNC: 9.2 MG/DL (ref 8.6–10.5)
CHLORIDE SERPL-SCNC: 95 MMOL/L (ref 98–107)
CO2 SERPL-SCNC: 19.7 MMOL/L (ref 22–29)
CREAT SERPL-MCNC: 3.66 MG/DL (ref 0.57–1)
EGFRCR SERPLBLD CKD-EPI 2021: 12.3 ML/MIN/1.73
GLUCOSE SERPL-MCNC: 101 MG/DL (ref 65–99)
PHOSPHATE SERPL-MCNC: 5.5 MG/DL (ref 2.5–4.5)
POTASSIUM SERPL-SCNC: 5 MMOL/L (ref 3.5–5.2)
SODIUM SERPL-SCNC: 127 MMOL/L (ref 136–145)

## 2024-12-07 PROCEDURE — 63710000001 TACROLIMUS PER 1 MG: Performed by: INTERNAL MEDICINE

## 2024-12-07 PROCEDURE — 63710000001 PREDNISONE PER 5 MG: Performed by: STUDENT IN AN ORGANIZED HEALTH CARE EDUCATION/TRAINING PROGRAM

## 2024-12-07 PROCEDURE — 80197 ASSAY OF TACROLIMUS: CPT | Performed by: INTERNAL MEDICINE

## 2024-12-07 PROCEDURE — 25010000002 NA FERRIC GLUC CPLX PER 12.5 MG: Performed by: INTERNAL MEDICINE

## 2024-12-07 PROCEDURE — 80069 RENAL FUNCTION PANEL: CPT | Performed by: STUDENT IN AN ORGANIZED HEALTH CARE EDUCATION/TRAINING PROGRAM

## 2024-12-07 PROCEDURE — 85730 THROMBOPLASTIN TIME PARTIAL: CPT | Performed by: STUDENT IN AN ORGANIZED HEALTH CARE EDUCATION/TRAINING PROGRAM

## 2024-12-07 RX ORDER — FAMOTIDINE 20 MG/1
20 TABLET, FILM COATED ORAL EVERY OTHER DAY
Status: DISCONTINUED | OUTPATIENT
Start: 2024-12-08 | End: 2024-12-19 | Stop reason: HOSPADM

## 2024-12-07 RX ORDER — METOPROLOL TARTRATE 1 MG/ML
5 INJECTION, SOLUTION INTRAVENOUS
Status: DISCONTINUED | OUTPATIENT
Start: 2024-12-07 | End: 2024-12-19 | Stop reason: HOSPADM

## 2024-12-07 RX ADMIN — APIXABAN 5 MG: 5 TABLET, FILM COATED ORAL at 08:31

## 2024-12-07 RX ADMIN — DULOXETINE HYDROCHLORIDE 20 MG: 20 CAPSULE, DELAYED RELEASE ORAL at 08:33

## 2024-12-07 RX ADMIN — Medication 10 ML: at 08:27

## 2024-12-07 RX ADMIN — MIDODRINE HYDROCHLORIDE 15 MG: 5 TABLET ORAL at 08:53

## 2024-12-07 RX ADMIN — LEVOTHYROXINE SODIUM 50 MCG: 50 TABLET ORAL at 05:47

## 2024-12-07 RX ADMIN — MIDODRINE HYDROCHLORIDE 15 MG: 5 TABLET ORAL at 16:58

## 2024-12-07 RX ADMIN — PREDNISONE 5 MG: 5 TABLET ORAL at 08:31

## 2024-12-07 RX ADMIN — MIDODRINE HYDROCHLORIDE 15 MG: 5 TABLET ORAL at 11:52

## 2024-12-07 RX ADMIN — HYDROCODONE BITARTRATE AND ACETAMINOPHEN 1 TABLET: 7.5; 325 TABLET ORAL at 05:53

## 2024-12-07 RX ADMIN — SILDENAFIL 10 MG: 20 TABLET ORAL at 08:32

## 2024-12-07 RX ADMIN — DOXYCYCLINE 100 MG: 100 CAPSULE ORAL at 08:31

## 2024-12-07 RX ADMIN — Medication 10 ML: at 20:05

## 2024-12-07 RX ADMIN — QUETIAPINE FUMARATE 25 MG: 25 TABLET ORAL at 20:04

## 2024-12-07 RX ADMIN — SILDENAFIL 10 MG: 20 TABLET ORAL at 20:04

## 2024-12-07 RX ADMIN — NYSTATIN: 100000 POWDER TOPICAL at 20:05

## 2024-12-07 RX ADMIN — MIRTAZAPINE 15 MG: 15 TABLET, FILM COATED ORAL at 20:04

## 2024-12-07 RX ADMIN — Medication 12.5 MG: at 20:05

## 2024-12-07 RX ADMIN — CHOLESTYRAMINE 4 G: 4 POWDER, FOR SUSPENSION ORAL at 09:10

## 2024-12-07 RX ADMIN — HYDROXYZINE HYDROCHLORIDE 25 MG: 25 TABLET ORAL at 20:04

## 2024-12-07 RX ADMIN — TACROLIMUS 0.5 MG: 0.5 CAPSULE ORAL at 20:05

## 2024-12-07 RX ADMIN — SODIUM CHLORIDE 125 MG: 9 INJECTION, SOLUTION INTRAVENOUS at 08:26

## 2024-12-07 RX ADMIN — NYSTATIN: 100000 POWDER TOPICAL at 08:53

## 2024-12-07 RX ADMIN — Medication 12.5 MG: at 08:31

## 2024-12-07 RX ADMIN — SILDENAFIL 10 MG: 20 TABLET ORAL at 16:58

## 2024-12-07 RX ADMIN — HYDROCODONE BITARTRATE AND ACETAMINOPHEN 1 TABLET: 7.5; 325 TABLET ORAL at 22:54

## 2024-12-07 RX ADMIN — HYDROCODONE BITARTRATE AND ACETAMINOPHEN 1 TABLET: 7.5; 325 TABLET ORAL at 14:17

## 2024-12-07 RX ADMIN — DOXYCYCLINE 100 MG: 100 CAPSULE ORAL at 20:04

## 2024-12-07 RX ADMIN — TACROLIMUS 0.5 MG: 0.5 CAPSULE ORAL at 08:33

## 2024-12-07 RX ADMIN — ATORVASTATIN CALCIUM 10 MG: 10 TABLET ORAL at 08:31

## 2024-12-07 RX ADMIN — APIXABAN 5 MG: 5 TABLET, FILM COATED ORAL at 20:04

## 2024-12-07 NOTE — PROGRESS NOTES
Daily Progress Note          Assessment  Hypoxic respiratory insufficiency multifactorial   negative respiratory panel      right IJ DVT, brachial artery aneurysmal dilation/AV fistula  No PE on CAT scan  Pulm edema with bilateral pleural effusion left > right  A-fib with RVR was on Eliquis as an outpatient     Pulmonary hypertension  2D echo 10/4/2024  EF 58 % (Biplane Amato's method).   Severe biatrial enlargement.  moderate mitral regurgitation. moderate tricuspid regurgitation.   Calculated pulmonary systolic pressure of 72 mmHg.   Dilated inferior vena cava consistent with a mean right atrial pressure of 15  mmHg.      ? 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 difficile 6/7/2024  History of ESBL E. coli and 6/5/2024  Hypothyroidism  CAD  History of DVT     Pulmonary hypertension  HTN        Recommendations:     Oxygen supplement and titration to maintain saturation 90-95%: Currently on 2 L per nasal cannula     Cont Low-dose Revatio 10 mg 3 times daily   Blood pressure decreased midodrine 15 mg 3 times daily started we will continue to monitor if blood pressure   No nitrates     Diuresis by nephrology  Immunosuppressive agents Prograf for renal transplant monitoring level     Anticoagulation: Eliquis     Empiric antibiotics completed IV Zosyn     Avoid sedatives            Chest x-ray 12/1/2024                    LOS: 7 days     Subjective     Gradual improvement in cough and shortness of breath    Objective     Vital signs for last 24 hours:  Vitals:    12/07/24 0407 12/07/24 0419 12/07/24 0604 12/07/24 0822   BP: 113/56   116/65   BP Location: Left arm   Left arm   Patient Position: Lying   Lying   Pulse: 114 114  117   Resp: 17   20   Temp: 97.6 °F (36.4 °C)   97.5 °F (36.4 °C)   TempSrc: Axillary   Oral   SpO2: 100% 100% 100% 100%   Weight:       Height:           Intake/Output last 3 shifts:  No intake/output data  recorded.  Intake/Output this shift:  No intake/output data recorded.      Radiology  Imaging Results (Last 24 Hours)       ** No results found for the last 24 hours. **            Labs:  Results from last 7 days   Lab Units 12/06/24  0048   WBC 10*3/mm3 12.07*   HEMOGLOBIN g/dL 7.9*   HEMATOCRIT % 27.9*   PLATELETS 10*3/mm3 180     Results from last 7 days   Lab Units 12/07/24  0352 12/03/24  0051 12/02/24  0018   SODIUM mmol/L 127*   < > 137   POTASSIUM mmol/L 5.0   < > 4.7   CHLORIDE mmol/L 95*   < > 101   CO2 mmol/L 19.7*   < > 27.5   BUN mg/dL 44*   < > 24*   CREATININE mg/dL 3.66*   < > 1.08*   CALCIUM mg/dL 9.2   < > 8.6   BILIRUBIN mg/dL  --   --  0.6   ALK PHOS U/L  --   --  60   ALT (SGPT) U/L  --   --  5   AST (SGOT) U/L  --   --  18   GLUCOSE mg/dL 101*   < > 81    < > = values in this interval not displayed.     Results from last 7 days   Lab Units 12/01/24  1550   PH, ARTERIAL pH units 7.326*   PO2 ART mm Hg 58.5*   PCO2, ARTERIAL mm Hg 55.4*   HCO3 ART mmol/L 29.0*     Results from last 7 days   Lab Units 12/07/24  0352 12/06/24  0048 12/05/24  0602   ALBUMIN g/dL 3.1* 3.2* 3.0*             Results from last 7 days   Lab Units 12/06/24  0048   MAGNESIUM mg/dL 1.9     Results from last 7 days   Lab Units 12/07/24  0352 12/06/24  0048 12/05/24  1214 12/02/24  0747 12/02/24  0018 12/01/24  1457 12/01/24  0557   INR   --   --   --   --  1.76*  --  1.85*   APTT seconds 46.3* 38.7* 172.8*   < > 90.0*   < > 56.5*    < > = values in this interval not displayed.               Meds:   SCHEDULE  apixaban, 5 mg, Oral, BID  atorvastatin, 10 mg, Oral, Daily  cholestyramine light, 1 packet, Oral, Q24H  doxycycline, 100 mg, Oral, Q12H  DULoxetine, 20 mg, Oral, Daily  epoetin rylee/rylee-epbx, 20,000 Units, Subcutaneous, Weekly  [START ON 12/8/2024] famotidine, 20 mg, Oral, Every Other Day  ferric gluconate, 125 mg, Intravenous, Daily  hydrOXYzine, 25 mg, Oral, Nightly  levothyroxine, 50 mcg, Oral, Q AM  metoprolol  tartrate, 12.5 mg, Oral, Q12H  midodrine, 15 mg, Oral, TID AC  mirtazapine, 15 mg, Oral, Nightly  nystatin, , Topical, Q12H  predniSONE, 5 mg, Oral, Daily With Breakfast  QUEtiapine, 25 mg, Oral, Nightly  sildenafil, 10 mg, Oral, TID  sodium chloride, 10 mL, Intravenous, Q12H  sodium chloride, 10 mL, Intravenous, Q12H  sodium chloride, 10 mL, Intravenous, Q12H  tacrolimus, 0.5 mg, Oral, BID      Infusions     PRNs    acetaminophen    senna-docusate sodium **AND** polyethylene glycol **AND** bisacodyl **AND** bisacodyl    diazePAM    HYDROcodone-acetaminophen    influenza vaccine    LORazepam    ondansetron    prochlorperazine    [COMPLETED] Insert Peripheral IV **AND** sodium chloride    sodium chloride    sodium chloride    sodium chloride    sodium chloride    Physical Exam:  General Appearance:  Alert   HEENT:  Normocephalic, without obvious abnormality, Conjunctiva/corneas clear,.   Nares normal, no drainage     Neck:  Supple, symmetrical, trachea midline.   Lungs /Chest wall:   Bilateral basal rhonchi, respirations unlabored, symmetrical wall movement.     Heart:  Regular rate and rhythm, S1 S2 normal  Abdomen: Soft, non-tender, no masses, no organomegaly.    Extremities: No edema, no clubbing or cyanosis     ROS  Constitutional: Negative for chills, fever and malaise/fatigue.   HENT: Negative.    Eyes: Negative.    Cardiovascular: Negative.    Respiratory: Positive for cough and shortness of breath.    Skin: Negative.    Musculoskeletal: Negative.    Gastrointestinal: Negative.    Genitourinary: Negative.    Neurological: Negative.    Psychiatric/Behavioral: Negative.      I reviewed the recent clinical results  I personally reviewed the latest radiological studies    Part of this note may be an electronic transcription/translation of spoken language to printed text using the Dragon Dictation System.

## 2024-12-07 NOTE — PLAN OF CARE
Goal Outcome Evaluation:  Plan of Care Reviewed With: patient        Progress: improving  Outcome Evaluation: Patient alert and oriented this shift. Oxygen weaned to 2L HF. Patient remains with no urine output. Bladder scan showed 185. HR elevated and irregular. Edema remains generalized. Patient updated on plan of care.

## 2024-12-07 NOTE — PROGRESS NOTES
[facility] MEDICINE SERVICE  DAILY PROGRESS NOTE    NAME: Jaja Carcamo  : 1945  MRN: [id number]      LOS: 7 days     PROVIDER OF SERVICE: Khris Tomas MD    Chief Complaint: Acute deep vein thrombosis (DVT) of other vein of right upper extremity    Subjective:     Interval History:  History taken from: patient    History of Present Illness: Jaja Carcamo is a 79 y.o. female with a CMH of atrial flutter, COPD, previous history of ESRD now with renal transplant, hypertension who presents to the hospital with complaints of right arm swelling and pain.  The patient is a poor historian but was able to tell me that she has an AV fistula on her right arm for previous history of ESRD on dialysis.  However she received a renal transplant a few years ago and has not required hemodialysis since then.  She did notice that over the past few weeks she has had increasing right upper extremity pain and swelling that is extending all the way up to her right shoulder.  She is complaining of some enlargement of veins in her right upper arm and shoulder area.  She also complains of pain in that area.  She denies any shortness of breath, cough, congestion, fevers, chills.  RUE ultrasound in the ER did show extensive DVT of the right internal jugular vein.       seen in bed LINNEA, VSS, DW RN,   examined in bed acute distress, dyspnea on 8 L of oxygen, patient with significant edema left right upper extremity.  Poor access, ordered PICC team for line placement.  Discussed with RN.  24 patient seen in bed no acute distress, dyspnea on 8 L, patient with hypotension.  Will increase midodrine, received fluid bolus.  Will give albumin.  Discussed with RN.  12/3/24 seen in bed NAD, BP 94/56. Wbc 16, underwent:Right arm fistulogram  Central venogram  Balloon angioplasty of right cephalic vein with 7 mm, 10 mm, and 12 mm angioplasty balloons  Balloon angioplasty of right subclavian vein with 7mm, 10mm, and  12mm angioplasty balloons3\  12/4/2024 patient seen and examined in bed acute distress, assessment #2 confusion are normal.  Heart rate 125.  Patient with fatigue weakness, dyspnea on 5 L.  Palliative care consulted.  12/5/24 patient seen in bed NAD, doing better today, will change anticoagulation to po, and antibiotics to po,   12/6/24 seen in bed NAD, c/o nausea, BP stable, DW RN creat 3.4  12/7 seen in bed NAD, no new complaints, , creat 3.6    Review of Systems  10 point ROS note except for above  Objective:     Vital Signs  Temp:  [97.1 °F (36.2 °C)-98.1 °F (36.7 °C)] 97.5 °F (36.4 °C)  Heart Rate:  [101-117] 117  Resp:  [16-22] 20  BP: (113-134)/(56-78) 116/65  Flow (L/min) (Oxygen Therapy):  [2-5] 2   Body mass index is 29.05 kg/m².    Physical Exam  General Appearance:  Alert, cooperative, no distress, appears stated age  Head:   Normocephalic, without obvious abnormality, atraumatic  Eyes:   PERRL, conjunctiva/corneas clear, EOM's intact, fundi benign, both eyes  Ears:    Normal TM's and external ear canals, both ears  Nose:Nares normal, septum midline, mucosa normal, no drainage or sinus tenderness  Throat:Lips, mucosa, and tongue normal; teeth and gums normal  Neck:Supple, symmetrical, trachea midline, no adenopathy, thyroid: not enlarged, symmetric, no tenderness/mass/nodules, no carotid bruit or JVD  Lungs:             Clear to auscultation bilaterally, respirations unlabored  Heart:  Regular rate and rhythm, S1, S2 normal, no murmur, rub or gallop  Abdomen:  Soft, non-tender, bowel sounds active all four quadrants,  no masses, no organomegaly  Extremities:RUE nonpitting edema with nonfunctioning AV fistula, enlargement of right upper arm veins  Pulses:2+ and symmetric  Skin:Skin color, texture, turgor normal, no rashes or lesions  Neurologic: Normal     Diagnostic Data    Results from last 7 days   Lab Units 12/07/24  0352 12/06/24  0048 12/03/24  0051 12/02/24  0018   WBC 10*3/mm3  --  12.07*    < > 12.86*   HEMOGLOBIN g/dL  --  7.9*   < > 8.1*   HEMATOCRIT %  --  27.9*   < > 28.5*   PLATELETS 10*3/mm3  --  180   < > 200   GLUCOSE mg/dL 101* 130*   < > 81   CREATININE mg/dL 3.66* 3.42*   < > 1.08*   BUN mg/dL 44* 40*   < > 24*   SODIUM mmol/L 127* 128*   < > 137   POTASSIUM mmol/L 5.0 5.1   < > 4.7   AST (SGOT) U/L  --   --   --  18   ALT (SGPT) U/L  --   --   --  5   ALK PHOS U/L  --   --   --  60   BILIRUBIN mg/dL  --   --   --  0.6   ANION GAP mmol/L 12.3 10.7   < > 8.5    < > = values in this interval not displayed.       No radiology results for the last day      I reviewed the patient's new clinical results.    Assessment/Plan:     Active and Resolved Problems  Active Hospital Problems    Diagnosis  POA    **Acute deep vein thrombosis (DVT) of other vein of right upper extremity [I82.621]  Yes    Acute DVT (deep venous thrombosis) [I82.409]  Yes      Resolved Hospital Problems   No resolved problems to display.      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    TONYA - HARRY/ATN, Cr bumped to 3.4>3.6 from 4.  Baseline under 1.   Per renal:continued HARRY/ATN.  Lytes OK. Monitor another day for any evidence of stabilizing function but discussed possible need for HD over the weekend (patient hs RUE AVF with recent fistulogram). Midodrine for BP support.  Will give an increased dose of Lasix x 2 today.  Transplant US with no hydro and no arterial or venous stenosis.  Prograf level was 3.4 on 11/30 and jumped to 15.2 on 12/3.  Hold PM dose today and decrease to 0.5 mg BID.  Repeat level tomorrow AM.  On AC and Vascular following.  S/P fistulogram.  Hgb 7.9.  Repleting iron.      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  -Nephrology consult     Hypothyroidism  -Continue Synthroid    VTE Prophylaxis:  Pharmacologic VTE prophylaxis orders are present.    Disposition Planning:   Barriers to Discharge:dvt  Anticipated Date of Discharge: 12/3  Place of Discharge: home      Time: 35 minutes     There are no questions and answers to display.       Signature: Electronically signed by Khris Tomas MD, 12/07/24, 08:58 EST.  [facility]ist Team

## 2024-12-07 NOTE — PLAN OF CARE
Pt has been pleasant & cooperative. Pt has complained of bilateral lower leg pain that has been treated with prn pain medication. Pt is on O2 at 2L NC. Will continue to monitor.

## 2024-12-08 LAB
ALBUMIN SERPL-MCNC: 3.1 G/DL (ref 3.5–5.2)
ANION GAP SERPL CALCULATED.3IONS-SCNC: 12.9 MMOL/L (ref 5–15)
APTT PPP: 44.5 SECONDS (ref 22.7–35.4)
BUN SERPL-MCNC: 49 MG/DL (ref 8–23)
BUN/CREAT SERPL: 12 (ref 7–25)
CALCIUM SPEC-SCNC: 9.3 MG/DL (ref 8.6–10.5)
CHLORIDE SERPL-SCNC: 96 MMOL/L (ref 98–107)
CO2 SERPL-SCNC: 19.1 MMOL/L (ref 22–29)
CREAT SERPL-MCNC: 4.08 MG/DL (ref 0.57–1)
EGFRCR SERPLBLD CKD-EPI 2021: 10.8 ML/MIN/1.73
GLUCOSE SERPL-MCNC: 98 MG/DL (ref 65–99)
HBV SURFACE AG SERPL QL IA: NORMAL
PHOSPHATE SERPL-MCNC: 5.4 MG/DL (ref 2.5–4.5)
POTASSIUM SERPL-SCNC: 5.3 MMOL/L (ref 3.5–5.2)
SODIUM SERPL-SCNC: 128 MMOL/L (ref 136–145)

## 2024-12-08 PROCEDURE — 80069 RENAL FUNCTION PANEL: CPT | Performed by: STUDENT IN AN ORGANIZED HEALTH CARE EDUCATION/TRAINING PROGRAM

## 2024-12-08 PROCEDURE — 85730 THROMBOPLASTIN TIME PARTIAL: CPT | Performed by: STUDENT IN AN ORGANIZED HEALTH CARE EDUCATION/TRAINING PROGRAM

## 2024-12-08 PROCEDURE — 87340 HEPATITIS B SURFACE AG IA: CPT | Performed by: INTERNAL MEDICINE

## 2024-12-08 PROCEDURE — 63710000001 TACROLIMUS PER 1 MG: Performed by: INTERNAL MEDICINE

## 2024-12-08 PROCEDURE — 63710000001 PREDNISONE PER 5 MG: Performed by: STUDENT IN AN ORGANIZED HEALTH CARE EDUCATION/TRAINING PROGRAM

## 2024-12-08 PROCEDURE — 25010000002 NA FERRIC GLUC CPLX PER 12.5 MG: Performed by: INTERNAL MEDICINE

## 2024-12-08 RX ADMIN — SILDENAFIL 10 MG: 20 TABLET ORAL at 18:08

## 2024-12-08 RX ADMIN — NYSTATIN: 100000 POWDER TOPICAL at 23:32

## 2024-12-08 RX ADMIN — MIDODRINE HYDROCHLORIDE 15 MG: 5 TABLET ORAL at 08:35

## 2024-12-08 RX ADMIN — SILDENAFIL 10 MG: 20 TABLET ORAL at 08:35

## 2024-12-08 RX ADMIN — SODIUM CHLORIDE 125 MG: 9 INJECTION, SOLUTION INTRAVENOUS at 08:36

## 2024-12-08 RX ADMIN — MIDODRINE HYDROCHLORIDE 15 MG: 5 TABLET ORAL at 10:56

## 2024-12-08 RX ADMIN — METOPROLOL TARTRATE 5 MG: 1 INJECTION, SOLUTION INTRAVENOUS at 00:50

## 2024-12-08 RX ADMIN — TACROLIMUS 0.5 MG: 0.5 CAPSULE ORAL at 23:32

## 2024-12-08 RX ADMIN — ATORVASTATIN CALCIUM 10 MG: 10 TABLET ORAL at 08:36

## 2024-12-08 RX ADMIN — MIDODRINE HYDROCHLORIDE 15 MG: 5 TABLET ORAL at 18:08

## 2024-12-08 RX ADMIN — DOXYCYCLINE 100 MG: 100 CAPSULE ORAL at 23:31

## 2024-12-08 RX ADMIN — DULOXETINE HYDROCHLORIDE 20 MG: 20 CAPSULE, DELAYED RELEASE ORAL at 08:35

## 2024-12-08 RX ADMIN — Medication 12.5 MG: at 08:35

## 2024-12-08 RX ADMIN — Medication 10 ML: at 23:33

## 2024-12-08 RX ADMIN — CHOLESTYRAMINE 4 G: 4 POWDER, FOR SUSPENSION ORAL at 08:36

## 2024-12-08 RX ADMIN — SILDENAFIL 10 MG: 20 TABLET ORAL at 23:32

## 2024-12-08 RX ADMIN — TACROLIMUS 0.5 MG: 0.5 CAPSULE ORAL at 08:36

## 2024-12-08 RX ADMIN — MIRTAZAPINE 15 MG: 15 TABLET, FILM COATED ORAL at 23:31

## 2024-12-08 RX ADMIN — DOXYCYCLINE 100 MG: 100 CAPSULE ORAL at 08:36

## 2024-12-08 RX ADMIN — FAMOTIDINE 20 MG: 20 TABLET, FILM COATED ORAL at 08:35

## 2024-12-08 RX ADMIN — APIXABAN 5 MG: 5 TABLET, FILM COATED ORAL at 08:35

## 2024-12-08 RX ADMIN — Medication 12.5 MG: at 23:31

## 2024-12-08 RX ADMIN — HYDROCODONE BITARTRATE AND ACETAMINOPHEN 1 TABLET: 7.5; 325 TABLET ORAL at 10:50

## 2024-12-08 RX ADMIN — PREDNISONE 5 MG: 5 TABLET ORAL at 08:35

## 2024-12-08 RX ADMIN — HYDROXYZINE HYDROCHLORIDE 25 MG: 25 TABLET ORAL at 23:31

## 2024-12-08 RX ADMIN — LEVOTHYROXINE SODIUM 50 MCG: 50 TABLET ORAL at 05:46

## 2024-12-08 RX ADMIN — QUETIAPINE FUMARATE 25 MG: 25 TABLET ORAL at 23:32

## 2024-12-08 RX ADMIN — APIXABAN 5 MG: 5 TABLET, FILM COATED ORAL at 23:31

## 2024-12-08 RX ADMIN — NYSTATIN: 100000 POWDER TOPICAL at 08:00

## 2024-12-08 NOTE — PLAN OF CARE
Goal Outcome Evaluation:  Plan of Care Reviewed With: patient        Progress: no change  Outcome Evaluation: Patient alert and oriented, forgetful at times. Oxygen on at 2L NC. No urine output this shift. Heart rate elevated and irregular. PRN meds given. Edema generalized. Possible Dialysis today.

## 2024-12-08 NOTE — PROGRESS NOTES
"RENAL/K[facility]:     LOS: 8 days    Patient Care Team:  Kvng Guillen MD as PCP - General (Family Medicine)  Jak Gavin MD as Cardiologist (Cardiology)    Chief Complaint:  TONYA/CKD, Kidney transplant    Subjective     Interval History:   Chart reviewed  Still with low urine output   Bp near goal   Tolerating po     Objective     Vital Sign Min/Max for last 24 hours  Temp  Min: 97.4 °F (36.3 °C)  Max: 98.3 °F (36.8 °C)   BP  Min: 106/87  Max: 128/72   Pulse  Min: 97  Max: 120   Resp  Min: 18  Max: 23   SpO2  Min: 95 %  Max: 100 %   Flow (L/min) (Oxygen Therapy)  Min: 2  Max: 2   No data recorded     Flowsheet Rows      Flowsheet Row First Filed Value   Admission Height 167.6 cm (66\") Documented at 11/29/2024 1238   Admission Weight 74.5 kg (164 lb 3.9 oz) Documented at 11/29/2024 1238            No intake/output data recorded.  I/O last 3 completed shifts:  In: -   Out: 200 [Urine:200]    Physical Exam:  GEN: Awake, NAD  ENT: PERRL, EOMI, MMM  NECK: Supple, no JVD  CHEST: CTAB, no W/R/C  CV: RRR, no M/G/R  ABD: Soft, NT, +BS  SKIN: Warm and Dry +edema   NEURO: CN's intact      WBC WBC   Date Value Ref Range Status   12/06/2024 12.07 (H) 3.40 - 10.80 10*3/mm3 Final      HGB Hemoglobin   Date Value Ref Range Status   12/06/2024 7.9 (L) 12.0 - 15.9 g/dL Final      HCT Hematocrit   Date Value Ref Range Status   12/06/2024 27.9 (L) 34.0 - 46.6 % Final      Platlets No results found for: \"LABPLAT\"   MCV MCV   Date Value Ref Range Status   12/06/2024 90.9 79.0 - 97.0 fL Final          Sodium Sodium   Date Value Ref Range Status   12/08/2024 128 (L) 136 - 145 mmol/L Final   12/07/2024 127 (L) 136 - 145 mmol/L Final   12/06/2024 128 (L) 136 - 145 mmol/L Final      Potassium Potassium   Date Value Ref Range Status   12/08/2024 5.3 (H) 3.5 - 5.2 mmol/L Final     Comment:     Specimen hemolyzed.  Result may be falsely elevated.   12/07/2024 5.0 3.5 - 5.2 mmol/L Final   12/06/2024 5.1 3.5 - 5.2 mmol/L Final      Chloride Chloride " "  Date Value Ref Range Status   12/08/2024 96 (L) 98 - 107 mmol/L Final   12/07/2024 95 (L) 98 - 107 mmol/L Final   12/06/2024 97 (L) 98 - 107 mmol/L Final      CO2 CO2   Date Value Ref Range Status   12/08/2024 19.1 (L) 22.0 - 29.0 mmol/L Final   12/07/2024 19.7 (L) 22.0 - 29.0 mmol/L Final   12/06/2024 20.3 (L) 22.0 - 29.0 mmol/L Final      BUN BUN   Date Value Ref Range Status   12/08/2024 49 (H) 8 - 23 mg/dL Final   12/07/2024 44 (H) 8 - 23 mg/dL Final   12/06/2024 40 (H) 8 - 23 mg/dL Final      Creatinine Creatinine   Date Value Ref Range Status   12/08/2024 4.08 (H) 0.57 - 1.00 mg/dL Final   12/07/2024 3.66 (H) 0.57 - 1.00 mg/dL Final   12/06/2024 3.42 (H) 0.57 - 1.00 mg/dL Final      Calcium Calcium   Date Value Ref Range Status   12/08/2024 9.3 8.6 - 10.5 mg/dL Final   12/07/2024 9.2 8.6 - 10.5 mg/dL Final   12/06/2024 9.1 8.6 - 10.5 mg/dL Final      PO4 No results found for: \"CAPO4\"   Albumin Albumin   Date Value Ref Range Status   12/08/2024 3.1 (L) 3.5 - 5.2 g/dL Final   12/07/2024 3.1 (L) 3.5 - 5.2 g/dL Final   12/06/2024 3.2 (L) 3.5 - 5.2 g/dL Final      Magnesium Magnesium   Date Value Ref Range Status   12/06/2024 1.9 1.6 - 2.4 mg/dL Final      Uric Acid No results found for: \"URICACID\"        Results Review:     I reviewed the patient's new clinical results.    apixaban, 5 mg, Oral, BID  atorvastatin, 10 mg, Oral, Daily  cholestyramine light, 1 packet, Oral, Q24H  doxycycline, 100 mg, Oral, Q12H  DULoxetine, 20 mg, Oral, Daily  epoetin rylee/rylee-epbx, 20,000 Units, Subcutaneous, Weekly  famotidine, 20 mg, Oral, Every Other Day  ferric gluconate, 125 mg, Intravenous, Daily  hydrOXYzine, 25 mg, Oral, Nightly  levothyroxine, 50 mcg, Oral, Q AM  metoprolol tartrate, 12.5 mg, Oral, Q12H  midodrine, 15 mg, Oral, TID AC  mirtazapine, 15 mg, Oral, Nightly  nystatin, , Topical, Q12H  predniSONE, 5 mg, Oral, Daily With Breakfast  QUEtiapine, 25 mg, Oral, Nightly  sildenafil, 10 mg, Oral, TID  sodium chloride, " 10 mL, Intravenous, Q12H  sodium chloride, 10 mL, Intravenous, Q12H  sodium chloride, 10 mL, Intravenous, Q12H  tacrolimus, 0.5 mg, Oral, BID             Medication Review: Reviewed    Assessment & Plan     1) Kidney Transplant  2) TONYA - HARRY/ATN, Cr bumped to 3  3) DVT  4) Hypotension  5) Anemia    Plan:   Cr and K continue to increase  UOP with no response to lasix   HD today.   Has AVF and is s/p angiogram   Continue on Midodrine for BP support.    Transplant US with no hydro and no arterial or venous stenosis.   On AC and Vascular following. Hgb 7.9.  Repleting iron.  ABX per primary.  Pulm following as well.  Palliative following.       Keara Shah MD  [facility]  12/08/24  14:07 EST

## 2024-12-08 NOTE — PROGRESS NOTES
[facility] MEDICINE SERVICE  DAILY PROGRESS NOTE    NAME: Jaja Carcamo  : 1945  MRN: [id number]      LOS: 8 days     PROVIDER OF SERVICE: Khris Tomas MD    Chief Complaint: Acute deep vein thrombosis (DVT) of other vein of right upper extremity    Subjective:     Interval History:  History taken from: patient    History of Present Illness: Jaja Carcamo is a 79 y.o. female with a CMH of atrial flutter, COPD, previous history of ESRD now with renal transplant, hypertension who presents to the hospital with complaints of right arm swelling and pain.  The patient is a poor historian but was able to tell me that she has an AV fistula on her right arm for previous history of ESRD on dialysis.  However she received a renal transplant a few years ago and has not required hemodialysis since then.  She did notice that over the past few weeks she has had increasing right upper extremity pain and swelling that is extending all the way up to her right shoulder.  She is complaining of some enlargement of veins in her right upper arm and shoulder area.  She also complains of pain in that area.  She denies any shortness of breath, cough, congestion, fevers, chills.  RUE ultrasound in the ER did show extensive DVT of the right internal jugular vein.       seen in bed LINNEA, VSS, DW RN,   examined in bed acute distress, dyspnea on 8 L of oxygen, patient with significant edema left right upper extremity.  Poor access, ordered PICC team for line placement.  Discussed with RN.  24 patient seen in bed no acute distress, dyspnea on 8 L, patient with hypotension.  Will increase midodrine, received fluid bolus.  Will give albumin.  Discussed with RN.  12/3/24 seen in bed NAD, BP 94/56. Wbc 16, underwent:Right arm fistulogram  Central venogram  Balloon angioplasty of right cephalic vein with 7 mm, 10 mm, and 12 mm angioplasty balloons  Balloon angioplasty of right subclavian vein with 7mm, 10mm, and  12mm angioplasty balloons3\  12/4/2024 patient seen and examined in bed acute distress, assessment #2 confusion are normal.  Heart rate 125.  Patient with fatigue weakness, dyspnea on 5 L.  Palliative care consulted.  12/5/24 patient seen in bed NAD, doing better today, will change anticoagulation to po, and antibiotics to po,   12/6/24 seen in bed NAD, c/o nausea, BP stable, DW RN creat 3.4  12/7 seen in bed NAD, no new complaints, , creat 3.6  12/8 seen in bed NAD. VSS, creat  worse today 4, HD today.     Review of Systems  10 point ROS note except for above  Objective:     Vital Signs  Temp:  [97.4 °F (36.3 °C)-98.3 °F (36.8 °C)] 97.4 °F (36.3 °C)  Heart Rate:  [] 97  Resp:  [18-23] 18  BP: (106-128)/(59-87) 114/66  Flow (L/min) (Oxygen Therapy):  [2] 2   Body mass index is 29.05 kg/m².    Physical Exam  General Appearance:  Alert, cooperative, no distress, appears stated age  Head:   Normocephalic, without obvious abnormality, atraumatic  Eyes:   PERRL, conjunctiva/corneas clear, EOM's intact, fundi benign, both eyes  Ears:    Normal TM's and external ear canals, both ears  Nose:Nares normal, septum midline, mucosa normal, no drainage or sinus tenderness  Throat:Lips, mucosa, and tongue normal; teeth and gums normal  Neck:Supple, symmetrical, trachea midline, no adenopathy, thyroid: not enlarged, symmetric, no tenderness/mass/nodules, no carotid bruit or JVD  Lungs:             Clear to auscultation bilaterally, respirations unlabored  Heart:  Regular rate and rhythm, S1, S2 normal, no murmur, rub or gallop  Abdomen:  Soft, non-tender, bowel sounds active all four quadrants,  no masses, no organomegaly  Extremities:RUE nonpitting edema with nonfunctioning AV fistula, enlargement of right upper arm veins  Pulses:2+ and symmetric  Skin:Skin color, texture, turgor normal, no rashes or lesions  Neurologic: Normal     Diagnostic Data    Results from last 7 days   Lab Units 12/08/24  0048 12/07/24  0352  12/06/24  0048 12/03/24  0051 12/02/24  0018   WBC 10*3/mm3  --   --  12.07*   < > 12.86*   HEMOGLOBIN g/dL  --   --  7.9*   < > 8.1*   HEMATOCRIT %  --   --  27.9*   < > 28.5*   PLATELETS 10*3/mm3  --   --  180   < > 200   GLUCOSE mg/dL 98   < > 130*   < > 81   CREATININE mg/dL 4.08*   < > 3.42*   < > 1.08*   BUN mg/dL 49*   < > 40*   < > 24*   SODIUM mmol/L 128*   < > 128*   < > 137   POTASSIUM mmol/L 5.3*   < > 5.1   < > 4.7   AST (SGOT) U/L  --   --   --   --  18   ALT (SGPT) U/L  --   --   --   --  5   ALK PHOS U/L  --   --   --   --  60   BILIRUBIN mg/dL  --   --   --   --  0.6   ANION GAP mmol/L 12.9   < > 10.7   < > 8.5    < > = values in this interval not displayed.       No radiology results for the last day      I reviewed the patient's new clinical results.    Assessment/Plan:     Active and Resolved Problems  Active Hospital Problems    Diagnosis  POA    **Acute deep vein thrombosis (DVT) of other vein of right upper extremity [I82.621]  Yes    Acute DVT (deep venous thrombosis) [I82.409]  Yes      Resolved Hospital Problems   No resolved problems to display.      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    TONYA - HARRY/ATN, Cr bumped to 3.4>3.6 from 4.  Baseline under 1.   Per renal:continued HARRY/ATN.  Cr and K continue to increase  UOP with no response to lasix   HD today.   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  -Nephrology consult     Hypothyroidism  -Continue Synthroid    VTE Prophylaxis:  Pharmacologic VTE prophylaxis orders are present.    Disposition Planning:   Barriers to Discharge:dvt  Anticipated Date of Discharge: 12/3  Place of Discharge: home      Time: 35 minutes     There are no questions and answers to display.       Signature: Electronically signed by Khris Tomas MD, 12/08/24, 14:40 EST.  [facility]ist Team

## 2024-12-08 NOTE — PROGRESS NOTES
Daily Progress Note          Assessment  Hypoxic respiratory insufficiency multifactorial   negative respiratory panel      right IJ DVT, brachial artery aneurysmal dilation/AV fistula  No PE on CAT scan  Pulm edema with bilateral pleural effusion left > right  A-fib with RVR was on Eliquis as an outpatient     Pulmonary hypertension  2D echo 10/4/2024  EF 58 % (Biplane Amato's method).   Severe biatrial enlargement.  moderate mitral regurgitation. moderate tricuspid regurgitation.   Calculated pulmonary systolic pressure of 72 mmHg.   Dilated inferior vena cava consistent with a mean right atrial pressure of 15  mmHg.      ? 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 difficile 6/7/2024  History of ESBL E. coli and 6/5/2024  Hypothyroidism  CAD  History of DVT     Pulmonary hypertension  HTN        Recommendations:     Oxygen supplement and titration to maintain saturation 90-95%: Currently on 2 L per nasal cannula     Cont Low-dose Revatio 10 mg 3 times daily   midodrine 15 mg 3 times daily started we will continue to monitor blood pressure   No nitrates     Diuresis by nephrology  Immunosuppressive agents Prograf for renal transplant      Anticoagulation: Eliquis     Empiric antibiotics completed IV Zosyn     Avoid sedatives            Chest x-ray 12/1/2024                    LOS: 8 days     Subjective     Gradual improvement in cough and shortness of breath    Objective     Vital signs for last 24 hours:  Vitals:    12/08/24 0213 12/08/24 0323 12/08/24 0800 12/08/24 1100   BP:  112/61 107/75 114/66   BP Location:   Left arm Left arm   Patient Position:   Lying Lying   Pulse: 102 100 109 97   Resp:  21 20 18   Temp:  97.5 °F (36.4 °C)  97.4 °F (36.3 °C)   TempSrc:  Oral  Oral   SpO2: 100% 100% 100% 100%   Weight:       Height:           Intake/Output last 3 shifts:  I/O last 3 completed shifts:  In: -   Out: 200  [Urine:200]  Intake/Output this shift:  No intake/output data recorded.      Radiology  Imaging Results (Last 24 Hours)       ** No results found for the last 24 hours. **            Labs:  Results from last 7 days   Lab Units 12/06/24  0048   WBC 10*3/mm3 12.07*   HEMOGLOBIN g/dL 7.9*   HEMATOCRIT % 27.9*   PLATELETS 10*3/mm3 180     Results from last 7 days   Lab Units 12/08/24  0048 12/03/24  0051 12/02/24  0018   SODIUM mmol/L 128*   < > 137   POTASSIUM mmol/L 5.3*   < > 4.7   CHLORIDE mmol/L 96*   < > 101   CO2 mmol/L 19.1*   < > 27.5   BUN mg/dL 49*   < > 24*   CREATININE mg/dL 4.08*   < > 1.08*   CALCIUM mg/dL 9.3   < > 8.6   BILIRUBIN mg/dL  --   --  0.6   ALK PHOS U/L  --   --  60   ALT (SGPT) U/L  --   --  5   AST (SGOT) U/L  --   --  18   GLUCOSE mg/dL 98   < > 81    < > = values in this interval not displayed.     Results from last 7 days   Lab Units 12/01/24  1550   PH, ARTERIAL pH units 7.326*   PO2 ART mm Hg 58.5*   PCO2, ARTERIAL mm Hg 55.4*   HCO3 ART mmol/L 29.0*     Results from last 7 days   Lab Units 12/08/24  0048 12/07/24  0352 12/06/24  0048   ALBUMIN g/dL 3.1* 3.1* 3.2*             Results from last 7 days   Lab Units 12/06/24  0048   MAGNESIUM mg/dL 1.9     Results from last 7 days   Lab Units 12/08/24  0048 12/07/24  0352 12/06/24  0048 12/02/24  0747 12/02/24  0018   INR   --   --   --   --  1.76*   APTT seconds 44.5* 46.3* 38.7*   < > 90.0*    < > = values in this interval not displayed.               Meds:   SCHEDULE  apixaban, 5 mg, Oral, BID  atorvastatin, 10 mg, Oral, Daily  cholestyramine light, 1 packet, Oral, Q24H  doxycycline, 100 mg, Oral, Q12H  DULoxetine, 20 mg, Oral, Daily  epoetin rylee/rylee-epbx, 20,000 Units, Subcutaneous, Weekly  famotidine, 20 mg, Oral, Every Other Day  ferric gluconate, 125 mg, Intravenous, Daily  hydrOXYzine, 25 mg, Oral, Nightly  levothyroxine, 50 mcg, Oral, Q AM  metoprolol tartrate, 12.5 mg, Oral, Q12H  midodrine, 15 mg, Oral, TID AC  mirtazapine, 15  mg, Oral, Nightly  nystatin, , Topical, Q12H  predniSONE, 5 mg, Oral, Daily With Breakfast  QUEtiapine, 25 mg, Oral, Nightly  sildenafil, 10 mg, Oral, TID  sodium chloride, 10 mL, Intravenous, Q12H  sodium chloride, 10 mL, Intravenous, Q12H  sodium chloride, 10 mL, Intravenous, Q12H  tacrolimus, 0.5 mg, Oral, BID      Infusions     PRNs    acetaminophen    senna-docusate sodium **AND** polyethylene glycol **AND** bisacodyl **AND** bisacodyl    diazePAM    HYDROcodone-acetaminophen    influenza vaccine    LORazepam    metoprolol tartrate    ondansetron    prochlorperazine    [COMPLETED] Insert Peripheral IV **AND** sodium chloride    sodium chloride    sodium chloride    sodium chloride    sodium chloride    Physical Exam:  General Appearance:  Alert   HEENT:  Normocephalic, without obvious abnormality, Conjunctiva/corneas clear,.   Nares normal, no drainage     Neck:  Supple, symmetrical, trachea midline.   Lungs /Chest wall:   Bilateral basal rhonchi, respirations unlabored, symmetrical wall movement.     Heart:  Regular rate and rhythm, S1 S2 normal  Abdomen: Soft, non-tender, no masses, no organomegaly.    Extremities: No edema, no clubbing or cyanosis     ROS  Constitutional: Negative for chills, fever and malaise/fatigue.   HENT: Negative.    Eyes: Negative.    Cardiovascular: Negative.    Respiratory: Positive for improving cough and shortness of breath.    Skin: Negative.    Musculoskeletal: Negative.    Gastrointestinal: Negative.    Genitourinary: Negative.    Neurological: Generalized weakness      I reviewed the recent clinical results  I personally reviewed the latest radiological studies    Part of this note may be an electronic transcription/translation of spoken language to printed text using the Dragon Dictation System.

## 2024-12-08 NOTE — PROGRESS NOTES
"RENAL/KCC:     LOS: 7 days    Patient Care Team:  Kvng Guillen MD as PCP - General (Family Medicine)  Jak Gavin MD as Cardiologist (Cardiology)    Chief Complaint:  TONYA/CKD, Kidney transplant    Subjective     Interval History:   Chart reviewed  Still with low urine output   Bp near goal   Tolerating po     Objective     Vital Sign Min/Max for last 24 hours  Temp  Min: 97.3 °F (36.3 °C)  Max: 97.6 °F (36.4 °C)   BP  Min: 106/87  Max: 131/57   Pulse  Min: 105  Max: 118   Resp  Min: 17  Max: 26   SpO2  Min: 88 %  Max: 100 %   Flow (L/min) (Oxygen Therapy)  Min: 2  Max: 4   No data recorded     Flowsheet Rows      Flowsheet Row First Filed Value   Admission Height 167.6 cm (66\") Documented at 11/29/2024 1238   Admission Weight 74.5 kg (164 lb 3.9 oz) Documented at 11/29/2024 1238            No intake/output data recorded.  I/O last 3 completed shifts:  In: 0   Out: 200 [Urine:200]    Physical Exam:  GEN: Awake, NAD  ENT: PERRL, EOMI, MMM  NECK: Supple, no JVD  CHEST: CTAB, no W/R/C  CV: RRR, no M/G/R  ABD: Soft, NT, +BS  SKIN: Warm and Dry +edema   NEURO: CN's intact      WBC WBC   Date Value Ref Range Status   12/06/2024 12.07 (H) 3.40 - 10.80 10*3/mm3 Final   12/05/2024 14.69 (H) 3.40 - 10.80 10*3/mm3 Final      HGB Hemoglobin   Date Value Ref Range Status   12/06/2024 7.9 (L) 12.0 - 15.9 g/dL Final   12/05/2024 7.9 (L) 12.0 - 15.9 g/dL Final      HCT Hematocrit   Date Value Ref Range Status   12/06/2024 27.9 (L) 34.0 - 46.6 % Final   12/05/2024 27.8 (L) 34.0 - 46.6 % Final      Platlets No results found for: \"LABPLAT\"   MCV MCV   Date Value Ref Range Status   12/06/2024 90.9 79.0 - 97.0 fL Final   12/05/2024 91.7 79.0 - 97.0 fL Final          Sodium Sodium   Date Value Ref Range Status   12/07/2024 127 (L) 136 - 145 mmol/L Final   12/06/2024 128 (L) 136 - 145 mmol/L Final   12/05/2024 133 (L) 136 - 145 mmol/L Final      Potassium Potassium   Date Value Ref Range Status   12/07/2024 5.0 3.5 - 5.2 mmol/L Final " "  12/06/2024 5.1 3.5 - 5.2 mmol/L Final   12/05/2024 5.1 3.5 - 5.2 mmol/L Final      Chloride Chloride   Date Value Ref Range Status   12/07/2024 95 (L) 98 - 107 mmol/L Final   12/06/2024 97 (L) 98 - 107 mmol/L Final   12/05/2024 101 98 - 107 mmol/L Final      CO2 CO2   Date Value Ref Range Status   12/07/2024 19.7 (L) 22.0 - 29.0 mmol/L Final   12/06/2024 20.3 (L) 22.0 - 29.0 mmol/L Final   12/05/2024 20.5 (L) 22.0 - 29.0 mmol/L Final      BUN BUN   Date Value Ref Range Status   12/07/2024 44 (H) 8 - 23 mg/dL Final   12/06/2024 40 (H) 8 - 23 mg/dL Final   12/05/2024 37 (H) 8 - 23 mg/dL Final      Creatinine Creatinine   Date Value Ref Range Status   12/07/2024 3.66 (H) 0.57 - 1.00 mg/dL Final   12/06/2024 3.42 (H) 0.57 - 1.00 mg/dL Final   12/05/2024 3.01 (H) 0.57 - 1.00 mg/dL Final      Calcium Calcium   Date Value Ref Range Status   12/07/2024 9.2 8.6 - 10.5 mg/dL Final   12/06/2024 9.1 8.6 - 10.5 mg/dL Final   12/05/2024 9.2 8.6 - 10.5 mg/dL Final      PO4 No results found for: \"CAPO4\"   Albumin Albumin   Date Value Ref Range Status   12/07/2024 3.1 (L) 3.5 - 5.2 g/dL Final   12/06/2024 3.2 (L) 3.5 - 5.2 g/dL Final   12/05/2024 3.0 (L) 3.5 - 5.2 g/dL Final      Magnesium Magnesium   Date Value Ref Range Status   12/06/2024 1.9 1.6 - 2.4 mg/dL Final   12/05/2024 1.9 1.6 - 2.4 mg/dL Final      Uric Acid No results found for: \"URICACID\"        Results Review:     I reviewed the patient's new clinical results.    apixaban, 5 mg, Oral, BID  atorvastatin, 10 mg, Oral, Daily  cholestyramine light, 1 packet, Oral, Q24H  doxycycline, 100 mg, Oral, Q12H  DULoxetine, 20 mg, Oral, Daily  epoetin rylee/rylee-epbx, 20,000 Units, Subcutaneous, Weekly  [START ON 12/8/2024] famotidine, 20 mg, Oral, Every Other Day  ferric gluconate, 125 mg, Intravenous, Daily  hydrOXYzine, 25 mg, Oral, Nightly  levothyroxine, 50 mcg, Oral, Q AM  metoprolol tartrate, 12.5 mg, Oral, Q12H  midodrine, 15 mg, Oral, TID AC  mirtazapine, 15 mg, Oral, " Nightly  nystatin, , Topical, Q12H  predniSONE, 5 mg, Oral, Daily With Breakfast  QUEtiapine, 25 mg, Oral, Nightly  sildenafil, 10 mg, Oral, TID  sodium chloride, 10 mL, Intravenous, Q12H  sodium chloride, 10 mL, Intravenous, Q12H  sodium chloride, 10 mL, Intravenous, Q12H  tacrolimus, 0.5 mg, Oral, BID             Medication Review: Reviewed    Assessment & Plan     1) Kidney Transplant  2) TONYA - HARRY/ATN, Cr bumped to 3  3) DVT  4) Hypotension  5) Anemia    Plan: Cr up to 3.66   K now at 5.0   UOP with no response to lasix   Plan to resume hemodialysis in am as long as it's within goals   Has AVF and is s/p angiogram   Continue on Midodrine for BP support.    Transplant US with no hydro and no arterial or venous stenosis.   On AC and Vascular following. Hgb 7.9.  Repleting iron.  ABX per primary.  Pulm following as well.  Palliative following.       Mariaelena Clay MD  [facility]  12/07/24  20:18 EST

## 2024-12-09 LAB
ALBUMIN SERPL-MCNC: 2.9 G/DL (ref 3.5–5.2)
ANION GAP SERPL CALCULATED.3IONS-SCNC: 11.6 MMOL/L (ref 5–15)
APTT PPP: 62 SECONDS (ref 22.7–35.4)
BUN SERPL-MCNC: 32 MG/DL (ref 8–23)
BUN/CREAT SERPL: 10.5 (ref 7–25)
CALCIUM SPEC-SCNC: 9 MG/DL (ref 8.6–10.5)
CHLORIDE SERPL-SCNC: 98 MMOL/L (ref 98–107)
CO2 SERPL-SCNC: 21.4 MMOL/L (ref 22–29)
CREAT SERPL-MCNC: 3.06 MG/DL (ref 0.57–1)
EGFRCR SERPLBLD CKD-EPI 2021: 15.2 ML/MIN/1.73
GLUCOSE SERPL-MCNC: 111 MG/DL (ref 65–99)
HBV SURFACE AB SER RIA-ACNC: NORMAL
PHOSPHATE SERPL-MCNC: 4.6 MG/DL (ref 2.5–4.5)
POTASSIUM SERPL-SCNC: 3.9 MMOL/L (ref 3.5–5.2)
SODIUM SERPL-SCNC: 131 MMOL/L (ref 136–145)
WHOLE BLOOD HOLD SPECIMEN: NORMAL

## 2024-12-09 PROCEDURE — 97530 THERAPEUTIC ACTIVITIES: CPT

## 2024-12-09 PROCEDURE — 63710000001 TACROLIMUS PER 1 MG: Performed by: INTERNAL MEDICINE

## 2024-12-09 PROCEDURE — 25010000002 NA FERRIC GLUC CPLX PER 12.5 MG: Performed by: INTERNAL MEDICINE

## 2024-12-09 PROCEDURE — 36415 COLL VENOUS BLD VENIPUNCTURE: CPT | Performed by: STUDENT IN AN ORGANIZED HEALTH CARE EDUCATION/TRAINING PROGRAM

## 2024-12-09 PROCEDURE — 86704 HEP B CORE ANTIBODY TOTAL: CPT | Performed by: INTERNAL MEDICINE

## 2024-12-09 PROCEDURE — 80069 RENAL FUNCTION PANEL: CPT | Performed by: STUDENT IN AN ORGANIZED HEALTH CARE EDUCATION/TRAINING PROGRAM

## 2024-12-09 PROCEDURE — 97535 SELF CARE MNGMENT TRAINING: CPT

## 2024-12-09 PROCEDURE — 63710000001 PREDNISONE PER 5 MG: Performed by: STUDENT IN AN ORGANIZED HEALTH CARE EDUCATION/TRAINING PROGRAM

## 2024-12-09 PROCEDURE — 85730 THROMBOPLASTIN TIME PARTIAL: CPT | Performed by: STUDENT IN AN ORGANIZED HEALTH CARE EDUCATION/TRAINING PROGRAM

## 2024-12-09 PROCEDURE — 86706 HEP B SURFACE ANTIBODY: CPT | Performed by: INTERNAL MEDICINE

## 2024-12-09 RX ORDER — OXYCODONE HYDROCHLORIDE 5 MG/1
10 TABLET ORAL EVERY 4 HOURS PRN
Status: DISPENSED | OUTPATIENT
Start: 2024-12-09 | End: 2024-12-14

## 2024-12-09 RX ADMIN — ATORVASTATIN CALCIUM 10 MG: 10 TABLET ORAL at 08:19

## 2024-12-09 RX ADMIN — MIRTAZAPINE 15 MG: 15 TABLET, FILM COATED ORAL at 20:11

## 2024-12-09 RX ADMIN — DULOXETINE HYDROCHLORIDE 20 MG: 20 CAPSULE, DELAYED RELEASE ORAL at 08:18

## 2024-12-09 RX ADMIN — HYDROCODONE BITARTRATE AND ACETAMINOPHEN 1 TABLET: 7.5; 325 TABLET ORAL at 07:24

## 2024-12-09 RX ADMIN — SODIUM CHLORIDE 125 MG: 9 INJECTION, SOLUTION INTRAVENOUS at 08:18

## 2024-12-09 RX ADMIN — MIDODRINE HYDROCHLORIDE 15 MG: 5 TABLET ORAL at 08:19

## 2024-12-09 RX ADMIN — Medication 10 ML: at 08:20

## 2024-12-09 RX ADMIN — SILDENAFIL 10 MG: 20 TABLET ORAL at 16:35

## 2024-12-09 RX ADMIN — HYDROXYZINE HYDROCHLORIDE 25 MG: 25 TABLET ORAL at 20:11

## 2024-12-09 RX ADMIN — OXYCODONE 10 MG: 5 TABLET ORAL at 23:52

## 2024-12-09 RX ADMIN — DOXYCYCLINE 100 MG: 100 CAPSULE ORAL at 20:11

## 2024-12-09 RX ADMIN — NYSTATIN: 100000 POWDER TOPICAL at 20:13

## 2024-12-09 RX ADMIN — Medication 10 ML: at 08:19

## 2024-12-09 RX ADMIN — Medication 12.5 MG: at 08:19

## 2024-12-09 RX ADMIN — CHOLESTYRAMINE 4 G: 4 POWDER, FOR SUSPENSION ORAL at 11:19

## 2024-12-09 RX ADMIN — APIXABAN 5 MG: 5 TABLET, FILM COATED ORAL at 20:12

## 2024-12-09 RX ADMIN — TACROLIMUS 0.5 MG: 0.5 CAPSULE ORAL at 08:19

## 2024-12-09 RX ADMIN — APIXABAN 5 MG: 5 TABLET, FILM COATED ORAL at 08:19

## 2024-12-09 RX ADMIN — Medication 10 ML: at 20:12

## 2024-12-09 RX ADMIN — Medication 12.5 MG: at 20:11

## 2024-12-09 RX ADMIN — HYDROCODONE BITARTRATE AND ACETAMINOPHEN 1 TABLET: 7.5; 325 TABLET ORAL at 15:15

## 2024-12-09 RX ADMIN — QUETIAPINE FUMARATE 25 MG: 25 TABLET ORAL at 20:11

## 2024-12-09 RX ADMIN — MIDODRINE HYDROCHLORIDE 15 MG: 5 TABLET ORAL at 11:19

## 2024-12-09 RX ADMIN — DOXYCYCLINE 100 MG: 100 CAPSULE ORAL at 08:18

## 2024-12-09 RX ADMIN — LEVOTHYROXINE SODIUM 50 MCG: 50 TABLET ORAL at 06:45

## 2024-12-09 RX ADMIN — SILDENAFIL 10 MG: 20 TABLET ORAL at 08:19

## 2024-12-09 RX ADMIN — SILDENAFIL 10 MG: 20 TABLET ORAL at 20:11

## 2024-12-09 RX ADMIN — OXYCODONE 10 MG: 5 TABLET ORAL at 19:16

## 2024-12-09 RX ADMIN — MIDODRINE HYDROCHLORIDE 15 MG: 5 TABLET ORAL at 16:35

## 2024-12-09 RX ADMIN — PREDNISONE 5 MG: 5 TABLET ORAL at 08:19

## 2024-12-09 RX ADMIN — NYSTATIN: 100000 POWDER TOPICAL at 08:20

## 2024-12-09 RX ADMIN — TACROLIMUS 0.5 MG: 0.5 CAPSULE ORAL at 20:11

## 2024-12-09 NOTE — THERAPY TREATMENT NOTE
"Subjective: Pt agreeable to therapeutic plan of care.  Cognition: arousal/alertness: Alert and Attentive    Objective:     Bed Mobility: Mod-A   Functional Transfers: Mod-A and Assist x 2, first transfer max assist x2 for stand pivot sit transfer.  After reporting needing to use [facility] was used for transfer to and from [facility].  She requires assist to extend hips to come to stand.       Balance: sitting EOB Supervision  Functional Ambulation: N/A or Not attempted.    Toileting: Dependent  ADL Position: supported standing  ADL Comments: Using aurora chavez     Vitals: WNL    Pain: 0 VAS  Location: NA  Interventions for pain: N/A  Education: Provided education on the importance of mobility in the acute care setting      Assessment: Jaja Carcamo presents with ADL impairments affecting function including balance, cognition, endurance / activity tolerance, pain, range of motion (ROM), shortness of breath, and strength.  She becomes fatigued quickly with standing activity. Demonstrated functioning below baseline abilities indicate the need for continued skilled intervention while inpatient. Tolerating session today without incident. Will continue to follow and progress as tolerated.     Plan/Recommendations:   Moderate Intensity Therapy recommended post-acute care. This is recommended as therapy feels the patient would require 3-4 days per week and wouldn't tolerate \"3 hour daily\" rehab intensity. SNF would be the preferred choice. If the patient does not agree to SNF, arrange HH or OP depending on home bound status. If patient is medically complex, consider LTACH.. Pt requires no DME at discharge.     Pt desires Skilled Rehab placement at discharge. Pt cooperative; agreeable to therapeutic recommendations and plan of care.     Modified [place]: N/A = No pre-op stroke/TIA    Post-Tx Position: Up in Chair, Alarms activated, and Call light and personal items within reach  PPE: gloves    Therapy Charges for Today       " Code Description Service Date Service Provider Modifiers Qty    83329085469 HC OT SELF CARE/MGMT/TRAIN EA 15 MIN 12/9/2024 Ida Herndon, OT GO 2    69879821838 HC OT THERAPEUTIC ACT EA 15 MIN 12/9/2024 Ida Herndon, OT GO 1           Time Calculation- OT       Row Name 12/09/24 1352             Time Calculation- OT    OT Start Time 1044  -SR      OT Stop Time 1120  -SR      OT Time Calculation (min) 36 min  -SR      Total Timed Code Minutes- OT 36 minute(s)  -SR      OT Received On 12/09/24  -SR      OT - Next Appointment 12/10/24  -SR                User Key  (r) = Recorded By, (t) = Taken By, (c) = Cosigned By      Initials Name Provider Type    SR Ida Herndon, OT Occupational Therapist

## 2024-12-09 NOTE — THERAPY TREATMENT NOTE
"Subjective: Pt agreeable to therapeutic plan of care.    Objective:     Precautions - falls, quick fatigue - assist x2 needed.     Bed mobility - Mod-A supine to sit   Transfers - Mod-A and Assist x 2, first transfer max assist x2 for stand pivot sit transfer to L with arm in arm assist.  Pt with small steps and shaky BLE noted during turning steps to chair. Subsequent tranfers completed with Mayra Stegutierrez. 3 additional reps completed with Mayra Stegutierrez with maxA x2 needed.  Poor hip extension and knee extension noted with all attempts.    Vitals: WNL    Pain: 5 VAS   Location: BLE discomfort  Intervention for pain: Repositioned and Therapeutic Presence    Education: Provided education on the importance of mobility in the acute care setting, Verbal/Tactile Cues, and Transfer Training    Assessment: Jaja Carcamo presents with endurance, strength, and functional mobility impairments which indicate the need for skilled intervention. Able to progress to OOB transfers today with mod/maxA x2.Tolerating session today without incident. Will continue to follow and progress as tolerated.     Plan/Recommendations:   If medically appropriate, Moderate Intensity Therapy recommended post-acute care. This is recommended as therapy feels the patient would require 3-4 days per week and wouldn't tolerate \"3 hour daily\" rehab intensity. SNF would be the preferred choice. If the patient does not agree to SNF, arrange HH or OP depending on home bound status. If patient is medically complex, consider LTACH. Pt requires no DME at discharge.     Pt desires Skilled Rehab placement at discharge. Pt cooperative; agreeable to therapeutic recommendations and plan of care.       Post-Tx Position: Up in Chair, Alarms activated, and Call light and personal items within reach  PPE: gloves    Therapy Charges for Today       Code Description Service Date Service Provider Modifiers Qty    [id number]  PT THERAPEUTIC ACT EA 15 MIN 12/9/2024 Rito, " Alicja, PT GP 3           PT Charges       Row Name 12/09/24 3870             Time Calculation    Start Time 1045  -      Stop Time 1123  -      Time Calculation (min) 38 min  -      PT Non-Billable Time (min) 0 min  -      PT Received On 12/09/24  -      PT - Next Appointment 12/10/24  -         Time Calculation- PT    Total Timed Code Minutes- PT 38 minute(s)  -                User Key  (r) = Recorded By, (t) = Taken By, (c) = Cosigned By      Initials Name Provider Type    Alicja Harris, PT Physical Therapist

## 2024-12-09 NOTE — PROGRESS NOTES
"RENAL/KCC:     LOS: 9 days    Patient Care Team:  Kvng Guillen MD as PCP - General (Family Medicine)  Jak Gavin MD as Cardiologist (Cardiology)    Chief Complaint:  TONYA/CKD, Kidney transplant    Subjective     Interval History:   Chart reviewed  S/P HD yesterday    Objective     Vital Sign Min/Max for last 24 hours  Temp  Min: 96.2 °F (35.7 °C)  Max: 97.8 °F (36.6 °C)   BP  Min: 84/46  Max: 128/75   Pulse  Min: 87  Max: 113   Resp  Min: 13  Max: 24   SpO2  Min: 96 %  Max: 100 %   Flow (L/min) (Oxygen Therapy)  Min: 2  Max: 2   Weight  Min: 85.7 kg (188 lb 15 oz)  Max: 85.7 kg (188 lb 15 oz)     Flowsheet Rows      Flowsheet Row First Filed Value   Admission Height 167.6 cm (66\") Documented at 11/29/2024 1238   Admission Weight 74.5 kg (164 lb 3.9 oz) Documented at 11/29/2024 1238            I/O this shift:  In: 240 [P.O.:240]  Out: -   I/O last 3 completed shifts:  In: 480 [P.O.:480]  Out: 2000     Physical Exam:  GEN: Awake, NAD  ENT: PERRL, EOMI, MMM  NECK: Supple, no JVD  CHEST: CTAB, no W/R/C  CV: RRR, no M/G/R, ++edema  ABD: Soft, NT, +BS  SKIN: Warm and Dry  NEURO: CN's intact      WBC No results found for: \"WBC\"     HGB No results found for: \"HGB\"     HCT No results found for: \"HCT\"     Platlets No results found for: \"LABPLAT\"   MCV No results found for: \"MCV\"         Sodium Sodium   Date Value Ref Range Status   12/08/2024 128 (L) 136 - 145 mmol/L Final   12/07/2024 127 (L) 136 - 145 mmol/L Final      Potassium Potassium   Date Value Ref Range Status   12/08/2024 5.3 (H) 3.5 - 5.2 mmol/L Final     Comment:     Specimen hemolyzed.  Result may be falsely elevated.   12/07/2024 5.0 3.5 - 5.2 mmol/L Final      Chloride Chloride   Date Value Ref Range Status   12/08/2024 96 (L) 98 - 107 mmol/L Final   12/07/2024 95 (L) 98 - 107 mmol/L Final      CO2 CO2   Date Value Ref Range Status   12/08/2024 19.1 (L) 22.0 - 29.0 mmol/L Final   12/07/2024 19.7 (L) 22.0 - 29.0 mmol/L Final      BUN BUN   Date Value Ref " "Range Status   12/08/2024 49 (H) 8 - 23 mg/dL Final   12/07/2024 44 (H) 8 - 23 mg/dL Final      Creatinine Creatinine   Date Value Ref Range Status   12/08/2024 4.08 (H) 0.57 - 1.00 mg/dL Final   12/07/2024 3.66 (H) 0.57 - 1.00 mg/dL Final      Calcium Calcium   Date Value Ref Range Status   12/08/2024 9.3 8.6 - 10.5 mg/dL Final   12/07/2024 9.2 8.6 - 10.5 mg/dL Final      PO4 No results found for: \"CAPO4\"   Albumin Albumin   Date Value Ref Range Status   12/08/2024 3.1 (L) 3.5 - 5.2 g/dL Final   12/07/2024 3.1 (L) 3.5 - 5.2 g/dL Final      Magnesium No results found for: \"MG\"     Uric Acid No results found for: \"URICACID\"        Results Review:     I reviewed the patient's new clinical results.    apixaban, 5 mg, Oral, BID  atorvastatin, 10 mg, Oral, Daily  cholestyramine light, 1 packet, Oral, Q24H  doxycycline, 100 mg, Oral, Q12H  DULoxetine, 20 mg, Oral, Daily  epoetin rylee/rylee-epbx, 20,000 Units, Subcutaneous, Weekly  famotidine, 20 mg, Oral, Every Other Day  ferric gluconate, 125 mg, Intravenous, Daily  hydrOXYzine, 25 mg, Oral, Nightly  levothyroxine, 50 mcg, Oral, Q AM  metoprolol tartrate, 12.5 mg, Oral, Q12H  midodrine, 15 mg, Oral, TID AC  mirtazapine, 15 mg, Oral, Nightly  nystatin, , Topical, Q12H  predniSONE, 5 mg, Oral, Daily With Breakfast  QUEtiapine, 25 mg, Oral, Nightly  sildenafil, 10 mg, Oral, TID  sodium chloride, 10 mL, Intravenous, Q12H  sodium chloride, 10 mL, Intravenous, Q12H  sodium chloride, 10 mL, Intravenous, Q12H  tacrolimus, 0.5 mg, Oral, BID             Medication Review: Reviewed    Assessment & Plan     1) Kidney Transplant - On Prograf  2) TONYA - HARRY/ATN, Cr bumped to 3.4 from 4.  Baseline under 1.  3) DVT  4) Hypotension  5) Anemia    Plan: ATN now requiring acute dialysis.  Continue TTS schedule for now while monitoring for recovery.  Midodrine for BP support.  Transplant US with no hydro and no arterial or venous stenosis.  Prograf level was 3.4 on 11/30 and jumped to 15.2 on " 12/3.  Have decreased to 0.5 mg BID and repeat level pending.  On AC and Vascular following.  Repleting iron.  ABX per primary.  Pulm following as well.        Naun Falcon MD  [facility]  12/09/24  10:51 EST

## 2024-12-09 NOTE — PROGRESS NOTES
Nutrition Services  Patient Name: Jaja Carcamo  YOB: 1945  MRN: [id number]  Admission date: 11/29/2024    Continue Regular diet with thin liquids -- at this point would not apply renal restrictions because phosphorus is only mildly elevated, and K+ is WNL -- pt with diminished appetite; regular diet remains appropriate      CLINICAL NUTRITION ASSESSMENT      Reason for Assessment Follow up protocol   12/02: MST=3   H&P      Past Medical History:   Diagnosis Date    Allergic rhinitis 04/14/2015    Asthma 08/10/2017    Atrial flutter 05/11/2017    Chronic diarrhea 04/15/2019    Chronic hypoxemic respiratory failure 01/18/2024    Chronic pain 02/03/2015    COPD (chronic obstructive pulmonary disease)     COVID-19 virus detected 08/11/2022    Cytokine release syndrome, grade 1 08/16/2022    Edema of both lower extremities due to peripheral venous insufficiency 03/12/2021    ESRD on hemodialysis 05/22/2013    Essential (primary) hypertension 03/03/2022    Fracture of ulnar styloid 05/19/2022    Fracture of unspecified carpal bone, right wrist, subsequent encounter for fracture with routine healing 03/03/2022    Gastroesophageal reflux disease 11/12/2020    Gout 08/10/2017    Hearing loss 08/10/2017    History of appendectomy     History of DVT (deep vein thrombosis) 11/12/2020    History of kidney transplant 06/30/2017    History of lobectomy of lung 01/18/2024 03/08/2016:  RIGHT Lower Lobe Mass--> Right Video-assisted thoracoscopy with a moderate-to-large wedge resection of the RLL (by Dr. Haris Mcconnell @ [facility])--> Poorly differentiated carcinoma of the RLL.      History of repair of hip joint 05/21/2013    History of suicide attempt 05/20/2024    Hyperlipidemia 08/11/2014    Hypothyroidism, unspecified 03/03/2022    Infection due to extended spectrum beta lactamase (ESBL) producing bacteria 03/11/2016    No A2K system hx. +ESBL E coli urine on 3/11/16.      Irritable bowel syndrome  04/15/2019    Long term (current) use of immunosuppressive biologic 04/28/2021    Long term current use of anticoagulant therapy 01/18/2024    Malignant neoplasm of lung 08/10/2017    Menopausal flushing 08/30/2021    Mitral valve regurgitation 07/06/2015    Mixed anxiety and depressive disorder 04/30/2015    Non-small cell lung cancer 08/10/2017    Nonobstructive atherosclerosis of coronary artery 06/02/2019 08/12/2022: CATH: [facility]: NSTEMI assoc with Covid-19: LM:-nl;  LAD: diffuse, Ca++; 30%; CIRC: Dominant. Normal; RCA: small; 50% diffuse, proximal and mid-segment.      NSTEMI (non-ST elevated myocardial infarction) 08/11/2022    Obsessive-compulsive disorder 04/15/2019    Osteoarthritis 05/20/2024    Paroxysmal atrial fibrillation 05/11/2017    Paroxysmal supraventricular tachycardia 05/11/2017    Peripheral neuropathy 08/11/2014    Personal history of peptic ulcer disease 11/12/2020    Postoperative anemia due to acute blood loss 05/22/2013    Right wrist fracture 03/01/2022    Seasonal allergies 08/10/2017    Secondary hyperparathyroidism of renal origin 09/14/2015    Tricuspid valve regurgitation 03/15/2017    Ulcer of lower extremity 08/30/2021    Unspecified acute appendicitis 03/03/2022    Valvular heart disease 05/20/2024    Wegener's granulomatosis with renal involvement 03/03/2022       Past Surgical History:   Procedure Laterality Date    APPENDECTOMY      ARTERIOVENOUS FISTULA/SHUNT SURGERY Right 12/3/2024    Procedure: FISTULOGRAM  and angioplasty RIGHT ARM;  Surgeon: Paulino Alva II, MD;  Location: [facility] HYBRID OR;  Service: Vascular;  Laterality: Right;    BREAST SURGERY Left     cysts rmeoved    CARDIAC CATHETERIZATION Right 08/12/2022    Procedure: Left Heart Cath and coronary angiogram;  Surgeon: Jak Gavin MD;  Location: [facility] CATH INVASIVE LOCATION;  Service: Cardiology;  Laterality: Right;    CLOSED REDUCTION WRIST FRACTURE Right 03/01/2022    Procedure: WRIST CLOSED  "REDUCTION;  Surgeon: Gaudencio Townsend MD;  Location: [facility] MAIN OR;  Service: Orthopedics;  Laterality: Right;    CYSTOSCOPY      HIP ARTHROPLASTY      HYSTERECTOMY      LUNG LOBECTOMY Right     TRANSPLANTATION RENAL          Current Problems   R RUE DVT   - vascular surgery following   - on eliquis PTA  - pt also with high-grade stenosis of R cephalic vein and subclavian vein; vascular service following     TONYA   - receiving HD   - nephrology following   - of note, has Hx renal transplant     Atrial flutter with RVR   - medications in place     COPD   - stable        Encounter Information        Trending Narrative     12/9: Pt assessed for follow up. Since prior assessment, pt has needed to begin acute dialysis and continues on this -- nephrology is following daily. Pt's appetite remains very poor but with some improvement in intake overall; usually eating about \"half\" at meals (noted also ~75% documented at one recent meal). Documented weights much higher than historical weight - may include fluid (RD noted significant edema during assessment); will continue to monitor.    12/02: Pt was admitted with right arm pain and swelling. Patient has a right arm fistula that has not been used in 8 years. Planning for fistulagram.     Pt was sitting up and eating breakfast at my visit. Pt reports that her appetite has been poor over several days. Per chart review, pt's weight has remained stable. Pt declines nutrition supplements. Pt did have some moderate muscle/fat wasting but there is not enough evidence to support a malnutrition diagnosis at this time. Offered to obtain food preferences but pt declined.      Anthropometrics        Current Height, Weight Height: 167.6 cm (66\")  Weight: 85.7 kg (188 lb 15 oz) (12/09/24 0644)       Usual Body Weight (UBW) Unknown        Trending Weight Hx     This admission: 12/9: 5% gain since prior assessment -- fluid related; pt documented +4.9 L  12/02: 180# (obtained 12/01)              " PTA: 12/02: 1% increase over 7 months    Wt Readings from Last 30 Encounters:   12/09/24 0644 85.7 kg (188 lb 15 oz)   12/03/24 0927 81.6 kg (180 lb)   12/01/24 0330 81.8 kg (180 lb 5.4 oz)   11/29/24 1715 81 kg (178 lb 9.2 oz)   11/29/24 1238 74.5 kg (164 lb 3.9 oz)   06/13/24 0452 74.4 kg (164 lb 0.4 oz)   06/11/24 0600 67.5 kg (148 lb 13 oz)   06/08/24 0400 62.5 kg (137 lb 12.6 oz)   06/06/24 0447 64.4 kg (141 lb 15.6 oz)   06/05/24 1152 71.3 kg (157 lb 3 oz)   06/03/24 0501 71.3 kg (157 lb 3 oz)   06/01/24 0644 72.2 kg (159 lb 2.8 oz)   05/29/24 0445 84.7 kg (186 lb 11.7 oz)   05/28/24 0444 84.2 kg (185 lb 10 oz)   05/27/24 0526 84.9 kg (187 lb 2.7 oz)   05/26/24 0406 84.6 kg (186 lb 8.2 oz)   05/25/24 1100 84.6 kg (186 lb 8.2 oz)   05/25/24 0438 85.1 kg (187 lb 9.8 oz)   05/24/24 1143 86.2 kg (190 lb)   05/22/24 0314 89.3 kg (196 lb 13.9 oz)   05/21/24 0430 88.6 kg (195 lb 5.2 oz)   05/20/24 1503 92.1 kg (203 lb)   05/20/24 0413 92.3 kg (203 lb 7.8 oz)   05/19/24 0500 89.9 kg (198 lb 3.1 oz)   05/18/24 1100 88.9 kg (195 lb 15.8 oz)   05/18/24 0308 92.9 kg (204 lb 12.9 oz)   05/17/24 2150 80.7 kg (178 lb)   08/16/22 0535 84.7 kg (186 lb 11.7 oz)   08/14/22 0433 81.3 kg (179 lb 3.7 oz)   08/12/22 0500 81.4 kg (179 lb 7.3 oz)   08/11/22 2154 81.4 kg (179 lb 7.3 oz)   08/11/22 1301 81.6 kg (180 lb)   03/01/22 0804 83 kg (182 lb 15.7 oz)   03/01/22 0527 83 kg (182 lb 15.7 oz)   02/28/22 2241 81 kg (178 lb 9.2 oz)   10/14/20 1055 (P) 85.3 kg (188 lb)      BMI kg/m2 Body mass index is 30.49 kg/m².       Labs        Pertinent Labs    Results from last 7 days   Lab Units 12/09/24  1018 12/08/24  0048 12/07/24  0352   SODIUM mmol/L 131* 128* 127*   POTASSIUM mmol/L 3.9 5.3* 5.0   CHLORIDE mmol/L 98 96* 95*   CO2 mmol/L 21.4* 19.1* 19.7*   BUN mg/dL 32* 49* 44*   CREATININE mg/dL 3.06* 4.08* 3.66*   CALCIUM mg/dL 9.0 9.3 9.2   GLUCOSE mg/dL 111* 98 101*     Results from last 7 days   Lab Units 12/09/24  1018  "12/07/24  0352 12/06/24  0048 12/05/24  0602 12/04/24  0429   MAGNESIUM mg/dL  --   --  1.9 1.9 1.8   PHOSPHORUS mg/dL 4.6*   < > 5.5* 5.2* 4.2   HEMOGLOBIN g/dL  --   --  7.9* 7.9* 7.3*   HEMATOCRIT %  --   --  27.9* 27.8* 25.9*    < > = values in this interval not displayed.     No results found for: \"HGBA1C\"     Medications    Scheduled Medications apixaban, 5 mg, Oral, BID  atorvastatin, 10 mg, Oral, Daily  cholestyramine light, 1 packet, Oral, Q24H  doxycycline, 100 mg, Oral, Q12H  DULoxetine, 20 mg, Oral, Daily  epoetin rylee/rylee-epbx, 20,000 Units, Subcutaneous, Weekly  famotidine, 20 mg, Oral, Every Other Day  ferric gluconate, 125 mg, Intravenous, Daily  hydrOXYzine, 25 mg, Oral, Nightly  levothyroxine, 50 mcg, Oral, Q AM  metoprolol tartrate, 12.5 mg, Oral, Q12H  midodrine, 15 mg, Oral, TID AC  mirtazapine, 15 mg, Oral, Nightly  nystatin, , Topical, Q12H  predniSONE, 5 mg, Oral, Daily With Breakfast  QUEtiapine, 25 mg, Oral, Nightly  sildenafil, 10 mg, Oral, TID  sodium chloride, 10 mL, Intravenous, Q12H  sodium chloride, 10 mL, Intravenous, Q12H  sodium chloride, 10 mL, Intravenous, Q12H  tacrolimus, 0.5 mg, Oral, BID        Infusions        PRN Medications   acetaminophen    senna-docusate sodium **AND** polyethylene glycol **AND** bisacodyl **AND** bisacodyl    diazePAM    HYDROcodone-acetaminophen    influenza vaccine    LORazepam    metoprolol tartrate    ondansetron    prochlorperazine    [COMPLETED] Insert Peripheral IV **AND** sodium chloride    sodium chloride    sodium chloride    sodium chloride    sodium chloride     Physical Findings        Trending Physical   Appearance, NFPE 12/9: NFPE completed and not consistent with nutrition diagnosis of malnutrition at this time using AND/ASPEN criteria -- but -- edema may be masking wasting at some sites. Will continue to follow up.    12/02: NFPE completed and not consistent with nutrition diagnosis of malnutrition at this time using AND/ASPEN " criteria     --  Edema  2+ generalized, knees  3+ arms, legs, ankles, feet      Bowel Function +BM 12/9     Tubes No tubes      Chewing/Swallowing No issues reported     Skin --small partial thickness wound to her right lateral lower leg  --full thickness wound to her right knee   --non-blanchable erythema to sacrum  (See WOCN note)    --  Current Nutrition Orders & Evaluation of Intake       Oral Nutrition     Food Allergies NKFA    Current PO Diet Diet: Regular/House, Renal; Low Potassium; Fluid Consistency: Thin (IDDSI 0)   Supplement Magic Cups at lunch/dinner (Provides 580 kcals, 18 g protein if consumed)      PO Evaluation     Trending % PO Intake 12/9: 75% at recent meals; at least 50% at most recent -- improved since prior assessment   12/02: 0% documented - pt had consumed ~25% at my visit    --  Nutritional Risk Screening        NRS-2002 Score          Nutrition Diagnosis         Nutrition Dx Problem 1 Inadequate oral intake related to decreased appetite as evidenced by consuming less than 75% of meals       Nutrition Dx Problem 2        Intervention Goal         Intervention Goal(s) To consume at least 75% of meals consistently, with tolerance      Nutrition Intervention        RD Action Will continue to repeat NFPE at intervals to screen for changes     Nutrition Prescription          Diet Prescription Regular diet with thin liquids -- at this point would not apply renal restrictions because phosphorus is only mildly elevated, and K+ is WNL -- pt with diminished appetite; regular diet remains appropriate     Supplement Prescription Magic Cups at lunch/dinner (Provides 580 kcals, 18 g protein if consumed)      --  Monitor/Evaluation        Monitor PO intake, Weight, GI status, POC/GOC     Electronically signed by:  Vivi Delatorre RD  12/09/24 17:32 EST

## 2024-12-09 NOTE — PLAN OF CARE
Bed mobility - Mod-A supine to sit   Transfers - Mod-A and Assist x 2, first transfer max assist x2 for stand pivot sit transfer to L with arm in arm assist.  Pt with small steps and shaky BLE noted during turning steps to chair. Subsequent tranfers completed with Mayra Chavez. 3 additional reps completed with Mayra Chavez with maxA x2 needed.  Poor hip extension and knee extension noted with all attempts.    Anticipated Discharge Disposition (PT): skilled nursing facility

## 2024-12-09 NOTE — PROGRESS NOTES
[facility] MEDICINE SERVICE  DAILY PROGRESS NOTE    NAME: Jaja Carcamo  : 1945  MRN: [id number]      LOS: 9 days     PROVIDER OF SERVICE: Hernesto Snyder MD    Chief Complaint: Acute deep vein thrombosis (DVT) of other vein of right upper extremity    Subjective:     Interval History:      Patient complaining of worsening lower extremity edema and dryness of her legs.  States that she urinated a small amount this morning but not very much.    Review of Systems  10 point ROS note except for above  Objective:     Vital Signs  Temp:  [96.2 °F (35.7 °C)-97.8 °F (36.6 °C)] 96.2 °F (35.7 °C)  Heart Rate:  [] 103  Resp:  [13-24] 18  BP: ()/(46-75) 94/68  Flow (L/min) (Oxygen Therapy):  [2] 2   Body mass index is 30.49 kg/m².    Physical Exam  General Appearance:  Alert, cooperative, no distress, appears stated age  Head:   Normocephalic, without obvious abnormality, atraumatic  Eyes:   PERRL, conjunctiva/corneas clear, EOM's intact, fundi benign, both eyes  Ears:    Normal TM's and external ear canals, both ears  Nose:Nares normal, septum midline, mucosa normal, no drainage or sinus tenderness  Throat:Lips, mucosa, and tongue normal; teeth and gums normal  Neck:Supple, symmetrical, trachea midline, no adenopathy, thyroid: not enlarged, symmetric, no tenderness/mass/nodules, no carotid bruit or JVD  Lungs:             Clear to auscultation bilaterally, respirations unlabored  Heart:  Regular rate and rhythm, S1, S2 normal, no murmur, rub or gallop  Abdomen:  Soft, non-tender, bowel sounds active all four quadrants,  no masses, no organomegaly  Extremities:RUE nonpitting edema with nonfunctioning AV fistula, enlargement of right upper arm veins  Pulses:2+ and symmetric  Skin:Skin color, texture, turgor normal, no rashes or lesions  Neurologic: Normal     Diagnostic Data    Results from last 7 days   Lab Units 24  1018 24  0352 24  0048   WBC 10*3/mm3  --   --  12.07*   HEMOGLOBIN  g/dL  --   --  7.9*   HEMATOCRIT %  --   --  27.9*   PLATELETS 10*3/mm3  --   --  180   GLUCOSE mg/dL 111*   < > 130*   CREATININE mg/dL 3.06*   < > 3.42*   BUN mg/dL 32*   < > 40*   SODIUM mmol/L 131*   < > 128*   POTASSIUM mmol/L 3.9   < > 5.1   ANION GAP mmol/L 11.6   < > 10.7    < > = values in this interval not displayed.       No radiology results for the last day      I reviewed the patient's new clinical results.    Assessment/Plan:     Active and Resolved Problems  Active Hospital Problems    Diagnosis  POA    **Acute deep vein thrombosis (DVT) of other vein of right upper extremity [I82.621]  Yes    Acute DVT (deep venous thrombosis) [I82.409]  Yes      Resolved Hospital Problems   No resolved problems to display.      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 as well as high-grade venous stenosis  -Patient was already on Eliquis  -Vascular surgery consulted; patient underwent balloon angioplasty of right cephalic vein and subclavian vein    TONYA  -Likely contrast-induced nephropathy with ATN  -Renal function worse despite IV fluid resuscitation and patient now appears to be volume overloaded  -Initiated on acute HD via her AV fistula; she may require prolonged HD on discharge if she does not have adequate renal function  -Continue ultrafiltration as well for fluid removal  -Wrap lower extremities given severe peripheral edema     Atrial flutter with RVR  -Restarted on her home diltiazem dose with adequate rate control  -Patient's anticoagulation was held initially and was started on heparin drip prior to procedure  -Restarted home Eliquis  -Use IV beta-blocker as necessary for heart rate control     COPD  -Stable  -Continue inhalers     History of renal transplant  -Continue Prograf per nephrology recommendations; adjust the dose based on Prograf levels     Hypothyroidism  -Continue Synthroid    VTE  Prophylaxis:  Pharmacologic VTE prophylaxis orders are present.    Disposition Planning:   Barriers to Discharge: Renal failure  Anticipated Date of Discharge: 12/13  Place of Discharge: home      Time: 35 minutes     There are no questions and answers to display.       Signature: Electronically signed by Hernesto Snyder MD, 12/09/24, 14:26 EST.  [facility]

## 2024-12-09 NOTE — PLAN OF CARE
"Goal Outcome Evaluation:      Assessment: Jaja Carcamo presents with ADL impairments affecting function including balance, cognition, endurance / activity tolerance, pain, range of motion (ROM), shortness of breath, and strength.  She becomes fatigued quickly with standing activity. Demonstrated functioning below baseline abilities indicate the need for continued skilled intervention while inpatient. Tolerating session today without incident. Will continue to follow and progress as tolerated.     Plan/Recommendations:   Moderate Intensity Therapy recommended post-acute care. This is recommended as therapy feels the patient would require 3-4 days per week and wouldn't tolerate \"3 hour daily\" rehab intensity. SNF would be the preferred choice. If the patient does not agree to SNF, arrange HH or OP depending on home bound status. If patient is medically complex, consider LTACH.. Pt requires no DME at discharge.                                       "

## 2024-12-09 NOTE — PROGRESS NOTES
Daily Progress Note          Assessment  Hypoxic respiratory insufficiency multifactorial   negative respiratory panel      right IJ DVT, brachial artery aneurysmal dilation/AV fistula  No PE on CAT scan  Pulm edema with bilateral pleural effusion left > right  A-fib with RVR was on Eliquis as an outpatient     Pulmonary hypertension  2D echo 10/4/2024  EF 58 % (Biplane Amato's method).   Severe biatrial enlargement.  moderate mitral regurgitation. moderate tricuspid regurgitation.   Calculated pulmonary systolic pressure of 72 mmHg.   Dilated inferior vena cava consistent with a mean right atrial pressure of 15  mmHg.      ? 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 difficile 6/7/2024  History of ESBL E. coli and 6/5/2024  Hypothyroidism  CAD  History of DVT     Pulmonary hypertension  HTN        Recommendations:     Respiratory status is close to her baseline     oxygen supplement and titration to maintain saturation 90-95%: Currently on 2 L per nasal cannula     Cont Low-dose Revatio 10 mg 3 times daily   midodrine 15 mg 3 times daily started we will continue to monitor blood pressure   No nitrates     Diuresis by nephrology  Immunosuppressive agents Prograf for renal transplant      Anticoagulation: Eliquis     Empiric antibiotics completed IV Zosyn     Avoid sedatives            Chest x-ray 12/1/2024                    LOS: 9 days     Subjective     Patient reports chronic shortness of breath    Objective     Vital signs for last 24 hours:  Vitals:    12/09/24 0644 12/09/24 0646 12/09/24 0824 12/09/24 0833   BP:  94/48 (!) 84/46 91/48   BP Location:  Left arm  Left arm   Patient Position:  Lying  Lying   Pulse:  87 88 88   Resp:  18     Temp:  96.2 °F (35.7 °C)     TempSrc:  Oral     SpO2:  100% 100% 96%   Weight: 85.7 kg (188 lb 15 oz)      Height:           Intake/Output last 3 shifts:  I/O last 3 completed shifts:  In: 480  [P.O.:480]  Out: 2000   Intake/Output this shift:  No intake/output data recorded.      Radiology  Imaging Results (Last 24 Hours)       ** No results found for the last 24 hours. **            Labs:  Results from last 7 days   Lab Units 12/06/24  0048   WBC 10*3/mm3 12.07*   HEMOGLOBIN g/dL 7.9*   HEMATOCRIT % 27.9*   PLATELETS 10*3/mm3 180     Results from last 7 days   Lab Units 12/08/24  0048   SODIUM mmol/L 128*   POTASSIUM mmol/L 5.3*   CHLORIDE mmol/L 96*   CO2 mmol/L 19.1*   BUN mg/dL 49*   CREATININE mg/dL 4.08*   CALCIUM mg/dL 9.3   GLUCOSE mg/dL 98           Results from last 7 days   Lab Units 12/08/24  0048 12/07/24  0352 12/06/24  0048   ALBUMIN g/dL 3.1* 3.1* 3.2*             Results from last 7 days   Lab Units 12/06/24  0048   MAGNESIUM mg/dL 1.9     Results from last 7 days   Lab Units 12/08/24  0048 12/07/24  0352 12/06/24  0048   APTT seconds 44.5* 46.3* 38.7*               Meds:   SCHEDULE  apixaban, 5 mg, Oral, BID  atorvastatin, 10 mg, Oral, Daily  cholestyramine light, 1 packet, Oral, Q24H  doxycycline, 100 mg, Oral, Q12H  DULoxetine, 20 mg, Oral, Daily  epoetin rylee/rylee-epbx, 20,000 Units, Subcutaneous, Weekly  famotidine, 20 mg, Oral, Every Other Day  ferric gluconate, 125 mg, Intravenous, Daily  hydrOXYzine, 25 mg, Oral, Nightly  levothyroxine, 50 mcg, Oral, Q AM  metoprolol tartrate, 12.5 mg, Oral, Q12H  midodrine, 15 mg, Oral, TID AC  mirtazapine, 15 mg, Oral, Nightly  nystatin, , Topical, Q12H  predniSONE, 5 mg, Oral, Daily With Breakfast  QUEtiapine, 25 mg, Oral, Nightly  sildenafil, 10 mg, Oral, TID  sodium chloride, 10 mL, Intravenous, Q12H  sodium chloride, 10 mL, Intravenous, Q12H  sodium chloride, 10 mL, Intravenous, Q12H  tacrolimus, 0.5 mg, Oral, BID      Infusions     PRNs    acetaminophen    senna-docusate sodium **AND** polyethylene glycol **AND** bisacodyl **AND** bisacodyl    diazePAM    HYDROcodone-acetaminophen    influenza vaccine    LORazepam    metoprolol tartrate     ondansetron    prochlorperazine    [COMPLETED] Insert Peripheral IV **AND** sodium chloride    sodium chloride    sodium chloride    sodium chloride    sodium chloride    Physical Exam:  General Appearance:  Alert: Looks chronically ill but not in acute distress  HEENT:  Normocephalic, without obvious abnormality, Conjunctiva/corneas clear,.   Nares normal, no drainage     Neck:  Supple, symmetrical, trachea midline.   Lungs /Chest wall:   Bilateral basal rhonchi, respirations unlabored, symmetrical wall movement.     Heart:  Regular rate and rhythm, S1 S2 normal  Abdomen: Soft, non-tender, no masses, no organomegaly.    Extremities: No edema, no clubbing or cyanosis     ROS  Constitutional: Negative for chills, fever and malaise/fatigue.   HENT: Negative.    Eyes: Negative.    Cardiovascular: Negative.    Respiratory: Positive for improving cough and shortness of breath.    Skin: Negative.    Musculoskeletal: Negative.    Gastrointestinal: Negative.    Genitourinary: Negative.    Neurological: Generalized weakness      I reviewed the recent clinical results  I personally reviewed the latest radiological studies    Part of this note may be an electronic transcription/translation of spoken language to printed text using the Dragon Dictation System.

## 2024-12-09 NOTE — CASE MANAGEMENT/SOCIAL WORK
Continued Stay Note  DAHIANA Gay     Patient Name: Jaja Carcamo  MRN: [id number]  Today's Date: 12/9/2024    Admit Date: 11/29/2024    Plan: DC PLAN: Return to [facility]. May need transport.   Discharge Plan       Row Name 12/09/24 0937       Plan    Patient/Family in Agreement with Plan yes    Plan Comments DC barriers: Hypotensive, monitor labs, Nephrology and Pulm following               Expected Discharge Date and Time       Expected Discharge Date Expected Discharge Time    Dec 7, 2024         Osiris Paulino RN    phone [phone]  fax [phone]

## 2024-12-09 NOTE — PLAN OF CARE
Goal Outcome Evaluation:  Plan of Care Reviewed With: patient        Progress: no change  Outcome Evaluation: pt has dialysis this shift,  2L n.c.,  right arm and both legs edema,  Q2 turn and pump on bed for skin health, rested off and on during the night

## 2024-12-10 LAB
ALBUMIN SERPL-MCNC: 3.1 G/DL (ref 3.5–5.2)
ANION GAP SERPL CALCULATED.3IONS-SCNC: 10.1 MMOL/L (ref 5–15)
BASOPHILS # BLD AUTO: 0.01 10*3/MM3 (ref 0–0.2)
BASOPHILS NFR BLD AUTO: 0.1 % (ref 0–1.5)
BUN SERPL-MCNC: 35 MG/DL (ref 8–23)
BUN/CREAT SERPL: 11.1 (ref 7–25)
CALCIUM SPEC-SCNC: 9.6 MG/DL (ref 8.6–10.5)
CHLORIDE SERPL-SCNC: 96 MMOL/L (ref 98–107)
CO2 SERPL-SCNC: 21.9 MMOL/L (ref 22–29)
CREAT SERPL-MCNC: 3.15 MG/DL (ref 0.57–1)
DEPRECATED RDW RBC AUTO: 60.3 FL (ref 37–54)
EGFRCR SERPLBLD CKD-EPI 2021: 14.7 ML/MIN/1.73
EOSINOPHIL # BLD AUTO: 0.02 10*3/MM3 (ref 0–0.4)
EOSINOPHIL NFR BLD AUTO: 0.2 % (ref 0.3–6.2)
ERYTHROCYTE [DISTWIDTH] IN BLOOD BY AUTOMATED COUNT: 21 % (ref 12.3–15.4)
GLUCOSE SERPL-MCNC: 106 MG/DL (ref 65–99)
HBV CORE AB SERPL QL IA: NEGATIVE
HCT VFR BLD AUTO: 25.5 % (ref 34–46.6)
HGB BLD-MCNC: 7.4 G/DL (ref 12–15.9)
IMM GRANULOCYTES # BLD AUTO: 0.2 10*3/MM3 (ref 0–0.05)
IMM GRANULOCYTES NFR BLD AUTO: 2.2 % (ref 0–0.5)
LYMPHOCYTES # BLD AUTO: 1.75 10*3/MM3 (ref 0.7–3.1)
LYMPHOCYTES NFR BLD AUTO: 19.3 % (ref 19.6–45.3)
MCH RBC QN AUTO: 26.1 PG (ref 26.6–33)
MCHC RBC AUTO-ENTMCNC: 29 G/DL (ref 31.5–35.7)
MCV RBC AUTO: 89.8 FL (ref 79–97)
MONOCYTES # BLD AUTO: 1.06 10*3/MM3 (ref 0.1–0.9)
MONOCYTES NFR BLD AUTO: 11.7 % (ref 5–12)
NEUTROPHILS NFR BLD AUTO: 6.04 10*3/MM3 (ref 1.7–7)
NEUTROPHILS NFR BLD AUTO: 66.5 % (ref 42.7–76)
NRBC BLD AUTO-RTO: 0.8 /100 WBC (ref 0–0.2)
PHOSPHATE SERPL-MCNC: 5.1 MG/DL (ref 2.5–4.5)
PLATELET # BLD AUTO: 150 10*3/MM3 (ref 140–450)
PMV BLD AUTO: 9.5 FL (ref 6–12)
POTASSIUM SERPL-SCNC: 4.1 MMOL/L (ref 3.5–5.2)
RBC # BLD AUTO: 2.84 10*6/MM3 (ref 3.77–5.28)
SODIUM SERPL-SCNC: 128 MMOL/L (ref 136–145)
WBC NRBC COR # BLD AUTO: 9.08 10*3/MM3 (ref 3.4–10.8)

## 2024-12-10 PROCEDURE — 85025 COMPLETE CBC W/AUTO DIFF WBC: CPT | Performed by: INTERNAL MEDICINE

## 2024-12-10 PROCEDURE — 63710000001 TACROLIMUS PER 1 MG: Performed by: INTERNAL MEDICINE

## 2024-12-10 PROCEDURE — 25010000002 NA FERRIC GLUC CPLX PER 12.5 MG: Performed by: INTERNAL MEDICINE

## 2024-12-10 PROCEDURE — 80069 RENAL FUNCTION PANEL: CPT | Performed by: STUDENT IN AN ORGANIZED HEALTH CARE EDUCATION/TRAINING PROGRAM

## 2024-12-10 PROCEDURE — 80197 ASSAY OF TACROLIMUS: CPT | Performed by: INTERNAL MEDICINE

## 2024-12-10 PROCEDURE — 25010000002 EPOETIN ALFA-EPBX 20000 UNIT/ML SOLUTION: Performed by: INTERNAL MEDICINE

## 2024-12-10 PROCEDURE — 63710000001 PREDNISONE PER 5 MG: Performed by: STUDENT IN AN ORGANIZED HEALTH CARE EDUCATION/TRAINING PROGRAM

## 2024-12-10 RX ORDER — LIDOCAINE 40 MG/G
1 CREAM TOPICAL 3 TIMES WEEKLY
Status: DISCONTINUED | OUTPATIENT
Start: 2024-12-11 | End: 2024-12-19 | Stop reason: HOSPADM

## 2024-12-10 RX ADMIN — TACROLIMUS 0.5 MG: 0.5 CAPSULE ORAL at 11:08

## 2024-12-10 RX ADMIN — DULOXETINE HYDROCHLORIDE 20 MG: 20 CAPSULE, DELAYED RELEASE ORAL at 11:09

## 2024-12-10 RX ADMIN — Medication 10 ML: at 20:37

## 2024-12-10 RX ADMIN — LEVOTHYROXINE SODIUM 50 MCG: 50 TABLET ORAL at 05:20

## 2024-12-10 RX ADMIN — ATORVASTATIN CALCIUM 10 MG: 10 TABLET ORAL at 11:09

## 2024-12-10 RX ADMIN — APIXABAN 5 MG: 5 TABLET, FILM COATED ORAL at 20:36

## 2024-12-10 RX ADMIN — SILDENAFIL 10 MG: 20 TABLET ORAL at 20:36

## 2024-12-10 RX ADMIN — Medication 10 ML: at 11:09

## 2024-12-10 RX ADMIN — SODIUM CHLORIDE 125 MG: 9 INJECTION, SOLUTION INTRAVENOUS at 11:09

## 2024-12-10 RX ADMIN — NYSTATIN: 100000 POWDER TOPICAL at 20:37

## 2024-12-10 RX ADMIN — TACROLIMUS 0.5 MG: 0.5 CAPSULE ORAL at 20:36

## 2024-12-10 RX ADMIN — MIDODRINE HYDROCHLORIDE 15 MG: 5 TABLET ORAL at 17:34

## 2024-12-10 RX ADMIN — Medication 12.5 MG: at 20:36

## 2024-12-10 RX ADMIN — EPOETIN ALFA-EPBX 20000 UNITS: 20000 INJECTION, SOLUTION INTRAVENOUS; SUBCUTANEOUS at 11:09

## 2024-12-10 RX ADMIN — MIDODRINE HYDROCHLORIDE 15 MG: 5 TABLET ORAL at 11:08

## 2024-12-10 RX ADMIN — CHOLESTYRAMINE 4 G: 4 POWDER, FOR SUSPENSION ORAL at 11:08

## 2024-12-10 RX ADMIN — QUETIAPINE FUMARATE 25 MG: 25 TABLET ORAL at 20:36

## 2024-12-10 RX ADMIN — FAMOTIDINE 20 MG: 20 TABLET, FILM COATED ORAL at 11:08

## 2024-12-10 RX ADMIN — HYDROXYZINE HYDROCHLORIDE 25 MG: 25 TABLET ORAL at 20:36

## 2024-12-10 RX ADMIN — NYSTATIN: 100000 POWDER TOPICAL at 11:09

## 2024-12-10 RX ADMIN — Medication 12.5 MG: at 11:09

## 2024-12-10 RX ADMIN — APIXABAN 5 MG: 5 TABLET, FILM COATED ORAL at 11:08

## 2024-12-10 RX ADMIN — SILDENAFIL 10 MG: 20 TABLET ORAL at 17:35

## 2024-12-10 RX ADMIN — MIRTAZAPINE 15 MG: 15 TABLET, FILM COATED ORAL at 20:36

## 2024-12-10 RX ADMIN — PREDNISONE 5 MG: 5 TABLET ORAL at 11:09

## 2024-12-10 NOTE — PROGRESS NOTES
Daily Progress Note          Assessment  Hypoxic respiratory insufficiency multifactorial   negative respiratory panel      right IJ DVT, brachial artery aneurysmal dilation/AV fistula  No PE on CAT scan  Pulm edema with bilateral pleural effusion left > right  A-fib with RVR was on Eliquis as an outpatient     Pulmonary hypertension  2D echo 10/4/2024  EF 58 % (Biplane Amato's method).   Severe biatrial enlargement.  moderate mitral regurgitation. moderate tricuspid regurgitation.   Calculated pulmonary systolic pressure of 72 mmHg.   Dilated inferior vena cava consistent with a mean right atrial pressure of 15  mmHg.      ? 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 difficile 6/7/2024  History of ESBL E. coli and 6/5/2024  Hypothyroidism  CAD  History of DVT     Pulmonary hypertension  HTN        Recommendations:     Respiratory status is close to her baseline     oxygen supplement and titration to maintain saturation 90-95%: Currently on 2 L per nasal cannula     Cont Low-dose Revatio 10 mg 3 times daily   midodrine 15 mg 3 times daily started we will continue to monitor blood pressure   No nitrates     Diuresis by nephrology  Immunosuppressive agents Prograf for renal transplant      Anticoagulation: Eliquis     Empiric antibiotics completed IV Zosyn     Avoid sedatives            Chest x-ray 12/1/2024                    LOS: 10 days     Subjective     Patient reports chronic shortness of breath    Objective     Vital signs for last 24 hours:  Vitals:    12/09/24 2345 12/10/24 0402 12/10/24 1020 12/10/24 1636   BP: 95/46 109/63 98/66 100/79   BP Location: Left arm Left arm Left arm Left arm   Patient Position: Lying Lying Lying Lying   Pulse: 95 92 108 95   Resp: 24 11 16 16   Temp: 98 °F (36.7 °C) 97.7 °F (36.5 °C) 97.7 °F (36.5 °C)    TempSrc: Axillary Oral Oral    SpO2: 96% 100% 93%    Weight:       Height:           Intake/Output  last 3 shifts:  I/O last 3 completed shifts:  In: 960 [P.O.:960]  Out: 2000   Intake/Output this shift:  I/O this shift:  In: 240 [P.O.:240]  Out: -       Radiology  Imaging Results (Last 24 Hours)       ** No results found for the last 24 hours. **            Labs:  Results from last 7 days   Lab Units 12/10/24  0313   WBC 10*3/mm3 9.08   HEMOGLOBIN g/dL 7.4*   HEMATOCRIT % 25.5*   PLATELETS 10*3/mm3 150     Results from last 7 days   Lab Units 12/10/24  0313   SODIUM mmol/L 128*   POTASSIUM mmol/L 4.1   CHLORIDE mmol/L 96*   CO2 mmol/L 21.9*   BUN mg/dL 35*   CREATININE mg/dL 3.15*   CALCIUM mg/dL 9.6   GLUCOSE mg/dL 106*           Results from last 7 days   Lab Units 12/10/24  0313 12/09/24  1018 12/08/24  0048   ALBUMIN g/dL 3.1* 2.9* 3.1*             Results from last 7 days   Lab Units 12/06/24  0048   MAGNESIUM mg/dL 1.9     Results from last 7 days   Lab Units 12/09/24  1018 12/08/24  0048 12/07/24  0352   APTT seconds 62.0* 44.5* 46.3*               Meds:   SCHEDULE  apixaban, 5 mg, Oral, BID  atorvastatin, 10 mg, Oral, Daily  cholestyramine light, 1 packet, Oral, Q24H  DULoxetine, 20 mg, Oral, Daily  epoetin rylee/rylee-epbx, 20,000 Units, Subcutaneous, Once per day on Monday Wednesday Friday  famotidine, 20 mg, Oral, Every Other Day  hydrOXYzine, 25 mg, Oral, Nightly  levothyroxine, 50 mcg, Oral, Q AM  [START ON 12/11/2024] lidocaine, 1 Application, Topical, Once per day on Monday Wednesday Friday  metoprolol tartrate, 12.5 mg, Oral, Q12H  midodrine, 15 mg, Oral, TID AC  mirtazapine, 15 mg, Oral, Nightly  nystatin, , Topical, Q12H  predniSONE, 5 mg, Oral, Daily With Breakfast  QUEtiapine, 25 mg, Oral, Nightly  sildenafil, 10 mg, Oral, TID  sodium chloride, 10 mL, Intravenous, Q12H  sodium chloride, 10 mL, Intravenous, Q12H  sodium chloride, 10 mL, Intravenous, Q12H  tacrolimus, 0.5 mg, Oral, BID      Infusions     PRNs    acetaminophen    senna-docusate sodium **AND** polyethylene glycol **AND** bisacodyl  **AND** bisacodyl    diazePAM    influenza vaccine    LORazepam    metoprolol tartrate    ondansetron    oxyCODONE    prochlorperazine    [COMPLETED] Insert Peripheral IV **AND** sodium chloride    sodium chloride    sodium chloride    sodium chloride    sodium chloride    Physical Exam:  General Appearance:  Alert: Looks chronically ill but not in acute distress  HEENT:  Normocephalic, without obvious abnormality, Conjunctiva/corneas clear,.   Nares normal, no drainage     Neck:  Supple, symmetrical, trachea midline.   Lungs /Chest wall:   Bilateral basal rhonchi, respirations unlabored, symmetrical wall movement.     Heart:  Regular rate and rhythm, S1 S2 normal  Abdomen: Soft, non-tender, no masses, no organomegaly.    Extremities: No edema, no clubbing or cyanosis     ROS  Constitutional: Negative for chills, fever and malaise/fatigue.   HENT: Negative.    Eyes: Negative.    Cardiovascular: Negative.    Respiratory: Positive for improving cough and shortness of breath.    Skin: Negative.    Musculoskeletal: Negative.    Gastrointestinal: Negative.    Genitourinary: Negative.    Neurological: Generalized weakness      I reviewed the recent clinical results  I personally reviewed the latest radiological studies    Part of this note may be an electronic transcription/translation of spoken language to printed text using the Dragon Dictation System.

## 2024-12-10 NOTE — CASE MANAGEMENT/SOCIAL WORK
Continued Stay Note  DAHIANA Gay     Patient Name: Jaja Carcaom  MRN: [id number]  Today's Date: 12/10/2024    Admit Date: 11/29/2024    Plan: Rtn to [facility]. No PASRR or precert needed. Watch for dialysis needs at d/c. May need transport   Discharge Plan       Row Name 12/10/24 1303       Plan    Plan Rtn to [facility]. No PASRR or precert needed. Watch for dialysis needs at d/c. May need transport    Patient/Family in Agreement with Plan yes    Plan Comments Rtn to [facility]. Watch for dialysis needs at d/c. DC Barriers: Nephrology following             Expected Discharge Date and Time       Expected Discharge Date Expected Discharge Time    Dec 11, 2024         Osiris Paulino RN    phone [phone]  fax [phone]

## 2024-12-10 NOTE — PROGRESS NOTES
[facility] MEDICINE SERVICE  DAILY PROGRESS NOTE    NAME: Jaja Carcamo  : 1945  MRN: [id number]      LOS: 10 days     PROVIDER OF SERVICE: Hernesto Snyder MD    Chief Complaint: Acute deep vein thrombosis (DVT) of other vein of right upper extremity    Subjective:     Interval History:      Patient was somewhat agitated this morning and refusing dialysis.    Review of Systems  10 point ROS note except for above  Objective:     Vital Signs  Temp:  [97.6 °F (36.4 °C)-98 °F (36.7 °C)] 97.7 °F (36.5 °C)  Heart Rate:  [] 108  Resp:  [11-24] 16  BP: ()/(46-66) 98/66  Flow (L/min) (Oxygen Therapy):  [2] 2   Body mass index is 30.49 kg/m².    Physical Exam  General Appearance:  Alert, cooperative, no distress, appears stated age  Head:   Normocephalic, without obvious abnormality, atraumatic  Eyes:   PERRL, conjunctiva/corneas clear, EOM's intact, fundi benign, both eyes  Ears:    Normal TM's and external ear canals, both ears  Nose:Nares normal, septum midline, mucosa normal, no drainage or sinus tenderness  Throat:Lips, mucosa, and tongue normal; teeth and gums normal  Neck:Supple, symmetrical, trachea midline, no adenopathy, thyroid: not enlarged, symmetric, no tenderness/mass/nodules, no carotid bruit or JVD  Lungs:             Clear to auscultation bilaterally, respirations unlabored  Heart:  Regular rate and rhythm, S1, S2 normal, no murmur, rub or gallop  Abdomen:  Soft, non-tender, bowel sounds active all four quadrants,  no masses, no organomegaly  Extremities:RUE nonpitting edema with nonfunctioning AV fistula, enlargement of right upper arm veins  Pulses:2+ and symmetric  Skin:Skin color, texture, turgor normal, no rashes or lesions  Neurologic: Normal     Diagnostic Data    Results from last 7 days   Lab Units 12/10/24  0313   WBC 10*3/mm3 9.08   HEMOGLOBIN g/dL 7.4*   HEMATOCRIT % 25.5*   PLATELETS 10*3/mm3 150   GLUCOSE mg/dL 106*   CREATININE mg/dL 3.15*   BUN mg/dL 35*   SODIUM  mmol/L 128*   POTASSIUM mmol/L 4.1   ANION GAP mmol/L 10.1       No radiology results for the last day      I reviewed the patient's new clinical results.    Assessment/Plan:     Active and Resolved Problems  Active Hospital Problems    Diagnosis  POA    **Acute deep vein thrombosis (DVT) of other vein of right upper extremity [I82.621]  Yes    Acute DVT (deep venous thrombosis) [I82.409]  Yes      Resolved Hospital Problems   No resolved problems to display.      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 as well as high-grade venous stenosis  -Patient was already on Eliquis  -Vascular surgery consulted; patient underwent balloon angioplasty of right cephalic vein and subclavian vein    TONYA  -Likely contrast-induced nephropathy with ATN  -Renal function worse despite IV fluid resuscitation and patient now appears to be volume overloaded  -Initiated on acute HD via her AV fistula; she may require prolonged HD on discharge if she does not have adequate return of renal function  -Continue ultrafiltration as well for fluid removal  -Wrap lower extremities given severe peripheral edema     Atrial flutter with RVR  -Restarted on her home diltiazem dose with adequate rate control  -Patient's anticoagulation was held initially and was started on heparin drip prior to procedure  -Restarted home Eliquis  -Use IV beta-blocker as necessary for heart rate control     COPD  -Stable  -Continue inhalers     History of renal transplant  -Continue Prograf per nephrology recommendations; adjust the dose based on Prograf levels     Hypothyroidism  -Continue Synthroid    VTE Prophylaxis:  Pharmacologic VTE prophylaxis orders are present.    Disposition Planning:   Barriers to Discharge: Renal failure  Anticipated Date of Discharge: 12/16  Place of Discharge: home      Time: 35 minutes     There are no questions and answers to display.       Signature:  Electronically signed by Hernesto Snyder MD, 12/10/24, 14:02 EST.  [facility]

## 2024-12-10 NOTE — PROGRESS NOTES
"RENAL/KCC:     LOS: 10 days    Patient Care Team:  Kvng Guillen MD as PCP - General (Family Medicine)  Jak Gavin MD as Cardiologist (Cardiology)    Chief Complaint:  TONYA/CKD, Kidney transplant    Subjective     Interval History:   Chart reviewed  Seen in dialysis this AM  Confused and refusing treatment    Objective     Vital Sign Min/Max for last 24 hours  Temp  Min: 97.6 °F (36.4 °C)  Max: 98 °F (36.7 °C)   BP  Min: 84/46  Max: 113/55   Pulse  Min: 88  Max: 103   Resp  Min: 11  Max: 24   SpO2  Min: 96 %  Max: 100 %   Flow (L/min) (Oxygen Therapy)  Min: 2  Max: 2   No data recorded     Flowsheet Rows      Flowsheet Row First Filed Value   Admission Height 167.6 cm (66\") Documented at 11/29/2024 1238   Admission Weight 74.5 kg (164 lb 3.9 oz) Documented at 11/29/2024 1238            No intake/output data recorded.  I/O last 3 completed shifts:  In: 960 [P.O.:960]  Out: 2000     Physical Exam:  GEN: Awake, NAD, Confused  ENT: PERRL, EOMI, MMM  NECK: Supple, no JVD  CHEST: CTAB, no W/R/C  CV: RRR, no M/G/R, +edema  ABD: Soft, NT, +BS  SKIN: Warm and Dry  NEURO: CN's intact      WBC WBC   Date Value Ref Range Status   12/10/2024 9.08 3.40 - 10.80 10*3/mm3 Final        HGB Hemoglobin   Date Value Ref Range Status   12/10/2024 7.4 (L) 12.0 - 15.9 g/dL Final        HCT Hematocrit   Date Value Ref Range Status   12/10/2024 25.5 (L) 34.0 - 46.6 % Final        Platlets No results found for: \"LABPLAT\"   MCV MCV   Date Value Ref Range Status   12/10/2024 89.8 79.0 - 97.0 fL Final            Sodium Sodium   Date Value Ref Range Status   12/10/2024 128 (L) 136 - 145 mmol/L Final   12/09/2024 131 (L) 136 - 145 mmol/L Final   12/08/2024 128 (L) 136 - 145 mmol/L Final      Potassium Potassium   Date Value Ref Range Status   12/10/2024 4.1 3.5 - 5.2 mmol/L Final   12/09/2024 3.9 3.5 - 5.2 mmol/L Final     Comment:     Result checked     12/08/2024 5.3 (H) 3.5 - 5.2 mmol/L Final     Comment:     Specimen hemolyzed.  Result " "may be falsely elevated.      Chloride Chloride   Date Value Ref Range Status   12/10/2024 96 (L) 98 - 107 mmol/L Final   12/09/2024 98 98 - 107 mmol/L Final   12/08/2024 96 (L) 98 - 107 mmol/L Final      CO2 CO2   Date Value Ref Range Status   12/10/2024 21.9 (L) 22.0 - 29.0 mmol/L Final   12/09/2024 21.4 (L) 22.0 - 29.0 mmol/L Final   12/08/2024 19.1 (L) 22.0 - 29.0 mmol/L Final      BUN BUN   Date Value Ref Range Status   12/10/2024 35 (H) 8 - 23 mg/dL Final   12/09/2024 32 (H) 8 - 23 mg/dL Final   12/08/2024 49 (H) 8 - 23 mg/dL Final      Creatinine Creatinine   Date Value Ref Range Status   12/10/2024 3.15 (H) 0.57 - 1.00 mg/dL Final   12/09/2024 3.06 (H) 0.57 - 1.00 mg/dL Final   12/08/2024 4.08 (H) 0.57 - 1.00 mg/dL Final      Calcium Calcium   Date Value Ref Range Status   12/10/2024 9.6 8.6 - 10.5 mg/dL Final   12/09/2024 9.0 8.6 - 10.5 mg/dL Final   12/08/2024 9.3 8.6 - 10.5 mg/dL Final      PO4 No results found for: \"CAPO4\"   Albumin Albumin   Date Value Ref Range Status   12/10/2024 3.1 (L) 3.5 - 5.2 g/dL Final   12/09/2024 2.9 (L) 3.5 - 5.2 g/dL Final   12/08/2024 3.1 (L) 3.5 - 5.2 g/dL Final      Magnesium No results found for: \"MG\"     Uric Acid No results found for: \"URICACID\"        Results Review:     I reviewed the patient's new clinical results.    apixaban, 5 mg, Oral, BID  atorvastatin, 10 mg, Oral, Daily  cholestyramine light, 1 packet, Oral, Q24H  DULoxetine, 20 mg, Oral, Daily  epoetin rylee/rylee-epbx, 20,000 Units, Subcutaneous, Once per day on Monday Wednesday Friday  famotidine, 20 mg, Oral, Every Other Day  ferric gluconate, 125 mg, Intravenous, Daily  hydrOXYzine, 25 mg, Oral, Nightly  levothyroxine, 50 mcg, Oral, Q AM  metoprolol tartrate, 12.5 mg, Oral, Q12H  midodrine, 15 mg, Oral, TID AC  mirtazapine, 15 mg, Oral, Nightly  nystatin, , Topical, Q12H  predniSONE, 5 mg, Oral, Daily With Breakfast  QUEtiapine, 25 mg, Oral, Nightly  sildenafil, 10 mg, Oral, TID  sodium chloride, 10 mL, " Intravenous, Q12H  sodium chloride, 10 mL, Intravenous, Q12H  sodium chloride, 10 mL, Intravenous, Q12H  tacrolimus, 0.5 mg, Oral, BID             Medication Review: Reviewed    Assessment & Plan     1) Kidney Transplant - On Prograf  2) TONYA - HARRY/ATN, Baseline Cr under 1.  3) DVT  4) Hypotension  5) Anemia of CKD  6) AMS    Plan: ATN now requiring acute dialysis.  Hold HD this AM due to AMS.  Try again tomorrow as long as Cr still rising.  Midodrine for BP support.  Transplant US with no hydro and no arterial or venous stenosis.  Prograf level was 3.4 on 11/30 and jumped to 15.2 on 12/3.  Have decreased to 0.5 mg BID and repeat level pending.  On AC and Vascular following.  Repleting iron.  Epo ordered. ABX per primary.  Pulm following as well.        Naun Falcon MD  [facility]  12/10/24  07:40 EST

## 2024-12-10 NOTE — PLAN OF CARE
Goal Outcome Evaluation:  Plan of Care Reviewed With: patient        Progress: no change  Outcome Evaluation: Patient remains disoriented to situation. Refused HD this morning. HD rescheduled for the AM. Extremities remain edematous and weeping. No complaints of pain thus far. Patient repositioned by staff every two hours to prevent skin breakdown. Fall precautions remain in place and call light within reach.

## 2024-12-10 NOTE — PLAN OF CARE
Goal Outcome Evaluation:  Plan of Care Reviewed With: patient        Progress: no change  Outcome Evaluation: Slept at intervals. O2 2L in use. PRN pain meds given for right foot and bilateral leg pain. Extremities edematous and weeping. External catheter in use. Will continue to monitor.

## 2024-12-10 NOTE — SIGNIFICANT NOTE
12/10/24 1044   OTHER   Discipline physical therapist   Rehab Time/Intention   Session Not Performed patient unavailable for treatment  (off the floor.)   Therapy Assessment/Plan (PT)   Criteria for Skilled Interventions Met (PT) yes;skilled treatment is necessary   Recommendation   PT - Next Appointment 12/11/24

## 2024-12-11 ENCOUNTER — APPOINTMENT (OUTPATIENT)
Dept: NEPHROLOGY | Facility: HOSPITAL | Age: 79
End: 2024-12-11
Payer: MEDICARE

## 2024-12-11 LAB
ALBUMIN SERPL-MCNC: 3 G/DL (ref 3.5–5.2)
ANION GAP SERPL CALCULATED.3IONS-SCNC: 9.7 MMOL/L (ref 5–15)
BASOPHILS # BLD AUTO: 0.01 10*3/MM3 (ref 0–0.2)
BASOPHILS NFR BLD AUTO: 0.1 % (ref 0–1.5)
BUN SERPL-MCNC: 40 MG/DL (ref 8–23)
BUN/CREAT SERPL: 11.2 (ref 7–25)
CALCIUM SPEC-SCNC: 8.9 MG/DL (ref 8.6–10.5)
CHLORIDE SERPL-SCNC: 98 MMOL/L (ref 98–107)
CO2 SERPL-SCNC: 20.3 MMOL/L (ref 22–29)
CREAT SERPL-MCNC: 3.57 MG/DL (ref 0.57–1)
DEPRECATED RDW RBC AUTO: 63.2 FL (ref 37–54)
EGFRCR SERPLBLD CKD-EPI 2021: 12.6 ML/MIN/1.73
EOSINOPHIL # BLD AUTO: 0.01 10*3/MM3 (ref 0–0.4)
EOSINOPHIL NFR BLD AUTO: 0.1 % (ref 0.3–6.2)
ERYTHROCYTE [DISTWIDTH] IN BLOOD BY AUTOMATED COUNT: 21.4 % (ref 12.3–15.4)
GLUCOSE SERPL-MCNC: 104 MG/DL (ref 65–99)
HCT VFR BLD AUTO: 25 % (ref 34–46.6)
HGB BLD-MCNC: 7.3 G/DL (ref 12–15.9)
IMM GRANULOCYTES # BLD AUTO: 0.17 10*3/MM3 (ref 0–0.05)
IMM GRANULOCYTES NFR BLD AUTO: 2.2 % (ref 0–0.5)
LYMPHOCYTES # BLD AUTO: 1.19 10*3/MM3 (ref 0.7–3.1)
LYMPHOCYTES NFR BLD AUTO: 15.2 % (ref 19.6–45.3)
MCH RBC QN AUTO: 26.8 PG (ref 26.6–33)
MCHC RBC AUTO-ENTMCNC: 29.2 G/DL (ref 31.5–35.7)
MCV RBC AUTO: 91.9 FL (ref 79–97)
MONOCYTES # BLD AUTO: 0.71 10*3/MM3 (ref 0.1–0.9)
MONOCYTES NFR BLD AUTO: 9.1 % (ref 5–12)
NEUTROPHILS NFR BLD AUTO: 5.72 10*3/MM3 (ref 1.7–7)
NEUTROPHILS NFR BLD AUTO: 73.3 % (ref 42.7–76)
NRBC BLD AUTO-RTO: 0.6 /100 WBC (ref 0–0.2)
PHOSPHATE SERPL-MCNC: 6 MG/DL (ref 2.5–4.5)
PLATELET # BLD AUTO: 150 10*3/MM3 (ref 140–450)
PMV BLD AUTO: 9.6 FL (ref 6–12)
POTASSIUM SERPL-SCNC: 4.4 MMOL/L (ref 3.5–5.2)
RBC # BLD AUTO: 2.72 10*6/MM3 (ref 3.77–5.28)
SODIUM SERPL-SCNC: 128 MMOL/L (ref 136–145)
TACROLIMUS BLD LC/MS/MS-MCNC: 17.6 NG/ML (ref 2–20)
WBC NRBC COR # BLD AUTO: 7.81 10*3/MM3 (ref 3.4–10.8)

## 2024-12-11 PROCEDURE — 85025 COMPLETE CBC W/AUTO DIFF WBC: CPT | Performed by: INTERNAL MEDICINE

## 2024-12-11 PROCEDURE — 80197 ASSAY OF TACROLIMUS: CPT | Performed by: INTERNAL MEDICINE

## 2024-12-11 PROCEDURE — 97110 THERAPEUTIC EXERCISES: CPT

## 2024-12-11 PROCEDURE — 25010000002 EPOETIN ALFA-EPBX 20000 UNIT/ML SOLUTION: Performed by: INTERNAL MEDICINE

## 2024-12-11 PROCEDURE — 63710000001 PREDNISONE PER 5 MG: Performed by: STUDENT IN AN ORGANIZED HEALTH CARE EDUCATION/TRAINING PROGRAM

## 2024-12-11 PROCEDURE — 80069 RENAL FUNCTION PANEL: CPT | Performed by: STUDENT IN AN ORGANIZED HEALTH CARE EDUCATION/TRAINING PROGRAM

## 2024-12-11 RX ORDER — TACROLIMUS 0.5 MG/1
0.5 CAPSULE ORAL DAILY
Status: DISCONTINUED | OUTPATIENT
Start: 2024-12-12 | End: 2024-12-16

## 2024-12-11 RX ADMIN — Medication 10 ML: at 11:18

## 2024-12-11 RX ADMIN — MIDODRINE HYDROCHLORIDE 15 MG: 5 TABLET ORAL at 16:40

## 2024-12-11 RX ADMIN — MIDODRINE HYDROCHLORIDE 15 MG: 5 TABLET ORAL at 11:17

## 2024-12-11 RX ADMIN — DULOXETINE HYDROCHLORIDE 20 MG: 20 CAPSULE, DELAYED RELEASE ORAL at 11:17

## 2024-12-11 RX ADMIN — HYDROXYZINE HYDROCHLORIDE 25 MG: 25 TABLET ORAL at 20:20

## 2024-12-11 RX ADMIN — Medication 12.5 MG: at 11:17

## 2024-12-11 RX ADMIN — EPOETIN ALFA-EPBX 20000 UNITS: 20000 INJECTION, SOLUTION INTRAVENOUS; SUBCUTANEOUS at 15:55

## 2024-12-11 RX ADMIN — NYSTATIN: 100000 POWDER TOPICAL at 20:22

## 2024-12-11 RX ADMIN — APIXABAN 5 MG: 5 TABLET, FILM COATED ORAL at 11:17

## 2024-12-11 RX ADMIN — ATORVASTATIN CALCIUM 10 MG: 10 TABLET ORAL at 11:17

## 2024-12-11 RX ADMIN — NYSTATIN: 100000 POWDER TOPICAL at 11:19

## 2024-12-11 RX ADMIN — Medication 10 ML: at 20:20

## 2024-12-11 RX ADMIN — Medication 12.5 MG: at 20:21

## 2024-12-11 RX ADMIN — DIAZEPAM 5 MG: 5 TABLET ORAL at 23:29

## 2024-12-11 RX ADMIN — OXYCODONE 10 MG: 5 TABLET ORAL at 11:17

## 2024-12-11 RX ADMIN — OXYCODONE 10 MG: 5 TABLET ORAL at 21:43

## 2024-12-11 RX ADMIN — PREDNISONE 5 MG: 5 TABLET ORAL at 11:17

## 2024-12-11 RX ADMIN — MIRTAZAPINE 15 MG: 15 TABLET, FILM COATED ORAL at 20:21

## 2024-12-11 RX ADMIN — SILDENAFIL 10 MG: 20 TABLET ORAL at 11:17

## 2024-12-11 RX ADMIN — LEVOTHYROXINE SODIUM 50 MCG: 50 TABLET ORAL at 05:10

## 2024-12-11 RX ADMIN — QUETIAPINE FUMARATE 25 MG: 25 TABLET ORAL at 20:21

## 2024-12-11 RX ADMIN — APIXABAN 5 MG: 5 TABLET, FILM COATED ORAL at 20:21

## 2024-12-11 RX ADMIN — OXYCODONE 10 MG: 5 TABLET ORAL at 17:56

## 2024-12-11 RX ADMIN — SILDENAFIL 10 MG: 20 TABLET ORAL at 20:21

## 2024-12-11 NOTE — NURSING NOTE
Treatment completed. Tolerated 1.4L fluid removal. Report given to GHASSAN Schwartz. In no apparent distress.

## 2024-12-11 NOTE — PROGRESS NOTES
[facility] MEDICINE SERVICE  DAILY PROGRESS NOTE    NAME: Jaja Carcamo  : 1945  MRN: [id number]      LOS: 11 days     PROVIDER OF SERVICE: Hernesto Snyder MD    Chief Complaint: Acute deep vein thrombosis (DVT) of other vein of right upper extremity    Subjective:     Interval History:      Patient is much more alert and pleasant today.  She still complains of lower extremity pain and swelling.    Review of Systems  10 point ROS note except for above  Objective:     Vital Signs  Temp:  [97.2 °F (36.2 °C)-98.3 °F (36.8 °C)] 97.6 °F (36.4 °C)  Heart Rate:  [87-98] 98  Resp:  [13-18] 16  BP: ()/(44-94) 97/44  Flow (L/min) (Oxygen Therapy):  [2] 2   Body mass index is 30.49 kg/m².    Physical Exam  General Appearance:  Alert, cooperative, no distress, appears stated age  Head:   Normocephalic, without obvious abnormality, atraumatic  Eyes:   PERRL, conjunctiva/corneas clear, EOM's intact, fundi benign, both eyes  Ears:    Normal TM's and external ear canals, both ears  Nose:Nares normal, septum midline, mucosa normal, no drainage or sinus tenderness  Throat:Lips, mucosa, and tongue normal; teeth and gums normal  Neck:Supple, symmetrical, trachea midline, no adenopathy, thyroid: not enlarged, symmetric, no tenderness/mass/nodules, no carotid bruit or JVD  Lungs:             Clear to auscultation bilaterally, respirations unlabored  Heart:  Regular rate and rhythm, S1, S2 normal, no murmur, rub or gallop  Abdomen:  Soft, non-tender, bowel sounds active all four quadrants,  no masses, no organomegaly  Extremities:RUE nonpitting edema with nonfunctioning AV fistula, enlargement of right upper arm veins  Pulses:2+ and symmetric  Skin:Skin color, texture, turgor normal, no rashes or lesions  Neurologic: Normal     Diagnostic Data    Results from last 7 days   Lab Units 24  0301   WBC 10*3/mm3 7.81   HEMOGLOBIN g/dL 7.3*   HEMATOCRIT % 25.0*   PLATELETS 10*3/mm3 150   GLUCOSE mg/dL 104*   CREATININE  mg/dL 3.57*   BUN mg/dL 40*   SODIUM mmol/L 128*   POTASSIUM mmol/L 4.4   ANION GAP mmol/L 9.7       No radiology results for the last day      I reviewed the patient's new clinical results.    Assessment/Plan:     Active and Resolved Problems  Active Hospital Problems    Diagnosis  POA    **Acute deep vein thrombosis (DVT) of other vein of right upper extremity [I82.621]  Yes    Acute DVT (deep venous thrombosis) [I82.409]  Yes      Resolved Hospital Problems   No resolved problems to display.      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 as well as high-grade venous stenosis  -Patient was already on Eliquis  -Vascular surgery consulted; patient underwent balloon angioplasty of right cephalic vein and subclavian vein    TONYA  -Likely contrast-induced nephropathy with ATN  -Renal function worse despite IV fluid resuscitation and patient now appears to be volume overloaded  -Initiated on acute HD via her AV fistula; she may require prolonged HD on discharge if she does not have adequate return of renal function  -Continue ultrafiltration as well for fluid removal  -Patient has severe peripheral edema of the lower extremities with weeping     Atrial flutter with RVR  -Restarted on her home diltiazem dose with adequate rate control  -Patient's anticoagulation was held initially and was started on heparin drip prior to procedure  -Restarted home Eliquis  -Use IV beta-blocker as necessary for heart rate control     COPD  -Stable  -Continue inhalers     History of renal transplant  -Continue Prograf per nephrology recommendations; adjust the dose based on Prograf levels     Hypothyroidism  -Continue Synthroid    VTE Prophylaxis:  Pharmacologic VTE prophylaxis orders are present.    Disposition Planning:   Barriers to Discharge: Renal failure  Anticipated Date of Discharge: 12/16  Place of Discharge: home      Time: 35 minutes     Code  Status and Medical Interventions: CPR (Attempt to Resuscitate); Full Support   Ordered at: 12/10/24 1607     Code Status (Patient has no pulse and is not breathing):    CPR (Attempt to Resuscitate)     Medical Interventions (Patient has pulse or is breathing):    Full Support       Signature: Electronically signed by Hernesto Snyder MD, 12/11/24, 13:13 EST.  [facility] Hospitalist Team

## 2024-12-11 NOTE — NURSING NOTE
Orders verified. Machine & water checks passed. Patient is awake, not in cardio-respi distress, hard of hearing. Access is intact, patent, cannulated x2. Treatment started without difficulty. Education given.

## 2024-12-11 NOTE — PROGRESS NOTES
"RENAL/KCC:     LOS: 11 days    Patient Care Team:  Kvng Guillen MD as PCP - General (Family Medicine)  Jak Gavin MD as Cardiologist (Cardiology)    Chief Complaint:  TONYA/CKD, Kidney transplant    Subjective     Interval History:   Chart reviewed  S/P HD    Objective     Vital Sign Min/Max for last 24 hours  Temp  Min: 97.7 °F (36.5 °C)  Max: 98.3 °F (36.8 °C)   BP  Min: 98/66  Max: 122/78   Pulse  Min: 90  Max: 108   Resp  Min: 15  Max: 17   SpO2  Min: 91 %  Max: 100 %   Flow (L/min) (Oxygen Therapy)  Min: 2  Max: 2   No data recorded     Flowsheet Rows      Flowsheet Row First Filed Value   Admission Height 167.6 cm (66\") Documented at 11/29/2024 1238   Admission Weight 74.5 kg (164 lb 3.9 oz) Documented at 11/29/2024 1238            I/O this shift:  In: 118 [P.O.:118]  Out: 300 [Urine:300]  I/O last 3 completed shifts:  In: 1320 [P.O.:1320]  Out: -     Physical Exam:  GEN: Awake, NAD  ENT: PERRL, EOMI, MMM  NECK: Supple, no JVD  CHEST: CTAB, no W/R/C  CV: RRR, no M/G/R, +edema  ABD: Soft, NT, +BS  SKIN: Warm and Dry  NEURO: CN's intact      WBC WBC   Date Value Ref Range Status   12/11/2024 7.81 3.40 - 10.80 10*3/mm3 Final   12/10/2024 9.08 3.40 - 10.80 10*3/mm3 Final        HGB Hemoglobin   Date Value Ref Range Status   12/11/2024 7.3 (L) 12.0 - 15.9 g/dL Final   12/10/2024 7.4 (L) 12.0 - 15.9 g/dL Final        HCT Hematocrit   Date Value Ref Range Status   12/11/2024 25.0 (L) 34.0 - 46.6 % Final   12/10/2024 25.5 (L) 34.0 - 46.6 % Final        Platlets No results found for: \"LABPLAT\"   MCV MCV   Date Value Ref Range Status   12/11/2024 91.9 79.0 - 97.0 fL Final   12/10/2024 89.8 79.0 - 97.0 fL Final            Sodium Sodium   Date Value Ref Range Status   12/11/2024 128 (L) 136 - 145 mmol/L Final   12/10/2024 128 (L) 136 - 145 mmol/L Final   12/09/2024 131 (L) 136 - 145 mmol/L Final      Potassium Potassium   Date Value Ref Range Status   12/11/2024 4.4 3.5 - 5.2 mmol/L Final   12/10/2024 4.1 3.5 - 5.2 " "mmol/L Final   12/09/2024 3.9 3.5 - 5.2 mmol/L Final     Comment:     Result checked        Chloride Chloride   Date Value Ref Range Status   12/11/2024 98 98 - 107 mmol/L Final   12/10/2024 96 (L) 98 - 107 mmol/L Final   12/09/2024 98 98 - 107 mmol/L Final      CO2 CO2   Date Value Ref Range Status   12/11/2024 20.3 (L) 22.0 - 29.0 mmol/L Final   12/10/2024 21.9 (L) 22.0 - 29.0 mmol/L Final   12/09/2024 21.4 (L) 22.0 - 29.0 mmol/L Final      BUN BUN   Date Value Ref Range Status   12/11/2024 40 (H) 8 - 23 mg/dL Final   12/10/2024 35 (H) 8 - 23 mg/dL Final   12/09/2024 32 (H) 8 - 23 mg/dL Final      Creatinine Creatinine   Date Value Ref Range Status   12/11/2024 3.57 (H) 0.57 - 1.00 mg/dL Final   12/10/2024 3.15 (H) 0.57 - 1.00 mg/dL Final   12/09/2024 3.06 (H) 0.57 - 1.00 mg/dL Final      Calcium Calcium   Date Value Ref Range Status   12/11/2024 8.9 8.6 - 10.5 mg/dL Final   12/10/2024 9.6 8.6 - 10.5 mg/dL Final   12/09/2024 9.0 8.6 - 10.5 mg/dL Final      PO4 No results found for: \"CAPO4\"   Albumin Albumin   Date Value Ref Range Status   12/11/2024 3.0 (L) 3.5 - 5.2 g/dL Final   12/10/2024 3.1 (L) 3.5 - 5.2 g/dL Final   12/09/2024 2.9 (L) 3.5 - 5.2 g/dL Final      Magnesium No results found for: \"MG\"     Uric Acid No results found for: \"URICACID\"        Results Review:     I reviewed the patient's new clinical results.    apixaban, 5 mg, Oral, BID  atorvastatin, 10 mg, Oral, Daily  cholestyramine light, 1 packet, Oral, Q24H  DULoxetine, 20 mg, Oral, Daily  epoetin rylee/rylee-epbx, 20,000 Units, Subcutaneous, Once per day on Monday Wednesday Friday  famotidine, 20 mg, Oral, Every Other Day  hydrOXYzine, 25 mg, Oral, Nightly  levothyroxine, 50 mcg, Oral, Q AM  lidocaine, 1 Application, Topical, Once per day on Monday Wednesday Friday  metoprolol tartrate, 12.5 mg, Oral, Q12H  midodrine, 15 mg, Oral, TID AC  mirtazapine, 15 mg, Oral, Nightly  nystatin, , Topical, Q12H  predniSONE, 5 mg, Oral, Daily With " Breakfast  QUEtiapine, 25 mg, Oral, Nightly  sildenafil, 10 mg, Oral, TID  sodium chloride, 10 mL, Intravenous, Q12H  sodium chloride, 10 mL, Intravenous, Q12H  sodium chloride, 10 mL, Intravenous, Q12H  tacrolimus, 0.5 mg, Oral, BID             Medication Review: Reviewed    Assessment & Plan     1) Kidney Transplant - On Prograf  2) TONYA - HARRY/ATN, Baseline Cr under 1.  3) DVT  4) Hypotension  5) Anemia of CKD  6) AMS    Plan: ATN now requiring acute dialysis.  Midodrine for BP support.  Transplant US with no hydro and no arterial or venous stenosis.  Prograf level was 3.4 on 11/30 and jumped to 15.2 on 12/3 and then 17.6 on 12/7.  Will decreased to 0.5 mg daily while awaiting next level.  On AC and Vascular following.  Repleting iron.  Epo ordered. ABX per primary.  Pulm following as well.        Naun Falcon MD  [facility]  12/11/24  05:56 EST

## 2024-12-11 NOTE — PLAN OF CARE
Goal Outcome Evaluation:  Plan of Care Reviewed With: patient        Progress: no change  Outcome Evaluation: Rested well during the shift. O2 at 2L in use. Scheduled for HD today. No c/o discomfort. Will continue to monitor.

## 2024-12-11 NOTE — PROGRESS NOTES
Daily Progress Note          Assessment  Hypoxic respiratory insufficiency multifactorial   negative respiratory panel      right IJ DVT, brachial artery aneurysmal dilation/AV fistula  No PE on CAT scan  Pulm edema with bilateral pleural effusion left > right  A-fib with RVR was on Eliquis as an outpatient     Pulmonary hypertension  2D echo 10/4/2024  EF 58 % (Biplane Amato's method).   Severe biatrial enlargement.  moderate mitral regurgitation. moderate tricuspid regurgitation.   Calculated pulmonary systolic pressure of 72 mmHg.   Dilated inferior vena cava consistent with a mean right atrial pressure of 15  mmHg.      ? 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 difficile 6/7/2024  History of ESBL E. coli and 6/5/2024  Hypothyroidism  CAD  History of DVT     Pulmonary hypertension  HTN        Recommendations:     Respiratory status is close to her baseline     oxygen supplement and titration to maintain saturation 90-95%: Currently on 2 L per nasal cannula     Cont Low-dose Revatio 10 mg 3 times daily   midodrine 15 mg 3 times daily started we will continue to monitor blood pressure   No nitrates     Diuresis by nephrology  Immunosuppressive agents Prograf and prednisone 5 mg for renal transplant      Anticoagulation: Eliquis     Empiric antibiotics completed IV Zosyn     Avoid sedatives            Chest x-ray 12/1/2024                    LOS: 11 days     Subjective     Patient reports chronic shortness of breath    Objective     Vital signs for last 24 hours:  Vitals:    12/11/24 0930 12/11/24 1000 12/11/24 1015 12/11/24 1631   BP: 115/51 118/67 97/44 109/68   BP Location: Left arm Left arm Left arm Right arm   Patient Position: Lying Lying Lying Lying   Pulse: 95 93 98 94   Resp: 17 15 16 13   Temp:   97.6 °F (36.4 °C) 98 °F (36.7 °C)   TempSrc:   Oral Oral   SpO2: 90% 90% 93% 97%   Weight:       Height:           Intake/Output  last 3 shifts:  I/O last 3 completed shifts:  In: 958 [P.O.:958]  Out: 300 [Urine:300]  Intake/Output this shift:  I/O this shift:  In: 240 [P.O.:240]  Out: 1400       Radiology  Imaging Results (Last 24 Hours)       ** No results found for the last 24 hours. **            Labs:  Results from last 7 days   Lab Units 12/11/24  0301   WBC 10*3/mm3 7.81   HEMOGLOBIN g/dL 7.3*   HEMATOCRIT % 25.0*   PLATELETS 10*3/mm3 150     Results from last 7 days   Lab Units 12/11/24  0301   SODIUM mmol/L 128*   POTASSIUM mmol/L 4.4   CHLORIDE mmol/L 98   CO2 mmol/L 20.3*   BUN mg/dL 40*   CREATININE mg/dL 3.57*   CALCIUM mg/dL 8.9   GLUCOSE mg/dL 104*           Results from last 7 days   Lab Units 12/11/24  0301 12/10/24  0313 12/09/24  1018   ALBUMIN g/dL 3.0* 3.1* 2.9*             Results from last 7 days   Lab Units 12/06/24  0048   MAGNESIUM mg/dL 1.9     Results from last 7 days   Lab Units 12/09/24  1018 12/08/24  0048 12/07/24  0352   APTT seconds 62.0* 44.5* 46.3*               Meds:   SCHEDULE  apixaban, 5 mg, Oral, BID  atorvastatin, 10 mg, Oral, Daily  cholestyramine light, 1 packet, Oral, Q24H  DULoxetine, 20 mg, Oral, Daily  epoetin rylee/rylee-epbx, 20,000 Units, Subcutaneous, Once per day on Monday Wednesday Friday  famotidine, 20 mg, Oral, Every Other Day  hydrOXYzine, 25 mg, Oral, Nightly  levothyroxine, 50 mcg, Oral, Q AM  lidocaine, 1 Application, Topical, Once per day on Monday Wednesday Friday  metoprolol tartrate, 12.5 mg, Oral, Q12H  midodrine, 15 mg, Oral, TID AC  mirtazapine, 15 mg, Oral, Nightly  nystatin, , Topical, Q12H  predniSONE, 5 mg, Oral, Daily With Breakfast  QUEtiapine, 25 mg, Oral, Nightly  sildenafil, 10 mg, Oral, TID  sodium chloride, 10 mL, Intravenous, Q12H  sodium chloride, 10 mL, Intravenous, Q12H  sodium chloride, 10 mL, Intravenous, Q12H  [START ON 12/12/2024] tacrolimus, 0.5 mg, Oral, Daily      Infusions     PRNs    acetaminophen    senna-docusate sodium **AND** polyethylene glycol  **AND** bisacodyl **AND** bisacodyl    diazePAM    influenza vaccine    LORazepam    metoprolol tartrate    ondansetron    oxyCODONE    prochlorperazine    [COMPLETED] Insert Peripheral IV **AND** sodium chloride    sodium chloride    sodium chloride    sodium chloride    sodium chloride    Physical Exam:  General Appearance:  Alert: Looks chronically ill but not in acute distress  HEENT:  Normocephalic, without obvious abnormality, Conjunctiva/corneas clear,.   Nares normal, no drainage     Neck:  Supple, symmetrical, trachea midline.   Lungs /Chest wall:   Bilateral basal rhonchi, respirations unlabored, symmetrical wall movement.     Heart:  Regular rate and rhythm, S1 S2 normal  Abdomen: Soft, non-tender, no masses, no organomegaly.    Extremities: No edema, no clubbing or cyanosis     ROS  Constitutional: Negative for chills, fever and malaise/fatigue.   HENT: Negative.    Eyes: Negative.    Cardiovascular: Negative.    Respiratory: Positive for improving cough and shortness of breath.    Skin: Negative.    Musculoskeletal: Negative.    Gastrointestinal: Negative.    Genitourinary: Negative.    Neurological: Generalized weakness      I reviewed the recent clinical results  I personally reviewed the latest radiological studies    Part of this note may be an electronic transcription/translation of spoken language to printed text using the Dragon Dictation System.

## 2024-12-11 NOTE — PLAN OF CARE
Goal Outcome Evaluation:  Plan of Care Reviewed With: patient           Outcome Evaluation: Pt resting in bed with c/o pain, PRN meds given. HD done today. Wound care done, MD made aware of severe weeping from pt wounds. Sister at bedside updated.

## 2024-12-11 NOTE — SIGNIFICANT NOTE
12/11/24 0946   OTHER   Discipline occupational therapist   Rehab Time/Intention   Session Not Performed patient unavailable for treatment  (Off floor)   Recommendation   OT - Next Appointment 12/12/24

## 2024-12-11 NOTE — THERAPY TREATMENT NOTE
"Subjective: Pt reluctant due to current status but is agreeable to therapeutic exercises. She reports having a bad day today.     Objective:   Increased BLE edema noted L worse than R. Ostomy bag on the back of RLE to assist with management of drainage.     Precautions - desaturates with activity     Therapeutic Exercise - 15 Reps B LE AAROM lying supine    Vitals: Desaturates  to 86% with talking and 84% with exercises. Rest breaks needed between exercises and PT cues for PLB.      Pain: 8 VAS   Location: BLE  Intervention for pain: Repositioned and Therapeutic Presence, BLE elevation with heels floating    Education: Provided education on the importance of mobility in the acute care setting, Verbal/Tactile Cues, Energy conservation strategies, and HEP    Assessment: Jaja Carcamo presents with poor activity tolerance today with increased LE edema and desat with talking and exercises. Significant weeping noted from BLE. Only able to tolerate supine exercises today. Pt with strength, endurance, cognition and functional mobility impairments which indicate the need for skilled intervention.  Will continue to follow and progress as tolerated.     Plan/Recommendations:   If medically appropriate, Moderate Intensity Therapy recommended post-acute care. This is recommended as therapy feels the patient would require 3-4 days per week and wouldn't tolerate \"3 hour daily\" rehab intensity. SNF would be the preferred choice. If the patient does not agree to SNF, arrange HH or OP depending on home bound status. If patient is medically complex, consider LTACH. Pt requires no DME at discharge.     Pt desires Skilled Rehab placement at discharge. Pt cooperative; agreeable to therapeutic recommendations and plan of care.       Post-Tx Position: Supine with HOB Elevated, Alarms activated, and Call light and personal items within reach  PPE: gloves and gown    Therapy Charges for Today       Code Description Service Date Service " Provider Modifiers Qty    42191987621 HC PT THER PROC EA 15 MIN 12/11/2024 Alicja Veras, PT GP 1           PT Charges       Row Name 12/11/24 1637             Time Calculation    Start Time 1422  -      Stop Time 1432  -      Time Calculation (min) 10 min  -      PT Non-Billable Time (min) 0 min  -      PT Received On 12/11/24  -      PT - Next Appointment 12/12/24  -         Time Calculation- PT    Total Timed Code Minutes- PT 10 minute(s)  -                User Key  (r) = Recorded By, (t) = Taken By, (c) = Cosigned By      Initials Name Provider Type    AH Alicja Veras, PT Physical Therapist

## 2024-12-11 NOTE — PLAN OF CARE
Jaja EMELY Carcamo presents with poor activity tolerance today with increased LE edema and desat with talking and exercises. Significant weeping noted from BLE. Only able to tolerate supine exercises today. Pt with strength, endurance, cognition and functional mobility impairments which indicate the need for skilled intervention.  Will continue to follow and progress as tolerated.        Anticipated Discharge Disposition (PT): skilled nursing facility

## 2024-12-12 LAB
ALBUMIN SERPL-MCNC: 3 G/DL (ref 3.5–5.2)
ANION GAP SERPL CALCULATED.3IONS-SCNC: 9.1 MMOL/L (ref 5–15)
BASOPHILS # BLD AUTO: 0.03 10*3/MM3 (ref 0–0.2)
BASOPHILS NFR BLD AUTO: 0.3 % (ref 0–1.5)
BUN SERPL-MCNC: 28 MG/DL (ref 8–23)
BUN/CREAT SERPL: 10.4 (ref 7–25)
CALCIUM SPEC-SCNC: 8.8 MG/DL (ref 8.6–10.5)
CHLORIDE SERPL-SCNC: 97 MMOL/L (ref 98–107)
CO2 SERPL-SCNC: 23.9 MMOL/L (ref 22–29)
CREAT SERPL-MCNC: 2.68 MG/DL (ref 0.57–1)
DEPRECATED RDW RBC AUTO: 77.9 FL (ref 37–54)
EGFRCR SERPLBLD CKD-EPI 2021: 17.8 ML/MIN/1.73
EOSINOPHIL # BLD AUTO: 0.05 10*3/MM3 (ref 0–0.4)
EOSINOPHIL NFR BLD AUTO: 0.5 % (ref 0.3–6.2)
ERYTHROCYTE [DISTWIDTH] IN BLOOD BY AUTOMATED COUNT: 23.3 % (ref 12.3–15.4)
GLUCOSE SERPL-MCNC: 112 MG/DL (ref 65–99)
HCT VFR BLD AUTO: 27.2 % (ref 34–46.6)
HGB BLD-MCNC: 7.8 G/DL (ref 12–15.9)
IMM GRANULOCYTES # BLD AUTO: 0.09 10*3/MM3 (ref 0–0.05)
IMM GRANULOCYTES NFR BLD AUTO: 0.9 % (ref 0–0.5)
LYMPHOCYTES # BLD AUTO: 1.56 10*3/MM3 (ref 0.7–3.1)
LYMPHOCYTES NFR BLD AUTO: 15.4 % (ref 19.6–45.3)
MCH RBC QN AUTO: 27.1 PG (ref 26.6–33)
MCHC RBC AUTO-ENTMCNC: 28.7 G/DL (ref 31.5–35.7)
MCV RBC AUTO: 94.4 FL (ref 79–97)
MONOCYTES # BLD AUTO: 1.01 10*3/MM3 (ref 0.1–0.9)
MONOCYTES NFR BLD AUTO: 10 % (ref 5–12)
NEUTROPHILS NFR BLD AUTO: 7.37 10*3/MM3 (ref 1.7–7)
NEUTROPHILS NFR BLD AUTO: 72.9 % (ref 42.7–76)
NRBC BLD AUTO-RTO: 0.4 /100 WBC (ref 0–0.2)
PHOSPHATE SERPL-MCNC: 5.2 MG/DL (ref 2.5–4.5)
PLATELET # BLD AUTO: 144 10*3/MM3 (ref 140–450)
PMV BLD AUTO: 9.6 FL (ref 6–12)
POTASSIUM SERPL-SCNC: 4.2 MMOL/L (ref 3.5–5.2)
RBC # BLD AUTO: 2.88 10*6/MM3 (ref 3.77–5.28)
SODIUM SERPL-SCNC: 130 MMOL/L (ref 136–145)
WBC NRBC COR # BLD AUTO: 10.11 10*3/MM3 (ref 3.4–10.8)

## 2024-12-12 PROCEDURE — 63710000001 TACROLIMUS PER 1 MG: Performed by: INTERNAL MEDICINE

## 2024-12-12 PROCEDURE — 80069 RENAL FUNCTION PANEL: CPT | Performed by: STUDENT IN AN ORGANIZED HEALTH CARE EDUCATION/TRAINING PROGRAM

## 2024-12-12 PROCEDURE — 25010000002 FUROSEMIDE PER 20 MG: Performed by: INTERNAL MEDICINE

## 2024-12-12 PROCEDURE — 63710000001 PREDNISONE PER 5 MG: Performed by: STUDENT IN AN ORGANIZED HEALTH CARE EDUCATION/TRAINING PROGRAM

## 2024-12-12 PROCEDURE — 85025 COMPLETE CBC W/AUTO DIFF WBC: CPT | Performed by: INTERNAL MEDICINE

## 2024-12-12 RX ORDER — FUROSEMIDE 10 MG/ML
40 INJECTION INTRAMUSCULAR; INTRAVENOUS ONCE
Status: COMPLETED | OUTPATIENT
Start: 2024-12-12 | End: 2024-12-12

## 2024-12-12 RX ADMIN — Medication 10 ML: at 08:30

## 2024-12-12 RX ADMIN — SILDENAFIL 10 MG: 20 TABLET ORAL at 08:28

## 2024-12-12 RX ADMIN — Medication 12.5 MG: at 20:21

## 2024-12-12 RX ADMIN — QUETIAPINE FUMARATE 25 MG: 25 TABLET ORAL at 20:21

## 2024-12-12 RX ADMIN — Medication 12.5 MG: at 08:27

## 2024-12-12 RX ADMIN — TACROLIMUS 0.5 MG: 0.5 CAPSULE ORAL at 08:27

## 2024-12-12 RX ADMIN — LEVOTHYROXINE SODIUM 50 MCG: 50 TABLET ORAL at 06:05

## 2024-12-12 RX ADMIN — PREDNISONE 5 MG: 5 TABLET ORAL at 08:28

## 2024-12-12 RX ADMIN — FAMOTIDINE 20 MG: 20 TABLET, FILM COATED ORAL at 08:27

## 2024-12-12 RX ADMIN — MIDODRINE HYDROCHLORIDE 15 MG: 5 TABLET ORAL at 11:39

## 2024-12-12 RX ADMIN — SILDENAFIL 10 MG: 20 TABLET ORAL at 16:39

## 2024-12-12 RX ADMIN — DULOXETINE HYDROCHLORIDE 20 MG: 20 CAPSULE, DELAYED RELEASE ORAL at 08:28

## 2024-12-12 RX ADMIN — Medication 10 ML: at 20:24

## 2024-12-12 RX ADMIN — NYSTATIN: 100000 POWDER TOPICAL at 08:31

## 2024-12-12 RX ADMIN — Medication 10 ML: at 20:22

## 2024-12-12 RX ADMIN — FUROSEMIDE 40 MG: 10 INJECTION, SOLUTION INTRAMUSCULAR; INTRAVENOUS at 13:34

## 2024-12-12 RX ADMIN — APIXABAN 5 MG: 5 TABLET, FILM COATED ORAL at 20:22

## 2024-12-12 RX ADMIN — MIRTAZAPINE 15 MG: 15 TABLET, FILM COATED ORAL at 20:22

## 2024-12-12 RX ADMIN — ATORVASTATIN CALCIUM 10 MG: 10 TABLET ORAL at 08:28

## 2024-12-12 RX ADMIN — OXYCODONE 10 MG: 5 TABLET ORAL at 20:29

## 2024-12-12 RX ADMIN — HYDROXYZINE HYDROCHLORIDE 25 MG: 25 TABLET ORAL at 20:21

## 2024-12-12 RX ADMIN — Medication 10 ML: at 08:28

## 2024-12-12 RX ADMIN — MIDODRINE HYDROCHLORIDE 15 MG: 5 TABLET ORAL at 16:39

## 2024-12-12 RX ADMIN — OXYCODONE 10 MG: 5 TABLET ORAL at 08:27

## 2024-12-12 RX ADMIN — OXYCODONE 10 MG: 5 TABLET ORAL at 13:13

## 2024-12-12 RX ADMIN — MIDODRINE HYDROCHLORIDE 15 MG: 5 TABLET ORAL at 08:27

## 2024-12-12 RX ADMIN — NYSTATIN: 100000 POWDER TOPICAL at 20:24

## 2024-12-12 RX ADMIN — SILDENAFIL 10 MG: 20 TABLET ORAL at 20:21

## 2024-12-12 RX ADMIN — APIXABAN 5 MG: 5 TABLET, FILM COATED ORAL at 08:27

## 2024-12-12 NOTE — PLAN OF CARE
Goal Outcome Evaluation:      C/o bilateral leg pain.  PRN roxicodone given.  BP 99/49.

## 2024-12-12 NOTE — PLAN OF CARE
Goal Outcome Evaluation:  Plan of Care Reviewed With: patient        Progress: no change  Outcome Evaluation: Pt resting in bed with c/o pain, PRN meds given. HD planned for tomorrow. Legs wrapped, right leg weeping copious amounts, colostomy bag placed to protect skin and monitor output. MD and FAISAL made aware.

## 2024-12-12 NOTE — PROGRESS NOTES
Nutrition Services  Patient Name: Jaja Carcamo  YOB: 1945  MRN: [id number]  Admission date: 11/29/2024    PROGRESS NOTE      Nutrition Intervention Updates: Continue Low Potassium diet as tolerated        Encounter Information: Progress note to check on PO Intakes. Pt with average 75% intake at recent meals - meeting needs with present diet. K+ is well-controlled with 2g K+ restriction in place. Has slightly elevated phosphorus level, but would not apply phosphorus restriction today - elevation only mild. Will continue to monitor.       PO Diet: Diet: Regular/House, Renal; Low Potassium; Fluid Consistency: Thin (IDDSI 0)   PO Supplements: None ordered   PO Intake:  Averaging 75% intake at meals        Current nutrition support:    Nutrition support review:        Labs (reviewed below): Very mildly elevated phosphorus -- will continue current diet for now, with only potassium restriction; could add phosphorus restriction going forward, if indicated        GI Function:  BM 12/9 - has PRN bowel meds available        Brief weight review   Wt Readings from Last 10 Encounters:   12/09/24 0644 85.7 kg (188 lb 15 oz)   12/03/24 0927 81.6 kg (180 lb)   12/01/24 0330 81.8 kg (180 lb 5.4 oz)   11/29/24 1715 81 kg (178 lb 9.2 oz)   11/29/24 1238 74.5 kg (164 lb 3.9 oz)   06/13/24 0452 74.4 kg (164 lb 0.4 oz)   06/11/24 0600 67.5 kg (148 lb 13 oz)   06/08/24 0400 62.5 kg (137 lb 12.6 oz)   06/06/24 0447 64.4 kg (141 lb 15.6 oz)   06/05/24 1152 71.3 kg (157 lb 3 oz)   06/03/24 0501 71.3 kg (157 lb 3 oz)   06/01/24 0644 72.2 kg (159 lb 2.8 oz)   05/29/24 0445 84.7 kg (186 lb 11.7 oz)   05/28/24 0444 84.2 kg (185 lb 10 oz)   05/27/24 0526 84.9 kg (187 lb 2.7 oz)   05/26/24 0406 84.6 kg (186 lb 8.2 oz)   05/25/24 1100 84.6 kg (186 lb 8.2 oz)   05/25/24 0438 85.1 kg (187 lb 9.8 oz)   05/24/24 1143 86.2 kg (190 lb)   05/22/24 0314 89.3 kg (196 lb 13.9 oz)   05/21/24 0430 88.6 kg (195 lb 5.2 oz)   05/20/24 1503 92.1 kg  (203 lb)   05/20/24 0413 92.3 kg (203 lb 7.8 oz)   05/19/24 0500 89.9 kg (198 lb 3.1 oz)   05/18/24 1100 88.9 kg (195 lb 15.8 oz)   05/18/24 0308 92.9 kg (204 lb 12.9 oz)   05/17/24 2150 80.7 kg (178 lb)   08/16/22 0535 84.7 kg (186 lb 11.7 oz)   08/14/22 0433 81.3 kg (179 lb 3.7 oz)   08/12/22 0500 81.4 kg (179 lb 7.3 oz)   08/11/22 2154 81.4 kg (179 lb 7.3 oz)   08/11/22 1301 81.6 kg (180 lb)   03/01/22 0804 83 kg (182 lb 15.7 oz)   03/01/22 0527 83 kg (182 lb 15.7 oz)   02/28/22 2241 81 kg (178 lb 9.2 oz)   10/14/20 1055 (P) 85.3 kg (188 lb)        Results from last 7 days   Lab Units 12/12/24  0347 12/11/24  0301 12/10/24  0313   SODIUM mmol/L 130* 128* 128*   POTASSIUM mmol/L 4.2 4.4 4.1   CHLORIDE mmol/L 97* 98 96*   CO2 mmol/L 23.9 20.3* 21.9*   BUN mg/dL 28* 40* 35*   CREATININE mg/dL 2.68* 3.57* 3.15*   CALCIUM mg/dL 8.8 8.9 9.6   GLUCOSE mg/dL 112* 104* 106*     Results from last 7 days   Lab Units 12/12/24  1046 12/12/24  0347 12/07/24  0352 12/06/24  0048   MAGNESIUM mg/dL  --   --   --  1.9   PHOSPHORUS mg/dL  --  5.2*   < > 5.5*   HEMOGLOBIN g/dL 7.8*  --    < > 7.9*   HEMATOCRIT % 27.2*  --    < > 27.9*    < > = values in this interval not displayed.     RD to follow up per protocol.    Electronically signed by:  Vivi Delatorre RD  12/12/24 15:39 EST

## 2024-12-12 NOTE — PROGRESS NOTES
Daily Progress Note          Assessment  Hypoxic respiratory insufficiency multifactorial   negative respiratory panel      right IJ DVT, brachial artery aneurysmal dilation/AV fistula  No PE on CAT scan  Pulm edema with bilateral pleural effusion left > right  A-fib with RVR was on Eliquis as an outpatient     Pulmonary hypertension  2D echo 10/4/2024  EF 58 % (Biplane Amato's method).   Severe biatrial enlargement.  moderate mitral regurgitation. moderate tricuspid regurgitation.   Calculated pulmonary systolic pressure of 72 mmHg.   Dilated inferior vena cava consistent with a mean right atrial pressure of 15  mmHg.      ? 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 difficile 6/7/2024  History of ESBL E. coli and 6/5/2024  Hypothyroidism  CAD  History of DVT     Pulmonary hypertension  HTN        Recommendations:     Respiratory status is close to her baseline     oxygen supplement and titration to maintain saturation 90-95%: Currently on 2 L per nasal cannula     Cont Low-dose Revatio 10 mg 3 times daily   midodrine 15 mg 3 times daily started we will continue to monitor blood pressure   No nitrates     Diuresis by nephrology  Immunosuppressive agents Prograf and prednisone 5 mg for renal transplant      Anticoagulation: Eliquis     Empiric antibiotics completed IV Zosyn     Avoid sedatives            Chest x-ray 12/1/2024                    LOS: 12 days     Subjective     Patient reports chronic shortness of breath    Objective     Vital signs for last 24 hours:  Vitals:    12/12/24 0413 12/12/24 0700 12/12/24 0736 12/12/24 1109   BP: 99/49  94/60 115/59   BP Location: Left arm  Left arm Left arm   Patient Position: Lying  Lying Lying   Pulse: 90  99 89   Resp: 17  11 9   Temp: 97.4 °F (36.3 °C)  97.8 °F (36.6 °C) 97.6 °F (36.4 °C)   TempSrc: Oral  Oral Oral   SpO2: 90% 91% 91% 91%   Weight:       Height:           Intake/Output last  3 shifts:  I/O last 3 completed shifts:  In: 598 [P.O.:598]  Out: 2000 [Urine:600]  Intake/Output this shift:  I/O this shift:  In: 222 [P.O.:222]  Out: -       Radiology  Imaging Results (Last 24 Hours)       ** No results found for the last 24 hours. **            Labs:  Results from last 7 days   Lab Units 12/12/24  1046   WBC 10*3/mm3 10.11   HEMOGLOBIN g/dL 7.8*   HEMATOCRIT % 27.2*   PLATELETS 10*3/mm3 144     Results from last 7 days   Lab Units 12/12/24  0347   SODIUM mmol/L 130*   POTASSIUM mmol/L 4.2   CHLORIDE mmol/L 97*   CO2 mmol/L 23.9   BUN mg/dL 28*   CREATININE mg/dL 2.68*   CALCIUM mg/dL 8.8   GLUCOSE mg/dL 112*           Results from last 7 days   Lab Units 12/12/24  0347 12/11/24  0301 12/10/24  0313   ALBUMIN g/dL 3.0* 3.0* 3.1*             Results from last 7 days   Lab Units 12/06/24  0048   MAGNESIUM mg/dL 1.9     Results from last 7 days   Lab Units 12/09/24  1018 12/08/24  0048 12/07/24  0352   APTT seconds 62.0* 44.5* 46.3*               Meds:   SCHEDULE  apixaban, 5 mg, Oral, BID  atorvastatin, 10 mg, Oral, Daily  cholestyramine light, 1 packet, Oral, Q24H  DULoxetine, 20 mg, Oral, Daily  epoetin rylee/rylee-epbx, 20,000 Units, Subcutaneous, Once per day on Monday Wednesday Friday  famotidine, 20 mg, Oral, Every Other Day  furosemide, 40 mg, Intravenous, Once  hydrOXYzine, 25 mg, Oral, Nightly  levothyroxine, 50 mcg, Oral, Q AM  lidocaine, 1 Application, Topical, Once per day on Monday Wednesday Friday  metoprolol tartrate, 12.5 mg, Oral, Q12H  midodrine, 15 mg, Oral, TID AC  mirtazapine, 15 mg, Oral, Nightly  nystatin, , Topical, Q12H  predniSONE, 5 mg, Oral, Daily With Breakfast  QUEtiapine, 25 mg, Oral, Nightly  sildenafil, 10 mg, Oral, TID  sodium chloride, 10 mL, Intravenous, Q12H  sodium chloride, 10 mL, Intravenous, Q12H  sodium chloride, 10 mL, Intravenous, Q12H  tacrolimus, 0.5 mg, Oral, Daily      Infusions     PRNs    acetaminophen    senna-docusate sodium **AND** polyethylene  glycol **AND** bisacodyl **AND** bisacodyl    diazePAM    influenza vaccine    LORazepam    metoprolol tartrate    ondansetron    oxyCODONE    prochlorperazine    [COMPLETED] Insert Peripheral IV **AND** sodium chloride    sodium chloride    sodium chloride    sodium chloride    sodium chloride    Physical Exam:  General Appearance:  Alert: Looks chronically ill but not in acute distress  HEENT:  Normocephalic, without obvious abnormality, Conjunctiva/corneas clear,.   Nares normal, no drainage     Neck:  Supple, symmetrical, trachea midline.   Lungs /Chest wall:   Mild bilateral basal rhonchi, respirations unlabored, symmetrical wall movement.     Heart:  Regular rate and rhythm, S1 S2 normal  Abdomen: Soft, non-tender, no masses, no organomegaly.    Extremities: No edema, no clubbing or cyanosis     ROS  Constitutional: Negative for chills, fever and malaise/fatigue.   HENT: Negative.    Eyes: Negative.    Cardiovascular: Negative.    Respiratory: Positive for mild cough and shortness of breath.    Skin: Negative.    Musculoskeletal: Negative.    Gastrointestinal: Negative.    Genitourinary: Negative.    Neurological: Generalized weakness      I reviewed the recent clinical results  I personally reviewed the latest radiological studies    Part of this note may be an electronic transcription/translation of spoken language to printed text using the Dragon Dictation System.

## 2024-12-12 NOTE — PROGRESS NOTES
[facility] MEDICINE SERVICE  DAILY PROGRESS NOTE    NAME: Jaja Carcamo  : 1945  MRN: [id number]      LOS: 12 days     PROVIDER OF SERVICE: Hernesto Snyder MD    Chief Complaint: Acute deep vein thrombosis (DVT) of other vein of right upper extremity    Subjective:     Interval History:      Patient is much more alert and pleasant today.  She still complains of lower extremity pain and swelling.    Review of Systems  10 point ROS note except for above  Objective:     Vital Signs  Temp:  [97.4 °F (36.3 °C)-98 °F (36.7 °C)] 97.6 °F (36.4 °C)  Heart Rate:  [] 91  Resp:  [9-17] 9  BP: ()/(49-68) 104/55  Flow (L/min) (Oxygen Therapy):  [2-3] 3   Body mass index is 30.49 kg/m².    Physical Exam  General Appearance:  Alert, cooperative, no distress, appears stated age  Head:   Normocephalic, without obvious abnormality, atraumatic  Eyes:   PERRL, conjunctiva/corneas clear, EOM's intact, fundi benign, both eyes  Ears:    Normal TM's and external ear canals, both ears  Nose:Nares normal, septum midline, mucosa normal, no drainage or sinus tenderness  Throat:Lips, mucosa, and tongue normal; teeth and gums normal  Neck:Supple, symmetrical, trachea midline, no adenopathy, thyroid: not enlarged, symmetric, no tenderness/mass/nodules, no carotid bruit or JVD  Lungs:             Clear to auscultation bilaterally, respirations unlabored  Heart:  Regular rate and rhythm, S1, S2 normal, no murmur, rub or gallop  Abdomen:  Soft, non-tender, bowel sounds active all four quadrants,  no masses, no organomegaly  Extremities:RUE nonpitting edema with nonfunctioning AV fistula, enlargement of right upper arm veins  Pulses:2+ and symmetric  Skin:Skin color, texture, turgor normal, no rashes or lesions  Neurologic: Normal     Diagnostic Data    Results from last 7 days   Lab Units 24  1046 24  0347   WBC 10*3/mm3 10.11  --    HEMOGLOBIN g/dL 7.8*  --    HEMATOCRIT % 27.2*  --    PLATELETS 10*3/mm3 144   --    GLUCOSE mg/dL  --  112*   CREATININE mg/dL  --  2.68*   BUN mg/dL  --  28*   SODIUM mmol/L  --  130*   POTASSIUM mmol/L  --  4.2   ANION GAP mmol/L  --  9.1       No radiology results for the last day      I reviewed the patient's new clinical results.    Assessment/Plan:     Active and Resolved Problems  Active Hospital Problems    Diagnosis  POA    **Acute deep vein thrombosis (DVT) of other vein of right upper extremity [I82.621]  Yes    Acute DVT (deep venous thrombosis) [I82.409]  Yes      Resolved Hospital Problems   No resolved problems to display.      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 as well as high-grade venous stenosis  -Patient was already on Eliquis  -Vascular surgery consulted; patient underwent balloon angioplasty of right cephalic vein and subclavian vein    TONYA  -Likely contrast-induced nephropathy with ATN  -Renal function worse despite IV fluid resuscitation and patient now appears to be volume overloaded  -Initiated on acute HD via her AV fistula; she may require prolonged HD on discharge if she does not have adequate return of renal function  -Continue ultrafiltration as well for fluid removal  -Patient has severe peripheral edema of the lower extremities with weeping     Atrial flutter with RVR  -Restarted on her home diltiazem dose with adequate rate control  -Patient's anticoagulation was held initially and was started on heparin drip prior to procedure  -Restarted home Eliquis  -Use IV beta-blocker as necessary for heart rate control     COPD  -Stable  -Continue inhalers     History of renal transplant  -Continue Prograf per nephrology recommendations; adjust the dose based on Prograf levels    Anemia of chronic disease  -Hemoglobin fluctuating between 7 and 8  -Transfuse as needed for hemoglobin less than 7 or symptomatic anemia  -Continue EPO injections     Hypothyroidism  -Continue  Synthroid    VTE Prophylaxis:  Pharmacologic VTE prophylaxis orders are present.    Disposition Planning:   Barriers to Discharge: Renal failure  Anticipated Date of Discharge: 12/16  Place of Discharge: home      Time: 35 minutes     Code Status and Medical Interventions: CPR (Attempt to Resuscitate); Full Support   Ordered at: 12/10/24 1607     Code Status (Patient has no pulse and is not breathing):    CPR (Attempt to Resuscitate)     Medical Interventions (Patient has pulse or is breathing):    Full Support       Signature: Electronically signed by Hernesto Snyder MD, 12/12/24, 14:59 EST.  [facility] Hospitalist Team

## 2024-12-12 NOTE — PROGRESS NOTES
"RENAL/KCC:     LOS: 12 days    Patient Care Team:  Kvng Guillen MD as PCP - General (Family Medicine)  Jak Gavin MD as Cardiologist (Cardiology)    Chief Complaint:  TONYA/CKD, Kidney transplant    Subjective     Interval History:   Chart reviewed  S/P HD yesterday  300 cc UOP charted    Objective     Vital Sign Min/Max for last 24 hours  Temp  Min: 97.4 °F (36.3 °C)  Max: 98 °F (36.7 °C)   BP  Min: 94/60  Max: 122/63   Pulse  Min: 90  Max: 107   Resp  Min: 10  Max: 18   SpO2  Min: 90 %  Max: 97 %   Flow (L/min) (Oxygen Therapy)  Min: 2  Max: 2   No data recorded     Flowsheet Rows      Flowsheet Row First Filed Value   Admission Height 167.6 cm (66\") Documented at 11/29/2024 1238   Admission Weight 74.5 kg (164 lb 3.9 oz) Documented at 11/29/2024 1238            No intake/output data recorded.  I/O last 3 completed shifts:  In: 598 [P.O.:598]  Out: 2000 [Urine:600]    Physical Exam:  GEN: Awake, NAD  ENT: PERRL, EOMI, MMM  NECK: Supple, no JVD  CHEST: CTAB, no W/R/C  CV: RRR, no M/G/R, +edema  ABD: Soft, NT, +BS  SKIN: Warm and Dry  NEURO: CN's intact      WBC WBC   Date Value Ref Range Status   12/11/2024 7.81 3.40 - 10.80 10*3/mm3 Final   12/10/2024 9.08 3.40 - 10.80 10*3/mm3 Final        HGB Hemoglobin   Date Value Ref Range Status   12/11/2024 7.3 (L) 12.0 - 15.9 g/dL Final   12/10/2024 7.4 (L) 12.0 - 15.9 g/dL Final        HCT Hematocrit   Date Value Ref Range Status   12/11/2024 25.0 (L) 34.0 - 46.6 % Final   12/10/2024 25.5 (L) 34.0 - 46.6 % Final        Platlets No results found for: \"LABPLAT\"   MCV MCV   Date Value Ref Range Status   12/11/2024 91.9 79.0 - 97.0 fL Final   12/10/2024 89.8 79.0 - 97.0 fL Final            Sodium Sodium   Date Value Ref Range Status   12/12/2024 130 (L) 136 - 145 mmol/L Final   12/11/2024 128 (L) 136 - 145 mmol/L Final   12/10/2024 128 (L) 136 - 145 mmol/L Final   12/09/2024 131 (L) 136 - 145 mmol/L Final      Potassium Potassium   Date Value Ref Range Status " "  12/12/2024 4.2 3.5 - 5.2 mmol/L Final   12/11/2024 4.4 3.5 - 5.2 mmol/L Final   12/10/2024 4.1 3.5 - 5.2 mmol/L Final   12/09/2024 3.9 3.5 - 5.2 mmol/L Final     Comment:     Result checked        Chloride Chloride   Date Value Ref Range Status   12/12/2024 97 (L) 98 - 107 mmol/L Final   12/11/2024 98 98 - 107 mmol/L Final   12/10/2024 96 (L) 98 - 107 mmol/L Final   12/09/2024 98 98 - 107 mmol/L Final      CO2 CO2   Date Value Ref Range Status   12/12/2024 23.9 22.0 - 29.0 mmol/L Final   12/11/2024 20.3 (L) 22.0 - 29.0 mmol/L Final   12/10/2024 21.9 (L) 22.0 - 29.0 mmol/L Final   12/09/2024 21.4 (L) 22.0 - 29.0 mmol/L Final      BUN BUN   Date Value Ref Range Status   12/12/2024 28 (H) 8 - 23 mg/dL Final   12/11/2024 40 (H) 8 - 23 mg/dL Final   12/10/2024 35 (H) 8 - 23 mg/dL Final   12/09/2024 32 (H) 8 - 23 mg/dL Final      Creatinine Creatinine   Date Value Ref Range Status   12/12/2024 2.68 (H) 0.57 - 1.00 mg/dL Final   12/11/2024 3.57 (H) 0.57 - 1.00 mg/dL Final   12/10/2024 3.15 (H) 0.57 - 1.00 mg/dL Final   12/09/2024 3.06 (H) 0.57 - 1.00 mg/dL Final      Calcium Calcium   Date Value Ref Range Status   12/12/2024 8.8 8.6 - 10.5 mg/dL Final   12/11/2024 8.9 8.6 - 10.5 mg/dL Final   12/10/2024 9.6 8.6 - 10.5 mg/dL Final   12/09/2024 9.0 8.6 - 10.5 mg/dL Final      PO4 No results found for: \"CAPO4\"   Albumin Albumin   Date Value Ref Range Status   12/12/2024 3.0 (L) 3.5 - 5.2 g/dL Final   12/11/2024 3.0 (L) 3.5 - 5.2 g/dL Final   12/10/2024 3.1 (L) 3.5 - 5.2 g/dL Final   12/09/2024 2.9 (L) 3.5 - 5.2 g/dL Final      Magnesium No results found for: \"MG\"     Uric Acid No results found for: \"URICACID\"        Results Review:     I reviewed the patient's new clinical results.    apixaban, 5 mg, Oral, BID  atorvastatin, 10 mg, Oral, Daily  cholestyramine light, 1 packet, Oral, Q24H  DULoxetine, 20 mg, Oral, Daily  epoetin rylee/rylee-epbx, 20,000 Units, Subcutaneous, Once per day on Monday Wednesday Friday  famotidine, " 20 mg, Oral, Every Other Day  hydrOXYzine, 25 mg, Oral, Nightly  levothyroxine, 50 mcg, Oral, Q AM  lidocaine, 1 Application, Topical, Once per day on Monday Wednesday Friday  metoprolol tartrate, 12.5 mg, Oral, Q12H  midodrine, 15 mg, Oral, TID AC  mirtazapine, 15 mg, Oral, Nightly  nystatin, , Topical, Q12H  predniSONE, 5 mg, Oral, Daily With Breakfast  QUEtiapine, 25 mg, Oral, Nightly  sildenafil, 10 mg, Oral, TID  sodium chloride, 10 mL, Intravenous, Q12H  sodium chloride, 10 mL, Intravenous, Q12H  sodium chloride, 10 mL, Intravenous, Q12H  tacrolimus, 0.5 mg, Oral, Daily             Medication Review: Reviewed    Assessment & Plan     1) Kidney Transplant - On Prograf  2) TONYA - HARRY/ATN, Baseline Cr under 1.  3) DVT  4) Hypotension  5) Anemia of CKD  6) AMS    Plan: ATN now requiring acute dialysis.  Midodrine for BP support.  Transplant US with no hydro and no arterial or venous stenosis.  Prograf level was 3.4 on 11/30 and jumped to 15.2 on 12/3 and then 17.6 on 12/7. Decreased Prograf to 0.5 mg daily while awaiting next level.  On AC and Vascular following.  Repleting iron.  Epo ordered. ABX per primary.  Pulm following as well.        Naun Falcon MD  [facility]  12/12/24  08:42 EST

## 2024-12-13 ENCOUNTER — APPOINTMENT (OUTPATIENT)
Dept: NEPHROLOGY | Facility: HOSPITAL | Age: 79
End: 2024-12-13
Payer: MEDICARE

## 2024-12-13 LAB
ALBUMIN SERPL-MCNC: 3 G/DL (ref 3.5–5.2)
ANION GAP SERPL CALCULATED.3IONS-SCNC: 11.9 MMOL/L (ref 5–15)
BASOPHILS # BLD AUTO: 0.03 10*3/MM3 (ref 0–0.2)
BASOPHILS NFR BLD AUTO: 0.3 % (ref 0–1.5)
BUN SERPL-MCNC: 33 MG/DL (ref 8–23)
BUN/CREAT SERPL: 10.8 (ref 7–25)
CALCIUM SPEC-SCNC: 8.9 MG/DL (ref 8.6–10.5)
CHLORIDE SERPL-SCNC: 97 MMOL/L (ref 98–107)
CO2 SERPL-SCNC: 22.1 MMOL/L (ref 22–29)
CREAT SERPL-MCNC: 3.06 MG/DL (ref 0.57–1)
DEPRECATED RDW RBC AUTO: 80.8 FL (ref 37–54)
EGFRCR SERPLBLD CKD-EPI 2021: 15.2 ML/MIN/1.73
EOSINOPHIL # BLD AUTO: 0.02 10*3/MM3 (ref 0–0.4)
EOSINOPHIL NFR BLD AUTO: 0.2 % (ref 0.3–6.2)
ERYTHROCYTE [DISTWIDTH] IN BLOOD BY AUTOMATED COUNT: 23.7 % (ref 12.3–15.4)
GLUCOSE SERPL-MCNC: 92 MG/DL (ref 65–99)
HCT VFR BLD AUTO: 26.4 % (ref 34–46.6)
HGB BLD-MCNC: 7.5 G/DL (ref 12–15.9)
IMM GRANULOCYTES # BLD AUTO: 0.06 10*3/MM3 (ref 0–0.05)
IMM GRANULOCYTES NFR BLD AUTO: 0.6 % (ref 0–0.5)
LYMPHOCYTES # BLD AUTO: 2.06 10*3/MM3 (ref 0.7–3.1)
LYMPHOCYTES NFR BLD AUTO: 19.1 % (ref 19.6–45.3)
MCH RBC QN AUTO: 26.6 PG (ref 26.6–33)
MCHC RBC AUTO-ENTMCNC: 28.4 G/DL (ref 31.5–35.7)
MCV RBC AUTO: 93.6 FL (ref 79–97)
MONOCYTES # BLD AUTO: 1.19 10*3/MM3 (ref 0.1–0.9)
MONOCYTES NFR BLD AUTO: 11 % (ref 5–12)
NEUTROPHILS NFR BLD AUTO: 68.8 % (ref 42.7–76)
NEUTROPHILS NFR BLD AUTO: 7.45 10*3/MM3 (ref 1.7–7)
NRBC BLD AUTO-RTO: 0.5 /100 WBC (ref 0–0.2)
PHOSPHATE SERPL-MCNC: 6.3 MG/DL (ref 2.5–4.5)
PLATELET # BLD AUTO: 158 10*3/MM3 (ref 140–450)
PMV BLD AUTO: 10.1 FL (ref 6–12)
POTASSIUM SERPL-SCNC: 4.5 MMOL/L (ref 3.5–5.2)
RBC # BLD AUTO: 2.82 10*6/MM3 (ref 3.77–5.28)
SODIUM SERPL-SCNC: 131 MMOL/L (ref 136–145)
WBC NRBC COR # BLD AUTO: 10.81 10*3/MM3 (ref 3.4–10.8)

## 2024-12-13 PROCEDURE — 25010000002 EPOETIN ALFA-EPBX 20000 UNIT/ML SOLUTION: Performed by: INTERNAL MEDICINE

## 2024-12-13 PROCEDURE — 97530 THERAPEUTIC ACTIVITIES: CPT

## 2024-12-13 PROCEDURE — 80069 RENAL FUNCTION PANEL: CPT | Performed by: STUDENT IN AN ORGANIZED HEALTH CARE EDUCATION/TRAINING PROGRAM

## 2024-12-13 PROCEDURE — 63710000001 PREDNISONE PER 5 MG: Performed by: STUDENT IN AN ORGANIZED HEALTH CARE EDUCATION/TRAINING PROGRAM

## 2024-12-13 PROCEDURE — 97535 SELF CARE MNGMENT TRAINING: CPT

## 2024-12-13 PROCEDURE — 63710000001 TACROLIMUS PER 1 MG: Performed by: INTERNAL MEDICINE

## 2024-12-13 PROCEDURE — 85025 COMPLETE CBC W/AUTO DIFF WBC: CPT | Performed by: INTERNAL MEDICINE

## 2024-12-13 PROCEDURE — 97110 THERAPEUTIC EXERCISES: CPT

## 2024-12-13 RX ADMIN — TACROLIMUS 0.5 MG: 0.5 CAPSULE ORAL at 11:42

## 2024-12-13 RX ADMIN — APIXABAN 5 MG: 5 TABLET, FILM COATED ORAL at 20:05

## 2024-12-13 RX ADMIN — Medication 10 ML: at 20:06

## 2024-12-13 RX ADMIN — DULOXETINE HYDROCHLORIDE 20 MG: 20 CAPSULE, DELAYED RELEASE ORAL at 11:42

## 2024-12-13 RX ADMIN — SILDENAFIL 10 MG: 20 TABLET ORAL at 14:38

## 2024-12-13 RX ADMIN — QUETIAPINE FUMARATE 25 MG: 25 TABLET ORAL at 20:05

## 2024-12-13 RX ADMIN — Medication 10 ML: at 11:43

## 2024-12-13 RX ADMIN — EPOETIN ALFA-EPBX 20000 UNITS: 20000 INJECTION, SOLUTION INTRAVENOUS; SUBCUTANEOUS at 14:38

## 2024-12-13 RX ADMIN — MIDODRINE HYDROCHLORIDE 15 MG: 5 TABLET ORAL at 11:42

## 2024-12-13 RX ADMIN — ATORVASTATIN CALCIUM 10 MG: 10 TABLET ORAL at 11:42

## 2024-12-13 RX ADMIN — APIXABAN 5 MG: 5 TABLET, FILM COATED ORAL at 11:43

## 2024-12-13 RX ADMIN — NYSTATIN: 100000 POWDER TOPICAL at 11:43

## 2024-12-13 RX ADMIN — PREDNISONE 5 MG: 5 TABLET ORAL at 11:42

## 2024-12-13 RX ADMIN — MIRTAZAPINE 15 MG: 15 TABLET, FILM COATED ORAL at 20:05

## 2024-12-13 RX ADMIN — OXYCODONE 10 MG: 5 TABLET ORAL at 00:07

## 2024-12-13 RX ADMIN — OXYCODONE 10 MG: 5 TABLET ORAL at 11:43

## 2024-12-13 RX ADMIN — OXYCODONE 10 MG: 5 TABLET ORAL at 17:14

## 2024-12-13 RX ADMIN — SILDENAFIL 10 MG: 20 TABLET ORAL at 17:02

## 2024-12-13 RX ADMIN — HYDROXYZINE HYDROCHLORIDE 25 MG: 25 TABLET ORAL at 20:05

## 2024-12-13 RX ADMIN — SILDENAFIL 10 MG: 20 TABLET ORAL at 20:05

## 2024-12-13 RX ADMIN — MIDODRINE HYDROCHLORIDE 15 MG: 5 TABLET ORAL at 17:02

## 2024-12-13 RX ADMIN — Medication 12.5 MG: at 14:38

## 2024-12-13 RX ADMIN — LEVOTHYROXINE SODIUM 50 MCG: 50 TABLET ORAL at 05:56

## 2024-12-13 RX ADMIN — NYSTATIN: 100000 POWDER TOPICAL at 20:07

## 2024-12-13 RX ADMIN — Medication 10 ML: at 20:05

## 2024-12-13 NOTE — CASE MANAGEMENT/SOCIAL WORK
Continued Stay Note  DAHIANA Gay     Patient Name: Jaja Carcamo  MRN: [id number]  Today's Date: 12/13/2024    Admit Date: 11/29/2024    Plan: DC PLAN: [facility], okay to return. No PASRR or Precert needed. New HD M/W/F [facility]. May need transport.       Discharge Plan       Row Name 12/13/24 1604       Plan    Plan DC PLAN: [facility], okay to return. No PASRR or Precert needed. New HD M/W/F [facility]. May need transport.        Patient/Family in Agreement with Plan yes    Plan Comments Per renal, Case management to set up HD M/W/F with [facility], reached out to [facility] uncertain if they can set up. Reached out to [facility] to set up. Awaiting Renal to sign off, anticipate discharge in 1-2 days.                        Expected Discharge Date and Time       Expected Discharge Date Expected Discharge Time    Dec 13, 2024           Sofie Roman RN   Case Management  [phone]

## 2024-12-13 NOTE — PROGRESS NOTES
Daily Progress Note          Assessment  Hypoxic respiratory insufficiency multifactorial   negative respiratory panel      right IJ DVT, brachial artery aneurysmal dilation/AV fistula  No PE on CAT scan  Pulm edema with bilateral pleural effusion left > right  A-fib with RVR was on Eliquis as an outpatient     Pulmonary hypertension  2D echo 10/4/2024  EF 58 % (Biplane Amato's method).   Severe biatrial enlargement.  moderate mitral regurgitation. moderate tricuspid regurgitation.   Calculated pulmonary systolic pressure of 72 mmHg.   Dilated inferior vena cava consistent with a mean right atrial pressure of 15  mmHg.      ? 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 difficile 6/7/2024  History of ESBL E. coli and 6/5/2024  Hypothyroidism  CAD  History of DVT     Pulmonary hypertension  HTN        Recommendations:     Respiratory status is close to her baseline     oxygen supplement and titration to maintain saturation 90-95%: Currently on 2-3 L per nasal cannula     Cont Low-dose Revatio 10 mg 3 times daily   midodrine 15 mg 3 times daily started we will continue to monitor blood pressure   No nitrates     Diuresis by nephrology  Immunosuppressive agents Prograf and prednisone 5 mg for renal transplant      Anticoagulation: Eliquis     antibiotics completed IV Zosyn     Avoid sedatives            Chest x-ray 12/1/2024                    LOS: 13 days     Subjective     Patient reports chronic shortness of breath    Objective     Vital signs for last 24 hours:  Vitals:    12/13/24 0900 12/13/24 0930 12/13/24 1000 12/13/24 1125   BP: 96/47 91/46 95/45 (!) 81/58   BP Location: Left arm Left arm Left arm Left arm   Patient Position: Lying Lying Lying Lying   Pulse: 117 113 103 109   Resp: 14 19 16 17   Temp:       TempSrc:    Oral   SpO2: 94% 94% 95% 100%   Weight:       Height:           Intake/Output last 3 shifts:  I/O last 3 completed  shifts:  In: 942 [P.O.:942]  Out: 300 [Urine:300]  Intake/Output this shift:  No intake/output data recorded.      Radiology  Imaging Results (Last 24 Hours)       ** No results found for the last 24 hours. **            Labs:  Results from last 7 days   Lab Units 12/13/24  0515   WBC 10*3/mm3 10.81*   HEMOGLOBIN g/dL 7.5*   HEMATOCRIT % 26.4*   PLATELETS 10*3/mm3 158     Results from last 7 days   Lab Units 12/13/24  0515   SODIUM mmol/L 131*   POTASSIUM mmol/L 4.5   CHLORIDE mmol/L 97*   CO2 mmol/L 22.1   BUN mg/dL 33*   CREATININE mg/dL 3.06*   CALCIUM mg/dL 8.9   GLUCOSE mg/dL 92           Results from last 7 days   Lab Units 12/13/24  0515 12/12/24  0347 12/11/24  0301   ALBUMIN g/dL 3.0* 3.0* 3.0*                   Results from last 7 days   Lab Units 12/09/24  1018 12/08/24  0048 12/07/24  0352   APTT seconds 62.0* 44.5* 46.3*               Meds:   SCHEDULE  apixaban, 5 mg, Oral, BID  atorvastatin, 10 mg, Oral, Daily  cholestyramine light, 1 packet, Oral, Q24H  DULoxetine, 20 mg, Oral, Daily  epoetin rylee/rylee-epbx, 20,000 Units, Subcutaneous, Once per day on Monday Wednesday Friday  famotidine, 20 mg, Oral, Every Other Day  hydrOXYzine, 25 mg, Oral, Nightly  levothyroxine, 50 mcg, Oral, Q AM  lidocaine, 1 Application, Topical, Once per day on Monday Wednesday Friday  metoprolol tartrate, 12.5 mg, Oral, Q12H  midodrine, 15 mg, Oral, TID AC  mirtazapine, 15 mg, Oral, Nightly  nystatin, , Topical, Q12H  predniSONE, 5 mg, Oral, Daily With Breakfast  QUEtiapine, 25 mg, Oral, Nightly  sildenafil, 10 mg, Oral, TID  sodium chloride, 10 mL, Intravenous, Q12H  sodium chloride, 10 mL, Intravenous, Q12H  sodium chloride, 10 mL, Intravenous, Q12H  tacrolimus, 0.5 mg, Oral, Daily      Infusions     PRNs    acetaminophen    senna-docusate sodium **AND** polyethylene glycol **AND** bisacodyl **AND** bisacodyl    diazePAM    influenza vaccine    LORazepam    metoprolol tartrate    ondansetron    oxyCODONE     prochlorperazine    [COMPLETED] Insert Peripheral IV **AND** sodium chloride    sodium chloride    sodium chloride    sodium chloride    sodium chloride    Physical Exam:  General Appearance:  Alert: Looks chronically ill but not in acute distress  HEENT:  Normocephalic, without obvious abnormality, Conjunctiva/corneas clear,.   Nares normal, no drainage     Neck:  Supple, symmetrical, trachea midline.   Lungs /Chest wall:   Mild bilateral basal rhonchi, respirations unlabored, symmetrical wall movement.     Heart:  Regular rate and rhythm, S1 S2 normal  Abdomen: Soft, non-tender, no masses, no organomegaly.    Extremities: No edema, no clubbing or cyanosis     ROS  Constitutional: Negative for chills, fever and malaise/fatigue.   HENT: Negative.    Eyes: Negative.    Cardiovascular: Negative.    Respiratory: Positive for mild cough and shortness of breath.    Skin: Negative.    Musculoskeletal: Negative.    Gastrointestinal: Negative.    Genitourinary: Negative.    Neurological: Generalized weakness      I reviewed the recent clinical results  I personally reviewed the latest radiological studies    Part of this note may be an electronic transcription/translation of spoken language to printed text using the Dragon Dictation System.

## 2024-12-13 NOTE — PLAN OF CARE
Goal Outcome Evaluation:  Plan of Care Reviewed With: patient        Progress: no change  Outcome Evaluation: Pt resting in bed with c/o pain, PRN meds given. HD done today. Legs draining.

## 2024-12-13 NOTE — CASE MANAGEMENT/SOCIAL WORK
Case Management/Social Work    Patient Name:  Jaja Carcamo  YOB: 1945  MRN: [id number]  Admit Date:  11/29/2024        New OP HD started in [facility]. Chair time pending. Anticipated start date of 12/16/24. Do not discharge patient without chair time confirmation.       Electronically signed by:  Allan Boyd RN  12/13/24 16:29 EST

## 2024-12-13 NOTE — THERAPY TREATMENT NOTE
"Subjective: Pt agreeable to therapeutic plan of care.  Cognition: arousal/alertness: Alert and Attentive    Objective:     Bed Mobility: Max-A and Assist x 2   Functional Transfers: N/A or Not attempted.     Balance: sitting EOB Supervision  Functional Ambulation: N/A or Not attempted.    Grooming: Supervision  ADL Position: sitting up in bed  ADL Comments: Washing face and hands using wet wipe    Vitals: WNL    Pain: 0 VAS  Location: NA  Interventions for pain: N/A  Education: Provided education on the importance of mobility in the acute care setting      Assessment: Jaja Carcamo presents with ADL impairments affecting function including balance, endurance / activity tolerance, pain, range of motion (ROM), shortness of breath, and strength. Demonstrated functioning below baseline abilities indicate the need for continued skilled intervention while inpatient. Tolerating session today without incident. Will continue to follow and progress as tolerated.     Plan/Recommendations:   Moderate Intensity Therapy recommended post-acute care. This is recommended as therapy feels the patient would require 3-4 days per week and wouldn't tolerate \"3 hour daily\" rehab intensity. SNF would be the preferred choice. If the patient does not agree to SNF, arrange HH or OP depending on home bound status. If patient is medically complex, consider LTACH.. Pt requires no DME at discharge.     Pt desires Skilled Rehab placement at discharge. Pt cooperative; agreeable to therapeutic recommendations and plan of care.     Modified Flores: N/A = No pre-op stroke/TIA    Post-Tx Position: Supine with HOB Elevated, Alarms activated, and Call light and personal items within reach  PPE: gloves    Therapy Charges for Today       Code Description Service Date Service Provider Modifiers Qty    [id number]  OT SELF CARE/MGMT/TRAIN EA 15 MIN 12/13/2024 Ida Herndon OT GO 1    [id number]  OT THERAPEUTIC ACT EA 15 MIN 12/13/2024 " Ida Herndon, OT GO 1           Time Calculation- OT       Row Name 12/13/24 1546             Time Calculation- OT    OT Start Time 1355  -SR      OT Stop Time 1413  -SR      OT Time Calculation (min) 18 min  -SR      Total Timed Code Minutes- OT 18 minute(s)  -SR      OT Received On 12/13/24  -SR      OT - Next Appointment 12/16/24  -SR                User Key  (r) = Recorded By, (t) = Taken By, (c) = Cosigned By      Initials Name Provider Type    SR Ida Herndon, OT Occupational Therapist

## 2024-12-13 NOTE — PROGRESS NOTES
"RENAL/KCC:     LOS: 13 days    Patient Care Team:  Kvng Guillen MD as PCP - General (Family Medicine)  Jak Gavin MD as Cardiologist (Cardiology)    Chief Complaint:  TONYA/CKD, Kidney transplant    Subjective     Interval History:   Chart reviewed  S/P HD    Objective     Vital Sign Min/Max for last 24 hours  Temp  Min: 97.5 °F (36.4 °C)  Max: 98.4 °F (36.9 °C)   BP  Min: 94/60  Max: 115/59   Pulse  Min: 89  Max: 107   Resp  Min: 9  Max: 15   SpO2  Min: 90 %  Max: 97 %   Flow (L/min) (Oxygen Therapy)  Min: 2  Max: 3   No data recorded     Flowsheet Rows      Flowsheet Row First Filed Value   Admission Height 167.6 cm (66\") Documented at 11/29/2024 1238   Admission Weight 74.5 kg (164 lb 3.9 oz) Documented at 11/29/2024 1238            No intake/output data recorded.  I/O last 3 completed shifts:  In: 942 [P.O.:942]  Out: 300 [Urine:300]    Physical Exam:  GEN: Awake, NAD  ENT: PERRL, EOMI, MMM  NECK: Supple, no JVD  CHEST: CTAB, no W/R/C  CV: RRR, no M/G/R, +edema  ABD: Soft, NT, +BS  SKIN: Warm and Dry  NEURO: CN's intact      WBC WBC   Date Value Ref Range Status   12/13/2024 10.81 (H) 3.40 - 10.80 10*3/mm3 Final   12/12/2024 10.11 3.40 - 10.80 10*3/mm3 Final   12/11/2024 7.81 3.40 - 10.80 10*3/mm3 Final        HGB Hemoglobin   Date Value Ref Range Status   12/13/2024 7.5 (L) 12.0 - 15.9 g/dL Final   12/12/2024 7.8 (L) 12.0 - 15.9 g/dL Final   12/11/2024 7.3 (L) 12.0 - 15.9 g/dL Final        HCT Hematocrit   Date Value Ref Range Status   12/13/2024 26.4 (L) 34.0 - 46.6 % Final   12/12/2024 27.2 (L) 34.0 - 46.6 % Final   12/11/2024 25.0 (L) 34.0 - 46.6 % Final        Platlets No results found for: \"LABPLAT\"   MCV MCV   Date Value Ref Range Status   12/13/2024 93.6 79.0 - 97.0 fL Final   12/12/2024 94.4 79.0 - 97.0 fL Final   12/11/2024 91.9 79.0 - 97.0 fL Final            Sodium Sodium   Date Value Ref Range Status   12/13/2024 131 (L) 136 - 145 mmol/L Final   12/12/2024 130 (L) 136 - 145 mmol/L Final " "  12/11/2024 128 (L) 136 - 145 mmol/L Final      Potassium Potassium   Date Value Ref Range Status   12/13/2024 4.5 3.5 - 5.2 mmol/L Final     Comment:     Specimen hemolyzed.  Result may be falsely elevated.   12/12/2024 4.2 3.5 - 5.2 mmol/L Final   12/11/2024 4.4 3.5 - 5.2 mmol/L Final      Chloride Chloride   Date Value Ref Range Status   12/13/2024 97 (L) 98 - 107 mmol/L Final   12/12/2024 97 (L) 98 - 107 mmol/L Final   12/11/2024 98 98 - 107 mmol/L Final      CO2 CO2   Date Value Ref Range Status   12/13/2024 22.1 22.0 - 29.0 mmol/L Final   12/12/2024 23.9 22.0 - 29.0 mmol/L Final   12/11/2024 20.3 (L) 22.0 - 29.0 mmol/L Final      BUN BUN   Date Value Ref Range Status   12/13/2024 33 (H) 8 - 23 mg/dL Final   12/12/2024 28 (H) 8 - 23 mg/dL Final   12/11/2024 40 (H) 8 - 23 mg/dL Final      Creatinine Creatinine   Date Value Ref Range Status   12/13/2024 3.06 (H) 0.57 - 1.00 mg/dL Final   12/12/2024 2.68 (H) 0.57 - 1.00 mg/dL Final   12/11/2024 3.57 (H) 0.57 - 1.00 mg/dL Final      Calcium Calcium   Date Value Ref Range Status   12/13/2024 8.9 8.6 - 10.5 mg/dL Final   12/12/2024 8.8 8.6 - 10.5 mg/dL Final   12/11/2024 8.9 8.6 - 10.5 mg/dL Final      PO4 No results found for: \"CAPO4\"   Albumin Albumin   Date Value Ref Range Status   12/13/2024 3.0 (L) 3.5 - 5.2 g/dL Final   12/12/2024 3.0 (L) 3.5 - 5.2 g/dL Final   12/11/2024 3.0 (L) 3.5 - 5.2 g/dL Final      Magnesium No results found for: \"MG\"     Uric Acid No results found for: \"URICACID\"        Results Review:     I reviewed the patient's new clinical results.    apixaban, 5 mg, Oral, BID  atorvastatin, 10 mg, Oral, Daily  cholestyramine light, 1 packet, Oral, Q24H  DULoxetine, 20 mg, Oral, Daily  epoetin rylee/rylee-epbx, 20,000 Units, Subcutaneous, Once per day on Monday Wednesday Friday  famotidine, 20 mg, Oral, Every Other Day  hydrOXYzine, 25 mg, Oral, Nightly  levothyroxine, 50 mcg, Oral, Q AM  lidocaine, 1 Application, Topical, Once per day on Monday " Wednesday Friday  metoprolol tartrate, 12.5 mg, Oral, Q12H  midodrine, 15 mg, Oral, TID AC  mirtazapine, 15 mg, Oral, Nightly  nystatin, , Topical, Q12H  predniSONE, 5 mg, Oral, Daily With Breakfast  QUEtiapine, 25 mg, Oral, Nightly  sildenafil, 10 mg, Oral, TID  sodium chloride, 10 mL, Intravenous, Q12H  sodium chloride, 10 mL, Intravenous, Q12H  sodium chloride, 10 mL, Intravenous, Q12H  tacrolimus, 0.5 mg, Oral, Daily             Medication Review: Reviewed    Assessment & Plan     1) Kidney Transplant - On Prograf  2) TONYA - HARRY/ATN, Baseline Cr under 1.  3) DVT  4) Hypotension  5) Anemia of CKD  6) AMS    Plan: ATN now requiring acute dialysis MWF.  Midodrine for BP support.  Transplant US with no hydro and no arterial or venous stenosis.  Prograf level was 3.4 on 11/30 and jumped to 15.2 on 12/3 and then 17.6 on 12/7. Decreased Prograf to 0.5 mg daily while awaiting next level.  On AC and Vascular following.  Repleting iron.  Epo ordered. ABX per primary.  Pulm following as well.        Naun Falcon MD  [facility]  12/13/24  07:26 EST

## 2024-12-13 NOTE — PLAN OF CARE
"Goal Outcome Evaluation:                  Plan/Recommendations:   Moderate Intensity Therapy recommended post-acute care. This is recommended as therapy feels the patient would require 3-4 days per week and wouldn't tolerate \"3 hour daily\" rehab intensity. SNF would be the preferred choice. If the patient does not agree to SNF, arrange HH or OP depending on home bound status. If patient is medically complex, consider LTACH.. Pt requires no DME at discharge.                       "

## 2024-12-13 NOTE — PLAN OF CARE
Problem: Adult Inpatient Plan of Care  Goal: Plan of Care Review  Outcome: Progressing  Flowsheets (Taken 12/13/2024 0401)  Progress: improving  Plan of Care Reviewed With: patient  Goal: Patient-Specific Goal (Individualized)  Outcome: Progressing  Goal: Absence of Hospital-Acquired Illness or Injury  Outcome: Progressing  Intervention: Identify and Manage Fall Risk  Recent Flowsheet Documentation  Taken 12/13/2024 0200 by Karmen Sears LPN  Safety Promotion/Fall Prevention: safety round/check completed  Taken 12/12/2024 2200 by Karmen Sears LPN  Safety Promotion/Fall Prevention:   safety round/check completed   nonskid shoes/slippers when out of bed   room organization consistent  Taken 12/12/2024 2000 by Karmen Sears LPN  Safety Promotion/Fall Prevention:   safety round/check completed   room organization consistent   nonskid shoes/slippers when out of bed  Intervention: Prevent Skin Injury  Recent Flowsheet Documentation  Taken 12/13/2024 0000 by Karmen Sears LPN  Body Position: turned  Skin Protection: drying agents applied  Taken 12/12/2024 2000 by Karmen Sears LPN  Body Position: weight shifting  Skin Protection: drying agents applied  Intervention: Prevent Infection  Recent Flowsheet Documentation  Taken 12/13/2024 0200 by Karmen Sears LPN  Infection Prevention: single patient room provided  Taken 12/13/2024 0000 by Karmen Sears LPN  Infection Prevention: single patient room provided  Taken 12/12/2024 2200 by Karmen Sears LPN  Infection Prevention:   single patient room provided   rest/sleep promoted   hand hygiene promoted  Taken 12/12/2024 2000 by Karmen Sears LPN  Infection Prevention:   single patient room provided   rest/sleep promoted   hand hygiene promoted  Goal: Optimal Comfort and Wellbeing  Outcome: Progressing  Goal: Readiness for Transition of Care  Outcome: Progressing     Problem: Comorbidity Management  Goal: Maintenance of COPD Symptom Control  Outcome:  Progressing  Intervention: Maintain COPD (Chronic Obstructive Pulmonary Disease) Symptom Control  Recent Flowsheet Documentation  Taken 12/12/2024 2000 by Karmen Sears LPN  Medication Review/Management: medications reviewed  Goal: Blood Pressure in Desired Range  Outcome: Progressing  Intervention: Maintain Blood Pressure Management  Recent Flowsheet Documentation  Taken 12/12/2024 2000 by Karmen Sears LPN  Medication Review/Management: medications reviewed     Problem: Skin Injury Risk Increased  Goal: Skin Health and Integrity  Outcome: Progressing  Intervention: Optimize Skin Protection  Recent Flowsheet Documentation  Taken 12/13/2024 0000 by Karmen Sears LPN  Pressure Reduction Techniques: frequent weight shift encouraged  Head of Bed (HOB) Positioning: HOB elevated  Pressure Reduction Devices:   pressure-redistributing mattress utilized   alternating pressure pump (LENI)   positioning supports utilized  Skin Protection: drying agents applied  Taken 12/12/2024 2000 by Karmen Sears LPN  Activity Management: activity encouraged  Pressure Reduction Techniques: frequent weight shift encouraged  Head of Bed (HOB) Positioning: HOB elevated  Pressure Reduction Devices:   pressure-redistributing mattress utilized   alternating pressure pump (LENI)   heel offloading device utilized   positioning supports utilized  Skin Protection: drying agents applied     Problem: Fall Injury Risk  Goal: Absence of Fall and Fall-Related Injury  Outcome: Progressing  Intervention: Identify and Manage Contributors  Recent Flowsheet Documentation  Taken 12/12/2024 2000 by Karmen Sears LPN  Medication Review/Management: medications reviewed  Intervention: Promote Injury-Free Environment  Recent Flowsheet Documentation  Taken 12/13/2024 0200 by Karmen Sears LPN  Safety Promotion/Fall Prevention: safety round/check completed  Taken 12/12/2024 2200 by Karmen Sears LPN  Safety Promotion/Fall Prevention:   safety round/check  completed   nonskid shoes/slippers when out of bed   room organization consistent  Taken 12/12/2024 2000 by Karmen Sears LPN  Safety Promotion/Fall Prevention:   safety round/check completed   room organization consistent   nonskid shoes/slippers when out of bed     Problem: Breathing Pattern Ineffective  Goal: Effective Breathing Pattern  Outcome: Progressing  Intervention: Promote Improved Breathing Pattern  Recent Flowsheet Documentation  Taken 12/13/2024 0000 by Karmen Sears LPN  Head of Bed (HOB) Positioning: HOB elevated  Taken 12/12/2024 2000 by Karmen Sears LPN  Head of Bed (HOB) Positioning: HOB elevated     Problem: Sepsis/Septic Shock  Goal: Optimal Coping  Outcome: Progressing  Goal: Absence of Bleeding  Outcome: Progressing  Goal: Blood Glucose Level Within Target Range  Outcome: Progressing  Goal: Absence of Infection Signs and Symptoms  Outcome: Progressing  Intervention: Initiate Sepsis Management  Recent Flowsheet Documentation  Taken 12/13/2024 0200 by Karmen Sears LPN  Infection Prevention: single patient room provided  Isolation Precautions:   precautions maintained   contact  Taken 12/13/2024 0000 by Karmen Sears LPN  Infection Prevention: single patient room provided  Isolation Precautions:   precautions maintained   contact  Taken 12/12/2024 2200 by Karmen Sears LPN  Infection Prevention:   single patient room provided   rest/sleep promoted   hand hygiene promoted  Isolation Precautions:   precautions maintained   contact  Taken 12/12/2024 2000 by Karmen Sears LPN  Infection Prevention:   single patient room provided   rest/sleep promoted   hand hygiene promoted  Isolation Precautions:   precautions maintained   contact  Intervention: Promote Recovery  Recent Flowsheet Documentation  Taken 12/12/2024 2000 by Karmen Sears LPN  Activity Management: activity encouraged  Goal: Optimal Nutrition Delivery  Outcome: Progressing   Goal Outcome Evaluation:  Plan of Care Reviewed  With: patient        Progress: improving          Pt resting in bed with c/o pain in her lower extremities. Pt requested removal of bandages on both legs, due to a burning sensation. Will reapply when patient is ready. Right leg is draining a significant amount of blood tinged fluid, colostomy applied to right leg to monitor output and protect the skin. Vitals have been stable throughout the night. Will continue to monitor..

## 2024-12-13 NOTE — PLAN OF CARE
Jaja Carcamo presents with endurance, strength, and functional mobility impairments which indicate the need for skilled intervention. She feels poorly after HD this AM, refuses transfer to chair. Agreeable to sitting EOB, and maintains static sitting balance with SBA and good posture. Tolerating session today without incident. PT drained 250 ml from RLE leg bag, RN notified.  Will continue to follow and progress as tolerated.     Anticipated Discharge Disposition (PT): skilled nursing facility

## 2024-12-13 NOTE — THERAPY TREATMENT NOTE
"Subjective: Pt agreeable to therapeutic plan of care. Reports feeling poorly after HD this AM but is agreeable to sitting up at EOB.     Objective:     Precautions - poor skin integrity. Ostomy bag on back of RLE due to draining wound.     Bed mobility - Max-A and Assist x 2 with drawsheet and bedrail use. Transfer pad placed under her.    Transfers - N/A or Not attempted. Pt too fatigued to get up to chair today.      Therapeutic Exercise - 10 Reps B LE AROM unsupported sitting / EOB. Limited ROM.     Vitals: WNL    Pain: 7 VAS   Location: BLE and R hip  Intervention for pain: Repositioned, RN notified, Increased Activity, and Therapeutic Presence    Education: Provided education on the importance of mobility in the acute care setting, Verbal/Tactile Cues, Transfer Training, and HEP    Assessment: Jaja Carcamo presents with endurance, strength, and functional mobility impairments which indicate the need for skilled intervention. She feels poorly after HD this AM, refuses transfer to chair. Agreeable to sitting EOB, and maintains static sitting balance with SBA and good posture. Tolerating session today without incident. PT drained 250 ml from RLE leg bag, RN notified.  Will continue to follow and progress as tolerated.     Plan/Recommendations:   If medically appropriate, Moderate Intensity Therapy recommended post-acute care. This is recommended as therapy feels the patient would require 3-4 days per week and wouldn't tolerate \"3 hour daily\" rehab intensity. SNF would be the preferred choice. If the patient does not agree to SNF, arrange HH or OP depending on home bound status. If patient is medically complex, consider LTACH. Pt requires no DME at discharge.     Pt desires Skilled Rehab placement at discharge. Pt cooperative; agreeable to therapeutic recommendations and plan of care.       Post-Tx Position: Supine with HOB Elevated, Alarms activated, and Call light and personal items within reach  PPE: gloves " and zack

## 2024-12-13 NOTE — PROGRESS NOTES
[facility] MEDICINE SERVICE  DAILY PROGRESS NOTE    NAME: Jaja Carcamo  : 1945  MRN: [id number]      LOS: 13 days     PROVIDER OF SERVICE: Hernesto Snyder MD    Chief Complaint: Acute deep vein thrombosis (DVT) of other vein of right upper extremity    Subjective:     Interval History:      Patient feeling well today.  Still complaining of lower extremity swelling although this seems to be improving.    Review of Systems  10 point ROS note except for above  Objective:     Vital Signs  Temp:  [97.5 °F (36.4 °C)-98.4 °F (36.9 °C)] 97.7 °F (36.5 °C)  Heart Rate:  [] 109  Resp:  [11-19] 17  BP: ()/(43-61) 81/58  Flow (L/min) (Oxygen Therapy):  [3] 3   Body mass index is 30.49 kg/m².    Physical Exam  General Appearance:  Alert, cooperative, no distress, appears stated age  Head:   Normocephalic, without obvious abnormality, atraumatic  Eyes:   PERRL, conjunctiva/corneas clear, EOM's intact, fundi benign, both eyes  Ears:    Normal TM's and external ear canals, both ears  Nose:Nares normal, septum midline, mucosa normal, no drainage or sinus tenderness  Throat:Lips, mucosa, and tongue normal; teeth and gums normal  Neck:Supple, symmetrical, trachea midline, no adenopathy, thyroid: not enlarged, symmetric, no tenderness/mass/nodules, no carotid bruit or JVD  Lungs:             Clear to auscultation bilaterally, respirations unlabored  Heart:  Regular rate and rhythm, S1, S2 normal, no murmur, rub or gallop  Abdomen:  Soft, non-tender, bowel sounds active all four quadrants,  no masses, no organomegaly  Extremities:RUE nonpitting edema with nonfunctioning AV fistula, enlargement of right upper arm veins  Pulses:2+ and symmetric  Skin:Skin color, texture, turgor normal, no rashes or lesions  Neurologic: Normal     Diagnostic Data    Results from last 7 days   Lab Units 24  0515   WBC 10*3/mm3 10.81*   HEMOGLOBIN g/dL 7.5*   HEMATOCRIT % 26.4*   PLATELETS 10*3/mm3 158   GLUCOSE mg/dL 92    CREATININE mg/dL 3.06*   BUN mg/dL 33*   SODIUM mmol/L 131*   POTASSIUM mmol/L 4.5   ANION GAP mmol/L 11.9       No radiology results for the last day      I reviewed the patient's new clinical results.    Assessment/Plan:     Active and Resolved Problems  Active Hospital Problems    Diagnosis  POA    **Acute deep vein thrombosis (DVT) of other vein of right upper extremity [I82.621]  Yes    Acute DVT (deep venous thrombosis) [I82.409]  Yes      Resolved Hospital Problems   No resolved problems to display.      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 as well as high-grade venous stenosis  -Patient was already on Eliquis  -Vascular surgery consulted; patient underwent balloon angioplasty of right cephalic vein and subclavian vein    TONYA  -Likely contrast-induced nephropathy with ATN  -Renal function worse despite IV fluid resuscitation and patient now appears to be volume overloaded  -Initiated on acute HD via her AV fistula; she may require prolonged HD on discharge if she does not have adequate return of renal function  -Continue ultrafiltration as well for fluid removal  -Patient has severe peripheral edema of the lower extremities with weeping  -Still with oliguria although urine measurements have not been accurate     Atrial flutter with RVR  -Restarted on her home diltiazem dose with adequate rate control  -Patient's anticoagulation was held initially and was started on heparin drip prior to procedure  -Restarted home Eliquis  -Use IV beta-blocker as necessary for heart rate control     COPD  -Stable  -Continue inhalers     History of renal transplant  -Continue Prograf per nephrology recommendations; adjust the dose based on Prograf levels    Anemia of chronic disease  -Hemoglobin fluctuating between 7 and 8  -Transfuse as needed for hemoglobin less than 7 or symptomatic anemia  -Continue EPO injections      Hypothyroidism  -Continue Synthroid    VTE Prophylaxis:  Pharmacologic VTE prophylaxis orders are present.    Disposition Planning:   Barriers to Discharge: Renal failure  Anticipated Date of Discharge: 12/18  Place of Discharge: home      Time: 35 minutes     Code Status and Medical Interventions: CPR (Attempt to Resuscitate); Full Support   Ordered at: 12/10/24 1607     Code Status (Patient has no pulse and is not breathing):    CPR (Attempt to Resuscitate)     Medical Interventions (Patient has pulse or is breathing):    Full Support       Signature: Electronically signed by Hernesto Snyder MD, 12/13/24, 13:14 EST.  [facility] Hospitalist Team

## 2024-12-14 LAB
ABO GROUP BLD: NORMAL
ALBUMIN SERPL-MCNC: 2.8 G/DL (ref 3.5–5.2)
ANION GAP SERPL CALCULATED.3IONS-SCNC: 7.7 MMOL/L (ref 5–15)
BASOPHILS # BLD AUTO: 0.02 10*3/MM3 (ref 0–0.2)
BASOPHILS NFR BLD AUTO: 0.2 % (ref 0–1.5)
BLD GP AB SCN SERPL QL: NEGATIVE
BUN SERPL-MCNC: 25 MG/DL (ref 8–23)
BUN/CREAT SERPL: 10.7 (ref 7–25)
CALCIUM SPEC-SCNC: 8.4 MG/DL (ref 8.6–10.5)
CHLORIDE SERPL-SCNC: 99 MMOL/L (ref 98–107)
CO2 SERPL-SCNC: 25.3 MMOL/L (ref 22–29)
CREAT SERPL-MCNC: 2.34 MG/DL (ref 0.57–1)
DEPRECATED RDW RBC AUTO: 80.9 FL (ref 37–54)
EGFRCR SERPLBLD CKD-EPI 2021: 21 ML/MIN/1.73
EOSINOPHIL # BLD AUTO: 0.01 10*3/MM3 (ref 0–0.4)
EOSINOPHIL NFR BLD AUTO: 0.1 % (ref 0.3–6.2)
ERYTHROCYTE [DISTWIDTH] IN BLOOD BY AUTOMATED COUNT: 24 % (ref 12.3–15.4)
GLUCOSE SERPL-MCNC: 97 MG/DL (ref 65–99)
HCT VFR BLD AUTO: 22.6 % (ref 34–46.6)
HCT VFR BLD AUTO: 28.3 % (ref 34–46.6)
HGB BLD-MCNC: 6.6 G/DL (ref 12–15.9)
HGB BLD-MCNC: 8.4 G/DL (ref 12–15.9)
IMM GRANULOCYTES # BLD AUTO: 0.08 10*3/MM3 (ref 0–0.05)
IMM GRANULOCYTES NFR BLD AUTO: 0.8 % (ref 0–0.5)
LYMPHOCYTES # BLD AUTO: 1.46 10*3/MM3 (ref 0.7–3.1)
LYMPHOCYTES NFR BLD AUTO: 14.6 % (ref 19.6–45.3)
MCH RBC QN AUTO: 27.8 PG (ref 26.6–33)
MCHC RBC AUTO-ENTMCNC: 29.2 G/DL (ref 31.5–35.7)
MCV RBC AUTO: 95.4 FL (ref 79–97)
MONOCYTES # BLD AUTO: 1.01 10*3/MM3 (ref 0.1–0.9)
MONOCYTES NFR BLD AUTO: 10.1 % (ref 5–12)
NEUTROPHILS NFR BLD AUTO: 7.44 10*3/MM3 (ref 1.7–7)
NEUTROPHILS NFR BLD AUTO: 74.2 % (ref 42.7–76)
NRBC BLD AUTO-RTO: 0.3 /100 WBC (ref 0–0.2)
PHOSPHATE SERPL-MCNC: 4.7 MG/DL (ref 2.5–4.5)
PLATELET # BLD AUTO: 128 10*3/MM3 (ref 140–450)
PMV BLD AUTO: 10 FL (ref 6–12)
POTASSIUM SERPL-SCNC: 4.2 MMOL/L (ref 3.5–5.2)
RBC # BLD AUTO: 2.37 10*6/MM3 (ref 3.77–5.28)
RH BLD: POSITIVE
SODIUM SERPL-SCNC: 132 MMOL/L (ref 136–145)
T&S EXPIRATION DATE: NORMAL
TACROLIMUS BLD LC/MS/MS-MCNC: 10.6 NG/ML (ref 2–20)
WBC NRBC COR # BLD AUTO: 10.02 10*3/MM3 (ref 3.4–10.8)

## 2024-12-14 PROCEDURE — 86900 BLOOD TYPING SEROLOGIC ABO: CPT

## 2024-12-14 PROCEDURE — 63710000001 TACROLIMUS PER 1 MG: Performed by: INTERNAL MEDICINE

## 2024-12-14 PROCEDURE — 36430 TRANSFUSION BLD/BLD COMPNT: CPT

## 2024-12-14 PROCEDURE — 85014 HEMATOCRIT: CPT | Performed by: INTERNAL MEDICINE

## 2024-12-14 PROCEDURE — 85025 COMPLETE CBC W/AUTO DIFF WBC: CPT | Performed by: INTERNAL MEDICINE

## 2024-12-14 PROCEDURE — P9016 RBC LEUKOCYTES REDUCED: HCPCS

## 2024-12-14 PROCEDURE — 86923 COMPATIBILITY TEST ELECTRIC: CPT

## 2024-12-14 PROCEDURE — 86901 BLOOD TYPING SEROLOGIC RH(D): CPT | Performed by: NURSE PRACTITIONER

## 2024-12-14 PROCEDURE — 85018 HEMOGLOBIN: CPT | Performed by: INTERNAL MEDICINE

## 2024-12-14 PROCEDURE — 86850 RBC ANTIBODY SCREEN: CPT | Performed by: NURSE PRACTITIONER

## 2024-12-14 PROCEDURE — 86900 BLOOD TYPING SEROLOGIC ABO: CPT | Performed by: NURSE PRACTITIONER

## 2024-12-14 PROCEDURE — 86901 BLOOD TYPING SEROLOGIC RH(D): CPT

## 2024-12-14 PROCEDURE — C1751 CATH, INF, PER/CENT/MIDLINE: HCPCS

## 2024-12-14 PROCEDURE — 63710000001 PREDNISONE PER 5 MG: Performed by: STUDENT IN AN ORGANIZED HEALTH CARE EDUCATION/TRAINING PROGRAM

## 2024-12-14 PROCEDURE — 25010000002 ONDANSETRON PER 1 MG: Performed by: STUDENT IN AN ORGANIZED HEALTH CARE EDUCATION/TRAINING PROGRAM

## 2024-12-14 PROCEDURE — 80197 ASSAY OF TACROLIMUS: CPT | Performed by: INTERNAL MEDICINE

## 2024-12-14 PROCEDURE — 80069 RENAL FUNCTION PANEL: CPT | Performed by: STUDENT IN AN ORGANIZED HEALTH CARE EDUCATION/TRAINING PROGRAM

## 2024-12-14 RX ORDER — OXYCODONE HYDROCHLORIDE 5 MG/1
10 TABLET ORAL EVERY 4 HOURS PRN
Status: DISPENSED | OUTPATIENT
Start: 2024-12-14 | End: 2024-12-17

## 2024-12-14 RX ADMIN — Medication 10 ML: at 20:09

## 2024-12-14 RX ADMIN — APIXABAN 5 MG: 5 TABLET, FILM COATED ORAL at 08:43

## 2024-12-14 RX ADMIN — DULOXETINE HYDROCHLORIDE 20 MG: 20 CAPSULE, DELAYED RELEASE ORAL at 08:45

## 2024-12-14 RX ADMIN — OXYCODONE 10 MG: 5 TABLET ORAL at 16:20

## 2024-12-14 RX ADMIN — ONDANSETRON 4 MG: 2 INJECTION INTRAMUSCULAR; INTRAVENOUS at 06:09

## 2024-12-14 RX ADMIN — CHOLESTYRAMINE 4 G: 4 POWDER, FOR SUSPENSION ORAL at 08:42

## 2024-12-14 RX ADMIN — FAMOTIDINE 20 MG: 20 TABLET, FILM COATED ORAL at 08:43

## 2024-12-14 RX ADMIN — Medication 20 ML: at 20:08

## 2024-12-14 RX ADMIN — OXYCODONE 10 MG: 5 TABLET ORAL at 00:25

## 2024-12-14 RX ADMIN — SILDENAFIL 10 MG: 20 TABLET ORAL at 08:43

## 2024-12-14 RX ADMIN — OXYCODONE 10 MG: 5 TABLET ORAL at 23:37

## 2024-12-14 RX ADMIN — APIXABAN 5 MG: 5 TABLET, FILM COATED ORAL at 20:08

## 2024-12-14 RX ADMIN — ACETAMINOPHEN 650 MG: 325 TABLET, FILM COATED ORAL at 21:39

## 2024-12-14 RX ADMIN — HYDROXYZINE HYDROCHLORIDE 25 MG: 25 TABLET ORAL at 20:08

## 2024-12-14 RX ADMIN — SILDENAFIL 10 MG: 20 TABLET ORAL at 16:20

## 2024-12-14 RX ADMIN — Medication 10 ML: at 08:52

## 2024-12-14 RX ADMIN — Medication 12.5 MG: at 08:44

## 2024-12-14 RX ADMIN — SENNOSIDES AND DOCUSATE SODIUM 2 TABLET: 50; 8.6 TABLET ORAL at 20:08

## 2024-12-14 RX ADMIN — MIRTAZAPINE 15 MG: 15 TABLET, FILM COATED ORAL at 20:08

## 2024-12-14 RX ADMIN — OXYCODONE 10 MG: 5 TABLET ORAL at 12:03

## 2024-12-14 RX ADMIN — MIDODRINE HYDROCHLORIDE 15 MG: 5 TABLET ORAL at 16:22

## 2024-12-14 RX ADMIN — Medication 12.5 MG: at 20:08

## 2024-12-14 RX ADMIN — NYSTATIN: 100000 POWDER TOPICAL at 08:50

## 2024-12-14 RX ADMIN — ATORVASTATIN CALCIUM 10 MG: 10 TABLET ORAL at 08:44

## 2024-12-14 RX ADMIN — MIDODRINE HYDROCHLORIDE 15 MG: 5 TABLET ORAL at 08:44

## 2024-12-14 RX ADMIN — ONDANSETRON 4 MG: 2 INJECTION INTRAMUSCULAR; INTRAVENOUS at 20:08

## 2024-12-14 RX ADMIN — QUETIAPINE FUMARATE 25 MG: 25 TABLET ORAL at 20:08

## 2024-12-14 RX ADMIN — NYSTATIN: 100000 POWDER TOPICAL at 20:09

## 2024-12-14 RX ADMIN — SILDENAFIL 10 MG: 20 TABLET ORAL at 20:08

## 2024-12-14 RX ADMIN — TACROLIMUS 0.5 MG: 0.5 CAPSULE ORAL at 08:45

## 2024-12-14 RX ADMIN — MIDODRINE HYDROCHLORIDE 15 MG: 5 TABLET ORAL at 12:04

## 2024-12-14 RX ADMIN — PREDNISONE 5 MG: 5 TABLET ORAL at 08:43

## 2024-12-14 RX ADMIN — Medication 10 ML: at 08:41

## 2024-12-14 NOTE — PLAN OF CARE
Problem: Adult Inpatient Plan of Care  Goal: Plan of Care Review  Outcome: Progressing  Goal: Patient-Specific Goal (Individualized)  Outcome: Progressing  Goal: Absence of Hospital-Acquired Illness or Injury  Outcome: Progressing  Intervention: Identify and Manage Fall Risk  Recent Flowsheet Documentation  Taken 12/14/2024 1400 by Patricia Richardson LPN  Safety Promotion/Fall Prevention:   safety round/check completed   assistive device/personal items within reach  Taken 12/14/2024 1200 by Patricia Richardson LPN  Safety Promotion/Fall Prevention:   safety round/check completed   assistive device/personal items within reach   fall prevention program maintained  Taken 12/14/2024 1000 by Patricia Richardson LPN  Safety Promotion/Fall Prevention:   safety round/check completed   assistive device/personal items within reach   fall prevention program maintained  Taken 12/14/2024 0800 by Patricia Richardson LPN  Safety Promotion/Fall Prevention:   safety round/check completed   assistive device/personal items within reach   fall prevention program maintained  Intervention: Prevent and Manage VTE (Venous Thromboembolism) Risk  Recent Flowsheet Documentation  Taken 12/14/2024 0800 by Patricia Richardson LPN  VTE Prevention/Management:   SCDs (sequential compression devices) off   patient refused intervention  Intervention: Prevent Infection  Recent Flowsheet Documentation  Taken 12/14/2024 1400 by Patricia Richardson LPN  Infection Prevention:   cohorting utilized   equipment surfaces disinfected   personal protective equipment utilized   hand hygiene promoted   rest/sleep promoted   single patient room provided   visitors restricted/screened  Taken 12/14/2024 1200 by Patricia Richardson LPN  Infection Prevention:   cohorting utilized   equipment surfaces disinfected   hand hygiene promoted   personal protective equipment utilized   rest/sleep promoted   visitors restricted/screened   single patient room provided  Taken 12/14/2024 1000 by Patricia Richardson  LPN  Infection Prevention:   cohorting utilized   equipment surfaces disinfected   hand hygiene promoted   rest/sleep promoted   personal protective equipment utilized   single patient room provided   visitors restricted/screened  Taken 12/14/2024 0800 by Patricia Richardson LPN  Infection Prevention:   cohorting utilized   equipment surfaces disinfected   hand hygiene promoted   personal protective equipment utilized   rest/sleep promoted   visitors restricted/screened   single patient room provided  Goal: Optimal Comfort and Wellbeing  Outcome: Progressing  Intervention: Monitor Pain and Promote Comfort  Recent Flowsheet Documentation  Taken 12/14/2024 1200 by Patricia Richardson LPN  Pain Management Interventions: pain medication given  Intervention: Provide Person-Centered Care  Recent Flowsheet Documentation  Taken 12/14/2024 0800 by Patricia Richardson LPN  Trust Relationship/Rapport:   care explained   thoughts/feelings acknowledged   questions answered  Goal: Readiness for Transition of Care  Outcome: Progressing     Problem: Comorbidity Management  Goal: Maintenance of COPD Symptom Control  Outcome: Progressing  Intervention: Maintain COPD (Chronic Obstructive Pulmonary Disease) Symptom Control  Recent Flowsheet Documentation  Taken 12/14/2024 1400 by Patricia Richardson LPN  Medication Review/Management: medications reviewed  Taken 12/14/2024 1200 by Patricia Richardson LPN  Medication Review/Management: medications reviewed  Taken 12/14/2024 1000 by Patricia Richardson LPN  Medication Review/Management: medications reviewed  Taken 12/14/2024 0800 by Patricia Richardson LPN  Medication Review/Management: medications reviewed  Goal: Blood Pressure in Desired Range  Outcome: Progressing  Intervention: Maintain Blood Pressure Management  Recent Flowsheet Documentation  Taken 12/14/2024 1400 by Patricia Richardson LPN  Medication Review/Management: medications reviewed  Taken 12/14/2024 1200 by Patricia Richardson LPN  Medication Review/Management: medications  reviewed  Taken 12/14/2024 1000 by Patricia Richardson LPN  Medication Review/Management: medications reviewed  Taken 12/14/2024 0800 by Patricia Richardson LPN  Medication Review/Management: medications reviewed     Problem: Skin Injury Risk Increased  Goal: Skin Health and Integrity  Outcome: Progressing     Problem: Fall Injury Risk  Goal: Absence of Fall and Fall-Related Injury  Outcome: Progressing  Intervention: Identify and Manage Contributors  Recent Flowsheet Documentation  Taken 12/14/2024 1400 by Patricia Richardson LPN  Medication Review/Management: medications reviewed  Taken 12/14/2024 1200 by Patricia Richardson LPN  Medication Review/Management: medications reviewed  Taken 12/14/2024 1000 by Patricia Richardson LPN  Medication Review/Management: medications reviewed  Taken 12/14/2024 0800 by Patricia Richarsdon LPN  Medication Review/Management: medications reviewed  Intervention: Promote Injury-Free Environment  Recent Flowsheet Documentation  Taken 12/14/2024 1400 by Patricia Richardson LPN  Safety Promotion/Fall Prevention:   safety round/check completed   assistive device/personal items within reach  Taken 12/14/2024 1200 by Patricia Richardson LPN  Safety Promotion/Fall Prevention:   safety round/check completed   assistive device/personal items within reach   fall prevention program maintained  Taken 12/14/2024 1000 by Patricia Richardson LPN  Safety Promotion/Fall Prevention:   safety round/check completed   assistive device/personal items within reach   fall prevention program maintained  Taken 12/14/2024 0800 by Patricia Richardson LPN  Safety Promotion/Fall Prevention:   safety round/check completed   assistive device/personal items within reach   fall prevention program maintained     Problem: Breathing Pattern Ineffective  Goal: Effective Breathing Pattern  Outcome: Progressing  Intervention: Promote Improved Breathing Pattern  Recent Flowsheet Documentation  Taken 12/14/2024 0800 by Patricia Richardson LPN  Supportive Measures: active listening  utilized     Problem: Sepsis/Septic Shock  Goal: Optimal Coping  Outcome: Progressing  Intervention: Support Patient and Family Response  Recent Flowsheet Documentation  Taken 12/14/2024 0800 by Patricia Richardson LPN  Supportive Measures: active listening utilized  Family/Support System Care: caregiver stress acknowledged  Goal: Absence of Bleeding  Outcome: Progressing  Goal: Blood Glucose Level Within Target Range  Outcome: Progressing  Goal: Absence of Infection Signs and Symptoms  Outcome: Progressing  Intervention: Initiate Sepsis Management  Recent Flowsheet Documentation  Taken 12/14/2024 1400 by Patricia Richardson LPN  Infection Prevention:   cohorting utilized   equipment surfaces disinfected   personal protective equipment utilized   hand hygiene promoted   rest/sleep promoted   single patient room provided   visitors restricted/screened  Isolation Precautions:   precautions maintained   contact  Taken 12/14/2024 1200 by Patricia Richardson LPN  Infection Prevention:   cohorting utilized   equipment surfaces disinfected   hand hygiene promoted   personal protective equipment utilized   rest/sleep promoted   visitors restricted/screened   single patient room provided  Isolation Precautions:   precautions maintained   contact  Taken 12/14/2024 1000 by Patricia Richardson LPN  Infection Prevention:   cohorting utilized   equipment surfaces disinfected   hand hygiene promoted   rest/sleep promoted   personal protective equipment utilized   single patient room provided   visitors restricted/screened  Isolation Precautions:   precautions maintained   contact  Taken 12/14/2024 0800 by Patricia Richardson LPN  Infection Prevention:   cohorting utilized   equipment surfaces disinfected   hand hygiene promoted   personal protective equipment utilized   rest/sleep promoted   visitors restricted/screened   single patient room provided  Isolation Precautions:   precautions maintained   contact  Goal: Optimal Nutrition Delivery  Outcome:  Progressing   Goal Outcome Evaluation:   PICC team put a 20G in pts shoulder, so that we could give blood. Gave blood today, Handled transfusion well.

## 2024-12-14 NOTE — PROGRESS NOTES
[facility] MEDICINE SERVICE  DAILY PROGRESS NOTE    NAME: Jaja Carcamo  : 1945  MRN: [id number]      LOS: 14 days     PROVIDER OF SERVICE: Samir Jackman MD    Chief Complaint: Acute deep vein thrombosis (DVT) of other vein of right upper extremity    Subjective:     Interval History:      No specific complaint, feeling okay today, discussed with the patient the plan that she has not had an outpatient HD chair rate which is the limiting factor for discharge    Review of Systems  10 point ROS note except for above  Objective:     Vital Signs  Temp:  [97.5 °F (36.4 °C)-98.2 °F (36.8 °C)] 97.9 °F (36.6 °C)  Heart Rate:  [] 103  Resp:  [13-18] 16  BP: ()/(41-65) 109/60  Flow (L/min) (Oxygen Therapy):  [3] 3   Body mass index is 30.49 kg/m².    Physical Exam  General Appearance:  Alert, cooperative, no distress, appears stated age  Head:   Normocephalic, without obvious abnormality, atraumatic  Eyes:   PERRL, conjunctiva/corneas clear, EOM's intact, fundi benign, both eyes  Ears:    Normal TM's and external ear canals, both ears  Nose:Nares normal, septum midline, mucosa normal, no drainage or sinus tenderness  Throat:Lips, mucosa, and tongue normal; teeth and gums normal  Neck:Supple, symmetrical, trachea midline, no adenopathy, thyroid: not enlarged, symmetric, no tenderness/mass/nodules, no carotid bruit or JVD  Lungs:             Clear to auscultation bilaterally, respirations unlabored  Heart:  Regular rate and rhythm, S1, S2 normal, no murmur, rub or gallop  Abdomen:  Soft, non-tender, bowel sounds active all four quadrants,  no masses, no organomegaly  Extremities:RUE nonpitting edema with nonfunctioning AV fistula, enlargement of right upper arm veins  Pulses:2+ and symmetric  Skin:Skin color, texture, turgor normal, no rashes or lesions  Neurologic: Normal     Diagnostic Data    Results from last 7 days   Lab Units 24  0252   WBC 10*3/mm3 10.02   HEMOGLOBIN g/dL 6.6*   HEMATOCRIT  % 22.6*   PLATELETS 10*3/mm3 128*   GLUCOSE mg/dL 97   CREATININE mg/dL 2.34*   BUN mg/dL 25*   SODIUM mmol/L 132*   POTASSIUM mmol/L 4.2   ANION GAP mmol/L 7.7       No radiology results for the last day      I reviewed the patient's new clinical results.    Assessment/Plan:     Active and Resolved Problems  Active Hospital Problems    Diagnosis  POA    **Acute deep vein thrombosis (DVT) of other vein of right upper extremity [I82.621]  Yes    Acute DVT (deep venous thrombosis) [I82.409]  Yes      Resolved Hospital Problems   No resolved problems to display.      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 as well as high-grade venous stenosis  -Patient was already on Eliquis  -Vascular surgery consulted; patient underwent balloon angioplasty of right cephalic vein and subclavian vein    TONYA  -Likely contrast-induced nephropathy with ATN  -Renal function worse despite IV fluid resuscitation and patient now appears to be volume overloaded  -Initiated on acute HD via her AV fistula  -Patient is going to require dialysis Monday Wednesday Friday  -Awaiting outpatient HD unit chair availability with anticipate that hopefully by Monday then can discharge the patient at that point     Atrial flutter with RVR  -Restarted on her home diltiazem dose with adequate rate control  -Patient's anticoagulation was held initially and was started on heparin drip prior to procedure  -back on  home Eliquis       COPD  -Stable  -Continue inhalers     History of renal transplant  -Continue Prograf per nephrology recommendations; adjust the dose based on Prograf levels    Anemia of chronic disease  -Hemoglobin fluctuating between 7 and 8  -Transfuse as needed for hemoglobin less than 7 or symptomatic anemia  -Continue EPO injections     Hypothyroidism  -Continue Synthroid    VTE Prophylaxis:  Pharmacologic VTE prophylaxis orders are present.    Disposition  Planning:   Barriers to Discharge: Renal failure  Anticipated Date of Discharge: 12/18  Place of Discharge: home      Time: 35 minutes     Code Status and Medical Interventions: CPR (Attempt to Resuscitate); Full Support   Ordered at: 12/10/24 1607     Code Status (Patient has no pulse and is not breathing):    CPR (Attempt to Resuscitate)     Medical Interventions (Patient has pulse or is breathing):    Full Support       Signature: Electronically signed by Samir Jackman MD, 12/14/24, 11:01 [facility]  [facility] Hospitalist Team

## 2024-12-14 NOTE — PLAN OF CARE
Goal Outcome Evaluation:  Plan of Care Reviewed With: patient        Progress: no change  Outcome Evaluation: Patient confused this morning. Not allowing RN to place new IV. Needs 1 unit PRBC due to low HGB

## 2024-12-14 NOTE — PROGRESS NOTES
Daily Progress Note          Assessment  Hypoxic respiratory insufficiency multifactorial   negative respiratory panel      right IJ DVT, brachial artery aneurysmal dilation/AV fistula  No PE on CAT scan  Pulm edema with bilateral pleural effusion left > right  A-fib with RVR was on Eliquis as an outpatient     Pulmonary hypertension  2D echo 10/4/2024  EF 58 % (Biplane Amato's method).   Severe biatrial enlargement.  moderate mitral regurgitation. moderate tricuspid regurgitation.   Calculated pulmonary systolic pressure of 72 mmHg.   Dilated inferior vena cava consistent with a mean right atrial pressure of 15  mmHg.      ? 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 difficile 6/7/2024  History of ESBL E. coli and 6/5/2024  Hypothyroidism  CAD  History of DVT     Pulmonary hypertension  HTN        Recommendations:     Respiratory status is close to her baseline     oxygen supplement and titration to maintain saturation 90-95%: Currently on 2-3 L per nasal cannula     Cont Low-dose Revatio 10 mg 3 times daily   midodrine 15 mg 3 times daily started we will continue to monitor blood pressure   No nitrates     Diuresis by nephrology  Immunosuppressive agents Prograf and prednisone 5 mg for renal transplant      Anticoagulation: Eliquis     antibiotics completed IV Zosyn     Avoid sedatives            Chest x-ray 12/1/2024                    LOS: 14 days     Subjective     Patient reports chronic shortness of breath    Objective     Vital signs for last 24 hours:  Vitals:    12/14/24 0743 12/14/24 0843 12/14/24 1101 12/14/24 1203   BP: 102/59 109/60 114/53 108/54   BP Location: Right arm  Right arm    Patient Position: Lying  Lying    Pulse: 93 103 107 107   Resp: 16  16    Temp: 97.9 °F (36.6 °C)  97.8 °F (36.6 °C)    TempSrc: Oral  Oral    SpO2: 96%  90%    Weight:       Height:           Intake/Output last 3 shifts:  I/O last 3 completed  shifts:  In: 240 [P.O.:240]  Out: 2000   Intake/Output this shift:  No intake/output data recorded.      Radiology  Imaging Results (Last 24 Hours)       ** No results found for the last 24 hours. **            Labs:  Results from last 7 days   Lab Units 12/14/24  0252   WBC 10*3/mm3 10.02   HEMOGLOBIN g/dL 6.6*   HEMATOCRIT % 22.6*   PLATELETS 10*3/mm3 128*     Results from last 7 days   Lab Units 12/14/24  0252   SODIUM mmol/L 132*   POTASSIUM mmol/L 4.2   CHLORIDE mmol/L 99   CO2 mmol/L 25.3   BUN mg/dL 25*   CREATININE mg/dL 2.34*   CALCIUM mg/dL 8.4*   GLUCOSE mg/dL 97           Results from last 7 days   Lab Units 12/14/24  0252 12/13/24  0515 12/12/24  0347   ALBUMIN g/dL 2.8* 3.0* 3.0*                   Results from last 7 days   Lab Units 12/09/24  1018 12/08/24  0048   APTT seconds 62.0* 44.5*               Meds:   SCHEDULE  apixaban, 5 mg, Oral, BID  atorvastatin, 10 mg, Oral, Daily  cholestyramine light, 1 packet, Oral, Q24H  DULoxetine, 20 mg, Oral, Daily  epoetin rylee/rylee-epbx, 20,000 Units, Subcutaneous, Once per day on Monday Wednesday Friday  famotidine, 20 mg, Oral, Every Other Day  hydrOXYzine, 25 mg, Oral, Nightly  levothyroxine, 50 mcg, Oral, Q AM  lidocaine, 1 Application, Topical, Once per day on Monday Wednesday Friday  metoprolol tartrate, 12.5 mg, Oral, Q12H  midodrine, 15 mg, Oral, TID AC  mirtazapine, 15 mg, Oral, Nightly  nystatin, , Topical, Q12H  predniSONE, 5 mg, Oral, Daily With Breakfast  QUEtiapine, 25 mg, Oral, Nightly  sildenafil, 10 mg, Oral, TID  sodium chloride, 10 mL, Intravenous, Q12H  sodium chloride, 10 mL, Intravenous, Q12H  sodium chloride, 10 mL, Intravenous, Q12H  tacrolimus, 0.5 mg, Oral, Daily      Infusions     PRNs    acetaminophen    senna-docusate sodium **AND** polyethylene glycol **AND** bisacodyl **AND** bisacodyl    influenza vaccine    metoprolol tartrate    ondansetron    oxyCODONE    prochlorperazine    [COMPLETED] Insert Peripheral IV **AND** sodium  chloride    sodium chloride    sodium chloride    sodium chloride    sodium chloride    Physical Exam:  General Appearance:  Alert: Looks chronically ill but not in acute distress  HEENT:  Normocephalic, without obvious abnormality, Conjunctiva/corneas clear,.   Nares normal, no drainage     Neck:  Supple, symmetrical, trachea midline.   Lungs /Chest wall:   Mild bilateral basal rhonchi, respirations unlabored, symmetrical wall movement.     Heart:  Regular rate and rhythm, S1 S2 normal  Abdomen: Soft, non-tender, no masses, no organomegaly.    Extremities: No edema, no clubbing or cyanosis     ROS  Constitutional: Negative for chills, fever and malaise/fatigue.   HENT: Negative.    Eyes: Negative.    Cardiovascular: Negative.    Respiratory: Positive for mild cough and shortness of breath.    Skin: Negative.    Musculoskeletal: Negative.    Gastrointestinal: Negative.    Genitourinary: Negative.    Neurological: Generalized weakness      I reviewed the recent clinical results  I personally reviewed the latest radiological studies    Part of this note may be an electronic transcription/translation of spoken language to printed text using the Dragon Dictation System.

## 2024-12-14 NOTE — PROGRESS NOTES
"RENAL/KCC:     LOS: 14 days    Patient Care Team:  Kvng Guillen MD as PCP - General (Family Medicine)  Jak Gavin MD as Cardiologist (Cardiology)    Chief Complaint:  TONYA/CKD, Kidney transplant    Subjective     Interval History:   Chart reviewed  S/P HD yesterday    Objective     Vital Sign Min/Max for last 24 hours  Temp  Min: 97.5 °F (36.4 °C)  Max: 98.2 °F (36.8 °C)   BP  Min: 81/58  Max: 110/65   Pulse  Min: 93  Max: 118   Resp  Min: 13  Max: 18   SpO2  Min: 90 %  Max: 100 %   Flow (L/min) (Oxygen Therapy)  Min: 3  Max: 3   No data recorded     Flowsheet Rows      Flowsheet Row First Filed Value   Admission Height 167.6 cm (66\") Documented at 11/29/2024 1238   Admission Weight 74.5 kg (164 lb 3.9 oz) Documented at 11/29/2024 1238            No intake/output data recorded.  I/O last 3 completed shifts:  In: 240 [P.O.:240]  Out: 2000     Physical Exam:  GEN: Awake, NAD  ENT: PERRL, EOMI, MMM  NECK: Supple, no JVD  CHEST: CTAB, no W/R/C  CV: RRR, no M/G/R, +edema  ABD: Soft, NT, +BS  SKIN: Warm and Dry  NEURO: CN's intact      WBC WBC   Date Value Ref Range Status   12/14/2024 10.02 3.40 - 10.80 10*3/mm3 Final   12/13/2024 10.81 (H) 3.40 - 10.80 10*3/mm3 Final   12/12/2024 10.11 3.40 - 10.80 10*3/mm3 Final        HGB Hemoglobin   Date Value Ref Range Status   12/14/2024 6.6 (C) 12.0 - 15.9 g/dL Final   12/13/2024 7.5 (L) 12.0 - 15.9 g/dL Final   12/12/2024 7.8 (L) 12.0 - 15.9 g/dL Final        HCT Hematocrit   Date Value Ref Range Status   12/14/2024 22.6 (L) 34.0 - 46.6 % Final   12/13/2024 26.4 (L) 34.0 - 46.6 % Final   12/12/2024 27.2 (L) 34.0 - 46.6 % Final        Platlets No results found for: \"LABPLAT\"   MCV MCV   Date Value Ref Range Status   12/14/2024 95.4 79.0 - 97.0 fL Final   12/13/2024 93.6 79.0 - 97.0 fL Final   12/12/2024 94.4 79.0 - 97.0 fL Final            Sodium Sodium   Date Value Ref Range Status   12/14/2024 132 (L) 136 - 145 mmol/L Final   12/13/2024 131 (L) 136 - 145 mmol/L Final " "  12/12/2024 130 (L) 136 - 145 mmol/L Final      Potassium Potassium   Date Value Ref Range Status   12/14/2024 4.2 3.5 - 5.2 mmol/L Final   12/13/2024 4.5 3.5 - 5.2 mmol/L Final     Comment:     Specimen hemolyzed.  Result may be falsely elevated.   12/12/2024 4.2 3.5 - 5.2 mmol/L Final      Chloride Chloride   Date Value Ref Range Status   12/14/2024 99 98 - 107 mmol/L Final   12/13/2024 97 (L) 98 - 107 mmol/L Final   12/12/2024 97 (L) 98 - 107 mmol/L Final      CO2 CO2   Date Value Ref Range Status   12/14/2024 25.3 22.0 - 29.0 mmol/L Final   12/13/2024 22.1 22.0 - 29.0 mmol/L Final   12/12/2024 23.9 22.0 - 29.0 mmol/L Final      BUN BUN   Date Value Ref Range Status   12/14/2024 25 (H) 8 - 23 mg/dL Final   12/13/2024 33 (H) 8 - 23 mg/dL Final   12/12/2024 28 (H) 8 - 23 mg/dL Final      Creatinine Creatinine   Date Value Ref Range Status   12/14/2024 2.34 (H) 0.57 - 1.00 mg/dL Final   12/13/2024 3.06 (H) 0.57 - 1.00 mg/dL Final   12/12/2024 2.68 (H) 0.57 - 1.00 mg/dL Final      Calcium Calcium   Date Value Ref Range Status   12/14/2024 8.4 (L) 8.6 - 10.5 mg/dL Final   12/13/2024 8.9 8.6 - 10.5 mg/dL Final   12/12/2024 8.8 8.6 - 10.5 mg/dL Final      PO4 No results found for: \"CAPO4\"   Albumin Albumin   Date Value Ref Range Status   12/14/2024 2.8 (L) 3.5 - 5.2 g/dL Final   12/13/2024 3.0 (L) 3.5 - 5.2 g/dL Final   12/12/2024 3.0 (L) 3.5 - 5.2 g/dL Final      Magnesium No results found for: \"MG\"     Uric Acid No results found for: \"URICACID\"        Results Review:     I reviewed the patient's new clinical results.    apixaban, 5 mg, Oral, BID  atorvastatin, 10 mg, Oral, Daily  cholestyramine light, 1 packet, Oral, Q24H  DULoxetine, 20 mg, Oral, Daily  epoetin rylee/rylee-epbx, 20,000 Units, Subcutaneous, Once per day on Monday Wednesday Friday  famotidine, 20 mg, Oral, Every Other Day  hydrOXYzine, 25 mg, Oral, Nightly  levothyroxine, 50 mcg, Oral, Q AM  lidocaine, 1 Application, Topical, Once per day on Monday " Wednesday Friday  metoprolol tartrate, 12.5 mg, Oral, Q12H  midodrine, 15 mg, Oral, TID AC  mirtazapine, 15 mg, Oral, Nightly  nystatin, , Topical, Q12H  predniSONE, 5 mg, Oral, Daily With Breakfast  QUEtiapine, 25 mg, Oral, Nightly  sildenafil, 10 mg, Oral, TID  sodium chloride, 10 mL, Intravenous, Q12H  sodium chloride, 10 mL, Intravenous, Q12H  sodium chloride, 10 mL, Intravenous, Q12H  tacrolimus, 0.5 mg, Oral, Daily        Medication Review: Reviewed    Assessment & Plan     1) Kidney Transplant - On Prograf  2) TONYA - HARRY/ATN, Baseline Cr under 1.  3) DVT  4) Hypotension  5) Anemia of CKD  6) AMS    Plan: ATN now requiring acute dialysis MWF.  Midodrine for BP support.  Transplant US with no hydro and no arterial or venous stenosis.  Prograf level was 3.4 on 11/30 and jumped to 15.2 on 12/3 and then 17.6 on 12/7. Decreased Prograf to 0.5 mg daily and repeat level is down to 10.  F/U most recent level and adjust dosing accordingly.  On AC and Vascular following.  Repleting iron.  Epo ordered. Transfuse today.  ABX per primary.  Pulm following as well.        Naun Falcon MD  [facility]  12/14/24  10:31 EST

## 2024-12-15 LAB
ALBUMIN SERPL-MCNC: 3.1 G/DL (ref 3.5–5.2)
ANION GAP SERPL CALCULATED.3IONS-SCNC: 8.1 MMOL/L (ref 5–15)
BASOPHILS # BLD AUTO: 0.03 10*3/MM3 (ref 0–0.2)
BASOPHILS NFR BLD AUTO: 0.3 % (ref 0–1.5)
BH BB BLOOD EXPIRATION DATE: NORMAL
BH BB BLOOD TYPE BARCODE: 5100
BH BB DISPENSE STATUS: NORMAL
BH BB PRODUCT CODE: NORMAL
BH BB UNIT NUMBER: NORMAL
BUN SERPL-MCNC: 33 MG/DL (ref 8–23)
BUN/CREAT SERPL: 12 (ref 7–25)
CALCIUM SPEC-SCNC: 8.8 MG/DL (ref 8.6–10.5)
CHLORIDE SERPL-SCNC: 98 MMOL/L (ref 98–107)
CO2 SERPL-SCNC: 24.9 MMOL/L (ref 22–29)
CREAT SERPL-MCNC: 2.74 MG/DL (ref 0.57–1)
CROSSMATCH INTERPRETATION: NORMAL
DEPRECATED RDW RBC AUTO: 80.7 FL (ref 37–54)
EGFRCR SERPLBLD CKD-EPI 2021: 17.3 ML/MIN/1.73
EOSINOPHIL # BLD AUTO: 0.02 10*3/MM3 (ref 0–0.4)
EOSINOPHIL NFR BLD AUTO: 0.2 % (ref 0.3–6.2)
ERYTHROCYTE [DISTWIDTH] IN BLOOD BY AUTOMATED COUNT: 24.2 % (ref 12.3–15.4)
GLUCOSE SERPL-MCNC: 100 MG/DL (ref 65–99)
HCT VFR BLD AUTO: 28 % (ref 34–46.6)
HGB BLD-MCNC: 8.1 G/DL (ref 12–15.9)
IMM GRANULOCYTES # BLD AUTO: 0.07 10*3/MM3 (ref 0–0.05)
IMM GRANULOCYTES NFR BLD AUTO: 0.7 % (ref 0–0.5)
LYMPHOCYTES # BLD AUTO: 1.89 10*3/MM3 (ref 0.7–3.1)
LYMPHOCYTES NFR BLD AUTO: 18.8 % (ref 19.6–45.3)
MCH RBC QN AUTO: 27.7 PG (ref 26.6–33)
MCHC RBC AUTO-ENTMCNC: 28.9 G/DL (ref 31.5–35.7)
MCV RBC AUTO: 95.9 FL (ref 79–97)
MONOCYTES # BLD AUTO: 1.26 10*3/MM3 (ref 0.1–0.9)
MONOCYTES NFR BLD AUTO: 12.5 % (ref 5–12)
NEUTROPHILS NFR BLD AUTO: 6.81 10*3/MM3 (ref 1.7–7)
NEUTROPHILS NFR BLD AUTO: 67.5 % (ref 42.7–76)
NRBC BLD AUTO-RTO: 0.7 /100 WBC (ref 0–0.2)
PHOSPHATE SERPL-MCNC: 4.8 MG/DL (ref 2.5–4.5)
PLATELET # BLD AUTO: 150 10*3/MM3 (ref 140–450)
PMV BLD AUTO: 10.2 FL (ref 6–12)
POTASSIUM SERPL-SCNC: 4.3 MMOL/L (ref 3.5–5.2)
RBC # BLD AUTO: 2.92 10*6/MM3 (ref 3.77–5.28)
SODIUM SERPL-SCNC: 131 MMOL/L (ref 136–145)
TACROLIMUS BLD LC/MS/MS-MCNC: 9.1 NG/ML (ref 2–20)
UNIT  ABO: NORMAL
UNIT  RH: NORMAL
WBC NRBC COR # BLD AUTO: 10.08 10*3/MM3 (ref 3.4–10.8)

## 2024-12-15 PROCEDURE — 63710000001 TACROLIMUS PER 1 MG: Performed by: INTERNAL MEDICINE

## 2024-12-15 PROCEDURE — 63710000001 PREDNISONE PER 5 MG: Performed by: STUDENT IN AN ORGANIZED HEALTH CARE EDUCATION/TRAINING PROGRAM

## 2024-12-15 PROCEDURE — 25010000002 ONDANSETRON PER 1 MG: Performed by: STUDENT IN AN ORGANIZED HEALTH CARE EDUCATION/TRAINING PROGRAM

## 2024-12-15 PROCEDURE — 80069 RENAL FUNCTION PANEL: CPT | Performed by: STUDENT IN AN ORGANIZED HEALTH CARE EDUCATION/TRAINING PROGRAM

## 2024-12-15 PROCEDURE — 85025 COMPLETE CBC W/AUTO DIFF WBC: CPT | Performed by: INTERNAL MEDICINE

## 2024-12-15 RX ADMIN — MIDODRINE HYDROCHLORIDE 15 MG: 5 TABLET ORAL at 07:45

## 2024-12-15 RX ADMIN — LEVOTHYROXINE SODIUM 50 MCG: 50 TABLET ORAL at 05:25

## 2024-12-15 RX ADMIN — Medication 10 ML: at 07:54

## 2024-12-15 RX ADMIN — HYDROXYZINE HYDROCHLORIDE 25 MG: 25 TABLET ORAL at 21:15

## 2024-12-15 RX ADMIN — Medication 10 ML: at 07:45

## 2024-12-15 RX ADMIN — NYSTATIN: 100000 POWDER TOPICAL at 07:54

## 2024-12-15 RX ADMIN — PREDNISONE 5 MG: 5 TABLET ORAL at 07:45

## 2024-12-15 RX ADMIN — SILDENAFIL 10 MG: 20 TABLET ORAL at 07:45

## 2024-12-15 RX ADMIN — CHOLESTYRAMINE 4 G: 4 POWDER, FOR SUSPENSION ORAL at 10:58

## 2024-12-15 RX ADMIN — Medication 10 ML: at 21:16

## 2024-12-15 RX ADMIN — OXYCODONE 10 MG: 5 TABLET ORAL at 21:15

## 2024-12-15 RX ADMIN — DULOXETINE HYDROCHLORIDE 20 MG: 20 CAPSULE, DELAYED RELEASE ORAL at 07:45

## 2024-12-15 RX ADMIN — ATORVASTATIN CALCIUM 10 MG: 10 TABLET ORAL at 07:46

## 2024-12-15 RX ADMIN — TACROLIMUS 0.5 MG: 0.5 CAPSULE ORAL at 07:45

## 2024-12-15 RX ADMIN — OXYCODONE 10 MG: 5 TABLET ORAL at 07:45

## 2024-12-15 RX ADMIN — MIDODRINE HYDROCHLORIDE 15 MG: 5 TABLET ORAL at 10:58

## 2024-12-15 RX ADMIN — MIDODRINE HYDROCHLORIDE 15 MG: 5 TABLET ORAL at 17:51

## 2024-12-15 RX ADMIN — MIRTAZAPINE 15 MG: 15 TABLET, FILM COATED ORAL at 21:15

## 2024-12-15 RX ADMIN — QUETIAPINE FUMARATE 25 MG: 25 TABLET ORAL at 21:16

## 2024-12-15 RX ADMIN — SILDENAFIL 10 MG: 20 TABLET ORAL at 17:51

## 2024-12-15 RX ADMIN — OXYCODONE 10 MG: 5 TABLET ORAL at 14:20

## 2024-12-15 RX ADMIN — ONDANSETRON 4 MG: 2 INJECTION INTRAMUSCULAR; INTRAVENOUS at 16:39

## 2024-12-15 RX ADMIN — Medication 12.5 MG: at 21:16

## 2024-12-15 RX ADMIN — SILDENAFIL 10 MG: 20 TABLET ORAL at 21:15

## 2024-12-15 RX ADMIN — NYSTATIN: 100000 POWDER TOPICAL at 21:16

## 2024-12-15 RX ADMIN — APIXABAN 5 MG: 5 TABLET, FILM COATED ORAL at 07:45

## 2024-12-15 RX ADMIN — APIXABAN 5 MG: 5 TABLET, FILM COATED ORAL at 21:15

## 2024-12-15 NOTE — PLAN OF CARE
Goal Outcome Evaluation:   Pt Has been estra sleepy today but easy to arouse. Did wound care on pt. Took pictures.

## 2024-12-15 NOTE — PROGRESS NOTES
"RENAL/KCC:     LOS: 15 days    Patient Care Team:  Kvng Guillen MD as PCP - General (Family Medicine)  Jak Gavin MD as Cardiologist (Cardiology)    Chief Complaint:  TONYA/CKD, Kidney transplant    Subjective     Interval History:   Chart reviewed  No new complaints    Objective     Vital Sign Min/Max for last 24 hours  Temp  Min: 97.3 °F (36.3 °C)  Max: 98.7 °F (37.1 °C)   BP  Min: 104/61  Max: 129/72   Pulse  Min: 92  Max: 115   Resp  Min: 8  Max: 16   SpO2  Min: 90 %  Max: 99 %   Flow (L/min) (Oxygen Therapy)  Min: 3  Max: 3   No data recorded     Flowsheet Rows      Flowsheet Row First Filed Value   Admission Height 167.6 cm (66\") Documented at 11/29/2024 1238   Admission Weight 74.5 kg (164 lb 3.9 oz) Documented at 11/29/2024 1238            No intake/output data recorded.  I/O last 3 completed shifts:  In: 1003.5 [P.O.:480; Blood:523.5]  Out: 0     Physical Exam:  GEN: Awake, NAD  ENT: PERRL, EOMI, MMM  NECK: Supple, no JVD  CHEST: CTAB, no W/R/C  CV: RRR, no M/G/R, +edema  ABD: Soft, NT, +BS  SKIN: Warm and Dry  NEURO: CN's intact      WBC WBC   Date Value Ref Range Status   12/15/2024 10.08 3.40 - 10.80 10*3/mm3 Final   12/14/2024 10.02 3.40 - 10.80 10*3/mm3 Final   12/13/2024 10.81 (H) 3.40 - 10.80 10*3/mm3 Final   12/12/2024 10.11 3.40 - 10.80 10*3/mm3 Final        HGB Hemoglobin   Date Value Ref Range Status   12/15/2024 8.1 (L) 12.0 - 15.9 g/dL Final   12/14/2024 8.4 (L) 12.0 - 15.9 g/dL Final   12/14/2024 6.6 (C) 12.0 - 15.9 g/dL Final   12/13/2024 7.5 (L) 12.0 - 15.9 g/dL Final   12/12/2024 7.8 (L) 12.0 - 15.9 g/dL Final        HCT Hematocrit   Date Value Ref Range Status   12/15/2024 28.0 (L) 34.0 - 46.6 % Final   12/14/2024 28.3 (L) 34.0 - 46.6 % Final   12/14/2024 22.6 (L) 34.0 - 46.6 % Final   12/13/2024 26.4 (L) 34.0 - 46.6 % Final   12/12/2024 27.2 (L) 34.0 - 46.6 % Final        Platlets No results found for: \"LABPLAT\"   MCV MCV   Date Value Ref Range Status   12/15/2024 95.9 79.0 - 97.0 fL " "Final   12/14/2024 95.4 79.0 - 97.0 fL Final   12/13/2024 93.6 79.0 - 97.0 fL Final   12/12/2024 94.4 79.0 - 97.0 fL Final            Sodium Sodium   Date Value Ref Range Status   12/15/2024 131 (L) 136 - 145 mmol/L Final   12/14/2024 132 (L) 136 - 145 mmol/L Final   12/13/2024 131 (L) 136 - 145 mmol/L Final      Potassium Potassium   Date Value Ref Range Status   12/15/2024 4.3 3.5 - 5.2 mmol/L Final   12/14/2024 4.2 3.5 - 5.2 mmol/L Final   12/13/2024 4.5 3.5 - 5.2 mmol/L Final     Comment:     Specimen hemolyzed.  Result may be falsely elevated.      Chloride Chloride   Date Value Ref Range Status   12/15/2024 98 98 - 107 mmol/L Final   12/14/2024 99 98 - 107 mmol/L Final   12/13/2024 97 (L) 98 - 107 mmol/L Final      CO2 CO2   Date Value Ref Range Status   12/15/2024 24.9 22.0 - 29.0 mmol/L Final   12/14/2024 25.3 22.0 - 29.0 mmol/L Final   12/13/2024 22.1 22.0 - 29.0 mmol/L Final      BUN BUN   Date Value Ref Range Status   12/15/2024 33 (H) 8 - 23 mg/dL Final   12/14/2024 25 (H) 8 - 23 mg/dL Final   12/13/2024 33 (H) 8 - 23 mg/dL Final      Creatinine Creatinine   Date Value Ref Range Status   12/15/2024 2.74 (H) 0.57 - 1.00 mg/dL Final   12/14/2024 2.34 (H) 0.57 - 1.00 mg/dL Final   12/13/2024 3.06 (H) 0.57 - 1.00 mg/dL Final      Calcium Calcium   Date Value Ref Range Status   12/15/2024 8.8 8.6 - 10.5 mg/dL Final   12/14/2024 8.4 (L) 8.6 - 10.5 mg/dL Final   12/13/2024 8.9 8.6 - 10.5 mg/dL Final      PO4 No results found for: \"CAPO4\"   Albumin Albumin   Date Value Ref Range Status   12/15/2024 3.1 (L) 3.5 - 5.2 g/dL Final   12/14/2024 2.8 (L) 3.5 - 5.2 g/dL Final   12/13/2024 3.0 (L) 3.5 - 5.2 g/dL Final      Magnesium No results found for: \"MG\"     Uric Acid No results found for: \"URICACID\"        Results Review:     I reviewed the patient's new clinical results.    apixaban, 5 mg, Oral, BID  atorvastatin, 10 mg, Oral, Daily  cholestyramine light, 1 packet, Oral, Q24H  DULoxetine, 20 mg, Oral, " Daily  epoetin rylee/rylee-epbx, 20,000 Units, Subcutaneous, Once per day on Monday Wednesday Friday  famotidine, 20 mg, Oral, Every Other Day  hydrOXYzine, 25 mg, Oral, Nightly  levothyroxine, 50 mcg, Oral, Q AM  lidocaine, 1 Application, Topical, Once per day on Monday Wednesday Friday  metoprolol tartrate, 12.5 mg, Oral, Q12H  midodrine, 15 mg, Oral, TID AC  mirtazapine, 15 mg, Oral, Nightly  nystatin, , Topical, Q12H  predniSONE, 5 mg, Oral, Daily With Breakfast  QUEtiapine, 25 mg, Oral, Nightly  sildenafil, 10 mg, Oral, TID  sodium chloride, 10 mL, Intravenous, Q12H  sodium chloride, 10 mL, Intravenous, Q12H  sodium chloride, 10 mL, Intravenous, Q12H  tacrolimus, 0.5 mg, Oral, Daily        Medication Review: Reviewed    Assessment & Plan     1) Kidney Transplant - On Prograf  2) TONYA - HARRY/ATN/CNI toxicity, Baseline Cr under 1.  3) DVT  4) Hypotension  5) Anemia of CKD  6) AMS    Plan: ATN now requiring acute dialysis MWF.  Cr still rising in between HD.  Midodrine for BP support.  Transplant US with no hydro and no arterial or venous stenosis.  Prograf level was 3.4 on 11/30 and jumped to 15.2 on 12/3 and then 17.6 on 12/7. Decreased Prograf to 0.5 mg daily and repeat level is down to 9.1.  F/U more recent level and continue to adjust dosing accordingly.  On AC.  Repleted iron.  Epo ordered. Transfuse prn.  ABX per primary.  Pulm following as well.      OK to rehab tomorrow if outpatient HD confirmed      Naun Falcon MD  [facility]  12/15/24  08:27 EST

## 2024-12-15 NOTE — PROGRESS NOTES
[facility] MEDICINE SERVICE  DAILY PROGRESS NOTE    NAME: Jaja Carcamo  : 1945  MRN: [id number]      LOS: 15 days     PROVIDER OF SERVICE: Saimr Jackman MD    Chief Complaint: Acute deep vein thrombosis (DVT) of other vein of right upper extremity    Subjective:     Interval History:      No specific complaint, feeling okay today, discussed with the patient the plan that she has not had an outpatient HD chair rate which is the limiting factor for discharge    12/15  No complaint doing well, she is on dialysis , did explain to the patient again that once her outpatient chair is confirmed tomorrow she should be able to hopefully be discharge  Objective:     Vital Signs  Temp:  [97.3 °F (36.3 °C)-98.7 °F (37.1 °C)] 98 °F (36.7 °C)  Heart Rate:  [] 96  Resp:  [8-15] 9  BP: (104-129)/(49-72) 113/64  Flow (L/min) (Oxygen Therapy):  [3] 3   Body mass index is 30.49 kg/m².    Physical Exam  General Appearance:  Alert, cooperative, no distress, appears stated age  Head:   Normocephalic, without obvious abnormality, atraumatic  Eyes:   PERRL, conjunctiva/corneas clear, EOM's intact, fundi benign, both eyes  Ears:    Normal TM's and external ear canals, both ears  Nose:Nares normal, septum midline, mucosa normal, no drainage or sinus tenderness  Throat:Lips, mucosa, and tongue normal; teeth and gums normal  Neck:Supple, symmetrical, trachea midline, no adenopathy, thyroid: not enlarged, symmetric, no tenderness/mass/nodules, no carotid bruit or JVD  Lungs:             Clear to auscultation bilaterally, respirations unlabored  Heart:  Regular rate and rhythm, S1, S2 normal, no murmur, rub or gallop  Abdomen:  Soft, non-tender, bowel sounds active all four quadrants,  no masses, no organomegaly  Extremities:RUE nonpitting edema with nonfunctioning AV fistula, enlargement of right upper arm veins  Pulses:2+ and symmetric  Skin:Skin color, texture, turgor normal, no rashes or  lesions  Neurologic: Normal     Diagnostic Data    Results from last 7 days   Lab Units 12/15/24  0426   WBC 10*3/mm3 10.08   HEMOGLOBIN g/dL 8.1*   HEMATOCRIT % 28.0*   PLATELETS 10*3/mm3 150   GLUCOSE mg/dL 100*   CREATININE mg/dL 2.74*   BUN mg/dL 33*   SODIUM mmol/L 131*   POTASSIUM mmol/L 4.3   ANION GAP mmol/L 8.1       No radiology results for the last day      I reviewed the patient's new clinical results.    Assessment/Plan:     Active and Resolved Problems  Active Hospital Problems    Diagnosis  POA    **Acute deep vein thrombosis (DVT) of other vein of right upper extremity [I82.621]  Yes    Acute DVT (deep venous thrombosis) [I82.409]  Yes      Resolved Hospital Problems   No resolved problems to display.      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 as well as high-grade venous stenosis  -Patient was already on Eliquis  -Vascular surgery consulted; patient underwent balloon angioplasty of right cephalic vein and subclavian vein    TONYA  -Likely contrast-induced nephropathy with ATN  -Renal function worse despite IV fluid resuscitation and patient now appears to be volume overloaded  -Initiated on acute HD via her AV fistula  -Patient is going to require dialysis Monday Wednesday Friday  -Awaiting outpatient HD unit chair availability with anticipate that hopefully by Monday then can discharge the patient at that point     Atrial flutter with RVR  -Restarted on her home diltiazem dose with adequate rate control  -Patient's anticoagulation was held initially and was started on heparin drip prior to procedure  -back on  home Eliquis       COPD  -Stable  -Continue inhalers     History of renal transplant  -Continue Prograf per nephrology recommendations; adjust the dose based on Prograf levels    Anemia of chronic disease  -Hemoglobin fluctuating between 7 and 8  -Transfuse as needed for hemoglobin less than 7 or symptomatic  anemia  -Continue EPO injections     Hypothyroidism  -Continue Synthroid    VTE Prophylaxis:  Pharmacologic VTE prophylaxis orders are present.    Disposition Planning:   Barriers to Discharge: Awaiting outpatient dialysis chair time to be confirmed   Anticipated Date of Discharge: 12/16  Place of Discharge: home      Time: 35 minutes     Code Status and Medical Interventions: CPR (Attempt to Resuscitate); Full Support   Ordered at: 12/10/24 1607     Code Status (Patient has no pulse and is not breathing):    CPR (Attempt to Resuscitate)     Medical Interventions (Patient has pulse or is breathing):    Full Support       Signature: Electronically signed by Samir Jackman MD, 12/15/24, 11:17 EST.  [facility] Hospitalist Team

## 2024-12-15 NOTE — PLAN OF CARE
Goal Outcome Evaluation:  Plan of Care Reviewed With: patient        Progress: no change  Outcome Evaluation: Patient alert and oriented. VSS. Hgb stable this morning. Complaints of generalized pain, medicated per MAR orders.

## 2024-12-15 NOTE — PROGRESS NOTES
Daily Progress Note          Assessment  Hypoxic respiratory insufficiency multifactorial   negative respiratory panel      right IJ DVT, brachial artery aneurysmal dilation/AV fistula  No PE on CAT scan  Pulm edema with bilateral pleural effusion left > right  A-fib with RVR was on Eliquis as an outpatient     Pulmonary hypertension  2D echo 10/4/2024  EF 58 % (Biplane Amato's method).   Severe biatrial enlargement.  moderate mitral regurgitation. moderate tricuspid regurgitation.   Calculated pulmonary systolic pressure of 72 mmHg.   Dilated inferior vena cava consistent with a mean right atrial pressure of 15  mmHg.      ? 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 difficile 6/7/2024  History of ESBL E. coli and 6/5/2024  Hypothyroidism  CAD  History of DVT     Pulmonary hypertension  HTN        Recommendations:     Respiratory status is close to her baseline     oxygen supplement and titration to maintain saturation 90-95%: Currently on 2-3 L per nasal cannula     Cont Low-dose Revatio 10 mg 3 times daily   midodrine 15 mg 3 times daily started we will continue to monitor blood pressure   No nitrates     Diuresis by nephrology  Immunosuppressive agents Prograf and prednisone 5 mg for renal transplant      Anticoagulation: Eliquis     antibiotics completed IV Zosyn     Avoid sedatives            Chest x-ray 12/1/2024                    LOS: 15 days     Subjective     Patient reports chronic shortness of breath    Objective     Vital signs for last 24 hours:  Vitals:    12/14/24 2323 12/15/24 0425 12/15/24 0500 12/15/24 0744   BP: 118/63 122/59  104/61   BP Location:  Left arm  Left arm   Patient Position:  Lying  Lying   Pulse: 96 93  96   Resp:  8  10   Temp: 98.5 °F (36.9 °C)  97.3 °F (36.3 °C) 98.6 °F (37 °C)   TempSrc: Oral  Oral Oral   SpO2: 98% 95%  94%   Weight:       Height:           Intake/Output last 3 shifts:  I/O last 3  completed shifts:  In: 1003.5 [P.O.:480; Blood:523.5]  Out: 0   Intake/Output this shift:  No intake/output data recorded.      Radiology  Imaging Results (Last 24 Hours)       ** No results found for the last 24 hours. **            Labs:  Results from last 7 days   Lab Units 12/15/24  0426   WBC 10*3/mm3 10.08   HEMOGLOBIN g/dL 8.1*   HEMATOCRIT % 28.0*   PLATELETS 10*3/mm3 150     Results from last 7 days   Lab Units 12/15/24  0426   SODIUM mmol/L 131*   POTASSIUM mmol/L 4.3   CHLORIDE mmol/L 98   CO2 mmol/L 24.9   BUN mg/dL 33*   CREATININE mg/dL 2.74*   CALCIUM mg/dL 8.8   GLUCOSE mg/dL 100*           Results from last 7 days   Lab Units 12/15/24  0426 12/14/24  0252 12/13/24  0515   ALBUMIN g/dL 3.1* 2.8* 3.0*                   Results from last 7 days   Lab Units 12/09/24  1018   APTT seconds 62.0*               Meds:   SCHEDULE  apixaban, 5 mg, Oral, BID  atorvastatin, 10 mg, Oral, Daily  cholestyramine light, 1 packet, Oral, Q24H  DULoxetine, 20 mg, Oral, Daily  epoetin rylee/rylee-epbx, 20,000 Units, Subcutaneous, Once per day on Monday Wednesday Friday  famotidine, 20 mg, Oral, Every Other Day  hydrOXYzine, 25 mg, Oral, Nightly  levothyroxine, 50 mcg, Oral, Q AM  lidocaine, 1 Application, Topical, Once per day on Monday Wednesday Friday  metoprolol tartrate, 12.5 mg, Oral, Q12H  midodrine, 15 mg, Oral, TID AC  mirtazapine, 15 mg, Oral, Nightly  nystatin, , Topical, Q12H  predniSONE, 5 mg, Oral, Daily With Breakfast  QUEtiapine, 25 mg, Oral, Nightly  sildenafil, 10 mg, Oral, TID  sodium chloride, 10 mL, Intravenous, Q12H  sodium chloride, 10 mL, Intravenous, Q12H  sodium chloride, 10 mL, Intravenous, Q12H  tacrolimus, 0.5 mg, Oral, Daily      Infusions     PRNs    acetaminophen    senna-docusate sodium **AND** polyethylene glycol **AND** bisacodyl **AND** bisacodyl    influenza vaccine    metoprolol tartrate    ondansetron    oxyCODONE    prochlorperazine    [COMPLETED] Insert Peripheral IV **AND** sodium  chloride    sodium chloride    sodium chloride    sodium chloride    sodium chloride    Physical Exam:  General Appearance:  Alert: Looks chronically ill but not in acute distress  HEENT:  Normocephalic, without obvious abnormality, Conjunctiva/corneas clear,.   Nares normal, no drainage     Neck:  Supple, symmetrical, trachea midline.   Lungs /Chest wall:   Mild bilateral basal rhonchi, respirations unlabored, symmetrical wall movement.     Heart:  Regular rate and rhythm, S1 S2 normal  Abdomen: Soft, non-tender, no masses, no organomegaly.    Extremities: No edema, no clubbing or cyanosis     ROS  Constitutional: Negative for chills, fever and malaise/fatigue.   HENT: Negative.    Eyes: Negative.    Cardiovascular: Negative.    Respiratory: Positive for mild cough and shortness of breath.    Skin: Negative.    Musculoskeletal: Negative.    Gastrointestinal: Negative.    Genitourinary: Negative.    Neurological: Generalized weakness and memory difficulties      I reviewed the recent clinical results  I personally reviewed the latest radiological studies    Part of this note may be an electronic transcription/translation of spoken language to printed text using the Dragon Dictation System.

## 2024-12-15 NOTE — CASE MANAGEMENT/SOCIAL WORK
Continued Stay Note  DAHIANA Gay     Patient Name: Jaja Carcamo  MRN: [id number]  Today's Date: 12/15/2024    Admit Date: 11/29/2024    Plan: MT PLAN: [facility], okay to return. No PASRR or Precert needed. New HD M/W/F [facility]. May need transport.   Discharge Plan       Row Name 12/15/24 5022       Plan    Plan Comments CM logged into [facility] portal - referral still pending and awaiting final clearance.             Tiana Marie RN     Office: [phone]  Fax: [phone]

## 2024-12-16 ENCOUNTER — APPOINTMENT (OUTPATIENT)
Dept: NEPHROLOGY | Facility: HOSPITAL | Age: 79
End: 2024-12-16
Payer: MEDICARE

## 2024-12-16 LAB
ALBUMIN SERPL-MCNC: 3 G/DL (ref 3.5–5.2)
ANION GAP SERPL CALCULATED.3IONS-SCNC: 7.8 MMOL/L (ref 5–15)
BASOPHILS # BLD AUTO: 0.02 10*3/MM3 (ref 0–0.2)
BASOPHILS NFR BLD AUTO: 0.2 % (ref 0–1.5)
BUN SERPL-MCNC: 39 MG/DL (ref 8–23)
BUN/CREAT SERPL: 13.9 (ref 7–25)
CALCIUM SPEC-SCNC: 9 MG/DL (ref 8.6–10.5)
CHLORIDE SERPL-SCNC: 98 MMOL/L (ref 98–107)
CO2 SERPL-SCNC: 26.2 MMOL/L (ref 22–29)
CREAT SERPL-MCNC: 2.8 MG/DL (ref 0.57–1)
DEPRECATED RDW RBC AUTO: 84.6 FL (ref 37–54)
EGFRCR SERPLBLD CKD-EPI 2021: 16.9 ML/MIN/1.73
EOSINOPHIL # BLD AUTO: 0.01 10*3/MM3 (ref 0–0.4)
EOSINOPHIL NFR BLD AUTO: 0.1 % (ref 0.3–6.2)
ERYTHROCYTE [DISTWIDTH] IN BLOOD BY AUTOMATED COUNT: 25.2 % (ref 12.3–15.4)
GLUCOSE SERPL-MCNC: 116 MG/DL (ref 65–99)
HCT VFR BLD AUTO: 27.6 % (ref 34–46.6)
HGB BLD-MCNC: 8.1 G/DL (ref 12–15.9)
IMM GRANULOCYTES # BLD AUTO: 0.08 10*3/MM3 (ref 0–0.05)
IMM GRANULOCYTES NFR BLD AUTO: 0.8 % (ref 0–0.5)
LYMPHOCYTES # BLD AUTO: 1.62 10*3/MM3 (ref 0.7–3.1)
LYMPHOCYTES NFR BLD AUTO: 15.3 % (ref 19.6–45.3)
MCH RBC QN AUTO: 28.4 PG (ref 26.6–33)
MCHC RBC AUTO-ENTMCNC: 29.3 G/DL (ref 31.5–35.7)
MCV RBC AUTO: 96.8 FL (ref 79–97)
MONOCYTES # BLD AUTO: 1.26 10*3/MM3 (ref 0.1–0.9)
MONOCYTES NFR BLD AUTO: 11.9 % (ref 5–12)
NEUTROPHILS NFR BLD AUTO: 7.61 10*3/MM3 (ref 1.7–7)
NEUTROPHILS NFR BLD AUTO: 71.7 % (ref 42.7–76)
NRBC BLD AUTO-RTO: 0.7 /100 WBC (ref 0–0.2)
PHOSPHATE SERPL-MCNC: 5.2 MG/DL (ref 2.5–4.5)
PLATELET # BLD AUTO: 130 10*3/MM3 (ref 140–450)
PMV BLD AUTO: 10.4 FL (ref 6–12)
POTASSIUM SERPL-SCNC: 4.6 MMOL/L (ref 3.5–5.2)
RBC # BLD AUTO: 2.85 10*6/MM3 (ref 3.77–5.28)
SODIUM SERPL-SCNC: 132 MMOL/L (ref 136–145)
WBC NRBC COR # BLD AUTO: 10.6 10*3/MM3 (ref 3.4–10.8)

## 2024-12-16 PROCEDURE — 63710000001 TACROLIMUS PER 1 MG: Performed by: INTERNAL MEDICINE

## 2024-12-16 PROCEDURE — 85025 COMPLETE CBC W/AUTO DIFF WBC: CPT | Performed by: INTERNAL MEDICINE

## 2024-12-16 PROCEDURE — 63710000001 PREDNISONE PER 5 MG: Performed by: STUDENT IN AN ORGANIZED HEALTH CARE EDUCATION/TRAINING PROGRAM

## 2024-12-16 PROCEDURE — 25010000002 EPOETIN ALFA-EPBX 20000 UNIT/ML SOLUTION: Performed by: INTERNAL MEDICINE

## 2024-12-16 PROCEDURE — 80069 RENAL FUNCTION PANEL: CPT | Performed by: STUDENT IN AN ORGANIZED HEALTH CARE EDUCATION/TRAINING PROGRAM

## 2024-12-16 RX ORDER — TACROLIMUS 0.5 MG/1
0.5 CAPSULE ORAL EVERY 12 HOURS
Status: DISCONTINUED | OUTPATIENT
Start: 2024-12-16 | End: 2024-12-19 | Stop reason: HOSPADM

## 2024-12-16 RX ADMIN — QUETIAPINE FUMARATE 25 MG: 25 TABLET ORAL at 20:56

## 2024-12-16 RX ADMIN — MIDODRINE HYDROCHLORIDE 15 MG: 5 TABLET ORAL at 17:01

## 2024-12-16 RX ADMIN — ATORVASTATIN CALCIUM 10 MG: 10 TABLET ORAL at 11:24

## 2024-12-16 RX ADMIN — FAMOTIDINE 20 MG: 20 TABLET, FILM COATED ORAL at 11:21

## 2024-12-16 RX ADMIN — Medication 10 ML: at 20:58

## 2024-12-16 RX ADMIN — Medication 12.5 MG: at 20:58

## 2024-12-16 RX ADMIN — TACROLIMUS 0.5 MG: 0.5 CAPSULE ORAL at 11:25

## 2024-12-16 RX ADMIN — Medication 10 ML: at 20:57

## 2024-12-16 RX ADMIN — NYSTATIN: 100000 POWDER TOPICAL at 11:27

## 2024-12-16 RX ADMIN — NYSTATIN: 100000 POWDER TOPICAL at 20:56

## 2024-12-16 RX ADMIN — APIXABAN 5 MG: 5 TABLET, FILM COATED ORAL at 20:56

## 2024-12-16 RX ADMIN — APIXABAN 5 MG: 5 TABLET, FILM COATED ORAL at 11:23

## 2024-12-16 RX ADMIN — HYDROXYZINE HYDROCHLORIDE 25 MG: 25 TABLET ORAL at 20:56

## 2024-12-16 RX ADMIN — Medication 10 ML: at 11:27

## 2024-12-16 RX ADMIN — TACROLIMUS 0.5 MG: 0.5 CAPSULE ORAL at 20:56

## 2024-12-16 RX ADMIN — Medication 12.5 MG: at 11:21

## 2024-12-16 RX ADMIN — PREDNISONE 5 MG: 5 TABLET ORAL at 11:23

## 2024-12-16 RX ADMIN — CHOLESTYRAMINE 4 G: 4 POWDER, FOR SUSPENSION ORAL at 11:19

## 2024-12-16 RX ADMIN — OXYCODONE 10 MG: 5 TABLET ORAL at 20:57

## 2024-12-16 RX ADMIN — SILDENAFIL 10 MG: 20 TABLET ORAL at 17:02

## 2024-12-16 RX ADMIN — SILDENAFIL 10 MG: 20 TABLET ORAL at 20:56

## 2024-12-16 RX ADMIN — OXYCODONE 10 MG: 5 TABLET ORAL at 11:19

## 2024-12-16 RX ADMIN — SILDENAFIL 10 MG: 20 TABLET ORAL at 11:23

## 2024-12-16 RX ADMIN — LEVOTHYROXINE SODIUM 50 MCG: 50 TABLET ORAL at 05:34

## 2024-12-16 RX ADMIN — MIDODRINE HYDROCHLORIDE 15 MG: 5 TABLET ORAL at 11:20

## 2024-12-16 RX ADMIN — MIRTAZAPINE 15 MG: 15 TABLET, FILM COATED ORAL at 20:57

## 2024-12-16 RX ADMIN — EPOETIN ALFA-EPBX 20000 UNITS: 20000 INJECTION, SOLUTION INTRAVENOUS; SUBCUTANEOUS at 12:40

## 2024-12-16 RX ADMIN — DULOXETINE HYDROCHLORIDE 20 MG: 20 CAPSULE, DELAYED RELEASE ORAL at 11:24

## 2024-12-16 NOTE — SIGNIFICANT NOTE
12/16/24 0922   OTHER   Discipline physical therapist   Rehab Time/Intention   Session Not Performed patient unavailable for treatment  (off the floor for HD. May return to SNF this afternoon.)   Therapy Assessment/Plan (PT)   Criteria for Skilled Interventions Met (PT) meets criteria   Recommendation   PT - Next Appointment 12/17/24

## 2024-12-16 NOTE — PROGRESS NOTES
Nutrition Services  Patient Name: Jaja Carcamo  YOB: 1945  MRN: [id number]  Admission date: 11/29/2024    --Continue magic cup    --Order re-weight     CLINICAL NUTRITION ASSESSMENT      Reason for Assessment Follow up protocol   12/02: MST=3   H&P      Past Medical History:   Diagnosis Date    Allergic rhinitis 04/14/2015    Asthma 08/10/2017    Atrial flutter 05/11/2017    Chronic diarrhea 04/15/2019    Chronic hypoxemic respiratory failure 01/18/2024    Chronic pain 02/03/2015    COPD (chronic obstructive pulmonary disease)     COVID-19 virus detected 08/11/2022    Cytokine release syndrome, grade 1 08/16/2022    Edema of both lower extremities due to peripheral venous insufficiency 03/12/2021    ESRD on hemodialysis 05/22/2013    Essential (primary) hypertension 03/03/2022    Fracture of ulnar styloid 05/19/2022    Fracture of unspecified carpal bone, right wrist, subsequent encounter for fracture with routine healing 03/03/2022    Gastroesophageal reflux disease 11/12/2020    Gout 08/10/2017    Hearing loss 08/10/2017    History of appendectomy     History of DVT (deep vein thrombosis) 11/12/2020    History of kidney transplant 06/30/2017    History of lobectomy of lung 01/18/2024 03/08/2016:  RIGHT Lower Lobe Mass--> Right Video-assisted thoracoscopy with a moderate-to-large wedge resection of the RLL (by Dr. Haris Mcconnell @ [facility])--> Poorly differentiated carcinoma of the RLL.      History of repair of hip joint 05/21/2013    History of suicide attempt 05/20/2024    Hyperlipidemia 08/11/2014    Hypothyroidism, unspecified 03/03/2022    Infection due to extended spectrum beta lactamase (ESBL) producing bacteria 03/11/2016    No A2K system hx. +ESBL E coli urine on 3/11/16.      Irritable bowel syndrome 04/15/2019    Long term (current) use of immunosuppressive biologic 04/28/2021    Long term current use of anticoagulant therapy 01/18/2024    Malignant neoplasm of lung 08/10/2017     Menopausal flushing 08/30/2021    Mitral valve regurgitation 07/06/2015    Mixed anxiety and depressive disorder 04/30/2015    Non-small cell lung cancer 08/10/2017    Nonobstructive atherosclerosis of coronary artery 06/02/2019 08/12/2022: CATH: [facility]: NSTEMI assoc with Covid-19: LM:-nl;  LAD: diffuse, Ca++; 30%; CIRC: Dominant. Normal; RCA: small; 50% diffuse, proximal and mid-segment.      NSTEMI (non-ST elevated myocardial infarction) 08/11/2022    Obsessive-compulsive disorder 04/15/2019    Osteoarthritis 05/20/2024    Paroxysmal atrial fibrillation 05/11/2017    Paroxysmal supraventricular tachycardia 05/11/2017    Peripheral neuropathy 08/11/2014    Personal history of peptic ulcer disease 11/12/2020    Postoperative anemia due to acute blood loss 05/22/2013    Right wrist fracture 03/01/2022    Seasonal allergies 08/10/2017    Secondary hyperparathyroidism of renal origin 09/14/2015    Tricuspid valve regurgitation 03/15/2017    Ulcer of lower extremity 08/30/2021    Unspecified acute appendicitis 03/03/2022    Valvular heart disease 05/20/2024    Wegener's granulomatosis with renal involvement 03/03/2022       Past Surgical History:   Procedure Laterality Date    APPENDECTOMY      ARTERIOVENOUS FISTULA/SHUNT SURGERY Right 12/3/2024    Procedure: FISTULOGRAM  and angioplasty RIGHT ARM;  Surgeon: Paulino Alva II, MD;  Location: [facility] HYBRID OR;  Service: Vascular;  Laterality: Right;    BREAST SURGERY Left     cysts rmeoved    CARDIAC CATHETERIZATION Right 08/12/2022    Procedure: Left Heart Cath and coronary angiogram;  Surgeon: Jak Gavin MD;  Location: [facility] CATH INVASIVE LOCATION;  Service: Cardiology;  Laterality: Right;    CLOSED REDUCTION WRIST FRACTURE Right 03/01/2022    Procedure: WRIST CLOSED REDUCTION;  Surgeon: Gaudencio Townsend MD;  Location: [facility] MAIN OR;  Service: Orthopedics;  Laterality: Right;    CYSTOSCOPY      HIP ARTHROPLASTY      HYSTERECTOMY      LUNG LOBECTOMY Right   "   TRANSPLANTATION RENAL          Current Problems   R RUE DVT   - vascular surgery following   - on eliquis PTA  - pt also with high-grade stenosis of R cephalic vein and subclavian vein; vascular service following     TONYA   - receiving HD   - nephrology following   - of note, has Hx renal transplant     Atrial flutter with RVR   - medications in place     COPD   - stable        Encounter Information        Trending Narrative     12/16: At my visit pt reports that she is eating fairly well. Pt has not had a BM in several days, discussed PRN stool softeners that are ordered. Intake appears lower today compared to other days per RN charting.     12/9: Pt assessed for follow up. Since prior assessment, pt has needed to begin acute dialysis and continues on this -- nephrology is following daily. Pt's appetite remains very poor but with some improvement in intake overall; usually eating about \"half\" at meals (noted also ~75% documented at one recent meal). Documented weights much higher than historical weight - may include fluid (RD noted significant edema during assessment); will continue to monitor.    12/02: Pt was admitted with right arm pain and swelling. Patient has a right arm fistula that has not been used in 8 years. Planning for fistulagram.     Pt was sitting up and eating breakfast at my visit. Pt reports that her appetite has been poor over several days. Per chart review, pt's weight has remained stable. Pt declines nutrition supplements. Pt did have some moderate muscle/fat wasting but there is not enough evidence to support a malnutrition diagnosis at this time. Offered to obtain food preferences but pt declined.      Anthropometrics        Current Height, Weight Height: 167.6 cm (66\")  Weight: 85.7 kg (188 lb 15 oz) (12/09/24 0644)       Usual Body Weight (UBW) Unknown        Trending Weight Hx     This admission: 12/16: no updated weight, will order   12/9: 5% gain since prior assessment -- fluid " related; pt documented +4.9 L  12/02: 180# (obtained 12/01)              PTA: 12/02: 1% increase over 7 months    Wt Readings from Last 30 Encounters:   12/09/24 0644 85.7 kg (188 lb 15 oz)   12/03/24 0927 81.6 kg (180 lb)   12/01/24 0330 81.8 kg (180 lb 5.4 oz)   11/29/24 1715 81 kg (178 lb 9.2 oz)   11/29/24 1238 74.5 kg (164 lb 3.9 oz)   06/13/24 0452 74.4 kg (164 lb 0.4 oz)   06/11/24 0600 67.5 kg (148 lb 13 oz)   06/08/24 0400 62.5 kg (137 lb 12.6 oz)   06/06/24 0447 64.4 kg (141 lb 15.6 oz)   06/05/24 1152 71.3 kg (157 lb 3 oz)   06/03/24 0501 71.3 kg (157 lb 3 oz)   06/01/24 0644 72.2 kg (159 lb 2.8 oz)   05/29/24 0445 84.7 kg (186 lb 11.7 oz)   05/28/24 0444 84.2 kg (185 lb 10 oz)   05/27/24 0526 84.9 kg (187 lb 2.7 oz)   05/26/24 0406 84.6 kg (186 lb 8.2 oz)   05/25/24 1100 84.6 kg (186 lb 8.2 oz)   05/25/24 0438 85.1 kg (187 lb 9.8 oz)   05/24/24 1143 86.2 kg (190 lb)   05/22/24 0314 89.3 kg (196 lb 13.9 oz)   05/21/24 0430 88.6 kg (195 lb 5.2 oz)   05/20/24 1503 92.1 kg (203 lb)   05/20/24 0413 92.3 kg (203 lb 7.8 oz)   05/19/24 0500 89.9 kg (198 lb 3.1 oz)   05/18/24 1100 88.9 kg (195 lb 15.8 oz)   05/18/24 0308 92.9 kg (204 lb 12.9 oz)   05/17/24 2150 80.7 kg (178 lb)   08/16/22 0535 84.7 kg (186 lb 11.7 oz)   08/14/22 0433 81.3 kg (179 lb 3.7 oz)   08/12/22 0500 81.4 kg (179 lb 7.3 oz)   08/11/22 2154 81.4 kg (179 lb 7.3 oz)   08/11/22 1301 81.6 kg (180 lb)   03/01/22 0804 83 kg (182 lb 15.7 oz)   03/01/22 0527 83 kg (182 lb 15.7 oz)   02/28/22 2241 81 kg (178 lb 9.2 oz)   10/14/20 1055 (P) 85.3 kg (188 lb)      BMI kg/m2 Body mass index is 30.49 kg/m².       Labs        Pertinent Labs    Results from last 7 days   Lab Units 12/16/24  0156 12/15/24  0426 12/14/24  0252   SODIUM mmol/L 132* 131* 132*   POTASSIUM mmol/L 4.6 4.3 4.2   CHLORIDE mmol/L 98 98 99   CO2 mmol/L 26.2 24.9 25.3   BUN mg/dL 39* 33* 25*   CREATININE mg/dL 2.80* 2.74* 2.34*   CALCIUM mg/dL 9.0 8.8 8.4*   GLUCOSE mg/dL 116* 100* 97  "    Results from last 7 days   Lab Units 12/16/24  0156   PHOSPHORUS mg/dL 5.2*   HEMOGLOBIN g/dL 8.1*   HEMATOCRIT % 27.6*     No results found for: \"HGBA1C\"     Medications    Scheduled Medications apixaban, 5 mg, Oral, BID  atorvastatin, 10 mg, Oral, Daily  cholestyramine light, 1 packet, Oral, Q24H  DULoxetine, 20 mg, Oral, Daily  epoetin rylee/rylee-epbx, 20,000 Units, Subcutaneous, Once per day on Monday Wednesday Friday  famotidine, 20 mg, Oral, Every Other Day  hydrOXYzine, 25 mg, Oral, Nightly  levothyroxine, 50 mcg, Oral, Q AM  lidocaine, 1 Application, Topical, Once per day on Monday Wednesday Friday  metoprolol tartrate, 12.5 mg, Oral, Q12H  midodrine, 15 mg, Oral, TID AC  mirtazapine, 15 mg, Oral, Nightly  nystatin, , Topical, Q12H  predniSONE, 5 mg, Oral, Daily With Breakfast  QUEtiapine, 25 mg, Oral, Nightly  sildenafil, 10 mg, Oral, TID  sodium chloride, 10 mL, Intravenous, Q12H  sodium chloride, 10 mL, Intravenous, Q12H  sodium chloride, 10 mL, Intravenous, Q12H  tacrolimus, 0.5 mg, Oral, Q12H        Infusions        PRN Medications   acetaminophen    senna-docusate sodium **AND** polyethylene glycol **AND** bisacodyl **AND** bisacodyl    influenza vaccine    metoprolol tartrate    ondansetron    oxyCODONE    prochlorperazine    [COMPLETED] Insert Peripheral IV **AND** sodium chloride    sodium chloride    sodium chloride    sodium chloride    sodium chloride     Physical Findings        Trending Physical   Appearance, NFPE 12/16: I agree with the below     12/9: NFPE completed and not consistent with nutrition diagnosis of malnutrition at this time using AND/ASPEN criteria -- but -- edema may be masking wasting at some sites. Will continue to follow up.    12/02: NFPE completed and not consistent with nutrition diagnosis of malnutrition at this time using AND/ASPEN criteria     --  Edema  2+ generalized, knees, R arm  3+ legs, ankles, feet      Bowel Function +BM 12/9 - discussed PRN stool softeners " ordered      Tubes No tubes      Chewing/Swallowing No issues reported     Skin --small partial thickness wound to her right lateral lower leg  --full thickness wound to her right knee   --non-blanchable erythema to sacrum  (See WOCN note)    --  Current Nutrition Orders & Evaluation of Intake       Oral Nutrition     Food Allergies NKFA    Current PO Diet Diet: Regular/House, Renal; Low Potassium; Fluid Consistency: Thin (IDDSI 0)   Supplement Magic Cups at lunch/dinner (Provides 580 kcals, 18 g protein if consumed)      PO Evaluation     Trending % PO Intake 12/16: 0-25% today   12/9: 75% at recent meals; at least 50% at most recent -- improved since prior assessment   12/02: 0% documented - pt had consumed ~25% at my visit    --  Nutritional Risk Screening        NRS-2002 Score          Nutrition Diagnosis         Nutrition Dx Problem 1 Inadequate oral intake related to decreased appetite as evidenced by consuming less than 75% of meals       Nutrition Dx Problem 2        Intervention Goal         Intervention Goal(s) To consume at least 50% of meals      Nutrition Intervention        RD Action Continue ONS, order re-weigh      Nutrition Prescription          Diet Prescription Low potassium    Supplement Prescription Magic Cups at lunch/dinner (Provides 580 kcals, 18 g protein if consumed)      --  Monitor/Evaluation        Monitor PO intake, Supplement intake, Weight, POC/GOC     Electronically signed by:  Lindy Deleon RD  12/16/24 14:34 EST

## 2024-12-16 NOTE — SIGNIFICANT NOTE
12/16/24 1034   Rehab Time/Intention   Session Not Performed patient unavailable for treatment  (Dialysis)   Recommendation   OT - Next Appointment 12/17/24

## 2024-12-16 NOTE — PLAN OF CARE
Goal Outcome Evaluation:  Plan of Care Reviewed With: patient        Progress: no change  Outcome Evaluation: remains forgetful. HGB stable. Dialysis completed this AM with 2 L removed. Awaiting chair time at outside dialysis before can be discharged

## 2024-12-16 NOTE — PROGRESS NOTES
Daily Progress Note          Assessment    Hypoxic respiratory insufficiency multifactorial   negative respiratory panel      right IJ DVT, brachial artery aneurysmal dilation/AV fistula  No PE on CAT scan  Pulm edema with bilateral pleural effusion left > right  A-fib with RVR was on Eliquis as an outpatient     Pulmonary hypertension  2D echo 10/4/2024  EF 58 % (Biplane Amato's method).   Severe biatrial enlargement.  moderate mitral regurgitation. moderate tricuspid regurgitation.   Calculated pulmonary systolic pressure of 72 mmHg.   Dilated inferior vena cava consistent with a mean right atrial pressure of 15  mmHg.      ? 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 difficile 6/7/2024  History of ESBL E. coli and 6/5/2024  Hypothyroidism  CAD  History of DVT     Pulmonary hypertension  HTN        Recommendations:     oxygen supplement and titration to maintain saturation 90-95%: Currently on 6 L per nasal cannula     Cont Low-dose Revatio 10 mg 3 times daily   midodrine 15 mg 3 times daily   continue to monitor blood pressure   No nitrates     Diuresis by nephrology  Immunosuppressive agents Prograf and prednisone 5 mg for renal transplant      Anticoagulation: Eliquis     antibiotics completed IV Zosyn     Avoid sedatives            Chest x-ray 12/1/2024                    LOS: 16 days     Subjective     Patient reports chronic shortness of breath    Objective     Vital signs for last 24 hours:  Vitals:    12/16/24 0705 12/16/24 0710 12/16/24 0730 12/16/24 0800   BP: 131/80 122/74 133/70 125/69   BP Location:       Patient Position:       Pulse: 100 94 98 99   Resp: 13 11 13 11   Temp:       TempSrc:       SpO2:       Weight:       Height:           Intake/Output last 3 shifts:  I/O last 3 completed shifts:  In: 1440 [P.O.:1440]  Out: 0   Intake/Output this shift:  No intake/output data recorded.      Radiology  Imaging Results (Last  24 Hours)       ** No results found for the last 24 hours. **            Labs:  Results from last 7 days   Lab Units 12/16/24  0156   WBC 10*3/mm3 10.60   HEMOGLOBIN g/dL 8.1*   HEMATOCRIT % 27.6*   PLATELETS 10*3/mm3 130*     Results from last 7 days   Lab Units 12/16/24  0156   SODIUM mmol/L 132*   POTASSIUM mmol/L 4.6   CHLORIDE mmol/L 98   CO2 mmol/L 26.2   BUN mg/dL 39*   CREATININE mg/dL 2.80*   CALCIUM mg/dL 9.0   GLUCOSE mg/dL 116*           Results from last 7 days   Lab Units 12/16/24  0156 12/15/24  0426 12/14/24  0252   ALBUMIN g/dL 3.0* 3.1* 2.8*                   Results from last 7 days   Lab Units 12/09/24  1018   APTT seconds 62.0*               Meds:   SCHEDULE  apixaban, 5 mg, Oral, BID  atorvastatin, 10 mg, Oral, Daily  cholestyramine light, 1 packet, Oral, Q24H  DULoxetine, 20 mg, Oral, Daily  epoetin rylee/rylee-epbx, 20,000 Units, Subcutaneous, Once per day on Monday Wednesday Friday  famotidine, 20 mg, Oral, Every Other Day  hydrOXYzine, 25 mg, Oral, Nightly  levothyroxine, 50 mcg, Oral, Q AM  lidocaine, 1 Application, Topical, Once per day on Monday Wednesday Friday  metoprolol tartrate, 12.5 mg, Oral, Q12H  midodrine, 15 mg, Oral, TID AC  mirtazapine, 15 mg, Oral, Nightly  nystatin, , Topical, Q12H  predniSONE, 5 mg, Oral, Daily With Breakfast  QUEtiapine, 25 mg, Oral, Nightly  sildenafil, 10 mg, Oral, TID  sodium chloride, 10 mL, Intravenous, Q12H  sodium chloride, 10 mL, Intravenous, Q12H  sodium chloride, 10 mL, Intravenous, Q12H  tacrolimus, 0.5 mg, Oral, Daily      Infusions     PRNs    acetaminophen    senna-docusate sodium **AND** polyethylene glycol **AND** bisacodyl **AND** bisacodyl    influenza vaccine    metoprolol tartrate    ondansetron    oxyCODONE    prochlorperazine    [COMPLETED] Insert Peripheral IV **AND** sodium chloride    sodium chloride    sodium chloride    sodium chloride    sodium chloride    Physical Exam:  General Appearance:  Alert: Looks chronically ill but not  in acute distress  HEENT:  Normocephalic, without obvious abnormality, Conjunctiva/corneas clear,.   Nares normal, no drainage     Neck:  Supple, symmetrical, trachea midline.   Lungs /Chest wall:   Mild bilateral basal rhonchi, respirations unlabored, symmetrical wall movement.     Heart:  Regular rate and rhythm, S1 S2 normal  Abdomen: Soft, non-tender, no masses, no organomegaly.    Extremities: No edema, no clubbing or cyanosis     ROS  Constitutional: Negative for chills, fever and malaise/fatigue.   HENT: Negative.    Eyes: Negative.    Cardiovascular: Negative.    Respiratory: Positive for mild cough and shortness of breath.    Skin: Negative.    Musculoskeletal: Negative.    Gastrointestinal: Negative.    Genitourinary: Negative.    Neurological: Generalized weakness and memory difficulties      I reviewed the recent clinical results  I personally reviewed the latest radiological studies    Part of this note may be an electronic transcription/translation of spoken language to printed text using the Dragon Dictation System.

## 2024-12-16 NOTE — PROGRESS NOTES
"RENAL/KCC:     LOS: 16 days    Patient Care Team:  Kvng Guillen MD as PCP - General (Family Medicine)  Jak Gavin MD as Cardiologist (Cardiology)    Chief Complaint:  TONYA/CKD, Kidney transplant    Subjective     Interval History:   Chart reviewed  No new complaints  S/P HD today    Objective     Vital Sign Min/Max for last 24 hours  Temp  Min: 97.5 °F (36.4 °C)  Max: 98.3 °F (36.8 °C)   BP  Min: 100/63  Max: 133/74   Pulse  Min: 94  Max: 113   Resp  Min: 10  Max: 15   SpO2  Min: 82 %  Max: 95 %   Flow (L/min) (Oxygen Therapy)  Min: 5  Max: 6   No data recorded     Flowsheet Rows      Flowsheet Row First Filed Value   Admission Height 167.6 cm (66\") Documented at 11/29/2024 1238   Admission Weight 74.5 kg (164 lb 3.9 oz) Documented at 11/29/2024 1238            I/O this shift:  In: 480 [P.O.:480]  Out: 2000   I/O last 3 completed shifts:  In: 1440 [P.O.:1440]  Out: 0     Physical Exam:  GEN: Awake, NAD  ENT: PERRL, EOMI, MMM  NECK: Supple, no JVD  CHEST: CTAB, no W/R/C  CV: RRR, no M/G/R, +edema  ABD: Soft, NT, +BS  SKIN: Warm and Dry  NEURO: CN's intact      WBC WBC   Date Value Ref Range Status   12/16/2024 10.60 3.40 - 10.80 10*3/mm3 Final   12/15/2024 10.08 3.40 - 10.80 10*3/mm3 Final   12/14/2024 10.02 3.40 - 10.80 10*3/mm3 Final        HGB Hemoglobin   Date Value Ref Range Status   12/16/2024 8.1 (L) 12.0 - 15.9 g/dL Final   12/15/2024 8.1 (L) 12.0 - 15.9 g/dL Final   12/14/2024 8.4 (L) 12.0 - 15.9 g/dL Final   12/14/2024 6.6 (C) 12.0 - 15.9 g/dL Final        HCT Hematocrit   Date Value Ref Range Status   12/16/2024 27.6 (L) 34.0 - 46.6 % Final   12/15/2024 28.0 (L) 34.0 - 46.6 % Final   12/14/2024 28.3 (L) 34.0 - 46.6 % Final   12/14/2024 22.6 (L) 34.0 - 46.6 % Final        Platlets No results found for: \"LABPLAT\"   MCV MCV   Date Value Ref Range Status   12/16/2024 96.8 79.0 - 97.0 fL Final   12/15/2024 95.9 79.0 - 97.0 fL Final   12/14/2024 95.4 79.0 - 97.0 fL Final            Sodium Sodium   Date " "Value Ref Range Status   12/16/2024 132 (L) 136 - 145 mmol/L Final   12/15/2024 131 (L) 136 - 145 mmol/L Final   12/14/2024 132 (L) 136 - 145 mmol/L Final      Potassium Potassium   Date Value Ref Range Status   12/16/2024 4.6 3.5 - 5.2 mmol/L Final   12/15/2024 4.3 3.5 - 5.2 mmol/L Final   12/14/2024 4.2 3.5 - 5.2 mmol/L Final      Chloride Chloride   Date Value Ref Range Status   12/16/2024 98 98 - 107 mmol/L Final   12/15/2024 98 98 - 107 mmol/L Final   12/14/2024 99 98 - 107 mmol/L Final      CO2 CO2   Date Value Ref Range Status   12/16/2024 26.2 22.0 - 29.0 mmol/L Final   12/15/2024 24.9 22.0 - 29.0 mmol/L Final   12/14/2024 25.3 22.0 - 29.0 mmol/L Final      BUN BUN   Date Value Ref Range Status   12/16/2024 39 (H) 8 - 23 mg/dL Final   12/15/2024 33 (H) 8 - 23 mg/dL Final   12/14/2024 25 (H) 8 - 23 mg/dL Final      Creatinine Creatinine   Date Value Ref Range Status   12/16/2024 2.80 (H) 0.57 - 1.00 mg/dL Final   12/15/2024 2.74 (H) 0.57 - 1.00 mg/dL Final   12/14/2024 2.34 (H) 0.57 - 1.00 mg/dL Final      Calcium Calcium   Date Value Ref Range Status   12/16/2024 9.0 8.6 - 10.5 mg/dL Final   12/15/2024 8.8 8.6 - 10.5 mg/dL Final   12/14/2024 8.4 (L) 8.6 - 10.5 mg/dL Final      PO4 No results found for: \"CAPO4\"   Albumin Albumin   Date Value Ref Range Status   12/16/2024 3.0 (L) 3.5 - 5.2 g/dL Final   12/15/2024 3.1 (L) 3.5 - 5.2 g/dL Final   12/14/2024 2.8 (L) 3.5 - 5.2 g/dL Final      Magnesium No results found for: \"MG\"     Uric Acid No results found for: \"URICACID\"        Results Review:     I reviewed the patient's new clinical results.    apixaban, 5 mg, Oral, BID  atorvastatin, 10 mg, Oral, Daily  cholestyramine light, 1 packet, Oral, Q24H  DULoxetine, 20 mg, Oral, Daily  epoetin rylee/rylee-epbx, 20,000 Units, Subcutaneous, Once per day on Monday Wednesday Friday  famotidine, 20 mg, Oral, Every Other Day  hydrOXYzine, 25 mg, Oral, Nightly  levothyroxine, 50 mcg, Oral, Q AM  lidocaine, 1 Application, " Topical, Once per day on Monday Wednesday Friday  metoprolol tartrate, 12.5 mg, Oral, Q12H  midodrine, 15 mg, Oral, TID AC  mirtazapine, 15 mg, Oral, Nightly  nystatin, , Topical, Q12H  predniSONE, 5 mg, Oral, Daily With Breakfast  QUEtiapine, 25 mg, Oral, Nightly  sildenafil, 10 mg, Oral, TID  sodium chloride, 10 mL, Intravenous, Q12H  sodium chloride, 10 mL, Intravenous, Q12H  sodium chloride, 10 mL, Intravenous, Q12H  tacrolimus, 0.5 mg, Oral, Q12H        Medication Review: Reviewed    Assessment & Plan     1) Kidney Transplant - On Prograf  2) TONYA - HARRY/ATN/CNI toxicity, Baseline Cr under 1.  3) DVT  4) Hypotension  5) Anemia of CKD  6) AMS    Plan: ATN now requiring acute dialysis MWF.  Cr still rising in between HD.  Midodrine for BP support.  Transplant US with no hydro and no arterial or venous stenosis.  Prograf level was 3.4 on 11/30 and jumped to 15.2 on 12/3 and then 17.6 on 12/7. Decreased Prograf to 0.5 mg daily and repeat level is down to 9.1 with repeat level from 12/14 pending.  Can change to 0.5 mg BID for now.  On AC.  Repleted iron.  Epo ordered. Transfuse prn.  ABX per primary.  Pulm following as well.      Outpatient HD is arranged      Naun Falcon MD  [facility]  12/16/24  14:14 EST

## 2024-12-16 NOTE — PLAN OF CARE
Problem: Adult Inpatient Plan of Care  Goal: Plan of Care Review  Outcome: Progressing   Goal Outcome Evaluation:        Pt rested well overnight, no c/o pain. Pt to have HD in AM. Pt educated on current plan of care, verbalized understanding.

## 2024-12-17 LAB
ALBUMIN SERPL-MCNC: 2.8 G/DL (ref 3.5–5.2)
ANION GAP SERPL CALCULATED.3IONS-SCNC: 8.6 MMOL/L (ref 5–15)
BASOPHILS # BLD AUTO: 0.04 10*3/MM3 (ref 0–0.2)
BASOPHILS NFR BLD AUTO: 0.4 % (ref 0–1.5)
BUN SERPL-MCNC: 29 MG/DL (ref 8–23)
BUN/CREAT SERPL: 13.7 (ref 7–25)
CALCIUM SPEC-SCNC: 8.7 MG/DL (ref 8.6–10.5)
CHLORIDE SERPL-SCNC: 98 MMOL/L (ref 98–107)
CO2 SERPL-SCNC: 26.4 MMOL/L (ref 22–29)
CREAT SERPL-MCNC: 2.12 MG/DL (ref 0.57–1)
DEPRECATED RDW RBC AUTO: 89 FL (ref 37–54)
EGFRCR SERPLBLD CKD-EPI 2021: 23.6 ML/MIN/1.73
EOSINOPHIL # BLD AUTO: 0.02 10*3/MM3 (ref 0–0.4)
EOSINOPHIL NFR BLD AUTO: 0.2 % (ref 0.3–6.2)
ERYTHROCYTE [DISTWIDTH] IN BLOOD BY AUTOMATED COUNT: 26.5 % (ref 12.3–15.4)
GLUCOSE SERPL-MCNC: 95 MG/DL (ref 65–99)
HCT VFR BLD AUTO: 27.1 % (ref 34–46.6)
HGB BLD-MCNC: 7.9 G/DL (ref 12–15.9)
IMM GRANULOCYTES # BLD AUTO: 0.05 10*3/MM3 (ref 0–0.05)
IMM GRANULOCYTES NFR BLD AUTO: 0.5 % (ref 0–0.5)
LYMPHOCYTES # BLD AUTO: 1.61 10*3/MM3 (ref 0.7–3.1)
LYMPHOCYTES NFR BLD AUTO: 14.5 % (ref 19.6–45.3)
MCH RBC QN AUTO: 28.6 PG (ref 26.6–33)
MCHC RBC AUTO-ENTMCNC: 29.2 G/DL (ref 31.5–35.7)
MCV RBC AUTO: 98.2 FL (ref 79–97)
MONOCYTES # BLD AUTO: 1.15 10*3/MM3 (ref 0.1–0.9)
MONOCYTES NFR BLD AUTO: 10.4 % (ref 5–12)
NEUTROPHILS NFR BLD AUTO: 74 % (ref 42.7–76)
NEUTROPHILS NFR BLD AUTO: 8.22 10*3/MM3 (ref 1.7–7)
NRBC BLD AUTO-RTO: 0.4 /100 WBC (ref 0–0.2)
PHOSPHATE SERPL-MCNC: 3.8 MG/DL (ref 2.5–4.5)
PLATELET # BLD AUTO: 128 10*3/MM3 (ref 140–450)
PMV BLD AUTO: 10.2 FL (ref 6–12)
POTASSIUM SERPL-SCNC: 4.2 MMOL/L (ref 3.5–5.2)
RBC # BLD AUTO: 2.76 10*6/MM3 (ref 3.77–5.28)
SODIUM SERPL-SCNC: 133 MMOL/L (ref 136–145)
TACROLIMUS BLD LC/MS/MS-MCNC: 3.7 NG/ML (ref 2–20)
WBC NRBC COR # BLD AUTO: 11.09 10*3/MM3 (ref 3.4–10.8)

## 2024-12-17 PROCEDURE — 97530 THERAPEUTIC ACTIVITIES: CPT

## 2024-12-17 PROCEDURE — 80197 ASSAY OF TACROLIMUS: CPT | Performed by: INTERNAL MEDICINE

## 2024-12-17 PROCEDURE — 85025 COMPLETE CBC W/AUTO DIFF WBC: CPT | Performed by: INTERNAL MEDICINE

## 2024-12-17 PROCEDURE — 63710000001 PREDNISONE PER 5 MG: Performed by: STUDENT IN AN ORGANIZED HEALTH CARE EDUCATION/TRAINING PROGRAM

## 2024-12-17 PROCEDURE — 97112 NEUROMUSCULAR REEDUCATION: CPT

## 2024-12-17 PROCEDURE — 63710000001 TACROLIMUS PER 1 MG: Performed by: INTERNAL MEDICINE

## 2024-12-17 PROCEDURE — 97535 SELF CARE MNGMENT TRAINING: CPT

## 2024-12-17 PROCEDURE — 80069 RENAL FUNCTION PANEL: CPT | Performed by: STUDENT IN AN ORGANIZED HEALTH CARE EDUCATION/TRAINING PROGRAM

## 2024-12-17 RX ADMIN — MIRTAZAPINE 15 MG: 15 TABLET, FILM COATED ORAL at 21:12

## 2024-12-17 RX ADMIN — Medication 12.5 MG: at 21:12

## 2024-12-17 RX ADMIN — NYSTATIN: 100000 POWDER TOPICAL at 08:33

## 2024-12-17 RX ADMIN — MIDODRINE HYDROCHLORIDE 15 MG: 5 TABLET ORAL at 16:43

## 2024-12-17 RX ADMIN — MIDODRINE HYDROCHLORIDE 15 MG: 5 TABLET ORAL at 10:37

## 2024-12-17 RX ADMIN — DULOXETINE HYDROCHLORIDE 20 MG: 20 CAPSULE, DELAYED RELEASE ORAL at 08:34

## 2024-12-17 RX ADMIN — Medication 10 ML: at 21:16

## 2024-12-17 RX ADMIN — OXYCODONE 10 MG: 5 TABLET ORAL at 10:45

## 2024-12-17 RX ADMIN — ATORVASTATIN CALCIUM 10 MG: 10 TABLET ORAL at 08:33

## 2024-12-17 RX ADMIN — HYDROXYZINE HYDROCHLORIDE 25 MG: 25 TABLET ORAL at 21:12

## 2024-12-17 RX ADMIN — SILDENAFIL 10 MG: 20 TABLET ORAL at 16:44

## 2024-12-17 RX ADMIN — LEVOTHYROXINE SODIUM 50 MCG: 50 TABLET ORAL at 03:13

## 2024-12-17 RX ADMIN — NYSTATIN: 100000 POWDER TOPICAL at 21:12

## 2024-12-17 RX ADMIN — OXYCODONE 10 MG: 5 TABLET ORAL at 03:13

## 2024-12-17 RX ADMIN — TACROLIMUS 0.5 MG: 0.5 CAPSULE ORAL at 08:33

## 2024-12-17 RX ADMIN — Medication 12.5 MG: at 08:34

## 2024-12-17 RX ADMIN — APIXABAN 5 MG: 5 TABLET, FILM COATED ORAL at 21:12

## 2024-12-17 RX ADMIN — CHOLESTYRAMINE 4 G: 4 POWDER, FOR SUSPENSION ORAL at 10:37

## 2024-12-17 RX ADMIN — Medication 10 ML: at 21:15

## 2024-12-17 RX ADMIN — Medication 10 ML: at 08:33

## 2024-12-17 RX ADMIN — TACROLIMUS 0.5 MG: 0.5 CAPSULE ORAL at 21:12

## 2024-12-17 RX ADMIN — SILDENAFIL 10 MG: 20 TABLET ORAL at 21:12

## 2024-12-17 RX ADMIN — Medication 10 ML: at 08:32

## 2024-12-17 RX ADMIN — APIXABAN 5 MG: 5 TABLET, FILM COATED ORAL at 08:34

## 2024-12-17 RX ADMIN — QUETIAPINE FUMARATE 25 MG: 25 TABLET ORAL at 21:15

## 2024-12-17 RX ADMIN — OXYCODONE 10 MG: 5 TABLET ORAL at 21:12

## 2024-12-17 RX ADMIN — PREDNISONE 5 MG: 5 TABLET ORAL at 08:35

## 2024-12-17 RX ADMIN — MIDODRINE HYDROCHLORIDE 15 MG: 5 TABLET ORAL at 08:31

## 2024-12-17 RX ADMIN — SILDENAFIL 10 MG: 20 TABLET ORAL at 08:35

## 2024-12-17 NOTE — PROGRESS NOTES
[facility] MEDICINE SERVICE  DAILY PROGRESS NOTE    NAME: Jaja Carcamo  : 1945  MRN: [id number]      LOS: 16 days     PROVIDER OF SERVICE: Елена Russell MD    Chief Complaint: Acute deep vein thrombosis (DVT) of other vein of right upper extremity    Subjective:     Interval History:      Went for HD this morning, denies any new complaints. Asking about discharge planning, but outpatient HD set up is pending. Also, she is o 6LPM oxygen, will need pulmonary and renal clearance.  Objective:     Vital Signs  Temp:  [97.5 °F (36.4 °C)-97.7 °F (36.5 °C)] 97.5 °F (36.4 °C)  Heart Rate:  [] 99  Resp:  [10-15] 14  BP: (100-133)/(45-80) 109/70  Flow (L/min) (Oxygen Therapy):  [6] 6   Body mass index is 29.11 kg/m².    Physical Exam    General: Alert and oriented, on oxygen via nasal cannula 6LPM.   HENT: Normocephalic, moist oral mucosa, no scleral icterus.  Neck: Supple, nontender, no carotid bruits, no JVD, no LAD.  Lungs: Clear to auscultation, nonlabored respiration.  Heart: RRR, no murmur, gallop or edema.  Abdomen: Soft, nontender, nondistended, + bowel sounds.  Musculoskeletal: Bilateral LE foot dependent edema  Neuro: alert and awake, moving all 4 extremities    Extremities:RUE nonpitting edema with nonfunctioning AV fistula, enlargement of right upper arm veins      Diagnostic Data    Results from last 7 days   Lab Units 24  0156   WBC 10*3/mm3 10.60   HEMOGLOBIN g/dL 8.1*   HEMATOCRIT % 27.6*   PLATELETS 10*3/mm3 130*   GLUCOSE mg/dL 116*   CREATININE mg/dL 2.80*   BUN mg/dL 39*   SODIUM mmol/L 132*   POTASSIUM mmol/L 4.6   ANION GAP mmol/L 7.8       No radiology results for the last day      I reviewed the patient's new clinical results.    Assessment/Plan:     Active and Resolved Problems  Active Hospital Problems    Diagnosis  POA    **Acute deep vein thrombosis (DVT) of other vein of right upper extremity [I82.621]  Yes    Acute DVT (deep venous thrombosis) [I82.409]  Yes       Resolved Hospital Problems   No resolved problems to display.      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 as well as high-grade venous stenosis  -Patient was already on Eliquis  -Vascular surgery consulted; patient underwent balloon angioplasty of right cephalic vein and subclavian vein    TONYA  -Likely contrast-induced nephropathy with ATN  -Renal function worse despite IV fluid resuscitation and patient now appears to be volume overloaded  -Initiated on acute HD via her AV fistula  -Patient is going to require dialysis Monday Wednesday Friday  -Awaiting outpatient HD unit chair availability   -HD as per renal     Atrial flutter with RVR  -Restarted on her home diltiazem dose with adequate rate control  -Patient's anticoagulation was held initially and was started on heparin drip prior to procedure  -back on  home Eliquis       COPD  -Stable  -Continue inhalers     History of renal transplant  -Continue Prograf per nephrology recommendations; adjust the dose based on Prograf levels    Anemia of chronic disease  -Hemoglobin fluctuating between 7 and 8  -Transfuse as needed for hemoglobin less than 7 or symptomatic anemia  -Continue EPO injections     Hypothyroidism  -Continue Synthroid    VTE Prophylaxis:  Pharmacologic VTE prophylaxis orders are present.    Disposition Planning:   Barriers to Discharge: Awaiting outpatient dialysis chair time to be confirmed , improvement in respiratory status   Anticipated Date of Discharge: 1-2 days  Place of Discharge: home      Time: 35 minutes     Code Status and Medical Interventions: CPR (Attempt to Resuscitate); Full Support   Ordered at: 12/10/24 1609     Code Status (Patient has no pulse and is not breathing):    CPR (Attempt to Resuscitate)     Medical Interventions (Patient has pulse or is breathing):    Full Support       Signature: Electronically signed by Елена Russell  MD, 12/16/24, 21:38 EST.  [facility]ist Team

## 2024-12-17 NOTE — PLAN OF CARE
Goal Outcome Evaluation:  Plan of Care Reviewed With: patient, child        Progress: no change  Outcome Evaluation: To  have dialysis tomorrow. Still awaiting chair times for dialysis. Cardio consulted with no new orders.

## 2024-12-17 NOTE — PLAN OF CARE
Bed mobility - Mod-A supine to/from sit   Transfers - Mod-A, Max-A, and Assist x 2 sit to stand  sitting balance - Static - close supervision, dynamic - CGA/Augustus  Standing balance - static with modA x2 with arm in arm technique.    Anticipated Discharge Disposition (PT): skilled nursing facility

## 2024-12-17 NOTE — PLAN OF CARE
Problem: Adult Inpatient Plan of Care  Goal: Absence of Hospital-Acquired Illness or Injury  Intervention: Identify and Manage Fall Risk  Description: Perform standard risk assessment on admission using a validated tool or comprehensive approach appropriate to the patient; reassess fall risk frequently, with change in status or transfer to another level of care.  Communicate risk to interprofessional healthcare team; ensure fall risk visible cue.  Determine need for increased observation, equipment and environmental modification, as well as use of supportive, nonskid footwear.  Adjust safety measures to individual needs and identified risk factors.  Reinforce the importance of active participation with fall risk prevention, safety, and physical activity with the patient and family.  Perform regular intentional rounding to assess need for position change, pain assessment and personal needs, including assistance with toileting.  Recent Flowsheet Documentation  Taken 12/17/2024 0200 by Epifanio Lozano LPN  Safety Promotion/Fall Prevention:   safety round/check completed   room organization consistent   nonskid shoes/slippers when out of bed   muscle strengthening facilitated   mobility aid in reach   lighting adjusted   fall prevention program maintained   clutter free environment maintained   assistive device/personal items within reach   activity supervised  Taken 12/17/2024 0000 by Epifanio Lozano LPN  Safety Promotion/Fall Prevention:   safety round/check completed   room organization consistent   nonskid shoes/slippers when out of bed   muscle strengthening facilitated   mobility aid in reach   lighting adjusted   fall prevention program maintained   clutter free environment maintained   activity supervised   assistive device/personal items within reach  Taken 12/16/2024 2200 by Epifanio Lozano LPN  Safety Promotion/Fall Prevention:   safety round/check completed   room organization consistent    nonskid shoes/slippers when out of bed   muscle strengthening facilitated   mobility aid in reach   lighting adjusted   fall prevention program maintained   clutter free environment maintained   assistive device/personal items within reach   activity supervised  Taken 12/16/2024 2000 by Epifanio Lozano LPN  Safety Promotion/Fall Prevention:   safety round/check completed   room organization consistent   nonskid shoes/slippers when out of bed   muscle strengthening facilitated   mobility aid in reach   lighting adjusted   fall prevention program maintained   clutter free environment maintained   activity supervised   assistive device/personal items within reach  Taken 12/16/2024 1958 by Epifanio Lozano LPN  Safety Promotion/Fall Prevention:   room organization consistent   safety round/check completed   nonskid shoes/slippers when out of bed   muscle strengthening facilitated   mobility aid in reach   lighting adjusted   fall prevention program maintained   clutter free environment maintained   assistive device/personal items within reach   activity supervised  Intervention: Prevent Skin Injury  Description: Perform a screening for skin injury risk, such as pressure or moisture-associated skin damage on admission and at regular intervals throughout hospital stay.  Keep all areas of skin (especially folds) clean and dry.  Maintain adequate skin hydration.  Relieve and redistribute pressure and protect bony prominences and skin at risk for injury; implement measures based on patient-specific risk factors.  Match turning and repositioning schedule to clinical condition.  Encourage weight shift frequently; assist with reposition if unable to complete independently.  Float heels off bed; avoid pressure on the Achilles tendon.  Keep skin free from extended contact with medical devices.  Optimize nutrition and hydration.  Encourage functional activity and mobility, as early as tolerated.  Use aids (e.g., slide  boards, mechanical lift) during transfer.  Recent Flowsheet Documentation  Taken 12/17/2024 0300 by Epifanio Lozano LPN  Body Position:   turned   right  Taken 12/17/2024 0000 by Epifanio Lozano LPN  Skin Protection:   incontinence pads utilized   skin sealant/moisture barrier applied   silicone foam dressing in place  Taken 12/16/2024 2200 by Epifanio Lozano LPN  Body Position: weight shifting  Taken 12/16/2024 1958 by Epifanio Lozano LPN  Body Position: weight shifting  Skin Protection: incontinence pads utilized  Intervention: Prevent Infection  Description: Maintain skin and mucous membrane integrity; promote hand, oral and pulmonary hygiene.  Optimize fluid balance, nutrition, sleep and glycemic control to maximize infection resistance.  Identify potential sources of infection early to prevent or mitigate progression of infection (e.g., wound, lines, devices).  Evaluate ongoing need for invasive devices; remove promptly when no longer indicated.  Review vaccination status.  Recent Flowsheet Documentation  Taken 12/17/2024 0200 by Epifanio Lozano LPN  Infection Prevention:   visitors restricted/screened   single patient room provided   personal protective equipment utilized   rest/sleep promoted   hand hygiene promoted  Taken 12/17/2024 0000 by Epifanio Lozano LPN  Infection Prevention:   visitors restricted/screened   single patient room provided   rest/sleep promoted   personal protective equipment utilized   hand hygiene promoted  Taken 12/16/2024 2200 by Epifanio Lozano LPN  Infection Prevention:   visitors restricted/screened   rest/sleep promoted   single patient room provided   personal protective equipment utilized   equipment surfaces disinfected   hand hygiene promoted  Taken 12/16/2024 2000 by Epifanio Lozano LPN  Infection Prevention:   single patient room provided   visitors restricted/screened   rest/sleep promoted   personal protective equipment utilized    hand hygiene promoted  Taken 12/16/2024 1958 by Epifanio Lozano LPN  Infection Prevention:   rest/sleep promoted   visitors restricted/screened   single patient room provided   personal protective equipment utilized   hand hygiene promoted  Goal: Optimal Comfort and Wellbeing  Intervention: Monitor Pain and Promote Comfort  Description: Assess pain level, treatment efficacy and patient response at regular intervals using a consistent pain scale.  Consider the presence and impact of preexisting chronic pain.  Encourage patient and caregiver involvement in pain assessment, interventions and safety measures.  Promote activity; balance with sleep and rest to enhance healing.  Recent Flowsheet Documentation  Taken 12/17/2024 0000 by Epifanio Lozano LPN  Pain Management Interventions:   quiet environment facilitated   position adjusted   pain management plan reviewed with patient/caregiver   pain medication given  Taken 12/16/2024 2056 by Epifanio Lozano LPN  Pain Management Interventions:   quiet environment facilitated   position adjusted   pain management plan reviewed with patient/caregiver   pain medication given  Taken 12/16/2024 1958 by Epifanio Lozano LPN  Pain Management Interventions:   quiet environment facilitated   position adjusted   pain management plan reviewed with patient/caregiver   medication offered but refused  Intervention: Provide Person-Centered Care  Description: Use a family-focused approach to care; encourage support system presence and participation.  Develop trust and rapport by proactively providing information, encouraging questions, addressing concerns and offering reassurance.  Acknowledge emotional response to hospitalization.  Recognize and utilize personal coping strategies and strengths; develop goals via shared decision-making.  Honor spiritual and cultural preferences.  Recent Flowsheet Documentation  Taken 12/16/2024 2056 by Epifanio Lozano LPN  Trust  Relationship/Rapport: care explained  Taken 12/16/2024 1958 by Epifanio Lozano LPN  Trust Relationship/Rapport:   care explained   questions encouraged   reassurance provided   Goal Outcome Evaluation:progress on going , awaiting chair time to discharge, pt has to be able to transfer to chair before HD will aislinn chair time, progress on going will monitor

## 2024-12-17 NOTE — PROGRESS NOTES
"RENAL/KCC:     LOS: 17 days    Patient Care Team:  Kvng Guillen MD as PCP - General (Family Medicine)  Jak Gavin MD as Cardiologist (Cardiology)    Chief Complaint:  TONYA/CKD, Kidney transplant    Subjective     Interval History:   Chart reviewed  No new complaints    Objective     Vital Sign Min/Max for last 24 hours  Temp  Min: 97.5 °F (36.4 °C)  Max: 98.5 °F (36.9 °C)   BP  Min: 106/62  Max: 133/74   Pulse  Min: 89  Max: 110   Resp  Min: 10  Max: 17   SpO2  Min: 84 %  Max: 95 %   Flow (L/min) (Oxygen Therapy)  Min: 6  Max: 6   Weight  Min: 81.8 kg (180 lb 5.4 oz)  Max: 81.8 kg (180 lb 5.4 oz)     Flowsheet Rows      Flowsheet Row First Filed Value   Admission Height 167.6 cm (66\") Documented at 11/29/2024 1238   Admission Weight 74.5 kg (164 lb 3.9 oz) Documented at 11/29/2024 1238            I/O this shift:  In: 480 [P.O.:480]  Out: -   I/O last 3 completed shifts:  In: 1680 [P.O.:1680]  Out: 2000     Physical Exam:  GEN: Awake, NAD  ENT: PERRL, EOMI, MMM  NECK: Supple, no JVD  CHEST: CTAB, no W/R/C  CV: RRR, no M/G/R, +edema  ABD: Soft, NT, +BS  SKIN: Warm and Dry  NEURO: CN's intact      WBC WBC   Date Value Ref Range Status   12/17/2024 11.09 (H) 3.40 - 10.80 10*3/mm3 Final   12/16/2024 10.60 3.40 - 10.80 10*3/mm3 Final   12/15/2024 10.08 3.40 - 10.80 10*3/mm3 Final        HGB Hemoglobin   Date Value Ref Range Status   12/17/2024 7.9 (L) 12.0 - 15.9 g/dL Final   12/16/2024 8.1 (L) 12.0 - 15.9 g/dL Final   12/15/2024 8.1 (L) 12.0 - 15.9 g/dL Final   12/14/2024 8.4 (L) 12.0 - 15.9 g/dL Final        HCT Hematocrit   Date Value Ref Range Status   12/17/2024 27.1 (L) 34.0 - 46.6 % Final   12/16/2024 27.6 (L) 34.0 - 46.6 % Final   12/15/2024 28.0 (L) 34.0 - 46.6 % Final   12/14/2024 28.3 (L) 34.0 - 46.6 % Final        Platlets No results found for: \"LABPLAT\"   MCV MCV   Date Value Ref Range Status   12/17/2024 98.2 (H) 79.0 - 97.0 fL Final   12/16/2024 96.8 79.0 - 97.0 fL Final   12/15/2024 95.9 79.0 - 97.0 " "fL Final            Sodium Sodium   Date Value Ref Range Status   12/17/2024 133 (L) 136 - 145 mmol/L Final   12/16/2024 132 (L) 136 - 145 mmol/L Final   12/15/2024 131 (L) 136 - 145 mmol/L Final      Potassium Potassium   Date Value Ref Range Status   12/17/2024 4.2 3.5 - 5.2 mmol/L Final     Comment:     Specimen hemolyzed.  Result may be falsely elevated.   12/16/2024 4.6 3.5 - 5.2 mmol/L Final   12/15/2024 4.3 3.5 - 5.2 mmol/L Final      Chloride Chloride   Date Value Ref Range Status   12/17/2024 98 98 - 107 mmol/L Final   12/16/2024 98 98 - 107 mmol/L Final   12/15/2024 98 98 - 107 mmol/L Final      CO2 CO2   Date Value Ref Range Status   12/17/2024 26.4 22.0 - 29.0 mmol/L Final   12/16/2024 26.2 22.0 - 29.0 mmol/L Final   12/15/2024 24.9 22.0 - 29.0 mmol/L Final      BUN BUN   Date Value Ref Range Status   12/17/2024 29 (H) 8 - 23 mg/dL Final   12/16/2024 39 (H) 8 - 23 mg/dL Final   12/15/2024 33 (H) 8 - 23 mg/dL Final      Creatinine Creatinine   Date Value Ref Range Status   12/17/2024 2.12 (H) 0.57 - 1.00 mg/dL Final   12/16/2024 2.80 (H) 0.57 - 1.00 mg/dL Final   12/15/2024 2.74 (H) 0.57 - 1.00 mg/dL Final      Calcium Calcium   Date Value Ref Range Status   12/17/2024 8.7 8.6 - 10.5 mg/dL Final   12/16/2024 9.0 8.6 - 10.5 mg/dL Final   12/15/2024 8.8 8.6 - 10.5 mg/dL Final      PO4 No results found for: \"CAPO4\"   Albumin Albumin   Date Value Ref Range Status   12/17/2024 2.8 (L) 3.5 - 5.2 g/dL Final   12/16/2024 3.0 (L) 3.5 - 5.2 g/dL Final   12/15/2024 3.1 (L) 3.5 - 5.2 g/dL Final      Magnesium No results found for: \"MG\"     Uric Acid No results found for: \"URICACID\"        Results Review:     I reviewed the patient's new clinical results.    apixaban, 5 mg, Oral, BID  atorvastatin, 10 mg, Oral, Daily  cholestyramine light, 1 packet, Oral, Q24H  DULoxetine, 20 mg, Oral, Daily  epoetin rylee/rylee-epbx, 20,000 Units, Subcutaneous, Once per day on Monday Wednesday Friday  famotidine, 20 mg, Oral, Every " Other Day  hydrOXYzine, 25 mg, Oral, Nightly  levothyroxine, 50 mcg, Oral, Q AM  lidocaine, 1 Application, Topical, Once per day on Monday Wednesday Friday  metoprolol tartrate, 12.5 mg, Oral, Q12H  midodrine, 15 mg, Oral, TID AC  mirtazapine, 15 mg, Oral, Nightly  nystatin, , Topical, Q12H  predniSONE, 5 mg, Oral, Daily With Breakfast  QUEtiapine, 25 mg, Oral, Nightly  sildenafil, 10 mg, Oral, TID  sodium chloride, 10 mL, Intravenous, Q12H  sodium chloride, 10 mL, Intravenous, Q12H  sodium chloride, 10 mL, Intravenous, Q12H  tacrolimus, 0.5 mg, Oral, Q12H        Medication Review: Reviewed    Assessment & Plan     1) Kidney Transplant - On Prograf  2) TONYA - HARRY/ATN/CNI toxicity, Baseline Cr under 1.  3) DVT  4) Hypotension  5) Anemia of CKD  6) AMS    Plan: ATN now requiring acute dialysis MWF.  Cr still rising in between HD.  Midodrine for BP support.  Transplant US with no hydro and no arterial or venous stenosis.  Prograf level was 3.4 on 11/30 and jumped to 15.2 on 12/3 and then 17.6 on 12/7. Decreased Prograf to 0.5 mg daily and repeat level is down to 9.1 with repeat level from 12/14 pending.  Can change to 0.5 mg BID for now.  On AC.  Repleted iron.  Epo ordered. Transfuse prn.  ABX per primary.  Pulm following as well.      Outpatient HD is arranged      Naun Falcon MD  [facility]  12/17/24  05:51 EST

## 2024-12-17 NOTE — PROGRESS NOTES
[facility] MEDICINE SERVICE  DAILY PROGRESS NOTE    NAME: Jaja Carcamo  : 1945  MRN: [id number]      LOS: 17 days     PROVIDER OF SERVICE: Елена Russell MD    Chief Complaint: Acute deep vein thrombosis (DVT) of other vein of right upper extremity    Subjective:     Interval History:      Shortness of breath, remains on 6 L/min oxygen via nasal cannula.  Objective:     Vital Signs  Temp:  [97.5 °F (36.4 °C)-98.5 °F (36.9 °C)] 97.6 °F (36.4 °C)  Heart Rate:  [] 98  Resp:  [13-17] 16  BP: (101-117)/(55-81) 115/69  Flow (L/min) (Oxygen Therapy):  [6] 6   Body mass index is 29.11 kg/m².    Physical Exam    General: Alert and oriented, on oxygen via nasal cannula 6LPM.   HENT: Normocephalic, moist oral mucosa, no scleral icterus.  Neck: Supple, nontender, no carotid bruits, no JVD, no LAD.  Lungs: Clear to auscultation, nonlabored respiration.  Heart: RRR, no murmur, gallop or edema.  Abdomen: Soft, nontender, nondistended, + bowel sounds.  Musculoskeletal: Bilateral LE foot dependent edema  Neuro: alert and awake, moving all 4 extremities    Extremities:RUE nonpitting edema with nonfunctioning AV fistula, enlargement of right upper arm veins      Diagnostic Data    Results from last 7 days   Lab Units 24  0324   WBC 10*3/mm3 11.09*   HEMOGLOBIN g/dL 7.9*   HEMATOCRIT % 27.1*   PLATELETS 10*3/mm3 128*   GLUCOSE mg/dL 95   CREATININE mg/dL 2.12*   BUN mg/dL 29*   SODIUM mmol/L 133*   POTASSIUM mmol/L 4.2   ANION GAP mmol/L 8.6       No radiology results for the last day      I reviewed the patient's new clinical results.    Assessment/Plan:     Active and Resolved Problems  Active Hospital Problems    Diagnosis  POA    **Acute deep vein thrombosis (DVT) of other vein of right upper extremity [I82.621]  Yes    Acute DVT (deep venous thrombosis) [I82.409]  Yes      Resolved Hospital Problems   No resolved problems to display.      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 as well as high-grade venous stenosis  -Patient was already on Eliquis  -Vascular surgery consulted; patient underwent balloon angioplasty of right cephalic vein and subclavian vein    TONYA  -Likely contrast-induced nephropathy with ATN  -Renal function worse despite IV fluid resuscitation and patient now appears to be volume overloaded  -Initiated on acute HD via her AV fistula  -Patient is going to require dialysis Monday Wednesday Friday  -Awaiting outpatient HD chair time  -HD as per renal     Atrial flutter with RVR  -Restarted on her home diltiazem dose with adequate rate control  -Patient's anticoagulation was held initially and was started on heparin drip prior to procedure  -back on  home Eliquis       COPD  -Stable  -Continue inhalers  -Pulmonary following     History of renal transplant  -Continue Prograf per nephrology recommendations; adjust the dose based on Prograf levels    Anemia of chronic disease  -Hemoglobin fluctuating between 7 and 8  -Transfuse as needed for hemoglobin less than 7 or symptomatic anemia  -Continue EPO injections     Hypothyroidism  -Continue Synthroid    VTE Prophylaxis:  Pharmacologic VTE prophylaxis orders are present.    Disposition Planning:   Barriers to Discharge: Awaiting outpatient dialysis chair time to be confirmed , improvement in respiratory status   Anticipated Date of Discharge: 1-2 days  Place of Discharge: home      Time: 35 minutes     Code Status and Medical Interventions: CPR (Attempt to Resuscitate); Full Support   Ordered at: 12/10/24 1607     Code Status (Patient has no pulse and is not breathing):    CPR (Attempt to Resuscitate)     Medical Interventions (Patient has pulse or is breathing):    Full Support       Signature: Electronically signed by Елена Russell MD, 12/17/24, 15:31 EST.  [facility] Hospitalist Team

## 2024-12-17 NOTE — THERAPY TREATMENT NOTE
"Subjective: Pt agreeable to therapeutic plan of care.    Objective:     Precautions - falls, RUE limb restriction.    Bed mobility - Mod-A supine to/from sit   Transfers - Mod-A, Max-A, and Assist x 2 sit to stand  sitting balance - Static - close supervision, dynamic - CGA/Augustus  Standing balance - static with modA x2 with arm in arm technique.      Vitals: WNL    Pain: 4 VAS   Location: RLE  Intervention for pain: Repositioned and Therapeutic Presence    Education: Provided education on the importance of mobility in the acute care setting, Verbal/Tactile Cues, Transfer Training, and Energy conservation strategies    Assessment: Jaja Carcamo presents with slow progress toward goals, but with improved bed mobility today. Endurance,strength and functional mobility impairments indicate the need for skilled intervention. Tolerating session today without incident. Will continue to follow and progress as tolerated.     Plan/Recommendations:   If medically appropriate, Moderate Intensity Therapy recommended post-acute care. This is recommended as therapy feels the patient would require 3-4 days per week and wouldn't tolerate \"3 hour daily\" rehab intensity. SNF would be the preferred choice. If the patient does not agree to SNF, arrange HH or OP depending on home bound status. If patient is medically complex, consider LTACH. Pt requires no DME at discharge.     Pt desires Skilled Rehab placement at discharge. Pt cooperative; agreeable to therapeutic recommendations and plan of care.         Post-Tx Position: Supine with HOB Elevated, Alarms activated, and Call light and personal items within reach  PPE: gloves and gown    Therapy Charges for Today       Code Description Service Date Service Provider Modifiers Qty    27882713423 HC PT THERAPEUTIC ACT EA 15 MIN 12/17/2024 Alicja Veras, PT GP 1    [id number]  PT NEUROMUSC RE EDUCATION EA 15 MIN 12/17/2024 Alicja Veras, PT GP 1           PT Charges       Row Name " 12/17/24 1702             Time Calculation    Start Time 0949  -      Stop Time 1006  -      Time Calculation (min) 17 min  -      PT Non-Billable Time (min) 0 min  -      PT Received On 12/17/24  -      PT - Next Appointment 12/18/24  -         Time Calculation- PT    Total Timed Code Minutes- PT 17 minute(s)  -                User Key  (r) = Recorded By, (t) = Taken By, (c) = Cosigned By      Initials Name Provider Type    Alicja Harris, PT Physical Therapist

## 2024-12-17 NOTE — PROGRESS NOTES
Daily Progress Note          Assessment    Hypoxic respiratory insufficiency multifactorial   negative respiratory panel      right IJ DVT, brachial artery aneurysmal dilation/AV fistula  No PE on CAT scan  Pulm edema with bilateral pleural effusion left > right  A-fib with RVR was on Eliquis as an outpatient     Pulmonary hypertension  2D echo 10/4/2024  EF 58 % (Biplane Amato's method).   Severe biatrial enlargement.  moderate mitral regurgitation. moderate tricuspid regurgitation.   Calculated pulmonary systolic pressure of 72 mmHg.   Dilated inferior vena cava consistent with a mean right atrial pressure of 15  mmHg.      ? 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 difficile 6/7/2024  History of ESBL E. coli and 6/5/2024  Hypothyroidism  CAD  History of DVT     Pulmonary hypertension  HTN        Recommendations:     oxygen supplement and titration to maintain saturation 90-95%: Currently on 6 L per nasal cannula     Cont Low-dose Revatio 10 mg 3 times daily   midodrine 15 mg 3 times daily   continue to monitor blood pressure   No nitrates     Diuresis by nephrology  Immunosuppressive agents Prograf and prednisone 5 mg for renal transplant      Anticoagulation: Eliquis     antibiotics completed IV Zosyn     Avoid sedatives            Chest x-ray 12/1/2024                    LOS: 17 days     Subjective     Patient reports chronic shortness of breath    Objective     Vital signs for last 24 hours:  Vitals:    12/16/24 2056 12/16/24 2350 12/17/24 0300 12/17/24 0829   BP: 109/70 106/56 106/62 105/60   BP Location:  Right arm Right arm Right arm   Patient Position:  Lying Lying Lying   Pulse: 99 89 103 116   Resp:  13 17 14   Temp:  97.8 °F (36.6 °C) 98.5 °F (36.9 °C) 97.8 °F (36.6 °C)   TempSrc:  Oral Oral Oral   SpO2:  95% (!) 84% 93%   Weight:       Height:           Intake/Output last 3 shifts:  I/O last 3 completed shifts:  In: 1200  [P.O.:1200]  Out: 2000   Intake/Output this shift:  No intake/output data recorded.      Radiology  Imaging Results (Last 24 Hours)       ** No results found for the last 24 hours. **            Labs:  Results from last 7 days   Lab Units 12/17/24  0324   WBC 10*3/mm3 11.09*   HEMOGLOBIN g/dL 7.9*   HEMATOCRIT % 27.1*   PLATELETS 10*3/mm3 128*     Results from last 7 days   Lab Units 12/17/24  0324   SODIUM mmol/L 133*   POTASSIUM mmol/L 4.2   CHLORIDE mmol/L 98   CO2 mmol/L 26.4   BUN mg/dL 29*   CREATININE mg/dL 2.12*   CALCIUM mg/dL 8.7   GLUCOSE mg/dL 95           Results from last 7 days   Lab Units 12/17/24  0324 12/16/24  0156 12/15/24  0426   ALBUMIN g/dL 2.8* 3.0* 3.1*                                   Meds:   SCHEDULE  apixaban, 5 mg, Oral, BID  atorvastatin, 10 mg, Oral, Daily  cholestyramine light, 1 packet, Oral, Q24H  DULoxetine, 20 mg, Oral, Daily  epoetin rylee/rylee-epbx, 20,000 Units, Subcutaneous, Once per day on Monday Wednesday Friday  famotidine, 20 mg, Oral, Every Other Day  hydrOXYzine, 25 mg, Oral, Nightly  levothyroxine, 50 mcg, Oral, Q AM  lidocaine, 1 Application, Topical, Once per day on Monday Wednesday Friday  metoprolol tartrate, 12.5 mg, Oral, Q12H  midodrine, 15 mg, Oral, TID AC  mirtazapine, 15 mg, Oral, Nightly  nystatin, , Topical, Q12H  predniSONE, 5 mg, Oral, Daily With Breakfast  QUEtiapine, 25 mg, Oral, Nightly  sildenafil, 10 mg, Oral, TID  sodium chloride, 10 mL, Intravenous, Q12H  sodium chloride, 10 mL, Intravenous, Q12H  sodium chloride, 10 mL, Intravenous, Q12H  tacrolimus, 0.5 mg, Oral, Q12H      Infusions     PRNs    acetaminophen    senna-docusate sodium **AND** polyethylene glycol **AND** bisacodyl **AND** bisacodyl    influenza vaccine    metoprolol tartrate    ondansetron    oxyCODONE    prochlorperazine    [COMPLETED] Insert Peripheral IV **AND** sodium chloride    sodium chloride    sodium chloride    sodium chloride    sodium chloride    Physical Exam:  General  Appearance:  Alert: Looks chronically ill but not in acute distress  HEENT:  Normocephalic, without obvious abnormality, Conjunctiva/corneas clear,.   Nares normal, no drainage     Neck:  Supple, symmetrical, trachea midline.   Lungs /Chest wall:   Mild bilateral basal rhonchi, respirations unlabored, symmetrical wall movement.     Heart:  Regular rate and rhythm, S1 S2 normal  Abdomen: Soft, non-tender, no masses, no organomegaly.    Extremities: No edema, no clubbing or cyanosis     ROS  Constitutional: Negative for chills, fever and malaise/fatigue.   HENT: Negative.    Eyes: Negative.    Cardiovascular: Negative.    Respiratory: Positive for mild cough and shortness of breath.    Skin: Negative.    Musculoskeletal: Negative.    Gastrointestinal: Negative.    Genitourinary: Negative.    Neurological: Generalized weakness and memory difficulties      I reviewed the recent clinical results  I personally reviewed the latest radiological studies    Part of this note may be an electronic transcription/translation of spoken language to printed text using the Dragon Dictation System.

## 2024-12-17 NOTE — THERAPY TREATMENT NOTE
"Subjective: Pt agreeable to therapeutic plan of care.  Cognition: arousal/alertness: Alert and Attentive    Objective:     Bed Mobility: Mod-A   Functional Transfers: Max-A and Assist x 2     Balance: sitting EOB Supervision  Functional Ambulation: N/A or Not attempted.    Lower Body Dressing: Dependent  ADL Position: supine  ADL Comments: Socks    Vitals: WNL    Pain: 4 FACES  Location: LE  Interventions for pain: N/A  Education: Provided education on the importance of mobility in the acute care setting      Assessment: Jaja Carcamo presents with ADL impairments affecting function including balance, endurance / activity tolerance, postural / trunk control, shortness of breath, and strength. Demonstrated functioning below baseline abilities indicate the need for continued skilled intervention while inpatient. Tolerating session today without incident. Will continue to follow and progress as tolerated.     Plan/Recommendations:   Moderate Intensity Therapy recommended post-acute care. This is recommended as therapy feels the patient would require 3-4 days per week and wouldn't tolerate \"3 hour daily\" rehab intensity. SNF would be the preferred choice. If the patient does not agree to SNF, arrange HH or OP depending on home bound status. If patient is medically complex, consider LTACH.. Pt requires no DME at discharge.     Pt desires Skilled Rehab placement at discharge. Pt cooperative; agreeable to therapeutic recommendations and plan of care.     Modified [place]: N/A = No pre-op stroke/TIA    Post-Tx Position: Supine with HOB Elevated, Alarms activated, and Call light and personal items within reach  PPE: gloves and gown    Therapy Charges for Today       Code Description Service Date Service Provider Modifiers Qty    39203074580  OT SELF CARE/MGMT/TRAIN EA 15 MIN 12/17/2024 Ida Herndon OT GO 1    [id number]  OT THERAPEUTIC ACT EA 15 MIN 12/17/2024 Ida Herndon OT GO 1           Time " Calculation- OT       Row Name 12/17/24 1336             Time Calculation- OT    OT Start Time 0949  -SR      OT Stop Time 1006  -SR      OT Time Calculation (min) 17 min  -SR      Total Timed Code Minutes- OT 17 minute(s)  -SR      OT Received On 12/17/24  -SR      OT - Next Appointment 12/18/24  -SR                User Key  (r) = Recorded By, (t) = Taken By, (c) = Cosigned By      Initials Name Provider Type    SR Ida Herndon, OT Occupational Therapist

## 2024-12-17 NOTE — CASE MANAGEMENT/SOCIAL WORK
Continued Stay Note   [place]     Patient Name: Jaja Carcamo  MRN: [id number]  Today's Date: 12/17/2024    Admit Date: 11/29/2024    Plan: DC PLAN: [facility]. Okay to return. No PASRR or Precert needed. New HD M/W/F (awaiting chair time) May need transport.     Discharge Plan       Row Name 12/17/24 1159       Plan    Plan DC PLAN: [facility]. Okay to return. No PASRR or Precert needed. New HD M/W/F (awaiting chair time) May need transport.    Patient/Family in Agreement with Plan yes    Plan Comments Reached out to [facility], still awaiting chair time. Awaiting cardiology consult.      Received Call from [facility] with a chair time of 1520 for M/W/F. Called and updated [facility]. Unsure if able to provide transport. Awaiting response.                            Expected Discharge Date and Time       Expected Discharge Date Expected Discharge Time    Dec 18, 2024           Sofie Roman RN    Case Management  [phone]

## 2024-12-18 LAB
ALBUMIN SERPL-MCNC: 2.8 G/DL (ref 3.5–5.2)
ANION GAP SERPL CALCULATED.3IONS-SCNC: 7.4 MMOL/L (ref 5–15)
BASOPHILS # BLD AUTO: 0.03 10*3/MM3 (ref 0–0.2)
BASOPHILS NFR BLD AUTO: 0.4 % (ref 0–1.5)
BUN SERPL-MCNC: 34 MG/DL (ref 8–23)
BUN/CREAT SERPL: 14.7 (ref 7–25)
CALCIUM SPEC-SCNC: 8.7 MG/DL (ref 8.6–10.5)
CHLORIDE SERPL-SCNC: 97 MMOL/L (ref 98–107)
CO2 SERPL-SCNC: 26.6 MMOL/L (ref 22–29)
CREAT SERPL-MCNC: 2.32 MG/DL (ref 0.57–1)
DEPRECATED RDW RBC AUTO: 90.4 FL (ref 37–54)
EGFRCR SERPLBLD CKD-EPI 2021: 21.2 ML/MIN/1.73
EOSINOPHIL # BLD AUTO: 0.04 10*3/MM3 (ref 0–0.4)
EOSINOPHIL NFR BLD AUTO: 0.5 % (ref 0.3–6.2)
ERYTHROCYTE [DISTWIDTH] IN BLOOD BY AUTOMATED COUNT: 26.6 % (ref 12.3–15.4)
GLUCOSE SERPL-MCNC: 89 MG/DL (ref 65–99)
HCT VFR BLD AUTO: 25.6 % (ref 34–46.6)
HGB BLD-MCNC: 7.4 G/DL (ref 12–15.9)
IMM GRANULOCYTES # BLD AUTO: 0.04 10*3/MM3 (ref 0–0.05)
IMM GRANULOCYTES NFR BLD AUTO: 0.5 % (ref 0–0.5)
LYMPHOCYTES # BLD AUTO: 1.18 10*3/MM3 (ref 0.7–3.1)
LYMPHOCYTES NFR BLD AUTO: 15.3 % (ref 19.6–45.3)
MCH RBC QN AUTO: 28.4 PG (ref 26.6–33)
MCHC RBC AUTO-ENTMCNC: 28.9 G/DL (ref 31.5–35.7)
MCV RBC AUTO: 98.1 FL (ref 79–97)
MONOCYTES # BLD AUTO: 0.86 10*3/MM3 (ref 0.1–0.9)
MONOCYTES NFR BLD AUTO: 11.1 % (ref 5–12)
NEUTROPHILS NFR BLD AUTO: 5.57 10*3/MM3 (ref 1.7–7)
NEUTROPHILS NFR BLD AUTO: 72.2 % (ref 42.7–76)
NRBC BLD AUTO-RTO: 0.4 /100 WBC (ref 0–0.2)
PHOSPHATE SERPL-MCNC: 4.2 MG/DL (ref 2.5–4.5)
PLATELET # BLD AUTO: 111 10*3/MM3 (ref 140–450)
PMV BLD AUTO: 9.5 FL (ref 6–12)
POTASSIUM SERPL-SCNC: 4.1 MMOL/L (ref 3.5–5.2)
RBC # BLD AUTO: 2.61 10*6/MM3 (ref 3.77–5.28)
SODIUM SERPL-SCNC: 131 MMOL/L (ref 136–145)
WBC NRBC COR # BLD AUTO: 7.72 10*3/MM3 (ref 3.4–10.8)

## 2024-12-18 PROCEDURE — 80197 ASSAY OF TACROLIMUS: CPT | Performed by: INTERNAL MEDICINE

## 2024-12-18 PROCEDURE — 63710000001 TACROLIMUS PER 1 MG: Performed by: INTERNAL MEDICINE

## 2024-12-18 PROCEDURE — 80069 RENAL FUNCTION PANEL: CPT | Performed by: STUDENT IN AN ORGANIZED HEALTH CARE EDUCATION/TRAINING PROGRAM

## 2024-12-18 PROCEDURE — 85025 COMPLETE CBC W/AUTO DIFF WBC: CPT | Performed by: INTERNAL MEDICINE

## 2024-12-18 PROCEDURE — 25010000002 EPOETIN ALFA-EPBX 20000 UNIT/ML SOLUTION: Performed by: INTERNAL MEDICINE

## 2024-12-18 RX ORDER — OXYCODONE HYDROCHLORIDE 5 MG/1
10 TABLET ORAL EVERY 4 HOURS PRN
Status: DISCONTINUED | OUTPATIENT
Start: 2024-12-18 | End: 2024-12-19 | Stop reason: HOSPADM

## 2024-12-18 RX ORDER — FUROSEMIDE 40 MG/1
40 TABLET ORAL DAILY
Status: DISCONTINUED | OUTPATIENT
Start: 2024-12-18 | End: 2024-12-19 | Stop reason: HOSPADM

## 2024-12-18 RX ADMIN — HYDROXYZINE HYDROCHLORIDE 25 MG: 25 TABLET ORAL at 20:54

## 2024-12-18 RX ADMIN — APIXABAN 5 MG: 5 TABLET, FILM COATED ORAL at 20:54

## 2024-12-18 RX ADMIN — OXYCODONE 10 MG: 5 TABLET ORAL at 17:02

## 2024-12-18 RX ADMIN — Medication 10 ML: at 20:53

## 2024-12-18 RX ADMIN — MIDODRINE HYDROCHLORIDE 15 MG: 5 TABLET ORAL at 16:30

## 2024-12-18 RX ADMIN — BISACODYL 10 MG: 10 SUPPOSITORY RECTAL at 18:11

## 2024-12-18 RX ADMIN — MIRTAZAPINE 15 MG: 15 TABLET, FILM COATED ORAL at 20:53

## 2024-12-18 RX ADMIN — NYSTATIN: 100000 POWDER TOPICAL at 12:12

## 2024-12-18 RX ADMIN — EPOETIN ALFA-EPBX 20000 UNITS: 20000 INJECTION, SOLUTION INTRAVENOUS; SUBCUTANEOUS at 16:33

## 2024-12-18 RX ADMIN — Medication 12.5 MG: at 22:00

## 2024-12-18 RX ADMIN — MIDODRINE HYDROCHLORIDE 15 MG: 5 TABLET ORAL at 12:32

## 2024-12-18 RX ADMIN — NYSTATIN: 100000 POWDER TOPICAL at 20:57

## 2024-12-18 RX ADMIN — Medication 10 ML: at 12:12

## 2024-12-18 RX ADMIN — LEVOTHYROXINE SODIUM 50 MCG: 50 TABLET ORAL at 06:23

## 2024-12-18 RX ADMIN — MAGNESIUM HYDROXIDE 10 ML: 2400 SUSPENSION ORAL at 16:26

## 2024-12-18 RX ADMIN — QUETIAPINE FUMARATE 25 MG: 25 TABLET ORAL at 20:54

## 2024-12-18 RX ADMIN — SILDENAFIL 10 MG: 20 TABLET ORAL at 20:53

## 2024-12-18 RX ADMIN — FUROSEMIDE 40 MG: 40 TABLET ORAL at 16:29

## 2024-12-18 RX ADMIN — TACROLIMUS 0.5 MG: 0.5 CAPSULE ORAL at 20:53

## 2024-12-18 RX ADMIN — SILDENAFIL 10 MG: 20 TABLET ORAL at 16:29

## 2024-12-18 RX ADMIN — Medication 10 ML: at 20:52

## 2024-12-18 RX ADMIN — OXYCODONE 10 MG: 5 TABLET ORAL at 20:54

## 2024-12-18 RX ADMIN — LIDOCAINE 4% 1 APPLICATION: 4 CREAM TOPICAL at 12:13

## 2024-12-18 NOTE — PROGRESS NOTES
Daily Progress Note          Assessment    Hypoxic respiratory insufficiency multifactorial   negative respiratory panel      right IJ DVT, brachial artery aneurysmal dilation/AV fistula  No PE on CAT scan  Pulm edema with bilateral pleural effusion left > right  A-fib with RVR was on Eliquis as an outpatient     Pulmonary hypertension  2D echo 10/4/2024  EF 58 % (Biplane Amato's method).   Severe biatrial enlargement.  moderate mitral regurgitation. moderate tricuspid regurgitation.   Calculated pulmonary systolic pressure of 72 mmHg.   Dilated inferior vena cava consistent with a mean right atrial pressure of 15  mmHg.      ? 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 difficile 6/7/2024  History of ESBL E. coli and 6/5/2024  Hypothyroidism  CAD  History of DVT     Pulmonary hypertension  HTN        Recommendations:     oxygen supplement and titration to maintain saturation 90-95%: Currently on 6 L per nasal cannula     Cont Low-dose Revatio 10 mg 3 times daily   midodrine 15 mg 3 times daily   continue to monitor blood pressure   No nitrates     Diuresis by nephrology  Immunosuppressive agents Prograf and prednisone 5 mg for renal transplant      Anticoagulation: Eliquis     antibiotics completed IV Zosyn     Avoid sedatives            Chest x-ray 12/1/2024                    LOS: 18 days     Subjective     Patient reports chronic shortness of breath    Objective     Vital signs for last 24 hours:  Vitals:    12/18/24 1000 12/18/24 1030 12/18/24 1100 12/18/24 1105   BP: 102/52 113/53 114/63 115/55   BP Location:    Left arm   Patient Position:    Lying   Pulse: 98 101 83 99   Resp: 14 14 15 13   Temp:    97.8 °F (36.6 °C)   TempSrc:    Oral   SpO2:       Weight:       Height:           Intake/Output last 3 shifts:  I/O last 3 completed shifts:  In: 1320 [P.O.:1320]  Out: -   Intake/Output this shift:  I/O this shift:  In: 0   Out: 1500        Radiology  Imaging Results (Last 24 Hours)       ** No results found for the last 24 hours. **            Labs:  Results from last 7 days   Lab Units 12/18/24  0427   WBC 10*3/mm3 7.72   HEMOGLOBIN g/dL 7.4*   HEMATOCRIT % 25.6*   PLATELETS 10*3/mm3 111*     Results from last 7 days   Lab Units 12/18/24  0427   SODIUM mmol/L 131*   POTASSIUM mmol/L 4.1   CHLORIDE mmol/L 97*   CO2 mmol/L 26.6   BUN mg/dL 34*   CREATININE mg/dL 2.32*   CALCIUM mg/dL 8.7   GLUCOSE mg/dL 89           Results from last 7 days   Lab Units 12/18/24  0427 12/17/24  0324 12/16/24  0156   ALBUMIN g/dL 2.8* 2.8* 3.0*                                   Meds:   SCHEDULE  apixaban, 5 mg, Oral, BID  atorvastatin, 10 mg, Oral, Daily  cholestyramine light, 1 packet, Oral, Q24H  DULoxetine, 20 mg, Oral, Daily  epoetin rylee/rylee-epbx, 20,000 Units, Subcutaneous, Once per day on Monday Wednesday Friday  famotidine, 20 mg, Oral, Every Other Day  hydrOXYzine, 25 mg, Oral, Nightly  levothyroxine, 50 mcg, Oral, Q AM  lidocaine, 1 Application, Topical, Once per day on Monday Wednesday Friday  metoprolol tartrate, 12.5 mg, Oral, Q12H  midodrine, 15 mg, Oral, TID AC  mirtazapine, 15 mg, Oral, Nightly  nystatin, , Topical, Q12H  predniSONE, 5 mg, Oral, Daily With Breakfast  QUEtiapine, 25 mg, Oral, Nightly  sildenafil, 10 mg, Oral, TID  sodium chloride, 10 mL, Intravenous, Q12H  sodium chloride, 10 mL, Intravenous, Q12H  sodium chloride, 10 mL, Intravenous, Q12H  tacrolimus, 0.5 mg, Oral, Q12H      Infusions     PRNs    acetaminophen    senna-docusate sodium **AND** polyethylene glycol **AND** bisacodyl **AND** bisacodyl    influenza vaccine    metoprolol tartrate    ondansetron    oxyCODONE    prochlorperazine    [COMPLETED] Insert Peripheral IV **AND** sodium chloride    sodium chloride    sodium chloride    sodium chloride    sodium chloride    Physical Exam:  General Appearance:  Alert: Looks chronically ill but not in acute distress  HEENT:   Normocephalic, without obvious abnormality, Conjunctiva/corneas clear,.   Nares normal, no drainage     Neck:  Supple, symmetrical, trachea midline.   Lungs /Chest wall:   Mild bilateral basal rhonchi, respirations unlabored, symmetrical wall movement.     Heart:  Regular rate and rhythm, S1 S2 normal  Abdomen: Soft, non-tender, no masses, no organomegaly.    Extremities: No edema, no clubbing or cyanosis     ROS  Constitutional: Negative for chills, fever and malaise/fatigue.   HENT: Negative.    Eyes: Negative.    Cardiovascular: Negative.    Respiratory: Positive for mild cough and shortness of breath.    Skin: Negative.    Musculoskeletal: Negative.    Gastrointestinal: Negative.    Genitourinary: Negative.    Neurological: Generalized weakness and memory difficulties      I reviewed the recent clinical results  I personally reviewed the latest radiological studies    Part of this note may be an electronic transcription/translation of spoken language to printed text using the Dragon Dictation System.

## 2024-12-18 NOTE — PLAN OF CARE
Problem: Adult Inpatient Plan of Care  Goal: Absence of Hospital-Acquired Illness or Injury  Intervention: Identify and Manage Fall Risk  Description: Perform standard risk assessment on admission using a validated tool or comprehensive approach appropriate to the patient; reassess fall risk frequently, with change in status or transfer to another level of care.  Communicate risk to interprofessional healthcare team; ensure fall risk visible cue.  Determine need for increased observation, equipment and environmental modification, as well as use of supportive, nonskid footwear.  Adjust safety measures to individual needs and identified risk factors.  Reinforce the importance of active participation with fall risk prevention, safety, and physical activity with the patient and family.  Perform regular intentional rounding to assess need for position change, pain assessment and personal needs, including assistance with toileting.  Recent Flowsheet Documentation  Taken 12/18/2024 0200 by Epifanio Lozano LPN  Safety Promotion/Fall Prevention:   room organization consistent   safety round/check completed   nonskid shoes/slippers when out of bed   muscle strengthening facilitated   mobility aid in reach   lighting adjusted   fall prevention program maintained   clutter free environment maintained   assistive device/personal items within reach   activity supervised  Taken 12/18/2024 0000 by Epifanio Lozano LPN  Safety Promotion/Fall Prevention:   safety round/check completed   room organization consistent   nonskid shoes/slippers when out of bed   muscle strengthening facilitated   lighting adjusted   mobility aid in reach   fall prevention program maintained   clutter free environment maintained   assistive device/personal items within reach   activity supervised  Taken 12/17/2024 2200 by Epifanio Lozano LPN  Safety Promotion/Fall Prevention:   safety round/check completed   room organization consistent    nonskid shoes/slippers when out of bed   muscle strengthening facilitated   lighting adjusted   mobility aid in reach   fall prevention program maintained   clutter free environment maintained   assistive device/personal items within reach   activity supervised  Taken 12/17/2024 2006 by Epifanio Lozano LPN  Safety Promotion/Fall Prevention:   safety round/check completed   room organization consistent   nonskid shoes/slippers when out of bed   muscle strengthening facilitated   mobility aid in reach   lighting adjusted   fall prevention program maintained   clutter free environment maintained   assistive device/personal items within reach   activity supervised  Intervention: Prevent Skin Injury  Description: Perform a screening for skin injury risk, such as pressure or moisture-associated skin damage on admission and at regular intervals throughout hospital stay.  Keep all areas of skin (especially folds) clean and dry.  Maintain adequate skin hydration.  Relieve and redistribute pressure and protect bony prominences and skin at risk for injury; implement measures based on patient-specific risk factors.  Match turning and repositioning schedule to clinical condition.  Encourage weight shift frequently; assist with reposition if unable to complete independently.  Float heels off bed; avoid pressure on the Achilles tendon.  Keep skin free from extended contact with medical devices.  Optimize nutrition and hydration.  Encourage functional activity and mobility, as early as tolerated.  Use aids (e.g., slide boards, mechanical lift) during transfer.  Recent Flowsheet Documentation  Taken 12/18/2024 0400 by Epifanio Lozano LPN  Body Position: weight shifting  Skin Protection: incontinence pads utilized  Taken 12/18/2024 0200 by Epifanio Lozano LPN  Body Position: weight shifting  Taken 12/18/2024 0000 by Epifanio Lozano LPN  Body Position: weight shifting  Skin Protection: incontinence pads  utilized  Taken 12/17/2024 2200 by Epifanio Lozano LPN  Body Position: weight shifting  Taken 12/17/2024 2006 by Epifanio Lozano LPN  Body Position: weight shifting  Skin Protection: incontinence pads utilized  Intervention: Prevent and Manage VTE (Venous Thromboembolism) Risk  Description: Assess for VTE (venous thromboembolism) risk.  Promote early mobilization; encourage both active and passive leg exercises, if unable to ambulate.  Initiate and maintain compression or other therapy, as indicated, based on identified risk in accordance with organizational protocol and provider order.  Recognize the patient's individual risk for bleeding before initiating pharmacologic thromboprophylaxis.  Recent Flowsheet Documentation  Taken 12/17/2024 2006 by Epifanio Lozano LPN  VTE Prevention/Management:   patient refused intervention   SCDs (sequential compression devices) off  Intervention: Prevent Infection  Description: Maintain skin and mucous membrane integrity; promote hand, oral and pulmonary hygiene.  Optimize fluid balance, nutrition, sleep and glycemic control to maximize infection resistance.  Identify potential sources of infection early to prevent or mitigate progression of infection (e.g., wound, lines, devices).  Evaluate ongoing need for invasive devices; remove promptly when no longer indicated.  Review vaccination status.  Recent Flowsheet Documentation  Taken 12/18/2024 0400 by Epifanio Lozano LPN  Infection Prevention:   single patient room provided   visitors restricted/screened   rest/sleep promoted   personal protective equipment utilized   hand hygiene promoted  Taken 12/18/2024 0200 by Epifaino Lozano LPN  Infection Prevention:   visitors restricted/screened   single patient room provided   rest/sleep promoted   hand hygiene promoted   personal protective equipment utilized  Taken 12/18/2024 0000 by Epifanio Lozano LPN  Infection Prevention:   visitors  restricted/screened   single patient room provided   personal protective equipment utilized   rest/sleep promoted   hand hygiene promoted  Taken 12/17/2024 2200 by Epifanio Lozano LPN  Infection Prevention:   visitors restricted/screened   single patient room provided   rest/sleep promoted   personal protective equipment utilized   hand hygiene promoted  Taken 12/17/2024 2006 by Epifanio Lozano LPN  Infection Prevention:   visitors restricted/screened   single patient room provided   rest/sleep promoted   personal protective equipment utilized   hand hygiene promoted  Goal: Optimal Comfort and Wellbeing  Intervention: Monitor Pain and Promote Comfort  Description: Assess pain level, treatment efficacy and patient response at regular intervals using a consistent pain scale.  Consider the presence and impact of preexisting chronic pain.  Encourage patient and caregiver involvement in pain assessment, interventions and safety measures.  Promote activity; balance with sleep and rest to enhance healing.  Recent Flowsheet Documentation  Taken 12/18/2024 0000 by Epifanio Lozano LPN  Pain Management Interventions: position adjusted  Taken 12/17/2024 2112 by Epifanio Lozano LPN  Pain Management Interventions:   position adjusted   pain medication given   pain management plan reviewed with patient/caregiver  Taken 12/17/2024 2006 by Epifanio Lozano LPN  Pain Management Interventions:   quiet environment facilitated   position adjusted   pain management plan reviewed with patient/caregiver  Intervention: Provide Person-Centered Care  Description: Use a family-focused approach to care; encourage support system presence and participation.  Develop trust and rapport by proactively providing information, encouraging questions, addressing concerns and offering reassurance.  Acknowledge emotional response to hospitalization.  Recognize and utilize personal coping strategies and strengths; develop goals via  shared decision-making.  Honor spiritual and cultural preferences.  Recent Flowsheet Documentation  Taken 12/18/2024 0000 by Epifanio Lozano LPN  Trust Relationship/Rapport: care explained   Goal Outcome Evaluation:plan of care reviewed with pt , plan to discharge today once HD chair and transport set up, cont to monitor progress toward goal

## 2024-12-18 NOTE — PROGRESS NOTES
"RENAL/KCC:     LOS: 18 days    Patient Care Team:  Kvng Guillen MD as PCP - General (Family Medicine)  Jak Gavin MD as Cardiologist (Cardiology)    Chief Complaint:  TONYA/CKD, Kidney transplant    Subjective     Interval History:   Chart reviewed  No new complaints    Objective     Vital Sign Min/Max for last 24 hours  Temp  Min: 97.8 °F (36.6 °C)  Max: 98.3 °F (36.8 °C)   BP  Min: 93/68  Max: 127/67   Pulse  Min: 82  Max: 109   Resp  Min: 9  Max: 16   SpO2  Min: 88 %  Max: 97 %   Flow (L/min) (Oxygen Therapy)  Min: 4  Max: 4   No data recorded     Flowsheet Rows      Flowsheet Row First Filed Value   Admission Height 167.6 cm (66\") Documented at 11/29/2024 1238   Admission Weight 74.5 kg (164 lb 3.9 oz) Documented at 11/29/2024 1238            I/O this shift:  In: 0   Out: 2000 [Urine:500]  I/O last 3 completed shifts:  In: 1320 [P.O.:1320]  Out: -     Physical Exam:  GEN: Awake, NAD  ENT: PERRL, EOMI, MMM  NECK: Supple, no JVD  CHEST: CTAB, no W/R/C  CV: RRR, no M/G/R, +edema  ABD: Soft, NT, +BS  SKIN: Warm and Dry  NEURO: CN's intact      WBC WBC   Date Value Ref Range Status   12/18/2024 7.72 3.40 - 10.80 10*3/mm3 Final   12/17/2024 11.09 (H) 3.40 - 10.80 10*3/mm3 Final   12/16/2024 10.60 3.40 - 10.80 10*3/mm3 Final        HGB Hemoglobin   Date Value Ref Range Status   12/18/2024 7.4 (L) 12.0 - 15.9 g/dL Final   12/17/2024 7.9 (L) 12.0 - 15.9 g/dL Final   12/16/2024 8.1 (L) 12.0 - 15.9 g/dL Final        HCT Hematocrit   Date Value Ref Range Status   12/18/2024 25.6 (L) 34.0 - 46.6 % Final   12/17/2024 27.1 (L) 34.0 - 46.6 % Final   12/16/2024 27.6 (L) 34.0 - 46.6 % Final        Platlets No results found for: \"LABPLAT\"   MCV MCV   Date Value Ref Range Status   12/18/2024 98.1 (H) 79.0 - 97.0 fL Final   12/17/2024 98.2 (H) 79.0 - 97.0 fL Final   12/16/2024 96.8 79.0 - 97.0 fL Final            Sodium Sodium   Date Value Ref Range Status   12/18/2024 131 (L) 136 - 145 mmol/L Final   12/17/2024 133 (L) 136 - " "145 mmol/L Final   12/16/2024 132 (L) 136 - 145 mmol/L Final      Potassium Potassium   Date Value Ref Range Status   12/18/2024 4.1 3.5 - 5.2 mmol/L Final     Comment:     Specimen hemolyzed.  Result may be falsely elevated.   12/17/2024 4.2 3.5 - 5.2 mmol/L Final     Comment:     Specimen hemolyzed.  Result may be falsely elevated.   12/16/2024 4.6 3.5 - 5.2 mmol/L Final      Chloride Chloride   Date Value Ref Range Status   12/18/2024 97 (L) 98 - 107 mmol/L Final   12/17/2024 98 98 - 107 mmol/L Final   12/16/2024 98 98 - 107 mmol/L Final      CO2 CO2   Date Value Ref Range Status   12/18/2024 26.6 22.0 - 29.0 mmol/L Final   12/17/2024 26.4 22.0 - 29.0 mmol/L Final   12/16/2024 26.2 22.0 - 29.0 mmol/L Final      BUN BUN   Date Value Ref Range Status   12/18/2024 34 (H) 8 - 23 mg/dL Final   12/17/2024 29 (H) 8 - 23 mg/dL Final   12/16/2024 39 (H) 8 - 23 mg/dL Final      Creatinine Creatinine   Date Value Ref Range Status   12/18/2024 2.32 (H) 0.57 - 1.00 mg/dL Final   12/17/2024 2.12 (H) 0.57 - 1.00 mg/dL Final   12/16/2024 2.80 (H) 0.57 - 1.00 mg/dL Final      Calcium Calcium   Date Value Ref Range Status   12/18/2024 8.7 8.6 - 10.5 mg/dL Final   12/17/2024 8.7 8.6 - 10.5 mg/dL Final   12/16/2024 9.0 8.6 - 10.5 mg/dL Final      PO4 No results found for: \"CAPO4\"   Albumin Albumin   Date Value Ref Range Status   12/18/2024 2.8 (L) 3.5 - 5.2 g/dL Final   12/17/2024 2.8 (L) 3.5 - 5.2 g/dL Final   12/16/2024 3.0 (L) 3.5 - 5.2 g/dL Final      Magnesium No results found for: \"MG\"     Uric Acid No results found for: \"URICACID\"        Results Review:     I reviewed the patient's new clinical results.    apixaban, 5 mg, Oral, BID  atorvastatin, 10 mg, Oral, Daily  cholestyramine light, 1 packet, Oral, Q24H  DULoxetine, 20 mg, Oral, Daily  epoetin rylee/rylee-epbx, 20,000 Units, Subcutaneous, Once per day on Monday Wednesday Friday  famotidine, 20 mg, Oral, Every Other Day  furosemide, 40 mg, Oral, Daily  hydrOXYzine, 25 mg, " Oral, Nightly  levothyroxine, 50 mcg, Oral, Q AM  lidocaine, 1 Application, Topical, Once per day on Monday Wednesday Friday  metoprolol tartrate, 12.5 mg, Oral, Q12H  midodrine, 15 mg, Oral, TID AC  mirtazapine, 15 mg, Oral, Nightly  nystatin, , Topical, Q12H  predniSONE, 5 mg, Oral, Daily With Breakfast  QUEtiapine, 25 mg, Oral, Nightly  sildenafil, 10 mg, Oral, TID  sodium chloride, 10 mL, Intravenous, Q12H  sodium chloride, 10 mL, Intravenous, Q12H  sodium chloride, 10 mL, Intravenous, Q12H  tacrolimus, 0.5 mg, Oral, Q12H        Medication Review: Reviewed    Assessment & Plan     1) Kidney Transplant - On Prograf  2) TONYA - HARRY/ATN/CNI toxicity, Baseline Cr under 1.  3) DVT  4) Hypotension  5) Anemia of CKD  6) AMS    Plan: ATN now requiring acute dialysis MWF.  Cr still rising in between HD.  Midodrine for BP support.  Transplant US with no hydro and no arterial or venous stenosis.  Prograf level was 3.4 on 11/30 and jumped to 15.2 on 12/3 and then 17.6 on 12/7. Decreased Prograf to 0.5 mg daily and repeat level is down to 9.1 and now 3.7.  Continue Prograf 0.5 mg BID for now.  On AC.  Repleted iron.  Epo ordered. Transfuse prn.  ABX per primary.  Pulm following as well.      Outpatient HD is arranged.  OK for D/C.      Naun Falcon MD  [facility]  12/18/24  14:21 EST

## 2024-12-18 NOTE — CASE MANAGEMENT/SOCIAL WORK
Continued Stay Note   [facility]     Patient Name: Jaja Carcamo  MRN: [id number]  Today's Date: 12/18/2024    Admit Date: 11/29/2024    Plan: DC PLAN: [facility]. Okay to return. No PASRR or Precert. Needed. New HD M/W/F. (awaiting chair time) May need transport.       Discharge Plan       Row Name 12/18/24 1539       Plan    Plan DC PLAN: [facility]. Okay to return. No PASRR or Precert. Needed. New HD M/W/F. (awaiting chair time) May need transport.        Patient/Family in Agreement with Plan yes    Plan Comments Recieved message of chair time of 1520. [facility] notified but unable to provide transport because their transport leaves at 1500. Family unable to transport because patient is a Max 2 assist. Reaching out to [facility] to see about changing chair time to earlier, or even changing location. MD and bedside nurse made aware. Pending response from [facility].                    Expected Discharge Date and Time       Expected Discharge Date Expected Discharge Time    Dec 18, 2024           Sofie Roman, GHASSAN    Case Management  [phone]

## 2024-12-19 VITALS
OXYGEN SATURATION: 92 % | DIASTOLIC BLOOD PRESSURE: 54 MMHG | HEART RATE: 102 BPM | HEIGHT: 66 IN | BODY MASS INDEX: 28.98 KG/M2 | WEIGHT: 180.34 LBS | RESPIRATION RATE: 20 BRPM | TEMPERATURE: 97.2 F | SYSTOLIC BLOOD PRESSURE: 106 MMHG

## 2024-12-19 LAB
ALBUMIN SERPL-MCNC: 2.8 G/DL (ref 3.5–5.2)
ANION GAP SERPL CALCULATED.3IONS-SCNC: 5.2 MMOL/L (ref 5–15)
BASOPHILS # BLD AUTO: 0.03 10*3/MM3 (ref 0–0.2)
BASOPHILS NFR BLD AUTO: 0.3 % (ref 0–1.5)
BUN SERPL-MCNC: 21 MG/DL (ref 8–23)
BUN/CREAT SERPL: 12.4 (ref 7–25)
CALCIUM SPEC-SCNC: 8.3 MG/DL (ref 8.6–10.5)
CHLORIDE SERPL-SCNC: 97 MMOL/L (ref 98–107)
CO2 SERPL-SCNC: 30.8 MMOL/L (ref 22–29)
CREAT SERPL-MCNC: 1.69 MG/DL (ref 0.57–1)
DEPRECATED RDW RBC AUTO: 93 FL (ref 37–54)
EGFRCR SERPLBLD CKD-EPI 2021: 31 ML/MIN/1.73
EOSINOPHIL # BLD AUTO: 0.03 10*3/MM3 (ref 0–0.4)
EOSINOPHIL NFR BLD AUTO: 0.3 % (ref 0.3–6.2)
ERYTHROCYTE [DISTWIDTH] IN BLOOD BY AUTOMATED COUNT: 27.2 % (ref 12.3–15.4)
GLUCOSE SERPL-MCNC: 83 MG/DL (ref 65–99)
HCT VFR BLD AUTO: 27 % (ref 34–46.6)
HGB BLD-MCNC: 8.1 G/DL (ref 12–15.9)
IMM GRANULOCYTES # BLD AUTO: 0.07 10*3/MM3 (ref 0–0.05)
IMM GRANULOCYTES NFR BLD AUTO: 0.7 % (ref 0–0.5)
LYMPHOCYTES # BLD AUTO: 1.42 10*3/MM3 (ref 0.7–3.1)
LYMPHOCYTES NFR BLD AUTO: 14.2 % (ref 19.6–45.3)
MCH RBC QN AUTO: 29.8 PG (ref 26.6–33)
MCHC RBC AUTO-ENTMCNC: 30 G/DL (ref 31.5–35.7)
MCV RBC AUTO: 99.3 FL (ref 79–97)
MONOCYTES # BLD AUTO: 0.82 10*3/MM3 (ref 0.1–0.9)
MONOCYTES NFR BLD AUTO: 8.2 % (ref 5–12)
NEUTROPHILS NFR BLD AUTO: 7.65 10*3/MM3 (ref 1.7–7)
NEUTROPHILS NFR BLD AUTO: 76.3 % (ref 42.7–76)
NRBC BLD AUTO-RTO: 0.2 /100 WBC (ref 0–0.2)
PHOSPHATE SERPL-MCNC: 2.9 MG/DL (ref 2.5–4.5)
PLATELET # BLD AUTO: 93 10*3/MM3 (ref 140–450)
PMV BLD AUTO: 10.2 FL (ref 6–12)
POTASSIUM SERPL-SCNC: 3.7 MMOL/L (ref 3.5–5.2)
RBC # BLD AUTO: 2.72 10*6/MM3 (ref 3.77–5.28)
SODIUM SERPL-SCNC: 133 MMOL/L (ref 136–145)
TACROLIMUS BLD LC/MS/MS-MCNC: 4.7 NG/ML (ref 2–20)
WBC NRBC COR # BLD AUTO: 10.02 10*3/MM3 (ref 3.4–10.8)

## 2024-12-19 PROCEDURE — 63710000001 TACROLIMUS PER 1 MG: Performed by: INTERNAL MEDICINE

## 2024-12-19 PROCEDURE — 85025 COMPLETE CBC W/AUTO DIFF WBC: CPT | Performed by: INTERNAL MEDICINE

## 2024-12-19 PROCEDURE — 63710000001 PREDNISONE PER 5 MG: Performed by: STUDENT IN AN ORGANIZED HEALTH CARE EDUCATION/TRAINING PROGRAM

## 2024-12-19 PROCEDURE — 80069 RENAL FUNCTION PANEL: CPT | Performed by: STUDENT IN AN ORGANIZED HEALTH CARE EDUCATION/TRAINING PROGRAM

## 2024-12-19 RX ORDER — SILDENAFIL CITRATE 20 MG/1
10 TABLET ORAL 3 TIMES DAILY
Qty: 45 TABLET | Refills: 0 | Status: SHIPPED | OUTPATIENT
Start: 2024-12-19 | End: 2025-01-18

## 2024-12-19 RX ORDER — MIDODRINE HYDROCHLORIDE 5 MG/1
15 TABLET ORAL
Qty: 270 TABLET | Refills: 0 | Status: SHIPPED | OUTPATIENT
Start: 2024-12-19 | End: 2025-01-18

## 2024-12-19 RX ORDER — METOPROLOL TARTRATE 25 MG/1
12.5 TABLET, FILM COATED ORAL EVERY 12 HOURS SCHEDULED
Qty: 30 TABLET | Refills: 0 | Status: SHIPPED | OUTPATIENT
Start: 2024-12-19 | End: 2025-01-18

## 2024-12-19 RX ORDER — FUROSEMIDE 40 MG/1
40 TABLET ORAL DAILY
Qty: 30 TABLET | Refills: 0 | Status: SHIPPED | OUTPATIENT
Start: 2024-12-20 | End: 2025-01-19

## 2024-12-19 RX ORDER — LEVOTHYROXINE SODIUM 50 UG/1
50 TABLET ORAL
Qty: 30 TABLET | Refills: 0 | Status: SHIPPED | OUTPATIENT
Start: 2024-12-19 | End: 2024-12-19 | Stop reason: HOSPADM

## 2024-12-19 RX ADMIN — Medication 10 ML: at 08:56

## 2024-12-19 RX ADMIN — OXYCODONE 10 MG: 5 TABLET ORAL at 10:51

## 2024-12-19 RX ADMIN — NYSTATIN: 100000 POWDER TOPICAL at 08:56

## 2024-12-19 RX ADMIN — MIDODRINE HYDROCHLORIDE 15 MG: 5 TABLET ORAL at 10:48

## 2024-12-19 RX ADMIN — LEVOTHYROXINE SODIUM 50 MCG: 50 TABLET ORAL at 02:23

## 2024-12-19 RX ADMIN — OXYCODONE 10 MG: 5 TABLET ORAL at 02:23

## 2024-12-19 RX ADMIN — FUROSEMIDE 40 MG: 40 TABLET ORAL at 08:55

## 2024-12-19 RX ADMIN — APIXABAN 5 MG: 5 TABLET, FILM COATED ORAL at 08:55

## 2024-12-19 RX ADMIN — ATORVASTATIN CALCIUM 10 MG: 10 TABLET ORAL at 08:55

## 2024-12-19 RX ADMIN — TACROLIMUS 0.5 MG: 0.5 CAPSULE ORAL at 08:55

## 2024-12-19 RX ADMIN — PREDNISONE 5 MG: 5 TABLET ORAL at 08:55

## 2024-12-19 RX ADMIN — SILDENAFIL 10 MG: 20 TABLET ORAL at 08:55

## 2024-12-19 RX ADMIN — DULOXETINE HYDROCHLORIDE 20 MG: 20 CAPSULE, DELAYED RELEASE ORAL at 08:55

## 2024-12-19 RX ADMIN — MIDODRINE HYDROCHLORIDE 15 MG: 5 TABLET ORAL at 17:11

## 2024-12-19 RX ADMIN — Medication 12.5 MG: at 08:55

## 2024-12-19 RX ADMIN — MIDODRINE HYDROCHLORIDE 15 MG: 5 TABLET ORAL at 08:55

## 2024-12-19 RX ADMIN — Medication 10 ML: at 08:57

## 2024-12-19 RX ADMIN — SILDENAFIL 10 MG: 20 TABLET ORAL at 17:11

## 2024-12-19 NOTE — NURSING NOTE
Pt c/o of unable to sleep and staying asleep, at 0400 when pt VS were aislinn, I ok the CNA to allow pt to rest and sleep  and not take pt BP or temp

## 2024-12-19 NOTE — SIGNIFICANT NOTE
12/19/24 1124   Rehab Time/Intention   Session Not Performed   (Pt has discharge orders placed)   Recommendation   OT - Next Appointment 12/20/24

## 2024-12-19 NOTE — PLAN OF CARE
Problem: Adult Inpatient Plan of Care  Goal: Absence of Hospital-Acquired Illness or Injury  Intervention: Identify and Manage Fall Risk  Description: Perform standard risk assessment on admission using a validated tool or comprehensive approach appropriate to the patient; reassess fall risk frequently, with change in status or transfer to another level of care.  Communicate risk to interprofessional healthcare team; ensure fall risk visible cue.  Determine need for increased observation, equipment and environmental modification, as well as use of supportive, nonskid footwear.  Adjust safety measures to individual needs and identified risk factors.  Reinforce the importance of active participation with fall risk prevention, safety, and physical activity with the patient and family.  Perform regular intentional rounding to assess need for position change, pain assessment and personal needs, including assistance with toileting.  Recent Flowsheet Documentation  Taken 12/19/2024 0200 by Epifanio Lozano LPN  Safety Promotion/Fall Prevention:   safety round/check completed   room organization consistent   nonskid shoes/slippers when out of bed   muscle strengthening facilitated   mobility aid in reach   lighting adjusted   fall prevention program maintained   clutter free environment maintained   assistive device/personal items within reach   activity supervised  Taken 12/19/2024 0000 by Epifanio Lozano LPN  Safety Promotion/Fall Prevention:   safety round/check completed   room organization consistent   nonskid shoes/slippers when out of bed   muscle strengthening facilitated   mobility aid in reach   lighting adjusted   fall prevention program maintained   clutter free environment maintained   assistive device/personal items within reach   activity supervised  Taken 12/18/2024 2200 by Epifanio Lozano LPN  Safety Promotion/Fall Prevention:   safety round/check completed   room organization consistent    nonskid shoes/slippers when out of bed   mobility aid in reach   muscle strengthening facilitated   lighting adjusted   fall prevention program maintained   clutter free environment maintained   assistive device/personal items within reach   activity supervised  Taken 12/18/2024 2035 by Epifanio Lozano LPN  Safety Promotion/Fall Prevention:   room organization consistent   safety round/check completed   muscle strengthening facilitated   nonskid shoes/slippers when out of bed   mobility aid in reach   lighting adjusted   fall prevention program maintained   clutter free environment maintained   assistive device/personal items within reach   activity supervised  Intervention: Prevent Skin Injury  Description: Perform a screening for skin injury risk, such as pressure or moisture-associated skin damage on admission and at regular intervals throughout hospital stay.  Keep all areas of skin (especially folds) clean and dry.  Maintain adequate skin hydration.  Relieve and redistribute pressure and protect bony prominences and skin at risk for injury; implement measures based on patient-specific risk factors.  Match turning and repositioning schedule to clinical condition.  Encourage weight shift frequently; assist with reposition if unable to complete independently.  Float heels off bed; avoid pressure on the Achilles tendon.  Keep skin free from extended contact with medical devices.  Optimize nutrition and hydration.  Encourage functional activity and mobility, as early as tolerated.  Use aids (e.g., slide boards, mechanical lift) during transfer.  Recent Flowsheet Documentation  Taken 12/19/2024 0200 by Epifanio Lozano LPN  Body Position: patient/family refused  Taken 12/18/2024 2300 by Epifanio Lozano LPN  Body Position: (slight  adusted per pt , they didnt wan to completery turn)   weight shifting   other (see comments)  Taken 12/18/2024 2200 by Epifanio Lozano LPN  Body Position: weight  shifting  Taken 12/18/2024 2100 by Epifanio Lozano LPN  Skin Protection: incontinence pads utilized  Taken 12/18/2024 2035 by Epifanio Lozano LPN  Body Position: weight shifting  Intervention: Prevent and Manage VTE (Venous Thromboembolism) Risk  Description: Assess for VTE (venous thromboembolism) risk.  Promote early mobilization; encourage both active and passive leg exercises, if unable to ambulate.  Initiate and maintain compression or other therapy, as indicated, based on identified risk in accordance with organizational protocol and provider order.  Recognize the patient's individual risk for bleeding before initiating pharmacologic thromboprophylaxis.  Recent Flowsheet Documentation  Taken 12/18/2024 2035 by Epifanio Lozano LPN  VTE Prevention/Management: patient refused intervention  Intervention: Prevent Infection  Description: Maintain skin and mucous membrane integrity; promote hand, oral and pulmonary hygiene.  Optimize fluid balance, nutrition, sleep and glycemic control to maximize infection resistance.  Identify potential sources of infection early to prevent or mitigate progression of infection (e.g., wound, lines, devices).  Evaluate ongoing need for invasive devices; remove promptly when no longer indicated.  Review vaccination status.  Recent Flowsheet Documentation  Taken 12/19/2024 0200 by Epifanio Lozano LPN  Infection Prevention:   visitors restricted/screened   single patient room provided   rest/sleep promoted   personal protective equipment utilized   hand hygiene promoted  Taken 12/19/2024 0000 by Epifanio Lozano LPN  Infection Prevention:   visitors restricted/screened   single patient room provided   rest/sleep promoted   personal protective equipment utilized   hand hygiene promoted   equipment surfaces disinfected  Taken 12/18/2024 2200 by Epifanio Lozano LPN  Infection Prevention:   visitors restricted/screened   single patient room provided   rest/sleep  promoted   personal protective equipment utilized   hand hygiene promoted  Taken 12/18/2024 2035 by Epifanio Lozano LPN  Infection Prevention:   visitors restricted/screened   single patient room provided   rest/sleep promoted   personal protective equipment utilized  Goal: Optimal Comfort and Wellbeing  Intervention: Monitor Pain and Promote Comfort  Description: Assess pain level, treatment efficacy and patient response at regular intervals using a consistent pain scale.  Consider the presence and impact of preexisting chronic pain.  Encourage patient and caregiver involvement in pain assessment, interventions and safety measures.  Promote activity; balance with sleep and rest to enhance healing.  Recent Flowsheet Documentation  Taken 12/19/2024 0223 by Epifanio Lozano LPN  Pain Management Interventions:   position adjusted   pain management plan reviewed with patient/caregiver  Taken 12/19/2024 0000 by Epifanio Lozano LPN  Pain Management Interventions:   position adjusted   pain management plan reviewed with patient/caregiver   pain medication given  Taken 12/18/2024 2035 by Epifanio Lozano LPN  Pain Management Interventions:   position adjusted   pain management plan reviewed with patient/caregiver   Goal Outcome Evaluation: plan of care reviewed with pt continue to progress toward goal, will cont to progress toward goal , pending discharge once transportiation and HD chair time is arrange so  pt can return to LTC

## 2024-12-19 NOTE — CASE MANAGEMENT/SOCIAL WORK
"Physicians Statement of Medical Necessity for  Ambulance Transportation    GENERAL INFORMATION     Name: Jaja Carcamo  YOB: 1945  Medicare #:   Subscriber ID: [id number]     Transport Date: 12/19/2024 (Valid for round trips this date, or for scheduled repetitive trips for 60 days from the date signed below.)  Origin: [facility] Destination: [facility]  Is the Patient's stay covered under Medicare Part A (PPS/DRG?)Yes  Closest appropriate facility? Yes  If this a hosp-hosp transfer? No  Is this a hospice patient? No    MEDICAL NECESSITY QUESTIONAIRE    Ambulance Transportation is medically necessary only if other means of transportation are contraindicated or would be potentially harmful to the patient.  To meet this requirement, the patient must be either \"bed confined\" or suffer from a condition such that transport by means other than an ambulance is contraindicated by the patient's condition.  The following questions must be answered by the healthcare professional signing below for this form to be valid:     1) Describe the MEDICAL CONDITION (physical and/or mental) of this patient AT THE TIME OF AMBULANCE TRANSPORT that requires the patient to be transported in an ambulance, and why transport by other means is contraindicated by the patient's condition:     Bed Ridden. Coccyx Wound, Oxygen.      Past Medical History:   Diagnosis Date    Allergic rhinitis 04/14/2015    Asthma 08/10/2017    Atrial flutter 05/11/2017    Chronic diarrhea 04/15/2019    Chronic hypoxemic respiratory failure 01/18/2024    Chronic pain 02/03/2015    COPD (chronic obstructive pulmonary disease)     COVID-19 virus detected 08/11/2022    Cytokine release syndrome, grade 1 08/16/2022    Edema of both lower extremities due to peripheral venous insufficiency 03/12/2021    ESRD on hemodialysis 05/22/2013    Essential (primary) hypertension 03/03/2022    Fracture of ulnar styloid 05/19/2022    Fracture of " unspecified carpal bone, right wrist, subsequent encounter for fracture with routine healing 03/03/2022    Gastroesophageal reflux disease 11/12/2020    Gout 08/10/2017    Hearing loss 08/10/2017    History of appendectomy     History of DVT (deep vein thrombosis) 11/12/2020    History of kidney transplant 06/30/2017    History of lobectomy of lung 01/18/2024 03/08/2016:  RIGHT Lower Lobe Mass--> Right Video-assisted thoracoscopy with a moderate-to-large wedge resection of the RLL (by Dr. Haris Mcconnell @ [facility])--> Poorly differentiated carcinoma of the RLL.      History of repair of hip joint 05/21/2013    History of suicide attempt 05/20/2024    Hyperlipidemia 08/11/2014    Hypothyroidism, unspecified 03/03/2022    Infection due to extended spectrum beta lactamase (ESBL) producing bacteria 03/11/2016    No A2K system hx. +ESBL E coli urine on 3/11/16.      Irritable bowel syndrome 04/15/2019    Long term (current) use of immunosuppressive biologic 04/28/2021    Long term current use of anticoagulant therapy 01/18/2024    Malignant neoplasm of lung 08/10/2017    Menopausal flushing 08/30/2021    Mitral valve regurgitation 07/06/2015    Mixed anxiety and depressive disorder 04/30/2015    Non-small cell lung cancer 08/10/2017    Nonobstructive atherosclerosis of coronary artery 06/02/2019 08/12/2022: CATH: [facility]: NSTEMI assoc with Covid-19: LM:-nl;  LAD: diffuse, Ca++; 30%; CIRC: Dominant. Normal; RCA: small; 50% diffuse, proximal and mid-segment.      NSTEMI (non-ST elevated myocardial infarction) 08/11/2022    Obsessive-compulsive disorder 04/15/2019    Osteoarthritis 05/20/2024    Paroxysmal atrial fibrillation 05/11/2017    Paroxysmal supraventricular tachycardia 05/11/2017    Peripheral neuropathy 08/11/2014    Personal history of peptic ulcer disease 11/12/2020    Postoperative anemia due to acute blood loss 05/22/2013    Right wrist fracture 03/01/2022    Seasonal allergies 08/10/2017  "   Secondary hyperparathyroidism of renal origin 09/14/2015    Tricuspid valve regurgitation 03/15/2017    Ulcer of lower extremity 08/30/2021    Unspecified acute appendicitis 03/03/2022    Valvular heart disease 05/20/2024    Wegener's granulomatosis with renal involvement 03/03/2022      Past Surgical History:   Procedure Laterality Date    APPENDECTOMY      ARTERIOVENOUS FISTULA/SHUNT SURGERY Right 12/3/2024    Procedure: FISTULOGRAM  and angioplasty RIGHT ARM;  Surgeon: Paulino Alva II, MD;  Location: [facility] HYBRID OR;  Service: Vascular;  Laterality: Right;    BREAST SURGERY Left     cysts rmeoved    CARDIAC CATHETERIZATION Right 08/12/2022    Procedure: Left Heart Cath and coronary angiogram;  Surgeon: Jak Gavin MD;  Location: [facility] CATH INVASIVE LOCATION;  Service: Cardiology;  Laterality: Right;    CLOSED REDUCTION WRIST FRACTURE Right 03/01/2022    Procedure: WRIST CLOSED REDUCTION;  Surgeon: Gaudencio Townsend MD;  Location: [facility] MAIN OR;  Service: Orthopedics;  Laterality: Right;    CYSTOSCOPY      HIP ARTHROPLASTY      HYSTERECTOMY      LUNG LOBECTOMY Right     TRANSPLANTATION RENAL        2) Is this patient \"bed confined\" as defined below?Yes   To be \"bed confined\" the patient must satisfy all three of the following criteria:  (1) unable to get up from bed without assistance; AND (2) unable to ambulate;  AND (3) unable to sit in a chair or wheelchair.  3) Can this patient safely be transported by car or wheelchair van (I.e., may safely sit during transport, without an attendant or monitoring?)No   4. In addition to completing questions 1-3 above, please check any of the following conditions that apply*:          *Note: supporting documentation for any boxes checked must be maintained in the patient's medical records Moderate/severe pain on movement, Requires oxygen - unable to self administer, Unable to tolerate seated position for time needed to transport, and Unable to sit in a chair or wheelchair " due to decubitus ulcers or other wounds      SIGNATURE OF PHYSICIAN OR OTHER AUTHORIZED HEALTHCARE PROFESSIONAL    I certify that the above information is true and correct based on my evaluation of this patient, and represent that the patient requires transport by ambulance and that other forms of transport are contraindicated.  I understand that this information will be used by the Centers for Medicare and Medicaid Services (CMS) to support the determiniation of medical necessity for ambulance services, and I represent that I have personal knowledge of the patient's condition at the time of transport.       If this box is checked, I also certify that the patient is physically or mentally incapable of signing the ambulance service's claim form and that the institution with which I am affiliated has furnished care, services or assistance to the patient.  My signature below is made on behalf of the patient pursuant to 42 .36(b)(4). In accordance with 42 .37, the specific reason(s) that the patient is physically or mentally incapable of signing the claim for is as follows:     Signature of Physician or Healthcare Professional     TORV: Dr. Russell/Donald FERRELL Date/Time:     12/19/2024 0311     (For Scheduled repetitive transport, this form is not valid for transports performed more than 60 days after this date).                                                                                                                                            --------------------------------------------------------------------------------------------  Printed Name and Credentials of Physician or Authorized Healthcare Professional     *Form must be signed by patient's attending physician for scheduled, repetitive transports,.  For non-repetitive ambulance transports, if unable to obtain the signature of the attending physician, any of the following may sign (please select below):     Physician  Clinical Nurse  Specialist  Registered Nurse X    Physician Assistant  Discharge Planner  Licensed Practical Nurse     Nurse Practitioner   X

## 2024-12-19 NOTE — CASE MANAGEMENT/SOCIAL WORK
Continued Stay Note   [facility]     Patient Name: Jaja Carcamo  MRN: [id number]  Today's Date: 12/19/2024    Admit Date: 11/29/2024    Plan: DC PLAN: [facility]. Okay to return. No PASRR or Precert. New HD MWF (0820) [facility].       Discharge Plan       Row Name 12/19/24 1340       Plan    Plan DC PLAN: [facility]. Okay to return. No PASRR or Precert. New HD MWF (0820) [facility].      Patient/Family in Agreement with Plan yes    Plan Comments Recieved message from PEDRO Mcconnell able to change chair time to 0820. Spoke with [facility] liasion, able to provide transport to  tomorrow 12/20/24 for the 0820. Patient will need EMS transport at [facility]. Medical Necessity form completed, Requested via ad hoc in [employer]. Pharmacy confirmed. MD and bedside nurse updated. IMM Re Reviewed.  No need for follow up appointments due to SNF.                      Expected Discharge Date and Time       Expected Discharge Date Expected Discharge Time    Dec 19, 2024           Sofie Roman RN   Case Management  [phone]

## 2024-12-19 NOTE — PROGRESS NOTES
Daily Progress Note          Assessment    Hypoxic respiratory insufficiency multifactorial   negative respiratory panel      right IJ DVT, brachial artery aneurysmal dilation/AV fistula  No PE on CAT scan  Pulm edema with bilateral pleural effusion left > right  A-fib with RVR was on Eliquis as an outpatient     Pulmonary hypertension  2D echo 10/4/2024  EF 58 % (Biplane Amato's method).   Severe biatrial enlargement.  moderate mitral regurgitation. moderate tricuspid regurgitation.   Calculated pulmonary systolic pressure of 72 mmHg.   Dilated inferior vena cava consistent with a mean right atrial pressure of 15  mmHg.      ? 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 difficile 6/7/2024  History of ESBL E. coli and 6/5/2024  Hypothyroidism  CAD  History of DVT     Pulmonary hypertension  HTN        Recommendations:     oxygen supplement and titration to maintain saturation 90-95%: Currently on 6 L per nasal cannula     Cont Low-dose Revatio 10 mg 3 times daily   midodrine 15 mg 3 times daily   continue to monitor blood pressure   No nitrates     Diuresis by nephrology  Immunosuppressive agents Prograf and prednisone 5 mg for renal transplant      Anticoagulation: Eliquis     antibiotics completed IV Zosyn     Avoid sedatives            Chest x-ray 12/1/2024                    LOS: 19 days     Subjective     Patient reports chronic shortness of breath    Objective     Vital signs for last 24 hours:  Vitals:    12/18/24 2030 12/18/24 2335 12/19/24 0400 12/19/24 0849   BP: 102/56 95/48  (!) 89/44   BP Location: Left arm Left arm     Patient Position: Lying Lying     Pulse: 101 97 92 102   Resp: 20 13     Temp: 98.1 °F (36.7 °C) 98 °F (36.7 °C)     TempSrc: Oral Oral     SpO2: 96% 94% 95% 92%   Weight:       Height:           Intake/Output last 3 shifts:  I/O last 3 completed shifts:  In: 720 [P.O.:720]  Out: 2300 [Urine:800]  Intake/Output  this shift:  No intake/output data recorded.      Radiology  Imaging Results (Last 24 Hours)       ** No results found for the last 24 hours. **            Labs:  Results from last 7 days   Lab Units 12/19/24  0233   WBC 10*3/mm3 10.02   HEMOGLOBIN g/dL 8.1*   HEMATOCRIT % 27.0*   PLATELETS 10*3/mm3 93*     Results from last 7 days   Lab Units 12/19/24  0233   SODIUM mmol/L 133*   POTASSIUM mmol/L 3.7   CHLORIDE mmol/L 97*   CO2 mmol/L 30.8*   BUN mg/dL 21   CREATININE mg/dL 1.69*   CALCIUM mg/dL 8.3*   GLUCOSE mg/dL 83           Results from last 7 days   Lab Units 12/19/24  0233 12/18/24  0427 12/17/24  0324   ALBUMIN g/dL 2.8* 2.8* 2.8*                                   Meds:   SCHEDULE  apixaban, 5 mg, Oral, BID  atorvastatin, 10 mg, Oral, Daily  cholestyramine light, 1 packet, Oral, Q24H  DULoxetine, 20 mg, Oral, Daily  epoetin rylee/rylee-epbx, 20,000 Units, Subcutaneous, Once per day on Monday Wednesday Friday  famotidine, 20 mg, Oral, Every Other Day  furosemide, 40 mg, Oral, Daily  hydrOXYzine, 25 mg, Oral, Nightly  levothyroxine, 50 mcg, Oral, Q AM  lidocaine, 1 Application, Topical, Once per day on Monday Wednesday Friday  metoprolol tartrate, 12.5 mg, Oral, Q12H  midodrine, 15 mg, Oral, TID AC  mirtazapine, 15 mg, Oral, Nightly  nystatin, , Topical, Q12H  predniSONE, 5 mg, Oral, Daily With Breakfast  QUEtiapine, 25 mg, Oral, Nightly  sildenafil, 10 mg, Oral, TID  sodium chloride, 10 mL, Intravenous, Q12H  sodium chloride, 10 mL, Intravenous, Q12H  sodium chloride, 10 mL, Intravenous, Q12H  tacrolimus, 0.5 mg, Oral, Q12H      Infusions     PRNs    acetaminophen    senna-docusate sodium **AND** polyethylene glycol **AND** bisacodyl **AND** bisacodyl    influenza vaccine    magnesium hydroxide    metoprolol tartrate    ondansetron    oxyCODONE    prochlorperazine    [COMPLETED] Insert Peripheral IV **AND** sodium chloride    sodium chloride    sodium chloride    sodium chloride    sodium chloride    Physical  Exam:  General Appearance:  Alert: Looks chronically ill but not in acute distress  HEENT:  Normocephalic, without obvious abnormality, Conjunctiva/corneas clear,.   Nares normal, no drainage     Neck:  Supple, symmetrical, trachea midline.   Lungs /Chest wall:   Mild bilateral basal rhonchi, respirations unlabored, symmetrical wall movement.     Heart:  Regular rate and rhythm, S1 S2 normal  Abdomen: Soft, non-tender, no masses, no organomegaly.    Extremities: No edema, no clubbing or cyanosis     ROS  Constitutional: Negative for chills, fever and malaise/fatigue.   HENT: Negative.    Eyes: Negative.    Cardiovascular: Negative.    Respiratory: Positive for mild cough and shortness of breath.    Skin: Negative.    Musculoskeletal: Negative.    Gastrointestinal: Negative.    Genitourinary: Negative.    Neurological: Generalized weakness and memory difficulties      I reviewed the recent clinical results  I personally reviewed the latest radiological studies    Part of this note may be an electronic transcription/translation of spoken language to printed text using the Dragon Dictation System.

## 2024-12-19 NOTE — DISCHARGE SUMMARY
"             [facility]  Discharge Summary    Date of Service: 2024  Patient Name: Jaja Carcamo  : 1945  MRN: [id number]    Date of Admission: 2024  Discharge Diagnosis: Acute deep vein thrombosis (DVT) of other vein of right upper extremity  Date of Discharge: 2024  Primary Care Physician: Kvng Guillen MD      Presenting Problem:   A-V fistula [I77.0]  Atrial fibrillation with RVR [I48.91]  Arm DVT (deep venous thromboembolism), acute, right [I82.621]  Acute deep vein thrombosis (DVT) of other vein of right upper extremity [I82.621]  Acute DVT (deep venous thrombosis) [I82.409]    Active and Resolved Hospital Problems:  Active Hospital Problems    Diagnosis POA    **Acute deep vein thrombosis (DVT) of other vein of right upper extremity [I82.621] Yes    Acute DVT (deep venous thrombosis) [I82.409] Yes      Resolved Hospital Problems   No resolved problems to display.         Hospital Course     HPI:    \"Jaja Carcamo is a 79 y.o. female with a CMH of atrial flutter, COPD, previous history of ESRD now with renal transplant, hypertension who presents to the hospital with complaints of right arm swelling and pain.  The patient is a poor historian but was able to tell me that she has an AV fistula on her right arm for previous history of ESRD on dialysis.  However she received a renal transplant a few years ago and has not required hemodialysis since then.  She did notice that over the past few weeks she has had increasing right upper extremity pain and swelling that is extending all the way up to her right shoulder.  She is complaining of some enlargement of veins in her right upper arm and shoulder area.  She also complains of pain in that area.  She denies any shortness of breath, cough, congestion, fevers, chills.  RUE ultrasound in the ER did show extensive DVT of the right internal jugular vein. \"    Hospital Course:  Acute RUE DVT  -Imaging reveals right IJ " "DVT as well as brachial artery aneurysmal dilation likely related to her AV fistula as well as high-grade venous stenosis  -Patient was already on Eliquis  -Vascular surgery consulted; patient underwent balloon angioplasty of right cephalic vein and subclavian vein     TONYA  -Likely contrast-induced nephropathy with ATN  -Renal function worse despite IV fluid resuscitation and patient now appears to be volume overloaded  -Initiated on acute HD via her AV fistula  -Patient is going to require dialysis Monday Wednesday Friday  -Awaiting outpatient HD transportation arrangement  -HD as per renal, renal final recommendations noted as below,   \"ATN now requiring acute dialysis MWF.  Cr still rising in between HD.  Midodrine for BP support.  Transplant US with no hydro and no arterial or venous stenosis.  Prograf level was 3.4 on 11/30 and jumped to 15.2 on 12/3 and then 17.6 on 12/7. Decreased Prograf to 0.5 mg daily and repeat level is down to 9.1 and now 3.7.  Continue Prograf 0.5 mg BID for now. \"     Atrial flutter with RVR  -Restarted on her home diltiazem dose with adequate rate control  -Patient's anticoagulation was held initially and was started on heparin drip prior to procedure  -back on  home Eliquis        COPD  -Stable  -Continue inhalers  -Pulmonary following     History of renal transplant  -Continue Prograf per nephrology recommendations; adjust the dose based on Prograf levels     Anemia of chronic disease  -Hemoglobin fluctuating between 7 and 8  -Transfuse as needed for hemoglobin less than 7 or symptomatic anemia  -Continue EPO injections as per renal     Hypothyroidism  -Continue home dose Synthroid  -repeat TSH in 4 weeks.      Discussed with pharmacy, patient takes levothyroxine 50 mcg as per last fill history.  Pharmacist has confirmed with nursing home, patient is taking 100 mcg recently.    DISCHARGE Follow Up Recommendations for labs and diagnostics:   Follow up with primary care provider " within 3 days of this discharge.   Follow up with renal, vascular and pulmonary as outpatient.   Follow up with your primary cardiologist as outpatient.   Monitor BP and adjust medications accordingly.         Day of Discharge     Vital Signs:  Temp:  [97.2 °F (36.2 °C)-98.1 °F (36.7 °C)] 97.2 °F (36.2 °C)  Heart Rate:  [] 102  Resp:  [13-20] 20  BP: ()/(44-56) 106/54  Flow (L/min) (Oxygen Therapy):  [2-4] 2          Pertinent  and/or Most Recent Results     LAB RESULTS:      Lab 12/19/24  0233 12/18/24  0427 12/17/24  0324 12/16/24  0156 12/15/24  0426   WBC 10.02 7.72 11.09* 10.60 10.08   HEMOGLOBIN 8.1* 7.4* 7.9* 8.1* 8.1*   HEMATOCRIT 27.0* 25.6* 27.1* 27.6* 28.0*   PLATELETS 93* 111* 128* 130* 150   NEUTROS ABS 7.65* 5.57 8.22* 7.61* 6.81   IMMATURE GRANS (ABS) 0.07* 0.04 0.05 0.08* 0.07*   LYMPHS ABS 1.42 1.18 1.61 1.62 1.89   MONOS ABS 0.82 0.86 1.15* 1.26* 1.26*   EOS ABS 0.03 0.04 0.02 0.01 0.02   MCV 99.3* 98.1* 98.2* 96.8 95.9         Lab 12/19/24  0233 12/18/24 0427 12/17/24  0324 12/16/24  0156 12/15/24  0426   SODIUM 133* 131* 133* 132* 131*   POTASSIUM 3.7 4.1 4.2 4.6 4.3   CHLORIDE 97* 97* 98 98 98   CO2 30.8* 26.6 26.4 26.2 24.9   ANION GAP 5.2 7.4 8.6 7.8 8.1   BUN 21 34* 29* 39* 33*   CREATININE 1.69* 2.32* 2.12* 2.80* 2.74*   EGFR 31.0* 21.2* 23.6* 16.9* 17.3*   GLUCOSE 83 89 95 116* 100*   CALCIUM 8.3* 8.7 8.7 9.0 8.8   PHOSPHORUS 2.9 4.2 3.8 5.2* 4.8*         Lab 12/19/24  0233 12/18/24  0427 12/17/24  0324 12/16/24  0156 12/15/24  0426   ALBUMIN 2.8* 2.8* 2.8* 3.0* 3.1*                 Lab 12/14/24  0454   ABO TYPING O   RH TYPING Positive   ANTIBODY SCREEN Negative         Brief Urine Lab Results  (Last result in the past 365 days)        Color   Clarity   Blood   Leuk Est   Nitrite   Protein   CREAT   Urine HCG        06/05/24 1224 Yellow   Slightly Cloudy   Trace   Moderate (2+)   Negative   30 mg/dL (1+)                 Microbiology Results (last 10 days)       ** No results  found for the last 240 hours. **            US Renal Limited    Result Date: 12/3/2024  Impression: Impression: 1. Right lower pelvic renal transplant without hydronephrosis. 2. Right lower quadrant ascites. Electronically Signed: Franck Cortez MD  12/3/2024 12:37 PM EST  Workstation ID: [id number]    XR Chest 1 View    Result Date: 12/1/2024  Impression: Impression: 1.Significantly improved aeration of the left lung base. 2.Congestive heart failure, which appears mildly improved. Electronically Signed: German Chavira MD  12/1/2024 4:09 PM EST  Workstation ID: [id number]    XR Chest 1 View    Result Date: 12/1/2024  Impression: Impression: 1.Cardiomegaly and mild to moderate pulmonary vascular congestion. 2.Increasing, dense opacity of the lower 1/3 to 1/2 of the left hemithorax, which may represent increasing atelectasis +/- effusion. Electronically Signed: German Chavira MD  12/1/2024 10:29 AM EST  Workstation ID: [id number]    CT Angiogram Chest Pulmonary Embolism    Result Date: 12/1/2024  Impression: Impression: 1.No evidence of pulmonary embolus. 2.Congestive heart failure. 3.Small right and moderate left pleural effusions. 4.Ascites and anasarca. 5.Cholelithiasis. Electronically Signed: Mark Kiran MD  12/1/2024 10:20 AM EST  Workstation ID: [id number]    XR Chest 1 View    Result Date: 11/29/2024  Impression: Impression: 1.  Persistent retrocardiac left lower lobe airspace disease may represent atelectasis or pneumonia. 2.  No new airspace disease is identified. 2. Probable trace bibasilar pleural effusions. 3. Stable cardiomegaly. Electronically Signed: Karie Bustos MD  11/29/2024 1:39 PM EST  Workstation ID: [id number]     Results for orders placed during the hospital encounter of 11/29/24    Duplex Renal Artery - Right CAR    Interpretation Summary    Renal artery and vein on transplanted kidney patent without evidence of hemodynamically significant stenosis.      Results for orders placed during the hospital encounter  of 11/29/24    Duplex Renal Artery - Right CAR    Interpretation Summary    Renal artery and vein on transplanted kidney patent without evidence of hemodynamically significant stenosis.      Results for orders placed during the hospital encounter of 11/29/24    Adult Transthoracic Echo Complete W/ Cont if Necessary Per Protocol    Interpretation Summary    Left ventricular systolic function is normal. Calculated left ventricular EF = 70% Left ventricular ejection fraction appears to be 66 - 70%.    Left ventricular wall thickness is consistent with moderate to severe concentric hypertrophy.    Left ventricular diastolic function is consistent with (grade III w/high LAP) reversible restrictive pattern.    Moderately reduced right ventricular systolic function noted.    The right ventricular cavity is severely dilated.    The left atrial cavity is severely dilated.    The right atrial cavity is severely  dilated.    There is mild calcification of the aortic valve mainly affecting the left coronary and right coronary cusp(s).    Severe tricuspid valve regurgitation is present.    Estimated right ventricular systolic pressure from tricuspid regurgitation is markedly elevated (>55 mmHg).    Severe pulmonary hypertension is present.    There is a moderate sized left pleural effusion.      Labs Pending at Discharge:  Pending Results       Procedure [Order ID] Specimen - Date/Time    Tacrolimus Level [[id number]] Collected: 12/18/24 0427    Specimen: Blood from Arm, Left Updated: 12/18/24 0514    Tacrolimus Level [[id number]] Collected: 12/17/24 0324    Specimen: Blood from Arm, Left Updated: 12/17/24 0424            Procedures Performed  Procedure(s):  FISTULOGRAM  and angioplasty RIGHT ARM         Consults:   Consults       Date and Time Order Name Status Description    12/2/2024 12:35 PM Inpatient Nephrology Consult      12/1/2024  7:53 AM Inpatient Pulmonology Consult Completed     11/29/2024  4:09 PM Inpatient Nephrology  Consult Completed     11/29/2024  2:21 PM Inpatient Vascular Surgery Consult Completed               Discharge Details        Discharge Medications        New Medications        Instructions Start Date   midodrine 5 MG tablet  Commonly known as: PROAMATINE   15 mg, Oral, 3 Times Daily Before Meals      sildenafil 20 MG tablet  Commonly known as: REVATIO   10 mg, Oral, 3 Times Daily             Changes to Medications        Instructions Start Date   furosemide 40 MG tablet  Commonly known as: LASIX  What changed: when to take this   40 mg, Oral, Daily   Start Date: December 20, 2024     metoprolol tartrate 25 MG tablet  Commonly known as: LOPRESSOR  What changed:   medication strength  how much to take  when to take this   12.5 mg, Oral, Every 12 Hours Scheduled             Continue These Medications        Instructions Start Date   albuterol sulfate  (90 Base) MCG/ACT inhaler  Commonly known as: PROVENTIL HFA;VENTOLIN HFA;PROAIR HFA   2 puffs, Inhalation, Every 6 Hours PRN      Colestid 1 g tablet  Generic drug: colestipol   1 g, Oral, Daily      DULoxetine HCl 40 MG capsule delayed-release particles   1 capsule, Oral, Daily      Eliquis 5 MG tablet tablet  Generic drug: apixaban   Take 1 tablet by mouth Every 12 (Twelve) Hours.      hydrOXYzine 25 MG tablet  Commonly known as: ATARAX   25 mg, Oral, Nightly      levothyroxine 100 MCG tablet  Commonly known as: SYNTHROID, LEVOTHROID   1 tablet, Oral, Daily      MiraLax 17 GM/SCOOP powder  Generic drug: polyethylene glycol   17 g, Oral, Daily      mirtazapine 15 MG tablet  Commonly known as: REMERON   15 mg, Oral, Nightly      O2  Commonly known as: OXYGEN   3 L/min, Continuous      ondansetron 4 MG tablet  Commonly known as: ZOFRAN   4 mg, Oral, Every 8 Hours PRN      predniSONE 5 MG tablet  Commonly known as: DELTASONE   1 tablet, Oral, Daily      QUEtiapine 25 MG tablet  Commonly known as: SEROquel   25 mg, Oral, Nightly      sennosides-docusate 8.6-50 MG  per tablet  Commonly known as: PERICOLACE   1 tablet, Oral, 2 Times Daily      simvastatin 20 MG tablet  Commonly known as: ZOCOR   20 mg, Oral, Nightly      tacrolimus 0.5 MG capsule  Commonly known as: PROGRAF   0.5 mg, Oral, 2 Times Daily      Tylenol 325 MG tablet  Generic drug: acetaminophen   2 tablets, Oral, Every 4 Hours PRN      vitamin B-12 1000 MCG tablet  Commonly known as: CYANOCOBALAMIN   1,000 mcg, Oral, Daily      vitamin D 1.25 MG (60040 UT) capsule capsule  Commonly known as: ERGOCALCIFEROL   50,000 Units, Oral, Weekly             Stop These Medications      HYDROcodone-acetaminophen 5-325 MG per tablet  Commonly known as: NORCO     LORazepam 0.5 MG tablet  Commonly known as: ATIVAN     potassium chloride 10 MEQ CR capsule  Commonly known as: MICRO-K     Valium 5 MG tablet  Generic drug: diazePAM              Allergies   Allergen Reactions    Zolpidem Other (See Comments), Unknown (See Comments) and Unknown - High Severity     KEPT HER AWAKE  KEPT HER AWAKE  KEPT HER AWAKE  KEPT HER AWAKE           Discharge Disposition:   Skilled Nursing Facility ([place] - External)    Diet:  Hospital:  Diet Order   Procedures    Diet: Regular/House; Fluid Consistency: Thin (IDDSI 0)         Discharge Activity:   as tolerated       CODE STATUS:  Code Status and Medical Interventions: CPR (Attempt to Resuscitate); Full Support   Ordered at: 12/10/24 1607     Code Status (Patient has no pulse and is not breathing):    CPR (Attempt to Resuscitate)     Medical Interventions (Patient has pulse or is breathing):    Full Support         Future Appointments   Date Time Provider Department Center   1/2/2025  9:00 AM Paulino Alva II, MD [facility]           Time spent on Discharge including face to face service:  >35 minutes    Signature: Electronically signed by Елена Russell MD, 12/19/24, 15:16 EST.  [facility] Team

## 2024-12-19 NOTE — PROGRESS NOTES
"RENAL/KCC:     LOS: 19 days    Patient Care Team:  Kvng Guillen MD as PCP - General (Family Medicine)  Jak Gavin MD as Cardiologist (Cardiology)    Chief Complaint:  TONYA/CKD, Kidney transplant    Subjective     Interval History:   Chart reviewed  No new complaints  UOP improving    Objective     Vital Sign Min/Max for last 24 hours  Temp  Min: 97.5 °F (36.4 °C)  Max: 98.1 °F (36.7 °C)   BP  Min: 93/68  Max: 126/64   Pulse  Min: 82  Max: 109   Resp  Min: 13  Max: 20   SpO2  Min: 94 %  Max: 96 %   Flow (L/min) (Oxygen Therapy)  Min: 4  Max: 4   No data recorded     Flowsheet Rows      Flowsheet Row First Filed Value   Admission Height 167.6 cm (66\") Documented at 11/29/2024 1238   Admission Weight 74.5 kg (164 lb 3.9 oz) Documented at 11/29/2024 1238            No intake/output data recorded.  I/O last 3 completed shifts:  In: 720 [P.O.:720]  Out: 2300 [Urine:800]    Physical Exam:  GEN: Awake, NAD  ENT: PERRL, EOMI, MMM  NECK: Supple, no JVD  CHEST: CTAB, no W/R/C  CV: RRR, no M/G/R, +edema  ABD: Soft, NT, +BS  SKIN: Warm and Dry  NEURO: CN's intact      WBC WBC   Date Value Ref Range Status   12/19/2024 10.02 3.40 - 10.80 10*3/mm3 Final   12/18/2024 7.72 3.40 - 10.80 10*3/mm3 Final   12/17/2024 11.09 (H) 3.40 - 10.80 10*3/mm3 Final        HGB Hemoglobin   Date Value Ref Range Status   12/19/2024 8.1 (L) 12.0 - 15.9 g/dL Final   12/18/2024 7.4 (L) 12.0 - 15.9 g/dL Final   12/17/2024 7.9 (L) 12.0 - 15.9 g/dL Final        HCT Hematocrit   Date Value Ref Range Status   12/19/2024 27.0 (L) 34.0 - 46.6 % Final   12/18/2024 25.6 (L) 34.0 - 46.6 % Final   12/17/2024 27.1 (L) 34.0 - 46.6 % Final        Platlets No results found for: \"LABPLAT\"   MCV MCV   Date Value Ref Range Status   12/19/2024 99.3 (H) 79.0 - 97.0 fL Final   12/18/2024 98.1 (H) 79.0 - 97.0 fL Final   12/17/2024 98.2 (H) 79.0 - 97.0 fL Final            Sodium Sodium   Date Value Ref Range Status   12/19/2024 133 (L) 136 - 145 mmol/L Final " "  12/18/2024 131 (L) 136 - 145 mmol/L Final   12/17/2024 133 (L) 136 - 145 mmol/L Final      Potassium Potassium   Date Value Ref Range Status   12/19/2024 3.7 3.5 - 5.2 mmol/L Final   12/18/2024 4.1 3.5 - 5.2 mmol/L Final     Comment:     Specimen hemolyzed.  Result may be falsely elevated.   12/17/2024 4.2 3.5 - 5.2 mmol/L Final     Comment:     Specimen hemolyzed.  Result may be falsely elevated.      Chloride Chloride   Date Value Ref Range Status   12/19/2024 97 (L) 98 - 107 mmol/L Final   12/18/2024 97 (L) 98 - 107 mmol/L Final   12/17/2024 98 98 - 107 mmol/L Final      CO2 CO2   Date Value Ref Range Status   12/19/2024 30.8 (H) 22.0 - 29.0 mmol/L Final   12/18/2024 26.6 22.0 - 29.0 mmol/L Final   12/17/2024 26.4 22.0 - 29.0 mmol/L Final      BUN BUN   Date Value Ref Range Status   12/19/2024 21 8 - 23 mg/dL Final   12/18/2024 34 (H) 8 - 23 mg/dL Final   12/17/2024 29 (H) 8 - 23 mg/dL Final      Creatinine Creatinine   Date Value Ref Range Status   12/19/2024 1.69 (H) 0.57 - 1.00 mg/dL Final   12/18/2024 2.32 (H) 0.57 - 1.00 mg/dL Final   12/17/2024 2.12 (H) 0.57 - 1.00 mg/dL Final      Calcium Calcium   Date Value Ref Range Status   12/19/2024 8.3 (L) 8.6 - 10.5 mg/dL Final   12/18/2024 8.7 8.6 - 10.5 mg/dL Final   12/17/2024 8.7 8.6 - 10.5 mg/dL Final      PO4 No results found for: \"CAPO4\"   Albumin Albumin   Date Value Ref Range Status   12/19/2024 2.8 (L) 3.5 - 5.2 g/dL Final   12/18/2024 2.8 (L) 3.5 - 5.2 g/dL Final   12/17/2024 2.8 (L) 3.5 - 5.2 g/dL Final      Magnesium No results found for: \"MG\"     Uric Acid No results found for: \"URICACID\"        Results Review:     I reviewed the patient's new clinical results.    apixaban, 5 mg, Oral, BID  atorvastatin, 10 mg, Oral, Daily  cholestyramine light, 1 packet, Oral, Q24H  DULoxetine, 20 mg, Oral, Daily  epoetin rylee/rylee-epbx, 20,000 Units, Subcutaneous, Once per day on Monday Wednesday Friday  famotidine, 20 mg, Oral, Every Other Day  furosemide, 40 mg, " Oral, Daily  hydrOXYzine, 25 mg, Oral, Nightly  levothyroxine, 50 mcg, Oral, Q AM  lidocaine, 1 Application, Topical, Once per day on Monday Wednesday Friday  metoprolol tartrate, 12.5 mg, Oral, Q12H  midodrine, 15 mg, Oral, TID AC  mirtazapine, 15 mg, Oral, Nightly  nystatin, , Topical, Q12H  predniSONE, 5 mg, Oral, Daily With Breakfast  QUEtiapine, 25 mg, Oral, Nightly  sildenafil, 10 mg, Oral, TID  sodium chloride, 10 mL, Intravenous, Q12H  sodium chloride, 10 mL, Intravenous, Q12H  sodium chloride, 10 mL, Intravenous, Q12H  tacrolimus, 0.5 mg, Oral, Q12H        Medication Review: Reviewed    Assessment & Plan     1) Kidney Transplant - On Prograf  2) TONYA - HARRY/ATN/CNI toxicity, Baseline Cr under 1.  3) DVT  4) Hypotension  5) Anemia of CKD  6) AMS    Plan: ATN now requiring acute dialysis MWF.  Cr still rising in between HD.  Midodrine for BP support.  Transplant US with no hydro and no arterial or venous stenosis.  Prograf level was 3.4 on 11/30 and jumped to 15.2 on 12/3 and then 17.6 on 12/7. Decreased Prograf to 0.5 mg daily and repeat level is down to 9.1 and now 3.7.  Continue Prograf 0.5 mg BID for now.  On AC.  Repleted iron.  Epo ordered. Transfuse prn.  ABX per primary.  Pulm following as well.      Outpatient HD is arranged.  OK for D/C.      Naun Falcon MD  [facility]  12/19/24  07:45 EST

## 2024-12-19 NOTE — PROGRESS NOTES
Nutrition Services  Patient Name: Jaja Carcamo  YOB: 1945  MRN: [id number]  Admission date: 11/29/2024    PROGRESS NOTE      Nutrition Intervention Updates: Diet liberalization to Regular related to K+ WNL for greater than 1 week.         Encounter Information: Progress note to check on PO Intakes.  Noted with plans to D/C today.         PO Diet: Diet: Regular/House, Renal; Low Potassium; Fluid Consistency: Thin (IDDSI 0)   PO Supplements: Magic Cup BID   PO Intake:  %       Current nutrition support:    Nutrition support review:        Labs (reviewed below): Reviewed, management per attending  K+ WNL       GI Function:  BM 12/19 - today       Brief weight review   Wt Readings from Last 10 Encounters:   12/16/24 1500 81.8 kg (180 lb 5.4 oz)   12/09/24 0644 85.7 kg (188 lb 15 oz)   12/03/24 0927 81.6 kg (180 lb)   12/01/24 0330 81.8 kg (180 lb 5.4 oz)   11/29/24 1715 81 kg (178 lb 9.2 oz)   11/29/24 1238 74.5 kg (164 lb 3.9 oz)   06/13/24 0452 74.4 kg (164 lb 0.4 oz)   06/11/24 0600 67.5 kg (148 lb 13 oz)   06/08/24 0400 62.5 kg (137 lb 12.6 oz)   06/06/24 0447 64.4 kg (141 lb 15.6 oz)   06/05/24 1152 71.3 kg (157 lb 3 oz)   06/03/24 0501 71.3 kg (157 lb 3 oz)   06/01/24 0644 72.2 kg (159 lb 2.8 oz)   05/29/24 0445 84.7 kg (186 lb 11.7 oz)   05/28/24 0444 84.2 kg (185 lb 10 oz)   05/27/24 0526 84.9 kg (187 lb 2.7 oz)   05/26/24 0406 84.6 kg (186 lb 8.2 oz)   05/25/24 1100 84.6 kg (186 lb 8.2 oz)   05/25/24 0438 85.1 kg (187 lb 9.8 oz)   05/24/24 1143 86.2 kg (190 lb)   05/22/24 0314 89.3 kg (196 lb 13.9 oz)   05/21/24 0430 88.6 kg (195 lb 5.2 oz)   05/20/24 1503 92.1 kg (203 lb)   05/20/24 0413 92.3 kg (203 lb 7.8 oz)   05/19/24 0500 89.9 kg (198 lb 3.1 oz)   05/18/24 1100 88.9 kg (195 lb 15.8 oz)   05/18/24 0308 92.9 kg (204 lb 12.9 oz)   05/17/24 2150 80.7 kg (178 lb)   08/16/22 0535 84.7 kg (186 lb 11.7 oz)   08/14/22 0433 81.3 kg (179 lb 3.7 oz)   08/12/22 0500 81.4 kg (179 lb 7.3 oz)    08/11/22 2154 81.4 kg (179 lb 7.3 oz)   08/11/22 1301 81.6 kg (180 lb)   03/01/22 0804 83 kg (182 lb 15.7 oz)   03/01/22 0527 83 kg (182 lb 15.7 oz)   02/28/22 2241 81 kg (178 lb 9.2 oz)   10/14/20 1055 (P) 85.3 kg (188 lb)        Results from last 7 days   Lab Units 12/19/24  0233 12/18/24  0427 12/17/24  0324   SODIUM mmol/L 133* 131* 133*   POTASSIUM mmol/L 3.7 4.1 4.2   CHLORIDE mmol/L 97* 97* 98   CO2 mmol/L 30.8* 26.6 26.4   BUN mg/dL 21 34* 29*   CREATININE mg/dL 1.69* 2.32* 2.12*   CALCIUM mg/dL 8.3* 8.7 8.7   GLUCOSE mg/dL 83 89 95     Results from last 7 days   Lab Units 12/19/24  0233   PHOSPHORUS mg/dL 2.9   HEMOGLOBIN g/dL 8.1*   HEMATOCRIT % 27.0*     RD to follow up per protocol.    Electronically signed by:  Tavia Jones, REYNALDO  12/19/24 14:22 EST

## 2024-12-20 NOTE — CASE MANAGEMENT/SOCIAL WORK
Case Management Discharge Note      Final Note: [facility]         Selected Continued Care - Discharged on 12/19/2024 Admission date: 11/29/2024 - Discharge disposition: Skilled Nursing Facility (DC - External)      Destination Coordination complete.      Service Provider Services Address Phone Fax Patient Preferred    [address] [facility] [address] [place] [address] [phone] --             Transportation Services  Ambulance: [facility]  [facility] Ambulance Status: Accepted    Final Discharge Disposition Code: 03 - skilled nursing facility (SNF)

## 2024-12-21 LAB — TACROLIMUS BLD LC/MS/MS-MCNC: 4.1 NG/ML (ref 2–20)

## 2024-12-24 ENCOUNTER — HOSPITAL ENCOUNTER (EMERGENCY)
Facility: HOSPITAL | Age: 79
Discharge: NURSING, REHAB OR LONG-TERM CARE | End: 2024-12-24
Admitting: EMERGENCY MEDICINE
Payer: MEDICARE

## 2024-12-24 VITALS
TEMPERATURE: 99.2 F | BODY MASS INDEX: 28.93 KG/M2 | DIASTOLIC BLOOD PRESSURE: 66 MMHG | SYSTOLIC BLOOD PRESSURE: 117 MMHG | HEART RATE: 107 BPM | HEIGHT: 66 IN | WEIGHT: 180 LBS | RESPIRATION RATE: 17 BRPM | OXYGEN SATURATION: 98 %

## 2024-12-24 DIAGNOSIS — N18.9 ANEMIA DUE TO CHRONIC KIDNEY DISEASE, UNSPECIFIED CKD STAGE: Primary | ICD-10-CM

## 2024-12-24 DIAGNOSIS — R53.1 GENERALIZED WEAKNESS: ICD-10-CM

## 2024-12-24 DIAGNOSIS — R42 DIZZINESS: ICD-10-CM

## 2024-12-24 DIAGNOSIS — D63.1 ANEMIA DUE TO CHRONIC KIDNEY DISEASE, UNSPECIFIED CKD STAGE: Primary | ICD-10-CM

## 2024-12-24 LAB
ABO GROUP BLD: NORMAL
ALBUMIN SERPL-MCNC: 3.1 G/DL (ref 3.5–5.2)
ALBUMIN/GLOB SERPL: 1.4 G/DL
ALP SERPL-CCNC: 63 U/L (ref 39–117)
ALT SERPL W P-5'-P-CCNC: 6 U/L (ref 1–33)
ANION GAP SERPL CALCULATED.3IONS-SCNC: 8.9 MMOL/L (ref 5–15)
AST SERPL-CCNC: 14 U/L (ref 1–32)
BASOPHILS # BLD AUTO: 0.03 10*3/MM3 (ref 0–0.2)
BASOPHILS NFR BLD AUTO: 0.5 % (ref 0–1.5)
BILIRUB SERPL-MCNC: 0.6 MG/DL (ref 0–1.2)
BLD GP AB SCN SERPL QL: NEGATIVE
BUN SERPL-MCNC: 30 MG/DL (ref 8–23)
BUN/CREAT SERPL: 22.2 (ref 7–25)
CALCIUM SPEC-SCNC: 8.8 MG/DL (ref 8.6–10.5)
CHLORIDE SERPL-SCNC: 98 MMOL/L (ref 98–107)
CO2 SERPL-SCNC: 29.1 MMOL/L (ref 22–29)
CREAT SERPL-MCNC: 1.35 MG/DL (ref 0.57–1)
DEPRECATED RDW RBC AUTO: 96.5 FL (ref 37–54)
EGFRCR SERPLBLD CKD-EPI 2021: 40.6 ML/MIN/1.73
EOSINOPHIL # BLD AUTO: 0.02 10*3/MM3 (ref 0–0.4)
EOSINOPHIL NFR BLD AUTO: 0.3 % (ref 0.3–6.2)
ERYTHROCYTE [DISTWIDTH] IN BLOOD BY AUTOMATED COUNT: 26.6 % (ref 12.3–15.4)
GLOBULIN UR ELPH-MCNC: 2.2 GM/DL
GLUCOSE SERPL-MCNC: 160 MG/DL (ref 65–99)
HCT VFR BLD AUTO: 24.1 % (ref 34–46.6)
HGB BLD-MCNC: 7.1 G/DL (ref 12–15.9)
HOLD SPECIMEN: NORMAL
IMM GRANULOCYTES # BLD AUTO: 0.02 10*3/MM3 (ref 0–0.05)
IMM GRANULOCYTES NFR BLD AUTO: 0.3 % (ref 0–0.5)
LYMPHOCYTES # BLD AUTO: 0.53 10*3/MM3 (ref 0.7–3.1)
LYMPHOCYTES NFR BLD AUTO: 8.1 % (ref 19.6–45.3)
MCH RBC QN AUTO: 30.5 PG (ref 26.6–33)
MCHC RBC AUTO-ENTMCNC: 29.5 G/DL (ref 31.5–35.7)
MCV RBC AUTO: 103.4 FL (ref 79–97)
MONOCYTES # BLD AUTO: 0.41 10*3/MM3 (ref 0.1–0.9)
MONOCYTES NFR BLD AUTO: 6.3 % (ref 5–12)
NEUTROPHILS NFR BLD AUTO: 5.53 10*3/MM3 (ref 1.7–7)
NEUTROPHILS NFR BLD AUTO: 84.5 % (ref 42.7–76)
NRBC BLD AUTO-RTO: 0 /100 WBC (ref 0–0.2)
PLATELET # BLD AUTO: 95 10*3/MM3 (ref 140–450)
PMV BLD AUTO: 10 FL (ref 6–12)
POTASSIUM SERPL-SCNC: 4 MMOL/L (ref 3.5–5.2)
PROT SERPL-MCNC: 5.3 G/DL (ref 6–8.5)
RBC # BLD AUTO: 2.33 10*6/MM3 (ref 3.77–5.28)
RH BLD: POSITIVE
SODIUM SERPL-SCNC: 136 MMOL/L (ref 136–145)
T&S EXPIRATION DATE: NORMAL
WBC NRBC COR # BLD AUTO: 6.54 10*3/MM3 (ref 3.4–10.8)
WHOLE BLOOD HOLD COAG: NORMAL

## 2024-12-24 PROCEDURE — 36430 TRANSFUSION BLD/BLD COMPNT: CPT

## 2024-12-24 PROCEDURE — 86900 BLOOD TYPING SEROLOGIC ABO: CPT

## 2024-12-24 PROCEDURE — 99283 EMERGENCY DEPT VISIT LOW MDM: CPT

## 2024-12-24 PROCEDURE — 86923 COMPATIBILITY TEST ELECTRIC: CPT

## 2024-12-24 PROCEDURE — 80053 COMPREHEN METABOLIC PANEL: CPT

## 2024-12-24 PROCEDURE — 85025 COMPLETE CBC W/AUTO DIFF WBC: CPT

## 2024-12-24 PROCEDURE — 86901 BLOOD TYPING SEROLOGIC RH(D): CPT

## 2024-12-24 PROCEDURE — P9016 RBC LEUKOCYTES REDUCED: HCPCS

## 2024-12-24 PROCEDURE — 93005 ELECTROCARDIOGRAM TRACING: CPT

## 2024-12-24 PROCEDURE — 86850 RBC ANTIBODY SCREEN: CPT

## 2024-12-24 RX ORDER — HYDROCODONE BITARTRATE AND ACETAMINOPHEN 7.5; 325 MG/1; MG/1
1 TABLET ORAL EVERY 6 HOURS PRN
Status: DISCONTINUED | OUTPATIENT
Start: 2024-12-24 | End: 2024-12-24 | Stop reason: HOSPADM

## 2024-12-24 RX ADMIN — HYDROCODONE BITARTRATE AND ACETAMINOPHEN 1 TABLET: 7.5; 325 TABLET ORAL at 16:00

## 2024-12-24 NOTE — DISCHARGE INSTRUCTIONS
Continue home medications as previously prescribed.  Continue with dialysis planned this week.      Follow-up with PCP.      Follow-up with nephrologist.      Return to the ED for any new or worsening symptoms.

## 2024-12-24 NOTE — ED NOTES
Pt to ED via EMS from [facility]. Pt has a hgb of 6.6 and pt O2 on 4L is 91%. Baseline O2 is 4L.

## 2024-12-24 NOTE — ED PROVIDER NOTES
Subjective   History of Present Illness  Patient is a 79-year-old female PMH of anemia of chronic disease presenting to the ED via EMS from [facility] for low hemoglobin noted this morning.  Patient goes to dialysis Monday Wednesday Friday, reports going yesterday with no difficulties.  She states today they took her blood work and it noted a hemoglobin of 6.6 prompting them to send her in.  She reports intermittent dyspnea, and is on 3 to 4 L of oxygen at baseline.  She states she feels slightly dizzy at rest, states it seems like the room is spinning slightly.  She reports slight weakness as well.  She denies any current dyspnea, chest pain, changes in vision, abdominal pain, nausea, vomiting, diarrhea, constipation, dark or bloody stools.        Review of Systems   Constitutional:  Negative for chills and fever.   Respiratory:  Negative for shortness of breath.    Cardiovascular:  Negative for chest pain.   Gastrointestinal:  Negative for abdominal pain, blood in stool, constipation, diarrhea, nausea and vomiting.   Genitourinary:  Negative for dysuria.   Neurological:  Positive for dizziness and weakness.       Past Medical History:   Diagnosis Date    Allergic rhinitis 04/14/2015    Asthma 08/10/2017    Atrial flutter 05/11/2017    Chronic diarrhea 04/15/2019    Chronic hypoxemic respiratory failure 01/18/2024    Chronic pain 02/03/2015    COPD (chronic obstructive pulmonary disease)     COVID-19 virus detected 08/11/2022    Cytokine release syndrome, grade 1 08/16/2022    Edema of both lower extremities due to peripheral venous insufficiency 03/12/2021    ESRD on hemodialysis 05/22/2013    Essential (primary) hypertension 03/03/2022    Fracture of ulnar styloid 05/19/2022    Fracture of unspecified carpal bone, right wrist, subsequent encounter for fracture with routine healing 03/03/2022    Gastroesophageal reflux disease 11/12/2020    Gout 08/10/2017    Hearing loss 08/10/2017    History of  appendectomy     History of DVT (deep vein thrombosis) 11/12/2020    History of kidney transplant 06/30/2017    History of lobectomy of lung 01/18/2024 03/08/2016:  RIGHT Lower Lobe Mass--> Right Video-assisted thoracoscopy with a moderate-to-large wedge resection of the RLL (by Dr. Haris Mcconnell @ [facility])--> Poorly differentiated carcinoma of the RLL.      History of repair of hip joint 05/21/2013    History of suicide attempt 05/20/2024    Hyperlipidemia 08/11/2014    Hypothyroidism, unspecified 03/03/2022    Infection due to extended spectrum beta lactamase (ESBL) producing bacteria 03/11/2016    No A2K system hx. +ESBL E coli urine on 3/11/16.      Irritable bowel syndrome 04/15/2019    Long term (current) use of immunosuppressive biologic 04/28/2021    Long term current use of anticoagulant therapy 01/18/2024    Malignant neoplasm of lung 08/10/2017    Menopausal flushing 08/30/2021    Mitral valve regurgitation 07/06/2015    Mixed anxiety and depressive disorder 04/30/2015    Non-small cell lung cancer 08/10/2017    Nonobstructive atherosclerosis of coronary artery 06/02/2019 08/12/2022: CATH: Prashant: NSTEMI assoc with Covid-19: LM:-nl;  LAD: diffuse, Ca++; 30%; CIRC: Dominant. Normal; RCA: small; 50% diffuse, proximal and mid-segment.      NSTEMI (non-ST elevated myocardial infarction) 08/11/2022    Obsessive-compulsive disorder 04/15/2019    Osteoarthritis 05/20/2024    Paroxysmal atrial fibrillation 05/11/2017    Paroxysmal supraventricular tachycardia 05/11/2017    Peripheral neuropathy 08/11/2014    Personal history of peptic ulcer disease 11/12/2020    Postoperative anemia due to acute blood loss 05/22/2013    Right wrist fracture 03/01/2022    Seasonal allergies 08/10/2017    Secondary hyperparathyroidism of renal origin 09/14/2015    Tricuspid valve regurgitation 03/15/2017    Ulcer of lower extremity 08/30/2021    Unspecified acute appendicitis 03/03/2022    Valvular heart  disease 05/20/2024    Wegener's granulomatosis with renal involvement 03/03/2022       Allergies   Allergen Reactions    Zolpidem Other (See Comments), Unknown (See Comments) and Unknown - High Severity     KEPT HER AWAKE  KEPT HER AWAKE  KEPT HER AWAKE  KEPT HER AWAKE         Past Surgical History:   Procedure Laterality Date    APPENDECTOMY      ARTERIOVENOUS FISTULA/SHUNT SURGERY Right 12/3/2024    Procedure: FISTULOGRAM  and angioplasty RIGHT ARM;  Surgeon: Paulino Alva II, MD;  Location: [facility] HYBRID OR;  Service: Vascular;  Laterality: Right;    BREAST SURGERY Left     cysts rmeoved    CARDIAC CATHETERIZATION Right 08/12/2022    Procedure: Left Heart Cath and coronary angiogram;  Surgeon: Jak Gavin MD;  Location: [facility] CATH INVASIVE LOCATION;  Service: Cardiology;  Laterality: Right;    CLOSED REDUCTION WRIST FRACTURE Right 03/01/2022    Procedure: WRIST CLOSED REDUCTION;  Surgeon: Gaudencio Townsend MD;  Location: [facility] MAIN OR;  Service: Orthopedics;  Laterality: Right;    CYSTOSCOPY      HIP ARTHROPLASTY      HYSTERECTOMY      LUNG LOBECTOMY Right     TRANSPLANTATION RENAL         Family History   Problem Relation Age of Onset    No Known Problems Mother     No Known Problems Father        Social History     Socioeconomic History    Marital status:    Tobacco Use    Smoking status: Former     Passive exposure: Never    Smokeless tobacco: Never   Vaping Use    Vaping status: Former   Substance and Sexual Activity    Alcohol use: Never    Drug use: Never    Sexual activity: Defer           Objective   Physical Exam  Constitutional:       Appearance: Normal appearance.   HENT:      Head: Normocephalic and atraumatic.      Mouth/Throat:      Mouth: Mucous membranes are moist.   Eyes:      Extraocular Movements: Extraocular movements intact.   Cardiovascular:      Rate and Rhythm: Tachycardia present. Rhythm irregular.      Pulses: Normal pulses.      Heart sounds: Normal heart sounds.   Pulmonary:      " Effort: Pulmonary effort is normal.      Breath sounds: Normal breath sounds.   Abdominal:      General: Abdomen is flat. Bowel sounds are normal.      Palpations: Abdomen is soft.      Tenderness: There is no abdominal tenderness.   Musculoskeletal:      Cervical back: Normal range of motion.      Comments: Lower extremities wrapped, reports chronic bilateral lower extremity edema due to kidney failure.   Skin:     General: Skin is warm.      Capillary Refill: Capillary refill takes less than 2 seconds.      Coloration: Skin is pale.   Neurological:      General: No focal deficit present.      Mental Status: She is alert and oriented to person, place, and time.   Psychiatric:         Mood and Affect: Mood normal.         Behavior: Behavior normal.         Procedures           ED Course  ED Course as of 12/24/24 1810   Tue Dec 24, 2024   1505 Spoke with Dr. Alejandra nephrologist who recommended giving patient 1 unit of PRBCs and discharging back to [facility]. [EC]   1613 Patient currently getting blood, resting comfortably. [EC]      ED Course User Index  [EC] Eileen Rapp PA-C      /66   Pulse 107   Temp 99.2 °F (37.3 °C)   Resp 17   Ht 167.6 cm (66\")   Wt 81.6 kg (180 lb)   SpO2 98%   BMI 29.05 kg/m²   Labs Reviewed   COMPREHENSIVE METABOLIC PANEL - Abnormal; Notable for the following components:       Result Value    Glucose 160 (*)     BUN 30 (*)     Creatinine 1.35 (*)     CO2 29.1 (*)     Total Protein 5.3 (*)     Albumin 3.1 (*)     eGFR 40.6 (*)     All other components within normal limits    Narrative:     GFR Categories in Chronic Kidney Disease (CKD)      GFR Category          GFR (mL/min/1.73)    Interpretation  G1                     90 or greater         Normal or high (1)  G2                      60-89                Mild decrease (1)  G3a                   45-59                Mild to moderate decrease  G3b                   30-44                Moderate to severe " decrease  G4                    15-29                Severe decrease  G5                    14 or less           Kidney failure          (1)In the absence of evidence of kidney disease, neither GFR category G1 or G2 fulfill the criteria for CKD.    eGFR calculation 2021 CKD-EPI creatinine equation, which does not include race as a factor   CBC WITH AUTO DIFFERENTIAL - Abnormal; Notable for the following components:    RBC 2.33 (*)     Hemoglobin 7.1 (*)     Hematocrit 24.1 (*)     .4 (*)     MCHC 29.5 (*)     RDW 26.6 (*)     RDW-SD 96.5 (*)     Platelets 95 (*)     Neutrophil % 84.5 (*)     Lymphocyte % 8.1 (*)     Lymphocytes, Absolute 0.53 (*)     All other components within normal limits   TYPE AND SCREEN   PREPARE RBC   CBC AND DIFFERENTIAL    Narrative:     The following orders were created for panel order CBC & Differential.  Procedure                               Abnormality         Status                     ---------                               -----------         ------                     CBC Auto Differential[[id number]]        Abnormal            Final result                 Please view results for these tests on the individual orders.   EXTRA TUBES    Narrative:     The following orders were created for panel order Extra Tubes.  Procedure                               Abnormality         Status                     ---------                               -----------         ------                     Gold Top - SST[[id number]]                                   Final result               Light Blue Top[[id number]]                                   Final result                 Please view results for these tests on the individual orders.   GOLD TOP - SST   LIGHT BLUE TOP     Medications   HYDROcodone-acetaminophen (NORCO) 7.5-325 MG per tablet 1 tablet (1 tablet Oral Given 12/24/24 1600)       No radiology results for the last day                                                   Medical Decision  Making  Chart review: 12/19/24 Dr. Russell hospitalist note: Anemia of chronic disease  -Hemoglobin fluctuating between 7 and 8  -Transfuse as needed for hemoglobin less than 7 or symptomatic anemia  -Continue EPO injections as per renal    Patient presented to the ED for the above complaint.    Patient underwent the above exam and evaluation.    While in the ED patient was placed in gown and IV was established.  EKG, blood work was obtained to assess for anemia, arrhythmia, electrolyte abnormality, dehydration.  Patient reported intermittent dizziness and generalized weakness, patient pale.  Spoke with Dr. Alejandra nephrologist who recommends giving patient 1 unit and she should be cleared for discharge.  Upon reevaluation patient is resting comfortably.  Discussed findings with patient and family at bedside.  Educated her that we will be giving her 1 unit of packed red blood cells and then she will begin to discharge back to [facility].  Patient was given 1 unit of PRBCs, upon reevaluation patient tolerated well, patient visibly less pale, ready to go home.  Educated patient to follow-up with her planned dialysis for the week, follow-up with PCP, follow-up with nephrologist, and strict return precautions were discussed.  Patient and family bedside voiced understanding, agreeable dispo plan.    EKG independently interpreted by Dr. Jaciel Lin showing atrial fibrillation, rate 106, QTc 414, no acute changes compared to previous EKG 11/29/2024 showing A-fib, rate 150.  Labs were independently interpreted by myself and deemed remarkable for the following: WBC 6.54, hemoglobin 7.1, hematocrit 24.1, glucose 160, creatinine 1.35 which is close to baseline, CO2 29.1.  Imaging considered and deemed unnecessary, patient afebrile, nontoxic in appearance, no dyspnea chest pain or productive cough.    Appropriate PPE was worn during each patient encounter.    Discussed this patient with Dr. Lin who agrees with  plan.      Problems Addressed:  Anemia due to chronic kidney disease, unspecified CKD stage: complicated acute illness or injury  Dizziness: complicated acute illness or injury  Generalized weakness: complicated acute illness or injury    Amount and/or Complexity of Data Reviewed  Labs: ordered.  ECG/medicine tests: ordered.    Risk  Prescription drug management.        Final diagnoses:   Anemia due to chronic kidney disease, unspecified CKD stage   Generalized weakness   Dizziness       ED Disposition  ED Disposition       ED Disposition   Discharge    Condition   Stable    Comment   --               Kvng Guillen MD  [address] [id number]  [phone]    Schedule an appointment as soon as possible for a visit            Medication List      No changes were made to your prescriptions during this visit.            Eileen Rapp PA-C  12/24/24 9035

## 2024-12-25 LAB
BH BB BLOOD EXPIRATION DATE: NORMAL
BH BB BLOOD TYPE BARCODE: 9500
BH BB DISPENSE STATUS: NORMAL
BH BB PRODUCT CODE: NORMAL
BH BB UNIT NUMBER: NORMAL
CROSSMATCH INTERPRETATION: NORMAL
UNIT  ABO: NORMAL
UNIT  RH: NORMAL

## 2024-12-26 LAB
QT INTERVAL: 311 MS
QTC INTERVAL: 414 MS

## 2024-12-30 PROBLEM — N18.6 END STAGE RENAL DISEASE: Status: ACTIVE | Noted: 2024-12-30

## 2024-12-30 NOTE — PROGRESS NOTES
"Chief Complaint  No chief complaint on file.    Follow up from fistulagram    Subjective        Jaja Carcamo presents to [facility] VASCULAR SURGERY  History of Present Illness    Patient is a 79 year old lady with ESRD who had previous renal transplant, who had a right arm brachocephalic fistula who was admitted to the hospital on 11/29 with right arm swelling and IJ DVT. On 12/3/24 she underwent a fistulagram with angioplasty of high grade stenosis of the cephalic arch and subclavian vein. She had worsening kidney function and ultimately had to resume dialysis prior to discharge.       She is here for routine follow up after that procedure.     Doing well overall.  Her fistula has been working well for dialysis.  She looks better than she did in the hospital overall.  Mild intermittent right hand pain, no rest pain or ischemic changes in right hand    She has a skin lesion on her right arm that is bothering her pictured below.  She has seen Dr. Antonio previously for skin lesions and had a skin cancer resected previously.     Objective   Vital Signs:  There were no vitals taken for this visit.  Estimated body mass index is 29.05 kg/m² as calculated from the following:    Height as of 12/24/24: 167.6 cm (66\").    Weight as of 12/24/24: 81.6 kg (180 lb).         BMI is >= 25 and <30. (Overweight) The following options were offered after discussion;: information on healthy weight added to patient's after visit summary          Physical Exam   NAD  Respirations unlabored  Right arm fistula dilated and tortuous, palpable thrill that is slightly pulsatile.          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   She had a right IJ DVT at time of her admission but is already on chronic anticoagulation due to history of " atrial flutter.     Her fistula is working well for dialysis.  Recommend follow-up in 3 months with fistula duplex    The patient was under the impression I would be treating her arm mass/lesion.  I am unsure what this is and she has seen dermatology for skin lesions before and states one of them was cancerous.  Will place referral back to dermatology.  She has seen Dr. Antonio. Referral has been placed to him.           Diagnoses and all orders for this visit:    1. End stage renal disease (Primary)    2. Paroxysmal atrial fibrillation    3. History of DVT (deep vein thrombosis)              Patient BMI noted. Educational material for patient for health risks of being overweight added to patient's after visit summary.           Follow Up     No follow-ups on file.  Patient was given instructions and counseling regarding her condition or for health maintenance advice. Please see specific information pulled into the AVS if appropriate.

## 2025-01-02 ENCOUNTER — OFFICE VISIT (OUTPATIENT)
Age: 80
End: 2025-01-02
Payer: MEDICARE

## 2025-01-02 VITALS — HEIGHT: 66 IN | WEIGHT: 180 LBS | BODY MASS INDEX: 28.93 KG/M2

## 2025-01-02 DIAGNOSIS — I48.0 PAROXYSMAL ATRIAL FIBRILLATION: Chronic | ICD-10-CM

## 2025-01-02 DIAGNOSIS — L98.9 ARM SKIN LESION, RIGHT: ICD-10-CM

## 2025-01-02 DIAGNOSIS — Z86.718 HISTORY OF DVT (DEEP VEIN THROMBOSIS): ICD-10-CM

## 2025-01-02 DIAGNOSIS — N18.6 END STAGE RENAL DISEASE: Primary | ICD-10-CM

## 2025-03-01 ENCOUNTER — HOSPITAL ENCOUNTER (EMERGENCY)
Facility: HOSPITAL | Age: 80
Discharge: HOME | DRG: 291 | End: 2025-03-01
Attending: EMERGENCY MEDICINE
Payer: MEDICARE

## 2025-03-01 ENCOUNTER — APPOINTMENT (OUTPATIENT)
Dept: GENERAL RADIOLOGY | Facility: HOSPITAL | Age: 80
DRG: 291 | End: 2025-03-01
Payer: MEDICARE

## 2025-03-01 VITALS
HEART RATE: 99 BPM | BODY MASS INDEX: 28.91 KG/M2 | RESPIRATION RATE: 20 BRPM | OXYGEN SATURATION: 96 % | HEIGHT: 66 IN | DIASTOLIC BLOOD PRESSURE: 64 MMHG | SYSTOLIC BLOOD PRESSURE: 97 MMHG | WEIGHT: 179.9 LBS | TEMPERATURE: 97.8 F

## 2025-03-01 DIAGNOSIS — Z86.59 MENTAL STATUS CHANGE RESOLVED: Primary | ICD-10-CM

## 2025-03-01 DIAGNOSIS — N39.0 URINARY TRACT INFECTION WITHOUT HEMATURIA, SITE UNSPECIFIED: ICD-10-CM

## 2025-03-01 DIAGNOSIS — J06.9 VIRAL URI: ICD-10-CM

## 2025-03-01 LAB
ANION GAP SERPL CALCULATED.3IONS-SCNC: 7.2 MMOL/L (ref 5–15)
B PARAPERT DNA SPEC QL NAA+PROBE: NOT DETECTED
B PERT DNA SPEC QL NAA+PROBE: NOT DETECTED
BACTERIA UR QL AUTO: ABNORMAL /HPF
BASOPHILS # BLD AUTO: 0.06 10*3/MM3 (ref 0–0.2)
BASOPHILS NFR BLD AUTO: 0.6 % (ref 0–1.5)
BILIRUB UR QL STRIP: NEGATIVE
BUN SERPL-MCNC: 37 MG/DL (ref 8–23)
BUN/CREAT SERPL: 28.7 (ref 7–25)
C PNEUM DNA NPH QL NAA+NON-PROBE: NOT DETECTED
CALCIUM SPEC-SCNC: 9.8 MG/DL (ref 8.6–10.5)
CHLORIDE SERPL-SCNC: 99 MMOL/L (ref 98–107)
CLARITY UR: ABNORMAL
CO2 SERPL-SCNC: 30.8 MMOL/L (ref 22–29)
COLOR UR: YELLOW
CREAT SERPL-MCNC: 1.29 MG/DL (ref 0.57–1)
DEPRECATED RDW RBC AUTO: 63.8 FL (ref 37–54)
EGFRCR SERPLBLD CKD-EPI 2021: 42.6 ML/MIN/1.73
EOSINOPHIL # BLD AUTO: 0.38 10*3/MM3 (ref 0–0.4)
EOSINOPHIL NFR BLD AUTO: 3.8 % (ref 0.3–6.2)
ERYTHROCYTE [DISTWIDTH] IN BLOOD BY AUTOMATED COUNT: 16.8 % (ref 12.3–15.4)
FLUAV SUBTYP SPEC NAA+PROBE: NOT DETECTED
FLUBV RNA ISLT QL NAA+PROBE: NOT DETECTED
GLUCOSE SERPL-MCNC: 114 MG/DL (ref 65–99)
GLUCOSE UR STRIP-MCNC: NEGATIVE MG/DL
HADV DNA SPEC NAA+PROBE: NOT DETECTED
HCOV 229E RNA SPEC QL NAA+PROBE: NOT DETECTED
HCOV HKU1 RNA SPEC QL NAA+PROBE: DETECTED
HCOV NL63 RNA SPEC QL NAA+PROBE: NOT DETECTED
HCOV OC43 RNA SPEC QL NAA+PROBE: NOT DETECTED
HCT VFR BLD AUTO: 32.2 % (ref 34–46.6)
HGB BLD-MCNC: 9.1 G/DL (ref 12–15.9)
HGB UR QL STRIP.AUTO: ABNORMAL
HMPV RNA NPH QL NAA+NON-PROBE: NOT DETECTED
HPIV1 RNA ISLT QL NAA+PROBE: NOT DETECTED
HPIV2 RNA SPEC QL NAA+PROBE: NOT DETECTED
HPIV3 RNA NPH QL NAA+PROBE: NOT DETECTED
HPIV4 P GENE NPH QL NAA+PROBE: NOT DETECTED
HYALINE CASTS UR QL AUTO: ABNORMAL /LPF
IMM GRANULOCYTES # BLD AUTO: 0.03 10*3/MM3 (ref 0–0.05)
IMM GRANULOCYTES NFR BLD AUTO: 0.3 % (ref 0–0.5)
KETONES UR QL STRIP: NEGATIVE
LEUKOCYTE ESTERASE UR QL STRIP.AUTO: ABNORMAL
LYMPHOCYTES # BLD AUTO: 1.73 10*3/MM3 (ref 0.7–3.1)
LYMPHOCYTES NFR BLD AUTO: 17.4 % (ref 19.6–45.3)
M PNEUMO IGG SER IA-ACNC: NOT DETECTED
MCH RBC QN AUTO: 29.2 PG (ref 26.6–33)
MCHC RBC AUTO-ENTMCNC: 28.3 G/DL (ref 31.5–35.7)
MCV RBC AUTO: 103.2 FL (ref 79–97)
MONOCYTES # BLD AUTO: 1.3 10*3/MM3 (ref 0.1–0.9)
MONOCYTES NFR BLD AUTO: 13.1 % (ref 5–12)
NEUTROPHILS NFR BLD AUTO: 6.45 10*3/MM3 (ref 1.7–7)
NEUTROPHILS NFR BLD AUTO: 64.8 % (ref 42.7–76)
NITRITE UR QL STRIP: NEGATIVE
NRBC BLD AUTO-RTO: 0 /100 WBC (ref 0–0.2)
PH UR STRIP.AUTO: 7 [PH] (ref 5–8)
PLATELET # BLD AUTO: 185 10*3/MM3 (ref 140–450)
PMV BLD AUTO: 9.2 FL (ref 6–12)
POTASSIUM SERPL-SCNC: 5 MMOL/L (ref 3.5–5.2)
PROT UR QL STRIP: ABNORMAL
RBC # BLD AUTO: 3.12 10*6/MM3 (ref 3.77–5.28)
RBC # UR STRIP: ABNORMAL /HPF
REF LAB TEST METHOD: ABNORMAL
RENAL EPI CELLS #/AREA URNS HPF: ABNORMAL /HPF
RHINOVIRUS RNA SPEC NAA+PROBE: NOT DETECTED
RSV RNA NPH QL NAA+NON-PROBE: NOT DETECTED
SARS-COV-2 RNA RESP QL NAA+PROBE: NOT DETECTED
SODIUM SERPL-SCNC: 137 MMOL/L (ref 136–145)
SP GR UR STRIP: 1.02 (ref 1–1.03)
SQUAMOUS #/AREA URNS HPF: ABNORMAL /HPF
TRANS CELLS #/AREA URNS HPF: ABNORMAL /HPF
UROBILINOGEN UR QL STRIP: ABNORMAL
WBC # UR STRIP: ABNORMAL /HPF
WBC NRBC COR # BLD AUTO: 9.95 10*3/MM3 (ref 3.4–10.8)

## 2025-03-01 PROCEDURE — 99284 EMERGENCY DEPT VISIT MOD MDM: CPT

## 2025-03-01 PROCEDURE — 0202U NFCT DS 22 TRGT SARS-COV-2: CPT | Performed by: EMERGENCY MEDICINE

## 2025-03-01 PROCEDURE — 96365 THER/PROPH/DIAG IV INF INIT: CPT

## 2025-03-01 PROCEDURE — 71045 X-RAY EXAM CHEST 1 VIEW: CPT

## 2025-03-01 PROCEDURE — P9612 CATHETERIZE FOR URINE SPEC: HCPCS

## 2025-03-01 PROCEDURE — 81001 URINALYSIS AUTO W/SCOPE: CPT | Performed by: EMERGENCY MEDICINE

## 2025-03-01 PROCEDURE — 85025 COMPLETE CBC W/AUTO DIFF WBC: CPT | Performed by: EMERGENCY MEDICINE

## 2025-03-01 PROCEDURE — 93005 ELECTROCARDIOGRAM TRACING: CPT | Performed by: EMERGENCY MEDICINE

## 2025-03-01 PROCEDURE — 25010000002 CEFTRIAXONE PER 250 MG: Performed by: EMERGENCY MEDICINE

## 2025-03-01 PROCEDURE — 80048 BASIC METABOLIC PNL TOTAL CA: CPT | Performed by: EMERGENCY MEDICINE

## 2025-03-01 RX ORDER — SODIUM CHLORIDE 0.9 % (FLUSH) 0.9 %
10 SYRINGE (ML) INJECTION AS NEEDED
Status: DISCONTINUED | OUTPATIENT
Start: 2025-03-01 | End: 2025-03-01 | Stop reason: HOSPADM

## 2025-03-01 RX ADMIN — CEFTRIAXONE 2 G: 2 INJECTION, POWDER, FOR SOLUTION INTRAMUSCULAR; INTRAVENOUS at 11:39

## 2025-03-01 NOTE — ED PROVIDER NOTES
Subjective   History of Present Illness  Patient is a 80-year-old female complaining of generalized weakness.  Per her care facility and EMS patient also had some mild confusion.  Family is avocation now and they denied any other complaints.      Review of Systems    Past Medical History:   Diagnosis Date    Allergic rhinitis 04/14/2015    Asthma 08/10/2017    Atrial flutter 05/11/2017    Chronic diarrhea 04/15/2019    Chronic hypoxemic respiratory failure 01/18/2024    Chronic pain 02/03/2015    COPD (chronic obstructive pulmonary disease)     COVID-19 virus detected 08/11/2022    Cytokine release syndrome, grade 1 08/16/2022    Edema of both lower extremities due to peripheral venous insufficiency 03/12/2021    ESRD on hemodialysis 05/22/2013    Essential (primary) hypertension 03/03/2022    Fracture of ulnar styloid 05/19/2022    Fracture of unspecified carpal bone, right wrist, subsequent encounter for fracture with routine healing 03/03/2022    Gastroesophageal reflux disease 11/12/2020    Gout 08/10/2017    Hearing loss 08/10/2017    History of appendectomy     History of DVT (deep vein thrombosis) 11/12/2020    History of kidney transplant 06/30/2017    History of lobectomy of lung 01/18/2024 03/08/2016:  RIGHT Lower Lobe Mass--> Right Video-assisted thoracoscopy with a moderate-to-large wedge resection of the RLL (by Dr. Haris Mcconnell @ [facility])--> Poorly differentiated carcinoma of the RLL.      History of repair of hip joint 05/21/2013    History of suicide attempt 05/20/2024    Hyperlipidemia 08/11/2014    Hypothyroidism, unspecified 03/03/2022    Infection due to extended spectrum beta lactamase (ESBL) producing bacteria 03/11/2016    No A2K system hx. +ESBL E coli urine on 3/11/16.      Irritable bowel syndrome 04/15/2019    Long term (current) use of immunosuppressive biologic 04/28/2021    Long term current use of anticoagulant therapy 01/18/2024    Malignant neoplasm of lung 08/10/2017     Menopausal flushing 08/30/2021    Mitral valve regurgitation 07/06/2015    Mixed anxiety and depressive disorder 04/30/2015    Non-small cell lung cancer 08/10/2017    Nonobstructive atherosclerosis of coronary artery 06/02/2019 08/12/2022: CATH: [facility]: NSTEMI assoc with Covid-19: LM:-nl;  LAD: diffuse, Ca++; 30%; CIRC: Dominant. Normal; RCA: small; 50% diffuse, proximal and mid-segment.      NSTEMI (non-ST elevated myocardial infarction) 08/11/2022    Obsessive-compulsive disorder 04/15/2019    Osteoarthritis 05/20/2024    Paroxysmal atrial fibrillation 05/11/2017    Paroxysmal supraventricular tachycardia 05/11/2017    Peripheral neuropathy 08/11/2014    Personal history of peptic ulcer disease 11/12/2020    Postoperative anemia due to acute blood loss 05/22/2013    Right wrist fracture 03/01/2022    Seasonal allergies 08/10/2017    Secondary hyperparathyroidism of renal origin 09/14/2015    Tricuspid valve regurgitation 03/15/2017    Ulcer of lower extremity 08/30/2021    Unspecified acute appendicitis 03/03/2022    Valvular heart disease 05/20/2024    Wegener's granulomatosis with renal involvement 03/03/2022       Allergies   Allergen Reactions    Zolpidem Other (See Comments), Unknown (See Comments) and Unknown - High Severity     KEPT HER AWAKE  KEPT HER AWAKE  KEPT HER AWAKE  KEPT HER AWAKE         Past Surgical History:   Procedure Laterality Date    APPENDECTOMY      ARTERIOVENOUS FISTULA/SHUNT SURGERY Right 12/3/2024    Procedure: FISTULOGRAM  and angioplasty RIGHT ARM;  Surgeon: Paulino Alva II, MD;  Location: [facility] HYBRID OR;  Service: Vascular;  Laterality: Right;    BREAST SURGERY Left     cysts rmeoved    CARDIAC CATHETERIZATION Right 08/12/2022    Procedure: Left Heart Cath and coronary angiogram;  Surgeon: Jak Gavin MD;  Location: [facility] CATH INVASIVE LOCATION;  Service: Cardiology;  Laterality: Right;    CLOSED REDUCTION WRIST FRACTURE Right 03/01/2022    Procedure: WRIST CLOSED  REDUCTION;  Surgeon: Gaudencio Townsend MD;  Location: [facility] MAIN OR;  Service: Orthopedics;  Laterality: Right;    CYSTOSCOPY      HIP ARTHROPLASTY      HYSTERECTOMY      LUNG LOBECTOMY Right     TRANSPLANTATION RENAL         Family History   Problem Relation Age of Onset    No Known Problems Mother     No Known Problems Father        Social History     Socioeconomic History    Marital status:    Tobacco Use    Smoking status: Former     Passive exposure: Never    Smokeless tobacco: Never   Vaping Use    Vaping status: Former   Substance and Sexual Activity    Alcohol use: Never    Drug use: Never    Sexual activity: Defer           Objective   Physical Exam  Neurologic exam is nonfocal.  Neck has no adenopathy JVD or bruits.  Lungs are clear.  Heart has regular rhythm without murmur rub or gallop.  Chest is nontender.  Abdomen soft.  Extremities I am unremarkable.  Procedures     ED interpretation is normal sinus rhythm at a rate of 110 with no acute ST change      ED Course      Results for orders placed or performed during the hospital encounter of 03/01/25   ECG 12 Lead Altered Mental Status    Collection Time: 03/01/25  9:40 AM   Result Value Ref Range    QT Interval 352 ms    QTC Interval 486 ms   Respiratory Panel PCR w/COVID-19(SARS-CoV-2) HODAN/MARIA M/TWILA/PAD/COR/JENISE In-House, NP Swab in UTM/VTM, 2 HR TAT - Swab, Nasopharynx    Collection Time: 03/01/25 10:01 AM    Specimen: Nasopharynx; Swab   Result Value Ref Range    ADENOVIRUS, PCR Not Detected Not Detected    Coronavirus 229E Not Detected Not Detected    Coronavirus HKU1 Detected (A) Not Detected    Coronavirus NL63 Not Detected Not Detected    Coronavirus OC43 Not Detected Not Detected    COVID19 Not Detected Not Detected - Ref. Range    Human Metapneumovirus Not Detected Not Detected    Human Rhinovirus/Enterovirus Not Detected Not Detected    Influenza A PCR Not Detected Not Detected    Influenza B PCR Not Detected Not Detected    Parainfluenza Virus 1  Not Detected Not Detected    Parainfluenza Virus 2 Not Detected Not Detected    Parainfluenza Virus 3 Not Detected Not Detected    Parainfluenza Virus 4 Not Detected Not Detected    RSV, PCR Not Detected Not Detected    Bordetella pertussis pcr Not Detected Not Detected    Bordetella parapertussis PCR Not Detected Not Detected    Chlamydophila pneumoniae PCR Not Detected Not Detected    Mycoplasma pneumo by PCR Not Detected Not Detected   Basic Metabolic Panel    Collection Time: 03/01/25 10:02 AM    Specimen: Blood   Result Value Ref Range    Glucose 114 (H) 65 - 99 mg/dL    BUN 37 (H) 8 - 23 mg/dL    Creatinine 1.29 (H) 0.57 - 1.00 mg/dL    Sodium 137 136 - 145 mmol/L    Potassium 5.0 3.5 - 5.2 mmol/L    Chloride 99 98 - 107 mmol/L    CO2 30.8 (H) 22.0 - 29.0 mmol/L    Calcium 9.8 8.6 - 10.5 mg/dL    BUN/Creatinine Ratio 28.7 (H) 7.0 - 25.0    Anion Gap 7.2 5.0 - 15.0 mmol/L    eGFR 42.6 (L) >60.0 mL/min/1.73   CBC Auto Differential    Collection Time: 03/01/25 10:02 AM    Specimen: Blood   Result Value Ref Range    WBC 9.95 3.40 - 10.80 10*3/mm3    RBC 3.12 (L) 3.77 - 5.28 10*6/mm3    Hemoglobin 9.1 (L) 12.0 - 15.9 g/dL    Hematocrit 32.2 (L) 34.0 - 46.6 %    .2 (H) 79.0 - 97.0 fL    MCH 29.2 26.6 - 33.0 pg    MCHC 28.3 (L) 31.5 - 35.7 g/dL    RDW 16.8 (H) 12.3 - 15.4 %    RDW-SD 63.8 (H) 37.0 - 54.0 fl    MPV 9.2 6.0 - 12.0 fL    Platelets 185 140 - 450 10*3/mm3    Neutrophil % 64.8 42.7 - 76.0 %    Lymphocyte % 17.4 (L) 19.6 - 45.3 %    Monocyte % 13.1 (H) 5.0 - 12.0 %    Eosinophil % 3.8 0.3 - 6.2 %    Basophil % 0.6 0.0 - 1.5 %    Immature Grans % 0.3 0.0 - 0.5 %    Neutrophils, Absolute 6.45 1.70 - 7.00 10*3/mm3    Lymphocytes, Absolute 1.73 0.70 - 3.10 10*3/mm3    Monocytes, Absolute 1.30 (H) 0.10 - 0.90 10*3/mm3    Eosinophils, Absolute 0.38 0.00 - 0.40 10*3/mm3    Basophils, Absolute 0.06 0.00 - 0.20 10*3/mm3    Immature Grans, Absolute 0.03 0.00 - 0.05 10*3/mm3    nRBC 0.0 0.0 - 0.2 /100 WBC    Urinalysis With Microscopic If Indicated (No Culture) - Urine, Catheter    Collection Time: 03/01/25 10:25 AM    Specimen: Urine, Catheter   Result Value Ref Range    Color, UA Yellow Yellow, Straw    Appearance, UA Turbid (A) Clear    pH, UA 7.0 5.0 - 8.0    Specific Gravity, UA 1.020 1.005 - 1.030    Glucose, UA Negative Negative    Ketones, UA Negative Negative    Bilirubin, UA Negative Negative    Blood, UA Moderate (2+) (A) Negative    Protein, UA >=300 mg/dL (3+) (A) Negative    Leuk Esterase, UA Large (3+) (A) Negative    Nitrite, UA Negative Negative    Urobilinogen, UA 0.2 E.U./dL 0.2 - 1.0 E.U./dL   Urinalysis, Microscopic Only - Urine, Catheter    Collection Time: 03/01/25 10:25 AM    Specimen: Urine, Catheter   Result Value Ref Range    RBC, UA 11-20 (A) None Seen, 0-2 /HPF    WBC, UA Too Numerous to Count (A) None Seen, 0-2 /HPF    Bacteria, UA 1+ (A) None Seen /HPF    Squamous Epithelial Cells, UA Too Numerous to Count (A) None Seen, 0-2 /HPF    Transitional Epithelial Cells, UA 7-12 (A) 0 - 2 /HPF    Renal Epithelial Cells, UA 0-2 0 - 2 /HPF    Hyaline Casts, UA 7-12 None Seen /LPF    Methodology Manual Light Microscopy      XR Chest 1 View    Result Date: 3/1/2025  Impression: 1.Cardiomegaly with mild pulmonary edema. 2.Persistent abnormal densities in the right lower chest probably representing layering pleural effusions with compressive atelectasis and/or airspace disease. Electronically Signed: Carlos Riggins MD  3/1/2025 10:32 AM EST  Workstation ID: [id number]                                                    Medical Decision Making  My chest x-ray interpretation is cardiomegaly without effusion or infiltrate.  UA has 1+ bacteria.  Urine cultures obtained.  CBC shows no leukocytosis no left shift and chronic anemia patient's baseline manage reviewed her old chart.  Rester panel is positive for coronavirus.  CMP shows renal sufficiency at the patient's baseline with no electrolyte abnormality.   Patient was given Rocephin 1 g IV.  She will be discharged with a prescription for Augmentin.  She will see MD for recheck.    Amount and/or Complexity of Data Reviewed  Labs: ordered. Decision-making details documented in ED Course.  Radiology: ordered and independent interpretation performed.  ECG/medicine tests: ordered and independent interpretation performed.    Risk  Prescription drug management.        Final diagnoses:   Mental status change resolved   Urinary tract infection without hematuria, site unspecified   Viral URI       ED Disposition  ED Disposition       ED Disposition   Discharge    Condition   Stable    Comment   --               No follow-up provider specified.       Medication List        New Prescriptions      amoxicillin-clavulanate 875-125 MG per tablet  Commonly known as: AUGMENTIN  Take 1 tablet by mouth 2 (Two) Times a Day.               Where to Get Your Medications        These medications were sent to [facility] - [place], [place] - [address] [place] [address] - [phone]  - [phone] [address] [address]      Phone: [phone]   amoxicillin-clavulanate 875-125 MG per tablet            Lorenzo Morales MD  03/01/25 4815

## 2025-03-01 NOTE — ED NOTES
Called pt's current facility [facility] to receive more information and background on pt hx. Transferred to nursing desk for room 114 with no answer and was not able to leave  due to mailbox being full.

## 2025-03-02 LAB
QT INTERVAL: 352 MS
QTC INTERVAL: 486 MS

## 2025-03-03 ENCOUNTER — HOSPITAL ENCOUNTER (INPATIENT)
Facility: HOSPITAL | Age: 80
LOS: 10 days | Discharge: NURSING, REHAB OR LONG-TERM CARE | DRG: 291 | End: 2025-03-13
Attending: EMERGENCY MEDICINE | Admitting: FAMILY MEDICINE
Payer: MEDICARE

## 2025-03-03 ENCOUNTER — APPOINTMENT (OUTPATIENT)
Dept: GENERAL RADIOLOGY | Facility: HOSPITAL | Age: 80
DRG: 291 | End: 2025-03-03
Payer: MEDICARE

## 2025-03-03 DIAGNOSIS — N17.9 AKI (ACUTE KIDNEY INJURY): Primary | ICD-10-CM

## 2025-03-03 DIAGNOSIS — L89.613 PRESSURE INJURY OF RIGHT HEEL, STAGE 3: ICD-10-CM

## 2025-03-03 DIAGNOSIS — E87.70 HYPERVOLEMIA, UNSPECIFIED HYPERVOLEMIA TYPE: ICD-10-CM

## 2025-03-03 DIAGNOSIS — N39.0 ACUTE UTI: ICD-10-CM

## 2025-03-03 PROBLEM — R60.0 EDEMA OF RIGHT UPPER ARM: Status: ACTIVE | Noted: 2025-03-03

## 2025-03-03 LAB
ALBUMIN SERPL-MCNC: 3.1 G/DL (ref 3.5–5.2)
ALBUMIN/GLOB SERPL: 0.9 G/DL
ALP SERPL-CCNC: 85 U/L (ref 39–117)
ALT SERPL W P-5'-P-CCNC: 5 U/L (ref 1–33)
ANION GAP SERPL CALCULATED.3IONS-SCNC: 7.2 MMOL/L (ref 5–15)
AST SERPL-CCNC: 15 U/L (ref 1–32)
BACTERIA UR QL AUTO: ABNORMAL /HPF
BASOPHILS # BLD AUTO: 0.05 10*3/MM3 (ref 0–0.2)
BASOPHILS NFR BLD AUTO: 0.5 % (ref 0–1.5)
BILIRUB SERPL-MCNC: 0.4 MG/DL (ref 0–1.2)
BILIRUB UR QL STRIP: NEGATIVE
BUN SERPL-MCNC: 48 MG/DL (ref 8–23)
BUN/CREAT SERPL: 23.9 (ref 7–25)
CALCIUM SPEC-SCNC: 9.6 MG/DL (ref 8.6–10.5)
CHLORIDE SERPL-SCNC: 97 MMOL/L (ref 98–107)
CLARITY UR: ABNORMAL
CO2 SERPL-SCNC: 30.8 MMOL/L (ref 22–29)
COLOR UR: ABNORMAL
CREAT SERPL-MCNC: 2.01 MG/DL (ref 0.57–1)
D-LACTATE SERPL-SCNC: 0.6 MMOL/L (ref 0.3–2)
DEPRECATED RDW RBC AUTO: 64.5 FL (ref 37–54)
EGFRCR SERPLBLD CKD-EPI 2021: 25 ML/MIN/1.73
EOSINOPHIL # BLD AUTO: 0.34 10*3/MM3 (ref 0–0.4)
EOSINOPHIL NFR BLD AUTO: 3.5 % (ref 0.3–6.2)
ERYTHROCYTE [DISTWIDTH] IN BLOOD BY AUTOMATED COUNT: 17.1 % (ref 12.3–15.4)
GEN 5 1HR TROPONIN T REFLEX: 49 NG/L
GLOBULIN UR ELPH-MCNC: 3.3 GM/DL
GLUCOSE BLDC GLUCOMTR-MCNC: 124 MG/DL (ref 70–105)
GLUCOSE SERPL-MCNC: 134 MG/DL (ref 65–99)
GLUCOSE UR STRIP-MCNC: NEGATIVE MG/DL
HCT VFR BLD AUTO: 32.8 % (ref 34–46.6)
HGB BLD-MCNC: 9.4 G/DL (ref 12–15.9)
HGB UR QL STRIP.AUTO: ABNORMAL
HOLD SPECIMEN: NORMAL
HYALINE CASTS UR QL AUTO: ABNORMAL /LPF
IMM GRANULOCYTES # BLD AUTO: 0.05 10*3/MM3 (ref 0–0.05)
IMM GRANULOCYTES NFR BLD AUTO: 0.5 % (ref 0–0.5)
KETONES UR QL STRIP: ABNORMAL
LEUKOCYTE ESTERASE UR QL STRIP.AUTO: ABNORMAL
LYMPHOCYTES # BLD AUTO: 1.39 10*3/MM3 (ref 0.7–3.1)
LYMPHOCYTES NFR BLD AUTO: 14.3 % (ref 19.6–45.3)
MCH RBC QN AUTO: 29.7 PG (ref 26.6–33)
MCHC RBC AUTO-ENTMCNC: 28.7 G/DL (ref 31.5–35.7)
MCV RBC AUTO: 103.5 FL (ref 79–97)
MONOCYTES # BLD AUTO: 0.83 10*3/MM3 (ref 0.1–0.9)
MONOCYTES NFR BLD AUTO: 8.5 % (ref 5–12)
NEUTROPHILS NFR BLD AUTO: 7.07 10*3/MM3 (ref 1.7–7)
NEUTROPHILS NFR BLD AUTO: 72.7 % (ref 42.7–76)
NITRITE UR QL STRIP: NEGATIVE
NRBC BLD AUTO-RTO: 0.2 /100 WBC (ref 0–0.2)
NT-PROBNP SERPL-MCNC: ABNORMAL PG/ML (ref 0–1800)
PH UR STRIP.AUTO: 6 [PH] (ref 5–8)
PLATELET # BLD AUTO: 234 10*3/MM3 (ref 140–450)
PMV BLD AUTO: 9 FL (ref 6–12)
POTASSIUM SERPL-SCNC: 5.3 MMOL/L (ref 3.5–5.2)
PROT SERPL-MCNC: 6.4 G/DL (ref 6–8.5)
PROT UR QL STRIP: ABNORMAL
RBC # BLD AUTO: 3.17 10*6/MM3 (ref 3.77–5.28)
RBC # UR STRIP: ABNORMAL /HPF
REF LAB TEST METHOD: ABNORMAL
SODIUM SERPL-SCNC: 135 MMOL/L (ref 136–145)
SP GR UR STRIP: 1.02 (ref 1–1.03)
SQUAMOUS #/AREA URNS HPF: ABNORMAL /HPF
T4 FREE SERPL-MCNC: 0.3 NG/DL (ref 0.93–1.7)
TROPONIN T % DELTA: -9
TROPONIN T NUMERIC DELTA: -5 NG/L
TROPONIN T SERPL HS-MCNC: 54 NG/L
TSH SERPL DL<=0.05 MIU/L-ACNC: 33 UIU/ML (ref 0.27–4.2)
UROBILINOGEN UR QL STRIP: ABNORMAL
WBC # UR STRIP: ABNORMAL /HPF
WBC NRBC COR # BLD AUTO: 9.73 10*3/MM3 (ref 3.4–10.8)
WHOLE BLOOD HOLD COAG: NORMAL
WHOLE BLOOD HOLD SPECIMEN: NORMAL

## 2025-03-03 PROCEDURE — 84439 ASSAY OF FREE THYROXINE: CPT | Performed by: INTERNAL MEDICINE

## 2025-03-03 PROCEDURE — 85025 COMPLETE CBC W/AUTO DIFF WBC: CPT | Performed by: EMERGENCY MEDICINE

## 2025-03-03 PROCEDURE — 87086 URINE CULTURE/COLONY COUNT: CPT | Performed by: EMERGENCY MEDICINE

## 2025-03-03 PROCEDURE — 83605 ASSAY OF LACTIC ACID: CPT

## 2025-03-03 PROCEDURE — 71045 X-RAY EXAM CHEST 1 VIEW: CPT

## 2025-03-03 PROCEDURE — 82948 REAGENT STRIP/BLOOD GLUCOSE: CPT

## 2025-03-03 PROCEDURE — 25010000002 CEFTRIAXONE PER 250 MG: Performed by: EMERGENCY MEDICINE

## 2025-03-03 PROCEDURE — 87040 BLOOD CULTURE FOR BACTERIA: CPT | Performed by: EMERGENCY MEDICINE

## 2025-03-03 PROCEDURE — 36415 COLL VENOUS BLD VENIPUNCTURE: CPT

## 2025-03-03 PROCEDURE — 84484 ASSAY OF TROPONIN QUANT: CPT | Performed by: EMERGENCY MEDICINE

## 2025-03-03 PROCEDURE — P9612 CATHETERIZE FOR URINE SPEC: HCPCS

## 2025-03-03 PROCEDURE — 81001 URINALYSIS AUTO W/SCOPE: CPT | Performed by: EMERGENCY MEDICINE

## 2025-03-03 PROCEDURE — 80053 COMPREHEN METABOLIC PANEL: CPT | Performed by: EMERGENCY MEDICINE

## 2025-03-03 PROCEDURE — 83880 ASSAY OF NATRIURETIC PEPTIDE: CPT | Performed by: EMERGENCY MEDICINE

## 2025-03-03 PROCEDURE — 84443 ASSAY THYROID STIM HORMONE: CPT | Performed by: INTERNAL MEDICINE

## 2025-03-03 PROCEDURE — 93005 ELECTROCARDIOGRAM TRACING: CPT | Performed by: EMERGENCY MEDICINE

## 2025-03-03 PROCEDURE — 99285 EMERGENCY DEPT VISIT HI MDM: CPT

## 2025-03-03 RX ORDER — DULOXETIN HYDROCHLORIDE 20 MG/1
40 CAPSULE, DELAYED RELEASE ORAL DAILY
Status: DISCONTINUED | OUTPATIENT
Start: 2025-03-04 | End: 2025-03-13 | Stop reason: HOSPADM

## 2025-03-03 RX ORDER — LEVOTHYROXINE SODIUM 125 UG/1
125 TABLET ORAL
Status: DISCONTINUED | OUTPATIENT
Start: 2025-03-04 | End: 2025-03-04

## 2025-03-03 RX ORDER — MIDODRINE HYDROCHLORIDE 5 MG/1
10 TABLET ORAL ONCE
Status: COMPLETED | OUTPATIENT
Start: 2025-03-03 | End: 2025-03-03

## 2025-03-03 RX ORDER — MULTIVITAMIN WITH IRON
1000 TABLET ORAL DAILY
Status: DISCONTINUED | OUTPATIENT
Start: 2025-03-04 | End: 2025-03-13 | Stop reason: HOSPADM

## 2025-03-03 RX ORDER — TACROLIMUS 0.5 MG/1
0.5 CAPSULE ORAL 2 TIMES DAILY
Status: DISCONTINUED | OUTPATIENT
Start: 2025-03-03 | End: 2025-03-13 | Stop reason: HOSPADM

## 2025-03-03 RX ORDER — AMOXICILLIN 250 MG
1 CAPSULE ORAL 2 TIMES DAILY
Status: DISCONTINUED | OUTPATIENT
Start: 2025-03-03 | End: 2025-03-13 | Stop reason: HOSPADM

## 2025-03-03 RX ORDER — LEVOTHYROXINE SODIUM 100 UG/1
100 TABLET ORAL
Status: DISCONTINUED | OUTPATIENT
Start: 2025-03-04 | End: 2025-03-03

## 2025-03-03 RX ORDER — MIDODRINE HYDROCHLORIDE 5 MG/1
15 TABLET ORAL
Status: DISCONTINUED | OUTPATIENT
Start: 2025-03-04 | End: 2025-03-04

## 2025-03-03 RX ORDER — MIRTAZAPINE 15 MG/1
15 TABLET, FILM COATED ORAL NIGHTLY
Status: DISCONTINUED | OUTPATIENT
Start: 2025-03-03 | End: 2025-03-13 | Stop reason: HOSPADM

## 2025-03-03 RX ORDER — HYDROXYZINE HYDROCHLORIDE 25 MG/1
25 TABLET, FILM COATED ORAL NIGHTLY PRN
Status: DISCONTINUED | OUTPATIENT
Start: 2025-03-03 | End: 2025-03-07

## 2025-03-03 RX ORDER — CHOLESTYRAMINE LIGHT 4 G/5.7G
1 POWDER, FOR SUSPENSION ORAL DAILY
Status: DISCONTINUED | OUTPATIENT
Start: 2025-03-04 | End: 2025-03-04

## 2025-03-03 RX ORDER — ACETAMINOPHEN 325 MG/1
650 TABLET ORAL EVERY 4 HOURS PRN
Status: DISCONTINUED | OUTPATIENT
Start: 2025-03-03 | End: 2025-03-04 | Stop reason: SDUPTHER

## 2025-03-03 RX ORDER — HYDROXYZINE HYDROCHLORIDE 25 MG/1
25 TABLET, FILM COATED ORAL NIGHTLY
Status: DISCONTINUED | OUTPATIENT
Start: 2025-03-03 | End: 2025-03-03

## 2025-03-03 RX ORDER — QUETIAPINE FUMARATE 25 MG/1
25 TABLET, FILM COATED ORAL NIGHTLY
Status: DISCONTINUED | OUTPATIENT
Start: 2025-03-03 | End: 2025-03-04

## 2025-03-03 RX ORDER — SODIUM CHLORIDE 0.9 % (FLUSH) 0.9 %
10 SYRINGE (ML) INJECTION AS NEEDED
Status: DISCONTINUED | OUTPATIENT
Start: 2025-03-03 | End: 2025-03-13 | Stop reason: HOSPADM

## 2025-03-03 RX ORDER — POLYETHYLENE GLYCOL 3350 17 G/17G
17 POWDER, FOR SOLUTION ORAL DAILY
Status: DISCONTINUED | OUTPATIENT
Start: 2025-03-04 | End: 2025-03-13 | Stop reason: HOSPADM

## 2025-03-03 RX ADMIN — MIDODRINE HYDROCHLORIDE 10 MG: 5 TABLET ORAL at 19:21

## 2025-03-03 RX ADMIN — CEFTRIAXONE 2000 MG: 2 INJECTION, POWDER, FOR SOLUTION INTRAMUSCULAR; INTRAVENOUS at 19:21

## 2025-03-03 NOTE — Clinical Note
Level of Care: Progressive Care [20]   Admitting Physician: KAREN HANNA [[id number]]   Attending Physician: KAREN HANNA [[id number]]

## 2025-03-03 NOTE — LETTER
EMS Transport Request  For use at [facility], [place], [place], [place], and [place] only   Patient Name: Jaja Carcamo : 1945   Weight:75.3 kg (166 lb) Pick-up Location: [facility] BLS/ALS: BLS/ALS: BLS   Insurance: [employer] MEDICARE REPLACEMENT Auth End Date:      Pre-Cert #: D/C Summary complete:    Destination: Other [facility].  Room 114   Contact Precautions: Other Contact   Equipment (O2, Fluids, etc.): O2, settings 6L   Arrive By Date/Time: 3/13/25 Stretcher/WC: Stretcher   CM Requesting: Claire Castro RN Ext: [phone]   Notes/Medical Necessity: Dependent/roma lift with transfers.  Out of Hospital DNR signed.      ______________________________________________________________________    *Only 2 patient bags OR 1 carry-on size bag are permitted.  Wheelchairs and walkers CANNOT transported with the patient. Acknowledge: Yes

## 2025-03-04 ENCOUNTER — APPOINTMENT (OUTPATIENT)
Dept: NUCLEAR MEDICINE | Facility: HOSPITAL | Age: 80
DRG: 291 | End: 2025-03-04
Payer: MEDICARE

## 2025-03-04 ENCOUNTER — APPOINTMENT (OUTPATIENT)
Dept: CARDIOLOGY | Facility: HOSPITAL | Age: 80
DRG: 291 | End: 2025-03-04
Payer: MEDICARE

## 2025-03-04 PROBLEM — N17.9 AKI (ACUTE KIDNEY INJURY): Status: ACTIVE | Noted: 2025-03-04

## 2025-03-04 LAB
ALBUMIN SERPL-MCNC: 3.7 G/DL (ref 3.5–5.2)
ALBUMIN/GLOB SERPL: 1.5 G/DL
ALP SERPL-CCNC: 67 U/L (ref 39–117)
ALT SERPL W P-5'-P-CCNC: 5 U/L (ref 1–33)
AMMONIA BLD-SCNC: 22 UMOL/L (ref 11–51)
ANION GAP SERPL CALCULATED.3IONS-SCNC: 8.7 MMOL/L (ref 5–15)
ANION GAP SERPL CALCULATED.3IONS-SCNC: 9.1 MMOL/L (ref 5–15)
ARTERIAL PATENCY WRIST A: POSITIVE
AST SERPL-CCNC: 18 U/L (ref 1–32)
ATMOSPHERIC PRESS: ABNORMAL MM[HG]
BASE EXCESS BLDA CALC-SCNC: 5.2 MMOL/L (ref 0–3)
BASE EXCESS BLDV CALC-SCNC: 3.7 MMOL/L (ref -2–2)
BASE EXCESS BLDV CALC-SCNC: 5.3 MMOL/L (ref -2–2)
BASOPHILS # BLD AUTO: 0.05 10*3/MM3 (ref 0–0.2)
BASOPHILS NFR BLD AUTO: 0.6 % (ref 0–1.5)
BDY SITE: ABNORMAL
BH CV UPPER VENOUS LEFT INTERNAL JUGULAR AUGMENT: NORMAL
BH CV UPPER VENOUS LEFT INTERNAL JUGULAR COMPRESS: NORMAL
BH CV UPPER VENOUS LEFT INTERNAL JUGULAR PHASIC: NORMAL
BH CV UPPER VENOUS LEFT INTERNAL JUGULAR SPONT: NORMAL
BH CV UPPER VENOUS LEFT SUBCLAVIAN AUGMENT: NORMAL
BH CV UPPER VENOUS LEFT SUBCLAVIAN COMPRESS: NORMAL
BH CV UPPER VENOUS LEFT SUBCLAVIAN PHASIC: NORMAL
BH CV UPPER VENOUS LEFT SUBCLAVIAN SPONT: NORMAL
BH CV UPPER VENOUS RIGHT AXILLARY AUGMENT: NORMAL
BH CV UPPER VENOUS RIGHT AXILLARY COMPRESS: NORMAL
BH CV UPPER VENOUS RIGHT AXILLARY PHASIC: NORMAL
BH CV UPPER VENOUS RIGHT AXILLARY SPONT: NORMAL
BH CV UPPER VENOUS RIGHT BASILIC FOREARM COMPRESS: NORMAL
BH CV UPPER VENOUS RIGHT BASILIC UPPER COMPRESS: NORMAL
BH CV UPPER VENOUS RIGHT BRACHIAL COMPRESS: NORMAL
BH CV UPPER VENOUS RIGHT CEPHALIC FOREARM COMPRESS: NORMAL
BH CV UPPER VENOUS RIGHT CEPHALIC UPPER SPONT: NORMAL
BH CV UPPER VENOUS RIGHT INTERNAL JUGULAR AUGMENT: NORMAL
BH CV UPPER VENOUS RIGHT INTERNAL JUGULAR COMPRESS: NORMAL
BH CV UPPER VENOUS RIGHT INTERNAL JUGULAR PHASIC: NORMAL
BH CV UPPER VENOUS RIGHT INTERNAL JUGULAR SPONT: NORMAL
BH CV UPPER VENOUS RIGHT RADIAL COMPRESS: NORMAL
BH CV UPPER VENOUS RIGHT SUBCLAVIAN AUGMENT: NORMAL
BH CV UPPER VENOUS RIGHT SUBCLAVIAN COMPRESS: NORMAL
BH CV UPPER VENOUS RIGHT SUBCLAVIAN PHASIC: NORMAL
BH CV UPPER VENOUS RIGHT SUBCLAVIAN SPONT: NORMAL
BH CV UPPER VENOUS RIGHT ULNAR COMPRESS: NORMAL
BILIRUB SERPL-MCNC: 0.4 MG/DL (ref 0–1.2)
BUN SERPL-MCNC: 48 MG/DL (ref 8–23)
BUN SERPL-MCNC: 48 MG/DL (ref 8–23)
BUN/CREAT SERPL: 25.7 (ref 7–25)
BUN/CREAT SERPL: 26.1 (ref 7–25)
CA-I BLDA-SCNC: 1.39 MMOL/L (ref 1.15–1.33)
CALCIUM SPEC-SCNC: 9.2 MG/DL (ref 8.6–10.5)
CALCIUM SPEC-SCNC: 9.5 MG/DL (ref 8.6–10.5)
CHLORIDE SERPL-SCNC: 103 MMOL/L (ref 98–107)
CHLORIDE SERPL-SCNC: 104 MMOL/L (ref 98–107)
CHOLEST SERPL-MCNC: 82 MG/DL (ref 0–200)
CO2 BLDA-SCNC: 35.5 MMOL/L (ref 22–29)
CO2 BLDA-SCNC: 36 MMOL/L (ref 22–29)
CO2 SERPL-SCNC: 28.3 MMOL/L (ref 22–29)
CO2 SERPL-SCNC: 29.9 MMOL/L (ref 22–29)
CORTIS SERPL-MCNC: 1.83 MCG/DL
CREAT BLDA-MCNC: 2.07 MG/DL (ref 0.6–1.3)
CREAT SERPL-MCNC: 1.84 MG/DL (ref 0.57–1)
CREAT SERPL-MCNC: 1.87 MG/DL (ref 0.57–1)
D DIMER PPP FEU-MCNC: >20 MCGFEU/ML (ref 0–0.78)
D-LACTATE SERPL-SCNC: 1 MMOL/L (ref 0.2–2)
DEPRECATED RDW RBC AUTO: 64.2 FL (ref 37–54)
EGFRCR SERPLBLD CKD-EPI 2021: 24.1 ML/MIN/1.73
EGFRCR SERPLBLD CKD-EPI 2021: 27.3 ML/MIN/1.73
EGFRCR SERPLBLD CKD-EPI 2021: 27.8 ML/MIN/1.73
EOSINOPHIL # BLD AUTO: 0.27 10*3/MM3 (ref 0–0.4)
EOSINOPHIL NFR BLD AUTO: 3.3 % (ref 0.3–6.2)
ERYTHROCYTE [DISTWIDTH] IN BLOOD BY AUTOMATED COUNT: 17.2 % (ref 12.3–15.4)
GLOBULIN UR ELPH-MCNC: 2.5 GM/DL
GLUCOSE BLDC GLUCOMTR-MCNC: 141 MG/DL (ref 74–100)
GLUCOSE BLDC GLUCOMTR-MCNC: 141 MG/DL (ref 74–100)
GLUCOSE SERPL-MCNC: 120 MG/DL (ref 65–99)
GLUCOSE SERPL-MCNC: 128 MG/DL (ref 65–99)
HBA1C MFR BLD: 5.06 % (ref 4.8–5.6)
HCO3 BLDA-SCNC: 33.3 MMOL/L (ref 21–28)
HCO3 BLDV-SCNC: 33.1 MMOL/L (ref 22–26)
HCO3 BLDV-SCNC: 33.8 MMOL/L (ref 22–26)
HCT VFR BLD AUTO: 31.5 % (ref 34–46.6)
HCT VFR BLDA CALC: 29 % (ref 38–51)
HDLC SERPL-MCNC: 20 MG/DL (ref 40–60)
HEMODILUTION: NO
HGB BLD-MCNC: 9.1 G/DL (ref 12–15.9)
HGB BLDA-MCNC: 10 G/DL (ref 12–17)
IMM GRANULOCYTES # BLD AUTO: 0.04 10*3/MM3 (ref 0–0.05)
IMM GRANULOCYTES NFR BLD AUTO: 0.5 % (ref 0–0.5)
INHALED O2 CONCENTRATION: 40 %
INHALED O2 CONCENTRATION: 60 %
INHALED O2 CONCENTRATION: 60 %
LDLC SERPL CALC-MCNC: 42 MG/DL (ref 0–100)
LDLC/HDLC SERPL: 2.06 {RATIO}
LYMPHOCYTES # BLD AUTO: 1.25 10*3/MM3 (ref 0.7–3.1)
LYMPHOCYTES NFR BLD AUTO: 15.3 % (ref 19.6–45.3)
MAGNESIUM SERPL-MCNC: 2 MG/DL (ref 1.6–2.4)
MAGNESIUM SERPL-MCNC: 2 MG/DL (ref 1.6–2.4)
MCH RBC QN AUTO: 29.8 PG (ref 26.6–33)
MCHC RBC AUTO-ENTMCNC: 28.9 G/DL (ref 31.5–35.7)
MCV RBC AUTO: 103.3 FL (ref 79–97)
MODALITY: ABNORMAL
MONOCYTES # BLD AUTO: 0.91 10*3/MM3 (ref 0.1–0.9)
MONOCYTES NFR BLD AUTO: 11.1 % (ref 5–12)
MRSA DNA SPEC QL NAA+PROBE: NORMAL
NEUTROPHILS NFR BLD AUTO: 5.66 10*3/MM3 (ref 1.7–7)
NEUTROPHILS NFR BLD AUTO: 69.2 % (ref 42.7–76)
NOTIFIED WHO: ABNORMAL
NRBC BLD AUTO-RTO: 0.4 /100 WBC (ref 0–0.2)
PCO2 BLDA: 69 MM HG (ref 35–48)
PCO2 BLDV: 73.8 MM HG (ref 42–51)
PCO2 BLDV: 78.6 MM HG (ref 42–51)
PH BLDA: 7.29 PH UNITS (ref 7.35–7.45)
PH BLDV: 7.23 PH UNITS (ref 7.32–7.43)
PH BLDV: 7.27 PH UNITS (ref 7.32–7.43)
PHOSPHATE SERPL-MCNC: 4.6 MG/DL (ref 2.5–4.5)
PLATELET # BLD AUTO: 204 10*3/MM3 (ref 140–450)
PMV BLD AUTO: 9.2 FL (ref 6–12)
PO2 BLD: 205 MM[HG] (ref 0–500)
PO2 BLDA: 82 MM HG (ref 83–108)
PO2 BLDV: 21.2 MM HG (ref 40–42)
PO2 BLDV: 27.6 MM HG (ref 40–42)
POTASSIUM BLDA-SCNC: 4.8 MMOL/L (ref 3.5–4.5)
POTASSIUM SERPL-SCNC: 4.8 MMOL/L (ref 3.5–5.2)
POTASSIUM SERPL-SCNC: 4.9 MMOL/L (ref 3.5–5.2)
PROT SERPL-MCNC: 6.2 G/DL (ref 6–8.5)
QT INTERVAL: 346 MS
QTC INTERVAL: 437 MS
RBC # BLD AUTO: 3.05 10*6/MM3 (ref 3.77–5.28)
SAO2 % BLDCOA: 94 % (ref 94–98)
SAO2 % BLDCOV: 24.7 % (ref 45–75)
SAO2 % BLDCOV: 40.7 % (ref 45–75)
SODIUM BLD-SCNC: 138 MMOL/L (ref 138–146)
SODIUM SERPL-SCNC: 140 MMOL/L (ref 136–145)
SODIUM SERPL-SCNC: 143 MMOL/L (ref 136–145)
TRIGL SERPL-MCNC: 104 MG/DL (ref 0–150)
VLDLC SERPL-MCNC: 20 MG/DL (ref 5–40)
WBC NRBC COR # BLD AUTO: 8.18 10*3/MM3 (ref 3.4–10.8)

## 2025-03-04 PROCEDURE — 85018 HEMOGLOBIN: CPT

## 2025-03-04 PROCEDURE — A9538 TC99M PYROPHOSPHATE: HCPCS | Performed by: INTERNAL MEDICINE

## 2025-03-04 PROCEDURE — 85379 FIBRIN DEGRADATION QUANT: CPT

## 2025-03-04 PROCEDURE — 80061 LIPID PANEL: CPT

## 2025-03-04 PROCEDURE — 82565 ASSAY OF CREATININE: CPT

## 2025-03-04 PROCEDURE — 63710000001 TACROLIMUS PER 1 MG: Performed by: INTERNAL MEDICINE

## 2025-03-04 PROCEDURE — 83036 HEMOGLOBIN GLYCOSYLATED A1C: CPT

## 2025-03-04 PROCEDURE — 83605 ASSAY OF LACTIC ACID: CPT

## 2025-03-04 PROCEDURE — P9041 ALBUMIN (HUMAN),5%, 50ML: HCPCS

## 2025-03-04 PROCEDURE — 82533 TOTAL CORTISOL: CPT

## 2025-03-04 PROCEDURE — 83735 ASSAY OF MAGNESIUM: CPT

## 2025-03-04 PROCEDURE — 93971 EXTREMITY STUDY: CPT

## 2025-03-04 PROCEDURE — 82948 REAGENT STRIP/BLOOD GLUCOSE: CPT

## 2025-03-04 PROCEDURE — 82140 ASSAY OF AMMONIA: CPT

## 2025-03-04 PROCEDURE — 25010000002 ALBUMIN HUMAN 5% PER 50 ML

## 2025-03-04 PROCEDURE — 82803 BLOOD GASES ANY COMBINATION: CPT

## 2025-03-04 PROCEDURE — 25010000002 CEFTRIAXONE PER 250 MG

## 2025-03-04 PROCEDURE — 84100 ASSAY OF PHOSPHORUS: CPT

## 2025-03-04 PROCEDURE — 25010000002 FUROSEMIDE PER 20 MG

## 2025-03-04 PROCEDURE — 34310000005 TECHNETIUM ALBUMIN AGGREGATED: Performed by: INTERNAL MEDICINE

## 2025-03-04 PROCEDURE — 94761 N-INVAS EAR/PLS OXIMETRY MLT: CPT

## 2025-03-04 PROCEDURE — A9540 TC99M MAA: HCPCS | Performed by: INTERNAL MEDICINE

## 2025-03-04 PROCEDURE — 94799 UNLISTED PULMONARY SVC/PX: CPT

## 2025-03-04 PROCEDURE — 87481 CANDIDA DNA AMP PROBE: CPT | Performed by: INTERNAL MEDICINE

## 2025-03-04 PROCEDURE — 82330 ASSAY OF CALCIUM: CPT

## 2025-03-04 PROCEDURE — 85025 COMPLETE CBC W/AUTO DIFF WBC: CPT

## 2025-03-04 PROCEDURE — 94660 CPAP INITIATION&MGMT: CPT

## 2025-03-04 PROCEDURE — P9047 ALBUMIN (HUMAN), 25%, 50ML: HCPCS

## 2025-03-04 PROCEDURE — 80051 ELECTROLYTE PANEL: CPT

## 2025-03-04 PROCEDURE — 87641 MR-STAPH DNA AMP PROBE: CPT

## 2025-03-04 PROCEDURE — 80197 ASSAY OF TACROLIMUS: CPT

## 2025-03-04 PROCEDURE — 80053 COMPREHEN METABOLIC PANEL: CPT

## 2025-03-04 PROCEDURE — 36600 WITHDRAWAL OF ARTERIAL BLOOD: CPT

## 2025-03-04 PROCEDURE — 25010000002 ALBUMIN HUMAN 25% PER 50 ML

## 2025-03-04 PROCEDURE — 93971 EXTREMITY STUDY: CPT | Performed by: SURGERY

## 2025-03-04 PROCEDURE — 34310000005 TECHNETIUM TC99M PYROPHOSPHATE: Performed by: INTERNAL MEDICINE

## 2025-03-04 PROCEDURE — 78582 LUNG VENTILAT&PERFUS IMAGING: CPT

## 2025-03-04 RX ORDER — MIDODRINE HYDROCHLORIDE 5 MG/1
5 TABLET ORAL ONCE
Status: COMPLETED | OUTPATIENT
Start: 2025-03-04 | End: 2025-03-04

## 2025-03-04 RX ORDER — FERROUS SULFATE 325(65) MG
325 TABLET ORAL 3 TIMES DAILY
COMMUNITY
End: 2025-03-13 | Stop reason: HOSPADM

## 2025-03-04 RX ORDER — BISACODYL 5 MG/1
5 TABLET, DELAYED RELEASE ORAL DAILY PRN
Status: DISCONTINUED | OUTPATIENT
Start: 2025-03-04 | End: 2025-03-13 | Stop reason: HOSPADM

## 2025-03-04 RX ORDER — HYDROCODONE BITARTRATE AND ACETAMINOPHEN 10; 325 MG/1; MG/1
1 TABLET ORAL EVERY 6 HOURS
Status: ON HOLD | COMMUNITY
End: 2025-03-13

## 2025-03-04 RX ORDER — FUROSEMIDE 10 MG/ML
60 INJECTION INTRAMUSCULAR; INTRAVENOUS ONCE
Status: DISCONTINUED | OUTPATIENT
Start: 2025-03-04 | End: 2025-03-05

## 2025-03-04 RX ORDER — POLYETHYLENE GLYCOL 3350 17 G/17G
17 POWDER, FOR SOLUTION ORAL DAILY PRN
Status: DISCONTINUED | OUTPATIENT
Start: 2025-03-04 | End: 2025-03-13 | Stop reason: HOSPADM

## 2025-03-04 RX ORDER — SODIUM CHLORIDE 9 MG/ML
40 INJECTION, SOLUTION INTRAVENOUS AS NEEDED
Status: DISCONTINUED | OUTPATIENT
Start: 2025-03-04 | End: 2025-03-13 | Stop reason: HOSPADM

## 2025-03-04 RX ORDER — SODIUM CHLORIDE 0.9 % (FLUSH) 0.9 %
10 SYRINGE (ML) INJECTION AS NEEDED
Status: DISCONTINUED | OUTPATIENT
Start: 2025-03-04 | End: 2025-03-13 | Stop reason: HOSPADM

## 2025-03-04 RX ORDER — ACETAMINOPHEN 650 MG/1
650 SUPPOSITORY RECTAL EVERY 4 HOURS PRN
Status: DISCONTINUED | OUTPATIENT
Start: 2025-03-04 | End: 2025-03-13 | Stop reason: HOSPADM

## 2025-03-04 RX ORDER — ONDANSETRON 4 MG/1
4 TABLET, ORALLY DISINTEGRATING ORAL EVERY 6 HOURS PRN
Status: DISCONTINUED | OUTPATIENT
Start: 2025-03-04 | End: 2025-03-13 | Stop reason: HOSPADM

## 2025-03-04 RX ORDER — ALUMINA, MAGNESIA, AND SIMETHICONE 2400; 2400; 240 MG/30ML; MG/30ML; MG/30ML
15 SUSPENSION ORAL EVERY 6 HOURS PRN
Status: DISCONTINUED | OUTPATIENT
Start: 2025-03-04 | End: 2025-03-13 | Stop reason: HOSPADM

## 2025-03-04 RX ORDER — MIDODRINE HYDROCHLORIDE 5 MG/1
15 TABLET ORAL EVERY 8 HOURS SCHEDULED
Status: DISCONTINUED | OUTPATIENT
Start: 2025-03-04 | End: 2025-03-13 | Stop reason: HOSPADM

## 2025-03-04 RX ORDER — ONDANSETRON 2 MG/ML
4 INJECTION INTRAMUSCULAR; INTRAVENOUS EVERY 6 HOURS PRN
Status: DISCONTINUED | OUTPATIENT
Start: 2025-03-04 | End: 2025-03-13 | Stop reason: HOSPADM

## 2025-03-04 RX ORDER — SODIUM CHLORIDE 0.9 % (FLUSH) 0.9 %
10 SYRINGE (ML) INJECTION EVERY 12 HOURS SCHEDULED
Status: DISCONTINUED | OUTPATIENT
Start: 2025-03-04 | End: 2025-03-13 | Stop reason: HOSPADM

## 2025-03-04 RX ORDER — AMOXICILLIN 250 MG
2 CAPSULE ORAL 2 TIMES DAILY PRN
Status: DISCONTINUED | OUTPATIENT
Start: 2025-03-04 | End: 2025-03-13 | Stop reason: HOSPADM

## 2025-03-04 RX ORDER — NOREPINEPHRINE BITARTRATE 0.03 MG/ML
.02-.5 INJECTION, SOLUTION INTRAVENOUS
Status: DISCONTINUED | OUTPATIENT
Start: 2025-03-04 | End: 2025-03-04

## 2025-03-04 RX ORDER — NITROGLYCERIN 0.4 MG/1
0.4 TABLET SUBLINGUAL
Status: DISCONTINUED | OUTPATIENT
Start: 2025-03-04 | End: 2025-03-13 | Stop reason: HOSPADM

## 2025-03-04 RX ORDER — ALBUMIN (HUMAN) 12.5 G/50ML
50 SOLUTION INTRAVENOUS ONCE
Status: COMPLETED | OUTPATIENT
Start: 2025-03-04 | End: 2025-03-04

## 2025-03-04 RX ORDER — FUROSEMIDE 40 MG/1
40 TABLET ORAL 2 TIMES DAILY
COMMUNITY
End: 2025-03-13 | Stop reason: HOSPADM

## 2025-03-04 RX ORDER — LORAZEPAM 0.5 MG/1
0.5 TABLET ORAL EVERY 6 HOURS
COMMUNITY
End: 2025-03-13 | Stop reason: HOSPADM

## 2025-03-04 RX ORDER — ACETAMINOPHEN 325 MG/1
650 TABLET ORAL EVERY 4 HOURS PRN
Status: DISCONTINUED | OUTPATIENT
Start: 2025-03-04 | End: 2025-03-04 | Stop reason: SDUPTHER

## 2025-03-04 RX ORDER — LORAZEPAM 0.5 MG/1
0.5 TABLET ORAL EVERY 6 HOURS
Status: DISCONTINUED | OUTPATIENT
Start: 2025-03-04 | End: 2025-03-04

## 2025-03-04 RX ORDER — ALBUMIN HUMAN 50 G/1000ML
12.5 SOLUTION INTRAVENOUS ONCE
Status: COMPLETED | OUTPATIENT
Start: 2025-03-04 | End: 2025-03-04

## 2025-03-04 RX ORDER — BISACODYL 10 MG
10 SUPPOSITORY, RECTAL RECTAL DAILY PRN
Status: DISCONTINUED | OUTPATIENT
Start: 2025-03-04 | End: 2025-03-13 | Stop reason: HOSPADM

## 2025-03-04 RX ORDER — PROCHLORPERAZINE EDISYLATE 5 MG/ML
2.5 INJECTION INTRAMUSCULAR; INTRAVENOUS EVERY 4 HOURS PRN
Status: DISCONTINUED | OUTPATIENT
Start: 2025-03-04 | End: 2025-03-13 | Stop reason: HOSPADM

## 2025-03-04 RX ORDER — LORAZEPAM 0.5 MG/1
0.5 TABLET ORAL EVERY 6 HOURS PRN
Status: DISCONTINUED | OUTPATIENT
Start: 2025-03-04 | End: 2025-03-13 | Stop reason: HOSPADM

## 2025-03-04 RX ORDER — PANTOPRAZOLE SODIUM 40 MG/1
40 TABLET, DELAYED RELEASE ORAL
Status: DISCONTINUED | OUTPATIENT
Start: 2025-03-04 | End: 2025-03-05

## 2025-03-04 RX ORDER — ACETAMINOPHEN 325 MG/1
650 TABLET ORAL EVERY 4 HOURS PRN
Status: DISCONTINUED | OUTPATIENT
Start: 2025-03-04 | End: 2025-03-13 | Stop reason: HOSPADM

## 2025-03-04 RX ORDER — HYDROCODONE BITARTRATE AND ACETAMINOPHEN 10; 325 MG/1; MG/1
1 TABLET ORAL EVERY 6 HOURS
Status: DISPENSED | OUTPATIENT
Start: 2025-03-04 | End: 2025-03-09

## 2025-03-04 RX ORDER — MIDODRINE HYDROCHLORIDE 5 MG/1
15 TABLET ORAL
COMMUNITY
End: 2025-03-13 | Stop reason: HOSPADM

## 2025-03-04 RX ORDER — FUROSEMIDE 10 MG/ML
40 INJECTION INTRAMUSCULAR; INTRAVENOUS ONCE
Status: COMPLETED | OUTPATIENT
Start: 2025-03-04 | End: 2025-03-04

## 2025-03-04 RX ORDER — METOPROLOL TARTRATE 25 MG/1
0.5 TABLET, FILM COATED ORAL 2 TIMES DAILY
COMMUNITY
End: 2025-03-13 | Stop reason: HOSPADM

## 2025-03-04 RX ADMIN — Medication 10 ML: at 01:06

## 2025-03-04 RX ADMIN — KIT FOR THE PREPARATION OF TECHNETIUM TC 99M ALBUMIN AGGREGATED 1 DOSE: 2.5 INJECTION, POWDER, FOR SOLUTION INTRAVENOUS at 12:23

## 2025-03-04 RX ADMIN — MIRTAZAPINE 15 MG: 15 TABLET, FILM COATED ORAL at 21:03

## 2025-03-04 RX ADMIN — Medication 10 ML: at 21:03

## 2025-03-04 RX ADMIN — ALBUMIN (HUMAN) 50 G: 0.25 INJECTION, SOLUTION INTRAVENOUS at 02:40

## 2025-03-04 RX ADMIN — ALBUMIN (HUMAN) 12.5 G: 12.5 INJECTION, SOLUTION INTRAVENOUS at 01:04

## 2025-03-04 RX ADMIN — HYDROCODONE BITARTRATE AND ACETAMINOPHEN 1 TABLET: 10; 325 TABLET ORAL at 14:02

## 2025-03-04 RX ADMIN — LEVOTHYROXINE SODIUM 125 MCG: 0.03 TABLET ORAL at 03:43

## 2025-03-04 RX ADMIN — CEFTRIAXONE SODIUM 1000 MG: 1 INJECTION, POWDER, FOR SOLUTION INTRAMUSCULAR; INTRAVENOUS at 17:21

## 2025-03-04 RX ADMIN — MUPIROCIN 1 APPLICATION: 20 OINTMENT TOPICAL at 05:47

## 2025-03-04 RX ADMIN — MIDODRINE HYDROCHLORIDE 15 MG: 5 TABLET ORAL at 21:03

## 2025-03-04 RX ADMIN — DULOXETINE 40 MG: 20 CAPSULE, DELAYED RELEASE ORAL at 14:02

## 2025-03-04 RX ADMIN — FUROSEMIDE 40 MG: 10 INJECTION, SOLUTION INTRAMUSCULAR; INTRAVENOUS at 01:06

## 2025-03-04 RX ADMIN — Medication 12.5 MG: at 21:03

## 2025-03-04 RX ADMIN — TACROLIMUS 0.5 MG: 0.5 CAPSULE ORAL at 21:03

## 2025-03-04 RX ADMIN — TECHNETIUM TC99M PYROPHOSPHATE 1 DOSE: 12 INJECTION INTRAVENOUS at 12:23

## 2025-03-04 RX ADMIN — MIDODRINE HYDROCHLORIDE 5 MG: 5 TABLET ORAL at 02:40

## 2025-03-04 RX ADMIN — Medication 10 ML: at 09:00

## 2025-03-04 RX ADMIN — MIDODRINE HYDROCHLORIDE 15 MG: 5 TABLET ORAL at 05:47

## 2025-03-04 RX ADMIN — MIDODRINE HYDROCHLORIDE 15 MG: 5 TABLET ORAL at 14:01

## 2025-03-04 RX ADMIN — MUPIROCIN 1 APPLICATION: 20 OINTMENT TOPICAL at 21:03

## 2025-03-04 RX ADMIN — APIXABAN 5 MG: 5 TABLET, FILM COATED ORAL at 21:03

## 2025-03-04 RX ADMIN — PANTOPRAZOLE SODIUM 40 MG: 40 TABLET, DELAYED RELEASE ORAL at 05:47

## 2025-03-04 NOTE — DISCHARGE PLACEMENT REQUEST
"Gene Carcamo (80 y.o. Female)       Date of Birth   1945    Social Security Number       Address   [address] [place] [address] [place] [facility]    Home Phone   [phone]    MRN   [id number]       Judaism   None    Marital Status                               Admission Date   3/3/25    Admission Type   Emergency    Admitting Provider   Nilsa Lomeli MD    Attending Provider   Zach Brandon DO    Department, Room/Bed   [facility] INTENSIVE CARE UNIT, 2307/1       Discharge Date       Discharge Disposition       Discharge Destination                                 Attending Provider: Zach Brandon DO    Allergies: Zolpidem    Isolation: Contact   Infection: ESBL (06/07/24), ESBL E coli (06/07/24), Candida Auris (rule out) (03/03/25)   Code Status: No CPR    Ht: 167.6 cm (66\")   Wt: 82.2 kg (181 lb 3.5 oz)    Admission Cmt: None   Principal Problem: UTI (urinary tract infection) [N39.0]                   Active Insurance as of 3/3/2025       Primary Coverage       Payor Plan Insurance Group Employer/Plan Group    UNITED HEALTHCARE MEDICARE REPLACEMENT UHC MEDICARE ADVANTAGE GROUP [id number]       Payor Plan Address Payor Plan Phone Number Payor Plan Fax Number Effective Dates    [address] [address]   1/1/2024 - None Entered    [facility] [id number]         Subscriber Name Subscriber Birth Date Member ID       GENE CARCAMO 1945 [id number]                     Emergency Contacts        (Rel.) Home Phone Work Phone Mobile Phone    RENNY CARCAMO (Son) [phone] -- [phone]    goldy potter (Grandchild) [phone] -- [phone]    Damaris No (Sister) -- -- [phone]                "

## 2025-03-04 NOTE — CASE MANAGEMENT/SOCIAL WORK
"Discharge Planning Assessment   [place]     Patient Name: Jaja Carcamo  MRN: [id number]  Today's Date: 3/4/2025    Admit Date: 3/3/2025    Plan: Plan to return to [facility]. Precert required to return as SNF. No PASRR required.   Discharge Needs Assessment       Row Name 03/04/25 1236       Living Environment    People in Home other (see comments)    Unique Family Situation LT - [facility]    Current Living Arrangements extended care facility    Potentially Unsafe Housing Conditions none    In the past 12 months has the electric, gas, oil, or water company threatened to shut off services in your home? No    Provides Primary Care For no one    Family Caregiver if Needed other (see comments);child(katarzyna), adult    Family Caregiver Names Aris \"Harish\" son    Quality of Family Relationships helpful;involved;supportive    Able to Return to Prior Arrangements yes       Resource/Environmental Concerns    Resource/Environmental Concerns none    Transportation Concerns none       Transportation Needs    In the past 12 months, has lack of transportation kept you from medical appointments or from getting medications? no    In the past 12 months, has lack of transportation kept you from meetings, work, or from getting things needed for daily living? No       Food Insecurity    Within the past 12 months, you worried that your food would run out before you got the money to buy more. Never true    Within the past 12 months, the food you bought just didn't last and you didn't have money to get more. Never true       Transition Planning    Patient/Family Anticipates Transition to long-term care facility    Patient/Family Anticipated Services at Transition none    Transportation Anticipated health plan transportation;family or friend will provide       Discharge Needs Assessment    Readmission Within the Last 30 Days no previous admission in last 30 days    Equipment Currently Used at Home wheelchair;hospital bed "    Concerns to be Addressed discharge planning    Anticipated Changes Related to Illness none                   Discharge Plan       Row Name 03/04/25 1236       Plan    Plan Plan to return to [facility]. Precert required to return as SNF. No PASRR required.    Patient/Family in Agreement with Plan yes    Plan Comments CM met with patient/family at bedside. Patient alert but confused. Patient lives at [facility], ok to return per liasion and family, will need Precvert to return as SNF. Ambulance transport at discharge. Patient  is dependent ADLs, can sit in a WC with assist but not to travel. PCP and pharmacy (facility) confirmed.  Denies financial assistance needs for medication and/or food. DC Barriers: AIRVO 35L/40%, IV Abxs, IV Lasix, Midodrine, Lung scan pending, Nephro/WC following.                  Continued Care and Services - Admitted Since 3/3/2025       Destination       Service Provider Request Status Services Address Phone Fax Patient Preferred    [facility] Accepted -- [address] [facility] [place] [address] [phone] --                  Selected Continued Care - Prior Encounters Includes continued care and service providers with selected services from prior encounters from 12/3/2024 to 3/4/2025      Discharged on 12/19/2024 Admission date: 11/29/2024 - Discharge disposition: Skilled Nursing Facility (DC - External)      Destination       Service Provider Services Address Phone Fax Patient Preferred    [facility] [address] [facility] [place] [address] [phone] --                          Expected Discharge Date and Time       Expected Discharge Date Expected Discharge Time    Mar 7, 2025            Demographic Summary       Row Name 03/04/25 1235       General Information    Admission Type inpatient    Arrived From emergency  department    Required Notices Provided Important Message from Medicare    Referral Source admission list    Reason for Consult discharge planning    Preferred Language English                   Functional Status       Row Name 03/04/25 1235       Functional Status    Usual Activity Tolerance poor    Current Activity Tolerance poor       Functional Status, IADL    Medications completely dependent    Meal Preparation completely dependent    Housekeeping completely dependent    Laundry completely dependent    Shopping completely dependent       Mental Status    General Appearance WDL WDL       Mental Status Summary    Recent Changes in Mental Status/Cognitive Functioning no changes             BEAU Hassan RN  ICU/CVU   O: [phone]  C: [phone]  [email or web address]

## 2025-03-04 NOTE — SIGNIFICANT NOTE
Patient remained hypotensive after albumin and extra midodrine as well as hypoxic and hypercapnic after initiating airvo.  Spoke with ICU NP, Jade who accepted the patient.  Bipap to be initiated.  Transfer to ICU order entered.

## 2025-03-04 NOTE — PROGRESS NOTES
LOS: 1 day   Patient Care Team:  Kvng Guillen MD as PCP - General (Family Medicine)  Jak Gavin MD as Cardiologist (Cardiology)    Subjective     Interval History: Improved since admission.  Mental status has returned to baseline.  Able to take oral medications.  Did not require pressors.    Patient Complaints: Chronic edema of right upper extremity.  Pain in both feet at sites of pressure wounds    History taken from: patient    Review of Systems   Constitutional:  Positive for activity change, appetite change and fatigue. Negative for chills, diaphoresis and fever.   HENT:  Negative for congestion and facial swelling.    Eyes:  Negative for visual disturbance.   Respiratory:  Negative for cough, shortness of breath, wheezing and stridor.    Cardiovascular:  Positive for leg swelling. Negative for chest pain and palpitations.   Gastrointestinal:  Negative for abdominal pain, constipation, diarrhea, nausea and vomiting.   Endocrine: Negative for polyuria.   Genitourinary:  Negative for dysuria.   Musculoskeletal:  Positive for arthralgias, gait problem and myalgias.   Neurological:  Negative for dizziness, syncope, weakness and light-headedness.   Psychiatric/Behavioral:  Negative for confusion.            Objective     Vital Signs  Temp:  [97.7 °F (36.5 °C)-98.7 °F (37.1 °C)] 97.7 °F (36.5 °C)  Heart Rate:  [] 107  Resp:  [14-18] 18  BP: ()/(42-78) 102/55    Physical Exam:     General Appearance:    Alert, cooperative, in no acute distress,   Head:    Normocephalic, without obvious abnormality, atraumatic   Eyes:            Lids and lashes normal, conjunctivae and sclerae normal, no   icterus, no pallor, corneas clear, PERRLA   Ears:    Ears appear intact with no abnormalities noted   Throat:   No oral lesions, no thrush, oral mucosa moist   Neck:   No adenopathy, supple, trachea midline, no thyromegaly, no   carotid bruit, no JVD   Lungs:     Clear to auscultation,respirations regular, even  and                  unlabored    Heart:    Regular rhythm and normal rate, normal S1 and S2, no            murmur, no gallop, no rub, no click   Chest Wall:    No abnormalities observed   Abdomen:     Normal bowel sounds, no masses, no organomegaly, soft        Non-tender non-distended, no guarding,   Extremities: 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   Pulses:   Pulses palpable and equal bilaterally   Skin:   No bleeding, bruising or rash   Lymph nodes:   No palpable adenopathy   Neurologic:   Cranial nerves 2 - 12 grossly intact, sensation intact, DTR       present and equal bilaterally        Results Review:    Lab Results (last 24 hours)       Procedure Component Value Units Date/Time    MRSA Screen, PCR (Inpatient) - Swab, Nares [[id number]]  (Normal) Collected: 03/04/25 0951    Specimen: Swab from Nares Updated: 03/04/25 1111     MRSA PCR No MRSA Detected    Narrative:      The negative predictive value of this diagnostic test is high and should only be used to consider de-escalating anti-MRSA therapy. A positive result may indicate colonization with MRSA and must be correlated clinically.    Urine Culture - Urine, Straight Cath [[id number]]  (Normal) Collected: 03/03/25 1733    Specimen: Urine from Straight Cath Updated: 03/04/25 1032     Urine Culture Culture in progress    D-dimer, Quantitative [[id number]]  (Abnormal) Collected: 03/04/25 0549    Specimen: Blood Updated: 03/04/25 0714     D-Dimer, Quantitative >20.00 MCGFEU/mL     Narrative:      According to the assay 's published package insert, a normal (<0.50 MCGFEU/mL) D-dimer result in conjunction with a non-high clinical probability assessment, excludes deep vein thrombosis (DVT) and pulmonary embolism (PE) with high sensitivity.    D-dimer values increase with age and this can make VTE exclusion of an older population difficult. To address this, the [employer], based on best available evidence and  "recent guidelines, recommends that clinicians use age-adjusted D-dimer thresholds in patients greater than 50 years of age with: a) a low probability of PE who do not meet all Pulmonary Embolism Rule Out Criteria, or b) in those with intermediate probability of PE.   The formula for an age-adjusted D-dimer cut-off is \"age/100\".  For example, a 60 year old patient would have an age-adjusted cut-off of 0.60 MCGFEU/mL and an 80 year old 0.80 MCGFEU/mL.    Comprehensive Metabolic Panel [[id number]]  (Abnormal) Collected: 03/04/25 0549    Specimen: Blood Updated: 03/04/25 0706     Glucose 120 mg/dL      BUN 48 mg/dL      Creatinine 1.84 mg/dL      Sodium 140 mmol/L      Potassium 4.9 mmol/L      Chloride 103 mmol/L      CO2 28.3 mmol/L      Calcium 9.5 mg/dL      Total Protein 6.2 g/dL      Albumin 3.7 g/dL      ALT (SGPT) 5 U/L      AST (SGOT) 18 U/L      Alkaline Phosphatase 67 U/L      Total Bilirubin 0.4 mg/dL      Globulin 2.5 gm/dL      A/G Ratio 1.5 g/dL      BUN/Creatinine Ratio 26.1     Anion Gap 8.7 mmol/L      eGFR 27.8 mL/min/1.73     Narrative:      GFR Categories in Chronic Kidney Disease (CKD)      GFR Category          GFR (mL/min/1.73)    Interpretation  G1                     90 or greater         Normal or high (1)  G2                      60-89                Mild decrease (1)  G3a                   45-59                Mild to moderate decrease  G3b                   30-44                Moderate to severe decrease  G4                    15-29                Severe decrease  G5                    14 or less           Kidney failure          (1)In the absence of evidence of kidney disease, neither GFR category G1 or G2 fulfill the criteria for CKD.    eGFR calculation 2021 CKD-EPI creatinine equation, which does not include race as a factor    Magnesium [[id number]]  (Normal) Collected: 03/04/25 0549    Specimen: Blood Updated: 03/04/25 0636     Magnesium 2.0 mg/dL     Lipid Panel [[id number]]  " (Abnormal) Collected: 03/04/25 0549    Specimen: Blood Updated: 03/04/25 0625     Total Cholesterol 82 mg/dL      Triglycerides 104 mg/dL      HDL Cholesterol 20 mg/dL      LDL Cholesterol  42 mg/dL      VLDL Cholesterol 20 mg/dL      LDL/HDL Ratio 2.06    Narrative:      Cholesterol Reference Ranges  (U.S. Department of Health and Human Services ATP III Classifications)    Desirable          <200 mg/dL  Borderline High    200-239 mg/dL  High Risk          >240 mg/dL      Triglyceride Reference Ranges  (U.S. Department of Health and Human Services ATP III Classifications)    Normal           <150 mg/dL  Borderline High  150-199 mg/dL  High             200-499 mg/dL  Very High        >500 mg/dL    HDL Reference Ranges  (U.S. Department of Health and Human Services ATP III Classifications)    Low     <40 mg/dl (major risk factor for CHD)  High    >60 mg/dl ('negative' risk factor for CHD)        LDL Reference Ranges  (U.S. Department of Health and Human Services ATP III Classifications)    Optimal          <100 mg/dL  Near Optimal     100-129 mg/dL  Borderline High  130-159 mg/dL  High             160-189 mg/dL  Very High        >189 mg/dL    LDL is calculated using the NIH LDL-C calculation.      Blood Gas, Venous - [[id number]]  (Abnormal) Collected: 03/04/25 0538    Specimen: Venous Blood Updated: 03/04/25 0553     Site Left Radial     pH, Venous 7.232 pH Units      pCO2, Venous 78.6 mm Hg      pO2, Venous 21.2 mm Hg      HCO3, Venous 33.1 mmol/L      Base Excess, Venous 3.7 mmol/L      Comment: Serial Number: [id number]:  [id number]        O2 Saturation, Venous 24.7 %      CO2 Content 35.5 mmol/L      Barometric Pressure for Blood Gas --     Comment: N/A        Modality Vapotherm     FIO2 60 %      Notified Who TONYA de la garza    Blood Gas, Venous - [[id number]]  (Abnormal) Collected: 03/04/25 0412    Specimen: Venous Blood Updated: 03/04/25 0424     Site Left Brachial     pH, Venous 7.269 pH Units      pCO2, Venous 73.8  mm Hg      pO2, Venous 27.6 mm Hg      HCO3, Venous 33.8 mmol/L      Base Excess, Venous 5.3 mmol/L      Comment: Serial Number: [id number]:  [id number]        O2 Saturation, Venous 40.7 %      CO2 Content 36.0 mmol/L      Barometric Pressure for Blood Gas --     Comment: N/A        Modality HFNC     FIO2 60 %     [facility] PCR - Swab, Axilla Right, Axilla Left and Groin [[id number]] Collected: 03/04/25 0347    Specimen: Swab from Axilla Right, Axilla Left and Groin Updated: 03/04/25 0350    Basic Metabolic Panel [[id number]]  (Abnormal) Collected: 03/04/25 0307    Specimen: Blood from Arm, Left Updated: 03/04/25 0340     Glucose 128 mg/dL      BUN 48 mg/dL      Creatinine 1.87 mg/dL      Sodium 143 mmol/L      Potassium 4.8 mmol/L      Chloride 104 mmol/L      CO2 29.9 mmol/L      Calcium 9.2 mg/dL      BUN/Creatinine Ratio 25.7     Anion Gap 9.1 mmol/L      eGFR 27.3 mL/min/1.73     Narrative:      GFR Categories in Chronic Kidney Disease (CKD)      GFR Category          GFR (mL/min/1.73)    Interpretation  G1                     90 or greater         Normal or high (1)  G2                      60-89                Mild decrease (1)  G3a                   45-59                Mild to moderate decrease  G3b                   30-44                Moderate to severe decrease  G4                    15-29                Severe decrease  G5                    14 or less           Kidney failure          (1)In the absence of evidence of kidney disease, neither GFR category G1 or G2 fulfill the criteria for CKD.    eGFR calculation 2021 CKD-EPI creatinine equation, which does not include race as a factor    Magnesium [[id number]]  (Normal) Collected: 03/04/25 0307    Specimen: Blood from Arm, Left Updated: 03/04/25 0340     Magnesium 2.0 mg/dL     Hemoglobin A1c [[id number]]  (Normal) Collected: 03/04/25 0124    Specimen: Blood from Arm, Left Updated: 03/04/25 0241     Hemoglobin A1C 5.06 %     POCT Electrolytes +HGB +HCT  [[id number]]  (Abnormal) Collected: 03/04/25 0231    Specimen: Arterial Blood Updated: 03/04/25 0234     Sodium 138 mmol/L      POC Potassium 4.8 mmol/L      Ionized Calcium 1.39 mmol/L      Comment: Serial Number: [id number]:  [id number]        Glucose 141 mg/dL      Hematocrit 29 %      Hemoglobin 10.0 g/dL     POC Lactate [[id number]]  (Normal) Collected: 03/04/25 0231    Specimen: Arterial Blood Updated: 03/04/25 0234     Lactate 1.0 mmol/L      Comment: Serial Number: [id number]:  [id number]       POC Glucose Once [[id number]]  (Abnormal) Collected: 03/04/25 0231    Specimen: Arterial Blood Updated: 03/04/25 0234     Glucose 141 mg/dL      Comment: Serial Number: [id number]:  [id number]       Blood Gas, Arterial - [[id number]]  (Abnormal) Collected: 03/04/25 0231    Specimen: Arterial Blood Updated: 03/04/25 0234     Site Left Radial     Orion's Test Positive     pH, Arterial 7.291 pH units      pCO2, Arterial 69.0 mm Hg      pO2, Arterial 82.0 mm Hg      HCO3, Arterial 33.3 mmol/L      Base Excess, Arterial 5.2 mmol/L      Comment: Serial Number: [id number]:  [id number]        O2 Saturation, Arterial 94.0 %      Barometric Pressure for Blood Gas --     Comment: N/A        Modality Cannula     FIO2 40 %      Hemodilution No     PO2/FIO2 205    POC Creatinine [[id number]]  (Abnormal) Collected: 03/04/25 0231    Specimen: Arterial Blood Updated: 03/04/25 0234     Creatinine 2.07 mg/dL      Comment: Serial Number: [id number]:  [id number]        eGFR 24.1 mL/min/1.73     Cortisol [[id number]] Collected: 03/04/25 0124    Specimen: Blood Updated: 03/04/25 0210     Cortisol 1.83 mcg/dL     Narrative:      Cortisol Reference Ranges:    Cortisol 6AM - 10AM Range: 6.02-18.40 mcg/dl  Cortisol 4PM - 8PM Range: 2.68-10.50 mcg/dl      Results may be falsely increased if patient taking Biotin.      Phosphorus [[id number]]  (Abnormal) Collected: 03/04/25 0124    Specimen: Blood Updated: 03/04/25 0210     Phosphorus 4.6 mg/dL     Ammonia  [[id number]]  (Normal) Collected: 03/04/25 0124    Specimen: Blood from Arm, Left Updated: 03/04/25 0152     Ammonia 22 umol/L     CBC & Differential [[id number]]  (Abnormal) Collected: 03/04/25 0124    Specimen: Blood from Arm, Left Updated: 03/04/25 0139    Narrative:      The following orders were created for panel order CBC & Differential.  Procedure                               Abnormality         Status                     ---------                               -----------         ------                     CBC Auto Differential[[id number]]        Abnormal            Final result                 Please view results for these tests on the individual orders.    CBC Auto Differential [[id number]]  (Abnormal) Collected: 03/04/25 0124    Specimen: Blood from Arm, Left Updated: 03/04/25 0139     WBC 8.18 10*3/mm3      RBC 3.05 10*6/mm3      Hemoglobin 9.1 g/dL      Hematocrit 31.5 %      .3 fL      MCH 29.8 pg      MCHC 28.9 g/dL      RDW 17.2 %      RDW-SD 64.2 fl      MPV 9.2 fL      Platelets 204 10*3/mm3      Neutrophil % 69.2 %      Lymphocyte % 15.3 %      Monocyte % 11.1 %      Eosinophil % 3.3 %      Basophil % 0.6 %      Immature Grans % 0.5 %      Neutrophils, Absolute 5.66 10*3/mm3      Lymphocytes, Absolute 1.25 10*3/mm3      Monocytes, Absolute 0.91 10*3/mm3      Eosinophils, Absolute 0.27 10*3/mm3      Basophils, Absolute 0.05 10*3/mm3      Immature Grans, Absolute 0.04 10*3/mm3      nRBC 0.4 /100 WBC     Tacrolimus Level [[id number]] Collected: 03/04/25 0124    Specimen: Blood from Arm, Right Updated: 03/04/25 0132    TSH [[id number]]  (Abnormal) Collected: 03/03/25 1839    Specimen: Blood from Arm, Left Updated: 03/03/25 2050     TSH 33.000 uIU/mL     T4, Free [[id number]]  (Abnormal) Collected: 03/03/25 1839    Specimen: Blood from Arm, Left Updated: 03/03/25 2050     Free T4 0.30 ng/dL     High Sensitivity Troponin T 1Hr [[id number]]  (Abnormal) Collected: 03/03/25 1839    Specimen: Blood from  Arm, Left Updated: 03/03/25 1907     HS Troponin T 49 ng/L      Troponin T Numeric Delta -5 ng/L      Troponin T % Delta -9    Narrative:      High Sensitive Troponin T Reference Range:  <14.0 ng/L- Negative Female for AMI  <22.0 ng/L- Negative Male for AMI  >=14 - Abnormal Female indicating possible myocardial injury.  >=22 - Abnormal Male indicating possible myocardial injury.   Clinicians would have to utilize clinical acumen, EKG, Troponin, and serial changes to determine if it is an Acute Myocardial Infarction or myocardial injury due to an underlying chronic condition.         Extra Tubes [[id number]] Collected: 03/03/25 1839    Specimen: Blood from Arm, Left Updated: 03/03/25 1845    Narrative:      The following orders were created for panel order Extra Tubes.  Procedure                               Abnormality         Status                     ---------                               -----------         ------                     Gold Top - SST[[id number]]                                   Final result                 Please view results for these tests on the individual orders.    Gold Top - SST [[id number]] Collected: 03/03/25 1839    Specimen: Blood from Arm, Left Updated: 03/03/25 1845     Extra Tube Hold for add-ons.     Comment: Auto resulted.       Urinalysis, Microscopic Only - Straight Cath [[id number]]  (Abnormal) Collected: 03/03/25 1733    Specimen: Urine from Straight Cath Updated: 03/03/25 1819     RBC, UA 0-2 /HPF      WBC, UA Too Numerous to Count /HPF      Bacteria, UA 4+ /HPF      Squamous Epithelial Cells, UA 0-2 /HPF      Hyaline Casts, UA 13-20 /LPF      Methodology Manual Light Microscopy    Comprehensive Metabolic Panel [[id number]]  (Abnormal) Collected: 03/03/25 1712    Specimen: Blood Updated: 03/03/25 1741     Glucose 134 mg/dL      BUN 48 mg/dL      Creatinine 2.01 mg/dL      Sodium 135 mmol/L      Potassium 5.3 mmol/L      Chloride 97 mmol/L      CO2 30.8 mmol/L      Calcium 9.6  mg/dL      Total Protein 6.4 g/dL      Albumin 3.1 g/dL      ALT (SGPT) 5 U/L      AST (SGOT) 15 U/L      Alkaline Phosphatase 85 U/L      Total Bilirubin 0.4 mg/dL      Globulin 3.3 gm/dL      A/G Ratio 0.9 g/dL      BUN/Creatinine Ratio 23.9     Anion Gap 7.2 mmol/L      eGFR 25.0 mL/min/1.73     Narrative:      GFR Categories in Chronic Kidney Disease (CKD)      GFR Category          GFR (mL/min/1.73)    Interpretation  G1                     90 or greater         Normal or high (1)  G2                      60-89                Mild decrease (1)  G3a                   45-59                Mild to moderate decrease  G3b                   30-44                Moderate to severe decrease  G4                    15-29                Severe decrease  G5                    14 or less           Kidney failure          (1)In the absence of evidence of kidney disease, neither GFR category G1 or G2 fulfill the criteria for CKD.    eGFR calculation 2021 CKD-EPI creatinine equation, which does not include race as a factor    Urinalysis With Microscopic If Indicated (No Culture) - Straight Cath [[id number]]  (Abnormal) Collected: 03/03/25 1733    Specimen: Urine from Straight Cath Updated: 03/03/25 1741     Color, UA Dark Yellow     Appearance, UA Cloudy     pH, UA 6.0     Specific Gravity, UA 1.017     Glucose, UA Negative     Ketones, UA Trace     Bilirubin, UA Negative     Blood, UA Moderate (2+)     Protein, UA 30 mg/dL (1+)     Leuk Esterase, UA Large (3+)     Nitrite, UA Negative     Urobilinogen, UA 1.0 E.U./dL    High Sensitivity Troponin T [[id number]]  (Abnormal) Collected: 03/03/25 1712    Specimen: Blood Updated: 03/03/25 1739     HS Troponin T 54 ng/L     Narrative:      High Sensitive Troponin T Reference Range:  <14.0 ng/L- Negative Female for AMI  <22.0 ng/L- Negative Male for AMI  >=14 - Abnormal Female indicating possible myocardial injury.  >=22 - Abnormal Male indicating possible myocardial injury.    Clinicians would have to utilize clinical acumen, EKG, Troponin, and serial changes to determine if it is an Acute Myocardial Infarction or myocardial injury due to an underlying chronic condition.         BNP [[id number]]  (Abnormal) Collected: 03/03/25 1712    Specimen: Blood Updated: 03/03/25 1736     proBNP 17,411.0 pg/mL     Narrative:      This assay is used as an aid in the diagnosis of individuals suspected of having heart failure. It can be used as an aid in the diagnosis of acute decompensated heart failure (ADHF) in patients presenting with signs and symptoms of ADHF to the emergency department (ED). In addition, NT-proBNP of <300 pg/mL indicates ADHF is not likely.    Age Range Result Interpretation  NT-proBNP Concentration (pg/mL:      <50             Positive            >450                   Gray                 300-450                    Negative             <300    50-75           Positive            >900                  Gray                300-900                  Negative            <300      >75             Positive            >1800                  Gray                300-1800                  Negative            <300    Blood Culture - Blood, Arm, Left [[id number]] Collected: 03/03/25 1716    Specimen: Blood from Arm, Left Updated: 03/03/25 1721    Blood Culture - Blood, Arm, Left [[id number]] Collected: 03/03/25 1716    Specimen: Blood from Arm, Left Updated: 03/03/25 1721    CBC & Differential [[id number]]  (Abnormal) Collected: 03/03/25 1712    Specimen: Blood Updated: 03/03/25 1717    Narrative:      The following orders were created for panel order CBC & Differential.  Procedure                               Abnormality         Status                     ---------                               -----------         ------                     CBC Auto Differential[[id number]]        Abnormal            Final result                 Please view results for these tests on the individual orders.     CBC Auto Differential [[id number]]  (Abnormal) Collected: 03/03/25 1712    Specimen: Blood Updated: 03/03/25 1717     WBC 9.73 10*3/mm3      RBC 3.17 10*6/mm3      Hemoglobin 9.4 g/dL      Hematocrit 32.8 %      .5 fL      MCH 29.7 pg      MCHC 28.7 g/dL      RDW 17.1 %      RDW-SD 64.5 fl      MPV 9.0 fL      Platelets 234 10*3/mm3      Neutrophil % 72.7 %      Lymphocyte % 14.3 %      Monocyte % 8.5 %      Eosinophil % 3.5 %      Basophil % 0.5 %      Immature Grans % 0.5 %      Neutrophils, Absolute 7.07 10*3/mm3      Lymphocytes, Absolute 1.39 10*3/mm3      Monocytes, Absolute 0.83 10*3/mm3      Eosinophils, Absolute 0.34 10*3/mm3      Basophils, Absolute 0.05 10*3/mm3      Immature Grans, Absolute 0.05 10*3/mm3      nRBC 0.2 /100 WBC     [place] Draw [[id number]] Collected: 03/03/25 1712    Specimen: Blood Updated: 03/03/25 1715    Narrative:      The following orders were created for panel order [place] Draw.  Procedure                               Abnormality         Status                     ---------                               -----------         ------                     Green Top (Gel)[[id number]]                                  Final result               Lavender Top[[id number]]                                     Final result               Gold Top - SST[[id number]]                                   Final result               Light Blue Top[[id number]]                                   Final result                 Please view results for these tests on the individual orders.    Green Top (Gel) [[id number]] Collected: 03/03/25 1712    Specimen: Blood Updated: 03/03/25 1715     Extra Tube Hold for add-ons.     Comment: Auto resulted.       Lavender Top [[id number]] Collected: 03/03/25 1712    Specimen: Blood Updated: 03/03/25 1715     Extra Tube hold for add-on     Comment: Auto resulted       Gold Top - SST [[id number]] Collected: 03/03/25 1712    Specimen: Blood Updated: 03/03/25 1715     Extra  Tube Hold for add-ons.     Comment: Auto resulted.       Light Blue Top [[id number]] Collected: 03/03/25 1712    Specimen: Blood Updated: 03/03/25 1715     Extra Tube Hold for add-ons.     Comment: Auto resulted       POC Lactate [[id number]]  (Normal) Collected: 03/03/25 1709    Specimen: Blood Updated: 03/03/25 1711     Lactate 0.6 mmol/L      Comment: Serial Number: [id number]:  [id number]       POC Glucose Once [[id number]]  (Abnormal) Collected: 03/03/25 1638    Specimen: Blood Updated: 03/03/25 1640     Glucose 124 mg/dL      Comment: Serial Number: [id number]:  [id number]                Imaging Results (Last 24 Hours)       Procedure Component Value Units Date/Time    NM Lung Ventilation Perfusion Aerosol [[id number]] Collected: 03/04/25 1244     Updated: 03/04/25 1250    Narrative:      NM LUNG VENTILATION PERFUSION AEROSOL    Date of Exam: 3/4/2025 12:20 PM EST    Indication: Shortness of breath, R/O PE.    Comparison: Chest radiograph 3/3/2025    Technique:  The patient was ventilated with 42 mCi of technetium 99m pyrophosphate aerosol and ventilation images were acquired in multiple obliquities. The patient then received 5.5 mCi of technetium 99m MAA intravenously and perfusion images were   acquired in multiple obliquities.      Findings:  Significant bilateral ventilation defects likely in keeping with known pulmonary edema, pleural effusions and/or underlying airspace disease. There are multiple small matched peripheral defects, without large peripheral wedge-shaped mismatch defect to   suggest pulmonary embolism.      Impression:      Impression:  No convincing pulmonary embolism.    Significantly abnormal ventilation likely reflecting patient's known pleural effusions, edema and/or underlying airspace disease.        Electronically Signed: Jaden Cummings MD    3/4/2025 12:48 PM EST    Workstation ID: [id number]    XR Chest 1 View [[id number]] Collected: 03/03/25 1752     Updated: 03/03/25 1800     Narrative:      XR CHEST 1 VW    Date of Exam: 3/3/2025 5:28 PM EST    Indication: sob    Comparison: Single view chest radiographs dated 3/1/2025 and 12/1/2024    Findings:  there are layering bilateral pleural effusions which obscure both diaphragms. Pulmonary vascularity is prominent. Prominent cardiac silhouette and senescent change in the thoracic aorta are present. Cardiac hilar mediastinal silhouettes are unchanged. No   pneumothorax. Trachea is midline.         Impression:      Impression:  Cardiomegaly with layering bilateral pleural effusions prominent pulmonary vascularity and suspected pulmonary edema. No significant radiographic change from 2 days ago.        Electronically Signed: Cristopherkit August, DO    3/3/2025 5:58 PM EST    Workstation ID: [id number]                 I reviewed the patient's new clinical results.    Medication Review:   Scheduled Meds:apixaban, 5 mg, Oral, Q12H  cefTRIAXone, 1,000 mg, Intravenous, Q24H  DULoxetine, 40 mg, Oral, Daily  furosemide, 60 mg, Intravenous, Once  HYDROcodone-acetaminophen, 1 tablet, Oral, Q6H  levothyroxine, 125 mcg, Oral, Q AM  metoprolol tartrate, 12.5 mg, Oral, Q12H  midodrine, 15 mg, Oral, Q8H  mirtazapine, 15 mg, Oral, Nightly  mupirocin, 1 Application, Each Nare, BID  pantoprazole, 40 mg, Oral, Q AM  polyethylene glycol, 17 g, Oral, Daily  [Held by provider] sacubitril-valsartan, 1 tablet, Oral, Q12H  sennosides-docusate, 1 tablet, Oral, BID  sodium chloride, 10 mL, Intravenous, Q12H  sodium chloride, 10 mL, Intravenous, Q12H  tacrolimus, 0.5 mg, Oral, BID  vitamin B-12, 1,000 mcg, Oral, Daily      Continuous Infusions:   PRN Meds:.  acetaminophen **OR** acetaminophen    aluminum-magnesium hydroxide-simethicone    senna-docusate sodium **AND** polyethylene glycol **AND** bisacodyl **AND** bisacodyl    hydrOXYzine    [Held by provider] LORazepam    nitroglycerin    ondansetron ODT **OR** ondansetron    prochlorperazine    [COMPLETED] Insert Peripheral IV  **AND** sodium chloride    sodium chloride    sodium chloride    sodium chloride    sodium chloride     Assessment & Plan       UTI (urinary tract infection)    COPD (chronic obstructive pulmonary disease)    Hypothyroidism, unspecified    History of kidney transplant    Long term (current) use of immunosuppressive biologic    Long term current use of anticoagulant therapy    History of DVT (deep vein thrombosis)    Pulmonary hypertension    Edema of both lower extremities due to peripheral venous insufficiency    Mixed anxiety and depressive disorder    Essential (primary) hypertension    Paroxysmal atrial fibrillation    Acute exacerbation of CHF (congestive heart failure)    End stage renal disease    Edema of right upper arm    TONYA (acute kidney injury)    -Clinically she is markedly improved with treatment of UTI.  Continue ceftriaxone and follow urine culture.  -Patient has chronic kidney disease and is followed by Dr. Falcon's group.  Creatinine is at her baseline but she is markedly fluid overloaded.  She is adamant that she will not consider restarting dialysis.  Will consult nephrologist for advice on oral diuretic therapy at discharge.  -Continue midodrine for blood pressure support  -Continue levothyroxine  - holding Entresto until BP recovers  - increasing levothyroxine for subtherapeutic  T4  -Remeron for appetite stimulant  -Continue antirejection meds for h/o kidney transplant  -Hg stable, improved compared to previous admission    DNR per pt wishes.    Plan for disposition:BILL Lowe MD  03/04/25  14:33 EST

## 2025-03-04 NOTE — ED PROVIDER NOTES
Subjective   History of Present Illness  79y/o F presents for AMS.   Reportedly unresponsive at facility, but awake but confused here.  Started on abx 2 days ago for UTI.  Hypotensive at facility, but received midodrine just before being sent to hospital.  CMO according to paperwork, but son stating Pt is DNR.  Pt has no complaints, but is confused A&Ox1.  Review of Systems  See HPI.  Past Medical History:   Diagnosis Date    Allergic rhinitis 04/14/2015    Asthma 08/10/2017    Atrial flutter 05/11/2017    Chronic diarrhea 04/15/2019    Chronic hypoxemic respiratory failure 01/18/2024    Chronic pain 02/03/2015    COPD (chronic obstructive pulmonary disease)     COVID-19 virus detected 08/11/2022    Cytokine release syndrome, grade 1 08/16/2022    Edema of both lower extremities due to peripheral venous insufficiency 03/12/2021    ESRD on hemodialysis 05/22/2013    Essential (primary) hypertension 03/03/2022    Fracture of ulnar styloid 05/19/2022    Fracture of unspecified carpal bone, right wrist, subsequent encounter for fracture with routine healing 03/03/2022    Gastroesophageal reflux disease 11/12/2020    Gout 08/10/2017    Hearing loss 08/10/2017    History of appendectomy     History of DVT (deep vein thrombosis) 11/12/2020    History of kidney transplant 06/30/2017    History of lobectomy of lung 01/18/2024 03/08/2016:  RIGHT Lower Lobe Mass--> Right Video-assisted thoracoscopy with a moderate-to-large wedge resection of the RLL (by Dr. Haris Mcconnell @ [facility])--> Poorly differentiated carcinoma of the RLL.      History of repair of hip joint 05/21/2013    History of suicide attempt 05/20/2024    Hyperlipidemia 08/11/2014    Hypothyroidism, unspecified 03/03/2022    Infection due to extended spectrum beta lactamase (ESBL) producing bacteria 03/11/2016    No A2K system hx. +ESBL E coli urine on 3/11/16.      Irritable bowel syndrome 04/15/2019    Long term (current) use of immunosuppressive  biologic 04/28/2021    Long term current use of anticoagulant therapy 01/18/2024    Malignant neoplasm of lung 08/10/2017    Menopausal flushing 08/30/2021    Mitral valve regurgitation 07/06/2015    Mixed anxiety and depressive disorder 04/30/2015    Non-small cell lung cancer 08/10/2017    Nonobstructive atherosclerosis of coronary artery 06/02/2019 08/12/2022: CATH: [facility]: NSTEMI assoc with Covid-19: LM:-nl;  LAD: diffuse, Ca++; 30%; CIRC: Dominant. Normal; RCA: small; 50% diffuse, proximal and mid-segment.      NSTEMI (non-ST elevated myocardial infarction) 08/11/2022    Obsessive-compulsive disorder 04/15/2019    Osteoarthritis 05/20/2024    Paroxysmal atrial fibrillation 05/11/2017    Paroxysmal supraventricular tachycardia 05/11/2017    Peripheral neuropathy 08/11/2014    Personal history of peptic ulcer disease 11/12/2020    Postoperative anemia due to acute blood loss 05/22/2013    Right wrist fracture 03/01/2022    Seasonal allergies 08/10/2017    Secondary hyperparathyroidism of renal origin 09/14/2015    Tricuspid valve regurgitation 03/15/2017    Ulcer of lower extremity 08/30/2021    Unspecified acute appendicitis 03/03/2022    Valvular heart disease 05/20/2024    Wegener's granulomatosis with renal involvement 03/03/2022       Allergies   Allergen Reactions    Zolpidem Other (See Comments), Unknown (See Comments) and Unknown - High Severity     KEPT HER AWAKE  KEPT HER AWAKE  KEPT HER AWAKE  KEPT HER AWAKE         Past Surgical History:   Procedure Laterality Date    APPENDECTOMY      ARTERIOVENOUS FISTULA/SHUNT SURGERY Right 12/3/2024    Procedure: FISTULOGRAM  and angioplasty RIGHT ARM;  Surgeon: Paulino Alva II, MD;  Location: [facility] HYBRID OR;  Service: Vascular;  Laterality: Right;    BREAST SURGERY Left     cysts rmeoved    CARDIAC CATHETERIZATION Right 08/12/2022    Procedure: Left Heart Cath and coronary angiogram;  Surgeon: Jak Gavin MD;  Location: [facility] CATH INVASIVE  "LOCATION;  Service: Cardiology;  Laterality: Right;    CLOSED REDUCTION WRIST FRACTURE Right 03/01/2022    Procedure: WRIST CLOSED REDUCTION;  Surgeon: Gaudencio Townsend MD;  Location: [facility] MAIN OR;  Service: Orthopedics;  Laterality: Right;    CYSTOSCOPY      HIP ARTHROPLASTY      HYSTERECTOMY      LUNG LOBECTOMY Right     TRANSPLANTATION RENAL         Family History   Problem Relation Age of Onset    No Known Problems Mother     No Known Problems Father        Social History     Socioeconomic History    Marital status:    Tobacco Use    Smoking status: Former     Passive exposure: Current    Smokeless tobacco: Never   Vaping Use    Vaping status: Former   Substance and Sexual Activity    Alcohol use: Never    Drug use: Never    Sexual activity: Defer           Objective   Physical Exam  NAD, normal rate, bilateral rales, mild tahypnea, BLE wrapped, abd soft and nt w/o rebound or guarding, A&Ox1  Procedures           ED Course      BP (!) 89/50   Pulse 90   Temp 97.7 °F (36.5 °C) (Oral)   Resp 14   Ht 167.6 cm (66\")   Wt 81 kg (178 lb 9.2 oz)   SpO2 96%   BMI 28.82 kg/m²   Labs Reviewed   COMPREHENSIVE METABOLIC PANEL - Abnormal; Notable for the following components:       Result Value    Glucose 134 (*)     BUN 48 (*)     Creatinine 2.01 (*)     Sodium 135 (*)     Potassium 5.3 (*)     Chloride 97 (*)     CO2 30.8 (*)     Albumin 3.1 (*)     eGFR 25.0 (*)     All other components within normal limits    Narrative:     GFR Categories in Chronic Kidney Disease (CKD)      GFR Category          GFR (mL/min/1.73)    Interpretation  G1                     90 or greater         Normal or high (1)  G2                      60-89                Mild decrease (1)  G3a                   45-59                Mild to moderate decrease  G3b                   30-44                Moderate to severe decrease  G4                    15-29                Severe decrease  G5                    14 or less           Kidney " failure          (1)In the absence of evidence of kidney disease, neither GFR category G1 or G2 fulfill the criteria for CKD.    eGFR calculation 2021 CKD-EPI creatinine equation, which does not include race as a factor   TROPONIN - Abnormal; Notable for the following components:    HS Troponin T 54 (*)     All other components within normal limits    Narrative:     High Sensitive Troponin T Reference Range:  <14.0 ng/L- Negative Female for AMI  <22.0 ng/L- Negative Male for AMI  >=14 - Abnormal Female indicating possible myocardial injury.  >=22 - Abnormal Male indicating possible myocardial injury.   Clinicians would have to utilize clinical acumen, EKG, Troponin, and serial changes to determine if it is an Acute Myocardial Infarction or myocardial injury due to an underlying chronic condition.        BNP (IN-HOUSE) - Abnormal; Notable for the following components:    proBNP 17,411.0 (*)     All other components within normal limits    Narrative:     This assay is used as an aid in the diagnosis of individuals suspected of having heart failure. It can be used as an aid in the diagnosis of acute decompensated heart failure (ADHF) in patients presenting with signs and symptoms of ADHF to the emergency department (ED). In addition, NT-proBNP of <300 pg/mL indicates ADHF is not likely.    Age Range Result Interpretation  NT-proBNP Concentration (pg/mL:      <50             Positive            >450                   Gray                 300-450                    Negative             <300    50-75           Positive            >900                  Gray                300-900                  Negative            <300      >75             Positive            >1800                  Gray                300-1800                  Negative            <300   URINALYSIS W/ MICROSCOPIC IF INDICATED (NO CULTURE) - Abnormal; Notable for the following components:    Color, UA Dark Yellow (*)     Appearance, UA Cloudy (*)      Ketones, UA Trace (*)     Blood, UA Moderate (2+) (*)     Protein, UA 30 mg/dL (1+) (*)     Leuk Esterase, UA Large (3+) (*)     All other components within normal limits   CBC WITH AUTO DIFFERENTIAL - Abnormal; Notable for the following components:    RBC 3.17 (*)     Hemoglobin 9.4 (*)     Hematocrit 32.8 (*)     .5 (*)     MCHC 28.7 (*)     RDW 17.1 (*)     RDW-SD 64.5 (*)     Lymphocyte % 14.3 (*)     Neutrophils, Absolute 7.07 (*)     All other components within normal limits   HIGH SENSITIVITIY TROPONIN T 1HR - Abnormal; Notable for the following components:    HS Troponin T 49 (*)     All other components within normal limits    Narrative:     High Sensitive Troponin T Reference Range:  <14.0 ng/L- Negative Female for AMI  <22.0 ng/L- Negative Male for AMI  >=14 - Abnormal Female indicating possible myocardial injury.  >=22 - Abnormal Male indicating possible myocardial injury.   Clinicians would have to utilize clinical acumen, EKG, Troponin, and serial changes to determine if it is an Acute Myocardial Infarction or myocardial injury due to an underlying chronic condition.        URINALYSIS, MICROSCOPIC ONLY - Abnormal; Notable for the following components:    WBC, UA Too Numerous to Count (*)     Bacteria, UA 4+ (*)     All other components within normal limits   TSH - Abnormal; Notable for the following components:    TSH 33.000 (*)     All other components within normal limits   T4, FREE - Abnormal; Notable for the following components:    Free T4 0.30 (*)     All other components within normal limits   POCT GLUCOSE FINGERSTICK - Abnormal; Notable for the following components:    Glucose 124 (*)     All other components within normal limits   POC LACTATE - Normal   BLOOD CULTURE   BLOOD CULTURE   URINE CULTURE   CANDIDA AURIS PCR   RAINBOW DRAW    Narrative:     The following orders were created for panel order [place] Draw.  Procedure                               Abnormality         Status                      ---------                               -----------         ------                     Green Top (Gel)[[id number]]                                  Final result               Lavender Top[[id number]]                                     Final result               Gold Top - SST[[id number]]                                   Final result               Light Blue Top[[id number]]                                   Final result                 Please view results for these tests on the individual orders.   BLOOD GAS, VENOUS   TACROLIMUS LEVEL   CORTISOL   AMMONIA   BASIC METABOLIC PANEL   MAGNESIUM   PHOSPHORUS   CBC WITH AUTO DIFFERENTIAL   HEMOGLOBIN A1C   GREEN TOP   LAVENDER TOP   GOLD TOP - SST   LIGHT BLUE TOP   CBC AND DIFFERENTIAL    Narrative:     The following orders were created for panel order CBC & Differential.  Procedure                               Abnormality         Status                     ---------                               -----------         ------                     CBC Auto Differential[[id number]]        Abnormal            Final result                 Please view results for these tests on the individual orders.   EXTRA TUBES    Narrative:     The following orders were created for panel order Extra Tubes.  Procedure                               Abnormality         Status                     ---------                               -----------         ------                     Gold Top - SST[[id number]]                                   Final result                 Please view results for these tests on the individual orders.   GOLD TOP - SST   CBC AND DIFFERENTIAL    Narrative:     The following orders were created for panel order CBC & Differential.  Procedure                               Abnormality         Status                     ---------                               -----------         ------                     CBC Auto Differential[[id number]]                                                          Please view results for these tests on the individual orders.     XR Chest 1 View   Final Result   Impression:   Cardiomegaly with layering bilateral pleural effusions prominent pulmonary vascularity and suspected pulmonary edema. No significant radiographic change from 2 days ago.            Electronically Signed: Cristopher AugustDO     3/3/2025 5:58 PM EST     Workstation ID: [id number]                                                         Medical Decision Making  Problems Addressed:  Acute UTI: complicated acute illness or injury  TONYA (acute kidney injury): complicated acute illness or injury  Hypervolemia, unspecified hypervolemia type: complicated acute illness or injury    Amount and/or Complexity of Data Reviewed  Labs: ordered.  Radiology: ordered.  ECG/medicine tests: ordered.    Risk  Prescription drug management.  Decision regarding hospitalization.    My interpretation of chest x-ray concerning for volume overload.  See system for radiology interpretation.    My interpretation of EKG is: A-fib, rate 96, PVC present, no STEMI.    Patient with new TONYA.  Hypotension with borderline maps.  Given dose of midodrine here.  Given 10 patient usually takes 5.  Patient with significantly worsening kidney function than 2 days ago.  Appears grossly volume overloaded but is hypotensive.  Admitted to PCU    Final diagnoses:   TONYA (acute kidney injury)   Acute UTI   Hypervolemia, unspecified hypervolemia type       ED Disposition  ED Disposition       ED Disposition   Decision to Admit    Condition   --    Comment   Level of Care: Telemetry [5]   Diagnosis: UTI (urinary tract infection) [788833]   Admitting Physician: ELLI GU [[id number]]   Attending Physician: ELLI GU [[id number]]   Certification: I Certify That Inpatient Hospital Services Are Medically Necessary For Greater Than 2 Midnights                 No follow-up provider specified.       Medication List      No changes were  made to your prescriptions during this visit.            Paulino Miller MD  03/04/25 0125

## 2025-03-04 NOTE — H&P
"    Patient Care Team:  Kvng Guillen MD as PCP - General (Family Medicine)  Jak Gavin MD as Cardiologist (Cardiology)    Chief complaint lethargy    Subjective     Patient is a 80 y.o. female with hx of Afib, DVT, CKD, kidney transplant  who resides at [facility] presented to the ER with concerns of lethargy.  She was seen in the ER on 3/1 and diagnosed with a UTI and prescribed Augmentin.  Since then, she continues to become more lethargic.   Per the son, she is primarly bedbound and gets her legs wrapped for swelling.  She has significant swelling in the RUE. She follows with Dr. Falcon, nephrology, but has not \"needed dialysis in a while.\" Spoke with the son regarding code status and he confirmed DNR/DNI.      In the ER, she was found to be in fluid overload with a BNP of 17,4111 and Troponin 54->49.  Creat 2.01 with GFR 23.9. K. 5.3, . Lactic was wnl.  Hgb 9.4 with WBC 9.73.  Urine with cloudy with moderate blood, 3+ leukocytes and 4+ bacteria, Urine and blood cultures are pending.  Rocephin was initiated.  CXR showed cardiomegaly with layering bilat pleural effusion and suspected pulmonary edema.    Review of Systems   Unable to perform ROS: Mental status change          History  Past Medical History:   Diagnosis Date    Allergic rhinitis 04/14/2015    Asthma 08/10/2017    Atrial flutter 05/11/2017    Chronic diarrhea 04/15/2019    Chronic hypoxemic respiratory failure 01/18/2024    Chronic pain 02/03/2015    COPD (chronic obstructive pulmonary disease)     COVID-19 virus detected 08/11/2022    Cytokine release syndrome, grade 1 08/16/2022    Edema of both lower extremities due to peripheral venous insufficiency 03/12/2021    ESRD on hemodialysis 05/22/2013    Essential (primary) hypertension 03/03/2022    Fracture of ulnar styloid 05/19/2022    Fracture of unspecified carpal bone, right wrist, subsequent encounter for fracture with routine healing 03/03/2022    Gastroesophageal reflux " disease 11/12/2020    Gout 08/10/2017    Hearing loss 08/10/2017    History of appendectomy     History of DVT (deep vein thrombosis) 11/12/2020    History of kidney transplant 06/30/2017    History of lobectomy of lung 01/18/2024 03/08/2016:  RIGHT Lower Lobe Mass--> Right Video-assisted thoracoscopy with a moderate-to-large wedge resection of the RLL (by Dr. Haris Mcconnell @ [facility])--> Poorly differentiated carcinoma of the RLL.      History of repair of hip joint 05/21/2013    History of suicide attempt 05/20/2024    Hyperlipidemia 08/11/2014    Hypothyroidism, unspecified 03/03/2022    Infection due to extended spectrum beta lactamase (ESBL) producing bacteria 03/11/2016    No A2K system hx. +ESBL E coli urine on 3/11/16.      Irritable bowel syndrome 04/15/2019    Long term (current) use of immunosuppressive biologic 04/28/2021    Long term current use of anticoagulant therapy 01/18/2024    Malignant neoplasm of lung 08/10/2017    Menopausal flushing 08/30/2021    Mitral valve regurgitation 07/06/2015    Mixed anxiety and depressive disorder 04/30/2015    Non-small cell lung cancer 08/10/2017    Nonobstructive atherosclerosis of coronary artery 06/02/2019 08/12/2022: CATH: Prashant: NSTEMI assoc with Covid-19: LM:-nl;  LAD: diffuse, Ca++; 30%; CIRC: Dominant. Normal; RCA: small; 50% diffuse, proximal and mid-segment.      NSTEMI (non-ST elevated myocardial infarction) 08/11/2022    Obsessive-compulsive disorder 04/15/2019    Osteoarthritis 05/20/2024    Paroxysmal atrial fibrillation 05/11/2017    Paroxysmal supraventricular tachycardia 05/11/2017    Peripheral neuropathy 08/11/2014    Personal history of peptic ulcer disease 11/12/2020    Postoperative anemia due to acute blood loss 05/22/2013    Right wrist fracture 03/01/2022    Seasonal allergies 08/10/2017    Secondary hyperparathyroidism of renal origin 09/14/2015    Tricuspid valve regurgitation 03/15/2017    Ulcer of lower extremity  08/30/2021    Unspecified acute appendicitis 03/03/2022    Valvular heart disease 05/20/2024    Wegener's granulomatosis with renal involvement 03/03/2022     Past Surgical History:   Procedure Laterality Date    APPENDECTOMY      ARTERIOVENOUS FISTULA/SHUNT SURGERY Right 12/3/2024    Procedure: FISTULOGRAM  and angioplasty RIGHT ARM;  Surgeon: Paulino Alva II, MD;  Location: [facility] HYBRID OR;  Service: Vascular;  Laterality: Right;    BREAST SURGERY Left     cysts rmeoved    CARDIAC CATHETERIZATION Right 08/12/2022    Procedure: Left Heart Cath and coronary angiogram;  Surgeon: Jak Gavin MD;  Location: [facility] CATH INVASIVE LOCATION;  Service: Cardiology;  Laterality: Right;    CLOSED REDUCTION WRIST FRACTURE Right 03/01/2022    Procedure: WRIST CLOSED REDUCTION;  Surgeon: Gaudencio Townsend MD;  Location: [facility] MAIN OR;  Service: Orthopedics;  Laterality: Right;    CYSTOSCOPY      HIP ARTHROPLASTY      HYSTERECTOMY      LUNG LOBECTOMY Right     TRANSPLANTATION RENAL       Family History   Problem Relation Age of Onset    No Known Problems Mother     No Known Problems Father      Social History     Tobacco Use    Smoking status: Former     Passive exposure: Current    Smokeless tobacco: Never   Vaping Use    Vaping status: Former   Substance Use Topics    Alcohol use: Never    Drug use: Never     (Not in a hospital admission)    Allergies:  Zolpidem    Objective     Vital Signs  Temp:  [97.7 °F (36.5 °C)-98.7 °F (37.1 °C)] 97.7 °F (36.5 °C)  Heart Rate:  [] 99  Resp:  [14-16] 14  BP: ()/(42-70) 89/60     Physical Exam:      General Appearance:    Lethargic, opens eyes with repeated verbal stimuli, slow to follow commands cooperative, in no acute distress   Head:    Normocephalic, without obvious abnormality, atraumatic   Eyes:            Lids and lashes normal, conjunctivae and sclerae normal, no   icterus, no pallor, corneas clear, PERRLA   Ears:    Ears appear intact with no abnormalities noted    Throat:   No oral lesions, no thrush, oral mucosa moist   Neck:   No adenopathy, supple, trachea midline, no thyromegaly, no   carotid bruit, no JVD   Lungs:     Clear to auscultation, diminished basesrespirations regular, even and                  unlabored    Heart:    Irregular rhythm and normal rate, normal S1 and S2,          murmur, no gallop, no rub, no click   Chest Wall:    No abnormalities observed   Abdomen:     Normal bowel sounds, no masses, no organomegaly, soft   obese     non-tender, non-distended, no guarding, no rebound                tenderness   Extremities:   Moves all extremities well, 4+ edema, RUE no cyanosis, no             redness, extremities cool, cap refill 3 sec., BLE wrapped.   Pulses:   Pulses palpable and equal bilaterally; MARILU fistula with thrill and bruit   Skin:   No bleeding, or rash Scatter bruising   Lymph nodes:   No palpable adenopathy   Neurologic:    sensation intact, Oriented to person.       Results Review:     Imaging Results (Last 24 Hours)       Procedure Component Value Units Date/Time    XR Chest 1 View [[id number]] Collected: 03/03/25 1752     Updated: 03/03/25 1800    Narrative:      XR CHEST 1 VW    Date of Exam: 3/3/2025 5:28 PM EST    Indication: sob    Comparison: Single view chest radiographs dated 3/1/2025 and 12/1/2024    Findings:  there are layering bilateral pleural effusions which obscure both diaphragms. Pulmonary vascularity is prominent. Prominent cardiac silhouette and senescent change in the thoracic aorta are present. Cardiac hilar mediastinal silhouettes are unchanged. No   pneumothorax. Trachea is midline.         Impression:      Impression:  Cardiomegaly with layering bilateral pleural effusions prominent pulmonary vascularity and suspected pulmonary edema. No significant radiographic change from 2 days ago.        Electronically Signed: Cristopher August DO    3/3/2025 5:58 PM EST    Workstation ID: [id number]             Lab Results (last 24  hours)       Procedure Component Value Units Date/Time    Hemoglobin A1c [[id number]]  (Normal) Collected: 03/04/25 0124    Specimen: Blood from Arm, Left Updated: 03/04/25 0241     Hemoglobin A1C 5.06 %     POCT Electrolytes +HGB +HCT [[id number]]  (Abnormal) Collected: 03/04/25 0231    Specimen: Arterial Blood Updated: 03/04/25 0234     Sodium 138 mmol/L      POC Potassium 4.8 mmol/L      Ionized Calcium 1.39 mmol/L      Comment: Serial Number: [id number]:  [id number]        Glucose 141 mg/dL      Hematocrit 29 %      Hemoglobin 10.0 g/dL     POC Lactate [[id number]]  (Normal) Collected: 03/04/25 0231    Specimen: Arterial Blood Updated: 03/04/25 0234     Lactate 1.0 mmol/L      Comment: Serial Number: [id number]:  [id number]       POC Glucose Once [[id number]]  (Abnormal) Collected: 03/04/25 0231    Specimen: Arterial Blood Updated: 03/04/25 0234     Glucose 141 mg/dL      Comment: Serial Number: [id number]:  [id number]       Blood Gas, Arterial - [[id number]]  (Abnormal) Collected: 03/04/25 0231    Specimen: Arterial Blood Updated: 03/04/25 0234     Site Left Radial     Orion's Test Positive     pH, Arterial 7.291 pH units      pCO2, Arterial 69.0 mm Hg      pO2, Arterial 82.0 mm Hg      HCO3, Arterial 33.3 mmol/L      Base Excess, Arterial 5.2 mmol/L      Comment: Serial Number: [id number]:  [id number]        O2 Saturation, Arterial 94.0 %      Barometric Pressure for Blood Gas --     Comment: N/A        Modality Cannula     FIO2 40 %      Hemodilution No     PO2/FIO2 205    POC Creatinine [[id number]]  (Abnormal) Collected: 03/04/25 0231    Specimen: Arterial Blood Updated: 03/04/25 0234     Creatinine 2.07 mg/dL      Comment: Serial Number: [id number]:  [id number]        eGFR 24.1 mL/min/1.73     Basic Metabolic Panel [[id number]] Updated: 03/04/25 0217    Specimen: Blood     Magnesium [[id number]] Updated: 03/04/25 0217    Specimen: Blood     Cortisol [[id number]] Collected: 03/04/25 0124    Specimen: Blood Updated:  03/04/25 0210     Cortisol 1.83 mcg/dL     Narrative:      Cortisol Reference Ranges:    Cortisol 6AM - 10AM Range: 6.02-18.40 mcg/dl  Cortisol 4PM - 8PM Range: 2.68-10.50 mcg/dl      Results may be falsely increased if patient taking Biotin.      Phosphorus [[id number]]  (Abnormal) Collected: 03/04/25 0124    Specimen: Blood Updated: 03/04/25 0210     Phosphorus 4.6 mg/dL     Ammonia [[id number]]  (Normal) Collected: 03/04/25 0124    Specimen: Blood from Arm, Left Updated: 03/04/25 0152     Ammonia 22 umol/L     CBC & Differential [[id number]]  (Abnormal) Collected: 03/04/25 0124    Specimen: Blood from Arm, Left Updated: 03/04/25 0139    Narrative:      The following orders were created for panel order CBC & Differential.  Procedure                               Abnormality         Status                     ---------                               -----------         ------                     CBC Auto Differential[[id number]]        Abnormal            Final result                 Please view results for these tests on the individual orders.    CBC Auto Differential [[id number]]  (Abnormal) Collected: 03/04/25 0124    Specimen: Blood from Arm, Left Updated: 03/04/25 0139     WBC 8.18 10*3/mm3      RBC 3.05 10*6/mm3      Hemoglobin 9.1 g/dL      Hematocrit 31.5 %      .3 fL      MCH 29.8 pg      MCHC 28.9 g/dL      RDW 17.2 %      RDW-SD 64.2 fl      MPV 9.2 fL      Platelets 204 10*3/mm3      Neutrophil % 69.2 %      Lymphocyte % 15.3 %      Monocyte % 11.1 %      Eosinophil % 3.3 %      Basophil % 0.6 %      Immature Grans % 0.5 %      Neutrophils, Absolute 5.66 10*3/mm3      Lymphocytes, Absolute 1.25 10*3/mm3      Monocytes, Absolute 0.91 10*3/mm3      Eosinophils, Absolute 0.27 10*3/mm3      Basophils, Absolute 0.05 10*3/mm3      Immature Grans, Absolute 0.04 10*3/mm3      nRBC 0.4 /100 WBC     Tacrolimus Level [[id number]] Collected: 03/04/25 0124    Specimen: Blood from Arm, Right Updated: 03/04/25 0132     TSH [[id number]]  (Abnormal) Collected: 03/03/25 1839    Specimen: Blood from Arm, Left Updated: 03/03/25 2050     TSH 33.000 uIU/mL     T4, Free [[id number]]  (Abnormal) Collected: 03/03/25 1839    Specimen: Blood from Arm, Left Updated: 03/03/25 2050     Free T4 0.30 ng/dL     Urine Culture - Urine, Straight Cath [[id number]] Collected: 03/03/25 1733    Specimen: Urine from Straight Cath Updated: 03/03/25 1923    High Sensitivity Troponin T 1Hr [[id number]]  (Abnormal) Collected: 03/03/25 1839    Specimen: Blood from Arm, Left Updated: 03/03/25 1907     HS Troponin T 49 ng/L      Troponin T Numeric Delta -5 ng/L      Troponin T % Delta -9    Narrative:      High Sensitive Troponin T Reference Range:  <14.0 ng/L- Negative Female for AMI  <22.0 ng/L- Negative Male for AMI  >=14 - Abnormal Female indicating possible myocardial injury.  >=22 - Abnormal Male indicating possible myocardial injury.   Clinicians would have to utilize clinical acumen, EKG, Troponin, and serial changes to determine if it is an Acute Myocardial Infarction or myocardial injury due to an underlying chronic condition.         Extra Tubes [[id number]] Collected: 03/03/25 1839    Specimen: Blood from Arm, Left Updated: 03/03/25 1845    Narrative:      The following orders were created for panel order Extra Tubes.  Procedure                               Abnormality         Status                     ---------                               -----------         ------                     Gold Top - SST[[id number]]                                   Final result                 Please view results for these tests on the individual orders.    Gold Top - SST [[id number]] Collected: 03/03/25 1839    Specimen: Blood from Arm, Left Updated: 03/03/25 1845     Extra Tube Hold for add-ons.     Comment: Auto resulted.       Urinalysis, Microscopic Only - Straight Cath [[id number]]  (Abnormal) Collected: 03/03/25 1733    Specimen: Urine from Straight Cath Updated:  03/03/25 1819     RBC, UA 0-2 /HPF      WBC, UA Too Numerous to Count /HPF      Bacteria, UA 4+ /HPF      Squamous Epithelial Cells, UA 0-2 /HPF      Hyaline Casts, UA 13-20 /LPF      Methodology Manual Light Microscopy    Comprehensive Metabolic Panel [[id number]]  (Abnormal) Collected: 03/03/25 1712    Specimen: Blood Updated: 03/03/25 1741     Glucose 134 mg/dL      BUN 48 mg/dL      Creatinine 2.01 mg/dL      Sodium 135 mmol/L      Potassium 5.3 mmol/L      Chloride 97 mmol/L      CO2 30.8 mmol/L      Calcium 9.6 mg/dL      Total Protein 6.4 g/dL      Albumin 3.1 g/dL      ALT (SGPT) 5 U/L      AST (SGOT) 15 U/L      Alkaline Phosphatase 85 U/L      Total Bilirubin 0.4 mg/dL      Globulin 3.3 gm/dL      A/G Ratio 0.9 g/dL      BUN/Creatinine Ratio 23.9     Anion Gap 7.2 mmol/L      eGFR 25.0 mL/min/1.73     Narrative:      GFR Categories in Chronic Kidney Disease (CKD)      GFR Category          GFR (mL/min/1.73)    Interpretation  G1                     90 or greater         Normal or high (1)  G2                      60-89                Mild decrease (1)  G3a                   45-59                Mild to moderate decrease  G3b                   30-44                Moderate to severe decrease  G4                    15-29                Severe decrease  G5                    14 or less           Kidney failure          (1)In the absence of evidence of kidney disease, neither GFR category G1 or G2 fulfill the criteria for CKD.    eGFR calculation 2021 CKD-EPI creatinine equation, which does not include race as a factor    Urinalysis With Microscopic If Indicated (No Culture) - Straight Cath [[id number]]  (Abnormal) Collected: 03/03/25 1733    Specimen: Urine from Straight Cath Updated: 03/03/25 1741     Color, UA Dark Yellow     Appearance, UA Cloudy     pH, UA 6.0     Specific Gravity, UA 1.017     Glucose, UA Negative     Ketones, UA Trace     Bilirubin, UA Negative     Blood, UA Moderate (2+)     Protein, UA  30 mg/dL (1+)     Leuk Esterase, UA Large (3+)     Nitrite, UA Negative     Urobilinogen, UA 1.0 E.U./dL    High Sensitivity Troponin T [[id number]]  (Abnormal) Collected: 03/03/25 1712    Specimen: Blood Updated: 03/03/25 1739     HS Troponin T 54 ng/L     Narrative:      High Sensitive Troponin T Reference Range:  <14.0 ng/L- Negative Female for AMI  <22.0 ng/L- Negative Male for AMI  >=14 - Abnormal Female indicating possible myocardial injury.  >=22 - Abnormal Male indicating possible myocardial injury.   Clinicians would have to utilize clinical acumen, EKG, Troponin, and serial changes to determine if it is an Acute Myocardial Infarction or myocardial injury due to an underlying chronic condition.         BNP [[id number]]  (Abnormal) Collected: 03/03/25 1712    Specimen: Blood Updated: 03/03/25 1736     proBNP 17,411.0 pg/mL     Narrative:      This assay is used as an aid in the diagnosis of individuals suspected of having heart failure. It can be used as an aid in the diagnosis of acute decompensated heart failure (ADHF) in patients presenting with signs and symptoms of ADHF to the emergency department (ED). In addition, NT-proBNP of <300 pg/mL indicates ADHF is not likely.    Age Range Result Interpretation  NT-proBNP Concentration (pg/mL:      <50             Positive            >450                   Gray                 300-450                    Negative             <300    50-75           Positive            >900                  Gray                300-900                  Negative            <300      >75             Positive            >1800                  Gray                300-1800                  Negative            <300    Blood Culture - Blood, Arm, Left [[id number]] Collected: 03/03/25 1716    Specimen: Blood from Arm, Left Updated: 03/03/25 1721    Blood Culture - Blood, Arm, Left [[id number]] Collected: 03/03/25 1716    Specimen: Blood from Arm, Left Updated: 03/03/25 1721    CBC &  Differential [[id number]]  (Abnormal) Collected: 03/03/25 1712    Specimen: Blood Updated: 03/03/25 1717    Narrative:      The following orders were created for panel order CBC & Differential.  Procedure                               Abnormality         Status                     ---------                               -----------         ------                     CBC Auto Differential[[id number]]        Abnormal            Final result                 Please view results for these tests on the individual orders.    CBC Auto Differential [[id number]]  (Abnormal) Collected: 03/03/25 1712    Specimen: Blood Updated: 03/03/25 1717     WBC 9.73 10*3/mm3      RBC 3.17 10*6/mm3      Hemoglobin 9.4 g/dL      Hematocrit 32.8 %      .5 fL      MCH 29.7 pg      MCHC 28.7 g/dL      RDW 17.1 %      RDW-SD 64.5 fl      MPV 9.0 fL      Platelets 234 10*3/mm3      Neutrophil % 72.7 %      Lymphocyte % 14.3 %      Monocyte % 8.5 %      Eosinophil % 3.5 %      Basophil % 0.5 %      Immature Grans % 0.5 %      Neutrophils, Absolute 7.07 10*3/mm3      Lymphocytes, Absolute 1.39 10*3/mm3      Monocytes, Absolute 0.83 10*3/mm3      Eosinophils, Absolute 0.34 10*3/mm3      Basophils, Absolute 0.05 10*3/mm3      Immature Grans, Absolute 0.05 10*3/mm3      nRBC 0.2 /100 WBC     [place] Draw [[id number]] Collected: 03/03/25 1712    Specimen: Blood Updated: 03/03/25 1715    Narrative:      The following orders were created for panel order [place] Draw.  Procedure                               Abnormality         Status                     ---------                               -----------         ------                     Green Top (Gel)[[id number]]                                  Final result               Lavender Top[[id number]]                                     Final result               Gold Top - SST[[id number]]                                   Final result               Light Blue Top[[id number]]                                    Final result                 Please view results for these tests on the individual orders.    Green Top (Gel) [[id number]] Collected: 03/03/25 1712    Specimen: Blood Updated: 03/03/25 1715     Extra Tube Hold for add-ons.     Comment: Auto resulted.       Lavender Top [[id number]] Collected: 03/03/25 1712    Specimen: Blood Updated: 03/03/25 1715     Extra Tube hold for add-on     Comment: Auto resulted       Gold Top - SST [[id number]] Collected: 03/03/25 1712    Specimen: Blood Updated: 03/03/25 1715     Extra Tube Hold for add-ons.     Comment: Auto resulted.       Light Blue Top [[id number]] Collected: 03/03/25 1712    Specimen: Blood Updated: 03/03/25 1715     Extra Tube Hold for add-ons.     Comment: Auto resulted       POC Lactate [[id number]]  (Normal) Collected: 03/03/25 1709    Specimen: Blood Updated: 03/03/25 1711     Lactate 0.6 mmol/L      Comment: Serial Number: [id number]:  [id number]       POC Glucose Once [[id number]]  (Abnormal) Collected: 03/03/25 1638    Specimen: Blood Updated: 03/03/25 1640     Glucose 124 mg/dL      Comment: Serial Number: [id number]:  [id number]                I reviewed the patient's new clinical results.    Assessment & Plan       UTI (urinary tract infection)    COPD (chronic obstructive pulmonary disease)    Hypothyroidism, unspecified    History of kidney transplant    Long term (current) use of immunosuppressive biologic    Long term current use of anticoagulant therapy    History of DVT (deep vein thrombosis)    Pulmonary hypertension    Edema of both lower extremities due to peripheral venous insufficiency    Mixed anxiety and depressive disorder    Essential (primary) hypertension    Paroxysmal atrial fibrillation    Acute exacerbation of CHF (congestive heart failure)    End stage renal disease    Edema of right upper arm    TONYA (acute kidney injury)    UTI   -culture pending   -Rocephin    ESRD, volume overload   -consult nephrology, may need dialysis  resumed   -1x dose of albumin with lasix   -midodrine tid    CHF   -consider cardiolgoy consult.    RUE edema   -HX of RUE DVT, doppler arm   -Eliquis    Hypothyroidism. TSH 33, T4 0.30   -increased Synthroid to 125 mcg   -check cortisol level    H/o renal transplant   -check prograf level    Mood disorder   -Seroquel on hold due to lethargy   -cymbalta   -hydroxyzine prn    0230: VBG obtained, initiating Airvo and recheck VBG in 1 hour.  Giving additional 50gm of albumin and 5mg of midodrine.  Discussed with ICU NP Jade.  If BP or hypoxia not improved, she will  transfer to intensivist service.    VTE: on Eliquis  GI: protonix  Code status: DNR/DNI per discussion with son, Aris.    I discussed the patient's findings and my recommendations with patient.     Lana Richardson, APRN  03/04/25  02:48 EST

## 2025-03-04 NOTE — CONSULTS
Critical Care Consult Note   Jaja Carcamo : 1945 MRN:[id number] LOS:1 ROOM: 2307/1     Reason for admission: UTI (urinary tract infection)     Assessment / Plan     Hypotension  Received both 5% and 25% albumin doses  Continue midodrine, increased to 15mg q8h  Transferring to ICU for vasopressor support, titrate for a target MAP of 65  Suspect 2/2 volume overload and heart failure  +coronavirus 3/1/25, may be a contributing factor    HFpEF with exacerbation  Severe pulmonary hypertension  Mitral regurgitation  Paroxysmal atrial fibrillation  HF currently decompensated  CXR: layering bilateral pleural effusions with prominent pulmonary vasculature, suspicious for pulmonary edema.  BNP: 17,411  Last ECHO showed EF of 70% (2024)  Rate controlled well with metoprolol  Takes Lasix at home, no antiarrhythmics  [id number] Score of at least 6. Currently on anticoagulation with Eliquis.  Closely monitor I/O, will require further diuresis    Acute respiratory failure with hypoxia and hypercapnia  Likely due to HF exacerbation, complicated by coronavirus  CXR: layering bilateral pleural effusions with prominent pulmonary vasculature, suspicious for pulmonary edema.  Latest AB.232/78.6/33.1 - on airvo when drawn  BiPAP ordered, settings noted and adjusted as needed.   Close monitoring of ABG as ordered    Urinary tract infection  Sepsis criteria not met, only HR>90  UA: Moderate blood, large amount of leukocytes, proteinuria, too numerous to count WBC, 4+ bacteria  WBC and lactate WNL  Blood / urine cultures pending   Started on ceftriaxone     Acute Kidney Injury  S/p kidney transplant  Oliguric at baseline. Baseline creatinine around 1.9  Likely prerenal. Possible intrinsic component due to ATN from hypotension.   Monitor Input/Output very closely  Avoids NSAIDs, nephrotoxic medications, and hypotension  Nephrology consulted  Continue tacrolimus    Hypothyroidism  On synthroid, concern for doses not  "being given or taken at [facility] facility  TSH: 33, T4: 0.3 on admit  Continue synthroid    Anxiety/depression  Continue cymbalta  Seroquel on hold d/t lethargy, resume when appropriate    Hx DVT of RUE December 2024  RUE continues to have significant swelling  Venous duplex ordered      Medical Intervention Limits: No intubation (DNI)  Level Of Support Discussed With: Next of Kin (If No Surrogate)  Code Status (Patient has no pulse and is not breathing): No CPR (Do Not Attempt to Resuscitate)  Medical Interventions (Patient has pulse or is breathing): Limited Support       Nutrition: Diet: Cardiac; Healthy Heart (2-3 Na+); Fluid Consistency: Thin (IDDSI 0) Patient isn't on Tube Feeding     VTE Prophylaxis:  Pharmacologic & mechanical VTE prophylaxis orders are present.         History of Present illness     Jaja Carcamo is a 80 y.o. old female patient with PMH of atrial fibrillation on Eliquis, DVT, pulmonary hypertension, COPD, history of kidney transplant, hypothyroidism, and unspecified heart failure presents to the hospital from [facility] with complaints of tiredness and hypotension.  She was seen in the ED on 3/1/2025, diagnosed with a UTI, and prescribed Augmentin.  Since then she has become more lethargic. Upon exam generalized anasarca is noted with the right upper extremity being significantly more edematous than the left.  It appears that she was hospitalized and treated for an acute right arm DVT in November - December 2024.  Per her son she is primarily bedbound and gets her legs wrapped for swelling.  He states that she has had significant swelling in the right upper extremity.  Her nephrologist is Dr. Falcon and the son states that she has \"not needed dialysis in a while.\" Per patient's son she makes urine about 1-2 times per week and he confirms that she is a DNR/DNI.  Labs remarkable for BNP of 17,411, HS troponin 54, repeat 49, potassium 5.3, creatinine 2.01, GFR 23.9, TSH 33, " and free T4 0.3.  Chest x-ray shows layering bilateral pleural effusions with prominent pulmonary vasculature, suspicious for pulmonary edema.  She was initially admitted to the hospitalist with a principal diagnosis of UTI.  She received 1 dose of 5% albumin, 50 g / 25% albumin, ceftriaxone, 40 mg Lasix, and Synthroid.    In the early AM 3/4/2025 the hospitalist reached out to discuss this patient's case.  Recommendations for managing hypotension were given.  A couple of hours later intensivist team was consulted for worsening hypercapnic respiratory failure and hypotension.  She was then transferred to the ICU.    ACP: Living will on file.  Patient's son Aris is decision-maker.    Patient was seen and examined on 03/04/25 at 06:22 EST .    Past Medical/Surgical/Social/Family History & Allergies     Past Medical History:   Diagnosis Date    Allergic rhinitis 04/14/2015    Asthma 08/10/2017    Atrial flutter 05/11/2017    Chronic diarrhea 04/15/2019    Chronic hypoxemic respiratory failure 01/18/2024    Chronic pain 02/03/2015    COPD (chronic obstructive pulmonary disease)     COVID-19 virus detected 08/11/2022    Cytokine release syndrome, grade 1 08/16/2022    Edema of both lower extremities due to peripheral venous insufficiency 03/12/2021    ESRD on hemodialysis 05/22/2013    Essential (primary) hypertension 03/03/2022    Fracture of ulnar styloid 05/19/2022    Fracture of unspecified carpal bone, right wrist, subsequent encounter for fracture with routine healing 03/03/2022    Gastroesophageal reflux disease 11/12/2020    Gout 08/10/2017    Hearing loss 08/10/2017    History of appendectomy     History of DVT (deep vein thrombosis) 11/12/2020    History of kidney transplant 06/30/2017    History of lobectomy of lung 01/18/2024 03/08/2016:  RIGHT Lower Lobe Mass--> Right Video-assisted thoracoscopy with a moderate-to-large wedge resection of the RLL (by Dr. Haris Mcconnell @ [facility])--> Poorly  differentiated carcinoma of the RLL.      History of repair of hip joint 05/21/2013    History of suicide attempt 05/20/2024    Hyperlipidemia 08/11/2014    Hypothyroidism, unspecified 03/03/2022    Infection due to extended spectrum beta lactamase (ESBL) producing bacteria 03/11/2016    No A2K system hx. +ESBL E coli urine on 3/11/16.      Irritable bowel syndrome 04/15/2019    Long term (current) use of immunosuppressive biologic 04/28/2021    Long term current use of anticoagulant therapy 01/18/2024    Malignant neoplasm of lung 08/10/2017    Menopausal flushing 08/30/2021    Mitral valve regurgitation 07/06/2015    Mixed anxiety and depressive disorder 04/30/2015    Non-small cell lung cancer 08/10/2017    Nonobstructive atherosclerosis of coronary artery 06/02/2019 08/12/2022: CATH: Prashant: NSTEMI assoc with Covid-19: LM:-nl;  LAD: diffuse, Ca++; 30%; CIRC: Dominant. Normal; RCA: small; 50% diffuse, proximal and mid-segment.      NSTEMI (non-ST elevated myocardial infarction) 08/11/2022    Obsessive-compulsive disorder 04/15/2019    Osteoarthritis 05/20/2024    Paroxysmal atrial fibrillation 05/11/2017    Paroxysmal supraventricular tachycardia 05/11/2017    Peripheral neuropathy 08/11/2014    Personal history of peptic ulcer disease 11/12/2020    Postoperative anemia due to acute blood loss 05/22/2013    Right wrist fracture 03/01/2022    Seasonal allergies 08/10/2017    Secondary hyperparathyroidism of renal origin 09/14/2015    Tricuspid valve regurgitation 03/15/2017    Ulcer of lower extremity 08/30/2021    Unspecified acute appendicitis 03/03/2022    Valvular heart disease 05/20/2024    Wegener's granulomatosis with renal involvement 03/03/2022      Past Surgical History:   Procedure Laterality Date    APPENDECTOMY      ARTERIOVENOUS FISTULA/SHUNT SURGERY Right 12/3/2024    Procedure: FISTULOGRAM  and angioplasty RIGHT ARM;  Surgeon: Paulino Alva II, MD;  Location: [facility];  Service:  Vascular;  Laterality: Right;    BREAST SURGERY Left     cysts rmeoved    CARDIAC CATHETERIZATION Right 08/12/2022    Procedure: Left Heart Cath and coronary angiogram;  Surgeon: Jak Gavin MD;  Location: [facility] CATH INVASIVE LOCATION;  Service: Cardiology;  Laterality: Right;    CLOSED REDUCTION WRIST FRACTURE Right 03/01/2022    Procedure: WRIST CLOSED REDUCTION;  Surgeon: Gaudencio Townsend MD;  Location: [facility] MAIN OR;  Service: Orthopedics;  Laterality: Right;    CYSTOSCOPY      HIP ARTHROPLASTY      HYSTERECTOMY      LUNG LOBECTOMY Right     TRANSPLANTATION RENAL        Social History     Socioeconomic History    Marital status:    Tobacco Use    Smoking status: Former     Passive exposure: Current    Smokeless tobacco: Never   Vaping Use    Vaping status: Former   Substance and Sexual Activity    Alcohol use: Never    Drug use: Never    Sexual activity: Defer      Family History   Problem Relation Age of Onset    No Known Problems Mother     No Known Problems Father       Allergies   Allergen Reactions    Zolpidem Other (See Comments), Unknown (See Comments) and Unknown - High Severity     KEPT HER AWAKE  KEPT HER AWAKE  KEPT HER AWAKE  KEPT HER AWAKE          Home Medications     Prior to Admission medications    Medication Sig Start Date End Date Taking? Authorizing Provider   acetaminophen (Tylenol) 325 MG tablet Take 2 tablets by mouth Every 4 (Four) Hours As Needed for Fever or Mild Pain. Indications: Pain 3/13/20   ProviderCourt MD   albuterol sulfate  (90 Base) MCG/ACT inhaler Inhale 2 puffs Every 6 (Six) Hours As Needed for Wheezing.    ProviderCourt MD   amoxicillin-clavulanate (AUGMENTIN) 875-125 MG per tablet Take 1 tablet by mouth 2 (Two) Times a Day. 3/1/25   Lorenzo Morales MD   apixaban (ELIQUIS) 5 MG tablet tablet Take 1 tablet by mouth Every 12 (Twelve) Hours. 8/16/22   Clyde White DO   colestipol (Colestid) 1 g tablet Take 1 tablet by mouth Daily.  Indications: High Amount of Cholesterol in the Blood 7/11/24   Court Vences MD   DULoxetine HCl 40 MG capsule delayed-release particles Take 1 capsule by mouth Daily. Indications: Major Depressive Disorder 7/11/24   Court Vences MD   furosemide (LASIX) 40 MG tablet Take 1 tablet by mouth Daily for 30 days. 12/20/24 1/19/25  Елена Russell MD   hydrOXYzine (ATARAX) 25 MG tablet Take 1 tablet by mouth Every Night.    Court Vences MD   levothyroxine (SYNTHROID, LEVOTHROID) 100 MCG tablet Take 1 tablet by mouth Daily. Indications: Underactive Thyroid 7/11/24   Court Vences MD   metoprolol tartrate (LOPRESSOR) 25 MG tablet Take 0.5 tablets by mouth Every 12 (Twelve) Hours for 30 days. 12/19/24 1/18/25  Елена Russell MD   midodrine (PROAMATINE) 5 MG tablet Take 3 tablets by mouth 3 (Three) Times a Day Before Meals for 30 days. 12/19/24 1/18/25  Елена Russell MD   mirtazapine (REMERON) 15 MG tablet Take 1 tablet by mouth Every Night.    Court Vences MD   O2 (OXYGEN) Inhale 3 L/min Continuous. Indications: Prevention of COPD Worsening 7/11/24   Court Vences MD   ondansetron (ZOFRAN) 4 MG tablet Take 1 tablet by mouth Every 8 (Eight) Hours As Needed for Nausea or Vomiting.    Court Vences MD   polyethylene glycol (MiraLax) 17 GM/SCOOP powder Take 17 g by mouth Daily.    Court Vences MD   predniSONE (DELTASONE) 5 MG tablet Take 1 tablet by mouth Daily. Indications: ACUTE EXACERBATION OF COPD (INACTIVE) 7/11/24   Court Vences MD   QUEtiapine (SEROquel) 25 MG tablet Take 1 tablet by mouth Every Night.    Court Vences MD   sennosides-docusate (PERICOLACE) 8.6-50 MG per tablet Take 1 tablet by mouth 2 (Two) Times a Day.    Court Vences MD   simvastatin (ZOCOR) 20 MG tablet Take 1 tablet by mouth Every Night.    Court Vneces MD   tacrolimus (PROGRAF) 0.5 MG capsule Take 1 capsule by mouth 2  (Two) Times a Day. 6/13/24   Janene Archer TONYA   vitamin B-12 (CYANOCOBALAMIN) 1000 MCG tablet Take 1 tablet by mouth Daily.    Provider, MD Court   vitamin D (ERGOCALCIFEROL) 1.25 MG (67429 UT) capsule capsule Take 1 capsule by mouth 1 (One) Time Per Week.    Provider, MD Court        Objective / Physical Exam     Vital signs:  Temp: 98.3 °F (36.8 °C)  BP: 100/78  Heart Rate: 103  Resp: 18  SpO2: 98 %  Weight: 82.2 kg (181 lb 3.5 oz)    Admission Weight: Weight: 81.6 kg (179 lb 14.3 oz)    Physical Exam  Constitutional:       General: She is not in acute distress.  HENT:      Head: Normocephalic and atraumatic.      Nose: Nose normal.      Mouth/Throat:      Mouth: Mucous membranes are moist.   Eyes:      General: No scleral icterus.     Conjunctiva/sclera: Conjunctivae normal.   Cardiovascular:      Rate and Rhythm: Tachycardia present. Rhythm irregular.   Pulmonary:      Effort: Pulmonary effort is normal.      Breath sounds: Normal breath sounds.   Abdominal:      Palpations: Abdomen is soft.   Musculoskeletal:         General: Swelling present.      Comments: Anasarca  Swelling of RUE>LUE   Skin:     General: Skin is warm.      Comments: Skin is very thin, significant anasarca. Generalized bruising   Neurological:      Mental Status: She is alert.      Comments: Oriented to self          Labs     Results from last 7 days   Lab Units 03/04/25  0231 03/04/25  0124 03/03/25 1712 03/01/25  1002   WBC 10*3/mm3  --  8.18 9.73 9.95   HEMATOCRIT %  --  31.5* 32.8* 32.2*   HEMATOCRIT POC % 29*  --   --   --    PLATELETS 10*3/mm3  --  204 234 185      Results from last 7 days   Lab Units 03/04/25  0307 03/04/25  0231 03/04/25  0124 03/03/25 1712 03/01/25  1002   SODIUM mmol/L 143  --   --  135* 137   POTASSIUM mmol/L 4.8  --   --  5.3* 5.0   CHLORIDE mmol/L 104  --   --  97* 99   CO2 mmol/L 29.9*  --   --  30.8* 30.8*   ANION GAP mmol/L 9.1  --   --  7.2 7.2   BUN mg/dL 48*  --   --  48* 37*    CREATININE mg/dL 1.87* 2.07*  --  2.01* 1.29*   GLUCOSE mg/dL 128*  --   --  134* 114*   PHOSPHORUS mg/dL  --   --  4.6*  --   --    MAGNESIUM mg/dL 2.0  --   --   --   --    ALT (SGPT) U/L  --   --   --  5  --    AST (SGOT) U/L  --   --   --  15  --    ALK PHOS U/L  --   --   --  85  --         Imaging     XR Chest 1 View    Result Date: 3/3/2025  Impression: Cardiomegaly with layering bilateral pleural effusions prominent pulmonary vascularity and suspected pulmonary edema. No significant radiographic change from 2 days ago. Electronically Signed: Cristopher August DO  3/3/2025 5:58 PM EST  Workstation ID: [id number]      Chest X ray: My independent assessment showed layering bilateral pleural effusions with prominent pulmonary vasculature, suspicious for pulmonary edema.    EKG: My independent evaluation showed atrial fibrillation, HR 96,     Current Medications     Scheduled Meds:  apixaban, 5 mg, Oral, Q12H  cefTRIAXone, 1,000 mg, Intravenous, Q24H  cholestyramine light, 1 packet, Oral, Daily  DULoxetine, 40 mg, Oral, Daily  furosemide, 60 mg, Intravenous, Once  levothyroxine, 125 mcg, Oral, Q AM  metoprolol tartrate, 12.5 mg, Oral, Q12H  midodrine, 15 mg, Oral, Q8H  mirtazapine, 15 mg, Oral, Nightly  mupirocin, 1 Application, Each Nare, BID  pantoprazole, 40 mg, Oral, Q AM  polyethylene glycol, 17 g, Oral, Daily  [Held by provider] QUEtiapine, 25 mg, Oral, Nightly  sennosides-docusate, 1 tablet, Oral, BID  sodium chloride, 10 mL, Intravenous, Q12H  sodium chloride, 10 mL, Intravenous, Q12H  tacrolimus, 0.5 mg, Oral, BID  vitamin B-12, 1,000 mcg, Oral, Daily         Continuous Infusions:  norepinephrine, 0.02-0.5 mcg/kg/min     Total critical care time: Approximately 70 minutes    Due to a high probability of clinically significant, life threatening deterioration, the patient required my highest level of preparedness to intervene emergently and I personally spent this critical care time directly and  personally managing the patient. This critical care time included obtaining a history; examining the patient; pulse oximetry; ordering and review of studies; arranging urgent treatment with development of a management plan; evaluation of patient's response to treatment; frequent reassessment; and, discussions with other providers.    This critical care time was performed to assess and manage the high probability of imminent, life-threatening deterioration that could result in multi-organ failure. It was exclusive of separately billable procedures and treating other patients and teaching time.        TONYA De La Cruz   Critical Care  03/04/25   06:22 EST      Addendum: After transfer, patient was able to take midodrine, avoided vasopressor initiation.  Stable for PCU level, downgraded to PCU level.  Discussed with Janene CASTANEDA, on call for Dr. Lowe.    Electronically signed by TONYA Squires, 03/04/25, 10:24 AM EST.

## 2025-03-04 NOTE — PLAN OF CARE
Goal Outcome Evaluation:      Pt was admitted with increased lethargy and unresponsiveness at facility. She started abx a few days ago for UTI. She has become more alert and talkative since being here. Pressures have gotten better but levo is on standby.

## 2025-03-04 NOTE — CONSULTS
RENAL/KCC:    Referring Provider: Dr. 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  She was recently admitted for fluid overload, DVT, Anemia, Hypotension and had TONYA from ATN which required temporary HD.  She was able to discontinue HD shortly after D/C and Cr was 1.2 on last outpatient visit.  She had been scheduled to see Vascular Surgery next month for her RUE swelling.  She presents now with AMS and UTI with hypotension.  Cr was 2 on admission and is 1.8 today.  She is seen in the ICU.  She is more alert than admission.  She is oliguric.   LE's wrapped.  VQ negative.  No RUE DVT.      History  Past Medical History:   Diagnosis Date    Allergic rhinitis 04/14/2015    Asthma 08/10/2017    Atrial flutter 05/11/2017    Chronic diarrhea 04/15/2019    Chronic hypoxemic respiratory failure 01/18/2024    Chronic pain 02/03/2015    COPD (chronic obstructive pulmonary disease)     COVID-19 virus detected 08/11/2022    Cytokine release syndrome, grade 1 08/16/2022    Edema of both lower extremities due to peripheral venous insufficiency 03/12/2021    ESRD on hemodialysis 05/22/2013    Essential (primary) hypertension 03/03/2022    Fracture of ulnar styloid 05/19/2022    Fracture of unspecified carpal bone, right wrist, subsequent encounter for fracture with routine healing 03/03/2022    Gastroesophageal reflux disease 11/12/2020    Gout 08/10/2017    Hearing loss 08/10/2017    History of appendectomy     History of DVT (deep vein thrombosis) 11/12/2020    History of kidney transplant 06/30/2017    History of lobectomy of lung 01/18/2024 03/08/2016:  RIGHT Lower Lobe Mass--> Right Video-assisted thoracoscopy with a moderate-to-large wedge resection of the RLL (by Dr. Haris Mcconnell @ [facility])--> Poorly differentiated carcinoma  of the RLL.      History of repair of hip joint 05/21/2013    History of suicide attempt 05/20/2024    Hyperlipidemia 08/11/2014    Hypothyroidism, unspecified 03/03/2022    Infection due to extended spectrum beta lactamase (ESBL) producing bacteria 03/11/2016    No A2K system hx. +ESBL E coli urine on 3/11/16.      Irritable bowel syndrome 04/15/2019    Long term (current) use of immunosuppressive biologic 04/28/2021    Long term current use of anticoagulant therapy 01/18/2024    Malignant neoplasm of lung 08/10/2017    Menopausal flushing 08/30/2021    Mitral valve regurgitation 07/06/2015    Mixed anxiety and depressive disorder 04/30/2015    Non-small cell lung cancer 08/10/2017    Nonobstructive atherosclerosis of coronary artery 06/02/2019 08/12/2022: CATH: Prashant: NSTEMI assoc with Covid-19: LM:-nl;  LAD: diffuse, Ca++; 30%; CIRC: Dominant. Normal; RCA: small; 50% diffuse, proximal and mid-segment.      NSTEMI (non-ST elevated myocardial infarction) 08/11/2022    Obsessive-compulsive disorder 04/15/2019    Osteoarthritis 05/20/2024    Paroxysmal atrial fibrillation 05/11/2017    Paroxysmal supraventricular tachycardia 05/11/2017    Peripheral neuropathy 08/11/2014    Personal history of peptic ulcer disease 11/12/2020    Postoperative anemia due to acute blood loss 05/22/2013    Right wrist fracture 03/01/2022    Seasonal allergies 08/10/2017    Secondary hyperparathyroidism of renal origin 09/14/2015    Tricuspid valve regurgitation 03/15/2017    Ulcer of lower extremity 08/30/2021    Unspecified acute appendicitis 03/03/2022    Valvular heart disease 05/20/2024    Wegener's granulomatosis with renal involvement 03/03/2022   ,   Past Surgical History:   Procedure Laterality Date    APPENDECTOMY      ARTERIOVENOUS FISTULA/SHUNT SURGERY Right 12/3/2024    Procedure: FISTULOGRAM  and angioplasty RIGHT ARM;  Surgeon: Paulino Alva II, MD;  Location: [facility];  Service: Vascular;  Laterality:  Right;    BREAST SURGERY Left     cysts rmeoved    CARDIAC CATHETERIZATION Right 08/12/2022    Procedure: Left Heart Cath and coronary angiogram;  Surgeon: Jak Gavin MD;  Location: [facility] CATH INVASIVE LOCATION;  Service: Cardiology;  Laterality: Right;    CLOSED REDUCTION WRIST FRACTURE Right 03/01/2022    Procedure: WRIST CLOSED REDUCTION;  Surgeon: Gaudencio Townsend MD;  Location: [facility] MAIN OR;  Service: Orthopedics;  Laterality: Right;    CYSTOSCOPY      HIP ARTHROPLASTY      HYSTERECTOMY      LUNG LOBECTOMY Right     TRANSPLANTATION RENAL     ,   Family History   Problem Relation Age of Onset    No Known Problems Mother     No Known Problems Father    ,   Social History     Socioeconomic History    Marital status:    Tobacco Use    Smoking status: Former     Passive exposure: Current    Smokeless tobacco: Never   Vaping Use    Vaping status: Former   Substance and Sexual Activity    Alcohol use: Never    Drug use: Never    Sexual activity: Defer     E-cigarette/Vaping    E-cigarette/Vaping Use Former User     Passive Exposure No     Counseling Given No      E-cigarette/Vaping Substances    Nicotine No     THC No     CBD No     Flavoring No      E-cigarette/Vaping Devices    Disposable No     Pre-filled or Refillable Cartridge No     Refillable Tank No     Pre-filled Pod No          ,   Medications Prior to Admission   Medication Sig Dispense Refill Last Dose/Taking    amoxicillin-clavulanate (AUGMENTIN) 875-125 MG per tablet Take 1 tablet by mouth 2 (Two) Times a Day. 14 tablet 0 Taking    apixaban (ELIQUIS) 5 MG tablet tablet Take 1 tablet by mouth Every 12 (Twelve) Hours. 60 tablet 0 Taking    DULoxetine HCl 40 MG capsule delayed-release particles Take 1 capsule by mouth Daily. Indications: Major Depressive Disorder   Taking    ferrous sulfate 325 (65 FE) MG tablet Take 1 tablet by mouth 3 times a day.   Taking    furosemide (LASIX) 40 MG tablet Take 1 tablet by mouth 2 (Two) Times a Day.   Taking     hydrOXYzine (ATARAX) 25 MG tablet Take 1 tablet by mouth Every Night.   Taking    LORazepam (ATIVAN) 0.5 MG tablet Take 1 tablet by mouth Every 6 (Six) Hours.   Taking    metoprolol tartrate (LOPRESSOR) 25 MG tablet Take 0.5 tablets by mouth 2 (Two) Times a Day.   Taking    midodrine (PROAMATINE) 5 MG tablet Take 3 tablets by mouth 3 (Three) Times a Day Before Meals.   Taking    mirtazapine (REMERON) 15 MG tablet Take 1 tablet by mouth Every Night.   Taking    polyethylene glycol (MiraLax) 17 GM/SCOOP powder Take 17 g by mouth Daily.   Taking    predniSONE (DELTASONE) 5 MG tablet Take 1 tablet by mouth Daily. Indications: ACUTE EXACERBATION OF COPD (INACTIVE)   Taking    sacubitril-valsartan (Entresto) 24-26 MG tablet Take 1 tablet by mouth 2 (Two) Times a Day.   Taking    sennosides-docusate (PERICOLACE) 8.6-50 MG per tablet Take 1 tablet by mouth 2 (Two) Times a Day.   Taking    simvastatin (ZOCOR) 20 MG tablet Take 1 tablet by mouth Every Night.   Taking    tacrolimus (PROGRAF) 0.5 MG capsule Take 1 capsule by mouth 2 (Two) Times a Day. 60 capsule 1 Taking    vitamin B-12 (CYANOCOBALAMIN) 1000 MCG tablet Take 1 tablet by mouth Daily.   Taking    vitamin D (ERGOCALCIFEROL) 1.25 MG (68190 UT) capsule capsule Take 1 capsule by mouth 1 (One) Time Per Week.   Taking    acetaminophen (Tylenol) 325 MG tablet Take 2 tablets by mouth Every 4 (Four) Hours As Needed for Fever or Mild Pain. Indications: Pain       albuterol sulfate  (90 Base) MCG/ACT inhaler Inhale 2 puffs Every 6 (Six) Hours As Needed for Wheezing.       HYDROcodone-acetaminophen (NORCO)  MG per tablet Take 1 tablet by mouth Every 6 (Six) Hours.      , Scheduled Meds:  apixaban, 5 mg, Oral, Q12H  cefTRIAXone, 1,000 mg, Intravenous, Q24H  DULoxetine, 40 mg, Oral, Daily  furosemide, 60 mg, Intravenous, Once  HYDROcodone-acetaminophen, 1 tablet, Oral, Q6H  [START ON 3/5/2025] levothyroxine, 137 mcg, Oral, Q AM  metoprolol tartrate, 12.5 mg, Oral,  Q12H  midodrine, 15 mg, Oral, Q8H  mirtazapine, 15 mg, Oral, Nightly  mupirocin, 1 Application, Each Nare, BID  pantoprazole, 40 mg, Oral, Q AM  polyethylene glycol, 17 g, Oral, Daily  [Held by provider] sacubitril-valsartan, 1 tablet, Oral, Q12H  sennosides-docusate, 1 tablet, Oral, BID  sodium chloride, 10 mL, Intravenous, Q12H  sodium chloride, 10 mL, Intravenous, Q12H  tacrolimus, 0.5 mg, Oral, BID  vitamin B-12, 1,000 mcg, Oral, Daily   , Continuous Infusions:   , PRN Meds:    acetaminophen **OR** acetaminophen    aluminum-magnesium hydroxide-simethicone    senna-docusate sodium **AND** polyethylene glycol **AND** bisacodyl **AND** bisacodyl    hydrOXYzine    [Held by provider] LORazepam    nitroglycerin    ondansetron ODT **OR** ondansetron    prochlorperazine    [COMPLETED] Insert Peripheral IV **AND** sodium chloride    sodium chloride    sodium chloride    sodium chloride    sodium chloride, and Allergies:  Zolpidem    Review of Systems  Pertinent items are noted in HPI    Objective     Vital Signs  Temp:  [97.7 °F (36.5 °C)-98.7 °F (37.1 °C)] 98 °F (36.7 °C)  Heart Rate:  [] 107  Resp:  [14-18] 18  BP: ()/(42-78) 102/55    No intake/output data recorded.  No intake/output data recorded.    Physical Exam:  GEN: Awake, NAD  ENT: PERRL, EOMI, MMM  NECK: Supple, no JVD  CHEST: CTAB, no W/R/C  CV: RRR, no M/G/R, +edema  ABD: Soft, NT, +BS  SKIN: Warm and Dry  NEURO: CN's intact      Results Review:   I reviewed the patient's new clinical results.    WBC WBC   Date Value Ref Range Status   03/04/2025 8.18 3.40 - 10.80 10*3/mm3 Final   03/03/2025 9.73 3.40 - 10.80 10*3/mm3 Final      HGB Hemoglobin   Date Value Ref Range Status   03/04/2025 10.0 (L) 12.0 - 17.0 g/dL Final   03/04/2025 9.1 (L) 12.0 - 15.9 g/dL Final   03/03/2025 9.4 (L) 12.0 - 15.9 g/dL Final      HCT Hematocrit   Date Value Ref Range Status   03/04/2025 29 (L) 38 - 51 % Final   03/04/2025 31.5 (L) 34.0 - 46.6 % Final   03/03/2025 32.8  "(L) 34.0 - 46.6 % Final      Platlets No results found for: \"LABPLAT\"   MCV MCV   Date Value Ref Range Status   03/04/2025 103.3 (H) 79.0 - 97.0 fL Final   03/03/2025 103.5 (H) 79.0 - 97.0 fL Final          Sodium Sodium   Date Value Ref Range Status   03/04/2025 140 136 - 145 mmol/L Final   03/04/2025 143 136 - 145 mmol/L Final   03/03/2025 135 (L) 136 - 145 mmol/L Final      Potassium Potassium   Date Value Ref Range Status   03/04/2025 4.9 3.5 - 5.2 mmol/L Final   03/04/2025 4.8 3.5 - 5.2 mmol/L Final   03/03/2025 5.3 (H) 3.5 - 5.2 mmol/L Final      Chloride Chloride   Date Value Ref Range Status   03/04/2025 103 98 - 107 mmol/L Final   03/04/2025 104 98 - 107 mmol/L Final   03/03/2025 97 (L) 98 - 107 mmol/L Final      CO2 CO2   Date Value Ref Range Status   03/04/2025 28.3 22.0 - 29.0 mmol/L Final   03/04/2025 29.9 (H) 22.0 - 29.0 mmol/L Final   03/03/2025 30.8 (H) 22.0 - 29.0 mmol/L Final      BUN BUN   Date Value Ref Range Status   03/04/2025 48 (H) 8 - 23 mg/dL Final   03/04/2025 48 (H) 8 - 23 mg/dL Final   03/03/2025 48 (H) 8 - 23 mg/dL Final      Creatinine Creatinine   Date Value Ref Range Status   03/04/2025 1.84 (H) 0.57 - 1.00 mg/dL Final   03/04/2025 1.87 (H) 0.57 - 1.00 mg/dL Final   03/04/2025 2.07 (H) 0.60 - 1.30 mg/dL Final     Comment:     Serial Number: [id number]:  [id number]   03/03/2025 2.01 (H) 0.57 - 1.00 mg/dL Final      Calcium Calcium   Date Value Ref Range Status   03/04/2025 9.5 8.6 - 10.5 mg/dL Final   03/04/2025 9.2 8.6 - 10.5 mg/dL Final   03/03/2025 9.6 8.6 - 10.5 mg/dL Final      PO4 No results found for: \"CAPO4\"   Albumin Albumin   Date Value Ref Range Status   03/04/2025 3.7 3.5 - 5.2 g/dL Final   03/03/2025 3.1 (L) 3.5 - 5.2 g/dL Final      Magnesium Magnesium   Date Value Ref Range Status   03/04/2025 2.0 1.6 - 2.4 mg/dL Final   03/04/2025 2.0 1.6 - 2.4 mg/dL Final      Uric Acid No results found for: \"URICACID\"         apixaban, 5 mg, Oral, Q12H  cefTRIAXone, 1,000 mg, " Intravenous, Q24H  DULoxetine, 40 mg, Oral, Daily  furosemide, 60 mg, Intravenous, Once  HYDROcodone-acetaminophen, 1 tablet, Oral, Q6H  [START ON 3/5/2025] levothyroxine, 137 mcg, Oral, Q AM  metoprolol tartrate, 12.5 mg, Oral, Q12H  midodrine, 15 mg, Oral, Q8H  mirtazapine, 15 mg, Oral, Nightly  mupirocin, 1 Application, Each Nare, BID  pantoprazole, 40 mg, Oral, Q AM  polyethylene glycol, 17 g, Oral, Daily  [Held by provider] sacubitril-valsartan, 1 tablet, Oral, Q12H  sennosides-docusate, 1 tablet, Oral, BID  sodium chloride, 10 mL, Intravenous, Q12H  sodium chloride, 10 mL, Intravenous, Q12H  tacrolimus, 0.5 mg, Oral, BID  vitamin B-12, 1,000 mcg, Oral, Daily           Assessment & Plan     TONYA  CKD3  Kidney Transplant  UTI  Fluid overload  Hypotension  Viral URI  RUE swelling - AVF with thrill and bruit  Anemia    PLAN: Cr narrowly better today at 1.8 from 2 but still above recent baseline of 1.2.  Continue Prograf and check level in AM.  Recently required HD acutely due to ATN from contrast and hypotension.  S/P IV Albumin and on Midodrine for BP support.  Hold on any additional volume today.  Will resume diuretics tomorrow if BP remains stable.  Ask Vascular to evaluate RUE swelling - may need fistulogram.  ABX per primary.  Patient is agreeable to acute HD again if necessary.  Will follow closely.        Naun Falcon MD  [facility]  03/04/25  @NOW

## 2025-03-04 NOTE — CONSULTS
Initial spiritual care visit. Pt in room with female guest at bedside (relation unknown). Pt had no spiritual needs at this time.  offered to return if those needs changed. Pt thankful for offer.

## 2025-03-05 PROBLEM — L89.893 DECUBITUS ULCER OF FOOT, STAGE 3: Status: ACTIVE | Noted: 2021-08-30

## 2025-03-05 PROBLEM — L89.613 PRESSURE INJURY OF RIGHT HEEL, STAGE 3: Status: ACTIVE | Noted: 2025-03-05

## 2025-03-05 LAB
ALBUMIN SERPL-MCNC: 3.1 G/DL (ref 3.5–5.2)
ALBUMIN/GLOB SERPL: 1.1 G/DL
ALP SERPL-CCNC: 69 U/L (ref 39–117)
ALT SERPL W P-5'-P-CCNC: <5 U/L (ref 1–33)
ANION GAP SERPL CALCULATED.3IONS-SCNC: 8.2 MMOL/L (ref 5–15)
AST SERPL-CCNC: 13 U/L (ref 1–32)
BACTERIA SPEC AEROBE CULT: NORMAL
BASOPHILS # BLD AUTO: 0.02 10*3/MM3 (ref 0–0.2)
BASOPHILS NFR BLD AUTO: 0.2 % (ref 0–1.5)
BILIRUB SERPL-MCNC: 0.4 MG/DL (ref 0–1.2)
BUN SERPL-MCNC: 51 MG/DL (ref 8–23)
BUN/CREAT SERPL: 26.7 (ref 7–25)
C AURIS DNA SPEC QL NAA+NON-PROBE: NOT DETECTED
CALCIUM SPEC-SCNC: 9.5 MG/DL (ref 8.6–10.5)
CHLORIDE SERPL-SCNC: 97 MMOL/L (ref 98–107)
CO2 SERPL-SCNC: 26.8 MMOL/L (ref 22–29)
CREAT SERPL-MCNC: 1.91 MG/DL (ref 0.57–1)
DEPRECATED RDW RBC AUTO: 63.5 FL (ref 37–54)
EGFRCR SERPLBLD CKD-EPI 2021: 26.6 ML/MIN/1.73
EOSINOPHIL # BLD AUTO: 0.55 10*3/MM3 (ref 0–0.4)
EOSINOPHIL NFR BLD AUTO: 6.8 % (ref 0.3–6.2)
ERYTHROCYTE [DISTWIDTH] IN BLOOD BY AUTOMATED COUNT: 17.7 % (ref 12.3–15.4)
GLOBULIN UR ELPH-MCNC: 2.7 GM/DL
GLUCOSE SERPL-MCNC: 99 MG/DL (ref 65–99)
HCT VFR BLD AUTO: 28.1 % (ref 34–46.6)
HGB BLD-MCNC: 8.1 G/DL (ref 12–15.9)
IMM GRANULOCYTES # BLD AUTO: 0.05 10*3/MM3 (ref 0–0.05)
IMM GRANULOCYTES NFR BLD AUTO: 0.6 % (ref 0–0.5)
LYMPHOCYTES # BLD AUTO: 0.94 10*3/MM3 (ref 0.7–3.1)
LYMPHOCYTES NFR BLD AUTO: 11.6 % (ref 19.6–45.3)
MAGNESIUM SERPL-MCNC: 2 MG/DL (ref 1.6–2.4)
MCH RBC QN AUTO: 29.7 PG (ref 26.6–33)
MCHC RBC AUTO-ENTMCNC: 28.8 G/DL (ref 31.5–35.7)
MCV RBC AUTO: 102.9 FL (ref 79–97)
MONOCYTES # BLD AUTO: 0.79 10*3/MM3 (ref 0.1–0.9)
MONOCYTES NFR BLD AUTO: 9.7 % (ref 5–12)
NEUTROPHILS NFR BLD AUTO: 5.76 10*3/MM3 (ref 1.7–7)
NEUTROPHILS NFR BLD AUTO: 71.1 % (ref 42.7–76)
NRBC BLD AUTO-RTO: 0 /100 WBC (ref 0–0.2)
PHOSPHATE SERPL-MCNC: 3.3 MG/DL (ref 2.5–4.5)
PLATELET # BLD AUTO: 168 10*3/MM3 (ref 140–450)
PMV BLD AUTO: 9.6 FL (ref 6–12)
POTASSIUM SERPL-SCNC: 5.1 MMOL/L (ref 3.5–5.2)
PROT SERPL-MCNC: 5.8 G/DL (ref 6–8.5)
RBC # BLD AUTO: 2.73 10*6/MM3 (ref 3.77–5.28)
SODIUM SERPL-SCNC: 132 MMOL/L (ref 136–145)
WBC NRBC COR # BLD AUTO: 8.11 10*3/MM3 (ref 3.4–10.8)

## 2025-03-05 PROCEDURE — 99231 SBSQ HOSP IP/OBS SF/LOW 25: CPT | Performed by: NURSE PRACTITIONER

## 2025-03-05 PROCEDURE — 94799 UNLISTED PULMONARY SVC/PX: CPT

## 2025-03-05 PROCEDURE — 80053 COMPREHEN METABOLIC PANEL: CPT

## 2025-03-05 PROCEDURE — 63710000001 TACROLIMUS PER 1 MG: Performed by: INTERNAL MEDICINE

## 2025-03-05 PROCEDURE — 80197 ASSAY OF TACROLIMUS: CPT | Performed by: INTERNAL MEDICINE

## 2025-03-05 PROCEDURE — 25010000002 FUROSEMIDE PER 20 MG: Performed by: INTERNAL MEDICINE

## 2025-03-05 PROCEDURE — 99222 1ST HOSP IP/OBS MODERATE 55: CPT | Performed by: NURSE PRACTITIONER

## 2025-03-05 PROCEDURE — 94761 N-INVAS EAR/PLS OXIMETRY MLT: CPT

## 2025-03-05 PROCEDURE — 83735 ASSAY OF MAGNESIUM: CPT

## 2025-03-05 PROCEDURE — 94660 CPAP INITIATION&MGMT: CPT

## 2025-03-05 PROCEDURE — 85025 COMPLETE CBC W/AUTO DIFF WBC: CPT

## 2025-03-05 PROCEDURE — 84100 ASSAY OF PHOSPHORUS: CPT

## 2025-03-05 PROCEDURE — 25010000002 CEFTRIAXONE PER 250 MG

## 2025-03-05 RX ORDER — FUROSEMIDE 10 MG/ML
40 INJECTION INTRAMUSCULAR; INTRAVENOUS ONCE
Status: COMPLETED | OUTPATIENT
Start: 2025-03-05 | End: 2025-03-05

## 2025-03-05 RX ADMIN — MIDODRINE HYDROCHLORIDE 15 MG: 5 TABLET ORAL at 21:07

## 2025-03-05 RX ADMIN — FUROSEMIDE 40 MG: 10 INJECTION, SOLUTION INTRAMUSCULAR; INTRAVENOUS at 13:50

## 2025-03-05 RX ADMIN — MIDODRINE HYDROCHLORIDE 15 MG: 5 TABLET ORAL at 06:33

## 2025-03-05 RX ADMIN — SENNOSIDES AND DOCUSATE SODIUM 1 TABLET: 50; 8.6 TABLET ORAL at 21:07

## 2025-03-05 RX ADMIN — Medication 10 ML: at 09:13

## 2025-03-05 RX ADMIN — TACROLIMUS 0.5 MG: 0.5 CAPSULE ORAL at 09:13

## 2025-03-05 RX ADMIN — LEVOTHYROXINE SODIUM 137 MCG: 0.03 TABLET ORAL at 06:33

## 2025-03-05 RX ADMIN — Medication 1000 MCG: at 09:13

## 2025-03-05 RX ADMIN — DULOXETINE 40 MG: 20 CAPSULE, DELAYED RELEASE ORAL at 09:12

## 2025-03-05 RX ADMIN — APIXABAN 5 MG: 5 TABLET, FILM COATED ORAL at 21:07

## 2025-03-05 RX ADMIN — Medication 12.5 MG: at 09:13

## 2025-03-05 RX ADMIN — Medication 10 ML: at 21:07

## 2025-03-05 RX ADMIN — HYDROCODONE BITARTRATE AND ACETAMINOPHEN 1 TABLET: 10; 325 TABLET ORAL at 00:03

## 2025-03-05 RX ADMIN — MUPIROCIN 1 APPLICATION: 20 OINTMENT TOPICAL at 09:13

## 2025-03-05 RX ADMIN — HYDROCODONE BITARTRATE AND ACETAMINOPHEN 1 TABLET: 10; 325 TABLET ORAL at 13:50

## 2025-03-05 RX ADMIN — MUPIROCIN 1 APPLICATION: 20 OINTMENT TOPICAL at 21:06

## 2025-03-05 RX ADMIN — HYDROCODONE BITARTRATE AND ACETAMINOPHEN 1 TABLET: 10; 325 TABLET ORAL at 06:33

## 2025-03-05 RX ADMIN — HYDROCODONE BITARTRATE AND ACETAMINOPHEN 1 TABLET: 10; 325 TABLET ORAL at 18:37

## 2025-03-05 RX ADMIN — APIXABAN 5 MG: 5 TABLET, FILM COATED ORAL at 09:13

## 2025-03-05 RX ADMIN — MIDODRINE HYDROCHLORIDE 15 MG: 5 TABLET ORAL at 13:50

## 2025-03-05 RX ADMIN — CEFTRIAXONE SODIUM 1000 MG: 1 INJECTION, POWDER, FOR SOLUTION INTRAMUSCULAR; INTRAVENOUS at 18:37

## 2025-03-05 RX ADMIN — Medication 12.5 MG: at 21:07

## 2025-03-05 RX ADMIN — MIRTAZAPINE 15 MG: 15 TABLET, FILM COATED ORAL at 21:07

## 2025-03-05 RX ADMIN — TACROLIMUS 0.5 MG: 0.5 CAPSULE ORAL at 21:06

## 2025-03-05 NOTE — PROGRESS NOTES
Wound Initial Evaluation   MAYELIN     Patient Name: Jaja Carcamo  : 1945  MRN: [id number]  Today's Date: 3/5/2025 Room Number: 2307/1      Admit Date: 3/3/2025  Attending: Juanis Lowe MD    Consult Requested By: Dr Lowe    Reason For Consult: BLE    Chief Complaint: 80-year-old female presents from her ECF was found unresponsive at her facility.  Patient is awake and alert rather forgetful has family member to the bedside.  Patient has chronic wounds to her feet and legs.  She is seen by lymphedema who wraps her legs daily.  Caregiver is present.  She has photos of the wounds from now until they originated those were shared and noted.    Visit Dx:    ICD-10-CM ICD-9-CM   1. TONYA (acute kidney injury)  N17.9 584.9   2. Acute UTI  N39.0 599.0   3. Hypervolemia, unspecified hypervolemia type  E87.70 276.69       UTI (urinary tract infection)    COPD (chronic obstructive pulmonary disease)    Hypothyroidism, unspecified    History of kidney transplant    Long term (current) use of immunosuppressive biologic    Long term current use of anticoagulant therapy    History of DVT (deep vein thrombosis)    Pulmonary hypertension    Decubitus ulcer of foot, stage 3    Edema of both lower extremities due to peripheral venous insufficiency    Mixed anxiety and depressive disorder    Essential (primary) hypertension    Paroxysmal atrial fibrillation    Acute exacerbation of CHF (congestive heart failure)    End stage renal disease    Edema of right upper arm    TONYA (acute kidney injury)    Pressure injury of right heel, stage 3      History:   Past Medical History:   Diagnosis Date    Allergic rhinitis 2015    Asthma 08/10/2017    Atrial flutter 2017    Chronic diarrhea 04/15/2019    Chronic hypoxemic respiratory failure 2024    Chronic pain 2015    COPD (chronic obstructive pulmonary disease)     COVID-19 virus detected 2022    Cytokine release syndrome, grade 1 2022    Edema of  both lower extremities due to peripheral venous insufficiency 03/12/2021    ESRD on hemodialysis 05/22/2013    Essential (primary) hypertension 03/03/2022    Fracture of ulnar styloid 05/19/2022    Fracture of unspecified carpal bone, right wrist, subsequent encounter for fracture with routine healing 03/03/2022    Gastroesophageal reflux disease 11/12/2020    Gout 08/10/2017    Hearing loss 08/10/2017    History of appendectomy     History of DVT (deep vein thrombosis) 11/12/2020    History of kidney transplant 06/30/2017    History of lobectomy of lung 01/18/2024 03/08/2016:  RIGHT Lower Lobe Mass--> Right Video-assisted thoracoscopy with a moderate-to-large wedge resection of the RLL (by Dr. Haris Mcconnell @ [facility])--> Poorly differentiated carcinoma of the RLL.      History of repair of hip joint 05/21/2013    History of suicide attempt 05/20/2024    Hyperlipidemia 08/11/2014    Hypothyroidism, unspecified 03/03/2022    Infection due to extended spectrum beta lactamase (ESBL) producing bacteria 03/11/2016    No A2K system hx. +ESBL E coli urine on 3/11/16.      Irritable bowel syndrome 04/15/2019    Long term (current) use of immunosuppressive biologic 04/28/2021    Long term current use of anticoagulant therapy 01/18/2024    Malignant neoplasm of lung 08/10/2017    Menopausal flushing 08/30/2021    Mitral valve regurgitation 07/06/2015    Mixed anxiety and depressive disorder 04/30/2015    Non-small cell lung cancer 08/10/2017    Nonobstructive atherosclerosis of coronary artery 06/02/2019 08/12/2022: CATH: Prashant: NSTEMI assoc with Covid-19: LM:-nl;  LAD: diffuse, Ca++; 30%; CIRC: Dominant. Normal; RCA: small; 50% diffuse, proximal and mid-segment.      NSTEMI (non-ST elevated myocardial infarction) 08/11/2022    Obsessive-compulsive disorder 04/15/2019    Osteoarthritis 05/20/2024    Paroxysmal atrial fibrillation 05/11/2017    Paroxysmal supraventricular tachycardia 05/11/2017     Peripheral neuropathy 08/11/2014    Personal history of peptic ulcer disease 11/12/2020    Postoperative anemia due to acute blood loss 05/22/2013    Right wrist fracture 03/01/2022    Seasonal allergies 08/10/2017    Secondary hyperparathyroidism of renal origin 09/14/2015    Tricuspid valve regurgitation 03/15/2017    Ulcer of lower extremity 08/30/2021    Unspecified acute appendicitis 03/03/2022    Valvular heart disease 05/20/2024    Wegener's granulomatosis with renal involvement 03/03/2022     Past Surgical History:   Procedure Laterality Date    APPENDECTOMY      ARTERIOVENOUS FISTULA/SHUNT SURGERY Right 12/3/2024    Procedure: FISTULOGRAM  and angioplasty RIGHT ARM;  Surgeon: Paulino Alva II, MD;  Location: [facility] HYBRID OR;  Service: Vascular;  Laterality: Right;    BREAST SURGERY Left     cysts rmeoved    CARDIAC CATHETERIZATION Right 08/12/2022    Procedure: Left Heart Cath and coronary angiogram;  Surgeon: Jak Gavin MD;  Location: [facility] CATH INVASIVE LOCATION;  Service: Cardiology;  Laterality: Right;    CLOSED REDUCTION WRIST FRACTURE Right 03/01/2022    Procedure: WRIST CLOSED REDUCTION;  Surgeon: Gaudencio Townsend MD;  Location: [facility] MAIN OR;  Service: Orthopedics;  Laterality: Right;    CYSTOSCOPY      HIP ARTHROPLASTY      HYSTERECTOMY      LUNG LOBECTOMY Right     TRANSPLANTATION RENAL       Social History     Socioeconomic History    Marital status:    Tobacco Use    Smoking status: Former     Passive exposure: Current    Smokeless tobacco: Never   Vaping Use    Vaping status: Former   Substance and Sexual Activity    Alcohol use: Never    Drug use: Never    Sexual activity: Defer       Allergies:  Allergies   Allergen Reactions    Zolpidem Other (See Comments), Unknown (See Comments) and Unknown - High Severity     KEPT HER AWAKE  KEPT HER AWAKE  KEPT HER AWAKE  KEPT HER AWAKE         Medications:    Current Facility-Administered Medications:     acetaminophen (TYLENOL) tablet 650 mg,  650 mg, Oral, Q4H PRN **OR** acetaminophen (TYLENOL) suppository 650 mg, 650 mg, Rectal, Q4H PRN, Jade Castaneda APRN    aluminum-magnesium hydroxide-simethicone (MAALOX MAX) 400-400-40 MG/5ML suspension 15 mL, 15 mL, Oral, Q6H PRN, Early, TONYA Lebron    apixaban (ELIQUIS) tablet 5 mg, 5 mg, Oral, Q12H, Juanis Lowe MD, 5 mg at 03/05/25 0913    sennosides-docusate (PERICOLACE) 8.6-50 MG per tablet 2 tablet, 2 tablet, Oral, BID PRN **AND** polyethylene glycol (MIRALAX) packet 17 g, 17 g, Oral, Daily PRN **AND** bisacodyl (DULCOLAX) EC tablet 5 mg, 5 mg, Oral, Daily PRN **AND** bisacodyl (DULCOLAX) suppository 10 mg, 10 mg, Rectal, Daily PRN, Lana Richardson APRN    cefTRIAXone (ROCEPHIN) 1,000 mg in sodium chloride 0.9 % 100 mL MBP, 1,000 mg, Intravenous, Q24H, Lana Richardson APRN, Last Rate: 200 mL/hr at 03/04/25 1721, 1,000 mg at 03/04/25 1721    DULoxetine (CYMBALTA) DR capsule 40 mg, 40 mg, Oral, Daily, Juanis Lowe MD, 40 mg at 03/05/25 0912    HYDROcodone-acetaminophen (NORCO)  MG per tablet 1 tablet, 1 tablet, Oral, Q6H, Juanis Lowe MD, 1 tablet at 03/05/25 1350    hydrOXYzine (ATARAX) tablet 25 mg, 25 mg, Oral, Nightly PRN, Lana Richardson APRN    levothyroxine (SYNTHROID, LEVOTHROID) tablet 137 mcg, 137 mcg, Oral, Q AM, Juanis Lowe MD, 137 mcg at 03/05/25 0633    [Held by provider] LORazepam (ATIVAN) tablet 0.5 mg, 0.5 mg, Oral, Q6H PRN, Juanis Lowe MD    metoprolol tartrate (LOPRESSOR) half tablet 12.5 mg, 12.5 mg, Oral, Q12H, Juanis Lowe MD, 12.5 mg at 03/05/25 0913    midodrine (PROAMATINE) tablet 15 mg, 15 mg, Oral, Q8H, Lana Richardson APRN, 15 mg at 03/05/25 1350    mirtazapine (REMERON) tablet 15 mg, 15 mg, Oral, Nightly, Juanis Lowe MD, 15 mg at 03/04/25 2103    mupirocin (BACTROBAN) 2 % nasal ointment 1 Application, 1 Application, Each Nare, BID, Early, TONYA Lebron, 1 Application at 03/05/25 0913    nitroglycerin (NITROSTAT) SL tablet 0.4 mg, 0.4 mg, Sublingual, Q5 Min PRN,  Lana Richardson APRN    ondansetron ODT (ZOFRAN-ODT) disintegrating tablet 4 mg, 4 mg, Oral, Q6H PRN **OR** ondansetron (ZOFRAN) injection 4 mg, 4 mg, Intravenous, Q6H PRN, Lana Richardson APRN    pantoprazole (PROTONIX) EC tablet 40 mg, 40 mg, Oral, Q AM, Lana Richardson APRN, 40 mg at 03/04/25 0547    polyethylene glycol (MIRALAX) packet 17 g, 17 g, Oral, Daily, Juanis Lowe MD    prochlorperazine (COMPAZINE) injection 2.5 mg, 2.5 mg, Intravenous, Q4H PRN, Early, TONYA Lebron    [Held by provider] sacubitril-valsartan (ENTRESTO) 24-26 MG tablet 1 tablet, 1 tablet, Oral, Q12H, Eligio Bergman APRN    sennosides-docusate (PERICOLACE) 8.6-50 MG per tablet 1 tablet, 1 tablet, Oral, BID, Juanis Lowe MD    [COMPLETED] Insert Peripheral IV, , , Once **AND** sodium chloride 0.9 % flush 10 mL, 10 mL, Intravenous, PRN, Paulino Miller MD    sodium chloride 0.9 % flush 10 mL, 10 mL, Intravenous, Q12H, Lana Richardson APRN, 10 mL at 03/05/25 0913    sodium chloride 0.9 % flush 10 mL, 10 mL, Intravenous, PRN, Lana Richardson APRN    sodium chloride 0.9 % flush 10 mL, 10 mL, Intravenous, Q12H, EarlyJade APRN, 10 mL at 03/05/25 0913    sodium chloride 0.9 % flush 10 mL, 10 mL, Intravenous, PRN, EarlyJade APRN    sodium chloride 0.9 % infusion 40 mL, 40 mL, Intravenous, PRN, Lana Richardson APRN    sodium chloride 0.9 % infusion 40 mL, 40 mL, Intravenous, PRN, EarlyJade APRASHLEIGH    tacrolimus (PROGRAF) capsule 0.5 mg, 0.5 mg, Oral, BID, Juanis Lowe MD, 0.5 mg at 03/05/25 0913    vitamin B-12 (CYANOCOBALAMIN) tablet 1,000 mcg, 1,000 mcg, Oral, Daily, Juanis Lowe MD, 1,000 mcg at 03/05/25 0913    Results Review:  Lab Results (last 48 hours)       Procedure Component Value Units Date/Time    Urine Culture - Urine, Straight Cath [[id number]] Collected: 03/03/25 5138    Specimen: Urine from Straight Cath Updated: 03/05/25 1049     Urine Culture <25,000 CFU/mL Normal Urogenital Radha    Narrative:       Colonization of the urinary tract without infection is common. Treatment is discouraged unless the patient is symptomatic, pregnant, or undergoing an invasive urologic procedure.    Comprehensive Metabolic Panel [[id number]]  (Abnormal) Collected: 03/05/25 0744    Specimen: Blood Updated: 03/05/25 0915     Glucose 99 mg/dL      BUN 51 mg/dL      Creatinine 1.91 mg/dL      Sodium 132 mmol/L      Potassium 5.1 mmol/L      Comment: Slight hemolysis detected by analyzer. Result may be falsely elevated.        Chloride 97 mmol/L      CO2 26.8 mmol/L      Calcium 9.5 mg/dL      Total Protein 5.8 g/dL      Albumin 3.1 g/dL      ALT (SGPT) <5 U/L      AST (SGOT) 13 U/L      Alkaline Phosphatase 69 U/L      Total Bilirubin 0.4 mg/dL      Globulin 2.7 gm/dL      A/G Ratio 1.1 g/dL      BUN/Creatinine Ratio 26.7     Anion Gap 8.2 mmol/L      eGFR 26.6 mL/min/1.73     Narrative:      GFR Categories in Chronic Kidney Disease (CKD)      GFR Category          GFR (mL/min/1.73)    Interpretation  G1                     90 or greater         Normal or high (1)  G2                      60-89                Mild decrease (1)  G3a                   45-59                Mild to moderate decrease  G3b                   30-44                Moderate to severe decrease  G4                    15-29                Severe decrease  G5                    14 or less           Kidney failure          (1)In the absence of evidence of kidney disease, neither GFR category G1 or G2 fulfill the criteria for CKD.    eGFR calculation 2021 CKD-EPI creatinine equation, which does not include race as a factor    Phosphorus [[id number]]  (Normal) Collected: 03/05/25 0744    Specimen: Blood Updated: 03/05/25 0840     Phosphorus 3.3 mg/dL     Magnesium [[id number]]  (Normal) Collected: 03/05/25 0744    Specimen: Blood Updated: 03/05/25 0839     Magnesium 2.0 mg/dL     CANDIDA AURIS PCR - Swab, Axilla Right, Axilla Left and Groin [[id number]]  (Normal) Collected:  03/04/25 0347    Specimen: Swab from Axilla Right, Axilla Left and Groin Updated: 03/05/25 0815     CANDIDA AURIS PCR Not Detected    CBC & Differential [[id number]]  (Abnormal) Collected: 03/05/25 0744    Specimen: Blood Updated: 03/05/25 0753    Narrative:      The following orders were created for panel order CBC & Differential.  Procedure                               Abnormality         Status                     ---------                               -----------         ------                     CBC Auto Differential[[id number]]        Abnormal            Final result                 Please view results for these tests on the individual orders.    CBC Auto Differential [[id number]]  (Abnormal) Collected: 03/05/25 0744    Specimen: Blood Updated: 03/05/25 0753     WBC 8.11 10*3/mm3      RBC 2.73 10*6/mm3      Hemoglobin 8.1 g/dL      Hematocrit 28.1 %      .9 fL      MCH 29.7 pg      MCHC 28.8 g/dL      RDW 17.7 %      RDW-SD 63.5 fl      MPV 9.6 fL      Platelets 168 10*3/mm3      Neutrophil % 71.1 %      Lymphocyte % 11.6 %      Monocyte % 9.7 %      Eosinophil % 6.8 %      Basophil % 0.2 %      Immature Grans % 0.6 %      Neutrophils, Absolute 5.76 10*3/mm3      Lymphocytes, Absolute 0.94 10*3/mm3      Monocytes, Absolute 0.79 10*3/mm3      Eosinophils, Absolute 0.55 10*3/mm3      Basophils, Absolute 0.02 10*3/mm3      Immature Grans, Absolute 0.05 10*3/mm3      nRBC 0.0 /100 WBC     Tacrolimus Level [[id number]] Collected: 03/05/25 0744    Specimen: Blood Updated: 03/05/25 0748    Blood Culture - Blood, Arm, Left [[id number]]  (Normal) Collected: 03/03/25 1716    Specimen: Blood from Arm, Left Updated: 03/04/25 1731     Blood Culture No growth at 24 hours    Narrative:      Less than seven (7) mL's of blood was collected.  Insufficient quantity may yield false negative results.    Blood Culture - Blood, Arm, Left [[id number]]  (Normal) Collected: 03/03/25 1716    Specimen: Blood from Arm, Left Updated:  "03/04/25 1731     Blood Culture No growth at 24 hours    MRSA Screen, PCR (Inpatient) - Swab, Nares [[id number]]  (Normal) Collected: 03/04/25 0951    Specimen: Swab from Nares Updated: 03/04/25 1111     MRSA PCR No MRSA Detected    Narrative:      The negative predictive value of this diagnostic test is high and should only be used to consider de-escalating anti-MRSA therapy. A positive result may indicate colonization with MRSA and must be correlated clinically.    D-dimer, Quantitative [[id number]]  (Abnormal) Collected: 03/04/25 0549    Specimen: Blood Updated: 03/04/25 0714     D-Dimer, Quantitative >20.00 MCGFEU/mL     Narrative:      According to the assay 's published package insert, a normal (<0.50 MCGFEU/mL) D-dimer result in conjunction with a non-high clinical probability assessment, excludes deep vein thrombosis (DVT) and pulmonary embolism (PE) with high sensitivity.    D-dimer values increase with age and this can make VTE exclusion of an older population difficult. To address this, the [employer], based on best available evidence and recent guidelines, recommends that clinicians use age-adjusted D-dimer thresholds in patients greater than 50 years of age with: a) a low probability of PE who do not meet all Pulmonary Embolism Rule Out Criteria, or b) in those with intermediate probability of PE.   The formula for an age-adjusted D-dimer cut-off is \"age/100\".  For example, a 60 year old patient would have an age-adjusted cut-off of 0.60 MCGFEU/mL and an 80 year old 0.80 MCGFEU/mL.    Comprehensive Metabolic Panel [[id number]]  (Abnormal) Collected: 03/04/25 0549    Specimen: Blood Updated: 03/04/25 0706     Glucose 120 mg/dL      BUN 48 mg/dL      Creatinine 1.84 mg/dL      Sodium 140 mmol/L      Potassium 4.9 mmol/L      Chloride 103 mmol/L      CO2 28.3 mmol/L      Calcium 9.5 mg/dL      Total Protein 6.2 g/dL      Albumin 3.7 g/dL      ALT (SGPT) 5 U/L      AST (SGOT) 18 " U/L      Alkaline Phosphatase 67 U/L      Total Bilirubin 0.4 mg/dL      Globulin 2.5 gm/dL      A/G Ratio 1.5 g/dL      BUN/Creatinine Ratio 26.1     Anion Gap 8.7 mmol/L      eGFR 27.8 mL/min/1.73     Narrative:      GFR Categories in Chronic Kidney Disease (CKD)      GFR Category          GFR (mL/min/1.73)    Interpretation  G1                     90 or greater         Normal or high (1)  G2                      60-89                Mild decrease (1)  G3a                   45-59                Mild to moderate decrease  G3b                   30-44                Moderate to severe decrease  G4                    15-29                Severe decrease  G5                    14 or less           Kidney failure          (1)In the absence of evidence of kidney disease, neither GFR category G1 or G2 fulfill the criteria for CKD.    eGFR calculation 2021 CKD-EPI creatinine equation, which does not include race as a factor    Magnesium [[id number]]  (Normal) Collected: 03/04/25 0549    Specimen: Blood Updated: 03/04/25 0636     Magnesium 2.0 mg/dL     Lipid Panel [[id number]]  (Abnormal) Collected: 03/04/25 0549    Specimen: Blood Updated: 03/04/25 0625     Total Cholesterol 82 mg/dL      Triglycerides 104 mg/dL      HDL Cholesterol 20 mg/dL      LDL Cholesterol  42 mg/dL      VLDL Cholesterol 20 mg/dL      LDL/HDL Ratio 2.06    Narrative:      Cholesterol Reference Ranges  (U.S. Department of Health and Human Services ATP III Classifications)    Desirable          <200 mg/dL  Borderline High    200-239 mg/dL  High Risk          >240 mg/dL      Triglyceride Reference Ranges  (U.S. Department of Health and Human Services ATP III Classifications)    Normal           <150 mg/dL  Borderline High  150-199 mg/dL  High             200-499 mg/dL  Very High        >500 mg/dL    HDL Reference Ranges  (U.S. Department of Health and Human Services ATP III Classifications)    Low     <40 mg/dl (major risk factor for CHD)  High    >60  mg/dl ('negative' risk factor for CHD)        LDL Reference Ranges  (U.S. Department of Health and Human Services ATP III Classifications)    Optimal          <100 mg/dL  Near Optimal     100-129 mg/dL  Borderline High  130-159 mg/dL  High             160-189 mg/dL  Very High        >189 mg/dL    LDL is calculated using the NIH LDL-C calculation.      Blood Gas, Venous - [[id number]]  (Abnormal) Collected: 03/04/25 0538    Specimen: Venous Blood Updated: 03/04/25 0553     Site Left Radial     pH, Venous 7.232 pH Units      pCO2, Venous 78.6 mm Hg      pO2, Venous 21.2 mm Hg      HCO3, Venous 33.1 mmol/L      Base Excess, Venous 3.7 mmol/L      Comment: Serial Number: [id number]:  [id number]        O2 Saturation, Venous 24.7 %      CO2 Content 35.5 mmol/L      Barometric Pressure for Blood Gas --     Comment: N/A        Modality Vapotherm     FIO2 60 %      Notified Who TONYA de la garza    Blood Gas, Venous - [[id number]]  (Abnormal) Collected: 03/04/25 0412    Specimen: Venous Blood Updated: 03/04/25 0424     Site Left Brachial     pH, Venous 7.269 pH Units      pCO2, Venous 73.8 mm Hg      pO2, Venous 27.6 mm Hg      HCO3, Venous 33.8 mmol/L      Base Excess, Venous 5.3 mmol/L      Comment: Serial Number: [id number]:  [id number]        O2 Saturation, Venous 40.7 %      CO2 Content 36.0 mmol/L      Barometric Pressure for Blood Gas --     Comment: N/A        Modality HFNC     FIO2 60 %     Basic Metabolic Panel [[id number]]  (Abnormal) Collected: 03/04/25 0307    Specimen: Blood from Arm, Left Updated: 03/04/25 0340     Glucose 128 mg/dL      BUN 48 mg/dL      Creatinine 1.87 mg/dL      Sodium 143 mmol/L      Potassium 4.8 mmol/L      Chloride 104 mmol/L      CO2 29.9 mmol/L      Calcium 9.2 mg/dL      BUN/Creatinine Ratio 25.7     Anion Gap 9.1 mmol/L      eGFR 27.3 mL/min/1.73     Narrative:      GFR Categories in Chronic Kidney Disease (CKD)      GFR Category          GFR (mL/min/1.73)    Interpretation  G1                      90 or greater         Normal or high (1)  G2                      60-89                Mild decrease (1)  G3a                   45-59                Mild to moderate decrease  G3b                   30-44                Moderate to severe decrease  G4                    15-29                Severe decrease  G5                    14 or less           Kidney failure          (1)In the absence of evidence of kidney disease, neither GFR category G1 or G2 fulfill the criteria for CKD.    eGFR calculation 2021 CKD-EPI creatinine equation, which does not include race as a factor    Magnesium [[id number]]  (Normal) Collected: 03/04/25 0307    Specimen: Blood from Arm, Left Updated: 03/04/25 0340     Magnesium 2.0 mg/dL     Hemoglobin A1c [[id number]]  (Normal) Collected: 03/04/25 0124    Specimen: Blood from Arm, Left Updated: 03/04/25 0241     Hemoglobin A1C 5.06 %     POCT Electrolytes +HGB +HCT [[id number]]  (Abnormal) Collected: 03/04/25 0231    Specimen: Arterial Blood Updated: 03/04/25 0234     Sodium 138 mmol/L      POC Potassium 4.8 mmol/L      Ionized Calcium 1.39 mmol/L      Comment: Serial Number: [id number]:  [id number]        Glucose 141 mg/dL      Hematocrit 29 %      Hemoglobin 10.0 g/dL     POC Lactate [[id number]]  (Normal) Collected: 03/04/25 0231    Specimen: Arterial Blood Updated: 03/04/25 0234     Lactate 1.0 mmol/L      Comment: Serial Number: [id number]:  [id number]       POC Glucose Once [[id number]]  (Abnormal) Collected: 03/04/25 0231    Specimen: Arterial Blood Updated: 03/04/25 0234     Glucose 141 mg/dL      Comment: Serial Number: [id number]:  [id number]       Blood Gas, Arterial - [[id number]]  (Abnormal) Collected: 03/04/25 0231    Specimen: Arterial Blood Updated: 03/04/25 0234     Site Left Radial     Orion's Test Positive     pH, Arterial 7.291 pH units      pCO2, Arterial 69.0 mm Hg      pO2, Arterial 82.0 mm Hg      HCO3, Arterial 33.3 mmol/L      Base Excess, Arterial 5.2 mmol/L       Comment: Serial Number: [id number]:  [id number]        O2 Saturation, Arterial 94.0 %      Barometric Pressure for Blood Gas --     Comment: N/A        Modality Cannula     FIO2 40 %      Hemodilution No     PO2/FIO2 205    POC Creatinine [[id number]]  (Abnormal) Collected: 03/04/25 0231    Specimen: Arterial Blood Updated: 03/04/25 0234     Creatinine 2.07 mg/dL      Comment: Serial Number: [id number]:  [id number]        eGFR 24.1 mL/min/1.73     Cortisol [[id number]] Collected: 03/04/25 0124    Specimen: Blood Updated: 03/04/25 0210     Cortisol 1.83 mcg/dL     Narrative:      Cortisol Reference Ranges:    Cortisol 6AM - 10AM Range: 6.02-18.40 mcg/dl  Cortisol 4PM - 8PM Range: 2.68-10.50 mcg/dl      Results may be falsely increased if patient taking Biotin.      Phosphorus [[id number]]  (Abnormal) Collected: 03/04/25 0124    Specimen: Blood Updated: 03/04/25 0210     Phosphorus 4.6 mg/dL     Ammonia [[id number]]  (Normal) Collected: 03/04/25 0124    Specimen: Blood from Arm, Left Updated: 03/04/25 0152     Ammonia 22 umol/L     CBC & Differential [[id number]]  (Abnormal) Collected: 03/04/25 0124    Specimen: Blood from Arm, Left Updated: 03/04/25 0139    Narrative:      The following orders were created for panel order CBC & Differential.  Procedure                               Abnormality         Status                     ---------                               -----------         ------                     CBC Auto Differential[[id number]]        Abnormal            Final result                 Please view results for these tests on the individual orders.    CBC Auto Differential [[id number]]  (Abnormal) Collected: 03/04/25 0124    Specimen: Blood from Arm, Left Updated: 03/04/25 0139     WBC 8.18 10*3/mm3      RBC 3.05 10*6/mm3      Hemoglobin 9.1 g/dL      Hematocrit 31.5 %      .3 fL      MCH 29.8 pg      MCHC 28.9 g/dL      RDW 17.2 %      RDW-SD 64.2 fl      MPV 9.2 fL      Platelets 204 10*3/mm3       Neutrophil % 69.2 %      Lymphocyte % 15.3 %      Monocyte % 11.1 %      Eosinophil % 3.3 %      Basophil % 0.6 %      Immature Grans % 0.5 %      Neutrophils, Absolute 5.66 10*3/mm3      Lymphocytes, Absolute 1.25 10*3/mm3      Monocytes, Absolute 0.91 10*3/mm3      Eosinophils, Absolute 0.27 10*3/mm3      Basophils, Absolute 0.05 10*3/mm3      Immature Grans, Absolute 0.04 10*3/mm3      nRBC 0.4 /100 WBC     Tacrolimus Level [[id number]] Collected: 03/04/25 0124    Specimen: Blood from Arm, Right Updated: 03/04/25 0132    TSH [[id number]]  (Abnormal) Collected: 03/03/25 1839    Specimen: Blood from Arm, Left Updated: 03/03/25 2050     TSH 33.000 uIU/mL     T4, Free [[id number]]  (Abnormal) Collected: 03/03/25 1839    Specimen: Blood from Arm, Left Updated: 03/03/25 2050     Free T4 0.30 ng/dL     High Sensitivity Troponin T 1Hr [[id number]]  (Abnormal) Collected: 03/03/25 1839    Specimen: Blood from Arm, Left Updated: 03/03/25 1907     HS Troponin T 49 ng/L      Troponin T Numeric Delta -5 ng/L      Troponin T % Delta -9    Narrative:      High Sensitive Troponin T Reference Range:  <14.0 ng/L- Negative Female for AMI  <22.0 ng/L- Negative Male for AMI  >=14 - Abnormal Female indicating possible myocardial injury.  >=22 - Abnormal Male indicating possible myocardial injury.   Clinicians would have to utilize clinical acumen, EKG, Troponin, and serial changes to determine if it is an Acute Myocardial Infarction or myocardial injury due to an underlying chronic condition.         Extra Tubes [[id number]] Collected: 03/03/25 1839    Specimen: Blood from Arm, Left Updated: 03/03/25 1845    Narrative:      The following orders were created for panel order Extra Tubes.  Procedure                               Abnormality         Status                     ---------                               -----------         ------                     Gold Top - [facility][[id number]]                                   Final result                  Please view results for these tests on the individual orders.    Gold Top - SST [[id number]] Collected: 03/03/25 1839    Specimen: Blood from Arm, Left Updated: 03/03/25 1845     Extra Tube Hold for add-ons.     Comment: Auto resulted.       Urinalysis, Microscopic Only - Straight Cath [[id number]]  (Abnormal) Collected: 03/03/25 1733    Specimen: Urine from Straight Cath Updated: 03/03/25 1819     RBC, UA 0-2 /HPF      WBC, UA Too Numerous to Count /HPF      Bacteria, UA 4+ /HPF      Squamous Epithelial Cells, UA 0-2 /HPF      Hyaline Casts, UA 13-20 /LPF      Methodology Manual Light Microscopy    Comprehensive Metabolic Panel [[id number]]  (Abnormal) Collected: 03/03/25 1712    Specimen: Blood Updated: 03/03/25 1741     Glucose 134 mg/dL      BUN 48 mg/dL      Creatinine 2.01 mg/dL      Sodium 135 mmol/L      Potassium 5.3 mmol/L      Chloride 97 mmol/L      CO2 30.8 mmol/L      Calcium 9.6 mg/dL      Total Protein 6.4 g/dL      Albumin 3.1 g/dL      ALT (SGPT) 5 U/L      AST (SGOT) 15 U/L      Alkaline Phosphatase 85 U/L      Total Bilirubin 0.4 mg/dL      Globulin 3.3 gm/dL      A/G Ratio 0.9 g/dL      BUN/Creatinine Ratio 23.9     Anion Gap 7.2 mmol/L      eGFR 25.0 mL/min/1.73     Narrative:      GFR Categories in Chronic Kidney Disease (CKD)      GFR Category          GFR (mL/min/1.73)    Interpretation  G1                     90 or greater         Normal or high (1)  G2                      60-89                Mild decrease (1)  G3a                   45-59                Mild to moderate decrease  G3b                   30-44                Moderate to severe decrease  G4                    15-29                Severe decrease  G5                    14 or less           Kidney failure          (1)In the absence of evidence of kidney disease, neither GFR category G1 or G2 fulfill the criteria for CKD.    eGFR calculation 2021 CKD-EPI creatinine equation, which does not include race as a factor    Urinalysis  With Microscopic If Indicated (No Culture) - Straight Cath [[id number]]  (Abnormal) Collected: 03/03/25 1733    Specimen: Urine from Straight Cath Updated: 03/03/25 1741     Color, UA Dark Yellow     Appearance, UA Cloudy     pH, UA 6.0     Specific Gravity, UA 1.017     Glucose, UA Negative     Ketones, UA Trace     Bilirubin, UA Negative     Blood, UA Moderate (2+)     Protein, UA 30 mg/dL (1+)     Leuk Esterase, UA Large (3+)     Nitrite, UA Negative     Urobilinogen, UA 1.0 E.U./dL    High Sensitivity Troponin T [[id number]]  (Abnormal) Collected: 03/03/25 1712    Specimen: Blood Updated: 03/03/25 1739     HS Troponin T 54 ng/L     Narrative:      High Sensitive Troponin T Reference Range:  <14.0 ng/L- Negative Female for AMI  <22.0 ng/L- Negative Male for AMI  >=14 - Abnormal Female indicating possible myocardial injury.  >=22 - Abnormal Male indicating possible myocardial injury.   Clinicians would have to utilize clinical acumen, EKG, Troponin, and serial changes to determine if it is an Acute Myocardial Infarction or myocardial injury due to an underlying chronic condition.         BNP [[id number]]  (Abnormal) Collected: 03/03/25 1712    Specimen: Blood Updated: 03/03/25 1736     proBNP 17,411.0 pg/mL     Narrative:      This assay is used as an aid in the diagnosis of individuals suspected of having heart failure. It can be used as an aid in the diagnosis of acute decompensated heart failure (ADHF) in patients presenting with signs and symptoms of ADHF to the emergency department (ED). In addition, NT-proBNP of <300 pg/mL indicates ADHF is not likely.    Age Range Result Interpretation  NT-proBNP Concentration (pg/mL:      <50             Positive            >450                   Gray                 300-450                    Negative             <300    50-75           Positive            >900                  Gray                300-900                  Negative            <300      >75              Positive            >1800                  Gray                300-1800                  Negative            <300    CBC & Differential [[id number]]  (Abnormal) Collected: 03/03/25 1712    Specimen: Blood Updated: 03/03/25 1717    Narrative:      The following orders were created for panel order CBC & Differential.  Procedure                               Abnormality         Status                     ---------                               -----------         ------                     CBC Auto Differential[[id number]]        Abnormal            Final result                 Please view results for these tests on the individual orders.    CBC Auto Differential [[id number]]  (Abnormal) Collected: 03/03/25 1712    Specimen: Blood Updated: 03/03/25 1717     WBC 9.73 10*3/mm3      RBC 3.17 10*6/mm3      Hemoglobin 9.4 g/dL      Hematocrit 32.8 %      .5 fL      MCH 29.7 pg      MCHC 28.7 g/dL      RDW 17.1 %      RDW-SD 64.5 fl      MPV 9.0 fL      Platelets 234 10*3/mm3      Neutrophil % 72.7 %      Lymphocyte % 14.3 %      Monocyte % 8.5 %      Eosinophil % 3.5 %      Basophil % 0.5 %      Immature Grans % 0.5 %      Neutrophils, Absolute 7.07 10*3/mm3      Lymphocytes, Absolute 1.39 10*3/mm3      Monocytes, Absolute 0.83 10*3/mm3      Eosinophils, Absolute 0.34 10*3/mm3      Basophils, Absolute 0.05 10*3/mm3      Immature Grans, Absolute 0.05 10*3/mm3      nRBC 0.2 /100 WBC     Martin Draw [[id number]] Collected: 03/03/25 1712    Specimen: Blood Updated: 03/03/25 1715    Narrative:      The following orders were created for panel order Martin Draw.  Procedure                               Abnormality         Status                     ---------                               -----------         ------                     Green Top (Gel)[[id number]]                                  Final result               Lavender Top[[id number]]                                     Final result               Gold Top -  SST[[id number]]                                   Final result               Light Blue Top[[id number]]                                   Final result                 Please view results for these tests on the individual orders.    Green Top (Gel) [[id number]] Collected: 03/03/25 1712    Specimen: Blood Updated: 03/03/25 1715     Extra Tube Hold for add-ons.     Comment: Auto resulted.       Lavender Top [[id number]] Collected: 03/03/25 1712    Specimen: Blood Updated: 03/03/25 1715     Extra Tube hold for add-on     Comment: Auto resulted       Gold Top - SST [[id number]] Collected: 03/03/25 1712    Specimen: Blood Updated: 03/03/25 1715     Extra Tube Hold for add-ons.     Comment: Auto resulted.       Light Blue Top [[id number]] Collected: 03/03/25 1712    Specimen: Blood Updated: 03/03/25 1715     Extra Tube Hold for add-ons.     Comment: Auto resulted       POC Lactate [[id number]]  (Normal) Collected: 03/03/25 1709    Specimen: Blood Updated: 03/03/25 1711     Lactate 0.6 mmol/L      Comment: Serial Number: [id number]:  [id number]       POC Glucose Once [[id number]]  (Abnormal) Collected: 03/03/25 1638    Specimen: Blood Updated: 03/03/25 1640     Glucose 124 mg/dL      Comment: Serial Number: [id number]:  [id number]             Imaging Results (Last 72 Hours)       Procedure Component Value Units Date/Time    NM Lung Ventilation Perfusion Aerosol [[id number]] Collected: 03/04/25 1244     Updated: 03/04/25 1250    Narrative:      NM LUNG VENTILATION PERFUSION AEROSOL    Date of Exam: 3/4/2025 12:20 PM EST    Indication: Shortness of breath, R/O PE.    Comparison: Chest radiograph 3/3/2025    Technique:  The patient was ventilated with 42 mCi of technetium 99m pyrophosphate aerosol and ventilation images were acquired in multiple obliquities. The patient then received 5.5 mCi of technetium 99m MAA intravenously and perfusion images were   acquired in multiple obliquities.      Findings:  Significant  bilateral ventilation defects likely in keeping with known pulmonary edema, pleural effusions and/or underlying airspace disease. There are multiple small matched peripheral defects, without large peripheral wedge-shaped mismatch defect to   suggest pulmonary embolism.      Impression:      Impression:  No convincing pulmonary embolism.    Significantly abnormal ventilation likely reflecting patient's known pleural effusions, edema and/or underlying airspace disease.        Electronically Signed: Jaden Cummings MD    3/4/2025 12:48 PM EST    Workstation ID: [id number]    XR Chest 1 View [[id number]] Collected: 03/03/25 1752     Updated: 03/03/25 1800    Narrative:      XR CHEST 1 VW    Date of Exam: 3/3/2025 5:28 PM EST    Indication: sob    Comparison: Single view chest radiographs dated 3/1/2025 and 12/1/2024    Findings:  there are layering bilateral pleural effusions which obscure both diaphragms. Pulmonary vascularity is prominent. Prominent cardiac silhouette and senescent change in the thoracic aorta are present. Cardiac hilar mediastinal silhouettes are unchanged. No   pneumothorax. Trachea is midline.         Impression:      Impression:  Cardiomegaly with layering bilateral pleural effusions prominent pulmonary vascularity and suspected pulmonary edema. No significant radiographic change from 2 days ago.        Electronically Signed: Cristopher August DO    3/3/2025 5:58 PM EST    Workstation ID: [id number]            Review of Systems:  Review of Systems   Constitutional: Negative.    HENT: Negative.     Eyes: Negative.    Respiratory: Negative.     Cardiovascular:  Positive for leg swelling.   Gastrointestinal: Negative.    Endocrine: Negative.    Genitourinary: Negative.    Musculoskeletal:  Positive for gait problem.   Skin:  Positive for wound.   Hematological: Negative.    Psychiatric/Behavioral: Negative.          Forgetful       Physical Assessment:  Wound 03/05/25 0914 Left scalp (Active)                                                                          Lymphedema wraps removed.  The wraps were saved and placed into a bag and given to the patient's caregiver.  The right heel has an area that is small but is unstageable.  The areas covered and dry hard crusting.  No exudates noted no warmth erythema induration noted to the area.    Right lateral foot has a healing stage III pressure injury.  The wound is full-thickness.  The wound is bright pink and moist.  Small to moderate amount of serosanguineous exudate is noted.  No overt symptoms of infection are noted.  Approximate size is 1.4 x 1 cm with a depth of 0.2 cm.    There is an old resolved area to the right knee.  It is open to air at this time.  I am able to palpate a pedal pulse.  Patient skin is dry but edema appears to be well-controlled in the lower extremity.    There is a healing stage III pressure injury to the left heel.  The wound is quite superficial at this time it is healing and and granulating in nicely.  It is small moderate amount of serosanguineous exudate is noted.  The area is small.  It is 0.8 x 0.8 cm.  No overt symptoms of infection are noted.  Additionally along the lateral side of the foot.  There are some stage I pressure injuries and areas of nonblanchable erythema.  The skin is dry.  The pedal pulses palpable    Bilateral lower extremities were cleansed.  Lotion was applied calcium alginate dressings were applied to the open areas and then an Allevyn heel dressing was applied.  Her legs were both wrapped with Curlex and I used two 4 inch Ace wrap's wrapping from the base of her toes to just below her knees.  I then placed the patient in 2 soft Medline offloading boots as patient's heels were pressing in the bed.    Final diagnosis  Stage III right foot pressure injury present on admission  Healing stage III left heel present on admission      Recommendation and Plan  Patient has photos from the origination of the wounds.  The  wounds have healed and done quite well.  Patient has been seeing lymphedema daily for wrappings and have done quite nicely with controlling her edema.  We are not able to provide lymphedema wraps while she is here we will provide care to her wounds.  Using alginate dressings and Allevyn foam dressings and offloading.  Can use mild compression with Ace wrap's while here.    Kinga Chopra, TONYA   3/5/2025   16:08 EST

## 2025-03-05 NOTE — PLAN OF CARE
Goal Outcome Evaluation:    Patient remains on 10LHF. No gtts. Alert to self, confused but follows commands. External cath collected 200cc. No BM on shift. VQ scan negative, RUE doppler negative. Wound, Nephro, Vascular consulted.     PCU downgrade. Family updated.

## 2025-03-05 NOTE — PROGRESS NOTES
"RENAL/KCC:     LOS: 2 days    Patient Care Team:  Kvng Guillen MD as PCP - General (Family Medicine)  Jak Gavin MD as Cardiologist (Cardiology)    Chief Complaint:  TONYA/CKD, Kidney Transplant    Subjective     Interval History:   Chart reviewed    Objective     Vital Sign Min/Max for last 24 hours  Temp  Min: 98 °F (36.7 °C)  Max: 98.5 °F (36.9 °C)   BP  Min: 92/40  Max: 119/45   Pulse  Min: 94  Max: 137   Resp  Min: 18  Max: 22   SpO2  Min: 88 %  Max: 100 %   Flow (L/min) (Oxygen Therapy)  Min: 4  Max: 10   Weight  Min: 83 kg (182 lb 15.7 oz)  Max: 83 kg (182 lb 15.7 oz)     Flowsheet Rows      Flowsheet Row First Filed Value   Admission Height 167.7 cm (66.02\") Documented at 03/03/2025 1641   Admission Weight 81.6 kg (179 lb 14.3 oz) Documented at 03/03/2025 1641            No intake/output data recorded.  I/O last 3 completed shifts:  In: 600 [P.O.:600]  Out: 600 [Urine:600]    Physical Exam:  GEN: Awake, NAD  ENT: PERRL, EOMI, MMM  NECK: Supple, no JVD  CHEST: CTAB, no W/R/C  CV: RRR, no M/G/R  ABD: Soft, NT, +BS  SKIN: Warm and Dry  NEURO: CN's intact      WBC WBC   Date Value Ref Range Status   03/05/2025 8.11 3.40 - 10.80 10*3/mm3 Final   03/04/2025 8.18 3.40 - 10.80 10*3/mm3 Final   03/03/2025 9.73 3.40 - 10.80 10*3/mm3 Final      HGB Hemoglobin   Date Value Ref Range Status   03/05/2025 8.1 (L) 12.0 - 15.9 g/dL Final   03/04/2025 10.0 (L) 12.0 - 17.0 g/dL Final   03/04/2025 9.1 (L) 12.0 - 15.9 g/dL Final   03/03/2025 9.4 (L) 12.0 - 15.9 g/dL Final      HCT Hematocrit   Date Value Ref Range Status   03/05/2025 28.1 (L) 34.0 - 46.6 % Final   03/04/2025 29 (L) 38 - 51 % Final   03/04/2025 31.5 (L) 34.0 - 46.6 % Final   03/03/2025 32.8 (L) 34.0 - 46.6 % Final      Platlets No results found for: \"LABPLAT\"   MCV MCV   Date Value Ref Range Status   03/05/2025 102.9 (H) 79.0 - 97.0 fL Final   03/04/2025 103.3 (H) 79.0 - 97.0 fL Final   03/03/2025 103.5 (H) 79.0 - 97.0 fL Final          Sodium Sodium   Date " "Value Ref Range Status   03/04/2025 140 136 - 145 mmol/L Final   03/04/2025 143 136 - 145 mmol/L Final   03/03/2025 135 (L) 136 - 145 mmol/L Final      Potassium Potassium   Date Value Ref Range Status   03/04/2025 4.9 3.5 - 5.2 mmol/L Final   03/04/2025 4.8 3.5 - 5.2 mmol/L Final   03/03/2025 5.3 (H) 3.5 - 5.2 mmol/L Final      Chloride Chloride   Date Value Ref Range Status   03/04/2025 103 98 - 107 mmol/L Final   03/04/2025 104 98 - 107 mmol/L Final   03/03/2025 97 (L) 98 - 107 mmol/L Final      CO2 CO2   Date Value Ref Range Status   03/04/2025 28.3 22.0 - 29.0 mmol/L Final   03/04/2025 29.9 (H) 22.0 - 29.0 mmol/L Final   03/03/2025 30.8 (H) 22.0 - 29.0 mmol/L Final      BUN BUN   Date Value Ref Range Status   03/04/2025 48 (H) 8 - 23 mg/dL Final   03/04/2025 48 (H) 8 - 23 mg/dL Final   03/03/2025 48 (H) 8 - 23 mg/dL Final      Creatinine Creatinine   Date Value Ref Range Status   03/04/2025 1.84 (H) 0.57 - 1.00 mg/dL Final   03/04/2025 1.87 (H) 0.57 - 1.00 mg/dL Final   03/04/2025 2.07 (H) 0.60 - 1.30 mg/dL Final     Comment:     Serial Number: [id number]:  [id number]   03/03/2025 2.01 (H) 0.57 - 1.00 mg/dL Final      Calcium Calcium   Date Value Ref Range Status   03/04/2025 9.5 8.6 - 10.5 mg/dL Final   03/04/2025 9.2 8.6 - 10.5 mg/dL Final   03/03/2025 9.6 8.6 - 10.5 mg/dL Final      PO4 No results found for: \"CAPO4\"   Albumin Albumin   Date Value Ref Range Status   03/04/2025 3.7 3.5 - 5.2 g/dL Final   03/03/2025 3.1 (L) 3.5 - 5.2 g/dL Final      Magnesium Magnesium   Date Value Ref Range Status   03/05/2025 2.0 1.6 - 2.4 mg/dL Final   03/04/2025 2.0 1.6 - 2.4 mg/dL Final   03/04/2025 2.0 1.6 - 2.4 mg/dL Final      Uric Acid No results found for: \"URICACID\"        Results Review:     I reviewed the patient's new clinical results.    apixaban, 5 mg, Oral, Q12H  cefTRIAXone, 1,000 mg, Intravenous, Q24H  DULoxetine, 40 mg, Oral, Daily  furosemide, 60 mg, Intravenous, Once  HYDROcodone-acetaminophen, 1 tablet, " Oral, Q6H  levothyroxine, 137 mcg, Oral, Q AM  metoprolol tartrate, 12.5 mg, Oral, Q12H  midodrine, 15 mg, Oral, Q8H  mirtazapine, 15 mg, Oral, Nightly  mupirocin, 1 Application, Each Nare, BID  pantoprazole, 40 mg, Oral, Q AM  polyethylene glycol, 17 g, Oral, Daily  [Held by provider] sacubitril-valsartan, 1 tablet, Oral, Q12H  sennosides-docusate, 1 tablet, Oral, BID  sodium chloride, 10 mL, Intravenous, Q12H  sodium chloride, 10 mL, Intravenous, Q12H  tacrolimus, 0.5 mg, Oral, BID  vitamin B-12, 1,000 mcg, Oral, Daily      Medication Review: Reviewed    Assessment & Plan     TONYA  CKD3  Kidney Transplant  UTI  Fluid overload  Hypotension  Viral URI  RUE swelling - AVF with thrill and bruit  Anemia     PLAN: Awaiting labs this AM.  Cr was narrowly better yesterday at 1.8 from 2 but still above recent baseline of 1.2.  Continue Prograf and follow-up level.  Continue Midodrine for BP support.  Hold on any additional volume.  Will consider resuming diuretics today if renal function stable.  Vascular consulted to evaluate RUE swelling - may need fistulogram.  ABX per primary.  Patient is agreeable to acute HD again if necessary.  Will follow closely.        Naun Falcon MD  [facility]  03/05/25  08:41 EST

## 2025-03-05 NOTE — CONSULTS
Name: Jaja Carcamo ADMIT: 3/3/2025   : 1945  PCP: Kvng Guillen MD    MRN: [id number] LOS: 2 days   AGE/SEX: 80 y.o. female  ROOM: 2307/1   [facility]      Patient Care Team:  Kvng Guillen MD as PCP - General (Family Medicine)  Jak Gavin MD as Cardiologist (Cardiology)  Chief Complaint   Patient presents with    Altered Mental Status       CC: Right arm swelling    Subjective     Inpatient Vascular Surgery Consult  Consult performed by: Lisa Wolf APRN  Consult ordered by: Naun Falcon MD          Altered Mental Status    Jaja Carcamo is a 80 y.o. female with a past medical history of COPD, chronic hypoxemic respiratory failure, COVID-19, ESRD on HD, kidney transplant, hyperlipidemia, hypothyroidism, non-small cell lung cancer, CAD, A-fib, DVT, skin cancer, and tobacco abuse who presented to [facility] on 3/3/2025 for altered mental status and lethargy.  In the ER she was found to be in fluid overload and have a UTI.  She was admitted to the ICU for further workup and care.  She was started on antibiotics.  Nephrology was consulted for possible need of dialysis.  She has baseline chronic lower extremity swelling and swelling to her right upper extremity.  She has a history of a right upper extremity DVT and is chronically anticoagulated with Eliquis for her atrial fibrillation.  Venous duplex on 3/3 shows a normal right upper extremity venous duplex scan, negative for DVT or SVT.  Incidentally noted was a right patent brachiocephalic fistula with arteriomegaly of the brachial and axillary artery.  Vascular surgery was consulted for her right arm swelling.  The patient underwent a right arm AV fistula creation with Dr. Arango was 10 years ago.  She was seen by Dr. Alva in December for right IJ DVT with right arm swelling, she underwent a right arm fistulogram for central venous stenosis.  She has not had much improvement in swelling to her right arm though it  is soft.  She is able to move her fingers and hand.  No complaints of pain in her arm.    Review of Systems   Musculoskeletal:  Positive for joint swelling.   Skin:  Negative for wound.       Past Medical History:   Diagnosis Date    Allergic rhinitis 04/14/2015    Asthma 08/10/2017    Atrial flutter 05/11/2017    Chronic diarrhea 04/15/2019    Chronic hypoxemic respiratory failure 01/18/2024    Chronic pain 02/03/2015    COPD (chronic obstructive pulmonary disease)     COVID-19 virus detected 08/11/2022    Cytokine release syndrome, grade 1 08/16/2022    Edema of both lower extremities due to peripheral venous insufficiency 03/12/2021    ESRD on hemodialysis 05/22/2013    Essential (primary) hypertension 03/03/2022    Fracture of ulnar styloid 05/19/2022    Fracture of unspecified carpal bone, right wrist, subsequent encounter for fracture with routine healing 03/03/2022    Gastroesophageal reflux disease 11/12/2020    Gout 08/10/2017    Hearing loss 08/10/2017    History of appendectomy     History of DVT (deep vein thrombosis) 11/12/2020    History of kidney transplant 06/30/2017    History of lobectomy of lung 01/18/2024 03/08/2016:  RIGHT Lower Lobe Mass--> Right Video-assisted thoracoscopy with a moderate-to-large wedge resection of the RLL (by Dr. Haris Mcconnell @ [facility])--> Poorly differentiated carcinoma of the RLL.      History of repair of hip joint 05/21/2013    History of suicide attempt 05/20/2024    Hyperlipidemia 08/11/2014    Hypothyroidism, unspecified 03/03/2022    Infection due to extended spectrum beta lactamase (ESBL) producing bacteria 03/11/2016    No A2K system hx. +ESBL E coli urine on 3/11/16.      Irritable bowel syndrome 04/15/2019    Long term (current) use of immunosuppressive biologic 04/28/2021    Long term current use of anticoagulant therapy 01/18/2024    Malignant neoplasm of lung 08/10/2017    Menopausal flushing 08/30/2021    Mitral valve regurgitation 07/06/2015     Mixed anxiety and depressive disorder 04/30/2015    Non-small cell lung cancer 08/10/2017    Nonobstructive atherosclerosis of coronary artery 06/02/2019 08/12/2022: CATH: [facility]: NSTEMI assoc with Covid-19: LM:-nl;  LAD: diffuse, Ca++; 30%; CIRC: Dominant. Normal; RCA: small; 50% diffuse, proximal and mid-segment.      NSTEMI (non-ST elevated myocardial infarction) 08/11/2022    Obsessive-compulsive disorder 04/15/2019    Osteoarthritis 05/20/2024    Paroxysmal atrial fibrillation 05/11/2017    Paroxysmal supraventricular tachycardia 05/11/2017    Peripheral neuropathy 08/11/2014    Personal history of peptic ulcer disease 11/12/2020    Postoperative anemia due to acute blood loss 05/22/2013    Right wrist fracture 03/01/2022    Seasonal allergies 08/10/2017    Secondary hyperparathyroidism of renal origin 09/14/2015    Tricuspid valve regurgitation 03/15/2017    Ulcer of lower extremity 08/30/2021    Unspecified acute appendicitis 03/03/2022    Valvular heart disease 05/20/2024    Wegener's granulomatosis with renal involvement 03/03/2022     Past Surgical History:   Procedure Laterality Date    APPENDECTOMY      ARTERIOVENOUS FISTULA/SHUNT SURGERY Right 12/3/2024    Procedure: FISTULOGRAM  and angioplasty RIGHT ARM;  Surgeon: Paulino Alva II, MD;  Location: [facility] HYBRID OR;  Service: Vascular;  Laterality: Right;    BREAST SURGERY Left     cysts rmeoved    CARDIAC CATHETERIZATION Right 08/12/2022    Procedure: Left Heart Cath and coronary angiogram;  Surgeon: Jak Gavin MD;  Location: [facility] CATH INVASIVE LOCATION;  Service: Cardiology;  Laterality: Right;    CLOSED REDUCTION WRIST FRACTURE Right 03/01/2022    Procedure: WRIST CLOSED REDUCTION;  Surgeon: Gaudencio Townsend MD;  Location: [facility] MAIN OR;  Service: Orthopedics;  Laterality: Right;    CYSTOSCOPY      HIP ARTHROPLASTY      HYSTERECTOMY      LUNG LOBECTOMY Right     TRANSPLANTATION RENAL       Family History   Problem Relation Age of Onset     No Known Problems Mother     No Known Problems Father        Social History     Tobacco Use    Smoking status: Former     Passive exposure: Current    Smokeless tobacco: Never   Vaping Use    Vaping status: Former   Substance Use Topics    Alcohol use: Never    Drug use: Never     Medications Prior to Admission   Medication Sig Dispense Refill Last Dose/Taking    amoxicillin-clavulanate (AUGMENTIN) 875-125 MG per tablet Take 1 tablet by mouth 2 (Two) Times a Day. 14 tablet 0 Taking    apixaban (ELIQUIS) 5 MG tablet tablet Take 1 tablet by mouth Every 12 (Twelve) Hours. 60 tablet 0 Taking    DULoxetine HCl 40 MG capsule delayed-release particles Take 1 capsule by mouth Daily. Indications: Major Depressive Disorder   Taking    ferrous sulfate 325 (65 FE) MG tablet Take 1 tablet by mouth 3 times a day.   Taking    furosemide (LASIX) 40 MG tablet Take 1 tablet by mouth 2 (Two) Times a Day.   Taking    hydrOXYzine (ATARAX) 25 MG tablet Take 1 tablet by mouth Every Night.   Taking    LORazepam (ATIVAN) 0.5 MG tablet Take 1 tablet by mouth Every 6 (Six) Hours.   Taking    metoprolol tartrate (LOPRESSOR) 25 MG tablet Take 0.5 tablets by mouth 2 (Two) Times a Day.   Taking    midodrine (PROAMATINE) 5 MG tablet Take 3 tablets by mouth 3 (Three) Times a Day Before Meals.   Taking    mirtazapine (REMERON) 15 MG tablet Take 1 tablet by mouth Every Night.   Taking    polyethylene glycol (MiraLax) 17 GM/SCOOP powder Take 17 g by mouth Daily.   Taking    predniSONE (DELTASONE) 5 MG tablet Take 1 tablet by mouth Daily. Indications: ACUTE EXACERBATION OF COPD (INACTIVE)   Taking    sacubitril-valsartan (Entresto) 24-26 MG tablet Take 1 tablet by mouth 2 (Two) Times a Day.   Taking    sennosides-docusate (PERICOLACE) 8.6-50 MG per tablet Take 1 tablet by mouth 2 (Two) Times a Day.   Taking    simvastatin (ZOCOR) 20 MG tablet Take 1 tablet by mouth Every Night.   Taking    tacrolimus (PROGRAF) 0.5 MG capsule Take 1 capsule by mouth  2 (Two) Times a Day. 60 capsule 1 Taking    vitamin B-12 (CYANOCOBALAMIN) 1000 MCG tablet Take 1 tablet by mouth Daily.   Taking    vitamin D (ERGOCALCIFEROL) 1.25 MG (12960 UT) capsule capsule Take 1 capsule by mouth 1 (One) Time Per Week.   Taking    acetaminophen (Tylenol) 325 MG tablet Take 2 tablets by mouth Every 4 (Four) Hours As Needed for Fever or Mild Pain. Indications: Pain       albuterol sulfate  (90 Base) MCG/ACT inhaler Inhale 2 puffs Every 6 (Six) Hours As Needed for Wheezing.       HYDROcodone-acetaminophen (NORCO)  MG per tablet Take 1 tablet by mouth Every 6 (Six) Hours.        apixaban, 5 mg, Oral, Q12H  cefTRIAXone, 1,000 mg, Intravenous, Q24H  DULoxetine, 40 mg, Oral, Daily  furosemide, 60 mg, Intravenous, Once  HYDROcodone-acetaminophen, 1 tablet, Oral, Q6H  levothyroxine, 137 mcg, Oral, Q AM  metoprolol tartrate, 12.5 mg, Oral, Q12H  midodrine, 15 mg, Oral, Q8H  mirtazapine, 15 mg, Oral, Nightly  mupirocin, 1 Application, Each Nare, BID  pantoprazole, 40 mg, Oral, Q AM  polyethylene glycol, 17 g, Oral, Daily  [Held by provider] sacubitril-valsartan, 1 tablet, Oral, Q12H  sennosides-docusate, 1 tablet, Oral, BID  sodium chloride, 10 mL, Intravenous, Q12H  sodium chloride, 10 mL, Intravenous, Q12H  tacrolimus, 0.5 mg, Oral, BID  vitamin B-12, 1,000 mcg, Oral, Daily           acetaminophen **OR** acetaminophen    aluminum-magnesium hydroxide-simethicone    senna-docusate sodium **AND** polyethylene glycol **AND** bisacodyl **AND** bisacodyl    hydrOXYzine    [Held by provider] LORazepam    nitroglycerin    ondansetron ODT **OR** ondansetron    prochlorperazine    [COMPLETED] Insert Peripheral IV **AND** sodium chloride    sodium chloride    sodium chloride    sodium chloride    sodium chloride  Zolpidem    Objective     Physical Exam:   NAD  RRR  Lungs clear  Abd soft, benign  Vascular: Palpable radial pulses bilaterally, thrill and bruit to the right arm AV fistula  Soft swelling  to the right upper extremity    Vital Signs and Labs:  Vital Signs Patient Vitals for the past 24 hrs:   BP Temp Temp src Pulse Resp SpO2 Weight   03/05/25 0659 -- -- -- 94 -- 98 % --   03/05/25 0600 102/52 -- -- 109 -- 96 % --   03/05/25 0500 -- -- -- 103 -- 96 % --   03/05/25 0400 98/48 98.1 °F (36.7 °C) Oral 107 -- 98 % 83 kg (182 lb 15.7 oz)   03/05/25 0300 -- -- -- 115 -- 100 % --   03/05/25 0235 -- -- -- 114 19 100 % --   03/05/25 0200 -- -- -- 98 -- 100 % --   03/05/25 0100 -- -- -- 107 -- 100 % --   03/05/25 0000 99/56 98.5 °F (36.9 °C) Oral 102 -- 100 % --   03/04/25 2338 -- -- -- 111 22 99 % --   03/04/25 2300 116/54 -- -- 110 -- 100 % --   03/04/25 2200 102/53 -- -- 104 -- 100 % --   03/04/25 2100 -- -- -- (!) 121 -- 100 % --   03/04/25 2000 119/45 98.3 °F (36.8 °C) Oral 116 -- 100 % --   03/04/25 1930 -- -- -- 113 -- 99 % --   03/04/25 1915 -- -- -- 117 -- 90 % --   03/04/25 1900 92/40 -- -- (!) 124 -- 100 % --   03/04/25 1845 -- -- -- 120 -- 99 % --   03/04/25 1832 -- -- -- 100 19 95 % --   03/04/25 1830 -- -- -- 105 -- 100 % --   03/04/25 1815 -- -- -- 110 -- 100 % --   03/04/25 1800 -- -- -- 116 -- (!) 88 % --   03/04/25 1745 -- -- -- 111 -- 100 % --   03/04/25 1730 -- -- -- 108 -- 96 % --   03/04/25 1715 -- -- -- 97 -- 99 % --   03/04/25 1700 106/52 -- -- 103 -- 100 % --   03/04/25 1645 -- -- -- 108 -- 93 % --   03/04/25 1630 -- -- -- 115 -- 100 % --   03/04/25 1615 -- -- -- 108 -- 95 % --   03/04/25 1600 -- 98 °F (36.7 °C) Oral 112 -- 99 % --   03/04/25 1545 -- -- -- 119 -- 100 % --   03/04/25 1530 -- -- -- 105 -- 100 % --   03/04/25 1515 -- -- -- 99 -- 100 % --   03/04/25 1500 117/63 -- -- 110 -- 100 % --   03/04/25 1445 -- -- -- (!) 121 -- 100 % --   03/04/25 1430 -- -- -- 104 -- 100 % --   03/04/25 1415 -- -- -- 114 -- 100 % --   03/04/25 1401 102/55 -- -- 107 -- -- --   03/04/25 1400 102/55 -- -- 111 -- 100 % --   03/04/25 1345 -- -- -- 96 -- 100 % --   03/04/25 1330 -- -- -- 109 -- 100 % --    03/04/25 1315 -- -- -- 112 -- 99 % --   03/04/25 1300 -- -- -- 111 -- 100 % --   03/04/25 1245 -- -- -- 113 -- 100 % --   03/04/25 1230 -- -- -- 108 -- 100 % --   03/04/25 1215 -- -- -- (!) 137 -- 100 % --   03/04/25 1200 99/44 -- -- 100 -- 95 % --   03/04/25 1103 -- -- -- 105 18 100 % --     BMI:  Body mass index is 29.53 kg/m².    CBC    Results from last 7 days   Lab Units 03/05/25  0744 03/04/25  0231 03/04/25  0124 03/03/25 1712 03/01/25  1002   WBC 10*3/mm3 8.11  --  8.18 9.73 9.95   HEMOGLOBIN g/dL 8.1*  --  9.1* 9.4* 9.1*   HEMOGLOBIN, POC g/dL  --  10.0*  --   --   --    PLATELETS 10*3/mm3 168  --  204 234 185     BMP   Results from last 7 days   Lab Units 03/05/25  0744 03/04/25  0549 03/04/25  0307 03/04/25  0231 03/04/25  0124 03/03/25 1712 03/01/25  1002   SODIUM mmol/L  --  140 143  --   --  135* 137   POTASSIUM mmol/L  --  4.9 4.8  --   --  5.3* 5.0   CHLORIDE mmol/L  --  103 104  --   --  97* 99   CO2 mmol/L  --  28.3 29.9*  --   --  30.8* 30.8*   BUN mg/dL  --  48* 48*  --   --  48* 37*   CREATININE mg/dL  --  1.84* 1.87* 2.07*  --  2.01* 1.29*   GLUCOSE mg/dL  --  120* 128*  --   --  134* 114*   MAGNESIUM mg/dL 2.0 2.0 2.0  --   --   --   --    PHOSPHORUS mg/dL 3.3  --   --   --  4.6*  --   --      Coag     HbA1C   Lab Results   Component Value Date    HGBA1C 5.06 03/04/2025     Infection   Results from last 7 days   Lab Units 03/03/25  1733 03/03/25  1716   BLOODCX   --  No growth at 24 hours  No growth at 24 hours   URINECX  Culture in progress  --      Radiology(recent) NM Lung Ventilation Perfusion Aerosol    Result Date: 3/4/2025  Impression: No convincing pulmonary embolism. Significantly abnormal ventilation likely reflecting patient's known pleural effusions, edema and/or underlying airspace disease. Electronically Signed: Jaden Cummings MD  3/4/2025 12:48 PM EST  Workstation ID: [id number]    XR Chest 1 View    Result Date: 3/3/2025  Impression: Cardiomegaly with layering bilateral  pleural effusions prominent pulmonary vascularity and suspected pulmonary edema. No significant radiographic change from 2 days ago. Electronically Signed: Cristopher August DO  3/3/2025 5:58 PM EST  Workstation ID: [id number]     VTE Prophylaxis:  Pharmacologic & mechanical VTE prophylaxis orders are present.        Active Hospital Problems    Diagnosis  POA    **UTI (urinary tract infection) [N39.0]  Yes    TONYA (acute kidney injury) [N17.9]  Unknown    Edema of right upper arm [R60.0]  Yes    End stage renal disease [N18.6]  Yes    Acute exacerbation of CHF (congestive heart failure) [I50.9]  Yes    Long term current use of anticoagulant therapy [Z79.01]  Not Applicable    COPD (chronic obstructive pulmonary disease) [J44.9]  Yes    Hypothyroidism, unspecified [E03.9]  Yes    Essential (primary) hypertension [I10]  Yes    Long term (current) use of immunosuppressive biologic [Z79.620]  Not Applicable    Edema of both lower extremities due to peripheral venous insufficiency [I87.2]  Yes    History of DVT (deep vein thrombosis) [Z86.718]  Not Applicable    History of kidney transplant [Z94.0]  Not Applicable    Paroxysmal atrial fibrillation [I48.0]  Yes    Pulmonary hypertension [I27.20]  Yes    Mixed anxiety and depressive disorder [F41.8]  Yes      Resolved Hospital Problems   No resolved problems to display.       Assessment & Plan   Assessment / Plan     UTI (urinary tract infection)    COPD (chronic obstructive pulmonary disease)    Hypothyroidism, unspecified    History of kidney transplant    Long term (current) use of immunosuppressive biologic    Long term current use of anticoagulant therapy    History of DVT (deep vein thrombosis)    Pulmonary hypertension    Edema of both lower extremities due to peripheral venous insufficiency    Mixed anxiety and depressive disorder    Essential (primary) hypertension    Paroxysmal atrial fibrillation    Acute exacerbation of CHF (congestive heart failure)    End stage  renal disease    Edema of right upper arm    TONYA (acute kidney injury)      80 y.o. female with history of renal transplant and ESRD who presents with lethargy  Found to be in fluid overload, nephrology consulted for possible dialysis needs  Chronic lower extremity swelling, recommend continuing Ace wraps and elevation  Chronic right arm swelling, patient diagnosed with right IJ DVT in December, chronically anticoagulated on Eliquis for history of atrial fibrillation, her arm swelling is soft and no signs of skin threat, it remains neuromotor intact and she denies pain  Venous duplex on 3/3 is negative for DVT/SVT  Her fistula is patent with good thrill and bruit present  Recommend elevating the right arm  Okay to use fistula if dialysis is needed  No indication for an acute vascular surgery intervention  We will plan to see her as an outpatient  We will sign off    Thank you for this consultation.        TONYA Gerber  [facility] [place] [facility]  03/05/25   O: [phone]  F: [phone]

## 2025-03-05 NOTE — PROGRESS NOTES
LOS: 2 days   Patient Care Team:  Kvng Guillen MD as PCP - General (Family Medicine)  Jak Gavin MD as Cardiologist (Cardiology)    Subjective     Interval History:     Patient Complaints: feeling some better.  Mentation at baseline. BP still soft    History taken from: patient    Review of Systems   Constitutional:  Positive for activity change, appetite change and fatigue. Negative for fever.   Respiratory: Negative.     Cardiovascular:  Positive for leg swelling. Negative for chest pain and palpitations.   Gastrointestinal: Negative.    Musculoskeletal:  Positive for arthralgias, back pain and gait problem.   Neurological:  Positive for weakness.           Objective     Vital Signs  Temp:  [97.9 °F (36.6 °C)-98.5 °F (36.9 °C)] 97.9 °F (36.6 °C)  Heart Rate:  [] 94  Resp:  [18-22] 19  BP: ()/(40-63) 102/52    Physical Exam:     General Appearance:    Alert, cooperative, in no acute distress   Head:    Normocephalic, without obvious abnormality, atraumatic   Eyes:            Lids and lashes normal, conjunctivae and sclerae normal, no   icterus, no pallor, corneas clear, PERRLA   Ears:    Ears appear intact with no abnormalities noted   Throat:   No oral lesions, no thrush, oral mucosa moist   Neck:   No adenopathy, supple, trachea midline, no thyromegaly, no   carotid bruit, no JVD   Lungs:     Clear to auscultation,respirations regular, even and                  unlabored    Heart:    Regular rhythm and normal rate, normal S1 and S2, no            murmur, no gallop, no rub, no click   Chest Wall:    No abnormalities observed   Abdomen:     Normal bowel sounds, no masses, no organomegaly, soft        non-tender, non-distended, no guarding, no rebound                tenderness   Extremities:   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    Pulses:   Pulses palpable and equal bilaterally   Skin:   No bleeding, bruising or rash   Lymph nodes:   No palpable adenopathy   Neurologic:    Cranial nerves 2 - 12 grossly intact, sensation intact, DTR       present and equal bilaterally        Results Review:    Lab Results (last 24 hours)       Procedure Component Value Units Date/Time    Comprehensive Metabolic Panel [[id number]]  (Abnormal) Collected: 03/05/25 0744    Specimen: Blood Updated: 03/05/25 0915     Glucose 99 mg/dL      BUN 51 mg/dL      Creatinine 1.91 mg/dL      Sodium 132 mmol/L      Potassium 5.1 mmol/L      Comment: Slight hemolysis detected by analyzer. Result may be falsely elevated.        Chloride 97 mmol/L      CO2 26.8 mmol/L      Calcium 9.5 mg/dL      Total Protein 5.8 g/dL      Albumin 3.1 g/dL      ALT (SGPT) <5 U/L      AST (SGOT) 13 U/L      Alkaline Phosphatase 69 U/L      Total Bilirubin 0.4 mg/dL      Globulin 2.7 gm/dL      A/G Ratio 1.1 g/dL      BUN/Creatinine Ratio 26.7     Anion Gap 8.2 mmol/L      eGFR 26.6 mL/min/1.73     Narrative:      GFR Categories in Chronic Kidney Disease (CKD)      GFR Category          GFR (mL/min/1.73)    Interpretation  G1                     90 or greater         Normal or high (1)  G2                      60-89                Mild decrease (1)  G3a                   45-59                Mild to moderate decrease  G3b                   30-44                Moderate to severe decrease  G4                    15-29                Severe decrease  G5                    14 or less           Kidney failure          (1)In the absence of evidence of kidney disease, neither GFR category G1 or G2 fulfill the criteria for CKD.    eGFR calculation 2021 CKD-EPI creatinine equation, which does not include race as a factor    Phosphorus [[id number]]  (Normal) Collected: 03/05/25 0744    Specimen: Blood Updated: 03/05/25 0840     Phosphorus 3.3 mg/dL     Magnesium [[id number]]  (Normal) Collected: 03/05/25 0744    Specimen: Blood Updated: 03/05/25 0839     Magnesium 2.0 mg/dL     CANDIDA A[facility]IS PCR - Swab, Axilla Right, Axilla Left and Groin [[id number]]   (Normal) Collected: 03/04/25 0347    Specimen: Swab from Axilla Right, Axilla Left and Groin Updated: 03/05/25 0815     CANDIDA A[facility]IS PCR Not Detected    CBC & Differential [[id number]]  (Abnormal) Collected: 03/05/25 0744    Specimen: Blood Updated: 03/05/25 0753    Narrative:      The following orders were created for panel order CBC & Differential.  Procedure                               Abnormality         Status                     ---------                               -----------         ------                     CBC Auto Differential[[id number]]        Abnormal            Final result                 Please view results for these tests on the individual orders.    CBC Auto Differential [[id number]]  (Abnormal) Collected: 03/05/25 0744    Specimen: Blood Updated: 03/05/25 0753     WBC 8.11 10*3/mm3      RBC 2.73 10*6/mm3      Hemoglobin 8.1 g/dL      Hematocrit 28.1 %      .9 fL      MCH 29.7 pg      MCHC 28.8 g/dL      RDW 17.7 %      RDW-SD 63.5 fl      MPV 9.6 fL      Platelets 168 10*3/mm3      Neutrophil % 71.1 %      Lymphocyte % 11.6 %      Monocyte % 9.7 %      Eosinophil % 6.8 %      Basophil % 0.2 %      Immature Grans % 0.6 %      Neutrophils, Absolute 5.76 10*3/mm3      Lymphocytes, Absolute 0.94 10*3/mm3      Monocytes, Absolute 0.79 10*3/mm3      Eosinophils, Absolute 0.55 10*3/mm3      Basophils, Absolute 0.02 10*3/mm3      Immature Grans, Absolute 0.05 10*3/mm3      nRBC 0.0 /100 WBC     Tacrolimus Level [[id number]] Collected: 03/05/25 0744    Specimen: Blood Updated: 03/05/25 0748    Blood Culture - Blood, Arm, Left [[id number]]  (Normal) Collected: 03/03/25 1716    Specimen: Blood from Arm, Left Updated: 03/04/25 1731     Blood Culture No growth at 24 hours    Narrative:      Less than seven (7) mL's of blood was collected.  Insufficient quantity may yield false negative results.    Blood Culture - Blood, Arm, Left [[id number]]  (Normal) Collected: 03/03/25 1716    Specimen: Blood from  Arm, Left Updated: 03/04/25 1731     Blood Culture No growth at 24 hours    MRSA Screen, PCR (Inpatient) - Swab, Nares [[id number]]  (Normal) Collected: 03/04/25 0951    Specimen: Swab from Nares Updated: 03/04/25 1111     MRSA PCR No MRSA Detected    Narrative:      The negative predictive value of this diagnostic test is high and should only be used to consider de-escalating anti-MRSA therapy. A positive result may indicate colonization with MRSA and must be correlated clinically.    Urine Culture - Urine, Straight Cath [[id number]]  (Normal) Collected: 03/03/25 1733    Specimen: Urine from Straight Cath Updated: 03/04/25 1032     Urine Culture Culture in progress             Imaging Results (Last 24 Hours)       Procedure Component Value Units Date/Time    NM Lung Ventilation Perfusion Aerosol [[id number]] Collected: 03/04/25 1244     Updated: 03/04/25 1250    Narrative:      NM LUNG VENTILATION PERFUSION AEROSOL    Date of Exam: 3/4/2025 12:20 PM EST    Indication: Shortness of breath, R/O PE.    Comparison: Chest radiograph 3/3/2025    Technique:  The patient was ventilated with 42 mCi of technetium 99m pyrophosphate aerosol and ventilation images were acquired in multiple obliquities. The patient then received 5.5 mCi of technetium 99m MAA intravenously and perfusion images were   acquired in multiple obliquities.      Findings:  Significant bilateral ventilation defects likely in keeping with known pulmonary edema, pleural effusions and/or underlying airspace disease. There are multiple small matched peripheral defects, without large peripheral wedge-shaped mismatch defect to   suggest pulmonary embolism.      Impression:      Impression:  No convincing pulmonary embolism.    Significantly abnormal ventilation likely reflecting patient's known pleural effusions, edema and/or underlying airspace disease.        Electronically Signed: Jaden Cummings MD    3/4/2025 12:48 PM EST    Workstation ID: [id number]                  I reviewed the patient's new clinical results.    Medication Review:   Scheduled Meds:apixaban, 5 mg, Oral, Q12H  cefTRIAXone, 1,000 mg, Intravenous, Q24H  DULoxetine, 40 mg, Oral, Daily  furosemide, 60 mg, Intravenous, Once  HYDROcodone-acetaminophen, 1 tablet, Oral, Q6H  levothyroxine, 137 mcg, Oral, Q AM  metoprolol tartrate, 12.5 mg, Oral, Q12H  midodrine, 15 mg, Oral, Q8H  mirtazapine, 15 mg, Oral, Nightly  mupirocin, 1 Application, Each Nare, BID  pantoprazole, 40 mg, Oral, Q AM  polyethylene glycol, 17 g, Oral, Daily  [Held by provider] sacubitril-valsartan, 1 tablet, Oral, Q12H  sennosides-docusate, 1 tablet, Oral, BID  sodium chloride, 10 mL, Intravenous, Q12H  sodium chloride, 10 mL, Intravenous, Q12H  tacrolimus, 0.5 mg, Oral, BID  vitamin B-12, 1,000 mcg, Oral, Daily      Continuous Infusions:   PRN Meds:.  acetaminophen **OR** acetaminophen    aluminum-magnesium hydroxide-simethicone    senna-docusate sodium **AND** polyethylene glycol **AND** bisacodyl **AND** bisacodyl    hydrOXYzine    [Held by provider] LORazepam    nitroglycerin    ondansetron ODT **OR** ondansetron    prochlorperazine    [COMPLETED] Insert Peripheral IV **AND** sodium chloride    sodium chloride    sodium chloride    sodium chloride    sodium chloride     Assessment & Plan       UTI (urinary tract infection)    COPD (chronic obstructive pulmonary disease)    Hypothyroidism, unspecified    History of kidney transplant    Long term (current) use of immunosuppressive biologic    Long term current use of anticoagulant therapy    History of DVT (deep vein thrombosis)    Pulmonary hypertension    Edema of both lower extremities due to peripheral venous insufficiency    Mixed anxiety and depressive disorder    Essential (primary) hypertension    Paroxysmal atrial fibrillation    Acute exacerbation of CHF (congestive heart failure)    End stage renal disease    Edema of right upper arm    TONYA (acute kidney injury)    -Clinically  she is markedly improved with treatment of UTI.  Continue ceftriaxone and follow urine culture.  -Patient has chronic kidney disease and is followed by Dr. Falcon's group.  Creatinine is at her baseline but she is markedly fluid overloaded.  Nephrology following  -Continue midodrine for blood pressure support  -Continue levothyroxine  - holding Entresto until BP recovers  - increasing levothyroxine for subtherapeutic  T4  -Remeron for appetite stimulant  -Continue antirejection meds for h/o kidney transplant  -Hg stable, improved compared to previous admission    DNR per pt wishes.     Plan for disposition:back to [facility] when able    Nilsa Lomeli MD  03/05/25  09:54 EST

## 2025-03-05 NOTE — CASE MANAGEMENT/SOCIAL WORK
Continued Stay Note   Victor Hugo     Patient Name: Jaja Carcamo  MRN: [id number]  Today's Date: 3/5/2025    Admit Date: 3/3/2025    Plan: Plan to return to [facility]. Precert required to return as SNF. No PASRR required.   Discharge Plan       Row Name 03/05/25 1204       Plan    Plan Plan to return to [facility]. Precert required to return as SNF. No PASRR required.    Plan Comments DC Barriers: 4L NC, IV Abxs, IV Lasix (plan for PO), monitor renal functions, Midodrine, Nephro/WC/Vasc Sx following.               Expected Discharge Date and Time       Expected Discharge Date Expected Discharge Time    Mar 7, 2025               BEAU Hassan RN  ICU/CVU   O: [phone]  C: [phone]  [email or web address]

## 2025-03-06 LAB
ALBUMIN SERPL-MCNC: 3.2 G/DL (ref 3.5–5.2)
ALBUMIN/GLOB SERPL: 1.1 G/DL
ALP SERPL-CCNC: 75 U/L (ref 39–117)
ALT SERPL W P-5'-P-CCNC: <5 U/L (ref 1–33)
ANION GAP SERPL CALCULATED.3IONS-SCNC: 7.4 MMOL/L (ref 5–15)
AST SERPL-CCNC: 15 U/L (ref 1–32)
BASOPHILS # BLD AUTO: 0.02 10*3/MM3 (ref 0–0.2)
BASOPHILS NFR BLD AUTO: 0.3 % (ref 0–1.5)
BILIRUB SERPL-MCNC: 0.5 MG/DL (ref 0–1.2)
BUN SERPL-MCNC: 51 MG/DL (ref 8–23)
BUN/CREAT SERPL: 29.1 (ref 7–25)
CALCIUM SPEC-SCNC: 9.8 MG/DL (ref 8.6–10.5)
CHLORIDE SERPL-SCNC: 98 MMOL/L (ref 98–107)
CO2 SERPL-SCNC: 30.6 MMOL/L (ref 22–29)
CREAT SERPL-MCNC: 1.75 MG/DL (ref 0.57–1)
DEPRECATED RDW RBC AUTO: 64 FL (ref 37–54)
EGFRCR SERPLBLD CKD-EPI 2021: 29.5 ML/MIN/1.73
EOSINOPHIL # BLD AUTO: 0.55 10*3/MM3 (ref 0–0.4)
EOSINOPHIL NFR BLD AUTO: 7.6 % (ref 0.3–6.2)
ERYTHROCYTE [DISTWIDTH] IN BLOOD BY AUTOMATED COUNT: 18 % (ref 12.3–15.4)
GLOBULIN UR ELPH-MCNC: 2.9 GM/DL
GLUCOSE SERPL-MCNC: 96 MG/DL (ref 65–99)
HCT VFR BLD AUTO: 29.6 % (ref 34–46.6)
HGB BLD-MCNC: 8.6 G/DL (ref 12–15.9)
IMM GRANULOCYTES # BLD AUTO: 0.02 10*3/MM3 (ref 0–0.05)
IMM GRANULOCYTES NFR BLD AUTO: 0.3 % (ref 0–0.5)
LYMPHOCYTES # BLD AUTO: 1.05 10*3/MM3 (ref 0.7–3.1)
LYMPHOCYTES NFR BLD AUTO: 14.6 % (ref 19.6–45.3)
MAGNESIUM SERPL-MCNC: 2 MG/DL (ref 1.6–2.4)
MCH RBC QN AUTO: 29.9 PG (ref 26.6–33)
MCHC RBC AUTO-ENTMCNC: 29.1 G/DL (ref 31.5–35.7)
MCV RBC AUTO: 102.8 FL (ref 79–97)
MONOCYTES # BLD AUTO: 0.7 10*3/MM3 (ref 0.1–0.9)
MONOCYTES NFR BLD AUTO: 9.7 % (ref 5–12)
NEUTROPHILS NFR BLD AUTO: 4.86 10*3/MM3 (ref 1.7–7)
NEUTROPHILS NFR BLD AUTO: 67.5 % (ref 42.7–76)
NRBC BLD AUTO-RTO: 0 /100 WBC (ref 0–0.2)
PHOSPHATE SERPL-MCNC: 3.3 MG/DL (ref 2.5–4.5)
PLATELET # BLD AUTO: 167 10*3/MM3 (ref 140–450)
PMV BLD AUTO: 9.1 FL (ref 6–12)
POTASSIUM SERPL-SCNC: 4.9 MMOL/L (ref 3.5–5.2)
PROT SERPL-MCNC: 6.1 G/DL (ref 6–8.5)
RBC # BLD AUTO: 2.88 10*6/MM3 (ref 3.77–5.28)
SODIUM SERPL-SCNC: 136 MMOL/L (ref 136–145)
WBC NRBC COR # BLD AUTO: 7.2 10*3/MM3 (ref 3.4–10.8)

## 2025-03-06 PROCEDURE — 80053 COMPREHEN METABOLIC PANEL: CPT

## 2025-03-06 PROCEDURE — 99222 1ST HOSP IP/OBS MODERATE 55: CPT | Performed by: INTERNAL MEDICINE

## 2025-03-06 PROCEDURE — 85025 COMPLETE CBC W/AUTO DIFF WBC: CPT

## 2025-03-06 PROCEDURE — 63710000001 TACROLIMUS PER 1 MG: Performed by: INTERNAL MEDICINE

## 2025-03-06 PROCEDURE — 84100 ASSAY OF PHOSPHORUS: CPT

## 2025-03-06 PROCEDURE — 94799 UNLISTED PULMONARY SVC/PX: CPT

## 2025-03-06 PROCEDURE — 94660 CPAP INITIATION&MGMT: CPT

## 2025-03-06 PROCEDURE — 94640 AIRWAY INHALATION TREATMENT: CPT

## 2025-03-06 PROCEDURE — 25010000002 CEFTRIAXONE PER 250 MG

## 2025-03-06 PROCEDURE — 83735 ASSAY OF MAGNESIUM: CPT

## 2025-03-06 PROCEDURE — 94761 N-INVAS EAR/PLS OXIMETRY MLT: CPT

## 2025-03-06 PROCEDURE — 25010000002 FUROSEMIDE PER 20 MG: Performed by: INTERNAL MEDICINE

## 2025-03-06 RX ORDER — FUROSEMIDE 10 MG/ML
40 INJECTION INTRAMUSCULAR; INTRAVENOUS ONCE
Status: COMPLETED | OUTPATIENT
Start: 2025-03-06 | End: 2025-03-06

## 2025-03-06 RX ORDER — ALBUTEROL SULFATE 0.63 MG/3ML
0.63 SOLUTION RESPIRATORY (INHALATION) EVERY 6 HOURS PRN
Status: DISCONTINUED | OUTPATIENT
Start: 2025-03-06 | End: 2025-03-13 | Stop reason: HOSPADM

## 2025-03-06 RX ORDER — ECHINACEA PURPUREA EXTRACT 125 MG
2 TABLET ORAL AS NEEDED
Status: DISCONTINUED | OUTPATIENT
Start: 2025-03-06 | End: 2025-03-13 | Stop reason: HOSPADM

## 2025-03-06 RX ADMIN — HYDROCODONE BITARTRATE AND ACETAMINOPHEN 1 TABLET: 10; 325 TABLET ORAL at 12:32

## 2025-03-06 RX ADMIN — MIDODRINE HYDROCHLORIDE 15 MG: 5 TABLET ORAL at 21:43

## 2025-03-06 RX ADMIN — ALBUTEROL SULFATE 0.63 MG: 0.63 SOLUTION RESPIRATORY (INHALATION) at 20:28

## 2025-03-06 RX ADMIN — HYDROCODONE BITARTRATE AND ACETAMINOPHEN 1 TABLET: 10; 325 TABLET ORAL at 05:33

## 2025-03-06 RX ADMIN — Medication 10 ML: at 09:50

## 2025-03-06 RX ADMIN — HYDROCODONE BITARTRATE AND ACETAMINOPHEN 1 TABLET: 10; 325 TABLET ORAL at 00:06

## 2025-03-06 RX ADMIN — DULOXETINE 40 MG: 20 CAPSULE, DELAYED RELEASE ORAL at 09:09

## 2025-03-06 RX ADMIN — POLYETHYLENE GLYCOL 3350 17 G: 17 POWDER, FOR SOLUTION ORAL at 09:08

## 2025-03-06 RX ADMIN — HYDROCODONE BITARTRATE AND ACETAMINOPHEN 1 TABLET: 10; 325 TABLET ORAL at 17:10

## 2025-03-06 RX ADMIN — HYDROCODONE BITARTRATE AND ACETAMINOPHEN 1 TABLET: 10; 325 TABLET ORAL at 23:36

## 2025-03-06 RX ADMIN — HYDROXYZINE HYDROCHLORIDE 25 MG: 25 TABLET, FILM COATED ORAL at 21:43

## 2025-03-06 RX ADMIN — MIDODRINE HYDROCHLORIDE 15 MG: 5 TABLET ORAL at 05:33

## 2025-03-06 RX ADMIN — MIDODRINE HYDROCHLORIDE 15 MG: 5 TABLET ORAL at 14:27

## 2025-03-06 RX ADMIN — ACETAMINOPHEN 650 MG: 325 TABLET, FILM COATED ORAL at 04:34

## 2025-03-06 RX ADMIN — MUPIROCIN 1 APPLICATION: 20 OINTMENT TOPICAL at 09:09

## 2025-03-06 RX ADMIN — MIRTAZAPINE 15 MG: 15 TABLET, FILM COATED ORAL at 21:43

## 2025-03-06 RX ADMIN — MUPIROCIN 1 APPLICATION: 20 OINTMENT TOPICAL at 21:43

## 2025-03-06 RX ADMIN — APIXABAN 5 MG: 5 TABLET, FILM COATED ORAL at 09:10

## 2025-03-06 RX ADMIN — TACROLIMUS 0.5 MG: 0.5 CAPSULE ORAL at 09:10

## 2025-03-06 RX ADMIN — APIXABAN 5 MG: 5 TABLET, FILM COATED ORAL at 21:43

## 2025-03-06 RX ADMIN — FUROSEMIDE 40 MG: 10 INJECTION, SOLUTION INTRAMUSCULAR; INTRAVENOUS at 12:32

## 2025-03-06 RX ADMIN — Medication 1000 MCG: at 09:53

## 2025-03-06 RX ADMIN — CEFTRIAXONE SODIUM 1000 MG: 1 INJECTION, POWDER, FOR SOLUTION INTRAMUSCULAR; INTRAVENOUS at 17:10

## 2025-03-06 RX ADMIN — TACROLIMUS 0.5 MG: 0.5 CAPSULE ORAL at 21:43

## 2025-03-06 RX ADMIN — Medication 10 ML: at 21:47

## 2025-03-06 RX ADMIN — SALINE NASAL SPRAY 2 SPRAY: 1.5 SOLUTION NASAL at 16:15

## 2025-03-06 RX ADMIN — LEVOTHYROXINE SODIUM 137 MCG: 0.03 TABLET ORAL at 05:33

## 2025-03-06 RX ADMIN — SENNOSIDES AND DOCUSATE SODIUM 1 TABLET: 50; 8.6 TABLET ORAL at 09:10

## 2025-03-06 NOTE — CASE MANAGEMENT/SOCIAL WORK
Continued Stay Note   Victor Hugo     Patient Name: Jaja Carcamo  MRN: [id number]  Today's Date: 3/6/2025    Admit Date: 3/3/2025    Plan: Plan to return to [facility]. Precert required to return as SNF. No PASRR required.   Discharge Plan       Row Name 03/06/25 0942       Plan    Plan Plan to return to [facility]. Precert required to return as SNF. No PASRR required.    Plan Comments DC Barriers: 4L NC, IV Abxs, monitor renal functions, Midodrine, u/a culture pending, Nephro/WC/Vasc Sx following.                 Expected Discharge Date and Time       Expected Discharge Date Expected Discharge Time    Mar 7, 2025               BEAU Hassan RN  ICU/CVU   O: [phone]  C: [phone]  [email or web address]

## 2025-03-06 NOTE — PROGRESS NOTES
"RENAL/KCC:     LOS: 3 days    Patient Care Team:  Kvng Guillen MD as PCP - General (Family Medicine)  Jak Gavin MD as Cardiologist (Cardiology)    Chief Complaint:  TONYA/CKD, Kidney Transplant    Subjective     Interval History:   Chart reviewed  States no appetitie    Objective     Vital Sign Min/Max for last 24 hours  Temp  Min: 97 °F (36.1 °C)  Max: 98.2 °F (36.8 °C)   BP  Min: 89/56  Max: 112/57   Pulse  Min: 92  Max: 123   Resp  Min: 18  Max: 20   SpO2  Min: 75 %  Max: 100 %   Flow (L/min) (Oxygen Therapy)  Min: 4  Max: 4   Weight  Min: 78.7 kg (173 lb 8 oz)  Max: 78.7 kg (173 lb 8 oz)     Flowsheet Rows      Flowsheet Row First Filed Value   Admission Height 167.7 cm (66.02\") Documented at 03/03/2025 1641   Admission Weight 81.6 kg (179 lb 14.3 oz) Documented at 03/03/2025 1641            No intake/output data recorded.  I/O last 3 completed shifts:  In: 480 [P.O.:480]  Out: 950 [Urine:950]    Physical Exam:  GEN: Awake, NAD  ENT: PERRL, EOMI, MMM  NECK: Supple, no JVD  CHEST: CTAB, no W/R/C  CV: RRR, no M/G/R, +edema  ABD: Soft, NT, +BS  SKIN: Warm and Dry  NEURO: CN's intact      WBC WBC   Date Value Ref Range Status   03/05/2025 8.11 3.40 - 10.80 10*3/mm3 Final   03/04/2025 8.18 3.40 - 10.80 10*3/mm3 Final   03/03/2025 9.73 3.40 - 10.80 10*3/mm3 Final      HGB Hemoglobin   Date Value Ref Range Status   03/05/2025 8.1 (L) 12.0 - 15.9 g/dL Final   03/04/2025 10.0 (L) 12.0 - 17.0 g/dL Final   03/04/2025 9.1 (L) 12.0 - 15.9 g/dL Final   03/03/2025 9.4 (L) 12.0 - 15.9 g/dL Final      HCT Hematocrit   Date Value Ref Range Status   03/05/2025 28.1 (L) 34.0 - 46.6 % Final   03/04/2025 29 (L) 38 - 51 % Final   03/04/2025 31.5 (L) 34.0 - 46.6 % Final   03/03/2025 32.8 (L) 34.0 - 46.6 % Final      Platlets No results found for: \"LABPLAT\"   MCV MCV   Date Value Ref Range Status   03/05/2025 102.9 (H) 79.0 - 97.0 fL Final   03/04/2025 103.3 (H) 79.0 - 97.0 fL Final   03/03/2025 103.5 (H) 79.0 - 97.0 fL Final    " "      Sodium Sodium   Date Value Ref Range Status   03/05/2025 132 (L) 136 - 145 mmol/L Final   03/04/2025 140 136 - 145 mmol/L Final   03/04/2025 143 136 - 145 mmol/L Final   03/03/2025 135 (L) 136 - 145 mmol/L Final      Potassium Potassium   Date Value Ref Range Status   03/05/2025 5.1 3.5 - 5.2 mmol/L Final     Comment:     Slight hemolysis detected by analyzer. Result may be falsely elevated.   03/04/2025 4.9 3.5 - 5.2 mmol/L Final   03/04/2025 4.8 3.5 - 5.2 mmol/L Final   03/03/2025 5.3 (H) 3.5 - 5.2 mmol/L Final      Chloride Chloride   Date Value Ref Range Status   03/05/2025 97 (L) 98 - 107 mmol/L Final   03/04/2025 103 98 - 107 mmol/L Final   03/04/2025 104 98 - 107 mmol/L Final   03/03/2025 97 (L) 98 - 107 mmol/L Final      CO2 CO2   Date Value Ref Range Status   03/05/2025 26.8 22.0 - 29.0 mmol/L Final   03/04/2025 28.3 22.0 - 29.0 mmol/L Final   03/04/2025 29.9 (H) 22.0 - 29.0 mmol/L Final   03/03/2025 30.8 (H) 22.0 - 29.0 mmol/L Final      BUN BUN   Date Value Ref Range Status   03/05/2025 51 (H) 8 - 23 mg/dL Final   03/04/2025 48 (H) 8 - 23 mg/dL Final   03/04/2025 48 (H) 8 - 23 mg/dL Final   03/03/2025 48 (H) 8 - 23 mg/dL Final      Creatinine Creatinine   Date Value Ref Range Status   03/05/2025 1.91 (H) 0.57 - 1.00 mg/dL Final   03/04/2025 1.84 (H) 0.57 - 1.00 mg/dL Final   03/04/2025 1.87 (H) 0.57 - 1.00 mg/dL Final   03/04/2025 2.07 (H) 0.60 - 1.30 mg/dL Final     Comment:     Serial Number: [id number]:  [id number]   03/03/2025 2.01 (H) 0.57 - 1.00 mg/dL Final      Calcium Calcium   Date Value Ref Range Status   03/05/2025 9.5 8.6 - 10.5 mg/dL Final   03/04/2025 9.5 8.6 - 10.5 mg/dL Final   03/04/2025 9.2 8.6 - 10.5 mg/dL Final   03/03/2025 9.6 8.6 - 10.5 mg/dL Final      PO4 No results found for: \"CAPO4\"   Albumin Albumin   Date Value Ref Range Status   03/05/2025 3.1 (L) 3.5 - 5.2 g/dL Final   03/04/2025 3.7 3.5 - 5.2 g/dL Final   03/03/2025 3.1 (L) 3.5 - 5.2 g/dL Final      Magnesium Magnesium " "  Date Value Ref Range Status   03/05/2025 2.0 1.6 - 2.4 mg/dL Final   03/04/2025 2.0 1.6 - 2.4 mg/dL Final   03/04/2025 2.0 1.6 - 2.4 mg/dL Final      Uric Acid No results found for: \"URICACID\"        Results Review:     I reviewed the patient's new clinical results.    apixaban, 5 mg, Oral, Q12H  cefTRIAXone, 1,000 mg, Intravenous, Q24H  DULoxetine, 40 mg, Oral, Daily  HYDROcodone-acetaminophen, 1 tablet, Oral, Q6H  levothyroxine, 137 mcg, Oral, Q AM  metoprolol tartrate, 12.5 mg, Oral, Q12H  midodrine, 15 mg, Oral, Q8H  mirtazapine, 15 mg, Oral, Nightly  mupirocin, 1 Application, Each Nare, BID  polyethylene glycol, 17 g, Oral, Daily  [Held by provider] sacubitril-valsartan, 1 tablet, Oral, Q12H  sennosides-docusate, 1 tablet, Oral, BID  sodium chloride, 10 mL, Intravenous, Q12H  sodium chloride, 10 mL, Intravenous, Q12H  tacrolimus, 0.5 mg, Oral, BID  vitamin B-12, 1,000 mcg, Oral, Daily      Medication Review: Reviewed    Assessment & Plan     TONYA  CKD3  Kidney Transplant  UTI  Fluid overload  Hypotension  Viral URI  RUE swelling - AVF with thrill and bruit  Anemia     PLAN: Awaiting labs this AM.  Cr stable at 1.9 yesterday (prior baseline 1.2).  Continue Prograf and follow-up level.  Continue Midodrine for BP support.  Diuretics prn as tolerated by renal function.  Appreciate Vascular consult.  Continue to elevated RUE as able.  ABX per primary.  Will follow closely.        Naun Falcon MD  [facility]  03/06/25  08:02 EST      "

## 2025-03-06 NOTE — PLAN OF CARE
Problem: Violence Risk or Actual  Goal: Anger and Impulse Control  Outcome: Progressing     Problem: Noninvasive Ventilation Acute  Goal: Effective Unassisted Ventilation and Oxygenation  Outcome: Progressing     Problem: Adult Inpatient Plan of Care  Goal: Plan of Care Review  Outcome: Progressing  Goal: Patient-Specific Goal (Individualized)  Outcome: Progressing  Goal: Absence of Hospital-Acquired Illness or Injury  Outcome: Progressing  Goal: Optimal Comfort and Wellbeing  Outcome: Progressing  Intervention: Monitor Pain and Promote Comfort  Goal: Readiness for Transition of Care  Outcome: Progressing     Problem: Comorbidity Management  Goal: Maintenance of Osteoarthritis Symptom Control  Outcome: Progressing     Problem: Skin Injury Risk Increased  Goal: Skin Health and Integrity  Outcome: Progressing     Problem: Fall Injury Risk  Goal: Absence of Fall and Fall-Related Injury  Outcome: Progressing     Problem: Sepsis/Septic Shock  Goal: Optimal Coping  Outcome: Progressing  Goal: Absence of Bleeding  Outcome: Progressing  Goal: Blood Glucose Level Within Target Range  Outcome: Progressing  Goal: Absence of Infection Signs and Symptoms  Outcome: Progressing  Goal: Optimal Nutrition Delivery  Outcome: Progressing   Goal Outcome Evaluation:

## 2025-03-06 NOTE — PROGRESS NOTES
LOS: 3 days   Patient Care Team:  Kvng Guillen MD as PCP - General (Family Medicine)  Jak Gavin MD as Cardiologist (Cardiology)    Subjective     Interval History:     Patient Complaints: pt is feeling a little better    History taken from: patient    Review of Systems   Constitutional:  Positive for activity change, appetite change and fatigue.   HENT: Negative.     Respiratory: Negative.     Cardiovascular:  Positive for leg swelling. Negative for chest pain and palpitations.   Gastrointestinal: Negative.    Musculoskeletal:  Positive for arthralgias, back pain and gait problem.   Neurological:  Positive for weakness.           Objective     Vital Signs  Temp:  [97 °F (36.1 °C)-98.2 °F (36.8 °C)] 97.1 °F (36.2 °C)  Heart Rate:  [] 110  Resp:  [18-20] 18  BP: ()/(44-59) 104/51    Physical Exam:     General Appearance:    Alert, cooperative, in no acute distress   Head:    Normocephalic, without obvious abnormality, atraumatic   Eyes:            Lids and lashes normal, conjunctivae and sclerae normal, no   icterus, no pallor, corneas clear, PERRLA   Ears:    Ears appear intact with no abnormalities noted   Throat:   No oral lesions, no thrush, oral mucosa moist   Neck:   No adenopathy, supple, trachea midline, no thyromegaly, no   carotid bruit, no JVD   Lungs:     Clear to auscultation,respirations regular, even and                  unlabored    Heart:    Regular rhythm and normal rate, normal S1 and S2, no            murmur, no gallop, no rub, no click   Chest Wall:    No abnormalities observed   Abdomen:     Normal bowel sounds, no masses, no organomegaly, soft        non-tender, non-distended, no guarding, no rebound                tenderness   Extremities:   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    Pulses:   Pulses palpable and equal bilaterally   Skin:   Scattered bruising, pasha left arm   Lymph nodes:   No palpable adenopathy   Neurologic:   Cranial nerves 2 - 12  grossly intact, sensation intact, DTR       present and equal bilaterally        Results Review:    Lab Results (last 24 hours)       Procedure Component Value Units Date/Time    Blood Culture - Blood, Arm, Left [[id number]]  (Normal) Collected: 03/03/25 1716    Specimen: Blood from Arm, Left Updated: 03/05/25 1731     Blood Culture No growth at 2 days    Blood Culture - Blood, Arm, Left [[id number]]  (Normal) Collected: 03/03/25 1716    Specimen: Blood from Arm, Left Updated: 03/05/25 1731     Blood Culture No growth at 2 days    Narrative:      Less than seven (7) mL's of blood was collected.  Insufficient quantity may yield false negative results.    Urine Culture - Urine, Straight Cath [[id number]] Collected: 03/03/25 1733    Specimen: Urine from Straight Cath Updated: 03/05/25 1049     Urine Culture <25,000 CFU/mL Normal Urogenital Radha    Narrative:      Colonization of the urinary tract without infection is common. Treatment is discouraged unless the patient is symptomatic, pregnant, or undergoing an invasive urologic procedure.    Comprehensive Metabolic Panel [[id number]]  (Abnormal) Collected: 03/05/25 0744    Specimen: Blood Updated: 03/05/25 0915     Glucose 99 mg/dL      BUN 51 mg/dL      Creatinine 1.91 mg/dL      Sodium 132 mmol/L      Potassium 5.1 mmol/L      Comment: Slight hemolysis detected by analyzer. Result may be falsely elevated.        Chloride 97 mmol/L      CO2 26.8 mmol/L      Calcium 9.5 mg/dL      Total Protein 5.8 g/dL      Albumin 3.1 g/dL      ALT (SGPT) <5 U/L      AST (SGOT) 13 U/L      Alkaline Phosphatase 69 U/L      Total Bilirubin 0.4 mg/dL      Globulin 2.7 gm/dL      A/G Ratio 1.1 g/dL      BUN/Creatinine Ratio 26.7     Anion Gap 8.2 mmol/L      eGFR 26.6 mL/min/1.73     Narrative:      GFR Categories in Chronic Kidney Disease (CKD)      GFR Category          GFR (mL/min/1.73)    Interpretation  G1                     90 or greater         Normal or high (1)  G2                       60-89                Mild decrease (1)  G3a                   45-59                Mild to moderate decrease  G3b                   30-44                Moderate to severe decrease  G4                    15-29                Severe decrease  G5                    14 or less           Kidney failure          (1)In the absence of evidence of kidney disease, neither GFR category G1 or G2 fulfill the criteria for CKD.    eGFR calculation 2021 CKD-EPI creatinine equation, which does not include race as a factor             Imaging Results (Last 24 Hours)       ** No results found for the last 24 hours. **                 I reviewed the patient's new clinical results.    Medication Review:   Scheduled Meds:apixaban, 5 mg, Oral, Q12H  cefTRIAXone, 1,000 mg, Intravenous, Q24H  DULoxetine, 40 mg, Oral, Daily  HYDROcodone-acetaminophen, 1 tablet, Oral, Q6H  levothyroxine, 137 mcg, Oral, Q AM  metoprolol tartrate, 12.5 mg, Oral, Q12H  midodrine, 15 mg, Oral, Q8H  mirtazapine, 15 mg, Oral, Nightly  mupirocin, 1 Application, Each Nare, BID  polyethylene glycol, 17 g, Oral, Daily  [Held by provider] sacubitril-valsartan, 1 tablet, Oral, Q12H  sennosides-docusate, 1 tablet, Oral, BID  sodium chloride, 10 mL, Intravenous, Q12H  sodium chloride, 10 mL, Intravenous, Q12H  tacrolimus, 0.5 mg, Oral, BID  vitamin B-12, 1,000 mcg, Oral, Daily      Continuous Infusions:   PRN Meds:.  acetaminophen **OR** acetaminophen    aluminum-magnesium hydroxide-simethicone    senna-docusate sodium **AND** polyethylene glycol **AND** bisacodyl **AND** bisacodyl    hydrOXYzine    [Held by provider] LORazepam    nitroglycerin    ondansetron ODT **OR** ondansetron    prochlorperazine    [COMPLETED] Insert Peripheral IV **AND** sodium chloride    sodium chloride    sodium chloride    sodium chloride    sodium chloride     Assessment & Plan       UTI (urinary tract infection)    COPD (chronic obstructive pulmonary disease)    Hypothyroidism,  unspecified    History of kidney transplant    Long term (current) use of immunosuppressive biologic    Long term current use of anticoagulant therapy    History of DVT (deep vein thrombosis)    Pulmonary hypertension    Decubitus ulcer of foot, stage 3    Edema of both lower extremities due to peripheral venous insufficiency    Mixed anxiety and depressive disorder    Essential (primary) hypertension    Paroxysmal atrial fibrillation    Acute exacerbation of CHF (congestive heart failure)    End stage renal disease    Edema of right upper arm    TONYA (acute kidney injury)    Pressure injury of right heel, stage 3    -Clinically she is markedly improved with treatment of UTI.  Continue ceftriaxone and follow urine culture.  -Patient has chronic kidney disease and is followed by Dr. Falcon's group.  Creatinine is slightly higher than her baseline but she is markedly fluid overloaded.  Nephrology following  -Continue midodrine for blood pressure support  -Continue levothyroxine  - holding Entresto until BP recovers  - increasing levothyroxine for subtherapeutic  T4  -Remeron for appetite stimulant  -Continue antirejection meds for h/o kidney transplant  -Hg stable, improved compared to previous admission      Plan for disposition:back to [facility] when able    Nilsa Lomeli MD  03/06/25  09:09 EST

## 2025-03-06 NOTE — PLAN OF CARE
Goal Outcome Evaluation:      She is still somewhat confused but seems more alert and oriented than when she first got here. Vitals have been stable throughout shift.

## 2025-03-07 LAB
ALBUMIN SERPL-MCNC: 3.1 G/DL (ref 3.5–5.2)
ALBUMIN/GLOB SERPL: 1.1 G/DL
ALP SERPL-CCNC: 72 U/L (ref 39–117)
ALT SERPL W P-5'-P-CCNC: <5 U/L (ref 1–33)
ANION GAP SERPL CALCULATED.3IONS-SCNC: 11 MMOL/L (ref 5–15)
AST SERPL-CCNC: 13 U/L (ref 1–32)
BASOPHILS # BLD AUTO: 0.02 10*3/MM3 (ref 0–0.2)
BASOPHILS NFR BLD AUTO: 0.2 % (ref 0–1.5)
BILIRUB SERPL-MCNC: 0.5 MG/DL (ref 0–1.2)
BUN SERPL-MCNC: 50 MG/DL (ref 8–23)
BUN/CREAT SERPL: 35.5 (ref 7–25)
CALCIUM SPEC-SCNC: 9.7 MG/DL (ref 8.6–10.5)
CHLORIDE SERPL-SCNC: 97 MMOL/L (ref 98–107)
CO2 SERPL-SCNC: 28 MMOL/L (ref 22–29)
CREAT SERPL-MCNC: 1.41 MG/DL (ref 0.57–1)
DEPRECATED RDW RBC AUTO: 67.2 FL (ref 37–54)
EGFRCR SERPLBLD CKD-EPI 2021: 38.3 ML/MIN/1.73
EOSINOPHIL # BLD AUTO: 0.55 10*3/MM3 (ref 0–0.4)
EOSINOPHIL NFR BLD AUTO: 6.5 % (ref 0.3–6.2)
ERYTHROCYTE [DISTWIDTH] IN BLOOD BY AUTOMATED COUNT: 18.2 % (ref 12.3–15.4)
GLOBULIN UR ELPH-MCNC: 2.9 GM/DL
GLUCOSE SERPL-MCNC: 83 MG/DL (ref 65–99)
HCT VFR BLD AUTO: 28.7 % (ref 34–46.6)
HGB BLD-MCNC: 8.4 G/DL (ref 12–15.9)
IMM GRANULOCYTES # BLD AUTO: 0.02 10*3/MM3 (ref 0–0.05)
IMM GRANULOCYTES NFR BLD AUTO: 0.2 % (ref 0–0.5)
LYMPHOCYTES # BLD AUTO: 1.38 10*3/MM3 (ref 0.7–3.1)
LYMPHOCYTES NFR BLD AUTO: 16.3 % (ref 19.6–45.3)
MAGNESIUM SERPL-MCNC: 2 MG/DL (ref 1.6–2.4)
MCH RBC QN AUTO: 29.9 PG (ref 26.6–33)
MCHC RBC AUTO-ENTMCNC: 29.3 G/DL (ref 31.5–35.7)
MCV RBC AUTO: 102.1 FL (ref 79–97)
MONOCYTES # BLD AUTO: 0.86 10*3/MM3 (ref 0.1–0.9)
MONOCYTES NFR BLD AUTO: 10.2 % (ref 5–12)
NEUTROPHILS NFR BLD AUTO: 5.62 10*3/MM3 (ref 1.7–7)
NEUTROPHILS NFR BLD AUTO: 66.6 % (ref 42.7–76)
NRBC BLD AUTO-RTO: 0 /100 WBC (ref 0–0.2)
PHOSPHATE SERPL-MCNC: 3.3 MG/DL (ref 2.5–4.5)
PLATELET # BLD AUTO: 161 10*3/MM3 (ref 140–450)
PMV BLD AUTO: 9.2 FL (ref 6–12)
POTASSIUM SERPL-SCNC: 4.7 MMOL/L (ref 3.5–5.2)
PROT SERPL-MCNC: 6 G/DL (ref 6–8.5)
RBC # BLD AUTO: 2.81 10*6/MM3 (ref 3.77–5.28)
SODIUM SERPL-SCNC: 136 MMOL/L (ref 136–145)
WBC NRBC COR # BLD AUTO: 8.45 10*3/MM3 (ref 3.4–10.8)

## 2025-03-07 PROCEDURE — 99232 SBSQ HOSP IP/OBS MODERATE 35: CPT | Performed by: NURSE PRACTITIONER

## 2025-03-07 PROCEDURE — 94799 UNLISTED PULMONARY SVC/PX: CPT

## 2025-03-07 PROCEDURE — 94660 CPAP INITIATION&MGMT: CPT

## 2025-03-07 PROCEDURE — 94664 DEMO&/EVAL PT USE INHALER: CPT

## 2025-03-07 PROCEDURE — 97165 OT EVAL LOW COMPLEX 30 MIN: CPT

## 2025-03-07 PROCEDURE — 80053 COMPREHEN METABOLIC PANEL: CPT

## 2025-03-07 PROCEDURE — 83735 ASSAY OF MAGNESIUM: CPT

## 2025-03-07 PROCEDURE — 25010000002 FUROSEMIDE PER 20 MG: Performed by: INTERNAL MEDICINE

## 2025-03-07 PROCEDURE — 85025 COMPLETE CBC W/AUTO DIFF WBC: CPT

## 2025-03-07 PROCEDURE — 97162 PT EVAL MOD COMPLEX 30 MIN: CPT

## 2025-03-07 PROCEDURE — 84100 ASSAY OF PHOSPHORUS: CPT

## 2025-03-07 PROCEDURE — 63710000001 TACROLIMUS PER 1 MG: Performed by: INTERNAL MEDICINE

## 2025-03-07 RX ORDER — FUROSEMIDE 40 MG/1
80 TABLET ORAL
Status: DISCONTINUED | OUTPATIENT
Start: 2025-03-08 | End: 2025-03-10

## 2025-03-07 RX ORDER — METOPROLOL SUCCINATE 25 MG/1
12.5 TABLET, EXTENDED RELEASE ORAL
Status: DISCONTINUED | OUTPATIENT
Start: 2025-03-07 | End: 2025-03-10

## 2025-03-07 RX ORDER — HYDROXYZINE HYDROCHLORIDE 25 MG/1
25 TABLET, FILM COATED ORAL 3 TIMES DAILY PRN
Status: DISCONTINUED | OUTPATIENT
Start: 2025-03-07 | End: 2025-03-13 | Stop reason: HOSPADM

## 2025-03-07 RX ORDER — FUROSEMIDE 10 MG/ML
80 INJECTION INTRAMUSCULAR; INTRAVENOUS EVERY 12 HOURS
Status: COMPLETED | OUTPATIENT
Start: 2025-03-07 | End: 2025-03-07

## 2025-03-07 RX ADMIN — APIXABAN 5 MG: 5 TABLET, FILM COATED ORAL at 08:13

## 2025-03-07 RX ADMIN — DULOXETINE 40 MG: 20 CAPSULE, DELAYED RELEASE ORAL at 08:13

## 2025-03-07 RX ADMIN — ACETAMINOPHEN 650 MG: 325 TABLET, FILM COATED ORAL at 08:13

## 2025-03-07 RX ADMIN — TACROLIMUS 0.5 MG: 0.5 CAPSULE ORAL at 21:24

## 2025-03-07 RX ADMIN — Medication 10 ML: at 08:14

## 2025-03-07 RX ADMIN — HYDROCODONE BITARTRATE AND ACETAMINOPHEN 1 TABLET: 10; 325 TABLET ORAL at 05:20

## 2025-03-07 RX ADMIN — HYDROCODONE BITARTRATE AND ACETAMINOPHEN 1 TABLET: 10; 325 TABLET ORAL at 11:59

## 2025-03-07 RX ADMIN — MUPIROCIN 1 APPLICATION: 20 OINTMENT TOPICAL at 21:24

## 2025-03-07 RX ADMIN — Medication 1000 MCG: at 08:18

## 2025-03-07 RX ADMIN — HYDROCODONE BITARTRATE AND ACETAMINOPHEN 1 TABLET: 10; 325 TABLET ORAL at 23:51

## 2025-03-07 RX ADMIN — METOPROLOL SUCCINATE 12.5 MG: 25 TABLET, EXTENDED RELEASE ORAL at 08:15

## 2025-03-07 RX ADMIN — MIDODRINE HYDROCHLORIDE 15 MG: 5 TABLET ORAL at 21:23

## 2025-03-07 RX ADMIN — SENNOSIDES AND DOCUSATE SODIUM 1 TABLET: 50; 8.6 TABLET ORAL at 21:23

## 2025-03-07 RX ADMIN — ACETAMINOPHEN 650 MG: 325 TABLET, FILM COATED ORAL at 15:42

## 2025-03-07 RX ADMIN — ALBUTEROL SULFATE 0.63 MG: 0.63 SOLUTION RESPIRATORY (INHALATION) at 13:06

## 2025-03-07 RX ADMIN — MIRTAZAPINE 15 MG: 15 TABLET, FILM COATED ORAL at 21:24

## 2025-03-07 RX ADMIN — POLYETHYLENE GLYCOL 3350 17 G: 17 POWDER, FOR SOLUTION ORAL at 08:12

## 2025-03-07 RX ADMIN — HYDROCODONE BITARTRATE AND ACETAMINOPHEN 1 TABLET: 10; 325 TABLET ORAL at 17:32

## 2025-03-07 RX ADMIN — FUROSEMIDE 80 MG: 10 INJECTION, SOLUTION INTRAMUSCULAR; INTRAVENOUS at 19:35

## 2025-03-07 RX ADMIN — ACETAMINOPHEN 650 MG: 325 TABLET, FILM COATED ORAL at 21:23

## 2025-03-07 RX ADMIN — ALBUTEROL SULFATE 0.63 MG: 0.63 SOLUTION RESPIRATORY (INHALATION) at 18:51

## 2025-03-07 RX ADMIN — MIDODRINE HYDROCHLORIDE 15 MG: 5 TABLET ORAL at 05:20

## 2025-03-07 RX ADMIN — TACROLIMUS 0.5 MG: 0.5 CAPSULE ORAL at 08:17

## 2025-03-07 RX ADMIN — HYDROXYZINE HYDROCHLORIDE 25 MG: 25 TABLET, FILM COATED ORAL at 14:13

## 2025-03-07 RX ADMIN — FUROSEMIDE 80 MG: 10 INJECTION, SOLUTION INTRAMUSCULAR; INTRAVENOUS at 08:13

## 2025-03-07 RX ADMIN — LEVOTHYROXINE SODIUM 137 MCG: 0.03 TABLET ORAL at 05:20

## 2025-03-07 RX ADMIN — APIXABAN 5 MG: 5 TABLET, FILM COATED ORAL at 21:24

## 2025-03-07 RX ADMIN — MUPIROCIN 1 APPLICATION: 20 OINTMENT TOPICAL at 08:13

## 2025-03-07 RX ADMIN — Medication 10 ML: at 19:34

## 2025-03-07 RX ADMIN — MIDODRINE HYDROCHLORIDE 15 MG: 5 TABLET ORAL at 14:13

## 2025-03-07 NOTE — THERAPY EVALUATION
Patient Name: Jaja Carcamo  : 1945    MRN: [id number]                              Today's Date: 3/7/2025       Admit Date: 3/3/2025    Visit Dx:     ICD-10-CM ICD-9-CM   1. TONYA (acute kidney injury)  N17.9 584.9   2. Acute UTI  N39.0 599.0   3. Hypervolemia, unspecified hypervolemia type  E87.70 276.69     Patient Active Problem List   Diagnosis    COPD (chronic obstructive pulmonary disease)    Acute UTI    Atrial fibrillation with rapid ventricular response    Volume overload    Fall    Hypothyroidism, unspecified    Hyperlipidemia    History of suicide attempt    History of lobectomy of lung    Irritable bowel syndrome    History of kidney transplant    Long term (current) use of immunosuppressive biologic    Long term current use of anticoagulant therapy    Menopausal flushing    Mitral valve regurgitation    Tricuspid valve regurgitation    History of lung cancer    Nonobstructive atherosclerosis of coronary artery    Obsessive-compulsive disorder    Osteoarthritis of left hip    Primary osteoarthritis of right hip    Paroxysmal supraventricular tachycardia    Peripheral neuropathy    History of DVT (deep vein thrombosis)    Personal history of peptic ulcer disease    Pulmonary hypertension    Seasonal allergies    Decubitus ulcer of foot, stage 3    History of repair of hip joint    Gout    Gastroesophageal reflux disease    Hearing loss    Edema of both lower extremities due to peripheral venous insufficiency    Mixed anxiety and depressive disorder    Chronic pain    Chronic hypoxemic respiratory failure    Chronic diarrhea    Asthma    Osteoarthritis    Allergic rhinitis    Wegener's granulomatosis with renal involvement    Essential (primary) hypertension    Paroxysmal atrial fibrillation    Valvular heart disease    Acute exacerbation of CHF (congestive heart failure)    UTI (urinary tract infection)    C. difficile colitis    Acute deep vein thrombosis (DVT) of other vein of right upper  extremity    Acute DVT (deep venous thrombosis)    End stage renal disease    Edema of right upper arm    TONYA (acute kidney injury)    Pressure injury of right heel, stage 3     Past Medical History:   Diagnosis Date    Allergic rhinitis 04/14/2015    Asthma 08/10/2017    Atrial flutter 05/11/2017    Chronic diarrhea 04/15/2019    Chronic hypoxemic respiratory failure 01/18/2024    Chronic pain 02/03/2015    COPD (chronic obstructive pulmonary disease)     COVID-19 virus detected 08/11/2022    Cytokine release syndrome, grade 1 08/16/2022    Edema of both lower extremities due to peripheral venous insufficiency 03/12/2021    ESRD on hemodialysis 05/22/2013    Essential (primary) hypertension 03/03/2022    Fracture of ulnar styloid 05/19/2022    Fracture of unspecified carpal bone, right wrist, subsequent encounter for fracture with routine healing 03/03/2022    Gastroesophageal reflux disease 11/12/2020    Gout 08/10/2017    Hearing loss 08/10/2017    History of appendectomy     History of DVT (deep vein thrombosis) 11/12/2020    History of kidney transplant 06/30/2017    History of lobectomy of lung 01/18/2024 03/08/2016:  RIGHT Lower Lobe Mass--> Right Video-assisted thoracoscopy with a moderate-to-large wedge resection of the RLL (by Dr. Haris Mcconnell @ [facility])--> Poorly differentiated carcinoma of the RLL.      History of repair of hip joint 05/21/2013    History of suicide attempt 05/20/2024    Hyperlipidemia 08/11/2014    Hypothyroidism, unspecified 03/03/2022    Infection due to extended spectrum beta lactamase (ESBL) producing bacteria 03/11/2016    No A2K system hx. +ESBL E coli urine on 3/11/16.      Irritable bowel syndrome 04/15/2019    Long term (current) use of immunosuppressive biologic 04/28/2021    Long term current use of anticoagulant therapy 01/18/2024    Malignant neoplasm of lung 08/10/2017    Menopausal flushing 08/30/2021    Mitral valve regurgitation 07/06/2015    Mixed anxiety  and depressive disorder 04/30/2015    Non-small cell lung cancer 08/10/2017    Nonobstructive atherosclerosis of coronary artery 06/02/2019 08/12/2022: CATH: [facility]: NSTEMI assoc with Covid-19: LM:-nl;  LAD: diffuse, Ca++; 30%; CIRC: Dominant. Normal; RCA: small; 50% diffuse, proximal and mid-segment.      NSTEMI (non-ST elevated myocardial infarction) 08/11/2022    Obsessive-compulsive disorder 04/15/2019    Osteoarthritis 05/20/2024    Paroxysmal atrial fibrillation 05/11/2017    Paroxysmal supraventricular tachycardia 05/11/2017    Peripheral neuropathy 08/11/2014    Personal history of peptic ulcer disease 11/12/2020    Postoperative anemia due to acute blood loss 05/22/2013    Right wrist fracture 03/01/2022    Seasonal allergies 08/10/2017    Secondary hyperparathyroidism of renal origin 09/14/2015    Tricuspid valve regurgitation 03/15/2017    Ulcer of lower extremity 08/30/2021    Unspecified acute appendicitis 03/03/2022    Valvular heart disease 05/20/2024    Wegener's granulomatosis with renal involvement 03/03/2022     Past Surgical History:   Procedure Laterality Date    APPENDECTOMY      ARTERIOVENOUS FISTULA/SHUNT SURGERY Right 12/3/2024    Procedure: FISTULOGRAM  and angioplasty RIGHT ARM;  Surgeon: Paulino Alva II, MD;  Location: [facility] HYBRID OR;  Service: Vascular;  Laterality: Right;    BREAST SURGERY Left     cysts rmeoved    CARDIAC CATHETERIZATION Right 08/12/2022    Procedure: Left Heart Cath and coronary angiogram;  Surgeon: Jak Gavin MD;  Location: [facility] CATH INVASIVE LOCATION;  Service: Cardiology;  Laterality: Right;    CLOSED REDUCTION WRIST FRACTURE Right 03/01/2022    Procedure: WRIST CLOSED REDUCTION;  Surgeon: Gaudencio Townsend MD;  Location: [facility] MAIN OR;  Service: Orthopedics;  Laterality: Right;    CYSTOSCOPY      HIP ARTHROPLASTY      HYSTERECTOMY      LUNG LOBECTOMY Right     TRANSPLANTATION RENAL        General Information       Row Name 03/07/25 5561          OT  Time and Intention    Document Type evaluation  -MP     Mode of Treatment occupational therapy  -MP     Patient Effort good  -MP       Row Name 03/07/25 135          General Information    Patient Profile Reviewed yes  -MP     Prior Level of Function independent:;max assist:  -MP     Existing Precautions/Restrictions fall  -MP       Row Name 03/07/25 1356          Living Environment    People in Home facility resident  -MP       Row Name 03/07/25 [facility]          Cognition    Orientation Status (Cognition) oriented x 4  -MP       Row Name 03/07/25 [facility]          Safety Issues/Impairments Affecting Functional Mobility    Impairments Affecting Function (Mobility) balance;endurance/activity tolerance  -MP               User Key  (r) = Recorded By, (t) = Taken By, (c) = Cosigned By      Initials Name Provider Type    Av Eubanks OT Occupational Therapist                     Mobility/ADL's       Row Name 03/07/25 [facility]          Bed Mobility    Bed Mobility bed mobility (all) activities  -     All Activities, Ewing (Bed Mobility) dependent (less than 25% patient effort)  -       Row Name 03/07/25 [facility]          Activities of Daily Living    BADL Assessment/Intervention lower body dressing  -       Row Name 03/07/25 [facility]          Lower Body Dressing Assessment/Training    Ewing Level (Lower Body Dressing) lower body dressing skills;dependent (less than 25% patient effort)  -               User Key  (r) = Recorded By, (t) = Taken By, (c) = Cosigned By      Initials Name Provider Type    Av Eubanks OT Occupational Therapist                   Obj/Interventions       Row Name 03/07/25 [facility]          Range of Motion Comprehensive    Comment, General Range of Motion B shoulder AROM impacted 25-50%  -       Row Name 03/07/25 [facility]          Strength Comprehensive (MMT)    Comment, General Manual Muscle Testing (MMT) Assessment BUE 3+/5  -MP       Row Name 03/07/25 1357          Balance     Balance Interventions static;sitting  -MP               User Key  (r) = Recorded By, (t) = Taken By, (c) = Cosigned By      Initials Name Provider Type    Av Eubanks, KIMBER Occupational Therapist                   Goals/Plan    No documentation.                  Clinical Impression       Row Name 03/07/25 1354          Pain Assessment    Pretreatment Pain Rating 5/10  -MP     Posttreatment Pain Rating 5/10  -MP       Row Name 03/07/25 135          Plan of Care Review    Plan of Care Reviewed With patient  -MP     Progress no change  -MP     Outcome Evaluation Pt. is  is a 80 y.o. female with atrial fibrillation, pulmonary hypertension, HFpEF, COPD, history of kidney transplant, DVT who presents to the hospital from [facility] where she has been staying since Nov 2024. Pt. states she requires max ADL support for dressing/bathing, is able to feed self and participate in grooming activites. Staff A x 2 for transfers in and out of w/c at baseline, family states that it has been 1-2 weeks since patient has been OOB. Pt. provided complete assist for all bed mobility this date, static sitting EOB 2-3 minutes w/ max A to maintain balance. Pt. presents close to baseline functional independence, anticipate d/c back to facility skilled w/ contnued therapy to address mobility deficits. Pt. does not require skilled IP OT at this time.  -MP       Row Name 03/07/25 9522          Therapy Assessment/Plan (OT)    Rehab Potential (OT) good  -MP     Criteria for Skilled Therapeutic Interventions Met (OT) yes;meets criteria;skilled treatment is necessary  -MP     Therapy Frequency (OT) evaluation only  -MP       Row Name 03/07/25 9609          Therapy Plan Review/Discharge Plan (OT)    Anticipated Discharge Disposition (OT) skilled nursing facility  -MP       Row Name 03/07/25 4768          Vital Signs    Pre Patient Position Supine  -MP     Intra Patient Position Sitting  -MP     Post Patient  Position Supine  -MP       Row Name 03/07/25 1358          Positioning and Restraints    Pre-Treatment Position in bed  -MP     Post Treatment Position bed  -MP               User Key  (r) = Recorded By, (t) = Taken By, (c) = Cosigned By      Initials Name Provider Type    Av Eubanks OT Occupational Therapist                   Outcome Measures       Row Name 03/07/25 1253 03/07/25 0415       How much help from another person do you currently need...    Turning from your back to your side while in flat bed without using bedrails? 1  -RR 1  -LK (r) GH (t) LK (c)    Moving from lying on back to sitting on the side of a flat bed without bedrails? 1  -RR 1  -LK (r) GH (t) LK (c)    Moving to and from a bed to a chair (including a wheelchair)? 1  -RR 1  -LK (r) GH (t) LK (c)    Standing up from a chair using your arms (e.g., wheelchair, bedside chair)? 1  -RR 1  -LK (r) GH (t) LK (c)    Climbing 3-5 steps with a railing? 1  -RR 1  -LK (r) GH (t) LK (c)    To walk in hospital room? 1  -RR 1  -LK (r) GH (t) LK (c)    AM-PAC 6 Clicks Score (PT) 6  -RR 6  -GH              User Key  (r) = Recorded By, (t) = Taken By, (c) = Cosigned By      Initials Name Provider Type    Valeria Lira, RN Registered Nurse    Hayden Hopson RNA Registered Nurse    Aiyana Duran, PT Physical Therapist                    Occupational Therapy Education        No education to display                  OT Recommendation and Plan  Therapy Frequency (OT): evaluation only  Plan of Care Review  Plan of Care Reviewed With: patient  Progress: no change  Outcome Evaluation: Pt. is  is a 80 y.o. female with atrial fibrillation, pulmonary hypertension, HFpEF, COPD, history of kidney transplant, DVT who presents to the hospital from [facility] where she has been staying since Nov 2024. Pt. states she requires max ADL support for dressing/bathing, is able to feed self and participate in grooming  activites. Staff A x 2 for transfers in and out of w/c at baseline, family states that it has been 1-2 weeks since patient has been OOB. Pt. provided complete assist for all bed mobility this date, static sitting EOB 2-3 minutes w/ max A to maintain balance. Pt. presents close to baseline functional independence, anticipate d/c back to facility skilled w/ contnued therapy to address mobility deficits. Pt. does not require skilled IP OT at this time.     Time Calculation:         Time Calculation- OT       Row Name 03/07/25 1405             Time Calculation- OT    OT Start Time 1016  -MP      OT Stop Time 1032  -MP      OT Time Calculation (min) 16 min  -MP      Total Timed Code Minutes- OT 0 minute(s)  -MP      OT Received On 03/07/25  -                User Key  (r) = Recorded By, (t) = Taken By, (c) = Cosigned By      Initials Name Provider Type    MP Av Latham OT Occupational Therapist                  Therapy Charges for Today       Code Description Service Date Service Provider Modifiers Qty    [id number]  OT EVAL LOW COMPLEXITY 3 3/7/2025 Av Latham OT GO 1                 Av Latham OT  3/7/2025

## 2025-03-07 NOTE — PLAN OF CARE
Goal Outcome Evaluation:         Pt. Is improving no longer confused and compliant to work with. TONYA worsening and have been treating her BP with Midodrine. Held her metoprolol because DSB was too low.

## 2025-03-07 NOTE — PROGRESS NOTES
Referring Provider: Juanis Lowe MD    Reason for follow-up: acute CHF, hypotension, a-fib      Patient Care Team:  Kvng Guillen MD as PCP - General (Family Medicine)  Jak Gavin MD as Cardiologist (Cardiology)      SUBJECTIVE    The patient is alert and oriented to person only. She is complaining of pain in her legs. She denies any chest pain or shortness of breath.        ROS  Review of all systems negative except as indicated.    Since I have last seen, the patient has been without any chest discomfort, shortness of breath, palpitations, dizziness or syncope.  Denies having any headache, abdominal pain, nausea, vomiting, diarrhea, constipation, loss of weight or loss of appetite.  Denies having any excessive bruising, hematuria or blood in the stool.        Personal History:    Past Medical History:   Diagnosis Date    Allergic rhinitis 04/14/2015    Asthma 08/10/2017    Atrial flutter 05/11/2017    Chronic diarrhea 04/15/2019    Chronic hypoxemic respiratory failure 01/18/2024    Chronic pain 02/03/2015    COPD (chronic obstructive pulmonary disease)     COVID-19 virus detected 08/11/2022    Cytokine release syndrome, grade 1 08/16/2022    Edema of both lower extremities due to peripheral venous insufficiency 03/12/2021    ESRD on hemodialysis 05/22/2013    Essential (primary) hypertension 03/03/2022    Fracture of ulnar styloid 05/19/2022    Fracture of unspecified carpal bone, right wrist, subsequent encounter for fracture with routine healing 03/03/2022    Gastroesophageal reflux disease 11/12/2020    Gout 08/10/2017    Hearing loss 08/10/2017    History of appendectomy     History of DVT (deep vein thrombosis) 11/12/2020    History of kidney transplant 06/30/2017    History of lobectomy of lung 01/18/2024 03/08/2016:  RIGHT Lower Lobe Mass--> Right Video-assisted thoracoscopy with a moderate-to-large wedge resection of the RLL (by Dr. Haris Mcconnell @ [facility])--> Poorly differentiated  carcinoma of the RLL.      History of repair of hip joint 05/21/2013    History of suicide attempt 05/20/2024    Hyperlipidemia 08/11/2014    Hypothyroidism, unspecified 03/03/2022    Infection due to extended spectrum beta lactamase (ESBL) producing bacteria 03/11/2016    No A2K system hx. +ESBL E coli urine on 3/11/16.      Irritable bowel syndrome 04/15/2019    Long term (current) use of immunosuppressive biologic 04/28/2021    Long term current use of anticoagulant therapy 01/18/2024    Malignant neoplasm of lung 08/10/2017    Menopausal flushing 08/30/2021    Mitral valve regurgitation 07/06/2015    Mixed anxiety and depressive disorder 04/30/2015    Non-small cell lung cancer 08/10/2017    Nonobstructive atherosclerosis of coronary artery 06/02/2019 08/12/2022: CATH: Prashant: NSTEMI assoc with Covid-19: LM:-nl;  LAD: diffuse, Ca++; 30%; CIRC: Dominant. Normal; RCA: small; 50% diffuse, proximal and mid-segment.      NSTEMI (non-ST elevated myocardial infarction) 08/11/2022    Obsessive-compulsive disorder 04/15/2019    Osteoarthritis 05/20/2024    Paroxysmal atrial fibrillation 05/11/2017    Paroxysmal supraventricular tachycardia 05/11/2017    Peripheral neuropathy 08/11/2014    Personal history of peptic ulcer disease 11/12/2020    Postoperative anemia due to acute blood loss 05/22/2013    Right wrist fracture 03/01/2022    Seasonal allergies 08/10/2017    Secondary hyperparathyroidism of renal origin 09/14/2015    Tricuspid valve regurgitation 03/15/2017    Ulcer of lower extremity 08/30/2021    Unspecified acute appendicitis 03/03/2022    Valvular heart disease 05/20/2024    Wegener's granulomatosis with renal involvement 03/03/2022       Past Surgical History:   Procedure Laterality Date    APPENDECTOMY      ARTERIOVENOUS FISTULA/SHUNT SURGERY Right 12/3/2024    Procedure: FISTULOGRAM  and angioplasty RIGHT ARM;  Surgeon: Paulino Alva II, MD;  Location: [facility];  Service: Vascular;   Laterality: Right;    BREAST SURGERY Left     cysts rmeoved    CARDIAC CATHETERIZATION Right 08/12/2022    Procedure: Left Heart Cath and coronary angiogram;  Surgeon: Jak Gavin MD;  Location: [facility] CATH INVASIVE LOCATION;  Service: Cardiology;  Laterality: Right;    CLOSED REDUCTION WRIST FRACTURE Right 03/01/2022    Procedure: WRIST CLOSED REDUCTION;  Surgeon: Gaudencio Townsend MD;  Location: [facility] MAIN OR;  Service: Orthopedics;  Laterality: Right;    CYSTOSCOPY      HIP ARTHROPLASTY      HYSTERECTOMY      LUNG LOBECTOMY Right     TRANSPLANTATION RENAL         Family History   Problem Relation Age of Onset    No Known Problems Mother     No Known Problems Father        Social History     Tobacco Use    Smoking status: Former     Passive exposure: Current    Smokeless tobacco: Never   Vaping Use    Vaping status: Former   Substance Use Topics    Alcohol use: Never    Drug use: Never        Home meds:  Prior to Admission medications    Medication Sig Start Date End Date Taking? Authorizing Provider   amoxicillin-clavulanate (AUGMENTIN) 875-125 MG per tablet Take 1 tablet by mouth 2 (Two) Times a Day. 3/1/25  Yes Lorenzo Morales MD   apixaban (ELIQUIS) 5 MG tablet tablet Take 1 tablet by mouth Every 12 (Twelve) Hours. 8/16/22  Yes Clyde White DO   DULoxetine HCl 40 MG capsule delayed-release particles Take 1 capsule by mouth Daily. Indications: Major Depressive Disorder 7/11/24  Yes Court Vences MD   ferrous sulfate 325 (65 FE) MG tablet Take 1 tablet by mouth 3 times a day.   Yes Court Vences MD   furosemide (LASIX) 40 MG tablet Take 1 tablet by mouth 2 (Two) Times a Day.   Yes Court Vences MD   hydrOXYzine (ATARAX) 25 MG tablet Take 1 tablet by mouth Every Night.   Yes Court Vences MD   LORazepam (ATIVAN) 0.5 MG tablet Take 1 tablet by mouth Every 6 (Six) Hours.   Yes Court Vences MD   metoprolol tartrate (LOPRESSOR) 25 MG tablet Take 0.5 tablets by mouth 2  (Two) Times a Day.   Yes Court Vences MD   midodrine (PROAMATINE) 5 MG tablet Take 3 tablets by mouth 3 (Three) Times a Day Before Meals.   Yes Court Vences MD   mirtazapine (REMERON) 15 MG tablet Take 1 tablet by mouth Every Night.   Yes Court Vences MD   polyethylene glycol (MiraLax) 17 GM/SCOOP powder Take 17 g by mouth Daily.   Yes Court Vences MD   predniSONE (DELTASONE) 5 MG tablet Take 1 tablet by mouth Daily. Indications: ACUTE EXACERBATION OF COPD (INACTIVE) 7/11/24  Yes Court Vences MD   sacubitril-valsartan (Entresto) 24-26 MG tablet Take 1 tablet by mouth 2 (Two) Times a Day.   Yes Court Vences MD   sennosides-docusate (PERICOLACE) 8.6-50 MG per tablet Take 1 tablet by mouth 2 (Two) Times a Day.   Yes Court Vences MD   simvastatin (ZOCOR) 20 MG tablet Take 1 tablet by mouth Every Night.   Yes Court Vences MD   tacrolimus (PROGRAF) 0.5 MG capsule Take 1 capsule by mouth 2 (Two) Times a Day. 6/13/24  Yes Janene Archer APRN   vitamin B-12 (CYANOCOBALAMIN) 1000 MCG tablet Take 1 tablet by mouth Daily.   Yes Court Vences MD   vitamin D (ERGOCALCIFEROL) 1.25 MG (09495 UT) capsule capsule Take 1 capsule by mouth 1 (One) Time Per Week.   Yes Court Vences MD   acetaminophen (Tylenol) 325 MG tablet Take 2 tablets by mouth Every 4 (Four) Hours As Needed for Fever or Mild Pain. Indications: Pain 3/13/20   Court Vences MD   albuterol sulfate  (90 Base) MCG/ACT inhaler Inhale 2 puffs Every 6 (Six) Hours As Needed for Wheezing.    Court Vences MD   HYDROcodone-acetaminophen (NORCO)  MG per tablet Take 1 tablet by mouth Every 6 (Six) Hours.    Court Vences MD       Allergies:  Zolpidem    Scheduled Meds:apixaban, 5 mg, Oral, Q12H  cefTRIAXone, 1,000 mg, Intravenous, Q24H  DULoxetine, 40 mg, Oral, Daily  HYDROcodone-acetaminophen, 1 tablet, Oral, Q6H  levothyroxine, 137 mcg, Oral, Q  "AM  metoprolol tartrate, 12.5 mg, Oral, Q12H  midodrine, 15 mg, Oral, Q8H  mirtazapine, 15 mg, Oral, Nightly  mupirocin, 1 Application, Each Nare, BID  polyethylene glycol, 17 g, Oral, Daily  [Held by provider] sacubitril-valsartan, 1 tablet, Oral, Q12H  sennosides-docusate, 1 tablet, Oral, BID  sodium chloride, 10 mL, Intravenous, Q12H  sodium chloride, 10 mL, Intravenous, Q12H  tacrolimus, 0.5 mg, Oral, BID  vitamin B-12, 1,000 mcg, Oral, Daily      Continuous Infusions:   PRN Meds:.  acetaminophen **OR** acetaminophen    albuterol    aluminum-magnesium hydroxide-simethicone    senna-docusate sodium **AND** polyethylene glycol **AND** bisacodyl **AND** bisacodyl    hydrOXYzine    [Held by provider] LORazepam    nitroglycerin    ondansetron ODT **OR** ondansetron    prochlorperazine    [COMPLETED] Insert Peripheral IV **AND** sodium chloride    sodium chloride    sodium chloride    sodium chloride    sodium chloride    sodium chloride      OBJECTIVE    Vital Signs  Vitals:    03/07/25 0430 03/07/25 0500 03/07/25 0530 03/07/25 0600   BP:  116/47  126/64   BP Location:       Patient Position:       Pulse: 101 102 98 100   Resp:       Temp:       TempSrc:       SpO2: (!) 74% (!) 82% (!) 86% 91%   Weight:       Height:           Flowsheet Rows      Flowsheet Row First Filed Value   Admission Height 167.7 cm (66.02\") Documented at 03/03/2025 1641   Admission Weight 81.6 kg (179 lb 14.3 oz) Documented at 03/03/2025 1641              Intake/Output Summary (Last 24 hours) at 3/7/2025 0728  Last data filed at 3/7/2025 0415  Gross per 24 hour   Intake --   Output 550 ml   Net -550 ml          Telemetry:  atrial fibrillation, heart rate 100s-110s    Physical Exam:  The patient is alert and in no distress. She is frail and appears chronically ill.   Vital signs as noted above.  Head and neck revealed no carotid bruits or jugular venous distention.   Lungs with scattered rhonchi and diminished bases.  No wheezing.  On oxygen at " 4 liters per high flow nasal cannula.   Heart normal first and second heart sounds.  No murmur. No precordial rub is present.  No gallop is present.  Abdomen soft and nontender.    Extremities with good peripheral pulses with 1+ edema in BLE.  Skin warm and dry.  Musculoskeletal system is grossly normal.  CNS:  she is oriented to person only      Results Review:  I have personally reviewed the results from the time of this admission to 3/7/2025 07:28 EST and agree with these findings:  [x]  Laboratory  [x]  Microbiology  [x]  Radiology  [x]  EKG/Telemetry   [x]  Cardiology/Vascular   []  Pathology  [x]  Old records  []  Other:    Most notable findings include:    Lab Results (last 24 hours)       Procedure Component Value Units Date/Time    Magnesium [[id number]]  (Normal) Collected: 03/07/25 0541    Specimen: Blood Updated: 03/07/25 0650     Magnesium 2.0 mg/dL     Comprehensive Metabolic Panel [[id number]]  (Abnormal) Collected: 03/07/25 0541    Specimen: Blood Updated: 03/07/25 0650     Glucose 83 mg/dL      BUN 50 mg/dL      Creatinine 1.41 mg/dL      Sodium 136 mmol/L      Potassium 4.7 mmol/L      Comment: Slight hemolysis detected by analyzer. Result may be falsely elevated.        Chloride 97 mmol/L      CO2 28.0 mmol/L      Calcium 9.7 mg/dL      Total Protein 6.0 g/dL      Albumin 3.1 g/dL      ALT (SGPT) <5 U/L      AST (SGOT) 13 U/L      Alkaline Phosphatase 72 U/L      Total Bilirubin 0.5 mg/dL      Globulin 2.9 gm/dL      A/G Ratio 1.1 g/dL      BUN/Creatinine Ratio 35.5     Anion Gap 11.0 mmol/L      eGFR 38.3 mL/min/1.73     Narrative:      GFR Categories in Chronic Kidney Disease (CKD)      GFR Category          GFR (mL/min/1.73)    Interpretation  G1                     90 or greater         Normal or high (1)  G2                      60-89                Mild decrease (1)  G3a                   45-59                Mild to moderate decrease  G3b                   30-44                Moderate to  severe decrease  G4                    15-29                Severe decrease  G5                    14 or less           Kidney failure          (1)In the absence of evidence of kidney disease, neither GFR category G1 or G2 fulfill the criteria for CKD.    eGFR calculation 2021 CKD-EPI creatinine equation, which does not include race as a factor    Phosphorus [[id number]]  (Normal) Collected: 03/07/25 0541    Specimen: Blood Updated: 03/07/25 0623     Phosphorus 3.3 mg/dL     CBC & Differential [[id number]]  (Abnormal) Collected: 03/07/25 0541    Specimen: Blood Updated: 03/07/25 0550    Narrative:      The following orders were created for panel order CBC & Differential.  Procedure                               Abnormality         Status                     ---------                               -----------         ------                     CBC Auto Differential[[id number]]        Abnormal            Final result                 Please view results for these tests on the individual orders.    CBC Auto Differential [[id number]]  (Abnormal) Collected: 03/07/25 0541    Specimen: Blood Updated: 03/07/25 0550     WBC 8.45 10*3/mm3      RBC 2.81 10*6/mm3      Hemoglobin 8.4 g/dL      Hematocrit 28.7 %      .1 fL      MCH 29.9 pg      MCHC 29.3 g/dL      RDW 18.2 %      RDW-SD 67.2 fl      MPV 9.2 fL      Platelets 161 10*3/mm3      Neutrophil % 66.6 %      Lymphocyte % 16.3 %      Monocyte % 10.2 %      Eosinophil % 6.5 %      Basophil % 0.2 %      Immature Grans % 0.2 %      Neutrophils, Absolute 5.62 10*3/mm3      Lymphocytes, Absolute 1.38 10*3/mm3      Monocytes, Absolute 0.86 10*3/mm3      Eosinophils, Absolute 0.55 10*3/mm3      Basophils, Absolute 0.02 10*3/mm3      Immature Grans, Absolute 0.02 10*3/mm3      nRBC 0.0 /100 WBC     Blood Culture - Blood, Arm, Left [[id number]]  (Normal) Collected: 03/03/25 1716    Specimen: Blood from Arm, Left Updated: 03/06/25 0085     Blood Culture No growth at 3 days     Blood Culture - Blood, Arm, Left [[id number]]  (Normal) Collected: 03/03/25 1716    Specimen: Blood from Arm, Left Updated: 03/06/25 1731     Blood Culture No growth at 3 days    Narrative:      Less than seven (7) mL's of blood was collected.  Insufficient quantity may yield false negative results.    Magnesium [[id number]]  (Normal) Collected: 03/06/25 0907    Specimen: Blood from Arm, Left Updated: 03/06/25 1012     Magnesium 2.0 mg/dL     Comprehensive Metabolic Panel [[id number]]  (Abnormal) Collected: 03/06/25 0907    Specimen: Blood from Arm, Left Updated: 03/06/25 1012     Glucose 96 mg/dL      BUN 51 mg/dL      Creatinine 1.75 mg/dL      Sodium 136 mmol/L      Potassium 4.9 mmol/L      Chloride 98 mmol/L      CO2 30.6 mmol/L      Calcium 9.8 mg/dL      Total Protein 6.1 g/dL      Albumin 3.2 g/dL      ALT (SGPT) <5 U/L      AST (SGOT) 15 U/L      Alkaline Phosphatase 75 U/L      Total Bilirubin 0.5 mg/dL      Globulin 2.9 gm/dL      A/G Ratio 1.1 g/dL      BUN/Creatinine Ratio 29.1     Anion Gap 7.4 mmol/L      eGFR 29.5 mL/min/1.73     Narrative:      GFR Categories in Chronic Kidney Disease (CKD)      GFR Category          GFR (mL/min/1.73)    Interpretation  G1                     90 or greater         Normal or high (1)  G2                      60-89                Mild decrease (1)  G3a                   45-59                Mild to moderate decrease  G3b                   30-44                Moderate to severe decrease  G4                    15-29                Severe decrease  G5                    14 or less           Kidney failure          (1)In the absence of evidence of kidney disease, neither GFR category G1 or G2 fulfill the criteria for CKD.    eGFR calculation 2021 CKD-EPI creatinine equation, which does not include race as a factor    Phosphorus [[id number]]  (Normal) Collected: 03/06/25 0907    Specimen: Blood from Arm, Left Updated: 03/06/25 0940     Phosphorus 3.3 mg/dL     CBC &  Differential [[id number]]  (Abnormal) Collected: 03/06/25 0907    Specimen: Blood from Arm, Left Updated: 03/06/25 0914    Narrative:      The following orders were created for panel order CBC & Differential.  Procedure                               Abnormality         Status                     ---------                               -----------         ------                     CBC Auto Differential[[id number]]        Abnormal            Final result                 Please view results for these tests on the individual orders.    CBC Auto Differential [[id number]]  (Abnormal) Collected: 03/06/25 0907    Specimen: Blood from Arm, Left Updated: 03/06/25 0914     WBC 7.20 10*3/mm3      RBC 2.88 10*6/mm3      Hemoglobin 8.6 g/dL      Hematocrit 29.6 %      .8 fL      MCH 29.9 pg      MCHC 29.1 g/dL      RDW 18.0 %      RDW-SD 64.0 fl      MPV 9.1 fL      Platelets 167 10*3/mm3      Neutrophil % 67.5 %      Lymphocyte % 14.6 %      Monocyte % 9.7 %      Eosinophil % 7.6 %      Basophil % 0.3 %      Immature Grans % 0.3 %      Neutrophils, Absolute 4.86 10*3/mm3      Lymphocytes, Absolute 1.05 10*3/mm3      Monocytes, Absolute 0.70 10*3/mm3      Eosinophils, Absolute 0.55 10*3/mm3      Basophils, Absolute 0.02 10*3/mm3      Immature Grans, Absolute 0.02 10*3/mm3      nRBC 0.0 /100 WBC             Imaging Results (Last 24 Hours)       ** No results found for the last 24 hours. **            LAB RESULTS (LAST 7 DAYS)    CBC  Results from last 7 days   Lab Units 03/07/25  0541 03/06/25  0907 03/05/25  0744 03/04/25  0231 03/04/25  0124 03/03/25  1712 03/01/25  1002   WBC 10*3/mm3 8.45 7.20 8.11  --  8.18 9.73 9.95   RBC 10*6/mm3 2.81* 2.88* 2.73*  --  3.05* 3.17* 3.12*   HEMOGLOBIN g/dL 8.4* 8.6* 8.1*  --  9.1* 9.4* 9.1*   HEMOGLOBIN, POC g/dL  --   --   --  10.0*  --   --   --    HEMATOCRIT % 28.7* 29.6* 28.1*  --  31.5* 32.8* 32.2*   HEMATOCRIT POC %  --   --   --  29*  --   --   --    MCV fL 102.1* 102.8* 102.9*   --  103.3* 103.5* 103.2*   PLATELETS 10*3/mm3 161 167 168  --  204 234 185       BMP  Results from last 7 days   Lab Units 03/07/25  0541 03/06/25  0907 03/05/25  0744 03/04/25  0549 03/04/25  0307 03/04/25  0231 03/04/25  0124 03/03/25 1712 03/01/25  1002   SODIUM mmol/L 136 136 132* 140 143  --   --  135* 137   POTASSIUM mmol/L 4.7 4.9 5.1 4.9 4.8  --   --  5.3* 5.0   CHLORIDE mmol/L 97* 98 97* 103 104  --   --  97* 99   CO2 mmol/L 28.0 30.6* 26.8 28.3 29.9*  --   --  30.8* 30.8*   BUN mg/dL 50* 51* 51* 48* 48*  --   --  48* 37*   CREATININE mg/dL 1.41* 1.75* 1.91* 1.84* 1.87* 2.07*  --  2.01* 1.29*   GLUCOSE mg/dL 83 96 99 120* 128*  --   --  134* 114*   MAGNESIUM mg/dL 2.0 2.0 2.0 2.0 2.0  --   --   --   --    PHOSPHORUS mg/dL 3.3 3.3 3.3  --   --   --  4.6*  --   --        CMP   Results from last 7 days   Lab Units 03/07/25  0541 03/06/25  0907 03/05/25  0744 03/04/25  0549 03/04/25 0307 03/04/25 0231 03/04/25 0124 03/03/25 1712 03/01/25  1002   SODIUM mmol/L 136 136 132* 140 143  --   --  135* 137   POTASSIUM mmol/L 4.7 4.9 5.1 4.9 4.8  --   --  5.3* 5.0   CHLORIDE mmol/L 97* 98 97* 103 104  --   --  97* 99   CO2 mmol/L 28.0 30.6* 26.8 28.3 29.9*  --   --  30.8* 30.8*   BUN mg/dL 50* 51* 51* 48* 48*  --   --  48* 37*   CREATININE mg/dL 1.41* 1.75* 1.91* 1.84* 1.87* 2.07*  --  2.01* 1.29*   GLUCOSE mg/dL 83 96 99 120* 128*  --   --  134* 114*   ALBUMIN g/dL 3.1* 3.2* 3.1* 3.7  --   --   --  3.1*  --    BILIRUBIN mg/dL 0.5 0.5 0.4 0.4  --   --   --  0.4  --    ALK PHOS U/L 72 75 69 67  --   --   --  85  --    AST (SGOT) U/L 13 15 13 18  --   --   --  15  --    ALT (SGPT) U/L <5 <5 <5 5  --   --   --  5  --    AMMONIA umol/L  --   --   --   --   --   --  22  --   --        BNP        TROPONIN  Results from last 7 days   Lab Units 03/03/25  3509   HSTROP T ng/L 49*       CoAg        Creatinine Clearance  Estimated Creatinine Clearance: 34.8 mL/min (A) (by C-G formula based on SCr of 1.41 mg/dL  (H)).    ABG  Results from last 7 days   Lab Units 03/04/25  0231   PH, ARTERIAL pH units 7.291*   PCO2, ARTERIAL mm Hg 69.0*   PO2 ART mm Hg 82.0*   O2 SATURATION ART % 94.0   BASE EXCESS ART mmol/L 5.2*       Radiology  No radiology results for the last day      EKG  I personally viewed and interpreted the patient's EKG/Telemetry data:  ECG 12 Lead Altered Mental Status   Final Result   HEART RATE=96  bpm   RR Jiabjmou=853  ms   SC Interval=  ms   P Horizontal Axis=  deg   P Front Axis=  deg   QRSD Interval=80  ms   QT Siotmuhb=950  ms   GHrC=640  ms   QRS Axis=-39  deg   T Wave Axis=90  deg   - ABNORMAL ECG -   Atrial fibrillation   Ventricular premature complex   Left axis deviation   Anteroseptal  infarct, age indeterminate   When compared with ECG of 01-Mar-2025 09:40:43,   Significant axis, voltage or hypertrophy change   Electronically Signed By: Paulino Miller (TWILA) 2025-03-04 07:28:16   Date and Time of Study:2025-03-03 16:45:51      Telemetry Scan   Final Result      Telemetry Scan   Final Result            Echocardiogram:    Results for orders placed during the hospital encounter of 11/29/24    Adult Transthoracic Echo Complete W/ Cont if Necessary Per Protocol    Interpretation Summary    Left ventricular systolic function is normal. Calculated left ventricular EF = 70% Left ventricular ejection fraction appears to be 66 - 70%.    Left ventricular wall thickness is consistent with moderate to severe concentric hypertrophy.    Left ventricular diastolic function is consistent with (grade III w/high LAP) reversible restrictive pattern.    Moderately reduced right ventricular systolic function noted.    The right ventricular cavity is severely dilated.    The left atrial cavity is severely dilated.    The right atrial cavity is severely  dilated.    There is mild calcification of the aortic valve mainly affecting the left coronary and right coronary cusp(s).    Severe tricuspid valve regurgitation is  present.    Estimated right ventricular systolic pressure from tricuspid regurgitation is markedly elevated (>55 mmHg).    Severe pulmonary hypertension is present.    There is a moderate sized left pleural effusion.        Stress Test:         Cardiac Catheterization:  Results for orders placed during the hospital encounter of 08/11/22    Cardiac Catheterization/Vascular Study    Conclusion  IMPRESSIONS  Non obstructive CAD         Other:         ASSESSMENT & PLAN:    Principal Problem:    UTI (urinary tract infection)  Active Problems:    COPD (chronic obstructive pulmonary disease)    Hypothyroidism, unspecified    History of kidney transplant    Long term (current) use of immunosuppressive biologic    Long term current use of anticoagulant therapy    History of DVT (deep vein thrombosis)    Pulmonary hypertension    Decubitus ulcer of foot, stage 3    Edema of both lower extremities due to peripheral venous insufficiency    Mixed anxiety and depressive disorder    Essential (primary) hypertension    Paroxysmal atrial fibrillation    Acute exacerbation of CHF (congestive heart failure)    End stage renal disease    Edema of right upper arm    TONYA (acute kidney injury)    Pressure injury of right heel, stage 3      Atrial fibrillation  Heart rate 100s-110s  I will switch metoprolol tartrate to succinate 12.5 mg daily  [id number] score is 5  Continue Eliquis      Acute on chronic HFpEF / Pulmonary hypertension  Echocardiogram shows preserved LV function with severe tricuspid valve regurgitation and severe pulmonary hypertension.  Presented with proBNP of 17,000 (compared to 4800 during previous admission)  Diuretics per nephrology  Unable to add GDMT due to renal dysfunction and hypotension requiring midodrine  Strict I&Os and daily weight     Hypotension  Currently on midodrine 15 mg every 8 hours  Entresto is on hold     History of kidney transplant / TONYA on CKD  AV fistula in place, but no on hemodialysis   On  tacrolimus  Creatinine down to 1.41 today  Nephrology following      Right IJ DVT  Diagnosed in December 2024  Repeat venous duplex is negative for DVT  VQ scan with low probability of PE  Continue Eliquis     Anemia in CKD   Hemoglobin is stable at 8.4  Monitor H&H  Transfuse as needed     COPD  History of lung cancer  Currently on 4 L of oxygen  Albuterol PRN     Anxiety/depression  Currently on duloxetine and mirtazapine       Electronically signed by TONYA Stone, 03/07/25, 2:26 PM EST.

## 2025-03-07 NOTE — PLAN OF CARE
Goal Outcome Evaluation:  Plan of Care Reviewed With: patient        Progress: no change  Outcome Evaluation: Pt. deng  is a 80 y.o. female with atrial fibrillation, pulmonary hypertension, HFpEF, COPD, history of kidney transplant, DVT who presents to the hospital from [email or web address] where she has been staying since Nov 2024. Pt. states she requires max ADL support for dressing/bathing, is able to feed self and participate in grooming activites. Staff A x 2 for transfers in and out of w/c at baseline, family states that it has been 1-2 weeks since patient has been OOB. Pt. provided complete assist for all bed mobility this date, static sitting EOB 2-3 minutes w/ max A to maintain balance. Pt. presents close to baseline functional independence, anticipate d/c back to facility skilled w/ contnued therapy to address mobility deficits. Pt. does not require skilled IP OT at this time.    Anticipated Discharge Disposition (OT): skilled nursing facility

## 2025-03-07 NOTE — THERAPY EVALUATION
Patient Name: Jaja Carcamo  : 1945    MRN: [id number]                              Today's Date: 3/7/2025       Admit Date: 3/3/2025    Visit Dx:     ICD-10-CM ICD-9-CM   1. TONYA (acute kidney injury)  N17.9 584.9   2. Acute UTI  N39.0 599.0   3. Hypervolemia, unspecified hypervolemia type  E87.70 276.69     Patient Active Problem List   Diagnosis    COPD (chronic obstructive pulmonary disease)    Acute UTI    Atrial fibrillation with rapid ventricular response    Volume overload    Fall    Hypothyroidism, unspecified    Hyperlipidemia    History of suicide attempt    History of lobectomy of lung    Irritable bowel syndrome    History of kidney transplant    Long term (current) use of immunosuppressive biologic    Long term current use of anticoagulant therapy    Menopausal flushing    Mitral valve regurgitation    Tricuspid valve regurgitation    History of lung cancer    Nonobstructive atherosclerosis of coronary artery    Obsessive-compulsive disorder    Osteoarthritis of left hip    Primary osteoarthritis of right hip    Paroxysmal supraventricular tachycardia    Peripheral neuropathy    History of DVT (deep vein thrombosis)    Personal history of peptic ulcer disease    Pulmonary hypertension    Seasonal allergies    Decubitus ulcer of foot, stage 3    History of repair of hip joint    Gout    Gastroesophageal reflux disease    Hearing loss    Edema of both lower extremities due to peripheral venous insufficiency    Mixed anxiety and depressive disorder    Chronic pain    Chronic hypoxemic respiratory failure    Chronic diarrhea    Asthma    Osteoarthritis    Allergic rhinitis    Wegener's granulomatosis with renal involvement    Essential (primary) hypertension    Paroxysmal atrial fibrillation    Valvular heart disease    Acute exacerbation of CHF (congestive heart failure)    UTI (urinary tract infection)    C. difficile colitis    Acute deep vein thrombosis (DVT) of other vein of right upper  extremity    Acute DVT (deep venous thrombosis)    End stage renal disease    Edema of right upper arm    TONYA (acute kidney injury)    Pressure injury of right heel, stage 3     Past Medical History:   Diagnosis Date    Allergic rhinitis 04/14/2015    Asthma 08/10/2017    Atrial flutter 05/11/2017    Chronic diarrhea 04/15/2019    Chronic hypoxemic respiratory failure 01/18/2024    Chronic pain 02/03/2015    COPD (chronic obstructive pulmonary disease)     COVID-19 virus detected 08/11/2022    Cytokine release syndrome, grade 1 08/16/2022    Edema of both lower extremities due to peripheral venous insufficiency 03/12/2021    ESRD on hemodialysis 05/22/2013    Essential (primary) hypertension 03/03/2022    Fracture of ulnar styloid 05/19/2022    Fracture of unspecified carpal bone, right wrist, subsequent encounter for fracture with routine healing 03/03/2022    Gastroesophageal reflux disease 11/12/2020    Gout 08/10/2017    Hearing loss 08/10/2017    History of appendectomy     History of DVT (deep vein thrombosis) 11/12/2020    History of kidney transplant 06/30/2017    History of lobectomy of lung 01/18/2024 03/08/2016:  RIGHT Lower Lobe Mass--> Right Video-assisted thoracoscopy with a moderate-to-large wedge resection of the RLL (by Dr. Haris Mcconnell @ [facility])--> Poorly differentiated carcinoma of the RLL.      History of repair of hip joint 05/21/2013    History of suicide attempt 05/20/2024    Hyperlipidemia 08/11/2014    Hypothyroidism, unspecified 03/03/2022    Infection due to extended spectrum beta lactamase (ESBL) producing bacteria 03/11/2016    No A2K system hx. +ESBL E coli urine on 3/11/16.      Irritable bowel syndrome 04/15/2019    Long term (current) use of immunosuppressive biologic 04/28/2021    Long term current use of anticoagulant therapy 01/18/2024    Malignant neoplasm of lung 08/10/2017    Menopausal flushing 08/30/2021    Mitral valve regurgitation 07/06/2015    Mixed anxiety  and depressive disorder 04/30/2015    Non-small cell lung cancer 08/10/2017    Nonobstructive atherosclerosis of coronary artery 06/02/2019 08/12/2022: CATH: [facility]: NSTEMI assoc with Covid-19: LM:-nl;  LAD: diffuse, Ca++; 30%; CIRC: Dominant. Normal; RCA: small; 50% diffuse, proximal and mid-segment.      NSTEMI (non-ST elevated myocardial infarction) 08/11/2022    Obsessive-compulsive disorder 04/15/2019    Osteoarthritis 05/20/2024    Paroxysmal atrial fibrillation 05/11/2017    Paroxysmal supraventricular tachycardia 05/11/2017    Peripheral neuropathy 08/11/2014    Personal history of peptic ulcer disease 11/12/2020    Postoperative anemia due to acute blood loss 05/22/2013    Right wrist fracture 03/01/2022    Seasonal allergies 08/10/2017    Secondary hyperparathyroidism of renal origin 09/14/2015    Tricuspid valve regurgitation 03/15/2017    Ulcer of lower extremity 08/30/2021    Unspecified acute appendicitis 03/03/2022    Valvular heart disease 05/20/2024    Wegener's granulomatosis with renal involvement 03/03/2022     Past Surgical History:   Procedure Laterality Date    APPENDECTOMY      ARTERIOVENOUS FISTULA/SHUNT SURGERY Right 12/3/2024    Procedure: FISTULOGRAM  and angioplasty RIGHT ARM;  Surgeon: Paulino Alva II, MD;  Location: [facility] HYBRID OR;  Service: Vascular;  Laterality: Right;    BREAST SURGERY Left     cysts rmeoved    CARDIAC CATHETERIZATION Right 08/12/2022    Procedure: Left Heart Cath and coronary angiogram;  Surgeon: Jak Gavin MD;  Location: [facility] CATH INVASIVE LOCATION;  Service: Cardiology;  Laterality: Right;    CLOSED REDUCTION WRIST FRACTURE Right 03/01/2022    Procedure: WRIST CLOSED REDUCTION;  Surgeon: Gaudencio Townsend MD;  Location: [facility] MAIN OR;  Service: Orthopedics;  Laterality: Right;    CYSTOSCOPY      HIP ARTHROPLASTY      HYSTERECTOMY      LUNG LOBECTOMY Right     TRANSPLANTATION RENAL        General Information       Row Name 03/07/25 1021           Physical Therapy Time and Intention    Document Type evaluation  -RR     Mode of Treatment physical therapy  -RR       Row Name 03/07/25 1020          General Information    Patient Profile Reviewed yes  -RR     Prior Level of Function --  dependent for ADLs and transfers at facility bed/wheelchair level; has been in facility since NOV 2024  -RR     Existing Precautions/Restrictions fall  4L oxygen, skin integrity  -RR     Barriers to Rehab medically complex;previous functional deficit  -RR       Row Name 03/07/25 1020          Living Environment    People in Home facility resident  -RR       Row Name 03/07/25 1020          Home Main Entrance    Number of Stairs, Main Entrance none  -RR       Row Name 03/07/25 1020          Stairs Within Home, Primary    Number of Stairs, Within Home, Primary none  -RR       Row Name 03/07/25 1020          Cognition    Orientation Status (Cognition) oriented x 4  -RR       Row Name 03/07/25 1020          Safety Issues/Impairments Affecting Functional Mobility    Impairments Affecting Function (Mobility) balance;endurance/activity tolerance  -RR               User Key  (r) = Recorded By, (t) = Taken By, (c) = Cosigned By      Initials Name Provider Type    RR Aiyana Mendieta, PT Physical Therapist                   Mobility       Row Name 03/07/25 1241          Bed Mobility    Bed Mobility bed mobility (all) activities  -RR     All Activities, [place] (Bed Mobility) dependent (less than 25% patient effort)  -RR     Comment, (Bed Mobility) rolling, positioning, and supine<>sit  -RR       Row Name 03/07/25 1241          Bed-Chair Transfer    Bed-Chair [place] (Transfers) not tested  -RR       Row Name 03/07/25 1241          Sit-Stand Transfer    Sit-Stand [place] (Transfers) not tested  -RR       Row Name 03/07/25 1241          Gait/Stairs (Locomotion)    [place] Level (Gait) not tested  -RR     Patient was able to Ambulate no, other medical factors prevent  ambulation  -RR     Reason Patient was unable to Ambulate Non-Ambulatory at Baseline  -RR     [place] Level (Stairs) not tested  -RR               User Key  (r) = Recorded By, (t) = Taken By, (c) = Cosigned By      Initials Name Provider Type    Aiyana Duran PT Physical Therapist                   Obj/Interventions       Row Name 03/07/25 1243          Range of Motion Comprehensive    General Range of Motion bilateral lower extremity ROM WFL  -RR       Row Name 03/07/25 1243          Strength Comprehensive (MMT)    Comment, General Manual Muscle Testing (MMT) Assessment BLE grossly 3-/5  -RR       Row Name 03/07/25 1244          Balance    Balance Assessment sitting static balance;sitting dynamic balance  -RR     Static Sitting Balance moderate assist  -RR     Dynamic Sitting Balance moderate assist  -RR     Position, Sitting Balance supported  -RR       Row Name 03/07/25 1244          Sensory Assessment (Somatosensory)    Sensory Assessment (Somatosensory) --  able to detect light touch  -RR               User Key  (r) = Recorded By, (t) = Taken By, (c) = Cosigned By      Initials Name Provider Type    Aiyana Duran PT Physical Therapist                   Goals/Plan       Row Name 03/07/25 1252          Bed Mobility Goal 1 (PT)    Activity/Assistive Device (Bed Mobility Goal 1, PT) bed mobility activities, all  -RR     [place] Level/Cues Needed (Bed Mobility Goal 1, PT) minimum assist (75% or more patient effort)  -RR     Time Frame (Bed Mobility Goal 1, PT) long term goal (LTG);2 weeks  -RR       Row Name 03/07/25 1252          Transfer Goal 1 (PT)    Activity/Assistive Device (Transfer Goal 1, PT) transfers, all  -RR     [place] Level/Cues Needed (Transfer Goal 1, PT) minimum assist (75% or more patient effort)  -RR     Time Frame (Transfer Goal 1, PT) 2 weeks;long term goal (LTG)  -RR       Row Name 03/07/25 1252          Balance Goal 1 (PT)    Activity/Assistive Device (Balance  Goal) sitting dynamic balance  -RR     [place] Level/Cues Needed (Balance Goal 1, PT) contact guard required  -RR     Time Frame (Balance Goal 1, PT) long-term goal (LTG);2 weeks  -RR       Row Name 03/07/25 1252          Therapy Assessment/Plan (PT)    Planned Therapy Interventions (PT) balance training;bed mobility training;gait training;home exercise program;neuromuscular re-education;patient/family education;postural re-education;stair training;strengthening;transfer training;ROM (range of motion);wheelchair management/propulsion training  -RR               User Key  (r) = Recorded By, (t) = Taken By, (c) = Cosigned By      Initials Name Provider Type    RR Aiyana Mendieta, PT Physical Therapist                   Clinical Impression       Row Name 03/07/25 1245          Pain    Pretreatment Pain Rating 5/10  -RR     Posttreatment Pain Rating 5/10  -RR     Pain Side/Orientation --  BLE below knees  -RR       Row Name 03/07/25 1244          Plan of Care Review    Plan of Care Reviewed With patient  -RR     Outcome Evaluation Jaja Carcamo is a 80 y.o. female  with atrial fibrillation, pulmonary hypertension, HFpEF, COPD, history of kidney transplant, DVT who presents to the hospital from [facility] where she has been staying since Nov 2024.  She is cleared for therapy by nursing and she is found resting in bed alert and oriented x3-4, somewhat [place] and a poor historian.  Patient is agreeable to activity.  Since Nov 2024 patient has been requiring assistance x2 for functional transfers and ADLs; she uses a wheelchair for primary mobility; non-ambulatory.  Most recently, for the past two weeks, she has not been out of bed at facility.  Upon evaluation, she requires dependent assistance for bed mobility and sitting EOB; she demos low activity tolerance and tolerance to upright; tho vitals are stable.  Recommend return to [facility] level of care for skilled PT to address balance, LE  strengthening, functional transfers, and progressive mobilty training to tolerance.  -RR       Row Name 03/07/25 1245          Therapy Assessment/Plan (PT)    Patient/Family Therapy Goals Statement (PT) return to SNF  -RR     Rehab Potential (PT) fair  -RR     Criteria for Skilled Interventions Met (PT) yes;meets criteria;skilled treatment is necessary  -RR     Therapy Frequency (PT) 3 times/wk  -RR     Predicted Duration of Therapy Intervention (PT) dc  -RR       Row Name 03/07/25 1025          Vital Signs    Pre Systolic BP Rehab 101  -RR     Pre Treatment Diastolic BP 55  -RR     Intra Systolic BP Rehab 92  -RR     Intra Treatment Diastolic BP 62  -RR     Pretreatment Heart Rate (beats/min) 116  -RR     Posttreatment Heart Rate (beats/min) 92  -RR     Pre SpO2 (%) 93  -RR     O2 Delivery Pre Treatment supplemental O2  4L  -RR       Row Name 03/07/25 1245          Positioning and Restraints    Post Treatment Position bed  -RR     In Bed exit alarm on;call light within reach  -RR               User Key  (r) = Recorded By, (t) = Taken By, (c) = Cosigned By      Initials Name Provider Type    RR Aiyana Mendieta, PT Physical Therapist                   Outcome Measures       Row Name 03/07/25 1253 03/07/25 0415       How much help from another person do you currently need...    Turning from your back to your side while in flat bed without using bedrails? 1  -RR 1  -LK (r) GH (t) LK (c)    Moving from lying on back to sitting on the side of a flat bed without bedrails? 1  -RR 1  -LK (r) GH (t) LK (c)    Moving to and from a bed to a chair (including a wheelchair)? 1  -RR 1  -LK (r) GH (t) LK (c)    Standing up from a chair using your arms (e.g., wheelchair, bedside chair)? 1  -RR 1  -LK (r) GH (t) LK (c)    Climbing 3-5 steps with a railing? 1  -RR 1  -LK (r) GH (t) LK (c)    To walk in hospital room? 1  -RR 1  -LK (r) GH (t) LK (c)    AM-PAC 6 Clicks Score (PT) 6  -RR 6  -GH              User Key  (r) = Recorded By,  (t) = Taken By, (c) = Cosigned By      Initials Name Provider Type    Valeria Lira, RN Registered Nurse    Hayden Hopson RNA Registered Nurse    Aiyana Duran, PT Physical Therapist                                 Physical Therapy Education       Title: PT OT SLP Therapies (Done)       Topic: Physical Therapy (Done)       Point: Mobility training (Done)       Learning Progress Summary            Patient Acceptance, E,TB, VU by RR at 3/7/2025 1253                      Point: Home exercise program (Done)       Learning Progress Summary            Patient Acceptance, E,TB, VU by RR at 3/7/2025 1253                                      User Key       Initials Effective Dates Name Provider Type Discipline     07/01/24 -  Aiyana Mendieta PT Physical Therapist PT                  PT Recommendation and Plan  Planned Therapy Interventions (PT): balance training, bed mobility training, gait training, home exercise program, neuromuscular re-education, patient/family education, postural re-education, stair training, strengthening, transfer training, ROM (range of motion), wheelchair management/propulsion training  Outcome Evaluation: Jaja Carcamo is a 80 y.o. female  with atrial fibrillation, pulmonary hypertension, HFpEF, COPD, history of kidney transplant, DVT who presents to the hospital from [facility] where she has been staying since Nov 2024.  She is cleared for therapy by nursing and she is found resting in bed alert and oriented x3-4, somewhat [place] and a poor historian.  Patient is agreeable to activity.  Since Nov 2024 patient has been requiring assistance x2 for functional transfers and ADLs; she uses a wheelchair for primary mobility; non-ambulatory.  Most recently, for the past two weeks, she has not been out of bed at facility.  Upon evaluation, she requires dependent assistance for bed mobility and sitting EOB; she demos low activity tolerance and tolerance  to upright; tho vitals are stable.  Recommend return to SNF level of care for skilled PT to address balance, LE strengthening, functional transfers, and progressive mobilty training to tolerance.     Time Calculation:         PT Charges       Row Name 03/07/25 1254             Time Calculation    Start Time 1016  -RR      Stop Time 1032  -RR      Time Calculation (min) 16 min  -RR      PT Received On 03/07/25  -RR      PT - Next Appointment 03/09/25  -RR      PT Goal Re-Cert Due Date 03/21/25  -RR                User Key  (r) = Recorded By, (t) = Taken By, (c) = Cosigned By      Initials Name Provider Type    RR Aiyana Mendieta, PT Physical Therapist                  Therapy Charges for Today       Code Description Service Date Service Provider Modifiers Qty    42367004336 HC PT EVAL MOD COMPLEXITY 4 3/7/2025 Aiyana Mendieta PT GP 1            PT G-Codes  AM-PAC 6 Clicks Score (PT): 6  PT Discharge Summary  Anticipated Discharge Disposition (PT): skilled nursing facility    Aiyana Mendieta PT  3/7/2025

## 2025-03-07 NOTE — CASE MANAGEMENT/SOCIAL WORK
Continued Stay Note   [place]     Patient Name: Jaja Carcamo  MRN: [id number]  Today's Date: 3/7/2025    Admit Date: 3/3/2025    Plan: Plan to return to [facility]. Precert required to return as SNF, started by [facility] 3/7, pending (can return Pending). No PASRR required.   Discharge Plan       Row Name 03/07/25 1133       Plan    Plan Plan to return to [facility]. Precert required to return as SNF, started by [facility] 3/7, pending (can return Pending). No PASRR required.    Plan Comments [facility] started Prec[facility] 3/7 after PT/OT notes in, pending, does not have be approved in order to return to  LTC, can return as pending. DC Barriers: 4L NC, IV Abxs, monitor renal functions, Midodrine, IV Lasix -Plan to start PO Lasix 3/8, Nephro/WC/Vasc Sx following.               Expected Discharge Date and Time       Expected Discharge Date Expected Discharge Time    Mar 8, 2025               BEAU Hassan RN  ICU/CVU   O: [phone]  C: [phone]  [email or web address]

## 2025-03-07 NOTE — PLAN OF CARE
Goal Outcome Evaluation:    Pt having a lot of pain today in her legs. Skin around hips is very red and red spots on her abdomen. MD/NP aware via secure chat.     Lasix increased to 80 mg today. Pt having good urine output. She become hypotensive after the lasix but did eventually rebound.     No other changes.

## 2025-03-07 NOTE — PROGRESS NOTES
"RENAL/KCC:     LOS: 4 days    Patient Care Team:  Kvng Guillen MD as PCP - General (Family Medicine)  Jak Gavin MD as Cardiologist (Cardiology)    Chief Complaint:  TONYA/CKD, Kidney Transplant    Subjective     Interval History:   Chart reviewed  States no appetitie    Objective     Vital Sign Min/Max for last 24 hours  Temp  Min: 97.4 °F (36.3 °C)  Max: 98.4 °F (36.9 °C)   BP  Min: 46/21  Max: 126/64   Pulse  Min: 95  Max: 132   Resp  Min: 12  Max: 18   SpO2  Min: 74 %  Max: 100 %   Flow (L/min) (Oxygen Therapy)  Min: 4  Max: 4   No data recorded     Flowsheet Rows      Flowsheet Row First Filed Value   Admission Height 167.7 cm (66.02\") Documented at 03/03/2025 1641   Admission Weight 81.6 kg (179 lb 14.3 oz) Documented at 03/03/2025 1641            No intake/output data recorded.  I/O last 3 completed shifts:  In: 240 [P.O.:240]  Out: 1100 [Urine:1100]    Physical Exam:  GEN: Awake, NAD  ENT: PERRL, EOMI, MMM  NECK: Supple, no JVD  CHEST: CTAB, no W/R/C  CV: RRR, no M/G/R, +edema  ABD: Soft, NT, +BS  SKIN: Warm and Dry  NEURO: CN's intact      WBC WBC   Date Value Ref Range Status   03/07/2025 8.45 3.40 - 10.80 10*3/mm3 Final   03/06/2025 7.20 3.40 - 10.80 10*3/mm3 Final   03/05/2025 8.11 3.40 - 10.80 10*3/mm3 Final      HGB Hemoglobin   Date Value Ref Range Status   03/07/2025 8.4 (L) 12.0 - 15.9 g/dL Final   03/06/2025 8.6 (L) 12.0 - 15.9 g/dL Final   03/05/2025 8.1 (L) 12.0 - 15.9 g/dL Final      HCT Hematocrit   Date Value Ref Range Status   03/07/2025 28.7 (L) 34.0 - 46.6 % Final   03/06/2025 29.6 (L) 34.0 - 46.6 % Final   03/05/2025 28.1 (L) 34.0 - 46.6 % Final      Platlets No results found for: \"LABPLAT\"   MCV MCV   Date Value Ref Range Status   03/07/2025 102.1 (H) 79.0 - 97.0 fL Final   03/06/2025 102.8 (H) 79.0 - 97.0 fL Final   03/05/2025 102.9 (H) 79.0 - 97.0 fL Final          Sodium Sodium   Date Value Ref Range Status   03/07/2025 136 136 - 145 mmol/L Final   03/06/2025 136 136 - 145 mmol/L " "Final   03/05/2025 132 (L) 136 - 145 mmol/L Final      Potassium Potassium   Date Value Ref Range Status   03/07/2025 4.7 3.5 - 5.2 mmol/L Final     Comment:     Slight hemolysis detected by analyzer. Result may be falsely elevated.   03/06/2025 4.9 3.5 - 5.2 mmol/L Final   03/05/2025 5.1 3.5 - 5.2 mmol/L Final     Comment:     Slight hemolysis detected by analyzer. Result may be falsely elevated.      Chloride Chloride   Date Value Ref Range Status   03/07/2025 97 (L) 98 - 107 mmol/L Final   03/06/2025 98 98 - 107 mmol/L Final   03/05/2025 97 (L) 98 - 107 mmol/L Final      CO2 CO2   Date Value Ref Range Status   03/07/2025 28.0 22.0 - 29.0 mmol/L Final   03/06/2025 30.6 (H) 22.0 - 29.0 mmol/L Final   03/05/2025 26.8 22.0 - 29.0 mmol/L Final      BUN BUN   Date Value Ref Range Status   03/07/2025 50 (H) 8 - 23 mg/dL Final   03/06/2025 51 (H) 8 - 23 mg/dL Final   03/05/2025 51 (H) 8 - 23 mg/dL Final      Creatinine Creatinine   Date Value Ref Range Status   03/07/2025 1.41 (H) 0.57 - 1.00 mg/dL Final   03/06/2025 1.75 (H) 0.57 - 1.00 mg/dL Final   03/05/2025 1.91 (H) 0.57 - 1.00 mg/dL Final      Calcium Calcium   Date Value Ref Range Status   03/07/2025 9.7 8.6 - 10.5 mg/dL Final   03/06/2025 9.8 8.6 - 10.5 mg/dL Final   03/05/2025 9.5 8.6 - 10.5 mg/dL Final      PO4 No results found for: \"CAPO4\"   Albumin Albumin   Date Value Ref Range Status   03/07/2025 3.1 (L) 3.5 - 5.2 g/dL Final   03/06/2025 3.2 (L) 3.5 - 5.2 g/dL Final   03/05/2025 3.1 (L) 3.5 - 5.2 g/dL Final      Magnesium Magnesium   Date Value Ref Range Status   03/07/2025 2.0 1.6 - 2.4 mg/dL Final   03/06/2025 2.0 1.6 - 2.4 mg/dL Final   03/05/2025 2.0 1.6 - 2.4 mg/dL Final      Uric Acid No results found for: \"URICACID\"        Results Review:     I reviewed the patient's new clinical results.    apixaban, 5 mg, Oral, Q12H  cefTRIAXone, 1,000 mg, Intravenous, Q24H  DULoxetine, 40 mg, Oral, Daily  furosemide, 80 mg, Intravenous, " Q12H  HYDROcodone-acetaminophen, 1 tablet, Oral, Q6H  levothyroxine, 137 mcg, Oral, Q AM  metoprolol succinate XL, 12.5 mg, Oral, Q24H  midodrine, 15 mg, Oral, Q8H  mirtazapine, 15 mg, Oral, Nightly  mupirocin, 1 Application, Each Nare, BID  polyethylene glycol, 17 g, Oral, Daily  [Held by provider] sacubitril-valsartan, 1 tablet, Oral, Q12H  sennosides-docusate, 1 tablet, Oral, BID  sodium chloride, 10 mL, Intravenous, Q12H  sodium chloride, 10 mL, Intravenous, Q12H  tacrolimus, 0.5 mg, Oral, BID  vitamin B-12, 1,000 mcg, Oral, Daily      Medication Review: Reviewed    Assessment & Plan     TONYA  CKD3  Kidney Transplant  UTI  Fluid overload  Hypotension  Viral URI  RUE swelling - AVF with thrill and bruit  Anemia     PLAN: Cr improved to 1.4 (recent baseline Cr 1.2).  Continue Prograf and follow-up level.  Continue Midodrine for BP support.  Furosemide 80 mg IV x 2 doses today then transition to 80 mg PO BID tomorrow. Appreciate Vascular consult.  Continue to elevated RUE as able.  ABX per primary.  Will follow closely.        Naun Falcon MD  [facility]  03/07/25  08:25 EST

## 2025-03-07 NOTE — PLAN OF CARE
Goal Outcome Evaluation:  Plan of Care Reviewed With: patient           Outcome Evaluation: Jaja Carcamo is a 80 y.o. female  with atrial fibrillation, pulmonary hypertension, HFpEF, COPD, history of kidney transplant, DVT who presents to the hospital from [facility] where she has been staying since Nov 2024.  She is cleared for therapy by nursing and she is found resting in bed alert and oriented x3-4, somewhat [place] and a poor historian.  Patient is agreeable to activity.  Since Nov 2024 patient has been requiring assistance x2 for functional transfers and ADLs; she uses a wheelchair for primary mobility; non-ambulatory.  Most recently, for the past two weeks, she has not been out of bed at facility.  Upon evaluation, she requires dependent assistance for bed mobility and sitting EOB; she demos low activity tolerance and tolerance to upright; tho vitals are stable.  Recommend return to SNF level of care for skilled PT to address balance, LE strengthening, functional transfers, and progressive mobilty training to tolerance.    Anticipated Discharge Disposition (PT): skilled nursing facility

## 2025-03-07 NOTE — CONSULTS
Referring Provider: Juanis Lowe MD    Reason for Consultation: CHF, atrial fibrillation, hypotension      Patient Care Team:  Kvng Guillen MD as PCP - General (Family Medicine)  Jak Gavin MD as Cardiologist (Cardiology)      SUBJECTIVE     Chief Complaint: Fatigue, weakness, lethargy    History of present illness:  Jaja Carcamo is a 80 y.o. female  with atrial fibrillation, pulmonary hypertension, HFpEF, COPD, history of kidney transplant, DVT who presents to the hospital from [facility] where she resides.  Chronically she is bedbound and has bilateral lower extremity swelling for which she undergoes leg wrapping.  Recent history of UTI.  She presents with complaints of lethargy.  She was noted to have elevated proBNP, UTI, hypotension, chronic kidney disease.  Cardiology has been consulted for further evaluation and management    Review of systems:    Constitutional: + weakness, fatigue, fever, rigors, chills   Eyes: No vision changes, eye pain   ENT/oropharynx: No difficulty swallowing, sore throat, epistaxis, changes in hearing   Cardiovascular: No chest pain, chest tightness, palpitations, paroxysmal nocturnal dyspnea, orthopnea, diaphoresis, dizziness / syncopal episode   Respiratory: No shortness of breath, dyspnea on exertion, cough, wheezing, hemoptysis   Gastrointestinal: No abdominal pain, nausea, vomiting, diarrhea, bloody stools   Genitourinary: No hematuria, dysuria   Neurological: No headache, tremors, numbness, one-sided weakness    Musculoskeletal: No cramps, myalgias, joint pain, joint swelling   Integument: No rash, edema        Personal History:      Past Medical History:   Diagnosis Date    Allergic rhinitis 04/14/2015    Asthma 08/10/2017    Atrial flutter 05/11/2017    Chronic diarrhea 04/15/2019    Chronic hypoxemic respiratory failure 01/18/2024    Chronic pain 02/03/2015    COPD (chronic obstructive pulmonary disease)     COVID-19 virus detected  08/11/2022    Cytokine release syndrome, grade 1 08/16/2022    Edema of both lower extremities due to peripheral venous insufficiency 03/12/2021    ESRD on hemodialysis 05/22/2013    Essential (primary) hypertension 03/03/2022    Fracture of ulnar styloid 05/19/2022    Fracture of unspecified carpal bone, right wrist, subsequent encounter for fracture with routine healing 03/03/2022    Gastroesophageal reflux disease 11/12/2020    Gout 08/10/2017    Hearing loss 08/10/2017    History of appendectomy     History of DVT (deep vein thrombosis) 11/12/2020    History of kidney transplant 06/30/2017    History of lobectomy of lung 01/18/2024 03/08/2016:  RIGHT Lower Lobe Mass--> Right Video-assisted thoracoscopy with a moderate-to-large wedge resection of the RLL (by Dr. Haris Mcconnell @ [facility])--> Poorly differentiated carcinoma of the RLL.      History of repair of hip joint 05/21/2013    History of suicide attempt 05/20/2024    Hyperlipidemia 08/11/2014    Hypothyroidism, unspecified 03/03/2022    Infection due to extended spectrum beta lactamase (ESBL) producing bacteria 03/11/2016    No A2K system hx. +ESBL E coli urine on 3/11/16.      Irritable bowel syndrome 04/15/2019    Long term (current) use of immunosuppressive biologic 04/28/2021    Long term current use of anticoagulant therapy 01/18/2024    Malignant neoplasm of lung 08/10/2017    Menopausal flushing 08/30/2021    Mitral valve regurgitation 07/06/2015    Mixed anxiety and depressive disorder 04/30/2015    Non-small cell lung cancer 08/10/2017    Nonobstructive atherosclerosis of coronary artery 06/02/2019 08/12/2022: CATH: Prashant: NSTEMI assoc with Covid-19: LM:-nl;  LAD: diffuse, Ca++; 30%; CIRC: Dominant. Normal; RCA: small; 50% diffuse, proximal and mid-segment.      NSTEMI (non-ST elevated myocardial infarction) 08/11/2022    Obsessive-compulsive disorder 04/15/2019    Osteoarthritis 05/20/2024    Paroxysmal atrial fibrillation  05/11/2017    Paroxysmal supraventricular tachycardia 05/11/2017    Peripheral neuropathy 08/11/2014    Personal history of peptic ulcer disease 11/12/2020    Postoperative anemia due to acute blood loss 05/22/2013    Right wrist fracture 03/01/2022    Seasonal allergies 08/10/2017    Secondary hyperparathyroidism of renal origin 09/14/2015    Tricuspid valve regurgitation 03/15/2017    Ulcer of lower extremity 08/30/2021    Unspecified acute appendicitis 03/03/2022    Valvular heart disease 05/20/2024    Wegener's granulomatosis with renal involvement 03/03/2022       Past Surgical History:   Procedure Laterality Date    APPENDECTOMY      ARTERIOVENOUS FISTULA/SHUNT SURGERY Right 12/3/2024    Procedure: FISTULOGRAM  and angioplasty RIGHT ARM;  Surgeon: Paulino Alva II, MD;  Location: [facility] HYBRID OR;  Service: Vascular;  Laterality: Right;    BREAST SURGERY Left     cysts rmeoved    CARDIAC CATHETERIZATION Right 08/12/2022    Procedure: Left Heart Cath and coronary angiogram;  Surgeon: Jak Gavin MD;  Location: [facility] CATH INVASIVE LOCATION;  Service: Cardiology;  Laterality: Right;    CLOSED REDUCTION WRIST FRACTURE Right 03/01/2022    Procedure: WRIST CLOSED REDUCTION;  Surgeon: Gaudencio Townsend MD;  Location: [facility] MAIN OR;  Service: Orthopedics;  Laterality: Right;    CYSTOSCOPY      HIP ARTHROPLASTY      HYSTERECTOMY      LUNG LOBECTOMY Right     TRANSPLANTATION RENAL         Family History   Problem Relation Age of Onset    No Known Problems Mother     No Known Problems Father        Social History     Tobacco Use    Smoking status: Former     Passive exposure: Current    Smokeless tobacco: Never   Vaping Use    Vaping status: Former   Substance Use Topics    Alcohol use: Never    Drug use: Never        Home meds:  Prior to Admission medications    Medication Sig Start Date End Date Taking? Authorizing Provider   amoxicillin-clavulanate (AUGMENTIN) 875-125 MG per tablet Take 1 tablet by mouth 2 (Two) Times  73M w/ PMH of CKD, CHF 2/2 Cardiac amyloidosis, afib, HTN OA, gout who presents for R foot and L hand pain. Rheumatology consulted for gout flare evaluation.     #Acute oligoarticular gout flare: R first toe interphalangeal joint, R 1st MTP joint, L 2nd and 3rd PIP finger joint  -Risk factors: CKD, CHF  -uric acid : 12.3 on 2/19/2025  -Had 2 admissions 2/2025 for gout flare, was on steroid taper  -Meets criteria for urate lowering therapy: has had 2+ gout flares in 1 year and has a previous uric acid level >9 in setting of CKD  -Uric acid level goal should be < 6  -HLA  negative  -Xrays knee with OA, ankle xrays without erosions    Recommendations:  -  -c/w colchicine 0.6mg every other day      Cased discussed with Dr Toro Gray MD, PGY-4  Rheumatology Fellow  Reachable on TEAMS     a Day. 3/1/25  Yes Lorenzo Morales MD   apixaban (ELIQUIS) 5 MG tablet tablet Take 1 tablet by mouth Every 12 (Twelve) Hours. 8/16/22  Yes Clyde White DO   DULoxetine HCl 40 MG capsule delayed-release particles Take 1 capsule by mouth Daily. Indications: Major Depressive Disorder 7/11/24  Yes Court Vences MD   ferrous sulfate 325 (65 FE) MG tablet Take 1 tablet by mouth 3 times a day.   Yes Court Vences MD   furosemide (LASIX) 40 MG tablet Take 1 tablet by mouth 2 (Two) Times a Day.   Yes Court Vences MD   hydrOXYzine (ATARAX) 25 MG tablet Take 1 tablet by mouth Every Night.   Yes Court Vences MD   LORazepam (ATIVAN) 0.5 MG tablet Take 1 tablet by mouth Every 6 (Six) Hours.   Yes Court Vences MD   metoprolol tartrate (LOPRESSOR) 25 MG tablet Take 0.5 tablets by mouth 2 (Two) Times a Day.   Yes Court Vences MD   midodrine (PROAMATINE) 5 MG tablet Take 3 tablets by mouth 3 (Three) Times a Day Before Meals.   Yes Court Vences MD   mirtazapine (REMERON) 15 MG tablet Take 1 tablet by mouth Every Night.   Yes Court Vences MD   polyethylene glycol (MiraLax) 17 GM/SCOOP powder Take 17 g by mouth Daily.   Yes Court Vences MD   predniSONE (DELTASONE) 5 MG tablet Take 1 tablet by mouth Daily. Indications: ACUTE EXACERBATION OF COPD (INACTIVE) 7/11/24  Yes Court Vences MD   sacubitril-valsartan (Entresto) 24-26 MG tablet Take 1 tablet by mouth 2 (Two) Times a Day.   Yes Court Vences MD   sennosides-docusate (PERICOLACE) 8.6-50 MG per tablet Take 1 tablet by mouth 2 (Two) Times a Day.   Yes Court Vences MD   simvastatin (ZOCOR) 20 MG tablet Take 1 tablet by mouth Every Night.   Yes Court Vences MD   tacrolimus (PROGRAF) 0.5 MG capsule Take 1 capsule by mouth 2 (Two) Times a Day. 6/13/24  Yes Janene Archer APRN   vitamin B-12 (CYANOCOBALAMIN) 1000 MCG tablet Take 1 tablet by mouth Daily.   Yes  73M w/ PMH of CKD, CHF 2/2 Cardiac amyloidosis, afib, HTN OA, gout who presents for R foot and L hand pain. Rheumatology consulted for gout flare evaluation.     #Acute oligoarticular gout flare: R first toe interphalangeal joint, R 1st MTP joint, L 2nd and 3rd PIP finger joint  -Risk factors: CKD, CHF  -uric acid : 12.3 on 2/19/2025  -Had 2 admissions 2/2025 for gout flare, was on steroid taper  -Meets criteria for urate lowering therapy: has had 2+ gout flares in 1 year and has a previous uric acid level >9 in setting of CKD  -Uric acid level goal should be < 6  -HLA  negative  -Xrays knee with OA, ankle xrays without erosions    Recommendations:  -C/w solumedrol 40mg tomorrow (3/8)> Decrease to solumedrol 30mg on 3/9-3/10. will re-eval on 3/10  -c/w colchicine 0.6mg one pill every other day      Cased discussed with Dr Toro Gray MD, PGY-4  Rheumatology Fellow  Reachable on TEAMS     Court Vences MD   vitamin D (ERGOCALCIFEROL) 1.25 MG (96676 UT) capsule capsule Take 1 capsule by mouth 1 (One) Time Per Week.   Yes Court Vences MD   acetaminophen (Tylenol) 325 MG tablet Take 2 tablets by mouth Every 4 (Four) Hours As Needed for Fever or Mild Pain. Indications: Pain 3/13/20   Court Vences MD   albuterol sulfate  (90 Base) MCG/ACT inhaler Inhale 2 puffs Every 6 (Six) Hours As Needed for Wheezing.    Court Vences MD   HYDROcodone-acetaminophen (NORCO)  MG per tablet Take 1 tablet by mouth Every 6 (Six) Hours.    Court Vences MD       Allergies:     Zolpidem    Scheduled Meds:apixaban, 5 mg, Oral, Q12H  cefTRIAXone, 1,000 mg, Intravenous, Q24H  DULoxetine, 40 mg, Oral, Daily  HYDROcodone-acetaminophen, 1 tablet, Oral, Q6H  levothyroxine, 137 mcg, Oral, Q AM  metoprolol tartrate, 12.5 mg, Oral, Q12H  midodrine, 15 mg, Oral, Q8H  mirtazapine, 15 mg, Oral, Nightly  mupirocin, 1 Application, Each Nare, BID  polyethylene glycol, 17 g, Oral, Daily  [Held by provider] sacubitril-valsartan, 1 tablet, Oral, Q12H  sennosides-docusate, 1 tablet, Oral, BID  sodium chloride, 10 mL, Intravenous, Q12H  sodium chloride, 10 mL, Intravenous, Q12H  tacrolimus, 0.5 mg, Oral, BID  vitamin B-12, 1,000 mcg, Oral, Daily      Continuous Infusions:   PRN Meds:  acetaminophen **OR** acetaminophen    aluminum-magnesium hydroxide-simethicone    senna-docusate sodium **AND** polyethylene glycol **AND** bisacodyl **AND** bisacodyl    hydrOXYzine    [Held by provider] LORazepam    nitroglycerin    ondansetron ODT **OR** ondansetron    prochlorperazine    [COMPLETED] Insert Peripheral IV **AND** sodium chloride    sodium chloride    sodium chloride    sodium chloride    sodium chloride      OBJECTIVE    Vital Signs  Vitals:    03/06/25 0910 03/06/25 1000 03/06/25 1100 03/06/25 1122   BP: 119/53 (!) 46/21 (!) 90/33    Pulse:  104 103    Resp:       Temp:    97.6 °F (36.4 °C)  "  TempSrc:    Axillary   SpO2:  96% 98%    Weight:       Height:           Flowsheet Rows      Flowsheet Row First Filed Value   Admission Height 167.7 cm (66.02\") Documented at 03/03/2025 1641   Admission Weight 81.6 kg (179 lb 14.3 oz) Documented at 03/03/2025 1641              Intake/Output Summary (Last 24 hours) at 3/6/2025 1322  Last data filed at 3/6/2025 0600  Gross per 24 hour   Intake 240 ml   Output 550 ml   Net -310 ml        Telemetry: Atrial fibrillation    Physical Exam:  The patient is alert, oriented and in no distress.  Vital signs as noted above.  Head and neck revealed no carotid bruits or jugular venous distention.  No thyromegaly or lymphadenopathy is present  Lungs clear.  No wheezing.  Breath sounds are normal bilaterally.  Heart: Normal first and second heart sounds. No murmur.  No precordial rub is present.  No gallop is present.  Abdomen: Soft and nontender.  No organomegaly is present.  Extremities with bilateral lower extremity edema  Skin: Warm and dry.  Musculoskeletal system is grossly normal.  CNS grossly normal.       Results Review:  I have personally reviewed the results from the time of this admission to 3/6/2025 13:22 EST and agree with these findings:  []  Laboratory  []  Microbiology  []  Radiology  []  EKG/Telemetry   []  Cardiology/Vascular   []  Pathology  []  Old records  []  Other:    Most notable findings include:     Lab Results (last 24 hours)       Procedure Component Value Units Date/Time    Magnesium [[id number]]  (Normal) Collected: 03/06/25 0907    Specimen: Blood from Arm, Left Updated: 03/06/25 1012     Magnesium 2.0 mg/dL     Comprehensive Metabolic Panel [[id number]]  (Abnormal) Collected: 03/06/25 0907    Specimen: Blood from Arm, Left Updated: 03/06/25 1012     Glucose 96 mg/dL      BUN 51 mg/dL      Creatinine 1.75 mg/dL      Sodium 136 mmol/L      Potassium 4.9 mmol/L      Chloride 98 mmol/L      CO2 30.6 mmol/L      Calcium 9.8 mg/dL      Total Protein 6.1 " g/dL      Albumin 3.2 g/dL      ALT (SGPT) <5 U/L      AST (SGOT) 15 U/L      Alkaline Phosphatase 75 U/L      Total Bilirubin 0.5 mg/dL      Globulin 2.9 gm/dL      A/G Ratio 1.1 g/dL      BUN/Creatinine Ratio 29.1     Anion Gap 7.4 mmol/L      eGFR 29.5 mL/min/1.73     Narrative:      GFR Categories in Chronic Kidney Disease (CKD)      GFR Category          GFR (mL/min/1.73)    Interpretation  G1                     90 or greater         Normal or high (1)  G2                      60-89                Mild decrease (1)  G3a                   45-59                Mild to moderate decrease  G3b                   30-44                Moderate to severe decrease  G4                    15-29                Severe decrease  G5                    14 or less           Kidney failure          (1)In the absence of evidence of kidney disease, neither GFR category G1 or G2 fulfill the criteria for CKD.    eGFR calculation 2021 CKD-EPI creatinine equation, which does not include race as a factor    Phosphorus [[id number]]  (Normal) Collected: 03/06/25 0907    Specimen: Blood from Arm, Left Updated: 03/06/25 0940     Phosphorus 3.3 mg/dL     CBC & Differential [[id number]]  (Abnormal) Collected: 03/06/25 0907    Specimen: Blood from Arm, Left Updated: 03/06/25 0914    Narrative:      The following orders were created for panel order CBC & Differential.  Procedure                               Abnormality         Status                     ---------                               -----------         ------                     CBC Auto Differential[[id number]]        Abnormal            Final result                 Please view results for these tests on the individual orders.    CBC Auto Differential [[id number]]  (Abnormal) Collected: 03/06/25 0907    Specimen: Blood from Arm, Left Updated: 03/06/25 0914     WBC 7.20 10*3/mm3      RBC 2.88 10*6/mm3      Hemoglobin 8.6 g/dL      Hematocrit 29.6 %      .8 fL      MCH 29.9 pg       MCHC 29.1 g/dL      RDW 18.0 %      RDW-SD 64.0 fl      MPV 9.1 fL      Platelets 167 10*3/mm3      Neutrophil % 67.5 %      Lymphocyte % 14.6 %      Monocyte % 9.7 %      Eosinophil % 7.6 %      Basophil % 0.3 %      Immature Grans % 0.3 %      Neutrophils, Absolute 4.86 10*3/mm3      Lymphocytes, Absolute 1.05 10*3/mm3      Monocytes, Absolute 0.70 10*3/mm3      Eosinophils, Absolute 0.55 10*3/mm3      Basophils, Absolute 0.02 10*3/mm3      Immature Grans, Absolute 0.02 10*3/mm3      nRBC 0.0 /100 WBC     Blood Culture - Blood, Arm, Left [[id number]]  (Normal) Collected: 03/03/25 1716    Specimen: Blood from Arm, Left Updated: 03/05/25 1731     Blood Culture No growth at 2 days    Blood Culture - Blood, Arm, Left [[id number]]  (Normal) Collected: 03/03/25 1716    Specimen: Blood from Arm, Left Updated: 03/05/25 1731     Blood Culture No growth at 2 days    Narrative:      Less than seven (7) mL's of blood was collected.  Insufficient quantity may yield false negative results.            Imaging Results (Last 24 Hours)       ** No results found for the last 24 hours. **            LAB RESULTS (LAST 7 DAYS)    CBC  Results from last 7 days   Lab Units 03/06/25  0907 03/05/25  0744 03/04/25  0231 03/04/25  0124 03/03/25  1712 03/01/25  1002   WBC 10*3/mm3 7.20 8.11  --  8.18 9.73 9.95   RBC 10*6/mm3 2.88* 2.73*  --  3.05* 3.17* 3.12*   HEMOGLOBIN g/dL 8.6* 8.1*  --  9.1* 9.4* 9.1*   HEMOGLOBIN, POC g/dL  --   --  10.0*  --   --   --    HEMATOCRIT % 29.6* 28.1*  --  31.5* 32.8* 32.2*   HEMATOCRIT POC %  --   --  29*  --   --   --    MCV fL 102.8* 102.9*  --  103.3* 103.5* 103.2*   PLATELETS 10*3/mm3 167 168  --  204 234 185       BMP  Results from last 7 days   Lab Units 03/06/25  0907 03/05/25  0744 03/04/25  0549 03/04/25  0307 03/04/25  0231 03/04/25  0124 03/03/25  1712 03/01/25  1002   SODIUM mmol/L 136 132* 140 143  --   --  135* 137   POTASSIUM mmol/L 4.9 5.1 4.9 4.8  --   --  5.3* 5.0   CHLORIDE mmol/L 98 97*  103 104  --   --  97* 99   CO2 mmol/L 30.6* 26.8 28.3 29.9*  --   --  30.8* 30.8*   BUN mg/dL 51* 51* 48* 48*  --   --  48* 37*   CREATININE mg/dL 1.75* 1.91* 1.84* 1.87* 2.07*  --  2.01* 1.29*   GLUCOSE mg/dL 96 99 120* 128*  --   --  134* 114*   MAGNESIUM mg/dL 2.0 2.0 2.0 2.0  --   --   --   --    PHOSPHORUS mg/dL 3.3 3.3  --   --   --  4.6*  --   --        CMP   Results from last 7 days   Lab Units 03/06/25  0907 03/05/25  0744 03/04/25  0549 03/04/25  0307 03/04/25  0231 03/04/25  0124 03/03/25  1712 03/01/25  1002   SODIUM mmol/L 136 132* 140 143  --   --  135* 137   POTASSIUM mmol/L 4.9 5.1 4.9 4.8  --   --  5.3* 5.0   CHLORIDE mmol/L 98 97* 103 104  --   --  97* 99   CO2 mmol/L 30.6* 26.8 28.3 29.9*  --   --  30.8* 30.8*   BUN mg/dL 51* 51* 48* 48*  --   --  48* 37*   CREATININE mg/dL 1.75* 1.91* 1.84* 1.87* 2.07*  --  2.01* 1.29*   GLUCOSE mg/dL 96 99 120* 128*  --   --  134* 114*   ALBUMIN g/dL 3.2* 3.1* 3.7  --   --   --  3.1*  --    BILIRUBIN mg/dL 0.5 0.4 0.4  --   --   --  0.4  --    ALK PHOS U/L 75 69 67  --   --   --  85  --    AST (SGOT) U/L 15 13 18  --   --   --  15  --    ALT (SGPT) U/L <5 <5 5  --   --   --  5  --    AMMONIA umol/L  --   --   --   --   --  22  --   --        BNP        TROPONIN  Results from last 7 days   Lab Units 03/03/25  1839   HSTROP T ng/L 49*       CoAg        Creatinine Clearance  Estimated Creatinine Clearance: 28.1 mL/min (A) (by C-G formula based on SCr of 1.75 mg/dL (H)).    ABG  Results from last 7 days   Lab Units 03/04/25  0231   PH, ARTERIAL pH units 7.291*   PCO2, ARTERIAL mm Hg 69.0*   PO2 ART mm Hg 82.0*   O2 SATURATION ART % 94.0   BASE EXCESS ART mmol/L 5.2*         Radiology  No radiology results for the last day      EKG  I personally viewed and interpreted the patient's EKG/Telemetry data:  ECG 12 Lead Altered Mental Status   Final Result   HEART RATE=96  bpm   RR Kfeykyxe=253  ms   CO Interval=  ms   P Horizontal Axis=  deg   P Front Axis=  deg   QRSD  Interval=80  ms   QT Pftmvtsa=376  ms   DFyG=287  ms   QRS Axis=-39  deg   T Wave Axis=90  deg   - ABNORMAL ECG -   Atrial fibrillation   Ventricular premature complex   Left axis deviation   Anteroseptal  infarct, age indeterminate   When compared with ECG of 01-Mar-2025 09:40:43,   Significant axis, voltage or hypertrophy change   Electronically Signed By: Paulino Miller (TWILA) 2025-03-04 07:28:16   Date and Time of Study:2025-03-03 16:45:51      Telemetry Scan   Final Result      Telemetry Scan   Final Result            Echocardiogram:    Results for orders placed during the hospital encounter of 11/29/24    Adult Transthoracic Echo Complete W/ Cont if Necessary Per Protocol    Interpretation Summary    Left ventricular systolic function is normal. Calculated left ventricular EF = 70% Left ventricular ejection fraction appears to be 66 - 70%.    Left ventricular wall thickness is consistent with moderate to severe concentric hypertrophy.    Left ventricular diastolic function is consistent with (grade III w/high LAP) reversible restrictive pattern.    Moderately reduced right ventricular systolic function noted.    The right ventricular cavity is severely dilated.    The left atrial cavity is severely dilated.    The right atrial cavity is severely  dilated.    There is mild calcification of the aortic valve mainly affecting the left coronary and right coronary cusp(s).    Severe tricuspid valve regurgitation is present.    Estimated right ventricular systolic pressure from tricuspid regurgitation is markedly elevated (>55 mmHg).    Severe pulmonary hypertension is present.    There is a moderate sized left pleural effusion.        Stress Test:        Cardiac Catheterization:  Results for orders placed during the hospital encounter of 08/11/22    Cardiac Catheterization/Vascular Study    Conclusion  IMPRESSIONS  Non obstructive CAD        Other:      ASSESSMENT & PLAN:    Principal Problem:    UTI (urinary tract  infection)  Active Problems:    COPD (chronic obstructive pulmonary disease)    Hypothyroidism, unspecified    History of kidney transplant    Long term (current) use of immunosuppressive biologic    Long term current use of anticoagulant therapy    History of DVT (deep vein thrombosis)    Pulmonary hypertension    Decubitus ulcer of foot, stage 3    Edema of both lower extremities due to peripheral venous insufficiency    Mixed anxiety and depressive disorder    Essential (primary) hypertension    Paroxysmal atrial fibrillation    Acute exacerbation of CHF (congestive heart failure)    End stage renal disease    Edema of right upper arm    TONYA (acute kidney injury)    Pressure injury of right heel, stage 3    Atrial fibrillation  [id number] score is 5  Continue Eliquis 5 mg p.o. twice daily for stroke prevention  Continue metoprolol for rate control  Unable to tolerate higher dose of metoprolol or Cardizem due to low blood pressure     HFpEF/Pulmonary hypertension  Presented with proBNP of 17,000 (compared to 4800 during previous admission)  Diuretic dosing/dialysis per nephrology  Previously on Lasix and metolazone  Jardiance was held due to renal dysfunction  Monitor I's and O's and replace electrolytes as needed  Continue metoprolol: Switch to succinate  Echocardiogram shows preserved LV function with severe tricuspid valve regurgitation and severe pulmonary hypertension.  Blood pressure is too low for use of vasodilators  She weighs 173 pounds.  15 pounds more than previous hospital stay    Hypotension  Requiring midodrine  Entresto is on hold     UTI  Currently on antibiotics     History of kidney transplant  On tacrolimus  Creatinine 1.75, GFR is 29.5  Diuretics per nephrology  AV fistula in place  Discussions for hemodialysis    Right IJ DVT  Diagnosed in December 2024  Repeat venous duplex is negative for DVT  VQ scan with low probability of PE  Continue Eliquis    Anemia  H&H 8.6/29.6  Closely monitor  while on anticoagulation     COPD  History of lung cancer  Currently on 4 L of oxygen.  Bronchodilators and supplemental oxygen.    Anxiety/depression  She is on duloxetine and mirtazapine      CODE STATUS is DNI DNR       Jak Gavin MD  03/06/25  13:22 EST

## 2025-03-07 NOTE — PROGRESS NOTES
LOS: 4 days   Patient Care Team:  Kvng Guillen MD as PCP - General (Family Medicine)  Jak Gavin MD as Cardiologist (Cardiology)    Subjective     Patient denies any new complaints    Review of Systems   Constitutional:  Positive for activity change, appetite change and fatigue.   HENT: Negative.     Respiratory: Negative.     Cardiovascular:  Positive for leg swelling.   Gastrointestinal: Negative.    Genitourinary: Negative.    Musculoskeletal:  Positive for gait problem.   Neurological:  Positive for weakness.   Psychiatric/Behavioral: Negative.             Objective     Vital Signs  Temp:  [97.4 °F (36.3 °C)-98.4 °F (36.9 °C)] 97.4 °F (36.3 °C)  Heart Rate:  [] 100  Resp:  [12-18] 18  BP: ()/(21-84) 126/64      Physical Exam  Vitals reviewed.   Constitutional:       Appearance: She is not ill-appearing.   HENT:      Head: Normocephalic and atraumatic.      Right Ear: External ear normal.      Left Ear: External ear normal.      Nose: Nose normal.      Mouth/Throat:      Mouth: Mucous membranes are dry.   Eyes:      General:         Right eye: No discharge.         Left eye: No discharge.   Cardiovascular:      Rate and Rhythm: Normal rate and regular rhythm.      Pulses: Normal pulses.      Heart sounds: Normal heart sounds.   Pulmonary:      Effort: Pulmonary effort is normal.      Breath sounds: Normal breath sounds.   Abdominal:      General: Bowel sounds are normal.      Palpations: Abdomen is soft.   Musculoskeletal:         General: Normal range of motion.      Cervical back: Normal range of motion.   Skin:     General: Skin is warm and dry.   Neurological:      Mental Status: She is alert and oriented to person, place, and time.   Psychiatric:         Behavior: Behavior normal.              Results Review:    Lab Results (last 24 hours)       Procedure Component Value Units Date/Time    Magnesium [[id number]]  (Normal) Collected: 03/07/25 0541    Specimen: Blood Updated: 03/07/25  0650     Magnesium 2.0 mg/dL     Comprehensive Metabolic Panel [[id number]]  (Abnormal) Collected: 03/07/25 0541    Specimen: Blood Updated: 03/07/25 0650     Glucose 83 mg/dL      BUN 50 mg/dL      Creatinine 1.41 mg/dL      Sodium 136 mmol/L      Potassium 4.7 mmol/L      Comment: Slight hemolysis detected by analyzer. Result may be falsely elevated.        Chloride 97 mmol/L      CO2 28.0 mmol/L      Calcium 9.7 mg/dL      Total Protein 6.0 g/dL      Albumin 3.1 g/dL      ALT (SGPT) <5 U/L      AST (SGOT) 13 U/L      Alkaline Phosphatase 72 U/L      Total Bilirubin 0.5 mg/dL      Globulin 2.9 gm/dL      A/G Ratio 1.1 g/dL      BUN/Creatinine Ratio 35.5     Anion Gap 11.0 mmol/L      eGFR 38.3 mL/min/1.73     Narrative:      GFR Categories in Chronic Kidney Disease (CKD)      GFR Category          GFR (mL/min/1.73)    Interpretation  G1                     90 or greater         Normal or high (1)  G2                      60-89                Mild decrease (1)  G3a                   45-59                Mild to moderate decrease  G3b                   30-44                Moderate to severe decrease  G4                    15-29                Severe decrease  G5                    14 or less           Kidney failure          (1)In the absence of evidence of kidney disease, neither GFR category G1 or G2 fulfill the criteria for CKD.    eGFR calculation 2021 CKD-EPI creatinine equation, which does not include race as a factor    Phosphorus [[id number]]  (Normal) Collected: 03/07/25 0541    Specimen: Blood Updated: 03/07/25 0623     Phosphorus 3.3 mg/dL     CBC & Differential [[id number]]  (Abnormal) Collected: 03/07/25 0541    Specimen: Blood Updated: 03/07/25 0550    Narrative:      The following orders were created for panel order CBC & Differential.  Procedure                               Abnormality         Status                     ---------                               -----------         ------                      CBC Auto Differential[[id number]]        Abnormal            Final result                 Please view results for these tests on the individual orders.    CBC Auto Differential [[id number]]  (Abnormal) Collected: 03/07/25 0541    Specimen: Blood Updated: 03/07/25 0550     WBC 8.45 10*3/mm3      RBC 2.81 10*6/mm3      Hemoglobin 8.4 g/dL      Hematocrit 28.7 %      .1 fL      MCH 29.9 pg      MCHC 29.3 g/dL      RDW 18.2 %      RDW-SD 67.2 fl      MPV 9.2 fL      Platelets 161 10*3/mm3      Neutrophil % 66.6 %      Lymphocyte % 16.3 %      Monocyte % 10.2 %      Eosinophil % 6.5 %      Basophil % 0.2 %      Immature Grans % 0.2 %      Neutrophils, Absolute 5.62 10*3/mm3      Lymphocytes, Absolute 1.38 10*3/mm3      Monocytes, Absolute 0.86 10*3/mm3      Eosinophils, Absolute 0.55 10*3/mm3      Basophils, Absolute 0.02 10*3/mm3      Immature Grans, Absolute 0.02 10*3/mm3      nRBC 0.0 /100 WBC     Blood Culture - Blood, Arm, Left [[id number]]  (Normal) Collected: 03/03/25 1716    Specimen: Blood from Arm, Left Updated: 03/06/25 1731     Blood Culture No growth at 3 days    Blood Culture - Blood, Arm, Left [[id number]]  (Normal) Collected: 03/03/25 1716    Specimen: Blood from Arm, Left Updated: 03/06/25 1731     Blood Culture No growth at 3 days    Narrative:      Less than seven (7) mL's of blood was collected.  Insufficient quantity may yield false negative results.    Magnesium [[id number]]  (Normal) Collected: 03/06/25 0907    Specimen: Blood from Arm, Left Updated: 03/06/25 1012     Magnesium 2.0 mg/dL     Comprehensive Metabolic Panel [[id number]]  (Abnormal) Collected: 03/06/25 0907    Specimen: Blood from Arm, Left Updated: 03/06/25 1012     Glucose 96 mg/dL      BUN 51 mg/dL      Creatinine 1.75 mg/dL      Sodium 136 mmol/L      Potassium 4.9 mmol/L      Chloride 98 mmol/L      CO2 30.6 mmol/L      Calcium 9.8 mg/dL      Total Protein 6.1 g/dL      Albumin 3.2 g/dL      ALT (SGPT) <5 U/L      AST (SGOT)  15 U/L      Alkaline Phosphatase 75 U/L      Total Bilirubin 0.5 mg/dL      Globulin 2.9 gm/dL      A/G Ratio 1.1 g/dL      BUN/Creatinine Ratio 29.1     Anion Gap 7.4 mmol/L      eGFR 29.5 mL/min/1.73     Narrative:      GFR Categories in Chronic Kidney Disease (CKD)      GFR Category          GFR (mL/min/1.73)    Interpretation  G1                     90 or greater         Normal or high (1)  G2                      60-89                Mild decrease (1)  G3a                   45-59                Mild to moderate decrease  G3b                   30-44                Moderate to severe decrease  G4                    15-29                Severe decrease  G5                    14 or less           Kidney failure          (1)In the absence of evidence of kidney disease, neither GFR category G1 or G2 fulfill the criteria for CKD.    eGFR calculation 2021 CKD-EPI creatinine equation, which does not include race as a factor             Imaging Results (Last 24 Hours)       ** No results found for the last 24 hours. **                 I reviewed the patient's new clinical results.    Medication Review:   Scheduled Meds:apixaban, 5 mg, Oral, Q12H  cefTRIAXone, 1,000 mg, Intravenous, Q24H  DULoxetine, 40 mg, Oral, Daily  furosemide, 80 mg, Intravenous, Q12H  [START ON 3/8/2025] furosemide, 80 mg, Oral, BID Diuretics  HYDROcodone-acetaminophen, 1 tablet, Oral, Q6H  levothyroxine, 137 mcg, Oral, Q AM  metoprolol succinate XL, 12.5 mg, Oral, Q24H  midodrine, 15 mg, Oral, Q8H  mirtazapine, 15 mg, Oral, Nightly  mupirocin, 1 Application, Each Nare, BID  polyethylene glycol, 17 g, Oral, Daily  [Held by provider] sacubitril-valsartan, 1 tablet, Oral, Q12H  sennosides-docusate, 1 tablet, Oral, BID  sodium chloride, 10 mL, Intravenous, Q12H  sodium chloride, 10 mL, Intravenous, Q12H  tacrolimus, 0.5 mg, Oral, BID  vitamin B-12, 1,000 mcg, Oral, Daily      Continuous Infusions:   PRN Meds:.  acetaminophen **OR** acetaminophen     albuterol    aluminum-magnesium hydroxide-simethicone    senna-docusate sodium **AND** polyethylene glycol **AND** bisacodyl **AND** bisacodyl    hydrOXYzine    [Held by provider] LORazepam    nitroglycerin    ondansetron ODT **OR** ondansetron    prochlorperazine    [COMPLETED] Insert Peripheral IV **AND** sodium chloride    sodium chloride    sodium chloride    sodium chloride    sodium chloride    sodium chloride     Interval History:    Assessment & Plan     UTI (urinary tract infection)  Acute exacerbation of CHF (congestive heart failure)  End stage renal disease  Edema of right upper arm  TONYA (acute kidney injury)  Pressure injury of right heel, stage 3  Edema of both lower extremities due to peripheral venous insufficiency  COPD (chronic obstructive pulmonary disease)  Hypothyroidism, unspecified  History of kidney transplant  Long term (current) use of immunosuppressive biologic  Long term current use of anticoagulant therapy  History of DVT (deep vein thrombosis)  Pulmonary hypertension  Mixed anxiety and depressive disorder  Essential (primary) hypertension  Paroxysmal atrial fibrillation    Plan of care  Urine culture negative afebrile no leukocytosis will dc rocephin. Nephrology following for diuresis and management. Creatinine is improving/ continue Prograf and midodrine for support. Plan is to transition to po diuretic tomorrow. She does have working fistula if needed for HD. Appreciate wound care recommendations.    Plan for disposition::back to [facility]     Janene Archer, APRN  03/07/25  09:56 EST

## 2025-03-08 LAB
ALBUMIN SERPL-MCNC: 2.9 G/DL (ref 3.5–5.2)
ALBUMIN/GLOB SERPL: 1 G/DL
ALP SERPL-CCNC: 70 U/L (ref 39–117)
ALT SERPL W P-5'-P-CCNC: <5 U/L (ref 1–33)
ANION GAP SERPL CALCULATED.3IONS-SCNC: 8.9 MMOL/L (ref 5–15)
AST SERPL-CCNC: 22 U/L (ref 1–32)
BACTERIA SPEC AEROBE CULT: NORMAL
BACTERIA SPEC AEROBE CULT: NORMAL
BASOPHILS # BLD AUTO: 0.03 10*3/MM3 (ref 0–0.2)
BASOPHILS NFR BLD AUTO: 0.4 % (ref 0–1.5)
BILIRUB SERPL-MCNC: 0.4 MG/DL (ref 0–1.2)
BUN SERPL-MCNC: 48 MG/DL (ref 8–23)
BUN/CREAT SERPL: 40.3 (ref 7–25)
CALCIUM SPEC-SCNC: 8.9 MG/DL (ref 8.6–10.5)
CHLORIDE SERPL-SCNC: 101 MMOL/L (ref 98–107)
CO2 SERPL-SCNC: 26.1 MMOL/L (ref 22–29)
CREAT SERPL-MCNC: 1.19 MG/DL (ref 0.57–1)
DEPRECATED RDW RBC AUTO: 67.4 FL (ref 37–54)
EGFRCR SERPLBLD CKD-EPI 2021: 46.9 ML/MIN/1.73
EOSINOPHIL # BLD AUTO: 0.54 10*3/MM3 (ref 0–0.4)
EOSINOPHIL NFR BLD AUTO: 7.7 % (ref 0.3–6.2)
ERYTHROCYTE [DISTWIDTH] IN BLOOD BY AUTOMATED COUNT: 18.6 % (ref 12.3–15.4)
GLOBULIN UR ELPH-MCNC: 3 GM/DL
GLUCOSE SERPL-MCNC: 76 MG/DL (ref 65–99)
HCT VFR BLD AUTO: 28.4 % (ref 34–46.6)
HGB BLD-MCNC: 8.3 G/DL (ref 12–15.9)
IMM GRANULOCYTES # BLD AUTO: 0.03 10*3/MM3 (ref 0–0.05)
IMM GRANULOCYTES NFR BLD AUTO: 0.4 % (ref 0–0.5)
LYMPHOCYTES # BLD AUTO: 0.96 10*3/MM3 (ref 0.7–3.1)
LYMPHOCYTES NFR BLD AUTO: 13.7 % (ref 19.6–45.3)
MAGNESIUM SERPL-MCNC: 1.8 MG/DL (ref 1.6–2.4)
MCH RBC QN AUTO: 29.6 PG (ref 26.6–33)
MCHC RBC AUTO-ENTMCNC: 29.2 G/DL (ref 31.5–35.7)
MCV RBC AUTO: 101.4 FL (ref 79–97)
MONOCYTES # BLD AUTO: 0.71 10*3/MM3 (ref 0.1–0.9)
MONOCYTES NFR BLD AUTO: 10.1 % (ref 5–12)
NEUTROPHILS NFR BLD AUTO: 4.76 10*3/MM3 (ref 1.7–7)
NEUTROPHILS NFR BLD AUTO: 67.7 % (ref 42.7–76)
NRBC BLD AUTO-RTO: 0 /100 WBC (ref 0–0.2)
PHOSPHATE SERPL-MCNC: 3.2 MG/DL (ref 2.5–4.5)
PLATELET # BLD AUTO: 132 10*3/MM3 (ref 140–450)
PMV BLD AUTO: 11 FL (ref 6–12)
POTASSIUM SERPL-SCNC: 4.7 MMOL/L (ref 3.5–5.2)
PROT SERPL-MCNC: 5.9 G/DL (ref 6–8.5)
RBC # BLD AUTO: 2.8 10*6/MM3 (ref 3.77–5.28)
SODIUM SERPL-SCNC: 136 MMOL/L (ref 136–145)
TACROLIMUS BLD LC/MS/MS-MCNC: 3.9 NG/ML (ref 5–20)
TACROLIMUS BLD LC/MS/MS-MCNC: 5.2 NG/ML (ref 5–20)
WBC NRBC COR # BLD AUTO: 7.03 10*3/MM3 (ref 3.4–10.8)

## 2025-03-08 PROCEDURE — 84100 ASSAY OF PHOSPHORUS: CPT

## 2025-03-08 PROCEDURE — 83735 ASSAY OF MAGNESIUM: CPT

## 2025-03-08 PROCEDURE — 80053 COMPREHEN METABOLIC PANEL: CPT

## 2025-03-08 PROCEDURE — 85025 COMPLETE CBC W/AUTO DIFF WBC: CPT

## 2025-03-08 PROCEDURE — 63710000001 TACROLIMUS PER 1 MG: Performed by: INTERNAL MEDICINE

## 2025-03-08 RX ADMIN — ACETAMINOPHEN 650 MG: 325 TABLET, FILM COATED ORAL at 22:22

## 2025-03-08 RX ADMIN — LEVOTHYROXINE SODIUM 137 MCG: 0.03 TABLET ORAL at 05:52

## 2025-03-08 RX ADMIN — Medication 10 ML: at 08:12

## 2025-03-08 RX ADMIN — FUROSEMIDE 80 MG: 40 TABLET ORAL at 17:36

## 2025-03-08 RX ADMIN — Medication 10 ML: at 08:11

## 2025-03-08 RX ADMIN — MUPIROCIN 1 APPLICATION: 20 OINTMENT TOPICAL at 21:44

## 2025-03-08 RX ADMIN — MIDODRINE HYDROCHLORIDE 15 MG: 5 TABLET ORAL at 21:37

## 2025-03-08 RX ADMIN — Medication 10 ML: at 21:39

## 2025-03-08 RX ADMIN — MIRTAZAPINE 15 MG: 15 TABLET, FILM COATED ORAL at 21:38

## 2025-03-08 RX ADMIN — ACETAMINOPHEN 650 MG: 325 TABLET, FILM COATED ORAL at 03:57

## 2025-03-08 RX ADMIN — APIXABAN 5 MG: 5 TABLET, FILM COATED ORAL at 08:14

## 2025-03-08 RX ADMIN — TACROLIMUS 0.5 MG: 0.5 CAPSULE ORAL at 11:28

## 2025-03-08 RX ADMIN — METOPROLOL SUCCINATE 12.5 MG: 25 TABLET, EXTENDED RELEASE ORAL at 08:14

## 2025-03-08 RX ADMIN — POLYETHYLENE GLYCOL 3350 17 G: 17 POWDER, FOR SOLUTION ORAL at 08:12

## 2025-03-08 RX ADMIN — MUPIROCIN 1 APPLICATION: 20 OINTMENT TOPICAL at 08:14

## 2025-03-08 RX ADMIN — FUROSEMIDE 80 MG: 40 TABLET ORAL at 08:13

## 2025-03-08 RX ADMIN — HYDROXYZINE HYDROCHLORIDE 25 MG: 25 TABLET, FILM COATED ORAL at 08:48

## 2025-03-08 RX ADMIN — SENNOSIDES AND DOCUSATE SODIUM 1 TABLET: 50; 8.6 TABLET ORAL at 08:14

## 2025-03-08 RX ADMIN — MIDODRINE HYDROCHLORIDE 15 MG: 5 TABLET ORAL at 05:52

## 2025-03-08 RX ADMIN — MIDODRINE HYDROCHLORIDE 15 MG: 5 TABLET ORAL at 14:32

## 2025-03-08 RX ADMIN — HYDROCODONE BITARTRATE AND ACETAMINOPHEN 1 TABLET: 10; 325 TABLET ORAL at 17:36

## 2025-03-08 RX ADMIN — APIXABAN 5 MG: 5 TABLET, FILM COATED ORAL at 21:37

## 2025-03-08 RX ADMIN — TACROLIMUS 0.5 MG: 0.5 CAPSULE ORAL at 21:44

## 2025-03-08 RX ADMIN — Medication 1000 MCG: at 08:14

## 2025-03-08 RX ADMIN — HYDROCODONE BITARTRATE AND ACETAMINOPHEN 1 TABLET: 10; 325 TABLET ORAL at 11:28

## 2025-03-08 RX ADMIN — HYDROCODONE BITARTRATE AND ACETAMINOPHEN 1 TABLET: 10; 325 TABLET ORAL at 05:52

## 2025-03-08 RX ADMIN — DULOXETINE 40 MG: 20 CAPSULE, DELAYED RELEASE ORAL at 08:48

## 2025-03-08 RX ADMIN — SALINE NASAL SPRAY 2 SPRAY: 1.5 SOLUTION NASAL at 21:38

## 2025-03-08 RX ADMIN — HYDROXYZINE HYDROCHLORIDE 25 MG: 25 TABLET, FILM COATED ORAL at 17:36

## 2025-03-08 NOTE — PLAN OF CARE
Problem: Adult Inpatient Plan of Care  Goal: Optimal Comfort and Wellbeing  Outcome: Progressing     Problem: Adult Inpatient Plan of Care  Goal: Readiness for Transition of Care  Outcome: Progressing     Problem: Comorbidity Management  Goal: Maintenance of Osteoarthritis Symptom Control  Outcome: Progressing  Intervention: Maintain Osteoarthritis Symptom Control  Recent Flowsheet Documentation  Taken 3/8/2025 0518 by Nancy Graff RN  Medication Review/Management: medications reviewed     Problem: Skin Injury Risk Increased  Goal: Skin Health and Integrity  Outcome: Progressing  Intervention: Optimize Skin Protection  Recent Flowsheet Documentation  Taken 3/8/2025 0518 by Nancy Graff RN  Pressure Reduction Techniques:   frequent weight shift encouraged   weight shift assistance provided   positioned off wounds   pressure points protected   heels elevated off bed  Pressure Reduction Devices:   pressure-redistributing mattress utilized   heel offloading device utilized  Skin Protection:   incontinence pads utilized   skin sealant/moisture barrier applied   protective footwear used     Problem: Fall Injury Risk  Goal: Absence of Fall and Fall-Related Injury  Outcome: Progressing  Intervention: Identify and Manage Contributors  Recent Flowsheet Documentation  Taken 3/8/2025 0518 by Nancy Graff RN  Medication Review/Management: medications reviewed  Intervention: Promote Injury-Free Environment  Recent Flowsheet Documentation  Taken 3/8/2025 0518 by Nancy Graff RN  Safety Promotion/Fall Prevention: safety round/check completed     Problem: Sepsis/Septic Shock  Goal: Optimal Coping  Outcome: Progressing  Goal: Absence of Bleeding  Outcome: Progressing  Goal: Blood Glucose Level Within Target Range  Outcome: Progressing  Goal: Absence of Infection Signs and Symptoms  Outcome: Progressing  Intervention: Initiate Sepsis Management  Recent Flowsheet Documentation  Taken 3/8/2025 0518 by Prerna  Nancy BARROW RN  Infection Prevention:   single patient room provided   rest/sleep promoted   personal protective equipment utilized   hand hygiene promoted  Isolation Precautions:   precautions maintained   contact   droplet  Goal: Optimal Nutrition Delivery  Outcome: Progressing   Goal Outcome Evaluation:  Plan of Care Reviewed With: patient        Progress: improving

## 2025-03-08 NOTE — PROGRESS NOTES
Name: Jaja Carcamo  Age: 80 y.o.  : 1945  Sex: female    27    Subjective  Patient well.  No complaints.     Interval History   No acute events overnight.      Objective:    Vital Signs  Temp:  [96.9 °F (36.1 °C)-98.3 °F (36.8 °C)] 97.7 °F (36.5 °C)  Heart Rate:  [] 106  Resp:  [14-21] 19  BP: ()/(46-99) 106/54        Intake/Output Summary (Last 24 hours) at 3/8/2025 1550  Last data filed at 3/8/2025 0500  Gross per 24 hour   Intake 240 ml   Output 1300 ml   Net -1060 ml           Physical Exam  Physical Exam  Constitutional:       General: She is not in acute distress.     Appearance: She is well-developed. She is not diaphoretic.   HENT:      Head: Normocephalic and atraumatic.      Nose: Nose normal.   Eyes:      General:         Right eye: No discharge.         Left eye: No discharge.      Conjunctiva/sclera: Conjunctivae normal.      Pupils: Pupils are equal, round, and reactive to light.   Neck:      Thyroid: No thyromegaly.      Vascular: No JVD.      Trachea: No tracheal deviation.   Cardiovascular:      Rate and Rhythm: Normal rate and regular rhythm.      Heart sounds: Normal heart sounds. No murmur heard.     No friction rub. No gallop.   Pulmonary:      Effort: Pulmonary effort is normal. No respiratory distress.      Breath sounds: Normal breath sounds. No stridor. No wheezing or rales.   Chest:      Chest wall: No tenderness.   Abdominal:      General: Bowel sounds are normal. There is no distension.      Palpations: Abdomen is soft. There is no mass.      Tenderness: There is no abdominal tenderness. There is no guarding or rebound.   Musculoskeletal:         General: No tenderness or deformity. Normal range of motion.      Cervical back: Normal range of motion and neck supple.      Right lower leg: Edema present.      Left lower leg: Edema present.   Lymphadenopathy:      Cervical: No cervical adenopathy.   Skin:     General: Skin is warm and dry.      Coloration: Skin is  not pale.      Findings: No erythema or rash.   Neurological:      Mental Status: She is alert and oriented to person, place, and time.      Cranial Nerves: No cranial nerve deficit.      Motor: No abnormal muscle tone.      Coordination: Coordination normal.      Deep Tendon Reflexes: Reflexes normal.   Psychiatric:         Behavior: Behavior normal.         Thought Content: Thought content normal.         Judgment: Judgment normal.             Results Review:      Results from last 7 days   Lab Units 03/08/25  0544 03/07/25  0541 03/06/25  0907 03/05/25  0744 03/04/25  0549   SODIUM mmol/L 136 136 136 132* 140   CO2 mmol/L 26.1 28.0 30.6* 26.8 28.3   BUN mg/dL 48* 50* 51* 51* 48*   CREATININE mg/dL 1.19* 1.41* 1.75* 1.91* 1.84*   CALCIUM mg/dL 8.9 9.7 9.8 9.5 9.5   ALBUMIN g/dL 2.9* 3.1* 3.2* 3.1* 3.7   AST (SGOT) U/L 22 13 15 13 18   ALT (SGPT) U/L <5 <5 <5 <5 5   EGFR mL/min/1.73 46.9* 38.3* 29.5* 26.6* 27.8*       Results from last 7 days   Lab Units 03/08/25  0544 03/07/25  0541 03/06/25  0907 03/05/25  0744 03/04/25  0124 03/03/25  1712   WBC 10*3/mm3 7.03 8.45 7.20 8.11 8.18 9.73       Imaging studies: I personally reviewed the patient's most recent pertinent imaging studies       Medication Review:   apixaban, 5 mg, Oral, Q12H  DULoxetine, 40 mg, Oral, Daily  furosemide, 80 mg, Oral, BID Diuretics  HYDROcodone-acetaminophen, 1 tablet, Oral, Q6H  levothyroxine, 137 mcg, Oral, Q AM  metoprolol succinate XL, 12.5 mg, Oral, Q24H  midodrine, 15 mg, Oral, Q8H  mirtazapine, 15 mg, Oral, Nightly  mupirocin, 1 Application, Each Nare, BID  polyethylene glycol, 17 g, Oral, Daily  sennosides-docusate, 1 tablet, Oral, BID  sodium chloride, 10 mL, Intravenous, Q12H  sodium chloride, 10 mL, Intravenous, Q12H  tacrolimus, 0.5 mg, Oral, BID  vitamin B-12, 1,000 mcg, Oral, Daily          Assessment  TONYA chronic kidney disease increasing creatinine baseline likely secondary to hypotension from sepsis  Cadaver kidney  Transplant 2007  UTI  Fluid overload  Hypotension  Viral URI  RUE swelling - AVF with thrill and bruit  Anemia      Plan:  Blood pressure is overall fairly stable.  Will continue midodrine.    Renal function improved.  Creatinine is 1.17 sodium 136 potassium 4.7.    Clinically still has some volume excess.  Continue current dose of Lasix.    Continue immunosuppression with Prograf.    Monitor renal function closely..      Konrad Osorio MD  03/08/25  15:50 EST  Tel: [phone]  Fax: [phone]

## 2025-03-08 NOTE — CASE MANAGEMENT/SOCIAL WORK
Continued Stay Note  DAHIANA Gay     Patient Name: Jaja Carcamo  MRN: [id number]  Today's Date: 3/8/2025    Admit Date: 3/3/2025    Plan: Plan to return to [facility]. Precert required to return as SNF, started by [facility] 3/7, pending (can return Pending). No PASRR required.   Discharge Plan       Row Name 03/08/25 1523       Plan    Plan Comments Pre-cert still pending as of 3/8.             Tiana Marie RN     Office: [phone]  Fax: [phone]

## 2025-03-08 NOTE — PROGRESS NOTES
LOS: 5 days   Patient Care Team:  Kvng Guillen MD as PCP - General (Family Medicine)  Jak Gavin MD as Cardiologist (Cardiology)    Subjective     Interval History: stable    Patient Complaints: No voiced complaints    History taken from: patient    Review of Systems   Constitutional:  Positive for activity change and fatigue.   HENT:  Negative for trouble swallowing.    Eyes:  Negative for visual disturbance.   Respiratory:  Positive for shortness of breath. Negative for cough and wheezing.    Cardiovascular:  Positive for leg swelling. Negative for chest pain and palpitations.   Gastrointestinal:  Negative for abdominal pain.   Genitourinary:  Negative for difficulty urinating.   Musculoskeletal:  Positive for gait problem.   Neurological:  Positive for weakness.   Psychiatric/Behavioral:  Negative for confusion.            Objective     Vital Signs  Temp:  [96.9 °F (36.1 °C)-98.3 °F (36.8 °C)] 97.7 °F (36.5 °C)  Heart Rate:  [] 106  Resp:  [14-21] 19  BP: ()/(46-99) 106/54    Physical Exam:     General Appearance:    Alert, cooperative, in no acute distress,   Head:    Normocephalic, without obvious abnormality, atraumatic   Eyes:            Lids and lashes normal, conjunctivae and sclerae normal, no   icterus, no pallor, corneas clear   Ears:    Ears appear intact with no abnormalities noted   Throat:   No oral lesions, no thrush, oral mucosa moist   Neck:   No adenopathy, supple, trachea midline, no thyromegaly, no   carotid bruit, no JVD   Lungs:     Clear to auscultation,respirations regular, even and unlabored    Heart:    Regular rhythm and normal rate, normal S1 and S2, no   murmur, no gallop, no rub, no click   Chest Wall:    No abnormalities observed   Abdomen:     Normal bowel sounds, no masses, no organomegaly, soft Non-tender non-distended, no guarding,   Extremities:   Bilateral lower extremity edema. Moves all extremities well, no cyanosis   Pulses:   Pulses palpable and  equal bilaterally   Skin:   No bleeding, bruising or rash   Lymph nodes:   No palpable adenopathy            Results Review:    Lab Results (last 24 hours)       Procedure Component Value Units Date/Time    Comprehensive Metabolic Panel [[id number]]  (Abnormal) Collected: 03/08/25 0544    Specimen: Blood Updated: 03/08/25 0714     Glucose 76 mg/dL      BUN 48 mg/dL      Creatinine 1.19 mg/dL      Sodium 136 mmol/L      Potassium 4.7 mmol/L      Comment: Slight hemolysis detected by analyzer. Result may be falsely elevated.        Chloride 101 mmol/L      CO2 26.1 mmol/L      Calcium 8.9 mg/dL      Total Protein 5.9 g/dL      Albumin 2.9 g/dL      ALT (SGPT) <5 U/L      AST (SGOT) 22 U/L      Comment: Slight hemolysis detected by analyzer. Result may be falsely elevated.        Alkaline Phosphatase 70 U/L      Total Bilirubin 0.4 mg/dL      Globulin 3.0 gm/dL      A/G Ratio 1.0 g/dL      BUN/Creatinine Ratio 40.3     Anion Gap 8.9 mmol/L      eGFR 46.9 mL/min/1.73     Narrative:      GFR Categories in Chronic Kidney Disease (CKD)      GFR Category          GFR (mL/min/1.73)    Interpretation  G1                     90 or greater         Normal or high (1)  G2                      60-89                Mild decrease (1)  G3a                   45-59                Mild to moderate decrease  G3b                   30-44                Moderate to severe decrease  G4                    15-29                Severe decrease  G5                    14 or less           Kidney failure          (1)In the absence of evidence of kidney disease, neither GFR category G1 or G2 fulfill the criteria for CKD.    eGFR calculation 2021 CKD-EPI creatinine equation, which does not include race as a factor    Magnesium [[id number]]  (Normal) Collected: 03/08/25 0544    Specimen: Blood Updated: 03/08/25 0713     Magnesium 1.8 mg/dL     Phosphorus [[id number]]  (Normal) Collected: 03/08/25 0544    Specimen: Blood Updated: 03/08/25 0710      Phosphorus 3.2 mg/dL     CBC & Differential [[id number]]  (Abnormal) Collected: 03/08/25 0544    Specimen: Blood Updated: 03/08/25 0645    Narrative:      The following orders were created for panel order CBC & Differential.  Procedure                               Abnormality         Status                     ---------                               -----------         ------                     CBC Auto Differential[[id number]]        Abnormal            Final result                 Please view results for these tests on the individual orders.    CBC Auto Differential [[id number]]  (Abnormal) Collected: 03/08/25 0544    Specimen: Blood Updated: 03/08/25 0645     WBC 7.03 10*3/mm3      RBC 2.80 10*6/mm3      Hemoglobin 8.3 g/dL      Hematocrit 28.4 %      .4 fL      MCH 29.6 pg      MCHC 29.2 g/dL      RDW 18.6 %      RDW-SD 67.4 fl      MPV 11.0 fL      Platelets 132 10*3/mm3      Neutrophil % 67.7 %      Lymphocyte % 13.7 %      Monocyte % 10.1 %      Eosinophil % 7.7 %      Basophil % 0.4 %      Immature Grans % 0.4 %      Neutrophils, Absolute 4.76 10*3/mm3      Lymphocytes, Absolute 0.96 10*3/mm3      Monocytes, Absolute 0.71 10*3/mm3      Eosinophils, Absolute 0.54 10*3/mm3      Basophils, Absolute 0.03 10*3/mm3      Immature Grans, Absolute 0.03 10*3/mm3      nRBC 0.0 /100 WBC     Tacrolimus Level [[id number]] Collected: 03/04/25 0124    Specimen: Blood from Arm, Right Updated: 03/08/25 0609     Tacrolimus 5.2 ng/mL      Comment: Target steady state trough concentration for  Tacrolimus varies based on type of organ transplant  immunosuppressive protocol and other patient specific  factors.  Tacrolimus trough concentrations should be  interpreted in conjunction with clinical assessments  of rejection and tolerability.  Values obtained with  different assay methods cannot be used interchangeably  due to differences in assay methods and cross-reactivty  with metabolites, nor should correction factors  be  applied.  Therefore, consistent use of one assay for  individual patients is recommended.  Detection Limit = 0.5 ng/mL  Performed by LC-MS/MS technology              **Please note reference interval change**       Narrative:      Test(s) 700964-Tacrolimus (), Blood  was developed and its performance characteristics determined  by [facility]. It has not been cleared or approved by the Food  and Drug Administration.  Performed at:  [address] [facility]  [id number]  : Star Flores MD, Phone:  [phone]    Blood Culture - Blood, Arm, Left [[id number]]  (Normal) Collected: 03/03/25 1716    Specimen: Blood from Arm, Left Updated: 03/07/25 1731     Blood Culture No growth at 4 days    Blood Culture - Blood, Arm, Left [[id number]]  (Normal) Collected: 03/03/25 1716    Specimen: Blood from Arm, Left Updated: 03/07/25 1731     Blood Culture No growth at 4 days    Narrative:      Less than seven (7) mL's of blood was collected.  Insufficient quantity may yield false negative results.             Imaging Results (Last 24 Hours)       ** No results found for the last 24 hours. **                 I reviewed the patient's new clinical results.    Medication Review:   Scheduled Meds:apixaban, 5 mg, Oral, Q12H  DULoxetine, 40 mg, Oral, Daily  furosemide, 80 mg, Oral, BID Diuretics  HYDROcodone-acetaminophen, 1 tablet, Oral, Q6H  levothyroxine, 137 mcg, Oral, Q AM  metoprolol succinate XL, 12.5 mg, Oral, Q24H  midodrine, 15 mg, Oral, Q8H  mirtazapine, 15 mg, Oral, Nightly  mupirocin, 1 Application, Each Nare, BID  polyethylene glycol, 17 g, Oral, Daily  sennosides-docusate, 1 tablet, Oral, BID  sodium chloride, 10 mL, Intravenous, Q12H  sodium chloride, 10 mL, Intravenous, Q12H  tacrolimus, 0.5 mg, Oral, BID  vitamin B-12, 1,000 mcg, Oral, Daily      Continuous Infusions:   PRN Meds:.  acetaminophen **OR** acetaminophen    albuterol    aluminum-magnesium hydroxide-simethicone     senna-docusate sodium **AND** polyethylene glycol **AND** bisacodyl **AND** bisacodyl    hydrOXYzine    [Held by provider] LORazepam    nitroglycerin    ondansetron ODT **OR** ondansetron    prochlorperazine    [COMPLETED] Insert Peripheral IV **AND** sodium chloride    sodium chloride    sodium chloride    sodium chloride    sodium chloride    sodium chloride     Assessment & Plan       UTI (urinary tract infection)    COPD (chronic obstructive pulmonary disease)    Hypothyroidism, unspecified    History of kidney transplant    Long term (current) use of immunosuppressive biologic    Long term current use of anticoagulant therapy    History of DVT (deep vein thrombosis)    Pulmonary hypertension    Decubitus ulcer of foot, stage 3    Edema of both lower extremities due to peripheral venous insufficiency    Mixed anxiety and depressive disorder    Essential (primary) hypertension    Paroxysmal atrial fibrillation    Acute exacerbation of CHF (congestive heart failure)    End stage renal disease    Edema of right upper arm    TONYA (acute kidney injury)    Pressure injury of right heel, stage 3      Urine culture was negative for infection. She remains afebrile without leukocytosis. Rocephin was discontinued. Nephrology has been following her for diuresis and IV lasix today was changed to PO lasix in hopes to transition back to [facility]. She has a working fistula if HD necessary.    Plan for disposition:Return to [facility]    Laureen Deleon PA-C  03/08/25  15:29 EST

## 2025-03-08 NOTE — NURSING NOTE
Patient transferred from ICU this am.  Patient remains on O2 at 4L.  Skin bruised and excoriated in places.  Compression wraps to bilateral legs and feet.  Dsg change due later this afternoon, per [facility] note.

## 2025-03-08 NOTE — PLAN OF CARE
Goal Outcome Evaluation:   Pt has scheduled pain meds q6hr. did wound care dressing changes on bilateral legs and feet. Took pictures and documented correctly. Updated skin sheets.

## 2025-03-09 LAB
ALBUMIN SERPL-MCNC: 3 G/DL (ref 3.5–5.2)
ALBUMIN/GLOB SERPL: 1 G/DL
ALP SERPL-CCNC: 74 U/L (ref 39–117)
ALT SERPL W P-5'-P-CCNC: <5 U/L (ref 1–33)
ANION GAP SERPL CALCULATED.3IONS-SCNC: 9 MMOL/L (ref 5–15)
AST SERPL-CCNC: 13 U/L (ref 1–32)
BASOPHILS # BLD AUTO: 0.04 10*3/MM3 (ref 0–0.2)
BASOPHILS NFR BLD AUTO: 0.5 % (ref 0–1.5)
BILIRUB SERPL-MCNC: 0.5 MG/DL (ref 0–1.2)
BUN SERPL-MCNC: 48 MG/DL (ref 8–23)
BUN/CREAT SERPL: 42.1 (ref 7–25)
CALCIUM SPEC-SCNC: 9.5 MG/DL (ref 8.6–10.5)
CHLORIDE SERPL-SCNC: 97 MMOL/L (ref 98–107)
CO2 SERPL-SCNC: 29 MMOL/L (ref 22–29)
CREAT SERPL-MCNC: 1.14 MG/DL (ref 0.57–1)
DEPRECATED RDW RBC AUTO: 65.5 FL (ref 37–54)
EGFRCR SERPLBLD CKD-EPI 2021: 49.4 ML/MIN/1.73
EOSINOPHIL # BLD AUTO: 0.6 10*3/MM3 (ref 0–0.4)
EOSINOPHIL NFR BLD AUTO: 8.1 % (ref 0.3–6.2)
ERYTHROCYTE [DISTWIDTH] IN BLOOD BY AUTOMATED COUNT: 17.9 % (ref 12.3–15.4)
GLOBULIN UR ELPH-MCNC: 2.9 GM/DL
GLUCOSE SERPL-MCNC: 131 MG/DL (ref 65–99)
HCT VFR BLD AUTO: 27.6 % (ref 34–46.6)
HGB BLD-MCNC: 8 G/DL (ref 12–15.9)
IMM GRANULOCYTES # BLD AUTO: 0.04 10*3/MM3 (ref 0–0.05)
IMM GRANULOCYTES NFR BLD AUTO: 0.5 % (ref 0–0.5)
LYMPHOCYTES # BLD AUTO: 1.18 10*3/MM3 (ref 0.7–3.1)
LYMPHOCYTES NFR BLD AUTO: 15.8 % (ref 19.6–45.3)
MAGNESIUM SERPL-MCNC: 1.9 MG/DL (ref 1.6–2.4)
MCH RBC QN AUTO: 29.3 PG (ref 26.6–33)
MCHC RBC AUTO-ENTMCNC: 29 G/DL (ref 31.5–35.7)
MCV RBC AUTO: 101.1 FL (ref 79–97)
MONOCYTES # BLD AUTO: 0.75 10*3/MM3 (ref 0.1–0.9)
MONOCYTES NFR BLD AUTO: 10.1 % (ref 5–12)
NEUTROPHILS NFR BLD AUTO: 4.84 10*3/MM3 (ref 1.7–7)
NEUTROPHILS NFR BLD AUTO: 65 % (ref 42.7–76)
NRBC BLD AUTO-RTO: 0 /100 WBC (ref 0–0.2)
PHOSPHATE SERPL-MCNC: 3.8 MG/DL (ref 2.5–4.5)
PLATELET # BLD AUTO: 148 10*3/MM3 (ref 140–450)
PMV BLD AUTO: 9.2 FL (ref 6–12)
POTASSIUM SERPL-SCNC: 4.4 MMOL/L (ref 3.5–5.2)
PROT SERPL-MCNC: 5.9 G/DL (ref 6–8.5)
RBC # BLD AUTO: 2.73 10*6/MM3 (ref 3.77–5.28)
SODIUM SERPL-SCNC: 135 MMOL/L (ref 136–145)
WBC NRBC COR # BLD AUTO: 7.45 10*3/MM3 (ref 3.4–10.8)

## 2025-03-09 PROCEDURE — 84100 ASSAY OF PHOSPHORUS: CPT

## 2025-03-09 PROCEDURE — 63710000001 TACROLIMUS PER 1 MG: Performed by: INTERNAL MEDICINE

## 2025-03-09 PROCEDURE — 83735 ASSAY OF MAGNESIUM: CPT

## 2025-03-09 PROCEDURE — 94799 UNLISTED PULMONARY SVC/PX: CPT

## 2025-03-09 PROCEDURE — 85025 COMPLETE CBC W/AUTO DIFF WBC: CPT

## 2025-03-09 PROCEDURE — 80053 COMPREHEN METABOLIC PANEL: CPT

## 2025-03-09 PROCEDURE — 94664 DEMO&/EVAL PT USE INHALER: CPT

## 2025-03-09 PROCEDURE — 94761 N-INVAS EAR/PLS OXIMETRY MLT: CPT

## 2025-03-09 RX ORDER — HYDROCODONE BITARTRATE AND ACETAMINOPHEN 10; 325 MG/1; MG/1
1 TABLET ORAL EVERY 6 HOURS
Refills: 0 | Status: COMPLETED | OUTPATIENT
Start: 2025-03-09 | End: 2025-03-12

## 2025-03-09 RX ADMIN — ALBUTEROL SULFATE 0.63 MG: 0.63 SOLUTION RESPIRATORY (INHALATION) at 13:11

## 2025-03-09 RX ADMIN — Medication 1000 MCG: at 08:26

## 2025-03-09 RX ADMIN — MIDODRINE HYDROCHLORIDE 15 MG: 5 TABLET ORAL at 16:25

## 2025-03-09 RX ADMIN — HYDROXYZINE HYDROCHLORIDE 25 MG: 25 TABLET, FILM COATED ORAL at 00:15

## 2025-03-09 RX ADMIN — HYDROCODONE BITARTRATE AND ACETAMINOPHEN 1 TABLET: 10; 325 TABLET ORAL at 00:15

## 2025-03-09 RX ADMIN — Medication 10 ML: at 21:09

## 2025-03-09 RX ADMIN — HYDROCODONE BITARTRATE AND ACETAMINOPHEN 1 TABLET: 10; 325 TABLET ORAL at 05:55

## 2025-03-09 RX ADMIN — Medication 10 ML: at 08:21

## 2025-03-09 RX ADMIN — APIXABAN 5 MG: 5 TABLET, FILM COATED ORAL at 08:27

## 2025-03-09 RX ADMIN — FUROSEMIDE 80 MG: 40 TABLET ORAL at 17:53

## 2025-03-09 RX ADMIN — HYDROCODONE BITARTRATE AND ACETAMINOPHEN 1 TABLET: 10; 325 TABLET ORAL at 21:25

## 2025-03-09 RX ADMIN — DULOXETINE 40 MG: 20 CAPSULE, DELAYED RELEASE ORAL at 08:26

## 2025-03-09 RX ADMIN — HYDROXYZINE HYDROCHLORIDE 25 MG: 25 TABLET, FILM COATED ORAL at 23:44

## 2025-03-09 RX ADMIN — ACETAMINOPHEN 650 MG: 325 TABLET, FILM COATED ORAL at 12:56

## 2025-03-09 RX ADMIN — SENNOSIDES AND DOCUSATE SODIUM 1 TABLET: 50; 8.6 TABLET ORAL at 08:26

## 2025-03-09 RX ADMIN — FUROSEMIDE 80 MG: 40 TABLET ORAL at 08:26

## 2025-03-09 RX ADMIN — MIDODRINE HYDROCHLORIDE 15 MG: 5 TABLET ORAL at 21:08

## 2025-03-09 RX ADMIN — HYDROXYZINE HYDROCHLORIDE 25 MG: 25 TABLET, FILM COATED ORAL at 16:25

## 2025-03-09 RX ADMIN — ACETAMINOPHEN 650 MG: 325 TABLET, FILM COATED ORAL at 17:53

## 2025-03-09 RX ADMIN — LEVOTHYROXINE SODIUM 137 MCG: 0.03 TABLET ORAL at 05:55

## 2025-03-09 RX ADMIN — TACROLIMUS 0.5 MG: 0.5 CAPSULE ORAL at 21:08

## 2025-03-09 RX ADMIN — MIDODRINE HYDROCHLORIDE 15 MG: 5 TABLET ORAL at 05:55

## 2025-03-09 RX ADMIN — HYDROXYZINE HYDROCHLORIDE 25 MG: 25 TABLET, FILM COATED ORAL at 08:27

## 2025-03-09 RX ADMIN — POLYETHYLENE GLYCOL 3350 17 G: 17 POWDER, FOR SOLUTION ORAL at 08:21

## 2025-03-09 RX ADMIN — METOPROLOL SUCCINATE 12.5 MG: 25 TABLET, EXTENDED RELEASE ORAL at 08:27

## 2025-03-09 RX ADMIN — Medication 10 ML: at 08:31

## 2025-03-09 RX ADMIN — APIXABAN 5 MG: 5 TABLET, FILM COATED ORAL at 21:08

## 2025-03-09 RX ADMIN — TACROLIMUS 0.5 MG: 0.5 CAPSULE ORAL at 12:49

## 2025-03-09 RX ADMIN — MIRTAZAPINE 15 MG: 15 TABLET, FILM COATED ORAL at 21:08

## 2025-03-09 NOTE — NURSING NOTE
Patient refusing to be placed on specialty bed, turns, and lab work at this time.  Will try again later.

## 2025-03-09 NOTE — PLAN OF CARE
Goal Outcome Evaluation:              Outcome Evaluation: pt Has slept through out the night.  Refusing specialty bed and labs.

## 2025-03-09 NOTE — PLAN OF CARE
"Goal Outcome Evaluation:   Pt early this morning was refusing everything. After breakfast pt became much more compliment with encouragement from family. Pt let me take labs and get on specialty bed. Pts chaitanya was DC, I have been giving tylenol q4hr till MD schmitz. Pt stated at 1230 that she was \"struggling to breath\" gave breathing treat ment and sat her up. Have been giving anxiety pill q8hr. Pts granddaughter came to visit.                                          "

## 2025-03-09 NOTE — PROGRESS NOTES
Name: Jaja Carcamo  Age: 80 y.o.  : 1945  Sex: female    27    Subjective  Patient complains of anxiety on and off.     Interval History   No acute events overnight.      Objective:    Vital Signs  Temp:  [97.2 °F (36.2 °C)-98 °F (36.7 °C)] 98 °F (36.7 °C)  Heart Rate:  [] 106  Resp:  [14-20] 20  BP: ()/(54-87) 107/87        Intake/Output Summary (Last 24 hours) at 3/9/2025 4649  Last data filed at 3/9/2025 0900  Gross per 24 hour   Intake 120 ml   Output --   Net 120 ml           Physical Exam  Physical Exam  Constitutional:       General: She is not in acute distress.     Appearance: She is well-developed. She is not diaphoretic.   HENT:      Head: Normocephalic and atraumatic.      Nose: Nose normal.   Eyes:      General:         Right eye: No discharge.         Left eye: No discharge.      Conjunctiva/sclera: Conjunctivae normal.      Pupils: Pupils are equal, round, and reactive to light.   Neck:      Thyroid: No thyromegaly.      Vascular: No JVD.      Trachea: No tracheal deviation.   Cardiovascular:      Rate and Rhythm: Normal rate and regular rhythm.      Heart sounds: Normal heart sounds. No murmur heard.     No friction rub. No gallop.   Pulmonary:      Effort: Pulmonary effort is normal. No respiratory distress.      Breath sounds: Normal breath sounds. No stridor. No wheezing or rales.   Chest:      Chest wall: No tenderness.   Abdominal:      General: Bowel sounds are normal. There is no distension.      Palpations: Abdomen is soft. There is no mass.      Tenderness: There is no abdominal tenderness. There is no guarding or rebound.   Musculoskeletal:         General: No tenderness or deformity. Normal range of motion.      Cervical back: Normal range of motion and neck supple.      Right lower leg: Edema present.      Left lower leg: Edema present.   Lymphadenopathy:      Cervical: No cervical adenopathy.   Skin:     General: Skin is warm and dry.      Coloration: Skin is  not pale.      Findings: No erythema or rash.   Neurological:      Mental Status: She is alert and oriented to person, place, and time.      Cranial Nerves: No cranial nerve deficit.      Motor: No abnormal muscle tone.      Coordination: Coordination normal.      Deep Tendon Reflexes: Reflexes normal.   Psychiatric:         Behavior: Behavior normal.         Thought Content: Thought content normal.         Judgment: Judgment normal.             Results Review:      Results from last 7 days   Lab Units 03/09/25  1003 03/08/25  0544 03/07/25  0541 03/06/25  0907 03/05/25  0744   SODIUM mmol/L 135* 136 136 136 132*   CO2 mmol/L 29.0 26.1 28.0 30.6* 26.8   BUN mg/dL 48* 48* 50* 51* 51*   CREATININE mg/dL 1.14* 1.19* 1.41* 1.75* 1.91*   CALCIUM mg/dL 9.5 8.9 9.7 9.8 9.5   ALBUMIN g/dL 3.0* 2.9* 3.1* 3.2* 3.1*   AST (SGOT) U/L 13 22 13 15 13   ALT (SGPT) U/L <5 <5 <5 <5 <5   EGFR mL/min/1.73 49.4* 46.9* 38.3* 29.5* 26.6*       Results from last 7 days   Lab Units 03/09/25  1003 03/08/25  0544 03/07/25  0541 03/06/25  0907 03/05/25  0744 03/04/25  0124 03/03/25  1712   WBC 10*3/mm3 7.45 7.03 8.45 7.20 8.11 8.18 9.73       Imaging studies: I personally reviewed the patient's most recent pertinent imaging studies       Medication Review:   apixaban, 5 mg, Oral, Q12H  DULoxetine, 40 mg, Oral, Daily  furosemide, 80 mg, Oral, BID Diuretics  levothyroxine, 137 mcg, Oral, Q AM  metoprolol succinate XL, 12.5 mg, Oral, Q24H  midodrine, 15 mg, Oral, Q8H  mirtazapine, 15 mg, Oral, Nightly  polyethylene glycol, 17 g, Oral, Daily  sennosides-docusate, 1 tablet, Oral, BID  sodium chloride, 10 mL, Intravenous, Q12H  sodium chloride, 10 mL, Intravenous, Q12H  tacrolimus, 0.5 mg, Oral, BID  vitamin B-12, 1,000 mcg, Oral, Daily          Assessment  TONYA chronic kidney disease increasing creatinine baseline likely secondary to hypotension from sepsis  [facility] kidney Transplant 2007  UTI  Fluid overload  Hypotension  Viral URI  RUE swelling -  AVF with thrill and bruit  Anemia      Plan:  Patient with tenderness from low blood pressures.  Continue midodrine..    Renal function stable creatinine is 1.1 Simcere is 135 potassium is 4.4.    Clinically still has  volume excess.  Low albumin also contributory to edema.  Continue current dose of Lasix.    Continue immunosuppression with Prograf.    Monitor renal function closely..      Konrad Osorio MD  03/09/25  16:29 EDT  Tel: [phone]  Fax: [phone]

## 2025-03-09 NOTE — CASE MANAGEMENT/SOCIAL WORK
Continued Stay Note  [facility]     Patient Name: Jaja Carcamo  MRN: [id number]  Today's Date: 3/9/2025    Admit Date: 3/3/2025    Plan: Plan to return to [facility]. No PASRR required. Pre-cert required to return as SNF - initiated 3/7 - Pending as of 3/9. (Can return pending).   Discharge Plan       Row Name 03/09/25 1533       Plan    Plan Plan to return to [facility]. No PASRR required. Pre-cert required to return as SNF - initiated 3/7 - Pending as of 3/9. (Can return pending).             Tiana Marie RN    Office: [phone]  Fax: [phone]

## 2025-03-09 NOTE — PROGRESS NOTES
LOS: 6 days   Patient Care Team:  Kvng Guillen MD as PCP - General (Family Medicine)  Jak Gavin MD as Cardiologist (Cardiology)    Subjective     Interval History: stable    Patient Complaints: No voiced complaints, refusing labs this morning, somewhat agitated    History taken from: patient    Review of Systems   Constitutional:  Positive for activity change and fatigue.   HENT:  Negative for trouble swallowing.    Eyes:  Negative for visual disturbance.   Respiratory:  Negative for cough, shortness of breath and wheezing.    Cardiovascular:  Positive for leg swelling. Negative for chest pain and palpitations.   Gastrointestinal:  Negative for abdominal pain.   Endocrine: Negative for polyuria.   Genitourinary:  Negative for difficulty urinating.   Musculoskeletal:  Positive for gait problem.   Neurological:  Positive for weakness.   Psychiatric/Behavioral:  Negative for confusion.            Objective     Vital Signs  Temp:  [97.2 °F (36.2 °C)-98.3 °F (36.8 °C)] 97.2 °F (36.2 °C)  Heart Rate:  [] 103  Resp:  [14-21] 18  BP: ()/(53-69) 96/61    Physical Exam:                   General Appearance:    Alert, cooperative, in no acute distress,   Head:    Normocephalic, without obvious abnormality, atraumatic   Eyes:            Lids and lashes normal, conjunctivae and sclerae normal, no   icterus, no pallor, corneas clear   Ears:    Ears appear intact with no abnormalities noted   Throat:   No oral lesions, no thrush, oral mucosa moist   Neck:   No adenopathy, supple, trachea midline, no thyromegaly, no   carotid bruit, no JVD   Lungs:     Clear to auscultation,respirations regular, even and unlabored    Heart:    Regular rhythm and normal rate, normal S1 and S2, no   murmur, no gallop, no rub, no click   Chest Wall:    No abnormalities observed   Abdomen:     Normal bowel sounds, no masses, no organomegaly, soft Non-tender non-distended, no guarding,   Extremities:   Bilateral lower extremity  edema. Moves all extremities well, no cyanosis   Pulses:   Pulses palpable and equal bilaterally   Skin:   No bleeding, bruising or rash   Lymph nodes:   No palpable adenopathy        Results Review:    Lab Results (last 24 hours)       Procedure Component Value Units Date/Time    Tacrolimus Level [[id number]]  (Abnormal) Collected: 03/05/25 0744    Specimen: Blood Updated: 03/08/25 1809     Tacrolimus 3.9 ng/mL      Comment: Target steady state trough concentration for  Tacrolimus varies based on type of organ transplant  immunosuppressive protocol and other patient specific  factors.  Tacrolimus trough concentrations should be  interpreted in conjunction with clinical assessments  of rejection and tolerability.  Values obtained with  different assay methods cannot be used interchangeably  due to differences in assay methods and cross-reactivty  with metabolites, nor should correction factors be  applied.  Therefore, consistent use of one assay for  individual patients is recommended.  Detection Limit = 0.5 ng/mL  Performed by LC-MS/MS technology              **Please note reference interval change**       Narrative:      Test(s) 700964-Tacrolimus (), Blood  was developed and its performance characteristics determined  by [facility]. It has not been cleared or approved by the Food  and Drug Administration.  Performed at:  [address] - [address]  [id number]  : Star Flores MD, Phone:  [phone]    Blood Culture - Blood, Arm, Left [[id number]]  (Normal) Collected: 03/03/25 1716    Specimen: Blood from Arm, Left Updated: 03/08/25 1731     Blood Culture No growth at 5 days    Blood Culture - Blood, Arm, Left [[id number]]  (Normal) Collected: 03/03/25 1716    Specimen: Blood from Arm, Left Updated: 03/08/25 1731     Blood Culture No growth at 5 days    Narrative:      Less than seven (7) mL's of blood was collected.  Insufficient quantity may yield false negative results.              Imaging Results (Last 24 Hours)       ** No results found for the last 24 hours. **                 I reviewed the patient's new clinical results.    Medication Review:   Scheduled Meds:apixaban, 5 mg, Oral, Q12H  DULoxetine, 40 mg, Oral, Daily  furosemide, 80 mg, Oral, BID Diuretics  HYDROcodone-acetaminophen, 1 tablet, Oral, Q6H  levothyroxine, 137 mcg, Oral, Q AM  metoprolol succinate XL, 12.5 mg, Oral, Q24H  midodrine, 15 mg, Oral, Q8H  mirtazapine, 15 mg, Oral, Nightly  polyethylene glycol, 17 g, Oral, Daily  sennosides-docusate, 1 tablet, Oral, BID  sodium chloride, 10 mL, Intravenous, Q12H  sodium chloride, 10 mL, Intravenous, Q12H  tacrolimus, 0.5 mg, Oral, BID  vitamin B-12, 1,000 mcg, Oral, Daily      Continuous Infusions:   PRN Meds:.  acetaminophen **OR** acetaminophen    albuterol    aluminum-magnesium hydroxide-simethicone    senna-docusate sodium **AND** polyethylene glycol **AND** bisacodyl **AND** bisacodyl    hydrOXYzine    [Held by provider] LORazepam    nitroglycerin    ondansetron ODT **OR** ondansetron    prochlorperazine    [COMPLETED] Insert Peripheral IV **AND** sodium chloride    sodium chloride    sodium chloride    sodium chloride    sodium chloride    sodium chloride     Assessment & Plan       UTI (urinary tract infection)    COPD (chronic obstructive pulmonary disease)    Hypothyroidism, unspecified    History of kidney transplant    Long term (current) use of immunosuppressive biologic    Long term current use of anticoagulant therapy    History of DVT (deep vein thrombosis)    Pulmonary hypertension    Decubitus ulcer of foot, stage 3    Edema of both lower extremities due to peripheral venous insufficiency    Mixed anxiety and depressive disorder    Essential (primary) hypertension    Paroxysmal atrial fibrillation    Acute exacerbation of CHF (congestive heart failure)    End stage renal disease    Edema of right upper arm    TONYA (acute kidney injury)    Pressure injury  of right heel, stage 3      Urine culture was negative for infection. She remains afebrile without leukocytosis. Rocephin was discontinued. Nephrology has been following her for diuresis and IV lasix discontinued was changed to PO lasix in hopes to transition back to [facility]. She has a working fistula if HD necessary. Will encourage labs to be completed this morning. Should be able to return to [facility] by tomorrow.    Plan for disposition:Return to [facility]    Laureen Deleon PA-C  03/09/25  07:27 EDT

## 2025-03-10 LAB
ALBUMIN SERPL-MCNC: 3 G/DL (ref 3.5–5.2)
ALBUMIN/GLOB SERPL: 1.1 G/DL
ALP SERPL-CCNC: 76 U/L (ref 39–117)
ALT SERPL W P-5'-P-CCNC: 5 U/L (ref 1–33)
ANION GAP SERPL CALCULATED.3IONS-SCNC: 9.2 MMOL/L (ref 5–15)
AST SERPL-CCNC: 13 U/L (ref 1–32)
BASOPHILS # BLD AUTO: 0.03 10*3/MM3 (ref 0–0.2)
BASOPHILS NFR BLD AUTO: 0.4 % (ref 0–1.5)
BILIRUB SERPL-MCNC: 0.4 MG/DL (ref 0–1.2)
BUN SERPL-MCNC: 47 MG/DL (ref 8–23)
BUN/CREAT SERPL: 41.2 (ref 7–25)
CALCIUM SPEC-SCNC: 9.3 MG/DL (ref 8.6–10.5)
CHLORIDE SERPL-SCNC: 96 MMOL/L (ref 98–107)
CO2 SERPL-SCNC: 28.8 MMOL/L (ref 22–29)
CREAT SERPL-MCNC: 1.14 MG/DL (ref 0.57–1)
DEPRECATED RDW RBC AUTO: 65.7 FL (ref 37–54)
EGFRCR SERPLBLD CKD-EPI 2021: 49.4 ML/MIN/1.73
EOSINOPHIL # BLD AUTO: 0.48 10*3/MM3 (ref 0–0.4)
EOSINOPHIL NFR BLD AUTO: 6.8 % (ref 0.3–6.2)
ERYTHROCYTE [DISTWIDTH] IN BLOOD BY AUTOMATED COUNT: 17.8 % (ref 12.3–15.4)
GLOBULIN UR ELPH-MCNC: 2.7 GM/DL
GLUCOSE BLDC GLUCOMTR-MCNC: 89 MG/DL (ref 70–105)
GLUCOSE SERPL-MCNC: 103 MG/DL (ref 65–99)
HCT VFR BLD AUTO: 26.3 % (ref 34–46.6)
HGB BLD-MCNC: 7.7 G/DL (ref 12–15.9)
IMM GRANULOCYTES # BLD AUTO: 0.02 10*3/MM3 (ref 0–0.05)
IMM GRANULOCYTES NFR BLD AUTO: 0.3 % (ref 0–0.5)
LYMPHOCYTES # BLD AUTO: 1.41 10*3/MM3 (ref 0.7–3.1)
LYMPHOCYTES NFR BLD AUTO: 20 % (ref 19.6–45.3)
MAGNESIUM SERPL-MCNC: 2 MG/DL (ref 1.6–2.4)
MCH RBC QN AUTO: 29.7 PG (ref 26.6–33)
MCHC RBC AUTO-ENTMCNC: 29.3 G/DL (ref 31.5–35.7)
MCV RBC AUTO: 101.5 FL (ref 79–97)
MONOCYTES # BLD AUTO: 0.79 10*3/MM3 (ref 0.1–0.9)
MONOCYTES NFR BLD AUTO: 11.2 % (ref 5–12)
NEUTROPHILS NFR BLD AUTO: 4.31 10*3/MM3 (ref 1.7–7)
NEUTROPHILS NFR BLD AUTO: 61.3 % (ref 42.7–76)
NRBC BLD AUTO-RTO: 0 /100 WBC (ref 0–0.2)
PHOSPHATE SERPL-MCNC: 3.8 MG/DL (ref 2.5–4.5)
PLATELET # BLD AUTO: 149 10*3/MM3 (ref 140–450)
PMV BLD AUTO: 9.6 FL (ref 6–12)
POTASSIUM SERPL-SCNC: 4.5 MMOL/L (ref 3.5–5.2)
PROT SERPL-MCNC: 5.7 G/DL (ref 6–8.5)
RBC # BLD AUTO: 2.59 10*6/MM3 (ref 3.77–5.28)
SODIUM SERPL-SCNC: 134 MMOL/L (ref 136–145)
WBC NRBC COR # BLD AUTO: 7.04 10*3/MM3 (ref 3.4–10.8)

## 2025-03-10 PROCEDURE — 97530 THERAPEUTIC ACTIVITIES: CPT

## 2025-03-10 PROCEDURE — 97110 THERAPEUTIC EXERCISES: CPT

## 2025-03-10 PROCEDURE — 25010000002 FUROSEMIDE PER 20 MG: Performed by: INTERNAL MEDICINE

## 2025-03-10 PROCEDURE — 94799 UNLISTED PULMONARY SVC/PX: CPT

## 2025-03-10 PROCEDURE — 82948 REAGENT STRIP/BLOOD GLUCOSE: CPT

## 2025-03-10 PROCEDURE — 99232 SBSQ HOSP IP/OBS MODERATE 35: CPT | Performed by: INTERNAL MEDICINE

## 2025-03-10 PROCEDURE — 63710000001 TACROLIMUS PER 1 MG: Performed by: INTERNAL MEDICINE

## 2025-03-10 RX ORDER — METOPROLOL SUCCINATE 25 MG/1
12.5 TABLET, EXTENDED RELEASE ORAL EVERY 12 HOURS SCHEDULED
Status: DISCONTINUED | OUTPATIENT
Start: 2025-03-10 | End: 2025-03-13 | Stop reason: HOSPADM

## 2025-03-10 RX ORDER — FUROSEMIDE 10 MG/ML
80 INJECTION INTRAMUSCULAR; INTRAVENOUS EVERY 12 HOURS
Status: DISCONTINUED | OUTPATIENT
Start: 2025-03-10 | End: 2025-03-13

## 2025-03-10 RX ORDER — METOLAZONE 5 MG/1
5 TABLET ORAL ONCE
Status: COMPLETED | OUTPATIENT
Start: 2025-03-10 | End: 2025-03-10

## 2025-03-10 RX ORDER — IPRATROPIUM BROMIDE AND ALBUTEROL SULFATE 2.5; .5 MG/3ML; MG/3ML
3 SOLUTION RESPIRATORY (INHALATION) EVERY 6 HOURS PRN
Status: DISCONTINUED | OUTPATIENT
Start: 2025-03-10 | End: 2025-03-13 | Stop reason: HOSPADM

## 2025-03-10 RX ADMIN — HYDROCODONE BITARTRATE AND ACETAMINOPHEN 1 TABLET: 10; 325 TABLET ORAL at 04:37

## 2025-03-10 RX ADMIN — Medication 10 ML: at 21:38

## 2025-03-10 RX ADMIN — Medication 1000 MCG: at 09:42

## 2025-03-10 RX ADMIN — MIDODRINE HYDROCHLORIDE 15 MG: 5 TABLET ORAL at 21:19

## 2025-03-10 RX ADMIN — TACROLIMUS 0.5 MG: 0.5 CAPSULE ORAL at 09:42

## 2025-03-10 RX ADMIN — FUROSEMIDE 80 MG: 10 INJECTION, SOLUTION INTRAMUSCULAR; INTRAVENOUS at 09:42

## 2025-03-10 RX ADMIN — HYDROCODONE BITARTRATE AND ACETAMINOPHEN 1 TABLET: 10; 325 TABLET ORAL at 16:30

## 2025-03-10 RX ADMIN — HYDROXYZINE HYDROCHLORIDE 25 MG: 25 TABLET, FILM COATED ORAL at 21:20

## 2025-03-10 RX ADMIN — HYDROXYZINE HYDROCHLORIDE 25 MG: 25 TABLET, FILM COATED ORAL at 14:22

## 2025-03-10 RX ADMIN — METOPROLOL SUCCINATE 12.5 MG: 25 TABLET, EXTENDED RELEASE ORAL at 21:22

## 2025-03-10 RX ADMIN — MIDODRINE HYDROCHLORIDE 15 MG: 5 TABLET ORAL at 04:37

## 2025-03-10 RX ADMIN — IPRATROPIUM BROMIDE AND ALBUTEROL SULFATE 3 ML: .5; 3 SOLUTION RESPIRATORY (INHALATION) at 20:35

## 2025-03-10 RX ADMIN — SENNOSIDES AND DOCUSATE SODIUM 1 TABLET: 50; 8.6 TABLET ORAL at 09:42

## 2025-03-10 RX ADMIN — Medication 10 ML: at 09:43

## 2025-03-10 RX ADMIN — MIRTAZAPINE 15 MG: 15 TABLET, FILM COATED ORAL at 21:18

## 2025-03-10 RX ADMIN — APIXABAN 5 MG: 5 TABLET, FILM COATED ORAL at 09:42

## 2025-03-10 RX ADMIN — FUROSEMIDE 80 MG: 10 INJECTION, SOLUTION INTRAMUSCULAR; INTRAVENOUS at 21:23

## 2025-03-10 RX ADMIN — MIDODRINE HYDROCHLORIDE 15 MG: 5 TABLET ORAL at 14:22

## 2025-03-10 RX ADMIN — POLYETHYLENE GLYCOL 3350 17 G: 17 POWDER, FOR SOLUTION ORAL at 09:41

## 2025-03-10 RX ADMIN — HYDROCODONE BITARTRATE AND ACETAMINOPHEN 1 TABLET: 10; 325 TABLET ORAL at 09:42

## 2025-03-10 RX ADMIN — Medication 10 ML: at 21:23

## 2025-03-10 RX ADMIN — APIXABAN 5 MG: 5 TABLET, FILM COATED ORAL at 21:22

## 2025-03-10 RX ADMIN — DULOXETINE 40 MG: 20 CAPSULE, DELAYED RELEASE ORAL at 09:42

## 2025-03-10 RX ADMIN — METOLAZONE 5 MG: 5 TABLET ORAL at 09:42

## 2025-03-10 RX ADMIN — TACROLIMUS 0.5 MG: 0.5 CAPSULE ORAL at 21:20

## 2025-03-10 RX ADMIN — LEVOTHYROXINE SODIUM 137 MCG: 0.03 TABLET ORAL at 04:37

## 2025-03-10 RX ADMIN — HYDROCODONE BITARTRATE AND ACETAMINOPHEN 1 TABLET: 10; 325 TABLET ORAL at 21:21

## 2025-03-10 RX ADMIN — METOPROLOL SUCCINATE 12.5 MG: 25 TABLET, EXTENDED RELEASE ORAL at 09:42

## 2025-03-10 NOTE — PROGRESS NOTES
LOS: 7 days   Patient Care Team:  Kvng Guillen MD as PCP - General (Family Medicine)  Jak Gavin MD as Cardiologist (Cardiology)    Subjective     Patient denies any new complaints    Review of Systems   Constitutional:  Positive for activity change, appetite change and fatigue.   HENT: Negative.     Respiratory: Negative.     Cardiovascular:  Positive for leg swelling.   Gastrointestinal: Negative.    Genitourinary: Negative.    Musculoskeletal:  Positive for gait problem.   Neurological:  Positive for weakness.   Psychiatric/Behavioral: Negative.             Objective     Vital Signs  Temp:  [97.5 °F (36.4 °C)-98.2 °F (36.8 °C)] 97.9 °F (36.6 °C)  Heart Rate:  [] 119  Resp:  [14-23] 16  BP: ()/(57-87) 109/61      Physical Exam  Vitals reviewed.   Constitutional:       Appearance: She is not ill-appearing.   HENT:      Head: Normocephalic and atraumatic.      Right Ear: External ear normal.      Left Ear: External ear normal.      Nose: Nose normal.      Mouth/Throat:      Mouth: Mucous membranes are dry.   Eyes:      General:         Right eye: No discharge.         Left eye: No discharge.   Cardiovascular:      Rate and Rhythm: Normal rate and regular rhythm.      Pulses: Normal pulses.      Heart sounds: Normal heart sounds.   Pulmonary:      Effort: Pulmonary effort is normal.      Breath sounds: Normal breath sounds.   Abdominal:      General: Bowel sounds are normal.      Palpations: Abdomen is soft.   Musculoskeletal:         General: Normal range of motion.      Cervical back: Normal range of motion.   Skin:     General: Skin is warm and dry.   Neurological:      Mental Status: She is alert and oriented to person, place, and time.   Psychiatric:         Behavior: Behavior normal.              Results Review:    Lab Results (last 24 hours)       Procedure Component Value Units Date/Time    POC Glucose Once [[id number]]  (Normal) Collected: 03/10/25 0828    Specimen: Blood Updated:  03/10/25 0829     Glucose 89 mg/dL      Comment: Serial Number: [id number]:  [id number]       Magnesium [[id number]]  (Normal) Collected: 03/09/25 2334    Specimen: Blood Updated: 03/10/25 0124     Magnesium 2.0 mg/dL     Comprehensive Metabolic Panel [[id number]]  (Abnormal) Collected: 03/09/25 2334    Specimen: Blood Updated: 03/10/25 0124     Glucose 103 mg/dL      BUN 47 mg/dL      Creatinine 1.14 mg/dL      Sodium 134 mmol/L      Potassium 4.5 mmol/L      Chloride 96 mmol/L      CO2 28.8 mmol/L      Calcium 9.3 mg/dL      Total Protein 5.7 g/dL      Albumin 3.0 g/dL      ALT (SGPT) 5 U/L      AST (SGOT) 13 U/L      Alkaline Phosphatase 76 U/L      Total Bilirubin 0.4 mg/dL      Globulin 2.7 gm/dL      A/G Ratio 1.1 g/dL      BUN/Creatinine Ratio 41.2     Anion Gap 9.2 mmol/L      eGFR 49.4 mL/min/1.73     Narrative:      GFR Categories in Chronic Kidney Disease (CKD)      GFR Category          GFR (mL/min/1.73)    Interpretation  G1                     90 or greater         Normal or high (1)  G2                      60-89                Mild decrease (1)  G3a                   45-59                Mild to moderate decrease  G3b                   30-44                Moderate to severe decrease  G4                    15-29                Severe decrease  G5                    14 or less           Kidney failure          (1)In the absence of evidence of kidney disease, neither GFR category G1 or G2 fulfill the criteria for CKD.    eGFR calculation 2021 CKD-EPI creatinine equation, which does not include race as a factor    Phosphorus [[id number]]  (Normal) Collected: 03/09/25 2334    Specimen: Blood Updated: 03/10/25 0122     Phosphorus 3.8 mg/dL     CBC & Differential [[id number]]  (Abnormal) Collected: 03/09/25 2334    Specimen: Blood Updated: 03/10/25 0103    Narrative:      The following orders were created for panel order CBC & Differential.  Procedure                               Abnormality          Status                     ---------                               -----------         ------                     CBC Auto Differential[[id number]]        Abnormal            Final result                 Please view results for these tests on the individual orders.    CBC Auto Differential [[id number]]  (Abnormal) Collected: 03/09/25 0004    Specimen: Blood Updated: 03/10/25 0103     WBC 7.04 10*3/mm3      RBC 2.59 10*6/mm3      Hemoglobin 7.7 g/dL      Hematocrit 26.3 %      .5 fL      MCH 29.7 pg      MCHC 29.3 g/dL      RDW 17.8 %      RDW-SD 65.7 fl      MPV 9.6 fL      Platelets 149 10*3/mm3      Neutrophil % 61.3 %      Lymphocyte % 20.0 %      Monocyte % 11.2 %      Eosinophil % 6.8 %      Basophil % 0.4 %      Immature Grans % 0.3 %      Neutrophils, Absolute 4.31 10*3/mm3      Lymphocytes, Absolute 1.41 10*3/mm3      Monocytes, Absolute 0.79 10*3/mm3      Eosinophils, Absolute 0.48 10*3/mm3      Basophils, Absolute 0.03 10*3/mm3      Immature Grans, Absolute 0.02 10*3/mm3      nRBC 0.0 /100 WBC     Comprehensive Metabolic Panel [[id number]]  (Abnormal) Collected: 03/09/25 1003    Specimen: Blood from Arm, Left Updated: 03/09/25 1130     Glucose 131 mg/dL      BUN 48 mg/dL      Creatinine 1.14 mg/dL      Sodium 135 mmol/L      Potassium 4.4 mmol/L      Chloride 97 mmol/L      CO2 29.0 mmol/L      Calcium 9.5 mg/dL      Total Protein 5.9 g/dL      Albumin 3.0 g/dL      ALT (SGPT) <5 U/L      AST (SGOT) 13 U/L      Alkaline Phosphatase 74 U/L      Total Bilirubin 0.5 mg/dL      Globulin 2.9 gm/dL      A/G Ratio 1.0 g/dL      BUN/Creatinine Ratio 42.1     Anion Gap 9.0 mmol/L      eGFR 49.4 mL/min/1.73     Narrative:      GFR Categories in Chronic Kidney Disease (CKD)      GFR Category          GFR (mL/min/1.73)    Interpretation  G1                     90 or greater         Normal or high (1)  G2                      60-89                Mild decrease (1)  G3a                   45-59                Mild to  moderate decrease  G3b                   30-44                Moderate to severe decrease  G4                    15-29                Severe decrease  G5                    14 or less           Kidney failure          (1)In the absence of evidence of kidney disease, neither GFR category G1 or G2 fulfill the criteria for CKD.    eGFR calculation 2021 CKD-EPI creatinine equation, which does not include race as a factor    Magnesium [[id number]]  (Normal) Collected: 03/09/25 1003    Specimen: Blood from Arm, Left Updated: 03/09/25 1127     Magnesium 1.9 mg/dL     Phosphorus [[id number]]  (Normal) Collected: 03/09/25 1003    Specimen: Blood from Arm, Left Updated: 03/09/25 1126     Phosphorus 3.8 mg/dL     CBC & Differential [[id number]]  (Abnormal) Collected: 03/09/25 1003    Specimen: Blood from Arm, Left Updated: 03/09/25 1102    Narrative:      The following orders were created for panel order CBC & Differential.  Procedure                               Abnormality         Status                     ---------                               -----------         ------                     CBC Auto Differential[[id number]]        Abnormal            Final result                 Please view results for these tests on the individual orders.    CBC Auto Differential [[id number]]  (Abnormal) Collected: 03/09/25 1003    Specimen: Blood from Arm, Left Updated: 03/09/25 1102     WBC 7.45 10*3/mm3      RBC 2.73 10*6/mm3      Hemoglobin 8.0 g/dL      Hematocrit 27.6 %      .1 fL      MCH 29.3 pg      MCHC 29.0 g/dL      RDW 17.9 %      RDW-SD 65.5 fl      MPV 9.2 fL      Platelets 148 10*3/mm3      Neutrophil % 65.0 %      Lymphocyte % 15.8 %      Monocyte % 10.1 %      Eosinophil % 8.1 %      Basophil % 0.5 %      Immature Grans % 0.5 %      Neutrophils, Absolute 4.84 10*3/mm3      Lymphocytes, Absolute 1.18 10*3/mm3      Monocytes, Absolute 0.75 10*3/mm3      Eosinophils, Absolute 0.60 10*3/mm3      Basophils, Absolute 0.04  10*3/mm3      Immature Grans, Absolute 0.04 10*3/mm3      nRBC 0.0 /100 WBC              Imaging Results (Last 24 Hours)       ** No results found for the last 24 hours. **                 I reviewed the patient's new clinical results.    Medication Review:   Scheduled Meds:apixaban, 5 mg, Oral, Q12H  DULoxetine, 40 mg, Oral, Daily  furosemide, 80 mg, Intravenous, Q12H  HYDROcodone-acetaminophen, 1 tablet, Oral, Q6H  levothyroxine, 137 mcg, Oral, Q AM  metoprolol succinate XL, 12.5 mg, Oral, Q24H  midodrine, 15 mg, Oral, Q8H  mirtazapine, 15 mg, Oral, Nightly  polyethylene glycol, 17 g, Oral, Daily  sennosides-docusate, 1 tablet, Oral, BID  sodium chloride, 10 mL, Intravenous, Q12H  sodium chloride, 10 mL, Intravenous, Q12H  tacrolimus, 0.5 mg, Oral, BID  vitamin B-12, 1,000 mcg, Oral, Daily      Continuous Infusions:   PRN Meds:.  acetaminophen **OR** acetaminophen    albuterol    aluminum-magnesium hydroxide-simethicone    senna-docusate sodium **AND** polyethylene glycol **AND** bisacodyl **AND** bisacodyl    hydrOXYzine    ipratropium-albuterol    [Held by provider] LORazepam    nitroglycerin    ondansetron ODT **OR** ondansetron    prochlorperazine    [COMPLETED] Insert Peripheral IV **AND** sodium chloride    sodium chloride    sodium chloride    sodium chloride    sodium chloride    sodium chloride     Interval History:    Assessment & Plan     UTI (urinary tract infection)  Acute exacerbation of CHF (congestive heart failure)  End stage renal disease  Edema of right upper arm  TONYA (acute kidney injury)  Pressure injury of right heel, stage 3  Edema of both lower extremities due to peripheral venous insufficiency  COPD (chronic obstructive pulmonary disease)  Hypothyroidism, unspecified  History of kidney transplant  Long term (current) use of immunosuppressive biologic  Long term current use of anticoagulant therapy  History of DVT (deep vein thrombosis)  Pulmonary hypertension  Mixed anxiety and depressive  disorder  Essential (primary) hypertension  Paroxysmal atrial fibrillation    Plan of care  Urine culture negative afebrile no leukocytosis will dc rocephin. Nephrology following for diuresis and management. Creatinine is improving/ continue Prograf and midodrine for support.  She does have working fistula if needed for HD. Appreciate wound care recommendations.    Patient had an episode this am with diuresis, elevated HR, and decrease in oxygenation. Resolved on its own. Patient states she thought she was going to die. Nephrology transitioned back to IV diuretic. Continue current plan of care and medication    Patient is DNR/DNI    Plan for disposition::back to [facility]     Janene Archer, TONYA  03/10/25  10:44 EDT

## 2025-03-10 NOTE — CASE MANAGEMENT/SOCIAL WORK
Continued Stay Note  DAHIANA Gay     Patient Name: Jaja Carcamo  MRN: [id number]  Today's Date: 3/10/2025    Admit Date: 3/3/2025    Plan: Return to [facility], [facility]. Precert pending for Skilled, started 3/7 pending 3/10. Can return pending. No PASRR.   Discharge Plan       Row Name 03/10/25 1522       Plan    Plan Return to [facility]. Precert pending for Skilled, started 3/7 pending 3/10. Can return pending. No PASRR.    Patient/Family in Agreement with Plan yes    Plan Comments CM notified that precert is still pending. DC Barriers: IV lasix, nephrology following, 10L O2 HF, hgb 7.7, monitoring labs             Hetal Barber RN     Office: [phone]

## 2025-03-10 NOTE — PLAN OF CARE
Problem: Violence Risk or Actual  Goal: Anger and Impulse Control  Intervention: Minimize Safety Risk  Recent Flowsheet Documentation  Taken 3/10/2025 1600 by Philippe Dodge RN  Enhanced Safety Measures: bed alarm set  Taken 3/10/2025 1400 by Philippe Dodge RN  Enhanced Safety Measures: bed alarm set  Taken 3/10/2025 1200 by Philippe Dodge RN  Enhanced Safety Measures: bed alarm set  Taken 3/10/2025 0800 by Pihlippe Dodge RN  Enhanced Safety Measures: bed alarm set     Problem: Adult Inpatient Plan of Care  Goal: Absence of Hospital-Acquired Illness or Injury  Intervention: Identify and Manage Fall Risk  Recent Flowsheet Documentation  Taken 3/10/2025 1600 by Philippe Dodge RN  Safety Promotion/Fall Prevention: safety round/check completed  Taken 3/10/2025 1400 by Philippe Dodge RN  Safety Promotion/Fall Prevention: safety round/check completed  Taken 3/10/2025 1200 by Philippe Dodge RN  Safety Promotion/Fall Prevention: safety round/check completed  Taken 3/10/2025 0800 by Philippe Dodge RN  Safety Promotion/Fall Prevention: safety round/check completed  Intervention: Prevent Skin Injury  Recent Flowsheet Documentation  Taken 3/10/2025 1600 by Philippe Dodge RN  Skin Protection: incontinence pads utilized  Taken 3/10/2025 1200 by Philippe Dodge RN  Skin Protection: incontinence pads utilized  Taken 3/10/2025 0800 by Philippe Dodge RN  Skin Protection: incontinence pads utilized  Intervention: Prevent Infection  Recent Flowsheet Documentation  Taken 3/10/2025 1600 by Philippe Dodge RN  Infection Prevention:   environmental surveillance performed   single patient room provided  Taken 3/10/2025 1400 by Philippe Dodge RN  Infection Prevention:   environmental surveillance performed   single patient room provided  Taken 3/10/2025 1200 by Philippe Dodge RN  Infection Prevention:   environmental surveillance performed   single patient room provided  Taken 3/10/2025  1000 by Philippe Dodge RN  Infection Prevention:   environmental surveillance performed   single patient room provided  Taken 3/10/2025 0800 by Philippe Dodge RN  Infection Prevention:   environmental surveillance performed   single patient room provided  Goal: Optimal Comfort and Wellbeing  Intervention: Provide Person-Centered Care  Recent Flowsheet Documentation  Taken 3/10/2025 1200 by Philippe Dodge RN  Trust Relationship/Rapport: care explained  Taken 3/10/2025 0800 by Philippe Dodge RN  Trust Relationship/Rapport: care explained     Problem: Skin Injury Risk Increased  Goal: Skin Health and Integrity  Intervention: Optimize Skin Protection  Recent Flowsheet Documentation  Taken 3/10/2025 1600 by Philippe Dodge RN  Pressure Reduction Techniques: frequent weight shift encouraged  Pressure Reduction Devices: positioning supports utilized  Skin Protection: incontinence pads utilized  Taken 3/10/2025 1200 by Philippe Dodge RN  Pressure Reduction Techniques: frequent weight shift encouraged  Pressure Reduction Devices: positioning supports utilized  Skin Protection: incontinence pads utilized  Taken 3/10/2025 0800 by Philippe Dodge RN  Pressure Reduction Techniques: frequent weight shift encouraged  Pressure Reduction Devices: positioning supports utilized  Skin Protection: incontinence pads utilized     Problem: Fall Injury Risk  Goal: Absence of Fall and Fall-Related Injury  Intervention: Promote Injury-Free Environment  Recent Flowsheet Documentation  Taken 3/10/2025 1600 by Philippe Dodge RN  Safety Promotion/Fall Prevention: safety round/check completed  Taken 3/10/2025 1400 by Philippe Dodge RN  Safety Promotion/Fall Prevention: safety round/check completed  Taken 3/10/2025 1200 by Philippe Dodge RN  Safety Promotion/Fall Prevention: safety round/check completed  Taken 3/10/2025 0800 by Philippe Dodge RN  Safety Promotion/Fall Prevention: safety round/check  completed     Problem: Sepsis/Septic Shock  Goal: Absence of Infection Signs and Symptoms  Intervention: Initiate Sepsis Management  Recent Flowsheet Documentation  Taken 3/10/2025 1600 by Philippe Dodge RN  Infection Prevention:   environmental surveillance performed   single patient room provided  Isolation Precautions:   precautions maintained   droplet  Taken 3/10/2025 1400 by Philippe Dodge RN  Infection Prevention:   environmental surveillance performed   single patient room provided  Isolation Precautions:   precautions maintained   droplet  Taken 3/10/2025 1200 by Philippe Dodge RN  Infection Prevention:   environmental surveillance performed   single patient room provided  Isolation Precautions:   precautions maintained   droplet  Taken 3/10/2025 1000 by Philippe Dodge RN  Infection Prevention:   environmental surveillance performed   single patient room provided  Isolation Precautions:   precautions maintained   droplet  Taken 3/10/2025 0800 by Philippe Dodge RN  Infection Prevention:   environmental surveillance performed   single patient room provided  Isolation Precautions:   precautions maintained   droplet   Goal Outcome Evaluation:

## 2025-03-10 NOTE — PROGRESS NOTES
Referring Provider: Juanis Lowe MD    Reason for follow-up: acute CHF, hypotension, a-fib      Patient Care Team:  Kvng Guillen MD as PCP - General (Family Medicine)  Jak Gavin MD as Cardiologist (Cardiology)      SUBJECTIVE  Episode of tachycardia, hypoxemia this morning.  Heart rate up to 120 and oxygen requirement up to 10 L.       ROS  Review of all systems negative except as indicated.    Since I have last seen, the patient has been without any chest discomfort, shortness of breath, palpitations, dizziness or syncope.  Denies having any headache, abdominal pain, nausea, vomiting, diarrhea, constipation, loss of weight or loss of appetite.  Denies having any excessive bruising, hematuria or blood in the stool.        Personal History:    Past Medical History:   Diagnosis Date    Allergic rhinitis 04/14/2015    Asthma 08/10/2017    Atrial flutter 05/11/2017    Chronic diarrhea 04/15/2019    Chronic hypoxemic respiratory failure 01/18/2024    Chronic pain 02/03/2015    COPD (chronic obstructive pulmonary disease)     COVID-19 virus detected 08/11/2022    Cytokine release syndrome, grade 1 08/16/2022    Edema of both lower extremities due to peripheral venous insufficiency 03/12/2021    ESRD on hemodialysis 05/22/2013    Essential (primary) hypertension 03/03/2022    Fracture of ulnar styloid 05/19/2022    Fracture of unspecified carpal bone, right wrist, subsequent encounter for fracture with routine healing 03/03/2022    Gastroesophageal reflux disease 11/12/2020    Gout 08/10/2017    Hearing loss 08/10/2017    History of appendectomy     History of DVT (deep vein thrombosis) 11/12/2020    History of kidney transplant 06/30/2017    History of lobectomy of lung 01/18/2024 03/08/2016:  RIGHT Lower Lobe Mass--> Right Video-assisted thoracoscopy with a moderate-to-large wedge resection of the RLL (by Dr. Haris Mcconnell @ [facility])--> Poorly differentiated carcinoma of the RLL.      History of  repair of hip joint 05/21/2013    History of suicide attempt 05/20/2024    Hyperlipidemia 08/11/2014    Hypothyroidism, unspecified 03/03/2022    Infection due to extended spectrum beta lactamase (ESBL) producing bacteria 03/11/2016    No A2K system hx. +ESBL E coli urine on 3/11/16.      Irritable bowel syndrome 04/15/2019    Long term (current) use of immunosuppressive biologic 04/28/2021    Long term current use of anticoagulant therapy 01/18/2024    Malignant neoplasm of lung 08/10/2017    Menopausal flushing 08/30/2021    Mitral valve regurgitation 07/06/2015    Mixed anxiety and depressive disorder 04/30/2015    Non-small cell lung cancer 08/10/2017    Nonobstructive atherosclerosis of coronary artery 06/02/2019 08/12/2022: CATH: Prashant: NSTEMI assoc with Covid-19: LM:-nl;  LAD: diffuse, Ca++; 30%; CIRC: Dominant. Normal; RCA: small; 50% diffuse, proximal and mid-segment.      NSTEMI (non-ST elevated myocardial infarction) 08/11/2022    Obsessive-compulsive disorder 04/15/2019    Osteoarthritis 05/20/2024    Paroxysmal atrial fibrillation 05/11/2017    Paroxysmal supraventricular tachycardia 05/11/2017    Peripheral neuropathy 08/11/2014    Personal history of peptic ulcer disease 11/12/2020    Postoperative anemia due to acute blood loss 05/22/2013    Right wrist fracture 03/01/2022    Seasonal allergies 08/10/2017    Secondary hyperparathyroidism of renal origin 09/14/2015    Tricuspid valve regurgitation 03/15/2017    Ulcer of lower extremity 08/30/2021    Unspecified acute appendicitis 03/03/2022    Valvular heart disease 05/20/2024    Wegener's granulomatosis with renal involvement 03/03/2022       Past Surgical History:   Procedure Laterality Date    APPENDECTOMY      ARTERIOVENOUS FISTULA/SHUNT SURGERY Right 12/3/2024    Procedure: FISTULOGRAM  and angioplasty RIGHT ARM;  Surgeon: Paulino Alva II, MD;  Location: [facility];  Service: Vascular;  Laterality: Right;    BREAST SURGERY  Left     cysts rmeoved    CARDIAC CATHETERIZATION Right 08/12/2022    Procedure: Left Heart Cath and coronary angiogram;  Surgeon: Jak Gavin MD;  Location: [facility] CATH INVASIVE LOCATION;  Service: Cardiology;  Laterality: Right;    CLOSED REDUCTION WRIST FRACTURE Right 03/01/2022    Procedure: WRIST CLOSED REDUCTION;  Surgeon: Gaudencio Townsend MD;  Location: [facility] MAIN OR;  Service: Orthopedics;  Laterality: Right;    CYSTOSCOPY      HIP ARTHROPLASTY      HYSTERECTOMY      LUNG LOBECTOMY Right     TRANSPLANTATION RENAL         Family History   Problem Relation Age of Onset    No Known Problems Mother     No Known Problems Father        Social History     Tobacco Use    Smoking status: Former     Passive exposure: Current    Smokeless tobacco: Never   Vaping Use    Vaping status: Former   Substance Use Topics    Alcohol use: Never    Drug use: Never        Home meds:  Prior to Admission medications    Medication Sig Start Date End Date Taking? Authorizing Provider   amoxicillin-clavulanate (AUGMENTIN) 875-125 MG per tablet Take 1 tablet by mouth 2 (Two) Times a Day. 3/1/25  Yes Lorenzo Morales MD   apixaban (ELIQUIS) 5 MG tablet tablet Take 1 tablet by mouth Every 12 (Twelve) Hours. 8/16/22  Yes Clyde White DO   DULoxetine HCl 40 MG capsule delayed-release particles Take 1 capsule by mouth Daily. Indications: Major Depressive Disorder 7/11/24  Yes Court Vences MD   ferrous sulfate 325 (65 FE) MG tablet Take 1 tablet by mouth 3 times a day.   Yes Court Vences MD   furosemide (LASIX) 40 MG tablet Take 1 tablet by mouth 2 (Two) Times a Day.   Yes Court Vences MD   hydrOXYzine (ATARAX) 25 MG tablet Take 1 tablet by mouth Every Night.   Yes Court Vences MD   LORazepam (ATIVAN) 0.5 MG tablet Take 1 tablet by mouth Every 6 (Six) Hours.   Yes Court Vences MD   metoprolol tartrate (LOPRESSOR) 25 MG tablet Take 0.5 tablets by mouth 2 (Two) Times a Day.   Yes Ru  MD Court   midodrine (PROAMATINE) 5 MG tablet Take 3 tablets by mouth 3 (Three) Times a Day Before Meals.   Yes Court Vences MD   mirtazapine (REMERON) 15 MG tablet Take 1 tablet by mouth Every Night.   Yes Coutr Vences MD   polyethylene glycol (MiraLax) 17 GM/SCOOP powder Take 17 g by mouth Daily.   Yes Court Vences MD   predniSONE (DELTASONE) 5 MG tablet Take 1 tablet by mouth Daily. Indications: ACUTE EXACERBATION OF COPD (INACTIVE) 7/11/24  Yes Court Vences MD   sacubitril-valsartan (Entresto) 24-26 MG tablet Take 1 tablet by mouth 2 (Two) Times a Day.   Yes Court Vences MD   sennosides-docusate (PERICOLACE) 8.6-50 MG per tablet Take 1 tablet by mouth 2 (Two) Times a Day.   Yes Court Vences MD   simvastatin (ZOCOR) 20 MG tablet Take 1 tablet by mouth Every Night.   Yes Court Vences MD   tacrolimus (PROGRAF) 0.5 MG capsule Take 1 capsule by mouth 2 (Two) Times a Day. 6/13/24  Yes Janene Archer APRN   vitamin B-12 (CYANOCOBALAMIN) 1000 MCG tablet Take 1 tablet by mouth Daily.   Yes Court Vences MD   vitamin D (ERGOCALCIFEROL) 1.25 MG (87077 UT) capsule capsule Take 1 capsule by mouth 1 (One) Time Per Week.   Yes Court Vences MD   acetaminophen (Tylenol) 325 MG tablet Take 2 tablets by mouth Every 4 (Four) Hours As Needed for Fever or Mild Pain. Indications: Pain 3/13/20   Court Vences MD   albuterol sulfate  (90 Base) MCG/ACT inhaler Inhale 2 puffs Every 6 (Six) Hours As Needed for Wheezing.    Court Vences MD   HYDROcodone-acetaminophen (NORCO)  MG per tablet Take 1 tablet by mouth Every 6 (Six) Hours.    Court Vences MD       Allergies:  Zolpidem    Scheduled Meds:apixaban, 5 mg, Oral, Q12H  DULoxetine, 40 mg, Oral, Daily  furosemide, 80 mg, Oral, BID Diuretics  HYDROcodone-acetaminophen, 1 tablet, Oral, Q6H  levothyroxine, 137 mcg, Oral, Q AM  metoprolol succinate XL, 12.5 mg, Oral,  "Q24H  midodrine, 15 mg, Oral, Q8H  mirtazapine, 15 mg, Oral, Nightly  polyethylene glycol, 17 g, Oral, Daily  sennosides-docusate, 1 tablet, Oral, BID  sodium chloride, 10 mL, Intravenous, Q12H  sodium chloride, 10 mL, Intravenous, Q12H  tacrolimus, 0.5 mg, Oral, BID  vitamin B-12, 1,000 mcg, Oral, Daily      Continuous Infusions:   PRN Meds:.  acetaminophen **OR** acetaminophen    albuterol    aluminum-magnesium hydroxide-simethicone    senna-docusate sodium **AND** polyethylene glycol **AND** bisacodyl **AND** bisacodyl    hydrOXYzine    ipratropium-albuterol    [Held by provider] LORazepam    nitroglycerin    ondansetron ODT **OR** ondansetron    prochlorperazine    [COMPLETED] Insert Peripheral IV **AND** sodium chloride    sodium chloride    sodium chloride    sodium chloride    sodium chloride    sodium chloride      OBJECTIVE    Vital Signs  Vitals:    03/09/25 2045 03/09/25 2318 03/10/25 0331 03/10/25 0439   BP: 102/57 102/59 104/61    BP Location: Left arm Left arm Left arm    Patient Position: Lying Lying Lying    Pulse: 94 102 98    Resp: 23 14 22    Temp: 98 °F (36.7 °C) 98 °F (36.7 °C) 98.2 °F (36.8 °C)    TempSrc: Oral Oral Axillary    SpO2: 100% 91% 98%    Weight:    77.6 kg (171 lb)   Height:           Flowsheet Rows      Flowsheet Row First Filed Value   Admission Height 167.7 cm (66.02\") Documented at 03/03/2025 1641   Admission Weight 81.6 kg (179 lb 14.3 oz) Documented at 03/03/2025 1641              Intake/Output Summary (Last 24 hours) at 3/10/2025 0640  Last data filed at 3/10/2025 0331  Gross per 24 hour   Intake 120 ml   Output 400 ml   Net -280 ml          Telemetry: Atrial fibrillation with rapid ventricular rate    Physical Exam:  The patient is alert and in no distress. She is frail and appears chronically ill.   Vital signs as noted above.  Head and neck revealed no carotid bruits or jugular venous distention.   Lungs with scattered rhonchi and diminished bases.  No wheezing.  On oxygen " at 4 liters per high flow nasal cannula.   Heart normal first and second heart sounds.  No murmur. No precordial rub is present.  No gallop is present.  Abdomen soft and nontender.    Extremities with good peripheral pulses with 1+ edema in BLE.  Skin warm and dry.  Musculoskeletal system is grossly normal.  CNS:  she is oriented to person only      Results Review:  I have personally reviewed the results from the time of this admission to 3/10/2025 06:40 EDT and agree with these findings:  [x]  Laboratory  [x]  Microbiology  [x]  Radiology  [x]  EKG/Telemetry   [x]  Cardiology/Vascular   []  Pathology  [x]  Old records  []  Other:    Most notable findings include:    Lab Results (last 24 hours)       Procedure Component Value Units Date/Time    Magnesium [[id number]]  (Normal) Collected: 03/09/25 2334    Specimen: Blood Updated: 03/10/25 0124     Magnesium 2.0 mg/dL     Comprehensive Metabolic Panel [[id number]]  (Abnormal) Collected: 03/09/25 2334    Specimen: Blood Updated: 03/10/25 0124     Glucose 103 mg/dL      BUN 47 mg/dL      Creatinine 1.14 mg/dL      Sodium 134 mmol/L      Potassium 4.5 mmol/L      Chloride 96 mmol/L      CO2 28.8 mmol/L      Calcium 9.3 mg/dL      Total Protein 5.7 g/dL      Albumin 3.0 g/dL      ALT (SGPT) 5 U/L      AST (SGOT) 13 U/L      Alkaline Phosphatase 76 U/L      Total Bilirubin 0.4 mg/dL      Globulin 2.7 gm/dL      A/G Ratio 1.1 g/dL      BUN/Creatinine Ratio 41.2     Anion Gap 9.2 mmol/L      eGFR 49.4 mL/min/1.73     Narrative:      GFR Categories in Chronic Kidney Disease (CKD)      GFR Category          GFR (mL/min/1.73)    Interpretation  G1                     90 or greater         Normal or high (1)  G2                      60-89                Mild decrease (1)  G3a                   45-59                Mild to moderate decrease  G3b                   30-44                Moderate to severe decrease  G4                    15-29                Severe decrease  G5                     14 or less           Kidney failure          (1)In the absence of evidence of kidney disease, neither GFR category G1 or G2 fulfill the criteria for CKD.    eGFR calculation 2021 CKD-EPI creatinine equation, which does not include race as a factor    Phosphorus [[id number]]  (Normal) Collected: 03/09/25 2334    Specimen: Blood Updated: 03/10/25 0122     Phosphorus 3.8 mg/dL     CBC & Differential [[id number]]  (Abnormal) Collected: 03/09/25 2334    Specimen: Blood Updated: 03/10/25 0103    Narrative:      The following orders were created for panel order CBC & Differential.  Procedure                               Abnormality         Status                     ---------                               -----------         ------                     CBC Auto Differential[[id number]]        Abnormal            Final result                 Please view results for these tests on the individual orders.    CBC Auto Differential [[id number]]  (Abnormal) Collected: 03/09/25 2334    Specimen: Blood Updated: 03/10/25 0103     WBC 7.04 10*3/mm3      RBC 2.59 10*6/mm3      Hemoglobin 7.7 g/dL      Hematocrit 26.3 %      .5 fL      MCH 29.7 pg      MCHC 29.3 g/dL      RDW 17.8 %      RDW-SD 65.7 fl      MPV 9.6 fL      Platelets 149 10*3/mm3      Neutrophil % 61.3 %      Lymphocyte % 20.0 %      Monocyte % 11.2 %      Eosinophil % 6.8 %      Basophil % 0.4 %      Immature Grans % 0.3 %      Neutrophils, Absolute 4.31 10*3/mm3      Lymphocytes, Absolute 1.41 10*3/mm3      Monocytes, Absolute 0.79 10*3/mm3      Eosinophils, Absolute 0.48 10*3/mm3      Basophils, Absolute 0.03 10*3/mm3      Immature Grans, Absolute 0.02 10*3/mm3      nRBC 0.0 /100 WBC     Comprehensive Metabolic Panel [[id number]]  (Abnormal) Collected: 03/09/25 1003    Specimen: Blood from Arm, Left Updated: 03/09/25 1130     Glucose 131 mg/dL      BUN 48 mg/dL      Creatinine 1.14 mg/dL      Sodium 135 mmol/L      Potassium 4.4 mmol/L      Chloride 97  mmol/L      CO2 29.0 mmol/L      Calcium 9.5 mg/dL      Total Protein 5.9 g/dL      Albumin 3.0 g/dL      ALT (SGPT) <5 U/L      AST (SGOT) 13 U/L      Alkaline Phosphatase 74 U/L      Total Bilirubin 0.5 mg/dL      Globulin 2.9 gm/dL      A/G Ratio 1.0 g/dL      BUN/Creatinine Ratio 42.1     Anion Gap 9.0 mmol/L      eGFR 49.4 mL/min/1.73     Narrative:      GFR Categories in Chronic Kidney Disease (CKD)      GFR Category          GFR (mL/min/1.73)    Interpretation  G1                     90 or greater         Normal or high (1)  G2                      60-89                Mild decrease (1)  G3a                   45-59                Mild to moderate decrease  G3b                   30-44                Moderate to severe decrease  G4                    15-29                Severe decrease  G5                    14 or less           Kidney failure          (1)In the absence of evidence of kidney disease, neither GFR category G1 or G2 fulfill the criteria for CKD.    eGFR calculation 2021 CKD-EPI creatinine equation, which does not include race as a factor    Magnesium [[id number]]  (Normal) Collected: 03/09/25 1003    Specimen: Blood from Arm, Left Updated: 03/09/25 1127     Magnesium 1.9 mg/dL     Phosphorus [[id number]]  (Normal) Collected: 03/09/25 1003    Specimen: Blood from Arm, Left Updated: 03/09/25 1126     Phosphorus 3.8 mg/dL     CBC & Differential [[id number]]  (Abnormal) Collected: 03/09/25 1003    Specimen: Blood from Arm, Left Updated: 03/09/25 1102    Narrative:      The following orders were created for panel order CBC & Differential.  Procedure                               Abnormality         Status                     ---------                               -----------         ------                     CBC Auto Differential[[id number]]        Abnormal            Final result                 Please view results for these tests on the individual orders.    CBC Auto Differential [[id number]]   (Abnormal) Collected: 03/09/25 1003    Specimen: Blood from Arm, Left Updated: 03/09/25 1102     WBC 7.45 10*3/mm3      RBC 2.73 10*6/mm3      Hemoglobin 8.0 g/dL      Hematocrit 27.6 %      .1 fL      MCH 29.3 pg      MCHC 29.0 g/dL      RDW 17.9 %      RDW-SD 65.5 fl      MPV 9.2 fL      Platelets 148 10*3/mm3      Neutrophil % 65.0 %      Lymphocyte % 15.8 %      Monocyte % 10.1 %      Eosinophil % 8.1 %      Basophil % 0.5 %      Immature Grans % 0.5 %      Neutrophils, Absolute 4.84 10*3/mm3      Lymphocytes, Absolute 1.18 10*3/mm3      Monocytes, Absolute 0.75 10*3/mm3      Eosinophils, Absolute 0.60 10*3/mm3      Basophils, Absolute 0.04 10*3/mm3      Immature Grans, Absolute 0.04 10*3/mm3      nRBC 0.0 /100 WBC             Imaging Results (Last 24 Hours)       ** No results found for the last 24 hours. **            LAB RESULTS (LAST 7 DAYS)    CBC  Results from last 7 days   Lab Units 03/09/25  2334 03/09/25  1003 03/08/25  0544 03/07/25  0541 03/06/25  0907 03/05/25  0744 03/04/25  0231 03/04/25  0124   WBC 10*3/mm3 7.04 7.45 7.03 8.45 7.20 8.11  --  8.18   RBC 10*6/mm3 2.59* 2.73* 2.80* 2.81* 2.88* 2.73*  --  3.05*   HEMOGLOBIN g/dL 7.7* 8.0* 8.3* 8.4* 8.6* 8.1*  --  9.1*   HEMOGLOBIN, POC g/dL  --   --   --   --   --   --  10.0*  --    HEMATOCRIT % 26.3* 27.6* 28.4* 28.7* 29.6* 28.1*  --  31.5*   HEMATOCRIT POC %  --   --   --   --   --   --  29*  --    MCV fL 101.5* 101.1* 101.4* 102.1* 102.8* 102.9*  --  103.3*   PLATELETS 10*3/mm3 149 148 132* 161 167 168  --  204       BMP  Results from last 7 days   Lab Units 03/09/25  2334 03/09/25  1003 03/08/25  0544 03/07/25  0541 03/06/25  0907 03/05/25  0744 03/04/25  0549 03/04/25  0231 03/04/25  0124   SODIUM mmol/L 134* 135* 136 136 136 132* 140   < >  --    POTASSIUM mmol/L 4.5 4.4 4.7 4.7 4.9 5.1 4.9   < >  --    CHLORIDE mmol/L 96* 97* 101 97* 98 97* 103   < >  --    CO2 mmol/L 28.8 29.0 26.1 28.0 30.6* 26.8 28.3   < >  --    BUN mg/dL 47* 48* 48*  50* 51* 51* 48*   < >  --    CREATININE mg/dL 1.14* 1.14* 1.19* 1.41* 1.75* 1.91* 1.84*   < >  --    GLUCOSE mg/dL 103* 131* 76 83 96 99 120*   < >  --    MAGNESIUM mg/dL 2.0 1.9 1.8 2.0 2.0 2.0 2.0   < >  --    PHOSPHORUS mg/dL 3.8 3.8 3.2 3.3 3.3 3.3  --   --  4.6*    < > = values in this interval not displayed.       CMP   Results from last 7 days   Lab Units 03/09/25  2334 03/09/25  1003 03/08/25  0544 03/07/25  0541 03/06/25  0907 03/05/25  0744 03/04/25  0549 03/04/25  0231 03/04/25  0124   SODIUM mmol/L 134* 135* 136 136 136 132* 140   < >  --    POTASSIUM mmol/L 4.5 4.4 4.7 4.7 4.9 5.1 4.9   < >  --    CHLORIDE mmol/L 96* 97* 101 97* 98 97* 103   < >  --    CO2 mmol/L 28.8 29.0 26.1 28.0 30.6* 26.8 28.3   < >  --    BUN mg/dL 47* 48* 48* 50* 51* 51* 48*   < >  --    CREATININE mg/dL 1.14* 1.14* 1.19* 1.41* 1.75* 1.91* 1.84*   < >  --    GLUCOSE mg/dL 103* 131* 76 83 96 99 120*   < >  --    ALBUMIN g/dL 3.0* 3.0* 2.9* 3.1* 3.2* 3.1* 3.7  --   --    BILIRUBIN mg/dL 0.4 0.5 0.4 0.5 0.5 0.4 0.4  --   --    ALK PHOS U/L 76 74 70 72 75 69 67  --   --    AST (SGOT) U/L 13 13 22 13 15 13 18  --   --    ALT (SGPT) U/L 5 <5 <5 <5 <5 <5 5  --   --    AMMONIA umol/L  --   --   --   --   --   --   --   --  22    < > = values in this interval not displayed.       BNP        TROPONIN  Results from last 7 days   Lab Units 03/03/25  1839   HSTROP T ng/L 49*       CoAg        Creatinine Clearance  Estimated Creatinine Clearance: 42.8 mL/min (A) (by C-G formula based on SCr of 1.14 mg/dL (H)).    ABG  Results from last 7 days   Lab Units 03/04/25  0231   PH, ARTERIAL pH units 7.291*   PCO2, ARTERIAL mm Hg 69.0*   PO2 ART mm Hg 82.0*   O2 SATURATION ART % 94.0   BASE EXCESS ART mmol/L 5.2*       Radiology  No radiology results for the last day      EKG  I personally viewed and interpreted the patient's EKG/Telemetry data:  ECG 12 Lead Altered Mental Status   Final Result   HEART RATE=96  bpm   RR Zxbiwfmj=826  ms   AR Interval=   ms   P Horizontal Axis=  deg   P Front Axis=  deg   QRSD Interval=80  ms   QT Hxkvtncf=647  ms   FZuE=581  ms   QRS Axis=-39  deg   T Wave Axis=90  deg   - ABNORMAL ECG -   Atrial fibrillation   Ventricular premature complex   Left axis deviation   Anteroseptal  infarct, age indeterminate   When compared with ECG of 01-Mar-2025 09:40:43,   Significant axis, voltage or hypertrophy change   Electronically Signed By: Paulino Miller (TWILA) 2025-03-04 07:28:16   Date and Time of Study:2025-03-03 16:45:51      Telemetry Scan   Final Result      Telemetry Scan   Final Result            Echocardiogram:    Results for orders placed during the hospital encounter of 11/29/24    Adult Transthoracic Echo Complete W/ Cont if Necessary Per Protocol    Interpretation Summary    Left ventricular systolic function is normal. Calculated left ventricular EF = 70% Left ventricular ejection fraction appears to be 66 - 70%.    Left ventricular wall thickness is consistent with moderate to severe concentric hypertrophy.    Left ventricular diastolic function is consistent with (grade III w/high LAP) reversible restrictive pattern.    Moderately reduced right ventricular systolic function noted.    The right ventricular cavity is severely dilated.    The left atrial cavity is severely dilated.    The right atrial cavity is severely  dilated.    There is mild calcification of the aortic valve mainly affecting the left coronary and right coronary cusp(s).    Severe tricuspid valve regurgitation is present.    Estimated right ventricular systolic pressure from tricuspid regurgitation is markedly elevated (>55 mmHg).    Severe pulmonary hypertension is present.    There is a moderate sized left pleural effusion.        Stress Test:         Cardiac Catheterization:  Results for orders placed during the hospital encounter of 08/11/22    Cardiac Catheterization/Vascular Study    Conclusion  IMPRESSIONS  Non obstructive CAD         Other:          ASSESSMENT & PLAN:    Principal Problem:    UTI (urinary tract infection)  Active Problems:    COPD (chronic obstructive pulmonary disease)    Hypothyroidism, unspecified    History of kidney transplant    Long term (current) use of immunosuppressive biologic    Long term current use of anticoagulant therapy    History of DVT (deep vein thrombosis)    Pulmonary hypertension    Decubitus ulcer of foot, stage 3    Edema of both lower extremities due to peripheral venous insufficiency    Mixed anxiety and depressive disorder    Essential (primary) hypertension    Paroxysmal atrial fibrillation    Acute exacerbation of CHF (congestive heart failure)    End stage renal disease    Edema of right upper arm    TONYA (acute kidney injury)    Pressure injury of right heel, stage 3      Atrial fibrillation with rapid ventricular rate  Heart rate 100s-110s  Increase Toprol-XL twice daily today  [id number] score is 5  Continue Eliquis      Acute on chronic HFpEF / Pulmonary hypertension  Echocardiogram shows preserved LV function with severe tricuspid valve regurgitation and severe pulmonary hypertension.  Presented with proBNP of 17,000 (compared to 4800 during previous admission)  Continue IV diuretics.  Dosing per nephrology  Metolazone as needed  Unable to add GDMT due to renal dysfunction and hypotension requiring midodrine  Strict I&Os and daily weight     Hypotension  Requiring midodrine  Entresto is on hold     History of kidney transplant / TONYA on CKD  AV fistula in place, but no on hemodialysis   On tacrolimus  Creatinine improving 1.14, GFR 49.4  Closely monitor renal function while on diuretics     Right IJ DVT  Diagnosed in December 2024  Repeat venous duplex is negative for DVT  VQ scan with low probability of PE  Continue Eliquis     Anemia in CKD   H&H 7.7/26.3  Monitor H&H while on anticoagulation  Transfuse as needed     COPD  History of lung cancer  10 L of oxygen  Continue bronchodilators      Anxiety/depression  Currently on duloxetine and mirtazapine

## 2025-03-10 NOTE — NURSING NOTE
Kerlix+ace wraps removed. Pulses palpated. Skin dry and flaking. Edema appears controlled in lower legs.     R heel US - crusting noted on surrounding tissue, center of wound with moist white tissue covering wound bed. Small serosanguineous drainage noted.      R lat foot 3 - Full-thickness healing wound.  The wound is bright pink/red and moist.  Small to moderate amount of serosanguineous exudate is noted.  Approximate size is 1.5 x 1.5x0.1cm      L heel 3 - healing wound. Granulating. Scant serosanguineous drainage. 0.5cm circ open area. Additionally along the lateral side of the foot. There are some stage I pressure injuries and areas of nonblanchable erythema.      Bilateral lower extremities were cleansed. Lotion applied, calcium alginate applied to open wounds only, covered with silicone foam border. Wrapped with kerlix and 4in ace wraps from base of toes to below the knees. Feet placed in offloading boots upon completion.     Sacral/coccyx area with blanchable erythema. More consistent with MASD. Per primary RN patient has experienced incontinence. Recommend cleaning with bath wipe, apply barrier cream daily/prn for soiling.    [facility]  Educated about pressure injury prevention.

## 2025-03-10 NOTE — TREATMENT PLAN
Objective:    Pt w/ increased oxygen need therefore deferred mobility this date.     Precautions - Falls    Bed mobility - N/A or Not attempted.  Transfers - N/A or Not attempted.  Ambulation -  N/A or Not attempted.    Therapeutic Exercise - 10 Reps Bilaterally AAROM lying supine    Vitals: Desaturates  during supine TE    Education: Provided education on the importance of mobility in the acute care setting, Verbal/Tactile Cues, Energy conservation strategies, and HEP    Assessment: Jaja EMELY Carcamo presents with functional mobility impairments which indicate the need for skilled intervention.Tolerating session today without incident. Will continue to follow and progress as tolerated.

## 2025-03-10 NOTE — THERAPY TREATMENT NOTE
"Subjective: Pt agreeable to therapeutic plan of care.    Objective:    Pt w/ increased oxygen need therefore deferred mobility this date.     Precautions - Falls    Bed mobility - N/A or Not attempted.  Transfers - N/A or Not attempted.  Ambulation -  N/A or Not attempted.    Therapeutic Exercise - 10 Reps Bilaterally AAROM lying supine    Vitals: Desaturates  during supine TE    Education: Provided education on the importance of mobility in the acute care setting, Verbal/Tactile Cues, Energy conservation strategies, and HEP    Assessment: Jaja Carcamo presents with functional mobility impairments which indicate the need for skilled intervention.Tolerating session today without incident. Will continue to follow and progress as tolerated.     Plan/Recommendations:   If medically appropriate, Moderate Intensity Therapy recommended post-acute care. This is recommended as therapy feels the patient would require 3-4 days per week and wouldn't tolerate \"3 hour daily\" rehab intensity. SNF would be the preferred choice. If the patient does not agree to SNF, arrange HH or OP depending on home bound status. If patient is medically complex, consider LTACH. Pt requires no DME at discharge.     Pt desires Skilled Rehab placement at discharge. Pt cooperative; agreeable to therapeutic recommendations and plan of care.         Basic Mobility 6-click:  Rollin = Total, A lot = 2, A little = 3; 4 = None  Supine>Sit:   1 = Total, A lot = 2, A little = 3; 4 = None   Sit>Stand with arms:  1 = Total, A lot = 2, A little = 3; 4 = None  Bed>Chair:   1 = Total, A lot = 2, A little = 3; 4 = None  Ambulate in room:  1 = Total, A lot = 2, A little = 3; 4 = None  3-5 Steps with railin = Total, A lot = 2, A little = 3; 4 = None  Score: 6    Modified [place]: N/A = No pre-op stroke/TIA    Post-Tx Position: Supine with HOB Elevated, Staff Present, and Call light and personal items within reach  PPE: gloves, surgical mask, " and zack    Therapy Charges for Today       Code Description Service Date Service Provider Modifiers Qty    59711589347 HC PT THER PROC EA 15 MIN 3/10/2025 Lucina Rosenberg PTA GP 1    86001014552 HC PT THERAPEUTIC ACT EA 15 MIN 3/10/2025 Lucina Rosenberg PTA GP 1           PT Charges       Row Name 03/10/25 1153             Time Calculation    Start Time 1130  -CC      Stop Time 1147  -CC      Time Calculation (min) 17 min  -CC      PT Received On 03/10/25  -CC      PT - Next Appointment 03/12/25  -CC         Time Calculation- PT    Total Timed Code Minutes- PT 17 minute(s)  -CC                User Key  (r) = Recorded By, (t) = Taken By, (c) = Cosigned By      Initials Name Provider Type    CC Lucina Rosenberg PTA Physical Therapist Assistant

## 2025-03-10 NOTE — PLAN OF CARE
Goal Outcome Evaluation:  Plan of Care Reviewed With: patient        Progress: improving          Pt c/o hip and leg pain and anxiety. Currently on 7L O2. Midodrine for low BP. Will continue to monitor...

## 2025-03-11 LAB
ALBUMIN SERPL-MCNC: 3.1 G/DL (ref 3.5–5.2)
ALBUMIN/GLOB SERPL: 1.1 G/DL
ALP SERPL-CCNC: 80 U/L (ref 39–117)
ALT SERPL W P-5'-P-CCNC: <5 U/L (ref 1–33)
ANION GAP SERPL CALCULATED.3IONS-SCNC: 6.9 MMOL/L (ref 5–15)
AST SERPL-CCNC: 13 U/L (ref 1–32)
BASOPHILS # BLD AUTO: 0.03 10*3/MM3 (ref 0–0.2)
BASOPHILS NFR BLD AUTO: 0.4 % (ref 0–1.5)
BILIRUB SERPL-MCNC: 0.4 MG/DL (ref 0–1.2)
BUN SERPL-MCNC: 48 MG/DL (ref 8–23)
BUN/CREAT SERPL: 42.1 (ref 7–25)
CALCIUM SPEC-SCNC: 9.5 MG/DL (ref 8.6–10.5)
CHLORIDE SERPL-SCNC: 96 MMOL/L (ref 98–107)
CO2 SERPL-SCNC: 32.1 MMOL/L (ref 22–29)
CREAT SERPL-MCNC: 1.14 MG/DL (ref 0.57–1)
DEPRECATED RDW RBC AUTO: 65.3 FL (ref 37–54)
EGFRCR SERPLBLD CKD-EPI 2021: 49.4 ML/MIN/1.73
EOSINOPHIL # BLD AUTO: 0.44 10*3/MM3 (ref 0–0.4)
EOSINOPHIL NFR BLD AUTO: 6.4 % (ref 0.3–6.2)
ERYTHROCYTE [DISTWIDTH] IN BLOOD BY AUTOMATED COUNT: 17.9 % (ref 12.3–15.4)
GLOBULIN UR ELPH-MCNC: 2.9 GM/DL
GLUCOSE SERPL-MCNC: 106 MG/DL (ref 65–99)
HCT VFR BLD AUTO: 26.6 % (ref 34–46.6)
HGB BLD-MCNC: 7.7 G/DL (ref 12–15.9)
IMM GRANULOCYTES # BLD AUTO: 0.03 10*3/MM3 (ref 0–0.05)
IMM GRANULOCYTES NFR BLD AUTO: 0.4 % (ref 0–0.5)
LYMPHOCYTES # BLD AUTO: 1.28 10*3/MM3 (ref 0.7–3.1)
LYMPHOCYTES NFR BLD AUTO: 18.6 % (ref 19.6–45.3)
MAGNESIUM SERPL-MCNC: 2 MG/DL (ref 1.6–2.4)
MCH RBC QN AUTO: 28.9 PG (ref 26.6–33)
MCHC RBC AUTO-ENTMCNC: 28.9 G/DL (ref 31.5–35.7)
MCV RBC AUTO: 100 FL (ref 79–97)
MONOCYTES # BLD AUTO: 0.93 10*3/MM3 (ref 0.1–0.9)
MONOCYTES NFR BLD AUTO: 13.5 % (ref 5–12)
NEUTROPHILS NFR BLD AUTO: 4.19 10*3/MM3 (ref 1.7–7)
NEUTROPHILS NFR BLD AUTO: 60.7 % (ref 42.7–76)
NRBC BLD AUTO-RTO: 0 /100 WBC (ref 0–0.2)
PHOSPHATE SERPL-MCNC: 3.9 MG/DL (ref 2.5–4.5)
PLATELET # BLD AUTO: 145 10*3/MM3 (ref 140–450)
PMV BLD AUTO: 9.7 FL (ref 6–12)
POTASSIUM SERPL-SCNC: 4 MMOL/L (ref 3.5–5.2)
PROT SERPL-MCNC: 6 G/DL (ref 6–8.5)
RBC # BLD AUTO: 2.66 10*6/MM3 (ref 3.77–5.28)
SODIUM SERPL-SCNC: 135 MMOL/L (ref 136–145)
WBC NRBC COR # BLD AUTO: 6.9 10*3/MM3 (ref 3.4–10.8)

## 2025-03-11 PROCEDURE — 83735 ASSAY OF MAGNESIUM: CPT

## 2025-03-11 PROCEDURE — 63710000001 TACROLIMUS PER 1 MG: Performed by: INTERNAL MEDICINE

## 2025-03-11 PROCEDURE — 80053 COMPREHEN METABOLIC PANEL: CPT

## 2025-03-11 PROCEDURE — 85025 COMPLETE CBC W/AUTO DIFF WBC: CPT

## 2025-03-11 PROCEDURE — 94799 UNLISTED PULMONARY SVC/PX: CPT

## 2025-03-11 PROCEDURE — 99232 SBSQ HOSP IP/OBS MODERATE 35: CPT | Performed by: INTERNAL MEDICINE

## 2025-03-11 PROCEDURE — 25010000002 FUROSEMIDE PER 20 MG: Performed by: INTERNAL MEDICINE

## 2025-03-11 PROCEDURE — 84100 ASSAY OF PHOSPHORUS: CPT

## 2025-03-11 RX ORDER — METOLAZONE 5 MG/1
5 TABLET ORAL ONCE
Status: COMPLETED | OUTPATIENT
Start: 2025-03-11 | End: 2025-03-11

## 2025-03-11 RX ADMIN — SENNOSIDES AND DOCUSATE SODIUM 1 TABLET: 50; 8.6 TABLET ORAL at 08:48

## 2025-03-11 RX ADMIN — APIXABAN 5 MG: 5 TABLET, FILM COATED ORAL at 20:01

## 2025-03-11 RX ADMIN — MIDODRINE HYDROCHLORIDE 15 MG: 5 TABLET ORAL at 05:47

## 2025-03-11 RX ADMIN — FUROSEMIDE 80 MG: 10 INJECTION, SOLUTION INTRAMUSCULAR; INTRAVENOUS at 19:23

## 2025-03-11 RX ADMIN — HYDROCODONE BITARTRATE AND ACETAMINOPHEN 1 TABLET: 10; 325 TABLET ORAL at 23:05

## 2025-03-11 RX ADMIN — Medication 10 ML: at 20:03

## 2025-03-11 RX ADMIN — TACROLIMUS 0.5 MG: 0.5 CAPSULE ORAL at 08:48

## 2025-03-11 RX ADMIN — DULOXETINE 40 MG: 20 CAPSULE, DELAYED RELEASE ORAL at 08:47

## 2025-03-11 RX ADMIN — MIDODRINE HYDROCHLORIDE 15 MG: 5 TABLET ORAL at 23:05

## 2025-03-11 RX ADMIN — ACETAMINOPHEN 650 MG: 325 TABLET, FILM COATED ORAL at 19:23

## 2025-03-11 RX ADMIN — HYDROCODONE BITARTRATE AND ACETAMINOPHEN 1 TABLET: 10; 325 TABLET ORAL at 15:40

## 2025-03-11 RX ADMIN — MIDODRINE HYDROCHLORIDE 15 MG: 5 TABLET ORAL at 15:40

## 2025-03-11 RX ADMIN — METOPROLOL SUCCINATE 12.5 MG: 25 TABLET, EXTENDED RELEASE ORAL at 20:02

## 2025-03-11 RX ADMIN — Medication 10 ML: at 19:27

## 2025-03-11 RX ADMIN — Medication 10 ML: at 09:02

## 2025-03-11 RX ADMIN — HYDROCODONE BITARTRATE AND ACETAMINOPHEN 1 TABLET: 10; 325 TABLET ORAL at 05:49

## 2025-03-11 RX ADMIN — HYDROXYZINE HYDROCHLORIDE 25 MG: 25 TABLET, FILM COATED ORAL at 08:48

## 2025-03-11 RX ADMIN — FUROSEMIDE 80 MG: 10 INJECTION, SOLUTION INTRAMUSCULAR; INTRAVENOUS at 08:48

## 2025-03-11 RX ADMIN — HYDROCODONE BITARTRATE AND ACETAMINOPHEN 1 TABLET: 10; 325 TABLET ORAL at 09:01

## 2025-03-11 RX ADMIN — TACROLIMUS 0.5 MG: 0.5 CAPSULE ORAL at 20:01

## 2025-03-11 RX ADMIN — HYDROXYZINE HYDROCHLORIDE 25 MG: 25 TABLET, FILM COATED ORAL at 18:04

## 2025-03-11 RX ADMIN — Medication 1000 MCG: at 08:48

## 2025-03-11 RX ADMIN — MIRTAZAPINE 15 MG: 15 TABLET, FILM COATED ORAL at 20:01

## 2025-03-11 RX ADMIN — POLYETHYLENE GLYCOL 3350 17 G: 17 POWDER, FOR SOLUTION ORAL at 08:48

## 2025-03-11 RX ADMIN — APIXABAN 5 MG: 5 TABLET, FILM COATED ORAL at 08:48

## 2025-03-11 RX ADMIN — METOLAZONE 5 MG: 5 TABLET ORAL at 11:31

## 2025-03-11 RX ADMIN — IPRATROPIUM BROMIDE AND ALBUTEROL SULFATE 3 ML: .5; 3 SOLUTION RESPIRATORY (INHALATION) at 21:50

## 2025-03-11 RX ADMIN — LEVOTHYROXINE SODIUM 137 MCG: 0.03 TABLET ORAL at 05:48

## 2025-03-11 RX ADMIN — METOPROLOL SUCCINATE 12.5 MG: 25 TABLET, EXTENDED RELEASE ORAL at 08:48

## 2025-03-11 NOTE — PROGRESS NOTES
Referring Provider: Juanis Lowe MD    Reason for follow-up: acute CHF, hypotension, a-fib      Patient Care Team:  Kvng Guillen MD as PCP - General (Family Medicine)  Jak Gavin MD as Cardiologist (Cardiology)      SUBJECTIVE  Resting comfortably in bed.  Currently requiring 6 L of oxygen.  Blood pressure and heart rate are normal and stable      ROS  Review of all systems negative except as indicated.    Since I have last seen, the patient has been without any chest discomfort, shortness of breath, palpitations, dizziness or syncope.  Denies having any headache, abdominal pain, nausea, vomiting, diarrhea, constipation, loss of weight or loss of appetite.  Denies having any excessive bruising, hematuria or blood in the stool.        Personal History:    Past Medical History:   Diagnosis Date    Allergic rhinitis 04/14/2015    Asthma 08/10/2017    Atrial flutter 05/11/2017    Chronic diarrhea 04/15/2019    Chronic hypoxemic respiratory failure 01/18/2024    Chronic pain 02/03/2015    COPD (chronic obstructive pulmonary disease)     COVID-19 virus detected 08/11/2022    Cytokine release syndrome, grade 1 08/16/2022    Edema of both lower extremities due to peripheral venous insufficiency 03/12/2021    ESRD on hemodialysis 05/22/2013    Essential (primary) hypertension 03/03/2022    Fracture of ulnar styloid 05/19/2022    Fracture of unspecified carpal bone, right wrist, subsequent encounter for fracture with routine healing 03/03/2022    Gastroesophageal reflux disease 11/12/2020    Gout 08/10/2017    Hearing loss 08/10/2017    History of appendectomy     History of DVT (deep vein thrombosis) 11/12/2020    History of kidney transplant 06/30/2017    History of lobectomy of lung 01/18/2024 03/08/2016:  RIGHT Lower Lobe Mass--> Right Video-assisted thoracoscopy with a moderate-to-large wedge resection of the RLL (by Dr. Haris Mcconnell @ [facility])--> Poorly differentiated carcinoma of the RLL.       History of repair of hip joint 05/21/2013    History of suicide attempt 05/20/2024    Hyperlipidemia 08/11/2014    Hypothyroidism, unspecified 03/03/2022    Infection due to extended spectrum beta lactamase (ESBL) producing bacteria 03/11/2016    No A2K system hx. +ESBL E coli urine on 3/11/16.      Irritable bowel syndrome 04/15/2019    Long term (current) use of immunosuppressive biologic 04/28/2021    Long term current use of anticoagulant therapy 01/18/2024    Malignant neoplasm of lung 08/10/2017    Menopausal flushing 08/30/2021    Mitral valve regurgitation 07/06/2015    Mixed anxiety and depressive disorder 04/30/2015    Non-small cell lung cancer 08/10/2017    Nonobstructive atherosclerosis of coronary artery 06/02/2019 08/12/2022: CATH: Prashant: NSTEMI assoc with Covid-19: LM:-nl;  LAD: diffuse, Ca++; 30%; CIRC: Dominant. Normal; RCA: small; 50% diffuse, proximal and mid-segment.      NSTEMI (non-ST elevated myocardial infarction) 08/11/2022    Obsessive-compulsive disorder 04/15/2019    Osteoarthritis 05/20/2024    Paroxysmal atrial fibrillation 05/11/2017    Paroxysmal supraventricular tachycardia 05/11/2017    Peripheral neuropathy 08/11/2014    Personal history of peptic ulcer disease 11/12/2020    Postoperative anemia due to acute blood loss 05/22/2013    Right wrist fracture 03/01/2022    Seasonal allergies 08/10/2017    Secondary hyperparathyroidism of renal origin 09/14/2015    Tricuspid valve regurgitation 03/15/2017    Ulcer of lower extremity 08/30/2021    Unspecified acute appendicitis 03/03/2022    Valvular heart disease 05/20/2024    Wegener's granulomatosis with renal involvement 03/03/2022       Past Surgical History:   Procedure Laterality Date    APPENDECTOMY      ARTERIOVENOUS FISTULA/SHUNT SURGERY Right 12/3/2024    Procedure: FISTULOGRAM  and angioplasty RIGHT ARM;  Surgeon: Paulino Alva II, MD;  Location: [facility];  Service: Vascular;  Laterality: Right;    BREAST  SURGERY Left     cysts rmeoved    CARDIAC CATHETERIZATION Right 08/12/2022    Procedure: Left Heart Cath and coronary angiogram;  Surgeon: Jak Gavin MD;  Location: [facility] CATH INVASIVE LOCATION;  Service: Cardiology;  Laterality: Right;    CLOSED REDUCTION WRIST FRACTURE Right 03/01/2022    Procedure: WRIST CLOSED REDUCTION;  Surgeon: Gaudencio Townsend MD;  Location: [facility] MAIN OR;  Service: Orthopedics;  Laterality: Right;    CYSTOSCOPY      HIP ARTHROPLASTY      HYSTERECTOMY      LUNG LOBECTOMY Right     TRANSPLANTATION RENAL         Family History   Problem Relation Age of Onset    No Known Problems Mother     No Known Problems Father        Social History     Tobacco Use    Smoking status: Former     Passive exposure: Current    Smokeless tobacco: Never   Vaping Use    Vaping status: Former   Substance Use Topics    Alcohol use: Never    Drug use: Never        Home meds:  Prior to Admission medications    Medication Sig Start Date End Date Taking? Authorizing Provider   amoxicillin-clavulanate (AUGMENTIN) 875-125 MG per tablet Take 1 tablet by mouth 2 (Two) Times a Day. 3/1/25  Yes Lorenzo Morales MD   apixaban (ELIQUIS) 5 MG tablet tablet Take 1 tablet by mouth Every 12 (Twelve) Hours. 8/16/22  Yes Clyde White DO   DULoxetine HCl 40 MG capsule delayed-release particles Take 1 capsule by mouth Daily. Indications: Major Depressive Disorder 7/11/24  Yes Court Vences MD   ferrous sulfate 325 (65 FE) MG tablet Take 1 tablet by mouth 3 times a day.   Yes Court Vences MD   furosemide (LASIX) 40 MG tablet Take 1 tablet by mouth 2 (Two) Times a Day.   Yes Court Vences MD   hydrOXYzine (ATARAX) 25 MG tablet Take 1 tablet by mouth Every Night.   Yes Court Vences MD   LORazepam (ATIVAN) 0.5 MG tablet Take 1 tablet by mouth Every 6 (Six) Hours.   Yes Court Vences MD   metoprolol tartrate (LOPRESSOR) 25 MG tablet Take 0.5 tablets by mouth 2 (Two) Times a Day.   Yes  Court Vences MD   midodrine (PROAMATINE) 5 MG tablet Take 3 tablets by mouth 3 (Three) Times a Day Before Meals.   Yes Court Vences MD   mirtazapine (REMERON) 15 MG tablet Take 1 tablet by mouth Every Night.   Yes Court Vences MD   polyethylene glycol (MiraLax) 17 GM/SCOOP powder Take 17 g by mouth Daily.   Yes Court Vences MD   predniSONE (DELTASONE) 5 MG tablet Take 1 tablet by mouth Daily. Indications: ACUTE EXACERBATION OF COPD (INACTIVE) 7/11/24  Yes Court Vences MD   sacubitril-valsartan (Entresto) 24-26 MG tablet Take 1 tablet by mouth 2 (Two) Times a Day.   Yes Court Vences MD   sennosides-docusate (PERICOLACE) 8.6-50 MG per tablet Take 1 tablet by mouth 2 (Two) Times a Day.   Yes Court Vences MD   simvastatin (ZOCOR) 20 MG tablet Take 1 tablet by mouth Every Night.   Yes Court Vences MD   tacrolimus (PROGRAF) 0.5 MG capsule Take 1 capsule by mouth 2 (Two) Times a Day. 6/13/24  Yes Janene Archer APRN   vitamin B-12 (CYANOCOBALAMIN) 1000 MCG tablet Take 1 tablet by mouth Daily.   Yes Court Vences MD   vitamin D (ERGOCALCIFEROL) 1.25 MG (26706 UT) capsule capsule Take 1 capsule by mouth 1 (One) Time Per Week.   Yes Court Vences MD   acetaminophen (Tylenol) 325 MG tablet Take 2 tablets by mouth Every 4 (Four) Hours As Needed for Fever or Mild Pain. Indications: Pain 3/13/20   Court Vences MD   albuterol sulfate  (90 Base) MCG/ACT inhaler Inhale 2 puffs Every 6 (Six) Hours As Needed for Wheezing.    Court Vences MD   HYDROcodone-acetaminophen (NORCO)  MG per tablet Take 1 tablet by mouth Every 6 (Six) Hours.    Court Vences MD       Allergies:  Zolpidem    Scheduled Meds:apixaban, 5 mg, Oral, Q12H  DULoxetine, 40 mg, Oral, Daily  furosemide, 80 mg, Intravenous, Q12H  HYDROcodone-acetaminophen, 1 tablet, Oral, Q6H  levothyroxine, 137 mcg, Oral, Q AM  metoprolol succinate XL, 12.5  "mg, Oral, Q12H  midodrine, 15 mg, Oral, Q8H  mirtazapine, 15 mg, Oral, Nightly  polyethylene glycol, 17 g, Oral, Daily  sennosides-docusate, 1 tablet, Oral, BID  sodium chloride, 10 mL, Intravenous, Q12H  sodium chloride, 10 mL, Intravenous, Q12H  tacrolimus, 0.5 mg, Oral, BID  vitamin B-12, 1,000 mcg, Oral, Daily      Continuous Infusions:   PRN Meds:.  acetaminophen **OR** acetaminophen    albuterol    aluminum-magnesium hydroxide-simethicone    senna-docusate sodium **AND** polyethylene glycol **AND** bisacodyl **AND** bisacodyl    hydrOXYzine    ipratropium-albuterol    [Held by provider] LORazepam    nitroglycerin    ondansetron ODT **OR** ondansetron    prochlorperazine    [COMPLETED] Insert Peripheral IV **AND** sodium chloride    sodium chloride    sodium chloride    sodium chloride    sodium chloride    sodium chloride      OBJECTIVE    Vital Signs  Vitals:    03/10/25 2039 03/10/25 2345 03/11/25 0307 03/11/25 0552   BP:  116/52 119/70    BP Location:  Left arm Left arm    Patient Position:  Lying Lying    Pulse: 120 107 101    Resp: 18 24 16    Temp:  99 °F (37.2 °C) 98 °F (36.7 °C)    TempSrc:  Oral Axillary    SpO2: 98% 100% 96%    Weight:   83.5 kg (184 lb) 75.3 kg (166 lb)   Height:           Flowsheet Rows      Flowsheet Row First Filed Value   Admission Height 167.7 cm (66.02\") Documented at 03/03/2025 1641   Admission Weight 81.6 kg (179 lb 14.3 oz) Documented at 03/03/2025 1641              Intake/Output Summary (Last 24 hours) at 3/11/2025 0720  Last data filed at 3/11/2025 0552  Gross per 24 hour   Intake 120 ml   Output 1300 ml   Net -1180 ml          Telemetry: Atrial fibrillation with rapid ventricular rate    Physical Exam:  The patient is alert and in no distress. She is frail and appears chronically ill.   Vital signs as noted above.  Head and neck revealed no carotid bruits or jugular venous distention.   Lungs with scattered rhonchi and diminished bases.  No wheezing.  On oxygen at 4 " liters per high flow nasal cannula.   Heart normal first and second heart sounds.  No murmur. No precordial rub is present.  No gallop is present.  Abdomen soft and nontender.    Extremities with good peripheral pulses with 1+ edema in BLE.  Skin warm and dry.  Musculoskeletal system is grossly normal.  CNS:  she is oriented to person only      Results Review:  I have personally reviewed the results from the time of this admission to 3/11/2025 07:20 EDT and agree with these findings:  [x]  Laboratory  [x]  Microbiology  [x]  Radiology  [x]  EKG/Telemetry   [x]  Cardiology/Vascular   []  Pathology  [x]  Old records  []  Other:    Most notable findings include:    Lab Results (last 24 hours)       Procedure Component Value Units Date/Time    Comprehensive Metabolic Panel [[id number]]  (Abnormal) Collected: 03/11/25 0310    Specimen: Blood Updated: 03/11/25 0535     Glucose 106 mg/dL      BUN 48 mg/dL      Creatinine 1.14 mg/dL      Sodium 135 mmol/L      Potassium 4.0 mmol/L      Chloride 96 mmol/L      CO2 32.1 mmol/L      Calcium 9.5 mg/dL      Total Protein 6.0 g/dL      Albumin 3.1 g/dL      ALT (SGPT) <5 U/L      Comment: Result checked AND RERAN        AST (SGOT) 13 U/L      Alkaline Phosphatase 80 U/L      Total Bilirubin 0.4 mg/dL      Globulin 2.9 gm/dL      A/G Ratio 1.1 g/dL      BUN/Creatinine Ratio 42.1     Anion Gap 6.9 mmol/L      eGFR 49.4 mL/min/1.73     Narrative:      GFR Categories in Chronic Kidney Disease (CKD)      GFR Category          GFR (mL/min/1.73)    Interpretation  G1                     90 or greater         Normal or high (1)  G2                      60-89                Mild decrease (1)  G3a                   45-59                Mild to moderate decrease  G3b                   30-44                Moderate to severe decrease  G4                    15-29                Severe decrease  G5                    14 or less           Kidney failure          (1)In the absence of evidence of  kidney disease, neither GFR category G1 or G2 fulfill the criteria for CKD.    eGFR calculation 2021 CKD-EPI creatinine equation, which does not include race as a factor    Magnesium [[id number]]  (Normal) Collected: 03/11/25 0310    Specimen: Blood Updated: 03/11/25 0459     Magnesium 2.0 mg/dL     Phosphorus [[id number]]  (Normal) Collected: 03/11/25 0310    Specimen: Blood Updated: 03/11/25 0445     Phosphorus 3.9 mg/dL     CBC & Differential [[id number]]  (Abnormal) Collected: 03/11/25 0310    Specimen: Blood Updated: 03/11/25 0429    Narrative:      The following orders were created for panel order CBC & Differential.  Procedure                               Abnormality         Status                     ---------                               -----------         ------                     CBC Auto Differential[[id number]]        Abnormal            Final result                 Please view results for these tests on the individual orders.    CBC Auto Differential [[id number]]  (Abnormal) Collected: 03/11/25 0310    Specimen: Blood Updated: 03/11/25 0429     WBC 6.90 10*3/mm3      RBC 2.66 10*6/mm3      Hemoglobin 7.7 g/dL      Hematocrit 26.6 %      .0 fL      MCH 28.9 pg      MCHC 28.9 g/dL      RDW 17.9 %      RDW-SD 65.3 fl      MPV 9.7 fL      Platelets 145 10*3/mm3      Neutrophil % 60.7 %      Lymphocyte % 18.6 %      Monocyte % 13.5 %      Eosinophil % 6.4 %      Basophil % 0.4 %      Immature Grans % 0.4 %      Neutrophils, Absolute 4.19 10*3/mm3      Lymphocytes, Absolute 1.28 10*3/mm3      Monocytes, Absolute 0.93 10*3/mm3      Eosinophils, Absolute 0.44 10*3/mm3      Basophils, Absolute 0.03 10*3/mm3      Immature Grans, Absolute 0.03 10*3/mm3      nRBC 0.0 /100 WBC     POC Glucose Once [[id number]]  (Normal) Collected: 03/10/25 0828    Specimen: Blood Updated: 03/10/25 0829     Glucose 89 mg/dL      Comment: Serial Number: [id number]:  [id number]               Imaging Results (Last 24 Hours)        ** No results found for the last 24 hours. **            LAB RESULTS (LAST 7 DAYS)    CBC  Results from last 7 days   Lab Units 03/11/25 0310 03/09/25  2334 03/09/25  1003 03/08/25  0544 03/07/25  0541 03/06/25  0907 03/05/25  0744   WBC 10*3/mm3 6.90 7.04 7.45 7.03 8.45 7.20 8.11   RBC 10*6/mm3 2.66* 2.59* 2.73* 2.80* 2.81* 2.88* 2.73*   HEMOGLOBIN g/dL 7.7* 7.7* 8.0* 8.3* 8.4* 8.6* 8.1*   HEMATOCRIT % 26.6* 26.3* 27.6* 28.4* 28.7* 29.6* 28.1*   MCV fL 100.0* 101.5* 101.1* 101.4* 102.1* 102.8* 102.9*   PLATELETS 10*3/mm3 145 149 148 132* 161 167 168       BMP  Results from last 7 days   Lab Units 03/11/25 0310 03/09/25 2334 03/09/25  1003 03/08/25  0544 03/07/25  0541 03/06/25  0907 03/05/25  0744   SODIUM mmol/L 135* 134* 135* 136 136 136 132*   POTASSIUM mmol/L 4.0 4.5 4.4 4.7 4.7 4.9 5.1   CHLORIDE mmol/L 96* 96* 97* 101 97* 98 97*   CO2 mmol/L 32.1* 28.8 29.0 26.1 28.0 30.6* 26.8   BUN mg/dL 48* 47* 48* 48* 50* 51* 51*   CREATININE mg/dL 1.14* 1.14* 1.14* 1.19* 1.41* 1.75* 1.91*   GLUCOSE mg/dL 106* 103* 131* 76 83 96 99   MAGNESIUM mg/dL 2.0 2.0 1.9 1.8 2.0 2.0 2.0   PHOSPHORUS mg/dL 3.9 3.8 3.8 3.2 3.3 3.3 3.3       CMP   Results from last 7 days   Lab Units 03/11/25  0310 03/09/25  2334 03/09/25  1003 03/08/25  0544 03/07/25  0541 03/06/25  0907 03/05/25  0744   SODIUM mmol/L 135* 134* 135* 136 136 136 132*   POTASSIUM mmol/L 4.0 4.5 4.4 4.7 4.7 4.9 5.1   CHLORIDE mmol/L 96* 96* 97* 101 97* 98 97*   CO2 mmol/L 32.1* 28.8 29.0 26.1 28.0 30.6* 26.8   BUN mg/dL 48* 47* 48* 48* 50* 51* 51*   CREATININE mg/dL 1.14* 1.14* 1.14* 1.19* 1.41* 1.75* 1.91*   GLUCOSE mg/dL 106* 103* 131* 76 83 96 99   ALBUMIN g/dL 3.1* 3.0* 3.0* 2.9* 3.1* 3.2* 3.1*   BILIRUBIN mg/dL 0.4 0.4 0.5 0.4 0.5 0.5 0.4   ALK PHOS U/L 80 76 74 70 72 75 69   AST (SGOT) U/L 13 13 13 22 13 15 13   ALT (SGPT) U/L <5 5 <5 <5 <5 <5 <5       BNP        TROPONIN          CoAg        Creatinine Clearance  Estimated Creatinine Clearance: 42.2  mL/min (A) (by C-G formula based on SCr of 1.14 mg/dL (H)).    ABG          Radiology  No radiology results for the last day      EKG  I personally viewed and interpreted the patient's EKG/Telemetry data:  ECG 12 Lead Altered Mental Status   Final Result   HEART RATE=96  bpm   RR Qnrsxxbq=281  ms   FL Interval=  ms   P Horizontal Axis=  deg   P Front Axis=  deg   QRSD Interval=80  ms   QT Txsjhbrk=628  ms   UUhX=859  ms   QRS Axis=-39  deg   T Wave Axis=90  deg   - ABNORMAL ECG -   Atrial fibrillation   Ventricular premature complex   Left axis deviation   Anteroseptal  infarct, age indeterminate   When compared with ECG of 01-Mar-2025 09:40:43,   Significant axis, voltage or hypertrophy change   Electronically Signed By: Paulino Miller (TWILA) 2025-03-04 07:28:16   Date and Time of Study:2025-03-03 16:45:51      Telemetry Scan   Final Result      Telemetry Scan   Final Result      Telemetry Scan   Final Result      Telemetry Scan   Final Result      Telemetry Scan   Final Result      Telemetry Scan   Final Result      Telemetry Scan   Final Result            Echocardiogram:    Results for orders placed during the hospital encounter of 11/29/24    Adult Transthoracic Echo Complete W/ Cont if Necessary Per Protocol    Interpretation Summary    Left ventricular systolic function is normal. Calculated left ventricular EF = 70% Left ventricular ejection fraction appears to be 66 - 70%.    Left ventricular wall thickness is consistent with moderate to severe concentric hypertrophy.    Left ventricular diastolic function is consistent with (grade III w/high LAP) reversible restrictive pattern.    Moderately reduced right ventricular systolic function noted.    The right ventricular cavity is severely dilated.    The left atrial cavity is severely dilated.    The right atrial cavity is severely  dilated.    There is mild calcification of the aortic valve mainly affecting the left coronary and right coronary cusp(s).     Severe tricuspid valve regurgitation is present.    Estimated right ventricular systolic pressure from tricuspid regurgitation is markedly elevated (>55 mmHg).    Severe pulmonary hypertension is present.    There is a moderate sized left pleural effusion.        Stress Test:         Cardiac Catheterization:  Results for orders placed during the hospital encounter of 08/11/22    Cardiac Catheterization/Vascular Study    Conclusion  IMPRESSIONS  Non obstructive CAD         Other:         ASSESSMENT & PLAN:    Principal Problem:    UTI (urinary tract infection)  Active Problems:    COPD (chronic obstructive pulmonary disease)    Hypothyroidism, unspecified    History of kidney transplant    Long term (current) use of immunosuppressive biologic    Long term current use of anticoagulant therapy    History of DVT (deep vein thrombosis)    Pulmonary hypertension    Decubitus ulcer of foot, stage 3    Edema of both lower extremities due to peripheral venous insufficiency    Mixed anxiety and depressive disorder    Essential (primary) hypertension    Paroxysmal atrial fibrillation    Acute exacerbation of CHF (congestive heart failure)    End stage renal disease    Edema of right upper arm    TONYA (acute kidney injury)    Pressure injury of right heel, stage 3      Atrial fibrillation with rapid ventricular rate  Heart rate is better now  Continue Toprol-XL  [id number] score is 5  Continue Eliquis      Acute on chronic HFpEF / Pulmonary hypertension  Echocardiogram shows preserved LV function with severe tricuspid valve regurgitation and severe pulmonary hypertension.  Presented with proBNP of 17,000 (compared to 4800 during previous admission)  Currently on IV diuretics.  Dosing per nephrology  Metolazone as needed  Unable to add GDMT due to renal dysfunction and hypotension requiring midodrine  Strict I&Os and daily weight     Hypotension  Requiring midodrine  Entresto is on hold     History of kidney transplant / TONYA on  CKD  AV fistula in place, but no on hemodialysis   On tacrolimus  Creatinine stable 1.14, GFR is 49.4/  Closely monitor renal function while on diuretics     Right IJ DVT  Diagnosed in December 2024  Repeat venous duplex is negative for DVT  VQ scan with low probability of PE  Continue Eliquis     Anemia in CKD   H&H 7.7/26.6  Monitor H&H while on anticoagulation  Transfuse as needed     COPD  History of lung cancer  10 L of oxygen  Continue bronchodilators     Anxiety/depression  Currently on duloxetine and mirtazapine

## 2025-03-11 NOTE — PLAN OF CARE
Goal Outcome Evaluation:           Progress: no change     Pt very anxious and stressed out throughout the night. Pt on 8 liters of oxygen, now sleeping comfortably throughout the night with no complaints. Safety precautions in place and will continue to monitor.

## 2025-03-11 NOTE — CASE MANAGEMENT/SOCIAL WORK
Continued Stay Note  DAHIANA Gay     Patient Name: Jaja Carcamo  MRN: [id number]  Today's Date: 3/11/2025    Admit Date: 3/3/2025    Plan: Return to [facility]. Precert pending for Skilled, started 3/7 pending 3/10. Can return pending. No PASRR.   Discharge Plan       Row Name 03/11/25 1629       Plan    Plan Comments Barrier to D/C: 6L HF 02; Pallative Consult                 Expected Discharge Date and Time       Expected Discharge Date Expected Discharge Time    Mar 11, 2025               Claire Castro RN  RN/.  Office Ph. [phone]  Cell Ph.  [phone]

## 2025-03-11 NOTE — PROGRESS NOTES
LOS: 8 days   Patient Care Team:  Kvng Guillen MD as PCP - General (Family Medicine)  Jak Gavin MD as Cardiologist (Cardiology)    Subjective     Patient denies any new complaints    Review of Systems   Constitutional:  Positive for activity change, appetite change and fatigue.   HENT: Negative.     Respiratory: Negative.     Cardiovascular:  Positive for leg swelling.   Gastrointestinal: Negative.    Genitourinary: Negative.    Musculoskeletal:  Positive for gait problem.   Neurological:  Positive for weakness.   Psychiatric/Behavioral: Negative.             Objective     Vital Signs  Temp:  [97.3 °F (36.3 °C)-99 °F (37.2 °C)] 97.3 °F (36.3 °C)  Heart Rate:  [101-120] 102  Resp:  [15-24] 15  BP: ()/(52-70) 92/62      Physical Exam  Vitals reviewed.   Constitutional:       Appearance: She is not ill-appearing.   HENT:      Head: Normocephalic and atraumatic.      Right Ear: External ear normal.      Left Ear: External ear normal.      Nose: Nose normal.      Mouth/Throat:      Mouth: Mucous membranes are dry.   Eyes:      General:         Right eye: No discharge.         Left eye: No discharge.   Cardiovascular:      Rate and Rhythm: Normal rate and regular rhythm.      Pulses: Normal pulses.      Heart sounds: Normal heart sounds.   Pulmonary:      Effort: Pulmonary effort is normal.      Breath sounds: Normal breath sounds.   Abdominal:      General: Bowel sounds are normal.      Palpations: Abdomen is soft.   Musculoskeletal:         General: Normal range of motion.      Cervical back: Normal range of motion.   Skin:     General: Skin is warm and dry.   Neurological:      Mental Status: She is alert and oriented to person, place, and time.   Psychiatric:         Behavior: Behavior normal.              Results Review:    Lab Results (last 24 hours)       Procedure Component Value Units Date/Time    Comprehensive Metabolic Panel [[id number]]  (Abnormal) Collected: 03/11/25 0310    Specimen: Blood  Updated: 03/11/25 0535     Glucose 106 mg/dL      BUN 48 mg/dL      Creatinine 1.14 mg/dL      Sodium 135 mmol/L      Potassium 4.0 mmol/L      Chloride 96 mmol/L      CO2 32.1 mmol/L      Calcium 9.5 mg/dL      Total Protein 6.0 g/dL      Albumin 3.1 g/dL      ALT (SGPT) <5 U/L      Comment: Result checked AND RERAN        AST (SGOT) 13 U/L      Alkaline Phosphatase 80 U/L      Total Bilirubin 0.4 mg/dL      Globulin 2.9 gm/dL      A/G Ratio 1.1 g/dL      BUN/Creatinine Ratio 42.1     Anion Gap 6.9 mmol/L      eGFR 49.4 mL/min/1.73     Narrative:      GFR Categories in Chronic Kidney Disease (CKD)      GFR Category          GFR (mL/min/1.73)    Interpretation  G1                     90 or greater         Normal or high (1)  G2                      60-89                Mild decrease (1)  G3a                   45-59                Mild to moderate decrease  G3b                   30-44                Moderate to severe decrease  G4                    15-29                Severe decrease  G5                    14 or less           Kidney failure          (1)In the absence of evidence of kidney disease, neither GFR category G1 or G2 fulfill the criteria for CKD.    eGFR calculation 2021 CKD-EPI creatinine equation, which does not include race as a factor    Magnesium [[id number]]  (Normal) Collected: 03/11/25 0310    Specimen: Blood Updated: 03/11/25 0459     Magnesium 2.0 mg/dL     Phosphorus [[id number]]  (Normal) Collected: 03/11/25 0310    Specimen: Blood Updated: 03/11/25 0445     Phosphorus 3.9 mg/dL     CBC & Differential [[id number]]  (Abnormal) Collected: 03/11/25 0310    Specimen: Blood Updated: 03/11/25 0429    Narrative:      The following orders were created for panel order CBC & Differential.  Procedure                               Abnormality         Status                     ---------                               -----------         ------                     CBC Auto Differential[[id number]]         Abnormal            Final result                 Please view results for these tests on the individual orders.    CBC Auto Differential [[id number]]  (Abnormal) Collected: 03/11/25 0310    Specimen: Blood Updated: 03/11/25 0429     WBC 6.90 10*3/mm3      RBC 2.66 10*6/mm3      Hemoglobin 7.7 g/dL      Hematocrit 26.6 %      .0 fL      MCH 28.9 pg      MCHC 28.9 g/dL      RDW 17.9 %      RDW-SD 65.3 fl      MPV 9.7 fL      Platelets 145 10*3/mm3      Neutrophil % 60.7 %      Lymphocyte % 18.6 %      Monocyte % 13.5 %      Eosinophil % 6.4 %      Basophil % 0.4 %      Immature Grans % 0.4 %      Neutrophils, Absolute 4.19 10*3/mm3      Lymphocytes, Absolute 1.28 10*3/mm3      Monocytes, Absolute 0.93 10*3/mm3      Eosinophils, Absolute 0.44 10*3/mm3      Basophils, Absolute 0.03 10*3/mm3      Immature Grans, Absolute 0.03 10*3/mm3      nRBC 0.0 /100 WBC              Imaging Results (Last 24 Hours)       ** No results found for the last 24 hours. **                 I reviewed the patient's new clinical results.    Medication Review:   Scheduled Meds:apixaban, 5 mg, Oral, Q12H  DULoxetine, 40 mg, Oral, Daily  furosemide, 80 mg, Intravenous, Q12H  HYDROcodone-acetaminophen, 1 tablet, Oral, Q6H  levothyroxine, 137 mcg, Oral, Q AM  metOLazone, 5 mg, Oral, Once  metoprolol succinate XL, 12.5 mg, Oral, Q12H  midodrine, 15 mg, Oral, Q8H  mirtazapine, 15 mg, Oral, Nightly  polyethylene glycol, 17 g, Oral, Daily  sennosides-docusate, 1 tablet, Oral, BID  sodium chloride, 10 mL, Intravenous, Q12H  sodium chloride, 10 mL, Intravenous, Q12H  tacrolimus, 0.5 mg, Oral, BID  vitamin B-12, 1,000 mcg, Oral, Daily      Continuous Infusions:   PRN Meds:.  acetaminophen **OR** acetaminophen    albuterol    aluminum-magnesium hydroxide-simethicone    senna-docusate sodium **AND** polyethylene glycol **AND** bisacodyl **AND** bisacodyl    hydrOXYzine    ipratropium-albuterol    [Held by provider] LORazepam    nitroglycerin     ondansetron ODT **OR** ondansetron    prochlorperazine    [COMPLETED] Insert Peripheral IV **AND** sodium chloride    sodium chloride    sodium chloride    sodium chloride    sodium chloride    sodium chloride     Interval History:    Assessment & Plan     UTI (urinary tract infection)  Acute exacerbation of CHF (congestive heart failure)  End stage renal disease  Edema of right upper arm  TONYA (acute kidney injury)  Pressure injury of right heel, stage 3  Edema of both lower extremities due to peripheral venous insufficiency  COPD (chronic obstructive pulmonary disease)  Hypothyroidism, unspecified  History of kidney transplant  Long term (current) use of immunosuppressive biologic  Long term current use of anticoagulant therapy  History of DVT (deep vein thrombosis)  Pulmonary hypertension  Mixed anxiety and depressive disorder  Essential (primary) hypertension  Paroxysmal atrial fibrillation    Plan of care  Urine culture negative afebrile no leukocytosis will dc rocephin. Nephrology following for diuresis and management. Creatinine is improving/ continue Prograf and midodrine for support.  She does have working fistula if needed for HD. Appreciate wound care recommendations.    Nephrology transitioned back to IV diuretic. Continue current plan of care and medication    Patient is DNR/DNI/ today patient concern was that she does not have a quality of life. Son does help with decisions, will ask palliative to follow for further discussions    Plan for disposition::back to [facility]     Janene Archer, TONYA  03/11/25  10:34 EDT

## 2025-03-11 NOTE — CONSULTS
Palliative Care Social Work Progress Note    Code Status:do not resuscitate    Goals of Care: Limited Additonal Intervention     Narrative: Palliative care  attempted to meet with patient to discuss goals of care but she requested a follow up tomorrow since she kept dosing off. Patient seen by palliative team on a previous admission and a Pallitus referral was made. Per Meenu Langston, they attempted contact on 12/23 and 12/30 and son stated he would call back if services were needed. Will attempt to revisit with patient.     Plan: Palliative care team following          Emily England

## 2025-03-11 NOTE — PLAN OF CARE
Problem: Violence Risk or Actual  Goal: Anger and Impulse Control  Intervention: Minimize Safety Risk  Recent Flowsheet Documentation  Taken 3/11/2025 1600 by Philippe Dodge RN  Enhanced Safety Measures: bed alarm set  Taken 3/11/2025 1400 by Philippe Dodge RN  Enhanced Safety Measures: bed alarm set  Taken 3/11/2025 1200 by Philippe Dodge RN  Enhanced Safety Measures: bed alarm set  Taken 3/11/2025 1000 by Philippe Dodge RN  Enhanced Safety Measures: bed alarm set  Taken 3/11/2025 0800 by Philippe Dodge RN  Enhanced Safety Measures: bed alarm set     Problem: Adult Inpatient Plan of Care  Goal: Absence of Hospital-Acquired Illness or Injury  Intervention: Identify and Manage Fall Risk  Recent Flowsheet Documentation  Taken 3/11/2025 1600 by Philippe Dodge RN  Safety Promotion/Fall Prevention: safety round/check completed  Taken 3/11/2025 1400 by Philippe Dodge RN  Safety Promotion/Fall Prevention: safety round/check completed  Taken 3/11/2025 1200 by Philippe Dodge RN  Safety Promotion/Fall Prevention: safety round/check completed  Taken 3/11/2025 1000 by Philippe Dodge RN  Safety Promotion/Fall Prevention: safety round/check completed  Taken 3/11/2025 0800 by Philippe Dodge RN  Safety Promotion/Fall Prevention: safety round/check completed  Intervention: Prevent Skin Injury  Recent Flowsheet Documentation  Taken 3/11/2025 1600 by Philippe Dodge RN  Skin Protection: incontinence pads utilized  Taken 3/11/2025 1200 by Philippe Dodge RN  Skin Protection: incontinence pads utilized  Taken 3/11/2025 1100 by Philippe Dodge RN  Body Position:   turned   left  Taken 3/11/2025 0800 by Philippe Dodge RN  Skin Protection: incontinence pads utilized  Intervention: Prevent and Manage VTE (Venous Thromboembolism) Risk  Recent Flowsheet Documentation  Taken 3/11/2025 0800 by Philippe Dodge RN  VTE Prevention/Management: (patient on eliquis) other (see  comments)  Intervention: Prevent Infection  Recent Flowsheet Documentation  Taken 3/11/2025 1600 by Philippe Dodge RN  Infection Prevention:   environmental surveillance performed   single patient room provided  Taken 3/11/2025 1400 by Philippe Dodge RN  Infection Prevention:   environmental surveillance performed   single patient room provided  Taken 3/11/2025 1200 by Philippe Dodge RN  Infection Prevention:   environmental surveillance performed   single patient room provided  Taken 3/11/2025 1000 by Philippe Dodge RN  Infection Prevention:   environmental surveillance performed   single patient room provided  Taken 3/11/2025 0800 by Philippe Dodge RN  Infection Prevention:   environmental surveillance performed   single patient room provided  Goal: Optimal Comfort and Wellbeing  Intervention: Provide Person-Centered Care  Recent Flowsheet Documentation  Taken 3/11/2025 0800 by Philippe Dodge RN  Trust Relationship/Rapport: care explained     Problem: Skin Injury Risk Increased  Goal: Skin Health and Integrity  Intervention: Optimize Skin Protection  Recent Flowsheet Documentation  Taken 3/11/2025 1600 by Philippe Dodge RN  Pressure Reduction Techniques: frequent weight shift encouraged  Pressure Reduction Devices: positioning supports utilized  Skin Protection: incontinence pads utilized  Taken 3/11/2025 1200 by Philippe Dodge RN  Pressure Reduction Techniques: frequent weight shift encouraged  Pressure Reduction Devices: pressure-redistributing mattress utilized  Skin Protection: incontinence pads utilized  Taken 3/11/2025 0800 by Philippe Dodge RN  Pressure Reduction Techniques: frequent weight shift encouraged  Pressure Reduction Devices: positioning supports utilized  Skin Protection: incontinence pads utilized     Problem: Fall Injury Risk  Goal: Absence of Fall and Fall-Related Injury  Intervention: Promote Injury-Free Environment  Recent Flowsheet Documentation  Taken  3/11/2025 1600 by Philippe Dodge RN  Safety Promotion/Fall Prevention: safety round/check completed  Taken 3/11/2025 1400 by Philippe Dodge RN  Safety Promotion/Fall Prevention: safety round/check completed  Taken 3/11/2025 1200 by Philippe Dodge RN  Safety Promotion/Fall Prevention: safety round/check completed  Taken 3/11/2025 1000 by Philippe Dodge RN  Safety Promotion/Fall Prevention: safety round/check completed  Taken 3/11/2025 0800 by Philippe Dodge RN  Safety Promotion/Fall Prevention: safety round/check completed     Problem: Sepsis/Septic Shock  Goal: Absence of Infection Signs and Symptoms  Intervention: Initiate Sepsis Management  Recent Flowsheet Documentation  Taken 3/11/2025 1600 by Philippe Dodge RN  Infection Prevention:   environmental surveillance performed   single patient room provided  Isolation Precautions:   precautions maintained   droplet  Taken 3/11/2025 1400 by Philippe Dodge RN  Infection Prevention:   environmental surveillance performed   single patient room provided  Isolation Precautions:   precautions maintained   droplet  Taken 3/11/2025 1200 by Philippe Dodge RN  Infection Prevention:   environmental surveillance performed   single patient room provided  Isolation Precautions:   precautions maintained   droplet  Taken 3/11/2025 1000 by Philippe Dodge RN  Infection Prevention:   environmental surveillance performed   single patient room provided  Isolation Precautions:   precautions maintained   droplet  Taken 3/11/2025 0800 by Philippe Dodge RN  Infection Prevention:   environmental surveillance performed   single patient room provided  Isolation Precautions:   precautions maintained   droplet   Goal Outcome Evaluation:

## 2025-03-11 NOTE — DISCHARGE PLACEMENT REQUEST
"Gene Carcamo (80 y.o. Female)       Date of Birth   1945    Social Security Number       Address   [address] [place] [address] [place] [facility]    Home Phone   [phone]    MRN   [id number]       Restoration   None    Marital Status                               Admission Date   3/3/2025    Admission Type   Emergency    Admitting Provider   Nilsa Lomeli MD    Attending Provider   Juanis Lowe MD    Department, Room/Bed   [facility] 2D, 259/1       Discharge Date       Discharge Disposition       Discharge Destination                                 Attending Provider: Juanis Lowe MD    Allergies: Zolpidem    Isolation: Contact   Infection: ESBL (06/07/24), ESBL E coli (06/07/24)   Code Status: No CPR    Ht: 167.6 cm (66\")   Wt: 75.3 kg (166 lb)    Admission Cmt: None   Principal Problem: UTI (urinary tract infection) [N39.0]                   Active Insurance as of 3/3/2025       Primary Coverage       Payor Plan Insurance Group Employer/Plan Group    [employer] MEDICARE REPLACEMENT [employer] MEDICARE ADVANTAGE GROUP [id number]       Payor Plan Address Payor Plan Phone Number Payor Plan Fax Number Effective Dates    [address] [address]   1/1/2024 - None Entered    [facility] [id number]         Subscriber Name Subscriber Birth Date Member ID       GENE CARCAMO 1945 [id number]                     Emergency Contacts        (Rel.) Home Phone Work Phone Mobile Phone    RENNY CARCAMO (Son) [phone] -- [phone]    goldy potter (Grandchild) [phone] -- [phone]    OnealDamaris (Sister) -- -- [phone]              Operative/Procedure Notes (all)    No notes of this type exist for this encounter.          Physician Progress Notes (last 48 hours)        Naun Falcon MD at 03/11/25 0904          RENAL/[facility]:     LOS: 8 days    Patient Care Team:  Kvng Guillen MD as PCP - General (Family Medicine)  Jak Gavin MD as Cardiologist " "(Cardiology)    Chief Complaint:  TONYA/CKD, Kidney Transplant    Subjective     Interval History:   Chart reviewed  Anxious   O2 narrowly better    Objective     Vital Sign Min/Max for last 24 hours  Temp  Min: 97.3 °F (36.3 °C)  Max: 99 °F (37.2 °C)   BP  Min: 92/62  Max: 119/70   Pulse  Min: 94  Max: 120   Resp  Min: 15  Max: 24   SpO2  Min: 90 %  Max: 100 %   Flow (L/min) (Oxygen Therapy)  Min: 6  Max: 10   Weight  Min: 75.3 kg (166 lb)  Max: 83.5 kg (184 lb)     Flowsheet Rows      Flowsheet Row First Filed Value   Admission Height 167.7 cm (66.02\") Documented at 03/03/2025 1641   Admission Weight 81.6 kg (179 lb 14.3 oz) Documented at 03/03/2025 1641            No intake/output data recorded.  I/O last 3 completed shifts:  In: 120 [P.O.:120]  Out: 1700 [Urine:1700]    Physical Exam:  GEN: Awake, Anxious  ENT: PERRL, EOMI, MMM  NECK: Supple, no JVD  CHEST: CTAB, no W/R/C  CV: RRR, no M/G/R, +edema  ABD: Soft, NT, +BS  SKIN: Warm and Dry  NEURO: CN's intact      WBC WBC   Date Value Ref Range Status   03/11/2025 6.90 3.40 - 10.80 10*3/mm3 Final   03/09/2025 7.04 3.40 - 10.80 10*3/mm3 Final   03/09/2025 7.45 3.40 - 10.80 10*3/mm3 Final      HGB Hemoglobin   Date Value Ref Range Status   03/11/2025 7.7 (L) 12.0 - 15.9 g/dL Final   03/09/2025 7.7 (L) 12.0 - 15.9 g/dL Final   03/09/2025 8.0 (L) 12.0 - 15.9 g/dL Final      HCT Hematocrit   Date Value Ref Range Status   03/11/2025 26.6 (L) 34.0 - 46.6 % Final   03/09/2025 26.3 (L) 34.0 - 46.6 % Final   03/09/2025 27.6 (L) 34.0 - 46.6 % Final      Platlets No results found for: \"LABPLAT\"   MCV MCV   Date Value Ref Range Status   03/11/2025 100.0 (H) 79.0 - 97.0 fL Final   03/09/2025 101.5 (H) 79.0 - 97.0 fL Final   03/09/2025 101.1 (H) 79.0 - 97.0 fL Final          Sodium Sodium   Date Value Ref Range Status   03/11/2025 135 (L) 136 - 145 mmol/L Final   03/09/2025 134 (L) 136 - 145 mmol/L Final   03/09/2025 135 (L) 136 - 145 mmol/L Final      Potassium Potassium   Date " "Value Ref Range Status   03/11/2025 4.0 3.5 - 5.2 mmol/L Final   03/09/2025 4.5 3.5 - 5.2 mmol/L Final   03/09/2025 4.4 3.5 - 5.2 mmol/L Final      Chloride Chloride   Date Value Ref Range Status   03/11/2025 96 (L) 98 - 107 mmol/L Final   03/09/2025 96 (L) 98 - 107 mmol/L Final   03/09/2025 97 (L) 98 - 107 mmol/L Final      CO2 CO2   Date Value Ref Range Status   03/11/2025 32.1 (H) 22.0 - 29.0 mmol/L Final   03/09/2025 28.8 22.0 - 29.0 mmol/L Final   03/09/2025 29.0 22.0 - 29.0 mmol/L Final      BUN BUN   Date Value Ref Range Status   03/11/2025 48 (H) 8 - 23 mg/dL Final   03/09/2025 47 (H) 8 - 23 mg/dL Final   03/09/2025 48 (H) 8 - 23 mg/dL Final      Creatinine Creatinine   Date Value Ref Range Status   03/11/2025 1.14 (H) 0.57 - 1.00 mg/dL Final   03/09/2025 1.14 (H) 0.57 - 1.00 mg/dL Final   03/09/2025 1.14 (H) 0.57 - 1.00 mg/dL Final      Calcium Calcium   Date Value Ref Range Status   03/11/2025 9.5 8.6 - 10.5 mg/dL Final   03/09/2025 9.3 8.6 - 10.5 mg/dL Final   03/09/2025 9.5 8.6 - 10.5 mg/dL Final      PO4 No results found for: \"CAPO4\"   Albumin Albumin   Date Value Ref Range Status   03/11/2025 3.1 (L) 3.5 - 5.2 g/dL Final   03/09/2025 3.0 (L) 3.5 - 5.2 g/dL Final   03/09/2025 3.0 (L) 3.5 - 5.2 g/dL Final      Magnesium Magnesium   Date Value Ref Range Status   03/11/2025 2.0 1.6 - 2.4 mg/dL Final   03/09/2025 2.0 1.6 - 2.4 mg/dL Final   03/09/2025 1.9 1.6 - 2.4 mg/dL Final      Uric Acid No results found for: \"URICACID\"        Results Review:     I reviewed the patient's new clinical results.    apixaban, 5 mg, Oral, Q12H  DULoxetine, 40 mg, Oral, Daily  furosemide, 80 mg, Intravenous, Q12H  HYDROcodone-acetaminophen, 1 tablet, Oral, Q6H  levothyroxine, 137 mcg, Oral, Q AM  metOLazone, 5 mg, Oral, Once  metoprolol succinate XL, 12.5 mg, Oral, Q12H  midodrine, 15 mg, Oral, Q8H  mirtazapine, 15 mg, Oral, Nightly  polyethylene glycol, 17 g, Oral, Daily  sennosides-docusate, 1 tablet, Oral, BID  sodium " chloride, 10 mL, Intravenous, Q12H  sodium chloride, 10 mL, Intravenous, Q12H  tacrolimus, 0.5 mg, Oral, BID  vitamin B-12, 1,000 mcg, Oral, Daily      Medication Review: Reviewed    Assessment & Plan     TONYA  CKD3  Kidney Transplant  UTI  Fluid overload  Hypotension  Viral URI  RUE swelling - AVF with thrill and bruit  Anemia     PLAN: Cr improved and stable at 1.14 (recent baseline Cr 1.2).  Continue Prograf - level OK.  Continue Midodrine for BP support.  Continue IV Furosemide and give Metolazone again today.  OK back to rehab once O2 requirement back to baseline.  Will follow.      Naun Falcon MD  [facility]  03/11/25  09:04 EDT        Electronically signed by Naun Falcon MD at 03/11/25 0906       Janene Archer APRASHLEIGH at 03/10/25 1044       Attestation signed by Juanis Lowe MD at 03/10/25 1150    I have reviewed this documentation and agree.                       LOS: 7 days   Patient Care Team:  Kvng Guillen MD as PCP - General (Family Medicine)  Jak Gavin MD as Cardiologist (Cardiology)    Subjective     Patient denies any new complaints    Review of Systems   Constitutional:  Positive for activity change, appetite change and fatigue.   HENT: Negative.     Respiratory: Negative.     Cardiovascular:  Positive for leg swelling.   Gastrointestinal: Negative.    Genitourinary: Negative.    Musculoskeletal:  Positive for gait problem.   Neurological:  Positive for weakness.   Psychiatric/Behavioral: Negative.             Objective     Vital Signs  Temp:  [97.5 °F (36.4 °C)-98.2 °F (36.8 °C)] 97.9 °F (36.6 °C)  Heart Rate:  [] 119  Resp:  [14-23] 16  BP: ()/(57-87) 109/61      Physical Exam  Vitals reviewed.   Constitutional:       Appearance: She is not ill-appearing.   HENT:      Head: Normocephalic and atraumatic.      Right Ear: External ear normal.      Left Ear: External ear normal.      Nose: Nose normal.      Mouth/Throat:      Mouth: Mucous  membranes are dry.   Eyes:      General:         Right eye: No discharge.         Left eye: No discharge.   Cardiovascular:      Rate and Rhythm: Normal rate and regular rhythm.      Pulses: Normal pulses.      Heart sounds: Normal heart sounds.   Pulmonary:      Effort: Pulmonary effort is normal.      Breath sounds: Normal breath sounds.   Abdominal:      General: Bowel sounds are normal.      Palpations: Abdomen is soft.   Musculoskeletal:         General: Normal range of motion.      Cervical back: Normal range of motion.   Skin:     General: Skin is warm and dry.   Neurological:      Mental Status: She is alert and oriented to person, place, and time.   Psychiatric:         Behavior: Behavior normal.              Results Review:    Lab Results (last 24 hours)       Procedure Component Value Units Date/Time    POC Glucose Once [[id number]]  (Normal) Collected: 03/10/25 0828    Specimen: Blood Updated: 03/10/25 0829     Glucose 89 mg/dL      Comment: Serial Number: [id number]:  [id number]       Magnesium [[id number]]  (Normal) Collected: 03/09/25 2334    Specimen: Blood Updated: 03/10/25 0124     Magnesium 2.0 mg/dL     Comprehensive Metabolic Panel [[id number]]  (Abnormal) Collected: 03/09/25 2334    Specimen: Blood Updated: 03/10/25 0124     Glucose 103 mg/dL      BUN 47 mg/dL      Creatinine 1.14 mg/dL      Sodium 134 mmol/L      Potassium 4.5 mmol/L      Chloride 96 mmol/L      CO2 28.8 mmol/L      Calcium 9.3 mg/dL      Total Protein 5.7 g/dL      Albumin 3.0 g/dL      ALT (SGPT) 5 U/L      AST (SGOT) 13 U/L      Alkaline Phosphatase 76 U/L      Total Bilirubin 0.4 mg/dL      Globulin 2.7 gm/dL      A/G Ratio 1.1 g/dL      BUN/Creatinine Ratio 41.2     Anion Gap 9.2 mmol/L      eGFR 49.4 mL/min/1.73     Narrative:      GFR Categories in Chronic Kidney Disease (CKD)      GFR Category          GFR (mL/min/1.73)    Interpretation  G1                     90 or greater         Normal or high (1)  G2                       60-89                Mild decrease (1)  G3a                   45-59                Mild to moderate decrease  G3b                   30-44                Moderate to severe decrease  G4                    15-29                Severe decrease  G5                    14 or less           Kidney failure          (1)In the absence of evidence of kidney disease, neither GFR category G1 or G2 fulfill the criteria for CKD.    eGFR calculation 2021 CKD-EPI creatinine equation, which does not include race as a factor    Phosphorus [[id number]]  (Normal) Collected: 03/09/25 2334    Specimen: Blood Updated: 03/10/25 0122     Phosphorus 3.8 mg/dL     CBC & Differential [[id number]]  (Abnormal) Collected: 03/09/25 2334    Specimen: Blood Updated: 03/10/25 0103    Narrative:      The following orders were created for panel order CBC & Differential.  Procedure                               Abnormality         Status                     ---------                               -----------         ------                     CBC Auto Differential[[id number]]        Abnormal            Final result                 Please view results for these tests on the individual orders.    CBC Auto Differential [[id number]]  (Abnormal) Collected: 03/09/25 2334    Specimen: Blood Updated: 03/10/25 0103     WBC 7.04 10*3/mm3      RBC 2.59 10*6/mm3      Hemoglobin 7.7 g/dL      Hematocrit 26.3 %      .5 fL      MCH 29.7 pg      MCHC 29.3 g/dL      RDW 17.8 %      RDW-SD 65.7 fl      MPV 9.6 fL      Platelets 149 10*3/mm3      Neutrophil % 61.3 %      Lymphocyte % 20.0 %      Monocyte % 11.2 %      Eosinophil % 6.8 %      Basophil % 0.4 %      Immature Grans % 0.3 %      Neutrophils, Absolute 4.31 10*3/mm3      Lymphocytes, Absolute 1.41 10*3/mm3      Monocytes, Absolute 0.79 10*3/mm3      Eosinophils, Absolute 0.48 10*3/mm3      Basophils, Absolute 0.03 10*3/mm3      Immature Grans, Absolute 0.02 10*3/mm3      nRBC 0.0 /100 WBC      Comprehensive Metabolic Panel [[id number]]  (Abnormal) Collected: 03/09/25 1003    Specimen: Blood from Arm, Left Updated: 03/09/25 1130     Glucose 131 mg/dL      BUN 48 mg/dL      Creatinine 1.14 mg/dL      Sodium 135 mmol/L      Potassium 4.4 mmol/L      Chloride 97 mmol/L      CO2 29.0 mmol/L      Calcium 9.5 mg/dL      Total Protein 5.9 g/dL      Albumin 3.0 g/dL      ALT (SGPT) <5 U/L      AST (SGOT) 13 U/L      Alkaline Phosphatase 74 U/L      Total Bilirubin 0.5 mg/dL      Globulin 2.9 gm/dL      A/G Ratio 1.0 g/dL      BUN/Creatinine Ratio 42.1     Anion Gap 9.0 mmol/L      eGFR 49.4 mL/min/1.73     Narrative:      GFR Categories in Chronic Kidney Disease (CKD)      GFR Category          GFR (mL/min/1.73)    Interpretation  G1                     90 or greater         Normal or high (1)  G2                      60-89                Mild decrease (1)  G3a                   45-59                Mild to moderate decrease  G3b                   30-44                Moderate to severe decrease  G4                    15-29                Severe decrease  G5                    14 or less           Kidney failure          (1)In the absence of evidence of kidney disease, neither GFR category G1 or G2 fulfill the criteria for CKD.    eGFR calculation 2021 CKD-EPI creatinine equation, which does not include race as a factor    Magnesium [[id number]]  (Normal) Collected: 03/09/25 1003    Specimen: Blood from Arm, Left Updated: 03/09/25 1127     Magnesium 1.9 mg/dL     Phosphorus [[id number]]  (Normal) Collected: 03/09/25 1003    Specimen: Blood from Arm, Left Updated: 03/09/25 1126     Phosphorus 3.8 mg/dL     CBC & Differential [[id number]]  (Abnormal) Collected: 03/09/25 1003    Specimen: Blood from Arm, Left Updated: 03/09/25 1102    Narrative:      The following orders were created for panel order CBC & Differential.  Procedure                               Abnormality         Status                     ---------                                -----------         ------                     CBC Auto Differential[[id number]]        Abnormal            Final result                 Please view results for these tests on the individual orders.    CBC Auto Differential [[id number]]  (Abnormal) Collected: 03/09/25 1003    Specimen: Blood from Arm, Left Updated: 03/09/25 1102     WBC 7.45 10*3/mm3      RBC 2.73 10*6/mm3      Hemoglobin 8.0 g/dL      Hematocrit 27.6 %      .1 fL      MCH 29.3 pg      MCHC 29.0 g/dL      RDW 17.9 %      RDW-SD 65.5 fl      MPV 9.2 fL      Platelets 148 10*3/mm3      Neutrophil % 65.0 %      Lymphocyte % 15.8 %      Monocyte % 10.1 %      Eosinophil % 8.1 %      Basophil % 0.5 %      Immature Grans % 0.5 %      Neutrophils, Absolute 4.84 10*3/mm3      Lymphocytes, Absolute 1.18 10*3/mm3      Monocytes, Absolute 0.75 10*3/mm3      Eosinophils, Absolute 0.60 10*3/mm3      Basophils, Absolute 0.04 10*3/mm3      Immature Grans, Absolute 0.04 10*3/mm3      nRBC 0.0 /100 WBC              Imaging Results (Last 24 Hours)       ** No results found for the last 24 hours. **                 I reviewed the patient's new clinical results.    Medication Review:   Scheduled Meds:apixaban, 5 mg, Oral, Q12H  DULoxetine, 40 mg, Oral, Daily  furosemide, 80 mg, Intravenous, Q12H  HYDROcodone-acetaminophen, 1 tablet, Oral, Q6H  levothyroxine, 137 mcg, Oral, Q AM  metoprolol succinate XL, 12.5 mg, Oral, Q24H  midodrine, 15 mg, Oral, Q8H  mirtazapine, 15 mg, Oral, Nightly  polyethylene glycol, 17 g, Oral, Daily  sennosides-docusate, 1 tablet, Oral, BID  sodium chloride, 10 mL, Intravenous, Q12H  sodium chloride, 10 mL, Intravenous, Q12H  tacrolimus, 0.5 mg, Oral, BID  vitamin B-12, 1,000 mcg, Oral, Daily      Continuous Infusions:   PRN Meds:.  acetaminophen **OR** acetaminophen    albuterol    aluminum-magnesium hydroxide-simethicone    senna-docusate sodium **AND** polyethylene glycol **AND** bisacodyl **AND** bisacodyl     hydrOXYzine    ipratropium-albuterol    [Held by provider] LORazepam    nitroglycerin    ondansetron ODT **OR** ondansetron    prochlorperazine    [COMPLETED] Insert Peripheral IV **AND** sodium chloride    sodium chloride    sodium chloride    sodium chloride    sodium chloride    sodium chloride     Interval History:    Assessment & Plan     UTI (urinary tract infection)  Acute exacerbation of CHF (congestive heart failure)  End stage renal disease  Edema of right upper arm  TONYA (acute kidney injury)  Pressure injury of right heel, stage 3  Edema of both lower extremities due to peripheral venous insufficiency  COPD (chronic obstructive pulmonary disease)  Hypothyroidism, unspecified  History of kidney transplant  Long term (current) use of immunosuppressive biologic  Long term current use of anticoagulant therapy  History of DVT (deep vein thrombosis)  Pulmonary hypertension  Mixed anxiety and depressive disorder  Essential (primary) hypertension  Paroxysmal atrial fibrillation    Plan of care  Urine culture negative afebrile no leukocytosis will dc rocephin. Nephrology following for diuresis and management. Creatinine is improving/ continue Prograf and midodrine for support.  She does have working fistula if needed for HD. Appreciate wound care recommendations.    Patient had an episode this am with diuresis, elevated HR, and decrease in oxygenation. Resolved on its own. Patient states she thought she was going to die. Nephrology transitioned back to IV diuretic. Continue current plan of care and medication    Patient is DNR/DNI    Plan for disposition::back to [facility]     Janene PARKER TONYA Archer  03/10/25  10:44 EDT          Electronically signed by Juanis Lowe MD at 03/10/25 1150       Naun Falcon MD at 03/10/25 0812          RENAL/[facility]:     LOS: 7 days    Patient Care Team:  Kvng Guillen MD as PCP - General (Family Medicine)  Jak Gavin MD as Cardiologist (Cardiology)    Chief  "Complaint:  TONYA/CKD, Kidney Transplant    Subjective     Interval History:   Chart reviewed  Increased O2 requirement this AM  Anxious appearing    Objective     Vital Sign Min/Max for last 24 hours  Temp  Min: 97.5 °F (36.4 °C)  Max: 98.2 °F (36.8 °C)   BP  Min: 97/69  Max: 119/59   Pulse  Min: 92  Max: 109   Resp  Min: 14  Max: 23   SpO2  Min: 91 %  Max: 100 %   Flow (L/min) (Oxygen Therapy)  Min: 6  Max: 7   Weight  Min: 77.6 kg (171 lb)  Max: 77.6 kg (171 lb)     Flowsheet Rows      Flowsheet Row First Filed Value   Admission Height 167.7 cm (66.02\") Documented at 03/03/2025 1641   Admission Weight 81.6 kg (179 lb 14.3 oz) Documented at 03/03/2025 1641            No intake/output data recorded.  I/O last 3 completed shifts:  In: 120 [P.O.:120]  Out: 400 [Urine:400]    Physical Exam:  GEN: Awake, Anxious  ENT: PERRL, EOMI, MMM  NECK: Supple, no JVD  CHEST: CTAB, no W/R/C  CV: RRR, no M/G/R, +edema  ABD: Soft, NT, +BS  SKIN: Warm and Dry  NEURO: CN's intact      WBC WBC   Date Value Ref Range Status   03/09/2025 7.04 3.40 - 10.80 10*3/mm3 Final   03/09/2025 7.45 3.40 - 10.80 10*3/mm3 Final   03/08/2025 7.03 3.40 - 10.80 10*3/mm3 Final      HGB Hemoglobin   Date Value Ref Range Status   03/09/2025 7.7 (L) 12.0 - 15.9 g/dL Final   03/09/2025 8.0 (L) 12.0 - 15.9 g/dL Final   03/08/2025 8.3 (L) 12.0 - 15.9 g/dL Final      HCT Hematocrit   Date Value Ref Range Status   03/09/2025 26.3 (L) 34.0 - 46.6 % Final   03/09/2025 27.6 (L) 34.0 - 46.6 % Final   03/08/2025 28.4 (L) 34.0 - 46.6 % Final      Platlets No results found for: \"LABPLAT\"   MCV MCV   Date Value Ref Range Status   03/09/2025 101.5 (H) 79.0 - 97.0 fL Final   03/09/2025 101.1 (H) 79.0 - 97.0 fL Final   03/08/2025 101.4 (H) 79.0 - 97.0 fL Final          Sodium Sodium   Date Value Ref Range Status   03/09/2025 134 (L) 136 - 145 mmol/L Final   03/09/2025 135 (L) 136 - 145 mmol/L Final   03/08/2025 136 136 - 145 mmol/L Final      Potassium Potassium   Date Value " "Ref Range Status   03/09/2025 4.5 3.5 - 5.2 mmol/L Final   03/09/2025 4.4 3.5 - 5.2 mmol/L Final   03/08/2025 4.7 3.5 - 5.2 mmol/L Final     Comment:     Slight hemolysis detected by analyzer. Result may be falsely elevated.      Chloride Chloride   Date Value Ref Range Status   03/09/2025 96 (L) 98 - 107 mmol/L Final   03/09/2025 97 (L) 98 - 107 mmol/L Final   03/08/2025 101 98 - 107 mmol/L Final      CO2 CO2   Date Value Ref Range Status   03/09/2025 28.8 22.0 - 29.0 mmol/L Final   03/09/2025 29.0 22.0 - 29.0 mmol/L Final   03/08/2025 26.1 22.0 - 29.0 mmol/L Final      BUN BUN   Date Value Ref Range Status   03/09/2025 47 (H) 8 - 23 mg/dL Final   03/09/2025 48 (H) 8 - 23 mg/dL Final   03/08/2025 48 (H) 8 - 23 mg/dL Final      Creatinine Creatinine   Date Value Ref Range Status   03/09/2025 1.14 (H) 0.57 - 1.00 mg/dL Final   03/09/2025 1.14 (H) 0.57 - 1.00 mg/dL Final   03/08/2025 1.19 (H) 0.57 - 1.00 mg/dL Final      Calcium Calcium   Date Value Ref Range Status   03/09/2025 9.3 8.6 - 10.5 mg/dL Final   03/09/2025 9.5 8.6 - 10.5 mg/dL Final   03/08/2025 8.9 8.6 - 10.5 mg/dL Final      PO4 No results found for: \"CAPO4\"   Albumin Albumin   Date Value Ref Range Status   03/09/2025 3.0 (L) 3.5 - 5.2 g/dL Final   03/09/2025 3.0 (L) 3.5 - 5.2 g/dL Final   03/08/2025 2.9 (L) 3.5 - 5.2 g/dL Final      Magnesium Magnesium   Date Value Ref Range Status   03/09/2025 2.0 1.6 - 2.4 mg/dL Final   03/09/2025 1.9 1.6 - 2.4 mg/dL Final   03/08/2025 1.8 1.6 - 2.4 mg/dL Final      Uric Acid No results found for: \"URICACID\"        Results Review:     I reviewed the patient's new clinical results.    apixaban, 5 mg, Oral, Q12H  DULoxetine, 40 mg, Oral, Daily  furosemide, 80 mg, Intravenous, Q12H  HYDROcodone-acetaminophen, 1 tablet, Oral, Q6H  levothyroxine, 137 mcg, Oral, Q AM  metOLazone, 5 mg, Oral, Once  metoprolol succinate XL, 12.5 mg, Oral, Q24H  midodrine, 15 mg, Oral, Q8H  mirtazapine, 15 mg, Oral, Nightly  polyethylene " glycol, 17 g, Oral, Daily  sennosides-docusate, 1 tablet, Oral, BID  sodium chloride, 10 mL, Intravenous, Q12H  sodium chloride, 10 mL, Intravenous, Q12H  tacrolimus, 0.5 mg, Oral, BID  vitamin B-12, 1,000 mcg, Oral, Daily      Medication Review: Reviewed    Assessment & Plan     TONYA  CKD3  Kidney Transplant  UTI  Fluid overload  Hypotension  Viral URI  RUE swelling - AVF with thrill and bruit  Anemia     PLAN: Cr improved and stable at 1.14 (recent baseline Cr 1.2).  Continue Prograf - level OK.  Continue Midodrine for BP support.  Transition back to IV Furosemide and give Metolazone today.  Will follow closely.        Naun Falcon MD  [facility]  03/10/25  08:12 EDT        Electronically signed by Naun Falcon MD at 03/10/25 0814       Jak Gavin MD at 03/10/25 0640          Referring Provider: Juanis Lowe MD    Reason for follow-up: acute CHF, hypotension, a-fib      Patient Care Team:  Kvng Guillen MD as PCP - General (Family Medicine)  Jak Gavin MD as Cardiologist (Cardiology)      SUBJECTIVE  Episode of tachycardia, hypoxemia this morning.  Heart rate up to 120 and oxygen requirement up to 10 L.       ROS  Review of all systems negative except as indicated.    Since I have last seen, the patient has been without any chest discomfort, shortness of breath, palpitations, dizziness or syncope.  Denies having any headache, abdominal pain, nausea, vomiting, diarrhea, constipation, loss of weight or loss of appetite.  Denies having any excessive bruising, hematuria or blood in the stool.        Personal History:    Past Medical History:   Diagnosis Date    Allergic rhinitis 04/14/2015    Asthma 08/10/2017    Atrial flutter 05/11/2017    Chronic diarrhea 04/15/2019    Chronic hypoxemic respiratory failure 01/18/2024    Chronic pain 02/03/2015    COPD (chronic obstructive pulmonary disease)     COVID-19 virus detected 08/11/2022    Cytokine release syndrome, grade 1  08/16/2022    Edema of both lower extremities due to peripheral venous insufficiency 03/12/2021    ESRD on hemodialysis 05/22/2013    Essential (primary) hypertension 03/03/2022    Fracture of ulnar styloid 05/19/2022    Fracture of unspecified carpal bone, right wrist, subsequent encounter for fracture with routine healing 03/03/2022    Gastroesophageal reflux disease 11/12/2020    Gout 08/10/2017    Hearing loss 08/10/2017    History of appendectomy     History of DVT (deep vein thrombosis) 11/12/2020    History of kidney transplant 06/30/2017    History of lobectomy of lung 01/18/2024 03/08/2016:  RIGHT Lower Lobe Mass--> Right Video-assisted thoracoscopy with a moderate-to-large wedge resection of the RLL (by Dr. Haris Mcconnell @ [facility])--> Poorly differentiated carcinoma of the RLL.      History of repair of hip joint 05/21/2013    History of suicide attempt 05/20/2024    Hyperlipidemia 08/11/2014    Hypothyroidism, unspecified 03/03/2022    Infection due to extended spectrum beta lactamase (ESBL) producing bacteria 03/11/2016    No A2K system hx. +ESBL E coli urine on 3/11/16.      Irritable bowel syndrome 04/15/2019    Long term (current) use of immunosuppressive biologic 04/28/2021    Long term current use of anticoagulant therapy 01/18/2024    Malignant neoplasm of lung 08/10/2017    Menopausal flushing 08/30/2021    Mitral valve regurgitation 07/06/2015    Mixed anxiety and depressive disorder 04/30/2015    Non-small cell lung cancer 08/10/2017    Nonobstructive atherosclerosis of coronary artery 06/02/2019 08/12/2022: CATH: Prashant: NSTEMI assoc with Covid-19: LM:-nl;  LAD: diffuse, Ca++; 30%; CIRC: Dominant. Normal; RCA: small; 50% diffuse, proximal and mid-segment.      NSTEMI (non-ST elevated myocardial infarction) 08/11/2022    Obsessive-compulsive disorder 04/15/2019    Osteoarthritis 05/20/2024    Paroxysmal atrial fibrillation 05/11/2017    Paroxysmal supraventricular  tachycardia 05/11/2017    Peripheral neuropathy 08/11/2014    Personal history of peptic ulcer disease 11/12/2020    Postoperative anemia due to acute blood loss 05/22/2013    Right wrist fracture 03/01/2022    Seasonal allergies 08/10/2017    Secondary hyperparathyroidism of renal origin 09/14/2015    Tricuspid valve regurgitation 03/15/2017    Ulcer of lower extremity 08/30/2021    Unspecified acute appendicitis 03/03/2022    Valvular heart disease 05/20/2024    Wegener's granulomatosis with renal involvement 03/03/2022       Past Surgical History:   Procedure Laterality Date    APPENDECTOMY      ARTERIOVENOUS FISTULA/SHUNT SURGERY Right 12/3/2024    Procedure: FISTULOGRAM  and angioplasty RIGHT ARM;  Surgeon: Paulino Alva II, MD;  Location: [facility] HYBRID OR;  Service: Vascular;  Laterality: Right;    BREAST SURGERY Left     cysts rmeoved    CARDIAC CATHETERIZATION Right 08/12/2022    Procedure: Left Heart Cath and coronary angiogram;  Surgeon: Jak Gavin MD;  Location: [facility] CATH INVASIVE LOCATION;  Service: Cardiology;  Laterality: Right;    CLOSED REDUCTION WRIST FRACTURE Right 03/01/2022    Procedure: WRIST CLOSED REDUCTION;  Surgeon: Gaudencio Townsend MD;  Location: [facility] MAIN OR;  Service: Orthopedics;  Laterality: Right;    CYSTOSCOPY      HIP ARTHROPLASTY      HYSTERECTOMY      LUNG LOBECTOMY Right     TRANSPLANTATION RENAL         Family History   Problem Relation Age of Onset    No Known Problems Mother     No Known Problems Father        Social History     Tobacco Use    Smoking status: Former     Passive exposure: Current    Smokeless tobacco: Never   Vaping Use    Vaping status: Former   Substance Use Topics    Alcohol use: Never    Drug use: Never        Home meds:  Prior to Admission medications    Medication Sig Start Date End Date Taking? Authorizing Provider   amoxicillin-clavulanate (AUGMENTIN) 875-125 MG per tablet Take 1 tablet by mouth 2 (Two) Times a Day. 3/1/25  Yes Lorenzo Morales MD    apixaban (ELIQUIS) 5 MG tablet tablet Take 1 tablet by mouth Every 12 (Twelve) Hours. 8/16/22  Yes Clyde White DO   DULoxetine HCl 40 MG capsule delayed-release particles Take 1 capsule by mouth Daily. Indications: Major Depressive Disorder 7/11/24  Yes Provider, MD Court   ferrous sulfate 325 (65 FE) MG tablet Take 1 tablet by mouth 3 times a day.   Yes Provider, MD Court   furosemide (LASIX) 40 MG tablet Take 1 tablet by mouth 2 (Two) Times a Day.   Yes Provider, MD Court   hydrOXYzine (ATARAX) 25 MG tablet Take 1 tablet by mouth Every Night.   Yes Provider, MD Court   LORazepam (ATIVAN) 0.5 MG tablet Take 1 tablet by mouth Every 6 (Six) Hours.   Yes Provider, MD Court   metoprolol tartrate (LOPRESSOR) 25 MG tablet Take 0.5 tablets by mouth 2 (Two) Times a Day.   Yes Provider, MD Court   midodrine (PROAMATINE) 5 MG tablet Take 3 tablets by mouth 3 (Three) Times a Day Before Meals.   Yes Provider, MD Court   mirtazapine (REMERON) 15 MG tablet Take 1 tablet by mouth Every Night.   Yes Provider, MD Court   polyethylene glycol (MiraLax) 17 GM/SCOOP powder Take 17 g by mouth Daily.   Yes Provider, MD Court   predniSONE (DELTASONE) 5 MG tablet Take 1 tablet by mouth Daily. Indications: ACUTE EXACERBATION OF COPD (INACTIVE) 7/11/24  Yes ProviderCourt MD   sacubitril-valsartan (Entresto) 24-26 MG tablet Take 1 tablet by mouth 2 (Two) Times a Day.   Yes Provider, MD Court   sennosides-docusate (PERICOLACE) 8.6-50 MG per tablet Take 1 tablet by mouth 2 (Two) Times a Day.   Yes Provider, MD Court   simvastatin (ZOCOR) 20 MG tablet Take 1 tablet by mouth Every Night.   Yes Provider, MD Court   tacrolimus (PROGRAF) 0.5 MG capsule Take 1 capsule by mouth 2 (Two) Times a Day. 6/13/24  Yes Janene Archer APRN   vitamin B-12 (CYANOCOBALAMIN) 1000 MCG tablet Take 1 tablet by mouth Daily.   Yes Provider, MD Court   vitamin D  (ERGOCALCIFEROL) 1.25 MG (41677 UT) capsule capsule Take 1 capsule by mouth 1 (One) Time Per Week.   Yes Court Vences MD   acetaminophen (Tylenol) 325 MG tablet Take 2 tablets by mouth Every 4 (Four) Hours As Needed for Fever or Mild Pain. Indications: Pain 3/13/20   Court Vences MD   albuterol sulfate  (90 Base) MCG/ACT inhaler Inhale 2 puffs Every 6 (Six) Hours As Needed for Wheezing.    Court Vences MD   HYDROcodone-acetaminophen (NORCO)  MG per tablet Take 1 tablet by mouth Every 6 (Six) Hours.    Court Vences MD       Allergies:  Zolpidem    Scheduled Meds:apixaban, 5 mg, Oral, Q12H  DULoxetine, 40 mg, Oral, Daily  furosemide, 80 mg, Oral, BID Diuretics  HYDROcodone-acetaminophen, 1 tablet, Oral, Q6H  levothyroxine, 137 mcg, Oral, Q AM  metoprolol succinate XL, 12.5 mg, Oral, Q24H  midodrine, 15 mg, Oral, Q8H  mirtazapine, 15 mg, Oral, Nightly  polyethylene glycol, 17 g, Oral, Daily  sennosides-docusate, 1 tablet, Oral, BID  sodium chloride, 10 mL, Intravenous, Q12H  sodium chloride, 10 mL, Intravenous, Q12H  tacrolimus, 0.5 mg, Oral, BID  vitamin B-12, 1,000 mcg, Oral, Daily      Continuous Infusions:   PRN Meds:.  acetaminophen **OR** acetaminophen    albuterol    aluminum-magnesium hydroxide-simethicone    senna-docusate sodium **AND** polyethylene glycol **AND** bisacodyl **AND** bisacodyl    hydrOXYzine    ipratropium-albuterol    [Held by provider] LORazepam    nitroglycerin    ondansetron ODT **OR** ondansetron    prochlorperazine    [COMPLETED] Insert Peripheral IV **AND** sodium chloride    sodium chloride    sodium chloride    sodium chloride    sodium chloride    sodium chloride      OBJECTIVE    Vital Signs  Vitals:    03/09/25 2045 03/09/25 2318 03/10/25 0331 03/10/25 0439   BP: 102/57 102/59 104/61    BP Location: Left arm Left arm Left arm    Patient Position: Lying Lying Lying    Pulse: 94 102 98    Resp: 23 14 22    Temp: 98 °F (36.7 °C) 98 °F  "(36.7 °C) 98.2 °F (36.8 °C)    TempSrc: Oral Oral Axillary    SpO2: 100% 91% 98%    Weight:    77.6 kg (171 lb)   Height:           Flowsheet Rows      Flowsheet Row First Filed Value   Admission Height 167.7 cm (66.02\") Documented at 03/03/2025 1641   Admission Weight 81.6 kg (179 lb 14.3 oz) Documented at 03/03/2025 1641              Intake/Output Summary (Last 24 hours) at 3/10/2025 0640  Last data filed at 3/10/2025 0331  Gross per 24 hour   Intake 120 ml   Output 400 ml   Net -280 ml          Telemetry: Atrial fibrillation with rapid ventricular rate    Physical Exam:  The patient is alert and in no distress. She is frail and appears chronically ill.   Vital signs as noted above.  Head and neck revealed no carotid bruits or jugular venous distention.   Lungs with scattered rhonchi and diminished bases.  No wheezing.  On oxygen at 4 liters per high flow nasal cannula.   Heart normal first and second heart sounds.  No murmur. No precordial rub is present.  No gallop is present.  Abdomen soft and nontender.    Extremities with good peripheral pulses with 1+ edema in BLE.  Skin warm and dry.  Musculoskeletal system is grossly normal.  CNS:  she is oriented to person only      Results Review:  I have personally reviewed the results from the time of this admission to 3/10/2025 06:40 EDT and agree with these findings:  [x]  Laboratory  [x]  Microbiology  [x]  Radiology  [x]  EKG/Telemetry   [x]  Cardiology/Vascular   []  Pathology  [x]  Old records  []  Other:    Most notable findings include:    Lab Results (last 24 hours)       Procedure Component Value Units Date/Time    Magnesium [[id number]]  (Normal) Collected: 03/09/25 2334    Specimen: Blood Updated: 03/10/25 0124     Magnesium 2.0 mg/dL     Comprehensive Metabolic Panel [[id number]]  (Abnormal) Collected: 03/09/25 2334    Specimen: Blood Updated: 03/10/25 0124     Glucose 103 mg/dL      BUN 47 mg/dL      Creatinine 1.14 mg/dL      Sodium 134 mmol/L      " Potassium 4.5 mmol/L      Chloride 96 mmol/L      CO2 28.8 mmol/L      Calcium 9.3 mg/dL      Total Protein 5.7 g/dL      Albumin 3.0 g/dL      ALT (SGPT) 5 U/L      AST (SGOT) 13 U/L      Alkaline Phosphatase 76 U/L      Total Bilirubin 0.4 mg/dL      Globulin 2.7 gm/dL      A/G Ratio 1.1 g/dL      BUN/Creatinine Ratio 41.2     Anion Gap 9.2 mmol/L      eGFR 49.4 mL/min/1.73     Narrative:      GFR Categories in Chronic Kidney Disease (CKD)      GFR Category          GFR (mL/min/1.73)    Interpretation  G1                     90 or greater         Normal or high (1)  G2                      60-89                Mild decrease (1)  G3a                   45-59                Mild to moderate decrease  G3b                   30-44                Moderate to severe decrease  G4                    15-29                Severe decrease  G5                    14 or less           Kidney failure          (1)In the absence of evidence of kidney disease, neither GFR category G1 or G2 fulfill the criteria for CKD.    eGFR calculation 2021 CKD-EPI creatinine equation, which does not include race as a factor    Phosphorus [[id number]]  (Normal) Collected: 03/09/25 2334    Specimen: Blood Updated: 03/10/25 0122     Phosphorus 3.8 mg/dL     CBC & Differential [[id number]]  (Abnormal) Collected: 03/09/25 2334    Specimen: Blood Updated: 03/10/25 0103    Narrative:      The following orders were created for panel order CBC & Differential.  Procedure                               Abnormality         Status                     ---------                               -----------         ------                     CBC Auto Differential[[id number]]        Abnormal            Final result                 Please view results for these tests on the individual orders.    CBC Auto Differential [[id number]]  (Abnormal) Collected: 03/09/25 2334    Specimen: Blood Updated: 03/10/25 0103     WBC 7.04 10*3/mm3      RBC 2.59 10*6/mm3      Hemoglobin 7.7  g/dL      Hematocrit 26.3 %      .5 fL      MCH 29.7 pg      MCHC 29.3 g/dL      RDW 17.8 %      RDW-SD 65.7 fl      MPV 9.6 fL      Platelets 149 10*3/mm3      Neutrophil % 61.3 %      Lymphocyte % 20.0 %      Monocyte % 11.2 %      Eosinophil % 6.8 %      Basophil % 0.4 %      Immature Grans % 0.3 %      Neutrophils, Absolute 4.31 10*3/mm3      Lymphocytes, Absolute 1.41 10*3/mm3      Monocytes, Absolute 0.79 10*3/mm3      Eosinophils, Absolute 0.48 10*3/mm3      Basophils, Absolute 0.03 10*3/mm3      Immature Grans, Absolute 0.02 10*3/mm3      nRBC 0.0 /100 WBC     Comprehensive Metabolic Panel [[id number]]  (Abnormal) Collected: 03/09/25 1003    Specimen: Blood from Arm, Left Updated: 03/09/25 1130     Glucose 131 mg/dL      BUN 48 mg/dL      Creatinine 1.14 mg/dL      Sodium 135 mmol/L      Potassium 4.4 mmol/L      Chloride 97 mmol/L      CO2 29.0 mmol/L      Calcium 9.5 mg/dL      Total Protein 5.9 g/dL      Albumin 3.0 g/dL      ALT (SGPT) <5 U/L      AST (SGOT) 13 U/L      Alkaline Phosphatase 74 U/L      Total Bilirubin 0.5 mg/dL      Globulin 2.9 gm/dL      A/G Ratio 1.0 g/dL      BUN/Creatinine Ratio 42.1     Anion Gap 9.0 mmol/L      eGFR 49.4 mL/min/1.73     Narrative:      GFR Categories in Chronic Kidney Disease (CKD)      GFR Category          GFR (mL/min/1.73)    Interpretation  G1                     90 or greater         Normal or high (1)  G2                      60-89                Mild decrease (1)  G3a                   45-59                Mild to moderate decrease  G3b                   30-44                Moderate to severe decrease  G4                    15-29                Severe decrease  G5                    14 or less           Kidney failure          (1)In the absence of evidence of kidney disease, neither GFR category G1 or G2 fulfill the criteria for CKD.    eGFR calculation 2021 CKD-EPI creatinine equation, which does not include race as a factor    Magnesium [[id number]]   (Normal) Collected: 03/09/25 1003    Specimen: Blood from Arm, Left Updated: 03/09/25 1127     Magnesium 1.9 mg/dL     Phosphorus [[id number]]  (Normal) Collected: 03/09/25 1003    Specimen: Blood from Arm, Left Updated: 03/09/25 1126     Phosphorus 3.8 mg/dL     CBC & Differential [[id number]]  (Abnormal) Collected: 03/09/25 1003    Specimen: Blood from Arm, Left Updated: 03/09/25 1102    Narrative:      The following orders were created for panel order CBC & Differential.  Procedure                               Abnormality         Status                     ---------                               -----------         ------                     CBC Auto Differential[[id number]]        Abnormal            Final result                 Please view results for these tests on the individual orders.    CBC Auto Differential [[id number]]  (Abnormal) Collected: 03/09/25 1003    Specimen: Blood from Arm, Left Updated: 03/09/25 1102     WBC 7.45 10*3/mm3      RBC 2.73 10*6/mm3      Hemoglobin 8.0 g/dL      Hematocrit 27.6 %      .1 fL      MCH 29.3 pg      MCHC 29.0 g/dL      RDW 17.9 %      RDW-SD 65.5 fl      MPV 9.2 fL      Platelets 148 10*3/mm3      Neutrophil % 65.0 %      Lymphocyte % 15.8 %      Monocyte % 10.1 %      Eosinophil % 8.1 %      Basophil % 0.5 %      Immature Grans % 0.5 %      Neutrophils, Absolute 4.84 10*3/mm3      Lymphocytes, Absolute 1.18 10*3/mm3      Monocytes, Absolute 0.75 10*3/mm3      Eosinophils, Absolute 0.60 10*3/mm3      Basophils, Absolute 0.04 10*3/mm3      Immature Grans, Absolute 0.04 10*3/mm3      nRBC 0.0 /100 WBC             Imaging Results (Last 24 Hours)       ** No results found for the last 24 hours. **            LAB RESULTS (LAST 7 DAYS)    CBC  Results from last 7 days   Lab Units 03/09/25  2334 03/09/25  1003 03/08/25  0544 03/07/25  0541 03/06/25  0907 03/05/25  0744 03/04/25  0231 03/04/25  0124   WBC 10*3/mm3 7.04 7.45 7.03 8.45 7.20 8.11  --  8.18   RBC 10*6/mm3  2.59* 2.73* 2.80* 2.81* 2.88* 2.73*  --  3.05*   HEMOGLOBIN g/dL 7.7* 8.0* 8.3* 8.4* 8.6* 8.1*  --  9.1*   HEMOGLOBIN, POC g/dL  --   --   --   --   --   --  10.0*  --    HEMATOCRIT % 26.3* 27.6* 28.4* 28.7* 29.6* 28.1*  --  31.5*   HEMATOCRIT POC %  --   --   --   --   --   --  29*  --    MCV fL 101.5* 101.1* 101.4* 102.1* 102.8* 102.9*  --  103.3*   PLATELETS 10*3/mm3 149 148 132* 161 167 168  --  204       BMP  Results from last 7 days   Lab Units 03/09/25  2334 03/09/25  1003 03/08/25  0544 03/07/25  0541 03/06/25  0907 03/05/25  0744 03/04/25  0549 03/04/25  0231 03/04/25  0124   SODIUM mmol/L 134* 135* 136 136 136 132* 140   < >  --    POTASSIUM mmol/L 4.5 4.4 4.7 4.7 4.9 5.1 4.9   < >  --    CHLORIDE mmol/L 96* 97* 101 97* 98 97* 103   < >  --    CO2 mmol/L 28.8 29.0 26.1 28.0 30.6* 26.8 28.3   < >  --    BUN mg/dL 47* 48* 48* 50* 51* 51* 48*   < >  --    CREATININE mg/dL 1.14* 1.14* 1.19* 1.41* 1.75* 1.91* 1.84*   < >  --    GLUCOSE mg/dL 103* 131* 76 83 96 99 120*   < >  --    MAGNESIUM mg/dL 2.0 1.9 1.8 2.0 2.0 2.0 2.0   < >  --    PHOSPHORUS mg/dL 3.8 3.8 3.2 3.3 3.3 3.3  --   --  4.6*    < > = values in this interval not displayed.       CMP   Results from last 7 days   Lab Units 03/09/25  2334 03/09/25  1003 03/08/25  0544 03/07/25  0541 03/06/25  0907 03/05/25  0744 03/04/25  0549 03/04/25  0231 03/04/25  0124   SODIUM mmol/L 134* 135* 136 136 136 132* 140   < >  --    POTASSIUM mmol/L 4.5 4.4 4.7 4.7 4.9 5.1 4.9   < >  --    CHLORIDE mmol/L 96* 97* 101 97* 98 97* 103   < >  --    CO2 mmol/L 28.8 29.0 26.1 28.0 30.6* 26.8 28.3   < >  --    BUN mg/dL 47* 48* 48* 50* 51* 51* 48*   < >  --    CREATININE mg/dL 1.14* 1.14* 1.19* 1.41* 1.75* 1.91* 1.84*   < >  --    GLUCOSE mg/dL 103* 131* 76 83 96 99 120*   < >  --    ALBUMIN g/dL 3.0* 3.0* 2.9* 3.1* 3.2* 3.1* 3.7  --   --    BILIRUBIN mg/dL 0.4 0.5 0.4 0.5 0.5 0.4 0.4  --   --    ALK PHOS U/L 76 74 70 72 75 69 67  --   --    AST (SGOT) U/L 13 13 22 13 15  13 18  --   --    ALT (SGPT) U/L 5 <5 <5 <5 <5 <5 5  --   --    AMMONIA umol/L  --   --   --   --   --   --   --   --  22    < > = values in this interval not displayed.       BNP        TROPONIN  Results from last 7 days   Lab Units 03/03/25  1839   HSTROP T ng/L 49*       CoAg        Creatinine Clearance  Estimated Creatinine Clearance: 42.8 mL/min (A) (by C-G formula based on SCr of 1.14 mg/dL (H)).    ABG  Results from last 7 days   Lab Units 03/04/25  0231   PH, ARTERIAL pH units 7.291*   PCO2, ARTERIAL mm Hg 69.0*   PO2 ART mm Hg 82.0*   O2 SATURATION ART % 94.0   BASE EXCESS ART mmol/L 5.2*       Radiology  No radiology results for the last day      EKG  I personally viewed and interpreted the patient's EKG/Telemetry data:  ECG 12 Lead Altered Mental Status   Final Result   HEART RATE=96  bpm   RR Mhwtsxqh=449  ms   VA Interval=  ms   P Horizontal Axis=  deg   P Front Axis=  deg   QRSD Interval=80  ms   QT Nvwfxrkh=383  ms   CLdG=458  ms   QRS Axis=-39  deg   T Wave Axis=90  deg   - ABNORMAL ECG -   Atrial fibrillation   Ventricular premature complex   Left axis deviation   Anteroseptal  infarct, age indeterminate   When compared with ECG of 01-Mar-2025 09:40:43,   Significant axis, voltage or hypertrophy change   Electronically Signed By: Paulino Miller (TWILA) 2025-03-04 07:28:16   Date and Time of Study:2025-03-03 16:45:51      Telemetry Scan   Final Result      Telemetry Scan   Final Result            Echocardiogram:    Results for orders placed during the hospital encounter of 11/29/24    Adult Transthoracic Echo Complete W/ Cont if Necessary Per Protocol    Interpretation Summary    Left ventricular systolic function is normal. Calculated left ventricular EF = 70% Left ventricular ejection fraction appears to be 66 - 70%.    Left ventricular wall thickness is consistent with moderate to severe concentric hypertrophy.    Left ventricular diastolic function is consistent with (grade III w/high LAP)  reversible restrictive pattern.    Moderately reduced right ventricular systolic function noted.    The right ventricular cavity is severely dilated.    The left atrial cavity is severely dilated.    The right atrial cavity is severely  dilated.    There is mild calcification of the aortic valve mainly affecting the left coronary and right coronary cusp(s).    Severe tricuspid valve regurgitation is present.    Estimated right ventricular systolic pressure from tricuspid regurgitation is markedly elevated (>55 mmHg).    Severe pulmonary hypertension is present.    There is a moderate sized left pleural effusion.        Stress Test:         Cardiac Catheterization:  Results for orders placed during the hospital encounter of 08/11/22    Cardiac Catheterization/Vascular Study    Conclusion  IMPRESSIONS  Non obstructive CAD         Other:         ASSESSMENT & PLAN:    Principal Problem:    UTI (urinary tract infection)  Active Problems:    COPD (chronic obstructive pulmonary disease)    Hypothyroidism, unspecified    History of kidney transplant    Long term (current) use of immunosuppressive biologic    Long term current use of anticoagulant therapy    History of DVT (deep vein thrombosis)    Pulmonary hypertension    Decubitus ulcer of foot, stage 3    Edema of both lower extremities due to peripheral venous insufficiency    Mixed anxiety and depressive disorder    Essential (primary) hypertension    Paroxysmal atrial fibrillation    Acute exacerbation of CHF (congestive heart failure)    End stage renal disease    Edema of right upper arm    TONYA (acute kidney injury)    Pressure injury of right heel, stage 3      Atrial fibrillation with rapid ventricular rate  Heart rate 100s-110s  Increase Toprol-XL twice daily today  [id number] score is 5  Continue Eliquis      Acute on chronic HFpEF / Pulmonary hypertension  Echocardiogram shows preserved LV function with severe tricuspid valve regurgitation and severe  pulmonary hypertension.  Presented with proBNP of 17,000 (compared to 4800 during previous admission)  Continue IV diuretics.  Dosing per nephrology  Metolazone as needed  Unable to add GDMT due to renal dysfunction and hypotension requiring midodrine  Strict I&Os and daily weight     Hypotension  Requiring midodrine  Entresto is on hold     History of kidney transplant / TONYA on CKD  AV fistula in place, but no on hemodialysis   On tacrolimus  Creatinine improving 1.14, GFR 49.4  Closely monitor renal function while on diuretics     Right IJ DVT  Diagnosed in 2024  Repeat venous duplex is negative for DVT  VQ scan with low probability of PE  Continue Eliquis     Anemia in CKD   H&H 7.7/26.3  Monitor H&H while on anticoagulation  Transfuse as needed     COPD  History of lung cancer  10 L of oxygen  Continue bronchodilators     Anxiety/depression  Currently on duloxetine and mirtazapine                 Electronically signed by Jak Gavin MD at 03/10/25 1124       Konrad Osorio MD at 25 1629          Name: Jaja Carcamo  Age: 80 y.o.  : 1945  Sex: female    27    Subjective  Patient complains of anxiety on and off.     Interval History   No acute events overnight.      Objective:    Vital Signs  Temp:  [97.2 °F (36.2 °C)-98 °F (36.7 °C)] 98 °F (36.7 °C)  Heart Rate:  [] 106  Resp:  [14-20] 20  BP: ()/(54-87) 107/87        Intake/Output Summary (Last 24 hours) at 3/9/2025 1629  Last data filed at 3/9/2025 0900  Gross per 24 hour   Intake 120 ml   Output --   Net 120 ml           Physical Exam  Physical Exam  Constitutional:       General: She is not in acute distress.     Appearance: She is well-developed. She is not diaphoretic.   HENT:      Head: Normocephalic and atraumatic.      Nose: Nose normal.   Eyes:      General:         Right eye: No discharge.         Left eye: No discharge.      Conjunctiva/sclera: Conjunctivae normal.      Pupils: Pupils are equal, round, and  reactive to light.   Neck:      Thyroid: No thyromegaly.      Vascular: No JVD.      Trachea: No tracheal deviation.   Cardiovascular:      Rate and Rhythm: Normal rate and regular rhythm.      Heart sounds: Normal heart sounds. No murmur heard.     No friction rub. No gallop.   Pulmonary:      Effort: Pulmonary effort is normal. No respiratory distress.      Breath sounds: Normal breath sounds. No stridor. No wheezing or rales.   Chest:      Chest wall: No tenderness.   Abdominal:      General: Bowel sounds are normal. There is no distension.      Palpations: Abdomen is soft. There is no mass.      Tenderness: There is no abdominal tenderness. There is no guarding or rebound.   Musculoskeletal:         General: No tenderness or deformity. Normal range of motion.      Cervical back: Normal range of motion and neck supple.      Right lower leg: Edema present.      Left lower leg: Edema present.   Lymphadenopathy:      Cervical: No cervical adenopathy.   Skin:     General: Skin is warm and dry.      Coloration: Skin is not pale.      Findings: No erythema or rash.   Neurological:      Mental Status: She is alert and oriented to person, place, and time.      Cranial Nerves: No cranial nerve deficit.      Motor: No abnormal muscle tone.      Coordination: Coordination normal.      Deep Tendon Reflexes: Reflexes normal.   Psychiatric:         Behavior: Behavior normal.         Thought Content: Thought content normal.         Judgment: Judgment normal.             Results Review:      Results from last 7 days   Lab Units 03/09/25  1003 03/08/25  0544 03/07/25  0541 03/06/25  0907 03/05/25  0744   SODIUM mmol/L 135* 136 136 136 132*   CO2 mmol/L 29.0 26.1 28.0 30.6* 26.8   BUN mg/dL 48* 48* 50* 51* 51*   CREATININE mg/dL 1.14* 1.19* 1.41* 1.75* 1.91*   CALCIUM mg/dL 9.5 8.9 9.7 9.8 9.5   ALBUMIN g/dL 3.0* 2.9* 3.1* 3.2* 3.1*   AST (SGOT) U/L 13 22 13 15 13   ALT (SGPT) U/L <5 <5 <5 <5 <5   EGFR mL/min/1.73 49.4* 46.9*  38.3* 29.5* 26.6*       Results from last 7 days   Lab Units 03/09/25  1003 03/08/25  0544 03/07/25  0541 03/06/25  0907 03/05/25  0744 03/04/25  0124 03/03/25  1712   WBC 10*3/mm3 7.45 7.03 8.45 7.20 8.11 8.18 9.73       Imaging studies: I personally reviewed the patient's most recent pertinent imaging studies       Medication Review:   apixaban, 5 mg, Oral, Q12H  DULoxetine, 40 mg, Oral, Daily  furosemide, 80 mg, Oral, BID Diuretics  levothyroxine, 137 mcg, Oral, Q AM  metoprolol succinate XL, 12.5 mg, Oral, Q24H  midodrine, 15 mg, Oral, Q8H  mirtazapine, 15 mg, Oral, Nightly  polyethylene glycol, 17 g, Oral, Daily  sennosides-docusate, 1 tablet, Oral, BID  sodium chloride, 10 mL, Intravenous, Q12H  sodium chloride, 10 mL, Intravenous, Q12H  tacrolimus, 0.5 mg, Oral, BID  vitamin B-12, 1,000 mcg, Oral, Daily          Assessment  TONYA chronic kidney disease increasing creatinine baseline likely secondary to hypotension from sepsis  [facility] 2007  UTI  Fluid overload  Hypotension  Viral URI  RUE swelling - AVF with thrill and bruit  Anemia      Plan:  Patient with tenderness from low blood pressures.  Continue midodrine..    Renal function stable creatinine is 1.1 Simcere is 135 potassium is 4.4.    Clinically still has  volume excess.  Low albumin also contributory to edema.  Continue current dose of Lasix.    Continue immunosuppression with Prograf.    Monitor renal function closely..      Konrad Osorio MD  03/09/25  16:29 EDT  Tel: [phone]  Fax: [phone]     Electronically signed by Konrad Osorio MD at 03/09/25 1631       Consult Notes (last 48 hours)  Notes from 03/09/25 0913 through 03/11/25 0913   No notes of this type exist for this encounter.       Nutrition Notes (most recent note)    No notes exist for this encounter.       Speech Language Pathology Notes (most recent note)    No notes exist for this encounter.       Astrid Oseguera, RN   Registered Nurse  Wound Care  Nursing Note       Signed  Date of Service:  03/10/25 1217  Creation Time:  03/10/25 1217     Signed        Kerlix+ace wraps removed. Pulses palpated. Skin dry and flaking. Edema appears controlled in lower legs.      R heel US - crusting noted on surrounding tissue, center of wound with moist white tissue covering wound bed. Small serosanguineous drainage noted.      R lat foot 3 - Full-thickness healing wound.  The wound is bright pink/red and moist.  Small to moderate amount of serosanguineous exudate is noted.  Approximate size is 1.5 x 1.5x0.1cm      L heel 3 - healing wound. Granulating. Scant serosanguineous drainage. 0.5cm circ open area. Additionally along the lateral side of the foot. There are some stage I pressure injuries and areas of nonblanchable erythema.      Bilateral lower extremities were cleansed. Lotion applied, calcium alginate applied to open wounds only, covered with silicone foam border. Wrapped with kerlix and 4in ace wraps from base of toes to below the knees. Feet placed in offloading boots upon completion.      Sacral/coccyx area with blanchable erythema. More consistent with MASD. Per primary RN patient has experienced incontinence. Recommend cleaning with bath wipe, apply barrier cream daily/prn for soiling.     Agiliti specialty bed in place.  Educated about pressure injury prevention.

## 2025-03-12 LAB
ALBUMIN SERPL-MCNC: 3 G/DL (ref 3.5–5.2)
ALBUMIN/GLOB SERPL: 1 G/DL
ALP SERPL-CCNC: 81 U/L (ref 39–117)
ALT SERPL W P-5'-P-CCNC: <5 U/L (ref 1–33)
ANION GAP SERPL CALCULATED.3IONS-SCNC: 7 MMOL/L (ref 5–15)
AST SERPL-CCNC: 11 U/L (ref 1–32)
BASOPHILS # BLD AUTO: 0.05 10*3/MM3 (ref 0–0.2)
BASOPHILS NFR BLD AUTO: 0.8 % (ref 0–1.5)
BILIRUB SERPL-MCNC: 0.4 MG/DL (ref 0–1.2)
BUN SERPL-MCNC: 53 MG/DL (ref 8–23)
BUN/CREAT SERPL: 45.7 (ref 7–25)
CALCIUM SPEC-SCNC: 9.7 MG/DL (ref 8.6–10.5)
CHLORIDE SERPL-SCNC: 96 MMOL/L (ref 98–107)
CO2 SERPL-SCNC: 33 MMOL/L (ref 22–29)
CREAT SERPL-MCNC: 1.16 MG/DL (ref 0.57–1)
DEPRECATED RDW RBC AUTO: 65.8 FL (ref 37–54)
EGFRCR SERPLBLD CKD-EPI 2021: 48.4 ML/MIN/1.73
EOSINOPHIL # BLD AUTO: 0.46 10*3/MM3 (ref 0–0.4)
EOSINOPHIL NFR BLD AUTO: 7.5 % (ref 0.3–6.2)
ERYTHROCYTE [DISTWIDTH] IN BLOOD BY AUTOMATED COUNT: 18 % (ref 12.3–15.4)
GLOBULIN UR ELPH-MCNC: 3.1 GM/DL
GLUCOSE SERPL-MCNC: 93 MG/DL (ref 65–99)
HCT VFR BLD AUTO: 26.9 % (ref 34–46.6)
HGB BLD-MCNC: 7.7 G/DL (ref 12–15.9)
IMM GRANULOCYTES # BLD AUTO: 0.02 10*3/MM3 (ref 0–0.05)
IMM GRANULOCYTES NFR BLD AUTO: 0.3 % (ref 0–0.5)
LYMPHOCYTES # BLD AUTO: 1.2 10*3/MM3 (ref 0.7–3.1)
LYMPHOCYTES NFR BLD AUTO: 19.5 % (ref 19.6–45.3)
MAGNESIUM SERPL-MCNC: 2 MG/DL (ref 1.6–2.4)
MCH RBC QN AUTO: 29.1 PG (ref 26.6–33)
MCHC RBC AUTO-ENTMCNC: 28.6 G/DL (ref 31.5–35.7)
MCV RBC AUTO: 101.5 FL (ref 79–97)
MONOCYTES # BLD AUTO: 0.77 10*3/MM3 (ref 0.1–0.9)
MONOCYTES NFR BLD AUTO: 12.5 % (ref 5–12)
NEUTROPHILS NFR BLD AUTO: 3.64 10*3/MM3 (ref 1.7–7)
NEUTROPHILS NFR BLD AUTO: 59.4 % (ref 42.7–76)
NRBC BLD AUTO-RTO: 0 /100 WBC (ref 0–0.2)
PHOSPHATE SERPL-MCNC: 4.1 MG/DL (ref 2.5–4.5)
PLATELET # BLD AUTO: 150 10*3/MM3 (ref 140–450)
PMV BLD AUTO: 9.4 FL (ref 6–12)
POTASSIUM SERPL-SCNC: 3.5 MMOL/L (ref 3.5–5.2)
PROT SERPL-MCNC: 6.1 G/DL (ref 6–8.5)
RBC # BLD AUTO: 2.65 10*6/MM3 (ref 3.77–5.28)
SODIUM SERPL-SCNC: 136 MMOL/L (ref 136–145)
WBC NRBC COR # BLD AUTO: 6.14 10*3/MM3 (ref 3.4–10.8)

## 2025-03-12 PROCEDURE — 84100 ASSAY OF PHOSPHORUS: CPT

## 2025-03-12 PROCEDURE — 80053 COMPREHEN METABOLIC PANEL: CPT

## 2025-03-12 PROCEDURE — 63710000001 TACROLIMUS PER 1 MG: Performed by: INTERNAL MEDICINE

## 2025-03-12 PROCEDURE — 99232 SBSQ HOSP IP/OBS MODERATE 35: CPT | Performed by: INTERNAL MEDICINE

## 2025-03-12 PROCEDURE — 25010000002 FUROSEMIDE PER 20 MG: Performed by: INTERNAL MEDICINE

## 2025-03-12 PROCEDURE — 83735 ASSAY OF MAGNESIUM: CPT

## 2025-03-12 PROCEDURE — 85025 COMPLETE CBC W/AUTO DIFF WBC: CPT

## 2025-03-12 RX ORDER — POTASSIUM CHLORIDE 1500 MG/1
20 TABLET, EXTENDED RELEASE ORAL DAILY
Status: DISCONTINUED | OUTPATIENT
Start: 2025-03-12 | End: 2025-03-13 | Stop reason: HOSPADM

## 2025-03-12 RX ORDER — HYDROCODONE BITARTRATE AND ACETAMINOPHEN 10; 325 MG/1; MG/1
1 TABLET ORAL EVERY 6 HOURS
Refills: 0 | Status: DISCONTINUED | OUTPATIENT
Start: 2025-03-12 | End: 2025-03-13 | Stop reason: HOSPADM

## 2025-03-12 RX ADMIN — HYDROCODONE BITARTRATE AND ACETAMINOPHEN 1 TABLET: 10; 325 TABLET ORAL at 10:25

## 2025-03-12 RX ADMIN — SENNOSIDES AND DOCUSATE SODIUM 1 TABLET: 50; 8.6 TABLET ORAL at 20:40

## 2025-03-12 RX ADMIN — MIDODRINE HYDROCHLORIDE 15 MG: 5 TABLET ORAL at 14:17

## 2025-03-12 RX ADMIN — Medication 10 ML: at 08:55

## 2025-03-12 RX ADMIN — LEVOTHYROXINE SODIUM 137 MCG: 0.03 TABLET ORAL at 05:20

## 2025-03-12 RX ADMIN — MIDODRINE HYDROCHLORIDE 15 MG: 5 TABLET ORAL at 05:21

## 2025-03-12 RX ADMIN — TACROLIMUS 0.5 MG: 0.5 CAPSULE ORAL at 20:39

## 2025-03-12 RX ADMIN — METOPROLOL SUCCINATE 12.5 MG: 25 TABLET, EXTENDED RELEASE ORAL at 08:55

## 2025-03-12 RX ADMIN — APIXABAN 5 MG: 5 TABLET, FILM COATED ORAL at 08:54

## 2025-03-12 RX ADMIN — DULOXETINE 40 MG: 20 CAPSULE, DELAYED RELEASE ORAL at 08:55

## 2025-03-12 RX ADMIN — HYDROXYZINE HYDROCHLORIDE 25 MG: 25 TABLET, FILM COATED ORAL at 09:07

## 2025-03-12 RX ADMIN — FUROSEMIDE 80 MG: 10 INJECTION, SOLUTION INTRAMUSCULAR; INTRAVENOUS at 08:55

## 2025-03-12 RX ADMIN — HYDROCODONE BITARTRATE AND ACETAMINOPHEN 1 TABLET: 10; 325 TABLET ORAL at 16:50

## 2025-03-12 RX ADMIN — POLYETHYLENE GLYCOL 3350 17 G: 17 POWDER, FOR SOLUTION ORAL at 08:55

## 2025-03-12 RX ADMIN — APIXABAN 5 MG: 5 TABLET, FILM COATED ORAL at 20:40

## 2025-03-12 RX ADMIN — METOPROLOL SUCCINATE 12.5 MG: 25 TABLET, EXTENDED RELEASE ORAL at 20:39

## 2025-03-12 RX ADMIN — HYDROCODONE BITARTRATE AND ACETAMINOPHEN 1 TABLET: 10; 325 TABLET ORAL at 05:19

## 2025-03-12 RX ADMIN — Medication 1000 MCG: at 08:54

## 2025-03-12 RX ADMIN — Medication 10 ML: at 20:40

## 2025-03-12 RX ADMIN — MIRTAZAPINE 15 MG: 15 TABLET, FILM COATED ORAL at 20:40

## 2025-03-12 RX ADMIN — TACROLIMUS 0.5 MG: 0.5 CAPSULE ORAL at 08:55

## 2025-03-12 RX ADMIN — Medication 10 ML: at 20:41

## 2025-03-12 RX ADMIN — POTASSIUM CHLORIDE 20 MEQ: 1500 TABLET, EXTENDED RELEASE ORAL at 09:07

## 2025-03-12 RX ADMIN — HYDROCODONE BITARTRATE AND ACETAMINOPHEN 1 TABLET: 10; 325 TABLET ORAL at 22:12

## 2025-03-12 RX ADMIN — SENNOSIDES AND DOCUSATE SODIUM 1 TABLET: 50; 8.6 TABLET ORAL at 08:54

## 2025-03-12 RX ADMIN — HYDROXYZINE HYDROCHLORIDE 25 MG: 25 TABLET, FILM COATED ORAL at 22:12

## 2025-03-12 RX ADMIN — FUROSEMIDE 80 MG: 10 INJECTION, SOLUTION INTRAMUSCULAR; INTRAVENOUS at 20:40

## 2025-03-12 NOTE — PLAN OF CARE
Goal Outcome Evaluation:           Progress: no change        Pt slept comfortably throughout the night with no complaints. On 6 liters of oxygen. Palliative is following. Safety precautions in place, will continue to monitor.

## 2025-03-12 NOTE — PLAN OF CARE
Goal Outcome Evaluation:         Patient alert and oriented, forgetful at times. Scheduled pain medication continued. Wound pictures taken. Titrated to 6L O2.

## 2025-03-12 NOTE — PROGRESS NOTES
LOS: 9 days   Patient Care Team:  Kvng Guillen MD as PCP - General (Family Medicine)  Jak Gavin MD as Cardiologist (Cardiology)    Subjective     Patient denies any new complaints    Review of Systems   Constitutional:  Positive for activity change, appetite change and fatigue.   HENT: Negative.     Respiratory: Negative.     Cardiovascular:  Positive for leg swelling.   Gastrointestinal: Negative.    Genitourinary: Negative.    Musculoskeletal:  Positive for gait problem.   Neurological:  Positive for weakness.   Psychiatric/Behavioral: Negative.             Objective     Vital Signs  Temp:  [97.2 °F (36.2 °C)-98.8 °F (37.1 °C)] 97.2 °F (36.2 °C)  Heart Rate:  [84-92] 89  Resp:  [14-21] 16  BP: ()/(54-62) 114/62      Physical Exam  Vitals reviewed.   Constitutional:       Appearance: She is not ill-appearing.   HENT:      Head: Normocephalic and atraumatic.      Right Ear: External ear normal.      Left Ear: External ear normal.      Nose: Nose normal.      Mouth/Throat:      Mouth: Mucous membranes are dry.   Eyes:      General:         Right eye: No discharge.         Left eye: No discharge.   Cardiovascular:      Rate and Rhythm: Normal rate and regular rhythm.      Pulses: Normal pulses.      Heart sounds: Normal heart sounds.   Pulmonary:      Effort: Pulmonary effort is normal.      Breath sounds: Normal breath sounds.   Abdominal:      General: Bowel sounds are normal.      Palpations: Abdomen is soft.   Musculoskeletal:         General: Normal range of motion.      Cervical back: Normal range of motion.   Skin:     General: Skin is warm and dry.   Neurological:      Mental Status: She is alert and oriented to person, place, and time.   Psychiatric:         Behavior: Behavior normal.              Results Review:    Lab Results (last 24 hours)       Procedure Component Value Units Date/Time    CBC & Differential [[id number]]  (Abnormal) Collected: 03/12/25 0457    Specimen: Blood Updated:  03/12/25 0600    Narrative:      The following orders were created for panel order CBC & Differential.  Procedure                               Abnormality         Status                     ---------                               -----------         ------                     CBC Auto Differential[[id number]]        Abnormal            Final result                 Please view results for these tests on the individual orders.    CBC Auto Differential [[id number]]  (Abnormal) Collected: 03/12/25 0451    Specimen: Blood Updated: 03/12/25 0600     WBC 6.14 10*3/mm3      RBC 2.65 10*6/mm3      Hemoglobin 7.7 g/dL      Hematocrit 26.9 %      .5 fL      MCH 29.1 pg      MCHC 28.6 g/dL      RDW 18.0 %      RDW-SD 65.8 fl      MPV 9.4 fL      Platelets 150 10*3/mm3      Neutrophil % 59.4 %      Lymphocyte % 19.5 %      Monocyte % 12.5 %      Eosinophil % 7.5 %      Basophil % 0.8 %      Immature Grans % 0.3 %      Neutrophils, Absolute 3.64 10*3/mm3      Lymphocytes, Absolute 1.20 10*3/mm3      Monocytes, Absolute 0.77 10*3/mm3      Eosinophils, Absolute 0.46 10*3/mm3      Basophils, Absolute 0.05 10*3/mm3      Immature Grans, Absolute 0.02 10*3/mm3      nRBC 0.0 /100 WBC     Magnesium [[id number]]  (Normal) Collected: 03/12/25 0451    Specimen: Blood Updated: 03/12/25 0552     Magnesium 2.0 mg/dL     Comprehensive Metabolic Panel [[id number]]  (Abnormal) Collected: 03/12/25 0451    Specimen: Blood Updated: 03/12/25 0552     Glucose 93 mg/dL      BUN 53 mg/dL      Creatinine 1.16 mg/dL      Sodium 136 mmol/L      Potassium 3.5 mmol/L      Chloride 96 mmol/L      CO2 33.0 mmol/L      Calcium 9.7 mg/dL      Total Protein 6.1 g/dL      Albumin 3.0 g/dL      ALT (SGPT) <5 U/L      AST (SGOT) 11 U/L      Alkaline Phosphatase 81 U/L      Total Bilirubin 0.4 mg/dL      Globulin 3.1 gm/dL      A/G Ratio 1.0 g/dL      BUN/Creatinine Ratio 45.7     Anion Gap 7.0 mmol/L      eGFR 48.4 mL/min/1.73     Narrative:      GFR Categories  in Chronic Kidney Disease (CKD)      GFR Category          GFR (mL/min/1.73)    Interpretation  G1                     90 or greater         Normal or high (1)  G2                      60-89                Mild decrease (1)  G3a                   45-59                Mild to moderate decrease  G3b                   30-44                Moderate to severe decrease  G4                    15-29                Severe decrease  G5                    14 or less           Kidney failure          (1)In the absence of evidence of kidney disease, neither GFR category G1 or G2 fulfill the criteria for CKD.    eGFR calculation 2021 CKD-EPI creatinine equation, which does not include race as a factor    Phosphorus [[id number]]  (Normal) Collected: 03/12/25 0451    Specimen: Blood Updated: 03/12/25 0549     Phosphorus 4.1 mg/dL              Imaging Results (Last 24 Hours)       ** No results found for the last 24 hours. **                 I reviewed the patient's new clinical results.    Medication Review:   Scheduled Meds:apixaban, 5 mg, Oral, Q12H  DULoxetine, 40 mg, Oral, Daily  furosemide, 80 mg, Intravenous, Q12H  HYDROcodone-acetaminophen, 1 tablet, Oral, Q6H  levothyroxine, 137 mcg, Oral, Q AM  metoprolol succinate XL, 12.5 mg, Oral, Q12H  midodrine, 15 mg, Oral, Q8H  mirtazapine, 15 mg, Oral, Nightly  polyethylene glycol, 17 g, Oral, Daily  potassium chloride, 20 mEq, Oral, Daily  sennosides-docusate, 1 tablet, Oral, BID  sodium chloride, 10 mL, Intravenous, Q12H  sodium chloride, 10 mL, Intravenous, Q12H  tacrolimus, 0.5 mg, Oral, BID  vitamin B-12, 1,000 mcg, Oral, Daily      Continuous Infusions:   PRN Meds:.  acetaminophen **OR** acetaminophen    albuterol    aluminum-magnesium hydroxide-simethicone    senna-docusate sodium **AND** polyethylene glycol **AND** bisacodyl **AND** bisacodyl    hydrOXYzine    ipratropium-albuterol    [Held by provider] LORazepam    nitroglycerin    ondansetron ODT **OR** ondansetron     prochlorperazine    [COMPLETED] Insert Peripheral IV **AND** sodium chloride    sodium chloride    sodium chloride    sodium chloride    sodium chloride    sodium chloride     Interval History:    Assessment & Plan     UTI (urinary tract infection)  Acute exacerbation of CHF (congestive heart failure)  End stage renal disease  Edema of right upper arm  TONYA (acute kidney injury)  Pressure injury of right heel, stage 3  Edema of both lower extremities due to peripheral venous insufficiency  COPD (chronic obstructive pulmonary disease)  Hypothyroidism, unspecified  History of kidney transplant  Long term (current) use of immunosuppressive biologic  Long term current use of anticoagulant therapy  History of DVT (deep vein thrombosis)  Pulmonary hypertension  Mixed anxiety and depressive disorder  Essential (primary) hypertension  Paroxysmal atrial fibrillation    Plan of care    Monitor off antibiotic nephrology following for diuresis and management. Creatinine is improving/ continue Prograf and midodrine for support.  She does have working fistula if needed for HD.  Wound care following    Nephrology transitioned back to IV diuretic. Continue current plan of care and medication    Patient is DNR/DNI/ today patient concern was that she does not have a quality of life. Son does help with decisions, palliative following    Wean oxygen, currently requiring 6 L    Plan for disposition::back to [facility]     TONYA Mathis  03/12/25  09:24 EDT

## 2025-03-12 NOTE — PROGRESS NOTES
"RENAL/KCC:     LOS: 9 days    Patient Care Team:  Kvng Guillen MD as PCP - General (Family Medicine)  Jak Gavin MD as Cardiologist (Cardiology)    Chief Complaint:  TONYA/CKD, Kidney Transplant    Subjective     Interval History:   Chart reviewed  Events noted    Objective     Vital Sign Min/Max for last 24 hours  Temp  Min: 97.3 °F (36.3 °C)  Max: 98.8 °F (37.1 °C)   BP  Min: 90/54  Max: 102/58   Pulse  Min: 84  Max: 102   Resp  Min: 14  Max: 21   SpO2  Min: 90 %  Max: 100 %   Flow (L/min) (Oxygen Therapy)  Min: 6  Max: 7   No data recorded     Flowsheet Rows      Flowsheet Row First Filed Value   Admission Height 167.7 cm (66.02\") Documented at 03/03/2025 1641   Admission Weight 81.6 kg (179 lb 14.3 oz) Documented at 03/03/2025 1641            No intake/output data recorded.  I/O last 3 completed shifts:  In: 600 [P.O.:600]  Out: 1900 [Urine:1900]    Physical Exam:  GEN: Awake, Anxious  ENT: PERRL, EOMI, MMM  NECK: Supple, no JVD  CHEST: CTAB, no W/R/C  CV: RRR, no M/G/R, +edema  ABD: Soft, NT, +BS  SKIN: Warm and Dry  NEURO: CN's intact      WBC WBC   Date Value Ref Range Status   03/12/2025 6.14 3.40 - 10.80 10*3/mm3 Final   03/11/2025 6.90 3.40 - 10.80 10*3/mm3 Final   03/09/2025 7.04 3.40 - 10.80 10*3/mm3 Final   03/09/2025 7.45 3.40 - 10.80 10*3/mm3 Final      HGB Hemoglobin   Date Value Ref Range Status   03/12/2025 7.7 (L) 12.0 - 15.9 g/dL Final   03/11/2025 7.7 (L) 12.0 - 15.9 g/dL Final   03/09/2025 7.7 (L) 12.0 - 15.9 g/dL Final   03/09/2025 8.0 (L) 12.0 - 15.9 g/dL Final      HCT Hematocrit   Date Value Ref Range Status   03/12/2025 26.9 (L) 34.0 - 46.6 % Final   03/11/2025 26.6 (L) 34.0 - 46.6 % Final   03/09/2025 26.3 (L) 34.0 - 46.6 % Final   03/09/2025 27.6 (L) 34.0 - 46.6 % Final      Platlets No results found for: \"LABPLAT\"   MCV MCV   Date Value Ref Range Status   03/12/2025 101.5 (H) 79.0 - 97.0 fL Final   03/11/2025 100.0 (H) 79.0 - 97.0 fL Final   03/09/2025 101.5 (H) 79.0 - 97.0 fL " "Final   03/09/2025 101.1 (H) 79.0 - 97.0 fL Final          Sodium Sodium   Date Value Ref Range Status   03/12/2025 136 136 - 145 mmol/L Final   03/11/2025 135 (L) 136 - 145 mmol/L Final   03/09/2025 134 (L) 136 - 145 mmol/L Final   03/09/2025 135 (L) 136 - 145 mmol/L Final      Potassium Potassium   Date Value Ref Range Status   03/12/2025 3.5 3.5 - 5.2 mmol/L Final   03/11/2025 4.0 3.5 - 5.2 mmol/L Final   03/09/2025 4.5 3.5 - 5.2 mmol/L Final   03/09/2025 4.4 3.5 - 5.2 mmol/L Final      Chloride Chloride   Date Value Ref Range Status   03/12/2025 96 (L) 98 - 107 mmol/L Final   03/11/2025 96 (L) 98 - 107 mmol/L Final   03/09/2025 96 (L) 98 - 107 mmol/L Final   03/09/2025 97 (L) 98 - 107 mmol/L Final      CO2 CO2   Date Value Ref Range Status   03/12/2025 33.0 (H) 22.0 - 29.0 mmol/L Final   03/11/2025 32.1 (H) 22.0 - 29.0 mmol/L Final   03/09/2025 28.8 22.0 - 29.0 mmol/L Final   03/09/2025 29.0 22.0 - 29.0 mmol/L Final      BUN BUN   Date Value Ref Range Status   03/12/2025 53 (H) 8 - 23 mg/dL Final   03/11/2025 48 (H) 8 - 23 mg/dL Final   03/09/2025 47 (H) 8 - 23 mg/dL Final   03/09/2025 48 (H) 8 - 23 mg/dL Final      Creatinine Creatinine   Date Value Ref Range Status   03/12/2025 1.16 (H) 0.57 - 1.00 mg/dL Final   03/11/2025 1.14 (H) 0.57 - 1.00 mg/dL Final   03/09/2025 1.14 (H) 0.57 - 1.00 mg/dL Final   03/09/2025 1.14 (H) 0.57 - 1.00 mg/dL Final      Calcium Calcium   Date Value Ref Range Status   03/12/2025 9.7 8.6 - 10.5 mg/dL Final   03/11/2025 9.5 8.6 - 10.5 mg/dL Final   03/09/2025 9.3 8.6 - 10.5 mg/dL Final   03/09/2025 9.5 8.6 - 10.5 mg/dL Final      PO4 No results found for: \"CAPO4\"   Albumin Albumin   Date Value Ref Range Status   03/12/2025 3.0 (L) 3.5 - 5.2 g/dL Final   03/11/2025 3.1 (L) 3.5 - 5.2 g/dL Final   03/09/2025 3.0 (L) 3.5 - 5.2 g/dL Final   03/09/2025 3.0 (L) 3.5 - 5.2 g/dL Final      Magnesium Magnesium   Date Value Ref Range Status   03/12/2025 2.0 1.6 - 2.4 mg/dL Final   03/11/2025 " "2.0 1.6 - 2.4 mg/dL Final   03/09/2025 2.0 1.6 - 2.4 mg/dL Final   03/09/2025 1.9 1.6 - 2.4 mg/dL Final      Uric Acid No results found for: \"URICACID\"        Results Review:     I reviewed the patient's new clinical results.    apixaban, 5 mg, Oral, Q12H  DULoxetine, 40 mg, Oral, Daily  furosemide, 80 mg, Intravenous, Q12H  HYDROcodone-acetaminophen, 1 tablet, Oral, Q6H  levothyroxine, 137 mcg, Oral, Q AM  metoprolol succinate XL, 12.5 mg, Oral, Q12H  midodrine, 15 mg, Oral, Q8H  mirtazapine, 15 mg, Oral, Nightly  polyethylene glycol, 17 g, Oral, Daily  potassium chloride, 20 mEq, Oral, Daily  sennosides-docusate, 1 tablet, Oral, BID  sodium chloride, 10 mL, Intravenous, Q12H  sodium chloride, 10 mL, Intravenous, Q12H  tacrolimus, 0.5 mg, Oral, BID  vitamin B-12, 1,000 mcg, Oral, Daily      Medication Review: Reviewed    Assessment & Plan     TONYA  CKD3  Kidney Transplant  UTI  Fluid overload  Hypotension  Viral URI  RUE swelling - AVF with thrill and bruit  Anemia     PLAN: Renal graft function stable.  Continue diuresis as tolerated and wean O2 as able.  Palliative following.      Naun Falcon MD  [facility]  03/12/25  08:41 EDT      "

## 2025-03-12 NOTE — PROGRESS NOTES
Referring Provider: Juanis Lowe MD    Reason for follow-up: acute CHF, hypotension, a-fib      Patient Care Team:  Kvng Guillen MD as PCP - General (Family Medicine)  Jak Gavin MD as Cardiologist (Cardiology)      SUBJECTIVE  Resting comfortably in bed.  Currently on 7 L of oxygen.  Blood pressure and heart rate are normal      ROS  Review of all systems negative except as indicated.    Since I have last seen, the patient has been without any chest discomfort, shortness of breath, palpitations, dizziness or syncope.  Denies having any headache, abdominal pain, nausea, vomiting, diarrhea, constipation, loss of weight or loss of appetite.  Denies having any excessive bruising, hematuria or blood in the stool.        Personal History:    Past Medical History:   Diagnosis Date    Allergic rhinitis 04/14/2015    Asthma 08/10/2017    Atrial flutter 05/11/2017    Chronic diarrhea 04/15/2019    Chronic hypoxemic respiratory failure 01/18/2024    Chronic pain 02/03/2015    COPD (chronic obstructive pulmonary disease)     COVID-19 virus detected 08/11/2022    Cytokine release syndrome, grade 1 08/16/2022    Edema of both lower extremities due to peripheral venous insufficiency 03/12/2021    ESRD on hemodialysis 05/22/2013    Essential (primary) hypertension 03/03/2022    Fracture of ulnar styloid 05/19/2022    Fracture of unspecified carpal bone, right wrist, subsequent encounter for fracture with routine healing 03/03/2022    Gastroesophageal reflux disease 11/12/2020    Gout 08/10/2017    Hearing loss 08/10/2017    History of appendectomy     History of DVT (deep vein thrombosis) 11/12/2020    History of kidney transplant 06/30/2017    History of lobectomy of lung 01/18/2024 03/08/2016:  RIGHT Lower Lobe Mass--> Right Video-assisted thoracoscopy with a moderate-to-large wedge resection of the RLL (by Dr. Haris Mcconnell @ [facility])--> Poorly differentiated carcinoma of the RLL.      History of repair of  hip joint 05/21/2013    History of suicide attempt 05/20/2024    Hyperlipidemia 08/11/2014    Hypothyroidism, unspecified 03/03/2022    Infection due to extended spectrum beta lactamase (ESBL) producing bacteria 03/11/2016    No A2K system hx. +ESBL E coli urine on 3/11/16.      Irritable bowel syndrome 04/15/2019    Long term (current) use of immunosuppressive biologic 04/28/2021    Long term current use of anticoagulant therapy 01/18/2024    Malignant neoplasm of lung 08/10/2017    Menopausal flushing 08/30/2021    Mitral valve regurgitation 07/06/2015    Mixed anxiety and depressive disorder 04/30/2015    Non-small cell lung cancer 08/10/2017    Nonobstructive atherosclerosis of coronary artery 06/02/2019 08/12/2022: CATH: Prasahnt: NSTEMI assoc with Covid-19: LM:-nl;  LAD: diffuse, Ca++; 30%; CIRC: Dominant. Normal; RCA: small; 50% diffuse, proximal and mid-segment.      NSTEMI (non-ST elevated myocardial infarction) 08/11/2022    Obsessive-compulsive disorder 04/15/2019    Osteoarthritis 05/20/2024    Paroxysmal atrial fibrillation 05/11/2017    Paroxysmal supraventricular tachycardia 05/11/2017    Peripheral neuropathy 08/11/2014    Personal history of peptic ulcer disease 11/12/2020    Postoperative anemia due to acute blood loss 05/22/2013    Right wrist fracture 03/01/2022    Seasonal allergies 08/10/2017    Secondary hyperparathyroidism of renal origin 09/14/2015    Tricuspid valve regurgitation 03/15/2017    Ulcer of lower extremity 08/30/2021    Unspecified acute appendicitis 03/03/2022    Valvular heart disease 05/20/2024    Wegener's granulomatosis with renal involvement 03/03/2022       Past Surgical History:   Procedure Laterality Date    APPENDECTOMY      ARTERIOVENOUS FISTULA/SHUNT SURGERY Right 12/3/2024    Procedure: FISTULOGRAM  and angioplasty RIGHT ARM;  Surgeon: Paulino Alva II, MD;  Location: [facility];  Service: Vascular;  Laterality: Right;    BREAST SURGERY Left      cysts rmeoved    CARDIAC CATHETERIZATION Right 08/12/2022    Procedure: Left Heart Cath and coronary angiogram;  Surgeon: Jak Gavin MD;  Location: [facility] CATH INVASIVE LOCATION;  Service: Cardiology;  Laterality: Right;    CLOSED REDUCTION WRIST FRACTURE Right 03/01/2022    Procedure: WRIST CLOSED REDUCTION;  Surgeon: Gaudencio Townsend MD;  Location: [facility] MAIN OR;  Service: Orthopedics;  Laterality: Right;    CYSTOSCOPY      HIP ARTHROPLASTY      HYSTERECTOMY      LUNG LOBECTOMY Right     TRANSPLANTATION RENAL         Family History   Problem Relation Age of Onset    No Known Problems Mother     No Known Problems Father        Social History     Tobacco Use    Smoking status: Former     Passive exposure: Current    Smokeless tobacco: Never   Vaping Use    Vaping status: Former   Substance Use Topics    Alcohol use: Never    Drug use: Never        Home meds:  Prior to Admission medications    Medication Sig Start Date End Date Taking? Authorizing Provider   amoxicillin-clavulanate (AUGMENTIN) 875-125 MG per tablet Take 1 tablet by mouth 2 (Two) Times a Day. 3/1/25  Yes Lorenzo Morales MD   apixaban (ELIQUIS) 5 MG tablet tablet Take 1 tablet by mouth Every 12 (Twelve) Hours. 8/16/22  Yes Clyde White DO   DULoxetine HCl 40 MG capsule delayed-release particles Take 1 capsule by mouth Daily. Indications: Major Depressive Disorder 7/11/24  Yes Court Vences MD   ferrous sulfate 325 (65 FE) MG tablet Take 1 tablet by mouth 3 times a day.   Yes Court Vences MD   furosemide (LASIX) 40 MG tablet Take 1 tablet by mouth 2 (Two) Times a Day.   Yes Court Vences MD   hydrOXYzine (ATARAX) 25 MG tablet Take 1 tablet by mouth Every Night.   Yes Court Vences MD   LORazepam (ATIVAN) 0.5 MG tablet Take 1 tablet by mouth Every 6 (Six) Hours.   Yes Court Vences MD   metoprolol tartrate (LOPRESSOR) 25 MG tablet Take 0.5 tablets by mouth 2 (Two) Times a Day.   Yes Court Vences  MD   midodrine (PROAMATINE) 5 MG tablet Take 3 tablets by mouth 3 (Three) Times a Day Before Meals.   Yes Court Vences MD   mirtazapine (REMERON) 15 MG tablet Take 1 tablet by mouth Every Night.   Yes Court Vences MD   polyethylene glycol (MiraLax) 17 GM/SCOOP powder Take 17 g by mouth Daily.   Yes Court Vences MD   predniSONE (DELTASONE) 5 MG tablet Take 1 tablet by mouth Daily. Indications: ACUTE EXACERBATION OF COPD (INACTIVE) 7/11/24  Yes Court Vences MD   sacubitril-valsartan (Entresto) 24-26 MG tablet Take 1 tablet by mouth 2 (Two) Times a Day.   Yes Court Vences MD   sennosides-docusate (PERICOLACE) 8.6-50 MG per tablet Take 1 tablet by mouth 2 (Two) Times a Day.   Yes Court Vences MD   simvastatin (ZOCOR) 20 MG tablet Take 1 tablet by mouth Every Night.   Yes Court Vences MD   tacrolimus (PROGRAF) 0.5 MG capsule Take 1 capsule by mouth 2 (Two) Times a Day. 6/13/24  Yes Janene Archer APRN   vitamin B-12 (CYANOCOBALAMIN) 1000 MCG tablet Take 1 tablet by mouth Daily.   Yes Court Vences MD   vitamin D (ERGOCALCIFEROL) 1.25 MG (39216 UT) capsule capsule Take 1 capsule by mouth 1 (One) Time Per Week.   Yes Court Vences MD   acetaminophen (Tylenol) 325 MG tablet Take 2 tablets by mouth Every 4 (Four) Hours As Needed for Fever or Mild Pain. Indications: Pain 3/13/20   Court Vences MD   albuterol sulfate  (90 Base) MCG/ACT inhaler Inhale 2 puffs Every 6 (Six) Hours As Needed for Wheezing.    ProviderCourt MD   HYDROcodone-acetaminophen (NORCO)  MG per tablet Take 1 tablet by mouth Every 6 (Six) Hours.    Court Vences MD       Allergies:  Zolpidem    Scheduled Meds:apixaban, 5 mg, Oral, Q12H  DULoxetine, 40 mg, Oral, Daily  furosemide, 80 mg, Intravenous, Q12H  HYDROcodone-acetaminophen, 1 tablet, Oral, Q6H  levothyroxine, 137 mcg, Oral, Q AM  metoprolol succinate XL, 12.5 mg, Oral,  "Q12H  midodrine, 15 mg, Oral, Q8H  mirtazapine, 15 mg, Oral, Nightly  polyethylene glycol, 17 g, Oral, Daily  sennosides-docusate, 1 tablet, Oral, BID  sodium chloride, 10 mL, Intravenous, Q12H  sodium chloride, 10 mL, Intravenous, Q12H  tacrolimus, 0.5 mg, Oral, BID  vitamin B-12, 1,000 mcg, Oral, Daily      Continuous Infusions:   PRN Meds:.  acetaminophen **OR** acetaminophen    albuterol    aluminum-magnesium hydroxide-simethicone    senna-docusate sodium **AND** polyethylene glycol **AND** bisacodyl **AND** bisacodyl    hydrOXYzine    ipratropium-albuterol    [Held by provider] LORazepam    nitroglycerin    ondansetron ODT **OR** ondansetron    prochlorperazine    [COMPLETED] Insert Peripheral IV **AND** sodium chloride    sodium chloride    sodium chloride    sodium chloride    sodium chloride    sodium chloride      OBJECTIVE    Vital Signs  Vitals:    03/11/25 2150 03/11/25 2154 03/12/25 0030 03/12/25 0410   BP:   90/54 100/58   BP Location:   Left arm Left arm   Patient Position:   Lying Lying   Pulse: 84 92 92 91   Resp: 19 17 14 17   Temp:   98.8 °F (37.1 °C) 98.2 °F (36.8 °C)   TempSrc:   Oral Oral   SpO2: 97% 100% 99% 96%   Weight:       Height:           Flowsheet Rows      Flowsheet Row First Filed Value   Admission Height 167.7 cm (66.02\") Documented at 03/03/2025 1641   Admission Weight 81.6 kg (179 lb 14.3 oz) Documented at 03/03/2025 1641              Intake/Output Summary (Last 24 hours) at 3/12/2025 0701  Last data filed at 3/11/2025 1940  Gross per 24 hour   Intake 480 ml   Output 600 ml   Net -120 ml          Telemetry: Atrial fibrillation with rapid ventricular rate    Physical Exam:  The patient is alert and in no distress. She is frail and appears chronically ill.   Vital signs as noted above.  Head and neck revealed no carotid bruits or jugular venous distention.   Lungs with scattered rhonchi and diminished bases.  No wheezing.  On oxygen at 4 liters per high flow nasal cannula.   Heart " normal first and second heart sounds.  No murmur. No precordial rub is present.  No gallop is present.  Abdomen soft and nontender.    Extremities with good peripheral pulses with 1+ edema in BLE.  Skin warm and dry.  Musculoskeletal system is grossly normal.  CNS:  she is oriented to person only      Results Review:  I have personally reviewed the results from the time of this admission to 3/12/2025 07:01 EDT and agree with these findings:  [x]  Laboratory  [x]  Microbiology  [x]  Radiology  [x]  EKG/Telemetry   [x]  Cardiology/Vascular   []  Pathology  [x]  Old records  []  Other:    Most notable findings include:    Lab Results (last 24 hours)       Procedure Component Value Units Date/Time    CBC & Differential [[id number]]  (Abnormal) Collected: 03/12/25 0451    Specimen: Blood Updated: 03/12/25 0600    Narrative:      The following orders were created for panel order CBC & Differential.  Procedure                               Abnormality         Status                     ---------                               -----------         ------                     CBC Auto Differential[[id number]]        Abnormal            Final result                 Please view results for these tests on the individual orders.    CBC Auto Differential [[id number]]  (Abnormal) Collected: 03/12/25 0451    Specimen: Blood Updated: 03/12/25 0600     WBC 6.14 10*3/mm3      RBC 2.65 10*6/mm3      Hemoglobin 7.7 g/dL      Hematocrit 26.9 %      .5 fL      MCH 29.1 pg      MCHC 28.6 g/dL      RDW 18.0 %      RDW-SD 65.8 fl      MPV 9.4 fL      Platelets 150 10*3/mm3      Neutrophil % 59.4 %      Lymphocyte % 19.5 %      Monocyte % 12.5 %      Eosinophil % 7.5 %      Basophil % 0.8 %      Immature Grans % 0.3 %      Neutrophils, Absolute 3.64 10*3/mm3      Lymphocytes, Absolute 1.20 10*3/mm3      Monocytes, Absolute 0.77 10*3/mm3      Eosinophils, Absolute 0.46 10*3/mm3      Basophils, Absolute 0.05 10*3/mm3      Immature Grans,  Absolute 0.02 10*3/mm3      nRBC 0.0 /100 WBC     Magnesium [[id number]]  (Normal) Collected: 03/12/25 0451    Specimen: Blood Updated: 03/12/25 0552     Magnesium 2.0 mg/dL     Comprehensive Metabolic Panel [[id number]]  (Abnormal) Collected: 03/12/25 0451    Specimen: Blood Updated: 03/12/25 0552     Glucose 93 mg/dL      BUN 53 mg/dL      Creatinine 1.16 mg/dL      Sodium 136 mmol/L      Potassium 3.5 mmol/L      Chloride 96 mmol/L      CO2 33.0 mmol/L      Calcium 9.7 mg/dL      Total Protein 6.1 g/dL      Albumin 3.0 g/dL      ALT (SGPT) <5 U/L      AST (SGOT) 11 U/L      Alkaline Phosphatase 81 U/L      Total Bilirubin 0.4 mg/dL      Globulin 3.1 gm/dL      A/G Ratio 1.0 g/dL      BUN/Creatinine Ratio 45.7     Anion Gap 7.0 mmol/L      eGFR 48.4 mL/min/1.73     Narrative:      GFR Categories in Chronic Kidney Disease (CKD)      GFR Category          GFR (mL/min/1.73)    Interpretation  G1                     90 or greater         Normal or high (1)  G2                      60-89                Mild decrease (1)  G3a                   45-59                Mild to moderate decrease  G3b                   30-44                Moderate to severe decrease  G4                    15-29                Severe decrease  G5                    14 or less           Kidney failure          (1)In the absence of evidence of kidney disease, neither GFR category G1 or G2 fulfill the criteria for CKD.    eGFR calculation 2021 CKD-EPI creatinine equation, which does not include race as a factor    Phosphorus [[id number]]  (Normal) Collected: 03/12/25 0451    Specimen: Blood Updated: 03/12/25 0549     Phosphorus 4.1 mg/dL             Imaging Results (Last 24 Hours)       ** No results found for the last 24 hours. **            LAB RESULTS (LAST 7 DAYS)    CBC  Results from last 7 days   Lab Units 03/12/25 0451 03/11/25  0310 03/09/25  2334 03/09/25  1003 03/08/25  0544 03/07/25  0541 03/06/25  0907   WBC 10*3/mm3 6.14 6.90 7.04 7.45  7.03 8.45 7.20   RBC 10*6/mm3 2.65* 2.66* 2.59* 2.73* 2.80* 2.81* 2.88*   HEMOGLOBIN g/dL 7.7* 7.7* 7.7* 8.0* 8.3* 8.4* 8.6*   HEMATOCRIT % 26.9* 26.6* 26.3* 27.6* 28.4* 28.7* 29.6*   MCV fL 101.5* 100.0* 101.5* 101.1* 101.4* 102.1* 102.8*   PLATELETS 10*3/mm3 150 145 149 148 132* 161 167       BMP  Results from last 7 days   Lab Units 03/12/25  0451 03/11/25 0310 03/09/25  2334 03/09/25  1003 03/08/25  0544 03/07/25  0541 03/06/25  0907   SODIUM mmol/L 136 135* 134* 135* 136 136 136   POTASSIUM mmol/L 3.5 4.0 4.5 4.4 4.7 4.7 4.9   CHLORIDE mmol/L 96* 96* 96* 97* 101 97* 98   CO2 mmol/L 33.0* 32.1* 28.8 29.0 26.1 28.0 30.6*   BUN mg/dL 53* 48* 47* 48* 48* 50* 51*   CREATININE mg/dL 1.16* 1.14* 1.14* 1.14* 1.19* 1.41* 1.75*   GLUCOSE mg/dL 93 106* 103* 131* 76 83 96   MAGNESIUM mg/dL 2.0 2.0 2.0 1.9 1.8 2.0 2.0   PHOSPHORUS mg/dL 4.1 3.9 3.8 3.8 3.2 3.3 3.3       CMP   Results from last 7 days   Lab Units 03/12/25  0451 03/11/25 0310 03/09/25  2334 03/09/25  1003 03/08/25  0544 03/07/25  0541 03/06/25  0907   SODIUM mmol/L 136 135* 134* 135* 136 136 136   POTASSIUM mmol/L 3.5 4.0 4.5 4.4 4.7 4.7 4.9   CHLORIDE mmol/L 96* 96* 96* 97* 101 97* 98   CO2 mmol/L 33.0* 32.1* 28.8 29.0 26.1 28.0 30.6*   BUN mg/dL 53* 48* 47* 48* 48* 50* 51*   CREATININE mg/dL 1.16* 1.14* 1.14* 1.14* 1.19* 1.41* 1.75*   GLUCOSE mg/dL 93 106* 103* 131* 76 83 96   ALBUMIN g/dL 3.0* 3.1* 3.0* 3.0* 2.9* 3.1* 3.2*   BILIRUBIN mg/dL 0.4 0.4 0.4 0.5 0.4 0.5 0.5   ALK PHOS U/L 81 80 76 74 70 72 75   AST (SGOT) U/L 11 13 13 13 22 13 15   ALT (SGPT) U/L <5 <5 5 <5 <5 <5 <5       BNP        TROPONIN          CoAg        Creatinine Clearance  Estimated Creatinine Clearance: 41.5 mL/min (A) (by C-G formula based on SCr of 1.16 mg/dL (H)).    ABG          Radiology  No radiology results for the last day      EKG  I personally viewed and interpreted the patient's EKG/Telemetry data:  ECG 12 Lead Altered Mental Status   Final Result   HEART RATE=96  bpm    RR Kntbpyec=260  ms   MA Interval=  ms   P Horizontal Axis=  deg   P Front Axis=  deg   QRSD Interval=80  ms   QT Cibiacfg=487  ms   QSjF=455  ms   QRS Axis=-39  deg   T Wave Axis=90  deg   - ABNORMAL ECG -   Atrial fibrillation   Ventricular premature complex   Left axis deviation   Anteroseptal  infarct, age indeterminate   When compared with ECG of 01-Mar-2025 09:40:43,   Significant axis, voltage or hypertrophy change   Electronically Signed By: Paulino Miller (TWILA) 2025-03-04 07:28:16   Date and Time of Study:2025-03-03 16:45:51      Telemetry Scan   Final Result      Telemetry Scan   Final Result      Telemetry Scan   Final Result      Telemetry Scan   Final Result      Telemetry Scan   Final Result      Telemetry Scan   Final Result      Telemetry Scan   Final Result      Telemetry Scan   Final Result      Telemetry Scan   Final Result      Telemetry Scan   Final Result      Telemetry Scan   Final Result            Echocardiogram:    Results for orders placed during the hospital encounter of 11/29/24    Adult Transthoracic Echo Complete W/ Cont if Necessary Per Protocol    Interpretation Summary    Left ventricular systolic function is normal. Calculated left ventricular EF = 70% Left ventricular ejection fraction appears to be 66 - 70%.    Left ventricular wall thickness is consistent with moderate to severe concentric hypertrophy.    Left ventricular diastolic function is consistent with (grade III w/high LAP) reversible restrictive pattern.    Moderately reduced right ventricular systolic function noted.    The right ventricular cavity is severely dilated.    The left atrial cavity is severely dilated.    The right atrial cavity is severely  dilated.    There is mild calcification of the aortic valve mainly affecting the left coronary and right coronary cusp(s).    Severe tricuspid valve regurgitation is present.    Estimated right ventricular systolic pressure from tricuspid regurgitation is markedly  elevated (>55 mmHg).    Severe pulmonary hypertension is present.    There is a moderate sized left pleural effusion.        Stress Test:         Cardiac Catheterization:  Results for orders placed during the hospital encounter of 08/11/22    Cardiac Catheterization/Vascular Study    Conclusion  IMPRESSIONS  Non obstructive CAD         Other:         ASSESSMENT & PLAN:    Principal Problem:    UTI (urinary tract infection)  Active Problems:    COPD (chronic obstructive pulmonary disease)    Hypothyroidism, unspecified    History of kidney transplant    Long term (current) use of immunosuppressive biologic    Long term current use of anticoagulant therapy    History of DVT (deep vein thrombosis)    Pulmonary hypertension    Decubitus ulcer of foot, stage 3    Edema of both lower extremities due to peripheral venous insufficiency    Mixed anxiety and depressive disorder    Essential (primary) hypertension    Paroxysmal atrial fibrillation    Acute exacerbation of CHF (congestive heart failure)    End stage renal disease    Edema of right upper arm    TONYA (acute kidney injury)    Pressure injury of right heel, stage 3      Atrial fibrillation with rapid ventricular rate  Target heart rate less than 100  Heart rate is better now, in the 90s  Continue Toprol-XL: Can uptitrate if blood pressure allows  [id number] score is 5  Continue Eliquis      Acute on chronic HFpEF / Pulmonary hypertension  Echocardiogram shows preserved LV function with severe tricuspid valve regurgitation and severe pulmonary hypertension.  Presented with proBNP of 17,000 (compared to 4800 during previous admission)  Currently on IV diuretics.  Dosing per nephrology  Metolazone as needed  Unable to add GDMT due to renal dysfunction and hypotension requiring midodrine  Strict I&Os and daily weight     Hypotension  Currently on midodrine  Entresto is on hold due to hypotension     History of kidney transplant / TONYA on CKD  AV fistula in place, but  no on hemodialysis   On tacrolimus  Creatinine stable 1.16, GFR is 48.4  Closely monitor renal function while on diuretics     Right IJ DVT  Diagnosed in December 2024  Repeat venous duplex is negative for DVT  VQ scan with low probability of PE  Continue Eliquis     Anemia in CKD   H&H 7.7/26.9  Monitor H&H while on anticoagulation/Eliquis  Transfuse as needed     COPD  History of lung cancer  7 L of oxygen  Continue bronchodilators     Anxiety/depression  Currently on duloxetine and mirtazapine     CODE STATUS is DNI DNR.  Palliative care consultation noted

## 2025-03-13 VITALS
RESPIRATION RATE: 20 BRPM | DIASTOLIC BLOOD PRESSURE: 46 MMHG | SYSTOLIC BLOOD PRESSURE: 92 MMHG | HEART RATE: 84 BPM | TEMPERATURE: 97.2 F | HEIGHT: 66 IN | WEIGHT: 166 LBS | OXYGEN SATURATION: 100 % | BODY MASS INDEX: 26.68 KG/M2

## 2025-03-13 LAB
ALBUMIN SERPL-MCNC: 3 G/DL (ref 3.5–5.2)
ALBUMIN/GLOB SERPL: 0.9 G/DL
ALP SERPL-CCNC: 86 U/L (ref 39–117)
ALT SERPL W P-5'-P-CCNC: <5 U/L (ref 1–33)
ANION GAP SERPL CALCULATED.3IONS-SCNC: 6.9 MMOL/L (ref 5–15)
AST SERPL-CCNC: 13 U/L (ref 1–32)
BASOPHILS # BLD AUTO: 0.07 10*3/MM3 (ref 0–0.2)
BASOPHILS NFR BLD AUTO: 1 % (ref 0–1.5)
BILIRUB SERPL-MCNC: 0.4 MG/DL (ref 0–1.2)
BUN SERPL-MCNC: 53 MG/DL (ref 8–23)
BUN/CREAT SERPL: 44.2 (ref 7–25)
CALCIUM SPEC-SCNC: 9.6 MG/DL (ref 8.6–10.5)
CHLORIDE SERPL-SCNC: 96 MMOL/L (ref 98–107)
CO2 SERPL-SCNC: 33.1 MMOL/L (ref 22–29)
CREAT SERPL-MCNC: 1.2 MG/DL (ref 0.57–1)
DEPRECATED RDW RBC AUTO: 63.9 FL (ref 37–54)
EGFRCR SERPLBLD CKD-EPI 2021: 46.4 ML/MIN/1.73
EOSINOPHIL # BLD AUTO: 0.46 10*3/MM3 (ref 0–0.4)
EOSINOPHIL NFR BLD AUTO: 6.5 % (ref 0.3–6.2)
ERYTHROCYTE [DISTWIDTH] IN BLOOD BY AUTOMATED COUNT: 17.8 % (ref 12.3–15.4)
GLOBULIN UR ELPH-MCNC: 3.2 GM/DL
GLUCOSE SERPL-MCNC: 117 MG/DL (ref 65–99)
HCT VFR BLD AUTO: 25.6 % (ref 34–46.6)
HGB BLD-MCNC: 7.6 G/DL (ref 12–15.9)
IMM GRANULOCYTES # BLD AUTO: 0.02 10*3/MM3 (ref 0–0.05)
IMM GRANULOCYTES NFR BLD AUTO: 0.3 % (ref 0–0.5)
LYMPHOCYTES # BLD AUTO: 1.5 10*3/MM3 (ref 0.7–3.1)
LYMPHOCYTES NFR BLD AUTO: 21.3 % (ref 19.6–45.3)
MAGNESIUM SERPL-MCNC: 1.9 MG/DL (ref 1.6–2.4)
MCH RBC QN AUTO: 29.9 PG (ref 26.6–33)
MCHC RBC AUTO-ENTMCNC: 29.7 G/DL (ref 31.5–35.7)
MCV RBC AUTO: 100.8 FL (ref 79–97)
MONOCYTES # BLD AUTO: 0.9 10*3/MM3 (ref 0.1–0.9)
MONOCYTES NFR BLD AUTO: 12.8 % (ref 5–12)
NEUTROPHILS NFR BLD AUTO: 4.1 10*3/MM3 (ref 1.7–7)
NEUTROPHILS NFR BLD AUTO: 58.1 % (ref 42.7–76)
NRBC BLD AUTO-RTO: 0 /100 WBC (ref 0–0.2)
PHOSPHATE SERPL-MCNC: 3.8 MG/DL (ref 2.5–4.5)
PLATELET # BLD AUTO: 155 10*3/MM3 (ref 140–450)
PMV BLD AUTO: 9.5 FL (ref 6–12)
POTASSIUM SERPL-SCNC: 3.6 MMOL/L (ref 3.5–5.2)
PROT SERPL-MCNC: 6.2 G/DL (ref 6–8.5)
RBC # BLD AUTO: 2.54 10*6/MM3 (ref 3.77–5.28)
SODIUM SERPL-SCNC: 136 MMOL/L (ref 136–145)
WBC NRBC COR # BLD AUTO: 7.05 10*3/MM3 (ref 3.4–10.8)

## 2025-03-13 PROCEDURE — 94799 UNLISTED PULMONARY SVC/PX: CPT

## 2025-03-13 PROCEDURE — 94664 DEMO&/EVAL PT USE INHALER: CPT

## 2025-03-13 PROCEDURE — 99233 SBSQ HOSP IP/OBS HIGH 50: CPT | Performed by: INTERNAL MEDICINE

## 2025-03-13 PROCEDURE — 85025 COMPLETE CBC W/AUTO DIFF WBC: CPT

## 2025-03-13 PROCEDURE — 80053 COMPREHEN METABOLIC PANEL: CPT

## 2025-03-13 PROCEDURE — 83735 ASSAY OF MAGNESIUM: CPT

## 2025-03-13 PROCEDURE — 63710000001 TACROLIMUS PER 1 MG: Performed by: INTERNAL MEDICINE

## 2025-03-13 PROCEDURE — 84100 ASSAY OF PHOSPHORUS: CPT

## 2025-03-13 PROCEDURE — 94761 N-INVAS EAR/PLS OXIMETRY MLT: CPT

## 2025-03-13 RX ORDER — FUROSEMIDE 80 MG/1
80 TABLET ORAL 2 TIMES DAILY
Qty: 60 TABLET | Refills: 5 | Status: SHIPPED | OUTPATIENT
Start: 2025-03-13

## 2025-03-13 RX ORDER — HYDROXYZINE HYDROCHLORIDE 25 MG/1
25 TABLET, FILM COATED ORAL 3 TIMES DAILY PRN
Qty: 30 TABLET | Refills: 1 | Status: SHIPPED | OUTPATIENT
Start: 2025-03-13

## 2025-03-13 RX ORDER — ACETAMINOPHEN 325 MG/1
650 TABLET ORAL EVERY 4 HOURS PRN
Start: 2025-03-13

## 2025-03-13 RX ORDER — ONDANSETRON 4 MG/1
4 TABLET, ORALLY DISINTEGRATING ORAL EVERY 6 HOURS PRN
Start: 2025-03-13

## 2025-03-13 RX ORDER — FERROUS SULFATE 325(65) MG
325 TABLET ORAL
Qty: 30 TABLET | Refills: 5 | Status: SHIPPED | OUTPATIENT
Start: 2025-03-13

## 2025-03-13 RX ORDER — LEVOTHYROXINE SODIUM 137 UG/1
137 TABLET ORAL
Qty: 30 TABLET | Refills: 5 | Status: SHIPPED | OUTPATIENT
Start: 2025-03-14

## 2025-03-13 RX ORDER — HYDROCODONE BITARTRATE AND ACETAMINOPHEN 10; 325 MG/1; MG/1
1 TABLET ORAL EVERY 6 HOURS
Qty: 28 TABLET | Refills: 0 | Status: SHIPPED | OUTPATIENT
Start: 2025-03-13

## 2025-03-13 RX ORDER — METOPROLOL SUCCINATE 25 MG/1
12.5 TABLET, EXTENDED RELEASE ORAL EVERY 12 HOURS SCHEDULED
Qty: 30 TABLET | Refills: 5 | Status: SHIPPED | OUTPATIENT
Start: 2025-03-13

## 2025-03-13 RX ORDER — ECHINACEA PURPUREA EXTRACT 125 MG
2 TABLET ORAL AS NEEDED
Qty: 1 EACH | Refills: 12 | Status: SHIPPED | OUTPATIENT
Start: 2025-03-13

## 2025-03-13 RX ORDER — MIDODRINE HYDROCHLORIDE 5 MG/1
15 TABLET ORAL EVERY 8 HOURS SCHEDULED
Qty: 270 TABLET | Refills: 3 | Status: SHIPPED | OUTPATIENT
Start: 2025-03-13

## 2025-03-13 RX ORDER — IPRATROPIUM BROMIDE AND ALBUTEROL SULFATE 2.5; .5 MG/3ML; MG/3ML
3 SOLUTION RESPIRATORY (INHALATION) EVERY 6 HOURS PRN
Start: 2025-03-13

## 2025-03-13 RX ORDER — FUROSEMIDE 40 MG/1
80 TABLET ORAL
Status: DISCONTINUED | OUTPATIENT
Start: 2025-03-13 | End: 2025-03-13 | Stop reason: HOSPADM

## 2025-03-13 RX ORDER — POTASSIUM CHLORIDE 1500 MG/1
20 TABLET, EXTENDED RELEASE ORAL DAILY
Qty: 30 TABLET | Refills: 5 | Status: SHIPPED | OUTPATIENT
Start: 2025-03-14

## 2025-03-13 RX ORDER — NITROGLYCERIN 0.4 MG/1
0.4 TABLET SUBLINGUAL
Start: 2025-03-13

## 2025-03-13 RX ADMIN — LEVOTHYROXINE SODIUM 137 MCG: 0.03 TABLET ORAL at 04:25

## 2025-03-13 RX ADMIN — Medication 10 ML: at 08:10

## 2025-03-13 RX ADMIN — METOPROLOL SUCCINATE 12.5 MG: 25 TABLET, EXTENDED RELEASE ORAL at 08:08

## 2025-03-13 RX ADMIN — FUROSEMIDE 80 MG: 40 TABLET ORAL at 08:47

## 2025-03-13 RX ADMIN — HYDROCODONE BITARTRATE AND ACETAMINOPHEN 1 TABLET: 10; 325 TABLET ORAL at 17:07

## 2025-03-13 RX ADMIN — IPRATROPIUM BROMIDE AND ALBUTEROL SULFATE 3 ML: .5; 3 SOLUTION RESPIRATORY (INHALATION) at 13:13

## 2025-03-13 RX ADMIN — MIDODRINE HYDROCHLORIDE 15 MG: 5 TABLET ORAL at 13:03

## 2025-03-13 RX ADMIN — Medication 1000 MCG: at 08:07

## 2025-03-13 RX ADMIN — DULOXETINE 40 MG: 20 CAPSULE, DELAYED RELEASE ORAL at 08:07

## 2025-03-13 RX ADMIN — HYDROXYZINE HYDROCHLORIDE 25 MG: 25 TABLET, FILM COATED ORAL at 10:27

## 2025-03-13 RX ADMIN — POTASSIUM CHLORIDE 20 MEQ: 1500 TABLET, EXTENDED RELEASE ORAL at 08:07

## 2025-03-13 RX ADMIN — MIDODRINE HYDROCHLORIDE 15 MG: 5 TABLET ORAL at 04:25

## 2025-03-13 RX ADMIN — APIXABAN 5 MG: 5 TABLET, FILM COATED ORAL at 08:07

## 2025-03-13 RX ADMIN — HYDROCODONE BITARTRATE AND ACETAMINOPHEN 1 TABLET: 10; 325 TABLET ORAL at 04:25

## 2025-03-13 RX ADMIN — Medication 10 ML: at 08:09

## 2025-03-13 RX ADMIN — HYDROCODONE BITARTRATE AND ACETAMINOPHEN 1 TABLET: 10; 325 TABLET ORAL at 10:18

## 2025-03-13 RX ADMIN — TACROLIMUS 0.5 MG: 0.5 CAPSULE ORAL at 08:10

## 2025-03-13 NOTE — SIGNIFICANT NOTE
03/13/25 1312   OTHER   Discipline physical therapy assistant   Rehab Time/Intention   Session Not Performed other (see comments)  (Pending hospital discharge. Will f/u next service date if still admitted)   Recommendation   PT - Next Appointment 03/14/25

## 2025-03-13 NOTE — PLAN OF CARE
Problem: Adult Inpatient Plan of Care  Goal: Plan of Care Review  Outcome: Progressing  Flowsheets  Taken 3/13/2025 0248 by Milly Lo RN  Progress: improving  Outcome Evaluation: Patient slept well through the night. Complaints of right hip pain. Pain medications administered per mar. Currently on 6L HF. Continues to receive diuretics. Falls precautions in place. Q2 turns to prevent further skin injury.  Taken 3/10/2025 0229 by Jairo Henley RN  Plan of Care Reviewed With: patient   Goal Outcome Evaluation:           Progress: improving  Outcome Evaluation: Patient slept well through the night. Complaints of right hip pain. Pain medications administered per mar. Currently on 6L HF. Continues to receive diuretics. Falls precautions in place. Q2 turns to prevent further skin injury.

## 2025-03-13 NOTE — CASE MANAGEMENT/SOCIAL WORK
Continued Stay Note   [place]     Patient Name: Jaja Carcamo  MRN: [id number]  Today's Date: 3/13/2025    Admit Date: 3/3/2025    Plan: D/C Plan: [facility], [facility].  Precert for skilled denied.  No new PASRR needed.  Transport TBD   Discharge Plan       Row Name 03/13/25 1006       Plan    Plan D/C Plan: [facility].  Precert for skilled denied.  No new PASRR needed.  Transport TBD                 Expected Discharge Date and Time       Expected Discharge Date Expected Discharge Time    Mar 14, 2025               Claire Castro RN  RN/.  Office Ph. [phone]  Cell Ph.  [phone]

## 2025-03-13 NOTE — DISCHARGE SUMMARY
Date of Discharge:  3/13/2025    Discharge Diagnosis:   **UTI (urinary tract infection) [N39.0]   Pressure injury of right heel, stage 3 [L89.613]   TONYA (acute kidney injury) [N17.9]   Edema of right upper arm [R60.0]   End stage renal disease [N18.6]   Acute exacerbation of CHF (congestive heart failure) [I50.9]   Long term current use of anticoagulant therapy [Z79.01]   COPD (chronic obstructive pulmonary disease) [J44.9]   Hypothyroidism, unspecified [E03.9]   Essential (primary) hypertension [I10]   Decubitus ulcer of foot, stage 3 [L89.893]   Long term (current) use of immunosuppressive biologic [Z79.620]   Edema of both lower extremities due to peripheral venous insufficiency [I87.2]   History of DVT (deep vein thrombosis) [Z86.718]   History of kidney transplant [Z94.0]   Paroxysmal atrial fibrillation [I48.0]   Pulmonary hypertension [I27.20]   Mixed anxiety and depressive disorder [F41.8]       Presenting Problem/History of Present Illness  Active Hospital Problems    Diagnosis  POA    **UTI (urinary tract infection) [N39.0]  Yes    Pressure injury of right heel, stage 3 [L89.613]  Yes    TONYA (acute kidney injury) [N17.9]  Yes    Edema of right upper arm [R60.0]  Yes    End stage renal disease [N18.6]  Yes    Acute exacerbation of CHF (congestive heart failure) [I50.9]  Yes    Long term current use of anticoagulant therapy [Z79.01]  Not Applicable    COPD (chronic obstructive pulmonary disease) [J44.9]  Yes    Hypothyroidism, unspecified [E03.9]  Yes    Essential (primary) hypertension [I10]  Yes    Decubitus ulcer of foot, stage 3 [L89.893]  Yes    Long term (current) use of immunosuppressive biologic [Z79.620]  Not Applicable    Edema of both lower extremities due to peripheral venous insufficiency [I87.2]  Yes    History of DVT (deep vein thrombosis) [Z86.718]  Not Applicable    History of kidney transplant [Z94.0]  Not Applicable    Paroxysmal atrial fibrillation [I48.0]  Yes    Pulmonary hypertension  [I27.20]  Yes    Mixed anxiety and depressive disorder [F41.8]  Yes      Resolved Hospital Problems   No resolved problems to display.          Hospital Course  Patient is a 80 y.o. female with multiple medical problems including immunosuppressed state due to previous renal transplant and chronic kidney disease who presented with fatigue, lethargy and fluid overload.  She was noted to have a pressure ulcer on her right heel.  Creatinine was above her baseline.  Urinalysis suggested possible UTI but urine culture was negative.  Her main problem appeared to be fluid overload related to decompensated congestive heart failure.  Required BiPAP and close monitoring in ICU.  She was diuresed aggressively, with close monitoring by cardiologist and nephrologist.  Swelling improved and she was able to be transferred to the floor.  Mental status improved back to baseline.  Cardiac medications were adjusted.  She is not tolerating Entresto due to hypovolemic tension.  She is now maintained on high-dose Lasix (80 mg p.o. twice daily) and is requiring midodrine 15 mg 3 times daily for blood pressure support.  Levothyroxine dose was increased for subtherapeutic T4.  Remeron was added for appetite stimulant.  Antirejection medications for history of kidney transplant were continued.    Patient was noted to have right upper extremity swelling at the site of AV fistula.  Venous duplex was negative for any clot.  Fistula was evaluated by vascular surgery and found to be patent.  Patient is refusing any consideration of further dialysis so nothing specific needs to be done with his fistula in the future.  Swelling in the right upper extremity has improved with diuresis.    Pt will need ongoing lymphedema wraps of lower extremities.  Chronic wounds of right foot have been treated with alginate dressings and foam dressings and offloading.    She has an oxygen requirement of 6 L/min.  This may be able to be weaned.    She is being  transferred back to [facility] for long term care.  She is DNR/DNI.        Procedures Performed         Consults:   Consults       Date and Time Order Name Status Description    3/6/2025 11:21 AM Inpatient Cardiology Consult Completed     3/4/2025  4:45 PM Inpatient Vascular Surgery Consult Completed     3/4/2025  2:35 PM Inpatient Nephrology Consult Completed     3/4/2025 12:25 PM Inpatient Nephrology Consult      3/3/2025 11:44 PM Inpatient Nephrology Consult              Pertinent Test Results:    Lab Results (most recent)       Procedure Component Value Units Date/Time    Magnesium [[id number]]  (Normal) Collected: 03/13/25 0202    Specimen: Blood from Arm, Left Updated: 03/13/25 0259     Magnesium 1.9 mg/dL     Comprehensive Metabolic Panel [[id number]]  (Abnormal) Collected: 03/13/25 0202    Specimen: Blood from Arm, Left Updated: 03/13/25 0259     Glucose 117 mg/dL      BUN 53 mg/dL      Creatinine 1.20 mg/dL      Sodium 136 mmol/L      Potassium 3.6 mmol/L      Chloride 96 mmol/L      CO2 33.1 mmol/L      Calcium 9.6 mg/dL      Total Protein 6.2 g/dL      Albumin 3.0 g/dL      ALT (SGPT) <5 U/L      AST (SGOT) 13 U/L      Alkaline Phosphatase 86 U/L      Total Bilirubin 0.4 mg/dL      Globulin 3.2 gm/dL      A/G Ratio 0.9 g/dL      BUN/Creatinine Ratio 44.2     Anion Gap 6.9 mmol/L      eGFR 46.4 mL/min/1.73     Narrative:      GFR Categories in Chronic Kidney Disease (CKD)      GFR Category          GFR (mL/min/1.73)    Interpretation  G1                     90 or greater         Normal or high (1)  G2                      60-89                Mild decrease (1)  G3a                   45-59                Mild to moderate decrease  G3b                   30-44                Moderate to severe decrease  G4                    15-29                Severe decrease  G5                    14 or less           Kidney failure          (1)In the absence of evidence of kidney disease, neither GFR category G1 or  G2 fulfill the criteria for CKD.    eGFR calculation 2021 CKD-EPI creatinine equation, which does not include race as a factor    Phosphorus [[id number]]  (Normal) Collected: 03/13/25 0202    Specimen: Blood from Arm, Left Updated: 03/13/25 0251     Phosphorus 3.8 mg/dL     CBC & Differential [[id number]]  (Abnormal) Collected: 03/13/25 0215    Specimen: Blood Updated: 03/13/25 0225    Narrative:      The following orders were created for panel order CBC & Differential.  Procedure                               Abnormality         Status                     ---------                               -----------         ------                     CBC Auto Differential[[id number]]        Abnormal            Final result                 Please view results for these tests on the individual orders.    CBC Auto Differential [[id number]]  (Abnormal) Collected: 03/13/25 0215    Specimen: Blood Updated: 03/13/25 0225     WBC 7.05 10*3/mm3      RBC 2.54 10*6/mm3      Hemoglobin 7.6 g/dL      Hematocrit 25.6 %      .8 fL      MCH 29.9 pg      MCHC 29.7 g/dL      RDW 17.8 %      RDW-SD 63.9 fl      MPV 9.5 fL      Platelets 155 10*3/mm3      Neutrophil % 58.1 %      Lymphocyte % 21.3 %      Monocyte % 12.8 %      Eosinophil % 6.5 %      Basophil % 1.0 %      Immature Grans % 0.3 %      Neutrophils, Absolute 4.10 10*3/mm3      Lymphocytes, Absolute 1.50 10*3/mm3      Monocytes, Absolute 0.90 10*3/mm3      Eosinophils, Absolute 0.46 10*3/mm3      Basophils, Absolute 0.07 10*3/mm3      Immature Grans, Absolute 0.02 10*3/mm3      nRBC 0.0 /100 WBC     CBC & Differential [[id number]]  (Abnormal) Collected: 03/12/25 0451    Specimen: Blood Updated: 03/12/25 0600    Narrative:      The following orders were created for panel order CBC & Differential.  Procedure                               Abnormality         Status                     ---------                               -----------         ------                     CBC Auto  Differential[[id number]]        Abnormal            Final result                 Please view results for these tests on the individual orders.    CBC Auto Differential [[id number]]  (Abnormal) Collected: 03/12/25 0451    Specimen: Blood Updated: 03/12/25 0600     WBC 6.14 10*3/mm3      RBC 2.65 10*6/mm3      Hemoglobin 7.7 g/dL      Hematocrit 26.9 %      .5 fL      MCH 29.1 pg      MCHC 28.6 g/dL      RDW 18.0 %      RDW-SD 65.8 fl      MPV 9.4 fL      Platelets 150 10*3/mm3      Neutrophil % 59.4 %      Lymphocyte % 19.5 %      Monocyte % 12.5 %      Eosinophil % 7.5 %      Basophil % 0.8 %      Immature Grans % 0.3 %      Neutrophils, Absolute 3.64 10*3/mm3      Lymphocytes, Absolute 1.20 10*3/mm3      Monocytes, Absolute 0.77 10*3/mm3      Eosinophils, Absolute 0.46 10*3/mm3      Basophils, Absolute 0.05 10*3/mm3      Immature Grans, Absolute 0.02 10*3/mm3      nRBC 0.0 /100 WBC     Magnesium [[id number]]  (Normal) Collected: 03/12/25 0451    Specimen: Blood Updated: 03/12/25 0552     Magnesium 2.0 mg/dL     Comprehensive Metabolic Panel [[id number]]  (Abnormal) Collected: 03/12/25 0451    Specimen: Blood Updated: 03/12/25 0552     Glucose 93 mg/dL      BUN 53 mg/dL      Creatinine 1.16 mg/dL      Sodium 136 mmol/L      Potassium 3.5 mmol/L      Chloride 96 mmol/L      CO2 33.0 mmol/L      Calcium 9.7 mg/dL      Total Protein 6.1 g/dL      Albumin 3.0 g/dL      ALT (SGPT) <5 U/L      AST (SGOT) 11 U/L      Alkaline Phosphatase 81 U/L      Total Bilirubin 0.4 mg/dL      Globulin 3.1 gm/dL      A/G Ratio 1.0 g/dL      BUN/Creatinine Ratio 45.7     Anion Gap 7.0 mmol/L      eGFR 48.4 mL/min/1.73     Narrative:      GFR Categories in Chronic Kidney Disease (CKD)      GFR Category          GFR (mL/min/1.73)    Interpretation  G1                     90 or greater         Normal or high (1)  G2                      60-89                Mild decrease (1)  G3a                   45-59                Mild to moderate  decrease  G3b                   30-44                Moderate to severe decrease  G4                    15-29                Severe decrease  G5                    14 or less           Kidney failure          (1)In the absence of evidence of kidney disease, neither GFR category G1 or G2 fulfill the criteria for CKD.    eGFR calculation 2021 CKD-EPI creatinine equation, which does not include race as a factor    Phosphorus [[id number]]  (Normal) Collected: 03/12/25 0451    Specimen: Blood Updated: 03/12/25 0549     Phosphorus 4.1 mg/dL     POC Glucose Once [[id number]]  (Normal) Collected: 03/10/25 0828    Specimen: Blood Updated: 03/10/25 0829     Glucose 89 mg/dL      Comment: Serial Number: [id number]:  [id number]       Tacrolimus Level [[id number]]  (Abnormal) Collected: 03/05/25 0744    Specimen: Blood Updated: 03/08/25 1809     Tacrolimus 3.9 ng/mL      Comment: Target steady state trough concentration for  Tacrolimus varies based on type of organ transplant  immunosuppressive protocol and other patient specific  factors.  Tacrolimus trough concentrations should be  interpreted in conjunction with clinical assessments  of rejection and tolerability.  Values obtained with  different assay methods cannot be used interchangeably  due to differences in assay methods and cross-reactivty  with metabolites, nor should correction factors be  applied.  Therefore, consistent use of one assay for  individual patients is recommended.  Detection Limit = 0.5 ng/mL  Performed by LC-MS/MS technology              **Please note reference interval change**       Narrative:      Test(s) 700964-Tacrolimus (), Blood  was developed and its performance characteristics determined  by [facility]. It has not been cleared or approved by the Food  and Drug Administration.  Performed at:  [address]  [id number]  : Star Flores MD, Phone:  [phone]    Blood Culture - Blood,  Arm, Left [[id number]]  (Normal) Collected: 03/03/25 1716    Specimen: Blood from Arm, Left Updated: 03/08/25 1731     Blood Culture No growth at 5 days    Blood Culture - Blood, Arm, Left [[id number]]  (Normal) Collected: 03/03/25 1716    Specimen: Blood from Arm, Left Updated: 03/08/25 1731     Blood Culture No growth at 5 days    Narrative:      Less than seven (7) mL's of blood was collected.  Insufficient quantity may yield false negative results.    Tacrolimus Level [[id number]] Collected: 03/04/25 0124    Specimen: Blood from Arm, Right Updated: 03/08/25 0609     Tacrolimus 5.2 ng/mL      Comment: Target steady state trough concentration for  Tacrolimus varies based on type of organ transplant  immunosuppressive protocol and other patient specific  factors.  Tacrolimus trough concentrations should be  interpreted in conjunction with clinical assessments  of rejection and tolerability.  Values obtained with  different assay methods cannot be used interchangeably  due to differences in assay methods and cross-reactivty  with metabolites, nor should correction factors be  applied.  Therefore, consistent use of one assay for  individual patients is recommended.  Detection Limit = 0.5 ng/mL  Performed by LC-MS/MS technology              **Please note reference interval change**       Narrative:      Test(s) 700964-Tacrolimus (), Blood  was developed and its performance characteristics determined  by [facility]. It has not been cleared or approved by the Food  and Drug Administration.  Performed at:  [address]  [id number]  : Star Flores MD, Phone:  [phone]    Urine Culture - Urine, Straight Cath [[id number]] Collected: 03/03/25 1733    Specimen: Urine from Straight Cath Updated: 03/05/25 1049     Urine Culture <25,000 CFU/mL Normal Urogenital Radha    Narrative:      Colonization of the urinary tract without infection is common. Treatment is discouraged  "unless the patient is symptomatic, pregnant, or undergoing an invasive urologic procedure.    CANDIDA AURIS PCR - Swab, Axilla Right, Axilla Left and Groin [[id number]]  (Normal) Collected: 03/04/25 0347    Specimen: Swab from Axilla Right, Axilla Left and Groin Updated: 03/05/25 0815     CANDIDA AURIS PCR Not Detected    MRSA Screen, PCR (Inpatient) - Swab, Nares [[id number]]  (Normal) Collected: 03/04/25 0951    Specimen: Swab from Nares Updated: 03/04/25 1111     MRSA PCR No MRSA Detected    Narrative:      The negative predictive value of this diagnostic test is high and should only be used to consider de-escalating anti-MRSA therapy. A positive result may indicate colonization with MRSA and must be correlated clinically.    D-dimer, Quantitative [[id number]]  (Abnormal) Collected: 03/04/25 0549    Specimen: Blood Updated: 03/04/25 0714     D-Dimer, Quantitative >20.00 MCGFEU/mL     Narrative:      According to the assay 's published package insert, a normal (<0.50 MCGFEU/mL) D-dimer result in conjunction with a non-high clinical probability assessment, excludes deep vein thrombosis (DVT) and pulmonary embolism (PE) with high sensitivity.    D-dimer values increase with age and this can make VTE exclusion of an older population difficult. To address this, the [employer], based on best available evidence and recent guidelines, recommends that clinicians use age-adjusted D-dimer thresholds in patients greater than 50 years of age with: a) a low probability of PE who do not meet all Pulmonary Embolism Rule Out Criteria, or b) in those with intermediate probability of PE.   The formula for an age-adjusted D-dimer cut-off is \"age/100\".  For example, a 60 year old patient would have an age-adjusted cut-off of 0.60 MCGFEU/mL and an 80 year old 0.80 MCGFEU/mL.    Lipid Panel [[id number]]  (Abnormal) Collected: 03/04/25 0549    Specimen: Blood Updated: 03/04/25 0625     Total Cholesterol 82 " mg/dL      Triglycerides 104 mg/dL      HDL Cholesterol 20 mg/dL      LDL Cholesterol  42 mg/dL      VLDL Cholesterol 20 mg/dL      LDL/HDL Ratio 2.06    Narrative:      Cholesterol Reference Ranges  (U.S. Department of Health and Human Services ATP III Classifications)    Desirable          <200 mg/dL  Borderline High    200-239 mg/dL  High Risk          >240 mg/dL      Triglyceride Reference Ranges  (U.S. Department of Health and Human Services ATP III Classifications)    Normal           <150 mg/dL  Borderline High  150-199 mg/dL  High             200-499 mg/dL  Very High        >500 mg/dL    HDL Reference Ranges  (U.S. Department of Health and Human Services ATP III Classifications)    Low     <40 mg/dl (major risk factor for CHD)  High    >60 mg/dl ('negative' risk factor for CHD)        LDL Reference Ranges  (U.S. Department of Health and Human Services ATP III Classifications)    Optimal          <100 mg/dL  Near Optimal     100-129 mg/dL  Borderline High  130-159 mg/dL  High             160-189 mg/dL  Very High        >189 mg/dL    LDL is calculated using the NIH LDL-C calculation.      Blood Gas, Venous - [[id number]]  (Abnormal) Collected: 03/04/25 0538    Specimen: Venous Blood Updated: 03/04/25 0553     Site Left Radial     pH, Venous 7.232 pH Units      pCO2, Venous 78.6 mm Hg      pO2, Venous 21.2 mm Hg      HCO3, Venous 33.1 mmol/L      Base Excess, Venous 3.7 mmol/L      Comment: Serial Number: [id number]:  [id number]        O2 Saturation, Venous 24.7 %      CO2 Content 35.5 mmol/L      Barometric Pressure for Blood Gas --     Comment: N/A        Modality Vapotherm     FIO2 60 %      Notified Who TONYA de la garza    Blood Gas, Venous - [[id number]]  (Abnormal) Collected: 03/04/25 0412    Specimen: Venous Blood Updated: 03/04/25 0424     Site Left Brachial     pH, Venous 7.269 pH Units      pCO2, Venous 73.8 mm Hg      pO2, Venous 27.6 mm Hg      HCO3, Venous 33.8 mmol/L      Base Excess, Venous 5.3 mmol/L       Comment: Serial Number: [id number]:  [id number]        O2 Saturation, Venous 40.7 %      CO2 Content 36.0 mmol/L      Barometric Pressure for Blood Gas --     Comment: N/A        Modality HFNC     FIO2 60 %     Basic Metabolic Panel [[id number]]  (Abnormal) Collected: 03/04/25 0307    Specimen: Blood from Arm, Left Updated: 03/04/25 0340     Glucose 128 mg/dL      BUN 48 mg/dL      Creatinine 1.87 mg/dL      Sodium 143 mmol/L      Potassium 4.8 mmol/L      Chloride 104 mmol/L      CO2 29.9 mmol/L      Calcium 9.2 mg/dL      BUN/Creatinine Ratio 25.7     Anion Gap 9.1 mmol/L      eGFR 27.3 mL/min/1.73     Narrative:      GFR Categories in Chronic Kidney Disease (CKD)      GFR Category          GFR (mL/min/1.73)    Interpretation  G1                     90 or greater         Normal or high (1)  G2                      60-89                Mild decrease (1)  G3a                   45-59                Mild to moderate decrease  G3b                   30-44                Moderate to severe decrease  G4                    15-29                Severe decrease  G5                    14 or less           Kidney failure          (1)In the absence of evidence of kidney disease, neither GFR category G1 or G2 fulfill the criteria for CKD.    eGFR calculation 2021 CKD-EPI creatinine equation, which does not include race as a factor    Hemoglobin A1c [[id number]]  (Normal) Collected: 03/04/25 0124    Specimen: Blood from Arm, Left Updated: 03/04/25 0241     Hemoglobin A1C 5.06 %     POCT Electrolytes +HGB +HCT [[id number]]  (Abnormal) Collected: 03/04/25 0231    Specimen: Arterial Blood Updated: 03/04/25 0234     Sodium 138 mmol/L      POC Potassium 4.8 mmol/L      Ionized Calcium 1.39 mmol/L      Comment: Serial Number: [id number]:  [id number]        Glucose 141 mg/dL      Hematocrit 29 %      Hemoglobin 10.0 g/dL     POC Lactate [[id number]]  (Normal) Collected: 03/04/25 0231    Specimen: Arterial Blood Updated: 03/04/25 0234      Lactate 1.0 mmol/L      Comment: Serial Number: [id number]:  [id number]       POC Glucose Once [[id number]]  (Abnormal) Collected: 03/04/25 0231    Specimen: Arterial Blood Updated: 03/04/25 0234     Glucose 141 mg/dL      Comment: Serial Number: [id number]:  [id number]       Blood Gas, Arterial - [[id number]]  (Abnormal) Collected: 03/04/25 0231    Specimen: Arterial Blood Updated: 03/04/25 0234     Site Left Radial     Orion's Test Positive     pH, Arterial 7.291 pH units      pCO2, Arterial 69.0 mm Hg      pO2, Arterial 82.0 mm Hg      HCO3, Arterial 33.3 mmol/L      Base Excess, Arterial 5.2 mmol/L      Comment: Serial Number: [id number]:  [id number]        O2 Saturation, Arterial 94.0 %      Barometric Pressure for Blood Gas --     Comment: N/A        Modality Cannula     FIO2 40 %      Hemodilution No     PO2/FIO2 205    POC Creatinine [[id number]]  (Abnormal) Collected: 03/04/25 0231    Specimen: Arterial Blood Updated: 03/04/25 0234     Creatinine 2.07 mg/dL      Comment: Serial Number: [id number]:  [id number]        eGFR 24.1 mL/min/1.73     Cortisol [[id number]] Collected: 03/04/25 0124    Specimen: Blood Updated: 03/04/25 0210     Cortisol 1.83 mcg/dL     Narrative:      Cortisol Reference Ranges:    Cortisol 6AM - 10AM Range: 6.02-18.40 mcg/dl  Cortisol 4PM - 8PM Range: 2.68-10.50 mcg/dl      Results may be falsely increased if patient taking Biotin.      Ammonia [[id number]]  (Normal) Collected: 03/04/25 0124    Specimen: Blood from Arm, Left Updated: 03/04/25 0152     Ammonia 22 umol/L     TSH [[id number]]  (Abnormal) Collected: 03/03/25 1839    Specimen: Blood from Arm, Left Updated: 03/03/25 2050     TSH 33.000 uIU/mL     T4, Free [[id number]]  (Abnormal) Collected: 03/03/25 1839    Specimen: Blood from Arm, Left Updated: 03/03/25 2050     Free T4 0.30 ng/dL     High Sensitivity Troponin T 1Hr [[id number]]  (Abnormal) Collected: 03/03/25 1839    Specimen: Blood from Arm, Left Updated: 03/03/25 1907     HS  Troponin T 49 ng/L      Troponin T Numeric Delta -5 ng/L      Troponin T % Delta -9    Narrative:      High Sensitive Troponin T Reference Range:  <14.0 ng/L- Negative Female for AMI  <22.0 ng/L- Negative Male for AMI  >=14 - Abnormal Female indicating possible myocardial injury.  >=22 - Abnormal Male indicating possible myocardial injury.   Clinicians would have to utilize clinical acumen, EKG, Troponin, and serial changes to determine if it is an Acute Myocardial Infarction or myocardial injury due to an underlying chronic condition.         Extra Tubes [[id number]] Collected: 03/03/25 1839    Specimen: Blood from Arm, Left Updated: 03/03/25 1845    Narrative:      The following orders were created for panel order Extra Tubes.  Procedure                               Abnormality         Status                     ---------                               -----------         ------                     Gold Top - SST[[id number]]                                   Final result                 Please view results for these tests on the individual orders.    Gold Top - SST [[id number]] Collected: 03/03/25 1839    Specimen: Blood from Arm, Left Updated: 03/03/25 1845     Extra Tube Hold for add-ons.     Comment: Auto resulted.       Urinalysis, Microscopic Only - Straight Cath [[id number]]  (Abnormal) Collected: 03/03/25 1733    Specimen: Urine from Straight Cath Updated: 03/03/25 1819     RBC, UA 0-2 /HPF      WBC, UA Too Numerous to Count /HPF      Bacteria, UA 4+ /HPF      Squamous Epithelial Cells, UA 0-2 /HPF      Hyaline Casts, UA 13-20 /LPF      Methodology Manual Light Microscopy    Urinalysis With Microscopic If Indicated (No Culture) - Straight Cath [[id number]]  (Abnormal) Collected: 03/03/25 1733    Specimen: Urine from Straight Cath Updated: 03/03/25 1741     Color, UA Dark Yellow     Appearance, UA Cloudy     pH, UA 6.0     Specific Gravity, UA 1.017     Glucose, UA Negative     Ketones, UA Trace     Bilirubin, UA  Negative     Blood, UA Moderate (2+)     Protein, UA 30 mg/dL (1+)     Leuk Esterase, UA Large (3+)     Nitrite, UA Negative     Urobilinogen, UA 1.0 E.U./dL    High Sensitivity Troponin T [[id number]]  (Abnormal) Collected: 03/03/25 1712    Specimen: Blood Updated: 03/03/25 1739     HS Troponin T 54 ng/L     Narrative:      High Sensitive Troponin T Reference Range:  <14.0 ng/L- Negative Female for AMI  <22.0 ng/L- Negative Male for AMI  >=14 - Abnormal Female indicating possible myocardial injury.  >=22 - Abnormal Male indicating possible myocardial injury.   Clinicians would have to utilize clinical acumen, EKG, Troponin, and serial changes to determine if it is an Acute Myocardial Infarction or myocardial injury due to an underlying chronic condition.         BNP [[id number]]  (Abnormal) Collected: 03/03/25 1712    Specimen: Blood Updated: 03/03/25 1736     proBNP 17,411.0 pg/mL     Narrative:      This assay is used as an aid in the diagnosis of individuals suspected of having heart failure. It can be used as an aid in the diagnosis of acute decompensated heart failure (ADHF) in patients presenting with signs and symptoms of ADHF to the emergency department (ED). In addition, NT-proBNP of <300 pg/mL indicates ADHF is not likely.    Age Range Result Interpretation  NT-proBNP Concentration (pg/mL:      <50             Positive            >450                   Gray                 300-450                    Negative             <300    50-75           Positive            >900                  Gray                300-900                  Negative            <300      >75             Positive            >1800                  Gray                300-1800                  Negative            <300    [place] Draw [[id number]] Collected: 03/03/25 1712    Specimen: Blood Updated: 03/03/25 1715    Narrative:      The following orders were created for panel order [place] Draw.  Procedure                                Abnormality         Status                     ---------                               -----------         ------                     Green Top (Gel)[[id number]]                                  Final result               Lavender Top[[id number]]                                     Final result               Gold Top - SST[[id number]]                                   Final result               Light Blue Top[[id number]]                                   Final result                 Please view results for these tests on the individual orders.    Green Top (Gel) [[id number]] Collected: 03/03/25 1712    Specimen: Blood Updated: 03/03/25 1715     Extra Tube Hold for add-ons.     Comment: Auto resulted.       Lavender Top [[id number]] Collected: 03/03/25 1712    Specimen: Blood Updated: 03/03/25 1715     Extra Tube hold for add-on     Comment: Auto resulted       Gold Top - SST [[id number]] Collected: 03/03/25 1712    Specimen: Blood Updated: 03/03/25 1715     Extra Tube Hold for add-ons.     Comment: Auto resulted.       Light Blue Top [[id number]] Collected: 03/03/25 1712    Specimen: Blood Updated: 03/03/25 1715     Extra Tube Hold for add-ons.     Comment: Auto resulted       POC Lactate [[id number]]  (Normal) Collected: 03/03/25 1709    Specimen: Blood Updated: 03/03/25 1711     Lactate 0.6 mmol/L      Comment: Serial Number: [id number]:  [id number]                Results for orders placed during the hospital encounter of 11/29/24    Adult Transthoracic Echo Complete W/ Cont if Necessary Per Protocol    Interpretation Summary    Left ventricular systolic function is normal. Calculated left ventricular EF = 70% Left ventricular ejection fraction appears to be 66 - 70%.    Left ventricular wall thickness is consistent with moderate to severe concentric hypertrophy.    Left ventricular diastolic function is consistent with (grade III w/high LAP) reversible restrictive pattern.    Moderately reduced right ventricular  systolic function noted.    The right ventricular cavity is severely dilated.    The left atrial cavity is severely dilated.    The right atrial cavity is severely  dilated.    There is mild calcification of the aortic valve mainly affecting the left coronary and right coronary cusp(s).    Severe tricuspid valve regurgitation is present.    Estimated right ventricular systolic pressure from tricuspid regurgitation is markedly elevated (>55 mmHg).    Severe pulmonary hypertension is present.    There is a moderate sized left pleural effusion.              Condition on Discharge:  Improved.  Chronically ill,  but stable    Vital Signs  Temp:  [97.4 °F (36.3 °C)-98.4 °F (36.9 °C)] 98.4 °F (36.9 °C)  Heart Rate:  [84-95] 84  Resp:  [12-20] 12  BP: ()/(45-98) 103/56    Physical Exam:     General Appearance:    Alert, cooperative, in no acute distress   Head:    Normocephalic, without obvious abnormality, atraumatic   Eyes:            Lids and lashes normal, conjunctivae and sclerae normal, no   icterus, no pallor, corneas clear, PERRLA   Ears:    Ears appear intact with no abnormalities noted   Throat:   No oral lesions, no thrush, oral mucosa moist   Neck:   No adenopathy, supple, trachea midline, no thyromegaly, no   carotid bruit, no JVD   Lungs:     Clear to auscultation,respirations regular, even and                  unlabored    Heart:    Regular rhythm and normal rate, normal S1 and S2, no            murmur, no gallop, no rub, no click   Chest Wall:    No abnormalities observed   Abdomen:     Normal bowel sounds, no masses, no organomegaly, soft        non-tender, non-distended, no guarding, no rebound                tenderness   Extremities:   AV fistula right upper extremity with thrill   Pulses:   Pulses palpable and equal bilaterally   Skin:   No bleeding, bruising or rash   Lymph nodes:   No palpable adenopathy   Neurologic:   Cranial nerves 2 - 12 grossly intact, sensation intact, DTR       present and  equal bilaterally       Discharge Disposition  Skilled Nursing Facility (DC - External)    Discharge Medications     Discharge Medications        New Medications        Instructions Start Date   ipratropium-albuterol 0.5-2.5 mg/3 ml nebulizer  Commonly known as: DUO-NEB   3 mL, Nebulization, Every 6 Hours PRN      metoprolol succinate XL 25 MG 24 hr tablet  Commonly known as: TOPROL-XL   12.5 mg, Oral, Every 12 Hours Scheduled      nitroglycerin 0.4 MG SL tablet  Commonly known as: NITROSTAT   0.4 mg, Sublingual, Every 5 Minutes PRN, Take no more than 3 doses in 15 minutes.      ondansetron ODT 4 MG disintegrating tablet  Commonly known as: ZOFRAN-ODT   4 mg, Oral, Every 6 Hours PRN      potassium chloride 20 MEQ CR tablet  Commonly known as: KLOR-CON M20   20 mEq, Oral, Daily   Start Date: March 14, 2025     sodium chloride 0.65 % nasal spray   2 sprays, Nasal, As Needed             Changes to Medications        Instructions Start Date   furosemide 80 MG tablet  Commonly known as: LASIX  What changed:   medication strength  how much to take   80 mg, Oral, 2 Times Daily      hydrOXYzine 25 MG tablet  Commonly known as: ATARAX  What changed:   when to take this  reasons to take this   25 mg, Oral, 3 Times Daily PRN      levothyroxine 137 MCG tablet  Commonly known as: SYNTHROID, LEVOTHROID  What changed:   medication strength  how much to take  when to take this   137 mcg, Oral, Every Early Morning   Start Date: March 14, 2025     midodrine 5 MG tablet  Commonly known as: PROAMATINE  What changed:   when to take this  Another medication with the same name was removed. Continue taking this medication, and follow the directions you see here.   15 mg, Oral, Every 8 Hours Scheduled      Tylenol 325 MG tablet  Generic drug: acetaminophen  What changed: Another medication with the same name was added. Make sure you understand how and when to take each.   2 tablets, Every 4 Hours PRN      acetaminophen 325 MG  tablet  Commonly known as: TYLENOL  What changed: You were already taking a medication with the same name, and this prescription was added. Make sure you understand how and when to take each.   650 mg, Oral, Every 4 Hours PRN             Continue These Medications        Instructions Start Date   albuterol sulfate  (90 Base) MCG/ACT inhaler  Commonly known as: PROVENTIL HFA;VENTOLIN HFA;PROAIR HFA   2 puffs, Every 6 Hours PRN      DULoxetine HCl 40 MG capsule delayed-release particles   1 capsule, Daily      Eliquis 5 MG tablet tablet  Generic drug: apixaban   Take 1 tablet by mouth Every 12 (Twelve) Hours.      HYDROcodone-acetaminophen  MG per tablet  Commonly known as: NORCO   1 tablet, Oral, Every 6 Hours      MiraLax 17 GM/SCOOP powder  Generic drug: polyethylene glycol   17 g, Daily      mirtazapine 15 MG tablet  Commonly known as: REMERON   15 mg, Nightly      sennosides-docusate 8.6-50 MG per tablet  Commonly known as: PERICOLACE   1 tablet, 2 Times Daily      tacrolimus 0.5 MG capsule  Commonly known as: PROGRAF   0.5 mg, Oral, 2 Times Daily      vitamin B-12 1000 MCG tablet  Commonly known as: CYANOCOBALAMIN   1,000 mcg, Daily      vitamin D 1.25 MG (29905 UT) capsule capsule  Commonly known as: ERGOCALCIFEROL   50,000 Units, Weekly             Stop These Medications      amoxicillin-clavulanate 875-125 MG per tablet  Commonly known as: AUGMENTIN     Entresto 24-26 MG tablet  Generic drug: sacubitril-valsartan     ferrous sulfate 325 (65 FE) MG tablet     LORazepam 0.5 MG tablet  Commonly known as: ATIVAN     metoprolol tartrate 25 MG tablet  Commonly known as: LOPRESSOR     predniSONE 5 MG tablet  Commonly known as: DELTASONE     simvastatin 20 MG tablet  Commonly known as: ZOCOR              Discharge Diet:   Diet Instructions       Diet: Cardiac Diets; Healthy Heart (2-3 Na+); Regular (IDDSI 7); Thin (IDDSI 0)      Discharge Diet: Cardiac Diets    Cardiac Diet: Healthy Heart (2-3 Na+)     Texture: Regular (IDDSI 7)    Fluid Consistency: Thin (IDDSI 0)            Activity at Discharge:   Activity Instructions       Activity as Tolerated              Follow-up Appointments  Future Appointments   Date Time Provider Department Center   4/8/2025 12:00 PM [facility]   4/8/2025  1:00 PM Paulino Alva II, MD [facility]     Additional Instructions for the Follow-ups that You Need to Schedule       Discharge Follow-up with PCP   As directed       Currently Documented PCP:    Kvng Guillen MD    PCP Phone Number:    [phone]     Follow Up Details: Dr. Guillen will resume care at facility.        Discharge Follow-up with Specified Provider: Dr. Falcon; 2 Weeks   As directed      To: Dr. Falcon   Follow Up: 2 Weeks                Test Results Pending at Discharge  Pending Results       None             Juanis Lowe MD  03/13/25  11:10 EDT    Time: Discharge 45 min

## 2025-03-13 NOTE — CONSULTS
"Nutrition Services    Patient Name: Jaja Carcamo  YOB: 1945  MRN: [id number]  Admission date: 3/3/2025    NUTRITION SCREENING      Encounter Information: Pt evaluated due to LOS. Pt eating well and tolerating current diet, which is providing sufficient kcal/PRO to meet needs with current intake. Pt actually preparing for D/C later today - no additional nutrition interventions indicated presently.        PO Diet: Diet: Cardiac; Healthy Heart (2-3 Na+); Fluid Consistency: Thin (IDDSI 0)   PO Supplements: None ordered   PO Intake:  Averaging at least 75%        Labs (reviewed below): Reviewed and C/w clinical course        GI Function:  BM 3/12         Skin: No open areas/PI at this time       Weight Hx Review: Estimated body mass index is 26.79 kg/m² as calculated from the following:    Height as of this encounter: 167.6 cm (66\").    Weight as of this encounter: 75.3 kg (166 lb).         Nutrition Intervention: Continue current diet as tolerated, which is meeting needs with current intake.       Results from last 7 days   Lab Units 03/13/25  0202 03/12/25 0451 03/11/25  0310   SODIUM mmol/L 136 136 135*   POTASSIUM mmol/L 3.6 3.5 4.0   CHLORIDE mmol/L 96* 96* 96*   CO2 mmol/L 33.1* 33.0* 32.1*   BUN mg/dL 53* 53* 48*   CREATININE mg/dL 1.20* 1.16* 1.14*   CALCIUM mg/dL 9.6 9.7 9.5   BILIRUBIN mg/dL 0.4 0.4 0.4   ALK PHOS U/L 86 81 80   ALT (SGPT) U/L <5 <5 <5   AST (SGOT) U/L 13 11 13   GLUCOSE mg/dL 117* 93 106*     Results from last 7 days   Lab Units 03/13/25  0215 03/13/25  0202 03/12/25 0451 03/11/25  0310   MAGNESIUM mg/dL  --  1.9 2.0 2.0   PHOSPHORUS mg/dL  --  3.8 4.1 3.9   HEMOGLOBIN g/dL 7.6*  --  7.7* 7.7*   HEMATOCRIT % 25.6*  --  26.9* 26.6*     COVID19   Date Value Ref Range Status   03/01/2025 Not Detected Not Detected - Ref. Range Final     Lab Results   Component Value Date    HGBA1C 5.06 03/04/2025       RD to follow up per protocol.    Electronically signed by:  Vivi Delatorre, " RD  03/13/25 12:24 EDT

## 2025-03-13 NOTE — CASE MANAGEMENT/SOCIAL WORK
"Physicians Statement of Medical Necessity for  Ambulance Transportation    GENERAL INFORMATION     Name: Jaja Carcamo  YOB: 1945  Medicare #: [id number]  Transport Date: 3/13/25 (Valid for round trips this date, or for scheduled repetitive trips for 60 days from the date signed below.)  Origin: [facility]  Destination: [facility].  [address]. [place], [place]  Is the Patient's stay covered under Medicare Part A (PPS/DRG?)Yes  Closest appropriate facility? Yes  If this a hosp-hosp transfer? No  Is this a hospice patient? No    MEDICAL NECESSITY QUESTIONAIRE    Ambulance Transportation is medically necessary only if other means of transportation are contraindicated or would be potentially harmful to the patient.  To meet this requirement, the patient must be either \"bed confined\" or suffer from a condition such that transport by means other than an ambulance is contraindicated by the patient's condition.  The following questions must be answered by the healthcare professional signing below for this form to be valid:     1) Describe the MEDICAL CONDITION (physical and/or mental) of this patient AT THE TIME OF AMBULANCE TRANSPORT that requires the patient to be transported in an ambulance, and why transport by other means is contraindicated by the patient's condition:   Past Medical History:   Diagnosis Date    Allergic rhinitis 04/14/2015    Asthma 08/10/2017    Atrial flutter 05/11/2017    Chronic diarrhea 04/15/2019    Chronic hypoxemic respiratory failure 01/18/2024    Chronic pain 02/03/2015    COPD (chronic obstructive pulmonary disease)     COVID-19 virus detected 08/11/2022    Cytokine release syndrome, grade 1 08/16/2022    Edema of both lower extremities due to peripheral venous insufficiency 03/12/2021    ESRD on hemodialysis 05/22/2013    Essential (primary) hypertension 03/03/2022    Fracture of ulnar styloid 05/19/2022    Fracture of unspecified carpal bone, right wrist, subsequent " encounter for fracture with routine healing 03/03/2022    Gastroesophageal reflux disease 11/12/2020    Gout 08/10/2017    Hearing loss 08/10/2017    History of appendectomy     History of DVT (deep vein thrombosis) 11/12/2020    History of kidney transplant 06/30/2017    History of lobectomy of lung 01/18/2024 03/08/2016:  RIGHT Lower Lobe Mass--> Right Video-assisted thoracoscopy with a moderate-to-large wedge resection of the RLL (by Dr. Haris Mcconnell @ [facility])--> Poorly differentiated carcinoma of the RLL.      History of repair of hip joint 05/21/2013    History of suicide attempt 05/20/2024    Hyperlipidemia 08/11/2014    Hypothyroidism, unspecified 03/03/2022    Infection due to extended spectrum beta lactamase (ESBL) producing bacteria 03/11/2016    No A2K system hx. +ESBL E coli urine on 3/11/16.      Irritable bowel syndrome 04/15/2019    Long term (current) use of immunosuppressive biologic 04/28/2021    Long term current use of anticoagulant therapy 01/18/2024    Malignant neoplasm of lung 08/10/2017    Menopausal flushing 08/30/2021    Mitral valve regurgitation 07/06/2015    Mixed anxiety and depressive disorder 04/30/2015    Non-small cell lung cancer 08/10/2017    Nonobstructive atherosclerosis of coronary artery 06/02/2019 08/12/2022: CATH: Prashant: NSTEMI assoc with Covid-19: LM:-nl;  LAD: diffuse, Ca++; 30%; CIRC: Dominant. Normal; RCA: small; 50% diffuse, proximal and mid-segment.      NSTEMI (non-ST elevated myocardial infarction) 08/11/2022    Obsessive-compulsive disorder 04/15/2019    Osteoarthritis 05/20/2024    Paroxysmal atrial fibrillation 05/11/2017    Paroxysmal supraventricular tachycardia 05/11/2017    Peripheral neuropathy 08/11/2014    Personal history of peptic ulcer disease 11/12/2020    Postoperative anemia due to acute blood loss 05/22/2013    Right wrist fracture 03/01/2022    Seasonal allergies 08/10/2017    Secondary hyperparathyroidism of renal origin  "09/14/2015    Tricuspid valve regurgitation 03/15/2017    Ulcer of lower extremity 08/30/2021    Unspecified acute appendicitis 03/03/2022    Valvular heart disease 05/20/2024    Wegener's granulomatosis with renal involvement 03/03/2022      Past Surgical History:   Procedure Laterality Date    APPENDECTOMY      ARTERIOVENOUS FISTULA/SHUNT SURGERY Right 12/3/2024    Procedure: FISTULOGRAM  and angioplasty RIGHT ARM;  Surgeon: Paulino Alva II, MD;  Location: [facility] HYBRID OR;  Service: Vascular;  Laterality: Right;    BREAST SURGERY Left     cysts rmeoved    CARDIAC CATHETERIZATION Right 08/12/2022    Procedure: Left Heart Cath and coronary angiogram;  Surgeon: Jak Gavin MD;  Location: [facility] CATH INVASIVE LOCATION;  Service: Cardiology;  Laterality: Right;    CLOSED REDUCTION WRIST FRACTURE Right 03/01/2022    Procedure: WRIST CLOSED REDUCTION;  Surgeon: Gaudecnio Townsend MD;  Location: [facility] MAIN OR;  Service: Orthopedics;  Laterality: Right;    CYSTOSCOPY      HIP ARTHROPLASTY      HYSTERECTOMY      LUNG LOBECTOMY Right     TRANSPLANTATION RENAL        2) Is this patient \"bed confined\" as defined below?Yes   To be \"bed confined\" the patient must satisfy all three of the following criteria:  (1) unable to get up from bed without assistance; AND (2) unable to ambulate;  AND (3) unable to sit in a chair or wheelchair.  3) Can this patient safely be transported by car or wheelchair van (I.e., may safely sit during transport, without an attendant or monitoring?)No   4. In addition to completing questions 1-3 above, please check any of the following conditions that apply*:          *Note: supporting documentation for any boxes checked must be maintained in the patient's medical records Medical attendant required, Requires oxygen - unable to self administer, and Unable to tolerate seated position for time needed to transport      SIGNATURE OF PHYSICIAN OR OTHER AUTHORIZED HEALTHCARE PROFESSIONAL    I certify that the " above information is true and correct based on my evaluation of this patient, and represent that the patient requires transport by ambulance and that other forms of transport are contraindicated.  I understand that this information will be used by the Centers for Medicare and Medicaid Services (CMS) to support the determiniation of medical necessity for ambulance services, and I represent that I have personal knowledge of the patient's condition at the time of transport.    DS   If this box is checked, I also certify that the patient is physically or mentally incapable of signing the ambulance service's claim form and that the institution with which I am affiliated has furnished care, services or assistance to the patient.  My signature below is made on behalf of the patient pursuant to 42 .36(b)(4). In accordance with 42 .37, the specific reason(s) that the patient is physically or mentally incapable of signing the claim for is as follows:     Signature of Physician or Healthcare Professional   Claire Castro RN Date/Time:   3/13/25     (For Scheduled repetitive transport, this form is not valid for transports performed more than 60 days after this date).                                                                                                                                            --------------------------------------------------------------------------------------------  Printed Name and Credentials of Physician or Authorized Healthcare Professional     *Form must be signed by patient's attending physician for scheduled, repetitive transports,.  For non-repetitive ambulance transports, if unable to obtain the signature of the attending physician, any of the following may sign (please select below):     Physician  Clinical Nurse Specialist x Registered Nurse     Physician Assistant  Discharge Planner  Licensed Practical Nurse     Nurse Practitioner x

## 2025-03-13 NOTE — PROGRESS NOTES
"RENAL/KCC:     LOS: 10 days    Patient Care Team:  Kvng Guillen MD as PCP - General (Family Medicine)  Jak Gavin MD as Cardiologist (Cardiology)    Chief Complaint:  TONYA/CKD, Kidney Transplant    Subjective     Interval History:   Chart reviewed  Feeling well    Objective     Vital Sign Min/Max for last 24 hours  Temp  Min: 97.4 °F (36.3 °C)  Max: 98.4 °F (36.9 °C)   BP  Min: 84/45  Max: 134/98   Pulse  Min: 84  Max: 95   Resp  Min: 12  Max: 20   SpO2  Min: 90 %  Max: 96 %   Flow (L/min) (Oxygen Therapy)  Min: 6  Max: 6   No data recorded     Flowsheet Rows      Flowsheet Row First Filed Value   Admission Height 167.7 cm (66.02\") Documented at 03/03/2025 1641   Admission Weight 81.6 kg (179 lb 14.3 oz) Documented at 03/03/2025 1641            No intake/output data recorded.  I/O last 3 completed shifts:  In: 1200 [P.O.:1200]  Out: 1050 [Urine:1050]    Physical Exam:  GEN: Awake, NAD  ENT: PERRL, EOMI, MMM  NECK: Supple, no JVD  CHEST: CTAB, no W/R/C  CV: RRR, no M/G/R, +edema  ABD: Soft, NT, +BS  SKIN: Warm and Dry  NEURO: CN's intact      WBC WBC   Date Value Ref Range Status   03/13/2025 7.05 3.40 - 10.80 10*3/mm3 Final   03/12/2025 6.14 3.40 - 10.80 10*3/mm3 Final   03/11/2025 6.90 3.40 - 10.80 10*3/mm3 Final      HGB Hemoglobin   Date Value Ref Range Status   03/13/2025 7.6 (L) 12.0 - 15.9 g/dL Final   03/12/2025 7.7 (L) 12.0 - 15.9 g/dL Final   03/11/2025 7.7 (L) 12.0 - 15.9 g/dL Final      HCT Hematocrit   Date Value Ref Range Status   03/13/2025 25.6 (L) 34.0 - 46.6 % Final   03/12/2025 26.9 (L) 34.0 - 46.6 % Final   03/11/2025 26.6 (L) 34.0 - 46.6 % Final      Platlets No results found for: \"LABPLAT\"   MCV MCV   Date Value Ref Range Status   03/13/2025 100.8 (H) 79.0 - 97.0 fL Final   03/12/2025 101.5 (H) 79.0 - 97.0 fL Final   03/11/2025 100.0 (H) 79.0 - 97.0 fL Final          Sodium Sodium   Date Value Ref Range Status   03/13/2025 136 136 - 145 mmol/L Final   03/12/2025 136 136 - 145 mmol/L Final " "  03/11/2025 135 (L) 136 - 145 mmol/L Final      Potassium Potassium   Date Value Ref Range Status   03/13/2025 3.6 3.5 - 5.2 mmol/L Final   03/12/2025 3.5 3.5 - 5.2 mmol/L Final   03/11/2025 4.0 3.5 - 5.2 mmol/L Final      Chloride Chloride   Date Value Ref Range Status   03/13/2025 96 (L) 98 - 107 mmol/L Final   03/12/2025 96 (L) 98 - 107 mmol/L Final   03/11/2025 96 (L) 98 - 107 mmol/L Final      CO2 CO2   Date Value Ref Range Status   03/13/2025 33.1 (H) 22.0 - 29.0 mmol/L Final   03/12/2025 33.0 (H) 22.0 - 29.0 mmol/L Final   03/11/2025 32.1 (H) 22.0 - 29.0 mmol/L Final      BUN BUN   Date Value Ref Range Status   03/13/2025 53 (H) 8 - 23 mg/dL Final   03/12/2025 53 (H) 8 - 23 mg/dL Final   03/11/2025 48 (H) 8 - 23 mg/dL Final      Creatinine Creatinine   Date Value Ref Range Status   03/13/2025 1.20 (H) 0.57 - 1.00 mg/dL Final   03/12/2025 1.16 (H) 0.57 - 1.00 mg/dL Final   03/11/2025 1.14 (H) 0.57 - 1.00 mg/dL Final      Calcium Calcium   Date Value Ref Range Status   03/13/2025 9.6 8.6 - 10.5 mg/dL Final   03/12/2025 9.7 8.6 - 10.5 mg/dL Final   03/11/2025 9.5 8.6 - 10.5 mg/dL Final      PO4 No results found for: \"CAPO4\"   Albumin Albumin   Date Value Ref Range Status   03/13/2025 3.0 (L) 3.5 - 5.2 g/dL Final   03/12/2025 3.0 (L) 3.5 - 5.2 g/dL Final   03/11/2025 3.1 (L) 3.5 - 5.2 g/dL Final      Magnesium Magnesium   Date Value Ref Range Status   03/13/2025 1.9 1.6 - 2.4 mg/dL Final   03/12/2025 2.0 1.6 - 2.4 mg/dL Final   03/11/2025 2.0 1.6 - 2.4 mg/dL Final      Uric Acid No results found for: \"URICACID\"        Results Review:     I reviewed the patient's new clinical results.    apixaban, 5 mg, Oral, Q12H  DULoxetine, 40 mg, Oral, Daily  furosemide, 80 mg, Oral, BID Diuretics  HYDROcodone-acetaminophen, 1 tablet, Oral, Q6H  levothyroxine, 137 mcg, Oral, Q AM  metoprolol succinate XL, 12.5 mg, Oral, Q12H  midodrine, 15 mg, Oral, Q8H  mirtazapine, 15 mg, Oral, Nightly  polyethylene glycol, 17 g, Oral, " Daily  potassium chloride, 20 mEq, Oral, Daily  sennosides-docusate, 1 tablet, Oral, BID  sodium chloride, 10 mL, Intravenous, Q12H  sodium chloride, 10 mL, Intravenous, Q12H  tacrolimus, 0.5 mg, Oral, BID  vitamin B-12, 1,000 mcg, Oral, Daily      Medication Review: Reviewed    Assessment & Plan     TONYA  CKD3  Kidney Transplant  UTI  Fluid overload  Hypotension  Viral URI  RUE swelling - AVF with thrill and bruit  Anemia     PLAN: Renal graft function stable.  Transition to PO Lasix.  Palliative following.      Naun Falcon MD  [facility]  03/13/25  08:51 EDT

## 2025-03-13 NOTE — PROGRESS NOTES
Referring Provider: Juanis Lowe MD    Reason for follow-up: acute CHF, hypotension, a-fib      Patient Care Team:  Kvng Guillen MD as PCP - General (Family Medicine)  Jak Gavin MD as Cardiologist (Cardiology)      SUBJECTIVE  Patient is resting comfortably in bed.  She remains on 6 L of oxygen.  Blood pressure is low this morning.      ROS  Review of all systems negative except as indicated.    Since I have last seen, the patient has been without any chest discomfort, shortness of breath, palpitations, dizziness or syncope.  Denies having any headache, abdominal pain, nausea, vomiting, diarrhea, constipation, loss of weight or loss of appetite.  Denies having any excessive bruising, hematuria or blood in the stool.        Personal History:    Past Medical History:   Diagnosis Date    Allergic rhinitis 04/14/2015    Asthma 08/10/2017    Atrial flutter 05/11/2017    Chronic diarrhea 04/15/2019    Chronic hypoxemic respiratory failure 01/18/2024    Chronic pain 02/03/2015    COPD (chronic obstructive pulmonary disease)     COVID-19 virus detected 08/11/2022    Cytokine release syndrome, grade 1 08/16/2022    Edema of both lower extremities due to peripheral venous insufficiency 03/12/2021    ESRD on hemodialysis 05/22/2013    Essential (primary) hypertension 03/03/2022    Fracture of ulnar styloid 05/19/2022    Fracture of unspecified carpal bone, right wrist, subsequent encounter for fracture with routine healing 03/03/2022    Gastroesophageal reflux disease 11/12/2020    Gout 08/10/2017    Hearing loss 08/10/2017    History of appendectomy     History of DVT (deep vein thrombosis) 11/12/2020    History of kidney transplant 06/30/2017    History of lobectomy of lung 01/18/2024 03/08/2016:  RIGHT Lower Lobe Mass--> Right Video-assisted thoracoscopy with a moderate-to-large wedge resection of the RLL (by Dr. Haris Mcconnell @ [facility])--> Poorly differentiated carcinoma of the RLL.      History of  repair of hip joint 05/21/2013    History of suicide attempt 05/20/2024    Hyperlipidemia 08/11/2014    Hypothyroidism, unspecified 03/03/2022    Infection due to extended spectrum beta lactamase (ESBL) producing bacteria 03/11/2016    No A2K system hx. +ESBL E coli urine on 3/11/16.      Irritable bowel syndrome 04/15/2019    Long term (current) use of immunosuppressive biologic 04/28/2021    Long term current use of anticoagulant therapy 01/18/2024    Malignant neoplasm of lung 08/10/2017    Menopausal flushing 08/30/2021    Mitral valve regurgitation 07/06/2015    Mixed anxiety and depressive disorder 04/30/2015    Non-small cell lung cancer 08/10/2017    Nonobstructive atherosclerosis of coronary artery 06/02/2019 08/12/2022: CATH: Prashant: NSTEMI assoc with Covid-19: LM:-nl;  LAD: diffuse, Ca++; 30%; CIRC: Dominant. Normal; RCA: small; 50% diffuse, proximal and mid-segment.      NSTEMI (non-ST elevated myocardial infarction) 08/11/2022    Obsessive-compulsive disorder 04/15/2019    Osteoarthritis 05/20/2024    Paroxysmal atrial fibrillation 05/11/2017    Paroxysmal supraventricular tachycardia 05/11/2017    Peripheral neuropathy 08/11/2014    Personal history of peptic ulcer disease 11/12/2020    Postoperative anemia due to acute blood loss 05/22/2013    Right wrist fracture 03/01/2022    Seasonal allergies 08/10/2017    Secondary hyperparathyroidism of renal origin 09/14/2015    Tricuspid valve regurgitation 03/15/2017    Ulcer of lower extremity 08/30/2021    Unspecified acute appendicitis 03/03/2022    Valvular heart disease 05/20/2024    Wegener's granulomatosis with renal involvement 03/03/2022       Past Surgical History:   Procedure Laterality Date    APPENDECTOMY      ARTERIOVENOUS FISTULA/SHUNT SURGERY Right 12/3/2024    Procedure: FISTULOGRAM  and angioplasty RIGHT ARM;  Surgeon: Paulino Alva II, MD;  Location: [facility];  Service: Vascular;  Laterality: Right;    BREAST SURGERY  Left     cysts rmeoved    CARDIAC CATHETERIZATION Right 08/12/2022    Procedure: Left Heart Cath and coronary angiogram;  Surgeon: Jak Gavin MD;  Location: [facility] CATH INVASIVE LOCATION;  Service: Cardiology;  Laterality: Right;    CLOSED REDUCTION WRIST FRACTURE Right 03/01/2022    Procedure: WRIST CLOSED REDUCTION;  Surgeon: Gaudencio Townsend MD;  Location: [facility] MAIN OR;  Service: Orthopedics;  Laterality: Right;    CYSTOSCOPY      HIP ARTHROPLASTY      HYSTERECTOMY      LUNG LOBECTOMY Right     TRANSPLANTATION RENAL         Family History   Problem Relation Age of Onset    No Known Problems Mother     No Known Problems Father        Social History     Tobacco Use    Smoking status: Former     Passive exposure: Current    Smokeless tobacco: Never   Vaping Use    Vaping status: Former   Substance Use Topics    Alcohol use: Never    Drug use: Never        Home meds:  Prior to Admission medications    Medication Sig Start Date End Date Taking? Authorizing Provider   amoxicillin-clavulanate (AUGMENTIN) 875-125 MG per tablet Take 1 tablet by mouth 2 (Two) Times a Day. 3/1/25  Yes Lorenzo Morales MD   apixaban (ELIQUIS) 5 MG tablet tablet Take 1 tablet by mouth Every 12 (Twelve) Hours. 8/16/22  Yes Clyde White DO   DULoxetine HCl 40 MG capsule delayed-release particles Take 1 capsule by mouth Daily. Indications: Major Depressive Disorder 7/11/24  Yes Court Vences MD   ferrous sulfate 325 (65 FE) MG tablet Take 1 tablet by mouth 3 times a day.   Yes Court Vences MD   furosemide (LASIX) 40 MG tablet Take 1 tablet by mouth 2 (Two) Times a Day.   Yes Court Vences MD   hydrOXYzine (ATARAX) 25 MG tablet Take 1 tablet by mouth Every Night.   Yes Court Vences MD   LORazepam (ATIVAN) 0.5 MG tablet Take 1 tablet by mouth Every 6 (Six) Hours.   Yes Court Vences MD   metoprolol tartrate (LOPRESSOR) 25 MG tablet Take 0.5 tablets by mouth 2 (Two) Times a Day.   Yes Ru  MD Court   midodrine (PROAMATINE) 5 MG tablet Take 3 tablets by mouth 3 (Three) Times a Day Before Meals.   Yes Court Vences MD   mirtazapine (REMERON) 15 MG tablet Take 1 tablet by mouth Every Night.   Yes Court Vences MD   polyethylene glycol (MiraLax) 17 GM/SCOOP powder Take 17 g by mouth Daily.   Yes Court Vences MD   predniSONE (DELTASONE) 5 MG tablet Take 1 tablet by mouth Daily. Indications: ACUTE EXACERBATION OF COPD (INACTIVE) 7/11/24  Yes Court Vences MD   sacubitril-valsartan (Entresto) 24-26 MG tablet Take 1 tablet by mouth 2 (Two) Times a Day.   Yes Court Vences MD   sennosides-docusate (PERICOLACE) 8.6-50 MG per tablet Take 1 tablet by mouth 2 (Two) Times a Day.   Yes Court Vences MD   simvastatin (ZOCOR) 20 MG tablet Take 1 tablet by mouth Every Night.   Yes Court Vences MD   tacrolimus (PROGRAF) 0.5 MG capsule Take 1 capsule by mouth 2 (Two) Times a Day. 6/13/24  Yes Janene Archer APRN   vitamin B-12 (CYANOCOBALAMIN) 1000 MCG tablet Take 1 tablet by mouth Daily.   Yes Court Vences MD   vitamin D (ERGOCALCIFEROL) 1.25 MG (73254 UT) capsule capsule Take 1 capsule by mouth 1 (One) Time Per Week.   Yes Court Vences MD   acetaminophen (Tylenol) 325 MG tablet Take 2 tablets by mouth Every 4 (Four) Hours As Needed for Fever or Mild Pain. Indications: Pain 3/13/20   Court Vences MD   albuterol sulfate  (90 Base) MCG/ACT inhaler Inhale 2 puffs Every 6 (Six) Hours As Needed for Wheezing.    Court Vences MD   HYDROcodone-acetaminophen (NORCO)  MG per tablet Take 1 tablet by mouth Every 6 (Six) Hours.    Court Vences MD       Allergies:  Zolpidem    Scheduled Meds:apixaban, 5 mg, Oral, Q12H  DULoxetine, 40 mg, Oral, Daily  furosemide, 80 mg, Intravenous, Q12H  HYDROcodone-acetaminophen, 1 tablet, Oral, Q6H  levothyroxine, 137 mcg, Oral, Q AM  metoprolol succinate XL, 12.5 mg, Oral,  "Q12H  midodrine, 15 mg, Oral, Q8H  mirtazapine, 15 mg, Oral, Nightly  polyethylene glycol, 17 g, Oral, Daily  potassium chloride, 20 mEq, Oral, Daily  sennosides-docusate, 1 tablet, Oral, BID  sodium chloride, 10 mL, Intravenous, Q12H  sodium chloride, 10 mL, Intravenous, Q12H  tacrolimus, 0.5 mg, Oral, BID  vitamin B-12, 1,000 mcg, Oral, Daily      Continuous Infusions:   PRN Meds:.  acetaminophen **OR** acetaminophen    albuterol    aluminum-magnesium hydroxide-simethicone    senna-docusate sodium **AND** polyethylene glycol **AND** bisacodyl **AND** bisacodyl    hydrOXYzine    ipratropium-albuterol    [Held by provider] LORazepam    nitroglycerin    ondansetron ODT **OR** ondansetron    prochlorperazine    [COMPLETED] Insert Peripheral IV **AND** sodium chloride    sodium chloride    sodium chloride    sodium chloride    sodium chloride    sodium chloride      OBJECTIVE    Vital Signs  Vitals:    03/12/25 1800 03/12/25 1935 03/13/25 0100 03/13/25 0421   BP:  134/98 93/51 (!) 84/45   BP Location:  Left arm Left arm Left arm   Patient Position:  Lying Lying Lying   Pulse:  95 86 93   Resp: 18 20 14 14   Temp: 98 °F (36.7 °C) 97.4 °F (36.3 °C) 97.5 °F (36.4 °C) 97.6 °F (36.4 °C)   TempSrc:  Axillary Axillary Temporal   SpO2: 90% 93% 95% 96%   Weight:       Height:           Flowsheet Rows      Flowsheet Row First Filed Value   Admission Height 167.7 cm (66.02\") Documented at 03/03/2025 1641   Admission Weight 81.6 kg (179 lb 14.3 oz) Documented at 03/03/2025 1641              Intake/Output Summary (Last 24 hours) at 3/13/2025 0746  Last data filed at 3/13/2025 0431  Gross per 24 hour   Intake 960 ml   Output 450 ml   Net 510 ml          Telemetry: Atrial fibrillation with controlled heart rate    Physical Exam:  The patient is alert and in no distress. She is frail and appears chronically ill.   Vital signs as noted above.  Head and neck revealed no carotid bruits or jugular venous distention.   Lungs with scattered " rhonchi and diminished bases.  No wheezing.  On oxygen at 4 liters per high flow nasal cannula.   Heart normal first and second heart sounds.  No murmur. No precordial rub is present.  No gallop is present.  Abdomen soft and nontender.    Extremities with good peripheral pulses with 1+ edema in BLE.  Skin warm and dry.  Musculoskeletal system is grossly normal.  CNS:  she is oriented to person only      Results Review:  I have personally reviewed the results from the time of this admission to 3/13/2025 07:46 EDT and agree with these findings:  [x]  Laboratory  [x]  Microbiology  [x]  Radiology  [x]  EKG/Telemetry   [x]  Cardiology/Vascular   []  Pathology  [x]  Old records  []  Other:    Most notable findings include:    Lab Results (last 24 hours)       Procedure Component Value Units Date/Time    Magnesium [[id number]]  (Normal) Collected: 03/13/25 0202    Specimen: Blood from Arm, Left Updated: 03/13/25 0259     Magnesium 1.9 mg/dL     Comprehensive Metabolic Panel [[id number]]  (Abnormal) Collected: 03/13/25 0202    Specimen: Blood from Arm, Left Updated: 03/13/25 0259     Glucose 117 mg/dL      BUN 53 mg/dL      Creatinine 1.20 mg/dL      Sodium 136 mmol/L      Potassium 3.6 mmol/L      Chloride 96 mmol/L      CO2 33.1 mmol/L      Calcium 9.6 mg/dL      Total Protein 6.2 g/dL      Albumin 3.0 g/dL      ALT (SGPT) <5 U/L      AST (SGOT) 13 U/L      Alkaline Phosphatase 86 U/L      Total Bilirubin 0.4 mg/dL      Globulin 3.2 gm/dL      A/G Ratio 0.9 g/dL      BUN/Creatinine Ratio 44.2     Anion Gap 6.9 mmol/L      eGFR 46.4 mL/min/1.73     Narrative:      GFR Categories in Chronic Kidney Disease (CKD)      GFR Category          GFR (mL/min/1.73)    Interpretation  G1                     90 or greater         Normal or high (1)  G2                      60-89                Mild decrease (1)  G3a                   45-59                Mild to moderate decrease  G3b                   30-44                Moderate to  severe decrease  G4                    15-29                Severe decrease  G5                    14 or less           Kidney failure          (1)In the absence of evidence of kidney disease, neither GFR category G1 or G2 fulfill the criteria for CKD.    eGFR calculation 2021 CKD-EPI creatinine equation, which does not include race as a factor    Phosphorus [[id number]]  (Normal) Collected: 03/13/25 0202    Specimen: Blood from Arm, Left Updated: 03/13/25 0251     Phosphorus 3.8 mg/dL     CBC & Differential [[id number]]  (Abnormal) Collected: 03/13/25 0215    Specimen: Blood Updated: 03/13/25 0225    Narrative:      The following orders were created for panel order CBC & Differential.  Procedure                               Abnormality         Status                     ---------                               -----------         ------                     CBC Auto Differential[[id number]]        Abnormal            Final result                 Please view results for these tests on the individual orders.    CBC Auto Differential [[id number]]  (Abnormal) Collected: 03/13/25 0215    Specimen: Blood Updated: 03/13/25 0225     WBC 7.05 10*3/mm3      RBC 2.54 10*6/mm3      Hemoglobin 7.6 g/dL      Hematocrit 25.6 %      .8 fL      MCH 29.9 pg      MCHC 29.7 g/dL      RDW 17.8 %      RDW-SD 63.9 fl      MPV 9.5 fL      Platelets 155 10*3/mm3      Neutrophil % 58.1 %      Lymphocyte % 21.3 %      Monocyte % 12.8 %      Eosinophil % 6.5 %      Basophil % 1.0 %      Immature Grans % 0.3 %      Neutrophils, Absolute 4.10 10*3/mm3      Lymphocytes, Absolute 1.50 10*3/mm3      Monocytes, Absolute 0.90 10*3/mm3      Eosinophils, Absolute 0.46 10*3/mm3      Basophils, Absolute 0.07 10*3/mm3      Immature Grans, Absolute 0.02 10*3/mm3      nRBC 0.0 /100 WBC             Imaging Results (Last 24 Hours)       ** No results found for the last 24 hours. **            LAB RESULTS (LAST 7 DAYS)    CBC  Results from last 7 days   Lab  Units 03/13/25 0215 03/12/25 0451 03/11/25 0310 03/09/25 2334 03/09/25  1003 03/08/25  0544 03/07/25  0541   WBC 10*3/mm3 7.05 6.14 6.90 7.04 7.45 7.03 8.45   RBC 10*6/mm3 2.54* 2.65* 2.66* 2.59* 2.73* 2.80* 2.81*   HEMOGLOBIN g/dL 7.6* 7.7* 7.7* 7.7* 8.0* 8.3* 8.4*   HEMATOCRIT % 25.6* 26.9* 26.6* 26.3* 27.6* 28.4* 28.7*   MCV fL 100.8* 101.5* 100.0* 101.5* 101.1* 101.4* 102.1*   PLATELETS 10*3/mm3 155 150 145 149 148 132* 161       BMP  Results from last 7 days   Lab Units 03/13/25 0202 03/12/25 0451 03/11/25 0310 03/09/25 2334 03/09/25  1003 03/08/25  0544 03/07/25  0541   SODIUM mmol/L 136 136 135* 134* 135* 136 136   POTASSIUM mmol/L 3.6 3.5 4.0 4.5 4.4 4.7 4.7   CHLORIDE mmol/L 96* 96* 96* 96* 97* 101 97*   CO2 mmol/L 33.1* 33.0* 32.1* 28.8 29.0 26.1 28.0   BUN mg/dL 53* 53* 48* 47* 48* 48* 50*   CREATININE mg/dL 1.20* 1.16* 1.14* 1.14* 1.14* 1.19* 1.41*   GLUCOSE mg/dL 117* 93 106* 103* 131* 76 83   MAGNESIUM mg/dL 1.9 2.0 2.0 2.0 1.9 1.8 2.0   PHOSPHORUS mg/dL 3.8 4.1 3.9 3.8 3.8 3.2 3.3       CMP   Results from last 7 days   Lab Units 03/13/25 0202 03/12/25 0451 03/11/25 0310 03/09/25 2334 03/09/25  1003 03/08/25  0544 03/07/25  0541   SODIUM mmol/L 136 136 135* 134* 135* 136 136   POTASSIUM mmol/L 3.6 3.5 4.0 4.5 4.4 4.7 4.7   CHLORIDE mmol/L 96* 96* 96* 96* 97* 101 97*   CO2 mmol/L 33.1* 33.0* 32.1* 28.8 29.0 26.1 28.0   BUN mg/dL 53* 53* 48* 47* 48* 48* 50*   CREATININE mg/dL 1.20* 1.16* 1.14* 1.14* 1.14* 1.19* 1.41*   GLUCOSE mg/dL 117* 93 106* 103* 131* 76 83   ALBUMIN g/dL 3.0* 3.0* 3.1* 3.0* 3.0* 2.9* 3.1*   BILIRUBIN mg/dL 0.4 0.4 0.4 0.4 0.5 0.4 0.5   ALK PHOS U/L 86 81 80 76 74 70 72   AST (SGOT) U/L 13 11 13 13 13 22 13   ALT (SGPT) U/L <5 <5 <5 5 <5 <5 <5       BNP        TROPONIN          CoAg        Creatinine Clearance  Estimated Creatinine Clearance: 40.1 mL/min (A) (by C-G formula based on SCr of 1.2 mg/dL (H)).    ABG          Radiology  No radiology results for the last  day      EKG  I personally viewed and interpreted the patient's EKG/Telemetry data:  ECG 12 Lead Altered Mental Status   Final Result   HEART RATE=96  bpm   RR Lvzwuttf=313  ms   OK Interval=  ms   P Horizontal Axis=  deg   P Front Axis=  deg   QRSD Interval=80  ms   QT Vgjejmnq=537  ms   JHlF=279  ms   QRS Axis=-39  deg   T Wave Axis=90  deg   - ABNORMAL ECG -   Atrial fibrillation   Ventricular premature complex   Left axis deviation   Anteroseptal  infarct, age indeterminate   When compared with ECG of 01-Mar-2025 09:40:43,   Significant axis, voltage or hypertrophy change   Electronically Signed By: Paulino Miller (TWILA) 2025-03-04 07:28:16   Date and Time of Study:2025-03-03 16:45:51      Telemetry Scan   Final Result      Telemetry Scan   Final Result      Telemetry Scan   Final Result      Telemetry Scan   Final Result      Telemetry Scan   Final Result      Telemetry Scan   Final Result      Telemetry Scan   Final Result      Telemetry Scan   Final Result      Telemetry Scan   Final Result      Telemetry Scan   Final Result      Telemetry Scan   Final Result      Telemetry Scan   Final Result      Telemetry Scan   Final Result      Telemetry Scan   Final Result      Telemetry Scan   Final Result      Telemetry Scan   Final Result            Echocardiogram:    Results for orders placed during the hospital encounter of 11/29/24    Adult Transthoracic Echo Complete W/ Cont if Necessary Per Protocol    Interpretation Summary    Left ventricular systolic function is normal. Calculated left ventricular EF = 70% Left ventricular ejection fraction appears to be 66 - 70%.    Left ventricular wall thickness is consistent with moderate to severe concentric hypertrophy.    Left ventricular diastolic function is consistent with (grade III w/high LAP) reversible restrictive pattern.    Moderately reduced right ventricular systolic function noted.    The right ventricular cavity is severely dilated.    The left atrial  cavity is severely dilated.    The right atrial cavity is severely  dilated.    There is mild calcification of the aortic valve mainly affecting the left coronary and right coronary cusp(s).    Severe tricuspid valve regurgitation is present.    Estimated right ventricular systolic pressure from tricuspid regurgitation is markedly elevated (>55 mmHg).    Severe pulmonary hypertension is present.    There is a moderate sized left pleural effusion.        Stress Test:         Cardiac Catheterization:  Results for orders placed during the hospital encounter of 08/11/22    Cardiac Catheterization/Vascular Study    Conclusion  IMPRESSIONS  Non obstructive CAD         Other:         ASSESSMENT & PLAN:    Principal Problem:    UTI (urinary tract infection)  Active Problems:    COPD (chronic obstructive pulmonary disease)    Hypothyroidism, unspecified    History of kidney transplant    Long term (current) use of immunosuppressive biologic    Long term current use of anticoagulant therapy    History of DVT (deep vein thrombosis)    Pulmonary hypertension    Decubitus ulcer of foot, stage 3    Edema of both lower extremities due to peripheral venous insufficiency    Mixed anxiety and depressive disorder    Essential (primary) hypertension    Paroxysmal atrial fibrillation    Acute exacerbation of CHF (congestive heart failure)    End stage renal disease    Edema of right upper arm    TONYA (acute kidney injury)    Pressure injury of right heel, stage 3      Atrial fibrillation with rapid ventricular rate  Target heart rate less than 100  Heart rate is better now, in the 90s  Continue Toprol-XL: Can uptitrate if blood pressure allows  [id number] score is 5  Continue Eliquis      Acute on chronic HFpEF / Pulmonary hypertension  Echocardiogram shows preserved LV function with severe tricuspid valve regurgitation and severe pulmonary hypertension.  Presented with proBNP of 17,000 (compared to 4800 during previous  admission)  Currently on IV diuretics.  Dosing per nephrology  Metolazone as needed  Unable to add GDMT due to renal dysfunction and hypotension requiring midodrine  Strict I&Os and daily weight     Hypotension  Currently requiring midodrine.  Not on ACE inhibitor/Arni/ARB due to hypotension and renal dysfunction     History of kidney transplant / TONYA on CKD  AV fistula in place, but no on hemodialysis   On tacrolimus  Creatinine stable 1.2, GFR is 46.4  Closely monitor renal function while on diuretics  Nephrology is following     Right IJ DVT  Diagnosed in December 2024  Repeat venous duplex is negative for DVT  VQ scan with low probability of PE  Continue Eliquis     Anemia in CKD   H&H 7.6/25.6  Monitor H&H while on anticoagulation/Eliquis  Transfuse as needed     COPD  History of lung cancer  6 L of oxygen: Wean as tolerated  Continue bronchodilators     Anxiety/depression  Currently on duloxetine and mirtazapine     CODE STATUS is DNI DNR.  Palliative care team is following.

## 2025-03-13 NOTE — PLAN OF CARE
Goal Outcome Evaluation:         Patient with no new complaints. Plan to discharge to [facility] today.

## 2025-03-14 NOTE — CASE MANAGEMENT/SOCIAL WORK
Case Management Discharge Note      Final Note: [facility]                 Transportation Services  Ambulance: [facility]    Final Discharge Disposition Code: 04 - intermediate care facility

## (undated) DEVICE — GLV SURG SENSICARE PI ORTHO SZ8 LF STRL

## (undated) DEVICE — GOWN,REINFORCE,POLY,SIRUS,BREATH SLV,XLG: Brand: MEDLINE

## (undated) DEVICE — PK EXTREM 50

## (undated) DEVICE — ST ACC MICROPUNCTURE STFF/CANN PLAT/TP 4F 21G 40CM

## (undated) DEVICE — KT SURG TURNOVER 050

## (undated) DEVICE — IMPERVIOUS STOCKINETTE: Brand: DEROYAL

## (undated) DEVICE — PK TRY HEART CATH 50

## (undated) DEVICE — Device

## (undated) DEVICE — CATH DIAG IMPULSE FL4 6F 100CM

## (undated) DEVICE — DEV INFL COMPAK W/ACCESSPLUS IN4530

## (undated) DEVICE — SPNG GZ AVANT 6PLY 4X4IN STRL PK/2

## (undated) DEVICE — MICRO TIP WIPE: Brand: DEVON

## (undated) DEVICE — GLV SURG BIOGEL LTX PF 7 1/2

## (undated) DEVICE — CATH TEMPO 5F BER II 65CM: Brand: TEMPO

## (undated) DEVICE — IR MAJOR VASCULAR PACK: Brand: MEDLINE INDUSTRIES, INC.

## (undated) DEVICE — SUT PROLN 6/0 BV1 D/A 30IN 8709H

## (undated) DEVICE — EXTENSION SET, MALE LUER LOCK ADAPTER WITH RETRACTABLE COLLAR

## (undated) DEVICE — PTA BALLOON DILATATION CATHETER: Brand: MUSTANG™

## (undated) DEVICE — PINNACLE INTRODUCER SHEATH: Brand: PINNACLE

## (undated) DEVICE — GLV SURG SENSICARE PI LF PF 7.5 GRN STRL

## (undated) DEVICE — SOL IRRIG NACL 1000ML

## (undated) DEVICE — ANTIBACTERIAL UNDYED BRAIDED (POLYGLACTIN 910), SYNTHETIC ABSORBABLE SUTURE: Brand: COATED VICRYL

## (undated) DEVICE — SOLUTION,WATER,IRRIGATION,1000ML,STERILE: Brand: MEDLINE

## (undated) DEVICE — GLV SURG SENSICARE PI ORTHO SZ7.5 LF STRL

## (undated) DEVICE — CATH DIAG IMPULSE FR4 6F 100CM

## (undated) DEVICE — SLV SCD CALF HEMOFORCE DVT THERP REPROC MD

## (undated) DEVICE — RADIFOCUS GLIDEWIRE: Brand: GLIDEWIRE

## (undated) DEVICE — GLV SURG SENSICARE PI LF PF 8 GRN STRL

## (undated) DEVICE — ELECTRD DEFIB M/FUNC PROPADZ RADIOL 2PK

## (undated) DEVICE — BNDG ESMARK 4IN 12FT LF STRL BLU

## (undated) DEVICE — UNDRGLV SURG BIOGEL PIMICROINDICATOR SYNTH SZ7.5PF STRL PR/2

## (undated) DEVICE — 3M™ STERI-DRAPE™ U-DRAPE 1015: Brand: STERI-DRAPE™

## (undated) DEVICE — PINNACLE R/O II INTRODUCER SHEATH WITH RADIOPAQUE MARKER: Brand: PINNACLE

## (undated) DEVICE — SUT SILK 3/0 SH CR8 18IN C013D

## (undated) DEVICE — SUT PROLN 3/0 V7 D/A 36IN 8976H

## (undated) DEVICE — GOWN,REINFRCE,POLY,SIRUS,BREATH SLV,XXLG: Brand: MEDLINE

## (undated) DEVICE — GW PTFE EMERALD HEPCOAT FC J TIP STD .035 3MM 150CM

## (undated) DEVICE — CVR PROB 96IN LF STRL

## (undated) DEVICE — PAD CAST SYN PROTOUCH RL 3IN NS

## (undated) DEVICE — MYNXGRIP 6F/7F: Brand: MYNXGRIP

## (undated) DEVICE — PENCL HND ROCKRSWTCH HOLSTR EZ CLEAN TP CRD 10FT

## (undated) DEVICE — CVR HNDL LT SURG ACCSSRY BLU STRL

## (undated) DEVICE — ATLAS®  PTA BALLOON DILATATION CATHETER 12 MM X 40 MM, 75 CM CATHETER: Brand: ATLAS®

## (undated) DEVICE — RADIFOCUS TORQUE DEVICE MULTI-TORQUE VISE: Brand: RADIFOCUS TORQUE DEVICE

## (undated) DEVICE — C-ARM: Brand: UNBRANDED

## (undated) DEVICE — RADIFOCUS GLIDEWIRE ADVANTAGE GUIDEWIRE: Brand: GLIDEWIRE ADVANTAGE